# Patient Record
Sex: FEMALE | Race: WHITE | NOT HISPANIC OR LATINO | Employment: OTHER | URBAN - METROPOLITAN AREA
[De-identification: names, ages, dates, MRNs, and addresses within clinical notes are randomized per-mention and may not be internally consistent; named-entity substitution may affect disease eponyms.]

---

## 2017-01-10 ENCOUNTER — GENERIC CONVERSION - ENCOUNTER (OUTPATIENT)
Dept: OTHER | Facility: OTHER | Age: 72
End: 2017-01-10

## 2017-01-17 ENCOUNTER — GENERIC CONVERSION - ENCOUNTER (OUTPATIENT)
Dept: OTHER | Facility: OTHER | Age: 72
End: 2017-01-17

## 2017-02-14 ENCOUNTER — GENERIC CONVERSION - ENCOUNTER (OUTPATIENT)
Dept: OTHER | Facility: OTHER | Age: 72
End: 2017-02-14

## 2017-02-28 ENCOUNTER — GENERIC CONVERSION - ENCOUNTER (OUTPATIENT)
Dept: OTHER | Facility: OTHER | Age: 72
End: 2017-02-28

## 2017-03-02 ENCOUNTER — APPOINTMENT (EMERGENCY)
Dept: RADIOLOGY | Facility: HOSPITAL | Age: 72
End: 2017-03-02
Payer: COMMERCIAL

## 2017-03-02 ENCOUNTER — ALLSCRIPTS OFFICE VISIT (OUTPATIENT)
Dept: OTHER | Facility: OTHER | Age: 72
End: 2017-03-02

## 2017-03-02 ENCOUNTER — HOSPITAL ENCOUNTER (EMERGENCY)
Facility: HOSPITAL | Age: 72
Discharge: HOME | End: 2017-03-02
Attending: EMERGENCY MEDICINE | Admitting: EMERGENCY MEDICINE
Payer: COMMERCIAL

## 2017-03-02 VITALS
HEART RATE: 68 BPM | DIASTOLIC BLOOD PRESSURE: 65 MMHG | OXYGEN SATURATION: 98 % | SYSTOLIC BLOOD PRESSURE: 140 MMHG | WEIGHT: 206.35 LBS | RESPIRATION RATE: 17 BRPM | TEMPERATURE: 97.8 F

## 2017-03-02 DIAGNOSIS — F41.1 ANXIETY STATE: ICD-10-CM

## 2017-03-02 DIAGNOSIS — R07.9 CHEST PAIN: Primary | ICD-10-CM

## 2017-03-02 LAB
ANION GAP SERPL CALCULATED.3IONS-SCNC: 9 MMOL/L (ref 4–13)
APTT PPP: 26 SECONDS (ref 24–36)
ATRIAL RATE: 75 BPM
BASOPHILS # BLD AUTO: 0.01 THOUSANDS/ΜL (ref 0–0.1)
BASOPHILS NFR BLD AUTO: 0 % (ref 0–1)
BUN SERPL-MCNC: 13 MG/DL (ref 5–25)
CALCIUM SERPL-MCNC: 9.8 MG/DL (ref 8.3–10.1)
CHLORIDE SERPL-SCNC: 102 MMOL/L (ref 100–108)
CO2 SERPL-SCNC: 28 MMOL/L (ref 21–32)
CREAT SERPL-MCNC: 1.18 MG/DL (ref 0.6–1.3)
EOSINOPHIL # BLD AUTO: 0.05 THOUSAND/ΜL (ref 0–0.61)
EOSINOPHIL NFR BLD AUTO: 1 % (ref 0–6)
ERYTHROCYTE [DISTWIDTH] IN BLOOD BY AUTOMATED COUNT: 15.5 % (ref 11.6–15.1)
GFR SERPL CREATININE-BSD FRML MDRD: 45.3 ML/MIN/1.73SQ M
GLUCOSE SERPL-MCNC: 126 MG/DL (ref 65–140)
HCT VFR BLD AUTO: 36.8 % (ref 34.8–46.1)
HGB BLD-MCNC: 11.7 G/DL (ref 11.5–15.4)
INR PPP: 1.09 (ref 0.86–1.16)
LYMPHOCYTES # BLD AUTO: 2.78 THOUSANDS/ΜL (ref 0.6–4.47)
LYMPHOCYTES NFR BLD AUTO: 30 % (ref 14–44)
MCH RBC QN AUTO: 26.8 PG (ref 26.8–34.3)
MCHC RBC AUTO-ENTMCNC: 31.8 G/DL (ref 31.4–37.4)
MCV RBC AUTO: 84 FL (ref 82–98)
MONOCYTES # BLD AUTO: 0.65 THOUSAND/ΜL (ref 0.17–1.22)
MONOCYTES NFR BLD AUTO: 7 % (ref 4–12)
NEUTROPHILS # BLD AUTO: 5.87 THOUSANDS/ΜL (ref 1.85–7.62)
NEUTS SEG NFR BLD AUTO: 62 % (ref 43–75)
P AXIS: 73 DEGREES
PLATELET # BLD AUTO: 269 THOUSANDS/UL (ref 149–390)
PMV BLD AUTO: 11 FL (ref 8.9–12.7)
POTASSIUM SERPL-SCNC: 4.5 MMOL/L (ref 3.5–5.3)
PR INTERVAL: 146 MS
PROTHROMBIN TIME: 13.9 SECONDS (ref 12–14.3)
QRS AXIS: -29 DEGREES
QRSD INTERVAL: 86 MS
QT INTERVAL: 378 MS
QTC INTERVAL: 422 MS
RBC # BLD AUTO: 4.36 MILLION/UL (ref 3.81–5.12)
SODIUM SERPL-SCNC: 139 MMOL/L (ref 136–145)
T WAVE AXIS: 3 DEGREES
TROPONIN I SERPL-MCNC: <0.02 NG/ML
VENTRICULAR RATE: 75 BPM
WBC # BLD AUTO: 9.36 THOUSAND/UL (ref 4.31–10.16)

## 2017-03-02 PROCEDURE — 85025 COMPLETE CBC W/AUTO DIFF WBC: CPT | Performed by: EMERGENCY MEDICINE

## 2017-03-02 PROCEDURE — 71020 HB CHEST X-RAY 2VW FRONTAL&LATL: CPT

## 2017-03-02 PROCEDURE — 96374 THER/PROPH/DIAG INJ IV PUSH: CPT

## 2017-03-02 PROCEDURE — 85610 PROTHROMBIN TIME: CPT | Performed by: EMERGENCY MEDICINE

## 2017-03-02 PROCEDURE — 84484 ASSAY OF TROPONIN QUANT: CPT | Performed by: EMERGENCY MEDICINE

## 2017-03-02 PROCEDURE — 93005 ELECTROCARDIOGRAM TRACING: CPT | Performed by: EMERGENCY MEDICINE

## 2017-03-02 PROCEDURE — 99285 EMERGENCY DEPT VISIT HI MDM: CPT

## 2017-03-02 PROCEDURE — 36415 COLL VENOUS BLD VENIPUNCTURE: CPT | Performed by: EMERGENCY MEDICINE

## 2017-03-02 PROCEDURE — 80048 BASIC METABOLIC PNL TOTAL CA: CPT | Performed by: EMERGENCY MEDICINE

## 2017-03-02 PROCEDURE — 85730 THROMBOPLASTIN TIME PARTIAL: CPT | Performed by: EMERGENCY MEDICINE

## 2017-03-02 RX ORDER — INSULIN GLARGINE 100 [IU]/ML
45 INJECTION, SOLUTION SUBCUTANEOUS 2 TIMES DAILY
Status: ON HOLD | COMMUNITY
End: 2017-06-22

## 2017-03-02 RX ORDER — DULOXETIN HYDROCHLORIDE 30 MG/1
30 CAPSULE, DELAYED RELEASE ORAL
Status: ON HOLD | COMMUNITY
End: 2017-04-19 | Stop reason: CLARIF

## 2017-03-02 RX ORDER — LORAZEPAM 2 MG/ML
1 INJECTION INTRAMUSCULAR ONCE
Status: COMPLETED | OUTPATIENT
Start: 2017-03-02 | End: 2017-03-02

## 2017-03-02 RX ORDER — ALPRAZOLAM 0.5 MG/1
0.5 TABLET ORAL 3 TIMES DAILY PRN
COMMUNITY
End: 2018-04-03 | Stop reason: SDUPTHER

## 2017-03-02 RX ORDER — ATENOLOL 25 MG/1
12.5 TABLET ORAL
COMMUNITY
End: 2017-06-07

## 2017-03-02 RX ORDER — ATORVASTATIN CALCIUM 40 MG/1
40 TABLET, FILM COATED ORAL
COMMUNITY
End: 2018-12-13 | Stop reason: ALTCHOICE

## 2017-03-02 RX ORDER — AMLODIPINE BESYLATE 2.5 MG/1
2.5 TABLET ORAL 2 TIMES DAILY
COMMUNITY
End: 2018-09-13 | Stop reason: ALTCHOICE

## 2017-03-02 RX ORDER — ENALAPRIL MALEATE 5 MG/1
5 TABLET ORAL 2 TIMES DAILY
COMMUNITY
End: 2017-06-07

## 2017-03-02 RX ORDER — DULOXETIN HYDROCHLORIDE 60 MG/1
60 CAPSULE, DELAYED RELEASE ORAL EVERY MORNING
COMMUNITY
End: 2019-09-12 | Stop reason: SDUPTHER

## 2017-03-02 RX ORDER — ENALAPRIL MALEATE 10 MG/1
10 TABLET ORAL
Status: ON HOLD | COMMUNITY
End: 2017-04-19 | Stop reason: CLARIF

## 2017-03-02 RX ADMIN — LORAZEPAM 1 MG: 2 INJECTION INTRAMUSCULAR; INTRAVENOUS at 12:44

## 2017-03-03 ENCOUNTER — GENERIC CONVERSION - ENCOUNTER (OUTPATIENT)
Dept: OTHER | Facility: OTHER | Age: 72
End: 2017-03-03

## 2017-03-09 ENCOUNTER — LAB CONVERSION - ENCOUNTER (OUTPATIENT)
Dept: OTHER | Facility: OTHER | Age: 72
End: 2017-03-09

## 2017-03-09 LAB
CREATININE, RANDOM URINE (HISTORICAL): 178 MG/DL (ref 20–320)
MAGNESIUM, UR (HISTORICAL): 5.1 MG/DL
MICROALBUMIN/CREATININE RATIO (HISTORICAL): 29 MCG/MG CREAT

## 2017-03-13 ENCOUNTER — ALLSCRIPTS OFFICE VISIT (OUTPATIENT)
Dept: OTHER | Facility: OTHER | Age: 72
End: 2017-03-13

## 2017-03-15 ENCOUNTER — GENERIC CONVERSION - ENCOUNTER (OUTPATIENT)
Dept: OTHER | Facility: OTHER | Age: 72
End: 2017-03-15

## 2017-04-04 ENCOUNTER — GENERIC CONVERSION - ENCOUNTER (OUTPATIENT)
Dept: OTHER | Facility: OTHER | Age: 72
End: 2017-04-04

## 2017-04-12 ENCOUNTER — GENERIC CONVERSION - ENCOUNTER (OUTPATIENT)
Dept: OTHER | Facility: OTHER | Age: 72
End: 2017-04-12

## 2017-04-18 ENCOUNTER — APPOINTMENT (EMERGENCY)
Dept: CT IMAGING | Facility: HOSPITAL | Age: 72
End: 2017-04-18
Payer: COMMERCIAL

## 2017-04-18 ENCOUNTER — GENERIC CONVERSION - ENCOUNTER (OUTPATIENT)
Dept: OTHER | Facility: OTHER | Age: 72
End: 2017-04-18

## 2017-04-18 ENCOUNTER — HOSPITAL ENCOUNTER (EMERGENCY)
Facility: HOSPITAL | Age: 72
Discharge: HOME | End: 2017-04-18
Attending: EMERGENCY MEDICINE | Admitting: EMERGENCY MEDICINE
Payer: COMMERCIAL

## 2017-04-18 ENCOUNTER — APPOINTMENT (EMERGENCY)
Dept: RADIOLOGY | Facility: HOSPITAL | Age: 72
End: 2017-04-18
Payer: COMMERCIAL

## 2017-04-18 VITALS
WEIGHT: 211.64 LBS | SYSTOLIC BLOOD PRESSURE: 150 MMHG | TEMPERATURE: 99.2 F | RESPIRATION RATE: 18 BRPM | OXYGEN SATURATION: 100 % | DIASTOLIC BLOOD PRESSURE: 67 MMHG | HEART RATE: 70 BPM

## 2017-04-18 DIAGNOSIS — R10.9 ABDOMINAL PAIN: Primary | ICD-10-CM

## 2017-04-18 LAB
ALBUMIN SERPL BCP-MCNC: 3.4 G/DL (ref 3.5–5)
ALP SERPL-CCNC: 119 U/L (ref 46–116)
ALT SERPL W P-5'-P-CCNC: 33 U/L (ref 12–78)
ANION GAP SERPL CALCULATED.3IONS-SCNC: 10 MMOL/L (ref 4–13)
AST SERPL W P-5'-P-CCNC: 26 U/L (ref 5–45)
ATRIAL RATE: 74 BPM
BASOPHILS # BLD AUTO: 0.02 THOUSANDS/ΜL (ref 0–0.1)
BASOPHILS NFR BLD AUTO: 0 % (ref 0–1)
BILIRUB SERPL-MCNC: 0.6 MG/DL (ref 0.2–1)
BUN SERPL-MCNC: 11 MG/DL (ref 5–25)
CALCIUM SERPL-MCNC: 9.3 MG/DL (ref 8.3–10.1)
CHLORIDE SERPL-SCNC: 97 MMOL/L (ref 100–108)
CO2 SERPL-SCNC: 28 MMOL/L (ref 21–32)
CREAT SERPL-MCNC: 1.18 MG/DL (ref 0.6–1.3)
EOSINOPHIL # BLD AUTO: 0.06 THOUSAND/ΜL (ref 0–0.61)
EOSINOPHIL NFR BLD AUTO: 1 % (ref 0–6)
ERYTHROCYTE [DISTWIDTH] IN BLOOD BY AUTOMATED COUNT: 15 % (ref 11.6–15.1)
FLUAV AG SPEC QL IA: NEGATIVE
FLUAV AG SPEC QL: NORMAL
FLUBV AG SPEC QL IA: NEGATIVE
FLUBV AG SPEC QL: NORMAL
GFR SERPL CREATININE-BSD FRML MDRD: 45.3 ML/MIN/1.73SQ M
GLUCOSE SERPL-MCNC: 218 MG/DL (ref 65–140)
HCT VFR BLD AUTO: 34.4 % (ref 34.8–46.1)
HGB BLD-MCNC: 11 G/DL (ref 11.5–15.4)
LACTATE SERPL-SCNC: 1.4 MMOL/L (ref 0.5–2)
LIPASE SERPL-CCNC: 118 U/L (ref 73–393)
LYMPHOCYTES # BLD AUTO: 1.81 THOUSANDS/ΜL (ref 0.6–4.47)
LYMPHOCYTES NFR BLD AUTO: 17 % (ref 14–44)
MAGNESIUM SERPL-MCNC: 1.3 MG/DL (ref 1.6–2.6)
MCH RBC QN AUTO: 27.1 PG (ref 26.8–34.3)
MCHC RBC AUTO-ENTMCNC: 32 G/DL (ref 31.4–37.4)
MCV RBC AUTO: 85 FL (ref 82–98)
MONOCYTES # BLD AUTO: 1.1 THOUSAND/ΜL (ref 0.17–1.22)
MONOCYTES NFR BLD AUTO: 10 % (ref 4–12)
NEUTROPHILS # BLD AUTO: 7.64 THOUSANDS/ΜL (ref 1.85–7.62)
NEUTS SEG NFR BLD AUTO: 72 % (ref 43–75)
NT-PROBNP SERPL-MCNC: 72 PG/ML
P AXIS: 47 DEGREES
PLATELET # BLD AUTO: 249 THOUSANDS/UL (ref 149–390)
PMV BLD AUTO: 11 FL (ref 8.9–12.7)
POTASSIUM SERPL-SCNC: 4.4 MMOL/L (ref 3.5–5.3)
PR INTERVAL: 150 MS
PROT SERPL-MCNC: 8 G/DL (ref 6.4–8.2)
QRS AXIS: -33 DEGREES
QRSD INTERVAL: 88 MS
QT INTERVAL: 364 MS
QTC INTERVAL: 404 MS
RBC # BLD AUTO: 4.06 MILLION/UL (ref 3.81–5.12)
RSV B RNA SPEC QL NAA+PROBE: NORMAL
SODIUM SERPL-SCNC: 135 MMOL/L (ref 136–145)
T WAVE AXIS: 72 DEGREES
TROPONIN I SERPL-MCNC: <0.02 NG/ML
VENTRICULAR RATE: 74 BPM
WBC # BLD AUTO: 10.63 THOUSAND/UL (ref 4.31–10.16)

## 2017-04-18 PROCEDURE — 83605 ASSAY OF LACTIC ACID: CPT | Performed by: EMERGENCY MEDICINE

## 2017-04-18 PROCEDURE — 83690 ASSAY OF LIPASE: CPT | Performed by: EMERGENCY MEDICINE

## 2017-04-18 PROCEDURE — 84484 ASSAY OF TROPONIN QUANT: CPT | Performed by: EMERGENCY MEDICINE

## 2017-04-18 PROCEDURE — 36415 COLL VENOUS BLD VENIPUNCTURE: CPT | Performed by: EMERGENCY MEDICINE

## 2017-04-18 PROCEDURE — 96360 HYDRATION IV INFUSION INIT: CPT

## 2017-04-18 PROCEDURE — 85025 COMPLETE CBC W/AUTO DIFF WBC: CPT | Performed by: EMERGENCY MEDICINE

## 2017-04-18 PROCEDURE — 99285 EMERGENCY DEPT VISIT HI MDM: CPT

## 2017-04-18 PROCEDURE — 93005 ELECTROCARDIOGRAM TRACING: CPT

## 2017-04-18 PROCEDURE — 93005 ELECTROCARDIOGRAM TRACING: CPT | Performed by: EMERGENCY MEDICINE

## 2017-04-18 PROCEDURE — 83735 ASSAY OF MAGNESIUM: CPT | Performed by: EMERGENCY MEDICINE

## 2017-04-18 PROCEDURE — 74177 CT ABD & PELVIS W/CONTRAST: CPT

## 2017-04-18 PROCEDURE — 83880 ASSAY OF NATRIURETIC PEPTIDE: CPT | Performed by: EMERGENCY MEDICINE

## 2017-04-18 PROCEDURE — 87798 DETECT AGENT NOS DNA AMP: CPT | Performed by: EMERGENCY MEDICINE

## 2017-04-18 PROCEDURE — 87400 INFLUENZA A/B EACH AG IA: CPT | Performed by: EMERGENCY MEDICINE

## 2017-04-18 PROCEDURE — 71010 HB CHEST X-RAY 1 VIEW FRONTAL (PORTABLE): CPT

## 2017-04-18 PROCEDURE — 80053 COMPREHEN METABOLIC PANEL: CPT | Performed by: EMERGENCY MEDICINE

## 2017-04-18 RX ORDER — FAMOTIDINE 20 MG/1
20 TABLET, FILM COATED ORAL 2 TIMES DAILY
Qty: 60 TABLET | Refills: 0 | Status: SHIPPED | OUTPATIENT
Start: 2017-04-18 | End: 2018-09-07 | Stop reason: ALTCHOICE

## 2017-04-18 RX ADMIN — SODIUM CHLORIDE 1000 ML: 0.9 INJECTION, SOLUTION INTRAVENOUS at 14:00

## 2017-04-18 RX ADMIN — IOHEXOL 80 ML: 350 INJECTION, SOLUTION INTRAVENOUS at 13:38

## 2017-04-19 ENCOUNTER — HOSPITAL ENCOUNTER (OUTPATIENT)
Facility: HOSPITAL | Age: 72
Setting detail: OBSERVATION
Discharge: HOME | End: 2017-04-20
Attending: EMERGENCY MEDICINE | Admitting: FAMILY MEDICINE
Payer: COMMERCIAL

## 2017-04-19 ENCOUNTER — GENERIC CONVERSION - ENCOUNTER (OUTPATIENT)
Dept: OTHER | Facility: OTHER | Age: 72
End: 2017-04-19

## 2017-04-19 ENCOUNTER — APPOINTMENT (OUTPATIENT)
Dept: ULTRASOUND IMAGING | Facility: HOSPITAL | Age: 72
End: 2017-04-19
Payer: COMMERCIAL

## 2017-04-19 ENCOUNTER — APPOINTMENT (OUTPATIENT)
Dept: NUCLEAR MEDICINE | Facility: HOSPITAL | Age: 72
End: 2017-04-19
Payer: COMMERCIAL

## 2017-04-19 DIAGNOSIS — R09.1 PLEURISY: ICD-10-CM

## 2017-04-19 DIAGNOSIS — R06.00 DOE (DYSPNEA ON EXERTION): Primary | ICD-10-CM

## 2017-04-19 PROBLEM — M79.7 FIBROMYALGIA: Chronic | Status: ACTIVE | Noted: 2017-04-19

## 2017-04-19 PROBLEM — E78.5 HYPERLIPIDEMIA: Chronic | Status: ACTIVE | Noted: 2017-04-19

## 2017-04-19 PROBLEM — E03.9 HYPOTHYROIDISM: Chronic | Status: ACTIVE | Noted: 2017-04-19

## 2017-04-19 PROBLEM — M48.00 SPINAL STENOSIS: Chronic | Status: ACTIVE | Noted: 2017-04-19

## 2017-04-19 PROBLEM — Z86.718 HISTORY OF DVT (DEEP VEIN THROMBOSIS): Chronic | Status: ACTIVE | Noted: 2017-04-19

## 2017-04-19 PROBLEM — E11.9 TYPE 2 DIABETES MELLITUS (HCC): Chronic | Status: ACTIVE | Noted: 2017-04-19

## 2017-04-19 PROBLEM — I49.3 PVC'S (PREMATURE VENTRICULAR CONTRACTIONS): Chronic | Status: ACTIVE | Noted: 2017-04-19

## 2017-04-19 PROBLEM — N18.9 CHRONIC RENAL INSUFFICIENCY: Chronic | Status: ACTIVE | Noted: 2017-04-19

## 2017-04-19 PROBLEM — I25.10 CAD (CORONARY ARTERY DISEASE): Chronic | Status: ACTIVE | Noted: 2017-04-19

## 2017-04-19 PROBLEM — R07.1 CHEST PAIN ON BREATHING: Status: ACTIVE | Noted: 2017-04-19

## 2017-04-19 PROBLEM — I10 ESSENTIAL HYPERTENSION: Chronic | Status: ACTIVE | Noted: 2017-04-19

## 2017-04-19 PROBLEM — F41.9 ANXIETY: Chronic | Status: ACTIVE | Noted: 2017-04-19

## 2017-04-19 PROBLEM — F32.A DEPRESSION: Chronic | Status: ACTIVE | Noted: 2017-04-19

## 2017-04-19 LAB
ALBUMIN SERPL BCP-MCNC: 3.3 G/DL (ref 3.5–5)
ALP SERPL-CCNC: 115 U/L (ref 46–116)
ALT SERPL W P-5'-P-CCNC: 29 U/L (ref 12–78)
ANION GAP SERPL CALCULATED.3IONS-SCNC: 12 MMOL/L (ref 4–13)
APTT PPP: 29 SECONDS (ref 24–36)
AST SERPL W P-5'-P-CCNC: 31 U/L (ref 5–45)
ATRIAL RATE: 78 BPM
BASOPHILS # BLD AUTO: 0.03 THOUSANDS/ΜL (ref 0–0.1)
BASOPHILS NFR BLD AUTO: 0 % (ref 0–1)
BILIRUB DIRECT SERPL-MCNC: 0.16 MG/DL (ref 0–0.2)
BILIRUB SERPL-MCNC: 0.7 MG/DL (ref 0.2–1)
BUN SERPL-MCNC: 12 MG/DL (ref 5–25)
CALCIUM SERPL-MCNC: 9 MG/DL (ref 8.3–10.1)
CHLORIDE SERPL-SCNC: 98 MMOL/L (ref 100–108)
CO2 SERPL-SCNC: 25 MMOL/L (ref 21–32)
CREAT SERPL-MCNC: 1.23 MG/DL (ref 0.6–1.3)
DEPRECATED D DIMER PPP: 2318 NG/ML (FEU) (ref 0–424)
EOSINOPHIL # BLD AUTO: 0.02 THOUSAND/ΜL (ref 0–0.61)
EOSINOPHIL NFR BLD AUTO: 0 % (ref 0–6)
ERYTHROCYTE [DISTWIDTH] IN BLOOD BY AUTOMATED COUNT: 15.2 % (ref 11.6–15.1)
GFR SERPL CREATININE-BSD FRML MDRD: 43.2 ML/MIN/1.73SQ M
GLUCOSE SERPL-MCNC: 170 MG/DL (ref 65–140)
GLUCOSE SERPL-MCNC: 172 MG/DL (ref 65–140)
GLUCOSE SERPL-MCNC: 207 MG/DL (ref 65–140)
GLUCOSE SERPL-MCNC: 234 MG/DL (ref 65–140)
HCT VFR BLD AUTO: 34.8 % (ref 34.8–46.1)
HGB BLD-MCNC: 11.1 G/DL (ref 11.5–15.4)
INR PPP: 1.11 (ref 0.86–1.16)
LYMPHOCYTES # BLD AUTO: 1.4 THOUSANDS/ΜL (ref 0.6–4.47)
LYMPHOCYTES NFR BLD AUTO: 13 % (ref 14–44)
MCH RBC QN AUTO: 27.1 PG (ref 26.8–34.3)
MCHC RBC AUTO-ENTMCNC: 31.9 G/DL (ref 31.4–37.4)
MCV RBC AUTO: 85 FL (ref 82–98)
MONOCYTES # BLD AUTO: 1.01 THOUSAND/ΜL (ref 0.17–1.22)
MONOCYTES NFR BLD AUTO: 9 % (ref 4–12)
NEUTROPHILS # BLD AUTO: 8.38 THOUSANDS/ΜL (ref 1.85–7.62)
NEUTS SEG NFR BLD AUTO: 78 % (ref 43–75)
NT-PROBNP SERPL-MCNC: 124 PG/ML
P AXIS: 44 DEGREES
PLATELET # BLD AUTO: 234 THOUSANDS/UL (ref 149–390)
PLATELET # BLD AUTO: 239 THOUSANDS/UL (ref 149–390)
PMV BLD AUTO: 10.7 FL (ref 8.9–12.7)
PMV BLD AUTO: 10.9 FL (ref 8.9–12.7)
POTASSIUM SERPL-SCNC: 4.7 MMOL/L (ref 3.5–5.3)
PR INTERVAL: 150 MS
PROT SERPL-MCNC: 8.1 G/DL (ref 6.4–8.2)
PROTHROMBIN TIME: 14.1 SECONDS (ref 12–14.3)
QRS AXIS: -22 DEGREES
QRSD INTERVAL: 90 MS
QT INTERVAL: 372 MS
QTC INTERVAL: 424 MS
RBC # BLD AUTO: 4.1 MILLION/UL (ref 3.81–5.12)
SODIUM SERPL-SCNC: 135 MMOL/L (ref 136–145)
T WAVE AXIS: 46 DEGREES
TROPONIN I SERPL-MCNC: <0.02 NG/ML
VENTRICULAR RATE: 78 BPM
WBC # BLD AUTO: 10.84 THOUSAND/UL (ref 4.31–10.16)

## 2017-04-19 PROCEDURE — A9270 NON-COVERED ITEM OR SERVICE: HCPCS | Performed by: INTERNAL MEDICINE

## 2017-04-19 PROCEDURE — 93005 ELECTROCARDIOGRAM TRACING: CPT | Performed by: EMERGENCY MEDICINE

## 2017-04-19 PROCEDURE — 84484 ASSAY OF TROPONIN QUANT: CPT | Performed by: EMERGENCY MEDICINE

## 2017-04-19 PROCEDURE — 96360 HYDRATION IV INFUSION INIT: CPT

## 2017-04-19 PROCEDURE — 85025 COMPLETE CBC W/AUTO DIFF WBC: CPT | Performed by: EMERGENCY MEDICINE

## 2017-04-19 PROCEDURE — 84484 ASSAY OF TROPONIN QUANT: CPT | Performed by: INTERNAL MEDICINE

## 2017-04-19 PROCEDURE — 85730 THROMBOPLASTIN TIME PARTIAL: CPT | Performed by: EMERGENCY MEDICINE

## 2017-04-19 PROCEDURE — 85610 PROTHROMBIN TIME: CPT | Performed by: EMERGENCY MEDICINE

## 2017-04-19 PROCEDURE — 83880 ASSAY OF NATRIURETIC PEPTIDE: CPT | Performed by: EMERGENCY MEDICINE

## 2017-04-19 PROCEDURE — 82948 REAGENT STRIP/BLOOD GLUCOSE: CPT

## 2017-04-19 PROCEDURE — 85379 FIBRIN DEGRADATION QUANT: CPT | Performed by: EMERGENCY MEDICINE

## 2017-04-19 PROCEDURE — 96361 HYDRATE IV INFUSION ADD-ON: CPT

## 2017-04-19 PROCEDURE — 93970 EXTREMITY STUDY: CPT

## 2017-04-19 PROCEDURE — 85049 AUTOMATED PLATELET COUNT: CPT | Performed by: INTERNAL MEDICINE

## 2017-04-19 PROCEDURE — A9540 TC99M MAA: HCPCS

## 2017-04-19 PROCEDURE — 78582 LUNG VENTILAT&PERFUS IMAGING: CPT

## 2017-04-19 PROCEDURE — 99285 EMERGENCY DEPT VISIT HI MDM: CPT

## 2017-04-19 PROCEDURE — 80076 HEPATIC FUNCTION PANEL: CPT | Performed by: EMERGENCY MEDICINE

## 2017-04-19 PROCEDURE — 80048 BASIC METABOLIC PNL TOTAL CA: CPT | Performed by: EMERGENCY MEDICINE

## 2017-04-19 PROCEDURE — A9558 XE133 XENON 10MCI: HCPCS

## 2017-04-19 PROCEDURE — 36415 COLL VENOUS BLD VENIPUNCTURE: CPT | Performed by: EMERGENCY MEDICINE

## 2017-04-19 RX ORDER — ATORVASTATIN CALCIUM 20 MG/1
40 TABLET, FILM COATED ORAL
Status: DISCONTINUED | OUTPATIENT
Start: 2017-04-19 | End: 2017-04-20 | Stop reason: HOSPADM

## 2017-04-19 RX ORDER — FAMOTIDINE 20 MG/1
20 TABLET, FILM COATED ORAL 2 TIMES DAILY
Status: DISCONTINUED | OUTPATIENT
Start: 2017-04-19 | End: 2017-04-20 | Stop reason: HOSPADM

## 2017-04-19 RX ORDER — HEPARIN SODIUM 5000 [USP'U]/ML
5000 INJECTION, SOLUTION INTRAVENOUS; SUBCUTANEOUS EVERY 8 HOURS SCHEDULED
Status: DISCONTINUED | OUTPATIENT
Start: 2017-04-19 | End: 2017-04-20 | Stop reason: HOSPADM

## 2017-04-19 RX ORDER — CHOLECALCIFEROL (VITAMIN D3) 125 MCG
100 CAPSULE ORAL 2 TIMES DAILY
Status: DISCONTINUED | OUTPATIENT
Start: 2017-04-19 | End: 2017-04-20 | Stop reason: HOSPADM

## 2017-04-19 RX ORDER — ONDANSETRON 2 MG/ML
4 INJECTION INTRAMUSCULAR; INTRAVENOUS EVERY 6 HOURS PRN
Status: DISCONTINUED | OUTPATIENT
Start: 2017-04-19 | End: 2017-04-20 | Stop reason: HOSPADM

## 2017-04-19 RX ORDER — ATENOLOL 25 MG/1
12.5 TABLET ORAL
Status: DISCONTINUED | OUTPATIENT
Start: 2017-04-19 | End: 2017-04-20 | Stop reason: HOSPADM

## 2017-04-19 RX ORDER — ACETAMINOPHEN 325 MG/1
650 TABLET ORAL EVERY 6 HOURS PRN
Status: DISCONTINUED | OUTPATIENT
Start: 2017-04-19 | End: 2017-04-20 | Stop reason: HOSPADM

## 2017-04-19 RX ORDER — DULOXETIN HYDROCHLORIDE 60 MG/1
60 CAPSULE, DELAYED RELEASE ORAL EVERY MORNING
Status: DISCONTINUED | OUTPATIENT
Start: 2017-04-20 | End: 2017-04-20 | Stop reason: HOSPADM

## 2017-04-19 RX ORDER — SUCRALFATE ORAL 1 G/10ML
1000 SUSPENSION ORAL EVERY 6 HOURS SCHEDULED
Status: DISCONTINUED | OUTPATIENT
Start: 2017-04-19 | End: 2017-04-20 | Stop reason: HOSPADM

## 2017-04-19 RX ORDER — B-COMPLEX WITH VITAMIN C
2 TABLET ORAL
Status: DISCONTINUED | OUTPATIENT
Start: 2017-04-20 | End: 2017-04-20 | Stop reason: HOSPADM

## 2017-04-19 RX ORDER — ASPIRIN 81 MG/1
81 TABLET, CHEWABLE ORAL DAILY
Status: DISCONTINUED | OUTPATIENT
Start: 2017-04-19 | End: 2017-04-20 | Stop reason: HOSPADM

## 2017-04-19 RX ORDER — LEVOTHYROXINE SODIUM 0.05 MG/1
50 TABLET ORAL DAILY
COMMUNITY

## 2017-04-19 RX ORDER — B-COMPLEX WITH VITAMIN C
2 TABLET ORAL
COMMUNITY
End: 2017-06-07

## 2017-04-19 RX ORDER — ENALAPRIL MALEATE 5 MG/1
5 TABLET ORAL 2 TIMES DAILY
Status: DISCONTINUED | OUTPATIENT
Start: 2017-04-19 | End: 2017-04-20 | Stop reason: HOSPADM

## 2017-04-19 RX ORDER — INSULIN GLARGINE 100 [IU]/ML
45 INJECTION, SOLUTION SUBCUTANEOUS EVERY 12 HOURS SCHEDULED
Status: DISCONTINUED | OUTPATIENT
Start: 2017-04-19 | End: 2017-04-20 | Stop reason: HOSPADM

## 2017-04-19 RX ORDER — AMLODIPINE BESYLATE 2.5 MG/1
2.5 TABLET ORAL 2 TIMES DAILY
Status: DISCONTINUED | OUTPATIENT
Start: 2017-04-19 | End: 2017-04-20 | Stop reason: HOSPADM

## 2017-04-19 RX ORDER — ALPRAZOLAM 0.5 MG/1
0.5 TABLET ORAL 3 TIMES DAILY PRN
Status: DISCONTINUED | OUTPATIENT
Start: 2017-04-19 | End: 2017-04-20 | Stop reason: HOSPADM

## 2017-04-19 RX ORDER — LEVOTHYROXINE SODIUM 0.05 MG/1
50 TABLET ORAL
Status: DISCONTINUED | OUTPATIENT
Start: 2017-04-20 | End: 2017-04-20 | Stop reason: HOSPADM

## 2017-04-19 RX ADMIN — SODIUM CHLORIDE 500 ML: 0.9 INJECTION, SOLUTION INTRAVENOUS at 11:48

## 2017-04-19 RX ADMIN — AMLODIPINE BESYLATE 2.5 MG: 2.5 TABLET ORAL at 17:17

## 2017-04-19 RX ADMIN — HEPARIN SODIUM 5000 UNITS: 5000 INJECTION, SOLUTION INTRAVENOUS; SUBCUTANEOUS at 22:25

## 2017-04-19 RX ADMIN — INSULIN LISPRO 1 UNITS: 100 INJECTION, SOLUTION INTRAVENOUS; SUBCUTANEOUS at 17:18

## 2017-04-19 RX ADMIN — ENALAPRIL MALEATE 5 MG: 5 TABLET ORAL at 17:17

## 2017-04-19 RX ADMIN — SUCRALFATE 1000 MG: 1 SUSPENSION ORAL at 17:17

## 2017-04-19 RX ADMIN — FAMOTIDINE 20 MG: 20 TABLET ORAL at 17:17

## 2017-04-19 RX ADMIN — HEPARIN SODIUM 5000 UNITS: 5000 INJECTION, SOLUTION INTRAVENOUS; SUBCUTANEOUS at 16:22

## 2017-04-19 RX ADMIN — INSULIN GLARGINE 45 UNITS: 100 INJECTION, SOLUTION SUBCUTANEOUS at 22:26

## 2017-04-19 RX ADMIN — Medication 100 MG: at 17:17

## 2017-04-19 RX ADMIN — ASPIRIN 81 MG: 81 TABLET, CHEWABLE ORAL at 16:20

## 2017-04-19 RX ADMIN — ATORVASTATIN CALCIUM 40 MG: 20 TABLET, FILM COATED ORAL at 22:27

## 2017-04-19 RX ADMIN — ATENOLOL 12.5 MG: 25 TABLET ORAL at 22:26

## 2017-04-19 RX ADMIN — INSULIN LISPRO 1 UNITS: 100 INJECTION, SOLUTION INTRAVENOUS; SUBCUTANEOUS at 22:20

## 2017-04-20 VITALS
HEIGHT: 62 IN | TEMPERATURE: 98 F | HEART RATE: 64 BPM | BODY MASS INDEX: 39.03 KG/M2 | DIASTOLIC BLOOD PRESSURE: 63 MMHG | OXYGEN SATURATION: 95 % | SYSTOLIC BLOOD PRESSURE: 145 MMHG | WEIGHT: 212.08 LBS | RESPIRATION RATE: 18 BRPM

## 2017-04-20 LAB
ALBUMIN SERPL BCP-MCNC: 2.7 G/DL (ref 3.5–5)
ALP SERPL-CCNC: 92 U/L (ref 46–116)
ALT SERPL W P-5'-P-CCNC: 25 U/L (ref 12–78)
ANION GAP SERPL CALCULATED.3IONS-SCNC: 10 MMOL/L (ref 4–13)
AST SERPL W P-5'-P-CCNC: 30 U/L (ref 5–45)
ATRIAL RATE: 64 BPM
BILIRUB SERPL-MCNC: 0.6 MG/DL (ref 0.2–1)
BUN SERPL-MCNC: 10 MG/DL (ref 5–25)
CALCIUM SERPL-MCNC: 8.7 MG/DL (ref 8.3–10.1)
CHLORIDE SERPL-SCNC: 102 MMOL/L (ref 100–108)
CO2 SERPL-SCNC: 27 MMOL/L (ref 21–32)
CREAT SERPL-MCNC: 1.17 MG/DL (ref 0.6–1.3)
ERYTHROCYTE [DISTWIDTH] IN BLOOD BY AUTOMATED COUNT: 15.3 % (ref 11.6–15.1)
GFR SERPL CREATININE-BSD FRML MDRD: 45.7 ML/MIN/1.73SQ M
GLUCOSE P FAST SERPL-MCNC: 118 MG/DL (ref 65–99)
GLUCOSE SERPL-MCNC: 118 MG/DL (ref 65–140)
GLUCOSE SERPL-MCNC: 140 MG/DL (ref 65–140)
GLUCOSE SERPL-MCNC: 190 MG/DL (ref 65–140)
HCT VFR BLD AUTO: 31.5 % (ref 34.8–46.1)
HGB BLD-MCNC: 9.8 G/DL (ref 11.5–15.4)
MAGNESIUM SERPL-MCNC: 1.5 MG/DL (ref 1.6–2.6)
MCH RBC QN AUTO: 26.5 PG (ref 26.8–34.3)
MCHC RBC AUTO-ENTMCNC: 31.1 G/DL (ref 31.4–37.4)
MCV RBC AUTO: 85 FL (ref 82–98)
P AXIS: 41 DEGREES
PLATELET # BLD AUTO: 231 THOUSANDS/UL (ref 149–390)
PMV BLD AUTO: 10.8 FL (ref 8.9–12.7)
POTASSIUM SERPL-SCNC: 4 MMOL/L (ref 3.5–5.3)
PR INTERVAL: 168 MS
PROT SERPL-MCNC: 7 G/DL (ref 6.4–8.2)
QRS AXIS: -26 DEGREES
QRSD INTERVAL: 88 MS
QT INTERVAL: 402 MS
QTC INTERVAL: 414 MS
RBC # BLD AUTO: 3.7 MILLION/UL (ref 3.81–5.12)
SODIUM SERPL-SCNC: 139 MMOL/L (ref 136–145)
T WAVE AXIS: 30 DEGREES
VENTRICULAR RATE: 64 BPM
WBC # BLD AUTO: 8.52 THOUSAND/UL (ref 4.31–10.16)

## 2017-04-20 PROCEDURE — A9270 NON-COVERED ITEM OR SERVICE: HCPCS | Performed by: INTERNAL MEDICINE

## 2017-04-20 PROCEDURE — 80053 COMPREHEN METABOLIC PANEL: CPT | Performed by: INTERNAL MEDICINE

## 2017-04-20 PROCEDURE — 85027 COMPLETE CBC AUTOMATED: CPT | Performed by: INTERNAL MEDICINE

## 2017-04-20 PROCEDURE — 82948 REAGENT STRIP/BLOOD GLUCOSE: CPT

## 2017-04-20 PROCEDURE — 83735 ASSAY OF MAGNESIUM: CPT | Performed by: INTERNAL MEDICINE

## 2017-04-20 RX ORDER — TRAMADOL HYDROCHLORIDE 50 MG/1
50 TABLET ORAL EVERY 6 HOURS PRN
Qty: 30 TABLET | Refills: 0 | Status: SHIPPED | OUTPATIENT
Start: 2017-04-20 | End: 2017-04-30

## 2017-04-20 RX ORDER — LIDOCAINE 50 MG/G
1 PATCH TOPICAL DAILY
Status: DISCONTINUED | OUTPATIENT
Start: 2017-04-20 | End: 2017-04-20 | Stop reason: HOSPADM

## 2017-04-20 RX ORDER — MAGNESIUM SULFATE HEPTAHYDRATE 40 MG/ML
2 INJECTION, SOLUTION INTRAVENOUS ONCE
Status: DISCONTINUED | OUTPATIENT
Start: 2017-04-20 | End: 2017-04-20

## 2017-04-20 RX ORDER — SUCRALFATE ORAL 1 G/10ML
1000 SUSPENSION ORAL EVERY 6 HOURS SCHEDULED
Qty: 420 ML | Refills: 0 | Status: SHIPPED | OUTPATIENT
Start: 2017-04-20 | End: 2018-09-14 | Stop reason: ALTCHOICE

## 2017-04-20 RX ADMIN — OYSTER SHELL CALCIUM WITH VITAMIN D 2 TABLET: 500; 200 TABLET, FILM COATED ORAL at 08:44

## 2017-04-20 RX ADMIN — INSULIN GLARGINE 45 UNITS: 100 INJECTION, SOLUTION SUBCUTANEOUS at 08:43

## 2017-04-20 RX ADMIN — DULOXETINE HYDROCHLORIDE 60 MG: 60 CAPSULE, DELAYED RELEASE ORAL at 08:44

## 2017-04-20 RX ADMIN — SUCRALFATE 1000 MG: 1 SUSPENSION ORAL at 00:17

## 2017-04-20 RX ADMIN — LIDOCAINE 1 PATCH: 50 PATCH CUTANEOUS at 11:40

## 2017-04-20 RX ADMIN — SUCRALFATE 1000 MG: 1 SUSPENSION ORAL at 05:20

## 2017-04-20 RX ADMIN — Medication 400 MG: at 11:40

## 2017-04-20 RX ADMIN — Medication 100 MG: at 08:44

## 2017-04-20 RX ADMIN — ASPIRIN 81 MG: 81 TABLET, CHEWABLE ORAL at 08:45

## 2017-04-20 RX ADMIN — HEPARIN SODIUM 5000 UNITS: 5000 INJECTION, SOLUTION INTRAVENOUS; SUBCUTANEOUS at 05:20

## 2017-04-20 RX ADMIN — ENALAPRIL MALEATE 5 MG: 5 TABLET ORAL at 08:44

## 2017-04-20 RX ADMIN — AMLODIPINE BESYLATE 2.5 MG: 2.5 TABLET ORAL at 08:45

## 2017-04-20 RX ADMIN — LEVOTHYROXINE SODIUM 50 MCG: 50 TABLET ORAL at 05:20

## 2017-04-20 RX ADMIN — FAMOTIDINE 20 MG: 20 TABLET ORAL at 08:44

## 2017-04-25 ENCOUNTER — ALLSCRIPTS OFFICE VISIT (OUTPATIENT)
Dept: OTHER | Facility: OTHER | Age: 72
End: 2017-04-25

## 2017-04-25 DIAGNOSIS — Z00.00 ENCOUNTER FOR GENERAL ADULT MEDICAL EXAMINATION WITHOUT ABNORMAL FINDINGS: ICD-10-CM

## 2017-04-25 DIAGNOSIS — D64.9 ANEMIA: ICD-10-CM

## 2017-04-25 DIAGNOSIS — E11.9 TYPE 2 DIABETES MELLITUS WITHOUT COMPLICATIONS (HCC): ICD-10-CM

## 2017-04-25 DIAGNOSIS — R07.82 INTERCOSTAL PAIN: ICD-10-CM

## 2017-04-25 DIAGNOSIS — N85.01 BENIGN ENDOMETRIAL HYPERPLASIA: ICD-10-CM

## 2017-04-25 DIAGNOSIS — R93.89 ABNORMAL FINDINGS ON DIAGNOSTIC IMAGING OF OTHER SPECIFIED BODY STRUCTURES: ICD-10-CM

## 2017-04-25 DIAGNOSIS — I82.409 ACUTE EMBOLISM AND THROMBOSIS OF DEEP VEIN OF LOWER EXTREMITY (HCC): ICD-10-CM

## 2017-05-04 ENCOUNTER — ALLSCRIPTS OFFICE VISIT (OUTPATIENT)
Dept: OTHER | Facility: OTHER | Age: 72
End: 2017-05-04

## 2017-05-04 PROCEDURE — 88305 TISSUE EXAM BY PATHOLOGIST: CPT | Performed by: OBSTETRICS & GYNECOLOGY

## 2017-05-05 ENCOUNTER — LAB REQUISITION (OUTPATIENT)
Dept: LAB | Facility: HOSPITAL | Age: 72
End: 2017-05-05
Payer: COMMERCIAL

## 2017-05-05 DIAGNOSIS — R93.89 ABNORMAL FINDINGS ON DIAGNOSTIC IMAGING OF OTHER SPECIFIED BODY STRUCTURES: ICD-10-CM

## 2017-05-10 ENCOUNTER — HOSPITAL ENCOUNTER (OUTPATIENT)
Dept: MAMMOGRAPHY | Facility: HOSPITAL | Age: 72
Discharge: HOME | End: 2017-05-10
Attending: OBSTETRICS & GYNECOLOGY
Payer: COMMERCIAL

## 2017-05-10 DIAGNOSIS — Z00.00 ENCOUNTER FOR GENERAL ADULT MEDICAL EXAMINATION WITHOUT ABNORMAL FINDINGS: ICD-10-CM

## 2017-05-10 DIAGNOSIS — R93.89 ABNORMAL FINDINGS ON DIAGNOSTIC IMAGING OF OTHER SPECIFIED BODY STRUCTURES: ICD-10-CM

## 2017-05-10 PROCEDURE — G0202 SCR MAMMO BI INCL CAD: HCPCS

## 2017-05-11 ENCOUNTER — GENERIC CONVERSION - ENCOUNTER (OUTPATIENT)
Dept: OTHER | Facility: OTHER | Age: 72
End: 2017-05-11

## 2017-05-18 ENCOUNTER — ALLSCRIPTS OFFICE VISIT (OUTPATIENT)
Dept: OTHER | Facility: OTHER | Age: 72
End: 2017-05-18

## 2017-05-24 ENCOUNTER — TRANSCRIBE ORDERS (OUTPATIENT)
Dept: ADMINISTRATIVE | Facility: HOSPITAL | Age: 72
End: 2017-05-24

## 2017-05-24 ENCOUNTER — ALLSCRIPTS OFFICE VISIT (OUTPATIENT)
Dept: OTHER | Facility: OTHER | Age: 72
End: 2017-05-24

## 2017-05-24 DIAGNOSIS — R91.8 LUNG MASS: Primary | ICD-10-CM

## 2017-05-30 ENCOUNTER — GENERIC CONVERSION - ENCOUNTER (OUTPATIENT)
Dept: OTHER | Facility: OTHER | Age: 72
End: 2017-05-30

## 2017-05-30 PROCEDURE — 88305 TISSUE EXAM BY PATHOLOGIST: CPT | Performed by: COLON & RECTAL SURGERY

## 2017-05-31 ENCOUNTER — LAB REQUISITION (OUTPATIENT)
Dept: LAB | Facility: HOSPITAL | Age: 72
End: 2017-05-31
Payer: COMMERCIAL

## 2017-05-31 DIAGNOSIS — Z86.010 HISTORY OF COLONIC POLYPS: ICD-10-CM

## 2017-05-31 DIAGNOSIS — K57.30 DIVERTICULOSIS OF LARGE INTESTINE WITHOUT PERFORATION OR ABSCESS WITHOUT BLEEDING: ICD-10-CM

## 2017-06-06 ENCOUNTER — TRANSCRIBE ORDERS (OUTPATIENT)
Dept: RADIOLOGY | Facility: HOSPITAL | Age: 72
End: 2017-06-06

## 2017-06-06 ENCOUNTER — HOSPITAL ENCOUNTER (OUTPATIENT)
Dept: RADIOLOGY | Facility: HOSPITAL | Age: 72
Discharge: HOME | End: 2017-06-06
Attending: OBSTETRICS & GYNECOLOGY
Payer: COMMERCIAL

## 2017-06-06 ENCOUNTER — APPOINTMENT (OUTPATIENT)
Dept: LAB | Facility: HOSPITAL | Age: 72
End: 2017-06-06
Attending: OBSTETRICS & GYNECOLOGY
Payer: COMMERCIAL

## 2017-06-06 ENCOUNTER — TRANSCRIBE ORDERS (OUTPATIENT)
Dept: LAB | Facility: HOSPITAL | Age: 72
End: 2017-06-06

## 2017-06-06 DIAGNOSIS — N85.01 BENIGN ENDOMETRIAL HYPERPLASIA: ICD-10-CM

## 2017-06-06 DIAGNOSIS — R93.89 ABNORMAL FINDINGS ON DIAGNOSTIC IMAGING OF OTHER SPECIFIED BODY STRUCTURES: ICD-10-CM

## 2017-06-06 DIAGNOSIS — R93.89 ABNORMAL RADIOLOGICAL FINDINGS IN SKIN AND SUBCUTANEOUS TISSUE: ICD-10-CM

## 2017-06-06 DIAGNOSIS — N85.01 SIMPLE ENDOMETRIAL HYPERPLASIA WITHOUT ATYPIA: ICD-10-CM

## 2017-06-06 DIAGNOSIS — N85.01 SIMPLE ENDOMETRIAL HYPERPLASIA WITHOUT ATYPIA: Primary | ICD-10-CM

## 2017-06-06 LAB
ABO GROUP BLD: NORMAL
ALBUMIN SERPL BCP-MCNC: 3.5 G/DL (ref 3.5–5)
ALP SERPL-CCNC: 115 U/L (ref 46–116)
ALT SERPL W P-5'-P-CCNC: 26 U/L (ref 12–78)
ANION GAP SERPL CALCULATED.3IONS-SCNC: 8 MMOL/L (ref 4–13)
AST SERPL W P-5'-P-CCNC: 20 U/L (ref 5–45)
ATRIAL RATE: 63 BPM
ATRIAL RATE: 66 BPM
BASOPHILS # BLD AUTO: 0.01 THOUSANDS/ΜL (ref 0–0.1)
BASOPHILS NFR BLD AUTO: 0 % (ref 0–1)
BILIRUB SERPL-MCNC: 0.37 MG/DL (ref 0.2–1)
BLD GP AB SCN SERPL QL: NEGATIVE
BUN SERPL-MCNC: 22 MG/DL (ref 5–25)
CALCIUM SERPL-MCNC: 9.5 MG/DL (ref 8.3–10.1)
CHLORIDE SERPL-SCNC: 104 MMOL/L (ref 100–108)
CO2 SERPL-SCNC: 24 MMOL/L (ref 21–32)
CREAT SERPL-MCNC: 1.23 MG/DL (ref 0.6–1.3)
EOSINOPHIL # BLD AUTO: 0.09 THOUSAND/ΜL (ref 0–0.61)
EOSINOPHIL NFR BLD AUTO: 1 % (ref 0–6)
ERYTHROCYTE [DISTWIDTH] IN BLOOD BY AUTOMATED COUNT: 16 % (ref 11.6–15.1)
EST. AVERAGE GLUCOSE BLD GHB EST-MCNC: 203 MG/DL
GFR SERPL CREATININE-BSD FRML MDRD: 43.2 ML/MIN/1.73SQ M
GLUCOSE SERPL-MCNC: 245 MG/DL (ref 65–140)
HBA1C MFR BLD: 8.7 % (ref 4.2–6.3)
HCT VFR BLD AUTO: 33.6 % (ref 34.8–46.1)
HGB BLD-MCNC: 10.8 G/DL (ref 11.5–15.4)
LYMPHOCYTES # BLD AUTO: 2.26 THOUSANDS/ΜL (ref 0.6–4.47)
LYMPHOCYTES NFR BLD AUTO: 28 % (ref 14–44)
MCH RBC QN AUTO: 27.3 PG (ref 26.8–34.3)
MCHC RBC AUTO-ENTMCNC: 32.1 G/DL (ref 31.4–37.4)
MCV RBC AUTO: 85 FL (ref 82–98)
MONOCYTES # BLD AUTO: 0.68 THOUSAND/ΜL (ref 0.17–1.22)
MONOCYTES NFR BLD AUTO: 8 % (ref 4–12)
NEUTROPHILS # BLD AUTO: 5.17 THOUSANDS/ΜL (ref 1.85–7.62)
NEUTS SEG NFR BLD AUTO: 63 % (ref 43–75)
NRBC BLD AUTO-RTO: 0 /100 WBCS
P AXIS: 31 DEGREES
P AXIS: 40 DEGREES
PLATELET # BLD AUTO: 251 THOUSANDS/UL (ref 149–390)
PMV BLD AUTO: 11.6 FL (ref 8.9–12.7)
POTASSIUM SERPL-SCNC: 4.6 MMOL/L (ref 3.5–5.3)
PR INTERVAL: 172 MS
PR INTERVAL: 172 MS
PROT SERPL-MCNC: 7.9 G/DL (ref 6.4–8.2)
QRS AXIS: -15 DEGREES
QRS AXIS: -16 DEGREES
QRSD INTERVAL: 90 MS
QRSD INTERVAL: 92 MS
QT INTERVAL: 406 MS
QT INTERVAL: 414 MS
QTC INTERVAL: 415 MS
QTC INTERVAL: 434 MS
RBC # BLD AUTO: 3.96 MILLION/UL (ref 3.81–5.12)
RH BLD: POSITIVE
SODIUM SERPL-SCNC: 136 MMOL/L (ref 136–145)
SPECIMEN EXPIRATION DATE: NORMAL
T WAVE AXIS: 41 DEGREES
T WAVE AXIS: 52 DEGREES
VENTRICULAR RATE: 63 BPM
VENTRICULAR RATE: 66 BPM
WBC # BLD AUTO: 8.23 THOUSAND/UL (ref 4.31–10.16)

## 2017-06-06 PROCEDURE — 86900 BLOOD TYPING SEROLOGIC ABO: CPT

## 2017-06-06 PROCEDURE — 86901 BLOOD TYPING SEROLOGIC RH(D): CPT

## 2017-06-06 PROCEDURE — 85025 COMPLETE CBC W/AUTO DIFF WBC: CPT

## 2017-06-06 PROCEDURE — 71020 HB CHEST X-RAY 2VW FRONTAL&LATL: CPT

## 2017-06-06 PROCEDURE — 83036 HEMOGLOBIN GLYCOSYLATED A1C: CPT

## 2017-06-06 PROCEDURE — 93005 ELECTROCARDIOGRAM TRACING: CPT

## 2017-06-06 PROCEDURE — 80053 COMPREHEN METABOLIC PANEL: CPT

## 2017-06-06 PROCEDURE — 36415 COLL VENOUS BLD VENIPUNCTURE: CPT

## 2017-06-06 PROCEDURE — 86850 RBC ANTIBODY SCREEN: CPT

## 2017-06-07 RX ORDER — TIZANIDINE 2 MG/1
2 TABLET ORAL
COMMUNITY
End: 2020-06-05 | Stop reason: ALTCHOICE

## 2017-06-07 RX ORDER — ENALAPRIL MALEATE 20 MG/1
20 TABLET ORAL
COMMUNITY

## 2017-06-07 RX ORDER — ACEBUTOLOL HYDROCHLORIDE 200 MG/1
200 CAPSULE ORAL
COMMUNITY
End: 2018-04-24 | Stop reason: ALTCHOICE

## 2017-06-08 ENCOUNTER — HOSPITAL ENCOUNTER (OUTPATIENT)
Dept: CT IMAGING | Facility: HOSPITAL | Age: 72
Discharge: HOME | End: 2017-06-08
Attending: INTERNAL MEDICINE
Payer: COMMERCIAL

## 2017-06-08 DIAGNOSIS — R91.1 SOLITARY PULMONARY NODULE: ICD-10-CM

## 2017-06-08 PROCEDURE — 71250 CT THORAX DX C-: CPT

## 2017-06-09 ENCOUNTER — ANESTHESIA EVENT (OUTPATIENT)
Dept: PERIOP | Facility: HOSPITAL | Age: 72
End: 2017-06-09
Payer: COMMERCIAL

## 2017-06-12 ENCOUNTER — GENERIC CONVERSION - ENCOUNTER (OUTPATIENT)
Dept: OTHER | Facility: OTHER | Age: 72
End: 2017-06-12

## 2017-06-16 ENCOUNTER — GENERIC CONVERSION - ENCOUNTER (OUTPATIENT)
Dept: OTHER | Facility: OTHER | Age: 72
End: 2017-06-16

## 2017-06-20 ENCOUNTER — HOSPITAL ENCOUNTER (OUTPATIENT)
Facility: HOSPITAL | Age: 72
Setting detail: OBSERVATION
Discharge: HOME | End: 2017-06-22
Attending: OBSTETRICS & GYNECOLOGY | Admitting: OBSTETRICS & GYNECOLOGY
Payer: COMMERCIAL

## 2017-06-20 ENCOUNTER — ANESTHESIA (OUTPATIENT)
Dept: PERIOP | Facility: HOSPITAL | Age: 72
End: 2017-06-20
Payer: COMMERCIAL

## 2017-06-20 DIAGNOSIS — Z79.4 TYPE 2 DIABETES MELLITUS WITH COMPLICATION, WITH LONG-TERM CURRENT USE OF INSULIN (HCC): Primary | Chronic | ICD-10-CM

## 2017-06-20 DIAGNOSIS — N85.01 BENIGN ENDOMETRIAL HYPERPLASIA: ICD-10-CM

## 2017-06-20 DIAGNOSIS — E11.8 TYPE 2 DIABETES MELLITUS WITH COMPLICATION, WITH LONG-TERM CURRENT USE OF INSULIN (HCC): Primary | Chronic | ICD-10-CM

## 2017-06-20 DIAGNOSIS — R93.89 THICKENED ENDOMETRIUM: ICD-10-CM

## 2017-06-20 LAB
GLUCOSE SERPL-MCNC: 150 MG/DL (ref 65–140)
GLUCOSE SERPL-MCNC: 234 MG/DL (ref 65–140)
GLUCOSE SERPL-MCNC: 263 MG/DL (ref 65–140)
GLUCOSE SERPL-MCNC: 266 MG/DL (ref 65–140)
GLUCOSE SERPL-MCNC: 298 MG/DL (ref 65–140)

## 2017-06-20 PROCEDURE — 82948 REAGENT STRIP/BLOOD GLUCOSE: CPT

## 2017-06-20 PROCEDURE — 88309 TISSUE EXAM BY PATHOLOGIST: CPT | Performed by: OBSTETRICS & GYNECOLOGY

## 2017-06-20 RX ORDER — MAGNESIUM HYDROXIDE 1200 MG/15ML
LIQUID ORAL AS NEEDED
Status: DISCONTINUED | OUTPATIENT
Start: 2017-06-20 | End: 2017-06-20 | Stop reason: HOSPADM

## 2017-06-20 RX ORDER — OXYCODONE HYDROCHLORIDE AND ACETAMINOPHEN 5; 325 MG/1; MG/1
2 TABLET ORAL EVERY 4 HOURS PRN
Status: DISCONTINUED | OUTPATIENT
Start: 2017-06-20 | End: 2017-06-22 | Stop reason: HOSPADM

## 2017-06-20 RX ORDER — DULOXETIN HYDROCHLORIDE 60 MG/1
60 CAPSULE, DELAYED RELEASE ORAL EVERY MORNING
Status: DISCONTINUED | OUTPATIENT
Start: 2017-06-21 | End: 2017-06-22 | Stop reason: HOSPADM

## 2017-06-20 RX ORDER — ASPIRIN 81 MG/1
81 TABLET, CHEWABLE ORAL DAILY
Status: DISCONTINUED | OUTPATIENT
Start: 2017-06-21 | End: 2017-06-22 | Stop reason: HOSPADM

## 2017-06-20 RX ORDER — GLYCOPYRROLATE 0.2 MG/ML
INJECTION INTRAMUSCULAR; INTRAVENOUS AS NEEDED
Status: DISCONTINUED | OUTPATIENT
Start: 2017-06-20 | End: 2017-06-20 | Stop reason: SURG

## 2017-06-20 RX ORDER — FENTANYL CITRATE 50 UG/ML
INJECTION, SOLUTION INTRAMUSCULAR; INTRAVENOUS AS NEEDED
Status: DISCONTINUED | OUTPATIENT
Start: 2017-06-20 | End: 2017-06-20 | Stop reason: SURG

## 2017-06-20 RX ORDER — ENALAPRIL MALEATE 10 MG/1
20 TABLET ORAL DAILY
Status: DISCONTINUED | OUTPATIENT
Start: 2017-06-21 | End: 2017-06-22 | Stop reason: HOSPADM

## 2017-06-20 RX ORDER — BUPIVACAINE HYDROCHLORIDE 2.5 MG/ML
INJECTION, SOLUTION EPIDURAL; INFILTRATION; INTRACAUDAL AS NEEDED
Status: DISCONTINUED | OUTPATIENT
Start: 2017-06-20 | End: 2017-06-20 | Stop reason: HOSPADM

## 2017-06-20 RX ORDER — ACEBUTOLOL HYDROCHLORIDE 200 MG/1
200 CAPSULE ORAL
Status: DISCONTINUED | OUTPATIENT
Start: 2017-06-21 | End: 2017-06-21 | Stop reason: RX

## 2017-06-20 RX ORDER — FAMOTIDINE 20 MG/1
20 TABLET, FILM COATED ORAL 2 TIMES DAILY
Status: DISCONTINUED | OUTPATIENT
Start: 2017-06-21 | End: 2017-06-21

## 2017-06-20 RX ORDER — FENTANYL CITRATE/PF 50 MCG/ML
25 SYRINGE (ML) INJECTION
Status: COMPLETED | OUTPATIENT
Start: 2017-06-20 | End: 2017-06-20

## 2017-06-20 RX ORDER — AMLODIPINE BESYLATE 2.5 MG/1
2.5 TABLET ORAL 2 TIMES DAILY
Status: DISCONTINUED | OUTPATIENT
Start: 2017-06-20 | End: 2017-06-22 | Stop reason: HOSPADM

## 2017-06-20 RX ORDER — IBUPROFEN 600 MG/1
600 TABLET ORAL EVERY 4 HOURS PRN
Status: DISCONTINUED | OUTPATIENT
Start: 2017-06-20 | End: 2017-06-22 | Stop reason: HOSPADM

## 2017-06-20 RX ORDER — ATORVASTATIN CALCIUM 40 MG/1
40 TABLET, FILM COATED ORAL
Status: DISCONTINUED | OUTPATIENT
Start: 2017-06-20 | End: 2017-06-22 | Stop reason: HOSPADM

## 2017-06-20 RX ORDER — PROPOFOL 10 MG/ML
INJECTION, EMULSION INTRAVENOUS AS NEEDED
Status: DISCONTINUED | OUTPATIENT
Start: 2017-06-20 | End: 2017-06-20 | Stop reason: SURG

## 2017-06-20 RX ORDER — SODIUM CHLORIDE, SODIUM LACTATE, POTASSIUM CHLORIDE, CALCIUM CHLORIDE 600; 310; 30; 20 MG/100ML; MG/100ML; MG/100ML; MG/100ML
INJECTION, SOLUTION INTRAVENOUS CONTINUOUS PRN
Status: DISCONTINUED | OUTPATIENT
Start: 2017-06-20 | End: 2017-06-20 | Stop reason: SURG

## 2017-06-20 RX ORDER — SODIUM CHLORIDE, SODIUM LACTATE, POTASSIUM CHLORIDE, CALCIUM CHLORIDE 600; 310; 30; 20 MG/100ML; MG/100ML; MG/100ML; MG/100ML
100 INJECTION, SOLUTION INTRAVENOUS CONTINUOUS
Status: DISCONTINUED | OUTPATIENT
Start: 2017-06-20 | End: 2017-06-21

## 2017-06-20 RX ORDER — INSULIN GLARGINE 100 [IU]/ML
45 INJECTION, SOLUTION SUBCUTANEOUS
Status: DISCONTINUED | OUTPATIENT
Start: 2017-06-20 | End: 2017-06-21

## 2017-06-20 RX ORDER — MIDAZOLAM HYDROCHLORIDE 1 MG/ML
INJECTION INTRAMUSCULAR; INTRAVENOUS AS NEEDED
Status: DISCONTINUED | OUTPATIENT
Start: 2017-06-20 | End: 2017-06-20 | Stop reason: SURG

## 2017-06-20 RX ORDER — LEVOTHYROXINE SODIUM 0.05 MG/1
50 TABLET ORAL
Status: DISCONTINUED | OUTPATIENT
Start: 2017-06-21 | End: 2017-06-22 | Stop reason: HOSPADM

## 2017-06-20 RX ORDER — OXYCODONE HYDROCHLORIDE AND ACETAMINOPHEN 5; 325 MG/1; MG/1
1 TABLET ORAL EVERY 4 HOURS PRN
Status: DISCONTINUED | OUTPATIENT
Start: 2017-06-20 | End: 2017-06-22 | Stop reason: HOSPADM

## 2017-06-20 RX ORDER — LIDOCAINE HYDROCHLORIDE 10 MG/ML
INJECTION, SOLUTION EPIDURAL; INFILTRATION; INTRACAUDAL; PERINEURAL AS NEEDED
Status: DISCONTINUED | OUTPATIENT
Start: 2017-06-20 | End: 2017-06-20 | Stop reason: SURG

## 2017-06-20 RX ORDER — ONDANSETRON 2 MG/ML
INJECTION INTRAMUSCULAR; INTRAVENOUS AS NEEDED
Status: DISCONTINUED | OUTPATIENT
Start: 2017-06-20 | End: 2017-06-20 | Stop reason: SURG

## 2017-06-20 RX ORDER — ONDANSETRON 2 MG/ML
4 INJECTION INTRAMUSCULAR; INTRAVENOUS ONCE AS NEEDED
Status: DISCONTINUED | OUTPATIENT
Start: 2017-06-20 | End: 2017-06-20 | Stop reason: HOSPADM

## 2017-06-20 RX ORDER — MELATONIN
2000 DAILY
COMMUNITY

## 2017-06-20 RX ORDER — DOCUSATE SODIUM 100 MG/1
100 CAPSULE, LIQUID FILLED ORAL 2 TIMES DAILY
Status: DISCONTINUED | OUTPATIENT
Start: 2017-06-20 | End: 2017-06-22 | Stop reason: HOSPADM

## 2017-06-20 RX ORDER — SODIUM CHLORIDE, SODIUM LACTATE, POTASSIUM CHLORIDE, CALCIUM CHLORIDE 600; 310; 30; 20 MG/100ML; MG/100ML; MG/100ML; MG/100ML
125 INJECTION, SOLUTION INTRAVENOUS CONTINUOUS
Status: DISCONTINUED | OUTPATIENT
Start: 2017-06-20 | End: 2017-06-22

## 2017-06-20 RX ORDER — EPHEDRINE SULFATE 50 MG/ML
INJECTION, SOLUTION INTRAVENOUS AS NEEDED
Status: DISCONTINUED | OUTPATIENT
Start: 2017-06-20 | End: 2017-06-20 | Stop reason: SURG

## 2017-06-20 RX ORDER — ROCURONIUM BROMIDE 10 MG/ML
INJECTION, SOLUTION INTRAVENOUS AS NEEDED
Status: DISCONTINUED | OUTPATIENT
Start: 2017-06-20 | End: 2017-06-20 | Stop reason: SURG

## 2017-06-20 RX ORDER — ALPRAZOLAM 0.5 MG/1
0.5 TABLET ORAL 3 TIMES DAILY PRN
Status: DISCONTINUED | OUTPATIENT
Start: 2017-06-20 | End: 2017-06-22 | Stop reason: HOSPADM

## 2017-06-20 RX ORDER — SODIUM CHLORIDE 9 MG/ML
INJECTION, SOLUTION INTRAVENOUS CONTINUOUS PRN
Status: DISCONTINUED | OUTPATIENT
Start: 2017-06-20 | End: 2017-06-20 | Stop reason: SURG

## 2017-06-20 RX ADMIN — MIDAZOLAM HYDROCHLORIDE 2 MG: 1 INJECTION, SOLUTION INTRAMUSCULAR; INTRAVENOUS at 07:14

## 2017-06-20 RX ADMIN — METRONIDAZOLE 500 MG: 500 SOLUTION INTRAVENOUS at 08:09

## 2017-06-20 RX ADMIN — LIDOCAINE HYDROCHLORIDE 50 MG: 10 INJECTION, SOLUTION EPIDURAL; INFILTRATION; INTRACAUDAL; PERINEURAL at 07:19

## 2017-06-20 RX ADMIN — EPHEDRINE SULFATE 10 MG: 50 INJECTION, SOLUTION INTRAMUSCULAR; INTRAVENOUS; SUBCUTANEOUS at 07:45

## 2017-06-20 RX ADMIN — ROCURONIUM BROMIDE 10 MG: 10 INJECTION, SOLUTION INTRAVENOUS at 08:19

## 2017-06-20 RX ADMIN — SODIUM CHLORIDE, SODIUM LACTATE, POTASSIUM CHLORIDE, AND CALCIUM CHLORIDE 125 ML/HR: .6; .31; .03; .02 INJECTION, SOLUTION INTRAVENOUS at 15:37

## 2017-06-20 RX ADMIN — CEFAZOLIN SODIUM 2000 MG: 2 SOLUTION INTRAVENOUS at 07:30

## 2017-06-20 RX ADMIN — EPHEDRINE SULFATE 10 MG: 50 INJECTION, SOLUTION INTRAMUSCULAR; INTRAVENOUS; SUBCUTANEOUS at 07:49

## 2017-06-20 RX ADMIN — ATORVASTATIN CALCIUM 40 MG: 40 TABLET, FILM COATED ORAL at 21:12

## 2017-06-20 RX ADMIN — EPHEDRINE SULFATE 5 MG: 50 INJECTION, SOLUTION INTRAMUSCULAR; INTRAVENOUS; SUBCUTANEOUS at 07:43

## 2017-06-20 RX ADMIN — HYDROMORPHONE HYDROCHLORIDE 0.2 MG: 1 INJECTION, SOLUTION INTRAMUSCULAR; INTRAVENOUS; SUBCUTANEOUS at 09:10

## 2017-06-20 RX ADMIN — INSULIN GLARGINE 45 UNITS: 100 INJECTION, SOLUTION SUBCUTANEOUS at 21:11

## 2017-06-20 RX ADMIN — ROCURONIUM BROMIDE 10 MG: 10 INJECTION, SOLUTION INTRAVENOUS at 08:47

## 2017-06-20 RX ADMIN — SODIUM CHLORIDE, SODIUM LACTATE, POTASSIUM CHLORIDE, AND CALCIUM CHLORIDE 125 ML/HR: .6; .31; .03; .02 INJECTION, SOLUTION INTRAVENOUS at 21:04

## 2017-06-20 RX ADMIN — INSULIN LISPRO 4 UNITS: 100 INJECTION, SOLUTION INTRAVENOUS; SUBCUTANEOUS at 16:32

## 2017-06-20 RX ADMIN — HYDROMORPHONE HYDROCHLORIDE 0.4 MG: 1 INJECTION, SOLUTION INTRAMUSCULAR; INTRAVENOUS; SUBCUTANEOUS at 08:28

## 2017-06-20 RX ADMIN — AMLODIPINE BESYLATE 2.5 MG: 2.5 TABLET ORAL at 22:43

## 2017-06-20 RX ADMIN — ONDANSETRON 4 MG: 2 INJECTION INTRAMUSCULAR; INTRAVENOUS at 09:09

## 2017-06-20 RX ADMIN — DEXAMETHASONE SODIUM PHOSPHATE 10 MG: 10 INJECTION INTRAMUSCULAR; INTRAVENOUS at 07:49

## 2017-06-20 RX ADMIN — PROPOFOL 200 MG: 10 INJECTION, EMULSION INTRAVENOUS at 07:19

## 2017-06-20 RX ADMIN — ROCURONIUM BROMIDE 10 MG: 10 INJECTION, SOLUTION INTRAVENOUS at 07:57

## 2017-06-20 RX ADMIN — FENTANYL CITRATE 25 MCG: 50 INJECTION INTRAMUSCULAR; INTRAVENOUS at 12:41

## 2017-06-20 RX ADMIN — FENTANYL CITRATE 25 MCG: 50 INJECTION INTRAMUSCULAR; INTRAVENOUS at 13:00

## 2017-06-20 RX ADMIN — HYDROMORPHONE HYDROCHLORIDE 0.5 MG: 1 INJECTION, SOLUTION INTRAMUSCULAR; INTRAVENOUS; SUBCUTANEOUS at 16:04

## 2017-06-20 RX ADMIN — FENTANYL CITRATE 25 MCG: 50 INJECTION INTRAMUSCULAR; INTRAVENOUS at 13:44

## 2017-06-20 RX ADMIN — ALPRAZOLAM 0.5 MG: 0.5 TABLET ORAL at 22:43

## 2017-06-20 RX ADMIN — ROCURONIUM BROMIDE 50 MG: 10 INJECTION, SOLUTION INTRAVENOUS at 07:19

## 2017-06-20 RX ADMIN — PHENYLEPHRINE HYDROCHLORIDE 20 MCG/MIN: 10 INJECTION INTRAVENOUS at 07:52

## 2017-06-20 RX ADMIN — SODIUM CHLORIDE, SODIUM LACTATE, POTASSIUM CHLORIDE, AND CALCIUM CHLORIDE: .6; .31; .03; .02 INJECTION, SOLUTION INTRAVENOUS at 09:30

## 2017-06-20 RX ADMIN — SODIUM CHLORIDE, SODIUM LACTATE, POTASSIUM CHLORIDE, AND CALCIUM CHLORIDE: .6; .31; .03; .02 INJECTION, SOLUTION INTRAVENOUS at 07:00

## 2017-06-20 RX ADMIN — NEOSTIGMINE METHYLSULFATE 4 MG: 1 INJECTION, SOLUTION INTRAMUSCULAR; INTRAVENOUS; SUBCUTANEOUS at 09:28

## 2017-06-20 RX ADMIN — FENTANYL CITRATE 100 MCG: 50 INJECTION, SOLUTION INTRAMUSCULAR; INTRAVENOUS at 07:19

## 2017-06-20 RX ADMIN — FENTANYL CITRATE 25 MCG: 50 INJECTION INTRAMUSCULAR; INTRAVENOUS at 11:01

## 2017-06-20 RX ADMIN — INSULIN HUMAN 6 UNITS: 100 INJECTION, SOLUTION PARENTERAL at 10:05

## 2017-06-20 RX ADMIN — ENOXAPARIN SODIUM 40 MG: 40 INJECTION SUBCUTANEOUS at 06:40

## 2017-06-20 RX ADMIN — SODIUM CHLORIDE: 0.9 INJECTION, SOLUTION INTRAVENOUS at 07:29

## 2017-06-20 RX ADMIN — GLYCOPYRROLATE 0.6 MG: 0.2 INJECTION INTRAMUSCULAR; INTRAVENOUS at 09:28

## 2017-06-21 LAB
ANION GAP SERPL CALCULATED.3IONS-SCNC: 10 MMOL/L (ref 4–13)
ANION GAP SERPL CALCULATED.3IONS-SCNC: 9 MMOL/L (ref 4–13)
BUN SERPL-MCNC: 17 MG/DL (ref 5–25)
BUN SERPL-MCNC: 19 MG/DL (ref 5–25)
CALCIUM SERPL-MCNC: 9 MG/DL (ref 8.3–10.1)
CALCIUM SERPL-MCNC: 9.1 MG/DL (ref 8.3–10.1)
CHLORIDE SERPL-SCNC: 103 MMOL/L (ref 100–108)
CHLORIDE SERPL-SCNC: 104 MMOL/L (ref 100–108)
CO2 SERPL-SCNC: 25 MMOL/L (ref 21–32)
CO2 SERPL-SCNC: 25 MMOL/L (ref 21–32)
CREAT SERPL-MCNC: 1.15 MG/DL (ref 0.6–1.3)
CREAT SERPL-MCNC: 1.23 MG/DL (ref 0.6–1.3)
ERYTHROCYTE [DISTWIDTH] IN BLOOD BY AUTOMATED COUNT: 16.1 % (ref 11.6–15.1)
GFR SERPL CREATININE-BSD FRML MDRD: 43.2 ML/MIN/1.73SQ M
GFR SERPL CREATININE-BSD FRML MDRD: 46.6 ML/MIN/1.73SQ M
GLUCOSE SERPL-MCNC: 155 MG/DL (ref 65–140)
GLUCOSE SERPL-MCNC: 184 MG/DL (ref 65–140)
GLUCOSE SERPL-MCNC: 201 MG/DL (ref 65–140)
GLUCOSE SERPL-MCNC: 222 MG/DL (ref 65–140)
GLUCOSE SERPL-MCNC: 238 MG/DL (ref 65–140)
GLUCOSE SERPL-MCNC: 295 MG/DL (ref 65–140)
HCT VFR BLD AUTO: 34 % (ref 34.8–46.1)
HGB BLD-MCNC: 11.2 G/DL (ref 11.5–15.4)
MAGNESIUM SERPL-MCNC: 1.5 MG/DL (ref 1.6–2.6)
MCH RBC QN AUTO: 27.5 PG (ref 26.8–34.3)
MCHC RBC AUTO-ENTMCNC: 32.9 G/DL (ref 31.4–37.4)
MCV RBC AUTO: 84 FL (ref 82–98)
PLATELET # BLD AUTO: 299 THOUSANDS/UL (ref 149–390)
PMV BLD AUTO: 11.5 FL (ref 8.9–12.7)
POTASSIUM SERPL-SCNC: 4.3 MMOL/L (ref 3.5–5.3)
POTASSIUM SERPL-SCNC: 4.3 MMOL/L (ref 3.5–5.3)
RBC # BLD AUTO: 4.07 MILLION/UL (ref 3.81–5.12)
SODIUM SERPL-SCNC: 138 MMOL/L (ref 136–145)
SODIUM SERPL-SCNC: 138 MMOL/L (ref 136–145)
WBC # BLD AUTO: 14.31 THOUSAND/UL (ref 4.31–10.16)

## 2017-06-21 PROCEDURE — 83735 ASSAY OF MAGNESIUM: CPT | Performed by: OBSTETRICS & GYNECOLOGY

## 2017-06-21 PROCEDURE — 80048 BASIC METABOLIC PNL TOTAL CA: CPT | Performed by: OBSTETRICS & GYNECOLOGY

## 2017-06-21 PROCEDURE — 85027 COMPLETE CBC AUTOMATED: CPT | Performed by: OBSTETRICS & GYNECOLOGY

## 2017-06-21 PROCEDURE — 82948 REAGENT STRIP/BLOOD GLUCOSE: CPT

## 2017-06-21 RX ORDER — INSULIN GLARGINE 100 [IU]/ML
45 INJECTION, SOLUTION SUBCUTANEOUS EVERY 12 HOURS SCHEDULED
Status: DISCONTINUED | OUTPATIENT
Start: 2017-06-21 | End: 2017-06-21

## 2017-06-21 RX ORDER — FAMOTIDINE 20 MG/1
20 TABLET, FILM COATED ORAL DAILY
Status: DISCONTINUED | OUTPATIENT
Start: 2017-06-21 | End: 2017-06-22 | Stop reason: HOSPADM

## 2017-06-21 RX ORDER — INSULIN GLARGINE 100 [IU]/ML
40 INJECTION, SOLUTION SUBCUTANEOUS EVERY 12 HOURS SCHEDULED
Status: DISCONTINUED | OUTPATIENT
Start: 2017-06-21 | End: 2017-06-22 | Stop reason: HOSPADM

## 2017-06-21 RX ORDER — ACEBUTOLOL HYDROCHLORIDE 200 MG/1
200 CAPSULE ORAL
Status: DISCONTINUED | OUTPATIENT
Start: 2017-06-21 | End: 2017-06-21 | Stop reason: RX

## 2017-06-21 RX ORDER — ACEBUTOLOL HYDROCHLORIDE 200 MG/1
200 CAPSULE ORAL
Status: DISCONTINUED | OUTPATIENT
Start: 2017-06-22 | End: 2017-06-22 | Stop reason: HOSPADM

## 2017-06-21 RX ADMIN — ENOXAPARIN SODIUM 40 MG: 40 INJECTION SUBCUTANEOUS at 10:00

## 2017-06-21 RX ADMIN — METOPROLOL TARTRATE 5 MG: 5 INJECTION INTRAVENOUS at 00:12

## 2017-06-21 RX ADMIN — LEVOTHYROXINE SODIUM 50 MCG: 50 TABLET ORAL at 05:05

## 2017-06-21 RX ADMIN — ATORVASTATIN CALCIUM 40 MG: 40 TABLET, FILM COATED ORAL at 21:48

## 2017-06-21 RX ADMIN — OXYCODONE HYDROCHLORIDE AND ACETAMINOPHEN 1 TABLET: 5; 325 TABLET ORAL at 06:42

## 2017-06-21 RX ADMIN — DULOXETINE HYDROCHLORIDE 60 MG: 60 CAPSULE, DELAYED RELEASE ORAL at 10:00

## 2017-06-21 RX ADMIN — ASPIRIN 81 MG 81 MG: 81 TABLET ORAL at 10:00

## 2017-06-21 RX ADMIN — INSULIN LISPRO 2 UNITS: 100 INJECTION, SOLUTION INTRAVENOUS; SUBCUTANEOUS at 09:00

## 2017-06-21 RX ADMIN — AMLODIPINE BESYLATE 2.5 MG: 2.5 TABLET ORAL at 10:00

## 2017-06-21 RX ADMIN — METFORMIN HYDROCHLORIDE 500 MG: 500 TABLET, FILM COATED ORAL at 10:00

## 2017-06-21 RX ADMIN — DOCUSATE SODIUM 100 MG: 100 CAPSULE, LIQUID FILLED ORAL at 10:00

## 2017-06-21 RX ADMIN — FAMOTIDINE 20 MG: 20 TABLET ORAL at 10:00

## 2017-06-21 RX ADMIN — INSULIN LISPRO 2 UNITS: 100 INJECTION, SOLUTION INTRAVENOUS; SUBCUTANEOUS at 17:20

## 2017-06-21 RX ADMIN — SODIUM CHLORIDE, SODIUM LACTATE, POTASSIUM CHLORIDE, AND CALCIUM CHLORIDE 100 ML/HR: .6; .31; .03; .02 INJECTION, SOLUTION INTRAVENOUS at 12:32

## 2017-06-21 RX ADMIN — ALPRAZOLAM 0.5 MG: 0.5 TABLET ORAL at 10:19

## 2017-06-21 RX ADMIN — METFORMIN HYDROCHLORIDE 500 MG: 500 TABLET, FILM COATED ORAL at 17:19

## 2017-06-21 RX ADMIN — INSULIN LISPRO 12 UNITS: 100 INJECTION, SOLUTION INTRAVENOUS; SUBCUTANEOUS at 17:19

## 2017-06-21 RX ADMIN — INSULIN GLARGINE 40 UNITS: 100 INJECTION, SOLUTION SUBCUTANEOUS at 21:48

## 2017-06-21 RX ADMIN — INSULIN GLARGINE 45 UNITS: 100 INJECTION, SOLUTION SUBCUTANEOUS at 09:40

## 2017-06-21 RX ADMIN — SODIUM CHLORIDE, SODIUM LACTATE, POTASSIUM CHLORIDE, AND CALCIUM CHLORIDE 125 ML/HR: .6; .31; .03; .02 INJECTION, SOLUTION INTRAVENOUS at 21:42

## 2017-06-21 RX ADMIN — SODIUM CHLORIDE, SODIUM LACTATE, POTASSIUM CHLORIDE, AND CALCIUM CHLORIDE 125 ML/HR: .6; .31; .03; .02 INJECTION, SOLUTION INTRAVENOUS at 04:57

## 2017-06-21 RX ADMIN — INSULIN LISPRO 4 UNITS: 100 INJECTION, SOLUTION INTRAVENOUS; SUBCUTANEOUS at 12:28

## 2017-06-21 RX ADMIN — AMLODIPINE BESYLATE 2.5 MG: 2.5 TABLET ORAL at 21:48

## 2017-06-21 RX ADMIN — ALPRAZOLAM 0.5 MG: 0.5 TABLET ORAL at 21:53

## 2017-06-21 RX ADMIN — OXYCODONE HYDROCHLORIDE AND ACETAMINOPHEN 1 TABLET: 5; 325 TABLET ORAL at 15:42

## 2017-06-21 RX ADMIN — DOCUSATE SODIUM 100 MG: 100 CAPSULE, LIQUID FILLED ORAL at 17:19

## 2017-06-21 RX ADMIN — ENALAPRIL MALEATE 20 MG: 10 TABLET ORAL at 10:00

## 2017-06-22 VITALS
BODY MASS INDEX: 38.51 KG/M2 | DIASTOLIC BLOOD PRESSURE: 82 MMHG | HEIGHT: 61 IN | OXYGEN SATURATION: 93 % | RESPIRATION RATE: 20 BRPM | WEIGHT: 204 LBS | SYSTOLIC BLOOD PRESSURE: 158 MMHG | HEART RATE: 64 BPM | TEMPERATURE: 99 F

## 2017-06-22 PROBLEM — N85.01 ENDOMETRIAL HYPERPLASIA, COMPLEX: Status: RESOLVED | Noted: 2017-06-20 | Resolved: 2017-06-22

## 2017-06-22 LAB
ERYTHROCYTE [DISTWIDTH] IN BLOOD BY AUTOMATED COUNT: 16.3 % (ref 11.6–15.1)
GLUCOSE SERPL-MCNC: 133 MG/DL (ref 65–140)
GLUCOSE SERPL-MCNC: 183 MG/DL (ref 65–140)
HCT VFR BLD AUTO: 31.9 % (ref 34.8–46.1)
HGB BLD-MCNC: 10.3 G/DL (ref 11.5–15.4)
MAGNESIUM SERPL-MCNC: 1.6 MG/DL (ref 1.6–2.6)
MCH RBC QN AUTO: 27 PG (ref 26.8–34.3)
MCHC RBC AUTO-ENTMCNC: 32.3 G/DL (ref 31.4–37.4)
MCV RBC AUTO: 84 FL (ref 82–98)
PLATELET # BLD AUTO: 253 THOUSANDS/UL (ref 149–390)
PMV BLD AUTO: 10.7 FL (ref 8.9–12.7)
RBC # BLD AUTO: 3.82 MILLION/UL (ref 3.81–5.12)
WBC # BLD AUTO: 10.44 THOUSAND/UL (ref 4.31–10.16)

## 2017-06-22 PROCEDURE — 85027 COMPLETE CBC AUTOMATED: CPT | Performed by: OBSTETRICS & GYNECOLOGY

## 2017-06-22 PROCEDURE — 82948 REAGENT STRIP/BLOOD GLUCOSE: CPT

## 2017-06-22 PROCEDURE — 83735 ASSAY OF MAGNESIUM: CPT | Performed by: OBSTETRICS & GYNECOLOGY

## 2017-06-22 RX ORDER — OXYCODONE HYDROCHLORIDE AND ACETAMINOPHEN 5; 325 MG/1; MG/1
1-2 TABLET ORAL EVERY 4 HOURS PRN
Refills: 0
Start: 2017-06-22 | End: 2017-07-02

## 2017-06-22 RX ORDER — ONDANSETRON 2 MG/ML
4 INJECTION INTRAMUSCULAR; INTRAVENOUS EVERY 6 HOURS PRN
Status: DISCONTINUED | OUTPATIENT
Start: 2017-06-22 | End: 2017-06-22 | Stop reason: HOSPADM

## 2017-06-22 RX ORDER — INSULIN GLARGINE 100 [IU]/ML
40 INJECTION, SOLUTION SUBCUTANEOUS 2 TIMES DAILY
Qty: 10 ML | Refills: 0
Start: 2017-06-22 | End: 2019-06-06

## 2017-06-22 RX ADMIN — FAMOTIDINE 20 MG: 20 TABLET ORAL at 09:09

## 2017-06-22 RX ADMIN — METFORMIN HYDROCHLORIDE 500 MG: 500 TABLET, FILM COATED ORAL at 09:09

## 2017-06-22 RX ADMIN — AMLODIPINE BESYLATE 2.5 MG: 2.5 TABLET ORAL at 09:09

## 2017-06-22 RX ADMIN — ASPIRIN 81 MG 81 MG: 81 TABLET ORAL at 09:10

## 2017-06-22 RX ADMIN — OXYCODONE HYDROCHLORIDE AND ACETAMINOPHEN 1 TABLET: 5; 325 TABLET ORAL at 13:40

## 2017-06-22 RX ADMIN — DOCUSATE SODIUM 100 MG: 100 CAPSULE, LIQUID FILLED ORAL at 09:09

## 2017-06-22 RX ADMIN — ENOXAPARIN SODIUM 40 MG: 40 INJECTION SUBCUTANEOUS at 09:09

## 2017-06-22 RX ADMIN — METOPROLOL TARTRATE 5 MG: 5 INJECTION INTRAVENOUS at 03:13

## 2017-06-22 RX ADMIN — INSULIN LISPRO 12 UNITS: 100 INJECTION, SOLUTION INTRAVENOUS; SUBCUTANEOUS at 09:08

## 2017-06-22 RX ADMIN — ENALAPRIL MALEATE 20 MG: 10 TABLET ORAL at 12:59

## 2017-06-22 RX ADMIN — DULOXETINE HYDROCHLORIDE 60 MG: 60 CAPSULE, DELAYED RELEASE ORAL at 09:09

## 2017-06-22 RX ADMIN — LEVOTHYROXINE SODIUM 50 MCG: 50 TABLET ORAL at 04:55

## 2017-06-22 RX ADMIN — SODIUM CHLORIDE, SODIUM LACTATE, POTASSIUM CHLORIDE, AND CALCIUM CHLORIDE 125 ML/HR: .6; .31; .03; .02 INJECTION, SOLUTION INTRAVENOUS at 05:00

## 2017-06-22 RX ADMIN — OXYCODONE HYDROCHLORIDE AND ACETAMINOPHEN 1 TABLET: 5; 325 TABLET ORAL at 04:58

## 2017-06-22 RX ADMIN — INSULIN LISPRO 12 UNITS: 100 INJECTION, SOLUTION INTRAVENOUS; SUBCUTANEOUS at 12:59

## 2017-06-22 RX ADMIN — INSULIN LISPRO 1 UNITS: 100 INJECTION, SOLUTION INTRAVENOUS; SUBCUTANEOUS at 13:01

## 2017-06-22 RX ADMIN — INSULIN GLARGINE 40 UNITS: 100 INJECTION, SOLUTION SUBCUTANEOUS at 09:10

## 2017-07-03 ENCOUNTER — ALLSCRIPTS OFFICE VISIT (OUTPATIENT)
Dept: OTHER | Facility: OTHER | Age: 72
End: 2017-07-03

## 2017-07-18 ENCOUNTER — ALLSCRIPTS OFFICE VISIT (OUTPATIENT)
Dept: OTHER | Facility: OTHER | Age: 72
End: 2017-07-18

## 2017-07-26 ENCOUNTER — GENERIC CONVERSION - ENCOUNTER (OUTPATIENT)
Dept: OTHER | Facility: OTHER | Age: 72
End: 2017-07-26

## 2017-07-27 ENCOUNTER — ALLSCRIPTS OFFICE VISIT (OUTPATIENT)
Dept: OTHER | Facility: OTHER | Age: 72
End: 2017-07-27

## 2017-08-02 ENCOUNTER — ALLSCRIPTS OFFICE VISIT (OUTPATIENT)
Dept: OTHER | Facility: OTHER | Age: 72
End: 2017-08-02

## 2017-08-08 ENCOUNTER — GENERIC CONVERSION - ENCOUNTER (OUTPATIENT)
Dept: OTHER | Facility: OTHER | Age: 72
End: 2017-08-08

## 2017-08-28 ENCOUNTER — GENERIC CONVERSION - ENCOUNTER (OUTPATIENT)
Dept: OTHER | Facility: OTHER | Age: 72
End: 2017-08-28

## 2017-09-01 ENCOUNTER — GENERIC CONVERSION - ENCOUNTER (OUTPATIENT)
Dept: OTHER | Facility: OTHER | Age: 72
End: 2017-09-01

## 2017-09-18 DIAGNOSIS — D64.9 ANEMIA: ICD-10-CM

## 2017-10-02 DIAGNOSIS — N18.9 CHRONIC KIDNEY DISEASE: ICD-10-CM

## 2017-10-23 ENCOUNTER — APPOINTMENT (OUTPATIENT)
Dept: LAB | Facility: CLINIC | Age: 72
End: 2017-10-23
Payer: COMMERCIAL

## 2017-10-23 ENCOUNTER — TRANSCRIBE ORDERS (OUTPATIENT)
Dept: LAB | Facility: CLINIC | Age: 72
End: 2017-10-23

## 2017-10-23 DIAGNOSIS — N18.9 CHRONIC KIDNEY DISEASE: ICD-10-CM

## 2017-10-23 LAB
ANION GAP SERPL CALCULATED.3IONS-SCNC: 11 MMOL/L (ref 4–13)
BUN SERPL-MCNC: 21 MG/DL (ref 5–25)
CALCIUM SERPL-MCNC: 9.1 MG/DL (ref 8.3–10.1)
CHLORIDE SERPL-SCNC: 103 MMOL/L (ref 100–108)
CO2 SERPL-SCNC: 23 MMOL/L (ref 21–32)
CREAT SERPL-MCNC: 1.35 MG/DL (ref 0.6–1.3)
CREAT UR-MCNC: 134 MG/DL
GFR SERPL CREATININE-BSD FRML MDRD: 40 ML/MIN/1.73SQ M
GLUCOSE SERPL-MCNC: 256 MG/DL (ref 65–140)
MICROALBUMIN UR-MCNC: 58.7 MG/L (ref 0–20)
MICROALBUMIN/CREAT 24H UR: 44 MG/G CREATININE (ref 0–30)
POTASSIUM SERPL-SCNC: 4.6 MMOL/L (ref 3.5–5.3)
SODIUM SERPL-SCNC: 137 MMOL/L (ref 136–145)

## 2017-10-23 PROCEDURE — 36415 COLL VENOUS BLD VENIPUNCTURE: CPT

## 2017-10-23 PROCEDURE — 80048 BASIC METABOLIC PNL TOTAL CA: CPT

## 2017-10-23 PROCEDURE — 82043 UR ALBUMIN QUANTITATIVE: CPT

## 2017-10-23 PROCEDURE — 82570 ASSAY OF URINE CREATININE: CPT

## 2017-10-24 ENCOUNTER — ALLSCRIPTS OFFICE VISIT (OUTPATIENT)
Dept: OTHER | Facility: OTHER | Age: 72
End: 2017-10-24

## 2017-11-10 ENCOUNTER — TRANSCRIBE ORDERS (OUTPATIENT)
Dept: ADMINISTRATIVE | Facility: HOSPITAL | Age: 72
End: 2017-11-10

## 2017-11-10 DIAGNOSIS — R91.1 LUNG NODULE: Primary | ICD-10-CM

## 2017-11-10 DIAGNOSIS — M48.07 LUMBOSACRAL STENOSIS: Primary | ICD-10-CM

## 2017-11-17 ENCOUNTER — LAB CONVERSION - ENCOUNTER (OUTPATIENT)
Dept: OTHER | Facility: OTHER | Age: 72
End: 2017-11-17

## 2017-11-17 LAB
A/G RATIO (HISTORICAL): 1.2 (CALC) (ref 1–2.5)
ALBUMIN SERPL BCP-MCNC: 4.2 G/DL (ref 3.6–5.1)
ALP SERPL-CCNC: 109 U/L (ref 33–130)
ALT SERPL W P-5'-P-CCNC: 16 U/L (ref 6–29)
AST SERPL W P-5'-P-CCNC: 20 U/L (ref 10–35)
BILIRUB SERPL-MCNC: 0.5 MG/DL (ref 0.2–1.2)
BUN SERPL-MCNC: 14 MG/DL (ref 7–25)
BUN/CREA RATIO (HISTORICAL): 12 (CALC) (ref 6–22)
CALCIUM SERPL-MCNC: 9.8 MG/DL (ref 8.6–10.4)
CHLORIDE SERPL-SCNC: 103 MMOL/L (ref 98–110)
CHOLEST SERPL-MCNC: 161 MG/DL
CHOLEST/HDLC SERPL: 3.7 (CALC)
CO2 SERPL-SCNC: 28 MMOL/L (ref 20–31)
CREAT SERPL-MCNC: 1.18 MG/DL (ref 0.6–0.93)
EGFR AFRICAN AMERICAN (HISTORICAL): 54 ML/MIN/1.73M2
EGFR-AMERICAN CALC (HISTORICAL): 47 ML/MIN/1.73M2
GAMMA GLOBULIN (HISTORICAL): 3.4 G/DL (CALC) (ref 1.9–3.7)
GLUCOSE (HISTORICAL): 105 MG/DL (ref 65–99)
HBA1C MFR BLD HPLC: 8.6 % OF TOTAL HGB
HDLC SERPL-MCNC: 44 MG/DL
LDL CHOLESTEROL (HISTORICAL): 93 MG/DL (CALC)
NON-HDL-CHOL (CHOL-HDL) (HISTORICAL): 117 MG/DL (CALC)
POTASSIUM SERPL-SCNC: 4.8 MMOL/L (ref 3.5–5.3)
SODIUM SERPL-SCNC: 138 MMOL/L (ref 135–146)
TOTAL PROTEIN (HISTORICAL): 7.6 G/DL (ref 6.1–8.1)
TRIGL SERPL-MCNC: 147 MG/DL

## 2017-11-18 DIAGNOSIS — E11.9 TYPE 2 DIABETES MELLITUS WITHOUT COMPLICATIONS (HCC): ICD-10-CM

## 2017-11-18 DIAGNOSIS — D64.9 ANEMIA: ICD-10-CM

## 2017-11-18 DIAGNOSIS — M54.9 DORSALGIA: ICD-10-CM

## 2017-11-18 DIAGNOSIS — R91.1 SOLITARY PULMONARY NODULE: ICD-10-CM

## 2017-11-19 ENCOUNTER — GENERIC CONVERSION - ENCOUNTER (OUTPATIENT)
Dept: OTHER | Facility: OTHER | Age: 72
End: 2017-11-19

## 2017-11-20 ENCOUNTER — HOSPITAL ENCOUNTER (OUTPATIENT)
Dept: CT IMAGING | Facility: HOSPITAL | Age: 72
Discharge: HOME | End: 2017-11-20
Attending: INTERNAL MEDICINE
Payer: COMMERCIAL

## 2017-11-20 DIAGNOSIS — R91.1 SOLITARY PULMONARY NODULE: ICD-10-CM

## 2017-11-20 LAB — HBA1C MFR BLD HPLC: 8.6 %

## 2017-11-20 PROCEDURE — 71250 CT THORAX DX C-: CPT

## 2017-11-21 ENCOUNTER — GENERIC CONVERSION - ENCOUNTER (OUTPATIENT)
Dept: OTHER | Facility: OTHER | Age: 72
End: 2017-11-21

## 2017-11-21 ENCOUNTER — ALLSCRIPTS OFFICE VISIT (OUTPATIENT)
Dept: OTHER | Facility: OTHER | Age: 72
End: 2017-11-21

## 2017-11-27 ENCOUNTER — GENERIC CONVERSION - ENCOUNTER (OUTPATIENT)
Dept: OTHER | Facility: OTHER | Age: 72
End: 2017-11-27

## 2017-11-29 ENCOUNTER — ALLSCRIPTS OFFICE VISIT (OUTPATIENT)
Dept: OTHER | Facility: OTHER | Age: 72
End: 2017-11-29

## 2017-11-30 ENCOUNTER — HOSPITAL ENCOUNTER (OUTPATIENT)
Dept: MRI IMAGING | Facility: HOSPITAL | Age: 72
Discharge: HOME | End: 2017-11-30
Payer: COMMERCIAL

## 2017-11-30 DIAGNOSIS — M48.07 LUMBOSACRAL STENOSIS: ICD-10-CM

## 2017-11-30 PROCEDURE — 72148 MRI LUMBAR SPINE W/O DYE: CPT

## 2017-12-11 ENCOUNTER — ALLSCRIPTS OFFICE VISIT (OUTPATIENT)
Dept: OTHER | Facility: OTHER | Age: 72
End: 2017-12-11

## 2017-12-11 ENCOUNTER — APPOINTMENT (OUTPATIENT)
Dept: LAB | Facility: CLINIC | Age: 72
End: 2017-12-11
Payer: COMMERCIAL

## 2017-12-11 ENCOUNTER — GENERIC CONVERSION - ENCOUNTER (OUTPATIENT)
Dept: OTHER | Facility: OTHER | Age: 72
End: 2017-12-11

## 2017-12-11 DIAGNOSIS — D64.9 ANEMIA: ICD-10-CM

## 2017-12-11 LAB
FERRITIN SERPL-MCNC: 19 NG/ML (ref 8–388)
IRON SERPL-MCNC: 51 UG/DL (ref 50–170)
TIBC SERPL-MCNC: 403 UG/DL (ref 250–450)

## 2017-12-11 PROCEDURE — 83550 IRON BINDING TEST: CPT

## 2017-12-11 PROCEDURE — 36415 COLL VENOUS BLD VENIPUNCTURE: CPT

## 2017-12-11 PROCEDURE — 83540 ASSAY OF IRON: CPT

## 2017-12-11 PROCEDURE — 82728 ASSAY OF FERRITIN: CPT

## 2017-12-12 NOTE — CONSULTS
Assessment    1  Vascular claudication (443 9) (I73 9)   2  Lumbar stenosis with neurogenic claudication (724 03) (M48 062)   3  Back pain (724 5) (M54 9)    Plan  Back pain    · Follow-up visit in 3 months Evaluation and Treatment  Follow-up  Status: Hold For -Scheduling  Requested for: 47Ufc7955   Ordered;Back pain; Ordered By: Lilian Gamez Performed:  Due: 48TSG7657   · *1 - [facility]  please assess for lumbar ROM, corestrengthening, hip girdle/LE strengthening  Status: Active  Requested for: 63LMM0769   Ordered;Back pain; Ordered By: Lilian Gamez Performed:  Due: 62YTS5621; Last Updated By: Marlon Goff; 12/11/2017 9:48:13 AM  Care Summary provided  : Yes   · 1 - Virgil Perez MD, Fatoumata PAGE (Pain Management) Co-Management  please assess for L4/5ESI  Status: Active  Requested for: 00SBH7978   Ordered;Back pain; Ordered By: Lilian Gamez Performed:  Due: 27AQA3110; Last Updated By: Marlon Goff; 12/11/2017 9:49:29 AM  Care Summary provided  : Yes  Back pain, Vascular claudication    · *1 - [address]  limited walking distance with suspectedvascular claudication, please assess  Status: Active  Requested for: 94RKJ8644   Ordered; For: Back pain, Vascular claudication; Ordered By: Lilian Gamez Performed:  Due: 38UGZ3965  Care Summary provided  : Yes    Discussion/Summary    Referred by Dr Nadya Rosado  Mrs Kailey Appiah has limited ambulation tolerance in the settling of mild to moderate lumbar spinal stenosis  He history and exam raises concerns regarding vascular claudication as well  I have provided her with a script for PT and referral to Dr Virgil Perez  I have also requested a consultation with the vascular service  I will see her in fu in 3 months  In the meantime, I encouraged her to remain active and to pursue light aerobic activities  The patient has the current Goals: Improve function  The patent has the current Barriers: None  Patient is able to Self-Care       Self Referrals: No Hematologic/Lymphatic: no tendency for easy bleeding-- and-- no tendency for easy bruising  ROS reviewed  Active Problems    1  Abdominal pain (789 00) (R10 9)   2  Abdominal pain, epigastric (789 06) (R10 13)   3  Abnormal blood chemistry (790 6) (R79 9)   4  Abrasion (919 0) (T14 8XXA)   5  Abrasion of arm, left (913 0) (S40 812A)   6  History of Acute embolism and thrombosis of unspecified deep veins of unspecified lower extremity (453 40) (I82 409)   7  Acute upper respiratory infection (465 9) (J06 9)   8  Adenomatous polyp of colon (211 3) (D12 6)   9  Anemia (285 9) (D64 9)   10  Anosmia (781 1) (R43 0)   11  Antral ulcer (531 90) (K25 9)   12  Anxiety (300 00) (F41 9)   13  Asthma (493 90) (J45 909)   14  Atypical chest pain (786 59) (R07 89)   15  Balance problems (781 99) (R26 89)   16  Benign essential hypertension (401 1) (I10)   17  Bezoars (938)   18  Bronchitis, asthmatic (493 90) (J45 909)   19  Cervical disc disease (722 91) (M50 90)   20  Cervical disc herniation (722 0) (M50 20)   21  Cervical radiculopathy (723 4) (M54 12)   22  Cervical radiculopathy (723 4) (M54 12)   23  Cervical spinal stenosis (723 0) (M48 02)   24  Cervical spinal stenosis (723 0) (M48 02)   25  Cervical spinal stenosis (723 0) (M48 02)   26  Cervical strain (847 0) (S16 1XXA)   27  Chronic mastoiditis (383 1) (H70 10)   28  Chronic renal insufficiency (585 9) (N18 9)   29  Colon cancer screening (V76 51) (Z12 11)   30  Colon polyps (211 3) (K63 5)   31  Complex endometrial hyperplasia (621 32) (N85 01)   32  Contusion of skin with intact surface (924 9) (T14 8XXA)   33  Depression (311) (F32 9)   34  Depression with anxiety (300 4) (F41 8)   35  Diabetic polyneuropathy associated with type 2 diabetes mellitus (250 60,357 2)  (E11 42)   36  Diarrhea (787 91) (R19 7)   37  Disc degeneration, lumbar (722 52) (M51 36)   38  Dizziness (780 4) (R42)   39  DM2 (diabetes mellitus, type 2) (250 00) (E11 9)   40   DVT (deep venous thrombosis) (453 40) (I82 409)   41  Dyspepsia (536 8) (K30)   42  Early satiety (780 94) (R68 81)   43  Elevated C-reactive protein (790 95) (R79 82)   44  Encounter for screening for osteoporosis (V82 81) (Z13 820)   45  Encounter for screening mammogram for breast cancer (V76 12) (Z12 31)   46  Falls (E888 9) (W19 XXXA)   47  Fatigue (780 79) (R53 83)   48  Flushing (782 62) (R23 2)   49  Headache (784 0) (R51)   50  Heart palpitations (785 1) (R00 2)   51  History of allergy (V15 09) (Z88 9)   52  Hyperlipidemia (272 4) (E78 5)   53  Hypomagnesemia (275 2) (E83 42)   54  Influenza vaccination administered during current admission (V04 81) (Z23)   55  Initial Medicare annual wellness visit (V70 0) (Z00 00)   56  Intercostal pain (786 59) (R07 82)   57  Lack of physical activity (V69 0) (Z72 3)   58  Low vitamin B12 level (266 2) (E53 8)   59  Lumbar radicular pain (724 4) (M54 16)   60  Lumbar radiculopathy (724 4) (M54 16)   61  Lung nodule (793 11) (R91 1)   62  Macular Retinal Edema Of Both Eyes (362 83)   63  Medicare annual wellness visit, subsequent (V70 0) (Z00 00)   64  Microalbuminuria (791 0) (R80 9)   65  Multiple joint pain (719 49) (M25 50)   66  Myofascial pain syndrome (729 1) (M79 1)   67  Neck pain (723 1) (M54 2)   68  Need for influenza vaccination (V04 81) (Z23)   69  Need for pneumococcal vaccination (V03 82) (Z23)   70  Need for prophylactic vaccination and inoculation against influenza (V04 81) (Z23)   71  History of Need for prophylactic vaccination and inoculation against influenza (V04 81)  (Z23)   72  Onychomycosis (110 1) (B35 1)   73  Pain in wrist, unspecified laterality (719 43) (M25 539)   74  Palpitations (785 1) (R00 2)   75  Panic attack (300 01) (F41 0)   76  Post-nasal drip (784 91) (R09 82)   77  Postoperative visit (V58 49) (Z48 89)   78  Pounding headache (784 0) (R51)   79  Pounding heartbeat (785 1) (R00 2)   80  Preoperative clearance (V72 84) (Z01 818)   81  Primary hypothyroidism (244 9) (E03 9)   82  Psoriatic arthropathy (696 0) (L40 50)   83  Right knee pain (719 46) (M25 561)   84  Screening for genitourinary condition (V81 6) (Z13 89)   85  Screening for neurological condition (V80 09) (Z13 89)   86  Shortness of breath (786 05) (R06 02)   87  Sinusitis (473 9) (J32 9)   88  Spinal stenosis (724 00) (M48 00)   89  Spondylosis of cervical region without myelopathy or radiculopathy (721 0) (M47 812)   90  Spondylosis of lumbar region without myelopathy or radiculopathy (721 3) (M47 816)   91  Thickened endometrium (793 5) (R93 8)   92  Umbilical hernia (941 2) (K42 9)   93  Urinary frequency (788 41) (R35 0)   94  Ventral hernia (553 20) (K43 9)   95  Visit for pre-operative examination (V72 84) (Z01 818)   96  Vitamin B12 deficiency (266 2) (E53 8)   97  Vitamin D deficiency (268 9) (E55 9)   98  Vitreo-retinal adhesions (379 29) (H43 89)    Past Medical History    1  History of Acute embolism and thrombosis of unspecified deep veins of unspecified lower extremity (453 40) (I82 409)   2  Adenomatous polyp of colon (211 3) (D12 6)   3  History of Anosmia (781 1) (R43 0)   4  History of Anxiety (300 00) (F41 9)   5  History of Arm numbness (782 0) (R20 0)   6  History of Atypical chest pain (786 59) (R07 89)   7  History of Bilateral arm weakness (729 89) (R29 898)   8  History of Breast pain (611 71) (N64 4)   9  History of Breast tenderness (611 71) (N64 4)   10  History of Coronary Artery Disease (V12 59)   11  History of H/O colonoscopy (V45 89) (Z98 890)   12  History of angina pectoris (V12 59) (Z86 79)   13  History of arthritis (V13 4) (Z87 39)   14  History of blurred vision (V12 49) (Z86 69)   15  History of bronchitis (V12 69) (Z87 09)   16  History of cataract (V12 49) (Z86 69)   17  History of chest pain (V13 89) (Z87 898)   18  History of depression (V11 8) (Z86 59)   19  History of diabetes mellitus (V12 29) (Z86 39)   20   History of DVT (deep vein thrombosis) (V12 51) (Z86 718)   21  History of fibromyalgia (V13 59) (Z87 39)   22  History of gastric ulcer (V12 79) (Z87 19)   23  History of hypercholesterolemia (V12 29) (Z86 39)   24  History of hypertension (V12 59) (Z86 79)   25  History of mammogram (V15 89) (Z92 89)   26  History of psoriasis (V13 3) (Z87 2)   27  History of seasonal allergies (V15 09) (Z88 9)   28  History of skin disorder (V13 3) (Z87 2)   29  History of sleep disturbance (V13 89) (Z87 898)   30  History of thyroid disease (V12 29) (Z86 39)   31  History of visual disturbance (V12 49) (Z86 69)   32  History of Imbalance (781 2) (R26 89)   33  History of Joint pain (719 40) (M25 50)   34  History of Leg weakness, bilateral (729 89) (R29 898)   35  History of Loss of smell (781 1) (R43 0)   36  History of Lumbar radiculopathy (724 4) (M54 16)   37  History of Nasal drainage (478 19) (J34 89)   38  History of Neck stiffness (723 5) (M43 6)   39  History of Need for prophylactic vaccination and inoculation against influenza (V04 81)  (Z23)   40  History of Palpitations (785 1) (R00 2)   41  History of Sinus pain (478 19) (J34 89)   42  History of Ventral hernia (553 20) (K43 9)   43  History of Wears eyeglasses (V49 89) (Z97 3)    Surgical History    1  History of Back Surgery   2  History of Cataract Surgery   3  History of Cholecystectomy   4  History of Coronary Artery Surgery   5  History of Diagnostic Cystoscopy   6  History of Gallbladder Surgery   7  History of Neuroplasty Median Nerve At Carpal Tunnel   8  History of Tonsillectomy With Adenoidectomy   9  History of Total Abdominal Hysterectomy With Bilateral Salpingo-Oophorectomy   10  History of Umbilical Hernia Repair    Family History  Mother    1  Family history of Anxiety   2  Family history of arthritis (V17 7) (Z82 61)   3  Family history of leukemia (V16 6) (Z80 6)   4  Family history of Major depressive disorder, recurrent episode with atypical features  Father    5   Family history of Coronary Artery Disease (V17 49)   6  Family history of Diabetes Mellitus (V18 0)   7  Family history of infectious disease (V18 8) (Z83 1)   8  Family history of Heart problem  Daughter    5  Family history of Anxiety   10  Family history of Major depressive disorder, recurrent episode with atypical features  Grandparent    11  Family history of alcohol abuse (V61 41) (Z81 1)  Maternal Grandmother    15  Family history of Alzheimer's disease (V17 2) (Z82 0)  Maternal Grandfather    15  Family history of Heart problem  Family History    14  Family history of Cancer   15  Family history of Diabetes Mellitus (V18 0)   16  Family history of Hypertension (V17 49)   17  Family history of Reported Prior Back Trouble   25  Family history of Thyroid Disorder (V18 19)    Social History     · Denied: Alcohol Use (History)   · Denied: History of Currently sexually active   · Denied: History of Drug Use   · Denied: Exercising Regularly   · Former smoker (V15 82) (Q34 381)   · High school or GED   · Lives independently with spouse   ·    · No alcohol use   · No drug use   · No known risk factors   · No known STD risk factors   · Retired   · Two children    Current Meds   1  Acebutolol HCl - 200 MG Oral Capsule; take 1 capsule daily; Therapy: 71MZB5335 to Recorded   2  ALPRAZolam 0 5 MG Oral Tablet; TAKE 1 TABLET 3 times daily PRN; Therapy: 68MKI0447 to (Evaluate:76Kcy5247)  Requested for: 44VBF9130; Last Rx:27Nov2017 Ordered   3  AmLODIPine Besylate 2 5 MG Oral Tablet; take 2 tablet daily; Therapy: (Recorded:29Vyo0648) to Recorded   4  Aspirin 81 MG TABS; Take 1 tablet daily; Therapy: (Recorded:70Aad3840) to Recorded   5  BD Pen Needle [place] U/F 32G X 4 MM Miscellaneous; use as directed; Therapy: 36HPB9523 to (Evaluate:52Voa1303)  Requested for: 20Apr2015 Recorded   6  CoQ-10 100 MG Oral Capsule; TAKE 2 CAPSULE Twice daily; Therapy: ([place]) to Recorded   7   Cymbalta 60 MG Oral Capsule Delayed Release Particles; TAKE 1 CAPSULE DAILY; Therapy: 70KHE9224 to (Evaluate:10Mar2016) Recorded   8  Enalapril Maleate 10 MG Oral Tablet; Take 1 tablet daily; Therapy: 34LSR7911 to  Requested for: 25OAD1896 Recorded   9  Lantus SoloStar 100 UNIT/ML Subcutaneous Solution Pen-injector; INJECT 40 UNIT Every AM and PM; Therapy: 18Bik6136 to (Evaluate:98Ymf7880)  Requested for: 67GQV1637 Recorded   10  Lipitor 40 MG Oral Tablet; TAKE 1 TABLET DAILY; Therapy: 08XUO2083 to (Evaluate:18Oct2015) Recorded   11  MetFORMIN HCl  MG Oral Tablet Extended Release 24 Hour; TAKE 1 TABLET  TWICE DAILY -TAKE WITH A MEAL OR FOOD; Therapy: 71TRT5341 to ([facility])  Requested for: 66RJU8098; Last  Rx:30Kwj2343 Ordered   12  NovoLOG 100 UNIT/ML Subcutaneous Solution; INJECT 4 UNITS TO 14 UNITS  SUBCUTANEOUSLY THREE TIMES DAILY IN THE MORNING, WITH LUNCH, AND WITH  DINNER (DIABETES MELLITUS) (ROTATE SITES); Therapy: 01FVF5396 to (Evaluate:55Yug1275)  Requested for: 21Apr2015 Recorded   13  ProAir  (90 Base) MCG/ACT Inhalation Aerosol Solution; INHALE 2 PUFFS  EVERY 4-6 HOURS AS NEEDED; Therapy: 05SOV9799 to (ZEZSIKXC:31GDX0215)  Requested for: 58Kke4112; Last  Rx:97Bvk0190 Ordered   14  Synthroid 50 MCG Oral Tablet; TAKE 1 TABLET DAILY AS DIRECTED; Therapy: 54FHQ8183 to (Evaluate:59Pwp6641)  Requested for: 03MOV7290 Recorded   15  TiZANidine HCl - 2 MG Oral Capsule; 1 or 2 TABLETS AT BEDTIME; Therapy: 22Fbw4712 to Recorded   16  Vitamin D3 2000 UNIT Oral Capsule; TAKE 1 CAPSULE ONCE DAILY; Therapy: (Recorded:29Dja6488) to Recorded    Allergies  1  No Known Drug Allergies  2   Seasonal    Vitals  Vital Signs    Recorded: 11Dec2017 09:18AM   Temperature 98 7 F   Heart Rate 68   Respiration 16   Systolic 545   Diastolic 56   Height 5 ft 1 in   Weight 205 lb 6 oz   BMI Calculated 38 81   BSA Calculated 1 91   Pain Scale 5       Physical Exam   (full strength bilateral LE, negative SLR, intact sensation and DTR, diminished pedal pulse with dry/pale skin bilateral LE, symmetrically depressed DTR, normal gait with paravertebral spasm)  Constitutional Patient appears healthy and well developed  No signs of acute distress present  Eyes Vision is grossly normal  Discs flat with sharp margins  Respiratory Auscultation of lungs: Clear to auscultation bilaterally  Cardiovascular Auscultation of heart: Rate is regular  Rhythm is regular  No heart murmur appreciated  Carotid pulses: 2+ and equal bilaterally, without bruits  -- Peripheral vascular exam: Pedal Pulses: 2+ and equal bilaterally  Radial pulses: 2+ and equal bilaterally  -- Extremities: No edema of the lower limbs bilaterally  Abdomen The abdomen is flat  No abdominal scars  Abdomen is soft, nontender, and nondistended  -- Anus, perineum, and rectum: Exam deferred  Neurologic - Mental Status: Alert and Oriented x3  -- Mood and affect: Affect is normal  -- Memory is grossly intact  -- Attention is WNL  -- Speech is articulated and fluent  -- Knowledge and vocabulary consistent with education  Cranial Nerve Exam:  2nd cranial nerve: Visual field was full to confrontation  -- 3rd, 4th, and 6th cranial nerves: Normal with no deficit  -- 5th cranial nerve: Sensation was intact in all three divisions to light touch and temperature  Motor function was intact  -- 7th cranial nerve: Face symmetrical at grimace and at rest -- 8th cranial nerve: Grossly intact to finger rub bilaterally  -- 9th and 10th cranial nerves: Uvula is midline  -- 11th cranial nerve: Shoulder shrug equal bilaterally  -- 12th cranial nerve: Tongue mideline, no atrophy present  Motor System General Motor Strength: No pronator drift and no parietal drift  Motor System - Upper Extremities: Normal to inspection and palpation  Strength: Deltoids 5/5 bilaterally  Biceps 5/5 bilaterally  Triceps 5/5 bilaterally  Extensor carpi radials is 5/5 bilaterally  Extensor digitorum 5/5 bilaterally   Intrinsic 5/5 bilaterally   5/5 bilaterally  -- Muscle tone: Normal bilaterally  -- Muscle Bulk: Normal bilaterally  Motor System - Lower Extremities: Normal to inspection and palpation  Strength: Iliopsoas 5/5 bilaterally  Quadriceps 5/5 bilaterally  Hamstrings 5/5 bilaterally  Gastrocnemius 5/5 bilaterally  -- Muscle tone: Normal bilaterally  Reflexes: Biceps reflexes are 2+ bilaterally  Triceps reflexes are 2+ bilaterally  Achilles reflexes are 2+ bilaterally  Babinski's reflex is 2+ down going bilaterally  Ankle clonus is absent bilaterally  Coordination: Finger to nose was normal   Sensory: Sensation grossly intact to light touch  Sensation grossly intact to light touch  Gait and Station: [place] with a normal gait  Results/Data   MRI Review mild to moderate multilevel lumbar disc degeneration with associated spinal stenosis worse at L4/5        Future Appointments    Date/Time Provider Specialty Site   04/03/2018 01:30 PM Ysabel Marks DO Internal [facility]       Signatures   Electronically signed by : ERMA Edgar ; Dec 11 2017  9:54AM EST                       (Author)

## 2017-12-28 ENCOUNTER — GENERIC CONVERSION - ENCOUNTER (OUTPATIENT)
Dept: INTERNAL MEDICINE CLINIC | Facility: CLINIC | Age: 72
End: 2017-12-28

## 2018-01-09 ENCOUNTER — ALLSCRIPTS OFFICE VISIT (OUTPATIENT)
Dept: OTHER | Facility: OTHER | Age: 73
End: 2018-01-09

## 2018-01-09 DIAGNOSIS — E11.42 TYPE 2 DIABETES MELLITUS WITH DIABETIC POLYNEUROPATHY (HCC): ICD-10-CM

## 2018-01-09 NOTE — PSYCH
Progress Note  Psychotherapy Provided [employer]:   Goals addressed in session:   (G# 1 ) King Lizabeth attended the Caregiver Support Group today  With the expectation of providing support via a support group, discussed who she identifies as her support system  She identified her her daughter as a support system: "just to say the words   get it off your chest " "I take any opportunity I can get to get out " She states she resents how her mother treats her to include, for example, Yajaira's contention of her mother wanting to know where she is going, what time she will be back and "what took you so long?" She states feels as though she is the child and that her mother does not treat her with consideration or respect  A: Eye contact was consistent with the participants and with the group facilitator and she verbally responded to participants' input as well as to the facilitator's input without prompting  She offered support to one of the participants during the session  She smiled during the session and presented as comfortable expressing her feelings  P: G#1) will continue with group participation to help her deal with her feelings and her relationship with her mother;   Pain Scale and Suicide Risk [facility]: On a scale of 0 to 10, the patient rates current pain at 0   Current suicide risk is low   Behavioral Health Treatment Plan [employer]: Diagnosis and Treatment Plan explained to patient, patient relates understanding diagnosis and is agreeable to Treatment Plan  Assessment    1  Anxiety (300 00) (F41 9)   2   Depression (311) (F32 9)    Signatures   Electronically signed by : Petrona Roldan, [email or web address],JIGAR; Aug 18 2016  6:15PM EST                       (Author)

## 2018-01-09 NOTE — CONSULTS
Reason For Visit  Your here to follow up in monitor your kidney function        History of Present Illness  Patient is here for follow up in monitor her kidney function  Since she has last been seen she went to the emergency room in the spring for some shortness of breath and when they did a CT scan to look for clots she actually was found to have an enlarged uterus  She underwent hysterectomy which revealed some aggressive benign pathology  She also was found a little nodule in her lung which is being monitored other than that her shortness of breath has resolved and she feels well with no other issues  Review of Systems    Constitutional: no fever, no chills, no anorexia and no fatigue  Gastrointestinal: no abdominal pain, no nausea, no diarrhea and no vomiting  Respiratory: no shortness of breath, no cough and not coughing up sputum  Cardiovascular: no orthopnea, no chest pain, no palpitations and no lower extremity edema  Neurological: No complaints of headache, no lightheadedness or dizziness  Genitourinary: no dysuria, no hematuria and no incomplete emptying of bladder  Eyes: No complaints of eyesight problems or dryness of eyes  Current Meds   1  Acebutolol HCl - 200 MG Oral Capsule; take 1 capsule daily; Therapy: 21OKH6282 to Recorded   2  ALPRAZolam 0 5 MG Oral Tablet; TAKE 1 TABLET 3 times daily PRN; Therapy: 82RUT3480 to (Kasey Rocha)  Requested for: 77AVB0334; Last   Rx:62Jvt3401 Ordered   3  AmLODIPine Besylate 2 5 MG Oral Tablet; take 2 tablet daily; Therapy: (Recorded:43Mka9997) to Recorded   4  Aspirin 81 MG TABS; Take 1 tablet daily; Therapy: (Recorded:21Apr2015) to Recorded   5  BD Pen Needle Clover U/F 32G X 4 MM Miscellaneous; use as directed; Therapy: 62QNI2390 to (Evaluate:06Bbs5122)  Requested for: 35Ctp1660 Recorded   6  CoQ-10 100 MG Oral Capsule; TAKE 2 CAPSULE Twice daily; Therapy: (Recorded:30Nov2015) to Recorded   7   Cymbalta 60 MG Oral Capsule Delayed Release Particles; TAKE 1 CAPSULE DAILY; Therapy: 07LMZ5304 to (Evaluate:10Mar2016) Recorded   8  Enalapril Maleate 10 MG Oral Tablet; Take 1 tablet daily; Therapy: 73JAM1568 to  Requested for: 92EDR1021 Recorded   9  Enoxaparin Sodium 40 MG/0 4ML Subcutaneous Solution; Administer 40mg SQ daily for   28 days after surgery; Therapy: 43BKB9227 to (Evaluate:07Xpv1169); Last Rx:55Jtn6199 Ordered   10  Lantus SoloStar 100 UNIT/ML Subcutaneous Solution Pen-injector; INJECT 40 UNIT    Every AM and PM;    Therapy: 20Mar2012 to (Evaluate:89Xis7735)  Requested for: 94GBH7268 Recorded   11  Lipitor 40 MG Oral Tablet; TAKE 1 TABLET DAILY; Therapy: 84HMT3637 to (Evaluate:18Oct2015) Recorded   12  MetFORMIN HCl  MG Oral Tablet Extended Release 24 Hour; TAKE 1 TABLET    TWICE DAILY -TAKE WITH A MEAL OR FOOD; Therapy: 57MKK2780 to ([facility])  Requested for: 83IFT2650; Last    Rx:08Ucd5692 Ordered   13  NovoLOG 100 UNIT/ML Subcutaneous Solution; INJECT 4 UNITS TO 14 UNITS    SUBCUTANEOUSLY THREE TIMES DAILY IN THE MORNING, WITH LUNCH, AND WITH    DINNER ([facility]ABETES MELLITUS) [facility] Therapy: 36PBV5149 to (Evaluate:42Zzf1605)  Requested for: 65Xpz4561 Recorded   14  ProAir  (90 Base) MCG/ACT Inhalation Aerosol Solution; INHALE 2 PUFFS    EVERY 4-6 HOURS AS NEEDED; Therapy: 83KDG6588 to (SVFAYVSE:45RSH3924)  Requested for: 25Rof9163; Last    Rx:93Pra7736 Ordered   15  Synthroid 50 MCG Oral Tablet; TAKE 1 TABLET DAILY AS DIRECTED; Therapy: 55MQK3680 to (Evaluate:76Tbl8807)  Requested for: 41OTM8303 Recorded   16  TiZANidine HCl - 2 MG Oral Capsule; 1 or 2 TABLETS AT BEDTIME; Therapy: 18Tkx9886 to Recorded   17  Vitamin D3 2000 UNIT Oral Capsule; TAKE 1 CAPSULE ONCE DAILY; Therapy: (Recorded:85Wxb1457) to Recorded    The medication list was reviewed and updated today  Allergies    1  No Known Drug Allergies    2   Seasonal    Vitals   Recorded: 13GUQ0429 10: 44AM Recorded: 09Cmv2074 10:21AM   Heart Rate 70    Respiration 16    Systolic 822    Diastolic 60    Height  5 ft 1 in   Weight  207 lb    BMI Calculated  39 11   BSA Calculated  1 92     Physical Exam    Constitutional: General appearance: No acute distress, well appearing and well nourished  ENT: External ears and nose appear normal      Eyes: Anicteric sclerae  JVD:  No JVD present  Pulmonary: Respiratory effort: No increased work of breathing or signs of respiratory distress  Auscultation of lungs: Clear to auscultation  Cardiovascular: Auscultation of heart: Normal rate and rhythm, normal S1 and S2, without murmurs  Abdomen: Non-tender, no masses  Extremities: No cyanosis, clubbing or edema  Neurologic: Non Focal      Psychiatric: Orientation to person, place, and time: Normal        Results/Data  (1) BASIC METABOLIC PROFILE 90PNF7461 11:36AM José Cohen Order Number: [id number]     Test Name Result Flag Reference   GLUCOSE,RANDM 256 mg/dL H    If the patient is fasting, the ADA then defines impaired fasting glucose as > 100 mg/dL and diabetes as > or equal to 123 mg/dL  Specimen collection should occur prior to Sulfasalazine administration due to the potential for falsely depressed results  Specimen collection should occur prior to Sulfapyridine administration due to the potential for falsely elevated results     SODIUM 137 mmol/L  136-145   POTASSIUM 4 6 mmol/L  3 5-5 3   CHLORIDE 103 mmol/L  100-108   CARBON DIOXIDE 23 mmol/L  21-32   ANION GAP (CALC) 11 mmol/L  4-13   BLOOD UREA NITROGEN 21 mg/dL  5-25   CREATININE 1 35 mg/dL H 0 60-1 30   Standardized to IDMS reference method   CALCIUM 9 1 mg/dL  8 3-10 1   eGFR 40 ml/min/1 73sq [facility] [employer] Disease Education Program recommendations are as follows:  GFR calculation is accurate only with a steady state creatinine  Chronic Kidney disease less than 60 ml/min/1 73 sq  meters  Kidney failure less than 15 ml/min/1 73 sq  meters  (1) MICROALBUMIN CREATININE RATIO, RANDOM URINE 23Oct2017 11:36AM Cesar Senior Order Number: [id number]     Test Name Result Flag Reference   MICROALBUMIN/ CREAT R 44 mg/g creatinine H 0-30   MICROALBUMIN,URINE 58 7 mg/L H 0 0-20 0   CREATININE URINE 134 0 mg/dL         Assessment    1  Chronic renal insufficiency (585 9) (N18 9)   2  Microalbuminuria (791 0) (R80 9)    Plan  Chronic renal insufficiency    · (1) BASIC METABOLIC PROFILE; Status:Active; Requested HHH:17MYE5345; Perform:[facility]; PRW:65MDX4358;XWFXFGS; For:Chronic renal insufficiency; Ordered By:Basilio Arreola;   · (1) MICROALBUMIN CREATININE RATIO, RANDOM URINE; Status:Active; Requested  BZO:46BKU7793; Perform:[facility]; NJK:20LMN4608;BSIKXQR; For:Chronic renal insufficiency; Ordered By:Miranda Arreola;     as we discussed your creatinine which is the but test your kidney function is around 1 2 and that slightly abnormal but I do not think this is diabetic kidney disease because you do not have any significant protein in the urine and as we discussed that usually is in significant quantities before the kidneys begin to decline of function  Try to get your blood sugars under little bit better control but your blood pressure is excellent continue other current medications  Discussion/Summary    The patient is here for follow-up of chronic renal insufficiency and this is likely due to nephrosclerosis and creatinine is relatively stable at baseline without progression  This is not due to diabetic kidney disease as she still has very low levels of protein in the microalbumin stage  You do not see decline in renal function during this period  I stressed the importance of improving her glycemic control to target A1c under 7%  Her blood pressure is under good control and she is on medication for her lipids as well  Will continue to follow  No changes          Health Management   COLONOSCOPY; every 5 years; Last 05SEC3606; Next Due: 84PBA8119; Active   Digital Bilateral Screening Mammogram With CAD; every 1 year; Next Due: 82STC1557; Overdue   Medicare Annual Wellness Visit; every 1 year; Next Due: 25YUC1615; Overdue    Future Appointments    Signatures   Electronically signed by :  Charolett Frankel, M D ; Oct 24 2017 10:46AM EST                       (Author)

## 2018-01-09 NOTE — PSYCH
Message  Message Free Text Note Form: was informed today via SMART that she cancelled her [facility] session for 10/19/16 @ 1 PM;      Active Problems    1  Abdominal pain (789 00) (R10 9)   2  Abdominal pain, epigastric (789 06) (R10 13)   3  Abnormal blood chemistry (790 6) (R79 9)   4  Abrasion (919 0) (T14 8)   5  Acute deep vein thrombosis of lower extremity (453 40) (I82 409)   6  Acute upper respiratory infection (465 9) (J06 9)   7  Adenomatous polyp of colon (211 3) (D12 6)   8  Anemia (285 9) (D64 9)   9  Anosmia (781 1) (R43 0)   10  Antral ulcer (531 90) (K25 9)   11  Anxiety (300 00) (F41 9)   12  Asthma (493 90) (J45 909)   13  Atypical chest pain (786 59) (R07 89)   14  Balance problems (781 99) (R26 89)   15  Benign essential hypertension (401 1) (I10)   16  Bezoars (938)   17  Bronchitis, asthmatic (493 90) (J45 909)   18  Cervical disc disease (722 91) (M50 90)   19  Cervical disc herniation (722 0) (M50 20)   20  Cervical radiculopathy (723 4) (M54 12)   21  Cervical radiculopathy (723 4) (M54 12)   22  Cervical spinal stenosis (723 0) (M48 02)   23  Cervical spinal stenosis (723 0) (M48 02)   24  Cervical spinal stenosis (723 0) (M48 02)   25  Cervical strain (847 0) (S16 1XXA)   26  Chronic mastoiditis (383 1) (H70 10)   27  Chronic renal insufficiency (585 9) (N18 9)   28  Colon cancer screening (V76 51) (Z12 11)   29  Contusion of skin with intact surface (924 9) (T14 8)   30  Depression (311) (F32 9)   31  Diabetic polyneuropathy associated with type 2 diabetes mellitus (250 60,357 2)    (E11 42)   32  Diarrhea (787 91) (R19 7)   33  Disc degeneration, lumbar (722 52) (M51 36)   34  Dizziness (780 4) (R42)   35  DM2 (diabetes mellitus, type 2) (250 00) (E11 9)   36  Dyspepsia (536 8) (K30)   37  Elevated C-reactive protein (790 95) (R79 82)   38  Encounter for screening for osteoporosis (V82 81) (Z13 820)   39  Encounter for screening mammogram for breast cancer (V76 12) (Z12 31)   40  Falls (E888 9) (W19 XXXA)   41  Fatigue (780 79) (R53 83)   42  Flushing (782 62) (R23 2)   43  Headache (784 0) (R51)   44  History of allergy (V15 09) (Z88 9)   45  Hyperlipidemia (272 4) (E78 5)   46  Hypomagnesemia (275 2) (E83 42)   47  Influenza vaccination administered during current admission (V04 81) (Z23)   48  Initial Medicare annual wellness visit (V70 0) (Z00 00)   49  Low vitamin B12 level (266 2) (E53 8)   50  Lumbar radiculopathy (724 4) (M54 16)   51  Macular Retinal Edema Of Both Eyes (362 83)   52  Microalbuminuria (791 0) (R80 9)   53  Multiple joint pain (719 49) (M25 50)   54  Myofascial pain syndrome (729 1) (M79 1)   55  Neck pain (723 1) (M54 2)   56  Need for pneumococcal vaccination (V03 82) (Z23)   57  Need for prophylactic vaccination and inoculation against influenza (V04 81) (Z23)   58  Pain in wrist, unspecified laterality (719 43) (M25 539)   59  Palpitations (785 1) (R00 2)   60  Panic attack (300 01) (F41 0)   61  Post-nasal drip (784 91) (R09 82)   62  Pounding heartbeat (785 1) (R00 2)   63  Preoperative clearance (V72 84) (Z01 818)   64  Primary hypothyroidism (244 9) (E03 9)   65  Psoriatic arthropathy (696 0) (L40 50)   66  Right knee pain (719 46) (M25 561)   67  Screening for genitourinary condition (V81 6) (Z13 89)   68  Screening for neurological condition (V80 09) (Z13 89)   69  Sinusitis (473 9) (J32 9)   70  Spinal stenosis (724 00) (M48 00)   71  Spondylosis of cervical region without myelopathy or radiculopathy (721 0) (M47 812)   72  Spondylosis of lumbar region without myelopathy or radiculopathy (721 3) (M47 816)   73  Umbilical hernia (457 4) (K42 9)   74  Urinary frequency (788 41) (R35 0)   75  Ventral hernia (553 20) (K43 9)   76  Visit for pre-operative examination (V72 84) (Z01 818)   77  Vitamin D deficiency (268 9) (E55 9)   78  Vitreo-retinal adhesions (379 29) (H43 89)    Current Meds   1   ALPRAZolam 0 5 MG Oral Tablet; TAKE 1 TABLET 3 times daily PRN; Therapy: 82HSE9500 to (Evaluate:02Jan2017)  Requested for: 48QDT3066; Last   Rx:04Oct2016 Ordered   2  AmLODIPine Besylate 2 5 MG Oral Tablet; take 2 tablet daily; Therapy: (Recorded:29Mxh5558) to Recorded   3  Aspirin 81 MG TABS; Take 1 tablet daily; Therapy: (Recorded:14Djx1183) to Recorded   4  Atenolol 25 MG Oral Tablet; take 0 5 tablet daily; Therapy: (Recorded:20Apr2015) to Recorded   5  BD Pen Needle Clover U/F 32G X 4 MM Miscellaneous; use as directed; Therapy: 36YKU6554 to (Evaluate:29Ohq6988)  Requested for: 20Apr2015 Recorded   6  CoQ-10 100 MG Oral Capsule; TAKE 2 CAPSULE Twice daily; Therapy: (Recorded:30Nov2015) to Recorded   7  Cymbalta 60 MG Oral Capsule Delayed Release Particles (DULoxetine HCl); TAKE 1   CAPSULE DAILY; Therapy: 56WXM4438 to (Evaluate:10Mar2016) Recorded   8  Enalapril Maleate 10 MG Oral Tablet; TAKE TABLET  TAKE 10 MG IN AM, 10 MG IN PM;   Therapy: 35GGO4981 to  Requested for: 38NGD2037 Recorded   9  Lantus SoloStar 100 UNIT/ML Subcutaneous Solution Pen-injector; INJECT 40 UNIT   Every AM and PM;   Therapy: 20Mar2012 to (Evaluate:41Sjb2986)  Requested for: 88YPP5844 Recorded   10  Lipitor 40 MG Oral Tablet (Atorvastatin Calcium); TAKE 1 TABLET DAILY; Therapy: 98PWS2198 to (Evaluate:18Oct2015) Recorded   11  MetFORMIN HCl  MG Oral Tablet Extended Release 24 Hour; TAKE 1 TABLET    TWICE DAILY -TAKE WITH A MEAL OR FOOD; Therapy: 56BQK9929 to (Evaluate:71Qbs5952)  Requested for: 27CKX7935; Last    Rx:04Oct2016 Ordered   12  NovoLOG 100 UNIT/ML Subcutaneous Solution; INJECT 4 UNITS TO 14 UNITS    SUBCUTANEOUSLY THREE TIMES DAILY IN THE MORNING, WITH LUNCH, AND WITH    DINNER (DIABETES MELLITUS) (ROTATE SITES); Therapy: 50ZQS3029 to (Evaluate:49Efh1747)  Requested for: 21Apr2015 Recorded   13  ProAir  (90 Base) MCG/ACT Inhalation Aerosol Solution; INHALE 2 PUFFS    EVERY 4-6 HOURS AS NEEDED;     Therapy: 87YEP9841 to (MBNQPQXZ:40FZG5559)  Requested for: 26MMY6782; Last    Rx:00Gkl4045 Ordered   14  Synthroid 50 MCG Oral Tablet (Levothyroxine Sodium); TAKE 1 TABLET DAILY AS    DIRECTED; Therapy: 73OXU8360 to (Evaluate:18Nvc4506)  Requested for: 38SJD8417 Recorded   15  TraMADol HCl - 50 MG Oral Tablet; TAKE TABLET  ONE DAILY AS NEEDED FOR PAIN;    Therapy: (Recorded:30Nov2015) to Recorded   16  Vitamin D3 2000 UNIT Oral Capsule; TAKE 1 CAPSULE ONCE DAILY; Therapy: (Recorded:21Apr2015) to Recorded   17  Voltaren 1 % Transdermal Gel (Diclofenac Sodium); APPLY TO LOWER EXTREMITIES, 4    GM OF GEL TO AFFECTED AREA 4 TIMES DAILY  DO NOT APPLY MORE    THAN 16 GM DAILY TO ANY ONE AFFECTED JOINT; Therapy: 92CGY0785 to (Evaluate:30Jan2015)  Requested for: 16Sep2014; Last    Rx:96Few8131 Ordered    Allergies    1  No Known Drug Allergies    2   Seasonal    Signatures   Electronically signed by : Marjan Owens, MSWLCSWMSOFE,JIGAR; Oct 18 2016  6:32PM EST                       (Author)

## 2018-01-10 NOTE — PROGRESS NOTES
Assessment   1  Diabetic polyneuropathy associated with type 2 diabetes mellitus (250 60,357 2)     (E11 42)    Plan   Diabetic polyneuropathy associated with type 2 diabetes mellitus    · VAS DARVIN & WAVEFORM ANALYSIS, MULTIPLE LEVELS; Status:Active; Requested    TCL:17GQM5348; Perform:[facility]; NML:29EPK2600; Last Updated By:Christine Garcia; 1/9/2018 2:44:47 PM;Ordered; For:Diabetic polyneuropathy associated with type 2 diabetes mellitus; Ordered By:Tobias Jacobs;    Discussion/Summary   Discussion Summary:    Bilateral leg painâunclear if this is true atherosclerotic/arterial claudication symptoms started in the lower back and go down into the legs  They do worsen with ambulation and is severely limited in her ambulation from that  However clinically she has 2+ palpable dorsalis pedis pulses bilaterally  I will obtain a baseline ankle-brachial index with waveforms and she has long-standing diabetes and neuropathy as well  However I do not believe that she has any significant arterial occlusive disease based on the physical exam  I believe the symptoms are related to neurogenic claudication  Chief Complaint   Chief Complaint Free Text Note Form: I am here because of the pain in my legs  History of Present Illness   HPI: Patient is new to the practice and referred by Dr Maria Victoria Sanchez  Patient had MRI lumber spine on 11/30  Patient has bilat leg pain that she has had 7+ years  Patient is a diabetic  She has achiness in both thighs and calfs after walking 30 feet  After about 3 minutes of rest the pain goes away and she is able to walk again  She admits to prior tobacco use x 3 years  She denies open areas or sores on her legs  She takes ASA daily  Review of Systems   Complete Female - Vasc:      Constitutional: feeling tired, but-- no fever,-- loss in appetite,-- no recent weight gain,-- no chills-- and-- no recent weight loss        Eyes: eyesight problems,-- dryness of the eyes,-- no sudden vision loss,-- no blurred vision-- and-- no double vision, but-- no eye pain,-- eyes not red,-- no purulent discharge from the eyes,-- no itching of the eyes-- and-- wears glasses  ENT: nasal discharge-- and-- hoarseness, but-- no earache,-- no nosebleeds,-- no sore throat-- and-- no hearing loss  Cardiovascular: no painful veins,-- palpitations,-- no leg pain with walking-- and-- no bleeding veins, but-- regular heart rate,-- no chest pain-- and-- no intermittent leg claudication  Respiratory: no wheezing,-- no cough-- and-- no complaints of PND, but-- shortness of breath,-- shortness of breath during exertion-- and-- orthopnea  Gastrointestinal: No nausea, No vomiting, no diarrhea, no blood in stool  Genitourinary: no dysuria, no Hematuria,no urinary incontinence  Musculoskeletal: myalgias-- and-- joint stiffness, but-- lumbar pain,-- no joint swelling-- and-- no limb swelling  Integumentary: itching,-- dry skin-- and-- no ulcers, but-- no rashes,-- no skin lesions-- and-- no skin wound  Neurological: numbness,-- no dementia,-- limb weakness-- and-- difficulty walking, but-- no headache,-- no confusion,-- no dizziness,-- no convulsions-- and-- no fainting  Psychiatric: depression,-- anxiety-- and-- no mood disorder  Hematologic/Lymphatic: no bleeding disorder, no easy bruising  ROS Reviewed:    ROS reviewed  Active Problems   1  Abdominal pain (789 00) (R10 9)   2  Abdominal pain, epigastric (789 06) (R10 13)   3  Abnormal blood chemistry (790 6) (R79 9)   4  Abrasion (919 0) (T14 8XXA)   5  Abrasion of arm, left (913 0) (S40 812A)   6  History of Acute embolism and thrombosis of unspecified deep veins of unspecified     lower extremity (453 40) (I82 409)   7  Acute upper respiratory infection (465 9) (J06 9)   8  Adenomatous polyp of colon (211 3) (D12 6)   9  Anemia (285 9) (D64 9)   10  Anosmia (781 1) (R43 0)   11   Antral ulcer (531 90) (K25 9)   12  Anxiety (300 00) (F41 9)   13  Asthma (493 90) (J45 909)   14  Atypical chest pain (786 59) (R07 89)   15  Back pain (724 5) (M54 9)   16  Balance problems (781 99) (R26 89)   17  Benign essential hypertension (401 1) (I10)   18  Bezoars (938)   19  Bronchitis, asthmatic (493 90) (J45 909)   20  Cervical disc disease (722 91) (M50 90)   21  Cervical disc herniation (722 0) (M50 20)   22  Cervical radiculopathy (723 4) (M54 12)   23  Cervical radiculopathy (723 4) (M54 12)   24  Cervical spinal stenosis (723 0) (M48 02)   25  Cervical spinal stenosis (723 0) (M48 02)   26  Cervical spinal stenosis (723 0) (M48 02)   27  Cervical strain (847 0) (S16 1XXA)   28  Chronic mastoiditis (383 1) (H70 10)   29  Chronic renal insufficiency (585 9) (N18 9)   30  Colon cancer screening (V76 51) (Z12 11)   31  Colon polyps (211 3) (K63 5)   32  Complex endometrial hyperplasia (621 32) (N85 01)   33  Contusion of skin with intact surface (924 9) (T14 8XXA)   34  Depression (311) (F32 9)   35  Depression with anxiety (300 4) (F41 8)   36  Diabetic polyneuropathy associated with type 2 diabetes mellitus (250 60,357 2)      (E11 42)   37  Diarrhea (787 91) (R19 7)   38  Disc degeneration, lumbar (722 52) (M51 36)   39  Dizziness (780 4) (R42)   40  DM2 (diabetes mellitus, type 2) (250 00) (E11 9)   41  DVT (deep venous thrombosis) (453 40) (I82 409)   42  Dyspepsia (536 8) (K30)   43  Early satiety (780 94) (R68 81)   44  Elevated C-reactive protein (790 95) (R79 82)   45  Encounter for screening for osteoporosis (V82 81) (Z13 820)   46  Encounter for screening mammogram for breast cancer (V76 12) (Z12 31)   47  Falls (E888 9) (W19 XXXA)   48  Fatigue (780 79) (R53 83)   49  Flushing (782 62) (R23 2)   50  Headache (784 0) (R51)   51  Heart palpitations (785 1) (R00 2)   52  History of allergy (V15 09) (Z88 9)   53  Hyperlipidemia (272 4) (E78 5)   54  Hypomagnesemia (275 2) (E83 42)   55   Influenza vaccination administered during current admission (V04 81) (Z23)   56  Initial Medicare annual wellness visit (V70 0) (Z00 00)   57  Intercostal pain (786 59) (R07 82)   58  Lack of physical activity (V69 0) (Z72 3)   59  Low vitamin B12 level (266 2) (E53 8)   60  Lumbar radicular pain (724 4) (M54 16)   61  Lumbar radiculopathy (724 4) (M54 16)   62  Lumbar stenosis with neurogenic claudication (724 03) (M48 062)   63  Lung nodule (793 11) (R91 1)   64  Macular Retinal Edema Of Both Eyes (362 83)   65  Medicare annual wellness visit, subsequent (V70 0) (Z00 00)   66  Microalbuminuria (791 0) (R80 9)   67  Multiple joint pain (719 49) (M25 50)   68  Myofascial pain syndrome (729 1) (M79 1)   69  Neck pain (723 1) (M54 2)   70  Need for influenza vaccination (V04 81) (Z23)   71  Need for pneumococcal vaccination (V03 82) (Z23)   72  Need for prophylactic vaccination and inoculation against influenza (V04 81) (Z23)   73  History of Need for prophylactic vaccination and inoculation against influenza (V04 81)      (Z23)   74  Onychomycosis (110 1) (B35 1)   75  Pain in wrist, unspecified laterality (719 43) (M25 539)   76  Palpitations (785 1) (R00 2)   77  Panic attack (300 01) (F41 0)   78  Post-nasal drip (784 91) (R09 82)   79  Postoperative visit (V58 49) (Z48 89)   80  Pounding headache (784 0) (R51)   81  Pounding heartbeat (785 1) (R00 2)   82  Preoperative clearance (V72 84) (Z01 818)   83  Primary hypothyroidism (244 9) (E03 9)   84  Psoriatic arthropathy (696 0) (L40 50)   85  Right knee pain (719 46) (M25 561)   86  Screening for genitourinary condition (V81 6) (Z13 89)   87  Screening for neurological condition (V80 09) (Z13 89)   88  Shortness of breath (786 05) (R06 02)   89  Sinusitis (473 9) (J32 9)   90  Spinal stenosis (724 00) (M48 00)   91  Spondylosis of cervical region without myelopathy or radiculopathy (721 0) (M47 812)   92   Spondylosis of lumbar region without myelopathy or radiculopathy (721 3) (M47 816)   93  Thickened endometrium (793 5) (R93 8)   94  Umbilical hernia (068 3) (K42 9)   95  Urinary frequency (788 41) (R35 0)   96  Vascular claudication (443 9) (I73 9)   97  Ventral hernia (553 20) (K43 9)   98  Visit for pre-operative examination (V72 84) (Z01 818)   99  Vitamin B12 deficiency (266 2) (E53 8)   100  Vitamin D deficiency (268 9) (E55 9)   101  Vitreo-retinal adhesions (379 29) (H43 89)    Past Medical History   1  History of Acute embolism and thrombosis of unspecified deep veins of unspecified     lower extremity (453 40) (I82 409)   2  Adenomatous polyp of colon (211 3) (D12 6)   3  History of Anosmia (781 1) (R43 0)   4  History of Anxiety (300 00) (F41 9)   5  History of Arm numbness (782 0) (R20 0)   6  History of Atypical chest pain (786 59) (R07 89)   7  History of Bilateral arm weakness (729 89) (R29 898)   8  History of Breast pain (611 71) (N64 4)   9  History of Breast tenderness (611 71) (N64 4)   10  History of Coronary Artery Disease (V12 59)   11  History of H/O colonoscopy (V45 89) (Z98 890)   12  History of angina pectoris (V12 59) (Z86 79)   13  History of arthritis (V13 4) (Z87 39)   14  History of blurred vision (V12 49) (Z86 69)   15  History of bronchitis (V12 69) (Z87 09)   16  History of cataract (V12 49) (Z86 69)   17  History of chest pain (V13 89) (Z87 898)   18  History of depression (V11 8) (Z86 59)   19  History of diabetes mellitus (V12 29) (Z86 39)   20  History of DVT (deep vein thrombosis) (V12 51) (Z86 718)   21  History of fibromyalgia (V13 59) (Z87 39)   22  History of gastric ulcer (V12 79) (Z87 19)   23  History of hypercholesterolemia (V12 29) (Z86 39)   24  History of hypertension (V12 59) (Z86 79)   25  History of mammogram (V15 89) (Z92 89)   26  History of psoriasis (V13 3) (Z87 2)   27  History of seasonal allergies (V15 09) (Z88 9)   28  History of skin disorder (V13 3) (Z87 2)   29  History of sleep disturbance (V13 89) (Z87 898)   30   History of thyroid disease (V12 29) (Z86 39)   31  History of visual disturbance (V12 49) (Z86 69)   32  History of Imbalance (781 2) (R26 89)   33  History of Joint pain (719 40) (M25 50)   34  History of Leg weakness, bilateral (729 89) (R29 898)   35  History of Loss of smell (781 1) (R43 0)   36  History of Lumbar radiculopathy (724 4) (M54 16)   37  History of Nasal drainage (478 19) (J34 89)   38  History of Neck stiffness (723 5) (M43 6)   39  History of Need for prophylactic vaccination and inoculation against influenza (V04 81)      (Z23)   40  History of Palpitations (785 1) (R00 2)   41  History of Sinus pain (478 19) (J34 89)   42  History of Ventral hernia (553 20) (K43 9)   43  History of Wears eyeglasses (V49 89) (Z97 3)  Active Problems And Past Medical History Reviewed: The active problems and past medical history were reviewed and updated today  Surgical History   1  History of Back Surgery   2  History of Cataract Surgery   3  History of Cholecystectomy   4  History of Coronary Artery Surgery   5  History of Diagnostic Cystoscopy   6  History of Gallbladder Surgery   7  History of Neuroplasty Median Nerve At Carpal Tunnel   8  History of Tonsillectomy With Adenoidectomy   9  History of Total Abdominal Hysterectomy With Bilateral Salpingo-Oophorectomy   10  History of Umbilical Hernia Repair  Surgical History Reviewed: The surgical history was reviewed and updated today  Family History   Mother    1  Family history of Anxiety   2  Family history of arthritis (V17 7) (Z82 61)   3  Family history of leukemia (V16 6) (Z80 6)   4  Family history of Major depressive disorder, recurrent episode with atypical features  Father    5  Family history of Coronary Artery Disease (V17 49)   6  Family history of Diabetes Mellitus (V18 0)   7  Family history of infectious disease (V18 8) (Z83 1)   8  Family history of Heart problem  Daughter    5  Family history of Anxiety   10   Family history of Major depressive disorder, recurrent episode with atypical features  Grandparent    11  Family history of alcohol abuse (V61 41) (Z81 1)  Maternal Grandmother    15  Family history of Alzheimer's disease (V17 2) (Z82 0)  Maternal Grandfather    15  Family history of Heart problem  Family History    14  Family history of Cancer   15  Family history of Diabetes Mellitus (V18 0)   16  Family history of Hypertension (V17 49)   17  Family history of Reported Prior Back Trouble   25  Family history of Thyroid Disorder (V18 19)  Family History Reviewed: The family history was reviewed and updated today  Social History    · Denied: Alcohol Use (History)   · Denied: History of Currently sexually active   · Denied: History of Drug Use   · Denied: Exercising Regularly   · Former smoker (V15 82) (I56 058)   · High school or GED   · Lives independently with spouse   ·    · No alcohol use   · No drug use   · No known risk factors   · No known STD risk factors   · Retired   · Two children  Social History Reviewed: The social history was reviewed and updated today  Current Meds    1  Acebutolol HCl - 200 MG Oral Capsule; take 1 capsule daily; Therapy: 46THO5354 to Recorded   2  ALPRAZolam 0 5 MG Oral Tablet; TAKE 1 TABLET 3 times daily PRN; Therapy: 95FVC2676 to (Evaluate:10Jtj7488)  Requested for: 64IRZ5973; Last     Rx:27Nov2017 Ordered   3  AmLODIPine Besylate 2 5 MG Oral Tablet; take 2 tablet daily; Therapy: (Recorded:99Qtm3651) to Recorded   4  Aspirin 81 MG TABS; Take 1 tablet daily; Therapy: (Recorded:20Jrc6634) to Recorded   5  BD Pen Needle Clover U/F 32G X 4 MM Miscellaneous; use as directed; Therapy: 70OTS4690 to (Evaluate:66Wki4017)  Requested for: 05Gks2984 Recorded   6  CoQ-10 100 MG Oral Capsule; TAKE 2 CAPSULE Twice daily; Therapy: (Jackie Osei) to Recorded   7  Cymbalta 60 MG Oral Capsule Delayed Release Particles; TAKE 1 CAPSULE DAILY;      Therapy: 62NKV7131 to (Evaluate:10Mar2016) Recorded   8  Enalapril Maleate 10 MG Oral Tablet; Take 1 tablet daily; Therapy: 67YFO4781 to  Requested for: 42ZEW8367 Recorded   9  Lantus SoloStar 100 UNIT/ML Subcutaneous Solution Pen-injector; INJECT 40 UNIT     Every AM and PM;     Therapy: 20Mar2012 to (Evaluate:74Dmr2981)  Requested for: 01ZDX4182 Recorded   10  Lipitor 40 MG Oral Tablet; TAKE 1 TABLET DAILY; Therapy: 87OZA3480 to (Evaluate:18Oct2015) Recorded   11  MetFORMIN HCl  MG Oral Tablet Extended Release 24 Hour; TAKE 1 TABLET      TWICE DAILY -TAKE WITH A MEAL OR FOOD; Therapy: 78BZU4805 to ([facility])  Requested for: 37AYL1560; Last      Rx:76Lls8094 Ordered   12  NovoLOG 100 UNIT/ML Subcutaneous Solution; INJECT 4 UNITS TO 14 UNITS      SUBCUTANEOUSLY THREE TIMES DAILY IN THE MORNING, WITH LUNCH, AND WITH      DINNER (DIABETES MELLITUS) (ROTATE SITES); Therapy: 79RLB9057 to (Evaluate:01Ulx3191)  Requested for: 11Fbt8736 Recorded   13  ProAir  (90 Base) MCG/ACT Inhalation Aerosol Solution; INHALE 2 PUFFS      EVERY 4-6 HOURS AS NEEDED; Therapy: 32NMG2807 to (GOLVOSMB:55UKB1878)  Requested for: 25Anu3558; Last      Rx:30Kkp5119 Ordered   14  Synthroid 50 MCG Oral Tablet; TAKE 1 TABLET DAILY AS DIRECTED; Therapy: 82PEX1892 to (Evaluate:05Unl5270)  Requested for: 38SSY8269 Recorded   15  TiZANidine HCl - 2 MG Oral Capsule; 1 or 2 TABLETS AT BEDTIME; Therapy: 69Hvg4725 to Recorded   16  Vitamin D3 2000 UNIT Oral Capsule; TAKE 1 CAPSULE ONCE DAILY; Therapy: (Recorded:79Ctu7016) to Recorded  Medication List Reviewed: The medication list was reviewed and updated today  Allergies   1  No Known Drug Allergies  2   Seasonal    Vitals   Vital Signs    Recorded: 09YWV0404 02:07PM   Temperature 98 2 F, Tympanic   Heart Rate 72, L Radial   Pulse Quality Normal, L Radial   Respiration Quality Normal   Respiration 16   Systolic 786, LUE, Sitting   Diastolic 64, LUE, Sitting Height 5 ft 1 5 in   Weight 205 lb 8 oz   BMI Calculated 38 2   BSA Calculated 1 92     Physical Exam        Carotid: no bruit heard on the right-- and-- no bruit on the left  Radial: right 2+-- and-- left 2+  Femoral: right 2+-- and-- left 2+  Dorsalis pedis: right 2+-- and-- left 2+  Distal Pulse Exam: Normal Capillary Refill  Extremities: No upper or lower extremity edema  LE Varicose Veins: right leg reticular veins,-- left leg reticular veins,-- right leg spider veins-- and-- left leg spider veins  The heart rate was normal  The rhythm was regular  Heart sounds: normal S1-- and-- normal S2       Murmurs: No murmurs were heard  Pulmonary      Respiratory effort: No increased work of breathing or signs of respiratory distress  Auscultation of lungs: Clear to auscultation  No wheezing, no rales, no rhonchi  Abdomen      Abdomen: Abdomen soft, non-tender, no masses, non distended, no rebound tenderness  No palpable aneurysm  Psychiatric      Orientation to person, place and time: Normal       Mood and affect: Normal       Eyes      Conjunctiva and lids: No swelling, erythema, or discharge  Pupils and irises: Equal, round and reactive to light  Ears, Nose, Mouth, and Throat      Hearing: Normal       Musculoskeletal      Gait and station: Normal       Results/Data   Diagnostic Studies Reviewed Vasc: I personally reviewed the films/images/results in the office today  My interpretation follows  MRI Review MRI demonstrates Moderate canal stenosis at L4-L5 and mild at L3-L4  * MRI LUMBAR SPINE WO CONTRAST 36TIK7499 09:18AM [facility], Provider   Test ordered by: Fritz Brunson      Test Name Result Flag Reference   MRI LUMBAR SPINE WO CONTRAST (Report)     MRI LUMBAR SPINE WITHOUT CONTRAST           INDICATION: M48 07: Spinal stenosis, lumbosacral region  History taken directly from the electronic ordering system             COMPARISON: MRI performed on 6/9/2015           TECHNIQUE: Sagittal T1, sagittal T2, sagittal inversion recovery, axial T1 and axial T2, coronal T2             IMAGE QUALITY: Diagnostic           FINDINGS:      Counting reference: Lumbosacral Junction For the purposes of this report, L4-5 is considered just above the level of the iliac crest            ALIGNMENT: The overall alignment appears maintained  MARROW SIGNAL: Marrow signal is within normal limits without signs of an infiltrative process  No signs of acute fracture or significant marrow edema pattern  Vertebral body heights are maintained  Question sequela prior intervention at left L4-L5  DISTAL CORD AND CONUS: Normal size and signal within the distal cord and conus  The conus ends at the L1 level  PARASPINAL SOFT TISSUES: Bilateral renal cysts  Atrophy of the gluteal and paraspinal musculature noted  SACRUM: Normal signal within the sacrum  No evidence of insufficiency or stress fracture  LOWER THORACIC DISC SPACES:       T12-L1: Right subarticular to foraminal protrusion  Suggestion of mild spinal canal stenosis but no axial images were obtained through this level  Mild right and no significant left neural foraminal stenosis  LUMBAR DISC SPACES:              L1-L2: No significant spinal canal stenosis  No right and no left neural foraminal stenosis  No significant interval change  L2-L3: No significant spinal canal stenosis  No right and no left neural foraminal stenosis  No significant interval change  L3-L4: Circumferential disc bulge with ligamentum flavum thickening facet arthropathy  Mild spinal canal stenosis  No right and no left neural foraminal stenosis  No significant interval change  L4-L5: Bilateral facet arthropathy  Circumferential disc bulge with ligamentum flavum thickening  Moderate spinal canal stenosis  Mild right and mild left neural foraminal stenosis   No significant interval change  L5-S1: Interspace narrowing with a superimposed left foraminal protrusion  Bilateral facet arthropathy  No significant spinal canal stenosis  No right and mild left neural foraminal stenosis  No significant interval change  IMPRESSION:      Stable MRI of the lumbar spine when compared to 6/9/2015  Moderate spinal canal stenosis at L4-L5 and mild at L3-L4  Mild degrees of foraminal stenosis as detailed level by level  Workstation performed: [id number]           Signed by:      Genevieve Moore MD      11/30/17      Future Appointments      Date/Time Provider Specialty Site   04/03/2018 01:30 PM Mike Gama DO [facility]   02/05/2018 03:00 PM Tho Amato MD Pain Management [facility]   03/12/2018 10:30 AM ERMA Duong  Neurosurgery [address]     Signatures    Electronically signed by :  Heike Pearson MD; Jan 9 2018  2:50PM EST                       (Author)

## 2018-01-10 NOTE — RESULT NOTES
Message   Notify the patient normal vitamin D level follow up as scheduled        Verified Results  (1) VITAMIN B12 73Hsj0725 02:54PM [place] [facility] [place] [facility]"" Order Number: [id number]     Test Name Result Flag Reference   VITAMIN B12 262 pg/mL  100-900     (1) VITAMIN D 25-HYDROXY 86Kqp6946 02:54PM [place] [facility] [place] [facility]\"" Order Number: [id number]     Test Name Result Flag Reference   VIT D 25-HYDROX 34 0 ng/mL  30 0-100 0   This assay is a certified procedure of the CDC Vitamin D Standardization Certification Program (VDSCP)     Deficiency <20ng/ml   Insufficiency 20-30ng/ml   Sufficient  ng/ml     *Patients undergoing fluorescein dye angiography may retain small amounts of fluorescein in the body for 48-72 hours post procedure  Samples containing fluorescein can produce falsely elevated Vitamin D values  If the patient had this procedure, a specimen should be resubmitted post fluorescein clearance         Signatures   Electronically signed by : Marielle Green DO; Sep 30 2016 12:04PM EST                       (Author)

## 2018-01-10 NOTE — PSYCH
History of Present Illness  Psychotherapy Provided [facility]: Individual Psychotherapy 45 minutes minutes provided today  Goals addressed in session:   Erin Coreas continues to experience stress and anxiety secondary to behavioral issues with her elderly mother rand its effect on her  and their relationship  Has limited control with these issues  She is verbal, cooperative and well oriented  HPI - Psych: Erin Coreas continues to deal with stress from aforementioned stressors  Her son and daughter supportive  She has close relationships with her grandchildren which is helpful and offsets some stress  Denies any acute depressive symptoms  No SI   Note   Note:   Reviewed stress mgmt strategies as well as boundaries to maintain with others at home to reduce anxiety and she agrees  Offered additional sessions on a PRN basis  Assessment    1   Anxiety (300 00) (F41 9)    Signatures   Electronically signed by : Candy Diaz LCSW; Nov 29 2017  5:51PM EST                       (Author)

## 2018-01-10 NOTE — RESULT NOTES
Verified Results  * CT CHEST WO CONTRAST [id number] 10:11AM Karlie Villeda Order Number: [id number]    - Patient Instructions: [address]     Test Name Result Flag Reference   CT CHEST WO CONTRAST (Report)     CT CHEST WITHOUT IV CONTRAST     INDICATION: Follow-up pulmonary nodule  COMPARISON: CT abdomen pelvis 4/18/2017  TECHNIQUE: CT examination of the chest was performed without intravenous contrast  Reformatted images were created in axial, sagittal, and coronal planes  Radiation dose length product (DLP) for this visit: 128 mGy-cm   This examination, like all CT scans performed in the [facility], was performed utilizing techniques to minimize radiation dose exposure, including the use of iterative    reconstruction and automated exposure control  FINDINGS:     LUNGS: Stable 6 mm right middle lobe pulmonary nodule is noted series 3 image 31  Minimal linear scarring is seen within the left lower lobe  PLEURA: Unremarkable  HEART/GREAT VESSELS: Patient is status post coronary artery bypass grafting  MEDIASTINUM AND SAMANTHA: Unremarkable  CHEST WALL AND LOWER NECK: Unremarkable  VISUALIZED STRUCTURES IN THE UPPER ABDOMEN: Unremarkable  OSSEOUS STRUCTURES: No acute fracture  No destructive osseous lesion  IMPRESSION:     Stable 6 mm right middle lobe pulmonary nodule  Continued follow-up recommended as described on previous exam        Workstation performed: [id number]     Signed by:    Angelique Car MD   6/12/17

## 2018-01-10 NOTE — PSYCH
Message  Message Free Text Note Form: was informed via SMART that she cancelled her 1 PM Caregiver Support Group session for today with reason reported; Active Problems    1  Abdominal pain (789 00) (R10 9)   2  Abdominal pain, epigastric (789 06) (R10 13)   3  Abnormal blood chemistry (790 6) (R79 9)   4  Abrasion (919 0) (T14 8)   5  Abrasion of arm, left (913 0) (S40 812A)   6  Acute deep vein thrombosis of lower extremity (453 40) (I82 409)   7  Acute upper respiratory infection (465 9) (J06 9)   8  Adenomatous polyp of colon (211 3) (D12 6)   9  Anemia (285 9) (D64 9)   10  Anosmia (781 1) (R43 0)   11  Antral ulcer (531 90) (K25 9)   12  Anxiety (300 00) (F41 9)   13  Asthma (493 90) (J45 909)   14  Atypical chest pain (786 59) (R07 89)   15  Balance problems (781 99) (R26 89)   16  Benign essential hypertension (401 1) (I10)   17  Bezoars (938)   18  Bronchitis, asthmatic (493 90) (J45 909)   19  Cervical disc disease (722 91) (M50 90)   20  Cervical disc herniation (722 0) (M50 20)   21  Cervical radiculopathy (723 4) (M54 12)   22  Cervical radiculopathy (723 4) (M54 12)   23  Cervical spinal stenosis (723 0) (M48 02)   24  Cervical spinal stenosis (723 0) (M48 02)   25  Cervical spinal stenosis (723 0) (M48 02)   26  Cervical strain (847 0) (S16 1XXA)   27  Chronic mastoiditis (383 1) (H70 10)   28  Chronic renal insufficiency (585 9) (N18 9)   29  Colon cancer screening (V76 51) (Z12 11)   30  Contusion of skin with intact surface (924 9) (T14 8)   31  Depression (311) (F32 9)   32  Depression with anxiety (300 4) (F41 8)   33  Diabetic polyneuropathy associated with type 2 diabetes mellitus (250 60,357 2)    (E11 42)   34  Diarrhea (787 91) (R19 7)   35  Disc degeneration, lumbar (722 52) (M51 36)   36  Dizziness (780 4) (R42)   37  DM2 (diabetes mellitus, type 2) (250 00) (E11 9)   38  Dyspepsia (536 8) (K30)   39  Early satiety (780 94) (R68 81)   40   Elevated C-reactive protein (790 95) (R79 82)   41  Encounter for screening for osteoporosis (V82 81) (Z13 820)   42  Encounter for screening mammogram for breast cancer (V76 12) (Z12 31)   43  Falls (E888 9) (W19 XXXA)   44  Fatigue (780 79) (R53 83)   45  Flushing (782 62) (R23 2)   46  Headache (784 0) (R51)   47  History of allergy (V15 09) (Z88 9)   48  Hyperlipidemia (272 4) (E78 5)   49  Hypomagnesemia (275 2) (E83 42)   50  Influenza vaccination administered during current admission (V04 81) (Z23)   51  Initial Medicare annual wellness visit (V70 0) (Z00 00)   52  Low vitamin B12 level (266 2) (E53 8)   53  Lumbar radiculopathy (724 4) (M54 16)   54  Macular Retinal Edema Of Both Eyes (362 83)   55  Microalbuminuria (791 0) (R80 9)   56  Multiple joint pain (719 49) (M25 50)   57  Myofascial pain syndrome (729 1) (M79 1)   58  Neck pain (723 1) (M54 2)   59  Need for pneumococcal vaccination (V03 82) (Z23)   60  Need for prophylactic vaccination and inoculation against influenza (V04 81) (Z23)   61  History of Need for prophylactic vaccination and inoculation against influenza (V04 81)    (Z23)   62  Pain in wrist, unspecified laterality (719 43) (M25 539)   63  Palpitations (785 1) (R00 2)   64  Panic attack (300 01) (F41 0)   65  Post-nasal drip (784 91) (R09 82)   66  Pounding heartbeat (785 1) (R00 2)   67  Preoperative clearance (V72 84) (Z01 818)   68  Primary hypothyroidism (244 9) (E03 9)   69  Psoriatic arthropathy (696 0) (L40 50)   70  Right knee pain (719 46) (M25 561)   71  Screening for genitourinary condition (V81 6) (Z13 89)   72  Screening for neurological condition (V80 09) (Z13 89)   73  Sinusitis (473 9) (J32 9)   74  Spinal stenosis (724 00) (M48 00)   75  Spondylosis of cervical region without myelopathy or radiculopathy (721 0) (M47 812)   76  Spondylosis of lumbar region without myelopathy or radiculopathy (721 3) (M47 816)   77  Umbilical hernia (434 5) (K42 9)   78  Urinary frequency (788 41) (R35 0)   79   Ventral hernia (553 20) (K43 9)   80  Visit for pre-operative examination (V72 84) (Z01 818)   81  Vitamin B12 deficiency (266 2) (E53 8)   82  Vitamin D deficiency (268 9) (E55 9)   83  Vitreo-retinal adhesions (379 29) (H43 89)    Current Meds   1  ALPRAZolam 0 5 MG Oral Tablet; TAKE 1 TABLET 3 times daily PRN; Therapy: 84FWJ7709 to (Evaluate:02Jan2017)  Requested for: 17ZVO1422; Last   Rx:04Oct2016 Ordered   2  AmLODIPine Besylate 2 5 MG Oral Tablet; take 2 tablet daily; Therapy: (Recorded:75Snf9387) to Recorded   3  Aspirin 81 MG TABS; Take 1 tablet daily; Therapy: (Recorded:12Kqn5739) to Recorded   4  Atenolol 25 MG Oral Tablet; take 0 5 tablet daily; Therapy: (Recorded:10Qyl1955) to Recorded   5  BD Pen Needle Clover U/F 32G X 4 MM Miscellaneous; use as directed; Therapy: 12JCR8216 to (Evaluate:10Cxo9730)  Requested for: 20Apr2015 Recorded   6  CoQ-10 100 MG Oral Capsule; TAKE 2 CAPSULE Twice daily; Therapy: (Recorded:04Gwq0667) to Recorded   7  Cymbalta 60 MG Oral Capsule Delayed Release Particles (DULoxetine HCl); TAKE 1   CAPSULE DAILY; Therapy: 37MJM8710 to (Evaluate:10Mar2016) Recorded   8  Enalapril Maleate 10 MG Oral Tablet; TAKE TABLET  TAKE 10 MG IN AM, 10 MG IN PM;   Therapy: 85OGB0917 to  Requested for: 63PDC1684 Recorded   9  Lantus SoloStar 100 UNIT/ML Subcutaneous Solution Pen-injector; INJECT 40 UNIT   Every AM and PM;   Therapy: 20Mar2012 to (Evaluate:64Bkg9154)  Requested for: 10YZD4806 Recorded   10  Lipitor 40 MG Oral Tablet (Atorvastatin Calcium); TAKE 1 TABLET DAILY; Therapy: 59VLB1726 to (Evaluate:18Oct2015) Recorded   11  MetFORMIN HCl  MG Oral Tablet Extended Release 24 Hour; TAKE 1 TABLET    TWICE DAILY -TAKE WITH A MEAL OR FOOD; Therapy: 19MPN8446 to (L[place]to Person)  Requested for: 36XTH3537; Last    Rx:78Woc9650 Ordered   12   NovoLOG 100 UNIT/ML Subcutaneous Solution; INJECT 4 UNITS TO 14 UNITS    SUBCUTANEOUSLY THREE TIMES DAILY IN THE MORNING, WITH LUNCH, AND WITH    DINNER (DIABETES MELLITUS) (ROTATE SITES); Therapy: 31TWF3850 to (Evaluate:72Enw6211)  Requested for: 21Apr2015 Recorded   13  ProAir  (90 Base) MCG/ACT Inhalation Aerosol Solution; INHALE 2 PUFFS    EVERY 4-6 HOURS AS NEEDED; Therapy: 78OQT0692 to (EATRABRY:30THZ7319)  Requested for: 08Jnn5897; Last    Rx:11Wps9187 Ordered   14  Synthroid 50 MCG Oral Tablet (Levothyroxine Sodium); TAKE 1 TABLET DAILY AS    DIRECTED; Therapy: 90ZNW1964 to (Evaluate:54Czl5960)  Requested for: 84ODY7403 Recorded   15  TraMADol HCl - 50 MG Oral Tablet; TAKE TABLET  ONE DAILY AS NEEDED FOR PAIN;    Therapy: (Recorded:30Nov2015) to Recorded   16  Vitamin D3 2000 UNIT Oral Capsule; TAKE 1 CAPSULE ONCE DAILY; Therapy: (Recorded:21Apr2015) to Recorded   17  Voltaren 1 % Transdermal Gel (Diclofenac Sodium); APPLY TO LOWER EXTREMITIES, 4    GM OF GEL TO AFFECTED AREA 4 TIMES DAILY  DO NOT APPLY MORE    THAN 16 GM DAILY TO ANY ONE AFFECTED JOINT; Therapy: 10IKX0404 to (Evaluate:30Jan2015)  Requested for: 34Lmj4671; Last    Rx:66Hod2585 Ordered    Allergies    1  No Known Drug Allergies    2   Seasonal    Signatures   Electronically signed by : HALEIGH Fofana,MARIANGELW; Dec 21 2016 10:10AM EST                       (Author)

## 2018-01-10 NOTE — RESULT NOTES
Message   Notify the patient elevation of the hemoglobin A1c= 9 3 please have the patient follow-up with me to discuss and also follow-up with endocrinology reset the patient reduce carbohydrates and sweets in the diet following a healthy and balanced diet and occurred the blood sugar 2 times per day        Verified Results  (1) VITAMIN B12 29Sep2016 02:54PM [facility] Other Order Number: [id number]     Test Name Result Flag Reference   VITAMIN B12 262 pg/mL  100-900     (1) VITAMIN D 25-HYDROXY 29Sep2016 02:54PM [facility] Other Order Number: [id number]     Test Name Result Flag Reference   VIT D 25-HYDROX 34 0 ng/mL  30 0-100 0   This assay is a certified procedure of the CDC Vitamin D Standardization Certification Program (VDSCP)     Deficiency <20ng/ml   Insufficiency 20-30ng/ml   Sufficient  ng/ml     *Patients undergoing fluorescein dye angiography may retain small amounts of fluorescein in the body for 48-72 hours post procedure  Samples containing fluorescein can produce falsely elevated Vitamin D values  If the patient had this procedure, a specimen should be resubmitted post fluorescein clearance  (1) CBC/ PLT (NO DIFF) 29Sep2016 02:54PM [facility] Other Order Number: [id number]     Test Name Result Flag Reference   HEMATOCRIT 36 6 %  34 8-46 1   HEMOGLOBIN 11 6 g/dL  11 5-15 4   MCHC 31 7 g/dL  31 4-37 4   MCH 27 1 pg  26 8-34 3   MCV 86 fL  82-98   PLATELET COUNT 092 Thousands/uL  149-390   RBC COUNT 4 28 Million/uL  3 81-5 12   RDW 14 9 %  11 6-15 1   WBC COUNT 8 01 Thousand/uL  4 31-10 16   MPV 11 9 fL  8 9-12 7     (1) COMPREHENSIVE METABOLIC PANEL 79KPM9120 25:98BF [place]   TW Order Number: [id number]     Test Name Result Flag Reference   GLUCOSE,RANDM 134 mg/dL     If the patient is fasting, the ADA then defines impaired fasting glucose as > 100 mg/dL and diabetes as > or equal to 123 mg/dL     SODIUM 138 mmol/L  136-145   POTASSIUM 4 4 mmol/L  3 5-5 3   CHLORIDE 101 mmol/L  100-108   CARBON DIOXIDE 28 mmol/L  21-32   ANION GAP (CALC) 9 mmol/L  4-13   BLOOD UREA NITROGEN 12 mg/dL  5-25   CREATININE 1 27 mg/dL  0 60-1 30   Standardized to IDMS reference method   CALCIUM 9 2 mg/dL  8 3-10 1   BILI, TOTAL 0 40 mg/dL  0 20-1 00   ALK PHOSPHATAS 130 U/L H    ALT (SGPT) 37 U/L  12-78   AST(SGOT) 36 U/L  5-45   ALBUMIN 3 5 g/dL  3 5-5 0   TOTAL PROTEIN 8 1 g/dL  6 4-8 2   eGFR Non-African American 41 7 ml/min/1 73sq [facility] Disease Education Program recommendations are as follows:  GFR calculation is accurate only with a steady state creatinine  Chronic Kidney disease less than 60 ml/min/1 73 sq  meters  Kidney failure less than 15 ml/min/1 73 sq  meters  (1) HEMOGLOBIN A1C 45Gzb9769 02:54PM [facility] [employer] Order Number: [id number]     Test Name Result Flag Reference   HEMOGLOBIN A1C 9 3 % H 4 2-6 3   EST  AVG   GLUCOSE 220 mg/dl         Signatures   Electronically signed by : Rosetta Melendez DO; Sep 30 2016 12:06PM EST                       (Author)

## 2018-01-10 NOTE — PSYCH
History of Present Illness  Psychotherapy Provided [place]: Individual Psychotherapy 45 minutes minutes provided today  Goals addressed in session:   Patient under great deal of stress at home care taking her elderly mother and managing mother's personality issues  This is primary stressors  Has been long-standing issues for her  Creating issues with increased muscle tension/pain and fatigue  Patient verbal and cooperative  HPI - Psych: Patient continues to struggle with stress in home environment  Tends to manage other and their issues while making her own secondary  Denies any acute symptoms of depression  Has very little private time/activity and guilt from mother has prevented her from cultivating this activity   Note   Note:   Reviewed stress mgmt strategies to offset issues at home  Needs to establish more rigid boundaries with mother and her activities outside of home may help this  Discussed referral to caregiver stress group at [facility] and will facilitate this referral  Assessment    1   Anxiety (300 00) (F41 9)    Signatures   Electronically signed by : Maribel Quiroz LCSW; May 25 2016 12:04PM EST                       (Author)

## 2018-01-11 NOTE — PSYCH
Message  Message Free Text Note Form: was informed via SMART that she lm to cancel her Caregiver Group session for 2/15/17 @ 1 PM with no reason provided; Active Problems    1  Abdominal pain (789 00) (R10 9)   2  Abdominal pain, epigastric (789 06) (R10 13)   3  Abnormal blood chemistry (790 6) (R79 9)   4  Abrasion (919 0) (T14 8)   5  Abrasion of arm, left (913 0) (S40 812A)   6  Acute deep vein thrombosis of lower extremity (453 40) (I82 409)   7  Acute upper respiratory infection (465 9) (J06 9)   8  Adenomatous polyp of colon (211 3) (D12 6)   9  Anemia (285 9) (D64 9)   10  Anosmia (781 1) (R43 0)   11  Antral ulcer (531 90) (K25 9)   12  Anxiety (300 00) (F41 9)   13  Asthma (493 90) (J45 909)   14  Atypical chest pain (786 59) (R07 89)   15  Balance problems (781 99) (R26 89)   16  Benign essential hypertension (401 1) (I10)   17  Bezoars (938)   18  Bronchitis, asthmatic (493 90) (J45 909)   19  Cervical disc disease (722 91) (M50 90)   20  Cervical disc herniation (722 0) (M50 20)   21  Cervical radiculopathy (723 4) (M54 12)   22  Cervical radiculopathy (723 4) (M54 12)   23  Cervical spinal stenosis (723 0) (M48 02)   24  Cervical spinal stenosis (723 0) (M48 02)   25  Cervical spinal stenosis (723 0) (M48 02)   26  Cervical strain (847 0) (S16 1XXA)   27  Chronic mastoiditis (383 1) (H70 10)   28  Chronic renal insufficiency (585 9) (N18 9)   29  Colon cancer screening (V76 51) (Z12 11)   30  Contusion of skin with intact surface (924 9) (T14 8)   31  Depression (311) (F32 9)   32  Depression with anxiety (300 4) (F41 8)   33  Diabetic polyneuropathy associated with type 2 diabetes mellitus (250 60,357 2)    (E11 42)   34  Diarrhea (787 91) (R19 7)   35  Disc degeneration, lumbar (722 52) (M51 36)   36  Dizziness (780 4) (R42)   37  DM2 (diabetes mellitus, type 2) (250 00) (E11 9)   38  Dyspepsia (536 8) (K30)   39  Early satiety (780 94) (R68 81)   40   Elevated C-reactive protein (790 95) (R79 82)   41  Encounter for screening for osteoporosis (V82 81) (Z13 820)   42  Encounter for screening mammogram for breast cancer (V76 12) (Z12 31)   43  Falls (E888 9) (W19 XXXA)   44  Fatigue (780 79) (R53 83)   45  Flushing (782 62) (R23 2)   46  Headache (784 0) (R51)   47  History of allergy (V15 09) (Z88 9)   48  Hyperlipidemia (272 4) (E78 5)   49  Hypomagnesemia (275 2) (E83 42)   50  Influenza vaccination administered during current admission (V04 81) (Z23)   51  Initial Medicare annual wellness visit (V70 0) (Z00 00)   52  Low vitamin B12 level (266 2) (E53 8)   53  Lumbar radiculopathy (724 4) (M54 16)   54  Macular Retinal Edema Of Both Eyes (362 83)   55  Microalbuminuria (791 0) (R80 9)   56  Multiple joint pain (719 49) (M25 50)   57  Myofascial pain syndrome (729 1) (M79 1)   58  Neck pain (723 1) (M54 2)   59  Need for pneumococcal vaccination (V03 82) (Z23)   60  Need for prophylactic vaccination and inoculation against influenza (V04 81) (Z23)   61  History of Need for prophylactic vaccination and inoculation against influenza (V04 81)    (Z23)   62  Pain in wrist, unspecified laterality (719 43) (M25 539)   63  Palpitations (785 1) (R00 2)   64  Panic attack (300 01) (F41 0)   65  Post-nasal drip (784 91) (R09 82)   66  Pounding heartbeat (785 1) (R00 2)   67  Preoperative clearance (V72 84) (Z01 818)   68  Primary hypothyroidism (244 9) (E03 9)   69  Psoriatic arthropathy (696 0) (L40 50)   70  Right knee pain (719 46) (M25 561)   71  Screening for genitourinary condition (V81 6) (Z13 89)   72  Screening for neurological condition (V80 09) (Z13 89)   73  Sinusitis (473 9) (J32 9)   74  Spinal stenosis (724 00) (M48 00)   75  Spondylosis of cervical region without myelopathy or radiculopathy (721 0) (M47 812)   76  Spondylosis of lumbar region without myelopathy or radiculopathy (721 3) (M47 816)   77  Umbilical hernia (570 2) (K42 9)   78  Urinary frequency (788 41) (R35 0)   79   Ventral hernia (553 20) (K43 9)   80  Visit for pre-operative examination (V72 84) (Z01 818)   81  Vitamin B12 deficiency (266 2) (E53 8)   82  Vitamin D deficiency (268 9) (E55 9)   83  Vitreo-retinal adhesions (379 29) (H43 89)    Current Meds   1  ALPRAZolam 0 5 MG Oral Tablet; TAKE 1 TABLET 3 times daily PRN; Therapy: 71ZWN0813 to (Evaluate:02Jan2017)  Requested for: 75TKF3528; Last   Rx:04Oct2016 Ordered   2  AmLODIPine Besylate 2 5 MG Oral Tablet; take 2 tablet daily; Therapy: (Recorded:63Iak7767) to Recorded   3  Aspirin 81 MG TABS; Take 1 tablet daily; Therapy: (Recorded:03Eki8196) to Recorded   4  Atenolol 25 MG Oral Tablet; take 0 5 tablet daily; Therapy: (Recorded:20Apr2015) to Recorded   5  BD Pen Needle Clover U/F 32G X 4 MM Miscellaneous; use as directed; Therapy: 34UZY4018 to (Evaluate:31Hrv5846)  Requested for: 20Apr2015 Recorded   6  CoQ-10 100 MG Oral Capsule; TAKE 2 CAPSULE Twice daily; Therapy: (Recorded:30Nov2015) to Recorded   7  Cymbalta 60 MG Oral Capsule Delayed Release Particles (DULoxetine HCl); TAKE 1   CAPSULE DAILY; Therapy: 04UVG1480 to (Evaluate:10Mar2016) Recorded   8  Enalapril Maleate 10 MG Oral Tablet; TAKE TABLET  TAKE 10 MG IN AM, 10 MG IN PM;   Therapy: 09FPX5790 to  Requested for: 42JIO9362 Recorded   9  Lantus SoloStar 100 UNIT/ML Subcutaneous Solution Pen-injector; INJECT 40 UNIT   Every AM and PM;   Therapy: 20Mar2012 to (Evaluate:22Whi8026)  Requested for: 41KGS1545 Recorded   10  Lipitor 40 MG Oral Tablet (Atorvastatin Calcium); TAKE 1 TABLET DAILY; Therapy: 21HAL6608 to (Evaluate:18Oct2015) Recorded   11  MetFORMIN HCl  MG Oral Tablet Extended Release 24 Hour; TAKE 1 TABLET    TWICE DAILY -TAKE WITH A MEAL OR FOOD; Therapy: 05FWR0695 to (Dick Sevilla)  Requested for: 04HVU4853; Last    Rx:77Eso6885 Ordered   12   NovoLOG 100 UNIT/ML Subcutaneous Solution; INJECT 4 UNITS TO 14 UNITS    SUBCUTANEOUSLY THREE TIMES DAILY IN THE MORNING, WITH LUNCH, AND WITH    DINNER (DIABETES MELLITUS) (ROTATE SITES); Therapy: 11DPX3743 to (Evaluate:39Gkj0806)  Requested for: 21Apr2015 Recorded   13  ProAir  (90 Base) MCG/ACT Inhalation Aerosol Solution; INHALE 2 PUFFS    EVERY 4-6 HOURS AS NEEDED; Therapy: 11YID2489 to (NRLEYWIR:22AAV8179)  Requested for: 75Sbl5090; Last    Rx:08Zxz3369 Ordered   14  Synthroid 50 MCG Oral Tablet (Levothyroxine Sodium); TAKE 1 TABLET DAILY AS    DIRECTED; Therapy: 02KBW1298 to (Evaluate:08Xtj2750)  Requested for: 62BTI3232 Recorded   15  TraMADol HCl - 50 MG Oral Tablet; TAKE TABLET  ONE DAILY AS NEEDED FOR PAIN;    Therapy: (Recorded:30Nov2015) to Recorded   16  Vitamin D3 2000 UNIT Oral Capsule; TAKE 1 CAPSULE ONCE DAILY; Therapy: (Recorded:21Apr2015) to Recorded   17  Voltaren 1 % Transdermal Gel (Diclofenac Sodium); APPLY TO LOWER EXTREMITIES, 4    GM OF GEL TO AFFECTED AREA 4 TIMES DAILY  DO NOT APPLY MORE    THAN 16 GM DAILY TO ANY ONE AFFECTED JOINT; Therapy: 11VFL9486 to (Evaluate:30Jan2015)  Requested for: 55Gpp7628; Last    Rx:10Dna1444 Ordered    Allergies    1  No Known Drug Allergies    2   Seasonal    Signatures   Electronically signed by : Luan Hartmann, HALEIGH,MARIANGELW; Feb 14 2017  1:56PM EST                       (Author)

## 2018-01-11 NOTE — PROGRESS NOTES
Assessment    1  Medicare annual wellness visit, subsequent (V70 0) (Z00 00)   2  Spinal stenosis (724 00) (M48 00)   3  Depression (311) (F32 9)    Assessment and plan 1  Medicare wellness examination completed for the patient I have counselled the pt to follow a healthy and balanced diet ,and recommend routine exercise  Will be checking a comprehensive metabolic panel, microalbumin patient up-to-date with the colonoscopy number sign 2 spinal stenosis see neurosurgery Dr Carmen Johnson 3  Anxiety and depression no suicidal ideation I will have the patient meet with Counsellor Juan UMANZOR 6 months call if any problems     Plan  Anemia    · (1) CBC/ PLT (NO DIFF); Status:Active; Requested FHN:[id number];    · (1) FERRITIN; Status:Active; Requested for:21Mar2018;    · (1) TIBC; Status:Active; Requested for:21Mar2018;   DM2 (diabetes mellitus, type 2)    · *VB - Foot Exam; Status:Temporary Deferral - Pt refuses;   DM2 (diabetes mellitus, type 2), Medicare annual wellness visit, subsequent    · (1) HEMOGLOBIN A1C; Status:Active; Requested for:21Mar2018;    · (1) LIPID PANEL, FASTING; Status:Active; Requested TWZ:[id number]; Health Maintenance    · Medicare Annual Wellness Visit ; every 1 year; Last [id number]; Next 21Nov2018;  Status:Active  Medicare annual wellness visit, subsequent    · (1) COMPREHENSIVE METABOLIC PANEL; Status:Active; Requested for:21Mar2018;    · (1) MICROALBUMIN CREATININE RATIO, RANDOM URINE; Status:Active; Requested  for:21Mar2018;   Spinal stenosis    · 1 - Robert YANES, [facility] Co-Management  *  Status: Active  Requested for:  [id number]  Care Summary provided  : Yes    Chief Complaint  Patient is here for a Medicare Wellness Exam       History of Present Illness  Welcome to Medicare and Wellness Visits: The patient is being seen for the subsequent annual wellness visit     Medicare Screening and Risk Factors   Hospitalizations: she has been previously hospitalizied and 2   Medicare Screening Tests Risk Questions   Abdominal aortic aneurysm risk assessment: none indicated  Osteoporosis risk assessment: over 48years of age  Drug and Alcohol Use: The patient is a former cigarette smoker and quit smoking 1980's  The patient reports rare alcohol use  She has never used illicit drugs  Diet and Physical Activity: Current diet includes 1 servings of fruit per day, 2 servings of vegetables per day, 1 servings of meat per day, 2 servings of simple carbohydrates per day, 1 servings of dairy products per day and 4 cans of diet soda per day  She exercises infrequently  Mood Disorder and Cognitive Impairment Screening: PHQ-9 Depression Scale   Over the past 2 weeks, how often have you been bothered by the following problems? 1 ) Little interest or pleasure in doing things? Several days  2 ) Feeling down, depressed or hopeless? Several days  3 ) Trouble falling asleep or sleeping too much? Not at all    4 ) Feeling tired or having little energy? Half the days or more  5 ) Poor appetite or overeating? Half the days or more  6 ) Feeling bad about yourself, or that you are a failure, or have let yourself or your family down? Several days  7 ) Trouble concentrating on things, such as reading a newspaper or watching television? Not at all    8 ) Moving or speaking so slowly that other people could have noticed, or the opposite, moving or speaking faster than usual? Not at all  TOTAL SCORE: 7, severity of depression is mild  How difficult have these problems made it for you to do your work, take care of things at home, or get along with people? Very difficult  She reports feeling down, depressed, or hopeless over the past two weeks  She reports feeling little interest or pleasure in doing things over the past two weeks  Further Evaluation: no si     Cognitive impairment screening: denies difficulty learning/retaining new information, denies difficulty handling complex tasks, denies difficulty with reasoning, denies difficulty with spatial ability and orientation, denies difficulty with language and denies difficulty with behavior  Functional Ability/Level of Safety: Hearing is normal bilaterally  She does not use a hearing aid  The patient is currently able to drive without limitations  Activities of daily living details: does not need help using the phone, no transportation help needed, does not need help shopping, no meal preparation help needed, does not need help doing housework, does not need help doing laundry, does not need help managing medications and does not need help managing money  Fall risk factors: The patient fell 0 times in the past 12 months  Home safety risk factors:  no unfamiliar surroundings, no loose rugs, no poor household lighting, no uneven floors, no household clutter, grab bars in the bathroom and handrails on the stairs  Advance Directives: Advance directives: living will, durable power of  for health care directives and advance directives  Co-Managers and Medical Equipment/Suppliers: See Patient Care Team   Preventive Quality Program 65 and Older: Falls Risk: The patient fell 0 times in the past 12 months  The patient currently has no urinary incontinence symptoms  Patient Care Team    Care Team Member Role Specialty Office Number   Pepe Elliott MD  Pain Management [phone]   Stephan Heart MD  Cardiology [phone]   Tanika OREILLY  Gastroenterology Adult [phone]   Gaurang Velazquez MD  Endocrinology [phone]   Rl OREILLY  Specialist Neurosurgery [phone]   Lazaro OREILLY  Specialist Pulmonary Medicine [phone]     Review of Systems    Psychiatric: anxiety and depression, but not suicidal       Active Problems    1  Abdominal pain (789 00) (R10 9)   2  Abdominal pain, epigastric (789 06) (R10 13)   3  Abnormal blood chemistry (790 6) (R79 9)   4  Abrasion (919 0) (T14 8XXA)   5  Abrasion of arm, left (913 0) (S40 812A)   6  History of Acute embolism and thrombosis of unspecified deep veins of unspecified   lower extremity (453 40) (I82 409)   7  Acute upper respiratory infection (465 9) (J06 9)   8  Adenomatous polyp of colon (211 3) (D12 6)   9  Anemia (285 9) (D64 9)   10  Anosmia (781 1) (R43 0)   11  Antral ulcer (531 90) (K25 9)   12  Anxiety (300 00) (F41 9)   13  Asthma (493 90) (J45 909)   14  Atypical chest pain (786 59) (R07 89)   15  Balance problems (781 99) (R26 89)   16  Benign essential hypertension (401 1) (I10)   17  Bezoars (938)   18  Bronchitis, asthmatic (493 90) (J45 909)   19  Cervical disc disease (722 91) (M50 90)   20  Cervical disc herniation (722 0) (M50 20)   21  Cervical radiculopathy (723 4) (M54 12)   22  Cervical radiculopathy (723 4) (M54 12)   23  Cervical spinal stenosis (723 0) (M48 02)   24  Cervical spinal stenosis (723 0) (M48 02)   25  Cervical spinal stenosis (723 0) (M48 02)   26  Cervical strain (847 0) (S16 1XXA)   27  Chronic mastoiditis (383 1) (H70 10)   28  Chronic renal insufficiency (585 9) (N18 9)   29  Colon cancer screening (V76 51) (Z12 11)   30  Colon polyps (211 3) (K63 5)   31  Complex endometrial hyperplasia (621 32) (N85 01)   32  Contusion of skin with intact surface (924 9) (T14 8XXA)   33  Depression (311) (F32 9)   34  Depression with anxiety (300 4) (F41 8)   35  Diabetic polyneuropathy associated with type 2 diabetes mellitus (250 60,357 2)    (E11 42)   36  Diarrhea (787 91) (R19 7)   37  Disc degeneration, lumbar (722 52) (M51 36)   38  Dizziness (780 4) (R42)   39  DM2 (diabetes mellitus, type 2) (250 00) (E11 9)   40  DVT (deep venous thrombosis) (453 40) (I82 409)   41  Dyspepsia (536 8) (K30)   42  Early satiety (780 94) (R68 81)   43  Elevated C-reactive protein (790 95) (R79 82)   44  Encounter for screening for osteoporosis (V82 81) (Z13 820)   45   Encounter for screening mammogram for breast cancer (V76 12) (Z12 31)   46  Falls (E888 9) (W19 XXXA)   47  Fatigue (780 79) (R53 83)   48  Flushing (782 62) (R23 2)   49  Headache (784 0) (R51)   50  Heart palpitations (785 1) (R00 2)   51  History of allergy (V15 09) (Z88 9)   52  Hyperlipidemia (272 4) (E78 5)   53  Hypomagnesemia (275 2) (E83 42)   54  Influenza vaccination administered during current admission (V04 81) (Z23)   55  Initial Medicare annual wellness visit (V70 0) (Z00 00)   56  Intercostal pain (786 59) (R07 82)   57  Lack of physical activity (V69 0) (Z72 3)   58  Low vitamin B12 level (266 2) (E53 8)   59  Lumbar radicular pain (724 4) (M54 16)   60  Lumbar radiculopathy (724 4) (M54 16)   61  Lung nodule (793 11) (R91 1)   62  Macular Retinal Edema Of Both Eyes (362 83)   63  Microalbuminuria (791 0) (R80 9)   64  Multiple joint pain (719 49) (M25 50)   65  Myofascial pain syndrome (729 1) (M79 1)   66  Neck pain (723 1) (M54 2)   67  Need for influenza vaccination (V04 81) (Z23)   68  Need for pneumococcal vaccination (V03 82) (Z23)   69  Need for prophylactic vaccination and inoculation against influenza (V04 81) (Z23)   70  History of Need for prophylactic vaccination and inoculation against influenza (V04 81)    (Z23)   71  Onychomycosis (110 1) (B35 1)   72  Pain in wrist, unspecified laterality (719 43) (M25 539)   73  Palpitations (785 1) (R00 2)   74  Panic attack (300 01) (F41 0)   75  Post-nasal drip (784 91) (R09 82)   76  Postoperative visit (V58 49) (Z48 89)   77  Pounding headache (784 0) (R51)   78  Pounding heartbeat (785 1) (R00 2)   79  Preoperative clearance (V72 84) (Z01 818)   80  Primary hypothyroidism (244 9) (E03 9)   81  Psoriatic arthropathy (696 0) (L40 50)   82  Right knee pain (719 46) (M25 561)   83  Screening for genitourinary condition (V81 6) (Z13 89)   84  Screening for neurological condition (V80 09) (Z13 89)   85  Shortness of breath (786 05) (R06 02)   86  Sinusitis (473 9) (J32 9)   87   Spinal stenosis (724 00) (M48 00)   88  Spondylosis of cervical region without myelopathy or radiculopathy (721 0) (M47 812)   89  Spondylosis of lumbar region without myelopathy or radiculopathy (721 3) (M47 816)   90  Thickened endometrium (793 5) (R93 8)   91  Umbilical hernia (154 1) (K42 9)   92  Urinary frequency (788 41) (R35 0)   93  Ventral hernia (553 20) (K43 9)   94  Visit for pre-operative examination (V72 84) (Z01 818)   95  Vitamin B12 deficiency (266 2) (E53 8)   96  Vitamin D deficiency (268 9) (E55 9)   97   Vitreo-retinal adhesions (379 29) (H43 89)    Past Medical History    · History of Acute embolism and thrombosis of unspecified deep veins of unspecified  lower extremity (453 40) (I82 409)   · Adenomatous polyp of colon (211 3) (D12 6)   · History of Anosmia (781 1) (R43 0)   · History of Anxiety (300 00) (F41 9)   · History of Arm numbness (782 0) (R20 0)   · History of Atypical chest pain (786 59) (R07 89)   · History of Bilateral arm weakness (729 89) (R29 898)   · History of Breast pain (611 71) (N64 4)   · History of Breast tenderness (611 71) (N64 4)   · History of Coronary Artery Disease (V12 59)   · History of H/O colonoscopy (V45 89) (Z01 620)   · History of angina pectoris (V12 59) (Z86 79)   · History of arthritis (V13 4) (Z87 39)   · History of blurred vision (V12 49) (Z86 69)   · History of bronchitis (V12 69) (Z87 09)   · History of cataract (V12 49) (Z86 69)   · History of chest pain (V13 89) (Y62 388)   · History of depression (V11 8) (Z86 59)   · History of diabetes mellitus (V12 29) (Z86 39)   · History of DVT (deep vein thrombosis) (V12 51) (Z86 718)   · History of fibromyalgia (V13 59) (Z87 39)   · History of gastric ulcer (V12 79) (Z87 19)   · History of hypercholesterolemia (V12 29) (Z86 39)   · History of hypertension (V12 59) (Z86 79)   · History of mammogram (V15 89) (Z92 89)   · History of psoriasis (V13 3) (Z87 2)   · History of seasonal allergies (V15 09) (Z88 9)   · History of skin disorder (V13 3) (Z87 2)   · History of sleep disturbance (V13 89) (F03 115)   · History of thyroid disease (V12 29) (Z86 39)   · History of visual disturbance (V12 49) (Z86 69)   · History of Imbalance (781 2) (R26 89)   · History of Joint pain (719 40) (M25 50)   · History of Leg weakness, bilateral (729 89) (R29 898)   · History of Loss of smell (781 1) (R43 0)   · History of Lumbar radiculopathy (724 4) (M54 16)   · History of Nasal drainage (478 19) (J34 89)   · History of Neck stiffness (723 5) (M43 6)   · History of Need for prophylactic vaccination and inoculation against influenza (V04 81)  (Z23)   · History of Palpitations (785 1) (R00 2)   · History of Sinus pain (478 19) (J34 89)   · History of Ventral hernia (553 20) (K43 9)   · History of Wears eyeglasses (V49 89) (Z97 3)    The active problems and past medical history were reviewed and updated today  Surgical History    · History of Back Surgery   · History of Cataract Surgery   · History of Cholecystectomy   · History of Coronary Artery Surgery   · History of Gallbladder Surgery   · History of Neuroplasty Median Nerve At Carpal Tunnel   · History of Tonsillectomy With Adenoidectomy   · History of Total Abdominal Hysterectomy With Bilateral Salpingo-Oophorectomy   · History of Umbilical Hernia Repair    The surgical history was reviewed and updated today         Family History  Mother    · Family history of Anxiety   · Family history of arthritis (V17 7) (Z82 61)   · Family history of leukemia (V16 6) (Z80 6)   · Family history of Major depressive disorder, recurrent episode with atypical features  Father    · Family history of Coronary Artery Disease (V17 49)   · Family history of Diabetes Mellitus (V18 0)   · Family history of infectious disease (V18 8) (Z83 1)   · Family history of Heart problem  Daughter    · Family history of Anxiety   · Family history of Major depressive disorder, recurrent episode with atypical features  Grandparent    · Family history of alcohol abuse (V61 41) (Z81 1)  Maternal Grandmother    · Family history of Alzheimer's disease (V17 2) (Z82 0)  Maternal Grandfather    · Family history of Heart problem  Family History    · Family history of Cancer   · Family history of Diabetes Mellitus (V18 0)   · Family history of Hypertension (V17 49)   · Family history of Reported Prior Back Trouble   · Family history of Thyroid Disorder (V18 19)    The family history was reviewed and updated today  Social History    · Denied: Alcohol Use (History)   · Denied: History of Currently sexually active   · Denied: History of Drug Use   · Denied: Exercising Regularly   · Former smoker (V15 82) (V59 441)   · Smoked about a half a pack for about 4 years  Quit about 50 years ago  · High school or GED   · Lives independently with spouse   ·    · No alcohol use   · No drug use   · No known risk factors   · No known STD risk factors   · Retired   · Two children  The social history was reviewed and updated today  The social history was reviewed and is unchanged  Current Meds   1  Acebutolol HCl - 200 MG Oral Capsule; take 1 capsule daily; Therapy: 60KKQ9012 to Recorded   2  ALPRAZolam 0 5 MG Oral Tablet; TAKE 1 TABLET 3 times daily PRN; Therapy: 33QRG1348 to (96 560514)  Requested for: 58YND2358; Last   Rx:40Sva2116 Ordered   3  AmLODIPine Besylate 2 5 MG Oral Tablet; take 2 tablet daily; Therapy: (Recorded:32Dtz2677) to Recorded   4  Aspirin 81 MG TABS; Take 1 tablet daily; Therapy: (Recorded:62Kbd3356) to Recorded   5  BD Pen Needle Clover U/F 32G X 4 MM Miscellaneous; use as directed; Therapy: 95MMO3334 to (Evaluate:41Huo2683)  Requested for: 24Ykr7616 Recorded   6  CoQ-10 100 MG Oral Capsule; TAKE 2 CAPSULE Twice daily; Therapy: (Gia Virk) to Recorded   7  Cymbalta 60 MG Oral Capsule Delayed Release Particles; TAKE 1 CAPSULE DAILY; Therapy: 69ORA0253 to (Evaluate:10Mar2016) Recorded   8   Enalapril Maleate 10 MG Oral Tablet; Take 1 tablet daily; Therapy: 53IVQ2353 to  Requested for: 75RCE9447 Recorded   9  Enoxaparin Sodium 40 MG/0 4ML Subcutaneous Solution; Administer 40mg SQ daily for   28 days after surgery; Therapy: 79JWM3696 to (Evaluate:06Gjo9443); Last Rx:94Yuz3084 Ordered   10  Lantus SoloStar 100 UNIT/ML Subcutaneous Solution Pen-injector; INJECT 40 UNIT    Every AM and PM;    Therapy: 83Tdx0254 to (Evaluate:94Kcg7296)  Requested for: 12KMB9110 Recorded   11  Lipitor 40 MG Oral Tablet; TAKE 1 TABLET DAILY; Therapy: 78EHG9635 to (Evaluate:24Oov2344) Recorded   12  MetFORMIN HCl  MG Oral Tablet Extended Release 24 Hour; TAKE 1 TABLET    TWICE DAILY -TAKE WITH A MEAL OR FOOD; Therapy: 99COZ9480 to (Lluvia Maciel)  Requested for: 45UHZ7799; Last    Rx:13Gcv4025 Ordered   13  NovoLOG 100 UNIT/ML Subcutaneous Solution; INJECT 4 UNITS TO 14 UNITS    SUBCUTANEOUSLY THREE TIMES DAILY IN THE MORNING, WITH LUNCH, AND    WITH DINNER (DIABETES MELLITUS) (ROTATE SITES); Therapy: 87XHJ2365 to (Evaluate:68Wna1214)  Requested for: 37Yyk7234 Recorded   14  ProAir  (90 Base) MCG/ACT Inhalation Aerosol Solution; INHALE 2 PUFFS    EVERY 4-6 HOURS AS NEEDED; Therapy: 55ZSA1537 to (PZONXNYV:13LDB6317)  Requested for: 56Fgz3609; Last    Rx:47Gtp4359 Ordered   15  Synthroid 50 MCG Oral Tablet; TAKE 1 TABLET DAILY AS DIRECTED; Therapy: 22BPR5119 to (Evaluate:33Dsp4269)  Requested for: 23WGB2781 Recorded   16  TiZANidine HCl - 2 MG Oral Capsule; 1 or 2 TABLETS AT BEDTIME; Therapy: 11Fde1973 to Recorded   17  Vitamin D3 2000 UNIT Oral Capsule; TAKE 1 CAPSULE ONCE DAILY; Therapy: (Recorded:84Tdd3227) to Recorded    The medication list was reviewed and updated today  Allergies    1  No Known Drug Allergies    2  Seasonal    Immunizations   ** Printed in Appendix #1 below       Vitals  Signs    Heart Rate: 63  Respiration: 16  Systolic: 421, RUE, Sitting  Diastolic: 58, RUE, Sitting  Height: 5 ft 1 in  Weight: 207 lb 0 2 oz  BMI Calculated: 39 11  BSA Calculated: 1 92  O2 Saturation: 97    Physical Exam    Constitutional   General appearance: No acute distress, well appearing and well nourished  Head and Face   Head and face: Normal     Eyes   Conjunctiva and lids: No swelling, erythema or discharge  Pupils and irises: Abnormal   cataract  Ears, Nose, Mouth, and Throat   External inspection of ears and nose: Normal     Otoscopic examination: Tympanic membranes translucent with normal light reflex  Canals patent without erythema  Hearing: Normal     Nasal mucosa, septum, and turbinates: Normal without edema or erythema  Lips, teeth, and gums: Normal, good dentition  Oropharynx: Normal with no erythema, edema, exudate or lesions  Neck   Neck: Supple, symmetric, trachea midline, no masses  Pulmonary   Respiratory effort: No increased work of breathing or signs of respiratory distress  Auscultation of lungs: Clear to auscultation  Cardiovascular   Auscultation of heart: Normal rate and rhythm, normal S1 and S2, no murmurs  Pedal pulses: 2+ bilaterally  Examination of extremities for edema and/or varicosities: Normal     Abdomen   Abdomen: Non-tender, no masses  Liver and spleen: No hepatomegaly or splenomegaly  Psychiatric   Mood and affect: Abnormal   Mood and Affect: appropriate mood, anxious and depressed        Results/Data  Falls Risk Assessment (Dx Z13 89 Screen for Neurologic Disorder) 99GLT3869 01:36PM User, Nuria     Test Name Result Flag Reference   Falls Risk      No falls in the past year     (1) HEMOGLOBIN A1C 37OXT6879 10:59AM Christina Ibarra     Test Name Result Flag Reference   HEMOGLOBIN A1C 8 6       Summary / No summary entered :      No summary entered  Documents attached :      Results Note (Results Note) - Christina Alarcon Lucas Baggs: 69NHS8569 - Chart Update - -      Ariel Nice) (Additional Information Document)  (1) HEMOGLOBIN A1C 73VIK8925 10:59AM Bridget Whiting     Test Name Result Flag Reference   HEMOGLOBIN A1C 8 6       Summary / No summary entered :      No summary entered  Documents attached :      Results Note (Results Note) - Bridget Whiting; Enc: 71DYT7185 - Chart Update - -      Ean Bravo) (Additional Information Document)  (1) COMPREHENSIVE METABOLIC PANEL 53OJQ6739 65:62XT Bridget Whiting     Test Name Result Flag Reference   GLUCOSE 105 mg/dL H 65-99   Fasting reference interval     For someone without known diabetes, a glucose value  between 100 and 125 mg/dL is consistent with  prediabetes and should be confirmed with a  follow-up test    UREA NITROGEN (BUN) 14 mg/dL  7-25   CREATININE 1 18 mg/dL H 0 60-0 93   For patients >52years of age, the reference limit  for Creatinine is approximately 13% higher for people  identified as -American  eGFR NON-AFR  AMERICAN 47 mL/min/1 73m2 L > OR = 60   eGFR AFRICAN AMERICAN 54 mL/min/1 73m2 L > OR = 60   BUN/CREATININE RATIO 12 (calc)  6-22   SODIUM 138 mmol/L  135-146   POTASSIUM 4 8 mmol/L  3 5-5 3   CHLORIDE 103 mmol/L     CARBON DIOXIDE 28 mmol/L  20-31   CALCIUM 9 8 mg/dL  8 6-10 4   PROTEIN, TOTAL 7 6 g/dL  6 1-8 1   ALBUMIN 4 2 g/dL  3 6-5 1   GLOBULIN 3 4 g/dL (calc)  1 9-3 7   ALBUMIN/GLOBULIN RATIO 1 2 (calc)  1 0-2 5   BILIRUBIN, TOTAL 0 5 mg/dL  0 2-1 2   ALKALINE PHOSPHATASE 109 U/L     AST 20 U/L  10-35   ALT 16 U/L  6-29     (1) LIPID PANEL, FASTING 29KUR3082 08:45AM Bridget Whiting     Test Name Result Flag Reference   CHOLESTEROL, TOTAL 161 mg/dL  <200   HDL CHOLESTEROL 44 mg/dL L >97   TRIGLICERIDES 935 mg/dL  <150   LDL-CHOLESTEROL 93 mg/dL (calc)     Reference range: <100     Desirable range <100 mg/dL for patients with CHD or  diabetes and <70 mg/dL for diabetic patients with  known heart disease       LDL-C is now calculated using the Roger-Becerra   calculation, which is a validated novel method providing   better accuracy than the [employer] equation in the   estimation of LDL-C  Gabby Schmidt  Dong Frost  4994;914(18): 1692-5929   (http://InGameNow/faq/AAT523)   CHOL/HDLC RATIO 3 7 (calc)  <5 0   NON HDL CHOLESTEROL 117 mg/dL (calc)  <130   For patients with diabetes plus 1 major ASCVD risk   factor, treating to a non-HDL-C goal of <100 mg/dL   (LDL-C of <70 mg/dL) is considered a therapeutic   option  (Q) CBC (H/H, RBC, INDICES, WBC, PLT) 51JGF8005 08:45AM Mariama Morgan     Test Name Result Flag Reference   WHITE BLOOD CELL COUNT 8 7 Thousand/uL  3 8-10 8   RED BLOOD CELL COUNT 3 93 Million/uL  3 80-5 10   HEMOGLOBIN 10 6 g/dL L 11 7-15 5   HEMATOCRIT 33 6 % L 35 0-45 0   MCV 85 5 fL  80 0-100 0   MCH 27 0 pg  27 0-33 0   MCHC 31 5 g/dL L 32 0-36 0   RDW 15 2 % H 11 0-15 0   PLATELET COUNT 341 Thousand/uL  140-400    (Report)     WE RECEIVED YOUR HANDWRITTEN TEST ORDER AND  PERFORMED A HEMOGRAM WITH A PLATELET WITHOUT  A DIFFERENTIAL  IF THIS IS NOT WHAT YOU INTENDED  TO ORDER, PLEASE CONTACT YOUR LOCAL CLIENT SERVICE  REPRESENTATIVE IMMEDIATELY SO THAT WE CAN   ADJUST OUR BILLING APPROPRIATELY  YOU MAY ALSO   INQUIRE ABOUT ALTERNATIVE OR ADDITIONAL TESTING  MPV 11 1 fL  7 5-12 5     (Q) HEMOGLOBIN A1c 27RLY7072 08:45AM Mariama Morgan   REPORT COMMENT:  2 REQS  FASTING:YES PATIENT UNABLE TO VOID; ADVISED TO RETURN FOR CO     Test Name Result Flag Reference   HEMOGLOBIN A1c 8 6 % of total Hgb H <5 7   For someone without known diabetes, a hemoglobin A1c  value of 6 5% or greater indicates that they may have   diabetes and this should be confirmed with a follow-up   test      For someone with known diabetes, a value <7% indicates   that their diabetes is well controlled and a value   greater than or equal to 7% indicates suboptimal   control  A1c targets should be individualized based on   duration of diabetes, age, comorbid conditions, and   other considerations       Currently, no consensus exists regarding use of  hemoglobin A1c for diagnosis of diabetes for children  Health Management  Colon cancer screening   COLONOSCOPY; every 5 years; Last 74WHV7367; Next Due: 39ETB8360; Active  Encounter for screening mammogram for breast cancer   Digital Bilateral Screening Mammogram With CAD; every 1 year; Next Due: 57NGE4586; Overdue  Health Maintenance   Medicare Annual Wellness Visit; every 1 year; Last 49PBR5235; Next Due: 64Vvh6821;   Active    Future Appointments    Date/Time Provider Specialty Site   2017 11:30 AM Renetta Neff LCSW  [facility] [address] [facility]   2018 01:30 PM Cherry Uriostegui DO [facility]     Signatures   Electronically signed by : Spencer Boyd DO; 2017  8:27PM EST                       (Author)    Appendix #1     Patient: Josh Baron; : 1945; MRN: [id number]      1 2 3 4 5 6    Influenza  20-Oct-2012 05-Oct-2013 16-Sep-2014 07-Oct-2015 01-Dec-2016 02-Oct-2017    PCV  17-Dec-2014

## 2018-01-12 VITALS
DIASTOLIC BLOOD PRESSURE: 78 MMHG | SYSTOLIC BLOOD PRESSURE: 162 MMHG | BODY MASS INDEX: 38.71 KG/M2 | OXYGEN SATURATION: 97 % | WEIGHT: 205.04 LBS | HEART RATE: 73 BPM | HEIGHT: 61 IN | RESPIRATION RATE: 20 BRPM

## 2018-01-12 NOTE — RESULT NOTES
Message   Notify the patient the blood sugar is stable 134, there is a mild elevation of the alkaline phosphatase and a reduction of the GFR which I will discuss with you at the next follow-up visit  Verified Results  (1) VITAMIN B12 [id number] 02:54PM [facility] Order Number: [id number]     Test Name Result Flag Reference   VITAMIN B12 262 pg/mL  100-900     (1) VITAMIN D 25-HYDROXY 29Gmz5359 02:54PM [facility] Order Number: [id number]     Test Name Result Flag Reference   VIT D 25-HYDROX 34 0 ng/mL  30 0-100 0   This assay is a certified procedure of the CDC Vitamin D Standardization Certification Program (VDSCP)     Deficiency <20ng/ml   Insufficiency 20-30ng/ml   Sufficient  ng/ml     *Patients undergoing fluorescein dye angiography may retain small amounts of fluorescein in the body for 48-72 hours post procedure  Samples containing fluorescein can produce falsely elevated Vitamin D values  If the patient had this procedure, a specimen should be resubmitted post fluorescein clearance  (1) CBC/ PLT (NO DIFF) 29Sep2016 02:54PM [facility] Order Number: [id number]     Test Name Result Flag Reference   HEMATOCRIT 36 6 %  34 8-46 1   HEMOGLOBIN 11 6 g/dL  11 5-15 4   MCHC 31 7 g/dL  31 4-37 4   MCH 27 1 pg  26 8-34 3   MCV 86 fL  82-98   PLATELET COUNT 807 Thousands/uL  149-390   RBC COUNT 4 28 Million/uL  3 81-5 12   RDW 14 9 %  11 6-15 1   WBC COUNT 8 01 Thousand/uL  4 31-10 16   MPV 11 9 fL  8 9-12 7     (1) COMPREHENSIVE METABOLIC PANEL 00FZW5993 45:22KS Maxim Ozuna    Order Number: [id number]     Test Name Result Flag Reference   GLUCOSE,RANDM 134 mg/dL     If the patient is fasting, the ADA then defines impaired fasting glucose as > 100 mg/dL and diabetes as > or equal to 123 mg/dL     SODIUM 138 mmol/L  136-145   POTASSIUM 4 4 mmol/L  3 5-5 3   CHLORIDE 101 mmol/L  100-108   CARBON DIOXIDE 28 mmol/L  21-32   ANION GAP (CALC) 9 mmol/L  4-13   BLOOD UREA NITROGEN 12 mg/dL  5-25   CREATININE 1 27 mg/dL  0 60-1 30   Standardized to IDMS reference method   CALCIUM 9 2 mg/dL  8 3-10 1   BILI, TOTAL 0 40 mg/dL  0 20-1 00   ALK PHOSPHATAS 130 U/L H    ALT (SGPT) 37 U/L  12-78   AST(SGOT) 36 U/L  5-45   ALBUMIN 3 5 g/dL  3 5-5 0   TOTAL PROTEIN 8 1 g/dL  6 4-8 2   eGFR Non-African American 41 7 ml/min/1 73sq [facility] Disease Education Program recommendations are as follows:  GFR calculation is accurate only with a steady state creatinine  Chronic Kidney disease less than 60 ml/min/1 73 sq  meters  Kidney failure less than 15 ml/min/1 73 sq  meters         Signatures   Electronically signed by : Devan Tapia DO; Sep 30 2016 12:05PM EST                       (Author)

## 2018-01-13 VITALS
HEART RATE: 71 BPM | WEIGHT: 200.03 LBS | SYSTOLIC BLOOD PRESSURE: 140 MMHG | DIASTOLIC BLOOD PRESSURE: 62 MMHG | OXYGEN SATURATION: 98 % | HEIGHT: 61 IN | BODY MASS INDEX: 37.76 KG/M2 | RESPIRATION RATE: 16 BRPM

## 2018-01-13 VITALS
WEIGHT: 209.38 LBS | BODY MASS INDEX: 39.53 KG/M2 | SYSTOLIC BLOOD PRESSURE: 122 MMHG | HEIGHT: 61 IN | HEART RATE: 72 BPM | RESPIRATION RATE: 18 BRPM | DIASTOLIC BLOOD PRESSURE: 60 MMHG

## 2018-01-13 VITALS
DIASTOLIC BLOOD PRESSURE: 76 MMHG | TEMPERATURE: 98.3 F | SYSTOLIC BLOOD PRESSURE: 130 MMHG | HEART RATE: 76 BPM | RESPIRATION RATE: 16 BRPM | WEIGHT: 206 LBS | BODY MASS INDEX: 38.89 KG/M2 | HEIGHT: 61 IN

## 2018-01-13 NOTE — RESULT NOTES
Message   Notify the patient with CBC does show anemia hemoglobin is 10 6 back please have the patient go for iron level, ferritin, TIBC and stool cards x3; please have the pt follow up with me to discuss as scheduled   Notify the patient next elevation of the hemoglobin A1c 8 6 suboptimal control please have the pt follow up with me to discuss as scheduled        Verified Results  (1) COMPREHENSIVE METABOLIC PANEL [id number] 81:84EU Roman Duran     Test Name Result Flag Reference   GLUCOSE 105 mg/dL H 65-99   Fasting reference interval     For someone without known diabetes, a glucose value  between 100 and 125 mg/dL is consistent with  prediabetes and should be confirmed with a  follow-up test    UREA NITROGEN (BUN) 14 mg/dL  7-25   CREATININE 1 18 mg/dL H 0 60-0 93   For patients >52years of age, the reference limit  for Creatinine is approximately 13% higher for people  identified as -American  eGFR NON-AFR  AMERICAN 47 mL/min/1 73m2 L > OR = 60   eGFR AFRICAN AMERICAN 54 mL/min/1 73m2 L > OR = 60   BUN/CREATININE RATIO 12 (calc)  6-22   SODIUM 138 mmol/L  135-146   POTASSIUM 4 8 mmol/L  3 5-5 3   CHLORIDE 103 mmol/L     CARBON DIOXIDE 28 mmol/L  20-31   CALCIUM 9 8 mg/dL  8 6-10 4   PROTEIN, TOTAL 7 6 g/dL  6 1-8 1   ALBUMIN 4 2 g/dL  3 6-5 1   GLOBULIN 3 4 g/dL (calc)  1 9-3 7   ALBUMIN/GLOBULIN RATIO 1 2 (calc)  1 0-2 5   BILIRUBIN, TOTAL 0 5 mg/dL  0 2-1 2   ALKALINE PHOSPHATASE 109 U/L     AST 20 U/L  10-35   ALT 16 U/L  6-29     (1) LIPID PANEL, FASTING [id number] 08:45AM Roman Duran     Test Name Result Flag Reference   CHOLESTEROL, TOTAL 161 mg/dL  <200   HDL CHOLESTEROL 44 mg/dL L >67   TRIGLICERIDES 275 mg/dL  <150   LDL-CHOLESTEROL 93 mg/dL (calc)     Reference range: <100     Desirable range <100 mg/dL for patients with CHD or  diabetes and <70 mg/dL for diabetic patients with  known heart disease       LDL-C is now calculated using the [facility] calculation, which is a validated novel method providing   better accuracy than the Friedewald equation in the   estimation of LDL-C  Reanna Nugent  Yogesh Martinez  [id number](19): 1813-6637   (http://[employer]/faq/NBG395)   CHOL/HDLC RATIO 3 7 (calc)  <5 0   NON HDL CHOLESTEROL 117 mg/dL (calc)  <130   For patients with diabetes plus 1 major ASCVD risk   factor, treating to a non-HDL-C goal of <100 mg/dL   (LDL-C of <70 mg/dL) is considered a therapeutic   option  (Q) CBC (H/H, RBC, INDICES, WBC, PLT) [id number] 08:45AM Joy Spotted     Test Name Result Flag Reference   WHITE BLOOD CELL COUNT 8 7 Thousand/uL  3 8-10 8   RED BLOOD CELL COUNT 3 93 Million/uL  3 80-5 10   HEMOGLOBIN 10 6 g/dL L 11 7-15 5   HEMATOCRIT 33 6 % L 35 0-45 0   MCV 85 5 fL  80 0-100 0   MCH 27 0 pg  27 0-33 0   MCHC 31 5 g/dL L 32 0-36 0   RDW 15 2 % H 11 0-15 0   PLATELET COUNT 281 Thousand/uL  140-400    (Report)     WE RECEIVED YOUR HANDWRITTEN TEST ORDER AND  PERFORMED A HEMOGRAM WITH A PLATELET WITHOUT  A DIFFERENTIAL  IF THIS IS NOT WHAT YOU INTENDED  TO ORDER, PLEASE CONTACT YOUR LOCAL CLIENT SERVICE  REPRESENTATIVE IMMEDIATELY SO THAT WE CAN   ADJUST OUR BILLING APPROPRIATELY  YOU MAY ALSO   INQUIRE ABOUT ALTERNATIVE OR ADDITIONAL TESTING  MPV 11 1 fL  7 5-12 5     (Q) HEMOGLOBIN A1c [id number] 08:45AM Joy Spotted   REPORT COMMENT:  2 REQS  FASTING:YES PATIENT UNABLE TO VOID; ADVISED TO RETURN FOR CO     Test Name Result Flag Reference   HEMOGLOBIN A1c 8 6 % of total Hgb H <5 7   For someone without known diabetes, a hemoglobin A1c  value of 6 5% or greater indicates that they may have   diabetes and this should be confirmed with a follow-up   test      For someone with known diabetes, a value <7% indicates   that their diabetes is well controlled and a value   greater than or equal to 7% indicates suboptimal   control   A1c targets should be individualized based on   duration of diabetes, age, comorbid conditions, and   other considerations  Currently, no consensus exists regarding use of  hemoglobin A1c for diagnosis of diabetes for children

## 2018-01-13 NOTE — RESULT NOTES
Message   Notify the patient normal CBC follow up as scheduled        Verified Results  (1) VITAMIN B12 29Sep2016 02:54PM [facility] Order Number: [id number]     Test Name Result Flag Reference   VITAMIN B12 262 pg/mL  100-900     (1) VITAMIN D 25-HYDROXY 29Sep2016 02:54PM [facility] Order Number: [id number]     Test Name Result Flag Reference   VIT D 25-HYDROX 34 0 ng/mL  30 0-100 0   This assay is a certified procedure of the CDC Vitamin D Standardization Certification Program (VDSCP)     Deficiency <20ng/ml   Insufficiency 20-30ng/ml   Sufficient  ng/ml     *Patients undergoing fluorescein dye angiography may retain small amounts of fluorescein in the body for 48-72 hours post procedure  Samples containing fluorescein can produce falsely elevated Vitamin D values  If the patient had this procedure, a specimen should be resubmitted post fluorescein clearance       (1) CBC/ PLT (NO DIFF) 29Sep2016 02:54PM [facility] Order Number: [id number]     Test Name Result Flag Reference   HEMATOCRIT 36 6 %  34 8-46 1   HEMOGLOBIN 11 6 g/dL  11 5-15 4   MCHC 31 7 g/dL  31 4-37 4   MCH 27 1 pg  26 8-34 3   MCV 86 fL  82-98   PLATELET COUNT 502 Thousands/uL  149-390   RBC COUNT 4 28 Million/uL  3 81-5 12   RDW 14 9 %  11 6-15 1   WBC COUNT 8 01 Thousand/uL  4 31-10 16   MPV 11 9 fL  8 9-12 7       Signatures   Electronically signed by : Jayson Khoury DO; Sep 30 2016 12:04PM EST                       (Author)

## 2018-01-13 NOTE — PSYCH
Progress Note  Psychotherapy Provided [facility]:   Goals addressed in session:   (G#1 ) Deepa Carroll attended the first session of the Caregiver Support group today  An icebreaker exercise was conducted for each participant to be in pairs and to find three interesting facts about their partner  Each participant was asked to identify what each associates with caregiving  She expressed concern about balancing her personal life with her commitment to her mother  A: Eye contact was consistent with the participants and with the group facilitator  She verbally responded to participants' input as well as to the facilitator's input and did not have to be prompted to contribute to the discussion  She identified babysitting (any age) as a factor in caregiving and elaborated on the extensive time she spends not only caring for her 80 yr  old mother ("who cannot accept change") but also providing transportation for her grandchildren  She smiled during the session  She was able to share what her partner had identified as interesting facts about her  P: G#1) will continue with group participation to learn ways to create balance in her life;   Pain Scale and Suicide Risk [facility]: On a scale of 0 to 10, the patient rates current pain at 0   Current suicide risk is low   Behavioral Health Treatment Plan [facility]: Diagnosis and Treatment Plan explained to patient, patient relates understanding diagnosis and is agreeable to Treatment Plan  Assessment    1   Anxiety (300 00) (F41 9)    Signatures   Electronically signed by : RASHARD WilliamsonSWGEORGE,JIGAR; Jul 20 2016  8:17PM EST                       (Author)

## 2018-01-13 NOTE — RESULT NOTES
Message   notify pt the us of the abdomen does show a small bowel containing umbilical hernia - please have the pt see General Surgeon Dr Dahlia Moulton and f/u as scheduled        Verified Results  [address] [place] [address] [id number] 08:48AM [facility] Order Number: [id number]     Test Name Result Flag Reference   US ABDOMEN LIMITED (Report)     ANTERIOR ABDOMINAL WALL ULTRASOUND     INDICATION: Ventral hernia       COMPARISON: None  TECHNIQUE:  Real-time ultrasound of the anterior abdominal wall was performed with a linear transducer with both volumetric sweeps and still imaging techniques  FINDINGS: Umbilical hernia containing peristalsing bowel is detected with approximately 2 cm rent  No focal fluid collection noted in the anterior abdominal wall  IMPRESSION:     Small bowel containing umbilical hernia         Workstation performed: [id number]     Signed by:   Stalin Spencer MD   2/20/16       Signatures   Electronically signed by : Dellia Gosselin, DO; Feb 20 2016  2:51PM EST                       (Author)

## 2018-01-14 VITALS
WEIGHT: 207 LBS | DIASTOLIC BLOOD PRESSURE: 60 MMHG | BODY MASS INDEX: 39.08 KG/M2 | SYSTOLIC BLOOD PRESSURE: 138 MMHG | HEART RATE: 70 BPM | RESPIRATION RATE: 16 BRPM | HEIGHT: 61 IN

## 2018-01-14 VITALS
HEIGHT: 61 IN | WEIGHT: 207.01 LBS | RESPIRATION RATE: 16 BRPM | OXYGEN SATURATION: 97 % | HEART RATE: 63 BPM | DIASTOLIC BLOOD PRESSURE: 58 MMHG | SYSTOLIC BLOOD PRESSURE: 144 MMHG | BODY MASS INDEX: 39.08 KG/M2

## 2018-01-14 VITALS
HEIGHT: 61 IN | DIASTOLIC BLOOD PRESSURE: 70 MMHG | BODY MASS INDEX: 38.51 KG/M2 | WEIGHT: 204 LBS | RESPIRATION RATE: 14 BRPM | HEART RATE: 64 BPM | SYSTOLIC BLOOD PRESSURE: 144 MMHG | TEMPERATURE: 99 F

## 2018-01-14 VITALS
HEIGHT: 61 IN | SYSTOLIC BLOOD PRESSURE: 130 MMHG | WEIGHT: 201.04 LBS | HEART RATE: 77 BPM | TEMPERATURE: 97.7 F | RESPIRATION RATE: 16 BRPM | DIASTOLIC BLOOD PRESSURE: 64 MMHG | BODY MASS INDEX: 37.96 KG/M2

## 2018-01-14 VITALS
BODY MASS INDEX: 38.71 KG/M2 | TEMPERATURE: 98.6 F | SYSTOLIC BLOOD PRESSURE: 158 MMHG | RESPIRATION RATE: 16 BRPM | DIASTOLIC BLOOD PRESSURE: 64 MMHG | HEART RATE: 76 BPM | WEIGHT: 205 LBS | HEIGHT: 61 IN

## 2018-01-14 NOTE — PSYCH
Message  Message Free Text Note Form: She lm via [employer] to cancel her Caregiver Support Group session fro 1/18/17 from 1 PM - 2 PM       Active Problems    1  Abdominal pain (789 00) (R10 9)   2  Abdominal pain, epigastric (789 06) (R10 13)   3  Abnormal blood chemistry (790 6) (R79 9)   4  Abrasion (919 0) (T14 8)   5  Abrasion of arm, left (913 0) (S40 812A)   6  Acute deep vein thrombosis of lower extremity (453 40) (I82 409)   7  Acute upper respiratory infection (465 9) (J06 9)   8  Adenomatous polyp of colon (211 3) (D12 6)   9  Anemia (285 9) (D64 9)   10  Anosmia (781 1) (R43 0)   11  Antral ulcer (531 90) (K25 9)   12  Anxiety (300 00) (F41 9)   13  Asthma (493 90) (J45 909)   14  Atypical chest pain (786 59) (R07 89)   15  Balance problems (781 99) (R26 89)   16  Benign essential hypertension (401 1) (I10)   17  Bezoars (938)   18  Bronchitis, asthmatic (493 90) (J45 909)   19  Cervical disc disease (722 91) (M50 90)   20  Cervical disc herniation (722 0) (M50 20)   21  Cervical radiculopathy (723 4) (M54 12)   22  Cervical radiculopathy (723 4) (M54 12)   23  Cervical spinal stenosis (723 0) (M48 02)   24  Cervical spinal stenosis (723 0) (M48 02)   25  Cervical spinal stenosis (723 0) (M48 02)   26  Cervical strain (847 0) (S16 1XXA)   27  Chronic mastoiditis (383 1) (H70 10)   28  Chronic renal insufficiency (585 9) (N18 9)   29  Colon cancer screening (V76 51) (Z12 11)   30  Contusion of skin with intact surface (924 9) (T14 8)   31  Depression (311) (F32 9)   32  Depression with anxiety (300 4) (F41 8)   33  Diabetic polyneuropathy associated with type 2 diabetes mellitus (250 60,357 2)    (E11 42)   34  Diarrhea (787 91) (R19 7)   35  Disc degeneration, lumbar (722 52) (M51 36)   36  Dizziness (780 4) (R42)   37  DM2 (diabetes mellitus, type 2) (250 00) (E11 9)   38  Dyspepsia (536 8) (K30)   39  Early satiety (780 94) (R68 81)   40  Elevated C-reactive protein (790 95) (R79 82)   41   Encounter for screening for osteoporosis (V82 81) (Z13 820)   42  Encounter for screening mammogram for breast cancer (V76 12) (Z12 31)   43  Falls (E888 9) (W19 XXXA)   44  Fatigue (780 79) (R53 83)   45  Flushing (782 62) (R23 2)   46  Headache (784 0) (R51)   47  History of allergy (V15 09) (Z88 9)   48  Hyperlipidemia (272 4) (E78 5)   49  Hypomagnesemia (275 2) (E83 42)   50  Influenza vaccination administered during current admission (V04 81) (Z23)   51  Initial Medicare annual wellness visit (V70 0) (Z00 00)   52  Low vitamin B12 level (266 2) (E53 8)   53  Lumbar radiculopathy (724 4) (M54 16)   54  Macular Retinal Edema Of Both Eyes (362 83)   55  Microalbuminuria (791 0) (R80 9)   56  Multiple joint pain (719 49) (M25 50)   57  Myofascial pain syndrome (729 1) (M79 1)   58  Neck pain (723 1) (M54 2)   59  Need for pneumococcal vaccination (V03 82) (Z23)   60  Need for prophylactic vaccination and inoculation against influenza (V04 81) (Z23)   61  History of Need for prophylactic vaccination and inoculation against influenza (V04 81)    (Z23)   62  Pain in wrist, unspecified laterality (719 43) (M25 539)   63  Palpitations (785 1) (R00 2)   64  Panic attack (300 01) (F41 0)   65  Post-nasal drip (784 91) (R09 82)   66  Pounding heartbeat (785 1) (R00 2)   67  Preoperative clearance (V72 84) (Z01 818)   68  Primary hypothyroidism (244 9) (E03 9)   69  Psoriatic arthropathy (696 0) (L40 50)   70  Right knee pain (719 46) (M25 561)   71  Screening for genitourinary condition (V81 6) (Z13 89)   72  Screening for neurological condition (V80 09) (Z13 89)   73  Sinusitis (473 9) (J32 9)   74  Spinal stenosis (724 00) (M48 00)   75  Spondylosis of cervical region without myelopathy or radiculopathy (721 0) (M47 812)   76  Spondylosis of lumbar region without myelopathy or radiculopathy (721 3) (M47 816)   77  Umbilical hernia (215 4) (K42 9)   78  Urinary frequency (788 41) (R35 0)   79  Ventral hernia (553 20) (K43 9)   80  Visit for pre-operative examination (V72 84) (Z01 818)   81  Vitamin B12 deficiency (266 2) (E53 8)   82  Vitamin D deficiency (268 9) (E55 9)   83  Vitreo-retinal adhesions (379 29) (H43 89)    Current Meds   1  ALPRAZolam 0 5 MG Oral Tablet; TAKE 1 TABLET 3 times daily PRN; Therapy: 88XNI9827 to (Evaluate:02Jan2017)  Requested for: 22RMO2581; Last   Rx:04Oct2016 Ordered   2  AmLODIPine Besylate 2 5 MG Oral Tablet; take 2 tablet daily; Therapy: (Recorded:10Xrn8912) to Recorded   3  Aspirin 81 MG TABS; Take 1 tablet daily; Therapy: (Recorded:21Apr2015) to Recorded   4  Atenolol 25 MG Oral Tablet; take 0 5 tablet daily; Therapy: (Recorded:20Apr2015) to Recorded   5  BD Pen Needle Clover U/F 32G X 4 MM Miscellaneous; use as directed; Therapy: 37OFQ5696 to (Evaluate:13Mea7047)  Requested for: 20Apr2015 Recorded   6  CoQ-10 100 MG Oral Capsule; TAKE 2 CAPSULE Twice daily; Therapy: (Recorded:30Nov2015) to Recorded   7  Cymbalta 60 MG Oral Capsule Delayed Release Particles (DULoxetine HCl); TAKE 1   CAPSULE DAILY; Therapy: 22DSD4758 to (Evaluate:10Mar2016) Recorded   8  Enalapril Maleate 10 MG Oral Tablet; TAKE TABLET  TAKE 10 MG IN AM, 10 MG IN PM;   Therapy: 57HVZ8876 to  Requested for: 60XDM6487 Recorded   9  Lantus SoloStar 100 UNIT/ML Subcutaneous Solution Pen-injector; INJECT 40 UNIT   Every AM and PM;   Therapy: 20Mar2012 to (Evaluate:74Ybk4488)  Requested for: 67SGM7144 Recorded   10  Lipitor 40 MG Oral Tablet (Atorvastatin Calcium); TAKE 1 TABLET DAILY; Therapy: 22UFT0204 to (Evaluate:18Oct2015) Recorded   11  MetFORMIN HCl  MG Oral Tablet Extended Release 24 Hour; TAKE 1 TABLET    TWICE DAILY -TAKE WITH A MEAL OR FOOD; Therapy: 24IIB9718 to (Cathy Cabrera)  Requested for: 77JFF8327; Last    Rx:81Bit9435 Ordered   12   NovoLOG 100 UNIT/ML Subcutaneous Solution; INJECT 4 UNITS TO 14 UNITS    SUBCUTANEOUSLY THREE TIMES DAILY IN THE MORNING, WITH LUNCH, AND WITH    DINNER (DIABETES MELLITUS) (ROTATE SITES); Therapy: 73JHI0594 to (Evaluate:34Wvm0435)  Requested for: 21Apr2015 Recorded   13  ProAir  (90 Base) MCG/ACT Inhalation Aerosol Solution; INHALE 2 PUFFS    EVERY 4-6 HOURS AS NEEDED; Therapy: 77DIN5402 to (QVKZNMVV:02YEK3774)  Requested for: 80Gmb1494; Last    Rx:65Elj7889 Ordered   14  Synthroid 50 MCG Oral Tablet (Levothyroxine Sodium); TAKE 1 TABLET DAILY AS    DIRECTED; Therapy: 27URU0660 to (Evaluate:14Juk2899)  Requested for: 14RLZ3118 Recorded   15  TraMADol HCl - 50 MG Oral Tablet; TAKE TABLET  ONE DAILY AS NEEDED FOR PAIN;    Therapy: (Recorded:30Nov2015) to Recorded   16  Vitamin D3 2000 UNIT Oral Capsule; TAKE 1 CAPSULE ONCE DAILY; Therapy: (Recorded:21Apr2015) to Recorded   17  Voltaren 1 % Transdermal Gel (Diclofenac Sodium); APPLY TO LOWER EXTREMITIES, 4    GM OF GEL TO AFFECTED AREA 4 TIMES DAILY  DO NOT APPLY MORE    THAN 16 GM DAILY TO ANY ONE AFFECTED JOINT; Therapy: 88HWZ8870 to (Evaluate:30Jan2015)  Requested for: 18Rvi3185; Last    Rx:07Vec7960 Ordered    Allergies    1  No Known Drug Allergies    2   Seasonal    Signatures   Electronically signed by : HALEIGH Schaefer,MARIANGELW; Jan 17 2017  8:53AM EST                       (Author)

## 2018-01-14 NOTE — PSYCH
Progress Note  Psychotherapy Provided [facility]: Group Therapy provided today  Goals addressed in session:   (G#1) Elmo Condon attended the Caregiver Support Group today  She noted her mother has been living with them for 17 yrs  and stated, "She is destroying our marriage " She commented on the difference between choosing and "having to" regarding care of her mother  "I have to  I am the only child " She commented that her cardiologist noted she will need to plan for other living arrangements for her mother as the reported stress level is affecting Yajaira's health  "How much of my life do I owe my mother?" discussed dynamics of individuals ho abilities are diminished and implications for personal empowerment; discussed boundaries regarding differentiating who is responsible for what; discussed self-care as first in importance regarding quality of life; She noted her experiencing panic attacks within the past two years; A: Eye contact was consistent with the participants and with the group facilitator  She verbally responded to participants' input as well as to the facilitator's input without prompting  She smiled as well as laughed during the session  P: G#1) will continue with group participation to help her with coping skills,decision-making and guilt-management;   Pain Scale and Suicide Risk [facility]: On a scale of 0 to 10, the patient rates current pain at 0   Current suicide risk is low   Behavioral Health Treatment Plan [address] [place] [address] [address]: Diagnosis and Treatment Plan explained to patient, patient relates understanding diagnosis and is agreeable to Treatment Plan  Assessment    1   Anxiety (300 00) (F41 9)    Signatures   Electronically signed by : Janette Paz, MSWLCSWMSOFE,JIGAR; Nov 16 2016  3:12PM EST                       (Author)

## 2018-01-15 VITALS
BODY MASS INDEX: 38.51 KG/M2 | OXYGEN SATURATION: 95 % | TEMPERATURE: 98 F | RESPIRATION RATE: 16 BRPM | DIASTOLIC BLOOD PRESSURE: 60 MMHG | SYSTOLIC BLOOD PRESSURE: 130 MMHG | HEIGHT: 61 IN | HEART RATE: 61 BPM | WEIGHT: 204 LBS

## 2018-01-15 NOTE — PSYCH
Message  Message Free Text Note Form: lm today to cancel her Caregiver Group session for today at 1 PM due to inclement weather; Active Problems    1  Abdominal pain (789 00) (R10 9)   2  Abdominal pain, epigastric (789 06) (R10 13)   3  Abnormal blood chemistry (790 6) (R79 9)   4  Abrasion (919 0) (T14 8)   5  Abrasion of arm, left (913 0) (S40 812A)   6  Acute deep vein thrombosis of lower extremity (453 40) (I82 409)   7  Acute upper respiratory infection (465 9) (J06 9)   8  Adenomatous polyp of colon (211 3) (D12 6)   9  Anemia (285 9) (D64 9)   10  Anosmia (781 1) (R43 0)   11  Antral ulcer (531 90) (K25 9)   12  Anxiety (300 00) (F41 9)   13  Asthma (493 90) (J45 909)   14  Atypical chest pain (786 59) (R07 89)   15  Balance problems (781 99) (R26 89)   16  Benign essential hypertension (401 1) (I10)   17  Bezoars (938)   18  Bronchitis, asthmatic (493 90) (J45 909)   19  Cervical disc disease (722 91) (M50 90)   20  Cervical disc herniation (722 0) (M50 20)   21  Cervical radiculopathy (723 4) (M54 12)   22  Cervical radiculopathy (723 4) (M54 12)   23  Cervical spinal stenosis (723 0) (M48 02)   24  Cervical spinal stenosis (723 0) (M48 02)   25  Cervical spinal stenosis (723 0) (M48 02)   26  Cervical strain (847 0) (S16 1XXA)   27  Chronic mastoiditis (383 1) (H70 10)   28  Chronic renal insufficiency (585 9) (N18 9)   29  Colon cancer screening (V76 51) (Z12 11)   30  Contusion of skin with intact surface (924 9) (T14 8)   31  Depression (311) (F32 9)   32  Depression with anxiety (300 4) (F41 8)   33  Diabetic polyneuropathy associated with type 2 diabetes mellitus (250 60,357 2)    (E11 42)   34  Diarrhea (787 91) (R19 7)   35  Disc degeneration, lumbar (722 52) (M51 36)   36  Dizziness (780 4) (R42)   37  DM2 (diabetes mellitus, type 2) (250 00) (E11 9)   38  Dyspepsia (536 8) (K30)   39  Early satiety (780 94) (R68 81)   40  Elevated C-reactive protein (790 95) (R79 82)   41   Encounter for screening for osteoporosis (V82 81) (Z13 820)   42  Encounter for screening mammogram for breast cancer (V76 12) (Z12 31)   43  Falls (E888 9) (W19 XXXA)   44  Fatigue (780 79) (R53 83)   45  Flushing (782 62) (R23 2)   46  Headache (784 0) (R51)   47  Heart palpitations (785 1) (R00 2)   48  History of allergy (V15 09) (Z88 9)   49  Hyperlipidemia (272 4) (E78 5)   50  Hypomagnesemia (275 2) (E83 42)   51  Influenza vaccination administered during current admission (V04 81) (Z23)   52  Initial Medicare annual wellness visit (V70 0) (Z00 00)   53  Low vitamin B12 level (266 2) (E53 8)   54  Lumbar radiculopathy (724 4) (M54 16)   55  Macular Retinal Edema Of Both Eyes (362 83)   56  Microalbuminuria (791 0) (R80 9)   57  Multiple joint pain (719 49) (M25 50)   58  Myofascial pain syndrome (729 1) (M79 1)   59  Neck pain (723 1) (M54 2)   60  Need for pneumococcal vaccination (V03 82) (Z23)   61  Need for prophylactic vaccination and inoculation against influenza (V04 81) (Z23)   62  History of Need for prophylactic vaccination and inoculation against influenza (V04 81)    (Z23)   63  Pain in wrist, unspecified laterality (719 43) (M25 539)   64  Palpitations (785 1) (R00 2)   65  Panic attack (300 01) (F41 0)   66  Post-nasal drip (784 91) (R09 82)   67  Pounding headache (784 0) (R51)   68  Pounding heartbeat (785 1) (R00 2)   69  Preoperative clearance (V72 84) (Z01 818)   70  Primary hypothyroidism (244 9) (E03 9)   71  Psoriatic arthropathy (696 0) (L40 50)   72  Right knee pain (719 46) (M25 561)   73  Screening for genitourinary condition (V81 6) (Z13 89)   74  Screening for neurological condition (V80 09) (Z13 89)   75  Shortness of breath (786 05) (R06 02)   76  Sinusitis (473 9) (J32 9)   77  Spinal stenosis (724 00) (M48 00)   78  Spondylosis of cervical region without myelopathy or radiculopathy (721 0) (M47 812)   79   Spondylosis of lumbar region without myelopathy or radiculopathy (721 3) (M47 816) 80  Umbilical hernia (160 1) (K42 9)   81  Urinary frequency (788 41) (R35 0)   82  Ventral hernia (553 20) (K43 9)   83  Visit for pre-operative examination (V72 84) (Z01 818)   84  Vitamin B12 deficiency (266 2) (E53 8)   85  Vitamin D deficiency (268 9) (E55 9)   86  Vitreo-retinal adhesions (379 29) (H43 89)    Current Meds   1  ALPRAZolam 0 5 MG Oral Tablet; TAKE 1 TABLET 3 times daily PRN; Therapy: 57NEZ8699 to (Evaluate:46Qbm7875)  Requested for: 78CVY7727; Last   Rx:02Mar2017 Ordered   2  AmLODIPine Besylate 2 5 MG Oral Tablet; take 2 tablet daily; Therapy: (Recorded:58Ajg7618) to Recorded   3  Aspirin 81 MG TABS; Take 1 tablet daily; Therapy: (Recorded:48Vii9988) to Recorded   4  Atenolol 25 MG Oral Tablet; take 0 5 tablet daily; Therapy: (Recorded:20Apr2015) to Recorded   5  BD Pen Needle Clover U/F 32G X 4 MM Miscellaneous; use as directed; Therapy: 67RCB9590 to (Evaluate:37Aqp0291)  Requested for: 20Apr2015 Recorded   6  CoQ-10 100 MG Oral Capsule; TAKE 2 CAPSULE Twice daily; Therapy: (Recorded:65Tcf2145) to Recorded   7  Cymbalta 60 MG Oral Capsule Delayed Release Particles (DULoxetine HCl); TAKE 1   CAPSULE DAILY; Therapy: 19LIZ9782 to (Evaluate:10Mar2016) Recorded   8  Enalapril Maleate 10 MG Oral Tablet; TAKE TABLET  TAKE 10 MG IN AM, 10 MG IN PM;   Therapy: 51HKI2811 to  Requested for: 38TMP2134 Recorded   9  Lantus SoloStar 100 UNIT/ML Subcutaneous Solution Pen-injector; INJECT 40 UNIT   Every AM and PM;   Therapy: 20Mar2012 to (Evaluate:72Imi8950)  Requested for: 35PZH5528 Recorded   10  Lipitor 40 MG Oral Tablet (Atorvastatin Calcium); TAKE 1 TABLET DAILY; Therapy: 65KRA2845 to (Evaluate:18Oct2015) Recorded   11  MetFORMIN HCl  MG Oral Tablet Extended Release 24 Hour; TAKE 1 TABLET    TWICE DAILY -TAKE WITH A MEAL OR FOOD; Therapy: 44TJK2169 to (Rozina Azaleal)  Requested for: 51PWV0979; Last    Rx:15Csz6017 Ordered   12   NovoLOG 100 UNIT/ML Subcutaneous Solution; INJECT 4 UNITS TO 14 UNITS    SUBCUTANEOUSLY THREE TIMES DAILY IN THE MORNING, WITH LUNCH, AND WITH    DINNER (DIABETES MELLITUS) (ROTATE SITES); Therapy: 12JWP3722 to (Evaluate:57Mwd3420)  Requested for: 21Apr2015 Recorded   13  ProAir  (90 Base) MCG/ACT Inhalation Aerosol Solution; INHALE 2 PUFFS    EVERY 4-6 HOURS AS NEEDED; [facility]: [facility]AQC6693 [facility]  Requested for: 16Sep2014; Last    Rx:67Rll0196 Ordered   14  Synthroid 50 MCG Oral Tablet (Levothyroxine Sodium); TAKE 1 TABLET DAILY AS    DIRECTED; Therapy: 30GZB5894 to (Evaluate:81Mso3845)  Requested for: 45RBR1290 Recorded   15  TraMADol HCl - 50 MG Oral Tablet; TAKE TABLET  ONE DAILY AS NEEDED FOR PAIN;    Therapy: (Recorded:30Nov2015) to Recorded   16  Vitamin D3 2000 UNIT Oral Capsule; TAKE 1 CAPSULE ONCE DAILY; Therapy: (Recorded:21Apr2015) to Recorded   17  Voltaren 1 % Transdermal Gel (Diclofenac Sodium); APPLY TO LOWER EXTREMITIES, 4    GM OF GEL TO AFFECTED AREA 4 TIMES DAILY  DO NOT APPLY MORE    THAN 16 GM DAILY TO ANY ONE AFFECTED JOINT; Therapy: 01LVY6569 to (Evaluate:30Jan2015)  Requested for: 16Sep2014; Last    Rx:43Rnk6186 Ordered    Allergies    1  No Known Drug Allergies    2   Seasonal    Signatures   Electronically signed by : RASHARD ThayerSWGEORGE,JIGAR; Mar 15 2017  9:53AM EST                       (Author)

## 2018-01-15 NOTE — PSYCH
Treatment Plan Tracking    #1 Treatment Plan not completed within required time limits due to: Other: Kiara Sears cancelled her Caregiver Support Group session for today at 1 PM            Signatures   Electronically signed by : HALEIGH Sands,JIGAR; Dec 21 2016  9:44AM EST                       (Author)

## 2018-01-15 NOTE — RESULT NOTES
Message   Recorded as Task   Date: 01/22/2016 08:15 AM, Created By: Emelia Bautista   Task Name: Follow Up   Assigned To: Havery Gaucher   Regarding Patient: Ridge Mccracken, Status: In Progress   Comment:    Hector De La Rosa - 22 Jan 2016 8:15 AM     TASK CREATED  S/P RIGHT C3-6 MBB-no pain diary in [employer]  No f/u scheduled  Has appt with DR Jacques Mcarthur 2-29-16  Madhuri De La RosaLou - 22 Jan 2016 2:05 PM     TASK REASSIGNED: Previously Assigned To [facility] goran procedure,Team  SIMONA left with female for pt to cb  RjHector - 22 Jan 2016 2:05 PM     TASK IN PROGRESS   Harvey Ruby - 27 Jan 2016 12:41 PM     TASK EDITED  Spoke to pt who reports 55% relief for several days after block and felt it was significant relief  Pt states now it is just discomfort, not pain  Pt states she did mail in pain diary     Edgardo Rios - 27 Jan 2016 1:19 PM     TASK REPLIED TO: Previously Assigned To Edgardo Rios  would recommend proceeding with RFA   Rosie Gann - 27 Jan 2016 2:04 PM     TASK REASSIGNED: Previously Assigned To [facility] Twila Kelly Havery Gaucher - 27 Jan 2016 3:17 PM     TASK REASSIGNED: Previously Assigned To [address] surgery sched,Team   Samira Mckeon - 28 Jan 2016 11:34 AM     TASK EDITED  Left message for patient to call back     ***need to schedule Right C3-C6 RFA***   Samira Mckeon - 28 Jan 2016 11:34 AM     TASK IN PROGRESS   Samira Mckeon - 29 Jan 2016 10:28 AM     TASK EDITED  S/W patient - scheduled patient for Tuesday, Feb 23 at [facility] with Dr Pedro Calzada - patient advised nothing to eat or drink 1 hour prior to procedure but encouraged to have a light breakfast - patient stated on low dose aspirin, no need to hold, patient denies any abx's - patient also advised to arrange for  - patient aware and agreed    Patient was also scheduled for 4 wk f/u post RFA for Monday, March 21        Signatures   Electronically signed by : Patricia Cedillo, ; Jan 29 2016 10:29AM EST (Author)

## 2018-01-15 NOTE — PSYCH
Treatment Plan Tracking    #1 Treatment Plan not completed within required time limits due to: Other: She canceled her Caregiver Group session for today at 1 PM due to inclement weather             Signatures   Electronically signed by : HALEIGH Carrington,JIGAR; Mar 15 2017 10:09AM EST                       (Author)

## 2018-01-15 NOTE — PSYCH
Progress Note  Psychotherapy Provided [facility]: Individual Psychotherapy 30 mins  minutes provided today  Goals addressed in session:   (G# 1 ) attended a 30 mins  "Meet and Greet" for her participation in the [facility]; "My mother (age 80) has changed, and we live in the same house  It is difficult to deal with her on daily basis  This is not the woman I grew up with " She noted she is an only child  "It is just me " She noted her mother "never really had no close friends  " states her mother took care of her  (Yajaira's father) for 14 years; states her mother grew up with an alcoholic father and her mother's mother had to work; states her mother's parents  in 1946; "It is [de-identified] years today that my father passed away " With reported medical problems her mother is not that mobile  states her grandmother and her lived together until Brett Duarte was age 16  "We were very close " She contends her  and her mother "do not get along " She describes her mother as "very needy  I have to take care of everybody " discussed the purpose/meaning of the Caregiver Support Group as well as discussed and had her sign the Group Procedures/Guidelines; A: presents as receptive to participating in the Group; P: (G#1) will begin participating in the [facility];   Pain Scale and Suicide Risk [facility]: On a scale of 0 to 10, the patient rates current pain at 0   Current suicide risk is low   Behavioral Health Treatment Plan [facility]: Diagnosis and Treatment Plan explained to patient, patient relates understanding diagnosis and is agreeable to Treatment Plan  Assessment    1  Depression (311) (F32 9)   2  Anxiety (300 00) (F41 9)   3   Panic attack (300 01) (F41 0)    Signatures   Electronically signed by : Roderick Mcfarland, MSWLCSWMSOFE,JIGAR; May 31 2016  7:17PM EST                       (Author)

## 2018-01-16 NOTE — RESULT NOTES
Verified Results  * CT CHEST WO CONTRAST [id number] 10:24AM Shweta Finn Order Number: [id number]    - Patient Instructions: To schedule this appointment, please contact Central Scheduling at [phone]  Test Name Result Flag Reference   CT CHEST WO CONTRAST (Report)     This is a summary report  The complete report is available in the patient's medical record  If you cannot access the medical record, please contact the sending organization for a detailed fax or copy  CT CHEST WITHOUT IV CONTRAST     INDICATION: Follow-up pulmonary nodule, occasional shortness of breath  COMPARISON: CT chest 6/8/2017     TECHNIQUE: CT examination of the chest was performed without intravenous contrast  Reformatted images were created in axial, sagittal, and coronal planes  Radiation dose length product (DLP) for this visit: 145 mGy-cm   This examination, like all CT scans performed in the [facility], was performed utilizing techniques to minimize radiation dose exposure, including the use of iterative    reconstruction and automated exposure control  FINDINGS:     LUNGS: Stable 6 mm right middle lobe pulmonary nodule (series 3 image 30)  Adjacent 4 mm right middle lobe nodule (series 3 image 27) there is bibasilar dependent atelectasis  There is linear scarring versus atelectasis at the left lung base  In    retrospect is also stable  PLEURA: Unremarkable  HEART/GREAT VESSELS: Unremarkable for patient's age  MEDIASTINUM AND SAMANTHA: Unremarkable  CHEST WALL AND LOWER NECK:    Normal      VISUALIZED STRUCTURES IN THE UPPER ABDOMEN: Unremarkable  OSSEOUS STRUCTURES: No acute fracture  No destructive osseous lesion  Status post median sternotomy  IMPRESSION:   Stable stable right middle lobe pulmonary nodules  Recommend continued 6 month follow-up to ensure stability               Workstation performed: [id number]     Signed by:   Julio Melendez MD   11/24/17 RAD_DOSE   Modality Radiation Exposure Data    Order Radiation    Type Dose Range    Radiation Dose 145 mGy-cm 0 - 6000 mGy-cm

## 2018-01-16 NOTE — MISCELLANEOUS
Message  GI Reminder Recall [address] [place] [address] [address]:   Date: 12/14/2016   Dear Aileen Puckett:     Review of our records shows you are due for the following: Follow Up Visit  Please call the following office to schedule your appointment:   [facility], [facility] [address] [place] [address], [address] [place] [address]  [phone]  We look forward to hearing from you!      Sincerely,     Ellie Perla MA      Signatures   Electronically signed by : Ellie Perla, ; Dec 14 2016  2:00PM EST                       (Author)

## 2018-01-16 NOTE — RESULT NOTES
Message   Notify the patient the vitamin B-12 level is on the low end of normal please check with the patient if she is been receiving her vitamin B12 injections and let me know        Verified Results  (1) VITAMIN B12 97Aem6533 02:54PM Jammie Forrest Order Number: [id number]     Test Name Result Flag Reference   VITAMIN B12 262 pg/mL  100-900       Signatures   Electronically signed by : Colton Chamberlain DO; Sep 30 2016 12:03PM EST                       (Author)

## 2018-01-16 NOTE — RESULT NOTES
Message   PLEASE FAX COPY OF LABS TO DR Pablo Freitas AT [address]        Verified Results  (1) COMPREHENSIVE METABOLIC PANEL [id number] 68:16SH Lucila Adkins     Test Name Result Flag Reference   GLUCOSE 206 mg/dL H 65-99   Fasting reference interval   UREA NITROGEN (BUN) 15 mg/dL  7-25   CREATININE 1 11 mg/dL H 0 50-0 99   For patients >52years of age, the reference limit  for Creatinine is approximately 13% higher for people  identified as -American  eGFR NON-AFR  AMERICAN 51 mL/min/1 73m2 L > OR = 60   eGFR AFRICAN AMERICAN 59 mL/min/1 73m2 L > OR = 60   BUN/CREATININE RATIO 14 (calc)  6-22   SODIUM 137 mmol/L  135-146   POTASSIUM 4 3 mmol/L  3 5-5 3   CHLORIDE 101 mmol/L     CARBON DIOXIDE 26 mmol/L  19-30   CALCIUM 9 2 mg/dL  8 6-10 4   PROTEIN, TOTAL 7 2 g/dL  6 1-8 1   ALBUMIN 4 1 g/dL  3 6-5 1   GLOBULIN 3 1 g/dL (calc)  1 9-3 7   ALBUMIN/GLOBULIN RATIO 1 3 (calc)  1 0-2 5   BILIRUBIN, TOTAL 0 5 mg/dL  0 2-1 2   ALKALINE PHOSPHATASE 103 U/L     AST 19 U/L  10-35   ALT 16 U/L  6-29     (1) MAGNESIUM [id number] 12:23PM Lucila Adkins     Test Name Result Flag Reference   MAGNESIUM 1 5 mg/dL  1 5-2 5     (Q) LIPID PANEL WITH REFLEX TO DIRECT LDL [id number] 12:23PM Lucila [email or web address]     Test Name Result Flag Reference   CHOLESTEROL, TOTAL 172 mg/dL  125-200   HDL CHOLESTEROL 41 mg/dL L > OR = 46   TRIGLICERIDES 762 mg/dL  <150   LDL-CHOLESTEROL 109 mg/dL (calc)  <130   Desirable range <100 mg/dL for patients with CHD or  diabetes and <70 mg/dL for diabetic patients with  known heart disease  CHOL/HDLC RATIO 4 2 (calc)  < OR = 5 0   NON HDL CHOLESTEROL 131 mg/dL (calc)     Target for non-HDL cholesterol is 30 mg/dL higher than   LDL cholesterol target       (Q) TSH, 3RD GENERATION W/REFLEX TO FT4 [id number] 12:23PM Lucila [email or web address]     Test Name Result Flag Reference   TSH W/REFLEX TO FT4 2 85 mIU/L  0 40-4 50     (Q) HEMOGLOBIN A1c [id number] 12:23PM Lucila Adkins   REPORT COMMENT:  FASTING:YES     Test Name Result Flag Reference   HEMOGLOBIN A1c 8 8 % of total Hgb H <5 7   According to ADA guidelines, hemoglobin A1c <7 0%  represents optimal control in non-pregnant diabetic  patients  Different metrics may apply to specific  patient populations  Standards of Medical Care in    Diabetes Care  2013;36:s11-s66     For the purpose of screening for the presence of  diabetes  <5 7%       Consistent with the absence of diabetes  5 7-6 4%    Consistent with increased risk for diabetes              (prediabetes)  >or=6 5%    Consistent with diabetes     This assay result is consistent with diabetes  mellitus  Currently, no consensus exists for use of hemoglobin  A1c for diagnosis of diabetes for children  Discussion/Summary   YOUR DIABETES COULD STILL SEE IMPROVEMENT  DR Heaton Offer WILL ADDRESS  CHOLESTEROL OK  MAGNESIUM IS BORDERLINE LOW  YOU WOULD BENEFIT FROM TAKING AN OVER THE COUNTER SUPPLEMENT IF NOT ALREADY TAKING ONE  THYROID LOOKS OK        Signatures   Electronically signed by : ALAN Kapoor; Feb 21 2016 12:19PM EST                       (Author)

## 2018-01-16 NOTE — PSYCH
Message  Message Free Text Note Form: With reminder call today at 11: 16 AM, she cancelled the Caregiver Support for tomorrow at 1 PM and to place her participation on hold at this time  Active Problems    1  Abdominal pain (789 00) (R10 9)   2  Abdominal pain, epigastric (789 06) (R10 13)   3  Abnormal blood chemistry (790 6) (R79 9)   4  Abrasion (919 0) (T14 8)   5  Abrasion of arm, left (913 0) (S40 812A)   6  Acute deep vein thrombosis of lower extremity (453 40) (I82 409)   7  Acute upper respiratory infection (465 9) (J06 9)   8  Adenomatous polyp of colon (211 3) (D12 6)   9  Anemia (285 9) (D64 9)   10  Anosmia (781 1) (R43 0)   11  Antral ulcer (531 90) (K25 9)   12  Anxiety (300 00) (F41 9)   13  Asthma (493 90) (J45 909)   14  Atypical chest pain (786 59) (R07 89)   15  Balance problems (781 99) (R26 89)   16  Benign essential hypertension (401 1) (I10)   17  Bezoars (938)   18  Bronchitis, asthmatic (493 90) (J45 909)   19  Cervical disc disease (722 91) (M50 90)   20  Cervical disc herniation (722 0) (M50 20)   21  Cervical radiculopathy (723 4) (M54 12)   22  Cervical radiculopathy (723 4) (M54 12)   23  Cervical spinal stenosis (723 0) (M48 02)   24  Cervical spinal stenosis (723 0) (M48 02)   25  Cervical spinal stenosis (723 0) (M48 02)   26  Cervical strain (847 0) (S16 1XXA)   27  Chronic mastoiditis (383 1) (H70 10)   28  Chronic renal insufficiency (585 9) (N18 9)   29  Colon cancer screening (V76 51) (Z12 11)   30  Contusion of skin with intact surface (924 9) (T14 8)   31  Depression (311) (F32 9)   32  Depression with anxiety (300 4) (F41 8)   33  Diabetic polyneuropathy associated with type 2 diabetes mellitus (250 60,357 2)    (E11 42)   34  Diarrhea (787 91) (R19 7)   35  Disc degeneration, lumbar (722 52) (M51 36)   36  Dizziness (780 4) (R42)   37  DM2 (diabetes mellitus, type 2) (250 00) (E11 9)   38  Dyspepsia (536 8) (K30)   39  Early satiety (780 94) (R68 81)   40   Elevated C-reactive protein (790 95) (R79 82)   41  Encounter for screening for osteoporosis (V82 81) (Z13 820)   42  Encounter for screening mammogram for breast cancer (V76 12) (Z12 31)   43  Falls (E888 9) (W19 XXXA)   44  Fatigue (780 79) (R53 83)   45  Flushing (782 62) (R23 2)   46  Headache (784 0) (R51)   47  Heart palpitations (785 1) (R00 2)   48  History of allergy (V15 09) (Z88 9)   49  Hyperlipidemia (272 4) (E78 5)   50  Hypomagnesemia (275 2) (E83 42)   51  Influenza vaccination administered during current admission (V04 81) (Z23)   52  Initial Medicare annual wellness visit (V70 0) (Z00 00)   53  Low vitamin B12 level (266 2) (E53 8)   54  Lumbar radiculopathy (724 4) (M54 16)   55  Macular Retinal Edema Of Both Eyes (362 83)   56  Microalbuminuria (791 0) (R80 9)   57  Multiple joint pain (719 49) (M25 50)   58  Myofascial pain syndrome (729 1) (M79 1)   59  Neck pain (723 1) (M54 2)   60  Need for pneumococcal vaccination (V03 82) (Z23)   61  Need for prophylactic vaccination and inoculation against influenza (V04 81) (Z23)   62  History of Need for prophylactic vaccination and inoculation against influenza (V04 81)    (Z23)   63  Pain in wrist, unspecified laterality (719 43) (M25 539)   64  Palpitations (785 1) (R00 2)   65  Panic attack (300 01) (F41 0)   66  Post-nasal drip (784 91) (R09 82)   67  Pounding headache (784 0) (R51)   68  Pounding heartbeat (785 1) (R00 2)   69  Preoperative clearance (V72 84) (Z01 818)   70  Primary hypothyroidism (244 9) (E03 9)   71  Psoriatic arthropathy (696 0) (L40 50)   72  Right knee pain (719 46) (M25 561)   73  Screening for genitourinary condition (V81 6) (Z13 89)   74  Screening for neurological condition (V80 09) (Z13 89)   75  Shortness of breath (786 05) (R06 02)   76  Sinusitis (473 9) (J32 9)   77  Spinal stenosis (724 00) (M48 00)   78  Spondylosis of cervical region without myelopathy or radiculopathy (721 0) (M47 812)   79   Spondylosis of lumbar region without myelopathy or radiculopathy (721 3) (M47 816)   80  Umbilical hernia (856 0) (K42 9)   81  Urinary frequency (788 41) (R35 0)   82  Ventral hernia (553 20) (K43 9)   83  Visit for pre-operative examination (V72 84) (Z01 818)   84  Vitamin B12 deficiency (266 2) (E53 8)   85  Vitamin D deficiency (268 9) (E55 9)   86  Vitreo-retinal adhesions (379 29) (H43 89)    Current Meds   1  ALPRAZolam 0 5 MG Oral Tablet; TAKE 1 TABLET 3 times daily PRN; Therapy: 85SJZ1483 to (Evaluate:19Rti7056)  Requested for: 81SJJ0606; Last   Rx:02Mar2017 Ordered   2  AmLODIPine Besylate 2 5 MG Oral Tablet; take 2 tablet daily; Therapy: (Recorded:69Lss5521) to Recorded   3  Aspirin 81 MG TABS; Take 1 tablet daily; Therapy: (Recorded:66Xnu7828) to Recorded   4  Atenolol 25 MG Oral Tablet; take 0 5 tablet daily; Therapy: (Recorded:20Apr2015) to Recorded   5  BD Pen Needle Clover U/F 32G X 4 MM Miscellaneous; use as directed; Therapy: 77ZXF5763 to (Evaluate:68Rql4971)  Requested for: 20Apr2015 Recorded   6  CoQ-10 100 MG Oral Capsule; TAKE 2 CAPSULE Twice daily; Therapy: (Recorded:30Nov2015) to Recorded   7  Cymbalta 60 MG Oral Capsule Delayed Release Particles (DULoxetine HCl); TAKE 1   CAPSULE DAILY; Therapy: 87WLG6141 to (Evaluate:10Mar2016) Recorded   8  Enalapril Maleate 10 MG Oral Tablet; TAKE TABLET  TAKE 10 MG IN AM, 10 MG IN PM;   Therapy: 11HXW8509 to  Requested for: 15YRX8450 Recorded   9  Lantus SoloStar 100 UNIT/ML Subcutaneous Solution Pen-injector; INJECT 40 UNIT   Every AM and PM;   Therapy: 20Mar2012 to (Evaluate:46Mbz9018)  Requested for: 77XKW8957 Recorded   10  Lipitor 40 MG Oral Tablet (Atorvastatin Calcium); TAKE 1 TABLET DAILY; Therapy: 78ORR0351 to (Evaluate:18Oct2015) Recorded   11  MetFORMIN HCl  MG Oral Tablet Extended Release 24 Hour; TAKE 1 TABLET    TWICE DAILY -TAKE WITH A MEAL OR FOOD;     Therapy: 58DNR4751 to (Aubrie Love)  Requested for: 29QUF7672; Last    Rx:50Ynj3068 Ordered   12  NovoLOG 100 UNIT/ML Subcutaneous Solution; INJECT 4 UNITS TO 14 UNITS    SUBCUTANEOUSLY THREE TIMES DAILY IN THE MORNING, WITH LUNCH, AND WITH    DINNER (DIABETES MELLITUS) (ROTATE SITES); Therapy: 83IUJ6762 to (Evaluate:17Gmr7212)  Requested for: 21Apr2015 Recorded   13  ProAir  (90 Base) MCG/ACT Inhalation Aerosol Solution; INHALE 2 PUFFS    EVERY 4-6 HOURS AS NEEDED; Therapy: 94VDN0913 to (LUKTQNUM:45NDO6901)  Requested for: 16Sep2014; Last    Rx:30Cfh3422 Ordered   14  Synthroid 50 MCG Oral Tablet (Levothyroxine Sodium); TAKE 1 TABLET DAILY AS    DIRECTED; Therapy: 31FEE1670 to (Evaluate:47Aqr1543)  Requested for: 37RZO3384 Recorded   15  TraMADol HCl - 50 MG Oral Tablet; TAKE TABLET  ONE DAILY AS NEEDED FOR PAIN;    Therapy: (Recorded:30Nov2015) to Recorded   16  Vitamin D3 2000 UNIT Oral Capsule; TAKE 1 CAPSULE ONCE DAILY; Therapy: (Recorded:21Apr2015) to Recorded   17  Voltaren 1 % Transdermal Gel (Diclofenac Sodium); APPLY TO LOWER EXTREMITIES, 4    GM OF GEL TO AFFECTED AREA 4 TIMES DAILY  DO NOT APPLY MORE    THAN 16 GM DAILY TO ANY ONE AFFECTED JOINT; Therapy: 22ZHN8605 to (Evaluate:30Jan2015)  Requested for: 16Sep2014; Last    Rx:72Vrh8544 Ordered    Allergies    1  No Known Drug Allergies    2   Seasonal    Signatures   Electronically signed by : Mee Sanz, GEORGELCSWMSOFE,MARIANGELW; Apr 18 2017 11:17AM EST                       (Author)

## 2018-01-17 ENCOUNTER — HOSPITAL ENCOUNTER (OUTPATIENT)
Dept: NON INVASIVE DIAGNOSTICS | Facility: CLINIC | Age: 73
Discharge: HOME | End: 2018-01-17
Payer: COMMERCIAL

## 2018-01-17 ENCOUNTER — GENERIC CONVERSION - ENCOUNTER (OUTPATIENT)
Dept: OTHER | Facility: OTHER | Age: 73
End: 2018-01-17

## 2018-01-17 DIAGNOSIS — E11.42 TYPE 2 DIABETES MELLITUS WITH DIABETIC POLYNEUROPATHY (HCC): ICD-10-CM

## 2018-01-17 PROCEDURE — 93923 UPR/LXTR ART STDY 3+ LVLS: CPT

## 2018-01-18 NOTE — PSYCH
History of Present Illness  Psychotherapy Provided [facility]: Individual Psychotherapy 45 minutes minutes provided today  Goals addressed in session:   Patient continues to deal with much stress on daily basis dealing with her mother and her behavior/personality issues/aging issues as well as her  and his mood issues  Often feels unappreciated by them  Feels appreciated by children and grandchildren  Unfortunately, mother and her  both reluctant to change to lessen stress  Patient verbal and cooperative  HPI - Psych: Patient has continues to feel anxious and overwhelmed with stressors at home  Gets some relief social outings with neighbors and with her involvement in her grandchildren's activities  Denies any depressive symptoms  Denies any SI   Note   Note:   Reviewed need to maintain and increase social contact/activity separate from mother and away from home to offset stress and she agrees  Unfortunately, any services or supports for mother would need to be agreed upon by mother which would not happen  Reviewed some assertiveness strategies to make patients needs primary  Offered sessions on a PRN basis  Assessment    1   Anxiety (300 00) (F41 9)    Signatures   Electronically signed by : Gerson Rdz LCSW; Dec 21 2016  1:09PM EST                       (Author)

## 2018-01-18 NOTE — PSYCH
Message  Message Free Text Note Form: lm to cancel her Caregiver Support Group session fro today at 1 PM due to inclement weather; Active Problems    1  Abdominal pain (789 00) (R10 9)   2  Abdominal pain, epigastric (789 06) (R10 13)   3  Abnormal blood chemistry (790 6) (R79 9)   4  Abrasion (919 0) (T14 8)   5  Abrasion of arm, left (913 0) (S40 812A)   6  Acute deep vein thrombosis of lower extremity (453 40) (I82 409)   7  Acute upper respiratory infection (465 9) (J06 9)   8  Adenomatous polyp of colon (211 3) (D12 6)   9  Anemia (285 9) (D64 9)   10  Anosmia (781 1) (R43 0)   11  Antral ulcer (531 90) (K25 9)   12  Anxiety (300 00) (F41 9)   13  Asthma (493 90) (J45 909)   14  Atypical chest pain (786 59) (R07 89)   15  Balance problems (781 99) (R26 89)   16  Benign essential hypertension (401 1) (I10)   17  Bezoars (938)   18  Bronchitis, asthmatic (493 90) (J45 909)   19  Cervical disc disease (722 91) (M50 90)   20  Cervical disc herniation (722 0) (M50 20)   21  Cervical radiculopathy (723 4) (M54 12)   22  Cervical radiculopathy (723 4) (M54 12)   23  Cervical spinal stenosis (723 0) (M48 02)   24  Cervical spinal stenosis (723 0) (M48 02)   25  Cervical spinal stenosis (723 0) (M48 02)   26  Cervical strain (847 0) (S16 1XXA)   27  Chronic mastoiditis (383 1) (H70 10)   28  Chronic renal insufficiency (585 9) (N18 9)   29  Colon cancer screening (V76 51) (Z12 11)   30  Contusion of skin with intact surface (924 9) (T14 8)   31  Depression (311) (F32 9)   32  Depression with anxiety (300 4) (F41 8)   33  Diabetic polyneuropathy associated with type 2 diabetes mellitus (250 60,357 2)    (E11 42)   34  Diarrhea (787 91) (R19 7)   35  Disc degeneration, lumbar (722 52) (M51 36)   36  Dizziness (780 4) (R42)   37  DM2 (diabetes mellitus, type 2) (250 00) (E11 9)   38  Dyspepsia (536 8) (K30)   39  Early satiety (780 94) (R68 81)   40  Elevated C-reactive protein (790 95) (R79 82)   41   Encounter for screening for osteoporosis (V82 81) (Z13 820)   42  Encounter for screening mammogram for breast cancer (V76 12) (Z12 31)   43  Falls (E888 9) (W19 XXXA)   44  Fatigue (780 79) (R53 83)   45  Flushing (782 62) (R23 2)   46  Headache (784 0) (R51)   47  Heart palpitations (785 1) (R00 2)   48  History of allergy (V15 09) (Z88 9)   49  Hyperlipidemia (272 4) (E78 5)   50  Hypomagnesemia (275 2) (E83 42)   51  Influenza vaccination administered during current admission (V04 81) (Z23)   52  Initial Medicare annual wellness visit (V70 0) (Z00 00)   53  Low vitamin B12 level (266 2) (E53 8)   54  Lumbar radiculopathy (724 4) (M54 16)   55  Macular Retinal Edema Of Both Eyes (362 83)   56  Microalbuminuria (791 0) (R80 9)   57  Multiple joint pain (719 49) (M25 50)   58  Myofascial pain syndrome (729 1) (M79 1)   59  Neck pain (723 1) (M54 2)   60  Need for pneumococcal vaccination (V03 82) (Z23)   61  Need for prophylactic vaccination and inoculation against influenza (V04 81) (Z23)   62  History of Need for prophylactic vaccination and inoculation against influenza (V04 81)    (Z23)   63  Pain in wrist, unspecified laterality (719 43) (M25 539)   64  Palpitations (785 1) (R00 2)   65  Panic attack (300 01) (F41 0)   66  Post-nasal drip (784 91) (R09 82)   67  Pounding headache (784 0) (R51)   68  Pounding heartbeat (785 1) (R00 2)   69  Preoperative clearance (V72 84) (Z01 818)   70  Primary hypothyroidism (244 9) (E03 9)   71  Psoriatic arthropathy (696 0) (L40 50)   72  Right knee pain (719 46) (M25 561)   73  Screening for genitourinary condition (V81 6) (Z13 89)   74  Screening for neurological condition (V80 09) (Z13 89)   75  Shortness of breath (786 05) (R06 02)   76  Sinusitis (473 9) (J32 9)   77  Spinal stenosis (724 00) (M48 00)   78  Spondylosis of cervical region without myelopathy or radiculopathy (721 0) (M47 812)   79   Spondylosis of lumbar region without myelopathy or radiculopathy (721 3) (M47 816) 80  Umbilical hernia (061 7) (K42 9)   81  Urinary frequency (788 41) (R35 0)   82  Ventral hernia (553 20) (K43 9)   83  Visit for pre-operative examination (V72 84) (Z01 818)   84  Vitamin B12 deficiency (266 2) (E53 8)   85  Vitamin D deficiency (268 9) (E55 9)   86  Vitreo-retinal adhesions (379 29) (H43 89)    Current Meds   1  ALPRAZolam 0 5 MG Oral Tablet; TAKE 1 TABLET 3 times daily PRN; Therapy: 20EUZ9137 to (Evaluate:32Fpm3589)  Requested for: 73QXQ4549; Last   Rx:02Mar2017 Ordered   2  AmLODIPine Besylate 2 5 MG Oral Tablet; take 2 tablet daily; Therapy: (Recorded:61Isc9133) to Recorded   3  Aspirin 81 MG TABS; Take 1 tablet daily; Therapy: (Recorded:59Yiq5063) to Recorded   4  Atenolol 25 MG Oral Tablet; take 0 5 tablet daily; Therapy: (Recorded:20Apr2015) to Recorded   5  BD Pen Needle Clover U/F 32G X 4 MM Miscellaneous; use as directed; Therapy: 22YGC7858 to (Evaluate:63Yfh0794)  Requested for: 20Apr2015 Recorded   6  CoQ-10 100 MG Oral Capsule; TAKE 2 CAPSULE Twice daily; Therapy: (Recorded:30Nov2015) to Recorded   7  Cymbalta 60 MG Oral Capsule Delayed Release Particles (DULoxetine HCl); TAKE 1   CAPSULE DAILY; Therapy: 36MTN3174 to (Evaluate:10Mar2016) Recorded   8  Enalapril Maleate 10 MG Oral Tablet; TAKE TABLET  TAKE 10 MG IN AM, 10 MG IN PM;   Therapy: 69CXS7619 to  Requested for: 23CYD0045 Recorded   9  Lantus SoloStar 100 UNIT/ML Subcutaneous Solution Pen-injector; INJECT 40 UNIT   Every AM and PM;   Therapy: 20Mar2012 to (Evaluate:73Slq0996)  Requested for: 11XUF4490 Recorded   10  Lipitor 40 MG Oral Tablet (Atorvastatin Calcium); TAKE 1 TABLET DAILY; Therapy: 30EKX3384 to (Evaluate:40Kqh1798) Recorded   11  MetFORMIN HCl  MG Oral Tablet Extended Release 24 Hour; TAKE 1 TABLET    TWICE DAILY -TAKE WITH A MEAL OR FOOD; Therapy: 73PRR0929 to (Dwaine Dandy)  Requested for: 26EII7413; Last    Rx:78Chh7263 Ordered   12   NovoLOG 100 UNIT/ML Subcutaneous Solution; INJECT 4 UNITS TO 14 UNITS    SUBCUTANEOUSLY THREE TIMES DAILY IN THE MORNING, WITH LUNCH, AND WITH    DINNER (DIABETES MELLITUS) (ROTATE SITES); Therapy: 25UTM5229 to (Evaluate:58Cxr9440)  Requested for: 21Apr2015 Recorded   13  ProAir  (90 Base) MCG/ACT Inhalation Aerosol Solution; INHALE 2 PUFFS    EVERY 4-6 HOURS AS NEEDED; Therapy: 92FEW3850 to (NQDZQP:31KRJ2840)  Requested for: 16Sep2014; Last    Rx:47Esc6199 Ordered   14  Synthroid 50 MCG Oral Tablet (Levothyroxine Sodium); TAKE 1 TABLET DAILY AS    DIRECTED; Therapy: 39KLJ7809 to (Evaluate:97Dos4045)  Requested for: 57DJT7660 Recorded   15  TraMADol HCl - 50 MG Oral Tablet; TAKE TABLET  ONE DAILY AS NEEDED FOR PAIN;    Therapy: (Recorded:30Nov2015) to Recorded   16  Vitamin D3 2000 UNIT Oral Capsule; TAKE 1 CAPSULE ONCE DAILY; Therapy: (Recorded:21Apr2015) to Recorded   17  Voltaren 1 % Transdermal Gel (Diclofenac Sodium); APPLY TO LOWER EXTREMITIES, 4    GM OF GEL TO AFFECTED AREA 4 TIMES DAILY  DO NOT APPLY MORE    THAN 16 GM DAILY TO ANY ONE AFFECTED JOINT; Therapy: 43SUA3321 to (Evaluate:30Jan2015)  Requested for: 16Sep2014; Last    Rx:86Svv4333 Ordered    Allergies    1  No Known Drug Allergies    2   Seasonal    Signatures   Electronically signed by : Yana Stewart, RASHARDSWGEORGE,JIGAR; Mar 15 2017 10:08AM EST                       (Author)

## 2018-01-18 NOTE — PSYCH
History of Present Illness  Psychotherapy Provided  [facility]: Individual Psychotherapy 45 minutes minutes provided today  Goals addressed in session:   Carol Carter details more issues with lack of energy,level and motivation  Neglecting issues around the house  Only time she ha more energy and interest is when grandchildren around  Dealing with a great deal of stress on daily basis with her elderly mother and her personality issues/behavior  Mother resides with hr and creates stress for her and her   She is verbal and cooperative  HPI - Psych: Carol Carter experiencing some increased symptoms of depression as indicated prior  Denies any SI  Has limited privacy at home and unfortunately has no other family to share burden with  Denies any SI  Has supportive , children and grandchildren  Identifies issues with mother as being primary stressor   Note   Note:   Provided supportive therapy, Reviewed coping strategies to employ  Given her history of depression will consult with PCP on current status and develop plan AFTER  Assessment    1   Depression with anxiety (300 4) (F41 8)    Signatures   Electronically signed by : Fely Aldrich LCSW; Aug  2 2017  1:30PM EST                       (Author)

## 2018-01-23 VITALS
HEART RATE: 72 BPM | BODY MASS INDEX: 37.81 KG/M2 | WEIGHT: 205.5 LBS | RESPIRATION RATE: 16 BRPM | DIASTOLIC BLOOD PRESSURE: 64 MMHG | HEIGHT: 62 IN | TEMPERATURE: 98.2 F | SYSTOLIC BLOOD PRESSURE: 138 MMHG

## 2018-01-23 VITALS
HEIGHT: 61 IN | RESPIRATION RATE: 16 BRPM | WEIGHT: 205.38 LBS | TEMPERATURE: 98.7 F | BODY MASS INDEX: 38.78 KG/M2 | DIASTOLIC BLOOD PRESSURE: 56 MMHG | HEART RATE: 68 BPM | SYSTOLIC BLOOD PRESSURE: 148 MMHG

## 2018-01-23 NOTE — RESULT NOTES
Message   Notify the patient slightly low ferritin level; is the patient currently on iron, also when was the last colonoscopy; please have the pt follow up with me to discuss as scheduled        Verified Results  (1) IRON [id number] 10:16AM Dashawn Barbour    Order Number: [id number]     Test Name Result Flag Reference   IRON 51 ug/dL     Patients treated with metal-binding drugs (ie  Deferoxamine) may have depressed iron values       (1) TIBC 42YJK9691 10:16AM [facility] Order Number: [id number]     Test Name Result Flag Reference   TOTAL IRON BINDING CAPACITY 403 ug/dL  250-450     (1) FERRITIN [id number] 10:16AM [facility] Order Number: [id number]     Test Name Result Flag Reference   FERRITIN 19 ng/mL  8-161

## 2018-02-05 ENCOUNTER — OFFICE VISIT (OUTPATIENT)
Dept: PAIN MEDICINE | Facility: CLINIC | Age: 73
End: 2018-02-05
Payer: COMMERCIAL

## 2018-02-05 VITALS
HEIGHT: 61 IN | WEIGHT: 207 LBS | SYSTOLIC BLOOD PRESSURE: 122 MMHG | RESPIRATION RATE: 16 BRPM | DIASTOLIC BLOOD PRESSURE: 64 MMHG | BODY MASS INDEX: 39.08 KG/M2 | HEART RATE: 68 BPM

## 2018-02-05 DIAGNOSIS — M48.062 LUMBAR STENOSIS WITH NEUROGENIC CLAUDICATION: Primary | ICD-10-CM

## 2018-02-05 DIAGNOSIS — G89.4 CHRONIC PAIN SYNDROME: ICD-10-CM

## 2018-02-05 PROBLEM — B35.1 ONYCHOMYCOSIS: Status: ACTIVE | Noted: 2017-10-27

## 2018-02-05 PROBLEM — I73.9 VASCULAR CLAUDICATION (HCC): Status: ACTIVE | Noted: 2017-12-11

## 2018-02-05 PROBLEM — R93.89 THICKENED ENDOMETRIUM: Status: ACTIVE | Noted: 2017-04-25

## 2018-02-05 PROBLEM — IMO0002 TYPE II OR UNSPECIFIED TYPE DIABETES MELLITUS WITH OTHER SPECIFIED MANIFESTATIONS, UNCONTROLLED: Chronic | Status: ACTIVE | Noted: 2017-04-19

## 2018-02-05 PROCEDURE — 99214 OFFICE O/P EST MOD 30 MIN: CPT | Performed by: ANESTHESIOLOGY

## 2018-02-05 RX ORDER — ALBUTEROL SULFATE 90 UG/1
2 AEROSOL, METERED RESPIRATORY (INHALATION) EVERY 6 HOURS PRN
COMMUNITY
Start: 2014-05-23 | End: 2022-05-06

## 2018-02-05 NOTE — PROGRESS NOTES
Assessment:  1  Lumbar stenosis with neurogenic claudication    2  Chronic pain syndrome        Plan:  Lisbet Milligan is a 71 y o  female with history of cervical radiculopathy,and lumbar spondylosis  Patient was last seen in the office on 03/01/2016 where she underwent a right C3-C6 medial branch block  Patient presents today with ongoing low back pain that is consistent with lumbar spinal stenosis at L4-L5  Therefore, at this time I discussed with the patient about moving forward with a bilateral L4 transforaminal epidural steroid injection to help decrease inflammation within her lumbar spine, to reduce pain  Patient was educated on procedures most common risks, and in a brochure the procedure  Patient verbalized understanding, and would like to proceed with the procedure  Therefore the patient will be scheduled for an upcoming Tuesday or Thursday  The patient will follow up after her bilateral L4 transforaminal epidural steroid injection, or sooner with the worsening of symptoms  Complete risks and benefits including bleeding, infection, tissue reaction, nerve injury and allergic reaction were discussed  The approach was demonstrated using models and literature was provided  Verbal and written consent was obtained  My impressions and treatment recommendations were discussed in detail with the patient who verbalized understanding and had no further questions  Discharge instructions were provided  I personally saw and examined the patient and I agree with the above discussed plan of care  Orders Placed This Encounter   Procedures    FL spine and pain procedure     Order Specific Question:   Reason for Exam:     Answer:   bilateral L4 TFESI     Order Specific Question:   Anticoagulant hold needed?      Answer:   Patient is on aspirin 81 mg daily     New Medications Ordered This Visit   Medications    mupirocin (BACTROBAN) 2 % ointment     Sig: APPLY TWICE A DAY     Refill:  1    albuterol [facility]) 90 mcg/act inhaler     Sig: Inhale 2 puffs       History of Present Illness:    Ching Christianson is a 71 y o  female with history of cervical radiculopathy,and lumbar spondylosis  Patient was last seen in the office on 03/01/2016 where she underwent a right C3-C6 medial branch block  Patient underwent laminectomy L4,L5,S1 in 1985  Patient reports that her neck has since improved  Patient present today with low back pain that has been present for the last 3, and worsened over the last 3 months  Patient contributes her worsening low back pain due to her falling multiple times, with her last fall occurring last week when she fell off on step and landed on her buttocks  Patient reports that she has not followed up with the emergency room or her family doctor after falling  She reports that she fell 2 times in the last 6 months  She reports that she has bilateral low back pain that radiates into the posterior and anterior aspect of her bilateral legs  She reports bilateral leg weakness, with the left worse then the right  She describes her pain as aching, weakness pain that is intermittent in nature  Patient rates her pain 8 out 10 numeric pain scale  Patient reports that she is not taking any medications for pain, due to she does not like to take medications  She has used lidocaine patches in the past  She reports that she just rests when she is experiencing pain  I have personally reviewed and/or updated the patient's past medical history, past surgical history, family history, social history, current medications, allergies, and vital signs today  Review of Systems:    Review of Systems   Constitutional: Negative for fever and unexpected weight change  HENT: Negative for trouble swallowing  Eyes: Negative for visual disturbance  Respiratory: Negative for shortness of breath and wheezing  Cardiovascular: Negative for chest pain and palpitations     Gastrointestinal: Negative for constipation, diarrhea, nausea and vomiting  Endocrine: Negative for cold intolerance, heat intolerance and polydipsia  Genitourinary: Negative for difficulty urinating and frequency  Musculoskeletal: Positive for arthralgias, gait problem and myalgias  Negative for joint swelling  Pain B/L LE   Skin: Negative for rash  Neurological: Negative for dizziness, seizures, syncope, weakness and headaches  Hematological: Does not bruise/bleed easily  Psychiatric/Behavioral: Negative for dysphoric mood          Depression       Patient Active Problem List   Diagnosis    Anxiety    Type II or unspecified type diabetes mellitus with other specified manifestations, uncontrolled    Depression    Chronic renal insufficiency    Chest pain on breathing    History of DVT (deep vein thrombosis)    PVC's (premature ventricular contractions)    CAD (coronary artery disease)    Essential hypertension    Hyperlipidemia    Hypothyroidism    Fibromyalgia    Lumbar stenosis with neurogenic claudication    Adenomatous polyp of colon    Vitreo-retinal adhesions    Vitamin D deficiency    Vitamin B12 deficiency    Ventral hernia    Vascular claudication (HCC)    Thickened endometrium    Spondylosis of lumbar region without myelopathy or radiculopathy    Spondylosis of cervical region without myelopathy or radiculopathy    Palpitations    Onychomycosis    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    Panic attack    Obesity    Myofascial pain syndrome    Retinal edema       Past Medical History:   Diagnosis Date    Anxiety     Back pain     CAD (coronary artery disease)     Complex endometrial hyperplasia     Diabetes mellitus ([address] )     DVT (deep venous thrombosis) (HCC)     Fibromyalgia     Hyperlipidemia     Hypertension     Hypothyroid     Obese     Spinal stenosis     Stomach ulcer        Past Surgical History:   Procedure Laterality Date  BACK SURGERY      CARPAL TUNNEL RELEASE      CHOLECYSTECTOMY      COLONOSCOPY      CORONARY ANGIOPLASTY      CORONARY ARTERY BYPASS GRAFT      CYSTOSCOPY N/A 6/20/2017    Procedure: CYSTOSCOPY;  Surgeon: Roderick López MD;  [facility]: [facility];  Service: Gynecology Oncology    SC LAPAROSCOPY W TOT HYSTERECTUTERUS <=250 GRAM  W TUBE/OVARY N/A 6/20/2017    Procedure: ROBOTIC HYSTERECTOMY; BILATERAL SALPINO-OOPHERECTOMY; umbilical hernia repair ;  Surgeon: Roderick López MD;  Location[facility];  Service: Gynecology Oncology       History reviewed  No pertinent family history  Social History     Occupational History    Not on file       Social History Main Topics    Smoking status: Former Smoker     Years: 10 00    Smokeless tobacco: Former User    Alcohol use No    Drug use: No    Sexual activity: Not on file       Current Outpatient Prescriptions on File Prior to Visit   Medication Sig    acebutolol (SECTRAL) 200 mg capsule Take 200 mg by mouth daily at bedtime    ALPRAZolam (XANAX) 0 5 mg tablet Take 0 5 mg by mouth 3 (three) times a day as needed for anxiety    amLODIPine (NORVASC) 2 5 mg tablet Take 2 5 mg by mouth 2 (two) times a day      aspirin 81 MG tablet Take 81 mg by mouth daily    atorvastatin (LIPITOR) 40 mg tablet Take 40 mg by mouth daily at bedtime      cholecalciferol (VITAMIN D3) 1,000 units tablet Take 2,000 Units by mouth daily    Co-Enzyme Q-10 100 MG CAPS Take 1 capsule by mouth 2 (two) times a day      DULoxetine (CYMBALTA) 60 mg delayed release capsule Take 60 mg by mouth every morning    enalapril (VASOTEC) 20 mg tablet Take 20 mg by mouth daily    insulin aspart (NovoLOG) 100 units/mL injection Inject 12 Units under the skin 3 (three) times a day before meals    insulin glargine (LANTUS) 100 units/mL subcutaneous injection Inject 40 Units under the skin 2 (two) times a day    levothyroxine 50 mcg tablet Take 50 mcg by mouth daily    metFORMIN (GLUCOPHAGE) 500 mg tablet Take 500 mg by mouth 2 (two) times a day with meals    tiZANidine (ZANAFLEX) 2 mg tablet Take 2 mg by mouth daily at bedtime    enoxaparin (LOVENOX) 40 mg/0 4 mL Inject 0 4 mL under the skin daily for 13 days    famotidine (PEPCID) 20 mg tablet Take 1 tablet by mouth 2 (two) times a day for 30 days    magnesium oxide (MAG-OX) 400 mg Take 1 tablet by mouth 2 (two) times a day for 30 days    sucralfate (CARAFATE) 1 g/10 mL suspension Take 10 mL by mouth every 6 (six) hours for 30 days     No current facility-administered medications on file prior to visit  No Known Allergies    Physical Exam:    /64   Pulse 68   Resp 16   Ht 5' 1" (1 549 m)   Wt 93 9 kg (207 lb)   BMI 39 11 kg/m²     Constitutional: normal, well developed, well nourished, alert, in no distress and non-toxic and no overt pain behavior   and overweight  Eyes: anicteric  HEENT: grossly intact  Neck: supple, symmetric, trachea midline and no masses   Pulmonary:even and unlabored  Cardiovascular:No edema or pitting edema present  Skin:Normal without rashes or lesions and well hydrated  Psychiatric:Mood and affect appropriate  Neurologic:Cranial Nerves II-XII grossly intact  Musculoskeletal:normal     Lumbar Spine Exam    Appearance:  Normal lordosis  Palpation/Tenderness:  no tenderness or spasm  Sensory:  no sensory deficits noted  Range of Motion:  Flexion:  No limitation  without pain  Extension:  No limitation  without pain  Lateral Flexion - Left:  No limitation  without pain  Lateral Flexion - Right:  No limitation  without pain  Rotation - Left:  No limitation  without pain  Rotation - Right:  No limitation  without pain  Motor Strength:  Left hip flexion:  5/5  Right hip flexion:  5/5  Left knee extension:  5/5  Right knee extension:  5/5  Left foot dorsiflexion:  5/5  Left foot plantar flexion:  5/5  Right foot dorsiflexion:  5/5  Right foot plantar flexion:  5/5      Imaging  MRI LUMBAR SPINE WITHOUT CONTRAST(11/30/2017)     INDICATION:  M48 07: Spinal stenosis, lumbosacral region  History taken directly from the electronic ordering system      COMPARISON:  MRI performed on 6/9/2015     TECHNIQUE:  Sagittal T1, sagittal T2, sagittal inversion recovery, axial T1 and axial T2, coronal T2        IMAGE QUALITY:  Diagnostic     FINDINGS:  Counting reference:  Lumbosacral Junction  For the purposes of this report, L4-5 is considered just above the level of the iliac crest      ALIGNMENT:  The overall alignment appears maintained      MARROW SIGNAL:  Marrow signal is within normal limits without signs of an infiltrative process  No signs of acute fracture or significant marrow edema pattern  Vertebral body heights are maintained  Question sequela prior intervention at left L4-L5      DISTAL CORD AND CONUS:  Normal size and signal within the distal cord and conus  The conus ends at the L1 level      PARASPINAL SOFT TISSUES:  Bilateral renal cysts  Atrophy of the gluteal and paraspinal musculature noted      SACRUM:  Normal signal within the sacrum  No evidence of insufficiency or stress fracture      LOWER THORACIC DISC SPACES:    T12-L1: Right subarticular to foraminal protrusion  Suggestion of mild spinal canal stenosis but no axial images were obtained through this level  Mild right and no significant left neural foraminal stenosis      LUMBAR DISC SPACES:          L1-L2:  No significant spinal canal stenosis  No right and no left neural foraminal stenosis  No significant interval change       L2-L3:  No significant spinal canal stenosis  No right and no left neural foraminal stenosis  No significant interval change       L3-L4:  Circumferential disc bulge with ligamentum flavum thickening facet arthropathy  Mild spinal canal stenosis  No right and no left neural foraminal stenosis  No significant interval change       L4-L5:  Bilateral facet arthropathy    Circumferential disc bulge with ligamentum flavum thickening  Moderate spinal canal stenosis  Mild right and mild left neural foraminal stenosis  No significant interval change       L5-S1:  Interspace narrowing with a superimposed left foraminal protrusion  Bilateral facet arthropathy  No significant spinal canal stenosis  No right and mild left neural foraminal stenosis   No significant interval change       IMPRESSION:  Stable MRI of the lumbar spine when compared to 6/9/2015      Moderate spinal canal stenosis at L4-L5 and mild at L3-L4      Mild degrees of foraminal stenosis as detailed level by level

## 2018-02-05 NOTE — PATIENT INSTRUCTIONS
What is the epidural space? The covering over the nerve roots in the spine is call the dura  The space surrounding this dura is call the epidural space  This space is commonly used to deliver medications to the spine  Nerves travel through the epidural space before they travel down into your legs  The nerves leave the spine from small nerve holes, foramen  Inflammation of these nerve roots may cause pain in your neck regions including shoulders and arms, and lower back regions including hip, buttocks and legs  These nerve roots may become inflamed due to irritation from a damaged disc or from contact with the bone spur  What is an epidural steroid injection and why that helps? An epidural steroid injection places anti-inflammatory medicine into the epidural space to stop nerve root inflammation, therefore hopefully reducing hip, buttock and leg pain  By stopping or limiting nerve root inflammation we may be able to reduce your pain  The epidural injection may assist the injury to heal by reducing inflammation  Although not always helpful, it usually reduces pain within 3 to 7 days and can take up to 2 weeks for full affect  It may provide permanent relief or provide a period of relief that will allow other treatments like physical therapy to be more effective  What happens during the procedure? While laying face down on an x-ray table your skin will be well cleaned  The physician will numb a small area of skin in your back which may sting for a few seconds  Next, the physician will use x-ray guidance to direct a small needle into the epidural space  He will then inject contrast dye to confirm that the medicine spreads to the affected nerve root in the epidural space  After this, the physician will inject the combination of numbing medicine and anti-inflammatory steroid          What happens after the procedure? A dressing may be applied to the injection site  Your will remain in the procedure suite for about 15 to 20 minutes and the nurse will monitor blood pressure and pulse  The nurse will review your discharge instructions and you will be able to go home  You may experience numbness or weakness to the affected limb for few hours after the procedure  If this happens do not walk without assistance  Your physician may refer you to a physical therapist or chiropractor immediately afterwards while the numbing medicine is effective and over the next two weeks while the cortisone is working  It may be beneficial to repeat the procedure in about 2 to 4 weeks  A series of treatments off improved more helpful than a single injection

## 2018-02-06 NOTE — PROGRESS NOTES
Delicia Morris MD personally performed the services on Ellie Tang described in this documentation as scribed in my presence

## 2018-02-13 DIAGNOSIS — R79.9 ABNORMAL FINDING OF BLOOD CHEMISTRY: ICD-10-CM

## 2018-03-12 ENCOUNTER — OFFICE VISIT (OUTPATIENT)
Dept: NEUROSURGERY | Facility: CLINIC | Age: 73
End: 2018-03-12
Payer: COMMERCIAL

## 2018-03-12 VITALS
HEART RATE: 60 BPM | BODY MASS INDEX: 40.02 KG/M2 | HEIGHT: 61 IN | SYSTOLIC BLOOD PRESSURE: 145 MMHG | TEMPERATURE: 97.7 F | DIASTOLIC BLOOD PRESSURE: 59 MMHG | WEIGHT: 212 LBS | RESPIRATION RATE: 16 BRPM

## 2018-03-12 DIAGNOSIS — G89.4 CHRONIC PAIN SYNDROME: Primary | ICD-10-CM

## 2018-03-12 PROCEDURE — 99213 OFFICE O/P EST LOW 20 MIN: CPT | Performed by: NEUROLOGICAL SURGERY

## 2018-03-12 RX ORDER — RANOLAZINE 500 MG/1
500 TABLET, EXTENDED RELEASE ORAL 2 TIMES DAILY
COMMUNITY
End: 2018-09-14

## 2018-03-12 RX ORDER — NITROGLYCERIN 400 UG/1
SPRAY ORAL
Refills: 1 | COMMUNITY
Start: 2018-02-20

## 2018-03-12 NOTE — PROGRESS NOTES
Assessment/Plan:    No problem-specific Assessment & Plan notes found for this encounter  Problem List Items Addressed This Visit     None      Visit Diagnoses     Chronic pain syndrome    -  Primary            Subjective:      Patient ID: Ellie Tang is a 71 y o  female  Back Pain   This is a chronic problem  The current episode started more than 1 year ago  The problem occurs constantly  The problem is unchanged  The pain is present in the lumbar spine  The quality of the pain is described as aching and cramping  The pain radiates to the left thigh and right thigh  The pain is at a severity of 6/10  The pain is moderate  The pain is worse during the day  The symptoms are aggravated by position and standing  Stiffness is present in the morning  Associated symptoms include leg pain, numbness and weakness  Pertinent negatives include no abdominal pain, bladder incontinence, bowel incontinence, chest pain, fever, headaches or tingling  She has tried analgesics and muscle relaxant for the symptoms  The treatment provided mild relief  Leg Pain    Associated symptoms include numbness  Pertinent negatives include no tingling  The following portions of the patient's history were reviewed and updated as appropriate: allergies, current medications, past family history, past medical history, past social history, past surgical history and problem list     Review of Systems   Constitutional: Negative for chills and fever  Eyes: Negative for pain and visual disturbance  Respiratory: Negative for cough, shortness of breath and wheezing  Cardiovascular: Negative for chest pain and palpitations  Gastrointestinal: Negative for abdominal pain, bowel incontinence and nausea  Genitourinary: Negative for bladder incontinence  Musculoskeletal: Positive for arthralgias, back pain and gait problem  Negative for neck pain and neck stiffness  Neurological: Positive for weakness and numbness   Negative for dizziness, tingling, speech difficulty and headaches  Objective:      /59 (BP Location: Left arm, Patient Position: Sitting, Cuff Size: Standard)   Pulse 60   Temp 97 7 °F (36 5 °C)   Resp 16   Ht 5' 1" (1 549 m)   Wt 96 2 kg (212 lb)   BMI 40 06 kg/m²     HPI    Patient returns for scheduled fu commencing PT shortly following cardiac clearance, wishes to defer SUZANNE  Cleared by vascular  Physical Exam      Stable    Summary and Plan    Mrs Josue Luna is keen to commence PT/Exercise program  I explained to her again that surgery would not offer her guarantee of relief  I asked her to fu with her PCP and PM specialist, and will see her on a PRN basis

## 2018-03-21 DIAGNOSIS — D64.9 ANEMIA: ICD-10-CM

## 2018-03-21 DIAGNOSIS — E11.9 TYPE 2 DIABETES MELLITUS WITHOUT COMPLICATIONS (HCC): ICD-10-CM

## 2018-03-21 DIAGNOSIS — Z00.00 ENCOUNTER FOR GENERAL ADULT MEDICAL EXAMINATION WITHOUT ABNORMAL FINDINGS: ICD-10-CM

## 2018-04-03 ENCOUNTER — OFFICE VISIT (OUTPATIENT)
Dept: INTERNAL MEDICINE CLINIC | Facility: CLINIC | Age: 73
End: 2018-04-03
Payer: COMMERCIAL

## 2018-04-03 VITALS
RESPIRATION RATE: 16 BRPM | OXYGEN SATURATION: 98 % | DIASTOLIC BLOOD PRESSURE: 68 MMHG | WEIGHT: 210.2 LBS | HEIGHT: 62 IN | BODY MASS INDEX: 38.68 KG/M2 | SYSTOLIC BLOOD PRESSURE: 148 MMHG | HEART RATE: 79 BPM

## 2018-04-03 DIAGNOSIS — M47.816 SPONDYLOSIS OF LUMBAR REGION WITHOUT MYELOPATHY OR RADICULOPATHY: ICD-10-CM

## 2018-04-03 DIAGNOSIS — F41.9 ANXIETY: ICD-10-CM

## 2018-04-03 DIAGNOSIS — F41.9 ANXIETY: Primary | ICD-10-CM

## 2018-04-03 DIAGNOSIS — I10 ESSENTIAL HYPERTENSION: Primary | ICD-10-CM

## 2018-04-03 DIAGNOSIS — K43.9 HERNIA OF ABDOMINAL WALL: ICD-10-CM

## 2018-04-03 DIAGNOSIS — R91.1 PULMONARY NODULE: ICD-10-CM

## 2018-04-03 DIAGNOSIS — N18.30 CHRONIC RENAL IMPAIRMENT, STAGE 3 (MODERATE) (HCC): Chronic | ICD-10-CM

## 2018-04-03 DIAGNOSIS — F32.A DEPRESSION, UNSPECIFIED DEPRESSION TYPE: ICD-10-CM

## 2018-04-03 DIAGNOSIS — E03.9 HYPOTHYROIDISM, UNSPECIFIED TYPE: ICD-10-CM

## 2018-04-03 PROCEDURE — 3725F SCREEN DEPRESSION PERFORMED: CPT | Performed by: INTERNAL MEDICINE

## 2018-04-03 PROCEDURE — 99214 OFFICE O/P EST MOD 30 MIN: CPT | Performed by: INTERNAL MEDICINE

## 2018-04-03 PROCEDURE — 3066F NEPHROPATHY DOC TX: CPT | Performed by: INTERNAL MEDICINE

## 2018-04-03 PROCEDURE — 1101F PT FALLS ASSESS-DOCD LE1/YR: CPT | Performed by: INTERNAL MEDICINE

## 2018-04-03 RX ORDER — ISOSORBIDE MONONITRATE 30 MG/1
TABLET, EXTENDED RELEASE ORAL
Refills: 6 | COMMUNITY
Start: 2018-03-29 | End: 2020-06-17

## 2018-04-03 RX ORDER — ALPRAZOLAM 0.5 MG/1
0.5 TABLET ORAL 3 TIMES DAILY PRN
Qty: 90 TABLET | Refills: 0 | Status: SHIPPED | OUTPATIENT
Start: 2018-04-03 | End: 2018-05-18 | Stop reason: SDUPTHER

## 2018-04-03 RX ORDER — METOPROLOL SUCCINATE 25 MG/1
25 TABLET, EXTENDED RELEASE ORAL DAILY
Refills: 3 | COMMUNITY
Start: 2018-03-29 | End: 2018-09-14 | Stop reason: ALTCHOICE

## 2018-04-03 NOTE — PROGRESS NOTES
Assessment/Plan:         Diagnoses and all orders for this visit:    Essential hypertension  Comments:  Controlled continue the Toprol XL 25 mg once daily, vasotec 20 mg once daily will continue monitor  Orders:  -     Lipid Panel with Direct LDL reflex; Future    Hypothyroidism, unspecified type  Comments:  Continue with levothyroxine 50 mcg once daily    Spondylosis of lumbar region without myelopathy or radiculopathy  Comments:  Patient has seen Neurosurgery and will be going for physical therapy she is not a surgical candidate    Anxiety  Comments: And depression no suicidal ideation her symptoms are related to her mother living in her house I will have the patient see Moshe Quesada  Orders:  -     Ambulatory referral to Psychiatry; Future    Depression, unspecified depression type    Chronic renal impairment, stage 3 (moderate)  Comments:  Drink adequate amounts of water avoid anti-inflammatories, control diabetes and hypertension will continue monitor  Orders:  -     Comprehensive metabolic panel; Future  -     CBC (Includes Diff/Plt) (Refl); Future    Pulmonary nodule  Comments: Will check a follow-up CT scan the chest May 2018 monitor the pulmonary nodule  Orders:  -     CT chest wo contrast; Future  -     Spirometry; Future    Hernia of abdominal wall  Comments:  Rule out reducible abdominal wall hernia check ultrasound she has a previous history of hernia  Orders:  -     US abdomen complete; Future        Return to office  3 months  call if any problems  Subjective:      Patient ID: Alfredia Holter is a 71 y o  female  HPI 71 female hypothyroidism, essential hypertension, spondylosis of the lumbar spine, anxiety/depression, chronic renal insufficiency; The patient reports me compliant taking medications without untoward side effects the  The patient is here to review his medical condition, update me on the medical condition and the patient reports me no hospitalizations and no ER visits    The patient reports me increase of anxiety and depression, no suicidal ideation she reports me her symptoms are related to her mother living in her own she reports me that her mother and  do not get along; she reports me that her mother is extra critical and controlling  The pt is planning to go for physical therapy saw Neuro Surg Dr Ehsan Balderas; not going for davon "just masked the sx" pt went to card because having pressure in the chest and the back went for echo ok, sent the pt for a stress test and didn't like the way the heart reacted to the drug  Pt had the b-blocker changed and needed to stop the renexa " rock in the stomache" breathing better with change of the medication  The following portions of the patient's history were reviewed and updated as appropriate: allergies, current medications, past medical history, past social history, past surgical history and problem list     Review of Systems   Constitutional: Negative for activity change, appetite change, chills, fever and unexpected weight change  HENT: Negative for hearing loss and postnasal drip  Eyes: Negative for pain  Respiratory: Negative for cough and shortness of breath  Cardiovascular: Negative for chest pain  Gastrointestinal: Negative for abdominal distention, abdominal pain, diarrhea, nausea and vomiting  Neurological: Negative for dizziness, light-headedness and headaches  Psychiatric/Behavioral: Negative for suicidal ideas  The patient is nervous/anxious ( depression)  Objective:      /68 (BP Location: Left arm, Patient Position: Sitting, Cuff Size: Standard)   Pulse 79   Resp 16   Ht 5' 1 5" (1 562 m)   Wt 95 3 kg (210 lb 3 2 oz)   SpO2 98%   BMI 39 07 kg/m²                     No Follow-up on file        No Known Allergies    Past Medical History:   Diagnosis Date    Acute embolism and thrombosis of unspecified deep veins of unspecified lower extremity ([address] )     Last Assessed:  5/18/17    Anemia     Anosmia  Anxiety     Arthritis     Asthma     Back pain     Bilateral macular retinal edema     CAD (coronary artery disease)     Cataract     Cervical disc herniation     Cervical radiculopathy     Cervical spinal stenosis     Cervical spondylolysis     Chronic mastoiditis     Complex endometrial hyperplasia     Depression     Diabetes mellitus ([address] )     [facility] thrombosis) ([facility]     Fibromyalgia     Hyperlipidemia     Hypertension     Hypothyroid     Lumbar radiculopathy     Obese     Spinal stenosis     Stomach ulcer     Thyroid disease      Past Surgical History:   Procedure Laterality Date    BACK SURGERY      CARPAL TUNNEL RELEASE      CATARACT EXTRACTION      CHOLECYSTECTOMY      COLONOSCOPY      CORONARY ANGIOPLASTY      CORONARY ARTERY BYPASS GRAFT      CYSTOSCOPY N/A 6/20/2017    Procedure: Nathaniel Frances;  Surgeon: Lenin De Los Santos MD;  [facility];  Service: Gynecology Oncology    CA LAPAROSCOPY W TOT HYSTERECTUTERUS <=[address]  W TUBE/OVARY N/A 6/20/2017    Procedure: ROBOTIC HYSTERECTOMY; BILATERAL SALPINO-OOPHERECTOMY; umbilical hernia repair ;  Surgeon: Lenin De Los Santos MD;  Location: [facility];  Service: Gynecology Oncology    TONSILECTOMY AND ADNOIDECTOMY      UMBILICAL HERNIA REPAIR       Current Outpatient Prescriptions on File Prior to Visit   Medication Sig Dispense Refill    albuterol (PROAIR HFA) 90 mcg/act inhaler Inhale 2 puffs      amLODIPine (NORVASC) 2 5 mg tablet Take 2 5 mg by mouth 2 (two) times a day        aspirin 81 MG tablet Take 81 mg by mouth daily      atorvastatin (LIPITOR) 40 mg tablet Take 40 mg by mouth daily at bedtime        cholecalciferol (VITAMIN D3) 1,000 units tablet Take 2,000 Units by mouth daily      Co-Enzyme Q-10 100 MG CAPS Take 1 capsule by mouth 2 (two) times a day        DULoxetine (CYMBALTA) 60 mg delayed release capsule Take 60 mg by mouth every morning      enalapril (VASOTEC) 20 mg tablet Take 20 mg by mouth daily      insulin aspart (NovoLOG) 100 units/mL injection Inject 12 Units under the skin 3 (three) times a day before meals  0    insulin glargine (LANTUS) 100 units/mL subcutaneous injection Inject 40 Units under the skin 2 (two) times a day 10 mL 0    levothyroxine 50 mcg tablet Take 50 mcg by mouth daily      metFORMIN (GLUCOPHAGE) 500 mg tablet Take 500 mg by mouth 2 (two) times a day with meals      mupirocin (BACTROBAN) 2 % ointment APPLY TWICE A DAY  1    nitroglycerin (NITROLINGUAL) 0 4 mg/spray spray USE ONE SPRAY Q 5 MINUTES AS NEEDED FOR CHEST PAIN  IF NO RELIEF, CALL 911   1    tiZANidine (ZANAFLEX) 2 mg tablet Take 2 mg by mouth daily at bedtime      [DISCONTINUED] ALPRAZolam (XANAX) 0 5 mg tablet Take 0 5 mg by mouth 3 (three) times a day as needed for anxiety      acebutolol (SECTRAL) 200 mg capsule Take 200 mg by mouth daily at bedtime      enoxaparin (LOVENOX) 40 mg/0 4 mL Inject 0 4 mL under the skin daily for 13 days 5 2 mL 0    famotidine (PEPCID) 20 mg tablet Take 1 tablet by mouth 2 (two) times a day for 30 days 60 tablet 0    ranolazine (RANEXA) 500 mg 12 hr tablet Take 500 mg by mouth 2 (two) times a day      sucralfate (CARAFATE) 1 g/10 mL suspension Take 10 mL by mouth every 6 (six) hours for 30 days 420 mL 0    [DISCONTINUED] magnesium oxide (MAG-OX) 400 mg Take 1 tablet by mouth 2 (two) times a day for 30 days 60 tablet 0     No current facility-administered medications on file prior to visit        Family History   Problem Relation Age of Onset    Arthritis Mother     Leukemia Mother     Other Mother      Anxiety, major depressive disorder, recurrent episode with atypical features    Coronary artery disease Father      Heart problem    Diabetes Father     Other Father      Infectious disease    Alzheimer's disease Maternal Grandmother     Other Maternal Grandfather      Heart problem    Other Daughter      Anxiety, major depressive disorder, recurrent episode with atypical features    Alcohol abuse Other      Grandparent    Cancer Family     Diabetes Family     Hypertension Family     Other Family      Reported prior back trouble, thyroid disorder     Social History     Social History    Marital status: /Civil Union     Spouse name: N/A    Number of children: 2    Years of education: High school or GED     Occupational History    Retired      Social History Main Topics    Smoking status: Former Smoker     Years: 10 00    Smokeless tobacco: Former User      Comment: Smoked about a half a pack for about 4 yrs  Quite about 50 yrs ago   Alcohol use No    Drug use: No    Sexual activity: No      Comment: No known STD risk factors     Other Topics Concern    Not on file     Social History Narrative    Lives independently with spouse    No known risk factors    Denied:  Exercising regularly     Vitals:    04/03/18 1328   BP: 148/68   BP Location: Left arm   Patient Position: Sitting   Cuff Size: Standard   Pulse: 79   Resp: 16   SpO2: 98%   Weight: 95 3 kg (210 lb 3 2 oz)   Height: 5' 1 5" (1 562 m)     Results for orders placed or performed in visit on 12/11/17   Iron   Result Value Ref Range    Iron 51 50 - 170 ug/dL   TIBC   Result Value Ref Range    TIBC 403 250 - 450 ug/dL   Ferritin   Result Value Ref Range    Ferritin 19 8 - 388 ng/mL     Weight (last 2 days)     Date/Time   Weight    04/03/18 1328  95 3 (210 2)            Body mass index is 39 07 kg/m²  BP      Temp      Pulse     Resp      SpO2        Vitals:    04/03/18 1328   Weight: 95 3 kg (210 lb 3 2 oz)     Vitals:    04/03/18 1328   Weight: 95 3 kg (210 lb 3 2 oz)        Physical Exam   Constitutional: She appears well-developed and well-nourished  HENT:   Head: Normocephalic  Mouth/Throat: Oropharynx is clear and moist    Eyes: Conjunctivae are normal  Pupils are equal, round, and reactive to light  Right eye exhibits no discharge  Left eye exhibits no discharge   No scleral icterus  Neck: Neck supple  Cardiovascular: Normal rate, regular rhythm, normal heart sounds and intact distal pulses  Exam reveals no gallop and no friction rub  No murmur heard  Pulmonary/Chest: Breath sounds normal  No respiratory distress  She has no wheezes  She has no rales  Abdominal: Soft  Bowel sounds are normal  She exhibits no distension and no mass  There is no tenderness  There is no rebound and no guarding  Musculoskeletal: She exhibits no edema or deformity  Lymphadenopathy:     She has no cervical adenopathy  Neurological: She is alert  Coordination normal    Psychiatric: Her mood appears anxious  She exhibits a depressed mood  She expresses no suicidal ideation     Frustrated

## 2018-04-11 ENCOUNTER — OFFICE VISIT (OUTPATIENT)
Dept: BEHAVIORAL/MENTAL HEALTH CLINIC | Facility: CLINIC | Age: 73
End: 2018-04-11
Payer: COMMERCIAL

## 2018-04-11 DIAGNOSIS — F41.9 ANXIETY AND DEPRESSION: Primary | ICD-10-CM

## 2018-04-11 DIAGNOSIS — F32.A ANXIETY AND DEPRESSION: Primary | ICD-10-CM

## 2018-04-11 PROCEDURE — 90834 PSYTX W PT 45 MINUTES: CPT | Performed by: SOCIAL WORKER

## 2018-04-11 NOTE — PSYCH
Assessment/Plan:      There are no diagnoses linked to this encounter  Subjective:     Patient ID: Josefina Multani is a 71 y o  female  Met with Isabella Hawkins from 2:00PM-2:45PM  She has been feeling increasingly overwhelmed and depressed in response to mother's behavior and effect it continues to have on her marriage, with her children, etc  Has been struggling with a decrease in energy and motivation  She is verbal, cooperative and well oriented during session  Review of Systems   Psychiatric/Behavioral: Positive for dysphoric mood  The patient is nervous/anxious  Objective:     Physical Exam   Psychiatric: Her speech is normal and behavior is normal  Judgment and thought content normal  Her mood appears anxious  Cognition and memory are normal  She exhibits a depressed mood  Isabella Hawkins anxious and depressed dealing with stressful situation of having mother live with her for last 21 years and her refusal to participate in any activities or programs separate from Isabella Hawkins has good support system via , children and grandchildren   No SI

## 2018-04-11 NOTE — PATIENT INSTRUCTIONS
Provided supportive therapy  Reviewed assertiveness strategies to utilize with mother although mother will not xchnage  Have encouraged Andrés Alvarez to increase activity/time separate from her  Reviewed stress mgmt strategies to utilize  Will continue to meet on PRN basis

## 2018-04-16 LAB
ALBUMIN SERPL-MCNC: 3.7 G/DL (ref 3.6–5.1)
ALBUMIN/CREAT UR: 49 MCG/MG CREAT
ALBUMIN/GLOB SERPL: 1.1 (CALC) (ref 1–2.5)
ALP SERPL-CCNC: 124 U/L (ref 33–130)
ALT SERPL-CCNC: 15 U/L (ref 6–29)
AST SERPL-CCNC: 16 U/L (ref 10–35)
BILIRUB SERPL-MCNC: 0.5 MG/DL (ref 0.2–1.2)
BUN SERPL-MCNC: 19 MG/DL (ref 7–25)
BUN/CREAT SERPL: 14 (CALC) (ref 6–22)
CALCIUM SERPL-MCNC: 9.2 MG/DL (ref 8.6–10.4)
CHLORIDE SERPL-SCNC: 102 MMOL/L (ref 98–110)
CO2 SERPL-SCNC: 22 MMOL/L (ref 20–31)
CREAT SERPL-MCNC: 1.36 MG/DL (ref 0.6–0.93)
CREAT UR-MCNC: 117 MG/DL (ref 20–320)
ERYTHROCYTE [DISTWIDTH] IN BLOOD BY AUTOMATED COUNT: 15.6 % (ref 11–15)
FERRITIN SERPL-MCNC: 16 NG/ML (ref 20–288)
GLOBULIN SER CALC-MCNC: 3.3 G/DL (CALC) (ref 1.9–3.7)
GLUCOSE SERPL-MCNC: 190 MG/DL (ref 65–99)
HBA1C MFR BLD: 9.7 % OF TOTAL HGB
HCT VFR BLD AUTO: 31.5 % (ref 35–45)
HGB BLD-MCNC: 9.9 G/DL (ref 11.7–15.5)
IRON SATN MFR SERPL: 11 % (CALC) (ref 11–50)
IRON SERPL-MCNC: 42 MCG/DL (ref 45–160)
MCH RBC QN AUTO: 26.9 PG (ref 27–33)
MCHC RBC AUTO-ENTMCNC: 31.4 G/DL (ref 32–36)
MCV RBC AUTO: 85.6 FL (ref 80–100)
MICROALBUMIN UR-MCNC: 5.7 MG/DL
PLATELET # BLD AUTO: 273 THOUSAND/UL (ref 140–400)
PMV BLD REES-ECKER: 11 FL (ref 7.5–12.5)
POTASSIUM SERPL-SCNC: 4.8 MMOL/L (ref 3.5–5.3)
PROT SERPL-MCNC: 7 G/DL (ref 6.1–8.1)
RBC # BLD AUTO: 3.68 MILLION/UL (ref 3.8–5.1)
SL AMB EGFR AFRICAN AMERICAN: 45 ML/MIN/1.73M2
SL AMB EGFR NON AFRICAN AMERICAN: 39 ML/MIN/1.73M2
SODIUM SERPL-SCNC: 135 MMOL/L (ref 135–146)
TIBC SERPL-MCNC: 392 MCG/DL (CALC) (ref 250–450)
WBC # BLD AUTO: 7.8 THOUSAND/UL (ref 3.8–10.8)

## 2018-04-17 ENCOUNTER — OFFICE VISIT (OUTPATIENT)
Dept: INTERNAL MEDICINE CLINIC | Facility: CLINIC | Age: 73
End: 2018-04-17
Payer: COMMERCIAL

## 2018-04-17 VITALS
OXYGEN SATURATION: 97 % | BODY MASS INDEX: 38.59 KG/M2 | SYSTOLIC BLOOD PRESSURE: 144 MMHG | WEIGHT: 207.6 LBS | TEMPERATURE: 98.8 F | DIASTOLIC BLOOD PRESSURE: 58 MMHG | HEART RATE: 86 BPM

## 2018-04-17 DIAGNOSIS — J22 ACUTE RESPIRATORY INFECTION: Primary | ICD-10-CM

## 2018-04-17 DIAGNOSIS — R74.8 ELEVATED CREATINE KINASE: Primary | ICD-10-CM

## 2018-04-17 PROCEDURE — 99213 OFFICE O/P EST LOW 20 MIN: CPT | Performed by: NURSE PRACTITIONER

## 2018-04-17 RX ORDER — INSULIN ASPART 100 [IU]/ML
INJECTION, SOLUTION INTRAVENOUS; SUBCUTANEOUS
COMMUNITY
Start: 2018-04-06 | End: 2018-04-17 | Stop reason: SDUPTHER

## 2018-04-17 RX ORDER — AMOXICILLIN 500 MG/1
500 TABLET, FILM COATED ORAL 3 TIMES DAILY
Qty: 21 TABLET | Refills: 0 | Status: SHIPPED | OUTPATIENT
Start: 2018-04-17 | End: 2018-04-24 | Stop reason: SDUPTHER

## 2018-04-17 NOTE — PROGRESS NOTES
Assessment/Plan:     Diagnoses and all orders for this visit:    Acute respiratory infection  -     amoxicillin (AMOXIL) 500 MG tablet; Take 1 tablet (500 mg total) by mouth 3 (three) times a day for 7 days    Other orders  -     Discontinue: NOVOLOG FLEXPEN 100 UNIT/ML SOPN;           Subjective:      Patient ID: Alfredia Holter is a 71 y o  female  Cough   This is a new problem  The current episode started in the past 7 days  The cough is non-productive  Associated symptoms include nasal congestion and a sore throat  The following portions of the patient's history were reviewed and updated as appropriate: allergies, current medications, past family history, past medical history, past social history, past surgical history and problem list     Review of Systems   Constitutional: Negative  HENT: Positive for sore throat  Eyes: Negative  Respiratory: Positive for cough  Cardiovascular: Negative  Gastrointestinal: Negative  Musculoskeletal: Negative  Neurological: Negative  Objective:      /58   Pulse 86   Temp 98 8 °F (37 1 °C) (Oral)   Wt 94 2 kg (207 lb 9 6 oz)   SpO2 97%   BMI 38 59 kg/m²          Physical Exam   Constitutional: She is oriented to person, place, and time  She appears well-developed and well-nourished  HENT:   Head: Normocephalic and atraumatic  Eyes: Conjunctivae are normal  Pupils are equal, round, and reactive to light  Neck: Normal range of motion  Neck supple  Cardiovascular: Normal rate and regular rhythm  Pulmonary/Chest: Effort normal and breath sounds normal    Abdominal: Soft  Bowel sounds are normal    Musculoskeletal: Normal range of motion  Neurological: She is alert and oriented to person, place, and time  Skin: Skin is warm and dry

## 2018-04-18 DIAGNOSIS — E61.1 IRON DEFICIENCY: Primary | ICD-10-CM

## 2018-04-24 ENCOUNTER — APPOINTMENT (EMERGENCY)
Dept: RADIOLOGY | Facility: HOSPITAL | Age: 73
End: 2018-04-24
Payer: COMMERCIAL

## 2018-04-24 ENCOUNTER — OFFICE VISIT (OUTPATIENT)
Dept: INTERNAL MEDICINE CLINIC | Facility: CLINIC | Age: 73
End: 2018-04-24
Payer: COMMERCIAL

## 2018-04-24 ENCOUNTER — HOSPITAL ENCOUNTER (EMERGENCY)
Facility: HOSPITAL | Age: 73
Discharge: HOME | End: 2018-04-24
Attending: EMERGENCY MEDICINE | Admitting: EMERGENCY MEDICINE
Payer: COMMERCIAL

## 2018-04-24 VITALS
WEIGHT: 204.4 LBS | DIASTOLIC BLOOD PRESSURE: 62 MMHG | SYSTOLIC BLOOD PRESSURE: 182 MMHG | OXYGEN SATURATION: 97 % | RESPIRATION RATE: 20 BRPM | HEIGHT: 61 IN | BODY MASS INDEX: 38.59 KG/M2 | HEART RATE: 95 BPM | TEMPERATURE: 96.7 F

## 2018-04-24 VITALS
DIASTOLIC BLOOD PRESSURE: 70 MMHG | TEMPERATURE: 97.5 F | OXYGEN SATURATION: 95 % | RESPIRATION RATE: 22 BRPM | WEIGHT: 207.23 LBS | SYSTOLIC BLOOD PRESSURE: 167 MMHG | BODY MASS INDEX: 38.9 KG/M2 | HEART RATE: 67 BPM

## 2018-04-24 DIAGNOSIS — J40 BRONCHITIS: Primary | ICD-10-CM

## 2018-04-24 DIAGNOSIS — R09.02 HYPOXIA: ICD-10-CM

## 2018-04-24 DIAGNOSIS — R05.9 COUGH: ICD-10-CM

## 2018-04-24 DIAGNOSIS — R44.3 HALLUCINATION: ICD-10-CM

## 2018-04-24 DIAGNOSIS — R06.2 WHEEZING: Primary | ICD-10-CM

## 2018-04-24 LAB
ALBUMIN SERPL BCP-MCNC: 3.2 G/DL (ref 3.5–5)
ALP SERPL-CCNC: 129 U/L (ref 46–116)
ALT SERPL W P-5'-P-CCNC: 31 U/L (ref 12–78)
ANION GAP SERPL CALCULATED.3IONS-SCNC: 11 MMOL/L (ref 4–13)
APTT PPP: 28 SECONDS (ref 23–35)
AST SERPL W P-5'-P-CCNC: 26 U/L (ref 5–45)
ATRIAL RATE: 78 BPM
BASOPHILS # BLD AUTO: 0.01 THOUSANDS/ΜL (ref 0–0.1)
BASOPHILS NFR BLD AUTO: 0 % (ref 0–1)
BILIRUB SERPL-MCNC: 0.3 MG/DL (ref 0.2–1)
BUN SERPL-MCNC: 23 MG/DL (ref 5–25)
CALCIUM SERPL-MCNC: 9.2 MG/DL (ref 8.3–10.1)
CHLORIDE SERPL-SCNC: 103 MMOL/L (ref 100–108)
CO2 SERPL-SCNC: 22 MMOL/L (ref 21–32)
CREAT SERPL-MCNC: 1.42 MG/DL (ref 0.6–1.3)
EOSINOPHIL # BLD AUTO: 0.08 THOUSAND/ΜL (ref 0–0.61)
EOSINOPHIL NFR BLD AUTO: 1 % (ref 0–6)
ERYTHROCYTE [DISTWIDTH] IN BLOOD BY AUTOMATED COUNT: 15.4 % (ref 11.6–15.1)
GFR SERPL CREATININE-BSD FRML MDRD: 37 ML/MIN/1.73SQ M
GLUCOSE SERPL-MCNC: 177 MG/DL (ref 65–140)
HCT VFR BLD AUTO: 30.6 % (ref 34.8–46.1)
HGB BLD-MCNC: 9.9 G/DL (ref 11.5–15.4)
INR PPP: 0.96 (ref 0.86–1.16)
LYMPHOCYTES # BLD AUTO: 2.12 THOUSANDS/ΜL (ref 0.6–4.47)
LYMPHOCYTES NFR BLD AUTO: 27 % (ref 14–44)
MCH RBC QN AUTO: 26.6 PG (ref 26.8–34.3)
MCHC RBC AUTO-ENTMCNC: 32.4 G/DL (ref 31.4–37.4)
MCV RBC AUTO: 82 FL (ref 82–98)
MONOCYTES # BLD AUTO: 0.51 THOUSAND/ΜL (ref 0.17–1.22)
MONOCYTES NFR BLD AUTO: 7 % (ref 4–12)
NEUTROPHILS # BLD AUTO: 5.13 THOUSANDS/ΜL (ref 1.85–7.62)
NEUTS SEG NFR BLD AUTO: 65 % (ref 43–75)
NT-PROBNP SERPL-MCNC: 46 PG/ML
P AXIS: 73 DEGREES
PLATELET # BLD AUTO: 262 THOUSANDS/UL (ref 149–390)
PMV BLD AUTO: 10.3 FL (ref 8.9–12.7)
POTASSIUM SERPL-SCNC: 5.6 MMOL/L (ref 3.5–5.3)
PR INTERVAL: 166 MS
PROT SERPL-MCNC: 8.1 G/DL (ref 6.4–8.2)
PROTHROMBIN TIME: 13.1 SECONDS (ref 12.1–14.4)
QRS AXIS: -7 DEGREES
QRSD INTERVAL: 92 MS
QT INTERVAL: 390 MS
QTC INTERVAL: 429 MS
RBC # BLD AUTO: 3.72 MILLION/UL (ref 3.81–5.12)
SODIUM SERPL-SCNC: 136 MMOL/L (ref 136–145)
T WAVE AXIS: 119 DEGREES
TROPONIN I SERPL-MCNC: <0.02 NG/ML
VENTRICULAR RATE: 73 BPM
WBC # BLD AUTO: 7.85 THOUSAND/UL (ref 4.31–10.16)

## 2018-04-24 PROCEDURE — 83880 ASSAY OF NATRIURETIC PEPTIDE: CPT | Performed by: PHYSICIAN ASSISTANT

## 2018-04-24 PROCEDURE — 94640 AIRWAY INHALATION TREATMENT: CPT | Performed by: INTERNAL MEDICINE

## 2018-04-24 PROCEDURE — 85610 PROTHROMBIN TIME: CPT | Performed by: PHYSICIAN ASSISTANT

## 2018-04-24 PROCEDURE — 87040 BLOOD CULTURE FOR BACTERIA: CPT | Performed by: PHYSICIAN ASSISTANT

## 2018-04-24 PROCEDURE — 71046 X-RAY EXAM CHEST 2 VIEWS: CPT

## 2018-04-24 PROCEDURE — 84484 ASSAY OF TROPONIN QUANT: CPT | Performed by: PHYSICIAN ASSISTANT

## 2018-04-24 PROCEDURE — 99285 EMERGENCY DEPT VISIT HI MDM: CPT

## 2018-04-24 PROCEDURE — 93010 ELECTROCARDIOGRAM REPORT: CPT | Performed by: INTERNAL MEDICINE

## 2018-04-24 PROCEDURE — 85730 THROMBOPLASTIN TIME PARTIAL: CPT | Performed by: PHYSICIAN ASSISTANT

## 2018-04-24 PROCEDURE — 93005 ELECTROCARDIOGRAM TRACING: CPT

## 2018-04-24 PROCEDURE — 80053 COMPREHEN METABOLIC PANEL: CPT | Performed by: PHYSICIAN ASSISTANT

## 2018-04-24 PROCEDURE — 36415 COLL VENOUS BLD VENIPUNCTURE: CPT | Performed by: PHYSICIAN ASSISTANT

## 2018-04-24 PROCEDURE — 94640 AIRWAY INHALATION TREATMENT: CPT

## 2018-04-24 PROCEDURE — 85025 COMPLETE CBC W/AUTO DIFF WBC: CPT | Performed by: PHYSICIAN ASSISTANT

## 2018-04-24 PROCEDURE — 99214 OFFICE O/P EST MOD 30 MIN: CPT | Performed by: INTERNAL MEDICINE

## 2018-04-24 PROCEDURE — 87147 CULTURE TYPE IMMUNOLOGIC: CPT | Performed by: PHYSICIAN ASSISTANT

## 2018-04-24 RX ORDER — AZITHROMYCIN 250 MG/1
500 TABLET, FILM COATED ORAL ONCE
Status: COMPLETED | OUTPATIENT
Start: 2018-04-24 | End: 2018-04-24

## 2018-04-24 RX ORDER — AZITHROMYCIN 250 MG/1
250 TABLET, FILM COATED ORAL DAILY
Qty: 4 TABLET | Refills: 0 | Status: SHIPPED | OUTPATIENT
Start: 2018-04-24 | End: 2018-04-28

## 2018-04-24 RX ORDER — ALBUTEROL SULFATE 2.5 MG/3ML
2.5 SOLUTION RESPIRATORY (INHALATION) ONCE
Status: COMPLETED | OUTPATIENT
Start: 2018-04-24 | End: 2018-04-24

## 2018-04-24 RX ORDER — AMOXICILLIN 500 MG/1
CAPSULE ORAL
Refills: 0 | COMMUNITY
Start: 2018-04-17 | End: 2018-04-24

## 2018-04-24 RX ADMIN — IPRATROPIUM BROMIDE 0.5 MG: 0.5 SOLUTION RESPIRATORY (INHALATION) at 12:30

## 2018-04-24 RX ADMIN — ALBUTEROL SULFATE 2.5 MG: 2.5 SOLUTION RESPIRATORY (INHALATION) at 12:30

## 2018-04-24 RX ADMIN — AZITHROMYCIN DIHYDRATE 500 MG: 250 TABLET, FILM COATED ORAL at 13:47

## 2018-04-24 NOTE — ED NOTES
Reviewed d/c instructions with pt  Pt states she has no questions at this time       Nehal Sesay RN  04/24/18 3769

## 2018-04-24 NOTE — ED PROVIDER NOTES
History  Chief Complaint   Patient presents with    Shortness of Breath     Pt c/o of increased SOB that started last Tuesday  Saw PCP and was given amoxocillin  Pt states she was not gettting better so stopped taking it  Pt states she saw PCP today and he thinks her URI is something worse  Pt sent here for maryal         61-year-old female presents to the emergency department with complaints of a cough with shortness of breath  States that she has had worsening symptoms over the past week  Initially seen by her primary care provider 1 week ago was started on amoxicillin but states that she was not feeling any better after 3 days and discontinue the medication  She denies fevers at home  States she was seen again by her primary care doctor today and had an albuterol nebulizer  States she had minimal improvement of her symptoms at that time  Denies chest pain  No sputum production  Currently being evaluated for COPD  History provided by:  Patient   used: No        Prior to Admission Medications   Prescriptions Last Dose Informant Patient Reported? Taking?    ALPRAZolam (XANAX) 0 5 mg tablet   No No   Sig: Take 1 tablet (0 5 mg total) by mouth 3 (three) times a day as needed for anxiety   Co-Enzyme Q-10 100 MG CAPS   Yes No   Sig: Take 1 capsule by mouth 2 (two) times a day     DULoxetine (CYMBALTA) 60 mg delayed release capsule   Yes No   Sig: Take 60 mg by mouth every morning   albuterol (PROAIR HFA) 90 mcg/act inhaler   Yes No   Sig: Inhale 2 puffs   amLODIPine (NORVASC) 2 5 mg tablet   Yes No   Sig: Take 2 5 mg by mouth 2 (two) times a day     amoxicillin (AMOXIL) 500 mg capsule   Yes No   Sig: TAKE 1 TABLET (500 MG TOTAL) BY MOUTH 3 (THREE) TIMES A DAY FOR 7 DAYS   aspirin 81 MG tablet   Yes No   Sig: Take 81 mg by mouth daily   atorvastatin (LIPITOR) 40 mg tablet   Yes No   Sig: Take 40 mg by mouth daily at bedtime     cholecalciferol (VITAMIN D3) 1,000 units tablet   Yes No Sig: Take 2,000 Units by mouth daily   enalapril (VASOTEC) 20 mg tablet   Yes No   Sig: Take 20 mg by mouth daily   enoxaparin (LOVENOX) 40 mg/0 4 mL   No No   Sig: Inject 0 4 mL under the skin daily for 13 days   famotidine (PEPCID) 20 mg tablet   No No   Sig: Take 1 tablet by mouth 2 (two) times a day for 30 days   insulin aspart (NovoLOG) 100 units/mL injection   No No   Sig: Inject 12 Units under the skin 3 (three) times a day before meals   insulin glargine (LANTUS) 100 units/mL subcutaneous injection   No No   Sig: Inject 40 Units under the skin 2 (two) times a day   isosorbide mononitrate (IMDUR) 30 mg 24 hr tablet   Yes No   Sig: TAKE 1 TABLET DAILY AT BEDTIME, 30 DAYS, #30, 4 REFILLS, EXTENDED RELEASE    levothyroxine 50 mcg tablet   Yes No   Sig: Take 50 mcg by mouth daily   metFORMIN (GLUCOPHAGE) 500 mg tablet   Yes No   Sig: Take 500 mg by mouth 2 (two) times a day with meals   metoprolol succinate (TOPROL-XL) 25 mg 24 hr tablet   Yes No   Sig: Take 25 mg by mouth daily   mupirocin (BACTROBAN) 2 % ointment   Yes No   Sig: APPLY TWICE A DAY   nitroglycerin (NITROLINGUAL) 0 4 mg/spray spray   Yes No   Sig: USE ONE SPRAY Q 5 MINUTES AS NEEDED FOR CHEST PAIN  IF NO RELIEF, CALL 911     ranolazine (RANEXA) 500 mg 12 hr tablet   Yes No   Sig: Take 500 mg by mouth 2 (two) times a day   sucralfate (CARAFATE) 1 g/10 mL suspension   No No   Sig: Take 10 mL by mouth every 6 (six) hours for 30 days   tiZANidine (ZANAFLEX) 2 mg tablet   Yes No   Sig: Take 2 mg by mouth daily at bedtime      Facility-Administered Medications: None       Past Medical History:   Diagnosis Date    Acute embolism and thrombosis of unspecified deep veins of unspecified lower extremity (HCC)     Last Assessed:  5/18/17    Anemia     Anosmia     Anxiety     Arthritis     Asthma     Back pain     Bilateral macular retinal edema     CAD (coronary artery disease)     Cataract     Cervical disc herniation     Cervical radiculopathy  Cervical spinal stenosis     Cervical spondylolysis     Chronic mastoiditis     Complex endometrial hyperplasia     Depression     Diabetes mellitus (HCC)     DVT (deep venous thrombosis) (HCC)     Fibromyalgia     Hyperlipidemia     Hypertension     Hypothyroid     Lumbar radiculopathy     Obese     Spinal stenosis     Stomach ulcer     Thyroid disease        Past Surgical History:   Procedure Laterality Date    BACK SURGERY      CARPAL TUNNEL RELEASE      CATARACT EXTRACTION      CHOLECYSTECTOMY      COLONOSCOPY      CORONARY ANGIOPLASTY      CORONARY ARTERY BYPASS GRAFT      CYSTOSCOPY N/A 6/20/2017    Procedure: Josue Pennington;  Surgeon: Blanca Forman MD;  [facility]: [facility];  Service: Gynecology Oncology    DC LAPAROSCOPY W TOT HYSTERECTUTERUS <=250 GRAM  W TUBE/OVARY N/A 6/20/2017    Procedure: ROBOTIC HYSTERECTOMY; BILATERAL SALPINO-OOPHERECTOMY; umbilical hernia repair ;  Surgeon: Blanca Forman MD;  Location: [facility];  Service: Gynecology Oncology    TONSILECTOMY AND ADNOIDECTOMY      UMBILICAL HERNIA REPAIR         Family History   Problem Relation Age of Onset    Arthritis Mother     Leukemia Mother     Other Mother      Anxiety, major depressive disorder, recurrent episode with atypical features    Coronary artery disease Father      Heart problem    Diabetes Father     Other Father      Infectious disease    Alzheimer's disease Maternal Grandmother     Other Maternal Grandfather      Heart problem    Other Daughter      Anxiety, major depressive disorder, recurrent episode with atypical features    Alcohol abuse Other      Grandparent    Cancer Family     Diabetes Family     Hypertension Family     Other Family      Reported prior back trouble, thyroid disorder     I have reviewed and agree with the history as documented      Social History   Substance Use Topics    Smoking status: Former Smoker     Years: 10 00    Smokeless tobacco: Former User Comment: Smoked about a half a pack for about 4 yrs  Quite about 50 yrs ago   Alcohol use No        Review of Systems   Constitutional: Negative for activity change, appetite change, chills and fever  HENT: Negative for congestion, dental problem, drooling, ear discharge, ear pain, mouth sores, nosebleeds, rhinorrhea, sore throat and trouble swallowing  Eyes: Negative for pain, discharge and itching  Respiratory: Positive for cough and shortness of breath  Negative for chest tightness and wheezing  Cardiovascular: Negative for chest pain and palpitations  Gastrointestinal: Negative for abdominal pain, blood in stool, constipation, diarrhea, nausea and vomiting  Endocrine: Negative for cold intolerance and heat intolerance  Genitourinary: Negative for difficulty urinating, dysuria, flank pain, frequency and urgency  Skin: Negative for rash and wound  Allergic/Immunologic: Negative for food allergies and immunocompromised state  Neurological: Negative for dizziness, seizures, syncope, weakness, numbness and headaches  Psychiatric/Behavioral: Negative for agitation, behavioral problems and confusion  Physical Exam  ED Triage Vitals   Temperature Pulse Respirations Blood Pressure SpO2   04/24/18 1051 04/24/18 1049 04/24/18 1049 04/24/18 1049 04/24/18 1049   97 5 °F (36 4 °C) 88 22 (!) 175/75 98 %      Temp Source Heart Rate Source Patient Position - Orthostatic VS BP Location FiO2 (%)   04/24/18 1051 04/24/18 1049 04/24/18 1049 04/24/18 1049 --   Oral Monitor Sitting Right arm       Pain Score       04/24/18 1049       6           Orthostatic Vital Signs  Vitals:    04/24/18 1049 04/24/18 1159   BP: (!) 175/75 167/70   Pulse: 88 67   Patient Position - Orthostatic VS: Sitting Sitting       Physical Exam   Constitutional: She is oriented to person, place, and time  She appears well-developed and well-nourished  No distress  HENT:   Head: Normocephalic and atraumatic     Right Ear: External ear normal    Left Ear: External ear normal    Mouth/Throat: Oropharynx is clear and moist  No oropharyngeal exudate  Eyes: Conjunctivae are normal    Neck: No JVD present  No tracheal deviation present  Cardiovascular: Normal rate, regular rhythm and normal heart sounds  Exam reveals no gallop and no friction rub  No murmur heard  Pulmonary/Chest: Effort normal  No respiratory distress  She has wheezes  She has rhonchi  She has no rales  She exhibits no tenderness  Abdominal: Soft  Bowel sounds are normal  She exhibits no distension  There is no tenderness  There is no guarding  Musculoskeletal: Normal range of motion  She exhibits no edema, tenderness or deformity  Lymphadenopathy:     She has no cervical adenopathy  Neurological: She is alert and oriented to person, place, and time  Skin: Skin is warm and dry  No rash noted  She is not diaphoretic  No erythema  Psychiatric: She has a normal mood and affect  Her behavior is normal    Nursing note and vitals reviewed  ED Medications  Medications   azithromycin (ZITHROMAX) tablet 500 mg (not administered)   albuterol inhalation solution 2 5 mg (2 5 mg Nebulization Given 4/24/18 1230)   ipratropium (ATROVENT) 0 02 % inhalation solution 0 5 mg (0 5 mg Nebulization Given 4/24/18 1230)       Diagnostic Studies  Results Reviewed     Procedure Component Value Units Date/Time    B-type natriuretic peptide [[id number]]  (Normal) Collected:  04/24/18 1134    Lab Status:  Final result Specimen:  Blood from Arm, Right Updated:  04/24/18 1211     NT-proBNP 46 pg/mL     Blood culture #1 [[id number]] Collected:  04/24/18 1158    Lab Status:   In process Specimen:  Blood from Arm, Left Updated:  04/24/18 1206    Troponin I [[id number]]  (Normal) Collected:  04/24/18 1134    Lab Status:  Final result Specimen:  Blood from Arm, Right Updated:  04/24/18 1204     Troponin I <0 02 ng/mL     Narrative:         [employer] Chemistry analyzer 99% cutoff is > 0 04 ng/mL in network labs    o cTnI 99% cutoff is useful only when applied to patients in the clinical setting of myocardial ischemia  o cTnI 99% cutoff should be interpreted in the context of clinical history, ECG findings and possibly cardiac imaging to establish correct diagnosis  o cTnI 99% cutoff may be suggestive but clearly not indicative of a coronary event without the clinical setting of myocardial ischemia  Comprehensive metabolic panel [[id number]]  (Abnormal) Collected:  04/24/18 1134    Lab Status:  Final result Specimen:  Blood from Arm, Right Updated:  04/24/18 1203     Sodium 136 mmol/L      Potassium 5 6 (H) mmol/L      Chloride 103 mmol/L      CO2 22 mmol/L      Anion Gap 11 mmol/L      BUN 23 mg/dL      Creatinine 1 42 (H) mg/dL      Glucose 177 (H) mg/dL      Calcium 9 2 mg/dL      AST 26 U/L      ALT 31 U/L      Alkaline Phosphatase 129 (H) U/L      Total Protein 8 1 g/dL      Albumin 3 2 (L) g/dL      Total Bilirubin 0 30 mg/dL      eGFR 37 ml/min/1 73sq m     Narrative:         National Kidney Disease Education Program recommendations are as follows:  GFR calculation is accurate only with a steady state creatinine  Chronic Kidney disease less than 60 ml/min/1 73 sq  meters  Kidney failure less than 15 ml/min/1 73 sq  meters      Josie Kassy [[id number]]  (Normal) Collected:  04/24/18 1134    Lab Status:  Final result Specimen:  Blood from Arm, Right Updated:  04/24/18 1155     Protime 13 1 seconds      INR 0 96    APTT [[id number]]  (Normal) Collected:  04/24/18 1134    Lab Status:  Final result Specimen:  Blood from Arm, Right Updated:  04/24/18 1155     PTT 28 seconds     CBC and differential [[id number]]  (Abnormal) Collected:  04/24/18 1134    Lab Status:  Final result Specimen:  Blood from Arm, Right Updated:  04/24/18 1145     WBC 7 85 Thousand/uL      RBC 3 72 (L) Million/uL      Hemoglobin 9 9 (L) g/dL      Hematocrit 30 6 (L) %      MCV 82 fL      MCH 26 6 (L) pg      MCHC 32 4 g/dL      RDW 15  4 (H) %      MPV 10 3 fL      Platelets 416 Thousands/uL      Neutrophils Relative 65 %      Lymphocytes Relative 27 %      Monocytes Relative 7 %      Eosinophils Relative 1 %      Basophils Relative 0 %      Neutrophils Absolute 5 13 Thousands/µL      Lymphocytes Absolute 2 12 Thousands/µL      Monocytes Absolute 0 51 Thousand/µL      Eosinophils Absolute 0 08 Thousand/µL      Basophils Absolute 0 01 Thousands/µL     Blood culture #2 [[id number]] Collected:  04/24/18 1134    Lab Status: In process Specimen:  Blood from Arm, Right Updated:  04/24/18 1140                 XR chest 2 views   ED Interpretation by Christoph Wood PA-C (04/24 1155)   No focal consolidation      Final Result by Colten Phealn MD (04/24 1159)      No acute cardiopulmonary disease  Workstation performed: [id number]                    Procedures  ECG 12 Lead Documentation  Date/Time: 4/24/2018 11:52 AM  Performed by: Gaviota Baiely by: KIM VARGAS     Indications / Diagnosis:  SOB  ECG reviewed by me, the ED Provider: yes    Patient location:  ED  Previous ECG:     Previous ECG:  Compared to current    Comparison ECG info:  6/6/17    Similarity:  No change  Interpretation:     Interpretation: abnormal    Rate:     ECG rate:  74    ECG rate assessment: normal    Rhythm:     Rhythm: sinus rhythm    Ectopy:     Ectopy: none    QRS:     QRS axis:  Normal    QRS intervals:  Normal  Conduction:     Conduction: abnormal      Abnormal conduction comment:  Sinus arrhythmia  ST segments:     ST segments:  Normal  T waves:     T waves: non-specific             Phone Contacts  ED Phone Contact    ED Course  ED Course as of Apr 24 1323   Tue Apr 24, 2018   1317  Discussed test results with patient  Feeling slightly improved after nebulizer treatment  Will discharge to home on oral antibiotics for bronchitis                                  MDM  Number of Diagnoses or Management Options  Bronchitis:   Diagnosis management comments:   Differential diagnosis includes but not limited to:    Bronchitis, pneumonia, COPD,  CHF  Amount and/or Complexity of Data Reviewed  Clinical lab tests: reviewed and ordered  Tests in the radiology section of CPT®: ordered and reviewed  Independent visualization of images, tracings, or specimens: yes      CritCare Time    Disposition  Final diagnoses:   Bronchitis     Time reflects when diagnosis was documented in both MDM as applicable and the Disposition within this note     Time User Action Codes Description Comment    4/24/2018  1:18 PM Pepe Hall Add [J40] Bronchitis       ED Disposition     ED Disposition Condition Comment    Discharge  Nam Rossit discharge to home/self care  Condition at discharge: Stable        Follow-up Information     Follow up With Specialties Details Why [address]  [facility], DO Internal Medicine Schedule an appointment as soon as possible for a visit  [address]  [phone]          Patient's Medications   Discharge Prescriptions    AZITHROMYCIN (ZITHROMAX) 250 MG TABLET    Take 1 tablet (250 mg total) by mouth daily for 4 days       Start Date: 4/24/2018 End Date: 4/28/2018       Order Dose: 250 mg       Quantity: 4 tablet    Refills: 0     No discharge procedures on file      ED Provider  Electronically Signed by           Karuna Larwence PA-C  04/24/18 1170

## 2018-04-24 NOTE — PROGRESS NOTES
Assessment/Plan:     drive her there  Diagnoses and all orders for this visit:    Wheezing  Comments:  Hypoxia, cough worsening of symptoms he had a received a nebulizer treatment in the office and continue to wheeze, low peak flows in the 200s go to the ER    Hypoxia  Comments:   will drive the patient to the ER for evaluation, ER notified; patient at rest did not have hypoxia high but with ambulation she dropped down to 90-91%    Hallucination  Comments:  ?  Secondary to hypoxia/delirium will have patient seen in the emergency room    Cough  Comments:  Rule out pneumonia will have the patient evaluated in the ER, ER notified    Other orders  -     Discontinue: amoxicillin (AMOXIL) 500 mg capsule; TAKE 1 TABLET (500 MG TOTAL) BY MOUTH 3 (THREE) TIMES A DAY FOR 7 DAYS        The patient call after ER evaluation set up a follow-up visit  Subjective:      Patient ID: María Frias is a 71 y o  female  HPI 71-year-old female coming in with chief complaint of worsening cough, shortness of breath, wheezing and hallucinations auditory; the pt reports worsening cough , wheezing , nasal congestion ,  Talking in sleep, strange dreams , and strange thought laying ,  [place] crackling in the breath and thought she could hear people talking and that got better and went to the nose   Some times sob with talking , talking on the phone,  Not able to get the mucous out  [address] daughter was sick  Tried amoxil not helping , mucinex 600mg bid,  Tried claritin, temp 101 5 wed or thurs then went down to 994  The patient does report me auditory hallucinations at nighttime since the cold has started  The patient reports me that her symptoms initially got better while on amoxicillin but she had stopped the medication in her symptoms had started and increased      The following portions of the patient's history were reviewed and updated as appropriate: allergies, current medications, past family history, past social history, past surgical history and problem list     Review of Systems   Constitutional: Positive for chills, fatigue and fever  Arthralgia , body aches   HENT: Negative for sinus pressure and sore throat  Respiratory: Positive for cough, shortness of breath (mild ) and wheezing (improving)  Gastrointestinal: Positive for diarrhea (initially when it started)  Musculoskeletal: Positive for arthralgias  Neurological: Positive for headaches (for several days initially 1 week ago but now improving)  Negative for light-headedness  Psychiatric/Behavioral: Negative for sleep disturbance and suicidal ideas  Hearing people talking auditory         Objective:      BP (!) 182/62 (BP Location: Left arm, Patient Position: Sitting, Cuff Size: Large)   Pulse 95   Temp (!) 96 7 °F (35 9 °C) (Tympanic)   Resp 20   Ht 5' 1 2" (1 554 m)   Wt 92 7 kg (204 lb 6 4 oz)   SpO2 97%   BMI 38 37 kg/m²                       No Follow-up on file        Allergies   Allergen Reactions    Pollen Extract Other (See Comments)     Cold symptoms         Past Medical History:   Diagnosis Date    Acute embolism and thrombosis of unspecified deep veins of unspecified lower extremity (HCC)     Last Assessed:  5/18/17    Anemia     Anosmia     Anxiety     Arthritis     Asthma     Back pain     Bilateral macular retinal edema     CAD (coronary artery disease)     Cataract     Cervical disc herniation     Cervical radiculopathy     Cervical spinal stenosis     Cervical spondylolysis     Chronic mastoiditis     Complex endometrial hyperplasia     Depression     Diabetes mellitus ([address] )     DVT (deep venous thrombosis) (HCC)     Fibromyalgia     Hyperlipidemia     Hypertension     Hypothyroid     Lumbar radiculopathy     Obese     Spinal stenosis     Stomach ulcer     Thyroid disease      Past Surgical History:   Procedure Laterality Date    BACK SURGERY      CARPAL TUNNEL RELEASE      CATARACT EXTRACTION      CHOLECYSTECTOMY      COLONOSCOPY      CORONARY ANGIOPLASTY      CORONARY ARTERY BYPASS GRAFT      CYSTOSCOPY N/A 6/20/2017    Procedure: CYSTOSCOPY;  Surgeon: Sophia Paul MD;  [facility]: [facility];  Service: Gynecology Oncology    PA LAPAROSCOPY W TOT HYSTERECTUTERUS <=250 GRAM  W TUBE/OVARY N/A 6/20/2017    Procedure: ROBOTIC HYSTERECTOMY; BILATERAL SALPINO-OOPHERECTOMY; umbilical hernia repair ;  Surgeon: Sophia Paul MD;  [facility];  Service: Gynecology Oncology    TONSILECTOMY AND ADNOIDECTOMY      UMBILICAL HERNIA REPAIR       Current Outpatient Prescriptions on File Prior to Visit   Medication Sig Dispense Refill    ALPRAZolam (XANAX) 0 5 mg tablet Take 1 tablet (0 5 mg total) by mouth 3 (three) times a day as needed for anxiety 90 tablet 0    amLODIPine (NORVASC) 2 5 mg tablet Take 2 5 mg by mouth 2 (two) times a day        aspirin 81 MG tablet Take 81 mg by mouth daily      atorvastatin (LIPITOR) 40 mg tablet Take 40 mg by mouth daily at bedtime        cholecalciferol (VITAMIN D3) 1,000 units tablet Take 2,000 Units by mouth daily      Co-Enzyme Q-10 100 MG CAPS Take 1 capsule by mouth 2 (two) times a day        DULoxetine (CYMBALTA) 60 mg delayed release capsule Take 60 mg by mouth every morning      enalapril (VASOTEC) 20 mg tablet Take 20 mg by mouth daily      insulin aspart (NovoLOG) 100 units/mL injection Inject 12 Units under the skin 3 (three) times a day before meals  0    insulin glargine (LANTUS) 100 units/mL subcutaneous injection Inject 40 Units under the skin 2 (two) times a day 10 mL 0    isosorbide mononitrate (IMDUR) 30 mg 24 hr tablet TAKE 1 TABLET DAILY AT BEDTIME, 30 DAYS, #30, 4 REFILLS, EXTENDED RELEASE   6    levothyroxine 50 mcg tablet Take 50 mcg by mouth daily      metFORMIN (GLUCOPHAGE) 500 mg tablet Take 500 mg by mouth 2 (two) times a day with meals      metoprolol succinate (TOPROL-XL) 25 mg 24 hr tablet Take 25 mg by mouth daily  3    mupirocin (BACTROBAN) 2 % ointment APPLY TWICE A DAY  1    nitroglycerin (NITROLINGUAL) 0 4 mg/spray spray USE ONE SPRAY Q 5 MINUTES AS NEEDED FOR CHEST PAIN  IF NO RELIEF, CALL 911   1    tiZANidine (ZANAFLEX) 2 mg tablet Take 2 mg by mouth daily at bedtime      albuterol (PROAIR HFA) 90 mcg/act inhaler Inhale 2 puffs      enoxaparin (LOVENOX) 40 mg/0 4 mL Inject 0 4 mL under the skin daily for 13 days 5 2 mL 0    famotidine (PEPCID) 20 mg tablet Take 1 tablet by mouth 2 (two) times a day for 30 days 60 tablet 0    ranolazine (RANEXA) 500 mg 12 hr tablet Take 500 mg by mouth 2 (two) times a day      sucralfate (CARAFATE) 1 g/10 mL suspension Take 10 mL by mouth every 6 (six) hours for 30 days 420 mL 0    [DISCONTINUED] acebutolol (SECTRAL) 200 mg capsule Take 200 mg by mouth daily at bedtime      [DISCONTINUED] amoxicillin (AMOXIL) 500 MG tablet Take 1 tablet (500 mg total) by mouth 3 (three) times a day for 7 days 21 tablet 0     No current facility-administered medications on file prior to visit        Family History   Problem Relation Age of Onset    Arthritis Mother     Leukemia Mother     Other Mother      Anxiety, major depressive disorder, recurrent episode with atypical features    Coronary artery disease Father      Heart problem    Diabetes Father     Other Father      Infectious disease    Alzheimer's disease Maternal Grandmother     Other Maternal Grandfather      Heart problem    Other Daughter      Anxiety, major depressive disorder, recurrent episode with atypical features    Alcohol abuse Other      Grandparent    Cancer Family     Diabetes Family     Hypertension Family     Other Family      Reported prior back trouble, thyroid disorder     Social History     Social History    Marital status: /Civil Union     Spouse name: N/A    Number of children: 2    Years of education: High school or GED     Occupational History    Retired      Social History Main Topics    Smoking status: Former Smoker     Years: 10 00    Smokeless tobacco: Former User      Comment: Smoked about a half a pack for about 4 yrs  Quite about 50 yrs ago   Alcohol use No    Drug use: No    Sexual activity: No      Comment: No known STD risk factors     Other Topics Concern    Not on file     Social History Narrative    Lives independently with spouse    No known risk factors    Denied:  Exercising regularly     Vitals:    04/24/18 0807   BP: (!) 182/62   BP Location: Left arm   Patient Position: Sitting   Cuff Size: Large   Pulse: 95   Resp: 20   Temp: (!) 96 7 °F (35 9 °C)   TempSrc: Tympanic   SpO2: 97%   Weight: 92 7 kg (204 lb 6 4 oz)   Height: 5' 1 2" (1 554 m)     Results for orders placed or performed in visit on 04/13/18   TIBC   Result Value Ref Range    Iron, Serum 42 (L) 45 - 160 mcg/dL    Total Iron Binding Capacity (TIBC) 392 250 - 450 mcg/dL (calc)    Iron Saturation 11 11 - 50 % (calc)   Comprehensive metabolic panel   Result Value Ref Range    SL AMB GLUCOSE 190 (H) 65 - 99 mg/dL    BUN 19 7 - 25 mg/dL    Creatinine, Serum 1 36 (H) 0 60 - 0 93 mg/dL    eGFR Non  39 (L) > OR = 60 mL/min/1 73m2    SL AMB EGFR  45 (L) > OR = 60 mL/min/1 73m2    SL AMB BUN/CREATININE RATIO 14 6 - 22 (calc)    SL AMB SODIUM 135 135 - 146 mmol/L    SL AMB POTASSIUM 4 8 3 5 - 5 3 mmol/L    SL AMB CHLORIDE 102 98 - 110 mmol/L    SL AMB CARBON DIOXIDE 22 20 - 31 mmol/L    SL AMB CALCIUM 9 2 8 6 - 10 4 mg/dL    SL AMB PROTEIN, TOTAL 7 0 6 1 - 8 1 g/dL    Serum Albumin 3 7 3 6 - 5 1 g/dL    SL AMB GLOBULIN 3 3 1 9 - 3 7 g/dL (calc)    SL AMB ALBUMIN/GLOBULIN RATIO 1 1 1 0 - 2 5 (calc)    SL AMB BILIRUBIN, TOTAL 0 5 0 2 - 1 2 mg/dL    SL AMB ALKALINE PHOSPHATASE 124 33 - 130 U/L    SL AMB AST 16 10 - 35 U/L    SL AMB ALT 15 6 - 29 U/L   Microalbumin, Random Urine (W/Creatinine)   Result Value Ref Range    Creatinine, Urine 117 20 - 320 mg/dL    Microalbum  ,Marilyn Johnson 5 7 See Note: mg/dL    Microalb/Creat Ratio 49 (H) <30 mcg/mg creat   CBC   Result Value Ref Range    SL AMB LAB WHITE BLOOD CELL COUNT 7 8 3 8 - 10 8 Thousand/uL    SL AMB LAB RED BLOOD CELLS 3 68 (L) 3 80 - 5 10 Million/uL    Hemoglobin 9 9 (L) 11 7 - 15 5 g/dL    Hematocrit 31 5 (L) 35 0 - 45 0 %    MCV 85 6 80 0 - 100 0 fL    MCH 26 9 (L) 27 0 - 33 0 pg    MCHC 31 4 (L) 32 0 - 36 0 g/dL    RDW 15 6 (H) 11 0 - 15 0 %    Platelet Count 918 103 - 400 Thousand/uL    SL AMB MPV 11 0 7 5 - 12 5 fL    Always Message     Ferritin   Result Value Ref Range    Ferritin 16 (L) 20 - 288 ng/mL   Hemoglobin A1c   Result Value Ref Range    Hemoglobin A1C 9 7 (H) <5 7 % of total Hgb     Weight (last 2 days)     Date/Time   Weight    04/24/18 0807  92 7 (204 4)            Body mass index is 38 37 kg/m²  BP     Temp     Pulse     Resp      SpO2        Vitals:    04/24/18 0807   Weight: 92 7 kg (204 lb 6 4 oz)     Vitals:    04/24/18 0807   Weight: 92 7 kg (204 lb 6 4 oz)      Physical Exam   Constitutional: She appears well-developed and well-nourished  No distress  HENT:   Head: Normocephalic and atraumatic  Right Ear: External ear normal    Left Ear: External ear normal    Nose: Nose normal    Mouth/Throat: Oropharynx is clear and moist  No oropharyngeal exudate  Eyes: Conjunctivae and EOM are normal  Pupils are equal, round, and reactive to light  Right eye exhibits no discharge  Left eye exhibits no discharge  No scleral icterus  Cardiovascular: Normal heart sounds  No murmur heard  Pulmonary/Chest: No respiratory distress  She has wheezes (Bilateral wheezing)  She has no rales  Patient appears mildly short of breath with talking to me, peak flows low 200s after nebulizer treatment, patient continued to wheeze after nebulizer treatment with albuterol 0 083%    Lymphadenopathy:     She has no cervical adenopathy  Skin: She is not diaphoretic

## 2018-04-24 NOTE — DISCHARGE INSTRUCTIONS
Acute Bronchitis   WHAT YOU NEED TO KNOW:   Acute bronchitis is swelling and irritation in the air passages of your lungs  This irritation may cause you to cough or have other breathing problems  Acute bronchitis often starts because of another illness, such as a cold or the flu  The illness spreads from your nose and throat to your windpipe and airways  Bronchitis is often called a chest cold  Acute bronchitis lasts about 3 to 6 weeks and is usually not a serious illness  Your cough can last for several weeks  DISCHARGE INSTRUCTIONS:   Return to the emergency department if:   · You cough up blood  · Your lips or fingernails turn blue  · You feel like you are not getting enough air when you breathe  Contact your healthcare provider if:   · You have a fever  · Your breathing problems do not go away or get worse  · Your cough does not get better within 4 weeks  · You have questions or concerns about your condition or care  Self-care:   · Get more rest   Rest helps your body to heal  Slowly start to do more each day  Rest when you feel it is needed  · Avoid irritants in the air  Avoid chemicals, fumes, and dust  Wear a face mask if you must work around dust or fumes  Stay inside on days when air pollution levels are high  If you have allergies, stay inside when pollen counts are high  Do not use aerosol products, such as spray-on deodorant, bug spray, and hair spray  · Do not smoke or be around others who smoke  Nicotine and other chemicals in cigarettes and cigars damages the cilia that move mucus out of your lungs  Ask your healthcare provider for information if you currently smoke and need help to quit  E-cigarettes or smokeless tobacco still contain nicotine  Talk to your healthcare provider before you use these products  · Drink liquids as directed  Liquids help keep your air passages moist and help you cough up mucus   You may need to drink more liquids when you have acute bronchitis  Ask how much liquid to drink each day and which liquids are best for you  · Use a humidifier or vaporizer  Use a cool mist humidifier or a vaporizer to increase air moisture in your home  This may make it easier for you to breathe and help decrease your cough  Decrease risk for acute bronchitis:   · Get the vaccinations you need  Ask your healthcare provider if you should get vaccinated against the flu or pneumonia  · Prevent the spread of germs  You can decrease your risk of acute bronchitis and other illnesses by doing the following:     [facility] [place] [facility] your hands often with soap and water  Carry germ-killing hand lotion or gel with you  You can use the lotion or gel to clean your hands when soap and water are not available  ¨ Do not touch your eyes, nose, or mouth unless you have washed your hands first     ¨ Always cover your mouth when you cough to prevent the spread of germs  It is best to cough into a tissue or your shirt sleeve instead of into your hand  Ask those around you cover their mouths when they cough  ¨ Try to avoid people who have a cold or the flu  If you are sick, stay away from others as much as possible  Medicines: Your healthcare provider may  give you any of the following:  · Ibuprofen or acetaminophen  are medicines that help lower your fever  They are available without a doctor's order  Ask your healthcare provider which medicine is right for you  Ask how much to take and how often to take it  Follow directions  These medicines can cause stomach bleeding if not taken correctly  Ibuprofen can cause kidney damage  Do not take ibuprofen if you have kidney disease, an ulcer, or allergies to aspirin  Acetaminophen can cause liver damage  Do not take more than 4,000 milligrams in 24 hours  · Decongestants  help loosen mucus in your lungs and make it easier to cough up  This can help you breathe easier  · Cough suppressants  decrease your urge to cough   If your cough produces mucus, do not take a cough suppressant unless your healthcare provider tells you to  Your healthcare provider may suggest that you take a cough suppressant at night so you can rest     · Inhalers  may be given  Your healthcare provider may give you one or more inhalers to help you breathe easier and cough less  An inhaler gives your medicine to open your airways  Ask your healthcare provider to show you how to use your inhaler correctly  · Take your medicine as directed  Contact your healthcare provider if you think your medicine is not helping or if you have side effects  Tell him of her if you are allergic to any medicine  Keep a list of the medicines, vitamins, and herbs you take  Include the amounts, and when and why you take them  Bring the list or the pill bottles to follow-up visits  Carry your medicine list with you in case of an emergency  Follow up with your healthcare provider as directed:  Write down questions you have so you will remember to ask them during your follow-up visits  © 2017 [employer] Information is for End User's use only and may not be sold, redistributed or otherwise used for commercial purposes  All illustrations and images included in CareNotes® are the copyrighted property of [employer]  or Ahsan Quez[employer]  The above information is an  only  It is not intended as medical advice for individual conditions or treatments  Talk to your doctor, nurse or pharmacist before following any medical regimen to see if it is safe and effective for you

## 2018-04-25 ENCOUNTER — TELEPHONE (OUTPATIENT)
Dept: INTERNAL MEDICINE CLINIC | Facility: CLINIC | Age: 73
End: 2018-04-25

## 2018-04-25 NOTE — PROGRESS NOTES
Patient is aware of the abnormal blood cultures  She will return to have a recheck and a repeat blood culture

## 2018-04-25 NOTE — PROGRESS NOTES
Left message on patient's machine to call for the results  Patient will need to return for repeat blood culture

## 2018-04-27 LAB
BACTERIA BLD CULT: ABNORMAL
GRAM STN SPEC: ABNORMAL

## 2018-04-29 LAB — BACTERIA BLD CULT: NORMAL

## 2018-05-18 DIAGNOSIS — F41.9 ANXIETY: ICD-10-CM

## 2018-05-20 RX ORDER — ALPRAZOLAM 0.5 MG/1
0.5 TABLET ORAL 3 TIMES DAILY PRN
Qty: 90 TABLET | Refills: 1 | Status: SHIPPED | OUTPATIENT
Start: 2018-05-20 | End: 2018-08-27 | Stop reason: SDUPTHER

## 2018-05-21 ENCOUNTER — HOSPITAL ENCOUNTER (OUTPATIENT)
Dept: CT IMAGING | Facility: HOSPITAL | Age: 73
Discharge: HOME | End: 2018-05-21
Payer: COMMERCIAL

## 2018-05-21 DIAGNOSIS — R91.1 PULMONARY NODULE: ICD-10-CM

## 2018-05-21 PROCEDURE — 71250 CT THORAX DX C-: CPT

## 2018-05-22 ENCOUNTER — HOSPITAL ENCOUNTER (OUTPATIENT)
Dept: PULMONOLOGY | Facility: HOSPITAL | Age: 73
Discharge: HOME | End: 2018-05-22
Payer: COMMERCIAL

## 2018-05-22 DIAGNOSIS — R91.1 PULMONARY NODULE: ICD-10-CM

## 2018-05-22 PROCEDURE — 94010 BREATHING CAPACITY TEST: CPT | Performed by: INTERNAL MEDICINE

## 2018-05-22 PROCEDURE — 94010 BREATHING CAPACITY TEST: CPT

## 2018-05-22 PROCEDURE — 94760 N-INVAS EAR/PLS OXIMETRY 1: CPT

## 2018-05-24 ENCOUNTER — OFFICE VISIT (OUTPATIENT)
Dept: PULMONOLOGY | Facility: CLINIC | Age: 73
End: 2018-05-24
Payer: COMMERCIAL

## 2018-05-24 VITALS
BODY MASS INDEX: 38.87 KG/M2 | DIASTOLIC BLOOD PRESSURE: 56 MMHG | WEIGHT: 211.2 LBS | OXYGEN SATURATION: 96 % | TEMPERATURE: 97.1 F | HEIGHT: 62 IN | HEART RATE: 60 BPM | SYSTOLIC BLOOD PRESSURE: 130 MMHG

## 2018-05-24 DIAGNOSIS — Z91.09 ENVIRONMENTAL ALLERGIES: Primary | ICD-10-CM

## 2018-05-24 DIAGNOSIS — J30.9 ALLERGIC RHINITIS, UNSPECIFIED SEASONALITY, UNSPECIFIED TRIGGER: ICD-10-CM

## 2018-05-24 DIAGNOSIS — J45.909 UNCOMPLICATED ASTHMA, UNSPECIFIED ASTHMA SEVERITY, UNSPECIFIED WHETHER PERSISTENT: ICD-10-CM

## 2018-05-24 PROCEDURE — 99215 OFFICE O/P EST HI 40 MIN: CPT | Performed by: INTERNAL MEDICINE

## 2018-05-24 RX ORDER — ALBUTEROL SULFATE 2.5 MG/3ML
2.5 SOLUTION RESPIRATORY (INHALATION) EVERY 6 HOURS PRN
Qty: 75 ML | Refills: 0 | Status: SHIPPED | OUTPATIENT
Start: 2018-05-24 | End: 2022-05-06

## 2018-05-24 NOTE — LETTER
May 25, 2018     Jacob BaiDanny  [address] [facility] [address] [place] [address]    Patient: Dominik Chapa   YOB: 1945   Date of Visit: 5/24/2018       Dear Dr Diann Felder: Thank you for referring Dominik Chapa to me for evaluation  Below are my notes for this consultation  If you have questions, please do not hesitate to call me  I look forward to following your patient along with you  Sincerely,        Sidra Nunez DO        CC: No Recipients  Sidra Nunez DO  5/25/2018 12:19 AM  Sign at close encounter  Progress note - Pulmonary Medicine   Dominik Chapa 71 y o  female MRN: [id number]       Impression & Plan:   71-year-old female with past medical history of DVT, diabetes, CAD, anemia, anxiety, cervical radiculopathy, fibromyalgia, and depression who comes in for follow-up on pulmonary nodules  1   6 mm right middle lobe nodules stable on most recent CT in November  Former smoker  - awaiting official read by radiology    2  Moderate restrictive lung disease secondary to obesity  -I encouraged diet and exercise to help encourage weight loss    3 Allergic rhinitis/seasonal allergies-history of anaphylactic shock with cats in the house      -she was previously doing allergy shots approximately 3 times a week  Due to the inconvenience, she stopped getting them      -check northeast panel, IgE, CBC to assess for atopic asthma and allergic rhinitis     -start trial of Breo 100, 1 puff daily     4  Previously diagnosed SARAY-noncompliant with CPAP, obtain 2nd sleep study which was negative  She has not pursued any additional evaluation or treatment since that time  She is not interested in a sleep study at this point , we will discuss at next visit      _______________________________________________    HPI:    Dominik Chapa presents today for follow-up of pulmonary nodules  She had a repeat CT 3 days ago and results are still pending    She states over the past month she has been feeling worse with symptoms of upper airway shortness, hoarseness, worsening shortness of breath, and chronic cough producing sputum  She describes a rattling in her chest   As far as her dyspnea go she gives an MRC score of 3 and admits to dyspnea climbing stairs, walking on level ground up to 50 feet, and walking up a slight Hill  She admits that she used to be getting allergy shots 3 times a week for several years  She quit approximately 5 years ago  When looking back it seems that she has had a steady decline as a result of the allergy shot this cessation  She also was given inhalers previously but has difficulty being able to coordinate using the button to activate the inhaler and breathing in at the same time  She also notes that she has cats in the house as well  Review of Systems:  Review of Systems   Constitutional: Positive for fatigue and unexpected weight change  HENT: Positive for congestion, postnasal drip, rhinorrhea and sore throat  Negative for ear discharge  Eyes: Negative  Respiratory: Positive for cough, chest tightness, shortness of breath and wheezing  Cardiovascular: Positive for chest pain and palpitations  Gastrointestinal: Negative  Endocrine: Negative  Genitourinary: Negative  Musculoskeletal: Negative for gait problem and myalgias  Joint stiffness   Skin: Negative  Allergic/Immunologic: Negative  Neurological: Positive for weakness, numbness and headaches  Hematological: Negative  Psychiatric/Behavioral: Positive for dysphoric mood  The patient is nervous/anxious  Social history updates:  History   Smoking Status    Former Smoker    Packs/day: 1 00    Years: 6 00    Types: Cigarettes    Quit date: 1984   Smokeless Tobacco    Never Used     Comment: Smoked about a half a pack for about 4 yrs  Quite about 50 yrs ago       Social History     Social History    Marital status: /Civil Union     Spouse name: N/A    Number of children: 2    Years of education: High school or GED     Occupational History    Retired      Social History Main Topics    Smoking status: Former Smoker     Packs/day: 1 00     Years: 6 00     Types: Cigarettes     Quit date: 1984    Smokeless tobacco: Never Used      Comment: Smoked about a half a pack for about 4 yrs  Quite about 50 yrs ago   Alcohol use No    Drug use: No    Sexual activity: No      Comment: No known STD risk factors     Other Topics Concern    Not on file     Social History Narrative    Lives independently with spouse    No known risk factors    Denied:  Exercising regularly       PhysicalExamination:  Vitals:   /56 (BP Location: Right arm, Patient Position: Sitting)   Pulse 60   Temp (!) 97 1 °F (36 2 °C) (Tympanic)   Ht 5' 1 5" (1 562 m)   Wt 95 8 kg (211 lb 3 2 oz)   SpO2 96%   BMI 39 26 kg/m²    Vitals:    05/24/18 1113   BP: 130/56   Pulse: 60   Temp: (!) 97 1 °F (36 2 °C)   SpO2: 96%     General:  Patient is awake, alert, non-toxic and in no acute respiratory distress  Eyes: PERRL, no scleral icterus  Neck: No JVD  CV:  Regular, +S1 and S2, No murmurs, gallops or rubs appreciated  Lungs: Clear to auscultation bilateral without wheeze, rales or rhonci  Abdomen: Soft, +BS, Non-tender, non-distended  Extremities: No clubbing, cyanosis or edema  Neuro: No focal motor/sensory deficits  Skin: Warm, No rashes or ulcerations    Diagnostic Data:  Labs: I personally reviewed the most recent laboratory data pertinent to today's visit  I have personally reviewed pertinent lab results    Lab Results   Component Value Date    WBC 7 85 04/24/2018    HGB 9 9 (L) 04/24/2018    HCT 30 6 (L) 04/24/2018    MCV 82 04/24/2018     04/24/2018     Lab Results   Component Value Date    GLUCOSE 177 (H) 04/24/2018    CALCIUM 9 2 04/24/2018     04/24/2018    K 5 6 (H) 04/24/2018    CO2 22 04/24/2018     04/24/2018    BUN 23 04/24/2018 CREATININE 1 42 (H) 04/24/2018     No results found for: IGE  Lab Results   Component Value Date    ALT 31 04/24/2018    AST 26 04/24/2018    ALKPHOS 129 (H) 04/24/2018    BILITOT 0 30 04/24/2018       PFT results: The most recent pulmonary function tests were reviewed  Ping Rios  5/22/18  Spirometry:  FEV1/FVC Ratio is 78%  FEV1 is 70% predicted  FVC is 67% predicted  Flow volume loop:  Normal  IMPRESSION:  · Spirometry demonstrates mild reduction in vital capacity which may be on the basis of patient effort, body habitus, restriction or air trapping  Lung volumes would be helpful for further evaluation    In office yen  5/24/17 Spirometry: Forced vital capacity: 1 36ZNBd307% Predicted Values  Forced expiratory volume in one second: 1 70HEZr817% Predicted Value  FEV1/FVC ratio is 77%  PFT Interpretation: This study shows a mild reduction in the patient's forced vital capacity  There is no evidence of airflow obstruction    Imaging:  I personally reviewed the images on the [facility] system pertinent to today's visit  CT 05/21/2018-no official results are read but 2 the nodules are still present  Await official read  CT chest November 2017  FINDINGS:  LUNGS:  Stable 6 mm right middle lobe pulmonary nodule (series 3 image 30)  Adjacent 4 mm right middle lobe nodule (series 3 image 27) there is bibasilar dependent atelectasis  There is linear scarring versus atelectasis at the left lung base  In   retrospect is also stable  PLEURA:  Unremarkable  CT chest June 2017   IMPRESSION:  Stable 6 mm right middle lobe pulmonary nodule    Continued follow-up recommended as described on previous exam       Nahed Estrada DO

## 2018-05-25 NOTE — PROGRESS NOTES
Progress note - Pulmonary Medicine   Neyda Mccracken 71 y o  female MRN: [id number]       Impression & Plan:   73-year-old female with past medical history of DVT, diabetes, CAD, anemia, anxiety, cervical radiculopathy, fibromyalgia, and depression who comes in for follow-up on pulmonary nodules  1   6 mm right middle lobe nodules stable on most recent CT in November  Former smoker  - awaiting official read by radiology    2  Moderate restrictive lung disease secondary to obesity  -I encouraged diet and exercise to help encourage weight loss    3 Allergic rhinitis/seasonal allergies-history of anaphylactic shock with cats in the house      -she was previously doing allergy shots approximately 3 times a week  Due to the inconvenience, she stopped getting them      -check [place] panel, IgE, CBC to assess for atopic asthma and allergic rhinitis     -start trial of Breo 100, 1 puff daily     4  Previously diagnosed SARAY-noncompliant with CPAP, obtain 2nd sleep study which was negative  She has not pursued any additional evaluation or treatment since that time  She is not interested in a sleep study at this point , we will discuss at next visit      _______________________________________________    HPI:    Neyda Mccracken presents today for follow-up of pulmonary nodules  She had a repeat CT 3 days ago and results are still pending  She states over the past month she has been feeling worse with symptoms of upper airway shortness, hoarseness, worsening shortness of breath, and chronic cough producing sputum  She describes a rattling in her chest   As far as her dyspnea go she gives an MRC score of 3 and admits to dyspnea climbing stairs, walking on level ground up to 50 feet, and walking up a slight Hill  She admits that she used to be getting allergy shots 3 times a week for several years  She quit approximately 5 years ago    When looking back it seems that she has had a steady decline as a result of the allergy shot this cessation  She also was given inhalers previously but has difficulty being able to coordinate using the button to activate the inhaler and breathing in at the same time  She also notes that she has cats in the house as well  Review of Systems:  Review of Systems   Constitutional: Positive for fatigue and unexpected weight change  HENT: Positive for congestion, postnasal drip, rhinorrhea and sore throat  Negative for ear discharge  Eyes: Negative  Respiratory: Positive for cough, chest tightness, shortness of breath and wheezing  Cardiovascular: Positive for chest pain and palpitations  Gastrointestinal: Negative  Endocrine: Negative  Genitourinary: Negative  Musculoskeletal: Negative for gait problem and myalgias  Joint stiffness   Skin: Negative  Allergic/Immunologic: Negative  Neurological: Positive for weakness, numbness and headaches  Hematological: Negative  Psychiatric/Behavioral: Positive for dysphoric mood  The patient is nervous/anxious  Social history updates:  History   Smoking Status    Former Smoker    Packs/day: 1 00    Years: 6 00    Types: Cigarettes    Quit date: 1984   Smokeless Tobacco    Never Used     Comment: Smoked about a half a pack for about 4 yrs  Quite about 50 yrs ago  Social History     Social History    Marital status: /Civil Union     Spouse name: N/A    Number of children: 2    Years of education: High school or GED     Occupational History    Retired      Social History Main Topics    Smoking status: Former Smoker     Packs/day: 1 00     Years: 6 00     Types: Cigarettes     Quit date: 1984    Smokeless tobacco: Never Used      Comment: Smoked about a half a pack for about 4 yrs  Quite about 50 yrs ago      Alcohol use No    Drug use: No    Sexual activity: No      Comment: No known STD risk factors     Other Topics Concern    Not on file     Social History Narrative Lives independently with spouse    No known risk factors    Denied:  Exercising regularly       PhysicalExamination:  Vitals:   /56 (BP Location: Right arm, Patient Position: Sitting)   Pulse 60   Temp (!) 97 1 °F (36 2 °C) (Tympanic)   Ht 5' 1 5" (1 562 m)   Wt 95 8 kg (211 lb 3 2 oz)   SpO2 96%   BMI 39 26 kg/m²   Vitals:    05/24/18 1113   BP: 130/56   Pulse: 60   Temp: (!) 97 1 °F (36 2 °C)   SpO2: 96%     General:  Patient is awake, alert, non-toxic and in no acute respiratory distress  Eyes: PERRL, no scleral icterus  Neck: No JVD  CV:  Regular, +S1 and S2, No murmurs, gallops or rubs appreciated  Lungs: Clear to auscultation bilateral without wheeze, rales or rhonci  Abdomen: Soft, +BS, Non-tender, non-distended  Extremities: No clubbing, cyanosis or edema  Neuro: No focal motor/sensory deficits  Skin: Warm, No rashes or ulcerations    Diagnostic Data:  Labs: I personally reviewed the most recent laboratory data pertinent to today's visit  I have personally reviewed pertinent lab results  Lab Results   Component Value Date    WBC 7 85 04/24/2018    HGB 9 9 (L) 04/24/2018    HCT 30 6 (L) 04/24/2018    MCV 82 04/24/2018     04/24/2018     Lab Results   Component Value Date    GLUCOSE 177 (H) 04/24/2018    CALCIUM 9 2 04/24/2018     04/24/2018    K 5 6 (H) 04/24/2018    CO2 22 04/24/2018     04/24/2018    BUN 23 04/24/2018    CREATININE 1 42 (H) 04/24/2018     No results found for: IGE  Lab Results   Component Value Date    ALT 31 04/24/2018    AST 26 04/24/2018    ALKPHOS 129 (H) 04/24/2018    BILITOT 0 30 04/24/2018       PFT results: The most recent pulmonary function tests were reviewed  Abdulaziz Jorge  5/22/18  Spirometry:  FEV1/FVC Ratio is 78%  FEV1 is 70% predicted  FVC is 67% predicted  Flow volume loop:  Normal  IMPRESSION:  · Spirometry demonstrates mild reduction in vital capacity which may be on the basis of patient effort, body habitus, restriction or air trapping  Lung volumes would be helpful for further evaluation    In office yen  5/24/17 Spirometry: Forced vital capacity: 1 46ZSZv546% Predicted Values  Forced expiratory volume in one second: 1 56PSLo099% Predicted Value  FEV1/FVC ratio is 77%  PFT Interpretation: This study shows a mild reduction in the patient's forced vital capacity  There is no evidence of airflow obstruction    Imaging:  I personally reviewed the images on the [facility] system pertinent to today's visit  CT 05/21/2018-no official results are read but 2 the nodules are still present  Await official read  CT chest November 2017  FINDINGS:  LUNGS:  Stable 6 mm right middle lobe pulmonary nodule (series 3 image 30)  Adjacent 4 mm right middle lobe nodule (series 3 image 27) there is bibasilar dependent atelectasis  There is linear scarring versus atelectasis at the left lung base  In   retrospect is also stable  PLEURA:  Unremarkable  CT chest June 2017   IMPRESSION:  Stable 6 mm right middle lobe pulmonary nodule    Continued follow-up recommended as described on previous exam       Alphonso Cosby DO

## 2018-05-27 ENCOUNTER — HOSPITAL ENCOUNTER (OUTPATIENT)
Dept: ULTRASOUND IMAGING | Facility: HOSPITAL | Age: 73
Discharge: HOME | End: 2018-05-27
Payer: COMMERCIAL

## 2018-05-27 DIAGNOSIS — K43.9 HERNIA OF ABDOMINAL WALL: ICD-10-CM

## 2018-05-27 PROCEDURE — 76700 US EXAM ABDOM COMPLETE: CPT

## 2018-06-01 DIAGNOSIS — R91.1 PULMONARY NODULE: Primary | ICD-10-CM

## 2018-06-04 ENCOUNTER — DOCUMENTATION (OUTPATIENT)
Dept: PULMONOLOGY | Facility: CLINIC | Age: 73
End: 2018-06-04

## 2018-06-13 ENCOUNTER — DOCUMENTATION (OUTPATIENT)
Dept: PULMONOLOGY | Facility: CLINIC | Age: 73
End: 2018-06-13

## 2018-06-13 ENCOUNTER — TELEPHONE (OUTPATIENT)
Dept: PULMONOLOGY | Facility: CLINIC | Age: 73
End: 2018-06-13

## 2018-06-13 NOTE — TELEPHONE ENCOUNTER
[facility] called stating the rx that was faxe is still missing a signature from the doctor   Please resend once dr Subhash Curtis signs RX

## 2018-06-13 NOTE — TELEPHONE ENCOUNTER
Patient called stating she has received her albuterol but not the Nebulizer from [address] [place] [address]  Patient would like to know if there an insurance problem or other issue?  Please advise

## 2018-06-13 NOTE — PROGRESS NOTES
Orders for neb and supplies was faxed to [address] [place] [address] for the 2nd time on 6/13/18 at 11:50 am

## 2018-08-08 ENCOUNTER — OFFICE VISIT (OUTPATIENT)
Dept: BEHAVIORAL/MENTAL HEALTH CLINIC | Facility: CLINIC | Age: 73
End: 2018-08-08
Payer: COMMERCIAL

## 2018-08-08 DIAGNOSIS — F41.9 ANXIETY AND DEPRESSION: Primary | ICD-10-CM

## 2018-08-08 DIAGNOSIS — F32.A ANXIETY AND DEPRESSION: Primary | ICD-10-CM

## 2018-08-08 PROCEDURE — 90834 PSYTX W PT 45 MINUTES: CPT | Performed by: SOCIAL WORKER

## 2018-08-08 NOTE — PSYCH
Assessment/Plan: Manage anxiety and depression     There are no diagnoses linked to this encounter  Subjective: Struggling with stress and anxiety secondary to multiple stressors  Patient ID: Irene Lorenzo is a 71 y o  female  Experiencing an increase in stress and anxiety secondary tro stressors  Also, reports ongoing issues with poor balance, falling  Review of Systems   Psychiatric/Behavioral: The patient is nervous/anxious  Objective: Presents as anxious and somewhat overwhelmed largely due to mother's continued behavior but also because of her 's pending heart surgery and her own health issues  No acute depressive symptoms  No SI  Very active and engaged with grandchildren  Physical Exam   Psychiatric: Her speech is normal and behavior is normal  Judgment and thought content normal  Her mood appears anxious   Cognition and memory are normal    No SI

## 2018-08-08 NOTE — PATIENT INSTRUCTIONS
Provided supportive therapy  Reviewed coping strategies for stress and anxiety  Encouraged her to maintain activity separate from mother regardless of her mother's input  Will inform PCP of her ongoing balance issues  Offered additional sessions on a PRN basis

## 2018-08-13 LAB — HBA1C MFR BLD HPLC: 8.8 %

## 2018-08-27 DIAGNOSIS — F41.9 ANXIETY: ICD-10-CM

## 2018-08-27 RX ORDER — ALPRAZOLAM 0.5 MG/1
0.5 TABLET ORAL 3 TIMES DAILY PRN
Qty: 90 TABLET | Refills: 0 | Status: SHIPPED | OUTPATIENT
Start: 2018-08-27 | End: 2018-09-07 | Stop reason: SDUPTHER

## 2018-09-03 DIAGNOSIS — N18.9 CHRONIC KIDNEY DISEASE: ICD-10-CM

## 2018-09-06 DIAGNOSIS — N28.9 RENAL INSUFFICIENCY: Primary | ICD-10-CM

## 2018-09-07 ENCOUNTER — OFFICE VISIT (OUTPATIENT)
Dept: INTERNAL MEDICINE CLINIC | Facility: CLINIC | Age: 73
End: 2018-09-07
Payer: COMMERCIAL

## 2018-09-07 VITALS
HEART RATE: 86 BPM | BODY MASS INDEX: 36.91 KG/M2 | OXYGEN SATURATION: 96 % | SYSTOLIC BLOOD PRESSURE: 152 MMHG | WEIGHT: 200.6 LBS | HEIGHT: 62 IN | TEMPERATURE: 98.6 F | DIASTOLIC BLOOD PRESSURE: 76 MMHG

## 2018-09-07 DIAGNOSIS — I10 ESSENTIAL HYPERTENSION: Primary | Chronic | ICD-10-CM

## 2018-09-07 DIAGNOSIS — Z12.31 ENCOUNTER FOR SCREENING MAMMOGRAM FOR BREAST CANCER: ICD-10-CM

## 2018-09-07 DIAGNOSIS — E11.42 DIABETIC POLYNEUROPATHY ASSOCIATED WITH TYPE 2 DIABETES MELLITUS (HCC): ICD-10-CM

## 2018-09-07 DIAGNOSIS — F41.9 ANXIETY: ICD-10-CM

## 2018-09-07 DIAGNOSIS — M48.061 SPINAL STENOSIS OF LUMBAR REGION, UNSPECIFIED WHETHER NEUROGENIC CLAUDICATION PRESENT: ICD-10-CM

## 2018-09-07 DIAGNOSIS — Z23 NEED FOR INFLUENZA VACCINATION: ICD-10-CM

## 2018-09-07 DIAGNOSIS — E03.9 HYPOTHYROIDISM, UNSPECIFIED TYPE: Chronic | ICD-10-CM

## 2018-09-07 PROCEDURE — 90662 IIV NO PRSV INCREASED AG IM: CPT

## 2018-09-07 PROCEDURE — G0008 ADMIN INFLUENZA VIRUS VAC: HCPCS

## 2018-09-07 PROCEDURE — 99214 OFFICE O/P EST MOD 30 MIN: CPT | Performed by: INTERNAL MEDICINE

## 2018-09-07 RX ORDER — ALPRAZOLAM 0.5 MG/1
0.5 TABLET ORAL 3 TIMES DAILY PRN
Qty: 90 TABLET | Refills: 0 | Status: SHIPPED | OUTPATIENT
Start: 2018-09-07 | End: 2018-10-11 | Stop reason: SDUPTHER

## 2018-09-07 NOTE — PROGRESS NOTES
Assessment/Plan:           Problem List Items Addressed This Visit        Endocrine    Hypothyroidism (Chronic)     Continue with current medical regiment         Diabetic polyneuropathy associated with type 2 diabetes mellitus ([address] )     Lab Results   Component Value Date    HGBA1C 9 7 (H) 04/13/2018       No results for input(s): POCGLU in the last 72 hours  Blood Sugar Average: Last 72 hrs:   I have counselled the pt to follow a healthy and balanced diet ,and recommend routine exercise  I will be ordering diabetic laboratories including comprehensive metabolic panel, hemoglobin A1c, urine microalbumin, lipid panel  Relevant Orders    Ambulatory referral to Ophthalmology    Hemoglobin A1C    Lipid Panel with Direct LDL reflex    Microalbumin / creatinine urine ratio       Cardiovascular and Mediastinum    Essential hypertension - Primary (Chronic)     Hypertension - suboptimal control she is very anxious today which likely is increasing her blood pressure  She has multiple problems currently ongoing regarding her mother that is living with her that is very demanding and also  who recently is leaving a nursing home he has recently had a valve replacement and will be returning home  At this point time regarding her blood pressure I would like to continue to monitor  Other    Anxiety (Chronic)     Ongoing symptoms secondary to  recently having a heart valve repair and he is coming home from the rehabilitation center, also mother is very demanding she will not leave the household she puts a lot of pressure on the patient and not reasonable  This leads to symptoms of anxiety the patient does need refill of Xanax which she tolerates and the benefits outweigh the risk  PDMP will be checked           Relevant Medications    ALPRAZolam (XANAX) 0 5 mg tablet    Spinal stenosis of lumbar region (Chronic)     I would like her to follow up with pain management and continue with the treatment modalities         Relevant Orders    Ambulatory referral to Pain Management      Other Visit Diagnoses     Encounter for screening mammogram for breast cancer        Relevant Orders    Mammo screening bilateral w 3d & cad    Need for influenza vaccination        Relevant Orders    influenza vaccine, [id number], high-dose, PF 0 5 mL, for patients 65 yr+ (FLUZONE HIGH-DOSE) (Completed)          RTO 4 months call if any problems  Subjective:      Patient ID: Nam Saavedra is a 71 y o  female  HPI 68-year old female coming in for a follow up office visit regarding essential hypertension, hypothyroidism, diabetic polyneuropathy associated with type 2 diabetes, anxiety, spinal stenosis of lumbar spine; The patient reports me compliant taking medications without untoward side effects the  The patient is here to review his medical condition, update me on the medical condition and the patient reports me no hospitalizations and 1 ER visits  Patient does report me that a recent emergency room visit secondary to asthma since she was secondarily sent to Pulmonary and found to have a moderate restrictive lung disease also allergic rhinitis and also a 6 mm right middle lobe nodule she is a former smoker, oct oct -nov  Next ct , she never got the nebulizer  Not very often wheezing; not following healthy diet,  is in the NH- he  Had mv replaced pt is on the run mother had a fall, caring for the grandchildren , the pt not eating well, what ever she can find she is eating ; no si  Tension filled always watching the clock and walking the dog and the neighbors are helping the pt with the dog  The pt reports si do not last and dosent want davon      The following portions of the patient's history were reviewed and updated as appropriate: allergies, current medications, past family history, past medical history, past social history, past surgical history and problem list     Review of Systems   Constitutional: Negative for activity change, appetite change and unexpected weight change  HENT: Negative for congestion and postnasal drip  Eyes: Negative for visual disturbance  Respiratory: Negative for cough and shortness of breath  Cardiovascular: Negative for chest pain  Gastrointestinal: Negative for abdominal pain, diarrhea, nausea and vomiting  Neurological: Negative for dizziness, light-headedness and headaches  Hematological: Negative for adenopathy  Back pain, anxiety, no suicidal ideation  Objective:                    No Follow-up on file        Allergies   Allergen Reactions    Pollen Extract Other (See Comments)     Cold symptoms         Past Medical History:   Diagnosis Date    Acute embolism and thrombosis of unspecified deep veins of unspecified lower extremity (HCC)     Last Assessed:  5/18/17    Anemia     Anosmia     Anxiety     Arthritis     Asthma     Back pain     Bilateral macular retinal edema     CAD (coronary artery disease)     Cataract     Cervical disc herniation     Cervical radiculopathy     Cervical spinal stenosis     Cervical spondylolysis     Chronic mastoiditis     Complex endometrial hyperplasia     Depression     Diabetes mellitus (HCC)     DVT (deep venous thrombosis) (HCC)     Fibromyalgia     Hyperlipidemia     Hypertension     Hypothyroid     Lumbar radiculopathy     Obese     Spinal stenosis     Stomach ulcer     Thyroid disease      Past Surgical History:   Procedure Laterality Date    BACK SURGERY      CARPAL TUNNEL RELEASE      CATARACT EXTRACTION      CHOLECYSTECTOMY      COLONOSCOPY      CORONARY ANGIOPLASTY      CORONARY ARTERY BYPASS GRAFT      CYSTOSCOPY N/A 6/20/2017    Procedure: Ananth Weaver;  Surgeon: Juan Carlos Chanel MD;  [facility]: [facility];  Service: Gynecology Oncology    [place] LAPAROSCOPY W TOT HYSTERECTUTERUS <=250 GRAM  W TUBE/OVARY N/A 6/20/2017    Procedure: ROBOTIC HYSTERECTOMY; BILATERAL SALPINO-OOPHERECTOMY; umbilical hernia repair ;  Surgeon: Teresa Reddy MD;  Location: [facility];  Service: Gynecology Oncology    TONSILECTOMY AND ADNOIDECTOMY      UMBILICAL HERNIA REPAIR       Current Outpatient Prescriptions on File Prior to Visit   Medication Sig Dispense Refill    albuterol (2 5 mg/3 mL) 0 083 % nebulizer solution Take 3 mL (2 5 mg total) by nebulization every 6 (six) hours as needed for wheezing 75 mL 0    albuterol (PROAIR HFA) 90 mcg/act inhaler Inhale 2 puffs      amLODIPine (NORVASC) 2 5 mg tablet Take 2 5 mg by mouth 2 (two) times a day        aspirin 81 MG tablet Take 81 mg by mouth daily      atorvastatin (LIPITOR) 40 mg tablet Take 40 mg by mouth daily at bedtime        cholecalciferol (VITAMIN D3) 1,000 units tablet Take 2,000 Units by mouth daily      Co-Enzyme Q-10 100 MG CAPS Take 1 capsule by mouth 2 (two) times a day        DULoxetine (CYMBALTA) 60 mg delayed release capsule Take 60 mg by mouth every morning      enalapril (VASOTEC) 20 mg tablet Take 20 mg by mouth daily      insulin aspart (NovoLOG) 100 units/mL injection Inject 12 Units under the skin 3 (three) times a day before meals  0    insulin glargine (LANTUS) 100 units/mL subcutaneous injection Inject 40 Units under the skin 2 (two) times a day 10 mL 0    isosorbide mononitrate (IMDUR) 30 mg 24 hr tablet TAKE 1 TABLET DAILY AT BEDTIME, 30 DAYS, #30, 4 REFILLS, EXTENDED RELEASE   6    levothyroxine 50 mcg tablet Take 50 mcg by mouth daily      metFORMIN (GLUCOPHAGE) 500 mg tablet Take 500 mg by mouth 2 (two) times a day with meals      metoprolol succinate (TOPROL-XL) 25 mg 24 hr tablet Take 25 mg by mouth daily  3    nitroglycerin (NITROLINGUAL) 0 4 mg/spray spray USE ONE SPRAY Q 5 MINUTES AS NEEDED FOR CHEST PAIN   IF NO RELIEF, CALL 911   1    ranolazine (RANEXA) 500 mg 12 hr tablet Take 500 mg by mouth 2 (two) times a day      sucralfate (CARAFATE) 1 g/10 mL suspension Take 10 mL by mouth every 6 (six) hours for 30 days 420 mL 0    tiZANidine (ZANAFLEX) 2 mg tablet Take 2 mg by mouth daily at bedtime       No current facility-administered medications on file prior to visit  Family History   Problem Relation Age of Onset    Arthritis Mother     Leukemia Mother     Other Mother         Anxiety, major depressive disorder, recurrent episode with atypical features    Coronary artery disease Father         Heart problem    Diabetes Father     Other Father         Infectious disease    Alzheimer's disease Maternal Grandmother     Other Maternal Grandfather         Heart problem    Other Daughter         Anxiety, major depressive disorder, recurrent episode with atypical features    Alcohol abuse Other         Grandparent    Cancer Family     Diabetes Family     Hypertension Family     Other Family         Reported prior back trouble, thyroid disorder     Social History     Social History    Marital status: /Civil Union     Spouse name: N/A    Number of children: 2    Years of education: High school or GED     Occupational History    Retired      Social History Main Topics    Smoking status: Former Smoker     Packs/day: 1 00     Years: 6 00     Types: Cigarettes     Quit date: 1984    Smokeless tobacco: Never Used      Comment: Smoked about a half a pack for about 4 yrs  Quite about 50 yrs ago      Alcohol use No    Drug use: No    Sexual activity: No      Comment: No known STD risk factors     Other Topics Concern    Not on file     Social History Narrative    Lives independently with spouse    No known risk factors    Denied:  Exercising regularly     Vitals:    09/07/18 1418   BP: 152/76   Pulse: 86   Temp: 98 6 °F (37 °C)   SpO2: 96%   Weight: 91 kg (200 lb 9 6 oz)   Height: 5' 1 5" (1 562 m)     Results for orders placed or performed during the hospital encounter of 04/24/18   Blood culture #1   Result Value Ref Range    Blood Culture Staphylococcus coagulase negative (A)     Gram Stain Result Gram positive cocci in clusters    Blood culture #2   Result Value Ref Range    Blood Culture No Growth After 5 Days      CBC and differential   Result Value Ref Range    WBC 7 85 4 31 - 10 16 Thousand/uL    RBC 3 72 (L) 3 81 - 5 12 Million/uL    Hemoglobin 9 9 (L) 11 5 - 15 4 g/dL    Hematocrit 30 6 (L) 34 8 - 46 1 %    MCV 82 82 - 98 fL    MCH 26 6 (L) 26 8 - 34 3 pg    MCHC 32 4 31 4 - 37 4 g/dL    RDW 15 4 (H) 11 6 - 15 1 %    MPV 10 3 8 9 - 12 7 fL    Platelets 061 085 - 691 Thousands/uL    Neutrophils Relative 65 43 - 75 %    Lymphocytes Relative 27 14 - 44 %    Monocytes Relative 7 4 - 12 %    Eosinophils Relative 1 0 - 6 %    Basophils Relative 0 0 - 1 %    Neutrophils Absolute 5 13 1 85 - 7 62 Thousands/µL    Lymphocytes Absolute 2 12 0 60 - 4 47 Thousands/µL    Monocytes Absolute 0 51 0 17 - 1 22 Thousand/µL    Eosinophils Absolute 0 08 0 00 - 0 61 Thousand/µL    Basophils Absolute 0 01 0 00 - 0 10 Thousands/µL   Protime-INR   Result Value Ref Range    Protime 13 1 12 1 - 14 4 seconds    INR 0 96 0 86 - 1 16   APTT   Result Value Ref Range    PTT 28 23 - 35 seconds   Comprehensive metabolic panel   Result Value Ref Range    Sodium 136 136 - 145 mmol/L    Potassium 5 6 (H) 3 5 - 5 3 mmol/L    Chloride 103 100 - 108 mmol/L    CO2 22 21 - 32 mmol/L    ANION GAP 11 4 - 13 mmol/L    BUN 23 5 - 25 mg/dL    Creatinine 1 42 (H) 0 60 - 1 30 mg/dL    Glucose 177 (H) 65 - 140 mg/dL    Calcium 9 2 8 3 - 10 1 mg/dL    AST 26 5 - 45 U/L    ALT 31 12 - 78 U/L    Alkaline Phosphatase 129 (H) 46 - 116 U/L    Total Protein 8 1 6 4 - 8 2 g/dL    Albumin 3 2 (L) 3 5 - 5 0 g/dL    Total Bilirubin 0 30 0 20 - 1 00 mg/dL    eGFR 37 ml/min/1 73sq m   Troponin I   Result Value Ref Range    Troponin I <0 02 <=0 04 ng/mL   B-type natriuretic peptide   Result Value Ref Range    NT-proBNP 46 <125 pg/mL   ECG 12 lead   Result Value Ref Range    Ventricular Rate 73 BPM    Atrial Rate 78 BPM    SC Interval 166 ms    QRSD Interval 92 ms    QT Interval 390 ms    QTC Interval 429 ms    P Axis 73 degrees    QRS Axis -7 degrees    T Wave Axis 119 degrees     Weight (last 2 days)     Date/Time   Weight    09/07/18 1418  91 (200 6)            Body mass index is 37 29 kg/m²  BP      Temp      Pulse     Resp      SpO2        Vitals:    09/07/18 1418   Weight: 91 kg (200 lb 9 6 oz)     Vitals:    09/07/18 1418   Weight: 91 kg (200 lb 9 6 oz)       /76   Pulse 86   Temp 98 6 °F (37 °C)   Ht 5' 1 5" (1 562 m)   Wt 91 kg (200 lb 9 6 oz)   SpO2 96%   BMI 37 29 kg/m²          Physical Exam   Constitutional: She appears well-developed and well-nourished  HENT:   Head: Normocephalic  Mouth/Throat: Oropharynx is clear and moist    Eyes: Conjunctivae are normal  Pupils are equal, round, and reactive to light  Right eye exhibits no discharge  Left eye exhibits no discharge  No scleral icterus  Neck: Neck supple  Cardiovascular: Normal rate, regular rhythm, normal heart sounds and intact distal pulses  Exam reveals no gallop and no friction rub  No murmur heard  Pulmonary/Chest: Breath sounds normal  No respiratory distress  She has no wheezes  She has no rales  Abdominal: Soft  Bowel sounds are normal  She exhibits no distension and no mass  There is no tenderness  There is no rebound and no guarding  Musculoskeletal: She exhibits no edema or deformity  Lymphadenopathy:     She has no cervical adenopathy  Neurological: She is alert  Coordination normal    Psychiatric: Her mood appears anxious  She does not exhibit a depressed mood  She expresses no suicidal ideation

## 2018-09-09 NOTE — ASSESSMENT & PLAN NOTE
Lab Results   Component Value Date    HGBA1C 9 7 (H) 04/13/2018       No results for input(s): POCGLU in the last 72 hours  Blood Sugar Average: Last 72 hrs:   I have counselled the pt to follow a healthy and balanced diet ,and recommend routine exercise  I will be ordering diabetic laboratories including comprehensive metabolic panel, hemoglobin A1c, urine microalbumin, lipid panel

## 2018-09-09 NOTE — ASSESSMENT & PLAN NOTE
Ongoing symptoms secondary to  recently having a heart valve repair and he is coming home from the rehabilitation center, also mother is very demanding she will not leave the household she puts a lot of pressure on the patient and not reasonable  This leads to symptoms of anxiety the patient does need refill of Xanax which she tolerates and the benefits outweigh the risk  PDMP will be checked

## 2018-09-09 NOTE — ASSESSMENT & PLAN NOTE
Hypertension - suboptimal control she is very anxious today which likely is increasing her blood pressure  She has multiple problems currently ongoing regarding her mother that is living with her that is very demanding and also  who recently is leaving a nursing home he has recently had a valve replacement and will be returning home  At this point time regarding her blood pressure I would like to continue to monitor

## 2018-09-13 ENCOUNTER — OFFICE VISIT (OUTPATIENT)
Dept: PULMONOLOGY | Facility: CLINIC | Age: 73
End: 2018-09-13
Payer: COMMERCIAL

## 2018-09-13 VITALS
BODY MASS INDEX: 37.36 KG/M2 | TEMPERATURE: 98 F | OXYGEN SATURATION: 98 % | HEIGHT: 62 IN | WEIGHT: 203 LBS | SYSTOLIC BLOOD PRESSURE: 140 MMHG | RESPIRATION RATE: 14 BRPM | DIASTOLIC BLOOD PRESSURE: 60 MMHG | HEART RATE: 79 BPM

## 2018-09-13 DIAGNOSIS — G47.33 OSA (OBSTRUCTIVE SLEEP APNEA): Primary | ICD-10-CM

## 2018-09-13 DIAGNOSIS — R91.1 PULMONARY NODULE: ICD-10-CM

## 2018-09-13 DIAGNOSIS — J30.89 ALLERGIC RHINITIS DUE TO OTHER ALLERGIC TRIGGER, UNSPECIFIED SEASONALITY: ICD-10-CM

## 2018-09-13 DIAGNOSIS — E66.9 CLASS 2 OBESITY WITH BODY MASS INDEX (BMI) OF 39.0 TO 39.9 IN ADULT, UNSPECIFIED OBESITY TYPE, UNSPECIFIED WHETHER SERIOUS COMORBIDITY PRESENT: ICD-10-CM

## 2018-09-13 PROBLEM — R91.8 PULMONARY NODULES: Status: ACTIVE | Noted: 2018-09-13

## 2018-09-13 PROBLEM — J30.9 ALLERGIC RHINITIS: Status: ACTIVE | Noted: 2018-09-13

## 2018-09-13 PROCEDURE — 99214 OFFICE O/P EST MOD 30 MIN: CPT | Performed by: INTERNAL MEDICINE

## 2018-09-13 RX ORDER — DILTIAZEM HYDROCHLORIDE 180 MG/1
300 CAPSULE, EXTENDED RELEASE ORAL
COMMUNITY
Start: 2018-08-29 | End: 2019-01-07 | Stop reason: DRUGHIGH

## 2018-09-13 NOTE — LETTER
September 13, 2018     Marycruz Dukes, Danny  [address] [facility] [id number]    Patient: Greg Hurst   YOB: 1945   Date of Visit: 9/13/2018       Dear Dr Mai End: Thank you for referring Greg Hurst to me for evaluation  Below are my notes for this consultation  If you have questions, please do not hesitate to call me  I look forward to following your patient along with you  Sincerely,        Karl Sorto DO        CC: No Recipients  Karl Sorto DO  9/13/2018  2:01 PM  Sign at close encounter  Progress note - Pulmonary Medicine   Greg Hurst 71 y o  female MRN: [id number]       Impression & Plan:   36-year-old female with past medical history of DVT, diabetes, CAD, anemia, anxiety, cervical radiculopathy, fibromyalgia, and depression who comes in for follow-up on pulmonary nodules  1    Pulmonary nodules in R[facility] - stable on repeat CT chest again and was stable from prior in November  Former smoker        -  Repeat CT in 1 year  Would then stop checking        2  Allergic rhinitis/seasonal allergies-history of anaphylactic shock with cats in the house  Previously doing allergy shots approximately 3 times a week  Due to the inconvenience, she stopped getting them  -  I have rescheduled [place] panel, IgE, CBC to assess for atopic asthma and allergic rhinitis     -  I recommended that she trial Breo 100, 1 puff daily  I explained that it should be unlikely cause cardiac issues and if she has any symptoms to call and we can stop it  3   Moderate restrictive lung disease secondary to obesity      -  I again encouraged her to be more active  She states she will try but is limited due to fatigue and fibromyalgia pain         4  Previously diagnosed SARAY-noncompliant with CPAP  Her 2nd sleep study was negative but she still has symptoms of daytime sleepiness         -  Repeat home sleep study    Would start AutoPAP if positive as she is likely to benefit from that       __________________________________________________________________    HPI:    Merlin Clarke presents today for follow-up of for her pulmonary nodules, allergies and moderate restrictive lung disease  Since our last visit she was unable to perform the blood work to assess for allergies  In addition, she did not trial the breo for concern of heart disease and the fact that it may worsen that  She still notes hoarseness, weakness and shortness of breath when she wakes up in the morning  In fact, today she states that her breathing was worse than what it normally does  She denies significant cough, wheezing or chest tightness  As far as her sleep goes, she still notes daytime sleepiness and snoring  She had a bad experience previously with her CPAP and is hesitant to try it again  However, due to chronic fatigue, she is willing to see if it helps  Review of Systems:  Review of Systems   Constitutional: Positive for fatigue  Negative for chills and unexpected weight change  HENT: Negative  Eyes: Negative  Respiratory: Negative  Cardiovascular: Negative  Gastrointestinal: Negative  Endocrine: Negative  Genitourinary: Negative  Musculoskeletal: Positive for arthralgias, gait problem and myalgias  Neurological: Positive for dizziness  Hematological: Negative  Psychiatric/Behavioral: Negative  Social history updates:  History   Smoking Status    Former Smoker    Packs/day: 1 00    Years: 6 00    Types: Cigarettes    Quit date: 1984   Smokeless Tobacco    Never Used     Comment: Smoked about a half a pack for about 4 yrs  Quite about 50 yrs ago       Social History     Social History    Marital status: /Civil Union     Spouse name: N/A    Number of children: 2    Years of education: High school or GED     Occupational History    Retired      Social History Main Topics    Smoking status: Former Smoker Packs/day: 1 00     Years: 6 00     Types: Cigarettes     Quit date: 46    Smokeless tobacco: Never Used      Comment: Smoked about a half a pack for about 4 yrs  Quite about 50 yrs ago   Alcohol use No    Drug use: No    Sexual activity: No      Comment: No known STD risk factors     Other Topics Concern    Not on file     Social History Narrative    Lives independently with spouse    No known risk factors    Denied:  Exercising regularly       PhysicalExamination:  Vitals:   /60   Pulse 79   Temp 98 °F (36 7 °C)   Resp 14   Ht 5' 1 5" (1 562 m)   Wt 92 1 kg (203 lb)   SpO2 98%   BMI 37 74 kg/m²    General: Pleasant, obese, Awake alert and oriented x 3, conversant without conversational dyspnea, NAD, normal affect  HEENT:  PERRL, Sclera noninjected, nonicteric OU, Nares patent,  no craniofacial abnormalities, Mucous membranes, moist, no oral lesions, normal dentition, Mallampati class 4  NECK: Trachea midline, no accessory muscle use, no stridor, no cervical or supraclavicular adenopathy, JVP not elevated  CARDIAC: Reg, single s1/S2, no m/r/g  PULM: CTA bilaterally no wheezing, rhonchi or rales  ABD: Normoactive bowel sounds, soft nontender, nondistended, no rebound, no rigidity, no guarding  EXT: No cyanosis, no clubbing, no edema, normal capillary refill  NEURO: no focal neurologic deficits, AAOx3, moving all extremities appropriately      Diagnostic Data:  Labs: I personally reviewed the most recent laboratory data pertinent to today's visit  I have personally reviewed pertinent lab results    Lab Results   Component Value Date    WBC 7 85 04/24/2018    HGB 9 9 (L) 04/24/2018    HCT 30 6 (L) 04/24/2018    MCV 82 04/24/2018     04/24/2018     Lab Results   Component Value Date    GLUCOSE 248 (H) 11/09/2015    CALCIUM 9 2 04/24/2018     04/24/2018    K 5 6 (H) 04/24/2018    CO2 22 04/24/2018     04/24/2018    BUN 23 04/24/2018    CREATININE 1 42 (H) 04/24/2018     No results found for: IGE  Lab Results   Component Value Date    ALT 31 04/24/2018    AST 26 04/24/2018    ALKPHOS 129 (H) 04/24/2018    BILITOT 0 5 11/16/2017       PFT results: The most recent pulmonary function tests were reviewed  Elsi Kemp  5/22/18  Spirometry:  FEV1/FVC Ratio is 78%   FEV1 is 70% predicted   FVC is 67% predicted  Flow volume loop:  Normal  IMPRESSION:  · Spirometry demonstrates mild reduction in vital capacity which may be on the basis of patient effort, body habitus, restriction or air trapping   Lung volumes would be helpful for further evaluation     In office yen  5/24/17 Spirometry: Forced vital capacity: 1 [id number]% Predicted Values  Forced expiratory volume in one second: 1 [id number]% Predicted Value  FEV1/FVC ratio is 77%  PFT Interpretation: This study shows a mild reduction in the patient's forced vital capacity  There is no evidence of airflow obstruction     Imaging:  I personally reviewed the images on the [facility] system pertinent to today's visit  CT 05/21/2018-FINDINGS:  LUNGS:  Stable 2 to 3 mm right middle lobe pulmonary nodule is noted series 2 image 28  Stable 6 mm right middle lobe pulmonary nodule is noted series 2 image 30  No new nodules or masses are seen  Minimal linear scarring is again identified within   the left lower lobe      PLEURA:  Unremarkable      HEART/GREAT VESSELS:  Patient is status post coronary artery bypass grafting      MEDIASTINUM AND SAMANTHA:  Unremarkable        CT chest November 2017  FINDINGS:  LUNGS:  Stable 6 mm right middle lobe pulmonary nodule (series 3 image 30)   Adjacent 4 mm right middle lobe nodule (series 3 image 27) there is bibasilar dependent atelectasis   There is linear scarring versus atelectasis at the left lung base   In   retrospect is also stable    PLEURA:  Unremarkable      CT chest June 2017   IMPRESSION:  Stable 6 mm right middle lobe pulmonary nodule   Continued follow-up recommended as described on Verena Jesus, DO

## 2018-09-13 NOTE — PROGRESS NOTES
Progress note - Pulmonary Medicine   Lexy Calvo 71 y o  female MRN: [id number]       Impression & Plan:   60-year-old female with past medical history of DVT, diabetes, CAD, anemia, anxiety, cervical radiculopathy, fibromyalgia, and depression who comes in for follow-up on pulmonary nodules  1   Pulmonary nodules in RML - stable on repeat CT chest again and was stable from prior in November  Former smoker        - Repeat CT in 1 year  Would then stop checking        2  Allergic rhinitis/seasonal allergies-history of anaphylactic shock with cats in the house  Previously doing allergy shots approximately 3 times a week  Due to the inconvenience, she stopped getting them  - I have rescheduled [place] panel, IgE, CBC to assess for atopic asthma and allergic rhinitis     - I recommended that she trial Breo 100, 1 puff daily  I explained that it should be unlikely cause cardiac issues and if she has any symptoms to call and we can stop it  3   Moderate restrictive lung disease secondary to obesity      -  I again encouraged her to be more active  She states she will try but is limited due to fatigue and fibromyalgia pain         4  Previously diagnosed SARAY-noncompliant with CPAP  Her 2nd sleep study was negative but she still has symptoms of daytime sleepiness         -  Repeat home sleep study  Would start AutoPAP if positive as she is likely to benefit from that       __________________________________________________________________    HPI:    Lexy Calvo presents today for follow-up of for her pulmonary nodules, allergies and moderate restrictive lung disease  Since our last visit she was unable to perform the blood work to assess for allergies  In addition, she did not trial the breo for concern of heart disease and the fact that it may worsen that  She still notes hoarseness, weakness and shortness of breath when she wakes up in the morning    In fact, today she states that her breathing was worse than what it normally does  She denies significant cough, wheezing or chest tightness  As far as her sleep goes, she still notes daytime sleepiness and snoring  She had a bad experience previously with her CPAP and is hesitant to try it again  However, due to chronic fatigue, she is willing to see if it helps  Review of Systems:  Review of Systems   Constitutional: Positive for fatigue  Negative for chills and unexpected weight change  HENT: Negative  Eyes: Negative  Respiratory: Negative  Cardiovascular: Negative  Gastrointestinal: Negative  Endocrine: Negative  Genitourinary: Negative  Musculoskeletal: Positive for arthralgias, gait problem and myalgias  Neurological: Positive for dizziness  Hematological: Negative  Psychiatric/Behavioral: Negative  Social history updates:  History   Smoking Status    Former Smoker    Packs/day: 1 00    Years: 6 00    Types: Cigarettes    Quit date: 1984   Smokeless Tobacco    Never Used     Comment: Smoked about a half a pack for about 4 yrs  Quite about 50 yrs ago  Social History     Social History    Marital status: /Civil Union     Spouse name: N/A    Number of children: 2    Years of education: High school or GED     Occupational History    Retired      Social History Main Topics    Smoking status: Former Smoker     Packs/day: 1 00     Years: 6 00     Types: Cigarettes     Quit date: 1984    Smokeless tobacco: Never Used      Comment: Smoked about a half a pack for about 4 yrs  Quite about 50 yrs ago      Alcohol use No    Drug use: No    Sexual activity: No      Comment: No known STD risk factors     Other Topics Concern    Not on file     Social History Narrative    Lives independently with spouse    No known risk factors    Denied:  Exercising regularly       PhysicalExamination:  Vitals:   /60   Pulse 79   Temp 98 °F (36 7 °C)   Resp 14   Ht 5' 1 5" (1 562 m)   Wt 92 1 kg (203 lb)   SpO2 98%   BMI 37 74 kg/m²   General: Pleasant, obese, Awake alert and oriented x 3, conversant without conversational dyspnea, NAD, normal affect  HEENT:  PERRL, Sclera noninjected, nonicteric OU, Nares patent,  no craniofacial abnormalities, Mucous membranes, moist, no oral lesions, normal dentition, Mallampati class 4  NECK: Trachea midline, no accessory muscle use, no stridor, no cervical or supraclavicular adenopathy, JVP not elevated  CARDIAC: Reg, single s1/S2, no m/r/g  PULM: CTA bilaterally no wheezing, rhonchi or rales  ABD: Normoactive bowel sounds, soft nontender, nondistended, no rebound, no rigidity, no guarding  EXT: No cyanosis, no clubbing, no edema, normal capillary refill  NEURO: no focal neurologic deficits, AAOx3, moving all extremities appropriately      Diagnostic Data:  Labs: I personally reviewed the most recent laboratory data pertinent to today's visit  I have personally reviewed pertinent lab results  Lab Results   Component Value Date    WBC 7 85 04/24/2018    HGB 9 9 (L) 04/24/2018    HCT 30 6 (L) 04/24/2018    MCV 82 04/24/2018     04/24/2018     Lab Results   Component Value Date    GLUCOSE 248 (H) 11/09/2015    CALCIUM 9 2 04/24/2018     04/24/2018    K 5 6 (H) 04/24/2018    CO2 22 04/24/2018     04/24/2018    BUN 23 04/24/2018    CREATININE 1 42 (H) 04/24/2018     No results found for: IGE  Lab Results   Component Value Date    ALT 31 04/24/2018    AST 26 04/24/2018    ALKPHOS 129 (H) 04/24/2018    BILITOT 0 5 11/16/2017       PFT results: The most recent pulmonary function tests were reviewed  Heather Taveras  5/22/18  Spirometry:  FEV1/FVC Ratio is 78%   FEV1 is 70% predicted   FVC is 67% predicted     Flow volume loop:  Normal  IMPRESSION:  · Spirometry demonstrates mild reduction in vital capacity which may be on the basis of patient effort, body habitus, restriction or air trapping   Lung volumes would be helpful for further evaluation     In office yen  5/24/17 Spirometry: Forced vital capacity: 1 20ACQn439% Predicted Values  Forced expiratory volume in one second: 1 00WYNm049% Predicted Value  FEV1/FVC ratio is 77%  PFT Interpretation: This study shows a mild reduction in the patient's forced vital capacity  There is no evidence of airflow obstruction     Imaging:  I personally reviewed the images on the [facility] system pertinent to today's visit  CT 05/21/2018-FINDINGS:  LUNGS:  Stable 2 to 3 mm right middle lobe pulmonary nodule is noted series 2 image 28  Stable 6 mm right middle lobe pulmonary nodule is noted series 2 image 30  No new nodules or masses are seen  Minimal linear scarring is again identified within   the left lower lobe      PLEURA:  Unremarkable      HEART/GREAT VESSELS:  Patient is status post coronary artery bypass grafting      MEDIASTINUM AND SAMANTHA:  Unremarkable        CT chest November 2017  FINDINGS:  LUNGS:  Stable 6 mm right middle lobe pulmonary nodule (series 3 image 30)   Adjacent 4 mm right middle lobe nodule (series 3 image 27) there is bibasilar dependent atelectasis   There is linear scarring versus atelectasis at the left lung base   In   retrospect is also stable    PLEURA:  Unremarkable      CT chest June 2017   IMPRESSION:  Stable 6 mm right middle lobe pulmonary nodule   Continued follow-up recommended as described on     Michelle Ling DO

## 2018-09-14 ENCOUNTER — OFFICE VISIT (OUTPATIENT)
Dept: PAIN MEDICINE | Facility: CLINIC | Age: 73
End: 2018-09-14
Payer: COMMERCIAL

## 2018-09-14 VITALS
DIASTOLIC BLOOD PRESSURE: 82 MMHG | BODY MASS INDEX: 38.14 KG/M2 | RESPIRATION RATE: 14 BRPM | WEIGHT: 202 LBS | HEART RATE: 76 BPM | SYSTOLIC BLOOD PRESSURE: 168 MMHG | HEIGHT: 61 IN

## 2018-09-14 DIAGNOSIS — M47.816 LUMBAR SPONDYLOSIS: ICD-10-CM

## 2018-09-14 DIAGNOSIS — M48.061 SPINAL STENOSIS OF LUMBAR REGION, UNSPECIFIED WHETHER NEUROGENIC CLAUDICATION PRESENT: ICD-10-CM

## 2018-09-14 DIAGNOSIS — M51.26 LUMBAR DISC HERNIATION: ICD-10-CM

## 2018-09-14 DIAGNOSIS — M47.816 LUMBAR FACET ARTHROPATHY: ICD-10-CM

## 2018-09-14 DIAGNOSIS — G89.4 CHRONIC PAIN SYNDROME: Primary | ICD-10-CM

## 2018-09-14 PROCEDURE — 99214 OFFICE O/P EST MOD 30 MIN: CPT | Performed by: NURSE PRACTITIONER

## 2018-09-14 NOTE — PROGRESS NOTES
Assessment:  1  Chronic pain syndrome    2  Lumbar spondylosis    3  Lumbar disc herniation    4  Spinal stenosis of lumbar region, unspecified whether neurogenic claudication present    5  Lumbar facet arthropathy (HCC)        Plan:  The patient is a 71 y o  female  with history of cervical radiculopathy,and lumbar spondylosis  She presents with pain that is multifactorial in nature, and most consistent with ongoing lumbar stenosis as well as bilateral facet arthropathy  The patient was noted to have positive facet tenderness, and positive facet loading on examination  The patient reports that she would not like to move forward with any additional epidural steroid injections at this time  However, she reports that she would like to proceed with a bilateral L3-L5 medial branch block to help diagnose her low back pain  I educated the patient that and Medial branch block is primarily a diagnostic procedure, and she may receive only temporarily pain relief for a couple of hours to 1 day after the procedure  The patient was also instructed that if she had positive results after her bilateral L3-L5 medial branch blocks, we would be able to proceed with bilateral L3-L5 radiofrequency ablations  The patient was educated on the procedures most common risks, was given a brochure the procedure  The patient verbalized understanding, would like to proceed with the procedure  Therefore the patient be scheduled for an upcoming Tuesday or Thursday  Complete risks and benefits including bleeding, infection, tissue reaction, nerve injury and allergic reaction were discussed  The approach was demonstrated using models and literature was provided  The patient will follow up after the completion of bilateral L3-L5 medial branch blocks, or sooner with the worsening of symptoms  History of Present Illness: The patient is a 71 y o  female  with history of cervical radiculopathy,and lumbar spondylosis   She was last seen in the office on 02/05/2018, where she was ordered a L4 TFESI  However, the patient reported that she did not go forward with the injection, due to she underwent epidural steroid injections in the past, which only provided short-term pain relief  She presents for a follow up office visit in regards to chronic pain secondary to low back pain  The patient currently reports low back pain she describes her pain as shooting intermittent pain that is localized to her low back  She denies radiation of symptoms into her bilateral legs  She denies  bowel or bladder issues  She does report mild bilateral leg weakness, which is unchanged  She reports that her pain is symptoms are unchanged since last visit  She currently rates her pain as 7/10 numeric pain scale  I have personally reviewed and/or updated the patient's past medical history, past surgical history, family history, social history, current medications, allergies, and vital signs today  Review of Systems:    Review of Systems   Respiratory: Positive for shortness of breath  Cardiovascular: Positive for chest pain  Gastrointestinal: Negative for constipation, diarrhea, nausea and vomiting  Musculoskeletal: Positive for back pain, gait problem, joint swelling and myalgias  Negative for arthralgias  Skin: Negative for rash  Neurological: Positive for weakness  Negative for dizziness and seizures  All other systems reviewed and are negative          Past Medical History:   Diagnosis Date    Acute embolism and thrombosis of unspecified deep veins of unspecified lower extremity (HCC)     Last Assessed:  5/18/17    Anemia     Anosmia     Anxiety     Arthritis     Asthma     Back pain     Bilateral macular retinal edema     CAD (coronary artery disease)     Cataract     Cervical disc herniation     Cervical radiculopathy     Cervical spinal stenosis     Cervical spondylolysis     Chronic mastoiditis     Complex endometrial hyperplasia     Depression     [facility] [address] [facility]     [facility]     Fibromyalgia     Hyperlipidemia     Hypertension     Hypothyroid     Lumbar radiculopathy     Obese     Spinal stenosis     Stomach ulcer     Thyroid disease        Past Surgical History:   Procedure Laterality Date    BACK SURGERY      CARPAL TUNNEL RELEASE      CATARACT EXTRACTION      CHOLECYSTECTOMY      COLONOSCOPY      CORONARY ANGIOPLASTY      CORONARY ARTERY BYPASS GRAFT      CYSTOSCOPY N/A 6/20/2017    Procedure: Thurnell Livers;  Surgeon: Iban Whiting MD;  [facility];  Service: Gynecology Oncology    [place] LAPAROSCOPY W TOT HYSTERECTUTERUS <=250 GRAM  W TUBE/OVARY N/A 6/20/2017    Procedure: ROBOTIC HYSTERECTOMY; BILATERAL SALPINO-OOPHERECTOMY; umbilical hernia repair ;  Surgeon: Iban Whiting MD;  [facility];  Service: Gynecology Oncology    TONSILECTOMY AND ADNOIDECTOMY      UMBILICAL HERNIA REPAIR         Family History   Problem Relation Age of Onset    Arthritis Mother     Leukemia Mother     Other Mother         Anxiety, major depressive disorder, recurrent episode with atypical features    Coronary artery disease Father         Heart problem    Diabetes Father     Other Father         Infectious disease    Alzheimer's disease Maternal Grandmother     Other Maternal Grandfather         Heart problem    Other Daughter         Anxiety, major depressive disorder, recurrent episode with atypical features    Alcohol abuse Other         Grandparent    Cancer Family     Diabetes Family     Hypertension Family     Other Family         Reported prior back trouble, thyroid disorder       Social History     Occupational History    Retired      Social History Main Topics    Smoking status: Former Smoker     Packs/day: 1 00     Years: 6 00     Types: Cigarettes     Quit date: 1984    Smokeless tobacco: Never Used      Comment: Smoked about a half a pack for about 4 yrs  Quite about 50 yrs ago   Alcohol use No    Drug use: No    Sexual activity: No      Comment: No known STD risk factors         Current Outpatient Prescriptions:     albuterol (PROAIR HFA) 90 mcg/act inhaler, Inhale 2 puffs, Disp: , Rfl:     ALPRAZolam (XANAX) 0 5 mg tablet, Take 1 tablet (0 5 mg total) by mouth 3 (three) times a day as needed for anxiety, Disp: 90 tablet, Rfl: 0    aspirin 81 MG tablet, Take 81 mg by mouth daily, Disp: , Rfl:     atorvastatin (LIPITOR) 40 mg tablet, Take 40 mg by mouth daily at bedtime  , Disp: , Rfl:     cholecalciferol (VITAMIN D3) 1,000 units tablet, Take 2,000 Units by mouth daily, Disp: , Rfl:     Co-Enzyme Q-10 100 MG CAPS, Take 1 capsule by mouth 2 (two) times a day  , Disp: , Rfl:     diltiazem (TIAZAC) 180 MG 24 hr capsule, , Disp: , Rfl:     DULoxetine (CYMBALTA) 60 mg delayed release capsule, Take 60 mg by mouth every morning, Disp: , Rfl:     enalapril (VASOTEC) 20 mg tablet, Take 20 mg by mouth daily, Disp: , Rfl:     insulin aspart (NovoLOG) 100 units/mL injection, Inject 12 Units under the skin 3 (three) times a day before meals, Disp: , Rfl: 0    insulin glargine (LANTUS) 100 units/mL subcutaneous injection, Inject 40 Units under the skin 2 (two) times a day, Disp: 10 mL, Rfl: 0    isosorbide mononitrate (IMDUR) 30 mg 24 hr tablet, TAKE 1 TABLET DAILY AT BEDTIME, 30 DAYS, #30, 4 REFILLS, EXTENDED RELEASE , Disp: , Rfl: 6    levothyroxine 50 mcg tablet, Take 50 mcg by mouth daily, Disp: , Rfl:     metFORMIN (GLUCOPHAGE) 500 mg tablet, Take 500 mg by mouth 2 (two) times a day with meals, Disp: , Rfl:     nitroglycerin (NITROLINGUAL) 0 4 mg/spray spray, USE ONE SPRAY Q 5 MINUTES AS NEEDED FOR CHEST PAIN   IF NO RELIEF, CALL 911 , Disp: , Rfl: 1    tiZANidine (ZANAFLEX) 2 mg tablet, Take 2 mg by mouth daily at bedtime, Disp: , Rfl:     albuterol (2 5 mg/3 mL) 0 083 % nebulizer solution, Take 3 mL (2 5 mg total) by nebulization every 6 (six) hours as needed for wheezing (Patient not taking: Reported on 9/14/2018 ), Disp: 75 mL, Rfl: 0    Allergies   Allergen Reactions    Molds & Smuts     Other      RAGWEED, CAT DANDER, DOG DANDER     Pollen Extract Other (See Comments)     Cold symptoms         Physical Exam:    /82   Pulse 76   Resp 14   Ht 5' 1" (1 549 m)   Wt 91 6 kg (202 lb)   BMI 38 17 kg/m²     Constitutional:normal, well developed, well nourished, alert, in no distress and non-toxic and no overt pain behavior  and obese  Eyes:anicteric  HEENT:grossly intact  Neck:supple, symmetric, trachea midline and no masses   Pulmonary:even and unlabored  Cardiovascular:No edema or pitting edema present  Skin:Normal without rashes or lesions and well hydrated  Psychiatric:Mood and affect appropriate  Neurologic:Cranial Nerves II-XII grossly intact  Musculoskeletal:normal     Lumbar Spine Exam    Appearance:  Normal lordosis  Palpation/Tenderness:  left lumbar paraspinal tenderness  right lumbar paraspinal tenderness  Sensory:  no sensory deficits noted  Range of Motion:  Flexion:  No limitation  with pain  Extension:  No limitation  with pain  Lateral Flexion - Left:  No limitation  with pain  Lateral Flexion - Right:  No limitation  with pain  Rotation - Left:  No limitation  with pain  Rotation - Right:  No limitation  with pain  Motor Strength:  Left hip flexion:  5/5  Right hip flexion:  5/5  Left knee extension:  5/5  Right knee extension:  5/5  Left foot dorsiflexion:  5/5  Left foot plantar flexion:  5/5  Right foot dorsiflexion:  5/5  Right foot plantar flexion:  5/5     Imaging  FL spine and pain procedure    (Results Pending)         No orders of the defined types were placed in this encounter

## 2018-09-27 ENCOUNTER — HOSPITAL ENCOUNTER (OUTPATIENT)
Dept: RADIOLOGY | Facility: CLINIC | Age: 73
Discharge: HOME | End: 2018-09-27
Attending: ANESTHESIOLOGY | Admitting: ANESTHESIOLOGY
Payer: COMMERCIAL

## 2018-09-27 VITALS
RESPIRATION RATE: 18 BRPM | TEMPERATURE: 98.3 F | DIASTOLIC BLOOD PRESSURE: 71 MMHG | OXYGEN SATURATION: 98 % | HEART RATE: 74 BPM | SYSTOLIC BLOOD PRESSURE: 163 MMHG

## 2018-09-27 DIAGNOSIS — M47.816 LUMBAR FACET ARTHROPATHY: ICD-10-CM

## 2018-09-27 DIAGNOSIS — M47.816 LUMBAR SPONDYLOSIS: ICD-10-CM

## 2018-09-27 PROCEDURE — 64494 INJ PARAVERT F JNT L/S 2 LEV: CPT | Performed by: ANESTHESIOLOGY

## 2018-09-27 PROCEDURE — 64493 INJ PARAVERT F JNT L/S 1 LEV: CPT | Performed by: ANESTHESIOLOGY

## 2018-09-27 RX ORDER — BUPIVACAINE HCL/PF 2.5 MG/ML
10 VIAL (ML) INJECTION ONCE
Status: COMPLETED | OUTPATIENT
Start: 2018-09-27 | End: 2018-09-27

## 2018-09-27 RX ADMIN — Medication 3 ML: at 08:16

## 2018-09-27 NOTE — DISCHARGE INSTR - LAB

## 2018-09-27 NOTE — H&P
History of Present Illness: The patient is a 71 y o  female who presents with complaints of lower back pain secondary to lumbar spondylosis and is here today for bilateral L3-5 medial branch blocks      Patient Active Problem List   Diagnosis    Anxiety    Type II or unspecified type diabetes mellitus with other specified manifestations, uncontrolled    Depression    Chronic renal insufficiency    Chest pain on breathing    History of DVT (deep vein thrombosis)    PVC's (premature ventricular contractions)    CAD (coronary artery disease)    Essential hypertension    Hyperlipidemia    Hypothyroidism    Fibromyalgia    Spinal stenosis of lumbar region    Adenomatous polyp of colon    Vitreo-retinal adhesions    Vitamin D deficiency    Vitamin B12 deficiency    Ventral hernia    Vascular claudication (HCC)    Thickened endometrium    Spondylosis of lumbar region without myelopathy or radiculopathy    Spondylosis of cervical region without myelopathy or radiculopathy    Palpitations    Onychomycosis    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    Panic attack    Obesity    Myofascial pain syndrome    Retinal edema    Diabetic polyneuropathy associated with type 2 diabetes mellitus (HCC)    SARAY (obstructive sleep apnea)    Allergic rhinitis    Pulmonary nodules       Past Medical History:   Diagnosis Date    Acute embolism and thrombosis of unspecified deep veins of unspecified lower extremity (HCC)     Last Assessed:  5/18/17    Anemia     Anosmia     Anxiety     Arthritis     Asthma     Back pain     Bilateral macular retinal edema     CAD (coronary artery disease)     Cataract     Cervical disc herniation     Cervical radiculopathy     Cervical spinal stenosis     Cervical spondylolysis     Chronic mastoiditis     Complex endometrial hyperplasia     Depression     Diabetes mellitus (HCC)     [facility]     Fibromyalgia     Hyperlipidemia     Hypertension     Hypothyroid     Lumbar radiculopathy     Obese     Spinal stenosis     Stomach ulcer     Thyroid disease        Past Surgical History:   Procedure Laterality Date    BACK SURGERY      CARPAL TUNNEL RELEASE      CATARACT EXTRACTION      CHOLECYSTECTOMY      COLONOSCOPY      CORONARY ANGIOPLASTY      CORONARY ARTERY BYPASS GRAFT      CYSTOSCOPY N/A 6/20/2017    Procedure: Mohan Brilliant;  Surgeon: Anibal Hull MD;  [facility];  Service: Gynecology Oncology    ID LAPAROSCOPY W TOT HYSTERECTUTERUS <=250 [address] [place]  W TUBE/OVARY N/A 6/20/2017    Procedure: ROBOTIC HYSTERECTOMY; BILATERAL SALPINO-OOPHERECTOMY; umbilical hernia repair ;  Surgeon: Anibal Hull MD;  [facility];  Service: Gynecology Oncology    TONSILECTOMY AND ADNOIDECTOMY      UMBILICAL HERNIA REPAIR           Current Outpatient Prescriptions:     albuterol (2 5 mg/3 mL) 0 083 % nebulizer solution, Take 3 mL (2 5 mg total) by nebulization every 6 (six) hours as needed for wheezing (Patient not taking: Reported on 9/14/2018 ), Disp: 75 mL, Rfl: 0    albuterol (PROAIR HFA) 90 mcg/act inhaler, Inhale 2 puffs, Disp: , Rfl:     ALPRAZolam (XANAX) 0 5 mg tablet, Take 1 tablet (0 5 mg total) by mouth 3 (three) times a day as needed for anxiety, Disp: 90 tablet, Rfl: 0    aspirin 81 MG tablet, Take 81 mg by mouth daily, Disp: , Rfl:     atorvastatin (LIPITOR) 40 mg tablet, Take 40 mg by mouth daily at bedtime  , Disp: , Rfl:     cholecalciferol (VITAMIN D3) 1,000 units tablet, Take 2,000 Units by mouth daily, Disp: , Rfl:     Co-Enzyme Q-10 100 MG CAPS, Take 1 capsule by mouth 2 (two) times a day  , Disp: , Rfl:     diltiazem (TIAZAC) 180 MG 24 hr capsule, , Disp: , Rfl:     DULoxetine (CYMBALTA) 60 mg delayed release capsule, Take 60 mg by mouth every morning, Disp: , Rfl:     enalapril (VASOTEC) 20 mg tablet, Take 20 mg by mouth daily, Disp: , Rfl:   insulin aspart (NovoLOG) 100 units/mL injection, Inject 12 Units under the skin 3 (three) times a day before meals, Disp: , Rfl: 0    insulin glargine (LANTUS) 100 units/mL subcutaneous injection, Inject 40 Units under the skin 2 (two) times a day, Disp: 10 mL, Rfl: 0    isosorbide mononitrate (IMDUR) 30 mg 24 hr tablet, TAKE 1 TABLET DAILY AT BEDTIME, 30 DAYS, #30, 4 REFILLS, EXTENDED RELEASE , Disp: , Rfl: 6    levothyroxine 50 mcg tablet, Take 50 mcg by mouth daily, Disp: , Rfl:     metFORMIN (GLUCOPHAGE) 500 mg tablet, Take 500 mg by mouth 2 (two) times a day with meals, Disp: , Rfl:     nitroglycerin (NITROLINGUAL) 0 4 mg/spray spray, USE ONE SPRAY Q 5 MINUTES AS NEEDED FOR CHEST PAIN  IF NO RELIEF, CALL 911 , Disp: , Rfl: 1    tiZANidine (ZANAFLEX) 2 mg tablet, Take 2 mg by mouth daily at bedtime, Disp: , Rfl:     Current Facility-Administered Medications:     bupivacaine (PF) (MARCAINE) 0 25 % injection 10 mL, 10 mL, Perineural, Once, Taiwo Andrews MD    Allergies   Allergen Reactions    Molds & Smuts     Other      RAGWEED, CAT DANDER, DOG DANDER     Pollen Extract Other (See Comments)     Cold symptoms         Physical Exam:   Vitals:    09/27/18 0754   BP: 145/70   Pulse: 76   Resp: 18   Temp: 98 3 °F (36 8 °C)   SpO2: 98%     General: Awake, Alert, Oriented x 3, Mood and affect appropriate  Respiratory: Respirations even and unlabored  Cardiovascular: Peripheral pulses intact; no edema  Musculoskeletal Exam:   Lower back tenderness    ASA Score: 2    Patient/Chart Verification  Patient ID Verified: Verbal  Consents Confirmed: Procedural, To be obtained in the Pre-Procedure area  H&P( within 30 days) Verified: To be obtained in the Pre-Procedure area  Allergies Reviewed: Yes  Anticoag/NSAID held?: NA  Currently on antibiotics?: No    Assessment:   1  Lumbar spondylosis    2   Lumbar facet arthropathy (HCC)        Plan: Bilateral L3-L5 MBB

## 2018-09-27 NOTE — PROGRESS NOTES
Pt said because of her kidneys she should have limited use of contrast  Dr Rosales Hildale informed

## 2018-10-11 DIAGNOSIS — F41.9 ANXIETY: ICD-10-CM

## 2018-10-11 RX ORDER — ALPRAZOLAM 0.5 MG/1
0.5 TABLET ORAL 3 TIMES DAILY PRN
Qty: 90 TABLET | Refills: 0 | Status: SHIPPED | OUTPATIENT
Start: 2018-10-11 | End: 2018-11-21 | Stop reason: SDUPTHER

## 2018-10-11 NOTE — TELEPHONE ENCOUNTER
PDMP checked [place] 9/7/18 08/27/2018  1  08/27/2018  ALPRAZOLAM 0 5 MG TABLET  90 0  30  [id number]  0  [place]ic[place]  [place]    07/08/2018  1  05/22/2018  ALPRAZOLAM 0 5 MG TABLET  90 0  30  [id number]

## 2018-10-26 ENCOUNTER — HOSPITAL ENCOUNTER (OUTPATIENT)
Dept: RADIOLOGY | Facility: CLINIC | Age: 73
Discharge: HOME | End: 2018-10-26
Attending: ANESTHESIOLOGY
Payer: COMMERCIAL

## 2018-10-26 VITALS
HEART RATE: 79 BPM | SYSTOLIC BLOOD PRESSURE: 168 MMHG | DIASTOLIC BLOOD PRESSURE: 75 MMHG | TEMPERATURE: 98.3 F | OXYGEN SATURATION: 98 % | RESPIRATION RATE: 18 BRPM

## 2018-10-26 DIAGNOSIS — M47.816 OSTEOARTHRITIS OF SPINE WITHOUT MYELOPATHY OR RADICULOPATHY, LUMBAR REGION: ICD-10-CM

## 2018-10-26 PROCEDURE — 64494 INJ PARAVERT F JNT L/S 2 LEV: CPT | Performed by: ANESTHESIOLOGY

## 2018-10-26 PROCEDURE — 64493 INJ PARAVERT F JNT L/S 1 LEV: CPT | Performed by: ANESTHESIOLOGY

## 2018-10-26 RX ORDER — BUPIVACAINE HYDROCHLORIDE 7.5 MG/ML
10 INJECTION, SOLUTION EPIDURAL; RETROBULBAR ONCE
Status: COMPLETED | OUTPATIENT
Start: 2018-10-26 | End: 2018-10-26

## 2018-10-26 RX ADMIN — BUPIVACAINE HYDROCHLORIDE 3 ML: 7.5 INJECTION, SOLUTION EPIDURAL; RETROBULBAR at 10:29

## 2018-10-26 NOTE — DISCHARGE INSTR - LAB

## 2018-10-26 NOTE — H&P
History of Present Illness: The patient is a 71 y o  female who presents with complaints of lower back pain secondary lumbar spondylosis and is here today for confirmatory medial branch blocks after undergoing diagnostic medial branch blocks with excellent relief      Patient Active Problem List   Diagnosis    Anxiety    Type II or unspecified type diabetes mellitus with other specified manifestations, uncontrolled    Depression    Chronic renal insufficiency    Chest pain on breathing    History of DVT (deep vein thrombosis)    PVC's (premature ventricular contractions)    CAD (coronary artery disease)    Essential hypertension    Hyperlipidemia    Hypothyroidism    Fibromyalgia    Spinal stenosis of lumbar region    Adenomatous polyp of colon    Vitreo-retinal adhesions    Vitamin D deficiency    Vitamin B12 deficiency    Ventral hernia    Vascular claudication (HCC)    Thickened endometrium    Spondylosis of lumbar region without myelopathy or radiculopathy    Spondylosis of cervical region without myelopathy or radiculopathy    Palpitations    Onychomycosis    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    Panic attack    Obesity    Myofascial pain syndrome    Retinal edema    Diabetic polyneuropathy associated with type 2 diabetes mellitus (HCC)    SARAY (obstructive sleep apnea)    Allergic rhinitis    Pulmonary nodules    Lumbar spondylosis       Past Medical History:   Diagnosis Date    Acute embolism and thrombosis of unspecified deep veins of unspecified lower extremity (HCC)     Last Assessed:  5/18/17    Anemia     Anosmia     Anxiety     Arthritis     Asthma     Back pain     Bilateral macular retinal edema     CAD (coronary artery disease)     Cataract     Cervical disc herniation     Cervical radiculopathy     Cervical spinal stenosis     Cervical spondylolysis     Chronic mastoiditis     Complex endometrial hyperplasia     Depression     [facility] [address] [facility]     DVT [facility]deep venous thrombosis) [facility] [address] [facility]     Fibromyalgia     Hyperlipidemia     Hypertension     Hypothyroid     Lumbar radiculopathy     Obese     Spinal stenosis     Stomach ulcer     Thyroid disease        Past Surgical History:   Procedure Laterality Date    BACK SURGERY      CARPAL TUNNEL RELEASE      CATARACT EXTRACTION      CHOLECYSTECTOMY      COLONOSCOPY      CORONARY ANGIOPLASTY      CORONARY ARTERY BYPASS GRAFT      CYSTOSCOPY N/A 6/20/2017    Procedure: Nomi Diaz;  Surgeon: Jessica Ventura MD;  [facility];  Service: Gynecology Oncology    [place] LAPAROSCOPY W TOT HYSTERECTUTERUS <=[address]  W TUBE/OVARY N/A 6/20/2017    Procedure: ROBOTIC HYSTERECTOMY; BILATERAL SALPINO-OOPHERECTOMY; umbilical hernia repair ;  Surgeon: Jessica Ventura MD;  [facility];  Service: Gynecology Oncology    TONSILECTOMY AND ADNOIDECTOMY      UMBILICAL HERNIA REPAIR           Current Outpatient Prescriptions:     albuterol (2 5 mg/3 mL) 0 083 % nebulizer solution, Take 3 mL (2 5 mg total) by nebulization every 6 (six) hours as needed for wheezing (Patient not taking: Reported on 9/14/2018 ), Disp: 75 mL, Rfl: 0    albuterol (PROAIR HFA) 90 mcg/act inhaler, Inhale 2 puffs, Disp: , Rfl:     ALPRAZolam (XANAX) 0 5 mg tablet, Take 1 tablet (0 5 mg total) by mouth 3 (three) times a day as needed for anxiety, Disp: 90 tablet, Rfl: 0    aspirin 81 MG tablet, Take 81 mg by mouth daily, Disp: , Rfl:     atorvastatin (LIPITOR) 40 mg tablet, Take 40 mg by mouth daily at bedtime  , Disp: , Rfl:     cholecalciferol (VITAMIN D3) 1,000 units tablet, Take 2,000 Units by mouth daily, Disp: , Rfl:     Co-Enzyme Q-10 100 MG CAPS, Take 1 capsule by mouth 2 (two) times a day  , Disp: , Rfl:     diltiazem (TIAZAC) 180 MG 24 hr capsule, , Disp: , Rfl:     DULoxetine (CYMBALTA) 60 mg delayed release capsule, Take 60 mg by mouth every morning, Disp: , Rfl:     enalapril (VASOTEC) 20 mg tablet, Take 20 mg by mouth daily, Disp: , Rfl:     insulin aspart (NovoLOG) 100 units/mL injection, Inject 12 Units under the skin 3 (three) times a day before meals, Disp: , Rfl: 0    insulin glargine (LANTUS) 100 units/mL subcutaneous injection, Inject 40 Units under the skin 2 (two) times a day, Disp: 10 mL, Rfl: 0    isosorbide mononitrate (IMDUR) 30 mg 24 hr tablet, TAKE 1 TABLET DAILY AT BEDTIME, 30 DAYS, #30, 4 REFILLS, EXTENDED RELEASE , Disp: , Rfl: 6    levothyroxine 50 mcg tablet, Take 50 mcg by mouth daily, Disp: , Rfl:     metFORMIN (GLUCOPHAGE) 500 mg tablet, Take 500 mg by mouth 2 (two) times a day with meals, Disp: , Rfl:     nitroglycerin (NITROLINGUAL) 0 4 mg/spray spray, USE ONE SPRAY Q 5 MINUTES AS NEEDED FOR CHEST PAIN  IF NO RELIEF, CALL 911 , Disp: , Rfl: 1    tiZANidine (ZANAFLEX) 2 mg tablet, Take 2 mg by mouth daily at bedtime, Disp: , Rfl:     Allergies   Allergen Reactions    Molds & Smuts     Other      RAGWEED, CAT DANDER, DOG DANDER     Pollen Extract Other (See Comments)     Cold symptoms         Physical Exam:   Vitals:    10/26/18 1006   BP: 148/83   Pulse: 84   Resp: 18   Temp: 98 3 °F (36 8 °C)   SpO2: 97%     General: Awake, Alert, Oriented x 3, Mood and affect appropriate  Respiratory: Respirations even and unlabored  Cardiovascular: Peripheral pulses intact; no edema  Musculoskeletal Exam:   Lower back tenderness over the facet joints    ASA Score: 2    Patient/Chart Verification  Patient ID Verified: Verbal  Consents Confirmed: Procedural, To be obtained in the Pre-Procedure area  H&P( within 30 days) Verified: To be obtained in the Pre-Procedure area  Interval H&P(within 24 hr) Complete (required for Outpatients and Surgery Admit only): To be obtained in the Pre-Procedure area  Allergies Reviewed: Yes  Anticoag/NSAID held?: NA  Currently on antibiotics?: No    Assessment:   1   Osteoarthritis of spine without myelopathy or radiculopathy, lumbar region        Plan: B/L L3-L5 MBB Confirmatory

## 2018-11-06 ENCOUNTER — TELEPHONE (OUTPATIENT)
Dept: PAIN MEDICINE | Facility: CLINIC | Age: 73
End: 2018-11-06

## 2018-11-06 NOTE — TELEPHONE ENCOUNTER
Patient  [phone]  Dr Garcia Presume    Patient would like for someone to call her about her getting her sx reschedule  Please give patient a call back  I tried to put patient through but tie line was not working for me

## 2018-11-21 DIAGNOSIS — F41.9 ANXIETY: ICD-10-CM

## 2018-11-22 RX ORDER — ALPRAZOLAM 0.5 MG/1
0.5 TABLET ORAL 3 TIMES DAILY PRN
Qty: 90 TABLET | Refills: 0 | Status: SHIPPED | OUTPATIENT
Start: 2018-11-22 | End: 2019-01-07 | Stop reason: SDUPTHER

## 2018-11-29 ENCOUNTER — TELEPHONE (OUTPATIENT)
Dept: RADIOLOGY | Facility: CLINIC | Age: 73
End: 2018-11-29

## 2018-11-29 ENCOUNTER — HOSPITAL ENCOUNTER (OUTPATIENT)
Dept: RADIOLOGY | Facility: CLINIC | Age: 73
Discharge: HOME | End: 2018-11-29
Attending: ANESTHESIOLOGY | Admitting: ANESTHESIOLOGY
Payer: COMMERCIAL

## 2018-11-29 VITALS
RESPIRATION RATE: 18 BRPM | DIASTOLIC BLOOD PRESSURE: 84 MMHG | HEART RATE: 78 BPM | TEMPERATURE: 98.5 F | SYSTOLIC BLOOD PRESSURE: 163 MMHG | OXYGEN SATURATION: 97 %

## 2018-11-29 DIAGNOSIS — M47.816 SPONDYLOSIS OF LUMBAR REGION WITHOUT MYELOPATHY OR RADICULOPATHY: ICD-10-CM

## 2018-11-29 PROCEDURE — 64635 DESTROY LUMB/SAC FACET JNT: CPT | Performed by: ANESTHESIOLOGY

## 2018-11-29 PROCEDURE — 64636 DESTROY L/S FACET JNT ADDL: CPT | Performed by: ANESTHESIOLOGY

## 2018-11-29 RX ORDER — 0.9 % SODIUM CHLORIDE 0.9 %
10 VIAL (ML) INJECTION ONCE
Status: COMPLETED | OUTPATIENT
Start: 2018-11-29 | End: 2018-11-29

## 2018-11-29 RX ORDER — BUPIVACAINE HCL/PF 2.5 MG/ML
30 VIAL (ML) INJECTION ONCE
Status: COMPLETED | OUTPATIENT
Start: 2018-11-29 | End: 2018-11-29

## 2018-11-29 RX ORDER — METHYLPREDNISOLONE ACETATE 80 MG/ML
80 INJECTION, SUSPENSION INTRA-ARTICULAR; INTRALESIONAL; INTRAMUSCULAR; PARENTERAL; SOFT TISSUE ONCE
Status: COMPLETED | OUTPATIENT
Start: 2018-11-29 | End: 2018-11-29

## 2018-11-29 RX ADMIN — Medication 3 ML: at 14:59

## 2018-11-29 RX ADMIN — METHYLPREDNISOLONE ACETATE 20 MG: 80 INJECTION, SUSPENSION INTRA-ARTICULAR; INTRALESIONAL; INTRAMUSCULAR; PARENTERAL; SOFT TISSUE at 14:59

## 2018-11-29 RX ADMIN — SODIUM CHLORIDE 9 ML: 9 INJECTION, SOLUTION INTRAMUSCULAR; INTRAVENOUS; SUBCUTANEOUS at 14:50

## 2018-11-29 RX ADMIN — Medication 9 ML: at 14:50

## 2018-11-29 NOTE — H&P
History of Present Illness: The patient is a 71 y o  female who presents with complaints of right lower back pain secondary lumbar spondylosis and is here today for right L3-5 medial branch radiofrequency ablation      Patient Active Problem List   Diagnosis    Anxiety    Type II or unspecified type diabetes mellitus with other specified manifestations, uncontrolled    Depression    Chronic renal insufficiency    Chest pain on breathing    History of DVT (deep vein thrombosis)    PVC's (premature ventricular contractions)    CAD (coronary artery disease)    Essential hypertension    Hyperlipidemia    Hypothyroidism    Fibromyalgia    Spinal stenosis of lumbar region    Adenomatous polyp of colon    Vitreo-retinal adhesions    Vitamin D deficiency    Vitamin B12 deficiency    Ventral hernia    Vascular claudication (HCC)    Thickened endometrium    Spondylosis of lumbar region without myelopathy or radiculopathy    Spondylosis of cervical region without myelopathy or radiculopathy    Palpitations    Onychomycosis    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    Panic attack    Obesity    Myofascial pain syndrome    Retinal edema    Diabetic polyneuropathy associated with type 2 diabetes mellitus (HCC)    SARAY (obstructive sleep apnea)    Allergic rhinitis    Pulmonary nodules    Lumbar spondylosis       Past Medical History:   Diagnosis Date    Acute embolism and thrombosis of unspecified deep veins of unspecified lower extremity (HCC)     Last Assessed:  5/18/17    Anemia     Anosmia     Anxiety     Arthritis     Asthma     Back pain     Bilateral macular retinal edema     CAD (coronary artery disease)     Cataract     Cervical disc herniation     Cervical radiculopathy     Cervical spinal stenosis     Cervical spondylolysis     Chronic mastoiditis     Complex endometrial hyperplasia     Depression     Diabetes mellitus ([facility] [address] [facility]     [facility]     Fibromyalgia     Hyperlipidemia     Hypertension     Hypothyroid     Lumbar radiculopathy     Obese     Spinal stenosis     Stomach ulcer     Thyroid disease        Past Surgical History:   Procedure Laterality Date    BACK SURGERY      CARPAL TUNNEL RELEASE      CATARACT EXTRACTION      CHOLECYSTECTOMY      COLONOSCOPY      CORONARY ANGIOPLASTY      CORONARY ARTERY BYPASS GRAFT      CYSTOSCOPY N/A 6/20/2017    Procedure: Reanna Dela Cruz;  Surgeon: Chelly John MD;  [facility];  Service: Gynecology Oncology    [place] LAPAROSCOPY W TOT HYSTERECTUTERUS <=250 [address]  W TUBE/OVARY N/A 6/20/2017    Procedure: ROBOTIC HYSTERECTOMY; BILATERAL SALPINO-OOPHERECTOMY; umbilical hernia repair ;  Surgeon: Chelly John MD;  [facility];  Service: Gynecology Oncology    TONSILECTOMY AND ADNOIDECTOMY      UMBILICAL HERNIA REPAIR           Current Outpatient Prescriptions:     albuterol (2 5 mg/3 mL) 0 083 % nebulizer solution, Take 3 mL (2 5 mg total) by nebulization every 6 (six) hours as needed for wheezing (Patient not taking: Reported on 9/14/2018 ), Disp: 75 mL, Rfl: 0    albuterol (PROAIR HFA) 90 mcg/act inhaler, Inhale 2 puffs, Disp: , Rfl:     ALPRAZolam (XANAX) 0 5 mg tablet, Take 1 tablet (0 5 mg total) by mouth 3 (three) times a day as needed for anxiety, Disp: 90 tablet, Rfl: 0    aspirin 81 MG tablet, Take 81 mg by mouth daily, Disp: , Rfl:     atorvastatin (LIPITOR) 40 mg tablet, Take 40 mg by mouth daily at bedtime  , Disp: , Rfl:     cholecalciferol (VITAMIN D3) 1,000 units tablet, Take 2,000 Units by mouth daily, Disp: , Rfl:     Co-Enzyme Q-10 100 MG CAPS, Take 1 capsule by mouth 2 (two) times a day  , Disp: , Rfl:     diltiazem (TIAZAC) 180 MG 24 hr capsule, , Disp: , Rfl:     DULoxetine (CYMBALTA) 60 mg delayed release capsule, Take 60 mg by mouth every morning, Disp: , Rfl:     enalapril (VASOTEC) 20 mg tablet, Take 20 mg by mouth daily, Disp: , Rfl:     insulin aspart (NovoLOG) 100 units/mL injection, Inject 12 Units under the skin 3 (three) times a day before meals, Disp: , Rfl: 0    insulin glargine (LANTUS) 100 units/mL subcutaneous injection, Inject 40 Units under the skin 2 (two) times a day, Disp: 10 mL, Rfl: 0    isosorbide mononitrate (IMDUR) 30 mg 24 hr tablet, TAKE 1 TABLET DAILY AT BEDTIME, 30 DAYS, #30, 4 REFILLS, EXTENDED RELEASE , Disp: , Rfl: 6    levothyroxine 50 mcg tablet, Take 50 mcg by mouth daily, Disp: , Rfl:     metFORMIN (GLUCOPHAGE) 500 mg tablet, Take 500 mg by mouth 2 (two) times a day with meals, Disp: , Rfl:     nitroglycerin (NITROLINGUAL) 0 4 mg/spray spray, USE ONE SPRAY Q 5 MINUTES AS NEEDED FOR CHEST PAIN  IF NO RELIEF, CALL 911 , Disp: , Rfl: 1    tiZANidine (ZANAFLEX) 2 mg tablet, Take 2 mg by mouth daily at bedtime, Disp: , Rfl:     Allergies   Allergen Reactions    Molds & Smuts     Other      RAGWEED, CAT DANDER, DOG DANDER     Pollen Extract Other (See Comments)     Cold symptoms         Physical Exam:   Vitals:    11/29/18 1431   BP: 169/76   Pulse: 74   Resp: 18   Temp: 98 5 °F (36 9 °C)   SpO2: 98%     General: Awake, Alert, Oriented x 3, Mood and affect appropriate  Respiratory: Respirations even and unlabored  Cardiovascular: Peripheral pulses intact; no edema  Musculoskeletal Exam:   Right lower back tenderness    ASA Score: 2    Patient/Chart Verification  Patient ID Verified: Verbal  ID Band Applied: No  Consents Confirmed: Procedural, To be obtained in the Pre-Procedure area  H&P( within 30 days) Verified: To be obtained in the Pre-Procedure area  Interval H&P(within 24 hr) Complete (required for Outpatients and Surgery Admit only): To be obtained in the Pre-Procedure area  Allergies Reviewed: Yes  Anticoag/NSAID held?: No  Currently on antibiotics?: No    Assessment:   1   Spondylosis of lumbar region without myelopathy or radiculopathy        Plan: R L3-L5 RFA

## 2018-11-29 NOTE — DISCHARGE INSTR - LAB

## 2018-11-30 ENCOUNTER — HOSPITAL ENCOUNTER (OUTPATIENT)
Dept: SLEEP CENTER | Facility: CLINIC | Age: 73
Discharge: HOME | End: 2018-11-30
Payer: COMMERCIAL

## 2018-11-30 DIAGNOSIS — G47.33 OSA (OBSTRUCTIVE SLEEP APNEA): ICD-10-CM

## 2018-11-30 PROCEDURE — G0399 HOME SLEEP TEST/TYPE 3 PORTA: HCPCS | Performed by: INTERNAL MEDICINE

## 2018-11-30 PROCEDURE — G0399 HOME SLEEP TEST/TYPE 3 PORTA: HCPCS

## 2018-11-30 NOTE — TELEPHONE ENCOUNTER
S/W pt who reports she had some soreness when she first got up, no pain meds taken, she said it got better the more she was up and moving around  Pt said she doesn't like to take pain meds and normally goes without  I advised pt she could try ice or heat as needed for the soreness  Pt denies sunburn like sensation in procedure area  Pt hasn't taken her shower yet, she will remove the band aids then  Pt told to contact office with any s/s of infection of inj sites  Pt informed it takes 4-6 wks to see the full effect from the ablation  I confirmed left sided RFA for 12/13, arriving at 3:05  Pt to call with any questions or concerns

## 2018-12-05 ENCOUNTER — OFFICE VISIT (OUTPATIENT)
Dept: INTERNAL MEDICINE CLINIC | Facility: CLINIC | Age: 73
End: 2018-12-05
Payer: COMMERCIAL

## 2018-12-05 VITALS
BODY MASS INDEX: 37.21 KG/M2 | SYSTOLIC BLOOD PRESSURE: 144 MMHG | HEART RATE: 64 BPM | HEIGHT: 62 IN | WEIGHT: 202.2 LBS | RESPIRATION RATE: 16 BRPM | OXYGEN SATURATION: 97 % | DIASTOLIC BLOOD PRESSURE: 70 MMHG

## 2018-12-05 DIAGNOSIS — E11.42 DIABETIC POLYNEUROPATHY ASSOCIATED WITH TYPE 2 DIABETES MELLITUS (HCC): ICD-10-CM

## 2018-12-05 DIAGNOSIS — E66.9 CLASS 2 OBESITY WITH BODY MASS INDEX (BMI) OF 39.0 TO 39.9 IN ADULT, UNSPECIFIED OBESITY TYPE, UNSPECIFIED WHETHER SERIOUS COMORBIDITY PRESENT: ICD-10-CM

## 2018-12-05 DIAGNOSIS — Z00.00 MEDICARE ANNUAL WELLNESS VISIT, SUBSEQUENT: ICD-10-CM

## 2018-12-05 DIAGNOSIS — E11.8 TYPE 2 DIABETES MELLITUS WITH COMPLICATION, WITH LONG-TERM CURRENT USE OF INSULIN (HCC): ICD-10-CM

## 2018-12-05 DIAGNOSIS — E03.9 HYPOTHYROIDISM, UNSPECIFIED TYPE: Chronic | ICD-10-CM

## 2018-12-05 DIAGNOSIS — Z79.4 TYPE 2 DIABETES MELLITUS WITH COMPLICATION, WITH LONG-TERM CURRENT USE OF INSULIN (HCC): ICD-10-CM

## 2018-12-05 DIAGNOSIS — Z11.59 ENCOUNTER FOR HEPATITIS C SCREENING TEST FOR LOW RISK PATIENT: Primary | ICD-10-CM

## 2018-12-05 PROCEDURE — 1125F AMNT PAIN NOTED PAIN PRSNT: CPT | Performed by: INTERNAL MEDICINE

## 2018-12-05 PROCEDURE — 99214 OFFICE O/P EST MOD 30 MIN: CPT | Performed by: INTERNAL MEDICINE

## 2018-12-05 PROCEDURE — 1170F FXNL STATUS ASSESSED: CPT | Performed by: INTERNAL MEDICINE

## 2018-12-05 PROCEDURE — G0439 PPPS, SUBSEQ VISIT: HCPCS | Performed by: INTERNAL MEDICINE

## 2018-12-05 NOTE — PROGRESS NOTES
Assessment and Plan:    Problem List Items Addressed This Visit        Endocrine    Type 2 diabetes mellitus with complication, with long-term current use of insulin ([facility] ) (Chronic)     Lab Results   Component Value Date    HGBA1C 9 7 (H) 04/13/2018       No results for input(s): POCGLU in the last 72 hours  Blood Sugar Average: Last 72 hrs:   suboptimal control I have counseled patient to follow a healthy/balance diet, reduce carbohydrates and sweets ultimately we would like to obtain a hemoglobin A1c under 7  She continues to work with Endocrinology         Relevant Orders    Ambulatory referral to Ophthalmology    Hypothyroid (Chronic)     Hypothyroidism controlled the patient is currently euthyroid I will be ordering a TSH prior to the next office visit and the patient will continue with current medical regiment; we will continue to monitor the patient's progress  Relevant Orders    TSH, 3rd generation    Diabetic polyneuropathy associated with type 2 diabetes mellitus ([facility] )     Lab Results   Component Value Date    HGBA1C 9 7 (H) 04/13/2018       No results for input(s): POCGLU in the last 72 hours  Blood Sugar Average: Last 72 hrs:   suboptimal control I have counselled the pt to follow a healthy and balanced diet ,and recommend routine exercise  I will be ordering diabetic laboratories including comprehensive metabolic panel, hemoglobin A1c, urine microalbumin, lipid panel  She is working with Endocrinology Dr Nimesh Melissa  She will continue Glucophage 500 mg twice a day, NovoLog, Lantus  Other    Obesity     Obesity -I have counseled patient following healthy and balanced diet, I would like the patient to lose weight, I would like the patient exercise routinely; we will continue monitor the patient's progress           Encounter for hepatitis C screening test for low risk patient - Primary     Counseled, will check a hepatitis C antibody         Relevant Orders    Hepatitis C antibody Medicare annual wellness visit, subsequent     Assessment and plan 1  Subsequent Medicare annual wellness examination overall the patient is clinically stable and doing well, we encouraged the patient to follow a healthy and balanced diet  We recommend that the patient exercise routinely approximately 30 minutes 5 times per week   We have reviewed the patient's vaccines and have made recommendations for updates if necessary recommend the new shingles vaccine       We will be ordering screening laboratories which are age appropriate  Return to the office in  4 months    call if any problems  Health Maintenance Due   Topic Date Due    Hepatitis C Screening  1945    SLP PLAN OF CARE  1945    Pneumococcal PPSV23/PCV13 65+ Years / Low and Medium Risk (2 of 2 - PPSV23) 12/17/2015    MAMMOGRAM  05/10/2018    DM Eye Exam  07/26/2018    HEMOGLOBIN A1C  10/13/2018    Diabetic Foot Exam  11/21/2018         HPI:  Sana Tejada is a 71 y o  female here for her Subsequent Wellness Visit      Patient Active Problem List   Diagnosis    Anxiety    Type 2 diabetes mellitus with complication, with long-term current use of insulin (HCC)    Depression    Chronic renal insufficiency    Chest pain on breathing    History of DVT (deep vein thrombosis)    PVC's (premature ventricular contractions)    CAD (coronary artery disease)    Essential hypertension    Hyperlipidemia    Hypothyroid    Fibromyalgia    Spinal stenosis of lumbar region    Adenomatous polyp of colon    Vitreo-retinal adhesions    Vitamin D deficiency    Vitamin B12 deficiency    Ventral hernia    Vascular claudication (HCC)    Thickened endometrium    Spondylosis of lumbar region without myelopathy or radiculopathy    Spondylosis of cervical region without myelopathy or radiculopathy    Palpitations    Onychomycosis    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  Panic attack    Obesity    Myofascial pain syndrome    Retinal edema    Diabetic polyneuropathy associated with type 2 diabetes mellitus (HCC)    SARAY (obstructive sleep apnea)    Allergic rhinitis    Pulmonary nodules    Lumbar spondylosis    Encounter for hepatitis C screening test for low risk patient    Medicare annual wellness visit, subsequent     Past Medical History:   Diagnosis Date    Acute embolism and thrombosis of unspecified deep veins of unspecified lower extremity (HCC)     Last Assessed:  5/18/17    Anemia     Anosmia     Anxiety     Arthritis     Asthma     Back pain     Bilateral macular retinal edema     CAD (coronary artery disease)     Cataract     Cervical disc herniation     Cervical radiculopathy     Cervical spinal stenosis     Cervical spondylolysis     Chronic mastoiditis     Complex endometrial hyperplasia     Depression     Diabetes mellitus ([facility] )     DVT (deep venous thrombosis) (HCC)     Fibromyalgia     Hyperlipidemia     Hypertension     Hypothyroid     Lumbar radiculopathy     Obese     Spinal stenosis     Stomach ulcer     Thyroid disease      Past Surgical History:   Procedure Laterality Date    BACK SURGERY      CARPAL TUNNEL RELEASE      CATARACT EXTRACTION      CHOLECYSTECTOMY      COLONOSCOPY      CORONARY ANGIOPLASTY      CORONARY ARTERY BYPASS GRAFT      CYSTOSCOPY N/A 6/20/2017    Procedure: Mary Carmen Trevino;  Surgeon: Jo Tamayo MD;  [facility]: [facility];  Service: Gynecology Oncology    PA LAPAROSCOPY W TOT HYSTERECTUTERUS <=250 GRAM  W TUBE/OVARY N/A 6/20/2017    Procedure: ROBOTIC HYSTERECTOMY; BILATERAL SALPINO-OOPHERECTOMY; umbilical hernia repair ;  Surgeon: Jo Tamayo MD;  [facility]: [facility];  Service: Gynecology Oncology    TONSILECTOMY AND ADNOIDECTOMY      UMBILICAL HERNIA REPAIR       Family History   Problem Relation Age of Onset    Arthritis Mother     Leukemia Mother    Jose Eduardo Sarabia Other Mother Anxiety, major depressive disorder, recurrent episode with atypical features    Coronary artery disease Father         Heart problem    Diabetes Father     Other Father         Infectious disease    Alzheimer's disease Maternal Grandmother     Other Maternal Grandfather         Heart problem    Other Daughter         Anxiety, major depressive disorder, recurrent episode with atypical features    Alcohol abuse Other         Grandparent    Cancer Family     Diabetes Family     Hypertension Family     Other Family         Reported prior back trouble, thyroid disorder     History   Smoking Status    Former Smoker    Packs/day: 1 00    Years: 6 00    Types: Cigarettes    Quit date: 1984   Smokeless Tobacco    Never Used     Comment: Smoked about a half a pack for about 4 yrs  Quite about 50 yrs ago       History   Alcohol Use No      History   Drug Use No       Current Outpatient Prescriptions   Medication Sig Dispense Refill    albuterol (2 5 mg/3 mL) 0 083 % nebulizer solution Take 3 mL (2 5 mg total) by nebulization every 6 (six) hours as needed for wheezing 75 mL 0    albuterol (PROAIR HFA) 90 mcg/act inhaler Inhale 2 puffs      ALPRAZolam (XANAX) 0 5 mg tablet Take 1 tablet (0 5 mg total) by mouth 3 (three) times a day as needed for anxiety 90 tablet 0    aspirin 81 MG tablet Take 81 mg by mouth daily      atorvastatin (LIPITOR) 40 mg tablet Take 40 mg by mouth daily at bedtime        cholecalciferol (VITAMIN D3) 1,000 units tablet Take 2,000 Units by mouth daily      Co-Enzyme Q-10 100 MG CAPS Take 1 capsule by mouth 2 (two) times a day        diltiazem (TIAZAC) 180 MG 24 hr capsule       DULoxetine (CYMBALTA) 60 mg delayed release capsule Take 60 mg by mouth every morning      enalapril (VASOTEC) 20 mg tablet Take 20 mg by mouth daily      insulin aspart (NovoLOG) 100 units/mL injection Inject 12 Units under the skin 3 (three) times a day before meals  0    insulin glargine (LANTUS) 100 units/mL subcutaneous injection Inject 40 Units under the skin 2 (two) times a day 10 mL 0    isosorbide mononitrate (IMDUR) 30 mg 24 hr tablet TAKE 1 TABLET DAILY AT BEDTIME, 30 DAYS, #30, 4 REFILLS, EXTENDED RELEASE   6    levothyroxine 50 mcg tablet Take 50 mcg by mouth daily      metFORMIN (GLUCOPHAGE) 500 mg tablet Take 500 mg by mouth 2 (two) times a day with meals      nitroglycerin (NITROLINGUAL) 0 4 mg/spray spray USE ONE SPRAY Q 5 MINUTES AS NEEDED FOR CHEST PAIN  IF NO RELIEF, CALL 911   1    tiZANidine (ZANAFLEX) 2 mg tablet Take 2 mg by mouth daily at bedtime       No current facility-administered medications for this visit  Allergies   Allergen Reactions    Molds & Smuts     Other      RAGWEED, CAT DANDER, DOG DANDER     Pollen Extract Other (See Comments)     Cold symptoms       Immunization History   Administered Date(s) Administered    Influenza 11/04/2008, 11/01/2011    Influenza Split High Dose Preservative Free IM 10/07/2015, 12/01/2016, 10/02/2017    Influenza TIV (IM) 10/20/2012, 10/05/2013, 09/16/2014    Influenza, high dose seasonal 0 5 mL 09/07/2018    Pneumococcal Conjugate 13-Valent 12/17/2014       Patient Care Team:  Stalin Rubalcava DO as PCP - [facility] Aleshia, MD Stalin Rubalcava, MD Reagan Christopher MD Alyn Fergusson, MD Paris Brunette, MD Algernon Paula, MD Harding Ranger, MD Ginette Art, MD    Medicare Screening Tests and Risk Assessments:  Jule Baumgarten is here for her Subsequent Wellness visit  Health Risk Assessment:  Patient rates overall health as good  Patient feels that their physical health rating is Same  Eyesight was rated as Same  Hearing was rated as Same  Patient feels that their emotional and mental health rating is Same  Pain experienced by patient in the last 7 days has been None  Patient states that she has experienced no weight loss or gain in last 6 months       Emotional/Mental Health:  Patient has been feeling nervous/anxious  PHQ-9 Depression Screening:    Frequency of the following problems over the past two weeks:      1  Little interest or pleasure in doing things: 0 - not at all      2  Feeling down, depressed, or hopeless: 0 - not at all  PHQ-2 Score: 0          Broken Bones/Falls: Fall Risk Assessment:    In the past year, patient has experienced: History of falling in past year     Number of falls: 2 or more          Bladder/Bowel:  Patient has not leaked urine accidently in the last six months  Patient reports no loss of bowel control  Immunizations:  Patient has had a flu vaccination within the last year  Patient has received a pneumonia shot  Patient has received a shingles shot  Patient has received tetanus/diphtheria shot  Home Safety:  Patient has trouble with stairs inside or outside of their home  Patient currently reports that there are no safety hazards present in home, working smoke alarms, working carbon monoxide detectors  Preventative Screenings:   Breast cancer screening performed, colon cancer screen completed, no cholesterol screen completed, glaucoma eye exam completed,     Nutrition:  Current diet: Regular with servings of the following:    Medications:  Patient is currently taking over-the-counter supplements  Patient is able to manage medications  Lifestyle Choices:  Patient reports no tobacco use  Patient has not smoked or used tobacco in the past   Patient reports no alcohol use  Patient drives a vehicle  Patient wears seat belt  Activities of Daily Living:  Can get out of bed by his or her self, able to dress self, able to make own meals, able to do own shopping, able to bathe self, can do own laundry/housekeeping, can manage own money, pay bills and track expenses    Previous Hospitalizations:  No hospitalization or ED visit in past 12 months        Advanced Directives:  Patient has decided on a power of     Patient has spoken to designated power of   Patient has completed advanced directive          Preventative Screening/Counseling:      Cardiovascular:      Counseling: Healthy Diet, Healthy Weight and Improve Cholesterol          Diabetes:      Counseling: Healthy Diet, Healthy Weight and Improve Physical Activity          Colorectal Cancer:      General: Screening Current and Risks and Benefits Discussed          Breast Cancer:      General: Risks and Benefits Discussed      Comments: Plans to go in next few months        Cervical Cancer:      General: Risks and Benefits Discussed      Comments: Pt is working on finding a new one         Osteoporosis:      General: Patient Declines          AAA:      General: Screening Not Indicated          Glaucoma:      General: Risks and Benefits Discussed and Screening Current          HIV:      General: Screening Not Indicated          Hepatitis C:      General: Risks and Benefits Discussed      Counseling: has received general HCV counseling        Advanced Directives:   Patient has living will for healthcare,

## 2018-12-05 NOTE — PROGRESS NOTES
Assessment/Plan:           Problem List Items Addressed This Visit        Endocrine    Type 2 diabetes mellitus with complication, with long-term current use of insulin (HCC) (Chronic)     Lab Results   Component Value Date    HGBA1C 9 7 (H) 04/13/2018       No results for input(s): POCGLU in the last 72 hours  Blood Sugar Average: Last 72 hrs:   suboptimal control I have counseled patient to follow a healthy/balance diet, reduce carbohydrates and sweets ultimately we would like to obtain a hemoglobin A1c under 7  She continues to work with Endocrinology         Relevant Orders    Ambulatory referral to Ophthalmology    Hypothyroid (Chronic)     Hypothyroidism controlled the patient is currently euthyroid I will be ordering a TSH prior to the next office visit and the patient will continue with current medical regiment; we will continue to monitor the patient's progress  Relevant Orders    TSH, 3rd generation    Diabetic polyneuropathy associated with type 2 diabetes mellitus ([facility] [address] )     Lab Results   Component Value Date    HGBA1C 9 7 (H) 04/13/2018       No results for input(s): POCGLU in the last 72 hours  Blood Sugar Average: Last 72 hrs:   suboptimal control I have counselled the pt to follow a healthy and balanced diet ,and recommend routine exercise  I will be ordering diabetic laboratories including comprehensive metabolic panel, hemoglobin A1c, urine microalbumin, lipid panel  She is working with Endocrinology Dr Rosea Galeazzi  She will continue Glucophage 500 mg twice a day, NovoLog, Lantus  Other    Obesity     Obesity -I have counseled patient following healthy and balanced diet, I would like the patient to lose weight, I would like the patient exercise routinely; we will continue monitor the patient's progress           Encounter for hepatitis C screening test for low risk patient - Primary     Counseled, will check a hepatitis C antibody         Relevant Orders    Hepatitis C antibody          Return to office 6  months  call if any problems  Subjective:      Patient ID: Lucien Delgado is a 71 y o  female  HPI 68-year old female coming in for a follow up office visit regarding type 2 diabetes, hypothyroidism, diabetic polyneuropathy, screening for hep C, anxiety; The patient reports me compliant taking medications without untoward side effects the  The patient is here to review his medical condition, update me on the medical condition and the patient reports me no hospitalizations and no ER visits  She reports me being under a lot of stress secondary to her mother who lives in her home who is very controlling no suicidal ideation    The following portions of the patient's history were reviewed and updated as appropriate: allergies, current medications, past family history, past medical history, past social history, past surgical history and problem list     Review of Systems   Constitutional: Negative for activity change, appetite change and unexpected weight change  HENT: Negative for congestion and postnasal drip  Eyes: Negative for visual disturbance  Respiratory: Negative for cough and shortness of breath  Cardiovascular: Negative for chest pain  Gastrointestinal: Negative for abdominal pain, diarrhea, nausea and vomiting  Neurological: Negative for dizziness, light-headedness and headaches  Hematological: Negative for adenopathy  Psychiatric/Behavioral: Negative for suicidal ideas  The patient is nervous/anxious  Objective:                    No Follow-up on file        Allergies   Allergen Reactions    Molds & Smuts     Other      RAGWEED, CAT DANDER, DOG DANDER     Pollen Extract Other (See Comments)     Cold symptoms         Past Medical History:   Diagnosis Date    Acute embolism and thrombosis of unspecified deep veins of unspecified lower extremity (HCC)     Last Assessed:  5/18/17    Anemia     Anosmia     Anxiety     Arthritis     Asthma     Back pain     Bilateral macular retinal edema     CAD (coronary artery disease)     Cataract     Cervical disc herniation     Cervical radiculopathy     Cervical spinal stenosis     Cervical spondylolysis     Chronic mastoiditis     Complex endometrial hyperplasia     Depression     Diabetes mellitus ([address] )     [facility] thrombosis) ([facility])     Fibromyalgia     Hyperlipidemia     Hypertension     Hypothyroid     Lumbar radiculopathy     Obese     Spinal stenosis     Stomach ulcer     Thyroid disease      Past Surgical History:   Procedure Laterality Date    BACK SURGERY      CARPAL TUNNEL RELEASE      CATARACT EXTRACTION      CHOLECYSTECTOMY      COLONOSCOPY      CORONARY ANGIOPLASTY      CORONARY ARTERY BYPASS GRAFT      CYSTOSCOPY N/A 6/20/2017    Procedure: Julia Amend;  Surgeon: Hunter Mazariegos MD;  [facility];  Service: Gynecology Oncology    [place] LAPAROSCOPY W TOT HYSTERECTUTERUS <=250 Gloria Drummer  W TUBE/OVARY N/A 6/20/2017    Procedure: ROBOTIC HYSTERECTOMY; BILATERAL SALPINO-OOPHERECTOMY; umbilical hernia repair ;  Surgeon: Hunter Mazariegos MD;  [facility];  Service: Gynecology Oncology    TONSILECTOMY AND ADNOIDECTOMY      UMBILICAL HERNIA REPAIR       Current Outpatient Prescriptions on File Prior to Visit   Medication Sig Dispense Refill    albuterol (2 5 mg/3 mL) 0 083 % nebulizer solution Take 3 mL (2 5 mg total) by nebulization every 6 (six) hours as needed for wheezing 75 mL 0    albuterol (PROAIR HFA) 90 mcg/act inhaler Inhale 2 puffs      ALPRAZolam (XANAX) 0 5 mg tablet Take 1 tablet (0 5 mg total) by mouth 3 (three) times a day as needed for anxiety 90 tablet 0    aspirin 81 MG tablet Take 81 mg by mouth daily      atorvastatin (LIPITOR) 40 mg tablet Take 40 mg by mouth daily at bedtime        cholecalciferol (VITAMIN D3) 1,000 units tablet Take 2,000 Units by mouth daily      Co-Enzyme Q-10 100 MG CAPS Take 1 capsule by mouth 2 (two) times a day        diltiazem (TIAZAC) 180 MG 24 hr capsule       DULoxetine (CYMBALTA) 60 mg delayed release capsule Take 60 mg by mouth every morning      enalapril (VASOTEC) 20 mg tablet Take 20 mg by mouth daily      insulin aspart (NovoLOG) 100 units/mL injection Inject 12 Units under the skin 3 (three) times a day before meals  0    insulin glargine (LANTUS) 100 units/mL subcutaneous injection Inject 40 Units under the skin 2 (two) times a day 10 mL 0    isosorbide mononitrate (IMDUR) 30 mg 24 hr tablet TAKE 1 TABLET DAILY AT BEDTIME, 30 DAYS, #30, 4 REFILLS, EXTENDED RELEASE   6    levothyroxine 50 mcg tablet Take 50 mcg by mouth daily      metFORMIN (GLUCOPHAGE) 500 mg tablet Take 500 mg by mouth 2 (two) times a day with meals      nitroglycerin (NITROLINGUAL) 0 4 mg/spray spray USE ONE SPRAY Q 5 MINUTES AS NEEDED FOR CHEST PAIN  IF NO RELIEF, CALL 911   1    tiZANidine (ZANAFLEX) 2 mg tablet Take 2 mg by mouth daily at bedtime       No current facility-administered medications on file prior to visit        Family History   Problem Relation Age of Onset    Arthritis Mother     Leukemia Mother     Other Mother         Anxiety, major depressive disorder, recurrent episode with atypical features    Coronary artery disease Father         Heart problem    Diabetes Father     Other Father         Infectious disease    Alzheimer's disease Maternal Grandmother     Other Maternal Grandfather         Heart problem    Other Daughter         Anxiety, major depressive disorder, recurrent episode with atypical features    Alcohol abuse Other         Grandparent    Cancer Family     Diabetes Family     Hypertension Family     Other Family         Reported prior back trouble, thyroid disorder     Social History     Social History    Marital status: /Civil Union     Spouse name: N/A    Number of children: 2    Years of education: High school or GED     Occupational History    Retired Social History Main Topics    Smoking status: Former Smoker     Packs/day: 1 00     Years: 6 00     Types: Cigarettes     Quit date: 1984    Smokeless tobacco: Never Used      Comment: Smoked about a half a pack for about 4 yrs  Quite about 50 yrs ago   Alcohol use No    Drug use: No    Sexual activity: No      Comment: No known STD risk factors     Other Topics Concern    Not on file     Social History Narrative    Lives independently with spouse    No known risk factors    Denied:  Exercising regularly     Vitals:    12/05/18 1349   BP: 144/70   BP Location: Left arm   Patient Position: Sitting   Cuff Size: Standard   Pulse: 64   Resp: 16   SpO2: 97%   Weight: 91 7 kg (202 lb 3 2 oz)   Height: 5' 1 5" (1 562 m)     Results for orders placed or performed during the hospital encounter of 04/24/18   Blood culture #1   Result Value Ref Range    Blood Culture Staphylococcus coagulase negative (A)     Gram Stain Result Gram positive cocci in clusters    Blood culture #2   Result Value Ref Range    Blood Culture No Growth After 5 Days      CBC and differential   Result Value Ref Range    WBC 7 85 4 31 - 10 16 Thousand/uL    RBC 3 72 (L) 3 81 - 5 12 Million/uL    Hemoglobin 9 9 (L) 11 5 - 15 4 g/dL    Hematocrit 30 6 (L) 34 8 - 46 1 %    MCV 82 82 - 98 fL    MCH 26 6 (L) 26 8 - 34 3 pg    MCHC 32 4 31 4 - 37 4 g/dL    RDW 15 4 (H) 11 6 - 15 1 %    MPV 10 3 8 9 - 12 7 fL    Platelets 745 385 - 590 Thousands/uL    Neutrophils Relative 65 43 - 75 %    Lymphocytes Relative 27 14 - 44 %    Monocytes Relative 7 4 - 12 %    Eosinophils Relative 1 0 - 6 %    Basophils Relative 0 0 - 1 %    Neutrophils Absolute 5 13 1 85 - 7 62 Thousands/µL    Lymphocytes Absolute 2 12 0 60 - 4 47 Thousands/µL    Monocytes Absolute 0 51 0 17 - 1 22 Thousand/µL    Eosinophils Absolute 0 08 0 00 - 0 61 Thousand/µL    Basophils Absolute 0 01 0 00 - 0 10 Thousands/µL   Protime-INR   Result Value Ref Range    Protime 13 1 12 1 - 14 4 seconds    INR 0 96 0 86 - 1 16   APTT   Result Value Ref Range    PTT 28 23 - 35 seconds   Comprehensive metabolic panel   Result Value Ref Range    Sodium 136 136 - 145 mmol/L    Potassium 5 6 (H) 3 5 - 5 3 mmol/L    Chloride 103 100 - 108 mmol/L    CO2 22 21 - 32 mmol/L    ANION GAP 11 4 - 13 mmol/L    BUN 23 5 - 25 mg/dL    Creatinine 1 42 (H) 0 60 - 1 30 mg/dL    Glucose 177 (H) 65 - 140 mg/dL    Calcium 9 2 8 3 - 10 1 mg/dL    AST 26 5 - 45 U/L    ALT 31 12 - 78 U/L    Alkaline Phosphatase 129 (H) 46 - 116 U/L    Total Protein 8 1 6 4 - 8 2 g/dL    Albumin 3 2 (L) 3 5 - 5 0 g/dL    Total Bilirubin 0 30 0 20 - 1 00 mg/dL    eGFR 37 ml/min/1 73sq m   Troponin I   Result Value Ref Range    Troponin I <0 02 <=0 04 ng/mL   B-type natriuretic peptide   Result Value Ref Range    NT-proBNP 46 <125 pg/mL   ECG 12 lead   Result Value Ref Range    Ventricular Rate 73 BPM    Atrial Rate 78 BPM    AL Interval 166 ms    QRSD Interval 92 ms    QT Interval 390 ms    QTC Interval 429 ms    P Axis 73 degrees    QRS Axis -7 degrees    T Wave Axis 119 degrees     Weight (last 2 days)     None        Body mass index is 37 59 kg/m²  BP      Temp      Pulse     Resp      SpO2        Vitals:    12/05/18 1349   Weight: 91 7 kg (202 lb 3 2 oz)     Vitals:    12/05/18 1349   Weight: 91 7 kg (202 lb 3 2 oz)       /70 (BP Location: Left arm, Patient Position: Sitting, Cuff Size: Standard)   Pulse 64   Resp 16   Ht 5' 1 5" (1 562 m)   Wt 91 7 kg (202 lb 3 2 oz)   SpO2 97%   BMI 37 59 kg/m²          Physical Exam   Constitutional: She appears well-developed and well-nourished  HENT:   Head: Normocephalic  Mouth/Throat: Oropharynx is clear and moist    Eyes: Pupils are equal, round, and reactive to light  Conjunctivae are normal  Right eye exhibits no discharge  Left eye exhibits no discharge  No scleral icterus  Neck: Neck supple  Cardiovascular: Normal rate, regular rhythm, normal heart sounds and intact distal pulses  Exam reveals no gallop and no friction rub  No murmur heard  Pulmonary/Chest: Breath sounds normal  No respiratory distress  She has no wheezes  She has no rales  Abdominal: Soft  Bowel sounds are normal  She exhibits no distension and no mass  There is no tenderness  There is no rebound and no guarding  Musculoskeletal: She exhibits no edema or deformity  Lymphadenopathy:     She has no cervical adenopathy  Neurological: She is alert  Coordination normal    Psychiatric: Her mood appears anxious  She does not exhibit a depressed mood  She expresses no suicidal ideation

## 2018-12-06 NOTE — ASSESSMENT & PLAN NOTE
Lab Results   Component Value Date    HGBA1C 9 7 (H) 04/13/2018       No results for input(s): POCGLU in the last 72 hours  Blood Sugar Average: Last 72 hrs:   suboptimal control I have counselled the pt to follow a healthy and balanced diet ,and recommend routine exercise  I will be ordering diabetic laboratories including comprehensive metabolic panel, hemoglobin A1c, urine microalbumin, lipid panel  She is working with Endocrinology Dr Rosea Galeazzi  She will continue Glucophage 500 mg twice a day, NovoLog, Lantus

## 2018-12-09 PROBLEM — Z00.00 MEDICARE ANNUAL WELLNESS VISIT, SUBSEQUENT: Status: ACTIVE | Noted: 2018-12-09

## 2018-12-09 NOTE — ASSESSMENT & PLAN NOTE
Assessment and plan 1  Subsequent Medicare annual wellness examination overall the patient is clinically stable and doing well, we encouraged the patient to follow a healthy and balanced diet  We recommend that the patient exercise routinely approximately 30 minutes 5 times per week   We have reviewed the patient's vaccines and have made recommendations for updates if necessary recommend the new shingles vaccine       We will be ordering screening laboratories which are age appropriate  Return to the office in  4 months    call if any problems

## 2018-12-11 ENCOUNTER — TELEPHONE (OUTPATIENT)
Dept: SLEEP CENTER | Facility: CLINIC | Age: 73
End: 2018-12-11

## 2018-12-12 RX ORDER — ROSUVASTATIN CALCIUM 20 MG/1
20 TABLET, COATED ORAL EVERY EVENING
Refills: 2 | COMMUNITY
Start: 2018-12-04

## 2018-12-13 ENCOUNTER — OFFICE VISIT (OUTPATIENT)
Dept: PULMONOLOGY | Facility: CLINIC | Age: 73
End: 2018-12-13
Payer: COMMERCIAL

## 2018-12-13 ENCOUNTER — TELEPHONE (OUTPATIENT)
Dept: RADIOLOGY | Facility: CLINIC | Age: 73
End: 2018-12-13

## 2018-12-13 ENCOUNTER — HOSPITAL ENCOUNTER (OUTPATIENT)
Dept: RADIOLOGY | Facility: CLINIC | Age: 73
Discharge: HOME | End: 2018-12-13
Attending: ANESTHESIOLOGY
Payer: COMMERCIAL

## 2018-12-13 VITALS
DIASTOLIC BLOOD PRESSURE: 91 MMHG | TEMPERATURE: 98.5 F | HEART RATE: 99 BPM | SYSTOLIC BLOOD PRESSURE: 134 MMHG | OXYGEN SATURATION: 98 % | RESPIRATION RATE: 20 BRPM

## 2018-12-13 VITALS
HEART RATE: 75 BPM | OXYGEN SATURATION: 98 % | DIASTOLIC BLOOD PRESSURE: 70 MMHG | RESPIRATION RATE: 14 BRPM | SYSTOLIC BLOOD PRESSURE: 150 MMHG | HEIGHT: 62 IN | BODY MASS INDEX: 36.99 KG/M2 | TEMPERATURE: 97.7 F | WEIGHT: 201 LBS

## 2018-12-13 DIAGNOSIS — J45.909 UNCOMPLICATED ASTHMA, UNSPECIFIED ASTHMA SEVERITY, UNSPECIFIED WHETHER PERSISTENT: Primary | ICD-10-CM

## 2018-12-13 DIAGNOSIS — G47.33 OSA (OBSTRUCTIVE SLEEP APNEA): ICD-10-CM

## 2018-12-13 DIAGNOSIS — R91.1 PULMONARY NODULE: ICD-10-CM

## 2018-12-13 DIAGNOSIS — M47.816 SPONDYLOSIS OF LUMBAR REGION WITHOUT MYELOPATHY OR RADICULOPATHY: ICD-10-CM

## 2018-12-13 DIAGNOSIS — J30.89 ALLERGIC RHINITIS DUE TO OTHER ALLERGIC TRIGGER, UNSPECIFIED SEASONALITY: ICD-10-CM

## 2018-12-13 PROCEDURE — 64635 DESTROY LUMB/SAC FACET JNT: CPT | Performed by: ANESTHESIOLOGY

## 2018-12-13 PROCEDURE — 4040F PNEUMOC VAC/ADMIN/RCVD: CPT | Performed by: INTERNAL MEDICINE

## 2018-12-13 PROCEDURE — 99214 OFFICE O/P EST MOD 30 MIN: CPT | Performed by: INTERNAL MEDICINE

## 2018-12-13 PROCEDURE — 64636 DESTROY L/S FACET JNT ADDL: CPT | Performed by: ANESTHESIOLOGY

## 2018-12-13 RX ORDER — METHYLPREDNISOLONE ACETATE 80 MG/ML
80 INJECTION, SUSPENSION INTRA-ARTICULAR; INTRALESIONAL; INTRAMUSCULAR; PARENTERAL; SOFT TISSUE ONCE
Status: COMPLETED | OUTPATIENT
Start: 2018-12-13 | End: 2018-12-13

## 2018-12-13 RX ORDER — 0.9 % SODIUM CHLORIDE 0.9 %
10 VIAL (ML) INJECTION ONCE
Status: COMPLETED | OUTPATIENT
Start: 2018-12-13 | End: 2018-12-13

## 2018-12-13 RX ORDER — BUPIVACAINE HCL/PF 2.5 MG/ML
30 VIAL (ML) INJECTION ONCE
Status: COMPLETED | OUTPATIENT
Start: 2018-12-13 | End: 2018-12-13

## 2018-12-13 RX ADMIN — Medication 9 ML: at 15:34

## 2018-12-13 RX ADMIN — METHYLPREDNISOLONE ACETATE 20 MG: 80 INJECTION, SUSPENSION INTRA-ARTICULAR; INTRALESIONAL; INTRAMUSCULAR; PARENTERAL; SOFT TISSUE at 15:47

## 2018-12-13 RX ADMIN — Medication 3 ML: at 15:47

## 2018-12-13 RX ADMIN — SODIUM CHLORIDE 9 ML: 9 INJECTION, SOLUTION INTRAMUSCULAR; INTRAVENOUS; SUBCUTANEOUS at 15:34

## 2018-12-13 NOTE — H&P
History of Present Illness: The patient is a 71 y o  female who presents with complaints of left lower back pain secondary to lumbar spondylosis and is here today for left L3-5 medial branch radiofrequency ablation      Patient Active Problem List   Diagnosis    Anxiety    Type 2 diabetes mellitus with complication, with long-term current use of insulin (HCC)    Depression    Chronic renal insufficiency    Chest pain on breathing    History of DVT (deep vein thrombosis)    PVC's (premature ventricular contractions)    CAD (coronary artery disease)    Essential hypertension    Hyperlipidemia    Hypothyroid    Fibromyalgia    Spinal stenosis of lumbar region    Adenomatous polyp of colon    Vitreo-retinal adhesions    Vitamin D deficiency    Vitamin B12 deficiency    Ventral hernia    Vascular claudication (HCC)    Thickened endometrium    Spondylosis of lumbar region without myelopathy or radiculopathy    Spondylosis of cervical region without myelopathy or radiculopathy    Palpitations    Onychomycosis    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    Panic attack    Obesity    Myofascial pain syndrome    Retinal edema    Diabetic polyneuropathy associated with type 2 diabetes mellitus (HCC)    SARAY (obstructive sleep apnea)    Allergic rhinitis    Pulmonary nodules    Lumbar spondylosis    Encounter for hepatitis C screening test for low risk patient    Medicare annual wellness visit, subsequent       Past Medical History:   Diagnosis Date    Acute embolism and thrombosis of unspecified deep veins of unspecified lower extremity (HCC)     Last Assessed:  5/18/17    Anemia     Anosmia     Anxiety     Arthritis     Asthma     Back pain     Bilateral macular retinal edema     CAD (coronary artery disease)     Cataract     Cervical disc herniation     Cervical radiculopathy     Cervical spinal stenosis     Cervical spondylolysis  Chronic mastoiditis     Complex endometrial hyperplasia     Depression     Diabetes mellitus (HCC)     DVT (deep venous thrombosis) (HCC)     Fibromyalgia     Hyperlipidemia     Hypertension     Hypothyroid     Lumbar radiculopathy     Obese     Spinal stenosis     Stomach ulcer     Thyroid disease        Past Surgical History:   Procedure Laterality Date    BACK SURGERY      CARPAL TUNNEL RELEASE      CATARACT EXTRACTION      CHOLECYSTECTOMY      COLONOSCOPY      CORONARY ANGIOPLASTY      CORONARY ARTERY BYPASS GRAFT      CYSTOSCOPY N/A 6/20/2017    Procedure: [employer];  Surgeon: Bhaskar Wheeler MD;  [facility];  Service: Gynecology Oncology    [place] LAPAROSCOPY W TOT HYSTERECTUTERUS <=250 Nikki Noun  W TUBE/OVARY N/A 6/20/2017    Procedure: ROBOTIC HYSTERECTOMY; BILATERAL SALPINO-OOPHERECTOMY; umbilical hernia repair ;  Surgeon: Bhaskar Wheeler MD;  [facility];  Service: Gynecology Oncology    TONSILECTOMY AND ADNOIDECTOMY      UMBILICAL HERNIA REPAIR           Current Outpatient Prescriptions:     albuterol (2 5 mg/3 mL) 0 083 % nebulizer solution, Take 3 mL (2 5 mg total) by nebulization every 6 (six) hours as needed for wheezing, Disp: 75 mL, Rfl: 0    albuterol (PROAIR HFA) 90 mcg/act inhaler, Inhale 2 puffs, Disp: , Rfl:     ALPRAZolam (XANAX) 0 5 mg tablet, Take 1 tablet (0 5 mg total) by mouth 3 (three) times a day as needed for anxiety, Disp: 90 tablet, Rfl: 0    aspirin 81 MG tablet, Take 81 mg by mouth daily, Disp: , Rfl:     cholecalciferol (VITAMIN D3) 1,000 units tablet, Take 2,000 Units by mouth daily, Disp: , Rfl:     Co-Enzyme Q-10 100 MG CAPS, Take 1 capsule by mouth 2 (two) times a day  , Disp: , Rfl:     diltiazem (TIAZAC) 180 MG 24 hr capsule, 300 mg  , Disp: , Rfl:     DULoxetine (CYMBALTA) 60 mg delayed release capsule, Take 60 mg by mouth every morning, Disp: , Rfl:     enalapril (VASOTEC) 20 mg tablet, Take 20 mg by mouth daily, Disp: , Rfl:     insulin aspart (NovoLOG) 100 units/mL injection, Inject 12 Units under the skin 3 (three) times a day before meals, Disp: , Rfl: 0    insulin glargine (LANTUS) 100 units/mL subcutaneous injection, Inject 40 Units under the skin 2 (two) times a day, Disp: 10 mL, Rfl: 0    isosorbide mononitrate (IMDUR) 30 mg 24 hr tablet, TAKE 1 TABLET DAILY AT BEDTIME, 30 DAYS, #30, 4 REFILLS, EXTENDED RELEASE , Disp: , Rfl: 6    levothyroxine 50 mcg tablet, Take 50 mcg by mouth daily, Disp: , Rfl:     metFORMIN (GLUCOPHAGE) 500 mg tablet, Take 500 mg by mouth 2 (two) times a day with meals, Disp: , Rfl:     nitroglycerin (NITROLINGUAL) 0 4 mg/spray spray, USE ONE SPRAY Q 5 MINUTES AS NEEDED FOR CHEST PAIN   IF NO RELIEF, CALL 911 , Disp: , Rfl: 1    rosuvastatin (CRESTOR) 20 MG tablet, Take 20 mg by mouth every evening, Disp: , Rfl: 2    tiZANidine (ZANAFLEX) 2 mg tablet, Take 2 mg by mouth daily at bedtime, Disp: , Rfl:     Current Facility-Administered Medications:     bupivacaine (PF) (MARCAINE) 0 25 % injection 30 mL, 30 mL, Perineural, Once, Karla Dotson MD    lidocaine (PF) (XYLOCAINE-MPF) 2 % injection 10 mL, 10 mL, Infiltration, Once, Karla Dotson MD    methylPREDNISolone acetate (DEPO-MEDROL) injection 80 mg, 80 mg, Perineural, Once, Karla Dotson MD    sodium chloride (PF) 0 9 % injection 10 mL, 10 mL, Infiltration, Once, Karla Dotson MD    Allergies   Allergen Reactions    Molds & Smuts     Other      RAGWEED, CAT DANDER, DOG DANDER     Pollen Extract Other (See Comments)     Cold symptoms         Physical Exam:   Vitals:    12/13/18 1521   BP: 146/77   Pulse: 68   Resp: 18   Temp: 98 5 °F (36 9 °C)   SpO2: 98%     General: Awake, Alert, Oriented x 3, Mood and affect appropriate  Respiratory: Respirations even and unlabored  Cardiovascular: Peripheral pulses intact; no edema  Musculoskeletal Exam:   Left lower back tenderness    ASA Score: 2    Patient/Chart Verification  Patient ID Verified: Verbal  Consents Confirmed: Procedural, To be obtained in the Pre-Procedure area  Allergies Reviewed: Yes  Anticoag/NSAID held?: NA  Currently on antibiotics?: No    Assessment:   1   Spondylosis of lumbar region without myelopathy or radiculopathy        Plan: L L3-L5 RFA

## 2018-12-13 NOTE — LETTER
December 13, 2018     Danny Verma  [address]    Patient: Adelita Garcia   YOB: 1945   Date of Visit: 12/13/2018       Dear Dr Altamirano Offer: Thank you for referring Adelita Garcia to me for evaluation  Below are my notes for this consultation  If you have questions, please do not hesitate to call me  I look forward to following your patient along with you  Sincerely,        Nomi Jarvis DO        CC: No Recipients  Nomi Jarvis DO  12/13/2018 10:58 PM  Sign at close encounter  Progress note - Pulmonary Medicine   Adelita Garcia 71 y o  female MRN: [id number]       Impression & Plan:   60-year-old female with past medical history of DVT, diabetes, CAD, anemia, anxiety, cervical radiculopathy, fibromyalgia, and depression who comes in for follow-up of asthma, allergic rhinitis and pulmonary nodules     1   Pulmonary nodules in RML - stable on repeat CT chest for total of 1 year          - Repeat CT for the last time in 6 months which would demonstrate 2 years of stability  No more CT if stable        2   Allergic rhinitis/seasonal allergies-history of anaphylactic shock with cats in the house  Previously doing allergy shots approximately 3 times a week   Due to the inconvenience, she stopped getting them      - She still does not perform the blood work  I am concerned that she will not complete it as she does not want to get rid of her cats        - I asked about breo samples I gave to her and she chose not to use them  She was worried that it would cause more problems so she elected to hold off on it  3   Moderate restrictive lung disease secondary to obesity      -   I explained that she should continue to be active to help her dyspnea         4   Previously diagnosed SARAY-noncompliant with CPAP  Her 2nd sleep study was negative  Currently she denies daytime sleepiness    She is not interested in any additional testing       ______________________________________________________________________    HPI:    Michael Piper presents today for follow-up for CT and asthma  She states that her breathing has been doing well  She does admit to occasional coughing and she does still note some dyspnea on exertion  However, since her last visit, she did not try Breo and did not obtain the blood work to assess for atopic causes of asthma  She did undergo a sleep study which was negative for SARAY  She states that her sleep is fine  Review of Systems:  Review of Systems   Constitutional: Negative for appetite change, fatigue and unexpected weight change  HENT: Negative  Respiratory: Positive for cough  Negative for chest tightness, shortness of breath and wheezing  Cardiovascular: Negative  Gastrointestinal: Negative  Musculoskeletal: Negative  Social history updates:  History   Smoking Status    Former Smoker    Packs/day: 1 00    Years: 6 00    Types: Cigarettes    Quit date: 1984   Smokeless Tobacco    Never Used     Comment: Smoked about a half a pack for about 4 yrs  Quite about 50 yrs ago  Social History     Social History    Marital status: /Civil Union     Spouse name: N/A    Number of children: 2    Years of education: High school or GED     Occupational History    Retired      Social History Main Topics    Smoking status: Former Smoker     Packs/day: 1 00     Years: 6 00     Types: Cigarettes     Quit date: 1984    Smokeless tobacco: Never Used      Comment: Smoked about a half a pack for about 4 yrs  Quite about 50 yrs ago      Alcohol use No    Drug use: No    Sexual activity: No      Comment: No known STD risk factors     Other Topics Concern    Not on file     Social History Narrative    Lives independently with spouse    No known risk factors    Denied:  Exercising regularly       PhysicalExamination:  Vitals:   /70   Pulse 75   Temp 97 7 °F (36 5 °C) Resp 14   Ht 5' 1 5" (1 562 m)   Wt 91 2 kg (201 lb)   SpO2 98%   BMI 37 36 kg/m²    General: pleasant, Awake alert and oriented x 3, conversant without conversational dyspnea, NAD, normal affect  HEENT:  PERRL, Sclera noninjected, nonicteric OU, Nares patent,  no craniofacial abnormalities, Mucous membranes, moist, no oral lesions, normal dentition, Mallampati class 3  NECK: Trachea midline, no accessory muscle use, no stridor, no cervical or supraclavicular adenopathy, JVP not elevated  CARDIAC: Reg, single s1/S2, no m/r/g  PULM: CTA bilaterally no wheezing, rhonchi or rales  ABD: Normoactive bowel sounds, soft nontender, nondistended, no rebound, no rigidity, no guarding  EXT: No cyanosis, no clubbing, no edema, normal capillary refill  NEURO: no focal neurologic deficits, AAOx3, moving all extremities appropriately      Diagnostic Data:  Labs: I personally reviewed the most recent laboratory data pertinent to today's visit  I have personally reviewed pertinent lab results  Lab Results   Component Value Date    WBC 7 85 04/24/2018    HGB 9 9 (L) 04/24/2018    HCT 30 6 (L) 04/24/2018    MCV 82 04/24/2018     04/24/2018     Lab Results   Component Value Date    GLUCOSE 248 (H) 11/09/2015    CALCIUM 9 2 04/24/2018     11/16/2017    K 5 6 (H) 04/24/2018    CO2 22 04/24/2018     04/24/2018    BUN 23 04/24/2018    CREATININE 1 42 (H) 04/24/2018     No results found for: IGE  Lab Results   Component Value Date    ALT 31 04/24/2018    AST 26 04/24/2018    ALKPHOS 129 (H) 04/24/2018    BILITOT 0 5 11/16/2017       PFT results: The most recent pulmonary function tests were reviewed  Sasha Espino  5/22/18  Spirometry:  FEV1/FVC Ratio is 78%   FEV1 is 70% predicted   FVC is 67% predicted     Flow volume loop:  Normal  IMPRESSION:  · Spirometry demonstrates mild reduction in vital capacity which may be on the basis of patient effort, body habitus, restriction or air trapping   Lung volumes would be helpful for further evaluation     In office yen  5/24/17 Spirometry: Forced vital capacity: 1 90GTFn641% Predicted Values  Forced expiratory volume in one second: 1 42AVEz766% Predicted Value  FEV1/FVC ratio is 77%  PFT Interpretation: This study shows a mild reduction in the patient's forced vital capacity  There is no evidence of airflow obstruction     Imaging:  I personally reviewed the images on the [facility] system pertinent to today's visit  CT 05/21/2018-FINDINGS:  LUNGS:  Stable 2 to 3 mm right middle lobe pulmonary nodule is noted series 2 image 28   Stable 6 mm right middle lobe pulmonary nodule is noted series 2 image 30   No new nodules or masses are seen   Minimal linear scarring is again identified within   the left lower lobe      PLEURA:  Unremarkable      HEART/GREAT VESSELS:  Patient is status post coronary artery bypass grafting      MEDIASTINUM AND SAMANTHA:  Unremarkable        CT chest November 2017  FINDINGS:  LUNGS:  Stable 6 mm right middle lobe pulmonary nodule (series 3 image 30)   Adjacent 4 mm right middle lobe nodule (series 3 image 27) there is bibasilar dependent atelectasis   There is linear scarring versus atelectasis at the left lung base   In   retrospect is also stable    PLEURA:  Unremarkable      CT chest June 2017   IMPRESSION:  Stable 6 mm right middle lobe pulmonary nodule   Continued follow-up recommended as described on        Claudeen Berry, DO

## 2018-12-13 NOTE — DISCHARGE INSTR - LAB

## 2018-12-14 NOTE — TELEPHONE ENCOUNTER
I s/w pt who reports a slight bit of soreness but no need to take any pain medication  She denies sunburn sensation to procedure area or fevers  Pt hasn't removed her band aids yet  Pt aware it takes 4-6 wks to see the full effect from the ablation  Pt given 4-6 wk f/u for 1/15/19 at 11:20 w/ HA

## 2018-12-14 NOTE — PROGRESS NOTES
Progress note - Pulmonary Medicine   Brian Vega 71 y o  female MRN: [id number]       Impression & Plan:   80-year-old female with past medical history of DVT, diabetes, CAD, anemia, anxiety, cervical radiculopathy, fibromyalgia, and depression who comes in for follow-up of asthma, allergic rhinitis and pulmonary nodules     1   Pulmonary nodules in RML - stable on repeat CT chest for total of 1 year          - Repeat CT for the last time in 6 months which would demonstrate 2 years of stability  No more CT if stable        2   Allergic rhinitis/seasonal allergies-history of anaphylactic shock with cats in the house  Previously doing allergy shots approximately 3 times a week   Due to the inconvenience, she stopped getting them      - She still does not perform the blood work  I am concerned that she will not complete it as she does not want to get rid of her cats        - I asked about breo samples I gave to her and she chose not to use them  She was worried that it would cause more problems so she elected to hold off on it  3   Moderate restrictive lung disease secondary to obesity      -  I explained that she should continue to be active to help her dyspnea         4   Previously diagnosed SARAY-noncompliant with CPAP  Her 2nd sleep study was negative  Currently she denies daytime sleepiness  She is not interested in any additional testing       ______________________________________________________________________    HPI:    Brian Vega presents today for follow-up for CT and asthma  She states that her breathing has been doing well  She does admit to occasional coughing and she does still note some dyspnea on exertion  However, since her last visit, she did not try Breo and did not obtain the blood work to assess for atopic causes of asthma  She did undergo a sleep study which was negative for SARAY  She states that her sleep is fine        Review of Systems:  Review of Systems Constitutional: Negative for appetite change, fatigue and unexpected weight change  HENT: Negative  Respiratory: Positive for cough  Negative for chest tightness, shortness of breath and wheezing  Cardiovascular: Negative  Gastrointestinal: Negative  Musculoskeletal: Negative  Social history updates:  History   Smoking Status    Former Smoker    Packs/day: 1 00    Years: 6 00    Types: Cigarettes    Quit date: 1984   Smokeless Tobacco    Never Used     Comment: Smoked about a half a pack for about 4 yrs  Quite about 50 yrs ago  Social History     Social History    Marital status: /Civil Union     Spouse name: N/A    Number of children: 2    Years of education: High school or GED     Occupational History    Retired      Social History Main Topics    Smoking status: Former Smoker     Packs/day: 1 00     Years: 6 00     Types: Cigarettes     Quit date: 1984    Smokeless tobacco: Never Used      Comment: Smoked about a half a pack for about 4 yrs  Quite about 50 yrs ago      Alcohol use No    Drug use: No    Sexual activity: No      Comment: No known STD risk factors     Other Topics Concern    Not on file     Social History Narrative    Lives independently with spouse    No known risk factors    Denied:  Exercising regularly       PhysicalExamination:  Vitals:   /70   Pulse 75   Temp 97 7 °F (36 5 °C)   Resp 14   Ht 5' 1 5" (1 562 m)   Wt 91 2 kg (201 lb)   SpO2 98%   BMI 37 36 kg/m²   General: pleasant, Awake alert and oriented x 3, conversant without conversational dyspnea, NAD, normal affect  HEENT:  PERRL, Sclera noninjected, nonicteric OU, Nares patent,  no craniofacial abnormalities, Mucous membranes, moist, no oral lesions, normal dentition, Mallampati class 3  NECK: Trachea midline, no accessory muscle use, no stridor, no cervical or supraclavicular adenopathy, JVP not elevated  CARDIAC: Reg, single s1/S2, no m/r/g  PULM: CTA bilaterally no wheezing, rhonchi or rales  ABD: Normoactive bowel sounds, soft nontender, nondistended, no rebound, no rigidity, no guarding  EXT: No cyanosis, no clubbing, no edema, normal capillary refill  NEURO: no focal neurologic deficits, AAOx3, moving all extremities appropriately      Diagnostic Data:  Labs: I personally reviewed the most recent laboratory data pertinent to today's visit  I have personally reviewed pertinent lab results  Lab Results   Component Value Date    WBC 7 85 04/24/2018    HGB 9 9 (L) 04/24/2018    HCT 30 6 (L) 04/24/2018    MCV 82 04/24/2018     04/24/2018     Lab Results   Component Value Date    GLUCOSE 248 (H) 11/09/2015    CALCIUM 9 2 04/24/2018     11/16/2017    K 5 6 (H) 04/24/2018    CO2 22 04/24/2018     04/24/2018    BUN 23 04/24/2018    CREATININE 1 42 (H) 04/24/2018     No results found for: IGE  Lab Results   Component Value Date    ALT 31 04/24/2018    AST 26 04/24/2018    ALKPHOS 129 (H) 04/24/2018    BILITOT 0 5 11/16/2017       PFT results: The most recent pulmonary function tests were reviewed  Daril Soulier  5/22/18  Spirometry:  FEV1/FVC Ratio is 78%   FEV1 is 70% predicted   FVC is 67% predicted  Flow volume loop:  Normal  IMPRESSION:  · Spirometry demonstrates mild reduction in vital capacity which may be on the basis of patient effort, body habitus, restriction or air trapping   Lung volumes would be helpful for further evaluation     In office yen  5/24/17 Spirometry: Forced vital capacity: 1 89UQNi302% Predicted Values  Forced expiratory volume in one second: 1 47DSZy487% Predicted Value  FEV1/FVC ratio is 77%  PFT Interpretation: This study shows a mild reduction in the patient's forced vital capacity  There is no evidence of airflow obstruction     Imaging:  I personally reviewed the images on the [facility] system pertinent to today's visit  CT 05/21/2018-FINDINGS:  LUNGS:  Stable 2 to 3 mm right middle lobe pulmonary nodule is noted series 2 image 28   Stable 6 mm right middle lobe pulmonary nodule is noted series 2 image 30   No new nodules or masses are seen   Minimal linear scarring is again identified within   the left lower lobe      PLEURA:  Unremarkable      HEART/GREAT VESSELS:  Patient is status post coronary artery bypass grafting      MEDIASTINUM AND SAMANTHA:  Unremarkable        CT chest November 2017  FINDINGS:  LUNGS:  Stable 6 mm right middle lobe pulmonary nodule (series 3 image 30)   Adjacent 4 mm right middle lobe nodule (series 3 image 27) there is bibasilar dependent atelectasis   There is linear scarring versus atelectasis at the left lung base   In   retrospect is also stable    PLEURA:  Unremarkable      CT chest June 2017   IMPRESSION:  Stable 6 mm right middle lobe pulmonary nodule   Continued follow-up recommended as described on        Rodrigue Hackett DO

## 2019-01-07 ENCOUNTER — OFFICE VISIT (OUTPATIENT)
Dept: INTERNAL MEDICINE CLINIC | Facility: CLINIC | Age: 74
End: 2019-01-07
Payer: COMMERCIAL

## 2019-01-07 VITALS
WEIGHT: 197.2 LBS | HEART RATE: 84 BPM | DIASTOLIC BLOOD PRESSURE: 58 MMHG | BODY MASS INDEX: 36.29 KG/M2 | RESPIRATION RATE: 20 BRPM | OXYGEN SATURATION: 96 % | HEIGHT: 62 IN | TEMPERATURE: 97.4 F | SYSTOLIC BLOOD PRESSURE: 136 MMHG

## 2019-01-07 DIAGNOSIS — I10 ESSENTIAL HYPERTENSION: Chronic | ICD-10-CM

## 2019-01-07 DIAGNOSIS — J06.9 ACUTE UPPER RESPIRATORY INFECTION: Primary | ICD-10-CM

## 2019-01-07 DIAGNOSIS — F41.9 ANXIETY: ICD-10-CM

## 2019-01-07 PROCEDURE — 3078F DIAST BP <80 MM HG: CPT | Performed by: NURSE PRACTITIONER

## 2019-01-07 PROCEDURE — 3075F SYST BP GE 130 - 139MM HG: CPT | Performed by: NURSE PRACTITIONER

## 2019-01-07 PROCEDURE — 99214 OFFICE O/P EST MOD 30 MIN: CPT | Performed by: NURSE PRACTITIONER

## 2019-01-07 RX ORDER — AZITHROMYCIN 250 MG/1
TABLET, FILM COATED ORAL
Qty: 6 TABLET | Refills: 0 | Status: SHIPPED | OUTPATIENT
Start: 2019-01-07 | End: 2019-01-12

## 2019-01-07 RX ORDER — DILTIAZEM HYDROCHLORIDE 300 MG/1
CAPSULE, EXTENDED RELEASE ORAL
Refills: 2 | COMMUNITY
Start: 2019-01-05 | End: 2020-06-17

## 2019-01-07 RX ORDER — ALPRAZOLAM 0.5 MG/1
0.5 TABLET ORAL 3 TIMES DAILY PRN
Qty: 90 TABLET | Refills: 0 | Status: SHIPPED | OUTPATIENT
Start: 2019-01-07 | End: 2019-01-31

## 2019-01-07 NOTE — PROGRESS NOTES
Assessment/Plan:    Take antibiotics as prescribed  Coricidin HBP as needed for symptom relief  RTO for worsening symptoms or if not improving in 3-5 days  Diagnoses and all orders for this visit:    Acute upper respiratory infection  -     azithromycin (ZITHROMAX) 250 mg tablet; Take 2 tablets daily on day 1 then 1 tablet daily for the next 4 days    Essential hypertension    Other orders  -     diltiazem (TIAZAC) 300 MG 24 hr capsule; TAKE 1 CAPSULE BY MOUTH EVERY DAY IN THE MORNING          Subjective:      Patient ID: Donald Chávez is a 68 y o  female  Here today for cough and congestion  Started 6 days ago   +sore throat, chest congestion, rhinorrhea, moist cough, low grade fever , poor appetite   Denies any body aches, n/v/d  Taking coricidin HBP         The following portions of the patient's history were reviewed and updated as appropriate: allergies, current medications, past family history, past medical history, past social history, past surgical history and problem list     Review of Systems   Constitutional: Positive for appetite change, fatigue and fever  HENT: Positive for congestion, rhinorrhea and sore throat  Respiratory: Positive for cough  Negative for chest tightness, shortness of breath and wheezing  Cardiovascular: Negative  Gastrointestinal: Negative  Musculoskeletal: Negative  Neurological: Negative  Objective:      /58   Pulse 84   Temp (!) 97 4 °F (36 3 °C) (Tympanic)   Resp 20   Ht 5' 1 5" (1 562 m)   Wt 89 4 kg (197 lb 3 2 oz)   SpO2 96%   BMI 36 66 kg/m²          Physical Exam   Constitutional: She is oriented to person, place, and time  She appears well-developed and well-nourished  HENT:   Right Ear: External ear normal    Left Ear: External ear normal    Mouth/Throat: Oropharynx is clear and moist    Cardiovascular: Normal rate, regular rhythm, normal heart sounds and intact distal pulses      Pulmonary/Chest: Effort normal and breath sounds normal    Lymphadenopathy:     She has no cervical adenopathy  Neurological: She is alert and oriented to person, place, and time  Psychiatric: She has a normal mood and affect  Her behavior is normal    Vitals reviewed

## 2019-01-09 ENCOUNTER — OFFICE VISIT (OUTPATIENT)
Dept: BEHAVIORAL/MENTAL HEALTH CLINIC | Facility: CLINIC | Age: 74
End: 2019-01-09
Payer: COMMERCIAL

## 2019-01-09 DIAGNOSIS — F41.9 ANXIETY: Primary | ICD-10-CM

## 2019-01-09 PROCEDURE — 90834 PSYTX W PT 45 MINUTES: CPT | Performed by: SOCIAL WORKER

## 2019-01-09 NOTE — PSYCH
Assessment/Plan: Manage anxiety     There are no diagnoses linked to this encounter  Subjective: Reports an increase in anxiety with mother's recent health issues and likely placement in long-term care environment  Struggling with guilt about this  Patient ID: William Bhatia is a 68 y o  female  Continues to struggle withy anxiety secondary to multiple family stressors including mother's recent health issues  Review of Systems   Psychiatric/Behavioral: The patient is nervous/anxious  Objective: Romayne Doyne presents as anxious and overwhelmed but is pleasant, verbal, cooperative and well oriented during session  Denies accute depressive symptoms  No SI  Physical Exam   Psychiatric: Her speech is normal and behavior is normal  Judgment and thought content normal  Her mood appears anxious   Cognition and memory are normal    No SI

## 2019-01-09 NOTE — PATIENT INSTRUCTIONS
Richard Rain utilizes session to ventilate regarding stressors- support provided  Reviewed stress mgmt strategies and boundary setting with others to maintain to reduce anxiety  Utilized restructuring to view situation with mother in more accurate terms and being completely outside of her control to lessen feelings of guilt

## 2019-01-15 ENCOUNTER — OFFICE VISIT (OUTPATIENT)
Dept: PAIN MEDICINE | Facility: CLINIC | Age: 74
End: 2019-01-15
Payer: COMMERCIAL

## 2019-01-15 VITALS
HEIGHT: 62 IN | WEIGHT: 200 LBS | RESPIRATION RATE: 20 BRPM | DIASTOLIC BLOOD PRESSURE: 78 MMHG | SYSTOLIC BLOOD PRESSURE: 146 MMHG | TEMPERATURE: 98.4 F | BODY MASS INDEX: 36.8 KG/M2

## 2019-01-15 DIAGNOSIS — M48.061 SPINAL STENOSIS OF LUMBAR REGION, UNSPECIFIED WHETHER NEUROGENIC CLAUDICATION PRESENT: ICD-10-CM

## 2019-01-15 DIAGNOSIS — M47.816 LUMBAR FACET ARTHROPATHY: ICD-10-CM

## 2019-01-15 DIAGNOSIS — M47.816 LUMBAR SPONDYLOSIS: ICD-10-CM

## 2019-01-15 DIAGNOSIS — G89.4 CHRONIC PAIN SYNDROME: Primary | ICD-10-CM

## 2019-01-15 DIAGNOSIS — M79.18 MYOFASCIAL PAIN SYNDROME: ICD-10-CM

## 2019-01-15 PROCEDURE — 99213 OFFICE O/P EST LOW 20 MIN: CPT | Performed by: NURSE PRACTITIONER

## 2019-01-15 NOTE — PROGRESS NOTES
Pt c/o back pain that radiates to the legs    Assessment:  1  Chronic pain syndrome    2  Myofascial pain syndrome    3  Lumbar spondylosis    4  Spinal stenosis of lumbar region, unspecified whether neurogenic claudication present    5  Lumbar facet arthropathy        Plan:  Ellie Tang is a 68 y o  female with a history of chronic pain syndrome secondary to lumbar spondylosis, lumbar disc herniation, lumbar stenosis, lumbar facet arthropathy  The patient continues with ongoing low back pain, which is slightly improved after undergoing bilateral L3-L5  medial branch radiofrequency ablation  She reports that the sharp shooting pain in her back has improved, however she continues with dull aching low back pain  I discussed with the patient that her last left L3-L5 radiofrequency ablation was performed on 12/13/2018, and it can take up to 6-8 weeks to see the full effect of this procedure  I encouraged her give the procedure some more time  She verbalized understanding  I discussed with the patient that her residual low back pain, may be stemming from her ongoing lumbar stenosis  She reports that she underwent multiple steroid injections in the past, without relief of symptoms  She is not interested in proceeding with any additional injections at this time  To help with the inflammatory component of the patient's pain I have prescribed the patient diclofenac cream that she can apply to her back up to 4 times daily  She was instructed to call the office in 1-2 weeks to update office on the effectiveness of this medication, or sooner with adverse medication side effects  The patient will follow up if symptom fail to improve or if symptoms worsen  My impressions and treatment recommendations were discussed in detail with the patient who verbalized understanding and had no further questions  Discharge instructions were provided   I personally saw and examined the patient and I agree with the above discussed plan of care  No orders of the defined types were placed in this encounter  New Medications Ordered This Visit   Medications    diclofenac sodium (VOLTAREN) 1 %     Sig: Apply 2 g topically 4 (four) times a day     Dispense:  1 Tube     Refill:  0       History of Present Illness:  Peterson Morales is a 68 y o  female with a history of chronic pain syndrome secondary to lumbar spondylosis, lumbar disc herniation, lumbar stenosis, lumbar facet arthropathy  The patient was last seen office on 12/13/2018 where she underwent a left L3-L4 5 radiofrequency ablation  She reports that the sharp pain in her back has resolved  She continue with dull aching low back pain that she intermittent in nature  She  presents for a follow up office visit in regards to Back Pain and Leg Pain  The patients current symptoms include low back pain that is localized to her low back  She reports mild bilateral leg weakness, which is unchanged  She denies bowel or bladder issues  She describes her pain as dull aching intermittent pain that occurs mostly during the morning evening hours  The patient reports that her pain is symptoms are unchanged since last office visit  She currently rates her pain as 7/10 numeric pain scale  I have personally reviewed and/or updated the patient's past medical history, past surgical history, family history, social history, current medications, allergies, and vital signs today  Review of Systems   Respiratory: Positive for shortness of breath  Cardiovascular: Positive for chest pain  Gastrointestinal: Negative for constipation, diarrhea, nausea and vomiting  Musculoskeletal: Positive for gait problem  Negative for arthralgias, joint swelling and myalgias  Decreased ROM  Joint stiffness   Skin: Negative for rash  Neurological: Positive for weakness  Negative for dizziness and seizures  All other systems reviewed and are negative        Patient Active Problem List Diagnosis    Anxiety    Type 2 diabetes mellitus with complication, with long-term current use of insulin (HCC)    Depression    Chronic renal insufficiency    Chest pain on breathing    History of DVT (deep vein thrombosis)    PVC's (premature ventricular contractions)    CAD (coronary artery disease)    Essential hypertension    Hyperlipidemia    Hypothyroid    Fibromyalgia    Spinal stenosis of lumbar region    Adenomatous polyp of colon    Vitreo-retinal adhesions    Vitamin D deficiency    Vitamin B12 deficiency    Ventral hernia    Vascular claudication (HCC)    Thickened endometrium    Spondylosis of lumbar region without myelopathy or radiculopathy    Spondylosis of cervical region without myelopathy or radiculopathy    Palpitations    Onychomycosis    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    Panic attack    Obesity    Myofascial pain syndrome    Retinal edema    Diabetic polyneuropathy associated with type 2 diabetes mellitus (HCC)    SARAY (obstructive sleep apnea)    Allergic rhinitis    Pulmonary nodules    Lumbar spondylosis    Encounter for hepatitis C screening test for low risk patient    Medicare annual wellness visit, subsequent    Chronic pain syndrome    Lumbar facet arthropathy       Past Medical History:   Diagnosis Date    Acute embolism and thrombosis of unspecified deep veins of unspecified lower extremity (HCC)     Last Assessed:  5/18/17    Anemia     Anosmia     Anxiety     Arthritis     Asthma     Back pain     Bilateral macular retinal edema     CAD (coronary artery disease)     Cataract     Cervical disc herniation     Cervical radiculopathy     Cervical spinal stenosis     Cervical spondylolysis     Chronic mastoiditis     Complex endometrial hyperplasia     Depression     Diabetes mellitus (HCC)     DVT (deep venous thrombosis) (HCC)     Fibromyalgia     Hyperlipidemia     Hypertension     Hypothyroid     Lumbar radiculopathy     Obese     Spinal stenosis     Stomach ulcer     Thyroid disease        Past Surgical History:   Procedure Laterality Date    BACK SURGERY      CARPAL TUNNEL RELEASE      CATARACT EXTRACTION      CHOLECYSTECTOMY      COLONOSCOPY      CORONARY ANGIOPLASTY      CORONARY ARTERY BYPASS GRAFT      CYSTOSCOPY N/A 6/20/2017    Procedure: Susa Ovens;  Surgeon: Carline Orlando MD;  [facility];  Service: Gynecology Oncology    KS LAPAROSCOPY W TOT HYSTERECTUTERUS <=250 GRAM  W TUBE/OVARY N/A 6/20/2017    Procedure: ROBOTIC HYSTERECTOMY; BILATERAL SALPINO-OOPHERECTOMY; umbilical hernia repair ;  Surgeon: Carline Orlando MD;  Location[facility];  Service: Gynecology Oncology    TONSILECTOMY AND ADNOIDECTOMY      UMBILICAL HERNIA REPAIR         Family History   Problem Relation Age of Onset    Arthritis Mother     Leukemia Mother     Other Mother         Anxiety, major depressive disorder, recurrent episode with atypical features    Coronary artery disease Father         Heart problem    Diabetes Father     Other Father         Infectious disease    Alzheimer's disease Maternal Grandmother     Other Maternal Grandfather         Heart problem    Other Daughter         Anxiety, major depressive disorder, recurrent episode with atypical features    Alcohol abuse Other         Grandparent    Cancer Family     Diabetes Family     Hypertension Family     Other Family         Reported prior back trouble, thyroid disorder       Social History     Occupational History    Retired      Social History Main Topics    Smoking status: Former Smoker     Packs/day: 1 00     Years: 6 00     Types: Cigarettes     Quit date: 1984    Smokeless tobacco: Never Used      Comment: Smoked about a half a pack for about 4 yrs  Quite about 50 yrs ago      Alcohol use No    Drug use: No    Sexual activity: No      Comment: No known STD risk factors Current Outpatient Prescriptions on File Prior to Visit   Medication Sig    albuterol (2 5 mg/3 mL) 0 083 % nebulizer solution Take 3 mL (2 5 mg total) by nebulization every 6 (six) hours as needed for wheezing    albuterol (PROAIR HFA) 90 mcg/act inhaler Inhale 2 puffs    ALPRAZolam (XANAX) 0 5 mg tablet Take 1 tablet (0 5 mg total) by mouth 3 (three) times a day as needed for anxiety    aspirin 81 MG tablet Take 81 mg by mouth daily    cholecalciferol (VITAMIN D3) 1,000 units tablet Take 2,000 Units by mouth daily    Co-Enzyme Q-10 100 MG CAPS Take 1 capsule by mouth 2 (two) times a day      diltiazem (TIAZAC) 300 MG 24 hr capsule TAKE 1 CAPSULE BY MOUTH EVERY DAY IN THE MORNING    DULoxetine (CYMBALTA) 60 mg delayed release capsule Take 60 mg by mouth every morning    enalapril (VASOTEC) 20 mg tablet Take 20 mg by mouth daily    insulin aspart (NovoLOG) 100 units/mL injection Inject 12 Units under the skin 3 (three) times a day before meals    insulin glargine (LANTUS) 100 units/mL subcutaneous injection Inject 40 Units under the skin 2 (two) times a day    isosorbide mononitrate (IMDUR) 30 mg 24 hr tablet TAKE 1 TABLET DAILY AT BEDTIME, 30 DAYS, #30, 4 REFILLS, EXTENDED RELEASE   levothyroxine 50 mcg tablet Take 50 mcg by mouth daily    metFORMIN (GLUCOPHAGE) 500 mg tablet Take 500 mg by mouth 2 (two) times a day with meals    nitroglycerin (NITROLINGUAL) 0 4 mg/spray spray USE ONE SPRAY Q 5 MINUTES AS NEEDED FOR CHEST PAIN  IF NO RELIEF, CALL 911   rosuvastatin (CRESTOR) 20 MG tablet Take 20 mg by mouth every evening    tiZANidine (ZANAFLEX) 2 mg tablet Take 2 mg by mouth daily at bedtime     No current facility-administered medications on file prior to visit          Allergies   Allergen Reactions    Molds & Smuts     Other      RAGWEED, CAT DANDER, DOG DANDER     Pollen Extract Other (See Comments)     Cold symptoms         Physical Exam:    /78   Temp 98 4 °F (36 9 °C) Resp 20   Ht 5' 1 5" (1 562 m)   Wt 90 7 kg (200 lb)   BMI 37 18 kg/m²     Constitutional:normal, well developed, well nourished, alert, in no distress and non-toxic and no overt pain behavior  and obese  Eyes:anicteric  HEENT:grossly intact  Neck:supple, symmetric, trachea midline and no masses   Pulmonary:even and unlabored  Cardiovascular:No edema or pitting edema present  Skin:Normal without rashes or lesions and well hydrated  Psychiatric:Mood and affect appropriate  Neurologic:Cranial Nerves II-XII grossly intact  Musculoskeletal:normal     Lumbar Spine Exam    Appearance:  Normal lordosis  Palpation/Tenderness:  left lumbar paraspinal tenderness  right lumbar paraspinal tenderness  Sensory:  no sensory deficits noted  Range of Motion:  Flexion:  Minimally limited  with pain  Extension:  Minimally limited  with pain  Lateral Flexion - Left:  Minimally limited  with pain  Lateral Flexion - Right:  Minimally limited  with pain  Rotation - Left:  Minimally limited  with pain  Rotation - Right:  Minimally limited  with pain  Motor Strength:  Left hip flexion:  5/5  Right hip flexion:  5/5  Left knee extension:  5/5  Right knee extension:  5/5  Left foot dorsiflexion:  5/5  Left foot plantar flexion:  5/5  Right foot dorsiflexion:  5/5  Right foot plantar flexion:  5/5      Imaging    MRI LUMBAR SPINE WITHOUT CONTRAST     INDICATION:  [id number]: Spinal stenosis, lumbosacral region   History taken directly from the electronic ordering system      COMPARISON:  MRI performed on 6/9/2015     TECHNIQUE:  Sagittal T1, sagittal T2, sagittal inversion recovery, axial T1 and axial T2, coronal T2        IMAGE QUALITY:  Diagnostic     FINDINGS:  Counting reference:  Lumbosacral Junction  For the purposes of this report, L4-5 is considered just above the level of the iliac crest      ALIGNMENT:  The overall alignment appears maintained      MARROW SIGNAL:  Marrow signal is within normal limits without signs of an infiltrative process  No signs of acute fracture or significant marrow edema pattern  Vertebral body heights are maintained  Question sequela prior intervention at left L4-L5      DISTAL CORD AND CONUS:  Normal size and signal within the distal cord and conus  The conus ends at the L1 level      PARASPINAL SOFT TISSUES:  Bilateral renal cysts  Atrophy of the gluteal and paraspinal musculature noted      SACRUM:  Normal signal within the sacrum  No evidence of insufficiency or stress fracture      LOWER THORACIC DISC SPACES:    T12-L1: Right subarticular to foraminal protrusion  Suggestion of mild spinal canal stenosis but no axial images were obtained through this level  Mild right and no significant left neural foraminal stenosis      LUMBAR DISC SPACES:          L1-L2:  No significant spinal canal stenosis  No right and no left neural foraminal stenosis  No significant interval change       L2-L3:  No significant spinal canal stenosis  No right and no left neural foraminal stenosis  No significant interval change       L3-L4:  Circumferential disc bulge with ligamentum flavum thickening facet arthropathy  Mild spinal canal stenosis  No right and no left neural foraminal stenosis  No significant interval change       L4-L5:  Bilateral facet arthropathy  Circumferential disc bulge with ligamentum flavum thickening  Moderate spinal canal stenosis  Mild right and mild left neural foraminal stenosis  No significant interval change       L5-S1:  Interspace narrowing with a superimposed left foraminal protrusion  Bilateral facet arthropathy  No significant spinal canal stenosis  No right and mild left neural foraminal stenosis   No significant interval change       IMPRESSION:  Stable MRI of the lumbar spine when compared to 6/9/2015      Moderate spinal canal stenosis at L4-L5 and mild at L3-L4      Mild degrees of foraminal stenosis as detailed level by level

## 2019-01-16 PROBLEM — M47.816 LUMBAR FACET ARTHROPATHY: Status: ACTIVE | Noted: 2019-01-16

## 2019-01-16 PROBLEM — G89.4 CHRONIC PAIN SYNDROME: Status: ACTIVE | Noted: 2019-01-16

## 2019-01-21 ENCOUNTER — TELEPHONE (OUTPATIENT)
Dept: INTERNAL MEDICINE CLINIC | Facility: CLINIC | Age: 74
End: 2019-01-21

## 2019-01-28 ENCOUNTER — OFFICE VISIT (OUTPATIENT)
Dept: BEHAVIORAL/MENTAL HEALTH CLINIC | Facility: CLINIC | Age: 74
End: 2019-01-28
Payer: COMMERCIAL

## 2019-01-28 DIAGNOSIS — F41.9 ANXIETY: Primary | ICD-10-CM

## 2019-01-28 PROCEDURE — 90834 PSYTX W PT 45 MINUTES: CPT | Performed by: SOCIAL WORKER

## 2019-01-28 PROCEDURE — 3725F SCREEN DEPRESSION PERFORMED: CPT | Performed by: SOCIAL WORKER

## 2019-01-28 NOTE — PSYCH
Assessment/Plan: Manage anxiety     There are no diagnoses linked to this encounter  Subjective: Mayco has lane increasingly anxious and overwhelmed with mother's health issues and subsequent place ment in [facility]  Feels very conflicted on this  Patient ID: Onesimo Russ is a 68 y o  female  Mayco has been dealing with an increase in stress and anxiety that has affected her physcial health issues  Review of Systems   Psychiatric/Behavioral: The patient is nervous/anxious  Objective: Mayco presents as more anxious and overwhelmed  Dealing with her and her 's own health issues in addition to situation with mother  Reinforced need to make her own health needs priority  She presents as verbal, cooperative and well oriented  Physical Exam   Psychiatric: Her speech is normal and behavior is normal  Judgment and thought content normal  Her mood appears anxious   Cognition and memory are normal    No SI

## 2019-01-28 NOTE — PATIENT INSTRUCTIONS
Ellis Camilo utilizes session to ventilate and process stressors and her reaction to same- support provided  Reviewed health coping strategies for stress and anxiety rather than turning to overeating, etc  Given her struggles and complicated medical history, will have consulting psychiatrist review and make any recommendations regarding medications

## 2019-01-31 ENCOUNTER — OFFICE VISIT (OUTPATIENT)
Dept: INTERNAL MEDICINE CLINIC | Facility: CLINIC | Age: 74
End: 2019-01-31
Payer: COMMERCIAL

## 2019-01-31 ENCOUNTER — TELEPHONE (OUTPATIENT)
Dept: INTERNAL MEDICINE CLINIC | Facility: CLINIC | Age: 74
End: 2019-01-31

## 2019-01-31 VITALS
OXYGEN SATURATION: 96 % | RESPIRATION RATE: 16 BRPM | DIASTOLIC BLOOD PRESSURE: 82 MMHG | SYSTOLIC BLOOD PRESSURE: 148 MMHG | BODY MASS INDEX: 36.91 KG/M2 | WEIGHT: 200.6 LBS | HEART RATE: 87 BPM | HEIGHT: 62 IN

## 2019-01-31 DIAGNOSIS — R09.82 POST-NASAL DRIP: ICD-10-CM

## 2019-01-31 DIAGNOSIS — H53.9 VISION CHANGES: ICD-10-CM

## 2019-01-31 DIAGNOSIS — F32.A DEPRESSION, UNSPECIFIED DEPRESSION TYPE: Chronic | ICD-10-CM

## 2019-01-31 DIAGNOSIS — F41.9 ANXIETY: Primary | Chronic | ICD-10-CM

## 2019-01-31 PROCEDURE — 1036F TOBACCO NON-USER: CPT | Performed by: INTERNAL MEDICINE

## 2019-01-31 PROCEDURE — 99214 OFFICE O/P EST MOD 30 MIN: CPT | Performed by: INTERNAL MEDICINE

## 2019-01-31 RX ORDER — BUSPIRONE HYDROCHLORIDE 5 MG/1
5 TABLET ORAL 2 TIMES DAILY PRN
Qty: 60 TABLET | Refills: 1 | Status: SHIPPED | OUTPATIENT
Start: 2019-01-31 | End: 2019-02-28 | Stop reason: SDUPTHER

## 2019-01-31 RX ORDER — FLUTICASONE PROPIONATE 50 MCG
2 SPRAY, SUSPENSION (ML) NASAL DAILY
Qty: 16 G | Refills: 0 | Status: SHIPPED | OUTPATIENT
Start: 2019-01-31 | End: 2020-06-17

## 2019-01-31 NOTE — TELEPHONE ENCOUNTER
----- Message from Michele Lane sent at 1/29/2019  3:05 PM EST -----   Spoke to patient  Appt scheduled for 1/31/19  ----- Message -----  From: Oralia Coates DO  Sent: 1/29/2019   2:37 PM  To: [facility]    The patient is interested in trying this new medication please schedule an office visit with me or the nurse practitioner  To start the new medication   ----- Message -----  From: Fabian Ellis  Sent: 1/29/2019  12:48 PM  To: Oralia Coates DO    Please review- Angela Johnston really struggling with anxiety  [facility]  ----- Message -----  From: Jovon Lambert MD  Sent: 1/29/2019  12:33 PM  To: Fabian Ellis    How about buspirone added to her current Cymbalta? ??  ----- Message -----  From: Fabian Ellis  Sent: 1/28/2019   1:12 PM  To: Jovon Lambert MD    Given her struggles with anxiety and her medical history, would you be able to review for possible medication options/adjustments? In past she was on Zoloft but that was discontinued once she was on Cymbalta  Gabapentin made her sedated   [facility]

## 2019-01-31 NOTE — ASSESSMENT & PLAN NOTE
No suicidal ideation, she reports me the anxiety is secondary to  her mother in  a assisted care facility, no suicidal ideation she has been working with Adrian Jain who is discussed with Psychiatry Dr Rosy Li who is recommending BuSpar 5 mg p o  Bid prn; patient will stop the Xanax, it is not effective    He will continue to  with Counsellor Betty Tejada

## 2019-01-31 NOTE — PROGRESS NOTES
Assessment/Plan:           Problem List Items Addressed This Visit        Other    Anxiety - Primary (Chronic)     No suicidal ideation, she reports me the anxiety is secondary to  her mother in  a assisted care facility, no suicidal ideation she has been working with Britany Carr who is discussed with Psychiatry Dr Sammy Ching who is recommending BuSpar 5 mg p o  Bid prn; patient will stop the Xanax, it is not effective  He will continue to  with Counsellor Iron Filler         Relevant Medications    busPIRone (BUSPAR) 5 mg tablet    Depression (Chronic)     No suicidal ideation she will continue work with [employer]         Relevant Medications    busPIRone (BUSPAR) 5 mg tablet    Post-nasal drip     Following upper respiratory tract infection currently no signs or symptoms of an active infection will treat with Flonase 2 sprays each nostril once a day for next 7-10 days  Relevant Medications    fluticasone (FLONASE) 50 mcg/act nasal spray    Vision changes     I will have the patient see Ophthalmology for eye examination         Relevant Orders    Ambulatory referral to Ophthalmology          Return to office 1  months  call if any problems  Subjective:      Patient ID: Maren Gu is a 68 y o  female  HPI 71-year old female coming in for a follow up office visit regarding anxiety, depression, postnasal drip, vision change; she reports me increasing anxiety no suicidal ideation    Diet is related to her relationship with her mother in the recent placement into the nursing home; mom had fall at home uti was found, [facility] [employer], every time she goes - blames pt, guilt an dmanipulate; still feels guilty- palpitations (she reports me she will follow up with her cardiologist regarding palpitations) , ha anxiety, a1c 9 9 because the stress of all of this   " you put me here"   Indecisiveness,     The following portions of the patient's history were reviewed and updated as appropriate: allergies, current medications, past family history, past medical history, past social history, past surgical history and problem list     Review of Systems   Constitutional: Negative for activity change, appetite change and unexpected weight change  HENT: Positive for congestion and postnasal drip  Eyes: Negative for visual disturbance  Respiratory: Negative for cough and shortness of breath  Cardiovascular: Positive for palpitations  Negative for chest pain  Gastrointestinal: Negative for abdominal pain, diarrhea, nausea and vomiting  Neurological: Negative for dizziness, light-headedness and headaches  Hematological: Negative for adenopathy  Psychiatric/Behavioral: Negative for suicidal ideas  The patient is nervous/anxious  Objective:    No Follow-up on file  No results found        Allergies   Allergen Reactions    Molds & Smuts     Other      RAGWEED, CAT DANDER, DOG DANDER     Pollen Extract Other (See Comments)     Cold symptoms         Past Medical History:   Diagnosis Date    Acute embolism and thrombosis of unspecified deep veins of unspecified lower extremity (HCC)     Last Assessed:  5/18/17    Anemia     Anosmia     Anxiety     Arthritis     Asthma     Back pain     Bilateral macular retinal edema     CAD (coronary artery disease)     Cataract     Cervical disc herniation     Cervical radiculopathy     Cervical spinal stenosis     Cervical spondylolysis     Chronic mastoiditis     Complex endometrial hyperplasia     Depression     Diabetes mellitus ([address] )     DVT (deep venous thrombosis) (HCC)     Fibromyalgia     Hyperlipidemia     Hypertension     Hypothyroid     Lumbar radiculopathy     Obese     Spinal stenosis     Stomach ulcer     Thyroid disease      Past Surgical History:   Procedure Laterality Date    BACK SURGERY      CARPAL TUNNEL RELEASE      CATARACT EXTRACTION  CHOLECYSTECTOMY      COLONOSCOPY      CORONARY ANGIOPLASTY      CORONARY ARTERY BYPASS GRAFT      CYSTOSCOPY N/A 6/20/2017    Procedure: CYSTOSCOPY;  Surgeon: Carline Orlando MD;  [facility];  Service: Gynecology Oncology    [place] LAPAROSCOPY W TOT HYSTERECTUTERUS <=250 GRAM  W TUBE/OVARY N/A 6/20/2017    Procedure: ROBOTIC HYSTERECTOMY; BILATERAL SALPINO-OOPHERECTOMY; umbilical hernia repair ;  Surgeon: Carline Orlando MD;  [facility];  Service: Gynecology Oncology    TONSILECTOMY AND ADNOIDECTOMY      UMBILICAL HERNIA REPAIR       Current Outpatient Prescriptions on File Prior to Visit   Medication Sig Dispense Refill    albuterol (2 5 mg/3 mL) 0 083 % nebulizer solution Take 3 mL (2 5 mg total) by nebulization every 6 (six) hours as needed for wheezing 75 mL 0    albuterol (PROAIR HFA) 90 mcg/act inhaler Inhale 2 puffs      aspirin 81 MG tablet Take 81 mg by mouth daily      cholecalciferol (VITAMIN D3) 1,000 units tablet Take 2,000 Units by mouth daily      Co-Enzyme Q-10 100 MG CAPS Take 1 capsule by mouth 2 (two) times a day        diclofenac sodium (VOLTAREN) 1 % Apply 2 g topically 4 (four) times a day 1 Tube 0    diltiazem (TIAZAC) 300 MG 24 hr capsule TAKE 1 CAPSULE BY MOUTH EVERY DAY IN THE MORNING  2    DULoxetine (CYMBALTA) 60 mg delayed release capsule Take 60 mg by mouth every morning      enalapril (VASOTEC) 20 mg tablet Take 20 mg by mouth daily      insulin aspart (NovoLOG) 100 units/mL injection Inject 12 Units under the skin 3 (three) times a day before meals  0    insulin glargine (LANTUS) 100 units/mL subcutaneous injection Inject 40 Units under the skin 2 (two) times a day 10 mL 0    isosorbide mononitrate (IMDUR) 30 mg 24 hr tablet TAKE 1 TABLET DAILY AT BEDTIME, 30 DAYS, #30, 4 REFILLS, EXTENDED RELEASE   6    levothyroxine 50 mcg tablet Take 50 mcg by mouth daily      metFORMIN (GLUCOPHAGE) 500 mg tablet Take 500 mg by mouth 2 (two) times a day with meals      nitroglycerin (NITROLINGUAL) 0 4 mg/spray spray USE ONE SPRAY Q 5 MINUTES AS NEEDED FOR CHEST PAIN  IF NO RELIEF, CALL 911   1    rosuvastatin (CRESTOR) 20 MG tablet Take 20 mg by mouth every evening  2    tiZANidine (ZANAFLEX) 2 mg tablet Take 2 mg by mouth daily at bedtime      [DISCONTINUED] ALPRAZolam (XANAX) 0 5 mg tablet Take 1 tablet (0 5 mg total) by mouth 3 (three) times a day as needed for anxiety 90 tablet 0     No current facility-administered medications on file prior to visit  Family History   Problem Relation Age of Onset    Arthritis Mother     Leukemia Mother     Other Mother         Anxiety, major depressive disorder, recurrent episode with atypical features    Coronary artery disease Father         Heart problem    Diabetes Father     Other Father         Infectious disease    Alzheimer's disease Maternal Grandmother     Other Maternal Grandfather         Heart problem    Other Daughter         Anxiety, major depressive disorder, recurrent episode with atypical features    Alcohol abuse Other         Grandparent    Cancer Family     Diabetes Family     Hypertension Family     Other Family         Reported prior back trouble, thyroid disorder     Social History     Social History    Marital status: /Civil Union     Spouse name: N/A    Number of children: 2    Years of education: High school or GED     Occupational History    Retired      Social History Main Topics    Smoking status: Former Smoker     Packs/day: 1 00     Years: 6 00     Types: Cigarettes     Quit date: 1984    Smokeless tobacco: Never Used      Comment: Smoked about a half a pack for about 4 yrs  Quite about 50 yrs ago      Alcohol use No    Drug use: No    Sexual activity: No      Comment: No known STD risk factors     Other Topics Concern    Not on file     Social History Narrative    Lives independently with spouse    No known risk factors    Denied:  Exercising regularly     Vitals:    01/31/19 0941   BP: 148/82   BP Location: Left arm   Patient Position: Sitting   Cuff Size: Standard   Pulse: 87   Resp: 16   SpO2: 96%   Weight: 91 kg (200 lb 9 6 oz)   Height: 5' 1 5" (1 562 m)     Results for orders placed or performed in visit on 08/13/18   Hemoglobin A1C   Result Value Ref Range    Hemoglobin A1C 8 8      Weight (last 2 days)     Date/Time   Weight    01/31/19 0941  91 (200 6)            Body mass index is 37 29 kg/m²  BP      Temp      Pulse     Resp      SpO2        Vitals:    01/31/19 0941   Weight: 91 kg (200 lb 9 6 oz)     Vitals:    01/31/19 0941   Weight: 91 kg (200 lb 9 6 oz)       /82 (BP Location: Left arm, Patient Position: Sitting, Cuff Size: Standard)   Pulse 87   Resp 16   Ht 5' 1 5" (1 562 m)   Wt 91 kg (200 lb 9 6 oz)   SpO2 96%   BMI 37 29 kg/m²          Physical Exam   Constitutional: She appears well-developed and well-nourished  HENT:   Head: Normocephalic  Mouth/Throat: Oropharynx is clear and moist    Eyes: Pupils are equal, round, and reactive to light  Conjunctivae are normal  Right eye exhibits no discharge  Left eye exhibits no discharge  No scleral icterus  Neck: Neck supple  Cardiovascular: Normal rate, regular rhythm, normal heart sounds and intact distal pulses  Exam reveals no gallop and no friction rub  No murmur heard  Pulmonary/Chest: Breath sounds normal  No respiratory distress  She has no wheezes  She has no rales  Abdominal: Soft  Bowel sounds are normal  She exhibits no distension and no mass  There is no tenderness  There is no rebound and no guarding  Musculoskeletal: She exhibits no edema or deformity  Lymphadenopathy:     She has no cervical adenopathy  Neurological: She is alert  Coordination normal    Psychiatric: Her mood appears anxious  She exhibits a depressed mood  She expresses no suicidal ideation

## 2019-01-31 NOTE — ASSESSMENT & PLAN NOTE
Following upper respiratory tract infection currently no signs or symptoms of an active infection will treat with Flonase 2 sprays each nostril once a day for next 7-10 days

## 2019-02-11 ENCOUNTER — TELEPHONE (OUTPATIENT)
Dept: INTERNAL MEDICINE CLINIC | Facility: CLINIC | Age: 74
End: 2019-02-11

## 2019-02-11 NOTE — TELEPHONE ENCOUNTER
You had ordered Buspar for the patient  The pharmacy informed the patient that Buspar is no longer being manufactured  The pharmacist told her there is a generic for Buspar  The patient would like to know if you wanted to replace the Buspar with something else  Please advise   Thank you

## 2019-02-12 NOTE — TELEPHONE ENCOUNTER
Spoke to [facility]  The Buspirone is on backorder since June 2018  They do get it in intermittently but they are never sure of the dose or when  They do have Buspirone 7 5 mg in stock but that is the only dose  This is not the case for all pharmacies, they all may have different doses in stock  The patient states she will call around to see where her dose is available and let us know so we can send in for her

## 2019-02-22 RX ORDER — INSULIN ASPART 100 [IU]/ML
INJECTION, SOLUTION INTRAVENOUS; SUBCUTANEOUS
COMMUNITY
Start: 2018-12-29 | End: 2019-06-24

## 2019-02-28 ENCOUNTER — OFFICE VISIT (OUTPATIENT)
Dept: INTERNAL MEDICINE CLINIC | Facility: CLINIC | Age: 74
End: 2019-02-28
Payer: COMMERCIAL

## 2019-02-28 VITALS
RESPIRATION RATE: 16 BRPM | BODY MASS INDEX: 37.65 KG/M2 | OXYGEN SATURATION: 98 % | WEIGHT: 204.6 LBS | DIASTOLIC BLOOD PRESSURE: 64 MMHG | SYSTOLIC BLOOD PRESSURE: 156 MMHG | HEART RATE: 80 BPM | TEMPERATURE: 98.3 F | HEIGHT: 62 IN

## 2019-02-28 DIAGNOSIS — E03.9 HYPOTHYROIDISM, UNSPECIFIED TYPE: Primary | Chronic | ICD-10-CM

## 2019-02-28 DIAGNOSIS — Z79.4 TYPE 2 DIABETES MELLITUS WITH COMPLICATION, WITH LONG-TERM CURRENT USE OF INSULIN (HCC): Chronic | ICD-10-CM

## 2019-02-28 DIAGNOSIS — F41.9 ANXIETY: ICD-10-CM

## 2019-02-28 DIAGNOSIS — E78.5 HYPERLIPIDEMIA, UNSPECIFIED HYPERLIPIDEMIA TYPE: ICD-10-CM

## 2019-02-28 DIAGNOSIS — E11.8 TYPE 2 DIABETES MELLITUS WITH COMPLICATION, WITH LONG-TERM CURRENT USE OF INSULIN (HCC): Chronic | ICD-10-CM

## 2019-02-28 DIAGNOSIS — F32.A DEPRESSION, UNSPECIFIED DEPRESSION TYPE: ICD-10-CM

## 2019-02-28 DIAGNOSIS — F41.9 ANXIETY: Chronic | ICD-10-CM

## 2019-02-28 PROCEDURE — 3008F BODY MASS INDEX DOCD: CPT | Performed by: INTERNAL MEDICINE

## 2019-02-28 PROCEDURE — 1160F RVW MEDS BY RX/DR IN RCRD: CPT | Performed by: INTERNAL MEDICINE

## 2019-02-28 PROCEDURE — 99214 OFFICE O/P EST MOD 30 MIN: CPT | Performed by: INTERNAL MEDICINE

## 2019-02-28 RX ORDER — BUSPIRONE HYDROCHLORIDE 10 MG/1
TABLET ORAL
Qty: 15 TABLET | Refills: 1 | Status: SHIPPED | OUTPATIENT
Start: 2019-02-28 | End: 2019-06-06 | Stop reason: SINTOL

## 2019-02-28 RX ORDER — ALPRAZOLAM 0.5 MG/1
0.5 TABLET ORAL 2 TIMES DAILY PRN
Qty: 60 TABLET | Refills: 0 | Status: SHIPPED | OUTPATIENT
Start: 2019-02-28 | End: 2019-04-01 | Stop reason: SDUPTHER
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
## 2019-03-04 NOTE — ASSESSMENT & PLAN NOTE
Lab Results   Component Value Date    HGBA1C 8 8 08/13/2018       No results for input(s): POCGLU in the last 72 hours  Blood Sugar Average: Last 72 hrs:   suboptimal control I have counselled the pt to follow a healthy and balanced diet ,and recommend routine exercise  I will be ordering diabetic laboratories including comprehensive metabolic panel, hemoglobin A1c, urine microalbumin, lipid panel    Follow up with Endocrinology for adjustment medications

## 2019-03-04 NOTE — ASSESSMENT & PLAN NOTE
Patient reports me secondary to problems nursing and also relationship with her; patient is moving with no suicidal she does request refill of Xanax a use far she is unable to get a previous prescription  She is not to take the Xanax with the BuSpar

## 2019-03-27 ENCOUNTER — OFFICE VISIT (OUTPATIENT)
Dept: BEHAVIORAL/MENTAL HEALTH CLINIC | Facility: CLINIC | Age: 74
End: 2019-03-27
Payer: COMMERCIAL

## 2019-03-27 DIAGNOSIS — F41.9 ANXIETY: Primary | ICD-10-CM

## 2019-03-27 PROCEDURE — 90834 PSYTX W PT 45 MINUTES: CPT | Performed by: SOCIAL WORKER

## 2019-03-27 NOTE — PATIENT INSTRUCTIONS
Reviewed boundaries/limits to maintain with mother as well as with  at home to limit exposure to additional stress  Reviewed stress mgmt strategies to utilize on consistent basis  Have encouraged her to develop her own social supports and activities separate from  now that he has become more active outside of home  Will meet on a PRN basis

## 2019-03-27 NOTE — PSYCH
Assessment/Plan: Manage anxiety     There are no diagnoses linked to this encounter  Subjective: Continued to report anxiety although this has lessened from last week  Buspar had side effects and she also realized she ran out of her Cymbalta  Cymbalta was more helpful managing mood issues than she realized  Continues to deal with consistent stress related to her mother,  and granddaughter but seems to be managing  Patient ID: Onesimo Russ is a 68 y o  female  Met with Mayco for 50 minutes from 9:50AM-10:40AM  Continues to struggle with stress and anxiety from multiple stressors  Has been more assertive with mother and needs to do so with all family members to reduce exposure to additional stress  Denies any acute depressive symptoms  Review of Systems   Psychiatric/Behavioral: The patient is nervous/anxious  Objective: Mayco presents as anxious but not to larger degree than in previous sessions  She is verbal, cooperative and well oriented during session       Physical Exam   Psychiatric: Her behavior is normal  Judgment and thought content normal

## 2019-04-01 DIAGNOSIS — F41.9 ANXIETY: ICD-10-CM

## 2019-04-02 RX ORDER — ALPRAZOLAM 0.5 MG/1
0.5 TABLET ORAL 2 TIMES DAILY PRN
Qty: 60 TABLET | Refills: 1 | Status: SHIPPED | OUTPATIENT
Start: 2019-04-02 | End: 2019-05-28 | Stop reason: SDUPTHER

## 2019-04-05 ENCOUNTER — OFFICE VISIT (OUTPATIENT)
Dept: INTERNAL MEDICINE CLINIC | Facility: CLINIC | Age: 74
End: 2019-04-05
Payer: COMMERCIAL

## 2019-04-05 VITALS
OXYGEN SATURATION: 98 % | RESPIRATION RATE: 16 BRPM | SYSTOLIC BLOOD PRESSURE: 142 MMHG | BODY MASS INDEX: 37.5 KG/M2 | DIASTOLIC BLOOD PRESSURE: 72 MMHG | WEIGHT: 203.8 LBS | HEART RATE: 68 BPM | HEIGHT: 62 IN

## 2019-04-05 DIAGNOSIS — Z79.4 TYPE 2 DIABETES MELLITUS WITH COMPLICATION, WITH LONG-TERM CURRENT USE OF INSULIN (HCC): ICD-10-CM

## 2019-04-05 DIAGNOSIS — F41.9 ANXIETY: ICD-10-CM

## 2019-04-05 DIAGNOSIS — E11.8 TYPE 2 DIABETES MELLITUS WITH COMPLICATION, WITH LONG-TERM CURRENT USE OF INSULIN (HCC): ICD-10-CM

## 2019-04-05 DIAGNOSIS — E78.5 HYPERLIPIDEMIA, UNSPECIFIED HYPERLIPIDEMIA TYPE: ICD-10-CM

## 2019-04-05 DIAGNOSIS — Z11.59 ENCOUNTER FOR HEPATITIS C SCREENING TEST FOR LOW RISK PATIENT: ICD-10-CM

## 2019-04-05 DIAGNOSIS — E66.9 CLASS 2 OBESITY WITH BODY MASS INDEX (BMI) OF 39.0 TO 39.9 IN ADULT, UNSPECIFIED OBESITY TYPE, UNSPECIFIED WHETHER SERIOUS COMORBIDITY PRESENT: Primary | ICD-10-CM

## 2019-04-05 PROCEDURE — 99214 OFFICE O/P EST MOD 30 MIN: CPT | Performed by: INTERNAL MEDICINE

## 2019-04-05 PROCEDURE — 1160F RVW MEDS BY RX/DR IN RCRD: CPT | Performed by: INTERNAL MEDICINE

## 2019-04-05 PROCEDURE — 3008F BODY MASS INDEX DOCD: CPT | Performed by: INTERNAL MEDICINE

## 2019-04-05 PROCEDURE — 1036F TOBACCO NON-USER: CPT | Performed by: INTERNAL MEDICINE

## 2019-04-12 LAB
LEFT EYE DIABETIC RETINOPATHY: NORMAL
RIGHT EYE DIABETIC RETINOPATHY: NORMAL

## 2019-04-12 PROCEDURE — 3072F LOW RISK FOR RETINOPATHY: CPT | Performed by: NURSE PRACTITIONER

## 2019-04-23 LAB
LEFT EYE DIABETIC RETINOPATHY: NORMAL
RIGHT EYE DIABETIC RETINOPATHY: NORMAL

## 2019-04-23 PROCEDURE — 2022F DILAT RTA XM EVC RTNOPTHY: CPT | Performed by: NURSE PRACTITIONER

## 2019-05-13 ENCOUNTER — HOSPITAL ENCOUNTER (OUTPATIENT)
Dept: CT IMAGING | Facility: HOSPITAL | Age: 74
Discharge: HOME | End: 2019-05-13
Attending: INTERNAL MEDICINE
Payer: COMMERCIAL

## 2019-05-13 DIAGNOSIS — J45.909 UNCOMPLICATED ASTHMA, UNSPECIFIED ASTHMA SEVERITY, UNSPECIFIED WHETHER PERSISTENT: ICD-10-CM

## 2019-05-13 DIAGNOSIS — R91.1 PULMONARY NODULE: ICD-10-CM

## 2019-05-13 PROCEDURE — 71250 CT THORAX DX C-: CPT

## 2019-05-15 ENCOUNTER — TELEPHONE (OUTPATIENT)
Dept: PULMONOLOGY | Facility: CLINIC | Age: 74
End: 2019-05-15

## 2019-05-24 ENCOUNTER — OFFICE VISIT (OUTPATIENT)
Dept: PODIATRY | Facility: CLINIC | Age: 74
End: 2019-05-24
Payer: COMMERCIAL

## 2019-05-24 VITALS
WEIGHT: 203 LBS | HEIGHT: 62 IN | DIASTOLIC BLOOD PRESSURE: 72 MMHG | BODY MASS INDEX: 37.36 KG/M2 | HEART RATE: 68 BPM | SYSTOLIC BLOOD PRESSURE: 142 MMHG

## 2019-05-24 DIAGNOSIS — E11.42 DIABETIC POLYNEUROPATHY ASSOCIATED WITH TYPE 2 DIABETES MELLITUS (HCC): Primary | ICD-10-CM

## 2019-05-24 DIAGNOSIS — L85.1 ACQUIRED KERATODERMA: ICD-10-CM

## 2019-05-24 DIAGNOSIS — B35.1 ONYCHOMYCOSIS: ICD-10-CM

## 2019-05-24 PROCEDURE — 11056 PARNG/CUTG B9 HYPRKR LES 2-4: CPT | Performed by: PODIATRIST

## 2019-05-24 PROCEDURE — 11721 DEBRIDE NAIL 6 OR MORE: CPT | Performed by: PODIATRIST

## 2019-05-28 DIAGNOSIS — F41.9 ANXIETY: ICD-10-CM

## 2019-05-30 RX ORDER — ALPRAZOLAM 0.5 MG/1
0.5 TABLET ORAL 2 TIMES DAILY PRN
Qty: 60 TABLET | Refills: 1 | Status: SHIPPED | OUTPATIENT
Start: 2019-05-30 | End: 2019-08-02 | Stop reason: SDUPTHER

## 2019-06-06 ENCOUNTER — OFFICE VISIT (OUTPATIENT)
Dept: INTERNAL MEDICINE CLINIC | Facility: CLINIC | Age: 74
End: 2019-06-06
Payer: COMMERCIAL

## 2019-06-06 VITALS
OXYGEN SATURATION: 97 % | SYSTOLIC BLOOD PRESSURE: 136 MMHG | DIASTOLIC BLOOD PRESSURE: 62 MMHG | HEART RATE: 77 BPM | WEIGHT: 196.8 LBS | BODY MASS INDEX: 36.58 KG/M2 | TEMPERATURE: 98 F | RESPIRATION RATE: 18 BRPM

## 2019-06-06 DIAGNOSIS — Z12.39 ENCOUNTER FOR SCREENING FOR MALIGNANT NEOPLASM OF BREAST: ICD-10-CM

## 2019-06-06 DIAGNOSIS — J06.9 UPPER RESPIRATORY TRACT INFECTION, UNSPECIFIED TYPE: Primary | ICD-10-CM

## 2019-06-06 DIAGNOSIS — Z79.4 TYPE 2 DIABETES MELLITUS WITH COMPLICATION, WITH LONG-TERM CURRENT USE OF INSULIN (HCC): Chronic | ICD-10-CM

## 2019-06-06 DIAGNOSIS — Z79.899 ENCOUNTER FOR LONG-TERM (CURRENT) USE OF MEDICATIONS: ICD-10-CM

## 2019-06-06 DIAGNOSIS — E11.8 TYPE 2 DIABETES MELLITUS WITH COMPLICATION, WITH LONG-TERM CURRENT USE OF INSULIN (HCC): Chronic | ICD-10-CM

## 2019-06-06 PROCEDURE — 99213 OFFICE O/P EST LOW 20 MIN: CPT | Performed by: NURSE PRACTITIONER

## 2019-06-06 PROCEDURE — 1160F RVW MEDS BY RX/DR IN RCRD: CPT | Performed by: NURSE PRACTITIONER

## 2019-06-06 PROCEDURE — 83036 HEMOGLOBIN GLYCOSYLATED A1C: CPT | Performed by: NURSE PRACTITIONER

## 2019-06-06 RX ORDER — AZITHROMYCIN 250 MG/1
TABLET, FILM COATED ORAL
Qty: 6 TABLET | Refills: 0 | Status: SHIPPED | OUTPATIENT
Start: 2019-06-06 | End: 2019-06-10

## 2019-06-09 PROBLEM — J06.9 UPPER RESPIRATORY TRACT INFECTION: Status: ACTIVE | Noted: 2019-06-09

## 2019-06-10 LAB
1OH-MIDAZOLAM UR-MCNC: NEGATIVE NG/ML
6MAM UR QL: NEGATIVE NG/ML
A-OH ALPRAZ UR-MCNC: 409 NG/ML
A-OH-TRIAZOLAM UR-MCNC: NEGATIVE NG/ML
AMPHETAMINES UR QL: NEGATIVE NG/ML
BARBITURATES UR QL: NEGATIVE NG/ML
BENZODIAZ UR QL: POSITIVE NG/ML
BZE UR QL: NEGATIVE NG/ML
CREAT UR-MCNC: 167.5 MG/DL
ETHANOL UR QL: NEGATIVE NG/ML
LORAZEPAM UR-MCNC: NEGATIVE NG/ML
METHADONE UR QL: NEGATIVE NG/ML
NORDIAZEPAM UR-MCNC: NEGATIVE NG/ML
OPIATES UR QL: NEGATIVE NG/ML
OXAZEPAM UR-MCNC: NEGATIVE NG/ML
OXIDANTS UR QL: NEGATIVE MCG/ML
OXYCODONE UR QL: NEGATIVE NG/ML
PCP UR QL: NEGATIVE NG/ML
PH UR: 6.11 [PH] (ref 4.5–9)
SL AMB AMINOCLONAZEPAM: NEGATIVE NG/ML
SL AMB HYDROXYETHYLFLURAZEPAM: NEGATIVE NG/ML
TEMAZEPAM UR-MCNC: NEGATIVE NG/ML
THC UR QL: NEGATIVE NG/ML

## 2019-06-24 ENCOUNTER — APPOINTMENT (EMERGENCY)
Dept: CT IMAGING | Facility: HOSPITAL | Age: 74
End: 2019-06-24
Payer: COMMERCIAL

## 2019-06-24 ENCOUNTER — HOSPITAL ENCOUNTER (EMERGENCY)
Facility: HOSPITAL | Age: 74
Discharge: HOME | End: 2019-06-24
Attending: EMERGENCY MEDICINE | Admitting: EMERGENCY MEDICINE
Payer: COMMERCIAL

## 2019-06-24 VITALS
WEIGHT: 197.31 LBS | TEMPERATURE: 97.6 F | HEIGHT: 62 IN | RESPIRATION RATE: 16 BRPM | SYSTOLIC BLOOD PRESSURE: 165 MMHG | DIASTOLIC BLOOD PRESSURE: 72 MMHG | BODY MASS INDEX: 36.31 KG/M2 | HEART RATE: 66 BPM | OXYGEN SATURATION: 96 %

## 2019-06-24 DIAGNOSIS — S02.30XA ORBITAL FLOOR FRACTURE (HCC): ICD-10-CM

## 2019-06-24 DIAGNOSIS — S00.83XA CONTUSION OF FACE, INITIAL ENCOUNTER: ICD-10-CM

## 2019-06-24 DIAGNOSIS — S02.2XXA CLOSED FRACTURE OF NASAL BONE, INITIAL ENCOUNTER: Primary | ICD-10-CM

## 2019-06-24 PROCEDURE — 99284 EMERGENCY DEPT VISIT MOD MDM: CPT | Performed by: EMERGENCY MEDICINE

## 2019-06-24 PROCEDURE — 70450 CT HEAD/BRAIN W/O DYE: CPT

## 2019-06-24 PROCEDURE — 72125 CT NECK SPINE W/O DYE: CPT

## 2019-06-24 PROCEDURE — 70486 CT MAXILLOFACIAL W/O DYE: CPT

## 2019-06-24 PROCEDURE — 99284 EMERGENCY DEPT VISIT MOD MDM: CPT

## 2019-06-24 RX ORDER — AMOXICILLIN AND CLAVULANATE POTASSIUM 875; 125 MG/1; MG/1
1 TABLET, FILM COATED ORAL EVERY 12 HOURS
Qty: 20 TABLET | Refills: 0 | Status: SHIPPED | OUTPATIENT
Start: 2019-06-24 | End: 2019-07-04

## 2019-06-24 RX ORDER — AMOXICILLIN AND CLAVULANATE POTASSIUM 875; 125 MG/1; MG/1
1 TABLET, FILM COATED ORAL ONCE
Status: COMPLETED | OUTPATIENT
Start: 2019-06-24 | End: 2019-06-24

## 2019-06-24 RX ORDER — INSULIN GLARGINE 100 [IU]/ML
26 INJECTION, SOLUTION SUBCUTANEOUS
COMMUNITY
End: 2019-10-18

## 2019-06-24 RX ADMIN — AMOXICILLIN AND CLAVULANATE POTASSIUM 1 TABLET: 875; 125 TABLET, FILM COATED ORAL at 13:19

## 2019-06-25 ENCOUNTER — VBI (OUTPATIENT)
Dept: ADMINISTRATIVE | Facility: OTHER | Age: 74
End: 2019-06-25

## 2019-06-26 LAB
LEFT EYE DIABETIC RETINOPATHY: NORMAL
RIGHT EYE DIABETIC RETINOPATHY: NORMAL

## 2019-07-11 ENCOUNTER — OFFICE VISIT (OUTPATIENT)
Dept: PULMONOLOGY | Facility: CLINIC | Age: 74
End: 2019-07-11
Payer: COMMERCIAL

## 2019-07-11 VITALS
SYSTOLIC BLOOD PRESSURE: 124 MMHG | HEIGHT: 62 IN | DIASTOLIC BLOOD PRESSURE: 80 MMHG | RESPIRATION RATE: 14 BRPM | WEIGHT: 197 LBS | BODY MASS INDEX: 36.25 KG/M2 | TEMPERATURE: 97.7 F | HEART RATE: 70 BPM | OXYGEN SATURATION: 99 %

## 2019-07-11 DIAGNOSIS — R40.0 UNCONTROLLED DAYTIME SOMNOLENCE: ICD-10-CM

## 2019-07-11 DIAGNOSIS — G47.33 OSA (OBSTRUCTIVE SLEEP APNEA): Primary | ICD-10-CM

## 2019-07-11 DIAGNOSIS — R91.8 PULMONARY NODULES: ICD-10-CM

## 2019-07-11 DIAGNOSIS — Z86.718 HISTORY OF DVT (DEEP VEIN THROMBOSIS): Chronic | ICD-10-CM

## 2019-07-11 PROCEDURE — 99214 OFFICE O/P EST MOD 30 MIN: CPT | Performed by: INTERNAL MEDICINE

## 2019-07-11 NOTE — LETTER
July 13, 2019     Dominik Francoisr  [address] [facility] [address]    Patient: Pia Aguilar   YOB: 1945   Date of Visit: 7/11/2019       Dear Dr Kelly Avila: Thank you for referring Pia Aguilar to me for evaluation  Below are my notes for this consultation  If you have questions, please do not hesitate to call me  I look forward to following your patient along with you  Sincerely,        Maricarmen Gaines DO        CC: MD Maricarmen Chandler DO  7/13/2019  9:12 AM  Sign at close encounter  Progress note - Pulmonary Medicine   Pia Aguilar 68 y o  female MRN: [id number]       Impression & Plan:   70-year-old female with past medical history of DVT, diabetes, CAD, anemia, anxiety, cervical radiculopathy, fibromyalgia, and depression who comes in for follow-up of asthma, allergic rhinitis and pulmonary nodules     1  Somnolence -  I am greatly concerned for her somnolence and frequent falls  She is on Xanax as a medication which I do not think is a good choice for her given her age, co morbidities (including untreated SARAY) and falls of recent  I am also concerned given untreated sleep apnea these medications will further cause respiratory suppression and may even be causing CO2 retention  While I understand she claims severe anxiety and panic, I believe that there are several other choices that may help and I recommend she follow with a psychiatrist for that  -  I recommended an ABG but the patient refuses because it hurts  -  I explained to her in depth my concern for possible respiratory failure that can be impending         -  She is willing to restart therapy for SARAY  I will send for a PSG as well  She has supposed claustrophobia but I explained to her that it can be managed with her SARAY    She would need to either choose anxiety medications that dont effect respiratory drive or NIV treatment with those medications  2    Pulmonary nodules in RML - stable over 2 years  Likely benign  No additional testing        3   Allergic rhinitis/seasonal allergies-history of anaphylactic shock with cats previously in the house   Previously doing allergy shots approximately 3 times a week       - She still has not performed the blood work  I am concerned bout the sedation, CO2 issues causing forgetfulness and poor judgement        - She is not using breo even though I recommended and gave her samples previously        4   Moderate restrictive lung disease secondary to obesity- encouraged weight loss        ______________________________________________________________________    HPI:    Marino Robles presents today for follow-up for her CT of the chest   CT chest was stable and she states that her breathing has been fine  However, she is very somnolent today  She talked about recent falls that she has been having due to what she dubs as "clumsiness"  She continues to use xanax for anxiety  She denies chest tightness, wheezing or cough  Review of Systems:  Review of Systems   Constitutional: Negative  HENT: Negative  Eyes: Negative  Respiratory: Negative  Cardiovascular: Negative  Genitourinary: Negative  Musculoskeletal: Negative  Neurological: Positive for dizziness and syncope  Negative for weakness and numbness  Hematological: Negative  Psychiatric/Behavioral: Positive for dysphoric mood and sleep disturbance  Negative for behavioral problems  Social history updates:  Social History     Tobacco Use   Smoking Status Former Smoker    Packs/day: 1 00    Years: 6 00    Pack years: 6 00    Types: Cigarettes    Last attempt to quit:     Years since quittin 5   Smokeless Tobacco Never Used   Tobacco Comment    Smoked about a half a pack for about 4 yrs  Quite about 50 yrs ago       Social History     Socioeconomic History    Marital status: /Civil Union Spouse name: Not on file    Number of children: 2    Years of education: High school or GED    Highest education level: Not on file   Occupational History    Occupation: Retired   Social Needs    Financial resource strain: Not on file    Food insecurity:     Worry: Not on file     Inability: Not on file   [employer] needs:     Medical: Not on file     Non-medical: Not on file   Tobacco Use    Smoking status: Former Smoker     Packs/day: 1 00     Years: 6 00     Pack years: 6 00     Types: Cigarettes     Last attempt to quit:      Years since quittin 5    Smokeless tobacco: Never Used    Tobacco comment: Smoked about a half a pack for about 4 yrs  Quite about 50 yrs ago     Substance and Sexual Activity    Alcohol use: No    Drug use: No    Sexual activity: Never     Comment: No known STD risk factors   Lifestyle    Physical activity:     Days per week: Not on file     Minutes per session: Not on file    Stress: Not on file   Relationships    Social connections:     Talks on phone: Not on file     Gets together: Not on file     Attends Adventism service: Not on file     Active member of club or organization: Not on file     Attends meetings of clubs or organizations: Not on file     Relationship status: Not on file    Intimate partner violence:     Fear of current or ex partner: Not on file     Emotionally abused: Not on file     Physically abused: Not on file     Forced sexual activity: Not on file   Other Topics Concern    Not on file   Social History Narrative    Lives independently with spouse    No known risk factors    Denied:  Exercising regularly       PhysicalExamination:  Vitals:   /80   Pulse 70   Temp 97 7 °F (36 5 °C)   Resp 14   Ht 5' 1 5" (1 562 m)   Wt 89 4 kg (197 lb)   SpO2 99%   BMI 36 62 kg/m²    Physical Exam  General: Somnolent with conversation, Awake alert and oriented x 3, conversant without conversational dyspnea, NAD, normal affect  HEENT: PERRL, Sclera noninjected, nonicteric OU, Nares patent,  no craniofacial abnormalities, Mucous membranes, moist, no oral lesions, normal dentition  NECK: Trachea midline, no accessory muscle use, no stridor, no cervical or supraclavicular adenopathy, JVP not elevated  CARDIAC: Reg, single s1/S2, no m/r/g  PULM: Decreased breath sounds and wheezing  ABD: Normoactive bowel sounds, soft nontender, nondistended, no rebound, no rigidity, no guarding  EXT: No cyanosis, no clubbing, no edema, normal capillary refill  NEURO: no focal neurologic deficits, AAOx3, moving all extremities appropriately    Diagnostic Data:  Labs: I personally reviewed the most recent laboratory data pertinent to today's visit  I have personally reviewed pertinent lab results  Lab Results   Component Value Date    WBC 7 85 04/24/2018    HGB 9 9 (L) 04/24/2018    HCT 30 6 (L) 04/24/2018    MCV 82 04/24/2018     04/24/2018     Lab Results   Component Value Date    GLUCOSE 248 (H) 11/09/2015    CALCIUM 9 2 04/24/2018     11/16/2017    K 5 6 (H) 04/24/2018    CO2 22 04/24/2018     04/24/2018    BUN 23 04/24/2018    CREATININE 1 42 (H) 04/24/2018     No results found for: IGE  Lab Results   Component Value Date    ALT 31 04/24/2018    AST 26 04/24/2018    ALKPHOS 129 (H) 04/24/2018    BILITOT 0 5 11/16/2017     PFT results: The most recent pulmonary function tests were reviewed  David Pulido  5/22/18  Spirometry:  FEV1/FVC Ratio is 78%   FEV1 is 70% predicted   FVC is 67% predicted  Flow volume loop:  Normal  IMPRESSION:  · Spirometry demonstrates mild reduction in vital capacity which may be on the basis of patient effort, body habitus, restriction or air trapping   Lung volumes would be helpful for further evaluation     In office yen  5/24/17 Spirometry: Forced vital capacity: 1 [id number]% Predicted Values  Forced expiratory volume in one second: 1 40EFSk487% Predicted Value  FEV1/FVC ratio is 77%     PFT Interpretation: This study shows a mild reduction in the patient's forced vital capacity  There is no evidence of airflow obstruction     Imaging:  I personally reviewed the images on the [facility] system pertinent to today's visit  CT 05/21/2018-FINDINGS:  LUNGS:  Stable 2 to 3 mm right middle lobe pulmonary nodule is noted series 2 image 28   Stable 6 mm right middle lobe pulmonary nodule is noted series 2 image 30   No new nodules or masses are seen   Minimal linear scarring is again identified within   the left lower lobe      PLEURA:  Unremarkable      HEART/GREAT VESSELS:  Patient is status post coronary artery bypass grafting      MEDIASTINUM AND SAMANTHA:  Unremarkable        CT chest November 2017  FINDINGS:  LUNGS:  Stable 6 mm right middle lobe pulmonary nodule (series 3 image 30)   Adjacent 4 mm right middle lobe nodule (series 3 image 27) there is bibasilar dependent atelectasis   There is linear scarring versus atelectasis at the left lung base   In   retrospect is also stable    PLEURA:  Unremarkable      CT chest June 2017   IMPRESSION:  Stable 6 mm right middle lobe pulmonary nodule   Continued follow-up recommended as described on        Mary Mai DO

## 2019-07-13 NOTE — PROGRESS NOTES
Progress note - Pulmonary Medicine   Lyndsey Graves 68 y o  female MRN: [id number]       Impression & Plan:   69-year-old female with past medical history of DVT, diabetes, CAD, anemia, anxiety, cervical radiculopathy, fibromyalgia, and depression who comes in for follow-up of asthma, allergic rhinitis and pulmonary nodules     1  Somnolence -  I am greatly concerned for her somnolence and frequent falls  She is on Xanax as a medication which I do not think is a good choice for her given her age, co morbidities (including untreated SARAY) and falls of recent  I am also concerned given untreated sleep apnea these medications will further cause respiratory suppression and may even be causing CO2 retention  While I understand she claims severe anxiety and panic, I believe that there are several other choices that may help and I recommend she follow with a psychiatrist for that  -  I recommended an ABG but the patient refuses because it hurts  -  I explained to her in depth my concern for possible respiratory failure that can be impending         -  She is willing to restart therapy for SARAY  I will send for a PSG as well  She has supposed claustrophobia but I explained to her that it can be managed with her SARAY  She would need to either choose anxiety medications that dont effect respiratory drive or NIV treatment with those medications  2    Pulmonary nodules in RML - stable over 2 years  Likely benign  No additional testing        3   Allergic rhinitis/seasonal allergies-history of anaphylactic shock with cats previously in the house   Previously doing allergy shots approximately 3 times a week       - She still has not performed the blood work    I am concerned bout the sedation, CO2 issues causing forgetfulness and poor judgement        - She is not using breo even though I recommended and gave her samples previously        4   Moderate restrictive lung disease secondary to obesity- encouraged weight loss        ______________________________________________________________________    HPI:    Belinda Pascal presents today for follow-up for her CT of the chest   CT chest was stable and she states that her breathing has been fine  However, she is very somnolent today  She talked about recent falls that she has been having due to what she dubs as "clumsiness"  She continues to use xanax for anxiety  She denies chest tightness, wheezing or cough  Review of Systems:  Review of Systems   Constitutional: Negative  HENT: Negative  Eyes: Negative  Respiratory: Negative  Cardiovascular: Negative  Genitourinary: Negative  Musculoskeletal: Negative  Neurological: Positive for dizziness and syncope  Negative for weakness and numbness  Hematological: Negative  Psychiatric/Behavioral: Positive for dysphoric mood and sleep disturbance  Negative for behavioral problems  Social history updates:  Social History     Tobacco Use   Smoking Status Former Smoker    Packs/day: 1 00    Years: 6 00    Pack years: 6 00    Types: Cigarettes    Last attempt to quit:     Years since quittin 5   Smokeless Tobacco Never Used   Tobacco Comment    Smoked about a half a pack for about 4 yrs  Quite about 50 yrs ago       Social History     Socioeconomic History    Marital status: /Civil Union     Spouse name: Not on file    Number of children: 2    Years of education: High school or GED    Highest education level: Not on file   Occupational History    Occupation: Retired   Social Needs    Financial resource strain: Not on file    Food insecurity:     Worry: Not on file     Inability: Not on file   [employer] needs:     Medical: Not on file     Non-medical: Not on file   Tobacco Use    Smoking status: Former Smoker     Packs/day: 1 00     Years: 6 00     Pack years: 6 00     Types: Cigarettes     Last attempt to quit:      Years since quittin 5    Smokeless tobacco: Never Used    Tobacco comment: Smoked about a half a pack for about 4 yrs  Quite about 50 yrs ago     Substance and Sexual Activity    Alcohol use: No    Drug use: No    Sexual activity: Never     Comment: No known STD risk factors   Lifestyle    Physical activity:     Days per week: Not on file     Minutes per session: Not on file    Stress: Not on file   Relationships    Social connections:     Talks on phone: Not on file     Gets together: Not on file     Attends Muslim service: Not on file     Active member of club or organization: Not on file     Attends meetings of clubs or organizations: Not on file     Relationship status: Not on file    Intimate partner violence:     Fear of current or ex partner: Not on file     Emotionally abused: Not on file     Physically abused: Not on file     Forced sexual activity: Not on file   Other Topics Concern    Not on file   Social History Narrative    Lives independently with spouse    No known risk factors    Denied:  Exercising regularly       PhysicalExamination:  Vitals:   /80   Pulse 70   Temp 97 7 °F (36 5 °C)   Resp 14   Ht 5' 1 5" (1 562 m)   Wt 89 4 kg (197 lb)   SpO2 99%   BMI 36 62 kg/m²   Physical Exam  General: Somnolent with conversation, Awake alert and oriented x 3, conversant without conversational dyspnea, NAD, normal affect  HEENT:  PERRL, Sclera noninjected, nonicteric OU, Nares patent,  no craniofacial abnormalities, Mucous membranes, moist, no oral lesions, normal dentition  NECK: Trachea midline, no accessory muscle use, no stridor, no cervical or supraclavicular adenopathy, JVP not elevated  CARDIAC: Reg, single s1/S2, no m/r/g  PULM: Decreased breath sounds and wheezing  ABD: Normoactive bowel sounds, soft nontender, nondistended, no rebound, no rigidity, no guarding  EXT: No cyanosis, no clubbing, no edema, normal capillary refill  NEURO: no focal neurologic deficits, AAOx3, moving all extremities appropriately    Diagnostic Data:  Labs: I personally reviewed the most recent laboratory data pertinent to today's visit  I have personally reviewed pertinent lab results  Lab Results   Component Value Date    WBC 7 85 04/24/2018    HGB 9 9 (L) 04/24/2018    HCT 30 6 (L) 04/24/2018    MCV 82 04/24/2018     04/24/2018     Lab Results   Component Value Date    GLUCOSE 248 (H) 11/09/2015    CALCIUM 9 2 04/24/2018     11/16/2017    K 5 6 (H) 04/24/2018    CO2 22 04/24/2018     04/24/2018    BUN 23 04/24/2018    CREATININE 1 42 (H) 04/24/2018     No results found for: IGE  Lab Results   Component Value Date    ALT 31 04/24/2018    AST 26 04/24/2018    ALKPHOS 129 (H) 04/24/2018    BILITOT 0 5 11/16/2017     PFT results: The most recent pulmonary function tests were reviewed  Ping Rios  5/22/18  Spirometry:  FEV1/FVC Ratio is 78%   FEV1 is 70% predicted   FVC is 67% predicted  Flow volume loop:  Normal  IMPRESSION:  · Spirometry demonstrates mild reduction in vital capacity which may be on the basis of patient effort, body habitus, restriction or air trapping   Lung volumes would be helpful for further evaluation     In office yen  5/24/17 Spirometry: Forced vital capacity: 1 76MXGy768% Predicted Values  Forced expiratory volume in one second: 1 00NJZu457% Predicted Value  FEV1/FVC ratio is 77%  PFT Interpretation: This study shows a mild reduction in the patient's forced vital capacity   There is no evidence of airflow obstruction     Imaging:  I personally reviewed the images on the [facility] system pertinent to today's visit  CT 05/21/2018-FINDINGS:  LUNGS:  Stable 2 to 3 mm right middle lobe pulmonary nodule is noted series 2 image 28   Stable 6 mm right middle lobe pulmonary nodule is noted series 2 image 30   No new nodules or masses are seen   Minimal linear scarring is again identified within   the left lower lobe      PLEURA:  Unremarkable      HEART/GREAT VESSELS:  Patient is status post coronary artery bypass grafting      MEDIASTINUM AND SAMANTHA:  Unremarkable        CT chest November 2017  FINDINGS:  LUNGS:  Stable 6 mm right middle lobe pulmonary nodule (series 3 image 30)   Adjacent 4 mm right middle lobe nodule (series 3 image 27) there is bibasilar dependent atelectasis   There is linear scarring versus atelectasis at the left lung base   In   retrospect is also stable    PLEURA:  Unremarkable      CT chest June 2017   IMPRESSION:  Stable 6 mm right middle lobe pulmonary nodule   Continued follow-up recommended as described on        Karl Sorto DO

## 2019-07-19 ENCOUNTER — OFFICE VISIT (OUTPATIENT)
Dept: INTERNAL MEDICINE CLINIC | Facility: CLINIC | Age: 74
End: 2019-07-19
Payer: COMMERCIAL

## 2019-07-19 VITALS
SYSTOLIC BLOOD PRESSURE: 120 MMHG | HEIGHT: 62 IN | WEIGHT: 196.6 LBS | RESPIRATION RATE: 16 BRPM | OXYGEN SATURATION: 97 % | BODY MASS INDEX: 36.18 KG/M2 | DIASTOLIC BLOOD PRESSURE: 62 MMHG | HEART RATE: 73 BPM

## 2019-07-19 DIAGNOSIS — E78.5 HYPERLIPIDEMIA, UNSPECIFIED HYPERLIPIDEMIA TYPE: ICD-10-CM

## 2019-07-19 DIAGNOSIS — E11.8 TYPE 2 DIABETES MELLITUS WITH COMPLICATION, WITH LONG-TERM CURRENT USE OF INSULIN (HCC): Primary | ICD-10-CM

## 2019-07-19 DIAGNOSIS — S02.2XXD CLOSED FRACTURE OF NASAL BONE WITH ROUTINE HEALING, SUBSEQUENT ENCOUNTER: ICD-10-CM

## 2019-07-19 DIAGNOSIS — I10 ESSENTIAL HYPERTENSION: Chronic | ICD-10-CM

## 2019-07-19 DIAGNOSIS — F41.9 ANXIETY: ICD-10-CM

## 2019-07-19 DIAGNOSIS — Z79.4 TYPE 2 DIABETES MELLITUS WITH COMPLICATION, WITH LONG-TERM CURRENT USE OF INSULIN (HCC): Primary | ICD-10-CM

## 2019-07-19 DIAGNOSIS — E03.9 HYPOTHYROIDISM, UNSPECIFIED TYPE: ICD-10-CM

## 2019-07-19 PROCEDURE — 3074F SYST BP LT 130 MM HG: CPT | Performed by: INTERNAL MEDICINE

## 2019-07-19 PROCEDURE — 99214 OFFICE O/P EST MOD 30 MIN: CPT | Performed by: INTERNAL MEDICINE

## 2019-07-19 NOTE — PROGRESS NOTES
BMI Counseling: Body mass index is 36 55 kg/m²  Discussed the patient's BMI with her  The BMI is above average  BMI counseling and education was provided to the patient  Nutrition recommendations include reducing portion sizes  Assessment/Plan:    Hypothyroid  Hypothyroidism controlled the patient is currently euthyroid I will be ordering a TSH prior to the next office visit and the patient will continue with current medical regiment; we will continue to monitor the patient's progress  Type 2 diabetes mellitus with complication, with long-term current use of insulin (HCC)  Lab Results   Component Value Date    HGBA1C 8 8 08/13/2018       No results for input(s): POCGLU in the last 72 hours  Blood Sugar Average: Last 72 hrs:   suboptimal control reduce carbohydrates, reduce sweets continue working with Endocrinology routine exercise advised, weight loss advised annual eye examination recommended  I will be ordering diabetic laboratories including comprehensive metabolic panel, hemoglobin A1c, urine microalbumin, lipid panel  Optimal A1c under 7 counseled patient    Essential hypertension  Hypertension - controlled, I have counseled patient following healthy balance diet, I would like the patient reduce sodium, exercise routinely, I would like the patient continued the med current medical regiment and we will continue to monitor      Closed fracture of nasal bones  Review the ER visit will have the patient see ENT for the nasal fracture/hemorrhage into the maxillary sinus    Anxiety  No suicidal ideation she has seen the lung specialist who is concerned about benzodiazepine use because of her lung condition she has been on it many years she reports, at this point time we would like the patient to see Psychiatry Dr Anyi Novoa for consideration of optimal treatment and instructions regarding weaning off the Xanax and alternative therapies currently she is clinically stable         Problem List Items Addressed This Visit        Endocrine    Type 2 diabetes mellitus with complication, with long-term current use of insulin (HCC) - Primary (Chronic)     Lab Results   Component Value Date    HGBA1C 8 8 08/13/2018       No results for input(s): POCGLU in the last 72 hours  Blood Sugar Average: Last 72 hrs:   suboptimal control reduce carbohydrates, reduce sweets continue working with Endocrinology routine exercise advised, weight loss advised annual eye examination recommended  I will be ordering diabetic laboratories including comprehensive metabolic panel, hemoglobin A1c, urine microalbumin, lipid panel  Optimal A1c under 7 counseled patient         Relevant Orders    Comprehensive metabolic panel    Hemoglobin A1C    Microalbumin / creatinine urine ratio    Hypothyroid (Chronic)     Hypothyroidism controlled the patient is currently euthyroid I will be ordering a TSH prior to the next office visit and the patient will continue with current medical regiment; we will continue to monitor the patient's progress  Relevant Orders    TSH, 3rd generation       Cardiovascular and Mediastinum    Essential hypertension (Chronic)     Hypertension - controlled, I have counseled patient following healthy balance diet, I would like the patient reduce sodium, exercise routinely, I would like the patient continued the med current medical regiment and we will continue to monitor              Musculoskeletal and Integument    Closed fracture of nasal bones     Review the ER visit will have the patient see ENT for the nasal fracture/hemorrhage into the maxillary sinus         Relevant Orders    Ambulatory Referral to Otolaryngology       Other    Anxiety (Chronic)     No suicidal ideation she has seen the lung specialist who is concerned about benzodiazepine use because of her lung condition she has been on it many years she reports, at this point time we would like the patient to see Psychiatry Dr Mijares Offer for consideration of optimal treatment and instructions regarding weaning off the Xanax and alternative therapies currently she is clinically stable         Relevant Orders    Ambulatory referral to Psychiatry    Hyperlipidemia (Chronic)    Relevant Orders    Lipid Panel with Direct LDL reflex          Return to office 3  months  call if any problems  Subjective:      Patient ID: Jimy Brewer is a 68 y o  female  HPI  71-year old female coming in for a follow up office visit regarding type 2 diabetes, hyperlipidemia, hypothyroidism, anxiety, close fracture of the nasal bone, hypertension; The patient reports me compliant taking medications without untoward side effects the  The patient is here to review his medical condition, update me on the medical condition and the patient reports me no hospitalizations and 1 ER visits  The pt reports er visit -fall picking up the dog, bleeding   The following portions of the patient's history were reviewed and updated as appropriate: allergies, current medications, past family history, past medical history, past social history, past surgical history and problem list  The pt seeing  Oral Surg Dr Valle People for fx fracture of the zygoma and nasal fracture  She reports me she was able to control bleeding on her own after some time  Up until he she did go to ER the next day CT scans did reveal a small fracture of the zygoma and also a depressed nasal fracture  He also showed hemorrhage into the left maxillary sinus  She reports me at this point time she does not have any further nasal bleeding but she notices pressure along her number sinuses  She has been to the oral surgeon Dr Adelina Cerda the who reports no surgeries necessary for the zygoma fracture no further follow-up is warranted  Patient reports me continued pressure in her sinus since the fall    Also patient reports me anxiety regarding her mother's condition at the nursing home she has been to lung specialist who is recommending consideration of alternative medicine for Xanax that she has been on for many years; in the past she has tried other medications unsuccessfully  Currently she is stable no suicidal ideation she is interested in seeing Psychiatry regarding this  mother is in the assistive care and delusional     Review of Systems   Constitutional: Negative for activity change, appetite change and unexpected weight change  HENT: Negative for congestion and postnasal drip  Eyes: Negative for visual disturbance  Respiratory: Negative for cough and shortness of breath  Cardiovascular: Negative for chest pain  Gastrointestinal: Negative for abdominal pain, diarrhea, nausea and vomiting  Neurological: Negative for dizziness, light-headedness and headaches  Hematological: Negative for adenopathy  Psychiatric/Behavioral: Negative for suicidal ideas  The patient is nervous/anxious  Objective:    Return in about 4 months (around 11/19/2019)  Procedure: Ct Head Without Contrast    Result Date: 6/24/2019  Narrative: CT BRAIN - WITHOUT CONTRAST INDICATION:   Fall  COMPARISON:  None  TECHNIQUE:  CT examination of the brain was performed  In addition to axial images, coronal 2D reformatted images were created and submitted for interpretation  Radiation dose length product (DLP) for this visit:  894 mGy-cm   This examination, like all CT scans performed in the [facility], was performed utilizing techniques to minimize radiation dose exposure, including the use of iterative reconstruction and automated exposure control  IMAGE QUALITY:  Diagnostic  FINDINGS: PARENCHYMA: Decreased attenuation is noted in periventricular and subcortical white matter demonstrating an appearance that is statistically most likely to represent mild microangiopathic change  No CT signs of acute infarction  No intracranial mass, mass effect or midline shift  No acute parenchymal hemorrhage   VENTRICLES AND EXTRA-AXIAL SPACES:  Normal for the patient's age  VISUALIZED ORBITS AND PARANASAL SINUSES:  Unremarkable  CALVARIUM AND EXTRACRANIAL SOFT TISSUES:  There is a hematoma and stranding in the soft tissues overlying the left zygoma  There is a minimally depressed left nasal bone fracture  There is hematoma in the left maxillary sinus with no definite associated fracture  Impression: 1  Minimally depressed left nasal bone fracture  2  Hemorrhage in the left maxillary sinus with no definite associated fracture  3  Small hematoma in the soft tissues overlying the left zygoma  4   No acute intracranial abnormality  The study was marked in [facility] for immediate notification  Workstation performed: [id number]     Procedure: Ct Facial Bones Without Contrast    Result Date: 6/24/2019  Narrative: CT FACIAL BONES WITHOUT INTRAVENOUS CONTRAST INDICATION:   Fall  COMPARISON: None  TECHNIQUE:  Axial CT images were obtained through the facial bones with additional sagittal and coronal reconstructions  Radiation dose length product (DLP) for this visit:  400 mGy-cm   This examination, like all CT scans performed in the [facility], was performed utilizing techniques to minimize radiation dose exposure, including the use of iterative reconstruction and automated exposure control  IMAGE QUALITY:  Diagnostic  FINDINGS: FACIAL BONES:  There is subtle buckling of the left orbital floor  Hemorrhage in the left maxillary sinus  Minimally depressed left nasal bone fracture  No additional fracture  ORBITS:  Orbital globes, optic nerves, and extraocular muscles appear symmetric and normal  There is no evidence of retrobulbar mass, abscess, or hematoma  SINUSES:  Normal  SOFT TISSUES:  Hematoma in the deep subcutaneous tissues overlying the left zygoma  Impression: 1  Subtle buckling of the left orbital floor concerning for nondisplaced fracture with associated hemorrhage in the left maxillary sinus   No herniation of intraorbital contents  2  Minimally depressed left nasal bone fracture  The study was marked in [facility] for immediate notification  Workstation performed: [id number]     Procedure: Ct Cervical Spine Without Contrast    Result Date: 6/24/2019  Narrative: CT CERVICAL SPINE - WITHOUT CONTRAST INDICATION:   Fall  COMPARISON:  None  TECHNIQUE:  CT examination of the cervical spine was performed without intravenous contrast   Contiguous axial images were obtained  Sagittal and coronal reconstructions were performed  Radiation dose length product (DLP) for this visit:  293 mGy-cm   This examination, like all CT scans performed in the [facility], was performed utilizing techniques to minimize radiation dose exposure, including the use of iterative reconstruction and automated exposure control  IMAGE QUALITY:  Diagnostic  FINDINGS: ALIGNMENT:  Normal alignment of the cervical spine  No subluxation  VERTEBRAL BODIES:  No fracture  DEGENERATIVE CHANGES:  Moderate multilevel cervical degenerative changes are noted  No critical central canal stenosis  PREVERTEBRAL AND PARASPINAL SOFT TISSUES:  Unremarkable  THORACIC INLET:  Normal      Impression: No cervical spine fracture or traumatic malalignment    Workstation performed: [id number]         Allergies   Allergen Reactions    Molds & Smuts     Other      RAGWEED, CAT DANDER, DOG DANDER     Pollen Extract Other (See Comments)     Cold symptoms         Past Medical History:   Diagnosis Date    Acute embolism and thrombosis of unspecified deep veins of unspecified lower extremity (HCC)     Last Assessed:  5/18/17    Anemia     Anosmia     Anxiety     Arthritis     Asthma     Back pain     Bilateral macular retinal edema     CAD (coronary artery disease)     Cataract     Cervical disc herniation     Cervical radiculopathy     Cervical spinal stenosis     Cervical spondylolysis     Chronic mastoiditis     Complex endometrial hyperplasia     Depression     [facility] [address] [facility]     DVT [facility]deep venous thrombosis) [facility] [address] [facility]     Fibromyalgia     Hyperlipidemia     Hypertension     Hypothyroid     Lumbar radiculopathy     Obese     Spinal stenosis     Stomach ulcer     Thyroid disease      Past Surgical History:   Procedure Laterality Date    BACK SURGERY      CARPAL TUNNEL RELEASE      CATARACT EXTRACTION      CHOLECYSTECTOMY      COLONOSCOPY      CORONARY ANGIOPLASTY      CORONARY ARTERY BYPASS GRAFT      CYSTOSCOPY N/A 6/20/2017    Procedure: Nuno Rhodes;  Surgeon: Lisbeth Pollock MD;  Location: [facility];  Service: Gynecology Oncology    IN LAPAROSCOPY W TOT HYSTERECTUTERUS <=[address]  W TUBE/OVARY N/A 6/20/2017    Procedure: ROBOTIC HYSTERECTOMY; BILATERAL SALPINO-OOPHERECTOMY; umbilical hernia repair ;  Surgeon: Lisbeth Pollock MD;  Location[facility];  Service: Gynecology Oncology    TONSILECTOMY AND ADNOIDECTOMY      UMBILICAL HERNIA REPAIR       Current Outpatient Medications on File Prior to Visit   Medication Sig Dispense Refill    albuterol (2 5 mg/3 mL) 0 083 % nebulizer solution Take 3 mL (2 5 mg total) by nebulization every 6 (six) hours as needed for wheezing 75 mL 0    albuterol (PROAIR HFA) 90 mcg/act inhaler Inhale 2 puffs every 6 (six) hours as needed       ALPRAZolam (XANAX) 0 5 mg tablet Take 1 tablet (0 5 mg total) by mouth 2 (two) times a day as needed for anxiety 60 tablet 1    aspirin 81 MG tablet Take 81 mg by mouth daily      cholecalciferol (VITAMIN D3) 1,000 units tablet Take 2,000 Units by mouth daily      Co-Enzyme Q-10 100 MG CAPS Take 1 capsule by mouth 2 (two) times a day        diclofenac sodium (VOLTAREN) 1 % Apply 2 g topically 4 (four) times a day (Patient taking differently: Apply 2 g topically as needed ) 1 Tube 0    diltiazem (TIAZAC) 300 MG 24 hr capsule TAKE 1 CAPSULE BY MOUTH EVERY DAY IN THE MORNING  2    DULoxetine (CYMBALTA) 60 mg delayed release capsule Take 60 mg by mouth every morning  enalapril (VASOTEC) 20 mg tablet Take 20 mg by mouth daily      fluticasone (FLONASE) 50 mcg/act nasal spray 2 sprays into each nostril daily 16 g 0    insulin aspart (NovoLOG) 100 units/mL injection Inject 12 Units under the skin 3 (three) times a day before meals (Patient taking differently: Inject under the skin 3 (three) times a day before meals )  0    Insulin Disposable Pump (OMNIPOD DASH 5 PACK) MISC CHANGE PODS EVERY 2 DAYS UTD  3    insulin glargine (LANTUS) 100 units/mL subcutaneous injection Inject 26 Units under the skin daily at bedtime      isosorbide mononitrate (IMDUR) 30 mg 24 hr tablet TAKE 1 TABLET DAILY AT BEDTIME, 30 DAYS, #30, 4 REFILLS, EXTENDED RELEASE   6    levothyroxine 50 mcg tablet Take 50 mcg by mouth daily      metFORMIN (GLUCOPHAGE) 500 mg tablet Take 500 mg by mouth 2 (two) times a day with meals      nitroglycerin (NITROLINGUAL) 0 4 mg/spray spray USE ONE SPRAY Q 5 MINUTES AS NEEDED FOR CHEST PAIN  IF NO RELIEF, CALL 911   1    rosuvastatin (CRESTOR) 20 MG tablet Take 20 mg by mouth every evening  2    tiZANidine (ZANAFLEX) 2 mg tablet Take 2 mg by mouth daily at bedtime       No current facility-administered medications on file prior to visit        Family History   Problem Relation Age of Onset    Arthritis Mother     Leukemia Mother     Other Mother         Anxiety, major depressive disorder, recurrent episode with atypical features    Coronary artery disease Father         Heart problem    Diabetes Father     Other Father         Infectious disease    Alzheimer's disease Maternal Grandmother     Other Maternal Grandfather         Heart problem    Other Daughter         Anxiety, major depressive disorder, recurrent episode with atypical features    Alcohol abuse Other         Grandparent    Cancer Family     Diabetes Family     Hypertension Family     Other Family         Reported prior back trouble, thyroid disorder     Social History     Socioeconomic History    Marital status: /Civil Union     Spouse name: Not on file    Number of children: 2    Years of education: High school or GED    Highest education level: Not on file   Occupational History    Occupation: Retired   Social Needs    Financial resource strain: Not on file    Food insecurity:     Worry: Not on file     Inability: Not on file   Impulsiv needs:     Medical: Not on file     Non-medical: Not on file   Tobacco Use    Smoking status: Former Smoker     Packs/day: 1 00     Years: 6 00     Pack years: 6 00     Types: Cigarettes     Last attempt to quit:      Years since quittin 5    Smokeless tobacco: Never Used    Tobacco comment: Smoked about a half a pack for about 4 yrs  Quite about 50 yrs ago     Substance and Sexual Activity    Alcohol use: No    Drug use: No    Sexual activity: Never     Comment: No known STD risk factors   Lifestyle    Physical activity:     Days per week: Not on file     Minutes per session: Not on file    Stress: Not on file   Relationships    Social connections:     Talks on phone: Not on file     Gets together: Not on file     Attends Hindu service: Not on file     Active member of club or organization: Not on file     Attends meetings of clubs or organizations: Not on file     Relationship status: Not on file    Intimate partner violence:     Fear of current or ex partner: Not on file     Emotionally abused: Not on file     Physically abused: Not on file     Forced sexual activity: Not on file   Other Topics Concern    Not on file   Social History Narrative    Lives independently with spouse    No known risk factors    Denied:  Exercising regularly     Vitals:    19 1004   BP: 120/62   Pulse: 73   Resp: 16   SpO2: 97%   Weight: 89 2 kg (196 lb 9 6 oz)   Height: 5' 1 5" (1 562 m)     Results for orders placed or performed in visit on 19   Drug Monitoring, Panel 6 with Confirmation, Urine   Result Value Ref Range Creatinine, Urine 167 5 > or = 20 0 mg/dL    pH, Urine 6 11 4 5 - 9 0    Oxidant NEGATIVE <200 mcg/mL    Amphetamine, Ur NEGATIVE <500 ng/mL    Barbituate UR NEGATIVE <300 ng/mL    Benzodiazepines POSITIVE (A) <100 ng/mL    Alphahydroxyalprazolam 409 (H) <25 ng/mL    Alphahydroxymidazolam NEGATIVE <50 ng/mL    Alphahydroxytriazolam NEGATIVE <50 ng/mL    Aminoclonazepam NEGATIVE <25 ng/mL    Hydroxyethylflurazepam NEGATIVE <50 ng/mL    Lorazepam NEGATIVE <50 ng/mL    Nordiazepam NEGATIVE <50 ng/mL    Oxazepam NEGATIVE <50 ng/mL    Temazepam NEGATIVE <50 ng/mL    Marijuana Metabolite NEGATIVE <20 ng/mL    Cocaine Metabolite NEGATIVE <150 ng/mL    Methadone Metabolite NEGATIVE <100 ng/mL    Opiates NEGATIVE <100 ng/mL    Oxycodone NEGATIVE <100 ng/mL    Phencyclidine NEGATIVE <25 ng/mL    Comments      Alcohol Metabolites NEGATIVE <500 ng/mL    Comments      6 Acetylmorphine NEGATIVE <10 ng/mL    Comments       Weight (last 2 days)     Date/Time   Weight    07/19/19 1004   89 2 (196 6)            Body mass index is 36 55 kg/m²  BP      Temp      Pulse     Resp      SpO2        Vitals:    07/19/19 1004   Weight: 89 2 kg (196 lb 9 6 oz)     Vitals:    07/19/19 1004   Weight: 89 2 kg (196 lb 9 6 oz)       /62   Pulse 73   Resp 16   Ht 5' 1 5" (1 562 m)   Wt 89 2 kg (196 lb 9 6 oz)   SpO2 97%   BMI 36 55 kg/m²          Physical Exam   Constitutional: She appears well-developed and well-nourished  HENT:   Head: Normocephalic  Mouth/Throat: Oropharynx is clear and moist    Eyes: Pupils are equal, round, and reactive to light  Conjunctivae are normal  Right eye exhibits no discharge  Left eye exhibits no discharge  No scleral icterus  Neck: Neck supple  Cardiovascular: Normal rate, regular rhythm and intact distal pulses  Exam reveals no gallop and no friction rub  Murmur heard  Pulmonary/Chest: Breath sounds normal  No respiratory distress  She has no wheezes  She has no rales  Abdominal: Soft  Bowel sounds are normal  She exhibits no distension and no mass  There is no tenderness  There is no rebound and no guarding  Musculoskeletal: She exhibits no edema or deformity  Lymphadenopathy:     She has no cervical adenopathy  Neurological: She is alert  Coordination normal    Psychiatric: Her mood appears anxious  She does not exhibit a depressed mood  She expresses no suicidal ideation

## 2019-07-21 PROBLEM — S02.2XXA CLOSED FRACTURE OF NASAL BONES: Status: ACTIVE | Noted: 2019-07-21

## 2019-07-21 NOTE — ASSESSMENT & PLAN NOTE
Review the ER visit will have the patient see ENT for the nasal fracture/hemorrhage into the maxillary sinus

## 2019-07-21 NOTE — ASSESSMENT & PLAN NOTE
No suicidal ideation she has seen the lung specialist who is concerned about benzodiazepine use because of her lung condition she has been on it many years she reports, at this point time we would like the patient to see Psychiatry Dr Dayna Olguin for consideration of optimal treatment and instructions regarding weaning off the Xanax and alternative therapies currently she is clinically stable

## 2019-07-21 NOTE — ASSESSMENT & PLAN NOTE
Lab Results   Component Value Date    HGBA1C 8 8 08/13/2018       No results for input(s): POCGLU in the last 72 hours  Blood Sugar Average: Last 72 hrs:   suboptimal control reduce carbohydrates, reduce sweets continue working with Endocrinology routine exercise advised, weight loss advised annual eye examination recommended  I will be ordering diabetic laboratories including comprehensive metabolic panel, hemoglobin A1c, urine microalbumin, lipid panel    Optimal A1c under 7 counseled patient

## 2019-08-02 ENCOUNTER — OFFICE VISIT (OUTPATIENT)
Dept: PODIATRY | Facility: CLINIC | Age: 74
End: 2019-08-02
Payer: COMMERCIAL

## 2019-08-02 VITALS
HEART RATE: 74 BPM | HEIGHT: 61 IN | BODY MASS INDEX: 36.25 KG/M2 | SYSTOLIC BLOOD PRESSURE: 141 MMHG | DIASTOLIC BLOOD PRESSURE: 57 MMHG | WEIGHT: 192 LBS

## 2019-08-02 DIAGNOSIS — L85.1 ACQUIRED KERATODERMA: ICD-10-CM

## 2019-08-02 DIAGNOSIS — F41.9 ANXIETY: ICD-10-CM

## 2019-08-02 DIAGNOSIS — E11.42 DIABETIC POLYNEUROPATHY ASSOCIATED WITH TYPE 2 DIABETES MELLITUS (HCC): ICD-10-CM

## 2019-08-02 DIAGNOSIS — B35.1 ONYCHOMYCOSIS: Primary | ICD-10-CM

## 2019-08-02 PROCEDURE — 11056 PARNG/CUTG B9 HYPRKR LES 2-4: CPT | Performed by: PODIATRIST

## 2019-08-02 PROCEDURE — 11721 DEBRIDE NAIL 6 OR MORE: CPT | Performed by: PODIATRIST

## 2019-08-02 RX ORDER — ALPRAZOLAM 0.5 MG/1
0.5 TABLET ORAL 2 TIMES DAILY PRN
Qty: 60 TABLET | Refills: 1 | Status: SHIPPED | OUTPATIENT
Start: 2019-08-02 | End: 2019-10-04 | Stop reason: SDUPTHER

## 2019-08-02 NOTE — TELEPHONE ENCOUNTER
PDMP CHECKED  LAST FILL: 6/29  QUANT: 60      Prescriptions   Filled  ID  Written  Drug  QTY  Days  Prescriber  Rx #  Pharmacy *  Refills  Daily Dose  Pymt Type      06/29/2019  1  05/30/2019  ALPRAZOLAM 0 5 MG TABLET  60 0  30  [facility]  [id number]  [facility] ([id number])  1   Medicare [place]

## 2019-08-02 NOTE — PROGRESS NOTES
PATIENT:  Cory Jung  1945    ASSESSMENT/PLAN:  1  Onychomycosis     2  Acquired keratoderma     3  Diabetic polyneuropathy associated with type 2 diabetes mellitus (HCC)                Disease prevention and related risk factors of diabetes were identified and discussed  The patient was educated in proper foot wear for diabetics  Also educated in daily foot assessment and routine diabetic foot care  Discussed the importance of controlling BS through diet and exercise  The patient will follow up in 9 weeks for further diabetic foot exam and care     PROCEDURE:  All mycotic toenails were reduced and debrided in length, width, and girth using a nail nipper and dremel  All hyperkeratotic skin lesion(s) were sharply pared with a scalpel / forcep with no bleeding or evidence of ulceration  Patient tolerated procedure(s) well without complications  HPI:  Cory Jung is a 68 y  o year old female seen for diabetic foot exam   The patient has class findings and diabetic neuropathy  BS has been better with insulin pump  The patient complained of thick toenails and calluses causing pain  The patient denied any acute pedal disorder, redness, acute swelling, or recent injury            PAST MEDICAL HISTORY:  Past Medical History:   Diagnosis Date    Acute embolism and thrombosis of unspecified deep veins of unspecified lower extremity (HCC)     Last Assessed:  5/18/17    Anemia     Anosmia     Anxiety     Arthritis     Asthma     Back pain     Bilateral macular retinal edema     CAD (coronary artery disease)     Cataract     Cervical disc herniation     Cervical radiculopathy     Cervical spinal stenosis     Cervical spondylolysis     Chronic mastoiditis     Complex endometrial hyperplasia     Depression     Diabetes mellitus (HCC)     DVT (deep venous thrombosis) (HCC)     Fibromyalgia     Hyperlipidemia     Hypertension     Hypothyroid     Lumbar radiculopathy  Obese     Spinal stenosis     Stomach ulcer     Thyroid disease        PAST SURGICAL HISTORY:  Past Surgical History:   Procedure Laterality Date    BACK SURGERY      CARPAL TUNNEL RELEASE      CATARACT EXTRACTION      CHOLECYSTECTOMY      COLONOSCOPY      CORONARY ANGIOPLASTY      CORONARY ARTERY BYPASS GRAFT      CYSTOSCOPY N/A 6/20/2017    Procedure: Oly Haines;  Surgeon: Rosetta Ball MD;  [facility];  Service: Gynecology Oncology    NM LAPAROSCOPY W TOT HYSTERECTUTERUS <=250 GRAM  W TUBE/OVARY N/A 6/20/2017    Procedure: ROBOTIC HYSTERECTOMY; BILATERAL SALPINO-OOPHERECTOMY; umbilical hernia repair ;  Surgeon: Rosetta Ball MD;  [facility];  Service: Gynecology Oncology    TONSILECTOMY AND ADNOIDECTOMY      UMBILICAL HERNIA REPAIR          ALLERGIES:  Molds & smuts;  Other; and Pollen extract    MEDICATIONS:  Current Outpatient Medications   Medication Sig Dispense Refill    albuterol (2 5 mg/3 mL) 0 083 % nebulizer solution Take 3 mL (2 5 mg total) by nebulization every 6 (six) hours as needed for wheezing 75 mL 0    albuterol (PROAIR HFA) 90 mcg/act inhaler Inhale 2 puffs every 6 (six) hours as needed       aspirin 81 MG tablet Take 81 mg by mouth daily      cholecalciferol (VITAMIN D3) 1,000 units tablet Take 2,000 Units by mouth daily      Co-Enzyme Q-10 100 MG CAPS Take 1 capsule by mouth 2 (two) times a day        diclofenac sodium (VOLTAREN) 1 % Apply 2 g topically 4 (four) times a day (Patient taking differently: Apply 2 g topically as needed ) 1 Tube 0    diltiazem (TIAZAC) 300 MG 24 hr capsule TAKE 1 CAPSULE BY MOUTH EVERY DAY IN THE MORNING  2    DULoxetine (CYMBALTA) 60 mg delayed release capsule Take 60 mg by mouth every morning      enalapril (VASOTEC) 20 mg tablet Take 20 mg by mouth daily      fluticasone (FLONASE) 50 mcg/act nasal spray 2 sprays into each nostril daily 16 g 0    insulin aspart (NovoLOG) 100 units/mL injection Inject 12 Units under the skin 3 (three) times a day before meals (Patient taking differently: Inject under the skin 3 (three) times a day before meals )  0    Insulin Disposable Pump (OMNIPOD DASH 5 PACK) MISC CHANGE PODS EVERY 2 DAYS UTD  3    insulin glargine (LANTUS) 100 units/mL subcutaneous injection Inject 26 Units under the skin daily at bedtime      isosorbide mononitrate (IMDUR) 30 mg 24 hr tablet TAKE 1 TABLET DAILY AT BEDTIME, 30 DAYS, #30, 4 REFILLS, EXTENDED RELEASE   6    levothyroxine 50 mcg tablet Take 50 mcg by mouth daily      metFORMIN (GLUCOPHAGE) 500 mg tablet Take 500 mg by mouth 2 (two) times a day with meals      nitroglycerin (NITROLINGUAL) 0 4 mg/spray spray USE ONE SPRAY Q 5 MINUTES AS NEEDED FOR CHEST PAIN  IF NO RELIEF, CALL 911   1    rosuvastatin (CRESTOR) 20 MG tablet Take 20 mg by mouth every evening  2    tiZANidine (ZANAFLEX) 2 mg tablet Take 2 mg by mouth daily at bedtime      ALPRAZolam (XANAX) 0 5 mg tablet Take 1 tablet (0 5 mg total) by mouth 2 (two) times a day as needed for anxiety 60 tablet 1     No current facility-administered medications for this visit  SOCIAL HISTORY:  Social History     Socioeconomic History    Marital status: /Civil Union     Spouse name: None    Number of children: 2    Years of education: High school or GED    Highest education level: None   Occupational History    Occupation: Retired   Social Needs    Financial resource strain: None    Food insecurity:     Worry: None     Inability: None    Transportation needs:     Medical: None     Non-medical: None   Tobacco Use    Smoking status: Former Smoker     Packs/day: 1 00     Years: 6 00     Pack years: 6 00     Types: Cigarettes     Last attempt to quit:      Years since quittin 6    Smokeless tobacco: Never Used    Tobacco comment: Smoked about a half a pack for about 4 yrs  Quite about 50 yrs ago  Substance and Sexual Activity    Alcohol use: No    Drug use:  No  Sexual activity: Never     Comment: No known STD risk factors   Lifestyle    Physical activity:     Days per week: None     Minutes per session: None    Stress: None   Relationships    Social connections:     Talks on phone: None     Gets together: None     Attends [employer] witness service: None     Active member of club or organization: None     Attends meetings of clubs or organizations: None     Relationship status: None    Intimate partner violence:     Fear of current or ex partner: None     Emotionally abused: None     Physically abused: None     Forced sexual activity: None   Other Topics Concern    None   Social History Narrative    Lives independently with spouse    No known risk factors    Denied:  Exercising regularly        REVIEW OF SYSTEMS:  GENERAL: NAD, afebrile  HEART: No chest pain, or palpitation  RESPIRATORY:  No acute SOB or cough  GI: No Nausea, vomit or diarrhea  NEUROLOGIC: No syncope or acute weakness    PHYSICAL EXAM:  VASCULAR EXAM  Dorsalis pedis  +1, Posterior tibial artery  absent  The patient has class findings with skin atrophy, lack of digital hair, and nail dystrophy  There is +1 lower extremity edema bilaterally  Venous stasis skin changes noted BLE  NEUROLOGIC EXAM  Sensation is intact to light touch  Sensation is intact to 10gm monofilament  No focal neurologic deficit  DERMATOLOGIC EXAM:   No ulcer or cellulitis noted  The patient has hypertrophic toenails with discoloration, onycholysis, and subungal debris  No notable skin lesion  Patient has hyperkeratosis in bilateral submet 5 and right hallux IPJ  Skin is dry  MUSCULOSKELETAL EXAM:   No acute joint pain, edema, or redness  No acute musculoskeletal problem  Patient has deformity including hammertoe

## 2019-09-12 DIAGNOSIS — F41.9 ANXIETY: Primary | ICD-10-CM

## 2019-09-12 RX ORDER — DULOXETIN HYDROCHLORIDE 60 MG/1
60 CAPSULE, DELAYED RELEASE ORAL EVERY MORNING
Qty: 30 CAPSULE | Refills: 0 | Status: SHIPPED | OUTPATIENT
Start: 2019-09-12 | End: 2019-10-07 | Stop reason: SDUPTHER

## 2019-09-12 NOTE — TELEPHONE ENCOUNTER
Patient is asking if you can refill this medication for her for a 30 day supply  She is no longer seeing the doctor that prescribed the medication and she does not want to just stop taking this medication      Please advise

## 2019-09-19 ENCOUNTER — TELEPHONE (OUTPATIENT)
Dept: INTERNAL MEDICINE CLINIC | Facility: CLINIC | Age: 74
End: 2019-09-19

## 2019-10-01 ENCOUNTER — ANTICOAG VISIT (OUTPATIENT)
Dept: INTERNAL MEDICINE CLINIC | Facility: CLINIC | Age: 74
End: 2019-10-01

## 2019-10-01 ENCOUNTER — CLINICAL SUPPORT (OUTPATIENT)
Dept: INTERNAL MEDICINE CLINIC | Facility: CLINIC | Age: 74
End: 2019-10-01
Payer: COMMERCIAL

## 2019-10-01 DIAGNOSIS — Z79.4 TYPE 2 DIABETES MELLITUS WITH COMPLICATION, WITH LONG-TERM CURRENT USE OF INSULIN (HCC): Primary | ICD-10-CM

## 2019-10-01 DIAGNOSIS — E11.8 TYPE 2 DIABETES MELLITUS WITH COMPLICATION, WITH LONG-TERM CURRENT USE OF INSULIN (HCC): Primary | ICD-10-CM

## 2019-10-01 LAB
CREAT UR-MCNC: 111 MG/DL
MICROALBUMIN UR-MCNC: 182 MG/L (ref 0–20)
MICROALBUMIN/CREAT 24H UR: 164 MG/G CREATININE (ref 0–30)

## 2019-10-01 PROCEDURE — 82570 ASSAY OF URINE CREATININE: CPT | Performed by: INTERNAL MEDICINE

## 2019-10-01 PROCEDURE — 99211 OFF/OP EST MAY X REQ PHY/QHP: CPT

## 2019-10-01 PROCEDURE — 82043 UR ALBUMIN QUANTITATIVE: CPT | Performed by: INTERNAL MEDICINE

## 2019-10-04 DIAGNOSIS — F41.9 ANXIETY: ICD-10-CM

## 2019-10-04 RX ORDER — ALPRAZOLAM 0.5 MG/1
0.5 TABLET ORAL 2 TIMES DAILY PRN
Qty: 60 TABLET | Refills: 1 | Status: SHIPPED | OUTPATIENT
Start: 2019-10-04 | End: 2019-11-29 | Stop reason: SDUPTHER

## 2019-10-07 DIAGNOSIS — F41.9 ANXIETY: ICD-10-CM

## 2019-10-07 RX ORDER — DULOXETIN HYDROCHLORIDE 60 MG/1
CAPSULE, DELAYED RELEASE ORAL
Qty: 30 CAPSULE | Refills: 0 | Status: SHIPPED | OUTPATIENT
Start: 2019-10-07 | End: 2019-11-08 | Stop reason: SDUPTHER

## 2019-10-09 ENCOUNTER — SOCIAL WORK (OUTPATIENT)
Dept: BEHAVIORAL/MENTAL HEALTH CLINIC | Facility: CLINIC | Age: 74
End: 2019-10-09
Payer: COMMERCIAL

## 2019-10-09 DIAGNOSIS — F32.A ANXIETY AND DEPRESSION: Primary | ICD-10-CM

## 2019-10-09 DIAGNOSIS — F41.9 ANXIETY AND DEPRESSION: Primary | ICD-10-CM

## 2019-10-09 PROCEDURE — 90834 PSYTX W PT 45 MINUTES: CPT | Performed by: SOCIAL WORKER

## 2019-10-09 NOTE — PSYCH
Assessment/Plan: Manage depression and anxiety     There are no diagnoses linked to this encounter  Subjective: Reports an increase in depressed mood as evidenced by a decrease in energy and motivation  No SI  Struggling with her mother and her behavior, her  and his mood issues and her daughter and constant need for financial and emotional support  Stress clearly affecting her  Has some limited social supports  Patient ID: Ayad Martin is a 68 y o  female  Met with Kat Oneil for 60 minutes from 2:30PM-3:30PM  Struggling with increased stress, anxiety and depression related to stressors  No SI  Invested in her family and penny  Review of Systems   Psychiatric/Behavioral: Positive for dysphoric mood  The patient is nervous/anxious  Objective: Kat Oneil presents as anxious and overwhelmed  Stress creating mood issues for her  Presents as verbal, cooperative and well oriented during session       Physical Exam   Psychiatric: Her behavior is normal  Judgment and thought content normal

## 2019-10-09 NOTE — PATIENT INSTRUCTIONS
Reviewed boundaries and expectations to maintain with mother,  and daughter to limit stress  Reviewed coping strategies for anxiety and depression  Given her limited supports, will investigate womens group options offered by [facility] to rodriguez some social contact and emotional support  Will inform PCP of her struggles with energy and motivation  Offered additional sessions on a PRN basis

## 2019-10-12 ENCOUNTER — IMMUNIZATIONS (OUTPATIENT)
Dept: INTERNAL MEDICINE CLINIC | Facility: CLINIC | Age: 74
End: 2019-10-12
Payer: COMMERCIAL

## 2019-10-12 DIAGNOSIS — Z23 ENCOUNTER FOR IMMUNIZATION: ICD-10-CM

## 2019-10-12 PROCEDURE — G0008 ADMIN INFLUENZA VIRUS VAC: HCPCS

## 2019-10-12 PROCEDURE — 90662 IIV NO PRSV INCREASED AG IM: CPT

## 2019-10-15 ENCOUNTER — OFFICE VISIT (OUTPATIENT)
Dept: PODIATRY | Facility: CLINIC | Age: 74
End: 2019-10-15
Payer: COMMERCIAL

## 2019-10-15 VITALS — WEIGHT: 194 LBS | BODY MASS INDEX: 36.63 KG/M2 | HEIGHT: 61 IN

## 2019-10-15 DIAGNOSIS — E11.42 DIABETIC POLYNEUROPATHY ASSOCIATED WITH TYPE 2 DIABETES MELLITUS (HCC): ICD-10-CM

## 2019-10-15 DIAGNOSIS — L85.1 ACQUIRED KERATODERMA: ICD-10-CM

## 2019-10-15 DIAGNOSIS — B35.1 ONYCHOMYCOSIS: Primary | ICD-10-CM

## 2019-10-15 PROCEDURE — 11721 DEBRIDE NAIL 6 OR MORE: CPT | Performed by: PODIATRIST

## 2019-10-15 PROCEDURE — 11056 PARNG/CUTG B9 HYPRKR LES 2-4: CPT | Performed by: PODIATRIST

## 2019-10-15 RX ORDER — GLUCAGON 3 MG/1
POWDER NASAL
Refills: 0 | COMMUNITY
Start: 2019-10-08

## 2019-10-15 NOTE — PROGRESS NOTES
PATIENT:  Guido Lawson  1945    ASSESSMENT/PLAN:  1  Onychomycosis  Debridement   2  Acquired keratoderma  Lesion Destruction   3  Diabetic polyneuropathy associated with type 2 diabetes mellitus (HCC)                Disease prevention and related risk factors of diabetes were identified and discussed  The patient was educated in proper foot wear for diabetics  Also educated in daily foot assessment and routine diabetic foot care  Discussed the importance of controlling BS through diet and exercise  The patient will follow up in 9 weeks for further diabetic foot exam and care     PROCEDURE:  All mycotic toenails were reduced and debrided in length, width, and girth using a nail nipper and dremel  All hyperkeratotic skin lesion(s) were sharply pared with a scalpel with no bleeding or evidence of ulceration  Patient tolerated procedure(s) well without complications  HPI:  Guido Lawson is a 68 y  o year old female seen for diabetic foot exam   The patient has class findings and diabetic neuropathy  BS is under control  The patient complained of thick toenails and calluses causing pain  She has some numbness and paresthesia in her feet  The patient denied any acute pedal disorder, redness, acute swelling, or recent injury            PAST MEDICAL HISTORY:  Past Medical History:   Diagnosis Date    Acute embolism and thrombosis of unspecified deep veins of unspecified lower extremity (HCC)     Last Assessed:  5/18/17    Anemia     Anosmia     Anxiety     Arthritis     Asthma     Back pain     Bilateral macular retinal edema     CAD (coronary artery disease)     Cataract     Cervical disc herniation     Cervical radiculopathy     Cervical spinal stenosis     Cervical spondylolysis     Chronic mastoiditis     Complex endometrial hyperplasia     Depression     Diabetes mellitus (HCC)     DVT (deep venous thrombosis) (HCC)     Fibromyalgia     Hyperlipidemia     Hypertension     Hypothyroid     Lumbar radiculopathy     Obese     Spinal stenosis     Stomach ulcer     Thyroid disease        PAST SURGICAL HISTORY:  Past Surgical History:   Procedure Laterality Date    BACK SURGERY      CARPAL TUNNEL RELEASE      CATARACT EXTRACTION      CHOLECYSTECTOMY      COLONOSCOPY      CORONARY ANGIOPLASTY      CORONARY ARTERY BYPASS GRAFT      CYSTOSCOPY N/A 6/20/2017    Procedure: Thomas Wilson;  Surgeon: Shawn Jamison MD;  [facility];  Service: Gynecology Oncology    MS LAPAROSCOPY W TOT HYSTERECTUTERUS <=250 GRAM  W TUBE/OVARY N/A 6/20/2017    Procedure: ROBOTIC HYSTERECTOMY; BILATERAL SALPINO-OOPHERECTOMY; umbilical hernia repair ;  Surgeon: Shawn Jamison MD;  [facility];  Service: Gynecology Oncology    TONSILECTOMY AND ADNOIDECTOMY      UMBILICAL HERNIA REPAIR          ALLERGIES:  Molds & smuts;  Other; and Pollen extract    MEDICATIONS:  Current Outpatient Medications   Medication Sig Dispense Refill    albuterol (2 5 mg/3 mL) 0 083 % nebulizer solution Take 3 mL (2 5 mg total) by nebulization every 6 (six) hours as needed for wheezing 75 mL 0    albuterol (PROAIR HFA) 90 mcg/act inhaler Inhale 2 puffs every 6 (six) hours as needed       ALPRAZolam (XANAX) 0 5 mg tablet Take 1 tablet (0 5 mg total) by mouth 2 (two) times a day as needed for anxiety 60 tablet 1    aspirin 81 MG tablet Take 81 mg by mouth daily      BAQSIMI TWO PACK 3 MG/DOSE POWD Use as directed  0    cholecalciferol (VITAMIN D3) 1,000 units tablet Take 2,000 Units by mouth daily      Co-Enzyme Q-10 100 MG CAPS Take 1 capsule by mouth 2 (two) times a day        diclofenac sodium (VOLTAREN) 1 % Apply 2 g topically 4 (four) times a day (Patient taking differently: Apply 2 g topically as needed ) 1 Tube 0    diltiazem (TIAZAC) 300 MG 24 hr capsule TAKE 1 CAPSULE BY MOUTH EVERY DAY IN THE MORNING  2    DULoxetine (CYMBALTA) 60 mg delayed release capsule TAKE 1 CAPSULE BY MOUTH EVERY MORNING 30 capsule 0    enalapril (VASOTEC) 20 mg tablet Take 20 mg by mouth daily      fluticasone (FLONASE) 50 mcg/act nasal spray 2 sprays into each nostril daily 16 g 0    insulin aspart (NovoLOG) 100 units/mL injection Inject 12 Units under the skin 3 (three) times a day before meals (Patient taking differently: Inject under the skin 3 (three) times a day before meals )  0    Insulin Disposable Pump (OMNIPOD DASH 5 PACK) MISC CHANGE PODS EVERY 2 DAYS UTD  3    insulin glargine (LANTUS) 100 units/mL subcutaneous injection Inject 26 Units under the skin daily at bedtime      isosorbide mononitrate (IMDUR) 30 mg 24 hr tablet TAKE 1 TABLET DAILY AT BEDTIME, 30 DAYS, #30, 4 REFILLS, EXTENDED RELEASE   6    levothyroxine 50 mcg tablet Take 50 mcg by mouth daily      metFORMIN (GLUCOPHAGE) 500 mg tablet Take 500 mg by mouth 2 (two) times a day with meals      nitroglycerin (NITROLINGUAL) 0 4 mg/spray spray USE ONE SPRAY Q 5 MINUTES AS NEEDED FOR CHEST PAIN  IF NO RELIEF, CALL 911   1    rosuvastatin (CRESTOR) 20 MG tablet Take 20 mg by mouth every evening  2    tiZANidine (ZANAFLEX) 2 mg tablet Take 2 mg by mouth daily at bedtime       No current facility-administered medications for this visit          SOCIAL HISTORY:  Social History     Socioeconomic History    Marital status: /Civil Union     Spouse name: None    Number of children: 2    Years of education: High school or GED    Highest education level: None   Occupational History    Occupation: Retired   Social Needs    Financial resource strain: None    Food insecurity:     Worry: None     Inability: None    Transportation needs:     Medical: None     Non-medical: None   Tobacco Use    Smoking status: Former Smoker     Packs/day: 1 00     Years: 6 00     Pack years: 6 00     Types: Cigarettes     Last attempt to quit:      Years since quittin 8    Smokeless tobacco: Never Used    Tobacco comment: Smoked about a half a pack for about 4 yrs  Quite about 50 yrs ago  Substance and Sexual Activity    Alcohol use: No    Drug use: No    Sexual activity: Never     Comment: No known STD risk factors   Lifestyle    Physical activity:     Days per week: None     Minutes per session: None    Stress: None   Relationships    Social connections:     Talks on phone: None     Gets together: None     Attends [employer] service: None     Active member of club or organization: None     Attends meetings of clubs or organizations: None     Relationship status: None    Intimate partner violence:     Fear of current or ex partner: None     Emotionally abused: None     Physically abused: None     Forced sexual activity: None   Other Topics Concern    None   Social History Narrative    Lives independently with spouse    No known risk factors    Denied:  Exercising regularly        REVIEW OF SYSTEMS:  GENERAL: NAD, afebrile  HEART: No chest pain, or palpitation  RESPIRATORY:  No acute SOB or cough  GI: No Nausea, vomit or diarrhea  NEUROLOGIC: No syncope or acute weakness    PHYSICAL EXAM:  VASCULAR EXAM  Dorsalis pedis  +1, Posterior tibial artery  absent  The patient has class findings with skin atrophy, lack of digital hair, and nail dystrophy  There is +1 lower extremity edema bilaterally  Venous stasis skin changes noted BLE  NEUROLOGIC EXAM  Sensation is intact to light touch  Sensation is intact to 10gm monofilament  Decreased vibratory sensation in her feet  No focal neurologic deficit  DERMATOLOGIC EXAM:   No ulcer or cellulitis noted  The patient has hypertrophic toenails with discoloration, onycholysis, and subungal debris  No notable skin lesion  Patient has hyperkeratosis in bilateral submet 5  MUSCULOSKELETAL EXAM:   No acute joint pain, edema, or redness  No acute musculoskeletal problem  Patient has deformity including hammertoe

## 2019-10-17 ENCOUNTER — OFFICE VISIT (OUTPATIENT)
Dept: URGENT CARE | Age: 74
End: 2019-10-17
Payer: COMMERCIAL

## 2019-10-17 ENCOUNTER — APPOINTMENT (OUTPATIENT)
Dept: RADIOLOGY | Age: 74
End: 2019-10-17
Payer: COMMERCIAL

## 2019-10-17 VITALS
WEIGHT: 195 LBS | SYSTOLIC BLOOD PRESSURE: 148 MMHG | TEMPERATURE: 97.5 F | HEART RATE: 65 BPM | HEIGHT: 61 IN | RESPIRATION RATE: 18 BRPM | DIASTOLIC BLOOD PRESSURE: 66 MMHG | OXYGEN SATURATION: 97 % | BODY MASS INDEX: 36.82 KG/M2

## 2019-10-17 DIAGNOSIS — T14.90XA INJURY: ICD-10-CM

## 2019-10-17 DIAGNOSIS — M79.641 PAIN OF RIGHT HAND: ICD-10-CM

## 2019-10-17 DIAGNOSIS — S69.91XA INJURY OF RIGHT WRIST, INITIAL ENCOUNTER: Primary | ICD-10-CM

## 2019-10-17 PROCEDURE — 73130 X-RAY EXAM OF HAND: CPT

## 2019-10-17 PROCEDURE — 99213 OFFICE O/P EST LOW 20 MIN: CPT | Performed by: PHYSICIAN ASSISTANT

## 2019-10-17 PROCEDURE — 29125 APPL SHORT ARM SPLINT STATIC: CPT | Performed by: PHYSICIAN ASSISTANT

## 2019-10-17 PROCEDURE — 73110 X-RAY EXAM OF WRIST: CPT

## 2019-10-17 NOTE — PATIENT INSTRUCTIONS
RICE- rest, ice, compression, elevation  Wrist splint while awake for comfort  Call Ortho today and follow-up with them in the next 1-2 days for further evaluation and treatment     Call [facility] Youssef to schedule an appointment: [phone]  Or the direct Ortho number at [phone] to schedule an appointment   Go to the ER immediately if any numbness, tingling, weakness, change in intensity or location of pain, arm pain or swelling, or other new or concerning symptoms

## 2019-10-18 ENCOUNTER — OFFICE VISIT (OUTPATIENT)
Dept: PULMONOLOGY | Facility: CLINIC | Age: 74
End: 2019-10-18
Payer: COMMERCIAL

## 2019-10-18 ENCOUNTER — OFFICE VISIT (OUTPATIENT)
Dept: OBGYN CLINIC | Facility: HOSPITAL | Age: 74
End: 2019-10-18
Payer: COMMERCIAL

## 2019-10-18 VITALS
RESPIRATION RATE: 14 BRPM | TEMPERATURE: 98.3 F | HEART RATE: 66 BPM | DIASTOLIC BLOOD PRESSURE: 70 MMHG | OXYGEN SATURATION: 97 % | HEIGHT: 61 IN | SYSTOLIC BLOOD PRESSURE: 138 MMHG | WEIGHT: 195 LBS | BODY MASS INDEX: 36.82 KG/M2

## 2019-10-18 VITALS
HEART RATE: 80 BPM | DIASTOLIC BLOOD PRESSURE: 69 MMHG | HEIGHT: 62 IN | BODY MASS INDEX: 34.96 KG/M2 | SYSTOLIC BLOOD PRESSURE: 155 MMHG | WEIGHT: 190 LBS

## 2019-10-18 DIAGNOSIS — G47.33 OSA (OBSTRUCTIVE SLEEP APNEA): Primary | ICD-10-CM

## 2019-10-18 DIAGNOSIS — M79.641 PAIN OF RIGHT HAND: ICD-10-CM

## 2019-10-18 DIAGNOSIS — R91.8 PULMONARY NODULES: ICD-10-CM

## 2019-10-18 DIAGNOSIS — S63.501D RIGHT WRIST SPRAIN, SUBSEQUENT ENCOUNTER: Primary | ICD-10-CM

## 2019-10-18 DIAGNOSIS — J30.89 ALLERGIC RHINITIS DUE TO OTHER ALLERGIC TRIGGER, UNSPECIFIED SEASONALITY: ICD-10-CM

## 2019-10-18 DIAGNOSIS — S69.91XA INJURY OF RIGHT WRIST, INITIAL ENCOUNTER: ICD-10-CM

## 2019-10-18 PROCEDURE — 99213 OFFICE O/P EST LOW 20 MIN: CPT | Performed by: INTERNAL MEDICINE

## 2019-10-18 PROCEDURE — 99202 OFFICE O/P NEW SF 15 MIN: CPT | Performed by: SURGERY

## 2019-10-18 NOTE — LETTER
October 21, 2019     [address], [address] [address]    Patient: Makenzie Kilpatrick   YOB: 1945   Date of Visit: 10/18/2019       Dear Dr Nani Cooper: Thank you for referring Makenzie Kilpatrick to me for evaluation  Below are my notes for this consultation  If you have questions, please do not hesitate to call me  I look forward to following your patient along with you  Sincerely,        Isabel Barron DO        CC: No Recipients  Isabel Barron DO  10/21/2019  5:41 PM  Sign at close encounter  Progress note - Pulmonary Medicine   Makenzie Kilpatrick 68 y o  female MRN: [id number]       Impression & Plan:   61-year-old female with past medical history of DVT, diabetes, CAD, anemia, anxiety, cervical radiculopathy, fibromyalgia, and depression who comes in for follow-up of asthma, allergic rhinitis and pulmonary nodules      1  Somnolence -She is still on Xanax though she claims she reduced the frequency of use  I am not sure that I have penny this is completely true  I do not think Xanax is a good medication given age, comorbidites and previous falls  While I understand she claims severe anxiety and panic, I believe that there are several other choices that may help and I recommend she follow with a psychiatrist for that  -  I recommended she move forward with NIV therapy, while she states she is willing she has not done any of the requested work up  I do not have penny she will ever go back to using it  She continues to avoid it despite my explanation that it is contributing to sleepiness, cardiac disease among other complications        2    Pulmonary nodules in RML - stable over 2 years  Likely benign  No additional testing        3   Allergic rhinitis/seasonal allergies-history of anaphylactic shock with cats previously in the house   Previously doing allergy shots approximately 3 times a week       - She states that she feels fine  She has not using breo or her rescue inhaler frequency  She believes she is doing well  She does not seem to comply with many of my treatment suggestions        4   Moderate restrictive lung disease secondary to obesity- I again encouraged weight loss      She states she has lots of stressors in her life now and is unable to undergo the testing I recommended  We will revisit again in 6-12 months to see if she is willing to pursue the recommended treatment     ______________________________________________________________________    HPI:    Sarah Fry presents today for follow-up of her somnolence, allergic rhinitis and restrictive lung disease  She states that she has been feeling a bit better  She does not have significan issues with cough, runny nose or congestion  She also mentioned that she has cut back on some of her medication for anxiety and is more alert  She still does get sleepiness  However, she admits that she is not really interested in pursuit of many of these therapies  Review of Systems:  Review of Systems   HENT: Negative for congestion, postnasal drip and tinnitus  Respiratory: Negative for choking and shortness of breath  Gastrointestinal: Negative  Genitourinary: Negative  Musculoskeletal: Negative  Skin: Negative  Psychiatric/Behavioral: Positive for dysphoric mood and sleep disturbance  Negative for behavioral problems  Social history updates:  Social History     Tobacco Use   Smoking Status Former Smoker    Packs/day: 1 00    Years: 6 00    Pack years: 6 00    Types: Cigarettes    Last attempt to quit:     Years since quittin 8   Smokeless Tobacco Never Used   Tobacco Comment    Smoked about a half a pack for about 4 yrs  Quite about 50 yrs ago       Social History     Socioeconomic History    Marital status: /Civil Union     Spouse name: Not on file    Number of children: 2    Years of education: High school or GED  Highest education level: Not on file   Occupational History    Occupation: Retired   Social Needs    Financial resource strain: Not on file    Food insecurity:     Worry: Not on file     Inability: Not on file   Earnix needs:     Medical: Not on file     Non-medical: Not on file   Tobacco Use    Smoking status: Former Smoker     Packs/day: 1 00     Years: 6 00     Pack years: 6 00     Types: Cigarettes     Last attempt to quit:      Years since quittin 8    Smokeless tobacco: Never Used    Tobacco comment: Smoked about a half a pack for about 4 yrs  Quite about 50 yrs ago     Substance and Sexual Activity    Alcohol use: No    Drug use: No    Sexual activity: Never     Comment: No known STD risk factors   Lifestyle    Physical activity:     Days per week: Not on file     Minutes per session: Not on file    Stress: Not on file   Relationships    Social connections:     Talks on phone: Not on file     Gets together: Not on file     Attends [employer] service: Not on file     Active member of club or organization: Not on file     Attends meetings of clubs or organizations: Not on file     Relationship status: Not on file    Intimate partner violence:     Fear of current or ex partner: Not on file     Emotionally abused: Not on file     Physically abused: Not on file     Forced sexual activity: Not on file   Other Topics Concern    Not on file   Social History Narrative    Lives independently with spouse    No known risk factors    Denied:  Exercising regularly       PhysicalExamination:  Vitals:   /70   Pulse 66   Temp 98 3 °F (36 8 °C)   Resp 14   Ht 5' 1" (1 549 m)   Wt 88 5 kg (195 lb)   SpO2 97%   BMI 36 84 kg/m²    General: Pleasant but somnolent, Awake alert and oriented x 3, conversant without conversational dyspnea, NAD, normal affect  HEENT:  PERRL, Sclera noninjected, nonicteric OU, Nares patent,  no craniofacial abnormalities, Mucous membranes, moist, no oral lesions, normal dentition  NECK: Trachea midline, no accessory muscle use, no stridor, no cervical or supraclavicular adenopathy, JVP not elevated  CARDIAC: Reg, single s1/S2, no m/r/g  PULM: CTA bilaterally no wheezing, rhonchi or rales  ABD: Normoactive bowel sounds, soft nontender, nondistended, no rebound, no rigidity, no guarding  EXT: No cyanosis, no clubbing, no edema, normal capillary refill  NEURO: no focal neurologic deficits, AAOx3, moving all extremities appropriately      Diagnostic Data:  Labs: I personally reviewed the most recent laboratory data pertinent to today's visit  I have personally reviewed pertinent lab results  Lab Results   Component Value Date    WBC 7 85 04/24/2018    HGB 9 9 (L) 04/24/2018    HCT 30 6 (L) 04/24/2018    MCV 82 04/24/2018     04/24/2018     Lab Results   Component Value Date    GLUCOSE 248 (H) 11/09/2015    CALCIUM 9 2 04/24/2018     11/16/2017    K 5 6 (H) 04/24/2018    CO2 22 04/24/2018     04/24/2018    BUN 23 04/24/2018    CREATININE 1 42 (H) 04/24/2018     No results found for: IGE  Lab Results   Component Value Date    ALT 31 04/24/2018    AST 26 04/24/2018    ALKPHOS 129 (H) 04/24/2018    BILITOT 0 5 11/16/2017       PFT results: The most recent pulmonary function tests were reviewed  Simona Olivera  5/22/18  Spirometry:  FEV1/FVC Ratio is 78%   FEV1 is 70% predicted   FVC is 67% predicted  Flow volume loop:  Normal  IMPRESSION:  · Spirometry demonstrates mild reduction in vital capacity which may be on the basis of patient effort, body habitus, restriction or air trapping   Lung volumes would be helpful for further evaluation     In office yen  5/24/17 Spirometry: Forced vital capacity: 1 [id number]% Predicted Values  Forced expiratory volume in one second: 1 05RPTf895% Predicted Value  FEV1/FVC ratio is 77%  PFT Interpretation: This study shows a mild reduction in the patient's forced vital capacity   There is no evidence of airflow obstruction     Imaging:  I personally reviewed the images on the [facility] system pertinent to today's visit  CT 05/21/2018-FINDINGS:  LUNGS:  Stable 2 to 3 mm right middle lobe pulmonary nodule is noted series 2 image 28   Stable 6 mm right middle lobe pulmonary nodule is noted series 2 image 30   No new nodules or masses are seen   Minimal linear scarring is again identified within   the left lower lobe      PLEURA:  Unremarkable      HEART/GREAT VESSELS:  Patient is status post coronary artery bypass grafting      MEDIASTINUM AND SAMANTHA:  Unremarkable        CT chest November 2017  FINDINGS:  LUNGS:  Stable 6 mm right middle lobe pulmonary nodule (series 3 image 30)   Adjacent 4 mm right middle lobe nodule (series 3 image 27) there is bibasilar dependent atelectasis   There is linear scarring versus atelectasis at the left lung base   In   retrospect is also stable    PLEURA:  Unremarkable      CT chest June 2017   IMPRESSION:  Stable 6 mm right middle lobe pulmonary nodule   Continued follow-up recommended as described on      Jhonny Chavarria DO

## 2019-10-18 NOTE — PROGRESS NOTES
Tika OREILLY  Attending, Orthopaedic Surgery  Hand, Wrist, and Elbow Surgery  [facility]      ORTHOPAEDIC HAND, WRIST, AND ELBOW OFFICE  VISIT       ASSESSMENT/PLAN:      67 yo female with right wrist pain, likely sprain  DOI 10/12/19  X-rays taken at Urgent Care show no acute fracture or dislocation  Patient exhibits improving wrist pain but does have some stiffness with wrist range of motion  She should continue wearing her wrist brace more at night than during the day  Encouraged use of Voltaren gel as well for topical pain relief since she has a history of diabetes and renal issues and therefore cannot take steroids or oral anti-inflammatories  She has also shown active range of motion exercises in office today which she should continue at home  Would like to see her back in about 2-3 weeks for re-evaluation  The patient verbalized understanding of exam findings and treatment plan  We engaged in the shared decision-making process and treatment options were discussed at length with the patient  Surgical and conservative management discussed today along with risks and benefits  Diagnoses and all orders for this visit:    Right wrist sprain, subsequent encounter    Injury of right wrist, initial encounter  -     Ambulatory referral to Orthopedic Surgery  -     diclofenac sodium (VOLTAREN) 1 %; Apply 2 g topically 4 (four) times a day    Pain of right hand  -     Ambulatory referral to Orthopedic Surgery      Follow Up:  Return in about 3 weeks (around 11/8/2019) for Recheck      To Do Next Visit:  Re-evaluation of current issue    ____________________________________________________________________________________________________________________________________________      CHIEF COMPLAINT:  Chief Complaint   Patient presents with    Right Hand - Pain       SUBJECTIVE:  Laura Stout is a 68y o  year old RHD female with history of DM and various other medical issues who presents for evaluation of a right wrist injury that occurred on Saturday, about 6 days ago  She states she was trying to step on a lantern fly when she lost her footing and fell between the bushes and pumpkins  Patient states that she was stuck in that position for upwards of 10 minutes before someone helped her to get up  In the meantime she atempted to get up on her own, injuring her right hand  She was seen at the urgent care several days later, x-rays were negative for x-ray but she was placed in a wrist brace  Currently patient notes pain on the dorsal radial side of her right wrist that has been gradually improving since the incident  She does note some stiffness with wrist range of motion  No numbness or tingling        Pain/symptom timing:  Worse during the day when active  Pain/symptom context:  Worse with activites and work  Pain/symptom modifying factors:  Rest makes better, activities make worse  Pain/symptom associated signs/symptoms: none    Prior treatment   · NSAIDsYes   · Injections No   · Bracing/Orthotics Yes    Physical Therapy No     I have personally reviewed all the relevant PMH, PSH, SH, FH, Medications and allergies      PAST MEDICAL HISTORY:  Past Medical History:   Diagnosis Date    Acute embolism and thrombosis of unspecified deep veins of unspecified lower extremity (HCC)     Last Assessed:  5/18/17    Anemia     Anosmia     Anxiety     Arthritis     Asthma     Back pain     Bilateral macular retinal edema     CAD (coronary artery disease)     Cataract     Cervical disc herniation     Cervical radiculopathy     Cervical spinal stenosis     Cervical spondylolysis     Chronic mastoiditis     Complex endometrial hyperplasia     Depression     Diabetes mellitus ([facility] [address] [facility]     DVT (deep venous thrombosis) (HCC)     Fibromyalgia     Hyperlipidemia     Hypertension     Hypothyroid     Lumbar radiculopathy     Obese     Spinal stenosis     Stomach ulcer     Thyroid disease        PAST SURGICAL HISTORY:  Past Surgical History:   Procedure Laterality Date    BACK SURGERY      CARPAL TUNNEL RELEASE      CATARACT EXTRACTION      CHOLECYSTECTOMY      COLONOSCOPY      CORONARY ANGIOPLASTY      CORONARY ARTERY BYPASS GRAFT      CYSTOSCOPY N/A 2017    Procedure: Kelsey Sultana;  Surgeon: Brigette Gillespie MD;  Location: [facility];  Service: Gynecology Oncology    SD LAPAROSCOPY W TOT HYSTERECTUTERUS <=250 GRAM  W TUBE/OVARY N/A 2017    Procedure: ROBOTIC HYSTERECTOMY; BILATERAL SALPINO-OOPHERECTOMY; umbilical hernia repair ;  Surgeon: Brigette Gillespie MD;  Location[facility];  Service: Gynecology Oncology    TONSILECTOMY AND ADNOIDECTOMY      UMBILICAL HERNIA REPAIR         FAMILY HISTORY:  Family History   Problem Relation Age of Onset    Arthritis Mother     Leukemia Mother     Other Mother         Anxiety, major depressive disorder, recurrent episode with atypical features    Coronary artery disease Father         Heart problem    Diabetes Father     Other Father         Infectious disease    Alzheimer's disease Maternal Grandmother     Other Maternal Grandfather         Heart problem    Other Daughter         Anxiety, major depressive disorder, recurrent episode with atypical features    Alcohol abuse Other         Grandparent    Cancer Family     Diabetes Family     Hypertension Family     Other Family         Reported prior back trouble, thyroid disorder       SOCIAL HISTORY:  Social History     Tobacco Use    Smoking status: Former Smoker     Packs/day: 1 00     Years: 6 00     Pack years: 6 00     Types: Cigarettes     Last attempt to quit:      Years since quittin 8    Smokeless tobacco: Never Used    Tobacco comment: Smoked about a half a pack for about 4 yrs  Quite about 50 yrs ago     Substance Use Topics    Alcohol use: No    Drug use: No       MEDICATIONS:    Current Outpatient Medications:     albuterol (2 5 mg/3 mL) 0 083 % nebulizer solution, Take 3 mL (2 5 mg total) by nebulization every 6 (six) hours as needed for wheezing, Disp: 75 mL, Rfl: 0    albuterol (PROAIR HFA) 90 mcg/act inhaler, Inhale 2 puffs every 6 (six) hours as needed , Disp: , Rfl:     ALPRAZolam (XANAX) 0 5 mg tablet, Take 1 tablet (0 5 mg total) by mouth 2 (two) times a day as needed for anxiety, Disp: 60 tablet, Rfl: 1    aspirin 81 MG tablet, Take 81 mg by mouth daily, Disp: , Rfl:     BAQSIMI TWO PACK 3 MG/DOSE POWD, Use as directed, Disp: , Rfl: 0    cholecalciferol (VITAMIN D3) 1,000 units tablet, Take 2,000 Units by mouth daily, Disp: , Rfl:     Co-Enzyme Q-10 100 MG CAPS, Take 1 capsule by mouth 2 (two) times a day  , Disp: , Rfl:     diclofenac sodium (VOLTAREN) 1 %, Apply 2 g topically 4 (four) times a day (Patient taking differently: Apply 2 g topically as needed ), Disp: 1 Tube, Rfl: 0    diltiazem (TIAZAC) 300 MG 24 hr capsule, TAKE 1 CAPSULE BY MOUTH EVERY DAY IN THE MORNING, Disp: , Rfl: 2    DULoxetine (CYMBALTA) 60 mg delayed release capsule, TAKE 1 CAPSULE BY MOUTH EVERY MORNING, Disp: 30 capsule, Rfl: 0    enalapril (VASOTEC) 20 mg tablet, Take 20 mg by mouth daily, Disp: , Rfl:     fluticasone (FLONASE) 50 mcg/act nasal spray, 2 sprays into each nostril daily, Disp: 16 g, Rfl: 0    insulin aspart (NovoLOG) 100 units/mL injection, Inject 12 Units under the skin 3 (three) times a day before meals (Patient taking differently: Inject under the skin 3 (three) times a day before meals ), Disp: , Rfl: 0    Insulin Disposable Pump (OMNIPOD DASH 5 PACK) MISC, CHANGE PODS EVERY 2 DAYS UTD, Disp: , Rfl: 3    isosorbide mononitrate (IMDUR) 30 mg 24 hr tablet, TAKE 1 TABLET DAILY AT BEDTIME, 30 DAYS, #30, 4 REFILLS, EXTENDED RELEASE , Disp: , Rfl: 6    levothyroxine 50 mcg tablet, Take 50 mcg by mouth daily, Disp: , Rfl:     metFORMIN (GLUCOPHAGE) 500 mg tablet, Take 500 mg by mouth 2 (two) times a day with meals, Disp: , Rfl:   nitroglycerin (NITROLINGUAL) 0 4 mg/spray spray, USE ONE SPRAY Q 5 MINUTES AS NEEDED FOR CHEST PAIN  IF NO RELIEF, CALL 911 , Disp: , Rfl: 1    rosuvastatin (CRESTOR) 20 MG tablet, Take 20 mg by mouth every evening, Disp: , Rfl: 2    tiZANidine (ZANAFLEX) 2 mg tablet, Take 2 mg by mouth daily at bedtime, Disp: , Rfl:     diclofenac sodium (VOLTAREN) 1 %, Apply 2 g topically 4 (four) times a day, Disp: 1 Tube, Rfl: 1    ALLERGIES:  Allergies   Allergen Reactions    Molds & Smuts     Other      RAGWEED, CAT DANDER, DOG DANDER     Pollen Extract Other (See Comments)     Cold symptoms             REVIEW OF SYSTEMS:  Review of Systems   Constitutional: Negative for chills, diaphoresis, fatigue and fever  HENT: Negative for congestion, facial swelling, hearing loss, sore throat, trouble swallowing and voice change  Respiratory: Negative for apnea, cough, shortness of breath, wheezing and stridor  Cardiovascular: Negative for chest pain, palpitations and leg swelling  Gastrointestinal: Negative for abdominal pain, constipation, nausea and vomiting  Endocrine: Negative for cold intolerance and heat intolerance  Musculoskeletal: Positive for arthralgias and myalgias  Negative for gait problem and joint swelling  Skin: Negative for color change, pallor, rash and wound  Neurological: Negative for tremors, speech difficulty, weakness and numbness  Psychiatric/Behavioral: Negative for agitation, confusion, decreased concentration and hallucinations  The patient is not nervous/anxious          VITALS:  Vitals:    10/18/19 1325   BP: 155/69   Pulse: 80       LABS:  HgA1c:   Lab Results   Component Value Date    HGBA1C 8 8 08/13/2018     BMP:   Lab Results   Component Value Date    GLUCOSE 248 (H) 11/09/2015    CALCIUM 9 2 04/24/2018     11/16/2017    K 5 6 (H) 04/24/2018    CO2 22 04/24/2018     04/24/2018    BUN 23 04/24/2018    CREATININE 1 42 (H) 04/24/2018 _____________________________________________________  PHYSICAL EXAMINATION:  General: well developed and well nourished, alert, oriented times 3 and appears comfortable  Psychiatric: Normal  HEENT: Normocephalic, Atraumatic Trachea Midline, No torticollis  Pulmonary: No audible wheezing or respiratory distress   Cardiovascular: No pitting edema, 2+ radial pulse   Skin: No masses, erythema, lacerations, fluctation, ulcerations  Neurovascular: Sensation Intact to the Median, Ulnar, Radial Nerve, Motor Intact to the Median, Ulnar, Radial Nerve and Pulses Intact  Musculoskeletal: Normal, except as noted in detailed exam and in HPI  MUSCULOSKELETAL EXAMINATION:      ___________________________________________________  STUDIES REVIEWED:  I have personally reviewed AP lateral and oblique radiographs of the right wrist and right hand   which demonstrate moderate arthritic changes noted throughout the hand and fingers including the CMC joint  No acute fractures or dislocation noted              PROCEDURES PERFORMED:  Procedures  No Procedures performed today    _____________________________________________________    Harry Mclaughlin    I,:    am acting as a scribe while in the presence of the attending physician :        I,:    personally performed the services described in this documentation    as scribed in my presence :

## 2019-10-21 PROBLEM — J98.4 RESTRICTIVE LUNG DISEASE SECONDARY TO OBESITY: Status: ACTIVE | Noted: 2019-10-21

## 2019-10-21 PROBLEM — E66.9 RESTRICTIVE LUNG DISEASE SECONDARY TO OBESITY: Status: ACTIVE | Noted: 2019-10-21

## 2019-10-21 NOTE — PROGRESS NOTES
Progress note - Pulmonary Medicine   Izzy Lucio 68 y o  female MRN: [id number]       Impression & Plan:   81-year-old female with past medical history of DVT, diabetes, CAD, anemia, anxiety, cervical radiculopathy, fibromyalgia, and depression who comes in for follow-up of asthma, allergic rhinitis and pulmonary nodules      1  Somnolence -She is still on Xanax though she claims she reduced the frequency of use  I am not sure that I have penny this is completely true  I do not think Xanax is a good medication given age, comorbidites and previous falls  While I understand she claims severe anxiety and panic, I believe that there are several other choices that may help and I recommend she follow with a psychiatrist for that  -  I recommended she move forward with NIV therapy, while she states she is willing she has not done any of the requested work up  I do not have penny she will ever go back to using it  She continues to avoid it despite my explanation that it is contributing to sleepiness, cardiac disease among other complications        2    Pulmonary nodules in RML - stable over 2 years  Likely benign  No additional testing        3   Allergic rhinitis/seasonal allergies-history of anaphylactic shock with cats previously in the house   Previously doing allergy shots approximately 3 times a week       - She states that she feels fine  She has not using breo or her rescue inhaler frequency  She believes she is doing well  She does not seem to comply with many of my treatment suggestions        4   Moderate restrictive lung disease secondary to obesity- I again encouraged weight loss      She states she has lots of stressors in her life now and is unable to undergo the testing I recommended    We will revisit again in 6-12 months to see if she is willing to pursue the recommended treatment     ______________________________________________________________________    HPI:    Izzy Velazcobritney presents today for follow-up of her somnolence, allergic rhinitis and restrictive lung disease  She states that she has been feeling a bit better  She does not have significan issues with cough, runny nose or congestion  She also mentioned that she has cut back on some of her medication for anxiety and is more alert  She still does get sleepiness  However, she admits that she is not really interested in pursuit of many of these therapies  Review of Systems:  Review of Systems   HENT: Negative for congestion, postnasal drip and tinnitus  Respiratory: Negative for choking and shortness of breath  Gastrointestinal: Negative  Genitourinary: Negative  Musculoskeletal: Negative  Skin: Negative  Psychiatric/Behavioral: Positive for dysphoric mood and sleep disturbance  Negative for behavioral problems  Social history updates:  Social History     Tobacco Use   Smoking Status Former Smoker    Packs/day: 1 00    Years: 6 00    Pack years: 6 00    Types: Cigarettes    Last attempt to quit:     Years since quittin 8   Smokeless Tobacco Never Used   Tobacco Comment    Smoked about a half a pack for about 4 yrs  Quite about 50 yrs ago       Social History     Socioeconomic History    Marital status: /Civil Union     Spouse name: Not on file    Number of children: 2    Years of education: High school or GED    Highest education level: Not on file   Occupational History    Occupation: Retired   Social Needs    Financial resource strain: Not on file    Food insecurity:     Worry: Not on file     Inability: Not on file   Nor1 needs:     Medical: Not on file     Non-medical: Not on file   Tobacco Use    Smoking status: Former Smoker     Packs/day: 1 00     Years: 6 00     Pack years: 6 00     Types: Cigarettes     Last attempt to quit:      Years since quittin 8    Smokeless tobacco: Never Used    Tobacco comment: Smoked about a half a pack for about 4 yrs  Quite about 50 yrs ago  Substance and Sexual Activity    Alcohol use: No    Drug use: No    Sexual activity: Never     Comment: No known STD risk factors   Lifestyle    Physical activity:     Days per week: Not on file     Minutes per session: Not on file    Stress: Not on file   Relationships    Social connections:     Talks on phone: Not on file     Gets together: Not on file     Attends Scientologist service: Not on file     Active member of club or organization: Not on file     Attends meetings of clubs or organizations: Not on file     Relationship status: Not on file    Intimate partner violence:     Fear of current or ex partner: Not on file     Emotionally abused: Not on file     Physically abused: Not on file     Forced sexual activity: Not on file   Other Topics Concern    Not on file   Social History Narrative    Lives independently with spouse    No known risk factors    Denied:  Exercising regularly       PhysicalExamination:  Vitals:   /70   Pulse 66   Temp 98 3 °F (36 8 °C)   Resp 14   Ht 5' 1" (1 549 m)   Wt 88 5 kg (195 lb)   SpO2 97%   BMI 36 84 kg/m²   General: Pleasant but somnolent, Awake alert and oriented x 3, conversant without conversational dyspnea, NAD, normal affect  HEENT:  PERRL, Sclera noninjected, nonicteric OU, Nares patent,  no craniofacial abnormalities, Mucous membranes, moist, no oral lesions, normal dentition  NECK: Trachea midline, no accessory muscle use, no stridor, no cervical or supraclavicular adenopathy, JVP not elevated  CARDIAC: Reg, single s1/S2, no m/r/g  PULM: CTA bilaterally no wheezing, rhonchi or rales  ABD: Normoactive bowel sounds, soft nontender, nondistended, no rebound, no rigidity, no guarding  EXT: No cyanosis, no clubbing, no edema, normal capillary refill  NEURO: no focal neurologic deficits, AAOx3, moving all extremities appropriately      Diagnostic Data:  Labs:   I personally reviewed the most recent laboratory data pertinent to today's visit  I have personally reviewed pertinent lab results  Lab Results   Component Value Date    WBC 7 85 04/24/2018    HGB 9 9 (L) 04/24/2018    HCT 30 6 (L) 04/24/2018    MCV 82 04/24/2018     04/24/2018     Lab Results   Component Value Date    GLUCOSE 248 (H) 11/09/2015    CALCIUM 9 2 04/24/2018     11/16/2017    K 5 6 (H) 04/24/2018    CO2 22 04/24/2018     04/24/2018    BUN 23 04/24/2018    CREATININE 1 42 (H) 04/24/2018     No results found for: IGE  Lab Results   Component Value Date    ALT 31 04/24/2018    AST 26 04/24/2018    ALKPHOS 129 (H) 04/24/2018    BILITOT 0 5 11/16/2017       PFT results: The most recent pulmonary function tests were reviewed  Sruthi Paulino  5/22/18  Spirometry:  FEV1/FVC Ratio is 78%   FEV1 is 70% predicted   FVC is 67% predicted  Flow volume loop:  Normal  IMPRESSION:  · Spirometry demonstrates mild reduction in vital capacity which may be on the basis of patient effort, body habitus, restriction or air trapping   Lung volumes would be helpful for further evaluation     In office yen  5/24/17 Spirometry: Forced vital capacity: 1 [id number]% Predicted Values  Forced expiratory volume in one second: 1 02DLFf580% Predicted Value  FEV1/FVC ratio is 77%  PFT Interpretation: This study shows a mild reduction in the patient's forced vital capacity  There is no evidence of airflow obstruction     Imaging:  I personally reviewed the images on the [facility] system pertinent to today's visit  CT 05/21/2018-FINDINGS:  LUNGS:  Stable 2 to 3 mm right middle lobe pulmonary nodule is noted series 2 image 28   Stable 6 mm right middle lobe pulmonary nodule is noted series 2 image 30   No new nodules or masses are seen   Minimal linear scarring is again identified within   the left lower lobe      PLEURA:  Unremarkable      HEART/GREAT VESSELS:  Patient is status post coronary artery bypass grafting      MEDIASTINUM AND SAMANTHA:  Unremarkable        CT chest November 2017  FINDINGS:  LUNGS:  Stable 6 mm right middle lobe pulmonary nodule (series 3 image 30)   Adjacent 4 mm right middle lobe nodule (series 3 image 27) there is bibasilar dependent atelectasis   There is linear scarring versus atelectasis at the left lung base   In   retrospect is also stable    PLEURA:  Unremarkable      CT chest June 2017   IMPRESSION:  Stable 6 mm right middle lobe pulmonary nodule   Continued follow-up recommended as described on      Marilyn Helton DO

## 2019-11-08 DIAGNOSIS — F41.9 ANXIETY: ICD-10-CM

## 2019-11-08 RX ORDER — DULOXETIN HYDROCHLORIDE 60 MG/1
CAPSULE, DELAYED RELEASE ORAL
Qty: 30 CAPSULE | Refills: 0 | Status: SHIPPED | OUTPATIENT
Start: 2019-11-08 | End: 2019-11-30 | Stop reason: SDUPTHER

## 2019-11-13 ENCOUNTER — OFFICE VISIT (OUTPATIENT)
Dept: INTERNAL MEDICINE CLINIC | Facility: CLINIC | Age: 74
End: 2019-11-13
Payer: COMMERCIAL

## 2019-11-13 ENCOUNTER — SOCIAL WORK (OUTPATIENT)
Dept: BEHAVIORAL/MENTAL HEALTH CLINIC | Facility: CLINIC | Age: 74
End: 2019-11-13
Payer: COMMERCIAL

## 2019-11-13 VITALS
SYSTOLIC BLOOD PRESSURE: 148 MMHG | DIASTOLIC BLOOD PRESSURE: 64 MMHG | BODY MASS INDEX: 35.63 KG/M2 | HEIGHT: 62 IN | OXYGEN SATURATION: 96 % | WEIGHT: 193.6 LBS | HEART RATE: 73 BPM | RESPIRATION RATE: 16 BRPM

## 2019-11-13 DIAGNOSIS — E11.8 TYPE 2 DIABETES MELLITUS WITH COMPLICATION, WITH LONG-TERM CURRENT USE OF INSULIN (HCC): Chronic | ICD-10-CM

## 2019-11-13 DIAGNOSIS — F41.9 ANXIETY: ICD-10-CM

## 2019-11-13 DIAGNOSIS — Z79.4 TYPE 2 DIABETES MELLITUS WITH COMPLICATION, WITH LONG-TERM CURRENT USE OF INSULIN (HCC): Chronic | ICD-10-CM

## 2019-11-13 DIAGNOSIS — F41.9 ANXIETY AND DEPRESSION: Primary | ICD-10-CM

## 2019-11-13 DIAGNOSIS — I10 ESSENTIAL HYPERTENSION: Chronic | ICD-10-CM

## 2019-11-13 DIAGNOSIS — E03.9 HYPOTHYROIDISM, UNSPECIFIED TYPE: Primary | Chronic | ICD-10-CM

## 2019-11-13 DIAGNOSIS — N18.30 CHRONIC RENAL IMPAIRMENT, STAGE 3 (MODERATE) (HCC): Chronic | ICD-10-CM

## 2019-11-13 DIAGNOSIS — F32.A ANXIETY AND DEPRESSION: Primary | ICD-10-CM

## 2019-11-13 PROCEDURE — 90832 PSYTX W PT 30 MINUTES: CPT | Performed by: SOCIAL WORKER

## 2019-11-13 PROCEDURE — 3066F NEPHROPATHY DOC TX: CPT | Performed by: INTERNAL MEDICINE

## 2019-11-13 PROCEDURE — 99214 OFFICE O/P EST MOD 30 MIN: CPT | Performed by: INTERNAL MEDICINE

## 2019-11-13 NOTE — PROGRESS NOTES
Falls Plan of Care: balance, strength, and gait training instructions were provided  She will consider joining the [facility]    Diabetic Foot Exam    Patient's shoes and socks removed  Right Foot/Ankle   Right Foot Inspection  Skin Exam: skin normal and skin intact no dry skin, no warmth, no callus, no erythema, no maceration, no abnormal color, no pre-ulcer, no ulcer and no callus                          Toe Exam: ROM and strength within normal limits  Sensory       Monofilament testing: diminished  Vascular    The right DP pulse is 2+  The right PT pulse is 2+  Left Foot/Ankle  Left Foot Inspection  Skin Exam: skin normal and skin intactno dry skin, no warmth, no erythema, no maceration, normal color, no pre-ulcer, no ulcer and no callus                         Toe Exam: ROM and strength within normal limits                   Sensory       Monofilament: intact  Vascular    The left DP pulse is 2+  The left PT pulse is 2+  Assign Risk Category:  No deformity present; Loss of protective sensation; No weak pulses       Risk: 1    Assessment/Plan:    Hypothyroid  Euthyroid continue with levothyroxine 50 mcg once daily    Type 2 diabetes mellitus with complication, with long-term current use of insulin ([facility])    Lab Results   Component Value Date    HGBA1C 8 8 08/13/2018   Target A1c under 7 I have counselled the pt to follow a healthy and balanced diet ,and recommend routine exercise  Currently on the insulin pump working with Endocrinology annual eye examination recommended foot examination completed I will be ordering diabetic laboratories including comprehensive metabolic panel, hemoglobin A1c, urine microalbumin, lipid panel      Essential hypertension  Increased she is under a lot of anxiety today causing her blood pressure to be elevated for now will continue monitor she will follow up Cardiology she reports me that cardiologist recently decrease her blood pressure pills because concerned about low blood pressure dizziness, she will discuss this further with Cardiology  Chronic renal insufficiency  Drink adequate amount of water, avoid anti-inflammatories, reduce sodium in the diet and will continue to monitor the kidney function    Anxiety  Significant anxiety no SI related to multiple family issues would like her to see Psychiatry and also counselor Linda Hu today  She will continue with current medical regiment         Problem List Items Addressed This Visit        Endocrine    Type 2 diabetes mellitus with complication, with long-term current use of insulin ([address] ) (Chronic)       Lab Results   Component Value Date    HGBA1C 8 8 08/13/2018   Target A1c under 7 I have counselled the pt to follow a healthy and balanced diet ,and recommend routine exercise  Currently on the insulin pump working with Endocrinology annual eye examination recommended foot examination completed I will be ordering diabetic laboratories including comprehensive metabolic panel, hemoglobin A1c, urine microalbumin, lipid panel  Relevant Orders    Diabetic foot exam    Comprehensive metabolic panel    Hemoglobin A1C    Lipid Panel with Direct LDL reflex    Microalbumin / creatinine urine ratio    Hypothyroid - Primary (Chronic)     Euthyroid continue with levothyroxine 50 mcg once daily            Cardiovascular and Mediastinum    Essential hypertension (Chronic)     Increased she is under a lot of anxiety today causing her blood pressure to be elevated for now will continue monitor she will follow up Cardiology she reports me that cardiologist recently decrease her blood pressure pills because concerned about low blood pressure dizziness, she will discuss this further with Cardiology              Genitourinary    Chronic renal insufficiency (Chronic)     Drink adequate amount of water, avoid anti-inflammatories, reduce sodium in the diet and will continue to monitor the kidney function            Other Anxiety (Chronic)     Significant anxiety no SI related to multiple family issues would like her to see Psychiatry and also counselor Renetta Reyes today  She will continue with current medical regiment         Relevant Orders    Ambulatory referral to Psychiatry          Return to office 4  months  call if any problems  Subjective:      Patient ID: Ayad Martin is a 68 y o  female  HPI 71-year old female coming in for a follow up office visit regarding anxiety, hypothyroidism, type 2 diabetes, hypertension, chronic renal insufficiency stage III; The patient reports me compliant taking medications without untoward side effects the  The patient is here to review his medical condition, update me on the medical condition and the patient reports me no hospitalizations and no ER visits  Stress with mother in  [address] no si no insomnia, last Friday cousin of her mother  in the nursing home  mother did recognize her, mother has dementia; all I can think about is loxing mom full of guilt;  with forgetfullness and depressed at night , the pt is trying to hold everything together, daughter filing for bankrupsy, pt needs to lend money to her, she is now on the diabetes pump   Pt wants to see counsellor Epi Rodas  No SI  The following portions of the patient's history were reviewed and updated as appropriate: allergies, current medications, past family history, past medical history, past social history, past surgical history and problem list     Review of Systems   Constitutional: Negative for activity change, appetite change and unexpected weight change  HENT: Negative for congestion and postnasal drip  Eyes: Negative for visual disturbance  Respiratory: Negative for cough and shortness of breath  Cardiovascular: Negative for chest pain  Gastrointestinal: Negative for abdominal pain, diarrhea, nausea and vomiting  Neurological: Negative for dizziness, light-headedness and headaches  Psychiatric/Behavioral: Negative for suicidal ideas  The patient is nervous/anxious  Objective:    No follow-ups on file  Procedure: Xr Wrist 3+ Vw Right    Result Date: 10/17/2019  Narrative: RIGHT WRIST INDICATION:   T14 90XA: Injury, unspecified, initial encounter  COMPARISON:  None VIEWS:  XR WRIST 3+ VW RIGHT FINDINGS: There is no acute fracture or dislocation  Faint sclerotic density in the distal radius appears chronic  Degenerative changes of the 1st and 2nd carpal metacarpal [place]) articulation  No lytic or blastic lesions are seen  Soft tissues are unremarkable  Impression: No acute osseous abnormality  Workstation performed: [id number]     Procedure: Xr Hand 3+ Vw Right    Result Date: 10/17/2019  Narrative: RIGHT HAND INDICATION:   T14 90XA: Injury, unspecified, initial encounter  COMPARISON:  None VIEWS:  XR HAND 3+ VW RIGHT For the purposes of institution wide universal language the following terms will apply: (thumb=1st digit/finger, index finger=2nd digit/finger, long finger=3rd digit/finger, ring=4th digit/finger and small finger=5th digit/finger) FINDINGS: There is no acute fracture or dislocation  Faint sclerotic density in the distal radius appears chronic  Degenerative changes of the 1st and 2nd carpal metacarpal [place]) articulation  No lytic or blastic lesions are seen  Soft tissues are unremarkable  Impression: No acute osseous abnormality   Workstation performed: [id number]         Allergies   Allergen Reactions    Molds & Smuts     Other      RAGWEED, CAT DANDER, DOG DANDER     Pollen Extract Other (See Comments)     Cold symptoms         Past Medical History:   Diagnosis Date    Acute embolism and thrombosis of unspecified deep veins of unspecified lower extremity (HCC)     Last Assessed:  5/18/17    Anemia     Anosmia     Anxiety     Arthritis     Asthma     Back pain     Bilateral macular retinal edema     CAD (coronary artery disease)     Cataract     Cervical disc herniation     Cervical radiculopathy     Cervical spinal stenosis     Cervical spondylolysis     Chronic mastoiditis     Complex endometrial hyperplasia     Depression     Diabetes mellitus ([address] )     DVT (deep venous thrombosis) (HCC)     Fibromyalgia     Hyperlipidemia     Hypertension     Hypothyroid     Lumbar radiculopathy     Obese     Spinal stenosis     Stomach ulcer     Thyroid disease      Past Surgical History:   Procedure Laterality Date    BACK SURGERY      CARPAL TUNNEL RELEASE      CATARACT EXTRACTION      CHOLECYSTECTOMY      COLONOSCOPY      CORONARY ANGIOPLASTY      CORONARY ARTERY BYPASS GRAFT      CYSTOSCOPY N/A 6/20/2017    Procedure: Yvone Collie;  Surgeon: Juan Rdz MD;  [facility]: [facility];  Service: Gynecology Oncology    SD LAPAROSCOPY W TOT HYSTERECTUTERUS <=250 [address]es  W TUBE/OVARY N/A 6/20/2017    Procedure: ROBOTIC HYSTERECTOMY; BILATERAL SALPINO-OOPHERECTOMY; umbilical hernia repair ;  Surgeon: Juan Rdz MD;  [facility];  Service: Gynecology Oncology    TONSILECTOMY AND ADNOIDECTOMY      UMBILICAL HERNIA REPAIR       Current Outpatient Medications on File Prior to Visit   Medication Sig Dispense Refill    albuterol (2 5 mg/3 mL) 0 083 % nebulizer solution Take 3 mL (2 5 mg total) by nebulization every 6 (six) hours as needed for wheezing 75 mL 0    albuterol (PROAIR HFA) 90 mcg/act inhaler Inhale 2 puffs every 6 (six) hours as needed       ALPRAZolam (XANAX) 0 5 mg tablet Take 1 tablet (0 5 mg total) by mouth 2 (two) times a day as needed for anxiety 60 tablet 1    aspirin 81 MG tablet Take 81 mg by mouth daily      BAQSIMI TWO PACK 3 MG/DOSE POWD Use as directed  0    cholecalciferol (VITAMIN D3) 1,000 units tablet Take 2,000 Units by mouth daily      Co-Enzyme Q-10 100 MG CAPS Take 1 capsule by mouth 2 (two) times a day        diclofenac sodium (VOLTAREN) 1 % Apply 2 g topically 4 (four) times a day (Patient taking differently: Apply 2 g topically as needed ) 1 Tube 0    diclofenac sodium (VOLTAREN) 1 % Apply 2 g topically 4 (four) times a day 1 Tube 1    diltiazem (TIAZAC) 300 MG 24 hr capsule TAKE 1 CAPSULE BY MOUTH EVERY DAY IN THE MORNING  2    DULoxetine (CYMBALTA) 60 mg delayed release capsule TAKE 1 CAPSULE BY MOUTH EVERY MORNING 30 capsule 0    enalapril (VASOTEC) 20 mg tablet Take 20 mg by mouth daily      fluticasone (FLONASE) 50 mcg/act nasal spray 2 sprays into each nostril daily 16 g 0    insulin aspart (NovoLOG) 100 units/mL injection Inject 12 Units under the skin 3 (three) times a day before meals (Patient taking differently: Inject under the skin 3 (three) times a day before meals )  0    Insulin Disposable Pump (OMNIPOD DASH 5 PACK) MISC CHANGE PODS EVERY 2 DAYS UTD  3    isosorbide mononitrate (IMDUR) 30 mg 24 hr tablet TAKE 1 TABLET DAILY AT BEDTIME, 30 DAYS, #30, 4 REFILLS, EXTENDED RELEASE   6    levothyroxine 50 mcg tablet Take 50 mcg by mouth daily      metFORMIN (GLUCOPHAGE) 500 mg tablet Take 500 mg by mouth 2 (two) times a day with meals      nitroglycerin (NITROLINGUAL) 0 4 mg/spray spray USE ONE SPRAY Q 5 MINUTES AS NEEDED FOR CHEST PAIN  IF NO RELIEF, CALL 911   1    rosuvastatin (CRESTOR) 20 MG tablet Take 20 mg by mouth every evening  2    tiZANidine (ZANAFLEX) 2 mg tablet Take 2 mg by mouth daily at bedtime       No current facility-administered medications on file prior to visit        Family History   Problem Relation Age of Onset    Arthritis Mother     Leukemia Mother     Other Mother         Anxiety, major depressive disorder, recurrent episode with atypical features    Coronary artery disease Father         Heart problem    Diabetes Father     Other Father         Infectious disease    Alzheimer's disease Maternal Grandmother     Other Maternal Grandfather         Heart problem    Other Daughter         Anxiety, major depressive disorder, recurrent episode with atypical features    Alcohol abuse Other         Grandparent    Cancer Family     Diabetes Family     Hypertension Family     Other Family         Reported prior back trouble, thyroid disorder     Social History     Socioeconomic History    Marital status: /Civil Union     Spouse name: Not on file    Number of children: 2    Years of education: High school or GED    Highest education level: Not on file   Occupational History    Occupation: Retired   Social Needs    Financial resource strain: Not on file    Food insecurity:     Worry: Not on file     Inability: Not on file   [place] needs:     Medical: Not on file     Non-medical: Not on file   Tobacco Use    Smoking status: Former Smoker     Packs/day: 1      Years: 6      Pack years: 6 00     Types: Cigarettes     Last attempt to quit:      Years since quittin 8    Smokeless tobacco: Never Used    Tobacco comment: Smoked about a half a pack for about 4 yrs  Quite about 50 yrs ago     Substance and Sexual Activity    Alcohol use: No    Drug use: No    Sexual activity: Never     Comment: No known STD risk factors   Lifestyle    Physical activity:     Days per week: Not on file     Minutes per session: Not on file    Stress: Not on file   Relationships    Social connections:     Talks on phone: Not on file     Gets together: Not on file     Attends [employer] service: Not on file     Active member of club or organization: Not on file     Attends meetings of clubs or organizations: Not on file     Relationship status: Not on file    Intimate partner violence:     Fear of current or ex partner: Not on file     Emotionally abused: Not on file     Physically abused: Not on file     Forced sexual activity: Not on file   Other Topics Concern    Not on file   Social History Narrative    Lives independently with spouse    No known risk factors    Denied:  Exercising regularly     Vitals:    19 1135   BP: 148/64   Pulse: 73   Resp: 16   SpO2: 96%   Weight: 87 8 kg (193 lb 9 6 oz)   Height: 5' 1 5" (1 562 m)     Results for orders placed or performed in visit on 10/01/19   Microalbumin / creatinine urine ratio   Result Value Ref Range    Creatinine, Ur 111 0 mg/dL    Microalbum  ,U,Random 182 0 (H) 0 0 - 20 0 mg/L    Microalb Creat Ratio 164 (H) 0 - 30 mg/g creatinine     Weight (last 2 days)     Date/Time   Weight    11/13/19 1135   87 8 (193 6)            Body mass index is 35 99 kg/m²  BP      Temp      Pulse     Resp      SpO2        Vitals:    11/13/19 1135   Weight: 87 8 kg (193 lb 9 6 oz)     Vitals:    11/13/19 1135   Weight: 87 8 kg (193 lb 9 6 oz)       /64   Pulse 73   Resp 16   Ht 5' 1 5" (1 562 m)   Wt 87 8 kg (193 lb 9 6 oz)   SpO2 96%   BMI 35 99 kg/m²          Physical Exam   Constitutional: She appears well-developed and well-nourished  HENT:   Head: Normocephalic  Mouth/Throat: Oropharynx is clear and moist    Eyes: Pupils are equal, round, and reactive to light  Conjunctivae are normal  Right eye exhibits no discharge  Left eye exhibits no discharge  No scleral icterus  Neck: Neck supple  Cardiovascular: Normal rate, regular rhythm, normal heart sounds and intact distal pulses  Exam reveals no gallop and no friction rub  Pulses are no weak pulses  No murmur heard  Pulses:       Dorsalis pedis pulses are 2+ on the right side, and 2+ on the left side  Posterior tibial pulses are 2+ on the right side, and 2+ on the left side  Pulmonary/Chest: Breath sounds normal  No respiratory distress  She has no wheezes  She has no rales  Abdominal: Soft  Bowel sounds are normal  She exhibits no distension and no mass  There is no tenderness  There is no rebound and no guarding  Musculoskeletal: She exhibits no edema or deformity  Feet:   Right Foot:   Skin Integrity: Negative for ulcer, skin breakdown, erythema, warmth, callus or dry skin     Left Foot:   Skin Integrity: Negative for ulcer, skin breakdown, erythema, warmth, callus or dry skin  Lymphadenopathy:     She has no cervical adenopathy  Neurological: She is alert  Coordination normal    Psychiatric: Her mood appears anxious  She does not exhibit a depressed mood  She expresses no suicidal ideation

## 2019-11-13 NOTE — PATIENT INSTRUCTIONS
Processed stressors as well as long-standing issues with her mother that will not change  Reviewed coping strategies for hr anxiety and need to maintain appropriate boundaries with mother  Reviewed symptoms of PTSD and provided her handout for her review  Will resend referral for women's group and will continue to meet with her on a PRN basis

## 2019-11-14 NOTE — ASSESSMENT & PLAN NOTE
Lab Results   Component Value Date    HGBA1C 8 8 08/13/2018   Target A1c under 7 I have counselled the pt to follow a healthy and balanced diet ,and recommend routine exercise  Currently on the insulin pump working with Endocrinology annual eye examination recommended foot examination completed I will be ordering diabetic laboratories including comprehensive metabolic panel, hemoglobin A1c, urine microalbumin, lipid panel

## 2019-11-14 NOTE — ASSESSMENT & PLAN NOTE
Increased she is under a lot of anxiety today causing her blood pressure to be elevated for now will continue monitor she will follow up Cardiology she reports me that cardiologist recently decrease her blood pressure pills because concerned about low blood pressure dizziness, she will discuss this further with Cardiology

## 2019-11-14 NOTE — ASSESSMENT & PLAN NOTE
Significant anxiety no SI related to multiple family issues would like her to see Psychiatry and also counselor Corrine Hollis today    She will continue with current medical regiment

## 2019-11-22 ENCOUNTER — OFFICE VISIT (OUTPATIENT)
Dept: OBGYN CLINIC | Facility: HOSPITAL | Age: 74
End: 2019-11-22
Payer: COMMERCIAL

## 2019-11-22 VITALS
HEIGHT: 62 IN | DIASTOLIC BLOOD PRESSURE: 71 MMHG | SYSTOLIC BLOOD PRESSURE: 155 MMHG | BODY MASS INDEX: 34.96 KG/M2 | WEIGHT: 190 LBS | HEART RATE: 73 BPM

## 2019-11-22 DIAGNOSIS — S63.501D SPRAIN OF RIGHT WRIST, SUBSEQUENT ENCOUNTER: Primary | ICD-10-CM

## 2019-11-22 DIAGNOSIS — M19.041 PRIMARY OSTEOARTHRITIS OF RIGHT HAND: ICD-10-CM

## 2019-11-22 PROCEDURE — 99213 OFFICE O/P EST LOW 20 MIN: CPT | Performed by: SURGERY

## 2019-11-22 NOTE — PROGRESS NOTES
ASSESSMENT/PLAN:      68 y o  female with a right wrist sprain, DOI 10/12/19  The patient verbalized understanding of exam findings and treatment plan  We engaged in the shared decision-making process and treatment options were discussed at length with the patient  Surgical and conservative management discussed today along with risks and benefits  There are no diagnoses linked to this encounter  {[facility] Discussions:37417}    Follow Up:  No follow-ups on file  To Do Next Visit:  {To do next visit:70873::"Re-evaluation of current issue"}    ____________________________________________________________________________________________________________________________________________      CHIEF COMPLAINT:  Chief Complaint   Patient presents with    Right Hand - Follow-up       SUBJECTIVE:  Patricia Rios is a 68y o  year old ***HD female who presents ***        I have personally reviewed all the relevant PMH, PSH, SH, FH, Medications and allergies       PAST MEDICAL HISTORY:  Past Medical History:   Diagnosis Date    Acute embolism and thrombosis of unspecified deep veins of unspecified lower extremity (HCC)     Last Assessed:  5/18/17    Anemia     Anosmia     Anxiety     Arthritis     Asthma     Back pain     Bilateral macular retinal edema     CAD (coronary artery disease)     Cataract     Cervical disc herniation     Cervical radiculopathy     Cervical spinal stenosis     Cervical spondylolysis     Chronic mastoiditis     Complex endometrial hyperplasia     Depression     Diabetes mellitus ([address] )     DVT (deep venous thrombosis) (HCC)     Fibromyalgia     Hyperlipidemia     Hypertension     Hypothyroid     Lumbar radiculopathy     Obese     Spinal stenosis     Stomach ulcer     Thyroid disease        PAST SURGICAL HISTORY:  Past Surgical History:   Procedure Laterality Date    BACK SURGERY      CARPAL TUNNEL RELEASE      CATARACT EXTRACTION      CHOLECYSTECTOMY      COLONOSCOPY      CORONARY ANGIOPLASTY      CORONARY ARTERY BYPASS GRAFT      CYSTOSCOPY N/A 2017    Procedure: Leif Ji;  Surgeon: Bita Watts MD;  [facility];  Service: Gynecology Oncology    MO LAPAROSCOPY W TOT HYSTERECTUTERUS <=250 GRAM  W TUBE/OVARY N/A 2017    Procedure: ROBOTIC HYSTERECTOMY; BILATERAL SALPINO-OOPHERECTOMY; umbilical hernia repair ;  Surgeon: Bita Watts MD;  [facility];  Service: Gynecology Oncology    TONSILECTOMY AND ADNOIDECTOMY      UMBILICAL HERNIA REPAIR         FAMILY HISTORY:  Family History   Problem Relation Age of Onset    Arthritis Mother     Leukemia Mother     Other Mother         Anxiety, major depressive disorder, recurrent episode with atypical features    Coronary artery disease Father         Heart problem    Diabetes Father     Other Father         Infectious disease    Alzheimer's disease Maternal Grandmother     Other Maternal Grandfather         Heart problem    Other Daughter         Anxiety, major depressive disorder, recurrent episode with atypical features    Alcohol abuse Other         Grandparent    Cancer Family     Diabetes Family     Hypertension Family     Other Family         Reported prior back trouble, thyroid disorder       SOCIAL HISTORY:  Social History     Tobacco Use    Smoking status: Former Smoker     Packs/day: 1 00     Years: 6 00     Pack years: 6 00     Types: Cigarettes     Last attempt to quit:      Years since quittin 9    Smokeless tobacco: Never Used    Tobacco comment: Smoked about a half a pack for about 4 yrs  Quite about 50 yrs ago     Substance Use Topics    Alcohol use: No    Drug use: No       MEDICATIONS:    Current Outpatient Medications:     albuterol (2 5 mg/3 mL) 0 083 % nebulizer solution, Take 3 mL (2 5 mg total) by nebulization every 6 (six) hours as needed for wheezing, Disp: 75 mL, Rfl: 0    albuterol (PROAIR HFA) 90 mcg/act inhaler, Inhale 2 puffs every 6 (six) hours as needed , Disp: , Rfl:     ALPRAZolam (XANAX) 0 5 mg tablet, Take 1 tablet (0 5 mg total) by mouth 2 (two) times a day as needed for anxiety, Disp: 60 tablet, Rfl: 1    aspirin 81 MG tablet, Take 81 mg by mouth daily, Disp: , Rfl:     BAQSIMI TWO PACK 3 MG/DOSE POWD, Use as directed, Disp: , Rfl: 0    cholecalciferol (VITAMIN D3) 1,000 units tablet, Take 2,000 Units by mouth daily, Disp: , Rfl:     Co-Enzyme Q-10 100 MG CAPS, Take 1 capsule by mouth 2 (two) times a day  , Disp: , Rfl:     diclofenac sodium (VOLTAREN) 1 %, Apply 2 g topically 4 (four) times a day (Patient taking differently: Apply 2 g topically as needed ), Disp: 1 Tube, Rfl: 0    diclofenac sodium (VOLTAREN) 1 %, Apply 2 g topically 4 (four) times a day, Disp: 1 Tube, Rfl: 1    diltiazem (TIAZAC) 300 MG 24 hr capsule, TAKE 1 CAPSULE BY MOUTH EVERY DAY IN THE MORNING, Disp: , Rfl: 2    DULoxetine (CYMBALTA) 60 mg delayed release capsule, TAKE 1 CAPSULE BY MOUTH EVERY MORNING, Disp: 30 capsule, Rfl: 0    enalapril (VASOTEC) 20 mg tablet, Take 20 mg by mouth daily, Disp: , Rfl:     fluticasone (FLONASE) 50 mcg/act nasal spray, 2 sprays into each nostril daily, Disp: 16 g, Rfl: 0    insulin aspart (NovoLOG) 100 units/mL injection, Inject 12 Units under the skin 3 (three) times a day before meals (Patient taking differently: Inject under the skin 3 (three) times a day before meals ), Disp: , Rfl: 0    Insulin Disposable Pump (OMNIPOD DASH 5 PACK) MISC, CHANGE PODS EVERY 2 DAYS UTD, Disp: , Rfl: 3    isosorbide mononitrate (IMDUR) 30 mg 24 hr tablet, TAKE 1 TABLET DAILY AT BEDTIME, 30 DAYS, #30, 4 REFILLS, EXTENDED RELEASE , Disp: , Rfl: 6    levothyroxine 50 mcg tablet, Take 50 mcg by mouth daily, Disp: , Rfl:     metFORMIN (GLUCOPHAGE) 500 mg tablet, Take 500 mg by mouth 2 (two) times a day with meals, Disp: , Rfl:     nitroglycerin (NITROLINGUAL) 0 4 mg/spray spray, USE ONE SPRAY Q 5 MINUTES AS NEEDED FOR CHEST PAIN  IF NO RELIEF, CALL 911 , Disp: , Rfl: 1    rosuvastatin (CRESTOR) 20 MG tablet, Take 20 mg by mouth every evening, Disp: , Rfl: 2    tiZANidine (ZANAFLEX) 2 mg tablet, Take 2 mg by mouth daily at bedtime, Disp: , Rfl:     ALLERGIES:  Allergies   Allergen Reactions    Molds & Smuts     Other      RAGWEED, CAT DANDER, DOG DANDER     Pollen Extract Other (See Comments)     Cold symptoms         REVIEW OF SYSTEMS:  Review of Systems    VITALS:  Vitals:    11/22/19 1335   BP: 155/71   Pulse: 73       LABS:  HgA1c:   Lab Results   Component Value Date    HGBA1C 8 8 08/13/2018     BMP:   Lab Results   Component Value Date    GLUCOSE 248 (H) 11/09/2015    CALCIUM 9 2 04/24/2018     11/16/2017    K 5 6 (H) 04/24/2018    CO2 22 04/24/2018     04/24/2018    BUN 23 04/24/2018    CREATININE 1 42 (H) 04/24/2018       _____________________________________________________  PHYSICAL EXAMINATION:  General: {8601:08694::"NJNE developed and well nourished","alert","oriented times 3","appears comfortable"}  Psychiatric: {Psych:05636::"Normal"}  HEENT: Normocephalic, Atraumatic Trachea Midline, No torticollis  Pulmonary: No audible wheezing or respiratory distress   Cardiovascular: No pitting edema, 2+ radial pulse   Skin: {Skin exam:08277}  Neurovascular: {Nerve Exam:01714::"Sensation Intact to the Median, Ulnar, Radial Nerve","Motor Intact to the Median, Ulnar, Radial Nerve","Pulses Intact"}  Musculoskeletal: Normal, except as noted in detailed exam and in HPI        MUSCULOSKELETAL EXAMINATION:      ___________________________________________________  STUDIES REVIEWED:  I have personally reviewed AP lateral and oblique radiographs of ***   which demonstrate ***           PROCEDURES PERFORMED:  Procedures  {Was Procdoc done:09356::"No Procedures performed today"}    _____________________________________________________      Lety Castaneda    I,:    am acting as a scribe while in the presence of the attending physician :        I,:    personally performed the services described in this documentation    as scribed in my presence :

## 2019-11-22 NOTE — PROGRESS NOTES
ASSESSMENT/PLAN:      69-year-old female with right wrist sprain, date of injury 10/12/2019  Patient is back at baseline regarding her hand  She does have arthritis in multiple joints of the hand which bothers her from time to time, however she reports she is fine with her hand the way it is  Discussed that at some point if she finds 1 or 2 joints are particularly painful we can try steroid injections at that time  Otherwise recommend keeping the hand warm especially during the winter as well as over-the-counter medications such as Tylenol  Patient is a diabetic and takes several cardiac medications so cannot take NSAIDs or oral steroids  Follow-up on an as-needed basis    The patient verbalized understanding of exam findings and treatment plan  We engaged in the shared decision-making process and treatment options were discussed at length with the patient  Surgical and conservative management discussed today along with risks and benefits  Diagnoses and all orders for this visit:    Sprain of right wrist, subsequent encounter    Primary osteoarthritis of right hand      Follow Up:  Return if symptoms worsen or fail to improve  To Do Next Visit:  Re-evaluation of current issue    ____________________________________________________________________________________________________________________________________________      CHIEF COMPLAINT:  Chief Complaint   Patient presents with    Right Hand - Follow-up       SUBJECTIVE:  Constance Laurent is a 68y o  year old RHD female who presents for follow-up evaluation of a right wrist sprain that occurred on 10/12/2019  Patient reports that she still has some discomfort in her hand but this was present prior to her injury, at this time she feels like she is back at baseline  She does have arthritis in her hand but is unable to pinpoint 1 specific location that is painful  No numbness or tingling, no functional limitations    She has discontinue use of her splint and doing well  I have personally reviewed all the relevant PMH, PSH, SH, FH, Medications and allergies       PAST MEDICAL HISTORY:  Past Medical History:   Diagnosis Date    Acute embolism and thrombosis of unspecified deep veins of unspecified lower extremity (HCC)     Last Assessed:  5/18/17    Anemia     Anosmia     Anxiety     Arthritis     Asthma     Back pain     Bilateral macular retinal edema     CAD (coronary artery disease)     Cataract     Cervical disc herniation     Cervical radiculopathy     Cervical spinal stenosis     Cervical spondylolysis     Chronic mastoiditis     Complex endometrial hyperplasia     Depression     Diabetes mellitus (HCC)     DVT (deep venous thrombosis) (HCC)     Fibromyalgia     Hyperlipidemia     Hypertension     Hypothyroid     Lumbar radiculopathy     Obese     Spinal stenosis     Stomach ulcer     Thyroid disease        PAST SURGICAL HISTORY:  Past Surgical History:   Procedure Laterality Date    BACK SURGERY      CARPAL TUNNEL RELEASE      CATARACT EXTRACTION      CHOLECYSTECTOMY      COLONOSCOPY      CORONARY ANGIOPLASTY      CORONARY ARTERY BYPASS GRAFT      CYSTOSCOPY N/A 6/20/2017    Procedure: Ioana Tai;  Surgeon: Jaxson Marshall MD;  [facility];  Service: Gynecology Oncology    MD LAPAROSCOPY W TOT HYSTERECTUTERUS <=250 GRAM  W TUBE/OVARY N/A 6/20/2017    Procedure: ROBOTIC HYSTERECTOMY; BILATERAL SALPINO-OOPHERECTOMY; umbilical hernia repair ;  Surgeon: Jaxson Marshall MD;  [facility];  Service: Gynecology Oncology    TONSILECTOMY AND ADNOIDECTOMY      UMBILICAL HERNIA REPAIR         FAMILY HISTORY:  Family History   Problem Relation Age of Onset    Arthritis Mother     Leukemia Mother     Other Mother         Anxiety, major depressive disorder, recurrent episode with atypical features    Coronary artery disease Father         Heart problem    Diabetes Father     Other Father Infectious disease    Alzheimer's disease Maternal Grandmother     Other Maternal Grandfather         Heart problem    Other Daughter         Anxiety, major depressive disorder, recurrent episode with atypical features    Alcohol abuse Other         Grandparent    Cancer Family     Diabetes Family     Hypertension Family     Other Family         Reported prior back trouble, thyroid disorder       SOCIAL HISTORY:  Social History     Tobacco Use    Smoking status: Former Smoker     Packs/day: 1 00     Years: 6 00     Pack years: 6 00     Types: Cigarettes     Last attempt to quit:      Years since quittin 9    Smokeless tobacco: Never Used    Tobacco comment: Smoked about a half a pack for about 4 yrs  Quite about 50 yrs ago     Substance Use Topics    Alcohol use: No    Drug use: No       MEDICATIONS:    Current Outpatient Medications:     albuterol (2 5 mg/3 mL) 0 083 % nebulizer solution, Take 3 mL (2 5 mg total) by nebulization every 6 (six) hours as needed for wheezing, Disp: 75 mL, Rfl: 0    albuterol (PROAIR HFA) 90 mcg/act inhaler, Inhale 2 puffs every 6 (six) hours as needed , Disp: , Rfl:     ALPRAZolam (XANAX) 0 5 mg tablet, Take 1 tablet (0 5 mg total) by mouth 2 (two) times a day as needed for anxiety, Disp: 60 tablet, Rfl: 1    aspirin 81 MG tablet, Take 81 mg by mouth daily, Disp: , Rfl:     BAQSIMI TWO PACK 3 MG/DOSE POWD, Use as directed, Disp: , Rfl: 0    cholecalciferol (VITAMIN D3) 1,000 units tablet, Take 2,000 Units by mouth daily, Disp: , Rfl:     Co-Enzyme Q-10 100 MG CAPS, Take 1 capsule by mouth 2 (two) times a day  , Disp: , Rfl:     diclofenac sodium (VOLTAREN) 1 %, Apply 2 g topically 4 (four) times a day (Patient taking differently: Apply 2 g topically as needed ), Disp: 1 Tube, Rfl: 0    diclofenac sodium (VOLTAREN) 1 %, Apply 2 g topically 4 (four) times a day, Disp: 1 Tube, Rfl: 1    diltiazem (TIAZAC) 300 MG 24 hr capsule, TAKE 1 CAPSULE BY MOUTH EVERY DAY IN THE MORNING, Disp: , Rfl: 2    DULoxetine (CYMBALTA) 60 mg delayed release capsule, TAKE 1 CAPSULE BY MOUTH EVERY MORNING, Disp: 30 capsule, Rfl: 0    enalapril (VASOTEC) 20 mg tablet, Take 20 mg by mouth daily, Disp: , Rfl:     fluticasone (FLONASE) 50 mcg/act nasal spray, 2 sprays into each nostril daily, Disp: 16 g, Rfl: 0    insulin aspart (NovoLOG) 100 units/mL injection, Inject 12 Units under the skin 3 (three) times a day before meals (Patient taking differently: Inject under the skin 3 (three) times a day before meals ), Disp: , Rfl: 0    Insulin Disposable Pump (OMNIPOD DASH 5 PACK) MISC, CHANGE PODS EVERY 2 DAYS UTD, Disp: , Rfl: 3    isosorbide mononitrate (IMDUR) 30 mg 24 hr tablet, TAKE 1 TABLET DAILY AT BEDTIME, 30 DAYS, #30, 4 REFILLS, EXTENDED RELEASE , Disp: , Rfl: 6    levothyroxine 50 mcg tablet, Take 50 mcg by mouth daily, Disp: , Rfl:     metFORMIN (GLUCOPHAGE) 500 mg tablet, Take 500 mg by mouth 2 (two) times a day with meals, Disp: , Rfl:     nitroglycerin (NITROLINGUAL) 0 4 mg/spray spray, USE ONE SPRAY Q 5 MINUTES AS NEEDED FOR CHEST PAIN  IF NO RELIEF, CALL 911 , Disp: , Rfl: 1    rosuvastatin (CRESTOR) 20 MG tablet, Take 20 mg by mouth every evening, Disp: , Rfl: 2    tiZANidine (ZANAFLEX) 2 mg tablet, Take 2 mg by mouth daily at bedtime, Disp: , Rfl:     ALLERGIES:  Allergies   Allergen Reactions    Molds & Smuts     Other      RAGWEED, CAT DANDER, DOG DANDER     Pollen Extract Other (See Comments)     Cold symptoms         REVIEW OF SYSTEMS:  Review of Systems   Constitutional: Negative for chills, diaphoresis, fatigue and fever  HENT: Negative for congestion, facial swelling, hearing loss, sore throat, trouble swallowing and voice change  Respiratory: Negative for apnea, cough, shortness of breath, wheezing and stridor  Cardiovascular: Negative for chest pain, palpitations and leg swelling     Gastrointestinal: Negative for abdominal pain, constipation, nausea and vomiting  Endocrine: Negative for cold intolerance and heat intolerance  Musculoskeletal: Positive for arthralgias  Negative for gait problem, joint swelling and myalgias  Skin: Negative for color change, pallor, rash and wound  Neurological: Negative for tremors, speech difficulty, weakness and numbness  Psychiatric/Behavioral: Negative for agitation, confusion, decreased concentration and hallucinations  The patient is not nervous/anxious  VITALS:  Vitals:    11/22/19 1335   BP: 155/71   Pulse: 73       LABS:  HgA1c:   Lab Results   Component Value Date    HGBA1C 8 8 08/13/2018     BMP:   Lab Results   Component Value Date    GLUCOSE 248 (H) 11/09/2015    CALCIUM 9 2 04/24/2018     11/16/2017    K 5 6 (H) 04/24/2018    CO2 22 04/24/2018     04/24/2018    BUN 23 04/24/2018    CREATININE 1 42 (H) 04/24/2018       _____________________________________________________  PHYSICAL EXAMINATION:  General: well developed and well nourished, alert, oriented times 3 and appears comfortable  Psychiatric: Normal  HEENT: Normocephalic, Atraumatic Trachea Midline, No torticollis  Pulmonary: No audible wheezing or respiratory distress   Cardiovascular: No pitting edema, 2+ radial pulse   Skin: No masses, erythema, lacerations, fluctation, ulcerations  Neurovascular: Sensation Intact to the Median, Ulnar, Radial Nerve, Motor Intact to the Median, Ulnar, Radial Nerve and Pulses Intact  Musculoskeletal: Normal, except as noted in detailed exam and in HPI  MUSCULOSKELETAL EXAMINATION:  Right wrist  Nontender to palpation  Range of motion of the right wrist  is 70 degrees flexion, 50 degrees extension, 85 degrees pronation,85 degrees supination  There is no swelling present  There is no ecchymosis noted      Pulses are present and capillary fill is normal      Full composite fist  ___________________________________________________  STUDIES REVIEWED:  No new studies to review    PROCEDURES PERFORMED:  Procedures  No Procedures performed today    _____________________________________________________      PA Nicolle: I supervised the physician assistant and discussed the case with them  I personally performed history and physical exam  I agree with the assessment and plan of care as documented by the physician assistant

## 2019-11-29 DIAGNOSIS — F41.9 ANXIETY: ICD-10-CM

## 2019-11-29 RX ORDER — ALPRAZOLAM 0.5 MG/1
0.5 TABLET ORAL 2 TIMES DAILY PRN
Qty: 60 TABLET | Refills: 1 | Status: SHIPPED | OUTPATIENT
Start: 2019-11-29 | End: 2020-02-03 | Stop reason: SDUPTHER

## 2019-11-30 DIAGNOSIS — F41.9 ANXIETY: ICD-10-CM

## 2019-11-30 RX ORDER — DULOXETIN HYDROCHLORIDE 60 MG/1
CAPSULE, DELAYED RELEASE ORAL
Qty: 30 CAPSULE | Refills: 0 | Status: SHIPPED | OUTPATIENT
Start: 2019-11-30 | End: 2020-02-13 | Stop reason: SDUPTHER

## 2019-12-20 ENCOUNTER — OFFICE VISIT (OUTPATIENT)
Dept: PODIATRY | Facility: CLINIC | Age: 74
End: 2019-12-20
Payer: COMMERCIAL

## 2019-12-20 VITALS
SYSTOLIC BLOOD PRESSURE: 162 MMHG | WEIGHT: 194 LBS | DIASTOLIC BLOOD PRESSURE: 97 MMHG | HEIGHT: 61 IN | BODY MASS INDEX: 36.63 KG/M2 | HEART RATE: 81 BPM

## 2019-12-20 DIAGNOSIS — E11.42 DIABETIC POLYNEUROPATHY ASSOCIATED WITH TYPE 2 DIABETES MELLITUS (HCC): Primary | ICD-10-CM

## 2019-12-20 DIAGNOSIS — B35.1 ONYCHOMYCOSIS: ICD-10-CM

## 2019-12-20 DIAGNOSIS — L85.1 ACQUIRED KERATODERMA: ICD-10-CM

## 2019-12-20 PROCEDURE — 11721 DEBRIDE NAIL 6 OR MORE: CPT | Performed by: PODIATRIST

## 2019-12-20 PROCEDURE — 11056 PARNG/CUTG B9 HYPRKR LES 2-4: CPT | Performed by: PODIATRIST

## 2019-12-20 RX ORDER — METFORMIN HYDROCHLORIDE 500 MG/1
TABLET, EXTENDED RELEASE ORAL
COMMUNITY
Start: 2019-12-17 | End: 2019-12-20 | Stop reason: SDUPTHER

## 2019-12-20 NOTE — PROGRESS NOTES
PATIENT:  Flash Tripp  1945    ASSESSMENT/PLAN:  1  Diabetic polyneuropathy associated with type 2 diabetes mellitus ([address] )     2  Onychomycosis  Debridement   3  Acquired keratoderma  Lesion Destruction              Disease prevention and related risk factors of diabetes were identified and discussed  The patient was educated in proper foot wear for diabetics  Also educated in daily foot assessment and routine diabetic foot care  Discussed the importance of controlling BS through diet and exercise  The patient will follow up in 9 weeks for further diabetic foot exam and care     PROCEDURE:  All mycotic toenails were reduced and debrided in length, width, and girth using a nail nipper and dremel  All hyperkeratotic skin lesion(s) were sharply pared with a scalpel with no bleeding or evidence of ulceration  Patient tolerated procedure(s) well without complications  HPI:  Flash Tripp is a 69 y  o year old female seen for diabetic foot exam   The patient has class findings and diabetic neuropathy  BS is under control  The patient complained of thick toenails and calluses causing pain  She has some numbness and paresthesia in her feet  The patient denied any acute pedal disorder, redness, acute swelling, or recent injury            PAST MEDICAL HISTORY:  Past Medical History:   Diagnosis Date    Acute embolism and thrombosis of unspecified deep veins of unspecified lower extremity (HCC)     Last Assessed:  5/18/17    Anemia     Anosmia     Anxiety     Arthritis     Asthma     Back pain     Bilateral macular retinal edema     CAD (coronary artery disease)     Cataract     Cervical disc herniation     Cervical radiculopathy     Cervical spinal stenosis     Cervical spondylolysis     Chronic mastoiditis     Complex endometrial hyperplasia     Depression     Diabetes mellitus (HCC)     DVT (deep venous thrombosis) (HCC)     Fibromyalgia     Hyperlipidemia     Hypertension     Hypothyroid     Lumbar radiculopathy     Obese     Spinal stenosis     Stomach ulcer     Thyroid disease        PAST SURGICAL HISTORY:  Past Surgical History:   Procedure Laterality Date    BACK SURGERY      CARPAL TUNNEL RELEASE      CATARACT EXTRACTION      CHOLECYSTECTOMY      COLONOSCOPY      CORONARY ANGIOPLASTY      CORONARY ARTERY BYPASS GRAFT      CYSTOSCOPY N/A 6/20/2017    Procedure: Thomas Wilson;  Surgeon: Shawn Jamison MD;  [facility];  Service: Gynecology Oncology    CA LAPAROSCOPY W TOT HYSTERECTUTERUS <=250 GRAM  W TUBE/OVARY N/A 6/20/2017    Procedure: ROBOTIC HYSTERECTOMY; BILATERAL SALPINO-OOPHERECTOMY; umbilical hernia repair ;  Surgeon: Shawn Jamison MD;  [facility];  Service: Gynecology Oncology    TONSILECTOMY AND ADNOIDECTOMY      UMBILICAL HERNIA REPAIR          ALLERGIES:  Molds & smuts;  Other; and Pollen extract    MEDICATIONS:  Current Outpatient Medications   Medication Sig Dispense Refill    albuterol (2 5 mg/3 mL) 0 083 % nebulizer solution Take 3 mL (2 5 mg total) by nebulization every 6 (six) hours as needed for wheezing 75 mL 0    albuterol (PROAIR HFA) 90 mcg/act inhaler Inhale 2 puffs every 6 (six) hours as needed       ALPRAZolam (XANAX) 0 5 mg tablet Take 1 tablet (0 5 mg total) by mouth 2 (two) times a day as needed for anxiety 60 tablet 1    aspirin 81 MG tablet Take 81 mg by mouth daily      BAQSIMI TWO PACK 3 MG/DOSE POWD Use as directed  0    cholecalciferol (VITAMIN D3) 1,000 units tablet Take 2,000 Units by mouth daily      Co-Enzyme Q-10 100 MG CAPS Take 1 capsule by mouth 2 (two) times a day        diclofenac sodium (VOLTAREN) 1 % Apply 2 g topically 4 (four) times a day (Patient taking differently: Apply 2 g topically as needed ) 1 Tube 0    diclofenac sodium (VOLTAREN) 1 % Apply 2 g topically 4 (four) times a day 1 Tube 1    diltiazem (TIAZAC) 300 MG 24 hr capsule TAKE 1 CAPSULE BY MOUTH EVERY DAY IN THE MORNING  2    DULoxetine (CYMBALTA) 60 mg delayed release capsule TAKE 1 CAPSULE BY MOUTH EVERY MORNING 30 capsule 0    enalapril (VASOTEC) 20 mg tablet Take 20 mg by mouth daily      fluticasone (FLONASE) 50 mcg/act nasal spray 2 sprays into each nostril daily 16 g 0    insulin aspart (NovoLOG) 100 units/mL injection Inject 12 Units under the skin 3 (three) times a day before meals (Patient taking differently: Inject under the skin 3 (three) times a day before meals )  0    Insulin Disposable Pump (OMNIPOD DASH 5 PACK) MISC CHANGE PODS EVERY 2 DAYS UTD  3    isosorbide mononitrate (IMDUR) 30 mg 24 hr tablet TAKE 1 TABLET DAILY AT BEDTIME, 30 DAYS, #30, 4 REFILLS, EXTENDED RELEASE   6    levothyroxine 50 mcg tablet Take 50 mcg by mouth daily      metFORMIN (GLUCOPHAGE) 500 mg tablet Take 500 mg by mouth 2 (two) times a day with meals      nitroglycerin (NITROLINGUAL) 0 4 mg/spray spray USE ONE SPRAY Q 5 MINUTES AS NEEDED FOR CHEST PAIN  IF NO RELIEF, CALL 911   1    rosuvastatin (CRESTOR) 20 MG tablet Take 20 mg by mouth every evening  2    tiZANidine (ZANAFLEX) 2 mg tablet Take 2 mg by mouth daily at bedtime       No current facility-administered medications for this visit          SOCIAL HISTORY:  Social History     Socioeconomic History    Marital status: /Civil Union     Spouse name: None    Number of children: 2    Years of education: High school or GED    Highest education level: None   Occupational History    Occupation: Retired   Social Needs    Financial resource strain: None    Food insecurity:     Worry: None     Inability: None    Transportation needs:     Medical: None     Non-medical: None   Tobacco Use    Smoking status: Former Smoker     Packs/day: 1 00     Years: 6 00     Pack years: 6 00     Types: Cigarettes     Last attempt to quit:      Years since quittin 9    Smokeless tobacco: Never Used    Tobacco comment: Smoked about a half a pack for about 4 yrs   Quite about 50 yrs ago  Substance and Sexual Activity    Alcohol use: No    Drug use: No    Sexual activity: Never     Comment: No known STD risk factors   Lifestyle    Physical activity:     Days per week: None     Minutes per session: None    Stress: None   Relationships    Social connections:     Talks on phone: None     Gets together: None     Attends Jainism service: None     Active member of club or organization: None     Attends meetings of clubs or organizations: None     Relationship status: None    Intimate partner violence:     Fear of current or ex partner: None     Emotionally abused: None     Physically abused: None     Forced sexual activity: None   Other Topics Concern    None   Social History Narrative    Lives independently with spouse    No known risk factors    Denied:  Exercising regularly        REVIEW OF SYSTEMS:  GENERAL: NAD, afebrile  HEART: No chest pain, or palpitation  RESPIRATORY:  No acute SOB or cough  GI: No Nausea, vomit or diarrhea  NEUROLOGIC: No syncope or acute weakness    PHYSICAL EXAM:  VASCULAR EXAM  Dorsalis pedis  +1, Posterior tibial artery  absent  The patient has class findings with skin atrophy, lack of digital hair, and nail dystrophy  There is +1 lower extremity edema bilaterally  Venous stasis skin changes noted BLE  NEUROLOGIC EXAM  Sensation is intact to light touch  Sensation is intact to 10gm monofilament  Decreased vibratory sensation in her feet  No focal neurologic deficit  DERMATOLOGIC EXAM:   No ulcer or cellulitis noted  The patient has hypertrophic toenails with discoloration, onycholysis, and subungal debris  No notable skin lesion  Patient has multiple punctate keratosis in bilateral submet 5  MUSCULOSKELETAL EXAM:   No acute joint pain, edema, or redness  No acute musculoskeletal problem  Patient has deformity including hammertoe

## 2020-01-29 ENCOUNTER — SOCIAL WORK (OUTPATIENT)
Dept: BEHAVIORAL/MENTAL HEALTH CLINIC | Facility: CLINIC | Age: 75
End: 2020-01-29
Payer: COMMERCIAL

## 2020-01-29 DIAGNOSIS — F32.A ANXIETY AND DEPRESSION: Primary | ICD-10-CM

## 2020-01-29 DIAGNOSIS — F41.9 ANXIETY AND DEPRESSION: Primary | ICD-10-CM

## 2020-01-29 PROCEDURE — 90834 PSYTX W PT 45 MINUTES: CPT | Performed by: SOCIAL WORKER

## 2020-01-29 NOTE — PSYCH
Assessment/Plan: Manage anxiety and depression     There are no diagnoses linked to this encounter  Subjective: Roderick Jones presents as anxious and overwhelmed  Long-standing personality issues with family contribute  Co-dependency a long-standing issue for her  Reports periods of depression and has had vague SI with no plan since last seen  Contracts for safety due to family  Patient ID: Patricia Rios is a 69 y o  female  Met with Roderick Jones for 50 minutes from 3:00PM-3:50PM  Continues to be anxious due to relationships/interactions with mother, spouse and daughter  Great deal of co-dependency contributing  Denies any acute depressive symptoms at present but they were problematic a week ago  No SI at present  Review of Systems   Psychiatric/Behavioral: The patient is nervous/anxious  Objective: Roderick Jones presents as anxious and overwhelmed  She is verbal, cooperative, well oriented and engaged during session       Physical Exam   Psychiatric: Her behavior is normal  Judgment and thought content normal

## 2020-01-29 NOTE — PATIENT INSTRUCTIONS
Reviewed coping strategies for anxiety and depression  Reviewed boundaries to maintain with others to limit exposure to additional stress and anxiety  Reviewed assertiveness strategies to utilize

## 2020-02-03 DIAGNOSIS — F41.9 ANXIETY: ICD-10-CM

## 2020-02-03 NOTE — TELEPHONE ENCOUNTER
PDMP checked-Last fill:1/6/20 (4 weeks 0 days)      Prescriptions    Filled  ID  Written  Drug  QTY  Days  Prescriber  Rx #  Pharmacy *  Refills  Daily Dose  Pymt Type      01/06/2020  1  11/29/2019  ALPRAZOLAM 0 5 MG TABLET  60 0  30  [facility]  [id number]  [facility] ([id number])  1   Private Pay  [place]

## 2020-02-04 ENCOUNTER — TELEPHONE (OUTPATIENT)
Dept: PSYCHIATRY | Facility: CLINIC | Age: 75
End: 2020-02-04

## 2020-02-04 NOTE — TELEPHONE ENCOUNTER
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

## 2020-02-05 RX ORDER — ALPRAZOLAM 0.5 MG/1
0.5 TABLET ORAL 2 TIMES DAILY PRN
Qty: 60 TABLET | Refills: 1 | Status: SHIPPED | OUTPATIENT
Start: 2020-02-05 | End: 2020-04-06 | Stop reason: SDUPTHER

## 2020-02-05 NOTE — TELEPHONE ENCOUNTER
Patient is on the last refill  Medication was filled back in November by Dr Joie Nevarez and he put in for 1 refill  She filled the last one in December so she would be due again

## 2020-02-13 DIAGNOSIS — F41.9 ANXIETY: ICD-10-CM

## 2020-02-13 RX ORDER — DULOXETIN HYDROCHLORIDE 60 MG/1
60 CAPSULE, DELAYED RELEASE ORAL EVERY MORNING
Qty: 30 CAPSULE | Refills: 0 | Status: SHIPPED | OUTPATIENT
Start: 2020-02-13 | End: 2020-03-10

## 2020-02-14 ENCOUNTER — TELEPHONE (OUTPATIENT)
Dept: PSYCHIATRY | Facility: CLINIC | Age: 75
End: 2020-02-14

## 2020-02-14 NOTE — TELEPHONE ENCOUNTER
Behavorial Health Outpatient Intake Questions    Referred by: PCP/Mike Staples    Please advised interviewee that they need to answer all questions truthfully to allow for best care and any misrepresentations of information may affect their ability to be seen at this clinic   => Was this discussed? Yes     Behavorial Health Outpatient Intake History -     Presenting Problem (in patient's words): depression, other medication, pt states she feels she is addicted to Xanax, and pulmonologist is suggesting she change med to do lung issues    Has the patient ever seen or is currently seeing a psychiatrist? Yes   If yes who/when? Long time ago/doesnt remember name  If seen as outpatient, have they been seen here (and by whom)? If not seen here, which provider(s) did the patient see and for how long? Has the patient ever seen or currently see a therapist? Yes If yes who/when? Had to see in order to get medication from psychiatrist/long time ago, doesn't remember name  Has a member of the patient's family been in therapy here? No  If yes, with whom? Has the patient been hospitalized for mental health? No   If yes, how long ago was last hospitalization and where was it? Substance Abuse:No concerns of substance abuse are reported  Does the patient have ICM or CTT? No    Is the patient taking injectable psychiatric medications? No    => If yes, patient cannot be seen here  Communications  Are there any developmental disabilities NO    Does the patient have hearing impairment? No       History-    Has the patient served in the [place] [address]? No    If yes, have you had combat services? No    Was the patient activated into federal active duty as a member of the [employer] or reserve? No    Legal History-     Does the patient have any history of arrests, [employer]/[facility] time, or DUIs? No  If Yes-  1) What types of charges? 2) When were they last incarcerated?   3) Are they currently on parole or probation? Minor Child-    Who has custody of the child? N/A    Is there a custody agreement? N/A    If there is a custody agreement remind parent that they must bring a copy to the first appt or they will not be seen  Intake Team, please check with provider before scheduling if flags come up such as:  - complex case  - legal history (other than DUI)  - communication barrier concerns are present  - if, in your judgment, this needs further review    ACCEPTED as a patient Yes  => Appointment Date: Friday, 4/24/20 @ 10am w/ Dr Mena Carr @ [address] [place] [address]    Referred Elsewhere? No    Name of Insurance Co: [employer]   Insurance ID#: [id number]  Insurance Phone #  If ins is primary or secondary  If patient is a minor, parents information such as Name, D  O B of guarantor

## 2020-02-17 ENCOUNTER — OFFICE VISIT (OUTPATIENT)
Dept: INTERNAL MEDICINE CLINIC | Facility: CLINIC | Age: 75
End: 2020-02-17
Payer: COMMERCIAL

## 2020-02-17 VITALS — DIASTOLIC BLOOD PRESSURE: 68 MMHG | SYSTOLIC BLOOD PRESSURE: 136 MMHG

## 2020-02-17 DIAGNOSIS — M48.061 SPINAL STENOSIS OF LUMBAR REGION, UNSPECIFIED WHETHER NEUROGENIC CLAUDICATION PRESENT: Primary | ICD-10-CM

## 2020-02-17 DIAGNOSIS — E11.8 TYPE 2 DIABETES MELLITUS WITH COMPLICATION, WITH LONG-TERM CURRENT USE OF INSULIN (HCC): ICD-10-CM

## 2020-02-17 DIAGNOSIS — Z79.4 TYPE 2 DIABETES MELLITUS WITH COMPLICATION, WITH LONG-TERM CURRENT USE OF INSULIN (HCC): ICD-10-CM
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
## 2020-02-17 DIAGNOSIS — E11.42 DIABETIC POLYNEUROPATHY ASSOCIATED WITH TYPE 2 DIABETES MELLITUS (HCC): ICD-10-CM

## 2020-02-17 DIAGNOSIS — F41.9 ANXIETY: Chronic | ICD-10-CM

## 2020-02-17 DIAGNOSIS — Z00.00 MEDICARE ANNUAL WELLNESS VISIT, SUBSEQUENT: ICD-10-CM
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
## 2020-02-17 PROCEDURE — 1160F RVW MEDS BY RX/DR IN RCRD: CPT | Performed by: INTERNAL MEDICINE

## 2020-02-17 PROCEDURE — 3075F SYST BP GE 130 - 139MM HG: CPT | Performed by: INTERNAL MEDICINE

## 2020-02-17 PROCEDURE — 1036F TOBACCO NON-USER: CPT | Performed by: INTERNAL MEDICINE

## 2020-02-17 PROCEDURE — 3078F DIAST BP <80 MM HG: CPT | Performed by: INTERNAL MEDICINE

## 2020-02-17 PROCEDURE — G0439 PPPS, SUBSEQ VISIT: HCPCS | Performed by: INTERNAL MEDICINE

## 2020-02-17 PROCEDURE — 3066F NEPHROPATHY DOC TX: CPT | Performed by: INTERNAL MEDICINE

## 2020-02-17 PROCEDURE — 99214 OFFICE O/P EST MOD 30 MIN: CPT | Performed by: INTERNAL MEDICINE

## 2020-02-17 PROCEDURE — 4040F PNEUMOC VAC/ADMIN/RCVD: CPT | Performed by: INTERNAL MEDICINE

## 2020-02-17 PROCEDURE — 1125F AMNT PAIN NOTED PAIN PRSNT: CPT | Performed by: INTERNAL MEDICINE

## 2020-02-17 PROCEDURE — 1170F FXNL STATUS ASSESSED: CPT | Performed by: INTERNAL MEDICINE

## 2020-02-17 NOTE — PATIENT INSTRUCTIONS
Fall Prevention   WHAT YOU NEED TO KNOW:   Fall prevention includes ways to make your home and other areas safer  It also includes ways you can move more carefully to prevent a fall  Health conditions that cause changes in your blood pressure, vision, or muscle strength and coordination may increase your risk for falls  Medicines may also increase your risk for falls if they make you dizzy, weak, or sleepy  DISCHARGE INSTRUCTIONS:   Call 911 or have someone else call if:   · You have fallen and are unconscious  · You have fallen and cannot move part of your body  Contact your healthcare provider if:   · You have fallen and have pain or a headache  · You have questions or concerns about your condition or care  Fall prevention tips:   · Stand or sit up slowly  This may help you keep your balance and prevent falls  · Use assistive devices as directed  Your healthcare provider may suggest that you use a cane or walker to help you keep your balance  You may need to have grab bars put in your bathroom near the toilet or in the shower  · Wear shoes that fit well and have soles that   Wear shoes both inside and outside  Use slippers with good   Do not wear shoes with high heels  · Wear a personal alarm  This is a device that allows you to call 911 if you fall and need help  Ask your healthcare provider for more information  · Stay active  Exercise can help strengthen your muscles and improve your balance  Your healthcare provider may recommend water aerobics or walking  He or she may also recommend physical therapy to improve your coordination  Never start an exercise program without talking to your healthcare provider first      · Manage your medical conditions  Keep all appointments with your healthcare providers  Visit your eye doctor as directed  Home safety tips:   · Add items to prevent falls in the bathroom    Put nonslip strips on your bath or shower floor to prevent you from slipping  Use a bath mat if you do not have carpet in the bathroom  This will prevent you from falling when you step out of the bath or shower  Use a shower seat so you do not need to stand while you shower  Sit on the toilet or a chair in your bathroom to dry yourself and put on clothing  This will prevent you from losing your balance from drying or dressing yourself while you are standing  · Keep paths clear  Remove books, shoes, and other objects from walkways and stairs  Place cords for telephones and lamps out of the way so that you do not need to walk over them  Tape them down if you cannot move them  Remove small rugs  If you cannot remove a rug, secure it with double-sided tape  This will prevent you from tripping  · Install bright lights in your home  Use night lights to help light paths to the bathroom or kitchen  Always turn on the light before you start walking  · Keep items you use often on shelves within reach  Do not use a step stool to help you reach an item  · Paint or place reflective tape on the edges of your stairs  This will help you see the stairs better  Follow up with your healthcare provider as directed:  Write down your questions so you remember to ask them during your visits  © 2017 [address] [employer] Information is for End User's use only and may not be sold, redistributed or otherwise used for commercial purposes  All illustrations and images included in CareNotes® are the copyrighted property of A D [employer] M [employer] Inc  or Ahsan Quezada  The above information is an  only  It is not intended as medical advice for individual conditions or treatments  Talk to your doctor, nurse or pharmacist before following any medical regimen to see if it is safe and effective for you
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
## 2020-02-18 ENCOUNTER — TELEPHONE (OUTPATIENT)
Dept: NEUROSURGERY | Facility: CLINIC | Age: 75
End: 2020-02-18

## 2020-02-18 NOTE — ASSESSMENT & PLAN NOTE
Anxiety/depression no suicidal ideation I have asked the patient to follow-up with counselor Renetta Reyes she is also working with Psychiatry doctor Sharon Duran

## 2020-02-18 NOTE — ASSESSMENT & PLAN NOTE
She reports me that she has failed pain management I will have her see neurosurgery Dr Gabriel Falling consideration of spinal cord stimulator

## 2020-02-18 NOTE — ASSESSMENT & PLAN NOTE
Assessment and plan 1  Medicare subsequent annual wellness examination overall the patient is clinically stable and doing well, we encouraged the patient to follow a healthy and balanced diet  We recommend that the patient exercise routinely approximately 30 minutes 5 times per week   We have reviewed the patient's vaccines and have made recommendations for updates if necessary  annual flu vaccine, consider new shingles vaccine      We will be ordering screening laboratories which are age appropriate  Return to the office in  3 months    call if any problems
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
## 2020-02-18 NOTE — TELEPHONE ENCOUNTER
Spoke with patient and informed her of DR's recommendations below  She will f/u with PCP regarding medical management (ie HA1c) and will reach out to pain mgmt to get the ball rolling on a potential SCS trial   Patient was appreciative for the call back  MD Marek Sadler RN   Cc: Josh Lee DO             Patient would typically 1st be evaluated by Pain Management to determine if she is a candidate for spinal cord stimulator  Tono Duran would consult me if there was any question if decompressive surgery would be helpful or her suitability as a surgical candidate   I did note and hemoglobin A1c of 8 8, though this was collected in 2018   A hemoglobin A1c of greater than 7 5 would preclude any consideration of spinal cord stimulator trial by the pain specialist or neurosurgery   I am not sure who she is following with from pain management   Would recommend she be optimized medically prior to considering any surgical intervention in the form of neuromodulation       Previous Messages      ----- Message -----   From: Marek Wright RN   Sent: 2/18/2020   9:51 AM EST   To: Марина Dove MD   Subject: patient referred to you by PCP ([facility] patient)       Patient of Dr Elena Section being referred to you by her PCP [facility]) because of her chronic pain and consideration of possible SCS   Patient has low back pain radiating into her bilateral thighs   Would you like to see patient with an updated study or should I have patient f/u with pain management first for possible SCS trial?

## 2020-02-18 NOTE — ASSESSMENT & PLAN NOTE
Lab Results   Component Value Date    HGBA1C 8 8 08/13/2018   I have counselled the pt to follow a healthy and balanced diet ,and recommend routine exercise  I will be ordering diabetic laboratories including comprehensive metabolic panel, hemoglobin A1c, urine microalbumin, lipid panel    Annual eye examination recommended

## 2020-02-26 ENCOUNTER — TELEPHONE (OUTPATIENT)
Dept: INTERNAL MEDICINE CLINIC | Facility: CLINIC | Age: 75
End: 2020-02-26

## 2020-02-26 NOTE — TELEPHONE ENCOUNTER
I spoke with the pt there is a referral for Pain Management with Dr Kanu Ely    She will contact him for the referral

## 2020-02-28 ENCOUNTER — OFFICE VISIT (OUTPATIENT)
Dept: PODIATRY | Facility: CLINIC | Age: 75
End: 2020-02-28
Payer: COMMERCIAL

## 2020-02-28 VITALS — HEIGHT: 61 IN | BODY MASS INDEX: 36.67 KG/M2 | WEIGHT: 194.2 LBS

## 2020-02-28 DIAGNOSIS — B35.1 ONYCHOMYCOSIS: ICD-10-CM

## 2020-02-28 DIAGNOSIS — E11.42 DIABETIC POLYNEUROPATHY ASSOCIATED WITH TYPE 2 DIABETES MELLITUS (HCC): Primary | ICD-10-CM

## 2020-02-28 DIAGNOSIS — L85.1 ACQUIRED KERATODERMA: ICD-10-CM

## 2020-02-28 PROCEDURE — 11056 PARNG/CUTG B9 HYPRKR LES 2-4: CPT | Performed by: PODIATRIST

## 2020-02-28 PROCEDURE — 11721 DEBRIDE NAIL 6 OR MORE: CPT | Performed by: PODIATRIST

## 2020-02-28 NOTE — PROGRESS NOTES
PATIENT:  Joo Goldman  1945    ASSESSMENT/PLAN:  1  Diabetic polyneuropathy associated with type 2 diabetes mellitus ([address] )     2  Onychomycosis  Debridement   3  Acquired keratoderma  Lesion Destruction              Disease prevention and related risk factors of diabetes were identified and discussed  The patient was educated in proper foot wear for diabetics  Also educated in daily foot assessment and routine diabetic foot care  Discussed the importance of controlling BS through diet and exercise  The patient will follow up in 9 weeks for further diabetic foot exam and care     PROCEDURE:  All mycotic toenails were reduced and debrided in length, width, and girth using a nail nipper and dremel  All hyperkeratotic skin lesion(s) were sharply pared with a scalpel with no bleeding or evidence of ulceration  Patient tolerated procedure(s) well without complications  HPI:  Joo Goldman is a 69 y  o year old female seen for diabetic foot exam   The patient has class findings and diabetic neuropathy  BS is under control  The patient complained of thick toenails and calluses causing pain  She has some numbness and paresthesia in her feet  The patient denied any acute pedal disorder, redness, acute swelling, or recent injury            PAST MEDICAL HISTORY:  Past Medical History:   Diagnosis Date    Acute embolism and thrombosis of unspecified deep veins of unspecified lower extremity (HCC)     Last Assessed:  5/18/17    Anemia     Anosmia     Anxiety     Arthritis     Asthma     Back pain     Bilateral macular retinal edema     CAD (coronary artery disease)     Cataract     Cervical disc herniation     Cervical radiculopathy     Cervical spinal stenosis     Cervical spondylolysis     Chronic mastoiditis     Complex endometrial hyperplasia     Depression     Diabetes mellitus (HCC)     DVT (deep venous thrombosis) (HCC)     Fibromyalgia     Hyperlipidemia     Hypertension     Hypothyroid     Lumbar radiculopathy     Obese     Spinal stenosis     Stomach ulcer     Thyroid disease        PAST SURGICAL HISTORY:  Past Surgical History:   Procedure Laterality Date    BACK SURGERY      CARPAL TUNNEL RELEASE      CATARACT EXTRACTION      CHOLECYSTECTOMY      COLONOSCOPY      CORONARY ANGIOPLASTY      CORONARY ARTERY BYPASS GRAFT      CYSTOSCOPY N/A 6/20/2017    Procedure: Memory Sports;  Surgeon: Brayan Langley MD;  Location: [facility];  Service: Gynecology Oncology    GA LAPAROSCOPY W TOT HYSTERECTUTERUS <=250 GRAM  W TUBE/OVARY N/A 6/20/2017    Procedure: ROBOTIC HYSTERECTOMY; BILATERAL SALPINO-OOPHERECTOMY; umbilical hernia repair ;  Surgeon: Brayan Langley MD;  Location: [facility];  Service: Gynecology Oncology    TONSILECTOMY AND ADNOIDECTOMY      UMBILICAL HERNIA REPAIR          ALLERGIES:  Molds & smuts;  Other; and Pollen extract    MEDICATIONS:  Current Outpatient Medications   Medication Sig Dispense Refill    albuterol (2 5 mg/3 mL) 0 083 % nebulizer solution Take 3 mL (2 5 mg total) by nebulization every 6 (six) hours as needed for wheezing 75 mL 0    albuterol (PROAIR HFA) 90 mcg/act inhaler Inhale 2 puffs every 6 (six) hours as needed       ALPRAZolam (XANAX) 0 5 mg tablet Take 1 tablet (0 5 mg total) by mouth 2 (two) times a day as needed for anxiety 60 tablet 1    aspirin 81 MG tablet Take 81 mg by mouth daily      BAQSIMI TWO PACK 3 MG/DOSE POWD Use as directed  0    cholecalciferol (VITAMIN D3) 1,000 units tablet Take 2,000 Units by mouth daily      Co-Enzyme Q-10 100 MG CAPS Take 1 capsule by mouth 2 (two) times a day        diclofenac sodium (VOLTAREN) 1 % Apply 2 g topically 4 (four) times a day (Patient taking differently: Apply 2 g topically as needed ) 1 Tube 0    diclofenac sodium (VOLTAREN) 1 % Apply 2 g topically 4 (four) times a day 1 Tube 1    diltiazem (TIAZAC) 300 MG 24 hr capsule TAKE 1 CAPSULE BY MOUTH EVERY DAY IN THE MORNING  2    DULoxetine (CYMBALTA) 60 mg delayed release capsule Take 1 capsule (60 mg total) by mouth every morning 30 capsule 0    enalapril (VASOTEC) 20 mg tablet Take 20 mg by mouth daily      fluticasone (FLONASE) 50 mcg/act nasal spray 2 sprays into each nostril daily 16 g 0    insulin aspart (NovoLOG) 100 units/mL injection Inject 12 Units under the skin 3 (three) times a day before meals (Patient taking differently: Inject under the skin 3 (three) times a day before meals )  0    Insulin Disposable Pump (OMNIPOD DASH 5 PACK) MISC CHANGE PODS EVERY 2 DAYS UTD  3    isosorbide mononitrate (IMDUR) 30 mg 24 hr tablet TAKE 1 TABLET DAILY AT BEDTIME, 30 DAYS, #30, 4 REFILLS, EXTENDED RELEASE   6    levothyroxine 50 mcg tablet Take 50 mcg by mouth daily      metFORMIN (GLUCOPHAGE) 500 mg tablet Take 500 mg by mouth 2 (two) times a day with meals      nitroglycerin (NITROLINGUAL) 0 4 mg/spray spray USE ONE SPRAY Q 5 MINUTES AS NEEDED FOR CHEST PAIN  IF NO RELIEF, CALL 911   1    rosuvastatin (CRESTOR) 20 MG tablet Take 20 mg by mouth every evening  2    tiZANidine (ZANAFLEX) 2 mg tablet Take 2 mg by mouth daily at bedtime       No current facility-administered medications for this visit          SOCIAL HISTORY:  Social History     Socioeconomic History    Marital status: /Civil Union     Spouse name: None    Number of children: 2    Years of education: High school or GED    Highest education level: None   Occupational History    Occupation: Retired   Social Needs    Financial resource strain: None    Food insecurity:     Worry: None     Inability: None    Transportation needs:     Medical: None     Non-medical: None   Tobacco Use    Smoking status: Former Smoker     Packs/day: 1 00     Years: 6 00     Pack years: 6 00     Types: Cigarettes     Last attempt to quit:      Years since quittin 1    Smokeless tobacco: Never Used    Tobacco comment: Smoked about a half a pack for about 4 yrs  Quite about 50 yrs ago  Substance and Sexual Activity    Alcohol use: No    Drug use: No    Sexual activity: Never     Comment: No known STD risk factors   Lifestyle    Physical activity:     Days per week: None     Minutes per session: None    Stress: None   Relationships    Social connections:     Talks on phone: None     Gets together: None     Attends Gnosticist service: None     Active member of club or organization: None     Attends meetings of clubs or organizations: None     Relationship status: None    Intimate partner violence:     Fear of current or ex partner: None     Emotionally abused: None     Physically abused: None     Forced sexual activity: None   Other Topics Concern    None   Social History Narrative    Lives independently with spouse    No known risk factors    Denied:  Exercising regularly        REVIEW OF SYSTEMS:  GENERAL: NAD, afebrile  HEART: No chest pain, or palpitation  RESPIRATORY:  No acute SOB or cough  GI: No Nausea, vomit or diarrhea  NEUROLOGIC: No syncope or acute weakness    PHYSICAL EXAM:  VASCULAR EXAM  Dorsalis pedis  +1, Posterior tibial artery  absent  The patient has class findings with skin atrophy, lack of digital hair, and nail dystrophy  There is +1 lower extremity edema bilaterally  Venous stasis skin changes noted BLE  NEUROLOGIC EXAM  Sensation is intact to light touch  Sensation is intact to 10gm monofilament  Decreased vibratory sensation in her feet  No focal neurologic deficit  DERMATOLOGIC EXAM:   No ulcer or cellulitis noted  The patient has hypertrophic toenails with discoloration, onycholysis, and subungal debris  No notable skin lesion  Patient has multiple punctate keratosis in bilateral submet 5  MUSCULOSKELETAL EXAM:   No acute joint pain, edema, or redness  No acute musculoskeletal problem  Patient has deformity including hammertoe  Diabetic Foot Exam    Patient's shoes and socks removed  Right Foot/Ankle   Right Foot Inspection  Skin Exam: skin normal, skin intact and dry skin no warmth, no callus, no erythema, no maceration, no abnormal color, no pre-ulcer, no ulcer and no callus                          Toe Exam: right toe deformityno swelling, no tenderness and erythema  Sensory   Vibration: diminished  Proprioception: intact   Monofilament testing: intact  Vascular  Capillary refills: < 3 seconds  The right DP pulse is 1+  The right PT pulse is 0  Left Foot/Ankle  Left Foot Inspection  Skin Exam: skin normal, skin intact, dry skin and callusno warmth, no erythema, no maceration, normal color, no pre-ulcer and no ulcer                         Toe Exam: left toe deformityno swelling, no tenderness and no erythema                   Sensory   Vibration: diminished  Proprioception: intact  Monofilament: intact  Vascular  Capillary refills: < 3 seconds  The left DP pulse is 1+  The left PT pulse is 0  Assign Risk Category:  Deformity present;  No loss of protective sensation; Weak pulses       Risk: 1

## 2020-03-06 ENCOUNTER — TELEPHONE (OUTPATIENT)
Dept: PSYCHIATRY | Facility: CLINIC | Age: 75
End: 2020-03-06

## 2020-03-10 ENCOUNTER — OFFICE VISIT (OUTPATIENT)
Dept: NEPHROLOGY | Facility: CLINIC | Age: 75
End: 2020-03-10
Payer: COMMERCIAL

## 2020-03-10 VITALS
HEIGHT: 61 IN | DIASTOLIC BLOOD PRESSURE: 64 MMHG | SYSTOLIC BLOOD PRESSURE: 138 MMHG | WEIGHT: 198.8 LBS | BODY MASS INDEX: 37.53 KG/M2 | HEART RATE: 64 BPM

## 2020-03-10 DIAGNOSIS — Z79.4 TYPE 2 DIABETES MELLITUS WITH COMPLICATION, WITH LONG-TERM CURRENT USE OF INSULIN (HCC): Chronic | ICD-10-CM

## 2020-03-10 DIAGNOSIS — F41.9 ANXIETY: ICD-10-CM

## 2020-03-10 DIAGNOSIS — I10 ESSENTIAL HYPERTENSION: Chronic | ICD-10-CM

## 2020-03-10 DIAGNOSIS — F41.9 ANXIETY: Primary | Chronic | ICD-10-CM

## 2020-03-10 DIAGNOSIS — N18.30 CHRONIC RENAL IMPAIRMENT, STAGE 3 (MODERATE) (HCC): Chronic | ICD-10-CM

## 2020-03-10 DIAGNOSIS — E11.8 TYPE 2 DIABETES MELLITUS WITH COMPLICATION, WITH LONG-TERM CURRENT USE OF INSULIN (HCC): Chronic | ICD-10-CM

## 2020-03-10 PROCEDURE — 3075F SYST BP GE 130 - 139MM HG: CPT | Performed by: NURSE PRACTITIONER

## 2020-03-10 PROCEDURE — 99214 OFFICE O/P EST MOD 30 MIN: CPT | Performed by: NURSE PRACTITIONER

## 2020-03-10 PROCEDURE — 3078F DIAST BP <80 MM HG: CPT | Performed by: NURSE PRACTITIONER

## 2020-03-10 PROCEDURE — 3066F NEPHROPATHY DOC TX: CPT | Performed by: GENERAL ACUTE CARE HOSPITAL

## 2020-03-10 PROCEDURE — 1160F RVW MEDS BY RX/DR IN RCRD: CPT | Performed by: NURSE PRACTITIONER

## 2020-03-10 PROCEDURE — 3066F NEPHROPATHY DOC TX: CPT | Performed by: NURSE PRACTITIONER

## 2020-03-10 PROCEDURE — 3008F BODY MASS INDEX DOCD: CPT | Performed by: NURSE PRACTITIONER

## 2020-03-10 PROCEDURE — 4040F PNEUMOC VAC/ADMIN/RCVD: CPT | Performed by: NURSE PRACTITIONER

## 2020-03-10 PROCEDURE — 1036F TOBACCO NON-USER: CPT | Performed by: NURSE PRACTITIONER

## 2020-03-10 PROCEDURE — 1170F FXNL STATUS ASSESSED: CPT | Performed by: NURSE PRACTITIONER

## 2020-03-10 RX ORDER — DULOXETIN HYDROCHLORIDE 60 MG/1
CAPSULE, DELAYED RELEASE ORAL
Qty: 30 CAPSULE | Refills: 0 | Status: SHIPPED | OUTPATIENT
Start: 2020-03-10 | End: 2020-04-23 | Stop reason: SDUPTHER

## 2020-03-10 NOTE — PROGRESS NOTES
NEPHROLOGY OFFICE VISIT   Neptali Kearney 69 y o  female MRN: [id number]  3/10/2020    Reason for Visit:  Follow-up    INTERVAL HISTORY and SUBJECTIVE:    I had the pleasure of seeing Bee Morales today in the renal clinic for the continued management of CKD and microalbuminuria  She was previously followed by Dr Shola Gan for management  Her last office visit was October 2017  Since our last visit, there has been no ER visits or hospitilizations  Additional life stressors at this time with anxiety  Patient denies any chest pain, shortness of breath and swelling  No urinary symptoms  No use of NSAIDS  Chronic pain issues- diclofenac helps  Balance issues- falls easily  The last blood work was done on 3/6/2020, which we have reviewed together  ASSESSMENT AND PLAN:  1  Chronic kidney disease, likely stage III:  · Follow-up with Dr Arreola-per hospital consult  · Creatinine near 1 2 in 2017  · Creatinine 1 4 in 2018  · No creatinine levels in 2019  · Current creatinine 1 7 mg/dL  · No use of NSAIDS  · TSH WNL  · Follow up blood work in 4 weeks and then in 8 weeks to establish baseline  · Renal US with PVR  · Good glycemic control essential  · Reports once a week use of diclofenac- May need to discontinue use of Diclofenac  2  Proteinuria:  · Microalbumin/creatinine ratio showed an increase in 10/2019 to 164 mg/g; prior levels 49 mg/g in 2018  · Check urine protein creatinine ratio before next appointment  · On enalapril  3  Diabetes mellitus:    · On insulin  With neuropathy  · Last hemoglobin A1c 9 0  · Follows with eye specialist, ?diabetic eye damage  4  Spinal stenosis:    · Failed pain management  · Patient scheduled for follow-up with Neurosurgery, Dr Sami Ruffin  · Use of diclofenac for pain relief  5  Anxiety/depression:   · On cymbalta  · Follow up with psychiatry for medication adjustment  6  Pulmonary nodules: follows with pulmonary      PATIENT INSTRUCTIONS:    Patient Instructions   1   Have kidney ultrasound done  2  Blood work in 4 weeks  3  Blood work and urine studies in May  4  Follow up with Dr Maribel Hernández in May  5  Good blood sugar control essential  6  No NSAIDS: no Motrin, aleve, advil, ibuprophen        Orders Placed This Encounter   Procedures    US kidney and bladder with pvr     Standing Status:   Future     Standing Expiration Date:   3/10/2024     Scheduling Instructions:      13 years and Up - 1)  Full bladder required  2)  Please drink 24-32 oz of water 1 hour prior to appointment time  3)  No voiding 1 hour prior to appointment time  "Prep required if being scheduled in conjunction withother studies, refer to those examination's Preps first before scheduling  Patient must drink 24 oz of water 60 minutes before your scheduled appointment time  This test requires you to have a FULL bladder  Please donot urinate 1 hour before your appointment time  Patient is not to urinate until their Ultrasound is completed  Bladder filling is a key part of this study       and needs to be full for accurate imaging during this exam             Please bring your insurance cards, a form of photo ID and a list of your medications with you  Arrive 15 minutes prior to your appointment time in order to register  If you need to have lab work or aurinalysis, please do this AFTER your ultrasound  "     Order Specific Question:   Is a Renal Artery Doppler also being requested in addition to the Kidney/Renal ultrasound ?      Answer:   No    Renal function panel     Standing Status:   Future     Standing Expiration Date:   3/10/2021    CBC     Standing Status:   Future     Standing Expiration Date:   7/10/2020    Renal function panel     Standing Status:   Future     Standing Expiration Date:   7/10/2020    PTH, intact     Standing Status:   Future     Standing Expiration Date:   7/10/2020    Urinalysis with microscopic     Standing Status:   Future     Standing Expiration Date: 7/10/2020    Protein / creatinine ratio, urine     Standing Status:   Future     Standing Expiration Date:   7/10/2020    Vitamin D 25 hydroxy     Standing Status:   Future     Standing Expiration Date:   7/10/2020    Magnesium     Standing Status:   Future     Standing Expiration Date:   7/10/2020       OBJECTIVE:  Current Weight: Weight - Scale: 90 2 kg (198 lb 12 8 oz)  Vitals:    03/10/20 1339 03/10/20 1355 03/10/20 1356 03/10/20 1423   BP:  134/56 134/64 138/64   BP Location:  Right arm Left arm Right arm   Patient Position:  Sitting Sitting Standing   Cuff Size:  Large Large Large   Pulse:   64    Weight: 90 2 kg (198 lb 12 8 oz)      Height: 5' 1" (1 549 m)       Body mass index is 37 56 kg/m²  REVIEW OF SYSTEMS:    Review of Systems   Constitutional: Negative  HENT: Negative  Respiratory: Negative  Cardiovascular: Negative for chest pain, palpitations and leg swelling  Gastrointestinal: Negative  Genitourinary: Negative  Musculoskeletal: Positive for arthralgias, back pain and myalgias  Skin: Negative  Neurological: Negative  Hematological: Does not bruise/bleed easily  Psychiatric/Behavioral: The patient is nervous/anxious  PHYSICAL EXAM:      Physical Exam   Constitutional: She is oriented to person, place, and time  She appears well-developed  No distress  HENT:   Head: Normocephalic and atraumatic  Mouth/Throat: Oropharynx is clear and moist    Eyes: Conjunctivae and EOM are normal  Right eye exhibits no discharge  Left eye exhibits no discharge  No scleral icterus  Neck: Normal range of motion  Neck supple  No JVD present  Carotid bruit is not present  No tracheal deviation present  Cardiovascular: Normal rate, regular rhythm and intact distal pulses  Exam reveals no gallop and no friction rub  No murmur heard  Pulmonary/Chest: Effort normal and breath sounds normal  No stridor  No respiratory distress  She has no wheezes  She has no rales  Abdominal: Soft  Bowel sounds are normal  She exhibits no distension and no mass  There is no tenderness  There is no rebound and no guarding  Musculoskeletal: Normal range of motion  She exhibits no edema, tenderness or deformity  Neurological: She is alert and oriented to person, place, and time  Skin: Skin is warm and dry  Capillary refill takes less than 2 seconds  No rash noted  She is not diaphoretic  No erythema  No pallor  Psychiatric: She has a normal mood and affect   Her behavior is normal  Judgment and thought content normal        Medications:    Current Outpatient Medications:     ALPRAZolam (XANAX) 0 5 mg tablet, Take 1 tablet (0 5 mg total) by mouth 2 (two) times a day as needed for anxiety, Disp: 60 tablet, Rfl: 1    aspirin 81 MG tablet, Take 81 mg by mouth daily, Disp: , Rfl:     cholecalciferol (VITAMIN D3) 1,000 units tablet, Take 2,000 Units by mouth daily, Disp: , Rfl:     Co-Enzyme Q-10 100 MG CAPS, Take 1 capsule by mouth 2 (two) times a day  , Disp: , Rfl:     diclofenac sodium (VOLTAREN) 1 %, Apply 2 g topically 4 (four) times a day (Patient taking differently: Apply 2 g topically as needed ), Disp: 1 Tube, Rfl: 0    diltiazem (TIAZAC) 300 MG 24 hr capsule, TAKE 1 CAPSULE BY MOUTH EVERY DAY IN THE MORNING, Disp: , Rfl: 2    DULoxetine (CYMBALTA) 60 mg delayed release capsule, Take 1 capsule (60 mg total) by mouth every morning, Disp: 30 capsule, Rfl: 0    enalapril (VASOTEC) 20 mg tablet, Take 20 mg by mouth daily, Disp: , Rfl:     insulin aspart (NovoLOG) 100 units/mL injection, Inject 12 Units under the skin 3 (three) times a day before meals (Patient taking differently: Inject under the skin 3 (three) times a day before meals ), Disp: , Rfl: 0    Insulin Disposable Pump (OMNIPOD DASH 5 PACK) MISC, CHANGE PODS EVERY 2 DAYS UTD, Disp: , Rfl: 3    isosorbide mononitrate (IMDUR) 30 mg 24 hr tablet, TAKE 1 TABLET DAILY AT BEDTIME, 30 DAYS, #30, 4 REFILLS, EXTENDED RELEASE , Disp: , Rfl: 6    levothyroxine 50 mcg tablet, Take 50 mcg by mouth daily, Disp: , Rfl:     nitroglycerin (NITROLINGUAL) 0 4 mg/spray spray, USE ONE SPRAY Q 5 MINUTES AS NEEDED FOR CHEST PAIN  IF NO RELIEF, CALL 911 , Disp: , Rfl: 1    rosuvastatin (CRESTOR) 20 MG tablet, Take 20 mg by mouth every evening, Disp: , Rfl: 2    tiZANidine (ZANAFLEX) 2 mg tablet, Take 2 mg by mouth daily at bedtime, Disp: , Rfl:     albuterol (2 5 mg/3 mL) 0 083 % nebulizer solution, Take 3 mL (2 5 mg total) by nebulization every 6 (six) hours as needed for wheezing (Patient not taking: Reported on 3/10/2020), Disp: 75 mL, Rfl: 0    albuterol (PROAIR HFA) 90 mcg/act inhaler, Inhale 2 puffs every 6 (six) hours as needed , Disp: , Rfl:     BAQSIMI TWO PACK 3 MG/DOSE POWD, Use as directed, Disp: , Rfl: 0    diclofenac sodium (VOLTAREN) 1 %, Apply 2 g topically 4 (four) times a day (Patient not taking: Reported on 3/10/2020), Disp: 1 Tube, Rfl: 1    fluticasone (FLONASE) 50 mcg/act nasal spray, 2 sprays into each nostril daily (Patient not taking: Reported on 3/10/2020), Disp: 16 g, Rfl: 0    metFORMIN (GLUCOPHAGE) 500 mg tablet, Take 500 mg by mouth 2 (two) times a day with meals, Disp: , Rfl:     Laboratory Results:        Invalid input(s): ALBUMIN    Results for orders placed or performed in visit on 10/01/19   Microalbumin / creatinine urine ratio   Result Value Ref Range    Creatinine, Ur 111 0 mg/dL    Microalbum  ,U,Random 182 0 (H) 0 0 - 20 0 mg/L    Microalb Creat Ratio 164 (H) 0 - 30 mg/g creatinine

## 2020-03-10 NOTE — PATIENT INSTRUCTIONS
1  Have kidney ultrasound done  2  Blood work in 4 weeks  3  Blood work and urine studies in May  4  Follow up with Dr Radha Arizmendi in May  5  Good blood sugar control essential  6   No NSAIDS: no Motrin, aleve, advil, ibuprophen

## 2020-03-23 ENCOUNTER — TELEPHONE (OUTPATIENT)
Dept: PULMONOLOGY | Facility: CLINIC | Age: 75
End: 2020-03-23

## 2020-03-23 NOTE — TELEPHONE ENCOUNTER
Due to the current pandemic of COVID-19 Patient was given the option to par take in a video/ telephone call which was accepted by the patient  Patient was informed via telephone the following:    Patients preferred phone number to contact: [phone]    Video option -Patients preferred e-mail:    Patient informed that they will receive an e-mail 15 minutes before their scheduled time as a reminder       Any further questions patients may have they are informed to call [phone]

## 2020-03-24 ENCOUNTER — TELEMEDICINE (OUTPATIENT)
Dept: SLEEP CENTER | Facility: CLINIC | Age: 75
End: 2020-03-24
Payer: COMMERCIAL

## 2020-03-24 DIAGNOSIS — G47.33 OSA (OBSTRUCTIVE SLEEP APNEA): Primary | ICD-10-CM

## 2020-03-24 DIAGNOSIS — J30.89 ALLERGIC RHINITIS DUE TO OTHER ALLERGIC TRIGGER, UNSPECIFIED SEASONALITY: ICD-10-CM

## 2020-03-24 PROCEDURE — 99213 OFFICE O/P EST LOW 20 MIN: CPT | Performed by: INTERNAL MEDICINE

## 2020-03-24 PROCEDURE — 1160F RVW MEDS BY RX/DR IN RCRD: CPT | Performed by: INTERNAL MEDICINE

## 2020-03-24 NOTE — LETTER
March 25, 2020     Carrie Blackwell, [address]  [address] [facility] [address]    Patient: Rhonda Almeida   YOB: 1945   Date of Visit: 3/24/2020       Dear Dr Mina Chopra Recipients: Thank you for referring Rhonda Almeida to me for evaluation  Below are my notes for this consultation  If you have questions, please do not hesitate to call me  I look forward to following your patient along with you  Sincerely,        Christoph Hannah DO        CC: No Recipients  Christoph Hannah DO  3/25/2020  1:02 PM  Sign at close encounter    Virtual Brief Telephone Visit       Reason for visit somnolence , pulmonary nodules  Encounter provider Christoph Hannah DO    Provider located at [address] [place] [id number]      Recent Visits  No visits were found meeting these conditions  Showing recent visits within past 7 days and meeting all other requirements     Future Appointments  No visits were found meeting these conditions  Showing future appointments within next 150 days and meeting all other requirements        After connecting through [employer], the patient was identified by name and date of birth  Rhonda Almeida was informed that this is a telemedicine visit and that the visit is being conducted through telephone which may not be secure and therefore, might not be HIPAA-compliant  My office door was closed  No one else was in the room  She acknowledged consent and understanding of privacy and security of the video platform  The patient has agreed to participate and understands they can discontinue the visit at any time  Subjective  Rhonda Almeida called in for follow up on her somnolence  She states that she has stopped using Xanax and has found the sleepiness to improve  She does still note some anxiety but feels that this is not too problematic for her to deal with    She still has not interest in PAP therapy at this time  Otherwise she is feeling well  She denies dyspnea, wheezing, chest tightness or cough  She does have a bit of a runny nose  She still has her cats but has no desire to get rid of them        Past Medical History:   Diagnosis Date    Acute embolism and thrombosis of unspecified deep veins of unspecified lower extremity (HCC)     Last Assessed:  5/18/17    Anemia     Anosmia     Anxiety     Arthritis     Asthma     Back pain     Bilateral macular retinal edema     CAD (coronary artery disease)     Cataract     Cervical disc herniation     Cervical radiculopathy     Cervical spinal stenosis     Cervical spondylolysis     Chronic mastoiditis     Complex endometrial hyperplasia     Depression     Diabetes mellitus ([address] )     [facility])     Fibromyalgia     Hyperlipidemia     Hypertension     Hypothyroid     Lumbar radiculopathy     Obese     Spinal stenosis     Stomach ulcer     Thyroid disease        Past Surgical History:   Procedure Laterality Date    BACK SURGERY      CARPAL TUNNEL RELEASE      CATARACT EXTRACTION      CHOLECYSTECTOMY      COLONOSCOPY      CORONARY ANGIOPLASTY      CORONARY ARTERY BYPASS GRAFT      CYSTOSCOPY N/A 6/20/2017    Procedure: CYSTOSCOPY;  Surgeon: Kianna Angela MD;  [facility];  Service: Gynecology Oncology    TX LAPAROSCOPY W TOT HYSTERECTUTERUS <=250 GRAM  W TUBE/OVARY N/A 6/20/2017    Procedure: ROBOTIC HYSTERECTOMY; BILATERAL SALPINO-OOPHERECTOMY; umbilical hernia repair ;  Surgeon: Kianna Angela MD;  [facility];  Service: Gynecology Oncology    TONSILECTOMY AND ADNOIDECTOMY      UMBILICAL HERNIA REPAIR         Current Outpatient Medications   Medication Sig Dispense Refill    albuterol (2 5 mg/3 mL) 0 083 % nebulizer solution Take 3 mL (2 5 mg total) by nebulization every 6 (six) hours as needed for wheezing (Patient not taking: Reported on 3/10/2020) 75 mL 0    albuterol (PROAIR HFA) 90 mcg/act inhaler Inhale 2 puffs every 6 (six) hours as needed       ALPRAZolam (XANAX) 0 5 mg tablet Take 1 tablet (0 5 mg total) by mouth 2 (two) times a day as needed for anxiety 60 tablet 1    aspirin 81 MG tablet Take 81 mg by mouth daily      BAQSIMI TWO PACK 3 MG/DOSE POWD Use as directed  0    cholecalciferol (VITAMIN D3) 1,000 units tablet Take 2,000 Units by mouth daily      Co-Enzyme Q-10 100 MG CAPS Take 1 capsule by mouth 2 (two) times a day        diclofenac sodium (VOLTAREN) 1 % Apply 2 g topically 4 (four) times a day (Patient taking differently: Apply 2 g topically as needed ) 1 Tube 0    diclofenac sodium (VOLTAREN) 1 % Apply 2 g topically 4 (four) times a day (Patient not taking: Reported on 3/10/2020) 1 Tube 1    diltiazem (TIAZAC) 300 MG 24 hr capsule TAKE 1 CAPSULE BY MOUTH EVERY DAY IN THE MORNING  2    DULoxetine (CYMBALTA) 60 mg delayed release capsule TAKE 1 CAPSULE BY MOUTH EVERY MORNING 30 capsule 0    enalapril (VASOTEC) 20 mg tablet Take 20 mg by mouth daily      fluticasone (FLONASE) 50 mcg/act nasal spray 2 sprays into each nostril daily (Patient not taking: Reported on 3/10/2020) 16 g 0    insulin aspart (NovoLOG) 100 units/mL injection Inject 12 Units under the skin 3 (three) times a day before meals (Patient taking differently: Inject under the skin 3 (three) times a day before meals )  0    Insulin Disposable Pump (OMNIPOD DASH 5 PACK) MISC CHANGE PODS EVERY 2 DAYS UTD  3    isosorbide mononitrate (IMDUR) 30 mg 24 hr tablet TAKE 1 TABLET DAILY AT BEDTIME, 30 DAYS, #30, 4 REFILLS, EXTENDED RELEASE   6    levothyroxine 50 mcg tablet Take 50 mcg by mouth daily      metFORMIN (GLUCOPHAGE) 500 mg tablet Take 500 mg by mouth 2 (two) times a day with meals      nitroglycerin (NITROLINGUAL) 0 4 mg/spray spray USE ONE SPRAY Q 5 MINUTES AS NEEDED FOR CHEST PAIN   IF NO RELIEF, CALL 911   1    rosuvastatin (CRESTOR) 20 MG tablet Take 20 mg by mouth every evening  2    tiZANidine (ZANAFLEX) 2 mg tablet Take 2 mg by mouth daily at bedtime       No current facility-administered medications for this visit  Allergies   Allergen Reactions    Molds & Smuts     Other      RAGWEED, CAT DANDER, DOG DANDER     Pollen Extract Other (See Comments)     Cold symptoms         Review of Systems   Constitutional: Negative  HENT: Negative  Respiratory: Negative  Cardiovascular: Negative  Gastrointestinal: Negative  Endocrine: Negative  Genitourinary: Negative  Musculoskeletal: Negative  Neurological: Negative  Hematological: Negative  Psychiatric/Behavioral: Negative  Assessment and Plan  41-year-old female with past medical history of DVT, diabetes, CAD, anemia, anxiety, cervical radiculopathy, fibromyalgia, and depression who comes in for follow-up of asthma, allergic rhinitis and pulmonary nodules      1  Somnolence -this has improved a bit with her holding her Xanax  She finds that she is a bit more alert  She also had iron deficiency anemia and is not taking any supplementation for that  This could be the cause for fatigue  Will check iron studies and supplement as needed  -   We also discussed NIV therapy again and she has no interest at all in using it  We will not reorder or schedule ehr sleep study as she has no interest in therapy  She continues to avoid it despite my explanation that it is contributing to sleepiness, cardiac disease among other complications        2    Pulmonary nodules in RML - stable over 2 years  [place] Sham benign   No additional testing        3   Allergic rhinitis/seasonal allergies-history of anaphylactic shock with cats previously in the house   Previously doing allergy shots approximately 3 times a week       - She has a mild runny nose but is otherwise unaffected  She is not interested in taking any inhalers or nasal sprays at this time      4   Moderate restrictive lung disease secondary to obesity- I again encouraged weight loss      Follow in 1 year  I have personally spent 5 minutes reviewing the chart and imaging prior to the visit  I have personally spent 9 minutes on the phone with the patient  I have personally spent 6 minutes reviewing imaging, laboratory results and other testing results with the patient

## 2020-03-25 ENCOUNTER — HOSPITAL ENCOUNTER (OUTPATIENT)
Dept: ULTRASOUND IMAGING | Facility: HOSPITAL | Age: 75
Discharge: HOME | End: 2020-03-25
Payer: COMMERCIAL

## 2020-03-25 ENCOUNTER — TELEPHONE (OUTPATIENT)
Dept: RHEUMATOLOGY | Facility: CLINIC | Age: 75
End: 2020-03-25

## 2020-03-25 DIAGNOSIS — Z79.4 TYPE 2 DIABETES MELLITUS WITH COMPLICATION, WITH LONG-TERM CURRENT USE OF INSULIN (HCC): ICD-10-CM

## 2020-03-25 DIAGNOSIS — I10 ESSENTIAL HYPERTENSION: ICD-10-CM

## 2020-03-25 DIAGNOSIS — F41.9 ANXIETY: ICD-10-CM

## 2020-03-25 DIAGNOSIS — N18.30 CHRONIC RENAL IMPAIRMENT, STAGE 3 (MODERATE) (HCC): ICD-10-CM

## 2020-03-25 DIAGNOSIS — E11.8 TYPE 2 DIABETES MELLITUS WITH COMPLICATION, WITH LONG-TERM CURRENT USE OF INSULIN (HCC): ICD-10-CM

## 2020-03-25 PROCEDURE — 51798 US URINE CAPACITY MEASURE: CPT

## 2020-03-25 NOTE — PROGRESS NOTES
Virtual Brief Telephone Visit       Reason for visit somnolence , pulmonary nodules  Encounter provider Isabel Barron DO    Provider located at [address] [place] [phone]      Recent Visits  No visits were found meeting these conditions  Showing recent visits within past 7 days and meeting all other requirements     Future Appointments  No visits were found meeting these conditions  Showing future appointments within next 150 days and meeting all other requirements        After connecting through [employer], the patient was identified by name and date of birth  Makenzie Kilpatrick was informed that this is a telemedicine visit and that the visit is being conducted through telephone which may not be secure and therefore, might not be HIPAA-compliant  My office door was closed  No one else was in the room  She acknowledged consent and understanding of privacy and security of the video platform  The patient has agreed to participate and understands they can discontinue the visit at any time  Subjective  Makenzie Kilpatrick called in for follow up on her somnolence  She states that she has stopped using Xanax and has found the sleepiness to improve  She does still note some anxiety but feels that this is not too problematic for her to deal with  She still has not interest in PAP therapy at this time  Otherwise she is feeling well  She denies dyspnea, wheezing, chest tightness or cough  She does have a bit of a runny nose  She still has her cats but has no desire to get rid of them        Past Medical History:   Diagnosis Date    Acute embolism and thrombosis of unspecified deep veins of unspecified lower extremity (HCC)     Last Assessed:  5/18/17    Anemia     Anosmia     Anxiety     Arthritis     Asthma     Back pain     Bilateral macular retinal edema     CAD (coronary artery disease)     Cataract     Cervical disc herniation     Cervical radiculopathy     Cervical spinal stenosis     Cervical spondylolysis     Chronic mastoiditis     Complex endometrial hyperplasia     Depression     Diabetes mellitus ([address] )     [facility])     Fibromyalgia     Hyperlipidemia     Hypertension     Hypothyroid     Lumbar radiculopathy     Obese     Spinal stenosis     Stomach ulcer     Thyroid disease        Past Surgical History:   Procedure Laterality Date    BACK SURGERY      CARPAL TUNNEL RELEASE      CATARACT EXTRACTION      CHOLECYSTECTOMY      COLONOSCOPY      CORONARY ANGIOPLASTY      CORONARY ARTERY BYPASS GRAFT      CYSTOSCOPY N/A 6/20/2017    Procedure: Kevin Arriola;  Surgeon: Tiara Li MD;  [facility];  Service: Gynecology Oncology    MT LAPAROSCOPY W TOT HYSTERECTUTERUS <=250 [address]ringer  W TUBE/OVARY N/A 6/20/2017    Procedure: ROBOTIC HYSTERECTOMY; BILATERAL SALPINO-OOPHERECTOMY; umbilical hernia repair ;  Surgeon: Tiara Li MD;  [facility];  Service: Gynecology Oncology    TONSILECTOMY AND ADNOIDECTOMY      UMBILICAL HERNIA REPAIR         Current Outpatient Medications   Medication Sig Dispense Refill    albuterol (2 5 mg/3 mL) 0 083 % nebulizer solution Take 3 mL (2 5 mg total) by nebulization every 6 (six) hours as needed for wheezing (Patient not taking: Reported on 3/10/2020) 75 mL 0    albuterol (PROAIR HFA) 90 mcg/act inhaler Inhale 2 puffs every 6 (six) hours as needed       ALPRAZolam (XANAX) 0 5 mg tablet Take 1 tablet (0 5 mg total) by mouth 2 (two) times a day as needed for anxiety 60 tablet 1    aspirin 81 MG tablet Take 81 mg by mouth daily      BAQSIMI TWO PACK 3 MG/DOSE POWD Use as directed  0    cholecalciferol (VITAMIN D3) 1,000 units tablet Take 2,000 Units by mouth daily      Co-Enzyme Q-10 100 MG CAPS Take 1 capsule by mouth 2 (two) times a day        diclofenac sodium (VOLTAREN) 1 % Apply 2 g topically 4 (four) times a day (Patient taking differently: Apply 2 g topically as needed ) 1 Tube 0    diclofenac sodium (VOLTAREN) 1 % Apply 2 g topically 4 (four) times a day (Patient not taking: Reported on 3/10/2020) 1 Tube 1    diltiazem (TIAZAC) 300 MG 24 hr capsule TAKE 1 CAPSULE BY MOUTH EVERY DAY IN THE MORNING  2    DULoxetine (CYMBALTA) 60 mg delayed release capsule TAKE 1 CAPSULE BY MOUTH EVERY MORNING 30 capsule 0    enalapril (VASOTEC) 20 mg tablet Take 20 mg by mouth daily      fluticasone (FLONASE) 50 mcg/act nasal spray 2 sprays into each nostril daily (Patient not taking: Reported on 3/10/2020) 16 g 0    insulin aspart (NovoLOG) 100 units/mL injection Inject 12 Units under the skin 3 (three) times a day before meals (Patient taking differently: Inject under the skin 3 (three) times a day before meals )  0    Insulin Disposable Pump (OMNIPOD DASH 5 PACK) MISC CHANGE PODS EVERY 2 DAYS UTD  3    isosorbide mononitrate (IMDUR) 30 mg 24 hr tablet TAKE 1 TABLET DAILY AT BEDTIME, 30 DAYS, #30, 4 REFILLS, EXTENDED RELEASE   6    levothyroxine 50 mcg tablet Take 50 mcg by mouth daily      metFORMIN (GLUCOPHAGE) 500 mg tablet Take 500 mg by mouth 2 (two) times a day with meals      nitroglycerin (NITROLINGUAL) 0 4 mg/spray spray USE ONE SPRAY Q 5 MINUTES AS NEEDED FOR CHEST PAIN  IF NO RELIEF, CALL 911   1    rosuvastatin (CRESTOR) 20 MG tablet Take 20 mg by mouth every evening  2    tiZANidine (ZANAFLEX) 2 mg tablet Take 2 mg by mouth daily at bedtime       No current facility-administered medications for this visit  Allergies   Allergen Reactions    Molds & Smuts     Other      RAGWEED, CAT DANDER, DOG DANDER     Pollen Extract Other (See Comments)     Cold symptoms         Review of Systems   Constitutional: Negative  HENT: Negative  Respiratory: Negative  Cardiovascular: Negative  Gastrointestinal: Negative  Endocrine: Negative  Genitourinary: Negative  Musculoskeletal: Negative  Neurological: Negative  Hematological: Negative  Psychiatric/Behavioral: Negative  Assessment and Plan  76-year-old female with past medical history of DVT, diabetes, CAD, anemia, anxiety, cervical radiculopathy, fibromyalgia, and depression who comes in for follow-up of asthma, allergic rhinitis and pulmonary nodules      1  Somnolence -this has improved a bit with her holding her Xanax  She finds that she is a bit more alert  She also had iron deficiency anemia and is not taking any supplementation for that  This could be the cause for fatigue  Will check iron studies and supplement as needed  -   We also discussed NIV therapy again and she has no interest at all in using it  We will not reorder or schedule ehr sleep study as she has no interest in therapy  She continues to avoid it despite my explanation that it is contributing to sleepiness, cardiac disease among other complications        2    Pulmonary nodules in RML - stable over 2 years  [place]an benign   No additional testing        3   Allergic rhinitis/seasonal allergies-history of anaphylactic shock with cats previously in the house   Previously doing allergy shots approximately 3 times a week       - She has a mild runny nose but is otherwise unaffected  She is not interested in taking any inhalers or nasal sprays at this time  4   Moderate restrictive lung disease secondary to obesity- I again encouraged weight loss      Follow in 1 year  I have personally spent 5 minutes reviewing the chart and imaging prior to the visit  I have personally spent 9 minutes on the phone with the patient  I have personally spent 6 minutes reviewing imaging, laboratory results and other testing results with the patient

## 2020-03-27 ENCOUNTER — TELEPHONE (OUTPATIENT)
Dept: NEPHROLOGY | Facility: CLINIC | Age: 75
End: 2020-03-27

## 2020-03-27 NOTE — TELEPHONE ENCOUNTER
----- Message from [address] [place] [address] [place] sent at 3/27/2020  9:09 AM EDT -----  Renal US reviewed  Please let Feliberto Oleary know that the kidney ultrasound did not show any abnormalities  Kidneys are normal sized; no blockages noted  Small cysts noted that are simple and not to be worried about

## 2020-04-06 DIAGNOSIS — F41.9 ANXIETY: ICD-10-CM

## 2020-04-06 RX ORDER — ALPRAZOLAM 0.5 MG/1
0.5 TABLET ORAL 2 TIMES DAILY PRN
Qty: 60 TABLET | Refills: 1 | Status: SHIPPED | OUTPATIENT
Start: 2020-04-06 | End: 2020-06-04 | Stop reason: SDUPTHER

## 2020-04-17 ENCOUNTER — TELEPHONE (OUTPATIENT)
Dept: NEPHROLOGY | Facility: CLINIC | Age: 75
End: 2020-04-17

## 2020-04-17 LAB
ALBUMIN SERPL-MCNC: 4.1 G/DL (ref 3.6–5.1)
BUN SERPL-MCNC: 19 MG/DL (ref 7–25)
BUN/CREAT SERPL: 13 (CALC) (ref 6–22)
CALCIUM SERPL-MCNC: 9.4 MG/DL (ref 8.6–10.4)
CHLORIDE SERPL-SCNC: 104 MMOL/L (ref 98–110)
CO2 SERPL-SCNC: 28 MMOL/L (ref 20–32)
CREAT SERPL-MCNC: 1.41 MG/DL (ref 0.6–0.93)
FERRITIN SERPL-MCNC: 11 NG/ML (ref 16–288)
GLUCOSE SERPL-MCNC: 146 MG/DL (ref 65–99)
IRON SATN MFR SERPL: 13 % (CALC) (ref 16–45)
IRON SERPL-MCNC: 53 MCG/DL (ref 45–160)
MAGNESIUM SERPL-MCNC: 1.6 MG/DL (ref 1.5–2.5)
PHOSPHATE SERPL-MCNC: 3.9 MG/DL (ref 2.1–4.3)
POTASSIUM SERPL-SCNC: 4.5 MMOL/L (ref 3.5–5.3)
SL AMB EGFR AFRICAN AMERICAN: 43 ML/MIN/1.73M2
SL AMB EGFR NON AFRICAN AMERICAN: 37 ML/MIN/1.73M2
SODIUM SERPL-SCNC: 137 MMOL/L (ref 135–146)
TIBC SERPL-MCNC: 422 MCG/DL (CALC) (ref 250–450)

## 2020-04-23 ENCOUNTER — OFFICE VISIT (OUTPATIENT)
Dept: PSYCHIATRY | Facility: CLINIC | Age: 75
End: 2020-04-23
Payer: COMMERCIAL

## 2020-04-23 DIAGNOSIS — F33.2 MDD (MAJOR DEPRESSIVE DISORDER), RECURRENT SEVERE, WITHOUT PSYCHOSIS (HCC): ICD-10-CM

## 2020-04-23 DIAGNOSIS — F41.1 GAD (GENERALIZED ANXIETY DISORDER): Primary | ICD-10-CM

## 2020-04-23 DIAGNOSIS — F41.0 PANIC ATTACKS: ICD-10-CM

## 2020-04-23 DIAGNOSIS — F41.9 ANXIETY: ICD-10-CM

## 2020-04-23 PROCEDURE — 1036F TOBACCO NON-USER: CPT | Performed by: PSYCHIATRY & NEUROLOGY

## 2020-04-23 PROCEDURE — 90792 PSYCH DIAG EVAL W/MED SRVCS: CPT | Performed by: PSYCHIATRY & NEUROLOGY

## 2020-04-23 RX ORDER — DULOXETIN HYDROCHLORIDE 30 MG/1
CAPSULE, DELAYED RELEASE ORAL
Qty: 30 CAPSULE | Refills: 0 | Status: SHIPPED | OUTPATIENT
Start: 2020-04-23 | End: 2020-05-19

## 2020-04-23 RX ORDER — ESCITALOPRAM OXALATE 10 MG/1
TABLET ORAL
Qty: 30 TABLET | Refills: 1 | Status: SHIPPED | OUTPATIENT
Start: 2020-04-23 | End: 2020-05-19

## 2020-05-10 DIAGNOSIS — F41.9 ANXIETY: ICD-10-CM

## 2020-05-11 DIAGNOSIS — F41.9 ANXIETY: ICD-10-CM

## 2020-05-11 RX ORDER — DULOXETIN HYDROCHLORIDE 60 MG/1
CAPSULE, DELAYED RELEASE ORAL
Qty: 90 CAPSULE | Refills: 1 | Status: SHIPPED | OUTPATIENT
Start: 2020-05-11 | End: 2020-05-19

## 2020-05-11 RX ORDER — DULOXETIN HYDROCHLORIDE 60 MG/1
CAPSULE, DELAYED RELEASE ORAL
Qty: 30 CAPSULE | Refills: 0 | Status: SHIPPED | OUTPATIENT
Start: 2020-05-11 | End: 2020-05-11

## 2020-05-18 DIAGNOSIS — F41.9 ANXIETY: ICD-10-CM

## 2020-05-18 DIAGNOSIS — F41.1 GAD (GENERALIZED ANXIETY DISORDER): ICD-10-CM

## 2020-05-18 DIAGNOSIS — F33.2 MDD (MAJOR DEPRESSIVE DISORDER), RECURRENT SEVERE, WITHOUT PSYCHOSIS (HCC): ICD-10-CM

## 2020-05-19 ENCOUNTER — TELEPHONE (OUTPATIENT)
Dept: NEPHROLOGY | Facility: CLINIC | Age: 75
End: 2020-05-19

## 2020-05-19 ENCOUNTER — TELEPHONE (OUTPATIENT)
Dept: PSYCHIATRY | Facility: CLINIC | Age: 75
End: 2020-05-19

## 2020-05-19 DIAGNOSIS — F33.2 MDD (MAJOR DEPRESSIVE DISORDER), RECURRENT SEVERE, WITHOUT PSYCHOSIS (HCC): ICD-10-CM

## 2020-05-19 DIAGNOSIS — F41.1 GAD (GENERALIZED ANXIETY DISORDER): ICD-10-CM

## 2020-05-19 RX ORDER — ESCITALOPRAM OXALATE 10 MG/1
TABLET ORAL
Qty: 90 TABLET | Refills: 1 | Status: SHIPPED | OUTPATIENT
Start: 2020-05-19 | End: 2020-05-19 | Stop reason: SDUPTHER

## 2020-05-19 RX ORDER — ESCITALOPRAM OXALATE 10 MG/1
TABLET ORAL
Qty: 90 TABLET | Refills: 1 | Status: SHIPPED | OUTPATIENT
Start: 2020-05-19 | End: 2020-06-17

## 2020-05-19 RX ORDER — ESCITALOPRAM OXALATE 10 MG/1
TABLET ORAL
Qty: 30 TABLET | Refills: 1 | Status: SHIPPED | OUTPATIENT
Start: 2020-05-19 | End: 2020-05-19

## 2020-05-19 RX ORDER — DULOXETIN HYDROCHLORIDE 30 MG/1
CAPSULE, DELAYED RELEASE ORAL
Qty: 30 CAPSULE | Refills: 0 | OUTPATIENT
Start: 2020-05-19

## 2020-05-28 LAB
25(OH)D3 SERPL-MCNC: 33 NG/ML (ref 30–100)
ALBUMIN SERPL-MCNC: 4.1 G/DL (ref 3.6–5.1)
BASOPHILS # BLD AUTO: 42 CELLS/UL (ref 0–200)
BASOPHILS NFR BLD AUTO: 0.5 %
BUN SERPL-MCNC: 20 MG/DL (ref 7–25)
BUN/CREAT SERPL: 13 (CALC) (ref 6–22)
CALCIUM SERPL-MCNC: 9.5 MG/DL (ref 8.6–10.4)
CALCIUM SERPL-MCNC: 9.5 MG/DL (ref 8.6–10.4)
CHLORIDE SERPL-SCNC: 105 MMOL/L (ref 98–110)
CO2 SERPL-SCNC: 24 MMOL/L (ref 20–32)
CREAT SERPL-MCNC: 1.6 MG/DL (ref 0.6–0.93)
EOSINOPHIL # BLD AUTO: 67 CELLS/UL (ref 15–500)
EOSINOPHIL NFR BLD AUTO: 0.8 %
ERYTHROCYTE [DISTWIDTH] IN BLOOD BY AUTOMATED COUNT: 15.4 % (ref 11–15)
GLUCOSE SERPL-MCNC: 225 MG/DL (ref 65–99)
HCT VFR BLD AUTO: 34.1 % (ref 35–45)
HGB BLD-MCNC: 10.5 G/DL (ref 11.7–15.5)
LYMPHOCYTES # BLD AUTO: 2083 CELLS/UL (ref 850–3900)
LYMPHOCYTES NFR BLD AUTO: 24.8 %
MAGNESIUM SERPL-MCNC: 1.8 MG/DL (ref 1.5–2.5)
MCH RBC QN AUTO: 26.1 PG (ref 27–33)
MCHC RBC AUTO-ENTMCNC: 30.8 G/DL (ref 32–36)
MCV RBC AUTO: 84.8 FL (ref 80–100)
MONOCYTES # BLD AUTO: 630 CELLS/UL (ref 200–950)
MONOCYTES NFR BLD AUTO: 7.5 %
NEUTROPHILS # BLD AUTO: 5578 CELLS/UL (ref 1500–7800)
NEUTROPHILS NFR BLD AUTO: 66.4 %
PHOSPHATE SERPL-MCNC: 3.4 MG/DL (ref 2.1–4.3)
PLATELET # BLD AUTO: 252 THOUSAND/UL (ref 140–400)
PMV BLD REES-ECKER: 11.4 FL (ref 7.5–12.5)
POTASSIUM SERPL-SCNC: 4.3 MMOL/L (ref 3.5–5.3)
PTH-INTACT SERPL-MCNC: 61 PG/ML (ref 14–64)
RBC # BLD AUTO: 4.02 MILLION/UL (ref 3.8–5.1)
SL AMB EGFR AFRICAN AMERICAN: 37 ML/MIN/1.73M2
SL AMB EGFR NON AFRICAN AMERICAN: 32 ML/MIN/1.73M2
SODIUM SERPL-SCNC: 137 MMOL/L (ref 135–146)
WBC # BLD AUTO: 8.4 THOUSAND/UL (ref 3.8–10.8)

## 2020-05-29 ENCOUNTER — HOSPITAL ENCOUNTER (EMERGENCY)
Facility: HOSPITAL | Age: 75
Discharge: HOME | End: 2020-05-29
Attending: EMERGENCY MEDICINE | Admitting: EMERGENCY MEDICINE
Payer: COMMERCIAL

## 2020-05-29 ENCOUNTER — TELEPHONE (OUTPATIENT)
Dept: INTERNAL MEDICINE CLINIC | Facility: CLINIC | Age: 75
End: 2020-05-29

## 2020-05-29 ENCOUNTER — APPOINTMENT (EMERGENCY)
Dept: RADIOLOGY | Facility: HOSPITAL | Age: 75
End: 2020-05-29
Payer: COMMERCIAL

## 2020-05-29 ENCOUNTER — APPOINTMENT (EMERGENCY)
Dept: ULTRASOUND IMAGING | Facility: HOSPITAL | Age: 75
End: 2020-05-29
Payer: COMMERCIAL

## 2020-05-29 VITALS
DIASTOLIC BLOOD PRESSURE: 72 MMHG | SYSTOLIC BLOOD PRESSURE: 170 MMHG | OXYGEN SATURATION: 96 % | TEMPERATURE: 99.4 F | RESPIRATION RATE: 16 BRPM | BODY MASS INDEX: 40.87 KG/M2 | WEIGHT: 216.49 LBS | HEIGHT: 61 IN | HEART RATE: 72 BPM

## 2020-05-29 DIAGNOSIS — M25.561 ACUTE PAIN OF RIGHT KNEE: ICD-10-CM

## 2020-05-29 DIAGNOSIS — M79.605 LEFT LEG PAIN: Primary | ICD-10-CM

## 2020-05-29 DIAGNOSIS — M17.11 PRIMARY LOCALIZED OSTEOARTHRITIS OF RIGHT KNEE: Primary | ICD-10-CM

## 2020-05-29 LAB
APPEARANCE UR: CLEAR
BACTERIA UR QL AUTO: ABNORMAL /HPF
BILIRUB UR QL STRIP: NEGATIVE
COLOR UR: YELLOW
CREAT UR-MCNC: 120 MG/DL (ref 20–275)
GLUCOSE UR QL STRIP: NEGATIVE
GRAN CASTS #/AREA URNS LPF: ABNORMAL /LPF
HGB UR QL STRIP: NEGATIVE
HYALINE CASTS #/AREA URNS LPF: ABNORMAL /LPF
KETONES UR QL STRIP: NEGATIVE
LEUKOCYTE ESTERASE UR QL STRIP: NEGATIVE
NITRITE UR QL STRIP: NEGATIVE
PH UR STRIP: ABNORMAL [PH] (ref 5–8)
PROT UR QL STRIP: ABNORMAL
PROT UR-MCNC: 102 MG/DL (ref 5–24)
PROT/CREAT UR: 0.85 MG/MG CREAT (ref 0.02–0.16)
PROT/CREAT UR: 850 MG/G CREAT (ref 21–161)
RBC #/AREA URNS HPF: ABNORMAL /HPF
SP GR UR STRIP: 1.02 (ref 1–1.03)
SQUAMOUS #/AREA URNS HPF: ABNORMAL /HPF
WBC #/AREA URNS HPF: ABNORMAL /HPF

## 2020-05-29 PROCEDURE — 93971 EXTREMITY STUDY: CPT | Performed by: SURGERY

## 2020-05-29 PROCEDURE — 93971 EXTREMITY STUDY: CPT

## 2020-05-29 PROCEDURE — 3061F NEG MICROALBUMINURIA REV: CPT | Performed by: GENERAL ACUTE CARE HOSPITAL

## 2020-05-29 PROCEDURE — 99284 EMERGENCY DEPT VISIT MOD MDM: CPT | Performed by: EMERGENCY MEDICINE

## 2020-05-29 PROCEDURE — 99284 EMERGENCY DEPT VISIT MOD MDM: CPT

## 2020-05-29 PROCEDURE — 73560 X-RAY EXAM OF KNEE 1 OR 2: CPT

## 2020-05-29 RX ORDER — ACETAMINOPHEN 325 MG/1
650 TABLET ORAL ONCE
Status: COMPLETED | OUTPATIENT
Start: 2020-05-29 | End: 2020-05-29

## 2020-05-29 RX ADMIN — ACETAMINOPHEN 650 MG: 325 TABLET, FILM COATED ORAL at 12:50

## 2020-06-04 ENCOUNTER — TELEPHONE (OUTPATIENT)
Dept: INTERNAL MEDICINE CLINIC | Facility: CLINIC | Age: 75
End: 2020-06-04

## 2020-06-04 DIAGNOSIS — F41.9 ANXIETY: ICD-10-CM

## 2020-06-04 RX ORDER — DULOXETIN HYDROCHLORIDE 30 MG/1
30 CAPSULE, DELAYED RELEASE ORAL DAILY
Qty: 30 CAPSULE | Refills: 1 | Status: SHIPPED | OUTPATIENT
Start: 2020-06-04 | End: 2020-06-22 | Stop reason: SDUPTHER

## 2020-06-04 RX ORDER — DULOXETIN HYDROCHLORIDE 60 MG/1
60 CAPSULE, DELAYED RELEASE ORAL DAILY
Qty: 30 CAPSULE | Refills: 1 | Status: SHIPPED | OUTPATIENT
Start: 2020-06-04 | End: 2020-06-05 | Stop reason: SDUPTHER

## 2020-06-04 RX ORDER — ALPRAZOLAM 0.5 MG/1
0.5 TABLET ORAL 2 TIMES DAILY PRN
Qty: 60 TABLET | Refills: 1 | Status: SHIPPED | OUTPATIENT
Start: 2020-06-04 | End: 2020-09-10 | Stop reason: SDUPTHER

## 2020-06-05 ENCOUNTER — TELEMEDICINE (OUTPATIENT)
Dept: NEPHROLOGY | Facility: CLINIC | Age: 75
End: 2020-06-05
Payer: COMMERCIAL

## 2020-06-05 DIAGNOSIS — E55.9 VITAMIN D DEFICIENCY: ICD-10-CM

## 2020-06-05 DIAGNOSIS — N18.30 CHRONIC RENAL IMPAIRMENT, STAGE 3 (MODERATE) (HCC): Primary | Chronic | ICD-10-CM

## 2020-06-05 DIAGNOSIS — I10 ESSENTIAL HYPERTENSION: Chronic | ICD-10-CM

## 2020-06-05 PROCEDURE — 99443 PR PHYS/QHP TELEPHONE EVALUATION 21-30 MIN: CPT | Performed by: NURSE PRACTITIONER

## 2020-06-11 ENCOUNTER — TELEPHONE (OUTPATIENT)
Dept: INTERNAL MEDICINE CLINIC | Facility: CLINIC | Age: 75
End: 2020-06-11

## 2020-06-12 RX ORDER — INSULIN GLARGINE 100 [IU]/ML
INJECTION, SOLUTION SUBCUTANEOUS
COMMUNITY
Start: 2020-06-05 | End: 2020-06-17

## 2020-06-12 RX ORDER — PEN NEEDLE, DIABETIC 31 GX5/16"
NEEDLE, DISPOSABLE MISCELLANEOUS
COMMUNITY
Start: 2020-06-05

## 2020-06-15 ENCOUNTER — OFFICE VISIT (OUTPATIENT)
Dept: INTERNAL MEDICINE CLINIC | Facility: CLINIC | Age: 75
End: 2020-06-15
Payer: COMMERCIAL

## 2020-06-15 VITALS
OXYGEN SATURATION: 98 % | TEMPERATURE: 98.3 F | HEART RATE: 80 BPM | DIASTOLIC BLOOD PRESSURE: 62 MMHG | HEIGHT: 61 IN | BODY MASS INDEX: 37.8 KG/M2 | SYSTOLIC BLOOD PRESSURE: 138 MMHG | RESPIRATION RATE: 16 BRPM | WEIGHT: 200.2 LBS

## 2020-06-15 DIAGNOSIS — R53.83 OTHER FATIGUE: ICD-10-CM

## 2020-06-15 DIAGNOSIS — F33.2 MDD (MAJOR DEPRESSIVE DISORDER), RECURRENT SEVERE, WITHOUT PSYCHOSIS (HCC): ICD-10-CM

## 2020-06-15 DIAGNOSIS — E66.9 CLASS 2 OBESITY WITH BODY MASS INDEX (BMI) OF 39.0 TO 39.9 IN ADULT, UNSPECIFIED OBESITY TYPE, UNSPECIFIED WHETHER SERIOUS COMORBIDITY PRESENT: ICD-10-CM

## 2020-06-15 DIAGNOSIS — E11.8 TYPE 2 DIABETES MELLITUS WITH COMPLICATION, WITH LONG-TERM CURRENT USE OF INSULIN (HCC): ICD-10-CM

## 2020-06-15 DIAGNOSIS — I10 ESSENTIAL HYPERTENSION: Chronic | ICD-10-CM

## 2020-06-15 DIAGNOSIS — F32.A DEPRESSION, UNSPECIFIED DEPRESSION TYPE: ICD-10-CM

## 2020-06-15 DIAGNOSIS — E03.9 HYPOTHYROIDISM, UNSPECIFIED TYPE: ICD-10-CM

## 2020-06-15 DIAGNOSIS — Z12.31 ENCOUNTER FOR SCREENING MAMMOGRAM FOR BREAST CANCER: ICD-10-CM

## 2020-06-15 DIAGNOSIS — Z11.59 ENCOUNTER FOR HEPATITIS C SCREENING TEST FOR LOW RISK PATIENT: Primary | ICD-10-CM

## 2020-06-15 DIAGNOSIS — Z79.4 TYPE 2 DIABETES MELLITUS WITH COMPLICATION, WITH LONG-TERM CURRENT USE OF INSULIN (HCC): ICD-10-CM

## 2020-06-15 PROCEDURE — 1036F TOBACCO NON-USER: CPT | Performed by: INTERNAL MEDICINE

## 2020-06-15 PROCEDURE — 4040F PNEUMOC VAC/ADMIN/RCVD: CPT | Performed by: INTERNAL MEDICINE

## 2020-06-15 PROCEDURE — 1160F RVW MEDS BY RX/DR IN RCRD: CPT | Performed by: INTERNAL MEDICINE

## 2020-06-15 PROCEDURE — 3066F NEPHROPATHY DOC TX: CPT | Performed by: INTERNAL MEDICINE

## 2020-06-15 PROCEDURE — 99214 OFFICE O/P EST MOD 30 MIN: CPT | Performed by: INTERNAL MEDICINE

## 2020-06-15 PROCEDURE — 3008F BODY MASS INDEX DOCD: CPT | Performed by: INTERNAL MEDICINE

## 2020-06-15 PROCEDURE — 3075F SYST BP GE 130 - 139MM HG: CPT | Performed by: INTERNAL MEDICINE

## 2020-06-15 PROCEDURE — 3078F DIAST BP <80 MM HG: CPT | Performed by: INTERNAL MEDICINE

## 2020-06-17 ENCOUNTER — TELEPHONE (OUTPATIENT)
Dept: PSYCHIATRY | Facility: CLINIC | Age: 75
End: 2020-06-17

## 2020-06-17 ENCOUNTER — CONSULT (OUTPATIENT)
Dept: PAIN MEDICINE | Facility: CLINIC | Age: 75
End: 2020-06-17
Payer: COMMERCIAL

## 2020-06-17 VITALS
HEART RATE: 76 BPM | WEIGHT: 194 LBS | SYSTOLIC BLOOD PRESSURE: 132 MMHG | TEMPERATURE: 98.5 F | BODY MASS INDEX: 36.66 KG/M2 | DIASTOLIC BLOOD PRESSURE: 58 MMHG

## 2020-06-17 DIAGNOSIS — G89.29 CHRONIC PAIN OF RIGHT KNEE: ICD-10-CM

## 2020-06-17 DIAGNOSIS — M51.16 INTERVERTEBRAL DISC DISORDER WITH RADICULOPATHY OF LUMBAR REGION: Primary | ICD-10-CM

## 2020-06-17 DIAGNOSIS — M17.11 PRIMARY OSTEOARTHRITIS OF RIGHT KNEE: ICD-10-CM

## 2020-06-17 DIAGNOSIS — M25.561 CHRONIC PAIN OF RIGHT KNEE: ICD-10-CM

## 2020-06-17 PROCEDURE — 3066F NEPHROPATHY DOC TX: CPT | Performed by: ANESTHESIOLOGY

## 2020-06-17 PROCEDURE — 1160F RVW MEDS BY RX/DR IN RCRD: CPT | Performed by: ANESTHESIOLOGY

## 2020-06-17 PROCEDURE — 4040F PNEUMOC VAC/ADMIN/RCVD: CPT | Performed by: ANESTHESIOLOGY

## 2020-06-17 PROCEDURE — 3075F SYST BP GE 130 - 139MM HG: CPT | Performed by: ANESTHESIOLOGY

## 2020-06-17 PROCEDURE — 99214 OFFICE O/P EST MOD 30 MIN: CPT | Performed by: ANESTHESIOLOGY

## 2020-06-17 PROCEDURE — 1036F TOBACCO NON-USER: CPT | Performed by: ANESTHESIOLOGY

## 2020-06-17 PROCEDURE — 3078F DIAST BP <80 MM HG: CPT | Performed by: ANESTHESIOLOGY

## 2020-06-22 ENCOUNTER — TELEMEDICINE (OUTPATIENT)
Dept: PSYCHIATRY | Facility: CLINIC | Age: 75
End: 2020-06-22
Payer: COMMERCIAL

## 2020-06-22 DIAGNOSIS — F41.9 ANXIETY: ICD-10-CM

## 2020-06-22 DIAGNOSIS — F41.1 GAD (GENERALIZED ANXIETY DISORDER): ICD-10-CM

## 2020-06-22 DIAGNOSIS — F33.2 MDD (MAJOR DEPRESSIVE DISORDER), RECURRENT SEVERE, WITHOUT PSYCHOSIS (HCC): Primary | ICD-10-CM

## 2020-06-22 PROCEDURE — 99442 PR PHYS/QHP TELEPHONE EVALUATION 11-20 MIN: CPT | Performed by: PSYCHIATRY & NEUROLOGY

## 2020-06-22 RX ORDER — DULOXETIN HYDROCHLORIDE 60 MG/1
60 CAPSULE, DELAYED RELEASE ORAL DAILY
Qty: 30 CAPSULE | Refills: 2 | Status: SHIPPED | OUTPATIENT
Start: 2020-06-22 | End: 2020-08-31 | Stop reason: SDUPTHER

## 2020-06-26 ENCOUNTER — APPOINTMENT (OUTPATIENT)
Dept: RADIOLOGY | Facility: AMBULARY SURGERY CENTER | Age: 75
End: 2020-06-26
Attending: ORTHOPAEDIC SURGERY
Payer: COMMERCIAL

## 2020-06-26 ENCOUNTER — OFFICE VISIT (OUTPATIENT)
Dept: OBGYN CLINIC | Facility: CLINIC | Age: 75
End: 2020-06-26
Payer: COMMERCIAL

## 2020-06-26 VITALS
WEIGHT: 194 LBS | SYSTOLIC BLOOD PRESSURE: 134 MMHG | HEART RATE: 72 BPM | DIASTOLIC BLOOD PRESSURE: 71 MMHG | BODY MASS INDEX: 36.63 KG/M2 | HEIGHT: 61 IN

## 2020-06-26 DIAGNOSIS — M25.561 RIGHT KNEE PAIN, UNSPECIFIED CHRONICITY: ICD-10-CM

## 2020-06-26 DIAGNOSIS — M17.11 PRIMARY OSTEOARTHRITIS OF RIGHT KNEE: Primary | ICD-10-CM

## 2020-06-26 DIAGNOSIS — M25.561 CHRONIC PAIN OF RIGHT KNEE: ICD-10-CM

## 2020-06-26 DIAGNOSIS — G89.29 CHRONIC PAIN OF RIGHT KNEE: ICD-10-CM

## 2020-06-26 PROCEDURE — 3066F NEPHROPATHY DOC TX: CPT | Performed by: ORTHOPAEDIC SURGERY

## 2020-06-26 PROCEDURE — 1160F RVW MEDS BY RX/DR IN RCRD: CPT | Performed by: ORTHOPAEDIC SURGERY

## 2020-06-26 PROCEDURE — 3075F SYST BP GE 130 - 139MM HG: CPT | Performed by: ORTHOPAEDIC SURGERY

## 2020-06-26 PROCEDURE — 73562 X-RAY EXAM OF KNEE 3: CPT

## 2020-06-26 PROCEDURE — 99213 OFFICE O/P EST LOW 20 MIN: CPT | Performed by: ORTHOPAEDIC SURGERY

## 2020-06-26 PROCEDURE — 3078F DIAST BP <80 MM HG: CPT | Performed by: ORTHOPAEDIC SURGERY

## 2020-06-26 PROCEDURE — 1036F TOBACCO NON-USER: CPT | Performed by: ORTHOPAEDIC SURGERY

## 2020-06-26 PROCEDURE — 4040F PNEUMOC VAC/ADMIN/RCVD: CPT | Performed by: ORTHOPAEDIC SURGERY

## 2020-06-26 PROCEDURE — 3008F BODY MASS INDEX DOCD: CPT | Performed by: ORTHOPAEDIC SURGERY

## 2020-06-27 ENCOUNTER — HOSPITAL ENCOUNTER (OUTPATIENT)
Dept: RADIOLOGY | Facility: HOSPITAL | Age: 75
Discharge: HOME | End: 2020-06-27
Attending: ANESTHESIOLOGY
Payer: COMMERCIAL

## 2020-06-27 DIAGNOSIS — M51.16 INTERVERTEBRAL DISC DISORDER WITH RADICULOPATHY OF LUMBAR REGION: ICD-10-CM

## 2020-06-27 DIAGNOSIS — F41.9 ANXIETY: ICD-10-CM

## 2020-06-27 PROCEDURE — 72148 MRI LUMBAR SPINE W/O DYE: CPT

## 2020-06-27 RX ORDER — DULOXETIN HYDROCHLORIDE 30 MG/1
30 CAPSULE, DELAYED RELEASE ORAL DAILY
Qty: 30 CAPSULE | Refills: 1 | Status: SHIPPED | OUTPATIENT
Start: 2020-06-27 | End: 2020-07-13

## 2020-06-29 ENCOUNTER — TELEPHONE (OUTPATIENT)
Dept: PAIN MEDICINE | Facility: CLINIC | Age: 75
End: 2020-06-29

## 2020-06-30 ENCOUNTER — TELEPHONE (OUTPATIENT)
Dept: RADIOLOGY | Facility: CLINIC | Age: 75
End: 2020-06-30

## 2020-07-13 ENCOUNTER — OFFICE VISIT (OUTPATIENT)
Dept: PAIN MEDICINE | Facility: CLINIC | Age: 75
End: 2020-07-13
Payer: COMMERCIAL

## 2020-07-13 VITALS
HEART RATE: 75 BPM | TEMPERATURE: 98.9 F | WEIGHT: 198 LBS | SYSTOLIC BLOOD PRESSURE: 133 MMHG | BODY MASS INDEX: 37.41 KG/M2 | DIASTOLIC BLOOD PRESSURE: 61 MMHG

## 2020-07-13 DIAGNOSIS — G89.4 CHRONIC PAIN SYNDROME: Primary | ICD-10-CM

## 2020-07-13 DIAGNOSIS — M47.816 LUMBAR SPONDYLOSIS: ICD-10-CM

## 2020-07-13 DIAGNOSIS — M51.16 INTERVERTEBRAL DISC DISORDER WITH RADICULOPATHY OF LUMBAR REGION: ICD-10-CM

## 2020-07-13 PROCEDURE — 1160F RVW MEDS BY RX/DR IN RCRD: CPT | Performed by: ANESTHESIOLOGY

## 2020-07-13 PROCEDURE — 3078F DIAST BP <80 MM HG: CPT | Performed by: ANESTHESIOLOGY

## 2020-07-13 PROCEDURE — 4040F PNEUMOC VAC/ADMIN/RCVD: CPT | Performed by: ANESTHESIOLOGY

## 2020-07-13 PROCEDURE — 3075F SYST BP GE 130 - 139MM HG: CPT | Performed by: ANESTHESIOLOGY

## 2020-07-13 PROCEDURE — 99214 OFFICE O/P EST MOD 30 MIN: CPT | Performed by: ANESTHESIOLOGY

## 2020-07-13 PROCEDURE — 1036F TOBACCO NON-USER: CPT | Performed by: ANESTHESIOLOGY

## 2020-07-13 PROCEDURE — 3066F NEPHROPATHY DOC TX: CPT | Performed by: ANESTHESIOLOGY

## 2020-07-13 RX ORDER — DILTIAZEM HYDROCHLORIDE 300 MG/1
300 CAPSULE, EXTENDED RELEASE ORAL DAILY
COMMUNITY
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
## 2020-07-17 ENCOUNTER — TELEPHONE (OUTPATIENT)
Dept: RADIOLOGY | Facility: CLINIC | Age: 75
End: 2020-07-17

## 2020-07-17 NOTE — TELEPHONE ENCOUNTER
Pt to be [employer] SCS trial with Dr Radha Asher  Emailed Jenni Cushing to contact pt for education

## 2020-07-29 ENCOUNTER — SOCIAL WORK (OUTPATIENT)
Dept: BEHAVIORAL/MENTAL HEALTH CLINIC | Facility: CLINIC | Age: 75
End: 2020-07-29
Payer: COMMERCIAL

## 2020-07-29 DIAGNOSIS — F41.9 ANXIETY: Primary | ICD-10-CM

## 2020-07-29 PROCEDURE — 98968 PH1 ASSMT&MGMT NQHP 21-30: CPT | Performed by: SOCIAL WORKER
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
## 2020-07-29 NOTE — PATIENT INSTRUCTIONS
Processed stressors with her- supportive therapy provided  Encouraged her to make her needs primary and carve out time during day for herself and her enjoyment  Aware people around her not likely to change  Encouraged continued contact and activity with grandchildren as they are the most positive and supportive people around her

## 2020-08-24 ENCOUNTER — OFFICE VISIT (OUTPATIENT)
Dept: INTERNAL MEDICINE CLINIC | Facility: CLINIC | Age: 75
End: 2020-08-24
Payer: COMMERCIAL

## 2020-08-24 VITALS
WEIGHT: 195.2 LBS | DIASTOLIC BLOOD PRESSURE: 58 MMHG | OXYGEN SATURATION: 94 % | HEART RATE: 81 BPM | SYSTOLIC BLOOD PRESSURE: 146 MMHG | RESPIRATION RATE: 16 BRPM | TEMPERATURE: 97.8 F | BODY MASS INDEX: 36.85 KG/M2 | HEIGHT: 61 IN

## 2020-08-24 DIAGNOSIS — S81.811A LACERATION OF RIGHT LOWER LEG, INITIAL ENCOUNTER: Primary | ICD-10-CM

## 2020-08-24 DIAGNOSIS — G47.00 INSOMNIA, UNSPECIFIED TYPE: ICD-10-CM

## 2020-08-24 PROCEDURE — 90715 TDAP VACCINE 7 YRS/> IM: CPT

## 2020-08-24 PROCEDURE — 1160F RVW MEDS BY RX/DR IN RCRD: CPT | Performed by: GENERAL ACUTE CARE HOSPITAL

## 2020-08-24 PROCEDURE — 3077F SYST BP >= 140 MM HG: CPT | Performed by: GENERAL ACUTE CARE HOSPITAL

## 2020-08-24 PROCEDURE — 3078F DIAST BP <80 MM HG: CPT | Performed by: GENERAL ACUTE CARE HOSPITAL

## 2020-08-24 PROCEDURE — 3008F BODY MASS INDEX DOCD: CPT | Performed by: GENERAL ACUTE CARE HOSPITAL

## 2020-08-24 PROCEDURE — 99213 OFFICE O/P EST LOW 20 MIN: CPT | Performed by: GENERAL ACUTE CARE HOSPITAL

## 2020-08-24 PROCEDURE — 4040F PNEUMOC VAC/ADMIN/RCVD: CPT | Performed by: GENERAL ACUTE CARE HOSPITAL

## 2020-08-24 PROCEDURE — 1036F TOBACCO NON-USER: CPT | Performed by: GENERAL ACUTE CARE HOSPITAL

## 2020-08-24 PROCEDURE — 90471 IMMUNIZATION ADMIN: CPT

## 2020-08-24 PROCEDURE — 3066F NEPHROPATHY DOC TX: CPT | Performed by: GENERAL ACUTE CARE HOSPITAL

## 2020-08-24 RX ORDER — BACITRACIN ZINC AND POLYMYXIN B SULFATE 500; 1000 [USP'U]/G; [USP'U]/G
OINTMENT TOPICAL 2 TIMES DAILY
Qty: 15 G | Refills: 0 | Status: SHIPPED | OUTPATIENT
Start: 2020-08-24 | End: 2021-01-13

## 2020-08-24 RX ORDER — ISOSORBIDE MONONITRATE 60 MG/1
60 TABLET, EXTENDED RELEASE ORAL
COMMUNITY
Start: 2020-08-07 | End: 2021-08-26

## 2020-08-24 NOTE — ASSESSMENT & PLAN NOTE
Could have some component of xanax withdraw  Go back to 0 25 xanax at bedtime, then stop after a week  Could use melatonin  Counseling on sleep hygiene provided

## 2020-08-24 NOTE — ASSESSMENT & PLAN NOTE
No sign of infection  No record of tetanus shot in chart  Since it was cut by metal, will give Tdap today  Neosporin cream to prevent infection

## 2020-08-24 NOTE — PROGRESS NOTES
Assessment/Plan:    Laceration of right lower leg  No sign of infection  No record of tetanus shot in chart  Since it was cut by metal, will give Tdap today  Neosporin cream to prevent infection  Insomnia  Could have some component of xanax withdraw  Go back to 0 25 xanax at bedtime, then stop after a week  Could use melatonin  Counseling on sleep hygiene provided  Diagnoses and all orders for this visit:    Laceration of right lower leg, initial encounter  -     bacitracin-polymyxin b (POLYSPORIN) ointment; Apply topically 2 (two) times a day    Insomnia, unspecified type    Other orders  -     Cancel: POCT hemoglobin A1c  -     Cancel: Ambulatory referral to Gastroenterology; Future  -     Cancel: Ambulatory referral to Ophthalmology; Future  -     isosorbide mononitrate (IMDUR) 60 mg 24 hr tablet; Take 60 mg by mouth daily at bedtime          Subjective:      Patient ID: Yudi Robles is a 69 y o  female  HPI    Insomnia:  Used to take xanax 0,5mg bid  Recently she stopped it because she was told it's not good in old people  Since then she has trouble falling asleep  Leg laceration:  Last Thursday had a cut on metal bed frame  The cut was small  Has some bleeding now it has stopped  Mild pain, has a scab on it  The following portions of the patient's history were reviewed and updated as appropriate: allergies, past family history, past social history and past surgical history  Review of Systems   Constitutional: Negative for chills, fatigue and fever  HENT: Negative for congestion, nosebleeds, postnasal drip, sneezing, sore throat and trouble swallowing  Eyes: Negative for pain  Respiratory: Negative for cough, chest tightness, shortness of breath and wheezing  Cardiovascular: Negative for chest pain, palpitations and leg swelling  Gastrointestinal: Negative for abdominal pain, constipation, diarrhea, nausea and vomiting     Endocrine: Negative for cold intolerance, heat intolerance, polydipsia and polyuria  Genitourinary: Negative for difficulty urinating, dysuria, flank pain and hematuria  Musculoskeletal: Negative for arthralgias and myalgias  Skin: Negative for rash  Skin cut   Neurological: Negative for dizziness, tremors, weakness and headaches  Hematological: Does not bruise/bleed easily  Psychiatric/Behavioral: Negative for confusion and dysphoric mood  The patient is not nervous/anxious  Insomnia         Objective:      /58   Pulse 81   Temp 97 8 °F (36 6 °C) (Tympanic)   Resp 16   Ht 5' 1" (1 549 m)   Wt 88 5 kg (195 lb 3 2 oz)   SpO2 94%   BMI 36 88 kg/m²          Physical Exam  Constitutional:       General: She is not in acute distress  Appearance: She is well-developed  HENT:      Head: Normocephalic and atraumatic  Right Ear: External ear normal       Left Ear: External ear normal    Eyes:      General: No scleral icterus  Conjunctiva/sclera: Conjunctivae normal    Neck:      Musculoskeletal: Normal range of motion and neck supple  Thyroid: No thyromegaly  Trachea: No tracheal deviation  Cardiovascular:      Rate and Rhythm: Normal rate and regular rhythm  Heart sounds: Normal heart sounds  Pulmonary:      Effort: Pulmonary effort is normal  No respiratory distress  Breath sounds: Normal breath sounds  No wheezing or rales  Abdominal:      General: Bowel sounds are normal       Palpations: Abdomen is soft  Tenderness: There is no abdominal tenderness  There is no guarding or rebound  Lymphadenopathy:      Cervical: No cervical adenopathy  Skin:         Neurological:      Mental Status: She is alert and oriented to person, place, and time  Psychiatric:         Behavior: Behavior normal          Thought Content:  Thought content normal          Judgment: Judgment normal

## 2020-08-24 NOTE — PATIENT INSTRUCTIONS
Healthy Sleep Tips  From [facility] [address]  [employer]  org      Healthy sleep habits can make a big difference in your quality of life  Having healthy sleep habits is often referred to as having good sleep hygiene  Try to keep the following sleep practices on a consistent basis:  1  Stick to a sleep schedule of the same bedtime and wake up time, even on the weekends  This helps to regulate your body's clock and could help you fall asleep and stay asleep for the night  2  Practice a relaxing bedtime ritual  A relaxing, routine activity right before bedtime conducted away from bright lights helps separate your sleep time from activities that can cause excitement, stress or anxiety which can make it more difficult to fall asleep, get sound and deep sleep or remain asleep  3  If you have trouble sleeping, avoid naps, especially in the afternoon  Power napping may help you get through the day, but if you find that you can't fall asleep at bedtime, eliminating even short catnaps may help  4  Exercise daily  Vigorous exercise is best, but even light exercise is better than no activity  Exercise at any time of day, but not at the expense of your sleep  5  Evaluate your room  Design your sleep environment to establish the conditions you need for sleep  Your bedroom should be cool - between 60 and 67 degrees  Your bedroom should also be free from any noise that can disturb your sleep  Finally, your bedroom should be free from any light  Check your room for noises or other distractions  This includes a bed partner's sleep disruptions such as snoring  Consider using blackout curtains, eye shades, ear plugs, "white noise" machines, humidifiers, fans and other devices  6  Sleep on a comfortable mattress and pillows  Make sure your mattress is comfortable and supportive  The one you have been using for years may have exceeded its life expectancy - about 9 or 10 years for most good quality mattresses  Have comfortable pillows and make the room attractive and inviting for sleep but also free of  allergens that might affect you and objects that might cause you to slip or fall if you have to get up during the night  7  Use bright light to help manage your circadian rhythms  Avoid bright light in the evening and expose yourself to sunlight in the morning  This will keep your circadian rhythms in check  8  Avoid alcohol, cigarettes, and heavy meals in the evening  Alcohol, cigarettes and caffeine can disrupt sleep  Eating big or spicy meals can cause discomfort from indigestion that can make it hard to sleep  If you can, avoid eating large meals for two to three hours before bedtime  Try a light snack 45 minutes before bed if youre still hungry  9  Wind down  Your body needs time to shift into sleep mode, so spend the last hour before bed doing a calming activity such as reading  For some people, using an electronic device such as a laptop can make it hard to fall asleep, because the particular type of light emanating from the screens of these devices is activating to the brain  If you have trouble sleeping,  avoid electronics before bed or in the middle of the night  10  If you can't sleep, go into another room and do something relaxing until you feel tired  It is best to take work materials, computers and televisions out of the sleeping environment  Use your bed only for sleep and sex to strengthen the association between bed and sleep  If you associate a particular activity or item with anxiety about sleeping, omit it from your bedtime routine  11  If youre still having trouble sleeping, dont hesitate to speak with your doctor or to find a sleep professional  Jacky Liriano may also benefit from recording your sleep in a Sleep Diary to help you better evaluate common patterns or issues you may see with your sleep or sleeping habits  What is sleep hygiene?   Sleep hygiene is a variety of different practices and habits that are necessary to have good nighttime sleep quality and full daytime alertness  Why is it important to practice good sleep hygiene? Obtaining healthy sleep is important for both physical and mental health  It can also improve productivity and overall quality of life  Everyone, from children to older adults, can benefit from practicing good sleep habits  How can I improve my sleep hygiene? One of the most important sleep hygiene practices is to spend an appropriate amount of time asleep in bed, not too little or too excessive  Sleep needs vary across ages and are especially impacted by lifestyle and health  However, there are recommendations that can provide guidance on how much sleep you need generally  Other good sleep hygiene practices include:  · Limiting daytime naps to 30 minutes  Napping does not make up for inadequate nighttime sleep  However, a short nap of 20-30 minutes can help to improve mood, alertness and performance  · Avoiding stimulants such as caffeine and nicotine close to bedtime  And when it comes to alcohol, moderation is key4  While alcohol is well-known to help you fall asleep faster, too much close to bedtime can disrupt sleep in the second half of the night as the body begins to process the alcohol  · Exercising to promote good quality sleep  As little as 10 minutes of aerobic exercise, such as walking or cycling, can drastically improve nighttime sleep quality  For the best nights sleep, most people should avoid strenuous workouts close to bedtime  However, the effect of intense nighttime exercise on sleep differs from person to person, so find out what works best for you  · Steering clear of food that can be disruptive right before sleep  Heavy or rich foods, fatty or fried meals, spicy dishes, citrus fruits, and carbonated drinks can trigger indigestion for some people   When this occurs close to bedtime, it can lead to painful heartburn that disrupts sleep   · Ensuring adequate exposure to natural light  This is particularly important for individuals who may not venture outside frequently  Exposure to sunlight during the day, as well as darkness at night, helps to maintain a healthy sleep-wake cycle  · Establishing a regular relaxing bedtime routine  A regular nightly routine helps the body recognize that it is bedtime  This could include taking warm shower or bath, reading a book, or light stretches  When possible, try to avoid emotionally upsetting conversations and activities before attempting to sleep  · Making sure that the sleep environment is pleasant  Mattress and pillows should be comfortable  The bedroom should be cool - between 60 and 67 degrees - for optimal sleep  Bright light from lamps, cell phone and TV screens can make it difficult to fall asleep4, so turn those light off or adjust them when possible  Consider using blackout curtains, eye shades, ear plugs, "white noise" machines, humidifiers, fans and other devices that can make the bedroom more relaxing  What are signs of poor sleep hygiene? Frequent sleep disturbances and daytime sleepiness are the most telling signs of poor sleep hygiene  In addition, if you're taking too long to fall asleep, you should consider evaluating your sleep routine and revising your bedtime habits  Just a few simple changes can make the difference between a good nights sleep and night spent tossing and turning

## 2020-08-31 ENCOUNTER — OFFICE VISIT (OUTPATIENT)
Dept: PSYCHIATRY | Facility: CLINIC | Age: 75
End: 2020-08-31
Payer: COMMERCIAL

## 2020-08-31 DIAGNOSIS — F41.0 PANIC ATTACKS: ICD-10-CM

## 2020-08-31 DIAGNOSIS — F33.2 MDD (MAJOR DEPRESSIVE DISORDER), RECURRENT SEVERE, WITHOUT PSYCHOSIS (HCC): Primary | ICD-10-CM

## 2020-08-31 DIAGNOSIS — F41.1 GAD (GENERALIZED ANXIETY DISORDER): ICD-10-CM

## 2020-08-31 DIAGNOSIS — F41.9 ANXIETY: ICD-10-CM

## 2020-08-31 PROCEDURE — 99214 OFFICE O/P EST MOD 30 MIN: CPT | Performed by: PSYCHIATRY & NEUROLOGY

## 2020-08-31 RX ORDER — DULOXETIN HYDROCHLORIDE 60 MG/1
60 CAPSULE, DELAYED RELEASE ORAL DAILY
Qty: 30 CAPSULE | Refills: 2 | Status: SHIPPED | OUTPATIENT
Start: 2020-08-31 | End: 2020-11-09

## 2020-08-31 RX ORDER — BUSPIRONE HYDROCHLORIDE 10 MG/1
TABLET ORAL
Qty: 60 TABLET | Refills: 2 | Status: SHIPPED | OUTPATIENT
Start: 2020-08-31 | End: 2020-11-09 | Stop reason: SDUPTHER

## 2020-08-31 NOTE — BH TREATMENT PLAN
TREATMENT PLAN (Medication Management Only)        [facility]    Name/Date of Birth/MRN:  Gunner Rodriguez 69 y o  1945 MRN: [id number]  Date of Treatment Plan: August 31, 2020  Diagnosis/Diagnoses:   1  Anxiety      Strengths/Personal Resources for Self-Care: , determined, caring  Area/Areas of need (in own words): family struggles, finances  1  Long Term Goal: "to not be the person everyone goes to"   Target Date: 180 days from treatment plan  Person/Persons responsible for completion of goal: Dr Jayashree Doyle and Self  2  Short Term Objective (s) - How will we reach this goal?:   A  Provider new recommended medication/dosage changes and/or continue medication(s): as noted in chart  B   cotninue regular therapy  C  Take meds and follow up a as recommended  Target Date: 6 months from treatment plan unless noted otherwise  Person/Persons Responsible for Completion of Goal: Dr Jayashree Doyle and Self   Progress Towards Goals: starting treatment   Treatment Modality: Medication management and therapy PRN  Review due 180 days from date of this plan: Approximately 6 months from today ( 2/28/2021 )    Expected length of service: ongoing treatment unless revised  My Physician/PA/NP and I have developed this plan together and I agree to work on the goals and objectives  I understand the treatment goals that were developed for my treatment    Signature:       Date and time:  Signature of parent/guardian if under age of 15 years: Date and time:  Signature of provider:      Date and time:  Signature of Supervising Physician:    Date and time: 8/31/2020      Dasha Rosenberg III, DO

## 2020-08-31 NOTE — PSYCH
MEDICATION MANAGEMENT NOTE        [facility]      Name and Date of Birth: Yudi Robles 69 y o  1945    Date of Visit: August 31, 2020    SUBJECTIVE:  CC: Jaron Molina presents today for follow up on depression and anxiety , " I am not doingwell, my mother passed"  Jaron Molina notes she was not able to see her mom, COVID and she could not figure out how to put the phone on the cradle, so batter would die often  She tried to reach her  She was upset at pt about being there 9dementia, falls), so strained relationship, and then pased in [place], so hard on patient  Dealing with guilt and loss  "I have beeen saying good bye to my mother for a long im", so this in some ways was a blessing for her mom, "But I keep waiting for her to walk in the room", and is still a missing biece of her life  Things ok at home, but  very depressed with COVID  She too stll fels the effects of it, not connected with grandkids the way she was  Isolation ongoing   is 'a burden' due to his depression of late, and daugher is anxious/obsessed with COVID (she is in medical profession) and gets tested often, always negative  Pain 7-8 without cymbalta, no pain now  Considering face to face PHP, but not a safety/acute issue  Tried xanax not in HS, but found she had bad dreams after a few nights of stopping that  PCP suggested she could try 1/2 pill  Discussed today, and she will try that  She wants to try buspar again (she stopped xanax at same time when trialed in past)    Since our last visit, overall symptoms have been gradually worsening        Med Compliance: yes    HPI ROS:             ('was' notes: recent => remote)  Medication Side Effects: Notes mom always darinel     Depression (10 worst): 6-7 (Was "I have better days than worse days")   Anxiety (10 worst): 8 (Was 8-9)   Safety concerns (SI, HI, etc):  very rare deathwish, better (Was passive deathwish, daughter protective, no plan or intent)   Sleep: (NM = Nightmares) hypersomnia (Was a bit better, less oversleeping)   Energy: low (Was low)   Appetite: Too good (Was ok)   Weight Change: Slight loss      Erin Coreas denies any side effects from medications unless noted above    Review Of Systems as noted above  Otherwise A comprehensive review of systems was negative  History Review:  The following portions of the patient's history were reviewed and documented: allergies, current medications, past family history, past medical history, past social history and problem list      Lab Review: No new labs or no relevant labs needing review with patient today      OBJECTIVE:     MENTAL STATUS EXAM  Appearance:  age appropriate   Behavior:  pleasant, cooperative, with good eye contact   Speech:  Normal volume, regular rate and rhythm   Mood:  depressed and anxious   Affect:  mood congruent   Language: intact and appropriate for age, education, and intellect   Thought Process:  Linear and goal directed   Associations: intact associations   Thought Content:  normal and appropriate, negative thinking and cognitive distortions   Perceptual Disturbances: no auditory or visual hallcunations   Risk Potential / Abnormal Thoughts: Suicidal ideation - Yes, deathwish but would not hasten death or pursue suicidal behavior, Would seek help, identifies reason to live, has means and plan to keep self safe  Homicidal ideation - None  Potential for aggression - No       Consciousness:  Alert & Awake   Sensorium:  Grossly oriented, Fully oriented to person, place, time/date   Attention: attention span and concentration are age appropriate       Fund of Knowledge:  Memory: awareness of current events: yes  recent and remote memory grossly intact   Insight:  good   Judgment: good   Muscle Strength Muscle Tone: normal  normal   Gait/Station: normal gait/station with good balance   Motor Activity: no abnormal movements       Risks, Benefits And Possible Side Effects Of Medications:    AGREE: Risks, benefits, and possible side effects of medications explained to Angela Johnston and she (or legal representative) verbalizes understanding and agreement for treatment  Controlled Medication Discussion:     Patient using medication appropriately  ______________________________________________________________        Recent labs:  No visits with results within 1 Month(s) from this visit  Latest known visit with results is:   Orders Only on 05/27/2020   Component Date Value    Color UA 05/27/2020 YELLOW     Urine Appearance 05/27/2020 CLEAR     Specific Gravity 05/27/2020 1 017     Ph 05/27/2020 < OR = 5 0     Glucose, Urine 05/27/2020 NEGATIVE     Bilirubin, Urine 05/27/2020 NEGATIVE     Ketone, Urine 05/27/2020 NEGATIVE     Blood, Urine 05/27/2020 NEGATIVE     Protein, Urine 05/27/2020 1+*    SL AMB NITRITES URINE, Q* 05/27/2020 NEGATIVE     Leukocyte Esterase 05/27/2020 NEGATIVE     SL AMB WBC, URINE 05/27/2020 0-5     RBC, Urine 05/27/2020 NONE SEEN     Squamous Epithelial Cells 05/27/2020 0-5     Bacteria, UA 05/27/2020 NONE SEEN     Hyaline Casts 05/27/2020 0-1*    Granular Casts UA 05/27/2020 0-1*    Creatinine, Urine 05/27/2020 120     Protein/Creat Ratio 05/27/2020 850*    Protein/Creat Ratio 05/27/2020 0 850*    Total Protein, Urine 05/27/2020 102*         Psychiatric History  Previous diagnoses include depression, anxiety  Prior outpatient psychiatric treatment: no  Prior therapy: yes  Prior inpatient psychiatric treatment: no  Prior suicide attempts: no  Prior self harm: no  Prior violence or aggression: no     Social History:  The patient grew up in [facility]  Childhood was described as "happy"      During childhood, parents were  Together   Only child     Abuse/neglect: none     As far as the patient (or present family member) is aware, overall childhood development: Patient does ascribe to normal developmental milestones such as walking, talking, potty training and making childhood friends      Education level: some college   Current occupation: h/o    Retired     Denominational Affiliatio: Christian   experience: no  Legal history: no  Access to [employer]:  has a 22 rifle       Substance use and treatment:  Tobacco use: former  Caffeine Use: limited  ETOH use: no  Other substance use: no     Endorses previous experimentation with: no     Longest clean time: not applicable  History of Inpatient/Outpatient rehabilitation program: no        Traumatic History:      Abuse: none  Other Traumatic Events: none      Family Psychiatric History:      Psychiatric Illness:      Anxiety & Depression - mother, daughter  Substance Abuse:       no family history of substance abuse  Suicide Attempts:        no family history of suicide attempts     Denies bipolar disorder       Family History   Problem Relation Age of Onset    Arthritis Mother     Leukemia Mother     Other Mother         Anxiety, major depressive disorder, recurrent episode with atypical features    Coronary artery disease Father         Heart problem    Diabetes Father     Other Father         Infectious disease    Alzheimer's disease Maternal Grandmother     Other Maternal Grandfather         Heart problem    Other Daughter         Anxiety, major depressive disorder, recurrent episode with atypical features    Alcohol abuse Other         Grandparent    Cancer Family     Diabetes Family     Hypertension Family     Other Family         Reported prior back trouble, thyroid disorder         Medical / Surgical History:    Past Medical History:   Diagnosis Date    Acute embolism and thrombosis of unspecified deep veins of unspecified lower extremity (HCC)     Last Assessed:  5/18/17    Anemia     Anosmia     Anxiety     Arthritis     Asthma     Back pain     Bilateral macular retinal edema     CAD (coronary artery disease)     Cataract     Cervical disc herniation     Cervical radiculopathy     Cervical spinal stenosis     Cervical spondylolysis     Chronic mastoiditis     Complex endometrial hyperplasia     Depression     Diabetes mellitus (HCC)     DVT (deep venous thrombosis) (HCC)     Fibromyalgia     Hyperlipidemia     Hypertension     Hypothyroid     Lumbar radiculopathy     Obese     Spinal stenosis     Stomach ulcer     Thyroid disease      Past Surgical History:   Procedure Laterality Date    BACK SURGERY      CARPAL TUNNEL RELEASE      CATARACT EXTRACTION      CHOLECYSTECTOMY      COLONOSCOPY      CORONARY ANGIOPLASTY      CORONARY ARTERY BYPASS GRAFT      CYSTOSCOPY N/A 6/20/2017    Procedure: Perri Hush;  Surgeon: Nia Quezada MD;  [facility]: [facility];  Service: Gynecology Oncology    NJ LAPAROSCOPY W TOT HYSTERECTUTERUS <=250 GRAM  W TUBE/OVARY N/A 6/20/2017    Procedure: ROBOTIC HYSTERECTOMY; BILATERAL SALPINO-OOPHERECTOMY; umbilical hernia repair ;  Surgeon: Nia Quezada MD;  [facility];  Service: Gynecology Oncology    TONSILECTOMY AND ADNOIDECTOMY      UMBILICAL HERNIA REPAIR         Assessment/Plan:        Diagnoses and all orders for this visit:    MDD (major depressive disorder), recurrent severe, without psychosis ([facility] )  -     busPIRone (BUSPAR) 10 mg tablet; Take 5mg BID  After 1 week increase to 5mg in AM and 10mg HS  After 1 more week, increase to 10mg BID  Anxiety  -     DULoxetine (CYMBALTA) 60 mg delayed release capsule; Take 1 capsule (60 mg total) by mouth daily    MADAN (generalized anxiety disorder)  -     busPIRone (BUSPAR) 10 mg tablet; Take 5mg BID  After 1 week increase to 5mg in AM and 10mg HS  After 1 more week, increase to 10mg BID  Panic attacks  -     busPIRone (BUSPAR) 10 mg tablet; Take 5mg BID  After 1 week increase to 5mg in AM and 10mg HS   After 1 more week, increase to 10mg BID         ______________________________________________________________________  MDD  MADAN  Panic Attacks (R/O Disorder)     MDD- not at goal  MADAN- not at goal  Panic Attacks - xanax manages adequately, less an issue than other diagnoses    Mario Shannon worse, wants to try buspar  Discussed [facility], she was not interested (revisit PRN)    In future, consider face to face PHP  From a biological perspective, she has SARAY (cannot tolerate CPAP or treatment), low GFR, and other medical issues  She does not drink     Suicide / Homicide / Safety risk assessment: see above  safety risk low overall upon consideration of protective and risk factors  Additional Assessment:    Previous psychotropic medication trials:   cymbalta since ~2018  Tried 90mg but GI upset too much  buspar - 3 days, but stopped xanax at same time  Crying  Likely not from medication  Zoloft- 100mg for a few years, no side effects, not sure if helped  Valium - helped  lexapro (ordered, but did not really try because fibromyalgia worse when stopping cymbalta)     Scales:         Treatment Plan:        Patient has been educated about their diagnosis and treatment modalities  They voiced understanding and agreement with the following plan:    1) MEDS:         Discussed medications and if treatment adjustment was needed/desired  - Xanax 0 5mg BID PRN (Dr Eh Nava prescribed last)   - Cymbalta 60mg daily   - START Buspar 5mg BID  Increase by 5mg total every week until 10mg BID  PARQ completed including serotonin syndrome, movement disorder, dizziness, sedation, GI distress, confusion, possible mood changes, xerostomia and visual disturbances       2) Labs: PRN     3) Therapy:    - Christopher Jurado (she will consider transfer if he cannot see her at frequency or duration she requires)  - Wants to do PHP once face to face, not virtual    4) Medical:    - Pt will f/u with other providers as needed     5) Other: Support as needed   - good relationship with    - mom was living with her, now in facility  challenging   - daughter struggling, has 3 kids total   Marital status:   Children: 3  Current Living Situation: the patient currently lives with   Social support: good    6) Follow up:   - Follow up in 2 months    - Patient will call if issues or concerns      7) Treatment Plan:    - Enacted 4/23/2020, 8/31/2020   Treatment Plan done but not signed at time of office visit due to:  Plan reviewed by phone or in person  and verbal consent given due to [address] distancing      Discussed self monitoring of symptoms, and symptom monitoring tools  Patient has been informed of 24 hours and weekend coverage for urgent situations accessed by calling the main clinic phone number  Psychotherapy in session:  Time spent performing psychotherapy: 17 minutes supportive therapy related to , daughter, [place], death of mother

## 2020-09-02 ENCOUNTER — TELEPHONE (OUTPATIENT)
Dept: NEPHROLOGY | Facility: CLINIC | Age: 75
End: 2020-09-02

## 2020-09-02 NOTE — TELEPHONE ENCOUNTER
A message was left requesting a call back to the office to schedule an October follow up with the provider or an AP 
2Gm/1Gm

## 2020-09-10 DIAGNOSIS — F41.9 ANXIETY: ICD-10-CM

## 2020-09-10 RX ORDER — ALPRAZOLAM 0.5 MG/1
0.5 TABLET ORAL 2 TIMES DAILY PRN
Qty: 60 TABLET | Refills: 1 | Status: SHIPPED | OUTPATIENT
Start: 2020-09-10 | End: 2020-11-05 | Stop reason: SDUPTHER

## 2020-09-17 ENCOUNTER — IMMUNIZATIONS (OUTPATIENT)
Dept: INTERNAL MEDICINE CLINIC | Facility: CLINIC | Age: 75
End: 2020-09-17
Payer: COMMERCIAL

## 2020-09-17 DIAGNOSIS — Z23 ENCOUNTER FOR IMMUNIZATION: ICD-10-CM

## 2020-09-17 PROCEDURE — G0008 ADMIN INFLUENZA VIRUS VAC: HCPCS

## 2020-09-17 PROCEDURE — 90662 IIV NO PRSV INCREASED AG IM: CPT

## 2020-09-23 LAB
LEFT EYE DIABETIC RETINOPATHY: NORMAL
RIGHT EYE DIABETIC RETINOPATHY: NORMAL

## 2020-10-16 ENCOUNTER — TELEPHONE (OUTPATIENT)
Dept: OBGYN CLINIC | Facility: CLINIC | Age: 75
End: 2020-10-16

## 2020-10-30 ENCOUNTER — OFFICE VISIT (OUTPATIENT)
Dept: OBGYN CLINIC | Facility: CLINIC | Age: 75
End: 2020-10-30
Payer: COMMERCIAL

## 2020-10-30 VITALS — HEIGHT: 61 IN | WEIGHT: 195 LBS | BODY MASS INDEX: 36.82 KG/M2

## 2020-10-30 DIAGNOSIS — M17.11 ARTHRITIS OF RIGHT KNEE: Primary | ICD-10-CM

## 2020-10-30 PROCEDURE — 20610 DRAIN/INJ JOINT/BURSA W/O US: CPT | Performed by: ORTHOPAEDIC SURGERY

## 2020-10-30 RX ORDER — HYALURONATE SODIUM 10 MG/ML
20 SYRINGE (ML) INTRAARTICULAR
Status: COMPLETED | OUTPATIENT
Start: 2020-10-30 | End: 2020-10-30

## 2020-10-30 RX ORDER — DICLOFENAC SODIUM 75 MG/1
TABLET, DELAYED RELEASE ORAL
COMMUNITY
End: 2021-01-13

## 2020-10-30 RX ADMIN — Medication 20 MG: at 11:05

## 2020-11-05 DIAGNOSIS — F41.9 ANXIETY: ICD-10-CM

## 2020-11-05 RX ORDER — ALPRAZOLAM 0.5 MG/1
0.5 TABLET ORAL 2 TIMES DAILY PRN
Qty: 60 TABLET | Refills: 1 | Status: SHIPPED | OUTPATIENT
Start: 2020-11-05 | End: 2021-01-11 | Stop reason: SDUPTHER

## 2020-11-06 ENCOUNTER — OFFICE VISIT (OUTPATIENT)
Dept: OBGYN CLINIC | Facility: CLINIC | Age: 75
End: 2020-11-06
Payer: COMMERCIAL

## 2020-11-06 VITALS
SYSTOLIC BLOOD PRESSURE: 136 MMHG | DIASTOLIC BLOOD PRESSURE: 72 MMHG | HEIGHT: 61 IN | BODY MASS INDEX: 36.82 KG/M2 | WEIGHT: 195 LBS | HEART RATE: 80 BPM

## 2020-11-06 DIAGNOSIS — M17.11 ARTHRITIS OF RIGHT KNEE: Primary | ICD-10-CM

## 2020-11-06 PROCEDURE — 20610 DRAIN/INJ JOINT/BURSA W/O US: CPT | Performed by: PHYSICIAN ASSISTANT

## 2020-11-06 RX ORDER — HYALURONATE SODIUM 10 MG/ML
20 SYRINGE (ML) INTRAARTICULAR
Status: COMPLETED | OUTPATIENT
Start: 2020-11-06 | End: 2020-11-06

## 2020-11-06 RX ADMIN — Medication 20 MG: at 10:58

## 2020-11-09 ENCOUNTER — OFFICE VISIT (OUTPATIENT)
Dept: PSYCHIATRY | Facility: CLINIC | Age: 75
End: 2020-11-09
Payer: COMMERCIAL

## 2020-11-09 ENCOUNTER — TELEPHONE (OUTPATIENT)
Dept: PSYCHOLOGY | Facility: CLINIC | Age: 75
End: 2020-11-09

## 2020-11-09 ENCOUNTER — VBI (OUTPATIENT)
Dept: ADMINISTRATIVE | Facility: OTHER | Age: 75
End: 2020-11-09

## 2020-11-09 DIAGNOSIS — F41.1 GAD (GENERALIZED ANXIETY DISORDER): ICD-10-CM

## 2020-11-09 DIAGNOSIS — F41.0 PANIC ATTACKS: ICD-10-CM

## 2020-11-09 DIAGNOSIS — F33.2 MDD (MAJOR DEPRESSIVE DISORDER), RECURRENT SEVERE, WITHOUT PSYCHOSIS (HCC): Primary | ICD-10-CM

## 2020-11-09 PROCEDURE — 90833 PSYTX W PT W E/M 30 MIN: CPT | Performed by: PSYCHIATRY & NEUROLOGY

## 2020-11-09 PROCEDURE — 99214 OFFICE O/P EST MOD 30 MIN: CPT | Performed by: PSYCHIATRY & NEUROLOGY

## 2020-11-09 RX ORDER — ESCITALOPRAM OXALATE 10 MG/1
TABLET ORAL
Qty: 30 TABLET | Refills: 2 | Status: SHIPPED | OUTPATIENT
Start: 2020-11-09 | End: 2020-12-04 | Stop reason: ALTCHOICE

## 2020-11-09 RX ORDER — BUSPIRONE HYDROCHLORIDE 10 MG/1
10 TABLET ORAL 2 TIMES DAILY
Qty: 60 TABLET | Refills: 2 | Status: SHIPPED | OUTPATIENT
Start: 2020-11-09 | End: 2021-01-13

## 2020-11-11 ENCOUNTER — TRANSCRIBE ORDERS (OUTPATIENT)
Dept: LAB | Facility: CLINIC | Age: 75
End: 2020-11-11

## 2020-11-11 ENCOUNTER — LAB (OUTPATIENT)
Dept: LAB | Facility: CLINIC | Age: 75
End: 2020-11-11
Payer: COMMERCIAL

## 2020-11-11 ENCOUNTER — TELEPHONE (OUTPATIENT)
Dept: NEPHROLOGY | Facility: CLINIC | Age: 75
End: 2020-11-11

## 2020-11-11 DIAGNOSIS — I10 ESSENTIAL HYPERTENSION, MALIGNANT: ICD-10-CM

## 2020-11-11 DIAGNOSIS — E55.9 AVITAMINOSIS D: ICD-10-CM

## 2020-11-11 DIAGNOSIS — E55.9 AVITAMINOSIS D: Primary | ICD-10-CM

## 2020-11-11 DIAGNOSIS — N18.30 STAGE 3 CHRONIC KIDNEY DISEASE, UNSPECIFIED WHETHER STAGE 3A OR 3B CKD (HCC): ICD-10-CM

## 2020-11-11 LAB
ALBUMIN SERPL BCP-MCNC: 3.8 G/DL (ref 3.5–5)
ANION GAP SERPL CALCULATED.3IONS-SCNC: 12 MMOL/L (ref 4–13)
BUN SERPL-MCNC: 21 MG/DL (ref 5–25)
CALCIUM SERPL-MCNC: 10.1 MG/DL (ref 8.3–10.1)
CHLORIDE SERPL-SCNC: 105 MMOL/L (ref 100–108)
CO2 SERPL-SCNC: 25 MMOL/L (ref 21–32)
CREAT SERPL-MCNC: 1.64 MG/DL (ref 0.6–1.3)
CREAT UR-MCNC: 131 MG/DL
ERYTHROCYTE [DISTWIDTH] IN BLOOD BY AUTOMATED COUNT: 16.1 % (ref 11.6–15.1)
GFR SERPL CREATININE-BSD FRML MDRD: 31 ML/MIN/1.73SQ M
GLUCOSE SERPL-MCNC: 256 MG/DL (ref 65–140)
HCT VFR BLD AUTO: 39.4 % (ref 34.8–46.1)
HGB BLD-MCNC: 12.1 G/DL (ref 11.5–15.4)
MAGNESIUM SERPL-MCNC: 2 MG/DL (ref 1.6–2.6)
MCH RBC QN AUTO: 27.3 PG (ref 26.8–34.3)
MCHC RBC AUTO-ENTMCNC: 30.7 G/DL (ref 31.4–37.4)
MCV RBC AUTO: 89 FL (ref 82–98)
PHOSPHATE SERPL-MCNC: 3.4 MG/DL (ref 2.3–4.1)
PLATELET # BLD AUTO: 260 THOUSANDS/UL (ref 149–390)
PMV BLD AUTO: 11.1 FL (ref 8.9–12.7)
POTASSIUM SERPL-SCNC: 4.2 MMOL/L (ref 3.5–5.3)
PROT UR-MCNC: 104 MG/DL
PROT/CREAT UR: 0.79 MG/G{CREAT} (ref 0–0.1)
RBC # BLD AUTO: 4.44 MILLION/UL (ref 3.81–5.12)
SODIUM SERPL-SCNC: 142 MMOL/L (ref 136–145)
WBC # BLD AUTO: 9.54 THOUSAND/UL (ref 4.31–10.16)

## 2020-11-11 PROCEDURE — 82570 ASSAY OF URINE CREATININE: CPT | Performed by: NURSE PRACTITIONER

## 2020-11-11 PROCEDURE — 36415 COLL VENOUS BLD VENIPUNCTURE: CPT

## 2020-11-11 PROCEDURE — 85027 COMPLETE CBC AUTOMATED: CPT

## 2020-11-11 PROCEDURE — 80069 RENAL FUNCTION PANEL: CPT

## 2020-11-11 PROCEDURE — 84156 ASSAY OF PROTEIN URINE: CPT | Performed by: NURSE PRACTITIONER

## 2020-11-11 PROCEDURE — 3061F NEG MICROALBUMINURIA REV: CPT | Performed by: NURSE PRACTITIONER

## 2020-11-11 PROCEDURE — 83735 ASSAY OF MAGNESIUM: CPT

## 2020-11-12 ENCOUNTER — VBI (OUTPATIENT)
Dept: ADMINISTRATIVE | Facility: OTHER | Age: 75
End: 2020-11-12

## 2020-11-12 ENCOUNTER — TELEPHONE (OUTPATIENT)
Dept: INTERNAL MEDICINE CLINIC | Facility: CLINIC | Age: 75
End: 2020-11-12

## 2020-11-12 ENCOUNTER — OFFICE VISIT (OUTPATIENT)
Dept: NEPHROLOGY | Facility: CLINIC | Age: 75
End: 2020-11-12
Payer: COMMERCIAL

## 2020-11-12 VITALS
DIASTOLIC BLOOD PRESSURE: 68 MMHG | SYSTOLIC BLOOD PRESSURE: 144 MMHG | HEIGHT: 61 IN | BODY MASS INDEX: 33.76 KG/M2 | HEART RATE: 80 BPM | TEMPERATURE: 96 F | WEIGHT: 178.8 LBS

## 2020-11-12 DIAGNOSIS — R80.8 OTHER PROTEINURIA: ICD-10-CM

## 2020-11-12 DIAGNOSIS — N18.9 CHRONIC KIDNEY DISEASE, UNSPECIFIED CKD STAGE: Primary | ICD-10-CM

## 2020-11-12 DIAGNOSIS — F41.9 ANXIETY: Primary | ICD-10-CM

## 2020-11-12 PROCEDURE — 99214 OFFICE O/P EST MOD 30 MIN: CPT | Performed by: INTERNAL MEDICINE

## 2020-11-12 PROCEDURE — 3066F NEPHROPATHY DOC TX: CPT | Performed by: NURSE PRACTITIONER

## 2020-11-12 RX ORDER — DULOXETIN HYDROCHLORIDE 30 MG/1
30 CAPSULE, DELAYED RELEASE ORAL DAILY
Qty: 30 CAPSULE | Refills: 5 | Status: SHIPPED | OUTPATIENT
Start: 2020-11-12 | End: 2021-01-13 | Stop reason: SDUPTHER

## 2020-11-13 ENCOUNTER — OFFICE VISIT (OUTPATIENT)
Dept: OBGYN CLINIC | Facility: CLINIC | Age: 75
End: 2020-11-13
Payer: COMMERCIAL

## 2020-11-13 VITALS
TEMPERATURE: 97 F | WEIGHT: 178 LBS | SYSTOLIC BLOOD PRESSURE: 170 MMHG | DIASTOLIC BLOOD PRESSURE: 83 MMHG | HEIGHT: 61 IN | HEART RATE: 82 BPM | BODY MASS INDEX: 33.61 KG/M2

## 2020-11-13 DIAGNOSIS — M17.11 PRIMARY OSTEOARTHRITIS OF RIGHT KNEE: Primary | ICD-10-CM

## 2020-11-13 PROCEDURE — 20610 DRAIN/INJ JOINT/BURSA W/O US: CPT | Performed by: ORTHOPAEDIC SURGERY

## 2020-11-13 RX ORDER — HYALURONATE SODIUM 10 MG/ML
20 SYRINGE (ML) INTRAARTICULAR
Status: COMPLETED | OUTPATIENT
Start: 2020-11-13 | End: 2020-11-13

## 2020-11-13 RX ADMIN — Medication 20 MG: at 10:45

## 2020-11-16 ENCOUNTER — OFFICE VISIT (OUTPATIENT)
Dept: PAIN MEDICINE | Facility: CLINIC | Age: 75
End: 2020-11-16
Payer: COMMERCIAL

## 2020-11-16 VITALS
TEMPERATURE: 97.9 F | WEIGHT: 201 LBS | BODY MASS INDEX: 37.95 KG/M2 | HEIGHT: 61 IN | RESPIRATION RATE: 18 BRPM | HEART RATE: 86 BPM | DIASTOLIC BLOOD PRESSURE: 60 MMHG | SYSTOLIC BLOOD PRESSURE: 149 MMHG

## 2020-11-16 DIAGNOSIS — M47.816 LUMBAR SPONDYLOSIS: ICD-10-CM

## 2020-11-16 DIAGNOSIS — G89.4 CHRONIC PAIN SYNDROME: Primary | ICD-10-CM

## 2020-11-16 DIAGNOSIS — M54.50 CHRONIC BILATERAL LOW BACK PAIN, UNSPECIFIED WHETHER SCIATICA PRESENT: ICD-10-CM

## 2020-11-16 DIAGNOSIS — M54.2 NECK PAIN: ICD-10-CM

## 2020-11-16 DIAGNOSIS — M79.18 MYOFASCIAL PAIN SYNDROME: ICD-10-CM

## 2020-11-16 DIAGNOSIS — G89.29 CHRONIC BILATERAL LOW BACK PAIN, UNSPECIFIED WHETHER SCIATICA PRESENT: ICD-10-CM

## 2020-11-16 DIAGNOSIS — M47.812 CERVICAL SPONDYLOSIS: ICD-10-CM

## 2020-11-16 PROCEDURE — 1160F RVW MEDS BY RX/DR IN RCRD: CPT | Performed by: NURSE PRACTITIONER

## 2020-11-16 PROCEDURE — 3008F BODY MASS INDEX DOCD: CPT | Performed by: NURSE PRACTITIONER

## 2020-11-16 PROCEDURE — 1036F TOBACCO NON-USER: CPT | Performed by: NURSE PRACTITIONER

## 2020-11-16 PROCEDURE — 99214 OFFICE O/P EST MOD 30 MIN: CPT | Performed by: NURSE PRACTITIONER

## 2020-11-16 PROCEDURE — 3078F DIAST BP <80 MM HG: CPT | Performed by: NURSE PRACTITIONER

## 2020-11-16 PROCEDURE — 3077F SYST BP >= 140 MM HG: CPT | Performed by: NURSE PRACTITIONER

## 2020-11-16 RX ORDER — METHOCARBAMOL 500 MG/1
TABLET, FILM COATED ORAL
Qty: 30 TABLET | Refills: 0 | Status: SHIPPED | OUTPATIENT
Start: 2020-11-16 | End: 2021-01-13

## 2020-11-20 ENCOUNTER — TRANSCRIBE ORDERS (OUTPATIENT)
Dept: PAIN MEDICINE | Facility: CLINIC | Age: 75
End: 2020-11-20

## 2020-12-02 ENCOUNTER — SOCIAL WORK (OUTPATIENT)
Dept: BEHAVIORAL/MENTAL HEALTH CLINIC | Facility: CLINIC | Age: 75
End: 2020-12-02
Payer: COMMERCIAL

## 2020-12-02 DIAGNOSIS — F33.2 MAJOR DEPRESSIVE DISORDER, RECURRENT SEVERE WITHOUT PSYCHOTIC FEATURES (HCC): Primary | ICD-10-CM

## 2020-12-02 PROCEDURE — 90834 PSYTX W PT 45 MINUTES: CPT | Performed by: SOCIAL WORKER

## 2020-12-04 ENCOUNTER — OFFICE VISIT (OUTPATIENT)
Dept: INTERNAL MEDICINE CLINIC | Facility: CLINIC | Age: 75
End: 2020-12-04
Payer: COMMERCIAL

## 2020-12-04 VITALS
RESPIRATION RATE: 16 BRPM | WEIGHT: 203.8 LBS | BODY MASS INDEX: 38.48 KG/M2 | DIASTOLIC BLOOD PRESSURE: 68 MMHG | OXYGEN SATURATION: 99 % | HEIGHT: 61 IN | HEART RATE: 89 BPM | SYSTOLIC BLOOD PRESSURE: 130 MMHG

## 2020-12-04 DIAGNOSIS — F41.9 ANXIETY: Primary | ICD-10-CM

## 2020-12-04 DIAGNOSIS — F33.2 MDD (MAJOR DEPRESSIVE DISORDER), RECURRENT SEVERE, WITHOUT PSYCHOSIS (HCC): ICD-10-CM

## 2020-12-04 DIAGNOSIS — Z01.419 ENCOUNTER FOR GYNECOLOGICAL EXAMINATION WITHOUT ABNORMAL FINDING: ICD-10-CM

## 2020-12-04 DIAGNOSIS — Z12.11 SCREENING FOR MALIGNANT NEOPLASM OF COLON: ICD-10-CM

## 2020-12-04 DIAGNOSIS — I10 ESSENTIAL HYPERTENSION: ICD-10-CM

## 2020-12-04 DIAGNOSIS — Z79.4 TYPE 2 DIABETES MELLITUS WITH COMPLICATION, WITH LONG-TERM CURRENT USE OF INSULIN (HCC): ICD-10-CM

## 2020-12-04 DIAGNOSIS — F32.A DEPRESSION, UNSPECIFIED DEPRESSION TYPE: ICD-10-CM

## 2020-12-04 DIAGNOSIS — E66.9 CLASS 2 OBESITY WITH BODY MASS INDEX (BMI) OF 38.0 TO 38.9 IN ADULT, UNSPECIFIED OBESITY TYPE, UNSPECIFIED WHETHER SERIOUS COMORBIDITY PRESENT: ICD-10-CM

## 2020-12-04 DIAGNOSIS — E11.8 TYPE 2 DIABETES MELLITUS WITH COMPLICATION, WITH LONG-TERM CURRENT USE OF INSULIN (HCC): ICD-10-CM

## 2020-12-04 DIAGNOSIS — E03.9 HYPOTHYROIDISM, UNSPECIFIED TYPE: ICD-10-CM

## 2020-12-04 DIAGNOSIS — E78.5 HYPERLIPIDEMIA, UNSPECIFIED HYPERLIPIDEMIA TYPE: Chronic | ICD-10-CM

## 2020-12-04 PROCEDURE — 3725F SCREEN DEPRESSION PERFORMED: CPT | Performed by: INTERNAL MEDICINE

## 2020-12-04 PROCEDURE — 3078F DIAST BP <80 MM HG: CPT | Performed by: INTERNAL MEDICINE

## 2020-12-04 PROCEDURE — 1036F TOBACCO NON-USER: CPT | Performed by: INTERNAL MEDICINE

## 2020-12-04 PROCEDURE — 99214 OFFICE O/P EST MOD 30 MIN: CPT | Performed by: INTERNAL MEDICINE

## 2020-12-04 PROCEDURE — 3008F BODY MASS INDEX DOCD: CPT | Performed by: INTERNAL MEDICINE

## 2020-12-04 PROCEDURE — 3075F SYST BP GE 130 - 139MM HG: CPT | Performed by: INTERNAL MEDICINE

## 2020-12-04 PROCEDURE — 1160F RVW MEDS BY RX/DR IN RCRD: CPT | Performed by: INTERNAL MEDICINE

## 2021-01-06 ENCOUNTER — SOCIAL WORK (OUTPATIENT)
Dept: BEHAVIORAL/MENTAL HEALTH CLINIC | Facility: CLINIC | Age: 76
End: 2021-01-06
Payer: COMMERCIAL

## 2021-01-06 DIAGNOSIS — F33.2 MAJOR DEPRESSIVE DISORDER, RECURRENT SEVERE WITHOUT PSYCHOTIC FEATURES (HCC): Primary | ICD-10-CM

## 2021-01-06 PROCEDURE — 90834 PSYTX W PT 45 MINUTES: CPT | Performed by: SOCIAL WORKER

## 2021-01-06 NOTE — PATIENT INSTRUCTIONS
Asmita Meraz utilizes session to ventilate about and process stressors- validation and supportive therapy provided  Reviewed coping strategies and outlets to utilize to better manage stress and mood issues  Reviewed boundaries to maintain with others to prevent exposure to additional stress

## 2021-01-06 NOTE — PSYCH
Assessment/Plan: Manage depression and anxiety     There are no diagnoses linked to this encounter  Subjective: Jenny Choe continues to have periods where she feels increasingly depressed and overwhelmed  Has been dealing with long-standing family and marital issues that have contributes along with ongoing physical health issues  Feels the stress related to the pandemic has made these struggles more difficult  Utilizes contact with grandchildren as well as some activity away from home with  to offset stress  However, due to Covid concerns, she has been more apprehensive about venturing away from house  Isolation this has created has not been helpful  Denies any SI  Patient ID: Pravin Bell is a 77 y o  female  Met with Jenny Choe for 60 minutes from 11:00AM-12:00PM  Continues to struggle with depression and anxiety  Managing best she can  Declines [employer] due to her preference to address issues in an individual setting  Feels daily stress related to pandemic has worsened her struggles  Reports discontinuing Buspar due to crying spells  Review of Systems   Psychiatric/Behavioral: Positive for dysphoric mood  The patient is nervous/anxious  Objective: Jenny Choe presents as depressed and anxious but managing best she can  She presents as verbal, cooperative, well oriented and engaged during session  Physical Exam  Psychiatric:         Attention and Perception: Attention and perception normal          Mood and Affect: Mood is anxious and depressed  Affect is flat  Speech: Speech normal          Behavior: Behavior normal  Behavior is cooperative  Thought Content:  Thought content normal          Cognition and Memory: Cognition and memory normal          Judgment: Judgment normal

## 2021-01-11 DIAGNOSIS — F41.9 ANXIETY: ICD-10-CM

## 2021-01-11 RX ORDER — ALPRAZOLAM 0.5 MG/1
0.5 TABLET ORAL 2 TIMES DAILY PRN
Qty: 60 TABLET | Refills: 1 | Status: SHIPPED | OUTPATIENT
Start: 2021-01-11 | End: 2021-03-12 | Stop reason: SDUPTHER

## 2021-01-12 ENCOUNTER — VBI (OUTPATIENT)
Dept: ADMINISTRATIVE | Facility: OTHER | Age: 76
End: 2021-01-12

## 2021-01-13 ENCOUNTER — OFFICE VISIT (OUTPATIENT)
Dept: PSYCHIATRY | Facility: CLINIC | Age: 76
End: 2021-01-13
Payer: COMMERCIAL

## 2021-01-13 DIAGNOSIS — F41.9 ANXIETY: ICD-10-CM

## 2021-01-13 DIAGNOSIS — F33.2 MDD (MAJOR DEPRESSIVE DISORDER), RECURRENT SEVERE, WITHOUT PSYCHOSIS (HCC): Primary | ICD-10-CM

## 2021-01-13 PROCEDURE — 99214 OFFICE O/P EST MOD 30 MIN: CPT | Performed by: PSYCHIATRY & NEUROLOGY

## 2021-01-13 PROCEDURE — 1160F RVW MEDS BY RX/DR IN RCRD: CPT | Performed by: PSYCHIATRY & NEUROLOGY

## 2021-01-13 RX ORDER — DULOXETIN HYDROCHLORIDE 60 MG/1
60 CAPSULE, DELAYED RELEASE ORAL DAILY
Qty: 30 CAPSULE | Refills: 2 | Status: SHIPPED | OUTPATIENT
Start: 2021-01-13 | End: 2021-03-30 | Stop reason: SDUPTHER

## 2021-01-13 RX ORDER — LAMOTRIGINE 25 MG/1
TABLET ORAL
Qty: 60 TABLET | Refills: 1 | Status: SHIPPED | OUTPATIENT
Start: 2021-01-13 | End: 2021-02-08
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
## 2021-01-13 NOTE — PSYCH
This note was not shared with the patient due to this is a psychotherapy note     Linda Alcantar is very indecisive, and self sabotaging inadvertently        5) Other: Support as needed   - ok relationship with , up and down   - mom's death in facility hard on her    - daughter struggling, has 3 kids total   - Children: 3

## 2021-01-13 NOTE — BH TREATMENT PLAN
TREATMENT PLAN (Medication Management Only)        [facility]    Name/Date of Birth/MRN:  Emmanuelle Lester 77 y o  1945 MRN: [id number]  Date of Treatment Plan: January 13, 2021  Diagnosis/Diagnoses:   1  MDD (major depressive disorder), recurrent severe, without psychosis ([facility] [address] )    2  Anxiety      Strengths/Personal Resources for Self-Care: , determined, caring  Area/Areas of need (in own words): family struggles, finances  1  Long Term Goal: "to not be the person everyone goes to"   Target Date: 180 days from treatment plan  Person/Persons responsible for completion of goal: Dr Saint [place] and Self  2  Short Term Objective (s) - How will we reach this goal?:   A  Provider new recommended medication/dosage changes and/or continue medication(s): as noted in chart  B   Continue to work with Osmin gA I in therapy, consider PHP  C  Take meds and follow up a as recommended  Target Date: 6 months from treatment plan unless noted otherwise  Person/Persons Responsible for Completion of Goal: Dr Saint Cairo and Self   Progress Towards Goals: starting treatment   Treatment Modality: Medication management and therapy PRN  Review due 180 days from date of this plan: Approximately 6 months from today ( 7/13/2021 )    Expected length of service: ongoing treatment unless revised  My Physician/PA/NP and I have developed this plan together and I agree to work on the goals and objectives  I understand the treatment goals that were developed for my treatment    Signature:       Date and time:  Signature of parent/guardian if under age of 15 years: Date and time:  Signature of provider:      Date and time:  Signature of Supervising Physician:    Date and time: 1/13/2021      Eliu Valdez III, DO

## 2021-02-08 DIAGNOSIS — F33.2 MDD (MAJOR DEPRESSIVE DISORDER), RECURRENT SEVERE, WITHOUT PSYCHOSIS (HCC): ICD-10-CM

## 2021-02-08 RX ORDER — LAMOTRIGINE 25 MG/1
TABLET ORAL
Qty: 60 TABLET | Refills: 1 | Status: SHIPPED | OUTPATIENT
Start: 2021-02-08 | End: 2021-02-26

## 2021-02-13 DIAGNOSIS — Z23 ENCOUNTER FOR IMMUNIZATION: ICD-10-CM

## 2021-02-24 ENCOUNTER — OFFICE VISIT (OUTPATIENT)
Dept: INTERNAL MEDICINE CLINIC | Facility: CLINIC | Age: 76
End: 2021-02-24
Payer: COMMERCIAL

## 2021-02-24 ENCOUNTER — SOCIAL WORK (OUTPATIENT)
Dept: BEHAVIORAL/MENTAL HEALTH CLINIC | Facility: CLINIC | Age: 76
End: 2021-02-24
Payer: COMMERCIAL

## 2021-02-24 VITALS
OXYGEN SATURATION: 97 % | SYSTOLIC BLOOD PRESSURE: 124 MMHG | WEIGHT: 204.2 LBS | HEART RATE: 72 BPM | DIASTOLIC BLOOD PRESSURE: 78 MMHG | HEIGHT: 61 IN | BODY MASS INDEX: 38.55 KG/M2 | RESPIRATION RATE: 16 BRPM

## 2021-02-24 DIAGNOSIS — F33.1 MAJOR DEPRESSIVE DISORDER, RECURRENT, MODERATE (HCC): Primary | ICD-10-CM

## 2021-02-24 DIAGNOSIS — F33.1 MODERATE EPISODE OF RECURRENT MAJOR DEPRESSIVE DISORDER (HCC): ICD-10-CM

## 2021-02-24 DIAGNOSIS — R91.8 PULMONARY NODULES: ICD-10-CM

## 2021-02-24 DIAGNOSIS — Z00.00 MEDICARE ANNUAL WELLNESS VISIT, SUBSEQUENT: Primary | ICD-10-CM

## 2021-02-24 DIAGNOSIS — Z11.59 NEED FOR HEPATITIS C SCREENING TEST: ICD-10-CM

## 2021-02-24 DIAGNOSIS — Z79.4 TYPE 2 DIABETES MELLITUS WITH COMPLICATION, WITH LONG-TERM CURRENT USE OF INSULIN (HCC): ICD-10-CM

## 2021-02-24 DIAGNOSIS — E66.9 CLASS 2 OBESITY WITH BODY MASS INDEX (BMI) OF 38.0 TO 38.9 IN ADULT, UNSPECIFIED OBESITY TYPE, UNSPECIFIED WHETHER SERIOUS COMORBIDITY PRESENT: ICD-10-CM

## 2021-02-24 DIAGNOSIS — Z23 ENCOUNTER FOR IMMUNIZATION: ICD-10-CM

## 2021-02-24 DIAGNOSIS — E11.8 TYPE 2 DIABETES MELLITUS WITH COMPLICATION, WITH LONG-TERM CURRENT USE OF INSULIN (HCC): ICD-10-CM

## 2021-02-24 DIAGNOSIS — R91.1 LUNG NODULE: ICD-10-CM

## 2021-02-24 PROBLEM — F33.9 RECURRENT MAJOR DEPRESSIVE DISORDER (HCC): Status: ACTIVE | Noted: 2021-02-24

## 2021-02-24 PROCEDURE — 99214 OFFICE O/P EST MOD 30 MIN: CPT | Performed by: INTERNAL MEDICINE

## 2021-02-24 PROCEDURE — 3725F SCREEN DEPRESSION PERFORMED: CPT | Performed by: INTERNAL MEDICINE

## 2021-02-24 PROCEDURE — G0439 PPPS, SUBSEQ VISIT: HCPCS | Performed by: INTERNAL MEDICINE

## 2021-02-24 PROCEDURE — 3008F BODY MASS INDEX DOCD: CPT | Performed by: PSYCHIATRY & NEUROLOGY

## 2021-02-24 PROCEDURE — 3288F FALL RISK ASSESSMENT DOCD: CPT | Performed by: INTERNAL MEDICINE

## 2021-02-24 PROCEDURE — 3078F DIAST BP <80 MM HG: CPT | Performed by: INTERNAL MEDICINE

## 2021-02-24 PROCEDURE — 1101F PT FALLS ASSESS-DOCD LE1/YR: CPT | Performed by: INTERNAL MEDICINE

## 2021-02-24 PROCEDURE — 90834 PSYTX W PT 45 MINUTES: CPT | Performed by: SOCIAL WORKER

## 2021-02-24 PROCEDURE — 1125F AMNT PAIN NOTED PAIN PRSNT: CPT | Performed by: INTERNAL MEDICINE

## 2021-02-24 PROCEDURE — 1170F FXNL STATUS ASSESSED: CPT | Performed by: INTERNAL MEDICINE
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
## 2021-02-24 NOTE — PATIENT INSTRUCTIONS
Medicare Preventive Visit Patient Instructions  Thank you for completing your Welcome to Medicare Visit or Medicare Annual Wellness Visit today  Your next wellness visit will be due in one year (2/24/2022)  The screening/preventive services that you may require over the next 5-10 years are detailed below  Some tests may not apply to you based off risk factors and/or age  Screening tests ordered at today's visit but not completed yet may show as past due  Also, please note that scanned in results may not display below  Preventive Screenings:  Service Recommendations Previous Testing/Comments   Colorectal Cancer Screening  * Colonoscopy    * Fecal Occult Blood Test (FOBT)/Fecal Immunochemical Test (FIT)  * Fecal DNA/Cologuard Test  * Flexible Sigmoidoscopy Age: 54-65 years old   Colonoscopy: every 10 years (may be performed more frequently if at higher risk)  OR  FOBT/FIT: every 1 year  OR  Cologuard: every 3 years  OR  Sigmoidoscopy: every 5 years  Screening may be recommended earlier than age 48 if at higher risk for colorectal cancer  Also, an individualized decision between you and your healthcare provider will decide whether screening between the ages of 74-80 would be appropriate  Colonoscopy: 05/30/2017  FOBT/FIT: Not on file  Cologuard: Not on file  Sigmoidoscopy: Not on file         Breast Cancer Screening Age: 36 years old  Frequency: every 1-2 years  Not required if history of left and right mastectomy Mammogram: 05/10/2017       Cervical Cancer Screening Between the ages of 21-29, pap smear recommended once every 3 years  Between the ages of 33-67, can perform pap smear with HPV co-testing every 5 years     Recommendations may differ for women with a history of total hysterectomy, cervical cancer, or abnormal pap smears in past  Pap Smear: Not on file       Hepatitis C Screening Once for adults born between [facility]  More frequently in patients at high risk for Hepatitis C Hep C Antibody: Not on file       Diabetes Screening 1-2 times per year if you're at risk for diabetes or have pre-diabetes Fasting glucose: 118 mg/dL   A1C: 8 8       Cholesterol Screening Once every 5 years if you don't have a lipid disorder  May order more often based on risk factors  Lipid panel: 11/16/2017         Other Preventive Screenings Covered by Medicare:  1  Abdominal Aortic Aneurysm (AAA) Screening: covered once if your at risk  You're considered to be at risk if you have a family history of AAA  2  Lung Cancer Screening: covers low dose CT scan once per year if you meet all of the following conditions: (1) Age 50-69; (2) No signs or symptoms of lung cancer; (3) Current smoker or have quit smoking within the last 15 years; (4) You have a tobacco smoking history of at least 30 pack years (packs per day multiplied by number of years you smoked); (5) You get a written order from a healthcare provider  3  Glaucoma Screening: covered annually if you're considered high risk: (1) You have diabetes OR (2) Family history of glaucoma OR (3)  aged 48 and older OR (3)  American aged 72 and older  3  Osteoporosis Screening: covered every 2 years if you meet one of the following conditions: (1) You're estrogen deficient and at risk for osteoporosis based off medical history and other findings; (2) Have a vertebral abnormality; (3) On glucocorticoid therapy for more than 3 months; (4) Have primary hyperparathyroidism; (5) On osteoporosis medications and need to assess response to drug therapy  · Last bone density test (DXA Scan): Not on file  5  HIV Screening: covered annually if you're between the age of 12-76  Also covered annually if you are younger than 13 and older than 72 with risk factors for HIV infection  For pregnant patients, it is covered up to 3 times per pregnancy      Immunizations:  Immunization Recommendations   Influenza Vaccine Annual influenza vaccination during flu season is recommended for all persons aged >= 6 months who do not have contraindications   Pneumococcal Vaccine (Prevnar and Pneumovax)  * Prevnar = PCV13  * Pneumovax = PPSV23   Adults 25-60 years old: 1-3 doses may be recommended based on certain risk factors  Adults 72 years old: Prevnar (PCV13) vaccine recommended followed by Pneumovax (PPSV23) vaccine  If already received PPSV23 since turning 65, then PCV13 recommended at least one year after PPSV23 dose  Hepatitis B Vaccine 3 dose series if at intermediate or high risk (ex: diabetes, end stage renal disease, liver disease)   Tetanus (Td) Vaccine - COST NOT COVERED BY MEDICARE PART B Following completion of primary series, a booster dose should be given every 10 years to maintain immunity against tetanus  Td may also be given as tetanus wound prophylaxis  Tdap Vaccine - COST NOT COVERED BY MEDICARE PART B Recommended at least once for all adults  For pregnant patients, recommended with each pregnancy  Shingles Vaccine (Shingrix) - COST NOT COVERED BY MEDICARE PART B  2 shot series recommended in those aged 48 and above     Health Maintenance Due:      Topic Date Due    Hepatitis C Screening  1945    MAMMOGRAM  05/10/2018    Colonoscopy Surveillance  05/30/2022    Colorectal Cancer Screening  05/30/2027     Immunizations Due:      Topic Date Due    Pneumococcal Vaccine: 65+ Years (2 of 2 - PPSV23) 12/17/2015     Advance Directives   What are advance directives? Advance directives are legal documents that state your wishes and plans for medical care  These plans are made ahead of time in case you lose your ability to make decisions for yourself  Advance directives can apply to any medical decision, such as the treatments you want, and if you want to donate organs  What are the types of advance directives? There are many types of advance directives, and each state has rules about how to use them   You may choose a combination of any of the following:  · Living will: This is a written record of the treatment you want  You can also choose which treatments you do not want, which to limit, and which to stop at a certain time  This includes surgery, medicine, IV fluid, and tube feedings  · Durable power of  for healthcare [facility]): This is a written record that states who you want to make healthcare choices for you when you are unable to make them for yourself  This person, called a proxy, is usually a family member or a friend  You may choose more than 1 proxy  · Do not resuscitate (DNR) order:  A DNR order is used in case your heart stops beating or you stop breathing  It is a request not to have certain forms of treatment, such as CPR  A DNR order may be included in other types of advance directives  · Medical directive: This covers the care that you want if you are in a coma, near death, or unable to make decisions for yourself  You can list the treatments you want for each condition  Treatment may include pain medicine, surgery, blood transfusions, dialysis, IV or tube feedings, and a ventilator (breathing machine)  · Values history: This document has questions about your views, beliefs, and how you feel and think about life  This information can help others choose the care that you would choose  Why are advance directives important? An advance directive helps you control your care  Although spoken wishes may be used, it is better to have your wishes written down  Spoken wishes can be misunderstood, or not followed  Treatments may be given even if you do not want them  An advance directive may make it easier for your family to make difficult choices about your care  Urinary Incontinence   Urinary incontinence (UI)  is when you lose control of your bladder  UI develops because your bladder cannot store or empty urine properly  The 3 most common types of UI are stress incontinence, urge incontinence, or both    Medicines:   · May be given to help strengthen your bladder control  Report any side effects of medication to your healthcare provider  Do pelvic muscle exercises often:  Your pelvic muscles help you stop urinating  Squeeze these muscles tight for 5 seconds, then relax for 5 seconds  Gradually work up to squeezing for 10 seconds  Do 3 sets of 15 repetitions a day, or as directed  This will help strengthen your pelvic muscles and improve bladder control  Train your bladder:  Go to the bathroom at set times, such as every 2 hours, even if you do not feel the urge to go  You can also try to hold your urine when you feel the urge to go  For example, hold your urine for 5 minutes when you feel the urge to go  As that becomes easier, hold your urine for 10 minutes  Self-care:   · Keep a UI record  Write down how often you leak urine and how much you leak  Make a note of what you were doing when you leaked urine  · Drink liquids as directed  You may need to limit the amount of liquid you drink to help control your urine leakage  Do not drink any liquid right before you go to bed  Limit or do not have drinks that contain caffeine or alcohol  · Prevent constipation  Eat a variety of high-fiber foods  Good examples are high-fiber cereals, beans, vegetables, and whole-grain breads  Walking is the best way to trigger your intestines to have a bowel movement  · Exercise regularly and maintain a healthy weight  Weight loss and exercise will decrease pressure on your bladder and help you control your leakage  · Use a catheter as directed  to help empty your bladder  A catheter is a tiny, plastic tube that is put into your bladder to drain your urine  · Go to behavior therapy as directed  Behavior therapy may be used to help you learn to control your urge to urinate      Weight Management   Why it is important to manage your weight:  Being overweight increases your risk of health conditions such as heart disease, high blood pressure, type 2 diabetes, and certain types of cancer  It can also increase your risk for osteoarthritis, sleep apnea, and other respiratory problems  Aim for a slow, steady weight loss  Even a small amount of weight loss can lower your risk of health problems  How to lose weight safely:  A safe and healthy way to lose weight is to eat fewer calories and get regular exercise  You can lose up about 1 pound a week by decreasing the number of calories you eat by 500 calories each day  Healthy meal plan for weight management:  A healthy meal plan includes a variety of foods, contains fewer calories, and helps you stay healthy  A healthy meal plan includes the following:  · Eat whole-grain foods more often  A healthy meal plan should contain fiber  Fiber is the part of grains, fruits, and vegetables that is not broken down by your body  Whole-grain foods are healthy and provide extra fiber in your diet  Some examples of whole-grain foods are whole-wheat breads and pastas, oatmeal, brown rice, and bulgur  · Eat a variety of vegetables every day  Include dark, leafy greens such as spinach, kale, yoel greens, and mustard greens  Eat yellow and orange vegetables such as carrots, sweet potatoes, and winter squash  · Eat a variety of fruits every day  Choose fresh or canned fruit (canned in its own juice or light syrup) instead of juice  Fruit juice has very little or no fiber  · Eat low-fat dairy foods  Drink fat-free (skim) milk or 1% milk  Eat fat-free yogurt and low-fat cottage cheese  Try low-fat cheeses such as mozzarella and other reduced-fat cheeses  · Choose meat and other protein foods that are low in fat  Choose beans or other legumes such as split peas or lentils  Choose fish, skinless poultry (chicken or turkey), or lean cuts of red meat (beef or pork)  Before you cook meat or poultry, cut off any visible fat  · Use less fat and oil  Try baking foods instead of frying them   Add less fat, such as margarine, sour cream, regular salad dressing and mayonnaise to foods  Eat fewer high-fat foods  Some examples of high-fat foods include french fries, doughnuts, ice cream, and cakes  · Eat fewer sweets  Limit foods and drinks that are high in sugar  This includes candy, cookies, regular soda, and sweetened drinks  Exercise:  Exercise at least 30 minutes per day on most days of the week  Some examples of exercise include walking, biking, dancing, and swimming  You can also fit in more physical activity by taking the stairs instead of the elevator or parking farther away from stores  Ask your healthcare provider about the best exercise plan for you  © Copyright [employer] 2018 Information is for End User's use only and may not be sold, redistributed or otherwise used for commercial purposes   All illustrations and images included in CareNotes® are the copyrighted property of [employer]  or [address] [place] [address] [facility]

## 2021-02-25 NOTE — ASSESSMENT & PLAN NOTE
No suicidal ideation working with counselor Rodrigo Staley and Psychiatry doctor Pablo House, patient reports me she is concerned about a recent prescription Lamictal, she has not started the medication, I  Will check with Psychiatry if she is a candidate for Wellbutrin augmentation therapy RTO as scheduled April call if any problems

## 2021-02-25 NOTE — ASSESSMENT & PLAN NOTE
Assessment and plan 1  Medicare subsequent annual wellness examination overall the patient is clinically stable and doing well, we encouraged the patient to follow a healthy and balanced diet  We recommend that the patient exercise routinely approximately 30 minutes 5 times per week   We have reviewed the patient's vaccines and have made recommendations for updates if necessary    COVID vaccine recommended annual flu shot in the fall      We will be ordering screening laboratories which are age appropriate  Return to the office in   April as scheduled    call if any problems

## 2021-02-25 NOTE — PROGRESS NOTES
Assessment/Plan:    Medicare annual wellness visit, subsequent    Assessment and plan 1  Medicare subsequent annual wellness examination overall the patient is clinically stable and doing well, we encouraged the patient to follow a healthy and balanced diet  We recommend that the patient exercise routinely approximately 30 minutes 5 times per week   We have reviewed the patient's vaccines and have made recommendations for updates if necessary    COVID vaccine recommended annual flu shot in the fall      We will be ordering screening laboratories which are age appropriate  Return to the office in   April as scheduled    call if any problems  Type 2 diabetes mellitus with complication, with long-term current use of insulin ([facility] )    Lab Results   Component Value Date    HGBA1C 8 8 08/13/2018    I have counselled the pt to follow a healthy and balanced diet ,and recommend routine exercise  I will be ordering diabetic laboratories including comprehensive metabolic panel, hemoglobin A1c, urine microalbumin, lipid panel  I would like the patient work with pharmacist Evangelina regarding possible Ozempic I would like her to communicate with endocrinology  Obesity    Obesity -I have counseled patient following healthy and balanced diet, I would like the patient to lose weight, I would like the patient exercise routinely; we will continue monitor the patient's progress      Pulmonary nodules    Former smoker will check a follow-up CT scan to monitor the pulmonary nodule patient request    Recurrent major depressive disorder ([facility] )   No suicidal ideation working with counselor Elva Mathews and Psychiatry doctor Dona Borjas, patient reports me she is concerned about a recent prescription Lamictal, she has not started the medication, I  Will check with Psychiatry if she is a candidate for Wellbutrin augmentation therapy RTO as scheduled April call if any problems         Problem List Items Addressed This Visit        Endocrine Type 2 diabetes mellitus with complication, with long-term current use of insulin (HCC) (Chronic)       Lab Results   Component Value Date    HGBA1C 8 8 08/13/2018    I have counselled the pt to follow a healthy and balanced diet ,and recommend routine exercise  I will be ordering diabetic laboratories including comprehensive metabolic panel, hemoglobin A1c, urine microalbumin, lipid panel  I would like the patient work with pharmacist Evangelina regarding possible Ozempic I would like her to communicate with endocrinology  Relevant Orders    Ambulatory referral to clinical pharmacy       Other    Obesity       Obesity -I have counseled patient following healthy and balanced diet, I would like the patient to lose weight, I would like the patient exercise routinely; we will continue monitor the patient's progress  Pulmonary nodules       Former smoker will check a follow-up CT scan to monitor the pulmonary nodule patient request         Medicare annual wellness visit, subsequent - Primary       Assessment and plan 1  Medicare subsequent annual wellness examination overall the patient is clinically stable and doing well, we encouraged the patient to follow a healthy and balanced diet  We recommend that the patient exercise routinely approximately 30 minutes 5 times per week   We have reviewed the patient's vaccines and have made recommendations for updates if necessary    COVID vaccine recommended annual flu shot in the fall      We will be ordering screening laboratories which are age appropriate  Return to the office in   April as scheduled    call if any problems           Need for hepatitis C screening test    Relevant Orders    Hepatitis C Antibody ([facility])    Encounter for immunization    Lung nodule    Relevant Orders    CT chest wo contrast    Recurrent major depressive disorder ([facility] )      No suicidal ideation working with counselor Andrade Miller and Psychiatry doctor Bebe Azar, patient reports me she is concerned about a recent prescription Lamictal, she has not started the medication, I  Will check with Psychiatry if she is a candidate for Wellbutrin augmentation therapy RTO as scheduled April call if any problems                RTO in April call if any problems  Subjective:      Patient ID: Laura Stout is a 77 y o  female  HPI 77-year old female coming in for a follow up office visit regarding type 2 diabetes, lung nodule, obesity, depression/anxiety; The patient reports me compliant taking medications without untoward side effects the  The patient is here to review his medical condition, update me on the medical condition and the patient reports me no hospitalizations and no ER visits  Overall is feeling well she reports me loneliness isolation secondary to COVID crisis increased depression working with Psychiatry doctor Martin Rubinstein recently prescribed Lamictal patient is concerned about potential side effects she does not want to stop Cymbalta because this medication is very helpful for her fibromyalgia and osteoarthritis  She is interested in in a different medication for depression because of the potential side effects of the Lamictal   I have sent a message to Psychiatry regarding her concern  Also she is concerned about a pulmonology nodule she has had in the past she is a former smoker and has not had follow-up in some time  Also she is concerned she is gaining weight increasing abdominal  Obesity likely secondary to the insulin she will be following up with endocrinology  The following portions of the patient's history were reviewed and updated as appropriate: allergies, current medications, past family history, past medical history, past social history, past surgical history and problem list     Review of Systems   Constitutional: Negative for activity change, appetite change and unexpected weight change  HENT: Negative for congestion  Eyes: Negative for visual disturbance  Respiratory: Negative for cough and shortness of breath  Cardiovascular: Negative for chest pain  Gastrointestinal: Negative for abdominal pain, diarrhea, nausea and vomiting  Neurological: Negative for dizziness, light-headedness and headaches  Psychiatric/Behavioral: Negative for suicidal ideas  The patient is nervous/anxious  Depression         Objective:    No follow-ups on file  No results found        Allergies   Allergen Reactions    Molds & Smuts     Other      RAGWEED, CAT DANDER, DOG DANDER     Pollen Extract Other (See Comments)     Cold symptoms         Past Medical History:   Diagnosis Date    Acute embolism and thrombosis of unspecified deep veins of unspecified lower extremity (HCC)     Last Assessed:  5/18/17    Anemia     Anosmia     Anxiety     Arthritis     Asthma     Back pain     Bilateral macular retinal edema     CAD (coronary artery disease)     Cataract     Cervical disc herniation     Cervical radiculopathy     Cervical spinal stenosis     Cervical spondylolysis     Chronic mastoiditis     Complex endometrial hyperplasia     Depression     Diabetes mellitus ([address] )     D[facility]deep venous thrombosis) ([facility]     Fibromyalgia     Hyperlipidemia     Hypertension     Hypothyroid     Lumbar radiculopathy     Neck pain     Obese     Spinal stenosis     Stomach ulcer     Thyroid disease      Past Surgical History:   Procedure Laterality Date    BACK SURGERY      CARPAL TUNNEL RELEASE      CATARACT EXTRACTION      CHOLECYSTECTOMY      COLONOSCOPY      CORONARY ANGIOPLASTY      CORONARY ARTERY BYPASS GRAFT      CYSTOSCOPY N/A 6/20/2017    Procedure: CYSTOSCOPY;  Surgeon: Farzana Ta MD;  [facility];  Service: Gynecology Oncology    ND LAPAROSCOPY W TOT HYSTERECTUTERUS <=250 GRAM  W TUBE/OVARY N/A 6/20/2017    Procedure: ROBOTIC HYSTERECTOMY; BILATERAL SALPINO-OOPHERECTOMY; umbilical hernia repair ;  Surgeon: Farzana aT MD;  [facility];  Service: Gynecology Oncology    TONSILECTOMY AND ADNOIDECTOMY      UMBILICAL HERNIA REPAIR       Current Outpatient Medications on File Prior to Visit   Medication Sig Dispense Refill    albuterol (2 5 mg/3 mL) 0 083 % nebulizer solution Take 3 mL (2 5 mg total) by nebulization every 6 (six) hours as needed for wheezing 75 mL 0    albuterol (PROAIR HFA) 90 mcg/act inhaler Inhale 2 puffs every 6 (six) hours as needed       ALPRAZolam (XANAX) 0 5 mg tablet Take 1 tablet (0 5 mg total) by mouth 2 (two) times a day as needed for anxiety 60 tablet 1    aspirin 81 MG tablet Take 81 mg by mouth daily      B-D UF III MINI PEN NEEDLES 31G X 5 MM MISC       BAQSIMI TWO PACK 3 MG/DOSE POWD Use as directed  0    cholecalciferol (VITAMIN D3) 1,000 units tablet Take 2,000 Units by mouth daily      diclofenac sodium (VOLTAREN) 1 % Apply 2 g topically 4 (four) times a day (Patient taking differently: Apply 2 g topically as needed ) 1 Tube 0    diltiazem (TIAZAC) 300 MG 24 hr capsule Take 300 mg by mouth daily      DULoxetine (CYMBALTA) 60 mg delayed release capsule Take 1 capsule (60 mg total) by mouth daily 30 capsule 2    enalapril (VASOTEC) 20 mg tablet Take 20 mg by mouth daily      insulin aspart (NovoLOG) 100 units/mL injection Inject 12 Units under the skin 3 (three) times a day before meals (Patient taking differently: Inject under the skin 3 (three) times a day before meals )  0    Insulin Disposable Pump (OMNIPOD DASH 5 PACK) MISC CHANGE PODS EVERY 2 DAYS UTD  3    isosorbide mononitrate (IMDUR) 60 mg 24 hr tablet Take 60 mg by mouth daily at bedtime      lamoTRIgine (LaMICtal) 25 mg tablet TAKE 1 TABLET DAILY  AFTER 2 WEEKS INCREASE TO 2 TABLETS DAILY 60 tablet 1    levothyroxine 50 mcg tablet Take 50 mcg by mouth daily      nitroglycerin (NITROLINGUAL) 0 4 mg/spray spray USE ONE SPRAY Q 5 MINUTES AS NEEDED FOR CHEST PAIN   IF NO RELIEF, CALL 911   1    rosuvastatin (CRESTOR) 20 MG tablet Take 20 mg by mouth every evening  2     No current facility-administered medications on file prior to visit  Family History   Problem Relation Age of Onset    Arthritis Mother     Leukemia Mother     Other Mother         Anxiety, major depressive disorder, recurrent episode with atypical features    Coronary artery disease Father         Heart problem    Diabetes Father     Other Father         Infectious disease    Alzheimer's disease Maternal Grandmother     Other Maternal Grandfather         Heart problem    Other Daughter         Anxiety, major depressive disorder, recurrent episode with atypical features    Alcohol abuse Other         Grandparent    Cancer Family     Diabetes Family     Hypertension Family     Other Family         Reported prior back trouble, thyroid disorder     Social History     Socioeconomic History    Marital status: /Civil Union     Spouse name: Not on file    Number of children: 2    Years of education: High school or GED    Highest education level: Not on file   Occupational History    Occupation: Retired   Social Needs    Financial resource strain: Not on file    Food insecurity     Worry: Not on file     Inability: Not on file   [employer] needs     Medical: Not on file     Non-medical: Not on file   Tobacco Use    Smoking status: Former Smoker     Packs/day: 1 00     Years: 6 00     Pack years: 6 00     Types: Cigarettes     Quit date:      Years since quittin 1    Smokeless tobacco: Never Used    Tobacco comment: Smoked about a half a pack for about 4 yrs  Quite about 50 yrs ago     Substance and Sexual Activity    Alcohol use: No    Drug use: No    Sexual activity: Never     Comment: No known STD risk factors   Lifestyle    Physical activity     Days per week: Not on file     Minutes per session: Not on file    Stress: Not on file   Relationships    Social connections     Talks on phone: Not on file Gets together: Not on file     Attends [employer] service: Not on file     Active member of club or organization: Not on file     Attends meetings of clubs or organizations: Not on file     Relationship status: Not on file    Intimate partner violence     Fear of current or ex partner: Not on file     Emotionally abused: Not on file     Physically abused: Not on file     Forced sexual activity: Not on file   Other Topics Concern    Not on file   Social History Narrative    Lives independently with spouse    No known risk factors    Denied:  Exercising regularly     Vitals:    02/24/21 1752   BP: 124/78   Pulse: 72   Resp: 16   SpO2: 97%   Weight: 92 6 kg (204 lb 3 2 oz)   Height: 5' 1" (1 549 m)     Results for orders placed or performed in visit on 11/11/20   CBC and Platelet   Result Value Ref Range    WBC 9 54 4 31 - 10 16 Thousand/uL    RBC 4 44 3 81 - 5 12 Million/uL    Hemoglobin 12 1 11 5 - 15 4 g/dL    Hematocrit 39 4 34 8 - 46 1 %    MCV 89 82 - 98 fL    MCH 27 3 26 8 - 34 3 pg    MCHC 30 7 (L) 31 4 - 37 4 g/dL    RDW 16 1 (H) 11 6 - 15 1 %    Platelets 698 707 - 504 Thousands/uL    MPV 11 1 8 9 - 12 7 fL   Magnesium   Result Value Ref Range    Magnesium 2 0 1 6 - 2 6 mg/dL   Renal function panel   Result Value Ref Range    Albumin 3 8 3 5 - 5 0 g/dL    Calcium 10 1 8 3 - 10 1 mg/dL    Phosphorus 3 4 2 3 - 4 1 mg/dL    Glucose 256 (H) 65 - 140 mg/dL    BUN 21 5 - 25 mg/dL    Creatinine 1 64 (H) 0 60 - 1 30 mg/dL    Sodium 142 136 - 145 mmol/L    Potassium 4 2 3 5 - 5 3 mmol/L    Chloride 105 100 - 108 mmol/L    CO2 25 21 - 32 mmol/L    ANION GAP 12 4 - 13 mmol/L    eGFR 31 ml/min/1 73sq m     Weight (last 2 days)     Date/Time   Weight    02/24/21 1752   92 6 (204 2)            Body mass index is 38 58 kg/m²    BP      Temp      Pulse     Resp      SpO2        Vitals:    02/24/21 1752   Weight: 92 6 kg (204 lb 3 2 oz)     Vitals:    02/24/21 1752   Weight: 92 6 kg (204 lb 3 2 oz)       /78   Pulse 72   Resp 16   Ht 5' 1" (1 549 m)   Wt 92 6 kg (204 lb 3 2 oz)   SpO2 97%   BMI 38 58 kg/m²          Physical Exam  Constitutional:       Appearance: She is well-developed  HENT:      Head: Normocephalic  Eyes:      General: No scleral icterus  Right eye: No discharge  Left eye: No discharge  Conjunctiva/sclera: Conjunctivae normal       Pupils: Pupils are equal, round, and reactive to light  Neck:      Musculoskeletal: Neck supple  Cardiovascular:      Rate and Rhythm: Normal rate and regular rhythm  Heart sounds: Normal heart sounds  No murmur  No friction rub  No gallop  Pulmonary:      Effort: No respiratory distress  Breath sounds: Normal breath sounds  No wheezing or rales  Abdominal:      General: Bowel sounds are normal  There is no distension  Palpations: Abdomen is soft  There is no mass  Tenderness: There is no abdominal tenderness  There is no guarding or rebound  Musculoskeletal:         General: No deformity  Lymphadenopathy:      Cervical: No cervical adenopathy  Neurological:      Mental Status: She is alert  Coordination: Coordination normal    Psychiatric:         Mood and Affect: Mood is anxious and depressed  Thought Content: Thought content does not include suicidal ideation

## 2021-02-25 NOTE — PATIENT INSTRUCTIONS
Processed effect of issues with her mother and iIt's influence on her mood  Reinforced that the relationship was reflection on mother and not on her  Provided positive reinforcement for her efforts and ability to reconcile  Reviewed coping strategies for anxiety and depression to utilize when needed

## 2021-02-25 NOTE — ASSESSMENT & PLAN NOTE
Lab Results   Component Value Date    HGBA1C 8 8 08/13/2018    I have counselled the pt to follow a healthy and balanced diet ,and recommend routine exercise  I will be ordering diabetic laboratories including comprehensive metabolic panel, hemoglobin A1c, urine microalbumin, lipid panel  I would like the patient work with pharmacist Evangelina regarding possible Ozempic I would like her to communicate with endocrinology

## 2021-02-25 NOTE — PSYCH
Assessment/Plan: Manage depression     There are no diagnoses linked to this encounter  Subjective: Fawad Dickey reports she has been feeling less depressed as she has finally reconciled her relationship with her mother and has come to accept that there were parts of her mother she very much disliked  The guilt she has felt over the years has affected her mood  Continues to deal with stress related to  and her daughter but managing best she can  The isolation created by pandemic and worsened by winter weather not helpful  Hopes to be more active and engaged with grandchildren and select friends moving forward  Denies any SI  Very invested in her family  Patient ID: Flash Tripp is a 77 y o  female  Met with Fawad Dickey for 50 minutes from 6:30PM-7:20PM  Feels less depressed since she has been able to reconcile and accept what her relationship with her mother was and not feel guilty or cheated given the nature of the relationship  Review of Systems   Psychiatric/Behavioral: The patient is nervous/anxious  Objective: Fawad Dickey presents as mildly anxious but is verbal, cooperative, well oriented and engaged  Physical Exam  Psychiatric:         Attention and Perception: Attention and perception normal          Mood and Affect: Affect normal  Mood is anxious  Speech: Speech normal          Behavior: Behavior normal  Behavior is cooperative  Thought Content:  Thought content normal          Cognition and Memory: Cognition and memory normal          Judgment: Judgment normal

## 2021-02-26 ENCOUNTER — TELEPHONE (OUTPATIENT)
Dept: PSYCHIATRY | Facility: CLINIC | Age: 76
End: 2021-02-26

## 2021-02-26 NOTE — TELEPHONE ENCOUNTER
Janice Mullins cancelled appt due to snow, and the people did not clean the drives  Janice Mullins has not taken lamictal  Anxious about lamictal, a lot of perseveration  Knows family member on it, and made correlations/brought up fears       Talked about kidney function, but she is adamant about the need for cymbalta    New appt: 3/30 1011g

## 2021-03-12 DIAGNOSIS — F41.9 ANXIETY: ICD-10-CM

## 2021-03-14 RX ORDER — ALPRAZOLAM 0.5 MG/1
0.5 TABLET ORAL 2 TIMES DAILY PRN
Qty: 60 TABLET | Refills: 1 | Status: SHIPPED | OUTPATIENT
Start: 2021-03-14 | End: 2021-05-12 | Stop reason: SDUPTHER

## 2021-03-20 ENCOUNTER — HOSPITAL ENCOUNTER (OUTPATIENT)
Dept: CT IMAGING | Facility: HOSPITAL | Age: 76
Discharge: HOME | End: 2021-03-20
Payer: COMMERCIAL

## 2021-03-20 DIAGNOSIS — R91.1 LUNG NODULE: ICD-10-CM

## 2021-03-20 PROCEDURE — 71250 CT THORAX DX C-: CPT

## 2021-03-20 PROCEDURE — G1004 CDSM NDSC: HCPCS

## 2021-03-30 ENCOUNTER — OFFICE VISIT (OUTPATIENT)
Dept: PSYCHIATRY | Facility: CLINIC | Age: 76
End: 2021-03-30
Payer: COMMERCIAL

## 2021-03-30 DIAGNOSIS — F41.0 PANIC ATTACKS: ICD-10-CM

## 2021-03-30 DIAGNOSIS — F33.2 MDD (MAJOR DEPRESSIVE DISORDER), RECURRENT SEVERE, WITHOUT PSYCHOSIS (HCC): ICD-10-CM

## 2021-03-30 DIAGNOSIS — F41.1 GAD (GENERALIZED ANXIETY DISORDER): Primary | ICD-10-CM

## 2021-03-30 PROBLEM — F33.9 RECURRENT MAJOR DEPRESSIVE DISORDER (HCC): Status: RESOLVED | Noted: 2021-02-24 | Resolved: 2021-03-30

## 2021-03-30 PROCEDURE — 1160F RVW MEDS BY RX/DR IN RCRD: CPT | Performed by: PSYCHIATRY & NEUROLOGY

## 2021-03-30 PROCEDURE — 99213 OFFICE O/P EST LOW 20 MIN: CPT | Performed by: PSYCHIATRY & NEUROLOGY

## 2021-03-30 PROCEDURE — 1036F TOBACCO NON-USER: CPT | Performed by: PSYCHIATRY & NEUROLOGY

## 2021-03-30 PROCEDURE — 90833 PSYTX W PT W E/M 30 MIN: CPT | Performed by: PSYCHIATRY & NEUROLOGY

## 2021-03-30 RX ORDER — DULOXETIN HYDROCHLORIDE 60 MG/1
60 CAPSULE, DELAYED RELEASE ORAL DAILY
Qty: 30 CAPSULE | Refills: 2 | Status: SHIPPED | OUTPATIENT
Start: 2021-03-30 | End: 2021-05-07 | Stop reason: SDUPTHER

## 2021-03-30 NOTE — PSYCH
This note was not shared with the patient due to this is a psychotherapy note   Janice James can be very indecisive, and self sabotaging inadvertently        5) Other: Support as needed   - ok relationship with , up and down   - mom's death in facility hard on her (6/2020)   - daughter struggling, has 3 kids total   - Children: 3
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
## 2021-04-09 ENCOUNTER — RA CDI HCC (OUTPATIENT)
Dept: OTHER | Facility: HOSPITAL | Age: 76
End: 2021-04-09

## 2021-04-09 NOTE — PROGRESS NOTES
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 [employer]  coding oppertunities             Chart reviewed, (number of) suggestions sent to provider: 3                   [facility] [employer]  coding opportunities             Chart reviewed, (number of) suggestions sent to provider: 3           Patients insurance company: [employer] (Medicare Advantage and Commercial)     Visit status: Patient arrived for their scheduled appointment     Provider never responded to [facility] [employer]  coding request     Code(s) not used

## 2021-04-16 ENCOUNTER — OFFICE VISIT (OUTPATIENT)
Dept: INTERNAL MEDICINE CLINIC | Facility: CLINIC | Age: 76
End: 2021-04-16
Payer: COMMERCIAL

## 2021-04-16 VITALS
OXYGEN SATURATION: 98 % | DIASTOLIC BLOOD PRESSURE: 62 MMHG | BODY MASS INDEX: 38.21 KG/M2 | WEIGHT: 202.4 LBS | SYSTOLIC BLOOD PRESSURE: 158 MMHG | HEART RATE: 75 BPM | HEIGHT: 61 IN

## 2021-04-16 DIAGNOSIS — E66.9 CLASS 2 OBESITY WITH BODY MASS INDEX (BMI) OF 39.0 TO 39.9 IN ADULT, UNSPECIFIED OBESITY TYPE, UNSPECIFIED WHETHER SERIOUS COMORBIDITY PRESENT: ICD-10-CM

## 2021-04-16 DIAGNOSIS — Z11.59 NEED FOR HEPATITIS C SCREENING TEST: ICD-10-CM

## 2021-04-16 DIAGNOSIS — E78.5 HYPERLIPIDEMIA, UNSPECIFIED HYPERLIPIDEMIA TYPE: ICD-10-CM

## 2021-04-16 DIAGNOSIS — Z00.00 MEDICARE ANNUAL WELLNESS VISIT, SUBSEQUENT: Primary | ICD-10-CM

## 2021-04-16 DIAGNOSIS — Z23 ENCOUNTER FOR IMMUNIZATION: ICD-10-CM

## 2021-04-16 DIAGNOSIS — R91.8 PULMONARY NODULES: ICD-10-CM

## 2021-04-16 DIAGNOSIS — E11.8 TYPE 2 DIABETES MELLITUS WITH COMPLICATION, WITH LONG-TERM CURRENT USE OF INSULIN (HCC): ICD-10-CM

## 2021-04-16 DIAGNOSIS — F41.9 ANXIETY: ICD-10-CM

## 2021-04-16 DIAGNOSIS — E11.42 DIABETIC POLYNEUROPATHY ASSOCIATED WITH TYPE 2 DIABETES MELLITUS (HCC): ICD-10-CM

## 2021-04-16 DIAGNOSIS — F33.2 MDD (MAJOR DEPRESSIVE DISORDER), RECURRENT SEVERE, WITHOUT PSYCHOSIS (HCC): ICD-10-CM

## 2021-04-16 DIAGNOSIS — Z79.4 TYPE 2 DIABETES MELLITUS WITH COMPLICATION, WITH LONG-TERM CURRENT USE OF INSULIN (HCC): ICD-10-CM

## 2021-04-16 LAB — SL AMB POCT HEMOGLOBIN AIC: 8.4 (ref ?–6.5)

## 2021-04-16 PROCEDURE — 1170F FXNL STATUS ASSESSED: CPT | Performed by: INTERNAL MEDICINE

## 2021-04-16 PROCEDURE — 99214 OFFICE O/P EST MOD 30 MIN: CPT | Performed by: INTERNAL MEDICINE

## 2021-04-16 PROCEDURE — 3288F FALL RISK ASSESSMENT DOCD: CPT | Performed by: INTERNAL MEDICINE

## 2021-04-16 PROCEDURE — 1125F AMNT PAIN NOTED PAIN PRSNT: CPT | Performed by: INTERNAL MEDICINE

## 2021-04-16 PROCEDURE — 83036 HEMOGLOBIN GLYCOSYLATED A1C: CPT | Performed by: INTERNAL MEDICINE

## 2021-04-16 PROCEDURE — G0439 PPPS, SUBSEQ VISIT: HCPCS | Performed by: INTERNAL MEDICINE

## 2021-04-16 PROCEDURE — 3725F SCREEN DEPRESSION PERFORMED: CPT | Performed by: INTERNAL MEDICINE

## 2021-04-16 PROCEDURE — 3052F HG A1C>EQUAL 8.0%<EQUAL 9.0%: CPT | Performed by: NURSE PRACTITIONER

## 2021-04-16 NOTE — PATIENT INSTRUCTIONS
Medicare Preventive Visit Patient Instructions  Thank you for completing your Welcome to Medicare Visit or Medicare Annual Wellness Visit today  Your next wellness visit will be due in one year (4/17/2022)  The screening/preventive services that you may require over the next 5-10 years are detailed below  Some tests may not apply to you based off risk factors and/or age  Screening tests ordered at today's visit but not completed yet may show as past due  Also, please note that scanned in results may not display below  Preventive Screenings:  Service Recommendations Previous Testing/Comments   Colorectal Cancer Screening  * Colonoscopy    * Fecal Occult Blood Test (FOBT)/Fecal Immunochemical Test (FIT)  * Fecal DNA/Cologuard Test  * Flexible Sigmoidoscopy Age: 54-65 years old   Colonoscopy: every 10 years (may be performed more frequently if at higher risk)  OR  FOBT/FIT: every 1 year  OR  Cologuard: every 3 years  OR  Sigmoidoscopy: every 5 years  Screening may be recommended earlier than age 48 if at higher risk for colorectal cancer  Also, an individualized decision between you and your healthcare provider will decide whether screening between the ages of 74-80 would be appropriate  Colonoscopy: 05/30/2017  FOBT/FIT: Not on file  Cologuard: Not on file  Sigmoidoscopy: Not on file          Breast Cancer Screening Age: 36 years old  Frequency: every 1-2 years  Not required if history of left and right mastectomy Mammogram: 05/10/2017        Cervical Cancer Screening Between the ages of 21-29, pap smear recommended once every 3 years  Between the ages of 33-67, can perform pap smear with HPV co-testing every 5 years     Recommendations may differ for women with a history of total hysterectomy, cervical cancer, or abnormal pap smears in past  Pap Smear: Not on file        Hepatitis C Screening Once for adults born between [facility]  More frequently in patients at high risk for Hepatitis C Hep C Antibody: Not on file        Diabetes Screening 1-2 times per year if you're at risk for diabetes or have pre-diabetes Fasting glucose: 118 mg/dL   A1C: 8 8        Cholesterol Screening Once every 5 years if you don't have a lipid disorder  May order more often based on risk factors  Lipid panel: 11/16/2017          Other Preventive Screenings Covered by Medicare:  1  Abdominal Aortic Aneurysm (AAA) Screening: covered once if your at risk  You're considered to be at risk if you have a family history of AAA  2  Lung Cancer Screening: covers low dose CT scan once per year if you meet all of the following conditions: (1) Age 50-69; (2) No signs or symptoms of lung cancer; (3) Current smoker or have quit smoking within the last 15 years; (4) You have a tobacco smoking history of at least 30 pack years (packs per day multiplied by number of years you smoked); (5) You get a written order from a healthcare provider  3  Glaucoma Screening: covered annually if you're considered high risk: (1) You have diabetes OR (2) Family history of glaucoma OR (3)  aged 48 and older OR (3)  American aged 72 and older  3  Osteoporosis Screening: covered every 2 years if you meet one of the following conditions: (1) You're estrogen deficient and at risk for osteoporosis based off medical history and other findings; (2) Have a vertebral abnormality; (3) On glucocorticoid therapy for more than 3 months; (4) Have primary hyperparathyroidism; (5) On osteoporosis medications and need to assess response to drug therapy  · Last bone density test (DXA Scan): Not on file  5  HIV Screening: covered annually if you're between the age of 12-76  Also covered annually if you are younger than 13 and older than 72 with risk factors for HIV infection  For pregnant patients, it is covered up to 3 times per pregnancy      Immunizations:  Immunization Recommendations   Influenza Vaccine Annual influenza vaccination during flu season is recommended for all persons aged >= 6 months who do not have contraindications   Pneumococcal Vaccine (Prevnar and Pneumovax)  * Prevnar = PCV13  * Pneumovax = PPSV23   Adults 25-60 years old: 1-3 doses may be recommended based on certain risk factors  Adults 72 years old: Prevnar (PCV13) vaccine recommended followed by Pneumovax (PPSV23) vaccine  If already received PPSV23 since turning 65, then PCV13 recommended at least one year after PPSV23 dose  Hepatitis B Vaccine 3 dose series if at intermediate or high risk (ex: diabetes, end stage renal disease, liver disease)   Tetanus (Td) Vaccine - COST NOT COVERED BY MEDICARE PART B Following completion of primary series, a booster dose should be given every 10 years to maintain immunity against tetanus  Td may also be given as tetanus wound prophylaxis  Tdap Vaccine - COST NOT COVERED BY MEDICARE PART B Recommended at least once for all adults  For pregnant patients, recommended with each pregnancy  Shingles Vaccine (Shingrix) - COST NOT COVERED BY MEDICARE PART B  2 shot series recommended in those aged 48 and above     Health Maintenance Due:      Topic Date Due    Hepatitis C Screening  Never done    MAMMOGRAM  05/10/2018    Colonoscopy Surveillance  05/30/2022    Colorectal Cancer Screening  05/30/2027     Immunizations Due:      Topic Date Due    Pneumococcal Vaccine: 65+ Years (2 of 2 - PPSV23) 12/17/2015     Advance Directives   What are advance directives? Advance directives are legal documents that state your wishes and plans for medical care  These plans are made ahead of time in case you lose your ability to make decisions for yourself  Advance directives can apply to any medical decision, such as the treatments you want, and if you want to donate organs  What are the types of advance directives? There are many types of advance directives, and each state has rules about how to use them   You may choose a combination of any of the following:  · Living will: This is a written record of the treatment you want  You can also choose which treatments you do not want, which to limit, and which to stop at a certain time  This includes surgery, medicine, IV fluid, and tube feedings  · Durable power of  for healthcare [facility]): This is a written record that states who you want to make healthcare choices for you when you are unable to make them for yourself  This person, called a proxy, is usually a family member or a friend  You may choose more than 1 proxy  · Do not resuscitate (DNR) order:  A DNR order is used in case your heart stops beating or you stop breathing  It is a request not to have certain forms of treatment, such as CPR  A DNR order may be included in other types of advance directives  · Medical directive: This covers the care that you want if you are in a coma, near death, or unable to make decisions for yourself  You can list the treatments you want for each condition  Treatment may include pain medicine, surgery, blood transfusions, dialysis, IV or tube feedings, and a ventilator (breathing machine)  · Values history: This document has questions about your views, beliefs, and how you feel and think about life  This information can help others choose the care that you would choose  Why are advance directives important? An advance directive helps you control your care  Although spoken wishes may be used, it is better to have your wishes written down  Spoken wishes can be misunderstood, or not followed  Treatments may be given even if you do not want them  An advance directive may make it easier for your family to make difficult choices about your care  Urinary Incontinence   Urinary incontinence (UI)  is when you lose control of your bladder  UI develops because your bladder cannot store or empty urine properly  The 3 most common types of UI are stress incontinence, urge incontinence, or both    Medicines:   · May be given to help strengthen your bladder control  Report any side effects of medication to your healthcare provider  Do pelvic muscle exercises often:  Your pelvic muscles help you stop urinating  Squeeze these muscles tight for 5 seconds, then relax for 5 seconds  Gradually work up to squeezing for 10 seconds  Do 3 sets of 15 repetitions a day, or as directed  This will help strengthen your pelvic muscles and improve bladder control  Train your bladder:  Go to the bathroom at set times, such as every 2 hours, even if you do not feel the urge to go  You can also try to hold your urine when you feel the urge to go  For example, hold your urine for 5 minutes when you feel the urge to go  As that becomes easier, hold your urine for 10 minutes  Self-care:   · Keep a UI record  Write down how often you leak urine and how much you leak  Make a note of what you were doing when you leaked urine  · Drink liquids as directed  You may need to limit the amount of liquid you drink to help control your urine leakage  Do not drink any liquid right before you go to bed  Limit or do not have drinks that contain caffeine or alcohol  · Prevent constipation  Eat a variety of high-fiber foods  Good examples are high-fiber cereals, beans, vegetables, and whole-grain breads  Walking is the best way to trigger your intestines to have a bowel movement  · Exercise regularly and maintain a healthy weight  Weight loss and exercise will decrease pressure on your bladder and help you control your leakage  · Use a catheter as directed  to help empty your bladder  A catheter is a tiny, plastic tube that is put into your bladder to drain your urine  · Go to behavior therapy as directed  Behavior therapy may be used to help you learn to control your urge to urinate      Weight Management   Why it is important to manage your weight:  Being overweight increases your risk of health conditions such as heart disease, high blood pressure, type 2 diabetes, and certain types of cancer  It can also increase your risk for osteoarthritis, sleep apnea, and other respiratory problems  Aim for a slow, steady weight loss  Even a small amount of weight loss can lower your risk of health problems  How to lose weight safely:  A safe and healthy way to lose weight is to eat fewer calories and get regular exercise  You can lose up about 1 pound a week by decreasing the number of calories you eat by 500 calories each day  Healthy meal plan for weight management:  A healthy meal plan includes a variety of foods, contains fewer calories, and helps you stay healthy  A healthy meal plan includes the following:  · Eat whole-grain foods more often  A healthy meal plan should contain fiber  Fiber is the part of grains, fruits, and vegetables that is not broken down by your body  Whole-grain foods are healthy and provide extra fiber in your diet  Some examples of whole-grain foods are whole-wheat breads and pastas, oatmeal, brown rice, and bulgur  · Eat a variety of vegetables every day  Include dark, leafy greens such as spinach, kale, yoel greens, and mustard greens  Eat yellow and orange vegetables such as carrots, sweet potatoes, and winter squash  · Eat a variety of fruits every day  Choose fresh or canned fruit (canned in its own juice or light syrup) instead of juice  Fruit juice has very little or no fiber  · Eat low-fat dairy foods  Drink fat-free (skim) milk or 1% milk  Eat fat-free yogurt and low-fat cottage cheese  Try low-fat cheeses such as mozzarella and other reduced-fat cheeses  · Choose meat and other protein foods that are low in fat  Choose beans or other legumes such as split peas or lentils  Choose fish, skinless poultry (chicken or turkey), or lean cuts of red meat (beef or pork)  Before you cook meat or poultry, cut off any visible fat  · Use less fat and oil  Try baking foods instead of frying them   Add less fat, such as margarine, sour cream, regular salad dressing and mayonnaise to foods  Eat fewer high-fat foods  Some examples of high-fat foods include french fries, doughnuts, ice cream, and cakes  · Eat fewer sweets  Limit foods and drinks that are high in sugar  This includes candy, cookies, regular soda, and sweetened drinks  Exercise:  Exercise at least 30 minutes per day on most days of the week  Some examples of exercise include walking, biking, dancing, and swimming  You can also fit in more physical activity by taking the stairs instead of the elevator or parking farther away from stores  Ask your healthcare provider about the best exercise plan for you  © Copyright [employer] 2018 Information is for End User's use only and may not be sold, redistributed or otherwise used for commercial purposes   All illustrations and images included in CareNotes® are the copyrighted property of [employer]  or [address] [place] [address]
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
## 2021-04-18 NOTE — ASSESSMENT & PLAN NOTE
Assessment and plan 1  Medicare subsequent annual wellness examination overall the patient is clinically stable and doing well, we encouraged the patient to follow a healthy and balanced diet  We recommend that the patient exercise routinely approximately 30 minutes 5 times per week   We have reviewed the patient's vaccines and have made recommendations for updates if necessary    She has received the COVID vaccine, annual flu shot recommended, consider Shingrix vaccine     We will be ordering screening laboratories which are age appropriate  Return to the office in    4 months   call if any problems

## 2021-04-18 NOTE — PROGRESS NOTES
Assessment/Plan:    Medicare annual wellness visit, subsequent    Assessment and plan 1  Medicare subsequent annual wellness examination overall the patient is clinically stable and doing well, we encouraged the patient to follow a healthy and balanced diet  We recommend that the patient exercise routinely approximately 30 minutes 5 times per week   We have reviewed the patient's vaccines and have made recommendations for updates if necessary    She has received the COVID vaccine, annual flu shot recommended, consider Shingrix vaccine     We will be ordering screening laboratories which are age appropriate  Return to the office in    4 months   call if any problems  Diabetic polyneuropathy associated with type 2 diabetes mellitus ([place] [address] )    Lab Results   Component Value Date    HGBA1C 8 4 (A) 04/16/2021    I have counselled the pt to follow a healthy and balanced diet ,and recommend routine exercise  I will be ordering diabetic laboratories including comprehensive metabolic panel, hemoglobin A1c, urine microalbumin, lipid panel  Target A1c under 7 foot examination completed    Hyperlipidemia   Hyperlipidemia controlled continue with current medical regiment recommend a low-cholesterol diet and recommend routine exercise we will continue to monitor the progress  Continue Crestor 20 mg once daily    Need for hepatitis C screening test   Counseled, check hepatitis C antibody    Obesity   Obesity -I have counseled patient following healthy and balanced diet, I would like the patient to lose weight, I would like the patient exercise routinely; we will continue monitor the patient's progress      Pulmonary nodules    Review the recent CT scan, stable pulmonary nodule for many years radiologist recommends no further CT scan    MDD (major depressive disorder), recurrent severe, without psychosis ([place] [address] )   No SI on Cymbalta 60 mg once daily working with counselor and Psychiatry symptoms of bereavement, depression Problem List Items Addressed This Visit        Endocrine    Type 2 diabetes mellitus with complication, with long-term current use of insulin (HCC) (Chronic)    Relevant Orders    POCT hemoglobin A1c (Completed)    Diabetic foot exam    Comprehensive metabolic panel    Lipid Panel with Direct LDL reflex    Microalbumin / creatinine urine ratio    Diabetic polyneuropathy associated with type 2 diabetes mellitus (HCC)       Lab Results   Component Value Date    HGBA1C 8 4 (A) 04/16/2021    I have counselled the pt to follow a healthy and balanced diet ,and recommend routine exercise  I will be ordering diabetic laboratories including comprehensive metabolic panel, hemoglobin A1c, urine microalbumin, lipid panel  Target A1c under 7 foot examination completed            Other    Hyperlipidemia (Chronic)      Hyperlipidemia controlled continue with current medical regiment recommend a low-cholesterol diet and recommend routine exercise we will continue to monitor the progress  Continue Crestor 20 mg once daily         MDD (major depressive disorder), recurrent severe, without psychosis ([facility] [address] )      No SI on Cymbalta 60 mg once daily working with counselor and Psychiatry symptoms of bereavement, depression          Obesity      Obesity -I have counseled patient following healthy and balanced diet, I would like the patient to lose weight, I would like the patient exercise routinely; we will continue monitor the patient's progress  Pulmonary nodules       Review the recent CT scan, stable pulmonary nodule for many years radiologist recommends no further CT scan         Medicare annual wellness visit, subsequent - Primary       Assessment and plan 1  Medicare subsequent annual wellness examination overall the patient is clinically stable and doing well, we encouraged the patient to follow a healthy and balanced diet    We recommend that the patient exercise routinely approximately 30 minutes 5 times per week    We have reviewed the patient's vaccines and have made recommendations for updates if necessary    She has received the COVID vaccine, annual flu shot recommended, consider Shingrix vaccine     We will be ordering screening laboratories which are age appropriate  Return to the office in    4 months   call if any problems  Need for hepatitis C screening test      Counseled, check hepatitis C antibody         Relevant Orders    Hepatitis C Antibody ([facility])    Encounter for immunization      Other Visit Diagnoses     Anxiety             return to office 6  months  call if any problems    Subjective:      Patient ID: Sonia Roberson is a 77 y o  female  HPI 77-year old female coming in for a follow up office visit regarding type 2 diabetes, obesity, diabetic polyneuropathy, depression, pulmonary nodules; The patient reports me compliant taking medications without untoward side effects the  The patient is here to review his medical condition, update me on the medical condition and the patient reports me no hospitalizations and no ER visits  She reports me her diet could be better she has received her COVID vaccine  She reports me symptoms of depression/guilt  She is working with a counselor and Psychiatry currently on Cymbalta  She reports me she uses Xanax for anxiety which is very helpful she tolerates well  She would like to review the CT scan in detail today, she is due for her foot examination  No falls/no injuries    The following portions of the patient's history were reviewed and updated as appropriate: allergies, current medications, past family history, past medical history, past social history, past surgical history and problem list     Review of Systems   Constitutional: Negative for activity change, appetite change and unexpected weight change  Eyes: Negative for visual disturbance  Respiratory: Negative for cough and shortness of breath      Cardiovascular: Negative for chest pain  Gastrointestinal: Negative for abdominal pain, diarrhea, nausea and vomiting  Neurological: Negative for dizziness, light-headedness and headaches  Psychiatric/Behavioral: Negative for suicidal ideas  The patient is nervous/anxious  Depression         Objective:    No follow-ups on file  Procedure: Ct Chest Wo Contrast    Result Date: 3/25/2021  Narrative: CT CHEST WITHOUT IV CONTRAST INDICATION:   R91 1: Solitary pulmonary nodule  Former smoker  COMPARISON:  CT scan 6/8/2017 and 5/13/2019 TECHNIQUE: CT examination of the chest was performed without intravenous contrast   Axial, sagittal, and coronal 2D reformatted images were created from the source data and submitted for interpretation  Radiation dose length product (DLP) for this visit:  132 mGy-cm   This examination, like all CT scans performed in the [facility], was performed utilizing techniques to minimize radiation dose exposure, including the use of iterative reconstruction and automated exposure control  FINDINGS: LUNGS:  Stable 6 mm right middle lobe nodule image 3/61 and 3 mm right middle lobe nodule image 3/56  No new nodule  No consolidation  Minimal scarring in the lingula  No endotracheal or endobronchial lesion  PLEURA:  Unremarkable  HEART/GREAT VESSELS:  Heart top normal size status post CABG  No aortic aneurysm  MEDIASTINUM AND SAMANTHA:  Unremarkable  CHEST WALL AND LOWER NECK:   Unremarkable  VISUALIZED STRUCTURES IN THE UPPER ABDOMEN:  Unremarkable  OSSEOUS STRUCTURES:  No acute fracture or destructive osseous lesion  Impression: Stable benign right middle lobe nodules measuring 3 and 6 mm  Nothing new or suspicious  No follow-up is required   Workstation performed: [id number]         Allergies   Allergen Reactions    Molds & Smuts Allergic Rhinitis    Other Allergic Rhinitis     RAGWEED, CAT DANDER, DOG DANDER     Pollen Extract Other (See Comments)     Cold symptoms         Past Medical History:   Diagnosis Date    Acute embolism and thrombosis of unspecified deep veins of unspecified lower extremity (HCC)     Last Assessed:  5/18/17    Anemia     Anosmia     Anxiety     Arthritis     Asthma     Back pain     Bilateral macular retinal edema     CAD (coronary artery disease)     Cataract     Cervical disc herniation     Cervical radiculopathy     Cervical spinal stenosis     Cervical spondylolysis     Chronic mastoiditis     Complex endometrial hyperplasia     Depression     Diabetes mellitus ([address] )     DVT (deep venous thrombosis) (HCC)     Fibromyalgia     Hyperlipidemia     Hypertension     Hypothyroid     Lumbar radiculopathy     Neck pain     Obese     Spinal stenosis     Stomach ulcer     Thyroid disease      Past Surgical History:   Procedure Laterality Date    BACK SURGERY      CARPAL TUNNEL RELEASE      CATARACT EXTRACTION      CHOLECYSTECTOMY      COLONOSCOPY      CORONARY ANGIOPLASTY      CORONARY ARTERY BYPASS GRAFT      CYSTOSCOPY N/A 6/20/2017    Procedure: Prakash Burnett;  Surgeon: Dio Kay MD;  [facility];  Service: Gynecology Oncology    AZ LAPAROSCOPY W TOT HYSTERECTUTERUS <=250 GRAM  W TUBE/OVARY N/A 6/20/2017    Procedure: ROBOTIC HYSTERECTOMY; BILATERAL SALPINO-OOPHERECTOMY; umbilical hernia repair ;  Surgeon: Dio Kay MD;  [facility];  Service: Gynecology Oncology    TONSILECTOMY AND ADNOIDECTOMY      UMBILICAL HERNIA REPAIR       Current Outpatient Medications on File Prior to Visit   Medication Sig Dispense Refill    albuterol (2 5 mg/3 mL) 0 083 % nebulizer solution Take 3 mL (2 5 mg total) by nebulization every 6 (six) hours as needed for wheezing 75 mL 0    albuterol (PROAIR HFA) 90 mcg/act inhaler Inhale 2 puffs every 6 (six) hours as needed       ALPRAZolam (XANAX) 0 5 mg tablet Take 1 tablet (0 5 mg total) by mouth 2 (two) times a day as needed for anxiety 60 tablet 1    aspirin 81 MG tablet Take 81 mg by mouth daily      BAQSIMI TWO PACK 3 MG/DOSE POWD Use as directed  0    cholecalciferol (VITAMIN D3) 1,000 units tablet Take 2,000 Units by mouth daily      diltiazem (TIAZAC) 300 MG 24 hr capsule Take 300 mg by mouth daily      DULoxetine (CYMBALTA) 60 mg delayed release capsule Take 1 capsule (60 mg total) by mouth daily 30 capsule 2    enalapril (VASOTEC) 20 mg tablet Take 20 mg by mouth daily      insulin aspart (NovoLOG) 100 units/mL injection Inject 12 Units under the skin 3 (three) times a day before meals (Patient taking differently: Inject under the skin 3 (three) times a day before meals )  0    Insulin Disposable Pump (OMNIPOD DASH 5 PACK) MISC CHANGE PODS EVERY 2 DAYS UTD  3    isosorbide mononitrate (IMDUR) 60 mg 24 hr tablet Take 60 mg by mouth daily at bedtime      levothyroxine 50 mcg tablet Take 50 mcg by mouth daily      rosuvastatin (CRESTOR) 20 MG tablet Take 20 mg by mouth every evening  2    B-D UF III MINI PEN NEEDLES 31G X 5 MM MISC       diclofenac sodium (VOLTAREN) 1 % Apply 2 g topically 4 (four) times a day (Patient not taking: Reported on 4/16/2021) 1 Tube 0    nitroglycerin (NITROLINGUAL) 0 4 mg/spray spray USE ONE SPRAY Q 5 MINUTES AS NEEDED FOR CHEST PAIN  IF NO RELIEF, CALL 911   1     No current facility-administered medications on file prior to visit        Family History   Problem Relation Age of Onset    Arthritis Mother     Leukemia Mother     Other Mother         Anxiety, major depressive disorder, recurrent episode with atypical features    Coronary artery disease Father         Heart problem    Diabetes Father     Other Father         Infectious disease    Alzheimer's disease Maternal Grandmother     Other Maternal Grandfather         Heart problem    Other Daughter         Anxiety, major depressive disorder, recurrent episode with atypical features    Alcohol abuse Other         Grandparent    Cancer Family     Diabetes Family     Hypertension Family     Other Family         Reported prior back trouble, thyroid disorder     Social History     Socioeconomic History    Marital status: /Civil Union     Spouse name: Not on file    Number of children: 2    Years of education: High school or GED    Highest education level: Not on file   Occupational History    Occupation: Retired   Social Needs    Financial resource strain: Not on file    Food insecurity     Worry: Not on file     Inability: Not on file   [employer] needs     Medical: Not on file     Non-medical: Not on file   Tobacco Use    Smoking status: Former Smoker     Packs/day: 1      Years: 6 00     Pack years: 6 00     Types: Cigarettes     Quit date:      Years since quittin 3    Smokeless tobacco: Never Used    Tobacco comment: Smoked about a half a pack for about 4 yrs  Quite about 50 yrs ago     Substance and Sexual Activity    Alcohol use: No    Drug use: No    Sexual activity: Never     Comment: No known STD risk factors   Lifestyle    Physical activity     Days per week: Not on file     Minutes per session: Not on file    Stress: Not on file   Relationships    Social connections     Talks on phone: Not on file     Gets together: Not on file     Attends Worship service: Not on file     Active member of club or organization: Not on file     Attends meetings of clubs or organizations: Not on file     Relationship status: Not on file    Intimate partner violence     Fear of current or ex partner: Not on file     Emotionally abused: Not on file     Physically abused: Not on file     Forced sexual activity: Not on file   Other Topics Concern    Not on file   Social History Narrative    Lives independently with spouse    No known risk factors    Denied:  Exercising regularly     Vitals:    21 1132   BP: 158/62   Pulse: 75   SpO2: 98%   Weight: 91 8 kg (202 lb 6 4 oz)   Height: 5' 1" (1 549 m)     Results for orders placed or performed in visit on 04/16/21   POCT hemoglobin A1c   Result Value Ref Range    Hemoglobin A1C 8 4 (A) 6 5     Weight (last 2 days)     Date/Time   Weight    04/16/21 1132   91 8 (202 4)            Body mass index is 38 24 kg/m²  BP      Temp      Pulse     Resp      SpO2        Vitals:    04/16/21 1132   Weight: 91 8 kg (202 lb 6 4 oz)     Vitals:    04/16/21 1132   Weight: 91 8 kg (202 lb 6 4 oz)       /62   Pulse 75   Ht 5' 1" (1 549 m)   Wt 91 8 kg (202 lb 6 4 oz)   SpO2 98%   BMI 38 24 kg/m²          Physical Exam  Constitutional:       Appearance: She is well-developed  HENT:      Head: Normocephalic  Eyes:      General: No scleral icterus  Right eye: No discharge  Left eye: No discharge  Conjunctiva/sclera: Conjunctivae normal       Pupils: Pupils are equal, round, and reactive to light  Neck:      Musculoskeletal: Neck supple  Cardiovascular:      Rate and Rhythm: Normal rate and regular rhythm  Pulses: no weak pulses          Dorsalis pedis pulses are 2+ on the right side and 2+ on the left side  Posterior tibial pulses are 2+ on the right side and 2+ on the left side  Heart sounds: Normal heart sounds  No murmur  No friction rub  No gallop  Pulmonary:      Effort: No respiratory distress  Breath sounds: Normal breath sounds  No wheezing or rales  Abdominal:      General: Bowel sounds are normal  There is no distension  Palpations: Abdomen is soft  There is no mass  Tenderness: There is no abdominal tenderness  There is no guarding or rebound  Musculoskeletal:         General: No deformity  Feet:      Right foot:      Skin integrity: No ulcer, skin breakdown, erythema, warmth, callus or dry skin  Left foot:      Skin integrity: No ulcer, skin breakdown, erythema, warmth, callus or dry skin  Lymphadenopathy:      Cervical: No cervical adenopathy  Neurological:      Mental Status: She is alert        Coordination: Coordination normal    Psychiatric:         Mood and Affect: Mood is anxious and depressed  Thought Content: Thought content does not include suicidal ideation  Patient's shoes and socks removed  Right Foot/Ankle   Right Foot Inspection  Skin Exam: skin normal and skin intact no dry skin, no warmth, no callus, no erythema, no maceration, no abnormal color, no pre-ulcer, no ulcer and no callus                          Toe Exam: ROM and strength within normal limits  Sensory       Monofilament testing: diminished  Vascular    The right DP pulse is 2+  The right PT pulse is 2+  Left Foot/Ankle  Left Foot Inspection  Skin Exam: skin normal and skin intactno dry skin, no warmth, no erythema, no maceration, normal color, no pre-ulcer, no ulcer and no callus                         Toe Exam: ROM and strength within normal limits                   Sensory       Monofilament: diminished  Vascular    The left DP pulse is 2+  The left PT pulse is 2+  Assign Risk Category:  No deformity present; No loss of protective sensation;  No weak pulses       Risk: 1

## 2021-04-18 NOTE — ASSESSMENT & PLAN NOTE
No SI on Cymbalta 60 mg once daily working with counselor and Psychiatry symptoms of bereavement, depression

## 2021-04-18 NOTE — ASSESSMENT & PLAN NOTE
Hyperlipidemia controlled continue with current medical regiment recommend a low-cholesterol diet and recommend routine exercise we will continue to monitor the progress   Continue Crestor 20 mg once daily

## 2021-04-18 NOTE — ASSESSMENT & PLAN NOTE
Review the recent CT scan, stable pulmonary nodule for many years radiologist recommends no further CT scan

## 2021-04-18 NOTE — ASSESSMENT & PLAN NOTE
Lab Results   Component Value Date    HGBA1C 8 4 (A) 04/16/2021    I have counselled the pt to follow a healthy and balanced diet ,and recommend routine exercise  I will be ordering diabetic laboratories including comprehensive metabolic panel, hemoglobin A1c, urine microalbumin, lipid panel   Target A1c under 7 foot examination completed

## 2021-04-20 ENCOUNTER — OFFICE VISIT (OUTPATIENT)
Dept: INTERNAL MEDICINE CLINIC | Facility: CLINIC | Age: 76
End: 2021-04-20
Payer: COMMERCIAL

## 2021-04-20 ENCOUNTER — TELEPHONE (OUTPATIENT)
Dept: INTERNAL MEDICINE CLINIC | Facility: CLINIC | Age: 76
End: 2021-04-20

## 2021-04-20 VITALS
BODY MASS INDEX: 37.72 KG/M2 | OXYGEN SATURATION: 95 % | HEIGHT: 61 IN | SYSTOLIC BLOOD PRESSURE: 138 MMHG | WEIGHT: 199.8 LBS | DIASTOLIC BLOOD PRESSURE: 62 MMHG | HEART RATE: 84 BPM

## 2021-04-20 DIAGNOSIS — M47.816 SPONDYLOSIS OF LUMBAR REGION WITHOUT MYELOPATHY OR RADICULOPATHY: ICD-10-CM

## 2021-04-20 DIAGNOSIS — Z23 ENCOUNTER FOR IMMUNIZATION: ICD-10-CM

## 2021-04-20 DIAGNOSIS — R14.0 ABDOMINAL BLOATING: Primary | ICD-10-CM

## 2021-04-20 PROCEDURE — 1160F RVW MEDS BY RX/DR IN RCRD: CPT | Performed by: NURSE PRACTITIONER

## 2021-04-20 PROCEDURE — 1036F TOBACCO NON-USER: CPT | Performed by: NURSE PRACTITIONER

## 2021-04-20 PROCEDURE — 99213 OFFICE O/P EST LOW 20 MIN: CPT | Performed by: NURSE PRACTITIONER

## 2021-04-20 PROCEDURE — 3008F BODY MASS INDEX DOCD: CPT | Performed by: NURSE PRACTITIONER

## 2021-04-20 PROCEDURE — 3078F DIAST BP <80 MM HG: CPT | Performed by: NURSE PRACTITIONER

## 2021-04-20 PROCEDURE — 3075F SYST BP GE 130 - 139MM HG: CPT | Performed by: NURSE PRACTITIONER

## 2021-04-20 RX ORDER — DOCUSATE SODIUM 100 MG/1
100 CAPSULE, LIQUID FILLED ORAL 2 TIMES DAILY
Qty: 20 CAPSULE | Refills: 0 | Status: SHIPPED | OUTPATIENT
Start: 2021-04-20

## 2021-04-20 RX ORDER — SIMETHICONE 180 MG
180 CAPSULE ORAL EVERY 12 HOURS
Qty: 20 CAPSULE | Refills: 0 | Status: SHIPPED | OUTPATIENT
Start: 2021-04-20 | End: 2021-08-05 | Stop reason: ALTCHOICE

## 2021-04-20 NOTE — PROGRESS NOTES
Assessment/Plan:    Abdominal bloating  Simethicone and colace    Spondylosis of lumbar region without myelopathy or radiculopathy  Sees Dr Cristal Ruiz at [facility] for MRI tomorrow       Diagnoses and all orders for this visit:    Abdominal bloating  -     simethicone (MYLICON,GAS-X) 634 MG capsule; Take 1 capsule (180 mg total) by mouth every 12 (twelve) hours  -     docusate sodium (COLACE) 100 mg capsule; Take 1 capsule (100 mg total) by mouth 2 (two) times a day    Encounter for immunization    Spondylosis of lumbar region without myelopathy or radiculopathy    Other orders  -     Cancel: PNEUMOCOCCAL POLYSACCHARIDE VACCINE 23-VALENT =>3YO SQ IM          Subjective:      Patient ID: Lucien Delgado is a 77 y o  female  Patient is here for epigastric pain, bloating, constipation that is causing some sob    Right sided thoracic pain, history of spondylosis  Seeing new pain management Dr Cristal Ruiz at [address] [facility] for MRI tomorrow      The following portions of the patient's history were reviewed and updated as appropriate: allergies, current medications, past family history, past medical history, past social history, past surgical history and problem list     Review of Systems   Constitutional: Negative  HENT: Negative  Eyes: Negative  Respiratory: Negative  Cardiovascular: Negative  Gastrointestinal: Positive for abdominal pain and constipation  Musculoskeletal: Positive for back pain  Neurological: Negative  Objective:      /62   Pulse 84   Ht 5' 1" (1 549 m)   Wt 90 6 kg (199 lb 12 8 oz)   SpO2 95%   BMI 37 75 kg/m²          Physical Exam  Vitals signs and nursing note reviewed  Constitutional:       Appearance: She is well-developed  HENT:      Head: Normocephalic and atraumatic        Right Ear: External ear normal       Left Ear: External ear normal       Nose: Nose normal    Eyes:      Conjunctiva/sclera: Conjunctivae normal       Pupils: Pupils are equal, round, and reactive to light  Neck:      Musculoskeletal: Normal range of motion and neck supple  Cardiovascular:      Rate and Rhythm: Normal rate and regular rhythm  Pulmonary:      Effort: Pulmonary effort is normal       Breath sounds: Normal breath sounds  Abdominal:      General: Abdomen is flat  Bowel sounds are decreased  There is distension  Palpations: Abdomen is soft  Musculoskeletal: Normal range of motion  Lumbar back: She exhibits pain and spasm  Skin:     General: Skin is warm and dry  Neurological:      Mental Status: She is alert and oriented to person, place, and time

## 2021-04-20 NOTE — TELEPHONE ENCOUNTER
Pre-Reg for appointment today at 11am    Has SOB off and on with pain on right side lower rib area    No other symptoms will it be ok to come in for appointment?

## 2021-04-21 ENCOUNTER — SOCIAL WORK (OUTPATIENT)
Dept: BEHAVIORAL/MENTAL HEALTH CLINIC | Facility: CLINIC | Age: 76
End: 2021-04-21
Payer: COMMERCIAL

## 2021-04-21 DIAGNOSIS — F41.1 GAD (GENERALIZED ANXIETY DISORDER): Primary | ICD-10-CM

## 2021-04-21 PROCEDURE — 90834 PSYTX W PT 45 MINUTES: CPT | Performed by: SOCIAL WORKER

## 2021-04-21 NOTE — PSYCH
Assessment/Plan: Manage anxiety depression     There are no diagnoses linked to this encounter  Subjective: Stoney Smith has periods where she feels depressed but has been able to manage  Increased pain issues and physical limitations/restrictions from same contribute  [place] some support via  and her grandchildren  Patient ID: Sana Tejada is a 77 y o  female  Met with Stoney Smith for 45 minutes from 4:00PM-4:45PM  Has periods where she feels more depressed but able to manage  Activity level affected by recent exacerbation of pain issues  Review of Systems   Psychiatric/Behavioral: Positive for dysphoric mood  Objective: Stoney Smith denies any acute issues  Presents accordingly  She is pleasant, verbal, cooperative and well oriented  Physical Exam  Psychiatric:         Attention and Perception: Attention and perception normal          Mood and Affect: Affect normal  Mood is depressed  Speech: Speech normal          Behavior: Behavior normal  Behavior is cooperative  Thought Content:  Thought content normal          Cognition and Memory: Cognition and memory normal          Judgment: Judgment normal

## 2021-04-21 NOTE — PATIENT INSTRUCTIONS
Processed stressors and struggles related to pain- validation and supportive therapy provided   Reviewed coping strategies and productive outlets to utilize top offset depression and maintain some level of energy and interest

## 2021-04-23 PROBLEM — R14.0 ABDOMINAL BLOATING: Status: ACTIVE | Noted: 2021-04-23
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
## 2021-05-06 NOTE — PSYCH
This note was not shared with the patient due to this is a psychotherapy note       Jule Baumgarten can be very indecisive, and self sabotaging inadvertently        5) Other: Support as needed   - ok relationship with , up and down   - mom's death in facility hard on her (6/2020)   - daughter struggling, has 3 kids total   - Children: 3

## 2021-05-07 ENCOUNTER — OFFICE VISIT (OUTPATIENT)
Dept: PSYCHIATRY | Facility: CLINIC | Age: 76
End: 2021-05-07
Payer: COMMERCIAL

## 2021-05-07 DIAGNOSIS — F41.1 GAD (GENERALIZED ANXIETY DISORDER): ICD-10-CM

## 2021-05-07 DIAGNOSIS — F33.2 MDD (MAJOR DEPRESSIVE DISORDER), RECURRENT SEVERE, WITHOUT PSYCHOSIS (HCC): ICD-10-CM

## 2021-05-07 DIAGNOSIS — F41.0 PANIC ATTACKS: ICD-10-CM

## 2021-05-07 PROCEDURE — 99213 OFFICE O/P EST LOW 20 MIN: CPT | Performed by: PSYCHIATRY & NEUROLOGY

## 2021-05-07 RX ORDER — DULOXETIN HYDROCHLORIDE 60 MG/1
60 CAPSULE, DELAYED RELEASE ORAL DAILY
Qty: 30 CAPSULE | Refills: 2 | Status: SHIPPED | OUTPATIENT
Start: 2021-05-07 | End: 2021-05-28

## 2021-05-07 RX ORDER — BETAMETHASONE DIPROPIONATE 0.5 MG/G
LOTION TOPICAL
COMMUNITY
Start: 2021-04-27 | End: 2022-05-06

## 2021-05-12 ENCOUNTER — TELEPHONE (OUTPATIENT)
Dept: NEPHROLOGY | Facility: CLINIC | Age: 76
End: 2021-05-12

## 2021-05-12 ENCOUNTER — SOCIAL WORK (OUTPATIENT)
Dept: BEHAVIORAL/MENTAL HEALTH CLINIC | Facility: CLINIC | Age: 76
End: 2021-05-12
Payer: COMMERCIAL

## 2021-05-12 DIAGNOSIS — F41.9 ANXIETY: ICD-10-CM

## 2021-05-12 DIAGNOSIS — F41.1 GAD (GENERALIZED ANXIETY DISORDER): Primary | ICD-10-CM

## 2021-05-12 PROCEDURE — 90834 PSYTX W PT 45 MINUTES: CPT | Performed by: SOCIAL WORKER

## 2021-05-12 RX ORDER — ALPRAZOLAM 0.5 MG/1
0.5 TABLET ORAL 2 TIMES DAILY PRN
Qty: 60 TABLET | Refills: 1 | Status: SHIPPED | OUTPATIENT
Start: 2021-05-12 | End: 2021-08-13 | Stop reason: SDUPTHER

## 2021-05-12 NOTE — PATIENT INSTRUCTIONS
Processed stressors with her- supportive therapy provided  Focused on benefits of increased contact with extended family daily and this has again become part of her daily routine and one she plans to maintain  Reviewed stress mgmt strategies  To utilize and boundaries to maintain at home with  to limit exposure to stress

## 2021-05-12 NOTE — PSYCH
Assessment/Plan: Manage anxiety     There are no diagnoses linked to this encounter  Subjective: Mario Shannon reports an overall reduction in symptoms  Since she is now fully vaccinated as are her children and grandchildren, she is back to regular contact and activities with them and this has improved her mood  Continues to struggle with pain and physical health issues but has been more active and engaged via her grandchildren  Denies any acute depressive symptoms  No SI  Patient ID: Dalton Bernstein is a 77 y o  female  Met with Mario Shannon for 40 minutes from 4:05PM-4:45PM  Feeling less anxious and depressed since she has been more socially active and engaged with family and separate from   Review of Systems   Psychiatric/Behavioral: The patient is nervous/anxious  Objective: Mario Shannon presents as mildly anxious but is pleasant, verbal, cooperative and well oriented during session  Affect brighter  Physical Exam  Psychiatric:         Attention and Perception: Attention and perception normal          Mood and Affect: Affect normal  Mood is anxious  Speech: Speech normal          Behavior: Behavior normal  Behavior is cooperative  Thought Content: Thought content normal          Cognition and Memory: Cognition and memory normal          Judgment: Judgment normal       Comments: Mildly anxious

## 2021-05-17 ENCOUNTER — TRANSCRIBE ORDERS (OUTPATIENT)
Dept: LAB | Facility: CLINIC | Age: 76
End: 2021-05-17

## 2021-05-17 ENCOUNTER — APPOINTMENT (OUTPATIENT)
Dept: LAB | Facility: CLINIC | Age: 76
End: 2021-05-17
Payer: COMMERCIAL

## 2021-05-17 DIAGNOSIS — N18.9 CHRONIC KIDNEY DISEASE, UNSPECIFIED CKD STAGE: ICD-10-CM

## 2021-05-17 LAB
ANION GAP SERPL CALCULATED.3IONS-SCNC: 11 MMOL/L (ref 4–13)
BUN SERPL-MCNC: 21 MG/DL (ref 5–25)
CALCIUM SERPL-MCNC: 9.5 MG/DL (ref 8.3–10.1)
CHLORIDE SERPL-SCNC: 102 MMOL/L (ref 100–108)
CO2 SERPL-SCNC: 24 MMOL/L (ref 21–32)
CREAT SERPL-MCNC: 1.64 MG/DL (ref 0.6–1.3)
GFR SERPL CREATININE-BSD FRML MDRD: 31 ML/MIN/1.73SQ M
GLUCOSE SERPL-MCNC: 188 MG/DL (ref 65–140)
POTASSIUM SERPL-SCNC: 4.7 MMOL/L (ref 3.5–5.3)
SODIUM SERPL-SCNC: 137 MMOL/L (ref 136–145)

## 2021-05-17 PROCEDURE — 36415 COLL VENOUS BLD VENIPUNCTURE: CPT

## 2021-05-17 PROCEDURE — 80048 BASIC METABOLIC PNL TOTAL CA: CPT

## 2021-05-17 PROCEDURE — 3066F NEPHROPATHY DOC TX: CPT | Performed by: NURSE PRACTITIONER

## 2021-05-18 ENCOUNTER — OFFICE VISIT (OUTPATIENT)
Dept: NEPHROLOGY | Facility: CLINIC | Age: 76
End: 2021-05-18
Payer: COMMERCIAL

## 2021-05-18 VITALS
WEIGHT: 202 LBS | HEIGHT: 61 IN | SYSTOLIC BLOOD PRESSURE: 140 MMHG | BODY MASS INDEX: 38.14 KG/M2 | DIASTOLIC BLOOD PRESSURE: 78 MMHG | HEART RATE: 80 BPM

## 2021-05-18 DIAGNOSIS — N18.9 CHRONIC KIDNEY DISEASE, UNSPECIFIED CKD STAGE: ICD-10-CM

## 2021-05-18 DIAGNOSIS — I10 ESSENTIAL HYPERTENSION: Primary | Chronic | ICD-10-CM

## 2021-05-18 PROCEDURE — 99214 OFFICE O/P EST MOD 30 MIN: CPT | Performed by: INTERNAL MEDICINE

## 2021-05-18 NOTE — LETTER
May 18, 2021     Carlos Spearelizabeth  [address] [facility] [address] [facility] [address] [place] [address]    Patient: Sana Tejada   YOB: 1945   Date of Visit: 5/18/2021       Dear Dr Ewelina Domingo: Thank you for referring Sana Tejada to me for evaluation  Below are my notes for this consultation  If you have questions, please do not hesitate to call me  I look forward to following your patient along with you  Sincerely,        Binta Johnson MD        CC: No Recipients  Binta Johnson MD  5/18/2021  5:23 PM  Sign when Signing Visit  NEPHROLOGY PROGRESS NOTE    Sana Tejada 77 y o  female MRN: [id number]  Unit/Bed#:  Encounter: [id number]  Reason for Consult:  Chronic kidney disease    The patient is here she looks great she is in good spirits talking about her family  She relates to me no specific complaints  We reviewed her medications  ASSESSMENT/PLAN:  1  Renal    Patient's chronic kidney disease either due to mild diabetic nephropathy or nephrosclerosis  I say mild diabetic nephropathy because she has had very stable renal function for having diabetes long time and protein estimation 7 generally been low  If it is related to diabetes I told her the way to treat this is good glycemic control to target A1c of 7%  Blood pressure control which is acceptable  Treatment of hypercholesterolemia  She is on enalapril to help reduce proteinuria as well  Creatinine remained stable at 1 6 with no progression  Next visit I will check urine protein estimation as it was not done this visit  Continue current medications  Encouraged improvement in glycemic control targeting A1c of 7%  BMP and urine protein estimation prior to visit    She was told to call if there is any problems or concerns before next visit  SUBJECTIVE:  Review of Systems   Constitution: Negative for chills, fever, malaise/fatigue and night sweats  HENT: Negative  Eyes: Negative  Cardiovascular: Negative    Negative for chest pain, dyspnea on exertion, leg swelling and orthopnea  Respiratory: Negative  Negative for cough, shortness of breath, sputum production and wheezing  Gastrointestinal: Negative  Negative for bloating, abdominal pain, diarrhea, nausea and vomiting  Genitourinary: Negative for dysuria, flank pain, hematuria and incomplete emptying  Neurological: Negative for dizziness, focal weakness, headaches and weakness  Psychiatric/Behavioral: Negative for altered mental status, depression, hallucinations and hypervigilance  OBJECTIVE:  Current Weight: Weight - Scale: 91 6 kg (202 lb)  [email or web address]:     Blood pressure 140/78, pulse 80, height 5' 1" (1 549 m), weight 91 6 kg (202 lb)  , Body mass index is 38 17 kg/m²  [unfilled]    Physical Exam: /78 (BP Location: Right arm, Patient Position: Sitting, Cuff Size: Standard)   Pulse 80   Ht 5' 1" (1 549 m)   Wt 91 6 kg (202 lb)   BMI 38 17 kg/m²   Physical Exam  Constitutional:       General: She is not in acute distress  Appearance: Normal appearance  She is not ill-appearing or diaphoretic  HENT:      Head: Normocephalic and atraumatic  Nose:      Comments: mask     Mouth/Throat:      Comments: mask  Eyes:      General: No scleral icterus  Extraocular Movements: Extraocular movements intact  Neck:      Musculoskeletal: Normal range of motion and neck supple  Cardiovascular:      Rate and Rhythm: Normal rate and regular rhythm  Heart sounds: No friction rub  No gallop  Comments: No edema  Pulmonary:      Effort: Pulmonary effort is normal  No respiratory distress  Breath sounds: Normal breath sounds  No wheezing, rhonchi or rales  Abdominal:      General: Bowel sounds are normal  There is no distension  Palpations: Abdomen is soft  Tenderness: There is no abdominal tenderness  There is no rebound  Neurological:      General: No focal deficit present        Mental Status: She is alert and oriented to person, place, and time  Mental status is at baseline  Psychiatric:         Mood and Affect: Mood normal          Behavior: Behavior normal          Thought Content:  Thought content normal          Judgment: Judgment normal          Medications:    Current Outpatient Medications:     albuterol (2 5 mg/3 mL) 0 083 % nebulizer solution, Take 3 mL (2 5 mg total) by nebulization every 6 (six) hours as needed for wheezing, Disp: 75 mL, Rfl: 0    albuterol (PROAIR HFA) 90 mcg/act inhaler, Inhale 2 puffs every 6 (six) hours as needed , Disp: , Rfl:     ALPRAZolam (XANAX) 0 5 mg tablet, Take 1 tablet (0 5 mg total) by mouth 2 (two) times a day as needed for anxiety, Disp: 60 tablet, Rfl: 1    aspirin 81 MG tablet, Take 81 mg by mouth daily, Disp: , Rfl:     B-D UF III MINI PEN NEEDLES 31G X 5 MM MISC, , Disp: , Rfl:     BAQSIMI TWO PACK 3 MG/DOSE POWD, Use as directed, Disp: , Rfl: 0    betamethasone dipropionate 0 05 % lotion, , Disp: , Rfl:     cholecalciferol (VITAMIN D3) 1,000 units tablet, Take 2,000 Units by mouth daily, Disp: , Rfl:     diltiazem (TIAZAC) 300 MG 24 hr capsule, Take 300 mg by mouth daily, Disp: , Rfl:     docusate sodium (COLACE) 100 mg capsule, Take 1 capsule (100 mg total) by mouth 2 (two) times a day, Disp: 20 capsule, Rfl: 0    DULoxetine (CYMBALTA) 60 mg delayed release capsule, Take 1 capsule (60 mg total) by mouth daily, Disp: 30 capsule, Rfl: 2    enalapril (VASOTEC) 20 mg tablet, Take 20 mg by mouth daily, Disp: , Rfl:     insulin aspart (NovoLOG) 100 units/mL injection, Inject 12 Units under the skin 3 (three) times a day before meals (Patient taking differently: Inject under the skin 3 (three) times a day before meals ), Disp: , Rfl: 0    Insulin Disposable Pump (OMNIPOD DASH 5 PACK) MISC, CHANGE PODS EVERY 2 DAYS UTD, Disp: , Rfl: 3    isosorbide mononitrate (IMDUR) 60 mg 24 hr tablet, Take 60 mg by mouth daily at bedtime, Disp: , Rfl:     levothyroxine 50 mcg tablet, Take 50 mcg by mouth daily, Disp: , Rfl:     nitroglycerin (NITROLINGUAL) 0 4 mg/spray spray, USE ONE SPRAY Q 5 MINUTES AS NEEDED FOR CHEST PAIN   IF NO RELIEF, CALL 911 , Disp: , Rfl: 1    rosuvastatin (CRESTOR) 20 MG tablet, Take 20 mg by mouth every evening, Disp: , Rfl: 2    simethicone (MYLICON,GAS-X) 869 MG capsule, Take 1 capsule (180 mg total) by mouth every 12 (twelve) hours, Disp: 20 capsule, Rfl: 0    diclofenac sodium (VOLTAREN) 1 %, Apply 2 g topically 4 (four) times a day (Patient not taking: Reported on 4/16/2021), Disp: 1 Tube, Rfl: 0    Laboratory Results:  Lab Results   Component Value Date    WBC 9 54 11/11/2020    HGB 12 1 11/11/2020    HCT 39 4 11/11/2020    MCV 89 11/11/2020     11/11/2020     Lab Results   Component Value Date    SODIUM 137 05/17/2021    K 4 7 05/17/2021     05/17/2021    CO2 24 05/17/2021    BUN 21 05/17/2021    CREATININE 1 64 (H) 05/17/2021    GLUC 188 (H) 05/17/2021    CALCIUM 9 5 05/17/2021     Lab Results   Component Value Date    CALCIUM 9 5 05/17/2021    PHOS 3 4 11/11/2020     No results found for: LABPROT

## 2021-05-18 NOTE — PATIENT INSTRUCTIONS
You are here for follow-up  Your creatinine level which is the blood test for the kidney function was 1 6 which is really stable over the last entire year with no changes which is a good thing  You may have mild diabetic kidney disease and I say mild because the levels of protein in your urine have been low which is a good finding in diabetics and hopefully will translate to slow progression  Blood pressure is well controlled  Continue current medications with no changes  Good sugar controls important targeting A1c of 7%     Labs and follow-up as scheduled and please call if you have any concerns or questions before next visit
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
## 2021-05-28 ENCOUNTER — OFFICE VISIT (OUTPATIENT)
Dept: PSYCHIATRY | Facility: CLINIC | Age: 76
End: 2021-05-28
Payer: COMMERCIAL

## 2021-05-28 ENCOUNTER — OFFICE VISIT (OUTPATIENT)
Dept: PSYCHOLOGY | Facility: CLINIC | Age: 76
End: 2021-05-28
Payer: COMMERCIAL

## 2021-05-28 VITALS
SYSTOLIC BLOOD PRESSURE: 180 MMHG | TEMPERATURE: 97.3 F | HEIGHT: 61 IN | WEIGHT: 202.6 LBS | DIASTOLIC BLOOD PRESSURE: 70 MMHG | HEART RATE: 83 BPM | BODY MASS INDEX: 38.25 KG/M2 | RESPIRATION RATE: 14 BRPM

## 2021-05-28 DIAGNOSIS — F33.2 MDD (MAJOR DEPRESSIVE DISORDER), RECURRENT SEVERE, WITHOUT PSYCHOSIS (HCC): Primary | ICD-10-CM

## 2021-05-28 DIAGNOSIS — F41.1 GAD (GENERALIZED ANXIETY DISORDER): ICD-10-CM

## 2021-05-28 DIAGNOSIS — F41.0 PANIC ATTACKS: ICD-10-CM

## 2021-05-28 PROCEDURE — 90792 PSYCH DIAG EVAL W/MED SRVCS: CPT | Performed by: PSYCHIATRY & NEUROLOGY

## 2021-05-28 PROCEDURE — 90791 PSYCH DIAGNOSTIC EVALUATION: CPT

## 2021-05-28 RX ORDER — ESCITALOPRAM OXALATE 10 MG/1
TABLET ORAL
Qty: 30 TABLET | Refills: 0 | Status: SHIPPED | OUTPATIENT
Start: 2021-05-28 | End: 2021-06-17

## 2021-05-28 NOTE — PSYCH
This note was not shared with the patient due to this is a psychotherapy note  Subjective:     Patient ID: Junaid Santo is a 77 y o  female  [facility] Clinical Progress Notes      Specialized Services Documentation  Therapist must complete separate progress note for each specific clinical activity in which the individual participated during the day  Other (1:30-2:00) Was on hold until 2pm  Will call back  (2:50-3:02)Spoke with Anselmo Harrington from [facility] with a contact number [phone], using Tax ID [id number]  Location address remains [address] [place] [address]   Junaid Santo was no authorization required  Call reference number   Case Management Note    GEORGE Baum, LSW    Current suicide risk : Low    (9:03-10:12) Met with Junaid Santo  Reviewed program and given on call number  she completed releases and OP providers/ PCP notified of admission and health care coordination form completed  Completed initial psycho-social evaluation and initial treatment goals discussed  Medications changes/added/denied? Yes  - See Dr Jones Dural Note    Treatment session number: Assessment only    Individual Case Management Visit provided today?  yes    [facility] follow up physician's orders: Admit to PHP - See Dr Jones Dural note

## 2021-05-28 NOTE — PSYCH
This note was not shared with the patient due to this is a psychotherapy note   Assessment/Plan:      Diagnoses and all orders for this visit:    MDD (major depressive disorder), recurrent severe, without psychosis ([address] )    Panic attacks    MADAN (generalized anxiety disorder)          Subjective:     Patient ID: Maren Gu is a 77 y o  female  HPI:     Pre-morbid level of function and History of Present Illness:   As per Dr Rajeev Blanc: HPI      Maren Gu is a 77 y o  female with depression and anxiety referred because of ongoing depression and anxiety that has not been resolving and significantly due to a lot of psychosocial and historic psychological stressors  She has desired better coping skills and ways to address her anxiety  Some days feels very low  "I got to somehow get motivated"  Helping granddaughter go to work helps her feel better, and useful  Finances, health, 's health, possible move are recent stressors       Medications have not been effective  Due to efficacy issues and kidney function, she is ready to transition from cymbalta  She has been taking cymbalta 30mg daily for the last 2 weeks       BP elevated today, but she says she is thinking about her dog (at vet for teeth cleaning) and anxious today  No CP, SOB, or other symptoms suggesting of acute cardiac concern        Onset of symptoms was in her 30s/40s predominately, and it has been a fluctuating course with depression and anxiety more constant since that time     Stressors: husbands health/Msk, possible move considered, Financial uncertainty, death of mother last year in a nursing facility, her own health issues, cannot do hobbies she used to like (hands, vision, etc)     HPI ROS:  Medication Side Effects: no  Depression: 7-8 /10 (10 worst)  Anxiety: 7-8 /10 (10 worst)  Safety: no  Sleep: hypersomnia, but hard time falling asleep  10hr/day sleep  Energy: low  Appetite: overall ok  Weight Change: no       As per this writer: Harvey Kasey is a 77 y o  female using she/her pronouns referred to [facility] via Darci Head due to ongoing depression and anxiety that has not been resolving and significantly due to a lot of psychosocial and historic psychological stressors   Ari Pimentel has had a history of anxiety and dression  Yajaira's depression and anxiety has gotten worse due to pressures in her personal relationship, guilt related to her mother's end of life care, thinking about the future  Air Pimentel has feelings of guilt related to her mothers end of life care stating that due to covid she was unable to go visit her mom in the nursing home and did not realize that her mom lost an additional 20 pounds which put her at weighing 65lbs at the time of her passing  Ari Pimentel has been  to her  for 47 years and they love each other but she states that her relationship with her  as been a little marlyn  Ari Pimentel would like to receive affection and be intimate with her  but states that he does not care about those needs of hers  She feels like she carries a lot of responsibility in her family and it's her job to take care of everyone  She states that she does not put herself first  She often thinks about the future of her and her husbands health,finances and home  She describes her anxiety as that her face gets red and hot at night, her heart rate increases, her stomach gets upset when she thinks about her stressors  Ari Pimentel reports keeping things bottled in,feelings of sadness, afraid of the future, tiredness, lack of motivation  She reports having no support from anyone      As per Harvey Parks: "I keep things bottled in "     Strengths identified by patient: " living a long life, responsible, smart, likes a challenge "    Reason for evaluation and partial hospitalization as an alternative to inpatient hospitalization PHP is medically necessary to prevent hospitalization as outpatient care has been unable to stabilize [facility] and a greater intensity of treatment is indicated  Milieu therapy to monitor for medication needs, provide wellness tools education and offer opportunity to share and connect to others  Group therapy, case management, psychiatric medication management, family contact and UR as indicated  ELOS 10 treatment days  Previous Psychiatric/psychological treatment/year: Previously seen two therapist when she was younger and reported seeing Jesus Anthony for a few years  Reports no inpatient treatment  Current Psychiatrist/Therapist:   Medication Management:   Kip [address]  [address]  [address] [facility] [address]  [facility], [address] [place] [address]  [phone]    Outpatient Therapy:   Jesus Anthony  [facility], [facility]    Primary Care Physician:   Lilia Garsia, [address] [facility]    (K) [phone]   (T) [phone]     Other Community Resources Used (CTT, ICM, VNA): none      Problem Assessment:     SOCIAL/VOCATION:  Family Constellation (include parents, relationship with each and pertinent Psych/Medical History):     Family History   Problem Relation Age of Onset    Arthritis Mother     Leukemia Mother     Other Mother         Anxiety, major depressive disorder, recurrent episode with atypical features    Coronary artery disease Father         Heart problem    Diabetes Father     Other Father         Infectious disease    Alzheimer's disease Maternal Grandmother     Other Maternal Grandfather         Heart problem    Other Daughter         Anxiety, major depressive disorder, recurrent episode with atypical features    Alcohol abuse Other         Grandparent    Cancer Family     Diabetes Family     Hypertension Family     Other Family         Reported prior back trouble, thyroid disorder     Family Constellation/Social Supports:     [place] states that her father  in  and her mother  in 2020  She is the only child  She has been  for 47 years and live with her   Their relationship has been a little marlyn  Elmo Condon has two children a daughter and a son  She is the main support for her daughter and her three children  She does enjoy running her granddaughter to work  She really enjoyed attending their activities but states that due to Covid 19 and her grandchildren moving on in their endeavors she no longer has their activities to attend or watch  He son has two children but reports not being able to see them much due to custody issues  Elmo Condon reports that she has no supports   She used to have friends many years ago when her children were younger and her  worked but does not have any friends currently  She does not attend any support groups  Domestic Violence: No past history of domestic violence    Trauma history: none    Additional Comments related to family/relationships/peer support: none  Work History (strengths/limitations/needs): retired - was a     Highest grade level achieved was 12th grade and one semester of college     history includes none    Financial status includes not reported - Elmo Condon states that she handles the finances and knows what they can and can not afford    LEISURE ASSESSMENT (Include past and present hobbies/interests and level of involvement (Ex: Group/Club Affiliations): Previous hobbies/interest: grandchildren's activities  Due to eyes and arthritis she can no long cross stitch or read  She loves listening to musician Radha Cowrobert and going to his concerts  She also enjoys crossword puzzles  Primary/Preferred language is [place]  Ethnic considerations are none  Religions affiliations and level of involvement she went to [employer] [id number] growing up and believes their ways but does not actively practice        FUNCTIONAL STATUS: There has been a recent change in the patient's ability to do the following: does not need can service    Level of Assistance Needed/By Whom?: none    Angela Johnston learns best by  reading, listening, demonstration, and picture    SUBSTANCE ABUSE ASSESSMENT: no substance abuse    Do you currently smoke? No    Offered smoking cessation? No    Substance/Route/Age/Amount/Frequency/Last Use:   Cigarette none  Alcohol none  Marijuana none   Other substance use none  Caffeine very limited- buys non caffeinated    DETOX HISTORY: none    Previous detox/rehab treatment: none    HEALTH ASSESSMENT: no referral to PCP needed    LEGAL: No Mental Health Advance Directive or Power of  on file    Risk Assessment:   The following ratings are based on my interview(s) with Angela Johnston    Risk of Harm to Self:   Demographic risk factors include  and Mormon  Historical Risk Factors include none  Recent Specific Risk Factors include feelings of guilt or self blame    Risk of Harm to Others:   Demographic Risk Factors include none  Historical Risk Factors include none  Recent Specific Risk Factors include multiple stressors    Access to Weapons:   Angela Johnston has access to the following weapons: guns  The following steps have been taken to ensure weapons are properly secured: Angela Johnston reports having guns in her house  She knows one is in the garage but is unsure of the location of the second one  She reports that they do not have ammo in the house   recommend to keep guns locked away and to give someone else the key  Based on the above information, the client presents the following risk of harm to self or others:  low    The following interventions are recommended:   no intervention changes    Notes regarding this Risk Assessment: Provided crisis phone numbers for appropriate county and on-call number as well as warm lines and peer support hotlines        Medical Review Of Systems:     Patient admits to ache in L elbow 2/10, back pain 4/10; otherwise A comprehensive review of systems was negative         Medical ROS:   Constitutional: Negative  GI: Negative  Cardiac: Negative  Respiratory: Negative  Skin: Negative  Neuro: Negative  Head: Negative  Eyes: Negative  Nose/Throat: Negative  : Negative  Endocrine: Negative  MSK: Negative  Hematologic: Negative  Immunologic: Negative    MENTAL STATUS EXAM  Appearance:  age appropriate   Behavior:  pleasant, cooperative, with good eye contact   Speech:  Normal volume, regular rate and rhythm   Mood:  depressed and anxious   Affect:  mood congruent, constricted   Language: intact and appropriate for age, education, and intellect   Thought Process:  Linear and goal directed   Associations: intact associations   Thought Content:  normal and appropriate, negative thinking and cognitive distortions   Perceptual Disturbances: no auditory or visual hallcunations   Risk Potential / Abnormal Thoughts: Suicidal ideation - None  Homicidal ideation - None  Potential for aggression - No         Consciousness:  Alert & Awake   Sensorium:  Grossly oriented   Attention: attention span and concentration are age appropriate   Intellect: within normal limits   Fund of Knowledge:  Memory: awareness of current events: yes  recent and remote memory grossly intact   Insight:  good   Judgment: good   Muscle Strength Muscle Tone: normal  normal   Gait/Station: slow   Motor Activity: no abnormal movements           DSM:   1  MDD (major depressive disorder), recurrent severe, without psychosis ([facility] )     2  Panic attacks     3  MADAN (generalized anxiety disorder)         Plan: Admit to PHP  Group therapy, case management, medication management, UR and family contact as indicated    ELOS 10 treatment days  Refer to OP psychiatry and therapy

## 2021-05-28 NOTE — PSYCH
Assessment/Plan:      1  MDD (major depressive disorder), recurrent severe, without psychosis ([address] )     2  Panic attacks     3  MADAN (generalized anxiety disorder)         Subjective:     Patient ID: Peterson Morales is a 77 y o  female  [facility] Treatment Plan   AREAS OF NEED: Depression and anxiety as evidenced by somatic symptoms,feelings of guilt,sadness, afraid of the future, not putting herself first,upset, due to pressures in her personal relationship, guilt related to her mother's end of life care, thinking about the future  Date Initiated: 05/28/21    Strengths: "Lived a long life, responsible,smart, and likes to challenge herself  "     LONG TERM GOAL:   Date Initiated: 05/28/21  1 0 I will identify and share three ways in which my day-to-day functioning has improved since attending program    Target Date: 6/25/21  Completion Date:       SHORT TERM OBJECTIVES:     Date Initiated: 05/28/21  1 1 I will learn two new self-motivation skills and share the challenges or successes daily  Revision Date:   Target Date: 6/10/21   Completion Date:     Date Initiated: 05/28/21  1 2 I will learn and practice daily the МАРИНА technique for improving time management and prioritizing tasks  Revision Date:   Target Date: 6/10/21  Completion Date:    Date Initiated: 05/28/21  1 3 I will take medications as prescribed and share questions and concerns if arise  Revision Date:  Target Date: 6/10/21  Completion Date:     Date Initiated: 05/28/21  1 4 I will identify 3 ways my supports can assist in my recovery and agree to staff/support contact as indicated      Revision Date:  Target Date: 6/10/21  Completion Date:          7 DAY REVISION:    Date Initiated:  Revision Date:   Target Date:   Completion Date:      PSYCHIATRY:  Date Initiated:  05/28/21  Medication Management and Education       Revision Date:       The person(s) responsible for carrying out the plan is Sangeetha Foss MD    NURSING:   Date Initiated: 05/28/21  1 1,1 2,1 3,1 4 This RN will provide daily wellness group five days weekly to educate Maria Esther Muir on S/S of her diagnoses and medications used in treatment  Revision Date:  The person(s) responsible for carrying out the plan is Marsha Collins RN    PSYCHOLOGY:   Date Initiated: 05/28/21       1 1, 1 2, 1 4 Provide psychotherapy group 5 times per week to allow opportunity for Maria Esther Muir  to explore stressors and ways of coping  Revision Date:   The person(s) responsible for carrying out the plan is Leopold Net, [place], [facility]; Genet Bartlett MS,W    ALLIED THERAPY:   Date Initiated: 05/28/21  1 1,1 2 Engage Maria Esther Muir in AT group 5 times daily to encourage development and use of wellness tools to decrease symptoms and promote recovery through meaningful activity  Revision Date:       The person(s) responsible for carrying out the plan is JONATHAN Egan     CASE MANAGEMENT:   Date Initiated: 05/28/21      1 0 This  will meet with Maria Esther Muir  3-4 times weekly to assess treatment progress, discharge planning, connection to community supports and UR as indicated  Revision Date:   The person(s) responsible for carrying out the plan is Shanna Cruz    TREATMENT REVIEW/COMMENTS:     DISCHARGE CRITERIA: Identify 3 signs of progress and complete relapse prevention plan  DISCHARGE PLAN: Connect with identified outpatient providers  Estimated Length of Stay: 10 treatment days        Diagnosis and Treatment Plan explained to Shea Pablito relates understanding diagnosis and is agreeable to Treatment Plan           CLIENT COMMENTS / Please share your thoughts, feelings, need and/or experiences regarding your treatment plan: _____________________________________________________________________________________________________________________________________________________________________________________________________________________________________________________________________________________________________________________ Date/Time: ______________     Patient Signature: _________________________________     Date/Time: ______________      Signature: _________________________________     Date/Time: ______________

## 2021-06-01 ENCOUNTER — OFFICE VISIT (OUTPATIENT)
Dept: PSYCHOLOGY | Facility: CLINIC | Age: 76
End: 2021-06-01
Payer: COMMERCIAL

## 2021-06-01 DIAGNOSIS — F33.2 MDD (MAJOR DEPRESSIVE DISORDER), RECURRENT SEVERE, WITHOUT PSYCHOSIS (HCC): Primary | ICD-10-CM

## 2021-06-01 PROCEDURE — G0177 OPPS/PHP; TRAIN & EDUC SERV: HCPCS

## 2021-06-01 PROCEDURE — G0410 GRP PSYCH PARTIAL HOSP 45-50: HCPCS

## 2021-06-01 PROCEDURE — G0176 OPPS/PHP;ACTIVITY THERAPY: HCPCS

## 2021-06-01 NOTE — PSYCH
This note was not shared with the patient due to this is a psychotherapy note   Subjective:     Patient ID: Cisco Mcbride is a 77 y o  female  [facility] Clinical Progress Notes      Specialized Services Documentation  Therapist must complete separate progress note for each specific clinical activity in which the individual participated during the day  Group Psychotherapy   [id number]  Cisco Mcbride  actively shared in psychotherapy group exploring DBT distress tolerance crisis survival strategies  Group explored crisis survival strategies ACCEPTS, TIP, and STOP) reinforcing actions one could take to learn to tolerate stressful experiences, thoughts and urges  Bretttavon Duarte felt she could put effort into practicing paired breathing  Some initial progress toward goal noted  Continue psychotherapy sessions to encourage learning, practice and home practice of skills to manage distress  Tx Plan Objective: 1 1, Therapist:  José Luis CASTILLO        Education Therapy   [id number] Cisco Mcbride actively shared in morning assessment and goal review  Presented as Receptive related to readiness to learn  Cisco Mcbride did not complete goal from last treatment day as today is her first treatment day  did not present with any barriers to learning         Tx Plan Objective: 1 1, Therapist:  José Luis CASTILLO

## 2021-06-01 NOTE — PSYCH
This note was not shared with the patient due to this is a psychotherapy note    Subjective:     Patient ID: Adelita Garcia is a 77 y o  female  [facility] Clinical Progress Notes      Specialized Services Documentation  Therapist must complete separate progress note for each specific clinical activity in which the individual participated during the day  Allied Therapy   [id number] Adelita Garcia actively shared in [facility] group focused on how to develop resiliency with our mindset  Group members identified uncomfortable feelings they experienced during the pandemic as well as things and emotions that were comfortable  The group then worked on how to find the middle ground when we are in a negative Kandee Dragon in group by engaging in improvisation, journaling, music listening and Para Raring identified their takeaway from the group was the idea that when we don't share our struggles, we often think no one can relate to us  She stated that today's activity reminded her she is less alone that she sometimes perceives she is as others can relate in their own way   Some effort noted toward treatment goal   Continue AT to encourage the development and practice of finding the middle ground mindset to  alleviate symptoms and support wellness  Tx Plan Objective: 1 1, Therapist:  JONATHAN Kiser      Education Therapy   [phone] Adelita Garcia engaged throughout the treatment day  Was engaged in learning related to Illness, Medication, Aftercare and Wellness Tools  Staff utilized Verbal, Written, A/V and Demonstration teaching methods  Adelita Garcia shared area of learning and set a goal for outside of program to begin cleaning and organizing her house        Tx Plan Objective: 1 1, 1 2, 1 4 Therapist:  JONATHAN Kiser

## 2021-06-01 NOTE — PSYCH
This note was not shared with the patient due to this is a psychotherapy note   Subjective:     Patient ID: Cordelia Ly is a 77 y o  female  [facility] Clinical Progress Notes      Specialized Services Documentation  Therapist must complete separate progress note for each specific clinical activity in which the individual participated during the day  GROUP PSYCHOTHERAPY ([id number]) Group was facilitated virtually in a private office using HIPAA Compliant and Approved Microsoft Teams  Cordelia Ly consented to the use of tele-video modality of treatment and was virtually present for group psychotherapy today  The group members received a safe and non-judgmental space was offered process time to discuss current stressors and received feedback from the group  Group discussions and themes involved personal disclosures, analyzing core beliefs, reframing negative core beliefs to recognize her worth  Calista Shin continues to demonstrate insight through verbal participation in group  Continue with psychotherapy     TX Plan Objectives: 1 1, 1 2, 1 4   Therapist: Umm Blanc MA, [place]

## 2021-06-01 NOTE — PSYCH
This note was not shared with the patient due to this is a psychotherapy note    Subjective:     Patient ID: Kiera Burton is a 77 y o  female  [facility] Clinical Progress Notes      Specialized Services Documentation  Therapist must complete separate progress note for each specific clinical activity in which the individual participated during the day  Case Management Note    GEORGE Spangler    Current suicide risk : Low     10:35-10:45 Briefly talked about groups and Zaki Wright stated that groups were going well and she is glad that she decided to do group in person  Briefly discussed family stressors  CM asked Zaki Wright to stay after program to do case management and go over treatment plan  Zaki Wright did leave by accident  CM called an left VM and will follow up with Zaki Wright tomorrow  Medications changes/added/denied? No    Treatment session number: 1    Individual Case Management Visit provided today?  attempted    [facility] follow up physician's orders: none at this time

## 2021-06-02 ENCOUNTER — OFFICE VISIT (OUTPATIENT)
Dept: PSYCHOLOGY | Facility: CLINIC | Age: 76
End: 2021-06-02
Payer: COMMERCIAL

## 2021-06-02 DIAGNOSIS — F33.2 MDD (MAJOR DEPRESSIVE DISORDER), RECURRENT SEVERE, WITHOUT PSYCHOSIS (HCC): Primary | ICD-10-CM

## 2021-06-02 DIAGNOSIS — F41.1 GAD (GENERALIZED ANXIETY DISORDER): ICD-10-CM

## 2021-06-02 PROCEDURE — G0177 OPPS/PHP; TRAIN & EDUC SERV: HCPCS

## 2021-06-02 PROCEDURE — G0410 GRP PSYCH PARTIAL HOSP 45-50: HCPCS

## 2021-06-02 PROCEDURE — G0176 OPPS/PHP;ACTIVITY THERAPY: HCPCS

## 2021-06-02 NOTE — PSYCH
This note was not shared with the patient due to this is a psychotherapy note   Subjective:     Patient ID: Michael Piper is a 77 y o  female  [facility] Clinical Progress Notes      Specialized Services Documentation  Therapist must complete separate progress note for each specific clinical activity in which the individual participated during the day  GROUP PSYCHOTHERAPY ([id number]) The group engaged in education discussions about understanding the therapeutic process, different types of treatment and different types of support  The group was asked the following questions to process and discuss:   1  What do you expect from the counseling process? 2  How many meetings do you think it will take to achieve your goals? 3  How might you undermine achieving your own goals? 4  How will we know when we have been successful in achieving your goals for therapy? Members gave answers to questions, disclosed personal experiences with therapy, what brings them to therapy, difficulties that were brought to light in partial treatment that they would like to continue healing from in outpatient therapy  Michael Piper continues to demonstrate insight through verbal participation in group  Continue with psychotherapy    TX Plan Objectives: 1 1, 1 2, 1 4   Therapist: Dk Ashford MA, [place]

## 2021-06-02 NOTE — PSYCH
This note was not shared with the patient due to this is a psychotherapy note  Subjective:     Patient ID: Manuel Lizama is a 77 y o  female  [facility] Clinical Progress Notes      Specialized Services Documentation  Therapist must complete separate progress note for each specific clinical activity in which the individual participated during the day  Group Psychotherapy [id number] Earl Tariq actively shared in psychotherapy group focused on Cognitive Distortions with also participating in our Progressive Muscle Relaxation exercise to finish the group  Earl Tariq related to (Jumping to Conclusions) being their most difficult cognitive distortion that they can get stuck in  Earl Tariq also engaged with the Kvng [place] that was an aide at looking at how their thoughts, emotions, and behaviors can all be connected   Tx Plan Objective: 1 1,1 2 Therapist:  GEORGE Mendoza

## 2021-06-02 NOTE — PSYCH
This note was not shared with the patient due to this is a psychotherapy note   Subjective:     Patient ID: Yudi Robles is a 77 y o  female  [facility] Clinical Progress Notes      Specialized Services Documentation  Therapist must complete separate progress note for each specific clinical activity in which the individual participated during the day  Education Therapy   [id number] Yudi Robles actively shared in morning assessment and goal review  Presented as Receptive related to readiness to learn  Yudi Robles did complete goal from last treatment day identifying gaining hope  did not present with any barriers to learning  [id number] Yudi Robles engaged throughout the treatment day  Was engaged in learning related to Illness and Wellness Tools  Staff utilized Verbal and A/V teaching methods  Yudi Robles shared area of learning and set a goal for outside of program to work on cleaning up mom's room for 10 minutes        Tx Plan Objective: 1 1,1 2, Therapist:  Clif CASTILLO

## 2021-06-02 NOTE — PSYCH
This note was not shared with the patient due to this is a psychotherapy note    Subjective:     Patient ID: Carole Enciso is a 77 y o  female  [facility] Clinical Progress Notes      Specialized Services Documentation  Therapist must complete separate progress note for each specific clinical activity in which the individual participated during the day  Group Psychotherapy Life Skills Group (12:30-1:30)- Carole Enciso actively engaged in group focused on the stress cycle which was facilitated virtually in a private office using HIPAA Compliant and Approved Microsoft Teams  Carole Enciso consented to the use of tele-video modality of treatment and was virtually present for group psychotherapy today  Clients watched a mary lou talk, Fight, Flight or Freeze: Your Body's defense mechanism by Lindsay Page, and learned about the stress cycle and how the brain works when under stress  During group, each group member talked about something stressful in their life and what their stress symptoms are  Clients identified past negative ways that they coped  Client stated that a positive coping skills they intend to use is petting their pet to calm down  The group discussed the importance of recognizing the warning signs of stress and why it is important to cope positively  Carole Enciso continues to make progress towards goals through verbal participation in group; to accomplish long term goals continue to utilize skills learned in programming  Continue with psychotherapy to educate and encourage use of wellness tools  Tx Plan Objective: 1 1,1 2 Therapist: Carolee Mcardle, MSW      Case Management Note    Carolee Mcardle, MSW    Current suicide risk : Low     1:45- 2:15- Jewell Fair spoke to  about her feelings about the program  Jewell Fair states that she feels like other group members can not relate to what she is going through   She states that she can relate to their feelings/experiences and can contribute that way but does not feel like they would understand what she is going through   explained that even though they may not understand exactly what she is going through that maybe some group members can relate to some of the feelings that she is experiencing  Cele Oren would like to continue the program but would only like to be in the program until June 14th then continue to see her OP therapist and Nikkie Tyson has follow up set up with her psychiatrist on June 18, 2021 at 11am and with her therapist on June 23, 2021 at 3:30  Cele Oren stated that she has not picked up her prescription of Lexapro at the pharmacy but intends to pick it up today  Discussed the loss of her mother and father and the guilt she feels  Reviewed her treatment plan  Medications changes/added/denied? No    Treatment session number: 2    Individual Case Management Visit provided today?  Yes     [facility] follow up physician's orders: none at this time

## 2021-06-03 ENCOUNTER — OFFICE VISIT (OUTPATIENT)
Dept: PSYCHOLOGY | Facility: CLINIC | Age: 76
End: 2021-06-03
Payer: COMMERCIAL

## 2021-06-03 DIAGNOSIS — F33.2 MDD (MAJOR DEPRESSIVE DISORDER), RECURRENT SEVERE, WITHOUT PSYCHOSIS (HCC): ICD-10-CM

## 2021-06-03 DIAGNOSIS — F41.0 PANIC ATTACKS: Primary | ICD-10-CM

## 2021-06-03 PROCEDURE — G0176 OPPS/PHP;ACTIVITY THERAPY: HCPCS

## 2021-06-03 PROCEDURE — G0410 GRP PSYCH PARTIAL HOSP 45-50: HCPCS

## 2021-06-03 PROCEDURE — G0177 OPPS/PHP; TRAIN & EDUC SERV: HCPCS

## 2021-06-03 NOTE — PSYCH
This note was not shared with the patient due to this is a psychotherapy note  Subjective:     Patient ID: Maria Esther Muir is a 77 y o  female  [facility] Clinical Progress Notes      Specialized Services Documentation  Therapist must complete separate progress note for each specific clinical activity in which the individual participated during the day  Group Psychotherapy [id number]  Jule Baumgarten actively shared in psychotherapy group focused on open discussion regarding the values that each member choose, regarding from the selection of different core values laid out in the beginning of group  Group discussion then focused on commonalities between values shared among group members  And then also shared and discussed barriers to achieving or working toward these values in their life currently   Tx Plan Objective: 1 1,1 2 Therapist:  GEORGE Ca

## 2021-06-03 NOTE — PSYCH
This note was not shared with the patient due to this is a psychotherapy note   Subjective:     Patient ID: Shante Walsh is a 77 y o  female  [facility] Clinical Progress Notes      Specialized Services Documentation  Therapist must complete separate progress note for each specific clinical activity in which the individual participated during the day  GROUP PSYCHOTHERAPY ([id number]) The group completed a Character Strengths Inventory and an activity that prompted areas of gratefulness  The group was educated on the value of recognizing a strong foundation on which to base their insights and focus  Members disclosed personal experiences with struggles and challenges that led to understanding strengths and their journey  Members were encouraged to begin to re-write their journey as if it were an episode of a television show  Each member shared what the title of their episode would be based on the strengths and areas of gratefulness identified from the activities  Андрей Westbrook shared that her title would be "Life with Ed"  Андрей Westbrook continues to demonstrate insight through verbal participation in group  Continue with psychotherapy    TX Plan Objectives: 1 1, 1 2, 1 4   Therapist: Paula Valenzuela MA, [place]

## 2021-06-03 NOTE — PSYCH
This note was not shared with the patient due to this is a psychotherapy note    Subjective:     Patient ID: Lisbet Milligan is a 77 y o  female  [facility] Clinical Progress Notes      Specialized Services Documentation  Therapist must complete separate progress note for each specific clinical activity in which the individual participated during the day  Case Management Note    GEORGE Mckeon    Current suicide risk : Low     A case management session is not scheduled today with Lisbet Milligan ; additionally, they did not request a CM meeting  Next scheduled session is 6/4/21  Medications changes/added/denied? No    Treatment session number: 3    Individual Case Management Visit provided today?  No    [facility] follow up physician's orders: None at this time

## 2021-06-03 NOTE — PSYCH
This note was not shared with the patient due to this is a psychotherapy note    Subjective:     Patient ID: William Bhatia is a 77 y o  female  [facility] Clinical Progress Notes      Specialized Services Documentation  Therapist must complete separate progress note for each specific clinical activity in which the individual participated during the day  Allied Therapy  This group was facilitated virtually in a private office using [employer] and Approved Dick or Bro Teams  William Bhatia consented to the use of tele-video modality of treatment  [id number] William Bhatia did not actively shared in [facility] group due to having to miss part of group for case management  Romayne radha sat in on the last half of group and actively wrote down her glimmers (the small daily mundane things we can find joys in)  Group  focused on the use of meditation chant to extend permission to feel happiness, safety, ease of living, and be healthy  Group worked on writing a chant based around those 4 elements using may I as a way to extend these things to themselves and others  Selvin engaged in group by sharing his chant and participating in verbal dialogue  Group explored practice of writing their own chant using the May I and including the 4 principles in their own way, extending their chant on to others, as well as listening to others' chants  At the end of the group, they were identifying their glimmers  She was observed writing down her glimmers    Some effort noted toward treatment goal   Continue AT to encourage the development and practice of meditation chant to  alleviate symptoms and support wellness       Tx Plan Objective: 1 1, Therapist:  JONATHAN Bonilla

## 2021-06-03 NOTE — PSYCH
This note was not shared with the patient due to this is a psychotherapy note    Subjective:     Patient ID: Cisco Mcbride is a 77 y o  female  [facility] Clinical Progress Notes      Specialized Services Documentation  Therapist must complete separate progress note for each specific clinical activity in which the individual participated during the day  Allied Therapy  [phone] Cisco Mcbride actively shared in [facility] group focused on the identification of how to live within our authentic selves and the thought process that goes into thinking about answering the question of: Who Am I? Cisco Mcbride engaged in group by learning the 12 steps used to identify when we are at conflict with our authentic self, writing a verse about "I am" statements that explains who they think they are as a person, and shared with the group their verse and processed it  Group explored practice of songwriting and working within the 12 step model to uncover the answer of "Who AM I?"  Cisco Mcbride  identified while they can find positive statements about their authentic self, they realized there is a lot of conflict with their adaptive self  She discovered today while she feels lonely and friendless a lot of it comes from living in her fears and doubts which prevent her from taking a risk to seek out friendship with others  Bretttavon Duarte also shared the power of writing this on paper and being able to see it firsthand was a very powerful exercise for her   Some effort noted toward treatment goal   Continue AT to encourage the development and practice of using the 12 step identity model to alleviate symptoms and support wellness  Tx Plan Objective: 1 1, 1 2, 1 4 Therapist:  JNOATHAN Morgan       Education Therapy   [id number] Cisco Mcbride actively shared in morning assessment and goal review  Presented as Receptive related to readiness to learn    Cisco Mcbride did not complete goal from last treatment day identifying that she felt disappointment with feeling as if she did not utilize appropriate coping skills as well as struggling to let go of feelings of anger from events that occurred last night  Jade Lopez was hoping to gain a sense of responsibility when accomplishing her goal   did not present with any barriers to learning  [id number]  Nikita Carballo engaged throughout the treatment day  Was engaged in learning related to Illness, Medication, Aftercare and Wellness Tools  Staff utilized Verbal, Written, A/V and Demonstration teaching methods  Nikita Carballo shared area of learning and set a goal for outside of program to cook dinner and clean the house and hopes to gain responsibility        Tx Plan Objective: 1 1, 1 2, 1 4 Therapist:  Aron Aase, MT-BC

## 2021-06-04 ENCOUNTER — OFFICE VISIT (OUTPATIENT)
Dept: PSYCHOLOGY | Facility: CLINIC | Age: 76
End: 2021-06-04
Payer: COMMERCIAL

## 2021-06-04 DIAGNOSIS — F33.2 MDD (MAJOR DEPRESSIVE DISORDER), RECURRENT SEVERE, WITHOUT PSYCHOSIS (HCC): Primary | ICD-10-CM

## 2021-06-04 PROCEDURE — G0410 GRP PSYCH PARTIAL HOSP 45-50: HCPCS

## 2021-06-04 PROCEDURE — G0176 OPPS/PHP;ACTIVITY THERAPY: HCPCS

## 2021-06-04 PROCEDURE — G0177 OPPS/PHP; TRAIN & EDUC SERV: HCPCS

## 2021-06-04 NOTE — PSYCH
Subjective:     Patient ID: Keila Peterson is a 77 y o  female  [facility] Clinical Progress Notes      Specialized Services Documentation  Therapist must complete separate progress note for each specific clinical activity in which the individual participated during the day  Group Psychotherapy  (9:30-10:30) Keila Peterson was present in person for this group on managing disappointment  Participants were engaged in conversation about life's disappointments, the way disappointment feels and common reasons people avoid disappointment  Participants were asked to reflect on ways that disappointment can have benefits in life such as promoting change, and acceptance, planning strategies for the future and using caution in decision making  Participants learned how not accepting and recognizing disappointment can lead to feeling stuck, feelings of anger and a lack of emotional and social growth  Participants were asked to reflect on whether or not they avoid disappointment and why  Participants then learned some ways to manage disappointment by asking What, (Do they believe one thing will make them happy) Who, (Do they believe only one person will make them happy and/or focus one what they believe should be doing for us rather than how we are acting?) When,( Are they setting a specific time frame for how long it will take to get what they want? Are they making social comparisons? How (Do they have fixed ideas about how it is all going to come together?) Participants were asked to take these things out of the equation to reflect on what they really want in life  The group ended with reading the poem, "[employer]," by [facility] and reflecting on the analogy of guest as feelings  Participants were asked how they feel when guest show up unexpected? Feelings? What is their reaction? How might unexpected feelings/ benefit us?  In times when their homes are vacant of feelings, how can they make room for other things? Farnaz Cabrera was actively engaged throughout this group  She shared disappointment surrounding declining health as well as "shutting down" because of she feels she gave all she had to others and feels "hollow " She talked about her relationship with her daughter and how it "sucks her dry," and how she feels her daughter does not recognize her needs  The group discussed boundaries and being clear about what one needs with others as well as realizing one cannot be responsible for any one else  Deepa Carroll was in agreement that this time in her life is a time for her to begin setting those boundaries for herself and for her family  Tx Plan Objective: 1 1,1 2, Therapist:  Darlin Litten, LSW    This note was not shared with the patient due to this is a psychotherapy note

## 2021-06-04 NOTE — PSYCH
This note was not shared with the patient due to this is a psychotherapy note  Subjective:     Patient ID: Taylor Redd is a 77 y o  female  [facility] Clinical Progress Notes      Specialized Services Documentation  Therapist must complete separate progress note for each specific clinical activity in which the individual participated during the day  GROUP PSYCHOTHERAPY ([id number]) Group was facilitated virtually in a private office using HIPAA Compliant and Approved Microsoft Teams  Taylor Redd consented to the use of tele-video modality of treatment and was virtually present for group psychotherapy today  The group engaged in the wellness assessment that evaluates progress on several different areas of wellness: physical, emotional, cognitive, vocational, social and spiritual  Clients rated their progress and discussed areas that need work  [place] continues to make progress towards goals through verbal participation in group  Continue with psychotherapy     TX Plan Objectives: 1 1, 1 2, 1 4  Therapist:  GEORGE Anaya

## 2021-06-04 NOTE — PSYCH
This note was not shared with the patient due to this is a psychotherapy note    Subjective:     Patient ID: Junaid Santo is a 77 y o  female  [facility] Clinical Progress Notes      Specialized Services Documentation  Therapist must complete separate progress note for each specific clinical activity in which the individual participated during the day  Education Therapy   [id number] Junaid Santo actively shared in morning assessment and goal review  Presented as Receptive related to readiness to learn  Junaid Santo did complete goal from last treatment day identifying gaining hope  did not present with any barriers to learning  [phone] Junaid Santo engaged throughout the treatment day  Was engaged in learning related to Illness, Medication, Aftercare and Wellness Tools  Staff utilized Verbal, Written, A/V and Demonstration teaching methods  Junaid Santo shared area of learning and set a goal for outside of program to allow her  to help  Tx Plan Objective: 1 1,1 2 Therapist:  GEORGE Baum    Case Management Note    GEORGE Baum    Current suicide risk : Low     12:33-12:45-  discussed the МАРИНА technique to help with managing tasks with Maria G Almendarez discussed her need to be in control of everything in her house and described herself as a perfectionist  Discussed the feeling of being overwhelmed and how the [place] [employer] technique may help her with deciding what to do with task: Delete, Delay, [address]  Medications changes/added/denied? No    Treatment session number: 4    Individual Case Management Visit provided today?  Yes     [facility] follow up physician's orders: none at this time

## 2021-06-07 ENCOUNTER — OFFICE VISIT (OUTPATIENT)
Dept: PSYCHOLOGY | Facility: CLINIC | Age: 76
End: 2021-06-07
Payer: COMMERCIAL

## 2021-06-07 ENCOUNTER — OFFICE VISIT (OUTPATIENT)
Dept: INTERNAL MEDICINE CLINIC | Facility: CLINIC | Age: 76
End: 2021-06-07
Payer: COMMERCIAL

## 2021-06-07 ENCOUNTER — OFFICE VISIT (OUTPATIENT)
Dept: PSYCHIATRY | Facility: CLINIC | Age: 76
End: 2021-06-07
Payer: COMMERCIAL

## 2021-06-07 VITALS
OXYGEN SATURATION: 98 % | BODY MASS INDEX: 37.42 KG/M2 | RESPIRATION RATE: 16 BRPM | DIASTOLIC BLOOD PRESSURE: 70 MMHG | SYSTOLIC BLOOD PRESSURE: 148 MMHG | WEIGHT: 198.2 LBS | HEART RATE: 81 BPM | HEIGHT: 61 IN

## 2021-06-07 DIAGNOSIS — F41.1 GAD (GENERALIZED ANXIETY DISORDER): ICD-10-CM

## 2021-06-07 DIAGNOSIS — M54.2 NECK PAIN: Primary | ICD-10-CM

## 2021-06-07 DIAGNOSIS — F41.0 PANIC ATTACKS: ICD-10-CM

## 2021-06-07 DIAGNOSIS — M79.7 FIBROMYALGIA: Chronic | ICD-10-CM

## 2021-06-07 DIAGNOSIS — F33.2 MDD (MAJOR DEPRESSIVE DISORDER), RECURRENT SEVERE, WITHOUT PSYCHOSIS (HCC): Primary | ICD-10-CM

## 2021-06-07 DIAGNOSIS — N18.32 STAGE 3B CHRONIC KIDNEY DISEASE (HCC): ICD-10-CM

## 2021-06-07 PROCEDURE — 3008F BODY MASS INDEX DOCD: CPT | Performed by: NURSE PRACTITIONER

## 2021-06-07 PROCEDURE — 3078F DIAST BP <80 MM HG: CPT | Performed by: INTERNAL MEDICINE

## 2021-06-07 PROCEDURE — G0177 OPPS/PHP; TRAIN & EDUC SERV: HCPCS

## 2021-06-07 PROCEDURE — G0410 GRP PSYCH PARTIAL HOSP 45-50: HCPCS

## 2021-06-07 PROCEDURE — 3077F SYST BP >= 140 MM HG: CPT | Performed by: INTERNAL MEDICINE

## 2021-06-07 PROCEDURE — 99213 OFFICE O/P EST LOW 20 MIN: CPT | Performed by: NURSE PRACTITIONER

## 2021-06-07 PROCEDURE — G0176 OPPS/PHP;ACTIVITY THERAPY: HCPCS

## 2021-06-07 PROCEDURE — 99213 OFFICE O/P EST LOW 20 MIN: CPT | Performed by: INTERNAL MEDICINE

## 2021-06-07 RX ORDER — INSULIN PUMP CONTROLLER
EACH MISCELLANEOUS
COMMUNITY
Start: 2021-06-06

## 2021-06-07 RX ORDER — CYCLOBENZAPRINE HCL 5 MG
5 TABLET ORAL
Qty: 14 TABLET | Refills: 0 | Status: SHIPPED | OUTPATIENT
Start: 2021-06-07 | End: 2021-08-05 | Stop reason: ALTCHOICE

## 2021-06-07 NOTE — PSYCH
This note was not shared with the patient due to this is a psychotherapy note  Subjective:     Patient ID: Carole Enciso is a 77 y o  female  [facility] Clinical Progress Notes      Specialized Services Documentation  Therapist must complete separate progress note for each specific clinical activity in which the individual participated during the day  Group Psychotherapy [id number] Erin Strickland was involved in a group that started with a video on a speaker from a mary lou talk presentation geared around how phones can steal our attention and get us pulled in longer than we attended  Transitioning into an open discussion portion where Erin Strickland participated sharing how phones/social media can affect our mood and overall well-being  Boundaries such tracking your time on certain apps was one way to monitor and assess ones own current issues regarding their phone use   Tx Plan Objective: 1 1,1 2 Therapist:  GEORGE Cadet

## 2021-06-07 NOTE — ASSESSMENT & PLAN NOTE
Patient has a hx of fibromyalgia which was well controlled with Cymbalta previously  Patient starting to have recurrence of pain after being switched to lexapro  Plan:    · Discussed with nephrology that Cymbalta is still safe for patient with GFR 31, as long as it was between 30-60 mg

## 2021-06-07 NOTE — PSYCH
This note was not shared with the patient due to this is a psychotherapy note    Subjective:     Patient ID: Adelita Garcia is a 77 y o  female  [facility] Clinical Progress Notes      Specialized Services Documentation  Therapist must complete separate progress note for each specific clinical activity in which the individual participated during the day  Group Psychotherapy Life Skills Group (12:30-1:30) Adelita Garcia actively engaged in group focused on stress management   During group we discussed the use of social support, emotional management skills, maintaining a healthy life balance and attending to basic needs as ways to help manage stress  Group members provided other suggestions on stress management techniques  Adelita Garcia shared about reducing the negative effects of stress by using the strategy of managing responsibilities  The group reviewed stress management tips  Adelita Garcia continues to make progress towards goals through verbal participation in group; to accomplish long term goals continue to utilize skills learned in programming  Continue with psychotherapy to educate and encourage use of wellness tools  TX Plan Objective: 1 1, 1 2 Therapist: GEORGE Blackmon    Case Management Note    GEORGE Blackmon    Current suicide risk : Low     10:35-10:45 -  meet with Mayco Mao discussed her physical ailments and that she has an appointment set up with her PCP today  Mayco met with Sandra Kinney for medication management and stated it went well and there was no changes made  Medications changes/added/denied? No    Treatment session number: 5    Individual Case Management Visit provided today?  Yes     [facility] follow up physician's orders: none at this time

## 2021-06-07 NOTE — PROGRESS NOTES
Assessment/Plan:    Fibromyalgia  Patient has a hx of fibromyalgia which was well controlled with Cymbalta previously  Patient starting to have recurrence of pain after being switched to lexapro  Plan:    · Discussed with nephrology that Cymbalta is still safe for patient with GFR 31, as long as it was between 30-60 mg      Neck pain  Patient has been having neck pain that has started over the past 10 days with decreased ROM and radiation to there head  Patient had similar episodes back in 2017  · Flexeril 5 mg QHS  · EKG in one week   · Will have patient f/u with psych to see if Cymbalta will still be appropriate for patient  Diagnoses and all orders for this visit:    Neck pain  -     cyclobenzaprine (FLEXERIL) 5 mg tablet; Take 1 tablet (5 mg total) by mouth daily at bedtime for 14 days  -     ECG with rhythm strip; Future    Fibromyalgia    Stage 3b chronic kidney disease ([facility] [address] )    Other orders  -     Cancel: Hepatitis C Antibody ([facility]); Future  -     Cancel: PNEUMOCOCCAL POLYSACCHARIDE VACCINE 23-VALENT =>1YO SQ IM  -     Insulin Disposable Pump (OmniPod Dash 5 Pack Pods) MISC; USE 1 POD EVERY 1 5 DAYS          Subjective:      Patient ID: Donald Chávez is a 77 y o  female  Neck Pain   This is a recurrent problem  The current episode started 1 to 4 weeks ago  The problem occurs constantly  The problem has been unchanged  Associated with: Discontinuing Cymbalta  The pain is present in the left side  The quality of the pain is described as cramping and aching  The pain is at a severity of 7/10  The pain is severe  The pain is same all the time  Associated symptoms include headaches  Pertinent negatives include no chest pain, fever, leg pain or numbness  She has tried nothing for the symptoms  The treatment provided no relief         The following portions of the patient's history were reviewed and updated as appropriate: allergies, current medications, past family history, past medical history, past social history, past surgical history and problem list     Review of Systems   Constitutional: Negative for fever  Cardiovascular: Negative for chest pain  Musculoskeletal: Positive for neck pain  Neurological: Positive for headaches  Negative for numbness  All other systems reviewed and are negative  Objective:      /70   Pulse 81   Resp 16   Ht 5' 1" (1 549 m)   Wt 89 9 kg (198 lb 3 2 oz)   SpO2 98%   BMI 37 45 kg/m²          Physical Exam  Vitals signs reviewed  Constitutional:       General: She is not in acute distress  Appearance: Normal appearance  HENT:      Head: Normocephalic and atraumatic  Nose: No congestion or rhinorrhea  Mouth/Throat:      Pharynx: Oropharynx is clear  Eyes:      General: No scleral icterus  Right eye: No discharge  Left eye: No discharge  Extraocular Movements: Extraocular movements intact  Conjunctiva/sclera: Conjunctivae normal       Pupils: Pupils are equal, round, and reactive to light  Neck:      Musculoskeletal: Neck rigidity and muscular tenderness present  Cardiovascular:      Rate and Rhythm: Normal rate and regular rhythm  Pulses: Normal pulses  Heart sounds: Normal heart sounds  No murmur  No gallop  Pulmonary:      Effort: Pulmonary effort is normal  No respiratory distress  Breath sounds: No wheezing or rales  Abdominal:      General: Abdomen is flat  Bowel sounds are normal  There is no distension  Palpations: Abdomen is soft  Tenderness: There is no abdominal tenderness  Musculoskeletal:         General: No swelling, tenderness or deformity  Right lower leg: No edema  Left lower leg: No edema  Lymphadenopathy:      Cervical: No cervical adenopathy  Skin:     General: Skin is warm and dry  Capillary Refill: Capillary refill takes less than 2 seconds  Coloration: Skin is not pale        Findings: No bruising, erythema or rash    Neurological:      General: No focal deficit present  Mental Status: She is alert and oriented to person, place, and time  Cranial Nerves: No cranial nerve deficit  Motor: No weakness     Psychiatric:         Mood and Affect: Mood normal          Behavior: Behavior normal

## 2021-06-07 NOTE — PSYCH
This note was not shared with the patient due to this is a psychotherapy note    Subjective:     Patient ID: Cordelia Ly is a 77 y o  female  [facility] Clinical Progress Notes      Specialized Services Documentation  Therapist must complete separate progress note for each specific clinical activity in which the individual participated during the day  Allied Therapy   [phone] Cordelia Ly  actively shared in [facility] group focused on the identification of an emergency coping skill plan to prevent relapse in mental helath  Cordelia Ly engaged in the group by analyzing lyrics and sharing personal reflections  Group explored practice of creating their emergency coping skill plan, analyzing how they currently react to difficult people and situations, as well as listened to a song and analyzed the song for what behaviors or thoughts appeared to be related to a relapse  Cordelia Ly analyzed how they currently react to difficult people and situations  Calista Shin stated she felt she had difficulty relating to the song and being on a borderline  She identified social distractions, supports, and what could be removed from her environment  Calista Shin noted feelings of loneliness due to experiencing the pandemic and how her social supports and distractions are limited at this time  She also shared about how she really would like her  to be a social distractions but she feels as if it not always the case  Some effort noted toward treatment goal   Continue AT to encourage the development and practice of coping skills to create an emergency coping skill plan to alleviate symptoms and support wellness  Tx Plan Objective: 1 1, Therapist:  JONATHAN Simon      Education Therapy   [id number] Cordelia yL actively shared in morning assessment and goal review  Presented as Receptive related to readiness to learn    Cordelia Ly did not complete goal from last treatment day identified she wishes she would have completed the goal as she did not achieve a sense of support or responsibility this weekend  did not present with any barriers to learning  [id number] Lisbet Milligan engaged throughout the treatment day  Was engaged in learning related to Illness, Medication, Aftercare and Wellness Tools  Staff utilized Verbal, Written, A/V and Demonstration teaching methods  Lisbetzari Milligan shared area of learning and set a goal for outside of program to clean up her mom's room and go to doctor        Tx Plan Objective: 1 1, 1,2 & 1 4 Therapist:  JONATHAN Hickey

## 2021-06-07 NOTE — PSYCH
Virtual Regular Visit    Problem List Items Addressed This Visit        Other    Panic attacks    MDD (major depressive disorder), recurrent severe, without psychosis ([facility] [address] ) - Primary    MADAN (generalized anxiety disorder)             Encounter provider [facility]  [facility]    Provider located at   [address] Debbie Garsia [facility] [address] [phone]    Recent Visits  No visits were found meeting these conditions  Showing recent visits within past 7 days and meeting all other requirements     Future Appointments  No visits were found meeting these conditions  Showing future appointments within next 150 days and meeting all other requirements      The patient was identified by name and date of birth  Karie Pimentel was informed that this is a telemedicine visit and that the visit is being conducted through [employer]  My office door was closed  No one else was in the room  She acknowledged consent and understanding of privacy and security of the video platform  The patient has agreed to participate and understands they can discontinue the visit at any time  Patient is aware this is a billable service       HPI     Current Outpatient Medications   Medication Sig Dispense Refill    albuterol (2 5 mg/3 mL) 0 083 % nebulizer solution Take 3 mL (2 5 mg total) by nebulization every 6 (six) hours as needed for wheezing 75 mL 0    albuterol (PROAIR HFA) 90 mcg/act inhaler Inhale 2 puffs every 6 (six) hours as needed       ALPRAZolam (XANAX) 0 5 mg tablet Take 1 tablet (0 5 mg total) by mouth 2 (two) times a day as needed for anxiety 60 tablet 1    aspirin 81 MG tablet Take 81 mg by mouth daily      B-D UF III MINI PEN NEEDLES 31G X 5 MM MISC       BAQSIMI TWO PACK 3 MG/DOSE POWD Use as directed  0    betamethasone dipropionate 0 05 % lotion       cholecalciferol (VITAMIN D3) 1,000 units tablet Take 2,000 Units by mouth daily  diclofenac sodium (VOLTAREN) 1 % Apply 2 g topically 4 (four) times a day (Patient not taking: Reported on 4/16/2021) 1 Tube 0    diltiazem (TIAZAC) 300 MG 24 hr capsule Take 300 mg by mouth daily      docusate sodium (COLACE) 100 mg capsule Take 1 capsule (100 mg total) by mouth 2 (two) times a day 20 capsule 0    enalapril (VASOTEC) 20 mg tablet Take 20 mg by mouth daily      escitalopram (LEXAPRO) 10 mg tablet Take 5mg (1/2 tab) daily  If well tolerated after 1-2 weeks increase to 10mg  30 tablet 0    insulin aspart (NovoLOG) 100 units/mL injection Inject 12 Units under the skin 3 (three) times a day before meals (Patient taking differently: Inject under the skin 3 (three) times a day before meals )  0    Insulin Disposable Pump (OMNIPOD DASH 5 PACK) MISC CHANGE PODS EVERY 2 DAYS UTD  3    isosorbide mononitrate (IMDUR) 60 mg 24 hr tablet Take 60 mg by mouth daily at bedtime      levothyroxine 50 mcg tablet Take 50 mcg by mouth daily      nitroglycerin (NITROLINGUAL) 0 4 mg/spray spray USE ONE SPRAY Q 5 MINUTES AS NEEDED FOR CHEST PAIN  IF NO RELIEF, CALL 911   1    rosuvastatin (CRESTOR) 20 MG tablet Take 20 mg by mouth every evening  2    simethicone (MYLICON,GAS-X) 426 MG capsule Take 1 capsule (180 mg total) by mouth every 12 (twelve) hours 20 capsule 0     No current facility-administered medications for this visit  Review of Systems  Video Exam    There were no vitals filed for this visit  Physical Exam   As a result of this visit, I have referred the patient for further respiratory evaluation  No    I spent 15 minutes directly with the patient during this visit  VIRTUAL VISIT DISCLAIMER    Valentina Tripp acknowledges that she has consented to an online visit or consultation   She understands that the online visit is based solely on information provided by her, and that, in the absence of a face-to-face physical evaluation by the physician, the diagnosis she receives is both limited and provisional in terms of accuracy and completeness  This is not intended to replace a full medical face-to-face evaluation by the physician  Maria Esther Muir understands and accepts these terms  PHP MEDICATION MANAGEMENT NOTE        ST Greg Valentine    Name and Date of Birth: Maria Esther Muir 77 y o  1945 MRN: [id number]    Date of Visit: June 7, 2021    Allergies   Allergen Reactions    Molds & Smuts Allergic Rhinitis    Other Allergic Rhinitis     RAGWEED, CAT DANDER, DOG DANDER     Pollen Extract Other (See Comments)     Cold symptoms       SUBJECTIVE:    Jule Baumgarten is seen today for a follow up for Major Depressive Disorder and Generalized Anxiety Disorder  She reports that she has experienced ongoing symptoms since beginning PHP  Patient is recently transitioned off of Cymbalta and onto Escitalopram to date age and kidney function concerns  States that she has had recurrence of neck pain and flushing since transition off of Cymbalta  States that these are longstanding no medical issues  Patient encouraged to work with PCP and appropriate specialists such as Cardiology to address these issues    Symptoms may improve with additional time to adjust to medication change to Escitalopram        PLAN:   continue Lexapro 10 mg p o  daily   work with PCP to addressed physical pain and positional flushing while supine  Aware of 24 hour and weekend coverage for urgent situations accessed by calling [facility] main practice number  Continue partial hospitalization program    Diagnoses and all orders for this visit:    MDD (major depressive disorder), recurrent severe, without psychosis ([facility] [address] )    Panic attacks    MADAN (generalized anxiety disorder)        Current Outpatient Medications on File Prior to Visit   Medication Sig Dispense Refill    albuterol (2 5 mg/3 mL) 0 083 % nebulizer solution Take 3 mL (2 5 mg total) by nebulization every 6 (six) hours as needed for wheezing 75 mL 0    albuterol (PROAIR HFA) 90 mcg/act inhaler Inhale 2 puffs every 6 (six) hours as needed       ALPRAZolam (XANAX) 0 5 mg tablet Take 1 tablet (0 5 mg total) by mouth 2 (two) times a day as needed for anxiety 60 tablet 1    aspirin 81 MG tablet Take 81 mg by mouth daily      B-D UF III MINI PEN NEEDLES 31G X 5 MM MISC       BAQSIMI TWO PACK 3 MG/DOSE POWD Use as directed  0    betamethasone dipropionate 0 05 % lotion       cholecalciferol (VITAMIN D3) 1,000 units tablet Take 2,000 Units by mouth daily      diclofenac sodium (VOLTAREN) 1 % Apply 2 g topically 4 (four) times a day (Patient not taking: Reported on 4/16/2021) 1 Tube 0    diltiazem (TIAZAC) 300 MG 24 hr capsule Take 300 mg by mouth daily      docusate sodium (COLACE) 100 mg capsule Take 1 capsule (100 mg total) by mouth 2 (two) times a day 20 capsule 0    enalapril (VASOTEC) 20 mg tablet Take 20 mg by mouth daily      escitalopram (LEXAPRO) 10 mg tablet Take 5mg (1/2 tab) daily  If well tolerated after 1-2 weeks increase to 10mg  30 tablet 0    insulin aspart (NovoLOG) 100 units/mL injection Inject 12 Units under the skin 3 (three) times a day before meals (Patient taking differently: Inject under the skin 3 (three) times a day before meals )  0    Insulin Disposable Pump (OMNIPOD DASH 5 PACK) MISC CHANGE PODS EVERY 2 DAYS UTD  3    isosorbide mononitrate (IMDUR) 60 mg 24 hr tablet Take 60 mg by mouth daily at bedtime      levothyroxine 50 mcg tablet Take 50 mcg by mouth daily      nitroglycerin (NITROLINGUAL) 0 4 mg/spray spray USE ONE SPRAY Q 5 MINUTES AS NEEDED FOR CHEST PAIN  IF NO RELIEF, CALL 911   1    rosuvastatin (CRESTOR) 20 MG tablet Take 20 mg by mouth every evening  2    simethicone (MYLICON,GAS-X) 020 MG capsule Take 1 capsule (180 mg total) by mouth every 12 (twelve) hours 20 capsule 0     No current facility-administered medications on file prior to visit  Psychotherapy Provided:     Individual psychotherapy provided:     HPI ROS Appetite Changes and Sleep:     She reports normal sleep, adequate appetite, adequate energy level   Patient denies suicidal or homicidal ideation    Review Of Systems:      General emotional problems and decreased functioning   Personality no change in personality   Constitutional negative   ENT negative   Cardiovascular negative   Respiratory negative   Gastrointestinal negative   Genitourinary negative   Musculoskeletal negative   Integumentary negative   Neurological negative   Endocrine negative   Other Symptoms none, all other systems are negative     Mental Status Evaluation:    Appearance Adequate hygiene and grooming   Behavior calm and cooperative   Mood anxious and depressed  Depression Scale -  of 10 (0 = No depression)  Anxiety Scale -  of 10 (0 = No anxiety)   Speech Normal rate and volume   Affect appropriate and mood-congruent   Thought Processes Goal directed and coherent   Thought Content Does not verbalize delusional material   Associations Tightly connected   Perceptual Disturbances Denies hallucinations and does not appear to be responding to internal stimuli   Risk Potential Suicidal/Homicidal Ideation - No evidence of suicidal or homicidal ideation and Patient does not verbalize suicidal or homicidal ideation  Risk of Violence - No evidence of risk for violence found on assessment  Risk of Self Mutilation - No evidence of risk for self mutilation found on assessment   Orientation oriented to person, place, time/date and situation   Memory recent and remote memory grossly intact   Consciousness alert and awake   Attention/Concentration attention span and concentration are age appropriate   Insight fair   Judgement fair   Muscle Strength and Gait normal muscle strength and normal muscle tone, normal gait/station and normal balance   Motor Activity no abnormal movements   Language no difficulty naming common objects, no difficulty repeating a phrase, no difficulty writing a sentence   Fund of Knowledge adequate knowledge of current events  adequate fund of knowledge regarding past history  adequate fund of knowledge regarding vocabulary      Past Psychiatric History Update:     Inpatient Psychiatric Admission Since Last Encounter:   no  Suicide Attempt Or Self Mutilation Since Last Encounter:   no  Incidence of Violent Behavior Since Last Encounter:   no    Traumatic History Update:     New Onset of Abuse Since Last Encounter:   no  Traumatic Events Since Last Encounter:   no    Past Medical History:    Past Medical History:   Diagnosis Date    Acute embolism and thrombosis of unspecified deep veins of unspecified lower extremity ([facility] [address] )     Last Assessed:  5/18/17    Anemia     Anosmia     Anxiety     Arthritis     Asthma     Back pain     Bilateral macular retinal edema     CAD (coronary artery disease)     Cataract     Cervical disc herniation     Cervical radiculopathy     Cervical spinal stenosis     Cervical spondylolysis     Chronic mastoiditis     Complex endometrial hyperplasia     Depression     Diabetes mellitus ([address] )     DVT (deep venous thrombosis) ([facility] [address] )     Fibromyalgia     Hyperlipidemia     Hypertension     Hypothyroid     Lumbar radiculopathy     Neck pain     Obese     Spinal stenosis     Stomach ulcer     Thyroid disease      No past medical history pertinent negatives    Past Surgical History:   Procedure Laterality Date    BACK SURGERY      CARPAL TUNNEL RELEASE      CATARACT EXTRACTION      CHOLECYSTECTOMY      COLONOSCOPY      CORONARY ANGIOPLASTY      CORONARY ARTERY BYPASS GRAFT      CYSTOSCOPY N/A 6/20/2017    Procedure: [employer];  Surgeon: Bhaskar Wheeler MD;  Location: [facility] MAIN [facility];  Service: Gynecology Oncology    TN LAPAROSCOPY W TOT HYSTERECTUTERUS <=250 GRAM  W TUBE/OVARY N/A 6/20/2017    Procedure: ROBOTIC HYSTERECTOMY; BILATERAL SALPINO-OOPHERECTOMY; umbilical hernia repair ;  Surgeon: Darin Pagan MD;  Location: [facility];  Service: Gynecology Oncology    TONSILECTOMY AND ADNOIDECTOMY      UMBILICAL HERNIA REPAIR       Allergies   Allergen Reactions    Molds & Smuts Allergic Rhinitis    Other Allergic Rhinitis     RAGWEED, CAT DANDER, DOG DANDER     Pollen Extract Other (See Comments)     Cold symptoms       Substance Abuse History:    Social History     Substance and Sexual Activity   Alcohol Use No     Social History     Substance and Sexual Activity   Drug Use No     Social History:    Social History     Socioeconomic History    Marital status: /Civil Union     Spouse name: Not on file    Number of children: 2    Years of education: High school or GED    Highest education level: Not on file   Occupational History    Occupation: Retired   Social Needs    Financial resource strain: Not on file    Food insecurity     Worry: Not on file     Inability: Not on file   Banno needs     Medical: Not on file     Non-medical: Not on file   Tobacco Use    Smoking status: Former Smoker     Packs/day: 1 00     Years: 6 00     Pack years: 6 00     Types: Cigarettes     Quit date:      Years since quittin 4    Smokeless tobacco: Never Used    Tobacco comment: Smoked about a half a pack for about 4 yrs  Quite about 50 yrs ago     Substance and Sexual Activity    Alcohol use: No    Drug use: No    Sexual activity: Never     Comment: No known STD risk factors   Lifestyle    Physical activity     Days per week: Not on file     Minutes per session: Not on file    Stress: Not on file   Relationships    Social connections     Talks on phone: Not on file     Gets together: Not on file     Attends Hindu service: Not on file     Active member of club or organization: Not on file     Attends meetings of clubs or organizations: Not on file     Relationship status: Not on file    Intimate partner violence     Fear of current or ex partner: Not on file     Emotionally abused: Not on file     Physically abused: Not on file     Forced sexual activity: Not on file   Other Topics Concern    Not on file   Social History Narrative    Lives independently with spouse    No known risk factors    Denied:  Exercising regularly     Family Psychiatric History:     Family History   Problem Relation Age of Onset    Arthritis Mother     Leukemia Mother     Other Mother         Anxiety, major depressive disorder, recurrent episode with atypical features    Coronary artery disease Father         Heart problem    Diabetes Father     Other Father         Infectious disease    Alzheimer's disease Maternal Grandmother     Other Maternal Grandfather         Heart problem    Other Daughter         Anxiety, major depressive disorder, recurrent episode with atypical features    Alcohol abuse Other         Grandparent    Cancer Family     Diabetes Family     Hypertension Family     Other Family         Reported prior back trouble, thyroid disorder     History Review: The following portions of the patient's history were reviewed and updated as appropriate: allergies, current medications, past family history, past medical history, past social history, past surgical history and problem list     OBJECTIVE:     Vital signs in last 24 hours: There were no vitals filed for this visit  Laboratory Results: I have personally reviewed all pertinent laboratory/tests results  Medications Risks/Benefits:      Risks, Benefits And Possible Side Effects Of Medications:    Discussed risks and benefits of treatment with patient including risk of suicidality, serotonin syndrome and SIADH related to treatment with antidepressants; Risk of induction of manic symptoms in certain patient populations     Controlled Medication Discussion:     Not applicable    ALAN Devine 06/07/21    This note was shared with patient

## 2021-06-07 NOTE — ASSESSMENT & PLAN NOTE
Patient has been having neck pain that has started over the past 10 days with decreased ROM and radiation to there head  Patient had similar episodes back in 2017  · Flexeril 5 mg QHS  · EKG in one week   · Will have patient f/u with psych to see if Cymbalta will still be appropriate for patient

## 2021-06-08 ENCOUNTER — OFFICE VISIT (OUTPATIENT)
Dept: PSYCHOLOGY | Facility: CLINIC | Age: 76
End: 2021-06-08
Payer: COMMERCIAL

## 2021-06-08 DIAGNOSIS — F33.2 MDD (MAJOR DEPRESSIVE DISORDER), RECURRENT SEVERE, WITHOUT PSYCHOSIS (HCC): Primary | ICD-10-CM

## 2021-06-08 DIAGNOSIS — F41.0 PANIC ATTACKS: ICD-10-CM

## 2021-06-08 DIAGNOSIS — F41.1 GAD (GENERALIZED ANXIETY DISORDER): ICD-10-CM

## 2021-06-08 PROCEDURE — G0410 GRP PSYCH PARTIAL HOSP 45-50: HCPCS

## 2021-06-08 PROCEDURE — G0176 OPPS/PHP;ACTIVITY THERAPY: HCPCS

## 2021-06-08 PROCEDURE — G0177 OPPS/PHP; TRAIN & EDUC SERV: HCPCS

## 2021-06-08 NOTE — PSYCH
This note was not shared with the patient due to this is a psychotherapy note    Subjective:     Patient ID: Lucien Delgado is a 77 y o  female  [facility] Clinical Progress Notes      Specialized Services Documentation  Therapist must complete separate progress note for each specific clinical activity in which the individual participated during the day  Case Management Note    GEORGE Dotson    Current suicide risk : Low     1:45-2:00- John Harding discussed that there may be a medication adjustment that may be occurring with the Duloxetine for her Fibromyalgia  She stated the her PCP will be coordinating care with Dr Arti Cochran  She will keep  update  Discussed thinking about an updated goal for her treatment plan  Reviewed case management schedule  Medications changes/added/denied? No    Treatment session number: 6    Individual Case Management Visit provided today?  Yes     [facility] follow up physician's orders: none at this time

## 2021-06-08 NOTE — PSYCH
This note was not shared with the patient due to this is a psychotherapy note   Subjective:     Patient ID: Jarred Quick is a 77 y o  female  [facility] Clinical Progress Notes      Specialized Services Documentation  Therapist must complete separate progress note for each specific clinical activity in which the individual participated during the day  GROUP PSYCHOTHERAPY ([id number]) The group was educated on SMART goal criteria and practiced creating goals based on the criteria and their aspirations  The members shared their initial goal and the adjusted goal to reflect SMART goal setting  Jin Zurita continues to make progress towards goals through verbal participation in group  Continue with psychotherapy  [facility] Objectives: 1 1, 1 2, 1 4   Therapist: Olegario Warner MA, [facility]     Education Therapy   [id number] Jarred Quick actively shared in morning assessment and goal review  Presented as Receptive related to readiness to learn  Jarred Quick did complete goal from last treatment day identifying gaining responsibility  did not present with any barriers to learning  [id number] Jarred Quick engaged throughout the treatment day  Was engaged in learning related to Illness, Medication, Aftercare and Wellness Tools  Staff utilized Verbal, Written, A/V and Demonstration teaching methods  Jarred Quick shared area of learning and set a goal for outside of program to phone calls about insulin        Tx Plan Objective: 1 1, 1 2, 1 4 Therapist:  Olegario Warner MA, [facility]

## 2021-06-08 NOTE — PSYCH
This note was not shared with the patient due to this is a psychotherapy note   Subjective:     Patient ID: Brian Vega is a 77 y o  female  [facility] Clinical Progress Notes      Specialized Services Documentation  Therapist must complete separate progress note for each specific clinical activity in which the individual participated during the day  Group Psychotherapy   [id number]  Brian Vega  actively shared in psychotherapy group focused DBT Module Interpersonal Effectiveness and [facility] skill  Engaged in tasks exploring what makes it difficult to share and strategies to improve with supports  She participated in discussion related to communication roadblocks  She was an active participant as group worked together to practice writing out a meaningful interaction using [facility]  Ros Balta was attentive and shared when prompted but no significant self-disclosure  Slow progress toward goal noted  Continue AT to encourage sharing, personal advocacy and practice of wellness tools           Tx Plan Objective: 1 4, Therapist:  Narendra CASTILLO

## 2021-06-08 NOTE — PSYCH
This note was not shared with the patient due to this is a psychotherapy note    Subjective:     Patient ID: William Bhatia is a 77 y o  female  [facility] Clinical Progress Notes      Specialized Services Documentation  Therapist must complete separate progress note for each specific clinical activity in which the individual participated during the day  Allied Therapy  [id number] William Bhatia actively shared in [facility] group focused on using various music strategies to encourage grounding and mindfulness  William Bhatia engaged in the group by participating in each experience presented as well as sharing her personal insight related to each experience   Group explored practice of using different strategies for mindfulness  William Bhatia was unsuccessful in sharing a takeaway or what works for her as she only chose to focus on the negative of things she did not like about group  Romayne Doyne was encouraged that when she feels annoyed by an experience that she can practice her distress tolerance skills   Some effort noted toward treatment goal   Continue AT to encourage the development and practice of mindfulness techniques to  alleviate symptoms and support wellness    Tx Plan Objective: 1 1, 1 2, 1 4, 1 5 Therapist:  JONATHAN Bonilla

## 2021-06-09 ENCOUNTER — OFFICE VISIT (OUTPATIENT)
Dept: PSYCHOLOGY | Facility: CLINIC | Age: 76
End: 2021-06-09
Payer: COMMERCIAL

## 2021-06-09 DIAGNOSIS — F33.2 MDD (MAJOR DEPRESSIVE DISORDER), RECURRENT SEVERE, WITHOUT PSYCHOSIS (HCC): Primary | ICD-10-CM

## 2021-06-09 DIAGNOSIS — F41.0 PANIC ATTACKS: ICD-10-CM

## 2021-06-09 DIAGNOSIS — F41.1 GAD (GENERALIZED ANXIETY DISORDER): ICD-10-CM

## 2021-06-09 PROCEDURE — G0177 OPPS/PHP; TRAIN & EDUC SERV: HCPCS

## 2021-06-09 PROCEDURE — G0410 GRP PSYCH PARTIAL HOSP 45-50: HCPCS

## 2021-06-09 PROCEDURE — G0176 OPPS/PHP;ACTIVITY THERAPY: HCPCS

## 2021-06-09 NOTE — PSYCH
This note was not shared with the patient due to this is a psychotherapy note   Subjective:     Patient ID: Cisco Mcbride is a 77 y o  female  [facility] Clinical Progress Notes      Specialized Services Documentation  Therapist must complete separate progress note for each specific clinical activity in which the individual participated during the day  GROUP PSYCHOTHERAPY ([id number]) The group members received a safe and non-judgmental space was offered process time to discuss current stressors and received feedback from the group  Group discussions and themes involved personal disclosures, how to calm a drifting mind and taking control of thoughts  Each member identified ways that they distract themselves from negative or judgement thoughts  Bretttavon Fuchs continues to demonstrate insight through verbal participation in group  Continue with psychotherapy     TX Plan Objectives: 1 1, 1 2, 1 4   Therapist: Antonio Escoto MA, [place]

## 2021-06-09 NOTE — PSYCH
This note was not shared with the patient due to this is a psychotherapy note    Subjective:     Patient ID: Lisbet Milligan is a 77 y o  female  [facility] Clinical Progress Notes      Specialized Services Documentation  Therapist must complete separate progress note for each specific clinical activity in which the individual participated during the day  Group Psychotherapy Life Skills Group (9:30-10:30) Lisbet Milligan actively engaged in group focused on strengths mapping   Clients were instructed to think of a positive experience that they had because they made it positive  Clients shared their experience and identified the things that they did to make it successful  Lisbet Milligan shared the positive experience of planning her granddaughters graduation and identified the six strengths that they used to make it successful   The group discussed why knowing their strengths was important  Group members discussed how they could apply their strengths to a current stressor  Lisbet Milligan continues to make progress towards goals through verbal participation in group; to accomplish long term goals continue to utilize skills learned in programming  Continue with psychotherapy to educate and encourage use of wellness tools  Tx Plan Objective: 1 1,1 2 Therapist: Lindsay Hamilton    Education Therapy   [id number] Lisbet Milligan actively shared in morning assessment and goal review  Presented as Receptive related to readiness to learn  Lisbet Milligan did complete goal from last treatment day identifying gaining responsibility  did not present with any barriers to learning  [id number] Lisbet Milligan engaged throughout the treatment day  Was engaged in learning related to Illness, Medication, Aftercare and Wellness Tools  Staff utilized Verbal, Written, A/V and Demonstration teaching methods    Lisbet Milligan shared area of learning and set a goal for outside of program to organizing her medication  Tx Plan Objective: 1 1,1 2 Therapist:  GEORGE Scott    Case Management Note    GEOREG Scott    Current suicide risk : Low     A case management session is not scheduled today with Maren Gu ; additionally, they did not request a CM meeting  Next scheduled session is 6/10/21  Medications changes/added/denied? No    Treatment session number: 7    Individual Case Management Visit provided today?  No    [facility] follow up physician's orders: None at this time

## 2021-06-09 NOTE — PSYCH
This note was not shared with the patient due to this is a psychotherapy note    Subjective:     Patient ID: Raman Oneil is a 77 y o  female  [facility] Clinical Progress Notes      Specialized Services Documentation  Therapist must complete separate progress note for each specific clinical activity in which the individual participated during the day  Allied Therapy   [id number] Raman Oneil  actively shared in [facility] group focused on practicing and learning music mindfulness and grounding techniques  Raman Oneil engaged in writing a blackout poetry poem, sharing insight, and completing a drawing activity  Group explored practice of music and art techniques, music listening, and poetry writing to assist in feeling grounded  Raman Oneil identified her takeaway from group was utilizing playlists to match harish Gates's facial affect lit up when talking about her personal selections for the playlists  Some effort noted toward treatment goal   Continue AT to encourage the development and practice of music grounding strategies to alleviate symptoms and support wellness      Tx Plan Objective: 1 1, Therapist:  JONATHAN Morgan

## 2021-06-10 ENCOUNTER — OFFICE VISIT (OUTPATIENT)
Dept: PSYCHOLOGY | Facility: CLINIC | Age: 76
End: 2021-06-10
Payer: COMMERCIAL

## 2021-06-10 DIAGNOSIS — F41.1 GAD (GENERALIZED ANXIETY DISORDER): ICD-10-CM

## 2021-06-10 DIAGNOSIS — F41.0 PANIC ATTACKS: ICD-10-CM

## 2021-06-10 DIAGNOSIS — F33.2 MDD (MAJOR DEPRESSIVE DISORDER), RECURRENT SEVERE, WITHOUT PSYCHOSIS (HCC): Primary | ICD-10-CM

## 2021-06-10 PROCEDURE — G0177 OPPS/PHP; TRAIN & EDUC SERV: HCPCS

## 2021-06-10 PROCEDURE — G0176 OPPS/PHP;ACTIVITY THERAPY: HCPCS

## 2021-06-10 PROCEDURE — G0410 GRP PSYCH PARTIAL HOSP 45-50: HCPCS

## 2021-06-10 NOTE — PSYCH
This note was not shared with the patient due to this is a psychotherapy note    Subjective:     Patient ID: Clayton Lagos is a 77 y o  female  [facility] Clinical Progress Notes      Specialized Services Documentation  Therapist must complete separate progress note for each specific clinical activity in which the individual participated during the day  GROUP PSYCHOTHERAPY ([id number]) The group completed a sleep activity that reviewed sleeping soundly suggestions  They explored positive sleep hygiene tips and addressed what to do if they cant sleep or sleep is interrupted  Members received a sleep log to begin to track their sleep habits  Each member engaged in stretches to incorporate into their sleep routine to release tension and sleep easier  Clayton Lagos continues to make progress towards goals through verbal and physical participation in group  Continue with psychotherapy     TX Plan Objectives: 1 1, 1 2, 1 4   Therapist: Nga Sharp MA, [facility], co-led by CASIMIRO Godwin-student

## 2021-06-10 NOTE — PSYCH
This note was not shared with the patient due to this is a psychotherapy note    Subjective:     Patient ID: Shante Walsh is a 77 y o  female  [facility] Clinical Progress Notes      Specialized Services Documentation  Therapist must complete separate progress note for each specific clinical activity in which the individual participated during the day  Group Psychotherapy [id number] Андрей Westbrook was engaged in a group activity utilizing crayons to draw up a B[place] with a line in the middle  With this line each group member focused on writing or drawing what they felt or see as their outer self and how they want to be perceived by others  Then with the other half of the Barrow drawing or writing how they feel inside or their inner self  This was then opened to group discussion on how these two constricting views can impact our own mental health  This group was then guided through a brief mindfulness exercise working on active listening paying attention to each group member sharing  Noticing and paying attention to times where our mind starts to wander  Tx Plan Objective: 1 1, 1 2 Therapist:  GEORGE Powell     Education Therapy   [id number] Shante Walsh actively shared in morning assessment and goal review  Presented as Receptive related to readiness to learn  Shante Walsh did not complete goal from last treatment day identifying hoping to gain and sense of security  did not present with any barriers to learning  [id number] Shante Walsh engaged throughout the treatment day  Was engaged in learning related to Illness, Medication, Aftercare and Wellness Tools  Staff utilized Verbal, Written, A/V and Demonstration teaching methods  Shante Walsh shared area of learning and set a goal for outside of program to track down her insulin package        Tx Plan Objective: 1 1,1 2 Therapist:  GEORGE Powell

## 2021-06-10 NOTE — PSYCH
This note was not shared with the patient due to this is a psychotherapy note    Subjective:     Patient ID: Keila Peterson is a 77 y o  female  [facility] Clinical Progress Notes      Specialized Services Documentation  Therapist must complete separate progress note for each specific clinical activity in which the individual participated during the day  GROUP PSYCHOTHERAPY  ([id number]) Group was facilitated virtually in a private office using HIPAA Compliant and Approved Microsoft Teams  Keila Peterson consented to the use of tele-video modality of treatment and was virtually present for group psychotherapy today  she attentively listened to [facility] share his life story as he co-led this session  Group encouraged power of learning about self, accepting illness and personal responsibility in recovery  Community resources reviewed in addition to personal resources like the affirmations  Progress toward goals noted  Continue psychotherapy to encourage self -awareness and healthy engagement of supports      TX Plan Objectives: 1 1, 1 2, 1 4   Therapist: JONATHAN Hager

## 2021-06-10 NOTE — PSYCH
This note was not shared with the patient due to this is a psychotherapy note    Subjective:     Patient ID: Lucien Delgado is a 77 y o  female  [facility] Clinical Progress Notes      Specialized Services Documentation  Therapist must complete separate progress note for each specific clinical activity in which the individual participated during the day  Case Management Note    GEORGE Dotson    Current suicide risk : Low     2:00-2:15 -  met with Phuong Alanis and updated her treatment plan  Discussed challenges she is facing at home with accomplishing goals  Phuong Alanis stated that she has learned some of the skills but has not worked on them  She stated that she has read about the [employer] technique and liked the idea and hopes to implement it this weekend but her health concerns have been distracting  Reviewed medication appointment schedule for next week  Medications changes/added/denied? No    Treatment session number: 8    Individual Case Management Visit provided today?  Yes     [facility] follow up physician's orders: none at this time

## 2021-06-10 NOTE — PSYCH
Assessment/Plan:      1  MDD (major depressive disorder), recurrent severe, without psychosis ([facility] [address] )      2  Panic attacks      3  MADAN (generalized anxiety disorder)            Subjective:      Patient ID: Maren Gu is a 77 y o  female      [facility] Treatment Plan   AREAS OF NEED: Depression and anxiety as evidenced by somatic symptoms,feelings of guilt,sadness, afraid of the future, not putting herself first,upset, due to pressures in her personal relationship, guilt related to her mother's end of life care, thinking about the future  Date Initiated: 05/28/21     Strengths: "Lived a long life, responsible,smart, and likes to challenge herself  "        LONG TERM GOAL:   Date Initiated: 05/28/21  1 0 I will identify and share three ways in which my day-to-day functioning has improved since attending program    Target Date: 6/25/21  Completion Date:         SHORT TERM OBJECTIVES:      Date Initiated: 05/28/21  1 1 I will learn two new self-motivation skills and share the challenges or successes daily  Revision Date: 06/10/21  Target Date: 6/10/21   Completion Date:      Date Initiated: 05/28/21  1 2 I will learn and practice daily the МАРИНА technique for improving time management and prioritizing tasks  Revision Date: 6/10/21  Target Date: 6/10/21  Completion Date:     Date Initiated: 05/28/21  1 3 I will take medications as prescribed and share questions and concerns if arise  Revision Date:6/10/21  Target Date: 6/10/21  Completion Date:      Date Initiated: 05/28/21  1 4 I will identify 3 ways my supports can assist in my recovery and agree to staff/support contact as indicated  Revision Date:6/10/21  Target Date: 6/10/21  Completion Date:            7 DAY REVISION:   1 5 I will identify and record three mindfulness and/or self-monitoring strategies that I can utilize on a daily basis to track my moods    Date Initiated:6/10/21  Revision Date:   Target Date: 6/22/21  Completion Date:        PSYCHIATRY:  Date Initiated:  05/28/21  Medication Management and Education       Revision Date: 06/10/21   1 3 Continue medication management          The person(s) responsible for carrying out the plan is Maria Ines Hutchison MD     NURSING:   Date Initiated: 05/28/21  1 1,1 2,1 3,1 4 This RN will provide daily wellness group five days weekly to educate Jarred Quick on S/S of her diagnoses and medications used in treatment  Revision Date: 06/10/21   1 1,1 2,1 3,1 4,1 5 Continue to encourage Jarred Quick to participate in wellness groups daily to learn about symptoms, coping strategies and warning signs to promote relapse prevention  The person(s) responsible for carrying out the plan is Ru Wolf RN     PSYCHOLOGY:   Date Initiated: 05/28/21       1 1, 1 2, 1 4 Provide psychotherapy group 5 times per week to allow opportunity for Jarred Quick  to explore stressors and ways of coping  Revision Date: 06/10/21   1 1,1 2,1 4,1 5  Continue to provide psychotherapy group daily to Jarred Quick and encourage sharing of stressors, skills and positive change  The person(s) responsible for carrying out the plan is Olegario Warner MA, [facility] [place] [place]; Cely Fitzgerald MSW,LSW     ALLIED THERAPY:   Date Initiated: 05/28/21  1 1,1 2 Engage Jarred Quick in AT group 5 times daily to encourage development and use of wellness tools to decrease symptoms and promote recovery through meaningful activity  Revision Date: 06/10/21   1 1,1 2,1 5 Continue to engage Jarred Quick to participate in AT group to practice wellness tools within program and transfer to home sharing successes and barriers through healthy task involvement          The person(s) responsible for carrying out the plan is JONATHAN Marr      CASE MANAGEMENT:   Date Initiated: 05/28/21      1 0 This  will meet with Jarred Quick  3-4 times weekly to assess treatment progress, discharge planning, connection to community supports and UR as indicated  Revision Date: 06/10/21   1 0 Continue to meet with Adelita Garcia 3-4 times weekly to assess growth, work toward goals, continued treatment needs, dc planning and use of supports  The person(s) responsible for carrying out the plan is Zoraida Herrera     TREATMENT REVIEW/COMMENTS:      DISCHARGE CRITERIA: Identify 3 signs of progress and complete relapse prevention plan  DISCHARGE PLAN: Connect with identified outpatient providers     Estimated Length of Stay: 10 treatment days          Diagnosis and Treatment Plan explained to Chelsy Simon relates understanding diagnosis and is agreeable to Treatment Plan             CLIENT COMMENTS / Please share your thoughts, feelings, need and/or experiences regarding your treatment plan: _____________________________________________________________________________________________________________________________________________________________________________________________________________________________________________________________________________________________________________________ Date/Time: ______________      Patient Signature: _________________________________      Date/Time: ______________       Signature: _________________________________      Date/Time: ______________

## 2021-06-11 ENCOUNTER — OFFICE VISIT (OUTPATIENT)
Dept: PSYCHOLOGY | Facility: CLINIC | Age: 76
End: 2021-06-11
Payer: COMMERCIAL

## 2021-06-11 DIAGNOSIS — F33.2 MDD (MAJOR DEPRESSIVE DISORDER), RECURRENT SEVERE, WITHOUT PSYCHOSIS (HCC): Primary | ICD-10-CM

## 2021-06-11 DIAGNOSIS — F41.0 PANIC ATTACKS: ICD-10-CM

## 2021-06-11 DIAGNOSIS — F41.1 GAD (GENERALIZED ANXIETY DISORDER): ICD-10-CM

## 2021-06-11 PROCEDURE — G0410 GRP PSYCH PARTIAL HOSP 45-50: HCPCS

## 2021-06-11 PROCEDURE — G0177 OPPS/PHP; TRAIN & EDUC SERV: HCPCS

## 2021-06-11 PROCEDURE — G0176 OPPS/PHP;ACTIVITY THERAPY: HCPCS

## 2021-06-11 NOTE — PSYCH
This note was not shared with the patient due to this is a psychotherapy note    Subjective:     Patient ID: Sonia Roberson is a 77 y o  female  [facility] Clinical Progress Notes      Specialized Services Documentation  Therapist must complete separate progress note for each specific clinical activity in which the individual participated during the day  Group Psychotherapy Life Skills Group (9:30-10:30)  Group members discussed the importance of mindfulness and talking about a time when they were mindful  Group members played the mindfulness game which consisted of true/false statements, visualization cards, and mindfulness practice cards  Group members engaged in mindfulness activities  Sonia Roberson continues to make progress towards goals through verbal participation in group; to accomplish long term goals continue to utilize skills learned in programming  Continue with psychotherapy to educate and encourage use of wellness tools  Tx Plan Objective: 1 1,1 2 Therapist: Lizzie Haley    Education Therapy   [id number] Sonia Roberson actively shared in morning assessment and goal review  Presented as Receptive related to readiness to learn  Sonia Roberson did complete goal from last treatment day identifying gaining hope  did not present with any barriers to learning  Tx Plan Objective: 1 1,1 2 Therapist:  GEORGE Moore    Case Management Note    GEORGE Moore    Current suicide risk : Low     A case management session is not scheduled today with Sonia Roberson ; additionally, they did not request a CM meeting  Next scheduled session is 6/14/21  Medications changes/added/denied? No    Treatment session number: 9    Individual Case Management Visit provided today?  No    [facility] follow up physician's orders: None at this time

## 2021-06-11 NOTE — PSYCH
This note was not shared with the patient due to this is a psychotherapy note    Subjective:     Patient ID: Adelita Garcia is a 77 y o  female  [facility] Clinical Progress Notes      Specialized Services Documentation  Therapist must complete separate progress note for each specific clinical activity in which the individual participated during the day  Allied Therapy   [id number] Adelita Garcia actively shared in [facility] group focused on how to live out of our balanced mind and identifying signs that our brain is being unbalanced (utilizing more of the reasoning or emotional side)  Adelita Garcia engaged in group by listening to music from an emotional mind perspective vs  Reasoning perspective and learning how to analyze a situation from a balance mind perspective  She also processed how her mind is unbalanced and what steps they can take to become more balanced  Group explored practice of active song listening, group processing and discussion, and journaling  Adelita Garcia  identified a time when she acted impulsively with her overemotional side  She also processed that remaining in her balanced mind means being more aware of the activities she engages in (I e  watching news triggers more anxiety and emotions for her)  Some effort noted toward treatment goal   Continue AT to encourage the development and practice of mindfulness strategies to alleviate symptoms and support wellness    Tx Objectives: 1 1, 1 2 & 1 4 Therapist: JONATHAN Kiser

## 2021-06-11 NOTE — PSYCH
This note was not shared with the patient due to this is a psychotherapy note       Subjective:     Patient ID: Scott Robles is a 77 y o  female  [facility] Clinical Progress Notes      Specialized Services Documentation  Therapist must complete separate progress note for each specific clinical activity in which the individual participated during the day  Group Psychotherapy   [id number]  Scott Robles  actively shared in psychotherapy group focused on WRAP development and use  Group explored the benefits of WRAP development and strategies to using this tool to support ongoing recovery  Angela Johnston engaged when prompted and offered suggestions for her own WRAP  Some progress noted toward goal   Continue psychotherapy to explore recovery strategies and personal application  Tx Plan Objective: 1 1, Therapist:  Melina CASTILLO       Education Therapy     [phone] Scott Robles engaged throughout the treatment day  Was engaged in learning related to Illness, Aftercare and Wellness Tools  Staff utilized Verbal, Written and Demonstration teaching methods  Scott Robles shared area of learning and set a goal for outside of program to work on organizing        Tx Plan Objective: 1 1, Therapist:  Melina CASTILLO

## 2021-06-14 ENCOUNTER — OFFICE VISIT (OUTPATIENT)
Dept: PSYCHOLOGY | Facility: CLINIC | Age: 76
End: 2021-06-14
Payer: COMMERCIAL

## 2021-06-14 ENCOUNTER — DOCUMENTATION (OUTPATIENT)
Dept: PSYCHOLOGY | Facility: CLINIC | Age: 76
End: 2021-06-14

## 2021-06-14 ENCOUNTER — OFFICE VISIT (OUTPATIENT)
Dept: PSYCHIATRY | Facility: CLINIC | Age: 76
End: 2021-06-14
Payer: COMMERCIAL

## 2021-06-14 DIAGNOSIS — F41.0 PANIC ATTACKS: ICD-10-CM

## 2021-06-14 DIAGNOSIS — F41.1 GAD (GENERALIZED ANXIETY DISORDER): ICD-10-CM

## 2021-06-14 DIAGNOSIS — F33.2 MDD (MAJOR DEPRESSIVE DISORDER), RECURRENT SEVERE, WITHOUT PSYCHOSIS (HCC): ICD-10-CM

## 2021-06-14 DIAGNOSIS — F33.2 MDD (MAJOR DEPRESSIVE DISORDER), RECURRENT SEVERE, WITHOUT PSYCHOSIS (HCC): Primary | ICD-10-CM

## 2021-06-14 DIAGNOSIS — F41.1 GAD (GENERALIZED ANXIETY DISORDER): Primary | ICD-10-CM

## 2021-06-14 PROCEDURE — 1036F TOBACCO NON-USER: CPT | Performed by: NURSE PRACTITIONER

## 2021-06-14 PROCEDURE — 99214 OFFICE O/P EST MOD 30 MIN: CPT | Performed by: NURSE PRACTITIONER

## 2021-06-14 PROCEDURE — G0176 OPPS/PHP;ACTIVITY THERAPY: HCPCS | Performed by: NURSE PRACTITIONER

## 2021-06-14 PROCEDURE — 1160F RVW MEDS BY RX/DR IN RCRD: CPT | Performed by: NURSE PRACTITIONER

## 2021-06-14 PROCEDURE — G0410 GRP PSYCH PARTIAL HOSP 45-50: HCPCS | Performed by: NURSE PRACTITIONER

## 2021-06-14 PROCEDURE — G0177 OPPS/PHP; TRAIN & EDUC SERV: HCPCS | Performed by: NURSE PRACTITIONER

## 2021-06-14 NOTE — PSYCH
[facility] Clinical Progress Notes      Specialized Services Documentation  Therapist must complete separate progress note for each specific clinical activity in which the individual participated during the day         [facility] follow up physician's orders:   DATE 6/14/2021  TIME 9:21 AM  DISCHARGE TODAY  Dayana Hair MD

## 2021-06-14 NOTE — PSYCH
PHP MEDICATION MANAGEMENT NOTE        [facility]    Name and Date of Birth: Taylor Redd 77 y o  1945 MRN: [id number]    Date of Visit: June 14, 2021    Allergies   Allergen Reactions    Molds & Smuts Allergic Rhinitis    Other Allergic Rhinitis     RAGWEED, CAT DANDER, DOG DANDER     Pollen Extract Other (See Comments)     Cold symptoms       SUBJECTIVE:    Julianne Bartlett is seen today for a follow up for Major Depressive Disorder and Generalized Anxiety Disorder  She reports that she has decompensated slightly since the last visit  Patient reports she had a difficult weekend  Is experiencing severe neck pain  Spent much of the weekend lying on the couch  States that she has not been taking Lexapro over regularly  States that makes her shaking gives her headache  Patient plans to trial Lexapro at bedtime tonight to see if this alleviates side effects  Patient has planned discharge this week  Plans to follow up with her regular psychiatrist Dr Kaitlynn Schilling Friday of this week        PLAN:    Trial Lexapro at bedtime    Continue Xanax as needed   planned discharge this week and follow up with Dr Kaitlynn Schilling on Friday  Aware of 24 hour and weekend coverage for urgent situations accessed by calling [facility] main practice number  Continue partial hospitalization program    Diagnoses and all orders for this visit:    MADAN (generalized anxiety disorder)    MDD (major depressive disorder), recurrent severe, without psychosis ([facility] [address] )        Current Outpatient Medications on File Prior to Visit   Medication Sig Dispense Refill    albuterol (2 5 mg/3 mL) 0 083 % nebulizer solution Take 3 mL (2 5 mg total) by nebulization every 6 (six) hours as needed for wheezing 75 mL 0    albuterol (PROAIR HFA) 90 mcg/act inhaler Inhale 2 puffs every 6 (six) hours as needed       ALPRAZolam (XANAX) 0 5 mg tablet Take 1 tablet (0 5 mg total) by mouth 2 (two) times a day as needed for anxiety 60 tablet 1    aspirin 81 MG tablet Take 81 mg by mouth daily      B-D UF III MINI PEN NEEDLES 31G X 5 MM MISC       BAQSIMI TWO PACK 3 MG/DOSE POWD Use as directed  0    betamethasone dipropionate 0 05 % lotion       cholecalciferol (VITAMIN D3) 1,000 units tablet Take 2,000 Units by mouth daily      cyclobenzaprine (FLEXERIL) 5 mg tablet Take 1 tablet (5 mg total) by mouth daily at bedtime for 14 days 14 tablet 0    diclofenac sodium (VOLTAREN) 1 % Apply 2 g topically 4 (four) times a day (Patient not taking: Reported on 4/16/2021) 1 Tube 0    diltiazem (TIAZAC) 300 MG 24 hr capsule Take 300 mg by mouth daily      docusate sodium (COLACE) 100 mg capsule Take 1 capsule (100 mg total) by mouth 2 (two) times a day 20 capsule 0    enalapril (VASOTEC) 20 mg tablet Take 20 mg by mouth daily      escitalopram (LEXAPRO) 10 mg tablet Take 5mg (1/2 tab) daily  If well tolerated after 1-2 weeks increase to 10mg  30 tablet 0    insulin aspart (NovoLOG) 100 units/mL injection Inject 12 Units under the skin 3 (three) times a day before meals (Patient taking differently: Inject under the skin 3 (three) times a day before meals )  0    Insulin Disposable Pump (OmniPod Dash 5 Pack Pods) MISC USE 1 POD EVERY 1 5 DAYS      Insulin Disposable Pump (OMNIPOD DASH 5 PACK) MISC CHANGE PODS EVERY 2 DAYS UTD  3    isosorbide mononitrate (IMDUR) 60 mg 24 hr tablet Take 60 mg by mouth daily at bedtime      levothyroxine 50 mcg tablet Take 50 mcg by mouth daily      nitroglycerin (NITROLINGUAL) 0 4 mg/spray spray USE ONE SPRAY Q 5 MINUTES AS NEEDED FOR CHEST PAIN  IF NO RELIEF, CALL 911   1    rosuvastatin (CRESTOR) 20 MG tablet Take 20 mg by mouth every evening  2    simethicone (MYLICON,GAS-X) 015 MG capsule Take 1 capsule (180 mg total) by mouth every 12 (twelve) hours 20 capsule 0     No current facility-administered medications on file prior to visit         Psychotherapy Provided: Individual psychotherapy provided: Yes  Counseling was provided during the session today for 16 minutes  Supportive counseling provided  HPI ROS Appetite Changes and Sleep:     She reports normal sleep, normal appetite, decreased energy   Patient denies suicidal or homicidal ideation    Review Of Systems:      General emotional problems and decreased functioning   Personality no change in personality   Constitutional negative   ENT negative   Cardiovascular negative   Respiratory negative   Gastrointestinal negative   Genitourinary negative   Musculoskeletal negative   Integumentary negative   Neurological negative   Endocrine negative   Other Symptoms none, all other systems are negative     Mental Status Evaluation:    Appearance Adequate hygiene and grooming   Behavior cooperative   Mood anxious  Depression Scale -  of 10 (0 = No depression)  Anxiety Scale -  of 10 (0 = No anxiety)   Speech Normal rate and volume   Affect appropriate and mood-congruent   Thought Processes Goal directed and coherent   Thought Content Does not verbalize delusional material   Associations Tightly connected   Perceptual Disturbances Denies hallucinations and does not appear to be responding to internal stimuli   Risk Potential Suicidal/Homicidal Ideation - No evidence of suicidal or homicidal ideation and Patient does not verbalize suicidal or homicidal ideation  Risk of Violence - No evidence of risk for violence found on assessment  Risk of Self Mutilation - No evidence of risk for self mutilation found on assessment   Orientation oriented to person, place, time/date and situation   Memory recent and remote memory grossly intact   Consciousness alert and awake   Attention/Concentration attention span and concentration are age appropriate   Insight fair   Judgement fair   Muscle Strength and Gait normal muscle strength and normal muscle tone, normal gait/station and normal balance   Motor Activity no abnormal movements Language no difficulty naming common objects, no difficulty repeating a phrase, no difficulty writing a sentence   Fund of Knowledge adequate knowledge of current events  adequate fund of knowledge regarding past history  adequate fund of knowledge regarding vocabulary      Past Psychiatric History Update:     Inpatient Psychiatric Admission Since Last Encounter:   no  Suicide Attempt Or Self Mutilation Since Last Encounter:   no  Incidence of Violent Behavior Since Last Encounter:   no    Traumatic History Update:     New Onset of Abuse Since Last Encounter:   no  Traumatic Events Since Last Encounter:   no    Past Medical History:    Past Medical History:   Diagnosis Date    Acute embolism and thrombosis of unspecified deep veins of unspecified lower extremity ([facility] [address] )     Last Assessed:  5/18/17    Anemia     Anosmia     Anxiety     Arthritis     Asthma     Back pain     Bilateral macular retinal edema     CAD (coronary artery disease)     Cataract     Cervical disc herniation     Cervical radiculopathy     Cervical spinal stenosis     Cervical spondylolysis     Chronic mastoiditis     Complex endometrial hyperplasia     Depression     Diabetes mellitus ([address] )     DVT (deep venous thrombosis) ([facility] [address] )     Fibromyalgia     Hyperlipidemia     Hypertension     Hypothyroid     Lumbar radiculopathy     Neck pain     Obese     Spinal stenosis     Stomach ulcer     Thyroid disease      No past medical history pertinent negatives    Past Surgical History:   Procedure Laterality Date    BACK SURGERY      CARPAL TUNNEL RELEASE      CATARACT EXTRACTION      CHOLECYSTECTOMY      COLONOSCOPY      CORONARY ANGIOPLASTY      CORONARY ARTERY BYPASS GRAFT      CYSTOSCOPY N/A 6/20/2017    Procedure: Evangelistarupinder Wong;  Surgeon: Bhaskar Wheeler MD;  [facility]: [facility];  Service: Gynecology Oncology    KS LAPAROSCOPY W TOT HYSTERECTUTERUS <=250 GRAM  W TUBE/OVARY N/A 6/20/2017    Procedure: ROBOTIC HYSTERECTOMY; BILATERAL SALPINO-OOPHERECTOMY; umbilical hernia repair ;  Surgeon: Austin Randhawa MD;  [facility];  Service: Gynecology Oncology    TONSILECTOMY AND ADNOIDECTOMY      UMBILICAL HERNIA REPAIR       Allergies   Allergen Reactions    Molds & Smuts Allergic Rhinitis    Other Allergic Rhinitis     RAGWEED, CAT DANDER, DOG DANDER     Pollen Extract Other (See Comments)     Cold symptoms       Substance Abuse History:    Social History     Substance and Sexual Activity   Alcohol Use No     Social History     Substance and Sexual Activity   Drug Use No     Social History:    Social History     Socioeconomic History    Marital status: /Civil Union     Spouse name: Not on file    Number of children: 2    Years of education: High school or GED    Highest education level: Not on file   Occupational History    Occupation: Retired   Tobacco Use    Smoking status: Former Smoker     Packs/day: 1 00     Years: 6 00     Pack years: 6 00     Types: Cigarettes     Quit date:      Years since quittin 4    Smokeless tobacco: Never Used    Tobacco comment: Smoked about a half a pack for about 4 yrs  Quite about 50 yrs ago  Vaping Use    Vaping Use: Never used   Substance and Sexual Activity    Alcohol use: No    Drug use: No    Sexual activity: Never     Comment: No known STD risk factors   Other Topics Concern    Not on file   Social History Narrative    Lives independently with spouse    No known risk factors    Denied:  Exercising regularly     Social Determinants of Health     Financial Resource Strain:     Difficulty of Paying Living Expenses:    Food Insecurity:     Worried About Running Out of Food in the Last Year:     [address] in the Last Year:    Transportation Needs:     Lack of Transportation (Medical):      Lack of Transportation (Non-Medical):    Physical Activity:     Days of Exercise per Week:     Minutes of Exercise per Session:    Stress:     Feeling of Stress :    Social Connections:     Frequency of Communication with Friends and Family:     Frequency of Social Gatherings with Friends and Family:     Attends [employer] Services:     Active Member of Clubs or Organizations:     Attends Club or Organization Meetings:     Marital Status:    Intimate Partner Violence:     Fear of Current or Ex-Partner:     Emotionally Abused:     Physically Abused:     Sexually Abused:      Family Psychiatric History:     Family History   Problem Relation Age of Onset    Arthritis Mother     Leukemia Mother     Other Mother         Anxiety, major depressive disorder, recurrent episode with atypical features    Coronary artery disease Father         Heart problem    Diabetes Father     Other Father         Infectious disease    Alzheimer's disease Maternal Grandmother     Other Maternal Grandfather         Heart problem    Other Daughter         Anxiety, major depressive disorder, recurrent episode with atypical features    Alcohol abuse Other         Grandparent    Cancer Family     Diabetes Family     Hypertension Family     Other Family         Reported prior back trouble, thyroid disorder     History Review: The following portions of the patient's history were reviewed and updated as appropriate: allergies, current medications, past family history, past medical history, past social history, past surgical history and problem list     OBJECTIVE:     Vital signs in last 24 hours: There were no vitals filed for this visit  Laboratory Results: I have personally reviewed all pertinent laboratory/tests results  Medications Risks/Benefits:      Risks, Benefits And Possible Side Effects Of Medications:    Discussed risks and benefits of treatment with patient including risk of suicidality, serotonin syndrome and SIADH related to treatment with antidepressants;  Risk of induction of manic symptoms in certain patient populations and risks of misuse, abuse or dependence, sedation and respiratory depression related to treatment with benzodiazepine medications     Controlled Medication Discussion:     Maria G Almendarez has been filling controlled prescriptions on time as prescribed according to Sara 799 06/14/21    This note was shared with patient

## 2021-06-14 NOTE — PSYCH
This note was not shared with the patient due to this is a psychotherapy note  Subjective:     Patient ID: Evelyn Gonzalez is a 77 y o  female  [facility] Clinical Progress Notes      Specialized Services Documentation  Therapist must complete separate progress note for each specific clinical activity in which the individual participated during the day  Group Psychotherapy [id number] Naya Duque was engaged in a psychoeducation group focused on setting appropriate boundaries to improve overall wellness and to achieve more internal happiness  A brief mary lou talk speaker started the group followed by a personal boundary worksheet  Group discussed different types of boundaries as followed: Porous Boundaries, Healthy Boundaries, and Rigid Boundaries  Group then engaged in open discussion on areas were they can improve boundaries and also apply mindfulness while communicating their new set of boundaries to their loved ones or friends  Tx Plan Objective: 1 1,1 2 Therapist:  GEORGE Lane      Education Therapy   [id number] Evelyn Gonzalez actively shared in morning assessment and goal review  Presented as Receptive related to readiness to learn  Evelyn Gonzalez did not complete goal from last treatment day identifying hoping to gaining more awareness around the [facility] skills  did not present with any barriers to learning  [id number] Evelyn Gonzalez engaged throughout the treatment day  Was engaged in learning related to Illness, Medication, Aftercare and Wellness Tools  Staff utilized Verbal, Written, A/V and Demonstration teaching methods  Evelyn Gonzalez shared area of learning and set a goal for outside of program to not wake up her dog as today was her last day of treatment        Tx Plan Objective: 1 1,1 2 Therapist:  GEORGE Lane

## 2021-06-14 NOTE — PROGRESS NOTES
[facility] Discharge Instructions:   Disposition: home  Address: [address] [place] [id number]    Diagnosis:   MDD (major depressive disorder), recurrent severe, without psychosis ([address] )     Panic attacks     MADAN (generalized anxiety disorder)    Allergies (Drug/Food): Allergies   Allergen Reactions    Molds & Smuts Allergic Rhinitis    Other Allergic Rhinitis     RAGWEED, CAT DANDER, DOG DANDER     Pollen Extract Other (See Comments)     Cold symptoms       Activity: none  Diet:no recommendations  Smoking Cessation:not a smoker   Diagnostic/Laboratory Orders: none  Vaccines: If you received a vaccine, please notify your family physician on your next visit  For more information, please call [phone]  Follow-up appointments/Referrals:    Medication Management:   Genaro Washburn  [address]  [address] [facility] [address] [place] [address], [address] [place] [address]  [phone]  Follow up: 21 at 11:00     Outpatient Therapy:   Barbara Davis  [address] [facility], [address] [facility]   Follow up: 21 at 3:30    Primary Care Physician:   Edwin Trujillo, [address]  [address] [facility] [address]   N) [phone] (u) [phone]   ICM/CTT:none    Keep Follow Up Appointments, Take Medication As Prescribed, Utilize WRAP, Consider Support Groups and/or [facility]: [facility] [phone]  Intake/Referral/Evaluation (Non-Emergency) *NON INSURED FOR FUNDIN[address] [place] [address]: [phone], [facility]: [phone], [facility]: [phone] and [place]: [phone]  Crisis Intervention (Emergency) [place] [facility]: [address] [place] [address]: [phone], [place]: [phone], [address]: [phone], [facility]): [phone], Adarsh [place]: [phone] and C/[facility]/P: [phone]  _________________________________  National Crisis Intervention Hotline: 2-808.343.1005    National Suicide Crisis [facility]: [phone]  I, the undersigned, have received and understand the above instructions          Patient/Rep Signature: __________________________________       Date/Time: ______________         Relationship: __________________________________________       Date/Time: ______________         Physician Signature: ____________________________________      Date/Time: ______________               Signature: ________________________________       Date/Time: ______________

## 2021-06-14 NOTE — PSYCH
This note was not shared with the patient due to this is a psychotherapy note   Subjective:     Patient ID: Yudi Robles is a 77 y o  female  [facility] Clinical Progress Notes      Specialized Services Documentation  Therapist must complete separate progress note for each specific clinical activity in which the individual participated during the day  GROUP PSYCHOTHERAPY ([id number]) The group was educated on the grounding technique called Emotional Freedom Tapping (EFT)  Members practiced EFT by following along to a prompt titled "From Anger to Peace"  Members rated the intensity of their thoughts before and after participating in the tapping exercise  Group discussed scenarios where they could use the technique, how to adapt the practice to their needs and understanding insights from past  Jaron Molina continues to make progress towards goals through verbal participation in group  Continue with Psychotherapy     TX Plan Objectives: 1 1, 1 2, 1 4   Therapist: Jammie Urrutia MA, [place]

## 2021-06-14 NOTE — PSYCH
This note was not shared with the patient due to this is a psychotherapy note    Subjective:     Patient ID: Maria Esther Muir is a 77 y o  female  [facility] Clinical Progress Notes      Specialized Services Documentation  Therapist must complete separate progress note for each specific clinical activity in which the individual participated during the day  Allied Therapy   [id number] Maria Esther Muir actively shared in [facility] group focused on using ACT metaphors and visualizations to decrease the intensity of anxiety and negative thoughts  Maria Esther Muir engaged in listening to music, processing, and practicing visualization techniques  Group explored practice of utilizing 3 visualization techniques as well as analyzing what they viewed as effective and how they would implement the practice in the outside world  Maria Esther Muir identified the train visualization was the most effective for her as it allowed her to see her negative thoughts from a different perspective and helped depersonalized them  This process allowed her to notice if the negative thought was helpful or unhealthy  Some effort noted toward treatment goal   Continue AT to encourage the development and practice of mindfulness strategies to alleviate symptoms and support wellness    Tx Plan Objective: 1 1, Therapist:  JONATHAN Morgan

## 2021-06-14 NOTE — PSYCH
This note was not shared with the patient due to this is a psychotherapy note    Subjective:     Patient ID: Nikita Carballo is a 77 y o  female  [facility] Clinical Progress Notes      Specialized Services Documentation  Therapist must complete separate progress note for each specific clinical activity in which the individual participated during the day  Case Management Note    Darin Mata MSW, LSW    Current suicide risk : Low     (12:00-12:30) CM reviewed Relapse Prevention Plan, discharge instructions, medication list and crisis information with Nikita Carballo  See discharge summary for treatment details  Aftercare providers to receive discharge summary  Medications changes/added/denied? No    Treatment session number: 10    Individual Case Management Visit provided today?  Yes     [facility] follow up physician's orders: Discharge - See Dr Josefina Cadena 06/14/21  TIME 10:08 AM  Rina Longoria MD

## 2021-06-14 NOTE — PSYCH
Assessment/Plan:      1  MDD (major depressive disorder), recurrent severe, without psychosis ([address] )      2  Panic attacks      3  MADAN (generalized anxiety disorder)            Subjective:      Patient Cedrick Brought a 77 y  o  female      [facility] Plan   AREAS OF NEED: Depression and anxiety as evidenced by somatic symptoms,feelings of guilt,sadness, afraid of the future, not putting herself first,upset, due to pressures in her personal relationship, guilt related to her mother's end of life care, thinking about the future  Date Initiated: 05/28/21     Strengths: "Lived a long life, responsible,smart, and likes to challenge herself  "        LONG TERM GOAL:   Date Initiated: 05/28/21  1 0 I will identify and share three ways in which my day-to-day functioning has improved since attending program    Target Date: 6/25/21  Completion Date:  06/14/21- discharge        SHORT TERM OBJECTIVES:      Date Initiated: 05/28/21  1 1 I will learn two new self-motivation skills and share the challenges or successes daily     Revision Date: 06/10/21 06/14/21- discharge  Target Date: 6/10/21   Completion Date: 06/14/21- discharge     Date Initiated: 05/28/21  1 2 I will learn and practice daily the МАРИНА technique for improving time management and prioritizing tasks    Revision Date: 6/10/21 06/14/21- discharge  Target Date: 6/10/21  Completion Date: 06/14/21- discharge     Date Initiated: 05/28/21  1 3 I will take medications as prescribed and share questions and concerns if arise     Revision Date:6/10/21 06/14/21- discharge  Target Date: 6/10/21  Completion Date: 06/14/21- discharge     Date Initiated: 05/28/21  1 4 I will identify 3 ways my supports can assist in my recovery and agree to staff/support contact as indicated     Revision Date:6/10/21 06/14/21- discharge  Target Date: 6/10/21  Completion Date: 06/14/21- discharge            7 DAY REVISION:   1 5 I will identify and record three mindfulness and/or self-monitoring strategies that I can utilize on a daily basis to track my moods  Date Initiated:6/10/21  Revision Date: 06/14/21- discharge  Target Date: 6/22/21  Completion Date: 06/14/21- discharge        PSYCHIATRY:  Date Initiated:  05/28/21  Medication Management and Education       Revision Date: 06/10/21 06/14/21- discharge  1 3 Continue medication management            The person(s) responsible for carrying out the plan is Soni Alberto MD     NURSING:   Date Initiated: 05/28/21  1 1,1 2,1 3,1 4 This RN will provide daily wellness group five days weekly to [employer] of her diagnoses and medications used in treatment  Revision Date: 06/10/21 06/14/21- discharge  1 1,1 2,1 3,1 4,1 5 Continue to encourage Valentina Tripp to participate in wellness groups daily to learn about symptoms, coping strategies and warning signs to promote relapse prevention      The person(s) responsible for carrying out the plan is Hugo Aguiar RN     PSYCHOLOGY:   Date Initiated: 05/28/21       1 1, 1 2, 1 4 Provide psychotherapy group 5 times per week to allow opportunity for Yajaira Marsh  to explore stressors and ways of coping  Revision Date: 06/10/21 06/14/21- discharge  1 1,1 2,1 4,1 5  Continue to provide psychotherapy group daily to Valentina Tripp and encourage sharing of stressors, skills and positive change      The person(s) responsible for carrying out the plan is Nick Elliott MA, [facility]; Alayna Finn MSW,LSW     ALLIED THERAPY:   Date Initiated: 05/28/21  1 1,1 2 Connie Kurtz AT group 5 times daily to encourage development and use of wellness tools to decrease symptoms and promote recovery through meaningful activity    Revision Date: 06/10/21 06/14/21- discharge  1 1,1 2,1 5 Continue to engage Valentina Tripp to participate in AT group to practice wellness tools within program and transfer to home sharing successes and barriers through healthy task involvement          The person(s) responsible for carrying out the plan is JONATHAN Powell      CASE MANAGEMENT:   Date Initiated: 05/28/21      1 0 This  will meet with Dinora Reed times weekly to assess treatment progress, discharge planning, connection to community supports and UR as indicated  Revision Date: 06/10/21 06/14/21- discharge  1 0 Continue to meet with Dalton Bernstein 3-4 times weekly to assess growth, work toward goals, continued treatment needs, dc planning and use of supports      The person(s) responsible for carrying out the plan is Marleny Ny     TREATMENT REVIEW/COMMENTS:      DISCHARGE CRITERIA: Identify 3 signs of progress and complete relapse prevention plan     DISCHARGE PLAN: Connect with identified outpatient providers     Estimated Length of Stay: 10 treatment days          Diagnosis and Treatment Plan explained to Yajaira Gates relates understanding diagnosis and is agreeable to Treatment Plan

## 2021-06-14 NOTE — PSYCH
This note was not shared with the patient due to this is a psychotherapy note    Subjective:     Patient ID: Raman Oneil is a 77 y o  female  [facility] Discharge Summary:   Admission Date: 05/28/21  Patient was referred by Dr Luc Duran III  Discharge Date: 06/14/21  Was this a routine discharge? yes   Diagnosis: Axis I:   1  MDD (major depressive disorder), recurrent severe, without psychosis ([address] )     2  Panic attacks     3  MADAN (generalized anxiety disorder)        Treating Physician: Dr Ya Simms MD    Treatment Complications: While in program [place] learned coping skills and different techniques she could implement and apply to her situation  [place] has found it challenging to apply the skills she has learned while in program due her medical condition  Presenting Need:   As per Dr Luc Duran: JONATHAN Ewing a 77 y  o  female with depression and anxiety referred because of ongoing depression and anxiety that has not been resolving and significantly due to a lot of psychosocial and historic psychological stressors  She has desired better coping skills and ways to address her anxiety   Some days feels very low  "I got to somehow get motivated"  Helping granddaughter go to work helps her feel better, and useful  Finances, health, 's health, possible move are recent stressors       Medications have not been effective  Due to efficacy issues and kidney function, she is ready to transition from cymbalta  She has been taking cymbalta 30mg daily for the last 2 weeks       BP elevated today, but she says she is thinking about her dog (at vet for teeth cleaning) and anxious today   No CP, SOB, or other symptoms suggesting of acute cardiac concern        Onset of symptoms was in her 30s/40s predominately, and it has been a fluctuating course with depression and anxiety more constant since that time    Stressors: husbands health/Msk, possible move considered, Financial uncertainty, death of mother last year in a nursing facility, her own health issues, cannot do hobbies she used to like (hands, vision, etc)     HPI ROS:  Medication Side Effects: no  Depression: 7-8 /10 (10 worst)  Anxiety: 7-8 /10 (10 worst)  Safety: no  Sleep: hypersomnia, but hard time falling asleep  10hr/day sleep  Energy: low  Appetite: overall ok  Weight Change: no        As per this writer: Taylor Redd is a 77 y o  female using she/her pronouns referred to [facility] via Curly Sarabia due to ongoing depression and anxiety that has not been resolving and significantly due to a lot of psychosocial and historic psychological stressors  Carlos Manuel Roberson has had a history of anxiety and rayo Barriosara's depression and anxiety has gotten worse due to pressures in her personal relationship, guilt related to her mother's end of life care, thinking about the future  Julianne Bartlett has feelings of guilt related to her mothers end of life care stating that due to covid she was unable to go visit her mom in the nursing home and did not realize that her mom lost an additional 20 pounds which put her at weighing 65lbs at the time of her passing  Julianne Bartlett has been  to her  for 47 years and they love each other but she states that her relationship with her  as been a little marlyn  Julianne Bartlett would like to receive affection and be intimate with her  but states that he does not care about those needs of hers  She feels like she carries a lot of responsibility in her family and it's her job to take care of everyone  She states that she does not put herself first  She often thinks about the future of her and her husbands health,finances and home  She describes her anxiety as that her face gets red and hot at night, her heart rate increases, her stomach gets upset when she thinks about her stressors  Julianne Bartlett reports keeping things bottled in,feelings of sadness, afraid of the future, tiredness, lack of motivation  She reports having no support from anyone      As per [facility]: "I keep things bottled in "      Strengths identified by patient: " living a long life, responsible, smart, likes a challenge "    Course of treatment includes:    group counseling, medication management, individual case management, allied therapy, psychoeducation and psychiatric evaluation     Treatment Progress: [facility] attended 10 PHP in which Андрей Westbrook challenged negative thoughts, engaging in healthy coping strategies and learned ways to manage personal stressors  [facility] was an active participant in program but needs to apply the concepts she learned  Андрей Westbrook was open to learning distress tolerance techniques, mindfulness techniques, meditation, coping strategies, and breathing exercises   Андрей Westbrook was able to identify personal issues and is working on problem solve options  Андрей Westbrook shared improvement through being motivated to come to program, less ruminating, and learned more skills   Андрей Pietro identified an increase in awareness of herself and what she needs  Denied SI, HI, and psychosis  Aftercare providers to receive summary  Aftercare recommendations include:   Medication Management:   Kip Buck [address]  [address]  [address] [facility] [address]  [facility], [address] [place] [address]  [phone]  Follow up: 6/18/21 at 11:00     Outpatient [address] [facility], [address] [facility]   Follow up: 6/23/21 at 3:30     Primary Care Physician:   Sugar Moreno, [address]  [address] [facility] [address] (Z) [phone] (x) [phone]     ICM/CTT:none     Keep Follow Up Appointments, Take Medication As Prescribed, Utilize WRAP, Consider Support Groups and/or Volunteer Opportunities     Discharge Medications include:  Current Outpatient Medications:     albuterol (2 5 mg/3 mL) 0 083 % nebulizer solution, Take 3 mL (2 5 mg total) by nebulization every 6 (six) hours as needed for wheezing, Disp: 75 mL, Rfl: 0    albuterol (PROAIR HFA) 90 mcg/act inhaler, Inhale 2 puffs every 6 (six) hours as needed , Disp: , Rfl:     ALPRAZolam (XANAX) 0 5 mg tablet, Take 1 tablet (0 5 mg total) by mouth 2 (two) times a day as needed for anxiety, Disp: 60 tablet, Rfl: 1    aspirin 81 MG tablet, Take 81 mg by mouth daily, Disp: , Rfl:     B-D UF III MINI PEN NEEDLES 31G X 5 MM MISC, , Disp: , Rfl:     BAQSIMI TWO PACK 3 MG/DOSE POWD, Use as directed, Disp: , Rfl: 0    betamethasone dipropionate 0 05 % lotion, , Disp: , Rfl:     cholecalciferol (VITAMIN D3) 1,000 units tablet, Take 2,000 Units by mouth daily, Disp: , Rfl:     cyclobenzaprine (FLEXERIL) 5 mg tablet, Take 1 tablet (5 mg total) by mouth daily at bedtime for 14 days, Disp: 14 tablet, Rfl: 0    diclofenac sodium (VOLTAREN) 1 %, Apply 2 g topically 4 (four) times a day (Patient not taking: Reported on 4/16/2021), Disp: 1 Tube, Rfl: 0    diltiazem (TIAZAC) 300 MG 24 hr capsule, Take 300 mg by mouth daily, Disp: , Rfl:     docusate sodium (COLACE) 100 mg capsule, Take 1 capsule (100 mg total) by mouth 2 (two) times a day, Disp: 20 capsule, Rfl: 0    enalapril (VASOTEC) 20 mg tablet, Take 20 mg by mouth daily, Disp: , Rfl:     escitalopram (LEXAPRO) 10 mg tablet, Take 5mg (1/2 tab) daily   If well tolerated after 1-2 weeks increase to 10mg , Disp: 30 tablet, Rfl: 0    insulin aspart (NovoLOG) 100 units/mL injection, Inject 12 Units under the skin 3 (three) times a day before meals (Patient taking differently: Inject under the skin 3 (three) times a day before meals ), Disp: , Rfl: 0    Insulin Disposable Pump (OmniPod Dash 5 Pack Pods) MISC, USE 1 POD EVERY 1 5 DAYS, Disp: , Rfl:     Insulin Disposable Pump (OMNIPOD DASH 5 PACK) MISC, CHANGE PODS EVERY 2 DAYS UTD, Disp: , Rfl: 3    isosorbide mononitrate (IMDUR) 60 mg 24 hr tablet, Take 60 mg by mouth daily at bedtime, Disp: , Rfl:     levothyroxine 50 mcg tablet, Take 50 mcg by mouth daily, Disp: , Rfl:     nitroglycerin (NITROLINGUAL) 0 4 mg/spray spray, USE ONE SPRAY Q 5 MINUTES AS NEEDED FOR CHEST PAIN   IF NO RELIEF, CALL 911 , Disp: , Rfl: 1    rosuvastatin (CRESTOR) 20 MG tablet, Take 20 mg by mouth every evening, Disp: , Rfl: 2    simethicone (MYLICON,GAS-X) 606 MG capsule, Take 1 capsule (180 mg total) by mouth every 12 (twelve) hours, Disp: 20 capsule, Rfl: 0

## 2021-06-15 ENCOUNTER — TELEPHONE (OUTPATIENT)
Dept: PSYCHIATRY | Facility: CLINIC | Age: 76
End: 2021-06-15

## 2021-06-15 ENCOUNTER — APPOINTMENT (OUTPATIENT)
Dept: PSYCHOLOGY | Facility: CLINIC | Age: 76
End: 2021-06-15
Payer: COMMERCIAL

## 2021-06-15 NOTE — TELEPHONE ENCOUNTER
Called and left message for Deepa Carroll to call our office to reschedule her apt with Dr Bo Hudson to 6/17

## 2021-06-16 ENCOUNTER — APPOINTMENT (OUTPATIENT)
Dept: PSYCHOLOGY | Facility: CLINIC | Age: 76
End: 2021-06-16
Payer: COMMERCIAL

## 2021-06-17 ENCOUNTER — OFFICE VISIT (OUTPATIENT)
Dept: PSYCHIATRY | Facility: CLINIC | Age: 76
End: 2021-06-17
Payer: COMMERCIAL

## 2021-06-17 ENCOUNTER — APPOINTMENT (OUTPATIENT)
Dept: PSYCHOLOGY | Facility: CLINIC | Age: 76
End: 2021-06-17
Payer: COMMERCIAL

## 2021-06-17 DIAGNOSIS — F41.1 GAD (GENERALIZED ANXIETY DISORDER): ICD-10-CM

## 2021-06-17 DIAGNOSIS — F33.2 MDD (MAJOR DEPRESSIVE DISORDER), RECURRENT SEVERE, WITHOUT PSYCHOSIS (HCC): Primary | ICD-10-CM

## 2021-06-17 DIAGNOSIS — M79.7 FIBROMYALGIA: Chronic | ICD-10-CM

## 2021-06-17 DIAGNOSIS — F41.0 PANIC ATTACKS: ICD-10-CM

## 2021-06-17 PROCEDURE — 90833 PSYTX W PT W E/M 30 MIN: CPT | Performed by: PSYCHIATRY & NEUROLOGY

## 2021-06-17 PROCEDURE — 99214 OFFICE O/P EST MOD 30 MIN: CPT | Performed by: PSYCHIATRY & NEUROLOGY
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
## 2021-06-17 NOTE — BH TREATMENT PLAN
Treatment Plan not completed within required time limits due to: Yajaira's presenting issues and complexity requiring additional clinical intervention  Discussed  developing a treatment plan at their  next scheduled appointment and patient is agreeable

## 2021-06-17 NOTE — PSYCH
This note was not shared with the patient due to this is a psychotherapy note       Hailey Common can be very indecisive, and self sabotaging inadvertently        5) Other: Support as needed   - ok relationship with , up and down   - mom's death in facility hard on her (6/2020)   - daughter struggling, has 3 kids total   - Children: 3

## 2021-06-18 ENCOUNTER — APPOINTMENT (OUTPATIENT)
Dept: PSYCHOLOGY | Facility: CLINIC | Age: 76
End: 2021-06-18
Payer: COMMERCIAL

## 2021-06-23 ENCOUNTER — SOCIAL WORK (OUTPATIENT)
Dept: BEHAVIORAL/MENTAL HEALTH CLINIC | Facility: CLINIC | Age: 76
End: 2021-06-23
Payer: COMMERCIAL

## 2021-06-23 DIAGNOSIS — F33.2 MDD (MAJOR DEPRESSIVE DISORDER), RECURRENT SEVERE, WITHOUT PSYCHOSIS (HCC): Primary | ICD-10-CM

## 2021-06-23 PROCEDURE — 90834 PSYTX W PT 45 MINUTES: CPT | Performed by: SOCIAL WORKER

## 2021-06-23 NOTE — PATIENT INSTRUCTIONS
Focused on progress made since last session via her own report but also as evidenced in her presentation  Reviewed coping strategies for depression and anxiety to continue to utilize when needed  Reviewed boundaries to maintain with daughter to lessen exposure to additional stress

## 2021-06-23 NOTE — PSYCH
Assessment/Plan: Manage depression and anxiety     There are no diagnoses linked to this encounter  Subjective: Ari Pimentel reports feeling less depressed and anxious than in previous sessions  She has been more active and engaged with her grandchildren and she cites this as being the primary reason  Has been more active away from home and this has been beneficial  Better able to manage/counterbalance stress at home  Tomorrow is the one year anniversary of her mother's death and she states she no longer feels or shoulders any guilt for her relationship with her mother  Patient ID: Harvey Parks is a 77 y o  female  Met with Ari Pimentel for 45 minutes from 3:30PM-4:15PM       Review of Systems   Psychiatric/Behavioral: Negative  Objective: Ari Pimentel denies any acute issues and presents accordingly  She is pleasant, verbal, cooperative and well oriented  Physical Exam  Psychiatric:         Attention and Perception: Attention and perception normal          Mood and Affect: Mood and affect normal          Speech: Speech normal          Behavior: Behavior normal  Behavior is cooperative  Thought Content:  Thought content normal          Cognition and Memory: Cognition and memory normal          Judgment: Judgment normal

## 2021-07-01 ENCOUNTER — TELEPHONE (OUTPATIENT)
Dept: OTHER | Facility: OTHER | Age: 76
End: 2021-07-01

## 2021-07-01 ENCOUNTER — HOSPITAL ENCOUNTER (EMERGENCY)
Facility: HOSPITAL | Age: 76
Discharge: HOME | End: 2021-07-01
Attending: EMERGENCY MEDICINE
Payer: COMMERCIAL

## 2021-07-01 ENCOUNTER — OFFICE VISIT (OUTPATIENT)
Dept: INTERNAL MEDICINE CLINIC | Facility: CLINIC | Age: 76
End: 2021-07-01
Payer: COMMERCIAL

## 2021-07-01 VITALS
HEIGHT: 61 IN | OXYGEN SATURATION: 98 % | HEART RATE: 76 BPM | BODY MASS INDEX: 37.38 KG/M2 | SYSTOLIC BLOOD PRESSURE: 154 MMHG | DIASTOLIC BLOOD PRESSURE: 80 MMHG | RESPIRATION RATE: 16 BRPM | WEIGHT: 198 LBS

## 2021-07-01 VITALS
BODY MASS INDEX: 37.41 KG/M2 | OXYGEN SATURATION: 99 % | HEART RATE: 82 BPM | SYSTOLIC BLOOD PRESSURE: 203 MMHG | TEMPERATURE: 97.9 F | RESPIRATION RATE: 18 BRPM | DIASTOLIC BLOOD PRESSURE: 88 MMHG | WEIGHT: 198 LBS

## 2021-07-01 DIAGNOSIS — B02.9 HERPES ZOSTER WITHOUT COMPLICATION: ICD-10-CM

## 2021-07-01 DIAGNOSIS — E66.01 CLASS 3 SEVERE OBESITY DUE TO EXCESS CALORIES WITHOUT SERIOUS COMORBIDITY IN ADULT, UNSPECIFIED BMI (HCC): ICD-10-CM

## 2021-07-01 DIAGNOSIS — E11.42 DIABETIC POLYNEUROPATHY ASSOCIATED WITH TYPE 2 DIABETES MELLITUS (HCC): ICD-10-CM

## 2021-07-01 DIAGNOSIS — M54.2 NECK PAIN: Primary | ICD-10-CM

## 2021-07-01 DIAGNOSIS — B02.22 TRIGEMINAL HERPES ZOSTER: Primary | ICD-10-CM

## 2021-07-01 DIAGNOSIS — Z23 ENCOUNTER FOR IMMUNIZATION: ICD-10-CM

## 2021-07-01 DIAGNOSIS — Z12.39 ENCOUNTER FOR SCREENING FOR MALIGNANT NEOPLASM OF BREAST, UNSPECIFIED SCREENING MODALITY: ICD-10-CM

## 2021-07-01 DIAGNOSIS — Z11.59 NEED FOR HEPATITIS C SCREENING TEST: ICD-10-CM

## 2021-07-01 PROCEDURE — 99284 EMERGENCY DEPT VISIT MOD MDM: CPT | Performed by: EMERGENCY MEDICINE

## 2021-07-01 PROCEDURE — 99283 EMERGENCY DEPT VISIT LOW MDM: CPT

## 2021-07-01 PROCEDURE — 1160F RVW MEDS BY RX/DR IN RCRD: CPT | Performed by: INTERNAL MEDICINE

## 2021-07-01 PROCEDURE — 99213 OFFICE O/P EST LOW 20 MIN: CPT | Performed by: INTERNAL MEDICINE

## 2021-07-01 PROCEDURE — 1036F TOBACCO NON-USER: CPT | Performed by: INTERNAL MEDICINE

## 2021-07-01 RX ORDER — VALACYCLOVIR HYDROCHLORIDE 1 G/1
1000 TABLET, FILM COATED ORAL 3 TIMES DAILY
Qty: 21 TABLET | Refills: 0 | Status: SHIPPED | OUTPATIENT
Start: 2021-07-01 | End: 2021-07-22 | Stop reason: SDUPTHER

## 2021-07-01 RX ORDER — GABAPENTIN 300 MG/1
300 CAPSULE ORAL 3 TIMES DAILY
Qty: 90 CAPSULE | Refills: 0 | OUTPATIENT
Start: 2021-07-01 | End: 2021-07-08

## 2021-07-01 RX ORDER — VALACYCLOVIR HYDROCHLORIDE 500 MG/1
1000 TABLET, FILM COATED ORAL ONCE
Status: COMPLETED | OUTPATIENT
Start: 2021-07-01 | End: 2021-07-01

## 2021-07-01 RX ORDER — NALOXONE HYDROCHLORIDE 4 MG/.1ML
SPRAY NASAL
Qty: 1 EACH | Refills: 1 | Status: SHIPPED | OUTPATIENT
Start: 2021-07-01

## 2021-07-01 RX ADMIN — VALACYCLOVIR HYDROCHLORIDE 1000 MG: 500 TABLET, FILM COATED ORAL at 09:56
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
## 2021-07-01 NOTE — ASSESSMENT & PLAN NOTE
Body mass index is 37 41 kg/m²       Recommend incorporating a more whole foods plant-predominant diet along with decreasing consumption of red meats and processed foods   Per AHA guidelines, recommend moderate-vigorous intensity exercise for 30 minutes a day for 5 days a week or a total of 150 min/week

## 2021-07-01 NOTE — ASSESSMENT & PLAN NOTE
· Patient had MADAN and is on Xanax 0 5 mg q AM     Plan:  · Continue Xanax 0 5 mg qAM  · Narcan 0 4mg/0 1 mL nasal spray

## 2021-07-01 NOTE — PATIENT INSTRUCTIONS
Take Tylenol every morning and at 4 pm  1, 000 mg   Please take the Valtrex 1 g twice a day instead of three times daily  Take Xanax as usual in the morning  Take Gabapentin 300 mg at noon and at bedtime  You can use narcan if you get too drowsy and have breathing problems     NO driving at all while on these medications  Please drink plenty of fluids while on the Valtrex

## 2021-07-02 ENCOUNTER — TELEPHONE (OUTPATIENT)
Dept: INTERNAL MEDICINE CLINIC | Facility: CLINIC | Age: 76
End: 2021-07-02

## 2021-07-02 DIAGNOSIS — B02.9 HERPES ZOSTER WITHOUT COMPLICATION: Primary | ICD-10-CM

## 2021-07-02 RX ORDER — GABAPENTIN 100 MG/1
100 CAPSULE ORAL 3 TIMES DAILY
Qty: 90 CAPSULE | Refills: 0 | Status: SHIPPED | OUTPATIENT
Start: 2021-07-02 | End: 2021-07-15

## 2021-07-02 NOTE — TELEPHONE ENCOUNTER
Done, let pt know    Description sent for 100 mg 3 times a day, and she could start it at just once a day and titrate up

## 2021-07-02 NOTE — TELEPHONE ENCOUNTER
Patient was prescribed gabapentin yesterday  Patient is uncomfortable taking such a high dose  Asking if she can be started at a lower dose          Please advise

## 2021-07-08 ENCOUNTER — HOSPITAL ENCOUNTER (EMERGENCY)
Facility: HOSPITAL | Age: 76
Discharge: HOME | End: 2021-07-08
Attending: EMERGENCY MEDICINE
Payer: COMMERCIAL

## 2021-07-08 VITALS
DIASTOLIC BLOOD PRESSURE: 57 MMHG | OXYGEN SATURATION: 100 % | RESPIRATION RATE: 18 BRPM | HEART RATE: 70 BPM | TEMPERATURE: 98.6 F | SYSTOLIC BLOOD PRESSURE: 165 MMHG

## 2021-07-08 DIAGNOSIS — B02.9 SHINGLES: Primary | ICD-10-CM

## 2021-07-08 PROCEDURE — 99283 EMERGENCY DEPT VISIT LOW MDM: CPT

## 2021-07-08 PROCEDURE — 99282 EMERGENCY DEPT VISIT SF MDM: CPT | Performed by: EMERGENCY MEDICINE

## 2021-07-08 RX ORDER — ACETAMINOPHEN AND CODEINE PHOSPHATE 300; 30 MG/1; MG/1
1-2 TABLET ORAL EVERY 6 HOURS PRN
Qty: 15 TABLET | Refills: 0 | Status: SHIPPED | OUTPATIENT
Start: 2021-07-08 | End: 2021-07-18

## 2021-07-08 NOTE — ED PROVIDER NOTES
mL) 0 083 % nebulizer solution  Self No No   Sig: Take 3 mL (2 5 mg total) by nebulization every 6 (six) hours as needed for wheezing   albuterol (PROAIR HFA) 90 mcg/act inhaler  Self Yes No   Sig: Inhale 2 puffs every 6 (six) hours as needed    aspirin 81 MG tablet  Self Yes No   Sig: Take 81 mg by mouth daily   betamethasone dipropionate 0 05 % lotion  Self Yes No   cholecalciferol (VITAMIN D3) 1,000 units tablet  Self Yes No   Sig: Take 2,000 Units by mouth daily   cyclobenzaprine (FLEXERIL) 5 mg tablet   No No   Sig: Take 1 tablet (5 mg total) by mouth daily at bedtime for 14 days   diclofenac sodium (VOLTAREN) 1 %  Self No No   Sig: Apply 2 g topically 4 (four) times a day   diltiazem (TIAZAC) 300 MG 24 hr capsule  Self Yes No   Sig: Take 300 mg by mouth daily   docusate sodium (COLACE) 100 mg capsule  Self No No   Sig: Take 1 capsule (100 mg total) by mouth 2 (two) times a day   enalapril (VASOTEC) 20 mg tablet  Self Yes No   Sig: Take 20 mg by mouth daily   gabapentin (NEURONTIN) 100 mg capsule   No No   Sig: Take 1 capsule (100 mg total) by mouth 3 (three) times a day   gabapentin (NEURONTIN) 300 mg capsule   No No   Sig: Take 1 capsule (300 mg total) by mouth 3 (three) times a day   insulin aspart (NovoLOG) 100 units/mL injection  Self No No   Sig: Inject 12 Units under the skin 3 (three) times a day before meals   Patient taking differently: Inject under the skin 3 (three) times a day before meals    isosorbide mononitrate (IMDUR) 60 mg 24 hr tablet  Self Yes No   Sig: Take 60 mg by mouth daily at bedtime   levothyroxine 50 mcg tablet  Self Yes No   Sig: Take 50 mcg by mouth daily   naloxone (NARCAN) 4 mg/0 1 mL nasal spray   No No   Sig: Administer 1 spray into a nostril  If no response after 2-3 minutes, give another dose in the other nostril using a new spray  nitroglycerin (NITROLINGUAL) 0 4 mg/spray spray  Self Yes No   Sig: USE ONE SPRAY Q 5 MINUTES AS NEEDED FOR CHEST PAIN  IF NO RELIEF, CALL 911  rosuvastatin (CRESTOR) 20 MG tablet  Self Yes No   Sig: Take 20 mg by mouth every evening   simethicone (MYLICON,GAS-X) 253 MG capsule  Self No No   Sig: Take 1 capsule (180 mg total) by mouth every 12 (twelve) hours   valACYclovir (VALTREX) 1,000 mg tablet   No No   Sig: Take 1 tablet (1,000 mg total) by mouth 3 (three) times a day for 7 days      Facility-Administered Medications: None       Past Medical History:   Diagnosis Date    Acute embolism and thrombosis of unspecified deep veins of unspecified lower extremity (HCC)     Last Assessed:  5/18/17    Anemia     Anosmia     Anxiety     Arthritis     Asthma     Back pain     Bilateral macular retinal edema     CAD (coronary artery disease)     Cataract     Cervical disc herniation     Cervical radiculopathy     Cervical spinal stenosis     Cervical spondylolysis     Chronic mastoiditis     Complex endometrial hyperplasia     Depression     Diabetes mellitus ([address] )     DVT (deep venous thrombosis) (HCC)     Fibromyalgia     Hyperlipidemia     Hypertension     Hypothyroid     Lumbar radiculopathy     Neck pain     Obese     Spinal stenosis     Stomach ulcer     Thyroid disease        Past Surgical History:   Procedure Laterality Date    BACK SURGERY      CARPAL TUNNEL RELEASE      CATARACT EXTRACTION      CHOLECYSTECTOMY      COLONOSCOPY      CORONARY ANGIOPLASTY      CORONARY ARTERY BYPASS GRAFT      CYSTOSCOPY N/A 6/20/2017    Procedure: Tamara Phillips;  Surgeon: Litzy Talbert MD;  [facility]: [facility];  Service: Gynecology Oncology    SC LAPAROSCOPY W TOT HYSTERECTUTERUS <=250 GRAM  W TUBE/OVARY N/A 6/20/2017    Procedure: ROBOTIC HYSTERECTOMY; BILATERAL SALPINO-OOPHERECTOMY; umbilical hernia repair ;  Surgeon: Litzy Talbert MD;  Location: [facility];  Service: Gynecology Oncology    TONSILECTOMY AND ADNOIDECTOMY      UMBILICAL HERNIA REPAIR         Family History   Problem Relation Age of Onset    Arthritis environmental allergies and immunocompromised state  Neurological: Negative for numbness and headaches  Hematological: Negative for adenopathy  Does not bruise/bleed easily  Psychiatric/Behavioral: Negative for agitation and behavioral problems  Physical Exam  Physical Exam  Vitals and nursing note reviewed  Constitutional:       General: She is not in acute distress  Appearance: She is well-developed  HENT:      Head: Normocephalic and atraumatic  Eyes:      Conjunctiva/sclera: Conjunctivae normal    Cardiovascular:      Rate and Rhythm: Normal rate and regular rhythm  Heart sounds: No murmur heard  Pulmonary:      Effort: Pulmonary effort is normal  No respiratory distress  Breath sounds: Normal breath sounds  Abdominal:      Palpations: Abdomen is soft  Tenderness: There is no abdominal tenderness  Musculoskeletal:      Cervical back: Neck supple  Skin:     General: Skin is warm and dry  Findings: Rash present  Rash is vesicular  Neurological:      Mental Status: She is alert           Vital Signs  ED Triage Vitals   Temperature Pulse Respirations Blood Pressure SpO2   07/08/21 1405 07/08/21 1404 07/08/21 1404 07/08/21 1404 07/08/21 1404   98 6 °F (37 °C) 70 18 165/57 100 %      Temp Source Heart Rate Source Patient Position - Orthostatic VS BP Location FiO2 (%)   07/08/21 1405 -- -- -- --   Oral          Pain Score       --                  Vitals:    07/08/21 1404   BP: 165/57   Pulse: 70         Visual Acuity      ED Medications  Medications - No data to display    Diagnostic Studies  Results Reviewed     None                 No orders to display              Procedures  Procedures         ED Course                                           MDM  Number of Diagnoses or Management Options  Shingles: established and improving  Risk of Complications, Morbidity, and/or Mortality  General comments: Discussed with patient and her  that rash is healing appropriately  I also discussed with her gabapentin in the side effects  I told her if it is not helping her pain and giving her side effects that she should not take it anymore  I then offered her some pain medication to take at home I wrote for a few Tylenol 3  Patient Progress  Patient progress: stable      Disposition  Final diagnoses:   Shingles     Time reflects when diagnosis was documented in both MDM as applicable and the Disposition within this note     Time User Action Codes Description Comment    7/8/2021  4:03 PM Dickson Grider Add [B02 9] Shingles       ED Disposition     ED Disposition Condition Date/Time Comment    Discharge Stable u Jul 8, 2021  4:03 PM Violet Garrett discharge to home/self care              Follow-up Information     Follow up With Specialties Details Why [address]  [facility], DO Internal Medicine Schedule an appointment as soon as possible for a visit   [address]  [phone]            Discharge Medication List as of 7/8/2021  4:06 PM      START taking these medications    Details   acetaminophen-codeine (TYLENOL #3) 300-30 mg per tablet Take 1-2 tablets by mouth every 6 (six) hours as needed for moderate pain for up to 10 days, Starting u 7/8/2021, Until Sun 7/18/2021 at 2359, Normal         CONTINUE these medications which have NOT CHANGED    Details   albuterol (2 5 mg/3 mL) 0 083 % nebulizer solution Take 3 mL (2 5 mg total) by nebulization every 6 (six) hours as needed for wheezing, Starting Thu 5/24/2018, Normal      albuterol (PROAIR HFA) 90 mcg/act inhaler Inhale 2 puffs every 6 (six) hours as needed , Starting Fri 5/23/2014, Historical Med      ALPRAZolam (XANAX) 0 5 mg tablet Take 1 tablet (0 5 mg total) by mouth 2 (two) times a day as needed for anxiety, Starting Wed 5/12/2021, Normal      aspirin 81 MG tablet Take 81 mg by mouth daily, Historical Med      B-D UF III MINI PEN NEEDLES 31G X 5 MM MISC Starting Fri 6/5/2020, Historical Med      BAQSIMI TWO PACK 3 MG/DOSE POWD Use as directed, Starting Tue 10/8/2019, Historical Med      betamethasone dipropionate 0 05 % lotion Starting Tue 4/27/2021, Historical Med      cholecalciferol (VITAMIN D3) 1,000 units tablet Take 2,000 Units by mouth daily, Historical Med      cyclobenzaprine (FLEXERIL) 5 mg tablet Take 1 tablet (5 mg total) by mouth daily at bedtime for 14 days, Starting Mon 6/7/2021, Until Thu 7/1/2021, Normal      diclofenac sodium (VOLTAREN) 1 % Apply 2 g topically 4 (four) times a day, Starting Tue 1/15/2019, Normal      diltiazem (TIAZAC) 300 MG 24 hr capsule Take 300 mg by mouth daily, Historical Med      docusate sodium (COLACE) 100 mg capsule Take 1 capsule (100 mg total) by mouth 2 (two) times a day, Starting Tue 4/20/2021, Normal      enalapril (VASOTEC) 20 mg tablet Take 20 mg by mouth daily, Historical Med      gabapentin (NEURONTIN) 100 mg capsule Take 1 capsule (100 mg total) by mouth 3 (three) times a day, Starting Fri 7/2/2021, Normal      insulin aspart (NovoLOG) 100 units/mL injection Inject 12 Units under the skin 3 (three) times a day before meals, Starting Thu 6/22/2017, No Print      !! Insulin Disposable Pump (OmniPod Dash 5 Pack Pods) MISC USE 1 POD EVERY 1 5 DAYS, Historical Med      !! Insulin Disposable Pump (OMNIPOD DASH 5 PACK) MISC CHANGE PODS EVERY 2 DAYS UTD, Historical Med      isosorbide mononitrate (IMDUR) 60 mg 24 hr tablet Take 60 mg by mouth daily at bedtime, Starting Fri 8/7/2020, Historical Med      Levomilnacipran HCl ER (Fetzima) 20 MG extended release capsule Take 1 capsule (20 mg total) by mouth daily, Starting Thu 6/17/2021, Normal      levothyroxine 50 mcg tablet Take 50 mcg by mouth daily, Historical Med      naloxone (NARCAN) 4 mg/0 1 mL nasal spray Administer 1 spray into a nostril   If no response after 2-3 minutes, give another dose in the other nostril using a new spray , Normal      nitroglycerin (NITROLINGUAL) 0 4 mg/spray spray USE ONE SPRAY Q 5 MINUTES AS NEEDED FOR CHEST PAIN  IF NO RELIEF, CALL 911 , Historical Med      rosuvastatin (CRESTOR) 20 MG tablet Take 20 mg by mouth every evening, Starting Tue 12/4/2018, Historical Med      simethicone (MYLICON,GAS-X) 121 MG capsule Take 1 capsule (180 mg total) by mouth every 12 (twelve) hours, Starting Tue 4/20/2021, Normal      valACYclovir (VALTREX) 1,000 mg tablet Take 1 tablet (1,000 mg total) by mouth 3 (three) times a day for 7 days, Starting Thu 7/1/2021, Until Thu 7/8/2021, Normal       !! - Potential duplicate medications found  Please discuss with provider  No discharge procedures on file      PDMP Review     None          ED Provider  Electronically Signed by           Isabel Ding DO  07/08/21 2943

## 2021-07-08 NOTE — ED NOTES
Pt seen, assessed, and discharged by provider  See provider notes for further information        Jim Gibbs RN  07/08/21 1333

## 2021-07-14 ENCOUNTER — TELEPHONE (OUTPATIENT)
Dept: INTERNAL MEDICINE CLINIC | Facility: CLINIC | Age: 76
End: 2021-07-14

## 2021-07-14 NOTE — TELEPHONE ENCOUNTER
Patient is still not having any relief since having the shingles  She is in a lot of pain and nothing is helping  Patient having a hard time sleeping at night  Patient is asking if there is something else that can be recommended to give her relief          Please advise

## 2021-07-15 ENCOUNTER — OFFICE VISIT (OUTPATIENT)
Dept: INTERNAL MEDICINE CLINIC | Facility: CLINIC | Age: 76
End: 2021-07-15
Payer: COMMERCIAL

## 2021-07-15 VITALS
HEART RATE: 72 BPM | HEIGHT: 61 IN | BODY MASS INDEX: 37.38 KG/M2 | TEMPERATURE: 97.5 F | OXYGEN SATURATION: 97 % | DIASTOLIC BLOOD PRESSURE: 66 MMHG | WEIGHT: 198 LBS | SYSTOLIC BLOOD PRESSURE: 152 MMHG

## 2021-07-15 DIAGNOSIS — B02.29 POST HERPETIC NEURALGIA: Primary | ICD-10-CM

## 2021-07-15 DIAGNOSIS — N18.32 STAGE 3B CHRONIC KIDNEY DISEASE (HCC): ICD-10-CM

## 2021-07-15 PROCEDURE — 3008F BODY MASS INDEX DOCD: CPT | Performed by: INTERNAL MEDICINE

## 2021-07-15 PROCEDURE — 99213 OFFICE O/P EST LOW 20 MIN: CPT | Performed by: NURSE PRACTITIONER

## 2021-07-15 RX ORDER — PREGABALIN 50 MG/1
50 CAPSULE ORAL 2 TIMES DAILY
Qty: 30 CAPSULE | Refills: 0 | Status: SHIPPED | OUTPATIENT
Start: 2021-07-15 | End: 2021-08-03 | Stop reason: SDUPTHER

## 2021-07-15 RX ORDER — PREGABALIN 50 MG/1
50 CAPSULE ORAL 2 TIMES DAILY
Qty: 30 CAPSULE | Refills: 0
Start: 2021-07-15 | End: 2021-07-15 | Stop reason: SDUPTHER

## 2021-07-15 NOTE — ASSESSMENT & PLAN NOTE
Lab Results   Component Value Date    EGFR 31 05/17/2021    EGFR 31 11/11/2020    EGFR 37 04/24/2018    CREATININE 1 64 (H) 05/17/2021    CREATININE 1 64 (H) 11/11/2020    CREATININE 1 60 (H) 05/27/2020       Repeat bmp next week

## 2021-07-15 NOTE — PROGRESS NOTES
Assessment/Plan:    Post herpetic neuralgia  Try lyrica for pain    CKD (chronic kidney disease)  Lab Results   Component Value Date    EGFR 31 05/17/2021    EGFR 31 11/11/2020    EGFR 37 04/24/2018    CREATININE 1 64 (H) 05/17/2021    CREATININE 1 64 (H) 11/11/2020    CREATININE 1 60 (H) 05/27/2020       Repeat bmp next week       Diagnoses and all orders for this visit:    Post herpetic neuralgia  -     Discontinue: pregabalin (LYRICA) 50 mg capsule; Take 1 capsule (50 mg total) by mouth 2 (two) times a day  -     Basic metabolic panel; Future  -     pregabalin (LYRICA) 50 mg capsule; Take 1 capsule (50 mg total) by mouth 2 (two) times a day    Stage 3b chronic kidney disease (HCC)          Subjective:      Patient ID: Ari Age is a 77 y o  female  Patient is here for shingles pain  Has a rash on the back of her neck  Went to ER prescribed tylenol with codeine which makes her sleepy and helps for an hour  Gabapentin made her drowsy  Still in a lot of pain      The following portions of the patient's history were reviewed and updated as appropriate: allergies, current medications, past family history, past medical history, past social history, past surgical history and problem list     Review of Systems   Constitutional: Negative  HENT: Negative  Eyes: Negative  Respiratory: Negative  Cardiovascular: Negative  Gastrointestinal: Negative  Musculoskeletal: Negative  Skin: Positive for rash  Neurological: Negative  Objective:      /66   Pulse 72   Temp 97 5 °F (36 4 °C) (Temporal)   Ht 5' 1" (1 549 m)   Wt 89 8 kg (198 lb)   SpO2 97%   BMI 37 41 kg/m²          Physical Exam  Vitals and nursing note reviewed  Constitutional:       Appearance: She is well-developed  HENT:      Head: Normocephalic and atraumatic        Right Ear: External ear normal       Left Ear: External ear normal       Nose: Nose normal    Eyes:      Conjunctiva/sclera: Conjunctivae normal  Pupils: Pupils are equal, round, and reactive to light  Musculoskeletal:         General: Normal range of motion  Cervical back: Normal range of motion and neck supple  Skin:     General: Skin is warm and dry  Neurological:      Mental Status: She is alert and oriented to person, place, and time

## 2021-07-20 ENCOUNTER — OFFICE VISIT (OUTPATIENT)
Dept: INTERNAL MEDICINE CLINIC | Facility: CLINIC | Age: 76
End: 2021-07-20
Payer: COMMERCIAL

## 2021-07-20 VITALS
HEART RATE: 76 BPM | BODY MASS INDEX: 37 KG/M2 | DIASTOLIC BLOOD PRESSURE: 60 MMHG | TEMPERATURE: 97.6 F | SYSTOLIC BLOOD PRESSURE: 150 MMHG | OXYGEN SATURATION: 97 % | HEIGHT: 61 IN | WEIGHT: 196 LBS

## 2021-07-20 DIAGNOSIS — B02.29 POST HERPETIC NEURALGIA: ICD-10-CM

## 2021-07-20 PROCEDURE — 99213 OFFICE O/P EST LOW 20 MIN: CPT | Performed by: GENERAL ACUTE CARE HOSPITAL

## 2021-07-20 RX ORDER — PREGABALIN 50 MG/1
75 CAPSULE ORAL 2 TIMES DAILY
Qty: 30 CAPSULE | Refills: 0 | Status: CANCELLED | OUTPATIENT
Start: 2021-07-20

## 2021-07-20 RX ORDER — PREGABALIN 25 MG/1
25 CAPSULE ORAL
Qty: 30 CAPSULE | Refills: 0 | Status: SHIPPED | OUTPATIENT
Start: 2021-07-20 | End: 2021-08-04

## 2021-07-20 NOTE — ASSESSMENT & PLAN NOTE
Still has itching/pain at the back of her neck/head  Requests topical medication to alleviate it  After review of recommendations for neuralgia, will increase her 1st line treatment Lyrica- 50 mg q a m , 75 mg q h s     At this time capsaicin not an ideal topical agent as patient still has some lesions actively  Will consider this after lesions have improved  It seems Topical lidocaine has not shown to provide much benefit however if patient calls back or continues to have significant itching may introduce this as well  If patient has worsening vision, with worsening eye exam would refer to ophthalmology

## 2021-07-20 NOTE — PROGRESS NOTES
Assessment/Plan:     1  Post herpetic neuralgia  Assessment & Plan:  Still has itching/pain at the back of her neck/head  Requests topical medication to alleviate it  After review of recommendations for neuralgia, will increase her 1st line treatment Lyrica- 50 mg q a m , 75 mg q h s     At this time capsaicin not an ideal topical agent as patient still has some lesions actively  Will consider this after lesions have improved  It seems Topical lidocaine has not shown to provide much benefit however if patient calls back or continues to have significant itching may introduce this as well  If patient has worsening vision, with worsening eye exam would refer to ophthalmology  Orders:  -     pregabalin (LYRICA) 25 mg capsule; Take 1 capsule (25 mg total) by mouth daily at bedtime Take with 50mg at night to make it 75mg qhs, morning dose remains to be 50mg        Subjective:      Patient ID: María Frias is a 77 y o  female  Is here for follow up of her shingles on her neck as the itching/pain from shingles  30-40% of relief from  Improvement of w/ lyrica  Improved sleep, but still has sleep disturbance from itching  Numbness of right facial still present  States that the  has been checking the back of her neck and occipital area for resolution and has noted improvement  Does not report rashes/vesicles anywhere else in body  Denies temperature, hearing loss, new vision loss/disturbance, new respiratory symptoms  Patient has only tried medications approved by provider such as Lyrica/gabapentin however has not tried anything on her own  She requests something to help her with the pain/itching and she is unable to sleep well and continues to bother her  She has asked specifically about topical ointments in addition to the orals she is already receiving    She is unable to decipher if the Lyrica has helped her or if the improvement of the rash of the rash has caused the resolution of symptoms  The following portions of the patient's history were reviewed and updated as appropriate: allergies, current medications, past family history, past medical history, past social history, past surgical history, and problem list     Review of Systems   Constitutional: Negative for chills and fever  HENT: Negative for ear pain and sore throat  Eyes: Positive for visual disturbance (Baseline right-sided,)  Negative for pain  Respiratory: Negative for cough and shortness of breath  Cardiovascular: Negative for chest pain and palpitations  Gastrointestinal: Negative for abdominal pain and vomiting  Genitourinary: Negative for dysuria and frequency  Musculoskeletal: Positive for neck pain  Negative for arthralgias  Skin: Positive for color change and rash  Neurological: Negative for syncope and facial asymmetry  Psychiatric/Behavioral: Negative for confusion  All other systems reviewed and are negative  Objective:      /60   Pulse 76   Temp 97 6 °F (36 4 °C) (Temporal)   Ht 5' 1" (1 549 m)   Wt 88 9 kg (196 lb)   SpO2 97%   BMI 37 03 kg/m²          Physical Exam  Vitals and nursing note reviewed  Constitutional:       Appearance: Normal appearance  HENT:      Head: Normocephalic and atraumatic  Eyes:      Extraocular Movements: Extraocular movements intact  Right eye: Normal extraocular motion  Left eye: Normal extraocular motion  Comments: On confrontation tests, she has limited right peripheral vision loss, however she states that she has had her baseline vision  Cardiovascular:      Rate and Rhythm: Normal rate and regular rhythm  Pulses: Normal pulses  Heart sounds: Normal heart sounds  Pulmonary:      Effort: Pulmonary effort is normal       Breath sounds: Normal breath sounds  Abdominal:      General: Abdomen is flat  Skin:     General: Skin is warm            Neurological:      Mental Status: She is alert and oriented to person, place, and time     Psychiatric:         Mood and Affect: Mood normal          Behavior: Behavior normal

## 2021-07-21 ENCOUNTER — TELEPHONE (OUTPATIENT)
Dept: PSYCHIATRY | Facility: CLINIC | Age: 76
End: 2021-07-21

## 2021-07-21 NOTE — TELEPHONE ENCOUNTER
Follow up call made to Hossein Pitts  She reports never starting Fetzima - this was the new medication she referred to in this message  She feels she needs more information, but didn't say specifically what her concerns were  She has been taking alprazolam 2 times a day  Hossein Pitts developed shingles on 7/1 and has been trying to get the pain under control  Her primary doctor was away on vacation, so she saw other providers (twice to doctors office and once to the ED) to try to manage the pain  She was given Tylenol with codeine, gabapentin and Lyrica, but none has helped  She will be seeing her primary doctor tomorrow and hopes to get relief

## 2021-07-21 NOTE — TELEPHONE ENCOUNTER
Patient called to cancel her appointment for tomorrow with Natali Patel, she has Shingles  She does not want to wait until your next available appointment as she is not taking any medication right now  Patient was put on new medication and wanted to discuss it with you before starting it as she is a little concerned about it

## 2021-07-22 ENCOUNTER — OFFICE VISIT (OUTPATIENT)
Dept: INTERNAL MEDICINE CLINIC | Facility: CLINIC | Age: 76
End: 2021-07-22
Payer: COMMERCIAL

## 2021-07-22 VITALS
SYSTOLIC BLOOD PRESSURE: 138 MMHG | BODY MASS INDEX: 37.42 KG/M2 | HEIGHT: 61 IN | WEIGHT: 198.2 LBS | HEART RATE: 60 BPM | DIASTOLIC BLOOD PRESSURE: 70 MMHG | RESPIRATION RATE: 16 BRPM | OXYGEN SATURATION: 97 %

## 2021-07-22 DIAGNOSIS — B02.9 HERPES ZOSTER WITHOUT COMPLICATION: ICD-10-CM

## 2021-07-22 PROCEDURE — 99213 OFFICE O/P EST LOW 20 MIN: CPT | Performed by: INTERNAL MEDICINE

## 2021-07-22 RX ORDER — VALACYCLOVIR HYDROCHLORIDE 1 G/1
1000 TABLET, FILM COATED ORAL 2 TIMES DAILY
Qty: 14 TABLET | Refills: 0 | Status: SHIPPED | OUTPATIENT
Start: 2021-07-22 | End: 2022-05-06

## 2021-07-22 NOTE — PROGRESS NOTES
Assessment/Plan:    Herpes zoster without complication   Patient does have several new active vesicles on the posterior right neck region also having symptoms of post herpetic neuralgia because of the active vesicles will retreat patient with Valtrex 1 g b i d  x7 days also patient will start Lyrica 75 mg b i d  she has a follow-up in 4 days call if any change, worse         Problem List Items Addressed This Visit        Other    Herpes zoster without complication      Patient does have several new active vesicles on the posterior right neck region also having symptoms of post herpetic neuralgia because of the active vesicles will retreat patient with Valtrex 1 g b i d  x7 days also patient will start Lyrica 75 mg b i d  she has a follow-up in 4 days call if any change, worse         Relevant Medications    valACYclovir (VALTREX) 1,000 mg tablet           patient's dose of Valtrex was dose adjusted based on GFR  Subjective:      Patient ID: Pia Diaz is a 77 y o  female  HPI    78-year-old female chief complaint worsening shingles and post herpetic neuralgia; over last several days the patient has noticed new vesicles on the posterior right neck, scalp and shoulder region that are worsening she has noticed new vesicles with erythematous base symptoms had originally improved with Valtrex but not completely but in the last several days she has noticed these new vesicles along with pain  pt reports still sees a rash ,  Had prickley pain, numbness in hands and right face,  Still with some rash and spreading further , also right posterior scalp; didn't increase the lyrica to 75 yet    The following portions of the patient's history were reviewed and updated as appropriate: allergies, current medications, past family history, past medical history, past social history, past surgical history and problem list     Review of Systems   Constitutional: Positive for fever (  Low-grade 99)  Negative for chills  Respiratory: Negative for shortness of breath  Cardiovascular: Positive for palpitations ( patient does report me palpitations related to low sugar she will be discussing this further with cardiology and endocrinology)  Negative for chest pain  Skin: Positive for rash  Post herpetic neuralgia /skin pain         Objective:    No follow-ups on file  No results found        Allergies   Allergen Reactions    Molds & Smuts Allergic Rhinitis    Other Allergic Rhinitis     RAGWEED, CAT DANDER, DOG DANDER     Pollen Extract Other (See Comments)     Cold symptoms         Past Medical History:   Diagnosis Date    Acute embolism and thrombosis of unspecified deep veins of unspecified lower extremity (HCC)     Last Assessed:  5/18/17    Anemia     Anosmia     Anxiety     Arthritis     Asthma     Back pain     Bilateral macular retinal edema     CAD (coronary artery disease)     Cataract     Cervical disc herniation     Cervical radiculopathy     Cervical spinal stenosis     Cervical spondylolysis     Chronic mastoiditis     Complex endometrial hyperplasia     Depression     Diabetes mellitus ([address] )     DVT (deep venous thrombosis) (HCC)     Fibromyalgia     Hyperlipidemia     Hypertension     Hypothyroid     Lumbar radiculopathy     Neck pain     Obese     Spinal stenosis     Stomach ulcer     Thyroid disease      Past Surgical History:   Procedure Laterality Date    BACK SURGERY      CARPAL TUNNEL RELEASE      CATARACT EXTRACTION      CHOLECYSTECTOMY      COLONOSCOPY      CORONARY ANGIOPLASTY      CORONARY ARTERY BYPASS GRAFT      CYSTOSCOPY N/A 6/20/2017    Procedure: CYSTOSCOPY;  Surgeon: Felipe Callahan MD;  [facility];  Service: Gynecology Oncology    GA LAPAROSCOPY W TOT HYSTERECTUTERUS <=250 GRAM  W TUBE/OVARY N/A 6/20/2017    Procedure: ROBOTIC HYSTERECTOMY; BILATERAL SALPINO-OOPHERECTOMY; umbilical hernia repair ;  Surgeon: Felipe Callahan MD; [facility];  Service: Gynecology Oncology    TONSILECTOMY AND ADNOIDECTOMY      UMBILICAL HERNIA REPAIR       Current Outpatient Medications on File Prior to Visit   Medication Sig Dispense Refill    aspirin 81 MG tablet Take 81 mg by mouth daily      B-D UF III MINI PEN NEEDLES 31G X 5 MM MISC       BAQSIMI TWO PACK 3 MG/DOSE POWD Use as directed  0    cholecalciferol (VITAMIN D3) 1,000 units tablet Take 2,000 Units by mouth daily      diclofenac sodium (VOLTAREN) 1 % Apply 2 g topically 4 (four) times a day 1 Tube 0    diltiazem (TIAZAC) 300 MG 24 hr capsule Take 300 mg by mouth daily      enalapril (VASOTEC) 20 mg tablet Take 20 mg by mouth daily      insulin aspart (NovoLOG) 100 units/mL injection Inject 12 Units under the skin 3 (three) times a day before meals (Patient taking differently: Inject under the skin 3 (three) times a day before meals )  0    Insulin Disposable Pump (OmniPod Dash 5 Pack Pods) MISC USE 1 POD EVERY 1 5 DAYS      Insulin Disposable Pump (OMNIPOD DASH 5 PACK) MISC CHANGE PODS EVERY 2 DAYS UTD  3    isosorbide mononitrate (IMDUR) 60 mg 24 hr tablet Take 60 mg by mouth daily at bedtime      levothyroxine 50 mcg tablet Take 50 mcg by mouth daily      naloxone (NARCAN) 4 mg/0 1 mL nasal spray Administer 1 spray into a nostril  If no response after 2-3 minutes, give another dose in the other nostril using a new spray   1 each 1    pregabalin (LYRICA) 25 mg capsule Take 1 capsule (25 mg total) by mouth daily at bedtime Take with 50mg at night to make it 75mg qhs, morning dose remains to be 50mg 30 capsule 0    pregabalin (LYRICA) 50 mg capsule Take 1 capsule (50 mg total) by mouth 2 (two) times a day 30 capsule 0    rosuvastatin (CRESTOR) 20 MG tablet Take 20 mg by mouth every evening  2    albuterol (2 5 mg/3 mL) 0 083 % nebulizer solution Take 3 mL (2 5 mg total) by nebulization every 6 (six) hours as needed for wheezing (Patient not taking: Reported on 7/15/2021) 75 mL 0    albuterol (PROAIR HFA) 90 mcg/act inhaler Inhale 2 puffs every 6 (six) hours as needed  (Patient not taking: Reported on 7/15/2021)      ALPRAZolam (XANAX) 0 5 mg tablet Take 1 tablet (0 5 mg total) by mouth 2 (two) times a day as needed for anxiety (Patient not taking: Reported on 7/20/2021) 60 tablet 1    betamethasone dipropionate 0 05 % lotion  (Patient not taking: Reported on 7/15/2021)      cyclobenzaprine (FLEXERIL) 5 mg tablet Take 1 tablet (5 mg total) by mouth daily at bedtime for 14 days 14 tablet 0    docusate sodium (COLACE) 100 mg capsule Take 1 capsule (100 mg total) by mouth 2 (two) times a day (Patient not taking: Reported on 7/15/2021) 20 capsule 0    nitroglycerin (NITROLINGUAL) 0 4 mg/spray spray USE ONE SPRAY Q 5 MINUTES AS NEEDED FOR CHEST PAIN  IF NO RELIEF, CALL 911  (Patient not taking: Reported on 7/15/2021)  1    simethicone (MYLICON,GAS-X) 880 MG capsule Take 1 capsule (180 mg total) by mouth every 12 (twelve) hours (Patient not taking: Reported on 7/15/2021) 20 capsule 0    [DISCONTINUED] valACYclovir (VALTREX) 1,000 mg tablet Take 1 tablet (1,000 mg total) by mouth 3 (three) times a day for 7 days 21 tablet 0     No current facility-administered medications on file prior to visit       Family History   Problem Relation Age of Onset    Arthritis Mother     Leukemia Mother     Other Mother         Anxiety, major depressive disorder, recurrent episode with atypical features    Coronary artery disease Father         Heart problem    Diabetes Father     Other Father         Infectious disease    Alzheimer's disease Maternal Grandmother     Other Maternal Grandfather         Heart problem    Other Daughter         Anxiety, major depressive disorder, recurrent episode with atypical features    Alcohol abuse Other         Grandparent    Cancer Family     Diabetes Family     Hypertension Family     Other Family         Reported prior back trouble, thyroid disorder Social History     Socioeconomic History    Marital status: /Civil Union     Spouse name: Not on file    Number of children: 2    Years of education: High school or GED    Highest education level: Not on file   Occupational History    Occupation: Retired   Tobacco Use    Smoking status: Former Smoker     Packs/day: 1 00     Years: 6 00     Pack years: 6 00     Types: Cigarettes     Quit date:      Years since quittin 5    Smokeless tobacco: Never Used    Tobacco comment: Smoked about a half a pack for about 4 yrs  Quite about 50 yrs ago  Vaping Use    Vaping Use: Never used   Substance and Sexual Activity    Alcohol use: No    Drug use: No    Sexual activity: Never     Comment: No known STD risk factors   Other Topics Concern    Not on file   Social History Narrative    Lives independently with spouse    No known risk factors    Denied:  Exercising regularly     Social Determinants of Health     Financial Resource Strain:     Difficulty of Paying Living Expenses:    Food Insecurity:     Worried About Running Out of Food in the Last Year:     [address] in the Last Year:    Transportation Needs:     Lack of Transportation (Medical):      Lack of Transportation (Non-Medical):    Physical Activity:     Days of Exercise per Week:     Minutes of Exercise per Session:    Stress:     Feeling of Stress :    Social Connections:     Frequency of Communication with Friends and Family:     Frequency of Social Gatherings with Friends and Family:     Attends Holiness Services:     Active Member of Clubs or Organizations:     Attends Club or Organization Meetings:     Marital Status:    Intimate Partner Violence:     Fear of Current or Ex-Partner:     Emotionally Abused:     Physically Abused:     Sexually Abused:      Vitals:    21 1428   BP: 138/70   Pulse: 60   Resp: 16   SpO2: 97%   Weight: 89 9 kg (198 lb 3 2 oz)   Height: 5' 1" (1 549 m)     Results for orders placed or performed in visit on 12/53/96   Basic metabolic panel   Result Value Ref Range    Sodium 137 136 - 145 mmol/L    Potassium 4 7 3 5 - 5 3 mmol/L    Chloride 102 100 - 108 mmol/L    CO2 24 21 - 32 mmol/L    ANION GAP 11 4 - 13 mmol/L    BUN 21 5 - 25 mg/dL    Creatinine 1 64 (H) 0 60 - 1 30 mg/dL    Glucose 188 (H) 65 - 140 mg/dL    Calcium 9 5 8 3 - 10 1 mg/dL    eGFR 31 ml/min/1 73sq m     Weight (last 2 days)     Date/Time   Weight    07/22/21 1428   89 9 (198 2)            Body mass index is 37 45 kg/m²  BP      Temp      Pulse     Resp      SpO2        Vitals:    07/22/21 1428   Weight: 89 9 kg (198 lb 3 2 oz)     Vitals:    07/22/21 1428   Weight: 89 9 kg (198 lb 3 2 oz)       /70   Pulse 60   Resp 16   Ht 5' 1" (1 549 m)   Wt 89 9 kg (198 lb 3 2 oz)   SpO2 97%   BMI 37 45 kg/m²          Physical Exam  Constitutional:       Appearance: She is well-developed  HENT:      Head: Normocephalic  Eyes:      General: No scleral icterus  Right eye: No discharge  Left eye: No discharge  Conjunctiva/sclera: Conjunctivae normal       Pupils: Pupils are equal, round, and reactive to light  Cardiovascular:      Rate and Rhythm: Normal rate and regular rhythm  Heart sounds: Normal heart sounds  No murmur heard  No friction rub  No gallop  Pulmonary:      Effort: No respiratory distress  Breath sounds: Normal breath sounds  No wheezing or rales  Musculoskeletal:         General: No deformity  Cervical back: Neck supple  Neurological:      Mental Status: She is alert          Examination of the posterior neck and also right posterior scalp does show several vesicles on erythematous base consistent with shingles

## 2021-07-22 NOTE — ASSESSMENT & PLAN NOTE
Patient does have several new active vesicles on the posterior right neck region also having symptoms of post herpetic neuralgia because of the active vesicles will retreat patient with Valtrex 1 g b i d  x7 days also patient will start Lyrica 75 mg b i d  she has a follow-up in 4 days call if any change, worse

## 2021-07-27 ENCOUNTER — TELEMEDICINE (OUTPATIENT)
Dept: INTERNAL MEDICINE CLINIC | Facility: CLINIC | Age: 76
End: 2021-07-27
Payer: COMMERCIAL

## 2021-07-27 DIAGNOSIS — B02.9 HERPES ZOSTER WITHOUT COMPLICATION: Primary | ICD-10-CM

## 2021-07-27 PROBLEM — B02.8 HERPES ZOSTER WITH COMPLICATION: Status: ACTIVE | Noted: 2021-07-01

## 2021-07-27 PROCEDURE — 1160F RVW MEDS BY RX/DR IN RCRD: CPT | Performed by: GENERAL ACUTE CARE HOSPITAL

## 2021-07-27 PROCEDURE — 1036F TOBACCO NON-USER: CPT | Performed by: GENERAL ACUTE CARE HOSPITAL

## 2021-07-27 PROCEDURE — 99213 OFFICE O/P EST LOW 20 MIN: CPT | Performed by: GENERAL ACUTE CARE HOSPITAL

## 2021-07-27 NOTE — PROGRESS NOTES
Virtual Brief Visit    Verification of patient location:    Patient is located in the following state in which I hold an active license [place]      Assessment/Plan:    Problem List Items Addressed This Visit        Other    Herpes zoster without complication - Primary       Patient did not take of Valtrex yesterday  However she has been asked to complete the 7 day course of Valtrex as per recommendations  As the rash is improving will not get any further PCR testing at site, however will monitor for further workup  Have informed the patient to let us know if she has any new eye, ear issues which were not present  She also states that she has no feeling  In her legs when she lies down however it is present when she stands or sits  Due to sensation being present it is unlikely that her CNS is affected such as myelitis, however as this is abnormal  And could be due to medication side effect, will follow-up with her on Friday after completion of her Valtrex  She states for pain medication she does not wish to increase her Lyrica as she already does not feel like herself  She has been advised that she can cut down on Lyrica from 75 b i d  to 50 b i d  should she require less pain relief  However at this time she has decided to maintain 75 b i d  as she does have significant pain  She does not wish to pursue other pain medications at this time as they can further worsen her sense of self,  She states  1  Herpes zoster without complication  Assessment & Plan:    Patient did not take of Valtrex yesterday  However she has been asked to complete the 7 day course of Valtrex as per recommendations  As the rash is improving will not get any further PCR testing at site, however will monitor for further workup  Have informed the patient to let us know if she has any new eye, ear issues which were not present    She also states that she has no feeling  In her legs when she lies down however it is present when she stands or sits  Due to sensation being present it is unlikely that her CNS is affected such as myelitis, however as this is abnormal  And could be due to medication side effect, will follow-up with her on Friday after completion of her Valtrex  She states for pain medication she does not wish to increase her Lyrica as she already does not feel like herself  She has been advised that she can cut down on Lyrica from 75 b i d  to 50 b i d  should she require less pain relief  However at this time she has decided to maintain 75 b i d  as she does have significant pain  She does not wish to pursue other pain medications at this time as they can further worsen her sense of self,  She states  Reason for visit is   Chief Complaint   Patient presents with    Virtual Brief Visit        Encounter provider Abbey Moran MD    Provider located at [address] [place], [place] [id number]    Recent Visits  Date Type Provider Dept   07/22/21 Office Visit Nicho Price, [facility]   07/20/21 Office Visit Abbey Moran MD [address] recent visits within past 7 days and meeting all other requirements  Today's Visits  Date Type Provider Dept   07/27/21 Herlinda Acevedo MD [facility] today's visits and meeting all other requirements  Future Appointments  No visits were found meeting these conditions  Showing future appointments within next 150 days and meeting all other requirements       After connecting through [employer] and patient was informed that this is a secure, HIPAA-compliant platform  She agrees to proceed  , the patient was identified by name and date of birth   Mónica Winn was informed that this is a telemedicine visit and that the visit is being conducted through [facility] and patient was informed that this is a secure, HIPAA-compliant platform  She agrees to proceed  My office door was closed  No one else was in the room  She acknowledged consent and understanding of privacy and security of the platform  The patient has agreed to participate and understands she can discontinue the visit at any time  Patient is aware this is a billable service  Subjective    Cherry Saunders is a 77 y o  female   So 61-year-old female states that she has improving rash at her back of neck and up and occipital region  However she continues to have numbness in her face  On the right side of her face  Which is slightly improved since her last visit with Dr Sebastian March  When she lays down she feels like her legs  "Are not there"  This has occurred over the last 3 days  Has not noticed any rashes   In these areas  No urinary or bowel incontinence  She has a pricking sensation in her hands/  And had some hands so is unable to write  has increased Lyrica to 75 b i d  not wish to take any more than the 75  She started taking this increased dose on 07/22  Patient did not complete her 7 day course, she missed yesterday  Or today  Continues to have sleep disturbance due to lesions  Did not like lidocaine topically as it was burning  Denies systemic illness  Does not have any particular eye, ear abnormalities such as  New vision loss,  Hearing loss,  Tinnitus  No new rashes  No back pain  No trauma         Past Medical History:   Diagnosis Date    Acute embolism and thrombosis of unspecified deep veins of unspecified lower extremity (HCC)     Last Assessed:  5/18/17    Anemia     Anosmia     Anxiety     Arthritis     Asthma     Back pain     Bilateral macular retinal edema     CAD (coronary artery disease)     Cataract     Cervical disc herniation     Cervical radiculopathy     Cervical spinal stenosis     Cervical spondylolysis     Chronic mastoiditis     Complex endometrial hyperplasia     Depression     Diabetes mellitus ([address] )     DVT (deep venous thrombosis) (HCC)     Fibromyalgia     Hyperlipidemia     Hypertension     Hypothyroid     Lumbar radiculopathy     Neck pain     Obese     Spinal stenosis     Stomach ulcer     Thyroid disease        Past Surgical History:   Procedure Laterality Date    BACK SURGERY      CARPAL TUNNEL RELEASE      CATARACT EXTRACTION      CHOLECYSTECTOMY      COLONOSCOPY      CORONARY ANGIOPLASTY      CORONARY ARTERY BYPASS GRAFT      CYSTOSCOPY N/A 6/20/2017    Procedure: Segundo Khan;  Surgeon: Naseem Galvez MD;  [facility];  Service: Gynecology Oncology    [place] LAPAROSCOPY W TOT HYSTERECTUTERUS <=[address]  W TUBE/OVARY N/A 6/20/2017    Procedure: ROBOTIC HYSTERECTOMY; BILATERAL SALPINO-OOPHERECTOMY; umbilical hernia repair ;  Surgeon: Naseem Galvez MD;  [facility];  Service: Gynecology Oncology    TONSILECTOMY AND ADNOIDECTOMY      UMBILICAL HERNIA REPAIR         Current Outpatient Medications   Medication Sig Dispense Refill    aspirin 81 MG tablet Take 81 mg by mouth daily      B-D UF III MINI PEN NEEDLES 31G X 5 MM MISC       BAQSIMI TWO PACK 3 MG/DOSE POWD Use as directed  0    cholecalciferol (VITAMIN D3) 1,000 units tablet Take 2,000 Units by mouth daily      diclofenac sodium (VOLTAREN) 1 % Apply 2 g topically 4 (four) times a day 1 Tube 0    diltiazem (TIAZAC) 300 MG 24 hr capsule Take 300 mg by mouth daily      enalapril (VASOTEC) 20 mg tablet Take 20 mg by mouth daily      insulin aspart (NovoLOG) 100 units/mL injection Inject 12 Units under the skin 3 (three) times a day before meals (Patient taking differently: Inject under the skin 3 (three) times a day before meals )  0    Insulin Disposable Pump (OmniPod Dash 5 Pack Pods) MISC USE 1 POD EVERY 1 5 DAYS      Insulin Disposable Pump (OMNIPOD DASH 5 PACK) MISC CHANGE PODS EVERY 2 DAYS UTD  3    isosorbide mononitrate (IMDUR) 60 mg 24 hr tablet Take 60 mg by mouth daily at bedtime      levothyroxine 50 mcg tablet Take 50 mcg by mouth daily      naloxone (NARCAN) 4 mg/0 1 mL nasal spray Administer 1 spray into a nostril  If no response after 2-3 minutes, give another dose in the other nostril using a new spray  1 each 1    pregabalin (LYRICA) 25 mg capsule Take 1 capsule (25 mg total) by mouth daily at bedtime Take with 50mg at night to make it 75mg qhs, morning dose remains to be 50mg 30 capsule 0    pregabalin (LYRICA) 50 mg capsule Take 1 capsule (50 mg total) by mouth 2 (two) times a day 30 capsule 0    rosuvastatin (CRESTOR) 20 MG tablet Take 20 mg by mouth every evening  2    valACYclovir (VALTREX) 1,000 mg tablet Take 1 tablet (1,000 mg total) by mouth 2 (two) times a day for 7 days 14 tablet 0    albuterol (2 5 mg/3 mL) 0 083 % nebulizer solution Take 3 mL (2 5 mg total) by nebulization every 6 (six) hours as needed for wheezing (Patient not taking: Reported on 7/15/2021) 75 mL 0    albuterol (PROAIR HFA) 90 mcg/act inhaler Inhale 2 puffs every 6 (six) hours as needed  (Patient not taking: Reported on 7/15/2021)      ALPRAZolam (XANAX) 0 5 mg tablet Take 1 tablet (0 5 mg total) by mouth 2 (two) times a day as needed for anxiety (Patient not taking: Reported on 7/20/2021) 60 tablet 1    betamethasone dipropionate 0 05 % lotion  (Patient not taking: Reported on 7/15/2021)      cyclobenzaprine (FLEXERIL) 5 mg tablet Take 1 tablet (5 mg total) by mouth daily at bedtime for 14 days 14 tablet 0    docusate sodium (COLACE) 100 mg capsule Take 1 capsule (100 mg total) by mouth 2 (two) times a day (Patient not taking: Reported on 7/15/2021) 20 capsule 0    nitroglycerin (NITROLINGUAL) 0 4 mg/spray spray USE ONE SPRAY Q 5 MINUTES AS NEEDED FOR CHEST PAIN  IF NO RELIEF, CALL 911   (Patient not taking: Reported on 7/15/2021)  1    simethicone (MYLICON,GAS-X) 181 MG capsule Take 1 capsule (180 mg total) by mouth every 12 (twelve) hours (Patient not taking: Reported on 7/15/2021) 20 capsule 0     No current facility-administered medications for this visit  Allergies   Allergen Reactions    Molds & Smuts Allergic Rhinitis    Other Allergic Rhinitis     RAGWEED, CAT DANDER, DOG DANDER     Pollen Extract Other (See Comments)     Cold symptoms         Review of Systems    There were no vitals filed for this visit  I spent  15 minutes minutes with patient today in which greater than 50% of the time was spent in counseling/coordination of care regarding Assessment, management,  complications  VIRTUAL VISIT DISCLAIMER      Selvin Preston verbally agrees to participate in [employer]  Pt is aware that [employer] could be limited without vital signs or the ability to perform a full hands-on physical Makinen Block understands she or the provider may request at any time to terminate the video visit and request the patient to seek care or treatment in person

## 2021-07-27 NOTE — ASSESSMENT & PLAN NOTE
Patient did not take of Valtrex yesterday  However she has been asked to complete the 7 day course of Valtrex as per recommendations  As the rash is improving will not get any further PCR testing at site, however will monitor for further workup  Have informed the patient to let us know if she has any new eye, ear issues which were not present  She also states that she has no feeling  In her legs when she lies down however it is present when she stands or sits  Due to sensation being present it is unlikely that her CNS is affected such as myelitis, however as this is abnormal  And could be due to medication side effect, will follow-up with her on Friday after completion of her Valtrex  She states for pain medication she does not wish to increase her Lyrica as she already does not feel like herself  She has been advised that she can cut down on Lyrica from 75 b i d  to 50 b i d  should she require less pain relief  However at this time she has decided to maintain 75 b i d  as she does have significant pain  She does not wish to pursue other pain medications at this time as they can further worsen her sense of self,  She states

## 2021-07-30 ENCOUNTER — TELEPHONE (OUTPATIENT)
Dept: INTERNAL MEDICINE CLINIC | Facility: CLINIC | Age: 76
End: 2021-07-30

## 2021-07-30 NOTE — TELEPHONE ENCOUNTER
Patient is still having pain from the shingles  No new lesions for at least 4 days or longer  Patient is asking if she should be taking the Lyrica  If so patient will need a new script for this medication            Please advise

## 2021-08-02 DIAGNOSIS — B02.29 POST HERPETIC NEURALGIA: ICD-10-CM

## 2021-08-02 NOTE — TELEPHONE ENCOUNTER
Patient still having pain from shingles  Did you want her to continue taking the Lyrica? 75mg or increase dose? If so, she needs refills, she is completely out  Patient still has lesions, still itching and trouble sleeping  Please advise

## 2021-08-03 ENCOUNTER — TELEPHONE (OUTPATIENT)
Dept: INTERNAL MEDICINE CLINIC | Facility: CLINIC | Age: 76
End: 2021-08-03

## 2021-08-03 ENCOUNTER — TELEPHONE (OUTPATIENT)
Dept: PSYCHIATRY | Facility: CLINIC | Age: 76
End: 2021-08-03

## 2021-08-03 DIAGNOSIS — B02.29 POST HERPETIC NEURALGIA: ICD-10-CM

## 2021-08-03 NOTE — TELEPHONE ENCOUNTER
Patient was taking Lyrica 75 mg daily per encounter note/Dr Amy Aparicio on 7/30/21  Please advise  Patient needs both doses

## 2021-08-03 NOTE — TELEPHONE ENCOUNTER
Advised patient  States she would like to continue 75mg on the Lyrica  [facility] sent a request to get refills for patient

## 2021-08-03 NOTE — TELEPHONE ENCOUNTER
Pt called to advise she needs a prescription sent to her pharmacy   She said she cannot take the Alprazolam with the Lyrica because of a drug interaction so she asked for something else that is safe to take w/ the Lyrica

## 2021-08-03 NOTE — TELEPHONE ENCOUNTER
Aron Jorgensen called to cancel for 8/6 apt with Shasta Garcia  She said she has a case of the shingles and does not want to go out in public  She is unable to have a virtual visit

## 2021-08-04 RX ORDER — PREGABALIN 25 MG/1
CAPSULE ORAL
Qty: 30 CAPSULE | Refills: 0 | Status: SHIPPED | OUTPATIENT
Start: 2021-08-04 | End: 2021-08-05

## 2021-08-04 RX ORDER — PREGABALIN 50 MG/1
50 CAPSULE ORAL 2 TIMES DAILY
Qty: 30 CAPSULE | Refills: 0 | Status: SHIPPED | OUTPATIENT
Start: 2021-08-04 | End: 2021-08-05

## 2021-08-04 NOTE — TELEPHONE ENCOUNTER
PDMp has been checked for both the 25 and 75 mg dose  Last fill 50 mg 7/16/21 #30, Last fill 25 mg 7/20/21 #30   Patient is taking 75 mg at bedtime and 50 mg in the morning

## 2021-08-05 ENCOUNTER — DOCUMENTATION (OUTPATIENT)
Dept: FAMILY MEDICINE CLINIC | Facility: CLINIC | Age: 76
End: 2021-08-05

## 2021-08-05 NOTE — PROGRESS NOTES
[address] [facility] Jonathan Caraballo, LisaD    Communication with patient: Yes, Phone call with patient    Reason for documentation: chart review for potential drug interaction; duplicate encounter for tracking purposes    Findings: Patient stopped alprazolam 2 weeks ago due to potential drug interaction with Lyrica  She also stopped Lyrica on Monday due to running out of medication  Since stopping medications patient's anxiety has worsened and she cannot sleep  Recommendations: If PCP is in agreement recommend to:  · Discontinue lyrica since patient stopped medication on Monday and did not find it helpful in relieving shingles pain  · Re-start alprazolam 0 5 mg BID PRN (patient still has 2 week supply remaining at home)  · Pt to reschedule with psychiatry to start [facility] as planned with goal to taper down or off alprazolam completely     Subjective/Objective:   Patient reports she stopped alprazolam completely for ~2 weeks  She stopped it due to potential drug interaction with the lyrica (increased risk for respiratory depression)  At first she did not notice a change in anxiety symptoms however over the last couple days her anxiety has worsened  She has not been able to sleep, feel restless (pacing around home), and feels short of breath  Pt reports she also stopped lyrica completely due to running out of refills on medication  Last dose was 25 mg on Monday  Has not taken any Lyrica since Monday  Before stopping Lyrica on Monday she was taking 75 mg BID  Patient states it is questionable how much it was helping  States she still has the rash and some pain but this has not worsened since stopping the Lyrica  Patient reports she does not want to continue the Lyrica at this time  Patient was supposed to follow up with psychiatry regarding starting fetzima for anxiety however she had to cancel her apt due to shingles   Advised patient to reschedule with psychiatry    Med rec:   Performed med rec with patient  She is not taking flexeril (therapy previously completed) or simethicone (never used and has not needed)   Medications removed from med list      Patient follows with Psych     PCP: Alexandrea Reis DO    Demographics  Interaction Method: Phone  Type of Intervention: New    Topic(s) Addressed  Medication Management    Intervention(s) Made    Pharmacologic:  Medication Initiation    Medication Discontinuation    Drug Interaction    Tool(s) Used  Other    Time Spent:   Time Spent in Direct Patient Care: 20 minutes    Time Spent in Care Coordination: 15 minutes    Recommendation(s) Accepted by the Patient/Caregiver: Not Applicable

## 2021-08-05 NOTE — TELEPHONE ENCOUNTER
Discussed plan with patient  She does plan to reach out to psychiatrist to reschedule  She will restart alprazolam 0 5 mg BID as needed  Lyrica removed from medication list  Advised she reached out to office if shingles pain worsens

## 2021-08-05 NOTE — TELEPHONE ENCOUNTER
[address] [facility]  Piero Damon, PharmD    Communication with patient: Yes, Phone call with patient    Reason for documentation: chart review for potential drug interaction with alprazolam and lyrica    Findings: Patient stopped alprazolam 2 weeks ago due to potential drug interaction with Lyrica  She also stopped Lyrica on Monday 8/2/21 due to running out of medication  Since stopping medications patient's anxiety has worsened and she cannot sleep  Recommendations: If PCP is in agreement recommend to:  · Discontinue lyrica since patient stopped medication on Monday and did not find it helpful in relieving shingles pain  · Re-start alprazolam 0 5 mg BID PRN (patient still has ~ 2 week supply remaining at home)  · Pt to reschedule with psychiatry to start [facility] as planned with goal to taper down or off alprazolam completely     Subjective/Objective:   Patient reports she stopped alprazolam completely for ~2 weeks  She stopped it due to potential drug interaction with the lyrica (increased risk for respiratory depression)  At first she did not notice a change in anxiety symptoms however over the last couple days her anxiety has worsened  She has not been able to sleep, feels restless (pacing around home), and feels short of breath  Pt reports she also stopped lyrica completely due to running out of refills on medication  Last dose was 25 mg on Monday  Has not taken any Lyrica since Monday  Before stopping Lyrica on Monday she was taking 75 mg BID  Patient states it is questionable how much it was helping  States she still has the rash and some pain but this has not worsened since stopping the Lyrica  Patient reports she does not want to continue the Lyrica at this time  Patient was supposed to follow up with psychiatry regarding starting fetzima for anxiety however she had to cancel her apt due to shingles   Advised patient to reschedule with psychiatry    Med rec: Performed med rec with patient  She is not taking flexeril (therapy previously completed) or simethicone (never used and has not needed)   Medications removed from med list      PCP: Devan Tapia, DO

## 2021-08-09 ENCOUNTER — TELEPHONE (OUTPATIENT)
Dept: INTERNAL MEDICINE CLINIC | Facility: CLINIC | Age: 76
End: 2021-08-09

## 2021-08-09 NOTE — TELEPHONE ENCOUNTER
Patient called clinical pharmacist's line stating that her shingles have improved since the last time that PCP saw her however they have not completely gone away and are still bothering her  Pt states that the itching continues to keep her up at night even with the alprazolam which has been helping for sleep  Wakes up every couple of hours due to itching  Patient did not feel lyrica helped her neuropathic pain  Gabapentin made patient feel groggy  She wants to know if there is something else you can do for her  Patient had also tried Tylenol with codeine with little relief  Currently she is using topical lidocaine that does provide some relief  At this point patient has tried and failed several options  Please advise if you have any other recommendations or would like the patient to schedule an in person apt to discuss further

## 2021-08-10 ENCOUNTER — OFFICE VISIT (OUTPATIENT)
Dept: INTERNAL MEDICINE CLINIC | Facility: CLINIC | Age: 76
End: 2021-08-10
Payer: COMMERCIAL

## 2021-08-10 VITALS
OXYGEN SATURATION: 97 % | RESPIRATION RATE: 16 BRPM | HEART RATE: 67 BPM | SYSTOLIC BLOOD PRESSURE: 134 MMHG | HEIGHT: 61 IN | BODY MASS INDEX: 37.23 KG/M2 | DIASTOLIC BLOOD PRESSURE: 74 MMHG | WEIGHT: 197.2 LBS

## 2021-08-10 DIAGNOSIS — B02.29 POST HERPETIC NEURALGIA: Primary | ICD-10-CM

## 2021-08-10 DIAGNOSIS — R06.02 SOB (SHORTNESS OF BREATH): ICD-10-CM

## 2021-08-10 PROCEDURE — 99213 OFFICE O/P EST LOW 20 MIN: CPT | Performed by: INTERNAL MEDICINE

## 2021-08-10 PROCEDURE — 3008F BODY MASS INDEX DOCD: CPT | Performed by: GENERAL ACUTE CARE HOSPITAL

## 2021-08-10 NOTE — ASSESSMENT & PLAN NOTE
She has been treated x2 courses of Valtrex patient does not have any active vesicles at this point time her symptoms are compatible with post herpetic neuralgia she has seen ophthalmology and no eye involvement she reports me she has ongoing symptoms and no improvement with Lyrica therefore will discontinue Lyrica and start gabapentin  100 mg 1 p o  q h s  she will call in 3 days for dose adjustment she understands the risk of Xanax interacting with gabapentin and would like to hold off and hold the  Xanax    The gabapentin may also help with sleep and also her anxiety  Patient to call in 3 days with update on her condition

## 2021-08-10 NOTE — PROGRESS NOTES
Assessment/Plan:    Post herpetic neuralgia   She has been treated x2 courses of Valtrex patient does not have any active vesicles at this point time her symptoms are compatible with post herpetic neuralgia she has seen ophthalmology and no eye involvement she reports me she has ongoing symptoms and no improvement with Lyrica therefore will discontinue Lyrica and start gabapentin  100 mg 1 p o  q h s  she will call in 3 days for dose adjustment she understands the risk of Xanax interacting with gabapentin and would like to hold off and hold the  Xanax  The gabapentin may also help with sleep and also her anxiety  Patient to call in 3 days with update on her condition    SOB (shortness of breath)   Does not appear to have any respiratory distress related to anxiety I did ask her to follow-up with Cardiology         Problem List Items Addressed This Visit        Nervous and Auditory    Post herpetic neuralgia - Primary      She has been treated x2 courses of Valtrex patient does not have any active vesicles at this point time her symptoms are compatible with post herpetic neuralgia she has seen ophthalmology and no eye involvement she reports me she has ongoing symptoms and no improvement with Lyrica therefore will discontinue Lyrica and start gabapentin  100 mg 1 p o  q h s  she will call in 3 days for dose adjustment she understands the risk of Xanax interacting with gabapentin and would like to hold off and hold the  Xanax    The gabapentin may also help with sleep and also her anxiety  Patient to call in 3 days with update on her condition         Relevant Orders    Ambulatory referral to Pain Management       Other    SOB (shortness of breath)      Does not appear to have any respiratory distress related to anxiety I did ask her to follow-up with Cardiology         Relevant Orders    Ambulatory referral to Cardiology           RTO in 4 weeks call if any problems  Subjective:      Patient ID: Neyda Nawaf is a 77 y o  female  HPI  44-year-old female chief complaint post herpetic neuralgia, status post shingles con anxiety/ shortness of breath; patient reports me ongoing symptoms of pain no further vesicles reports me anxiety related to pain can she is anxious she does feel short of breath  No chest painreport me severe pain date the right neck right shoulder and also posterior occipital region of the scalp starting better able reports me a soreness of the right ear also there is no effective her hearing he reports me chills without fever he has had 2 rounds of valacyclovir reports me clear without limited help; using tylenol for right side  Tension type ha tylenol and alprazolam helps with sleeping  Had the eye checked no shingles in the eye     The following portions of the patient's history were reviewed and updated as appropriate: allergies, current medications, past family history, past medical history, past social history, past surgical history and problem list     Review of Systems   Constitutional: Negative for chills and fever  HENT: Negative for congestion  Respiratory: Positive for shortness of breath  Negative for cough and wheezing  Cardiovascular: Negative for chest pain  Musculoskeletal:        Right posterior scalp pain, right neck pain, right trapezius pain repleted to post herpetic neuralgia   Psychiatric/Behavioral: Positive for sleep disturbance  Negative for suicidal ideas  The patient is nervous/anxious  Objective:    No follow-ups on file  No results found        Allergies   Allergen Reactions    Molds & Smuts Allergic Rhinitis    Other Allergic Rhinitis     RAGWEED, CAT DANDER, DOG DANDER     Pollen Extract Other (See Comments)     Cold symptoms         Past Medical History:   Diagnosis Date    Acute embolism and thrombosis of unspecified deep veins of unspecified lower extremity (HCC)     Last Assessed:  5/18/17    Anemia     Anosmia     Anxiety     Arthritis  Asthma     Back pain     Bilateral macular retinal edema     CAD (coronary artery disease)     Cataract     Cervical disc herniation     Cervical radiculopathy     Cervical spinal stenosis     Cervical spondylolysis     Chronic mastoiditis     Complex endometrial hyperplasia     Depression     Diabetes mellitus ([address] )     [facility])     Fibromyalgia     Hyperlipidemia     Hypertension     Hypothyroid     Lumbar radiculopathy     Neck pain     Obese     Spinal stenosis     Stomach ulcer     Thyroid disease      Past Surgical History:   Procedure Laterality Date    BACK SURGERY      CARPAL TUNNEL RELEASE      CATARACT EXTRACTION      CHOLECYSTECTOMY      COLONOSCOPY      CORONARY ANGIOPLASTY      CORONARY ARTERY BYPASS GRAFT      CYSTOSCOPY N/A 6/20/2017    Procedure: [place];  Surgeon: Oly Murphy MD;  [facility];  Service: Gynecology Oncology    NV LAPAROSCOPY W TOT HYSTERECTUTERUS <=[address]  W TUBE/OVARY N/A 6/20/2017    Procedure: ROBOTIC HYSTERECTOMY; BILATERAL SALPINO-OOPHERECTOMY; umbilical hernia repair ;  Surgeon: Oly Murphy MD;  [facility];  Service: Gynecology Oncology    TONSILECTOMY AND ADNOIDECTOMY      UMBILICAL HERNIA REPAIR       Current Outpatient Medications on File Prior to Visit   Medication Sig Dispense Refill    albuterol (2 5 mg/3 mL) 0 083 % nebulizer solution Take 3 mL (2 5 mg total) by nebulization every 6 (six) hours as needed for wheezing (Patient not taking: Reported on 7/15/2021) 75 mL 0    albuterol (PROAIR HFA) 90 mcg/act inhaler Inhale 2 puffs every 6 (six) hours as needed  (Patient not taking: Reported on 7/15/2021)      ALPRAZolam (XANAX) 0 5 mg tablet Take 1 tablet (0 5 mg total) by mouth 2 (two) times a day as needed for anxiety (Patient not taking: Reported on 7/20/2021) 60 tablet 1    aspirin 81 MG tablet Take 81 mg by mouth daily      B-D UF III MINI PEN NEEDLES 31G X 5 MM MISC       BAQSIMI TWO PACK 3 MG/DOSE POWD Use as directed (Patient not taking: Reported on 8/5/2021)  0    betamethasone dipropionate 0 05 % lotion  (Patient not taking: Reported on 7/15/2021)      cholecalciferol (VITAMIN D3) 1,000 units tablet Take 2,000 Units by mouth daily      diclofenac sodium (VOLTAREN) 1 % Apply 2 g topically 4 (four) times a day 1 Tube 0    diltiazem (TIAZAC) 300 MG 24 hr capsule Take 300 mg by mouth daily      docusate sodium (COLACE) 100 mg capsule Take 1 capsule (100 mg total) by mouth 2 (two) times a day (Patient not taking: Reported on 7/15/2021) 20 capsule 0    enalapril (VASOTEC) 20 mg tablet Take 20 mg by mouth daily      insulin aspart (NovoLOG) 100 units/mL injection Inject 12 Units under the skin 3 (three) times a day before meals (Patient taking differently: Inject under the skin 3 (three) times a day before meals )  0    Insulin Disposable Pump (OmniPod Dash 5 Pack Pods) MISC USE 1 POD EVERY 1 5 DAYS      Insulin Disposable Pump (OMNIPOD DASH 5 PACK) MISC CHANGE PODS EVERY 2 DAYS UTD  3    isosorbide mononitrate (IMDUR) 60 mg 24 hr tablet Take 60 mg by mouth daily at bedtime      levothyroxine 50 mcg tablet Take 50 mcg by mouth daily      naloxone (NARCAN) 4 mg/0 1 mL nasal spray Administer 1 spray into a nostril  If no response after 2-3 minutes, give another dose in the other nostril using a new spray  (Patient not taking: Reported on 8/5/2021) 1 each 1    nitroglycerin (NITROLINGUAL) 0 4 mg/spray spray USE ONE SPRAY Q 5 MINUTES AS NEEDED FOR CHEST PAIN  IF NO RELIEF, CALL 911  (Patient not taking: Reported on 7/15/2021)  1    rosuvastatin (CRESTOR) 20 MG tablet Take 20 mg by mouth every evening  2    valACYclovir (VALTREX) 1,000 mg tablet Take 1 tablet (1,000 mg total) by mouth 2 (two) times a day for 7 days 14 tablet 0     No current facility-administered medications on file prior to visit       Family History   Problem Relation Age of Onset    Arthritis Mother     Leukemia Mother     Other Mother         Anxiety, major depressive disorder, recurrent episode with atypical features    Coronary artery disease Father         Heart problem    Diabetes Father     Other Father         Infectious disease    Alzheimer's disease Maternal Grandmother     Other Maternal Grandfather         Heart problem    Other Daughter         Anxiety, major depressive disorder, recurrent episode with atypical features    Alcohol abuse Other         Grandparent    Cancer Family     Diabetes Family     Hypertension Family     Other Family         Reported prior back trouble, thyroid disorder     Social History     Socioeconomic History    Marital status: /Civil Union     Spouse name: Not on file    Number of children: 2    Years of education: High school or GED    Highest education level: Not on file   Occupational History    Occupation: Retired   Tobacco Use    Smoking status: Former Smoker     Packs/day: 1 00     Years: 6 00     Pack years: 6 00     Types: Cigarettes     Quit date:      Years since quittin 6    Smokeless tobacco: Never Used    Tobacco comment: Smoked about a half a pack for about 4 yrs  Quite about 50 yrs ago  Vaping Use    Vaping Use: Never used   Substance and Sexual Activity    Alcohol use: No    Drug use: No    Sexual activity: Never     Comment: No known STD risk factors   Other Topics Concern    Not on file   Social History Narrative    Lives independently with spouse    No known risk factors    Denied:  Exercising regularly     Social Determinants of Health     Financial Resource Strain:     Difficulty of Paying Living Expenses:    Food Insecurity:     Worried About Running Out of Food in the Last Year:     [address] in the Last Year:    Transportation Needs:     Lack of Transportation (Medical):      Lack of Transportation (Non-Medical):    Physical Activity:     Days of Exercise per Week:     Minutes of Exercise per Session:    Stress:     Feeling of Stress :    Social Connections:     Frequency of Communication with Friends and Family:     Frequency of Social Gatherings with Friends and Family:     Attends Sabianist Services:     Active Member of Clubs or Organizations:     Attends Club or Organization Meetings:     Marital Status:    Intimate Partner Violence:     Fear of Current or Ex-Partner:     Emotionally Abused:     Physically Abused:     Sexually Abused:      Vitals:    08/10/21 1140   BP: 134/74   Pulse: 67   Resp: 16   SpO2: 97%   Weight: 89 4 kg (197 lb 3 2 oz)   Height: 5' 1" (1 549 m)     Results for orders placed or performed in visit on 16/10/27   Basic metabolic panel   Result Value Ref Range    Sodium 137 136 - 145 mmol/L    Potassium 4 7 3 5 - 5 3 mmol/L    Chloride 102 100 - 108 mmol/L    CO2 24 21 - 32 mmol/L    ANION GAP 11 4 - 13 mmol/L    BUN 21 5 - 25 mg/dL    Creatinine 1 64 (H) 0 60 - 1 30 mg/dL    Glucose 188 (H) 65 - 140 mg/dL    Calcium 9 5 8 3 - 10 1 mg/dL    eGFR 31 ml/min/1 73sq m     Weight (last 2 days)     Date/Time   Weight    08/10/21 1140   89 4 (197 2)            Body mass index is 37 26 kg/m²  BP      Temp      Pulse     Resp      SpO2        Vitals:    08/10/21 1140   Weight: 89 4 kg (197 lb 3 2 oz)     Vitals:    08/10/21 1140   Weight: 89 4 kg (197 lb 3 2 oz)       /74   Pulse 67   Resp 16   Ht 5' 1" (1 549 m)   Wt 89 4 kg (197 lb 3 2 oz)   SpO2 97%   BMI 37 26 kg/m²          Physical Exam  Constitutional:       Appearance: She is well-developed  HENT:      Head: Normocephalic  Eyes:      General: No scleral icterus  Right eye: No discharge  Left eye: No discharge  Conjunctiva/sclera: Conjunctivae normal       Pupils: Pupils are equal, round, and reactive to light  Cardiovascular:      Rate and Rhythm: Normal rate and regular rhythm  Heart sounds: Normal heart sounds  No murmur heard  No friction rub  No gallop  Pulmonary:      Effort: No respiratory distress  Breath sounds: Normal breath sounds  No wheezing or rales  Musculoskeletal:         General: No deformity  Cervical back: Neck supple  Lymphadenopathy:      Cervical: No cervical adenopathy  Neurological:      Mental Status: She is alert  Coordination: Coordination normal    Psychiatric:         Mood and Affect: Mood is anxious  Mood is not depressed  Thought Content: Thought content does not include suicidal ideation          No vesicles there is some mild scarring from previous shingles right posterior neck

## 2021-08-10 NOTE — ASSESSMENT & PLAN NOTE
Does not appear to have any respiratory distress related to anxiety I did ask her to follow-up with Cardiology

## 2021-08-11 ENCOUNTER — RA CDI HCC (OUTPATIENT)
Dept: OTHER | Facility: HOSPITAL | Age: 76
End: 2021-08-11

## 2021-08-11 NOTE — PROGRESS NOTES
Based on clinical documentation indicated in your record, it appears that the patient may have the following conditions:    I73 9 Claudication     M46 96 Lumbar facet arthropathy        If this is correct, please document and assess at your next visit     [place] [id number]  coding opportunities                             Patients insurance company: [employer] (Medicare Advantage and Commercial)             [place] [id number]  coding opportunities          Number of diagnosis code(s) already on the problem list added to [place] fla                  Number of suggestions NOT actually used: 2     Patients insurance company: [employer] (Medicare Advantage and Commercial)     Visit status: Patient arrived for their scheduled appointment

## 2021-08-12 ENCOUNTER — TELEPHONE (OUTPATIENT)
Dept: PSYCHIATRY | Facility: CLINIC | Age: 76
End: 2021-08-12

## 2021-08-12 ENCOUNTER — TELEPHONE (OUTPATIENT)
Dept: INTERNAL MEDICINE CLINIC | Facility: CLINIC | Age: 76
End: 2021-08-12

## 2021-08-12 NOTE — TELEPHONE ENCOUNTER
Mayco called into the office hermelinda - she has not taken any medications for 6 weeks now, because she was told her meds would be changing  She said she is feeling terrible  She is scheduled to see Dr [place] 8/26 but she said she can't wait that long

## 2021-08-12 NOTE — TELEPHONE ENCOUNTER
Kira Mai in follow up of her phone call  Ghazala Arevalo states she was thinking of going to the ED due to severe anxiety  States she cannot stay still and is pacing  She has not had any of her medication for at least 6 weeks and stopped everything cold turkey  She said she just had a crying bout which lasted for an hour  Discussed different coping techniques and ways to try to calm down  Ghazala Arevalo had some obstacles with some of my suggestions but will wait until tomorrow to see Dr ZHANG [facility][note 2]  She will take an Alprazolam tonight to help her sleep  States she took a Neurontin and did not like the way it made her feel so not taking that again  She will hang in there and discuss with Dr ZHANG [facility][note 2] in the morning  Will refer to Dr ZHANG [facility][note 2] for review

## 2021-08-12 NOTE — TELEPHONE ENCOUNTER
I called and spoke with Isabella Hawkins to offer her an apt with Jose J Mtz tomorrow morning  She stated she can't wait that long, as she has been unable to sleep and cries all the time  She said she is going to the hospital today instead

## 2021-08-12 NOTE — TELEPHONE ENCOUNTER
Pt called to ask if you would send RX to her pharmacy for her anxiety and stress  The shingles are really stressing her out, she can't sleep and is anxious  She spoke to [facility] and [facility] and they suggested [address]  Please advise

## 2021-08-13 ENCOUNTER — OFFICE VISIT (OUTPATIENT)
Dept: PSYCHIATRY | Facility: CLINIC | Age: 76
End: 2021-08-13
Payer: COMMERCIAL

## 2021-08-13 DIAGNOSIS — F33.2 MDD (MAJOR DEPRESSIVE DISORDER), RECURRENT SEVERE, WITHOUT PSYCHOSIS (HCC): ICD-10-CM

## 2021-08-13 DIAGNOSIS — M79.7 FIBROMYALGIA: ICD-10-CM

## 2021-08-13 DIAGNOSIS — F41.0 PANIC ATTACKS: ICD-10-CM

## 2021-08-13 DIAGNOSIS — F41.1 GAD (GENERALIZED ANXIETY DISORDER): Primary | ICD-10-CM

## 2021-08-13 DIAGNOSIS — F41.9 ANXIETY: ICD-10-CM

## 2021-08-13 PROCEDURE — 90833 PSYTX W PT W E/M 30 MIN: CPT | Performed by: PSYCHIATRY & NEUROLOGY

## 2021-08-13 PROCEDURE — 99214 OFFICE O/P EST MOD 30 MIN: CPT | Performed by: PSYCHIATRY & NEUROLOGY

## 2021-08-13 RX ORDER — ALPRAZOLAM 0.5 MG/1
0.5 TABLET ORAL 2 TIMES DAILY PRN
Qty: 60 TABLET | Refills: 1 | Status: SHIPPED | OUTPATIENT
Start: 2021-08-13 | End: 2021-10-13 | Stop reason: SDUPTHER

## 2021-08-13 RX ORDER — TRAZODONE HYDROCHLORIDE 50 MG/1
25-50 TABLET ORAL
Qty: 30 TABLET | Refills: 1 | Status: SHIPPED | OUTPATIENT
Start: 2021-08-13 | End: 2021-08-26 | Stop reason: SDUPTHER

## 2021-08-13 NOTE — PSYCH
MEDICATION MANAGEMENT NOTE        [facility]      Name and Date of Birth: Ari Age 77 y o  1945    Date of Visit: August 13, 2021    SUBJECTIVE:  CC: Cj oCrdero presents today for follow up on depression and anxiety , "it has been horrible"  Cj Cordero has been dealing with shingles for weeks  Low appetite, diarrhea, shingles are on back, very intense 9/10  She is frustrated by her shingles treatment process  Discomfort in her R leg when trying to lie down  Whole leg neuropathy pain she notes  About the same  She wanted to talk to Dr Patricia Camarillo about fetzima, but the conversation went nowhere she feels and then she kept cancelling with me  She now feels it is time to act  She does not want cymbalta, still interested in [address] [place] [address]  She was also interested in trazodone, understands the drug interactions    She will see cardiologist at [facility] on [facility]rd, will get a copy of EKG for me to review  (in case we consider TCAs)      F/U PRN- PHP was ok, but when she left PHP "I got into the same old rut"  Only so much ability and so much to do at home, sot that is depressing for her and her    - [place] concerns from marriage when they were younger  She brought this up to  (he felt bad about, but not a lot of movement to help her 'in the now' related to their relationship and her mental health  Since our last visit, overall symptoms have been gradually worsening        Med Compliance: yes    HPI ROS:             ('was' notes: recent => remote)  Medication Side Effects:  no     Depression (10 worst):  9 (Was 8)   Anxiety (10 worst):  9-10 (Was 6-7)   Safety concerns (SI, HI, etc):  no (Was no)   Sleep: (NM = Nightmares)  poor, ~2hr last night (Was good)   Energy:  low (Was low)   Appetite:  low (Was eating ok)   Weight Change:  no      PHQ-9 Follow-up           PHQ-9 Score (since 7/13/2021)     None          [unfilled]    Cj Cordero denies any side effects from medications unless noted above    Review Of Systems as noted above  Otherwise A comprehensive review of systems was negative  History Review: The following portions of the patient's history were reviewed and documented: allergies, current medications, past family history, past medical history, past social history and problem list      Lab Review: Labs were reviewed and discussed with patient      OBJECTIVE:     MENTAL STATUS EXAM  Appearance:  age appropriate   Behavior:  pleasant, cooperative, with good eye contact   Speech:  Normal volume, regular rate and rhythm   Mood:  depressed and anxious   Affect:  mood congruent   Language: intact and appropriate for age, education, and intellect   Thought Process:  Linear and goal directed   Associations: intact associations   Thought Content:  normal and appropriate   Perceptual Disturbances: no auditory or visual hallcunations   Risk Potential / Abnormal Thoughts: Suicidal ideation - None  Homicidal ideation - None  Potential for aggression - No       Consciousness:  Alert & Awake   Sensorium:  Grossly oriented   Attention: attention span and concentration are age appropriate       Fund of Knowledge:  Memory: awareness of current events: yes  recent and remote memory grossly intact   Insight:  good   Judgment: good   Muscle Strength Muscle Tone: normal  normal   Gait/Station: Slow, limited swing   Motor Activity: no abnormal movements       Risks, Benefits And Possible Side Effects Of Medications:    AGREE: Risks, benefits, and possible side effects of medications explained to Stacy Janett and she (or legal representative) verbalizes understanding and agreement for treatment      Controlled Medication Discussion:     Stacy Rowland has been filling controlled prescriptions on time as prescribed according to [facility]    _____________________________________________________________    Recent labs:  No visits with results within 1 Month(s) from this visit  Latest known visit with results is:   Appointment on 05/17/2021   Component Date Value    Sodium 05/17/2021 137     Potassium 05/17/2021 4 7     Chloride 05/17/2021 102     CO2 05/17/2021 24     ANION GAP 05/17/2021 11     BUN 05/17/2021 21     Creatinine 05/17/2021 1 64*    Glucose 05/17/2021 188*    Calcium 05/17/2021 9 5     eGFR 05/17/2021 31          Psychiatric History  Previous diagnoses include depression, anxiety  Prior outpatient psychiatric treatment: no  Prior therapy: yes  Prior inpatient psychiatric treatment: no  PHP 2021 (some benefit, but waned)  Prior suicide attempts: no  Prior self harm: no  Prior violence or aggression: no     Social History:  The patient grew up in [place]  Childhood was described as "happy"      During childhood, parents were  Together  Only child     Abuse/neglect: none     As far as the patient (or present family member) is aware, overall childhood development: Patient does ascribe to normal developmental milestones such as walking, talking, potty training and making childhood friends      Education level: some college   Current occupation: h/o    Retired     Zoroastrian Affiliatio: Zoroastrianism   experience: no  Legal history: no  Access to Guns:  has a 22 rifle       Substance use and treatment:  Tobacco use: former  Caffeine Use: limited  ETOH use: no  Other substance use: no     Endorses previous experimentation with: no     Longest clean time: not applicable  History of Inpatient/Outpatient rehabilitation program: no        Traumatic History:      Abuse: none  Other Traumatic Events: none      Family Psychiatric History:      Psychiatric Illness:      Anxiety & Depression - mother, daughter  Substance Abuse:       no family history of substance abuse  Suicide Attempts:        no family history of suicide attempts     Denies bipolar disorder       Family History   Problem Relation Age of Onset    Arthritis Mother     Leukemia Mother     Other Mother         Anxiety, major depressive disorder, recurrent episode with atypical features    Coronary artery disease Father         Heart problem    Diabetes Father     Other Father         Infectious disease    Alzheimer's disease Maternal Grandmother     Other Maternal Grandfather         Heart problem    Other Daughter         Anxiety, major depressive disorder, recurrent episode with atypical features    Alcohol abuse Other         Grandparent    Cancer Family     Diabetes Family     Hypertension Family     Other Family         Reported prior back trouble, thyroid disorder         Medical / Surgical History:    Past Medical History:   Diagnosis Date    Acute embolism and thrombosis of unspecified deep veins of unspecified lower extremity (HCC)     Last Assessed:  5/18/17    Anemia     Anosmia     Anxiety     Arthritis     Asthma     Back pain     Bilateral macular retinal edema     CAD (coronary artery disease)     Cataract     Cervical disc herniation     Cervical radiculopathy     Cervical spinal stenosis     Cervical spondylolysis     Chronic mastoiditis     Complex endometrial hyperplasia     Depression     Diabetes mellitus ([address] )     DVT (deep venous thrombosis) ([address] )     Fibromyalgia     Hyperlipidemia     Hypertension     Hypothyroid     Lumbar radiculopathy     Neck pain     Obese     Spinal stenosis     Stomach ulcer     Thyroid disease      Past Surgical History:   Procedure Laterality Date    BACK SURGERY      CARPAL TUNNEL RELEASE      CATARACT EXTRACTION      CHOLECYSTECTOMY      COLONOSCOPY      CORONARY ANGIOPLASTY      CORONARY ARTERY BYPASS GRAFT      CYSTOSCOPY N/A 6/20/2017    Procedure: CYSTOSCOPY;  Surgeon: Octavio Awan MD;  [facility];  Service: Gynecology Oncology    VT LAPAROSCOPY W TOT HYSTERECTUTERUS <=250 GRAM  W TUBE/OVARY N/A 6/20/2017    Procedure: ROBOTIC HYSTERECTOMY; BILATERAL SALPINO-OOPHERECTOMY; umbilical hernia repair ;  Surgeon: Josie Rabago MD;  [facility];  Service: Gynecology Oncology    TONSILECTOMY AND ADNOIDECTOMY      UMBILICAL HERNIA REPAIR         Assessment/Plan:        Diagnoses and all orders for this visit:    MADAN (generalized anxiety disorder)  -     Levomilnacipran HCl ER (Fetzima) 20 MG extended release capsule; Take 1 capsule (20 mg total) by mouth daily  -     traZODone (DESYREL) 50 mg tablet; Take 0 5-1 tablets (25-50 mg total) by mouth daily at bedtime as needed for sleep  -     ALPRAZolam (XANAX) 0 5 mg tablet; Take 1 tablet (0 5 mg total) by mouth 2 (two) times a day as needed for anxiety    Anxiety  -     Levomilnacipran HCl ER (Fetzima) 20 MG extended release capsule; Take 1 capsule (20 mg total) by mouth daily  -     ALPRAZolam (XANAX) 0 5 mg tablet; Take 1 tablet (0 5 mg total) by mouth 2 (two) times a day as needed for anxiety    MDD (major depressive disorder), recurrent severe, without psychosis (HCC)  -     Levomilnacipran HCl ER (Fetzima) 20 MG extended release capsule; Take 1 capsule (20 mg total) by mouth daily  -     traZODone (DESYREL) 50 mg tablet; Take 0 5-1 tablets (25-50 mg total) by mouth daily at bedtime as needed for sleep    Panic attacks  -     Levomilnacipran HCl ER (Fetzima) 20 MG extended release capsule; Take 1 capsule (20 mg total) by mouth daily  -     traZODone (DESYREL) 50 mg tablet; Take 0 5-1 tablets (25-50 mg total) by mouth daily at bedtime as needed for sleep  -     ALPRAZolam (XANAX) 0 5 mg tablet; Take 1 tablet (0 5 mg total) by mouth 2 (two) times a day as needed for anxiety    Fibromyalgia  -     Levomilnacipran HCl ER (Fetzima) 20 MG extended release capsule;  Take 1 capsule (20 mg total) by mouth daily        ______________________________________________________________________  MDD  MADAN  Panic Attacks (R/O Disorder)     MDD- not at goal  MADAN- not at goal  Panic Attacks - xanax manages adequately    Andrés Alvarez did well in [address], but benefits waned  She is ready for fetzima (likely 50% max dosing, considering renal function)  While not for pain per se, is sister to Xi, and other SNRIs have pain benefit  MDD and MADAN focus of my efforts, but hope to manage something that helps her globally  She was scared of lamictal, never took  Could consider SGA or other direction  Gabapentin she is avoidant of due to history     We had discussed [facility], she was not interested (revisit PRN)    CrCl ~31    From a biological perspective, her Kidney function is declining  She has SARAY (cannot tolerate CPAP or treatment), and other medical issues       Suicide / Homicide / Safety risk assessment: see above  safety risk low overall upon consideration of protective and risk factors  Additional Assessment:    Previous psychotropic medication trials:   cymbalta since ~2018  Tried 90mg but GI upset too much  buspar - 3 days, but stopped xanax at same time  Crying  Likely not from medication - 8/31/20 to 12/2020 took 10mg BID, then stopped on own again due to stating it was causing crying spells  Zoloft- 100mg for a few years, no side effects, not sure if helped  Valium - helped  lexapro (ordered, but did not really try because fibromyalgia worse when stopping cymbalta)   Gabapentin years ago, briefly - felt groggy on it, fell asleep behind the wheel; 'feels loopy' in past (in 8/2021 did not have issue, but very low dose, chose to stop)    Scales:         Treatment Plan:        Patient has been educated about their diagnosis and treatment modalities  They voiced understanding and agreement with the following plan:    1) MEDS:         Discussed medications and if treatment adjustment was needed/desired  - Xanax 0 5mg BID PRN (Dr Kelly Avila prescribed last) - I TOOK OVER 8/13/2021   - START fetzima 20mg daily   PARQ completed including serotonin syndrome, SIADH, worsened depression/suicidality, blood pressure changes and GI distress, weight gain or loss of appetite, insomnia, sedation, potential for drug interactions, and others  Also discussed CKD considerations in use and dosing   - START Trazodone 25mg HS, (may increase to 50mg HS)  PARQ completed including dizziness, headache, GI distress, sedation, confusion, suicidal thoughts, serotonin syndrome, drug interactions waqar with fetzima, otherse  2) Labs: PRN     3) Therapy:    - Desi Vasques   - Referred to therapy - she will also look on own (I printed some names off Psychology today)   - [facility] (only wants face to face)    4) Medical:    - Pt will f/u with other providers as needed     5) Follow up:   - Follow up in 2-3wk   - Patient will call if issues or concerns      6) Treatment Plan:    - Enacted 4/23/2020, 8/31/2020, 1/13/2020  Treatment Plan not completed within required time limits due to:  presenting  with emotional/behavorial issues that required clinical intervention  @  was  very anxious and tearful during the appointment  Discussed  developing a treatment plan at their  next scheduled appointment and Nam Saavedra  is agreeable  Discussed self monitoring of symptoms, and symptom monitoring tools  Patient has been informed of 24 hours and weekend coverage for urgent situations accessed by calling the main clinic phone number               Psychotherapy in session:  Time spent performing psychotherapy: 16 minutes supportive therapy related to frustrations, health worries, self care

## 2021-08-13 NOTE — PSYCH
This note was not shared with the patient due to this is a psychotherapy note       Cristina Bai can be very indecisive, and self sabotaging inadvertently        5) Other: Support as needed   - ok relationship with , up and down   - mom's death in facility hard on her (6/2020)   - daughter struggling, has 3 kids total   - Children: 3

## 2021-08-18 ENCOUNTER — OFFICE VISIT (OUTPATIENT)
Dept: INTERNAL MEDICINE CLINIC | Facility: CLINIC | Age: 76
End: 2021-08-18
Payer: COMMERCIAL

## 2021-08-18 VITALS
WEIGHT: 196.2 LBS | HEIGHT: 61 IN | RESPIRATION RATE: 16 BRPM | DIASTOLIC BLOOD PRESSURE: 62 MMHG | HEART RATE: 72 BPM | SYSTOLIC BLOOD PRESSURE: 120 MMHG | BODY MASS INDEX: 37.04 KG/M2 | OXYGEN SATURATION: 98 %

## 2021-08-18 DIAGNOSIS — B02.9 HERPES ZOSTER WITHOUT COMPLICATION: ICD-10-CM

## 2021-08-18 DIAGNOSIS — R48.2 APRAXIA: Primary | ICD-10-CM

## 2021-08-18 PROCEDURE — 99213 OFFICE O/P EST LOW 20 MIN: CPT | Performed by: INTERNAL MEDICINE

## 2021-08-18 NOTE — PROGRESS NOTES
Assessment/Plan:    Apraxia   Headache, left upper extremity apraxia recent shingles check MRI of the brain    Herpes zoster without complication   No active vesicles patient does have significant post herpetic neuralgia she is sleeping better with trazodone and tolerating well she will be starting Fetzima ; if we do not see significant improvement in the next 2 weeks will  Consider starting opiate medicine patient to follow-up with Psychiatry near future         Problem List Items Addressed This Visit        Other    Herpes zoster without complication      No active vesicles patient does have significant post herpetic neuralgia she is sleeping better with trazodone and tolerating well she will be starting Fetzima ; if we do not see significant improvement in the next 2 weeks will  Consider starting opiate medicine patient to follow-up with Psychiatry near future         Apraxia - Primary      Headache, left upper extremity apraxia recent shingles check MRI of the brain         Relevant Orders    MRI brain wo contrast           RTO in 2-4 weeks call if any problems  Subjective:      Patient ID: Roscoe Galvan is a 77 y o  female  HPI  77-year old female coming in for a follow up office visit regarding herpes zoster, post herpetic neuralgia, apraxic and headache; The patient reports me compliant taking medications without untoward side effects the  The patient is here to review his medical condition, update me on the medical condition and the patient reports me no hospitalizations and no ER visits   me the same pain as compared to last visit she only tried the gabapentin for 1 day but because of potential side effects she had previously had at the higher dose and concerns that her daughter voiced to her she had stopped the medication she would subsequently seen her psychiatrist Dr [place] and was started on Fetzima he does report me she has been waiting to get this from the pharmacy they did have to order it she will be starting it and started on trazodone letter nighttime which is helping her sleep less anxiety during the day with improved sleep trazodone she is using lidocaine spray; she sees Derm Dr Daniel Levine, she is using the lidocaine patch 4%,pt will see Dr Fadi Walsh for ekg pain 8-9/10 avg anything that rubs worsens the sx , she can only lay on the left side she reports the finger tips feels numb and the tip of the tongue at times she is dropping    The following portions of the patient's history were reviewed and updated as appropriate: allergies, current medications, past family history, past medical history, past social history, past surgical history and problem list     Review of Systems   Constitutional: Negative for activity change, appetite change and unexpected weight change  Eyes: Negative for visual disturbance  Respiratory: Negative for cough and shortness of breath  Cardiovascular: Negative for chest pain  Gastrointestinal: Negative for abdominal pain, diarrhea, nausea and vomiting  Skin:        Skin and scalp pain   Neurological: Positive for numbness and headaches  Negative for dizziness and light-headedness  Objective:    No follow-ups on file  No results found        Allergies   Allergen Reactions    Molds & Smuts Allergic Rhinitis    Other Allergic Rhinitis     RAGWEED, CAT DANDER, DOG DANDER     Pollen Extract Other (See Comments)     Cold symptoms         Past Medical History:   Diagnosis Date    Acute embolism and thrombosis of unspecified deep veins of unspecified lower extremity (HCC)     Last Assessed:  5/18/17    Anemia     Anosmia     Anxiety     Arthritis     Asthma     Back pain     Bilateral macular retinal edema     CAD (coronary artery disease)     Cataract     Cervical disc herniation     Cervical radiculopathy     Cervical spinal stenosis     Cervical spondylolysis     Chronic mastoiditis     Complex endometrial hyperplasia     Depression     Diabetes mellitus ([facility] [address] )     DVT [facility]deep venous thrombosis) ([facility])     Fibromyalgia     Hyperlipidemia     Hypertension     Hypothyroid     Lumbar radiculopathy     Neck pain     Obese     Spinal stenosis     Stomach ulcer     Thyroid disease      Past Surgical History:   Procedure Laterality Date    BACK SURGERY      CARPAL TUNNEL RELEASE      CATARACT EXTRACTION      CHOLECYSTECTOMY      COLONOSCOPY      CORONARY ANGIOPLASTY      CORONARY ARTERY BYPASS GRAFT      CYSTOSCOPY N/A 6/20/2017    Procedure: Tamara Guise;  Surgeon: Litzy Talbert MD;  [facility];  Service: Gynecology Oncology    MO LAPAROSCOPY W TOT HYSTERECTUTERUS <=250 Laura Sender  W TUBE/OVARY N/A 6/20/2017    Procedure: ROBOTIC HYSTERECTOMY; BILATERAL SALPINO-OOPHERECTOMY; umbilical hernia repair ;  Surgeon: Litzy Talbert MD;  Location[facility];  Service: Gynecology Oncology    TONSILECTOMY AND ADNOIDECTOMY      UMBILICAL HERNIA REPAIR       Current Outpatient Medications on File Prior to Visit   Medication Sig Dispense Refill    ALPRAZolam (XANAX) 0 5 mg tablet Take 1 tablet (0 5 mg total) by mouth 2 (two) times a day as needed for anxiety 60 tablet 1    aspirin 81 MG tablet Take 81 mg by mouth daily      B-D UF III MINI PEN NEEDLES 31G X 5 MM MISC       cholecalciferol (VITAMIN D3) 1,000 units tablet Take 2,000 Units by mouth daily      diclofenac sodium (VOLTAREN) 1 % Apply 2 g topically 4 (four) times a day 1 Tube 0    diltiazem (TIAZAC) 300 MG 24 hr capsule Take 300 mg by mouth daily      enalapril (VASOTEC) 20 mg tablet Take 20 mg by mouth daily      insulin aspart (NovoLOG) 100 units/mL injection Inject 12 Units under the skin 3 (three) times a day before meals (Patient taking differently: Inject under the skin 3 (three) times a day before meals )  0    Insulin Disposable Pump (OmniPod Dash 5 Pack Pods) MISC USE 1 POD EVERY 1 5 DAYS      Insulin Disposable Pump (OMNIPOD DASH 5 PACK) MISC CHANGE PODS EVERY 2 DAYS UTD  3    isosorbide mononitrate (IMDUR) 60 mg 24 hr tablet Take 60 mg by mouth daily at bedtime      Levomilnacipran HCl ER (Fetzima) 20 MG extended release capsule Take 1 capsule (20 mg total) by mouth daily 30 capsule 1    levothyroxine 50 mcg tablet Take 50 mcg by mouth daily      rosuvastatin (CRESTOR) 20 MG tablet Take 20 mg by mouth every evening  2    traZODone (DESYREL) 50 mg tablet Take 0 5-1 tablets (25-50 mg total) by mouth daily at bedtime as needed for sleep 30 tablet 1    albuterol (2 5 mg/3 mL) 0 083 % nebulizer solution Take 3 mL (2 5 mg total) by nebulization every 6 (six) hours as needed for wheezing (Patient not taking: Reported on 7/15/2021) 75 mL 0    albuterol (PROAIR HFA) 90 mcg/act inhaler Inhale 2 puffs every 6 (six) hours as needed  (Patient not taking: Reported on 7/15/2021)      BAQSIMI TWO PACK 3 MG/DOSE POWD Use as directed (Patient not taking: Reported on 8/5/2021)  0    betamethasone dipropionate 0 05 % lotion  (Patient not taking: Reported on 7/15/2021)      docusate sodium (COLACE) 100 mg capsule Take 1 capsule (100 mg total) by mouth 2 (two) times a day (Patient not taking: Reported on 7/15/2021) 20 capsule 0    naloxone (NARCAN) 4 mg/0 1 mL nasal spray Administer 1 spray into a nostril  If no response after 2-3 minutes, give another dose in the other nostril using a new spray  (Patient not taking: Reported on 8/5/2021) 1 each 1    nitroglycerin (NITROLINGUAL) 0 4 mg/spray spray USE ONE SPRAY Q 5 MINUTES AS NEEDED FOR CHEST PAIN  IF NO RELIEF, CALL 911  (Patient not taking: Reported on 7/15/2021)  1    valACYclovir (VALTREX) 1,000 mg tablet Take 1 tablet (1,000 mg total) by mouth 2 (two) times a day for 7 days 14 tablet 0     No current facility-administered medications on file prior to visit       Family History   Problem Relation Age of Onset    Arthritis Mother     Leukemia Mother     Other Mother         Anxiety, major depressive disorder, recurrent episode with atypical features    Coronary artery disease Father         Heart problem    Diabetes Father     Other Father         Infectious disease    Alzheimer's disease Maternal Grandmother     Other Maternal Grandfather         Heart problem    Other Daughter         Anxiety, major depressive disorder, recurrent episode with atypical features    Alcohol abuse Other         Grandparent    Cancer Family     Diabetes Family     Hypertension Family     Other Family         Reported prior back trouble, thyroid disorder     Social History     Socioeconomic History    Marital status: /Civil Union     Spouse name: Not on file    Number of children: 2    Years of education: High school or GED    Highest education level: Not on file   Occupational History    Occupation: Retired   Tobacco Use    Smoking status: Former Smoker     Packs/day: 1 00     Years: 6 00     Pack years: 6      Types: Cigarettes     Quit date:      Years since quittin 6    Smokeless tobacco: Never Used    Tobacco comment: Smoked about a half a pack for about 4 yrs  Quite about 50 yrs ago  Vaping Use    Vaping Use: Never used   Substance and Sexual Activity    Alcohol use: No    Drug use: No    Sexual activity: Never     Comment: No known STD risk factors   Other Topics Concern    Not on file   Social History Narrative    Lives independently with spouse    No known risk factors    Denied:  Exercising regularly     Social Determinants of Health     Financial Resource Strain:     Difficulty of Paying Living Expenses:    Food Insecurity:     Worried About Running Out of Food in the Last Year:     [address] in the Last Year:    Transportation Needs:     Lack of Transportation (Medical):      Lack of Transportation (Non-Medical):    Physical Activity:     Days of Exercise per Week:     Minutes of Exercise per Session:    Stress:     Feeling of Stress :    Social Connections:     Frequency of Communication with Friends and Family:     Frequency of Social Gatherings with Friends and Family:     Attends [employer] Services:     Active Member of Clubs or Organizations:     Attends Club or Organization Meetings:     Marital Status:    Intimate Partner Violence:     Fear of Current or Ex-Partner:     Emotionally Abused:     Physically Abused:     Sexually Abused:      Vitals:    08/18/21 1209   BP: 120/62   Pulse: 72   Resp: 16   SpO2: 98%   Weight: 89 kg (196 lb 3 2 oz)   Height: 5' 1" (1 549 m)     Results for orders placed or performed in visit on 08/19/90   Basic metabolic panel   Result Value Ref Range    Sodium 137 136 - 145 mmol/L    Potassium 4 7 3 5 - 5 3 mmol/L    Chloride 102 100 - 108 mmol/L    CO2 24 21 - 32 mmol/L    ANION GAP 11 4 - 13 mmol/L    BUN 21 5 - 25 mg/dL    Creatinine 1 64 (H) 0 60 - 1 30 mg/dL    Glucose 188 (H) 65 - 140 mg/dL    Calcium 9 5 8 3 - 10 1 mg/dL    eGFR 31 ml/min/1 73sq m     Weight (last 2 days)     Date/Time   Weight    08/18/21 1209   89 (196 2)            Body mass index is 37 07 kg/m²  BP      Temp      Pulse     Resp      SpO2        Vitals:    08/18/21 1209   Weight: 89 kg (196 lb 3 2 oz)     Vitals:    08/18/21 1209   Weight: 89 kg (196 lb 3 2 oz)       /62   Pulse 72   Resp 16   Ht 5' 1" (1 549 m)   Wt 89 kg (196 lb 3 2 oz)   SpO2 98%   BMI 37 07 kg/m²        dried skin lesion posterior scalp   Physical Exam  Constitutional:       Appearance: She is well-developed  HENT:      Head: Normocephalic  Eyes:      General: No scleral icterus  Right eye: No discharge  Left eye: No discharge  Conjunctiva/sclera: Conjunctivae normal       Pupils: Pupils are equal, round, and reactive to light  Cardiovascular:      Rate and Rhythm: Normal rate and regular rhythm  Heart sounds: Normal heart sounds  No murmur heard  No friction rub  No gallop  Pulmonary:      Effort: No respiratory distress        Breath sounds: Normal breath sounds  No wheezing or rales  Abdominal:      General: Bowel sounds are normal  There is no distension  Palpations: Abdomen is soft  There is no mass  Tenderness: There is no abdominal tenderness  There is no guarding or rebound  Musculoskeletal:         General: No deformity  Cervical back: Neck supple  Lymphadenopathy:      Cervical: No cervical adenopathy  Neurological:      Mental Status: She is alert        Coordination: Coordination normal

## 2021-08-19 NOTE — ASSESSMENT & PLAN NOTE
No active vesicles patient does have significant post herpetic neuralgia she is sleeping better with trazodone and tolerating well she will be starting Fetzima ; if we do not see significant improvement in the next 2 weeks will  Consider starting opiate medicine patient to follow-up with Psychiatry near future

## 2021-08-26 ENCOUNTER — OFFICE VISIT (OUTPATIENT)
Dept: PSYCHIATRY | Facility: CLINIC | Age: 76
End: 2021-08-26
Payer: COMMERCIAL

## 2021-08-26 ENCOUNTER — TELEPHONE (OUTPATIENT)
Dept: INTERNAL MEDICINE CLINIC | Facility: CLINIC | Age: 76
End: 2021-08-26

## 2021-08-26 DIAGNOSIS — F41.0 PANIC ATTACKS: ICD-10-CM

## 2021-08-26 DIAGNOSIS — F33.2 MDD (MAJOR DEPRESSIVE DISORDER), RECURRENT SEVERE, WITHOUT PSYCHOSIS (HCC): ICD-10-CM

## 2021-08-26 DIAGNOSIS — M79.7 FIBROMYALGIA: ICD-10-CM

## 2021-08-26 DIAGNOSIS — F41.1 GAD (GENERALIZED ANXIETY DISORDER): ICD-10-CM

## 2021-08-26 DIAGNOSIS — F41.9 ANXIETY: ICD-10-CM

## 2021-08-26 PROCEDURE — 99214 OFFICE O/P EST MOD 30 MIN: CPT | Performed by: PSYCHIATRY & NEUROLOGY

## 2021-08-26 RX ORDER — TRAZODONE HYDROCHLORIDE 50 MG/1
25-50 TABLET ORAL
Qty: 30 TABLET | Refills: 1 | Status: SHIPPED | OUTPATIENT
Start: 2021-08-26 | End: 2021-09-07

## 2021-08-26 RX ORDER — ISOSORBIDE MONONITRATE 60 MG/1
TABLET, EXTENDED RELEASE ORAL
COMMUNITY
Start: 2021-08-23

## 2021-08-26 NOTE — TELEPHONE ENCOUNTER
Patient saw her psychiatrist today and he is recommending patient schedule an appointment with you  I see you have a few openings but not sure the time slots are long enough to see her        Please advise

## 2021-08-26 NOTE — BH TREATMENT PLAN
TREATMENT PLAN (Medication Management Only)        [employer]    Name/Date of Birth/MRN:  Dewey Talbot 77 y o  1945 MRN: [id number]  Date of Treatment Plan: August 26, 2021  Diagnosis/Diagnoses:   1  MDD (major depressive disorder), recurrent severe, without psychosis ([facility] )    2  MADAN (generalized anxiety disorder)    3  Panic attacks    4  Anxiety    5  Fibromyalgia      Strengths/Personal Resources for Self-Care: , determined, caring  Area/Areas of need (in own words): family struggles, finances  1  Long Term Goal: "to not be the person everyone goes to"   Target Date: 180 days from treatment plan  Person/Persons responsible for completion of goal: Dr Madeline Cano and Self  2  Short Term Objective (s) - How will we reach this goal?:   A  Provider new recommended medication/dosage changes and/or continue medication(s): as noted in chart  B   Continue to work with Jilda Nageotte I in therapy  C  Take meds and follow up a as recommended, follow up with other medical providers  Target Date: 6 months from treatment plan unless noted otherwise  Person/Persons Responsible for Completion of Goal: Dr Madeline Cano and Self   Progress Towards Goals: starting treatment   Treatment Modality: Medication management and therapy PRN  Review due 180 days from date of this plan: Approximately 6 months from today ( 2/26/2022 )    Expected length of service: ongoing treatment unless revised  My Physician/PA/NP and I have developed this plan together and I agree to work on the goals and objectives  I understand the treatment goals that were developed for my treatment    Signature:       Date and time:  Signature of parent/guardian if under age of 15 years: Date and time:  Signature of provider:      Date and time:  Signature of Supervising Physician:    Date and time: 8/26/2021      Дмитрий Carpenter III, DO

## 2021-08-26 NOTE — PSYCH
MEDICATION MANAGEMENT NOTE        [facility]      Name and Date of Birth: Sandy Corey 77 y o  1945    Date of Visit: August 26, 2021    SUBJECTIVE:  CC: Nasim Turner presents today for follow up on depression and anxiety , "not good"  Nasim Turner notes pain is 'bad as ever' with shingles 9-10 pain  She has no alternative or updates yet  Frustrated with medical aspects, that she is not getting any help with the 'hard pimples'    Diarrhea occasionally (was present before), is intermittent  Ongoing R leg pain, neuropathy, knee pain and other issues  Saw [facility] cardiologist on 23rd, he would like to do a stress test (but cannot due to current condition)  He increased isosorbide  They did not do an ECG  She will sign MALLORIE    She is taking xanax      F/U PRN- PHP was ok, but when she left PHP "I got into the same old rut"  Only so much ability and so much to do at home, sot that is depressing for her and her    - Corinne concerns from marriage when they were younger  She brought this up to  (he felt bad about, but not a lot of movement to help her 'in the now' related to their relationship and her mental health  Since our last visit, overall symptoms have been unchanged  Med Compliance: yes    HPI ROS:             ('was' notes: recent => remote)  Medication Side Effects:  no, maybe slight grogginess in AM with trazodone, but she notes she wants to sleep anyway due to pain     Depression (10 worst):  9 (Was 9)   Anxiety (10 worst):  9-10 (Was 9-10)   Safety concerns (SI, HI, etc):  no (Was no)   Sleep: (NM = Nightmares)  better with trazodone, about 10hr now awakens 1x/night, then out to sofa due to pain   Sleeping due to medical pain and medical issues (Was poor, ~2h last nigth)   Energy:  low (Was low)   Appetite:  low (Was low)   Weight Change:  slight loss      PHQ-9 Follow-up           PHQ-9 Score (since 7/26/2021)     None [unfilled]    Goran Resendez denies any side effects from medications unless noted above    Review Of Systems as noted above  Otherwise A comprehensive review of systems was negative  History Review: The following portions of the patient's history were reviewed and documented: allergies, current medications, past family history, past medical history, past social history and problem list      Lab Review: No new labs or no relevant labs needing review with patient today      OBJECTIVE:     MENTAL STATUS EXAM  Appearance:  age appropriate   Behavior:  pleasant, cooperative, with good eye contact   Speech:  Normal volume, regular rate and rhythm   Mood:  depressed and anxious   Affect:  mood congruent, constricted, perhaps slightly brighter   Language: intact and appropriate for age, education, and intellect   Thought Process:  Linear and goal directed   Associations: intact associations   Thought Content:  normal and appropriate, negative thinking and cognitive distortions   Perceptual Disturbances: no auditory or visual hallcunations   Risk Potential / Abnormal Thoughts: Suicidal ideation - None  Homicidal ideation - None  Potential for aggression - No       Consciousness:  Alert & Awake   Sensorium:  Grossly oriented   Attention: attention span and concentration are age appropriate       Fund of Knowledge:  Memory: awareness of current events: yes  recent and remote memory grossly intact   Insight:  good   Judgment: good   Muscle Strength Muscle Tone: normal  normal   Gait/Station: slow   Motor Activity: no abnormal movements       Risks, Benefits And Possible Side Effects Of Medications:    AGREE: Risks, benefits, and possible side effects of medications explained to Goran Resendez and she (or legal representative) verbalizes understanding and agreement for treatment      Controlled Medication Discussion:     Patient using medication appropriately  _____________________________________________________________    Recent labs:  No visits with results within 1 Month(s) from this visit  Latest known visit with results is:   Appointment on 05/17/2021   Component Date Value    Sodium 05/17/2021 137     Potassium 05/17/2021 4 7     Chloride 05/17/2021 102     CO2 05/17/2021 24     ANION GAP 05/17/2021 11     BUN 05/17/2021 21     Creatinine 05/17/2021 1 64*    Glucose 05/17/2021 188*    Calcium 05/17/2021 9 5     eGFR 05/17/2021 31          Psychiatric History  Previous diagnoses include depression, anxiety  Prior outpatient psychiatric treatment: no  Prior therapy: yes  Prior inpatient psychiatric treatment: no  PHP 2021 (some benefit, but waned)  Prior suicide attempts: no  Prior self harm: no  Prior violence or aggression: no     Social History:  The patient grew up in [place]  Childhood was described as "happy"      During childhood, parents were  Together  Only child     Abuse/neglect: none     As far as the patient (or present family member) is aware, overall childhood development: Patient does ascribe to normal developmental milestones such as walking, talking, potty training and making childhood friends      Education level: some college   Current occupation: h/o    Retired     [employer]   experience: no  Legal history: no  Access to Guns:  has a 22 rifle       Substance use and treatment:  Tobacco use: former  Caffeine Use: limited  ETOH use: no  Other substance use: no     Endorses previous experimentation with: no     Longest clean time: not applicable  History of Inpatient/Outpatient rehabilitation program: no        Traumatic History:      Abuse: none  Other Traumatic Events: none      Family Psychiatric History:      Psychiatric Illness:      Anxiety & Depression - mother, daughter  Substance Abuse:       no family history of substance abuse  Suicide Attempts:        no family history of suicide attempts     Denies bipolar disorder       Family History   Problem Relation Age of Onset    Arthritis Mother     Leukemia Mother     Other Mother         Anxiety, major depressive disorder, recurrent episode with atypical features    Coronary artery disease Father         Heart problem    Diabetes Father     Other Father         Infectious disease    Alzheimer's disease Maternal Grandmother     Other Maternal Grandfather         Heart problem    Other Daughter         Anxiety, major depressive disorder, recurrent episode with atypical features    Alcohol abuse Other         Grandparent    Cancer Family     Diabetes Family     Hypertension Family     Other Family         Reported prior back trouble, thyroid disorder         Medical / Surgical History:    Past Medical History:   Diagnosis Date    Acute embolism and thrombosis of unspecified deep veins of unspecified lower extremity (HCC)     Last Assessed:  5/18/17    Anemia     Anosmia     Anxiety     Arthritis     Asthma     Back pain     Bilateral macular retinal edema     CAD (coronary artery disease)     Cataract     Cervical disc herniation     Cervical radiculopathy     Cervical spinal stenosis     Cervical spondylolysis     Chronic mastoiditis     Complex endometrial hyperplasia     Depression     Diabetes mellitus ([address] )     DVT (deep venous thrombosis) ([address] )     Fibromyalgia     Hyperlipidemia     Hypertension     Hypothyroid     Lumbar radiculopathy     Neck pain     Obese     Spinal stenosis     Stomach ulcer     Thyroid disease      Past Surgical History:   Procedure Laterality Date    BACK SURGERY      CARPAL TUNNEL RELEASE      CATARACT EXTRACTION      CHOLECYSTECTOMY      COLONOSCOPY      CORONARY ANGIOPLASTY      CORONARY ARTERY BYPASS GRAFT      CYSTOSCOPY N/A 6/20/2017    Procedure: CYSTOSCOPY;  Surgeon: Asa Mendosa MD;  [facility];  Service: Gynecology Oncology  GA LAPAROSCOPY W TOT HYSTERECTUTERUS <=250 GRAM  W TUBE/OVARY N/A 6/20/2017    Procedure: ROBOTIC HYSTERECTOMY; BILATERAL SALPINO-OOPHERECTOMY; umbilical hernia repair ;  Surgeon: Lenin De Los Santos MD;  [facility];  Service: Gynecology Oncology    TONSILECTOMY AND ADNOIDECTOMY      UMBILICAL HERNIA REPAIR         Assessment/Plan:        Diagnoses and all orders for this visit:    MDD (major depressive disorder), recurrent severe, without psychosis ([address] )    MADAN (generalized anxiety disorder)    Panic attacks    Anxiety    Fibromyalgia    Other orders  -     isosorbide mononitrate (IMDUR) 60 mg 24 hr tablet; TAKE 1 5 TABs AT NIGHT        ______________________________________________________________________  MDD  MADAN  Panic Attacks (R/O Disorder)     MDD- not at goal  MADAN- not at goal  Panic Attacks - xanax manages adequately    Jerson Record did well in PHP before but benefits waned  Fetzima for 1wk now, no issues  Not better, but not surprising  Trazodone helping  Will give more time, she will call before refill do to see if increase in order  Has PCP f/u for ongoing shingles pain  Fetzima (likely 50% max dosing, considering renal function)  While not for pain per se, is sister to Xi, and other SNRIs have pain benefit  MDD and MADAN focus of my efforts, but hope to manage something that helps her globally  She was scared of lamictal, never took  Could consider SGA or other direction  Gabapentin she is avoidant of due to history     We had discussed [facility], she was not interested (revisit PRN)    CrCl ~31    From a biological perspective, her Kidney function is declining  She has SARAY (cannot tolerate CPAP or treatment), and other medical issues       Suicide / Homicide / Safety risk assessment: see above  safety risk low overall upon consideration of protective and risk factors  Additional Assessment:    Previous psychotropic medication trials:   cymbalta since ~2018   Tried 90mg but GI upset too much  buspar - 3 days, but stopped xanax at same time  Crying  Likely not from medication - 8/31/20 to 12/2020 took 10mg BID, then stopped on own again due to stating it was causing crying spells  Zoloft- 100mg for a few years, no side effects, not sure if helped  Valium - helped  lexapro (ordered, but did not really try because fibromyalgia worse when stopping cymbalta)   Gabapentin years ago, briefly - felt groggy on it, fell asleep behind the wheel; 'feels loopy' in past (in 8/2021 did not have issue, but very low dose, chose to stop)    Scales:         Treatment Plan:        Patient has been educated about their diagnosis and treatment modalities  They voiced understanding and agreement with the following plan:    1) MEDS:         Discussed medications and if treatment adjustment was needed/desired  - Xanax 0 5mg BID PRN (Dr Carlos Elliott prescribed last) - I TOOK OVER 8/13/2021   - Fetzima 20mg daily (8/19/2021)  - Trazodone 50mg HS      2) Labs: PRN    - ECG 4/6/21: QTcH 441, sinus bradycardia with minor NST (Scanned ~8/26/21)    3) Therapy:    - Luis Eduardo Zuhair   - Referred to therapy - she will also look on own (I printed some names off Psychology today)   - [facility] [place] (only wants face to face)    4) Medical:    - Pt will f/u with other providers as needed     5) Follow up:   - Follow up in 2mo    - Patient will call if issues or concerns      6) Treatment Plan:    - Enacted 4/23/2020, 8/31/2020, 1/13/2020, 8/26/2021  Treatment Plan not completed within required time limits due to:  presenting  with emotional/behavorial issues that required clinical intervention  @  was  very anxious and tearful during the appointment  Discussed  developing a treatment plan at their  next scheduled appointment and Lyndsey Graves  is agreeable  Discussed self monitoring of symptoms, and symptom monitoring tools      Patient has been informed of 24 hours and weekend coverage for urgent situations accessed by calling the main clinic phone number               Psychotherapy in session:  Time spent performing psychotherapy:

## 2021-08-30 ENCOUNTER — RA CDI HCC (OUTPATIENT)
Dept: OTHER | Facility: HOSPITAL | Age: 76
End: 2021-08-30

## 2021-08-30 NOTE — PROGRESS NOTES
Based on clinical documentation indicated in your record, it appears that the patient may have the following conditions:     I73 9 Claudication      M46 96 Lumbar facet arthropathy          If this is correct, please document and assess at your next visit     [facility]  coding opportunities          Number of diagnosis code(s) already on the problem list added to FYI fla                     Patients insurance company: [employer] (Medicare Advantage and Commercial)             [facility]  coding opportunities          Number of diagnosis code(s) already on the problem list added to [employer] fla                  Number of suggestions NOT actually used: 2     Patients insurance company: [employer] (Medicare Advantage and Commercial)     Visit status: Patient arrived for their scheduled appointment

## 2021-09-01 ENCOUNTER — TELEPHONE (OUTPATIENT)
Dept: INTERNAL MEDICINE CLINIC | Facility: CLINIC | Age: 76
End: 2021-09-01

## 2021-09-01 ENCOUNTER — OFFICE VISIT (OUTPATIENT)
Dept: INTERNAL MEDICINE CLINIC | Facility: CLINIC | Age: 76
End: 2021-09-01
Payer: COMMERCIAL

## 2021-09-01 VITALS
HEIGHT: 61 IN | HEART RATE: 97 BPM | BODY MASS INDEX: 37 KG/M2 | TEMPERATURE: 97.5 F | DIASTOLIC BLOOD PRESSURE: 68 MMHG | WEIGHT: 196 LBS | SYSTOLIC BLOOD PRESSURE: 158 MMHG | OXYGEN SATURATION: 100 %

## 2021-09-01 DIAGNOSIS — L98.9 SKIN LESION: Primary | ICD-10-CM

## 2021-09-01 DIAGNOSIS — B02.29 POST HERPETIC NEURALGIA: ICD-10-CM

## 2021-09-01 PROCEDURE — 3008F BODY MASS INDEX DOCD: CPT | Performed by: INTERNAL MEDICINE

## 2021-09-01 PROCEDURE — 99214 OFFICE O/P EST MOD 30 MIN: CPT | Performed by: INTERNAL MEDICINE

## 2021-09-01 PROCEDURE — 1160F RVW MEDS BY RX/DR IN RCRD: CPT | Performed by: INTERNAL MEDICINE

## 2021-09-01 PROCEDURE — 1036F TOBACCO NON-USER: CPT | Performed by: INTERNAL MEDICINE

## 2021-09-01 RX ORDER — SULFAMETHOXAZOLE AND TRIMETHOPRIM 800; 160 MG/1; MG/1
1 TABLET ORAL EVERY 12 HOURS SCHEDULED
Qty: 14 TABLET | Refills: 0 | Status: SHIPPED | OUTPATIENT
Start: 2021-09-01 | End: 2021-09-07

## 2021-09-01 NOTE — PROGRESS NOTES
Assessment/Plan:    Skin lesion  Can try warm compress to this, and follow up with surgery if worsening or turning into a boil  No vesicles in area suggestive of shingles  Will treat with Bactrim  Post herpetic neuralgia  Follow up PCP for this  Patient felt groggy on gabapentin and reports Lyrica did not help, she is using lidocaine spray       Diagnoses and all orders for this visit:    Skin lesion  -     sulfamethoxazole-trimethoprim (BACTRIM DS) 800-160 mg per tablet; Take 1 tablet by mouth every 12 (twelve) hours for 7 days    Post herpetic neuralgia          Subjective:      Patient ID: Dominik Chapa is a 77 y o  female  I have seen this patient in coverage for her PCP  I reviewed her chart which shows an episode of shingles July 1st and I reviewed subsequent visits from July 15th, July 20th, July 22nd, July 27th, August 10th, and August 18th  Some summary, she has had problems with post herpetic neuralgia, was treated with 2 courses of Valtrex, she was tried on gabapentin which made her feel groggy, and also Lyrica and she reports Lyrica did not help her symptoms much  She has also been using lidocaine spray which he reports helps her sleep  Patient coming in today for her concerns about a spot on her upper back/lower neck  She has a little "pimple" there  The following portions of the patient's history were reviewed and updated as appropriate: allergies, current medications, past family history, past medical history, past social history, past surgical history and problem list     Review of Systems   Constitutional: Positive for fatigue  Negative for chills and fever  HENT: Negative for congestion, nosebleeds, postnasal drip, sore throat and trouble swallowing  Eyes: Negative for pain  Respiratory: Negative for cough, chest tightness, shortness of breath and wheezing  Cardiovascular: Negative for chest pain, palpitations and leg swelling     Gastrointestinal: Positive for diarrhea and nausea  Negative for abdominal pain, constipation and vomiting  Endocrine: Negative for polydipsia and polyuria  Genitourinary: Negative for dysuria, flank pain and hematuria  Musculoskeletal: Negative for arthralgias  Skin: Positive for rash  Neurological: Positive for weakness and headaches  Negative for dizziness, tremors and light-headedness  Hematological: Does not bruise/bleed easily  Psychiatric/Behavioral: Negative for confusion and dysphoric mood  The patient is not nervous/anxious  Objective:      /68   Pulse 97   Temp 97 5 °F (36 4 °C) (Temporal)   Ht 5' 1" (1 549 m)   Wt 88 9 kg (196 lb)   SpO2 100%   BMI 37 03 kg/m²          Physical Exam  Constitutional:       Appearance: Normal appearance  Skin:     Comments: Has small papule which feels like possible beginning of sebaceous cyst, no vesicles  Neurological:      Mental Status: She is alert

## 2021-09-01 NOTE — ASSESSMENT & PLAN NOTE
Can try warm compress to this, and follow up with surgery if worsening or turning into a boil  No vesicles in area suggestive of shingles  Will treat with Bactrim

## 2021-09-01 NOTE — PATIENT INSTRUCTIONS
Problem List Items Addressed This Visit        Nervous and Auditory    Post herpetic neuralgia     Follow up PCP for this  Patient felt groggy on gabapentin and reports Lyrica did not help, she is using lidocaine spray            Musculoskeletal and Integument    Skin lesion - Primary     Can try warm compress to this, and follow up with surgery if worsening or turning into a boil  No vesicles in area suggestive of shingles  Will treat with Bactrim           Relevant Medications    sulfamethoxazole-trimethoprim (BACTRIM DS) 800-160 mg per tablet

## 2021-09-01 NOTE — ASSESSMENT & PLAN NOTE
Follow up PCP for this     Patient felt groggy on gabapentin and reports Lyrica did not help, she is using lidocaine spray

## 2021-09-03 ENCOUNTER — TELEPHONE (OUTPATIENT)
Dept: INTERNAL MEDICINE CLINIC | Facility: CLINIC | Age: 76
End: 2021-09-03

## 2021-09-03 NOTE — TELEPHONE ENCOUNTER
Pt saw Dr Leslie Colbert 9/1 and was prescribed sulfamethoxazole-trimethoprim (BACTRIM DS) 800-160 mg per tablet; Take 1 tablet by mouth every 12 (twelve) hours for 7 days  She is unfamiliar with this medication and is not sure if it is safe to take  Would like to know if she should take this now or wait until she sees you on 9/7? Please advise

## 2021-09-04 DIAGNOSIS — F41.1 GAD (GENERALIZED ANXIETY DISORDER): ICD-10-CM

## 2021-09-04 DIAGNOSIS — F41.0 PANIC ATTACKS: ICD-10-CM

## 2021-09-04 DIAGNOSIS — F33.2 MDD (MAJOR DEPRESSIVE DISORDER), RECURRENT SEVERE, WITHOUT PSYCHOSIS (HCC): ICD-10-CM

## 2021-09-07 ENCOUNTER — OFFICE VISIT (OUTPATIENT)
Dept: INTERNAL MEDICINE CLINIC | Facility: CLINIC | Age: 76
End: 2021-09-07
Payer: COMMERCIAL

## 2021-09-07 VITALS
HEART RATE: 84 BPM | RESPIRATION RATE: 16 BRPM | SYSTOLIC BLOOD PRESSURE: 142 MMHG | OXYGEN SATURATION: 98 % | DIASTOLIC BLOOD PRESSURE: 70 MMHG | BODY MASS INDEX: 36.74 KG/M2 | HEIGHT: 61 IN | WEIGHT: 194.6 LBS

## 2021-09-07 DIAGNOSIS — M79.7 FIBROMYALGIA: ICD-10-CM

## 2021-09-07 DIAGNOSIS — Z23 ENCOUNTER FOR IMMUNIZATION: ICD-10-CM

## 2021-09-07 DIAGNOSIS — F33.2 MDD (MAJOR DEPRESSIVE DISORDER), RECURRENT SEVERE, WITHOUT PSYCHOSIS (HCC): ICD-10-CM

## 2021-09-07 DIAGNOSIS — Z11.59 NEED FOR HEPATITIS C SCREENING TEST: ICD-10-CM

## 2021-09-07 DIAGNOSIS — F41.9 ANXIETY: ICD-10-CM

## 2021-09-07 DIAGNOSIS — F41.0 PANIC ATTACKS: ICD-10-CM

## 2021-09-07 DIAGNOSIS — F41.1 GAD (GENERALIZED ANXIETY DISORDER): ICD-10-CM

## 2021-09-07 DIAGNOSIS — B02.9 HERPES ZOSTER WITHOUT COMPLICATION: Primary | ICD-10-CM

## 2021-09-07 PROCEDURE — 99213 OFFICE O/P EST LOW 20 MIN: CPT | Performed by: INTERNAL MEDICINE

## 2021-09-07 RX ORDER — TRAZODONE HYDROCHLORIDE 50 MG/1
25-50 TABLET ORAL
Qty: 30 TABLET | Refills: 1 | Status: SHIPPED | OUTPATIENT
Start: 2021-09-07 | End: 2021-10-05

## 2021-09-07 NOTE — PROGRESS NOTES
Assessment/Plan:    Herpes zoster without complication   Post herpetic neuralgia no active vesicles pain reduced to 8 over depression is worse I have contacted Psychiatry to see if we can increase the Fetzima     no SI she reports me she is now sleeping     no active vessicles skin is healing mild scarring    MDD (major depressive disorder), recurrent severe, without psychosis ([facility] [address] )   Ongoing symptoms worsening depression no suicidal ideation I will send a message to her psychiatrist to  See if we can increase the Fetzima  Pt to f/u with psy  Problem List Items Addressed This Visit        Other    Need for hepatitis C screening test    Encounter for immunization    Herpes zoster without complication - Primary      Post herpetic neuralgia no active vesicles pain reduced to 8 over depression is worse I have contacted Psychiatry to see if we can increase the Fetzima     no SI she reports me she is now sleeping     no active vessicles skin is healing mild scarring                RTO 2 weeks call if any  Subjective:      Patient ID: Roscoe Galvan is a 77 y o  female  HPI  77year old female for follow-up examination regarding post herpetic neuralgia, anx/dep; The patient reports me compliant taking medications without untoward side effects the  The patient is here to review his medical condition, update me on the medical condition and the patient reports me no hospitalizations and no ER visits  pain is slightly better , as long as she dosent lay on it doing ok , she has psoriasis  In ears and hair (she is using psoriasis shampoo which aggrevated the [facility] and then went back to usual shampoo; she is on fetzima  Cries at drop of hat depression bad because of the news on the tv about covid no si she cries very easily, missed grand daughter trazadone not working; pain 8/10 (prior 10) depression worse  Pt wants to hold off on mri of brain only a little unsteady on the feel , possible related to the back working with Dr Tello Falling  But misssed roseanne and rescheduled the appt   Numbness , right nape of the neck   The following portions of the patient's history were reviewed and updated as appropriate: allergies, current medications, past family history, past medical history, past social history, past surgical history and problem list     Review of Systems   Constitutional: Negative for activity change, appetite change and unexpected weight change  Eyes: Negative for visual disturbance  Respiratory: Negative for cough and shortness of breath  Cardiovascular: Negative for chest pain  Gastrointestinal: Negative for abdominal pain, diarrhea, nausea and vomiting  Musculoskeletal:        Facial pain, shoulder blade secondary to zoster no vesicles mild scarring   Neurological: Negative for dizziness, light-headedness and headaches  Hematological: Negative for adenopathy  Objective:    No follow-ups on file  No results found        Allergies   Allergen Reactions    Molds & Smuts Allergic Rhinitis    Other Allergic Rhinitis     RAGWEED, CAT DANDER, DOG DANDER     Pollen Extract Other (See Comments)     Cold symptoms         Past Medical History:   Diagnosis Date    Acute embolism and thrombosis of unspecified deep veins of unspecified lower extremity (HCC)     Last Assessed:  5/18/17    Anemia     Anosmia     Anxiety     Arthritis     Asthma     Back pain     Bilateral macular retinal edema     CAD (coronary artery disease)     Cataract     Cervical disc herniation     Cervical radiculopathy     Cervical spinal stenosis     Cervical spondylolysis     Chronic mastoiditis     Complex endometrial hyperplasia     Depression     Diabetes mellitus ([address] )     DVT (deep venous thrombosis) (HCC)     Fibromyalgia     Hyperlipidemia     Hypertension     Hypothyroid     Lumbar radiculopathy     Neck pain     Obese     Spinal stenosis     Stomach ulcer     Thyroid disease      Past Surgical History:   Procedure Laterality Date    BACK SURGERY      CARPAL TUNNEL RELEASE      CATARACT EXTRACTION      CHOLECYSTECTOMY      COLONOSCOPY      CORONARY ANGIOPLASTY      CORONARY ARTERY BYPASS GRAFT      CYSTOSCOPY N/A 6/20/2017    Procedure: [place];  Surgeon: Yvan Franco MD;  [facility];  Service: Gynecology Oncology    SD LAPAROSCOPY W TOT HYSTERECTUTERUS <=[address]  W TUBE/OVARY N/A 6/20/2017    Procedure: ROBOTIC HYSTERECTOMY; BILATERAL SALPINO-OOPHERECTOMY; umbilical hernia repair ;  Surgeon: Yvan Franco MD;  [facility];  Service: Gynecology Oncology    TONSILECTOMY AND ADNOIDECTOMY      UMBILICAL HERNIA REPAIR       Current Outpatient Medications on File Prior to Visit   Medication Sig Dispense Refill    ALPRAZolam (XANAX) 0 5 mg tablet Take 1 tablet (0 5 mg total) by mouth 2 (two) times a day as needed for anxiety 60 tablet 1    aspirin 81 MG tablet Take 81 mg by mouth daily      B-D UF III MINI PEN NEEDLES 31G X 5 MM MISC       cholecalciferol (VITAMIN D3) 1,000 units tablet Take 2,000 Units by mouth daily      diclofenac sodium (VOLTAREN) 1 % Apply 2 g topically 4 (four) times a day 1 Tube 0    diltiazem (TIAZAC) 300 MG 24 hr capsule Take 300 mg by mouth daily      enalapril (VASOTEC) 20 mg tablet Take 20 mg by mouth daily      insulin aspart (NovoLOG) 100 units/mL injection Inject 12 Units under the skin 3 (three) times a day before meals (Patient taking differently: Inject under the skin 3 (three) times a day before meals )  0    Insulin Disposable Pump (OmniPod Dash 5 Pack Pods) MISC USE 1 POD EVERY 1 5 DAYS      Insulin Disposable Pump (OMNIPOD DASH 5 PACK) MISC CHANGE PODS EVERY 2 DAYS UTD  3    isosorbide mononitrate (IMDUR) 60 mg 24 hr tablet TAKE 1 5 TABs AT NIGHT      levothyroxine 50 mcg tablet Take 50 mcg by mouth daily      rosuvastatin (CRESTOR) 20 MG tablet Take 20 mg by mouth every evening  2    [DISCONTINUED] Levomilnacipran HCl ER (Fetzima) 20 MG extended release capsule Take 1 capsule (20 mg total) by mouth daily 30 capsule 1    [DISCONTINUED] sulfamethoxazole-trimethoprim (BACTRIM DS) 800-160 mg per tablet Take 1 tablet by mouth every 12 (twelve) hours for 7 days 14 tablet 0    [DISCONTINUED] traZODone (DESYREL) 50 mg tablet Take 0 5-1 tablets (25-50 mg total) by mouth daily at bedtime as needed for sleep 30 tablet 1    albuterol (2 5 mg/3 mL) 0 083 % nebulizer solution Take 3 mL (2 5 mg total) by nebulization every 6 (six) hours as needed for wheezing (Patient not taking: Reported on 7/15/2021) 75 mL 0    albuterol (PROAIR HFA) 90 mcg/act inhaler Inhale 2 puffs every 6 (six) hours as needed  (Patient not taking: Reported on 7/15/2021)      BAQSIMI TWO PACK 3 MG/DOSE POWD Use as directed (Patient not taking: Reported on 8/5/2021)  0    betamethasone dipropionate 0 05 % lotion  (Patient not taking: Reported on 7/15/2021)      docusate sodium (COLACE) 100 mg capsule Take 1 capsule (100 mg total) by mouth 2 (two) times a day (Patient not taking: Reported on 7/15/2021) 20 capsule 0    naloxone (NARCAN) 4 mg/0 1 mL nasal spray Administer 1 spray into a nostril  If no response after 2-3 minutes, give another dose in the other nostril using a new spray  (Patient not taking: Reported on 8/5/2021) 1 each 1    nitroglycerin (NITROLINGUAL) 0 4 mg/spray spray USE ONE SPRAY Q 5 MINUTES AS NEEDED FOR CHEST PAIN  IF NO RELIEF, CALL 911  (Patient not taking: Reported on 7/15/2021)  1    traZODone (DESYREL) 50 mg tablet TAKE 0 5-1 TABLETS (25-50 MG TOTAL) BY MOUTH DAILY AT BEDTIME AS NEEDED FOR SLEEP 30 tablet 1    valACYclovir (VALTREX) 1,000 mg tablet Take 1 tablet (1,000 mg total) by mouth 2 (two) times a day for 7 days 14 tablet 0     No current facility-administered medications on file prior to visit       Family History   Problem Relation Age of Onset    Arthritis Mother     Leukemia Mother     Other Mother         Anxiety, major depressive disorder, recurrent episode with atypical features    Coronary artery disease Father         Heart problem    Diabetes Father     Other Father         Infectious disease    Alzheimer's disease Maternal Grandmother     Other Maternal Grandfather         Heart problem    Other Daughter         Anxiety, major depressive disorder, recurrent episode with atypical features    Alcohol abuse Other         Grandparent    Cancer Family     Diabetes Family     Hypertension Family     Other Family         Reported prior back trouble, thyroid disorder     Social History     Socioeconomic History    Marital status: /Civil Union     Spouse name: Not on file    Number of children: 2    Years of education: High school or GED    Highest education level: Not on file   Occupational History    Occupation: Retired   Tobacco Use    Smoking status: Former Smoker     Packs/day: 1 00     Years: 6      Pack years: 6      Types: Cigarettes     Quit date:      Years since quittin 7    Smokeless tobacco: Never Used    Tobacco comment: Smoked about a half a pack for about 4 yrs  Quite about 50 yrs ago  Vaping Use    Vaping Use: Never used   Substance and Sexual Activity    Alcohol use: No    Drug use: No    Sexual activity: Never     Comment: No known STD risk factors   Other Topics Concern    Not on file   Social History Narrative    Lives independently with spouse    No known risk factors    Denied:  Exercising regularly     Social Determinants of Health     Financial Resource Strain:     Difficulty of Paying Living Expenses:    Food Insecurity:     Worried About Running Out of Food in the Last Year:     [address] in the Last Year:    Transportation Needs:     Lack of Transportation (Medical):      Lack of Transportation (Non-Medical):    Physical Activity:     Days of Exercise per Week:     Minutes of Exercise per Session:    Stress:     Feeling of Stress :    Social Connections:     Frequency of Communication with Friends and Family:     Frequency of Social Gatherings with Friends and Family:     Attends Gnosticism Services:     Active Member of Clubs or Organizations:     Attends Club or Organization Meetings:     Marital Status:    Intimate Partner Violence:     Fear of Current or Ex-Partner:     Emotionally Abused:     Physically Abused:     Sexually Abused:      Vitals:    09/07/21 1056   BP: 142/70   Pulse: 84   Resp: 16   SpO2: 98%   Weight: 88 3 kg (194 lb 9 6 oz)   Height: 5' 1" (1 549 m)     Results for orders placed or performed in visit on 51/70/15   Basic metabolic panel   Result Value Ref Range    Sodium 137 136 - 145 mmol/L    Potassium 4 7 3 5 - 5 3 mmol/L    Chloride 102 100 - 108 mmol/L    CO2 24 21 - 32 mmol/L    ANION GAP 11 4 - 13 mmol/L    BUN 21 5 - 25 mg/dL    Creatinine 1 64 (H) 0 60 - 1 30 mg/dL    Glucose 188 (H) 65 - 140 mg/dL    Calcium 9 5 8 3 - 10 1 mg/dL    eGFR 31 ml/min/1 73sq m     Weight (last 2 days)     Date/Time   Weight    09/07/21 1056   88 3 (194 6)            Body mass index is 36 77 kg/m²  BP      Temp      Pulse     Resp      SpO2        Vitals:    09/07/21 1056   Weight: 88 3 kg (194 lb 9 6 oz)     Vitals:    09/07/21 1056   Weight: 88 3 kg (194 lb 9 6 oz)       /70   Pulse 84   Resp 16   Ht 5' 1" (1 549 m)   Wt 88 3 kg (194 lb 9 6 oz)   SpO2 98%   BMI 36 77 kg/m²          Physical Exam  Constitutional:       Appearance: She is well-developed  HENT:      Head: Normocephalic  Eyes:      General: No scleral icterus  Right eye: No discharge  Left eye: No discharge  Conjunctiva/sclera: Conjunctivae normal       Pupils: Pupils are equal, round, and reactive to light  Cardiovascular:      Rate and Rhythm: Normal rate and regular rhythm  Heart sounds: Normal heart sounds  No murmur heard  No friction rub  No gallop  Pulmonary:      Effort: No respiratory distress        Breath sounds: Normal breath sounds  No wheezing or rales  Abdominal:      General: Bowel sounds are normal  There is no distension  Palpations: Abdomen is soft  There is no mass  Tenderness: There is no abdominal tenderness  There is no guarding or rebound  Musculoskeletal:         General: No deformity  Cervical back: Neck supple  Lymphadenopathy:      Cervical: No cervical adenopathy  Neurological:      Mental Status: She is alert  Coordination: Coordination normal    Psychiatric:         Mood and Affect: Mood is anxious and depressed  Thought Content: Thought content does not include suicidal ideation

## 2021-09-08 NOTE — ASSESSMENT & PLAN NOTE
Post herpetic neuralgia no active vesicles pain reduced to 8 over depression is worse I have contacted Psychiatry to see if we can increase the Fetzima     no SI she reports me she is now sleeping     no active vessicles skin is healing mild scarring

## 2021-09-08 NOTE — ASSESSMENT & PLAN NOTE
Ongoing symptoms worsening depression no suicidal ideation I will send a message to her psychiatrist to  See if we can increase the Fetzima  Pt to f/u with psy

## 2021-09-17 ENCOUNTER — APPOINTMENT (OUTPATIENT)
Dept: LAB | Facility: CLINIC | Age: 76
End: 2021-09-17
Payer: COMMERCIAL

## 2021-09-17 DIAGNOSIS — N18.9 CHRONIC KIDNEY DISEASE, UNSPECIFIED CKD STAGE: ICD-10-CM

## 2021-09-17 LAB
ANION GAP SERPL CALCULATED.3IONS-SCNC: 12 MMOL/L (ref 4–13)
BUN SERPL-MCNC: 14 MG/DL (ref 5–25)
CALCIUM SERPL-MCNC: 9.3 MG/DL (ref 8.3–10.1)
CHLORIDE SERPL-SCNC: 102 MMOL/L (ref 100–108)
CO2 SERPL-SCNC: 26 MMOL/L (ref 21–32)
CREAT SERPL-MCNC: 1.4 MG/DL (ref 0.6–1.3)
GFR SERPL CREATININE-BSD FRML MDRD: 37 ML/MIN/1.73SQ M
GLUCOSE SERPL-MCNC: 102 MG/DL (ref 65–140)
POTASSIUM SERPL-SCNC: 3.9 MMOL/L (ref 3.5–5.3)
SODIUM SERPL-SCNC: 140 MMOL/L (ref 136–145)

## 2021-09-17 PROCEDURE — 80048 BASIC METABOLIC PNL TOTAL CA: CPT

## 2021-09-17 PROCEDURE — 3066F NEPHROPATHY DOC TX: CPT | Performed by: INTERNAL MEDICINE

## 2021-09-17 PROCEDURE — 36415 COLL VENOUS BLD VENIPUNCTURE: CPT

## 2021-09-18 ENCOUNTER — APPOINTMENT (OUTPATIENT)
Dept: LAB | Facility: CLINIC | Age: 76
End: 2021-09-18
Payer: COMMERCIAL

## 2021-09-18 DIAGNOSIS — R80.9 PROTEINURIA: ICD-10-CM

## 2021-09-18 LAB
CREAT UR-MCNC: 136 MG/DL
CREAT UR-MCNC: 137 MG/DL
MICROALBUMIN UR-MCNC: 107 MG/L (ref 0–20)
MICROALBUMIN/CREAT 24H UR: 78 MG/G CREATININE (ref 0–30)
PROT UR-MCNC: 36 MG/DL
PROT/CREAT UR: 0.26 MG/G{CREAT} (ref 0–0.1)

## 2021-09-18 PROCEDURE — 82570 ASSAY OF URINE CREATININE: CPT | Performed by: INTERNAL MEDICINE

## 2021-09-18 PROCEDURE — 84156 ASSAY OF PROTEIN URINE: CPT

## 2021-09-18 PROCEDURE — 82043 UR ALBUMIN QUANTITATIVE: CPT | Performed by: INTERNAL MEDICINE

## 2021-09-18 PROCEDURE — 82570 ASSAY OF URINE CREATININE: CPT

## 2021-09-18 PROCEDURE — 3060F POS MICROALBUMINURIA REV: CPT | Performed by: INTERNAL MEDICINE

## 2021-09-20 ENCOUNTER — OFFICE VISIT (OUTPATIENT)
Dept: NEPHROLOGY | Facility: CLINIC | Age: 76
End: 2021-09-20
Payer: COMMERCIAL

## 2021-09-20 VITALS
BODY MASS INDEX: 36.63 KG/M2 | HEART RATE: 80 BPM | WEIGHT: 194 LBS | SYSTOLIC BLOOD PRESSURE: 140 MMHG | HEIGHT: 61 IN | DIASTOLIC BLOOD PRESSURE: 72 MMHG

## 2021-09-20 DIAGNOSIS — R80.9 MICROALBUMINURIA: ICD-10-CM

## 2021-09-20 DIAGNOSIS — N18.9 CHRONIC KIDNEY DISEASE, UNSPECIFIED CKD STAGE: Primary | ICD-10-CM

## 2021-09-20 PROCEDURE — 99214 OFFICE O/P EST MOD 30 MIN: CPT | Performed by: INTERNAL MEDICINE

## 2021-09-20 NOTE — PATIENT INSTRUCTIONS
You are here for follow-up I am sorry to hear that you are suffering from the shingles for the last few months but hopefully that will resolve itself in the near future  Your creatinine which is the blood test for the kidney is actually better than it was last visit at 1 4 and looking back a couple years it is stable at that level so there has been no significant worsening of your kidney function  The urine protein screen was low so that tells me you probably do not have diabetic kidney disease because those patients tend to have a lot of protein in the urine  The way were treating it presently sugar control with A1c of 7% blood pressure control enalapril and lipid treatment  I will contact her doctor regarding the use of Kriste Kussmaul for you as you do anticipate your sugars may be a little more difficult to control when you start eating better  Labs and follow-up as scheduled please call if you have any questions or concerns before next visit

## 2021-09-20 NOTE — PROGRESS NOTES
NEPHROLOGY PROGRESS NOTE    Lexy Calvo 77 y o  female MRN: [id number]  Unit/Bed#:  Encounter: [id number]  Reason for Consult:  Chronic renal insufficiency and micro albuminuria    The patient is here for follow-up  Since I have seen her she did have a bout of shingles that affected her neck and states that it was very painful to the point where she did have much of an appetite and was very difficult for her to sleep  At this point she still has some post herpetic neuralgia and the lesions are starting to fade  ASSESSMENT/PLAN:  1  Renal    The patient is a longstanding diabetic and I monitor for chronic renal insufficiency  Her latest creatinine is 1 4 looking back 3 years ago it is stable without any progression so she is doing extremely well  The patient still has urine albumin estimation in the micro albuminuria range so at this point I would say that she does not have diabetic nephropathy and likely just has nephrosclerosis  Her latest A1c was around 7% her blood pressure is excellent she is on medications for cholesterol as well  She is also on enalapril  At this point things are stable I explained this to her in detail will continue with current medical management  She did tell me that her endocrinologist began to talk about using [place] and I told her that I would send correspondence to them  With respect to using this medication at this point I would only initiated if you feel it is going to help her glycemic control  There may be an added benefit to delaying progression of chronic kidney disease but again she is doing very well with that  Her latest A1c was around 7%  She states she really had been eating great so when she feels better her glycemic control may become more difficult  The patient is okay to initiate this medication if endocrinology feels it would be useful  SUBJECTIVE:  Review of Systems   Constitutional: Positive for decreased appetite   Negative for chills, diaphoresis and fever  HENT: Negative  Eyes: Negative  Cardiovascular: Negative  Negative for chest pain, dyspnea on exertion, leg swelling and orthopnea  Respiratory: Negative  Negative for cough, shortness of breath, sputum production and wheezing  Skin: Positive for rash  Recently had shingles on her neck and this is clearing but still has some pain there  Gastrointestinal: Negative  Negative for bloating, abdominal pain, diarrhea, nausea and vomiting  Genitourinary: Negative for dysuria, flank pain, hematuria and incomplete emptying  Neurological: Negative for dizziness, focal weakness, headaches and weakness  Psychiatric/Behavioral: Negative for altered mental status, depression, hallucinations and hypervigilance  OBJECTIVE:  Current Weight: Weight - Scale: 88 kg (194 lb)  [email or web address] com:     Blood pressure 140/72, pulse 80, height 5' 1" (1 549 m), weight 88 kg (194 lb)  , Body mass index is 36 66 kg/m²  [unfilled]    Physical Exam: /72 (BP Location: Right arm, Patient Position: Sitting, Cuff Size: Standard)   Pulse 80   Ht 5' 1" (1 549 m)   Wt 88 kg (194 lb)   BMI 36 66 kg/m²   Physical Exam  Constitutional:       General: She is not in acute distress  Appearance: She is not ill-appearing or diaphoretic  HENT:      Head: Normocephalic and atraumatic  Nose:      Comments: Wearing mask     Mouth/Throat:      Comments: Wearing mask  Eyes:      General: No scleral icterus  Extraocular Movements: Extraocular movements intact  Cardiovascular:      Rate and Rhythm: Normal rate and regular rhythm  Heart sounds: No friction rub  No gallop  Comments: No significant edema  Pulmonary:      Effort: Pulmonary effort is normal  No respiratory distress  Breath sounds: Normal breath sounds  No wheezing, rhonchi or rales  Abdominal:      General: Bowel sounds are normal  There is no distension  Palpations: Abdomen is soft  Tenderness: There is no abdominal tenderness  There is no rebound  Musculoskeletal:      Cervical back: Normal range of motion and neck supple  Neurological:      General: No focal deficit present  Mental Status: She is alert and oriented to person, place, and time  Mental status is at baseline  Psychiatric:         Mood and Affect: Mood normal          Behavior: Behavior normal          Thought Content:  Thought content normal          Judgment: Judgment normal          Medications:    Current Outpatient Medications:     ALPRAZolam (XANAX) 0 5 mg tablet, Take 1 tablet (0 5 mg total) by mouth 2 (two) times a day as needed for anxiety, Disp: 60 tablet, Rfl: 1    aspirin 81 MG tablet, Take 81 mg by mouth daily, Disp: , Rfl:     B-D UF III MINI PEN NEEDLES 31G X 5 MM MISC, , Disp: , Rfl:     cholecalciferol (VITAMIN D3) 1,000 units tablet, Take 2,000 Units by mouth daily, Disp: , Rfl:     diclofenac sodium (VOLTAREN) 1 %, Apply 2 g topically 4 (four) times a day, Disp: 1 Tube, Rfl: 0    diltiazem (TIAZAC) 300 MG 24 hr capsule, Take 300 mg by mouth daily, Disp: , Rfl:     enalapril (VASOTEC) 20 mg tablet, Take 20 mg by mouth daily, Disp: , Rfl:     insulin aspart (NovoLOG) 100 units/mL injection, Inject 12 Units under the skin 3 (three) times a day before meals (Patient taking differently: Inject under the skin 3 (three) times a day before meals ), Disp: , Rfl: 0    Insulin Disposable Pump (OmniPod Dash 5 Pack Pods) MISC, USE 1 POD EVERY 1 5 DAYS, Disp: , Rfl:     Insulin Disposable Pump (OMNIPOD DASH 5 PACK) MISC, CHANGE PODS EVERY 2 DAYS UTD, Disp: , Rfl: 3    isosorbide mononitrate (IMDUR) 60 mg 24 hr tablet, TAKE 1 5 TABs AT NIGHT, Disp: , Rfl:     Levomilnacipran HCl ER (Fetzima) 40 MG extended release capsule, Take 1 capsule (40 mg total) by mouth daily, Disp: 30 capsule, Rfl: 1    levothyroxine 50 mcg tablet, Take 50 mcg by mouth daily, Disp: , Rfl:     rosuvastatin (CRESTOR) 20 MG tablet, Take 20 mg by mouth every evening, Disp: , Rfl: 2    traZODone (DESYREL) 50 mg tablet, TAKE 0 5-1 TABLETS (25-50 MG TOTAL) BY MOUTH DAILY AT BEDTIME AS NEEDED FOR SLEEP, Disp: 30 tablet, Rfl: 1    albuterol (2 5 mg/3 mL) 0 083 % nebulizer solution, Take 3 mL (2 5 mg total) by nebulization every 6 (six) hours as needed for wheezing (Patient not taking: Reported on 7/15/2021), Disp: 75 mL, Rfl: 0    albuterol (PROAIR HFA) 90 mcg/act inhaler, Inhale 2 puffs every 6 (six) hours as needed  (Patient not taking: Reported on 7/15/2021), Disp: , Rfl:     BAQSIMI TWO PACK 3 MG/DOSE POWD, Use as directed (Patient not taking: Reported on 8/5/2021), Disp: , Rfl: 0    betamethasone dipropionate 0 05 % lotion, , Disp: , Rfl:     docusate sodium (COLACE) 100 mg capsule, Take 1 capsule (100 mg total) by mouth 2 (two) times a day (Patient not taking: Reported on 7/15/2021), Disp: 20 capsule, Rfl: 0    naloxone (NARCAN) 4 mg/0 1 mL nasal spray, Administer 1 spray into a nostril  If no response after 2-3 minutes, give another dose in the other nostril using a new spray  (Patient not taking: Reported on 8/5/2021), Disp: 1 each, Rfl: 1    nitroglycerin (NITROLINGUAL) 0 4 mg/spray spray, USE ONE SPRAY Q 5 MINUTES AS NEEDED FOR CHEST PAIN  IF NO RELIEF, CALL 911   (Patient not taking: Reported on 7/15/2021), Disp: , Rfl: 1    valACYclovir (VALTREX) 1,000 mg tablet, Take 1 tablet (1,000 mg total) by mouth 2 (two) times a day for 7 days, Disp: 14 tablet, Rfl: 0    Laboratory Results:  Lab Results   Component Value Date    WBC 9 54 11/11/2020    HGB 12 1 11/11/2020    HCT 39 4 11/11/2020    MCV 89 11/11/2020     11/11/2020     Lab Results   Component Value Date    SODIUM 140 09/17/2021    K 3 9 09/17/2021     09/17/2021    CO2 26 09/17/2021    BUN 14 09/17/2021    CREATININE 1 40 (H) 09/17/2021    GLUC 102 09/17/2021    CALCIUM 9 3 09/17/2021     Lab Results   Component Value Date    CALCIUM 9 3 09/17/2021    PHOS 3 4 11/11/2020     No results found for: LABPROT

## 2021-09-20 NOTE — LETTER
September 20, 2021     Danny Charles  [address] [facility] [address] [place] [address]    Patient: Dewey Talbot   YOB: 1945   Date of Visit: 9/20/2021       Dear Dr Juan Miguel Mirza: Thank you for referring Dewey Talbot to me for evaluation  Below are my notes for this consultation  If you have questions, please do not hesitate to call me  I look forward to following your patient along with you  Sincerely,        Harjinder Madison MD        CC: MD Harjinder Raza MD  9/20/2021 12:35 PM  Sign when Signing Visit  NEPHROLOGY PROGRESS NOTE    Dewey Talbot 77 y o  female MRN: [id number]  Unit/Bed#:  Encounter: [id number]  Reason for Consult:  Chronic renal insufficiency and micro albuminuria    The patient is here for follow-up  Since I have seen her she did have a bout of shingles that affected her neck and states that it was very painful to the point where she did have much of an appetite and was very difficult for her to sleep  At this point she still has some post herpetic neuralgia and the lesions are starting to fade  ASSESSMENT/PLAN:  1  Renal    The patient is a longstanding diabetic and I monitor for chronic renal insufficiency  Her latest creatinine is 1 4 looking back 3 years ago it is stable without any progression so she is doing extremely well  The patient still has urine albumin estimation in the micro albuminuria range so at this point I would say that she does not have diabetic nephropathy and likely just has nephrosclerosis  Her latest A1c was around 7% her blood pressure is excellent she is on medications for cholesterol as well  She is also on enalapril  At this point things are stable I explained this to her in detail will continue with current medical management  She did tell me that her endocrinologist began to talk about using Thomasene Nesquehoning and I told her that I would send correspondence to them    With respect to using this medication at this point I would only initiated if you feel it is going to help her glycemic control  There may be an added benefit to delaying progression of chronic kidney disease but again she is doing very well with that  Her latest A1c was around 7%  She states she really had been eating great so when she feels better her glycemic control may become more difficult  The patient is okay to initiate this medication if endocrinology feels it would be useful  SUBJECTIVE:  Review of Systems   Constitutional: Positive for decreased appetite  Negative for chills, diaphoresis and fever  HENT: Negative  Eyes: Negative  Cardiovascular: Negative  Negative for chest pain, dyspnea on exertion, leg swelling and orthopnea  Respiratory: Negative  Negative for cough, shortness of breath, sputum production and wheezing  Skin: Positive for rash  Recently had shingles on her neck and this is clearing but still has some pain there  Gastrointestinal: Negative  Negative for bloating, abdominal pain, diarrhea, nausea and vomiting  Genitourinary: Negative for dysuria, flank pain, hematuria and incomplete emptying  Neurological: Negative for dizziness, focal weakness, headaches and weakness  Psychiatric/Behavioral: Negative for altered mental status, depression, hallucinations and hypervigilance  OBJECTIVE:  Current Weight: Weight - Scale: 88 kg (194 lb)  [email or web address]:     Blood pressure 140/72, pulse 80, height 5' 1" (1 549 m), weight 88 kg (194 lb)  , Body mass index is 36 66 kg/m²  @[facility]@    Physical Exam: /72 (BP Location: Right arm, Patient Position: Sitting, Cuff Size: Standard)   Pulse 80   Ht 5' 1" (1 549 m)   Wt 88 kg (194 lb)   BMI 36 66 kg/m²   Physical Exam  Constitutional:       General: She is not in acute distress  Appearance: She is not ill-appearing or diaphoretic  HENT:      Head: Normocephalic and atraumatic        Nose:      Comments: Wearing mask Mouth/Throat:      Comments: Wearing mask  Eyes:      General: No scleral icterus  Extraocular Movements: Extraocular movements intact  Cardiovascular:      Rate and Rhythm: Normal rate and regular rhythm  Heart sounds: No friction rub  No gallop  Comments: No significant edema  Pulmonary:      Effort: Pulmonary effort is normal  No respiratory distress  Breath sounds: Normal breath sounds  No wheezing, rhonchi or rales  Abdominal:      General: Bowel sounds are normal  There is no distension  Palpations: Abdomen is soft  Tenderness: There is no abdominal tenderness  There is no rebound  Musculoskeletal:      Cervical back: Normal range of motion and neck supple  Neurological:      General: No focal deficit present  Mental Status: She is alert and oriented to person, place, and time  Mental status is at baseline  Psychiatric:         Mood and Affect: Mood normal          Behavior: Behavior normal          Thought Content:  Thought content normal          Judgment: Judgment normal          Medications:    Current Outpatient Medications:     ALPRAZolam (XANAX) 0 5 mg tablet, Take 1 tablet (0 5 mg total) by mouth 2 (two) times a day as needed for anxiety, Disp: 60 tablet, Rfl: 1    aspirin 81 MG tablet, Take 81 mg by mouth daily, Disp: , Rfl:     B-D UF III MINI PEN NEEDLES 31G X 5 MM MISC, , Disp: , Rfl:     cholecalciferol (VITAMIN D3) 1,000 units tablet, Take 2,000 Units by mouth daily, Disp: , Rfl:     diclofenac sodium (VOLTAREN) 1 %, Apply 2 g topically 4 (four) times a day, Disp: 1 Tube, Rfl: 0    diltiazem (TIAZAC) 300 MG 24 hr capsule, Take 300 mg by mouth daily, Disp: , Rfl:     enalapril (VASOTEC) 20 mg tablet, Take 20 mg by mouth daily, Disp: , Rfl:     insulin aspart (NovoLOG) 100 units/mL injection, Inject 12 Units under the skin 3 (three) times a day before meals (Patient taking differently: Inject under the skin 3 (three) times a day before meals ), Disp: , Rfl: 0    Insulin Disposable Pump (OmniPod Dash 5 Pack Pods) MISC, USE 1 POD EVERY 1 5 DAYS, Disp: , Rfl:     Insulin Disposable Pump (OMNIPOD DASH 5 PACK) MISC, CHANGE PODS EVERY 2 DAYS UTD, Disp: , Rfl: 3    isosorbide mononitrate (IMDUR) 60 mg 24 hr tablet, TAKE 1 5 TABs AT NIGHT, Disp: , Rfl:     Levomilnacipran HCl ER (Fetzima) 40 MG extended release capsule, Take 1 capsule (40 mg total) by mouth daily, Disp: 30 capsule, Rfl: 1    levothyroxine 50 mcg tablet, Take 50 mcg by mouth daily, Disp: , Rfl:     rosuvastatin (CRESTOR) 20 MG tablet, Take 20 mg by mouth every evening, Disp: , Rfl: 2    traZODone (DESYREL) 50 mg tablet, TAKE 0 5-1 TABLETS (25-50 MG TOTAL) BY MOUTH DAILY AT BEDTIME AS NEEDED FOR SLEEP, Disp: 30 tablet, Rfl: 1    albuterol (2 5 mg/3 mL) 0 083 % nebulizer solution, Take 3 mL (2 5 mg total) by nebulization every 6 (six) hours as needed for wheezing (Patient not taking: Reported on 7/15/2021), Disp: 75 mL, Rfl: 0    albuterol (PROAIR HFA) 90 mcg/act inhaler, Inhale 2 puffs every 6 (six) hours as needed  (Patient not taking: Reported on 7/15/2021), Disp: , Rfl:     BAQSIMI TWO PACK 3 MG/DOSE POWD, Use as directed (Patient not taking: Reported on 8/5/2021), Disp: , Rfl: 0    betamethasone dipropionate 0 05 % lotion, , Disp: , Rfl:     docusate sodium (COLACE) 100 mg capsule, Take 1 capsule (100 mg total) by mouth 2 (two) times a day (Patient not taking: Reported on 7/15/2021), Disp: 20 capsule, Rfl: 0    naloxone (NARCAN) 4 mg/0 1 mL nasal spray, Administer 1 spray into a nostril  If no response after 2-3 minutes, give another dose in the other nostril using a new spray  (Patient not taking: Reported on 8/5/2021), Disp: 1 each, Rfl: 1    nitroglycerin (NITROLINGUAL) 0 4 mg/spray spray, USE ONE SPRAY Q 5 MINUTES AS NEEDED FOR CHEST PAIN  IF NO RELIEF, CALL 911   (Patient not taking: Reported on 7/15/2021), Disp: , Rfl: 1    valACYclovir (VALTREX) 1,000 mg tablet, Take 1 tablet (1,000 mg total) by mouth 2 (two) times a day for 7 days, Disp: 14 tablet, Rfl: 0    Laboratory Results:  Lab Results   Component Value Date    WBC 9 54 11/11/2020    HGB 12 1 11/11/2020    HCT 39 4 11/11/2020    MCV 89 11/11/2020     11/11/2020     Lab Results   Component Value Date    SODIUM 140 09/17/2021    K 3 9 09/17/2021     09/17/2021    CO2 26 09/17/2021    BUN 14 09/17/2021    CREATININE 1 40 (H) 09/17/2021    GLUC 102 09/17/2021    CALCIUM 9 3 09/17/2021     Lab Results   Component Value Date    CALCIUM 9 3 09/17/2021    PHOS 3 4 11/11/2020     No results found for: LABPROT

## 2021-09-21 ENCOUNTER — RA CDI HCC (OUTPATIENT)
Dept: OTHER | Facility: HOSPITAL | Age: 76
End: 2021-09-21

## 2021-09-21 ENCOUNTER — SOCIAL WORK (OUTPATIENT)
Dept: BEHAVIORAL/MENTAL HEALTH CLINIC | Facility: CLINIC | Age: 76
End: 2021-09-21
Payer: COMMERCIAL

## 2021-09-21 DIAGNOSIS — F33.2 MDD (MAJOR DEPRESSIVE DISORDER), RECURRENT SEVERE, WITHOUT PSYCHOSIS (HCC): Primary | ICD-10-CM

## 2021-09-21 PROCEDURE — 90834 PSYTX W PT 45 MINUTES: CPT | Performed by: SOCIAL WORKER

## 2021-09-21 NOTE — PSYCH
Assessment/Plan: Manage depression     There are no diagnoses linked to this encounter  Subjective: Cristina Bai has felt increasingly depressed and overwhelmed as she has struggled with shingles over last 13 weeks  Medicine has been helpful to manage itching in morning but this effectiveness has faded as day progresses  She has felt more depressed and "crying at drop of a hat"  Overwhelmed with her physical health issues as well as stress related to her daughter and ongoing stress related to her  as well  Unfortunately, she has been remanded to home since onset of shingles and this has not been helpful for her emotional health  Denies any SI  Patient ID: Lexy Calvo is a 77 y o  female  Met with Cristina Bai for 45 minutes from 10:30AM-11:15AM       Review of Systems   Psychiatric/Behavioral: Positive for dysphoric mood  The patient is nervous/anxious  Objective: Cristina Bai presents as depressed and is tearful at numerous points during session  She is verbal, cooperative, well oriented and engaged during session  Physical Exam  Psychiatric:         Attention and Perception: Attention and perception normal          Mood and Affect: Mood is anxious and depressed  Affect is tearful  Speech: Speech normal          Behavior: Behavior normal  Behavior is cooperative           Cognition and Memory: Cognition and memory normal          Judgment: Judgment normal

## 2021-09-21 NOTE — PATIENT INSTRUCTIONS
Processed stressors and their influence on her mood issues- validation and supportive therapy provided  Reviewed coping strategies and productive outlets to utilize on an increasing basis to help better manage mood issues  Itching and pain related to shingles a primary stressor

## 2021-09-21 NOTE — PROGRESS NOTES
Based on clinical documentation indicated in your record, it appears that the patient may have the following conditions:     I73 9 Claudication      M46 96 Lumbar facet arthropathy          If this is correct, please document and assess at your next visit     [place] [id number]  coding opportunities          Number of diagnosis code(s) already on the problem list added to  fla                     Patients insurance company: [employer] (Medicare Advantage and Commercial)             [place] [address]  coding opportunities          Number of diagnosis code(s) already on the problem list [facility]                  Number of suggestions NOT actually used: 2     Patients insurance company: [employer] (Medicare Advantage and Commercial)     Visit status: Patient arrived for their scheduled appointment

## 2021-09-22 ENCOUNTER — OFFICE VISIT (OUTPATIENT)
Dept: PAIN MEDICINE | Facility: CLINIC | Age: 76
End: 2021-09-22
Payer: COMMERCIAL

## 2021-09-22 VITALS
DIASTOLIC BLOOD PRESSURE: 65 MMHG | HEIGHT: 61 IN | RESPIRATION RATE: 16 BRPM | WEIGHT: 195 LBS | HEART RATE: 95 BPM | SYSTOLIC BLOOD PRESSURE: 143 MMHG | BODY MASS INDEX: 36.82 KG/M2

## 2021-09-22 DIAGNOSIS — G89.4 CHRONIC PAIN SYNDROME: Primary | ICD-10-CM

## 2021-09-22 DIAGNOSIS — M79.7 FIBROMYALGIA: Chronic | ICD-10-CM

## 2021-09-22 DIAGNOSIS — M47.816 LUMBAR SPONDYLOSIS: ICD-10-CM

## 2021-09-22 DIAGNOSIS — M47.812 SPONDYLOSIS OF CERVICAL REGION WITHOUT MYELOPATHY OR RADICULOPATHY: ICD-10-CM

## 2021-09-22 DIAGNOSIS — M48.062 SPINAL STENOSIS OF LUMBAR REGION WITH NEUROGENIC CLAUDICATION: Chronic | ICD-10-CM

## 2021-09-22 DIAGNOSIS — M79.18 MYOFASCIAL PAIN SYNDROME: ICD-10-CM

## 2021-09-22 PROCEDURE — 99214 OFFICE O/P EST MOD 30 MIN: CPT | Performed by: NURSE PRACTITIONER
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
## 2021-09-22 NOTE — PATIENT INSTRUCTIONS
Neck Pain   AMBULATORY CARE:   Neck pain  may be sudden and increase quickly  You may instead feel pain build slowly over time  Neck pain may go away in a few days or weeks, or it may continue for months  The pain may come and go, or be worse with certain movements  The pain may be only in your neck, or it may move to your arms, back, or shoulders  You may also have pain that starts in another body area and moves to your neck  Some types of neck pain are permanent  Seek care immediately if:   · You have an injury that causes neck pain and shooting pain down your arms or legs  · Your neck pain suddenly becomes severe  · You have neck pain along with numbness, tingling, or weakness in your arms or legs  · You have a stiff neck, a headache, and a fever  Contact your healthcare provider if:   · You have new or worsening symptoms  · Your symptoms continue even after treatment  · You have questions or concerns about your condition or care  Treatment  may include any of the following, depending on what is causing your pain:  · Medicines  may be prescribed or recommended by your healthcare provider for pain  You may need medicine to treat nerve pain or to stop muscle spasms  Medicines may also be given to reduce inflammation  Your healthcare provider may inject medicine into a nerve to block pain  Over-the-counter NSAID medicine or acetaminophen may be recommended to help treat minor pain or inflammation  · Traction  is used to relieve pressure from nerves  Your head is gently pulled up and away from your neck  This stretches muscles and ligaments and makes more room for the spine  Your healthcare provider will tell you the kind of traction that will help your neck pain  Do not use traction devices at home unless directed by your healthcare provider  · Surgery  may be needed if the pain is severe or other treatments do not work  Surgery will not help every kind of neck pain   You may need surgery to stabilize a fractured bone or to remove a tumor  Surgery may also be used to widen a narrowed spinal column or to remove a disc from between neck bones  Manage or prevent neck pain:   · Rest your neck as directed  Do not make sudden movements, such as turning your head quickly  Your healthcare provider may recommend you wear a cervical collar for a short time  The collar will prevent you from moving your head  This will give your neck time to heal if an injury is causing your neck pain  Ask your healthcare provider when you can return to sports or other normal daily activities  · Apply heat as directed  Heat helps relieve pain and swelling  Use a heat wrap, or soak a small towel in warm water  Wring out the extra water  Apply the heat wrap or towel for 20 minutes every hour, or as directed  · Apply ice as directed  Ice helps relieve pain and swelling, and can help prevent tissue damage  Use an ice pack, or put ice in a bag  Cover the ice pack or back with a towel before you apply it to your neck  Apply the ice pack or ice for 15 minutes every hour, or as directed  Your healthcare provider can tell you how often to apply ice  · Do neck exercises as directed  Neck exercises help strengthen the muscles and increase range of motion  Your healthcare provider will tell you which exercises are right for you  He may give you instructions, or he may recommend that you work with a physical therapist  Your healthcare provider or therapist can make sure you are doing the exercises correctly  · Maintain good posture  Try to keep your head and shoulders lifted when you sit  If you work in front of a computer, make sure the monitor is at the right level  You should not need to look up down to see the screen  You should also not have to lean forward to be able to read what is on the screen   Make sure your keyboard, mouse, and other computer items are placed where you do not have to extend your shoulder to reach them  Get up often if you work in front of a computer or sit for long periods of time  Stretch or walk around to keep your neck muscles loose  Follow up with your healthcare provider as directed: Your healthcare provider may refer you to a specialist if your pain does not get better with treatment  Write down your questions so you remember to ask them during your visits  © Copyright [employer] 2021 Information is for End User's use only and may not be sold, redistributed or otherwise used for commercial purposes  All illustrations and images included in CareNotes® are the copyrighted property of [employer]  or [facility] Ziggy Latham   The above information is an  only  It is not intended as medical advice for individual conditions or treatments  Talk to your doctor, nurse or pharmacist before following any medical regimen to see if it is safe and effective for you

## 2021-09-28 ENCOUNTER — OFFICE VISIT (OUTPATIENT)
Dept: INTERNAL MEDICINE CLINIC | Facility: CLINIC | Age: 76
End: 2021-09-28
Payer: COMMERCIAL

## 2021-09-28 VITALS
DIASTOLIC BLOOD PRESSURE: 60 MMHG | BODY MASS INDEX: 36.4 KG/M2 | TEMPERATURE: 98 F | RESPIRATION RATE: 16 BRPM | WEIGHT: 192.8 LBS | HEART RATE: 86 BPM | SYSTOLIC BLOOD PRESSURE: 148 MMHG | HEIGHT: 61 IN | OXYGEN SATURATION: 98 %

## 2021-09-28 DIAGNOSIS — M25.562 CHRONIC PAIN OF BOTH KNEES: ICD-10-CM

## 2021-09-28 DIAGNOSIS — M25.561 CHRONIC PAIN OF BOTH KNEES: ICD-10-CM

## 2021-09-28 DIAGNOSIS — G89.29 CHRONIC PAIN OF BOTH KNEES: ICD-10-CM

## 2021-09-28 DIAGNOSIS — B02.9 HERPES ZOSTER WITHOUT COMPLICATION: ICD-10-CM

## 2021-09-28 DIAGNOSIS — B02.29 POST HERPETIC NEURALGIA: Primary | ICD-10-CM

## 2021-09-28 PROBLEM — E03.9 HYPOTHYROIDISM: Status: ACTIVE | Noted: 2021-09-28

## 2021-09-28 PROBLEM — N18.32 STAGE 3B CHRONIC KIDNEY DISEASE (HCC): Status: ACTIVE | Noted: 2021-09-28

## 2021-09-28 PROCEDURE — 1160F RVW MEDS BY RX/DR IN RCRD: CPT | Performed by: INTERNAL MEDICINE

## 2021-09-28 PROCEDURE — 1036F TOBACCO NON-USER: CPT | Performed by: INTERNAL MEDICINE

## 2021-09-28 PROCEDURE — 99213 OFFICE O/P EST LOW 20 MIN: CPT | Performed by: INTERNAL MEDICINE

## 2021-09-28 PROCEDURE — 3008F BODY MASS INDEX DOCD: CPT | Performed by: INTERNAL MEDICINE

## 2021-09-28 NOTE — ASSESSMENT & PLAN NOTE
Patient is concerned she has ongoing herpes zoster she has been treated twice with valacyclovir she continues have ongoing post herpetic neuralgia will order a viral culture and ID consult at this point time  She will continue Fetzima, trazodone and follow-up with Psychiatry RTO in 4 weeks call if any problems

## 2021-09-28 NOTE — PROGRESS NOTES
Assessment/Plan:    Post herpetic neuralgia   Patient is concerned she has ongoing herpes zoster she has been treated twice with valacyclovir she continues have ongoing post herpetic neuralgia will order a viral culture and ID consult at this point time  She will continue Fetzima, trazodone and follow-up with Psychiatry RTO in 4 weeks call if any problems         Problem List Items Addressed This Visit        Nervous and Auditory    Post herpetic neuralgia - Primary      Patient is concerned she has ongoing herpes zoster she has been treated twice with valacyclovir she continues have ongoing post herpetic neuralgia will order a viral culture and ID consult at this point time  She will continue Fetzima, trazodone and follow-up with Psychiatry RTO in 4 weeks call if any problems            Other    Herpes zoster without complication    Relevant Orders    Ambulatory referral to Infectious Disease      Other Visit Diagnoses     Chronic pain of both knees        Relevant Orders    Ambulatory referral to Orthopedic Surgery    XR knee 3 vw right non injury    XR knee 3 vw left non injury           RTO in 4 weeks call if any problems  Subjective:      Patient ID: Dominik Chapa is a 77 y o  female  HPI  76-year-old female coming in for follow-up examination regarding post herpetic neuralgia, herpes zoster, chronic knee pain bilateral; she had been to pain management since last visit but did not find this to be helpful reports me only mild improvement with the team a; she is concerned she has ongoing vesicle an active zoster she has had 2 courses of valacyclovir  She feels small bumps on the nape of the neck and concerned these are vesicles at this point time she would like to get consult of Infectious Disease and have cultures completed  pain at 8 sane as past met with Dr Ton Luis "waste of time" sees Dr Tiffany Hemphill oct 27,     She also reports me ongoing bilateral knee pain would like treatment at this point time has a history of arthritis had shots in the past  The following portions of the patient's history were reviewed and updated as appropriate: allergies, current medications, past family history, past medical history, past social history, past surgical history and problem list     Review of Systems   Constitutional: Negative for activity change, appetite change and unexpected weight change  Eyes: Negative for visual disturbance  Respiratory: Negative for cough and shortness of breath  Cardiovascular: Negative for chest pain  Gastrointestinal: Negative for abdominal pain, diarrhea, nausea and vomiting  Musculoskeletal: Positive for back pain  Neurological: Negative for dizziness, light-headedness and headaches  Bilateral knee pain    Objective:    No follow-ups on file  No results found        Allergies   Allergen Reactions    Molds & Smuts Allergic Rhinitis    Other Allergic Rhinitis     RAGWEED, CAT DANDER, DOG DANDER     Pollen Extract Other (See Comments)     Cold symptoms         Past Medical History:   Diagnosis Date    Acute embolism and thrombosis of unspecified deep veins of unspecified lower extremity (HCC)     Last Assessed:  5/18/17    Anemia     Anosmia     Anxiety     Arthritis     Asthma     Back pain     Bilateral macular retinal edema     CAD (coronary artery disease)     Cataract     Cervical disc herniation     Cervical radiculopathy     Cervical spinal stenosis     Cervical spondylolysis     Chronic mastoiditis     Complex endometrial hyperplasia     Depression     Diabetes mellitus ([address] )     [facility]     Fibromyalgia     Hyperlipidemia     Hypertension     Hypothyroid     Lumbar radiculopathy     Neck pain     Obese     Shingles 07/01/2021    Spinal stenosis     Stomach ulcer     Thyroid disease      Past Surgical History:   Procedure Laterality Date    BACK SURGERY      CARPAL TUNNEL RELEASE      CATARACT EXTRACTION      CHOLECYSTECTOMY      COLONOSCOPY      CORONARY ANGIOPLASTY      CORONARY ARTERY BYPASS GRAFT      CYSTOSCOPY N/A 6/20/2017    Procedure: CYSTOSCOPY;  Surgeon: Teri Solis MD;  [facility];  Service: Gynecology Oncology    WI LAPAROSCOPY W TOT HYSTERECTUTERUS <=250 GRAM  W TUBE/OVARY N/A 6/20/2017    Procedure: ROBOTIC HYSTERECTOMY; BILATERAL SALPINO-OOPHERECTOMY; umbilical hernia repair ;  Surgeon: Teri Solis MD;  [facility];  Service: Gynecology Oncology    TONSILECTOMY AND ADNOIDECTOMY      UMBILICAL HERNIA REPAIR       Current Outpatient Medications on File Prior to Visit   Medication Sig Dispense Refill    ALPRAZolam (XANAX) 0 5 mg tablet Take 1 tablet (0 5 mg total) by mouth 2 (two) times a day as needed for anxiety 60 tablet 1    aspirin 81 MG tablet Take 81 mg by mouth daily      B-D UF III MINI PEN NEEDLES 31G X 5 MM MISC       cholecalciferol (VITAMIN D3) 1,000 units tablet Take 2,000 Units by mouth daily      diclofenac sodium (VOLTAREN) 1 % Apply 2 g topically 4 (four) times a day 1 Tube 0    diltiazem (TIAZAC) 300 MG 24 hr capsule Take 300 mg by mouth daily      enalapril (VASOTEC) 20 mg tablet Take 20 mg by mouth daily      insulin aspart (NovoLOG) 100 units/mL injection Inject 12 Units under the skin 3 (three) times a day before meals (Patient taking differently: Inject under the skin 3 (three) times a day before meals )  0    Insulin Disposable Pump (OmniPod Dash 5 Pack Pods) MISC USE 1 POD EVERY 1 5 DAYS      Insulin Disposable Pump (OMNIPOD DASH 5 PACK) MISC CHANGE PODS EVERY 2 DAYS UTD  3    isosorbide mononitrate (IMDUR) 60 mg 24 hr tablet TAKE 1 5 TABs AT NIGHT      Levomilnacipran HCl ER (Fetzima) 40 MG extended release capsule Take 1 capsule (40 mg total) by mouth daily 30 capsule 1    levothyroxine 50 mcg tablet Take 50 mcg by mouth daily      rosuvastatin (CRESTOR) 20 MG tablet Take 20 mg by mouth every evening  2    traZODone (DESYREL) 50 mg tablet TAKE 0 5-1 TABLETS (25-50 MG TOTAL) BY MOUTH DAILY AT BEDTIME AS NEEDED FOR SLEEP 30 tablet 1    albuterol (2 5 mg/3 mL) 0 083 % nebulizer solution Take 3 mL (2 5 mg total) by nebulization every 6 (six) hours as needed for wheezing (Patient not taking: Reported on 7/15/2021) 75 mL 0    albuterol (PROAIR HFA) 90 mcg/act inhaler Inhale 2 puffs every 6 (six) hours as needed  (Patient not taking: Reported on 7/15/2021)      BAQSIMI TWO PACK 3 MG/DOSE POWD Use as directed (Patient not taking: Reported on 8/5/2021)  0    betamethasone dipropionate 0 05 % lotion  (Patient not taking: Reported on 7/15/2021)      docusate sodium (COLACE) 100 mg capsule Take 1 capsule (100 mg total) by mouth 2 (two) times a day (Patient not taking: Reported on 7/15/2021) 20 capsule 0    naloxone (NARCAN) 4 mg/0 1 mL nasal spray Administer 1 spray into a nostril  If no response after 2-3 minutes, give another dose in the other nostril using a new spray  (Patient not taking: Reported on 8/5/2021) 1 each 1    nitroglycerin (NITROLINGUAL) 0 4 mg/spray spray USE ONE SPRAY Q 5 MINUTES AS NEEDED FOR CHEST PAIN  IF NO RELIEF, CALL 911  (Patient not taking: Reported on 7/15/2021)  1    valACYclovir (VALTREX) 1,000 mg tablet Take 1 tablet (1,000 mg total) by mouth 2 (two) times a day for 7 days 14 tablet 0     No current facility-administered medications on file prior to visit       Family History   Problem Relation Age of Onset    Arthritis Mother     Leukemia Mother     Other Mother         Anxiety, major depressive disorder, recurrent episode with atypical features    Coronary artery disease Father         Heart problem    Diabetes Father     Other Father         Infectious disease    Alzheimer's disease Maternal Grandmother     Other Maternal Grandfather         Heart problem    Other Daughter         Anxiety, major depressive disorder, recurrent episode with atypical features    Alcohol abuse Other         Grandparent  Cancer Family     Diabetes Family     Hypertension Family     Other Family         Reported prior back trouble, thyroid disorder     Social History     Socioeconomic History    Marital status: /Civil Union     Spouse name: Not on file    Number of children: 2    Years of education: High school or GED    Highest education level: Not on file   Occupational History    Occupation: Retired   Tobacco Use    Smoking status: Former Smoker     Packs/day: 1 00     Years: 6 00     Pack years: 6 00     Types: Cigarettes     Quit date:      Years since quittin 7    Smokeless tobacco: Never Used    Tobacco comment: Smoked about a half a pack for about 4 yrs  Quite about 50 yrs ago  Vaping Use    Vaping Use: Never used   Substance and Sexual Activity    Alcohol use: No    Drug use: No    Sexual activity: Never     Comment: No known STD risk factors   Other Topics Concern    Not on file   Social History Narrative    Lives independently with spouse    No known risk factors    Denied:  Exercising regularly     Social Determinants of Health     Financial Resource Strain:     Difficulty of Paying Living Expenses:    Food Insecurity:     Worried About Running Out of Food in the Last Year:     [address] in the Last Year:    Transportation Needs:     Lack of Transportation (Medical):      Lack of Transportation (Non-Medical):    Physical Activity:     Days of Exercise per Week:     Minutes of Exercise per Session:    Stress:     Feeling of Stress :    Social Connections:     Frequency of Communication with Friends and Family:     Frequency of Social Gatherings with Friends and Family:     Attends Yazidism Services:     Active Member of Clubs or Organizations:     Attends Club or Organization Meetings:     Marital Status:    Intimate Partner Violence:     Fear of Current or Ex-Partner:     Emotionally Abused:     Physically Abused:     Sexually Abused:      Vitals:    21 1409   BP: 148/60   BP Location: Left arm   Pulse: 86   Resp: 16   Temp: 98 °F (36 7 °C)   SpO2: 98%   Weight: 87 5 kg (192 lb 12 8 oz)   Height: 5' 1" (1 549 m)     Results for orders placed or performed in visit on 81/15/81   Basic metabolic panel   Result Value Ref Range    Sodium 140 136 - 145 mmol/L    Potassium 3 9 3 5 - 5 3 mmol/L    Chloride 102 100 - 108 mmol/L    CO2 26 21 - 32 mmol/L    ANION GAP 12 4 - 13 mmol/L    BUN 14 5 - 25 mg/dL    Creatinine 1 40 (H) 0 60 - 1 30 mg/dL    Glucose 102 65 - 140 mg/dL    Calcium 9 3 8 3 - 10 1 mg/dL    eGFR 37 ml/min/1 73sq m   Protein / creatinine ratio, urine   Result Value Ref Range    Creatinine, Ur 136 0 mg/dL    Protein Urine Random 36 mg/dL    Prot/Creat Ratio, Ur 0 26 (H) 0 00 - 0 10     Weight (last 2 days)     Date/Time   Weight    09/28/21 1409   87 5 (192 8)            Body mass index is 36 43 kg/m²  BP      Temp      Pulse     Resp      SpO2        Vitals:    09/28/21 1409   Weight: 87 5 kg (192 lb 12 8 oz)     Vitals:    09/28/21 1409   Weight: 87 5 kg (192 lb 12 8 oz)       /60 (BP Location: Left arm)   Pulse 86   Temp 98 °F (36 7 °C)   Resp 16   Ht 5' 1" (1 549 m)   Wt 87 5 kg (192 lb 12 8 oz)   SpO2 98%   BMI 36 43 kg/m²          Physical Exam  Constitutional:       Appearance: She is well-developed  HENT:      Head: Normocephalic  Eyes:      General: No scleral icterus  Right eye: No discharge  Left eye: No discharge  Conjunctiva/sclera: Conjunctivae normal       Pupils: Pupils are equal, round, and reactive to light  Cardiovascular:      Rate and Rhythm: Normal rate and regular rhythm  Heart sounds: Normal heart sounds  No murmur heard  No friction rub  No gallop  Pulmonary:      Effort: No respiratory distress  Breath sounds: Normal breath sounds  No wheezing or rales  Abdominal:      Palpations: Abdomen is soft  Musculoskeletal:         General: No deformity        Cervical back: Neck supple  Lymphadenopathy:      Cervical: No cervical adenopathy  Neurological:      Mental Status: She is alert          Tiny  Papules no vesicles noted no erythema noted neck and shoulder

## 2021-10-01 ENCOUNTER — CLINICAL SUPPORT (OUTPATIENT)
Dept: INTERNAL MEDICINE CLINIC | Facility: CLINIC | Age: 76
End: 2021-10-01
Payer: COMMERCIAL

## 2021-10-01 DIAGNOSIS — L98.9 SKIN LESION: Primary | ICD-10-CM

## 2021-10-01 PROCEDURE — 99211 OFF/OP EST MAY X REQ PHY/QHP: CPT

## 2021-10-01 PROCEDURE — 87252 VIRUS INOCULATION TISSUE: CPT | Performed by: INTERNAL MEDICINE

## 2021-10-05 ENCOUNTER — SOCIAL WORK (OUTPATIENT)
Dept: BEHAVIORAL/MENTAL HEALTH CLINIC | Facility: CLINIC | Age: 76
End: 2021-10-05
Payer: COMMERCIAL

## 2021-10-05 DIAGNOSIS — F33.2 MDD (MAJOR DEPRESSIVE DISORDER), RECURRENT SEVERE, WITHOUT PSYCHOSIS (HCC): ICD-10-CM

## 2021-10-05 DIAGNOSIS — F41.1 GAD (GENERALIZED ANXIETY DISORDER): ICD-10-CM

## 2021-10-05 DIAGNOSIS — F33.2 MDD (MAJOR DEPRESSIVE DISORDER), RECURRENT SEVERE, WITHOUT PSYCHOSIS (HCC): Primary | ICD-10-CM

## 2021-10-05 DIAGNOSIS — F41.0 PANIC ATTACKS: ICD-10-CM

## 2021-10-05 PROCEDURE — 90834 PSYTX W PT 45 MINUTES: CPT | Performed by: SOCIAL WORKER

## 2021-10-05 RX ORDER — TRAZODONE HYDROCHLORIDE 50 MG/1
25-50 TABLET ORAL
Qty: 30 TABLET | Refills: 1 | Status: SHIPPED | OUTPATIENT
Start: 2021-10-05 | End: 2021-10-21 | Stop reason: SDUPTHER

## 2021-10-13 DIAGNOSIS — F41.0 PANIC ATTACKS: ICD-10-CM

## 2021-10-13 DIAGNOSIS — F41.1 GAD (GENERALIZED ANXIETY DISORDER): ICD-10-CM

## 2021-10-13 DIAGNOSIS — F41.9 ANXIETY: ICD-10-CM

## 2021-10-14 RX ORDER — ALPRAZOLAM 0.5 MG/1
0.5 TABLET ORAL 2 TIMES DAILY PRN
Qty: 60 TABLET | Refills: 1 | Status: SHIPPED | OUTPATIENT
Start: 2021-10-14 | End: 2021-12-14 | Stop reason: SDUPTHER

## 2021-10-21 ENCOUNTER — OFFICE VISIT (OUTPATIENT)
Dept: PSYCHIATRY | Facility: CLINIC | Age: 76
End: 2021-10-21
Payer: COMMERCIAL

## 2021-10-21 DIAGNOSIS — F33.2 MDD (MAJOR DEPRESSIVE DISORDER), RECURRENT SEVERE, WITHOUT PSYCHOSIS (HCC): ICD-10-CM

## 2021-10-21 DIAGNOSIS — F41.9 ANXIETY: ICD-10-CM

## 2021-10-21 DIAGNOSIS — F41.0 PANIC ATTACKS: ICD-10-CM

## 2021-10-21 DIAGNOSIS — F41.1 GAD (GENERALIZED ANXIETY DISORDER): ICD-10-CM

## 2021-10-21 DIAGNOSIS — M79.7 FIBROMYALGIA: ICD-10-CM

## 2021-10-21 PROCEDURE — 1160F RVW MEDS BY RX/DR IN RCRD: CPT | Performed by: PSYCHIATRY & NEUROLOGY

## 2021-10-21 PROCEDURE — 99214 OFFICE O/P EST MOD 30 MIN: CPT | Performed by: PSYCHIATRY & NEUROLOGY

## 2021-10-21 RX ORDER — TRAZODONE HYDROCHLORIDE 50 MG/1
25-50 TABLET ORAL
Qty: 30 TABLET | Refills: 1 | Status: SHIPPED | OUTPATIENT
Start: 2021-10-21 | End: 2021-11-05

## 2021-10-26 ENCOUNTER — SOCIAL WORK (OUTPATIENT)
Dept: BEHAVIORAL/MENTAL HEALTH CLINIC | Facility: CLINIC | Age: 76
End: 2021-10-26
Payer: COMMERCIAL

## 2021-10-26 DIAGNOSIS — F41.1 GAD (GENERALIZED ANXIETY DISORDER): Primary | ICD-10-CM

## 2021-10-26 PROCEDURE — 90834 PSYTX W PT 45 MINUTES: CPT | Performed by: SOCIAL WORKER

## 2021-11-05 DIAGNOSIS — F33.2 MDD (MAJOR DEPRESSIVE DISORDER), RECURRENT SEVERE, WITHOUT PSYCHOSIS (HCC): ICD-10-CM

## 2021-11-05 DIAGNOSIS — F41.1 GAD (GENERALIZED ANXIETY DISORDER): ICD-10-CM

## 2021-11-05 DIAGNOSIS — F41.0 PANIC ATTACKS: ICD-10-CM

## 2021-11-05 RX ORDER — TRAZODONE HYDROCHLORIDE 50 MG/1
25-50 TABLET ORAL
Qty: 30 TABLET | Refills: 1 | Status: SHIPPED | OUTPATIENT
Start: 2021-11-05 | End: 2021-12-04

## 2021-11-08 RX ORDER — FLUOCINONIDE TOPICAL SOLUTION USP, 0.05% 0.5 MG/ML
SOLUTION TOPICAL
COMMUNITY
Start: 2021-11-03 | End: 2022-05-06

## 2021-11-09 ENCOUNTER — HOSPITAL ENCOUNTER (OUTPATIENT)
Dept: RADIOLOGY | Facility: HOSPITAL | Age: 76
Discharge: HOME | End: 2021-11-09
Attending: ORTHOPAEDIC SURGERY
Payer: COMMERCIAL

## 2021-11-09 ENCOUNTER — OFFICE VISIT (OUTPATIENT)
Dept: OBGYN CLINIC | Facility: HOSPITAL | Age: 76
End: 2021-11-09
Payer: COMMERCIAL

## 2021-11-09 VITALS
WEIGHT: 194 LBS | SYSTOLIC BLOOD PRESSURE: 128 MMHG | DIASTOLIC BLOOD PRESSURE: 77 MMHG | HEIGHT: 61 IN | HEART RATE: 75 BPM | BODY MASS INDEX: 36.63 KG/M2

## 2021-11-09 DIAGNOSIS — M17.0 BILATERAL PRIMARY OSTEOARTHRITIS OF KNEE: Primary | ICD-10-CM

## 2021-11-09 DIAGNOSIS — M25.561 CHRONIC PAIN OF BOTH KNEES: ICD-10-CM

## 2021-11-09 DIAGNOSIS — G89.29 CHRONIC PAIN OF BOTH KNEES: ICD-10-CM

## 2021-11-09 DIAGNOSIS — M25.562 PAIN IN BOTH KNEES, UNSPECIFIED CHRONICITY: ICD-10-CM

## 2021-11-09 DIAGNOSIS — M25.562 CHRONIC PAIN OF BOTH KNEES: ICD-10-CM

## 2021-11-09 DIAGNOSIS — M25.561 PAIN IN BOTH KNEES, UNSPECIFIED CHRONICITY: ICD-10-CM

## 2021-11-09 PROCEDURE — 1036F TOBACCO NON-USER: CPT | Performed by: ORTHOPAEDIC SURGERY

## 2021-11-09 PROCEDURE — 3008F BODY MASS INDEX DOCD: CPT | Performed by: ORTHOPAEDIC SURGERY

## 2021-11-09 PROCEDURE — 99203 OFFICE O/P NEW LOW 30 MIN: CPT | Performed by: ORTHOPAEDIC SURGERY

## 2021-11-09 PROCEDURE — 20610 DRAIN/INJ JOINT/BURSA W/O US: CPT | Performed by: ORTHOPAEDIC SURGERY

## 2021-11-09 PROCEDURE — 73562 X-RAY EXAM OF KNEE 3: CPT

## 2021-11-09 PROCEDURE — 1160F RVW MEDS BY RX/DR IN RCRD: CPT | Performed by: ORTHOPAEDIC SURGERY

## 2021-11-09 RX ORDER — LIDOCAINE HYDROCHLORIDE 10 MG/ML
2 INJECTION, SOLUTION INFILTRATION; PERINEURAL
Status: COMPLETED | OUTPATIENT
Start: 2021-11-09 | End: 2021-11-09

## 2021-11-09 RX ORDER — BUPIVACAINE HYDROCHLORIDE 2.5 MG/ML
2 INJECTION, SOLUTION INFILTRATION; PERINEURAL
Status: COMPLETED | OUTPATIENT
Start: 2021-11-09 | End: 2021-11-09

## 2021-11-09 RX ORDER — KETOROLAC TROMETHAMINE 30 MG/ML
60 INJECTION, SOLUTION INTRAMUSCULAR; INTRAVENOUS
Status: COMPLETED | OUTPATIENT
Start: 2021-11-09 | End: 2021-11-09

## 2021-11-09 RX ADMIN — KETOROLAC TROMETHAMINE 60 MG: 30 INJECTION, SOLUTION INTRAMUSCULAR; INTRAVENOUS at 15:27

## 2021-11-09 RX ADMIN — LIDOCAINE HYDROCHLORIDE 2 ML: 10 INJECTION, SOLUTION INFILTRATION; PERINEURAL at 15:27

## 2021-11-09 RX ADMIN — BUPIVACAINE HYDROCHLORIDE 2 ML: 2.5 INJECTION, SOLUTION INFILTRATION; PERINEURAL at 15:27

## 2021-11-15 ENCOUNTER — TELEPHONE (OUTPATIENT)
Dept: NEPHROLOGY | Facility: CLINIC | Age: 76
End: 2021-11-15

## 2021-11-16 ENCOUNTER — TELEPHONE (OUTPATIENT)
Dept: INTERNAL MEDICINE CLINIC | Facility: CLINIC | Age: 76
End: 2021-11-16

## 2021-11-16 ENCOUNTER — SOCIAL WORK (OUTPATIENT)
Dept: BEHAVIORAL/MENTAL HEALTH CLINIC | Facility: CLINIC | Age: 76
End: 2021-11-16
Payer: COMMERCIAL

## 2021-11-16 ENCOUNTER — TELEPHONE (OUTPATIENT)
Dept: PSYCHIATRY | Facility: CLINIC | Age: 76
End: 2021-11-16

## 2021-11-16 DIAGNOSIS — F41.1 GAD (GENERALIZED ANXIETY DISORDER): Primary | ICD-10-CM

## 2021-11-16 PROCEDURE — 90834 PSYTX W PT 45 MINUTES: CPT | Performed by: SOCIAL WORKER

## 2021-11-17 ENCOUNTER — TELEPHONE (OUTPATIENT)
Dept: INTERNAL MEDICINE CLINIC | Facility: CLINIC | Age: 76
End: 2021-11-17

## 2021-11-17 DIAGNOSIS — M79.7 FIBROMYALGIA: ICD-10-CM

## 2021-11-17 DIAGNOSIS — F41.1 GAD (GENERALIZED ANXIETY DISORDER): ICD-10-CM

## 2021-11-17 DIAGNOSIS — F33.2 MDD (MAJOR DEPRESSIVE DISORDER), RECURRENT SEVERE, WITHOUT PSYCHOSIS (HCC): Primary | ICD-10-CM

## 2021-11-17 RX ORDER — DULOXETIN HYDROCHLORIDE 30 MG/1
CAPSULE, DELAYED RELEASE ORAL
Qty: 60 CAPSULE | Refills: 1 | Status: SHIPPED | OUTPATIENT
Start: 2021-11-17 | End: 2021-12-09

## 2021-11-18 ENCOUNTER — CLINICAL SUPPORT (OUTPATIENT)
Dept: INTERNAL MEDICINE CLINIC | Facility: CLINIC | Age: 76
End: 2021-11-18
Payer: COMMERCIAL

## 2021-11-18 DIAGNOSIS — Z23 NEED FOR VACCINATION: Primary | ICD-10-CM

## 2021-11-18 PROCEDURE — G0008 ADMIN INFLUENZA VIRUS VAC: HCPCS

## 2021-11-18 PROCEDURE — 90662 IIV NO PRSV INCREASED AG IM: CPT

## 2021-11-30 ENCOUNTER — RA CDI HCC (OUTPATIENT)
Dept: OTHER | Facility: HOSPITAL | Age: 76
End: 2021-11-30

## 2021-12-03 DIAGNOSIS — F33.2 MDD (MAJOR DEPRESSIVE DISORDER), RECURRENT SEVERE, WITHOUT PSYCHOSIS (HCC): ICD-10-CM

## 2021-12-03 DIAGNOSIS — F41.1 GAD (GENERALIZED ANXIETY DISORDER): ICD-10-CM

## 2021-12-03 DIAGNOSIS — F41.0 PANIC ATTACKS: ICD-10-CM

## 2021-12-04 RX ORDER — TRAZODONE HYDROCHLORIDE 50 MG/1
25-50 TABLET ORAL
Qty: 30 TABLET | Refills: 1 | Status: SHIPPED | OUTPATIENT
Start: 2021-12-04 | End: 2021-12-18

## 2021-12-07 ENCOUNTER — SOCIAL WORK (OUTPATIENT)
Dept: BEHAVIORAL/MENTAL HEALTH CLINIC | Facility: CLINIC | Age: 76
End: 2021-12-07
Payer: COMMERCIAL

## 2021-12-07 ENCOUNTER — OFFICE VISIT (OUTPATIENT)
Dept: INTERNAL MEDICINE CLINIC | Facility: CLINIC | Age: 76
End: 2021-12-07
Payer: COMMERCIAL

## 2021-12-07 VITALS
DIASTOLIC BLOOD PRESSURE: 60 MMHG | RESPIRATION RATE: 20 BRPM | BODY MASS INDEX: 36.59 KG/M2 | OXYGEN SATURATION: 98 % | WEIGHT: 193.8 LBS | SYSTOLIC BLOOD PRESSURE: 132 MMHG | HEIGHT: 61 IN | HEART RATE: 71 BPM

## 2021-12-07 DIAGNOSIS — K63.5 POLYP OF COLON, UNSPECIFIED PART OF COLON, UNSPECIFIED TYPE: Primary | ICD-10-CM

## 2021-12-07 DIAGNOSIS — F41.1 GAD (GENERALIZED ANXIETY DISORDER): Primary | ICD-10-CM

## 2021-12-07 DIAGNOSIS — N18.4 CHRONIC RENAL IMPAIRMENT, STAGE 4 (SEVERE) (HCC): ICD-10-CM

## 2021-12-07 DIAGNOSIS — Z23 ENCOUNTER FOR IMMUNIZATION: ICD-10-CM

## 2021-12-07 DIAGNOSIS — Z11.59 NEED FOR HEPATITIS C SCREENING TEST: ICD-10-CM

## 2021-12-07 DIAGNOSIS — B02.9 HERPES ZOSTER WITHOUT COMPLICATION: ICD-10-CM

## 2021-12-07 DIAGNOSIS — H53.2 DIPLOPIA: ICD-10-CM

## 2021-12-07 DIAGNOSIS — Z23 NEED FOR SHINGLES VACCINE: ICD-10-CM

## 2021-12-07 DIAGNOSIS — I10 ESSENTIAL HYPERTENSION: Chronic | ICD-10-CM

## 2021-12-07 DIAGNOSIS — F33.2 MDD (MAJOR DEPRESSIVE DISORDER), RECURRENT SEVERE, WITHOUT PSYCHOSIS (HCC): ICD-10-CM

## 2021-12-07 DIAGNOSIS — N18.32 STAGE 3B CHRONIC KIDNEY DISEASE (HCC): ICD-10-CM

## 2021-12-07 DIAGNOSIS — E11.42 DIABETIC POLYNEUROPATHY ASSOCIATED WITH TYPE 2 DIABETES MELLITUS (HCC): ICD-10-CM

## 2021-12-07 DIAGNOSIS — Z12.31 ENCOUNTER FOR SCREENING MAMMOGRAM FOR BREAST CANCER: ICD-10-CM

## 2021-12-07 PROCEDURE — 3066F NEPHROPATHY DOC TX: CPT | Performed by: NURSE PRACTITIONER

## 2021-12-07 PROCEDURE — 99214 OFFICE O/P EST MOD 30 MIN: CPT | Performed by: INTERNAL MEDICINE

## 2021-12-07 PROCEDURE — 90834 PSYTX W PT 45 MINUTES: CPT | Performed by: SOCIAL WORKER

## 2021-12-07 RX ORDER — ZOSTER VACCINE RECOMBINANT, ADJUVANTED 50 MCG/0.5
0.5 KIT INTRAMUSCULAR ONCE
Qty: 1 EACH | Refills: 1 | Status: SHIPPED | OUTPATIENT
Start: 2021-12-07 | End: 2021-12-07

## 2021-12-09 DIAGNOSIS — F33.2 MDD (MAJOR DEPRESSIVE DISORDER), RECURRENT SEVERE, WITHOUT PSYCHOSIS (HCC): ICD-10-CM

## 2021-12-09 DIAGNOSIS — M79.7 FIBROMYALGIA: ICD-10-CM

## 2021-12-09 DIAGNOSIS — F41.1 GAD (GENERALIZED ANXIETY DISORDER): ICD-10-CM

## 2021-12-09 RX ORDER — DULOXETIN HYDROCHLORIDE 30 MG/1
CAPSULE, DELAYED RELEASE ORAL
Qty: 60 CAPSULE | Refills: 1 | Status: SHIPPED | OUTPATIENT
Start: 2021-12-09 | End: 2022-01-12 | Stop reason: DRUGHIGH

## 2021-12-14 DIAGNOSIS — F41.9 ANXIETY: ICD-10-CM

## 2021-12-14 DIAGNOSIS — F41.0 PANIC ATTACKS: ICD-10-CM

## 2021-12-14 DIAGNOSIS — F41.1 GAD (GENERALIZED ANXIETY DISORDER): ICD-10-CM

## 2021-12-15 LAB
ALBUMIN SERPL-MCNC: 4 G/DL (ref 3.6–5.1)
ALBUMIN/GLOB SERPL: 1.2 (CALC) (ref 1–2.5)
ALP SERPL-CCNC: 105 U/L (ref 37–153)
ALT SERPL-CCNC: 20 U/L (ref 6–29)
AST SERPL-CCNC: 18 U/L (ref 10–35)
BILIRUB SERPL-MCNC: 0.4 MG/DL (ref 0.2–1.2)
BUN SERPL-MCNC: 25 MG/DL (ref 7–25)
BUN/CREAT SERPL: 18 (CALC) (ref 6–22)
CALCIUM SERPL-MCNC: 9.6 MG/DL (ref 8.6–10.4)
CHLORIDE SERPL-SCNC: 103 MMOL/L (ref 98–110)
CHOLEST SERPL-MCNC: 135 MG/DL
CHOLEST/HDLC SERPL: 2.6 (CALC)
CO2 SERPL-SCNC: 26 MMOL/L (ref 20–32)
CREAT SERPL-MCNC: 1.4 MG/DL (ref 0.6–0.93)
GLOBULIN SER CALC-MCNC: 3.3 G/DL (CALC) (ref 1.9–3.7)
GLUCOSE SERPL-MCNC: 126 MG/DL (ref 65–99)
HBA1C MFR BLD: 7.2 % OF TOTAL HGB
HCV AB S/CO SERPL IA: 0.03
HCV AB SERPL QL IA: NORMAL
HDLC SERPL-MCNC: 52 MG/DL
LDLC SERPL CALC-MCNC: 64 MG/DL (CALC)
METHYLMALONATE SERPL-SCNC: 332 NMOL/L (ref 87–318)
NONHDLC SERPL-MCNC: 83 MG/DL (CALC)
POTASSIUM SERPL-SCNC: 4.7 MMOL/L (ref 3.5–5.3)
PROT SERPL-MCNC: 7.3 G/DL (ref 6.1–8.1)
SL AMB EGFR AFRICAN AMERICAN: 43 ML/MIN/1.73M2
SL AMB EGFR NON AFRICAN AMERICAN: 37 ML/MIN/1.73M2
SODIUM SERPL-SCNC: 138 MMOL/L (ref 135–146)
TRIGL SERPL-MCNC: 111 MG/DL

## 2021-12-15 PROCEDURE — 3051F HG A1C>EQUAL 7.0%<8.0%: CPT | Performed by: NURSE PRACTITIONER

## 2021-12-15 RX ORDER — ALPRAZOLAM 0.5 MG/1
0.5 TABLET ORAL 2 TIMES DAILY PRN
Qty: 60 TABLET | Refills: 1 | Status: SHIPPED | OUTPATIENT
Start: 2021-12-15 | End: 2022-02-14 | Stop reason: SDUPTHER

## 2021-12-17 DIAGNOSIS — F33.2 MDD (MAJOR DEPRESSIVE DISORDER), RECURRENT SEVERE, WITHOUT PSYCHOSIS (HCC): ICD-10-CM

## 2021-12-17 DIAGNOSIS — F41.0 PANIC ATTACKS: ICD-10-CM

## 2021-12-17 DIAGNOSIS — F41.1 GAD (GENERALIZED ANXIETY DISORDER): ICD-10-CM

## 2021-12-18 RX ORDER — TRAZODONE HYDROCHLORIDE 50 MG/1
25-50 TABLET ORAL
Qty: 90 TABLET | Refills: 1 | Status: SHIPPED | OUTPATIENT
Start: 2021-12-18

## 2021-12-22 ENCOUNTER — OFFICE VISIT (OUTPATIENT)
Dept: INTERNAL MEDICINE CLINIC | Facility: CLINIC | Age: 76
End: 2021-12-22
Payer: COMMERCIAL

## 2021-12-22 VITALS
DIASTOLIC BLOOD PRESSURE: 74 MMHG | SYSTOLIC BLOOD PRESSURE: 152 MMHG | BODY MASS INDEX: 36.44 KG/M2 | HEIGHT: 62 IN | OXYGEN SATURATION: 98 % | HEART RATE: 104 BPM | RESPIRATION RATE: 16 BRPM | WEIGHT: 198 LBS

## 2021-12-22 DIAGNOSIS — N30.00 ACUTE CYSTITIS WITHOUT HEMATURIA: Primary | ICD-10-CM

## 2021-12-22 LAB
SL AMB  POCT GLUCOSE, UA: ABNORMAL
SL AMB LEUKOCYTE ESTERASE,UA: ABNORMAL
SL AMB POCT BILIRUBIN,UA: ABNORMAL
SL AMB POCT BLOOD,UA: ABNORMAL
SL AMB POCT CLARITY,UA: CLEAR
SL AMB POCT COLOR,UA: YELLOW
SL AMB POCT KETONES,UA: ABNORMAL
SL AMB POCT NITRITE,UA: ABNORMAL
SL AMB POCT PH,UA: 6
SL AMB POCT SPECIFIC GRAVITY,UA: 1.02
SL AMB POCT URINE PROTEIN: ABNORMAL
SL AMB POCT UROBILINOGEN: 3.5

## 2021-12-22 PROCEDURE — 1160F RVW MEDS BY RX/DR IN RCRD: CPT | Performed by: NURSE PRACTITIONER

## 2021-12-22 PROCEDURE — 3008F BODY MASS INDEX DOCD: CPT | Performed by: NURSE PRACTITIONER

## 2021-12-22 PROCEDURE — 3061F NEG MICROALBUMINURIA REV: CPT | Performed by: NURSE PRACTITIONER

## 2021-12-22 PROCEDURE — 99213 OFFICE O/P EST LOW 20 MIN: CPT | Performed by: NURSE PRACTITIONER

## 2021-12-22 PROCEDURE — 1036F TOBACCO NON-USER: CPT | Performed by: NURSE PRACTITIONER

## 2021-12-22 PROCEDURE — 81002 URINALYSIS NONAUTO W/O SCOPE: CPT | Performed by: NURSE PRACTITIONER

## 2021-12-22 RX ORDER — CIPROFLOXACIN 500 MG/1
500 TABLET, FILM COATED ORAL EVERY 12 HOURS SCHEDULED
Qty: 14 TABLET | Refills: 0 | Status: SHIPPED | OUTPATIENT
Start: 2021-12-22 | End: 2021-12-23

## 2022-01-04 ENCOUNTER — SOCIAL WORK (OUTPATIENT)
Dept: BEHAVIORAL/MENTAL HEALTH CLINIC | Facility: CLINIC | Age: 77
End: 2022-01-04
Payer: COMMERCIAL

## 2022-01-04 DIAGNOSIS — F41.1 GAD (GENERALIZED ANXIETY DISORDER): Primary | ICD-10-CM

## 2022-01-04 PROCEDURE — 90834 PSYTX W PT 45 MINUTES: CPT | Performed by: SOCIAL WORKER

## 2022-01-04 NOTE — PSYCH
Problem List Items Addressed This Visit        Other    MADAN (generalized anxiety disorder) - Primary          D- Brett Duarte continues to deal with consistent set of stressors including ongoing issues and stress related to her daughter  Marital stress and financial stress  Managing these the best she can  Has been very active around home and has been more assertive with others regarding her own needs and struggles  Better able to do this as her mood has remained stable on Duloxetine  Denies any acute anxiety issues  Able to enjoy parts of the holidays with her family  Mel Gavin denies any acute issues and presents accordingly  She is pleasant, verbal, cooperative, well oriented and engaged during session  Affect remains brighter  No tearfulness at any point during sessions  Affable and able to joke at times during session  Thoughts logical and goal directed  Appropriately dressed and groomed  P- processed stressors during session- validation and supportive therapy provided  Reviewed stress mgmt strategies and productive outlets to utilize to manage stress and strictly for her own benefit  Reviewed appropriate boundaries and expectations to maintain with others around her moving forward  Psychotherapy Provided: Individual Psychotherapy 55 minutes from 3:45PM-4:40PM    Length of time in session: 55 minutes, follow up in 4 week    Encounter Diagnosis     ICD-10-CM    1  MADAN (generalized anxiety disorder)  F41 1        Goals addressed in session: Goal 1     Pain:      mild-moderate    4    Current suicide risk : Low       Behavioral Health Treatment Plan [facility]: Diagnosis and Treatment Plan explained to Indra Hess relates understanding diagnosis and is agreeable to Treatment Plan   Yes

## 2022-01-06 ENCOUNTER — VBI (OUTPATIENT)
Dept: ADMINISTRATIVE | Facility: OTHER | Age: 77
End: 2022-01-06

## 2022-01-12 DIAGNOSIS — M79.7 FIBROMYALGIA: ICD-10-CM

## 2022-01-12 DIAGNOSIS — F33.2 MDD (MAJOR DEPRESSIVE DISORDER), RECURRENT SEVERE, WITHOUT PSYCHOSIS (HCC): ICD-10-CM

## 2022-01-12 DIAGNOSIS — F41.1 GAD (GENERALIZED ANXIETY DISORDER): ICD-10-CM

## 2022-01-12 RX ORDER — DULOXETIN HYDROCHLORIDE 60 MG/1
60 CAPSULE, DELAYED RELEASE ORAL DAILY
Qty: 30 CAPSULE | Refills: 1 | Status: SHIPPED | OUTPATIENT
Start: 2022-01-12 | End: 2022-03-07 | Stop reason: SDUPTHER

## 2022-01-12 RX ORDER — DULOXETIN HYDROCHLORIDE 30 MG/1
CAPSULE, DELAYED RELEASE ORAL
Qty: 60 CAPSULE | Refills: 1 | Status: CANCELLED | OUTPATIENT
Start: 2022-01-12

## 2022-01-12 NOTE — TELEPHONE ENCOUNTER
King Lizabeth needs a new script of her Cymbalta sent over to the pharmacy  She is requesting 60mg instead of taking 2 30mg       Patient sees Eleni Scheuermann on 3/7

## 2022-01-17 RX ORDER — INSULIN PUMP CONTROLLER
EACH MISCELLANEOUS
COMMUNITY

## 2022-01-17 RX ORDER — NITROFURANTOIN 25; 75 MG/1; MG/1
100 CAPSULE ORAL
COMMUNITY
End: 2022-05-06

## 2022-01-18 ENCOUNTER — OFFICE VISIT (OUTPATIENT)
Dept: INTERNAL MEDICINE CLINIC | Facility: CLINIC | Age: 77
End: 2022-01-18
Payer: COMMERCIAL

## 2022-01-18 VITALS
SYSTOLIC BLOOD PRESSURE: 132 MMHG | WEIGHT: 195 LBS | DIASTOLIC BLOOD PRESSURE: 72 MMHG | RESPIRATION RATE: 16 BRPM | HEIGHT: 62 IN | HEART RATE: 86 BPM | BODY MASS INDEX: 35.88 KG/M2 | OXYGEN SATURATION: 98 %

## 2022-01-18 DIAGNOSIS — Z13.820 SCREENING FOR OSTEOPOROSIS: ICD-10-CM

## 2022-01-18 DIAGNOSIS — E78.5 HYPERLIPIDEMIA, UNSPECIFIED HYPERLIPIDEMIA TYPE: Chronic | ICD-10-CM

## 2022-01-18 DIAGNOSIS — M79.7 FIBROMYALGIA: Chronic | ICD-10-CM

## 2022-01-18 DIAGNOSIS — B02.29 POST HERPETIC NEURALGIA: ICD-10-CM

## 2022-01-18 DIAGNOSIS — M24.549 CONTRACTURE OF HAND: ICD-10-CM

## 2022-01-18 DIAGNOSIS — Z01.419 ENCOUNTER FOR GYNECOLOGICAL EXAMINATION WITHOUT ABNORMAL FINDING: ICD-10-CM

## 2022-01-18 DIAGNOSIS — M54.16 LUMBAR RADICULOPATHY: ICD-10-CM

## 2022-01-18 DIAGNOSIS — E11.42 DIABETIC POLYNEUROPATHY ASSOCIATED WITH TYPE 2 DIABETES MELLITUS (HCC): Primary | ICD-10-CM

## 2022-01-18 DIAGNOSIS — Z23 ENCOUNTER FOR IMMUNIZATION: ICD-10-CM

## 2022-01-18 DIAGNOSIS — Z12.31 ENCOUNTER FOR SCREENING MAMMOGRAM FOR BREAST CANCER: ICD-10-CM

## 2022-01-18 DIAGNOSIS — E66.01 CLASS 3 SEVERE OBESITY DUE TO EXCESS CALORIES WITHOUT SERIOUS COMORBIDITY IN ADULT, UNSPECIFIED BMI (HCC): ICD-10-CM

## 2022-01-18 DIAGNOSIS — I10 ESSENTIAL HYPERTENSION: Chronic | ICD-10-CM

## 2022-01-18 DIAGNOSIS — F33.2 MDD (MAJOR DEPRESSIVE DISORDER), RECURRENT SEVERE, WITHOUT PSYCHOSIS (HCC): ICD-10-CM

## 2022-01-18 PROCEDURE — 3075F SYST BP GE 130 - 139MM HG: CPT | Performed by: INTERNAL MEDICINE

## 2022-01-18 PROCEDURE — 1160F RVW MEDS BY RX/DR IN RCRD: CPT | Performed by: INTERNAL MEDICINE

## 2022-01-18 PROCEDURE — 3078F DIAST BP <80 MM HG: CPT | Performed by: INTERNAL MEDICINE

## 2022-01-18 PROCEDURE — 1036F TOBACCO NON-USER: CPT | Performed by: INTERNAL MEDICINE

## 2022-01-18 PROCEDURE — 4040F PNEUMOC VAC/ADMIN/RCVD: CPT | Performed by: INTERNAL MEDICINE

## 2022-01-18 PROCEDURE — G0009 ADMIN PNEUMOCOCCAL VACCINE: HCPCS

## 2022-01-18 PROCEDURE — 99214 OFFICE O/P EST MOD 30 MIN: CPT | Performed by: INTERNAL MEDICINE

## 2022-01-18 PROCEDURE — 90732 PPSV23 VACC 2 YRS+ SUBQ/IM: CPT

## 2022-01-18 RX ORDER — METHYLPREDNISOLONE 4 MG/1
TABLET ORAL
Qty: 21 EACH | Refills: 0 | Status: SHIPPED | OUTPATIENT
Start: 2022-01-18 | End: 2022-05-06

## 2022-01-18 NOTE — ASSESSMENT & PLAN NOTE
[facility] for Medrol Dosepak 1 as directed reviewed risks benefits and side effects must monitor blood sugar closely if blood sugar starts to over 200 please notify she is also mention this to her endocrinologist   Therapy recommend x-ray lumbar spine    Her symptoms not improve next 4-6 weeks will consider MRI of the lumbar spine

## 2022-01-18 NOTE — ASSESSMENT & PLAN NOTE
Lab Results   Component Value Date    HGBA1C 7 2 (H) 12/13/2021   I have counselled the pt to follow a healthy and balanced diet ,and recommend routine exercise  Target A1c under 7 I will be ordering diabetic laboratories including comprehensive metabolic panel, hemoglobin A1c, urine microalbumin, lipid panel    Continues to work with endocrinology

## 2022-01-18 NOTE — PROGRESS NOTES
Assessment/Plan:    Diabetic polyneuropathy associated with type 2 diabetes mellitus ([facility] )    Lab Results   Component Value Date    HGBA1C 7 2 (H) 12/13/2021   I have counselled the pt to follow a healthy and balanced diet ,and recommend routine exercise  Target A1c under 7 I will be ordering diabetic laboratories including comprehensive metabolic panel, hemoglobin A1c, urine microalbumin, lipid panel  Continues to work with endocrinology    Essential hypertension  Hypertension - controlled, I have counseled patient following healthy balance diet, I would like the patient reduce sodium, exercise routinely, I would like the patient continued the med current medical regiment and we will continue to monitor  Post herpetic neuralgia  Improving continue current medical regiment    Lumbar radiculopathy  Sciatica Rx for Medrol Dosepak 1 as directed reviewed risks benefits and side effects must monitor blood sugar closely if blood sugar starts to over 200 please notify she is also mention this to her endocrinologist   Therapy recommend x-ray lumbar spine  Her symptoms not improve next 4-6 weeks will consider MRI of the lumbar spine    Contracture of hand  See Orthopedics Dr Dr Thedora Spatz    Obesity  Obesity -I have counseled patient following healthy and balanced diet, I would like the patient to lose weight, I would like the patient exercise routinely; we will continue monitor the patient's progress  MDD (major depressive disorder), recurrent severe, without psychosis ([facility] )  Currently stable continue working with Psychiatry    Hyperlipidemia  Hyperlipidemia controlled continue with current medical regiment recommend a low-cholesterol diet and recommend routine exercise we will continue to monitor the progress           Problem List Items Addressed This Visit        Endocrine    Diabetic polyneuropathy associated with type 2 diabetes mellitus ([facility] ) - Primary       Lab Results   Component Value Date    HGBA1C 7 2 (H) 12/13/2021   I have counselled the pt to follow a healthy and balanced diet ,and recommend routine exercise  Target A1c under 7 I will be ordering diabetic laboratories including comprehensive metabolic panel, hemoglobin A1c, urine microalbumin, lipid panel  Continues to work with endocrinology         Relevant Orders    Comprehensive metabolic panel    Hemoglobin A1C    Lipid Panel with Direct LDL reflex       Cardiovascular and Mediastinum    Essential hypertension (Chronic)     Hypertension - controlled, I have counseled patient following healthy balance diet, I would like the patient reduce sodium, exercise routinely, I would like the patient continued the med current medical regiment and we will continue to monitor  Nervous and Auditory    Post herpetic neuralgia     Improving continue current medical regiment         Lumbar radiculopathy     Sciatica Rx for Medrol Dosepak 1 as directed reviewed risks benefits and side effects must monitor blood sugar closely if blood sugar starts to over 200 please notify she is also mention this to her endocrinologist   Therapy recommend x-ray lumbar spine  Her symptoms not improve next 4-6 weeks will consider MRI of the lumbar spine         Relevant Medications    methylPREDNISolone 4 MG tablet therapy pack    Other Relevant Orders    XR spine lumbar minimum 4 views non injury    Ambulatory Referral to Physical Therapy       Other    Hyperlipidemia (Chronic)     Hyperlipidemia controlled continue with current medical regiment recommend a low-cholesterol diet and recommend routine exercise we will continue to monitor the progress           Fibromyalgia (Chronic)    MDD (major depressive disorder), recurrent severe, without psychosis ([facility] )     Currently stable continue working with Psychiatry         Obesity     Obesity -I have counseled patient following healthy and balanced diet, I would like the patient to lose weight, I would like the patient exercise routinely; we will continue monitor the patient's progress  Encounter for immunization    Relevant Orders    PNEUMOCOCCAL POLYSACCHARIDE VACCINE 23-VALENT =>1YO SQ IM (Completed)    Contracture of hand     See Orthopedics Dr Dr Cady Tolbert Referral to Orthopedic Surgery      Other Visit Diagnoses     Encounter for screening mammogram for breast cancer        Relevant Orders    Mammo screening bilateral w 3d & cad    Encounter for gynecological examination without abnormal finding        Relevant Orders    Ambulatory Referral to Obstetrics / Gynecology    Screening for osteoporosis        Relevant Orders    DXA bone density spine hip and pelvis          RTO in 3-4 months call if any problems  Subjective:      Patient ID: Kiera Burton is a 77 y o  female  HPI 74-year old female coming in for a follow up office visit regarding post herpetic neuralgia, hypertension, type 2 diabetes, depression, sciatica and obesity; The patient reports me compliant taking medications without untoward side effects the  The patient is here to review his medical condition, update me on the medical condition and the patient reports me no hospitalizations and no ER visits  Patient does report me lower back pain with radiation down the left lower extremity no incontinence the pt reprots post herpetic neuralgia, the pt reports pain behing the left , hip no pain in the back pain worsened when sitting on a hard chair pain worsened with sitting to standing position reports and weakness left lower extremity difficulty getting the the couch week ago no for x2 she eventually gets up   She developed the sx for 6 weeks, "achey in nature" when she sits the sx get better  No incontinance  She has a history of known sciatica for may years ago had the 1st MRI in 5 reports me working with Dermatology having the skin tags removed when the scab they are painful because of post herpetic neuralgia also psoriasis of the scalp  She reports she cant lay right hand flat ont he table because    The following portions of the patient's history were reviewed and updated as appropriate: allergies, current medications, past family history, past medical history, past social history, past surgical history and problem list     Review of Systems   Constitutional: Negative for activity change, appetite change and unexpected weight change  Eyes: Negative for visual disturbance  Respiratory: Negative for cough and shortness of breath  Cardiovascular: Negative for chest pain  Gastrointestinal: Negative for abdominal pain, diarrhea, nausea and vomiting  Genitourinary: Negative for difficulty urinating  Musculoskeletal: Positive for back pain  Neurological: Negative for dizziness, light-headedness and headaches  Objective:    No follow-ups on file  No results found        Allergies   Allergen Reactions    Molds & Smuts Allergic Rhinitis    Other Allergic Rhinitis     RAGWEED, CAT DANDER, DOG DANDER     Pollen Extract Other (See Comments)     Cold symptoms         Past Medical History:   Diagnosis Date    Acute embolism and thrombosis of unspecified deep veins of unspecified lower extremity (HCC)     Last Assessed:  5/18/17    Anemia     Anosmia     Anxiety     Arthritis     Asthma     Back pain     Bilateral macular retinal edema     CAD (coronary artery disease)     Cataract     Cervical disc herniation     Cervical radiculopathy     Cervical spinal stenosis     Cervical spondylolysis     Chronic mastoiditis     Complex endometrial hyperplasia     Depression     Diabetes mellitus ([address] )     DVT (deep venous thrombosis) (HCC)     Fibromyalgia     Hyperlipidemia     Hypertension     Hypothyroid     Lumbar radiculopathy     Neck pain     Obese     Shingles 07/01/2021    Spinal stenosis     Stomach ulcer     Thyroid disease      Past Surgical History:   Procedure Laterality Date  BACK SURGERY      CARPAL TUNNEL RELEASE      CATARACT EXTRACTION      CHOLECYSTECTOMY      COLONOSCOPY      CORONARY ANGIOPLASTY      CORONARY ARTERY BYPASS GRAFT      CYSTOSCOPY N/A 6/20/2017    Procedure: Marcey Prader;  Surgeon: Didi Goff MD;  Location: [facility];  Service: Gynecology Oncology    SD LAPAROSCOPY W TOT HYSTERECTUTERUS <=250 [address]  W TUBE/OVARY N/A 6/20/2017    Procedure: ROBOTIC HYSTERECTOMY; BILATERAL SALPINO-OOPHERECTOMY; umbilical hernia repair ;  Surgeon: Didi Goff MD;  Location: [facility];  Service: Gynecology Oncology    TONSILECTOMY AND ADNOIDECTOMY      UMBILICAL HERNIA REPAIR       Current Outpatient Medications on File Prior to Visit   Medication Sig Dispense Refill    ALPRAZolam (XANAX) 0 5 mg tablet Take 1 tablet (0 5 mg total) by mouth 2 (two) times a day as needed for anxiety 60 tablet 1    aspirin 81 MG tablet Take 81 mg by mouth daily      B-D UF III MINI PEN NEEDLES 31G X 5 MM MISC       BAQSIMI TWO PACK 3 MG/DOSE POWD Use as directed   0    cholecalciferol (VITAMIN D3) 1,000 units tablet Take 2,000 Units by mouth daily      diclofenac sodium (VOLTAREN) 1 % Apply 2 g topically 4 (four) times a day 1 Tube 0    diltiazem (TIAZAC) 300 MG 24 hr capsule Take 300 mg by mouth daily      docusate sodium (COLACE) 100 mg capsule Take 1 capsule (100 mg total) by mouth 2 (two) times a day 20 capsule 0    DULoxetine (CYMBALTA) 60 mg delayed release capsule Take 1 capsule (60 mg total) by mouth daily 30 capsule 1    enalapril (VASOTEC) 20 mg tablet Take 20 mg by mouth daily      insulin aspart (NovoLOG) 100 units/mL injection Inject 12 Units under the skin 3 (three) times a day before meals (Patient taking differently: Inject under the skin 3 (three) times a day before meals )  0    Insulin Disposable Pump (OmniPod Dash 5 Pack Pods) MISC USE 1 POD EVERY 1 5 DAYS      Insulin Disposable Pump (OmniPod Dash 5 Pack Pods) MISC Omnipod Dash Insulin Pod subcutaneous cartridge   USE 1 POD EVERY 1 5 DAYS      Insulin Disposable Pump (OMNIPOD DASH 5 PACK) MISC CHANGE PODS EVERY 2 DAYS UTD  3    isosorbide mononitrate (IMDUR) 60 mg 24 hr tablet TAKE 1 5 TABs AT NIGHT      levothyroxine 50 mcg tablet Take 50 mcg by mouth daily      naloxone (NARCAN) 4 mg/0 1 mL nasal spray Administer 1 spray into a nostril  If no response after 2-3 minutes, give another dose in the other nostril using a new spray  1 each 1    nitroglycerin (NITROLINGUAL) 0 4 mg/spray spray USE ONE SPRAY Q 5 MINUTES AS NEEDED FOR CHEST PAIN  IF NO RELIEF, CALL 911   1    rosuvastatin (CRESTOR) 20 MG tablet Take 20 mg by mouth every evening    2    albuterol (2 5 mg/3 mL) 0 083 % nebulizer solution Take 3 mL (2 5 mg total) by nebulization every 6 (six) hours as needed for wheezing (Patient not taking: Reported on 7/15/2021) 75 mL 0    albuterol (PROAIR HFA) 90 mcg/act inhaler Inhale 2 puffs every 6 (six) hours as needed  (Patient not taking: Reported on 7/15/2021)      betamethasone dipropionate 0 05 % lotion  (Patient not taking: Reported on 7/15/2021)      fluocinonide (LIDEX) 0 05 % external solution  (Patient not taking: Reported on 12/22/2021 )      nitrofurantoin (MACROBID) 100 mg capsule 100 mg (Patient not taking: Reported on 1/18/2022 )      traZODone (DESYREL) 50 mg tablet TAKE 0 5-1 TABLETS (25-50 MG TOTAL) BY MOUTH DAILY AT BEDTIME AS NEEDED FOR SLEEP (Patient not taking: Reported on 1/18/2022 ) 90 tablet 1    valACYclovir (VALTREX) 1,000 mg tablet Take 1 tablet (1,000 mg total) by mouth 2 (two) times a day for 7 days 14 tablet 0     No current facility-administered medications on file prior to visit       Family History   Problem Relation Age of Onset    Arthritis Mother     Leukemia Mother     Other Mother         Anxiety, major depressive disorder, recurrent episode with atypical features    Coronary artery disease Father         Heart problem    Diabetes Father     Other Father         Infectious disease    Alzheimer's disease Maternal Grandmother     Other Maternal Grandfather         Heart problem    Other Daughter         Anxiety, major depressive disorder, recurrent episode with atypical features    Alcohol abuse Other         Grandparent    Cancer Family     Diabetes Family     Hypertension Family     Other Family         Reported prior back trouble, thyroid disorder     Social History     Socioeconomic History    Marital status: /Civil Union     Spouse name: Not on file    Number of children: 2    Years of education: High school or GED    Highest education level: Not on file   Occupational History    Occupation: Retired   Tobacco Use    Smoking status: Former Smoker     Packs/day: 1 00     Years: 6 00     Pack years: 6 00     Types: Cigarettes     Quit date:      Years since quittin 0    Smokeless tobacco: Never Used    Tobacco comment: Smoked about a half a pack for about 4 yrs  Quite about 50 yrs ago     Vaping Use    Vaping Use: Never used   Substance and Sexual Activity    Alcohol use: No    Drug use: No    Sexual activity: Never     Comment: No known STD risk factors   Other Topics Concern    Not on file   Social History Narrative    Lives independently with spouse    No known risk factors    Denied:  Exercising regularly     Social Determinants of Health     Financial Resource Strain: Not on file   Food Insecurity: Not on file   Transportation Needs: Not on file   Physical Activity: Not on file   Stress: Not on file   Social Connections: Not on file   Intimate Partner Violence: Not on file   Housing Stability: Not on file     Vitals:    22 0837   BP: 132/72   Pulse: 86   Resp: 16   SpO2: 98%   Weight: 88 5 kg (195 lb)   Height: 5' 1 5" (1 562 m)     Results for orders placed or performed in visit on 21   POCT urine dip   Result Value Ref Range    LEUKOCYTE ESTERASE,UA 2+     NITRITE,UA neg     SL AMB POCT UROBILINOGEN 3 5 POCT URINE PROTEIN pos      PH,UA 6 0     BLOOD,UA 1+     SPECIFIC GRAVITY,UA 1 020     KETONES,UA neg     BILIRUBIN,UA neg     GLUCOSE, UA neg      COLOR,UA yellow     CLARITY,UA clear      Weight (last 2 days)     Date/Time Weight    01/18/22 0837 88 5 (195)        Body mass index is 36 25 kg/m²  BP      Temp      Pulse     Resp      SpO2        Vitals:    01/18/22 0837   Weight: 88 5 kg (195 lb)     Vitals:    01/18/22 0837   Weight: 88 5 kg (195 lb)       /72   Pulse 86   Resp 16   Ht 5' 1 5" (1 562 m)   Wt 88 5 kg (195 lb)   SpO2 98%   BMI 36 25 kg/m²          Physical Exam  Constitutional:       Appearance: She is well-developed  HENT:      Head: Normocephalic  Eyes:      General: No scleral icterus  Right eye: No discharge  Left eye: No discharge  Conjunctiva/sclera: Conjunctivae normal       Pupils: Pupils are equal, round, and reactive to light  Cardiovascular:      Rate and Rhythm: Normal rate and regular rhythm  Heart sounds: Normal heart sounds  No murmur heard  No friction rub  No gallop  Pulmonary:      Effort: No respiratory distress  Breath sounds: Normal breath sounds  No wheezing or rales  Abdominal:      General: Bowel sounds are normal  There is no distension  Palpations: Abdomen is soft  There is no mass  Tenderness: There is no abdominal tenderness  There is no guarding or rebound  Musculoskeletal:         General: No deformity  Cervical back: Neck supple  Lymphadenopathy:      Cervical: No cervical adenopathy  Neurological:      Mental Status: She is alert        Coordination: Coordination normal        straight leg raising 60° left leg right leg 70° motor strength 5/5 DTRs 2/4 bilateral sensation intact

## 2022-01-20 ENCOUNTER — TELEPHONE (OUTPATIENT)
Dept: OBGYN CLINIC | Facility: HOSPITAL | Age: 77
End: 2022-01-20

## 2022-01-20 NOTE — TELEPHONE ENCOUNTER
Email also sent to [facility]:  Hello,  Please advise if the following patient can be forced onto the schedule:  Patient:  Junaid Santo  :  [birth year]  MRN:  [id number]  Call back #  Reason for appointment: I called patient from referral & she would like an appt for her rt hand 3rd 4th & 5th fingers trigger  Requested doctor/location:  [facility] or [place]    Thank you in advance!

## 2022-02-01 ENCOUNTER — OFFICE VISIT (OUTPATIENT)
Dept: OBGYN CLINIC | Facility: CLINIC | Age: 77
End: 2022-02-01
Payer: COMMERCIAL

## 2022-02-01 VITALS
BODY MASS INDEX: 35.88 KG/M2 | HEART RATE: 80 BPM | WEIGHT: 195 LBS | DIASTOLIC BLOOD PRESSURE: 73 MMHG | HEIGHT: 62 IN | SYSTOLIC BLOOD PRESSURE: 128 MMHG

## 2022-02-01 DIAGNOSIS — M65.351 TRIGGER LITTLE FINGER OF RIGHT HAND: Primary | ICD-10-CM

## 2022-02-01 DIAGNOSIS — M65.341 TRIGGER RING FINGER OF RIGHT HAND: ICD-10-CM

## 2022-02-01 DIAGNOSIS — M24.549 CONTRACTURE OF HAND: ICD-10-CM

## 2022-02-01 PROCEDURE — 3074F SYST BP LT 130 MM HG: CPT | Performed by: PHYSICIAN ASSISTANT

## 2022-02-01 PROCEDURE — 3078F DIAST BP <80 MM HG: CPT | Performed by: PHYSICIAN ASSISTANT

## 2022-02-01 PROCEDURE — 20550 NJX 1 TENDON SHEATH/LIGAMENT: CPT | Performed by: PHYSICIAN ASSISTANT

## 2022-02-01 PROCEDURE — 1036F TOBACCO NON-USER: CPT | Performed by: PHYSICIAN ASSISTANT

## 2022-02-01 PROCEDURE — 1160F RVW MEDS BY RX/DR IN RCRD: CPT | Performed by: PHYSICIAN ASSISTANT

## 2022-02-01 PROCEDURE — 99214 OFFICE O/P EST MOD 30 MIN: CPT | Performed by: PHYSICIAN ASSISTANT

## 2022-02-01 RX ORDER — DEXAMETHASONE SODIUM PHOSPHATE 100 MG/10ML
40 INJECTION INTRAMUSCULAR; INTRAVENOUS
Status: COMPLETED | OUTPATIENT
Start: 2022-02-01 | End: 2022-02-01

## 2022-02-01 RX ORDER — LIDOCAINE HYDROCHLORIDE 10 MG/ML
1 INJECTION, SOLUTION INFILTRATION; PERINEURAL
Status: COMPLETED | OUTPATIENT
Start: 2022-02-01 | End: 2022-02-01

## 2022-02-01 RX ADMIN — LIDOCAINE HYDROCHLORIDE 1 ML: 10 INJECTION, SOLUTION INFILTRATION; PERINEURAL at 11:32

## 2022-02-01 RX ADMIN — DEXAMETHASONE SODIUM PHOSPHATE 40 MG: 100 INJECTION INTRAMUSCULAR; INTRAVENOUS at 11:32

## 2022-02-01 NOTE — PROGRESS NOTES
CHIEF COMPLAINT:  Chief Complaint   Patient presents with    Right Hand - Pain, Locking       SUBJECTIVE:  Peterson Morales is a 77y o  year old RHD female who presents right ring and small trigger finger  Patient has been noting locking symptoms for since July 2021  They have not tried any treatments in the past   They deny any numbness or tingling  They deny any constitutional signs or symptoms  They have not tried any medications for this issue         PAST MEDICAL HISTORY:  Past Medical History:   Diagnosis Date    Acute embolism and thrombosis of unspecified deep veins of unspecified lower extremity (HCC)     Last Assessed:  5/18/17    Anemia     Anosmia     Anxiety     Arthritis     Asthma     Back pain     Bilateral macular retinal edema     CAD (coronary artery disease)     Cataract     Cervical disc herniation     Cervical radiculopathy     Cervical spinal stenosis     Cervical spondylolysis     Chronic mastoiditis     Complex endometrial hyperplasia     Depression     Diabetes mellitus (HCC)     DVT (deep venous thrombosis) (HCC)     Fibromyalgia     Hyperlipidemia     Hypertension     Hypothyroid     Lumbar radiculopathy     Neck pain     Obese     Shingles 07/01/2021    Spinal stenosis     Stomach ulcer     Thyroid disease        PAST SURGICAL HISTORY:  Past Surgical History:   Procedure Laterality Date    BACK SURGERY      CARPAL TUNNEL RELEASE      CATARACT EXTRACTION      CHOLECYSTECTOMY      COLONOSCOPY      CORONARY ANGIOPLASTY      CORONARY ARTERY BYPASS GRAFT      CYSTOSCOPY N/A 6/20/2017    Procedure: CYSTOSCOPY;  Surgeon: Med Goldman MD;  [facility];  Service: Gynecology Oncology    CT LAPAROSCOPY W TOT HYSTERECTUTERUS <=250 GRAM  W TUBE/OVARY N/A 6/20/2017    Procedure: ROBOTIC HYSTERECTOMY; BILATERAL SALPINO-OOPHERECTOMY; umbilical hernia repair ;  Surgeon: Med Goldman MD;  [facility];  Service: Gynecology Oncology  TONSILECTOMY AND ADNOIDECTOMY      UMBILICAL HERNIA REPAIR         FAMILY HISTORY:  Family History   Problem Relation Age of Onset    Arthritis Mother     Leukemia Mother     Other Mother         Anxiety, major depressive disorder, recurrent episode with atypical features    Coronary artery disease Father         Heart problem    Diabetes Father     Other Father         Infectious disease    Alzheimer's disease Maternal Grandmother     Other Maternal Grandfather         Heart problem    Other Daughter         Anxiety, major depressive disorder, recurrent episode with atypical features    Alcohol abuse Other         Grandparent    Cancer Family     Diabetes Family     Hypertension Family     Other Family         Reported prior back trouble, thyroid disorder       SOCIAL HISTORY:  Social History     Tobacco Use    Smoking status: Former Smoker     Packs/day: 1 00     Years: 6      Pack years: 6 00     Types: Cigarettes     Quit date:      Years since quittin 1    Smokeless tobacco: Never Used    Tobacco comment: Smoked about a half a pack for about 4 yrs  Quite about 50 yrs ago     Vaping Use    Vaping Use: Never used   Substance Use Topics    Alcohol use: No    Drug use: No       MEDICATIONS:    Current Outpatient Medications:     albuterol (2 5 mg/3 mL) 0 083 % nebulizer solution, Take 3 mL (2 5 mg total) by nebulization every 6 (six) hours as needed for wheezing (Patient not taking: Reported on 7/15/2021), Disp: 75 mL, Rfl: 0    albuterol (PROAIR HFA) 90 mcg/act inhaler, Inhale 2 puffs every 6 (six) hours as needed  (Patient not taking: Reported on 7/15/2021), Disp: , Rfl:     ALPRAZolam (XANAX) 0 5 mg tablet, Take 1 tablet (0 5 mg total) by mouth 2 (two) times a day as needed for anxiety, Disp: 60 tablet, Rfl: 1    aspirin 81 MG tablet, Take 81 mg by mouth daily, Disp: , Rfl:     B-D UF III MINI PEN NEEDLES 31G X 5 MM MISC, , Disp: , Rfl:     BAQSIMI TWO PACK 3 MG/DOSE POWD, Use as directed , Disp: , Rfl: 0    betamethasone dipropionate 0 05 % lotion, , Disp: , Rfl:     cholecalciferol (VITAMIN D3) 1,000 units tablet, Take 2,000 Units by mouth daily, Disp: , Rfl:     diclofenac sodium (VOLTAREN) 1 %, Apply 2 g topically 4 (four) times a day, Disp: 1 Tube, Rfl: 0    diltiazem (TIAZAC) 300 MG 24 hr capsule, Take 300 mg by mouth daily, Disp: , Rfl:     docusate sodium (COLACE) 100 mg capsule, Take 1 capsule (100 mg total) by mouth 2 (two) times a day, Disp: 20 capsule, Rfl: 0    DULoxetine (CYMBALTA) 60 mg delayed release capsule, Take 1 capsule (60 mg total) by mouth daily, Disp: 30 capsule, Rfl: 1    enalapril (VASOTEC) 20 mg tablet, Take 20 mg by mouth daily, Disp: , Rfl:     fluocinonide (LIDEX) 0 05 % external solution, , Disp: , Rfl:     insulin aspart (NovoLOG) 100 units/mL injection, Inject 12 Units under the skin 3 (three) times a day before meals (Patient taking differently: Inject under the skin 3 (three) times a day before meals ), Disp: , Rfl: 0    Insulin Disposable Pump (OmniPod Dash 5 Pack Pods) MISC, USE 1 POD EVERY 1 5 DAYS, Disp: , Rfl:     Insulin Disposable Pump (OmniPod Dash 5 Pack Pods) MISC, Omnipod Dash Insulin Pod subcutaneous cartridge  USE 1 POD EVERY 1 5 DAYS, Disp: , Rfl:     Insulin Disposable Pump (OMNIPOD DASH 5 PACK) MISC, CHANGE PODS EVERY 2 DAYS UTD, Disp: , Rfl: 3    isosorbide mononitrate (IMDUR) 60 mg 24 hr tablet, TAKE 1 5 TABs AT NIGHT, Disp: , Rfl:     levothyroxine 50 mcg tablet, Take 50 mcg by mouth daily, Disp: , Rfl:     methylPREDNISolone 4 MG tablet therapy pack, Use as directed on package, Disp: 21 each, Rfl: 0    naloxone (NARCAN) 4 mg/0 1 mL nasal spray, Administer 1 spray into a nostril   If no response after 2-3 minutes, give another dose in the other nostril using a new spray , Disp: 1 each, Rfl: 1    nitrofurantoin (MACROBID) 100 mg capsule, 100 mg (Patient not taking: Reported on 1/18/2022 ), Disp: , Rfl:    nitroglycerin (NITROLINGUAL) 0 4 mg/spray spray, USE ONE SPRAY Q 5 MINUTES AS NEEDED FOR CHEST PAIN   IF NO RELIEF, CALL 911 , Disp: , Rfl: 1    rosuvastatin (CRESTOR) 20 MG tablet, Take 20 mg by mouth every evening  , Disp: , Rfl: 2    traZODone (DESYREL) 50 mg tablet, TAKE 0 5-1 TABLETS (25-50 MG TOTAL) BY MOUTH DAILY AT BEDTIME AS NEEDED FOR SLEEP (Patient not taking: Reported on 1/18/2022 ), Disp: 90 tablet, Rfl: 1    valACYclovir (VALTREX) 1,000 mg tablet, Take 1 tablet (1,000 mg total) by mouth 2 (two) times a day for 7 days, Disp: 14 tablet, Rfl: 0    ALLERGIES:  Allergies   Allergen Reactions    Molds & Smuts Allergic Rhinitis    Other Allergic Rhinitis     RAGWEED, CAT DANDER, DOG DANDER     Pollen Extract Other (See Comments)     Cold symptoms         REVIEW OF SYSTEMS:  Review of Systems  ROS:   General: no fever, no chills  HEENT:  No loss of hearing or eyesight problems  Eyes:  No red eyes  Respiratory:  No coughing, shortness of breath or wheezing  Cardiovascular:  No chest pain, no palpitations  GI:  Abdomen soft nontender, denies nausea  Endocrine:  No muscle weakness, no frequent urination, no excessive thirst  Urinary:  No dysuria, no incontinence  Musculoskeletal: see HPI and PE  SKIN:  No skin rash, no dry skin  Neurological:  No headaches, no confusion  Psychiatric:  No suicide thoughts, no anxiety, no depression  Review of all other systems is negative    VITALS:  Vitals:    02/01/22 1107   BP: 128/73   Pulse: 80       LABS:  HgA1c:   Lab Results   Component Value Date    HGBA1C 7 2 (H) 12/13/2021     BMP:   Lab Results   Component Value Date    GLUCOSE 248 (H) 11/09/2015    CALCIUM 9 6 12/13/2021     11/16/2017    K 4 7 12/13/2021    CO2 26 12/13/2021     12/13/2021    BUN 25 12/13/2021    CREATININE 1 40 (H) 12/13/2021       _____________________________________________________  PHYSICAL EXAMINATION:  General: well developed and well nourished, alert, oriented times 3 and appears comfortable  Psychiatric: Normal  HEENT: Trachea Midline, No torticollis  Pulmonary: No audible wheezing or respiratory distress   Skin: No masses, erythema, lacerations, fluctation, ulcerations  Neurovascular: Sensation Intact to the Median, Ulnar, Radial Nerve, Motor Intact to the Median, Ulnar, Radial Nerve and Pulses Intact    MUSCULOSKELETAL EXAMINATION:  right ring and small finger:  Positive palpable nodule over the A1 pulley  Positive tenderness to palpation over A1 pulley  Positive clicking  Negative catching      ___________________________________________________  STUDIES REVIEWED:  No studies reviewed  PROCEDURES PERFORMED:  Hand/upper extremity injection: R ring A1  Universal Protocol:  Consent: Verbal consent obtained  Risks and benefits: risks, benefits and alternatives were discussed  Consent given by: patient  Time out: Immediately prior to procedure a "time out" was called to verify the correct patient, procedure, equipment, support staff and site/side marked as required  Patient understanding: patient states understanding of the procedure being performed  Patient consent: the patient's understanding of the procedure matches consent given  Site marked: the operative site was marked  Patient identity confirmed: verbally with patient    Supporting Documentation  Indications: pain   Procedure Details  Condition:trigger finger Location: ring finger - R ring A1   Preparation: Patient was prepped and draped in the usual sterile fashion  Needle size: 25 G  Approach: volar  Medications administered: 1 mL lidocaine 1 %; 40 mg dexamethasone 100 mg/10 mL    Patient tolerance: patient tolerated the procedure well with no immediate complications  Dressing:  Sterile dressing applied     Hand/upper extremity injection: R small A1  Universal Protocol:  Consent: Verbal consent obtained    Risks and benefits: risks, benefits and alternatives were discussed  Consent given by: patient  Time out: Immediately prior to procedure a "time out" was called to verify the correct patient, procedure, equipment, support staff and site/side marked as required  Patient understanding: patient states understanding of the procedure being performed  Patient consent: the patient's understanding of the procedure matches consent given  Site marked: the operative site was marked  Patient identity confirmed: verbally with patient    Supporting Documentation  Indications: pain   Procedure Details  Condition:trigger finger Location: small finger - R small A1   Preparation: Patient was prepped and draped in the usual sterile fashion  Needle size: 25 G  Approach: volar  Medications administered: 1 mL lidocaine 1 %; 40 mg dexamethasone 100 mg/10 mL    Patient tolerance: patient tolerated the procedure well with no immediate complications  Dressing:  Sterile dressing applied              _____________________________________________________  ASSESSMENT/PLAN:      Right ring and small Trigger finger  *Pt was offered and accepted CSI   *CSI was administered into the right ring and small finger A1 pully without complications  *Pt was advised to apply heat for any residual discomfort or clicking and locking  *Pt was advised that only 2 CSI could be administered for this condition, and after we would have to consider trigger release if they are still having pain, clicking, locking  *Pt will follow up in the office in 6 weeks    * Patient has an elevated A1c  Patient was counseled on the effects of cortisone injections and how they can elevate blood sugars  It was recommended that they watch their blood sugars over the next 48-72 hours to ensure they are not significantly elevated  Patient was given signs and symptoms of hyperglycemia    Patient was advised when to report to the emergency department if symptoms arise and when taking the blood sugar they are significantly elevated compared to normal   Patient understands this counseling  Follow Up:  Return in about 6 weeks (around 3/15/2022) for Dr Madeline Mathew  To Do Next Visit:  Re-evaluation of current issue    General Discussions:  Trigger Finger: The anatomy and physiology of trigger finger was discussed with the patient today in the office  Edema and increased contact pressure within the flexor tendons at the A1 pulley can cause pain, crepitation, and triggering or locking of the digit resulting in limitation of function  Symptoms can occur at anytime but are typically worse in the morning or after a brief rest from repetitive activity  Treatment options include resting/nighttime MP blocking splints to decrease edema, oral anti-inflammatory medications, home or formal therapy exercises, up to 2 steroid injections within the tendon sheath, or surgical release  While majority of patients do respond to conservative treatment, up to 20% may require surgical release  Operative Discussions:  Trigger Finger Release: The anatomy and physiology of trigger finger was discussed with the patient today in the office  Edema and increased contact pressure within the flexor tendons at the A1 pulley can cause pain, crepitation, and limitation of function  Treatment options include resting MP blocking splints to decrease edema, oral anti-inflammatory medications, home or formal therapy exercises, up to 2 steroid injections or surgical release  While majority of patients do respond to conservative treatment, up to 20% may require surgical release  The patient has elected release of the trigger finger  The patient has elected to undergo a release of the A1 pulley (trigger finger)  A small incision will be made over the palmar aspect of the hand, the tendon sheath holding the flexor tendons will be released  In the postoperative period, light activities are allowed immediately, driving is allowed when narcotic medication has stopped, and the incision may get wet after 2 days  Heavy activities (lifting more than approximately 10 pounds) will be allowed after the follow up appointment in 1-2 weeks  While the pain and discomfort within the wrist typically improves rapidly, some residual discomfort may be present for up to 6 weeks  The nodule that is typically palpable in the palmar aspect of the hand will not be removed, as this would necessitate removal of a portion of the flexor tendon, however the catching, clicking, and locking should resolve  Approximate success rate is 98%  The risks and benefits of the procedure were explained to the patient, which include, but are not limited to: Bleeding, infection, recurrence, pain, scar, damage to tendons, damage to nerves, and damage to blood vessels, need for future surgery and complications related to anesthesia  If bony work is done, risks also include malunion and nonunion  These risks, along with alternative conservative treatment options, and postoperative protocols were voiced back and understood by the patient  All questions were answered to the patient's satisfaction  The patient agrees to comply with a standard postoperative protocol, and is willing to proceed  Education was provided via written and auditory forms  There were no barriers to learning  Written handouts regarding wound care, incision and scar care, and general preoperative information, as well as risks and benefits were provided to the patient  Portions of the record may have been created with voice recognition software  Occasional wrong word or "sound a like" substitutions may have occurred due to the inherent limitations of voice recognition software  Read the chart carefully and recognize, using context, where substitutions have occurred

## 2022-02-14 DIAGNOSIS — F41.1 GAD (GENERALIZED ANXIETY DISORDER): ICD-10-CM

## 2022-02-14 DIAGNOSIS — F41.0 PANIC ATTACKS: ICD-10-CM

## 2022-02-14 DIAGNOSIS — F41.9 ANXIETY: ICD-10-CM

## 2022-02-14 RX ORDER — ALPRAZOLAM 0.5 MG/1
0.5 TABLET ORAL 2 TIMES DAILY PRN
Qty: 60 TABLET | Refills: 1 | Status: SHIPPED | OUTPATIENT
Start: 2022-02-14 | End: 2022-03-07 | Stop reason: SDUPTHER

## 2022-02-14 NOTE — TELEPHONE ENCOUNTER
William Bhatia is requesting a refill on their Xanax   Patient's next appointment with Ilya Ibrahim is on 3/7

## 2022-02-25 ENCOUNTER — TELEPHONE (OUTPATIENT)
Dept: NEPHROLOGY | Facility: CLINIC | Age: 77
End: 2022-02-25

## 2022-03-01 ENCOUNTER — SOCIAL WORK (OUTPATIENT)
Dept: BEHAVIORAL/MENTAL HEALTH CLINIC | Facility: CLINIC | Age: 77
End: 2022-03-01
Payer: COMMERCIAL

## 2022-03-01 DIAGNOSIS — F41.1 GAD (GENERALIZED ANXIETY DISORDER): Primary | ICD-10-CM

## 2022-03-01 PROCEDURE — 90834 PSYTX W PT 45 MINUTES: CPT | Performed by: SOCIAL WORKER

## 2022-03-01 NOTE — PSYCH
Psychotherapy Provided: Individual Psychotherapy 55 minutes from 4:05-5:00    Length of time in session: 55 minutes, follow up in 5 week    Encounter Diagnosis     ICD-10-CM    1  MADAN (generalized anxiety disorder)  F41 1        Goals addressed in session: Goal 1     Pain:      moderate to severe    6    Current suicide risk : Low - having much pain with her knees and her hand issue  Contracts for safety due to family    Macey Caballero dealing with an increase in her stress related to her pain issues but also due to ongoing issues with her daughter  She has been managing the best she can  Looking forward to attending some concerts in the future and continues to cultivate time and interactions with grandchildren which is always beneficial  Frustrated with her health issues, financial issues and lack of independence achieved by her daughter  Despite this, her mood has been largely stable  Lori Mcneal presents as mildly anxious  She is pleasant, verbal, cooperative, well oriented and engaged during session  Thoughts are logical and goal directed  She is appropaitely dressed and groomed  P- processed stress ors with Jade Lopez and their influence on her mood- validation and supportive therapy provided  Reviewed appropaite coping strategies and productive outlets to utilize to offset stress  Behavioral Health Treatment Plan [facility]: Diagnosis and Treatment Plan explained to Maricela Mcardle relates understanding diagnosis and is agreeable to Treatment Plan   Yes

## 2022-03-02 ENCOUNTER — PROCEDURE VISIT (OUTPATIENT)
Dept: OBGYN CLINIC | Facility: HOSPITAL | Age: 77
End: 2022-03-02
Payer: COMMERCIAL

## 2022-03-02 VITALS
HEIGHT: 62 IN | HEART RATE: 65 BPM | BODY MASS INDEX: 37.36 KG/M2 | DIASTOLIC BLOOD PRESSURE: 75 MMHG | WEIGHT: 203 LBS | SYSTOLIC BLOOD PRESSURE: 132 MMHG

## 2022-03-02 DIAGNOSIS — M25.561 CHRONIC PAIN OF BOTH KNEES: ICD-10-CM

## 2022-03-02 DIAGNOSIS — M17.0 BILATERAL PRIMARY OSTEOARTHRITIS OF KNEE: Primary | ICD-10-CM

## 2022-03-02 DIAGNOSIS — G89.29 CHRONIC PAIN OF BOTH KNEES: ICD-10-CM

## 2022-03-02 DIAGNOSIS — M25.562 CHRONIC PAIN OF BOTH KNEES: ICD-10-CM

## 2022-03-02 PROCEDURE — 20610 DRAIN/INJ JOINT/BURSA W/O US: CPT | Performed by: ORTHOPAEDIC SURGERY

## 2022-03-02 RX ORDER — HYALURONATE SODIUM 10 MG/ML
20 SYRINGE (ML) INTRAARTICULAR
Status: COMPLETED | OUTPATIENT
Start: 2022-03-02 | End: 2022-03-02

## 2022-03-02 RX ADMIN — Medication 20 MG: at 16:13

## 2022-03-02 NOTE — PROGRESS NOTES
Subjective;    80-year-old female patient approved for Euflexxa brand product of [employer] supplementation to both knees  Patient presents today to receive injection 1     During the history patient forms me that she sees pain management  She is struggling with weakness of her legs that predictably occurs when she is standing forward flexed and impacts her ability to stand and distance  She has a past history of left lower extremity sciatica or radiculopathy  He has a past history of lumbar spine surgery  He is known to have lumbar spinal stenosis and neurogenic claudication  Her spine surgery occurred in 1985 and she has no current spinal surgeon following her      Past Medical History:   Diagnosis Date    Acute embolism and thrombosis of unspecified deep veins of unspecified lower extremity (HCC)     Last Assessed:  5/18/17    Anemia     Anosmia     Anxiety     Arthritis     Asthma     Back pain     Bilateral macular retinal edema     CAD (coronary artery disease)     Cataract     Cervical disc herniation     Cervical radiculopathy     Cervical spinal stenosis     Cervical spondylolysis     Chronic mastoiditis     Complex endometrial hyperplasia     Depression     Diabetes mellitus (HCC)     DVT (deep venous thrombosis) (HCC)     Fibromyalgia     Hyperlipidemia     Hypertension     Hypothyroid     Lumbar radiculopathy     Neck pain     Obese     Shingles 07/01/2021    Spinal stenosis     Stomach ulcer     Thyroid disease        Past Surgical History:   Procedure Laterality Date    BACK SURGERY      CARPAL TUNNEL RELEASE      CATARACT EXTRACTION      CHOLECYSTECTOMY      COLONOSCOPY      CORONARY ANGIOPLASTY      CORONARY ARTERY BYPASS GRAFT      CYSTOSCOPY N/A 6/20/2017    Procedure: CYSTOSCOPY;  Surgeon: Nia Quezada MD;  [facility];  Service: Gynecology Oncology    SC LAPAROSCOPY W TOT HYSTERECTUTERUS <=250 GRAM  W TUBE/OVARY N/A 6/20/2017    Procedure: ROBOTIC HYSTERECTOMY; BILATERAL SALPINO-OOPHERECTOMY; umbilical hernia repair ;  Surgeon: Didi Goff MD;  [facility];  Service: Gynecology Oncology    TONSILECTOMY AND ADNOIDECTOMY      UMBILICAL HERNIA REPAIR         Family History   Problem Relation Age of Onset    Arthritis Mother     Leukemia Mother     Other Mother         Anxiety, major depressive disorder, recurrent episode with atypical features    Coronary artery disease Father         Heart problem    Diabetes Father     Other Father         Infectious disease    Alzheimer's disease Maternal Grandmother     Other Maternal Grandfather         Heart problem    Other Daughter         Anxiety, major depressive disorder, recurrent episode with atypical features    Alcohol abuse Other         Grandparent    Cancer Family     Diabetes Family     Hypertension Family     Other Family         Reported prior back trouble, thyroid disorder       Social History     Tobacco Use    Smoking status: Former Smoker     Packs/day:      Years:      Pack years:      Types: Cigarettes     Quit date:      Years since quittin 1    Smokeless tobacco: Never Used    Tobacco comment: Smoked about a half a pack for about 4 yrs  Quite about 50 yrs ago  Vaping Use    Vaping Use: Never used   Substance Use Topics    Alcohol use: No    Drug use: No     Exam;    Adult female in no acute distress  She has intact motor exam to hip flexors knee extensors TA EHL and peroneals bilateral  Has diminished DTRs at the knee and at the ankle hyper reflexive right side compared to normal reflexive left leg  Sensation is maintained a soft sharp touch through the L2-L3 L4 and L5 dermatomes  Her knees allow for safe administration of medication today  There is no bruising of the knees no redness no palpable warmth      Large joint arthrocentesis  Procedure Details  Location: knee -   Needle size: 22 G (RP 27 G)  Medications administered: 20 mg Sodium Hyaluronate 20 MG/2ML  Specialty Pharmacy Supplied: received medications from pharmacy  Patient tolerance: patient tolerated the procedure well with no immediate complications  Dressing:  Sterile dressing applied    Large joint arthrocentesis: L knee  Procedure Details  Location: knee - L knee  Needle size: 22 G (RP 27 G)  Ultrasound guidance: no  Medications administered: 20 mg Sodium Hyaluronate 20 MG/2ML  Specialty Pharmacy Supplied: received medications from pharmacy  Patient tolerance: patient tolerated the procedure well with no immediate complications  Dressing:  Sterile dressing applied      Impression;    Bilateral knee osteoarthritis  Bilateral knee pain  History of lumbar spine surgery  Leg weakness predictably with standing and time  Plan;    Euflexxa injection 1  Was administered from a lateral parapatellar tendon approach to both knees    Her most recent lumbar MRI is almost 3years old ,  And considered obsolete  He may review her concern and her weakness with pain management and Dr Rubi Novoa  He may wish to pursue a surgical consult  She will most likely need diagnostic imaging which can be directed by them  Of all, she is to avoid being a fall risk, and this was discussed  Her experience was supervised by and plan formulated by the attending surgeon it was my privilege to assist him in the delivery of her care

## 2022-03-04 ENCOUNTER — OFFICE VISIT (OUTPATIENT)
Dept: OBGYN CLINIC | Facility: CLINIC | Age: 77
End: 2022-03-04
Payer: COMMERCIAL

## 2022-03-04 VITALS
WEIGHT: 202.2 LBS | SYSTOLIC BLOOD PRESSURE: 140 MMHG | BODY MASS INDEX: 39.7 KG/M2 | HEIGHT: 60 IN | DIASTOLIC BLOOD PRESSURE: 64 MMHG

## 2022-03-04 DIAGNOSIS — Z01.419 ENCNTR FOR GYN EXAM (GENERAL) (ROUTINE) W/O ABN FINDINGS: Primary | ICD-10-CM

## 2022-03-04 DIAGNOSIS — Z12.11 COLON CANCER SCREENING: ICD-10-CM

## 2022-03-04 DIAGNOSIS — Z12.31 ENCOUNTER FOR SCREENING MAMMOGRAM FOR MALIGNANT NEOPLASM OF BREAST: ICD-10-CM

## 2022-03-04 DIAGNOSIS — Z01.419 ENCOUNTER FOR GYNECOLOGICAL EXAMINATION WITHOUT ABNORMAL FINDING: ICD-10-CM

## 2022-03-04 DIAGNOSIS — L40.50 PSORIASIS WITH ARTHROPATHY (HCC): ICD-10-CM

## 2022-03-04 PROBLEM — K63.5 COLON POLYPS: Status: ACTIVE | Noted: 2017-05-04

## 2022-03-04 PROBLEM — S81.811A LACERATION OF RIGHT LOWER LEG: Status: RESOLVED | Noted: 2020-08-24 | Resolved: 2022-03-04

## 2022-03-04 PROBLEM — Z11.59 NEED FOR HEPATITIS C SCREENING TEST: Status: RESOLVED | Noted: 2021-02-24 | Resolved: 2022-03-04

## 2022-03-04 PROBLEM — R91.1 LUNG NODULE: Status: ACTIVE | Noted: 2017-04-25
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
## 2022-03-04 PROBLEM — N30.00 ACUTE CYSTITIS WITHOUT HEMATURIA: Status: RESOLVED | Noted: 2021-12-22 | Resolved: 2022-03-04

## 2022-03-04 PROBLEM — Z95.1 S/P CABG (CORONARY ARTERY BYPASS GRAFT): Status: ACTIVE | Noted: 2021-08-23

## 2022-03-04 PROBLEM — Z11.59 ENCOUNTER FOR HEPATITIS C SCREENING TEST FOR LOW RISK PATIENT: Status: RESOLVED | Noted: 2018-12-05 | Resolved: 2022-03-04

## 2022-03-04 PROBLEM — Z95.5 PRESENCE OF STENT IN CORONARY ARTERY: Status: ACTIVE | Noted: 2021-08-23

## 2022-03-04 PROBLEM — N85.01 COMPLEX ENDOMETRIAL HYPERPLASIA: Status: ACTIVE | Noted: 2017-05-18

## 2022-03-04 PROBLEM — Z00.00 MEDICARE ANNUAL WELLNESS VISIT, SUBSEQUENT: Status: RESOLVED | Noted: 2018-12-09 | Resolved: 2022-03-04

## 2022-03-04 PROBLEM — I82.409 DVT (DEEP VENOUS THROMBOSIS) (HCC): Status: ACTIVE | Noted: 2017-06-16

## 2022-03-04 PROBLEM — Z23 ENCOUNTER FOR IMMUNIZATION: Status: RESOLVED | Noted: 2021-02-24 | Resolved: 2022-03-04

## 2022-03-04 PROBLEM — J06.9 UPPER RESPIRATORY TRACT INFECTION: Status: RESOLVED | Noted: 2019-06-09 | Resolved: 2022-03-04

## 2022-03-04 PROBLEM — E78.2 MIXED HYPERLIPIDEMIA: Status: ACTIVE | Noted: 2021-04-06

## 2022-03-04 PROBLEM — E03.9 HYPOTHYROID: Chronic | Status: RESOLVED | Noted: 2017-04-19 | Resolved: 2022-03-04

## 2022-03-04 PROCEDURE — S0610 ANNUAL GYNECOLOGICAL EXAMINA: HCPCS | Performed by: PHYSICIAN ASSISTANT

## 2022-03-04 NOTE — PROGRESS NOTES
Alicja Echeverria   1945    CC:  Yearly exam    S:  77 y o  female here for yearly exam  She is s/p robotic hysterectomy and BSO in 2017 by Dr Mitzi Quispe for complex endometrial hyperplasia without atypia; pathology revealed the same  She denies vaginal bleeding  She denies vaginal discharge, itching, odor or dryness  She is not sexually active with her  due to his ED  She is struggling with spinal stenosis, leg issues, and post-herpetic neuralgia from zoster back in July  She just had both knees injected with a gel yesterday       Last Mammo 2017 (ordered by PCP)   Last Colonoscopy 2017 - polyps - recommended 3 year recall (did not go)   Last DEXA never (ordered by PCP)    Family hx of breast cancer: no  Family hx of ovarian cancer: no  Family hx of colon cancer: no      Current Outpatient Medications:     albuterol (2 5 mg/3 mL) 0 083 % nebulizer solution, Take 3 mL (2 5 mg total) by nebulization every 6 (six) hours as needed for wheezing, Disp: 75 mL, Rfl: 0    albuterol (PROAIR HFA) 90 mcg/act inhaler, Inhale 2 puffs every 6 (six) hours as needed  , Disp: , Rfl:     ALPRAZolam (XANAX) 0 5 mg tablet, Take 1 tablet (0 5 mg total) by mouth 2 (two) times a day as needed for anxiety, Disp: 60 tablet, Rfl: 1    aspirin 81 MG tablet, Take 81 mg by mouth daily, Disp: , Rfl:     B-D UF III MINI PEN NEEDLES 31G X 5 MM MISC, , Disp: , Rfl:     BAQSIMI TWO PACK 3 MG/DOSE POWD, Use as directed , Disp: , Rfl: 0    betamethasone dipropionate 0 05 % lotion,  , Disp: , Rfl:     cholecalciferol (VITAMIN D3) 1,000 units tablet, Take 2,000 Units by mouth daily, Disp: , Rfl:     diclofenac sodium (VOLTAREN) 1 %, Apply 2 g topically 4 (four) times a day, Disp: 1 Tube, Rfl: 0    diltiazem (TIAZAC) 300 MG 24 hr capsule, Take 300 mg by mouth daily, Disp: , Rfl:     docusate sodium (COLACE) 100 mg capsule, Take 1 capsule (100 mg total) by mouth 2 (two) times a day, Disp: 20 capsule, Rfl: 0   enalapril (VASOTEC) 20 mg tablet, Take 20 mg by mouth daily, Disp: , Rfl:     fluocinonide (LIDEX) 0 05 % external solution,  , Disp: , Rfl:     insulin aspart (NovoLOG) 100 units/mL injection, Inject 12 Units under the skin 3 (three) times a day before meals (Patient taking differently: Inject under the skin 3 (three) times a day before meals ), Disp: , Rfl: 0    Insulin Disposable Pump (OmniPod Dash 5 Pack Pods) MISC, USE 1 POD EVERY 1 5 DAYS, Disp: , Rfl:     Insulin Disposable Pump (OmniPod Dash 5 Pack Pods) MISC, Omnipod Dash Insulin Pod subcutaneous cartridge  USE 1 POD EVERY 1 5 DAYS, Disp: , Rfl:     Insulin Disposable Pump (OMNIPOD DASH 5 PACK) MISC, CHANGE PODS EVERY 2 DAYS UTD, Disp: , Rfl: 3    isosorbide mononitrate (IMDUR) 60 mg 24 hr tablet, TAKE 1 5 TABs AT NIGHT, Disp: , Rfl:     levothyroxine 50 mcg tablet, Take 50 mcg by mouth daily, Disp: , Rfl:     methylPREDNISolone 4 MG tablet therapy pack, Use as directed on package, Disp: 21 each, Rfl: 0    naloxone (NARCAN) 4 mg/0 1 mL nasal spray, Administer 1 spray into a nostril  If no response after 2-3 minutes, give another dose in the other nostril using a new spray , Disp: 1 each, Rfl: 1    nitrofurantoin (MACROBID) 100 mg capsule, 100 mg  , Disp: , Rfl:     nitroglycerin (NITROLINGUAL) 0 4 mg/spray spray, USE ONE SPRAY Q 5 MINUTES AS NEEDED FOR CHEST PAIN   IF NO RELIEF, CALL 911 , Disp: , Rfl: 1    rosuvastatin (CRESTOR) 20 MG tablet, Take 20 mg by mouth every evening  , Disp: , Rfl: 2    traZODone (DESYREL) 50 mg tablet, TAKE 0 5-1 TABLETS (25-50 MG TOTAL) BY MOUTH DAILY AT BEDTIME AS NEEDED FOR SLEEP, Disp: 90 tablet, Rfl: 1    DULoxetine (CYMBALTA) 60 mg delayed release capsule, Take 1 capsule (60 mg total) by mouth daily, Disp: 30 capsule, Rfl: 1    valACYclovir (VALTREX) 1,000 mg tablet, Take 1 tablet (1,000 mg total) by mouth 2 (two) times a day for 7 days, Disp: 14 tablet, Rfl: 0  Social History     Socioeconomic History    Marital status: /Civil Union     Spouse name: Not on file    Number of children: 2    Years of education: High school or GED    Highest education level: Not on file   Occupational History    Occupation: Retired   Tobacco Use    Smoking status: Former Smoker     Packs/day: 1 00     Years: 6 00     Pack years: 6 00     Types: Cigarettes     Quit date:      Years since quittin 1    Smokeless tobacco: Never Used    Tobacco comment: Smoked about a half a pack for about 4 yrs  Quite about 50 yrs ago     Vaping Use    Vaping Use: Never used   Substance and Sexual Activity    Alcohol use: No    Drug use: No    Sexual activity: Not Currently     Comment: No known STD risk factors   Other Topics Concern    Not on file   Social History Narrative    Lives independently with spouse    No known risk factors    Denied:  Exercising regularly     Social Determinants of Health     Financial Resource Strain: Not on file   Food Insecurity: Not on file   Transportation Needs: Not on file   Physical Activity: Not on file   Stress: Not on file   Social Connections: Not on file   Intimate Partner Violence: Not on file   Housing Stability: Not on file     Family History   Problem Relation Age of Onset    Arthritis Mother     Leukemia Mother     Other Mother         Anxiety, major depressive disorder, recurrent episode with atypical features    Coronary artery disease Father         Heart problem    Diabetes Father     Other Father         Infectious disease    Alzheimer's disease Maternal Grandmother     Other Maternal Grandfather         Heart problem    Other Daughter         Anxiety, major depressive disorder, recurrent episode with atypical features    Alcohol abuse Other         Grandparent    Cancer Family     Diabetes Family     Hypertension Family     Other Family         Reported prior back trouble, thyroid disorder     Past Medical History:   Diagnosis Date    Acute embolism and thrombosis of unspecified deep veins of unspecified lower extremity (HCC)     Last Assessed:  5/18/17    Anemia     Anosmia     Anxiety     Arthritis     Asthma     Back pain     Bilateral macular retinal edema     CAD (coronary artery disease)     Cataract     Cervical disc herniation     Cervical radiculopathy     Cervical spinal stenosis     Cervical spondylolysis     Chronic mastoiditis     Complex endometrial hyperplasia     Depression     Diabetes mellitus ([facility] [address] )     DVT (deep venous thrombosis) (HCC)     Fibromyalgia     Hyperlipidemia     Hypertension     Hypothyroid     Lumbar radiculopathy     Neck pain     Obese     Shingles 07/01/2021    Spinal stenosis     Stomach ulcer     Thyroid disease       Allergies   Allergen Reactions    Molds & Smuts Allergic Rhinitis    Other Allergic Rhinitis     RAGWEED, CAT DANDER, DOG DANDER     Pollen Extract Other (See Comments)     Cold symptoms         Review of Systems   Respiratory: Negative  Cardiovascular: Negative  Gastrointestinal: Negative for constipation and diarrhea  Genitourinary: Negative for difficulty urinating, pelvic pain, vaginal bleeding, vaginal discharge, itching, or odor  O:  Blood pressure 140/64, height 5' 0 24" (1 53 m), weight 91 7 kg (202 lb 3 2 oz)  Patient appears well and is not in distress  Neck is supple without masses  Breasts are symmetrical without mass, tenderness, nipple discharge, skin changes or adenopathy  Abdomen is soft and nontender without masses  External genitals are normal without lesions or rashes  Urethral meatus and urethra are normal  Vagina is normal without discharge or bleeding  Cervix and uterus are surgically absent  Adnexa have no masses, nontender     A:   Yearly exam      P:   Mammo and DEXA slips reprinted for pt   Colonoscopy due, referral to Dr Renae  entered     RTO one year for yearly exam or sooner as needed

## 2022-03-05 NOTE — PSYCH
MEDICATION MANAGEMENT NOTE        [facility]      Name and Date of Birth: Maren Gu 77 y o  1945    Date of Visit: March 7, 2022    SUBJECTIVE:  CC: Joseph Mckinney presents today for follow up on depression and anxiety , "fine as far as that goes (mental health)"  Joseph Mckinney has sciatic pain, and knee arthritis pain worse  R hand pain as well  Having injections  Worse pain today is 7/10  "I having a rather good day today"  Likes cymbalta it is helping  Continues to see nephrologist      Lingering sensitivity/pain on back from shingles    Taking xanax 0 5mg BID    Daughter struggles, 5 jobs in last several months, struggles after her divorce, she has kids but they are 'too involved in their own lives'  "I am her savior'  Does not have insurance for therapy  She has 2 jobs right now, she is overweight, so limited options  Daughter is almost 48  F/U PRN- PHP was ok, but when she left PHP "I got into the same old rut"  [place] concerns from marriage when they were younger  She brought this up to  (he felt bad about, but not a lot of movement to help her 'in the now' related to their relationship and her mental health  Since our last visit, overall symptoms have been unchanged        Med Compliance: yes    HPI ROS:               ('was' notes: prior visit)  Medication Side Effects:  no     Depression (10 worst):  low, 'somewhere inside I suffer from it but I don't pay attention to it' (Was 9 because of the situation, pain, and low energy, worry about finances and costs of all her recent care)   Anxiety (10 worst):  8 ("I really think it is the pain") (Was 8)   Hallucinations or Psychosis  no (Was no)    Safety concerns (SI, HI, etc):  no (Was no)   Sleep: (NM = Nightmares)  7hr, not needing trazodone (only PRN), but does take naps occasionally (Was good with trazodone, 7hr)   Energy:  low end, but she feels a lot due to mobility/pain (Was low, maybe slightly better)   Appetite:  'too good' (Was slightly improved)   Weight Change:  some gain      PHQ-2/9 Depression Screening               Stoney Smith denies any side effects from medications unless noted above    Review Of Systems as noted above  Otherwise A comprehensive review of systems was negative  History Review: The following portions of the patient's history were reviewed and documented: allergies, current medications, past family history, past medical history, past social history and problem list      Lab Review: Labs were reviewed and discussed with patient      OBJECTIVE:     MENTAL STATUS EXAM  Appearance:  age appropriate   Behavior:  pleasant, cooperative, with good eye contact   Speech:  Normal volume, regular rate and rhythm   Mood:  dysphoric, anxious   Affect:  mood congruent, constricted   Language: intact and appropriate for age, education, and intellect   Thought Process:  Linear and goal directed   Associations: intact associations   Thought Content:  normal and appropriate   Perceptual Disturbances: no auditory or visual hallcunations   Risk Potential / Abnormal Thoughts: Suicidal ideation - None  Homicidal ideation - None  Potential for aggression - No       Consciousness:  Alert & Awake   Sensorium:  Grossly oriented   Attention: attention span and concentration are age appropriate       Fund of Knowledge:  Memory: awareness of current events: yes  recent and remote memory grossly intact   Insight:  good   Judgment: good   Muscle Strength Muscle Tone: normal  normal   Gait/Station: uses cane   Motor Activity: no abnormal movements       Risks, Benefits And Possible Side Effects Of Medications:    AGREE: Risks, benefits, and possible side effects of medications explained to Stoney Smith and she (or legal representative) verbalizes understanding and agreement for treatment      Controlled Medication Discussion:     Biancaar Smith has been filling controlled prescriptions on time as prescribed according to [facility] program    _____________________________________________________________      Recent labs:  No visits with results within 1 Month(s) from this visit  Latest known visit with results is:   Office Visit on 12/22/2021   Component Date Value    LEUKOCYTE ESTERASE,UA 12/22/2021 2+     NITRITE,UA 12/22/2021 neg     SL AMB POCT UROBILINOGEN 12/22/2021 3 5     POCT URINE PROTEIN 12/22/2021 pos      PH,UA 12/22/2021 6 0     BLOOD,UA 12/22/2021 1+     SPECIFIC GRAVITY,UA 12/22/2021 1 020     KETONES,UA 12/22/2021 neg     BILIRUBIN,UA 12/22/2021 neg     GLUCOSE, UA 12/22/2021 neg      COLOR,UA 12/22/2021 yellow     CLARITY,UA 12/22/2021 clear          Psychiatric History  Previous diagnoses include depression, anxiety  Prior outpatient psychiatric treatment: no  Prior therapy: yes  Prior inpatient psychiatric treatment: no  PHP 2021 (some benefit, but waned)  Prior suicide attempts: no  Prior self harm: no  Prior violence or aggression: no     Social History:  The patient grew up in [facility] [place]  Childhood was described as "happy"      During childhood, parents were  Together  Only child     Abuse/neglect: none     As far as the patient (or present family member) is aware, overall childhood development: Patient does ascribe to normal developmental milestones such as walking, talking, potty training and making childhood friends      Education level: some college   Current occupation: h/o    Retired     Catholic Affiliatio: Anabaptism   experience: no  Legal history: no  Access to Guns:  has a 22 rifle       Substance use and treatment:  Tobacco use: former  Caffeine Use: limited  ETOH use: no  Other substance use: no     Endorses previous experimentation with: no     Longest clean time: not applicable  History of Inpatient/Outpatient rehabilitation program: no        Traumatic History:      Abuse: none  Other Traumatic Events: none    Family Psychiatric History:      Psychiatric Illness:      Anxiety & Depression - mother, daughter  Substance Abuse:       no family history of substance abuse  Suicide Attempts:        no family history of suicide attempts     Denies bipolar disorder       Family History   Problem Relation Age of Onset    Arthritis Mother     Leukemia Mother     Other Mother         Anxiety, major depressive disorder, recurrent episode with atypical features    Coronary artery disease Father         Heart problem    Diabetes Father     Other Father         Infectious disease    Alzheimer's disease Maternal Grandmother     Other Maternal Grandfather         Heart problem    Other Daughter         Anxiety, major depressive disorder, recurrent episode with atypical features    Alcohol abuse Other         Grandparent    Cancer Family     Diabetes Family     Hypertension Family     Other Family         Reported prior back trouble, thyroid disorder         Medical / Surgical History:    Past Medical History:   Diagnosis Date    Acute embolism and thrombosis of unspecified deep veins of unspecified lower extremity (HCC)     Last Assessed:  5/18/17    Anemia     Anosmia     Anxiety     Arthritis     Asthma     Back pain     Bilateral macular retinal edema     CAD (coronary artery disease)     Cataract     Cervical disc herniation     Cervical radiculopathy     Cervical spinal stenosis     Cervical spondylolysis     Chronic mastoiditis     Complex endometrial hyperplasia     Depression     Diabetes mellitus ([address] )     DVT (deep venous thrombosis) ([address] )     Fibromyalgia     Hyperlipidemia     Hypertension     Hypothyroid     Lumbar radiculopathy     Neck pain     Obese     Shingles 07/01/2021    Spinal stenosis     Stomach ulcer     Thyroid disease      Past Surgical History:   Procedure Laterality Date    BACK SURGERY      CARPAL TUNNEL RELEASE      CATARACT EXTRACTION      CHOLECYSTECTOMY  COLONOSCOPY      CORONARY ANGIOPLASTY      CORONARY ARTERY BYPASS GRAFT      CYSTOSCOPY N/A 6/20/2017    [facility];  Surgeon: Roderick López MD;  Location: [facility];  Service: Gynecology Oncology    IL LAPAROSCOPY W TOT HYSTERECTUTERUS <=250 GRAM  W TUBE/OVARY N/A 6/20/2017    Procedure: ROBOTIC HYSTERECTOMY; BILATERAL SALPINO-OOPHERECTOMY; umbilical hernia repair ;  Surgeon: Roderick López MD;  Location: [facility];  Service: Gynecology Oncology    TONSILECTOMY AND ADNOIDECTOMY      UMBILICAL HERNIA REPAIR         Assessment/Plan:        Diagnoses and all orders for this visit:    MADAN (generalized anxiety disorder)    MDD (major depressive disorder), recurrent severe, without psychosis ([facility] [address] )        ______________________________________________________________________  MDD  MADAN  Panic Attacks (R/O Disorder)     MDD- improving  MADAN- not at goal  Panic Attacks - xanax manages adequately    Soham Hoover did well in [address] before but benefits waned  Likes where medications are presently    Fetzima did not help, she felt cymbalta better, "The first day I took it I felt like myself"  She has strong conviction for and belief in cymbalta  Trazodone helping  Discussed SGA  Xanax helps some, 'not a great difference' but prefers it  Gabapentin she is avoidant of due to history     We had discussed [facility], she was not interested (revisit PRN)    CrCl ~30s     From a biological perspective, her Kidney function is declining  She has SARAY (cannot tolerate CPAP or treatment), and other medical issues       Suicide / Homicide / Safety risk assessment: see above  safety risk low overall upon consideration of protective and risk factors  Additional Assessment:    Previous psychotropic medication trials:   cymbalta since ~2018  Tried 90mg but GI upset too much  buspar - 3 days, but stopped xanax at same time  Crying   Likely not from medication - 8/31/20 to 12/2020 took 10mg BID, then stopped on own again due to stating it was causing crying spells  Zoloft- 100mg for a few years, no side effects, not sure if helped  Valium - helped  lexapro (ordered, but did not really try because fibromyalgia worse when stopping cymbalta)   Gabapentin years ago, briefly - felt groggy on it, fell asleep behind the wheel; 'feels loopy' in past (in 8/2021 did not have issue, but very low dose, chose to stop)    Scales:         Treatment Plan:        Patient has been educated about their diagnosis and treatment modalities  They voiced understanding and agreement with the following plan:    1) MEDS:         Discussed medications and if treatment adjustment was needed/desired  - Xanax 0 5mg BID PRN (Dr Heydi Cornejo prescribed last) - I TOOK OVER 8/13/2021   - Cymbalta 60mg daily (11/2021)   - Trazodone 50mg HS PRN insomnia (infrequent)      2) Labs: PRN    - ECG 4/6/21: QTcH 441, sinus bradycardia with minor NST (Scanned ~8/26/21)    3) Therapy:    - Shaq Arnold   - Referred to therapy - she will also look on own (I printed some names off Psychology today)   - Prior Visit referred to [facility] (only wants face to face)    4) Medical:    - Pt will f/u with other providers as needed     5) Follow up:   - Follow up in 2mo    - Patient will call if issues or concerns      6) Treatment Plan:    - Enacted 4/23/2020, 8/31/2020, 1/13/2020, 8/26/2021, 3/7/2022      Discussed self monitoring of symptoms, and symptom monitoring tools  Patient has been informed of 24 hours and weekend coverage for urgent situations accessed by calling the main clinic phone number               Psychotherapy in session:  Time spent performing psychotherapy: 16 minutes supportive therapy related to daughter, relationship with , physical health frustrations and life limitations, healthy boundaries

## 2022-03-05 NOTE — PSYCH
This note was not shared with the patient due to this is a psychotherapy note       Shonna Linn can be very indecisive, and self sabotaging inadvertently        5) Other: Support as needed   - ok relationship with , up and down   - mom's death in facility hard on her (6/2020)   - daughter struggling, has 3 kids total   - Children: 3

## 2022-03-07 ENCOUNTER — OFFICE VISIT (OUTPATIENT)
Dept: PSYCHIATRY | Facility: CLINIC | Age: 77
End: 2022-03-07
Payer: COMMERCIAL

## 2022-03-07 DIAGNOSIS — F41.1 GAD (GENERALIZED ANXIETY DISORDER): ICD-10-CM

## 2022-03-07 DIAGNOSIS — F33.2 MDD (MAJOR DEPRESSIVE DISORDER), RECURRENT SEVERE, WITHOUT PSYCHOSIS (HCC): ICD-10-CM

## 2022-03-07 DIAGNOSIS — F41.9 ANXIETY: ICD-10-CM

## 2022-03-07 DIAGNOSIS — F41.0 PANIC ATTACKS: ICD-10-CM

## 2022-03-07 PROCEDURE — 99213 OFFICE O/P EST LOW 20 MIN: CPT | Performed by: PSYCHIATRY & NEUROLOGY

## 2022-03-07 RX ORDER — DULOXETIN HYDROCHLORIDE 60 MG/1
60 CAPSULE, DELAYED RELEASE ORAL DAILY
Qty: 30 CAPSULE | Refills: 1 | Status: SHIPPED | OUTPATIENT
Start: 2022-03-07 | End: 2022-03-07

## 2022-03-07 RX ORDER — ALPRAZOLAM 0.5 MG/1
0.5 TABLET ORAL 2 TIMES DAILY PRN
Qty: 60 TABLET | Refills: 1 | Status: SHIPPED | OUTPATIENT
Start: 2022-03-07 | End: 2022-05-26 | Stop reason: SDUPTHER

## 2022-03-07 RX ORDER — DULOXETIN HYDROCHLORIDE 60 MG/1
CAPSULE, DELAYED RELEASE ORAL
Qty: 90 CAPSULE | Refills: 1 | Status: SHIPPED | OUTPATIENT
Start: 2022-03-07

## 2022-03-07 NOTE — BH TREATMENT PLAN
TREATMENT PLAN (Medication Management Only)        [facility]    Name/Date of Birth/MRN:  Maria Esther Muir 77 y o  1945 MRN: [id number]  Date of Treatment Plan: March 7, 2022  Diagnosis/Diagnoses:   1  MADAN (generalized anxiety disorder)    2  MDD (major depressive disorder), recurrent severe, without psychosis ([facility] [address] )      Strengths/Personal Resources for Self-Care: , determined, caring  Area/Areas of need (in own words): family struggles, finances  1  Long Term Goal: "to not be the person everyone goes to"   Target Date: 180 days from treatment plan  Person/Persons responsible for completion of goal: Dr Fernando Tabares and Self  2  Short Term Objective (s) - How will we reach this goal?:   A  Provider new recommended medication/dosage changes and/or continue medication(s): as noted in chart  B   Continue to work with Kenny Mclaughlin I in therapy  C  Maintain healthy boundaries with daughter, and support her all the same  Target Date: 6 months from treatment plan unless noted otherwise  Person/Persons Responsible for Completion of Goal: Dr Fernando Tabares and Self   Progress Towards Goals: starting treatment   Treatment Modality: Medication management and therapy PRN  Review due 180 days from date of this plan: Approximately 6 months from today ( 9/7/2022 )    Expected length of service: ongoing treatment unless revised  My Physician/PA/NP and I have developed this plan together and I agree to work on the goals and objectives  I understand the treatment goals that were developed for my treatment    Signature:       Date and time:  Signature of parent/guardian if under age of 15 years: Date and time:  Signature of provider:      Date and time:  Signature of Supervising Physician:    Date and time: 3/7/2022      Antoinette Phoenix III, DO

## 2022-03-09 ENCOUNTER — PROCEDURE VISIT (OUTPATIENT)
Dept: OBGYN CLINIC | Facility: HOSPITAL | Age: 77
End: 2022-03-09
Payer: COMMERCIAL

## 2022-03-09 VITALS
BODY MASS INDEX: 39.18 KG/M2 | HEIGHT: 60 IN | HEART RATE: 71 BPM | DIASTOLIC BLOOD PRESSURE: 73 MMHG | SYSTOLIC BLOOD PRESSURE: 134 MMHG

## 2022-03-09 DIAGNOSIS — M25.561 CHRONIC PAIN OF BOTH KNEES: ICD-10-CM

## 2022-03-09 DIAGNOSIS — M25.562 CHRONIC PAIN OF BOTH KNEES: ICD-10-CM

## 2022-03-09 DIAGNOSIS — G89.29 CHRONIC PAIN OF BOTH KNEES: ICD-10-CM

## 2022-03-09 DIAGNOSIS — M17.0 BILATERAL PRIMARY OSTEOARTHRITIS OF KNEE: Primary | ICD-10-CM

## 2022-03-09 PROCEDURE — 20610 DRAIN/INJ JOINT/BURSA W/O US: CPT | Performed by: ORTHOPAEDIC SURGERY

## 2022-03-09 RX ORDER — LIDOCAINE HYDROCHLORIDE 10 MG/ML
4 INJECTION, SOLUTION INFILTRATION; PERINEURAL
Status: COMPLETED | OUTPATIENT
Start: 2022-03-09 | End: 2022-03-09

## 2022-03-09 RX ORDER — HYALURONATE SODIUM 10 MG/ML
20 SYRINGE (ML) INTRAARTICULAR
Status: COMPLETED | OUTPATIENT
Start: 2022-03-09 | End: 2022-03-09

## 2022-03-09 RX ADMIN — Medication 20 MG: at 15:57

## 2022-03-09 RX ADMIN — LIDOCAINE HYDROCHLORIDE 4 ML: 10 INJECTION, SOLUTION INFILTRATION; PERINEURAL at 15:57

## 2022-03-09 NOTE — PROGRESS NOTES
Assessment:   Diagnosis ICD-10-CM Associated Orders   1  Bilateral primary osteoarthritis of knee  M17 0 Large joint arthrocentesis: bilateral knee   2  Chronic pain of both knees  M25 561 Large joint arthrocentesis: bilateral knee    M25 562     G89 29        Plan:  Bilateral knees known osteoarthritis  Yajaira's knees were injected with the 2nd of 3 Euflexxa injections today  She tolerated both injections well (RP)  Ice and post injection protocol advised  Weightbearing activities as tolerated  She will follow up next week for the conclusion of the 3 shot series  To do next visit:  Return in about 1 week (around 3/16/2022) for re-check and Euflexxa #3 B/L knees  The above stated was discussed in layman's terms and the patient expressed understanding  All questions were answered to the patient's satisfaction  Scribe Attestation    I,:  Gagandeep Mi am acting as a scribe while in the presence of the attending physician :       I,:  Frances Ray MD personally performed the services described in this documentation    as scribed in my presence :             Subjective: Dalton Bernstein is a 77 y o  female who presents today for repeat evaluation of her bilateral knees, known osteoarthritis  She presents today for the 2nd of 3 Euflexxa injections  She tolerated last week's initial injections rather well  She uses single-point cane for ambulatory assistance        Review of systems negative unless otherwise specified in HPI  Review of Systems    Past Medical History:   Diagnosis Date    Acute embolism and thrombosis of unspecified deep veins of unspecified lower extremity (HCC)     Last Assessed:  5/18/17    Anemia     Anosmia     Anxiety     Arthritis     Asthma     Back pain     Bilateral macular retinal edema     CAD (coronary artery disease)     Cataract     Cervical disc herniation     Cervical radiculopathy     Cervical spinal stenosis     Cervical spondylolysis     Chronic mastoiditis     Complex endometrial hyperplasia     Depression     Diabetes mellitus (HCC)     DVT (deep venous thrombosis) (HCC)     Fibromyalgia     Hyperlipidemia     Hypertension     Hypothyroid     Lumbar radiculopathy     Neck pain     Obese     Shingles 07/01/2021    Spinal stenosis     Stomach ulcer     Thyroid disease        Past Surgical History:   Procedure Laterality Date    BACK SURGERY      CARPAL TUNNEL RELEASE      CATARACT EXTRACTION      CHOLECYSTECTOMY      COLONOSCOPY      CORONARY ANGIOPLASTY      CORONARY ARTERY BYPASS GRAFT      CYSTOSCOPY N/A 6/20/2017    Procedure: Lu Lime;  Surgeon: Harvey Willis MD;  [facility];  Service: Gynecology Oncology    MD LAPAROSCOPY W TOT HYSTERECTUTERUS <=250 GRAM  W TUBE/OVARY N/A 6/20/2017    Procedure: ROBOTIC HYSTERECTOMY; BILATERAL SALPINO-OOPHERECTOMY; umbilical hernia repair ;  Surgeon: Harvey Willis MD;  [facility];  Service: Gynecology Oncology    TONSILECTOMY AND ADNOIDECTOMY      UMBILICAL HERNIA REPAIR         Family History   Problem Relation Age of Onset    Arthritis Mother     Leukemia Mother     Other Mother         Anxiety, major depressive disorder, recurrent episode with atypical features    Coronary artery disease Father         Heart problem    Diabetes Father     Other Father         Infectious disease    Alzheimer's disease Maternal Grandmother     Other Maternal Grandfather         Heart problem    Other Daughter         Anxiety, major depressive disorder, recurrent episode with atypical features    Alcohol abuse Other         Grandparent    Cancer Family     Diabetes Family     Hypertension Family     Other Family         Reported prior back trouble, thyroid disorder       Social History     Occupational History    Occupation: Retired   Tobacco Use    Smoking status: Former Smoker     Packs/day: 1 00     Years: 6 00     Pack years: 6 00     Types: Cigarettes     Quit date: 47     Years since quittin 2    Smokeless tobacco: Never Used    Tobacco comment: Smoked about a half a pack for about 4 yrs  Quite about 50 yrs ago     Vaping Use    Vaping Use: Never used   Substance and Sexual Activity    Alcohol use: No    Drug use: No    Sexual activity: Not Currently     Comment: No known STD risk factors         Current Outpatient Medications:     albuterol (2 5 mg/3 mL) 0 083 % nebulizer solution, Take 3 mL (2 5 mg total) by nebulization every 6 (six) hours as needed for wheezing, Disp: 75 mL, Rfl: 0    albuterol (PROAIR HFA) 90 mcg/act inhaler, Inhale 2 puffs every 6 (six) hours as needed  , Disp: , Rfl:     ALPRAZolam (XANAX) 0 5 mg tablet, Take 1 tablet (0 5 mg total) by mouth 2 (two) times a day as needed for anxiety, Disp: 60 tablet, Rfl: 1    aspirin 81 MG tablet, Take 81 mg by mouth daily, Disp: , Rfl:     B-D UF III MINI PEN NEEDLES 31G X 5 MM MISC, , Disp: , Rfl:     BAQSIMI TWO PACK 3 MG/DOSE POWD, Use as directed , Disp: , Rfl: 0    betamethasone dipropionate 0 05 % lotion,  , Disp: , Rfl:     cholecalciferol (VITAMIN D3) 1,000 units tablet, Take 2,000 Units by mouth daily, Disp: , Rfl:     diclofenac sodium (VOLTAREN) 1 %, Apply 2 g topically 4 (four) times a day, Disp: 1 Tube, Rfl: 0    diltiazem (TIAZAC) 300 MG 24 hr capsule, Take 300 mg by mouth daily, Disp: , Rfl:     docusate sodium (COLACE) 100 mg capsule, Take 1 capsule (100 mg total) by mouth 2 (two) times a day, Disp: 20 capsule, Rfl: 0    DULoxetine (CYMBALTA) 60 mg delayed release capsule, TAKE 1 CAPSULE BY MOUTH EVERY DAY, Disp: 90 capsule, Rfl: 1    enalapril (VASOTEC) 20 mg tablet, Take 20 mg by mouth daily, Disp: , Rfl:     fluocinonide (LIDEX) 0 05 % external solution,  , Disp: , Rfl:     insulin aspart (NovoLOG) 100 units/mL injection, Inject 12 Units under the skin 3 (three) times a day before meals (Patient taking differently: Inject under the skin 3 (three) times a day before meals ), Disp: , Rfl: 0    Insulin Disposable Pump (OmniPod Dash 5 Pack Pods) MISC, USE 1 POD EVERY 1 5 DAYS, Disp: , Rfl:     Insulin Disposable Pump (OmniPod Dash 5 Pack Pods) MISC, Omnipod Dash Insulin Pod subcutaneous cartridge  USE 1 POD EVERY 1 5 DAYS, Disp: , Rfl:     Insulin Disposable Pump (OMNIPOD DASH 5 PACK) MISC, CHANGE PODS EVERY 2 DAYS UTD, Disp: , Rfl: 3    isosorbide mononitrate (IMDUR) 60 mg 24 hr tablet, TAKE 1 5 TABs AT NIGHT, Disp: , Rfl:     levothyroxine 50 mcg tablet, Take 50 mcg by mouth daily, Disp: , Rfl:     methylPREDNISolone 4 MG tablet therapy pack, Use as directed on package, Disp: 21 each, Rfl: 0    naloxone (NARCAN) 4 mg/0 1 mL nasal spray, Administer 1 spray into a nostril  If no response after 2-3 minutes, give another dose in the other nostril using a new spray , Disp: 1 each, Rfl: 1    nitrofurantoin (MACROBID) 100 mg capsule, 100 mg  , Disp: , Rfl:     nitroglycerin (NITROLINGUAL) 0 4 mg/spray spray, USE ONE SPRAY Q 5 MINUTES AS NEEDED FOR CHEST PAIN  IF NO RELIEF, CALL 911 , Disp: , Rfl: 1    rosuvastatin (CRESTOR) 20 MG tablet, Take 20 mg by mouth every evening  , Disp: , Rfl: 2    traZODone (DESYREL) 50 mg tablet, TAKE 0 5-1 TABLETS (25-50 MG TOTAL) BY MOUTH DAILY AT BEDTIME AS NEEDED FOR SLEEP, Disp: 90 tablet, Rfl: 1    valACYclovir (VALTREX) 1,000 mg tablet, Take 1 tablet (1,000 mg total) by mouth 2 (two) times a day for 7 days, Disp: 14 tablet, Rfl: 0    Allergies   Allergen Reactions    Molds & Smuts Allergic Rhinitis    Other Allergic Rhinitis     RAGWEED, CAT DANDER, DOG DANDER     Pollen Extract Other (See Comments)     Cold symptoms              Vitals:    03/09/22 1528   BP: 134/73   Pulse: 71       Objective:                    Right Knee Exam     Tenderness   The patient is experiencing tenderness in the medial joint line and lateral joint line      Range of Motion   Extension: 0   Flexion: 100 (With crepitation and stiffness) Other   Erythema: absent  Sensation: normal  Swelling: mild  Effusion: no effusion present      Left Knee Exam     Tenderness   The patient is experiencing tenderness in the lateral joint line and medial joint line  Range of Motion   Extension: 0   Flexion: 100 (With crepitation and stiffness)     Other   Erythema: absent  Sensation: normal  Swelling: mild  Effusion: no effusion present            Diagnostics, reviewed and taken today if performed as documented:    None performed          Procedures, if performed today:    Large joint arthrocentesis: bilateral knee  Universal Protocol:  Consent: Verbal consent obtained  Risks and benefits: risks, benefits and alternatives were discussed  Consent given by: patient  Time out: Immediately prior to procedure a "time out" was called to verify the correct patient, procedure, equipment, support staff and site/side marked as required  Timeout called at: 3/9/2022 3:44 PM   Patient understanding: patient states understanding of the procedure being performed  Site marked: the operative site was marked  Patient identity confirmed: verbally with patient    Supporting Documentation  Indications: pain and diagnostic evaluation   Procedure Details  Location: knee - bilateral knee  Preparation: Patient was prepped and draped in the usual sterile fashion  Needle size: 22 G  Ultrasound guidance: no  Approach: anteromedial    Medications (Right): 4 mL lidocaine 1 %; 20 mg Sodium Hyaluronate 20 MG/2MLSpecialty Pharmacy Supplied (Right): for right side  Medications (Left): 4 mL lidocaine 1 %; 20 mg Sodium Hyaluronate 20 MG/2ML   Specialty Pharmacy Supplied (Left): for left side  Patient tolerance: patient tolerated the procedure well with no immediate complications  Dressing:  Sterile dressing applied              Portions of the record may have been created with voice recognition software    Occasional wrong word or "sound a like" substitutions may have occurred due to the inherent limitations of voice recognition software  Read the chart carefully and recognize, using context, where substitutions have occurred

## 2022-03-16 ENCOUNTER — OFFICE VISIT (OUTPATIENT)
Dept: OBGYN CLINIC | Facility: CLINIC | Age: 77
End: 2022-03-16
Payer: COMMERCIAL

## 2022-03-16 ENCOUNTER — PROCEDURE VISIT (OUTPATIENT)
Dept: OBGYN CLINIC | Facility: HOSPITAL | Age: 77
End: 2022-03-16
Payer: COMMERCIAL

## 2022-03-16 VITALS
BODY MASS INDEX: 39.27 KG/M2 | SYSTOLIC BLOOD PRESSURE: 144 MMHG | DIASTOLIC BLOOD PRESSURE: 74 MMHG | WEIGHT: 200 LBS | HEART RATE: 71 BPM | HEIGHT: 60 IN

## 2022-03-16 VITALS
BODY MASS INDEX: 38.75 KG/M2 | DIASTOLIC BLOOD PRESSURE: 71 MMHG | HEIGHT: 60 IN | HEART RATE: 70 BPM | SYSTOLIC BLOOD PRESSURE: 138 MMHG

## 2022-03-16 DIAGNOSIS — G89.29 CHRONIC PAIN OF BOTH KNEES: ICD-10-CM

## 2022-03-16 DIAGNOSIS — M65.341 TRIGGER RING FINGER OF RIGHT HAND: ICD-10-CM

## 2022-03-16 DIAGNOSIS — M65.351 TRIGGER LITTLE FINGER OF RIGHT HAND: Primary | ICD-10-CM

## 2022-03-16 DIAGNOSIS — M17.0 BILATERAL PRIMARY OSTEOARTHRITIS OF KNEE: Primary | ICD-10-CM

## 2022-03-16 DIAGNOSIS — Z01.812 ENCOUNTER FOR PRE-OPERATIVE LABORATORY TESTING: ICD-10-CM

## 2022-03-16 DIAGNOSIS — M25.562 CHRONIC PAIN OF BOTH KNEES: ICD-10-CM

## 2022-03-16 DIAGNOSIS — M65.331 TRIGGER FINGER, RIGHT MIDDLE FINGER: ICD-10-CM

## 2022-03-16 DIAGNOSIS — M25.561 CHRONIC PAIN OF BOTH KNEES: ICD-10-CM

## 2022-03-16 PROCEDURE — 99213 OFFICE O/P EST LOW 20 MIN: CPT | Performed by: ORTHOPAEDIC SURGERY

## 2022-03-16 PROCEDURE — 20610 DRAIN/INJ JOINT/BURSA W/O US: CPT | Performed by: ORTHOPAEDIC SURGERY

## 2022-03-16 PROCEDURE — 1036F TOBACCO NON-USER: CPT | Performed by: SURGERY

## 2022-03-16 PROCEDURE — 99214 OFFICE O/P EST MOD 30 MIN: CPT | Performed by: SURGERY

## 2022-03-16 PROCEDURE — 1160F RVW MEDS BY RX/DR IN RCRD: CPT | Performed by: SURGERY

## 2022-03-16 RX ORDER — CHLORHEXIDINE GLUCONATE 0.12 MG/ML
15 RINSE ORAL ONCE
Status: CANCELLED | OUTPATIENT
Start: 2022-03-16 | End: 2022-03-16

## 2022-03-16 RX ORDER — HYALURONATE SODIUM 10 MG/ML
20 SYRINGE (ML) INTRAARTICULAR
Status: COMPLETED | OUTPATIENT
Start: 2022-03-16 | End: 2022-03-16

## 2022-03-16 RX ADMIN — Medication 20 MG: at 16:30

## 2022-03-16 NOTE — PROGRESS NOTES
Assessment:   Diagnosis ICD-10-CM Associated Orders   1  Bilateral primary osteoarthritis of knee  M17 0    2  Chronic pain of both knees  M25 561     M25 562     G89 29        Plan:  Bilateral knees known osteoarthritis  Yajaira's knees were injected with the 3rd of 3 Euflexxa injections today  She tolerated both injections well  Ice and post-injection protocol advised  Weightbearing activities as tolerated  She can follow-up in 3 months  To do next visit:  Return in about 3 months (around 6/16/2022) for Recheck  The above stated was discussed in layman's terms and the patient expressed understanding  All questions were answered to the patient's satisfaction  Scribe Attestation    I,:  Laura Anthony am acting as a scribe while in the presence of the attending physician :       I,:  Ja Tripp MD personally performed the services described in this documentation    as scribed in my presence :             Subjective: Brian Vega is a 77 y o  female who presents today for repeat evaluation of her bilateral knees, known osteoarthritis  She presents today for her 3rd of 3 Euflexxa injections  States her knees are feeling better  Overall happy with injections        Review of systems negative unless otherwise specified in HPI  Review of Systems    Past Medical History:   Diagnosis Date    Acute embolism and thrombosis of unspecified deep veins of unspecified lower extremity (HCC)     Last Assessed:  5/18/17    Anemia     Anosmia     Anxiety     Arthritis     Asthma     Back pain     Bilateral macular retinal edema     CAD (coronary artery disease)     Cataract     Cervical disc herniation     Cervical radiculopathy     Cervical spinal stenosis     Cervical spondylolysis     Chronic mastoiditis     Complex endometrial hyperplasia     Depression     Diabetes mellitus ([address] )     DVT (deep venous thrombosis) (HCC)     Fibromyalgia     Hyperlipidemia     Hypertension     Hypothyroid     Lumbar radiculopathy     Neck pain     Obese     Shingles 2021    Spinal stenosis     Stomach ulcer     Thyroid disease        Past Surgical History:   Procedure Laterality Date    BACK SURGERY      CARPAL TUNNEL RELEASE      CATARACT EXTRACTION      CHOLECYSTECTOMY      COLONOSCOPY      CORONARY ANGIOPLASTY      CORONARY ARTERY BYPASS GRAFT      CYSTOSCOPY N/A 2017    Procedure: Mohan Brilliant;  Surgeon: Anibal Hull MD;  [facility];  Service: Gynecology Oncology    ME LAPAROSCOPY W TOT HYSTERECTUTERUS <=250 GRAM  W TUBE/OVARY N/A 2017    Procedure: ROBOTIC HYSTERECTOMY; BILATERAL SALPINO-OOPHERECTOMY; umbilical hernia repair ;  Surgeon: Anibal Hull MD;  [facility];  Service: Gynecology Oncology    TONSILECTOMY AND ADNOIDECTOMY      UMBILICAL HERNIA REPAIR         Family History   Problem Relation Age of Onset    Arthritis Mother     Leukemia Mother     Other Mother         Anxiety, major depressive disorder, recurrent episode with atypical features    Coronary artery disease Father         Heart problem    Diabetes Father     Other Father         Infectious disease    Alzheimer's disease Maternal Grandmother     Other Maternal Grandfather         Heart problem    Other Daughter         Anxiety, major depressive disorder, recurrent episode with atypical features    Alcohol abuse Other         Grandparent    Cancer Family     Diabetes Family     Hypertension Family     Other Family         Reported prior back trouble, thyroid disorder       Social History     Occupational History    Occupation: Retired   Tobacco Use    Smoking status: Former Smoker     Packs/day: 1 00     Years: 6 00     Pack years: 6 00     Types: Cigarettes     Quit date:      Years since quittin 2    Smokeless tobacco: Never Used    Tobacco comment: Smoked about a half a pack for about 4 yrs  Quite about 50 yrs ago  Vaping Use    Vaping Use: Never used   Substance and Sexual Activity    Alcohol use: No    Drug use: No    Sexual activity: Not Currently     Comment: No known STD risk factors         Current Outpatient Medications:     albuterol (2 5 mg/3 mL) 0 083 % nebulizer solution, Take 3 mL (2 5 mg total) by nebulization every 6 (six) hours as needed for wheezing, Disp: 75 mL, Rfl: 0    albuterol (PROAIR HFA) 90 mcg/act inhaler, Inhale 2 puffs every 6 (six) hours as needed  , Disp: , Rfl:     ALPRAZolam (XANAX) 0 5 mg tablet, Take 1 tablet (0 5 mg total) by mouth 2 (two) times a day as needed for anxiety, Disp: 60 tablet, Rfl: 1    aspirin 81 MG tablet, Take 81 mg by mouth daily, Disp: , Rfl:     B-D UF III MINI PEN NEEDLES 31G X 5 MM MISC, , Disp: , Rfl:     BAQSIMI TWO PACK 3 MG/DOSE POWD, Use as directed , Disp: , Rfl: 0    betamethasone dipropionate 0 05 % lotion,  , Disp: , Rfl:     cholecalciferol (VITAMIN D3) 1,000 units tablet, Take 2,000 Units by mouth daily, Disp: , Rfl:     diclofenac sodium (VOLTAREN) 1 %, Apply 2 g topically 4 (four) times a day, Disp: 1 Tube, Rfl: 0    diltiazem (TIAZAC) 300 MG 24 hr capsule, Take 300 mg by mouth daily, Disp: , Rfl:     docusate sodium (COLACE) 100 mg capsule, Take 1 capsule (100 mg total) by mouth 2 (two) times a day, Disp: 20 capsule, Rfl: 0    DULoxetine (CYMBALTA) 60 mg delayed release capsule, TAKE 1 CAPSULE BY MOUTH EVERY DAY, Disp: 90 capsule, Rfl: 1    enalapril (VASOTEC) 20 mg tablet, Take 20 mg by mouth daily, Disp: , Rfl:     fluocinonide (LIDEX) 0 05 % external solution,  , Disp: , Rfl:     insulin aspart (NovoLOG) 100 units/mL injection, Inject 12 Units under the skin 3 (three) times a day before meals (Patient taking differently: Inject under the skin 3 (three) times a day before meals ), Disp: , Rfl: 0    Insulin Disposable Pump (OmniPod Dash 5 Pack Pods) MISC, USE 1 POD EVERY 1 5 DAYS, Disp: , Rfl:     Insulin Disposable Pump (OmniPod [employer] 5 Pack Pods) MISC, Omnipod Dash Insulin Pod subcutaneous cartridge  USE 1 POD EVERY 1 5 DAYS, Disp: , Rfl:     Insulin Disposable Pump (OMNIPOD DASH 5 PACK) MISC, CHANGE PODS EVERY 2 DAYS UTD, Disp: , Rfl: 3    isosorbide mononitrate (IMDUR) 60 mg 24 hr tablet, TAKE 1 5 TABs AT NIGHT, Disp: , Rfl:     levothyroxine 50 mcg tablet, Take 50 mcg by mouth daily, Disp: , Rfl:     methylPREDNISolone 4 MG tablet therapy pack, Use as directed on package, Disp: 21 each, Rfl: 0    naloxone (NARCAN) 4 mg/0 1 mL nasal spray, Administer 1 spray into a nostril  If no response after 2-3 minutes, give another dose in the other nostril using a new spray , Disp: 1 each, Rfl: 1    nitrofurantoin (MACROBID) 100 mg capsule, 100 mg  , Disp: , Rfl:     nitroglycerin (NITROLINGUAL) 0 4 mg/spray spray, USE ONE SPRAY Q 5 MINUTES AS NEEDED FOR CHEST PAIN   IF NO RELIEF, CALL 911 , Disp: , Rfl: 1    rosuvastatin (CRESTOR) 20 MG tablet, Take 20 mg by mouth every evening  , Disp: , Rfl: 2    traZODone (DESYREL) 50 mg tablet, TAKE 0 5-1 TABLETS (25-50 MG TOTAL) BY MOUTH DAILY AT BEDTIME AS NEEDED FOR SLEEP, Disp: 90 tablet, Rfl: 1    valACYclovir (VALTREX) 1,000 mg tablet, Take 1 tablet (1,000 mg total) by mouth 2 (two) times a day for 7 days, Disp: 14 tablet, Rfl: 0    Allergies   Allergen Reactions    Molds & Smuts Allergic Rhinitis    Other Allergic Rhinitis     RAGWEED, CAT DANDER, DOG DANDER     Pollen Extract Other (See Comments)     Cold symptoms              Vitals:    03/16/22 1603   BP: 138/71   Pulse: 70       Objective:                    Right Knee Exam     Other   Erythema: absent  Sensation: normal  Swelling: none      Left Knee Exam     Other   Erythema: absent  Sensation: normal  Swelling: none            Diagnostics, reviewed and taken today if performed as documented:    None performed      The attending physician has personally reviewed the pertinent films in PACS and interpretation is as follows:      Procedures, if performed today:    Large joint arthrocentesis: bilateral knee  Universal Protocol:  Consent: Verbal consent obtained  Risks and benefits: risks, benefits and alternatives were discussed  Consent given by: patient  Timeout called at: 3/16/2022 4:27 PM   Patient understanding: patient states understanding of the procedure being performed  Patient identity confirmed: verbally with patient    Supporting Documentation  Indications: pain and diagnostic evaluation   Procedure Details  Location: knee - bilateral knee  Preparation: Patient was prepped and draped in the usual sterile fashion  Needle size: 25 G  Ultrasound guidance: no    Medications (Right): 20 mg Sodium Hyaluronate 20 MG/2MLSpecialty Pharmacy Supplied (Right): for right side  Medications (Left): 20 mg Sodium Hyaluronate 20 MG/2ML   Specialty Pharmacy Supplied (Left): for left side          None performed      Portions of the record may have been created with voice recognition software  Occasional wrong word or "sound a like" substitutions may have occurred due to the inherent limitations of voice recognition software  Read the chart carefully and recognize, using context, where substitutions have occurred

## 2022-03-16 NOTE — H&P
Tereso OREILLY  Attending, Orthopaedic Surgery  Hand, Wrist, and Elbow Surgery  [facility]      ORTHOPAEDIC HAND, WRIST, AND ELBOW OFFICE  VISIT       ASSESSMENT/PLAN:      42-year-old female with trigger fingers of the right long, ring, and small fingers  Patient has tried and failed conservative measures including injections and would like to proceed eventually with trigger finger releases of the above 3 fingers  Risks benefits alternatives were discussed and informed consent was signed today  I do anticipate need for occupational therapy following surgery due to baseline stiffness  Patient is diabetic and will require some preoperative testing prior to surgery  Postoperative protocol and expectations were discussed and all questions answered  Will see her in 10-14 days following surgery for suture removal and postop evaluation    The patient verbalized understanding of exam findings and treatment plan  We engaged in the shared decision-making process and treatment options were discussed at length with the patient  Surgical and conservative management discussed today along with risks and benefits  Diagnoses and all orders for this visit:    Trigger little finger of right hand  -     Case request operating room: RELEASE TRIGGER FINGER RIGHT MIDDLE, RING, AND SMALL; Standing  -     Basic metabolic panel; Future  -     EKG 12 lead; Future  -     Case request operating room: RELEASE TRIGGER FINGER RIGHT MIDDLE, RING, AND SMALL    Trigger ring finger of right hand  -     Case request operating room: RELEASE TRIGGER FINGER RIGHT MIDDLE, RING, AND SMALL; Standing  -     Basic metabolic panel; Future  -     EKG 12 lead;  Future  -     Case request operating room: RELEASE TRIGGER FINGER RIGHT MIDDLE, RING, AND SMALL    Trigger finger, right middle finger  -     Case request operating room: RELEASE TRIGGER FINGER RIGHT MIDDLE, RING, AND SMALL; Standing  -     Basic metabolic panel; Future  -     EKG 12 lead; Future  -     Case request operating room: RELEASE TRIGGER FINGER RIGHT MIDDLE, RING, AND SMALL    Encounter for pre-operative laboratory testing  -     Basic metabolic panel; Future  -     EKG 12 lead; Future        Follow Up:  Return for After Surgery  To Do Next Visit:  Re-evaluation of current issue    Operative Discussions:  Trigger Finger Release: The anatomy and physiology of trigger finger was discussed with the patient today in the office  Edema and increased contact pressure within the flexor tendons at the A1 pulley can cause pain, crepitation, and limitation of function  Treatment options include resting MP blocking splints to decrease edema, oral anti-inflammatory medications, home or formal therapy exercises, up to 2 steroid injections or surgical release  While majority of patients do respond to conservative treatment, up to 20% may require surgical release  The patient has elected release of the trigger finger  The patient has elected to undergo a release of the A1 pulley (trigger finger)  A small incision will be made over the palmar aspect of the hand, the tendon sheath holding the flexor tendons will be released  In the postoperative period, light activities are allowed immediately, driving is allowed when narcotic medication has stopped, and the incision may get wet after 2 days  Heavy activities (lifting more than approximately 10 pounds) will be allowed after the follow up appointment in 1-2 weeks  While the pain and discomfort within the wrist typically improves rapidly, some residual discomfort may be present for up to 6 weeks  The nodule that is typically palpable in the palmar aspect of the hand will not be removed, as this would necessitate removal of a portion of the flexor tendon, however the catching, clicking, and locking should resolve  Approximate success rate is 98%  The risks and benefits of the procedure were explained to the patient, which include, but are not limited to: Bleeding, infection, recurrence, pain, scar, damage to tendons, damage to nerves, and damage to blood vessels, need for future surgery and complications related to anesthesia  If bony work is done, risks also include malunion and nonunion  These risks, along with alternative conservative treatment options, and postoperative protocols were voiced back and understood by the patient  All questions were answered to the patient's satisfaction  The patient agrees to comply with a standard postoperative protocol, and is willing to proceed  Education was provided via written and auditory forms  There were no barriers to learning  Written handouts regarding wound care, incision and scar care, and general preoperative information, as well as risks and benefits were provided to the patient       ____________________________________________________________________________________________________________________________________________      CHIEF COMPLAINT:  Chief Complaint   Patient presents with    Right Hand - Locking       SUBJECTIVE:  Cisco Mcbride is a 77y o  year old RHD female who presents for evaluation of right hand trigger fingers in the right long, ring, and small fingers  She did undergo injections in the ring and small which did not help  Long finger began about a week ago  patient does have hx of diabetes which is relatively controlled         Pain/symptom timing:  Worse during the day when active  Pain/symptom context:  Worse with activites and work  Pain/symptom modifying factors:  Rest makes better, activities make worse  Pain/symptom associated signs/symptoms: none    Prior treatment   · NSAIDsYes   · Injections Yes   · Bracing/Orthotics No    Physical Therapy No     I have personally reviewed all the relevant PMH, PSH, SH, FH, Medications and allergies      PAST MEDICAL HISTORY:  Past Medical History:   Diagnosis Date    Acute embolism and thrombosis of unspecified deep veins of unspecified lower extremity (HCC)     Last Assessed:  5/18/17    Anemia     Anosmia     Anxiety     Arthritis     Asthma     Back pain     Bilateral macular retinal edema     CAD (coronary artery disease)     Cataract     Cervical disc herniation     Cervical radiculopathy     Cervical spinal stenosis     Cervical spondylolysis     Chronic mastoiditis     Complex endometrial hyperplasia     Depression     Diabetes mellitus ([address] )     DVT (deep venous thrombosis) (HCC)     Fibromyalgia     Hyperlipidemia     Hypertension     Hypothyroid     Lumbar radiculopathy     Neck pain     Obese     Shingles 07/01/2021    Spinal stenosis     Stomach ulcer     Thyroid disease        PAST SURGICAL HISTORY:  Past Surgical History:   Procedure Laterality Date    BACK SURGERY      CARPAL TUNNEL RELEASE      CATARACT EXTRACTION      CHOLECYSTECTOMY      COLONOSCOPY      CORONARY ANGIOPLASTY      CORONARY ARTERY BYPASS GRAFT      CYSTOSCOPY N/A 6/20/2017    Procedure: CYSTOSCOPY;  Surgeon: Natalie Chavira MD;  Location: [facility];  Service: Gynecology Oncology    AR LAPAROSCOPY W TOT HYSTERECTUTERUS <=250 GRAM  W TUBE/OVARY N/A 6/20/2017    Procedure: ROBOTIC HYSTERECTOMY; BILATERAL SALPINO-OOPHERECTOMY; umbilical hernia repair ;  Surgeon: Natalie Chavira MD;  Location: [facility];  Service: Gynecology Oncology    TONSILECTOMY AND ADNOIDECTOMY      UMBILICAL HERNIA REPAIR         FAMILY HISTORY:  Family History   Problem Relation Age of Onset    Arthritis Mother     Leukemia Mother     Other Mother         Anxiety, major depressive disorder, recurrent episode with atypical features    Coronary artery disease Father         Heart problem    Diabetes Father     Other Father         Infectious disease    Alzheimer's disease Maternal Grandmother     Other Maternal Grandfather         Heart problem    Other Daughter         Anxiety, major depressive disorder, recurrent episode with atypical features    Alcohol abuse Other         Grandparent    Cancer Family     Diabetes Family     Hypertension Family     Other Family         Reported prior back trouble, thyroid disorder       SOCIAL HISTORY:  Social History     Tobacco Use    Smoking status: Former Smoker     Packs/day: 1 00     Years: 6 00     Pack years: 6 00     Types: Cigarettes     Quit date:      Years since quittin 2    Smokeless tobacco: Never Used    Tobacco comment: Smoked about a half a pack for about 4 yrs  Quite about 50 yrs ago     Vaping Use    Vaping Use: Never used   Substance Use Topics    Alcohol use: No    Drug use: No       MEDICATIONS:    Current Outpatient Medications:     albuterol (2 5 mg/3 mL) 0 083 % nebulizer solution, Take 3 mL (2 5 mg total) by nebulization every 6 (six) hours as needed for wheezing, Disp: 75 mL, Rfl: 0    albuterol (PROAIR HFA) 90 mcg/act inhaler, Inhale 2 puffs every 6 (six) hours as needed  , Disp: , Rfl:     ALPRAZolam (XANAX) 0 5 mg tablet, Take 1 tablet (0 5 mg total) by mouth 2 (two) times a day as needed for anxiety, Disp: 60 tablet, Rfl: 1    aspirin 81 MG tablet, Take 81 mg by mouth daily, Disp: , Rfl:     B-D UF III MINI PEN NEEDLES 31G X 5 MM MISC, , Disp: , Rfl:     BAQSIMI TWO PACK 3 MG/DOSE POWD, Use as directed , Disp: , Rfl: 0    betamethasone dipropionate 0 05 % lotion,  , Disp: , Rfl:     cholecalciferol (VITAMIN D3) 1,000 units tablet, Take 2,000 Units by mouth daily, Disp: , Rfl:     diclofenac sodium (VOLTAREN) 1 %, Apply 2 g topically 4 (four) times a day, Disp: 1 Tube, Rfl: 0    diltiazem (TIAZAC) 300 MG 24 hr capsule, Take 300 mg by mouth daily, Disp: , Rfl:     docusate sodium (COLACE) 100 mg capsule, Take 1 capsule (100 mg total) by mouth 2 (two) times a day, Disp: 20 capsule, Rfl: 0    DULoxetine (CYMBALTA) 60 mg delayed release capsule, TAKE 1 CAPSULE BY MOUTH EVERY DAY, Disp: 90 capsule, Rfl: 1    enalapril (VASOTEC) 20 mg tablet, Take 20 mg by mouth daily, Disp: , Rfl:     fluocinonide (LIDEX) 0 05 % external solution,  , Disp: , Rfl:     insulin aspart (NovoLOG) 100 units/mL injection, Inject 12 Units under the skin 3 (three) times a day before meals (Patient taking differently: Inject under the skin 3 (three) times a day before meals ), Disp: , Rfl: 0    Insulin Disposable Pump (OmniPod Dash 5 Pack Pods) MISC, USE 1 POD EVERY 1 5 DAYS, Disp: , Rfl:     Insulin Disposable Pump (OmniPod Dash 5 Pack Pods) MISC, Omnipod Dash Insulin Pod subcutaneous cartridge  USE 1 POD EVERY 1 5 DAYS, Disp: , Rfl:     Insulin Disposable Pump (OMNIPOD DASH 5 PACK) MISC, CHANGE PODS EVERY 2 DAYS UTD, Disp: , Rfl: 3    isosorbide mononitrate (IMDUR) 60 mg 24 hr tablet, TAKE 1 5 TABs AT NIGHT, Disp: , Rfl:     levothyroxine 50 mcg tablet, Take 50 mcg by mouth daily, Disp: , Rfl:     methylPREDNISolone 4 MG tablet therapy pack, Use as directed on package, Disp: 21 each, Rfl: 0    naloxone (NARCAN) 4 mg/0 1 mL nasal spray, Administer 1 spray into a nostril  If no response after 2-3 minutes, give another dose in the other nostril using a new spray , Disp: 1 each, Rfl: 1    nitrofurantoin (MACROBID) 100 mg capsule, 100 mg  , Disp: , Rfl:     nitroglycerin (NITROLINGUAL) 0 4 mg/spray spray, USE ONE SPRAY Q 5 MINUTES AS NEEDED FOR CHEST PAIN   IF NO RELIEF, CALL 911 , Disp: , Rfl: 1    rosuvastatin (CRESTOR) 20 MG tablet, Take 20 mg by mouth every evening  , Disp: , Rfl: 2    traZODone (DESYREL) 50 mg tablet, TAKE 0 5-1 TABLETS (25-50 MG TOTAL) BY MOUTH DAILY AT BEDTIME AS NEEDED FOR SLEEP, Disp: 90 tablet, Rfl: 1    valACYclovir (VALTREX) 1,000 mg tablet, Take 1 tablet (1,000 mg total) by mouth 2 (two) times a day for 7 days, Disp: 14 tablet, Rfl: 0    ALLERGIES:  Allergies   Allergen Reactions    Molds & Smuts Allergic Rhinitis    Other Allergic Rhinitis     RAGWEED, CAT DANDER, DOG DANDER     Pollen Extract Other (See Comments)     Cold symptoms             REVIEW OF SYSTEMS:  Review of Systems   Constitutional: Negative for chills, fatigue and fever  HENT: Negative for hearing loss, nosebleeds and sore throat  Eyes: Negative for redness and visual disturbance  Respiratory: Negative for cough, shortness of breath and wheezing  Cardiovascular: Negative for chest pain, palpitations and leg swelling  Gastrointestinal: Negative for abdominal pain, nausea and vomiting  Endocrine: Negative for polydipsia and polyuria  Genitourinary: Negative for difficulty urinating, dysuria and hematuria  Musculoskeletal: Positive for arthralgias  Negative for joint swelling and myalgias  Skin: Negative for rash and wound  Neurological: Negative for speech difficulty, weakness, numbness and headaches  Psychiatric/Behavioral: Negative for decreased concentration and suicidal ideas  The patient is not nervous/anxious          VITALS:  Vitals:    03/16/22 1217   BP: 144/74   Pulse: 71       LABS:  HgA1c:   Lab Results   Component Value Date    HGBA1C 7 2 (H) 12/13/2021     BMP:   Lab Results   Component Value Date    GLUCOSE 248 (H) 11/09/2015    CALCIUM 9 6 12/13/2021     11/16/2017    K 4 7 12/13/2021    CO2 26 12/13/2021     12/13/2021    BUN 25 12/13/2021    CREATININE 1 40 (H) 12/13/2021       _____________________________________________________  PHYSICAL EXAMINATION:  General: well developed and well nourished, alert, oriented times 3 and appears comfortable  Psychiatric: Normal  HEENT: Normocephalic, Atraumatic Trachea Midline, No torticollis  Pulmonary: No audible wheezing or respiratory distress   Abdomen/GI: Non tender, non distended   Cardiovascular: No pitting edema, 2+ radial pulse   Skin: No masses, erythema, lacerations, fluctation, ulcerations  Neurovascular: Sensation Intact to the Median, Ulnar, Radial Nerve, Motor Intact to the Median, Ulnar, Radial Nerve and Pulses Intact  Musculoskeletal: Normal, except as noted in detailed exam and in HPI  MUSCULOSKELETAL EXAMINATION:  right small and ring finger:  Negative palpable nodule over the A1 pulley  Positive tenderness to palpation over A1 pulley  Negative catching  Positive clicking  right middle finger:  Negative palpable nodule over the A1 pulley  Negative tenderness to palpation over A1 pulley  Negative catching   Negative clicking       ___________________________________________________  STUDIES REVIEWED:  No new imaging      PROCEDURES PERFORMED:  Procedures  No Procedures performed today    _____________________________________________________      Yaneth Hussein    I,:  Bess Goff PA-C am acting as a scribe while in the presence of the attending physician :       I,:  Leah Salas MD personally performed the services described in this documentation    as scribed in my presence :

## 2022-03-21 ENCOUNTER — TELEPHONE (OUTPATIENT)
Dept: NEPHROLOGY | Facility: CLINIC | Age: 77
End: 2022-03-21

## 2022-03-21 NOTE — TELEPHONE ENCOUNTER
RICKIE for patient reminding them to go for lab work before her appt on 3/25  Asked patient to call back if she has any questions

## 2022-03-24 ENCOUNTER — TELEPHONE (OUTPATIENT)
Dept: NEPHROLOGY | Facility: CLINIC | Age: 77
End: 2022-03-24

## 2022-03-24 NOTE — TELEPHONE ENCOUNTER
Appointment Confirmation   Person confirmed appointment with  If not patient, name of the person Patient    Date and time of appointment 3/25  1:30    Patient acknowledged and will be at appointment? yes    Did you advise the patient that they will need a urine sample if they are a new patient?  N/A    Did you advise the patient to bring their current medications for verification? (including any OTC) Yes    Additional Information

## 2022-03-25 ENCOUNTER — TELEPHONE (OUTPATIENT)
Dept: NEPHROLOGY | Facility: CLINIC | Age: 77
End: 2022-03-25

## 2022-03-25 ENCOUNTER — HOSPITAL ENCOUNTER (EMERGENCY)
Facility: HOSPITAL | Age: 77
Discharge: HOME | End: 2022-03-25
Attending: EMERGENCY MEDICINE | Admitting: EMERGENCY MEDICINE
Payer: COMMERCIAL

## 2022-03-25 VITALS
TEMPERATURE: 97.6 F | RESPIRATION RATE: 18 BRPM | OXYGEN SATURATION: 98 % | HEART RATE: 77 BPM | SYSTOLIC BLOOD PRESSURE: 149 MMHG | DIASTOLIC BLOOD PRESSURE: 63 MMHG

## 2022-03-25 DIAGNOSIS — K59.00 CONSTIPATION: ICD-10-CM

## 2022-03-25 DIAGNOSIS — K43.9 VENTRAL HERNIA: Primary | ICD-10-CM

## 2022-03-25 PROCEDURE — 99283 EMERGENCY DEPT VISIT LOW MDM: CPT

## 2022-03-25 PROCEDURE — 99284 EMERGENCY DEPT VISIT MOD MDM: CPT | Performed by: EMERGENCY MEDICINE

## 2022-03-25 RX ORDER — POLYETHYLENE GLYCOL 3350 17 G/17G
17 POWDER, FOR SOLUTION ORAL DAILY PRN
Qty: 14 EACH | Refills: 0 | Status: SHIPPED | OUTPATIENT
Start: 2022-03-25 | End: 2022-03-25 | Stop reason: CLARIF

## 2022-03-25 RX ORDER — POLYETHYLENE GLYCOL 3350 17 G/17G
17 POWDER, FOR SOLUTION ORAL DAILY PRN
Qty: 14 EACH | Refills: 0 | Status: SHIPPED | OUTPATIENT
Start: 2022-03-25 | End: 2022-05-06

## 2022-03-25 RX ORDER — DOCUSATE SODIUM 100 MG/1
100 CAPSULE, LIQUID FILLED ORAL 2 TIMES DAILY
Qty: 20 CAPSULE | Refills: 0 | Status: SHIPPED | OUTPATIENT
Start: 2022-03-25 | End: 2022-03-25 | Stop reason: CLARIF

## 2022-03-25 RX ORDER — DOCUSATE SODIUM 100 MG/1
100 CAPSULE, LIQUID FILLED ORAL EVERY 12 HOURS
Qty: 60 CAPSULE | Refills: 0 | Status: SHIPPED | OUTPATIENT
Start: 2022-03-25 | End: 2022-04-22 | Stop reason: SDUPTHER

## 2022-03-25 NOTE — TELEPHONE ENCOUNTER
Cancelled her appointment, going to emergency room as she thinks she has a hernia, she will call back to reschedule her appointment

## 2022-03-25 NOTE — ED ATTENDING ATTESTATION
3/25/2022  IZaki DO, saw and evaluated the patient  I have discussed the patient with the resident/non-physician practitioner and agree with the resident's/non-physician practitioner's findings, Plan of Care, and MDM as documented in the resident's/non-physician practitioner's note, except where noted  All available labs and Radiology studies were reviewed  I was present for key portions of any procedure(s) performed by the resident/non-physician practitioner and I was immediately available to provide assistance  At this point I agree with the current assessment done in the Emergency Department  I have conducted an independent evaluation of this patient a history and physical is as follows:    69-year-old female coming into the ED for evaluation umbilical hernia  She states it has been bulging more so when she stands  She states she has been constipated lately and has been having hard stools  She denies any pain to the area however  She tried again her primary doctor who could see her until Thursday of next week so she decided come into the ER for evaluation  No vomiting or diarrhea, she is moving her bowels  On physical exam: pt is well appearing, in NAD  mucous membranes moist   CTA b/l , heart RRR  Abdomen NT, ND, no R/R/G  There is a soft, reducible umbilical hernia present  When she sits up, she has evidence of diastasis recti, umbilical hernia prolapses, but is again easily reducible  Neuro intact, gcs 15  Cap refill < 2 sec, skin warm and dry  ED Course     Reducible umbilical hernia, give abdominal binder, discharged home on bowel regimen, follow-up with general surgery      Critical Care Time  Procedures

## 2022-03-25 NOTE — ED PROVIDER NOTES
History  Chief Complaint   Patient presents with    Abdominal Problem     pt reprots her hernia has gotten bigger in the last few days  no pain  couldnt get in to pcp today so came to [facility] is a 55-year-old female presenting today for evaluation of an enlarging ventral hernia  The patient says that her hernia has been present for some time now, she has been following with a general surgeon, however over the last several days she has noticed that the hernia has increased in size, she describes it is being his biggest a softball  She denies any abdominal pain  She is having bowel movements daily, she describes her bowel movements lately though is being hard and she often has to strain to have a bowel movement  She currently does not have a bowel regimen to help with any constipation she may have  History provided by:  Patient   used: No    Abdominal Problem  Associated symptoms: constipation    Associated symptoms: no chest pain, no chills, no cough, no dysuria, no fever, no hematuria, no shortness of breath, no sore throat and no vomiting        Prior to Admission Medications   Prescriptions Last Dose Informant Patient Reported? Taking?    ALPRAZolam (XANAX) 0 5 mg tablet  Self No No   Sig: Take 1 tablet (0 5 mg total) by mouth 2 (two) times a day as needed for anxiety   B-D UF III MINI PEN NEEDLES 31G X 5 MM MISC  Self Yes No   BAQSIMI TWO PACK 3 MG/DOSE POWD  Self Yes No   Sig: Use as directed    DULoxetine (CYMBALTA) 60 mg delayed release capsule  Self No No   Sig: TAKE 1 CAPSULE BY MOUTH EVERY DAY   Insulin Disposable Pump (OMNIPOD DASH 5 PACK) MISC  Self Yes No   Sig: CHANGE PODS EVERY 2 DAYS UTD   Insulin Disposable Pump (OmniPod Dash 5 Pack Pods) MISC  Self Yes No   Sig: USE 1 POD EVERY 1 5 DAYS   Insulin Disposable Pump (OmniPod Dash 5 Pack Pods) MISC  Self Yes No   Sig: Omnipod Dash Insulin Pod subcutaneous cartridge   USE 1 POD EVERY 1 5 DAYS   albuterol (2 5 mg/3 mL) 0  083 % nebulizer solution  Self No No   Sig: Take 3 mL (2 5 mg total) by nebulization every 6 (six) hours as needed for wheezing   albuterol (PROAIR HFA) 90 mcg/act inhaler  Self Yes No   Sig: Inhale 2 puffs every 6 (six) hours as needed     aspirin 81 MG tablet  Self Yes No   Sig: Take 81 mg by mouth daily   betamethasone dipropionate 0 05 % lotion  Self Yes No   Sig:     cholecalciferol (VITAMIN D3) 1,000 units tablet  Self Yes No   Sig: Take 2,000 Units by mouth daily   diclofenac sodium (VOLTAREN) 1 %  Self No No   Sig: Apply 2 g topically 4 (four) times a day   diltiazem (TIAZAC) 300 MG 24 hr capsule  Self Yes No   Sig: Take 300 mg by mouth daily   docusate sodium (COLACE) 100 mg capsule  Self No No   Sig: Take 1 capsule (100 mg total) by mouth 2 (two) times a day   enalapril (VASOTEC) 20 mg tablet  Self Yes No   Sig: Take 20 mg by mouth daily   fluocinonide (LIDEX) 0 05 % external solution  Self Yes No   Sig:     insulin aspart (NovoLOG) 100 units/mL injection  Self No No   Sig: Inject 12 Units under the skin 3 (three) times a day before meals   Patient taking differently: Inject under the skin 3 (three) times a day before meals    isosorbide mononitrate (IMDUR) 60 mg 24 hr tablet  Self Yes No   Sig: TAKE 1 5 TABs AT NIGHT   levothyroxine 50 mcg tablet  Self Yes No   Sig: Take 50 mcg by mouth daily   methylPREDNISolone 4 MG tablet therapy pack  Self No No   Sig: Use as directed on package   naloxone (NARCAN) 4 mg/0 1 mL nasal spray  Self No No   Sig: Administer 1 spray into a nostril  If no response after 2-3 minutes, give another dose in the other nostril using a new spray  nitrofurantoin (MACROBID) 100 mg capsule  Self Yes No   Si mg     nitroglycerin (NITROLINGUAL) 0 4 mg/spray spray  Self Yes No   Sig: USE ONE SPRAY Q 5 MINUTES AS NEEDED FOR CHEST PAIN  IF NO RELIEF, CALL 911     rosuvastatin (CRESTOR) 20 MG tablet  Self Yes No   Sig: Take 20 mg by mouth every evening     traZODone (DESYREL) 50 mg tablet  Self No No   Sig: TAKE 0 5-1 TABLETS (25-50 MG TOTAL) BY MOUTH DAILY AT BEDTIME AS NEEDED FOR SLEEP   valACYclovir (VALTREX) 1,000 mg tablet   No No   Sig: Take 1 tablet (1,000 mg total) by mouth 2 (two) times a day for 7 days      Facility-Administered Medications: None       Past Medical History:   Diagnosis Date    Acute embolism and thrombosis of unspecified deep veins of unspecified lower extremity (HCC)     Last Assessed:  5/18/17    Anemia     Anosmia     Anxiety     Arthritis     Asthma     Back pain     Bilateral macular retinal edema     CAD (coronary artery disease)     Cataract     Cervical disc herniation     Cervical radiculopathy     Cervical spinal stenosis     Cervical spondylolysis     Chronic mastoiditis     Complex endometrial hyperplasia     Depression     Diabetes mellitus ([address] )     [facility]     Fibromyalgia     Hyperlipidemia     Hypertension     Hypothyroid     Lumbar radiculopathy     Neck pain     Obese     Shingles 07/01/2021    Spinal stenosis     Stomach ulcer     Thyroid disease        Past Surgical History:   Procedure Laterality Date    BACK SURGERY      CARPAL TUNNEL RELEASE      CATARACT EXTRACTION      CHOLECYSTECTOMY      COLONOSCOPY      CORONARY ANGIOPLASTY      CORONARY ARTERY BYPASS GRAFT      CYSTOSCOPY N/A 6/20/2017    Procedure: CYSTOSCOPY;  Surgeon: Chelly John MD;  [facility];  Service: Gynecology Oncology    AK LAPAROSCOPY W TOT HYSTERECTUTERUS <=250 GRAM  W TUBE/OVARY N/A 6/20/2017    Procedure: ROBOTIC HYSTERECTOMY; BILATERAL SALPINO-OOPHERECTOMY; umbilical hernia repair ;  Surgeon: Chelly John MD;  [facility];  Service: Gynecology Oncology    TONSILECTOMY AND ADNOIDECTOMY      UMBILICAL HERNIA REPAIR         Family History   Problem Relation Age of Onset    Arthritis Mother     Leukemia Mother     Other Mother         Anxiety, major depressive disorder, recurrent episode with atypical features    Coronary artery disease Father         Heart problem    Diabetes Father     Other Father         Infectious disease    Alzheimer's disease Maternal Grandmother     Other Maternal Grandfather         Heart problem    Other Daughter         Anxiety, major depressive disorder, recurrent episode with atypical features    Alcohol abuse Other         Grandparent    Cancer Family     Diabetes Family     Hypertension Family     Other Family         Reported prior back trouble, thyroid disorder     I have reviewed and agree with the history as documented  E-Cigarette/Vaping    E-Cigarette Use Never User      E-Cigarette/Vaping Substances    Nicotine No     THC No     CBD No     Flavoring No     Other No     Unknown No      Social History     Tobacco Use    Smoking status: Former Smoker     Packs/day:      Years:      Pack years:      Types: Cigarettes     Quit date:      Years since quittin 2    Smokeless tobacco: Never Used    Tobacco comment: Smoked about a half a pack for about 4 yrs  Quite about 50 yrs ago  Vaping Use    Vaping Use: Never used   Substance Use Topics    Alcohol use: No    Drug use: No        Review of Systems   Constitutional: Negative for chills and fever  HENT: Negative for ear pain and sore throat  Eyes: Negative for pain and visual disturbance  Respiratory: Negative for cough and shortness of breath  Cardiovascular: Negative for chest pain and palpitations  Gastrointestinal: Positive for constipation  Negative for abdominal pain and vomiting  Ventral hernia   Genitourinary: Negative for dysuria and hematuria  Musculoskeletal: Negative for arthralgias and back pain  Skin: Negative for color change and rash  Neurological: Negative for seizures and syncope  All other systems reviewed and are negative        Physical Exam  ED Triage Vitals   Temperature Pulse Respirations Blood Pressure SpO2   03/25/22 1242 03/25/22 1244 03/25/22 1244 03/25/22 1244 03/25/22 1244   97 6 °F (36 4 °C) 77 18 149/63 98 %      Temp src Heart Rate Source Patient Position - Orthostatic VS BP Location FiO2 (%)   -- 03/25/22 1244 03/25/22 1244 03/25/22 1244 --    Monitor Sitting Left arm       Pain Score       03/25/22 1244       No Pain             Orthostatic Vital Signs  Vitals:    03/25/22 1244   BP: 149/63   Pulse: 77   Patient Position - Orthostatic VS: Sitting       Physical Exam  Vitals and nursing note reviewed  Constitutional:       Appearance: Normal appearance  HENT:      Head: Normocephalic and atraumatic  Mouth/Throat:      Mouth: Mucous membranes are moist       Pharynx: Oropharynx is clear  Eyes:      General: No scleral icterus  Conjunctiva/sclera: Conjunctivae normal    Cardiovascular:      Rate and Rhythm: Normal rate and regular rhythm  Heart sounds: Normal heart sounds  Pulmonary:      Effort: Pulmonary effort is normal  No respiratory distress  Breath sounds: Normal breath sounds  Abdominal:      General: Abdomen is flat  There is no distension  Tenderness: There is no abdominal tenderness  Hernia: A hernia is present  Hernia is present in the ventral area  Comments: Patient a large ventral hernia, it is easily reducible, there is no overlying erythema, the skin is not dusky   Musculoskeletal:         General: No tenderness or signs of injury  Cervical back: Neck supple  No rigidity  Skin:     General: Skin is warm  Coloration: Skin is not jaundiced  Findings: No erythema or rash  Neurological:      General: No focal deficit present  Mental Status: She is alert  Mental status is at baseline     Psychiatric:         Mood and Affect: Mood normal          Behavior: Behavior normal          ED Medications  Medications - No data to display    Diagnostic Studies  Results Reviewed     None                 No orders to display Procedures  Procedures      ED Course                                       MDM  Number of Diagnoses or Management Options  Constipation: new and requires workup  Ventral hernia: established and worsening     Amount and/or Complexity of Data Reviewed  Review and summarize past medical records: yes  Independent visualization of images, tracings, or specimens: yes    Risk of Complications, Morbidity, and/or Mortality  General comments: Ellis Camilo is a 25-year-old female with known ventral hernia presenting today for evaluation of her hernia that she believes is enlarging in size  Over the last several days she has noticed that her hernia has grown larger, she denies any abdominal pain, she is still making stools daily, though she notes she often has to strain when she is having a bowel movement  On exam, patient does have a large ventral hernia  It is soft, easily reducible, there is no overlying dusky or erythematous skin  Patient did not have any tenderness in this area  Patient stable for discharge  Patient advised to not do any heavy lifting, I am also starting the patient on a bowel regimen with Colace and MiraLax to avoid straining she is having bowel movements  I provided a phone number for a general surgeon so that she can make an appointment and eventually have surgery to fix her hernia  I gave her very strict return precautions, she was agreeable to the plan  Disposition  Final diagnoses:   Ventral hernia   Constipation     Time reflects when diagnosis was documented in both MDM as applicable and the Disposition within this note     Time User Action Codes Description Comment    3/25/2022  2:06 PM Dirk Carmona Add [K43 9] Ventral hernia     3/25/2022  2:07 PM Dirk Carmona Add [K59 00] Constipation       ED Disposition     ED Disposition Condition Date/Time Comment    Discharge Stable Fri Mar 25, 2022  2:06 PM Shahram June discharge to home/self care              Follow-up Information     Follow up With Specialties Details Why Contact Deepti Alanis DO General Surgery Schedule an appointment as soon as possible for a visit   [address]  [address] [facility]  [phone]            Patient's Medications   Discharge Prescriptions    DOCUSATE SODIUM (COLACE) 100 MG CAPSULE    Take 1 capsule (100 mg total) by mouth every 12 (twelve) hours       Start Date: 3/25/2022 End Date: --       Order Dose: 100 mg       Quantity: 60 capsule    Refills: 0    POLYETHYLENE GLYCOL (MIRALAX) 17 G PACKET    Take 17 g by mouth daily as needed (Constipation) for up to 14 days       Start Date: 3/25/2022 End Date: 4/8/2022       Order Dose: 17 g       Quantity: 14 each    Refills: 0     No discharge procedures on file  PDMP Review       Value Time User    PDMP Reviewed  Yes 12/15/2021 11:50 AM Pankaj Albright DO           ED Provider  Attending physically available and evaluated Maria Esther Muir  I managed the patient along with the ED Attending      Electronically Signed by         Yana Ricks DO  03/25/22 1771

## 2022-03-25 NOTE — DISCHARGE INSTRUCTIONS
Call and schedule an appointment with the surgeon, I have provided a phone number for you  Take the medications as prescribed for your constipation, take Colace daily and add MiraLax as needed  Return to the emergency department should he be unable to pass gas, unable to have bowel movements, severe abdominal pain, color changes overlying the skin where your hernia is, or any other concerning symptoms

## 2022-03-29 ENCOUNTER — TELEPHONE (OUTPATIENT)
Dept: OTHER | Facility: OTHER | Age: 77
End: 2022-03-29

## 2022-03-30 ENCOUNTER — RA CDI HCC (OUTPATIENT)
Dept: OTHER | Facility: HOSPITAL | Age: 77
End: 2022-03-30

## 2022-03-30 NOTE — PROGRESS NOTES
E11 22, E11 51  [facility] 75  coding opportunities          Chart Reviewed number of suggestions sent to Provider: 2     Patients Insurance     Medicare Insurance: [employer]

## 2022-03-31 ENCOUNTER — TELEPHONE (OUTPATIENT)
Dept: OTHER | Facility: OTHER | Age: 77
End: 2022-03-31

## 2022-03-31 ENCOUNTER — OFFICE VISIT (OUTPATIENT)
Dept: INTERNAL MEDICINE CLINIC | Facility: CLINIC | Age: 77
End: 2022-03-31
Payer: COMMERCIAL

## 2022-03-31 VITALS
DIASTOLIC BLOOD PRESSURE: 60 MMHG | OXYGEN SATURATION: 98 % | HEIGHT: 60 IN | WEIGHT: 198.8 LBS | BODY MASS INDEX: 39.03 KG/M2 | SYSTOLIC BLOOD PRESSURE: 130 MMHG | HEART RATE: 71 BPM

## 2022-03-31 DIAGNOSIS — K43.9 VENTRAL HERNIA WITHOUT OBSTRUCTION OR GANGRENE: ICD-10-CM

## 2022-03-31 DIAGNOSIS — K59.09 OTHER CONSTIPATION: ICD-10-CM

## 2022-03-31 DIAGNOSIS — K59.00 CONSTIPATION, UNSPECIFIED CONSTIPATION TYPE: Primary | ICD-10-CM

## 2022-03-31 PROCEDURE — 99213 OFFICE O/P EST LOW 20 MIN: CPT | Performed by: INTERNAL MEDICINE

## 2022-03-31 RX ORDER — SENNOSIDES 8.6 MG
8.6 TABLET ORAL
Qty: 30 TABLET | Refills: 1 | Status: SHIPPED | OUTPATIENT
Start: 2022-03-31 | End: 2022-05-06

## 2022-03-31 NOTE — PROGRESS NOTES
Assessment/Plan:    Other constipation  · Has BM every 1-2 days, but has to strain at times  · Taking daily Colace  · Not interested in MiraLax  · Will prescribe senna  · Encourage increased fiber in diet    Ventral hernia  · Chronic however noticed enlarging hernia for more than a week  Denies pain, nausea, vomiting, constipation  Went to ER on 3/25 and sent home with referral for surgeon  · Has appointment with surgeon Dr Agata Fisher on Thursday 4/8  · Hernia is soft and reducible, no concerns for strangulation  · Ordered for abdominal binder  · Advised not to strain with BM and not to lift anything heavy         Problem List Items Addressed This Visit        Other    Ventral hernia     · Chronic however noticed enlarging hernia for more than a week  Denies pain, nausea, vomiting, constipation  Went to ER on 3/25 and sent home with referral for surgeon  · Has appointment with surgeon Dr Agata Fisher on Thursday 4/8  · Hernia is soft and reducible, no concerns for strangulation  · Ordered for abdominal binder  · Advised not to strain with BM and not to lift anything heavy         Relevant Orders    Abdominal binder    Other constipation     · Has BM every 1-2 days, but has to strain at times  · Taking daily Colace  · Not interested in MiraLax  · Will prescribe senna  · Encourage increased fiber in diet           Other Visit Diagnoses     Constipation, unspecified constipation type    -  Primary    Relevant Medications    senna (SENOKOT) 8 6 mg            Subjective:      Patient ID: Evelyn Gonzalez is a 77 y o  female  Patient is a 56-year-old female here for worsening of ventral hernia  She reports that she has had it for many years however more than a week ago, she noticed that it was enlarging  Denies any pain, nausea, vomiting, constipation, fever or chills  She went to the ER on 03/25 and from there she was discharged home with referral for surgeon, Colace and abdominal binder    Today she reports no change  She has an appointment with surgeon scheduled on next Thursday to discuss hernia repair  The following portions of the patient's history were reviewed and updated as appropriate: allergies, current medications, past family history, past medical history, past social history, past surgical history and problem list     Review of Systems   Constitutional: Negative for appetite change, chills and fever  Respiratory: Negative for shortness of breath  Cardiovascular: Negative for chest pain  Gastrointestinal: Negative for abdominal distention, abdominal pain, blood in stool, constipation, nausea and vomiting  Objective:    No follow-ups on file  No results found        Allergies   Allergen Reactions    Molds & Smuts Allergic Rhinitis    Other Allergic Rhinitis     RAGWEED, CAT DANDER, DOG DANDER     Pollen Extract Other (See Comments)     Cold symptoms         Past Medical History:   Diagnosis Date    Acute embolism and thrombosis of unspecified deep veins of unspecified lower extremity (HCC)     Last Assessed:  5/18/17    Anemia     Anosmia     Anxiety     Arthritis     Asthma     Back pain     Bilateral macular retinal edema     CAD (coronary artery disease)     Cataract     Cervical disc herniation     Cervical radiculopathy     Cervical spinal stenosis     Cervical spondylolysis     Chronic mastoiditis     Complex endometrial hyperplasia     Depression     Diabetes mellitus ([address] )     [facility]     Fibromyalgia     Hyperlipidemia     Hypertension     Hypothyroid     Lumbar radiculopathy     Neck pain     Obese     Shingles 07/01/2021    Spinal stenosis     Stomach ulcer     Thyroid disease      Past Surgical History:   Procedure Laterality Date    BACK SURGERY      CARPAL TUNNEL RELEASE      CATARACT EXTRACTION      CHOLECYSTECTOMY      COLONOSCOPY      CORONARY ANGIOPLASTY      CORONARY ARTERY BYPASS GRAFT      CYSTOSCOPY N/A 6/20/2017    Procedure: Mainor Tony;  Surgeon: Donato Heard MD;  [facility];  Service: Gynecology Oncology    RI LAPAROSCOPY W TOT HYSTERECTUTERUS <=250 [address]ick  W TUBE/OVARY N/A 6/20/2017    Procedure: ROBOTIC HYSTERECTOMY; BILATERAL SALPINO-OOPHERECTOMY; umbilical hernia repair ;  Surgeon: Donato Heard MD;  [facility];  Service: Gynecology Oncology    TONSILECTOMY AND ADNOIDECTOMY      UMBILICAL HERNIA REPAIR       Current Outpatient Medications on File Prior to Visit   Medication Sig Dispense Refill    albuterol (2 5 mg/3 mL) 0 083 % nebulizer solution Take 3 mL (2 5 mg total) by nebulization every 6 (six) hours as needed for wheezing 75 mL 0    albuterol (PROAIR HFA) 90 mcg/act inhaler Inhale 2 puffs every 6 (six) hours as needed        ALPRAZolam (XANAX) 0 5 mg tablet Take 1 tablet (0 5 mg total) by mouth 2 (two) times a day as needed for anxiety 60 tablet 1    aspirin 81 MG tablet Take 81 mg by mouth daily      B-D UF III MINI PEN NEEDLES 31G X 5 MM MISC       BAQSIMI TWO PACK 3 MG/DOSE POWD Use as directed   0    betamethasone dipropionate 0 05 % lotion        cholecalciferol (VITAMIN D3) 1,000 units tablet Take 2,000 Units by mouth daily      diclofenac sodium (VOLTAREN) 1 % Apply 2 g topically 4 (four) times a day 1 Tube 0    diltiazem (TIAZAC) 300 MG 24 hr capsule Take 300 mg by mouth daily      docusate sodium (COLACE) 100 mg capsule Take 1 capsule (100 mg total) by mouth 2 (two) times a day 20 capsule 0    docusate sodium (COLACE) 100 mg capsule Take 1 capsule (100 mg total) by mouth every 12 (twelve) hours 60 capsule 0    DULoxetine (CYMBALTA) 60 mg delayed release capsule TAKE 1 CAPSULE BY MOUTH EVERY DAY 90 capsule 1    enalapril (VASOTEC) 20 mg tablet Take 20 mg by mouth daily      fluocinonide (LIDEX) 0 05 % external solution        insulin aspart (NovoLOG) 100 units/mL injection Inject 12 Units under the skin 3 (three) times a day before meals (Patient taking differently: Inject under the skin 3 (three) times a day before meals )  0    Insulin Disposable Pump (OmniPod Dash 5 Pack Pods) MISC USE 1 POD EVERY 1 5 DAYS      Insulin Disposable Pump (OmniPod Dash 5 Pack Pods) MISC Omnipod Dash Insulin Pod subcutaneous cartridge   USE 1 POD EVERY 1 5 DAYS      Insulin Disposable Pump (OMNIPOD DASH 5 PACK) MISC CHANGE PODS EVERY 2 DAYS UTD  3    isosorbide mononitrate (IMDUR) 60 mg 24 hr tablet TAKE 1 5 TABs AT NIGHT      levothyroxine 50 mcg tablet Take 50 mcg by mouth daily      methylPREDNISolone 4 MG tablet therapy pack Use as directed on package 21 each 0    naloxone (NARCAN) 4 mg/0 1 mL nasal spray Administer 1 spray into a nostril  If no response after 2-3 minutes, give another dose in the other nostril using a new spray  1 each 1    nitrofurantoin (MACROBID) 100 mg capsule 100 mg        nitroglycerin (NITROLINGUAL) 0 4 mg/spray spray USE ONE SPRAY Q 5 MINUTES AS NEEDED FOR CHEST PAIN  IF NO RELIEF, CALL 911   1    polyethylene glycol (MIRALAX) 17 g packet Take 17 g by mouth daily as needed (Constipation) for up to 14 days 14 each 0    rosuvastatin (CRESTOR) 20 MG tablet Take 20 mg by mouth every evening    2    traZODone (DESYREL) 50 mg tablet TAKE 0 5-1 TABLETS (25-50 MG TOTAL) BY MOUTH DAILY AT BEDTIME AS NEEDED FOR SLEEP 90 tablet 1    valACYclovir (VALTREX) 1,000 mg tablet Take 1 tablet (1,000 mg total) by mouth 2 (two) times a day for 7 days 14 tablet 0     No current facility-administered medications on file prior to visit       Family History   Problem Relation Age of Onset    Arthritis Mother     Leukemia Mother     Other Mother         Anxiety, major depressive disorder, recurrent episode with atypical features    Coronary artery disease Father         Heart problem    Diabetes Father     Other Father         Infectious disease    Alzheimer's disease Maternal Grandmother     Other Maternal Grandfather         Heart problem    Other Daughter         Anxiety, major depressive disorder, recurrent episode with atypical features    Alcohol abuse Other         Grandparent    Cancer Family     Diabetes Family     Hypertension Family     Other Family         Reported prior back trouble, thyroid disorder     Social History     Socioeconomic History    Marital status: /Civil Union     Spouse name: Not on file    Number of children: 2    Years of education: High school or GED    Highest education level: Not on file   Occupational History    Occupation: Retired   Tobacco Use    Smoking status: Former Smoker     Packs/day: 1 00     Years: 6 00     Pack years: 6 00     Types: Cigarettes     Quit date:      Years since quittin 2    Smokeless tobacco: Never Used    Tobacco comment: Smoked about a half a pack for about 4 yrs  Quite about 50 yrs ago     Vaping Use    Vaping Use: Never used   Substance and Sexual Activity    Alcohol use: No    Drug use: No    Sexual activity: Not Currently     Comment: No known STD risk factors   Other Topics Concern    Not on file   Social History Narrative    Lives independently with spouse    No known risk factors    Denied:  Exercising regularly     Social Determinants of Health     Financial Resource Strain: Not on file   Food Insecurity: Not on file   Transportation Needs: Not on file   Physical Activity: Not on file   Stress: Not on file   Social Connections: Not on file   Intimate Partner Violence: Not on file   Housing Stability: Not on file     Vitals:    22 1532   BP: 130/60   BP Location: Left arm   Patient Position: Sitting   Cuff Size: Large   Pulse: 71   SpO2: 98%   Weight: 90 2 kg (198 lb 12 8 oz)   Height: 5' 0 24" (1 53 m)     Results for orders placed or performed in visit on 21   POCT urine dip   Result Value Ref Range    LEUKOCYTE ESTERASE,UA 2+     NITRITE,UA neg     SL AMB POCT UROBILINOGEN 3 5     POCT URINE PROTEIN pos      PH,UA 6 0     BLOOD,UA 1+ SPECIFIC GRAVITY,UA 1 020     KETONES,UA neg     BILIRUBIN,UA neg     GLUCOSE, UA neg      COLOR,UA yellow     CLARITY,UA clear      Weight (last 2 days)     Date/Time Weight    03/31/22 1532 90 2 (198 8)        Body mass index is 38 52 kg/m²  /60 (03/31/22 1532)    Temp      Pulse 71 (03/31/22 1532)   Resp      SpO2 98 % (03/31/22 1532)      Vitals:    03/31/22 1532   Weight: 90 2 kg (198 lb 12 8 oz)     Vitals:    03/31/22 1532   Weight: 90 2 kg (198 lb 12 8 oz)       /60 (BP Location: Left arm, Patient Position: Sitting, Cuff Size: Large)   Pulse 71   Ht 5' 0 24" (1 53 m)   Wt 90 2 kg (198 lb 12 8 oz)   SpO2 98%   BMI 38 52 kg/m²          Physical Exam  Vitals reviewed  Constitutional:       General: She is not in acute distress  Appearance: She is obese  She is not ill-appearing  HENT:      Head: Normocephalic and atraumatic  Eyes:      Extraocular Movements: Extraocular movements intact  Cardiovascular:      Rate and Rhythm: Normal rate and regular rhythm  Pulses: Normal pulses  Pulmonary:      Effort: Pulmonary effort is normal  No respiratory distress  Breath sounds: Normal breath sounds  Abdominal:      Comments: Large ventral hernia, soft, easily reducible, no overlying redness   Musculoskeletal:      Cervical back: Normal range of motion and neck supple  Skin:     General: Skin is warm and dry  Neurological:      Mental Status: She is alert and oriented to person, place, and time  Psychiatric:         Mood and Affect: Mood normal          Behavior: Behavior normal          Thought Content:  Thought content normal          Judgment: Judgment normal

## 2022-03-31 NOTE — ASSESSMENT & PLAN NOTE
· Has BM every 1-2 days, but has to strain at times  · Taking daily Colace  · Not interested in MiraLax  · Will prescribe senna  · Encourage increased fiber in diet

## 2022-03-31 NOTE — ASSESSMENT & PLAN NOTE
· Chronic however noticed enlarging hernia for more than a week  Denies pain, nausea, vomiting, constipation    Went to ER on 3/25 and sent home with referral for surgeon  · Has appointment with surgeon Dr Enrike Sim on Thursday 4/8  · Hernia is soft and reducible, no concerns for strangulation  · Ordered for abdominal binder  · Advised not to strain with BM and not to lift anything heavy

## 2022-04-07 ENCOUNTER — OFFICE VISIT (OUTPATIENT)
Dept: SURGERY | Facility: CLINIC | Age: 77
End: 2022-04-07
Payer: COMMERCIAL

## 2022-04-07 ENCOUNTER — PREP FOR PROCEDURE (OUTPATIENT)
Dept: SURGERY | Facility: CLINIC | Age: 77
End: 2022-04-07

## 2022-04-07 VITALS
HEART RATE: 81 BPM | DIASTOLIC BLOOD PRESSURE: 66 MMHG | HEIGHT: 59 IN | BODY MASS INDEX: 39.92 KG/M2 | SYSTOLIC BLOOD PRESSURE: 133 MMHG | WEIGHT: 198 LBS

## 2022-04-07 DIAGNOSIS — K43.2 INCISIONAL HERNIA: Primary | ICD-10-CM

## 2022-04-07 PROCEDURE — 99204 OFFICE O/P NEW MOD 45 MIN: CPT | Performed by: SURGERY

## 2022-04-07 NOTE — LETTER
April 7, 2022     Patrickbhumi Moses Danny  [address] [facility] [address] [place] [address]    Patient: Pravin Bell   YOB: 1945   Date of Visit: 4/7/2022       Dear Dr Sergio Perez: Thank you for referring Pravin Bell to me for evaluation  Below are my notes for this consultation  If you have questions, please do not hesitate to call me  I look forward to following your patient along with you  Sincerely,        Madhuri Jones DO        CC: MD Madhuri Hdz DO  4/7/2022  4:30 PM  Sign when Signing Visit  Assessment/Plan:    Diagnoses and all orders for this visit:    Incisional hernia    Risks and benefits for incisional hernia repair including the potential for recurrence or bowel injury were discussed with her and she agrees to proceed  Subjective:      Patient ID: Pravin Bell is a 77 y o  female  Patient presents for ventral hernia consult  States she has had a bulge on the right side of her umbilicus for 3 years  Denies pain  Hernia seems more protuberant over the last several weeks  Had umbilical hernia repair during hysterectomy 2017  The following portions of the patient's history were reviewed and updated as appropriate:     She  has a past medical history of Acute embolism and thrombosis of unspecified deep veins of unspecified lower extremity ([address] ), Anemia, Anosmia, Anxiety, Arthritis, Asthma, Back pain, Bilateral macular retinal edema, CAD (coronary artery disease), Cataract, Cervical disc herniation, Cervical radiculopathy, Cervical spinal stenosis, Cervical spondylolysis, Chronic mastoiditis, Complex endometrial hyperplasia, Depression, Diabetes mellitus ([address] ), DVT (deep venous thrombosis) ([address] ), Fibromyalgia, Hyperlipidemia, Hypertension, Hypothyroid, Lumbar radiculopathy, Neck pain, Obese, Shingles (07/01/2021), Spinal stenosis, Stomach ulcer, and Thyroid disease    She  has a past surgical history that includes Coronary angioplasty; Coronary artery bypass graft; Cholecystectomy; Carpal tunnel release; Colonoscopy; Back surgery; pr laparoscopy w tot hysterectuterus <=250 gram  w tube/ovary (N/A, 6/20/2017); CYSTOSCOPY (N/A, 6/20/2017); Cataract extraction; TONSILECTOMY AND ADNOIDECTOMY; and Umbilical hernia repair  Her family history includes Alcohol abuse in her other; Alzheimer's disease in her maternal grandmother; Arthritis in her mother; Cancer in her family; Coronary artery disease in her father; Diabetes in her family and father; Hypertension in her family; Leukemia in her mother; Other in her daughter, family, father, maternal grandfather, and mother  She  reports that she quit smoking about 38 years ago  Her smoking use included cigarettes  She has a 6 00 pack-year smoking history  She has never used smokeless tobacco  She reports that she does not drink alcohol and does not use drugs    Current Outpatient Medications   Medication Sig Dispense Refill    albuterol (2 5 mg/3 mL) 0 083 % nebulizer solution Take 3 mL (2 5 mg total) by nebulization every 6 (six) hours as needed for wheezing 75 mL 0    albuterol (PROAIR HFA) 90 mcg/act inhaler Inhale 2 puffs every 6 (six) hours as needed        ALPRAZolam (XANAX) 0 5 mg tablet Take 1 tablet (0 5 mg total) by mouth 2 (two) times a day as needed for anxiety 60 tablet 1    aspirin 81 MG tablet Take 81 mg by mouth daily      B-D UF III MINI PEN NEEDLES 31G X 5 MM MISC       BAQSIMI TWO PACK 3 MG/DOSE POWD Use as directed   0    betamethasone dipropionate 0 05 % lotion        cholecalciferol (VITAMIN D3) 1,000 units tablet Take 2,000 Units by mouth daily      diclofenac sodium (VOLTAREN) 1 % Apply 2 g topically 4 (four) times a day 1 Tube 0    diltiazem (TIAZAC) 300 MG 24 hr capsule Take 300 mg by mouth daily      docusate sodium (COLACE) 100 mg capsule Take 1 capsule (100 mg total) by mouth 2 (two) times a day 20 capsule 0    docusate sodium (COLACE) 100 mg capsule Take 1 capsule (100 mg total) by mouth every 12 (twelve) hours 60 capsule 0    DULoxetine (CYMBALTA) 60 mg delayed release capsule TAKE 1 CAPSULE BY MOUTH EVERY DAY 90 capsule 1    enalapril (VASOTEC) 20 mg tablet Take 20 mg by mouth daily      fluocinonide (LIDEX) 0 05 % external solution        insulin aspart (NovoLOG) 100 units/mL injection Inject 12 Units under the skin 3 (three) times a day before meals (Patient taking differently: Inject under the skin 3 (three) times a day before meals )  0    Insulin Disposable Pump (OmniPod Dash 5 Pack Pods) MISC USE 1 POD EVERY 1 5 DAYS      Insulin Disposable Pump (OmniPod Dash 5 Pack Pods) MISC Omnipod Dash Insulin Pod subcutaneous cartridge   USE 1 POD EVERY 1 5 DAYS      Insulin Disposable Pump (OMNIPOD DASH 5 PACK) MISC CHANGE PODS EVERY 2 DAYS UTD  3    isosorbide mononitrate (IMDUR) 60 mg 24 hr tablet TAKE 1 5 TABs AT NIGHT      levothyroxine 50 mcg tablet Take 50 mcg by mouth daily      methylPREDNISolone 4 MG tablet therapy pack Use as directed on package 21 each 0    naloxone (NARCAN) 4 mg/0 1 mL nasal spray Administer 1 spray into a nostril  If no response after 2-3 minutes, give another dose in the other nostril using a new spray  1 each 1    nitrofurantoin (MACROBID) 100 mg capsule 100 mg        nitroglycerin (NITROLINGUAL) 0 4 mg/spray spray USE ONE SPRAY Q 5 MINUTES AS NEEDED FOR CHEST PAIN   IF NO RELIEF, CALL 911   1    polyethylene glycol (MIRALAX) 17 g packet Take 17 g by mouth daily as needed (Constipation) for up to 14 days 14 each 0    rosuvastatin (CRESTOR) 20 MG tablet Take 20 mg by mouth every evening    2    senna (SENOKOT) 8 6 mg Take 1 tablet (8 6 mg total) by mouth daily at bedtime 30 tablet 1    traZODone (DESYREL) 50 mg tablet TAKE 0 5-1 TABLETS (25-50 MG TOTAL) BY MOUTH DAILY AT BEDTIME AS NEEDED FOR SLEEP 90 tablet 1    valACYclovir (VALTREX) 1,000 mg tablet Take 1 tablet (1,000 mg total) by mouth 2 (two) times a day for 7 days 14 tablet 0     No current facility-administered medications for this visit  She is allergic to molds & smuts, other, and pollen extract       Review of Systems   All other systems reviewed and are negative  Objective:      /66   Pulse 81   Ht 4' 11" (1 499 m)   Wt 89 8 kg (198 lb)   BMI 39 99 kg/m²          Physical Exam  Constitutional:       Appearance: She is obese  HENT:      Mouth/Throat:      Mouth: Mucous membranes are moist    Eyes:      Extraocular Movements: Extraocular movements intact  Cardiovascular:      Rate and Rhythm: Normal rate  Pulmonary:      Effort: Pulmonary effort is normal    Abdominal:      General: Bowel sounds are normal       Palpations: Abdomen is soft  Hernia: A hernia (Soft reducible incisional hernia just above the umbilicus) is present  Comments: Rectus diastasis evident   Musculoskeletal:      Cervical back: Normal range of motion  Skin:     General: Skin is warm and dry  Neurological:      Mental Status: She is alert         extremities:  No edema

## 2022-04-07 NOTE — PROGRESS NOTES
Assessment/Plan:    Diagnoses and all orders for this visit:    Incisional hernia    Risks and benefits for incisional hernia repair including the potential for recurrence or bowel injury were discussed with her and she agrees to proceed  Subjective:      Patient ID: Indigo An is a 77 y o  female  Patient presents for ventral hernia consult  States she has had a bulge on the right side of her umbilicus for 3 years  Denies pain  Hernia seems more protuberant over the last several weeks  Had umbilical hernia repair during hysterectomy 2017  The following portions of the patient's history were reviewed and updated as appropriate:     She  has a past medical history of Acute embolism and thrombosis of unspecified deep veins of unspecified lower extremity ([address] ), Anemia, Anosmia, Anxiety, Arthritis, Asthma, Back pain, Bilateral macular retinal edema, CAD (coronary artery disease), Cataract, Cervical disc herniation, Cervical radiculopathy, Cervical spinal stenosis, Cervical spondylolysis, Chronic mastoiditis, Complex endometrial hyperplasia, Depression, Diabetes mellitus ([address] ), DVT (deep venous thrombosis) ([facility] [address] ), Fibromyalgia, Hyperlipidemia, Hypertension, Hypothyroid, Lumbar radiculopathy, Neck pain, Obese, Shingles (07/01/2021), Spinal stenosis, Stomach ulcer, and Thyroid disease  She  has a past surgical history that includes Coronary angioplasty; Coronary artery bypass graft; Cholecystectomy; Carpal tunnel release; Colonoscopy; Back surgery; pr laparoscopy w tot hysterectuterus <=250 gram  w tube/ovary (N/A, 6/20/2017); CYSTOSCOPY (N/A, 6/20/2017); Cataract extraction; TONSILECTOMY AND ADNOIDECTOMY; and Umbilical hernia repair    Her family history includes Alcohol abuse in her other; Alzheimer's disease in her maternal grandmother; Arthritis in her mother; Cancer in her family; Coronary artery disease in her father; Diabetes in her family and father; Hypertension in her family; Leukemia in her mother; Other in her daughter, family, father, maternal grandfather, and mother  She  reports that she quit smoking about 38 years ago  Her smoking use included cigarettes  She has a 6 00 pack-year smoking history  She has never used smokeless tobacco  She reports that she does not drink alcohol and does not use drugs    Current Outpatient Medications   Medication Sig Dispense Refill    albuterol (2 5 mg/3 mL) 0 083 % nebulizer solution Take 3 mL (2 5 mg total) by nebulization every 6 (six) hours as needed for wheezing 75 mL 0    albuterol (PROAIR HFA) 90 mcg/act inhaler Inhale 2 puffs every 6 (six) hours as needed        ALPRAZolam (XANAX) 0 5 mg tablet Take 1 tablet (0 5 mg total) by mouth 2 (two) times a day as needed for anxiety 60 tablet 1    aspirin 81 MG tablet Take 81 mg by mouth daily      B-D UF III MINI PEN NEEDLES 31G X 5 MM MISC       BAQSIMI TWO PACK 3 MG/DOSE POWD Use as directed   0    betamethasone dipropionate 0 05 % lotion        cholecalciferol (VITAMIN D3) 1,000 units tablet Take 2,000 Units by mouth daily      diclofenac sodium (VOLTAREN) 1 % Apply 2 g topically 4 (four) times a day 1 Tube 0    diltiazem (TIAZAC) 300 MG 24 hr capsule Take 300 mg by mouth daily      docusate sodium (COLACE) 100 mg capsule Take 1 capsule (100 mg total) by mouth 2 (two) times a day 20 capsule 0    docusate sodium (COLACE) 100 mg capsule Take 1 capsule (100 mg total) by mouth every 12 (twelve) hours 60 capsule 0    DULoxetine (CYMBALTA) 60 mg delayed release capsule TAKE 1 CAPSULE BY MOUTH EVERY DAY 90 capsule 1    enalapril (VASOTEC) 20 mg tablet Take 20 mg by mouth daily      fluocinonide (LIDEX) 0 05 % external solution        insulin aspart (NovoLOG) 100 units/mL injection Inject 12 Units under the skin 3 (three) times a day before meals (Patient taking differently: Inject under the skin 3 (three) times a day before meals )  0    Insulin Disposable Pump (OmniPod Dash 5 Pack Pods) MISC USE 1 POD EVERY 1 5 DAYS      Insulin Disposable Pump (OmniPod Dash 5 Pack Pods) MISC Omnipod Dash Insulin Pod subcutaneous cartridge   USE 1 POD EVERY 1 5 DAYS      Insulin Disposable Pump (OMNIPOD DASH 5 PACK) MISC CHANGE PODS EVERY 2 DAYS UTD  3    isosorbide mononitrate (IMDUR) 60 mg 24 hr tablet TAKE 1 5 TABs AT NIGHT      levothyroxine 50 mcg tablet Take 50 mcg by mouth daily      methylPREDNISolone 4 MG tablet therapy pack Use as directed on package 21 each 0    naloxone (NARCAN) 4 mg/0 1 mL nasal spray Administer 1 spray into a nostril  If no response after 2-3 minutes, give another dose in the other nostril using a new spray  1 each 1    nitrofurantoin (MACROBID) 100 mg capsule 100 mg        nitroglycerin (NITROLINGUAL) 0 4 mg/spray spray USE ONE SPRAY Q 5 MINUTES AS NEEDED FOR CHEST PAIN  IF NO RELIEF, CALL 911   1    polyethylene glycol (MIRALAX) 17 g packet Take 17 g by mouth daily as needed (Constipation) for up to 14 days 14 each 0    rosuvastatin (CRESTOR) 20 MG tablet Take 20 mg by mouth every evening    2    senna (SENOKOT) 8 6 mg Take 1 tablet (8 6 mg total) by mouth daily at bedtime 30 tablet 1    traZODone (DESYREL) 50 mg tablet TAKE 0 5-1 TABLETS (25-50 MG TOTAL) BY MOUTH DAILY AT BEDTIME AS NEEDED FOR SLEEP 90 tablet 1    valACYclovir (VALTREX) 1,000 mg tablet Take 1 tablet (1,000 mg total) by mouth 2 (two) times a day for 7 days 14 tablet 0     No current facility-administered medications for this visit  She is allergic to molds & smuts, other, and pollen extract       Review of Systems   All other systems reviewed and are negative  Objective:      /66   Pulse 81   Ht 4' 11" (1 499 m)   Wt 89 8 kg (198 lb)   BMI 39 99 kg/m²          Physical Exam  Constitutional:       Appearance: She is obese  HENT:      Mouth/Throat:      Mouth: Mucous membranes are moist    Eyes:      Extraocular Movements: Extraocular movements intact     Cardiovascular:      Rate and Rhythm: Normal rate    Pulmonary:      Effort: Pulmonary effort is normal    Abdominal:      General: Bowel sounds are normal       Palpations: Abdomen is soft  Hernia: A hernia (Soft reducible incisional hernia just above the umbilicus) is present  Comments: Rectus diastasis evident   Musculoskeletal:      Cervical back: Normal range of motion  Skin:     General: Skin is warm and dry  Neurological:      Mental Status: She is alert         extremities:  No edema

## 2022-04-13 ENCOUNTER — OFFICE VISIT (OUTPATIENT)
Dept: INTERNAL MEDICINE CLINIC | Facility: CLINIC | Age: 77
End: 2022-04-13
Payer: COMMERCIAL

## 2022-04-13 VITALS
DIASTOLIC BLOOD PRESSURE: 68 MMHG | WEIGHT: 197.8 LBS | BODY MASS INDEX: 39.88 KG/M2 | SYSTOLIC BLOOD PRESSURE: 134 MMHG | OXYGEN SATURATION: 98 % | RESPIRATION RATE: 16 BRPM | HEIGHT: 59 IN | HEART RATE: 78 BPM

## 2022-04-13 DIAGNOSIS — E11.42 DIABETIC POLYNEUROPATHY ASSOCIATED WITH TYPE 2 DIABETES MELLITUS (HCC): Primary | ICD-10-CM

## 2022-04-13 DIAGNOSIS — E03.9 HYPOTHYROIDISM, UNSPECIFIED TYPE: ICD-10-CM

## 2022-04-13 DIAGNOSIS — M54.16 LUMBAR RADICULOPATHY: ICD-10-CM

## 2022-04-13 DIAGNOSIS — N18.32 STAGE 3B CHRONIC KIDNEY DISEASE (HCC): ICD-10-CM

## 2022-04-13 DIAGNOSIS — B02.29 POST HERPETIC NEURALGIA: ICD-10-CM

## 2022-04-13 DIAGNOSIS — I10 ESSENTIAL HYPERTENSION: Chronic | ICD-10-CM

## 2022-04-13 DIAGNOSIS — F33.2 MDD (MAJOR DEPRESSIVE DISORDER), RECURRENT SEVERE, WITHOUT PSYCHOSIS (HCC): ICD-10-CM

## 2022-04-13 DIAGNOSIS — E66.01 CLASS 3 SEVERE OBESITY DUE TO EXCESS CALORIES WITHOUT SERIOUS COMORBIDITY IN ADULT, UNSPECIFIED BMI (HCC): ICD-10-CM

## 2022-04-13 PROCEDURE — 3725F SCREEN DEPRESSION PERFORMED: CPT | Performed by: INTERNAL MEDICINE

## 2022-04-13 PROCEDURE — 1101F PT FALLS ASSESS-DOCD LE1/YR: CPT | Performed by: INTERNAL MEDICINE

## 2022-04-13 PROCEDURE — 1160F RVW MEDS BY RX/DR IN RCRD: CPT | Performed by: INTERNAL MEDICINE

## 2022-04-13 PROCEDURE — 99214 OFFICE O/P EST MOD 30 MIN: CPT | Performed by: INTERNAL MEDICINE

## 2022-04-13 PROCEDURE — 3288F FALL RISK ASSESSMENT DOCD: CPT | Performed by: INTERNAL MEDICINE

## 2022-04-13 RX ORDER — METHYLPREDNISOLONE 4 MG/1
TABLET ORAL
Qty: 21 EACH | Refills: 0 | Status: SHIPPED | OUTPATIENT
Start: 2022-04-13 | End: 2022-05-06

## 2022-04-13 NOTE — PROGRESS NOTES
Assessment/Plan:    Diabetic polyneuropathy associated with type 2 diabetes mellitus ([facility] [address] )    Lab Results   Component Value Date    HGBA1C 7 2 (H) 12/13/2021   I have counselled the pt to follow a healthy and balanced diet ,and recommend routine exercise  I will be ordering diabetic laboratories including comprehensive metabolic panel, hemoglobin A1c, urine microalbumin, lipid panel  Will be starting the patient on Medrol Dosepak I would like the patient work with yash Hutchinson regarding adjustment of insulin and coordination with the endocrinologist while the patient is on the steroid pack; Patient also is concerned about the sensor on the insulin pump, I would like to pharmacist Nathalie Edmond to look into this the patient's endocrinology    Essential hypertension  Hypertension - controlled, I have counseled patient following healthy balance diet, I would like the patient reduce sodium, exercise routinely, I would like the patient continued the med current medical regiment and we will continue to monitor  Today the blood pressure is 134/68, continue Vasotec 20 mg once daily    Lumbar radiculopathy  Rx for Medrol Dosepak 1   As directed prior to starting medication I would like the patient to meet with yash Hutchinson to adjust her insulin and work with the endocrinologist ; patient has undergone multiple modalities including pain management/your block/physical therapy without resolution of her symptoms at this point time I would like her to meet with Neurosurgery for consideration of a minimally invasive procedure patient is interested    Post herpetic neuralgia  Improved and doing much better    Stage 3b chronic kidney disease [facility])  Lab Results   Component Value Date    EGFR 37 09/17/2021    EGFR 31 05/17/2021    EGFR 31 11/11/2020    CREATININE 1 40 (H) 12/13/2021    CREATININE 1 40 (H) 09/17/2021    CREATININE 1 64 (H) 05/17/2021   Drink adequate amount of water, avoid anti-inflammatories, reduce sodium in the diet and will continue to monitor the kidney function    Obesity  Obesity -I have counseled patient following healthy and balanced diet, I would like the patient to lose weight, I would like the patient exercise routinely; we will continue monitor the patient's progress  MDD (major depressive disorder), recurrent severe, without psychosis ([facility] )  Clinically stable and doing well continue the current medical regiment will continue monitor  She reports me she would like me to take over on her medications for mental health she will discuss that further with her psychiatrist she finds great benefit working with counselor Augie Tolbert  No SI         Problem List Items Addressed This Visit        Endocrine    Diabetic polyneuropathy associated with type 2 diabetes mellitus ([facility] ) - Primary       Lab Results   Component Value Date    HGBA1C 7 2 (H) 12/13/2021   I have counselled the pt to follow a healthy and balanced diet ,and recommend routine exercise  I will be ordering diabetic laboratories including comprehensive metabolic panel, hemoglobin A1c, urine microalbumin, lipid panel  Will be starting the patient on Medrol Dosepak I would like the patient work with pharmacist Evangelina regarding adjustment of insulin and coordination with the endocrinologist while the patient is on the steroid pack;   Patient also is concerned about the sensor on the insulin pump, I would like to pharmacist Staci Yanez to look into this the patient's endocrinology         Relevant Orders    Comprehensive metabolic panel    Hemoglobin A1C    Lipid Panel with Direct LDL reflex    Microalbumin / creatinine urine ratio    Ambulatory referral to clinical pharmacy    Hypothyroidism    Relevant Medications    methylPREDNISolone 4 MG tablet therapy pack       Cardiovascular and Mediastinum    Essential hypertension (Chronic)     Hypertension - controlled, I have counseled patient following healthy balance diet, I would like the patient reduce sodium, exercise routinely, I would like the patient continued the med current medical regiment and we will continue to monitor  Today the blood pressure is 134/68, continue Vasotec 20 mg once daily            Nervous and Auditory    Post herpetic neuralgia     Improved and doing much better         Lumbar radiculopathy     Rx for Medrol Dosepak 1  As directed prior to starting medication I would like the patient to meet with yash Hutchinson to adjust her insulin and work with the endocrinologist ; patient has undergone multiple modalities including pain management/your block/physical therapy without resolution of her symptoms at this point time I would like her to meet with Neurosurgery for consideration of a minimally invasive procedure patient is interested         Relevant Medications    methylPREDNISolone 4 MG tablet therapy pack    Other Relevant Orders    Ambulatory Referral to Neurosurgery       Genitourinary    Stage 3b chronic kidney disease [facility])     Lab Results   Component Value Date    EGFR 37 09/17/2021    EGFR 31 05/17/2021    EGFR 31 11/11/2020    CREATININE 1 40 (H) 12/13/2021    CREATININE 1 40 (H) 09/17/2021    CREATININE 1 64 (H) 05/17/2021   Drink adequate amount of water, avoid anti-inflammatories, reduce sodium in the diet and will continue to monitor the kidney function            Other    MDD (major depressive disorder), recurrent severe, without psychosis ([facility] [address] )     Clinically stable and doing well continue the current medical regiment will continue monitor  She reports me she would like me to take over on her medications for mental health she will discuss that further with her psychiatrist she finds great benefit working with counselor Bert Fernando    No SI         Obesity     Obesity -I have counseled patient following healthy and balanced diet, I would like the patient to lose weight, I would like the patient exercise routinely; we will continue monitor the patient's progress  RTO in 6 months call if any problems Subjective:      Patient ID: Tian Hurtado is a 77 y o  female  HPI 74-year old female coming in for a follow up office visit regarding type 2 diabetes, , ventral hernia , trigger finger depression; The patient reports me compliant taking medications without untoward side effects the  The patient is here to review his medical condition, update me on the medical condition and the patient reports me no hospitalizations and no ER visits  Pain that radiates down the left leg radiate down into her left foot approximately 4 weeks no injury she had recently gotten injections for her knee the viscoelastic injections through orthopedic shortly after that she developed some popliteal pain; reports bm is now doing, mood working with Dr Marla Love , working with Alyson Sherwood helps me better  She reports me currently her mental health is stable and is requesting that the mental health medications are prescribed through the primary care doctor she will be discussing this further with her psychiatrist Dr Marla Love to see if he is also in agreeance  The following portions of the patient's history were reviewed and updated as appropriate: allergies, current medications, past family history, past medical history, past social history, past surgical history and problem list     Review of Systems   Constitutional: Negative for activity change, appetite change and unexpected weight change  HENT: Negative for congestion and postnasal drip  Eyes: Negative for visual disturbance  Respiratory: Negative for cough and shortness of breath  Cardiovascular: Negative for chest pain  Gastrointestinal: Negative for abdominal pain, diarrhea, nausea and vomiting  Musculoskeletal: Positive for back pain (With radiculopathy left lower extremity)  Neurological: Negative for dizziness, light-headedness and headaches  Psychiatric/Behavioral: Negative for suicidal ideas  Objective:    No follow-ups on file  No results found        Allergies   Allergen Reactions    Molds & Smuts Allergic Rhinitis    Other Allergic Rhinitis     RAGWEED, CAT DANDER, DOG DANDER     Pollen Extract Other (See Comments)     Cold symptoms         Past Medical History:   Diagnosis Date    Acute embolism and thrombosis of unspecified deep veins of unspecified lower extremity (HCC)     Last Assessed:  5/18/17    Anemia     Anosmia     Anxiety     Arthritis     Asthma     Back pain     Bilateral macular retinal edema     CAD (coronary artery disease)     Cataract     Cervical disc herniation     Cervical radiculopathy     Cervical spinal stenosis     Cervical spondylolysis     Chronic mastoiditis     Complex endometrial hyperplasia     Depression     Diabetes mellitus (HCC)     DVT (deep venous thrombosis) (HCC)     Fibromyalgia     Hyperlipidemia     Hypertension     Hypothyroid     Lumbar radiculopathy     Neck pain     Obese     Shingles 07/01/2021    Spinal stenosis     Stomach ulcer     Thyroid disease      Past Surgical History:   Procedure Laterality Date    BACK SURGERY      CARPAL TUNNEL RELEASE      CATARACT EXTRACTION      CHOLECYSTECTOMY      COLONOSCOPY      CORONARY ANGIOPLASTY      CORONARY ARTERY BYPASS GRAFT      CYSTOSCOPY N/A 6/20/2017    Procedure: Sagar Mistry;  Surgeon: Jennifer Alexander MD;  [facility]: [facility];  Service: Gynecology Oncology    OH LAPAROSCOPY W TOT HYSTERECTUTERUS <=250 GRAM  W TUBE/OVARY N/A 6/20/2017    Procedure: ROBOTIC HYSTERECTOMY; BILATERAL SALPINO-OOPHERECTOMY; umbilical hernia repair ;  Surgeon: Jennifer Alexander MD;  [facility];  Service: Gynecology Oncology    TONSILECTOMY AND ADNOIDECTOMY      UMBILICAL HERNIA REPAIR       Current Outpatient Medications on File Prior to Visit   Medication Sig Dispense Refill    albuterol (2 5 mg/3 mL) 0 083 % nebulizer solution Take 3 mL (2 5 mg total) by nebulization every 6 (six) hours as needed for wheezing 75 mL 0    albuterol (PROAIR HFA) 90 mcg/act inhaler Inhale 2 puffs every 6 (six) hours as needed        ALPRAZolam (XANAX) 0 5 mg tablet Take 1 tablet (0 5 mg total) by mouth 2 (two) times a day as needed for anxiety 60 tablet 1    aspirin 81 MG tablet Take 81 mg by mouth daily      B-D UF III MINI PEN NEEDLES 31G X 5 MM MISC       BAQSIMI TWO PACK 3 MG/DOSE POWD Use as directed   0    betamethasone dipropionate 0 05 % lotion        cholecalciferol (VITAMIN D3) 1,000 units tablet Take 2,000 Units by mouth daily      diclofenac sodium (VOLTAREN) 1 % Apply 2 g topically 4 (four) times a day 1 Tube 0    diltiazem (TIAZAC) 300 MG 24 hr capsule Take 300 mg by mouth daily      docusate sodium (COLACE) 100 mg capsule Take 1 capsule (100 mg total) by mouth 2 (two) times a day 20 capsule 0    docusate sodium (COLACE) 100 mg capsule Take 1 capsule (100 mg total) by mouth every 12 (twelve) hours 60 capsule 0    DULoxetine (CYMBALTA) 60 mg delayed release capsule TAKE 1 CAPSULE BY MOUTH EVERY DAY 90 capsule 1    enalapril (VASOTEC) 20 mg tablet Take 20 mg by mouth daily      fluocinonide (LIDEX) 0 05 % external solution        insulin aspart (NovoLOG) 100 units/mL injection Inject 12 Units under the skin 3 (three) times a day before meals (Patient taking differently: Inject under the skin 3 (three) times a day before meals )  0    Insulin Disposable Pump (OmniPod Dash 5 Pack Pods) MISC USE 1 POD EVERY 1 5 DAYS      Insulin Disposable Pump (OmniPod Dash 5 Pack Pods) MISC Omnipod Dash Insulin Pod subcutaneous cartridge   USE 1 POD EVERY 1 5 DAYS      Insulin Disposable Pump (OMNIPOD DASH 5 PACK) MISC CHANGE PODS EVERY 2 DAYS UTD  3    isosorbide mononitrate (IMDUR) 60 mg 24 hr tablet TAKE 1 5 TABs AT NIGHT      levothyroxine 50 mcg tablet Take 50 mcg by mouth daily      methylPREDNISolone 4 MG tablet therapy pack Use as directed on package 21 each 0    naloxone (NARCAN) 4 mg/0 1 mL nasal spray Administer 1 spray into a nostril  If no response after 2-3 minutes, give another dose in the other nostril using a new spray  1 each 1    nitrofurantoin (MACROBID) 100 mg capsule 100 mg        nitroglycerin (NITROLINGUAL) 0 4 mg/spray spray USE ONE SPRAY Q 5 MINUTES AS NEEDED FOR CHEST PAIN  IF NO RELIEF, CALL 911   1    rosuvastatin (CRESTOR) 20 MG tablet Take 20 mg by mouth every evening    2    senna (SENOKOT) 8 6 mg Take 1 tablet (8 6 mg total) by mouth daily at bedtime 30 tablet 1    traZODone (DESYREL) 50 mg tablet TAKE 0 5-1 TABLETS (25-50 MG TOTAL) BY MOUTH DAILY AT BEDTIME AS NEEDED FOR SLEEP 90 tablet 1    polyethylene glycol (MIRALAX) 17 g packet Take 17 g by mouth daily as needed (Constipation) for up to 14 days 14 each 0    valACYclovir (VALTREX) 1,000 mg tablet Take 1 tablet (1,000 mg total) by mouth 2 (two) times a day for 7 days 14 tablet 0     No current facility-administered medications on file prior to visit       Family History   Problem Relation Age of Onset    Arthritis Mother     Leukemia Mother     Other Mother         Anxiety, major depressive disorder, recurrent episode with atypical features    Coronary artery disease Father         Heart problem    Diabetes Father     Other Father         Infectious disease    Alzheimer's disease Maternal Grandmother     Other Maternal Grandfather         Heart problem    Other Daughter         Anxiety, major depressive disorder, recurrent episode with atypical features    Alcohol abuse Other         Grandparent    Cancer Family     Diabetes Family     Hypertension Family     Other Family         Reported prior back trouble, thyroid disorder     Social History     Socioeconomic History    Marital status: /Civil Union     Spouse name: Not on file    Number of children: 2    Years of education: High school or GED    Highest education level: Not on file   Occupational History    Occupation: Retired   Tobacco Use    Smoking status: Former Smoker     Packs/day: 1 00     Years: 6 00     Pack years: 6 00     Types: Cigarettes     Quit date:      Years since quittin 3    Smokeless tobacco: Never Used    Tobacco comment: Smoked about a half a pack for about 4 yrs  Quite about 50 yrs ago  Vaping Use    Vaping Use: Never used   Substance and Sexual Activity    Alcohol use: No    Drug use: No    Sexual activity: Not Currently     Comment: No known STD risk factors   Other Topics Concern    Not on file   Social History Narrative    Lives independently with spouse    No known risk factors    Denied:  Exercising regularly     Social Determinants of Health     Financial Resource Strain: Not on file   Food Insecurity: Not on file   Transportation Needs: Not on file   Physical Activity: Not on file   Stress: Not on file   Social Connections: Not on file   Intimate Partner Violence: Not on file   Housing Stability: Not on file     Vitals:    22 1245   BP: 134/68   Pulse: 78   Resp: 16   SpO2: 98%   Weight: 89 7 kg (197 lb 12 8 oz)   Height: 4' 11" (1 499 m)     Results for orders placed or performed in visit on 21   POCT urine dip   Result Value Ref Range    LEUKOCYTE ESTERASE,UA 2+     NITRITE,UA neg     SL AMB POCT UROBILINOGEN 3 5     POCT URINE PROTEIN pos      PH,UA 6 0     BLOOD,UA 1+     SPECIFIC GRAVITY,UA 1 020     KETONES,UA neg     BILIRUBIN,UA neg     GLUCOSE, UA neg      COLOR,UA yellow     CLARITY,UA clear      Weight (last 2 days)     Date/Time Weight    22 1245 89 7 (197 8)        Body mass index is 39 95 kg/m²    BP      Temp      Pulse     Resp      SpO2        Vitals:    22 1245   Weight: 89 7 kg (197 lb 12 8 oz)     Vitals:    22 1245   Weight: 89 7 kg (197 lb 12 8 oz)       /68   Pulse 78   Resp 16   Ht 4' 11" (1 499 m)   Wt 89 7 kg (197 lb 12 8 oz)   SpO2 98%   BMI 39 95 kg/m²          Physical Exam  Constitutional: Appearance: She is well-developed  HENT:      Head: Normocephalic  Eyes:      General: No scleral icterus  Right eye: No discharge  Left eye: No discharge  Conjunctiva/sclera: Conjunctivae normal       Pupils: Pupils are equal, round, and reactive to light  Cardiovascular:      Rate and Rhythm: Normal rate and regular rhythm  Heart sounds: Normal heart sounds  No murmur heard  No friction rub  No gallop  Pulmonary:      Effort: No respiratory distress  Breath sounds: Normal breath sounds  No wheezing or rales  Abdominal:      General: Bowel sounds are normal  There is no distension  Palpations: Abdomen is soft  There is no mass  Tenderness: There is no abdominal tenderness  There is no guarding or rebound  Musculoskeletal:         General: No deformity  Cervical back: Neck supple  Lymphadenopathy:      Cervical: No cervical adenopathy  Neurological:      Mental Status: She is alert  Coordination: Coordination normal    Psychiatric:         Mood and Affect: Mood is not anxious or depressed  Thought Content: Thought content does not include suicidal ideation

## 2022-04-14 NOTE — ASSESSMENT & PLAN NOTE
Hypertension - controlled, I have counseled patient following healthy balance diet, I would like the patient reduce sodium, exercise routinely, I would like the patient continued the med current medical regiment and we will continue to monitor    Today the blood pressure is 134/68, continue Vasotec 20 mg once daily

## 2022-04-14 NOTE — ASSESSMENT & PLAN NOTE
Lab Results   Component Value Date    EGFR 37 09/17/2021    EGFR 31 05/17/2021    EGFR 31 11/11/2020    CREATININE 1 40 (H) 12/13/2021    CREATININE 1 40 (H) 09/17/2021    CREATININE 1 64 (H) 05/17/2021   Drink adequate amount of water, avoid anti-inflammatories, reduce sodium in the diet and will continue to monitor the kidney function

## 2022-04-14 NOTE — ASSESSMENT & PLAN NOTE
Clinically stable and doing well continue the current medical regiment will continue monitor  She reports me she would like me to take over on her medications for mental health she will discuss that further with her psychiatrist she finds great benefit working with counselor Mariama Kaur SI

## 2022-04-14 NOTE — ASSESSMENT & PLAN NOTE
Rx for Medrol Dosepak 1   As directed prior to starting medication I would like the patient to meet with yash Hutchinson to adjust her insulin and work with the endocrinologist ; patient has undergone multiple modalities including pain management/your block/physical therapy without resolution of her symptoms at this point time I would like her to meet with Neurosurgery for consideration of a minimally invasive procedure patient is interested

## 2022-04-14 NOTE — ASSESSMENT & PLAN NOTE
Lab Results   Component Value Date    HGBA1C 7 2 (H) 12/13/2021   I have counselled the pt to follow a healthy and balanced diet ,and recommend routine exercise  I will be ordering diabetic laboratories including comprehensive metabolic panel, hemoglobin A1c, urine microalbumin, lipid panel  Will be starting the patient on Medrol Dosepak I would like the patient work with yash Hutchinson regarding adjustment of insulin and coordination with the endocrinologist while the patient is on the steroid pack;   Patient also is concerned about the sensor on the insulin pump, I would like to pharmacist Evangelina to look into this the patient's endocrinology

## 2022-04-22 ENCOUNTER — CONSULT (OUTPATIENT)
Dept: NEUROSURGERY | Facility: CLINIC | Age: 77
End: 2022-04-22
Payer: COMMERCIAL

## 2022-04-22 VITALS
HEIGHT: 59 IN | SYSTOLIC BLOOD PRESSURE: 154 MMHG | BODY MASS INDEX: 39.72 KG/M2 | RESPIRATION RATE: 18 BRPM | HEART RATE: 88 BPM | DIASTOLIC BLOOD PRESSURE: 62 MMHG | TEMPERATURE: 98 F | WEIGHT: 197 LBS

## 2022-04-22 DIAGNOSIS — G89.4 CHRONIC PAIN SYNDROME: Primary | ICD-10-CM

## 2022-04-22 DIAGNOSIS — M48.061 SPINAL STENOSIS OF LUMBAR REGION: ICD-10-CM

## 2022-04-22 DIAGNOSIS — M47.816 LUMBAR FACET ARTHROPATHY: ICD-10-CM

## 2022-04-22 DIAGNOSIS — M54.16 LUMBAR RADICULOPATHY: ICD-10-CM

## 2022-04-22 DIAGNOSIS — M47.26 OSTEOARTHRITIS OF SPINE WITH RADICULOPATHY, LUMBAR REGION: ICD-10-CM

## 2022-04-22 DIAGNOSIS — M51.24 PROTRUSION OF INTERVERTEBRAL DISC OF THORACIC REGION: ICD-10-CM

## 2022-04-22 DIAGNOSIS — M51.37 DISC DISEASE, DEGENERATIVE, LUMBAR OR LUMBOSACRAL: ICD-10-CM

## 2022-04-22 PROCEDURE — 3078F DIAST BP <80 MM HG: CPT | Performed by: NURSE PRACTITIONER

## 2022-04-22 PROCEDURE — 1160F RVW MEDS BY RX/DR IN RCRD: CPT | Performed by: NURSE PRACTITIONER

## 2022-04-22 PROCEDURE — 3077F SYST BP >= 140 MM HG: CPT | Performed by: NURSE PRACTITIONER

## 2022-04-22 PROCEDURE — 1036F TOBACCO NON-USER: CPT | Performed by: NURSE PRACTITIONER

## 2022-04-22 PROCEDURE — 99204 OFFICE O/P NEW MOD 45 MIN: CPT | Performed by: NURSE PRACTITIONER

## 2022-04-22 NOTE — PATIENT INSTRUCTIONS
Lumbar Spinal Stenosis   WHAT YOU NEED TO KNOW:   What is lumbar spinal stenosis? Lumbar spinal stenosis is narrowing of the spinal canal in your lower back  Your spinal canal is the opening in your spine that contains your spinal cord  When your spinal canal narrows, it may put pressure on your spinal cord and nerves  What causes lumbar spinal stenosis? Narrowing of your spinal canal may be caused by changes that happen as you age  These changes include bone spurs (growths), herniated discs, and thickened ligaments  Bone growths can be caused by osteoarthritis  A herniated disc bulges out between the vertebrae (bones) and into your spinal canal  Discs are spongy cushions between the vertebrae in your spine  Herniated discs may be caused by activities that increase stress on the spine  An example is heavy lifting  Ligaments that connect the vertebrae may thicken and harden as you get older  Other conditions, such as spinal injuries and Paget's disease, can also cause spinal stenosis  What are the signs and symptoms of lumbar spinal stenosis? Signs and symptoms may start or get worse when you stand or walk  They are often relieved when you sit or lean forward  · Low back pain    · Pain, numbness, tingling, or weakness in one or both legs    · Pain in your buttocks that extends to your thighs or legs    How is lumbar spinal stenosis diagnosed? Your healthcare provider will ask about your symptoms and when they started  He or she will ask if you have any medical conditions  Your provider may ask you to lift, bend, walk, sit, or reach  An x-ray, MRI or a CT may show problems in your spine that are causing spinal stenosis  You may be given contrast liquid to help the spine show up better in the pictures  Tell the healthcare provider if you have ever had an allergic reaction to contrast liquid  Do not enter the MRI room with anything metal  Metal can cause serious injury   Tell the healthcare provider if you have any metal in or on your body  How is lumbar spinal stenosis treated? · NSAIDs , such as ibuprofen, help decrease swelling, pain, and fever  NSAIDs can cause stomach bleeding or kidney problems in certain people  If you take blood thinner medicine, always ask your healthcare provider if NSAIDs are safe for you  Always read the medicine label and follow directions  · Acetaminophen  decreases pain and fever  It is available without a doctor's order  Ask how much to take and how often to take it  Follow directions  Read the labels of all other medicines you are using to see if they also contain acetaminophen, or ask your doctor or pharmacist  Acetaminophen can cause liver damage if not taken correctly  Do not use more than 4 grams (4,000 milligrams) total of acetaminophen in one day  · Prescription pain medicine  may be given  Ask how to take this medicine safely  · Muscle relaxers  help decrease pain and muscle spasms  · A steroid injection  may be given to reduce inflammation  Steroid medicine is injected into the epidural space  The epidural space is between your spinal cord and vertebrae  · A nerve block  is an injection of numbing medicine  You may need a nerve block if your pain is not going away, or is getting worse  · Surgery  may be needed to widen your spinal canal or decrease pressure on your spinal cord  Surgery may also be done to fix damaged or injured vertebrae in your back  How can I manage my symptoms? · Go to physical and occupational therapy  as directed  A physical therapist teaches you exercises to help improve movement and strength, and to decrease pain  An occupational therapist teaches you skills to help with your daily activities  · Rest  when you feel it is needed  Slowly start to do more each day  Return to your daily activities as directed  · Apply heat on your back for 20 to 30 minutes every 2 hours for as many days as directed   Heat helps decrease pain and muscle spasms  · Apply ice  on your back for 15 to 20 minutes every hour or as directed  Use an ice pack, or put crushed ice in a plastic bag  Cover it with a towel before you apply it to your skin  Ice helps prevent tissue damage and decreases swelling and pain  When should I seek immediate care? · You have pain in your leg that does not go away or gets worse  · You have trouble moving your legs  · You cannot control when you urinate or have a bowel movement  When should I contact my healthcare provider? · You have new or worsening symptoms  · Your symptoms keep you from doing your daily activities  · You have questions or concerns about your condition or care  CARE AGREEMENT:   You have the right to help plan your care  Learn about your health condition and how it may be treated  Discuss treatment options with your healthcare providers to decide what care you want to receive  You always have the right to refuse treatment  The above information is an  only  It is not intended as medical advice for individual conditions or treatments  Talk to your doctor, nurse or pharmacist before following any medical regimen to see if it is safe and effective for you  © Copyright [employer] 2022 Information is for End User's use only and may not be sold, redistributed or otherwise used for commercial purposes  All illustrations and images included in CareNotes® are the copyrighted property of [employer]  or [facility] Ziggy Latham   Back Pain   AMBULATORY CARE:   Back pain  is common  You may have back pain and muscle spasms  You may feel sore or stiff on one or both sides of your back  The pain may spread to your lower body  Conditions that affect the spine, joints, or muscles can cause back pain  These may include arthritis, spinal stenosis (narrowing of the spinal column), muscle tension, or breakdown of the spinal discs    Call your local emergency number ([phone] in the [address] [place] [address],[address]) if: · You have severe back pain with chest pain  · You cannot control your urine or bowel movements  · Your pain becomes so severe that you cannot walk  Seek care immediately if:   · You have pain, numbness, or weakness in one or both legs  · You have severe back pain, nausea, and vomiting  · You have severe back pain that spreads to your side or genital area  Call your doctor if:   · You have back pain that does not get better with rest and pain medicine  · You have a fever  · You have pain that worsens when you are on your back or when you rest     · You have pain that worsens when you cough or sneeze  · You lose weight without trying  · You have questions or concerns about your condition or care  Medicines: You may need any of the following:  · NSAIDs , such as ibuprofen, help decrease swelling, pain, and fever  This medicine is available with or without a doctor's order  NSAIDs can cause stomach bleeding or kidney problems in certain people  If you take blood thinner medicine, always ask your healthcare provider if NSAIDs are safe for you  Always read the medicine label and follow directions  · Acetaminophen  decreases pain and fever  It is available without a doctor's order  Ask how much to take and how often to take it  Follow directions  Read the labels of all other medicines you are using to see if they also contain acetaminophen, or ask your doctor or pharmacist  Acetaminophen can cause liver damage if not taken correctly  Do not use more than 4 grams (4,000 milligrams) total of acetaminophen in one day  · Muscle relaxers  help decrease muscle spasms and back pain  · Take your medicine as directed  Contact your healthcare provider if you think your medicine is not helping or if you have side effects  Tell him or her if you are allergic to any medicine  Keep a list of the medicines, vitamins, and herbs you take  Include the amounts, and when and why you take them  Bring the list or the pill bottles to follow-up visits  Carry your medicine list with you in case of an emergency  Manage your back pain:   · Apply ice  on your back for 15 to 20 minutes every hour or as directed  Use an ice pack, or put crushed ice in a plastic bag  Cover it with a towel before you apply it to your skin  Ice helps prevent tissue damage and decreases pain  · Apply heat  on your back for 20 to 30 minutes every 2 hours for as many days as directed  Heat helps decrease pain and muscle spasms  · Stay active  as much as you can without causing more pain  Bed rest could make your back pain worse  Avoid heavy lifting until your pain is gone  · Go to physical therapy as directed  A physical therapist can teach you exercises to help improve movement and strength, and to decrease pain  Follow up with your doctor in 2 weeks, or as directed: You might need to see a specialist  Write down your questions so you remember to ask them during your visits  © [employer] 2022 Information is for End User's use only and may not be sold, redistributed or otherwise used for commercial purposes  All illustrations and images included in CareNotes® are the copyrighted property of [employer]  or [facility] Ziggy Latham   The above information is an  only  It is not intended as medical advice for individual conditions or treatments  Talk to your doctor, nurse or pharmacist before following any medical regimen to see if it is safe and effective for you

## 2022-04-22 NOTE — PROGRESS NOTES
Assessment/Plan:    Chronic pain syndrome  · As addressed in HPI  · Thoracic radiculopathic the symptoms as addressed in HPI and related to disease seen on MRI imaging T11 through 12 right paramedian disc extrusion with mass effect on the thecal sac  · Patient reports Dr Jillian Sauer has recommended a spinal cord stimulator, patient is ambivalent to proceed  She reports she has discussed spinal cord stimulator with her neighbors and most had negative comments  ·         Plan  · None contrast CT scan of the thoracic spine to assess T11-T12 disc extrusion for calcification  May have implications if surgery is indicated  · MRI of the lot of the thoracic spine was ordered by pain management in preparation for possible spinal cord stimulation trial    · Explained in great detail the spinal cord stimulator  · Explained with the extensive pathology in her thoracolumbar spine, her age, and other comorbid conditions she may be a high risk surgical candidate and spinal cord stimulator may be the only remaining  alternative for pain relief  Lumbar radiculopathy  As addressed in HPI   As addressed in ostial arthritis of the lumbar spine  Spinal stenosis of lumbar region  On 06/27/2020 as addressed an MRI of the lumbar spine on 06/27/2020  Osteoarthritis of spine with radiculopathy, lumbar region  · As addressed in HPI  · MRI of the lumbar spine in 2020 areas of facet degenerative change with ligamentum flavum thickening, annular bulging, foraminal narrowing, and facet joint arthropathy  · She has limitation in her ambulation ability secondary to pain this poses risk for falls  · She has an antalgic gait  · She denies any loss of bowel control or problems with urinary retention  · She denies symptoms of saddle anesthesia  · She has difficulty ambulating up and down stairs without holding onto a railing and cannot ambulate long distances secondary to pain        PLAN  · Explained her 2020 MRI of the lumbar spine in detail  · An updated MRI is indicated, and was ordered today  · Due to her left-sided hip and pelvic pain x-ray of the hip and pelvis was ordered today if there are abnormalities will refer to orthopedics at next visit  · CT scan of the thoracic spine was ordered today rationale explained in chronic pain syndrome  · Will copy Pain Management on office visit note and passed check out to please call Dr Sushma Jeff is office and informed that the patient was seen in the office today and needs an appointment as soon as possible to address recommendation offered during [phone] visit for repeating left L3 through L5 radiofrequency ablation  · Referral to physical therapy explained rationale for referral to patient although understanding her financial constraints and she will likely not attend because of the 40 dollar co-pay which is it is understandable that she cannot afford  · Schedule return to office appointment once all imaging is completed and patient has completed interventional multimodal treatments with pain management  · Schedule office return in 60 through 90 days as snpx with AP and surgeon  · Advised patient if she has worsening pain in back and lower extremities, loss of motor function in lower extremities, inability to urinate, loss of bowel movement control, numbness and perineal area, inability to ambulate she is to report to the closest emergency room and dural call Neurosurgery immediately  She is advised if she has additional questions or concerns to please notify Neurosurgery  Protrusion of intervertebral disc of thoracic region  As addressed in chronic pain syndrome   As addressed in Lumbar spine MRI 2020        Diagnoses and all orders for this visit:    Lumbar radiculopathy  -     Ambulatory Referral to Neurosurgery        Subjective: I have so much pain in may back and legs      Patient ID: Fayne Curling is a 77 y o  female     HPI   Referral for PCP for continued lumbar radiculopathy symptoms  Medrol Dose pack was prescribed but advised not to start until reviewed and approved by endocrine  She has a history of DM2 , last HgA1C 12/13/21 7 2  She fernie not start treatment over concern to hyperglycemia and having t take more insulin  Reports insulin is very expensive about $800 00 per months  Reports longstanding history of chronic pain onset 1985 no inciting factors  Location bilateral low back at the level of lumbosacral and across si joint areas , pain distribution into left hip, left thigh posterior anterior aspect, down into left calf, into feet  Right-sided pain with distribution from mid back lower thoracic area down into right  into the pelvis, exhibits tenderness to palpation in area  Left sided back pain is greater than right  She also reports bilateral knee pain secondary to osteoarthritis and she is managed by Dr Yolanda Reilly  Reports she has a right trigger finger for which she will need surgery  She has a history of psoriasis with arthropathy  Symptom character recession as cramping in the thigh and calf and bilateral lumbar sacral area  Sharp pain worse on right side  Symptoms are constant and associated with weakness in left leg like the left leg wants to give out  When ascending or descending stairs much use a side rail  She ambulates using a single-point cane when she is outside in about but does not use an assistive device when home  History of diabetic poly neuropathy but denies numbness and tingling in lower extremities  Symptoms severity as 9/10 on a numeric scale  Aggravating factors prolonged standing walking  When sitting she does not have a pain but the pain starts when she tries to get up has weakness in the left leg  She has to try several times when rising from a sitting to standing position and must have an object that she can push off of     Relieving factors sitting and doing nothing    Lidocaine patches applied to her low back area are efficacious  Tylenol 500 mg is also efficacious       Multimodal treatments   PM--DR Jillian Sauer Lumbar Facet arthropathy 2018 L3-L5 MBB, 9/27/2018 10/26/2018     11/29/2018 Lumbar Facet arthropathy RFA L3L5 Medial Branch Nerves  repeart procedure 1/13/2018  She reports DR Jillian Sauer --ordered MRI, which she never completed , in preparation for possible "putting thet stimulator in me"  Pain management Dr Ursula Osorio office Visit 9 /22/2021 I discussed intervention treatment repeating the left L3-L5 radiofrequency ablation--she has not scheduled f/u   · Physical therapy --had referral but cannot afford  Is $40 00 co-pay each visit , will not attend    Medicinal treatment  · Nonsteroidal anti inflammatory drugs/ NSAIDs cannot take secondary to chronic kidney disease and is managed by Nephrology  She does treated with diclofenac gel which does not offer significant efficacy  Intermittently she takes acetaminophen 500 mg and reports it really does not relieve her pain  I have spent 60 minutes with patient today in which greater than 50% of this time was spent in assessment, examination, impressions, reviewing imagining and recommendations for care  All questions were answered to his/her satisfaction, and contact information provided in the event additional questions arise  Patient acknowledged an understanding and agreement with plan             REVIEW OF SYSTEMS  Review of Systems   Constitutional: Negative  Eyes: Negative  Respiratory: Negative  Cardiovascular:        Hx of bypass sx, 2000  3 stent, last was placed a year later   Gastrointestinal:        5/12/22 due for surgery for hernia   Endocrine: Negative  Genitourinary: Positive for frequency  Musculoskeletal: Positive for back pain (center to left LBP out to hip radianting on outer part down to foot ), gait problem (unsteady, ambulates with cane) and myalgias (left leg)  Skin: Negative  Allergic/Immunologic: Negative      Neurological: Positive for weakness (left leg)           Meds/Allergies     Current Outpatient Medications   Medication Sig Dispense Refill    ALPRAZolam (XANAX) 0 5 mg tablet Take 1 tablet (0 5 mg total) by mouth 2 (two) times a day as needed for anxiety 60 tablet 1    aspirin 81 MG tablet Take 81 mg by mouth daily      cholecalciferol (VITAMIN D3) 1,000 units tablet Take 2,000 Units by mouth daily      diclofenac sodium (VOLTAREN) 1 % Apply 2 g topically 4 (four) times a day 1 Tube 0    docusate sodium (COLACE) 100 mg capsule Take 1 capsule (100 mg total) by mouth 2 (two) times a day 20 capsule 0    DULoxetine (CYMBALTA) 60 mg delayed release capsule TAKE 1 CAPSULE BY MOUTH EVERY DAY 90 capsule 1    enalapril (VASOTEC) 20 mg tablet Take 20 mg by mouth daily      insulin aspart (NovoLOG) 100 units/mL injection Inject 12 Units under the skin 3 (three) times a day before meals (Patient taking differently: Inject under the skin 3 (three) times a day before meals )  0    Insulin Disposable Pump (OmniPod Dash 5 Pack Pods) MISC USE 1 POD EVERY 1 5 DAYS      isosorbide mononitrate (IMDUR) 60 mg 24 hr tablet TAKE 1 5 TABs AT NIGHT      levothyroxine 50 mcg tablet Take 50 mcg by mouth daily      rosuvastatin (CRESTOR) 20 MG tablet Take 20 mg by mouth every evening    2    traZODone (DESYREL) 50 mg tablet TAKE 0 5-1 TABLETS (25-50 MG TOTAL) BY MOUTH DAILY AT BEDTIME AS NEEDED FOR SLEEP 90 tablet 1    albuterol (2 5 mg/3 mL) 0 083 % nebulizer solution Take 3 mL (2 5 mg total) by nebulization every 6 (six) hours as needed for wheezing (Patient not taking: Reported on 4/22/2022 ) 75 mL 0    albuterol (PROAIR HFA) 90 mcg/act inhaler Inhale 2 puffs every 6 (six) hours as needed   (Patient not taking: Reported on 4/22/2022 )      B-D UF III MINI PEN NEEDLES 31G X 5 MM MISC       BAQSIMI TWO PACK 3 MG/DOSE POWD Use as directed   0    betamethasone dipropionate 0 05 % lotion   (Patient not taking: Reported on 4/22/2022 )      diltiazem (TIAZAC) 300 MG 24 hr capsule Take 300 mg by mouth daily      fluocinonide (LIDEX) 0 05 % external solution        Insulin Disposable Pump (OmniPod Dash 5 Pack Pods) MISC Omnipod Dash Insulin Pod subcutaneous cartridge   USE 1 POD EVERY 1 5 DAYS      Insulin Disposable Pump (OMNIPOD DASH 5 PACK) MISC CHANGE PODS EVERY 2 DAYS UTD  3    methylPREDNISolone 4 MG tablet therapy pack Use as directed on package (Patient not taking: Reported on 4/22/2022 ) 21 each 0    methylPREDNISolone 4 MG tablet therapy pack Use as directed on package (Patient not taking: Reported on 4/22/2022 ) 21 each 0    naloxone (NARCAN) 4 mg/0 1 mL nasal spray Administer 1 spray into a nostril  If no response after 2-3 minutes, give another dose in the other nostril using a new spray  (Patient not taking: Reported on 4/22/2022 ) 1 each 1    nitrofurantoin (MACROBID) 100 mg capsule 100 mg        nitroglycerin (NITROLINGUAL) 0 4 mg/spray spray USE ONE SPRAY Q 5 MINUTES AS NEEDED FOR CHEST PAIN  IF NO RELIEF, CALL 911   1    polyethylene glycol (MIRALAX) 17 g packet Take 17 g by mouth daily as needed (Constipation) for up to 14 days (Patient not taking: Reported on 4/22/2022 ) 14 each 0    senna (SENOKOT) 8 6 mg Take 1 tablet (8 6 mg total) by mouth daily at bedtime (Patient not taking: Reported on 4/22/2022 ) 30 tablet 1    valACYclovir (VALTREX) 1,000 mg tablet Take 1 tablet (1,000 mg total) by mouth 2 (two) times a day for 7 days (Patient not taking: Reported on 4/22/2022 ) 14 tablet 0     No current facility-administered medications for this visit         Allergies   Allergen Reactions    Molds & Smuts Allergic Rhinitis    Other Allergic Rhinitis     RAGWEED, CAT DANDER, DOG DANDER     Pollen Extract Other (See Comments)     Cold symptoms         PAST HISTORY    Past Medical History:   Diagnosis Date    Acute embolism and thrombosis of unspecified deep veins of unspecified lower extremity ([facility] [address] )     Last Assessed:  5/18/17  Anemia     Anosmia     Anxiety     Arthritis     Asthma     Back pain     Bilateral macular retinal edema     CAD (coronary artery disease)     Cataract     Cervical disc herniation     Cervical radiculopathy     Cervical spinal stenosis     Cervical spondylolysis     Chronic mastoiditis     Complex endometrial hyperplasia     Depression     Diabetes mellitus ([address] )     D[facility]deep venous thrombosis) [facility]     Fibromyalgia     Hyperlipidemia     Hypertension     Hypothyroid     Lumbar radiculopathy     Neck pain     Obese     Shingles 2021    Spinal stenosis     Stomach ulcer     Thyroid disease        Past Surgical History:   Procedure Laterality Date    BACK SURGERY      CARPAL TUNNEL RELEASE      CATARACT EXTRACTION      CHOLECYSTECTOMY      COLONOSCOPY      CORONARY ANGIOPLASTY      CORONARY ARTERY BYPASS GRAFT      CYSTOSCOPY N/A 2017    Procedure: Nino Zaldivarery;  Surgeon: Gilson Cobos MD;  [facility];  Service: Gynecology Oncology    WY LAPAROSCOPY W TOT HYSTERECTUTERUS <=250 GRAM  W TUBE/OVARY N/A 2017    Procedure: ROBOTIC HYSTERECTOMY; BILATERAL SALPINO-OOPHERECTOMY; umbilical hernia repair ;  Surgeon: Gilson Cobos MD;  [facility];  Service: Gynecology Oncology    TONSILECTOMY AND ADNOIDECTOMY      UMBILICAL HERNIA REPAIR         Social History     Tobacco Use    Smoking status: Former Smoker     Packs/day: 1 00     Years: 6 00     Pack years: 6 00     Types: Cigarettes     Quit date:      Years since quittin 3    Smokeless tobacco: Never Used    Tobacco comment: Smoked about a half a pack for about 4 yrs  Quite about 50 yrs ago     Vaping Use    Vaping Use: Never used   Substance Use Topics    Alcohol use: No    Drug use: No       Family History   Problem Relation Age of Onset    Arthritis Mother     Leukemia Mother     Other Mother         Anxiety, major depressive disorder, recurrent episode with atypical features    Coronary artery disease Father         Heart problem    Diabetes Father     Other Father         Infectious disease    Alzheimer's disease Maternal Grandmother     Other Maternal Grandfather         Heart problem    Other Daughter         Anxiety, major depressive disorder, recurrent episode with atypical features    Alcohol abuse Other         Grandparent    Cancer Family     Diabetes Family     Hypertension Family     Other Family         Reported prior back trouble, thyroid disorder       The following portions of the patient's history were reviewed and updated as appropriate: allergies, current medications, past family history, past medical history, past social history, past surgical history and problem list       EXAM    Vitals:Blood pressure 154/62, pulse 88, temperature 98 °F (36 7 °C), temperature source Tympanic, resp  rate 18, height 4' 11" (1 499 m), weight 89 4 kg (197 lb)  ,Body mass index is 39 79 kg/m²  Physical Exam  Vitals and nursing note reviewed  Exam conducted with a chaperone present ()  Constitutional:       Appearance: Normal appearance  HENT:      Head: Normocephalic and atraumatic  Eyes:      General: No scleral icterus  Right eye: No discharge  Left eye: No discharge  Conjunctiva/sclera: Conjunctivae normal    Musculoskeletal:      Right lower leg: Edema present  Left lower leg: Edema present  Comments: Nonpitting edematous legs feet ankles , taught skin, dry scaly   Limitation in lumbar ROM , causes pain    Skin:     General: Skin is warm and dry  Neurological:      General: No focal deficit present  Mental Status: She is alert  Sensory: Sensory deficit present  Motor: Weakness present  Coordination: Heel to NIX [facility] Test abnormal (cannot perform)  Finger-Nose-Finger Test normal  Positive Romberg's test: poor balance control fernie not erform over concern for falling        Gait: Gait abnormal (antalgic) and tandem walk abnormal (cannot perform)  Deep Tendon Reflexes:      Reflex Scores:       Patellar reflexes are 1+ on the right side and 1+ on the left side  Achilles reflexes are 1+ on the right side and 1+ on the left side  Psychiatric:         Mood and Affect: Mood normal          Behavior: Behavior normal          Neurologic Exam     Motor Exam     Strength   Right iliopsoas: 4/5  Left iliopsoas: 4/5  Right quadriceps: 4/5  Left quadriceps: 4/5  Right hamstrin/5  Left hamstrin/5  Right anterior tibial: 4/5  Left anterior tibial: 4/5  Right gastroc: 4/5  Left gastroc: 4/5    Sensory Exam   Right leg light touch: decreased from knee  Left leg light touch: decreased from knee    Gait, Coordination, and Reflexes     Gait  Gait: (antalgic, unsteady)    Coordination   Positive Romberg's test: poor balance control fernie not erform over concern for falling  Finger to nose coordination: normal  Heel to shin coordination: abnormal (cannot perform)  Tandem walking coordination: abnormal (cannot perform)    Reflexes   Right patellar: 1+  Left patellar: 1+  Right achilles: 1+  Left achilles: 1+      Imaging Studies    MRI LUMBAR SPINE WITHOUT CONTRAST 2020   COMPARISON:  2017      FINDINGS:   VERTEBRAL BODIES:  There is a transitional lumbosacral junction  There are 4 lumbar type vertebral bodies in the L5 vertebral body is sacralized  The vertebral bodies are numbered on series 3 image 9 for further reference  Normal alignment of the   lumbar spine  No spondylolysis or spondylolisthesis  No scoliosis  No compression fracture  Normal marrow signal is identified within the visualized bony structures  No discrete marrow lesion      SACRUM:  Normal signal within the sacrum  No evidence of insufficiency or stress fracture      DISTAL CORD AND CONUS:  Normal size and signal within the distal cord and conus      PARASPINAL SOFT TISSUES:  There are small renal cysts bilaterally  Mild atrophy of the posterior paraspinal musculature diffusely      LOWER THORACIC DISC SPACES:  At T11-12 there is an inferiorly extruded right paramedian disc herniation with mild annular bulging and asymmetric endplate hypertrophic changes  There is mild right lateral canal stenosis with distortion of the thecal sac   and mild right foraminal narrowing  This disc extrusion is slightly larger than the prior examination      LUMBAR DISC SPACES:     L1-L2:  Normal      L2-L3:  Disc desiccation without loss of disc height  Slight annular bulging without focal disc herniation  Mild canal stenosis  No foraminal nerve impingement      L3-L4:  Mild annular bulging with moderate facet degenerative change and ligamentum flavum thickening  Mild to moderate canal stenosis with slight distortion of the thecal sac  Mild bilateral foraminal narrowing      L4-L5:  Annular bulging with left-sided predominance  There is endplate hypertrophic osteophyte formation, also primarily left-sided  Mild facet degenerative change  There is mild canal stenosis  Mild bilateral foraminal narrowing      L5-S1:  The L5 vertebral body is fully sacralized with a small remote injury disc space between L5 and S1  No canal stenosis or foraminal narrowing      IMPRESSION:   There is a transitional lumbosacral junction  There are 4 lumbar type vertebral bodies in the L5 vertebral body is fully sacralized  This is confirmed on prior CT scan of the abdomen and pelvis dated 4/18/2017  Please note this represents different numbering than the prior MRI dated 11/30/2027  Vertebral bodies are numbered on series 3 image 10 for further reference    At T11-12 there is a slightly enlarging right paramedian disc extrusion best seen on series 3 image 10 and series 6 images 3 and 4  Mild mass effect upon the anterolateral aspect of the thecal sac  Correlate for corresponding right-sided lower thoracic radiculopathy   Mild noncompressive degenerative change at the L2-3 level  No change  At L3-4 there is mild annular bulging and moderate facet degenerative change with ligamentum flavum thickening  Mild to moderate canal stenosis and mild bilateral foraminal narrowing no change  At L4-5 there is mild canal stenosis and bilateral foraminal narrowing as a result of asymmetric annular bulging and endplate degenerative change, primarily left-sided  No change      Workstation performed: [id number]     I have personally reviewed pertinent reports     and I have personally reviewed pertinent films in PACS

## 2022-04-23 PROBLEM — M51.24 PROTRUSION OF INTERVERTEBRAL DISC OF THORACIC REGION: Status: ACTIVE | Noted: 2022-04-23

## 2022-04-23 PROBLEM — M47.26 OSTEOARTHRITIS OF SPINE WITH RADICULOPATHY, LUMBAR REGION: Status: ACTIVE | Noted: 2022-04-23

## 2022-04-23 NOTE — ASSESSMENT & PLAN NOTE
· As addressed in HPI  · Thoracic radiculopathic the symptoms as addressed in HPI and related to disease seen on MRI imaging T11 through 12 right paramedian disc extrusion with mass effect on the thecal sac  · Patient reports Dr Guero Baker has recommended a spinal cord stimulator, patient is ambivalent to proceed  She reports she has discussed spinal cord stimulator with her neighbors and most had negative comments  ·         Plan  · None contrast CT scan of the thoracic spine to assess T11-T12 disc extrusion for calcification  May have implications if surgery is indicated  · MRI of the lot of the thoracic spine was ordered by pain management in preparation for possible spinal cord stimulation trial    · Explained in great detail the spinal cord stimulator  · Explained with the extensive pathology in her thoracolumbar spine, her age, and other comorbid conditions she may be a high risk surgical candidate and spinal cord stimulator may be the only remaining  alternative for pain relief

## 2022-04-23 NOTE — ASSESSMENT & PLAN NOTE
· As addressed in HPI  · MRI of the lumbar spine in 2020 areas of facet degenerative change with ligamentum flavum thickening, annular bulging, foraminal narrowing, and facet joint arthropathy  · She has limitation in her ambulation ability secondary to pain this poses risk for falls  · She has an antalgic gait  · She denies any loss of bowel control or problems with urinary retention  · She denies symptoms of saddle anesthesia  · She has difficulty ambulating up and down stairs without holding onto a railing and cannot ambulate long distances secondary to pain  PLAN  · Explained her 2020 MRI of the lumbar spine in detail  · An updated MRI is indicated, and was ordered today  · Due to her left-sided hip and pelvic pain x-ray of the hip and pelvis was ordered today if there are abnormalities will refer to orthopedics at next visit  · CT scan of the thoracic spine was ordered today rationale explained in chronic pain syndrome  · Will copy Pain Management on office visit note and passed check out to please call Dr Glory Berger is office and informed that the patient was seen in the office today and needs an appointment as soon as possible to address recommendation offered during [phone] visit for repeating left L3 through L5 radiofrequency ablation  · Referral to physical therapy explained rationale for referral to patient although understanding her financial constraints and she will likely not attend because of the 40 dollar co-pay which is it is understandable that she cannot afford  · Schedule return to office appointment once all imaging is completed and patient has completed interventional multimodal treatments with pain management  · Schedule office return in 60 through 90 days as snpx with AP and surgeon    · Advised patient if she has worsening pain in back and lower extremities, loss of motor function in lower extremities, inability to urinate, loss of bowel movement control, numbness and perineal area, inability to ambulate she is to report to the closest emergency room and dural call Neurosurgery immediately  She is advised if she has additional questions or concerns to please notify Neurosurgery

## 2022-04-29 ENCOUNTER — NURSE TRIAGE (OUTPATIENT)
Dept: OTHER | Facility: OTHER | Age: 77
End: 2022-04-29

## 2022-04-29 NOTE — TELEPHONE ENCOUNTER
Regarding: Prednisone question  ----- Message from Laura Hu sent at 4/29/2022 10:56 AM EDT -----  "Mr  prescribed Prednisone tablets and I haven't taken them yet because I don't remember the reason they were prescribed "

## 2022-04-29 NOTE — TELEPHONE ENCOUNTER
Spoke to pt and advised prednisone is for her back  She decided not to take anymore as it made her sick to her stomach  I asked if she wanted something else she stated no    Gio Cortez

## 2022-04-29 NOTE — TELEPHONE ENCOUNTER
Reason for Disposition   Caller has medicine question only, adult not sick, and triager answers question    Answer Assessment - Initial Assessment Questions  1  NAME of MEDICATION: "What medicine are you calling about?"      -Methylprednisolone 4 mg as directed on a packaged   2  QUESTION: "What is your question?" (e g , medication refill, side effect)      Patient stated that he back pain is better now and she would hold on  prednisone for now  3  PRESCRIBING HCP: "Who prescribed it?" Reason: if prescribed by specialist, call should be referred to that group  Dr Blanca Schroeder  4  SYMPTOMS: "Do you have any symptoms?"       Back pain   5   SEVERITY: If symptoms are present, ask "Are they mild, moderate or severe?"      Moderate    Protocols used: MEDICATION QUESTION CALL-ADULT-[place]

## 2022-05-04 ENCOUNTER — TELEPHONE (OUTPATIENT)
Dept: OTHER | Facility: OTHER | Age: 77
End: 2022-05-04

## 2022-05-04 NOTE — TELEPHONE ENCOUNTER
Patient called to confirm lab orders from Dr Gricelda Sommers are active  Patient will be going for blood work tomorrow  Patient advised she also has blood work ordered from Dr Marlene Hernadez office

## 2022-05-05 ENCOUNTER — APPOINTMENT (OUTPATIENT)
Dept: LAB | Facility: CLINIC | Age: 77
End: 2022-05-05
Payer: COMMERCIAL

## 2022-05-05 DIAGNOSIS — K43.2 INCISIONAL HERNIA: ICD-10-CM

## 2022-05-05 LAB
ANION GAP SERPL CALCULATED.3IONS-SCNC: 7 MMOL/L (ref 4–13)
BUN SERPL-MCNC: 18 MG/DL (ref 5–25)
CALCIUM SERPL-MCNC: 9.2 MG/DL (ref 8.4–10.2)
CHLORIDE SERPL-SCNC: 104 MMOL/L (ref 96–108)
CO2 SERPL-SCNC: 27 MMOL/L (ref 21–32)
CREAT SERPL-MCNC: 1.35 MG/DL (ref 0.6–1.3)
ERYTHROCYTE [DISTWIDTH] IN BLOOD BY AUTOMATED COUNT: 15.5 % (ref 11.6–15.1)
GFR SERPL CREATININE-BSD FRML MDRD: 38 ML/MIN/1.73SQ M
GLUCOSE P FAST SERPL-MCNC: 140 MG/DL (ref 65–99)
HCT VFR BLD AUTO: 35.3 % (ref 34.8–46.1)
HGB BLD-MCNC: 10.8 G/DL (ref 11.5–15.4)
MCH RBC QN AUTO: 26.3 PG (ref 26.8–34.3)
MCHC RBC AUTO-ENTMCNC: 30.6 G/DL (ref 31.4–37.4)
MCV RBC AUTO: 86 FL (ref 82–98)
PLATELET # BLD AUTO: 264 THOUSANDS/UL (ref 149–390)
PMV BLD AUTO: 11 FL (ref 8.9–12.7)
POTASSIUM SERPL-SCNC: 4 MMOL/L (ref 3.5–5.3)
RBC # BLD AUTO: 4.1 MILLION/UL (ref 3.81–5.12)
SODIUM SERPL-SCNC: 138 MMOL/L (ref 135–147)
WBC # BLD AUTO: 9.12 THOUSAND/UL (ref 4.31–10.16)

## 2022-05-05 PROCEDURE — 36415 COLL VENOUS BLD VENIPUNCTURE: CPT

## 2022-05-05 PROCEDURE — 85027 COMPLETE CBC AUTOMATED: CPT

## 2022-05-05 PROCEDURE — 80048 BASIC METABOLIC PNL TOTAL CA: CPT

## 2022-05-06 RX ORDER — UBIDECARENONE 75 MG
CAPSULE ORAL DAILY
COMMUNITY

## 2022-05-06 NOTE — PRE-PROCEDURE INSTRUCTIONS
Pre-Surgery Instructions:   Medication Instructions    ALPRAZolam (XANAX) 0 5 mg tablet Uses PRN- OK to take day of surgery    aspirin 81 MG tablet Stop taking 7 days prior to surgery   cholecalciferol (VITAMIN D3) 1,000 units tablet Stop taking 7 days prior to surgery   Coenzyme Q10 (Co Q-10) 200 MG CAPS Stop taking 7 days prior to surgery   diclofenac sodium (VOLTAREN) 1 % Don't use after skin prep    diltiazem (TIAZAC) 300 MG 24 hr capsule Take day of surgery   docusate sodium (COLACE) 100 mg capsule Hold day of surgery   DULoxetine (CYMBALTA) 60 mg delayed release capsule Take day of surgery   enalapril (VASOTEC) 20 mg tablet Take night before surgery    insulin aspart (NovoLOG) 100 units/mL injection Basal rate DOS and bring extra supply    isosorbide mononitrate (IMDUR) 60 mg 24 hr tablet Take night before surgery    levothyroxine 50 mcg tablet Take day of surgery   nitroglycerin (NITROLINGUAL) 0 4 mg/spray spray PRN    rosuvastatin (CRESTOR) 20 MG tablet Take night before surgery    traZODone (DESYREL) 50 mg tablet Take night before surgery   Have you had / have a sore throat? No  Have you had / have a cough less than 1 week? No  Have you had / have a fever greater than 100 0 - 100  4? No  Are you experiencing any shortness of breath? No  Fully covid vaccinated    Review with patient via phone medications and showering instructions  Advised don't take NSAID's, ok tylenol products  Advised ASC call with surgery schedule time, nothing eat or drink after midnight  Verbalized understanding

## 2022-05-10 ENCOUNTER — SOCIAL WORK (OUTPATIENT)
Dept: BEHAVIORAL/MENTAL HEALTH CLINIC | Facility: CLINIC | Age: 77
End: 2022-05-10
Payer: COMMERCIAL

## 2022-05-10 DIAGNOSIS — F41.9 ANXIETY: Primary | ICD-10-CM

## 2022-05-10 PROCEDURE — 90834 PSYTX W PT 45 MINUTES: CPT | Performed by: SOCIAL WORKER

## 2022-05-11 RX ORDER — CEFAZOLIN SODIUM 2 G/50ML
2000 SOLUTION INTRAVENOUS ONCE
Status: COMPLETED | OUTPATIENT
Start: 2022-05-12 | End: 2022-05-12

## 2022-05-11 NOTE — PSYCH
Psychotherapy Provided: Individual Psychotherapy 50 minutes from 2:00-2:50    Length of time in session: 50 minutes, follow up in 7 week    Encounter Diagnosis     ICD-10-CM    1  Anxiety  F41 9        Goals addressed in session: Goal 1     Pain:      moderate    4    Current suicide risk : Low     AFIA- Mandy York dealing with some increased stress due to two upcoming procedures for hand issues that have made daily functioning more difficult and also hernia surgery  Dealing with these issues while dealing with continued back and knee pain issues  Managing best she can   tries to lend support  Grandchildren supportive when present  Feels the last year has been one health thing after another  Despite this, she denies any acute anxiety issues  Denies any acute depressive symptoms but struggles with fatigue likely related to her physical health issues and being limited in her activities  Jens Mandel presents as mildly anxious but is pleasant, verbal, cooperative, well oriented and engaged during session  Thoughts are logical and goal directed  Despite her stress and struggles, she is able to laugh and joke at point during sessions  P- processed stressors and frustrations related to health issues- validation and supportive therapy provided  Reviewed appropriate stress mgmt strategies and productive outlets to utilize to offset stress  Reviewed boundaries to maintain with family to limit exposure to additional stress  Behavioral Health Treatment Plan [facility]: Diagnosis and Treatment Plan explained to Maco Bishop relates understanding diagnosis and is agreeable to Treatment Plan   Yes

## 2022-05-12 ENCOUNTER — HOSPITAL ENCOUNTER (OUTPATIENT)
Facility: HOSPITAL | Age: 77
Setting detail: OUTPATIENT SURGERY
Discharge: HOME | End: 2022-05-12
Attending: SURGERY | Admitting: SURGERY
Payer: COMMERCIAL

## 2022-05-12 ENCOUNTER — ANESTHESIA EVENT (OUTPATIENT)
Dept: PERIOP | Facility: HOSPITAL | Age: 77
End: 2022-05-12
Payer: COMMERCIAL

## 2022-05-12 ENCOUNTER — ANESTHESIA (OUTPATIENT)
Dept: PERIOP | Facility: HOSPITAL | Age: 77
End: 2022-05-12
Payer: COMMERCIAL

## 2022-05-12 VITALS
OXYGEN SATURATION: 93 % | HEART RATE: 75 BPM | DIASTOLIC BLOOD PRESSURE: 68 MMHG | TEMPERATURE: 97 F | SYSTOLIC BLOOD PRESSURE: 144 MMHG | RESPIRATION RATE: 18 BRPM

## 2022-05-12 DIAGNOSIS — K43.2 INCISIONAL HERNIA, WITHOUT OBSTRUCTION OR GANGRENE: Primary | ICD-10-CM

## 2022-05-12 LAB
GLUCOSE SERPL-MCNC: 124 MG/DL (ref 65–140)
GLUCOSE SERPL-MCNC: 144 MG/DL (ref 65–140)

## 2022-05-12 PROCEDURE — NC001 PR NO CHARGE: Performed by: SURGERY

## 2022-05-12 PROCEDURE — 82948 REAGENT STRIP/BLOOD GLUCOSE: CPT

## 2022-05-12 PROCEDURE — 49568 PR IMPLANT MESH HERNIA REPAIR/DEBRIDEMENT CLOSURE: CPT | Performed by: SURGERY

## 2022-05-12 PROCEDURE — C1781 MESH (IMPLANTABLE): HCPCS | Performed by: SURGERY

## 2022-05-12 PROCEDURE — 49560 PR REPAIR INCISIONAL HERNIA,REDUCIBLE: CPT | Performed by: SURGERY

## 2022-05-12 DEVICE — FIXATION SECURESTRAP OPEN  W/ CVD CANNULA: Type: IMPLANTABLE DEVICE | Site: ABDOMEN | Status: FUNCTIONAL

## 2022-05-12 DEVICE — VENTRIO ST HERNIA PATCH
Type: IMPLANTABLE DEVICE | Site: ABDOMEN | Status: FUNCTIONAL
Brand: VENTRIO ST HERNIA PATCH

## 2022-05-12 RX ORDER — NEOSTIGMINE METHYLSULFATE 1 MG/ML
INJECTION INTRAVENOUS AS NEEDED
Status: DISCONTINUED | OUTPATIENT
Start: 2022-05-12 | End: 2022-05-12

## 2022-05-12 RX ORDER — SODIUM CHLORIDE, SODIUM LACTATE, POTASSIUM CHLORIDE, CALCIUM CHLORIDE 600; 310; 30; 20 MG/100ML; MG/100ML; MG/100ML; MG/100ML
125 INJECTION, SOLUTION INTRAVENOUS CONTINUOUS
Status: DISCONTINUED | OUTPATIENT
Start: 2022-05-12 | End: 2022-05-12 | Stop reason: HOSPADM

## 2022-05-12 RX ORDER — OXYCODONE HYDROCHLORIDE 5 MG/1
5 TABLET ORAL EVERY 4 HOURS PRN
Qty: 10 TABLET | Refills: 0 | Status: SHIPPED | OUTPATIENT
Start: 2022-05-12 | End: 2022-05-22

## 2022-05-12 RX ORDER — MAGNESIUM HYDROXIDE 1200 MG/15ML
LIQUID ORAL AS NEEDED
Status: DISCONTINUED | OUTPATIENT
Start: 2022-05-12 | End: 2022-05-12 | Stop reason: HOSPADM

## 2022-05-12 RX ORDER — BUPIVACAINE HYDROCHLORIDE AND EPINEPHRINE 2.5; 5 MG/ML; UG/ML
INJECTION, SOLUTION EPIDURAL; INFILTRATION; INTRACAUDAL; PERINEURAL AS NEEDED
Status: DISCONTINUED | OUTPATIENT
Start: 2022-05-12 | End: 2022-05-12 | Stop reason: HOSPADM

## 2022-05-12 RX ORDER — NALOXONE HYDROCHLORIDE 0.4 MG/ML
INJECTION, SOLUTION INTRAMUSCULAR; INTRAVENOUS; SUBCUTANEOUS AS NEEDED
Status: DISCONTINUED | OUTPATIENT
Start: 2022-05-12 | End: 2022-05-12

## 2022-05-12 RX ORDER — HYDROMORPHONE HCL/PF 1 MG/ML
0.5 SYRINGE (ML) INJECTION
Status: DISCONTINUED | OUTPATIENT
Start: 2022-05-12 | End: 2022-05-12 | Stop reason: HOSPADM

## 2022-05-12 RX ORDER — ROCURONIUM BROMIDE 10 MG/ML
INJECTION, SOLUTION INTRAVENOUS AS NEEDED
Status: DISCONTINUED | OUTPATIENT
Start: 2022-05-12 | End: 2022-05-12

## 2022-05-12 RX ORDER — OXYCODONE HYDROCHLORIDE 5 MG/1
5 TABLET ORAL EVERY 4 HOURS PRN
Status: DISCONTINUED | OUTPATIENT
Start: 2022-05-12 | End: 2022-05-12 | Stop reason: HOSPADM

## 2022-05-12 RX ORDER — ONDANSETRON 2 MG/ML
4 INJECTION INTRAMUSCULAR; INTRAVENOUS ONCE AS NEEDED
Status: DISCONTINUED | OUTPATIENT
Start: 2022-05-12 | End: 2022-05-12 | Stop reason: HOSPADM

## 2022-05-12 RX ORDER — PROPOFOL 10 MG/ML
INJECTION, EMULSION INTRAVENOUS AS NEEDED
Status: DISCONTINUED | OUTPATIENT
Start: 2022-05-12 | End: 2022-05-12

## 2022-05-12 RX ORDER — FENTANYL CITRATE 50 UG/ML
INJECTION, SOLUTION INTRAMUSCULAR; INTRAVENOUS AS NEEDED
Status: DISCONTINUED | OUTPATIENT
Start: 2022-05-12 | End: 2022-05-12

## 2022-05-12 RX ORDER — ONDANSETRON 2 MG/ML
4 INJECTION INTRAMUSCULAR; INTRAVENOUS EVERY 4 HOURS PRN
Status: DISCONTINUED | OUTPATIENT
Start: 2022-05-12 | End: 2022-05-12 | Stop reason: HOSPADM

## 2022-05-12 RX ORDER — PROMETHAZINE HYDROCHLORIDE 25 MG/ML
12.5 INJECTION, SOLUTION INTRAMUSCULAR; INTRAVENOUS ONCE AS NEEDED
Status: DISCONTINUED | OUTPATIENT
Start: 2022-05-12 | End: 2022-05-12 | Stop reason: HOSPADM

## 2022-05-12 RX ORDER — SUCCINYLCHOLINE/SOD CL,ISO/PF 100 MG/5ML
SYRINGE (ML) INTRAVENOUS AS NEEDED
Status: DISCONTINUED | OUTPATIENT
Start: 2022-05-12 | End: 2022-05-12

## 2022-05-12 RX ORDER — MORPHINE SULFATE 10 MG/ML
4 INJECTION, SOLUTION INTRAMUSCULAR; INTRAVENOUS
Status: DISCONTINUED | OUTPATIENT
Start: 2022-05-12 | End: 2022-05-12 | Stop reason: HOSPADM

## 2022-05-12 RX ORDER — ALBUTEROL SULFATE 2.5 MG/3ML
2.5 SOLUTION RESPIRATORY (INHALATION) ONCE AS NEEDED
Status: DISCONTINUED | OUTPATIENT
Start: 2022-05-12 | End: 2022-05-12 | Stop reason: HOSPADM

## 2022-05-12 RX ORDER — LIDOCAINE HYDROCHLORIDE 10 MG/ML
INJECTION, SOLUTION EPIDURAL; INFILTRATION; INTRACAUDAL; PERINEURAL AS NEEDED
Status: DISCONTINUED | OUTPATIENT
Start: 2022-05-12 | End: 2022-05-12

## 2022-05-12 RX ORDER — GLYCOPYRROLATE 0.2 MG/ML
INJECTION INTRAMUSCULAR; INTRAVENOUS AS NEEDED
Status: DISCONTINUED | OUTPATIENT
Start: 2022-05-12 | End: 2022-05-12

## 2022-05-12 RX ORDER — ONDANSETRON 2 MG/ML
INJECTION INTRAMUSCULAR; INTRAVENOUS AS NEEDED
Status: DISCONTINUED | OUTPATIENT
Start: 2022-05-12 | End: 2022-05-12

## 2022-05-12 RX ORDER — SODIUM CHLORIDE, SODIUM LACTATE, POTASSIUM CHLORIDE, CALCIUM CHLORIDE 600; 310; 30; 20 MG/100ML; MG/100ML; MG/100ML; MG/100ML
INJECTION, SOLUTION INTRAVENOUS CONTINUOUS PRN
Status: DISCONTINUED | OUTPATIENT
Start: 2022-05-12 | End: 2022-05-12

## 2022-05-12 RX ORDER — FENTANYL CITRATE/PF 50 MCG/ML
25 SYRINGE (ML) INJECTION
Status: DISCONTINUED | OUTPATIENT
Start: 2022-05-12 | End: 2022-05-12 | Stop reason: HOSPADM

## 2022-05-12 RX ADMIN — ONDANSETRON 4 MG: 2 INJECTION INTRAMUSCULAR; INTRAVENOUS at 15:22

## 2022-05-12 RX ADMIN — PROPOFOL 30 MG: 10 INJECTION, EMULSION INTRAVENOUS at 15:17

## 2022-05-12 RX ADMIN — NEOSTIGMINE METHYLSULFATE 4 MG: 1 INJECTION INTRAVENOUS at 15:43

## 2022-05-12 RX ADMIN — NALXONE HYDROCHLORIDE 0.04 MG: 0.4 INJECTION INTRAMUSCULAR; INTRAVENOUS; SUBCUTANEOUS at 15:58

## 2022-05-12 RX ADMIN — NALXONE HYDROCHLORIDE 0.04 MG: 0.4 INJECTION INTRAMUSCULAR; INTRAVENOUS; SUBCUTANEOUS at 15:54

## 2022-05-12 RX ADMIN — ROCURONIUM BROMIDE 15 MG: 50 INJECTION, SOLUTION INTRAVENOUS at 15:17

## 2022-05-12 RX ADMIN — PROPOFOL 150 MG: 10 INJECTION, EMULSION INTRAVENOUS at 14:50

## 2022-05-12 RX ADMIN — GLYCOPYRROLATE 0.8 MG: 0.2 INJECTION, SOLUTION INTRAMUSCULAR; INTRAVENOUS at 15:43

## 2022-05-12 RX ADMIN — SODIUM CHLORIDE, SODIUM LACTATE, POTASSIUM CHLORIDE, AND CALCIUM CHLORIDE: .6; .31; .03; .02 INJECTION, SOLUTION INTRAVENOUS at 13:54

## 2022-05-12 RX ADMIN — ONDANSETRON 4 MG: 2 INJECTION INTRAMUSCULAR; INTRAVENOUS at 17:11

## 2022-05-12 RX ADMIN — CEFAZOLIN SODIUM 2000 MG: 2 SOLUTION INTRAVENOUS at 14:45

## 2022-05-12 RX ADMIN — FENTANYL CITRATE 100 MCG: 50 INJECTION INTRAMUSCULAR; INTRAVENOUS at 14:50

## 2022-05-12 RX ADMIN — LIDOCAINE HYDROCHLORIDE 50 MG: 10 INJECTION, SOLUTION EPIDURAL; INFILTRATION; INTRACAUDAL; PERINEURAL at 14:50

## 2022-05-12 RX ADMIN — Medication 100 MG: at 14:50

## 2022-05-12 RX ADMIN — OXYCODONE HYDROCHLORIDE 5 MG: 5 TABLET ORAL at 18:23

## 2022-05-12 NOTE — ANESTHESIA PREPROCEDURE EVALUATION
Procedure:  REPAIR HERNIA INCISIONAL (N/A Abdomen)    Relevant Problems   CARDIO   (+) CAD (coronary artery disease)   (+) Chest pain on breathing   (+) DVT (deep venous thrombosis) (HCC)   (+) Essential hypertension   (+) Hyperlipidemia   (+) Mixed hyperlipidemia   (+) PVC's (premature ventricular contractions)   (+) S/P CABG (coronary artery bypass graft)      ENDO   (+) Hypothyroidism   (+) Type 2 diabetes mellitus with complication, with long-term current use of insulin ([facility])      /RENAL   (+) CKD (chronic kidney disease)   (+) Stage 3b chronic kidney disease (HCC)      MUSCULOSKELETAL   (+) Fibromyalgia   (+) Lumbar spondylosis   (+) Myofascial pain syndrome   (+) Osteoarthritis of spine with radiculopathy, lumbar region   (+) Psoriasis with arthropathy ([facility])   (+) Spondylosis of cervical region without myelopathy or radiculopathy   (+) Spondylosis of lumbar region without myelopathy or radiculopathy      NEURO/PSYCH   (+) Chronic pain syndrome   (+) Diabetic polyneuropathy associated with type 2 diabetes mellitus ([facility])   (+) Diplopia   (+) Fibromyalgia   (+) MADAN (generalized anxiety disorder)   (+) History of DVT (deep vein thrombosis)   (+) MDD (major depressive disorder), recurrent severe, without psychosis ([facility])   (+) Myofascial pain syndrome   (+) Panic attacks   (+) Vascular claudication ([facility])      PULMONARY   (+) SARAY (obstructive sleep apnea)   (+) SOB (shortness of breath)        Physical Exam    Airway    Mallampati score: II  TM Distance: >3 FB  Neck ROM: full     Dental       Cardiovascular  Cardiovascular exam normal    Pulmonary  Pulmonary exam normal     Other Findings        Anesthesia Plan  ASA Score- 4     Anesthesia Type- general with ASA Monitors  Additional Monitors:   Airway Plan: LMA  Plan Factors-Exercise tolerance (METS): >4 METS  Chart reviewed  EKG reviewed  Imaging results reviewed  Existing labs reviewed  Patient summary reviewed      Patient is not a current smoker  Patient did not smoke on day of surgery  Obstructive sleep apnea risk education given perioperatively  Induction- intravenous  Postoperative Plan- Plan for postoperative opioid use  Planned trial extubation    Informed Consent- Anesthetic plan and risks discussed with patient  I personally reviewed this patient with the CRNA  Discussed and agreed on the Anesthesia Plan with the CRNA  Loi Carpio

## 2022-05-12 NOTE — ANESTHESIA POSTPROCEDURE EVALUATION
Post-Op Assessment Note    CV Status:  Stable  Pain Score: 0    Pain management: adequate     Mental Status:  Alert and awake   Hydration Status:  Euvolemic   PONV Controlled:  Controlled   Airway Patency:  Patent      Post Op Vitals Reviewed: Yes      Staff: CRNA         No complications documented      BP  161/69   Temp   97 4   Pulse  85   Resp   15   SpO2   100

## 2022-05-12 NOTE — H&P
History and Physical -[facility] 77 y o  female MRN: [id number]  Unit/Bed#: OR [place] Encounter: [id number]       Principal Problem:  Incisional hernia    HPI: Rossy Fields is a 77y o  year old female who presents with incisional hernia    Please see office note from 04/06/2022      Review of Systems    Historical Information   Past Medical History:   Diagnosis Date    Acute embolism and thrombosis of unspecified deep veins of unspecified lower extremity (HCC)     Last Assessed:  5/18/17    Anemia     Anosmia     Anxiety     Arthritis     Asthma     Back pain     Bilateral macular retinal edema     CAD (coronary artery disease)     Cataract     Cervical disc herniation     Cervical radiculopathy     Cervical spinal stenosis     Cervical spondylolysis     Chronic kidney disease     Chronic mastoiditis     Colon polyp     Complex endometrial hyperplasia     Depression     Diabetes mellitus (HCC)     Disease of thyroid gland     DVT (deep venous thrombosis) (HCC)     Fibromyalgia     Fibromyalgia, primary     Hyperlipidemia     Hypertension     Hypothyroid     Kidney stone     Lumbar radiculopathy     Neck pain     Obese     PONV (postoperative nausea and vomiting)     Shingles 07/01/2021    Spinal stenosis     Stomach ulcer     Thyroid disease      Past Surgical History:   Procedure Laterality Date    BACK SURGERY      CARPAL TUNNEL RELEASE      CATARACT EXTRACTION      CHOLECYSTECTOMY      COLONOSCOPY      CORONARY ANGIOPLASTY      CORONARY ARTERY BYPASS GRAFT      CYSTOSCOPY N/A 6/20/2017    Procedure: CYSTOSCOPY;  Surgeon: Marie Valle MD;  [facility]: [facility];  Service: Gynecology Oncology    CYSTOSCOPY      MO LAPAROSCOPY W TOT HYSTERECTUTERUS <=250 GRAM  W TUBE/OVARY N/A 6/20/2017    Procedure: ROBOTIC HYSTERECTOMY; BILATERAL SALPINO-OOPHERECTOMY; umbilical hernia repair ;  Surgeon: Marie Valle MD;  Location: [facility];  Service: Gynecology Oncology    TONSILECTOMY AND ADNOIDECTOMY      TONSILLECTOMY      UMBILICAL HERNIA REPAIR       Social History   Social History     Substance and Sexual Activity   Alcohol Use No     Social History     Substance and Sexual Activity   Drug Use No     Social History     Tobacco Use   Smoking Status Former Smoker    Packs/day: 1 00    Years: 6 00    Pack years: 6 00    Types: Cigarettes    Quit date: 47    Years since quittin 3   Smokeless Tobacco Never Used   Tobacco Comment    Smoked about a half a pack for about 4 yrs  Quite about 50 yrs ago       Family History   Problem Relation Age of Onset    Arthritis Mother     Leukemia Mother     Other Mother         Anxiety, major depressive disorder, recurrent episode with atypical features    Coronary artery disease Father         Heart problem    Diabetes Father     Other Father         Infectious disease    Alzheimer's disease Maternal Grandmother     Other Maternal Grandfather         Heart problem    Other Daughter         Anxiety, major depressive disorder, recurrent episode with atypical features    Alcohol abuse Other         Grandparent    Cancer Family     Diabetes Family     Hypertension Family     Other Family         Reported prior back trouble, thyroid disorder       Meds/Allergies     Medications Prior to Admission   Medication    ALPRAZolam (XANAX) 0 5 mg tablet    aspirin 81 MG tablet    cholecalciferol (VITAMIN D3) 1,000 units tablet    Coenzyme Q10 (Co Q-10) 200 MG CAPS    diclofenac sodium (VOLTAREN) 1 %    diltiazem (TIAZAC) 300 MG 24 hr capsule    docusate sodium (COLACE) 100 mg capsule    DULoxetine (CYMBALTA) 60 mg delayed release capsule    enalapril (VASOTEC) 20 mg tablet    insulin aspart (NovoLOG) 100 units/mL injection    isosorbide mononitrate (IMDUR) 60 mg 24 hr tablet    levothyroxine 50 mcg tablet    nitroglycerin (NITROLINGUAL) 0 4 mg/spray spray    rosuvastatin (CRESTOR) 20 MG tablet    traZODone (DESYREL) 50 mg tablet    B-D UF III MINI PEN NEEDLES 31G X 5 MM MISC    BAQSIMI TWO PACK 3 MG/DOSE POWD    Insulin Disposable Pump (OmniPod Dash 5 Pack Pods) MISC    Insulin Disposable Pump (OmniPod Dash 5 Pack Pods) MISC    naloxone (NARCAN) 4 mg/0 1 mL nasal spray     Current Facility-Administered Medications   Medication Dose Route Frequency    ceFAZolin (ANCEF) IVPB (premix in dextrose) 2,000 mg 50 mL  2,000 mg Intravenous Once       Allergies   Allergen Reactions    Molds & Smuts Allergic Rhinitis    Other Allergic Rhinitis     RAGWEED, CAT DANDER, DOG DANDER     Pollen Extract Other (See Comments)     Cold symptoms             There were no vitals taken for this visit  No intake or output data in the 24 hours ending 05/12/22 1317    PHYSICAL EXAM  General appearance: alert and oriented, in no acute distress  Lungs: clear to auscultation bilaterally  Heart: regular rate and rhythm, S1, S2 normal, no murmur, click, rub or gallop  Abdomen: soft, non-tender; bowel sounds normal; no masses,  no organomegaly  Reducible incisional hernia just above the umbilicus  Rectal: deferred  Skin: Skin color, texture, turgor normal  No rashes or lesions    Lab Results:   No visits with results within 1 Day(s) from this visit     Latest known visit with results is:   Appointment on 05/05/2022   Component Date Value    WBC 05/05/2022 9 12     RBC 05/05/2022 4 10     Hemoglobin 05/05/2022 10 8 (A)    Hematocrit 05/05/2022 35 3     MCV 05/05/2022 86     MCH 05/05/2022 26 3 (A)    MCHC 05/05/2022 30 6 (A)    RDW 05/05/2022 15 5 (A)    Platelets 56/41/7328 264     MPV 05/05/2022 11 0     Sodium 05/05/2022 138     Potassium 05/05/2022 4 0     Chloride 05/05/2022 104     CO2 05/05/2022 27     ANION GAP 05/05/2022 7     BUN 05/05/2022 18     Creatinine 05/05/2022 1 35 (A)    Glucose, Fasting 05/05/2022 140 (A)    Calcium 05/05/2022 9 2     eGFR 05/05/2022 38      Imaging Studies:     ASSESSMENT:  Incisional hernia    PLAN:  Risks and benefits of an incisional hernia repair discussed with her including potential for recurrence or bowel injury and she agrees to proceed    Counseling / Coordination of Care  Total time spent today  20 minutes  Greater than 50% of total time was spent with the patient and / or family counseling and / or coordination of care

## 2022-05-12 NOTE — OP NOTE
OPERATIVE REPORT  PATIENT NAME: Fayne Curling    :  1945  MRN: [id number]  Pt Location: AN OR ROOM 03    SURGERY DATE: 2022    Surgeon(s) and Role:     * Carl Dance, DO - Primary     * Kia Youngblood MD - Assisting    Preop Diagnosis:  Incisional hernia [K43 2]    Post-Op Diagnosis Codes:     * Incisional hernia [K43 2] x2    Procedure(s) (LRB):  REPAIR HERNIA INCISIONAL (N/A) in 8 x 12 cm Ventrio ST mesh    Specimen(s):  * No specimens in log *    Estimated Blood Loss:   5 mL    Drains:  * No LDAs found *    Anesthesia Type:   General/local    Operative Indications:  Incisional hernia [K43 2]      Operative Findings:  Incisional hernia x2  ASA 4  Wound class 2  Height 59 in weight 89 kg/197 lb  BMI 40    Complications:   None    Procedure and Technique:  Patient was brought the operative suite and identified ablation, conversation, by armband  Compression pumps were placed about the umbilicus  Periumbilical midline incision was made  Underlying subcutaneous tissue was divided hot cautery  We did immediately encounter the hernia sac  This was carefully opened up with Metzenbaum scissors revealing some bowel inside  Careful dissection was carried out to trim away some bowel was adherent to the undersurface of the hernia sac  Once free this was placed back into the abdominal cavity  Some omentum was also freed out  Hernia sac was trimmed away  Secondary hernia was noted just to the left side of the initial hernia  We had an around the entire defect  An 8 x 12 cm Ventrio ST mesh was chosen  This was placed in a horizontal fashion  This was anchored at 3 and 9:00 a m  Positions with 1  PDS  Omentum had been placed bowel did from the mesh  This was then used to anchor the circumference of the mesh to the undersurface of the fascia  Copious irrigation was carried out  1  PDS was used in a serial figure-of-eight fashion to close the fascia on top of the mesh    Dural skin glue was applied  She was awakened in the operating returned to the recovery area in stable condition having tolerated the procedure well  Abdominal binder was applied     I was present for the entire procedure    Patient Disposition:  PACU       SIGNATURE: Juvencio Quijano, DO  DATE: May 12, 2022  TIME: 3:34 PM

## 2022-05-24 ENCOUNTER — TELEPHONE (OUTPATIENT)
Dept: OTHER | Facility: OTHER | Age: 77
End: 2022-05-24

## 2022-05-24 NOTE — TELEPHONE ENCOUNTER
Patient was returning your call  She got a call today from your office but she doesn't know what it was about  Patient is requesting a call back

## 2022-05-26 ENCOUNTER — OFFICE VISIT (OUTPATIENT)
Dept: SURGERY | Facility: CLINIC | Age: 77
End: 2022-05-26

## 2022-05-26 DIAGNOSIS — F41.0 PANIC ATTACKS: ICD-10-CM

## 2022-05-26 DIAGNOSIS — F41.9 ANXIETY: ICD-10-CM

## 2022-05-26 DIAGNOSIS — K43.2 INCISIONAL HERNIA: Primary | ICD-10-CM

## 2022-05-26 DIAGNOSIS — F41.1 GAD (GENERALIZED ANXIETY DISORDER): ICD-10-CM

## 2022-05-26 PROCEDURE — 99024 POSTOP FOLLOW-UP VISIT: CPT | Performed by: SURGERY

## 2022-05-26 NOTE — PROGRESS NOTES
Assessment/Plan:    Diagnoses and all orders for this visit:    Incisional hernia    Status post incisional hernia repair  Wound healing quite well  Asked to refrain from heavy lifting for another 2 weeks  At that point she may resume unrestricted activity      Postoperative restrictions reviewed  All questions answered  ______________________________________________________  HPI: Patient presents post operatively  Incisional hernia repair 5/12/2022  ROS:  General ROS: negative for - chills, fatigue, fever or night sweats, weight loss  Respiratory ROS: no cough, shortness of breath, or wheezing  Cardiovascular ROS: no chest pain or dyspnea on exertion  Genito-Urinary ROS: no dysuria, trouble voiding, or hematuria  Musculoskeletal ROS: negative for - gait disturbance, joint pain or muscle pain  Neurological ROS: no TIA or stroke symptoms  GI ROS: see HPI  Skin ROS: no new rashes or lesions   Lymphatic ROS: no new adenopathy noted by pt     GYN ROS: see HPI, no new GYN history or bleeding noted  Psy ROS: no new mental or behavioral disturbances         Patient Active Problem List   Diagnosis    Panic attacks    Type 2 diabetes mellitus with complication, with long-term current use of insulin ([address] )    MDD (major depressive disorder), recurrent severe, without psychosis ([address] )    Chest pain on breathing    History of DVT (deep vein thrombosis)    PVC's (premature ventricular contractions)    CAD (coronary artery disease)    Essential hypertension    Hyperlipidemia    Fibromyalgia    Spinal stenosis of lumbar region    Adenomatous polyp of colon    Vitreo-retinal adhesions    Vitamin D deficiency    Vitamin B12 deficiency    Ventral hernia    Vascular claudication (HCC)    Thickened endometrium    Spondylosis of lumbar region without myelopathy or radiculopathy    Spondylosis of cervical region without myelopathy or radiculopathy    Palpitations    Onychomycosis    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    Obesity    Myofascial pain syndrome    Retinal edema    Diabetic polyneuropathy associated with type 2 diabetes mellitus (HCC)    SARAY (obstructive sleep apnea)    Allergic rhinitis    Pulmonary nodules    Lumbar spondylosis    Chronic pain syndrome    Lumbar facet arthropathy    Post-nasal drip    Vision changes    Closed fracture of nasal bones    Restrictive lung disease secondary to obesity    MADAN (generalized anxiety disorder)    Insomnia    CKD (chronic kidney disease)    Other proteinuria    Lung nodule    Abdominal bloating    Neck pain    Herpes zoster without complication    Post herpetic neuralgia    SOB (shortness of breath)    Apraxia    Skin lesion    Microalbuminuria    Stage 3b chronic kidney disease (HCC)    Hypothyroidism    Diplopia    Lumbar radiculopathy    Contracture of hand    Colon polyps    [facility] thrombosis) [facility])    Presence of stent in coronary artery    Psoriasis with arthropathy ([facility])    S/P CABG (coronary artery bypass graft)    Complex endometrial hyperplasia    Mixed hyperlipidemia    Lung nodule    Other constipation    Osteoarthritis of spine with radiculopathy, lumbar region    Protrusion of intervertebral disc of thoracic region       Allergies:  Molds & smuts, Other, and Pollen extract      Current Outpatient Medications:     ALPRAZolam (XANAX) 0 5 mg tablet, Take 1 tablet (0 5 mg total) by mouth 2 (two) times a day as needed for anxiety, Disp: 60 tablet, Rfl: 1    aspirin 81 MG tablet, Take 81 mg by mouth daily, Disp: , Rfl:     B-D UF III MINI PEN NEEDLES 31G X 5 MM MISC, , Disp: , Rfl:     BAQSIMI TWO PACK 3 MG/DOSE POWD, Use as directed , Disp: , Rfl: 0    cholecalciferol (VITAMIN D3) 1,000 units tablet, Take 2,000 Units by mouth daily, Disp: , Rfl:     Coenzyme Q10 (Co Q-10) 200 MG CAPS, Take by mouth in the morning, Disp: , Rfl:    diclofenac sodium (VOLTAREN) 1 %, Apply 2 g topically 4 (four) times a day (Patient taking differently: Apply 2 g topically as needed in the morning ), Disp: 1 Tube, Rfl: 0    diltiazem (TIAZAC) 300 MG 24 hr capsule, Take 300 mg by mouth daily, Disp: , Rfl:     docusate sodium (COLACE) 100 mg capsule, Take 1 capsule (100 mg total) by mouth 2 (two) times a day (Patient taking differently: Take 100 mg by mouth as needed in the morning and 100 mg as needed in the evening ), Disp: 20 capsule, Rfl: 0    DULoxetine (CYMBALTA) 60 mg delayed release capsule, TAKE 1 CAPSULE BY MOUTH EVERY DAY, Disp: 90 capsule, Rfl: 1    enalapril (VASOTEC) 20 mg tablet, Take 20 mg by mouth daily at bedtime  , Disp: , Rfl:     insulin aspart (NovoLOG) 100 units/mL injection, Inject 12 Units under the skin 3 (three) times a day before meals (Patient taking differently: Inject under the skin INSULIN PUMP), Disp: , Rfl: 0    Insulin Disposable Pump (OmniPod Dash 5 Pack Pods) MISC, USE 1 POD EVERY 1 5 DAYS, Disp: , Rfl:     Insulin Disposable Pump (OmniPod Dash 5 Pack Pods) MISC, Omnipod Dash Insulin Pod subcutaneous cartridge  USE 1 POD EVERY 1 5 DAYS, Disp: , Rfl:     isosorbide mononitrate (IMDUR) 60 mg 24 hr tablet, TAKE 1 5 TABs AT NIGHT, Disp: , Rfl:     levothyroxine 50 mcg tablet, Take 50 mcg by mouth daily, Disp: , Rfl:     naloxone (NARCAN) 4 mg/0 1 mL nasal spray, Administer 1 spray into a nostril  If no response after 2-3 minutes, give another dose in the other nostril using a new spray  (Patient not taking: No sig reported), Disp: 1 each, Rfl: 1    nitroglycerin (NITROLINGUAL) 0 4 mg/spray spray, USE ONE SPRAY Q 5 MINUTES AS NEEDED FOR CHEST PAIN   IF NO RELIEF, CALL 911 , Disp: , Rfl: 1    rosuvastatin (CRESTOR) 20 MG tablet, Take 20 mg by mouth every evening  , Disp: , Rfl: 2    traZODone (DESYREL) 50 mg tablet, TAKE 0 5-1 TABLETS (25-50 MG TOTAL) BY MOUTH DAILY AT BEDTIME AS NEEDED FOR SLEEP, Disp: 90 tablet, Rfl: 1    Past Medical History:   Diagnosis Date    Acute embolism and thrombosis of unspecified deep veins of unspecified lower extremity (HCC)     Last Assessed:  5/18/17    Anemia     Anosmia     Anxiety     Arthritis     Asthma     Back pain     Bilateral macular retinal edema     CAD (coronary artery disease)     Cataract     Cervical disc herniation     Cervical radiculopathy     Cervical spinal stenosis     Cervical spondylolysis     Chronic kidney disease     Chronic mastoiditis     Colon polyp     Complex endometrial hyperplasia     Depression     Diabetes mellitus ([address] )     Disease of thyroid gland     DVT (deep venous thrombosis) (HCC)     Fibromyalgia     Fibromyalgia, primary     Hyperlipidemia     Hypertension     Hypothyroid     Kidney stone     Lumbar radiculopathy     Neck pain     Obese     PONV (postoperative nausea and vomiting)     Shingles 07/01/2021    Spinal stenosis     Stomach ulcer     Thyroid disease        Past Surgical History:   Procedure Laterality Date    BACK SURGERY      CARPAL TUNNEL RELEASE      CATARACT EXTRACTION      CHOLECYSTECTOMY      COLONOSCOPY      CORONARY ANGIOPLASTY      CORONARY ARTERY BYPASS GRAFT      CYSTOSCOPY N/A 6/20/2017    Procedure: Sagar Mistry;  Surgeon: Jennifer Alexander MD;  [facility];  Service: Gynecology Oncology    CYSTOSCOPY      [place] LAPAROSCOPY W TOT HYSTERECTUTERUS <=250 GRAM  W TUBE/OVARY N/A 6/20/2017    Procedure: ROBOTIC HYSTERECTOMY; BILATERAL SALPINO-OOPHERECTOMY; umbilical hernia repair ;  Surgeon: Jennifer Alexander MD;  [facility];  Service: Gynecology Oncology    [place] REPAIR INCISIONAL HERNIA,REDUCIBLE N/A 5/12/2022    Procedure: REPAIR HERNIA INCISIONAL;  Surgeon: Viral Win DO;  [facility];  Service: General    TONSILECTOMY AND ADNOIDECTOMY      TONSILLECTOMY      UMBILICAL HERNIA REPAIR         Family History   Problem Relation Age of Onset    Arthritis Mother     Leukemia Mother     Other Mother         Anxiety, major depressive disorder, recurrent episode with atypical features    Coronary artery disease Father         Heart problem    Diabetes Father     Other Father         Infectious disease    Alzheimer's disease Maternal Grandmother     Other Maternal Grandfather         Heart problem    Other Daughter         Anxiety, major depressive disorder, recurrent episode with atypical features    Alcohol abuse Other         Grandparent    Cancer Family     Diabetes Family     Hypertension Family     Other Family         Reported prior back trouble, thyroid disorder        reports that she quit smoking about 38 years ago  Her smoking use included cigarettes  She has a 6 00 pack-year smoking history  She has never used smokeless tobacco  She reports that she does not drink alcohol and does not use drugs  PHYSICAL EXAM    There were no vitals taken for this visit      General: normal, cooperative, no distress  Abdominal: soft, nondistended or nontender  Incision: clean, dry, and intact and healing well      Juvencio Quijano, DO    Date: 5/26/2022 Time: 2:29 PM

## 2022-05-31 RX ORDER — ALPRAZOLAM 0.5 MG/1
0.5 TABLET ORAL 2 TIMES DAILY PRN
Qty: 60 TABLET | Refills: 0 | Status: SHIPPED | OUTPATIENT
Start: 2022-05-31 | End: 2022-06-27 | Stop reason: SDUPTHER

## 2022-05-31 NOTE — TELEPHONE ENCOUNTER
Reyes-covering for Dr Rios Comes  Placed call to patient who states that she does not see Dr Dion Ahn anymore  Patient states that she spoke with Dr Rios Comes about this at last office visit, and he said he would take back over prescribing the xanax  Patient states she is out since Friday and needs this asap

## 2022-06-15 ENCOUNTER — TELEPHONE (OUTPATIENT)
Dept: NEPHROLOGY | Facility: CLINIC | Age: 77
End: 2022-06-15

## 2022-06-15 NOTE — TELEPHONE ENCOUNTER
Multiple attempts have been documented to schedule/reschedule patient for a follow up  Patient cancelled her last appt stating she was going to call us to r/s  Sent msg via [employer] for patient to call and schedule

## 2022-06-27 DIAGNOSIS — F41.0 PANIC ATTACKS: ICD-10-CM

## 2022-06-27 DIAGNOSIS — F41.9 ANXIETY: ICD-10-CM

## 2022-06-27 DIAGNOSIS — F41.1 GAD (GENERALIZED ANXIETY DISORDER): ICD-10-CM

## 2022-06-27 NOTE — TELEPHONE ENCOUNTER
Pt is no longer seeing Dr Gurvinder Porter and would like Dr Charla Machuca to continue filling her Rx  Please contact pt with any questions

## 2022-06-28 ENCOUNTER — SOCIAL WORK (OUTPATIENT)
Dept: BEHAVIORAL/MENTAL HEALTH CLINIC | Facility: CLINIC | Age: 77
End: 2022-06-28
Payer: COMMERCIAL

## 2022-06-28 ENCOUNTER — OFFICE VISIT (OUTPATIENT)
Dept: OBGYN CLINIC | Facility: HOSPITAL | Age: 77
End: 2022-06-28
Payer: COMMERCIAL

## 2022-06-28 VITALS
BODY MASS INDEX: 40.4 KG/M2 | HEART RATE: 76 BPM | DIASTOLIC BLOOD PRESSURE: 75 MMHG | WEIGHT: 200.4 LBS | HEIGHT: 59 IN | SYSTOLIC BLOOD PRESSURE: 151 MMHG

## 2022-06-28 DIAGNOSIS — M25.562 CHRONIC PAIN OF BOTH KNEES: ICD-10-CM

## 2022-06-28 DIAGNOSIS — M17.0 BILATERAL PRIMARY OSTEOARTHRITIS OF KNEE: Primary | ICD-10-CM

## 2022-06-28 DIAGNOSIS — R73.9 HYPERGLYCEMIA: ICD-10-CM

## 2022-06-28 DIAGNOSIS — M25.561 CHRONIC PAIN OF BOTH KNEES: ICD-10-CM

## 2022-06-28 DIAGNOSIS — M79.7 FIBROMYALGIA: Chronic | ICD-10-CM

## 2022-06-28 DIAGNOSIS — F41.9 ANXIETY: Primary | ICD-10-CM

## 2022-06-28 DIAGNOSIS — G89.29 CHRONIC PAIN OF BOTH KNEES: ICD-10-CM

## 2022-06-28 DIAGNOSIS — M48.062 SPINAL STENOSIS OF LUMBAR REGION WITH NEUROGENIC CLAUDICATION: Chronic | ICD-10-CM

## 2022-06-28 PROCEDURE — 1036F TOBACCO NON-USER: CPT | Performed by: ORTHOPAEDIC SURGERY

## 2022-06-28 PROCEDURE — 1160F RVW MEDS BY RX/DR IN RCRD: CPT | Performed by: ORTHOPAEDIC SURGERY

## 2022-06-28 PROCEDURE — 3077F SYST BP >= 140 MM HG: CPT | Performed by: ORTHOPAEDIC SURGERY

## 2022-06-28 PROCEDURE — 99213 OFFICE O/P EST LOW 20 MIN: CPT | Performed by: ORTHOPAEDIC SURGERY

## 2022-06-28 PROCEDURE — 90837 PSYTX W PT 60 MINUTES: CPT | Performed by: SOCIAL WORKER

## 2022-06-28 PROCEDURE — 3078F DIAST BP <80 MM HG: CPT | Performed by: ORTHOPAEDIC SURGERY

## 2022-06-28 RX ORDER — INSULIN ASPART 100 [IU]/ML
INJECTION, SOLUTION INTRAVENOUS; SUBCUTANEOUS
COMMUNITY
Start: 2022-04-21

## 2022-06-28 NOTE — PROGRESS NOTES
Subjective;    78-year-old adult female who is 3 months removed from Visco supplement injections to both knees  Additionally, she has a history that is positive for lumbar spinal stenosis, for neurogenic claudication, and fibromyalgia  Additionally, she has triggering of fingers of her right hand,   She canceled the surgical procedure secondary to selling her home and moving  She believes that most of the symptoms in her lower extremities are from her lumbar spinal stenosis  Comes the office today in follow-up to provide clarity      Past Medical History:   Diagnosis Date    Acute embolism and thrombosis of unspecified deep veins of unspecified lower extremity (HCC)     Last Assessed:  5/18/17    Anemia     Anosmia     Anxiety     Arthritis     Asthma     Back pain     Bilateral macular retinal edema     CAD (coronary artery disease)     Cataract     Cervical disc herniation     Cervical radiculopathy     Cervical spinal stenosis     Cervical spondylolysis     Chronic kidney disease     Chronic mastoiditis     Colon polyp     Complex endometrial hyperplasia     Depression     Diabetes mellitus (HCC)     Disease of thyroid gland     DVT (deep venous thrombosis) (HCC)     Fibromyalgia     Fibromyalgia, primary     Hyperlipidemia     Hypertension     Hypothyroid     Kidney stone     Lumbar radiculopathy     Neck pain     Obese     PONV (postoperative nausea and vomiting)     Shingles 07/01/2021    Spinal stenosis     Stomach ulcer     Thyroid disease        Past Surgical History:   Procedure Laterality Date    BACK SURGERY      CARPAL TUNNEL RELEASE      CATARACT EXTRACTION      CHOLECYSTECTOMY      COLONOSCOPY      CORONARY ANGIOPLASTY      CORONARY ARTERY BYPASS GRAFT      CYSTOSCOPY N/A 6/20/2017    Procedure: Jean Carlos Bhatia;  Surgeon: Roberto Zuniga MD;  [facility];  Service: Gynecology Oncology    CYSTOSCOPY      [place] LAPAROSCOPY Sanya Cerrato <=250 GRAM  W TUBE/OVARY N/A 2017    Procedure: ROBOTIC HYSTERECTOMY; BILATERAL SALPINO-OOPHERECTOMY; umbilical hernia repair ;  Surgeon: Brittney Mckeon MD;  [facility];  Service: Gynecology Oncology    [place] REPAIR INCISIONAL HERNIA,REDUCIBLE N/A 2022    Procedure: REPAIR HERNIA INCISIONAL;  Surgeon: Darrel Koyanagi, DO;  [facility];  Service: General    TONSILECTOMY AND ADNOIDECTOMY      TONSILLECTOMY      UMBILICAL HERNIA REPAIR         Family History   Problem Relation Age of Onset    Arthritis Mother     Leukemia Mother     Other Mother         Anxiety, major depressive disorder, recurrent episode with atypical features    Coronary artery disease Father         Heart problem    Diabetes Father     Other Father         Infectious disease    Alzheimer's disease Maternal Grandmother     Other Maternal Grandfather         Heart problem    Other Daughter         Anxiety, major depressive disorder, recurrent episode with atypical features    Alcohol abuse Other         Grandparent    Cancer Family     Diabetes Family     Hypertension Family     Other Family         Reported prior back trouble, thyroid disorder       Social History     Tobacco Use    Smoking status: Former Smoker     Packs/day: 1 00     Years: 6 00     Pack years: 6 00     Types: Cigarettes     Quit date:      Years since quittin 5    Smokeless tobacco: Never Used    Tobacco comment: Smoked about a half a pack for about 4 yrs  Quite about 50 yrs ago  Vaping Use    Vaping Use: Never used   Substance Use Topics    Alcohol use: No    Drug use: No     Exam;    Patient has the ability to extend her knee as well as flexion without significant discomfort  Patient does have hyperesthesia of her calves anteromedial aspect of her calves bilaterally  She has very little discomfort to palpation of either the medial or lateral joint space of her knees at rest     Impression;     History of arthritic knees with successful Visco supplementation injections 3 months ago  Lower extremity tiredness, hyperesthesia, secondary to lumbar spinal stenosis and fibromyalgia  Plan;    She may have Visco supplement injections Q 6 months  She openly wishes to avoid steroid injections which cause a transient change in her blood sugar, which is not under control  She is pending a visit with her spine professional     We will see her on a p r n  basis,    Her experience was supervised by and plan formulated by the attending surgeon it was my privilege to assist him in the delivery of her care

## 2022-06-29 RX ORDER — ALPRAZOLAM 0.5 MG/1
0.5 TABLET ORAL 2 TIMES DAILY PRN
Qty: 60 TABLET | Refills: 0 | Status: SHIPPED | OUTPATIENT
Start: 2022-06-29 | End: 2022-08-01 | Stop reason: SDUPTHER

## 2022-06-29 NOTE — PSYCH
Psychotherapy Provided: Individual Psychotherapy 60 minutes from 2:00-3:00    Length of time in session: 60 minutes, follow up in 6 week    Encounter Diagnosis     ICD-10-CM    1  Anxiety  F41 9        Goals addressed in session: Goal 1     Pain:      none    0    Current suicide risk : Dieter Claros has been feeling more anxious and overwhelmed at times as she hand her  have decided to sell home and move into smaller residence  Move should be completed by end of next month  Son has been very helpful but she has been largely responsible to get things started on her own  Daughter not supportive of move but this is related to her own agenda and not what is in best interest of her parents  Managing stress and anxiety best she can  Denies any acute depressive symptoms  Move more challenging due to her physical health issues  Zhanna Aiken presents as mildly anxious but is verbal, cooperative, well oriented and engaged during session  Thoughts logical and goal directed  Able to make light of stress related to move  P- processed stress related to move- validation and supportive therapy provided  Reviewed stress mgmt strategies and productive outlets to utilize to manage  Reviewed communication strategies and boundaries to use with family to illicit more assistance and limit exposure to additional stress  Behavioral Health Treatment Plan [facility]: Diagnosis and Treatment Plan explained to Citlaly Noguera relates understanding diagnosis and is agreeable to Treatment Plan   Yes

## 2022-06-29 NOTE — TELEPHONE ENCOUNTER
Patient advised and will sign contract this week  You already have the request, it just needs to be approved  Please advise

## 2022-07-07 ENCOUNTER — RA CDI HCC (OUTPATIENT)
Dept: OTHER | Facility: HOSPITAL | Age: 77
End: 2022-07-07

## 2022-07-07 NOTE — PROGRESS NOTES
[facility]  coding opportunities       Chart reviewed, no opportunity found:   [address]        Patients Insurance     Medicare Insurance: [employer]

## 2022-07-22 ENCOUNTER — DOCUMENTATION (OUTPATIENT)
Dept: PSYCHIATRY | Facility: CLINIC | Age: 77
End: 2022-07-22

## 2022-07-22 NOTE — PSYCH
Psychiatric Discharge Summary   Discharge Summary: 5/18/22 D/C   Discharge Diagnosis: MDD, MADAN  Treating Physician: Dr Valentine Reddy, Treatment Complications: no  Prognosis at time of discharge: Fair  Presenting Problems/Pertinent Findings: related to diagnoses   Therapist: Gerson Rdz  Course of Treatment: Individual Couseling, Psychiatric Evaluation and Medication Management  Summary of Treatment Progress: fair    To what extent did the consumer achieve their goals? Some  Criteria for Discharge: patient requested discharge  Aftercare Recommendations:  Follow up with therapist and Follow up with pcp  Discharge Medications:   Current Outpatient Medications:     ALPRAZolam (XANAX) 0 5 mg tablet, Take 1 tablet (0 5 mg total) by mouth 2 (two) times a day as needed for anxiety, Disp: 60 tablet, Rfl: 0    aspirin 81 MG tablet, Take 81 mg by mouth daily, Disp: , Rfl:     B-D UF III MINI PEN NEEDLES 31G X 5 MM MISC, , Disp: , Rfl:     BAQSIMI TWO PACK 3 MG/DOSE POWD, Use as directed , Disp: , Rfl: 0    cholecalciferol (VITAMIN D3) 1,000 units tablet, Take 2,000 Units by mouth daily, Disp: , Rfl:     Coenzyme Q10 (Co Q-10) 200 MG CAPS, Take by mouth in the morning, Disp: , Rfl:     diclofenac sodium (VOLTAREN) 1 %, Apply 2 g topically 4 (four) times a day (Patient taking differently: Apply 2 g topically as needed in the morning ), Disp: 1 Tube, Rfl: 0    diltiazem (TIAZAC) 300 MG 24 hr capsule, Take 300 mg by mouth daily, Disp: , Rfl:     docusate sodium (COLACE) 100 mg capsule, Take 1 capsule (100 mg total) by mouth 2 (two) times a day (Patient taking differently: Take 100 mg by mouth as needed in the morning and 100 mg as needed in the evening ), Disp: 20 capsule, Rfl: 0    DULoxetine (CYMBALTA) 60 mg delayed release capsule, TAKE 1 CAPSULE BY MOUTH EVERY DAY, Disp: 90 capsule, Rfl: 1    enalapril (VASOTEC) 20 mg tablet, Take 20 mg by mouth daily at bedtime  , Disp: , Rfl:     insulin aspart (NovoLOG) 100 units/mL injection, Inject 12 Units under the skin 3 (three) times a day before meals (Patient taking differently: Inject under the skin INSULIN PUMP), Disp: , Rfl: 0    Insulin Disposable Pump (OmniPod Dash 5 Pack Pods) MISC, USE 1 POD EVERY 1 5 DAYS, Disp: , Rfl:     Insulin Disposable Pump (OmniPod Dash 5 Pack Pods) MISC, Omnipod Dash Insulin Pod subcutaneous cartridge  USE 1 POD EVERY 1 5 DAYS, Disp: , Rfl:     isosorbide mononitrate (IMDUR) 60 mg 24 hr tablet, TAKE 1 5 TABs AT NIGHT, Disp: , Rfl:     levothyroxine 50 mcg tablet, Take 50 mcg by mouth daily, Disp: , Rfl:     naloxone (NARCAN) 4 mg/0 1 mL nasal spray, Administer 1 spray into a nostril  If no response after 2-3 minutes, give another dose in the other nostril using a new spray  (Patient not taking: No sig reported), Disp: 1 each, Rfl: 1    nitroglycerin (NITROLINGUAL) 0 4 mg/spray spray, USE ONE SPRAY Q 5 MINUTES AS NEEDED FOR CHEST PAIN  IF NO RELIEF, CALL 911 , Disp: , Rfl: 1    NovoLOG 100 UNIT/ML SOLN, INFUSE UNDER THE SKIN VIA INSULIN PUMP AS DIRECTED  TOTAL DAILY DOSE  UNITS, Disp: , Rfl:     rosuvastatin (CRESTOR) 20 MG tablet, Take 20 mg by mouth every evening  , Disp: , Rfl: 2    traZODone (DESYREL) 50 mg tablet, TAKE 0 5-1 TABLETS (25-50 MG TOTAL) BY MOUTH DAILY AT BEDTIME AS NEEDED FOR SLEEP, Disp: 90 tablet, Rfl: 1     Describe consumers ability and willingness to work and solve her mental problem       Substance Abuse History:  Social History     Substance and Sexual Activity   Drug Use No       Family Psychiatric History:   Family History   Problem Relation Age of Onset    Arthritis Mother     Leukemia Mother     Other Mother         Anxiety, major depressive disorder, recurrent episode with atypical features    Coronary artery disease Father         Heart problem    Diabetes Father     Other Father         Infectious disease    Alzheimer's disease Maternal Grandmother     Other Maternal Grandfather         Heart problem    Other Daughter         Anxiety, major depressive disorder, recurrent episode with atypical features    Alcohol abuse Other         Grandparent    Cancer Family     Diabetes Family     Hypertension Family     Other Family         Reported prior back trouble, thyroid disorder       The following portions of the patient's history were reviewed and updated as appropriate: allergies, current medications, past family history, past medical history, past social history, past surgical history and problem list     Social History     Socioeconomic History    Marital status: /Civil Union     Spouse name: Not on file    Number of children: 2    Years of education: High school or GED    Highest education level: Not on file   Occupational History    Occupation: Retired   Tobacco Use    Smoking status: Former Smoker     Packs/day: 1 00     Years: 6 00     Pack years: 6 00     Types: Cigarettes     Quit date:      Years since quittin 5    Smokeless tobacco: Never Used    Tobacco comment: Smoked about a half a pack for about 4 yrs  Quite about 50 yrs ago     Vaping Use    Vaping Use: Never used   Substance and Sexual Activity    Alcohol use: No    Drug use: No    Sexual activity: Not Currently     Comment: No known STD risk factors   Other Topics Concern    Not on file   Social History Narrative    Lives independently with spouse    No known risk factors    Denied:  Exercising regularly     Social Determinants of Health     Financial Resource Strain: Not on file   Food Insecurity: Not on file   Transportation Needs: Not on file   Physical Activity: Not on file   Stress: Not on file   Social Connections: Not on file   Intimate Partner Violence: Not on file   Housing Stability: Not on file     Social History     Social History Narrative    Lives independently with spouse    No known risk factors    Denied:  Exercising regularly       Mental Status at Time of most recent visit on 3/7/2022:    MENTAL STATUS EXAM  Appearance:  age appropriate   Behavior:  pleasant, cooperative, with good eye contact   Speech:  Normal volume, regular rate and rhythm   Mood:  dysphoric, anxious   Affect:  mood congruent, constricted   Language: intact and appropriate for age, education, and intellect   Thought Process:  Linear and goal directed   Associations: intact associations   Thought Content:  normal and appropriate   Perceptual Disturbances: no auditory or visual hallcunations   Risk Potential / Abnormal Thoughts: Suicidal ideation - None  Homicidal ideation - None  Potential for aggression - No         Consciousness:  Alert & Awake   Sensorium:  Grossly oriented   Attention: attention span and concentration are age appropriate         Fund of Knowledge:  Memory: awareness of current events: yes  recent and remote memory grossly intact   Insight:  good   Judgment: good   Muscle Strength Muscle Tone: normal  normal   Gait/Station: uses cane   Motor Activity: no abnormal movements

## 2022-08-01 DIAGNOSIS — F41.9 ANXIETY: ICD-10-CM

## 2022-08-01 DIAGNOSIS — F41.1 GAD (GENERALIZED ANXIETY DISORDER): ICD-10-CM

## 2022-08-01 DIAGNOSIS — F33.2 MDD (MAJOR DEPRESSIVE DISORDER), RECURRENT SEVERE, WITHOUT PSYCHOSIS (HCC): ICD-10-CM

## 2022-08-01 DIAGNOSIS — F41.0 PANIC ATTACKS: ICD-10-CM

## 2022-08-02 ENCOUNTER — SOCIAL WORK (OUTPATIENT)
Dept: BEHAVIORAL/MENTAL HEALTH CLINIC | Facility: CLINIC | Age: 77
End: 2022-08-02
Payer: COMMERCIAL

## 2022-08-02 DIAGNOSIS — F41.9 ANXIETY: Primary | ICD-10-CM

## 2022-08-02 PROCEDURE — 90834 PSYTX W PT 45 MINUTES: CPT | Performed by: SOCIAL WORKER

## 2022-08-02 RX ORDER — ALPRAZOLAM 0.5 MG/1
0.5 TABLET ORAL 2 TIMES DAILY PRN
Qty: 60 TABLET | Refills: 0 | Status: SHIPPED | OUTPATIENT
Start: 2022-08-02 | End: 2022-09-08 | Stop reason: SDUPTHER

## 2022-08-03 NOTE — PSYCH
Psychotherapy Provided: Individual Psychotherapy 50 minutes from 2:10-3:00    Length of time in session: 50minutes, follow up in 4 week    Encounter Diagnosis     ICD-10-CM    1  Anxiety  F41 9        Goals addressed in session: Goal 1     Pain:      none    0    Current suicide risk : Low     D- Rondal Meaghan has been feeling more anxious and overwhelmed since moving into senior living environment  Feeling as if she does not need level of assistance the other residents need and feels out of place  Part of this is likely the change of leaving their home and neighbors as well  In addition,  continues to be largely non-supportive emotionally or assisting with move  Thankfully, her children have been very helpful, her son in particular  Denies any acute depressive symptoms  Move has been challenging due to her physical health issues but she continues to work though them   Otoniel presents as more anxious than previous sessions  She is verbal, cooperative, well oriented and engaged during session  Thoughts are logical and goal directed  P- processed her struggles with move and acclimating to this change which has increased her anxiety- validation and supportive therapy provided  Reinforced need to get move completed and settled in for a couple months before trying to determine if this was right move for them  Reviewed stress mgmt strategies and productive outlets to continue to utilize  Provided possible counseling resources for her   Behavioral Health Treatment Plan [facility]: Diagnosis and Treatment Plan explained to Izzy Stack relates understanding diagnosis and is agreeable to Treatment Plan   Yes

## 2022-08-09 ENCOUNTER — OFFICE VISIT (OUTPATIENT)
Dept: INTERNAL MEDICINE CLINIC | Facility: CLINIC | Age: 77
End: 2022-08-09
Payer: COMMERCIAL

## 2022-08-09 VITALS
RESPIRATION RATE: 16 BRPM | BODY MASS INDEX: 40.08 KG/M2 | OXYGEN SATURATION: 97 % | SYSTOLIC BLOOD PRESSURE: 138 MMHG | DIASTOLIC BLOOD PRESSURE: 82 MMHG | HEART RATE: 73 BPM | HEIGHT: 59 IN | WEIGHT: 198.8 LBS

## 2022-08-09 DIAGNOSIS — M54.16 LUMBAR RADICULOPATHY: ICD-10-CM

## 2022-08-09 DIAGNOSIS — Z79.4 TYPE 2 DIABETES MELLITUS WITH COMPLICATION, WITH LONG-TERM CURRENT USE OF INSULIN (HCC): Primary | Chronic | ICD-10-CM

## 2022-08-09 DIAGNOSIS — E11.42 DIABETIC POLYNEUROPATHY ASSOCIATED WITH TYPE 2 DIABETES MELLITUS (HCC): ICD-10-CM

## 2022-08-09 DIAGNOSIS — N18.32 STAGE 3B CHRONIC KIDNEY DISEASE (HCC): ICD-10-CM

## 2022-08-09 DIAGNOSIS — Z13.820 SCREENING FOR OSTEOPOROSIS: ICD-10-CM

## 2022-08-09 DIAGNOSIS — Z12.31 ENCOUNTER FOR SCREENING MAMMOGRAM FOR BREAST CANCER: ICD-10-CM

## 2022-08-09 DIAGNOSIS — E66.01 CLASS 3 SEVERE OBESITY DUE TO EXCESS CALORIES WITHOUT SERIOUS COMORBIDITY IN ADULT, UNSPECIFIED BMI (HCC): ICD-10-CM

## 2022-08-09 DIAGNOSIS — E78.5 HYPERLIPIDEMIA, UNSPECIFIED HYPERLIPIDEMIA TYPE: Chronic | ICD-10-CM

## 2022-08-09 DIAGNOSIS — F41.1 GAD (GENERALIZED ANXIETY DISORDER): ICD-10-CM

## 2022-08-09 DIAGNOSIS — E11.8 TYPE 2 DIABETES MELLITUS WITH COMPLICATION, WITH LONG-TERM CURRENT USE OF INSULIN (HCC): Primary | Chronic | ICD-10-CM

## 2022-08-09 PROCEDURE — 3079F DIAST BP 80-89 MM HG: CPT | Performed by: INTERNAL MEDICINE

## 2022-08-09 PROCEDURE — 3075F SYST BP GE 130 - 139MM HG: CPT | Performed by: INTERNAL MEDICINE

## 2022-08-09 PROCEDURE — 99214 OFFICE O/P EST MOD 30 MIN: CPT | Performed by: INTERNAL MEDICINE

## 2022-08-09 PROCEDURE — 1160F RVW MEDS BY RX/DR IN RCRD: CPT | Performed by: INTERNAL MEDICINE

## 2022-08-09 NOTE — PROGRESS NOTES
Assessment/Plan:    Type 2 diabetes mellitus with complication, with long-term current use of insulin (HCC)    Lab Results   Component Value Date    HGBA1C 8 3 (H) 05/04/2022   I have counselled the pt to follow a healthy and balanced diet ,and recommend routine exercise  I will be ordering diabetic laboratories including comprehensive metabolic panel, hemoglobin A1c, urine microalbumin, lipid panel  Diabetic polyneuropathy associated with type 2 diabetes mellitus ([facility] [address] )    Lab Results   Component Value Date    HGBA1C 8 3 (H) 05/04/2022   Foot examination completed Counseling was provided regarding the screening test patient is agreeable    Lumbar radiculopathy  Will have patient start physical therapy    Hyperlipidemia  Hyperlipidemia controlled continue with current medical regiment recommend a low-cholesterol diet and recommend routine exercise we will continue to monitor the progress  Obesity  Obesity -I have counseled patient following healthy and balanced diet, I would like the patient to lose weight, I would like the patient exercise routinely; we will continue monitor the patient's progress  MADAN (generalized anxiety disorder)  Ongoing symptoms anxiety which are new more noticeable with the adjustment of her new environment she is now living in an independent facility but not happy with the facility no SI she will continue with Xanax 0 5 mg b i d  p r n , Cymbalta 60 mg once daily         Problem List Items Addressed This Visit        Endocrine    Type 2 diabetes mellitus with complication, with long-term current use of insulin (HCC) - Primary (Chronic)       Lab Results   Component Value Date    HGBA1C 8 3 (H) 05/04/2022   I have counselled the pt to follow a healthy and balanced diet ,and recommend routine exercise  I will be ordering diabetic laboratories including comprehensive metabolic panel, hemoglobin A1c, urine microalbumin, lipid panel           Relevant Orders    Comprehensive metabolic panel    Lipid Panel with Direct LDL reflex    Comprehensive metabolic panel    Hemoglobin A1C    Microalbumin / creatinine urine ratio    Diabetic polyneuropathy associated with type 2 diabetes mellitus (HCC)       Lab Results   Component Value Date    HGBA1C 8 3 (H) 05/04/2022   Foot examination completed Counseling was provided regarding the screening test patient is agreeable            Nervous and Auditory    Lumbar radiculopathy     Will have patient start physical therapy         Relevant Orders    Ambulatory Referral to Physical Therapy       Genitourinary    Stage 3b chronic kidney disease ([facility] [address] )       Other    Hyperlipidemia (Chronic)     Hyperlipidemia controlled continue with current medical regiment recommend a low-cholesterol diet and recommend routine exercise we will continue to monitor the progress  Obesity     Obesity -I have counseled patient following healthy and balanced diet, I would like the patient to lose weight, I would like the patient exercise routinely; we will continue monitor the patient's progress  MADAN (generalized anxiety disorder)     Ongoing symptoms anxiety which are new more noticeable with the adjustment of her new environment she is now living in an independent facility but not happy with the facility no SI she will continue with Xanax 0 5 mg b i d  p r n , Cymbalta 60 mg once daily         Encounter for screening mammogram for breast cancer    Relevant Orders    Mammo screening bilateral w 3d & cad      Other Visit Diagnoses     Screening for osteoporosis        Relevant Orders    DXA bone density spine hip and pelvis        RTO in 4 months call if any problems  Patient's shoes and socks removed  Right Foot/Ankle   Right Foot Inspection  Skin Exam: skin normal and skin intact  No dry skin, no warmth, no callus, no erythema, no maceration, no abnormal color, no pre-ulcer, no ulcer and no callus  Toe Exam: ROM and strength within normal limits       Sensory Monofilament testing: intact    Vascular  Capillary refills: < 3 seconds  The right DP pulse is 2+  The right PT pulse is 2+  Left Foot/Ankle  Left Foot Inspection  Skin Exam: skin normal and skin intact  No dry skin, no warmth, no erythema, no maceration, normal color, no pre-ulcer, no ulcer and no callus  Toe Exam: ROM and strength within normal limits  Sensory   Monofilament testing: intact    Vascular  Capillary refills: < 3 seconds  The left PT pulse is 2+  Assign Risk Category  No deformity present  Loss of protective sensation  No weak pulses  Risk: 1    Subjective:      Patient ID: Kiera Burton is a 77 y o  female  HPI 74-year old female coming in for a follow up office visit regarding diabetes type 2, diabetic polyneuropathy, stage IIIB chronic kidney disease, anxiety/depression lumbar radiculopathy, hyperlipidemia and obesity; The patient reports me compliant taking medications without untoward side effects the  The patient is here to review his medical condition, update me on the medical condition and the patient reports me no hospitalizations and no ER visits  very unhappy with where I live , no SI patient recently moved into independent living facility she feels as if everyone in the facility as much older than her and this is very depressing to her her  is older than her and he is comfortable with the environment but patient is not  She is considering possibly moving out but will give it a little bit time to see if she is just       The following portions of the patient's history were reviewed and updated as appropriate: allergies, current medications, past family history, past medical history, past social history, past surgical history and problem list     Review of Systems   Constitutional: Negative for activity change, appetite change and unexpected weight change  HENT: Negative for congestion and postnasal drip  Eyes: Negative for visual disturbance  Respiratory: Negative for cough and shortness of breath  Cardiovascular: Negative for chest pain  Gastrointestinal: Negative for abdominal pain, diarrhea, nausea and vomiting  Neurological: Negative for dizziness, light-headedness and headaches  Psychiatric/Behavioral: Negative for suicidal ideas  The patient is nervous/anxious  BMI Counseling: Body mass index is 40 15 kg/m²  The BMI is above normal  Nutrition recommendations include decreasing portion sizes and moderation in carbohydrate intake  Exercise recommendations include exercising 3-5 times per week  Rationale for BMI follow-up plan is due to patient being overweight or obese  Objective:      /82   Pulse 73   Resp 16   Ht 4' 11" (1 499 m)   Wt 90 2 kg (198 lb 12 8 oz)   SpO2 97%   BMI 40 15 kg/m²          Physical Exam  Constitutional:       Appearance: She is well-developed  HENT:      Head: Normocephalic  Eyes:      General: No scleral icterus  Right eye: No discharge  Left eye: No discharge  Conjunctiva/sclera: Conjunctivae normal       Pupils: Pupils are equal, round, and reactive to light  Cardiovascular:      Rate and Rhythm: Normal rate and regular rhythm  Pulses: no weak pulses          Dorsalis pedis pulses are 2+ on the right side  Posterior tibial pulses are 2+ on the right side and 2+ on the left side  Heart sounds: Normal heart sounds  No murmur heard  No friction rub  No gallop  Pulmonary:      Effort: No respiratory distress  Breath sounds: Normal breath sounds  No wheezing or rales  Abdominal:      General: Bowel sounds are normal  There is no distension  Palpations: Abdomen is soft  There is no mass  Tenderness: There is no abdominal tenderness  There is no guarding or rebound  Musculoskeletal:         General: No deformity  Cervical back: Neck supple     Feet:      Right foot:      Skin integrity: No ulcer, skin breakdown, erythema, warmth, callus or dry skin  Left foot:      Skin integrity: No ulcer, skin breakdown, erythema, warmth, callus or dry skin  Lymphadenopathy:      Cervical: No cervical adenopathy  Neurological:      Mental Status: She is alert  Coordination: Coordination normal    Psychiatric:         Mood and Affect: Mood is anxious  Mood is not depressed  Thought Content: Thought content does not include suicidal ideation

## 2022-08-14 NOTE — ASSESSMENT & PLAN NOTE
Ongoing symptoms anxiety which are new more noticeable with the adjustment of her new environment she is now living in an independent facility but not happy with the facility no SI she will continue with Xanax 0 5 mg b i d  p r n , Cymbalta 60 mg once daily

## 2022-08-14 NOTE — ASSESSMENT & PLAN NOTE
Lab Results   Component Value Date    HGBA1C 8 3 (H) 05/04/2022   Foot examination completed Counseling was provided regarding the screening test patient is agreeable

## 2022-08-14 NOTE — ASSESSMENT & PLAN NOTE
Lab Results   Component Value Date    HGBA1C 8 3 (H) 05/04/2022   I have counselled the pt to follow a healthy and balanced diet ,and recommend routine exercise  I will be ordering diabetic laboratories including comprehensive metabolic panel, hemoglobin A1c, urine microalbumin, lipid panel

## 2022-08-14 NOTE — PATIENT INSTRUCTIONS

## 2022-08-16 ENCOUNTER — TELEPHONE (OUTPATIENT)
Dept: INTERNAL MEDICINE CLINIC | Facility: CLINIC | Age: 77
End: 2022-08-16

## 2022-08-16 NOTE — TELEPHONE ENCOUNTER
Elie Argueta ([phone]) from [facility] received order for PT/OT referral but patients insurance is requesting a letter of medical necessity?      Fax [phone]

## 2022-08-30 ENCOUNTER — SOCIAL WORK (OUTPATIENT)
Dept: BEHAVIORAL/MENTAL HEALTH CLINIC | Facility: CLINIC | Age: 77
End: 2022-08-30
Payer: COMMERCIAL

## 2022-08-30 DIAGNOSIS — F41.9 ANXIETY: Primary | ICD-10-CM

## 2022-08-30 PROCEDURE — 90834 PSYTX W PT 45 MINUTES: CPT | Performed by: SOCIAL WORKER

## 2022-08-31 NOTE — PSYCH
Psychotherapy Provided: Individual Psychotherapy 60 minutes from 2:45-3:45  Length of time in session: 60 minutes, follow up in 4 weeks    Encounter Diagnosis     ICD-10-CM    1  Anxiety  F41 9        Goals addressed in session: Goal 1     Pain:      none    0    Current suicide risk : Low     D- rEin Coreas has struggled to manage stress and anxiety since moving into supported living facility, which in her mind  Resembles nursing home placement  Since last seen, she and her  put bid in on a row home in [facility] and will have settlement next month  This alleviated a great deal of stress and anxiety  Children and grandchildren have had carrie contact with her since being in current living arrangement due to their own lack of comfort with the situation  This will change upon their move  Denies any acute issues with anxiety or depression  Senecajessica Estrada presents as anxious but is pleasant, verbal, cooperative, well oriented and engaged during session  Making positive statements about future  Very much looking forward to move  P- processed her struggles with stress and anxiety stemming from current living environment- validation and supportive therapy provided  Reviewed stress mgmt strategies and productive outlets to utilize in response  Behavioral Health Treatment Plan [facility]: Diagnosis and Treatment Plan explained to Jen Metzger relates understanding diagnosis and is agreeable to Treatment Plan   Yes

## 2022-09-08 DIAGNOSIS — F41.1 GAD (GENERALIZED ANXIETY DISORDER): ICD-10-CM

## 2022-09-08 DIAGNOSIS — F41.9 ANXIETY: ICD-10-CM

## 2022-09-08 DIAGNOSIS — F41.0 PANIC ATTACKS: ICD-10-CM

## 2022-09-08 NOTE — TELEPHONE ENCOUNTER
Medication Refill Request     Name ALPRAZolam Abel Carton) 0 5 mg tablet  Dose/Frequency 2 x daily  Quantity 60 tablets  Verified pharmacy   [x]  Verified ordering Provider   [x]  Does patient have enough for the next 3 days?  Yes [] No [x]

## 2022-09-12 RX ORDER — ALPRAZOLAM 0.5 MG/1
0.5 TABLET ORAL 2 TIMES DAILY PRN
Qty: 60 TABLET | Refills: 0 | Status: SHIPPED | OUTPATIENT
Start: 2022-09-12 | End: 2022-10-14 | Stop reason: SDUPTHER

## 2022-10-01 ENCOUNTER — IMMUNIZATIONS (OUTPATIENT)
Dept: INTERNAL MEDICINE CLINIC | Facility: CLINIC | Age: 77
End: 2022-10-01
Payer: COMMERCIAL

## 2022-10-01 DIAGNOSIS — Z23 ENCOUNTER FOR IMMUNIZATION: Primary | ICD-10-CM

## 2022-10-01 PROCEDURE — 90662 IIV NO PRSV INCREASED AG IM: CPT

## 2022-10-01 PROCEDURE — G0008 ADMIN INFLUENZA VIRUS VAC: HCPCS

## 2022-10-14 DIAGNOSIS — F41.9 ANXIETY: ICD-10-CM

## 2022-10-14 DIAGNOSIS — F33.2 MDD (MAJOR DEPRESSIVE DISORDER), RECURRENT SEVERE, WITHOUT PSYCHOSIS (HCC): ICD-10-CM

## 2022-10-14 DIAGNOSIS — F41.1 GAD (GENERALIZED ANXIETY DISORDER): ICD-10-CM

## 2022-10-14 DIAGNOSIS — F41.0 PANIC ATTACKS: ICD-10-CM

## 2022-10-14 RX ORDER — ALPRAZOLAM 0.5 MG/1
0.5 TABLET ORAL 2 TIMES DAILY PRN
Qty: 60 TABLET | Refills: 0 | Status: SHIPPED | OUTPATIENT
Start: 2022-10-14

## 2022-10-14 RX ORDER — DULOXETIN HYDROCHLORIDE 60 MG/1
60 CAPSULE, DELAYED RELEASE ORAL DAILY
Qty: 90 CAPSULE | Refills: 3 | Status: SHIPPED | OUTPATIENT
Start: 2022-10-14

## 2022-10-27 ENCOUNTER — HOSPITAL ENCOUNTER (EMERGENCY)
Facility: HOSPITAL | Age: 77
Discharge: HOME | End: 2022-10-27
Attending: EMERGENCY MEDICINE
Payer: COMMERCIAL

## 2022-10-27 ENCOUNTER — APPOINTMENT (EMERGENCY)
Dept: RADIOLOGY | Facility: HOSPITAL | Age: 77
End: 2022-10-27
Payer: COMMERCIAL

## 2022-10-27 ENCOUNTER — APPOINTMENT (EMERGENCY)
Dept: CT IMAGING | Facility: HOSPITAL | Age: 77
End: 2022-10-27
Payer: COMMERCIAL

## 2022-10-27 VITALS
RESPIRATION RATE: 18 BRPM | TEMPERATURE: 98.2 F | OXYGEN SATURATION: 94 % | DIASTOLIC BLOOD PRESSURE: 74 MMHG | SYSTOLIC BLOOD PRESSURE: 162 MMHG | HEART RATE: 82 BPM

## 2022-10-27 DIAGNOSIS — R29.6 MULTIPLE FALLS: Primary | ICD-10-CM

## 2022-10-27 LAB
ANION GAP SERPL CALCULATED.3IONS-SCNC: 11 MMOL/L (ref 4–13)
BASOPHILS # BLD AUTO: 0.04 THOUSANDS/ÂΜL (ref 0–0.1)
BASOPHILS NFR BLD AUTO: 0 % (ref 0–1)
BUN SERPL-MCNC: 18 MG/DL (ref 5–25)
CALCIUM SERPL-MCNC: 9.8 MG/DL (ref 8.4–10.2)
CHLORIDE SERPL-SCNC: 100 MMOL/L (ref 96–108)
CO2 SERPL-SCNC: 24 MMOL/L (ref 21–32)
CREAT SERPL-MCNC: 1.92 MG/DL (ref 0.6–1.3)
EOSINOPHIL # BLD AUTO: 0.01 THOUSAND/ÂΜL (ref 0–0.61)
EOSINOPHIL NFR BLD AUTO: 0 % (ref 0–6)
ERYTHROCYTE [DISTWIDTH] IN BLOOD BY AUTOMATED COUNT: 15.2 % (ref 11.6–15.1)
GFR SERPL CREATININE-BSD FRML MDRD: 25 ML/MIN/1.73SQ M
GLUCOSE SERPL-MCNC: 231 MG/DL (ref 65–140)
GLUCOSE SERPL-MCNC: 266 MG/DL (ref 65–140)
HCT VFR BLD AUTO: 40.9 % (ref 34.8–46.1)
HGB BLD-MCNC: 13.1 G/DL (ref 11.5–15.4)
IMM GRANULOCYTES # BLD AUTO: 0.09 THOUSAND/UL (ref 0–0.2)
IMM GRANULOCYTES NFR BLD AUTO: 1 % (ref 0–2)
LYMPHOCYTES # BLD AUTO: 2.6 THOUSANDS/ÂΜL (ref 0.6–4.47)
LYMPHOCYTES NFR BLD AUTO: 22 % (ref 14–44)
MCH RBC QN AUTO: 26.2 PG (ref 26.8–34.3)
MCHC RBC AUTO-ENTMCNC: 32 G/DL (ref 31.4–37.4)
MCV RBC AUTO: 82 FL (ref 82–98)
MONOCYTES # BLD AUTO: 0.96 THOUSAND/ÂΜL (ref 0.17–1.22)
MONOCYTES NFR BLD AUTO: 8 % (ref 4–12)
NEUTROPHILS # BLD AUTO: 8.01 THOUSANDS/ÂΜL (ref 1.85–7.62)
NEUTS SEG NFR BLD AUTO: 69 % (ref 43–75)
NRBC BLD AUTO-RTO: 0 /100 WBCS
PLATELET # BLD AUTO: 260 THOUSANDS/UL (ref 149–390)
PMV BLD AUTO: 11.1 FL (ref 8.9–12.7)
POTASSIUM SERPL-SCNC: 3.6 MMOL/L (ref 3.5–5.3)
RBC # BLD AUTO: 5 MILLION/UL (ref 3.81–5.12)
SODIUM SERPL-SCNC: 135 MMOL/L (ref 135–147)
WBC # BLD AUTO: 11.71 THOUSAND/UL (ref 4.31–10.16)

## 2022-10-27 PROCEDURE — 36415 COLL VENOUS BLD VENIPUNCTURE: CPT | Performed by: EMERGENCY MEDICINE

## 2022-10-27 PROCEDURE — 80048 BASIC METABOLIC PNL TOTAL CA: CPT | Performed by: EMERGENCY MEDICINE

## 2022-10-27 PROCEDURE — 71045 X-RAY EXAM CHEST 1 VIEW: CPT

## 2022-10-27 PROCEDURE — 82948 REAGENT STRIP/BLOOD GLUCOSE: CPT

## 2022-10-27 PROCEDURE — 70450 CT HEAD/BRAIN W/O DYE: CPT

## 2022-10-27 PROCEDURE — G1004 CDSM NDSC: HCPCS

## 2022-10-27 PROCEDURE — 85025 COMPLETE CBC W/AUTO DIFF WBC: CPT | Performed by: EMERGENCY MEDICINE

## 2022-10-27 RX ORDER — ACETAMINOPHEN 325 MG/1
975 TABLET ORAL ONCE
Status: COMPLETED | OUTPATIENT
Start: 2022-10-27 | End: 2022-10-27

## 2022-10-27 RX ADMIN — ACETAMINOPHEN 975 MG: 325 TABLET ORAL at 15:16
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
## 2022-10-28 ENCOUNTER — VBI (OUTPATIENT)
Dept: INTERNAL MEDICINE CLINIC | Facility: CLINIC | Age: 77
End: 2022-10-28

## 2022-10-28 NOTE — TELEPHONE ENCOUNTER
Hanna Martinez    ED Visit Information     Ed visit date: 10-   Diagnosis Description: Multiple falls      In Network? Yes [address] [facility]  Discharge status: Home  Discharged with meds ? No  Number of ED visits to date: 2  ED Severity:3     Outreach Information    Outreach successful: No 1  Date letter mailed: Patient called PCP 10- 3:02pm scheduled PCP FU 11-1-2022  Date Finalized: 10-    Care Coordination    Follow up appointment with pcp: yes Patient called PCP 10- 3:02pm scheduled PCP FU 11-1-2022  Transportation issues ?  NA    Value Base Outreach    Outreach type: 3 Day Outreach  Emergent necessity warranted by diagnosis: Yes  [facility] PCP: Yes  Communication consent can only leave VM at home phone not cell phone          10/28/2022 09:51 AM Phone (VBI) Barb Cook (Self) [phone] (H) Remove  Left Message - LM asking for call back for ED FU    By Rosita Cheadle, MA    Patient called PCP 10- 3:02pm scheduled PCP FU 11-1-2022

## 2022-10-31 RX ORDER — INSULIN GLARGINE 100 [IU]/ML
INJECTION, SOLUTION SUBCUTANEOUS
COMMUNITY
Start: 2022-10-05

## 2022-10-31 RX ORDER — IRBESARTAN 300 MG/1
300 TABLET ORAL
COMMUNITY
Start: 2022-10-10 | End: 2023-10-10

## 2022-10-31 RX ORDER — PEN NEEDLE, DIABETIC 29 G X1/2"
NEEDLE, DISPOSABLE MISCELLANEOUS 4 TIMES DAILY
COMMUNITY
Start: 2022-08-22

## 2022-11-01 ENCOUNTER — OFFICE VISIT (OUTPATIENT)
Dept: INTERNAL MEDICINE CLINIC | Facility: CLINIC | Age: 77
End: 2022-11-01

## 2022-11-01 ENCOUNTER — SOCIAL WORK (OUTPATIENT)
Dept: BEHAVIORAL/MENTAL HEALTH CLINIC | Facility: CLINIC | Age: 77
End: 2022-11-01

## 2022-11-01 VITALS
HEART RATE: 94 BPM | OXYGEN SATURATION: 96 % | HEIGHT: 59 IN | BODY MASS INDEX: 38.87 KG/M2 | WEIGHT: 192.8 LBS | RESPIRATION RATE: 16 BRPM | DIASTOLIC BLOOD PRESSURE: 70 MMHG | SYSTOLIC BLOOD PRESSURE: 130 MMHG

## 2022-11-01 DIAGNOSIS — E66.01 CLASS 3 SEVERE OBESITY DUE TO EXCESS CALORIES WITHOUT SERIOUS COMORBIDITY IN ADULT, UNSPECIFIED BMI (HCC): ICD-10-CM

## 2022-11-01 DIAGNOSIS — Z79.4 TYPE 2 DIABETES MELLITUS WITH COMPLICATION, WITH LONG-TERM CURRENT USE OF INSULIN (HCC): Primary | Chronic | ICD-10-CM

## 2022-11-01 DIAGNOSIS — E78.2 MIXED HYPERLIPIDEMIA: ICD-10-CM

## 2022-11-01 DIAGNOSIS — F41.9 ANXIETY: Primary | ICD-10-CM

## 2022-11-01 DIAGNOSIS — F41.9 ANXIETY: ICD-10-CM

## 2022-11-01 DIAGNOSIS — N18.4 CHRONIC RENAL IMPAIRMENT, STAGE 4 (SEVERE) (HCC): ICD-10-CM

## 2022-11-01 DIAGNOSIS — E11.8 TYPE 2 DIABETES MELLITUS WITH COMPLICATION, WITH LONG-TERM CURRENT USE OF INSULIN (HCC): Primary | Chronic | ICD-10-CM

## 2022-11-01 DIAGNOSIS — Z12.11 SCREENING FOR COLON CANCER: ICD-10-CM

## 2022-11-01 DIAGNOSIS — E03.9 HYPOTHYROIDISM, UNSPECIFIED TYPE: ICD-10-CM

## 2022-11-01 DIAGNOSIS — E78.5 HYPERLIPIDEMIA, UNSPECIFIED HYPERLIPIDEMIA TYPE: Chronic | ICD-10-CM

## 2022-11-01 DIAGNOSIS — I10 ESSENTIAL HYPERTENSION: Chronic | ICD-10-CM

## 2022-11-01 DIAGNOSIS — R79.89 ELEVATED SERUM CREATININE: ICD-10-CM

## 2022-11-01 DIAGNOSIS — N18.32 STAGE 3B CHRONIC KIDNEY DISEASE (HCC): ICD-10-CM

## 2022-11-01 NOTE — ED ATTENDING ATTESTATION
10/27/2022  IVijay MD, saw and evaluated the patient  I have discussed the patient with the resident/non-physician practitioner and agree with the resident's/non-physician practitioner's findings, Plan of Care, and MDM as documented in the resident's/non-physician practitioner's note, except where noted  All available labs and Radiology studies were reviewed  I was present for key portions of any procedure(s) performed by the resident/non-physician practitioner and I was immediately available to provide assistance  At this point I agree with the current assessment done in the Emergency Department    I have conducted an independent evaluation of this patient a history and physical is as follows:see h amd p above- agree with er resident kenny Retana Flies / [address] [facility]    ED Course  ED Course as of 11/01/22 1817   Thu Oct 27, 2022   1715 Er md not-e cxr reviewed by me-- nad          Critical Care Time  Procedures

## 2022-11-01 NOTE — PROGRESS NOTES
Assessment/Plan:    Type 2 diabetes mellitus with complication, with long-term current use of insulin ([facility] [address] )    Lab Results   Component Value Date    HGBA1C 8 6 (H) 09/02/2022   I have counselled the pt to follow a healthy and balanced diet ,and recommend routine exercise  Target A1c under 7 working with endocrinology    Hypothyroidism  Hypothyroidism controlled the patient is currently euthyroid I will be ordering a TSH prior to the next office visit and the patient will continue with current medical regiment; we will continue to monitor the patient's progress  Essential hypertension  Hypertension - controlled, I have counseled patient following healthy balance diet, I would like the patient reduce sodium, exercise routinely, I would like the patient continued the med current medical regiment and we will continue to monitor  Chronic renal impairment, stage 4 (severe) (HCC)  Lab Results   Component Value Date    EGFR 25 10/27/2022    EGFR 38 05/05/2022    EGFR 37 09/17/2021    CREATININE 1 92 (H) 10/27/2022    CREATININE 1 35 (H) 05/05/2022    CREATININE 1 40 (H) 12/13/2021   Drink adequate amount of water, avoid anti-inflammatories, reduce sodium in the diet and will continue to monitor the kidney function    Hyperlipidemia  I have counselled the pt to follow a healthy and balanced diet ,and recommend routine exercise  Low-cholesterol diet    Obesity  Obesity -I have counseled patient following healthy and balanced diet, I would like the patient to lose weight, I would like the patient exercise routinely; we will continue monitor the patient's progress  Anxiety  With a component of adjustment disorder/generalized anxiety disorder no SI currently in the process of moving into her new home Clinically stable and doing well continue the current medical regiment will continue monitor    No SI provided Counseling    Elevated serum creatinine  Recent increase of the creatinine I would like the patient drink 4-6 glasses of water daily reduce sodium in avoid anti-inflammatories will check a BMP in 1 week         Problem List Items Addressed This Visit        Endocrine    Type 2 diabetes mellitus with complication, with long-term current use of insulin (HCC) - Primary (Chronic)       Lab Results   Component Value Date    HGBA1C 8 6 (H) 09/02/2022   I have counselled the pt to follow a healthy and balanced diet ,and recommend routine exercise  Target A1c under 7 working with endocrinology         Relevant Medications    Lantus 100 UNIT/ML subcutaneous injection    Other Relevant Orders    Ambulatory referral to Diabetic Education    Basic metabolic panel    Hypothyroidism     Hypothyroidism controlled the patient is currently euthyroid I will be ordering a TSH prior to the next office visit and the patient will continue with current medical regiment; we will continue to monitor the patient's progress  Relevant Orders    TSH, 3rd generation       Cardiovascular and Mediastinum    Essential hypertension (Chronic)     Hypertension - controlled, I have counseled patient following healthy balance diet, I would like the patient reduce sodium, exercise routinely, I would like the patient continued the med current medical regiment and we will continue to monitor  Relevant Medications    irbesartan (AVAPRO) 300 mg tablet       Genitourinary    Chronic renal impairment, stage 4 (severe) ([facility])     Lab Results   Component Value Date    EGFR 25 10/27/2022    EGFR 38 05/05/2022    EGFR 37 09/17/2021    CREATININE 1 92 (H) 10/27/2022    CREATININE 1 35 (H) 05/05/2022    CREATININE 1 40 (H) 12/13/2021   Drink adequate amount of water, avoid anti-inflammatories, reduce sodium in the diet and will continue to monitor the kidney function         Stage 3b chronic kidney disease ([address] )       Other    Hyperlipidemia (Chronic)     I have counselled the pt to follow a healthy and balanced diet ,and recommend routine exercise  Low-cholesterol diet         Obesity     Obesity -I have counseled patient following healthy and balanced diet, I would like the patient to lose weight, I would like the patient exercise routinely; we will continue monitor the patient's progress  Mixed hyperlipidemia    Anxiety     With a component of adjustment disorder/generalized anxiety disorder no SI currently in the process of moving into her new home Clinically stable and doing well continue the current medical regiment will continue monitor  No SI provided Counseling         Elevated serum creatinine     Recent increase of the creatinine I would like the patient drink 4-6 glasses of water daily reduce sodium in avoid anti-inflammatories will check a BMP in 1 week         Relevant Orders    Basic metabolic panel      Other Visit Diagnoses     Screening for colon cancer        Relevant Orders    Ambulatory Referral to Colorectal Surgery          Return to office 3  months  call if any problems  Subjective:      Patient ID: Criss Kurtz is a 77 y o  female  HPI 74-year old female coming in for a follow up office visit regarding lumbar radiculopathy, hyperlipidemia, obesity, generalized anxiety disorder; The patient reports me compliant taking medications without untoward side effects the  The patient is here to review his medical condition, update me on the medical condition and the patient reports me no hospitalizations and no ER visits  Will see endo Dr Leo Oliveira tomorrow reports me would like to hold off on shingles vaccine because of ongoing symptoms of post herpetic    The following portions of the patient's history were reviewed and updated as appropriate: allergies, current medications, past family history, past medical history, past social history, past surgical history and problem list     Review of Systems   Constitutional: Negative for activity change, appetite change and unexpected weight change     HENT: Negative for congestion and postnasal drip  Eyes: Negative for visual disturbance  Respiratory: Negative for cough and shortness of breath  Cardiovascular: Negative for chest pain  Gastrointestinal: Negative for abdominal pain, diarrhea, nausea and vomiting  Neurological: Negative for dizziness, light-headedness and headaches  Hematological: Negative for adenopathy  Objective:    Return in about 6 months (around 5/1/2023)  Procedure: XR chest 1 view portable    Result Date: 10/27/2022  Narrative: CHEST INDICATION:   Right rib pain after fall  COMPARISON:  Chest radiograph the April 24, 2018 EXAM PERFORMED/VIEWS:  XR CHEST PORTABLE FINDINGS:  There are median sternotomy wires indicating prior cardiac surgery  Cardiomediastinal silhouette appears unremarkable  The lungs are clear  No pneumothorax or pleural effusion  No acute osseous abnormality identified within limitations of portable radiography,     Impression: No radiographic evidence of acute thoracic injury  If there is persistent concern for a rib fracture, dedicated rib series radiographs can be considered  Workstation performed: [id number]     Procedure: CT head without contrast    Result Date: 10/27/2022  Narrative: CT BRAIN - WITHOUT CONTRAST INDICATION:   Head trauma, moderate-severe Fall with head strike  COMPARISON:  6/24/2019 TECHNIQUE:  CT examination of the brain was performed  In addition to axial images, sagittal and coronal 2D reformatted images were created and submitted for interpretation  Radiation dose length product (DLP) for this visit:  927 mGy-cm   This examination, like all CT scans performed in the [facility], was performed utilizing techniques to minimize radiation dose exposure, including the use of iterative reconstruction and automated exposure control  IMAGE QUALITY:  Diagnostic   FINDINGS: PARENCHYMA:  Small densely calcified dural based extra-axial mass lateral to the right temporal lobe on series 302, image 11 is stable measuring 4 mm possibly a small focus of dural ossification or a tiny meningioma  No significant mass effect or associated edema  Mild periventricular hypodensities elsewhere are stable  Atherosclerotic calcifications of the cavernous segment of the internal carotid artery are mild  VENTRICLES AND EXTRA-AXIAL SPACES:  Normal for the patient's age  VISUALIZED ORBITS AND PARANASAL SINUSES:  Unremarkable  CALVARIUM AND EXTRACRANIAL SOFT TISSUES:  Hyperostosis frontalis interna noted  Impression: No intracranial hemorrhage or calvarial fracture   Workstation performed: [id number]          Allergies   Allergen Reactions   • Molds & Smuts Allergic Rhinitis   • Other Allergic Rhinitis     RAGWEED, CAT DANDER, DOG DANDER    • Pollen Extract Other (See Comments)     Cold symptoms         Past Medical History:   Diagnosis Date   • Acute embolism and thrombosis of unspecified deep veins of unspecified lower extremity (HCC)     Last Assessed:  5/18/17   • Anemia    • Anosmia    • Anxiety    • Arthritis    • Asthma    • Back pain    • Bilateral macular retinal edema    • CAD (coronary artery disease)    • Cataract    • Cervical disc herniation    • Cervical radiculopathy    • Cervical spinal stenosis    • Cervical spondylolysis    • Chronic kidney disease    • Chronic mastoiditis    • Colon polyp    • Complex endometrial hyperplasia    • Depression    • Diabetes mellitus (HCC)    • Disease of thyroid gland    • DVT (deep venous thrombosis) ([facility])    • Fibromyalgia    • Fibromyalgia, primary    • Hyperlipidemia    • Hypertension    • Hypothyroid    • Kidney stone    • Lumbar radiculopathy    • Neck pain    • Obese    • PONV (postoperative nausea and vomiting)    • Shingles 07/01/2021   • Spinal stenosis    • Stomach ulcer    • Thyroid disease      Past Surgical History:   Procedure Laterality Date   • BACK SURGERY     • CARPAL TUNNEL RELEASE     • CATARACT EXTRACTION     • CHOLECYSTECTOMY     • COLONOSCOPY     • CORONARY ANGIOPLASTY     • CORONARY ARTERY BYPASS GRAFT     • CYSTOSCOPY N/A 6/20/2017    Procedure: CYSTOSCOPY;  Surgeon: Hayder Veronica MD;  Location: [facility];  Service: Gynecology Oncology   • CYSTOSCOPY     • CT LAPAROSCOPY W TOT HYSTERECTUTERUS <=250 GRAM  W TUBE/OVARY N/A 6/20/2017    Procedure: ROBOTIC HYSTERECTOMY; BILATERAL SALPINO-OOPHERECTOMY; umbilical hernia repair ;  Surgeon: Hayder Veronica MD;  Location: [facility];  Service: Gynecology Oncology   • [address] N/A 5/12/2022    Procedure: REPAIR HERNIA INCISIONAL;  Surgeon: Harry Wilkerson DO;  Location: [facility];  Service: General   • TONSILECTOMY AND ADNOIDECTOMY     • TONSILLECTOMY     • UMBILICAL HERNIA REPAIR       Current Outpatient Medications on File Prior to Visit   Medication Sig Dispense Refill   • ALPRAZolam (XANAX) 0 5 mg tablet Take 1 tablet (0 5 mg total) by mouth 2 (two) times a day as needed for anxiety 60 tablet 0   • aspirin 81 MG tablet Take 81 mg by mouth daily     • B-D UF III MINI PEN NEEDLES 31G X 5 MM MISC      • BAQSIMI TWO PACK 3 MG/DOSE POWD Use as directed   0   • BD Insulin Syringe U/F 31G X 5/16" 0 5 ML MISC 4 (four) times a day As directed     • cholecalciferol (VITAMIN D3) 1,000 units tablet Take 2,000 Units by mouth daily     • Coenzyme Q10 (Co Q-10) 200 MG CAPS Take by mouth in the morning     • diclofenac sodium (VOLTAREN) 1 % Apply 2 g topically 4 (four) times a day (Patient taking differently: Apply 2 g topically as needed) 1 Tube 0   • diltiazem (TIAZAC) 300 MG 24 hr capsule Take 300 mg by mouth daily     • docusate sodium (COLACE) 100 mg capsule Take 1 capsule (100 mg total) by mouth 2 (two) times a day (Patient taking differently: Take 100 mg by mouth 2 (two) times a day as needed) 20 capsule 0   • DULoxetine (CYMBALTA) 60 mg delayed release capsule Take 1 capsule (60 mg total) by mouth daily 90 capsule 3   • enalapril (VASOTEC) 20 mg tablet Take 20 mg by mouth daily at bedtime       • insulin aspart (NovoLOG) 100 units/mL injection Inject 12 Units under the skin 3 (three) times a day before meals (Patient taking differently: Inject under the skin INSULIN PUMP)  0   • Insulin Disposable Pump (OmniPod Dash 5 Pack Pods) MISC USE 1 POD EVERY 1 5 DAYS     • Insulin Disposable Pump (OmniPod Dash 5 Pack Pods) MISC Omnipod Dash Insulin Pod subcutaneous cartridge   USE 1 POD EVERY 1 5 DAYS     • irbesartan (AVAPRO) 300 mg tablet Take 300 mg by mouth     • isosorbide mononitrate (IMDUR) 60 mg 24 hr tablet TAKE 1 5 TABs AT NIGHT     • Lantus 100 UNIT/ML subcutaneous injection      • levothyroxine 50 mcg tablet Take 50 mcg by mouth daily     • naloxone (NARCAN) 4 mg/0 1 mL nasal spray Administer 1 spray into a nostril  If no response after 2-3 minutes, give another dose in the other nostril using a new spray  1 each 1   • nitroglycerin (NITROLINGUAL) 0 4 mg/spray spray USE ONE SPRAY Q 5 MINUTES AS NEEDED FOR CHEST PAIN  IF NO RELIEF, CALL 911   1   • NovoLOG 100 UNIT/ML SOLN INFUSE UNDER THE SKIN VIA INSULIN PUMP AS DIRECTED  TOTAL DAILY DOSE  UNITS     • rosuvastatin (CRESTOR) 20 MG tablet Take 20 mg by mouth every evening    2   • traZODone (DESYREL) 50 mg tablet TAKE 0 5-1 TABLETS (25-50 MG TOTAL) BY MOUTH DAILY AT BEDTIME AS NEEDED FOR SLEEP 90 tablet 1     No current facility-administered medications on file prior to visit       Family History   Problem Relation Age of Onset   • Arthritis Mother    • Leukemia Mother    • Other Mother         Anxiety, major depressive disorder, recurrent episode with atypical features   • Coronary artery disease Father         Heart problem   • Diabetes Father    • Other Father         Infectious disease   • Alzheimer's disease Maternal Grandmother    • Other Maternal Grandfather         Heart problem   • Other Daughter         Anxiety, major depressive disorder, recurrent episode with atypical features   • Alcohol abuse Other         Grandparent   • Cancer Family    • Diabetes Family    • Hypertension Family    • Other Family         Reported prior back trouble, thyroid disorder     Social History     Socioeconomic History   • Marital status: /Civil Union     Spouse name: Not on file   • Number of children: 2   • Years of education: High school or GED   • Highest education level: Not on file   Occupational History   • Occupation: Retired   Tobacco Use   • Smoking status: Former Smoker     Packs/day: 1 00     Years: 6 00     Pack years: 6 00     Types: Cigarettes     Quit date:      Years since quittin 8   • Smokeless tobacco: Never Used   • Tobacco comment: Smoked about a half a pack for about 4 yrs  Quite about 50 yrs ago     Vaping Use   • Vaping Use: Never used   Substance and Sexual Activity   • Alcohol use: No   • Drug use: No   • Sexual activity: Not Currently     Comment: No known STD risk factors   Other Topics Concern   • Not on file   Social History Narrative    Lives independently with spouse    No known risk factors    Denied:  Exercising regularly     Social Determinants of Health     Financial Resource Strain: Not on file   Food Insecurity: Not on file   Transportation Needs: Not on file   Physical Activity: Not on file   Stress: Not on file   Social Connections: Not on file   Intimate Partner Violence: Not on file   Housing Stability: Not on file     Vitals:    22 1401   BP: 130/70   Pulse: 94   Resp: 16   SpO2: 96%   Weight: 87 5 kg (192 lb 12 8 oz)   Height: 4' 11" (1 499 m)     Results for orders placed or performed during the hospital encounter of 10/27/22   CBC and differential   Result Value Ref Range    WBC 11 71 (H) 4 31 - 10 16 Thousand/uL    RBC 5 00 3 81 - 5 12 Million/uL    Hemoglobin 13 1 11 5 - 15 4 g/dL    Hematocrit 40 9 34 8 - 46 1 %    MCV 82 82 - 98 fL    MCH 26 2 (L) 26 8 - 34 3 pg    MCHC 32 0 31 4 - 37 4 g/dL    RDW 15 2 (H) 11 6 - 15 1 %    MPV 11 1 8 9 - 12 7 fL    Platelets 347 547 - 130 Thousands/uL nRBC 0 /100 WBCs    Neutrophils Relative 69 43 - 75 %    Immat GRANS % 1 0 - 2 %    Lymphocytes Relative 22 14 - 44 %    Monocytes Relative 8 4 - 12 %    Eosinophils Relative 0 0 - 6 %    Basophils Relative 0 0 - 1 %    Neutrophils Absolute 8 01 (H) 1 85 - 7 62 Thousands/µL    Immature Grans Absolute 0 09 0 00 - 0 20 Thousand/uL    Lymphocytes Absolute 2 60 0 60 - 4 47 Thousands/µL    Monocytes Absolute 0 96 0 17 - 1 22 Thousand/µL    Eosinophils Absolute 0 01 0 00 - 0 61 Thousand/µL    Basophils Absolute 0 04 0 00 - 0 10 Thousands/µL   Basic metabolic panel   Result Value Ref Range    Sodium 135 135 - 147 mmol/L    Potassium 3 6 3 5 - 5 3 mmol/L    Chloride 100 96 - 108 mmol/L    CO2 24 21 - 32 mmol/L    ANION GAP 11 4 - 13 mmol/L    BUN 18 5 - 25 mg/dL    Creatinine 1 92 (H) 0 60 - 1 30 mg/dL    Glucose 231 (H) 65 - 140 mg/dL    Calcium 9 8 8 4 - 10 2 mg/dL    eGFR 25 ml/min/1 73sq m   Fingerstick Glucose (POCT)   Result Value Ref Range    POC Glucose 266 (H) 65 - 140 mg/dl     Weight (last 2 days)     Date/Time Weight    11/01/22 1401 87 5 (192 8)        Body mass index is 38 94 kg/m²  BP      Temp      Pulse     Resp      SpO2        Vitals:    11/01/22 1401   Weight: 87 5 kg (192 lb 12 8 oz)     Vitals:    11/01/22 1401   Weight: 87 5 kg (192 lb 12 8 oz)       /70   Pulse 94   Resp 16   Ht 4' 11" (1 499 m)   Wt 87 5 kg (192 lb 12 8 oz)   SpO2 96%   BMI 38 94 kg/m²          Physical Exam  Vitals and nursing note reviewed  Constitutional:       General: She is not in acute distress  Appearance: She is well-developed  She is obese  She is not ill-appearing, toxic-appearing or diaphoretic  HENT:      Head: Normocephalic  Eyes:      General: No scleral icterus  Right eye: No discharge  Left eye: No discharge  Conjunctiva/sclera: Conjunctivae normal       Pupils: Pupils are equal, round, and reactive to light     Cardiovascular:      Rate and Rhythm: Normal rate and regular rhythm  Heart sounds: Normal heart sounds  No murmur heard  No friction rub  No gallop  Pulmonary:      Effort: No respiratory distress  Breath sounds: Normal breath sounds  No wheezing or rales  Abdominal:      General: Bowel sounds are normal  There is no distension  Palpations: Abdomen is soft  There is no mass  Tenderness: There is no abdominal tenderness  There is no guarding or rebound  Musculoskeletal:         General: No deformity  Cervical back: Neck supple  Lymphadenopathy:      Cervical: No cervical adenopathy  Neurological:      Mental Status: She is alert        Coordination: Coordination normal

## 2022-11-01 NOTE — PSYCH
Psychotherapy Provided: Individual Psychotherapy 40 minutes     Length of time in session: 40 minutes, follow up in two weeks    Encounter Diagnosis     ICD-10-CM    1  Anxiety  F41 9        Goals addressed in session: Goal 1     Pain:      none    0    Current suicide risk : Low     D- Onesimo Pilar has been struggling to manage stress and anxiety related to recent fall/physcial health issues  Has just moved for third time in the last several months and has had little assistance in this process from   Feels he has not been supportive emotionally and continues to expect her to manage all affairs despite her health struggles  Has felt anxious and overwhelmed  Sleep has also been affected  Sees PCP this afternoon to address  Balta Mcmanus presents as anxious and overwhelmed  She is verbal, cooperative, well oriented and engaged during session  Thoughts are logical and goal directed  P- processed recent stressors and their influence upon her mood- validation and supportive therapy provided  Reviewed coping strategies and productive outlets  for her to utilize to manage her anxiety issues  Reviewed boundaries and expectations to maintain within her marital relationship  Behavioral Health Treatment Plan [facility] beds at the [facility]: Diagnosis and Treatment Plan explained to Elliott Rios relates understanding diagnosis and is agreeable to Treatment Plan   Yes     Visit start and stop times:    11/01/22

## 2022-11-02 NOTE — ASSESSMENT & PLAN NOTE
I have counselled the pt to follow a healthy and balanced diet ,and recommend routine exercise    Low-cholesterol diet

## 2022-11-02 NOTE — ASSESSMENT & PLAN NOTE
Recent increase of the creatinine I would like the patient drink 4-6 glasses of water daily reduce sodium in avoid anti-inflammatories will check a BMP in 1 week

## 2022-11-02 NOTE — ASSESSMENT & PLAN NOTE
Lab Results   Component Value Date    EGFR 25 10/27/2022    EGFR 38 05/05/2022    EGFR 37 09/17/2021    CREATININE 1 92 (H) 10/27/2022    CREATININE 1 35 (H) 05/05/2022    CREATININE 1 40 (H) 12/13/2021   Drink adequate amount of water, avoid anti-inflammatories, reduce sodium in the diet and will continue to monitor the kidney function

## 2022-11-02 NOTE — ASSESSMENT & PLAN NOTE
With a component of adjustment disorder/generalized anxiety disorder no SI currently in the process of moving into her new home Clinically stable and doing well continue the current medical regiment will continue monitor    No SI provided Counseling

## 2022-11-15 DIAGNOSIS — F41.0 PANIC ATTACKS: ICD-10-CM

## 2022-11-15 DIAGNOSIS — F41.1 GAD (GENERALIZED ANXIETY DISORDER): ICD-10-CM

## 2022-11-15 DIAGNOSIS — F41.9 ANXIETY: ICD-10-CM

## 2022-11-15 NOTE — TELEPHONE ENCOUNTER
Medication Refill Request     Name Xanax  Dose/Frequency 0 5mg BID  Quantity 60  Verified pharmacy   [x]  Verified ordering Provider   [x]  Does patient have enough for the next 3 days?  Yes [] No [x]

## 2022-11-16 RX ORDER — ALPRAZOLAM 0.5 MG/1
0.5 TABLET ORAL 2 TIMES DAILY PRN
Qty: 60 TABLET | Refills: 0 | Status: SHIPPED | OUTPATIENT
Start: 2022-11-16

## 2022-12-09 ENCOUNTER — OFFICE VISIT (OUTPATIENT)
Dept: INTERNAL MEDICINE CLINIC | Facility: CLINIC | Age: 77
End: 2022-12-09

## 2022-12-09 VITALS
SYSTOLIC BLOOD PRESSURE: 128 MMHG | OXYGEN SATURATION: 96 % | HEART RATE: 97 BPM | WEIGHT: 195.8 LBS | RESPIRATION RATE: 16 BRPM | HEIGHT: 59 IN | BODY MASS INDEX: 39.47 KG/M2 | DIASTOLIC BLOOD PRESSURE: 72 MMHG

## 2022-12-09 DIAGNOSIS — Z79.4 TYPE 2 DIABETES MELLITUS WITH COMPLICATION, WITH LONG-TERM CURRENT USE OF INSULIN (HCC): ICD-10-CM

## 2022-12-09 DIAGNOSIS — Z12.31 ENCOUNTER FOR SCREENING MAMMOGRAM FOR BREAST CANCER: ICD-10-CM

## 2022-12-09 DIAGNOSIS — I82.4Y3 DEEP VEIN THROMBOSIS (DVT) OF PROXIMAL VEIN OF BOTH LOWER EXTREMITIES, UNSPECIFIED CHRONICITY (HCC): ICD-10-CM

## 2022-12-09 DIAGNOSIS — Z00.00 MEDICARE ANNUAL WELLNESS VISIT, SUBSEQUENT: Primary | ICD-10-CM

## 2022-12-09 DIAGNOSIS — Z13.820 SCREENING FOR OSTEOPOROSIS: ICD-10-CM

## 2022-12-09 DIAGNOSIS — L40.50 PSORIASIS WITH ARTHROPATHY (HCC): ICD-10-CM

## 2022-12-09 DIAGNOSIS — I10 ESSENTIAL HYPERTENSION: Chronic | ICD-10-CM

## 2022-12-09 DIAGNOSIS — R53.83 OTHER FATIGUE: ICD-10-CM

## 2022-12-09 DIAGNOSIS — N18.32 STAGE 3B CHRONIC KIDNEY DISEASE (HCC): ICD-10-CM

## 2022-12-09 DIAGNOSIS — E11.8 TYPE 2 DIABETES MELLITUS WITH COMPLICATION, WITH LONG-TERM CURRENT USE OF INSULIN (HCC): ICD-10-CM

## 2022-12-09 NOTE — PATIENT INSTRUCTIONS
Medicare Preventive Visit Patient Instructions  Thank you for completing your Welcome to Medicare Visit or Medicare Annual Wellness Visit today  Your next wellness visit will be due in one year (12/10/2023)  The screening/preventive services that you may require over the next 5-10 years are detailed below  Some tests may not apply to you based off risk factors and/or age  Screening tests ordered at today's visit but not completed yet may show as past due  Also, please note that scanned in results may not display below  Preventive Screenings:  Service Recommendations Previous Testing/Comments   Colorectal Cancer Screening  * Colonoscopy    * Fecal Occult Blood Test (FOBT)/Fecal Immunochemical Test (FIT)  * Fecal DNA/Cologuard Test  * Flexible Sigmoidoscopy Age: 39-70 years old   Colonoscopy: every 10 years (may be performed more frequently if at higher risk)  OR  FOBT/FIT: every 1 year  OR  Cologuard: every 3 years  OR  Sigmoidoscopy: every 5 years  Screening may be recommended earlier than age 39 if at higher risk for colorectal cancer  Also, an individualized decision between you and your healthcare provider will decide whether screening between the ages of 74-80 would be appropriate  Colonoscopy: 05/30/2017  FOBT/FIT: Not on file  Cologuard: Not on file  Sigmoidoscopy: Not on file          Breast Cancer Screening Age: 36 years old  Frequency: every 1-2 years  Not required if history of left and right mastectomy Mammogram: 05/10/2017        Cervical Cancer Screening Between the ages of 21-29, pap smear recommended once every 3 years  Between the ages of 33-67, can perform pap smear with HPV co-testing every 5 years     Recommendations may differ for women with a history of total hysterectomy, cervical cancer, or abnormal pap smears in past  Pap Smear: Not on file        Hepatitis C Screening Once for adults born between [facility]  More frequently in patients at high risk for Hepatitis C Hep C Antibody: 12/13/2021        Diabetes Screening 1-2 times per year if you're at risk for diabetes or have pre-diabetes Fasting glucose: 140 mg/dL (5/5/2022)  A1C: 8 6 % of total Hgb (9/2/2022)      Cholesterol Screening Once every 5 years if you don't have a lipid disorder  May order more often based on risk factors  Lipid panel: 12/13/2021          Other Preventive Screenings Covered by Medicare:  1  Abdominal Aortic Aneurysm (AAA) Screening: covered once if your at risk  You're considered to be at risk if you have a family history of AAA  2  Lung Cancer Screening: covers low dose CT scan once per year if you meet all of the following conditions: (1) Age 50-69; (2) No signs or symptoms of lung cancer; (3) Current smoker or have quit smoking within the last 15 years; (4) You have a tobacco smoking history of at least 20 pack years (packs per day multiplied by number of years you smoked); (5) You get a written order from a healthcare provider  3  Glaucoma Screening: covered annually if you're considered high risk: (1) You have diabetes OR (2) Family history of glaucoma OR (3)  aged 48 and older OR (3)  American aged 72 and older  3  Osteoporosis Screening: covered every 2 years if you meet one of the following conditions: (1) You're estrogen deficient and at risk for osteoporosis based off medical history and other findings; (2) Have a vertebral abnormality; (3) On glucocorticoid therapy for more than 3 months; (4) Have primary hyperparathyroidism; (5) On osteoporosis medications and need to assess response to drug therapy  · Last bone density test (DXA Scan): Not on file  5  HIV Screening: covered annually if you're between the age of 12-76  Also covered annually if you are younger than 13 and older than 72 with risk factors for HIV infection  For pregnant patients, it is covered up to 3 times per pregnancy      Immunizations:  Immunization Recommendations   Influenza Vaccine Annual influenza vaccination during flu season is recommended for all persons aged >= 6 months who do not have contraindications   Pneumococcal Vaccine   * Pneumococcal conjugate vaccine = PCV13 (Prevnar 13), PCV15 (Vaxneuvance), PCV20 (Prevnar 20)  * Pneumococcal polysaccharide vaccine = PPSV23 (Pneumovax) Adults 25-60 years old: 1-3 doses may be recommended based on certain risk factors  Adults 72 years old: 1-2 doses may be recommended based off what pneumonia vaccine you previously received   Hepatitis B Vaccine 3 dose series if at intermediate or high risk (ex: diabetes, end stage renal disease, liver disease)   Tetanus (Td) Vaccine - COST NOT COVERED BY MEDICARE PART B Following completion of primary series, a booster dose should be given every 10 years to maintain immunity against tetanus  Td may also be given as tetanus wound prophylaxis  Tdap Vaccine - COST NOT COVERED BY MEDICARE PART B Recommended at least once for all adults  For pregnant patients, recommended with each pregnancy  Shingles Vaccine (Shingrix) - COST NOT COVERED BY MEDICARE PART B  2 shot series recommended in those aged 48 and above     Health Maintenance Due:      Topic Date Due   • Breast Cancer Screening: Mammogram  05/10/2018   • Colorectal Cancer Screening  05/30/2022   • Hepatitis C Screening  Completed     Immunizations Due:      Topic Date Due   • Hepatitis B Vaccine (1 of 3 - 3-dose series) Never done   • COVID-19 Vaccine (4 - Booster for Moderna series) 04/21/2022     Advance Directives   What are advance directives? Advance directives are legal documents that state your wishes and plans for medical care  These plans are made ahead of time in case you lose your ability to make decisions for yourself  Advance directives can apply to any medical decision, such as the treatments you want, and if you want to donate organs  What are the types of advance directives?   There are many types of advance directives, and each state has rules about how to use them  You may choose a combination of any of the following:  · Living will: This is a written record of the treatment you want  You can also choose which treatments you do not want, which to limit, and which to stop at a certain time  This includes surgery, medicine, IV fluid, and tube feedings  · Durable power of  for healthcare [facility]): This is a written record that states who you want to make healthcare choices for you when you are unable to make them for yourself  This person, called a proxy, is usually a family member or a friend  You may choose more than 1 proxy  · Do not resuscitate (DNR) order:  A DNR order is used in case your heart stops beating or you stop breathing  It is a request not to have certain forms of treatment, such as CPR  A DNR order may be included in other types of advance directives  · Medical directive: This covers the care that you want if you are in a coma, near death, or unable to make decisions for yourself  You can list the treatments you want for each condition  Treatment may include pain medicine, surgery, blood transfusions, dialysis, IV or tube feedings, and a ventilator (breathing machine)  · Values history: This document has questions about your views, beliefs, and how you feel and think about life  This information can help others choose the care that you would choose  Why are advance directives important? An advance directive helps you control your care  Although spoken wishes may be used, it is better to have your wishes written down  Spoken wishes can be misunderstood, or not followed  Treatments may be given even if you do not want them  An advance directive may make it easier for your family to make difficult choices about your care     Weight Management   Why it is important to manage your weight:  Being overweight increases your risk of health conditions such as heart disease, high blood pressure, type 2 diabetes, and certain types of cancer  It can also increase your risk for osteoarthritis, sleep apnea, and other respiratory problems  Aim for a slow, steady weight loss  Even a small amount of weight loss can lower your risk of health problems  How to lose weight safely:  A safe and healthy way to lose weight is to eat fewer calories and get regular exercise  You can lose up about 1 pound a week by decreasing the number of calories you eat by 500 calories each day  Healthy meal plan for weight management:  A healthy meal plan includes a variety of foods, contains fewer calories, and helps you stay healthy  A healthy meal plan includes the following:  · Eat whole-grain foods more often  A healthy meal plan should contain fiber  Fiber is the part of grains, fruits, and vegetables that is not broken down by your body  Whole-grain foods are healthy and provide extra fiber in your diet  Some examples of whole-grain foods are whole-wheat breads and pastas, oatmeal, brown rice, and bulgur  · Eat a variety of vegetables every day  Include dark, leafy greens such as spinach, kale, yoel greens, and mustard greens  Eat yellow and orange vegetables such as carrots, sweet potatoes, and winter squash  · Eat a variety of fruits every day  Choose fresh or canned fruit (canned in its own juice or light syrup) instead of juice  Fruit juice has very little or no fiber  · Eat low-fat dairy foods  Drink fat-free (skim) milk or 1% milk  Eat fat-free yogurt and low-fat cottage cheese  Try low-fat cheeses such as mozzarella and other reduced-fat cheeses  · Choose meat and other protein foods that are low in fat  Choose beans or other legumes such as split peas or lentils  Choose fish, skinless poultry (chicken or turkey), or lean cuts of red meat (beef or pork)  Before you cook meat or poultry, cut off any visible fat  · Use less fat and oil  Try baking foods instead of frying them   Add less fat, such as margarine, sour cream, regular salad dressing and mayonnaise to foods  Eat fewer high-fat foods  Some examples of high-fat foods include french fries, doughnuts, ice cream, and cakes  · Eat fewer sweets  Limit foods and drinks that are high in sugar  This includes candy, cookies, regular soda, and sweetened drinks  Exercise:  Exercise at least 30 minutes per day on most days of the week  Some examples of exercise include walking, biking, dancing, and swimming  You can also fit in more physical activity by taking the stairs instead of the elevator or parking farther away from stores  Ask your healthcare provider about the best exercise plan for you  © Copyright [employer] 2018 Information is for End User's use only and may not be sold, redistributed or otherwise used for commercial purposes  All illustrations and images included in CareNotes® are the copyrighted property of A D A M , Inc  or [facility] [place]     Type 2 Diabetes Management for Adults   AMBULATORY CARE:   Type 2 diabetes  is a disease that affects how your body uses glucose (sugar)  Either your body cannot make enough insulin, or it cannot use the insulin correctly  It is important to keep diabetes controlled to prevent damage to your heart, blood vessels, and other organs  Have someone call your local emergency number (911 in the [address] [facility]) if:   · You cannot be woken  · You have signs of diabetic ketoacidosis:     ? confusion, fatigue    ? vomiting    ? rapid heartbeat    ? fruity smelling breath    ? extreme thirst    ? dry mouth and skin    · You have any of the following signs of a heart attack:      ? Squeezing, pressure, or pain in your chest    ? You may  also have any of the following:     § Discomfort or pain in your back, neck, jaw, stomach, or arm    § Shortness of breath    § Nausea or vomiting    § Lightheadedness or a sudden cold sweat    · You have any of the following signs of a stroke:      ? Numbness or drooping on one side of your face     ?  Weakness in an arm or leg    ? Confusion or difficulty speaking    ? Dizziness, a severe headache, or vision loss    Call your doctor or diabetes care team if:   · You have a sore or wound that will not heal     · You have a change in the amount you urinate  · Your blood sugar levels are higher than your target goals  · You often have lower blood sugar levels than your target goals  · Your skin is red, dry, warm, or swollen  · You have trouble coping with diabetes, or you feel anxious or depressed  · You have questions or concerns about your condition or care  What you need to know about high blood sugar levels:  High blood sugar levels may not cause any symptoms  You may feel more thirsty or urinate more often than usual  Over time, high blood sugar levels can damage your nerves, blood vessels, tissues, and organs  The following can increase your blood sugar levels:  · Large meals or large amounts of carbohydrates at one time    · Less physical activity    · Stress    · Illness    · A lower dose of medicine or insulin, or a late dose    What you need to know about low blood sugar levels: You can prevent symptoms such as shakiness, dizziness, irritability, or confusion by preventing your blood sugar levels from going too low  · Treat a low blood sugar level right away  ? Drink 4 ounces of juice or have 1 tube of glucose gel  ? Check your blood sugar level again 10 to 15 minutes later  ? When the level goes back to normal, eat a meal or snack to prevent another decrease  · Keep glucose gel, raisins, or hard candy with you at all times to treat a low blood sugar level  · Your blood sugar level can get too low if you take diabetes medicine or insulin and do not eat enough food  · If you use insulin, check your blood sugar level before you exercise  ? If your blood sugar level is below 100 mg/dL, eat 4 crackers or 2 ounces of raisins, or drink 4 ounces of juice  ?  Check your level every 30 minutes if you exercise more than 1 hour  ? You may need a snack during or after exercise  What you can do to manage your blood sugar levels:   · Check your blood sugar levels as directed and as needed  Several items are available to use to check your levels  You may need to check by testing a drop of blood in a glucose monitor  You may instead be given a continuous glucose monitoring (CGM) device  The device is worn at all times  The CGM checks your blood sugar level every 5 minutes  It sends results to an electronic device such as a smart phone  A CGM can be used with or without an insulin pump  Talk with your provider to find out which method is best for you  The goal for blood sugar levels before meals  is between 80 and 130 mg/dL and 2 hours after eating  is lower than 180 mg/dL  · Make healthy food choices  Work with a dietitian to develop a meal plan that works for you and your schedule  A dietitian can help you learn how to eat the right amount of carbohydrates during your meals and snacks  Carbohydrates can raise your blood sugar level if you eat too many at one time  Examples of foods that contain carbohydrates are breads, cereals, rice, pasta, and sweets  · Get regular physical activity  Physical activity can help you get to your target blood sugar level goal and manage your weight  Get at least 150 minutes of moderate to vigorous aerobic physical activity each week  Do not miss more than 2 days in a row  Do not sit longer than 30 minutes at a time  Your healthcare provider can help you create an activity plan  The plan can include the best activities for you and can help you build your strength and endurance  · Maintain a healthy weight  Ask your healthcare provider what a healthy weight is for you  Ask him or her to help you create a safe weight loss plan if you are overweight  · Take your diabetes medicine or insulin as directed    You may need diabetes medicine, insulin, or both to help control your blood sugar levels  Your healthcare provider will teach you how and when to take your diabetes medicine or insulin  You will also be taught about side effects oral diabetes medicine can cause  Insulin may be injected, or given through a pump or pen  You and your care team will discuss which method is best for you  ? An insulin pump  is an implanted device that gives your insulin 24 hours a day  An insulin pump prevents the need for multiple insulin injections in a day  ? An insulin pen  is a device prefilled with the right amount of insulin  ? You and your family members will be taught how to draw up and give insulin  if this is the best method for you  Your education team will also teach you how to dispose of needles and syringes  ? You will learn how much insulin you need  and when to give it  You will be taught when not to give insulin  You will also be taught what to do if your blood sugar level drops too low  This may happen if you take insulin and do not eat the right amount of carbohydrates  Other things you can do to manage type 2 diabetes:   · Wear medical alert identification  Wear medical alert jewelry or carry a card that says you have diabetes  Ask your provider where to get these items  · Do not smoke  Nicotine and other chemicals in cigarettes and cigars can cause lung and blood vessel damage  It also makes it more difficult to manage your diabetes  Ask your provider for information if you currently smoke and need help to quit  Do not use e-cigarettes or smokeless tobacco in place of cigarettes or to help you quit  They still contain nicotine  · Check your feet each day for cuts, scratches, calluses, or other wounds  Look for redness and swelling, and feel for warmth  Wear shoes that fit well  Check your shoes for rocks or other objects that can hurt your feet  Do not walk barefoot or wear shoes without socks   Wear cotton socks to help keep your feet dry  · Ask about vaccines you may need  You have a higher risk for serious illness if you get the flu, pneumonia, COVID-19, or hepatitis  Ask your provider if you should get vaccines to prevent these or other diseases, and when to get the vaccines  · Talk to your care team if you become stressed about diabetes care  Sometimes being able to fit diabetes care into your life can cause increased stress  The stress can cause you not to take care of yourself properly  Your care team can help by offering tips about self-care  Your care team may suggest you talk to a mental health provider  The provider can listen and offer help with self-care issues  Follow up with your doctor or diabetes care team as directed: You may need to have blood tests done before your follow-up visit  The test results will show if changes need to be made in your treatment or self-care  Write down your questions so you remember to ask them during your visits  Talk to your provider if you cannot afford your medicine  © Copyright [employer] 2022 Information is for End User's use only and may not be sold, redistributed or otherwise used for commercial purposes  All illustrations and images included in CareNotes® are the copyrighted property of [employer] M [employer]  or Savanna Latham   The above information is an  only  It is not intended as medical advice for individual conditions or treatments  Talk to your doctor, nurse or pharmacist before following any medical regimen to see if it is safe and effective for you

## 2022-12-09 NOTE — PROGRESS NOTES
Assessment and Plan:     Problem List Items Addressed This Visit        Endocrine    Type 2 diabetes mellitus with complication, with long-term current use of insulin ([facility] ) (Chronic)       Lab Results   Component Value Date    HGBA1C 8 6 (H) 09/02/2022   I have counselled the pt to follow a healthy and balanced diet ,and recommend routine exercise  I will be ordering diabetic laboratories including comprehensive metabolic panel, hemoglobin A1c, urine microalbumin, lipid panel  Treated A1c under 7 patient reports me that she is currently dissatisfied with her endocrinologist and would like to be reassigned to a different endocrinologist at [facility], I will have her see Dr Lindo         Relevant Orders    Hemoglobin A1C    Comprehensive metabolic panel    Microalbumin / creatinine urine ratio    Lipid Panel with Direct LDL reflex    Ambulatory Referral to Endocrinology       Cardiovascular and Mediastinum    Essential hypertension (Chronic)     Hypertension - controlled, I have counseled patient following healthy balance diet, I would like the patient reduce sodium, exercise routinely, I would like the patient continued the med current medical regiment and we will continue to monitor  DVT (deep venous thrombosis) (HCC)     Resolved currently on prophylactic aspirin 81 mg once daily            Musculoskeletal and Integument    Psoriasis with arthropathy ([facility] )     Clinically stable and doing well continue the current medical regiment will continue monitor  Genitourinary    Stage 3b chronic kidney disease ([facility] )     Drink adequate amount of water, avoid anti-inflammatories, reduce sodium in the diet and will continue to monitor the kidney function            Other    Encounter for screening mammogram for breast cancer    Relevant Orders    Mammo screening bilateral w 3d & cad    Medicare annual wellness visit, subsequent - Primary     Assessment and plan 1    Medicare subsequent annual wellness examination overall the patient is clinically stable and doing well, we encouraged the patient to follow a healthy and balanced diet  We recommend that the patient exercise routinely approximately 30 minutes 5 times per week   We have reviewed the patient's vaccines and have made recommendations for updates if necessary        We will be ordering screening laboratories which are age appropriate  Return to the office in 4 months    call if any problems  Screening for osteoporosis    Relevant Orders    DXA bone density spine hip and pelvis   Other Visit Diagnoses     Other fatigue        Relevant Orders    Home Study           Preventive health issues were discussed with patient, and age appropriate screening tests were ordered as noted in patient's After Visit Summary  Personalized health advice and appropriate referrals for health education or preventive services given if needed, as noted in patient's After Visit Summary  History of Present Illness:     Patient presents for a Medicare Wellness Visit    HPI 74-year old female coming in for a follow up office visit regarding type 2 diabetes, psoriasis, DVT, fatigue, hypertension, stage IIIb chronic kidney disease; the patient reports me compliant taking medications without untoward side effects the  The patient is here to review his medical condition, update me on the medical condition and the patient reports me no hospitalizations and no ER visits    No injuries no illnesses she would like a new endocrinologist   She reports may sleeping a lot more 20 hours , physical fatigue, she reports me this is not depression this is not mental fatigue  dosent do and thing and then complains that she wants a new sensor, eventually found a new sensor , wants new endo  Patient Care Team:  Lashonda Grimm, DO as PCP - General  Dory Leventhal, MD Caldwell Files, [address] , MD Gloria Car MD Lyndol Lion, MD Doneen Georges MD Julio Alex MD Edwena Pleasant, MD     Review of Systems:     Review of Systems   Constitutional: Negative for activity change, appetite change and unexpected weight change  HENT: Negative for congestion and postnasal drip  Eyes: Negative for visual disturbance  Respiratory: Negative for cough and shortness of breath  Cardiovascular: Negative for chest pain  Gastrointestinal: Negative for abdominal pain, diarrhea, nausea and vomiting  Neurological: Negative for dizziness, light-headedness and headaches  Hematological: Negative for adenopathy          Problem List:     Patient Active Problem List   Diagnosis   • Panic attacks   • Type 2 diabetes mellitus with complication, with long-term current use of insulin ([facility])   • MDD (major depressive disorder), recurrent severe, without psychosis ([facility] )   • Chest pain on breathing   • History of DVT (deep vein thrombosis)   • PVC's (premature ventricular contractions)   • CAD (coronary artery disease)   • Essential hypertension   • Hyperlipidemia   • Fibromyalgia   • Spinal stenosis of lumbar region   • Adenomatous polyp of colon   • Vitreo-retinal adhesions   • Vitamin D deficiency   • Vitamin B12 deficiency   • Ventral hernia   • Vascular claudication (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)   • SARAY (obstructive sleep apnea)   • Allergic rhinitis   • Pulmonary nodules   • Lumbar spondylosis   • Chronic pain syndrome   • Lumbar facet arthropathy   • Post-nasal drip   • Vision changes   • Closed fracture of nasal bones   • Restrictive lung disease secondary to obesity   • MADAN (generalized anxiety disorder)   • Insomnia   • Chronic renal impairment, stage 4 (severe) ([facility])   • Other proteinuria   • Lung nodule   • Abdominal bloating   • Neck pain   • Herpes zoster without complication   • Post herpetic neuralgia   • SOB (shortness of breath)   • Apraxia   • Skin lesion   • Microalbuminuria   • Stage 3b chronic kidney disease ([facility])   • Hypothyroidism   • Diplopia   • Lumbar radiculopathy   • Contracture of hand   • Colon polyps   • DVT (deep venous thrombosis) ([facility])   • Presence of stent in coronary artery   • Psoriasis with arthropathy ([facility])   • S/P CABG (coronary artery bypass graft)   • Complex endometrial hyperplasia   • Mixed hyperlipidemia   • Lung nodule   • Other constipation   • Osteoarthritis of spine with radiculopathy, lumbar region   • Protrusion of intervertebral disc of thoracic region   • Encounter for screening mammogram for breast cancer   • Anxiety   • Elevated serum creatinine   • Medicare annual wellness visit, subsequent   • Screening for osteoporosis      Past Medical and Surgical History:     Past Medical History:   Diagnosis Date   • Acute embolism and thrombosis of unspecified deep veins of unspecified lower extremity ([facility] )     Last Assessed:  5/18/17   • Anemia    • Anosmia    • Anxiety    • Arthritis    • Asthma    • Back pain    • Bilateral macular retinal edema    • CAD (coronary artery disease)    • Cataract    • Cervical disc herniation    • Cervical radiculopathy    • Cervical spinal stenosis    • Cervical spondylolysis    • Chronic kidney disease    • Chronic mastoiditis    • Colon polyp    • Complex endometrial hyperplasia    • Depression    • Diabetes mellitus ([facility] )    • Disease of thyroid gland    • DVT (deep venous thrombosis) ([facility])    • Fibromyalgia    • Fibromyalgia, primary    • Hyperlipidemia    • Hypertension    • Hypothyroid    • Kidney stone    • Lumbar radiculopathy    • Neck pain    • Obese    • PONV (postoperative nausea and vomiting)    • Shingles 07/01/2021   • Spinal stenosis    • Stomach ulcer    • Thyroid disease      Past Surgical History:   Procedure Laterality Date   • BACK SURGERY     • CARPAL TUNNEL RELEASE     • CATARACT EXTRACTION     • CHOLECYSTECTOMY     • COLONOSCOPY     • CORONARY ANGIOPLASTY     • CORONARY ARTERY BYPASS GRAFT     • CYSTOSCOPY N/A 6/20/2017    Procedure: CYSTOSCOPY;  Surgeon: Mary Ann Alejo MD;  Location: [facility];  Service: Gynecology Oncology   • CYSTOSCOPY     • ME LAPS TOTAL HYSTERECT 250 GM/< W/RMVL TUBE/OVARY N/A 6/20/2017    Procedure: ROBOTIC HYSTERECTOMY; BILATERAL SALPINO-OOPHERECTOMY; umbilical hernia repair ;  Surgeon: Mary Ann Alejo MD;  Location[facility];  Service: Gynecology Oncology   • ME REPAIR FIRST ABDOMINAL WALL HERNIA N/A 5/12/2022    Procedure: REPAIR HERNIA INCISIONAL;  Surgeon: Clovis Herrera DO;  Location: [facility];  Service: General   • TONSILECTOMY AND ADNOIDECTOMY     • TONSILLECTOMY     • UMBILICAL HERNIA REPAIR        Family History:     Family History   Problem Relation Age of Onset   • Arthritis Mother    • Leukemia Mother    • Other Mother         Anxiety, major depressive disorder, recurrent episode with atypical features   • Coronary artery disease Father         Heart problem   • Diabetes Father    • Other Father         Infectious disease   • Alzheimer's disease Maternal Grandmother    • Other Maternal Grandfather         Heart problem   • Other Daughter         Anxiety, major depressive disorder, recurrent episode with atypical features   • Alcohol abuse Other         Grandparent   • Cancer Family    • Diabetes Family    • Hypertension Family    • Other Family         Reported prior back trouble, thyroid disorder      Social History:     Social History     Socioeconomic History   • Marital status: /Civil Union     Spouse name: None   • Number of children: 2   • Years of education: High school or GED   • Highest education level: None Occupational History   • Occupation: Retired   Tobacco Use   • Smoking status: Former     Packs/day: 1 00     Years: 6 00     Pack years: 6 00     Types: Cigarettes     Quit date:      Years since quittin 9   • Smokeless tobacco: Never   • Tobacco comments:     Smoked about a half a pack for about 4 yrs  Quite about 50 yrs ago  Vaping Use   • Vaping Use: Never used   Substance and Sexual Activity   • Alcohol use: No   • Drug use: No   • Sexual activity: Not Currently     Comment: No known STD risk factors   Other Topics Concern   • None   Social History Narrative    Lives independently with spouse    No known risk factors    Denied:  Exercising regularly     Social Determinants of Health     Financial Resource Strain: Low Risk    • Difficulty of Paying Living Expenses: Not hard at all   Food Insecurity: Not on file   Transportation Needs: No Transportation Needs   • Lack of Transportation (Medical): No   • Lack of Transportation (Non-Medical):  No   Physical Activity: Not on file   Stress: Not on file   Social Connections: Not on file   Intimate Partner Violence: Not on file   Housing Stability: Not on file      Medications and Allergies:     Current Outpatient Medications   Medication Sig Dispense Refill   • ALPRAZolam (XANAX) 0 5 mg tablet Take 1 tablet (0 5 mg total) by mouth 2 (two) times a day as needed for anxiety 60 tablet 0   • aspirin 81 MG tablet Take 81 mg by mouth daily     • B-D UF III MINI PEN NEEDLES 31G X 5 MM MISC      • BAQSIMI TWO PACK 3 MG/DOSE POWD Use as directed   0   • BD Insulin Syringe U/F 31G X 5/16" 0 5 ML MISC 4 (four) times a day As directed     • cholecalciferol (VITAMIN D3) 1,000 units tablet Take 2,000 Units by mouth daily     • Coenzyme Q10 (Co Q-10) 200 MG CAPS Take by mouth in the morning     • diclofenac sodium (VOLTAREN) 1 % Apply 2 g topically 4 (four) times a day (Patient taking differently: Apply 2 g topically as needed) 1 Tube 0   • diltiazem (TIAZAC) 300 MG 24 hr capsule Take 300 mg by mouth daily     • docusate sodium (COLACE) 100 mg capsule Take 1 capsule (100 mg total) by mouth 2 (two) times a day (Patient taking differently: Take 100 mg by mouth 2 (two) times a day as needed) 20 capsule 0   • DULoxetine (CYMBALTA) 60 mg delayed release capsule Take 1 capsule (60 mg total) by mouth daily 90 capsule 3   • insulin aspart (NovoLOG) 100 units/mL injection Inject 12 Units under the skin 3 (three) times a day before meals (Patient taking differently: Inject under the skin INSULIN PUMP)  0   • Insulin Disposable Pump (OmniPod Dash 5 Pack Pods) MISC USE 1 POD EVERY 1 5 DAYS     • Insulin Disposable Pump (OmniPod Dash 5 Pack Pods) MISC Omnipod Dash Insulin Pod subcutaneous cartridge   USE 1 POD EVERY 1 5 DAYS     • irbesartan (AVAPRO) 300 mg tablet Take 300 mg by mouth     • isosorbide mononitrate (IMDUR) 60 mg 24 hr tablet TAKE 1 5 TABs AT NIGHT     • Lantus 100 UNIT/ML subcutaneous injection      • levothyroxine 50 mcg tablet Take 50 mcg by mouth daily     • naloxone (NARCAN) 4 mg/0 1 mL nasal spray Administer 1 spray into a nostril  If no response after 2-3 minutes, give another dose in the other nostril using a new spray  1 each 1   • nitroglycerin (NITROLINGUAL) 0 4 mg/spray spray USE ONE SPRAY Q 5 MINUTES AS NEEDED FOR CHEST PAIN  IF NO RELIEF, CALL 911   1   • NovoLOG 100 UNIT/ML SOLN INFUSE UNDER THE SKIN VIA INSULIN PUMP AS DIRECTED  TOTAL DAILY DOSE  UNITS     • rosuvastatin (CRESTOR) 20 MG tablet Take 20 mg by mouth every evening    2   • traZODone (DESYREL) 50 mg tablet TAKE 0 5-1 TABLETS (25-50 MG TOTAL) BY MOUTH DAILY AT BEDTIME AS NEEDED FOR SLEEP 90 tablet 1   • enalapril (VASOTEC) 20 mg tablet Take 20 mg by mouth daily at bedtime   (Patient not taking: Reported on 12/9/2022)       No current facility-administered medications for this visit       Allergies   Allergen Reactions   • Molds & Smuts Allergic Rhinitis   • Other Allergic Rhinitis     RAGWEED, CAT DANDER, DOG DANDER    • Pollen Extract Other (See Comments)     Cold symptoms        Immunizations:     Immunization History   Administered Date(s) Administered   • COVID-19 MODERNA VACC 0 5 ML IM 02/03/2021, 03/03/2021, 12/21/2021   • INFLUENZA 11/04/2008, 11/01/2011   • Influenza Split High Dose Preservative Free IM 10/07/2015, 12/01/2016, 10/02/2017   • Influenza, high dose seasonal 0 7 mL 09/07/2018, 10/12/2019, 09/17/2020, 11/18/2021, 10/01/2022   • Influenza, seasonal, injectable 10/20/2012, 10/05/2013, 09/16/2014   • Pneumococcal Conjugate 13-Valent 12/17/2014   • Pneumococcal Polysaccharide PPV23 01/18/2022   • Tdap 08/24/2020      Health Maintenance:         Topic Date Due   • Breast Cancer Screening: Mammogram  05/10/2018   • Colorectal Cancer Screening  05/30/2022   • Hepatitis C Screening  Completed         Topic Date Due   • Hepatitis B Vaccine (1 of 3 - 3-dose series) Never done   • COVID-19 Vaccine (4 - Booster for Mendez Cano series) 04/21/2022      Medicare Screening Tests and Risk Assessments:     Cele Lott is here for her Subsequent Wellness visit  Health Risk Assessment:   Patient rates overall health as good  Patient feels that their physical health rating is same  Patient is satisfied with their life  Eyesight was rated as same  Hearing was rated as same  Patient feels that their emotional and mental health rating is same  Patients states they are never, rarely angry  Patient states they are never, rarely unusually tired/fatigued  Pain experienced in the last 7 days has been none  Patient states that she has experienced no weight loss or gain in last 6 months  Depression Screening:   PHQ-9 Score: 0      Fall Risk Screening: In the past year, patient has experienced: no history of falling in past year      Urinary Incontinence Screening:   Patient has not leaked urine accidently in the last six months       Home Safety:  Patient does not have trouble with stairs inside or outside of their home  Patient has working smoke alarms and has working carbon monoxide detector  Home safety hazards include: household clutter  Nutrition:   Current diet is Regular  Medications:   Patient is not currently taking any over-the-counter supplements  Patient is able to manage medications  Activities of Daily Living (ADLs)/Instrumental Activities of Daily Living (IADLs):   Walk and transfer into and out of bed and chair?: Yes  Dress and groom yourself?: Yes    Bathe or shower yourself?: Yes    Feed yourself? Yes  Do your laundry/housekeeping?: Yes  Manage your money, pay your bills and track your expenses?: Yes  Make your own meals?: Yes    Do your own shopping?: Yes    Previous Hospitalizations:   Any hospitalizations or ED visits within the last 12 months?: No      Advance Care Planning:   Living will: Yes    Durable POA for healthcare: Yes    Advanced directive: Yes      Cognitive Screening:   Provider or family/friend/caregiver concerned regarding cognition?: No    PREVENTIVE SCREENINGS      Cardiovascular Screening:    General: Screening Not Indicated and History Lipid Disorder      Diabetes Screening:     General: Screening Not Indicated and History Diabetes      Cervical Cancer Screening:    General: Screening Not Indicated      Lung Cancer Screening:     General: Screening Not Indicated      Hepatitis C Screening:    General: Screening Current    Screening, Brief Intervention, and Referral to Treatment (SBIRT)    Screening  Typical number of drinks in a day: 0  Typical number of drinks in a week: 0  Interpretation: Low risk drinking behavior  Single Item Drug Screening:  How often have you used an illegal drug (including marijuana) or a prescription medication for non-medical reasons in the past year? never    Single Item Drug Screen Score: 0  Interpretation: Negative screen for possible drug use disorder    No results found       Physical Exam:     /72   Pulse 97   Resp 16   Ht 4' 11" (1 499 m)   Wt 88 8 kg (195 lb 12 8 oz)   SpO2 96%   BMI 39 55 kg/m²     Physical Exam  Vitals and nursing note reviewed  Constitutional:       General: She is not in acute distress  Appearance: She is well-developed  She is obese  She is not ill-appearing, toxic-appearing or diaphoretic  HENT:      Head: Normocephalic and atraumatic  Right Ear: External ear normal       Left Ear: External ear normal       Nose: Nose normal    Eyes:      Pupils: Pupils are equal, round, and reactive to light  Cardiovascular:      Rate and Rhythm: Normal rate and regular rhythm  Heart sounds: Normal heart sounds  No murmur heard  Pulmonary:      Effort: Pulmonary effort is normal       Breath sounds: Normal breath sounds  Abdominal:      General: There is no distension  Palpations: Abdomen is soft  Tenderness: There is no abdominal tenderness  There is no guarding  Psychiatric:         Mood and Affect: Mood is not anxious or depressed  Thought Content: Thought content does not include suicidal ideation            Mae Simmons, DO

## 2022-12-11 PROBLEM — Z13.820 SCREENING FOR OSTEOPOROSIS: Status: ACTIVE | Noted: 2022-12-11

## 2022-12-11 PROBLEM — Z00.00 MEDICARE ANNUAL WELLNESS VISIT, SUBSEQUENT: Status: ACTIVE | Noted: 2022-12-11

## 2022-12-11 NOTE — ASSESSMENT & PLAN NOTE
Lab Results   Component Value Date    HGBA1C 8 6 (H) 09/02/2022   I have counselled the pt to follow a healthy and balanced diet ,and recommend routine exercise  I will be ordering diabetic laboratories including comprehensive metabolic panel, hemoglobin A1c, urine microalbumin, lipid panel    Treated A1c under 7 patient reports me that she is currently dissatisfied with her endocrinologist and would like to be reassigned to a different endocrinologist at [facility], I will have her see Dr Lindo

## 2022-12-11 NOTE — ASSESSMENT & PLAN NOTE
Assessment and plan 1  Medicare subsequent annual wellness examination overall the patient is clinically stable and doing well, we encouraged the patient to follow a healthy and balanced diet  We recommend that the patient exercise routinely approximately 30 minutes 5 times per week   We have reviewed the patient's vaccines and have made recommendations for updates if necessary        We will be ordering screening laboratories which are age appropriate  Return to the office in 4 months    call if any problems

## 2022-12-30 ENCOUNTER — TELEPHONE (OUTPATIENT)
Dept: OTHER | Facility: OTHER | Age: 77
End: 2022-12-30

## 2022-12-30 ENCOUNTER — NURSE TRIAGE (OUTPATIENT)
Dept: OTHER | Facility: OTHER | Age: 77
End: 2022-12-30

## 2022-12-30 DIAGNOSIS — F41.1 GAD (GENERALIZED ANXIETY DISORDER): ICD-10-CM

## 2022-12-30 DIAGNOSIS — F41.9 ANXIETY: ICD-10-CM

## 2022-12-30 DIAGNOSIS — F41.0 PANIC ATTACKS: ICD-10-CM

## 2022-12-30 RX ORDER — ALPRAZOLAM 0.5 MG/1
0.5 TABLET ORAL 2 TIMES DAILY PRN
Qty: 60 TABLET | Refills: 0 | Status: SHIPPED | OUTPATIENT
Start: 2022-12-30

## 2022-12-30 NOTE — TELEPHONE ENCOUNTER
Reason for Disposition  • [1] Follow-up call to recent contact AND [2] information only call, no triage required    Answer Assessment - Initial Assessment Questions  1   REASON FOR CALL or QUESTION: "What is your reason for calling today?" or "How can I best help you?" or "What question do you have that I can help answer?"      Medication request already sent to PCP    Protocols used: INFORMATION ONLY CALL - NO TRIAGE-ADULT-

## 2022-12-30 NOTE — TELEPHONE ENCOUNTER
Medication Refill Request     Name ALPRAZolam [place] Frank) 0 5 mg tablet  Dose/Frequency Take 1 tablet (0 5 mg total) by mouth 2 (two) times a day as needed for anxiety  Quantity 60 tablet  Verified pharmacy   [x]  Verified ordering Provider   [x]  Does patient have enough for the next 3 days?  Yes [] No [x]

## 2022-12-30 NOTE — TELEPHONE ENCOUNTER
Regarding: medication issue  ----- Message from Renetta Morelos sent at 12/30/2022  4:06 PM EST -----  "I am not feeling well due to me not having my pills  I haven't taken since 12/24/2022  I did call the office and I need this medication I am calling about my  (XANAX) 0 5 mg tablet

## 2023-01-01 NOTE — ASSESSMENT & PLAN NOTE
Obesity -I have counseled patient following healthy and balanced diet, I would like the patient to lose weight, I would like the patient exercise routinely; we will continue monitor the patient's progress  [place] infant of 39 completed weeks of gestation

## 2023-01-16 ENCOUNTER — NURSE TRIAGE (OUTPATIENT)
Dept: OTHER | Facility: OTHER | Age: 78
End: 2023-01-16

## 2023-01-16 NOTE — TELEPHONE ENCOUNTER
Patient fell on Friday was treated and ruled out any serious injury however she did sustain a 2 1/2 inch skin tear on left forearm that a dry dressing was applied to  Patient has a history of diabetes and states that she did not know what to do so she has left the same bandage in place and does not know what the would looks like at this time  However she did state that on Friday that when the dressing was being placed that the edges of her skin tear were unable to be approximated and she would like someone to look at her injury  Unsure if Dr Jose Zamudio wants to see her for this  Can someone call patient back in regards to this

## 2023-01-16 NOTE — TELEPHONE ENCOUNTER
Regarding: Skin tear   ----- Message from Rhoda Hernandez RN sent at 1/16/2023  1:00 PM EST -----  " I fell last week and landed on my wash basket  I went to [facility] and they put a bandage on my arm where the skin tore  No one told me what to do    What does Dr Doni Vargas want me to do"

## 2023-01-16 NOTE — TELEPHONE ENCOUNTER
Reason for Disposition  • High-risk adult (e g , age > 61 years, osteoporosis, chronic steroid use)    Additional Information  • Patient has a wound (abrasion, cut, puncture, other skin injury or tear)    Answer Assessment - Initial Assessment Questions  1  MECHANISM: "How did the fall happen?"      Tripped on handle of purse   2  DOMESTIC VIOLENCE AND ELDER ABUSE SCREENING: "Did you fall because someone pushed you or tried to hurt you?" If Yes, ask: "Are you safe now?"      No   3  ONSET: "When did the fall happen?" (e g , minutes, hours, or days ago)      "Last week"  4  LOCATION: "What part of the body hit the ground?" (e g , back, buttocks, head, hips, knees, hands, head, stomach)      Arm   5  INJURY: "Did you hurt (injure) yourself when you fell?" If Yes, ask: "What did you injure? Tell me more about this?" (e g , body area; type of injury; pain severity)"      Yes skin tear on left forearm  6  PAIN: "Is there any pain?" If Yes, ask: "How bad is the pain?" (e g , Scale 1-10; or mild,   moderate, severe)    - NONE (0): no pain    - MILD (1-3): doesn't interfere with normal activities     - MODERATE (4-7): interferes with normal activities or awakens from sleep     - SEVERE (8-10): excruciating pain, unable to do any normal activities       No   7  SIZE: For cuts, bruises, or swelling, ask: "How large is it?" (e g , inches or centimeters)       "it's about 2 1/2 inches"  8  PREGNANCY: "Is there any chance you are pregnant?" "When was your last menstrual period?"      no  9  OTHER SYMPTOMS: "Do you have any other symptoms?" (e g , dizziness, fever, weakness; new onset or worsening)  no  10   CAUSE: "What do you think caused the fall (or falling)?" (e g , tripped, dizzy spell)        Tripped on handle of purse    Protocols used: ARM INJURY-ADULT-OH, FALLS AND FALLING-ADULT-OH

## 2023-01-17 ENCOUNTER — OFFICE VISIT (OUTPATIENT)
Dept: INTERNAL MEDICINE CLINIC | Facility: CLINIC | Age: 78
End: 2023-01-17

## 2023-01-17 VITALS
DIASTOLIC BLOOD PRESSURE: 74 MMHG | OXYGEN SATURATION: 98 % | HEIGHT: 59 IN | RESPIRATION RATE: 16 BRPM | BODY MASS INDEX: 40.04 KG/M2 | HEART RATE: 71 BPM | SYSTOLIC BLOOD PRESSURE: 119 MMHG | WEIGHT: 198.6 LBS

## 2023-01-17 DIAGNOSIS — E11.8 TYPE 2 DIABETES MELLITUS WITH COMPLICATION, WITH LONG-TERM CURRENT USE OF INSULIN (HCC): ICD-10-CM

## 2023-01-17 DIAGNOSIS — F33.2 MDD (MAJOR DEPRESSIVE DISORDER), RECURRENT SEVERE, WITHOUT PSYCHOSIS (HCC): ICD-10-CM

## 2023-01-17 DIAGNOSIS — Z79.4 TYPE 2 DIABETES MELLITUS WITH COMPLICATION, WITH LONG-TERM CURRENT USE OF INSULIN (HCC): ICD-10-CM
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
## 2023-01-17 DIAGNOSIS — N18.4 CHRONIC KIDNEY DISEASE, STAGE 4 (SEVERE) (HCC): ICD-10-CM

## 2023-01-17 DIAGNOSIS — N28.89 KIDNEY MASS: Primary | ICD-10-CM

## 2023-01-17 DIAGNOSIS — E66.01 CLASS 3 SEVERE OBESITY DUE TO EXCESS CALORIES WITHOUT SERIOUS COMORBIDITY IN ADULT, UNSPECIFIED BMI (HCC): ICD-10-CM

## 2023-01-17 DIAGNOSIS — L40.50 PSORIASIS WITH ARTHROPATHY (HCC): ICD-10-CM

## 2023-01-17 NOTE — PROGRESS NOTES
Assessment/Plan:    Type 2 diabetes mellitus with complication, with long-term current use of insulin (HCC)    Lab Results   Component Value Date    HGBA1C 8 6 (H) 09/02/2022   Target A1c under 7 I have counselled the pt to follow a healthy and balanced diet ,and recommend routine exercise  I will be ordering diabetic laboratories including comprehensive metabolic panel, hemoglobin A1c, urine microalbumin, lipid panel  Working with endocrinology Dr Hiram Harris with arthropathy [facility])  Clinically stable and doing well continue the current medical regiment will continue monitor  Chronic kidney disease, stage 4 (severe) (HCC)  Drink adequate amount of water, avoid anti-inflammatories, reduce sodium in the diet and will continue to monitor the kidney function    Class 3 severe obesity due to excess calories without serious comorbidity in adult, unspecified BMI ([facility] [address] )  Obesity -I have counseled patient following healthy and balanced diet, I would like the patient to lose weight, I would like the patient exercise routinely; we will continue monitor the patient's progress  Fall  Went to ER no fracture identified start physical therapy    Kidney mass  We will have patient see urology for further evaluation noticed on CAT scan during ER evaluation    MDD (major depressive disorder), recurrent severe, without psychosis ([address] )   clinically stable and doing well continue the current medical regiment will continue monitor  No SI continue with current dose of Cymbalta she had worked with psychiatry in the past and prefers not to follow-up with psychiatry          Problem List Items Addressed This Visit        Endocrine    Type 2 diabetes mellitus with complication, with long-term current use of insulin ([facility] [address] ) (Chronic)       Lab Results   Component Value Date    HGBA1C 8 6 (H) 09/02/2022   Target A1c under 7 I have counselled the pt to follow a healthy and balanced diet ,and recommend routine exercise    I will be ordering diabetic laboratories including comprehensive metabolic panel, hemoglobin A1c, urine microalbumin, lipid panel  Working with endocrinology Dr Annalee Cushing metabolic panel    Microalbumin / creatinine urine ratio    Lipid Panel with Direct LDL reflex    Hemoglobin A1c (w/out EAG) ([facility] ONLY)    Microalbumin, Random Urine (W/Creatinine) ([facility] ONLY)       Musculoskeletal and Integument    Psoriasis with arthropathy (HCC)     Clinically stable and doing well continue the current medical regiment will continue monitor  Genitourinary    Chronic kidney disease, stage 4 (severe) (HCC)     Drink adequate amount of water, avoid anti-inflammatories, reduce sodium in the diet and will continue to monitor the kidney function            Other    MDD (major depressive disorder), recurrent severe, without psychosis ([address] )      clinically stable and doing well continue the current medical regiment will continue monitor  No SI continue with current dose of Cymbalta she had worked with psychiatry in the past and prefers not to follow-up with psychiatry          Class 3 severe obesity due to excess calories without serious comorbidity in adult, unspecified BMI ([facility] [address] )     Obesity -I have counseled patient following healthy and balanced diet, I would like the patient to lose weight, I would like the patient exercise routinely; we will continue monitor the patient's progress  Kidney mass - Primary     We will have patient see urology for further evaluation noticed on CAT scan during ER evaluation         Relevant Orders    Ambulatory Referral to Urology    Harvey Barriga to ER no fracture identified start physical therapy         Relevant Orders    Ambulatory Referral to Physical Therapy         Subjective:      Patient ID: Jayde Baxter is a 69 y o  female      HPI 74-year old female coming in for a follow up office visit regarding depression, type 2 diabetes, chronic kidney disease stage IV, obesity, kidney mass, status post fall he has psoriasis; the patient reports me compliant taking medications without untoward side effects the  The patient is here to review his medical condition, update me on the medical condition and the patient reports me no hospitalizations and 1 ER visits for fall  Pain and a fall last Thursday she had fall over her purse and landed on her hands primarily lateral hands there was a laceration of the left forearm therefore she went [facility] to [facility] emergency room  No head injury mood is relatively stable found to have a kidney mass on imaging  Mood is stable relatively ongoing anxiety still working on unpacking  is not helping the patient     No intracranial hemorrhage or calvarial fracture  The following portions of the patient's history were reviewed and updated as appropriate: allergies, current medications, past family history, past medical history, past social history, past surgical history and problem list     Review of Systems   Constitutional: Negative for activity change, appetite change and unexpected weight change  HENT: Negative for congestion and postnasal drip  Eyes: Negative for visual disturbance  Respiratory: Negative for cough and shortness of breath  Cardiovascular: Negative for chest pain  Gastrointestinal: Negative for abdominal pain, diarrhea, nausea and vomiting  Neurological: Negative for dizziness, light-headedness and headaches  Hematological: Negative for adenopathy  Psychiatric/Behavioral: Negative for suicidal ideas  The patient is nervous/anxious  Objective:    Return in 4 months (on 5/17/2023) for Diabetes follow-up  No results found        Allergies   Allergen Reactions   • Molds & Smuts Allergic Rhinitis   • Other Allergic Rhinitis     RAGWEED, CAT DANDER, DOG DANDER    • Pollen Extract Other (See Comments)     Cold symptoms         Past Medical History:   Diagnosis Date   • Acute embolism and thrombosis of unspecified deep veins of unspecified lower extremity (HCC)     Last Assessed:  5/18/17   • Anemia    • Anosmia    • Anxiety    • Arthritis    • Asthma    • Back pain    • Bilateral macular retinal edema    • CAD (coronary artery disease)    • Cataract    • Cervical disc herniation    • Cervical radiculopathy    • Cervical spinal stenosis    • Cervical spondylolysis    • Chronic kidney disease    • Chronic mastoiditis    • Colon polyp    • Complex endometrial hyperplasia    • Depression    • Diabetes mellitus (HCC)    • Disease of thyroid gland    • DVT (deep venous thrombosis) (HCC)    • Fibromyalgia    • Fibromyalgia, primary    • Hyperlipidemia    • Hypertension    • Hypothyroid    • Kidney stone    • Lumbar radiculopathy    • Neck pain    • Obese    • PONV (postoperative nausea and vomiting)    • Shingles 07/01/2021   • Spinal stenosis    • Stomach ulcer    • Thyroid disease      Past Surgical History:   Procedure Laterality Date   • BACK SURGERY     • CARPAL TUNNEL RELEASE     • CATARACT EXTRACTION     • CHOLECYSTECTOMY     • COLONOSCOPY     • CORONARY ANGIOPLASTY     • CORONARY ARTERY BYPASS GRAFT     • CYSTOSCOPY N/A 6/20/2017    Procedure: CYSTOSCOPY;  Surgeon: Doron Pineda MD;  [facility]: [facility];  Service: Gynecology Oncology   • CYSTOSCOPY     • DE LAPS TOTAL HYSTERECT 250 GM/< W/RMVL TUBE/OVARY N/A 6/20/2017    Procedure: ROBOTIC HYSTERECTOMY; BILATERAL SALPINO-OOPHERECTOMY; umbilical hernia repair ;  Surgeon: Doron Pineda MD;  Location: [facility];  Service: Gynecology Oncology   • DE REPAIR FIRST ABDOMINAL WALL HERNIA N/A 5/12/2022    Procedure: REPAIR HERNIA INCISIONAL;  Surgeon: Ibis Preciado DO;  Location: [facility];  Service: General   • TONSILECTOMY AND ADNOIDECTOMY     • TONSILLECTOMY     • UMBILICAL HERNIA REPAIR       Current Outpatient Medications on File Prior to Visit   Medication Sig Dispense Refill   • ALPRAZolam (XANAX) 0 5 mg tablet Take 1 tablet (0 5 mg total) by mouth 2 (two) times a day as needed for anxiety 60 tablet 0   • aspirin 81 MG tablet Take 81 mg by mouth daily     • B-D UF III MINI PEN NEEDLES 31G X 5 MM MISC      • BAQSIMI TWO PACK 3 MG/DOSE POWD Use as directed   0   • BD Insulin Syringe U/F 31G X 5/16" 0 5 ML MISC 4 (four) times a day As directed     • cholecalciferol (VITAMIN D3) 1,000 units tablet Take 2,000 Units by mouth daily     • Coenzyme Q10 (Co Q-10) 200 MG CAPS Take by mouth in the morning     • diclofenac sodium (VOLTAREN) 1 % Apply 2 g topically 4 (four) times a day (Patient taking differently: Apply 2 g topically as needed) 1 Tube 0   • diltiazem (TIAZAC) 300 MG 24 hr capsule Take 300 mg by mouth daily     • docusate sodium (COLACE) 100 mg capsule Take 1 capsule (100 mg total) by mouth 2 (two) times a day (Patient taking differently: Take 100 mg by mouth 2 (two) times a day as needed) 20 capsule 0   • DULoxetine (CYMBALTA) 60 mg delayed release capsule Take 1 capsule (60 mg total) by mouth daily 90 capsule 3   • insulin aspart (NovoLOG) 100 units/mL injection Inject 12 Units under the skin 3 (three) times a day before meals (Patient taking differently: Inject under the skin INSULIN PUMP)  0   • Insulin Disposable Pump (OmniPod Dash 5 Pack Pods) MISC USE 1 POD EVERY 1 5 DAYS     • Insulin Disposable Pump (OmniPod Dash 5 Pack Pods) MISC Omnipod Dash Insulin Pod subcutaneous cartridge   USE 1 POD EVERY 1 5 DAYS     • irbesartan (AVAPRO) 300 mg tablet Take 300 mg by mouth     • isosorbide mononitrate (IMDUR) 60 mg 24 hr tablet TAKE 1 5 TABs AT NIGHT     • Lantus 100 UNIT/ML subcutaneous injection      • levothyroxine 50 mcg tablet Take 50 mcg by mouth daily     • naloxone (NARCAN) 4 mg/0 1 mL nasal spray Administer 1 spray into a nostril  If no response after 2-3 minutes, give another dose in the other nostril using a new spray   1 each 1   • nitroglycerin (NITROLINGUAL) 0 4 mg/spray spray USE ONE SPRAY Q 5 MINUTES AS NEEDED FOR CHEST PAIN  IF NO RELIEF, CALL 911   1   • NovoLOG 100 UNIT/ML SOLN INFUSE UNDER THE SKIN VIA INSULIN PUMP AS DIRECTED  TOTAL DAILY DOSE  UNITS     • rosuvastatin (CRESTOR) 20 MG tablet Take 20 mg by mouth every evening    2   • traZODone (DESYREL) 50 mg tablet TAKE 0 5-1 TABLETS (25-50 MG TOTAL) BY MOUTH DAILY AT BEDTIME AS NEEDED FOR SLEEP 90 tablet 1   • enalapril (VASOTEC) 20 mg tablet Take 20 mg by mouth daily at bedtime   (Patient not taking: Reported on 2022)       No current facility-administered medications on file prior to visit  Family History   Problem Relation Age of Onset   • Arthritis Mother    • Leukemia Mother    • Other Mother         Anxiety, major depressive disorder, recurrent episode with atypical features   • Coronary artery disease Father         Heart problem   • Diabetes Father    • Other Father         Infectious disease   • Alzheimer's disease Maternal Grandmother    • Other Maternal Grandfather         Heart problem   • Other Daughter         Anxiety, major depressive disorder, recurrent episode with atypical features   • Alcohol abuse Other         Grandparent   • Cancer Family    • Diabetes Family    • Hypertension Family    • Other Family         Reported prior back trouble, thyroid disorder     Social History     Socioeconomic History   • Marital status: /Civil Union     Spouse name: Not on file   • Number of children: 2   • Years of education: High school or GED   • Highest education level: Not on file   Occupational History   • Occupation: Retired   Tobacco Use   • Smoking status: Former     Packs/day: 1 00     Years: 6 00     Pack years: 6 00     Types: Cigarettes     Quit date:      Years since quittin 0   • Smokeless tobacco: Never   • Tobacco comments:     Smoked about a half a pack for about 4 yrs  Quite about 50 yrs ago     Vaping Use   • Vaping Use: Never used   Substance and Sexual Activity   • Alcohol use: No   • Drug use: No   • Sexual activity: Not Currently     Comment: No known STD risk factors   Other Topics Concern   • Not on file   Social History Narrative    Lives independently with spouse    No known risk factors    Denied:  Exercising regularly     Social Determinants of Health     Financial Resource Strain: Low Risk    • Difficulty of Paying Living Expenses: Not hard at all   Food Insecurity: Not on file   Transportation Needs: No Transportation Needs   • Lack of Transportation (Medical): No   • Lack of Transportation (Non-Medical):  No   Physical Activity: Not on file   Stress: Not on file   Social Connections: Not on file   Intimate Partner Violence: Not on file   Housing Stability: Not on file     Vitals:    01/17/23 1608   BP: 119/74   Pulse: 71   Resp: 16   SpO2: 98%   Weight: 90 1 kg (198 lb 9 6 oz)   Height: 4' 11" (1 499 m)     Results for orders placed or performed during the hospital encounter of 10/27/22   CBC and differential   Result Value Ref Range    WBC 11 71 (H) 4 31 - 10 16 Thousand/uL    RBC 5 00 3 81 - 5 12 Million/uL    Hemoglobin 13 1 11 5 - 15 4 g/dL    Hematocrit 40 9 34 8 - 46 1 %    MCV 82 82 - 98 fL    MCH 26 2 (L) 26 8 - 34 3 pg    MCHC 32 0 31 4 - 37 4 g/dL    RDW 15 2 (H) 11 6 - 15 1 %    MPV 11 1 8 9 - 12 7 fL    Platelets 465 829 - 215 Thousands/uL    nRBC 0 /100 WBCs    Neutrophils Relative 69 43 - 75 %    Immat GRANS % 1 0 - 2 %    Lymphocytes Relative 22 14 - 44 %    Monocytes Relative 8 4 - 12 %    Eosinophils Relative 0 0 - 6 %    Basophils Relative 0 0 - 1 %    Neutrophils Absolute 8 01 (H) 1 85 - 7 62 Thousands/µL    Immature Grans Absolute 0 09 0 00 - 0 20 Thousand/uL    Lymphocytes Absolute 2 60 0 60 - 4 47 Thousands/µL    Monocytes Absolute 0 96 0 17 - 1 22 Thousand/µL    Eosinophils Absolute 0 01 0 00 - 0 61 Thousand/µL    Basophils Absolute 0 04 0 00 - 0 10 Thousands/µL   Basic metabolic panel   Result Value Ref Range    Sodium 135 135 - 147 mmol/L    Potassium 3 6 3 5 - 5 3 mmol/L    Chloride 100 96 - 108 mmol/L    CO2 24 21 - 32 mmol/L    ANION GAP 11 4 - 13 mmol/L    BUN 18 5 - 25 mg/dL    Creatinine 1 92 (H) 0 60 - 1 30 mg/dL    Glucose 231 (H) 65 - 140 mg/dL    Calcium 9 8 8 4 - 10 2 mg/dL    eGFR 25 ml/min/1 73sq m   Fingerstick Glucose (POCT)   Result Value Ref Range    POC Glucose 266 (H) 65 - 140 mg/dl     Weight (last 2 days)     None        Body mass index is 40 11 kg/m²  BP      Temp      Pulse     Resp      SpO2        Vitals:    01/17/23 1608   Weight: 90 1 kg (198 lb 9 6 oz)     Vitals:    01/17/23 1608   Weight: 90 1 kg (198 lb 9 6 oz)       /74   Pulse 71   Resp 16   Ht 4' 11" (1 499 m)   Wt 90 1 kg (198 lb 9 6 oz)   SpO2 98%   BMI 40 11 kg/m²          Physical Exam  Vitals and nursing note reviewed  Constitutional:       General: She is not in acute distress  Appearance: She is well-developed  She is not ill-appearing, toxic-appearing or diaphoretic  HENT:      Head: Normocephalic  Eyes:      General: No scleral icterus  Right eye: No discharge  Left eye: No discharge  Conjunctiva/sclera: Conjunctivae normal       Pupils: Pupils are equal, round, and reactive to light  Cardiovascular:      Rate and Rhythm: Normal rate and regular rhythm  Heart sounds: Normal heart sounds  No murmur heard  No friction rub  No gallop  Pulmonary:      Effort: No respiratory distress  Breath sounds: Normal breath sounds  No wheezing or rales  Abdominal:      General: Bowel sounds are normal  There is no distension  Palpations: Abdomen is soft  There is no mass  Tenderness: There is no abdominal tenderness  There is no guarding or rebound  Musculoskeletal:         General: No deformity  Cervical back: Neck supple  Lymphadenopathy:      Cervical: No cervical adenopathy  Neurological:      Mental Status: She is alert  Coordination: Coordination normal    Psychiatric:         Mood and Affect: Mood is anxious   Mood is not depressed  Thought Content: Thought content does not include suicidal ideation

## 2023-01-17 NOTE — PATIENT INSTRUCTIONS

## 2023-01-18 ENCOUNTER — TELEPHONE (OUTPATIENT)
Dept: UROLOGY | Facility: AMBULATORY SURGERY CENTER | Age: 78
End: 2023-01-18

## 2023-01-18 NOTE — TELEPHONE ENCOUNTER
Willie Molina, [facility] [place] [facility] 28 minutes ago (8:57 AM)     Can wait - will need further imaging   Reasonable to scheduled consult with AP to coordinate imaging      Please call and make appt with AP in the next few weeks

## 2023-01-18 NOTE — TELEPHONE ENCOUNTER
Complaint/Diagnosis:Kidney Mass    Insurance:[employer]    History of cancer:no    Previous urologist:no    Outside Testing/where:epic/care everywhere    If yes,what kind:CT Scan    Records requested/where:epic/care everywhere    Preferred location:[place]

## 2023-01-18 NOTE — TELEPHONE ENCOUNTER
Patient seen at [facility] after a fall  Trauma imaging obtained including CT abdomen and pelvis  Report available in care everywhere-  "Impression:   1  CT examination of the chest, abdomen and pelvis demonstrates no acute   disease  2  Multiple renal cortical lesions  There is a 1 cm cortical lesion lower outer   margin of the right kidney which does not represent a simple cyst  A small   enhancing neoplasm not excluded  A follow-up study performed prior to and after   intravenous contrast material is recommended  3  Stable small pulmonary nodule right middle lobe  4  Arteriosclerotic disease with narrowing at the origin of the patent superior   mesenteric artery  Kidneys/Ureters: Renal functions symmetrical  No hydronephrosis  2 mm calculus   mid to upper right kidney  There are multiple hypodense renal cortical lesions  Cortical lesion lower outer margin of the right kidney measuring 1 cm within   internal attenuation of 66 HU, an enhancing lesion is a consideration  1 8 cm   hypodense cortical lesion lower medial pole of the right kidney with an internal   attenuation of 11, likely a benign cyst  1 4 cm hypodense cortical lesion   posterior mid left kidney which may be mildly complex

## 2023-01-19 ENCOUNTER — NURSE TRIAGE (OUTPATIENT)
Dept: OTHER | Facility: OTHER | Age: 78
End: 2023-01-19

## 2023-01-19 NOTE — TELEPHONE ENCOUNTER
Regarding: bandage question  ----- Message from Monster Agee sent at 1/19/2023  1:35 PM EST -----  " The bandage that Dr Hugo Post put on has fallen off and I wanted to know if I should put on another one or let dry out "

## 2023-01-19 NOTE — TELEPHONE ENCOUNTER
General wound care advice given to patient  She states that the are is still open  I advised her to be seen but she declined to be seen today or tomorrow  We discuss doing a layer of triple antibiotic oint and a non adherent pad to allow for that area to heal without the dressing sticking to it  She will call back with any worsening symptoms  Reason for Disposition  • Minor cut or scratch    Answer Assessment - Initial Assessment Questions  1  APPEARANCE of INJURY: "What does the injury look like?"         I was in on Friday 1/13/23 after I fell  He redressed it then     The bandage is no longer attached but it' still oozing"    Protocols used: CUTS AND LACERATIONS-ADULT-OH

## 2023-01-20 NOTE — ED PROVIDER NOTES
History  Chief Complaint   Patient presents with    Neck Pain     PT c/o "right gland and ear pain since Tuesday" (PT has doctor appt for this afternoon)     76 y/o female presents today for evaluation of right sided neck pain  States the pain started 2 days ago and she had an appointment to see her PCP today but she couldn't wait because the pain was so bad last night  She denies fever  States her  noticed a rash that developed this morning  History provided by:  Patient  Neck Pain  Pain location:  R side  Quality:  Burning  Pain radiates to:  Does not radiate  Pain severity:  Moderate  Onset quality:  Gradual  Duration:  2 days  Timing:  Constant  Progression:  Worsening  Chronicity:  New  Relieved by:  None tried  Worsened by:  Nothing  Ineffective treatments:  None tried  Associated symptoms: no bladder incontinence, no bowel incontinence, no fever, no headaches, no leg pain, no numbness, no paresis, no tingling, no visual change and no weakness    Risk factors: no hx of head and neck radiation, no hx of spinal trauma, no recent epidural, no recent head injury and no recurrent falls        Prior to Admission Medications   Prescriptions Last Dose Informant Patient Reported? Taking?    ALPRAZolam (XANAX) 0 5 mg tablet  Self No No   Sig: Take 1 tablet (0 5 mg total) by mouth 2 (two) times a day as needed for anxiety   B-D UF III MINI PEN NEEDLES 31G X 5 MM MISC  Self Yes No   BAQSIMI TWO PACK 3 MG/DOSE POWD  Self Yes No   Sig: Use as directed   Insulin Disposable Pump (OMNIPOD DASH 5 PACK) MISC  Self Yes No   Sig: CHANGE PODS EVERY 2 DAYS UTD   Insulin Disposable Pump (OmniPod Dash 5 Pack Pods) MISC   Yes No   Sig: USE 1 POD EVERY 1 5 DAYS   Levomilnacipran HCl ER (Fetzima) 20 MG extended release capsule   No No   Sig: Take 1 capsule (20 mg total) by mouth daily   albuterol (2 5 mg/3 mL) 0 083 % nebulizer solution  Self No No   Sig: Take 3 mL (2 5 mg total) by nebulization every 6 (six) hours as January 20, 2023     Patient: Rylei Powell   YOB: 2005   Date of Visit: 1/20/2023       To Whom it May Concern:    Rylei Powell was seen in my clinic on 1/20/2023. She may return to school on 1/23/2023.    If you have any questions or concerns, please don't hesitate to call.         Sincerely,          Will Sawant LCSW        CC:   No Recipients   needed for wheezing   albuterol (PROAIR HFA) 90 mcg/act inhaler  Self Yes No   Sig: Inhale 2 puffs every 6 (six) hours as needed    aspirin 81 MG tablet  Self Yes No   Sig: Take 81 mg by mouth daily   betamethasone dipropionate 0 05 % lotion  Self Yes No   cholecalciferol (VITAMIN D3) 1,000 units tablet  Self Yes No   Sig: Take 2,000 Units by mouth daily   cyclobenzaprine (FLEXERIL) 5 mg tablet   No No   Sig: Take 1 tablet (5 mg total) by mouth daily at bedtime for 14 days   diclofenac sodium (VOLTAREN) 1 %  Self No No   Sig: Apply 2 g topically 4 (four) times a day   Patient not taking: Reported on 4/16/2021   diltiazem (TIAZAC) 300 MG 24 hr capsule  Self Yes No   Sig: Take 300 mg by mouth daily   docusate sodium (COLACE) 100 mg capsule  Self No No   Sig: Take 1 capsule (100 mg total) by mouth 2 (two) times a day   enalapril (VASOTEC) 20 mg tablet  Self Yes No   Sig: Take 20 mg by mouth daily   insulin aspart (NovoLOG) 100 units/mL injection  Self No No   Sig: Inject 12 Units under the skin 3 (three) times a day before meals   Patient taking differently: Inject under the skin 3 (three) times a day before meals    isosorbide mononitrate (IMDUR) 60 mg 24 hr tablet  Self Yes No   Sig: Take 60 mg by mouth daily at bedtime   levothyroxine 50 mcg tablet  Self Yes No   Sig: Take 50 mcg by mouth daily   nitroglycerin (NITROLINGUAL) 0 4 mg/spray spray  Self Yes No   Sig: USE ONE SPRAY Q 5 MINUTES AS NEEDED FOR CHEST PAIN  IF NO RELIEF, CALL 911     rosuvastatin (CRESTOR) 20 MG tablet  Self Yes No   Sig: Take 20 mg by mouth every evening   simethicone (MYLICON,GAS-X) 529 MG capsule  Self No No   Sig: Take 1 capsule (180 mg total) by mouth every 12 (twelve) hours      Facility-Administered Medications: None       Past Medical History:   Diagnosis Date    Acute embolism and thrombosis of unspecified deep veins of unspecified lower extremity ([address] )     Last Assessed:  5/18/17    Anemia     Anosmia     Anxiety     Arthritis  Asthma     Back pain     Bilateral macular retinal edema     CAD (coronary artery disease)     Cataract     Cervical disc herniation     Cervical radiculopathy     Cervical spinal stenosis     Cervical spondylolysis     Chronic mastoiditis     Complex endometrial hyperplasia     Depression     Diabetes mellitus (HCC)     DVT (deep venous thrombosis) (HCC)     Fibromyalgia     Hyperlipidemia     Hypertension     Hypothyroid     Lumbar radiculopathy     Neck pain     Obese     Spinal stenosis     Stomach ulcer     Thyroid disease        Past Surgical History:   Procedure Laterality Date    BACK SURGERY      CARPAL TUNNEL RELEASE      CATARACT EXTRACTION      CHOLECYSTECTOMY      COLONOSCOPY      CORONARY ANGIOPLASTY      CORONARY ARTERY BYPASS GRAFT      CYSTOSCOPY N/A 6/20/2017    Procedure: Yun Limber;  Surgeon: Abhay Maldonado MD;  [facility];  Service: Gynecology Oncology    [place] LAPAROSCOPY W TOT HYSTERECTUTERUS <=250 GRAM  W TUBE/OVARY N/A 6/20/2017    Procedure: ROBOTIC HYSTERECTOMY; BILATERAL SALPINO-OOPHERECTOMY; umbilical hernia repair ;  Surgeon: Abhay Maldonado MD;  [facility];  Service: Gynecology Oncology    TONSILECTOMY AND ADNOIDECTOMY      UMBILICAL HERNIA REPAIR         Family History   Problem Relation Age of Onset    Arthritis Mother     Leukemia Mother     Other Mother         Anxiety, major depressive disorder, recurrent episode with atypical features    Coronary artery disease Father         Heart problem    Diabetes Father     Other Father         Infectious disease    Alzheimer's disease Maternal Grandmother     Other Maternal Grandfather         Heart problem    Other Daughter         Anxiety, major depressive disorder, recurrent episode with atypical features    Alcohol abuse Other         Grandparent    Cancer Family     Diabetes Family     Hypertension Family     Other Family         Reported prior back trouble, thyroid disorder     I have reviewed and agree with the history as documented  E-Cigarette/Vaping    E-Cigarette Use Never User      E-Cigarette/Vaping Substances    Nicotine No     THC No     CBD No     Flavoring No     Other No     Unknown No      Social History     Tobacco Use    Smoking status: Former Smoker     Packs/day: 1 00     Years: 6 00     Pack years: 6 00     Types: Cigarettes     Quit date:      Years since quittin 5    Smokeless tobacco: Never Used    Tobacco comment: Smoked about a half a pack for about 4 yrs  Quite about 50 yrs ago  Vaping Use    Vaping Use: Never used   Substance Use Topics    Alcohol use: No    Drug use: No       Review of Systems   Constitutional: Negative for chills, fatigue and fever  HENT: Negative for congestion, postnasal drip, sore throat and trouble swallowing  Eyes: Negative for visual disturbance  Respiratory: Negative for chest tightness and shortness of breath  Cardiovascular: Negative for leg swelling  Gastrointestinal: Negative for abdominal pain and bowel incontinence  Genitourinary: Negative for bladder incontinence and dysuria  Musculoskeletal: Positive for neck pain  Skin: Positive for rash (right neck)  Allergic/Immunologic: Negative for immunocompromised state  Neurological: Negative for dizziness, tingling, weakness, light-headedness, numbness and headaches  Psychiatric/Behavioral: Negative for confusion  Physical Exam  Physical Exam  Vitals and nursing note reviewed  Constitutional:       Appearance: She is well-developed  HENT:      Head: Normocephalic and atraumatic  Mouth/Throat:      Mouth: Mucous membranes are moist       Pharynx: Uvula midline  Tonsils: No tonsillar exudate  Eyes:      Pupils: Pupils are equal, round, and reactive to light  Neck:      Comments: Vesicular rash present on the right side of the neck  Does not cross the midline  Is not tender to touch    Mild right sided paracervical muscle spasm  No nuchal rigidity  Cardiovascular:      Rate and Rhythm: Normal rate and regular rhythm  Pulmonary:      Effort: Pulmonary effort is normal       Breath sounds: Normal breath sounds  Abdominal:      General: Bowel sounds are normal       Palpations: Abdomen is soft  Tenderness: There is no abdominal tenderness  There is no guarding or rebound  Musculoskeletal:         General: No tenderness or deformity  Cervical back: Normal range of motion and neck supple  Skin:     General: Skin is warm and dry  Capillary Refill: Capillary refill takes less than 2 seconds  Findings: Rash present  Neurological:      General: No focal deficit present  Mental Status: She is alert and oriented to person, place, and time  Comments: Patient moving all extremities equally, no focal neuro deficits noted  Psychiatric:         Mood and Affect: Mood normal          Behavior: Behavior normal          Vital Signs  ED Triage Vitals [07/01/21 0856]   Temperature Pulse Respirations Blood Pressure SpO2   97 9 °F (36 6 °C) 89 20 (!) 233/93 99 %      Temp Source Heart Rate Source Patient Position - Orthostatic VS BP Location FiO2 (%)   Oral Monitor Lying Right arm --      Pain Score       9           Vitals:    07/01/21 0856   BP: (!) 233/93   Pulse: 89   Patient Position - Orthostatic VS: Lying         Visual Acuity      ED Medications  Medications   valACYclovir (VALTREX) tablet 1,000 mg (has no administration in time range)       Diagnostic Studies  Results Reviewed     None                 No orders to display              Procedures  Procedures         ED Course                                           MDM  Number of Diagnoses or Management Options  Herpes zoster without complication: new and requires workup  Neck pain: new and requires workup  Diagnosis management comments: History and physical exam are consistent with Acute zoster infection    Patient is non toxic appearing  No signs of systemic illness  Will start Valacyclovir here in the ED and send Rx to pharmacy  Stable for discharge  RTED instructions reviewed  F/u with PCP as outpatient  Risk of Complications, Morbidity, and/or Mortality  Presenting problems: low  Diagnostic procedures: low  Management options: low    Patient Progress  Patient progress: stable      Disposition  Final diagnoses:   Neck pain   Herpes zoster without complication     Time reflects when diagnosis was documented in both MDM as applicable and the Disposition within this note     Time User Action Codes Description Comment    7/1/2021  9:24 AM Manuel Bhatti Add [M54 2] Neck pain     7/1/2021  9:24 AM Manuel Bhatti Add [B02 9] Herpes zoster without complication       ED Disposition     ED Disposition Condition Date/Time Comment    Discharge Stable Thu Jul 1, 2021  9:24 AM Mónica Winn discharge to home/self care  Follow-up Information     Follow up With Specialties Details Why [address]  [facility] Internal Medicine Schedule an appointment as soon as possible for a visit   [address]  [phone]            Patient's Medications   Discharge Prescriptions    VALACYCLOVIR (VALTREX) 1,000 MG TABLET    Take 1 tablet (1,000 mg total) by mouth 3 (three) times a day for 7 days       Start Date: 7/1/2021  End Date: 7/8/2021       Order Dose: 1,000 mg       Quantity: 21 tablet    Refills: 0     No discharge procedures on file      PDMP Review     None          ED Provider  Electronically Signed by           Bryan Monsalve DO  07/01/21 2089

## 2023-01-22 PROBLEM — N18.4 CHRONIC KIDNEY DISEASE, STAGE 4 (SEVERE) (HCC): Status: ACTIVE | Noted: 2023-01-22
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
## 2023-01-22 PROBLEM — N28.89 KIDNEY MASS: Status: ACTIVE | Noted: 2023-01-22

## 2023-01-22 NOTE — ASSESSMENT & PLAN NOTE
Lab Results   Component Value Date    HGBA1C 8 6 (H) 09/02/2022   Target A1c under 7 I have counselled the pt to follow a healthy and balanced diet ,and recommend routine exercise  I will be ordering diabetic laboratories including comprehensive metabolic panel, hemoglobin A1c, urine microalbumin, lipid panel    Working with endocrinology Dr Pandey Gist

## 2023-01-26 ENCOUNTER — TELEPHONE (OUTPATIENT)
Dept: NEUROSURGERY | Facility: CLINIC | Age: 78
End: 2023-01-26

## 2023-01-26 DIAGNOSIS — F41.1 GAD (GENERALIZED ANXIETY DISORDER): ICD-10-CM

## 2023-01-26 DIAGNOSIS — F41.0 PANIC ATTACKS: ICD-10-CM

## 2023-01-26 DIAGNOSIS — F41.9 ANXIETY: ICD-10-CM

## 2023-01-26 NOTE — TELEPHONE ENCOUNTER
1/26/2023 [3:05 PM] Saverio Krabbe can someone call Maria Esther Dotson [birth year] she called about authorizations about her imaging   I called pt back per michelle request -LM  on voice mail to call our office back looks like last note datted from may in her mri lumbar order states mri lumbar denied no conservative treatmment _BA     Spoke with pt she advised she will be starting pt again she did have a few sessions but stopped - advised they need to see documentation for pt at least 6 weeks of conservative treatment she will call us back to start new auth after she completes she has moved 3 times in 3 months _BA

## 2023-01-30 RX ORDER — ALPRAZOLAM 0.5 MG/1
0.5 TABLET ORAL 2 TIMES DAILY PRN
Qty: 60 TABLET | Refills: 0 | Status: SHIPPED | OUTPATIENT
Start: 2023-01-30

## 2023-02-09 PROBLEM — Z00.00 MEDICARE ANNUAL WELLNESS VISIT, SUBSEQUENT: Status: RESOLVED | Noted: 2022-12-11 | Resolved: 2023-02-09

## 2023-02-09 PROBLEM — Z13.820 SCREENING FOR OSTEOPOROSIS: Status: RESOLVED | Noted: 2022-12-11 | Resolved: 2023-02-09

## 2023-02-16 ENCOUNTER — EVALUATION (OUTPATIENT)
Dept: PHYSICAL THERAPY | Facility: CLINIC | Age: 78
End: 2023-02-16

## 2023-02-16 DIAGNOSIS — G89.29 CHRONIC BILATERAL LOW BACK PAIN WITHOUT SCIATICA: ICD-10-CM

## 2023-02-16 DIAGNOSIS — M25.561 CHRONIC PAIN OF BOTH KNEES: ICD-10-CM

## 2023-02-16 DIAGNOSIS — G89.29 CHRONIC PAIN OF BOTH KNEES: ICD-10-CM

## 2023-02-16 DIAGNOSIS — R26.89 ABNORMALITY OF GAIT DUE TO IMPAIRMENT OF BALANCE: ICD-10-CM

## 2023-02-16 DIAGNOSIS — M54.50 CHRONIC BILATERAL LOW BACK PAIN WITHOUT SCIATICA: ICD-10-CM
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
## 2023-02-16 DIAGNOSIS — M25.562 CHRONIC PAIN OF BOTH KNEES: ICD-10-CM

## 2023-02-16 NOTE — PROGRESS NOTES
PT Evaluation     Today's date: 2023  Patient name: Vanessa Weaver  :   MRN: [id number]  Referring provider: Nino Cook DO  Dx:   Encounter Diagnosis     ICD-10-CM    1  Fall, initial encounter  [address] [facility] [address]  XXXA Ambulatory Referral to Physical Therapy     PT plan of care cert/re-cert      2  Abnormality of gait due to impairment of balance  R26 89 PT plan of care cert/re-cert      3  Chronic bilateral low back pain without sciatica  M54 50     G89 29       4  Chronic pain of both knees  M25 561     M25 562     G89 29           Start Time: 1000  Stop Time: 1045  Total time in clinic (min): 45 minutes    Assessment  Assessment details: Vanessa Weaver is a pleasant 69 y o  female who presents with chronic back and bilateral knee pain and balance impairments  She walks with [facility] and is at high risk for falls based on TUG  The patient's greatest concerns are concern at no signs of improvement and fear of not being able to keep active  No further referral appears necessary at this time based upon examination results  Primary movement impairment diagnosis of lumbar and knee ROM resulting in pathoanatomical symptoms of Fall, initial encounter  (primary encounter diagnosis)  Abnormality of gait due to impairment of balance and limiting her ability to carry, drive, exercise or recreation, get off the toilet, get out of a chair, lift, perform household chores, sit, sleep, stand and walk  Impairments include:  1) lumbar and knee ROM  2) core, hip, knee strength  3) balance    Discussed risks, benefits, and alternatives to treatment, and answered all patient questions to patient satisfaction    Impairments: abnormal muscle firing, abnormal muscle tone, abnormal or restricted ROM, abnormal movement, impaired physical strength, lacks appropriate home exercise program, pain with function and poor posture   Barriers to therapy: Financial barriers  Understanding of Dx/Px/POC: good   Prognosis: good    Goals  Impairment Goals 4-6 weeks  - Decrease pain to <4/10  - Improve 4 step balance test to 40/40  - Improve TUG to within age related norms  - Increase knee strength to 4/5 throughout  - Increase hip strength to 4/5 throughout    Functional Goals 6-8 weeks  - Return to Prior Level of Function  - Patient will be independent with HEP  - Patient will be able to walk safely without increased pain/compensation/difficulty  - Patient will be able to perform sit to stand without increased pain/compensation/difficulty   - Patient will be able to ascend and descend stairs without increased pain/compensation/difficulty       Plan  Plan details: Prognosis above is given PT services 2x/week tapering to 1x/week over the next 2 months and home program adherence  Patient would benefit from: skilled physical therapy  Planned modality interventions: cryotherapy, TENS, thermotherapy: hydrocollator packs and unattended electrical stimulation  Planned therapy interventions: abdominal trunk stabilization, behavior modification, body mechanics training, breathing training, flexibility, functional ROM exercises, home exercise program, joint mobilization, manual therapy, massage, Barboza taping, muscle pump exercises, neuromuscular re-education, patient education, postural training, strengthening, stretching, therapeutic activities, therapeutic exercise, therapeutic training, balance, gait training and transfer training  Frequency: 2x week  Duration in weeks: 8  Treatment plan discussed with: patient        Subjective Evaluation    History of Present Illness  Mechanism of injury: WORK/SCHOOL: not working  HOME LIFE: ranch home, has moved a lot recently, lives with   HOBBIES/EXERCISE: not exercising  PLOF:  Laminectomy 1985  HISTORY OF CURRENT INJURY:  Patient was having trouble with her knees and her PCP sent her to ortho who took x-rays which showed advanced OA both sides   They then referred her to a surgeon who suggested the pain may be coming from her back  She tried to get an MRI of her thoracic spine but the insurance company denied it because she has not tried PT  She was living at [facility] home and was getting PT through [facility] for 5 visits  She left that place in 2022, and now lives in a regular home, ranch, cement floor  She tripped over her purse and fell forward and scraped up her arm  This caused a trip to the ER  She knows that she needs to get more PT and she has chronic issues with her lower back  She has already tried everything for her back including pain management without relief  She has been in PT for her back several times and did not find any relief  She uses [facility] all the time due to knee pain  She knows she has bad balance and an issue walking  She knows she has stenosis  She also has fibromyalgia which impacts her daily  She is very tired and sleeps late all the time which is new for her  PAIN LOCATION/DESCRIPTORS: Pain in nat[place] knees, also above and below her knees  Pain in back is in the lower back on both sides, but mainly on the L side  AGGRAVATING FACTORS:  Walking, getting out of a chair, bending forward, standing to do dishes, heavy lifting, weather  EASES: sitting down, resting  DAY PATTERN: none  IMAGING:  Severe OA knees  SPECIAL QUESTIONS:    Isabel Lay denies a new onset of Bladder incontinence, Bowel dysfunction, History of cancer, Tingling and Numbness  PATIENT GOALS: Patient reports that her main goal is to be able to be more active    Pain  Current pain ratin  At best pain ratin  At worst pain ratin  Location: low back  Quality: dull ache, discomfort, tight and sharp  Progression: no change    Patient Goals  Patient goals for therapy: decreased pain, improved balance, increased motion, increased strength, independence with ADLs/IADLs and return to sport/leisure activities          Objective     Active Range of Motion     Lumbar   Flexion: Restriction level: moderate  Extension:  Restriction level: maximal  Left lateral flexion:  with pain Restriction level: maximal  Right lateral flexion:  with pain Restriction level: maximal  Left rotation:  Restriction level: moderate  Right rotation:  Restriction level: maximal  Left Knee   Flexion: 100 degrees with pain  Extension: -10 degrees     Right Knee   Flexion: 100 degrees   Extension: -5 degrees     Strength/Myotome Testing     Left Hip   Planes of Motion   Flexion: 4-  External rotation: 4-  Internal rotation: 4    Right Hip   Planes of Motion   Flexion: 4-  External rotation: 4-  Internal rotation: 4    Left Knee   Flexion: 4-  Extension: 4-    Right Knee   Flexion: 4-  Extension: 4-    Left Ankle/Foot   Dorsiflexion: 4-    Right Ankle/Foot   Dorsiflexion: 4-    General Comments:      Lumbar Comments  Sit to stand: difficulty getting out of the chair  Standing posture: diego knees bend in standing, varus bilaterally with hips externally rotated  TUG: SPC required, 24 seconds with narrow PROSPER, plop into chair  30 sec STS: 6 reps using hands at first then not needed  4 step balance test: 22/40 with difficulty in tandem and SLS             Diagnosis: Diego knee pain, LBP, balance   Precautions: complex PMH   Primary Goals: 1) lumbar and knee ROM  2) core, hip, knee strength  3) balance   *asterisks by exercise = given for HEP   Manuals 2/16       PROM knees        Tibfem distraction                                There Ex        Bike?         FF in chair        3 way ball rolls        LTR        Seated HS S        Heel slides        Standing calf S                        Neuro Re-Ed        TA        Kyle        Clam        Standing 3 way SLR        Biodex        Tandem stance        SLS        Gait training with obstacles        Standing on foam                        Pain neuroscience education         Re-evaluation              Ther Act                                         Modalities             Heat

## 2023-02-19 ENCOUNTER — TELEPHONE (OUTPATIENT)
Dept: OTHER | Facility: OTHER | Age: 78
End: 2023-02-19

## 2023-02-19 NOTE — TELEPHONE ENCOUNTER
Patient called in with some concerns that she may contract covid since her  has been diagnosed with covid and hospitalized  I instructed the patient to monitor any symptoms and to call the office if she feels unwell

## 2023-02-20 ENCOUNTER — TELEMEDICINE (OUTPATIENT)
Dept: INTERNAL MEDICINE CLINIC | Facility: CLINIC | Age: 78
End: 2023-02-20

## 2023-02-20 ENCOUNTER — HOSPITAL ENCOUNTER (EMERGENCY)
Facility: HOSPITAL | Age: 78
Discharge: HOME | End: 2023-02-20
Attending: EMERGENCY MEDICINE

## 2023-02-20 VITALS
TEMPERATURE: 98.7 F | HEART RATE: 81 BPM | RESPIRATION RATE: 17 BRPM | OXYGEN SATURATION: 98 % | DIASTOLIC BLOOD PRESSURE: 72 MMHG | SYSTOLIC BLOOD PRESSURE: 166 MMHG

## 2023-02-20 DIAGNOSIS — U07.1 COVID-19: Primary | ICD-10-CM

## 2023-02-20 LAB
BACTERIA UR QL AUTO: ABNORMAL /HPF
BILIRUB UR QL STRIP: NEGATIVE
CLARITY UR: CLEAR
COLOR UR: ABNORMAL
FLUAV RNA RESP QL NAA+PROBE: NEGATIVE
FLUBV RNA RESP QL NAA+PROBE: NEGATIVE
GLUCOSE SERPL-MCNC: 244 MG/DL (ref 65–140)
GLUCOSE UR STRIP-MCNC: ABNORMAL MG/DL
HGB UR QL STRIP.AUTO: NEGATIVE
KETONES UR STRIP-MCNC: NEGATIVE MG/DL
LEUKOCYTE ESTERASE UR QL STRIP: NEGATIVE
NITRITE UR QL STRIP: NEGATIVE
NON-SQ EPI CELLS URNS QL MICRO: ABNORMAL /HPF
PH UR STRIP.AUTO: 5 [PH]
PROT UR STRIP-MCNC: ABNORMAL MG/DL
RBC #/AREA URNS AUTO: ABNORMAL /HPF
RSV RNA RESP QL NAA+PROBE: NEGATIVE
SARS-COV-2 RNA RESP QL NAA+PROBE: POSITIVE
SP GR UR STRIP.AUTO: 1.02 (ref 1–1.03)
UROBILINOGEN UR STRIP-ACNC: <2 MG/DL
WBC #/AREA URNS AUTO: ABNORMAL /HPF

## 2023-02-20 RX ORDER — MOLNUPIRAVIR 200 MG/1
800 CAPSULE ORAL EVERY 12 HOURS
Qty: 40 CAPSULE | Refills: 0 | Status: SHIPPED | OUTPATIENT
Start: 2023-02-20 | End: 2023-02-25

## 2023-02-20 NOTE — PROGRESS NOTES
COVID-19 Outpatient Progress Note    Assessment/Plan:    Problem List Items Addressed This Visit        Other    COVID-19 - Primary     Suspected [id number] the patient does report to me increase of shortness of breath I did have her checked her pulse ox that was 90%, I she reports to me feeling very weak I did request the patient to be evaluated in the [address] [place] [address] ER ADT 21 completed patient will get a ride from a family member to the ER if she is unable to find someone to drive her she will call 911 to be transported to the ER   RTO after evaluation call if any change, or if her symptoms not juan         Relevant Orders    Transfer to other facility (Completed)      Disposition:     Because the patient's shortness of breath and low pulse ox 90% I did advise her to go to the ER for further evaluation    I have spent 20 minutes directly with the patient  Greater than 50% of this time was spent in counseling/coordination of care regarding: risks and benefits of treatment options, instructions for management and impressions  Encounter provider: Spencer Boyd DO     Provider located at: [id number] [address] [address]     Recent Visits  No visits were found meeting these conditions  Showing recent visits within past 7 days and meeting all other requirements  Today's Visits  Date Type Provider Dept   02/20/23 Telemedicine [address] today's visits and meeting all other requirements  Future Appointments  No visits were found meeting these conditions  Showing future appointments within next 150 days and meeting all other requirements     This virtual check-in was done via telephone and she agrees to proceed  Patient agrees to participate in a virtual check in via telephone or video visit instead of presenting to the office to address urgent/immediate medical needs   Patient is aware this is a billable service  She acknowledged consent and understanding of privacy and security of the video platform  The patient has agreed to participate and understands they can discontinue the visit at any time  After connecting through Telephone, the patient was identified by name and date of birth  Donald Chávez was informed that this was a telemedicine visit and that the exam was being conducted confidentially over secure lines  My office door was closed  No one else was in the room  Donald Chávez acknowledged consent and understanding of privacy and security of the telemedicine visit  I informed the patient that I have reviewed her record in Epic and presented the opportunity for her to ask any questions regarding the visit today  The patient agreed to participate  It was my intent to perform this visit via video technology but the patient was not able to do a video connection so the visit was completed via audio telephone only  Verification of patient location:  Patient is located in the following state in which I hold an active license: [place]    Subjective: Donald Chávez is a 69 y o  female who is concerned about COVID-19  Patient's symptoms include fever, fatigue, malaise, sore throat, loss of taste (chronic), cough and shortness of breath (chronic mild has seen pulm)  Patient denies chills, abdominal pain, nausea, vomiting, diarrhea, myalgias and headaches       - Date of symptom onset: 2/18/2023  - Date of exposure: 2/16/2023      COVID-19 vaccination status: Fully vaccinated with booster    Exposure:   Contact with a person who is under investigation (PUI) for or who is positive for COVID-19 within the last 14 days?: Yes    Hospitalized recently for fever and/or lower respiratory symptoms?: No      Currently a healthcare worker that is involved in direct patient care?: No      Works in a special setting where the risk of COVID-19 transmission may be high? (this may include long-term care, correctional and prison facilities; homeless shelters; assisted-living facilities and group homes ): No      Resident in a special setting where the risk of COVID-19 transmission may be high? (this may include long-term care, correctional and prison facilities; homeless shelters; assisted-living facilities and group homes ): No      Gas sl wheezing; dry throat    No results found for: [place] Duglas, SARSCORONAVI, CORONAVIRUSR, [facility] [place], [facility]    Review of Systems   Constitutional: Positive for fatigue and fever  Negative for chills  HENT: Positive for sore throat  Respiratory: Positive for cough and shortness of breath (chronic mild has seen pulm)  Gastrointestinal: Negative for abdominal pain, diarrhea, nausea and vomiting  Musculoskeletal: Negative for myalgias  Neurological: Negative for headaches       Current Outpatient Medications on File Prior to Visit   Medication Sig   • ALPRAZolam (XANAX) 0 5 mg tablet Take 1 tablet (0 5 mg total) by mouth 2 (two) times a day as needed for anxiety   • aspirin 81 MG tablet Take 81 mg by mouth daily   • B-D UF III MINI PEN NEEDLES 31G X 5 MM MISC    • BAQSIMI TWO PACK 3 MG/DOSE POWD Use as directed    • BD Insulin Syringe U/F 31G X 5/16" 0 5 ML MISC 4 (four) times a day As directed   • cholecalciferol (VITAMIN D3) 1,000 units tablet Take 2,000 Units by mouth daily   • Coenzyme Q10 (Co Q-10) 200 MG CAPS Take by mouth in the morning   • diclofenac sodium (VOLTAREN) 1 % Apply 2 g topically 4 (four) times a day (Patient taking differently: Apply 2 g topically as needed)   • diltiazem (TIAZAC) 300 MG 24 hr capsule Take 300 mg by mouth daily   • docusate sodium (COLACE) 100 mg capsule Take 1 capsule (100 mg total) by mouth 2 (two) times a day (Patient taking differently: Take 100 mg by mouth 2 (two) times a day as needed)   • DULoxetine (CYMBALTA) 60 mg delayed release capsule Take 1 capsule (60 mg total) by mouth daily   • insulin aspart (NovoLOG) 100 units/mL injection Inject 12 Units under the skin 3 (three) times a day before meals (Patient taking differently: Inject under the skin INSULIN PUMP)   • Insulin Disposable Pump (OmniPod Dash 5 Pack Pods) MISC USE 1 POD EVERY 1 5 DAYS   • Insulin Disposable Pump (OmniPod Dash 5 Pack Pods) MISC Omnipod Dash Insulin Pod subcutaneous cartridge   USE 1 POD EVERY 1 5 DAYS   • irbesartan (AVAPRO) 300 mg tablet Take 300 mg by mouth   • isosorbide mononitrate (IMDUR) 60 mg 24 hr tablet TAKE 1 5 TABs AT NIGHT   • Lantus 100 UNIT/ML subcutaneous injection    • levothyroxine 50 mcg tablet Take 50 mcg by mouth daily   • naloxone (NARCAN) 4 mg/0 1 mL nasal spray Administer 1 spray into a nostril  If no response after 2-3 minutes, give another dose in the other nostril using a new spray  • nitroglycerin (NITROLINGUAL) 0 4 mg/spray spray USE ONE SPRAY Q 5 MINUTES AS NEEDED FOR CHEST PAIN  IF NO RELIEF, CALL 911  • NovoLOG 100 UNIT/ML SOLN INFUSE UNDER THE SKIN VIA INSULIN PUMP AS DIRECTED  TOTAL DAILY DOSE  UNITS   • rosuvastatin (CRESTOR) 20 MG tablet Take 20 mg by mouth every evening     • traZODone (DESYREL) 50 mg tablet TAKE 0 5-1 TABLETS (25-50 MG TOTAL) BY MOUTH DAILY AT BEDTIME AS NEEDED FOR SLEEP   • enalapril (VASOTEC) 20 mg tablet Take 20 mg by mouth daily at bedtime   (Patient not taking: Reported on 12/9/2022)       Objective: There were no vitals taken for this visit       Physical Exam  Lashonda Grimm, DO

## 2023-02-20 NOTE — ASSESSMENT & PLAN NOTE
Suspected COVID-19 the patient does report to me increase of shortness of breath I did have her checked her pulse ox that was 90%, I she reports to me feeling very weak I did request the patient to be evaluated in the [address] [place] [address] ER ADT 21 completed patient will get a ride from a family member to the ER if she is unable to find someone to drive her she will call 911 to be transported to the ER   RTO after evaluation call if any change, or if her symptoms not juan

## 2023-02-20 NOTE — ED PROVIDER NOTES
History  Chief Complaint   Patient presents with   • COVID-19 Exposure      positive for covid 4 days ago  Patient reports being SOB and having a scratchy throat, dry cough     78-year-old female coming to the ED for evaluation of cough, sore throat, weakness for the past 3 days  Her  is sick with COVID-19 at this time, was just admitted in the hospital 2 days ago for weakness and dizziness  She states she has a very slight cough, denies shortness of breath, no chest pain  She has been urinating frequently and thirsty  She does have a history of type 2 diabetes, taking medications  She is currently living alone given that her  is in the hospital but she does have some support from her son  History provided by:  Patient   used: No        Prior to Admission Medications   Prescriptions Last Dose Informant Patient Reported? Taking? ALPRAZolam (XANAX) 0 5 mg tablet   No No   Sig: Take 1 tablet (0 5 mg total) by mouth 2 (two) times a day as needed for anxiety   B-D UF III MINI PEN NEEDLES 31G X 5 MM MISC   Yes No   BAQSIMI TWO PACK 3 MG/DOSE POWD   Yes No   Sig: Use as directed    BD Insulin Syringe U/F 31G X 5/16" 0 5 ML MISC   Yes No   Si (four) times a day As directed   Coenzyme Q10 (Co Q-10) 200 MG CAPS   Yes No   Sig: Take by mouth in the morning   DULoxetine (CYMBALTA) 60 mg delayed release capsule   No No   Sig: Take 1 capsule (60 mg total) by mouth daily   Insulin Disposable Pump (OmniPod Dash 5 Pack Pods) MISC   Yes No   Sig: USE 1 POD EVERY 1 5 DAYS   Insulin Disposable Pump (OmniPod Dash 5 Pack Pods) MISC   Yes No   Sig: Omnipod Dash Insulin Pod subcutaneous cartridge   USE 1 POD EVERY 1 5 DAYS   Lantus 100 UNIT/ML subcutaneous injection   Yes No   NovoLOG 100 UNIT/ML SOLN   Yes No   Sig: INFUSE UNDER THE SKIN VIA INSULIN PUMP AS DIRECTED   TOTAL DAILY DOSE  UNITS   aspirin 81 MG tablet   Yes No   Sig: Take 81 mg by mouth daily   cholecalciferol (VITAMIN D3) 1,000 units tablet   Yes No   Sig: Take 2,000 Units by mouth daily   diclofenac sodium (VOLTAREN) 1 %   No No   Sig: Apply 2 g topically 4 (four) times a day   Patient taking differently: Apply 2 g topically as needed   diltiazem (TIAZAC) 300 MG 24 hr capsule   Yes No   Sig: Take 300 mg by mouth daily   docusate sodium (COLACE) 100 mg capsule   No No   Sig: Take 1 capsule (100 mg total) by mouth 2 (two) times a day   Patient taking differently: Take 100 mg by mouth 2 (two) times a day as needed   enalapril (VASOTEC) 20 mg tablet   Yes No   Sig: Take 20 mg by mouth daily at bedtime     Patient not taking: Reported on 12/9/2022   insulin aspart (NovoLOG) 100 units/mL injection   No No   Sig: Inject 12 Units under the skin 3 (three) times a day before meals   Patient taking differently: Inject under the skin INSULIN PUMP   irbesartan (AVAPRO) 300 mg tablet   Yes No   Sig: Take 300 mg by mouth   isosorbide mononitrate (IMDUR) 60 mg 24 hr tablet   Yes No   Sig: TAKE 1 5 TABs AT NIGHT   levothyroxine 50 mcg tablet   Yes No   Sig: Take 50 mcg by mouth daily   naloxone (NARCAN) 4 mg/0 1 mL nasal spray   No No   Sig: Administer 1 spray into a nostril  If no response after 2-3 minutes, give another dose in the other nostril using a new spray  nitroglycerin (NITROLINGUAL) 0 4 mg/spray spray   Yes No   Sig: USE ONE SPRAY Q 5 MINUTES AS NEEDED FOR CHEST PAIN  IF NO RELIEF, CALL 911     rosuvastatin (CRESTOR) 20 MG tablet   Yes No   Sig: Take 20 mg by mouth every evening     traZODone (DESYREL) 50 mg tablet   No No   Sig: TAKE 0 5-1 TABLETS (25-50 MG TOTAL) BY MOUTH DAILY AT BEDTIME AS NEEDED FOR SLEEP      Facility-Administered Medications: None       Past Medical History:   Diagnosis Date   • Acute embolism and thrombosis of unspecified deep veins of unspecified lower extremity ([address] )     Last Assessed:  5/18/17   • Anemia    • Anosmia    • Anxiety    • Arthritis    • Asthma    • Back pain    • Bilateral macular retinal edema    • CAD (coronary artery disease)    • Cataract    • Cervical disc herniation    • Cervical radiculopathy    • Cervical spinal stenosis    • Cervical spondylolysis    • Chronic kidney disease    • Chronic mastoiditis    • Colon polyp    • Complex endometrial hyperplasia    • Depression    • Diabetes mellitus (HCC)    • Disease of thyroid gland    • DVT (deep venous thrombosis) (HCC)    • Fibromyalgia    • Fibromyalgia, primary    • Hyperlipidemia    • Hypertension    • Hypothyroid    • Kidney stone    • Lumbar radiculopathy    • Neck pain    • Obese    • PONV (postoperative nausea and vomiting)    • Shingles 07/01/2021   • Spinal stenosis    • Stomach ulcer    • Thyroid disease        Past Surgical History:   Procedure Laterality Date   • BACK SURGERY     • CARPAL TUNNEL RELEASE     • CATARACT EXTRACTION     • CHOLECYSTECTOMY     • COLONOSCOPY     • CORONARY ANGIOPLASTY     • CORONARY ARTERY BYPASS GRAFT     • CYSTOSCOPY N/A 6/20/2017    Procedure: CYSTOSCOPY;  Surgeon: Merced Reed MD;  Location: [facility];  Service: Gynecology Oncology   • CYSTOSCOPY     • AZ LAPS TOTAL HYSTERECT 250 GM/< W/RMVL TUBE/OVARY N/A 6/20/2017    Procedure: ROBOTIC HYSTERECTOMY; BILATERAL SALPINO-OOPHERECTOMY; umbilical hernia repair ;  Surgeon: Merced Reed MD;  Location: [facility];  Service: Gynecology Oncology   • AZ REPAIR FIRST ABDOMINAL WALL HERNIA N/A 5/12/2022    Procedure: REPAIR HERNIA INCISIONAL;  Surgeon: Rahat Charles DO;  Location: [facility];  Service: General   • TONSILECTOMY AND ADNOIDECTOMY     • TONSILLECTOMY     • UMBILICAL HERNIA REPAIR         Family History   Problem Relation Age of Onset   • Arthritis Mother    • Leukemia Mother    • Other Mother         Anxiety, major depressive disorder, recurrent episode with atypical features   • Coronary artery disease Father         Heart problem   • Diabetes Father    • Other Father         Infectious disease   • Alzheimer's disease Maternal Grandmother    • Other Maternal Grandfather         Heart problem   • Other Daughter         Anxiety, major depressive disorder, recurrent episode with atypical features   • Alcohol abuse Other         Grandparent   • Cancer Family    • Diabetes Family    • Hypertension Family    • Other Family         Reported prior back trouble, thyroid disorder     I have reviewed and agree with the history as documented  E-Cigarette/Vaping   • E-Cigarette Use Never User      E-Cigarette/Vaping Substances   • Nicotine No    • THC No    • CBD No    • Flavoring No    • Other No    • Unknown No      Social History     Tobacco Use   • Smoking status: Former     Packs/day: 1      Years: 6      Pack years: 6      Types: Cigarettes     Quit date:      Years since quittin 1   • Smokeless tobacco: Never   • Tobacco comments:     Smoked about a half a pack for about 4 yrs  Quite about 50 yrs ago  Vaping Use   • Vaping Use: Never used   Substance Use Topics   • Alcohol use: No   • Drug use: No       Review of Systems   Constitutional: Positive for fatigue  Respiratory: Positive for cough  Genitourinary: Positive for frequency  Neurological: Positive for weakness  All other systems reviewed and are negative  Physical Exam  Physical Exam  Vitals and nursing note reviewed  Constitutional:       General: She is not in acute distress  Appearance: Normal appearance  She is well-developed and normal weight  She is not ill-appearing, toxic-appearing or diaphoretic  HENT:      Head: Normocephalic and atraumatic  Right Ear: External ear normal       Left Ear: External ear normal       Nose: Nose normal       Mouth/Throat:      Mouth: Mucous membranes are moist       Pharynx: Oropharynx is clear  Eyes:      Conjunctiva/sclera: Conjunctivae normal    Cardiovascular:      Rate and Rhythm: Normal rate and regular rhythm  Pulses: Normal pulses  Heart sounds: Normal heart sounds     Pulmonary: Effort: Pulmonary effort is normal       Breath sounds: Normal breath sounds  Abdominal:      General: Abdomen is flat  Bowel sounds are normal  There is no distension or abdominal bruit  There are no signs of injury  Palpations: Abdomen is soft  There is no shifting dullness  Tenderness: There is no abdominal tenderness  Genitourinary:     Adnexa: Right adnexa normal and left adnexa normal    Musculoskeletal:         General: Normal range of motion  Cervical back: Normal range of motion and neck supple  Skin:     General: Skin is warm and dry  Capillary Refill: Capillary refill takes less than 2 seconds  Neurological:      General: No focal deficit present  Mental Status: She is alert and oriented to person, place, and time  Mental status is at baseline     Psychiatric:         Mood and Affect: Mood normal          Behavior: Behavior normal          Vital Signs  ED Triage Vitals [02/20/23 1039]   Temperature Pulse Respirations Blood Pressure SpO2   98 7 °F (37 1 °C) 102 17 166/72 98 %      Temp Source Heart Rate Source Patient Position - Orthostatic VS BP Location FiO2 (%)   Oral Monitor Lying Right arm --      Pain Score       4           Vitals:    02/20/23 1039 02/20/23 1145   BP: 166/72    Pulse: 102 81   Patient Position - Orthostatic VS: Lying          Visual Acuity      ED Medications  Medications - No data to display    Diagnostic Studies  Results Reviewed     Procedure Component Value Units Date/Time    Urine Microscopic [[id number]]  (Abnormal) Collected: 02/20/23 1234    Lab Status: Final result Specimen: Urine, Clean Catch Updated: 02/20/23 1255     RBC, UA 1-2 /hpf      WBC, UA 2-4 /hpf      Epithelial Cells Occasional /hpf      Bacteria, UA Occasional /hpf     UA w Reflex to Microscopic w Reflex to Culture [[id number]]  (Abnormal) Collected: 02/20/23 1234    Lab Status: Final result Specimen: Urine, Clean Catch Updated: 02/20/23 1243     Color, UA Light Yellow Clarity, UA Clear     Specific Gravity, UA 1 016     pH, UA 5 0     Leukocytes, UA Negative     Nitrite, UA Negative     Protein, UA 30 (1+) mg/dl      Glucose, UA Trace mg/dl      Ketones, UA Negative mg/dl      Urobilinogen, UA <2 0 mg/dl      Bilirubin, UA Negative     Occult Blood, UA Negative    FLU/RSV/COVID - if FLU/RSV clinically relevant [[id number]]  (Abnormal) Collected: 02/20/23 1125    Lab Status: Final result Specimen: Nares from Nose Updated: 02/20/23 1225     SARS-CoV-2 Positive     INFLUENZA A PCR Negative     INFLUENZA B PCR Negative     RSV PCR Negative    Narrative:      FOR PEDIATRIC PATIENTS - copy/paste COVID Guidelines URL to browser: [email or web address]  [employer]    SARS-CoV-2 assay is a Nucleic Acid Amplification assay intended for the  qualitative detection of nucleic acid from SARS-CoV-2 in nasopharyngeal  swabs  Results are for the presumptive identification of SARS-CoV-2 RNA  Positive results are indicative of infection with SARS-CoV-2, the virus  causing COVID-19, but do not rule out bacterial infection or co-infection  with other viruses  Laboratories within the [place] and its  territories are required to report all positive results to the appropriate  public health authorities  Negative results do not preclude SARS-CoV-2  infection and should not be used as the sole basis for treatment or other  patient management decisions  Negative results must be combined with  clinical observations, patient history, and epidemiological information  This test has not been FDA cleared or approved  This test has been authorized by FDA under an Emergency Use Authorization  (EUA)   This test is only authorized for the duration of time the  declaration that circumstances exist justifying the authorization of the  emergency use of an in vitro diagnostic tests for detection of SARS-CoV-2  virus and/or diagnosis of COVID-19 infection under section 564(b)(1) of  the Act, 21 U  S C  203NST-8(Y)(5), unless the authorization is terminated  or revoked sooner  The test has been validated but independent review by FDA  and CLIA is pending  Test performed using [employer] GeneXpert: This RT-PCR assay targets N2,  a region unique to SARS-CoV-2  A conserved region in the E-gene was chosen  for pan-Sarbecovirus detection which includes SARS-CoV-2  According to CMS-2020-01-R, this platform meets the definition of high-throughput technology  Fingerstick Glucose (POCT) [[id number]]  (Abnormal) Collected: 02/20/23 1153    Lab Status: Final result Updated: 02/20/23 1155     POC Glucose 244 mg/dl                  No orders to display              Procedures  Procedures         ED Course                               SBIRT 20yo+    Flowsheet Row Most Recent Value   SBIRT (23 yo +)    In order to provide better care to our patients, we are screening all of our patients for alcohol and drug use  Would it be okay to ask you these screening questions? Yes Filed at: 02/20/2023 1127   Initial Alcohol Screen: US AUDIT-C     1  How often do you have a drink containing alcohol? 0 Filed at: 02/20/2023 1127   2  How many drinks containing alcohol do you have on a typical day you are drinking? 0 Filed at: 02/20/2023 1127   3b  FEMALE Any Age, or MALE 65+: How often do you have 4 or more drinks on one occassion? 0 Filed at: 02/20/2023 1127   Audit-C Score 0 Filed at: 02/20/2023 1127   [place]: How many times in the past year have you    Used an illegal drug or used a prescription medication for non-medical reasons? Never Filed at: 02/20/2023 1127                    Medical Decision Making  61-year-old female with type 2 diabetes, CKD, coming into the ED for evaluation of slight cough, fatigue, in setting of her  recently being admitted to the hospital with COVID-19  She has not been tested at this point, has had symptoms for 3 days    She does complain of thirst and urinary frequency  Point-of-care blood glucose in the 200s, urinalysis does not show significant ketonuria, or glucosuria, 2-4 white blood cells only  She is positive for COVID-19  Physical exam is unremarkable, she is well-appearing, nontoxic  Given her advanced age and history of type 2 diabetes, she is high risk for progression of disease to pneumonia and to prevent severe disease, discussed with the patient using an antiviral   He is agreeable to this, prescribed Molprunavir, instead of Pavloxid due to CKD, contraindicated  RTER precautions  COVID-19: self-limited or minor problem  Amount and/or Complexity of Data Reviewed  Labs: ordered  Risk  Prescription drug management  Disposition  Final diagnoses:   [id number]     Time reflects when diagnosis was documented in both MDM as applicable and the Disposition within this note     Time User Action Codes Description Comment    2/20/2023  2:02 PM Charles Dumont Add [U07 1] COVID-19       ED Disposition     ED Disposition   Discharge    Condition   Stable    Date/Time   Mon Feb 20, 2023  2:02 PM    Lashanda Bravo discharge to home/self care                 Follow-up Information     Follow up With Specialties Details Why [address]  [facility], DO Internal Medicine   [address]  [phone]            Discharge Medication List as of 2/20/2023  2:05 PM      START taking these medications    Details   molnupiravir (Lagevrio) 200 mg capsule Take 4 capsules (800 mg total) by mouth every 12 (twelve) hours for 5 days, Starting Mon 2/20/2023, Until Sat 2/25/2023, Normal         CONTINUE these medications which have NOT CHANGED    Details   ALPRAZolam (XANAX) 0 5 mg tablet Take 1 tablet (0 5 mg total) by mouth 2 (two) times a day as needed for anxiety, Starting Mon 1/30/2023, Normal      aspirin 81 MG tablet Take 81 mg by mouth daily, Historical Med      B-D UF III MINI PEN NEEDLES 31G X 5 MM MISC Starting Fri 6/5/2020, Historical Med      BAQSIMI TWO PACK 3 MG/DOSE POWD Use as directed , Starting Tue 10/8/2019, Historical Med      BD Insulin Syringe U/F 31G X 5/16" 0 5 ML MISC 4 (four) times a day As directed, Starting Mon 8/22/2022, Historical Med      cholecalciferol (VITAMIN D3) 1,000 units tablet Take 2,000 Units by mouth daily, Historical Med      Coenzyme Q10 (Co Q-10) 200 MG CAPS Take by mouth in the morning, Historical Med      diclofenac sodium (VOLTAREN) 1 % Apply 2 g topically 4 (four) times a day, Starting Tue 1/15/2019, Normal      diltiazem (TIAZAC) 300 MG 24 hr capsule Take 300 mg by mouth daily, Historical Med      docusate sodium (COLACE) 100 mg capsule Take 1 capsule (100 mg total) by mouth 2 (two) times a day, Starting Tue 4/20/2021, Normal      DULoxetine (CYMBALTA) 60 mg delayed release capsule Take 1 capsule (60 mg total) by mouth daily, Starting Fri 10/14/2022, Normal      enalapril (VASOTEC) 20 mg tablet Take 20 mg by mouth daily at bedtime  , Historical Med      insulin aspart (NovoLOG) 100 units/mL injection Inject 12 Units under the skin 3 (three) times a day before meals, Starting u 6/22/2017, No Print      !! Insulin Disposable Pump (OmniPod Dash 5 Pack Pods) MISC USE 1 POD EVERY 1 5 DAYS, Historical Med      !! Insulin Disposable Pump (OmniPod Dash 5 Pack Pods) MISC Omnipod Dash Insulin Pod subcutaneous cartridge   USE 1 POD EVERY 1 5 DAYS, Historical Med      irbesartan (AVAPRO) 300 mg tablet Take 300 mg by mouth, Starting Mon 10/10/2022, Until Tue 10/10/2023 at 2359, Historical Med      isosorbide mononitrate (IMDUR) 60 mg 24 hr tablet TAKE 1 5 TABs AT NIGHT, Historical Med      Lantus 100 UNIT/ML subcutaneous injection Starting Wed 10/5/2022, Historical Med      levothyroxine 50 mcg tablet Take 50 mcg by mouth daily, Historical Med      naloxone (NARCAN) 4 mg/0 1 mL nasal spray Administer 1 spray into a nostril   If no response after 2-3 minutes, give another dose in the other nostril using a new spray , Normal      nitroglycerin (NITROLINGUAL) 0 4 mg/spray spray USE ONE SPRAY Q 5 MINUTES AS NEEDED FOR CHEST PAIN  IF NO RELIEF, CALL 911 , Historical Med      NovoLOG 100 UNIT/ML SOLN INFUSE UNDER THE SKIN VIA INSULIN PUMP AS DIRECTED  TOTAL DAILY DOSE  UNITS, Historical Med      rosuvastatin (CRESTOR) 20 MG tablet Take 20 mg by mouth every evening  , Starting Tue 12/4/2018, Historical Med      traZODone (DESYREL) 50 mg tablet TAKE 0 5-1 TABLETS (25-50 MG TOTAL) BY MOUTH DAILY AT BEDTIME AS NEEDED FOR SLEEP, Starting Sat 12/18/2021, Normal       !! - Potential duplicate medications found  Please discuss with provider  No discharge procedures on file      PDMP Review       Value Time User    PDMP Reviewed  Yes 1/30/2023  1:56 PM Monae Medina DO          ED Provider  Electronically Signed by           Letitia Jasso DO  02/20/23 3289

## 2023-02-20 NOTE — ED NOTES
Per provider son is aware of discharge and "Will be right over "      Kwasi Velazquez, GAMA  02/20/23 9587

## 2023-02-21 ENCOUNTER — TELEPHONE (OUTPATIENT)
Dept: OTHER | Facility: OTHER | Age: 78
End: 2023-02-21

## 2023-02-21 NOTE — TELEPHONE ENCOUNTER
Dr Ashley Boyd advised patient to go to the ER where they gave her molnupiravir  But today she is feeling worse  Please call back to advise

## 2023-02-22 ENCOUNTER — APPOINTMENT (OUTPATIENT)
Dept: PHYSICAL THERAPY | Facility: CLINIC | Age: 78
End: 2023-02-22

## 2023-02-23 ENCOUNTER — TELEMEDICINE (OUTPATIENT)
Dept: INTERNAL MEDICINE CLINIC | Facility: CLINIC | Age: 78
End: 2023-02-23

## 2023-02-23 DIAGNOSIS — U07.1 COVID-19: Primary | ICD-10-CM

## 2023-02-23 RX ORDER — GUAIFENESIN 600 MG/1
1200 TABLET, EXTENDED RELEASE ORAL EVERY 12 HOURS SCHEDULED
Qty: 30 TABLET | Refills: 0
Start: 2023-02-23

## 2023-02-23 RX ORDER — FLUTICASONE PROPIONATE 50 MCG
1 SPRAY, SUSPENSION (ML) NASAL DAILY
Qty: 9.9 ML | Refills: 0
Start: 2023-02-23

## 2023-02-23 NOTE — TELEPHONE ENCOUNTER
Virtual appointment today with Dr Sajan Dueñas at 130 pm     LM on AM for patient and advised she Cb if she is unable to do that

## 2023-02-23 NOTE — PATIENT INSTRUCTIONS
Please take Vitamin C 1000 mg twice a day, Vitamin  D 2000 U daily and zinc 200 mg once a day  Please take Mucinex twice a day  for cough  Please use flonase daily , 1 spray in each nostril for nasal congestion

## 2023-02-23 NOTE — PROGRESS NOTES
COVID-19 Outpatient Progress Note    Assessment/Plan:    Problem List Items Addressed This Visit        Other    COVID-19 - Primary    Relevant Medications    guaiFENesin (MUCINEX) 600 mg 12 hr tablet    fluticasone (FLONASE) 50 mcg/act nasal spray      Disposition:     Risks and benefits of COVID-19 vaccination was discussed with patient  Discussed symptom directed medication options with patient  Discussed vitamin D, vitamin C, and/or zinc supplementation with patient  Patient meets criteria for Molnupiravir and they have been counseled according to EUA documentation provided by the FDA  After discussion, patient agrees to treatment  Alvina Radon is an investigational medicine used to treat mild-to-moderate COVID-19 in adults with positive results of direct SARS-CoV-2 viral testing, and who are at high risk for progressing to severe COVID-19 including hospitalization or death, and for whom other COVID-19 treatment options authorized by the FDA are not accessible or clinically appropriate  The FDA has authorized the emergency use of molnupiravir for the treatment of mild-tomoderate COVID-19 in adults under an EUA  Alvina Radon is not authorized:  - for use in people less than 25years of age   - for prevention of COVID-19   - for people needing hospitalization for COVID-19   - for use for longer than 5 consecutive days    What is the most important information I should know about molnupiravir? Alvina Radon may cause serious side effects, including:    Alvina Radon may cause harm to your unborn baby  It is not known if molnupiravir will harm your baby if you take molnupiravir during pregnancy  - Alvina Radon is not recommended for use in pregnancy  - Alvina Radon has not been studied in pregnancy  Alvina Radon was studied in pregnant animals only   When molnupiravir was given to pregnant animals, molnupiravir caused harm to their unborn babies   - You and your healthcare provider may decide that you should take molnupiravir duringpregnancy if there are no other COVID-19 treatment options authorized by the FDA that are accessible or clinically appropriate for you  - If you and your healthcare provider decide that you should take molnupiravir during pregnancy, you and your healthcare provider should discuss the known and potential benefits and the potential risks of taking molnupiravir during pregnancy  For individuals who are able to become pregnant:  - You should use a reliable method of birth control (contraception) consistently and correctly during treatment with molnupiravir and for 4 days after the last dose of molnupiravir  Talk to your healthcare provider about reliable birth control methods  - Before starting treatment with molnupiravir your healthcare provider may do a pregnancy test to see if you are pregnant before starting treatment with molnupiravir  - Tell your healthcare provider right away if you become pregnant or think you may be pregnant during treatment with molnupiravir  Pregnancy Surveillance Program:  - There is a pregnancy surveillance program for individuals who take molnupiravir during pregnancy  The purpose of this program is to collect information about the health of you and your baby  Talk to your healthcare provider about how to take part in this program   - If you take molnupiravir during pregnancy and you agree to participate in the pregnancy surveillance program and allow your healthcare provider to share your information with Leandra Hylton, then your healthcare provider will report your use of molnupiravir during pregnancy to [address] [place] [address],[address]  by calling [phone] or pregnancyreporting msd com  For individuals who are sexually active with partners who are able to become pregnant:  - It is not known if molnupiravir can affect sperm   While the risk is regarded as low, animal studies to fully assess the potential for molnupiravir to affect the babies of males treated with molnupiravir have not been completed  A reliable method of birth control (contraception) should be used consistently and correctly during treatment with molnupiravir and for at least 3 months after the last dose  The risk to sperm beyond 3 months is not known  Studies to understand the risk to sperm beyond 3 months are ongoing  Talk to your healthcare provider about reliable birth control methods  Talk to your healthcare provider if you have questions or concerns about how molnupiravir may affect sperm  How do I take molnupiravir? - Take molnupiravir exactly as your healthcare provider tells you to take it  - Take 4 capsules of molnupiravir every 12 hours (for example, at 8 am and at 8 pm)  - Take molnupiravir for 5 days  It is important that you complete the full 5 days of treatment with molnupiravir  Do not stop taking molnupiravir before you complete the full 5 days of treatment, even if you feel better  - Take molnupiravir with or without food  - You should stay in isolation for as long as your healthcare provider tells you to  Talk to your healthcare provider if you are not sure about how to properly isolate while you have COVID-19   - Swallow molnupiravir capsules whole  Do not open, break, or crush the capsules  If you cannot swallow capsules whole, tell your healthcare provider  What to do if you miss a dose:  - If it has been less than 10 hours since the missed dose, take it as soon as you remember  - If it has been more than 10 hours since the missed dose, skip the missed dose and take your dose at the next scheduled time  - Do not double the dose of molnupiravir to make up for a missed dose  What are the important possible side effects of molnupiravir?     Possible side effects of molnupiravir are:  - See, Bahman Jennings is the most important information I should know about molnupiravir?”  - Diarrhea  - Nausea  - Dizziness    These are not all the possible side effects of molnupiravir  Not many people have taken molnupiravir  Serious and unexpected side effects may happen  This medicine is still being studied, so it is possible that all of the risks are not known at this time  What other treatment choices are there? Like Pravin Ocampo may allow for the emergency use of other medicines to treat people with COVID-19  Go to https://[employer]/ for more information  It is your choice to be treated or not to be treated with molnupiravir  Should you decide not to take it, it will not change your standard medical care  What if I am breastfeeding? Breastfeeding is not recommended during treatment with molnupiravir and for 4 days after the last dose of molnupiravir  If you are breastfeeding or plan to breastfeed, talk to your healthcare provider about your options and specific situation before taking molnupiravir  How do I report side effects with molnupiravir? Contact your healthcare provider if you have any side effects that bother you or do not go away  Report side effects to FDA MedWatch at www [employer] gov/med[employer]idyard or call 9-629-QRX-6940 (1-437.236.1442)  How should I store Kim Lard? - Store molnupiravir capsules at room temperature between 68°F to 77°F (20°C to 25°C)  - Keep molnupiravir and all medicines out of the reach of children and pets  Full fact sheet for patients, parents, and caregivers can be found at: [employer] au    I have spent 30 minutes directly with the patient  Greater than 50% of this time was spent in counseling/coordination of care regarding: diagnostic results and prognosis         Encounter provider: Marni Pascual MD     Provider located at: [address] [address] [address]     Recent Visits  Date Type Provider Dept   [facility] [address] recent visits within past 7 days and meeting all other requirements  Today's Visits  Date Type Provider Dept   02/23/23 Telemedicine MD [address] today's visits and meeting all other requirements  Future Appointments  No visits were found meeting these conditions  Showing future appointments within next 150 days and meeting all other requirements     This virtual check-in was done via telephone and she agrees to proceed  Patient agrees to participate in a virtual check in via telephone or video visit instead of presenting to the office to address urgent/immediate medical needs  Patient is aware this is a billable service  She acknowledged consent and understanding of privacy and security of the video platform  The patient has agreed to participate and understands they can discontinue the visit at any time  After connecting through Telephone, the patient was identified by name and date of birth  Junaid Santo was informed that this was a telemedicine visit and that the exam was being conducted confidentially over secure lines  My office door was closed  No one else was in the room  Junaid Santo acknowledged consent and understanding of privacy and security of the telemedicine visit  I informed the patient that I have reviewed her record in Epic and presented the opportunity for her to ask any questions regarding the visit today  The patient agreed to participate  It was my intent to perform this visit via video technology but the patient was not able to do a video connection so the visit was completed via audio telephone only  Verification of patient location:  Patient is located in the following state in which I hold an active license: [place]    Subjective: Junaid Santo is a 69 y o  female who is concerned about COVID-19   Patient's symptoms include nasal congestion, sore throat, anosmia, loss of taste, cough, diarrhea and headache (intermittent)  Patient denies fever, chills, fatigue, malaise, rhinorrhea, shortness of breath, chest tightness, abdominal pain, nausea, vomiting and myalgias  COVID-19 vaccination status: Fully vaccinated with booster    Exposure:   Contact with a person who is under investigation (PUI) for or who is positive for COVID-19 within the last 14 days?: Yes    Hospitalized recently for fever and/or lower respiratory symptoms?: No      Currently a healthcare worker that is involved in direct patient care?: No      Works in a special setting where the risk of COVID-19 transmission may be high? (this may include long-term care, correctional and custodial facilities; homeless shelters; assisted-living facilities and group homes ): No      Resident in a special setting where the risk of COVID-19 transmission may be high? (this may include long-term care, correctional and custodial facilities; homeless shelters; assisted-living facilities and group homes ): No      Patient was at [facility] on 2/20/23 and was tested positive for Covid 19 infection  She was started on Molnupiravir 800 mg po q12h x 5 days  She states that she is improving now on the medication  She takes vitamin D daily  Lab Results   Component Value Date    SARSCOV2 Positive (A) 02/20/2023       Review of Systems   Constitutional: Negative for activity change, appetite change, chills, fatigue, fever and unexpected weight change  HENT: Positive for congestion and sore throat  Negative for rhinorrhea  Eyes: Negative for visual disturbance  Respiratory: Positive for cough  Negative for chest tightness and shortness of breath  Cardiovascular: Negative for chest pain and palpitations  Gastrointestinal: Positive for diarrhea  Negative for abdominal pain, constipation, nausea and vomiting  Genitourinary: Negative for difficulty urinating     Musculoskeletal: Negative for arthralgias, joint swelling, myalgias and neck pain  Neurological: Positive for headaches (intermittent)  Negative for dizziness, weakness and numbness  Hematological: Does not bruise/bleed easily  Psychiatric/Behavioral: Negative for agitation, behavioral problems and confusion       Current Outpatient Medications on File Prior to Visit   Medication Sig   • ALPRAZolam (XANAX) 0 5 mg tablet Take 1 tablet (0 5 mg total) by mouth 2 (two) times a day as needed for anxiety   • aspirin 81 MG tablet Take 81 mg by mouth daily   • B-D UF III MINI PEN NEEDLES 31G X 5 MM MISC    • BAQSIMI TWO PACK 3 MG/DOSE POWD Use as directed    • BD Insulin Syringe U/F 31G X 5/16" 0 5 ML MISC 4 (four) times a day As directed   • cholecalciferol (VITAMIN D3) 1,000 units tablet Take 2,000 Units by mouth daily   • Coenzyme Q10 (Co Q-10) 200 MG CAPS Take by mouth in the morning   • diclofenac sodium (VOLTAREN) 1 % Apply 2 g topically 4 (four) times a day (Patient taking differently: Apply 2 g topically as needed)   • diltiazem (TIAZAC) 300 MG 24 hr capsule Take 300 mg by mouth daily   • docusate sodium (COLACE) 100 mg capsule Take 1 capsule (100 mg total) by mouth 2 (two) times a day (Patient taking differently: Take 100 mg by mouth 2 (two) times a day as needed)   • DULoxetine (CYMBALTA) 60 mg delayed release capsule Take 1 capsule (60 mg total) by mouth daily   • insulin aspart (NovoLOG) 100 units/mL injection Inject 12 Units under the skin 3 (three) times a day before meals (Patient taking differently: Inject under the skin INSULIN PUMP)   • Insulin Disposable Pump (OmniPod Dash 5 Pack Pods) MISC USE 1 POD EVERY 1 5 DAYS   • Insulin Disposable Pump (OmniPod Dash 5 Pack Pods) MISC Omnipod Dash Insulin Pod subcutaneous cartridge   USE 1 POD EVERY 1 5 DAYS   • irbesartan (AVAPRO) 300 mg tablet Take 300 mg by mouth   • isosorbide mononitrate (IMDUR) 60 mg 24 hr tablet TAKE 1 5 TABs AT NIGHT   • Lantus 100 UNIT/ML subcutaneous injection    • levothyroxine 50 mcg tablet Take 50 mcg by mouth daily   • molnupiravir (Lagevrio) 200 mg capsule Take 4 capsules (800 mg total) by mouth every 12 (twelve) hours for 5 days   • naloxone (NARCAN) 4 mg/0 1 mL nasal spray Administer 1 spray into a nostril  If no response after 2-3 minutes, give another dose in the other nostril using a new spray  • nitroglycerin (NITROLINGUAL) 0 4 mg/spray spray USE ONE SPRAY Q 5 MINUTES AS NEEDED FOR CHEST PAIN  IF NO RELIEF, CALL 911  • NovoLOG 100 UNIT/ML SOLN INFUSE UNDER THE SKIN VIA INSULIN PUMP AS DIRECTED  TOTAL DAILY DOSE  UNITS   • rosuvastatin (CRESTOR) 20 MG tablet Take 20 mg by mouth every evening     • traZODone (DESYREL) 50 mg tablet TAKE 0 5-1 TABLETS (25-50 MG TOTAL) BY MOUTH DAILY AT BEDTIME AS NEEDED FOR SLEEP   • enalapril (VASOTEC) 20 mg tablet Take 20 mg by mouth daily at bedtime   (Patient not taking: Reported on 12/9/2022)       Objective: There were no vitals taken for this visit  Physical Exam  Constitutional:       Appearance: She is obese  She is not ill-appearing  Pulmonary:      Effort: Pulmonary effort is normal       Comments: No exertional dyspnea  Neurological:      Mental Status: She is alert and oriented to person, place, and time  Psychiatric:         Mood and Affect: Mood normal          Behavior: Behavior normal          Thought Content:  Thought content normal          Judgment: Judgment normal        Nutrition Assessment and Intervention:     Reviewed food recall journal      Physical Activity Assessment and Intervention:    Activity journal reviewed    Activity journal completion recommended      Emotional and Mental Well-being, Sleep, Connectedness Assessment and Intervention:    Sleep/stress assessment performed    Depression and anxiety screening performed and reviewed      Tobacco and Toxic Substance Assessment and Intervention:     Tobacco use screening performed    Alcohol and drug use screening performed Naida Salmeron MD

## 2023-02-24 DIAGNOSIS — F41.1 GAD (GENERALIZED ANXIETY DISORDER): ICD-10-CM

## 2023-02-24 DIAGNOSIS — F41.9 ANXIETY: ICD-10-CM

## 2023-02-24 DIAGNOSIS — F41.0 PANIC ATTACKS: ICD-10-CM

## 2023-02-24 NOTE — TELEPHONE ENCOUNTER
Medication Refill Request     Name Alprazolam    Dose/Frequency 0 5mg BID PRN  Quantity 30  Verified pharmacy   [x]  Verified ordering Provider   [x]  Does patient have enough for the next 3 days?  Yes [x] No []

## 2023-02-27 ENCOUNTER — APPOINTMENT (OUTPATIENT)
Dept: PHYSICAL THERAPY | Facility: CLINIC | Age: 78
End: 2023-02-27

## 2023-02-27 RX ORDER — ALPRAZOLAM 0.5 MG/1
0.5 TABLET ORAL 2 TIMES DAILY PRN
Qty: 60 TABLET | Refills: 0 | Status: SHIPPED | OUTPATIENT
Start: 2023-02-27

## 2023-03-07 ENCOUNTER — SOCIAL WORK (OUTPATIENT)
Dept: BEHAVIORAL/MENTAL HEALTH CLINIC | Facility: CLINIC | Age: 78
End: 2023-03-07

## 2023-03-07 DIAGNOSIS — F41.9 ANXIETY: Primary | ICD-10-CM

## 2023-03-07 NOTE — PSYCH
Behavioral Health Psychotherapy Progress Note    Psychotherapy Provided: Individual Psychotherapy     1  Anxiety            Goals addressed in session: Goal 1     DATA: Pablo De Anda has been dealing with a consistent level of higher stress and anxiety daily  Continues to have issues with her home and this has created more financial stressors  She and her  recovering from Covid  Pablo De Anda continues to struggle to ambulate and maintain any activity due to pain and back issues  Her primary stressors remains her  and his "miserable" attitude  Not supportive or helpful at home despite her struggles  Have been long-standing issues  Not seeing her grandchildren as much as she used to contributes  Combination of stressors affects her mood and has felt depressed at times No SI  During this session, this clinician used the following therapeutic modalities: Solution-Focused Therapy and Supportive Psychotherapy    Substance Abuse was not addressed during this session  If the client is diagnosed with a co-occurring substance use disorder, please indicate any changes in the frequency or amount of use: none  Stage of change for addressing substance use diagnoses: No substance use/Not applicable    ASSESSMENT:  Mely Shah presents with a Anxious and Depressed mood  her affect is flat, which is congruent, with her mood and the content of the session  The client has not made progress on their goals  Mely Shah presents with a minimal risk of suicide, minimal risk of self-harm, and minimal risk of harm to others  For any risk assessment that surpasses a "low" rating, a safety plan must be developed  A safety plan was indicated: no  If yes, describe in detail N/A    PLAN: Between sessions, Mely Shah will increase utilization of social and recreational activities to offset her stress at home  Reviewed boundaries and expectations to maintain with  to limit stress  At the next session, the therapist will use Solution-Focused Therapy and Supportive Psychotherapy to address anxiety and depression  Behavioral Health Treatment Plan and Discharge Planning: Sangeetha Gill is aware of and agrees to continue to work on their treatment plan  They have identified and are working toward their discharge goals   yes    Visit start and stop times: 1:30-2:30    03/07/23

## 2023-03-08 ENCOUNTER — OFFICE VISIT (OUTPATIENT)
Dept: PHYSICAL THERAPY | Facility: CLINIC | Age: 78
End: 2023-03-08

## 2023-03-08 DIAGNOSIS — G89.29 CHRONIC BILATERAL LOW BACK PAIN WITHOUT SCIATICA: ICD-10-CM

## 2023-03-08 DIAGNOSIS — M54.50 CHRONIC BILATERAL LOW BACK PAIN WITHOUT SCIATICA: ICD-10-CM

## 2023-03-08 DIAGNOSIS — M25.561 CHRONIC PAIN OF BOTH KNEES: ICD-10-CM

## 2023-03-08 DIAGNOSIS — R26.89 ABNORMALITY OF GAIT DUE TO IMPAIRMENT OF BALANCE: ICD-10-CM

## 2023-03-08 DIAGNOSIS — M25.562 CHRONIC PAIN OF BOTH KNEES: ICD-10-CM
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
## 2023-03-08 DIAGNOSIS — G89.29 CHRONIC PAIN OF BOTH KNEES: ICD-10-CM

## 2023-03-08 NOTE — PROGRESS NOTES
Daily Note     Today's date: 3/8/2023  Patient name: Xochitl Wylie  :   MRN: [id number]  Referring provider: Elva Mann DO  Dx:   Encounter Diagnosis     ICD-10-CM    1  Fall, initial encounter  [facility] [address]  XXXA       2  Abnormality of gait due to impairment of balance  R26 89       3  Chronic bilateral low back pain without sciatica  M54 50     G89 29       4  Chronic pain of both knees  M25 561     M25 562     G89 29           Start Time: 1445  Stop Time: 1525  Total time in clinic (min): 40 minutes    Subjective: Patient states her back is feeling good today  She states she has more pain in the right knee today  Objective: See treatment diary below      Assessment: Initiated outlined program  Patient was able to tolerate gentle ROM exercises performed this visit  She does require increase time for positional changes  Patient cued for TA activation, but notes muscle cramping in her HS  She will continue to attempt this while at home  Patient has no complaints of pain throughout exercises  Will progress as she is able to tolerate  Plan: Progress treatment as tolerated  Diagnosis: Diego knee pain, LBP, balance   Precautions: complex PMH   Primary Goals: 1) lumbar and knee ROM  2) core, hip, knee strength  3) balance   *asterisks by exercise = given for HEP   Manuals  3      PROM knees        Tibfem distraction                                There Ex        Bike?         FF in chair  10x       3 way ball rolls  10x ea way       LTR  5 sec x10 ea       Seated HS S  10 x10 sec       Heel slides  5 sec x10 diego       Standing calf S                        Neuro Re-Ed        TA  10x       Kegel  10x       Clam        Standing 3 way SLR        Biodex        Tandem stance        SLS        Gait training with obstacles        Standing on foam                        Pain neuroscience education         Re-evaluation              Ther Act             Pt education    KLS, PTA Modalities             Heat

## 2023-03-16 ENCOUNTER — OFFICE VISIT (OUTPATIENT)
Dept: PHYSICAL THERAPY | Facility: CLINIC | Age: 78
End: 2023-03-16

## 2023-03-16 DIAGNOSIS — M25.562 CHRONIC PAIN OF BOTH KNEES: ICD-10-CM
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
## 2023-03-16 DIAGNOSIS — G89.29 CHRONIC BILATERAL LOW BACK PAIN WITHOUT SCIATICA: ICD-10-CM

## 2023-03-16 DIAGNOSIS — R26.89 ABNORMALITY OF GAIT DUE TO IMPAIRMENT OF BALANCE: ICD-10-CM

## 2023-03-16 DIAGNOSIS — M54.50 CHRONIC BILATERAL LOW BACK PAIN WITHOUT SCIATICA: ICD-10-CM

## 2023-03-16 DIAGNOSIS — G89.29 CHRONIC PAIN OF BOTH KNEES: ICD-10-CM

## 2023-03-16 DIAGNOSIS — M25.561 CHRONIC PAIN OF BOTH KNEES: ICD-10-CM

## 2023-03-16 NOTE — PROGRESS NOTES
Daily Note     Today's date: 3/16/2023  Patient name: Jessica Balbuena  :   MRN: [id number]  Referring provider: Mya Abernathy DO  Dx:   Encounter Diagnosis     ICD-10-CM    1  Fall, initial encounter  [address] [facility] [address]  XXXA       2  Abnormality of gait due to impairment of balance  R26 89       3  Chronic bilateral low back pain without sciatica  M54 50     G89 29       4  Chronic pain of both knees  M25 561     M25 562     G89 29           Start Time: 1400  Stop Time: 1445  Total time in clinic (min): 45 minutes    Subjective: Patient reports her knees are the problem today, and she is having a hard time getting around  Objective: See treatment diary below    Assessment: Patient tolerated gentle stretching for back and knee well  She continues to be limited with ROM and strength in both regions but is making slow progress at this time  Patient requires cuing for counting reps and may benefit from switching to timing exercises instead  Plan to progress to balance NV  Plan: Continue per plan of care  Diagnosis: Diego knee pain, LBP, balance   Precautions: complex PMH   Primary Goals: 1) lumbar and knee ROM  2) core, hip, knee strength  3) balance   *asterisks by exercise = given for HEP   Manuals 2/16 3/8 3/16     PROM knees        Tibfem distraction                                There Ex        Bike?         FF in chair*  10x  HEP     3 way ball rolls  10x ea way  10x ea way     LTR  5 sec x10 ea  5 sec x 10 ea     Seated HS S*  10 x10 sec  10x10 sec diego     Heel slides*  5 sec x10 diego  5 sec x 20 diego     Standing calf S   10x10 sec diego standing                     Neuro Re-Ed        TA*  10x  hold     Kegel*  10x  hold     SLR   2x10 diego     Clam   Supine YTB around knees alt 2x10     Standing 3 way SLR        Biodex        Tandem stance        SLS        Gait training with obstacles        Standing on foam                        Pain neuroscience education         Re-evaluation              Ther Act             Pt education    KLS, PTA                        Modalities             Heat

## 2023-03-23 ENCOUNTER — OFFICE VISIT (OUTPATIENT)
Dept: PHYSICAL THERAPY | Facility: CLINIC | Age: 78
End: 2023-03-23
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
## 2023-03-23 DIAGNOSIS — M25.561 CHRONIC PAIN OF BOTH KNEES: ICD-10-CM

## 2023-03-23 DIAGNOSIS — G89.29 CHRONIC BILATERAL LOW BACK PAIN WITHOUT SCIATICA: ICD-10-CM

## 2023-03-23 DIAGNOSIS — R26.89 ABNORMALITY OF GAIT DUE TO IMPAIRMENT OF BALANCE: ICD-10-CM

## 2023-03-23 DIAGNOSIS — M25.562 CHRONIC PAIN OF BOTH KNEES: ICD-10-CM

## 2023-03-23 DIAGNOSIS — M54.50 CHRONIC BILATERAL LOW BACK PAIN WITHOUT SCIATICA: ICD-10-CM

## 2023-03-23 DIAGNOSIS — G89.29 CHRONIC PAIN OF BOTH KNEES: ICD-10-CM

## 2023-03-23 NOTE — PROGRESS NOTES
Daily Note     Today's date: 3/23/2023  Patient name: Kiera Burton  :   MRN: [id number]  Referring provider: Modesta Lin DO  Dx:   Encounter Diagnosis     ICD-10-CM    1  Fall, initial encounter  [address] [facility] [address]  XXXA       2  Abnormality of gait due to impairment of balance  R26 89       3  Chronic bilateral low back pain without sciatica  M54 50     G89 29       4  Chronic pain of both knees  M25 561     M25 562     G89 29           Start Time: 1406  Stop Time: 1450  Total time in clinic (min): 44 minutes    Subjective: Patient states she is stiff and achy today  Objective: See treatment diary below      Assessment: Continued with outlined program  Patient tolerated rocking on RBC to begin session for blood flow prior to exercises  She was able to perform gentle stretching without complaints of increase pain  She did require several attempts to stand from standard chair and would benefit from sit to stands possibly with foam next visit to improve functional transfers  She was able to perform table exercises with increase time  She has no exacerbating pain symptoms throughout session  **Patient arrived late, still accommodated  Plan: Progress treatment as tolerated         Diagnosis: Diego knee pain, LBP, balance   Precautions: complex PMH   Primary Goals: 1) lumbar and knee ROM  2) core, hip, knee strength  3) balance   *asterisks by exercise = given for HEP   Manuals 2/16 3/8 3/16 3/23    PROM knees        Tibfem distraction                                There Ex        Bike?    5 min     FF in chair*  10x  HEP     3 way ball rolls  10x ea way  10x ea way 10x ea way    LTR  5 sec x10 ea  5 sec x 10 ea 5 sec x 10    Seated HS S*  10 x10 sec  10x10 sec diego 10x10 sec    Heel slides*  5 sec x10 diego  5 sec x 20 diego 5 sec x 20    Standing calf S   10x10 sec diego standing                     Neuro Re-Ed        TA*  10x  hold     Kegel*  10x  hold     SLR   2x10 diego     Clam   Supine YTB around knees alt 2x10 Supine YTB around knees alt 2x10    Standing 3 way SLR        Biodex        Tandem stance        SLS        Gait training with obstacles        Standing on foam                        Pain neuroscience education         Re-evaluation              Ther Act             Pt education    KLS, PTA           sit to stands with blue foam        NV     Modalities             Heat                                         This patient was treated by CAMDEN Lucas under the direct supervision of Lalito Lee PTA

## 2023-03-23 NOTE — PROGRESS NOTES
Daily Note     Today's date: 3/23/2023  Patient name: Maren Gu  :   MRN: [id number]  Referring provider: Georgie Lamar DO  Dx:   Encounter Diagnosis     ICD-10-CM    1  Fall, initial encounter  [address] [facility] [address]  XXXA       2  Abnormality of gait due to impairment of balance  R26 89       3  Chronic bilateral low back pain without sciatica  M54 50     G89 29       4  Chronic pain of both knees  M25 561     M25 562     G89 29           Start Time: 1406  Stop Time: 1450  Total time in clinic (min): 44 minutes    Subjective: Patient stated that she feels stiff and achy  Objective: See treatment diary below      Assessment: Continued with outlined program  Patient has difficulty getting up from chairs  The patient required 4 attempts to fully stand up  The patient still requires cues to count reps however she is improving  The patient is slowly progressing and will continue to benefit from LE strengthening to help mobility and ADLs  Consider adding in sit to stands next visit with blue foam to focus on strengthening quads, core and weight bearing due to shown difficulty this visit  ***Patient arrived late still accommodated  Plan: Progress treatment as tolerated         Diagnosis: Diego knee pain, LBP, balance   Precautions: complex PMH   Primary Goals: 1) lumbar and knee ROM  2) core, hip, knee strength  3) balance   *asterisks by exercise = given for HEP   Manuals 2/16 3/8 3/16 3/23    PROM knees        Tibfem distraction                                There Ex        Bike?    5 mins    FF in chair*  10x  HEP     3 way ball rolls  10x ea way  10x ea way 10 x each way     LTR  5 sec x10 ea  5 sec x 10 ea 5 sec x 10 ea    Seated HS S*  10 x10 sec  10x10 sec diego 10x10 sec diego    Heel slides*  5 sec x10 diego  5 sec x 20 diego 5 sec x 20 diego     Standing calf S   10x10 sec diego standing                     Neuro Re-Ed        TA*  10x  hold     Kegel*  10x  hold     SLR   2x10 diego     Clam   Supine YTB around knees alt 2x10 Supine YTB around knees alt 2x10    Standing 3 way SLR        Biodex        Tandem stance        SLS        Gait training with obstacles        Standing on foam                        Pain neuroscience education         Re-evaluation              Ther Act             Pt education    ALINE PTA           sit to stands with blue foam         NV     Modalities             Heat                                           This patient was treated by CAMDEN Estrada under the direct supervision of Deshawn Andersen PTA

## 2023-03-29 ENCOUNTER — OFFICE VISIT (OUTPATIENT)
Dept: PHYSICAL THERAPY | Facility: CLINIC | Age: 78
End: 2023-03-29

## 2023-03-29 DIAGNOSIS — G89.29 CHRONIC BILATERAL LOW BACK PAIN WITHOUT SCIATICA: ICD-10-CM

## 2023-03-29 DIAGNOSIS — M25.562 CHRONIC PAIN OF BOTH KNEES: ICD-10-CM
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
## 2023-03-29 DIAGNOSIS — R26.89 ABNORMALITY OF GAIT DUE TO IMPAIRMENT OF BALANCE: ICD-10-CM

## 2023-03-29 DIAGNOSIS — M25.561 CHRONIC PAIN OF BOTH KNEES: ICD-10-CM

## 2023-03-29 DIAGNOSIS — G89.29 CHRONIC PAIN OF BOTH KNEES: ICD-10-CM

## 2023-03-29 DIAGNOSIS — M54.50 CHRONIC BILATERAL LOW BACK PAIN WITHOUT SCIATICA: ICD-10-CM

## 2023-03-29 NOTE — PROGRESS NOTES
Daily Note     Today's date: 3/29/2023  Patient name: Khurram Marie  :   MRN: [id number]  Referring provider: Nani Thurman DO  Dx:   Encounter Diagnosis     ICD-10-CM    1  Fall, initial encounter  [address]  XXXA       2  Abnormality of gait due to impairment of balance  R26 89       3  Chronic bilateral low back pain without sciatica  M54 50     G89 29       4  Chronic pain of both knees  M25 561     M25 562     G89 29           Start Time:   Stop Time: 153  Total time in clinic (min): 50 minutes    Subjective: Patient reports having a painful day today for several reasons  Objective: See treatment diary below      Assessment: Continued with outlined program  Patient was able to progress with addition of sit to stands with foam pain and supervision  She has no exacerbating pain symptoms throughout session  ** Patient's last scheduled appointment was today; she will call to further schedule physical therapy follow ups after her kidney appointment  Patient denied incontinence issues on initial evaluation however today she voiced complications; will follow up with concerns next visit as appropriate  Plan: Progress treatment as tolerated         Diagnosis: Natasha knee pain, LBP, balance   Precautions: complex PMH   Primary Goals: 1) lumbar and knee ROM  2) core, hip, knee strength  3) balance   *asterisks by exercise = given for HEP   Manuals 2/16 3/8 3/16 3/23 3/29   PROM knees        Tibfem distraction                                There Ex        Bike?    5 min  5 min   FF in chair*  10x  HEP     3 way ball rolls  10x ea way  10x ea way 10x ea way 10x ea way   LTR  5 sec x10 ea  5 sec x 10 ea 5 sec x 10 5 sec x 10   Seated HS S*  10 x10 sec  10x10 sec natasha 10x10 sec 10x10 sec   Heel slides*  5 sec x10 natasha  5 sec x 20 natasha 5 sec x 20 5 sec x 20   Standing calf S   10x10 sec natasha standing  10x10 sec natasha                   Neuro Re-Ed        TA*  10x  hold     Kegel*  10x  hold     SLR 2x10 natasha  2x10 natasha   Clam   Supine YTB around knees alt 2x10 Supine YTB around knees alt 2x10 Supine YTB around knees alt 2x10   Standing 3 way SLR        Biodex        Tandem stance        SLS        Gait training with obstacles        Standing on foam                        Pain neuroscience education         Re-evaluation              Ther Act             Pt education    ALINE, PTA           sit to stands with blue foam        NV  2x5 foam w/supervision   Modalities             Heat                                         This patient was treated by CAMDEN Borjas under the direct supervision of Victoria Sanders PTA

## 2023-04-03 ENCOUNTER — OFFICE VISIT (OUTPATIENT)
Dept: INTERNAL MEDICINE CLINIC | Facility: CLINIC | Age: 78
End: 2023-04-03

## 2023-04-03 VITALS
OXYGEN SATURATION: 97 % | HEART RATE: 81 BPM | DIASTOLIC BLOOD PRESSURE: 82 MMHG | RESPIRATION RATE: 16 BRPM | WEIGHT: 202.8 LBS | BODY MASS INDEX: 40.88 KG/M2 | SYSTOLIC BLOOD PRESSURE: 134 MMHG | HEIGHT: 59 IN

## 2023-04-03 DIAGNOSIS — R60.9 ASYMMETRIC EDEMA OF BOTH LOWER EXTREMITIES: Primary | ICD-10-CM

## 2023-04-03 DIAGNOSIS — R60.0 EDEMA OF BOTH LOWER EXTREMITIES: ICD-10-CM

## 2023-04-03 RX ORDER — FUROSEMIDE 20 MG/1
20 TABLET ORAL DAILY
Qty: 10 TABLET | Refills: 0 | Status: SHIPPED | OUTPATIENT
Start: 2023-04-03 | End: 2023-04-13

## 2023-04-03 NOTE — ASSESSMENT & PLAN NOTE
· New onset of Bilateral Non pitting lower extremities edema   · (-) HF sx , most recent Echo (5/2022) per [facility] records EF 65-70%  · (+) dietary indiscretions  · Edema noted to be responsive to LE elevation  · Patient on CCB however this is not the asha    Plan  · Patient diuretic naive , will initiate lasix 20 mg QD for 3 days then prn for 10 days, monitor bmp and mg ( labs ordered)  · Compression stockings ordered , patient instructed to go to [facility] for personalized measurements/fitting    · Will check tsh levels ( background of hypothyroidism)  · F/u roseanne in 1 week

## 2023-04-03 NOTE — PATIENT INSTRUCTIONS
- lasix 20 mg tablet has been prescribed please take 1 tablet daily for 3 days and then as needed for leg swelling  - compression stockings have been prescribed, please go to [facility] in [address] for personalized measurements  - please maintain a diet low in sodium  - please complete your blood work a few days before your next appointment so results can be discussed with you during the visit   DASH Eating Plan   WHAT YOU NEED TO KNOW:   The DASH (Dietary Approaches to Stop Hypertension) Eating Plan is designed to help prevent or lower high blood pressure  It can also help to lower LDL (bad) cholesterol and decrease your risk for heart disease  The plan is low in sodium, sugar, unhealthy fats, and total fat  It is high in potassium, calcium, magnesium, and fiber  These nutrients are added when you eat more fruits, vegetables, and whole grains  With the DASH eating plan, you need to eat a certain number of servings from each food group  This will help you get enough of certain nutrients and limit others  The amount of servings you should eat depends on how many calories you need  Your dietitian can help you create meal plans with the right number of servings for each food group  DISCHARGE INSTRUCTIONS:   What you need to know about sodium:  Your dietitian will tell you how much sodium is safe for you to have each day  People with high blood pressure should have no more than 1,500 to 2,300 mg of sodium in a day  A teaspoon (tsp) of salt has 2,300 mg of sodium  This may seem like a difficult goal, but small changes to the foods you eat can make a big difference  Your healthcare provider or dietitian can help you create a meal plan that follows your sodium limit  Read food labels  Food labels can help you choose foods that are low in sodium  The amount of sodium is listed in milligrams (mg)   The % Daily Value (DV) column tells you how much of your daily needs are met by 1 serving of the food for each nutrient listed  Choose foods that have less than 5% of the DV of sodium  These foods are considered low in sodium  Foods that have 20% or more of the DV of sodium are considered high in sodium  Avoid foods that have more than 300 mg of sodium in each serving  Choose foods that say low-sodium, reduced-sodium, or no salt added on the food label  Limit added salt  Do not salt food at the table if you add salt when you cook  Use herbs and spices, such as onions, garlic, and salt-free seasonings to add flavor  Try lemon or lime juice or vinegar to add a tart flavor  Use hot peppers or a small amount of hot pepper sauce to add a spicy flavor  Limit foods high in added salt, such as the following:    Seasonings made with salt, such as garlic salt, celery salt, onion salt, seasoned salt, meat tenderizers, and monosodium glutamate (MSG)    Miso soup and canned or dried soup mixes    Regular soy sauce, barbecue sauce, teriyaki sauce, steak sauce, Worcestershire sauce, and most flavored vinegars    Snack foods, such as salted chips, popcorn, pretzels, pork rinds, salted crackers, and salted nuts    Frozen foods, such as dinners, entrees, vegetables with sauces, and breaded meats    Ask about salt substitutes  Ask your healthcare provider if you may use salt substitutes  Some salt substitutes have ingredients that can be harmful if you have certain health conditions  Choose foods carefully at restaurants  Meals from restaurants, especially fast food restaurants, are often high in sodium  Some restaurants have nutrition information that tells you the amount of sodium in their foods  Ask to have your food prepared with less, or no salt  What you need to know about fats:  Healthy fats include unsaturated fats and omega-3 fatty acids  Unhealthy fats include saturated fats and trans fats    Include healthy fats, such as the following:      Cooking oils, such as soybean, canola, olive, or sunflower    Fatty fish, such as salmon, tuna, mackerel, or sardines    Flaxseed oil or ground flaxseed    ½ cup of cooked beans, such as black beans, kidney beans, or zelaya beans    1½ ounces of low-sodium nuts, such as almonds or walnuts    Low-sugar, low-sodium peanut butter    Seeds such as lamine seeds or sunflower seeds       Limit or do not have unhealthy fats, such as the following:      Foods that contain fat from animals, such as fatty meats, whole milk, butter, and cream    Shortening, stick margarine, palm oil, and coconut oil    Full-fat or creamy salad dressing    Creamy soup    Crackers, chips, and baked goods made with margarine or shortening    Foods that are fried in unhealthy fats    Gravy and sauces, such as Altaf or cheese sauces    What you need to know about carbohydrates (carbs): All carbs break down into sugar  Complex carbs contain more fiber than simple carbs  This means complex carbs go into the bloodstream more slowly and cause less of a blood sugar spike  Try to include more complex carbs and fewer simple carbs  Include complex carbs, such as the followin slice of whole-grain bread    1 ounce of dry cereal that does not contain added sugar    ½ cup of cooked oatmeal    2 ounces of cooked whole-grain pasta    ½ cup of cooked brown rice    Limit or do not have simple carbs, such as the following:      [employer], such as doughnuts, pastries, and cookies    Mixes for cornbread and biscuits    White rice and pasta mixes, such as boxed macaroni and cheese    Instant and cold cereals that contain sugar    Jelly, jam, and ice cream that contain sugar    Condiments such as ketchup    Drinks high in sugar, such as soft drinks, lemonade, and fruit juice    What you need to know about vegetables and fruits:  Vegetables and fruits can be fresh, frozen, or canned  If possible, try to choose low-sodium canned options    Include a variety of vegetables and fruits, such as the followin medium apple, pear, or peach (about ½ cup chopped)    ½ small banana    ½ cup berries, such as blueberries, strawberries, or blackberries    1 cup of raw leafy greens, such as lettuce, spinach, kale, or yoel greens    ½ cup of frozen or canned (no added salt) vegetables, such as green beans    ½ cup of fresh, frozen, or canned fruit (canned in light syrup or fruit juice)    ½ cup of vegetable or fruit juice    Limit or do not have vegetables and fruits made in the following ways:      Frozen fruit such as cherries that have added sugar    Fruit in cream or butter sauce    Canned vegetables that are high in sodium    Sauerkraut, pickled vegetables, and other foods prepared in brine    Fried vegetables or vegetables in butter or high-fat sauces    What you need to know about protein foods: Include lean or low-fat protein foods, such as the following:      Poultry (chicken, turkey) with no skin    Fish (especially fatty fish, such as salmon, fresh tuna, or mackerel)    Lean beef and pork (loin, round, extra lean hamburger)    Egg whites and egg substitutes    1 cup of nonfat (skim) or 1% milk    1½ ounces of fat-free or low-fat cheese    6 ounces of nonfat or low-fat yogurt    Limit or do not have high-fat protein foods, such as the following:      Smoked or cured meat, such as corned beef, stout, ham, hot dogs, and sausage    Canned beans and canned meats or spreads, such as potted meats, sardines, anchovies, and imitation seafood    Deli or lunch meats, such as bologna, ham, turkey, and roast beef    High-fat meat (T-bone steak, regular hamburger, and ribs)    Whole eggs and egg yolks    Whole milk, 2% milk, and cream    Regular cheese and processed cheese    Other guidelines to follow:   Maintain a healthy weight  Your risk for heart disease is higher if you are overweight  Your healthcare provider may suggest that you lose weight if you are overweight  You can lose weight by eating fewer calories and foods that have added sugars and fat  The DASH meal plan can help you do this  Decrease calories by eating smaller portions at each meal and fewer snacks  Ask your healthcare provider for more information about how to lose weight  Exercise regularly  Regular exercise can help you reach or maintain a healthy weight  Regular exercise can also help decrease your blood pressure and improve your cholesterol levels  Get 30 minutes or more of moderate exercise each day of the week  To lose weight, get at least 60 minutes of exercise  Talk to your healthcare provider about the best exercise program for you  Limit alcohol  Women should limit alcohol to 1 drink a day  Men should limit alcohol to 2 drinks a day  A drink of alcohol is 12 ounces of beer, 5 ounces of wine, or 1½ ounces of liquor  For more information:   [employer] [facility] and [address]  [address]  [place] [address]  Phone: [phone]  Web Address: [facility] no    © Copyright [employer] 2022 Information is for End User's use only and may not be sold, redistributed or otherwise used for commercial purposes  The above information is an  only  It is not intended as medical advice for individual conditions or treatments  Talk to your doctor, nurse or pharmacist before following any medical regimen to see if it is safe and effective for you

## 2023-04-03 NOTE — PROGRESS NOTES
"Assessment/Plan:    Edema of both lower extremities  · New onset of Bilateral Non pitting lower extremities edema   · (-) HF sx , most recent Echo (5/2022) per [facility] records EF 65-70%  · (+) dietary indiscretions  · Edema noted to be responsive to LE elevation  · Patient on CCB however this is not the asha    Plan  · Patient diuretic naive , will initiate lasix 20 mg QD for 3 days then prn for 10 days, monitor bmp and mg ( labs ordered)  · Compression stockings ordered , patient instructed to go to [facility] for personalized measurements/fitting  · Will check tsh levels ( background of hypothyroidism)  · F/u roseanne in 1 week      Diagnoses and all orders for this visit:    Asymmetric edema of both lower extremities    Edema of both lower extremities  -     Compression Stocking  -     furosemide (LASIX) 20 mg tablet; Take 1 tablet (20 mg total) by mouth daily for 10 days  -     Basic metabolic panel; Future  -     Magnesium; Future  -     TSH + Free T4; Future        Subjective:      Patient ID: Ashok Schaefer is a 69 y o  female  Mrs Shantelle Cornell here today for evaluation of new onset MICHELLE      The following portions of the patient's history were reviewed and updated as appropriate: allergies, current medications, past family history, past medical history, past social history, past surgical history and problem list     Review of Systems   Cardiovascular: Positive for leg swelling  All other systems reviewed and are negative  Objective:      /82   Pulse 81   Resp 16   Ht 4' 11\" (1 499 m)   Wt 92 kg (202 lb 12 8 oz)   SpO2 97%   BMI 40 96 kg/m²        Physical Exam  Vitals and nursing note reviewed  Constitutional:       General: She is not in acute distress  Appearance: She is obese  She is not toxic-appearing  HENT:      Head: Normocephalic and atraumatic        Right Ear: Tympanic membrane normal       Left Ear: Tympanic membrane normal       Nose: Nose normal       Mouth/Throat:      Mouth: " Mucous membranes are moist    Eyes:      Extraocular Movements: Extraocular movements intact  Conjunctiva/sclera: Conjunctivae normal       Pupils: Pupils are equal, round, and reactive to light  Cardiovascular:      Rate and Rhythm: Normal rate  Pulses: Normal pulses  Heart sounds: No murmur heard  No gallop  Pulmonary:      Effort: Pulmonary effort is normal  No respiratory distress  Breath sounds: Normal breath sounds  No wheezing or rales  Chest:      Chest wall: No tenderness  Abdominal:      General: Bowel sounds are normal  There is no distension  Palpations: Abdomen is soft  Tenderness: There is no abdominal tenderness  Musculoskeletal:         General: Normal range of motion  Cervical back: Normal range of motion  Right lower leg: No edema  Left lower leg: No edema  Skin:     General: Skin is warm  Capillary Refill: Capillary refill takes 2 to 3 seconds  Neurological:      Mental Status: She is alert and oriented to person, place, and time  Psychiatric:         Mood and Affect: Mood normal          Behavior: Behavior normal          Thought Content:  Thought content normal          Judgment: Judgment normal            Therapeutic Lifestyle Change Visit:     One-on-one comprehensive counseling, coaching, and health behavior change visit completed

## 2023-04-04 ENCOUNTER — SOCIAL WORK (OUTPATIENT)
Dept: BEHAVIORAL/MENTAL HEALTH CLINIC | Facility: CLINIC | Age: 78
End: 2023-04-04

## 2023-04-04 DIAGNOSIS — F41.9 ANXIETY: Primary | ICD-10-CM

## 2023-04-04 NOTE — PSYCH
"Behavioral Health Psychotherapy Progress Note    Psychotherapy Provided: Individual Psychotherapy     1  Anxiety            Goals addressed in session: Goal 1     DATA: Barrett Kearney continues to struggle with her own physicall health and ambulation issues  Makes it difficult to engage in or sustain activity around house  Despite these struggles, feels her family remains large;y non-supportive as they continue to struggle with their own stressors  Long-standing marital issues persist  Continues to be ongoing issues with her adult daughter and conflict between her two adult granddaughters  Creates stress and worry  Gets some relieve and enjoyment through her dog and getting outside and interacting with neighbors  Denies any acute depressive symptoms  Denies acute anxiety issues  No SI  During this session, this clinician used the following therapeutic modalities: Solution-Focused Therapy and Supportive Psychotherapy    Substance Abuse was addressed during this session  If the client is diagnosed with a co-occurring substance use disorder, please indicate any changes in the frequency or amount of use: none  Stage of change for addressing substance use diagnoses: No substance use/Not applicable    ASSESSMENT:  Lewis Melara presents with a Dysthymic mood  her affect is Normal range and intensity, which is congruent, with her mood and the content of the session  The client has not made progress on their goals  Lewis Melara presents with a minimal risk of suicide, minimal risk of self-harm, and minimal risk of harm to others  For any risk assessment that surpasses a \"low\" rating, a safety plan must be developed  A safety plan was indicated: no  If yes, describe in detail n/a    PLAN: Between sessions, Lewis Melara will utilize stress mgmt strategies as well as productive outlets and social interactions outside of her home to manage mood issues    At the next session, the therapist will use " Solution-Focused Therapy and Supportive Psychotherapy to address mood issues  Behavioral Health Treatment Plan and Discharge Planning: Angelo Rater is aware of and agrees to continue to work on their treatment plan  They have identified and are working toward their discharge goals   no    Visit start and stop times: 2:00-3:00    04/04/23

## 2023-04-05 ENCOUNTER — NURSE TRIAGE (OUTPATIENT)
Dept: OTHER | Facility: OTHER | Age: 78
End: 2023-04-05

## 2023-04-05 NOTE — TELEPHONE ENCOUNTER
"Regarding: mediction questions  ----- Message from [facility] sent at 4/5/2023 11:31 AM EDT -----  Stephanie Flaherty am concerned with taking furosemide (LASIX) 20 mg tablet due to having kidney and heart problems along with diabetes  \"    "

## 2023-04-05 NOTE — TELEPHONE ENCOUNTER
Reason for Disposition  • Non-urgent call redirected to specialist's office because it is open     RN unable to answer pts question regarding lasix      Protocols used: NO CONTACT OR DUPLICATE CONTACT CALL-ADULT-[place]

## 2023-04-11 PROBLEM — M54.31 SCIATICA OF RIGHT SIDE: Status: ACTIVE | Noted: 2023-04-11

## 2023-04-26 ENCOUNTER — EVALUATION (OUTPATIENT)
Dept: PHYSICAL THERAPY | Facility: CLINIC | Age: 78
End: 2023-04-26

## 2023-04-26 DIAGNOSIS — G89.29 CHRONIC BILATERAL LOW BACK PAIN WITHOUT SCIATICA: ICD-10-CM

## 2023-04-26 DIAGNOSIS — M25.561 CHRONIC PAIN OF BOTH KNEES: ICD-10-CM

## 2023-04-26 DIAGNOSIS — M25.562 CHRONIC PAIN OF BOTH KNEES: ICD-10-CM

## 2023-04-26 DIAGNOSIS — G89.29 CHRONIC PAIN OF BOTH KNEES: ICD-10-CM

## 2023-04-26 DIAGNOSIS — M54.50 CHRONIC BILATERAL LOW BACK PAIN WITHOUT SCIATICA: ICD-10-CM
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
## 2023-04-26 DIAGNOSIS — R26.89 ABNORMALITY OF GAIT DUE TO IMPAIRMENT OF BALANCE: ICD-10-CM

## 2023-04-26 NOTE — PROGRESS NOTES
PT Discharge    Today's date: 2023  Patient name: Kathe Lloyd  :   MRN: [id number]  Referring provider: Carmen Clemens DO  Dx:   Encounter Diagnosis     ICD-10-CM    1  Fall, initial encounter  [address]  XXXA       2  Abnormality of gait due to impairment of balance  R26 89       3  Chronic bilateral low back pain without sciatica  M54 50     G89 29       4  Chronic pain of both knees  M25 561     M25 562     G89 29           Start Time: 1445  Stop Time: 1529  Total time in clinic (min): 44 minutes    Assessment  Assessment details: Kathe Lloyd is a pleasant 69 y o  female who presents to RE with chronic back and bilateral knee pain and balance impairments  Patient has been very inconsistent with PT due to other medical concerns, and has only been to 4 PT sessions since February  She walks with [facility] and is at high risk for falls based on TUG  She continues to have LBP, weakness, decreased endurance, and balance and gait deficits  Patient does not wish to continue with skilled PT due to financial barriers and concern that it will not work  She will return to PCP to discuss next steps    Impairments: abnormal muscle firing, abnormal muscle tone, abnormal or restricted ROM, abnormal movement, impaired physical strength, lacks appropriate home exercise program, pain with function and poor posture   Barriers to therapy: Financial barriers  Understanding of Dx/Px/POC: good   Prognosis: good    Goals  Impairment Goals 4-6 weeks  - Decrease pain to <4/10 - partially met  - Improve 4 step balance test to 40/40 - not met  - Improve TUG to within age related norms - partially met  - Increase knee strength to 4/5 throughout - partially met  - Increase hip strength to 4/5 throughout - partially met    Functional Goals 6-8 weeks  - Return to Prior Level of Function - partially met  - Patient will be independent with HEP - not met  - Patient will be able to walk safely without increased pain/compensation/difficulty - not met  - Patient will be able to perform sit to stand without increased pain/compensation/difficulty - partially met   - Patient will be able to ascend and descend stairs without increased pain/compensation/difficulty  - partially met        Plan  Planned therapy interventions: home exercise program  Treatment plan discussed with: patient        Subjective Evaluation    History of Present Illness  Mechanism of injury: WORK/SCHOOL: not working  HOME LIFE: ranch home, has moved a lot recently, lives with   HOBBIES/EXERCISE: not exercising  PLOF:  Laminectomy 1985  HISTORY OF CURRENT INJURY:  Patient was having trouble with her knees and her PCP sent her to ortho who took x-rays which showed advanced OA both sides  They then referred her to a surgeon who suggested the pain may be coming from her back  She tried to get an MRI of her thoracic spine but the insurance company denied it because she has not tried PT  She was living at [facility] and was getting PT through [facility] for 5 visits  She left that place in September 2022, and now lives in a regular home, ranch, cement floor  She tripped over her purse and fell forward and scraped up her arm  This caused a trip to the ER  She knows that she needs to get more PT and she has chronic issues with her lower back  She has already tried everything for her back including pain management without relief  She has been in PT for her back several times and did not find any relief  She uses [facility]PC all the time due to knee pain  She knows she has bad balance and an issue walking  She knows she has stenosis  She also has fibromyalgia which impacts her daily  She is very tired and sleeps late all the time which is new for her  Update: Patient has had a complicated few months medically, with inconsistent compliance with PT and HEP due to other medical concerns  She is following up in May with urology and pain management  She is stressed about this because she reports they found some sort of mass in her during a hospital visit in February where she had COVID  She then tried some PT but due to her kidney issues and discomfort she chose to wait to come back to PT until she has been checked out  She has had less issues overall in the last month so she decided to come back for her back pain  She has had problems with her back since   She knows she needs to try PT in order to get a thoracic MRI approved  She admits to having no pain in her thoracic spine  She tried the shots in her back years ago and they didn't work  She had sciatic pain in her R leg 2 weeks ago and it has stopped this past few days  PAIN LOCATION/DESCRIPTORS: No pain today  Pain in back is in the lower back on both sides  AGGRAVATING FACTORS:  Walking, getting out of a chair, bending forward, heavy lifting, weather, getting off of the sofa  Improved:  nothing  EASES: sitting down, resting  DAY PATTERN: none  IMAGING:  Severe OA knees  SPECIAL QUESTIONS:    Tj Salinas denies a new onset of Bladder incontinence, Bowel dysfunction, History of cancer, Tingling and Numbness  PATIENT GOALS: Patient reports that her main goal is to be able to be more active   - 0% improved  Pain  Current pain ratin  At best pain ratin  At worst pain ratin  Location: low back  Quality: dull ache, discomfort, tight and sharp  Progression: no change    Patient Goals  Patient goals for therapy: decreased pain, improved balance, increased motion, increased strength, independence with ADLs/IADLs and return to sport/leisure activities          Objective     Active Range of Motion     Lumbar   Flexion:  Restriction level: moderate  Extension:  Restriction level: maximal  Left lateral flexion:  with pain Restriction level: maximal  Right lateral flexion:  with pain Restriction level: maximal  Left rotation:  Restriction level: moderate  Right rotation:  Restriction level: maximal  Left Knee   Flexion: 100 degrees with pain  Extension: -10 degrees     Right Knee   Flexion: 100 degrees   Extension: -5 degrees     Strength/Myotome Testing     Left Hip   Planes of Motion   Flexion: 4-  External rotation: 4-  Internal rotation: 4    Right Hip   Planes of Motion   Flexion: 4-  External rotation: 4-  Internal rotation: 4    Left Knee   Flexion: 4-  Extension: 4-    Right Knee   Flexion: 4-  Extension: 4-    Left Ankle/Foot   Dorsiflexion: 4-    Right Ankle/Foot   Dorsiflexion: 4-    General Comments:      Lumbar Comments  Sit to stand: difficulty getting out of the chair  Ambulation: SPC utilized for balance  Standing posture: diego knees bend in standing, varus bilaterally with hips externally rotated  TUG: No AD   20 seconds with narrow PROSPER, plop into chair  30 sec STS: 3 reps using hands, felt weak  4 step balance test: 22/40 with difficulty in tandem and SLS              Diagnosis: Diego knee pain, LBP, balance   Precautions: complex PMH   Primary Goals: 1) lumbar and knee ROM  2) core, hip, knee strength  3) balance   *asterisks by exercise = given for HEP   Manuals 4/26 3/8 3/16 3/23 3/29   PROM knees        Tibfem distraction                                There Ex        Bike?    5 min  5 min   FF in chair*  10x  HEP     3 way ball rolls  10x ea way  10x ea way 10x ea way 10x ea way   LTR  5 sec x10 ea  5 sec x 10 ea 5 sec x 10 5 sec x 10   Seated HS S*  10 x10 sec  10x10 sec idego 10x10 sec 10x10 sec   Heel slides*  5 sec x10 diego  5 sec x 20 diego 5 sec x 20 5 sec x 20   Standing calf S   10x10 sec diego standing  10x10 sec diego                   Neuro Re-Ed        TA*  10x  hold     Kegel*  10x  hold     SLR   2x10 diego  2x10 diego   Clam   Supine YTB around knees alt 2x10 Supine YTB around knees alt 2x10 Supine YTB around knees alt 2x10   Standing 3 way SLR        Biodex        Tandem stance        SLS        Gait training with obstacles        Standing on foam                        Pain neuroscience education         Re-evaluation  IM, PT            Ther Act             Pt education    KLS, PTA           sit to stands with blue foam        NV  2x5 foam w/supervision   Modalities             Heat

## 2023-04-28 ENCOUNTER — CONSULT (OUTPATIENT)
Dept: ENDOCRINOLOGY | Facility: CLINIC | Age: 78
End: 2023-04-28

## 2023-04-28 VITALS
BODY MASS INDEX: 39.99 KG/M2 | DIASTOLIC BLOOD PRESSURE: 62 MMHG | HEART RATE: 97 BPM | SYSTOLIC BLOOD PRESSURE: 124 MMHG | WEIGHT: 198 LBS

## 2023-04-28 DIAGNOSIS — I10 PRIMARY HYPERTENSION: ICD-10-CM

## 2023-04-28 DIAGNOSIS — E11.8 TYPE 2 DIABETES MELLITUS WITH COMPLICATION, WITH LONG-TERM CURRENT USE OF INSULIN (HCC): Primary | ICD-10-CM

## 2023-04-28 DIAGNOSIS — E78.2 MIXED HYPERLIPIDEMIA: ICD-10-CM

## 2023-04-28 DIAGNOSIS — Z79.4 TYPE 2 DIABETES MELLITUS WITH COMPLICATION, WITH LONG-TERM CURRENT USE OF INSULIN (HCC): Primary | ICD-10-CM

## 2023-04-28 NOTE — PROGRESS NOTES
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, macular edema, CAD s/p PCI, stage 3b CKD, hypothyroidism, sHTN, HLD, fibromyalgia, arthritis who presents to endocrine clinic to establish care for type 2 diabetes mellitus    Plan:  Type 2 diabetes mellitus with hyperglycemia on Omnipod DASH  HBA1c 8 6 Sept 2022  Current regimen:  Basal rate   12 PM-12 AM 1 8 U/hr   12AM-3AM 1 8 U/hr   3AM- 12 PM 2 3U/hr     ICR   12 PM-6AM: 1: 5  6AM-4PM: 1:2  4PM-8PM: 1:2  8PM to 12 AM: 1:4    ISF:  MN-6AM: 30  6AM-11 AM: 24    11AM-4PM: 35   4PM- MN 24    AIT: 4hrs  Target is 100 milligrams per deciliter    Recommendations:  After reviewing the Dexcom G6 data as well as OmniPod DASH-data, we recommend Changing ISF 15 at all times  We will also recommend increasing basal rates  as described below:  12 PM-12 AM 2 U/hr from 1 8 units/h  12AM-3AM 2 U/hr from 1 8 units/h  3AM- 12 PM 2 5U/hr from 2 3 units/h  All the changes for insulin pump were made at bedside during the visit  We will cont with the current carb ratios  She has a max bolus of 30U set on omnipod dash pump     Discussed with the patient to input all carbs that she typically eats as she typically enters the carbs once a day per insulin pump download  We will also refer the patient to diabetes educator for carb counting, insulin pump education  She will need extensive education on basal bolus regimen, carb counting  Recommended the patient to rotate insulin pump site as she is not doing it currently  We cannot use metformin due to GFR of 37, could not use GLP-1 agonist due to prior side effects of bloating with Victoza  Follow-up CBC, CMP, lipid panel, hemoglobin A1c, microalbumin to creatinine ratio at next visit    Hypothyroidism  TSH 2 90 5 February 2023  Continue with levothyroxine 50 mcg daily  Follow-up TSH, free T4 at next visit    CC: Diabetes    History of Present Illness     HPI:  This is a 69y o -year-old female with PMH of 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, macular edema, CAD s/p PCI, stage 3b CKD, hypothyroidism, sHTN, HLD, fibromyalgia, arthritis who presents to endocrine clinic to establish care for type 2 diabetes mellitus    She was diagnosed with type 2 diabetes mellitus about 28 years ago, has been on insulin since last 5 years    She has complications of neuropathy, macular edema, CAD s/p PCI, stage 3b CKD  Denies of any DKA    Current regimen: uses omnipod Dash for about 5 years now, was on metformin prior to that  Victoza caused GI related side effects    Denies any polyuria, polydipsia, polyphagia, blurry vision  She has numbness/tingling in b/l LE  She does not rotate her insulin pump site    BG monitoring:   Utilizies dexcom G6    [employer]   Device used Dexcom  Home use     Indication   Type 2 Diabetes    More than 72 hours of data was reviewed  Report to be scanned to chart  Date Range: 4/14/23- 4/27/23    Analysis of data:   % time CGM used: 86%  Average Glucose: 305  Coefficient of Variation: 26 5%   SD : 81   Time in Target Range: 8%    Time Above Range: 91%   Time Below Range: <1%     Interpretation of data:   BG are above target range majority of the time and one day had hypoglycemic episode  Her average blood glucose was 304 mg/dL    Diet:   Breakfast: muffin  skips lunch  Dinner: Typically eats out, incorporates carbs and reports that she usually orders what she is not supposed to have  Seen by diabetes educator earlier this year     SHTN: irbesartan 300mg  HLD: crestor 20mg daily    Optho: macular edema March 2023  Foot exam: due    Thyroid disorder: on levothyroxine 50 mcg daily, takes it appropriately  Pancreatitis: none    Review of Systems   Constitutional: Negative for activity change, appetite change and fatigue     HENT: Negative for congestion and sore throat  Eyes: Negative for redness and visual disturbance  Respiratory: Negative for cough and shortness of breath  Cardiovascular: Negative for palpitations and leg swelling  Gastrointestinal: Negative for abdominal pain, constipation, diarrhea, nausea and vomiting  Endocrine: Negative for cold intolerance, heat intolerance, polydipsia, polyphagia and polyuria  Genitourinary: Negative for difficulty urinating and dysuria  Musculoskeletal: Negative for gait problem and neck pain  Skin: Negative for color change  Neurological: Positive for numbness  Negative for dizziness and syncope  Psychiatric/Behavioral: Negative for agitation and behavioral problems  All other systems reviewed and are negative        Historical Information   Past Medical History:   Diagnosis Date   • Acute embolism and thrombosis of unspecified deep veins of unspecified lower extremity (HCC)     Last Assessed:  5/18/17   • Anemia    • Anosmia    • Anxiety    • Arthritis    • Asthma    • Back pain    • Bilateral macular retinal edema    • CAD (coronary artery disease)    • Cataract    • Cervical disc herniation    • Cervical radiculopathy    • Cervical spinal stenosis    • Cervical spondylolysis    • Chronic kidney disease    • Chronic mastoiditis    • Colon polyp    • Complex endometrial hyperplasia    • Depression    • Diabetes mellitus (HCC)    • Disease of thyroid gland    • DVT (deep venous thrombosis) ([facility])    • Fibromyalgia    • Fibromyalgia, primary    • Hyperlipidemia    • Hypertension    • Hypothyroid    • Kidney stone    • Lumbar radiculopathy    • Neck pain    • Obese    • PONV (postoperative nausea and vomiting)    • Shingles 07/01/2021   • Spinal stenosis    • Stomach ulcer    • Thyroid disease      Past Surgical History:   Procedure Laterality Date   • BACK SURGERY     • CARPAL TUNNEL RELEASE     • CATARACT EXTRACTION     • CHOLECYSTECTOMY     • COLONOSCOPY     • CORONARY ANGIOPLASTY     • CORONARY ARTERY BYPASS GRAFT     • CYSTOSCOPY N/A 2017    Procedure: CYSTOSCOPY;  Surgeon: Raymon Downey MD;  Location: [facility];  Service: Gynecology Oncology   • CYSTOSCOPY     • NV LAPS TOTAL HYSTERECT 250 GM/< W/RMVL TUBE/OVARY N/A 2017    Procedure: ROBOTIC HYSTERECTOMY; BILATERAL SALPINO-OOPHERECTOMY; umbilical hernia repair ;  Surgeon: Raymon Downey MD;  Location: [facility];  Service: Gynecology Oncology   • NV REPAIR FIRST ABDOMINAL WALL HERNIA N/A 2022    Procedure: REPAIR HERNIA INCISIONAL;  Surgeon: Liz Desai DO;  Location: [facility];  Service: General   • TONSILECTOMY AND ADNOIDECTOMY     • TONSILLECTOMY     • UMBILICAL HERNIA REPAIR       Social History   Social History     Substance and Sexual Activity   Alcohol Use No     Social History     Substance and Sexual Activity   Drug Use No     Social History     Tobacco Use   Smoking Status Former   • Packs/day: 1 00   • Years: 6 00   • Pack years: 6 00   • Types: Cigarettes   • Quit date:    • Years since quittin 3   Smokeless Tobacco Never   Tobacco Comments    Smoked about a half a pack for about 4 yrs  Quite about 50 yrs ago       Family History:   Family History   Problem Relation Age of Onset   • Arthritis Mother    • Leukemia Mother    • Other Mother         Anxiety, major depressive disorder, recurrent episode with atypical features   • Coronary artery disease Father         Heart problem   • Diabetes Father    • Other Father         Infectious disease   • Alzheimer's disease Maternal Grandmother    • Other Maternal Grandfather         Heart problem   • Other Daughter         Anxiety, major depressive disorder, recurrent episode with atypical features   • Alcohol abuse Other         Grandparent   • Cancer Family    • Diabetes Family    • Hypertension Family    • Other Family         Reported prior back trouble, thyroid disorder       Meds/Allergies   Current Outpatient Medications "  Medication Sig Dispense Refill   • ALPRAZolam (XANAX) 0 5 mg tablet Take 1 tablet (0 5 mg total) by mouth 2 (two) times a day as needed for anxiety 60 tablet 0   • aspirin 81 MG tablet Take 81 mg by mouth daily     • B-D UF III MINI PEN NEEDLES 31G X 5 MM MISC      • BAQSIMI TWO PACK 3 MG/DOSE POWD Use as directed   0   • BD Insulin Syringe U/F 31G X 5/16\" 0 5 ML MISC 4 (four) times a day As directed     • cholecalciferol (VITAMIN D3) 1,000 units tablet Take 2,000 Units by mouth daily     • Coenzyme Q10 (Co Q-10) 200 MG CAPS Take by mouth in the morning     • diltiazem (TIAZAC) 300 MG 24 hr capsule Take 300 mg by mouth daily     • DULoxetine (CYMBALTA) 60 mg delayed release capsule Take 1 capsule (60 mg total) by mouth daily 90 capsule 3   • fluticasone (FLONASE) 50 mcg/act nasal spray 1 spray into each nostril daily 9 9 mL 0   • guaiFENesin (MUCINEX) 600 mg 12 hr tablet Take 2 tablets (1,200 mg total) by mouth every 12 (twelve) hours 30 tablet 0   • Insulin Disposable Pump (OmniPod Dash 5 Pack Pods) MISC USE 1 POD EVERY 1 5 DAYS     • irbesartan (AVAPRO) 300 mg tablet Take 300 mg by mouth     • isosorbide mononitrate (IMDUR) 60 mg 24 hr tablet TAKE 1 5 TABs AT NIGHT     • levothyroxine 50 mcg tablet Take 50 mcg by mouth daily     • naloxone (NARCAN) 4 mg/0 1 mL nasal spray Administer 1 spray into a nostril  If no response after 2-3 minutes, give another dose in the other nostril using a new spray  1 each 1   • nitroglycerin (NITROLINGUAL) 0 4 mg/spray spray USE ONE SPRAY Q 5 MINUTES AS NEEDED FOR CHEST PAIN  IF NO RELIEF, CALL 911   1   • NovoLOG 100 UNIT/ML SOLN INFUSE UNDER THE SKIN VIA INSULIN PUMP AS DIRECTED   TOTAL DAILY DOSE  UNITS     • rosuvastatin (CRESTOR) 20 MG tablet Take 20 mg by mouth every evening    2   • diclofenac sodium (VOLTAREN) 1 % Apply 2 g topically 4 (four) times a day (Patient not taking: Reported on 4/10/2023) 1 Tube 0   • docusate sodium (COLACE) 100 mg capsule Take 1 capsule " (100 mg total) by mouth 2 (two) times a day (Patient not taking: Reported on 4/10/2023) 20 capsule 0   • enalapril (VASOTEC) 20 mg tablet Take 20 mg by mouth daily at bedtime   (Patient not taking: Reported on 12/9/2022)     • furosemide (LASIX) 20 mg tablet Take 1 tablet (20 mg total) by mouth daily for 10 days (Patient not taking: Reported on 4/28/2023) 10 tablet 0   • insulin aspart (NovoLOG) 100 units/mL injection Inject 12 Units under the skin 3 (three) times a day before meals (Patient not taking: Reported on 4/28/2023)  0   • Insulin Disposable Pump (OmniPod Dash 5 Pack Pods) MISC Omnipod Dash Insulin Pod subcutaneous cartridge   USE 1 POD EVERY 1 5 DAYS (Patient not taking: Reported on 4/28/2023)     • Lantus 100 UNIT/ML subcutaneous injection  (Patient not taking: Reported on 4/28/2023)     • molnupiravir (Lagevrio) 200 mg capsule Lagevrio 200 mg capsule (EUA)   TAKE 4 CAPSULES (800 MG TOTAL) BY MOUTH EVERY 12 HOURS FOR 5 DAYS (Patient not taking: Reported on 4/10/2023)     • traZODone (DESYREL) 50 mg tablet TAKE 0 5-1 TABLETS (25-50 MG TOTAL) BY MOUTH DAILY AT BEDTIME AS NEEDED FOR SLEEP (Patient not taking: Reported on 4/10/2023) 90 tablet 1     No current facility-administered medications for this visit  Allergies   Allergen Reactions   • Molds & Smuts Allergic Rhinitis   • Other Allergic Rhinitis     RAGWEED, CAT DANDER, DOG DANDER    • Pollen Extract Other (See Comments)     Cold symptoms         Objective   Vitals: Blood pressure 124/62, pulse 97, weight 89 8 kg (198 lb)  Physical Exam  Constitutional:       Appearance: Normal appearance  HENT:      Head: Normocephalic and atraumatic  Cardiovascular:      Rate and Rhythm: Normal rate and regular rhythm  Pulses: Normal pulses  Heart sounds: Normal heart sounds  No murmur heard  No gallop  Pulmonary:      Effort: Pulmonary effort is normal       Breath sounds: Normal breath sounds  No wheezing or rales     Abdominal: "General: Bowel sounds are normal       Palpations: Abdomen is soft  Tenderness: There is no abdominal tenderness  Musculoskeletal:         General: No swelling  Cervical back: Normal range of motion and neck supple  Right lower leg: No edema  Left lower leg: No edema  Skin:     General: Skin is warm  Neurological:      Mental Status: She is alert and oriented to person, place, and time  Mental status is at baseline  Psychiatric:         Mood and Affect: Mood normal          Behavior: Behavior normal          The history was obtained from the review of the chart, patient  Lab Results:   Lab Results   Component Value Date/Time    Hgb A1c 8 6 (H) 09/02/2022 11:28 AM    Hgb A1c 8 3 (H) 05/04/2022 11:10 AM    WBC 11 71 (H) 10/27/2022 05:49 PM    WBC 9 12 05/05/2022 08:27 AM    Hemoglobin 13 1 10/27/2022 05:49 PM    Hemoglobin 10 8 (L) 05/05/2022 08:27 AM    Hematocrit 40 9 10/27/2022 05:49 PM    Hematocrit 35 3 05/05/2022 08:27 AM    MCV 82 10/27/2022 05:49 PM    MCV 86 05/05/2022 08:27 AM    Platelets 866 89/47/3774 05:49 PM    Platelets 299 21/57/0128 08:27 AM    BUN 18 10/27/2022 05:49 PM    BUN 18 05/05/2022 08:27 AM    Potassium 3 6 10/27/2022 05:49 PM    Potassium 4 0 05/05/2022 08:27 AM    Chloride 100 10/27/2022 05:49 PM    Chloride 104 05/05/2022 08:27 AM    CO2 24 10/27/2022 05:49 PM    CO2 27 05/05/2022 08:27 AM    Creatinine 1 92 (H) 10/27/2022 05:49 PM    Creatinine 1 35 (H) 05/05/2022 08:27 AM           Imaging Studies: I have personally reviewed pertinent reports  Portions of the record may have been created with voice recognition software  Occasional wrong word or \"sound a like\" substitutions may have occurred due to the inherent limitations of voice recognition software  Read the chart carefully and recognize, using context, where substitutions have occurred    "

## 2023-05-02 ENCOUNTER — SOCIAL WORK (OUTPATIENT)
Dept: BEHAVIORAL/MENTAL HEALTH CLINIC | Facility: CLINIC | Age: 78
End: 2023-05-02

## 2023-05-02 DIAGNOSIS — F41.9 ANXIETY: Primary | ICD-10-CM

## 2023-05-02 NOTE — PSYCH
"Behavioral Health Psychotherapy Progress Note    Psychotherapy Provided: Individual Psychotherapy     1  Anxiety            Goals addressed in session: Goal 1     DATA: [facility] continues to struggle to manage stress, anxiety, frustration regarding multiple stressors including her own physical health issues, marital issues related to  and ongoing stressors related to her adult children and grandchildren  Family tends to funnel all stressors to her in an effort for her to assist them or resolve but this is no longer feasible due to her financial and health issues  States the stress has affected her sleep  Mind tends to race at bed regarding stressors  Pain issues also affect sleep  Continues to enjoy the contact and company of her dog and grandchildren  Struggles with periods of decreased energy, motivation or interest  Denies any SI  Processed stressors during session- validation and supportive therapy provided  During this session, this clinician used the following therapeutic modalities: Solution-Focused Therapy and Supportive Psychotherapy    Substance Abuse was addressed during this session  If the client is diagnosed with a co-occurring substance use disorder, please indicate any changes in the frequency or amount of use: none  Stage of change for addressing substance use diagnoses: No substance use/Not applicable    ASSESSMENT:  Sanjuanita Garcia presents with a Dysthymic mood  her affect is Normal range and intensity, which is congruent, with her mood and the content of the session  The client has not made progress on their goals  Sanjuanita Gracia presents with a minimal risk of suicide, minimal risk of self-harm, and minimal risk of harm to others  For any risk assessment that surpasses a \"low\" rating, a safety plan must be developed      A safety plan was indicated: no  If yes, describe in detail n/a    PLAN: Between sessions, Sanjuanita Garcia will utilize appropriate stress mgmt " strategies and productive outlets to manage any stress/mood issues  Reviewed boundaries to maintain within her family relationships to limit stress    At the next session, the therapist will use Solution-Focused Therapy and Supportive Psychotherapy to address mood issues  Behavioral Health Treatment Plan and Discharge Planning: Rajeev Johnny is aware of and agrees to continue to work on their treatment plan  They have identified and are working toward their discharge goals   yes    Visit start and stop times: 3:00-4:10    05/02/23

## 2023-05-05 DIAGNOSIS — F41.1 GAD (GENERALIZED ANXIETY DISORDER): ICD-10-CM

## 2023-05-05 DIAGNOSIS — F41.0 PANIC ATTACKS: ICD-10-CM

## 2023-05-05 DIAGNOSIS — F41.9 ANXIETY: ICD-10-CM

## 2023-05-05 NOTE — TELEPHONE ENCOUNTER
Medication Refill Request     Name ALPRAZolam Anabel Mc) 0 5 mg tablet  Dose/Frequency Take 1 tablet (0 5 mg total) by mouth 2 (two) times a day as needed for anxiety  Quantity 60 tablet  Verified pharmacy   [x]  Verified ordering Provider   [x]  Does patient have enough for the next 3 days?  Yes [x] No []

## 2023-05-07 RX ORDER — ALPRAZOLAM 0.5 MG/1
0.5 TABLET ORAL 2 TIMES DAILY PRN
Qty: 60 TABLET | Refills: 0 | Status: SHIPPED | OUTPATIENT
Start: 2023-05-07

## 2023-05-10 LAB
CREAT ?TM UR-SCNC: 60.1 UMOL/L
MICROALBUMIN/CREAT UR: 117.5 MG/G{CREAT}

## 2023-05-15 NOTE — PROGRESS NOTES
Referring Physician: Ho Rdz DO  A copy of this note was sent to the referring physician  Diagnoses and all orders for this visit:    Kidney mass  -     Ambulatory Referral to Urology  -     US kidney and bladder; Future            Assessment and plan:       1   1 cm right renal mass  -No clear enhancement is documented on outside imaging  -Multiple bilateral renal cysts are also noted  -Current GFR: 37    2  Medical comorbidities: Chronic kidney disease, type 2 diabetes, hypothyroidism, obstructive sleep apnea, obesity BMI 39    We discussed the nature of renal cysts  The lesion as described I think is very low risk especially given its small size  As such Given the equivocal nature of this lesion and the patient's significant competing risk factors and most pressingly her CKD I believe that she is excellent candidate for active surveillance  Patient will follow-up in January (1 year after her initial CT) with repeat imaging  We will schedule a renal ultrasound given her underlying CKD in order to obviate the need for any iodinated contrast     She will follow-up with our advanced practitioner team after imaging  Assuming there are no lesions of concern we will likely plan for 1 additional annual ultrasound and no further follow-up will be required after 2-year period's of surveillance  Rosemarie Eubanks MD      Chief Complaint     Renal mass consultation      History of Present Illness     Francesca Greenberg is a 69 y o  male referred in consultation by Dr Izabela Mcarthur for renal mass    Detailed Urologic History     - please refer to HPI    Review of Systems     Review of Systems   Constitutional: Negative for activity change and fatigue  HENT: Negative for congestion  Eyes: Negative for visual disturbance  Respiratory: Negative for shortness of breath and wheezing  Cardiovascular: Negative for chest pain and leg swelling  Gastrointestinal: Negative for abdominal pain  "  Endocrine: Negative for polyuria  Genitourinary: Negative for dysuria, flank pain, hematuria and urgency  Musculoskeletal: Negative for back pain  Allergic/Immunologic: Negative for immunocompromised state  Neurological: Negative for dizziness and numbness  Psychiatric/Behavioral: Negative for dysphoric mood  All other systems reviewed and are negative  Allergies     Allergies   Allergen Reactions   • Molds & Smuts Allergic Rhinitis   • Other Allergic Rhinitis     RAGWEED, CAT DANDER, DOG DANDER    • Pollen Extract Other (See Comments)     Cold symptoms         Physical Exam       Physical Exam  Constitutional:       General: He is not in acute distress  Appearance: He is well-developed  HENT:      Head: Normocephalic and atraumatic  Cardiovascular:      Comments: Negative lower extremity edema  Pulmonary:      Effort: Pulmonary effort is normal       Breath sounds: Normal breath sounds  Abdominal:      Palpations: Abdomen is soft  Musculoskeletal:         General: Normal range of motion  Cervical back: Normal range of motion  Skin:     General: Skin is warm  Neurological:      Mental Status: He is alert and oriented to person, place, and time  Psychiatric:         Behavior: Behavior normal            Vital Signs  There were no vitals filed for this visit        Current Medications       Current Outpatient Medications:   •  ALPRAZolam (XANAX) 0 5 mg tablet, Take 1 tablet (0 5 mg total) by mouth 2 (two) times a day as needed for anxiety, Disp: 60 tablet, Rfl: 0  •  aspirin 81 MG tablet, Take 81 mg by mouth daily, Disp: , Rfl:   •  B-D UF III MINI PEN NEEDLES 31G X 5 MM MISC, , Disp: , Rfl:   •  BAQSIMI TWO PACK 3 MG/DOSE POWD, Use as directed , Disp: , Rfl: 0  •  BD Insulin Syringe U/F 31G X 5/16\" 0 5 ML MISC, 4 (four) times a day As directed, Disp: , Rfl:   •  cholecalciferol (VITAMIN D3) 1,000 units tablet, Take 2,000 Units by mouth daily, Disp: , Rfl:   •  Coenzyme Q10 " (Co Q-10) 200 MG CAPS, Take by mouth in the morning, Disp: , Rfl:   •  diclofenac sodium (VOLTAREN) 1 %, Apply 2 g topically 4 (four) times a day (Patient not taking: Reported on 4/10/2023), Disp: 1 Tube, Rfl: 0  •  diltiazem (TIAZAC) 300 MG 24 hr capsule, Take 300 mg by mouth daily, Disp: , Rfl:   •  docusate sodium (COLACE) 100 mg capsule, Take 1 capsule (100 mg total) by mouth 2 (two) times a day (Patient not taking: Reported on 4/10/2023), Disp: 20 capsule, Rfl: 0  •  DULoxetine (CYMBALTA) 60 mg delayed release capsule, Take 1 capsule (60 mg total) by mouth daily, Disp: 90 capsule, Rfl: 3  •  enalapril (VASOTEC) 20 mg tablet, Take 20 mg by mouth daily at bedtime   (Patient not taking: Reported on 12/9/2022), Disp: , Rfl:   •  fluticasone (FLONASE) 50 mcg/act nasal spray, 1 spray into each nostril daily, Disp: 9 9 mL, Rfl: 0  •  furosemide (LASIX) 20 mg tablet, Take 1 tablet (20 mg total) by mouth daily for 10 days (Patient not taking: Reported on 4/28/2023), Disp: 10 tablet, Rfl: 0  •  guaiFENesin (MUCINEX) 600 mg 12 hr tablet, Take 2 tablets (1,200 mg total) by mouth every 12 (twelve) hours, Disp: 30 tablet, Rfl: 0  •  insulin aspart (NovoLOG) 100 units/mL injection, Inject 12 Units under the skin 3 (three) times a day before meals (Patient not taking: Reported on 4/28/2023), Disp: , Rfl: 0  •  Insulin Disposable Pump (OmniPod Dash 5 Pack Pods) MISC, USE 1 POD EVERY 1 5 DAYS, Disp: , Rfl:   •  Insulin Disposable Pump (OmniPod Dash 5 Pack Pods) MISC, Omnipod Dash Insulin Pod subcutaneous cartridge  USE 1 POD EVERY 1 5 DAYS (Patient not taking: Reported on 4/28/2023), Disp: , Rfl:   •  irbesartan (AVAPRO) 300 mg tablet, Take 300 mg by mouth, Disp: , Rfl:   •  isosorbide mononitrate (IMDUR) 60 mg 24 hr tablet, TAKE 1 5 TABs AT NIGHT, Disp: , Rfl:   •  Lantus 100 UNIT/ML subcutaneous injection, , Disp: , Rfl:   •  levothyroxine 50 mcg tablet, Take 50 mcg by mouth daily, Disp: , Rfl:   •  molnupiravir (Lagevrio) 200 mg capsule, Lagevrio 200 mg capsule (EUA)  TAKE 4 CAPSULES (800 MG TOTAL) BY MOUTH EVERY 12 HOURS FOR 5 DAYS (Patient not taking: Reported on 4/10/2023), Disp: , Rfl:   •  naloxone (NARCAN) 4 mg/0 1 mL nasal spray, Administer 1 spray into a nostril  If no response after 2-3 minutes, give another dose in the other nostril using a new spray , Disp: 1 each, Rfl: 1  •  nitroglycerin (NITROLINGUAL) 0 4 mg/spray spray, USE ONE SPRAY Q 5 MINUTES AS NEEDED FOR CHEST PAIN  IF NO RELIEF, CALL 911 , Disp: , Rfl: 1  •  NovoLOG 100 UNIT/ML SOLN, INFUSE UNDER THE SKIN VIA INSULIN PUMP AS DIRECTED   TOTAL DAILY DOSE  UNITS, Disp: , Rfl:   •  rosuvastatin (CRESTOR) 20 MG tablet, Take 20 mg by mouth every evening  , Disp: , Rfl: 2  •  traZODone (DESYREL) 50 mg tablet, TAKE 0 5-1 TABLETS (25-50 MG TOTAL) BY MOUTH DAILY AT BEDTIME AS NEEDED FOR SLEEP (Patient not taking: Reported on 4/10/2023), Disp: 90 tablet, Rfl: 1      Active Problems     Patient Active Problem List   Diagnosis   • Panic attacks   • Type 2 diabetes mellitus with complication, with long-term current use of insulin ([facility])   • MDD (major depressive disorder), recurrent severe, without psychosis ([facility] )   • Chest pain on breathing   • History of DVT (deep vein thrombosis)   • PVC's (premature ventricular contractions)   • CAD (coronary artery disease)   • Essential hypertension   • Hyperlipidemia   • Fibromyalgia   • Spinal stenosis of lumbar region   • Adenomatous polyp of colon   • Vitreo-retinal adhesions   • Vitamin D deficiency   • Vitamin B12 deficiency   • Ventral hernia   • Vascular claudication ([facility])   • Thickened endometrium   • Spondylosis of lumbar region without myelopathy or radiculopathy   • Spondylosis of cervical region without myelopathy or radiculopathy   • Palpitations   • Onychomycosis   • 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   • Class 3 severe obesity due to excess calories without serious comorbidity in adult, unspecified BMI ([facility])   • Myofascial pain syndrome   • Retinal edema   • Diabetic polyneuropathy associated with type 2 diabetes mellitus ([facility])   • SARAY (obstructive sleep apnea)   • Allergic rhinitis   • Pulmonary nodules   • Lumbar spondylosis   • Chronic pain syndrome   • Lumbar facet arthropathy   • Post-nasal drip   • Vision changes   • Closed fracture of nasal bones   • Restrictive lung disease secondary to obesity   • MADAN (generalized anxiety disorder)   • Insomnia   • Chronic renal impairment, stage 4 (severe) ([facility])   • Other proteinuria   • Lung nodule   • Abdominal bloating   • Neck pain   • Herpes zoster without complication   • Post herpetic neuralgia   • SOB (shortness of breath)   • Apraxia   • Skin lesion   • Microalbuminuria   • Stage 3b chronic kidney disease ([facility])   • Hypothyroidism   • Diplopia   • Lumbar radiculopathy   • Contracture of hand   • Colon polyps   • DVT (deep venous thrombosis) ([facility])   • Presence of stent in coronary artery   • Psoriasis with arthropathy ([facility])   • S/P CABG (coronary artery bypass graft)   • Complex endometrial hyperplasia   • Mixed hyperlipidemia   • Lung nodule   • Other constipation   • Osteoarthritis of spine with radiculopathy, lumbar region   • Protrusion of intervertebral disc of thoracic region   • Encounter for screening mammogram for breast cancer   • Anxiety   • Elevated serum creatinine   • Chronic kidney disease, stage 4 (severe) ([facility])   • Kidney mass   • Fall   • COVID-19   • Edema of both lower extremities   • Sciatica of right side         Past Medical History     Past Medical History:   Diagnosis Date   • Acute embolism and thrombosis of unspecified deep veins of unspecified lower extremity ([facility])     Last Assessed:  5/18/17   • Anemia    • Anosmia    • Anxiety    • Arthritis    • Asthma    • Back pain    • Bilateral macular retinal edema    • CAD (coronary artery disease)    • Cataract    • Cervical disc herniation    • Cervical radiculopathy    • Cervical spinal stenosis    • Cervical spondylolysis    • Chronic kidney disease    • Chronic mastoiditis    • Colon polyp    • Complex endometrial hyperplasia    • Depression    • Diabetes mellitus ([facility])    • Disease of thyroid gland    • DVT (deep venous thrombosis) ([facility])    • Fibromyalgia    • Fibromyalgia, primary    • Hyperlipidemia    • Hypertension    • Hypothyroid    • Kidney stone    • Lumbar radiculopathy    • Neck pain    • Obese    • PONV (postoperative nausea and vomiting)    • Shingles 07/01/2021   • Spinal stenosis    • Stomach ulcer    • Thyroid disease          Surgical History     Past Surgical History:   Procedure Laterality Date   • BACK SURGERY     • CARPAL TUNNEL RELEASE     • CATARACT EXTRACTION     • CHOLECYSTECTOMY     • COLONOSCOPY     • CORONARY ANGIOPLASTY     • CORONARY ARTERY BYPASS GRAFT     • CYSTOSCOPY N/A 6/20/2017    Procedure: CYSTOSCOPY;  Surgeon: Henrry Resendez MD;  [facility];  Service: Gynecology Oncology   • CYSTOSCOPY     • MT LAPS TOTAL HYSTERECT 250 GM/< W/RMVL TUBE/OVARY N/A 6/20/2017    Procedure: ROBOTIC HYSTERECTOMY; BILATERAL SALPINO-OOPHERECTOMY; umbilical hernia repair ;  Surgeon: Henrry Resendez MD;  Location[facility];  Service: Gynecology Oncology   • MT REPAIR FIRST ABDOMINAL WALL HERNIA N/A 5/12/2022    Procedure: REPAIR HERNIA INCISIONAL;  Surgeon: Madison Miller DO;  Location: [facility];  Service: General   • TONSILECTOMY AND ADNOIDECTOMY     • TONSILLECTOMY     • UMBILICAL HERNIA REPAIR           Family History     Family History   Problem Relation Age of Onset   • Arthritis Mother    • Leukemia Mother    • Other Mother         Anxiety, major depressive disorder, recurrent episode with atypical features   • Coronary artery disease Father         Heart problem   • Diabetes Father    • Other Father         Infectious disease   • Alzheimer's disease Maternal Grandmother    • Other Maternal Grandfather         Heart problem   • Other Daughter         Anxiety, major depressive disorder, recurrent episode with atypical features   • Alcohol abuse Other         Grandparent   • Cancer Family    • Diabetes Family    • Hypertension Family    • Other Family         Reported prior back trouble, thyroid disorder         Social History     Social History     Social History     Tobacco Use   Smoking Status Former   • Packs/day: 1 00   • Years: 6    • Pack years: 6    • Types: Cigarettes   • Quit date:    • Years since quittin 3   Smokeless Tobacco Never   Tobacco Comments    Smoked about a half a pack for about 4 yrs  Quite about 50 yrs ago  Pertinent Lab Values     Lab Results   Component Value Date    CREATININE 1 92 (H) 10/27/2022       No results found for: PSA    @RESULTRCNT(1H])@      Pertinent Imaging         Exam: CT of the chest, abdomen, and pelvis with IV contrast        Technique: Using helical technique, axial images were obtained through the   chest, abdomen, and pelvis following administration of 85 cc of Omnipaque   350 intravenous contrast  Coronal and sagittal reformations were performed        Comparison: CT abdomen and pelvis 2010       Chest CT  Lungs/Pleura: No pneumothorax  Noncalcified pulmonary nodule in the right middle   lobe measuring 5 7 mm similar to the prior CT study of 2010  Stable minor   sites of scarring base the lingula segment and left lower lobe  Sites of   atelectasis and/or scarring anteriorly in the mid left upper lobe  Mediastinum/Lymph nodes: No mediastinal hemorrhage  No adenopathy  Heart/Vessels: Status post CABG  Heart not overtly enlarged  No pericardial   effusion  Chest Wall: No chest wall hematoma        Abdomen CT  Liver: No focal liver lesion  Hepatic veins and portal veins are patent  Gallbladder/Bile ducts: Status post cholecystectomy  No biliary ductal   dilatation       Spleen: Normal      Pancreas: Normal      Adrenal glands: "Normal      Kidneys/Ureters: Renal functions symmetrical  No hydronephrosis  2 mm calculus   mid to upper right kidney  There are multiple hypodense renal cortical lesions  Cortical lesion lower outer margin of the right kidney measuring 1 cm within   internal attenuation of 66 HU, an enhancing lesion is a consideration  1 8 cm   hypodense cortical lesion lower medial pole of the right kidney with an internal   attenuation of 11, likely a benign cyst  1 4 cm hypodense cortical lesion   posterior mid left kidney which may be mildly complex  Bowel/Mesentery: No free intraperitoneal gas  No mesenteric hematoma  No   hemoperitoneum  There is no bowel obstruction  No appendicitis  Lymph nodes: There are no pathologically enlarged abdominal lymph nodes  Vessels: Arteriosclerotic disease  Celiac patent  Arteriosclerotic disease with   narrowing noted at the origin of the SMA which remains patent  Renal arteries   patent  TOMMIE patent  Abdominal wall: Normal      Pelvis CT  Pelvis: An no abnormal pelvic fluid collections  Status post hysterectomy  Urinary Bladder:  Normal      Lymph nodes:  Normal        Bones: No acute disease  Age-related degenerative changes  Multiple sternal   sutures        IMPRESSION:   Impression:   1  CT examination of the chest, abdomen and pelvis demonstrates no acute   disease  2  Multiple renal cortical lesions  There is a 1 cm cortical lesion lower outer   margin of the right kidney which does not represent a simple cyst  A small   enhancing neoplasm not excluded  A follow-up study performed prior to and after   intravenous contrast material is recommended  3  Stable small pulmonary nodule right middle lobe  4  Arteriosclerotic disease with narrowing at the origin of the patent superior   mesenteric artery  Portions of the record may have been created with voice recognition software    Occasional wrong word or \"sound a like\" substitutions may have occurred " due to the inherent limitations of voice recognition software  In addition some of the content generated from this outpatient encounter includes information designed for patient education and/or communication back to the referring provider  Read the chart carefully and recognize, using context, where substitutions have occurred

## 2023-05-16 ENCOUNTER — OFFICE VISIT (OUTPATIENT)
Dept: UROLOGY | Facility: CLINIC | Age: 78
End: 2023-05-16

## 2023-05-16 VITALS
WEIGHT: 199 LBS | DIASTOLIC BLOOD PRESSURE: 70 MMHG | RESPIRATION RATE: 20 BRPM | HEIGHT: 59 IN | BODY MASS INDEX: 40.12 KG/M2 | OXYGEN SATURATION: 93 % | SYSTOLIC BLOOD PRESSURE: 162 MMHG | HEART RATE: 112 BPM

## 2023-05-16 DIAGNOSIS — N28.89 KIDNEY MASS: Primary | ICD-10-CM

## 2023-05-30 ENCOUNTER — SOCIAL WORK (OUTPATIENT)
Dept: BEHAVIORAL/MENTAL HEALTH CLINIC | Facility: CLINIC | Age: 78
End: 2023-05-30

## 2023-05-30 DIAGNOSIS — F41.9 ANXIETY: Primary | ICD-10-CM

## 2023-05-30 NOTE — PSYCH
"Behavioral Health Psychotherapy Progress Note    Psychotherapy Provided: Individual Psychotherapy     1  Anxiety            Goals addressed in session: Goal 1     DATA: Dionna Mak continues to struggle with stress and anxiety related to her multiple physical health issues and the chronic pain she experiences daily  In addition, continues to deal with these issues despite having consistent emotional support form her spouse and family  Doing the best she can to manage but this is difficult at times  Denies any acute issues with depression or anxiety at present  No SI  Utilizes session to process stressors- validation and supportive therapy provided  During this session, this clinician used the following therapeutic modalities: Solution-Focused Therapy and Supportive Psychotherapy    Substance Abuse was addressed during this session  If the client is diagnosed with a co-occurring substance use disorder, please indicate any changes in the frequency or amount of use: none  Stage of change for addressing substance use diagnoses: No substance use/Not applicable    ASSESSMENT:  Lilli Moreno presents with a Dysthymic mood  her affect is Flat, which is congruent, with her mood and the content of the session  The client has not made progress on their goals  Lilli Moreno presents with a minimal risk of suicide, minimal risk of self-harm, and minimal risk of harm to others  For any risk assessment that surpasses a \"low\" rating, a safety plan must be developed  A safety plan was indicated: no  If yes, describe in detail n/a    PLAN: Between sessions, Lilli Moreno will utilize appropriate coping strategies and productive outlets identified during session  on an increasing basis to manage anxiety and depression    At the next session, the therapist will use Solution-Focused Therapy to address anxiety and depression      Behavioral Health Treatment Plan and Discharge Planning: Lilli Moreno is aware of " and agrees to continue to work on their treatment plan  They have identified and are working toward their discharge goals   no    Visit start and stop times: 3:00-4:00    05/30/23

## 2023-05-31 ENCOUNTER — OFFICE VISIT (OUTPATIENT)
Dept: PAIN MEDICINE | Facility: CLINIC | Age: 78
End: 2023-05-31

## 2023-05-31 VITALS
SYSTOLIC BLOOD PRESSURE: 134 MMHG | HEART RATE: 73 BPM | DIASTOLIC BLOOD PRESSURE: 62 MMHG | WEIGHT: 199 LBS | BODY MASS INDEX: 40.19 KG/M2

## 2023-05-31 DIAGNOSIS — M51.14 INTERVERTEBRAL DISC DISORDERS WITH RADICULOPATHY, THORACIC REGION: ICD-10-CM

## 2023-05-31 DIAGNOSIS — M51.16 INTERVERTEBRAL DISC DISORDER WITH RADICULOPATHY OF LUMBAR REGION: Primary | ICD-10-CM

## 2023-05-31 NOTE — PROGRESS NOTES
Assessment:  1  Intervertebral disc disorder with radiculopathy of lumbar region    2  Intervertebral disc disorders with radiculopathy, thoracic region        Plan:  The patient is experiencing worsening back and leg symptoms with gait instability despite 6 weeks of physical therapy  At this time, I will order updated MRI of the thoracic spine to assess T11-12 disc herniation and stenosis as well as MRI lumbar spine to assess for worsening stenosis which is causing the gait instability  My impressions and treatment recommendations were discussed in detail with the patient who verbalized understanding and had no further questions  Discharge instructions were provided  I personally saw and examined the patient and I agree with the above discussed plan of care  Orders Placed This Encounter   Procedures   • MRI thoracic spine wo contrast     Reassess T11-12 HNP, stenosis; gait instability     Standing Status:   Future     Standing Expiration Date:   5/31/2027     Scheduling Instructions: There is no preparation for this test  Please leave your jewelry and valuables at home, wedding rings are the exception  Please bring your physician order, insurance cards, a form of photo ID and a list of your medications with you  Arrive 15 minutes prior to your appointment time in order to       register  Please bring any prior CT or MRI studies of this area that were not performed at a [facility]  To schedule this appointment, please contact Central Scheduling at [phone]  Order Specific Question:   What is the patient's sedation requirement? Answer:   No Sedation   • MRI lumbar spine without contrast     Standing Status:   Future     Standing Expiration Date:   5/31/2027     Scheduling Instructions: There is no preparation for this test  Please leave your jewelry and valuables at home, wedding rings are the exception   All patients will be required to change into a hospital gown and pants  Street clothes are not permitted in the MRI  Magnetic nail polish must be removed prior to arrival for your test  Please bring your insurance cards, a form of photo ID and a list of your medications with you  Arrive 15 minutes prior to your appointment time in order to register  Please bring any prior CT or MRI studies of this area that were not performed at a [facility]  To schedule this appointment, please contact Central Scheduling at [phone]  Prior to your appointment, please make sure you complete the MRI Screening Form when you e-Check in for your appointment  This will be available starting 7 days before your appointment in [address]  You may receive an e-mail with an activation code if you do not have a [employer] account  If you do not have access to a device, we will complete your screening at your appointment  Order Specific Question:   What is the patient's sedation requirement? Answer:   No Sedation     Order Specific Question:   Does this procedure require the 3T MRI at [facility] or [place]?     Answer:   No     Order Specific Question:   Release to patient through [employer]     Answer:   Immediate     Order Specific Question:   Is order priority selected as STAT? Answer:   No     Order Specific Question:   Reason for Exam (FREE TEXT)     Answer:   lbp into legs with weakness     No orders of the defined types were placed in this encounter  History of Present Illness:  Alessandro Monteiro is a 69 y o  female who presents for a follow up office visit in regards to Knee Pain (Bilateral/) and Back Pain  The patient was last seen in 20 2021 and returns for follow-up  She reports that overall her symptoms are worsening  She is having trouble walking with her balance and is also experiencing more pain in both legs as well as the back    She states that she did see the neurosurgery PA who had ordered MRIs of the thoracic and lumbar spine which were denied for lack of physical therapy  She has done 6 weeks of physical therapy earlier this year and reports no improvement  She continues to experience moderate to severe pain rated 8/10 on numeric rating scale and felt nearly constant but worse when she is active  She states that she is having trouble walking and loses her balance  I have personally reviewed and/or updated the patient's past medical history, past surgical history, family history, social history, current medications, allergies, and vital signs today  Review of Systems   Respiratory: Negative for shortness of breath  Cardiovascular: Negative for chest pain  Gastrointestinal: Negative for constipation, diarrhea, nausea and vomiting  Musculoskeletal: Positive for back pain, gait problem and joint swelling  Negative for arthralgias and myalgias  Skin: Negative for rash  Neurological: Positive for weakness  Negative for dizziness and seizures  All other systems reviewed and are negative        Patient Active Problem List   Diagnosis   • Panic attacks   • Type 2 diabetes mellitus with complication, with long-term current use of insulin ([facility])   • MDD (major depressive disorder), recurrent severe, without psychosis ([facility] [address] )   • Chest pain on breathing   • History of DVT (deep vein thrombosis)   • PVC's (premature ventricular contractions)   • CAD (coronary artery disease)   • Essential hypertension   • Hyperlipidemia   • Fibromyalgia   • Spinal stenosis of lumbar region   • Adenomatous polyp of colon   • Vitreo-retinal adhesions   • Vitamin D deficiency   • Vitamin B12 deficiency   • Ventral hernia   • Vascular claudication ([facility])   • Thickened endometrium   • Spondylosis of lumbar region without myelopathy or radiculopathy   • Spondylosis of cervical region without myelopathy or radiculopathy   • Palpitations   • Onychomycosis   • 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   • Class 3 severe obesity due to excess calories without serious comorbidity in adult, unspecified BMI ([facility])   • Myofascial pain syndrome   • Retinal edema   • Diabetic polyneuropathy associated with type 2 diabetes mellitus ([facility])   • SARAY (obstructive sleep apnea)   • Allergic rhinitis   • Pulmonary nodules   • Lumbar spondylosis   • Chronic pain syndrome   • Lumbar facet arthropathy   • Post-nasal drip   • Vision changes   • Closed fracture of nasal bones   • Restrictive lung disease secondary to obesity   • MADAN (generalized anxiety disorder)   • Insomnia   • Chronic renal impairment, stage 4 (severe) ([facility])   • Other proteinuria   • Lung nodule   • Abdominal bloating   • Neck pain   • Herpes zoster without complication   • Post herpetic neuralgia   • SOB (shortness of breath)   • Apraxia   • Skin lesion   • Microalbuminuria   • Stage 3b chronic kidney disease ([facility])   • Hypothyroidism   • Diplopia   • Lumbar radiculopathy   • Contracture of hand   • Colon polyps   • DVT (deep venous thrombosis) ([facility])   • Presence of stent in coronary artery   • Psoriasis with arthropathy ([facility])   • S/P CABG (coronary artery bypass graft)   • Complex endometrial hyperplasia   • Mixed hyperlipidemia   • Lung nodule   • Other constipation   • Osteoarthritis of spine with radiculopathy, lumbar region   • Protrusion of intervertebral disc of thoracic region   • Encounter for screening mammogram for breast cancer   • Anxiety   • Elevated serum creatinine   • Chronic kidney disease, stage 4 (severe) ([facility])   • Kidney mass   • Fall   • COVID-19   • Edema of both lower extremities   • Sciatica of right side       Past Medical History:   Diagnosis Date   • Acute embolism and thrombosis of unspecified deep veins of unspecified lower extremity ([facility])     Last Assessed:  5/18/17   • Anemia    • Anosmia    • Anxiety    • Arthritis    • Asthma    • Back pain    • Bilateral macular retinal edema    • CAD (coronary artery disease)    • Cataract    • Cervical disc herniation    • Cervical radiculopathy    • Cervical spinal stenosis    • Cervical spondylolysis    • Chronic kidney disease    • Chronic mastoiditis    • Colon polyp    • Complex endometrial hyperplasia    • Depression    • Diabetes mellitus (H[facility])    • Disease of thyroid gland    • DVT (deep venous thrombosis) (HCC)    • Fibromyalgia    • Fibromyalgia, primary    • Hyperlipidemia    • Hypertension    • Hypothyroid    • Kidney stone    • Lumbar radiculopathy    • Neck pain    • Obese    • PONV (postoperative nausea and vomiting)    • Shingles 07/01/2021   • Spinal stenosis    • Stomach ulcer    • Thyroid disease        Past Surgical History:   Procedure Laterality Date   • BACK SURGERY     • CARPAL TUNNEL RELEASE     • CATARACT EXTRACTION     • CHOLECYSTECTOMY     • COLONOSCOPY     • CORONARY ANGIOPLASTY     • CORONARY ARTERY BYPASS GRAFT     • CYSTOSCOPY N/A 6/20/2017    Procedure: CYSTOSCOPY;  Surgeon: Hannah Rodarte MD;  [facility]: [facility];  Service: Gynecology Oncology   • CYSTOSCOPY     • UT LAPS TOTAL HYSTERECT 250 GM/< W/RMVL TUBE/OVARY N/A 6/20/2017    Procedure: ROBOTIC HYSTERECTOMY; BILATERAL SALPINO-OOPHERECTOMY; umbilical hernia repair ;  Surgeon: Hannah Rodarte MD;  Location[facility];  Service: Gynecology Oncology   • UT REPAIR FIRST ABDOMINAL WALL HERNIA N/A 5/12/2022    Procedure: REPAIR HERNIA INCISIONAL;  Surgeon: Katrina Mims DO;  Location[facility];  Service: General   • TONSILECTOMY AND ADNOIDECTOMY     • TONSILLECTOMY     • UMBILICAL HERNIA REPAIR         Family History   Problem Relation Age of Onset   • Arthritis Mother    • Leukemia Mother    • Other Mother         Anxiety, major depressive disorder, recurrent episode with atypical features   • Coronary artery disease Father         Heart problem   • Diabetes Father    • Other Father         Infectious disease   • Alzheimer's disease Maternal Grandmother    • Other Maternal Grandfather         Heart problem   • Other Daughter "Anxiety, major depressive disorder, recurrent episode with atypical features   • Alcohol abuse Other         Grandparent   • Cancer Family    • Diabetes Family    • Hypertension Family    • Other Family         Reported prior back trouble, thyroid disorder       Social History     Occupational History   • Occupation: Retired   Tobacco Use   • Smoking status: Former     Packs/day: 1 00     Years: 6 00     Total pack years: 6 00     Types: Cigarettes     Quit date:      Years since quittin 4   • Smokeless tobacco: Never   • Tobacco comments:     Smoked about a half a pack for about 4 yrs  Quite about 50 yrs ago     Vaping Use   • Vaping Use: Never used   Substance and Sexual Activity   • Alcohol use: No   • Drug use: No   • Sexual activity: Not Currently     Comment: No known STD risk factors       Current Outpatient Medications on File Prior to Visit   Medication Sig   • ALPRAZolam (XANAX) 0 5 mg tablet Take 1 tablet (0 5 mg total) by mouth 2 (two) times a day as needed for anxiety   • aspirin 81 MG tablet Take 81 mg by mouth daily   • B-D UF III MINI PEN NEEDLES 31G X 5 MM MISC    • BAQSIMI TWO PACK 3 MG/DOSE POWD Use as directed    • BD Insulin Syringe U/F 31G X 5/16\" 0 5 ML MISC 4 (four) times a day As directed   • cholecalciferol (VITAMIN D3) 1,000 units tablet Take 2,000 Units by mouth daily   • Coenzyme Q10 (Co Q-10) 200 MG CAPS Take by mouth in the morning   • diclofenac sodium (VOLTAREN) 1 % Apply 2 g topically 4 (four) times a day (Patient not taking: Reported on 4/10/2023)   • diltiazem (TIAZAC) 300 MG 24 hr capsule Take 300 mg by mouth daily   • DULoxetine (CYMBALTA) 60 mg delayed release capsule Take 1 capsule (60 mg total) by mouth daily   • fluticasone (FLONASE) 50 mcg/act nasal spray 1 spray into each nostril daily   • guaiFENesin (MUCINEX) 600 mg 12 hr tablet Take 2 tablets (1,200 mg total) by mouth every 12 (twelve) hours   • insulin aspart (NovoLOG) 100 units/mL injection Inject 12 Units " under the skin 3 (three) times a day before meals (Patient not taking: Reported on 4/28/2023)   • Insulin Disposable Pump (OmniPod Dash 5 Pack Pods) MISC USE 1 POD EVERY 1 5 DAYS   • irbesartan (AVAPRO) 300 mg tablet Take 300 mg by mouth   • isosorbide mononitrate (IMDUR) 60 mg 24 hr tablet TAKE 1 5 TABs AT NIGHT   • levothyroxine 50 mcg tablet Take 50 mcg by mouth daily   • naloxone (NARCAN) 4 mg/0 1 mL nasal spray Administer 1 spray into a nostril  If no response after 2-3 minutes, give another dose in the other nostril using a new spray  • nitroglycerin (NITROLINGUAL) 0 4 mg/spray spray USE ONE SPRAY Q 5 MINUTES AS NEEDED FOR CHEST PAIN  IF NO RELIEF, CALL 911  • NovoLOG 100 UNIT/ML SOLN INFUSE UNDER THE SKIN VIA INSULIN PUMP AS DIRECTED   TOTAL DAILY DOSE  UNITS   • rosuvastatin (CRESTOR) 20 MG tablet Take 20 mg by mouth every evening     • [DISCONTINUED] docusate sodium (COLACE) 100 mg capsule Take 1 capsule (100 mg total) by mouth 2 (two) times a day (Patient not taking: Reported on 4/10/2023)   • [DISCONTINUED] enalapril (VASOTEC) 20 mg tablet Take 20 mg by mouth daily at bedtime   (Patient not taking: Reported on 12/9/2022)   • [DISCONTINUED] furosemide (LASIX) 20 mg tablet Take 1 tablet (20 mg total) by mouth daily for 10 days (Patient not taking: Reported on 4/28/2023)   • [DISCONTINUED] Insulin Disposable Pump (OmniPod Dash 5 Pack Pods) MISC Omnipod Dash Insulin Pod subcutaneous cartridge   USE 1 POD EVERY 1 5 DAYS (Patient not taking: Reported on 4/28/2023)   • [DISCONTINUED] Lantus 100 UNIT/ML subcutaneous injection  (Patient not taking: Reported on 4/28/2023)   • [DISCONTINUED] molnupiravir (Lagevrio) 200 mg capsule Lagevrio 200 mg capsule (EUA)   TAKE 4 CAPSULES (800 MG TOTAL) BY MOUTH EVERY 12 HOURS FOR 5 DAYS (Patient not taking: Reported on 4/10/2023)   • [DISCONTINUED] traZODone (DESYREL) 50 mg tablet TAKE 0 5-1 TABLETS (25-50 MG TOTAL) BY MOUTH DAILY AT BEDTIME AS NEEDED FOR SLEEP (Patient not taking: Reported on 4/10/2023)     No current facility-administered medications on file prior to visit  Allergies   Allergen Reactions   • Molds & Smuts Allergic Rhinitis   • Other Allergic Rhinitis     RAGWEED, CAT DANDER, DOG DANDER    • Pollen Extract Other (See Comments)     Cold symptoms         Physical Exam:    /62   Pulse 73   Wt 90 3 kg (199 lb)   BMI 40 19 kg/m²     Constitutional:normal, well developed, well nourished, alert, in no distress and non-toxic and no overt pain behavior  and Morbidly obese  Eyes:anicteric  HEENT:grossly intact  Neck:supple, symmetric, trachea midline and no masses   Pulmonary:even and unlabored  Cardiovascular:No edema or pitting edema present  Skin:Normal without rashes or lesions and well hydrated  Psychiatric:Mood and affect appropriate  Neurologic:Cranial Nerves II-XII grossly intact  Musculoskeletal:antalgic and ambulates with cane     Lumbar Spine Exam  Appearance:  Normal lordosis  Palpation/Tenderness:  left lumbar paraspinal tenderness  right lumbar paraspinal tenderness  bilateral thoracic paraspinal spasm  Range of Motion:  Flexion: Moderately limited  with pain  Extension:  Moderately limited  with pain  Motor Strength:  Left hip flexion:  3/5  Left hip extension:  5/5  Right hip flexion:  4/5  Right hip extension:  5/5  Left knee flexion:  5/5  Left knee extension:  5/5  Right knee flexion:  5/5  Right knee extension:  5/5  Left foot dorsiflexion:  5/5  Left foot plantar flexion:  5/5  Right foot dorsiflexion:  5/5  Right foot plantar flexion:  5/5  Special Tests:  Left Straight Leg Test:  positive  Right Straight Leg Test:  positive      Imaging    MRI LUMBAR SPINE WITHOUT CONTRAST (6/27/2020)     INDICATION: M51 16: Intervertebral disc disorders with radiculopathy, lumbar region     Chronic low back pain with radiculopathy      COMPARISON:  11/30/2017      TECHNIQUE:  Sagittal T1, sagittal T2, sagittal inversion recovery, axial T1 and axial T2, coronal T2     IMAGE QUALITY:  Diagnostic     FINDINGS:     VERTEBRAL BODIES:  There is a transitional lumbosacral junction  There are 4 lumbar type vertebral bodies in the L5 vertebral body is sacralized  The vertebral bodies are numbered on series 3 image 9 for further reference  Normal alignment of the   lumbar spine  No spondylolysis or spondylolisthesis  No scoliosis  No compression fracture  Normal marrow signal is identified within the visualized bony structures  No discrete marrow lesion      SACRUM:  Normal signal within the sacrum  No evidence of insufficiency or stress fracture      DISTAL CORD AND CONUS:  Normal size and signal within the distal cord and conus      PARASPINAL SOFT TISSUES:  There are small renal cysts bilaterally  Mild atrophy of the posterior paraspinal musculature diffusely      LOWER THORACIC DISC SPACES:  At T11-12 there is an inferiorly extruded right paramedian disc herniation with mild annular bulging and asymmetric endplate hypertrophic changes  There is mild right lateral canal stenosis with distortion of the thecal sac   and mild right foraminal narrowing  This disc extrusion is slightly larger than the prior examination      LUMBAR DISC SPACES:     L1-L2:  Normal      L2-L3:  Disc desiccation without loss of disc height  Slight annular bulging without focal disc herniation  Mild canal stenosis  No foraminal nerve impingement      L3-L4:  Mild annular bulging with moderate facet degenerative change and ligamentum flavum thickening  Mild to moderate canal stenosis with slight distortion of the thecal sac  Mild bilateral foraminal narrowing      L4-L5:  Annular bulging with left-sided predominance  There is endplate hypertrophic osteophyte formation, also primarily left-sided  Mild facet degenerative change  There is mild canal stenosis    Mild bilateral foraminal narrowing      L5-S1:  The L5 vertebral body is fully sacralized with a small remote injury disc space between L5 and S1    No canal stenosis or foraminal narrowing

## 2023-06-12 ENCOUNTER — TELEPHONE (OUTPATIENT)
Dept: RADIOLOGY | Facility: CLINIC | Age: 78
End: 2023-06-12

## 2023-06-12 NOTE — TELEPHONE ENCOUNTER
Please review message below & advise about P2P:     Received: Today  Carloscassie Payne Edy  [facility]  For both CPT 09220 and 71559   The prior authorization request for this study has not been approved  You can schedule a peer to peer by calling Katia Stanley [phone]  Case# [id number] with the deadline date of 6/15/23  The insurance determined the following:     [x ] Does not meet Medical Necessity   [ ] No prior imaging   [ ] PT or conservative treatment incomplete   [ ] Missing Labs   [ ] Frequency     41398 :   It is requested for one of the following unsupported reasons    -Routine follow up    -To monitor your healing or recovery  One of the following must be met    -You have signs or symptoms in the region of your back or spine that your doctor   is requesting to image    -Results of the exam done by your doctor point to a problem in the requested   region of your spine  03696: It is requested for one of the following unsupported reasons    -Routine follow up    -To monitor your healing or recovery  Your doctor must have noted that you had muscle weakness graded three out of   five or less on an exam done by your doctor  This scale goes from grade five   being no weakness, to grade zero being severe weakness  If you choose not to complete a peer to peer then please reply to this message to let us know  Please notify your patient and contact central scheduling by calling [phone] to cancel the appointment and cancel the order in Epic

## 2023-06-12 NOTE — TELEPHONE ENCOUNTER
Weakness was noted at 3/5 as per the note in the physical exam section    Please resubmit the prior auth

## 2023-06-14 NOTE — TELEPHONE ENCOUNTER
I called and did the peer 2 peer yesterday which they said I couldn't do because it was past the deadline  After going through supervisors and finally speaking to a physician Dr Chaitanya Rendon, she said she would submit it for approval but it had to go through one more level with insurance    She did advise me that my note was not uploaded to their portal which is why it wasn't approved in the first place, which seemed odd to me because I ordered the MRI's

## 2023-06-14 NOTE — TELEPHONE ENCOUNTER
Received: Today  Corwin Snow  [facility]  I spoke to Geovani Alexis at [facility] physician support rep, she told me the p2p was consultive only and the denied decision can't be changed  She did show that an approval was recommended but again, since it was consult only, the two MRI are still denied  The next step would be a written appeal    How should we proceed with this patient since she is scheduled for tomorrow       Mekhi Pedraza

## 2023-06-14 NOTE — TELEPHONE ENCOUNTER
Still do not understand why it is being denied  She meets the criteria that the insurance required which was 6 weeks of physical therapy which she completed and weakness 3/5 on physical exam which she did have and is documented

## 2023-06-14 NOTE — TELEPHONE ENCOUNTER
Received: Today  Mehul Snow  [facility]  Hello, I'm following up on this patient's p2p request  Dr asked for her MRIs to be resubmitted but per Albertina, I cannot resubmit  for 60 days and there is no option left for reconsideration  She is scheduled for tomorrow   This is for both MRI cpt 93019 and [address]  Elijah Nava

## 2023-06-14 NOTE — TELEPHONE ENCOUNTER
Written appeal rec'd by NEVILLE  RN faxed to [employer] [facility] @ [phone]  Pt schd for MRI 6/15/23 @ 0246  Pls look for auth

## 2023-06-15 NOTE — TELEPHONE ENCOUNTER
S/w NICHOLE Snow, [employer] [facility] [place] appeal dept has not responded re: [facility]'s appeal letter  States she will keep clinical team updated with further information, pt will need to cancel MRI for 6/15 at 9:30 pm      RN s/w pt, verbalized understanding and confirmed she will cancel MRI for the time being  RN advised pt clinical team will update her if denial overturned with appeal letter so that she can r/s

## 2023-06-15 NOTE — TELEPHONE ENCOUNTER
F/u with NICHOLE Snow, states two attempts made on 6/15/23 to reach appeals dept, awaiting response

## 2023-06-19 DIAGNOSIS — F41.9 ANXIETY: ICD-10-CM

## 2023-06-19 DIAGNOSIS — F41.0 PANIC ATTACKS: ICD-10-CM

## 2023-06-19 DIAGNOSIS — F41.1 GAD (GENERALIZED ANXIETY DISORDER): ICD-10-CM

## 2023-06-20 RX ORDER — ALPRAZOLAM 0.5 MG/1
0.5 TABLET ORAL 2 TIMES DAILY PRN
Qty: 60 TABLET | Refills: 0 | Status: SHIPPED | OUTPATIENT
Start: 2023-06-20

## 2023-06-21 ENCOUNTER — TELEPHONE (OUTPATIENT)
Dept: NEPHROLOGY | Facility: CLINIC | Age: 78
End: 2023-06-21

## 2023-06-21 DIAGNOSIS — N18.4 CHRONIC RENAL IMPAIRMENT, STAGE 4 (SEVERE) (HCC): ICD-10-CM

## 2023-06-21 DIAGNOSIS — I10 ESSENTIAL HYPERTENSION: Chronic | ICD-10-CM

## 2023-06-21 DIAGNOSIS — N18.32 STAGE 3B CHRONIC KIDNEY DISEASE (HCC): Primary | ICD-10-CM

## 2023-06-21 DIAGNOSIS — E55.9 VITAMIN D DEFICIENCY: ICD-10-CM

## 2023-06-21 DIAGNOSIS — N18.4 CHRONIC KIDNEY DISEASE, STAGE 4 (SEVERE) (HCC): ICD-10-CM

## 2023-06-21 DIAGNOSIS — N28.89 KIDNEY MASS: ICD-10-CM

## 2023-06-21 NOTE — TELEPHONE ENCOUNTER
Called patient and asked they have the blood work drawn before the follow up apointment  Labs have been added to the patients chart

## 2023-06-21 NOTE — TELEPHONE ENCOUNTER
----- Message from Jimbo Ortega MD sent at 6/21/2023  3:44 PM EDT -----  BM P  ----- Message -----  From: Elby Canavan  Sent: 6/21/2023  12:28 PM EDT  To: Jimbo Ortega MD    What labs would you like drawn before the follow up?

## 2023-06-22 NOTE — TELEPHONE ENCOUNTER
RN sent referral message to NICHOLE Snow (MRI auth team) asking for update regarding appeal letter done by [facility] and if Ins has made a decision    Await response from MRI auth team

## 2023-06-27 ENCOUNTER — SOCIAL WORK (OUTPATIENT)
Dept: BEHAVIORAL/MENTAL HEALTH CLINIC | Facility: CLINIC | Age: 78
End: 2023-06-27
Payer: COMMERCIAL

## 2023-06-27 DIAGNOSIS — F41.9 ANXIETY: Primary | ICD-10-CM

## 2023-06-27 PROCEDURE — 90837 PSYTX W PT 60 MINUTES: CPT | Performed by: SOCIAL WORKER

## 2023-06-27 NOTE — PSYCH
"Behavioral Health Psychotherapy Progress Note    Psychotherapy Provided: Individual Psychotherapy     1  Anxiety            Goals addressed in session: Goal 1 Manage stress and anxiety    DATA: Isaura Sanches continues to deal with multiple physical health issues, issues related to her 's emotional and physical health, stressors related to her daughter and grandchildren and ongoing issues related to finances and her current home environment  Doing best she can to manage the stress but has been difficult  Mood has \"not been great\"  Limited energy, interest and motivation  Does not see her grandchildren as often as she would like  Family also very accustomed to bringing all of their struggles to her as has been the pattern for decades  Needs to limit this to a degree and continue to alter response to this stress  Denies any SI  Has multiple medical appts coming up and hopes to begin making progress in managing these issues  During this session, this clinician used the following therapeutic modalities: Solution-Focused Therapy and Supportive Psychotherapy    Substance Abuse was addressed during this session  If the client is diagnosed with a co-occurring substance use disorder, please indicate any changes in the frequency or amount of use: none  Stage of change for addressing substance use diagnoses: No substance use/Not applicable    ASSESSMENT:  Pati Enriquez presents with a Anxious and Dysthymic mood  her affect is Normal range and intensity and Flat, which is congruent, with her mood and the content of the session  The client has not made progress on their goals  Pati Enriquez presents with a minimal risk of suicide, minimal risk of self-harm, and minimal risk of harm to others  For any risk assessment that surpasses a \"low\" rating, a safety plan must be developed      A safety plan was indicated: no  If yes, describe in detail n/a    PLAN: Between sessions, Pati Enriquez will maintain some " more consistent and rigid boundaries with family as reviewed during session  Reviewed stress mgmt and relaxation strategies to utilize in response  At the next session, the therapist will use Solution-Focused Therapy and Supportive Psychotherapy to address anxiety/depression  Behavioral Health Treatment Plan and Discharge Planning: Ivon Payan is aware of and agrees to continue to work on their treatment plan  They have identified and are working toward their discharge goals   no    Visit start and stop times: 4:05-5:15    06/27/23

## 2023-06-28 ENCOUNTER — APPOINTMENT (OUTPATIENT)
Dept: LAB | Facility: CLINIC | Age: 78
End: 2023-06-28
Payer: COMMERCIAL

## 2023-06-28 DIAGNOSIS — N28.89 KIDNEY MASS: ICD-10-CM

## 2023-06-28 DIAGNOSIS — I10 ESSENTIAL HYPERTENSION: Chronic | ICD-10-CM

## 2023-06-28 DIAGNOSIS — E55.9 VITAMIN D DEFICIENCY: ICD-10-CM

## 2023-06-28 DIAGNOSIS — N18.4 CHRONIC RENAL IMPAIRMENT, STAGE 4 (SEVERE) (HCC): ICD-10-CM

## 2023-06-28 DIAGNOSIS — N18.4 CHRONIC KIDNEY DISEASE, STAGE 4 (SEVERE) (HCC): ICD-10-CM

## 2023-06-28 DIAGNOSIS — N18.32 STAGE 3B CHRONIC KIDNEY DISEASE (HCC): ICD-10-CM

## 2023-06-28 LAB
ANION GAP SERPL CALCULATED.3IONS-SCNC: 8 MMOL/L
BUN SERPL-MCNC: 20 MG/DL (ref 5–25)
CALCIUM SERPL-MCNC: 9.4 MG/DL (ref 8.4–10.2)
CHLORIDE SERPL-SCNC: 107 MMOL/L (ref 96–108)
CO2 SERPL-SCNC: 22 MMOL/L (ref 21–32)
CREAT SERPL-MCNC: 1.58 MG/DL (ref 0.6–1.3)
GFR SERPL CREATININE-BSD FRML MDRD: 31 ML/MIN/1.73SQ M
GLUCOSE SERPL-MCNC: 215 MG/DL (ref 65–140)
POTASSIUM SERPL-SCNC: 4.1 MMOL/L (ref 3.5–5.3)
SODIUM SERPL-SCNC: 137 MMOL/L (ref 135–147)

## 2023-06-28 PROCEDURE — 80048 BASIC METABOLIC PNL TOTAL CA: CPT

## 2023-06-28 PROCEDURE — 36415 COLL VENOUS BLD VENIPUNCTURE: CPT

## 2023-06-29 ENCOUNTER — OFFICE VISIT (OUTPATIENT)
Dept: NEPHROLOGY | Facility: CLINIC | Age: 78
End: 2023-06-29
Payer: COMMERCIAL

## 2023-06-29 VITALS
SYSTOLIC BLOOD PRESSURE: 140 MMHG | HEART RATE: 80 BPM | BODY MASS INDEX: 39.72 KG/M2 | WEIGHT: 197 LBS | DIASTOLIC BLOOD PRESSURE: 70 MMHG | HEIGHT: 59 IN

## 2023-06-29 DIAGNOSIS — N18.9 CHRONIC RENAL IMPAIRMENT, UNSPECIFIED CKD STAGE: ICD-10-CM

## 2023-06-29 DIAGNOSIS — R80.9 MICROALBUMINURIA: ICD-10-CM

## 2023-06-29 DIAGNOSIS — N18.32 STAGE 3B CHRONIC KIDNEY DISEASE (HCC): ICD-10-CM

## 2023-06-29 DIAGNOSIS — R80.8 OTHER PROTEINURIA: Primary | ICD-10-CM

## 2023-06-29 PROBLEM — R79.89 ELEVATED SERUM CREATININE: Status: RESOLVED | Noted: 2022-11-01 | Resolved: 2023-06-29

## 2023-06-29 PROBLEM — N18.4 CHRONIC KIDNEY DISEASE, STAGE 4 (SEVERE) (HCC): Status: RESOLVED | Noted: 2023-01-22 | Resolved: 2023-06-29

## 2023-06-29 NOTE — PROGRESS NOTES
NEPHROLOGY PROGRESS NOTE    Deland Schwab 69 y o  female MRN: [id number]  Unit/Bed#:  Encounter: [id number]  Reason for Consult: Renal insufficiency and microalbuminuria    The patient is here for follow-up I have not seen her in a couple years she states that she made the call herself just because she wanted to check on her kidney function and make sure everything has been stable  Her biggest complaints are arthralgias and back pain that she has been dealing with  ASSESSMENT/PLAN:  1  Renal    The patient has chronic renal disease likely due to nephrosclerosis and not diabetic nephropathy  Her creatinine is stable at 1 5 with no significant progression over the last couple years and urine microalbumin screen showed 117 mg estimation  This tells me she does not have overt diabetic nephropathy because she still has very low levels of albumin excretion and also that tells me diabetic nephropathy is not responsible for her elevated creatinine as you generally do not see declining kidney function until there is heavy proteinuria  I explained this in detail the patient she has been very stable clinically  Her latest hemoglobin A1c had significantly increased and she states she is going to be starting Ozempic in the near future  For now I told her to work on glycemic control to help avoid diabetic complications in the future  Her blood pressure is excellent  Continue her current medications  2  Renal neoplasm  A CAT scan showed a 1 cm right kidney cortical lesion that did not represent a simple cyst   She was seen by urology and it appears there is an ultrasound ordered  I forwarded a message to her urologist as the patient states she really did not know the plan and she was concerned about this    I told her there was an ultrasound ordered she did not know when that was to be done but I "messaged her urologist to reach out to her  Urine protein estimation and renal function if there is ever a problem in the future please feel free to give me a call or have her come back Given that things have remained stable after my discussion the patient felt that she would just return to follow-up with her family physician who is doing an excellent job monitoringand she was in agreement with this and it was her wish  Review of Systems   Constitutional: Negative for chills, decreased appetite, diaphoresis and fever  HENT: Negative  Eyes: Negative  Cardiovascular: Negative for chest pain, dyspnea on exertion, orthopnea and palpitations  Respiratory: Negative  Negative for cough, shortness of breath, sputum production and wheezing  Musculoskeletal: Positive for arthritis and back pain  Gastrointestinal: Negative for abdominal pain, diarrhea, nausea and vomiting  Genitourinary: Negative  Neurological: Negative for dizziness, focal weakness, headaches and weakness  Psychiatric/Behavioral: Negative for altered mental status, hallucinations and hypervigilance  The patient is not nervous/anxious  OBJECTIVE:  Current Weight: Weight - Scale: 89 4 kg (197 lb)  [email or web address]     Height 4' 11\" (1 499 m), weight 89 4 kg (197 lb)  , Body mass index is 39 79 kg/m²  [unfilled]    Physical Exam: Ht 4' 11\" (1 499 m)   Wt 89 4 kg (197 lb)   BMI 39 79 kg/m²   Physical Exam  Constitutional:       General: She is not in acute distress  Appearance: She is not ill-appearing or toxic-appearing  HENT:      Head: Normocephalic and atraumatic  Nose: Nose normal       Mouth/Throat:      Mouth: Mucous membranes are moist    Eyes:      General: No scleral icterus  Extraocular Movements: Extraocular movements intact  Cardiovascular:      Rate and Rhythm: Normal rate and regular rhythm  Heart sounds: No friction rub  No gallop     Pulmonary:      Effort: Pulmonary " "effort is normal  No respiratory distress  Breath sounds: No wheezing, rhonchi or rales  Abdominal:      General: Bowel sounds are normal  There is no distension  Palpations: Abdomen is soft  Tenderness: There is no abdominal tenderness  There is no rebound  Musculoskeletal:      Cervical back: Normal range of motion and neck supple  Neurological:      General: No focal deficit present  Mental Status: She is alert and oriented to person, place, and time  Mental status is at baseline  Psychiatric:         Mood and Affect: Mood normal          Behavior: Behavior normal          Thought Content:  Thought content normal          Judgment: Judgment normal          Medications:    Current Outpatient Medications:   •  ALPRAZolam (XANAX) 0 5 mg tablet, Take 1 tablet (0 5 mg total) by mouth 2 (two) times a day as needed for anxiety, Disp: 60 tablet, Rfl: 0  •  aspirin 81 MG tablet, Take 81 mg by mouth daily, Disp: , Rfl:   •  B-D UF III MINI PEN NEEDLES 31G X 5 MM MISC, , Disp: , Rfl:   •  BAQSIMI TWO PACK 3 MG/DOSE POWD, Use as directed , Disp: , Rfl: 0  •  BD Insulin Syringe U/F 31G X 5/16\" 0 5 ML MISC, 4 (four) times a day As directed, Disp: , Rfl:   •  cholecalciferol (VITAMIN D3) 1,000 units tablet, Take 2,000 Units by mouth daily, Disp: , Rfl:   •  Coenzyme Q10 (Co Q-10) 200 MG CAPS, Take by mouth in the morning, Disp: , Rfl:   •  diclofenac sodium (VOLTAREN) 1 %, Apply 2 g topically 4 (four) times a day (Patient not taking: Reported on 4/10/2023), Disp: 1 Tube, Rfl: 0  •  diltiazem (TIAZAC) 300 MG 24 hr capsule, Take 300 mg by mouth daily, Disp: , Rfl:   •  DULoxetine (CYMBALTA) 60 mg delayed release capsule, Take 1 capsule (60 mg total) by mouth daily, Disp: 90 capsule, Rfl: 3  •  fluticasone (FLONASE) 50 mcg/act nasal spray, 1 spray into each nostril daily, Disp: 9 9 mL, Rfl: 0  •  guaiFENesin (MUCINEX) 600 mg 12 hr tablet, Take 2 tablets (1,200 mg total) by mouth every 12 (twelve) hours, " "Disp: 30 tablet, Rfl: 0  •  insulin aspart (NovoLOG) 100 units/mL injection, Inject 12 Units under the skin 3 (three) times a day before meals (Patient not taking: Reported on 4/28/2023), Disp: , Rfl: 0  •  Insulin Disposable Pump (OmniPod Dash 5 Pack Pods) MISC, USE 1 POD EVERY 1 5 DAYS, Disp: , Rfl:   •  irbesartan (AVAPRO) 300 mg tablet, Take 300 mg by mouth, Disp: , Rfl:   •  isosorbide mononitrate (IMDUR) 60 mg 24 hr tablet, TAKE 1 5 TABs AT NIGHT, Disp: , Rfl:   •  levothyroxine 50 mcg tablet, Take 50 mcg by mouth daily, Disp: , Rfl:   •  naloxone (NARCAN) 4 mg/0 1 mL nasal spray, Administer 1 spray into a nostril  If no response after 2-3 minutes, give another dose in the other nostril using a new spray , Disp: 1 each, Rfl: 1  •  nitroglycerin (NITROLINGUAL) 0 4 mg/spray spray, USE ONE SPRAY Q 5 MINUTES AS NEEDED FOR CHEST PAIN  IF NO RELIEF, CALL 911 , Disp: , Rfl: 1  •  NovoLOG 100 UNIT/ML SOLN, INFUSE UNDER THE SKIN VIA INSULIN PUMP AS DIRECTED   TOTAL DAILY DOSE  UNITS, Disp: , Rfl:   •  rosuvastatin (CRESTOR) 20 MG tablet, Take 20 mg by mouth every evening  , Disp: , Rfl: 2    Laboratory Results:  Lab Results   Component Value Date    WBC 11 71 (H) 10/27/2022    HGB 13 1 10/27/2022    HCT 40 9 10/27/2022    MCV 82 10/27/2022     10/27/2022     Lab Results   Component Value Date    SODIUM 137 06/28/2023    K 4 1 06/28/2023     06/28/2023    CO2 22 06/28/2023    BUN 20 06/28/2023    CREATININE 1 58 (H) 06/28/2023    GLUC 215 (H) 06/28/2023    CALCIUM 9 4 06/28/2023     Lab Results   Component Value Date    CALCIUM 9 4 06/28/2023    PHOS 3 4 11/11/2020     No results found for: \"LABPROT\"    "

## 2023-06-29 NOTE — LETTER
2023     Danny Kelley  [address] [facility] [address]    Patient: Carlos Eduardo Monique   YOB: 1945   Date of Visit: 2023       Dear Dr Benz Hum: Thank you for referring Carlos Eduardo Monique to me for evaluation  Below are my notes for this consultation  If you have questions, please do not hesitate to call me  I look forward to following your patient along with you  Sincerely,        Tammy Null MD        CC: MD Tammy Razo MD  2023  4:25 PM  Sign when Signing Visit  NEPHROLOGY PROGRESS NOTE    Carlos Eduardo Monique 69 y o  female MRN: [id number]  Unit/Bed#:  Encounter: [id number]  Reason for Consult: Renal insufficiency and microalbuminuria    The patient is here for follow-up I have not seen her in a couple years she states that she made the call herself just because she wanted to check on her kidney function and make sure everything has been stable  Her biggest complaints are arthralgias and back pain that she has been dealing with  ASSESSMENT/PLAN:  1  Renal    The patient has chronic renal disease likely due to nephrosclerosis and not diabetic nephropathy  Her creatinine is stable at 1 5 with no significant progression over the last couple years and urine microalbumin screen showed 117 mg estimation  This tells me she does not have overt diabetic nephropathy because she still has very low levels of albumin excretion and also that tells me diabetic nephropathy is not responsible for her elevated creatinine as you generally do not see declining kidney function until there is heavy proteinuria  I explained this in detail the patient she has been very stable clinically  Her latest hemoglobin A1c had significantly increased and she states she is going to be starting Ozempic in the near future  For now I told her to work on glycemic control to help avoid diabetic complications in the future      Her blood pressure is "excellent  Continue her current medications  2  Renal neoplasm  A CAT scan showed a 1 cm right kidney cortical lesion that did not represent a simple cyst   She was seen by urology and it appears there is an ultrasound ordered  I forwarded a message to her urologist as the patient states she really did not know the plan and she was concerned about this  I told her there was an ultrasound ordered she did not know when that was to be done but I messaged her urologist to reach out to her  Urine protein estimation and renal function if there is ever a problem in the future please feel free to give me a call or have her come back Given that things have remained stable after my discussion the patient felt that she would just return to follow-up with her family physician who is doing an excellent job monitoringand she was in agreement with this and it was her wish  Review of Systems   Constitutional: Negative for chills, decreased appetite, diaphoresis and fever  HENT: Negative  Eyes: Negative  Cardiovascular: Negative for chest pain, dyspnea on exertion, orthopnea and palpitations  Respiratory: Negative  Negative for cough, shortness of breath, sputum production and wheezing  Musculoskeletal: Positive for arthritis and back pain  Gastrointestinal: Negative for abdominal pain, diarrhea, nausea and vomiting  Genitourinary: Negative  Neurological: Negative for dizziness, focal weakness, headaches and weakness  Psychiatric/Behavioral: Negative for altered mental status, hallucinations and hypervigilance  The patient is not nervous/anxious  OBJECTIVE:  Current Weight: Weight - Scale: 89 4 kg (197 lb)  [email or web address]:     Height 4' 11\" (1 499 m), weight 89 4 kg (197 lb)  , Body mass index is 39 79 kg/m²      [unfilled]    Physical Exam: Ht 4' 11\" (1 499 m)   Wt 89 4 " "kg (197 lb)   BMI 39 79 kg/m²   Physical Exam  Constitutional:       General: She is not in acute distress  Appearance: She is not ill-appearing or toxic-appearing  HENT:      Head: Normocephalic and atraumatic  Nose: Nose normal       Mouth/Throat:      Mouth: Mucous membranes are moist    Eyes:      General: No scleral icterus  Extraocular Movements: Extraocular movements intact  Cardiovascular:      Rate and Rhythm: Normal rate and regular rhythm  Heart sounds: No friction rub  No gallop  Pulmonary:      Effort: Pulmonary effort is normal  No respiratory distress  Breath sounds: No wheezing, rhonchi or rales  Abdominal:      General: Bowel sounds are normal  There is no distension  Palpations: Abdomen is soft  Tenderness: There is no abdominal tenderness  There is no rebound  Musculoskeletal:      Cervical back: Normal range of motion and neck supple  Neurological:      General: No focal deficit present  Mental Status: She is alert and oriented to person, place, and time  Mental status is at baseline  Psychiatric:         Mood and Affect: Mood normal          Behavior: Behavior normal          Thought Content:  Thought content normal          Judgment: Judgment normal          Medications:    Current Outpatient Medications:   •  ALPRAZolam (XANAX) 0 5 mg tablet, Take 1 tablet (0 5 mg total) by mouth 2 (two) times a day as needed for anxiety, Disp: 60 tablet, Rfl: 0  •  aspirin 81 MG tablet, Take 81 mg by mouth daily, Disp: , Rfl:   •  B-D UF III MINI PEN NEEDLES 31G X 5 MM MISC, , Disp: , Rfl:   •  BAQSIMI TWO PACK 3 MG/DOSE POWD, Use as directed , Disp: , Rfl: 0  •  BD Insulin Syringe U/F 31G X 5/16\" 0 5 ML MISC, 4 (four) times a day As directed, Disp: , Rfl:   •  cholecalciferol (VITAMIN D3) 1,000 units tablet, Take 2,000 Units by mouth daily, Disp: , Rfl:   •  Coenzyme Q10 (Co Q-10) 200 MG CAPS, Take by mouth in the morning, Disp: , Rfl:   •  diclofenac " sodium (VOLTAREN) 1 %, Apply 2 g topically 4 (four) times a day (Patient not taking: Reported on 4/10/2023), Disp: 1 Tube, Rfl: 0  •  diltiazem (TIAZAC) 300 MG 24 hr capsule, Take 300 mg by mouth daily, Disp: , Rfl:   •  DULoxetine (CYMBALTA) 60 mg delayed release capsule, Take 1 capsule (60 mg total) by mouth daily, Disp: 90 capsule, Rfl: 3  •  fluticasone (FLONASE) 50 mcg/act nasal spray, 1 spray into each nostril daily, Disp: 9 9 mL, Rfl: 0  •  guaiFENesin (MUCINEX) 600 mg 12 hr tablet, Take 2 tablets (1,200 mg total) by mouth every 12 (twelve) hours, Disp: 30 tablet, Rfl: 0  •  insulin aspart (NovoLOG) 100 units/mL injection, Inject 12 Units under the skin 3 (three) times a day before meals (Patient not taking: Reported on 4/28/2023), Disp: , Rfl: 0  •  Insulin Disposable Pump (OmniPod Dash 5 Pack Pods) MISC, USE 1 POD EVERY 1 5 DAYS, Disp: , Rfl:   •  irbesartan (AVAPRO) 300 mg tablet, Take 300 mg by mouth, Disp: , Rfl:   •  isosorbide mononitrate (IMDUR) 60 mg 24 hr tablet, TAKE 1 5 TABs AT NIGHT, Disp: , Rfl:   •  levothyroxine 50 mcg tablet, Take 50 mcg by mouth daily, Disp: , Rfl:   •  naloxone (NARCAN) 4 mg/0 1 mL nasal spray, Administer 1 spray into a nostril  If no response after 2-3 minutes, give another dose in the other nostril using a new spray , Disp: 1 each, Rfl: 1  •  nitroglycerin (NITROLINGUAL) 0 4 mg/spray spray, USE ONE SPRAY Q 5 MINUTES AS NEEDED FOR CHEST PAIN  IF NO RELIEF, CALL 911 , Disp: , Rfl: 1  •  NovoLOG 100 UNIT/ML SOLN, INFUSE UNDER THE SKIN VIA INSULIN PUMP AS DIRECTED   TOTAL DAILY DOSE  UNITS, Disp: , Rfl:   •  rosuvastatin (CRESTOR) 20 MG tablet, Take 20 mg by mouth every evening  , Disp: , Rfl: 2    Laboratory Results:  Lab Results   Component Value Date    WBC 11 71 (H) 10/27/2022    HGB 13 1 10/27/2022    HCT 40 9 10/27/2022    MCV 82 10/27/2022     10/27/2022     Lab Results   Component Value Date    SODIUM 137 06/28/2023    K 4 1 06/28/2023     06/28/2023 "   CO2 22 06/28/2023    BUN 20 06/28/2023    CREATININE 1 58 (H) 06/28/2023    GLUC 215 (H) 06/28/2023    CALCIUM 9 4 06/28/2023     Lab Results   Component Value Date    CALCIUM 9 4 06/28/2023    PHOS 3 4 11/11/2020     No results found for: \"LABPROT\"    "

## 2023-06-29 NOTE — PATIENT INSTRUCTIONS
You are here for follow-up I have not seen you in a couple years it was nice to see you again and review everything that is going on I am sorry that your back is painful but hopefully now he can get the testing done they can do something to help you  With respect your kidney function the creatinine level is 1 5 and I showed you going back a few years it stable with no worsening which is good  Also the amount of protein in your urine is very low so your renal dysfunction is not from diabetic kidney disease it is practically from aging or nephrosclerosis because you do not have a lot of protein in the urine which is a good thing  You are working on trying to improve your sugar control with your endocrinologist to help reduce the risk of diabetic complications  As we discussed they discovered a small growth on your kidney and I am just going to touch base with urology so they know that you are concerned and want to know when follow-up is and they should reach out to you  Please call me if there is any problems or concerns and I will let your primary doctor know to continue to watch and if there is ever a problem in the future I will be here for you

## 2023-07-17 ENCOUNTER — HOSPITAL ENCOUNTER (OUTPATIENT)
Dept: MRI IMAGING | Facility: HOSPITAL | Age: 78
Discharge: HOME | End: 2023-07-17
Attending: ANESTHESIOLOGY
Payer: COMMERCIAL

## 2023-07-17 DIAGNOSIS — M51.16 INTERVERTEBRAL DISC DISORDER WITH RADICULOPATHY OF LUMBAR REGION: ICD-10-CM

## 2023-07-17 DIAGNOSIS — M51.14 INTERVERTEBRAL DISC DISORDERS WITH RADICULOPATHY, THORACIC REGION: ICD-10-CM

## 2023-07-17 PROCEDURE — 72148 MRI LUMBAR SPINE W/O DYE: CPT

## 2023-07-17 PROCEDURE — G1004 CDSM NDSC: HCPCS

## 2023-07-17 PROCEDURE — 72146 MRI CHEST SPINE W/O DYE: CPT

## 2023-07-19 DIAGNOSIS — F41.9 ANXIETY: ICD-10-CM

## 2023-07-19 DIAGNOSIS — F41.1 GAD (GENERALIZED ANXIETY DISORDER): ICD-10-CM

## 2023-07-19 DIAGNOSIS — F41.0 PANIC ATTACKS: ICD-10-CM

## 2023-07-20 ENCOUNTER — TELEPHONE (OUTPATIENT)
Dept: RADIOLOGY | Facility: CLINIC | Age: 78
End: 2023-07-20

## 2023-07-20 RX ORDER — ALPRAZOLAM 0.5 MG/1
0.5 TABLET ORAL 2 TIMES DAILY PRN
Qty: 60 TABLET | Refills: 0 | Status: SHIPPED | OUTPATIENT
Start: 2023-07-20

## 2023-07-20 NOTE — TELEPHONE ENCOUNTER
Pt informed of results of lumbar and thoracic spine as per task. Pt told to call the neurosurgery office and set up an appt per rec from .

## 2023-07-20 NOTE — TELEPHONE ENCOUNTER
Please let patient MRI lumbar spine was performed and it shows stable disc herniations and facet arthrosis which is leading to multilevel moderate stenosis at L2-3 L3-4 and L4-5. MRI thoracic spine redemonstrates the disc herniation T11-12. She should make a follow-up appointment with neurosurgery now that her updated MRIs are completed.

## 2023-07-25 RX ORDER — PSEUDOEPHEDRINE HCL 30 MG
1 TABLET ORAL EVERY 12 HOURS
COMMUNITY

## 2023-07-25 RX ORDER — SEMAGLUTIDE 0.68 MG/ML
INJECTION, SOLUTION SUBCUTANEOUS
COMMUNITY
Start: 2023-06-15

## 2023-07-25 RX ORDER — FUROSEMIDE 20 MG/1
TABLET ORAL
COMMUNITY

## 2023-07-25 RX ORDER — FLUOCINONIDE TOPICAL SOLUTION USP, 0.05% 0.5 MG/ML
SOLUTION TOPICAL
COMMUNITY

## 2023-07-28 ENCOUNTER — OFFICE VISIT (OUTPATIENT)
Dept: INTERNAL MEDICINE CLINIC | Facility: CLINIC | Age: 78
End: 2023-07-28
Payer: COMMERCIAL

## 2023-07-28 ENCOUNTER — TELEPHONE (OUTPATIENT)
Dept: INTERNAL MEDICINE CLINIC | Facility: CLINIC | Age: 78
End: 2023-07-28

## 2023-07-28 VITALS
BODY MASS INDEX: 40.72 KG/M2 | WEIGHT: 202 LBS | HEART RATE: 62 BPM | HEIGHT: 59 IN | OXYGEN SATURATION: 96 % | DIASTOLIC BLOOD PRESSURE: 72 MMHG | SYSTOLIC BLOOD PRESSURE: 130 MMHG

## 2023-07-28 DIAGNOSIS — Z12.31 ENCOUNTER FOR SCREENING MAMMOGRAM FOR BREAST CANCER: ICD-10-CM

## 2023-07-28 DIAGNOSIS — F41.9 ANXIETY: ICD-10-CM

## 2023-07-28 DIAGNOSIS — E78.2 MIXED HYPERLIPIDEMIA: Chronic | ICD-10-CM

## 2023-07-28 DIAGNOSIS — N28.1 KIDNEY CYSTS: ICD-10-CM

## 2023-07-28 DIAGNOSIS — R16.0 ENLARGED LIVER: Primary | ICD-10-CM

## 2023-07-28 DIAGNOSIS — I10 ESSENTIAL HYPERTENSION: Chronic | ICD-10-CM

## 2023-07-28 DIAGNOSIS — E11.8 TYPE 2 DIABETES MELLITUS WITH COMPLICATION, WITH LONG-TERM CURRENT USE OF INSULIN (HCC): ICD-10-CM

## 2023-07-28 DIAGNOSIS — I82.4Y3 DEEP VEIN THROMBOSIS (DVT) OF PROXIMAL VEIN OF BOTH LOWER EXTREMITIES, UNSPECIFIED CHRONICITY (HCC): ICD-10-CM

## 2023-07-28 DIAGNOSIS — Z79.4 TYPE 2 DIABETES MELLITUS WITH COMPLICATION, WITH LONG-TERM CURRENT USE OF INSULIN (HCC): ICD-10-CM

## 2023-07-28 DIAGNOSIS — Z13.820 SCREENING FOR OSTEOPOROSIS: ICD-10-CM

## 2023-07-28 PROCEDURE — 99214 OFFICE O/P EST MOD 30 MIN: CPT | Performed by: INTERNAL MEDICINE

## 2023-07-28 NOTE — TELEPHONE ENCOUNTER
This patient is to see you in 3 months. She will only see you and your schedule is full. I offered an appointment with you and a resident but she refused. She said she had a terrible experience with one of our prior residents. Is there any where you can fit her in? Please advise.  Thank you Repair Type: Intermediate Layered Repair

## 2023-07-28 NOTE — PROGRESS NOTES
Assessment/Plan:    Enlarged liver  Suspect fatty liver we will check ultrasound elastography recommend weight loss recommend improving hemoglobin A1c avoidance of alcohol    Type 2 diabetes mellitus with complication, with long-term current use of insulin (HCC)    Lab Results   Component Value Date    HGBA1C 10.4 (H) 06/13/2023   I have counselled the pt to follow a healthy and balanced diet ,and recommend routine exercise. I will be ordering diabetic laboratories including comprehensive metabolic panel, hemoglobin A1c, urine microalbumin, lipid panel. Target A1c under 7 recommend to start the semaglutide    Essential hypertension  Hypertension - controlled, I have counseled patient following healthy balance diet, I would like the patient reduce sodium, exercise routinely, I would like the patient continued the med current medical regiment and we will continue to monitor. Blood pressure today 130/72 stable and doing well    Screening for osteoporosis  We will check DEXA scan    Hyperlipidemia  Hyperlipidemia controlled continue with current medical regiment recommend a low-cholesterol diet and recommend routine exercise we will continue to monitor the progress.   Continue Crestor 20 mg once daily    Anxiety  Stable but ongoing symptoms no SI continue Xanax 0.5 mg twice daily as needed, Cymbalta 60 mg once daily    Kidney cysts  Patient has ultrasound ordered [facility]rology patient to follow-up with urology         Problem List Items Addressed This Visit        Digestive    Enlarged liver - Primary     Suspect fatty liver we will check ultrasound elastography recommend weight loss recommend improving hemoglobin A1c avoidance of alcohol         Relevant Orders    US elastography       Endocrine    Type 2 diabetes mellitus with complication, with long-term current use of insulin (HCC) (Chronic)       Lab Results   Component Value Date    HGBA1C 10.4 (H) 06/13/2023   I have counselled the pt to follow a healthy and balanced diet ,and recommend routine exercise. I will be ordering diabetic laboratories including comprehensive metabolic panel, hemoglobin A1c, urine microalbumin, lipid panel. Target A1c under 7 recommend to start the semaglutide         Relevant Medications    Ozempic, 0.25 or 0.5 MG/DOSE, 2 MG/3ML injection pen    Other Relevant Orders    Hemoglobin A1C    Comprehensive metabolic panel    Albumin / creatinine urine ratio    Hemoglobin A1c (w/out EAG) ([facility] ONLY)    Comprehensive metabolic panel       Cardiovascular and Mediastinum    Essential hypertension (Chronic)     Hypertension - controlled, I have counseled patient following healthy balance diet, I would like the patient reduce sodium, exercise routinely, I would like the patient continued the med current medical regiment and we will continue to monitor. Blood pressure today 130/72 stable and doing well         Relevant Medications    furosemide (LASIX) 20 mg tablet    RESOLVED: DVT (deep venous thrombosis) (HCC)       Genitourinary    Kidney cysts     Patient has ultrasound ordered [place] [facility] patient to follow-up with urology         Relevant Medications    furosemide (LASIX) 20 mg tablet       Other    Hyperlipidemia (Chronic)     Hyperlipidemia controlled continue with current medical regiment recommend a low-cholesterol diet and recommend routine exercise we will continue to monitor the progress. Continue Crestor 20 mg once daily         Encounter for screening mammogram for breast cancer    Relevant Orders    Mammo screening bilateral w 3d & cad    Anxiety     Stable but ongoing symptoms no SI continue Xanax 0.5 mg twice daily as needed, Cymbalta 60 mg once daily         Screening for osteoporosis     We will check DEXA scan         Relevant Orders    DXA bone density spine hip and pelvis       Return to office 4  months  call if any problems  Subjective:      Patient ID: Alexandria Leyva is a 69 y.o. female.     HPI 74-year old female coming in for a follow up office visit regarding type 2 diabetes, enlarged liver, hypertension and hyperlipidemia; the patient reports me compliant taking medications without untoward side effects the. The patient is here to review his medical condition, update me on the medical condition and the patient reports me no hospitalizations and no ER visits. Patient recently has been to endocrinologist Dr. Jacquie Canales he had started her on Ozempic but patient has not started at this point time they did have a conversation with her on the importance and how this could be very helpful to help lower her hemoglobin A1c. Patient also reports me she has been working with pain management regarding lower back pain a recent MRI was obtained patient interested for me to review with her in detail I did review this showing lumbar stenosis and she will follow-up with pain management for additional treatment modalities but it also did show hepatomegaly and small bilateral renal cortical cysts. The patient will be working with urology regarding the renal cyst.  She is unaware of having enlarged liver in the past she does not drink alcohol. The following portions of the patient's history were reviewed and updated as appropriate: allergies, current medications, past family history, past medical history, past social history, past surgical history and problem list.    Review of Systems   Constitutional: Negative for activity change, appetite change and unexpected weight change. HENT: Negative for congestion and postnasal drip. Eyes: Negative for visual disturbance. Respiratory: Negative for cough and shortness of breath. Cardiovascular: Negative for chest pain. Gastrointestinal: Negative for abdominal pain, diarrhea, nausea and vomiting. Neurological: Negative for dizziness, light-headedness and headaches. Hematological: Negative for adenopathy. Psychiatric/Behavioral: Negative for suicidal ideas. The patient is nervous/anxious. Lower back pain chronic    Objective:    Return in about 4 months (around 11/28/2023). Procedure: CARD VASCULAR CAROTID DUPLEX BILATERAL    Result Date: 7/23/2023  Narrative: This result has an attachment that is not available. Right: Mild to moderate calcific plaque throughout the RT CCA, bulb and ICA Less than 50% stenosis in the RT ICA RT ICA/CCA ratio 1.0, no significant change since prev-1.1 RT vertebral is antegrade Mild heterogeneous plaque in the RT subclavian without significant stenosis Left: Mild calcific plaque in the LT bulb and ICA Less than 50% stenosis in the LT ICA LT ICA/CCA ratio 1.1, prev- 1.3 LT vertebral is antegrade Mild heterogeneous plaque in the LT subclavian without significant stenosis Technical Quality Overall the study quality was good. Right Carotid The right brachial BP is 140/62. The right proximal common carotid artery has mild calcific plaque. The right middle common carotid artery has moderate calcific plaque. The right distal common carotid artery has moderate calcific plaque. The right proximal internal carotid artery demonstrates <50% stenosis. The right proximal internal carotid artery has mild calcific plaque. The right middle internal carotid artery has no significant plaque. The right distal internal carotid artery has no significant plaque. The right external carotid artery demonstrates no significant stenosis. The right external carotid artery has no significant plaque. The flow of the right vertebral artery is antegrade. The flow of the right subclavian artery is triphasic. The right subclavian artery has mild heterogeneous plaque. The right bulb of internal carotid artery has moderate calcific plaque. Left Carotid The left brachial BP is 140/62. The left proximal common carotid artery has no significant plaque. The left middle common carotid artery has no significant plaque. The left distal common carotid artery has no significant plaque.  The left proximal internal carotid artery demonstrates <50% stenosis. The left proximal internal carotid artery has mild heterogeneous and mild calcific plaque. The left middle internal carotid artery has no significant plaque. The left distal internal carotid artery has no significant plaque. The left external carotid artery demonstrates no significant stenosis. The left external carotid artery has no significant plaque. The flow of the left vertebral artery is antegrade. The flow of the left subclavian artery is triphasic. The left subclavian artery has mild heterogeneous plaque. The left bulb of internal carotid artery has moderate calcific plaque. Procedure: MRI thoracic spine wo contrast    Result Date: 7/20/2023  Narrative: MRI THORACIC SPINE WITHOUT CONTRAST INDICATION: M51.14: Intervertebral disc disorders with radiculopathy, thoracic region. COMPARISON: MRI thoracic spine 6/24/2004. MRI lumbar spine 6/27/2020 TECHNIQUE:  Multiplanar, multisequence imaging of the thoracic spine was performed. . IMAGE QUALITY: Diagnostic. FINDINGS: ALIGNMENT: Normal alignment of the thoracic spine. No compression fracture. No subluxation. No scoliosis. MARROW SIGNAL:  Normal marrow signal is identified within the visualized bony structures. No discrete marrow lesion. THORACIC CORD: Normal signal within the thoracic cord. PARAVERTEBRAL SOFT TISSUES:  Normal. THORACIC DEGENERATIVE CHANGE: There is again prominence of the dorsal epidural fat with narrowing of the thecal sac. Stable left T4-5 disc protrusion without central canal narrowing. Stable T5-6 disc bulge and left paracentral disc protrusion abutting the ventral aspect of the cord with mild central canal narrowing. Stable left paracentral disc protrusion at T6-7 abutting the ventral aspect of the cord with mild central canal narrowing. Stable disc bulge at the T7-8 level with mild central canal narrowing. Stable disc bulge at the T8-9 level with mild central canal narrowing. . Disc bulge at T10-11 with mild central canal narrowing. . Right paracentral disc protrusion at the T11-12 with subligamentous extension inferiorly causing mild central canal narrowing, new since 2004 and similar to the prior 2020 examination. OTHER FINDINGS: Bilateral renal cortical cysts. Impression: 1. The thoracic vertebral body heights are maintained demonstrating no acute fracture or subluxation. 2. There is again prominence of the dorsal epidural fat at the mid thoracic region narrowing the thecal sac. 3. Stable mild degenerative disc disease at the T4-5 through the T10-11 levels with mild central canal narrowing. Right paracentral disc protrusion at T11-12 with mild central canal narrowing, new since 2004 and similar to the prior 2020 examination. 4. The thoracic cord appears normal in caliber and signal. Workstation performed: [id number]     Procedure: MRI lumbar spine without contrast    Result Date: 7/20/2023  Narrative: MRI LUMBAR SPINE WITHOUT CONTRAST INDICATION: M51.16: Intervertebral disc disorders with radiculopathy, lumbar region. COMPARISON: MRI lumbar spine 6/27/2020 TECHNIQUE:  Multiplanar, multisequence imaging of the lumbar spine was performed. . IMAGE QUALITY:  Diagnostic FINDINGS: VERTEBRAL BODIES: Transitional lumbosacral anatomy is again noted with a sacralized L5 vertebral body. Numbering is based on counting down from C2 on the corresponding thoracic spine MRI. The numbering scheme is consistent with the prior MRI of 6/27/2020. Normal alignment of the lumbar spine. No spondylolysis or spondylolisthesis. No scoliosis. No compression fracture. Normal marrow signal is identified within the visualized bony structures. No discrete marrow lesion. SACRUM:  Normal signal within the sacrum. No evidence of insufficiency or stress fracture. DISTAL CORD AND CONUS:  Normal size and signal within the distal cord and conus. The conus terminates at the L1 level.   The cauda equina nerve roots appear normal in morphology with crowding at the L3-4 and L4-5 levels secondary to thecal sac narrowing. PARASPINAL SOFT TISSUES: Mild dependent subcutaneous edema within the lower back region. LOWER THORACIC DISC SPACES: Stable right paracentral disc protrusion at the T11-12 level with subligamentous extension inferiorly causing mild central canal narrowing. LUMBAR DISC SPACES: L1-2: No focal disc herniation, central canal stenosis, or neural foraminal narrowing. L2-3: Stable mild to moderate diffuse disc bulge with bilateral ligamentum flavum and facet hypertrophy. Mild to moderate central canal and bilateral subarticular/lateral recess narrowing, unchanged. Mild bilateral neural foraminal stenosis is again noted. L3-4:  Stable mild to moderate diffuse disc bulge extending into the foraminal regions. Bilateral ligamentum flavum and facet hypertrophy. Mild to moderate central canal and bilateral subarticular/lateral recess narrowing, unchanged. Mild bilateral neural foraminal stenosis. L4-5: Stable diffuse disc bulge and bilateral facet hypertrophy. Mild central canal and bilateral subarticular/lateral recess narrowing. Mild bilateral neural foraminal stenosis similar to prior study. L5-S1: Rudimentary disc space secondary to transitional anatomy. OTHER FINDINGS: Small bilateral renal cortical cysts are again noted. Evidence of hepatomegaly. Impression: 1. Transitional lumbosacral anatomy is again noted with a sacralized L5 vertebral body. Numbering is based on counting down from C2 on the corresponding thoracic spine MRI. The numbering scheme is consistent with the prior MRI of 6/27/2020. 2. Multilevel lumbar degenerative disc disease similar to 6/27/2020 with no new disc herniation. Mild to moderate central canal stenosis is again noted at the L2-3 and L3-4 levels. 3. Small bilateral renal cortical cysts are again noted. Hepatomegaly-correlate with LFTs.  Workstation performed: [id number]          Allergies   Allergen Reactions   • Molds & Smuts Allergic Rhinitis   • Other Allergic Rhinitis     RAGWEED, CAT DANDER, DOG DANDER    • Pollen Extract Other (See Comments)     Cold symptoms         Past Medical History:   Diagnosis Date   • Acute embolism and thrombosis of unspecified deep veins of unspecified lower extremity (HCC)     Last Assessed:  5/18/17   • Anemia    • Anosmia    • Anxiety    • Arthritis    • Asthma    • Back pain    • Bilateral macular retinal edema    • CAD (coronary artery disease)    • Cataract    • Cervical disc herniation    • Cervical radiculopathy    • Cervical spinal stenosis    • Cervical spondylolysis    • Chronic kidney disease    • Chronic mastoiditis    • Colon polyp    • Complex endometrial hyperplasia    • Depression    • Diabetes mellitus (HCC)    • Disease of thyroid gland    • DVT (deep venous thrombosis) (HCC)    • DVT (deep venous thrombosis) ([address]) 6/16/2017   • Fibromyalgia    • Fibromyalgia, primary    • Hyperlipidemia    • Hypertension    • Hypothyroid    • Kidney stone    • Lumbar radiculopathy    • Neck pain    • Obese    • PONV (postoperative nausea and vomiting)    • Shingles 07/01/2021   • Spinal stenosis    • Stomach ulcer    • Thyroid disease      Past Surgical History:   Procedure Laterality Date   • BACK SURGERY     • CARPAL TUNNEL RELEASE     • CATARACT EXTRACTION     • CHOLECYSTECTOMY     • COLONOSCOPY     • CORONARY ANGIOPLASTY     • CORONARY ARTERY BYPASS GRAFT     • CYSTOSCOPY N/A 6/20/2017    Procedure: CYSTOSCOPY;  Surgeon: Lazarus Hind, MD;  Location: [facility];  Service: Gynecology Oncology   • CYSTOSCOPY     • GA LAPS TOTAL HYSTERECT 250 GM/< W/RMVL TUBE/OVARY N/A 6/20/2017    Procedure: ROBOTIC HYSTERECTOMY; BILATERAL SALPINO-OOPHERECTOMY; umbilical hernia repair.;  Surgeon: Lazarus Hind, MD;  Location: [facility];  Service: Gynecology Oncology   • GA REPAIR FIRST ABDOMINAL WALL HERNIA N/A 5/12/2022    Procedure: REPAIR HERNIA INCISIONAL;  Surgeon: Beatris Espinal DO [place];  Location: [facility];  Service: General   • TONSILECTOMY AND ADNOIDECTOMY     • TONSILLECTOMY     • UMBILICAL HERNIA REPAIR       Current Outpatient Medications on File Prior to Visit   Medication Sig Dispense Refill   • ALPRAZolam (XANAX) 0.5 mg tablet Take 1 tablet (0.5 mg total) by mouth 2 (two) times a day as needed for anxiety 60 tablet 0   • aspirin 81 MG tablet Take 81 mg by mouth daily     • B-D UF III MINI PEN NEEDLES 31G X 5 MM MISC      • BAQSIMI TWO PACK 3 MG/DOSE POWD Use as directed   0   • BD Insulin Syringe U/F 31G X 5/16" 0.5 ML MISC 4 (four) times a day As directed     • cholecalciferol (VITAMIN D3) 1,000 units tablet Take 2,000 Units by mouth daily     • Coenzyme Q10 (Co Q-10) 200 MG CAPS Take by mouth in the morning     • diclofenac sodium (VOLTAREN) 1 % Apply 2 g topically 4 (four) times a day 1 Tube 0   • diltiazem (TIAZAC) 300 MG 24 hr capsule Take 300 mg by mouth daily     • Docusate Sodium (DSS) 100 MG CAPS Take 1 capsule by mouth every 12 (twelve) hours     • DULoxetine (CYMBALTA) 60 mg delayed release capsule Take 1 capsule (60 mg total) by mouth daily 90 capsule 3   • fluocinonide (LIDEX) 0.05 % external solution      • fluticasone (FLONASE) 50 mcg/act nasal spray 1 spray into each nostril daily 9.9 mL 0   • furosemide (LASIX) 20 mg tablet      • insulin aspart (NovoLOG) 100 units/mL injection Inject 12 Units under the skin 3 (three) times a day before meals  0   • Insulin Disposable Pump (OmniPod Dash 5 Pack Pods) MISC USE 1 POD EVERY 1.5 DAYS     • irbesartan (AVAPRO) 300 mg tablet Take 300 mg by mouth     • isosorbide mononitrate (IMDUR) 60 mg 24 hr tablet TAKE 1.5 TABs AT NIGHT     • levothyroxine 50 mcg tablet Take 50 mcg by mouth daily     • naloxone (NARCAN) 4 mg/0.1 mL nasal spray Administer 1 spray into a nostril. If no response after 2-3 minutes, give another dose in the other nostril using a new spray.  1 each 1   • nitroglycerin (NITROLINGUAL) 0.4 mg/spray spray USE ONE SPRAY Q 5 MINUTES AS NEEDED FOR CHEST PAIN. IF NO RELIEF, CALL 911.  1   • NovoLOG 100 UNIT/ML SOLN INFUSE UNDER THE SKIN VIA INSULIN PUMP AS DIRECTED. TOTAL DAILY DOSE  UNITS     • Ozempic, 0.25 or 0.5 MG/DOSE, 2 MG/3ML injection pen INJECT 0.25 MG UNDER THE SKIN ONCE A WEEK. • rosuvastatin (CRESTOR) 20 MG tablet Take 20 mg by mouth every evening    2   • guaiFENesin (MUCINEX) 600 mg 12 hr tablet Take 2 tablets (1,200 mg total) by mouth every 12 (twelve) hours (Patient not taking: Reported on 2023) 30 tablet 0     No current facility-administered medications on file prior to visit. Family History   Problem Relation Age of Onset   • Arthritis Mother    • Leukemia Mother    • Other Mother         Anxiety, major depressive disorder, recurrent episode with atypical features   • Coronary artery disease Father         Heart problem   • Diabetes Father    • Other Father         Infectious disease   • Alzheimer's disease Maternal Grandmother    • Other Maternal Grandfather         Heart problem   • Other Daughter         Anxiety, major depressive disorder, recurrent episode with atypical features   • Alcohol abuse Other         Grandparent   • Cancer Family    • Diabetes Family    • Hypertension Family    • Other Family         Reported prior back trouble, thyroid disorder     Social History     Socioeconomic History   • Marital status: /Civil Union     Spouse name: Not on file   • Number of children: 2   • Years of education: High school or GED   • Highest education level: Not on file   Occupational History   • Occupation: Retired   Tobacco Use   • Smoking status: Former     Packs/day: 1.00     Years: 6.00     Total pack years: 6.00     Types: Cigarettes     Quit date:      Years since quittin.6   • Smokeless tobacco: Never   • Tobacco comments:     Smoked about a half a pack for about 4 yrs. Quite about 50 yrs ago.    Vaping Use   • Vaping Use: Never used   Substance and Sexual Activity   • Alcohol use: No   • Drug use: No   • Sexual activity: Not Currently     Comment: No known STD risk factors   Other Topics Concern   • Not on file   Social History Narrative    Lives independently with spouse    No known risk factors    Denied:  Exercising regularly     Social Determinants of Health     Financial Resource Strain: Low Risk  (12/9/2022)    Overall Financial Resource Strain (CARDIA)    • Difficulty of Paying Living Expenses: Not hard at all   Food Insecurity: Not on file   Transportation Needs: No Transportation Needs (12/9/2022)    PRAPARE - Transportation    • Lack of Transportation (Medical): No    • Lack of Transportation (Non-Medical): No   Physical Activity: Not on file   Stress: Not on file   Social Connections: Not on file   Intimate Partner Violence: Not on file   Housing Stability: Not on file     Vitals:    07/28/23 1110   BP: 130/72   Pulse: 62   SpO2: 96%   Weight: 91.6 kg (202 lb)   Height: 4' 11" (1.499 m)     Results for orders placed or performed in visit on 77/92/19   Basic metabolic panel   Result Value Ref Range    Sodium 137 135 - 147 mmol/L    Potassium 4.1 3.5 - 5.3 mmol/L    Chloride 107 96 - 108 mmol/L    CO2 22 21 - 32 mmol/L    ANION GAP 8 mmol/L    BUN 20 5 - 25 mg/dL    Creatinine 1.58 (H) 0.60 - 1.30 mg/dL    Glucose 215 (H) 65 - 140 mg/dL    Calcium 9.4 8.4 - 10.2 mg/dL    eGFR 31 ml/min/1.73sq m     Weight (last 2 days)     Date/Time Weight    07/28/23 1110 91.6 (202)        Body mass index is 40.8 kg/m². BP      Temp      Pulse     Resp      SpO2        Vitals:    07/28/23 1110   Weight: 91.6 kg (202 lb)     Vitals:    07/28/23 1110   Weight: 91.6 kg (202 lb)       /72   Pulse 62   Ht 4' 11" (1.499 m)   Wt 91.6 kg (202 lb)   SpO2 96%   BMI 40.80 kg/m²          Physical Exam  Vitals and nursing note reviewed. Constitutional:       General: She is not in acute distress. Appearance: Normal appearance. She is well-developed. She is obese.  She is not ill-appearing, toxic-appearing or diaphoretic. HENT:      Head: Normocephalic. Eyes:      General: No scleral icterus. Right eye: No discharge. Left eye: No discharge. Conjunctiva/sclera: Conjunctivae normal.      Pupils: Pupils are equal, round, and reactive to light. Cardiovascular:      Rate and Rhythm: Normal rate and regular rhythm. Heart sounds: Normal heart sounds. No murmur heard. No friction rub. No gallop. Pulmonary:      Effort: No respiratory distress. Breath sounds: Normal breath sounds. No wheezing or rales. Abdominal:      General: Bowel sounds are normal. There is no distension. Palpations: Abdomen is soft. There is no mass. Tenderness: There is no abdominal tenderness. There is no guarding or rebound. Musculoskeletal:         General: No deformity. Cervical back: Neck supple. Lymphadenopathy:      Cervical: No cervical adenopathy. Neurological:      Mental Status: She is alert. Coordination: Coordination normal.   Psychiatric:         Mood and Affect: Mood is anxious. Thought Content: Thought content does not include suicidal ideation.

## 2023-07-30 PROBLEM — I82.409 DVT (DEEP VENOUS THROMBOSIS) (HCC): Status: RESOLVED | Noted: 2017-06-16 | Resolved: 2023-07-30

## 2023-07-30 PROBLEM — R16.0 ENLARGED LIVER: Status: ACTIVE | Noted: 2023-07-30

## 2023-07-30 PROBLEM — Z13.820 SCREENING FOR OSTEOPOROSIS: Status: ACTIVE | Noted: 2023-07-30

## 2023-07-30 PROBLEM — N28.1 KIDNEY CYSTS: Status: ACTIVE | Noted: 2023-07-30

## 2023-07-30 NOTE — ASSESSMENT & PLAN NOTE
Lab Results   Component Value Date    HGBA1C 10.4 (H) 06/13/2023   I have counselled the pt to follow a healthy and balanced diet ,and recommend routine exercise. I will be ordering diabetic laboratories including comprehensive metabolic panel, hemoglobin A1c, urine microalbumin, lipid panel.   Target A1c under 7 recommend to start the semaglutide

## 2023-07-30 NOTE — ASSESSMENT & PLAN NOTE
Hypertension - controlled, I have counseled patient following healthy balance diet, I would like the patient reduce sodium, exercise routinely, I would like the patient continued the med current medical regiment and we will continue to monitor.   Blood pressure today 130/72 stable and doing well

## 2023-07-30 NOTE — ASSESSMENT & PLAN NOTE
Suspect fatty liver we will check ultrasound elastography recommend weight loss recommend improving hemoglobin A1c avoidance of alcohol

## 2023-07-30 NOTE — ASSESSMENT & PLAN NOTE
Hyperlipidemia controlled continue with current medical regiment recommend a low-cholesterol diet and recommend routine exercise we will continue to monitor the progress.   Continue Crestor 20 mg once daily

## 2023-07-30 NOTE — ASSESSMENT & PLAN NOTE
Stable but ongoing symptoms no SI continue Xanax 0.5 mg twice daily as needed, Cymbalta 60 mg once daily

## 2023-07-31 NOTE — TELEPHONE ENCOUNTER
With new template Dr. Laura Wagner does not have a schedule on the days he has the residents. I told Leeanna Herron and she is going to talk to the person who is handling our templates.

## 2023-08-01 ENCOUNTER — SOCIAL WORK (OUTPATIENT)
Dept: BEHAVIORAL/MENTAL HEALTH CLINIC | Facility: CLINIC | Age: 78
End: 2023-08-01
Payer: COMMERCIAL

## 2023-08-01 DIAGNOSIS — F41.9 ANXIETY: Primary | ICD-10-CM

## 2023-08-01 PROCEDURE — 90834 PSYTX W PT 45 MINUTES: CPT | Performed by: SOCIAL WORKER

## 2023-08-01 NOTE — PSYCH
Behavioral Health Psychotherapy Progress Note    Psychotherapy Provided: Individual Psychotherapy     1. Anxiety            Goals addressed in session: Goal 1     DATA: Trinh Jules has been feeling more anxious and overwhelmed with multiple stressors including her worsening physical health issues that now include issues with her liver. Has become more preoccupied about her mortality and now has not been able to fall asleep at night. Has felt depressed at times. Denies any SI and feels that if sleep improved so too would her mood. Processed stressors and their influence on her mood during session- validation and supportive therapy provided. During this session, this clinician used the following therapeutic modalities: Solution-Focused Therapy and Supportive Psychotherapy    Substance Abuse was addressed during this session. If the client is diagnosed with a co-occurring substance use disorder, please indicate any changes in the frequency or amount of use: none. Stage of change for addressing substance use diagnoses: No substance use/Not applicable    ASSESSMENT:  Palak Hill presents with a Anxious mood. her affect is Normal range and intensity, which is congruent, with her mood and the content of the session. The client has not made progress on their goals. Palak Hill presents with a minimal risk of suicide, minimal risk of self-harm, and minimal risk of harm to others. For any risk assessment that surpasses a "low" rating, a safety plan must be developed. A safety plan was indicated: no  If yes, describe in detail n/a    PLAN: Between sessions, Palak Hill will utilize stress mgmt strategies reviewed to manage her anxiety. Reviewed boundaries to maintain with family to lessen stress. Will consult with PCP regarding her sleep issues. . At the next session, the therapist will use Solution-Focused Therapy and Supportive Psychotherapy to address anxiety.     Behavioral Health Treatment Plan and Discharge Planning: Governor Racheal is aware of and agrees to continue to work on their treatment plan. They have identified and are working toward their discharge goals.  no    Visit start and stop times: 12:10-1:00    08/01/23

## 2023-08-14 NOTE — ASSESSMENT & PLAN NOTE
Returns for follow up of low back pain  · Ongoing, nonradiating low back pain. No BBI, ambulates with cane  · Has done MBB and RFA with Dr. Dudley Soulier, PT was not helping, not taking anything for pain  · Seen in the past by Dr. Micheal Borjas for neck and back pain. Hx of surgery in 1985 for right sided sciatica. · Exam: Midline low back TTP, BLE-HF/KF/KE/5, DF/PF 5/5, LT intact, 2+ patellar and Achilles reflexes, no clonus    Imaging:  · MRI thoracic 7/17/23: 1. The thoracic vertebral body heights are maintained demonstrating no acute fracture or subluxation. 2. There is again prominence of the dorsal epidural fat at the mid thoracic region narrowing the thecal sac. 3. Stable mild degenerative disc disease at the T4-5 through the T10-11 levels with mild central canal, narrowing. Right paracentral disc protrusion at T11-12 with mild central canal narrowing, new since 2004 and similar to the prior 2020 examination. 4. The thoracic cord appears normal in caliber and signal  · MRI lumbar 7/17/23: 1. Transitional lumbosacral anatomy is again noted with a sacralized L5 vertebral body. Numbering is based on counting down from C2 on the corresponding thoracic spine MRI. The numbering scheme is consistent with the prior MRI of 6/27/2020. 2. Multilevel lumbar degenerative disc disease similar to 6/27/2020 with no new disc herniation. Mild to moderate central canal stenosis is again noted at the L2-3 and L3-4 levels. 3. Small bilateral renal cortical cysts are again noted. Hepatomegaly-correlate with LFTs    Plan:  · Reviewed imaging with patient, , and Dr. Julius Tolbert. Patient has degenerative changes in her thoracolumbar spine, but no significant canal or foraminal stenosis. · Discussed weight loss with patient as this will help her overall in terms of pain and health. With a BMI of 41, this is at the cutoff point for elective spine surgery should she need it in the future.   Also would not likely be a candidate for SCS given BMI.  · Discussed that back pain itself is very difficult to treat surgically. With poorly controlled diabetes and morbid obesity, she is at increased risk of infection, hardware complication, wound healing issues etc.  · Recommend retrialing PT for core and back strengthening. Can try additional injections, but would also have her try oral medications to see if that is enough to give her some relief. · Her diabetes is also not controlled. She is establishing with a new endocrinologist with the hopes of better control soon. Would not be a candidate for elective back surgery unless A1c was <7.5. · Continue conservative measures for now.

## 2023-08-15 ENCOUNTER — OFFICE VISIT (OUTPATIENT)
Dept: NEUROSURGERY | Facility: CLINIC | Age: 78
End: 2023-08-15
Payer: COMMERCIAL

## 2023-08-15 VITALS
DIASTOLIC BLOOD PRESSURE: 58 MMHG | SYSTOLIC BLOOD PRESSURE: 126 MMHG | WEIGHT: 202.6 LBS | HEIGHT: 59 IN | OXYGEN SATURATION: 96 % | BODY MASS INDEX: 40.84 KG/M2 | TEMPERATURE: 98.2 F | HEART RATE: 68 BPM

## 2023-08-15 DIAGNOSIS — M48.02 STENOSIS OF CERVICAL SPINE WITH MYELOPATHY (HCC): Primary | ICD-10-CM

## 2023-08-15 DIAGNOSIS — G99.2 STENOSIS OF CERVICAL SPINE WITH MYELOPATHY (HCC): Primary | ICD-10-CM

## 2023-08-15 PROCEDURE — 99215 OFFICE O/P EST HI 40 MIN: CPT | Performed by: PHYSICIAN ASSISTANT

## 2023-08-15 RX ORDER — PROCHLORPERAZINE 25 MG/1
1 SUPPOSITORY RECTAL
COMMUNITY
Start: 2023-07-28

## 2023-08-15 NOTE — ASSESSMENT & PLAN NOTE
Noting neck pain into shoulders and weakness in her arms and hands, R>L  · Also has aching in her arms in no particular distribution. · Notes difficulty picking things up, dropping things, change in handwriting, trouble with zippers, and at times feels off balance with recent falls. Ambulating with cane. · Exam: midline neck TTP, RUE - deltoid 5/5, biceps 4+/5, triceps 4/5, WF 4/5, WE 5/5, IO 5/5,  4/5. LUE - deltoid 5/5, biceps 4+/5, triceps 4/5, WF 5/5, WE 5/5, IO 4/5,  4/5. LT intact, 3+ biceps/brachioradialis reflexes, no Mi's, slow rapid finger movements, no drift with normal finger-to-nose    Plan:  · Reviewed current symptoms with patient and . Concern for possible cervical spinal stenosis with myelopathy. · Will order MRI cervical spine for further evaluation. · Reviewed red flag s/s. · Follow up as joint appointment with Dr. Luis A Alexander once imaging completed. · Call sooner with any questions or concerns.

## 2023-08-15 NOTE — PROGRESS NOTES
Neurosurgery Office Note  Anne Watt 69 y.o. female MRN: [id number]      Assessment/Plan     Lumbar spondylosis  Returns for follow up of low back pain  · Ongoing, nonradiating low back pain. No BBI, ambulates with cane  · Has done MBB and RFA with Dr. Hamzah Snell, PT was not helping, not taking anything for pain  · Seen in the past by Dr. Sydnie Arias for neck and back pain. Hx of surgery in 1985 for right sided sciatica. · Exam: Midline low back TTP, BLE-HF/KF/KE/5, DF/PF 5/5, LT intact, 2+ patellar and Achilles reflexes, no clonus    Imaging:  · MRI thoracic 7/17/23: 1. The thoracic vertebral body heights are maintained demonstrating no acute fracture or subluxation. 2. There is again prominence of the dorsal epidural fat at the mid thoracic region narrowing the thecal sac. 3. Stable mild degenerative disc disease at the T4-5 through the T10-11 levels with mild central canal, narrowing. Right paracentral disc protrusion at T11-12 with mild central canal narrowing, new since 2004 and similar to the prior 2020 examination. 4. The thoracic cord appears normal in caliber and signal  · MRI lumbar 7/17/23: 1. Transitional lumbosacral anatomy is again noted with a sacralized L5 vertebral body. Numbering is based on counting down from C2 on the corresponding thoracic spine MRI. The numbering scheme is consistent with the prior MRI of 6/27/2020. 2. Multilevel lumbar degenerative disc disease similar to 6/27/2020 with no new disc herniation. Mild to moderate central canal stenosis is again noted at the L2-3 and L3-4 levels. 3. Small bilateral renal cortical cysts are again noted. Hepatomegaly-correlate with LFTs    Plan:  · Reviewed imaging with patient, , and Dr. Hamlet Martinez. Patient has degenerative changes in her thoracolumbar spine, but no significant canal or foraminal stenosis. · Discussed weight loss with patient as this will help her overall in terms of pain and health.   With a BMI of 41, this is at the cutoff point for elective spine surgery should she need it in the future. Also would not likely be a candidate for SCS given BMI. · Discussed that back pain itself is very difficult to treat surgically. With poorly controlled diabetes and morbid obesity, she is at increased risk of infection, hardware complication, wound healing issues etc.  · Recommend retrialing PT for core and back strengthening. Can try additional injections, but would also have her try oral medications to see if that is enough to give her some relief. · Her diabetes is also not controlled. She is establishing with a new endocrinologist with the hopes of better control soon. Would not be a candidate for elective back surgery unless A1c was <7.5. · Continue conservative measures for now. Spondylosis of cervical region without myelopathy or radiculopathy  Noting neck pain into shoulders and weakness in her arms and hands, R>L  · Also has aching in her arms in no particular distribution. · Notes difficulty picking things up, dropping things, change in handwriting, trouble with zippers, and at times feels off balance with recent falls. Ambulating with cane. · Exam: midline neck TTP, RUE - deltoid 5/5, biceps 4+/5, triceps 4/5, WF 4/5, WE 5/5, IO 5/5,  4/5. LUE - deltoid 5/5, biceps 4+/5, triceps 4/5, WF 5/5, WE 5/5, IO 4/5,  4/5. LT intact, 3+ biceps/brachioradialis reflexes, no Mi's, slow rapid finger movements, no drift with normal finger-to-nose    Plan:  · Reviewed current symptoms with patient and . Concern for possible cervical spinal stenosis with myelopathy. · Will order MRI cervical spine for further evaluation. · Reviewed red flag s/s. · Follow up as joint appointment with Dr. Bailey Hansen once imaging completed. · Call sooner with any questions or concerns.         Diagnoses and all orders for this visit:    Stenosis of cervical spine with myelopathy ([address])  -     MRI cervical spine wo contrast; Future    Other orders  -     Continuous Blood Gluc Sensor (Dexcom G6 Sensor) MISC; Use 1 each          I have spent a total time of 60 minutes on 08/15/23 in caring for this patient including Diagnostic results, Instructions for management, Patient and family education, Risk factor reductions, Impressions, Counseling / Coordination of care, Documenting in the medical record, Reviewing / ordering tests, medicine, procedures  , Obtaining or reviewing history   and Communicating with other healthcare professionals . CHIEF COMPLAINT    Chief Complaint   Patient presents with   • New Patient Visit     MRI 7/17/23         HISTORY    History of Present Illness     69y.o. year old female     61-year-old female returns for follow-up of low back pain. States it is across her low back, it does not radiate down her legs or into the groin. Rates her pain 8-9/10. Ambulates with a cane especially in the humid weather for steadiness. Denies BBI, but does have urgency. States that she is unable to stand for too long due to weakness in her legs. She does not like taking pills, and is not taking anything for her pain. In the past has tried PT, but it did not help and she was not progressing. Also underwent injections in the past which did not help. States that in [address] [place] [address], she underwent surgery for right-sided sciatica. Patient mentions that she is noting some neck pain into the shoulders and weakness in her arms and hands, more right-sided than left with some aching in her arms in no particular distribution. Also states that she is having difficulty picking things up, dropping things and at times feels off balance with recent falls. Right-hand-dominant, and has noted a change in handwriting. Notes that since at least last winter, she has been having issues with fine motor skills and her  was up in her winter coat for her.   History of morbid obesity with BMI 41, DM2 that is poorly controlled with A1c 10.4, CKD Stage III, hypertension, CAD, s/p CABG on baby aspirin. See Discussion    REVIEW OF SYSTEMS    Review of Systems   Constitutional: Negative. HENT: Negative. Eyes: Negative. Respiratory: Negative. Cardiovascular: Negative. Gastrointestinal: Positive for diarrhea (occasionally). Endocrine: Negative. Genitourinary: Positive for urgency. Musculoskeletal: Positive for back pain (lumbar and shoulder area) and gait problem (unsteady and limited due to pain- 1 recent fall). Skin: Negative. Allergic/Immunologic: Negative. Neurological: Positive for dizziness (due to fall last week), weakness (generalized) and light-headedness (due to fall last week). Negative for tremors, seizures, numbness and headaches. Hematological: Negative. Psychiatric/Behavioral: Positive for sleep disturbance. ROS obtained by MA. Reviewed. See HPI.      Meds/Allergies     Current Outpatient Medications   Medication Sig Dispense Refill   • ALPRAZolam (XANAX) 0.5 mg tablet Take 1 tablet (0.5 mg total) by mouth 2 (two) times a day as needed for anxiety 60 tablet 0   • aspirin 81 MG tablet Take 81 mg by mouth daily     • B-D UF III MINI PEN NEEDLES 31G X 5 MM MISC      • BAQSIMI TWO PACK 3 MG/DOSE POWD Use as directed   0   • BD Insulin Syringe U/F 31G X 5/16" 0.5 ML MISC 4 (four) times a day As directed     • cholecalciferol (VITAMIN D3) 1,000 units tablet Take 2,000 Units by mouth daily     • Coenzyme Q10 (Co Q-10) 200 MG CAPS Take by mouth in the morning     • Continuous Blood Gluc Sensor (Dexcom G6 Sensor) MISC Use 1 each     • diclofenac sodium (VOLTAREN) 1 % Apply 2 g topically 4 (four) times a day 1 Tube 0   • diltiazem (TIAZAC) 300 MG 24 hr capsule Take 300 mg by mouth daily     • Docusate Sodium (DSS) 100 MG CAPS Take 1 capsule by mouth every 12 (twelve) hours     • DULoxetine (CYMBALTA) 60 mg delayed release capsule Take 1 capsule (60 mg total) by mouth daily 90 capsule 3   • fluocinonide (LIDEX) 0.05 % external solution      • fluticasone (FLONASE) 50 mcg/act nasal spray 1 spray into each nostril daily 9.9 mL 0   • furosemide (LASIX) 20 mg tablet      • insulin aspart (NovoLOG) 100 units/mL injection Inject 12 Units under the skin 3 (three) times a day before meals  0   • Insulin Disposable Pump (OmniPod Dash 5 Pack Pods) MISC USE 1 POD EVERY 1.5 DAYS     • irbesartan (AVAPRO) 300 mg tablet Take 300 mg by mouth     • isosorbide mononitrate (IMDUR) 60 mg 24 hr tablet TAKE 1.5 TABs AT NIGHT     • levothyroxine 50 mcg tablet Take 50 mcg by mouth daily     • naloxone (NARCAN) 4 mg/0.1 mL nasal spray Administer 1 spray into a nostril. If no response after 2-3 minutes, give another dose in the other nostril using a new spray. 1 each 1   • nitroglycerin (NITROLINGUAL) 0.4 mg/spray spray USE ONE SPRAY Q 5 MINUTES AS NEEDED FOR CHEST PAIN. IF NO RELIEF, CALL 911.  1   • NovoLOG 100 UNIT/ML SOLN INFUSE UNDER THE SKIN VIA INSULIN PUMP AS DIRECTED. TOTAL DAILY DOSE  UNITS     • Ozempic, 0.25 or 0.5 MG/DOSE, 2 MG/3ML injection pen INJECT 0.25 MG UNDER THE SKIN ONCE A WEEK. • rosuvastatin (CRESTOR) 20 MG tablet Take 20 mg by mouth every evening    2   • guaiFENesin (MUCINEX) 600 mg 12 hr tablet Take 2 tablets (1,200 mg total) by mouth every 12 (twelve) hours (Patient not taking: Reported on 6/29/2023) 30 tablet 0     No current facility-administered medications for this visit.        Allergies   Allergen Reactions   • Molds & Smuts Allergic Rhinitis   • Other Allergic Rhinitis     RAGWEED, CAT DANDER, DOG DANDER    • Pollen Extract Other (See Comments)     Cold symptoms         PAST HISTORY    Past Medical History:   Diagnosis Date   • Acute embolism and thrombosis of unspecified deep veins of unspecified lower extremity (HCC)     Last Assessed:  5/18/17   • Anemia    • Anosmia    • Anxiety    • Arthritis    • Asthma    • Back pain    • Bilateral macular retinal edema    • CAD (coronary artery disease)    • Cataract    • Cervical disc herniation    • Cervical radiculopathy    • Cervical spinal stenosis    • Cervical spondylolysis    • Chronic kidney disease    • Chronic mastoiditis    • Colon polyp    • Complex endometrial hyperplasia    • Depression    • Diabetes mellitus (HCC)    • Disease of thyroid gland    • DVT (deep venous thrombosis) (HCC)    • DVT (deep venous thrombosis) ([address]) 2017   • Fibromyalgia    • Fibromyalgia, primary    • Hyperlipidemia    • Hypertension    • Hypothyroid    • Kidney stone    • Lumbar radiculopathy    • Neck pain    • Obese    • PONV (postoperative nausea and vomiting)    • Shingles 2021   • Spinal stenosis    • Stomach ulcer    • Thyroid disease        Past Surgical History:   Procedure Laterality Date   • BACK SURGERY     • CARPAL TUNNEL RELEASE     • CATARACT EXTRACTION     • CHOLECYSTECTOMY     • COLONOSCOPY     • CORONARY ANGIOPLASTY     • CORONARY ARTERY BYPASS GRAFT     • CYSTOSCOPY N/A 2017    Procedure: CYSTOSCOPY;  Surgeon: Mariluz Dill MD;  Location: [facility];  Service: Gynecology Oncology   • CYSTOSCOPY     • NY LAPS TOTAL HYSTERECT 250 GM/< W/RMVL TUBE/OVARY N/A 2017    Procedure: ROBOTIC HYSTERECTOMY; BILATERAL SALPINO-OOPHERECTOMY; umbilical hernia repair.;  Surgeon: Mariluz Dill MD;  Location: [facility];  Service: Gynecology Oncology   • [place] REPAIR FIRST ABDOMINAL WALL HERNIA N/A 2022    Procedure: REPAIR HERNIA INCISIONAL;  Surgeon: Paco Downs DO;  Location: [facility];  Service: General   • TONSILECTOMY AND ADNOIDECTOMY     • TONSILLECTOMY     • UMBILICAL HERNIA REPAIR         Social History     Tobacco Use   • Smoking status: Former     Packs/day: 1.00     Years: 6.00     Total pack years: 6.00     Types: Cigarettes     Quit date: 47     Years since quittin.6   • Smokeless tobacco: Never   • Tobacco comments:     Smoked about a half a pack for about 4 yrs. Quite about 50 yrs ago.    Vaping Use   • Vaping Use: Never used   Substance Use Topics   • Alcohol use: No   • Drug use: No       Family History   Problem Relation Age of Onset   • Arthritis Mother    • Leukemia Mother    • Other Mother         Anxiety, major depressive disorder, recurrent episode with atypical features   • Coronary artery disease Father         Heart problem   • Diabetes Father    • Other Father         Infectious disease   • Alzheimer's disease Maternal Grandmother    • Other Maternal Grandfather         Heart problem   • Other Daughter         Anxiety, major depressive disorder, recurrent episode with atypical features   • Alcohol abuse Other         Grandparent   • Cancer Family    • Diabetes Family    • Hypertension Family    • Other Family         Reported prior back trouble, thyroid disorder         Above history personally reviewed. EXAM    Vitals:Blood pressure 126/58, pulse 68, temperature 98.2 °F (36.8 °C), temperature source Temporal, height 4' 11" (1.499 m), weight 91.9 kg (202 lb 9.6 oz), SpO2 96 %. ,Body mass index is 40.92 kg/m². Physical Exam  Vitals reviewed. Constitutional:       General: She is awake. Appearance: Normal appearance. HENT:      Head: Normocephalic and atraumatic. Eyes:      Conjunctiva/sclera: Conjunctivae normal.   Cardiovascular:      Rate and Rhythm: Normal rate. Pulmonary:      Effort: Pulmonary effort is normal.   Musculoskeletal:         General: Tenderness present. Cervical back: Tenderness present. Skin:     General: Skin is warm and dry. Neurological:      Mental Status: She is alert and oriented to person, place, and time. Coordination: Finger-Nose-Finger Test normal.      Deep Tendon Reflexes:      Reflex Scores:       Bicep reflexes are 3+ on the right side and 3+ on the left side. Brachioradialis reflexes are 3+ on the right side and 3+ on the left side. Patellar reflexes are 2+ on the right side and 2+ on the left side.        Achilles reflexes are 2+ on the right side and 2+ on the left side. Psychiatric:         Attention and Perception: Attention and perception normal.         Mood and Affect: Mood and affect normal.         Speech: Speech normal.         Behavior: Behavior normal. Behavior is cooperative. Thought Content: Thought content normal.         Cognition and Memory: Cognition and memory normal.         Judgment: Judgment normal.         Neurologic Exam     Mental Status   Oriented to person, place, and time. Follows 2 step commands. Attention: normal. Concentration: normal.   Speech: speech is normal   Level of consciousness: alert  Knowledge: good. Normal comprehension. Cranial Nerves     CN XI   Right trapezius strength: normal  Left trapezius strength: normal    Motor Exam   Muscle bulk: normal  Overall muscle tone: normal  Right arm pronator drift: absent  Left arm pronator drift: absent  RUE - deltoid 5/5, biceps 4+/5, triceps 4/5, WF 4/5, WE 5/5, IO 5/5,  4/5  LUE - deltoid 5/5, biceps 4+/5, triceps 4/5, WF 5/5, WE 5/5, IO 4/5,  4/5  BLE - HF/KF/KE 4/5, DF/PF 5/5     Sensory Exam   Right arm light touch: normal  Left arm light touch: normal  Right leg light touch: normal  Left leg light touch: normal    Gait, Coordination, and Reflexes     Coordination   Finger to nose coordination: normal    Tremor   Resting tremor: absent  Intention tremor: absent  Action tremor: absent    Reflexes   Right brachioradialis: 3+  Left brachioradialis: 3+  Right biceps: 3+  Left biceps: 3+  Right patellar: 2+  Left patellar: 2+  Right achilles: 2+  Left achilles: 2+  Right Mi: absent  Left Mi: absent  Right ankle clonus: absent  Left ankle clonus: absent  Antalgic gait with cane  Slow rapid finger movements         MEDICAL DECISION MAKING    Imaging Studies:     CARD VASCULAR CAROTID DUPLEX BILATERAL    Result Date: 7/23/2023  Narrative: This result has an attachment that is not available.  Right: Mild to moderate calcific plaque throughout the RT CCA, bulb and ICA Less than 50% stenosis in the RT ICA RT ICA/CCA ratio 1.0, no significant change since prev-1.1 RT vertebral is antegrade Mild heterogeneous plaque in the RT subclavian without significant stenosis Left: Mild calcific plaque in the LT bulb and ICA Less than 50% stenosis in the LT ICA LT ICA/CCA ratio 1.1, prev- 1.3 LT vertebral is antegrade Mild heterogeneous plaque in the LT subclavian without significant stenosis Technical Quality Overall the study quality was good. Right Carotid The right brachial BP is 140/62. The right proximal common carotid artery has mild calcific plaque. The right middle common carotid artery has moderate calcific plaque. The right distal common carotid artery has moderate calcific plaque. The right proximal internal carotid artery demonstrates <50% stenosis. The right proximal internal carotid artery has mild calcific plaque. The right middle internal carotid artery has no significant plaque. The right distal internal carotid artery has no significant plaque. The right external carotid artery demonstrates no significant stenosis. The right external carotid artery has no significant plaque. The flow of the right vertebral artery is antegrade. The flow of the right subclavian artery is triphasic. The right subclavian artery has mild heterogeneous plaque. The right bulb of internal carotid artery has moderate calcific plaque. Left Carotid The left brachial BP is 140/62. The left proximal common carotid artery has no significant plaque. The left middle common carotid artery has no significant plaque. The left distal common carotid artery has no significant plaque. The left proximal internal carotid artery demonstrates <50% stenosis. The left proximal internal carotid artery has mild heterogeneous and mild calcific plaque. The left middle internal carotid artery has no significant plaque. The left distal internal carotid artery has no significant plaque.  The left external carotid artery demonstrates no significant stenosis. The left external carotid artery has no significant plaque. The flow of the left vertebral artery is antegrade. The flow of the left subclavian artery is triphasic. The left subclavian artery has mild heterogeneous plaque. The left bulb of internal carotid artery has moderate calcific plaque. MRI thoracic spine wo contrast    Result Date: 7/20/2023  Narrative: MRI THORACIC SPINE WITHOUT CONTRAST INDICATION: M51.14: Intervertebral disc disorders with radiculopathy, thoracic region. COMPARISON: MRI thoracic spine 6/24/2004. MRI lumbar spine 6/27/2020 TECHNIQUE:  Multiplanar, multisequence imaging of the thoracic spine was performed. . IMAGE QUALITY: Diagnostic. FINDINGS: ALIGNMENT: Normal alignment of the thoracic spine. No compression fracture. No subluxation. No scoliosis. MARROW SIGNAL:  Normal marrow signal is identified within the visualized bony structures. No discrete marrow lesion. THORACIC CORD: Normal signal within the thoracic cord. PARAVERTEBRAL SOFT TISSUES:  Normal. THORACIC DEGENERATIVE CHANGE: There is again prominence of the dorsal epidural fat with narrowing of the thecal sac. Stable left T4-5 disc protrusion without central canal narrowing. Stable T5-6 disc bulge and left paracentral disc protrusion abutting the ventral aspect of the cord with mild central canal narrowing. Stable left paracentral disc protrusion at T6-7 abutting the ventral aspect of the cord with mild central canal narrowing. Stable disc bulge at the T7-8 level with mild central canal narrowing. Stable disc bulge at the T8-9 level with mild central canal narrowing. . Disc bulge at T10-11 with mild central canal narrowing. . Right paracentral disc protrusion at the T11-12 with subligamentous extension inferiorly causing mild central canal narrowing, new since 2004 and similar to the prior 2020 examination. OTHER FINDINGS: Bilateral renal cortical cysts. Impression: 1. The thoracic vertebral body heights are maintained demonstrating no acute fracture or subluxation. 2. There is again prominence of the dorsal epidural fat at the mid thoracic region narrowing the thecal sac. 3. Stable mild degenerative disc disease at the T4-5 through the T10-11 levels with mild central canal narrowing. Right paracentral disc protrusion at T11-12 with mild central canal narrowing, new since 2004 and similar to the prior 2020 examination. 4. The thoracic cord appears normal in caliber and signal. Workstation performed: [id number]     MRI lumbar spine without contrast    Result Date: 7/20/2023  Narrative: MRI LUMBAR SPINE WITHOUT CONTRAST INDICATION: M51.16: Intervertebral disc disorders with radiculopathy, lumbar region. COMPARISON: MRI lumbar spine 6/27/2020 TECHNIQUE:  Multiplanar, multisequence imaging of the lumbar spine was performed. . IMAGE QUALITY:  Diagnostic FINDINGS: VERTEBRAL BODIES: Transitional lumbosacral anatomy is again noted with a sacralized L5 vertebral body. Numbering is based on counting down from C2 on the corresponding thoracic spine MRI. The numbering scheme is consistent with the prior MRI of 6/27/2020. Normal alignment of the lumbar spine. No spondylolysis or spondylolisthesis. No scoliosis. No compression fracture. Normal marrow signal is identified within the visualized bony structures. No discrete marrow lesion. SACRUM:  Normal signal within the sacrum. No evidence of insufficiency or stress fracture. DISTAL CORD AND CONUS:  Normal size and signal within the distal cord and conus. The conus terminates at the L1 level. The cauda equina nerve roots appear normal in morphology with crowding at the L3-4 and L4-5 levels secondary to thecal sac narrowing. PARASPINAL SOFT TISSUES: Mild dependent subcutaneous edema within the lower back region.  LOWER THORACIC DISC SPACES: Stable right paracentral disc protrusion at the T11-12 level with subligamentous extension inferiorly causing mild central canal narrowing. LUMBAR DISC SPACES: L1-2: No focal disc herniation, central canal stenosis, or neural foraminal narrowing. L2-3: Stable mild to moderate diffuse disc bulge with bilateral ligamentum flavum and facet hypertrophy. Mild to moderate central canal and bilateral subarticular/lateral recess narrowing, unchanged. Mild bilateral neural foraminal stenosis is again noted. L3-4:  Stable mild to moderate diffuse disc bulge extending into the foraminal regions. Bilateral ligamentum flavum and facet hypertrophy. Mild to moderate central canal and bilateral subarticular/lateral recess narrowing, unchanged. Mild bilateral neural foraminal stenosis. L4-5: Stable diffuse disc bulge and bilateral facet hypertrophy. Mild central canal and bilateral subarticular/lateral recess narrowing. Mild bilateral neural foraminal stenosis similar to prior study. L5-S1: Rudimentary disc space secondary to transitional anatomy. OTHER FINDINGS: Small bilateral renal cortical cysts are again noted. Evidence of hepatomegaly. Impression: 1. Transitional lumbosacral anatomy is again noted with a sacralized L5 vertebral body. Numbering is based on counting down from C2 on the corresponding thoracic spine MRI. The numbering scheme is consistent with the prior MRI of 6/27/2020. 2. Multilevel lumbar degenerative disc disease similar to 6/27/2020 with no new disc herniation. Mild to moderate central canal stenosis is again noted at the L2-3 and L3-4 levels. 3. Small bilateral renal cortical cysts are again noted. Hepatomegaly-correlate with LFTs. Workstation performed: [id number]       I have personally reviewed pertinent reports.    and I have personally reviewed pertinent films in PACS

## 2023-08-21 DIAGNOSIS — F41.0 PANIC ATTACKS: ICD-10-CM

## 2023-08-21 DIAGNOSIS — F41.9 ANXIETY: ICD-10-CM

## 2023-08-21 DIAGNOSIS — F41.1 GAD (GENERALIZED ANXIETY DISORDER): ICD-10-CM

## 2023-08-21 DIAGNOSIS — F33.2 MDD (MAJOR DEPRESSIVE DISORDER), RECURRENT SEVERE, WITHOUT PSYCHOSIS (HCC): ICD-10-CM

## 2023-08-21 RX ORDER — DULOXETIN HYDROCHLORIDE 60 MG/1
60 CAPSULE, DELAYED RELEASE ORAL DAILY
Qty: 90 CAPSULE | Refills: 3 | Status: SHIPPED | OUTPATIENT
Start: 2023-08-21

## 2023-08-21 RX ORDER — ALPRAZOLAM 0.5 MG/1
0.5 TABLET ORAL 2 TIMES DAILY PRN
Qty: 60 TABLET | Refills: 0 | Status: SHIPPED | OUTPATIENT
Start: 2023-08-21

## 2023-08-22 ENCOUNTER — SOCIAL WORK (OUTPATIENT)
Dept: BEHAVIORAL/MENTAL HEALTH CLINIC | Facility: CLINIC | Age: 78
End: 2023-08-22
Payer: COMMERCIAL

## 2023-08-22 DIAGNOSIS — F41.9 ANXIETY: Primary | ICD-10-CM

## 2023-08-22 PROCEDURE — 90837 PSYTX W PT 60 MINUTES: CPT | Performed by: SOCIAL WORKER

## 2023-08-22 NOTE — PSYCH
Behavioral Health Psychotherapy Progress Note    Psychotherapy Provided: Individual Psychotherapy     1. Anxiety            Goals addressed in session: Goal 1     DATA: Jazmine Ziegler dealing with more stress as her daughter is temporarily living with her until her apartment is repaired from mold issues. The infringement on her personal space has been difficult. Has created more tension at home. In addition, continues to deal with multiple physical health issues. Dealing with multiple stressors related to her daughter and her grandchildren. Doing the best she can to manage this stress.  continues tpo struggle with anger/mood issues at times and this creates additional stress. Denies any acute anxiety issues. Denies depressive symptoms. No SI. Processed stressors and their influence on her mood- validation and supportive therapy provided. During this session, this clinician used the following therapeutic modalities: Solution-Focused Therapy and Supportive Psychotherapy    Substance Abuse was addressed during this session. If the client is diagnosed with a co-occurring substance use disorder, please indicate any changes in the frequency or amount of use: none. Stage of change for addressing substance use diagnoses: No substance use/Not applicable    ASSESSMENT:  Elisha Smith presents with a Anxious mood. her affect is Normal range and intensity, which is congruent, with her mood and the content of the session. The client has not made progress on their goals. Elisha Smith presents with a minimal risk of suicide, minimal risk of self-harm, and minimal risk of harm to others. For any risk assessment that surpasses a "low" rating, a safety plan must be developed. A safety plan was indicated: no  If yes, describe in detail n/a    PLAN: Between sessions, Elisha Smith will utilize stress mgmt and relaxation strategies reviewed to manage stress.  Reviewed boundaries to maintain with family to lessen stress and to not be expected to solve every issue for them. . At the next session, the therapist will use Solution-Focused Therapy and Supportive Psychotherapy to address none. Behavioral Health Treatment Plan and Discharge Planning: Seeneftali Toribio is aware of and agrees to continue to work on their treatment plan. They have identified and are working toward their discharge goals.  no    Visit start and stop times: 1:00-2:00    08/22/23

## 2023-08-22 NOTE — BH TREATMENT PLAN
[address] [facility]  1945     Date of Initial Psychotherapy Assessment: 08/22/23  Date of Current Treatment Plan: 08/22/23  Treatment Plan Target Date: 02/22/24  Treatment Plan Expiration Date: N/A    Diagnosis:   1. Anxiety            Area(s) of Need: independence, resiliency    Long Term Goal 1 (in the client's own words): Manage my stress and anxiety    Stage of Change: Action    Target Date for completion: N/A     Anticipated therapeutic modalities: CBT, solution focused therapy, supportive psychotherapy     People identified to complete this goal: Dee Panchal and Rafael Paz LCSW      Objective 1: (identify the means of measuring success in meeting the objective): Dee Panchal will utilize social and creative outlets when able to offset stress and anxiety      Objective 2: (identify the means of measuring success in meeting the objective): Dee Panchal will set and maintain rigid boundaries with family in an effort to reduce exposure to these stressors and to lessen anxiety            I am currently under the care of a [facility] psychiatric provider: no    My [facility] psychiatric provider is: PCP    I am currently taking psychiatric medications: Yes, as prescribed    I feel that I will be ready for discharge from mental health care when I reach the following (measurable goal/objective): 90 consecutive days with no anxiety    For children and adults who have a legal guardian:   Has there been any change to custody orders and/or guardianship status? NA. If yes, attach updated documentation. I have created my Crisis Plan and have been offered a copy of this plan    [address]: Diagnosis and Treatment Plan explained to [facility] acknowledges an understanding of their diagnosis. Jennifer Bermeo agrees to this treatment plan.     I have been offered a copy of this Treatment Plan. yes

## 2023-08-26 ENCOUNTER — APPOINTMENT (EMERGENCY)
Dept: CT IMAGING | Facility: HOSPITAL | Age: 78
End: 2023-08-26
Payer: COMMERCIAL

## 2023-08-26 ENCOUNTER — HOSPITAL ENCOUNTER (EMERGENCY)
Facility: HOSPITAL | Age: 78
Discharge: HOME | End: 2023-08-26
Attending: EMERGENCY MEDICINE
Payer: COMMERCIAL

## 2023-08-26 VITALS
RESPIRATION RATE: 18 BRPM | DIASTOLIC BLOOD PRESSURE: 81 MMHG | HEART RATE: 80 BPM | HEIGHT: 59 IN | TEMPERATURE: 98.6 F | BODY MASS INDEX: 40.72 KG/M2 | SYSTOLIC BLOOD PRESSURE: 189 MMHG | WEIGHT: 202 LBS | OXYGEN SATURATION: 99 %

## 2023-08-26 DIAGNOSIS — N28.9 KIDNEY LESION: ICD-10-CM

## 2023-08-26 DIAGNOSIS — Q62.11 HYDRONEPHROSIS WITH URETEROPELVIC JUNCTION (UPJ) OBSTRUCTION: Primary | ICD-10-CM

## 2023-08-26 DIAGNOSIS — N20.1 URETEROLITHIASIS: ICD-10-CM

## 2023-08-26 LAB
ALBUMIN SERPL BCP-MCNC: 4.5 G/DL (ref 3.5–5)
ALP SERPL-CCNC: 123 U/L (ref 34–104)
ALT SERPL W P-5'-P-CCNC: 15 U/L (ref 7–52)
ANION GAP SERPL CALCULATED.3IONS-SCNC: 10 MMOL/L
AST SERPL W P-5'-P-CCNC: 13 U/L (ref 13–39)
BACTERIA UR QL AUTO: ABNORMAL /HPF
BASOPHILS # BLD AUTO: 0.04 THOUSANDS/ÂΜL (ref 0–0.1)
BASOPHILS NFR BLD AUTO: 0 % (ref 0–1)
BILIRUB SERPL-MCNC: 0.47 MG/DL (ref 0.2–1)
BILIRUB UR QL STRIP: NEGATIVE
BUN SERPL-MCNC: 25 MG/DL (ref 5–25)
CALCIUM SERPL-MCNC: 10 MG/DL (ref 8.4–10.2)
CHLORIDE SERPL-SCNC: 102 MMOL/L (ref 96–108)
CLARITY UR: CLEAR
CO2 SERPL-SCNC: 22 MMOL/L (ref 21–32)
COLOR UR: COLORLESS
CREAT SERPL-MCNC: 1.73 MG/DL (ref 0.6–1.3)
EOSINOPHIL # BLD AUTO: 0.07 THOUSAND/ÂΜL (ref 0–0.61)
EOSINOPHIL NFR BLD AUTO: 1 % (ref 0–6)
ERYTHROCYTE [DISTWIDTH] IN BLOOD BY AUTOMATED COUNT: 15.9 % (ref 11.6–15.1)
GFR SERPL CREATININE-BSD FRML MDRD: 28 ML/MIN/1.73SQ M
GLUCOSE SERPL-MCNC: 325 MG/DL (ref 65–140)
GLUCOSE UR STRIP-MCNC: ABNORMAL MG/DL
HCT VFR BLD AUTO: 38.7 % (ref 34.8–46.1)
HGB BLD-MCNC: 12.3 G/DL (ref 11.5–15.4)
HGB UR QL STRIP.AUTO: ABNORMAL
IMM GRANULOCYTES # BLD AUTO: 0.05 THOUSAND/UL (ref 0–0.2)
IMM GRANULOCYTES NFR BLD AUTO: 1 % (ref 0–2)
KETONES UR STRIP-MCNC: NEGATIVE MG/DL
LEUKOCYTE ESTERASE UR QL STRIP: NEGATIVE
LIPASE SERPL-CCNC: 31 U/L (ref 11–82)
LYMPHOCYTES # BLD AUTO: 2.05 THOUSANDS/ÂΜL (ref 0.6–4.47)
LYMPHOCYTES NFR BLD AUTO: 21 % (ref 14–44)
MCH RBC QN AUTO: 27.4 PG (ref 26.8–34.3)
MCHC RBC AUTO-ENTMCNC: 31.8 G/DL (ref 31.4–37.4)
MCV RBC AUTO: 86 FL (ref 82–98)
MONOCYTES # BLD AUTO: 0.66 THOUSAND/ÂΜL (ref 0.17–1.22)
MONOCYTES NFR BLD AUTO: 7 % (ref 4–12)
NEUTROPHILS # BLD AUTO: 6.87 THOUSANDS/ÂΜL (ref 1.85–7.62)
NEUTS SEG NFR BLD AUTO: 70 % (ref 43–75)
NITRITE UR QL STRIP: NEGATIVE
NON-SQ EPI CELLS URNS QL MICRO: ABNORMAL /HPF
NRBC BLD AUTO-RTO: 0 /100 WBCS
PH UR STRIP.AUTO: 5.5 [PH]
PLATELET # BLD AUTO: 260 THOUSANDS/UL (ref 149–390)
PMV BLD AUTO: 11 FL (ref 8.9–12.7)
POTASSIUM SERPL-SCNC: 4.7 MMOL/L (ref 3.5–5.3)
PROT SERPL-MCNC: 8.6 G/DL (ref 6.4–8.4)
PROT UR STRIP-MCNC: ABNORMAL MG/DL
RBC # BLD AUTO: 4.49 MILLION/UL (ref 3.81–5.12)
RBC #/AREA URNS AUTO: ABNORMAL /HPF
SODIUM SERPL-SCNC: 134 MMOL/L (ref 135–147)
SP GR UR STRIP.AUTO: 1.01 (ref 1–1.03)
UROBILINOGEN UR STRIP-ACNC: <2 MG/DL
WBC # BLD AUTO: 9.74 THOUSAND/UL (ref 4.31–10.16)
WBC #/AREA URNS AUTO: ABNORMAL /HPF

## 2023-08-26 PROCEDURE — 80053 COMPREHEN METABOLIC PANEL: CPT | Performed by: EMERGENCY MEDICINE

## 2023-08-26 PROCEDURE — 36415 COLL VENOUS BLD VENIPUNCTURE: CPT

## 2023-08-26 PROCEDURE — 74176 CT ABD & PELVIS W/O CONTRAST: CPT

## 2023-08-26 PROCEDURE — 83690 ASSAY OF LIPASE: CPT | Performed by: EMERGENCY MEDICINE

## 2023-08-26 PROCEDURE — G1004 CDSM NDSC: HCPCS

## 2023-08-26 PROCEDURE — 99284 EMERGENCY DEPT VISIT MOD MDM: CPT

## 2023-08-26 PROCEDURE — 99284 EMERGENCY DEPT VISIT MOD MDM: CPT | Performed by: PHYSICIAN ASSISTANT

## 2023-08-26 PROCEDURE — 81001 URINALYSIS AUTO W/SCOPE: CPT | Performed by: PHYSICIAN ASSISTANT

## 2023-08-26 PROCEDURE — 85025 COMPLETE CBC W/AUTO DIFF WBC: CPT | Performed by: EMERGENCY MEDICINE

## 2023-08-26 RX ORDER — TAMSULOSIN HYDROCHLORIDE 0.4 MG/1
0.4 CAPSULE ORAL
Qty: 9 CAPSULE | Refills: 0 | Status: SHIPPED | OUTPATIENT
Start: 2023-08-27

## 2023-08-26 RX ORDER — TAMSULOSIN HYDROCHLORIDE 0.4 MG/1
0.4 CAPSULE ORAL ONCE
Status: COMPLETED | OUTPATIENT
Start: 2023-08-26 | End: 2023-08-26

## 2023-08-26 RX ADMIN — TAMSULOSIN HYDROCHLORIDE 0.4 MG: 0.4 CAPSULE ORAL at 22:50

## 2023-08-27 ENCOUNTER — HOSPITAL ENCOUNTER (OUTPATIENT)
Dept: MRI IMAGING | Facility: HOSPITAL | Age: 78
Discharge: HOME | End: 2023-08-27
Payer: COMMERCIAL

## 2023-08-27 DIAGNOSIS — G99.2 STENOSIS OF CERVICAL SPINE WITH MYELOPATHY (HCC): ICD-10-CM

## 2023-08-27 DIAGNOSIS — M48.02 STENOSIS OF CERVICAL SPINE WITH MYELOPATHY (HCC): ICD-10-CM

## 2023-08-27 PROCEDURE — G1004 CDSM NDSC: HCPCS

## 2023-08-27 PROCEDURE — 72141 MRI NECK SPINE W/O DYE: CPT

## 2023-08-27 NOTE — DISCHARGE INSTRUCTIONS
Please return to the emergency department for worsening symptoms including chest pain, shortness of breath, dizziness, lightheadedness, fever greater than 103, severe pain, inability to walk, fainting episodes, etc.. Please follow-up with your family practice provider as soon as possible. I have sent medications over to the pharmacy for your symptoms. Please take as directed. Please continue drinking lots of fluids at home. This will help you pass your kidney stone. I have given you a referral to a urologist.  Please call and make an appointment. You have a lesion on your kidney. Please follow-up with a urologist for categorization. You may need an ultrasound.

## 2023-08-27 NOTE — ED PROVIDER NOTES
History  Chief Complaint   Patient presents with   • Abdominal Pain     Pt presenting with generalized abdominal pain that radiates to her left flank area. Pt was told she has a mass on her kidneys at [facility] a few months ago. Pt c/o urinary frequency. Denies any blood in her urine, N/V. This is a 71-year-old female with past medical history significant for CKD presenting to the emergency department today for generalized abdominal pain that radiates to the left flank. Patient notes this was ongoing for approximately 45 minutes ago but stopped after she gas while at home. He also had a formed bowel movement which her pain. She denies any nausea or vomiting. She has no chest pain or shortness of breath. She has no fevers or chills. She denies any dysuria, hematuria, or foul-smelling urine. She has no urinary frequency. The patient does note that she has a "mass" on one of her kidneys that was seen on one of her imaging studies at [facility] but is uncertain what this "mass" is. The patient denies other complaints at this time. History provided by:  Patient   used: No    Abdominal Pain  Pain location:  Generalized  Pain quality: cramping    Pain radiates to:  L flank  Pain severity:  Moderate  Onset quality:  Sudden  Duration: 45 minutes. Progression:  Resolved  Chronicity:  New  Relieved by:  Nothing  Worsened by:  Nothing  Ineffective treatments:  None tried  Associated symptoms: no anorexia, no belching, no chest pain, no chills, no constipation, no cough, no diarrhea, no dysuria, no fatigue, no fever, no flatus, no hematuria, no melena, no nausea, no shortness of breath, no sore throat and no vomiting        Prior to Admission Medications   Prescriptions Last Dose Informant Patient Reported? Taking? ALPRAZolam (XANAX) 0.5 mg tablet   No No   Sig: TAKE 1 TABLET (0.5 MG TOTAL) BY MOUTH 2 (TWO) TIMES A DAY AS NEEDED FOR ANXIETY.    B-D UF III MINI PEN NEEDLES 31G X 5 MM MISC  Self Yes No   BAQSIMI TWO PACK 3 MG/DOSE POWD  Self Yes No   Sig: Use as directed    BD Insulin Syringe U/F 31G X 5/16" 0.5 ML MISC  Self Yes No   Si (four) times a day As directed   Coenzyme Q10 (Co Q-10) 200 MG CAPS  Self Yes No   Sig: Take by mouth in the morning   Continuous Blood Gluc Sensor (Dexcom G6 Sensor) MISC   Yes No   Sig: Use 1 each   DULoxetine (CYMBALTA) 60 mg delayed release capsule   No No   Sig: TAKE 1 CAPSULE BY MOUTH EVERY DAY   Docusate Sodium (DSS) 100 MG CAPS   Yes No   Sig: Take 1 capsule by mouth every 12 (twelve) hours   Insulin Disposable Pump (OmniPod Dash 5 Pack Pods) MISC  Self Yes No   Sig: USE 1 POD EVERY 1.5 DAYS   NovoLOG 100 UNIT/ML SOLN  Self Yes No   Sig: INFUSE UNDER THE SKIN VIA INSULIN PUMP AS DIRECTED. TOTAL DAILY DOSE  UNITS   Ozempic, 0.25 or 0.5 MG/DOSE, 2 MG/3ML injection pen   Yes No   Sig: INJECT 0.25 MG UNDER THE SKIN ONCE A WEEK.    aspirin 81 MG tablet  Self Yes No   Sig: Take 81 mg by mouth daily   cholecalciferol (VITAMIN D3) 1,000 units tablet  Self Yes No   Sig: Take 2,000 Units by mouth daily   diclofenac sodium (VOLTAREN) 1 %  Self No No   Sig: Apply 2 g topically 4 (four) times a day   diltiazem (TIAZAC) 300 MG 24 hr capsule  Self Yes No   Sig: Take 300 mg by mouth daily   fluocinonide (LIDEX) 0.05 % external solution   Yes No   fluticasone (FLONASE) 50 mcg/act nasal spray  Self No No   Si spray into each nostril daily   furosemide (LASIX) 20 mg tablet Not Taking  Yes No   Patient not taking: Reported on 2023   guaiFENesin (MUCINEX) 600 mg 12 hr tablet  Self No No   Sig: Take 2 tablets (1,200 mg total) by mouth every 12 (twelve) hours   Patient not taking: Reported on 2023   insulin aspart (NovoLOG) 100 units/mL injection  Self No No   Sig: Inject 12 Units under the skin 3 (three) times a day before meals   irbesartan (AVAPRO) 300 mg tablet  Self Yes No   Sig: Take 300 mg by mouth   isosorbide mononitrate (IMDUR) 60 mg 24 hr tablet  Self Yes No   Sig: TAKE 1.5 TABs AT NIGHT   levothyroxine 50 mcg tablet  Self Yes No   Sig: Take 50 mcg by mouth daily   naloxone (NARCAN) 4 mg/0.1 mL nasal spray  Self No No   Sig: Administer 1 spray into a nostril. If no response after 2-3 minutes, give another dose in the other nostril using a new spray. nitroglycerin (NITROLINGUAL) 0.4 mg/spray spray  Self Yes No   Sig: USE ONE SPRAY Q 5 MINUTES AS NEEDED FOR CHEST PAIN. IF NO RELIEF, CALL 911.    rosuvastatin (CRESTOR) 20 MG tablet  Self Yes No   Sig: Take 20 mg by mouth every evening        Facility-Administered Medications: None       Past Medical History:   Diagnosis Date   • Acute embolism and thrombosis of unspecified deep veins of unspecified lower extremity (HCC)     Last Assessed:  5/18/17   • Anemia    • Anosmia    • Anxiety    • Arthritis    • Asthma    • Back pain    • Bilateral macular retinal edema    • CAD (coronary artery disease)    • Cataract    • Cervical disc herniation    • Cervical radiculopathy    • Cervical spinal stenosis    • Cervical spondylolysis    • Chronic kidney disease    • Chronic mastoiditis    • Colon polyp    • Complex endometrial hyperplasia    • Depression    • Diabetes mellitus ([address])    • Disease of thyroid gland    • DVT (deep venous thrombosis) ([address])    • DVT (deep venous thrombosis) ([address]) 6/16/2017   • Fibromyalgia    • Fibromyalgia, primary    • Hyperlipidemia    • Hypertension    • Hypothyroid    • Kidney stone    • Lumbar radiculopathy    • Neck pain    • Obese    • PONV (postoperative nausea and vomiting)    • Shingles 07/01/2021   • Spinal stenosis    • Stomach ulcer    • Thyroid disease        Past Surgical History:   Procedure Laterality Date   • BACK SURGERY     • CARPAL TUNNEL RELEASE     • CATARACT EXTRACTION     • CHOLECYSTECTOMY     • COLONOSCOPY     • CORONARY ANGIOPLASTY     • CORONARY ARTERY BYPASS GRAFT     • CYSTOSCOPY N/A 6/20/2017    Procedure: CYSTOSCOPY;  Surgeon: Jordan MD Clayton;  Location: [facility];  Service: Gynecology Oncology   • CYSTOSCOPY     • UT LAPS TOTAL HYSTERECT 250 GM/< W/RMVL TUBE/OVARY N/A 2017    Procedure: ROBOTIC HYSTERECTOMY; BILATERAL SALPINO-OOPHERECTOMY; umbilical hernia repair.;  Surgeon: Bibi Ybarra MD;  Location: [facility];  Service: Gynecology Oncology   • UT REPAIR FIRST ABDOMINAL WALL HERNIA N/A 2022    Procedure: REPAIR HERNIA INCISIONAL;  Surgeon: Jo Zuniga DO;  Location: [facility];  Service: General   • TONSILECTOMY AND ADNOIDECTOMY     • TONSILLECTOMY     • UMBILICAL HERNIA REPAIR         Family History   Problem Relation Age of Onset   • Arthritis Mother    • Leukemia Mother    • Other Mother         Anxiety, major depressive disorder, recurrent episode with atypical features   • Coronary artery disease Father         Heart problem   • Diabetes Father    • Other Father         Infectious disease   • Alzheimer's disease Maternal Grandmother    • Other Maternal Grandfather         Heart problem   • Other Daughter         Anxiety, major depressive disorder, recurrent episode with atypical features   • Alcohol abuse Other         Grandparent   • Cancer Family    • Diabetes Family    • Hypertension Family    • Other Family         Reported prior back trouble, thyroid disorder     I have reviewed and agree with the history as documented. E-Cigarette/Vaping   • E-Cigarette Use Never User      E-Cigarette/Vaping Substances   • Nicotine No    • THC No    • CBD No    • Flavoring No      Social History     Tobacco Use   • Smoking status: Former     Packs/day: 1.00     Years: 6.00     Total pack years: 6.00     Types: Cigarettes     Quit date: 47     Years since quittin.6   • Smokeless tobacco: Never   • Tobacco comments:     Smoked about a half a pack for about 4 yrs. Quite about 50 yrs ago.    Vaping Use   • Vaping Use: Never used   Substance Use Topics   • Alcohol use: No   • Drug use: No       Review of Systems Constitutional: Negative for appetite change, chills, diaphoresis, fatigue and fever. HENT: Negative for sore throat. Eyes: Negative for visual disturbance. Respiratory: Negative for cough, chest tightness, shortness of breath and wheezing. Cardiovascular: Negative for chest pain, palpitations and leg swelling. Gastrointestinal: Positive for abdominal pain. Negative for anorexia, constipation, diarrhea, flatus, melena, nausea and vomiting. Genitourinary: Negative for dysuria and hematuria. Musculoskeletal: Negative for neck pain and neck stiffness. Skin: Negative for rash and wound. Neurological: Negative for dizziness, seizures, syncope, weakness, light-headedness, numbness and headaches. Psychiatric/Behavioral: Negative for confusion. All other systems reviewed and are negative. Physical Exam  Physical Exam  Vitals and nursing note reviewed. Constitutional:       General: She is not in acute distress. Appearance: Normal appearance. She is normal weight. She is not ill-appearing, toxic-appearing or diaphoretic. HENT:      Head: Normocephalic and atraumatic. Nose: Nose normal. No congestion or rhinorrhea. Mouth/Throat:      Mouth: Mucous membranes are moist.      Pharynx: No oropharyngeal exudate or posterior oropharyngeal erythema. Eyes:      General: No scleral icterus. Right eye: No discharge. Left eye: No discharge. Extraocular Movements: Extraocular movements intact. Pupils: Pupils are equal, round, and reactive to light. Cardiovascular:      Rate and Rhythm: Normal rate and regular rhythm. Pulses: Normal pulses. Heart sounds: Normal heart sounds. No murmur heard. No friction rub. No gallop. Pulmonary:      Effort: Pulmonary effort is normal. No respiratory distress. Breath sounds: Normal breath sounds. No stridor. No wheezing, rhonchi or rales. Chest:      Chest wall: No tenderness.    Abdominal:      General: Abdomen is flat. There is no distension. Palpations: Abdomen is soft. Tenderness: There is no abdominal tenderness. There is no right CVA tenderness, left CVA tenderness, guarding or rebound. Comments: The patient's abdomen is soft, nontender, nondistended, and without organomegaly; no rebound, Rovsing, or McBurney's point tenderness; the patient is nonperitoneal   Musculoskeletal:         General: Normal range of motion. Cervical back: Normal range of motion. No tenderness. Right lower leg: No edema. Left lower leg: No edema. Skin:     General: Skin is warm and dry. Capillary Refill: Capillary refill takes less than 2 seconds. Coloration: Skin is not jaundiced or pale. Neurological:      General: No focal deficit present. Mental Status: She is alert and oriented to person, place, and time. Mental status is at baseline.    Psychiatric:         Mood and Affect: Mood normal.         Behavior: Behavior normal.         Vital Signs  ED Triage Vitals   Temperature Pulse Respirations Blood Pressure SpO2   08/26/23 2022 08/26/23 2017 08/26/23 2017 08/26/23 2017 08/26/23 2017   98.6 °F (37 °C) 80 18 (!) 189/81 99 %      Temp Source Heart Rate Source Patient Position - Orthostatic VS BP Location FiO2 (%)   08/26/23 2022 -- 08/26/23 2017 08/26/23 2017 --   Oral  Sitting Right arm       Pain Score       --                  Vitals:    08/26/23 2017   BP: (!) 189/81   Pulse: 80   Patient Position - Orthostatic VS: Sitting         Visual Acuity      ED Medications  Medications   tamsulosin (FLOMAX) capsule 0.4 mg (0.4 mg Oral Given 8/26/23 2250)       Diagnostic Studies  Results Reviewed     Procedure Component Value Units Date/Time    Comprehensive metabolic panel [[id number]]  (Abnormal) Collected: 08/26/23 2210    Lab Status: Final result Specimen: Blood from Arm, Right Updated: 08/26/23 2309     Sodium 134 mmol/L      Potassium 4.7 mmol/L      Chloride 102 mmol/L      CO2 22 mmol/L      ANION GAP 10 mmol/L      BUN 25 mg/dL      Creatinine 1.73 mg/dL      Glucose 325 mg/dL      Calcium 10.0 mg/dL      AST 13 U/L      ALT 15 U/L      Alkaline Phosphatase 123 U/L      Total Protein 8.6 g/dL      Albumin 4.5 g/dL      Total Bilirubin 0.47 mg/dL      eGFR 28 ml/min/1.73sq m     Narrative:      National Kidney Disease Foundation guidelines for Chronic Kidney Disease (CKD):   •  Stage 1 with normal or high GFR (GFR > 90 mL/min/1.73 square meters)  •  Stage 2 Mild CKD (GFR = 60-89 mL/min/1.73 square meters)  •  Stage 3A Moderate CKD (GFR = 45-59 mL/min/1.73 square meters)  •  Stage 3B Moderate CKD (GFR = 30-44 mL/min/1.73 square meters)  •  Stage 4 Severe CKD (GFR = 15-29 mL/min/1.73 square meters)  •  Stage 5 End Stage CKD (GFR <15 mL/min/1.73 square meters)  Note: GFR calculation is accurate only with a steady state creatinine    Lipase [[id number]]  (Normal) Collected: 08/26/23 2210    Lab Status: Final result Specimen: Blood from Arm, Right Updated: 08/26/23 2309     Lipase 31 u/L     Urine Microscopic [[id number]]  (Abnormal) Collected: 08/26/23 2210    Lab Status: Final result Specimen: Urine, Clean Catch Updated: 08/26/23 2250     RBC, UA 10-20 /hpf      WBC, UA 1-2 /hpf      Epithelial Cells None Seen /hpf      Bacteria, UA None Seen /hpf     UA w Reflex to Microscopic w Reflex to Culture [[id number]]  (Abnormal) Collected: 08/26/23 2210    Lab Status: Final result Specimen: Urine, Clean Catch Updated: 08/26/23 2230     Color, UA Colorless     Clarity, UA Clear     Specific Gravity, UA 1.008     pH, UA 5.5     Leukocytes, UA Negative     Nitrite, UA Negative     Protein, UA 50 (1+) mg/dl      Glucose, UA 1000 (1%) mg/dl      Ketones, UA Negative mg/dl      Urobilinogen, UA <2.0 mg/dl      Bilirubin, UA Negative     Occult Blood, UA Small    CBC and differential [[id number]]  (Abnormal) Collected: 08/26/23 2032    Lab Status: Final result Specimen: Blood from Arm, Right Updated: 08/26/23 2040     WBC 9.74 Thousand/uL      RBC 4.49 Million/uL      Hemoglobin 12.3 g/dL      Hematocrit 38.7 %      MCV 86 fL      MCH 27.4 pg      MCHC 31.8 g/dL      RDW 15.9 %      MPV 11.0 fL      Platelets 674 Thousands/uL      nRBC 0 /100 WBCs      Neutrophils Relative 70 %      Immat GRANS % 1 %      Lymphocytes Relative 21 %      Monocytes Relative 7 %      Eosinophils Relative 1 %      Basophils Relative 0 %      Neutrophils Absolute 6.87 Thousands/µL      Immature Grans Absolute 0.05 Thousand/uL      Lymphocytes Absolute 2.05 Thousands/µL      Monocytes Absolute 0.66 Thousand/µL      Eosinophils Absolute 0.07 Thousand/µL      Basophils Absolute 0.04 Thousands/µL                  CT abdomen pelvis wo contrast   Final Result by Miller Pierre MD (08/26 2236)      There is mild right hydronephrosis and right hydroureter related to a 3 mm calculus in the right ureterovesical junction. There is a 1.1 cm hyperdense lesion arising from the lateral aspect lower pole right kidney. This was not clearly demonstrable on the April 18, 2017 examination, however, was described on the January 12, 2023 report from [facility]. Nonemergent outpatient renal ultrasound is recommended for further evaluation. There is infiltration of the superficial subcutaneous fat of the lower left anterior abdominal wall. This may possibly be related to recent injection procedure. Clinical correlation recommended. Atherosclerosis. Colonic diverticulosis without evidence of acute diverticulitis. No bowel obstruction. Other nonemergent findings as above. This examination demonstrates findings for which clinical and imaging follow-up is recommended and was logged as such in EPIC. The study was marked in [facility] for immediate notification.             Workstation performed: [id number]                    Procedures  Procedures         ED Course  ED Course as of 08/27/23 0105   Sat Aug 26, 2023 2310 Creatinine(!): 1.73  Baseline appears to be between 1.3 and 1.9. SBIRT 22yo+    Flowsheet Row Most Recent Value   Initial Alcohol Screen: US AUDIT-C     1. How often do you have a drink containing alcohol? 0 Filed at: 08/26/2023 2202   2. How many drinks containing alcohol do you have on a typical day you are drinking? 0 Filed at: 08/26/2023 2202   3b. FEMALE Any Age, or MALE 65+: How often do you have 4 or more drinks on one occassion? 0 Filed at: 08/26/2023 2202   Audit-C Score 0 Filed at: 08/26/2023 2202   GERMAN: How many times in the past year have you. .. Used an illegal drug or used a prescription medication for non-medical reasons? Never Filed at: 08/26/2023 2202                    Medical Decision Making  This is a 69-year-old female presenting to the emergency department today for generalized abdominal pain. It was ongoing for 45 minutes but has since resolved with a bowel movement. The patient has no urinary symptoms. Vital signs show hypertension. On physical examination, the patient's abdomen is soft, nontender, nondistended, and without organomegaly. The patient has hematuria on urinalysis. No evidence of urinary tract infection. Mild elevation to creatinine at 1.73 but this is within the patient's baseline creatinine. CT abdomen and pelvis shows mild right hydronephrosis and right hydroureter related to a 3 mm calculus. The patient does have a hyperdense lesion on the kidney for which a nonpatient outpatient renal ultrasound is recommended for further evaluation. The patient was dosed with Flomax while here in the emergency department. The patient is stable for discharge at this time. Follow-up with urology. Patient made aware of incidental findings. Flomax sent to the patient's pharmacy. Strict return precautions were given. Recommend PCP follow-up as soon as possible.  The patient and/or patient's proxy verify their understanding and agree to the plan at this time. All questions answered to the patient and/or their proxy's satisfaction. All labs reviewed and utilized in the medical decision making process (if labs were ordered). Portions of the record may have been created with voice recognition software.  Occasional wrong word or "sound a like" substitutions may have occurred due to the inherent limitations of voice recognition software.  Read the chart carefully and recognize, using context, where substitutions have occurred. Hydronephrosis with ureteropelvic junction (UPJ) obstruction: complicated acute illness or injury  Kidney lesion: chronic illness or injury  Ureterolithiasis: complicated acute illness or injury  Amount and/or Complexity of Data Reviewed  Labs: ordered. Decision-making details documented in ED Course. Radiology: ordered. Decision-making details documented in ED Course. Risk  Prescription drug management. Disposition  Final diagnoses:   Ureterolithiasis   Hydronephrosis with ureteropelvic junction (UPJ) obstruction   Kidney lesion     Time reflects when diagnosis was documented in both MDM as applicable and the Disposition within this note     Time User Action Codes Description Comment    8/26/2023 11:11 PM Scot Cushing Add [N20.1] Ureterolithiasis     8/26/2023 11:12 PM Scot Cushing Add [Q62.11] Hydronephrosis with ureteropelvic junction (UPJ) obstruction     8/26/2023 11:12 PM Floyd Bullocks [N20.1] Ureterolithiasis     8/26/2023 11:12 PM Floyd Bullocks [Q62.11] Hydronephrosis with ureteropelvic junction (UPJ) obstruction     8/26/2023 11:12 PM Shailesh Munoz1 [address] [N28.9] Kidney lesion       ED Disposition     ED Disposition   Discharge    Condition   Stable    Date/Time   Sat Aug 26, 2023 11:11 PM    [facility] discharge to home/self care.                Follow-up Information     Follow up With Specialties Details Why Contact Info Additional [address] [facility], DO Internal Medicine Schedule an appointment as soon as possible for a visit   [address] [facility]        [address] [facility] Emergency Department Emergency Medicine Go to  If symptoms worsen [address] Emergency Department, [facility] ([place]), [facility], [address] [facility] [place] ([place]) Urology Schedule an appointment as soon as possible for a visit   [address] Minda Castillo [address] [place] [phone] 110[address] [facility] [place] ([place]), [address] [facility] ([place]), [facility], [address] [place]          Discharge Medication List as of 8/26/2023 11:14 PM      START taking these medications    Details   tamsulosin (FLOMAX) 0.4 mg Take 1 capsule (0.4 mg total) by mouth daily with dinner Do not start before August 27, 2023., Starting Sun 8/27/2023, Normal         CONTINUE these medications which have NOT CHANGED    Details   ALPRAZolam (XANAX) 0.5 mg tablet TAKE 1 TABLET (0.5 MG TOTAL) BY MOUTH 2 (TWO) TIMES A DAY AS NEEDED FOR ANXIETY., Starting Mon 8/21/2023, Normal      aspirin 81 MG tablet Take 81 mg by mouth daily, Historical Med      B-D UF III MINI PEN NEEDLES 31G X 5 MM MISC Starting Fri 6/5/2020, Historical Med      BAQSIMI TWO PACK 3 MG/DOSE POWD Use as directed , Starting Tue 10/8/2019, Historical Med      BD Insulin Syringe U/F 31G X 5/16" 0.5 ML MISC 4 (four) times a day As directed, Starting Mon 8/22/2022, Historical Med      cholecalciferol (VITAMIN D3) 1,000 units tablet Take 2,000 Units by mouth daily, Historical Med      Coenzyme Q10 (Co Q-10) 200 MG CAPS Take by mouth in the morning, Historical Med      Continuous Blood Gluc Sensor (Dexcom G6 Sensor) MISC Use 1 each, Starting Fri 7/28/2023, Historical Med      diclofenac sodium (VOLTAREN) 1 % Apply 2 g topically 4 (four) times a day, Starting Tue 1/15/2019, Normal      diltiazem (TIAZAC) 300 MG 24 hr capsule Take 300 mg by mouth daily, Historical Med      Docusate Sodium (DSS) 100 MG CAPS Take 1 capsule by mouth every 12 (twelve) hours, Historical Med      DULoxetine (CYMBALTA) 60 mg delayed release capsule TAKE 1 CAPSULE BY MOUTH EVERY DAY, Starting Mon 8/21/2023, Normal      fluocinonide (LIDEX) 0.05 % external solution Historical Med      fluticasone (FLONASE) 50 mcg/act nasal spray 1 spray into each nostril daily, Starting Thu 2/23/2023, No Print      furosemide (LASIX) 20 mg tablet Historical Med      guaiFENesin (MUCINEX) 600 mg 12 hr tablet Take 2 tablets (1,200 mg total) by mouth every 12 (twelve) hours, Starting Thu 2/23/2023, No Print      insulin aspart (NovoLOG) 100 units/mL injection Inject 12 Units under the skin 3 (three) times a day before meals, Starting Thu 6/22/2017, No Print      Insulin Disposable Pump (OmniPod Dash 5 Pack Pods) MISC USE 1 POD EVERY 1.5 DAYS, Historical Med      irbesartan (AVAPRO) 300 mg tablet Take 300 mg by mouth, Starting Mon 10/10/2022, Until Tue 10/10/2023 at 2359, Historical Med      isosorbide mononitrate (IMDUR) 60 mg 24 hr tablet TAKE 1.5 TABs AT NIGHT, Historical Med      levothyroxine 50 mcg tablet Take 50 mcg by mouth daily, Historical Med      naloxone (NARCAN) 4 mg/0.1 mL nasal spray Administer 1 spray into a nostril. If no response after 2-3 minutes, give another dose in the other nostril using a new spray., Normal      nitroglycerin (NITROLINGUAL) 0.4 mg/spray spray USE ONE SPRAY Q 5 MINUTES AS NEEDED FOR CHEST PAIN. IF NO RELIEF, CALL 911., Historical Med      NovoLOG 100 UNIT/ML SOLN INFUSE UNDER THE SKIN VIA INSULIN PUMP AS DIRECTED.  TOTAL DAILY DOSE  UNITS, Historical Med      Ozempic, 0.25 or 0.5 MG/DOSE, 2 MG/3ML injection pen INJECT 0.25 MG UNDER THE SKIN ONCE A WEEK., Historical Med      rosuvastatin (CRESTOR) 20 MG tablet Take 20 mg by mouth every evening , Starting Tue 12/4/2018, Historical Med                 PDMP Review       Value Time User    PDMP Reviewed  Yes 8/21/2023  2:15 PM Yves Kirkland DO          ED Provider  Electronically Signed by           Te Ignacio PA-C  08/27/23 0105

## 2023-08-28 ENCOUNTER — VBI (OUTPATIENT)
Dept: INTERNAL MEDICINE CLINIC | Facility: CLINIC | Age: 78
End: 2023-08-28

## 2023-08-28 NOTE — TELEPHONE ENCOUNTER
08/28/23 10:48 AM    Patient contacted post ED visit, [facility] department spoke with patient/caregiver and outreach was successful. Thank you.   Eligio Zafar [facility]

## 2023-08-28 NOTE — TELEPHONE ENCOUNTER
08/28/23 9:19 AM    Patient contacted post ED visit, first outreach attempt made. Message was left for patient to return a call to the [facility] Department at [facility]: Phone [phone]. Thank you.   Jayla Lawton  [facility]

## 2023-08-31 ENCOUNTER — HOSPITAL ENCOUNTER (INPATIENT)
Facility: HOSPITAL | Age: 78
LOS: 1 days | Discharge: HOME | End: 2023-09-02
Attending: EMERGENCY MEDICINE | Admitting: INTERNAL MEDICINE
Payer: COMMERCIAL

## 2023-08-31 ENCOUNTER — APPOINTMENT (OUTPATIENT)
Dept: ULTRASOUND IMAGING | Facility: HOSPITAL | Age: 78
End: 2023-08-31
Payer: COMMERCIAL

## 2023-08-31 ENCOUNTER — APPOINTMENT (EMERGENCY)
Dept: RADIOLOGY | Facility: HOSPITAL | Age: 78
End: 2023-08-31
Payer: COMMERCIAL

## 2023-08-31 ENCOUNTER — APPOINTMENT (OUTPATIENT)
Dept: VASCULAR ULTRASOUND | Facility: HOSPITAL | Age: 78
End: 2023-08-31
Payer: COMMERCIAL

## 2023-08-31 DIAGNOSIS — N20.1 CALCULUS OF URETEROVESICAL JUNCTION (UVJ): ICD-10-CM

## 2023-08-31 DIAGNOSIS — N13.30 HYDRONEPHROSIS: ICD-10-CM

## 2023-08-31 DIAGNOSIS — R10.9 ABDOMINAL PAIN: Primary | ICD-10-CM

## 2023-08-31 DIAGNOSIS — D72.829 LEUKOCYTOSIS: ICD-10-CM

## 2023-08-31 DIAGNOSIS — K59.00 CONSTIPATION, UNSPECIFIED CONSTIPATION TYPE: ICD-10-CM

## 2023-08-31 PROBLEM — R79.89 ELEVATED D-DIMER: Status: ACTIVE | Noted: 2023-08-31

## 2023-08-31 PROBLEM — E83.52 HYPERCALCEMIA: Status: ACTIVE | Noted: 2023-08-31

## 2023-08-31 PROBLEM — R10.84 GENERALIZED ABDOMINAL PAIN: Status: ACTIVE | Noted: 2023-08-31

## 2023-08-31 LAB
2HR DELTA HS TROPONIN: -2 NG/L
ALBUMIN SERPL BCP-MCNC: 4.5 G/DL (ref 3.5–5)
ALP SERPL-CCNC: 115 U/L (ref 34–104)
ALT SERPL W P-5'-P-CCNC: 16 U/L (ref 7–52)
ANION GAP SERPL CALCULATED.3IONS-SCNC: 14 MMOL/L
AST SERPL W P-5'-P-CCNC: 27 U/L (ref 13–39)
ATRIAL RATE: 96 BPM
BACTERIA UR QL AUTO: ABNORMAL /HPF
BASE EX.OXY STD BLDV CALC-SCNC: 86 % (ref 60–80)
BASE EXCESS BLDV CALC-SCNC: -3.3 MMOL/L
BASOPHILS # BLD AUTO: 0.06 THOUSANDS/ÂΜL (ref 0–0.1)
BASOPHILS NFR BLD AUTO: 1 % (ref 0–1)
BILIRUB SERPL-MCNC: 0.62 MG/DL (ref 0.2–1)
BILIRUB UR QL STRIP: NEGATIVE
BUN SERPL-MCNC: 24 MG/DL (ref 5–25)
CALCIUM SERPL-MCNC: 10.4 MG/DL (ref 8.4–10.2)
CARDIAC TROPONIN I PNL SERPL HS: 10 NG/L
CARDIAC TROPONIN I PNL SERPL HS: 8 NG/L
CHLORIDE SERPL-SCNC: 103 MMOL/L (ref 96–108)
CLARITY UR: CLEAR
CO2 SERPL-SCNC: 19 MMOL/L (ref 21–32)
COLOR UR: ABNORMAL
CREAT SERPL-MCNC: 1.86 MG/DL (ref 0.6–1.3)
D DIMER PPP FEU-MCNC: 1.1 UG/ML FEU
EOSINOPHIL # BLD AUTO: 0.01 THOUSAND/ÂΜL (ref 0–0.61)
EOSINOPHIL NFR BLD AUTO: 0 % (ref 0–6)
ERYTHROCYTE [DISTWIDTH] IN BLOOD BY AUTOMATED COUNT: 15.8 % (ref 11.6–15.1)
GFR SERPL CREATININE-BSD FRML MDRD: 25 ML/MIN/1.73SQ M
GLUCOSE SERPL-MCNC: 147 MG/DL (ref 65–140)
GLUCOSE SERPL-MCNC: 172 MG/DL (ref 65–140)
GLUCOSE SERPL-MCNC: 281 MG/DL (ref 65–140)
GLUCOSE SERPL-MCNC: 359 MG/DL (ref 65–140)
GLUCOSE SERPL-MCNC: 96 MG/DL (ref 65–140)
GLUCOSE UR STRIP-MCNC: ABNORMAL MG/DL
HCO3 BLDV-SCNC: 17.9 MMOL/L (ref 24–30)
HCT VFR BLD AUTO: 41.3 % (ref 34.8–46.1)
HGB BLD-MCNC: 13.3 G/DL (ref 11.5–15.4)
HGB UR QL STRIP.AUTO: ABNORMAL
HYALINE CASTS #/AREA URNS LPF: ABNORMAL /LPF
IMM GRANULOCYTES # BLD AUTO: 0.05 THOUSAND/UL (ref 0–0.2)
IMM GRANULOCYTES NFR BLD AUTO: 0 % (ref 0–2)
KETONES UR STRIP-MCNC: ABNORMAL MG/DL
LEUKOCYTE ESTERASE UR QL STRIP: NEGATIVE
LIPASE SERPL-CCNC: 15 U/L (ref 11–82)
LYMPHOCYTES # BLD AUTO: 1.84 THOUSANDS/ÂΜL (ref 0.6–4.47)
LYMPHOCYTES NFR BLD AUTO: 14 % (ref 14–44)
MCH RBC QN AUTO: 26.8 PG (ref 26.8–34.3)
MCHC RBC AUTO-ENTMCNC: 32.2 G/DL (ref 31.4–37.4)
MCV RBC AUTO: 83 FL (ref 82–98)
MONOCYTES # BLD AUTO: 0.65 THOUSAND/ÂΜL (ref 0.17–1.22)
MONOCYTES NFR BLD AUTO: 5 % (ref 4–12)
NEUTROPHILS # BLD AUTO: 10.13 THOUSANDS/ÂΜL (ref 1.85–7.62)
NEUTS SEG NFR BLD AUTO: 80 % (ref 43–75)
NITRITE UR QL STRIP: NEGATIVE
NON-SQ EPI CELLS URNS QL MICRO: ABNORMAL /HPF
NRBC BLD AUTO-RTO: 0 /100 WBCS
O2 CT BLDV-SCNC: 17.4 ML/DL
P AXIS: 71 DEGREES
PCO2 BLDV: 23.6 MM HG (ref 42–50)
PH BLDV: 7.5 [PH] (ref 7.3–7.4)
PH UR STRIP.AUTO: 5.5 [PH]
PLATELET # BLD AUTO: 302 THOUSANDS/UL (ref 149–390)
PMV BLD AUTO: 10.4 FL (ref 8.9–12.7)
PO2 BLDV: 50.3 MM HG (ref 35–45)
POTASSIUM SERPL-SCNC: 4.5 MMOL/L (ref 3.5–5.3)
PR INTERVAL: 162 MS
PROT SERPL-MCNC: 8.9 G/DL (ref 6.4–8.4)
PROT UR STRIP-MCNC: ABNORMAL MG/DL
QRS AXIS: -42 DEGREES
QRSD INTERVAL: 90 MS
QT INTERVAL: 360 MS
QTC INTERVAL: 454 MS
RBC # BLD AUTO: 4.97 MILLION/UL (ref 3.81–5.12)
RBC #/AREA URNS AUTO: ABNORMAL /HPF
SODIUM SERPL-SCNC: 136 MMOL/L (ref 135–147)
SP GR UR STRIP.AUTO: 1.02 (ref 1–1.03)
T WAVE AXIS: 33 DEGREES
UROBILINOGEN UR STRIP-ACNC: <2 MG/DL
VENTRICULAR RATE: 96 BPM
WBC # BLD AUTO: 12.74 THOUSAND/UL (ref 4.31–10.16)
WBC #/AREA URNS AUTO: ABNORMAL /HPF

## 2023-08-31 PROCEDURE — 96365 THER/PROPH/DIAG IV INF INIT: CPT

## 2023-08-31 PROCEDURE — 99284 EMERGENCY DEPT VISIT MOD MDM: CPT

## 2023-08-31 PROCEDURE — 76775 US EXAM ABDO BACK WALL LIM: CPT

## 2023-08-31 PROCEDURE — 99285 EMERGENCY DEPT VISIT HI MDM: CPT | Performed by: EMERGENCY MEDICINE

## 2023-08-31 PROCEDURE — 93010 ELECTROCARDIOGRAM REPORT: CPT | Performed by: INTERNAL MEDICINE

## 2023-08-31 PROCEDURE — 85025 COMPLETE CBC W/AUTO DIFF WBC: CPT

## 2023-08-31 PROCEDURE — 99223 1ST HOSP IP/OBS HIGH 75: CPT | Performed by: INTERNAL MEDICINE

## 2023-08-31 PROCEDURE — 93005 ELECTROCARDIOGRAM TRACING: CPT

## 2023-08-31 PROCEDURE — 81001 URINALYSIS AUTO W/SCOPE: CPT

## 2023-08-31 PROCEDURE — 82948 REAGENT STRIP/BLOOD GLUCOSE: CPT

## 2023-08-31 PROCEDURE — 83690 ASSAY OF LIPASE: CPT

## 2023-08-31 PROCEDURE — 82805 BLOOD GASES W/O2 SATURATION: CPT

## 2023-08-31 PROCEDURE — 80053 COMPREHEN METABOLIC PANEL: CPT

## 2023-08-31 PROCEDURE — 93970 EXTREMITY STUDY: CPT | Performed by: INTERNAL MEDICINE

## 2023-08-31 PROCEDURE — 93970 EXTREMITY STUDY: CPT

## 2023-08-31 PROCEDURE — 36415 COLL VENOUS BLD VENIPUNCTURE: CPT

## 2023-08-31 PROCEDURE — 96375 TX/PRO/DX INJ NEW DRUG ADDON: CPT

## 2023-08-31 PROCEDURE — 85379 FIBRIN DEGRADATION QUANT: CPT

## 2023-08-31 PROCEDURE — 71045 X-RAY EXAM CHEST 1 VIEW: CPT

## 2023-08-31 PROCEDURE — 84484 ASSAY OF TROPONIN QUANT: CPT

## 2023-08-31 RX ORDER — INSULIN GLARGINE 100 [IU]/ML
20 INJECTION, SOLUTION SUBCUTANEOUS
Status: DISCONTINUED | OUTPATIENT
Start: 2023-08-31 | End: 2023-09-02 | Stop reason: HOSPADM

## 2023-08-31 RX ORDER — OXYCODONE HYDROCHLORIDE 5 MG/1
5 TABLET ORAL EVERY 6 HOURS PRN
Status: DISCONTINUED | OUTPATIENT
Start: 2023-08-31 | End: 2023-09-02 | Stop reason: HOSPADM

## 2023-08-31 RX ORDER — INSULIN LISPRO 100 [IU]/ML
1-6 INJECTION, SOLUTION INTRAVENOUS; SUBCUTANEOUS
Status: DISCONTINUED | OUTPATIENT
Start: 2023-08-31 | End: 2023-09-02 | Stop reason: HOSPADM

## 2023-08-31 RX ORDER — HYDROMORPHONE HCL/PF 1 MG/ML
0.5 SYRINGE (ML) INJECTION ONCE
Status: COMPLETED | OUTPATIENT
Start: 2023-08-31 | End: 2023-08-31

## 2023-08-31 RX ORDER — INSULIN LISPRO 100 [IU]/ML
1-5 INJECTION, SOLUTION INTRAVENOUS; SUBCUTANEOUS
Status: DISCONTINUED | OUTPATIENT
Start: 2023-08-31 | End: 2023-09-02 | Stop reason: HOSPADM

## 2023-08-31 RX ORDER — SODIUM CHLORIDE, SODIUM GLUCONATE, SODIUM ACETATE, POTASSIUM CHLORIDE, MAGNESIUM CHLORIDE, SODIUM PHOSPHATE, DIBASIC, AND POTASSIUM PHOSPHATE .53; .5; .37; .037; .03; .012; .00082 G/100ML; G/100ML; G/100ML; G/100ML; G/100ML; G/100ML; G/100ML
100 INJECTION, SOLUTION INTRAVENOUS CONTINUOUS
Status: DISCONTINUED | OUTPATIENT
Start: 2023-08-31 | End: 2023-09-02 | Stop reason: HOSPADM

## 2023-08-31 RX ORDER — ATORVASTATIN CALCIUM 40 MG/1
40 TABLET, FILM COATED ORAL
Status: DISCONTINUED | OUTPATIENT
Start: 2023-08-31 | End: 2023-09-02 | Stop reason: HOSPADM

## 2023-08-31 RX ORDER — DULOXETIN HYDROCHLORIDE 60 MG/1
60 CAPSULE, DELAYED RELEASE ORAL DAILY
Status: DISCONTINUED | OUTPATIENT
Start: 2023-08-31 | End: 2023-09-02 | Stop reason: HOSPADM

## 2023-08-31 RX ORDER — LABETALOL HYDROCHLORIDE 5 MG/ML
10 INJECTION, SOLUTION INTRAVENOUS ONCE
Status: COMPLETED | OUTPATIENT
Start: 2023-08-31 | End: 2023-08-31

## 2023-08-31 RX ORDER — INSULIN LISPRO 100 [IU]/ML
8 INJECTION, SOLUTION INTRAVENOUS; SUBCUTANEOUS
Status: DISCONTINUED | OUTPATIENT
Start: 2023-08-31 | End: 2023-09-02 | Stop reason: HOSPADM

## 2023-08-31 RX ORDER — ISOSORBIDE MONONITRATE 60 MG/1
60 TABLET, EXTENDED RELEASE ORAL DAILY
Status: DISCONTINUED | OUTPATIENT
Start: 2023-08-31 | End: 2023-09-02 | Stop reason: HOSPADM

## 2023-08-31 RX ORDER — SODIUM CHLORIDE, SODIUM GLUCONATE, SODIUM ACETATE, POTASSIUM CHLORIDE, MAGNESIUM CHLORIDE, SODIUM PHOSPHATE, DIBASIC, AND POTASSIUM PHOSPHATE .53; .5; .37; .037; .03; .012; .00082 G/100ML; G/100ML; G/100ML; G/100ML; G/100ML; G/100ML; G/100ML
500 INJECTION, SOLUTION INTRAVENOUS ONCE
Status: DISCONTINUED | OUTPATIENT
Start: 2023-08-31 | End: 2023-09-02 | Stop reason: HOSPADM

## 2023-08-31 RX ORDER — LABETALOL HYDROCHLORIDE 5 MG/ML
10 INJECTION, SOLUTION INTRAVENOUS EVERY 6 HOURS PRN
Status: DISCONTINUED | OUTPATIENT
Start: 2023-08-31 | End: 2023-09-02 | Stop reason: HOSPADM

## 2023-08-31 RX ORDER — ENOXAPARIN SODIUM 100 MG/ML
30 INJECTION SUBCUTANEOUS DAILY
Status: DISCONTINUED | OUTPATIENT
Start: 2023-08-31 | End: 2023-09-02 | Stop reason: HOSPADM

## 2023-08-31 RX ORDER — ACETAMINOPHEN 325 MG/1
650 TABLET ORAL EVERY 6 HOURS PRN
Status: DISCONTINUED | OUTPATIENT
Start: 2023-08-31 | End: 2023-09-02 | Stop reason: HOSPADM

## 2023-08-31 RX ORDER — ENOXAPARIN SODIUM 100 MG/ML
40 INJECTION SUBCUTANEOUS DAILY
Status: DISCONTINUED | OUTPATIENT
Start: 2023-08-31 | End: 2023-08-31 | Stop reason: DRUGHIGH

## 2023-08-31 RX ORDER — LOSARTAN POTASSIUM 50 MG/1
100 TABLET ORAL DAILY
Status: DISCONTINUED | OUTPATIENT
Start: 2023-08-31 | End: 2023-08-31

## 2023-08-31 RX ORDER — LEVOTHYROXINE SODIUM 0.05 MG/1
50 TABLET ORAL
Status: DISCONTINUED | OUTPATIENT
Start: 2023-08-31 | End: 2023-09-02 | Stop reason: HOSPADM

## 2023-08-31 RX ORDER — ONDANSETRON 2 MG/ML
4 INJECTION INTRAMUSCULAR; INTRAVENOUS EVERY 6 HOURS PRN
Status: DISCONTINUED | OUTPATIENT
Start: 2023-08-31 | End: 2023-09-02 | Stop reason: HOSPADM

## 2023-08-31 RX ORDER — HYDROMORPHONE HCL/PF 1 MG/ML
0.5 SYRINGE (ML) INJECTION EVERY 4 HOURS PRN
Status: DISCONTINUED | OUTPATIENT
Start: 2023-08-31 | End: 2023-09-02 | Stop reason: HOSPADM

## 2023-08-31 RX ORDER — ALPRAZOLAM 0.5 MG/1
0.5 TABLET ORAL 2 TIMES DAILY PRN
Status: DISCONTINUED | OUTPATIENT
Start: 2023-08-31 | End: 2023-09-02 | Stop reason: HOSPADM

## 2023-08-31 RX ORDER — TAMSULOSIN HYDROCHLORIDE 0.4 MG/1
0.4 CAPSULE ORAL
Status: DISCONTINUED | OUTPATIENT
Start: 2023-08-31 | End: 2023-09-02 | Stop reason: HOSPADM

## 2023-08-31 RX ADMIN — LEVOTHYROXINE SODIUM 50 MCG: 50 TABLET ORAL at 06:39

## 2023-08-31 RX ADMIN — ISOSORBIDE MONONITRATE 60 MG: 60 TABLET, EXTENDED RELEASE ORAL at 08:07

## 2023-08-31 RX ADMIN — INSULIN LISPRO 1 UNITS: 100 INJECTION, SOLUTION INTRAVENOUS; SUBCUTANEOUS at 17:41

## 2023-08-31 RX ADMIN — INSULIN GLARGINE 20 UNITS: 100 INJECTION, SOLUTION SUBCUTANEOUS at 22:29

## 2023-08-31 RX ADMIN — SODIUM CHLORIDE, SODIUM GLUCONATE, SODIUM ACETATE, POTASSIUM CHLORIDE, MAGNESIUM CHLORIDE, SODIUM PHOSPHATE, DIBASIC, AND POTASSIUM PHOSPHATE 100 ML/HR: .53; .5; .37; .037; .03; .012; .00082 INJECTION, SOLUTION INTRAVENOUS at 08:12

## 2023-08-31 RX ADMIN — INSULIN LISPRO 4 UNITS: 100 INJECTION, SOLUTION INTRAVENOUS; SUBCUTANEOUS at 10:21

## 2023-08-31 RX ADMIN — INSULIN LISPRO 8 UNITS: 100 INJECTION, SOLUTION INTRAVENOUS; SUBCUTANEOUS at 17:41

## 2023-08-31 RX ADMIN — DULOXETINE HYDROCHLORIDE 60 MG: 60 CAPSULE, DELAYED RELEASE ORAL at 08:07

## 2023-08-31 RX ADMIN — ATORVASTATIN CALCIUM 40 MG: 40 TABLET, FILM COATED ORAL at 17:39

## 2023-08-31 RX ADMIN — LABETALOL HYDROCHLORIDE 10 MG: 5 INJECTION, SOLUTION INTRAVENOUS at 09:56

## 2023-08-31 RX ADMIN — HYDROMORPHONE HYDROCHLORIDE 0.5 MG: 1 INJECTION, SOLUTION INTRAMUSCULAR; INTRAVENOUS; SUBCUTANEOUS at 01:47

## 2023-08-31 RX ADMIN — INSULIN LISPRO 8 UNITS: 100 INJECTION, SOLUTION INTRAVENOUS; SUBCUTANEOUS at 10:22

## 2023-08-31 RX ADMIN — TAMSULOSIN HYDROCHLORIDE 0.4 MG: 0.4 CAPSULE ORAL at 06:39

## 2023-08-31 RX ADMIN — LOSARTAN POTASSIUM 100 MG: 50 TABLET, FILM COATED ORAL at 08:07

## 2023-08-31 RX ADMIN — SODIUM CHLORIDE, SODIUM LACTATE, POTASSIUM CHLORIDE, AND CALCIUM CHLORIDE 500 ML: .6; .31; .03; .02 INJECTION, SOLUTION INTRAVENOUS at 01:47

## 2023-08-31 RX ADMIN — ASPIRIN 81 MG: 81 TABLET, COATED ORAL at 08:07

## 2023-08-31 RX ADMIN — Medication 10 MG: at 04:25

## 2023-08-31 RX ADMIN — INSULIN LISPRO 6 UNITS: 100 INJECTION, SOLUTION INTRAVENOUS; SUBCUTANEOUS at 12:22

## 2023-08-31 RX ADMIN — ENOXAPARIN SODIUM 30 MG: 30 INJECTION SUBCUTANEOUS at 08:07

## 2023-08-31 RX ADMIN — Medication 2.5 MG: at 06:39

## 2023-08-31 RX ADMIN — DILTIAZEM HYDROCHLORIDE 300 MG: 180 CAPSULE, COATED, EXTENDED RELEASE ORAL at 08:07

## 2023-08-31 RX ADMIN — INSULIN LISPRO 8 UNITS: 100 INJECTION, SOLUTION INTRAVENOUS; SUBCUTANEOUS at 12:23

## 2023-08-31 RX ADMIN — SODIUM CHLORIDE, SODIUM GLUCONATE, SODIUM ACETATE, POTASSIUM CHLORIDE, MAGNESIUM CHLORIDE, SODIUM PHOSPHATE, DIBASIC, AND POTASSIUM PHOSPHATE 100 ML/HR: .53; .5; .37; .037; .03; .012; .00082 INJECTION, SOLUTION INTRAVENOUS at 19:55

## 2023-08-31 NOTE — PLAN OF CARE
Problem: MOBILITY - ADULT  Goal: Maintain or return to baseline ADL function  Description: INTERVENTIONS:  -  Assess patient's ability to carry out ADLs; assess patient's baseline for ADL function and identify physical deficits which impact ability to perform ADLs (bathing, care of mouth/teeth, toileting, grooming, dressing, etc.)  - Assess/evaluate cause of self-care deficits   - Assess range of motion  - Assess patient's mobility; develop plan if impaired  - Assess patient's need for assistive devices and provide as appropriate  - Encourage maximum independence but intervene and supervise when necessary  - Involve family in performance of ADLs  - Assess for home care needs following discharge   - Consider OT consult to assist with ADL evaluation and planning for discharge  - Provide patient education as appropriate  Outcome: Progressing  Goal: Maintains/Returns to pre admission functional level  Description: INTERVENTIONS:  - Perform BMAT or MOVE assessment daily.   - Set and communicate daily mobility goal to care team and patient/family/caregiver. - Collaborate with rehabilitation services on mobility goals if consulted  - Perform Range of Motion  times a day. - Reposition patient every  hours.   - Dangle patient times a day  - Stand patient  times a day  - Ambulate patient  times a day  - Out of bed to chair  times a day   - Out of bed for meal times a day  - Out of bed for toileting  - Record patient progress and toleration of activity level   Outcome: Progressing

## 2023-08-31 NOTE — ASSESSMENT & PLAN NOTE
· POA BP noted 180-207/80s however no associated symptoms such as headache, chest pain  · Has not been taking antihypertensive medications over the past 2 to 3 days  · At the ED received 1 dose of labetalol 10 mg.  · Per chart review patient most recently seen by PCP on July 28, 2023 with acceptable blood pressures in the 130s  · Blood Pressure: (!) 172/101       · Plan  · Continue home Imdur 60 mg once a day  · Continue Cardizem  mg once a day as per pharmacy formulary for Tiazac 300 mg  · Continue home losartan 100 mg per pharmacy formulary for irbesartan.

## 2023-08-31 NOTE — ASSESSMENT & PLAN NOTE
· POA 5 to 6 days history of sharp generalized abdominal pain that waxes and wanes worsening last night 7/10 without associated nausea or vomiting however poor p.o. intake. Patient also endorsed suprapubic abdominal pain with associated urgency however no dysuria or frequency. Patient was just seen at the ED on 08/26/2023 for the same was diagnosed with right hydroureter related to 3 mm calculus for which was subsequently discharged on Flomax however patient never started medications. · At the ED on 08/26/2023 CT abdominal pelvis did showed "mild right hydronephrosis and right hydroureter related to a 3 mm calculus in the right ureterovesical junction" and subsequently discharged from the ED with Flomax however patient never started. · Generalized abdominal pain likely due to right UPJ obstruction versus anterior abdominal hernia given tenderness upon palpation around the site however this is not incarcerated at this time. · Plan  · Start Flomax 0.4 mg once a day   · Check UA  · Start IV Isolyte 75 cc/h  · I's and O's  · If no improvement consider repeat renal ultrasound rule out worsening hydronephrosis as well as a urology consult given history of CKD stage IV and mildly elevated creatinine.

## 2023-08-31 NOTE — H&P
[facility]  H&P  Name: Min Nobles 69 y.o. female I MRN: [id number]  Unit/Bed#: W -01 I Date of Admission: 8/31/2023   Date of Service: 8/31/2023 I Hospital Day: 0      Assessment/Plan   * Generalized abdominal pain  Assessment & Plan  · POA 5 to 6 days history of sharp generalized abdominal pain that waxes and wanes worsening last night 7/10 without associated nausea or vomiting however poor p.o. intake. Patient also endorsed suprapubic abdominal pain with associated urgency however no dysuria or frequency. Patient was just seen at the ED on 08/26/2023 for the same was diagnosed with right hydroureter related to 3 mm calculus for which was subsequently discharged on Flomax however patient never started medications. · At the ED on 08/26/2023 CT abdominal pelvis did showed "mild right hydronephrosis and right hydroureter related to a 3 mm calculus in the right ureterovesical junction" and subsequently discharged from the ED with Flomax however patient never started. · Generalized abdominal pain likely due to right UPJ obstruction versus anterior abdominal hernia given tenderness upon palpation around the site however this is not incarcerated at this time. · Plan  · Start Flomax 0.4 mg once a day   · Check UA  · Start IV Isolyte 75 cc/h  · I's and O's  · If no improvement consider repeat renal ultrasound rule out worsening hydronephrosis as well as a urology consult given history of CKD stage IV and mildly elevated creatinine.     Hypertensive Urgency  Assessment & Plan  · POA BP noted 180-207/80s however no associated symptoms such as headache, chest pain  · Has not been taking antihypertensive medications over the past 2 to 3 days  · At the ED received 1 dose of labetalol 10 mg.  · Per chart review patient most recently seen by PCP on July 28, 2023 with acceptable blood pressures in the 130s  · Blood Pressure: (!) 172/101       · Plan  · Continue home Imdur 60 mg once a day  · Continue Cardizem  mg once a day as per pharmacy formulary for Tiazac 300 mg  · Continue home losartan 100 mg per pharmacy formulary for irbesartan. Hypercalcemia  Assessment & Plan  · POA calcium mildly elevated 10.6 with associated urolithiasis  · My exam patient seems hypovolemic at this time. · Plan  · Isolated bolus of 500 cc  · Start IV Isolyte 75 cc/h  · Encourage p.o. intake  · If no improvement consider further work-up for hypercalcemia    Stage 4 chronic kidney disease [facility])  Assessment & Plan  Lab Results   Component Value Date    EGFR 25 08/31/2023    EGFR 28 08/26/2023    EGFR 31 06/28/2023    CREATININE 1.86 (H) 08/31/2023    CREATININE 1.73 (H) 08/26/2023    CREATININE 1.58 (H) 06/28/2023   · Pressure review patient's baseline seems to be 1.4-1.6  · Likely multifactorial prerenal and postrenal in the setting of poor p.o. 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  · Urinary retention protocol  · I's and O's      Type 2 diabetes mellitus with complication, with long-term current use of insulin ([facility])  Assessment & Plan  Lab Results   Component Value Date    HGBA1C 10.4 (H) 06/13/2023       No results for input(s): "POCGLU" in the last 72 hours. Blood Sugar Average: Last 72 hrs:  · History of type 2 diabetes uncontrolled on insulin pump  · Has been recently prescribed Ozempic however has not started it just yet    · Plan  · Patient does not remember insulin carbohydrate ratio at this time and does not have insulin pump currently placed  · Continue carbohydrate controlled diet level 2  · We will start Lantus 20 units at bedtime as patient has had poor p.o. intake over the past 3 days  · Continue with Humalog 8 units 3 times daily  · Consider endocrinology consult upon discharge for insulin pump settings assistance.     Anxiety  Assessment & Plan  · Continue home Cymbalta 60 mg once a day  · Continue home Xanax 0.5 mg twice daily as needed    Elevated d-dimer  Assessment & Plan  · Remote history of provoked DVT/PE   · POA noted with mildly elevated D-dimer 1.10  · Upon my exam somewhat lower extremity swelling L>R although there is significant lipedema bilaterally however no tenderness to palpation or discomfort. · VAS duplex US lower extremity    Leucocytosis  Assessment & Plan  · POA WBC mildly elevated 12.74 however patient has remained afebrile, no fevers no chills  · Likely in the setting of urinary calculus however no pyelonephritis  · Plan  · Check UA has not been collected just yet  · Monitor for fever curve will monitor off Abx however pending UA consider starting CTX  · CBC a.m. VTE Pharmacologic Prophylaxis: VTE Score: 7 High Risk (Score >/= 5) - Pharmacological DVT Prophylaxis Ordered: enoxaparin (Lovenox). Sequential Compression Devices Ordered. Code Status: Level 1 - Full Code patient  Discussion with family: Patient declined call to . Anticipated Length of Stay: Patient will be admitted on an observation basis with an anticipated length of stay of less than 2 midnights secondary to Generalized abdominal pain. Chief Complaint: Generalized abdominal pain    History of Present Illness:  Brit Holcomb is a 69 y.o. female with a PMH of CKD stage IV, hypertension, uncontrolled type 2 diabetes, fibromyalgia, chronic pain syndrome, anxiety on insulin who presents with 5 to 6-day history of generalized abdominal pain that started approximately on Saturday for which she originally presented to the emergency department with subsequent work-up revealing UPJ obstruction, mild right sided hydronephrosis secondary to 3 mm calculi for which subsequently was discharged from the ED with Flomax.   She endorsed that since then her abdominal pain has been present described as sharp intermittent that waxes and wanes at worst 8/10 with somewhat associated dry heaves, poor oral intake however no vomiting. Patient also endorsed somewhat looser stools however does not remember if has been taking her home bowel regimen medications. Patient also endorsed urinary urgency and frequency however no dysuria. Patient endorsed that due to her abdominal pain and intermittent nausea has had poor p.o. intake, fatigue and generalized weakness. Patient denies any fevers, chills, chest pain, palpitations,      Review of Systems:  Review of Systems   Constitutional: Negative for chills and fever. HENT: Negative for ear pain and sore throat. Eyes: Negative for pain and visual disturbance. Respiratory: Negative for cough and shortness of breath. Cardiovascular: Negative for chest pain and palpitations. Gastrointestinal: Positive for abdominal pain and nausea. Negative for abdominal distention, blood in stool, constipation, diarrhea and vomiting. Genitourinary: Positive for frequency and urgency. Negative for difficulty urinating, dysuria, flank pain and hematuria. Musculoskeletal: Negative for arthralgias and back pain. Skin: Negative for color change and rash. Neurological: Negative for seizures and syncope. All other systems reviewed and are negative.       Past Medical and Surgical History:   Past Medical History:   Diagnosis Date   • Acute embolism and thrombosis of unspecified deep veins of unspecified lower extremity ([address])     Last Assessed:  5/18/17   • Anemia    • Anosmia    • Anxiety    • Arthritis    • Asthma    • Back pain    • Bilateral macular retinal edema    • CAD (coronary artery disease)    • Cataract    • Cervical disc herniation    • Cervical radiculopathy    • Cervical spinal stenosis    • Cervical spondylolysis    • Chronic kidney disease    • Chronic mastoiditis    • Colon polyp    • Complex endometrial hyperplasia    • Depression    • Diabetes mellitus ([address])    • Disease of thyroid gland    • DVT (deep venous thrombosis) ([address])    • DVT (deep venous thrombosis) ([address]) 6/16/2017 • Fibromyalgia    • Fibromyalgia, primary    • Hyperlipidemia    • Hypertension    • Hypothyroid    • Kidney stone    • Lumbar radiculopathy    • Neck pain    • Obese    • PONV (postoperative nausea and vomiting)    • Shingles 07/01/2021   • Spinal stenosis    • Stomach ulcer    • Thyroid disease        Past Surgical History:   Procedure Laterality Date   • BACK SURGERY     • CARPAL TUNNEL RELEASE     • CATARACT EXTRACTION     • CHOLECYSTECTOMY     • COLONOSCOPY     • CORONARY ANGIOPLASTY     • CORONARY ARTERY BYPASS GRAFT     • CYSTOSCOPY N/A 6/20/2017    Procedure: CYSTOSCOPY;  Surgeon: Oscar Acevedo MD;  Location: [facility];  Service: Gynecology Oncology   • CYSTOSCOPY     • OH LAPS TOTAL HYSTERECT 250 GM/< W/RMVL TUBE/OVARY N/A 6/20/2017    Procedure: ROBOTIC HYSTERECTOMY; BILATERAL SALPINO-OOPHERECTOMY; umbilical hernia repair.;  Surgeon: Oscar Acevedo MD;  Location: [facility];  Service: Gynecology Oncology   • OH REPAIR FIRST ABDOMINAL WALL HERNIA N/A 5/12/2022    Procedure: REPAIR HERNIA INCISIONAL;  Surgeon: Austin Plasencia DO;  Location[facility];  Service: General   • TONSILECTOMY AND ADNOIDECTOMY     • TONSILLECTOMY     • UMBILICAL HERNIA REPAIR         Meds/Allergies:  Prior to Admission medications    Medication Sig Start Date End Date Taking?  Authorizing Provider   ALPRAZolam (XANAX) 0.5 mg tablet TAKE 1 TABLET (0.5 MG TOTAL) BY MOUTH 2 (TWO) TIMES A DAY AS NEEDED FOR ANXIETY. 8/21/23  Yes Lissette Hough DO   aspirin 81 MG tablet Take 81 mg by mouth daily   Yes Historical Provider, MD   cholecalciferol (VITAMIN D3) 1,000 units tablet Take 2,000 Units by mouth daily   Yes Historical Provider, MD   Coenzyme Q10 (Co Q-10) 200 MG CAPS Take by mouth in the morning   Yes Historical Provider, MD   diltiazem (TIAZAC) 300 MG 24 hr capsule Take 300 mg by mouth daily   Yes Historical Provider, MD   Docusate Sodium (DSS) 100 MG CAPS Take 1 capsule by mouth every 12 (twelve) hours   Yes Historical Provider, MD   DULoxetine (CYMBALTA) 60 mg delayed release capsule TAKE 1 CAPSULE BY MOUTH EVERY DAY 8/21/23  Yes Tomas Walker,    insulin aspart (NovoLOG) 100 units/mL injection Inject 12 Units under the skin 3 (three) times a day before meals 6/22/17  Yes Maia Osler, MD   irbesartan (AVAPRO) 300 mg tablet Take 300 mg by mouth 10/10/22 10/10/23 Yes Historical Provider, MD   levothyroxine 50 mcg tablet Take 50 mcg by mouth daily   Yes Historical Provider, MD   NovoLOG 100 UNIT/ML SOLN INFUSE UNDER THE SKIN VIA INSULIN PUMP AS DIRECTED.  TOTAL DAILY DOSE  UNITS 4/21/22  Yes Historical Provider, MD   rosuvastatin (CRESTOR) 20 MG tablet Take 20 mg by mouth every evening   12/4/18  Yes Historical Provider, MD BOLTON UF III MINI PEN NEEDLES 31G X 5 MM MISC  6/5/20   Historical Provider, MD   BAQSIMI TWO PACK 3 MG/DOSE POWD Use as directed  10/8/19   Historical Provider, MD   BD Insulin Syringe U/F 31G X 5/16" 0.5 ML MISC 4 (four) times a day As directed 8/22/22   Historical Provider, MD   Continuous Blood Gluc Sensor (Dexcom G6 Sensor) MISC Use 1 each 7/28/23   Historical Provider, MD   diclofenac sodium (VOLTAREN) 1 % Apply 2 g topically 4 (four) times a day 1/15/19   ALAN Rasmussen   fluocinonide (LIDEX) 0.05 % external solution     Historical Provider, MD   fluticasone (FLONASE) 50 mcg/act nasal spray 1 spray into each nostril daily  Patient not taking: Reported on 8/31/2023 2/23/23   Moriah Matias MD   furosemide (LASIX) 20 mg tablet     Historical Provider, MD   guaiFENesin (MUCINEX) 600 mg 12 hr tablet Take 2 tablets (1,200 mg total) by mouth every 12 (twelve) hours  Patient not taking: Reported on 6/29/2023 2/23/23   Moriah Matias MD   Insulin Disposable Pump (OmniPod Dash 5 Pack Pods) MISC USE 1 POD EVERY 1.5 DAYS 6/6/21   Historical Provider, MD   isosorbide mononitrate (IMDUR) 60 mg 24 hr tablet TAKE 1.5 TABs AT NIGHT 8/23/21   Historical Provider, MD   naloxone (NARCAN) 4 mg/0.1 mL nasal spray Administer 1 spray into a nostril. If no response after 2-3 minutes, give another dose in the other nostril using a new spray. 21   Dana Valerio MD   nitroglycerin (NITROLINGUAL) 0.4 mg/spray spray USE ONE SPRAY Q 5 MINUTES AS NEEDED FOR CHEST PAIN. IF NO RELIEF, CALL 911. Patient not taking: Reported on 2023   Historical Provider, MD   Ozempic, 0.25 or 0.5 MG/DOSE, 2 MG/3ML injection pen INJECT 0.25 MG UNDER THE SKIN ONCE A WEEK. Patient not taking: Reported on 2023 6/15/23   Historical Provider, MD   tamsulosin (FLOMAX) 0.4 mg Take 1 capsule (0.4 mg total) by mouth daily with dinner Do not start before 2023. Patient not taking: Reported on 2023   Marilyn Amato PA-C     I have reviewed home medications with patient personally. Allergies: Allergies   Allergen Reactions   • Molds & Smuts Allergic Rhinitis   • Other Allergic Rhinitis     RAGWEED, CAT DANDER, DOG DANDER    • Pollen Extract Other (See Comments)     Cold symptoms         Social History:  Marital Status: /Civil Union   Occupation:   Patient Pre-hospital Living Situation: With spouse, With other family member: Daughter  Patient Pre-hospital Level of Mobility: walks with cane  Patient Pre-hospital Diet Restrictions: None  Substance Use History:   Social History     Substance and Sexual Activity   Alcohol Use No     Social History     Tobacco Use   Smoking Status Former   • Packs/day: 1.00   • Years: 6.00   • Total pack years: 6.00   • Types: Cigarettes   • Quit date:    • Years since quittin.6   Smokeless Tobacco Never   Tobacco Comments    Smoked about a half a pack for about 4 yrs. Quite about 50 yrs ago.      Social History     Substance and Sexual Activity   Drug Use No       Family History:  Family History   Problem Relation Age of Onset   • Arthritis Mother    • Leukemia Mother    • Other Mother         Anxiety, major depressive disorder, recurrent episode with atypical features   • Coronary artery disease Father         Heart problem   • Diabetes Father    • Other Father         Infectious disease   • Alzheimer's disease Maternal Grandmother    • Other Maternal Grandfather         Heart problem   • Other Daughter         Anxiety, major depressive disorder, recurrent episode with atypical features   • Alcohol abuse Other         Grandparent   • Cancer Family    • Diabetes Family    • Hypertension Family    • Other Family         Reported prior back trouble, thyroid disorder       Physical Exam:     Vitals:   Blood Pressure: (!) 172/101 (08/31/23 0526)  Pulse: 85 (08/31/23 0526)  Temperature: 98.3 °F (36.8 °C) (08/31/23 0526)  Temp Source: Oral (08/31/23 0034)  Respirations: 18 (08/31/23 0400)  Weight - Scale: 89 kg (196 lb 3.4 oz) (08/31/23 0600)  SpO2: 94 % (08/31/23 0526)    Physical Exam  Vitals and nursing note reviewed. Constitutional:       General: She is not in acute distress. Appearance: She is well-developed. She is not ill-appearing. Comments: Alert although somewhat confused and anxious. Not ill-appearing   HENT:      Head: Normocephalic and atraumatic. Right Ear: External ear normal.      Left Ear: External ear normal.      Nose: Nose normal.      Mouth/Throat:      Mouth: Mucous membranes are dry. Eyes:      General: No scleral icterus. Extraocular Movements: Extraocular movements intact. Conjunctiva/sclera: Conjunctivae normal.      Pupils: Pupils are equal, round, and reactive to light. Cardiovascular:      Rate and Rhythm: Normal rate and regular rhythm. Pulses: Normal pulses. Heart sounds: Normal heart sounds. No murmur heard. Pulmonary:      Effort: Pulmonary effort is normal. No respiratory distress. Breath sounds: Normal breath sounds. Abdominal:      General: Bowel sounds are normal. There is distension. Palpations: Abdomen is soft. Tenderness:  There is abdominal tenderness. There is no right CVA tenderness or left CVA tenderness. Hernia: A hernia is present. Comments: Obese abdomen, no significant distended however anterior abdominal hernia present depressible. Bowel sounds present. Tender to palpation epigastric area as well as right upper quadrant and around hernia site. Tenderness to palpation suprapubic area no tender to palpation CVA bilaterally   Musculoskeletal:         General: No swelling. Cervical back: Normal range of motion and neck supple. Comments: Bilateral lower extremity lipedema somewhat L>R. Skin:     General: Skin is warm and dry. Capillary Refill: Capillary refill takes less than 2 seconds. Neurological:      General: No focal deficit present. Mental Status: She is alert. Psychiatric:         Attention and Perception: Attention normal.         Mood and Affect: Mood is anxious.          Speech: Speech normal.          Additional Data:     Lab Results:  Results from last 7 days   Lab Units 08/31/23  0106   WBC Thousand/uL 12.74*   HEMOGLOBIN g/dL 13.3   HEMATOCRIT % 41.3   PLATELETS Thousands/uL 302   NEUTROS PCT % 80*   LYMPHS PCT % 14   MONOS PCT % 5   EOS PCT % 0     Results from last 7 days   Lab Units 08/31/23  0106   SODIUM mmol/L 136   POTASSIUM mmol/L 4.5   CHLORIDE mmol/L 103   CO2 mmol/L 19*   BUN mg/dL 24   CREATININE mg/dL 1.86*   ANION GAP mmol/L 14   CALCIUM mg/dL 10.4*   ALBUMIN g/dL 4.5   TOTAL BILIRUBIN mg/dL 0.62   ALK PHOS U/L 115*   ALT U/L 16   AST U/L 27   GLUCOSE RANDOM mg/dL 96                       Lines/Drains:  Invasive Devices     Peripheral Intravenous Line  Duration           Peripheral IV 08/31/23 Right;Ventral (anterior) Forearm <1 day                    Imaging: Personally reviewed the following imaging: abdominal/pelvic CT and xray(s)  XR chest 1 view portable   ED Interpretation by Omkar Farrar MD (08/31 0320)   No acute abnormalities      VAS lower limb venous duplex study, complete bilateral    (Results Pending)       EKG and Other Studies Reviewed on Admission:   · EKG: No EKG obtained. ** Please Note: This note has been constructed using a voice recognition system.  **

## 2023-08-31 NOTE — ASSESSMENT & PLAN NOTE
Lab Results   Component Value Date    HGBA1C 10.4 (H) 06/13/2023       No results for input(s): "POCGLU" in the last 72 hours. Blood Sugar Average: Last 72 hrs:  · History of type 2 diabetes uncontrolled on insulin pump  · Has been recently prescribed Ozempic however has not started it just yet    · Plan  · Patient does not remember insulin carbohydrate ratio at this time and does not have insulin pump currently placed  · Continue carbohydrate controlled diet level 2  · We will start Lantus 20 units at bedtime as patient has had poor p.o. intake over the past 3 days  · Continue with Humalog 8 units 3 times daily  · Consider endocrinology consult upon discharge for insulin pump settings assistance.

## 2023-08-31 NOTE — Clinical Note
Case was discussed with ANNIE and the patient's admission status was agreed to be Admission Status: observation status to the service of Dr. Eloina Scott .

## 2023-08-31 NOTE — ASSESSMENT & PLAN NOTE
· POA WBC mildly elevated 12.74 however patient has remained afebrile, no fevers no chills  · Likely in the setting of urinary calculus however no pyelonephritis  · Plan  · Check UA has not been collected just yet  · Monitor for fever curve  · CBC a.m.

## 2023-08-31 NOTE — ED ATTENDING ATTESTATION
8/31/2023  I, Kaylee Arboleda MD, saw and evaluated the patient. I have discussed the patient with the resident/non-physician practitioner and agree with the resident's/non-physician practitioner's findings, Plan of Care, and MDM as documented in the resident's/non-physician practitioner's note, except where noted. All available labs and Radiology studies were reviewed. I was present for key portions of any procedure(s) performed by the resident/non-physician practitioner and I was immediately available to provide assistance. At this point I agree with the current assessment done in the Emergency Department.   I have conducted an independent evaluation of this patient a history and physical is as follows:SEE H AND P -AGREE WITH ER RESIDENT TX PLAN / DISPO    ED Course  ED Course as of 08/31/23 0536   Thu Aug 31, 2023   0142 Er md note- 8/26/23 er chart /labs/ urine/ imaging report all reviewed by er md   0142 Er md note- 12 lead ecg reviewed --  12 lead ecg reviewed by er md -- nsr rate of 96- no signs of ischemia/ injury / r heart strain / anuja/pericarditis compared to previous 12 lead ecg- 4/24/18  no sign changes   0252 Cxr portable- compared to previous 10/27/22- no sign changes- sternotomy- no change in mediastinum/cardiac silhouette- no free/sq air - no infiltrate- ptx- pulm edema- pleural effusions   0256 Er md note- during exam - pt had  right leg spasm - which is not new and began to hyperventilate -- which is what it sounds like pt did during initial eval with er resident -- pt currently states denies any cp/sob-- -- will not workup fro pe given elevated d dimer          Critical Care Time  Procedures

## 2023-08-31 NOTE — ASSESSMENT & PLAN NOTE
· POA calcium mildly elevated 10.6 with associated urolithiasis  · My exam patient seems hypovolemic at this time.     · Plan  · Isolated bolus of 500 cc  · Start IV Isolyte 75 cc/h  · Encourage p.o. intake  · If no improvement consider further work-up for hypercalcemia

## 2023-08-31 NOTE — ASSESSMENT & PLAN NOTE
· Remote history of provoked DVT/PE   · POA noted with mildly elevated D-dimer 1.10  · Upon my exam somewhat lower extremity swelling L>R although there is significant lipedema bilaterally however no tenderness to palpation or discomfort.   · VAS duplex US lower extremity

## 2023-08-31 NOTE — PROGRESS NOTES
[facility]  Progress Note  Name: Dominga Tsang  MRN: [id number]  Unit/Bed#: W -01 I Date of Admission: 8/31/2023   Date of Service: 8/31/2023 I Hospital Day: 0    Assessment/Plan   Leucocytosis  Assessment & Plan  · POA WBC mildly elevated 12.74 however patient has remained afebrile, no fevers no chills  · Likely in the setting of urinary calculus however no pyelonephritis  · Plan  · Monitor CBCs    Elevated d-dimer  Assessment & Plan  · Remote history of provoked DVT/PE   · POA noted with mildly elevated D-dimer 1.10  · Exam on admission somewhat lower extremity swelling L>R although there is significant lipedema bilaterally however no tenderness to palpation or discomfort. · VAS duplex US lower extremity: No acute DVT. 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 Hypercalcemia  Assessment & Plan  · POA calcium mildly elevated 10.6 with associated urolithiasis  · My exam patient seems hypovolemic at this time.     · Plan  · Isolated bolus of 500 cc  · Start IV Isolyte 75 cc/h  · Encourage p.o. intake  · If no improvement consider further work-up for hypercalcemia    Stage 4 chronic kidney disease [facility])  Assessment & Plan  Lab Results   Component Value Date    EGFR 25 08/31/2023    EGFR 28 08/26/2023    EGFR 31 06/28/2023    CREATININE 1.86 (H) 08/31/2023    CREATININE 1.73 (H) 08/26/2023    CREATININE 1.58 (H) 06/28/2023   · Pressure review patient's baseline seems to be 1.4-1.6  · Likely multifactorial prerenal and postrenal in the setting of poor p.o. 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  · Urinary retention protocol  · I's and O's      Anxiety  Assessment & Plan  · Continue home Cymbalta 60 mg once a day  · Continue home Xanax 0.5 mg twice daily as needed    Hypertensive Urgency  Assessment & Plan  · POA BP noted 180-207/80s however no associated symptoms such as headache, chest pain  · Has not been taking antihypertensive medications over the past 2 to 3 days  · At the ED received 1 dose of labetalol 10 mg.  · Per chart review patient most recently seen by PCP on July 28, 2023 with acceptable blood pressures in the 130s  · Blood Pressure: 136/80         · Plan:  · Continue home Imdur 60 mg once a day  · Continue Cardizem  mg once a day as per pharmacy formulary for Tiazac 300 mg  · Hold home losartan 100 mg per pharmacy formulary for irbesartan in setting of Cr> baseline. Type 2 diabetes mellitus with complication, with long-term current use of insulin [facility])  Assessment & Plan  Lab Results   Component Value Date    HGBA1C 10.4 (H) 06/13/2023       Recent Labs     08/31/23  0733 08/31/23  1044   POCGLU 281* 359*       Blood Sugar Average: Last 72 hrs:  · History of type 2 diabetes uncontrolled on insulin pump  · Has been recently prescribed Ozempic however has not started it just yet    · Plan  · Patient does not remember insulin carbohydrate ratio at this time and does not have insulin pump currently placed  · Continue carbohydrate controlled diet level 2  · We will start Lantus 20 units at bedtime as patient has had poor p.o. intake over the past 3 days  · Continue with Humalog 8 units 3 times daily  · Consider endocrinology consult upon discharge for insulin pump settings assistance. * Generalized abdominal pain  Assessment & Plan  · POA 5 to 6 days history of sharp generalized abdominal pain that waxes and wanes worsening last night 7/10 without associated nausea or vomiting however poor p.o. intake. Patient also endorsed suprapubic abdominal pain with associated urgency however no dysuria or frequency. Patient was just seen at the ED on 08/26/2023 for the same was diagnosed with right hydroureter related to 3 mm calculus for which was subsequently discharged on Flomax however patient never started medications.    · At the ED on 08/26/2023 CT abdominal pelvis did showed "mild right hydronephrosis and right hydroureter related to a 3 mm calculus in the right ureterovesical junction" and subsequently discharged from the ED with Flomax however patient never started. · Generalized abdominal pain likely due to right UPJ obstruction versus anterior abdominal hernia given tenderness upon palpation around the site however this is not incarcerated at this time. · Renal ultrasound 8/31 showed no hydronephrosis   · UA positive for glucose, protein, blood    · Plan:  · Start Flomax 0.4 mg once a day   · Start IV Isolyte 75 cc/h  · I's and O's             VTE Pharmacologic Prophylaxis: VTE Score: 7 High Risk (Score >/= 5) - Pharmacological DVT Prophylaxis Ordered: enoxaparin (Lovenox). Sequential Compression Devices Ordered. Education and Discussions with Family / Patient: Updated  () at bedside. Current Length of Stay: 0 day(s)  Current Patient Status: Observation   Discharge Plan: Anticipate discharge in 24-48 hrs to home. Code Status: Level 1 - Full Code    Subjective:   Patient seeing sitting up in bed this morning, and appeared comfortable. The patient says that she has been having right-sided abdominal pain which has worsened over the past week, and has been associated with urinary urgency and reduced appetite. She denies hematuria or dysuria. Her stools have been softer than usual in the past week. She has not been eating or drinking much at home. She says she has a remote history of kidney stones many years ago, which passed on their own each time. She was diagnosed with a 3mm kidney stone and prescribed Flowmax on 8/26, but the patient says that she was unable to pick it up from the pharmacy so she never began taking the Flowmax. She denies fever, chills, headaches, vision changes, CP, palpitations, SOB, cough, N/V, back pain, or leg swelling. She has neck pain, which is chronic. All questions answered.      Objective:     Vitals: Temp (24hrs), Av °F (36.7 °C), Min:97.5 °F (36.4 °C), Max:98.5 °F (36.9 °C)    Temp:  [97.5 °F (36.4 °C)-98.5 °F (36.9 °C)] 98.5 °F (36.9 °C)  HR:  [] 72  Resp:  [15-20] 15  BP: (134-207)/() 138/71  SpO2:  [92 %-98 %] 96 %  Body mass index is 39.63 kg/m². Input and Output Summary (last 24 hours): Intake/Output Summary (Last 24 hours) at 2023 1635  Last data filed at 2023 1242  Gross per 24 hour   Intake 320 ml   Output --   Net 320 ml       Physical Exam:   Physical Exam  Vitals and nursing note reviewed. Constitutional:       General: She is not in acute distress. HENT:      Head: Normocephalic and atraumatic. Nose: Nose normal.      Mouth/Throat:      Mouth: Mucous membranes are moist.      Pharynx: Oropharynx is clear. Cardiovascular:      Rate and Rhythm: Normal rate and regular rhythm. Pulses: Normal pulses. Heart sounds: Normal heart sounds. Pulmonary:      Effort: Pulmonary effort is normal.      Breath sounds: Normal breath sounds. Abdominal:      General: Bowel sounds are normal.      Palpations: Abdomen is soft. Tenderness: There is abdominal tenderness in the right lower quadrant. There is no right CVA tenderness, left CVA tenderness, guarding or rebound. Musculoskeletal:      Cervical back: Neck supple. Right lower leg: No edema. Left lower leg: No edema. Skin:     General: Skin is warm and dry. Neurological:      General: No focal deficit present. Mental Status: She is alert and oriented to person, place, and time.          Additional Data:     Labs:  Results from last 7 days   Lab Units 23  0106   WBC Thousand/uL 12.74*   HEMOGLOBIN g/dL 13.3   HEMATOCRIT % 41.3   PLATELETS Thousands/uL 302   NEUTROS PCT % 80*   LYMPHS PCT % 14   MONOS PCT % 5   EOS PCT % 0     Results from last 7 days   Lab Units 23  0106   SODIUM mmol/L 136   POTASSIUM mmol/L 4.5   CHLORIDE mmol/L 103   CO2 mmol/L 19*   BUN mg/dL 24 CREATININE mg/dL 1.86*   ANION GAP mmol/L 14   CALCIUM mg/dL 10.4*   ALBUMIN g/dL 4.5   TOTAL BILIRUBIN mg/dL 0.62   ALK PHOS U/L 115*   ALT U/L 16   AST U/L 27   GLUCOSE RANDOM mg/dL 96         Results from last 7 days   Lab Units 08/31/23  1613 08/31/23  1044 08/31/23  0733   POC GLUCOSE mg/dl 172* 359* 281*               Lines/Drains:  Invasive Devices     Peripheral Intravenous Line  Duration           Peripheral IV 08/31/23 Right;Ventral (anterior) Forearm <1 day                  Imaging: Reviewed radiology reports from this admission including: chest xray and ultrasound(s)  US kidney and bladder  Result Date: 8/31/2023  Impression: Small echogenic focus with twinkle artifact at the right UVJ, likely corresponding to the stone seen on prior CT. Bilateral ureteral jets detected. No hydronephrosis. XR chest 1 view portable  Result Date: 8/31/2023  Impression: No acute cardiopulmonary disease.        Recent Cultures (last 7 days):         Last 24 Hours Medication List:   Current Facility-Administered Medications   Medication Dose Route Frequency Provider Last Rate   • acetaminophen  650 mg Oral Q6H PRN Kaity Llamas MD     • ALPRAZolam  0.5 mg Oral BID PRN Kaity Llamas MD     • aspirin  81 mg Oral Daily Kaity Llamas MD     • atorvastatin  40 mg Oral Daily With [address] MD CAIN     • diltiazem  300 mg Oral Daily Kaity Llamas MD     • DULoxetine  60 mg Oral Daily Kaity Llamas MD     • enoxaparin  30 mg Subcutaneous Daily Kaity Llamas MD     • HYDROmorphone  0.5 mg Intravenous Q4H PRN Kaity Llamas MD     • insulin glargine  20 Units Subcutaneous HS Kaity Llamas MD     • insulin lispro  1-5 Units Subcutaneous HS Genevieve Simmonds, MD     • insulin lispro  1-6 Units Subcutaneous TID [address] Iniguez MD     • insulin lispro  8 Units Subcutaneous TID With [address] [facility] Odette Roper MD     • isosorbide mononitrate  60 mg Oral Daily Oswaldo Mills MD     • labetalol  10 mg Intravenous Q6H PRN Oswaldo Mlils MD     • levothyroxine  50 mcg Oral Early Morning Oswaldo Mills MD     • multi-electrolyte  500 mL Intravenous Once Oswaldo Mills MD     • multi-electrolyte  100 mL/hr Intravenous Continuous Oswaldo Mills  mL/hr (08/31/23 3627)   • ondansetron  4 mg Intravenous Q6H PRN Oswaldo Mills MD     • oxyCODONE  5 mg Oral Q6H PRN Oswaldo Mills MD     • oxyCODONE  2.5 mg Oral Q6H PRN Oswaldo Mills MD     • tamsulosin  0.4 mg Oral Daily With Willis Reddy MD          Today, Patient Was Seen By: Snehal Bell MD    **Please Note: This note may have been constructed using a voice recognition system. **

## 2023-08-31 NOTE — ASSESSMENT & PLAN NOTE
· POA BP noted 180-207/80s however no associated symptoms such as headache, chest pain  · Has not been taking antihypertensive medications over the past 2 to 3 days  · At the ED received 1 dose of labetalol 10 mg.  · Per chart review patient most recently seen by PCP on July 28, 2023 with acceptable blood pressures in the 130s  · Blood Pressure: 136/80         · Plan:  · Continue home Imdur 60 mg once a day  · Continue Cardizem  mg once a day as per pharmacy formulary for Tiazac 300 mg  · Hold home losartan 100 mg per pharmacy formulary for irbesartan in setting of Cr> baseline.

## 2023-08-31 NOTE — ASSESSMENT & PLAN NOTE
Lab Results   Component Value Date    EGFR 25 08/31/2023    EGFR 28 08/26/2023    EGFR 31 06/28/2023    CREATININE 1.86 (H) 08/31/2023    CREATININE 1.73 (H) 08/26/2023    CREATININE 1.58 (H) 06/28/2023   · Pressure review patient's baseline seems to be 1.4-1.6  · Likely multifactorial prerenal and postrenal in the setting of poor p.o. 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  · Urinary retention protocol  · I's and O's

## 2023-08-31 NOTE — ASSESSMENT & PLAN NOTE
Lab Results   Component Value Date    HGBA1C 10.4 (H) 06/13/2023       Recent Labs     08/31/23  0733 08/31/23  1044   POCGLU 281* 359*       Blood Sugar Average: Last 72 hrs:  · History of type 2 diabetes uncontrolled on insulin pump  · Has been recently prescribed Ozempic however has not started it just yet    · Plan  · Patient does not remember insulin carbohydrate ratio at this time and does not have insulin pump currently placed  · Continue carbohydrate controlled diet level 2  · We will start Lantus 20 units at bedtime as patient has had poor p.o. intake over the past 3 days  · Continue with Humalog 8 units 3 times daily  · Consider endocrinology consult upon discharge for insulin pump settings assistance.

## 2023-08-31 NOTE — ED PROVIDER NOTES
History  Chief Complaint   Patient presents with   • Abdominal Pain     Pt states she has constant, sharp abdominal pain in  bilateral upper quadrant. -N/V/D. Pt was recently discharged from the ER with a kidney stone prescribed Flomax but pt has not been taking it      44-year-old female with past medical history of hypertension, hyperlipidemia, diabetes, coronary artery disease, CKD, history of prior DVT, presents today for evaluation of abdominal pain and shortness of breath. Patient was seen in our ED 5 days ago for abdominal pain and was found to have a 3 mm calculus in the right ureterovesical junction with mild right hydronephrosis and right hydroureter. Patient unable to describe if this abdominal pain feels similar to what she was evaluated for several days ago. States this pain started worsening about 3 days ago. Describes sharp upper abdominal pain and separate lower abdominal pain that feels more crampy. Reports a few episodes of loose nonbloody stools that started today. Also reports associated nausea. Denies any fevers, chills, vomiting, urinary symptoms. Patient also reports epigastric pain that she believes may have radiated up into her chest earlier today with some associated shortness of breath. Denies back pain. Denies other concerns at this time. Abdominal Pain  Associated symptoms: chest pain, nausea and shortness of breath    Associated symptoms: no chills, no cough, no diarrhea, no dysuria, no fever, no hematuria, no sore throat and no vomiting        Prior to Admission Medications   Prescriptions Last Dose Informant Patient Reported? Taking? ALPRAZolam (XANAX) 0.5 mg tablet   No No   Sig: TAKE 1 TABLET (0.5 MG TOTAL) BY MOUTH 2 (TWO) TIMES A DAY AS NEEDED FOR ANXIETY.    B-D UF III MINI PEN NEEDLES 31G X 5 MM MISC  Self Yes No   BAQSIMI TWO PACK 3 MG/DOSE POWD  Self Yes No   Sig: Use as directed    BD Insulin Syringe U/F 31G X 5/16" 0.5 ML MISC  Self Yes No   Si (four) times a day As directed   Coenzyme Q10 (Co Q-10) 200 MG CAPS  Self Yes No   Sig: Take by mouth in the morning   Continuous Blood Gluc Sensor (Dexcom G6 Sensor) MISC   Yes No   Sig: Use 1 each   DULoxetine (CYMBALTA) 60 mg delayed release capsule   No No   Sig: TAKE 1 CAPSULE BY MOUTH EVERY DAY   Docusate Sodium (DSS) 100 MG CAPS   Yes No   Sig: Take 1 capsule by mouth every 12 (twelve) hours   Insulin Disposable Pump (OmniPod Dash 5 Pack Pods) MISC  Self Yes No   Sig: USE 1 POD EVERY 1.5 DAYS   NovoLOG 100 UNIT/ML SOLN  Self Yes No   Sig: INFUSE UNDER THE SKIN VIA INSULIN PUMP AS DIRECTED. TOTAL DAILY DOSE  UNITS   Ozempic, 0.25 or 0.5 MG/DOSE, 2 MG/3ML injection pen   Yes No   Sig: INJECT 0.25 MG UNDER THE SKIN ONCE A WEEK.    aspirin 81 MG tablet  Self Yes No   Sig: Take 81 mg by mouth daily   cholecalciferol (VITAMIN D3) 1,000 units tablet  Self Yes No   Sig: Take 2,000 Units by mouth daily   diclofenac sodium (VOLTAREN) 1 %  Self No No   Sig: Apply 2 g topically 4 (four) times a day   diltiazem (TIAZAC) 300 MG 24 hr capsule  Self Yes No   Sig: Take 300 mg by mouth daily   fluocinonide (LIDEX) 0.05 % external solution   Yes No   fluticasone (FLONASE) 50 mcg/act nasal spray  Self No No   Si spray into each nostril daily   furosemide (LASIX) 20 mg tablet   Yes No   Patient not taking: Reported on 2023   guaiFENesin (MUCINEX) 600 mg 12 hr tablet  Self No No   Sig: Take 2 tablets (1,200 mg total) by mouth every 12 (twelve) hours   Patient not taking: Reported on 2023   insulin aspart (NovoLOG) 100 units/mL injection  Self No No   Sig: Inject 12 Units under the skin 3 (three) times a day before meals   irbesartan (AVAPRO) 300 mg tablet  Self Yes No   Sig: Take 300 mg by mouth   isosorbide mononitrate (IMDUR) 60 mg 24 hr tablet  Self Yes No   Sig: TAKE 1.5 TABs AT NIGHT   levothyroxine 50 mcg tablet  Self Yes No   Sig: Take 50 mcg by mouth daily   naloxone (NARCAN) 4 mg/0.1 mL nasal spray  Self No No Sig: Administer 1 spray into a nostril. If no response after 2-3 minutes, give another dose in the other nostril using a new spray. nitroglycerin (NITROLINGUAL) 0.4 mg/spray spray  Self Yes No   Sig: USE ONE SPRAY Q 5 MINUTES AS NEEDED FOR CHEST PAIN. IF NO RELIEF, CALL 911. rosuvastatin (CRESTOR) 20 MG tablet  Self Yes No   Sig: Take 20 mg by mouth every evening     tamsulosin (FLOMAX) 0.4 mg   No No   Sig: Take 1 capsule (0.4 mg total) by mouth daily with dinner Do not start before August 27, 2023.       Facility-Administered Medications: None       Past Medical History:   Diagnosis Date   • Acute embolism and thrombosis of unspecified deep veins of unspecified lower extremity (HCC)     Last Assessed:  5/18/17   • Anemia    • Anosmia    • Anxiety    • Arthritis    • Asthma    • Back pain    • Bilateral macular retinal edema    • CAD (coronary artery disease)    • Cataract    • Cervical disc herniation    • Cervical radiculopathy    • Cervical spinal stenosis    • Cervical spondylolysis    • Chronic kidney disease    • Chronic mastoiditis    • Colon polyp    • Complex endometrial hyperplasia    • Depression    • Diabetes mellitus (HCC)    • Disease of thyroid gland    • DVT (deep venous thrombosis) ([facility])    • DVT (deep venous thrombosis) ([address]) 6/16/2017   • Fibromyalgia    • Fibromyalgia, primary    • Hyperlipidemia    • Hypertension    • Hypothyroid    • Kidney stone    • Lumbar radiculopathy    • Neck pain    • Obese    • PONV (postoperative nausea and vomiting)    • Shingles 07/01/2021   • Spinal stenosis    • Stomach ulcer    • Thyroid disease        Past Surgical History:   Procedure Laterality Date   • BACK SURGERY     • CARPAL TUNNEL RELEASE     • CATARACT EXTRACTION     • CHOLECYSTECTOMY     • COLONOSCOPY     • CORONARY ANGIOPLASTY     • CORONARY ARTERY BYPASS GRAFT     • CYSTOSCOPY N/A 6/20/2017    Procedure: CYSTOSCOPY;  Surgeon: Kirti Villa MD;  [facility];  Service: Gynecology Oncology   • CYSTOSCOPY     • SD LAPS TOTAL HYSTERECT 250 GM/< W/RMVL TUBE/OVARY N/A 2017    Procedure: ROBOTIC HYSTERECTOMY; BILATERAL SALPINO-OOPHERECTOMY; umbilical hernia repair.;  Surgeon: Roman Mccartney MD;  Location[facility];  Service: Gynecology Oncology   • SD REPAIR FIRST ABDOMINAL WALL HERNIA N/A 2022    Procedure: REPAIR HERNIA INCISIONAL;  Surgeon: Yeison Rouse DO;  Location[facility];  Service: General   • TONSILECTOMY AND ADNOIDECTOMY     • TONSILLECTOMY     • UMBILICAL HERNIA REPAIR         Family History   Problem Relation Age of Onset   • Arthritis Mother    • Leukemia Mother    • Other Mother         Anxiety, major depressive disorder, recurrent episode with atypical features   • Coronary artery disease Father         Heart problem   • Diabetes Father    • Other Father         Infectious disease   • Alzheimer's disease Maternal Grandmother    • Other Maternal Grandfather         Heart problem   • Other Daughter         Anxiety, major depressive disorder, recurrent episode with atypical features   • Alcohol abuse Other         Grandparent   • Cancer Family    • Diabetes Family    • Hypertension Family    • Other Family         Reported prior back trouble, thyroid disorder     I have reviewed and agree with the history as documented. E-Cigarette/Vaping   • E-Cigarette Use Never User      E-Cigarette/Vaping Substances   • Nicotine No    • THC No    • CBD No    • Flavoring No      Social History     Tobacco Use   • Smoking status: Former     Packs/day: 1.00     Years: 6.00     Total pack years: 6.00     Types: Cigarettes     Quit date: 47     Years since quittin.6   • Smokeless tobacco: Never   • Tobacco comments:     Smoked about a half a pack for about 4 yrs. Quite about 50 yrs ago. Vaping Use   • Vaping Use: Never used   Substance Use Topics   • Alcohol use: No   • Drug use: No        Review of Systems   Constitutional: Negative for chills and fever.    HENT: Negative for ear pain and sore throat. Eyes: Negative for pain and visual disturbance. Respiratory: Positive for shortness of breath. Negative for cough. Cardiovascular: Positive for chest pain. Negative for palpitations. Gastrointestinal: Positive for abdominal pain and nausea. Negative for diarrhea and vomiting. Genitourinary: Negative for dysuria and hematuria. Musculoskeletal: Negative for arthralgias and back pain. Skin: Negative for color change and rash. Neurological: Negative for seizures and syncope. All other systems reviewed and are negative. Physical Exam  ED Triage Vitals   Temperature Pulse Respirations Blood Pressure SpO2   08/31/23 0034 08/31/23 0031 08/31/23 0031 08/31/23 0031 08/31/23 0031   97.5 °F (36.4 °C) 79 20 (!) 184/87 98 %      Temp Source Heart Rate Source Patient Position - Orthostatic VS BP Location FiO2 (%)   08/31/23 0034 08/31/23 0031 08/31/23 0031 08/31/23 0031 --   Oral Monitor Sitting Left arm       Pain Score       08/31/23 0147       7             Orthostatic Vital Signs  Vitals:    08/31/23 0031 08/31/23 0200 08/31/23 0300   BP: (!) 184/87 (!) 181/93 (!) 207/94   Pulse: 79 93 96   Patient Position - Orthostatic VS: Sitting         Physical Exam  Vitals and nursing note reviewed. Constitutional:       General: She is not in acute distress. Appearance: She is well-developed. She is obese. She is not ill-appearing, toxic-appearing or diaphoretic. Comments: BMI 40   HENT:      Head: Normocephalic and atraumatic. Eyes:      Conjunctiva/sclera: Conjunctivae normal.   Cardiovascular:      Rate and Rhythm: Normal rate and regular rhythm. Heart sounds: No murmur heard. Pulmonary:      Effort: Tachypnea present. No respiratory distress. Breath sounds: Normal breath sounds. Abdominal:      General: There is distension. Palpations: Abdomen is soft. Tenderness: There is no abdominal tenderness. There is no guarding or rebound. Musculoskeletal:         General: No swelling. Cervical back: Neck supple. Skin:     General: Skin is warm and dry. Capillary Refill: Capillary refill takes less than 2 seconds. Neurological:      Mental Status: She is alert.    Psychiatric:         Mood and Affect: Mood normal.         ED Medications  Medications   lactated ringers bolus 500 mL (0 mL Intravenous Stopped 8/31/23 0300)   HYDROmorphone (DILAUDID) injection 0.5 mg (0.5 mg Intravenous Given 8/31/23 0147)   labetalol (NORMODYNE) injection 10 mg (10 mg Intravenous Given 8/31/23 0425)       Diagnostic Studies  Results Reviewed     Procedure Component Value Units Date/Time    HS Troponin I 2hr [[id number]]  (Normal) Collected: 08/31/23 0324    Lab Status: Final result Specimen: Blood from Arm, Left Updated: 08/31/23 0403     hs TnI 2hr 8 ng/L      Delta 2hr hsTnI -2 ng/L     HS Troponin I 4hr [[id number]]     Lab Status: No result Specimen: Blood     HS Troponin 0hr (reflex protocol) [[id number]]  (Normal) Collected: 08/31/23 0106    Lab Status: Final result Specimen: Blood from Arm, Right Updated: 08/31/23 0153     hs TnI 0hr 10 ng/L     Comprehensive metabolic panel [[id number]]  (Abnormal) Collected: 08/31/23 0106    Lab Status: Final result Specimen: Blood from Arm, Right Updated: 08/31/23 0151     Sodium 136 mmol/L      Potassium 4.5 mmol/L      Chloride 103 mmol/L      CO2 19 mmol/L      ANION GAP 14 mmol/L      BUN 24 mg/dL      Creatinine 1.86 mg/dL      Glucose 96 mg/dL      Calcium 10.4 mg/dL      AST 27 U/L      ALT 16 U/L      Alkaline Phosphatase 115 U/L      Total Protein 8.9 g/dL      Albumin 4.5 g/dL      Total Bilirubin 0.62 mg/dL      eGFR 25 ml/min/1.73sq m     Narrative:      [place] guidelines for Chronic Kidney Disease (CKD):   •  Stage 1 with normal or high GFR (GFR > 90 mL/min/1.73 square meters)  •  Stage 2 Mild CKD (GFR = 60-89 mL/min/1.73 square meters)  •  Stage 3A Moderate CKD (GFR = 45-59 mL/min/1.73 square meters)  •  Stage 3B Moderate CKD (GFR = 30-44 mL/min/1.73 square meters)  •  Stage 4 Severe CKD (GFR = 15-29 mL/min/1.73 square meters)  •  Stage 5 End Stage CKD (GFR <15 mL/min/1.73 square meters)  Note: GFR calculation is accurate only with a steady state creatinine    Lipase [[id number]]  (Normal) Collected: 08/31/23 0106    Lab Status: Final result Specimen: Blood from Arm, Right Updated: 08/31/23 0151     Lipase 15 u/L     D-Dimer [[id number]]  (Abnormal) Collected: 08/31/23 0106    Lab Status: Final result Specimen: Blood from Arm, Right Updated: 08/31/23 0146     D-Dimer, Quant 1.10 ug/ml FEU     Narrative: In the evaluation for possible pulmonary embolism, in the appropriate (Well's Score of 4 or less) patient, the age adjusted d-dimer cutoff for this patient can be calculated as:    Age x 0.01 (in ug/mL) for Age-adjusted D-dimer exclusion threshold for a patient over 50 years.     UA (URINE) with reflex to Scope [[id number]]     Lab Status: No result Specimen: Urine     CBC and differential [[id number]]  (Abnormal) Collected: 08/31/23 0106    Lab Status: Final result Specimen: Blood from Arm, Right Updated: 08/31/23 0128     WBC 12.74 Thousand/uL      RBC 4.97 Million/uL      Hemoglobin 13.3 g/dL      Hematocrit 41.3 %      MCV 83 fL      MCH 26.8 pg      MCHC 32.2 g/dL      RDW 15.8 %      MPV 10.4 fL      Platelets 908 Thousands/uL      nRBC 0 /100 WBCs      Neutrophils Relative 80 %      Immat GRANS % 0 %      Lymphocytes Relative 14 %      Monocytes Relative 5 %      Eosinophils Relative 0 %      Basophils Relative 1 %      Neutrophils Absolute 10.13 Thousands/µL      Immature Grans Absolute 0.05 Thousand/uL      Lymphocytes Absolute 1.84 Thousands/µL      Monocytes Absolute 0.65 Thousand/µL      Eosinophils Absolute 0.01 Thousand/µL      Basophils Absolute 0.06 Thousands/µL     Blood gas, venous [[id number]]  (Abnormal) Collected: 08/31/23 0106    Lab Status: Final result Specimen: Blood from Arm, Right Updated: 08/31/23 0127     pH, Dae 7.497     pCO2, Dae 23.6 mm Hg      pO2, Dae 50.3 mm Hg      HCO3, Dae 17.9 mmol/L      Base Excess, Dae -3.3 mmol/L      O2 Content, Dae 17.4 ml/dL      O2 HGB, VENOUS 86.0 %     UA w Reflex to Microscopic w Reflex to Culture [[id number]]     Lab Status: No result Specimen: Urine                  XR chest 1 view portable   ED Interpretation by Kodak Crain MD (08/31 0320)   No acute abnormalities            Procedures  ECG 12 Lead Documentation Only    Date/Time: 8/31/2023 12:35 AM    Performed by: Kodak Crain MD  Authorized by: Kodak Crain MD    Indications / Diagnosis:  SOB  ECG reviewed by me, the ED Provider: yes    Patient location:  ED  Interpretation:     Interpretation: normal    Rate:     ECG rate:  96    ECG rate assessment: normal    Rhythm:     Rhythm: sinus rhythm    Ectopy:     Ectopy: none    QRS:     QRS axis:  Left    QRS intervals:  Normal  Conduction:     Conduction: normal    ST segments:     ST segments:  Normal  T waves:     T waves: non-specific    Comments:      Sinus rhythm, left axis, normal intervals, nonspecific T waves in inferior leads          ED Course                                       Medical Decision Making  64-year-old female with past medical history of hypertension, hyperlipidemia, diabetes, coronary artery disease, CKD, history of prior DVT, presents today for evaluation of abdominal pain and shortness of breath. Patient was seen in our ED a few days ago and found to have 3 mm calculus in the right ureterovesical junction with mild right hydronephrosis and right hydroureter. Patient is a poor historian but is describing right-sided abdominal pain. Believe this to be secondary to her kidney stone. Patient states she has not passed the kidney stone at home yet and she has not picked up her Flomax as she did not realize what it was for.   On initial assessment, patient was tachypneic but had difficulty elaborating on this further. On later assessment, tachypnea completely resolved and patient had no evidence of increased work of breathing. At this time, patient denied any chest pain or shortness of breath. Patient did have an episode of abdominal pain and muscle spasm which we are attributing as the most likely cause to her increased work of breathing. Very low suspicion for PE at this time. We will hold off on further testing. Patient requiring narcotics and requesting observation for pain control given her return to the emergency department for pain secondary to obstructing kidney stone. Awaiting results of urine. Patient is afebrile, nonseptic, doubt infected kidney stone at this time. Amount and/or Complexity of Data Reviewed  Labs: ordered. Radiology: ordered and independent interpretation performed. Risk  Prescription drug management. Decision regarding hospitalization. Disposition  Final diagnoses:   Abdominal pain   Calculus of ureterovesical junction (UVJ)   Hydronephrosis   Leukocytosis     Time reflects when diagnosis was documented in both MDM as applicable and the Disposition within this note     Time User Action Codes Description Comment    8/31/2023  4:09 AM Brendolyn Pat Add [R10.9] Abdominal pain     8/31/2023  4:09 AM Brendolyn Pat Add [N20.1] Calculus of ureterovesical junction (UVJ)     8/31/2023  4:09 AM Brendolyn Pat Add [N13.30] Hydronephrosis     8/31/2023  4:10 AM Brendolyn Pat Add [J27.041] Leukocytosis       ED Disposition     ED Disposition   Admit    Condition   Stable    Date/Time   Thu Aug 31, 2023  4:09 AM    Comment   Case was discussed with ANNIE and the patient's admission status was agreed to be Admission Status: observation status to the service of Dr. Dariana Dsouza . Follow-up Information    None         Patient's Medications   Discharge Prescriptions    No medications on file     No discharge procedures on file.     PDMP Review Value Time User    PDMP Reviewed  Yes 8/21/2023  2:15 PM Bart Ely DO           ED Provider  Attending physically available and evaluated Crystal Siddiqi. I managed the patient along with the ED Attending.     Electronically Signed by         Ken Ace MD  08/31/23 0813

## 2023-08-31 NOTE — ASSESSMENT & PLAN NOTE
· POA 5 to 6 days history of sharp generalized abdominal pain that waxes and wanes worsening last night 7/10 without associated nausea or vomiting however poor p.o. intake. Patient also endorsed suprapubic abdominal pain with associated urgency however no dysuria or frequency. Patient was just seen at the ED on 08/26/2023 for the same was diagnosed with right hydroureter related to 3 mm calculus for which was subsequently discharged on Flomax however patient never started medications. · At the ED on 08/26/2023 CT abdominal pelvis did showed "mild right hydronephrosis and right hydroureter related to a 3 mm calculus in the right ureterovesical junction" and subsequently discharged from the ED with Flomax however patient never started. · Generalized abdominal pain likely due to right UPJ obstruction versus anterior abdominal hernia given tenderness upon palpation around the site however this is not incarcerated at this time.   · Renal ultrasound 8/31 showed no hydronephrosis   · UA positive for glucose, protein, blood    · Plan:  · Start Flomax 0.4 mg once a day   · Start IV Isolyte 75 cc/h  · I's and O's

## 2023-08-31 NOTE — ASSESSMENT & PLAN NOTE
· Continue home Cymbalta 60 mg once a day  · Continue home Xanax 0.5 mg twice daily as needed Elidel Counseling: Patient may experience a mild burning sensation during topical application. Elidel is not approved in children less than 2 years of age. There have been case reports of hematologic and skin malignancies in patients using topical calcineurin inhibitors although causality is questionable.

## 2023-08-31 NOTE — ASSESSMENT & PLAN NOTE
· Remote history of provoked DVT/PE   · POA noted with mildly elevated D-dimer 1.10  · Exam on admission somewhat lower extremity swelling L>R although there is significant lipedema bilaterally however no tenderness to palpation or discomfort. · VAS duplex US lower extremity: No acute DVT. 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

## 2023-09-01 PROBLEM — R10.9 RIGHT SIDED ABDOMINAL PAIN: Status: ACTIVE | Noted: 2023-08-31

## 2023-09-01 PROBLEM — K59.00 CONSTIPATION: Status: ACTIVE | Noted: 2023-09-01

## 2023-09-01 LAB
ALBUMIN SERPL BCP-MCNC: 3.8 G/DL (ref 3.5–5)
ALP SERPL-CCNC: 90 U/L (ref 34–104)
ALT SERPL W P-5'-P-CCNC: 13 U/L (ref 7–52)
ANION GAP SERPL CALCULATED.3IONS-SCNC: 10 MMOL/L
AST SERPL W P-5'-P-CCNC: 20 U/L (ref 13–39)
BASOPHILS # BLD AUTO: 0.04 THOUSANDS/ÂΜL (ref 0–0.1)
BASOPHILS NFR BLD AUTO: 0 % (ref 0–1)
BILIRUB SERPL-MCNC: 0.72 MG/DL (ref 0.2–1)
BUN SERPL-MCNC: 28 MG/DL (ref 5–25)
CALCIUM SERPL-MCNC: 8.9 MG/DL (ref 8.4–10.2)
CHLORIDE SERPL-SCNC: 97 MMOL/L (ref 96–108)
CO2 SERPL-SCNC: 24 MMOL/L (ref 21–32)
CREAT SERPL-MCNC: 1.85 MG/DL (ref 0.6–1.3)
EOSINOPHIL # BLD AUTO: 0.02 THOUSAND/ÂΜL (ref 0–0.61)
EOSINOPHIL NFR BLD AUTO: 0 % (ref 0–6)
ERYTHROCYTE [DISTWIDTH] IN BLOOD BY AUTOMATED COUNT: 15.8 % (ref 11.6–15.1)
GFR SERPL CREATININE-BSD FRML MDRD: 26 ML/MIN/1.73SQ M
GLUCOSE SERPL-MCNC: 111 MG/DL (ref 65–140)
GLUCOSE SERPL-MCNC: 168 MG/DL (ref 65–140)
GLUCOSE SERPL-MCNC: 194 MG/DL (ref 65–140)
GLUCOSE SERPL-MCNC: 234 MG/DL (ref 65–140)
GLUCOSE SERPL-MCNC: 247 MG/DL (ref 65–140)
HCT VFR BLD AUTO: 34.6 % (ref 34.8–46.1)
HGB BLD-MCNC: 11 G/DL (ref 11.5–15.4)
IMM GRANULOCYTES # BLD AUTO: 0.05 THOUSAND/UL (ref 0–0.2)
IMM GRANULOCYTES NFR BLD AUTO: 1 % (ref 0–2)
LYMPHOCYTES # BLD AUTO: 2.03 THOUSANDS/ÂΜL (ref 0.6–4.47)
LYMPHOCYTES NFR BLD AUTO: 22 % (ref 14–44)
MCH RBC QN AUTO: 27.6 PG (ref 26.8–34.3)
MCHC RBC AUTO-ENTMCNC: 31.8 G/DL (ref 31.4–37.4)
MCV RBC AUTO: 87 FL (ref 82–98)
MONOCYTES # BLD AUTO: 0.65 THOUSAND/ÂΜL (ref 0.17–1.22)
MONOCYTES NFR BLD AUTO: 7 % (ref 4–12)
NEUTROPHILS # BLD AUTO: 6.67 THOUSANDS/ÂΜL (ref 1.85–7.62)
NEUTS SEG NFR BLD AUTO: 70 % (ref 43–75)
NRBC BLD AUTO-RTO: 0 /100 WBCS
PLATELET # BLD AUTO: 251 THOUSANDS/UL (ref 149–390)
PMV BLD AUTO: 10.3 FL (ref 8.9–12.7)
POTASSIUM SERPL-SCNC: 4.2 MMOL/L (ref 3.5–5.3)
PROT SERPL-MCNC: 7.3 G/DL (ref 6.4–8.4)
RBC # BLD AUTO: 3.99 MILLION/UL (ref 3.81–5.12)
SODIUM SERPL-SCNC: 131 MMOL/L (ref 135–147)
WBC # BLD AUTO: 9.46 THOUSAND/UL (ref 4.31–10.16)

## 2023-09-01 PROCEDURE — 80053 COMPREHEN METABOLIC PANEL: CPT

## 2023-09-01 PROCEDURE — 85025 COMPLETE CBC W/AUTO DIFF WBC: CPT

## 2023-09-01 PROCEDURE — 99232 SBSQ HOSP IP/OBS MODERATE 35: CPT | Performed by: INTERNAL MEDICINE

## 2023-09-01 PROCEDURE — 82948 REAGENT STRIP/BLOOD GLUCOSE: CPT

## 2023-09-01 RX ORDER — AMOXICILLIN 250 MG
2 CAPSULE ORAL 2 TIMES DAILY
Status: DISCONTINUED | OUTPATIENT
Start: 2023-09-01 | End: 2023-09-02 | Stop reason: HOSPADM

## 2023-09-01 RX ORDER — POLYETHYLENE GLYCOL 3350 17 G/17G
17 POWDER, FOR SOLUTION ORAL DAILY
Status: DISCONTINUED | OUTPATIENT
Start: 2023-09-01 | End: 2023-09-02 | Stop reason: HOSPADM

## 2023-09-01 RX ADMIN — ALPRAZOLAM 0.5 MG: 0.5 TABLET ORAL at 01:54

## 2023-09-01 RX ADMIN — INSULIN LISPRO 8 UNITS: 100 INJECTION, SOLUTION INTRAVENOUS; SUBCUTANEOUS at 17:34

## 2023-09-01 RX ADMIN — INSULIN LISPRO 8 UNITS: 100 INJECTION, SOLUTION INTRAVENOUS; SUBCUTANEOUS at 11:57

## 2023-09-01 RX ADMIN — SODIUM CHLORIDE, SODIUM GLUCONATE, SODIUM ACETATE, POTASSIUM CHLORIDE, MAGNESIUM CHLORIDE, SODIUM PHOSPHATE, DIBASIC, AND POTASSIUM PHOSPHATE 100 ML/HR: .53; .5; .37; .037; .03; .012; .00082 INJECTION, SOLUTION INTRAVENOUS at 22:17

## 2023-09-01 RX ADMIN — DULOXETINE HYDROCHLORIDE 60 MG: 60 CAPSULE, DELAYED RELEASE ORAL at 08:19

## 2023-09-01 RX ADMIN — SODIUM CHLORIDE, SODIUM GLUCONATE, SODIUM ACETATE, POTASSIUM CHLORIDE, MAGNESIUM CHLORIDE, SODIUM PHOSPHATE, DIBASIC, AND POTASSIUM PHOSPHATE 100 ML/HR: .53; .5; .37; .037; .03; .012; .00082 INJECTION, SOLUTION INTRAVENOUS at 07:01

## 2023-09-01 RX ADMIN — OXYCODONE HYDROCHLORIDE 5 MG: 5 TABLET ORAL at 01:54

## 2023-09-01 RX ADMIN — DILTIAZEM HYDROCHLORIDE 300 MG: 180 CAPSULE, COATED, EXTENDED RELEASE ORAL at 08:19

## 2023-09-01 RX ADMIN — ENOXAPARIN SODIUM 30 MG: 30 INJECTION SUBCUTANEOUS at 08:19

## 2023-09-01 RX ADMIN — ALPRAZOLAM 0.5 MG: 0.5 TABLET ORAL at 22:47

## 2023-09-01 RX ADMIN — TAMSULOSIN HYDROCHLORIDE 0.4 MG: 0.4 CAPSULE ORAL at 17:32

## 2023-09-01 RX ADMIN — LEVOTHYROXINE SODIUM 50 MCG: 50 TABLET ORAL at 05:38

## 2023-09-01 RX ADMIN — INSULIN LISPRO 2 UNITS: 100 INJECTION, SOLUTION INTRAVENOUS; SUBCUTANEOUS at 11:57

## 2023-09-01 RX ADMIN — ASPIRIN 81 MG: 81 TABLET, COATED ORAL at 08:19

## 2023-09-01 RX ADMIN — ATORVASTATIN CALCIUM 40 MG: 40 TABLET, FILM COATED ORAL at 17:32

## 2023-09-01 RX ADMIN — INSULIN GLARGINE 20 UNITS: 100 INJECTION, SOLUTION SUBCUTANEOUS at 22:13

## 2023-09-01 RX ADMIN — SENNOSIDES AND DOCUSATE SODIUM 2 TABLET: 50; 8.6 TABLET ORAL at 17:32

## 2023-09-01 RX ADMIN — INSULIN LISPRO 3 UNITS: 100 INJECTION, SOLUTION INTRAVENOUS; SUBCUTANEOUS at 08:24

## 2023-09-01 RX ADMIN — ISOSORBIDE MONONITRATE 60 MG: 60 TABLET, EXTENDED RELEASE ORAL at 08:19

## 2023-09-01 RX ADMIN — ONDANSETRON 4 MG: 2 INJECTION INTRAMUSCULAR; INTRAVENOUS at 01:54

## 2023-09-01 RX ADMIN — INSULIN LISPRO 8 UNITS: 100 INJECTION, SOLUTION INTRAVENOUS; SUBCUTANEOUS at 08:22

## 2023-09-01 RX ADMIN — INSULIN LISPRO 1 UNITS: 100 INJECTION, SOLUTION INTRAVENOUS; SUBCUTANEOUS at 22:14

## 2023-09-01 RX ADMIN — SODIUM CHLORIDE, SODIUM GLUCONATE, SODIUM ACETATE, POTASSIUM CHLORIDE, MAGNESIUM CHLORIDE, SODIUM PHOSPHATE, DIBASIC, AND POTASSIUM PHOSPHATE 100 ML/HR: .53; .5; .37; .037; .03; .012; .00082 INJECTION, SOLUTION INTRAVENOUS at 13:19

## 2023-09-01 NOTE — ASSESSMENT & PLAN NOTE
Patients abdomen distended on examination  Reported they have been eating out regularly recently  Plan  Miralax daily  Sennakot two tablets twice daily

## 2023-09-01 NOTE — DISCHARGE INSTR - AVS FIRST PAGE
Dear Jarod Delgado,     It was our pleasure to care for you here at [facility]. It is our hope that we were always able to exceed the expected standards for your care during your stay. You were hospitalized due to a kidney stone associated with abdominal pain. You were cared for on the [facility] 4th floor by Zach Thibodeaux MD under the service of Rita Deleon DO with the [facility] who covers for your primary care physician (PCP), Sathya De Luna DO, while you were hospitalized. If you have any questions or concerns related to this hospitalization, you may contact us at [phone]. For follow up as well as any medication refills, we recommend that you follow up with your primary care physician. A registered nurse will reach out to you by phone within a few days after your discharge to answer any additional questions that you may have after going home. However, at this time we provide for you here, the most important instructions / recommendations at discharge:     Notable Medication Adjustments -   Please start taking tamsulosin (Flomax) 1 capsule (0.4 mg) by mouth daily with dinner. Please start taking polyethylene glycol (MiraLAX) 1 packet (17 g) by mouth daily until having regular bowel movements, then stop. Please start taking senna docusate (Senokot) 2 tablets by mouth 2 times a day until having regular bowel movements, then stop. Testing Required after Discharge -   Please check a basic metabolic panel to monitor your kidney function 1 week from discharge from the hospital.  Important follow up information -   Please follow-up with your primary care physician within 1 week of discharge from the hospital.  Please follow-up with your psychiatrist as soon as possible to address anxiety and depression.   Other Instructions -   Once regular bowel movements have resumed, recommend discontinuing MiraLAX and Senokot and starting Metamucil fiber supplement 1 pack/day. Please review this entire after visit summary as additional general instructions including medication list, appointments, activity, diet, any pertinent wound care, and other additional recommendations from your care team that may be provided for you.       Sincerely,     Jeoffrey Gitelman, MD

## 2023-09-01 NOTE — ASSESSMENT & PLAN NOTE
Lab Results   Component Value Date    HGBA1C 10.4 (H) 06/13/2023       Recent Labs     08/31/23  0733 08/31/23  1044 08/31/23  1613 08/31/23 2122   POCGLU 281* 359* 172* 147*       Blood Sugar Average: Last 72 hrs:  · History of type 2 diabetes uncontrolled on insulin pump  · Has been recently prescribed Ozempic however has not started it just yet    · Plan  · Patient does not remember insulin carbohydrate ratio at this time and does not have insulin pump currently placed  · Continue carbohydrate controlled diet level 2  · We started her on Lantus 20 units at bedtime as patient has had poor p.o. intake over the past 3 days  · Continue with Humalog 8 units 3 times daily  · Consider endocrinology consult upon discharge for insulin pump settings assistance.

## 2023-09-01 NOTE — ASSESSMENT & PLAN NOTE
· POA 5 to 6 days history of sharp generalized abdominal pain that waxes and wanes worsening last night 7/10 without associated nausea or vomiting however poor p.o. intake. Patient also endorsed suprapubic abdominal pain with associated urgency however no dysuria or frequency. Patient was just seen at the ED on 08/26/2023 for the same was diagnosed with right hydroureter related to 3 mm calculus for which was subsequently discharged on Flomax however patient never started medications. · At the ED on 08/26/2023 CT abdominal pelvis did showed "mild right hydronephrosis and right hydroureter related to a 3 mm calculus in the right ureterovesical junction" and subsequently discharged from the ED with Flomax however patient never started. · Generalized abdominal pain likely due to right UPJ obstruction versus anterior abdominal hernia given tenderness upon palpation around the site however this is not incarcerated at this time.   · Renal ultrasound 8/31 showed no hydronephrosis   · UA positive for glucose, protein, blood    · Plan:  · Continue Flomax 0.4 mg once a day   · Strain all urine  · Pain medications as needed  · Continue IV Isolyte 100cc/h  · I's and O's

## 2023-09-01 NOTE — PROGRESS NOTES
[facility]  Progress Note  Name: Dominga Tsang  MRN: [id number]  Unit/Bed#: W -01 I Date of Admission: 8/31/2023   Date of Service: 9/1/2023 I Hospital Day: 0    Assessment/Plan   * Right sided abdominal pain, secondary to ureterolithiasis  Assessment & Plan  · POA 5 to 6 days history of sharp generalized abdominal pain that waxes and wanes worsening last night 7/10 without associated nausea or vomiting however poor p.o. intake. Patient also endorsed suprapubic abdominal pain with associated urgency however no dysuria or frequency. Patient was just seen at the ED on 08/26/2023 for the same was diagnosed with right hydroureter related to 3 mm calculus for which was subsequently discharged on Flomax however patient never started medications. · At the ED on 08/26/2023 CT abdominal pelvis did showed "mild right hydronephrosis and right hydroureter related to a 3 mm calculus in the right ureterovesical junction" and subsequently discharged from the ED with Flomax however patient never started. · Generalized abdominal pain likely due to right UPJ obstruction versus anterior abdominal hernia given tenderness upon palpation around the site however this is not incarcerated at this time.   · Renal ultrasound 8/31 showed no hydronephrosis   · UA positive for glucose, protein, blood    · Plan:  · Continue Flomax 0.4 mg once a day   · Strain all urine  · Pain medications as needed  · Continue IV Isolyte 100cc/h  · I's and O's      Constipation  Assessment & Plan  Patients abdomen distended on examination  Reported they have been eating out regularly recently  Plan  Miralax daily  Sennakot two tablets twice daily    Leucocytosis  Assessment & Plan  · POA WBC mildly elevated 12.74 however patient has remained afebrile, no fevers no chills  · Likely in the setting of urinary calculus however no pyelonephritis  · Plan  · Monitor CBCs    Elevated d-dimer  Assessment & Plan  · Remote history of provoked DVT/PE   · POA noted with mildly elevated D-dimer 1.10  · Exam on admission somewhat lower extremity swelling L>R although there is significant lipedema bilaterally however no tenderness to palpation or discomfort. · VAS duplex US lower extremity: No acute DVT. 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     Hypercalcemia, resolved  Assessment & Plan  · POA calcium mildly elevated 10.6 with associated urolithiasis  · Today calcium levels- normal 8.9  · Was initially treated with IV fluid bolus    · Plan    · Continue IV fluids  · Encourage p.o. intake  · If no improvement consider further work-up for hypercalcemia    Stage 4 chronic kidney disease [facility])  Assessment & Plan  Lab Results   Component Value Date    EGFR 25 08/31/2023    EGFR 28 08/26/2023    EGFR 31 06/28/2023    CREATININE 1.86 (H) 08/31/2023    CREATININE 1.73 (H) 08/26/2023    CREATININE 1.58 (H) 06/28/2023   · Per review patient's baseline seems to be 1.4-1.6  · Likely multifactorial prerenal and postrenal in the setting of poor p.o. 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  · Urinary retention protocol  · I's and O's      Anxiety  Assessment & Plan  · Continue home Cymbalta 60 mg once a day  · Continue home Xanax 0.5 mg twice daily as needed    Hypertensive Urgency  Assessment & Plan  · POA BP noted 180-207/80s however no associated symptoms such as headache, chest pain  · Has not been taking antihypertensive medications over the past 2 to 3 days  · At the ED received 1 dose of labetalol 10 mg.  · Per chart review patient most recently seen by PCP on July 28, 2023 with acceptable blood pressures in the 130s  · Blood Pressure: 136/67         · Plan:  · Continue home Imdur 60 mg once a day  · Continue Cardizem  mg once a day as per pharmacy formulary for Tiazac 300 mg  · Hold home losartan 100 mg per pharmacy formulary for irbesartan in setting of Cr> baseline. Type 2 diabetes mellitus with complication, with long-term current use of insulin [facility])  Assessment & Plan  Lab Results   Component Value Date    HGBA1C 10.4 (H) 2023       Recent Labs     23  0733 23  1044 23  1613 23  2122   POCGLU 281* 359* 172* 147*       Blood Sugar Average: Last 72 hrs:  · History of type 2 diabetes uncontrolled on insulin pump  · Has been recently prescribed Ozempic however has not started it just yet    · Plan  · Patient does not remember insulin carbohydrate ratio at this time and does not have insulin pump currently placed  · Continue carbohydrate controlled diet level 2  · We started her on Lantus 20 units at bedtime as patient has had poor p.o. intake over the past 3 days  · Continue with Humalog 8 units 3 times daily  · Consider endocrinology consult upon discharge for insulin pump settings assistance. VTE Pharmacologic Prophylaxis: VTE Score: 7 Moderate Risk (Score 3-4) - Pharmacological DVT Prophylaxis Ordered: enoxaparin (Lovenox). Patient Centered Rounds: I performed bedside rounds with nursing staff today. Discussions with Specialists or Other Care Team Provider: none    Education and Discussions with Family / Patient: Updated  () via phone. Current Length of Stay: 0 day(s)  Current Patient Status: Observation   Discharge Plan: Anticipate discharge in 24-48 hrs to home. Code Status: Level 1 - Full Code    Subjective: This morning, Ms. Marsh was seen and examined at bedside. Patient was lying on the bed. She reported that she still has 6 out of 10 waxing and waning pain, especially when she is trying to move around in the bed or trying to walk. She also has associated nausea along with the pain. She also endorsed increased urine frequency but no burning or hematuria. She is constipated  .     Objective:     Vitals:   Temp (24hrs), Av.9 °F (36.6 °C), Min:97.7 °F (36.5 °C), Max:98.5 °F (36.9 °C)    Temp:  [97.7 °F (36.5 °C)-98.5 °F (36.9 °C)] 97.7 °F (36.5 °C)  HR:  [70-83] 70  Resp:  [15-17] 17  BP: (128-138)/(67-71) 136/67  SpO2:  [91 %-96 %] 91 %  Body mass index is 39.63 kg/m². Input and Output Summary (last 24 hours): Intake/Output Summary (Last 24 hours) at 9/1/2023 1519  Last data filed at 9/1/2023 0901  Gross per 24 hour   Intake 2281.67 ml   Output 1050 ml   Net 1231.67 ml       Physical Exam:   Physical Exam  Vitals and nursing note reviewed. Constitutional:       General: She is not in acute distress. HENT:      Head: Normocephalic and atraumatic. Nose: Nose normal.      Mouth/Throat:      Mouth: Mucous membranes are moist.      Pharynx: Oropharynx is clear. Cardiovascular:      Rate and Rhythm: Normal rate and regular rhythm. Pulses: Normal pulses. Heart sounds: Normal heart sounds. Pulmonary:      Effort: Pulmonary effort is normal.      Breath sounds: Normal breath sounds. Abdominal:      General: Bowel sounds are normal.      Palpations: Abdomen is soft. Tenderness: There is abdominal tenderness in the right lower quadrant. There is no right CVA tenderness, left CVA tenderness, guarding or rebound. Musculoskeletal:      Cervical back: Neck supple. Right lower leg: No edema. Left lower leg: No edema. Skin:     General: Skin is warm and dry. Neurological:      General: No focal deficit present. Mental Status: She is alert and oriented to person, place, and time.           Additional Data:     Labs:  Results from last 7 days   Lab Units 09/01/23  0921   WBC Thousand/uL 9.46   HEMOGLOBIN g/dL 11.0*   HEMATOCRIT % 34.6*   PLATELETS Thousands/uL 251   NEUTROS PCT % 70   LYMPHS PCT % 22   MONOS PCT % 7   EOS PCT % 0     Results from last 7 days   Lab Units 09/01/23  0921   SODIUM mmol/L 131*   POTASSIUM mmol/L 4.2   CHLORIDE mmol/L 97   CO2 mmol/L 24   BUN mg/dL 28*   CREATININE mg/dL 1.85*   ANION GAP mmol/L 10 CALCIUM mg/dL 8.9   ALBUMIN g/dL 3.8   TOTAL BILIRUBIN mg/dL 0.72   ALK PHOS U/L 90   ALT U/L 13   AST U/L 20   GLUCOSE RANDOM mg/dL 247*         Results from last 7 days   Lab Units 09/01/23  1131 09/01/23  0723 08/31/23  2122 08/31/23  1613 08/31/23  1044 08/31/23  0733   POC GLUCOSE mg/dl 194* 234* 147* 172* 359* 281*               Lines/Drains:  Invasive Devices     Peripheral Intravenous Line  Duration           Peripheral IV 08/31/23 Right;Ventral (anterior) Forearm 1 day                      Imaging: Reviewed radiology reports from this admission including: ultrasound(s)    Recent Cultures (last 7 days):         Last 24 Hours Medication List:   Current Facility-Administered Medications   Medication Dose Route Frequency Provider Last Rate   • acetaminophen  650 mg Oral Q6H PRN Osmin Michel MD     • ALPRAZolam  0.5 mg Oral BID PRN Osmin Michel MD     • aspirin  81 mg Oral Daily Osmin Michel MD     • atorvastatin  40 mg Oral Daily With [address], MD     • diltiazem  300 mg Oral Daily Osmin Michel MD     • DULoxetine  60 mg Oral Daily Osmin Michel MD     • enoxaparin  30 mg Subcutaneous Daily Osmin Michel MD     • HYDROmorphone  0.5 mg Intravenous Q4H PRN Osmin Michel MD     • insulin glargine  20 Units Subcutaneous HS Osmin Michel MD     • insulin lispro  1-5 Units Subcutaneous HS Denny Palafox MD     • insulin lispro  1-6 Units Subcutaneous TID [address] MD     • insulin lispro  8 Units Subcutaneous TID With Meals Osmin Michel MD     • isosorbide mononitrate  60 mg Oral Daily Osmin Michel MD     • labetalol  10 mg Intravenous Q6H PRN Osmin Michel MD     • levothyroxine  50 mcg Oral Early Morning Osmin Michel MD     • multi-electrolyte  500 mL Intravenous Once Osmin Michel MD     • multi-electrolyte  100 mL/hr Intravenous Continuous Wayne Aly  mL/hr (09/01/23 1319)   • ondansetron  4 mg Intravenous Q6H PRN Wayne Aly MD     • oxyCODONE  5 mg Oral Q6H PRN Wayne Aly MD     • oxyCODONE  2.5 mg Oral Q6H PRN Wayne Aly MD     • polyethylene glycol  17 g Oral Daily Cristina Womack MD     • senna-docusate sodium  2 tablet Oral BID Cristina Womack MD     • tamsulosin  0.4 mg Oral Daily With Dave Novak MD          Today, Patient Was Seen By: Cristina Womack MD    **Please Note: This note may have been constructed using a voice recognition system. **

## 2023-09-01 NOTE — UTILIZATION REVIEW
Initial Clinical Review    Admission: Date/Time/Statement:   Admission Orders (From admission, onward)     Ordered        08/31/23 0410  Place in Observation  Once                      Orders Placed This Encounter   Procedures   • Place in Observation     Standing Status:   Standing     Number of Occurrences:   1     Order Specific Question:   Level of Care     Answer:   Med Surg [16]     ED Arrival Information     Expected   -    Arrival   8/31/2023 00:25    Acuity   Urgent            Means of arrival   Ambulance    Escorted by   [facility]    Service   Hospitalist    Admission type   Emergency            Arrival complaint   EMS RUQ pain           Chief Complaint   Patient presents with   • Abdominal Pain     Pt states she has constant, sharp abdominal pain in  bilateral upper quadrant. -N/V/D. Pt was recently discharged from the ER with a kidney stone prescribed Flomax but pt has not been taking it        Initial Presentation: 69 y.o. female  PMH of CKD stage IV, HTN,  uncontrolled DM2 on insulin(recently prescribed Ozempic but hasn't started ), fibromyalgia, chronic pain syndrome, anxiety who presents to ED from home via EMS with 5 to 6-day history of generalized abdominal pain that started approximately on Saturday. Pt originally presented to ED 8/26/23 with  work-up at that time revealing UPJ obstruction, mild right sided hydronephrosis secondary to 3 mm calculi -pt was discharged from the ED with Flomax but did not start it . Cecelia Meyers Pt currently reports abdominal pain  as sharp, intermittent that waxes and wanes at worst 8/10 with somewhat associated dry heaves, poor oral intake, generalized fatigue and weakness. . Pt aslo reports somewhat looser stools and urinary urgency and frequency however no dysuria. On exam, dry mucous membranes , abdminal tenderness, has reducible ventral hernia,tender to palpation epigastric area as well as right upper quadrant and around hernia site. Tenderness to palpation suprapubic . BLE lymphedema L >R. Pt anxious . BP elevated . Labs - WBC 12.74, creat 1.86 from baseline 1.4-1.6, calcium elevated 10.6 , D Dimer 1.10. Pt given IVFbolus, IV analgesic,IV labetalol in ED. Pt admitted as OBS with generalized abdominal pain,R sided nephroureterolithiasis. Hypertensive urgency . Plan - Start Flomax, Check UA. IVF. If no improvement ,obtain renal US. Consider urology consult. Hold home losartan . Strain urine . Monitor creat with BMP in am .. CBC in am .  Continue home Imdur, Cardizem for home Tiazac. Monitor off abx . Start Lantus 20 units at bedtime. Humalog 8 units TID. SSI . Obtain venous duplex BLE . Date:9/1  VAS duplex US lower extremity 8/31 shows no acute DVT. 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 Renal ultrasound 8/31 showed no hydronephrosis  . UA positive for glucose, protein, blood. Pt still has 6 out of 10 waxing and waning pain, especially when she is trying to move around in the bed or trying to walk. Associated nausea along with the pain. She also endorsed increased urine frequency but no burning or hematuria. She is constipated  . On exam, abdomen distended . Miralax and sennkot ordered . Pt continues on IVF, strain urine, continue Flomax . Creat 1.85 today . Calcium level normal today.  BMP in am .     ED Triage Vitals   Temperature Pulse Respirations Blood Pressure SpO2   08/31/23 0034 08/31/23 0031 08/31/23 0031 08/31/23 0031 08/31/23 0031   97.5 °F (36.4 °C) 79 20 (!) 184/87 98 %      Temp Source Heart Rate Source Patient Position - Orthostatic VS BP Location FiO2 (%)   08/31/23 0034 08/31/23 0031 08/31/23 0031 08/31/23 0031 --   Oral Monitor Sitting Left arm       Pain Score       08/31/23 0147       7          Wt Readings from Last 1 Encounters:   08/31/23 89 kg (196 lb 3.4 oz)     Additional Vital Signs:   Date/Time Temp Pulse Resp BP MAP (mmHg) SpO2 O2 Device Patient Position - Orthostatic VS   09/01/23 07:21:45 97.7 °F (36.5 °C) 70 17 136/67 90 91 % -- --   08/31/23 21:24:20 97.8 °F (36.6 °C) 80 17 128/69 89 94 % None (Room air) Lying   08/31/23 19:45:48 97.7 °F (36.5 °C) 83 -- -- -- 94 % -- --   08/31/23 15:57:05 98.5 °F (36.9 °C) 72 15 138/71 93 96 % -- --   08/31/23 1115 -- 95 -- -- 103 -- -- Standing for 3 minutes - Orthostatic VS   08/31/23 1112 -- 89 -- 136/80 99 -- -- Sitting - Orthostatic VS   08/31/23 11:03:19 -- 83 -- 134/77 96 94 % -- Lying - Orthostatic VS   08/31/23 1044 -- -- -- -- -- -- -- Lying - Orthostatic VS   08/31/23 09:56:23 -- 89 -- 180/101 Abnormal   127 94 % -- --   BP: PRN BP med given at 08/31/23 0956   08/31/23 08:44:56 -- 111 Abnormal  -- 187/102 Abnormal  130 96 % -- --   08/31/23 08:41:30 97.9 °F (36.6 °C) 91 -- 187/102 Abnormal  130 96 % -- --   08/31/23 0741 -- -- -- 180/92 Abnormal   -- -- -- Lying   BP: Morning BP meds given. Will recheck in an hour. at 08/31/23 0741   08/31/23 07:38:14 97.9 °F (36.6 °C) 89 17 183/102 Abnormal  129 92 % -- --   08/31/23 05:26:17 98.3 °F (36.8 °C) 85 -- 172/101 Abnormal  125 94 % -- --   08/31/23 0430 -- -- -- 162/77 110 -- -- --   08/31/23 0400 -- 91 18 201/91 Abnormal  131 94 % -- --   08/31/23 0300 -- 96 18 207/94 Abnormal  135 95 % -- --   08/31/23 0200 -- 93 -- 181/93 Abnormal  130 96 % None (Room air)      Pertinent Labs/Diagnostic Test Results:    8/31 ECG- Normal sinus rhythm  Left axis deviation  Anterior infarct , age undetermined  VAS lower limb venous duplex study, complete bilateral   Final Result by Jacqui Castro MD (08/31 1233)   RIGHT LOWER LIMB:  No evidence of acute deep vein thrombosis. Evidence of focal, chronic non-occlusive thrombus noted in the common femoral  vein at the level of the saphenofemoral junction. No evidence of superficial thrombophlebitis noted. Doppler evaluation shows a normal response to augmentation maneuvers. .  Popliteal, posterior tibial and anterior tibial arterial Doppler waveform's are  triphasic.      LEFT LOWER LIMB:  No evidence of acute or chronic deep vein thrombosis. No evidence of superficial thrombophlebitis noted. Doppler evaluation shows a normal response to augmentation maneuvers. Popliteal, posterior tibial and anterior tibial arterial Doppler waveform's are  triphasic.   US kidney and bladder   Final Result by Joellen Crane MD (08/31 1058)        Small echogenic focus with twinkle artifact at the right UVJ, likely corresponding to the stone seen on prior CT. Bilateral ureteral jets detected. No hydronephrosis. Workstation performed: [id number]         XR chest 1 view portable   ED Interpretation by Apple Abarca MD (08/31 0320)   No acute abnormalities      Final Result by Prakash Powell MD (08/31 0830)      No acute cardiopulmonary disease.                   Workstation performed: [id number]               Results from last 7 days   Lab Units 08/31/23  0106 08/26/23  2032   WBC Thousand/uL 12.74* 9.74   HEMOGLOBIN g/dL 13.3 12.3   HEMATOCRIT % 41.3 38.7   PLATELETS Thousands/uL 302 260   NEUTROS ABS Thousands/µL 10.13* 6.87         Results from last 7 days   Lab Units 08/31/23  0106 08/26/23  2210   SODIUM mmol/L 136 134*   POTASSIUM mmol/L 4.5 4.7   CHLORIDE mmol/L 103 102   CO2 mmol/L 19* 22   ANION GAP mmol/L 14 10   BUN mg/dL 24 25   CREATININE mg/dL 1.86* 1.73*   EGFR ml/min/1.73sq m 25 28   CALCIUM mg/dL 10.4* 10.0     Results from last 7 days   Lab Units 08/31/23  0106 08/26/23  2210   AST U/L 27 13   ALT U/L 16 15   ALK PHOS U/L 115* 123*   TOTAL PROTEIN g/dL 8.9* 8.6*   ALBUMIN g/dL 4.5 4.5   TOTAL BILIRUBIN mg/dL 0.62 0.47     Results from last 7 days   Lab Units 09/01/23  0723 08/31/23  2122 08/31/23  1613 08/31/23  1044 08/31/23  0733   POC GLUCOSE mg/dl 234* 147* 172* 359* 281*     Results from last 7 days   Lab Units 08/31/23  0106 08/26/23  2210   GLUCOSE RANDOM mg/dL 96 325*             No results found for: "BETA-HYDROXYBUTYRATE"       Results from last 7 days   Lab Units 08/31/23  0106   PH BERNADETTE  7.497*   PCO2 BERNADETTE mm Hg 23.6*   PO2 BERNADETTE mm Hg 50.3*   HCO3 BERNADETTE mmol/L 17.9*   BASE EXC BERNADETTE mmol/L -3.3   O2 CONTENT BERNADETTE ml/dL 17.4   O2 HGB, VENOUS % 86.0*             Results from last 7 days   Lab Units 08/31/23  0324 08/31/23  0106   HS TNI 0HR ng/L  --  10   HS TNI 2HR ng/L 8  --    HSTNI D2 ng/L -2  --      Results from last 7 days   Lab Units 08/31/23  0106   D-DIMER QUANTITATIVE ug/ml FEU 1.10*           Results from last 7 days   Lab Units 08/31/23  0106 08/26/23  2210   LIPASE u/L 15 31                 Results from last 7 days   Lab Units 08/31/23  1207 08/26/23  2210   CLARITY UA  Clear Clear   COLOR UA  Light Yellow Colorless   SPEC GRAV UA  1.019 1.008   PH UA  5.5 5.5   GLUCOSE UA mg/dl 500 (1/2%)* 1000 (1%)*   KETONES UA mg/dl Trace* Negative   BLOOD UA  Moderate* Small*   PROTEIN UA mg/dl 100 (2+)* 50 (1+)*   NITRITE UA  Negative Negative   BILIRUBIN UA  Negative Negative   UROBILINOGEN UA (BE) mg/dl <2.0 <2.0   LEUKOCYTES UA  Negative Negative   WBC UA /hpf 4-10* 1-2   RBC UA /hpf 1-2 10-20*   BACTERIA UA /hpf None Seen None Seen   EPITHELIAL CELLS WET PREP /hpf Occasional None Seen      Latest Reference Range & Units 09/01/23 09:21   Sodium 135 - 147 mmol/L 131 (L)   Potassium 3.5 - 5.3 mmol/L 4.2   Chloride 96 - 108 mmol/L 97   CO2 21 - 32 mmol/L 24   Anion Gap mmol/L 10   BUN 5 - 25 mg/dL 28 (H)   Creatinine 0.60 - 1.30 mg/dL 1.85 (H)   Glucose, Random 65 - 140 mg/dL 247 (H)   Calcium 8.4 - 10.2 mg/dL 8.9   AST 13 - 39 U/L 20   ALT 7 - 52 U/L 13   Alkaline Phosphatase 34 - 104 U/L 90   Total Protein 6.4 - 8.4 g/dL 7.3   Albumin 3.5 - 5.0 g/dL 3.8   TOTAL BILIRUBIN 0.20 - 1.00 mg/dL 0.72   eGFR ml/min/1.73sq m 26   WBC 4.31 - 10.16 Thousand/uL 9.46   Red Blood Cell Count 3.81 - 5.12 Million/uL 3.99   Hemoglobin 11.5 - 15.4 g/dL 11.0 (L)   HCT 34.8 - 46.1 % 34.6 (L)   MCV 82 - 98 fL 87   MCH 26.8 - 34.3 pg 27.6   MCHC 31.4 - 37.4 g/dL 31.8   RDW 11.6 - 15.1 % 15.8 (H) Platelet Count 820 - 390 Thousands/uL 251   MPV 8.9 - 12.7 fL 10.3   nRBC /100 WBCs 0   Neutrophils % 43 - 75 % 70   Immat GRANS % 0 - 2 % 1   Lymphocytes Relative 14 - 44 % 22   Monocytes Relative 4 - 12 % 7   Eosinophils 0 - 6 % 0   Basophils Relative 0 - 1 % 0   Immature Grans Absolute 0.00 - 0.20 Thousand/uL 0.05   Absolute Neutrophils 1.85 - 7.62 Thousands/µL 6.67   Lymphocytes Absolute 0.60 - 4.47 Thousands/µL 2.03   Absolute Monocytes 0.17 - 1.22 Thousand/µL 0.65   Absolute Eosinophils 0.00 - 0.61 Thousand/µL 0.02   Basophils Absolute 0.00 - 0.10 Thousands/µL 0.04   (L): Data is abnormally low  (H): Data is abnormally high          ED Treatment:   Medication Administration from 08/31/2023 0025 to 08/31/2023 0515       Date/Time Order Dose Route Action     08/31/2023 0300 EDT lactated ringers bolus 500 mL 0 mL Intravenous Stopped     08/31/2023 0147 EDT lactated ringers bolus 500 mL 500 mL Intravenous New Bag     08/31/2023 0147 EDT HYDROmorphone (DILAUDID) injection 0.5 mg 0.5 mg Intravenous Given     08/31/2023 0425 EDT labetalol (NORMODYNE) injection 10 mg 10 mg Intravenous Given        Past Medical History:   Diagnosis Date   • Acute embolism and thrombosis of unspecified deep veins of unspecified lower extremity (HCC)     Last Assessed:  5/18/17   • Anemia    • Anosmia    • Anxiety    • Arthritis    • Asthma    • Back pain    • Bilateral macular retinal edema    • CAD (coronary artery disease)    • Cataract    • Cervical disc herniation    • Cervical radiculopathy    • Cervical spinal stenosis    • Cervical spondylolysis    • Chronic kidney disease    • Chronic mastoiditis    • Colon polyp    • Complex endometrial hyperplasia    • Depression    • Diabetes mellitus (HCC)    • Disease of thyroid gland    • DVT (deep venous thrombosis) (HCC)    • DVT (deep venous thrombosis) ([address]) 6/16/2017   • Fibromyalgia    • Fibromyalgia, primary    • Hyperlipidemia    • Hypertension    • Hypothyroid    • Kidney stone • Lumbar radiculopathy    • Neck pain    • Obese    • PONV (postoperative nausea and vomiting)    • Shingles 07/01/2021   • Spinal stenosis    • Stomach ulcer    • Thyroid disease      Present on Admission:  • Hypertensive Urgency  • Anxiety  • Hypercalcemia      Admitting Diagnosis: Hydronephrosis [N13.30]  Leukocytosis [D72.829]  Abdominal pain [R10.9]  Calculus of ureterovesical junction (UVJ) [N20.1]  Age/Sex: 69 y.o. female  Admission Orders:  Scheduled Medications:  aspirin, 81 mg, Oral, Daily  atorvastatin, 40 mg, Oral, Daily With Dinner  diltiazem, 300 mg, Oral, Daily  DULoxetine, 60 mg, Oral, Daily  enoxaparin, 30 mg, Subcutaneous, Daily  insulin glargine, 20 Units, Subcutaneous, HS  insulin lispro, 1-5 Units, Subcutaneous, HS  insulin lispro, 1-6 Units, Subcutaneous, TID AC  insulin lispro, 8 Units, Subcutaneous, TID With Meals  isosorbide mononitrate, 60 mg, Oral, Daily  levothyroxine, 50 mcg, Oral, Early Morning  multi-electrolyte, 500 mL, Intravenous, Once  tamsulosin, 0.4 mg, Oral, Daily With Dinner      Continuous IV Infusions:  multi-electrolyte, 100 mL/hr, Intravenous, Continuous      PRN Meds:  acetaminophen, 650 mg, Oral, Q6H PRN  ALPRAZolam, 0.5 mg, Oral, BID PRN x1 9/1   HYDROmorphone, 0.5 mg, Intravenous, Q4H PRN  labetalol, 10 mg, Intravenous, Q6H PRN  SBP>180 DBP>110 x1 8/31  ondansetron, 4 mg, Intravenous, Q6H PRN x1 9/1  oxyCODONE, 5 mg, Oral, Q6H PRN x1 9/1  oxyCODONE, 2.5 mg, Oral, Q6H PRN x1 8/31      Level 2 [facility] diet    Network Utilization Review Department  ATTENTION: Please call with any questions or concerns to [phone] and carefully listen to the prompts so that you are directed to the right person. All voicemails are confidential.  [facility] all requests for admission clinical reviews, approved or denied determinations and any other requests to dedicated fax number below belonging to the campus where the patient is receiving treatment.  List of dedicated fax numbers for the Facilities:  FACILITY NAME UR FAX NUMBER   ADMISSION DENIALS (Administrative/Medical Necessity) [phone] [address] (Maternity/NICU/Pediatrics) [phone]   [address] 1000 [facility] [phone] [address] [place] [address] [address] [address] [facility] [address] [phone]

## 2023-09-01 NOTE — ASSESSMENT & PLAN NOTE
· POA calcium mildly elevated 10.6 with associated urolithiasis  · Today calcium levels- normal 8.9  · Was initially treated with IV fluid bolus    · Plan    · Continue IV fluids  · Encourage p.o. intake  · If no improvement consider further work-up for hypercalcemia

## 2023-09-01 NOTE — ASSESSMENT & PLAN NOTE
· POA BP noted 180-207/80s however no associated symptoms such as headache, chest pain  · Has not been taking antihypertensive medications over the past 2 to 3 days  · At the ED received 1 dose of labetalol 10 mg.  · Per chart review patient most recently seen by PCP on July 28, 2023 with acceptable blood pressures in the 130s  · Blood Pressure: 136/67         · Plan:  · Continue home Imdur 60 mg once a day  · Continue Cardizem  mg once a day as per pharmacy formulary for Tiazac 300 mg  · Hold home losartan 100 mg per pharmacy formulary for irbesartan in setting of Cr> baseline.

## 2023-09-01 NOTE — DISCHARGE SUMMARY
[facility]  Discharge- Shante Person 1945, 69 y.o. female MRN: [id number]  Unit/Bed#: W -01 Encounter: [id number]  Primary Care Provider: Maurisio Barrera DO   Date and time admitted to hospital: 8/31/2023 12:26 AM    * Right sided abdominal pain, secondary to ureterolithiasis  Assessment & Plan  · POA 5 to 6 days history of sharp generalized abdominal pain that waxes and wanes worsening 7/10 without associated nausea or vomiting however poor p.o. intake. Patient also endorsed suprapubic abdominal pain with associated urgency however no dysuria or frequency. Patient was just seen at the ED on 08/26/2023 for the same was diagnosed with right hydroureter related to 3 mm calculus for which was subsequently discharged on Flomax however patient never started medications. · At the ED on 08/26/2023 CT abdominal pelvis did showed "mild right hydronephrosis and right hydroureter related to a 3 mm calculus in the right ureterovesical junction" and subsequently discharged from the ED with Flomax however patient never started. · Generalized abdominal pain likely due to right UPJ obstruction versus anterior abdominal hernia given tenderness upon palpation around the site however this is not incarcerated at this time. · Renal ultrasound 8/31 showed no hydronephrosis   · UA positive for glucose, protein, blood  · 9/2/23: patient reports resolution of abdominal pain. No stone yet appreciated in strained urine. Plan:  · Continue Flomax 0.4 mg once a day         Constipation  Assessment & Plan  9/2/2023: patient reports bowel movement yesterday morning.   Reports abdominal pain is resolved, 0 out of 10 this morning  Would consider possible IBS contributing to presentation for abdominal pain, in addition to nephrolith  IBS likely exacerbated by patient's ongoing anxiety and depression    Plan  Continue Miralax daily until having regular bowel movements  Continue Sennakot two tablets twice daily till having regular bowel movements  Recommend starting Metamucil once regular bowel movements have resumed      Anxiety  Assessment & Plan  · Continue home Cymbalta 60 mg once a day  · Continue home Xanax 0.5 mg twice daily as needed  · Recommend follow-up with primary care physician and psychiatrist, as patient is experiencing increased anxiety and depression, which is likely exacerbating her symptoms, including of irritable bowel syndrome  · Offered referrals to psychotherapy, but patient declined    Type 2 diabetes mellitus with complication, with long-term current use of insulin [facility])  Assessment & Plan  Lab Results   Component Value Date    HGBA1C 10.4 (H) 06/13/2023       Recent Labs     09/01/23  2059 09/02/23  0720 09/02/23  1054 09/02/23  1621   POCGLU 168* 143* 214* 131       Blood Sugar Average: Last 72 hrs:  · History of type 2 diabetes uncontrolled on insulin pump  · Has been recently prescribed Ozempic however has not started it just yet    Plan  · Resume outpatient diabetic regimen at discharge  · Recommend follow-up with outpatient endocrinologist    Stage 4 chronic kidney disease [facility])  Assessment & Plan  Lab Results   Component Value Date    EGFR 30 09/02/2023    EGFR 26 09/01/2023    EGFR 25 08/31/2023    CREATININE 1.64 (H) 09/02/2023    CREATININE 1.85 (H) 09/01/2023    CREATININE 1.86 (H) 08/31/2023   · Per review patient's baseline seems to be 1.4-1.6  · Likely multifactorial prerenal and postrenal in the setting of poor p.o. intake with associated post renal due to UPJ obstruction  · Improved with gentle IV fluids  · Encouraged p.o. fluid intake at home      Medical Problems     Resolved Problems  Date Reviewed: 8/31/2023          Resolved    Hypertensive Urgency 9/2/2023     Resolved by  Kristan Sue MD    Hypercalcemia, resolved 9/2/2023     Resolved by  Kristan Sue MD    Elevated d-dimer 9/2/2023     Resolved by  Kristan Sue MD    Leucocytosis 9/2/2023 Resolved by  Spencer Cox MD        Discharging Resident: Spencer Cox MD  Discharging Attending: No att. providers found  PCP: Isamar Panchal DO  Admission Date:   Admission Orders (From admission, onward)     Ordered        09/02/23 0810  Inpatient Admission  Once            08/31/23 0410  Place in Observation  Once                      Discharge Date: 09/02/23    Consultations During Hospital Stay:  · none    Procedures Performed:   · none    Significant Findings / Test Results:   Renal ultrasound 8/31-   IMPRESSION:     · Small echogenic focus with twinkle artifact at the right UVJ, likely corresponding to the stone seen on prior CT. Bilateral ureteral jets detected. Lower extremity venous doppler 8/31  Impression:     RIGHT LOWER LIMB:  No evidence of acute deep vein thrombosis. Evidence of focal, chronic non-occlusive thrombus noted in the common femoral  vein at the level of the saphenofemoral junction. No evidence of superficial thrombophlebitis noted. Doppler evaluation shows a normal response to augmentation maneuvers. .  Popliteal, posterior tibial and anterior tibial arterial Doppler waveform's are  triphasic. LEFT LOWER LIMB:  No evidence of acute or chronic deep vein thrombosis. No evidence of superficial thrombophlebitis noted. Doppler evaluation shows a normal response to augmentation maneuvers. Popliteal, posterior tibial and anterior tibial arterial Doppler waveform's are  triphasic. Incidental Findings:   · None    Test Results Pending at Discharge (will require follow up): · None     Outpatient Tests Requested:  · None    Complications: None    Reason for Admission: Nephrolith associated with abdominal pain    Hospital Course: Donavan Singh is a 69 y.o. female patient who originally presented to the hospital on 8/31/2023 due to 5 to 6 days of right sided abdominal pain.   Initially presented to the hospital for the same last week, and was found to have 3 mm calculi causing ureteropelvic junction obstruction with mild right-sided hydronephrosis. Patient was subsequently discharged from the ED with Flomax and recommendation of oral hydration. However the patient did not  Buy Flomax from the pharmacy as she thought it was a medication for benign prostatic hypertrophy. Patient continued to have  Waxing and wanning right sided abdominal pain, after which she decided to present to the hospital again. In the ED patient was noted to have a high systolic blood pressure in the range of 1 80-200s. She was given a dose of IV labetalol after which her blood pressure returned to normal levels. She was also noted to have leukocytosis of 12.74, hypercalcemia 10.6 and increased creatinine levels of 1.86 from her baseline of 1.4-1.6. Iline Jessica Ultrasound of the abdomen and abdomen was done that showed findings consistent with the right sided ureterolithiasis. Patient was started on Flomax and IV hydration and urine was  strained . Her creatinine and calcium levels levels returned back to baseline after IV hydration. Patient reported resolution of abdominal pain on 9/2/2023 and is eager for discharge to home. At the time of discharge patient is stable, and is being discharged on tamsulosin, MiraLAX, Senokot. Recommend outpatient follow-up with patient's PCP, endocrinologist, and psychiatrist.    Please see above list of diagnoses and related plan for additional information. Condition at Discharge: stable    Discharge Day Visit / Exam:   Subjective: This morning, Ms. Marsh is seen lying up in bed, awake, alert, engaged with exam, in no acute distress. She reports poor sleep overnight due to bad dreams otherwise has no complaints at this time. Reports resolution of abdominal pain, with 0 out of 10 in severity. Reports bowel movement yesterday morning. Denies new or worsening symptoms and remainder of brief review of systems is negative.     Vitals: Blood Pressure: (!) 132/47 (09/02/23 1532)  Pulse: 64 (09/02/23 1532)  Temperature: 97.9 °F (36.6 °C) (09/02/23 1532)  Temp Source: Oral (09/01/23 1613)  Respirations: 18 (09/02/23 1532)  Weight - Scale: 92.2 kg (203 lb 3.2 oz) (09/02/23 0600)  SpO2: 96 % (09/02/23 1532)  Exam:   Physical Exam  Vitals and nursing note reviewed. Constitutional:       General: She is not in acute distress. Appearance: Normal appearance. She is obese. She is not ill-appearing, toxic-appearing or diaphoretic. HENT:      Head: Normocephalic and atraumatic. Cardiovascular:      Rate and Rhythm: Normal rate and regular rhythm. Pulses: Normal pulses. Heart sounds: Normal heart sounds. No murmur heard. No friction rub. No gallop. Pulmonary:      Effort: Pulmonary effort is normal. No respiratory distress. Breath sounds: Normal breath sounds. No stridor. No wheezing, rhonchi or rales. Chest:      Chest wall: No tenderness. Abdominal:      General: Bowel sounds are normal. There is no distension. Palpations: Abdomen is soft. Tenderness: There is no abdominal tenderness. There is no guarding or rebound. Musculoskeletal:      Right lower leg: No edema. Left lower leg: No edema. Skin:     General: Skin is warm and dry. Coloration: Skin is not jaundiced or pale. Neurological:      Mental Status: She is alert. Discussion with Family: Updated  () at bedside. Discharge instructions/Information to patient and family:   See after visit summary for information provided to patient and family. Provisions for Follow-Up Care:  See after visit summary for information related to follow-up care and any pertinent home health orders. Disposition:   Home    Planned Readmission: None    Discharge Medications:  See after visit summary for reconciled discharge medications provided to patient and/or family.       **Please Note: This note may have been constructed using a voice recognition system**

## 2023-09-01 NOTE — ASSESSMENT & PLAN NOTE
Lab Results   Component Value Date    EGFR 25 08/31/2023    EGFR 28 08/26/2023    EGFR 31 06/28/2023    CREATININE 1.86 (H) 08/31/2023    CREATININE 1.73 (H) 08/26/2023    CREATININE 1.58 (H) 06/28/2023   · Per review patient's baseline seems to be 1.4-1.6  · Likely multifactorial prerenal and postrenal in the setting of poor p.o. 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  · Urinary retention protocol  · I's and O's

## 2023-09-01 NOTE — ASSESSMENT & PLAN NOTE
· POA WBC mildly elevated 12.74 however patient has remained afebrile, no fevers no chills  · Likely in the setting of urinary calculus however no pyelonephritis  · Plan  · Monitor CBCs

## 2023-09-02 VITALS
BODY MASS INDEX: 41.04 KG/M2 | WEIGHT: 203.2 LBS | TEMPERATURE: 97.9 F | OXYGEN SATURATION: 96 % | RESPIRATION RATE: 18 BRPM | DIASTOLIC BLOOD PRESSURE: 47 MMHG | SYSTOLIC BLOOD PRESSURE: 132 MMHG | HEART RATE: 64 BPM

## 2023-09-02 PROBLEM — I10 ESSENTIAL HYPERTENSION: Chronic | Status: RESOLVED | Noted: 2017-04-19 | Resolved: 2023-09-02

## 2023-09-02 PROBLEM — E83.52 HYPERCALCEMIA: Status: RESOLVED | Noted: 2023-08-31 | Resolved: 2023-09-02

## 2023-09-02 PROBLEM — D72.829 LEUCOCYTOSIS: Status: RESOLVED | Noted: 2023-08-31 | Resolved: 2023-09-02

## 2023-09-02 PROBLEM — R79.89 ELEVATED D-DIMER: Status: RESOLVED | Noted: 2023-08-31 | Resolved: 2023-09-02

## 2023-09-02 LAB
ANION GAP SERPL CALCULATED.3IONS-SCNC: 11 MMOL/L
BUN SERPL-MCNC: 26 MG/DL (ref 5–25)
CALCIUM SERPL-MCNC: 9.2 MG/DL (ref 8.4–10.2)
CHLORIDE SERPL-SCNC: 101 MMOL/L (ref 96–108)
CO2 SERPL-SCNC: 27 MMOL/L (ref 21–32)
CREAT SERPL-MCNC: 1.64 MG/DL (ref 0.6–1.3)
GFR SERPL CREATININE-BSD FRML MDRD: 30 ML/MIN/1.73SQ M
GLUCOSE P FAST SERPL-MCNC: 154 MG/DL (ref 65–99)
GLUCOSE SERPL-MCNC: 131 MG/DL (ref 65–140)
GLUCOSE SERPL-MCNC: 143 MG/DL (ref 65–140)
GLUCOSE SERPL-MCNC: 154 MG/DL (ref 65–140)
GLUCOSE SERPL-MCNC: 214 MG/DL (ref 65–140)
POTASSIUM SERPL-SCNC: 4.2 MMOL/L (ref 3.5–5.3)
SODIUM SERPL-SCNC: 139 MMOL/L (ref 135–147)

## 2023-09-02 PROCEDURE — 80048 BASIC METABOLIC PNL TOTAL CA: CPT

## 2023-09-02 PROCEDURE — 99239 HOSP IP/OBS DSCHRG MGMT >30: CPT | Performed by: INTERNAL MEDICINE

## 2023-09-02 PROCEDURE — 93005 ELECTROCARDIOGRAM TRACING: CPT

## 2023-09-02 PROCEDURE — 82948 REAGENT STRIP/BLOOD GLUCOSE: CPT

## 2023-09-02 RX ORDER — POLYETHYLENE GLYCOL 3350 17 G/17G
17 POWDER, FOR SOLUTION ORAL DAILY
Qty: 238 G | Refills: 0 | Status: SHIPPED | OUTPATIENT
Start: 2023-09-03 | End: 2023-09-25

## 2023-09-02 RX ORDER — AMOXICILLIN 250 MG
2 CAPSULE ORAL 2 TIMES DAILY
Qty: 120 TABLET | Refills: 0 | Status: SHIPPED | OUTPATIENT
Start: 2023-09-02 | End: 2023-09-25

## 2023-09-02 RX ADMIN — DILTIAZEM HYDROCHLORIDE 300 MG: 180 CAPSULE, COATED, EXTENDED RELEASE ORAL at 09:11

## 2023-09-02 RX ADMIN — DULOXETINE HYDROCHLORIDE 60 MG: 60 CAPSULE, DELAYED RELEASE ORAL at 09:11

## 2023-09-02 RX ADMIN — INSULIN LISPRO 8 UNITS: 100 INJECTION, SOLUTION INTRAVENOUS; SUBCUTANEOUS at 11:49

## 2023-09-02 RX ADMIN — ASPIRIN 81 MG: 81 TABLET, COATED ORAL at 09:11

## 2023-09-02 RX ADMIN — ATORVASTATIN CALCIUM 40 MG: 40 TABLET, FILM COATED ORAL at 17:05

## 2023-09-02 RX ADMIN — SENNOSIDES AND DOCUSATE SODIUM 2 TABLET: 50; 8.6 TABLET ORAL at 17:05

## 2023-09-02 RX ADMIN — SENNOSIDES AND DOCUSATE SODIUM 2 TABLET: 50; 8.6 TABLET ORAL at 09:10

## 2023-09-02 RX ADMIN — ENOXAPARIN SODIUM 30 MG: 30 INJECTION SUBCUTANEOUS at 09:10

## 2023-09-02 RX ADMIN — ALPRAZOLAM 0.5 MG: 0.5 TABLET ORAL at 14:40

## 2023-09-02 RX ADMIN — INSULIN LISPRO 8 UNITS: 100 INJECTION, SOLUTION INTRAVENOUS; SUBCUTANEOUS at 17:05

## 2023-09-02 RX ADMIN — INSULIN LISPRO 2 UNITS: 100 INJECTION, SOLUTION INTRAVENOUS; SUBCUTANEOUS at 11:49

## 2023-09-02 RX ADMIN — ISOSORBIDE MONONITRATE 60 MG: 60 TABLET, EXTENDED RELEASE ORAL at 09:11

## 2023-09-02 RX ADMIN — TAMSULOSIN HYDROCHLORIDE 0.4 MG: 0.4 CAPSULE ORAL at 17:05

## 2023-09-02 RX ADMIN — LEVOTHYROXINE SODIUM 50 MCG: 50 TABLET ORAL at 05:16

## 2023-09-02 NOTE — ASSESSMENT & PLAN NOTE
Lab Results   Component Value Date    HGBA1C 10.4 (H) 06/13/2023       Recent Labs     09/01/23  2059 09/02/23  0720 09/02/23  1054 09/02/23  1621   POCGLU 168* 143* 214* 131       Blood Sugar Average: Last 72 hrs:  · History of type 2 diabetes uncontrolled on insulin pump  · Has been recently prescribed Ozempic however has not started it just yet    Plan  · Resume outpatient diabetic regimen at discharge  · Recommend follow-up with outpatient endocrinologist

## 2023-09-02 NOTE — ASSESSMENT & PLAN NOTE
· Continue home Cymbalta 60 mg once a day  · Continue home Xanax 0.5 mg twice daily as needed  · Recommend follow-up with primary care physician and psychiatrist, as patient is experiencing increased anxiety and depression, which is likely exacerbating her symptoms, including of irritable bowel syndrome  · Offered referrals to psychotherapy, but patient declined

## 2023-09-02 NOTE — ASSESSMENT & PLAN NOTE
9/2/2023: patient reports bowel movement yesterday morning.   Reports abdominal pain is resolved, 0 out of 10 this morning  Would consider possible IBS contributing to presentation for abdominal pain, in addition to nephrolith  IBS likely exacerbated by patient's ongoing anxiety and depression    Plan  Continue Miralax daily until having regular bowel movements  Continue Sennakot two tablets twice daily till having regular bowel movements  Recommend starting Metamucil once regular bowel movements have resumed

## 2023-09-02 NOTE — UTILIZATION REVIEW
Date: 9/2/23     Day 3: Has surpassed a 2nd midnight with active treatments and services, which include labs (daily BMP), accuchecks with SSI, IVF, pain control . See initial review completed by  Lucinda Sawyer  on 9/1/23 . Initial inpatient  Review  Admission: Date/Time/Statement:  8/31/23 0410 Observation AND CHANGED 9/2/23 0810 TO INPATIENT AS PATIENT NEEDS > 2 MIDNIGHT STAY WITH ONGOING IVF, PAIN CONTROL FOR ABDOMINAL PAIN. Orders Placed This Encounter   Procedures   • Inpatient Admission     Standing Status:   Standing     Number of Occurrences:   1     Order Specific Question:   Level of Care     Answer:   Med Surg [16]     Order Specific Question:   Estimated length of stay     Answer:   More than 2 Midnights     Order Specific Question:   Certification     Answer:   I certify that inpatient services are medically necessary for this patient for a duration of greater than two midnights. See H&P and MD Progress Notes for additional information about the patient's course of treatment. Admission Orders (From admission, onward)     Ordered        09/02/23 0810  Inpatient Admission  Once                      Date:  9/2/23                         Current Patient Class:  INPATIENT   Current Level of Care: med surg     HPI:77 y.o. female initially admitted on 8/31/23 to [address] 9/2/23 due to abdominal pain and suspect due to known nephro ureterolithiasis/Right sided nephroureterolithiasis/CKD with acute renal insuffiencey/Hypertension with hypertensive urgency/DM2. Presented due to abdominal pain starting 5 to 6 days prior to arrival with poor intake. At the ED on 08/26/2023 CT abdominal pelvis did showed "mild right hydronephrosis and right hydroureter related to a 3 mm calculus in the right ureterovesical junction. Initial -375 systolic. Creatinine 1.86 with baseline of 1.4 - 1.6. In June hgb a1c 10.4 on insulin pump but has not yet started Ozempic.    Plan of starting Flomax, IVF, pain control  Home Imdur, losartan and Cardizem Cd continued. Insulin pump off and started Lantus and Humalog. Accuchecks with SSI.     9/1/23 Observation:   Still with abdominal pain and suspect due to nephro ureterolithiasis, rule out constipation vs IBS. Concerned about abdominal distention. Patient feels bloated. Admits to constipation and diarrhea at times in past.  anxious. On exam: hyperresonance on percussion over the ventral hernia but abdomen is otherwise normal in terms of percussion elsewhere. Continue pain control, flomax and IVF . Bowel regimen with senna/Docusate and Miralax. Continue IVF, BMP in am.    Hold home Losartan. Continue other antihypertensive. Continue Lantus, Humalog and SSI. Assessment/Plan:   9/2/23 CHANGED TO INPATIENT:   Still no BM.  + flatus. On exam:  " abdomen is soft and nondistended but obese. She does have a ventral hernia with has some hyperresonance on percussion. The hernia is reducible. Even with deeper palpation the patient has no tenderness on exam.  No guarding or rebound. The heart is with a regular rate and rhythm with normal S1 and S2 heart sounds. The lungs are slightly decreased but otherwise clear without any wheezing, rhonchi, or rales. Extremities are larger in size but there is no obvious edema. No calf tenderness or varicosities. Patient is anxious at times and near tearful at times during our conversation especially when talking about social situation and her struggles with depression. No focal neurological deficits are noted though." encourage po intake. IVF in progress. Xanax as needed.      Vital Signs:   09/02/23 07:23:16 97.8 °F (36.6 °C) 61 17 133/46 Abnormal  75 95 % -- --   09/01/23 21:00:54 98.1 °F (36.7 °C) 67 -- 144/60 88 97 % -- Lying   09/01/23 16:13:29 97.8 °F (36.6 °C) 63 -- 137/58 84 97 % None (Room air) Sitting   09/01/23 07:21:45 97.7 °F (36.5 °C) 70 17 136/67 90 91 % -- --   08/31/23 21:24:20 97.8 °F (36.6 °C) 80 17 128/69 89 94 % None (Room air) Lying   08/31/23 19:45:48 97.7 °F (36.5 °C) 83 -- -- -- 94 % -- --   08/31/23 15:57:05 98.5 °F (36.9 °C) 72 15 138/71 93 96 % -- --   08/31/23 1115 -- 95 -- -- 103 -- -- Standing for 3 minutes - Orthostatic VS   08/31/23 1112 -- 89 -- 136/80 99 -- -- Sitting - Orthostatic VS   08/31/23 11:03:19 -- 83 -- 134/77 96 94 % -- Lying - Orthostatic VS   08/31/23 1044 -- -- -- -- -- -- -- Lying - Orthostatic VS   08/31/23 09:56:23 -- 89 -- 180/101 Abnormal   127 94 % -- --   BP: PRN BP med given at 08/31/23 3272     Pertinent Labs/Diagnostic Results:   VAS lower limb venous duplex study, complete bilateral   Final Result by Jose Genao MD (08/31 1233)      US kidney and bladder   Final Result by Anup Mcnulty MD (08/31 1058)      Small echogenic focus with twinkle artifact at the right UVJ, likely corresponding to the stone seen on prior CT. Bilateral ureteral jets detected. No hydronephrosis. Workstation performed: [id number]         XR chest 1 view portable   ED Interpretation by Delmi Duque MD (08/31 0320)   No acute abnormalities      Final Result by Hi Carter MD (08/31 0830)      No acute cardiopulmonary disease.                   Workstation performed: [id number]           Results from last 7 days   Lab Units 09/01/23  0921 08/31/23  0106 08/26/23  2032   WBC Thousand/uL 9.46 12.74* 9.74   HEMOGLOBIN g/dL 11.0* 13.3 12.3   HEMATOCRIT % 34.6* 41.3 38.7   PLATELETS Thousands/uL 251 302 260   NEUTROS ABS Thousands/µL 6.67 10.13* 6.87     Results from last 7 days   Lab Units 09/02/23  0547 09/01/23  0921 08/31/23  0106 08/26/23  2210   SODIUM mmol/L 139 131* 136 134*   POTASSIUM mmol/L 4.2 4.2 4.5 4.7   CHLORIDE mmol/L 101 97 103 102   CO2 mmol/L 27 24 19* 22   ANION GAP mmol/L 11 10 14 10   BUN mg/dL 26* 28* 24 25   CREATININE mg/dL 1.64* 1.85* 1.86* 1.73*   EGFR ml/min/1.73sq m 30 26 25 28   CALCIUM mg/dL 9.2 8.9 10.4* 10.0 Results from last 7 days   Lab Units 09/01/23  0921 08/31/23  0106 08/26/23  2210   AST U/L 20 27 13   ALT U/L 13 16 15   ALK PHOS U/L 90 115* 123*   TOTAL PROTEIN g/dL 7.3 8.9* 8.6*   ALBUMIN g/dL 3.8 4.5 4.5   TOTAL BILIRUBIN mg/dL 0.72 0.62 0.47     Results from last 7 days   Lab Units 09/02/23  0720 09/01/23  2059 09/01/23  1611 09/01/23  1131 09/01/23  0723 08/31/23  2122 08/31/23  1613 08/31/23  1044 08/31/23  0733   POC GLUCOSE mg/dl 143* 168* 111 194* 234* 147* 172* 359* 281*     Results from last 7 days   Lab Units 09/02/23  0547 09/01/23  0921 08/31/23  0106 08/26/23  2210   GLUCOSE RANDOM mg/dL 154* 247* 96 325*     Results from last 7 days   Lab Units 08/31/23  0106   PH BERNADETTE  7.497*   PCO2 BERNADETTE mm Hg 23.6*   PO2 BERNADETTE mm Hg 50.3*   HCO3 BERNADETTE mmol/L 17.9*   BASE EXC BERNADETTE mmol/L -3.3   O2 CONTENT BERNADETTE ml/dL 17.4   O2 HGB, VENOUS % 86.0*     Results from last 7 days   Lab Units 08/31/23  0324 08/31/23  0106   HS TNI 0HR ng/L  --  10   HS TNI 2HR ng/L 8  --    HSTNI D2 ng/L -2  --      Results from last 7 days   Lab Units 08/31/23  0106   D-DIMER QUANTITATIVE ug/ml FEU 1.10*     Results from last 7 days   Lab Units 08/31/23  0106 08/26/23  2210   LIPASE u/L 15 31     Results from last 7 days   Lab Units 08/31/23  1207 08/26/23  2210   CLARITY UA  Clear Clear   COLOR UA  Light Yellow Colorless   SPEC GRAV UA  1.019 1.008   PH UA  5.5 5.5   GLUCOSE UA mg/dl 500 (1/2%)* 1000 (1%)*   KETONES UA mg/dl Trace* Negative   BLOOD UA  Moderate* Small*   PROTEIN UA mg/dl 100 (2+)* 50 (1+)*   NITRITE UA  Negative Negative   BILIRUBIN UA  Negative Negative   UROBILINOGEN UA (BE) mg/dl <2.0 <2.0   LEUKOCYTES UA  Negative Negative   WBC UA /hpf 4-10* 1-2   RBC UA /hpf 1-2 10-20*   BACTERIA UA /hpf None Seen None Seen   EPITHELIAL CELLS WET PREP /hpf Occasional None Seen     Medications:   Scheduled Medications:  aspirin, 81 mg, Oral, Daily  atorvastatin, 40 mg, Oral, Daily With Dinner  diltiazem, 300 mg, Oral, Daily  DULoxetine, 60 mg, Oral, Daily  enoxaparin, 30 mg, Subcutaneous, Daily  insulin glargine, 20 Units, Subcutaneous, HS  insulin lispro, 1-5 Units, Subcutaneous, HS  insulin lispro, 1-6 Units, Subcutaneous, TID AC  insulin lispro, 8 Units, Subcutaneous, TID With Meals  isosorbide mononitrate, 60 mg, Oral, Daily  levothyroxine, 50 mcg, Oral, Early Morning  multi-electrolyte, 500 mL, Intravenous, Once  polyethylene glycol, 17 g, Oral, Daily  senna-docusate sodium, 2 tablet, Oral, BID  tamsulosin, 0.4 mg, Oral, Daily With Dinner      Continuous IV Infusions:  multi-electrolyte, 100 mL/hr, Intravenous, Continuous      PRN Meds:  acetaminophen, 650 mg, Oral, Q6H PRN  ALPRAZolam, 0.5 mg, Oral, BID PRN X 2 9/1  HYDROmorphone, 0.5 mg, Intravenous, Q4H PRN  labetalol, 10 mg, Intravenous, Q6H PRN X 1 8/31  ondansetron, 4 mg, Intravenous, Q6H PRN  oxyCODONE, 5 mg, Oral, Q6H PRN X 1 9/1  oxyCODONE, 2.5 mg, Oral, Q6H PRN X 1 8/31    Strain all urine   Fingerstick glucose 4 times daily before meals and at bedtime       Discharge Plan: To be determined. Network Utilization Review Department  ATTENTION: Please call with any questions or concerns to [phone] and carefully listen to the prompts so that you are directed to the right person. All voicemails are confidential.  Josefina Debbie all requests for admission clinical reviews, approved or denied determinations and any other requests to dedicated fax number below belonging to the campus where the patient is receiving treatment.  List of dedicated fax numbers for the Facilities:  [place] DENIALS (Administrative/Medical Necessity) [phone] [address] [facility] (Maternity/NICU/Pediatrics) [phone]   [address] [phone]   [address] [phone]   [address] [place] [address] [address] [address] [facility] [address] [phone]

## 2023-09-02 NOTE — ASSESSMENT & PLAN NOTE
· POA 5 to 6 days history of sharp generalized abdominal pain that waxes and wanes worsening 7/10 without associated nausea or vomiting however poor p.o. intake. Patient also endorsed suprapubic abdominal pain with associated urgency however no dysuria or frequency. Patient was just seen at the ED on 08/26/2023 for the same was diagnosed with right hydroureter related to 3 mm calculus for which was subsequently discharged on Flomax however patient never started medications. · At the ED on 08/26/2023 CT abdominal pelvis did showed "mild right hydronephrosis and right hydroureter related to a 3 mm calculus in the right ureterovesical junction" and subsequently discharged from the ED with Flomax however patient never started. · Generalized abdominal pain likely due to right UPJ obstruction versus anterior abdominal hernia given tenderness upon palpation around the site however this is not incarcerated at this time. · Renal ultrasound 8/31 showed no hydronephrosis   · UA positive for glucose, protein, blood  · 9/2/23: patient reports resolution of abdominal pain. No stone yet appreciated in strained urine.     Plan:  · Continue Flomax 0.4 mg once a day

## 2023-09-02 NOTE — ASSESSMENT & PLAN NOTE
Lab Results   Component Value Date    EGFR 30 09/02/2023    EGFR 26 09/01/2023    EGFR 25 08/31/2023    CREATININE 1.64 (H) 09/02/2023    CREATININE 1.85 (H) 09/01/2023    CREATININE 1.86 (H) 08/31/2023   · Per review patient's baseline seems to be 1.4-1.6  · Likely multifactorial prerenal and postrenal in the setting of poor p.o. intake with associated post renal due to UPJ obstruction  · Improved with gentle IV fluids  · Encouraged p.o. fluid intake at home

## 2023-09-05 ENCOUNTER — TRANSITIONAL CARE MANAGEMENT (OUTPATIENT)
Dept: INTERNAL MEDICINE CLINIC | Facility: CLINIC | Age: 78
End: 2023-09-05

## 2023-09-05 ENCOUNTER — OFFICE VISIT (OUTPATIENT)
Dept: INTERNAL MEDICINE CLINIC | Facility: CLINIC | Age: 78
End: 2023-09-05
Payer: COMMERCIAL

## 2023-09-05 VITALS
WEIGHT: 193 LBS | TEMPERATURE: 98.5 F | OXYGEN SATURATION: 99 % | SYSTOLIC BLOOD PRESSURE: 198 MMHG | HEART RATE: 101 BPM | BODY MASS INDEX: 38.98 KG/M2 | DIASTOLIC BLOOD PRESSURE: 102 MMHG

## 2023-09-05 DIAGNOSIS — I10 PRIMARY HYPERTENSION: Primary | ICD-10-CM

## 2023-09-05 LAB
ATRIAL RATE: 74 BPM
P AXIS: 87 DEGREES
PR INTERVAL: 180 MS
QRS AXIS: -23 DEGREES
QRSD INTERVAL: 86 MS
QT INTERVAL: 412 MS
QTC INTERVAL: 457 MS
T WAVE AXIS: 23 DEGREES
VENTRICULAR RATE: 74 BPM

## 2023-09-05 PROCEDURE — 99495 TRANSJ CARE MGMT MOD F2F 14D: CPT | Performed by: GENERAL ACUTE CARE HOSPITAL

## 2023-09-05 PROCEDURE — 93010 ELECTROCARDIOGRAM REPORT: CPT | Performed by: INTERNAL MEDICINE

## 2023-09-05 RX ORDER — LABETALOL 100 MG/1
100 TABLET, FILM COATED ORAL 2 TIMES DAILY
Qty: 4 TABLET | Refills: 0 | Status: SHIPPED | OUTPATIENT
Start: 2023-09-05 | End: 2023-09-07

## 2023-09-05 NOTE — ASSESSMENT & PLAN NOTE
-known case of primary htn  -bp readings of 198/100 three times in the clinic in both  arms    PLAN  -Pt advised to continue her current regimen  -Labetalol 100 mg twice added to the regimen due to high bp readings  -pt advised to go to emergency room if the readings are still high after medication and experiences any symptoms such as headache or chest pain

## 2023-09-05 NOTE — PROGRESS NOTES
Name: Lakisha Gamez      :       MRN: [id number]  Encounter Provider: Dianna Álvarez MD  Encounter Date: 2023   Encounter department: [facility] [address]. Primary hypertension  Assessment & Plan:  -known case of primary htn  -bp readings of 198/100 three times in the clinic in both  arms    PLAN  -Pt advised to continue her current regimen  -Labetalol 100 mg twice added to the regimen due to high bp readings  -pt advised to go to emergency room if the readings are still high after medication and experiences any symptoms such as headache or chest pain     Orders:  -     labetalol (NORMODYNE) 100 mg tablet; Take 1 tablet (100 mg total) by mouth 2 (two) times a day for 2 days Only take the medication when is over 180/90. Subjective      HPI 69 yr old female presents to clinic for a follow up following hospitalization. Pt was hospitalized due to abdominal pain and kidney stone diagnosis was confirmed. Pt was discharged on Tamsulosin for the management. Pt's bp readings have been high post hospital visit. Pt is compliant with her treatment, denies any headache, chest pain or blurring of vision. Pt also complains of feeling more anxious than usual post hospital visit. Review of Systems   Constitutional: Negative. HENT: Negative. Eyes: Negative. Respiratory: Negative. Cardiovascular: Positive for leg swelling. Gastrointestinal: Negative. Endocrine: Negative. Genitourinary: Negative. Musculoskeletal: Negative. Skin: Negative. Neurological: Negative. Hematological: Negative. Psychiatric/Behavioral: Negative. Current Outpatient Medications on File Prior to Visit   Medication Sig   • ALPRAZolam (XANAX) 0.5 mg tablet TAKE 1 TABLET (0.5 MG TOTAL) BY MOUTH 2 (TWO) TIMES A DAY AS NEEDED FOR ANXIETY.    • aspirin 81 MG tablet Take 81 mg by mouth daily   • B-D UF III MINI PEN NEEDLES 31G X 5 MM MISC    • BAQSIMI TWO PACK 3 MG/DOSE POWD Use as directed    • BD Insulin Syringe U/F 31G X 5/16" 0.5 ML MISC 4 (four) times a day As directed   • cholecalciferol (VITAMIN D3) 1,000 units tablet Take 2,000 Units by mouth daily   • Coenzyme Q10 (Co Q-10) 200 MG CAPS Take by mouth in the morning   • Continuous Blood Gluc Sensor (Dexcom G6 Sensor) MISC Use 1 each   • diltiazem (TIAZAC) 300 MG 24 hr capsule Take 300 mg by mouth daily   • Docusate Sodium (DSS) 100 MG CAPS Take 1 capsule by mouth every 12 (twelve) hours   • DULoxetine (CYMBALTA) 60 mg delayed release capsule TAKE 1 CAPSULE BY MOUTH EVERY DAY   • fluocinonide (LIDEX) 0.05 % external solution    • Insulin Disposable Pump (OmniPod Dash 5 Pack Pods) MISC USE 1 POD EVERY 1.5 DAYS   • irbesartan (AVAPRO) 300 mg tablet Take 300 mg by mouth   • isosorbide mononitrate (IMDUR) 60 mg 24 hr tablet TAKE 1.5 TABs AT NIGHT   • levothyroxine 50 mcg tablet Take 50 mcg by mouth daily   • NovoLOG 100 UNIT/ML SOLN INFUSE UNDER THE SKIN VIA INSULIN PUMP AS DIRECTED. TOTAL DAILY DOSE  UNITS   • rosuvastatin (CRESTOR) 20 MG tablet Take 20 mg by mouth every evening     • senna-docusate sodium (SENOKOT S) 8.6-50 mg per tablet Take 2 tablets by mouth 2 (two) times a day until having regular bowel movements, then stop. • tamsulosin (FLOMAX) 0.4 mg Take 1 capsule (0.4 mg total) by mouth daily with dinner Do not start before August 27, 2023. • diclofenac sodium (VOLTAREN) 1 % Apply 2 g topically 4 (four) times a day (Patient not taking: Reported on 9/5/2023)   • naloxone (NARCAN) 4 mg/0.1 mL nasal spray Administer 1 spray into a nostril. If no response after 2-3 minutes, give another dose in the other nostril using a new spray. (Patient not taking: Reported on 9/5/2023)   • nitroglycerin (NITROLINGUAL) 0.4 mg/spray spray USE ONE SPRAY Q 5 MINUTES AS NEEDED FOR CHEST PAIN. IF NO RELIEF, CALL 911.  (Patient not taking: Reported on 8/31/2023)   • polyethylene glycol (MIRALAX) 17 g packet Take 17 g by mouth daily for 14 days Continue taking until having regular bowel movements, then stop. Do not start before September 3, 2023. (Patient not taking: Reported on 9/5/2023)       Objective     BP (!) 198/102   Pulse 101   Temp 98.5 °F (36.9 °C) (Tympanic)   Wt 87.5 kg (193 lb)   SpO2 99%   BMI 38.98 kg/m²      Physical Exam  Constitutional:       General: She is in acute distress. HENT:      Head: Normocephalic and atraumatic. Right Ear: External ear normal.      Left Ear: External ear normal.      Nose: Nose normal.      Mouth/Throat:      Mouth: Mucous membranes are moist.      Pharynx: Oropharynx is clear. Eyes:      Conjunctiva/sclera: Conjunctivae normal.      Pupils: Pupils are equal, round, and reactive to light. Cardiovascular:      Rate and Rhythm: Normal rate and regular rhythm. Pulses: Normal pulses. no weak pulses          Dorsalis pedis pulses are 2+ on the right side and 2+ on the left side. Posterior tibial pulses are 2+ on the right side and 2+ on the left side. Heart sounds: Normal heart sounds. Pulmonary:      Effort: Pulmonary effort is normal.      Breath sounds: Normal breath sounds. Abdominal:      General: Bowel sounds are normal.      Palpations: Abdomen is soft. Musculoskeletal:         General: Normal range of motion. Cervical back: Normal range of motion. Feet:      Right foot:      Skin integrity: Dry skin present. No ulcer, skin breakdown, erythema, warmth or callus. Left foot:      Skin integrity: Dry skin present. No ulcer, skin breakdown, erythema, warmth or callus. Skin:     General: Skin is warm. Capillary Refill: Capillary refill takes less than 2 seconds. Neurological:      General: No focal deficit present. Mental Status: She is alert and oriented to person, place, and time.        TCM Call     Date and time call was made  9/5/2023 10:58 AM    Hospital care reviewed  Records not available    Patient was hospitialized at  [address] [place] [address]    Date of Admission  08/31/23    Date of discharge  09/02/23    Diagnosis  Right sided abdominal pain, secondary to ureterolithiasis    Disposition  Home    Were the patients medications reviewed and updated  No      TCM Call     Post hospital issues  None    Should patient be enrolled in anticoag monitoring? No    Scheduled for follow up? Yes    Did you obtain your prescribed medications  Yes    Do you need help managing your prescriptions or medications  No    Is transportation to your appointment needed  No    I have advised the patient to call PCP with any new or worsening symptoms  Tony Hussein MR/MA        Mary Carmen Tsang, FANTASMAiabetic Foot Exam    Patient's shoes and socks removed. Right Foot/Ankle   Right Foot Inspection  Skin Exam: skin normal, skin intact and dry skin. No warmth, no callus, no erythema, no maceration, no abnormal color, no pre-ulcer, no ulcer and no callus. Toe Exam: ROM and strength within normal limits and swelling. Sensory   Vibration: intact  Proprioception: intact  Monofilament testing: intact    Vascular  Capillary refills: < 3 seconds  The right DP pulse is 2+. The right PT pulse is 2+. Left Foot/Ankle  Left Foot Inspection  Skin Exam: skin normal, skin intact and dry skin. No warmth, no erythema, no maceration, normal color, no pre-ulcer, no ulcer and no callus. Toe Exam: ROM and strength within normal limits and swelling. Sensory   Vibration: intact  Proprioception: intact  Monofilament testing: intact    Vascular  Capillary refills: < 3 seconds  The left DP pulse is 2+. The left PT pulse is 2+.      Assign Risk Category  No deformity present  No loss of protective sensation  No weak pulses  Risk: 0

## 2023-09-05 NOTE — ASSESSMENT & PLAN NOTE
-known case of anxiety for the past few years   -Pt was recently hospitalised where her medication was stopped  -Pt currently feeling more anxious than usual    PLAN  -continue her previous plan for anxiety  -pt advised to take a extra dose of xanax if her symptoms persists  -Pt has a follow up appointment in 2 days with  where further management will be discussed

## 2023-09-05 NOTE — PATIENT INSTRUCTIONS
Take a extra dose xanax for anxiety  Measure blood pressure at home and keep a record of it   Take Labetalol if the blood pressure is above 180/90   If the blood pressure is still elevated after taking the medication (labetalol), and experience symptoms such as headache, chest pain, please go to the emergency room

## 2023-09-07 ENCOUNTER — TELEPHONE (OUTPATIENT)
Dept: INTERNAL MEDICINE CLINIC | Facility: CLINIC | Age: 78
End: 2023-09-07

## 2023-09-12 ENCOUNTER — SOCIAL WORK (OUTPATIENT)
Dept: BEHAVIORAL/MENTAL HEALTH CLINIC | Facility: CLINIC | Age: 78
End: 2023-09-12
Payer: COMMERCIAL

## 2023-09-12 DIAGNOSIS — F41.9 ANXIETY: Primary | ICD-10-CM

## 2023-09-12 PROCEDURE — 90834 PSYTX W PT 45 MINUTES: CPT | Performed by: SOCIAL WORKER

## 2023-09-12 NOTE — PSYCH
Behavioral Health Psychotherapy Progress Note    Psychotherapy Provided: Individual Psychotherapy     1. Anxiety            Goals addressed in session: Goal 1     DATA: María Elena Gresham has been experiencing an increase in stress and anxiety secondary to recent BP and blood sugar issues as well as multiple family stressors. Recent hospitalization did nit illicit much support from spouse and family. Managing the stress of all of this the best she can. It has been affecting her sleep and she has had consistent issues falling asleep. Once she falls asleep she can stay asleep. Not happy with current endocrinologist- will forward recommendations from my PCP's to explore. Will also make PCP aware of these issues. Denies any acte depressive symptoms. Concerned as she does not want blood sugar issues to return. Aware of my departure next month. During this session, this clinician used the following therapeutic modalities: Solution-Focused Therapy and Supportive Psychotherapy    Substance Abuse was addressed during this session. If the client is diagnosed with a co-occurring substance use disorder, please indicate any changes in the frequency or amount of use: none. Stage of change for addressing substance use diagnoses: No substance use/Not applicable    ASSESSMENT:  Lakisha Gamez presents with a Anxious mood. her affect is Normal range and intensity and Constricted, which is congruent, with her mood and the content of the session. The client has not made progress on their goals. Lakisha Gamez presents with a minimal risk of suicide, minimal risk of self-harm, and minimal risk of harm to others. For any risk assessment that surpasses a "low" rating, a safety plan must be developed. A safety plan was indicated: no  If yes, describe in detail n/a    PLAN: Between sessions, Lakisha Gamez will continue to utilize stress mgmt/reduction strategies reviewed during previous sessions.  Will forward endocrinologist recommendations to her. Will discuss sleep issues with PCP. Any Mares At the next session, the therapist will use Solution-Focused Therapy and Supportive Psychotherapy to address stress/anxiety. Behavioral Health Treatment Plan and Discharge Planning: Racquel De Los Santos is aware of and agrees to continue to work on their treatment plan. They have identified and are working toward their discharge goals.  no    Visit start and stop times: 2:00-2:40    09/12/23

## 2023-09-14 ENCOUNTER — OFFICE VISIT (OUTPATIENT)
Dept: NEUROSURGERY | Facility: CLINIC | Age: 78
End: 2023-09-14
Payer: COMMERCIAL

## 2023-09-14 VITALS
OXYGEN SATURATION: 98 % | HEIGHT: 59 IN | BODY MASS INDEX: 38.91 KG/M2 | RESPIRATION RATE: 16 BRPM | TEMPERATURE: 97.5 F | HEART RATE: 60 BPM | WEIGHT: 193 LBS | SYSTOLIC BLOOD PRESSURE: 138 MMHG | DIASTOLIC BLOOD PRESSURE: 62 MMHG

## 2023-09-14 DIAGNOSIS — M54.50 LOWER BACK PAIN: ICD-10-CM

## 2023-09-14 DIAGNOSIS — M54.2 NECK PAIN: Primary | ICD-10-CM

## 2023-09-14 PROCEDURE — 99213 OFFICE O/P EST LOW 20 MIN: CPT | Performed by: PHYSICIAN ASSISTANT

## 2023-09-14 RX ORDER — INSULIN GLARGINE 300 U/ML
30 INJECTION, SOLUTION SUBCUTANEOUS DAILY
COMMUNITY
Start: 2023-09-12

## 2023-09-14 NOTE — PROGRESS NOTES
Office Note - Neurosurgery   Karla Anderson 69 y.o. female MRN: [id number]      Assessment:    Patient is a 69years old pleasant woman with past medical history of diabetes mellitus with A1c 10.3%, hypothyroidism, SARAY, DMT2 polyneuropathy, restrictive lung disease, coronary artery disease, PVCs, osteoarthritis, hypertension, status post CABG here today for follow-up of lower back pain patient in the setting of degenerative disease. Patient reports history of neck pain and also right upper remedy numbness and occasional weakness including  and opening a jar. Reports history of low back pain is localized without radiculopathy to the lower extremities, numbness, weakness or paresthesia. No bowel/bladder dysfunction or saddle anesthesia. Denies any gait issues. Patient tried physical therapy in the past without much improvement, she  also saw a pain management in the past but no recent SUZANNE. MRI of cervical spine done on 8/21/2023 demonstrates multilevel cervical spondylosis with some degenerative changes more prominent at C4-5 and right C5-6 neural foraminal narrowing finding is stable compared to CT done 6/20/ 2019. Exam-patient is alert and oriented x3. Moves all extremities. Strength is 5/5 bilaterally. Sensation to light intact throughout. DTR 2+ without clonus bilaterally. No sign of myelopathy. Slight  tenderness on posterior lower lumbar spine palpation. Hx, PEx, and images reviewed with the patient. Management plan discussed. Dr Neal Downey reviewed the images and patient's history and exam findings and recommended no surgery at this time. Recommend conservative management is pain management follow-up and also physical therapy. We will also referral to pain management order placed. Patient to call office if her symptoms get worse especially with radicular symptoms or myelopathy. Advised fall precaution, avoid lifting heavy objects, excessive bending or twisting.   All questions and concerns were answered to patient satisfaction. Patient expresses understanding's and agreed with the plan        Plan:  1. Ambulatory referral to pain management  2. Offered referral to physical therapy, but patient declined  3. Continue walking exercise, swimming, avoid lifting heavy objects, excessive bending or twisting  4. Follow-up with neurosurgery on apparent basis  5. Call with question or concern      Subjective/Objective     Chief Complaint    "Here to go over the results of her cervical spine images"    HPI  All patient's medical histories were reviewed and updated as appropriate: Allergies, current medication list, past medical history, past surgical history, family history, social history, and current medical lists    Patient is here today to go over the results of her cervical spine MRI. Image findings as described in the assessment section above. Patient reports chronic neck pain, and sometimes right for numbness and weakness including  and opening jar. No gait issues or bowel/bladder dysfunction. Patient has also localized lower back pain without radiculopathy. No weakness in the extremities, numbness or paresthesia. Denies any bowel/bladder issues or saddle anesthesia. She tried injections in the past and also physical therapy without much long-term effect. No fever, chills, rigors, cough or chest pain. ROS  Review of systems are reviewed and updated as follows  Review of Systems   Constitutional: Negative. HENT: Negative. Eyes: Negative. Respiratory: Negative. Cardiovascular: Negative. Gastrointestinal: Positive for diarrhea (occasionally). Endocrine: Negative. Genitourinary: Positive for urgency. Musculoskeletal: Positive for back pain (lumbar and shoulder area), gait problem (unsteady and limited due to pain- 1 recent fall) and neck pain. rates pian today 7/10 LBP some right hand pain w weakness and numbness    Skin: Negative. Allergic/Immunologic: Negative. Neurological: Positive for dizziness (due to fall last week), weakness (generalized) and light-headedness (due to fall last week). Negative for tremors, seizures, numbness and headaches. Hematological: Negative. Psychiatric/Behavioral: Positive for sleep disturbance. Family History    Family History   Problem Relation Age of Onset   • Arthritis Mother    • Leukemia Mother    • Other Mother         Anxiety, major depressive disorder, recurrent episode with atypical features   • Coronary artery disease Father         Heart problem   • Diabetes Father    • Other Father         Infectious disease   • Alzheimer's disease Maternal Grandmother    • Other Maternal Grandfather         Heart problem   • Other Daughter         Anxiety, major depressive disorder, recurrent episode with atypical features   • Alcohol abuse Other         Grandparent   • Cancer Family    • Diabetes Family    • Hypertension Family    • Other Family         Reported prior back trouble, thyroid disorder       Social History    Social History     Socioeconomic History   • Marital status: /Civil Union     Spouse name: Not on file   • Number of children: 2   • Years of education: High school or GED   • Highest education level: Not on file   Occupational History   • Occupation: Retired   Tobacco Use   • Smoking status: Former     Packs/day: 1.00     Years: 6.00     Total pack years: 6.00     Types: Cigarettes     Quit date:      Years since quittin.7   • Smokeless tobacco: Never   • Tobacco comments:     Smoked about a half a pack for about 4 yrs. Quite about 50 yrs ago.    Vaping Use   • Vaping Use: Never used   Substance and Sexual Activity   • Alcohol use: No   • Drug use: No   • Sexual activity: Not Currently     Comment: No known STD risk factors   Other Topics Concern   • Not on file   Social History Narrative    Lives independently with spouse    No known risk factors    Denied: Exercising regularly     Social Determinants of Health     Financial Resource Strain: Low Risk  (12/9/2022)    Overall Financial Resource Strain (CARDIA)    • Difficulty of Paying Living Expenses: Not hard at all   Food Insecurity: Not on file   Transportation Needs: No Transportation Needs (12/9/2022)    PRAPARE - Transportation    • Lack of Transportation (Medical): No    • Lack of Transportation (Non-Medical):  No   Physical Activity: Not on file   Stress: Not on file   Social Connections: Not on file   Intimate Partner Violence: Not on file   Housing Stability: Not on file       Past Medical History    Past Medical History:   Diagnosis Date   • Acute embolism and thrombosis of unspecified deep veins of unspecified lower extremity ([address])     Last Assessed:  5/18/17   • Anemia    • Anosmia    • Anxiety    • Arthritis    • Asthma    • Back pain    • Bilateral macular retinal edema    • CAD (coronary artery disease)    • Cataract    • Cervical disc herniation    • Cervical radiculopathy    • Cervical spinal stenosis    • Cervical spondylolysis    • Chronic kidney disease    • Chronic mastoiditis    • Colon polyp    • Complex endometrial hyperplasia    • Depression    • Diabetes mellitus (HCC)    • Disease of thyroid gland    • DVT (deep venous thrombosis) ([facility])    • DVT (deep venous thrombosis) ([address]) 6/16/2017   • Fibromyalgia    • Fibromyalgia, primary    • Hyperlipidemia    • Hypertension    • Hypothyroid    • Kidney stone    • Lumbar radiculopathy    • Neck pain    • Obese    • PONV (postoperative nausea and vomiting)    • Shingles 07/01/2021   • Spinal stenosis    • Stomach ulcer    • Thyroid disease        Surgical History    Past Surgical History:   Procedure Laterality Date   • BACK SURGERY     • CARPAL TUNNEL RELEASE     • CATARACT EXTRACTION     • CHOLECYSTECTOMY     • COLONOSCOPY     • CORONARY ANGIOPLASTY     • CORONARY ARTERY BYPASS GRAFT     • CYSTOSCOPY N/A 6/20/2017    Procedure: CYSTOSCOPY;  Surgeon: Ghualm Khoury MD;  Location: [facility];  Service: Gynecology Oncology   • CYSTOSCOPY     • CT LAPS TOTAL HYSTERECT 250 GM/< W/RMVL TUBE/OVARY N/A 6/20/2017    Procedure: ROBOTIC HYSTERECTOMY; BILATERAL SALPINO-OOPHERECTOMY; umbilical hernia repair.;  Surgeon: Ghulam Khoury MD;  [facility];  Service: Gynecology Oncology   • CT REPAIR FIRST ABDOMINAL WALL HERNIA N/A 5/12/2022    Procedure: REPAIR HERNIA INCISIONAL;  Surgeon: Roberto Caicedo DO;  Location[facility];  Service: General   • TONSILECTOMY AND ADNOIDECTOMY     • TONSILLECTOMY     • UMBILICAL HERNIA REPAIR         Medications      Current Outpatient Medications:   •  ALPRAZolam (XANAX) 0.5 mg tablet, TAKE 1 TABLET (0.5 MG TOTAL) BY MOUTH 2 (TWO) TIMES A DAY AS NEEDED FOR ANXIETY., Disp: 60 tablet, Rfl: 0  •  aspirin 81 MG tablet, Take 81 mg by mouth daily, Disp: , Rfl:   •  B-D UF III MINI PEN NEEDLES 31G X 5 MM MISC, , Disp: , Rfl:   •  BAQSIMI TWO PACK 3 MG/DOSE POWD, Use as directed , Disp: , Rfl: 0  •  BD Insulin Syringe U/F 31G X 5/16" 0.5 ML MISC, 4 (four) times a day As directed, Disp: , Rfl:   •  cholecalciferol (VITAMIN D3) 1,000 units tablet, Take 2,000 Units by mouth daily, Disp: , Rfl:   •  Coenzyme Q10 (Co Q-10) 200 MG CAPS, Take by mouth in the morning, Disp: , Rfl:   •  Continuous Blood Gluc Sensor (Dexcom G6 Sensor) MISC, Use 1 each, Disp: , Rfl:   •  diclofenac sodium (VOLTAREN) 1 %, Apply 2 g topically 4 (four) times a day (Patient not taking: Reported on 9/5/2023), Disp: 1 Tube, Rfl: 0  •  diltiazem (TIAZAC) 300 MG 24 hr capsule, Take 300 mg by mouth daily, Disp: , Rfl:   •  Docusate Sodium (DSS) 100 MG CAPS, Take 1 capsule by mouth every 12 (twelve) hours, Disp: , Rfl:   •  DULoxetine (CYMBALTA) 60 mg delayed release capsule, TAKE 1 CAPSULE BY MOUTH EVERY DAY, Disp: 90 capsule, Rfl: 3  •  fluocinonide (LIDEX) 0.05 % external solution, , Disp: , Rfl:   •  Insulin Disposable Pump (OmniPod Dash 5 Pack Pods) MISC, USE 1 POD EVERY 1.5 DAYS, Disp: , Rfl:   •  irbesartan (AVAPRO) 300 mg tablet, Take 300 mg by mouth, Disp: , Rfl:   •  isosorbide mononitrate (IMDUR) 60 mg 24 hr tablet, TAKE 1.5 TABs AT NIGHT, Disp: , Rfl:   •  labetalol (NORMODYNE) 100 mg tablet, Take 1 tablet (100 mg total) by mouth 2 (two) times a day for 2 days Only take the medication when is over 180/90., Disp: 4 tablet, Rfl: 0  •  levothyroxine 50 mcg tablet, Take 50 mcg by mouth daily, Disp: , Rfl:   •  naloxone (NARCAN) 4 mg/0.1 mL nasal spray, Administer 1 spray into a nostril. If no response after 2-3 minutes, give another dose in the other nostril using a new spray. (Patient not taking: Reported on 9/5/2023), Disp: 1 each, Rfl: 1  •  nitroglycerin (NITROLINGUAL) 0.4 mg/spray spray, USE ONE SPRAY Q 5 MINUTES AS NEEDED FOR CHEST PAIN. IF NO RELIEF, CALL 911. (Patient not taking: Reported on 8/31/2023), Disp: , Rfl: 1  •  NovoLOG 100 UNIT/ML SOLN, INFUSE UNDER THE SKIN VIA INSULIN PUMP AS DIRECTED. TOTAL DAILY DOSE  UNITS, Disp: , Rfl:   •  polyethylene glycol (MIRALAX) 17 g packet, Take 17 g by mouth daily for 14 days Continue taking until having regular bowel movements, then stop. Do not start before September 3, 2023.  (Patient not taking: Reported on 9/5/2023), Disp: 238 g, Rfl: 0  •  rosuvastatin (CRESTOR) 20 MG tablet, Take 20 mg by mouth every evening  , Disp: , Rfl: 2  •  senna-docusate sodium (SENOKOT S) 8.6-50 mg per tablet, Take 2 tablets by mouth 2 (two) times a day until having regular bowel movements, then stop., Disp: 120 tablet, Rfl: 0  •  tamsulosin (FLOMAX) 0.4 mg, Take 1 capsule (0.4 mg total) by mouth daily with dinner Do not start before August 27, 2023., Disp: 9 capsule, Rfl: 0    Allergies    Allergies   Allergen Reactions   • Molds & Smuts Allergic Rhinitis   • Other Allergic Rhinitis     RAGWEED, CAT DANDER, DOG DANDER    • Pollen Extract Other (See Comments)     Cold symptoms         The following portions of the patient's history were reviewed and updated as appropriate: allergies, current medications, past family history, past medical history, past social history, past surgical history and problem list.    Investigations    I personally reviewed the MRI results with the patient:        Physical Exam    There were no vitals filed for this visit. Physical Exam  Constitutional:       Appearance: She is obese. HENT:      Head: Normocephalic and atraumatic. Eyes:      Extraocular Movements: Extraocular movements intact. Cardiovascular:      Rate and Rhythm: Normal rate. Pulses: Normal pulses. Pulmonary:      Effort: Pulmonary effort is normal.   Musculoskeletal:         General: Tenderness present. Cervical back: Rigidity present. Neurological:      General: No focal deficit present. GCS: GCS eye subscore is 4. GCS verbal subscore is 5. GCS motor subscore is 6. Cranial Nerves: Cranial nerves 2-12 are intact. Sensory: Sensation is intact. Motor: Motor function is intact. Deep Tendon Reflexes: Reflexes are normal and symmetric. Reflex Scores:       Tricep reflexes are 2+ on the right side and 2+ on the left side. Bicep reflexes are 2+ on the right side and 2+ on the left side. Brachioradialis reflexes are 2+ on the right side and 2+ on the left side. Patellar reflexes are 2+ on the right side and 2+ on the left side. Achilles reflexes are 2+ on the right side and 2+ on the left side. Psychiatric:         Speech: Speech normal.       Neurologic Exam     Mental Status   Speech: speech is normal   Level of consciousness: alert    Cranial Nerves   Cranial nerves II through XII intact.      CN III, IV, VI   Nystagmus: none     CN XI   CN XI normal.     Motor Exam   Muscle bulk: normal  Overall muscle tone: normal  Right arm tone: normal  Left arm tone: normal  Right arm pronator drift: absent  Left arm pronator drift: absent  Right leg tone: normal  Left leg tone: normal    Sensory Exam   Light touch normal.     Gait, Coordination, and Reflexes     Reflexes   Right brachioradialis: 2+  Left brachioradialis: 2+  Right biceps: 2+  Left biceps: 2+  Right triceps: 2+  Left triceps: 2+  Right patellar: 2+  Left patellar: 2+  Right achilles: 2+  Left achilles: 2+  Right : 2+  Left : 2+  Right Mi: absent  Left Mi: absent  Right ankle clonus: absent  Left pendular knee jerk: absent

## 2023-09-25 ENCOUNTER — OFFICE VISIT (OUTPATIENT)
Dept: INTERNAL MEDICINE CLINIC | Facility: CLINIC | Age: 78
End: 2023-09-25
Payer: COMMERCIAL

## 2023-09-25 ENCOUNTER — CLINICAL SUPPORT (OUTPATIENT)
Dept: INTERNAL MEDICINE CLINIC | Facility: CLINIC | Age: 78
End: 2023-09-25
Payer: COMMERCIAL

## 2023-09-25 VITALS
OXYGEN SATURATION: 98 % | DIASTOLIC BLOOD PRESSURE: 74 MMHG | WEIGHT: 193.4 LBS | SYSTOLIC BLOOD PRESSURE: 134 MMHG | HEART RATE: 72 BPM | BODY MASS INDEX: 38.99 KG/M2 | RESPIRATION RATE: 16 BRPM | HEIGHT: 59 IN

## 2023-09-25 DIAGNOSIS — E66.01 CLASS 3 SEVERE OBESITY DUE TO EXCESS CALORIES WITHOUT SERIOUS COMORBIDITY IN ADULT, UNSPECIFIED BMI (HCC): ICD-10-CM

## 2023-09-25 DIAGNOSIS — N20.0 KIDNEY STONE: ICD-10-CM

## 2023-09-25 DIAGNOSIS — E11.8 TYPE 2 DIABETES MELLITUS WITH COMPLICATION, WITH LONG-TERM CURRENT USE OF INSULIN (HCC): Primary | Chronic | ICD-10-CM

## 2023-09-25 DIAGNOSIS — Z79.4 TYPE 2 DIABETES MELLITUS WITH COMPLICATION, WITH LONG-TERM CURRENT USE OF INSULIN (HCC): Primary | Chronic | ICD-10-CM

## 2023-09-25 DIAGNOSIS — Z79.4 TYPE 2 DIABETES MELLITUS WITH COMPLICATION, WITH LONG-TERM CURRENT USE OF INSULIN (HCC): Primary | ICD-10-CM

## 2023-09-25 DIAGNOSIS — N18.4 STAGE 4 CHRONIC KIDNEY DISEASE (HCC): ICD-10-CM

## 2023-09-25 DIAGNOSIS — E11.8 TYPE 2 DIABETES MELLITUS WITH COMPLICATION, WITH LONG-TERM CURRENT USE OF INSULIN (HCC): Primary | ICD-10-CM

## 2023-09-25 DIAGNOSIS — F41.9 ANXIETY: ICD-10-CM

## 2023-09-25 LAB — SL AMB POCT HEMOGLOBIN AIC: 9.2 (ref ?–6.5)

## 2023-09-25 PROCEDURE — 83036 HEMOGLOBIN GLYCOSYLATED A1C: CPT | Performed by: PHARMACIST

## 2023-09-25 PROCEDURE — 99214 OFFICE O/P EST MOD 30 MIN: CPT | Performed by: INTERNAL MEDICINE

## 2023-09-25 RX ORDER — VENLAFAXINE HYDROCHLORIDE 37.5 MG/1
37.5 CAPSULE, EXTENDED RELEASE ORAL
Qty: 30 CAPSULE | Refills: 5 | Status: SHIPPED | OUTPATIENT
Start: 2023-09-25

## 2023-09-25 RX ORDER — DULOXETIN HYDROCHLORIDE 30 MG/1
30 CAPSULE, DELAYED RELEASE ORAL DAILY
Qty: 30 CAPSULE | Refills: 1 | Status: SHIPPED | OUTPATIENT
Start: 2023-09-25

## 2023-09-25 NOTE — PROGRESS NOTES
[facility], Pharmacist       ASSESSMENT/PLAN                                                                                     1. Type 2 diabetes mellitus with complication, with long-term current use of insulin [facility])  Assessment & Plan:    Lab Results   Component Value Date    HGBA1C 9.2 (A) 09/25/2023   Most recent A1c above goal but improving. Reported fasting blood sugar elevated, no other readings to review. Would benefit from restarting cgm. Medications: continue as prescribed by endo. Home Monitoring: dexcom g6 placed during appointment; informed patient that if she continues to have issues with cgm, patient should call Dex[employer] and obtain replacement transmitter and sensor as it could be sensor issue. If patient will not be continuing with OmniPod, reviewed converting to Dexcom G7. Will await endo appointment  Patient aware to contact endo appointment to schedule follow-up     Orders:  -     POCT hemoglobin A1c      Care gap message sent for eye exam    Follow-Up: 10/12, will combine with medical resident      SUBJECTIVE                                                                                                                Medication Adherence: Medication list reviewed with patient, reports the following discrepancies/problems:  Dexcom G6: brought in for placement. Tried to place in 8/2023 but had issues and hasn't used since. Received replacement sensor from Dexcom but did not get replacement transmitter. NovoLOG: 10 units with food - usually only eats 2 meals/day. [place] Sopn: has increased to 34 units per endo    Endocrinologist will be [facility]OO for next 2 weeks, uncertain next appointment as she does not have anything scheduled. Did have OOP cost for OmniPod, uncertain if she will be conitnuing. Had diabetes eye exam completed within the past 12 months, Dr. Meri Mason in [facility]; has appointment scheduled next week.     2. Medication Efficacy:    Home Monitoring:  CGM: Yes, Brand: Dexcom G6, brought in for placement    Hypoglycemia history: denies signs/symptoms today, usually feels signs/symptoms when hypo episode starts. 3. Lifestyle: usually only eats 2 meals per day. Today had cheerios/milk, half banana for breakfast (blood sugar was 173 prior to eating). Had some peach cup (sugar free) and applesauce (regular); today had 2 large levon cracker pieces and 1 applesauce cup (regular with added sugar)      OBJECTIVE                                                                                                      Eye Exam:    Lab Results   Component Value Date    LEFTDIABRET None 09/23/2020    RIGHTDIABRET None 09/23/2020       Lab Results   Component Value Date    SODIUM 139 09/02/2023    K 4.2 09/02/2023    EGFR 30 09/02/2023    CREATININE 1.64 (H) 09/02/2023    GLUF 154 (H) 09/02/2023    KIPWXMSR23 262 09/29/2016    MICROALBCRE 117.5 05/10/2023     Lab Results   Component Value Date    HGBA1C 9.2 (A) 09/25/2023    HGBA1C 10.4 (H) 06/13/2023    HGBA1C 8.6 (H) 09/02/2022       Pharmacist Tracking Tool  Reason For Outreach: Embedded Pharmacist  Demographics:  Intervention Method:  In Person  Type of Intervention: New  Topics Addressed: Diabetes  Pharmacologic Interventions: N/A  Non-Pharmacologic Interventions: Care coordination, Labs, Care Gap and Personal CGM  Time:  Direct Patient Care: 35 mins   Care Coordination: 15 mins  Recommendation Recipient: Patient/Caregiver  Outcome: Accepted

## 2023-09-25 NOTE — ASSESSMENT & PLAN NOTE
Lab Results   Component Value Date    HGBA1C 9.2 (A) 09/25/2023   Most recent A1c above goal but improving. Reported fasting blood sugar elevated, no other readings to review. Would benefit from restarting cgm. • Medications: continue as prescribed by endo. • Home Monitoring: dexcom g6 placed during appointment; informed patient that if she continues to have issues with cgm, patient should call Dexcom and obtain replacement transmitter and sensor as it could be sensor issue.   o If patient will not be continuing with OmniPod, reviewed converting to [address].  Will await endo appointment  • Patient aware to contact endo appointment to schedule follow-up No care due was identified.  [facility] Embedded Care Due Messages. Reference number: [id number].   9/21/2023 7:45:47 PM CDT

## 2023-09-25 NOTE — ASSESSMENT & PLAN NOTE
Lab Results   Component Value Date    HGBA1C 9.2 (A) 09/25/2023   Recent hospitalization for hypoglycemia severe likely secondary to p.o. intake and the sensor not working on her monitor currently she is now on basal bolus treatment with long-acting along with short acting insulin under the care of her endocrinologist.  She does not report any further hypoglycemia.   She is increased her caloric intake I will have her meet with clinical pharmacist [facility] along with follow-up with her endocrinologist she will need help with the sensor

## 2023-09-25 NOTE — PATIENT INSTRUCTIONS
Type 2 Diabetes Management for Adults   AMBULATORY CARE:   Type 2 diabetes  is a disease that affects how your body uses glucose (sugar). Either your body cannot make enough insulin, or it cannot use the insulin correctly. It is important to keep diabetes controlled to prevent damage to your heart, blood vessels, and other organs. Management will help you feel well and enjoy your daily activities. Your diabetes care team providers can help you make a plan to fit diabetes care into your schedule. Your plan can change over time to fit your needs and your family's needs. Have someone call your local emergency number (911 in the [address]) if:   • You cannot be woken. • You have signs of diabetic ketoacidosis:     ? confusion, fatigue    ? vomiting    ? rapid heartbeat    ? fruity smelling breath    ? extreme thirst    ? dry mouth and skin    • You have any of the following signs of a heart attack:      ? Squeezing, pressure, or pain in your chest    ? You may  also have any of the following:     - Discomfort or pain in your back, neck, jaw, stomach, or arm    - Shortness of breath    - Nausea or vomiting    - Lightheadedness or a sudden cold sweat    • You have any of the following signs of a stroke:      ? Numbness or drooping on one side of your face     ? Weakness in an arm or leg    ? Confusion or difficulty speaking    ? Dizziness, a severe headache, or vision loss    Call your doctor or diabetes care team provider if:   • You have a sore or wound that will not heal.    • You have a change in the amount you urinate. • Your blood sugar levels are higher than your target goals. • You often have lower blood sugar levels than your target goals. • Your skin is red, dry, warm, or swollen. • You have trouble coping with diabetes, or you feel anxious or depressed. • You have trouble following any part of your care plan, such as your meal plan.     • You have questions or concerns about your condition or care.    What you need to know about high blood sugar levels:  High blood sugar levels may not cause any symptoms. You may feel more thirsty or urinate more often than usual. Over time, high blood sugar levels can damage your nerves, blood vessels, tissues, and organs. The following can increase your blood sugar levels:  • Large meals or large amounts of carbohydrates at one time    • Less physical activity    • Stress    • Illness    • A lower dose of diabetes medicine or insulin, or a late dose    What you need to know about low blood sugar levels:  Symptoms include feeling shaky, dizzy, irritable, or confused. You can prevent symptoms by keeping your blood sugar levels from going too low. • Treat a low blood sugar level right away:      ? Drink 4 ounces of juice or have 1 tube of glucose gel. ? Check your blood sugar level again 10 to 15 minutes later. ? When the level goes back to normal, eat a meal or snack to prevent another decrease. • Keep glucose gel, raisins, or hard candy with you at all times to treat a low blood sugar level. • Your blood sugar level can get too low if you take diabetes medicine or insulin and do not eat enough food. • If you use insulin, check your blood sugar level before you exercise. ? If your blood sugar level is below 100 mg/dL, eat 4 crackers or 2 ounces of raisins, or drink 4 ounces of juice. ? Check your level every 30 minutes if you exercise longer than 1 hour. ? You may need a snack during or after exercise. What you can do to manage your blood sugar levels:   • Check your blood sugar levels as directed and as needed. Several items are available to use to check your levels. You may need to check by testing a drop of blood in a glucose monitor. You may instead be given a continuous glucose monitoring (CGM) device. The device is worn at all times. The CGM checks your blood sugar level every 5 minutes.  It sends results to an electronic device such as a smart phone. A CGM can be used with or without an insulin pump. You and your diabetes care team providers will decide on the best method for you. The goal for blood sugar levels before meals  is between 80 and 130 mg/dL and 2 hours after eating  is lower than 180 mg/dL. • Make healthy food choices. Work with a dietitian to create a meal plan that works for you and your schedule. A dietitian can help you learn how to eat the right amount of carbohydrates (sugar and starchy foods) during your meals and snacks. Examples of carbohydrates are breads, cereals, rice, pasta, fruit, low-fat dairy, and sweets. Carbohydrates can raise your blood sugar level if you eat too many at one time. • Eat high-fiber foods as directed. Fiber helps improve blood sugar levels. Fiber also lowers your risk for heart disease and other problems diabetes can cause. Examples of high-fiber foods include vegetables, whole-grain bread, and beans such as zelaya beans. Your dietitian can tell you how much fiber to have each day. • Get regular physical activity. Physical activity can help you get to your target blood sugar level goal and manage your weight. Get at least 150 minutes of moderate to vigorous aerobic physical activity each week. Resistance training, such as lifting weights, should be done 3 times each week. Do not miss more than 2 days of physical activity in a row. Do not sit longer than 30 minutes at a time. Your healthcare provider can help you create an activity plan. The plan can include the best activities for you and can help you build your strength and endurance. • Maintain a healthy weight. Ask your team what a healthy weight is for you. A healthy weight can help you control diabetes and prevent heart disease. Ask your team to help you create a weight-loss plan, if needed. Even a loss of 3% to 7% of your excess body weight can help make a difference in managing diabetes.  Your team will help you set a weight-loss goal, such as 10 to 15 pounds, or 5% of your extra weight. Together you and your team can set manageable weight-loss goals. • Take your diabetes medicine or insulin as directed. You may need diabetes medicine, insulin, or both to help control your blood sugar levels. Your healthcare provider will teach you how and when to take your diabetes medicine or insulin. You will also be taught about side effects oral diabetes medicine can cause. Insulin may be injected or given through a pump or pen. You and your providers will decide on the best method for you:    ? An insulin pump  is an implanted device that gives your insulin 24 hours a day. An insulin pump prevents the need for multiple insulin injections in a day. ? An insulin pen  is a device prefilled with the right amount of insulin. ? You and your family members will be taught how to draw up and give insulin  if this is the best method for you. Your providers will also teach you how to dispose of needles and syringes. ? You will learn how much insulin you need  and when to give it. You will be taught when not to give insulin. You will also be taught what to do if your blood sugar level drops too low. This may happen if you take insulin and do not eat the right amount of carbohydrates. More ways to manage type 2 diabetes:   • Wear medical alert identification. Wear medical alert jewelry or carry a card that says you have diabetes. Ask your provider where to get these items. • Do not smoke. Nicotine and other chemicals in cigarettes and cigars can cause lung and blood vessel damage. It also makes it more difficult to manage your diabetes. Ask your provider for information if you currently smoke and need help to quit. Do not use e-cigarettes or smokeless tobacco in place of cigarettes or to help you quit. They still contain nicotine.     • Check your feet each day for cuts, scratches, calluses, or other wounds. Look for redness and swelling, and feel for warmth. Wear shoes that fit well. Check your shoes for rocks or other objects that can hurt your feet. Do not walk barefoot or wear shoes without socks. Wear cotton socks to help keep your feet dry. • Ask about vaccines you may need. You have a higher risk for serious illness if you get the flu, pneumonia, COVID-19, or hepatitis. Ask your provider if you should get vaccines to prevent these or other diseases, and when to get the vaccines. • Talk to your provider if you become stressed about diabetes care. Sometimes being able to fit diabetes care into your life can cause increased stress. The stress can cause you not to take care of yourself properly. Your provider can help by offering tips about self-care. A mental health provider can listen and offer help with self-care issues. Other types of counseling can help you make nutrition or physical activity changes. • Have your A1c checked as directed. Your provider may check your A1c every 3 months, or 2 times each year if your diabetes is controlled. An A1c test shows the average amount of sugar in your blood over the past 2 to 3 months. Your provider will tell you what your A1c level should be. • Have screening tests as directed. Your provider may recommend screening for complications of diabetes and other conditions that may develop. Some screenings may begin right away and some may happen within the first 5 years of diagnosis:    ? Examples of diabetes complications  include kidney problems, high cholesterol, high blood pressure, blood vessel problems, eye problems, and sleep apnea. ? You may be screened for a low vitamin B level  if you take oral diabetes medicine for a long time. ? You may be screened for polycystic ovarian syndrome (PCOS)  if you are of childbearing age. Follow up with your doctor or diabetes care team providers as directed:   You may need to have blood tests done before your follow-up visit. The test results will show if changes need to be made in your treatment or self-care. Talk to your provider if you cannot afford your medicine. Write down your questions so you remember to ask them during your visits. © Copyright Julienne Armendariz 2023 Information is for End User's use only and may not be sold, redistributed or otherwise used for commercial purposes. The above information is an  only. It is not intended as medical advice for individual conditions or treatments. Talk to your doctor, nurse or pharmacist before following any medical regimen to see if it is safe and effective for you. What to Do if Your Blood Sugar is Low   AMBULATORY CARE:   Low blood sugar levels  (hypoglycemia) can happen with Type 1 and Type 2 diabetes. Low levels are more likely to happen if you use insulin. Hypoglycemia can cause you to have falls, accidents, and injuries. A blood sugar level that gets too low can lead to seizures, coma, and death. Learn to recognize the symptoms early so you can get treatment quickly. When your blood sugar is low you may feel:  • Sweaty    • Nervous or shaky    • Anxious or irritable    • Confused    • A fast, pounding heartbeat    • Extremely hungry    Have someone call your local emergency number (911 in the [address]) if:   • You cannot be woken. • You have a seizure. Call your doctor if:   • You have symptoms of a low blood sugar level, such as trouble thinking, sweating, or a pounding heartbeat. • Your blood sugar level is lower than normal and it does not improve with treatment. • You often have lower blood sugar levels than your target goals. • You have trouble coping with your illness, or you feel anxious or depressed. • You have questions or concerns about your condition or care. What to do if you have symptoms of low blood sugar:   • Check your blood sugar level, if possible.   Your blood sugar level is too low if it is at or below 70 mg/dL. • Eat or drink 15 grams of fast-acting carbohydrate. Fast-acting carbohydrates will raise your blood sugar level quickly. Examples of 15 grams of fast-acting carbohydrates:     ? 4 ounces (½ cup) of fruit juice     ? 4 ounces of regular soda    ? 2 tablespoons of raisins     ? 1 tube of glucose gel or 3 to 4 glucose tablets       • Check your blood sugar level 15 minutes later. If the level is still low (less than 100 mg/dL), eat another 15 grams of carbohydrate. When the level returns to 100 mg/dL, eat a snack or meal that contains carbohydrates. This will help prevent another drop in blood sugar. • Teach people close to you how to use your glucagon kit. Your blood sugar may be too low for you to be awake. People need to know when and how to use your kit. Prevent low blood sugar levels:  Prevent low blood sugar by knowing what increases your risk. Ask your healthcare provider for ways to prevent low blood sugar levels. Any of the following can increase your risk of low blood sugar:  • Fasting for tests or procedures    • During or after intense exercise    • Late or postponed meals    • Sleeping (you may need a bedtime snack)     • Drinking alcohol if you use insulin or insulin releasing pills    Follow up with your doctor as directed:  Write down your questions so you remember to ask them during your visits. © Copyright Tyrell Sánchez 2023 Information is for End User's use only and may not be sold, redistributed or otherwise used for commercial purposes. The above information is an  only. It is not intended as medical advice for individual conditions or treatments. Talk to your doctor, nurse or pharmacist before following any medical regimen to see if it is safe and effective for you. Type 2 Diabetes Management for Adults   AMBULATORY CARE:   Type 2 diabetes  is a disease that affects how your body uses glucose (sugar).  Either your body cannot make enough insulin, or it cannot use the insulin correctly. It is important to keep diabetes controlled to prevent damage to your heart, blood vessels, and other organs. Management will help you feel well and enjoy your daily activities. Your diabetes care team providers can help you make a plan to fit diabetes care into your schedule. Your plan can change over time to fit your needs and your family's needs. Have someone call your local emergency number (911 in the [address]) if:   • You cannot be woken. • You have signs of diabetic ketoacidosis:     ? confusion, fatigue    ? vomiting    ? rapid heartbeat    ? fruity smelling breath    ? extreme thirst    ? dry mouth and skin    • You have any of the following signs of a heart attack:      ? Squeezing, pressure, or pain in your chest    ? You may  also have any of the following:     - Discomfort or pain in your back, neck, jaw, stomach, or arm    - Shortness of breath    - Nausea or vomiting    - Lightheadedness or a sudden cold sweat    • You have any of the following signs of a stroke:      ? Numbness or drooping on one side of your face     ? Weakness in an arm or leg    ? Confusion or difficulty speaking    ? Dizziness, a severe headache, or vision loss    Call your doctor or diabetes care team provider if:   • You have a sore or wound that will not heal.    • You have a change in the amount you urinate. • Your blood sugar levels are higher than your target goals. • You often have lower blood sugar levels than your target goals. • Your skin is red, dry, warm, or swollen. • You have trouble coping with diabetes, or you feel anxious or depressed. • You have trouble following any part of your care plan, such as your meal plan. • You have questions or concerns about your condition or care. What you need to know about high blood sugar levels:  High blood sugar levels may not cause any symptoms.  You may feel more thirsty or urinate more often than usual. Over time, high blood sugar levels can damage your nerves, blood vessels, tissues, and organs. The following can increase your blood sugar levels:  • Large meals or large amounts of carbohydrates at one time    • Less physical activity    • Stress    • Illness    • A lower dose of diabetes medicine or insulin, or a late dose    What you need to know about low blood sugar levels:  Symptoms include feeling shaky, dizzy, irritable, or confused. You can prevent symptoms by keeping your blood sugar levels from going too low. • Treat a low blood sugar level right away:      ? Drink 4 ounces of juice or have 1 tube of glucose gel. ? Check your blood sugar level again 10 to 15 minutes later. ? When the level goes back to normal, eat a meal or snack to prevent another decrease. • Keep glucose gel, raisins, or hard candy with you at all times to treat a low blood sugar level. • Your blood sugar level can get too low if you take diabetes medicine or insulin and do not eat enough food. • If you use insulin, check your blood sugar level before you exercise. ? If your blood sugar level is below 100 mg/dL, eat 4 crackers or 2 ounces of raisins, or drink 4 ounces of juice. ? Check your level every 30 minutes if you exercise longer than 1 hour. ? You may need a snack during or after exercise. What you can do to manage your blood sugar levels:   • Check your blood sugar levels as directed and as needed. Several items are available to use to check your levels. You may need to check by testing a drop of blood in a glucose monitor. You may instead be given a continuous glucose monitoring (CGM) device. The device is worn at all times. The CGM checks your blood sugar level every 5 minutes. It sends results to an electronic device such as a smart phone. A CGM can be used with or without an insulin pump. You and your diabetes care team providers will decide on the best method for you.  The goal for blood sugar levels before meals  is between 80 and 130 mg/dL and 2 hours after eating  is lower than 180 mg/dL. • Make healthy food choices. Work with a dietitian to create a meal plan that works for you and your schedule. A dietitian can help you learn how to eat the right amount of carbohydrates (sugar and starchy foods) during your meals and snacks. Examples of carbohydrates are breads, cereals, rice, pasta, fruit, low-fat dairy, and sweets. Carbohydrates can raise your blood sugar level if you eat too many at one time. • Eat high-fiber foods as directed. Fiber helps improve blood sugar levels. Fiber also lowers your risk for heart disease and other problems diabetes can cause. Examples of high-fiber foods include vegetables, whole-grain bread, and beans such as zelaya beans. Your dietitian can tell you how much fiber to have each day. • Get regular physical activity. Physical activity can help you get to your target blood sugar level goal and manage your weight. Get at least 150 minutes of moderate to vigorous aerobic physical activity each week. Resistance training, such as lifting weights, should be done 3 times each week. Do not miss more than 2 days of physical activity in a row. Do not sit longer than 30 minutes at a time. Your healthcare provider can help you create an activity plan. The plan can include the best activities for you and can help you build your strength and endurance. • Maintain a healthy weight. Ask your team what a healthy weight is for you. A healthy weight can help you control diabetes and prevent heart disease. Ask your team to help you create a weight-loss plan, if needed. Even a loss of 3% to 7% of your excess body weight can help make a difference in managing diabetes. Your team will help you set a weight-loss goal, such as 10 to 15 pounds, or 5% of your extra weight. Together you and your team can set manageable weight-loss goals.     • Take your diabetes medicine or insulin as directed. You may need diabetes medicine, insulin, or both to help control your blood sugar levels. Your healthcare provider will teach you how and when to take your diabetes medicine or insulin. You will also be taught about side effects oral diabetes medicine can cause. Insulin may be injected or given through a pump or pen. You and your providers will decide on the best method for you:    ? An insulin pump  is an implanted device that gives your insulin 24 hours a day. An insulin pump prevents the need for multiple insulin injections in a day. ? An insulin pen  is a device prefilled with the right amount of insulin. ? You and your family members will be taught how to draw up and give insulin  if this is the best method for you. Your providers will also teach you how to dispose of needles and syringes. ? You will learn how much insulin you need  and when to give it. You will be taught when not to give insulin. You will also be taught what to do if your blood sugar level drops too low. This may happen if you take insulin and do not eat the right amount of carbohydrates. More ways to manage type 2 diabetes:   • Wear medical alert identification. Wear medical alert jewelry or carry a card that says you have diabetes. Ask your provider where to get these items. • Do not smoke. Nicotine and other chemicals in cigarettes and cigars can cause lung and blood vessel damage. It also makes it more difficult to manage your diabetes. Ask your provider for information if you currently smoke and need help to quit. Do not use e-cigarettes or smokeless tobacco in place of cigarettes or to help you quit. They still contain nicotine. • Check your feet each day for cuts, scratches, calluses, or other wounds. Look for redness and swelling, and feel for warmth. Wear shoes that fit well. Check your shoes for rocks or other objects that can hurt your feet.  Do not walk barefoot or wear shoes without socks. Wear cotton socks to help keep your feet dry. • Ask about vaccines you may need. You have a higher risk for serious illness if you get the flu, pneumonia, COVID-19, or hepatitis. Ask your provider if you should get vaccines to prevent these or other diseases, and when to get the vaccines. • Talk to your provider if you become stressed about diabetes care. Sometimes being able to fit diabetes care into your life can cause increased stress. The stress can cause you not to take care of yourself properly. Your provider can help by offering tips about self-care. A mental health provider can listen and offer help with self-care issues. Other types of counseling can help you make nutrition or physical activity changes. • Have your A1c checked as directed. Your provider may check your A1c every 3 months, or 2 times each year if your diabetes is controlled. An A1c test shows the average amount of sugar in your blood over the past 2 to 3 months. Your provider will tell you what your A1c level should be. • Have screening tests as directed. Your provider may recommend screening for complications of diabetes and other conditions that may develop. Some screenings may begin right away and some may happen within the first 5 years of diagnosis:    ? Examples of diabetes complications  include kidney problems, high cholesterol, high blood pressure, blood vessel problems, eye problems, and sleep apnea. ? You may be screened for a low vitamin B level  if you take oral diabetes medicine for a long time. ? You may be screened for polycystic ovarian syndrome (PCOS)  if you are of childbearing age. Follow up with your doctor or diabetes care team providers as directed: You may need to have blood tests done before your follow-up visit. The test results will show if changes need to be made in your treatment or self-care. Talk to your provider if you cannot afford your medicine. Write down your questions so you remember to ask them during your visits. © Copyright Tyrell Sánchez 2023 Information is for End User's use only and may not be sold, redistributed or otherwise used for commercial purposes. The above information is an  only. It is not intended as medical advice for individual conditions or treatments. Talk to your doctor, nurse or pharmacist before following any medical regimen to see if it is safe and effective for you.

## 2023-09-25 NOTE — ASSESSMENT & PLAN NOTE
Patient does report to me ongoing symptoms of anxiety and depression she reports his Cymbalta is not effective we will discontinue the Cymbalta she will need to wean off the Cymbalta I like her to take Cymbalta 30 mg once a day x2 weeks and then discontinue she can  then start Effexor XR 37.5 mg 1 tablet daily we did review the risk benefits and side effects of the medication.   Today I did  the patient no SI

## 2023-09-25 NOTE — PROGRESS NOTES
Assessment/Plan:    Type 2 diabetes mellitus with complication, with long-term current use of insulin (HCC)    Lab Results   Component Value Date    HGBA1C 9.2 (A) 09/25/2023   Recent hospitalization for hypoglycemia severe likely secondary to p.o. intake and the sensor not working on her monitor currently she is now on basal bolus treatment with long-acting along with short acting insulin under the care of her endocrinologist.  She does not report any further hypoglycemia. She is increased her caloric intake I will have her meet with clinical pharmacist Farhad Landis along with follow-up with her endocrinologist she will need help with the sensor    Stage 4 chronic kidney disease ([address])  Drink adequate amount of water, avoid anti-inflammatories, reduce sodium in the diet and will continue to monitor the kidney function    Class 3 severe obesity due to excess calories without serious comorbidity in adult, unspecified BMI ([address])  Obesity -I have counseled patient following healthy and balanced diet, I would like the patient to lose weight, I would like the patient exercise routinely; we will continue monitor the patient's progress. Anxiety  Patient does report to me ongoing symptoms of anxiety and depression she reports his Cymbalta is not effective we will discontinue the Cymbalta she will need to wean off the Cymbalta I like her to take Cymbalta 30 mg once a day x2 weeks and then discontinue she can  then start Effexor XR 37.5 mg 1 tablet daily we did review the risk benefits and side effects of the medication.   Today I did  the patient no SI         Problem List Items Addressed This Visit        Endocrine    Type 2 diabetes mellitus with complication, with long-term current use of insulin ([address]) - Primary (Chronic)       Lab Results   Component Value Date    HGBA1C 9.2 (A) 09/25/2023   Recent hospitalization for hypoglycemia severe likely secondary to p.o. intake and the sensor not working on her monitor currently she is now on basal bolus treatment with long-acting along with short acting insulin under the care of her endocrinologist.  She does not report any further hypoglycemia. She is increased her caloric intake I will have her meet with clinical pharmacist Nuha Reyes along with follow-up with her endocrinologist she will need help with the sensor         Relevant Orders    Ambulatory referral to clinical pharmacy       Genitourinary    Stage 4 chronic kidney disease ([address])     Drink adequate amount of water, avoid anti-inflammatories, reduce sodium in the diet and will continue to monitor the kidney function         Kidney stone    Relevant Orders    US kidney and bladder       Other    Class 3 severe obesity due to excess calories without serious comorbidity in adult, unspecified BMI ([address])     Obesity -I have counseled patient following healthy and balanced diet, I would like the patient to lose weight, I would like the patient exercise routinely; we will continue monitor the patient's progress. Anxiety     Patient does report to me ongoing symptoms of anxiety and depression she reports his Cymbalta is not effective we will discontinue the Cymbalta she will need to wean off the Cymbalta I like her to take Cymbalta 30 mg once a day x2 weeks and then discontinue she can  then start Effexor XR 37.5 mg 1 tablet daily we did review the risk benefits and side effects of the medication. Today I did  the patient no SI         Relevant Medications    DULoxetine (Cymbalta) 30 mg delayed release capsule    venlafaxine (EFFEXOR-XR) 37.5 mg 24 hr capsule       RTO in 4 to 6 weeks call if any problems  Subjective:      Patient ID: Racquel De Los Santos is a 69 y.o. female. Pt. Was at a DR. Appt. At [facility] and was altered. Pt.'s BG was 26. Pt. Was given 1 g glucagon and orange juice. Pt's BG upon arrival was 110.  GCS 13       HPI 74-year old female coming in for a follow up office visit regarding hypoglycemia, type 2 diabetes, anxiety, depression, chronic kidney disease, obesity; the patient reports me compliant taking medications without untoward side effects the. The patient is here to review his medical condition, update me on the medical condition and the patient reports me 1 hospitalizations and 1 ER visits. Recent hospitalization for severe hypoglycemia. Wasn't wearing the sensor at the time , pt has the sensor,  ? If doing it right; will see Dr Efrain Ji at night difficulty with sleep since the shingles last yr and couldn't sleep at night ,  has said some nasty things , dosent watch work, no physical abuse , working with [employer],  No si  At times get pimples and some itching from the shingles  Family problems , grandchildren going to college , daughter staying at my house, grand daughter got new appartment, daughter in college wants to come home  She has not had any further hypoglycemia since the hospitalization she is now on long-acting insulin along with short acting insulin she is off the pump she has gotten a new sensor but will need help putting the new sensor on. Patient does report to me significant anxiety depression related to relationship with her family members she reports me that when they have problems they are on the patient which leads to the increase in the patient's anxiety level. No SI she reports me that the Cymbalta does not appear to be working at this point  The following portions of the patient's history were reviewed and updated as appropriate: allergies, current medications, past family history, past medical history, past social history, past surgical history and problem list.  MADAN-7 Flowsheet Screening    Flowsheet Row Most Recent Value   Over the last 2 weeks, how often have you been bothered by any of the following problems?     Feeling nervous, anxious, or on edge 3   Not being able to stop or control worrying 3   Worrying too much about different things 3 Trouble relaxing 3   Being so restless that it is hard to sit still 0   Becoming easily annoyed or irritable 1   Feeling afraid as if something awful might happen 2   MADAN-7 Total Score 15        PHQ-2/9 Depression Screening    Little interest or pleasure in doing things: 0 - not at all  Feeling down, depressed, or hopeless: 0 - not at all  Trouble falling or staying asleep, or sleeping too much: 0 - not at all  Feeling tired or having little energy: 0 - not at all  Poor appetite or overeatin - not at all  Feeling bad about yourself - or that you are a failure or have let yourself or your family down: 0 - not at all  Trouble concentrating on things, such as reading the newspaper or watching television: 0 - not at all  Moving or speaking so slowly that other people could have noticed. Or the opposite - being so fidgety or restless that you have been moving around a lot more than usual: 0 - not at all  Thoughts that you would be better off dead, or of hurting yourself in some way: 0 - not at all  PHQ-9 Score: 0   PHQ-9 Interpretation: No or Minimal depression        Review of Systems   Constitutional: Negative for activity change, appetite change and unexpected weight change. HENT: Negative for congestion and postnasal drip. Eyes: Negative for visual disturbance. Respiratory: Negative for cough and shortness of breath. Cardiovascular: Negative for chest pain. Gastrointestinal: Negative for abdominal pain, diarrhea, nausea and vomiting. Neurological: Negative for dizziness, light-headedness and headaches. Hematological: Negative for adenopathy. Psychiatric/Behavioral: Negative for suicidal ideas. The patient is nervous/anxious. Objective:    Return in about 6 months (around 3/25/2024).     Procedure: VAS lower limb venous duplex study, complete bilateral    Result Date: 2023  Narrative:  [facility] REPORT CLINICAL: Indications: Physician wants to determine patency of the venous system secondary to slightly elevated D-dimer. Patient has history of right lower extremity DVT over 10 years ago. Operative History: 1985-01-01 laminectomy CABG Cholecystectomy Lt GSV harvest Risk Factors The patient has history of HTN, Diabetes (IDDM adult), Hyperlipidemia, CAD and DVT. FINDINGS:  Right    Impression                           CFV      E1. Non Occlusive Thrombus (Chronic)     CONCLUSION:  Impression:  RIGHT LOWER LIMB: No evidence of acute deep vein thrombosis. Evidence of focal, chronic non-occlusive thrombus noted in the common femoral vein at the level of the saphenofemoral junction. No evidence of superficial thrombophlebitis noted. Doppler evaluation shows a normal response to augmentation maneuvers. . Popliteal, posterior tibial and anterior tibial arterial Doppler waveform's are triphasic. LEFT LOWER LIMB: No evidence of acute or chronic deep vein thrombosis. No evidence of superficial thrombophlebitis noted. Doppler evaluation shows a normal response to augmentation maneuvers. Popliteal, posterior tibial and anterior tibial arterial Doppler waveform's are triphasic. Technical findings were faxed to chart. SIGNATURE: Electronically Signed by: Jerardo Dowd MD [facility] Suzan Barcenas on [id number] 12:33:27 PM    Procedure: US kidney and bladder    Result Date: 8/31/2023  Narrative: RENAL ULTRASOUND INDICATION:   r/o worsening hydronephrosis; nephrolith and mild hydro of R kidney demonstrated on CT 8/26. COMPARISON: CT the abdomen pelvis dated August 26, 2023 TECHNIQUE:   Ultrasound of the retroperitoneum was performed with a curvilinear transducer utilizing volumetric sweeps and still imaging techniques. FINDINGS: KIDNEYS: Symmetric and normal size. Right kidney:  10.6 x 4.0 x 3.5 cm. Volume 76.6 mL Left kidney:  9.3 x 4.6 x 3.6 cm. Volume 80.5 mL Right kidney Normal echogenicity and contour. No mass is identified. 1.9 cm simple cyst in the lower pole. No hydronephrosis.  No shadowing calculi. No perinephric fluid collections. Left kidney Normal echogenicity and contour. No mass is identified. Small cysts measuring 1.9 cm and 1.6 cm in the mid kidney. No hydronephrosis. No shadowing calculi. Mild contour deformity with a 3 mm echogenic focus in the lower pole,, likely related to cortical scarring and/or calcification. No perinephric fluid collections. URETERS: Nonvisualized. BLADDER: Normally distended. No focal thickening or mass lesions. Small echogenic focus with twinkle artifact at the right UVJ, likely corresponding to the stone seen on prior CT. Bilateral ureteral jets detected. Impression: Small echogenic focus with twinkle artifact at the right UVJ, likely corresponding to the stone seen on prior CT. Bilateral ureteral jets detected. No hydronephrosis. Workstation performed: [id number]     Procedure: XR chest 1 view portable    Result Date: 8/31/2023  Narrative: CHEST INDICATION:   sob. COMPARISON: Chest radiograph 10/27/2022 EXAM PERFORMED/VIEWS:  XR CHEST PORTABLE FINDINGS:  Monitoring leads and clips project over the chest. Sternotomy wires are present. Cardiomediastinal silhouette appears unremarkable. The lungs are clear. No pneumothorax or pleural effusion. Osseous structures appear within normal limits for patient age. Impression: No acute cardiopulmonary disease. Workstation performed: [id number]     Procedure: MRI cervical spine wo contrast    Result Date: 8/30/2023  Narrative: MRI CERVICAL SPINE WITHOUT CONTRAST INDICATION: M48.02: Spinal stenosis, cervical region G99.2: Myelopathy in diseases classified elsewhere. COMPARISON: CT from 6/20/2019 and MRI from 10/2/2015. TECHNIQUE:  Multiplanar, multisequence imaging of the cervical spine was performed. . IMAGE QUALITY:  Diagnostic FINDINGS: ALIGNMENT:  Normal alignment of the cervical spine. No compression fracture. No subluxation. No scoliosis. MARROW SIGNAL: Small hemangioma within the C2.  No suspicious marrow signal abnormality identified. CERVICAL AND VISUALIZED THORACIC CORD:  Normal signal within the visualized cord. PREVERTEBRAL AND PARASPINAL SOFT TISSUES:  Normal. VISUALIZED POSTERIOR FOSSA:  The visualized posterior fossa demonstrates no abnormal signal. CERVICAL DISC SPACES: C2-C3: There is facet arthrosis. There is no significant canal stenosis or foraminal narrowing. C3-C4: There is facet arthrosis. There is no significant canal stenosis or foraminal narrowing. C4-C5: There is a disc osteophyte complex with uncovertebral spurring. There is facet arthrosis. There is mild canal stenosis. There is severe left foraminal narrowing. There is mild right foraminal narrowing. C5-C6: There is a disc osteophyte complex with uncovertebral spurring. There is facet arthrosis. There is mild canal stenosis. There is severe right foraminal narrowing. There is no significant left foraminal narrowing. C6-C7: No significant canal stenosis or foraminal narrowing. C7-T1: No significant canal stenosis or foraminal narrowing. UPPER THORACIC DISC SPACES:  Normal. OTHER FINDINGS:  None. Impression: Multilevel cervical spondylosis, as described above Multifactorial disease results in severe foraminal narrowing on the left at C4-C5 and on the right at C5-C6. Findings are overall similar to CT from 6/20/2019. Workstation performed: [id number]     Procedure: CT abdomen pelvis wo contrast    Result Date: 8/26/2023  Narrative: CT ABDOMEN AND PELVIS WITHOUT IV CONTRAST INDICATION:   Abdominal pain, acute, nonlocalized Generalized Abdominal Pain. Abdominal pain, urinary frequency. COMPARISON: April 18, 2017 and report from [facility] dated January 12, 2023. TECHNIQUE:  CT examination of the abdomen and pelvis was performed without intravenous contrast. Multiplanar 2D reformatted images were created from the source data.  This examination, like all CT scans performed in the [facility], was performed utilizing techniques to minimize radiation dose exposure, including the use of iterative reconstruction and automated exposure control. Radiation dose length product (DLP) for this visit:  1181 mGy-cm Enteric contrast was not administered. FINDINGS: ABDOMEN LOWER CHEST: There is a 6 mm pulmonary nodule within the right middle lobe of the lung (series 301 image 10). This is unchanged compared with April 18, 2017, supporting a benign etiology. Minimal linear scarring left lung base unchanged. Mitral annular calcifications. LIVER/BILIARY TREE:  Unremarkable. GALLBLADDER: Gallbladder is surgically absent. SPLEEN:  Unremarkable. PANCREAS:  Unremarkable. ADRENAL GLANDS:  Unremarkable. KIDNEYS/URETERS: Small renal cysts. Additionally, there is a 1.1 cm hyperdense lesion arising from the lateral aspect lower pole right kidney, measuring 57 Hounsfield units (series 301 image 76). Follow-up recommended. There is a 3 mm calculus in the right ureterovesical junction. There is mild right hydronephrosis and right hydroureter to the level of the calculus. No left hydronephrosis. STOMACH AND BOWEL: No bowel obstruction. There is colonic diverticulosis without evidence of acute diverticulitis. APPENDIX:  No findings to suggest appendicitis. ABDOMINOPELVIC CAVITY:  No ascites. No pneumoperitoneum. No lymphadenopathy. VESSELS: Atherosclerosis. No abdominal aortic aneurysm. PELVIS REPRODUCTIVE ORGANS: Prior hysterectomy. URINARY BLADDER:  Unremarkable. ABDOMINAL WALL/INGUINAL REGIONS: There is infiltration of the superficial subcutaneous fat of the lower left anterior abdominal wall (series 301 image 106-115). OSSEOUS STRUCTURES:  No acute fracture or destructive osseous lesion. Spinal degenerative changes. Impression: There is mild right hydronephrosis and right hydroureter related to a 3 mm calculus in the right ureterovesical junction. There is a 1.1 cm hyperdense lesion arising from the lateral aspect lower pole right kidney.  This was not clearly demonstrable on the April 18, 2017 examination, however, was described on the January 12, 2023 report from [facility]. Nonemergent outpatient renal ultrasound is recommended for further evaluation. There is infiltration of the superficial subcutaneous fat of the lower left anterior abdominal wall. This may possibly be related to recent injection procedure. Clinical correlation recommended. Atherosclerosis. Colonic diverticulosis without evidence of acute diverticulitis. No bowel obstruction. Other nonemergent findings as above. This examination demonstrates findings for which clinical and imaging follow-up is recommended and was logged as such in EPIC. The study was marked in [facility] for immediate notification.  Workstation performed: [id number]          Allergies   Allergen Reactions   • Molds & Smuts Allergic Rhinitis   • Other Allergic Rhinitis     RAGWEED, CAT DANDER, DOG DANDER    • Pollen Extract Other (See Comments)     Cold symptoms         Past Medical History:   Diagnosis Date   • Acute embolism and thrombosis of unspecified deep veins of unspecified lower extremity (HCC)     Last Assessed:  5/18/17   • Anemia    • Anosmia    • Anxiety    • Arthritis    • Asthma    • Back pain    • Bilateral macular retinal edema    • CAD (coronary artery disease)    • Cataract    • Cervical disc herniation    • Cervical radiculopathy    • Cervical spinal stenosis    • Cervical spondylolysis    • Chronic kidney disease    • Chronic mastoiditis    • Colon polyp    • Complex endometrial hyperplasia    • Depression    • Diabetes mellitus (HCC)    • Disease of thyroid gland    • DVT (deep venous thrombosis) ([address])    • DVT (deep venous thrombosis) ([address]) 6/16/2017   • Fibromyalgia    • Fibromyalgia, primary    • Hyperlipidemia    • Hypertension    • Hypothyroid    • Kidney stone    • Lumbar radiculopathy    • Neck pain    • Obese    • PONV (postoperative nausea and vomiting)    • Shingles 07/01/2021   • Spinal stenosis    • Stomach ulcer    • Thyroid disease      Past Surgical History:   Procedure Laterality Date   • BACK SURGERY     • CARPAL TUNNEL RELEASE     • CATARACT EXTRACTION     • CHOLECYSTECTOMY     • COLONOSCOPY     • CORONARY ANGIOPLASTY     • CORONARY ARTERY BYPASS GRAFT     • CYSTOSCOPY N/A 6/20/2017    Procedure: CYSTOSCOPY;  Surgeon: Ghulam Khoury MD;  [facility]: [facility];  Service: Gynecology Oncology   • CYSTOSCOPY     • ND LAPS TOTAL HYSTERECT 250 GM/< W/RMVL TUBE/OVARY N/A 6/20/2017    Procedure: ROBOTIC HYSTERECTOMY; BILATERAL SALPINO-OOPHERECTOMY; umbilical hernia repair.;  Surgeon: Ghulam Khoury MD;  [facility];  Service: Gynecology Oncology   • ND REPAIR FIRST ABDOMINAL WALL HERNIA N/A 5/12/2022    Procedure: REPAIR HERNIA INCISIONAL;  Surgeon: Roberto Caicedo DO;  Location[facility];  Service: General   • TONSILECTOMY AND ADNOIDECTOMY     • TONSILLECTOMY     • UMBILICAL HERNIA REPAIR       Current Outpatient Medications on File Prior to Visit   Medication Sig Dispense Refill   • ALPRAZolam (XANAX) 0.5 mg tablet TAKE 1 TABLET (0.5 MG TOTAL) BY MOUTH 2 (TWO) TIMES A DAY AS NEEDED FOR ANXIETY.  60 tablet 0   • aspirin 81 MG tablet Take 81 mg by mouth daily     • B-D UF III MINI PEN NEEDLES 31G X 5 MM MISC      • BAQSIMI TWO PACK 3 MG/DOSE POWD Use as directed   0   • BD Insulin Syringe U/F 31G X 5/16" 0.5 ML MISC 4 (four) times a day As directed     • cholecalciferol (VITAMIN D3) 1,000 units tablet Take 2,000 Units by mouth daily     • Coenzyme Q10 (Co Q-10) 200 MG CAPS Take by mouth in the morning     • Continuous Blood Gluc Sensor (Dexcom G6 Sensor) MISC Use 1 each     • diclofenac sodium (VOLTAREN) 1 % Apply 2 g topically 4 (four) times a day (Patient taking differently: Apply 2 g topically if needed) 1 Tube 0   • diltiazem (TIAZAC) 300 MG 24 hr capsule Take 300 mg by mouth daily     • Docusate Sodium (DSS) 100 MG CAPS Take 1 capsule by mouth every 12 (twelve) hours • DULoxetine (CYMBALTA) 60 mg delayed release capsule TAKE 1 CAPSULE BY MOUTH EVERY DAY 90 capsule 3   • irbesartan (AVAPRO) 300 mg tablet Take 300 mg by mouth     • isosorbide mononitrate (IMDUR) 60 mg 24 hr tablet TAKE 1.5 TABs AT NIGHT     • levothyroxine 50 mcg tablet Take 50 mcg by mouth daily     • nitroglycerin (NITROLINGUAL) 0.4 mg/spray spray   1   • NovoLOG 100 UNIT/ML SOLN 10 units with meals     • rosuvastatin (CRESTOR) 20 MG tablet Take 20 mg by mouth every evening    2   • Toujeo SoloStar 300 units/mL CONCENTRATED U-300 injection pen (1-unit dial) 30 Units in the morning     • [DISCONTINUED] fluocinonide (LIDEX) 0.05 % external solution  (Patient not taking: Reported on 9/14/2023)     • [DISCONTINUED] Insulin Disposable Pump (OmniPod Dash 5 Pack Pods) MISC USE 1 POD EVERY 1.5 DAYS (Patient not taking: Reported on 9/14/2023)     • [DISCONTINUED] labetalol (NORMODYNE) 100 mg tablet Take 1 tablet (100 mg total) by mouth 2 (two) times a day for 2 days Only take the medication when is over 180/90. (Patient not taking: Reported on 9/14/2023) 4 tablet 0   • [DISCONTINUED] naloxone (NARCAN) 4 mg/0.1 mL nasal spray Administer 1 spray into a nostril. If no response after 2-3 minutes, give another dose in the other nostril using a new spray. (Patient not taking: Reported on 9/5/2023) 1 each 1   • [DISCONTINUED] polyethylene glycol (MIRALAX) 17 g packet Take 17 g by mouth daily for 14 days Continue taking until having regular bowel movements, then stop. Do not start before September 3, 2023. (Patient not taking: Reported on 9/5/2023) 238 g 0   • [DISCONTINUED] senna-docusate sodium (SENOKOT S) 8.6-50 mg per tablet Take 2 tablets by mouth 2 (two) times a day until having regular bowel movements, then stop. (Patient not taking: Reported on 9/14/2023) 120 tablet 0   • [DISCONTINUED] tamsulosin (FLOMAX) 0.4 mg Take 1 capsule (0.4 mg total) by mouth daily with dinner Do not start before August 27, 2023.  (Patient not taking: Reported on 2023) 9 capsule 0     No current facility-administered medications on file prior to visit. Family History   Problem Relation Age of Onset   • Arthritis Mother    • Leukemia Mother    • Other Mother         Anxiety, major depressive disorder, recurrent episode with atypical features   • Coronary artery disease Father         Heart problem   • Diabetes Father    • Other Father         Infectious disease   • Alzheimer's disease Maternal Grandmother    • Other Maternal Grandfather         Heart problem   • Other Daughter         Anxiety, major depressive disorder, recurrent episode with atypical features   • Alcohol abuse Other         Grandparent   • Cancer Family    • Diabetes Family    • Hypertension Family    • Other Family         Reported prior back trouble, thyroid disorder     Social History     Socioeconomic History   • Marital status: /Civil Union     Spouse name: Not on file   • Number of children: 2   • Years of education: High school or GED   • Highest education level: Not on file   Occupational History   • Occupation: Retired   Tobacco Use   • Smoking status: Former     Packs/day: 1.00     Years: 6.00     Total pack years: 6.00     Types: Cigarettes     Quit date:      Years since quittin.7   • Smokeless tobacco: Never   • Tobacco comments:     Smoked about a half a pack for about 4 yrs. Quite about 50 yrs ago.    Vaping Use   • Vaping Use: Never used   Substance and Sexual Activity   • Alcohol use: No   • Drug use: No   • Sexual activity: Not Currently     Comment: No known STD risk factors   Other Topics Concern   • Not on file   Social History Narrative    Lives independently with spouse    No known risk factors    Denied:  Exercising regularly     Social Determinants of Health     Financial Resource Strain: Low Risk  (2022)    Overall Financial Resource Strain (CARDIA)    • Difficulty of Paying Living Expenses: Not hard at all   Food Insecurity: Not on file   Transportation Needs: No Transportation Needs (12/9/2022)    PRAPARE - Transportation    • Lack of Transportation (Medical): No    • Lack of Transportation (Non-Medical):  No   Physical Activity: Not on file   Stress: Not on file   Social Connections: Not on file   Intimate Partner Violence: Not on file   Housing Stability: Not on file     Vitals:    09/25/23 0924   BP: 134/74   Pulse: 72   Resp: 16   SpO2: 98%   Weight: 87.7 kg (193 lb 6.4 oz)   Height: 4' 11" (1.499 m)     Results for orders placed or performed during the hospital encounter of 08/31/23   CBC and differential   Result Value Ref Range    WBC 12.74 (H) 4.31 - 10.16 Thousand/uL    RBC 4.97 3.81 - 5.12 Million/uL    Hemoglobin 13.3 11.5 - 15.4 g/dL    Hematocrit 41.3 34.8 - 46.1 %    MCV 83 82 - 98 fL    MCH 26.8 26.8 - 34.3 pg    MCHC 32.2 31.4 - 37.4 g/dL    RDW 15.8 (H) 11.6 - 15.1 %    MPV 10.4 8.9 - 12.7 fL    Platelets 812 835 - 624 Thousands/uL    nRBC 0 /100 WBCs    Neutrophils Relative 80 (H) 43 - 75 %    Immat GRANS % 0 0 - 2 %    Lymphocytes Relative 14 14 - 44 %    Monocytes Relative 5 4 - 12 %    Eosinophils Relative 0 0 - 6 %    Basophils Relative 1 0 - 1 %    Neutrophils Absolute 10.13 (H) 1.85 - 7.62 Thousands/µL    Immature Grans Absolute 0.05 0.00 - 0.20 Thousand/uL    Lymphocytes Absolute 1.84 0.60 - 4.47 Thousands/µL    Monocytes Absolute 0.65 0.17 - 1.22 Thousand/µL    Eosinophils Absolute 0.01 0.00 - 0.61 Thousand/µL    Basophils Absolute 0.06 0.00 - 0.10 Thousands/µL   Comprehensive metabolic panel   Result Value Ref Range    Sodium 136 135 - 147 mmol/L    Potassium 4.5 3.5 - 5.3 mmol/L    Chloride 103 96 - 108 mmol/L    CO2 19 (L) 21 - 32 mmol/L    ANION GAP 14 mmol/L    BUN 24 5 - 25 mg/dL    Creatinine 1.86 (H) 0.60 - 1.30 mg/dL    Glucose 96 65 - 140 mg/dL    Calcium 10.4 (H) 8.4 - 10.2 mg/dL    AST 27 13 - 39 U/L    ALT 16 7 - 52 U/L    Alkaline Phosphatase 115 (H) 34 - 104 U/L    Total Protein 8.9 (H) 6.4 - 8.4 g/dL Albumin 4.5 3.5 - 5.0 g/dL    Total Bilirubin 0.62 0.20 - 1.00 mg/dL    eGFR 25 ml/min/1.73sq m   HS Troponin 0hr (reflex protocol)   Result Value Ref Range    hs TnI 0hr 10 "Refer to ACS Flowchart"- see link ng/L   Lipase   Result Value Ref Range    Lipase 15 11 - 82 u/L   D-Dimer   Result Value Ref Range    D-Dimer, Quant 1.10 (H) <0.50 ug/ml FEU   Blood gas, venous   Result Value Ref Range    pH, Dae 7.497 (H) 7.300 - 7.400    pCO2, Dae 23.6 (L) 42.0 - 50.0 mm Hg    pO2, Dae 50.3 (H) 35.0 - 45.0 mm Hg    HCO3, Dae 17.9 (L) 24 - 30 mmol/L    Base Excess, Dae -3.3 mmol/L    O2 Content, Dae 17.4 ml/dL    O2 HGB, VENOUS 86.0 (H) 60.0 - 80.0 %   HS Troponin I 2hr   Result Value Ref Range    hs TnI 2hr 8 "Refer to ACS Flowchart"- see link ng/L    Delta 2hr hsTnI -2 <20 ng/L   UA w Reflex to Microscopic w Reflex to Culture    Specimen: Urine, Clean Catch   Result Value Ref Range    Color, UA Light Yellow     Clarity, UA Clear     Specific Gravity, UA 1.019 1.003 - 1.030    pH, UA 5.5 4.5, 5.0, 5.5, 6.0, 6.5, 7.0, 7.5, 8.0    Leukocytes, UA Negative Negative    Nitrite, UA Negative Negative    Protein,  (2+) (A) Negative mg/dl    Glucose,  (1/2%) (A) Negative mg/dl    Ketones, UA Trace (A) Negative mg/dl    Urobilinogen, UA <2.0 <2.0 mg/dl mg/dl    Bilirubin, UA Negative Negative    Occult Blood, UA Moderate (A) Negative   Urine Microscopic   Result Value Ref Range    RBC, UA 1-2 None Seen, 1-2 /hpf    WBC, UA 4-10 (A) None Seen, 1-2 /hpf    Epithelial Cells Occasional None Seen, Occasional /hpf    Bacteria, UA None Seen None Seen, Occasional /hpf    Hyaline Casts, UA 5-10 (A) None Seen /lpf   AM CMP   Result Value Ref Range    Sodium 131 (L) 135 - 147 mmol/L    Potassium 4.2 3.5 - 5.3 mmol/L    Chloride 97 96 - 108 mmol/L    CO2 24 21 - 32 mmol/L    ANION GAP 10 mmol/L    BUN 28 (H) 5 - 25 mg/dL    Creatinine 1.85 (H) 0.60 - 1.30 mg/dL    Glucose 247 (H) 65 - 140 mg/dL    Calcium 8.9 8.4 - 10.2 mg/dL AST 20 13 - 39 U/L    ALT 13 7 - 52 U/L    Alkaline Phosphatase 90 34 - 104 U/L    Total Protein 7.3 6.4 - 8.4 g/dL    Albumin 3.8 3.5 - 5.0 g/dL    Total Bilirubin 0.72 0.20 - 1.00 mg/dL    eGFR 26 ml/min/1.73sq m   AM CBC and Diff   Result Value Ref Range    WBC 9.46 4.31 - 10.16 Thousand/uL    RBC 3.99 3.81 - 5.12 Million/uL    Hemoglobin 11.0 (L) 11.5 - 15.4 g/dL    Hematocrit 34.6 (L) 34.8 - 46.1 %    MCV 87 82 - 98 fL    MCH 27.6 26.8 - 34.3 pg    MCHC 31.8 31.4 - 37.4 g/dL    RDW 15.8 (H) 11.6 - 15.1 %    MPV 10.3 8.9 - 12.7 fL    Platelets 676 768 - 119 Thousands/uL    nRBC 0 /100 WBCs    Neutrophils Relative 70 43 - 75 %    Immat GRANS % 1 0 - 2 %    Lymphocytes Relative 22 14 - 44 %    Monocytes Relative 7 4 - 12 %    Eosinophils Relative 0 0 - 6 %    Basophils Relative 0 0 - 1 %    Neutrophils Absolute 6.67 1.85 - 7.62 Thousands/µL    Immature Grans Absolute 0.05 0.00 - 0.20 Thousand/uL    Lymphocytes Absolute 2.03 0.60 - 4.47 Thousands/µL    Monocytes Absolute 0.65 0.17 - 1.22 Thousand/µL    Eosinophils Absolute 0.02 0.00 - 0.61 Thousand/µL    Basophils Absolute 0.04 0.00 - 0.10 Thousands/µL   BMP AM Draw X 3 days starting day 2   Result Value Ref Range    Sodium 139 135 - 147 mmol/L    Potassium 4.2 3.5 - 5.3 mmol/L    Chloride 101 96 - 108 mmol/L    CO2 27 21 - 32 mmol/L    ANION GAP 11 mmol/L    BUN 26 (H) 5 - 25 mg/dL    Creatinine 1.64 (H) 0.60 - 1.30 mg/dL    Glucose 154 (H) 65 - 140 mg/dL    Glucose, Fasting 154 (H) 65 - 99 mg/dL    Calcium 9.2 8.4 - 10.2 mg/dL    eGFR 30 ml/min/1.73sq m   ECG 12 lead   Result Value Ref Range    Ventricular Rate 96 BPM    Atrial Rate 96 BPM    CA Interval 162 ms    QRSD Interval 90 ms    QT Interval 360 ms    QTC Interval 454 ms    P Axis 71 degrees    QRS Axis -42 degrees    T Wave Axis 33 degrees   ECG 12 lead   Result Value Ref Range    Ventricular Rate 74 BPM    Atrial Rate 74 BPM    CA Interval 180 ms    QRSD Interval 86 ms    QT Interval 412 ms    QTC Interval 457 ms    P Axis 87 degrees    QRS Axis -23 degrees    T Wave Axis 23 degrees   Fingerstick Glucose (POCT)   Result Value Ref Range    POC Glucose 281 (H) 65 - 140 mg/dl   Fingerstick Glucose (POCT)   Result Value Ref Range    POC Glucose 359 (H) 65 - 140 mg/dl   Fingerstick Glucose (POCT)   Result Value Ref Range    POC Glucose 172 (H) 65 - 140 mg/dl   Fingerstick Glucose (POCT)   Result Value Ref Range    POC Glucose 147 (H) 65 - 140 mg/dl   Fingerstick Glucose (POCT)   Result Value Ref Range    POC Glucose 234 (H) 65 - 140 mg/dl   Fingerstick Glucose (POCT)   Result Value Ref Range    POC Glucose 194 (H) 65 - 140 mg/dl   Fingerstick Glucose (POCT)   Result Value Ref Range    POC Glucose 111 65 - 140 mg/dl   Fingerstick Glucose (POCT)   Result Value Ref Range    POC Glucose 168 (H) 65 - 140 mg/dl   Fingerstick Glucose (POCT)   Result Value Ref Range    POC Glucose 143 (H) 65 - 140 mg/dl   Fingerstick Glucose (POCT)   Result Value Ref Range    POC Glucose 214 (H) 65 - 140 mg/dl   Fingerstick Glucose (POCT)   Result Value Ref Range    POC Glucose 131 65 - 140 mg/dl     Weight (last 2 days)     Date/Time Weight    09/25/23 0924 87.7 (193.4)        Body mass index is 39.06 kg/m². BP      Temp      Pulse     Resp      SpO2        Vitals:    09/25/23 0924   Weight: 87.7 kg (193 lb 6.4 oz)     Vitals:    09/25/23 0924   Weight: 87.7 kg (193 lb 6.4 oz)       /74   Pulse 72   Resp 16   Ht 4' 11" (1.499 m)   Wt 87.7 kg (193 lb 6.4 oz)   SpO2 98%   BMI 39.06 kg/m²          Physical Exam  Vitals and nursing note reviewed. Constitutional:       Appearance: She is well-developed. HENT:      Head: Normocephalic. Eyes:      General: No scleral icterus. Right eye: No discharge. Left eye: No discharge. Conjunctiva/sclera: Conjunctivae normal.      Pupils: Pupils are equal, round, and reactive to light. Cardiovascular:      Rate and Rhythm: Normal rate and regular rhythm.       Heart sounds: Normal heart sounds. No murmur heard. No friction rub. No gallop. Pulmonary:      Effort: No respiratory distress. Breath sounds: Normal breath sounds. No wheezing or rales. Abdominal:      General: Bowel sounds are normal. There is no distension. Palpations: Abdomen is soft. There is no mass. Tenderness: There is no abdominal tenderness. There is no guarding or rebound. Musculoskeletal:         General: No deformity. Cervical back: Neck supple. Lymphadenopathy:      Cervical: No cervical adenopathy. Neurological:      Mental Status: She is alert. Coordination: Coordination normal.   Psychiatric:         Mood and Affect: Mood is anxious. Mood is not depressed. Thought Content: Thought content does not include suicidal ideation.

## 2023-09-26 ENCOUNTER — TELEPHONE (OUTPATIENT)
Dept: ADMINISTRATIVE | Facility: OTHER | Age: 78
End: 2023-09-26

## 2023-09-26 NOTE — LETTER
Diabetic Eye Exam Form    Date Requested: 23  Patient: Dionna Lopez  Patient : 1945   Referring Provider: Aung Lyn DO      DIABETIC Eye Exam Date _______________________________      Type of Exam MUST be documented for Diabetic Eye Exams. Please CHECK ONE. Retinal Exam       Dilated Retinal Exam       OCT       Optomap-Iris Exam      Fundus Photography       Left Eye - Please check Retinopathy or No Retinopathy        Exam did show retinopathy    Exam did not show retinopathy       Right Eye - Please check Retinopathy or No Retinopathy       Exam did show retinopathy    Exam did not show retinopathy       Comments __________________________________________________________    Practice Providing Exam ______________________________________________    Exam Performed By (print name) _______________________________________      Provider Signature ___________________________________________________      These reports are needed for  compliance. Please fax this completed form and a copy of the Diabetic Eye Exam report to our office located at [address] [place] [address] as soon as possible via Fax [phone] ethan Cohen: Phone [phone]  We thank you for your assistance in treating our mutual patient.

## 2023-09-26 NOTE — TELEPHONE ENCOUNTER
Upon review of the In Basket request and the patient's chart, initial outreach has been made via fax to facility. Please see Contacts section for details.      Thank you  Shweta Screws

## 2023-09-26 NOTE — TELEPHONE ENCOUNTER
----- Message from Lennox Scale, Pharmacist sent at 9/25/2023  2:01 PM EDT -----  09/25/23 2:01 PM    Hello, our patient Kylie  has had Diabetic Eye Exam completed/performed. Please assist in updating the patient chart by making an External outreach to Dr. Carissa Helton office located in [place], [place]. The date of service is 2022 - also has an appointment next week in case you would like to hold off on out reach.     Thank you,  Lennox Scale, Pharmacist

## 2023-09-27 LAB
LEFT EYE DIABETIC RETINOPATHY: NORMAL
RIGHT EYE DIABETIC RETINOPATHY: NORMAL

## 2023-09-28 PROBLEM — Z13.820 SCREENING FOR OSTEOPOROSIS: Status: RESOLVED | Noted: 2023-07-30 | Resolved: 2023-09-28

## 2023-09-29 DIAGNOSIS — F41.1 GAD (GENERALIZED ANXIETY DISORDER): ICD-10-CM

## 2023-09-29 DIAGNOSIS — F41.9 ANXIETY: ICD-10-CM

## 2023-09-29 DIAGNOSIS — F41.0 PANIC ATTACKS: ICD-10-CM

## 2023-09-29 RX ORDER — ALPRAZOLAM 0.5 MG/1
0.5 TABLET ORAL 2 TIMES DAILY PRN
Qty: 60 TABLET | Refills: 0 | Status: SHIPPED | OUTPATIENT
Start: 2023-09-29

## 2023-09-29 NOTE — TELEPHONE ENCOUNTER
Reason for call:   [x] Refill   [] Prior Auth  [] Other:     Office:   [x] PCP/Provider - Dr Danyelle Medina  [] Speciality/Provider -     Medication:   Alprazolam .5 mg 1 bid prn, 30        Pharmacy: [address] [place] [address]    Does the patient have enough for 3 days?    [] Yes   [x] No - Send as HP to POD

## 2023-10-03 ENCOUNTER — SOCIAL WORK (OUTPATIENT)
Dept: BEHAVIORAL/MENTAL HEALTH CLINIC | Facility: CLINIC | Age: 78
End: 2023-10-03
Payer: COMMERCIAL

## 2023-10-03 DIAGNOSIS — F41.9 ANXIETY: Primary | ICD-10-CM

## 2023-10-03 PROCEDURE — 90837 PSYTX W PT 60 MINUTES: CPT | Performed by: SOCIAL WORKER

## 2023-10-03 NOTE — PSYCH
Behavioral Health Psychotherapy Progress Note    Psychotherapy Provided: Individual Psychotherapy     Anxiety    Goals addressed in session: Goal 1     DATA: Марина Carrillo continues to deal with significant stress related to her own health issues, family issues, some marital stress, and issues with his living current living arrangement. Utilizes session to process/ventilate regarding stressors- validation and supportive therapy provided. Primary stressors related to current living arrangement and hope she and  can leave to more suitable environment. Denies any acute issues with anxiety or depression. No SI. During this session, this clinician used the following therapeutic modalities: Solution-Focused Therapy and Supportive Psychotherapy    Substance Abuse was addressed during this session. If the client is diagnosed with a co-occurring substance use disorder, please indicate any changes in the frequency or amount of use: none. Stage of change for addressing substance use diagnoses: Action    ASSESSMENT:  Donavan Singh presents with a Euthymic/ normal mood. her affect is Normal range and intensity, which is congruent, with her mood and the content of the session. The client has made progress on their goals. Donavan Singh presents with a minimal risk of suicide, minimal risk of self-harm, and minimal risk of harm to others. For any risk assessment that surpasses a "low" rating, a safety plan must be developed. A safety plan was indicated: no  If yes, describe in detail n/a    PLAN: Between sessions, Donavan Singh will utilize stress mgmt strategies and productive outlets to manage stress and anxiety. Марина Carrillo somewhat ambivalent about seeing another therapist with my pending departure. She would be interested in meeting with my replacement at her PCP site to see if it would be good fit.  Will add her to my referral list.    [facility] and Discharge Planning: Марина Gerardo Nehemiah Appiah is aware of and agrees to continue to work on their treatment plan. They have identified and are working toward their discharge goals.  no    Visit start and stop times: 2:05-3:00    10/03/23

## 2023-10-05 NOTE — TELEPHONE ENCOUNTER
As a follow-up, a second attempt has been made for outreach via fax to facility. Please see Contacts section for details.     Thank you  Frankie Miranda

## 2023-10-07 ENCOUNTER — IMMUNIZATIONS (OUTPATIENT)
Dept: INTERNAL MEDICINE CLINIC | Facility: CLINIC | Age: 78
End: 2023-10-07
Payer: COMMERCIAL

## 2023-10-07 DIAGNOSIS — Z23 ENCOUNTER FOR IMMUNIZATION: Primary | ICD-10-CM

## 2023-10-07 PROCEDURE — 90662 IIV NO PRSV INCREASED AG IM: CPT

## 2023-10-07 PROCEDURE — G0008 ADMIN INFLUENZA VIRUS VAC: HCPCS

## 2023-10-09 RX ORDER — LABETALOL 100 MG/1
TABLET, FILM COATED ORAL
COMMUNITY
Start: 2023-10-02

## 2023-10-11 ENCOUNTER — HOSPITAL ENCOUNTER (OUTPATIENT)
Dept: ULTRASOUND IMAGING | Facility: HOSPITAL | Age: 78
Discharge: HOME | End: 2023-10-11
Payer: COMMERCIAL

## 2023-10-11 DIAGNOSIS — N20.0 KIDNEY STONE: ICD-10-CM

## 2023-10-11 PROCEDURE — 76775 US EXAM ABDO BACK WALL LIM: CPT

## 2023-10-12 ENCOUNTER — OFFICE VISIT (OUTPATIENT)
Dept: INTERNAL MEDICINE CLINIC | Facility: CLINIC | Age: 78
End: 2023-10-12
Payer: COMMERCIAL

## 2023-10-12 ENCOUNTER — CLINICAL SUPPORT (OUTPATIENT)
Dept: INTERNAL MEDICINE CLINIC | Facility: CLINIC | Age: 78
End: 2023-10-12
Payer: COMMERCIAL

## 2023-10-12 VITALS
RESPIRATION RATE: 16 BRPM | SYSTOLIC BLOOD PRESSURE: 128 MMHG | HEART RATE: 76 BPM | HEIGHT: 59 IN | DIASTOLIC BLOOD PRESSURE: 72 MMHG | OXYGEN SATURATION: 98 % | WEIGHT: 193 LBS | BODY MASS INDEX: 38.91 KG/M2

## 2023-10-12 DIAGNOSIS — Z79.4 TYPE 2 DIABETES MELLITUS WITH COMPLICATION, WITH LONG-TERM CURRENT USE OF INSULIN (HCC): Primary | Chronic | ICD-10-CM

## 2023-10-12 DIAGNOSIS — E11.8 TYPE 2 DIABETES MELLITUS WITH COMPLICATION, WITH LONG-TERM CURRENT USE OF INSULIN (HCC): Primary | Chronic | ICD-10-CM

## 2023-10-12 DIAGNOSIS — F41.9 ANXIETY: ICD-10-CM

## 2023-10-12 DIAGNOSIS — E66.9 OBESITY (BMI 30-39.9): ICD-10-CM

## 2023-10-12 PROCEDURE — 99213 OFFICE O/P EST LOW 20 MIN: CPT

## 2023-10-12 NOTE — PROGRESS NOTES
Name: Shante Person      :       MRN: [id number]  Encounter Provider: Rodrigue Pascual MD  Encounter Date: 10/12/2023   Encounter department: [facility]     1. Type 2 diabetes mellitus with complication, with long-term current use of insulin [facility])  Assessment & Plan:    Lab Results   Component Value Date    HGBA1C 9.2 (A) 2023   Patient was admitted recently for hypoglycemic episode  No hypoglycemic episode after that  he uses 34u Lantus in the afternoon and 10 u Novolog with meals. Dexcom reading today shows an average of 282. Plan  New dexcom monitor today  Increase Lantus dose to 38 u and continue 10 u novolog        2. Obesity (BMI 30-39. 9)  Assessment & Plan:  Patient is obese  Patient reports that she is hungry all the time mostly because of anxiety and depression  She does not exercise  Eats an unhealthy diet  Plan  Counseled regarding importance of weight loss,exercise and following a healthy diet      3. Anxiety  Assessment & Plan:  Patient has h/o anxiety disorder , takes Cimbalta once a day and Xanax twice a day  Have difficulty sleeping, multiple stressors at home  Weaned off [address] [place] [address] and will start Effexor 37.5 mg once daily  Plan    Start taking effexor once daily  Continue taking Xanax twice  a day                 Subjective      HPI  74-year old female coming in for a follow up office visit regarding type 2 diabetes, anxiety, depression,, obesity; the patient reports being compliant to medications. She did not have any further hypoglycemic episodes. Has been compliant with insulin at home . She uses 34u Lantus in the afternoon and 10 u Novolog with meals. Dexcom reading today shows an average of 282. She reports that she still has anxiety issues. Anxiety is more at at night ,has difficulty sleeping    Reports reason for anxiety as multiple stressors at [facility] eli moved 3 times since for past  4 months, but home situation has improved since her last visit. Currently weaning off [address] [facility] . Plan to start Effexor. Review of Systems   Constitutional:  Positive for fatigue. HENT: Negative. Eyes: Negative. Respiratory: Negative. Cardiovascular: Negative. Gastrointestinal: Negative. Endocrine: Negative. Genitourinary: Negative. Musculoskeletal:  Positive for arthralgias. Skin: Negative. Allergic/Immunologic: Negative. Neurological: Negative. Hematological: Negative. Psychiatric/Behavioral:  Positive for decreased concentration and sleep disturbance. The patient is nervous/anxious.         Current Outpatient Medications on File Prior to Visit   Medication Sig    ALPRAZolam (XANAX) 0.5 mg tablet Take 1 tablet (0.5 mg total) by mouth 2 (two) times a day as needed for anxiety    aspirin 81 MG tablet Take 81 mg by mouth daily    B-D UF III MINI PEN NEEDLES 31G X 5 MM MISC     BAQSIMI TWO PACK 3 MG/DOSE POWD Use as directed     BD Insulin Syringe U/F 31G X 5/16" 0.5 ML MISC 4 (four) times a day As directed    cholecalciferol (VITAMIN D3) 1,000 units tablet Take 2,000 Units by mouth daily    Coenzyme Q10 (Co Q-10) 200 MG CAPS Take by mouth in the morning    Continuous Blood Gluc Sensor (Dexcom G6 Sensor) MISC Use 1 each    diclofenac sodium (VOLTAREN) 1 % Apply 2 g topically 4 (four) times a day (Patient taking differently: Apply 2 g topically if needed)    diltiazem (TIAZAC) 300 MG 24 hr capsule Take 300 mg by mouth daily    Docusate Sodium (DSS) 100 MG CAPS Take 1 capsule by mouth every 12 (twelve) hours    DULoxetine (Cymbalta) 30 mg delayed release capsule Take 1 capsule (30 mg total) by mouth daily    DULoxetine (CYMBALTA) 60 mg delayed release capsule TAKE 1 CAPSULE BY MOUTH EVERY DAY    isosorbide mononitrate (IMDUR) 60 mg 24 hr tablet TAKE 1.5 TABs AT NIGHT    labetalol (NORMODYNE) 100 mg tablet TAKE 1 TABLET BY MOUTH DAILY AFTER LUNCH    levothyroxine 50 mcg tablet Take 50 mcg by mouth daily    nitroglycerin (NITROLINGUAL) 0.4 mg/spray spray     NovoLOG 100 UNIT/ML SOLN 10 units with meals    rosuvastatin (CRESTOR) 20 MG tablet Take 20 mg by mouth every evening      Toujeo SoloStar 300 units/mL CONCENTRATED U-300 injection pen (1-unit dial) 30 Units in the morning    venlafaxine (EFFEXOR-XR) 37.5 mg 24 hr capsule Take 1 capsule (37.5 mg total) by mouth daily with breakfast    irbesartan (AVAPRO) 300 mg tablet Take 300 mg by mouth       Objective     /72   Pulse 76   Resp 16   Ht 4' 11" (1.499 m)   Wt 87.5 kg (193 lb)   SpO2 98%   BMI 38.98 kg/m²     Physical Exam  Constitutional:       Appearance: She is obese. HENT:      Head: Normocephalic. Right Ear: Tympanic membrane, ear canal and external ear normal.      Left Ear: Tympanic membrane, ear canal and external ear normal.      Mouth/Throat:      Mouth: Mucous membranes are moist.      Pharynx: Oropharynx is clear. Eyes:      Extraocular Movements: Extraocular movements intact. Pupils: Pupils are equal, round, and reactive to light. Cardiovascular:      Rate and Rhythm: Normal rate and regular rhythm. Pulmonary:      Effort: Pulmonary effort is normal.      Breath sounds: Normal breath sounds. Abdominal:      General: Abdomen is flat. Musculoskeletal:         General: Swelling present. Normal range of motion. Cervical back: Normal range of motion. Skin:     General: Skin is warm. Capillary Refill: Capillary refill takes less than 2 seconds. Neurological:      General: No focal deficit present. Mental Status: She is alert and oriented to person, place, and time.        Nichelle Isaac MD

## 2023-10-13 PROBLEM — E66.9 OBESITY (BMI 30-39.9): Status: ACTIVE | Noted: 2023-10-13

## 2023-10-13 NOTE — PROGRESS NOTES
[address] [facility], Pharmacist       ASSESSMENT/PLAN                                                                                     Following PCP appointment but before PharmD came in for follow-up, patient applied Dexcom G6 sensor but removed from body as she was concerned she put sensor on backwards. As patient did not have another Dexcom sensor, encouraged patient to contact Dexcom go get replacement sensor. Patient will increase insulin doses per PCP and will follow-up with endo next week as scheduled    Follow-Up: consider PharmD follow-up in between endo appointments; patient would like to schedule PharmD follow-up for dexcom placement      SUBJECTIVE                                                                                                            Medication Adherence: Medication list reviewed with patient, reports the following discrepancies/problems:  ALPRAZolam  aspirin  B-D UF III MINI PEN NEEDLES Misc  Baqsimi Two Pack Powd  BD Insulin Syringe U/F Misc  cholecalciferol  Co Q-10 Caps  Dexcom G6 Sensor Misc  diclofenac sodium  diltiazem  DSS Caps  DULoxetine  isosorbide mononitrate  labetalol  levothyroxine  nitroglycerin  NovoLOG Soln  rosuvastatin  Toujeo SoloStar Sopn  venlafaxine    Misses doses:  no    Brought in Dexcom G6 sensor for placement, has not had sensor on for a couple of days as she had an issue placing sensor at home and only had one sensor left.      2. Medication Efficacy:    Hypoglycemia history: 0 readings < 70mg/dL since last appointment       OBJECTIVE                                                                                                      CGM scanned into chart      Eye Exam:    Lab Results   Component Value Date    LEFTDIABRET None 09/23/2020    RIGHTDIABRET None 09/23/2020       Lab Results   Component Value Date    SODIUM 139 09/02/2023    K 4.2 09/02/2023    EGFR 30 09/02/2023    CREATININE 1.64 (H) 09/02/2023 GLUF 154 (H) 09/02/2023    ESWSIYDI04 262 09/29/2016    MICROALBCRE 117.5 05/10/2023       Lab Results   Component Value Date    HGBA1C 9.2 (A) 09/25/2023    HGBA1C 10.4 (H) 06/13/2023    HGBA1C 8.6 (H) 09/02/2022       Pharmacist Tracking Tool  Reason For Outreach: Embedded Pharmacist  Demographics:  Intervention Method:  In Person  Type of Intervention: Follow-Up  Topics Addressed: Diabetes  Pharmacologic Interventions: Dose or Frequency Adjusted  Non-Pharmacologic Interventions: Personal CGM  Time:  Direct Patient Care:  15  mins   Care Coordination:  15  mins  Recommendation Recipient: Patient/Caregiver  Outcome: Accepted

## 2023-10-13 NOTE — ASSESSMENT & PLAN NOTE
Lab Results   Component Value Date    HGBA1C 9.2 (A) 09/25/2023   Patient was admitted recently for hypoglycemic episode  No hypoglycemic episode after that  he uses 34u Lantus in the afternoon and 10 u Novolog with meals. Dexcom reading today shows an average of 282.   Plan  New dexcom monitor today  Increase Lantus dose to 38 u and continue 10 u novolog

## 2023-10-13 NOTE — ASSESSMENT & PLAN NOTE
Patient is obese  Patient reports that she is hungry all the time mostly because of anxiety and depression  She does not exercise  Eats an unhealthy diet  Plan  Counseled regarding importance of weight loss,exercise and following a healthy diet

## 2023-10-19 DIAGNOSIS — F41.9 ANXIETY: ICD-10-CM

## 2023-10-19 RX ORDER — VENLAFAXINE HYDROCHLORIDE 37.5 MG/1
CAPSULE, EXTENDED RELEASE ORAL
Qty: 90 CAPSULE | Refills: 1 | Status: SHIPPED | OUTPATIENT
Start: 2023-10-19

## 2023-10-19 RX ORDER — DULOXETIN HYDROCHLORIDE 30 MG/1
30 CAPSULE, DELAYED RELEASE ORAL DAILY
Qty: 90 CAPSULE | Refills: 1 | Status: SHIPPED | OUTPATIENT
Start: 2023-10-19

## 2023-10-31 ENCOUNTER — TELEPHONE (OUTPATIENT)
Age: 78
End: 2023-10-31

## 2023-10-31 NOTE — TELEPHONE ENCOUNTER
Pt called in with concerns with her appt on 11/7 stated that she has another appt on Monday but they mentioned that they wanted to see her the following day on Tuesday 11/07 at 3:15 which is crashing with her appt with Dr. Ori Payan at 3:20 wanted to see if he can try to squeeze her in later that day for a visit. Please advise thank you.

## 2023-11-01 ENCOUNTER — DOCUMENTATION (OUTPATIENT)
Dept: PSYCHIATRY | Facility: CLINIC | Age: 78
End: 2023-11-01

## 2023-11-01 NOTE — TELEPHONE ENCOUNTER
Upon review of the In Basket request we were able to locate, review, and update the patient chart as requested for Diabetic Eye Exam.    Any additional questions or concerns should be emailed to the Practice Liaisons via the appropriate education email address, please do not reply via In Basket.     Thank you  Natanael Garcia

## 2023-11-01 NOTE — PROGRESS NOTES
Psychotherapy Discharge Summary    Preferred Name: Luciana Lima  YOB: 1945    Admission date to psychotherapy: 12/21/16    Referred by: PCP    Presenting Problem: Mood issues    Course of treatment included : individual therapy     Progress/Outcome of Treatment Goals (brief summary of course of treatment) Seen for 39 sessions the last being on 10/3/23. Dawit Dolan continues to deal with long-standing marital and family issues that impact her mood. Physical health struggles contribute as well. When last seen, she was stable. Given my departure, she has been added to the referral list for my replacement at her PCP office when hired. During our sessions, I referred her to psychiatry and for more intensive therapy services but eventually she did not follow through. Treatment Complications (if any): none    Treatment Progress: fair    Current [facility] Psychiatric Provider: none    Discharge Medications include: see PCP notes    Discharge Date: 11/1/23    Discharge Diagnosis:  Anxiety    Criteria for Discharge:  referred to my replacement at her PCP office when hired    Aftercare recommendations include (include specific referral names and phone numbers, if appropriate): none    Prognosis: fair

## 2023-11-01 NOTE — TELEPHONE ENCOUNTER
12-May-2022 15:24 As a final attempt, a third outreach has been made via telephone call to facility. Please see Contacts section for details. This encounter will be closed and completed by end of day. Should we receive the requested information because of previous outreach attempts, the requested patient's chart will be updated appropriately.      Thank you  Joanne Moscoso 12-May-2022 05:05 12-May-2022 12:53

## 2023-11-07 ENCOUNTER — OFFICE VISIT (OUTPATIENT)
Dept: INTERNAL MEDICINE CLINIC | Facility: CLINIC | Age: 78
End: 2023-11-07
Payer: COMMERCIAL

## 2023-11-07 VITALS
SYSTOLIC BLOOD PRESSURE: 118 MMHG | RESPIRATION RATE: 16 BRPM | BODY MASS INDEX: 38.63 KG/M2 | DIASTOLIC BLOOD PRESSURE: 76 MMHG | OXYGEN SATURATION: 98 % | HEART RATE: 75 BPM | HEIGHT: 59 IN | WEIGHT: 191.6 LBS

## 2023-11-07 DIAGNOSIS — E11.8 TYPE 2 DIABETES MELLITUS WITH COMPLICATION, WITH LONG-TERM CURRENT USE OF INSULIN (HCC): Chronic | ICD-10-CM

## 2023-11-07 DIAGNOSIS — E66.01 CLASS 3 SEVERE OBESITY DUE TO EXCESS CALORIES WITHOUT SERIOUS COMORBIDITY IN ADULT, UNSPECIFIED BMI (HCC): ICD-10-CM

## 2023-11-07 DIAGNOSIS — F41.9 ANXIETY: Primary | ICD-10-CM

## 2023-11-07 DIAGNOSIS — F33.2 MDD (MAJOR DEPRESSIVE DISORDER), RECURRENT SEVERE, WITHOUT PSYCHOSIS (HCC): ICD-10-CM

## 2023-11-07 DIAGNOSIS — N18.4 STAGE 4 CHRONIC KIDNEY DISEASE (HCC): ICD-10-CM

## 2023-11-07 DIAGNOSIS — Z79.4 TYPE 2 DIABETES MELLITUS WITH COMPLICATION, WITH LONG-TERM CURRENT USE OF INSULIN (HCC): Chronic | ICD-10-CM

## 2023-11-07 DIAGNOSIS — E78.2 MIXED HYPERLIPIDEMIA: Chronic | ICD-10-CM

## 2023-11-07 PROCEDURE — 99214 OFFICE O/P EST MOD 30 MIN: CPT | Performed by: INTERNAL MEDICINE

## 2023-11-07 RX ORDER — VENLAFAXINE HYDROCHLORIDE 75 MG/1
75 CAPSULE, EXTENDED RELEASE ORAL
Qty: 30 CAPSULE | Refills: 5 | Status: SHIPPED | OUTPATIENT
Start: 2023-11-07

## 2023-11-07 RX ORDER — TAMSULOSIN HYDROCHLORIDE 0.4 MG/1
CAPSULE ORAL
COMMUNITY
End: 2023-11-07

## 2023-11-07 NOTE — PROGRESS NOTES
Assessment/Plan:    Type 2 diabetes mellitus with complication, with long-term current use of insulin (HCC)    Lab Results   Component Value Date    HGBA1C 11.8 (H) 11/06/2023   I have counselled the pt to follow a healthy and balanced diet ,and recommend routine exercise. Target A1c under 8 working with endocrinologist Dr. Sarah Yu will be getting the Dexcom 7 no further hypoglycemia at this point recommend a healthy and balanced diet reducing carbohydrates and sweets    Stage 4 chronic kidney disease ([address])  Lab Results   Component Value Date    EGFR 30 09/02/2023    EGFR 26 09/01/2023    EGFR 25 08/31/2023    CREATININE 1.64 (H) 09/02/2023    CREATININE 1.85 (H) 09/01/2023    CREATININE 1.86 (H) 08/31/2023   Drink adequate amount of water, avoid anti-inflammatories, reduce sodium in the diet and will continue to monitor the kidney function    MDD (major depressive disorder), recurrent severe, without psychosis ([address])  Anxiety/depression no suicidal ideation recommend the patient go for counseling we will increase Effexor to 75 mg once daily    Hyperlipidemia  Hyperlipidemia controlled continue with current medical regiment recommend a low-cholesterol diet and recommend routine exercise we will continue to monitor the progress. Continue Crestor 20 mg once daily    Class 3 severe obesity due to excess calories without serious comorbidity in adult, unspecified BMI ([address])  Obesity -I have counseled patient following healthy and balanced diet, I would like the patient to lose weight, I would like the patient exercise routinely; we will continue monitor the patient's progress. Problem List Items Addressed This Visit        Endocrine    Type 2 diabetes mellitus with complication, with long-term current use of insulin (HCC) (Chronic)       Lab Results   Component Value Date    HGBA1C 11.8 (H) 11/06/2023   I have counselled the pt to follow a healthy and balanced diet ,and recommend routine exercise.   Target A1c under 8 working with endocrinologist Dr. Violette Al will be getting the Dexcom 7 no further hypoglycemia at this point recommend a healthy and balanced diet reducing carbohydrates and sweets            Genitourinary    Stage 4 chronic kidney disease ([address])     Lab Results   Component Value Date    EGFR 30 09/02/2023    EGFR 26 09/01/2023    EGFR 25 08/31/2023    CREATININE 1.64 (H) 09/02/2023    CREATININE 1.85 (H) 09/01/2023    CREATININE 1.86 (H) 08/31/2023   Drink adequate amount of water, avoid anti-inflammatories, reduce sodium in the diet and will continue to monitor the kidney function            Other    Hyperlipidemia (Chronic)     Hyperlipidemia controlled continue with current medical regiment recommend a low-cholesterol diet and recommend routine exercise we will continue to monitor the progress. Continue Crestor 20 mg once daily         MDD (major depressive disorder), recurrent severe, without psychosis ([address])     Anxiety/depression no suicidal ideation recommend the patient go for counseling we will increase Effexor to 75 mg once daily         Relevant Medications    venlafaxine (EFFEXOR-XR) 75 mg 24 hr capsule    Class 3 severe obesity due to excess calories without serious comorbidity in adult, unspecified BMI ([address])     Obesity -I have counseled patient following healthy and balanced diet, I would like the patient to lose weight, I would like the patient exercise routinely; we will continue monitor the patient's progress. Anxiety - Primary    Relevant Medications    venlafaxine (EFFEXOR-XR) 75 mg 24 hr capsule      Return to office  3 months  call if any problems    Subjective:      Patient ID: Shorty Rodriguez is a 69 y.o. female.     HPI 74-year old female coming in for a follow up office visit regarding anxiety, stage IV chronic kidney disease, type 2 diabetes, major depressive disorder, hyperlipidemia and obesity; the patient reports me compliant taking medications without untoward side effects the. The patient is here to review his medical condition, update me on the medical condition and the patient reports me no hospitalizations and no ER visits. No further low sugars working with endocrinologist will be getting the Dexcom 7; tolerating the Effexor she is now on effexor xr ,  blood sugar 300-400; anxiety no si. Patient reports me ongoing anxiety regarding multiple problems including daughter who is coming to house to live with her also granddaughter who is suicidal working with counselor,  is very irritable, does not like where she is living. Patient reports me she is aware    The following portions of the patient's history were reviewed and updated as appropriate: allergies, current medications, past family history, past medical history, past social history, past surgical history and problem list.    Review of Systems   Constitutional:  Negative for activity change, appetite change and unexpected weight change. HENT:  Negative for congestion and postnasal drip. Eyes:  Negative for visual disturbance. Respiratory:  Negative for cough and shortness of breath. Cardiovascular:  Negative for chest pain. Gastrointestinal:  Negative for abdominal pain, diarrhea, nausea and vomiting. Neurological:  Negative for dizziness, light-headedness and headaches. Hematological:  Negative for adenopathy. Psychiatric/Behavioral:  Negative for suicidal ideas. The patient is nervous/anxious. Objective:    Return in about 3 months (around 2/7/2024). Procedure: US kidney and bladder    Result Date: 10/20/2023  Narrative: RENAL ULTRASOUND INDICATION:   N20.0: Calculus of kidney. COMPARISON: 8/31/2023 TECHNIQUE:   Ultrasound of the retroperitoneum was performed with a curvilinear transducer utilizing volumetric sweeps and still imaging techniques. FINDINGS: KIDNEYS: Symmetric and normal size. Right kidney:  10.8 x 4.9 x 4.1 cm. Volume 112.9 mL Left kidney:  11.1 x 6.1 x 4.9 cm.   Volume 173.7 mL Right kidney Normal echogenicity and contour. Mild cortical thinning. No mass is identified. 1.4 x 1.4 x 1.7 cm lower pole simple cyst is again seen without significant interval change. Additional 9 x 8 x 11 mm lateral midpole simple cyst is also seen, not definitively visualized on the prior study. No hydronephrosis. No shadowing calculi. No perinephric fluid collections. Left kidney Normal echogenicity and contour. Mild cortical thinning. No mass is identified. 1.6 x 1.2 x 1.5 cm lower pole and 1.3 x 1.5 x 1.8 cm midpole simple cysts are again seen, not significantly changed compared to the prior study. 8 x 7 x 8 mm lower pole simple cyst also noted, not definitively visualized on the prior study. No hydronephrosis. No shadowing calculi. No perinephric fluid collections. URETERS: Nonvisualized. BLADDER: Normally distended. No focal thickening or mass lesions. Bilateral ureteral jets detected. Impression: Multiple small bilateral renal cysts are again seen. No suspicious solid renal mass, nephrolithiasis, perinephric fluid collection, or hydronephrosis. Mild cortical thinning again noted bilaterally.  Workstation performed: [id number]        Allergies   Allergen Reactions   • Molds & Smuts Allergic Rhinitis   • Other Allergic Rhinitis     RAGWEED, CAT DANDER, DOG DANDER    • Pollen Extract Other (See Comments)     Cold symptoms         Past Medical History:   Diagnosis Date   • Acute embolism and thrombosis of unspecified deep veins of unspecified lower extremity (HCC)     Last Assessed:  5/18/17   • Anemia    • Anosmia    • Anxiety    • Arthritis    • Asthma    • Back pain    • Bilateral macular retinal edema    • CAD (coronary artery disease)    • Cataract    • Cervical disc herniation    • Cervical radiculopathy    • Cervical spinal stenosis    • Cervical spondylolysis    • Chronic kidney disease    • Chronic mastoiditis    • Colon polyp    • Complex endometrial hyperplasia    • Depression    • Diabetes mellitus ([address])    • Disease of thyroid gland    • DVT (deep venous thrombosis) ([facility])    • DVT (deep venous thrombosis) ([address]) 6/16/2017   • Fibromyalgia    • Fibromyalgia, primary    • Hyperlipidemia    • Hypertension    • Hypothyroid    • Kidney stone    • Lumbar radiculopathy    • Neck pain    • Obese    • PONV (postoperative nausea and vomiting)    • Shingles 07/01/2021   • Spinal stenosis    • Stomach ulcer    • Thyroid disease      Past Surgical History:   Procedure Laterality Date   • BACK SURGERY     • CARPAL TUNNEL RELEASE     • CATARACT EXTRACTION     • CHOLECYSTECTOMY     • COLONOSCOPY     • CORONARY ANGIOPLASTY     • CORONARY ARTERY BYPASS GRAFT     • CYSTOSCOPY N/A 6/20/2017    Procedure: CYSTOSCOPY;  Surgeon: Ann-Marie Luna MD;  Location: [facility];  Service: Gynecology Oncology   • CYSTOSCOPY     • SD LAPS TOTAL HYSTERECT 250 GM/< W/RMVL TUBE/OVARY N/A 6/20/2017    Procedure: ROBOTIC HYSTERECTOMY; BILATERAL SALPINO-OOPHERECTOMY; umbilical hernia repair.;  Surgeon: Ann-Marie Luna MD;  Location: [facility];  Service: Gynecology Oncology   • SD REPAIR FIRST ABDOMINAL WALL HERNIA N/A 5/12/2022    Procedure: REPAIR HERNIA INCISIONAL;  Surgeon: Barbi Victoria DO;  Location: [facility];  Service: General   • TONSILECTOMY AND ADNOIDECTOMY     • TONSILLECTOMY     • UMBILICAL HERNIA REPAIR       Current Outpatient Medications on File Prior to Visit   Medication Sig Dispense Refill   • ALPRAZolam (XANAX) 0.5 mg tablet Take 1 tablet (0.5 mg total) by mouth 2 (two) times a day as needed for anxiety 60 tablet 0   • aspirin 81 MG tablet Take 81 mg by mouth daily     • B-D UF III MINI PEN NEEDLES 31G X 5 MM MISC      • BAQSIMI TWO PACK 3 MG/DOSE POWD Use as directed   0   • BD Insulin Syringe U/F 31G X 5/16" 0.5 ML MISC 4 (four) times a day As directed     • cholecalciferol (VITAMIN D3) 1,000 units tablet Take 2,000 Units by mouth daily     • Coenzyme Q10 (Co Q-10) 200 MG CAPS Take by mouth in the morning • Continuous Blood Gluc Sensor (Dexcom G6 Sensor) MISC Use 1 each     • diclofenac sodium (VOLTAREN) 1 % Apply 2 g topically 4 (four) times a day (Patient taking differently: Apply 2 g topically if needed) 1 Tube 0   • diltiazem (TIAZAC) 300 MG 24 hr capsule Take 300 mg by mouth daily     • Docusate Sodium (DSS) 100 MG CAPS Take 1 capsule by mouth every 12 (twelve) hours     • DULoxetine (CYMBALTA) 30 mg delayed release capsule TAKE 1 CAPSULE BY MOUTH EVERY DAY 90 capsule 1   • isosorbide mononitrate (IMDUR) 60 mg 24 hr tablet TAKE 1.5 TABs AT NIGHT     • labetalol (NORMODYNE) 100 mg tablet TAKE 1 TABLET BY MOUTH DAILY AFTER LUNCH     • levothyroxine 50 mcg tablet Take 50 mcg by mouth daily     • nitroglycerin (NITROLINGUAL) 0.4 mg/spray spray   1   • NovoLOG 100 UNIT/ML SOLN 10 units with meals     • rosuvastatin (CRESTOR) 20 MG tablet Take 20 mg by mouth every evening    2   • Toujeo SoloStar 300 units/mL CONCENTRATED U-300 injection pen (1-unit dial) 30 Units in the morning     • venlafaxine (EFFEXOR-XR) 37.5 mg 24 hr capsule TAKE 1 CAPSULE BY MOUTH DAILY WITH BREAKFAST. 90 capsule 1   • [DISCONTINUED] DULoxetine (CYMBALTA) 60 mg delayed release capsule TAKE 1 CAPSULE BY MOUTH EVERY DAY 90 capsule 3   • irbesartan (AVAPRO) 300 mg tablet Take 300 mg by mouth     • [DISCONTINUED] tamsulosin (FLOMAX) 0.4 mg TAKE 1 CAPSULE (0.4 MG TOTAL) BY MOUTH DAILY WITH DINNER DO NOT START BEFORE AUGUST 27, 2023. (Patient not taking: Reported on 11/7/2023)       No current facility-administered medications on file prior to visit.      Family History   Problem Relation Age of Onset   • Arthritis Mother    • Leukemia Mother    • Other Mother         Anxiety, major depressive disorder, recurrent episode with atypical features   • Coronary artery disease Father         Heart problem   • Diabetes Father    • Other Father         Infectious disease   • Alzheimer's disease Maternal Grandmother    • Other Maternal Grandfather Heart problem   • Other Daughter         Anxiety, major depressive disorder, recurrent episode with atypical features   • Alcohol abuse Other         Grandparent   • Cancer Family    • Diabetes Family    • Hypertension Family    • Other Family         Reported prior back trouble, thyroid disorder     Social History     Socioeconomic History   • Marital status: /Civil Union     Spouse name: Not on file   • Number of children: 2   • Years of education: High school or GED   • Highest education level: Not on file   Occupational History   • Occupation: Retired   Tobacco Use   • Smoking status: Former     Packs/day: 1.00     Years: 6.00     Total pack years: 6.00     Types: Cigarettes     Quit date:      Years since quittin.8   • Smokeless tobacco: Never   • Tobacco comments:     Smoked about a half a pack for about 4 yrs. Quite about 50 yrs ago. Vaping Use   • Vaping Use: Never used   Substance and Sexual Activity   • Alcohol use: No   • Drug use: No   • Sexual activity: Not Currently     Comment: No known STD risk factors   Other Topics Concern   • Not on file   Social History Narrative    Lives independently with spouse    No known risk factors    Denied:  Exercising regularly     Social Determinants of Health     Financial Resource Strain: Low Risk  (2022)    Overall Financial Resource Strain (CARDIA)    • Difficulty of Paying Living Expenses: Not hard at all   Food Insecurity: Not on file   Transportation Needs: No Transportation Needs (2022)    PRAPARE - Transportation    • Lack of Transportation (Medical): No    • Lack of Transportation (Non-Medical):  No   Physical Activity: Not on file   Stress: Not on file   Social Connections: Not on file   Intimate Partner Violence: Not on file   Housing Stability: Not on file     Vitals:    23 1121   BP: 118/76   Pulse: 75   Resp: 16   SpO2: 98%   Weight: 86.9 kg (191 lb 9.6 oz)   Height: 4' 11" (1.499 m)     Results for orders placed or performed in visit on 09/26/23    Diabetes Eye Exam   Result Value Ref Range    Right Eye Diabetic Retinopathy None     Left Eye Diabetic Retinopathy None      Weight (last 2 days)     Date/Time Weight    11/07/23 1121 86.9 (191.6)        Body mass index is 38.7 kg/m². BP      Temp      Pulse     Resp      SpO2        Vitals:    11/07/23 1121   Weight: 86.9 kg (191 lb 9.6 oz)     Vitals:    11/07/23 1121   Weight: 86.9 kg (191 lb 9.6 oz)       /76   Pulse 75   Resp 16   Ht 4' 11" (1.499 m)   Wt 86.9 kg (191 lb 9.6 oz)   SpO2 98%   BMI 38.70 kg/m²          Physical Exam  Vitals and nursing note reviewed. Constitutional:       General: She is not in acute distress. Appearance: Normal appearance. She is well-developed. She is obese. She is not ill-appearing, toxic-appearing or diaphoretic. HENT:      Head: Normocephalic. Eyes:      General: No scleral icterus. Right eye: No discharge. Left eye: No discharge. Conjunctiva/sclera: Conjunctivae normal.      Pupils: Pupils are equal, round, and reactive to light. Cardiovascular:      Rate and Rhythm: Normal rate and regular rhythm. Heart sounds: Normal heart sounds. No murmur heard. No friction rub. No gallop. Pulmonary:      Effort: No respiratory distress. Breath sounds: Normal breath sounds. No wheezing or rales. Abdominal:      General: Bowel sounds are normal. There is no distension. Palpations: Abdomen is soft. There is no mass. Tenderness: There is no abdominal tenderness. There is no guarding or rebound. Musculoskeletal:         General: No deformity. Cervical back: Neck supple. Lymphadenopathy:      Cervical: No cervical adenopathy. Neurological:      Mental Status: She is alert. Coordination: Coordination normal.   Psychiatric:         Mood and Affect: Mood is anxious and depressed. Thought Content: Thought content does not include suicidal ideation.

## 2023-11-08 NOTE — ASSESSMENT & PLAN NOTE
Lab Results   Component Value Date    HGBA1C 11.8 (H) 11/06/2023   I have counselled the pt to follow a healthy and balanced diet ,and recommend routine exercise.   Target A1c under 8 working with endocrinologist Dr. Army Long will be getting the Dexcom 7 no further hypoglycemia at this point recommend a healthy and balanced diet reducing carbohydrates and sweets

## 2023-11-08 NOTE — ASSESSMENT & PLAN NOTE
Lab Results   Component Value Date    EGFR 30 09/02/2023    EGFR 26 09/01/2023    EGFR 25 08/31/2023    CREATININE 1.64 (H) 09/02/2023    CREATININE 1.85 (H) 09/01/2023    CREATININE 1.86 (H) 08/31/2023   Drink adequate amount of water, avoid anti-inflammatories, reduce sodium in the diet and will continue to monitor the kidney function

## 2023-11-08 NOTE — ASSESSMENT & PLAN NOTE
Anxiety/depression no suicidal ideation recommend the patient go for counseling we will increase Effexor to 75 mg once daily

## 2023-11-13 DIAGNOSIS — F41.0 PANIC ATTACKS: ICD-10-CM

## 2023-11-13 DIAGNOSIS — F41.9 ANXIETY: ICD-10-CM

## 2023-11-13 DIAGNOSIS — F41.1 GAD (GENERALIZED ANXIETY DISORDER): ICD-10-CM

## 2023-11-13 RX ORDER — ALPRAZOLAM 0.5 MG/1
0.5 TABLET ORAL 2 TIMES DAILY PRN
Qty: 60 TABLET | Refills: 0 | Status: SHIPPED | OUTPATIENT
Start: 2023-11-13

## 2023-11-13 NOTE — TELEPHONE ENCOUNTER
Medication Refill Request     Name Alprazolam   Dose/Frequency 0.5mg 2xday PRN  Quantity 60  Verified pharmacy   [x]  Verified ordering Provider   [x]  Does patient have enough for the next 3 days?  Yes [] No [x]

## 2023-11-14 ENCOUNTER — HOSPITAL ENCOUNTER (EMERGENCY)
Facility: HOSPITAL | Age: 78
Discharge: HOME | End: 2023-11-14
Attending: EMERGENCY MEDICINE
Payer: COMMERCIAL

## 2023-11-14 VITALS
DIASTOLIC BLOOD PRESSURE: 81 MMHG | SYSTOLIC BLOOD PRESSURE: 186 MMHG | TEMPERATURE: 98.4 F | WEIGHT: 190.26 LBS | HEART RATE: 77 BPM | BODY MASS INDEX: 38.43 KG/M2 | OXYGEN SATURATION: 97 % | RESPIRATION RATE: 18 BRPM

## 2023-11-14 DIAGNOSIS — Z79.899 MEDICATION COURSE CHANGED: ICD-10-CM

## 2023-11-14 DIAGNOSIS — F41.9 ANXIETY: Primary | ICD-10-CM

## 2023-11-14 LAB
ALBUMIN SERPL BCP-MCNC: 4.3 G/DL (ref 3.5–5)
ALP SERPL-CCNC: 119 U/L (ref 34–104)
ALT SERPL W P-5'-P-CCNC: 30 U/L (ref 7–52)
ANION GAP SERPL CALCULATED.3IONS-SCNC: 12 MMOL/L
AST SERPL W P-5'-P-CCNC: 41 U/L (ref 13–39)
BASOPHILS # BLD AUTO: 0.05 THOUSANDS/ÂΜL (ref 0–0.1)
BASOPHILS NFR BLD AUTO: 1 % (ref 0–1)
BILIRUB SERPL-MCNC: 0.73 MG/DL (ref 0.2–1)
BUN SERPL-MCNC: 20 MG/DL (ref 5–25)
CALCIUM SERPL-MCNC: 10 MG/DL (ref 8.4–10.2)
CHLORIDE SERPL-SCNC: 102 MMOL/L (ref 96–108)
CO2 SERPL-SCNC: 22 MMOL/L (ref 21–32)
CREAT SERPL-MCNC: 1.54 MG/DL (ref 0.6–1.3)
EOSINOPHIL # BLD AUTO: 0.02 THOUSAND/ÂΜL (ref 0–0.61)
EOSINOPHIL NFR BLD AUTO: 0 % (ref 0–6)
ERYTHROCYTE [DISTWIDTH] IN BLOOD BY AUTOMATED COUNT: 14.5 % (ref 11.6–15.1)
GFR SERPL CREATININE-BSD FRML MDRD: 32 ML/MIN/1.73SQ M
GLUCOSE SERPL-MCNC: 191 MG/DL (ref 65–140)
GLUCOSE SERPL-MCNC: 219 MG/DL (ref 65–140)
HCT VFR BLD AUTO: 39.5 % (ref 34.8–46.1)
HGB BLD-MCNC: 12.9 G/DL (ref 11.5–15.4)
IMM GRANULOCYTES # BLD AUTO: 0.05 THOUSAND/UL (ref 0–0.2)
IMM GRANULOCYTES NFR BLD AUTO: 1 % (ref 0–2)
LYMPHOCYTES # BLD AUTO: 2.81 THOUSANDS/ÂΜL (ref 0.6–4.47)
LYMPHOCYTES NFR BLD AUTO: 28 % (ref 14–44)
MCH RBC QN AUTO: 28.1 PG (ref 26.8–34.3)
MCHC RBC AUTO-ENTMCNC: 32.7 G/DL (ref 31.4–37.4)
MCV RBC AUTO: 86 FL (ref 82–98)
MONOCYTES # BLD AUTO: 0.71 THOUSAND/ÂΜL (ref 0.17–1.22)
MONOCYTES NFR BLD AUTO: 7 % (ref 4–12)
NEUTROPHILS # BLD AUTO: 6.58 THOUSANDS/ÂΜL (ref 1.85–7.62)
NEUTS SEG NFR BLD AUTO: 63 % (ref 43–75)
NRBC BLD AUTO-RTO: 0 /100 WBCS
PLATELET # BLD AUTO: 255 THOUSANDS/UL (ref 149–390)
PMV BLD AUTO: 11.7 FL (ref 8.9–12.7)
POTASSIUM SERPL-SCNC: 4.5 MMOL/L (ref 3.5–5.3)
PROT SERPL-MCNC: 8.3 G/DL (ref 6.4–8.4)
RBC # BLD AUTO: 4.59 MILLION/UL (ref 3.81–5.12)
SODIUM SERPL-SCNC: 136 MMOL/L (ref 135–147)
TSH SERPL DL<=0.05 MIU/L-ACNC: 3 UIU/ML (ref 0.45–4.5)
WBC # BLD AUTO: 10.22 THOUSAND/UL (ref 4.31–10.16)

## 2023-11-14 PROCEDURE — 80053 COMPREHEN METABOLIC PANEL: CPT

## 2023-11-14 PROCEDURE — 82948 REAGENT STRIP/BLOOD GLUCOSE: CPT

## 2023-11-14 PROCEDURE — 36415 COLL VENOUS BLD VENIPUNCTURE: CPT

## 2023-11-14 PROCEDURE — 84443 ASSAY THYROID STIM HORMONE: CPT

## 2023-11-14 PROCEDURE — 85025 COMPLETE CBC W/AUTO DIFF WBC: CPT

## 2023-11-14 PROCEDURE — 99284 EMERGENCY DEPT VISIT MOD MDM: CPT

## 2023-11-14 PROCEDURE — 99284 EMERGENCY DEPT VISIT MOD MDM: CPT | Performed by: EMERGENCY MEDICINE

## 2023-11-14 RX ORDER — ALPRAZOLAM 0.25 MG/1
0.5 TABLET ORAL ONCE
Status: COMPLETED | OUTPATIENT
Start: 2023-11-14 | End: 2023-11-14

## 2023-11-14 RX ADMIN — ALPRAZOLAM 0.5 MG: 0.25 TABLET ORAL at 14:22

## 2023-11-14 NOTE — ED PROVIDER NOTES
History  Chief Complaint   Patient presents with    Altered Mental Status     Pt reports starting a higher dose of Effexor yesterday and with that has had confusion. Pt states she was taking 5 mg and was switched to 75 mg.  with pt and confirms confusion started with Effexor dose change. 69year old Female with extensive PMH presents to the ED for confusion and anxiety following a medication adjustment. She states that her PCP recently adjusted her medications and increased both her Synthroid and Effexor. She states that she has not had any of her usual Xanax for about a week and is feeling anxious. She and her  also mention several new stressors in her life recently such as moving, some renovations at home, and her psychiatrist being promoted which means she needs a new one and has yet to see another psychiatrist. Denies chest pain, SOB, cough, abdominal pain, n/v/d, fever, chills, dizziness, lightheadedness, HA, dysuria, hematuria, hematochezia, or melena. Prior to Admission Medications   Prescriptions Last Dose Informant Patient Reported? Taking?    ALPRAZolam (XANAX) 0.5 mg tablet   No No   Sig: Take 1 tablet (0.5 mg total) by mouth 2 (two) times a day as needed for anxiety   B-D UF III MINI PEN NEEDLES 31G X 5 MM MISC  Self Yes No   BAQSIMI TWO PACK 3 MG/DOSE POWD  Self Yes No   Sig: Use as directed    BD Insulin Syringe U/F 31G X 5/16" 0.5 ML MISC  Self Yes No   Si (four) times a day As directed   Coenzyme Q10 (Co Q-10) 200 MG CAPS  Self Yes No   Sig: Take by mouth in the morning   Continuous Blood Gluc Sensor (Dexcom G6 Sensor) MISC   Yes No   Sig: Use 1 each   DULoxetine (CYMBALTA) 30 mg delayed release capsule   No No   Sig: TAKE 1 CAPSULE BY MOUTH EVERY DAY   Docusate Sodium (DSS) 100 MG CAPS   Yes No   Sig: Take 1 capsule by mouth every 12 (twelve) hours   NovoLOG 100 UNIT/ML SOLN  Self Yes No   Sig: 10 units with meals   Toujeo SoloStar 300 units/mL CONCENTRATED U-300 injection pen (1-unit dial)   Yes No   Si Units in the morning   aspirin 81 MG tablet  Self Yes No   Sig: Take 81 mg by mouth daily   cholecalciferol (VITAMIN D3) 1,000 units tablet  Self Yes No   Sig: Take 2,000 Units by mouth daily   diclofenac sodium (VOLTAREN) 1 %  Self No No   Sig: Apply 2 g topically 4 (four) times a day   Patient taking differently: Apply 2 g topically if needed   diltiazem (TIAZAC) 300 MG 24 hr capsule  Self Yes No   Sig: Take 300 mg by mouth daily   irbesartan (AVAPRO) 300 mg tablet  Self Yes No   Sig: Take 300 mg by mouth   isosorbide mononitrate (IMDUR) 60 mg 24 hr tablet  Self Yes No   Sig: TAKE 1.5 TABs AT NIGHT   labetalol (NORMODYNE) 100 mg tablet   Yes No   Sig: TAKE 1 TABLET BY MOUTH DAILY AFTER LUNCH   levothyroxine 50 mcg tablet  Self Yes No   Sig: Take 50 mcg by mouth daily   nitroglycerin (NITROLINGUAL) 0.4 mg/spray spray  Self Yes No   rosuvastatin (CRESTOR) 20 MG tablet  Self Yes No   Sig: Take 20 mg by mouth every evening     venlafaxine (EFFEXOR-XR) 37.5 mg 24 hr capsule   No No   Sig: TAKE 1 CAPSULE BY MOUTH DAILY WITH BREAKFAST.    venlafaxine (EFFEXOR-XR) 75 mg 24 hr capsule   No No   Sig: Take 1 capsule (75 mg total) by mouth daily with breakfast      Facility-Administered Medications: None       Past Medical History:   Diagnosis Date    Acute embolism and thrombosis of unspecified deep veins of unspecified lower extremity (HCC)     Last Assessed:  17    Anemia     Anosmia     Anxiety     Arthritis     Asthma     Back pain     Bilateral macular retinal edema     CAD (coronary artery disease)     Cataract     Cervical disc herniation     Cervical radiculopathy     Cervical spinal stenosis     Cervical spondylolysis     Chronic kidney disease     Chronic mastoiditis     Colon polyp     Complex endometrial hyperplasia     Depression     Diabetes mellitus ([address])     Disease of thyroid gland     DVT (deep venous thrombosis) ([facility])     DVT (deep venous thrombosis) ([address]) 6/16/2017    Fibromyalgia     Fibromyalgia, primary     Hyperlipidemia     Hypertension     Hypothyroid     Kidney stone     Lumbar radiculopathy     Neck pain     Obese     PONV (postoperative nausea and vomiting)     Shingles 07/01/2021    Spinal stenosis     Stomach ulcer     Thyroid disease        Past Surgical History:   Procedure Laterality Date    BACK SURGERY      CARPAL TUNNEL RELEASE      CATARACT EXTRACTION      CHOLECYSTECTOMY      COLONOSCOPY      CORONARY ANGIOPLASTY      CORONARY ARTERY BYPASS GRAFT      CYSTOSCOPY N/A 6/20/2017    Procedure: CYSTOSCOPY;  Surgeon: Puma Stauffer MD;  Location: [facility];  Service: Gynecology Oncology    CYSTOSCOPY      UT LAPS TOTAL HYSTERECT 250 GM/< W/RMVL TUBE/OVARY N/A 6/20/2017    Procedure: ROBOTIC HYSTERECTOMY; BILATERAL SALPINO-OOPHERECTOMY; umbilical hernia repair.;  Surgeon: Puma Stauffer MD;  Location: [facility];  Service: Gynecology Oncology    UT REPAIR FIRST ABDOMINAL WALL HERNIA N/A 5/12/2022    Procedure: REPAIR HERNIA INCISIONAL;  Surgeon: Min Carter DO;  Location: [facility];  Service: General    TONSILECTOMY AND ADNOIDECTOMY      TONSILLECTOMY      UMBILICAL HERNIA REPAIR         Family History   Problem Relation Age of Onset    Arthritis Mother     Leukemia Mother     Other Mother         Anxiety, major depressive disorder, recurrent episode with atypical features    Coronary artery disease Father         Heart problem    Diabetes Father     Other Father         Infectious disease    Alzheimer's disease Maternal Grandmother     Other Maternal Grandfather         Heart problem    Other Daughter         Anxiety, major depressive disorder, recurrent episode with atypical features    Alcohol abuse Other         Grandparent    Cancer Family     Diabetes Family     Hypertension Family     Other Family         Reported prior back trouble, thyroid disorder     I have reviewed and agree with the history as documented.     E-Cigarette/Vaping E-Cigarette Use Never User      E-Cigarette/Vaping Substances    Nicotine No     THC No     CBD No     Flavoring No     Other No     Unknown No      Social History     Tobacco Use    Smoking status: Former     Packs/day: 1.00     Years: 6.00     Total pack years: 6.00     Types: Cigarettes     Quit date:      Years since quittin.9    Smokeless tobacco: Never    Tobacco comments:     Smoked about a half a pack for about 4 yrs. Quite about 50 yrs ago. Vaping Use    Vaping Use: Never used   Substance Use Topics    Alcohol use: No    Drug use: No        Review of Systems   Constitutional:  Negative for appetite change, chills, diaphoresis, fatigue and fever. HENT:  Negative for congestion, ear pain, postnasal drip, rhinorrhea, sore throat and trouble swallowing. Eyes:  Negative for pain and visual disturbance. Respiratory:  Negative for cough and shortness of breath. Cardiovascular:  Negative for chest pain and palpitations. Gastrointestinal:  Negative for abdominal pain, constipation, diarrhea, nausea and vomiting. Genitourinary:  Negative for decreased urine volume, dysuria and hematuria. Musculoskeletal:  Negative for arthralgias and back pain. Skin:  Negative for color change and rash. Neurological:  Negative for dizziness, seizures, syncope, weakness, light-headedness and headaches. Psychiatric/Behavioral:  The patient is nervous/anxious. All other systems reviewed and are negative.       Physical Exam  ED Triage Vitals   Temperature Pulse Respirations Blood Pressure SpO2   23 1230 23 1230 23 1230 23 1230 23 1230   98.4 °F (36.9 °C) 95 18 141/91 99 %      Temp Source Heart Rate Source Patient Position - Orthostatic VS BP Location FiO2 (%)   23 1230 23 1230 23 1426 23 1230 --   Oral Monitor Lying Right arm       Pain Score       --                    Orthostatic Vital Signs  Vitals:    23 1230 23 1426 23 1522   BP: 141/91  (!) 186/81   Pulse: 95 83 77   Patient Position - Orthostatic VS:  Lying        Physical Exam  Vitals and nursing note reviewed. Constitutional:       Appearance: Normal appearance. She is normal weight. HENT:      Head: Normocephalic and atraumatic. Right Ear: External ear normal.      Left Ear: External ear normal.      Nose: Nose normal.      Mouth/Throat:      Pharynx: Oropharynx is clear. Eyes:      Extraocular Movements: Extraocular movements intact. Conjunctiva/sclera: Conjunctivae normal.      Pupils: Pupils are equal, round, and reactive to light. Cardiovascular:      Rate and Rhythm: Normal rate and regular rhythm. Pulses: Normal pulses. Heart sounds: Normal heart sounds. Comments: RRR with +S1 and S2, no murmurs appreciated on exam. Peripheral pulses intact. Pulmonary:      Effort: Pulmonary effort is normal.      Breath sounds: Normal breath sounds. Comments: CTABL with no abnormal lung sounds such as wheezes or rales appreciated on exam.     Abdominal:      General: Abdomen is flat. Bowel sounds are normal. There is no distension. Palpations: Abdomen is soft. Tenderness: There is no abdominal tenderness. Comments: Soft, non tender, normo-active bowel sounds. Without rigidity, guarding, or distension. Musculoskeletal:         General: Normal range of motion. Cervical back: Normal range of motion. Skin:     General: Skin is warm and dry. Neurological:      General: No focal deficit present. Mental Status: She is alert and oriented to person, place, and time. Mental status is at baseline. Comments: CN II-XII intact. No focal neurologic deficits noted on exam.  5/5 strength in all extremities. Neurovascularly intact with normal sensation and motor function.            ED Medications  Medications   ALPRAZolam (XANAX) tablet 0.5 mg (0.5 mg Oral Given 11/14/23 1422)       Diagnostic Studies  Results Reviewed Procedure Component Value Units Date/Time    TSH, 3rd generation with Free T4 reflex [[id number]]  (Normal) Collected: 11/14/23 1452    Lab Status: Final result Specimen: Blood from Arm, Right Updated: 11/14/23 1532     TSH 3RD GENERATON 3.003 uIU/mL     Comprehensive metabolic panel [[id number]]  (Abnormal) Collected: 11/14/23 1452    Lab Status: Final result Specimen: Blood from Arm, Right Updated: 11/14/23 1531     Sodium 136 mmol/L      Potassium 4.5 mmol/L      Chloride 102 mmol/L      CO2 22 mmol/L      ANION GAP 12 mmol/L      BUN 20 mg/dL      Creatinine 1.54 mg/dL      Glucose 191 mg/dL      Calcium 10.0 mg/dL      AST 41 U/L      ALT 30 U/L      Alkaline Phosphatase 119 U/L      Total Protein 8.3 g/dL      Albumin 4.3 g/dL      Total Bilirubin 0.73 mg/dL      eGFR 32 ml/min/1.73sq m     Narrative:      National Kidney Disease Foundation guidelines for Chronic Kidney Disease (CKD):     Stage 1 with normal or high GFR (GFR > 90 mL/min/1.73 square meters)    Stage 2 Mild CKD (GFR = 60-89 mL/min/1.73 square meters)    Stage 3A Moderate CKD (GFR = 45-59 mL/min/1.73 square meters)    Stage 3B Moderate CKD (GFR = 30-44 mL/min/1.73 square meters)    Stage 4 Severe CKD (GFR = 15-29 mL/min/1.73 square meters)    Stage 5 End Stage CKD (GFR <15 mL/min/1.73 square meters)  Note: GFR calculation is accurate only with a steady state creatinine    CBC and differential [[id number]]  (Abnormal) Collected: 11/14/23 1452    Lab Status: Final result Specimen: Blood from Arm, Right Updated: 11/14/23 1500     WBC 10.22 Thousand/uL      RBC 4.59 Million/uL      Hemoglobin 12.9 g/dL      Hematocrit 39.5 %      MCV 86 fL      MCH 28.1 pg      MCHC 32.7 g/dL      RDW 14.5 %      MPV 11.7 fL      Platelets 905 Thousands/uL      nRBC 0 /100 WBCs      Neutrophils Relative 63 %      Immat GRANS % 1 %      Lymphocytes Relative 28 %      Monocytes Relative 7 %      Eosinophils Relative 0 %      Basophils Relative 1 %      Neutrophils Absolute 6.58 Thousands/µL      Immature Grans Absolute 0.05 Thousand/uL      Lymphocytes Absolute 2.81 Thousands/µL      Monocytes Absolute 0.71 Thousand/µL      Eosinophils Absolute 0.02 Thousand/µL      Basophils Absolute 0.05 Thousands/µL     Fingerstick Glucose (POCT) [[id number]]  (Abnormal) Collected: 11/14/23 1242    Lab Status: Final result Updated: 11/14/23 1243     POC Glucose 219 mg/dl                    No orders to display         Procedures  Procedures      ED Course  ED Course as of 11/16/23 1512   Tue Nov 14, 2023   1535 TSH 3RD GENERATON: 3.003                                       Medical Decision Making  67-year-old female with extensive PMH presented to the ED for confusion and anxiety following a medication adjustment. She stated that her PCP recently adjusted her medications including her Synthroid and Effexor. Patient was examined at bedside and was noted to be anxious with rapid speech and confusion about her medications. Patient was also noted to have recent life stressors such as moving and changes in psychiatrists. Patient's labs were obtained and reviewed by the ED provider. Patient's labs showed no acute concerns at this time. Patient was given 0.5 mg of Xanax to help with her anxiety. Upon reevaluation at bedside patient was noted to have improvement in her symptoms and was feeling better. Through shared decision making between the patient and the provider, the patient was planned for discharge. Patient was advised to follow-up outpatient with her PCP along with a psychiatrist.  Patient was also instructed to return to the ED if her symptoms worsen including but not limited to increasing anxiety, fever, nausea or vomiting, increasing chest pain, shortness of breath, or changes in her behavior. Amount and/or Complexity of Data Reviewed  Labs: ordered. Decision-making details documented in ED Course. Risk  Prescription drug management.           Disposition  Final diagnoses: Anxiety   Medication course changed     Time reflects when diagnosis was documented in both MDM as applicable and the Disposition within this note       Time User Action Codes Description Comment    11/14/2023  3:34 PM Heather Jesus Alberto [F41.9] Anxiety     11/14/2023  3:34 PM Freida Montemayor [J17.229] Medication course changed           ED Disposition       ED Disposition   Discharge    Condition   Stable    Date/Time   Tue Nov 14, 2023  3:33 PM    Comment   Shante Person discharge to home/self care.                    Follow-up Information       Follow up With Specialties Details Why Contact Info Additional [address], DO Internal Medicine Call in 3 days  [address]        [address] of [facility] Emergency Medicine Go to  As needed [phone] [facility], [place], [place], [address]            Discharge Medication List as of 11/14/2023  3:34 PM        CONTINUE these medications which have NOT CHANGED    Details   ALPRAZolam (XANAX) 0.5 mg tablet Take 1 tablet (0.5 mg total) by mouth 2 (two) times a day as needed for anxiety, Starting Mon 11/13/2023, Normal      aspirin 81 MG tablet Take 81 mg by mouth daily, Historical Med      B-D UF III MINI PEN NEEDLES 31G X 5 MM MISC Starting Fri 6/5/2020, Historical Med      BAQSIMI TWO PACK 3 MG/DOSE POWD Use as directed , Starting Tue 10/8/2019, Historical Med      BD Insulin Syringe U/F 31G X 5/16" 0.5 ML MISC 4 (four) times a day As directed, Starting Mon 8/22/2022, Historical Med      cholecalciferol (VITAMIN D3) 1,000 units tablet Take 2,000 Units by mouth daily, Historical Med      Coenzyme Q10 (Co Q-10) 200 MG CAPS Take by mouth in the morning, Historical Med      Continuous Blood Gluc Sensor (Dexcom G6 Sensor) MISC Use 1 each, Starting Fri 7/28/2023, Historical Med      diclofenac sodium (VOLTAREN) 1 % Apply 2 g topically 4 (four) times a day, Starting Tue 1/15/2019, Normal      diltiazem (TIAZAC) 300 MG 24 hr capsule Take 300 mg by mouth daily, Historical Med      Docusate Sodium (DSS) 100 MG CAPS Take 1 capsule by mouth every 12 (twelve) hours, Historical Med      DULoxetine (CYMBALTA) 30 mg delayed release capsule TAKE 1 CAPSULE BY MOUTH EVERY DAY, Starting Thu 10/19/2023, Normal      irbesartan (AVAPRO) 300 mg tablet Take 300 mg by mouth, Starting Mon 10/10/2022, Until Tue 10/10/2023 at 2359, Historical Med      isosorbide mononitrate (IMDUR) 60 mg 24 hr tablet TAKE 1.5 TABs AT NIGHT, Historical Med      labetalol (NORMODYNE) 100 mg tablet TAKE 1 TABLET BY MOUTH DAILY AFTER LUNCH, Historical Med      levothyroxine 50 mcg tablet Take 50 mcg by mouth daily, Historical Med      nitroglycerin (NITROLINGUAL) 0.4 mg/spray spray Historical Med      NovoLOG 100 UNIT/ML SOLN 10 units with meals, Historical Med      rosuvastatin (CRESTOR) 20 MG tablet Take 20 mg by mouth every evening  , Starting Tue 12/4/2018, Historical Med      Toujeo SoloStar 300 units/mL CONCENTRATED U-300 injection pen (1-unit dial) 30 Units in the morning, Starting Tue 9/12/2023, Historical Med      !! venlafaxine (EFFEXOR-XR) 37.5 mg 24 hr capsule TAKE 1 CAPSULE BY MOUTH DAILY WITH BREAKFAST., Normal      !! venlafaxine (EFFEXOR-XR) 75 mg 24 hr capsule Take 1 capsule (75 mg total) by mouth daily with breakfast, Starting Tue 11/7/2023, Normal       !! - Potential duplicate medications found. Please discuss with provider. No discharge procedures on file. PDMP Review         Value Time User    PDMP Reviewed  Yes 11/13/2023  6:57 PM Terri Heard,              ED Provider  Attending physically available and evaluated Luciana Lima. I managed the patient along with the ED Attending.     Electronically Signed by           Sami Holley MD  11/16/23 3932

## 2023-11-14 NOTE — ED ATTENDING ATTESTATION
11/14/2023  I, Fela Perez MD, saw and evaluated the patient. I have discussed the patient with the resident/non-physician practitioner and agree with the resident's/non-physician practitioner's findings, Plan of Care, and MDM as documented in the resident's/non-physician practitioner's note, except where noted. All available labs and Radiology studies were reviewed. I was present for key portions of any procedure(s) performed by the resident/non-physician practitioner and I was immediately available to provide assistance. At this point I agree with the current assessment done in the Emergency Department. I have conducted an independent evaluation of this patient a history and physical is as follows:    S:  Chief Complaint   Patient presents with    Altered Mental Status     Pt reports starting a higher dose of Effexor yesterday and with that has had confusion. Pt states she was taking 5 mg and was switched to 75 mg.  with pt and confirms confusion started with Effexor dose change. Kristine Stevenson is a 69 y.o. female who presents with the chief complaint of an episode earlier this morning of confusion. Her  reports she was belligerent, refusing to take her meds or to get dressed. This lasted for about an hour but has since resolved. She has a history of IDDM, MDD, HTN, Anxiety and arthritis. She saw her endocrinologist recently and is in the process of getting another glucose monitor (constant) because she has had frequent elevated blood sugars. Additionally she was seen by her pcp and had her effexor doubled to 75 mg. She also recently had her levothyroxine increased to 75 mcg from 50 due to an elevated TSH at her recent endocrine visit.       O:  ED Triage Vitals   Temperature Pulse Respirations Blood Pressure SpO2   11/14/23 1230 11/14/23 1230 11/14/23 1230 11/14/23 1230 11/14/23 1230   98.4 °F (36.9 °C) 95 18 141/91 99 %      Temp Source Heart Rate Source Patient Position - Orthostatic VS BP Location FiO2 (%)   11/14/23 1230 11/14/23 1230 11/14/23 1426 11/14/23 1230 --   Oral Monitor Lying Right arm       Pain Score       --                Physical Exam  Vitals and nursing note reviewed. Constitutional:       General: She is not in acute distress. Appearance: She is well-developed. HENT:      Head: Normocephalic and atraumatic. Eyes:      Pupils: Pupils are equal, round, and reactive to light. Neck:      Vascular: No JVD. Cardiovascular:      Rate and Rhythm: Normal rate and regular rhythm. Heart sounds: Normal heart sounds. No murmur heard. No friction rub. No gallop. Pulmonary:      Effort: Pulmonary effort is normal. No respiratory distress. Breath sounds: Normal breath sounds. No wheezing or rales. Chest:      Chest wall: No tenderness. Musculoskeletal:         General: No tenderness. Normal range of motion. Cervical back: Normal range of motion and neck supple. Skin:     General: Skin is warm and dry. Neurological:      General: No focal deficit present. Mental Status: She is alert and oriented to person, place, and time. Psychiatric:         Mood and Affect: Mood is anxious (mild). Behavior: Behavior normal.         Thought Content: Thought content normal.         Judgment: Judgment normal.       A/P:  Background: 69 y.o. female presents with confusion, resolved    Differential DX includes but is not limited to: anxiety attack, thryoid related disease, hypoglycemia, hyperglycemia, side effect of increased effexor or synthroid.      Plan: cbc, cmp, tsh      ED Course     Labs Reviewed   CBC AND DIFFERENTIAL - Abnormal       Result Value Ref Range Status    WBC 10.22 (*) 4.31 - 10.16 Thousand/uL Final    RBC 4.59  3.81 - 5.12 Million/uL Final    Hemoglobin 12.9  11.5 - 15.4 g/dL Final    Hematocrit 39.5  34.8 - 46.1 % Final    MCV 86  82 - 98 fL Final    MCH 28.1  26.8 - 34.3 pg Final    MCHC 32.7  31.4 - 37.4 g/dL Final RDW 14.5  11.6 - 15.1 % Final    MPV 11.7  8.9 - 12.7 fL Final    Platelets 873  867 - 390 Thousands/uL Final    nRBC 0  /100 WBCs Final    Neutrophils Relative 63  43 - 75 % Final    Immat GRANS % 1  0 - 2 % Final    Lymphocytes Relative 28  14 - 44 % Final    Monocytes Relative 7  4 - 12 % Final    Eosinophils Relative 0  0 - 6 % Final    Basophils Relative 1  0 - 1 % Final    Neutrophils Absolute 6.58  1.85 - 7.62 Thousands/µL Final    Immature Grans Absolute 0.05  0.00 - 0.20 Thousand/uL Final    Lymphocytes Absolute 2.81  0.60 - 4.47 Thousands/µL Final    Monocytes Absolute 0.71  0.17 - 1.22 Thousand/µL Final    Eosinophils Absolute 0.02  0.00 - 0.61 Thousand/µL Final    Basophils Absolute 0.05  0.00 - 0.10 Thousands/µL Final   COMPREHENSIVE METABOLIC PANEL - Abnormal    Sodium 136  135 - 147 mmol/L Final    Potassium 4.5  3.5 - 5.3 mmol/L Final    Comment: Slightly Hemolyzed:Results may be affected. Chloride 102  96 - 108 mmol/L Final    CO2 22  21 - 32 mmol/L Final    ANION GAP 12  mmol/L Final    BUN 20  5 - 25 mg/dL Final    Creatinine 1.54 (*) 0.60 - 1.30 mg/dL Final    Comment: Standardized to IDMS reference method    Glucose 191 (*) 65 - 140 mg/dL Final    Comment: If the patient is fasting, the ADA then defines impaired fasting glucose as > 100 mg/dL and diabetes as > or equal to 123 mg/dL. Calcium 10.0  8.4 - 10.2 mg/dL Final    AST 41 (*) 13 - 39 U/L Final    Comment: Slightly Hemolyzed:Results may be affected. ALT 30  7 - 52 U/L Final    Comment: Specimen collection should occur prior to Sulfasalazine administration due to the potential for falsely depressed results.      Alkaline Phosphatase 119 (*) 34 - 104 U/L Final    Total Protein 8.3  6.4 - 8.4 g/dL Final    Albumin 4.3  3.5 - 5.0 g/dL Final    Total Bilirubin 0.73  0.20 - 1.00 mg/dL Final    Comment: Use of this assay is not recommended for patients undergoing treatment with eltrombopag due to the potential for falsely elevated results. N-acetyl-p-benzoquinone imine (metabolite of Acetaminophen) will generate erroneously low results in samples for patients that have taken an overdose of Acetaminophen. eGFR 32  ml/min/1.73sq m Final    Narrative:     National Kidney Disease Foundation guidelines for Chronic Kidney Disease (CKD):     Stage 1 with normal or high GFR (GFR > 90 mL/min/1.73 square meters)    Stage 2 Mild CKD (GFR = 60-89 mL/min/1.73 square meters)    Stage 3A Moderate CKD (GFR = 45-59 mL/min/1.73 square meters)    Stage 3B Moderate CKD (GFR = 30-44 mL/min/1.73 square meters)    Stage 4 Severe CKD (GFR = 15-29 mL/min/1.73 square meters)    Stage 5 End Stage CKD (GFR <15 mL/min/1.73 square meters)  Note: GFR calculation is accurate only with a steady state creatinine   POCT GLUCOSE - Abnormal    POC Glucose 219 (*) 65 - 140 mg/dl Final   TSH, 3RD GENERATION WITH FREE T4 REFLEX - Normal    TSH 3RD GENERATON 3.003  0.450 - 4.500 uIU/mL Final    Comment: The recommended reference ranges for TSH during pregnancy are as follows:   First trimester 0.100 to 2.500 uIU/mL   Second trimester  0.200 to 3.000 uIU/mL   Third trimester 0.300 to 3.000 uIU/m    Note: Normal ranges may not apply to patients who are transgender, non-binary, or whose legal sex, sex at birth, and gender identity differ. Adult TSH (3rd generation) reference range follows the recommended guidelines of the American Thyroid Association, January, 2020. Critical Care Time  Procedures  Time reflects when diagnosis was documented in both MDM as applicable and the Disposition within this note       Time User Action Codes Description Comment    11/14/2023  3:34 PM Artis Hawkins Add [F41.9] Anxiety     11/14/2023  3:34 PM Artis Hawkins Add [Z79.899] Medication course changed           ED Disposition       ED Disposition   Discharge    Condition   Stable    Date/Time   Tue Nov 14, 2023  3:33 PM    Comment   Emy Barnes discharge to home/self care. Follow-up Information       Follow up With Specialties Details Why Contact Info Additional [address], DO Internal Medicine Call in 3 days  [address] [address] [phone]       [address] [facility] Emergency Department Emergency Medicine Go to  As needed [address] [facility], [place], [place], [address]

## 2023-12-11 DIAGNOSIS — F41.0 PANIC ATTACKS: ICD-10-CM

## 2023-12-11 DIAGNOSIS — F41.1 GAD (GENERALIZED ANXIETY DISORDER): ICD-10-CM

## 2023-12-11 DIAGNOSIS — F41.9 ANXIETY: ICD-10-CM

## 2023-12-11 RX ORDER — ALPRAZOLAM 0.5 MG/1
0.5 TABLET ORAL 2 TIMES DAILY PRN
Qty: 60 TABLET | Refills: 0 | Status: SHIPPED | OUTPATIENT
Start: 2023-12-11

## 2023-12-11 NOTE — TELEPHONE ENCOUNTER
Reason for call:   [x] Refill   [] Prior Auth  [] Other:     Office:   [x] PCP/Provider - [facility] / Oscar Louis  [] Specialty/Provider -     Medication: alprazolam    Dose/Frequency: 0.5mg bid prn    Quantity: 60    Pharmacy: [facility]/pharmacy #[id number]- [place] - [address] [facility]     Does the patient have enough for 3 days?    [] Yes   [x] No - Send as HP to POD

## 2024-01-08 DIAGNOSIS — F41.9 ANXIETY: ICD-10-CM

## 2024-01-08 DIAGNOSIS — F41.0 PANIC ATTACKS: ICD-10-CM

## 2024-01-08 DIAGNOSIS — F41.1 GAD (GENERALIZED ANXIETY DISORDER): ICD-10-CM

## 2024-01-08 RX ORDER — ALPRAZOLAM 0.5 MG/1
0.5 TABLET ORAL 2 TIMES DAILY PRN
Qty: 60 TABLET | Refills: 0 | Status: SHIPPED | OUTPATIENT
Start: 2024-01-08

## 2024-01-24 ENCOUNTER — OFFICE VISIT (OUTPATIENT)
Dept: INTERNAL MEDICINE CLINIC | Facility: CLINIC | Age: 79
End: 2024-01-24
Payer: COMMERCIAL

## 2024-01-24 VITALS
SYSTOLIC BLOOD PRESSURE: 128 MMHG | RESPIRATION RATE: 16 BRPM | HEIGHT: 59 IN | WEIGHT: 189 LBS | BODY MASS INDEX: 38.1 KG/M2 | HEART RATE: 63 BPM | DIASTOLIC BLOOD PRESSURE: 78 MMHG | OXYGEN SATURATION: 98 %

## 2024-01-24 DIAGNOSIS — E11.42 DIABETIC POLYNEUROPATHY ASSOCIATED WITH TYPE 2 DIABETES MELLITUS (HCC): ICD-10-CM

## 2024-01-24 DIAGNOSIS — E66.9 OBESITY (BMI 30-39.9): ICD-10-CM

## 2024-01-24 DIAGNOSIS — E78.2 MIXED HYPERLIPIDEMIA: ICD-10-CM

## 2024-01-24 DIAGNOSIS — Z00.00 MEDICARE ANNUAL WELLNESS VISIT, SUBSEQUENT: Primary | ICD-10-CM

## 2024-01-24 DIAGNOSIS — E66.01 CLASS 3 SEVERE OBESITY DUE TO EXCESS CALORIES WITHOUT SERIOUS COMORBIDITY IN ADULT, UNSPECIFIED BMI (HCC): ICD-10-CM

## 2024-01-24 DIAGNOSIS — F33.2 MDD (MAJOR DEPRESSIVE DISORDER), RECURRENT SEVERE, WITHOUT PSYCHOSIS (HCC): ICD-10-CM

## 2024-01-24 DIAGNOSIS — I15.9 SECONDARY HYPERTENSION: ICD-10-CM

## 2024-01-24 DIAGNOSIS — N18.4 STAGE 4 CHRONIC KIDNEY DISEASE (HCC): ICD-10-CM

## 2024-01-24 DIAGNOSIS — M17.0 PRIMARY OSTEOARTHRITIS OF BOTH KNEES: ICD-10-CM

## 2024-01-24 DIAGNOSIS — R14.0 BLOATING: ICD-10-CM

## 2024-01-24 DIAGNOSIS — Z23 NEED FOR SHINGLES VACCINE: ICD-10-CM

## 2024-01-24 DIAGNOSIS — R21 RASH: ICD-10-CM

## 2024-01-24 DIAGNOSIS — R14.0 BLOATING SYMPTOM: ICD-10-CM

## 2024-01-24 PROCEDURE — 99214 OFFICE O/P EST MOD 30 MIN: CPT | Performed by: INTERNAL MEDICINE

## 2024-01-24 PROCEDURE — G0439 PPPS, SUBSEQ VISIT: HCPCS | Performed by: INTERNAL MEDICINE

## 2024-01-24 RX ORDER — ZOSTER VACCINE RECOMBINANT, ADJUVANTED 50 MCG/0.5
0.5 KIT INTRAMUSCULAR ONCE
Qty: 1 EACH | Refills: 1 | Status: SHIPPED | OUTPATIENT
Start: 2024-01-24 | End: 2024-01-24

## 2024-01-24 NOTE — PATIENT INSTRUCTIONS
Medicare Preventive Visit Patient Instructions  Thank you for completing your Welcome to Medicare Visit or Medicare Annual Wellness Visit today. Your next wellness visit will be due in one year (1/24/2025).  The screening/preventive services that you may require over the next 5-10 years are detailed below. Some tests may not apply to you based off risk factors and/or age. Screening tests ordered at today's visit but not completed yet may show as past due. Also, please note that scanned in results may not display below.  Preventive Screenings:  Service Recommendations Previous Testing/Comments   Colorectal Cancer Screening  * Colonoscopy    * Fecal Occult Blood Test (FOBT)/Fecal Immunochemical Test (FIT)  * Fecal DNA/Cologuard Test  * Flexible Sigmoidoscopy Age: 45-75 years old   Colonoscopy: every 10 years (may be performed more frequently if at higher risk)  OR  FOBT/FIT: every 1 year  OR  Cologuard: every 3 years  OR  Sigmoidoscopy: every 5 years  Screening may be recommended earlier than age 45 if at higher risk for colorectal cancer. Also, an individualized decision between you and your healthcare provider will decide whether screening between the ages of 76-85 would be appropriate. Colonoscopy: 05/30/2017  FOBT/FIT: Not on file  Cologuard: Not on file  Sigmoidoscopy: Not on file          Breast Cancer Screening Age: 40+ years old  Frequency: every 1-2 years  Not required if history of left and right mastectomy Mammogram: 05/10/2017        Cervical Cancer Screening Between the ages of 21-29, pap smear recommended once every 3 years.   Between the ages of 30-65, can perform pap smear with HPV co-testing every 5 years.   Recommendations may differ for women with a history of total hysterectomy, cervical cancer, or abnormal pap smears in past. Pap Smear: Not on file        Hepatitis C Screening Once for adults born between 1945 and 1965  More frequently in patients at high risk for Hepatitis C Hep C Antibody:  12/13/2021        Diabetes Screening 1-2 times per year if you're at risk for diabetes or have pre-diabetes Fasting glucose: 154 mg/dL (9/2/2023)  A1C: 11.8 % of total Hgb (11/6/2023)      Cholesterol Screening Once every 5 years if you don't have a lipid disorder. May order more often based on risk factors. Lipid panel: 12/13/2021          Other Preventive Screenings Covered by Medicare:  Abdominal Aortic Aneurysm (AAA) Screening: covered once if your at risk. You're considered to be at risk if you have a family history of AAA.  Lung Cancer Screening: covers low dose CT scan once per year if you meet all of the following conditions: (1) Age 55-77; (2) No signs or symptoms of lung cancer; (3) Current smoker or have quit smoking within the last 15 years; (4) You have a tobacco smoking history of at least 20 pack years (packs per day multiplied by number of years you smoked); (5) You get a written order from a healthcare provider.  Glaucoma Screening: covered annually if you're considered high risk: (1) You have diabetes OR (2) Family history of glaucoma OR (3)  aged 50 and older OR (4)  American aged 65 and older  Osteoporosis Screening: covered every 2 years if you meet one of the following conditions: (1) You're estrogen deficient and at risk for osteoporosis based off medical history and other findings; (2) Have a vertebral abnormality; (3) On glucocorticoid therapy for more than 3 months; (4) Have primary hyperparathyroidism; (5) On osteoporosis medications and need to assess response to drug therapy.   Last bone density test (DXA Scan): Not on file.  HIV Screening: covered annually if you're between the age of 15-65. Also covered annually if you are younger than 15 and older than 65 with risk factors for HIV infection. For pregnant patients, it is covered up to 3 times per pregnancy.    Immunizations:  Immunization Recommendations   Influenza Vaccine Annual influenza vaccination during  flu season is recommended for all persons aged >= 6 months who do not have contraindications   Pneumococcal Vaccine   * Pneumococcal conjugate vaccine = PCV13 (Prevnar 13), PCV15 (Vaxneuvance), PCV20 (Prevnar 20)  * Pneumococcal polysaccharide vaccine = PPSV23 (Pneumovax) Adults 19-63 yo with certain risk factors or if 65+ yo  If never received any pneumonia vaccine: recommend Prevnar 20 (PCV20)  Give PCV20 if previously received 1 dose of PCV13 or PPSV23   Hepatitis B Vaccine 3 dose series if at intermediate or high risk (ex: diabetes, end stage renal disease, liver disease)   Respiratory syncytial virus (RSV) Vaccine - COVERED BY MEDICARE PART D  * RSVPreF3 (Arexvy) CDC recommends that adults 60 years of age and older may receive a single dose of RSV vaccine using shared clinical decision-making (SCDM)   Tetanus (Td) Vaccine - COST NOT COVERED BY MEDICARE PART B Following completion of primary series, a booster dose should be given every 10 years to maintain immunity against tetanus. Td may also be given as tetanus wound prophylaxis.   Tdap Vaccine - COST NOT COVERED BY MEDICARE PART B Recommended at least once for all adults. For pregnant patients, recommended with each pregnancy.   Shingles Vaccine (Shingrix) - COST NOT COVERED BY MEDICARE PART B  2 shot series recommended in those 19 years and older who have or will have weakened immune systems or those 50 years and older     Health Maintenance Due:      Topic Date Due   • Breast Cancer Screening: Mammogram  05/10/2018   • Colorectal Cancer Screening  05/30/2022   • Hepatitis C Screening  Completed     Immunizations Due:  There are no preventive care reminders to display for this patient.  Advance Directives   What are advance directives?  Advance directives are legal documents that state your wishes and plans for medical care. These plans are made ahead of time in case you lose your ability to make decisions for yourself. Advance directives can apply to any  medical decision, such as the treatments you want, and if you want to donate organs.   What are the types of advance directives?  There are many types of advance directives, and each state has rules about how to use them. You may choose a combination of any of the following:  Living will:  This is a written record of the treatment you want. You can also choose which treatments you do not want, which to limit, and which to stop at a certain time. This includes surgery, medicine, IV fluid, and tube feedings.   Durable power of  for healthcare (DPAHC):  This is a written record that states who you want to make healthcare choices for you when you are unable to make them for yourself. This person, called a proxy, is usually a family member or a friend. You may choose more than 1 proxy.  Do not resuscitate (DNR) order:  A DNR order is used in case your heart stops beating or you stop breathing. It is a request not to have certain forms of treatment, such as CPR. A DNR order may be included in other types of advance directives.  Medical directive:  This covers the care that you want if you are in a coma, near death, or unable to make decisions for yourself. You can list the treatments you want for each condition. Treatment may include pain medicine, surgery, blood transfusions, dialysis, IV or tube feedings, and a ventilator (breathing machine).  Values history:  This document has questions about your views, beliefs, and how you feel and think about life. This information can help others choose the care that you would choose.  Why are advance directives important?  An advance directive helps you control your care. Although spoken wishes may be used, it is better to have your wishes written down. Spoken wishes can be misunderstood, or not followed. Treatments may be given even if you do not want them. An advance directive may make it easier for your family to make difficult choices about your care.   Weight  Management   Why it is important to manage your weight:  Being overweight increases your risk of health conditions such as heart disease, high blood pressure, type 2 diabetes, and certain types of cancer. It can also increase your risk for osteoarthritis, sleep apnea, and other respiratory problems. Aim for a slow, steady weight loss. Even a small amount of weight loss can lower your risk of health problems.  How to lose weight safely:  A safe and healthy way to lose weight is to eat fewer calories and get regular exercise. You can lose up about 1 pound a week by decreasing the number of calories you eat by 500 calories each day.   Healthy meal plan for weight management:  A healthy meal plan includes a variety of foods, contains fewer calories, and helps you stay healthy. A healthy meal plan includes the following:  Eat whole-grain foods more often.  A healthy meal plan should contain fiber. Fiber is the part of grains, fruits, and vegetables that is not broken down by your body. Whole-grain foods are healthy and provide extra fiber in your diet. Some examples of whole-grain foods are whole-wheat breads and pastas, oatmeal, brown rice, and bulgur.  Eat a variety of vegetables every day.  Include dark, leafy greens such as spinach, kale, yoel greens, and mustard greens. Eat yellow and orange vegetables such as carrots, sweet potatoes, and winter squash.   Eat a variety of fruits every day.  Choose fresh or canned fruit (canned in its own juice or light syrup) instead of juice. Fruit juice has very little or no fiber.  Eat low-fat dairy foods.  Drink fat-free (skim) milk or 1% milk. Eat fat-free yogurt and low-fat cottage cheese. Try low-fat cheeses such as mozzarella and other reduced-fat cheeses.  Choose meat and other protein foods that are low in fat.  Choose beans or other legumes such as split peas or lentils. Choose fish, skinless poultry (chicken or turkey), or lean cuts of red meat (beef or pork). Before  you cook meat or poultry, cut off any visible fat.   Use less fat and oil.  Try baking foods instead of frying them. Add less fat, such as margarine, sour cream, regular salad dressing and mayonnaise to foods. Eat fewer high-fat foods. Some examples of high-fat foods include french fries, doughnuts, ice cream, and cakes.  Eat fewer sweets.  Limit foods and drinks that are high in sugar. This includes candy, cookies, regular soda, and sweetened drinks.  Exercise:  Exercise at least 30 minutes per day on most days of the week. Some examples of exercise include walking, biking, dancing, and swimming. You can also fit in more physical activity by taking the stairs instead of the elevator or parking farther away from stores. Ask your healthcare provider about the best exercise plan for you.      © Copyright [employer] 2018 Information is for End User's use only and may not be sold, redistributed or otherwise used for commercial purposes. All illustrations and images included in CareNotes® are the copyrighted property of [employer] or [employer]

## 2024-01-24 NOTE — PROGRESS NOTES
Assessment and Plan:     Problem List Items Addressed This Visit        Endocrine    Diabetic polyneuropathy associated with type 2 diabetes mellitus (HCC)       Lab Results   Component Value Date    HGBA1C 11.8 (H) 11/06/2023   I have counselled the pt to follow a healthy and balanced diet ,and recommend routine exercise.  I will be ordering diabetic laboratories including comprehensive metabolic panel, hemoglobin A1c, urine microalbumin, lipid panel.  Patient is working with endocrinology target A1c under 8            Cardiovascular and Mediastinum    Hypertension     Hypertension - controlled, I have counseled patient following healthy balance diet, I would like the patient reduce sodium, exercise routinely, I would like the patient continued the med current medical regiment and we will continue to monitor.  Blood pressure 128/78            Musculoskeletal and Integument    Rash     Psoriasis rash around the ear will have patient see dermatology         Relevant Orders    Ambulatory Referral to Dermatology       Genitourinary    Stage 4 chronic kidney disease (HCC)     Lab Results   Component Value Date    EGFR 32 11/14/2023    EGFR 30 09/02/2023    EGFR 26 09/01/2023    CREATININE 1.54 (H) 11/14/2023    CREATININE 1.64 (H) 09/02/2023    CREATININE 1.85 (H) 09/01/2023   Drink adequate amount of water, avoid anti-inflammatories, reduce sodium in the diet and will continue to monitor the kidney function            Other    MDD (major depressive disorder), recurrent severe, without psychosis (HCC)     Clinically stable and doing well continue the current medical regiment will continue monitor.  Patient did not tolerate the higher dose of Effexor she will continue with the low-dose Effexor XR 37.5 once daily         Class 3 severe obesity due to excess calories without serious comorbidity in adult, unspecified BMI (HCC)     Obesity -I have counseled patient following healthy and balanced diet, I would like the  patient to lose weight, I would like the patient exercise routinely; we will continue monitor the patient's progress.         Bloating symptom     Diet worksheet provided will order ultrasound abdomen and refer to GI for further evaluation         Mixed hyperlipidemia      hyperlipidemia controlled continue with current medical regiment recommend a low-cholesterol diet and recommend routine exercise we will continue to monitor the progress.  Continue Crestor 20 mg once daily         Obesity (BMI 30-39.9)     Obesity -I have counseled patient following healthy and balanced diet, I would like the patient to lose weight, I would like the patient exercise routinely; we will continue monitor the patient's progress.         Medicare annual wellness visit, subsequent - Primary     Assessment and plan 1.  Medicare subsequent annual wellness examination overall the patient is clinically stable and doing well, we encouraged the patient to follow a healthy and balanced diet.  We recommend that the patient exercise routinely approximately 30 minutes 5 times per week .  We have reviewed the patient's vaccines and have made recommendations for updates if necessary annual flu shot    .  We will be ordering screening laboratories which are age appropriate.  Return to the office in   4 months   call if any problems.        Other Visit Diagnoses     Primary osteoarthritis of both knees        Relevant Orders    Ambulatory Referral to Orthopedic Surgery    Bloating        Relevant Orders    Ambulatory Referral to Gastroenterology    US abdomen complete    Need for shingles vaccine                Falls Plan of Care: balance, strength, and gait training instructions were provided.       Preventive health issues were discussed with patient, and age appropriate screening tests were ordered as noted in patient's After Visit Summary.  Personalized health advice and appropriate referrals for health education or preventive services given if  needed, as noted in patient's After Visit Summary.     History of Present Illness:     PatienType 2 diabetes mellitus with complication, with long-term current use of insulin (HCC)           Lab Results   Component Value Date     HGBA1C 11.8 (H) 11/06/2023   I have counselled the pt to follow a healthy and balanced diet ,and recommend routine exercise.  Target A1c under 8 working with endocrinologist  We will be getting the Dexcom 7 no further hypoglycemia at this point recommend a healthy and balanced diet reducing carbohydrates and sweets     Stage 4 chronic kidney disease (HCC)        Lab Results   Component Value Date     EGFR 30 09/02/2023     EGFR 26 09/01/2023     EGFR 25 08/31/2023     CREATININE 1.64 (H) 09/02/2023     CREATININE 1.85 (H) 09/01/2023     CREATININE 1.86 (H) 08/31/2023   Drink adequate amount of water, avoid anti-inflammatories, reduce sodium in the diet and will continue to monitor the kidney function     MDD (major depressive disorder), recurrent severe, without psychosis ([facility])  Anxiety/depression no suicidal ideation recommend the patient go for counseling we will increase Effexor to 75 mg once daily     Hyperlipidemia  Hyperlipidemia controlled continue with current medical regiment recommend a low-cholesterol diet and recommend routine exercise we will continue to monitor the progress.  Continue Crestor 20 mg once daily     Class 3 severe obesity due to excess calories without serious comorbidity in adult, unspecified BMI ([facility])  Obesity -I have counseled patient following healthy and balanced diet, I would like the patient to lose weight, I would like the patient exercise routinely; we will continue monitor the patient's progress.            Problem List Items Addressed This Visit               Endocrine     Type 2 diabetes mellitus with complication, with long-term current use of insulin (HCC) (Chronic)                Lab Results   Component Value Date     HGBA1C 11.8 (H) 11/06/2023    I have counselled the pt to follow a healthy and balanced diet ,and recommend routine exercise.  Target A1c under 8 working with endocrinologist  We will be getting the Dexcom 7 no further hypoglycemia at this point recommend a healthy and balanced diet reducing carbohydrates and sweets                Genitourinary     Stage 4 chronic kidney disease (HCC)             Lab Results   Component Value Date     EGFR 30 09/02/2023     EGFR 26 09/01/2023     EGFR 25 08/31/2023     CREATININE 1.64 (H) 09/02/2023     CREATININE 1.85 (H) 09/01/2023     CREATININE 1.86 (H) 08/31/2023   Drink adequate amount of water, avoid anti-inflammatories, reduce sodium in the diet and will continue to monitor the kidney function                Other     Hyperlipidemia (Chronic)       Hyperlipidemia controlled continue with current medical regiment recommend a low-cholesterol diet and recommend routine exercise we will continue to monitor the progress.  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           Anxiety - Primary     Rel   RTO in 4 months call if any problems    HPI 78-year old female coming in for a follow up office visit regarding chronic kidney disease, bloating, depression, obesity, type 2 diabetes, hyperlipidemia, hypertension; the patient reports me compliant taking medications without untoward side effects the.  The  patient is here to review his medical condition, update me on the medical condition and the patient reports me no hospitalizations and 1 ER visits.  Patient reports me she had been in the emergency room altered mental status after increasing the dose of Effexor; she reported not feeling her usual self she had felt confused after the Effexor was increased to 75 mg since reducing the dose to 37.5 she feels her usual self.  Her anxiety levels are stable she is working with her endocrinologist regarding anxiety and may be starting Ozempic in the near future.  She has not been able to start the Ozempic because of the symptom of bloating.  Her bloating has been ongoing for the last month no nausea no vomiting no diarrhea no blood in the stool no weight loss.  Reports lost weight went to dr conn , the weight she lost couldn't tell endo Dr Bernadette dwyer ozempamandeep , she reports bloating  more burping and beltching ,  soda; pt dosent like where she lives , patient does have ongoing knee pain and stiffness she has advanced arthritis I would like her to see orthopedics to see if she is a suitable candidate for knee replacement or other treatments.  Patient Care Team:  Sukumar Clemens DO as PCP - General  MD Sukumar Sher DO Minh Q Nguyen, MD Adam Dratch, MD Benjamin Joseph Quintana, MD Chatargy Kaza, MD John Kintzer, MD Farooq Qureshi, MD Sharvil Umesh Sheth, MD     Review of Systems:     Review of Systems   Constitutional:  Negative for activity change, appetite change and unexpected weight change.   HENT:  Negative for congestion and postnasal drip.    Eyes:  Negative for visual disturbance.   Respiratory:  Negative for cough and shortness of breath.    Cardiovascular:  Negative for chest pain.   Gastrointestinal:  Negative for abdominal pain, diarrhea, nausea and vomiting.   Neurological:  Negative for dizziness, light-headedness and headaches.   Hematological:  Negative for adenopathy.    Bloating     Problem List:     Patient Active Problem List   Diagnosis   • Panic attacks   • Type 2 diabetes mellitus with complication, with long-term current use of insulin ([facility])   • MDD (major depressive disorder), recurrent severe, without psychosis ([facility])   • Chest pain on breathing   • History of DVT (deep vein thrombosis)   • PVC's (premature ventricular contractions)   • CAD (coronary artery disease)   • Hypertension   • Hyperlipidemia   • Fibromyalgia   • Spinal stenosis of lumbar region   • Adenomatous polyp of colon   • Vitreo-retinal adhesions   • Vitamin D deficiency   • Vitamin B12 deficiency   • Ventral hernia   • Vascular claudication ([facility])   • Thickened endometrium   • Spondylosis of lumbar region without myelopathy or radiculopathy   • Spondylosis of cervical region without myelopathy or radiculopathy   • Palpitations   • Onychomycosis   • 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   • Class 3 severe obesity due to excess calories without serious comorbidity in adult, unspecified BMI ([facility])   • Myofascial pain syndrome   • Retinal edema   • Diabetic polyneuropathy associated with type 2 diabetes mellitus ([facility])   • SARAY (obstructive sleep apnea)   • Allergic rhinitis   • Pulmonary nodules   • Lumbar spondylosis   • Chronic pain syndrome   • Lumbar facet arthropathy   • Post-nasal drip   • Vision changes   • Closed fracture of nasal bones   • Restrictive lung disease secondary to obesity   • MADAN (generalized anxiety disorder)   • Insomnia   • Lung nodule   • Bloating symptom   • Neck pain   • Herpes zoster without complication   • Post herpetic neuralgia   • SOB (shortness of breath)   • Apraxia   • Rash   • Microalbuminuria   • Hypothyroidism   • Diplopia   • Lumbar radiculopathy   • Contracture of hand   • Colon polyps   • Presence of stent in coronary artery   • Psoriasis with arthropathy ([facility])   • S/P CABG (coronary artery bypass graft)   • Complex  endometrial hyperplasia   • Mixed hyperlipidemia   • Lung nodule   • Other constipation   • Osteoarthritis of spine with radiculopathy, lumbar region   • Protrusion of intervertebral disc of thoracic region   • Encounter for screening mammogram for breast cancer   • Anxiety   • Stage 4 chronic kidney disease (HCC)   • Kidney mass   • Fall   • COVID-19   • Edema of both lower extremities   • Sciatica of right side   • CRI (chronic renal insufficiency)   • Enlarged liver   • Kidney cysts   • Right sided abdominal pain, secondary to ureterolithiasis   • Constipation   • Kidney stone   • Obesity (BMI 30-39.9)   • Medicare annual wellness visit, subsequent      Past Medical and Surgical History:     Past Medical History:   Diagnosis Date   • Acute embolism and thrombosis of unspecified deep veins of unspecified lower extremity (HCC)     Last Assessed:  5/18/17   • Anemia    • Anosmia    • Anxiety    • Arthritis    • Asthma    • Back pain    • Bilateral macular retinal edema    • CAD (coronary artery disease)    • Cataract    • Cervical disc herniation    • Cervical radiculopathy    • Cervical spinal stenosis    • Cervical spondylolysis    • Chronic kidney disease    • Chronic mastoiditis    • Colon polyp    • Complex endometrial hyperplasia    • Depression    • Diabetes mellitus (HCC)    • Disease of thyroid gland    • DVT (deep venous thrombosis) (HCC)    • DVT (deep venous thrombosis) (HCC) 6/16/2017   • Fibromyalgia    • Fibromyalgia, primary    • Hyperlipidemia    • Hypertension    • Hypothyroid    • Kidney stone    • Lumbar radiculopathy    • Neck pain    • Obese    • PONV (postoperative nausea and vomiting)    • Shingles 07/01/2021   • Spinal stenosis    • Stomach ulcer    • Thyroid disease      Past Surgical History:   Procedure Laterality Date   • BACK SURGERY     • CARPAL TUNNEL RELEASE     • CATARACT EXTRACTION     • CHOLECYSTECTOMY     • COLONOSCOPY     • CORONARY ANGIOPLASTY     • CORONARY ARTERY BYPASS GRAFT      • CYSTOSCOPY N/A 2017    Procedure: CYSTOSCOPY;  Surgeon: Tacho Arnold MD;  Location: [facility];  Service: Gynecology Oncology   • CYSTOSCOPY     • TN LAPS TOTAL HYSTERECT 250 GM/< W/RMVL TUBE/OVARY N/A 2017    Procedure: ROBOTIC HYSTERECTOMY; BILATERAL SALPINO-OOPHERECTOMY; umbilical hernia repair.;  Surgeon: Tacho Arnold MD;  Location: [facility];  Service: Gynecology Oncology   • TN REPAIR FIRST ABDOMINAL WALL HERNIA N/A 2022    Procedure: REPAIR HERNIA INCISIONAL;  Surgeon: Horacio Scherer DO;  Location: [facility];  Service: General   • TONSILECTOMY AND ADNOIDECTOMY     • TONSILLECTOMY     • UMBILICAL HERNIA REPAIR        Family History:     Family History   Problem Relation Age of Onset   • Arthritis Mother    • Leukemia Mother    • Other Mother         Anxiety, major depressive disorder, recurrent episode with atypical features   • Coronary artery disease Father         Heart problem   • Diabetes Father    • Other Father         Infectious disease   • Alzheimer's disease Maternal Grandmother    • Other Maternal Grandfather         Heart problem   • Other Daughter         Anxiety, major depressive disorder, recurrent episode with atypical features   • Alcohol abuse Other         Grandparent   • Cancer Family    • Diabetes Family    • Hypertension Family    • Other Family         Reported prior back trouble, thyroid disorder      Social History:     Social History     Socioeconomic History   • Marital status: /Civil Union     Spouse name: None   • Number of children: 2   • Years of education: High school or GED   • Highest education level: None   Occupational History   • Occupation: Retired   Tobacco Use   • Smoking status: Former     Current packs/day: 0.00     Average packs/day: 1 pack/day for 6.0 years (6.0 ttl pk-yrs)     Types: Cigarettes     Start date:      Quit date:      Years since quittin.0   • Smokeless tobacco: Never   • Tobacco comments:      Smoked about a half a pack for about 4 yrs.  Quite about 50 yrs ago.   Vaping Use   • Vaping status: Never Used   Substance and Sexual Activity   • Alcohol use: No   • Drug use: No   • Sexual activity: Not Currently     Comment: No known STD risk factors   Other Topics Concern   • None   Social History Narrative    Lives independently with spouse    No known risk factors    Denied:  Exercising regularly     Social Determinants of Health     Financial Resource Strain: Low Risk  (1/24/2024)    Overall Financial Resource Strain (CARDIA)    • Difficulty of Paying Living Expenses: Not hard at all   Food Insecurity: No Food Insecurity (9/7/2023)    Received from [facility]    Hunger Vital Sign    • Worried About Running Out of Food in the Last Year: Never true    • Ran Out of Food in the Last Year: Never true   Transportation Needs: No Transportation Needs (1/24/2024)    PRAPARE - Transportation    • Lack of Transportation (Medical): No    • Lack of Transportation (Non-Medical): No   Physical Activity: Not on file   Stress: Not on file   Social Connections: Not on file   Intimate Partner Violence: Not At Risk (9/7/2023)    Received from [facility]    Humiliation, Afraid, Rape, and Kick questionnaire    • Fear of Current or Ex-Partner: No    • Emotionally Abused: No    • Physically Abused: No    • Sexually Abused: No   Housing Stability: Low Risk  (9/7/2023)    Received from [facility]    Housing Stability Vital Sign    • Unable to Pay for Housing in the Last Year: No    • Number of Places Lived in the Last Year: 1    • Unstable Housing in the Last Year: No      Medications and Allergies:     Current Outpatient Medications   Medication Sig Dispense Refill   • ALPRAZolam (XANAX) 0.5 mg tablet TAKE 1 TABLET (0.5 MG TOTAL) BY MOUTH 2 (TWO) TIMES A DAY AS NEEDED FOR ANXIETY. 60 tablet 0   • aspirin 81 MG tablet Take 81 mg by mouth daily     • B-D UF III MINI PEN NEEDLES 31G  "X 5 MM MISC      • BAQSIMI TWO PACK 3 MG/DOSE POWD Use as directed   0   • BD Insulin Syringe U/F 31G X 5/16\" 0.5 ML MISC 4 (four) times a day As directed     • cholecalciferol (VITAMIN D3) 1,000 units tablet Take 2,000 Units by mouth daily     • Coenzyme Q10 (Co Q-10) 200 MG CAPS Take by mouth in the morning     • Continuous Blood Gluc Sensor (Dexcom G6 Sensor) MISC Use 1 each     • Continuous Blood Gluc Sensor (Dexcom G7 Sensor) Use 1 Device     • diclofenac sodium (VOLTAREN) 1 % Apply 2 g topically 4 (four) times a day (Patient taking differently: Apply 2 g topically if needed) 1 Tube 0   • diltiazem (TIAZAC) 300 MG 24 hr capsule Take 300 mg by mouth daily     • Docusate Sodium (DSS) 100 MG CAPS Take 1 capsule by mouth every 12 (twelve) hours     • DULoxetine (CYMBALTA) 60 mg delayed release capsule Take 1 capsule by mouth daily     • insulin aspart (NovoLOG FlexPen) 100 UNIT/ML injection pen PLEASE SEE ATTACHED FOR DETAILED DIRECTIONS     • insulin aspart (NovoLOG FlexPen) 100 UNIT/ML injection pen PLEASE SEE ATTACHED FOR DETAILED DIRECTIONS     • isosorbide mononitrate (IMDUR) 60 mg 24 hr tablet TAKE 1.5 TABs AT NIGHT     • labetalol (NORMODYNE) 100 mg tablet TAKE 1 TABLET BY MOUTH DAILY AFTER LUNCH     • levothyroxine 75 mcg tablet Take 75 mcg by mouth daily     • nitroglycerin (NITROLINGUAL) 0.4 mg/spray spray   1   • rosuvastatin (CRESTOR) 20 MG tablet Take 20 mg by mouth every evening    2   • Toujeo SoloStar 300 units/mL CONCENTRATED U-300 injection pen (1-unit dial) 30 Units in the morning     • venlafaxine (EFFEXOR-XR) 37.5 mg 24 hr capsule TAKE 1 CAPSULE BY MOUTH DAILY WITH BREAKFAST. 90 capsule 1   • irbesartan (AVAPRO) 300 mg tablet Take 300 mg by mouth       No current facility-administered medications for this visit.     Allergies   Allergen Reactions   • Molds & Smuts Allergic Rhinitis   • Other Allergic Rhinitis     RAGWEED, CAT DANDER, DOG DANDER    • Pollen Extract Other (See Comments)     Cold " symptoms        Immunizations:     Immunization History   Administered Date(s) Administered   • COVID-19 MODERNA VACC 0.5 ML IM 02/03/2021, 03/03/2021, 12/21/2021   • COVID-19 Moderna mRNA Vaccine 12 Yr+ 50 mcg/0.5 mL (Spikevax) 12/13/2023   • INFLUENZA 11/04/2008, 11/01/2011   • Influenza Split High Dose Preservative Free IM 10/07/2015, 12/01/2016, 10/02/2017   • Influenza, high dose seasonal 0.7 mL 09/07/2018, 10/12/2019, 09/17/2020, 11/18/2021, 10/01/2022, 10/07/2023   • Influenza, seasonal, injectable 10/20/2012, 10/05/2013, 09/16/2014   • Pneumococcal Conjugate 13-Valent 12/17/2014   • Pneumococcal Polysaccharide PPV23 01/18/2022   • Tdap 08/24/2020      Health Maintenance:         Topic Date Due   • Breast Cancer Screening: Mammogram  05/10/2018   • Colorectal Cancer Screening  05/30/2022   • Hepatitis C Screening  Completed     There are no preventive care reminders to display for this patient.   Medicare Screening Tests and Risk Assessments:     Yajaira is here for her Subsequent Wellness visit.     Health Risk Assessment:   Patient rates overall health as good. Patient feels that their physical health rating is same. Patient is satisfied with their life. Eyesight was rated as same. Hearing was rated as same. Patient feels that their emotional and mental health rating is same. Patients states they are never, rarely angry. Patient states they are never, rarely unusually tired/fatigued. Pain experienced in the last 7 days has been none. Patient states that she has experienced no weight loss or gain in last 6 months.     Fall Risk Screening:   In the past year, patient has experienced: no history of falling in past year      Urinary Incontinence Screening:   Patient has not leaked urine accidently in the last six months.     Home Safety:  Patient does not have trouble with stairs inside or outside of their home. Patient has working smoke alarms and has working carbon monoxide detector. Home safety hazards  "include: none.     Nutrition:   Current diet is Regular and Diabetic.     Medications:   Patient is currently taking over-the-counter supplements. OTC medications include: see medication list. Patient is able to manage medications.     Activities of Daily Living (ADLs)/Instrumental Activities of Daily Living (IADLs):   Walk and transfer into and out of bed and chair?: Yes  Dress and groom yourself?: Yes    Bathe or shower yourself?: Yes    Feed yourself? Yes  Do your laundry/housekeeping?: Yes  Manage your money, pay your bills and track your expenses?: Yes  Make your own meals?: Yes    Do your own shopping?: Yes    Previous Hospitalizations:   Any hospitalizations or ED visits within the last 12 months?: Yes    How many hospitalizations have you had in the last year?: 1-2    Advance Care Planning:   Living will: Yes    Durable POA for healthcare: Yes    Advanced directive: Yes      Cognitive Screening:   Provider or family/friend/caregiver concerned regarding cognition?: No    PREVENTIVE SCREENINGS      Cardiovascular Screening:    General: Screening Not Indicated and History Lipid Disorder      Diabetes Screening:     General: Screening Not Indicated and History Diabetes      Cervical Cancer Screening:    General: Screening Not Indicated      Lung Cancer Screening:     General: Screening Not Indicated      Hepatitis C Screening:    General: Screening Current    Screening, Brief Intervention, and Referral to Treatment (SBIRT)    Screening  Typical number of drinks in a day: 0  Typical number of drinks in a week: 0  Interpretation: Low risk drinking behavior.    Single Item Drug Screening:  How often have you used an illegal drug (including marijuana) or a prescription medication for non-medical reasons in the past year? never    Single Item Drug Screen Score: 0  Interpretation: Negative screen for possible drug use disorder    No results found.     Physical Exam:     /78   Pulse 63   Resp 16   Ht 4' 11\" " (1.499 m)   Wt 85.7 kg (189 lb)   SpO2 98%   BMI 38.17 kg/m²     Physical Exam  Vitals and nursing note reviewed.   Constitutional:       General: She is not in acute distress.     Appearance: Normal appearance. She is well-developed. She is obese. She is not ill-appearing, toxic-appearing or diaphoretic.   HENT:      Head: Normocephalic and atraumatic.      Right Ear: External ear normal.      Left Ear: External ear normal.      Nose: Nose normal.   Eyes:      Pupils: Pupils are equal, round, and reactive to light.   Cardiovascular:      Rate and Rhythm: Normal rate and regular rhythm.      Heart sounds: Normal heart sounds. No murmur heard.  Pulmonary:      Effort: Pulmonary effort is normal.      Breath sounds: Normal breath sounds.   Abdominal:      General: There is no distension.      Palpations: Abdomen is soft.      Tenderness: There is no abdominal tenderness. There is no guarding.   Neurological:      Mental Status: She is alert.   Psychiatric:         Mood and Affect: Mood is not anxious or depressed.         Thought Content: Thought content does not include suicidal ideation.     Psoriatic rash lidia Clemens,

## 2024-01-26 PROBLEM — R21 RASH: Status: ACTIVE | Noted: 2021-09-01

## 2024-01-26 PROBLEM — Z00.00 MEDICARE ANNUAL WELLNESS VISIT, SUBSEQUENT: Status: ACTIVE | Noted: 2024-01-26

## 2024-01-26 NOTE — ASSESSMENT & PLAN NOTE
Lab Results   Component Value Date    EGFR 32 11/14/2023    EGFR 30 09/02/2023    EGFR 26 09/01/2023    CREATININE 1.54 (H) 11/14/2023    CREATININE 1.64 (H) 09/02/2023    CREATININE 1.85 (H) 09/01/2023   Drink adequate amount of water, avoid anti-inflammatories, reduce sodium in the diet and will continue to monitor the kidney function

## 2024-01-26 NOTE — ASSESSMENT & PLAN NOTE
Clinically stable and doing well continue the current medical regiment will continue monitor.  Patient did not tolerate the higher dose of Effexor she will continue with the low-dose Effexor XR 37.5 once daily

## 2024-01-26 NOTE — ASSESSMENT & PLAN NOTE
Hypertension - controlled, I have counseled patient following healthy balance diet, I would like the patient reduce sodium, exercise routinely, I would like the patient continued the med current medical regiment and we will continue to monitor.  Blood pressure 128/78

## 2024-01-26 NOTE — ASSESSMENT & PLAN NOTE
Assessment and plan 1.  Medicare subsequent annual wellness examination overall the patient is clinically stable and doing well, we encouraged the patient to follow a healthy and balanced diet.  We recommend that the patient exercise routinely approximately 30 minutes 5 times per week .  We have reviewed the patient's vaccines and have made recommendations for updates if necessary annual flu shot    .  We will be ordering screening laboratories which are age appropriate.  Return to the office in   4 months   call if any problems.

## 2024-01-26 NOTE — ASSESSMENT & PLAN NOTE
Lab Results   Component Value Date    HGBA1C 11.8 (H) 11/06/2023   I have counselled the pt to follow a healthy and balanced diet ,and recommend routine exercise.  I will be ordering diabetic laboratories including comprehensive metabolic panel, hemoglobin A1c, urine microalbumin, lipid panel.  Patient is working with endocrinology target A1c under 8

## 2024-02-12 DIAGNOSIS — F41.0 PANIC ATTACKS: ICD-10-CM

## 2024-02-12 DIAGNOSIS — F41.1 GAD (GENERALIZED ANXIETY DISORDER): ICD-10-CM

## 2024-02-12 DIAGNOSIS — F41.9 ANXIETY: ICD-10-CM

## 2024-02-12 RX ORDER — ALPRAZOLAM 0.5 MG/1
0.5 TABLET ORAL 2 TIMES DAILY PRN
Qty: 60 TABLET | Refills: 0 | Status: SHIPPED | OUTPATIENT
Start: 2024-02-12

## 2024-02-12 NOTE — TELEPHONE ENCOUNTER
Reason for call:   [x] Refill   [] Prior Auth  [] Other:     Office:   [x] PCP/Provider -   [] Specialty/Provider -     Medication:   Alprazolam 0.5mg- take 1 tablet by mouth 2 times a day as needed for anxiety       Pharmacy: [facility]    Does the patient have enough for 3 days?   [] Yes   [x] No - Send as HP to POD

## 2024-02-21 PROBLEM — Z00.00 MEDICARE ANNUAL WELLNESS VISIT, SUBSEQUENT: Status: RESOLVED | Noted: 2024-01-26 | Resolved: 2024-02-21

## 2024-03-13 DIAGNOSIS — F41.0 PANIC ATTACKS: ICD-10-CM

## 2024-03-13 DIAGNOSIS — F41.9 ANXIETY: ICD-10-CM

## 2024-03-13 DIAGNOSIS — F41.1 GAD (GENERALIZED ANXIETY DISORDER): ICD-10-CM

## 2024-03-13 RX ORDER — ALPRAZOLAM 0.5 MG/1
0.5 TABLET ORAL 2 TIMES DAILY PRN
Qty: 60 TABLET | Refills: 0 | Status: SHIPPED | OUTPATIENT
Start: 2024-03-13

## 2024-03-13 NOTE — TELEPHONE ENCOUNTER
Reason for call:   [x] Refill   [] Prior Auth  [] Other:     Office:   [x] PCP/Provider - Sarath   [] Specialty/Provider -     Medication: Alprazolam     Dose/Frequency: 0.25 mg - two times daily     Quantity: 60    Pharmacy: [id number]     Does the patient have enough for 3 days?   [] Yes   [x] No - Send as HP to POD

## 2024-04-16 DIAGNOSIS — F41.0 PANIC ATTACKS: ICD-10-CM

## 2024-04-16 DIAGNOSIS — F41.1 GAD (GENERALIZED ANXIETY DISORDER): ICD-10-CM

## 2024-04-16 DIAGNOSIS — F41.9 ANXIETY: ICD-10-CM

## 2024-04-16 RX ORDER — ALPRAZOLAM 0.5 MG/1
0.5 TABLET ORAL 2 TIMES DAILY PRN
Qty: 60 TABLET | Refills: 0 | Status: SHIPPED | OUTPATIENT
Start: 2024-04-16

## 2024-04-16 NOTE — TELEPHONE ENCOUNTER
Refill must be reviewed and completed by the office or provider. The refill is unable to be approved or denied by the medication management team.    Cannot be delegated

## 2024-04-17 ENCOUNTER — RA CDI HCC (OUTPATIENT)
Dept: OTHER | Facility: HOSPITAL | Age: 79
End: 2024-04-17

## 2024-04-17 PROBLEM — I73.9 VASCULAR CLAUDICATION (HCC): Status: RESOLVED | Noted: 2017-12-11 | Resolved: 2024-04-17

## 2024-04-17 NOTE — PROGRESS NOTES
HCC coding opportunities          Chart Reviewed number of suggestions sent to Provider: 1   E11.22    Patients Insurance     Medicare Insurance: [employer]

## 2024-04-23 ENCOUNTER — OFFICE VISIT (OUTPATIENT)
Dept: INTERNAL MEDICINE CLINIC | Facility: CLINIC | Age: 79
End: 2024-04-23
Payer: COMMERCIAL

## 2024-04-23 VITALS
DIASTOLIC BLOOD PRESSURE: 60 MMHG | BODY MASS INDEX: 39.51 KG/M2 | WEIGHT: 196 LBS | SYSTOLIC BLOOD PRESSURE: 146 MMHG | HEIGHT: 59 IN

## 2024-04-23 DIAGNOSIS — E03.9 HYPOTHYROIDISM, UNSPECIFIED TYPE: Primary | ICD-10-CM

## 2024-04-23 DIAGNOSIS — L40.50 PSORIASIS WITH ARTHROPATHY (HCC): ICD-10-CM
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
## 2024-04-23 DIAGNOSIS — Z79.4 TYPE 2 DIABETES MELLITUS WITH COMPLICATION, WITH LONG-TERM CURRENT USE OF INSULIN (HCC): ICD-10-CM

## 2024-04-23 DIAGNOSIS — M54.2 NECK PAIN: ICD-10-CM

## 2024-04-23 DIAGNOSIS — F41.9 ANXIETY: ICD-10-CM

## 2024-04-23 DIAGNOSIS — E78.2 MIXED HYPERLIPIDEMIA: Chronic | ICD-10-CM

## 2024-04-23 DIAGNOSIS — F33.2 MDD (MAJOR DEPRESSIVE DISORDER), RECURRENT SEVERE, WITHOUT PSYCHOSIS (HCC): ICD-10-CM

## 2024-04-23 DIAGNOSIS — E11.8 TYPE 2 DIABETES MELLITUS WITH COMPLICATION, WITH LONG-TERM CURRENT USE OF INSULIN (HCC): ICD-10-CM

## 2024-04-23 DIAGNOSIS — L21.9 SEBORRHEIC DERMATITIS: ICD-10-CM

## 2024-04-23 PROCEDURE — 99214 OFFICE O/P EST MOD 30 MIN: CPT | Performed by: INTERNAL MEDICINE

## 2024-04-23 PROCEDURE — G2211 COMPLEX E/M VISIT ADD ON: HCPCS | Performed by: INTERNAL MEDICINE

## 2024-04-23 RX ORDER — KETOCONAZOLE 20 MG/ML
1 SHAMPOO TOPICAL ONCE
Qty: 100 ML | Refills: 0 | Status: SHIPPED | OUTPATIENT
Start: 2024-04-23 | End: 2024-05-03

## 2024-04-23 RX ORDER — PROCHLORPERAZINE 25 MG/1
1 SUPPOSITORY RECTAL
Qty: 1 EACH | Refills: 0 | Status: SHIPPED | OUTPATIENT
Start: 2024-04-23

## 2024-04-23 RX ORDER — INSULIN GLARGINE 300 U/ML
62 INJECTION, SOLUTION SUBCUTANEOUS DAILY
Start: 2024-04-23

## 2024-04-23 RX ORDER — VENLAFAXINE HYDROCHLORIDE 75 MG/1
75 CAPSULE, EXTENDED RELEASE ORAL DAILY
Qty: 90 CAPSULE | Refills: 1 | Status: SHIPPED | OUTPATIENT
Start: 2024-04-23

## 2024-04-23 RX ORDER — VENLAFAXINE HYDROCHLORIDE 37.5 MG/1
75 CAPSULE, EXTENDED RELEASE ORAL DAILY
Qty: 90 CAPSULE | Refills: 1 | Status: SHIPPED | OUTPATIENT
Start: 2024-04-23 | End: 2024-04-23

## 2024-04-23 NOTE — PROGRESS NOTES
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 Miller    Hyperlipidemia  Hyperlipidemia controlled continue with current medical regiment recommend a low-cholesterol diet and recommend routine exercise we will continue to monitor the progress.    Neck pain  Patient reports to me for recent falls she did not present to ER she reports me hitting her head on the cement and also neck pain currently no headache no concussive symptoms the last fall was several weeks ago I explained to the patient she will need to go for a CAT scan of her head and neck, I explained to the patient if this happens again to go immediately to ER and that she should have went to the ER initially.  I will have the patient's start physical therapy for balance, strengthening use a cane she does have a walker at home    Hypothyroidism  Hypothyroidism controlled the patient is currently euthyroid I will be ordering a TSH prior to the next office visit and the patient will continue with current medical regiment; we will continue to monitor the patient's progress.    Psoriasis with arthropathy (HCC)  Clinically stable and doing well continue the current medical regiment will continue monitor.    Anxiety  Clinically stable working psychiatry    Fall  I have counseled patient about fall prevention I would like the patient to start physical therapy for balance and strengthening using a cane or walker    Type 2 diabetes mellitus with complication, with long-term current use of insulin (HCC)  I have counselled the pt to follow a healthy and balanced diet ,and recommend routine exercise.  Lab Results   Component Value Date    HGBA1C 11.9 (H) 04/22/2024   I will be ordering diabetic laboratories including comprehensive metabolic panel, hemoglobin A1c, urine microalbumin, lipid panel.  Examination recommended    Seborrheic dermatitis  Rx for Nizoral AD shampoo to  continue once a day as needed likely the flaking is leading to itching at the back         Problem List Items Addressed This Visit        Endocrine    Hypothyroidism - Primary     Hypothyroidism controlled the patient is currently euthyroid I will be ordering a TSH prior to the next office visit and the patient will continue with current medical regiment; we will continue to monitor the patient's progress.         Relevant Medications    Toujeo SoloStar 300 units/mL CONCENTRATED U-300 injection pen (1-unit dial)    Type 2 diabetes mellitus with complication, with long-term current use of insulin (HCC)     I have counselled the pt to follow a healthy and balanced diet ,and recommend routine exercise.  Lab Results   Component Value Date    HGBA1C 11.9 (H) 04/22/2024   I will be ordering diabetic laboratories including comprehensive metabolic panel, hemoglobin A1c, urine microalbumin, lipid panel.  Examination recommended         Relevant Medications    Toujeo SoloStar 300 units/mL CONCENTRATED U-300 injection pen (1-unit dial)       Musculoskeletal and Integument    Psoriasis with arthropathy (HCC)     Clinically stable and doing well continue the current medical regiment will continue monitor.         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 Miller         Relevant Medications    venlafaxine (EFFEXOR-XR) 75 mg 24 hr capsule    Anxiety     Clinically stable working psychiatry         Relevant Medications    venlafaxine (EFFEXOR-XR) 75 mg 24 hr capsule    Other Relevant Orders    Ambulatory referral to Psych Services       Surgery/Wound/Pain    Neck pain     Patient reports to me for recent falls she did not  present to ER she reports me hitting her head on the cement and also neck pain currently no headache no concussive symptoms the last fall was several weeks ago I explained to the patient she will need to go for a CAT scan of her head and neck, I explained to the patient if this happens again to go immediately to ER and that she should have went to the ER initially.  I will have the patient's start physical therapy for balance, strengthening use a cane she does have a walker at home         Relevant Orders    CT head wo contrast    XR spine cervical complete 4 or 5 vw non injury    Ambulatory Referral to Physical Therapy       Other    Hyperlipidemia (Chronic)     Hyperlipidemia controlled continue with current medical regiment recommend a low-cholesterol diet and recommend routine exercise we will continue to monitor the progress.         Fall     I have counseled patient about fall prevention I would like the patient to start physical therapy for balance and strengthening using a cane or walker         Relevant Orders    CT head wo contrast    XR spine cervical complete 4 or 5 vw non injury    Ambulatory Referral to Physical Therapy           Subjective:      Patient ID: Yajaira Marsh is a 78 y.o. female.    HPI 78-year old female coming in for a follow up office visit regarding hypothyroidism, type 2 diabetes, multiple falls, neck pain from fall, anxiety/depression, hyperlipidemia, seborrheic dermatitis scalp and itching of the back; the patient reports me compliant taking medications without untoward side effects the.  The patient is here to review his medical condition, update me on the medical condition and the patient reports me no hospitalizations and no ER visits.  Patient does report to me multiple falls she did hit her head and does have mild neck pain last fall several weeks ago she did not present to the ER she is concerned about going to a nursing home and not being released from the hospital.   Patient denies any difficulty with her memory no problem with vision no headache no difficulty with concussive type symptoms she does report mild neck pain she has not had any imaging.  She reports that in the past she did not complete balance therapy secondary to co-pay physical therapy.  She reports me a itchy rash of the scalp flaking the pt reports glucose high since change of the cgm and hospitalization, now getting insulin per pen saw wilda Oliva earlier today ,  4 falls , fell backwards refuses er hit the back of the head no ha, can got stuck on grass at     Flaking of scalp white , irritating, itching back    The following portions of the patient's history were reviewed and updated as appropriate: allergies, current medications, past family history, past medical history, past social history, past surgical history and problem list.    Review of Systems   Constitutional:  Negative for activity change, appetite change and unexpected weight change.   HENT:  Negative for congestion and postnasal drip.    Eyes:  Negative for visual disturbance.   Respiratory:  Negative for cough and shortness of breath.    Cardiovascular:  Negative for chest pain.   Gastrointestinal:  Negative for abdominal pain, diarrhea, nausea and vomiting.   Musculoskeletal:  Positive for neck pain.   Neurological:  Negative for dizziness, light-headedness and headaches.   Hematological:  Negative for adenopathy.   Psychiatric/Behavioral:  Negative for suicidal ideas. The patient is nervous/anxious.     Scalp flaking, back itching    Objective:    No follow-ups on file.    No results found.      Allergies   Allergen Reactions   • Molds & Smuts Allergic Rhinitis   • Other Allergic Rhinitis     RAGWEED, CAT DANDER, DOG DANDER    • Pollen Extract Other (See Comments)     Cold symptoms         Past Medical History:   Diagnosis Date   • Acute embolism and thrombosis of unspecified deep veins of unspecified lower extremity (HCC)     Last  Assessed:  5/18/17   • Anemia    • Anosmia    • Anxiety    • Arthritis    • Asthma    • Back pain    • Bilateral macular retinal edema    • CAD (coronary artery disease)    • Cataract    • Cervical disc herniation    • Cervical radiculopathy    • Cervical spinal stenosis    • Cervical spondylolysis    • Chronic kidney disease    • Chronic mastoiditis    • Colon polyp    • Complex endometrial hyperplasia    • Depression    • Diabetes mellitus (HCC)    • Disease of thyroid gland    • DVT (deep venous thrombosis) ([facility])    • DVT (deep venous thrombosis) ([facility]) 06/16/2017   • Fibromyalgia    • Fibromyalgia, primary    • Hyperlipidemia    • Hypertension    • Hypothyroid    • Kidney stone    • Lumbar radiculopathy    • Neck pain    • Obese    • PONV (postoperative nausea and vomiting)    • Shingles 07/01/2021   • Spinal stenosis    • Stomach ulcer    • Thyroid disease    • Vascular claudication ([facility]) 12/11/2017     Past Surgical History:   Procedure Laterality Date   • BACK SURGERY     • CARPAL TUNNEL RELEASE     • CATARACT EXTRACTION     • CHOLECYSTECTOMY     • COLONOSCOPY     • CORONARY ANGIOPLASTY     • CORONARY ARTERY BYPASS GRAFT     • CYSTOSCOPY N/A 6/20/2017    Procedure: CYSTOSCOPY;  Surgeon: Tacho Arnold MD;  Location: [facility];  Service: Gynecology Oncology   • CYSTOSCOPY     • WY LAPS TOTAL HYSTERECT 250 GM/< W/RMVL TUBE/OVARY N/A 6/20/2017    Procedure: ROBOTIC HYSTERECTOMY; BILATERAL SALPINO-OOPHERECTOMY; umbilical hernia repair.;  Surgeon: Tacho Arnold MD;  Location: [facility];  Service: Gynecology Oncology   • WY REPAIR FIRST ABDOMINAL WALL HERNIA N/A 5/12/2022    Procedure: REPAIR HERNIA INCISIONAL;  Surgeon: Horacio Scherer DO;  Location: [facility];  Service: General   • TONSILECTOMY AND ADNOIDECTOMY     • TONSILLECTOMY     • UMBILICAL HERNIA REPAIR       Current Outpatient Medications on File Prior to Visit   Medication Sig Dispense Refill   • ALPRAZolam (XANAX) 0.5 mg tablet Take 1 tablet  "(0.5 mg total) by mouth 2 (two) times a day as needed for anxiety 60 tablet 0   • aspirin 81 MG tablet Take 81 mg by mouth daily     • B-D UF III MINI PEN NEEDLES 31G X 5 MM MISC      • BAQSIMI TWO PACK 3 MG/DOSE POWD Use as directed   0   • BD Insulin Syringe U/F 31G X 5/16\" 0.5 ML MISC 4 (four) times a day As directed     • cholecalciferol (VITAMIN D3) 1,000 units tablet Take 2,000 Units by mouth daily     • Coenzyme Q10 (Co Q-10) 200 MG CAPS Take by mouth in the morning     • Continuous Blood Gluc Sensor (Dexcom G7 Sensor) Use 1 Device     • diclofenac sodium (VOLTAREN) 1 % Apply 2 g topically 4 (four) times a day (Patient taking differently: Apply 2 g topically if needed) 1 Tube 0   • diltiazem (TIAZAC) 300 MG 24 hr capsule Take 300 mg by mouth daily     • Docusate Sodium (DSS) 100 MG CAPS Take 1 capsule by mouth every 12 (twelve) hours     • insulin aspart (NovoLOG FlexPen) 100 UNIT/ML injection pen PLEASE SEE ATTACHED FOR DETAILED DIRECTIONS     • insulin aspart (NovoLOG FlexPen) 100 UNIT/ML injection pen Inject 18 Units under the skin 2 (two) times a day with meals     • isosorbide mononitrate (IMDUR) 60 mg 24 hr tablet TAKE 1.5 TABs AT NIGHT     • levothyroxine 75 mcg tablet Take 75 mcg by mouth daily     • nitroglycerin (NITROLINGUAL) 0.4 mg/spray spray   1   • rosuvastatin (CRESTOR) 20 MG tablet Take 20 mg by mouth every evening    2   • [DISCONTINUED] Continuous Blood Gluc Sensor (Dexcom G6 Sensor) MISC Use 1 each     • [DISCONTINUED] DULoxetine (CYMBALTA) 60 mg delayed release capsule Take 1 capsule by mouth daily     • [DISCONTINUED] Toujeo SoloStar 300 units/mL CONCENTRATED U-300 injection pen (1-unit dial) 30 Units in the morning     • [DISCONTINUED] venlafaxine (EFFEXOR-XR) 37.5 mg 24 hr capsule TAKE 1 CAPSULE BY MOUTH DAILY WITH BREAKFAST. 90 capsule 1   • irbesartan (AVAPRO) 300 mg tablet Take 300 mg by mouth     • labetalol (NORMODYNE) 100 mg tablet TAKE 1 TABLET BY MOUTH DAILY AFTER LUNCH " (Patient not taking: Reported on 2024)       No current facility-administered medications on file prior to visit.     Family History   Problem Relation Age of Onset   • Arthritis Mother    • Leukemia Mother    • Other Mother         Anxiety, major depressive disorder, recurrent episode with atypical features   • Coronary artery disease Father         Heart problem   • Diabetes Father    • Other Father         Infectious disease   • Alzheimer's disease Maternal Grandmother    • Other Maternal Grandfather         Heart problem   • Other Daughter         Anxiety, major depressive disorder, recurrent episode with atypical features   • Alcohol abuse Other         Grandparent   • Cancer Family    • Diabetes Family    • Hypertension Family    • Other Family         Reported prior back trouble, thyroid disorder     Social History     Socioeconomic History   • Marital status: /Civil Union     Spouse name: Not on file   • Number of children: 2   • Years of education: High school or GED   • Highest education level: Not on file   Occupational History   • Occupation: Retired   Tobacco Use   • Smoking status: Former     Current packs/day: 0.00     Average packs/day: 1 pack/day for 6.0 years (6.0 ttl pk-yrs)     Types: Cigarettes     Start date:      Quit date:      Years since quittin.3   • Smokeless tobacco: Never   • Tobacco comments:     Smoked about a half a pack for about 4 yrs.  Quite about 50 yrs ago.   Vaping Use   • Vaping status: Never Used   Substance and Sexual Activity   • Alcohol use: No   • Drug use: No   • Sexual activity: Not Currently     Comment: No known STD risk factors   Other Topics Concern   • Not on file   Social History Narrative    Lives independently with spouse    No known risk factors    Denied:  Exercising regularly     Social Determinants of Health     Financial Resource Strain: Low Risk  (2024)    Overall Financial Resource Strain (CARDIA)    • Difficulty of Paying  "Living Expenses: Not hard at all   Food Insecurity: No Food Insecurity (9/7/2023)    Received from [facility]    Hunger Vital Sign    • Worried About Running Out of Food in the Last Year: Never true    • Ran Out of Food in the Last Year: Never true   Transportation Needs: No Transportation Needs (1/24/2024)    PRAPARE - Transportation    • Lack of Transportation (Medical): No    • Lack of Transportation (Non-Medical): No   Physical Activity: Not on file   Stress: Not on file   Social Connections: Not on file   Intimate Partner Violence: Not At Risk (9/7/2023)    Received from [facility]    Humiliation, Afraid, Rape, and Kick questionnaire    • Fear of Current or Ex-Partner: No    • Emotionally Abused: No    • Physically Abused: No    • Sexually Abused: No   Housing Stability: Low Risk  (9/7/2023)    Received from [facility]    Housing Stability Vital Sign    • Unable to Pay for Housing in the Last Year: No    • Number of Places Lived in the Last Year: 1    • Unstable Housing in the Last Year: No     Vitals:    04/23/24 1644   BP: 146/60   BP Location: Left arm   Patient Position: Sitting   Cuff Size: Large   Weight: 88.9 kg (196 lb)   Height: 4' 11\" (1.499 m)     Results for orders placed or performed during the hospital encounter of 11/14/23   CBC and differential   Result Value Ref Range    WBC 10.22 (H) 4.31 - 10.16 Thousand/uL    RBC 4.59 3.81 - 5.12 Million/uL    Hemoglobin 12.9 11.5 - 15.4 g/dL    Hematocrit 39.5 34.8 - 46.1 %    MCV 86 82 - 98 fL    MCH 28.1 26.8 - 34.3 pg    MCHC 32.7 31.4 - 37.4 g/dL    RDW 14.5 11.6 - 15.1 %    MPV 11.7 8.9 - 12.7 fL    Platelets 255 149 - 390 Thousands/uL    nRBC 0 /100 WBCs    Segmented % 63 43 - 75 %    Immature Grans % 1 0 - 2 %    Lymphocytes % 28 14 - 44 %    Monocytes % 7 4 - 12 %    Eosinophils Relative 0 0 - 6 %    Basophils Relative 1 0 - 1 %    Absolute Neutrophils 6.58 1.85 - 7.62 Thousands/µL    Absolute " "Immature Grans 0.05 0.00 - 0.20 Thousand/uL    Absolute Lymphocytes 2.81 0.60 - 4.47 Thousands/µL    Absolute Monocytes 0.71 0.17 - 1.22 Thousand/µL    Eosinophils Absolute 0.02 0.00 - 0.61 Thousand/µL    Basophils Absolute 0.05 0.00 - 0.10 Thousands/µL   TSH, 3rd generation with Free T4 reflex   Result Value Ref Range    TSH 3RD GENERATON 3.003 0.450 - 4.500 uIU/mL   Comprehensive metabolic panel   Result Value Ref Range    Sodium 136 135 - 147 mmol/L    Potassium 4.5 3.5 - 5.3 mmol/L    Chloride 102 96 - 108 mmol/L    CO2 22 21 - 32 mmol/L    ANION GAP 12 mmol/L    BUN 20 5 - 25 mg/dL    Creatinine 1.54 (H) 0.60 - 1.30 mg/dL    Glucose 191 (H) 65 - 140 mg/dL    Calcium 10.0 8.4 - 10.2 mg/dL    AST 41 (H) 13 - 39 U/L    ALT 30 7 - 52 U/L    Alkaline Phosphatase 119 (H) 34 - 104 U/L    Total Protein 8.3 6.4 - 8.4 g/dL    Albumin 4.3 3.5 - 5.0 g/dL    Total Bilirubin 0.73 0.20 - 1.00 mg/dL    eGFR 32 ml/min/1.73sq m   Fingerstick Glucose (POCT)   Result Value Ref Range    POC Glucose 219 (H) 65 - 140 mg/dl     *Note: Due to a large number of results and/or encounters for the requested time period, some results have not been displayed. A complete set of results can be found in Results Review.     Weight (last 2 days)     Date/Time Weight    04/23/24 1644 88.9 (196)        Body mass index is 39.59 kg/m².  BP      Temp      Pulse     Resp      SpO2        Vitals:    04/23/24 1644   Weight: 88.9 kg (196 lb)     Vitals:    04/23/24 1644   Weight: 88.9 kg (196 lb)       /60 (BP Location: Left arm, Patient Position: Sitting, Cuff Size: Large)   Ht 4' 11\" (1.499 m)   Wt 88.9 kg (196 lb)   BMI 39.59 kg/m²          Physical Exam  Vitals and nursing note reviewed.   Constitutional:       General: She is not in acute distress.     Appearance: Normal appearance. She is well-developed. She is obese. She is not ill-appearing, toxic-appearing or diaphoretic.   HENT:      Head: Normocephalic.   Eyes:      General: No scleral " icterus.        Right eye: No discharge.         Left eye: No discharge.      Conjunctiva/sclera: Conjunctivae normal.      Pupils: Pupils are equal, round, and reactive to light.   Cardiovascular:      Rate and Rhythm: Normal rate and regular rhythm.      Heart sounds: Normal heart sounds. No murmur heard.     No friction rub. No gallop.   Pulmonary:      Effort: No respiratory distress.      Breath sounds: Normal breath sounds. No wheezing or rales.   Abdominal:      General: Bowel sounds are normal. There is no distension.      Palpations: Abdomen is soft. There is no mass.      Tenderness: There is no abdominal tenderness. There is no guarding or rebound.   Musculoskeletal:         General: No deformity.      Cervical back: Neck supple.   Lymphadenopathy:      Cervical: No cervical adenopathy.   Neurological:      Mental Status: She is alert.      Coordination: Coordination normal.   Psychiatric:         Mood and Affect: Mood is anxious. Mood is not depressed.         Thought Content: Thought content does not include suicidal ideation.      Seborrheic dermatitis scalp flaking moderate

## 2024-04-24 NOTE — ASSESSMENT & PLAN NOTE
I have counseled patient about fall prevention I would like the patient to start physical therapy for balance and strengthening using a cane or walker

## 2024-04-24 NOTE — ASSESSMENT & PLAN NOTE
Patient reports to me for recent falls she did not present to ER she reports me hitting her head on the cement and also neck pain currently no headache no concussive symptoms the last fall was several weeks ago I explained to the patient she will need to go for a CAT scan of her head and neck, I explained to the patient if this happens again to go immediately to ER and that she should have went to the ER initially.  I will have the patient's start physical therapy for balance, strengthening use a cane she does have a walker at home

## 2024-04-24 NOTE — ASSESSMENT & PLAN NOTE
I have counselled the pt to follow a healthy and balanced diet ,and recommend routine exercise.  Lab Results   Component Value Date    HGBA1C 11.9 (H) 04/22/2024   I will be ordering diabetic laboratories including comprehensive metabolic panel, hemoglobin A1c, urine microalbumin, lipid panel.  Examination recommended

## 2024-04-24 NOTE — ASSESSMENT & PLAN NOTE
Rx for Nizoral AD shampoo to continue once a day as needed likely the flaking is leading to itching at the back

## 2024-04-29 ENCOUNTER — TELEPHONE (OUTPATIENT)
Dept: NEPHROLOGY | Facility: CLINIC | Age: 79
End: 2024-04-29

## 2024-04-30 NOTE — PROGRESS NOTES
NEPHROLOGY OFFICE VISIT   Yajaira Marsh 78 y.o. female MRN: [id number]  5/3/2024    Reason for Visit: Follow up    INTERVAL HISTORY and SUBJECTIVE:    I had the pleasure of seeing Yajaira today in the renal clinic. She is a 78-year-old female who follows with Dr. Arreola for management of CKD.  She was last seen in the nephrology clinic June 2023.    Since last visit: Problems with blood sugars. Problems with the dexcon monitor    Last labs obtained on 4/22/2024 reviewed with Yajaira    ASSESSMENT AND PLAN:    Chronic kidney disease, stage IIIb:  It appears that baseline creatinine is generally been between 1.5-1.6 but occasionally up to 1.9 milligram per deciliter  Current creatinine 1.77  Persistent albuminuria which has been relatively stable since 2015.  Currently 79.  No significant change in renal function over the last 4 to 5 years  Starting low dose torsemide as needed  Check BMP in 2 weeks  Follow up in 4 months    Hypertension/volume:  BP acceptable: on irbesartan, diltiazem, isosorbide 60 mg daily  BP acceptable  LE edema- mild  Start low dose torsemide as needed, 10 mg every other day- hold if edema resolves    Diabetes mellitus type 2:  Uncontrolled.  A1c 11.9% , poorly controlled    Electrolytes/acid-base: Acceptable    Problems sleeping: Taking Effexor     Hyperlipidemia: On rosuvastatin    Other CKD monitoring: Hemoglobin normal 12.9 as of November 2023    PATIENT INSTRUCTIONS:    Patient Instructions   You are seen today for continued monitoring of kidney function:  At this time kidney function is stable, at baseline    Recommendations:  For leg and foot swelling try taking a half a tablet of Demadex/torsemide as needed.  Only take the medication as needed.  Do not take more often than 1 or 2 times a week  Follow-up blood work in approximately 2 weeks to check kidney function and electrolytes  Avoid NSAIDs such as Motrin Aleve ibuprofen and Advil.  You can take Tylenol for control of  "pain  Follow-up appointment in approximately 4 months  Blood work and urine studies prior to the appointment  Call with any questions or concerns  Call if blood pressure remains consistently greater than 140/80      Orders Placed This Encounter   Procedures    Basic metabolic panel     Standing Status:   Future     Standing Expiration Date:   5/3/2025    Comprehensive metabolic panel     This is a patient instruction: Patient fasting for 8 hours or longer recommended.     Standing Status:   Future     Standing Expiration Date:   6/1/2025    CBC     Standing Status:   Future     Standing Expiration Date:   6/1/2025    PTH, intact     Standing Status:   Future     Standing Expiration Date:   6/1/2025       OBJECTIVE:  Current Weight: Weight - Scale: 88.3 kg (194 lb 9.6 oz)  Vitals:    05/03/24 1400 05/03/24 1435   BP: 132/66 130/64   BP Location: Left arm Left arm   Patient Position: Sitting Sitting   Cuff Size: Standard    Pulse: 76    Weight: 88.3 kg (194 lb 9.6 oz)    Height: 4' 11\" (1.499 m)     Body mass index is 39.3 kg/m².      REVIEW OF SYSTEMS:    Review of Systems   Constitutional:  Negative for chills and fever.   HENT:  Negative for ear pain and sore throat.    Eyes:  Negative for pain and visual disturbance.   Respiratory:  Negative for cough and shortness of breath.    Cardiovascular:  Negative for chest pain and palpitations.   Gastrointestinal:  Negative for abdominal pain and vomiting.   Genitourinary:  Negative for dysuria and hematuria.   Musculoskeletal:  Positive for arthralgias, back pain and gait problem.   Skin:  Negative for color change and rash.   Neurological:  Negative for seizures and syncope.   All other systems reviewed and are negative.      PHYSICAL EXAM:      Physical Exam  Constitutional:       General: She is not in acute distress.     Appearance: She is well-developed. She is not ill-appearing, toxic-appearing or diaphoretic.   HENT:      Head: Normocephalic and atraumatic.      " "Nose: Nose normal. No congestion.      Mouth/Throat:      Mouth: Mucous membranes are moist.      Pharynx: No oropharyngeal exudate.   Eyes:      Extraocular Movements: Extraocular movements intact.      Conjunctiva/sclera: Conjunctivae normal.   Neck:      Thyroid: No thyromegaly.      Vascular: No carotid bruit or JVD.      Trachea: No tracheal deviation.   Cardiovascular:      Rate and Rhythm: Normal rate and regular rhythm.      Heart sounds: No murmur heard.     No friction rub. No gallop.   Pulmonary:      Effort: Pulmonary effort is normal.      Breath sounds: Normal breath sounds.   Abdominal:      General: Bowel sounds are normal. There is no distension.      Palpations: Abdomen is soft. There is no mass.      Tenderness: There is no abdominal tenderness. There is no guarding or rebound.   Musculoskeletal:         General: No swelling, tenderness or signs of injury. Normal range of motion.      Cervical back: Normal range of motion and neck supple. No rigidity or tenderness.      Right lower leg: Edema present.      Left lower leg: Edema present.      Comments: Mild ankle edema   Skin:     General: Skin is warm and dry.      Coloration: Skin is not jaundiced or pale.      Findings: No erythema or rash.   Neurological:      Mental Status: She is alert and oriented to person, place, and time.   Psychiatric:         Mood and Affect: Mood normal.         Behavior: Behavior normal.         Thought Content: Thought content normal.         Judgment: Judgment normal.         Medications:    Current Outpatient Medications:     ALPRAZolam (XANAX) 0.5 mg tablet, Take 1 tablet (0.5 mg total) by mouth 2 (two) times a day as needed for anxiety, Disp: 60 tablet, Rfl: 0    aspirin 81 MG tablet, Take 81 mg by mouth daily, Disp: , Rfl:     B-D UF III MINI PEN NEEDLES 31G X 5 MM MISC, , Disp: , Rfl:     BAQSIMI TWO PACK 3 MG/DOSE POWD, Use as directed , Disp: , Rfl: 0    BD Insulin Syringe U/F 31G X 5/16\" 0.5 ML MISC, 4 " (four) times a day As directed, Disp: , Rfl:     cholecalciferol (VITAMIN D3) 1,000 units tablet, Take 2,000 Units by mouth daily, Disp: , Rfl:     Coenzyme Q10 (Co Q-10) 200 MG CAPS, Take by mouth in the morning, Disp: , Rfl:     Continuous Blood Gluc Sensor (Dexcom G6 Sensor) MISC, Use 1 each daily at bedtime as needed (scalp itching), Disp: 1 each, Rfl: 0    Continuous Blood Gluc Sensor (Dexcom G7 Sensor), Use 1 Device, Disp: , Rfl:     diclofenac sodium (VOLTAREN) 1 %, Apply 2 g topically 4 (four) times a day (Patient taking differently: Apply 2 g topically if needed), Disp: 1 Tube, Rfl: 0    diltiazem (TIAZAC) 300 MG 24 hr capsule, Take 300 mg by mouth daily Tiddylt, Disp: , Rfl:     insulin aspart (NovoLOG FlexPen) 100 UNIT/ML injection pen, PLEASE SEE ATTACHED FOR DETAILED DIRECTIONS, Disp: , Rfl:     insulin aspart (NovoLOG FlexPen) 100 UNIT/ML injection pen, Inject 18 Units under the skin 2 (two) times a day with meals, Disp: , Rfl:     irbesartan (AVAPRO) 300 mg tablet, Take 300 mg by mouth, Disp: , Rfl:     isosorbide mononitrate (IMDUR) 60 mg 24 hr tablet, TAKE 1.5 TABs AT NIGHT, Disp: , Rfl:     ketoconazole (NIZORAL) 2 % shampoo, Apply 1 Application topically once for 1 dose, Disp: 100 mL, Rfl: 0    levothyroxine 75 mcg tablet, Take 75 mcg by mouth daily, Disp: , Rfl:     nitroglycerin (NITROLINGUAL) 0.4 mg/spray spray, , Disp: , Rfl: 1    rosuvastatin (CRESTOR) 20 MG tablet, Take 20 mg by mouth every evening  , Disp: , Rfl: 2    torsemide (DEMADEX) 20 mg tablet, Take 0.5 tablets (10 mg total) by mouth as needed (Take for leg swelling as needed), Disp: 15 tablet, Rfl: 1    Toujeo SoloStar 300 units/mL CONCENTRATED U-300 injection pen (1-unit dial), Inject 62 Units under the skin in the morning, Disp: , Rfl:     venlafaxine (EFFEXOR-XR) 75 mg 24 hr capsule, Take 1 capsule (75 mg total) by mouth daily (Patient taking differently: Take 37.5 mg by mouth daily), Disp: 90 capsule, Rfl: 1    Docusate Sodium  "(DSS) 100 MG CAPS, Take 1 capsule by mouth every 12 (twelve) hours (Patient not taking: Reported on 5/3/2024), Disp: , Rfl:     Laboratory Results:        Invalid input(s): \"ALBUMIN\"    Results for orders placed or performed during the hospital encounter of 11/14/23   CBC and differential   Result Value Ref Range    WBC 10.22 (H) 4.31 - 10.16 Thousand/uL    RBC 4.59 3.81 - 5.12 Million/uL    Hemoglobin 12.9 11.5 - 15.4 g/dL    Hematocrit 39.5 34.8 - 46.1 %    MCV 86 82 - 98 fL    MCH 28.1 26.8 - 34.3 pg    MCHC 32.7 31.4 - 37.4 g/dL    RDW 14.5 11.6 - 15.1 %    MPV 11.7 8.9 - 12.7 fL    Platelets 255 149 - 390 Thousands/uL    nRBC 0 /100 WBCs    Segmented % 63 43 - 75 %    Immature Grans % 1 0 - 2 %    Lymphocytes % 28 14 - 44 %    Monocytes % 7 4 - 12 %    Eosinophils Relative 0 0 - 6 %    Basophils Relative 1 0 - 1 %    Absolute Neutrophils 6.58 1.85 - 7.62 Thousands/µL    Absolute Immature Grans 0.05 0.00 - 0.20 Thousand/uL    Absolute Lymphocytes 2.81 0.60 - 4.47 Thousands/µL    Absolute Monocytes 0.71 0.17 - 1.22 Thousand/µL    Eosinophils Absolute 0.02 0.00 - 0.61 Thousand/µL    Basophils Absolute 0.05 0.00 - 0.10 Thousands/µL   TSH, 3rd generation with Free T4 reflex   Result Value Ref Range    TSH 3RD GENERATON 3.003 0.450 - 4.500 uIU/mL   Comprehensive metabolic panel   Result Value Ref Range    Sodium 136 135 - 147 mmol/L    Potassium 4.5 3.5 - 5.3 mmol/L    Chloride 102 96 - 108 mmol/L    CO2 22 21 - 32 mmol/L    ANION GAP 12 mmol/L    BUN 20 5 - 25 mg/dL    Creatinine 1.54 (H) 0.60 - 1.30 mg/dL    Glucose 191 (H) 65 - 140 mg/dL    Calcium 10.0 8.4 - 10.2 mg/dL    AST 41 (H) 13 - 39 U/L    ALT 30 7 - 52 U/L    Alkaline Phosphatase 119 (H) 34 - 104 U/L    Total Protein 8.3 6.4 - 8.4 g/dL    Albumin 4.3 3.5 - 5.0 g/dL    Total Bilirubin 0.73 0.20 - 1.00 mg/dL    eGFR 32 ml/min/1.73sq m   Fingerstick Glucose (POCT)   Result Value Ref Range    POC Glucose 219 (H) 65 - 140 mg/dl        "

## 2024-05-03 ENCOUNTER — OFFICE VISIT (OUTPATIENT)
Dept: NEPHROLOGY | Facility: CLINIC | Age: 79
End: 2024-05-03
Payer: COMMERCIAL

## 2024-05-03 VITALS
WEIGHT: 194.6 LBS | HEIGHT: 59 IN | SYSTOLIC BLOOD PRESSURE: 130 MMHG | HEART RATE: 76 BPM | DIASTOLIC BLOOD PRESSURE: 64 MMHG | BODY MASS INDEX: 39.23 KG/M2

## 2024-05-03 DIAGNOSIS — I10 PRIMARY HYPERTENSION: ICD-10-CM

## 2024-05-03 DIAGNOSIS — R80.9 MICROALBUMINURIA: ICD-10-CM

## 2024-05-03 DIAGNOSIS — R60.0 LOCALIZED EDEMA: ICD-10-CM

## 2024-05-03 DIAGNOSIS — Z79.4 TYPE 2 DIABETES MELLITUS WITH COMPLICATION, WITH LONG-TERM CURRENT USE OF INSULIN (HCC): ICD-10-CM

## 2024-05-03 DIAGNOSIS — I15.9 SECONDARY HYPERTENSION: ICD-10-CM

## 2024-05-03 DIAGNOSIS — E78.2 MIXED HYPERLIPIDEMIA: Primary | Chronic | ICD-10-CM

## 2024-05-03 DIAGNOSIS — Z95.1 S/P CABG (CORONARY ARTERY BYPASS GRAFT): ICD-10-CM

## 2024-05-03 DIAGNOSIS — E11.8 TYPE 2 DIABETES MELLITUS WITH COMPLICATION, WITH LONG-TERM CURRENT USE OF INSULIN (HCC): ICD-10-CM

## 2024-05-03 DIAGNOSIS — N18.32 STAGE 3B CHRONIC KIDNEY DISEASE (HCC): ICD-10-CM

## 2024-05-03 PROCEDURE — 99214 OFFICE O/P EST MOD 30 MIN: CPT | Performed by: NURSE PRACTITIONER

## 2024-05-03 RX ORDER — TORSEMIDE 20 MG/1
10 TABLET ORAL AS NEEDED
Qty: 15 TABLET | Refills: 1 | Status: SHIPPED | OUTPATIENT
Start: 2024-05-03 | End: 2024-05-03

## 2024-05-03 RX ORDER — TORSEMIDE 20 MG/1
10 TABLET ORAL AS NEEDED
Qty: 15 TABLET | Refills: 1 | Status: SHIPPED | OUTPATIENT
Start: 2024-05-03

## 2024-05-03 NOTE — PATIENT INSTRUCTIONS
You are seen today for continued monitoring of kidney function:  At this time kidney function is stable, at baseline    Recommendations:  For leg and foot swelling try taking a half a tablet of Demadex/torsemide as needed.  Only take the medication as needed.  Do not take more often than 1 or 2 times a week  Follow-up blood work in approximately 2 weeks to check kidney function and electrolytes  Avoid NSAIDs such as Motrin Aleve ibuprofen and Advil.  You can take Tylenol for control of pain  Follow-up appointment in approximately 4 months  Blood work and urine studies prior to the appointment  Call with any questions or concerns  Call if blood pressure remains consistently greater than 140/80

## 2024-05-06 ENCOUNTER — TELEPHONE (OUTPATIENT)
Dept: BEHAVIORAL/MENTAL HEALTH CLINIC | Facility: CLINIC | Age: 79
End: 2024-05-06

## 2024-05-06 NOTE — TELEPHONE ENCOUNTER
Writer RACHELLE for call back to schedule with [facility]

## 2024-05-17 DIAGNOSIS — F41.9 ANXIETY: ICD-10-CM

## 2024-05-17 DIAGNOSIS — F41.0 PANIC ATTACKS: ICD-10-CM

## 2024-05-17 DIAGNOSIS — F41.1 GAD (GENERALIZED ANXIETY DISORDER): ICD-10-CM

## 2024-05-17 NOTE — TELEPHONE ENCOUNTER
Reason for call:   [x] Refill   [] Prior Auth  [] Other:     Office:   [x] PCP/Provider - [facility]   [] Specialty/Provider -     Medication: Xanax     Dose/Frequency: 0.5mg twice daily PRN     Quantity: 60    Pharmacy: [id number]     Does the patient have enough for 3 days?   [] Yes   [x] No - Send as HP to POD

## 2024-05-18 RX ORDER — ALPRAZOLAM 0.5 MG/1
0.5 TABLET ORAL 2 TIMES DAILY PRN
Qty: 60 TABLET | Refills: 0 | Status: SHIPPED | OUTPATIENT
Start: 2024-05-18

## 2024-05-30 DIAGNOSIS — R60.0 LOCALIZED EDEMA: ICD-10-CM

## 2024-05-30 DIAGNOSIS — I10 PRIMARY HYPERTENSION: ICD-10-CM

## 2024-05-31 RX ORDER — TORSEMIDE 20 MG/1
10 TABLET ORAL AS NEEDED
Qty: 45 TABLET | Refills: 1 | Status: SHIPPED | OUTPATIENT
Start: 2024-05-31

## 2024-06-17 DIAGNOSIS — F41.9 ANXIETY: ICD-10-CM

## 2024-06-17 DIAGNOSIS — F41.1 GAD (GENERALIZED ANXIETY DISORDER): ICD-10-CM

## 2024-06-17 DIAGNOSIS — F41.0 PANIC ATTACKS: ICD-10-CM

## 2024-06-17 RX ORDER — ALPRAZOLAM 0.5 MG/1
0.5 TABLET ORAL 2 TIMES DAILY PRN
Qty: 60 TABLET | Refills: 0 | Status: SHIPPED | OUTPATIENT
Start: 2024-06-17

## 2024-06-17 NOTE — TELEPHONE ENCOUNTER
Reason for call:   [x] Refill   [] Prior Auth  [] Other:     Office:   [x] PCP/Provider - [facility]  [] Specialty/Provider -     Medication: ALPRAZolam (XANAX) 0.5 mg tablet      Dose/Frequency:  Take 1 tablet (0.5 mg total) by mouth 2 (two) times a day as needed for anxiety     Quantity: 60    Pharmacy: [facility]/pharmacy #[id number]  [place]ER[place] - [address] [phone]     Does the patient have enough for 3 days?   [] Yes   [x] No - Send as HP to POD

## 2024-07-23 DIAGNOSIS — F41.9 ANXIETY: ICD-10-CM

## 2024-07-23 DIAGNOSIS — F41.1 GAD (GENERALIZED ANXIETY DISORDER): ICD-10-CM

## 2024-07-23 DIAGNOSIS — F41.0 PANIC ATTACKS: ICD-10-CM

## 2024-07-23 RX ORDER — ALPRAZOLAM 0.5 MG/1
0.5 TABLET ORAL 2 TIMES DAILY PRN
Qty: 60 TABLET | Refills: 0 | Status: SHIPPED | OUTPATIENT
Start: 2024-07-23

## 2024-07-23 NOTE — TELEPHONE ENCOUNTER
Patient is completely out of this medication. She ran out yesterday.    Reason for call:   [x] Refill   [] Prior Auth  [] Other:     Office:   [x] PCP/Provider - [facility] / Sukumar Clemens, DO   [] Specialty/Provider -     Medication:       Does the patient have enough for 3 days?   [] Yes   [x] No - Send as HP to POD

## 2024-08-19 DIAGNOSIS — F41.1 GAD (GENERALIZED ANXIETY DISORDER): ICD-10-CM

## 2024-08-19 DIAGNOSIS — F41.9 ANXIETY: ICD-10-CM

## 2024-08-19 DIAGNOSIS — E78.2 MIXED HYPERLIPIDEMIA: Primary | Chronic | ICD-10-CM

## 2024-08-19 DIAGNOSIS — F41.0 PANIC ATTACKS: ICD-10-CM

## 2024-08-19 DIAGNOSIS — I15.9 SECONDARY HYPERTENSION: ICD-10-CM

## 2024-08-19 RX ORDER — ROSUVASTATIN CALCIUM 20 MG/1
20 TABLET, COATED ORAL EVERY EVENING
Qty: 90 TABLET | Refills: 1 | Status: SHIPPED | OUTPATIENT
Start: 2024-08-19

## 2024-08-19 RX ORDER — DILTIAZEM HYDROCHLORIDE 300 MG/1
300 CAPSULE, EXTENDED RELEASE ORAL DAILY
Qty: 90 CAPSULE | Refills: 0 | Status: SHIPPED | OUTPATIENT
Start: 2024-08-19

## 2024-08-19 RX ORDER — ALPRAZOLAM 0.5 MG
0.5 TABLET ORAL 2 TIMES DAILY PRN
Qty: 60 TABLET | Refills: 0 | Status: SHIPPED | OUTPATIENT
Start: 2024-08-19

## 2024-08-19 NOTE — TELEPHONE ENCOUNTER
Reason for call:   [x] Refill   [] Prior Auth  [] Other:     Office:   [x] PCP/Provider - Sukumar Clemens DO   [] Specialty/Provider -     Medication: ALPRAZolam (XANAX) 0.5 mg tablet / Take 1 tablet (0.5 mg total) by mouth 2 (two) times a day as needed for anxiety    Does the patient have enough for 3 days?   [x] Yes   [] No - Send as HP to POD

## 2024-08-19 NOTE — TELEPHONE ENCOUNTER
Patient called Rx Refill Line wondering if PCP can take over Cardiology medication refills until she is able to establish care with a new provider. She mentioned having a recent encounter with cardiologist prior to his [facility], as he was through [facility]. She expressed calling the number she was given to proceed with a cardiologist through [facility].     The medications needed are: diltiazem (TIAZAC) 300 MG 24 hr capsule, Sig:  Take 300 mg by mouth daily Tiddylt and rosuvastatin (CRESTOR) 20 MG tablet, Sig: Take 20 mg by mouth every evening.   If appropriate please send to [facility]/pharmacy #[id number] - [place], [place] - [address].

## 2024-08-22 DIAGNOSIS — L21.9 SEBORRHEIC DERMATITIS: ICD-10-CM

## 2024-08-22 RX ORDER — KETOCONAZOLE 20 MG/ML
SHAMPOO TOPICAL
Qty: 120 ML | Refills: 0 | Status: SHIPPED | OUTPATIENT
Start: 2024-08-22

## 2024-08-30 ENCOUNTER — TELEPHONE (OUTPATIENT)
Dept: NEPHROLOGY | Facility: CLINIC | Age: 79
End: 2024-08-30

## 2024-09-05 ENCOUNTER — OFFICE VISIT (OUTPATIENT)
Dept: NEPHROLOGY | Facility: CLINIC | Age: 79
End: 2024-09-05
Payer: COMMERCIAL

## 2024-09-05 VITALS
HEART RATE: 68 BPM | WEIGHT: 198 LBS | HEIGHT: 59 IN | BODY MASS INDEX: 39.92 KG/M2 | DIASTOLIC BLOOD PRESSURE: 72 MMHG | SYSTOLIC BLOOD PRESSURE: 120 MMHG

## 2024-09-05 DIAGNOSIS — R80.9 MICROALBUMINURIA: Primary | ICD-10-CM

## 2024-09-05 DIAGNOSIS — N18.9 CHRONIC RENAL IMPAIRMENT, UNSPECIFIED CKD STAGE: ICD-10-CM

## 2024-09-05 PROBLEM — N18.32 STAGE 3B CHRONIC KIDNEY DISEASE (HCC): Status: RESOLVED | Noted: 2023-01-22 | Resolved: 2024-09-05

## 2024-09-05 PROCEDURE — 99214 OFFICE O/P EST MOD 30 MIN: CPT | Performed by: INTERNAL MEDICINE

## 2024-09-05 NOTE — PATIENT INSTRUCTIONS
You are here for follow-up you explained to me that you are unable to get your labs prior to the visit because you are very busy with issues on the outside but you have the lab slips and you will get them done when it is convenient for you.  The creatinines the blood test for the kidney function the last time you had it checked it was stable at around 1.7 and that tends to be a range.  The last protein evaluation in the urine showed a very low level of protein which is a good thing and it was so low it tells me that you do not have diabetic kidney disease at this point.  You are aware your blood sugars run high and you are trying to work on that but that would be helpful to help reduce the risk of getting diabetes in the kidney in the future.    Otherwise your blood pressure is good continue current medications you likely have something called nephrosclerosis or aging of the kidney and this is a good prognosis but we will continue to monitor with the above measures and hopefully things will not change and if they did it would be manifested by heavier protein in the urine but so far so good.

## 2024-09-05 NOTE — PROGRESS NOTES
NEPHROLOGY PROGRESS NOTE    Yajaira Marsh 78 y.o. female MRN: [id number]  Unit/Bed#:  Encounter: [id number]  Reason for Consult: Chronic renal insufficiency    The patient is here for follow-up she was last seen by an TRACE in the office.  Since then she has been doing well she has not had any intercurrent illnesses or hospitalizations that she can recall.  She has had no changes in her medications.    ASSESSMENT/PLAN:  1.  Renal    The patient is chronic renal insufficiency likely due to nephrosclerosis.  Her father actually a kidney failure was on dialysis but it does not appear that she has polycystic kidney disease.  She is a longstanding diabetic and has poor glycemic control which she is aware of but at this point diabetes is not affecting her kidneys as she has very low levels of microalbumin.  So she does not have overt diabetic nephropathy so it is not the cause of her declining kidney function.  Her creatinines been ranging 1.5-1.7 is stable in that range with no progression.  Electrolytes are normal.    Occasionally she takes a diuretic so that can cause fluctuations in the creatinine due to changes in volume status but overall again things are stable without progression.    I told her to work on her glycemic control which she is well aware of trying to target A1c of 7% to reduce risk of diabetic complications.    Blood pressure is under good control.    Labs and follow-up as scheduled and she was told to call if there is any problems or concerns before next visit.    I have spent a total time of 30 minutes in caring for this patient on the day of the visit/encounter including Diagnostic results, Prognosis, Impressions, Reviewing / ordering tests, medicine, procedures  , and Obtaining or reviewing history  .         SUBJECTIVE:  Review of Systems   Constitutional: Negative for chills, diaphoresis and fever.   HENT: Negative.     Eyes: Negative.    Cardiovascular:  Negative for chest pain, dyspnea on  "exertion, leg swelling and orthopnea.   Respiratory: Negative.  Negative for cough, shortness of breath and sputum production.    Gastrointestinal:  Negative for abdominal pain, diarrhea, nausea and vomiting.   Genitourinary: Negative.    Neurological:  Negative for dizziness and headaches.   Psychiatric/Behavioral:  Negative for altered mental status.        OBJECTIVE:  Current Weight: Weight - Scale: 89.8 kg (198 lb)  Vitals:@TMAX(24)@Current:     Blood pressure 120/72, pulse 68, height 4' 11\" (1.499 m), weight 89.8 kg (198 lb). , Body mass index is 39.99 kg/m².    [unfilled]    Physical Exam: /72 (BP Location: Left arm, Patient Position: Sitting, Cuff Size: Standard)   Pulse 68   Ht 4' 11\" (1.499 m)   Wt 89.8 kg (198 lb)   BMI 39.99 kg/m²   Physical Exam  Constitutional:       General: She is not in acute distress.     Appearance: She is not toxic-appearing.   HENT:      Head: Normocephalic and atraumatic.      Nose: Nose normal.      Mouth/Throat:      Mouth: Mucous membranes are moist.   Eyes:      General: No scleral icterus.     Extraocular Movements: Extraocular movements intact.   Cardiovascular:      Rate and Rhythm: Normal rate and regular rhythm.      Heart sounds:      No gallop.   Pulmonary:      Effort: Pulmonary effort is normal. No respiratory distress.      Breath sounds: No wheezing or rales.   Abdominal:      General: Bowel sounds are normal. There is no distension.      Palpations: Abdomen is soft.      Tenderness: There is no abdominal tenderness.   Musculoskeletal:      Cervical back: Normal range of motion and neck supple.   Neurological:      Mental Status: She is alert and oriented to person, place, and time. Mental status is at baseline.   Psychiatric:         Mood and Affect: Mood normal.         Behavior: Behavior normal.         Medications:    Current Outpatient Medications:     ALPRAZolam (XANAX) 0.5 mg tablet, Take 1 tablet (0.5 mg total) by mouth 2 (two) times a day as " "needed for anxiety, Disp: 60 tablet, Rfl: 0    B-D UF III MINI PEN NEEDLES 31G X 5 MM MISC, , Disp: , Rfl:     BAQSIMI TWO PACK 3 MG/DOSE POWD, Use as directed , Disp: , Rfl: 0    BD Insulin Syringe U/F 31G X 5/16\" 0.5 ML MISC, 4 (four) times a day As directed, Disp: , Rfl:     cholecalciferol (VITAMIN D3) 1,000 units tablet, Take 2,000 Units by mouth daily, Disp: , Rfl:     Coenzyme Q10 (Co Q-10) 200 MG CAPS, Take by mouth in the morning, Disp: , Rfl:     Continuous Blood Gluc Sensor (Dexcom G6 Sensor) MISC, Use 1 each daily at bedtime as needed (scalp itching), Disp: 1 each, Rfl: 0    Continuous Blood Gluc Sensor (Dexcom G7 Sensor), Use 1 Device, Disp: , Rfl:     diclofenac sodium (VOLTAREN) 1 %, Apply 2 g topically 4 (four) times a day (Patient taking differently: Apply 2 g topically if needed), Disp: 1 Tube, Rfl: 0    diltiazem (TIAZAC) 300 MG 24 hr capsule, Take 1 capsule (300 mg total) by mouth daily Tiddylt, Disp: 90 capsule, Rfl: 0    Docusate Sodium (DSS) 100 MG CAPS, Take 1 capsule by mouth every 12 (twelve) hours, Disp: , Rfl:     insulin aspart (NovoLOG FlexPen) 100 UNIT/ML injection pen, PLEASE SEE ATTACHED FOR DETAILED DIRECTIONS, Disp: , Rfl:     insulin aspart (NovoLOG FlexPen) 100 UNIT/ML injection pen, Inject 18 Units under the skin 2 (two) times a day with meals, Disp: , Rfl:     irbesartan (AVAPRO) 300 mg tablet, Take 300 mg by mouth, Disp: , Rfl:     ketoconazole (NIZORAL) 2 % shampoo, APPLY TOPICALLY 1 APPLICATION ONCE FOR 1 DOSE, Disp: 120 mL, Rfl: 0    levothyroxine 75 mcg tablet, Take 75 mcg by mouth daily, Disp: , Rfl:     nitroglycerin (NITROLINGUAL) 0.4 mg/spray spray, , Disp: , Rfl: 1    rosuvastatin (CRESTOR) 20 MG tablet, Take 1 tablet (20 mg total) by mouth every evening, Disp: 90 tablet, Rfl: 1    torsemide (DEMADEX) 20 mg tablet, TAKE 0.5 TABLETS (10 MG TOTAL) BY MOUTH AS NEEDED (TAKE UP TO 2 TIMES PER WEEK FOR LEG SWELLING AS NEEDED), Disp: 45 tablet, Rfl: 1    Toujeo SoloStar 300 " "units/mL CONCENTRATED U-300 injection pen (1-unit dial), Inject 62 Units under the skin in the morning, Disp: , Rfl:     venlafaxine (EFFEXOR-XR) 75 mg 24 hr capsule, Take 1 capsule (75 mg total) by mouth daily (Patient taking differently: Take 37.5 mg by mouth daily), Disp: 90 capsule, Rfl: 1    aspirin 81 MG tablet, Take 81 mg by mouth daily (Patient not taking: Reported on 9/5/2024), Disp: , Rfl:     isosorbide mononitrate (IMDUR) 60 mg 24 hr tablet, TAKE 1.5 TABs AT NIGHT (Patient not taking: Reported on 9/5/2024), Disp: , Rfl:     Laboratory Results:  Lab Results   Component Value Date    WBC 10.22 (H) 11/14/2023    HGB 12.9 11/14/2023    HCT 39.5 11/14/2023    MCV 86 11/14/2023     11/14/2023     Lab Results   Component Value Date    SODIUM 135 04/22/2024    K 5.0 04/22/2024     04/22/2024    CO2 25 04/22/2024    BUN 33 (H) 04/22/2024    CREATININE 1.77 (H) 04/22/2024    GLUC 229 (H) 04/22/2024    CALCIUM 9.7 04/22/2024     Lab Results   Component Value Date    CALCIUM 9.7 04/22/2024    PHOS 3.4 11/11/2020     No results found for: \"LABPROT\"      "

## 2024-09-05 NOTE — LETTER
September 5, 2024     Sukumar Clemens DO  [address] [place] [id number]    Patient: Yajaira Marsh   YOB: 1945   Date of Visit: 9/5/2024       Dear Dr. Clemens:    Thank you for referring Yajaira Marsh to me for evaluation. Below are my notes for this consultation.    If you have questions, please do not hesitate to call me. I look forward to following your patient along with you.         Sincerely,        Basilio Arreola MD        CC: No Recipients    Basilio Arreola MD  9/5/2024  3:11 PM  Sign when Signing Visit  NEPHROLOGY PROGRESS NOTE    Yajaira Marsh 78 y.o. female MRN: [id number]  Unit/Bed#:  Encounter: [id number]  Reason for Consult: Chronic renal insufficiency    The patient is here for follow-up she was last seen by an TRACE in the office.  Since then she has been doing well she has not had any intercurrent illnesses or hospitalizations that she can recall.  She has had no changes in her medications.    ASSESSMENT/PLAN:  1.  Renal    The patient is chronic renal insufficiency likely due to nephrosclerosis.  Her father actually a kidney failure was on dialysis but it does not appear that she has polycystic kidney disease.  She is a longstanding diabetic and has poor glycemic control which she is aware of but at this point diabetes is not affecting her kidneys as she has very low levels of microalbumin.  So she does not have overt diabetic nephropathy so it is not the cause of her declining kidney function.  Her creatinines been ranging 1.5-1.7 is stable in that range with no progression.  Electrolytes are normal.    Occasionally she takes a diuretic so that can cause fluctuations in the creatinine due to changes in volume status but overall again things are stable without progression.    I told her to work on her glycemic control which she is well aware of trying to target A1c of 7% to reduce risk of diabetic complications.    Blood pressure is under good control.    Labs and  "follow-up as scheduled and she was told to call if there is any problems or concerns before next visit.    I have spent a total time of 30 minutes in caring for this patient on the day of the visit/encounter including Diagnostic results, Prognosis, Impressions, Reviewing / ordering tests, medicine, procedures  , and Obtaining or reviewing history  .         SUBJECTIVE:  Review of Systems   Constitutional: Negative for chills, diaphoresis and fever.   HENT: Negative.     Eyes: Negative.    Cardiovascular:  Negative for chest pain, dyspnea on exertion, leg swelling and orthopnea.   Respiratory: Negative.  Negative for cough, shortness of breath and sputum production.    Gastrointestinal:  Negative for abdominal pain, diarrhea, nausea and vomiting.   Genitourinary: Negative.    Neurological:  Negative for dizziness and headaches.   Psychiatric/Behavioral:  Negative for altered mental status.        OBJECTIVE:  Current Weight: Weight - Scale: 89.8 kg (198 lb)  Vitals:@TMAX(24)@Current:     Blood pressure 120/72, pulse 68, height 4' 11\" (1.499 m), weight 89.8 kg (198 lb). , Body mass index is 39.99 kg/m².    [unfilled]    Physical Exam: /72 (BP Location: Left arm, Patient Position: Sitting, Cuff Size: Standard)   Pulse 68   Ht 4' 11\" (1.499 m)   Wt 89.8 kg (198 lb)   BMI 39.99 kg/m²   Physical Exam  Constitutional:       General: She is not in acute distress.     Appearance: She is not toxic-appearing.   HENT:      Head: Normocephalic and atraumatic.      Nose: Nose normal.      Mouth/Throat:      Mouth: Mucous membranes are moist.   Eyes:      General: No scleral icterus.     Extraocular Movements: Extraocular movements intact.   Cardiovascular:      Rate and Rhythm: Normal rate and regular rhythm.      Heart sounds:      No gallop.   Pulmonary:      Effort: Pulmonary effort is normal. No respiratory distress.      Breath sounds: No wheezing or rales.   Abdominal:      General: Bowel sounds are normal. There is " "no distension.      Palpations: Abdomen is soft.      Tenderness: There is no abdominal tenderness.   Musculoskeletal:      Cervical back: Normal range of motion and neck supple.   Neurological:      Mental Status: She is alert and oriented to person, place, and time. Mental status is at baseline.   Psychiatric:         Mood and Affect: Mood normal.         Behavior: Behavior normal.         Medications:    Current Outpatient Medications:   •  ALPRAZolam (XANAX) 0.5 mg tablet, Take 1 tablet (0.5 mg total) by mouth 2 (two) times a day as needed for anxiety, Disp: 60 tablet, Rfl: 0  •  B-D UF III MINI PEN NEEDLES 31G X 5 MM MISC, , Disp: , Rfl:   •  BAQSIMI TWO PACK 3 MG/DOSE POWD, Use as directed , Disp: , Rfl: 0  •  BD Insulin Syringe U/F 31G X 5/16\" 0.5 ML MISC, 4 (four) times a day As directed, Disp: , Rfl:   •  cholecalciferol (VITAMIN D3) 1,000 units tablet, Take 2,000 Units by mouth daily, Disp: , Rfl:   •  Coenzyme Q10 (Co Q-10) 200 MG CAPS, Take by mouth in the morning, Disp: , Rfl:   •  Continuous Blood Gluc Sensor (Dexcom G6 Sensor) MISC, Use 1 each daily at bedtime as needed (scalp itching), Disp: 1 each, Rfl: 0  •  Continuous Blood Gluc Sensor (Dexcom G7 Sensor), Use 1 Device, Disp: , Rfl:   •  diclofenac sodium (VOLTAREN) 1 %, Apply 2 g topically 4 (four) times a day (Patient taking differently: Apply 2 g topically if needed), Disp: 1 Tube, Rfl: 0  •  diltiazem (TIAZAC) 300 MG 24 hr capsule, Take 1 capsule (300 mg total) by mouth daily Tiddylt, Disp: 90 capsule, Rfl: 0  •  Docusate Sodium (DSS) 100 MG CAPS, Take 1 capsule by mouth every 12 (twelve) hours, Disp: , Rfl:   •  insulin aspart (NovoLOG FlexPen) 100 UNIT/ML injection pen, PLEASE SEE ATTACHED FOR DETAILED DIRECTIONS, Disp: , Rfl:   •  insulin aspart (NovoLOG FlexPen) 100 UNIT/ML injection pen, Inject 18 Units under the skin 2 (two) times a day with meals, Disp: , Rfl:   •  irbesartan (AVAPRO) 300 mg tablet, Take 300 mg by mouth, Disp: , Rfl:   • " " ketoconazole (NIZORAL) 2 % shampoo, APPLY TOPICALLY 1 APPLICATION ONCE FOR 1 DOSE, Disp: 120 mL, Rfl: 0  •  levothyroxine 75 mcg tablet, Take 75 mcg by mouth daily, Disp: , Rfl:   •  nitroglycerin (NITROLINGUAL) 0.4 mg/spray spray, , Disp: , Rfl: 1  •  rosuvastatin (CRESTOR) 20 MG tablet, Take 1 tablet (20 mg total) by mouth every evening, Disp: 90 tablet, Rfl: 1  •  torsemide (DEMADEX) 20 mg tablet, TAKE 0.5 TABLETS (10 MG TOTAL) BY MOUTH AS NEEDED (TAKE UP TO 2 TIMES PER WEEK FOR LEG SWELLING AS NEEDED), Disp: 45 tablet, Rfl: 1  •  Toujeo SoloStar 300 units/mL CONCENTRATED U-300 injection pen (1-unit dial), Inject 62 Units under the skin in the morning, Disp: , Rfl:   •  venlafaxine (EFFEXOR-XR) 75 mg 24 hr capsule, Take 1 capsule (75 mg total) by mouth daily (Patient taking differently: Take 37.5 mg by mouth daily), Disp: 90 capsule, Rfl: 1  •  aspirin 81 MG tablet, Take 81 mg by mouth daily (Patient not taking: Reported on 9/5/2024), Disp: , Rfl:   •  isosorbide mononitrate (IMDUR) 60 mg 24 hr tablet, TAKE 1.5 TABs AT NIGHT (Patient not taking: Reported on 9/5/2024), Disp: , Rfl:     Laboratory Results:  Lab Results   Component Value Date    WBC 10.22 (H) 11/14/2023    HGB 12.9 11/14/2023    HCT 39.5 11/14/2023    MCV 86 11/14/2023     11/14/2023     Lab Results   Component Value Date    SODIUM 135 04/22/2024    K 5.0 04/22/2024     04/22/2024    CO2 25 04/22/2024    BUN 33 (H) 04/22/2024    CREATININE 1.77 (H) 04/22/2024    GLUC 229 (H) 04/22/2024    CALCIUM 9.7 04/22/2024     Lab Results   Component Value Date    CALCIUM 9.7 04/22/2024    PHOS 3.4 11/11/2020     No results found for: \"LABPROT\"      "

## 2024-09-30 ENCOUNTER — OFFICE VISIT (OUTPATIENT)
Dept: INTERNAL MEDICINE CLINIC | Facility: CLINIC | Age: 79
End: 2024-09-30
Payer: COMMERCIAL

## 2024-09-30 VITALS
OXYGEN SATURATION: 97 % | WEIGHT: 195 LBS | DIASTOLIC BLOOD PRESSURE: 80 MMHG | SYSTOLIC BLOOD PRESSURE: 138 MMHG | RESPIRATION RATE: 20 BRPM | HEART RATE: 65 BPM | BODY MASS INDEX: 39.31 KG/M2 | HEIGHT: 59 IN

## 2024-09-30 DIAGNOSIS — F41.0 PANIC ATTACKS: ICD-10-CM

## 2024-09-30 DIAGNOSIS — F41.1 GAD (GENERALIZED ANXIETY DISORDER): ICD-10-CM

## 2024-09-30 DIAGNOSIS — F41.9 ANXIETY: ICD-10-CM

## 2024-09-30 DIAGNOSIS — H10.9 CONJUNCTIVITIS: Primary | ICD-10-CM

## 2024-09-30 PROCEDURE — G2211 COMPLEX E/M VISIT ADD ON: HCPCS

## 2024-09-30 PROCEDURE — 99213 OFFICE O/P EST LOW 20 MIN: CPT

## 2024-09-30 RX ORDER — ERYTHROMYCIN 5 MG/G
0.5 OINTMENT OPHTHALMIC
Qty: 3.5 G | Refills: 0 | Status: SHIPPED | OUTPATIENT
Start: 2024-09-30

## 2024-09-30 RX ORDER — ALPRAZOLAM 0.5 MG
0.5 TABLET ORAL 2 TIMES DAILY PRN
Qty: 60 TABLET | Refills: 0 | Status: SHIPPED | OUTPATIENT
Start: 2024-09-30

## 2024-09-30 NOTE — PROGRESS NOTES
Ambulatory Visit  Name: Yajaira Marsh      : 1945      MRN: [id number]  Encounter Provider: Essie Woo MD  Encounter Date: 2024   Encounter department: [facility]    Assessment & Plan  Conjunctivitis  Patient presented to the clinic with complaints of burning sensation in the eye for the past 3 days  It is presented bilaterally however more on the right side than left  Patient also complains of diarrheal episodes for the past 3 days, secondary to her anxiety  No signs of pus or discharge on examination  Conjunctiva is red  Conjunctivitis most likely secondary to viral    PLAN:  Erythromycin eyedrops    Orders:    erythromycin (ILOTYCIN) ophthalmic ointment; Administer 0.5 inches to both eyes daily at bedtime       History of Present Illness     HPI 78-year-old female comes to the clinic with complaints of burning sensation in the eye for the past 3 days.  It is present bilaterally however more on the right side than the left.  Patient also complains of diarrheal episodes for the past 3 days, most likely secondary to her anxiety.  No signs of fevers, pus or purulent discharge on examination.  Patient denies any other symptoms.    History obtained from : patient  Review of Systems   Eyes:  Positive for photophobia, discharge and redness.   Respiratory: Negative.     Cardiovascular: Negative.    Gastrointestinal:  Positive for diarrhea.   Endocrine: Negative.    Genitourinary: Negative.    Musculoskeletal: Negative.    Neurological: Negative.    Hematological: Negative.    Psychiatric/Behavioral: Negative.       Pertinent Medical History       Medical History Reviewed by provider this encounter:       Past Medical History   Past Medical History:   Diagnosis Date    Acute embolism and thrombosis of unspecified deep veins of unspecified lower extremity (HCC)     Last Assessed:  17    Anemia     Anosmia     Anxiety     Arthritis     Asthma     Back pain     Bilateral  macular retinal edema     CAD (coronary artery disease)     Cataract     Cervical disc herniation     Cervical radiculopathy     Cervical spinal stenosis     Cervical spondylolysis     Chronic kidney disease     Chronic mastoiditis     Colon polyp     Complex endometrial hyperplasia     Depression     Diabetes mellitus (HCC)     Disease of thyroid gland     DVT (deep venous thrombosis) (HCC)     DVT (deep venous thrombosis) ([facility]) 06/16/2017    Fibromyalgia     Fibromyalgia, primary     Hyperlipidemia     Hypertension     Hypothyroid     Kidney stone     Lumbar radiculopathy     Neck pain     Obese     PONV (postoperative nausea and vomiting)     Shingles 07/01/2021    Spinal stenosis     Stomach ulcer     Thyroid disease     Vascular claudication ([facility]) 12/11/2017     Past Surgical History:   Procedure Laterality Date    BACK SURGERY      CARPAL TUNNEL RELEASE      CATARACT EXTRACTION      CHOLECYSTECTOMY      COLONOSCOPY      CORONARY ANGIOPLASTY      CORONARY ARTERY BYPASS GRAFT      CYSTOSCOPY N/A 6/20/2017    Procedure: CYSTOSCOPY;  Surgeon: Tacho Arnold MD;  Location: [facility];  Service: Gynecology Oncology    CYSTOSCOPY      WY LAPS TOTAL HYSTERECT 250 GM/< W/RMVL TUBE/OVARY N/A 6/20/2017    Procedure: ROBOTIC HYSTERECTOMY; BILATERAL SALPINO-OOPHERECTOMY; umbilical hernia repair.;  Surgeon: Tacho Arnold MD;  Location: [facility];  Service: Gynecology Oncology    WY REPAIR FIRST ABDOMINAL WALL HERNIA N/A 5/12/2022    Procedure: REPAIR HERNIA INCISIONAL;  Surgeon: Horacio Scherer DO;  Location: [facility];  Service: General    TONSILECTOMY AND ADNOIDECTOMY      TONSILLECTOMY      UMBILICAL HERNIA REPAIR       Family History   Problem Relation Age of Onset    Arthritis Mother     Leukemia Mother     Other Mother         Anxiety, major depressive disorder, recurrent episode with atypical features    Coronary artery disease Father         Heart problem    Diabetes Father     Other Father          "Infectious disease    Alzheimer's disease Maternal Grandmother     Other Maternal Grandfather         Heart problem    Other Daughter         Anxiety, major depressive disorder, recurrent episode with atypical features    Alcohol abuse Other         Grandparent    Cancer Family     Diabetes Family     Hypertension Family     Other Family         Reported prior back trouble, thyroid disorder     Current Outpatient Medications on File Prior to Visit   Medication Sig Dispense Refill    ALPRAZolam (XANAX) 0.5 mg tablet Take 1 tablet (0.5 mg total) by mouth 2 (two) times a day as needed for anxiety 60 tablet 0    aspirin 81 MG tablet Take 81 mg by mouth daily      B-D UF III MINI PEN NEEDLES 31G X 5 MM MISC       BAQSIMI TWO PACK 3 MG/DOSE POWD Use as directed   0    BD Insulin Syringe U/F 31G X 5/16\" 0.5 ML MISC 4 (four) times a day As directed      cholecalciferol (VITAMIN D3) 1,000 units tablet Take 2,000 Units by mouth daily      Coenzyme Q10 (Co Q-10) 200 MG CAPS Take by mouth in the morning      Continuous Blood Gluc Sensor (Dexcom G6 Sensor) MISC Use 1 each daily at bedtime as needed (scalp itching) 1 each 0    Continuous Blood Gluc Sensor (Dexcom G7 Sensor) Use 1 Device      diclofenac sodium (VOLTAREN) 1 % Apply 2 g topically 4 (four) times a day (Patient taking differently: Apply 2 g topically if needed) 1 Tube 0    diltiazem (TIAZAC) 300 MG 24 hr capsule Take 1 capsule (300 mg total) by mouth daily Tiddylt 90 capsule 0    Docusate Sodium (DSS) 100 MG CAPS Take 1 capsule by mouth every 12 (twelve) hours      insulin aspart (NovoLOG FlexPen) 100 UNIT/ML injection pen PLEASE SEE ATTACHED FOR DETAILED DIRECTIONS      insulin aspart (NovoLOG FlexPen) 100 UNIT/ML injection pen Inject 18 Units under the skin 2 (two) times a day with meals      ketoconazole (NIZORAL) 2 % shampoo APPLY TOPICALLY 1 APPLICATION ONCE FOR 1 DOSE 120 mL 0    levothyroxine 75 mcg tablet Take 75 mcg by mouth daily      nitroglycerin " "(NITROLINGUAL) 0.4 mg/spray spray   1    rosuvastatin (CRESTOR) 20 MG tablet Take 1 tablet (20 mg total) by mouth every evening 90 tablet 1    torsemide (DEMADEX) 20 mg tablet TAKE 0.5 TABLETS (10 MG TOTAL) BY MOUTH AS NEEDED (TAKE UP TO 2 TIMES PER WEEK FOR LEG SWELLING AS NEEDED) 45 tablet 1    Toujeo SoloStar 300 units/mL CONCENTRATED U-300 injection pen (1-unit dial) Inject 62 Units under the skin in the morning      venlafaxine (EFFEXOR-XR) 75 mg 24 hr capsule Take 1 capsule (75 mg total) by mouth daily (Patient taking differently: Take 37.5 mg by mouth daily) 90 capsule 1    irbesartan (AVAPRO) 300 mg tablet Take 300 mg by mouth      isosorbide mononitrate (IMDUR) 60 mg 24 hr tablet TAKE 1.5 TABs AT NIGHT (Patient not taking: Reported on 9/5/2024)       No current facility-administered medications on file prior to visit.     Allergies   Allergen Reactions    Molds & Smuts Allergic Rhinitis    Other Allergic Rhinitis     RAGWEED, CAT DANDER, DOG DANDER     Pollen Extract Other (See Comments)     Cold symptoms        Current Outpatient Medications on File Prior to Visit   Medication Sig Dispense Refill    ALPRAZolam (XANAX) 0.5 mg tablet Take 1 tablet (0.5 mg total) by mouth 2 (two) times a day as needed for anxiety 60 tablet 0    aspirin 81 MG tablet Take 81 mg by mouth daily      B-D UF III MINI PEN NEEDLES 31G X 5 MM MISC       BAQSIMI TWO PACK 3 MG/DOSE POWD Use as directed   0    BD Insulin Syringe U/F 31G X 5/16\" 0.5 ML MISC 4 (four) times a day As directed      cholecalciferol (VITAMIN D3) 1,000 units tablet Take 2,000 Units by mouth daily      Coenzyme Q10 (Co Q-10) 200 MG CAPS Take by mouth in the morning      Continuous Blood Gluc Sensor (Dexcom G6 Sensor) MISC Use 1 each daily at bedtime as needed (scalp itching) 1 each 0    Continuous Blood Gluc Sensor (Dexcom G7 Sensor) Use 1 Device      diclofenac sodium (VOLTAREN) 1 % Apply 2 g topically 4 (four) times a day (Patient taking differently: Apply 2 g " topically if needed) 1 Tube 0    diltiazem (TIAZAC) 300 MG 24 hr capsule Take 1 capsule (300 mg total) by mouth daily Tiddylt 90 capsule 0    Docusate Sodium (DSS) 100 MG CAPS Take 1 capsule by mouth every 12 (twelve) hours      insulin aspart (NovoLOG FlexPen) 100 UNIT/ML injection pen PLEASE SEE ATTACHED FOR DETAILED DIRECTIONS      insulin aspart (NovoLOG FlexPen) 100 UNIT/ML injection pen Inject 18 Units under the skin 2 (two) times a day with meals      ketoconazole (NIZORAL) 2 % shampoo APPLY TOPICALLY 1 APPLICATION ONCE FOR 1 DOSE 120 mL 0    levothyroxine 75 mcg tablet Take 75 mcg by mouth daily      nitroglycerin (NITROLINGUAL) 0.4 mg/spray spray   1    rosuvastatin (CRESTOR) 20 MG tablet Take 1 tablet (20 mg total) by mouth every evening 90 tablet 1    torsemide (DEMADEX) 20 mg tablet TAKE 0.5 TABLETS (10 MG TOTAL) BY MOUTH AS NEEDED (TAKE UP TO 2 TIMES PER WEEK FOR LEG SWELLING AS NEEDED) 45 tablet 1    Toujeo SoloStar 300 units/mL CONCENTRATED U-300 injection pen (1-unit dial) Inject 62 Units under the skin in the morning      venlafaxine (EFFEXOR-XR) 75 mg 24 hr capsule Take 1 capsule (75 mg total) by mouth daily (Patient taking differently: Take 37.5 mg by mouth daily) 90 capsule 1    irbesartan (AVAPRO) 300 mg tablet Take 300 mg by mouth      isosorbide mononitrate (IMDUR) 60 mg 24 hr tablet TAKE 1.5 TABs AT NIGHT (Patient not taking: Reported on 2024)       No current facility-administered medications on file prior to visit.      Social History     Tobacco Use    Smoking status: Former     Current packs/day: 0.00     Average packs/day: 1 pack/day for 6.0 years (6.0 ttl pk-yrs)     Types: Cigarettes     Start date:      Quit date:      Years since quittin.7    Smokeless tobacco: Never    Tobacco comments:     Smoked about a half a pack for about 4 yrs.  Quite about 50 yrs ago.   Vaping Use    Vaping status: Never Used   Substance and Sexual Activity    Alcohol use: No    Drug use: No  "   Sexual activity: Not Currently     Comment: No known STD risk factors         Objective     /80 (BP Location: Left arm, Patient Position: Sitting, Cuff Size: Standard)   Pulse 65   Resp 20   Ht 4' 11\" (1.499 m)   Wt 88.5 kg (195 lb)   SpO2 97%   BMI 39.39 kg/m²     Physical Exam  Constitutional:       Appearance: Normal appearance.   HENT:      Head: Normocephalic and atraumatic.      Right Ear: External ear normal.   Eyes:      General:         Right eye: Discharge present.         Left eye: Discharge present.     Comments: Redness of the conjunctiva    Cardiovascular:      Rate and Rhythm: Normal rate and regular rhythm.      Pulses: Normal pulses.      Heart sounds: Normal heart sounds.   Pulmonary:      Effort: Pulmonary effort is normal.      Breath sounds: Normal breath sounds.   Abdominal:      General: Bowel sounds are normal.      Palpations: Abdomen is soft.   Musculoskeletal:         General: Normal range of motion.   Skin:     General: Skin is warm.      Capillary Refill: Capillary refill takes less than 2 seconds.   Neurological:      General: No focal deficit present.      Mental Status: She is alert and oriented to person, place, and time.       Administrative Statements   I have spent a total time of 30 minutes in caring for this patient on the day of the visit/encounter including Obtaining or reviewing history  .  "

## 2024-09-30 NOTE — ASSESSMENT & PLAN NOTE
Patient presented to the clinic with complaints of burning sensation in the eye for the past 3 days  It is presented bilaterally however more on the right side than left  Patient also complains of diarrheal episodes for the past 3 days, secondary to her anxiety  No signs of pus or discharge on examination  Conjunctiva is red  Conjunctivitis most likely secondary to viral    PLAN:  Erythromycin eyedrops    Orders:    erythromycin (ILOTYCIN) ophthalmic ointment; Administer 0.5 inches to both eyes daily at bedtime

## 2024-10-01 ENCOUNTER — OFFICE VISIT (OUTPATIENT)
Dept: INTERNAL MEDICINE CLINIC | Facility: CLINIC | Age: 79
End: 2024-10-01
Payer: COMMERCIAL

## 2024-10-01 ENCOUNTER — NURSE TRIAGE (OUTPATIENT)
Age: 79
End: 2024-10-01

## 2024-10-01 VITALS
SYSTOLIC BLOOD PRESSURE: 142 MMHG | BODY MASS INDEX: 39.39 KG/M2 | WEIGHT: 195 LBS | HEART RATE: 99 BPM | OXYGEN SATURATION: 96 % | DIASTOLIC BLOOD PRESSURE: 62 MMHG

## 2024-10-01 DIAGNOSIS — H10.9 CONJUNCTIVITIS, UNSPECIFIED CONJUNCTIVITIS TYPE, UNSPECIFIED LATERALITY: Primary | ICD-10-CM

## 2024-10-01 PROCEDURE — G2211 COMPLEX E/M VISIT ADD ON: HCPCS | Performed by: INTERNAL MEDICINE

## 2024-10-01 PROCEDURE — 99213 OFFICE O/P EST LOW 20 MIN: CPT | Performed by: INTERNAL MEDICINE

## 2024-10-01 RX ORDER — TOBRAMYCIN AND DEXAMETHASONE 3; 1 MG/ML; MG/ML
1 SUSPENSION/ DROPS OPHTHALMIC
Qty: 5 ML | Refills: 0 | Status: SHIPPED | OUTPATIENT
Start: 2024-10-01

## 2024-10-01 NOTE — PROGRESS NOTES
Ambulatory Visit  Name: Yajaira Marsh      : 1945      MRN: [id number]  Encounter Provider: Jamin Worrell MD  Encounter Date: 10/1/2024   Encounter department: [facility]    Assessment & Plan  Conjunctivitis, unspecified conjunctivitis type, unspecified laterality  Will try alternative treatment with TobraDex, if patient having reaction to this eyedrop, then just try warm washcloth 3 times a day.  Patient can also do warm washcloth 3 times a day even with using the TobraDex, follow-up eye doctor if not improving.  No pain with eye movements    Orders:    tobramycin-dexamethasone (TOBRADEX) ophthalmic suspension; Administer 1 drop to the right eye every 4 (four) hours while awake         History of Present Illness     I am seeing patient in coverage today for an acute issue, patient reports having eye burning after using gel for conjunctivitis.  No painful eye movements, P has had a little crusting of right eye and a little irritation.       Review of Systems   Constitutional:  Negative for chills and fever.   HENT:  Positive for rhinorrhea. Negative for sore throat.    Eyes:  Positive for discharge and itching. Negative for redness and visual disturbance.     Past Medical History:   Diagnosis Date    Acute embolism and thrombosis of unspecified deep veins of unspecified lower extremity (HCC)     Last Assessed:  17    Anemia     Anosmia     Anxiety     Arthritis     Asthma     Back pain     Bilateral macular retinal edema     CAD (coronary artery disease)     Cataract     Cervical disc herniation     Cervical radiculopathy     Cervical spinal stenosis     Cervical spondylolysis     Chronic kidney disease     Chronic mastoiditis     Colon polyp     Complex endometrial hyperplasia     Depression     Diabetes mellitus (HCC)     Disease of thyroid gland     DVT (deep venous thrombosis) (HCC)     DVT (deep venous thrombosis) ([facility]) 2017    Fibromyalgia     Fibromyalgia,  primary     Hyperlipidemia     Hypertension     Hypothyroid     Kidney stone     Lumbar radiculopathy     Neck pain     Obese     PONV (postoperative nausea and vomiting)     Shingles 07/01/2021    Spinal stenosis     Stomach ulcer     Thyroid disease     Vascular claudication (HCC) 12/11/2017     Past Surgical History:   Procedure Laterality Date    BACK SURGERY      CARPAL TUNNEL RELEASE      CATARACT EXTRACTION      CHOLECYSTECTOMY      COLONOSCOPY      CORONARY ANGIOPLASTY      CORONARY ARTERY BYPASS GRAFT      CYSTOSCOPY N/A 6/20/2017    Procedure: CYSTOSCOPY;  Surgeon: Tacho Arnold MD;  Location: [facility];  Service: Gynecology Oncology    CYSTOSCOPY      AZ LAPS TOTAL HYSTERECT 250 GM/< W/RMVL TUBE/OVARY N/A 6/20/2017    Procedure: ROBOTIC HYSTERECTOMY; BILATERAL SALPINO-OOPHERECTOMY; umbilical hernia repair.;  Surgeon: Tacho Arnold MD;  Location: [facility];  Service: Gynecology Oncology    AZ REPAIR FIRST ABDOMINAL WALL HERNIA N/A 5/12/2022    Procedure: REPAIR HERNIA INCISIONAL;  Surgeon: Horacio Scherer DO;  Location: [facility];  Service: General    TONSILECTOMY AND ADNOIDECTOMY      TONSILLECTOMY      UMBILICAL HERNIA REPAIR       Family History   Problem Relation Age of Onset    Arthritis Mother     Leukemia Mother     Other Mother         Anxiety, major depressive disorder, recurrent episode with atypical features    Coronary artery disease Father         Heart problem    Diabetes Father     Other Father         Infectious disease    Alzheimer's disease Maternal Grandmother     Other Maternal Grandfather         Heart problem    Other Daughter         Anxiety, major depressive disorder, recurrent episode with atypical features    Alcohol abuse Other         Grandparent    Cancer Family     Diabetes Family     Hypertension Family     Other Family         Reported prior back trouble, thyroid disorder     Social History     Tobacco Use    Smoking status: Former     Current packs/day: 0.00     " Average packs/day: 1 pack/day for 6.0 years (6.0 ttl pk-yrs)     Types: Cigarettes     Start date:      Quit date:      Years since quittin.7    Smokeless tobacco: Never    Tobacco comments:     Smoked about a half a pack for about 4 yrs.  Quite about 50 yrs ago.   Vaping Use    Vaping status: Never Used   Substance and Sexual Activity    Alcohol use: No    Drug use: No    Sexual activity: Not Currently     Comment: No known STD risk factors     Current Outpatient Medications on File Prior to Visit   Medication Sig    ALPRAZolam (XANAX) 0.5 mg tablet Take 1 tablet (0.5 mg total) by mouth 2 (two) times a day as needed for anxiety    aspirin 81 MG tablet Take 81 mg by mouth daily    B-D UF III MINI PEN NEEDLES 31G X 5 MM MISC     BAQSIMI TWO PACK 3 MG/DOSE POWD Use as directed     BD Insulin Syringe U/F 31G X \" 0.5 ML MISC 4 (four) times a day As directed    cholecalciferol (VITAMIN D3) 1,000 units tablet Take 2,000 Units by mouth daily    Coenzyme Q10 (Co Q-10) 200 MG CAPS Take by mouth in the morning    Continuous Blood Gluc Sensor (Dexcom G6 Sensor) MISC Use 1 each daily at bedtime as needed (scalp itching)    Continuous Blood Gluc Sensor (Dexcom G7 Sensor) Use 1 Device    diclofenac sodium (VOLTAREN) 1 % Apply 2 g topically 4 (four) times a day (Patient taking differently: Apply 2 g topically if needed)    diltiazem (TIAZAC) 300 MG 24 hr capsule Take 1 capsule (300 mg total) by mouth daily Tiddylt    Docusate Sodium (DSS) 100 MG CAPS Take 1 capsule by mouth every 12 (twelve) hours    erythromycin (ILOTYCIN) ophthalmic ointment Administer 0.5 inches to both eyes daily at bedtime    insulin aspart (NovoLOG FlexPen) 100 UNIT/ML injection pen PLEASE SEE ATTACHED FOR DETAILED DIRECTIONS    insulin aspart (NovoLOG FlexPen) 100 UNIT/ML injection pen Inject 18 Units under the skin 2 (two) times a day with meals    ketoconazole (NIZORAL) 2 % shampoo APPLY TOPICALLY 1 APPLICATION ONCE FOR 1 DOSE    " levothyroxine 75 mcg tablet Take 75 mcg by mouth daily    nitroglycerin (NITROLINGUAL) 0.4 mg/spray spray     rosuvastatin (CRESTOR) 20 MG tablet Take 1 tablet (20 mg total) by mouth every evening    torsemide (DEMADEX) 20 mg tablet TAKE 0.5 TABLETS (10 MG TOTAL) BY MOUTH AS NEEDED (TAKE UP TO 2 TIMES PER WEEK FOR LEG SWELLING AS NEEDED)    Toujeo SoloStar 300 units/mL CONCENTRATED U-300 injection pen (1-unit dial) Inject 62 Units under the skin in the morning    venlafaxine (EFFEXOR-XR) 75 mg 24 hr capsule Take 1 capsule (75 mg total) by mouth daily (Patient taking differently: Take 37.5 mg by mouth daily)    irbesartan (AVAPRO) 300 mg tablet Take 300 mg by mouth    isosorbide mononitrate (IMDUR) 60 mg 24 hr tablet TAKE 1.5 TABs AT NIGHT (Patient not taking: Reported on 9/5/2024)     Allergies   Allergen Reactions    Molds & Smuts Allergic Rhinitis    Other Allergic Rhinitis     RAGWEED, CAT DANDER, DOG DANDER     Pollen Extract Other (See Comments)     Cold symptoms       Immunization History   Administered Date(s) Administered    COVID-19 MODERNA VACC 0.5 ML IM 02/03/2021, 03/03/2021, 12/21/2021    COVID-19 Moderna mRNA Vaccine 12 Yr+ 50 mcg/0.5 mL (Spikevax) 12/13/2023    INFLUENZA 11/04/2008, 11/01/2011    Influenza Split High Dose Preservative Free IM 10/07/2015, 12/01/2016, 10/02/2017    Influenza, high dose seasonal 0.7 mL 09/07/2018, 10/12/2019, 09/17/2020, 11/18/2021, 10/01/2022, 10/07/2023    Influenza, seasonal, injectable 10/20/2012, 10/05/2013, 09/16/2014    Pneumococcal Conjugate 13-Valent 12/17/2014    Pneumococcal Polysaccharide PPV23 01/18/2022    Tdap 08/24/2020     Objective     /62 (BP Location: Left arm, Patient Position: Sitting, Cuff Size: Large)   Pulse 99   Wt 88.5 kg (195 lb)   SpO2 96%   BMI 39.39 kg/m²     Physical Exam  Constitutional:       Appearance: Normal appearance.   Eyes:      General: No scleral icterus.        Right eye: No discharge.         Left eye: No discharge.       Extraocular Movements: Extraocular movements intact.      Conjunctiva/sclera: Conjunctivae normal.   Neurological:      Mental Status: She is alert.

## 2024-10-01 NOTE — ASSESSMENT & PLAN NOTE
Will try alternative treatment with TobraDex, if patient having reaction to this eyedrop, then just try warm washcloth 3 times a day.  Patient can also do warm washcloth 3 times a day even with using the TobraDex, follow-up eye doctor if not improving.  No pain with eye movements    Orders:    tobramycin-dexamethasone (TOBRADEX) ophthalmic suspension; Administer 1 drop to the right eye every 4 (four) hours while awake

## 2024-10-01 NOTE — PATIENT INSTRUCTIONS
Problem List Items Addressed This Visit          Eye    Conjunctivitis - Primary     Will try alternative treatment with TobraDex, if patient having reaction to this eyedrop, then just try warm washcloth 3 times a day.  Patient can also do warm washcloth 3 times a day even with using the TobraDex, follow-up eye doctor if not improving.  No pain with eye movements         Relevant Medications    tobramycin-dexamethasone (TOBRADEX) ophthalmic suspension

## 2024-10-01 NOTE — TELEPHONE ENCOUNTER
"Patient was seen in the office yesterday for left eye conjunctivitis.  Given erythromycin ointment.  Last night, the patient applied the first dose.  Immediately had burning.  Today, she calls in with continued burning, eye redness and swelling.  At one point she could not open her eye.  She states now she can open it, but it remains very painful.  Had patient irrigate eye while on the phone.  States some relief, but is still burning.  Vision is a little blurry.  Encouraged patient to keep up with eye irrigation and cool compresses.  Attempted to call the office, was hung up on while on hold.  Appointment scheduled for this afternoon.  Advised patient of UC/ED protocol if symptoms change or worsen.     Reason for Disposition   Patient wants to be seen    Answer Assessment - Initial Assessment Questions  1. ONSET: \"When did the pain start?\" (e.g., minutes, hours, days)      Started ointment last night erythromycin  2. TIMING: \"Does the pain come and go, or has it been constant since it started?\" (e.g., constant, intermittent, fleeting)      After new medication   3. SEVERITY: \"How bad is the pain?\"   (Scale 1-10; mild, moderate or severe)    - MILD (1-3): doesn't interfere with normal activities     - MODERATE (4-7): interferes with normal activities or awakens from sleep     - SEVERE (8-10): excruciating pain and patient unable to do normal activities      Severe   4. LOCATION: \"Where does it hurt?\"  (e.g., eyelid, eye, cheekbone)      Right eye  5. CAUSE: \"What do you think is causing the pain?\"      Conjunctivitis   6. VISION: \"Do you have blurred vision or changes in your vision?\"       Blurry vision  7. EYE DISCHARGE: \"Is there any discharge (pus) from the eye(s)?\"  If yes, ask: \"What color is it?\"       White   8. FEVER: \"Do you have a fever?\" If Yes, ask: \"What is it, how was it measured, and when did it start?\"       Denies  9. OTHER SYMPTOMS: \"Do you have any other symptoms?\" (e.g., headache, nasal " "discharge, facial rash)      Denies  10. PREGNANCY: \"Is there any chance you are pregnant?\" \"When was your last menstrual period?\"        N/A    Protocols used: Eye Pain-ADULT-[place]    "

## 2024-10-05 ENCOUNTER — IMMUNIZATIONS (OUTPATIENT)
Dept: INTERNAL MEDICINE CLINIC | Facility: CLINIC | Age: 79
End: 2024-10-05
Payer: COMMERCIAL

## 2024-10-05 DIAGNOSIS — Z23 ENCOUNTER FOR IMMUNIZATION: Primary | ICD-10-CM

## 2024-10-05 PROCEDURE — 90662 IIV NO PRSV INCREASED AG IM: CPT

## 2024-10-05 PROCEDURE — G0008 ADMIN INFLUENZA VIRUS VAC: HCPCS

## 2024-10-15 ENCOUNTER — NURSE TRIAGE (OUTPATIENT)
Age: 79
End: 2024-10-15

## 2024-10-15 NOTE — TELEPHONE ENCOUNTER
Regarding: diarrhea 4 weeks  ----- Message from Dana QUINTERO sent at 10/15/2024  1:12 PM EDT -----  Diarrhea for 4 weeks.  She stated she is eating but not as much.  She is diabetic.  Please triage   She stated her cardiologist retired and her heart doesn't feel right.

## 2024-10-15 NOTE — TELEPHONE ENCOUNTER
"Please further advise. Patient will follow home advice until hearing back form office regarding any further recommendations or an appt . c/o episodes of loose stool that is abrupt and sometimes she is incontinent  of stool for 2/3 weeks. Has not tried anything OTC, Also admits to drinking diet soda only , no water. Advise she cut the diet soda out and drink water instead . Will try imodium OTC and bland diet. Will call if symptoms persist or worsen. Also mentioned she has sensation of heart beating faster which may be due to dehydration. She will call her cardiology office now to discuss her symptoms . I provided her the number for  [facility] .    Reason for Disposition   MILD diarrhea (e.g., 1-3 or more stools than normal in past 24 hours) diarrhea without known cause and present > 7 days    Answer Assessment - Initial Assessment Questions  1. DIARRHEA SEVERITY: \"How bad is the diarrhea?\" \"How many extra stools have you had in the past 24 hours than normal?\"     - NO DIARRHEA (SCALE 0)    - MILD (SCALE 1-3): Few loose or mushy BMs; increase of 1-3 stools over normal daily number of stools; mild increase in ostomy output.    -  MODERATE (SCALE 4-7): Increase of 4-6 stools daily over normal; moderate increase in ostomy output.  * SEVERE (SCALE 8-10; OR 'WORST POSSIBLE'): Increase of 7 or more stools daily over normal; moderate increase in ostomy output; incontinence.      Mild to moderate  2. ONSET: \"When did the diarrhea begin?\"       2 weeks   3. BM CONSISTENCY: \"How loose or watery is the diarrhea?\"       Loose stool  4. VOMITING: \"Are you also vomiting?\" If Yes, ask: \"How many times in the past 24 hours?\"       no  5. ABDOMINAL PAIN: \"Are you having any abdominal pain?\" If Yes, ask: \"What does it feel like?\" (e.g., crampy, dull, intermittent, constant)       Episodal pain  6. ABDOMINAL PAIN SEVERITY: If present, ask: \"How bad is the pain?\"  (e.g., Scale 1-10; mild, moderate, or severe)    - MILD (1-3): " "doesn't interfere with normal activities, abdomen soft and not tender to touch     - MODERATE (4-7): interferes with normal activities or awakens from sleep, tender to touch     - SEVERE (8-10): excruciating pain, doubled over, unable to do any normal activities        mild  7. ORAL INTAKE: If vomiting, \"Have you been able to drink liquids?\" \"How much fluids have you had in the past 24 hours?\"      Drinking diet coke   8. HYDRATION: \"Any signs of dehydration?\" (e.g., dry mouth [not just dry lips], too weak to stand, dizziness, new weight loss) \"When did you last urinate?\"      Denies   9. EXPOSURE: \"Have you traveled to a foreign country recently?\" \"Have you been exposed to anyone with diarrhea?\" \"Could you have eaten any food that was spoiled?\"      denies  10. ANTIBIOTIC USE: \"Are you taking antibiotics now or have you taken antibiotics in the past 2 months?\"        Was taking eye drops but stopped them   11. OTHER SYMPTOMS: \"Do you have any other symptoms?\" (e.g., fever, blood in stool)        Denies but says her stool is reddish brown  12. PREGNANCY: \"Is there any chance you are pregnant?\" \"When was your last menstrual period?\"        N/a    Protocols used: Diarrhea-ADULT-[place]    "

## 2024-10-16 ENCOUNTER — OFFICE VISIT (OUTPATIENT)
Dept: INTERNAL MEDICINE CLINIC | Facility: CLINIC | Age: 79
End: 2024-10-16
Payer: COMMERCIAL

## 2024-10-16 ENCOUNTER — TELEPHONE (OUTPATIENT)
Dept: INTERNAL MEDICINE CLINIC | Facility: CLINIC | Age: 79
End: 2024-10-16

## 2024-10-16 ENCOUNTER — TELEPHONE (OUTPATIENT)
Age: 79
End: 2024-10-16

## 2024-10-16 VITALS
RESPIRATION RATE: 20 BRPM | BODY MASS INDEX: 39.92 KG/M2 | HEIGHT: 59 IN | OXYGEN SATURATION: 99 % | WEIGHT: 198 LBS | DIASTOLIC BLOOD PRESSURE: 80 MMHG | SYSTOLIC BLOOD PRESSURE: 130 MMHG

## 2024-10-16 DIAGNOSIS — R07.9 CHEST PAIN, UNSPECIFIED TYPE: Primary | ICD-10-CM

## 2024-10-16 DIAGNOSIS — I15.9 SECONDARY HYPERTENSION: ICD-10-CM

## 2024-10-16 DIAGNOSIS — F41.9 ANXIETY: ICD-10-CM

## 2024-10-16 DIAGNOSIS — R19.7 DIARRHEA, UNSPECIFIED TYPE: Primary | ICD-10-CM

## 2024-10-16 DIAGNOSIS — E66.01 CLASS 3 SEVERE OBESITY DUE TO EXCESS CALORIES WITHOUT SERIOUS COMORBIDITY IN ADULT, UNSPECIFIED BMI (HCC): ICD-10-CM

## 2024-10-16 DIAGNOSIS — E66.813 CLASS 3 SEVERE OBESITY DUE TO EXCESS CALORIES WITHOUT SERIOUS COMORBIDITY IN ADULT, UNSPECIFIED BMI (HCC): ICD-10-CM

## 2024-10-16 DIAGNOSIS — F33.2 MDD (MAJOR DEPRESSIVE DISORDER), RECURRENT SEVERE, WITHOUT PSYCHOSIS (HCC): ICD-10-CM

## 2024-10-16 PROCEDURE — 99214 OFFICE O/P EST MOD 30 MIN: CPT | Performed by: INTERNAL MEDICINE

## 2024-10-16 PROCEDURE — G2211 COMPLEX E/M VISIT ADD ON: HCPCS | Performed by: INTERNAL MEDICINE

## 2024-10-16 RX ORDER — VENLAFAXINE HYDROCHLORIDE 150 MG/1
150 CAPSULE, EXTENDED RELEASE ORAL DAILY
Qty: 30 CAPSULE | Refills: 1 | Status: SHIPPED | OUTPATIENT
Start: 2024-10-16

## 2024-10-16 NOTE — TELEPHONE ENCOUNTER
LM to CB, needs to be evaluate in office with Resident + Sarath for diarrhea without known cause and present >7 days. Transfer call into office for scheduling.

## 2024-10-16 NOTE — TELEPHONE ENCOUNTER
At checkout Pt wanted me to ask you to be referred to  to a cardiologist and someone you suggest personally, because she was having some chest weakness yesterday but forgot to tell you

## 2024-10-16 NOTE — PROGRESS NOTES
Assessment/Plan:    No problem-specific Assessment & Plan notes found for this encounter.         Problem List Items Addressed This Visit    None        Subjective:      Patient ID: Yajaira Marsh is a 78 y.o. female.    HPI 78-year-old female who is coming in with a chief complaint of 6 weeks of diarrhea; she reports me the stool is mushy in nature reports a 4-5 episodes per day no blood in the stool no melena reports me fatigue difficulty with sleep reports me notes no bowel movement in the last 2 days she has now been using Imodium. Unhappy with where she lives I hate the place , bed room fell apart, worked up about the situation in debt,  can't move  can't fall back to sleep  no si ,     The following portions of the patient's history were reviewed and updated as appropriate: allergies, current medications, past family history, past medical history, past social history, past surgical history and problem list.    Review of Systems      Objective:    No follow-ups on file.    No results found.      Allergies   Allergen Reactions   • Molds & Smuts Allergic Rhinitis   • Other Allergic Rhinitis     RAGWEED, CAT DANDER, DOG DANDER    • Pollen Extract Other (See Comments)     Cold symptoms         Past Medical History:   Diagnosis Date   • Acute embolism and thrombosis of unspecified deep veins of unspecified lower extremity (HCC)     Last Assessed:  5/18/17   • Anemia    • Anosmia    • Anxiety    • Arthritis    • Asthma    • Back pain    • Bilateral macular retinal edema    • CAD (coronary artery disease)    • Cataract    • Cervical disc herniation    • Cervical radiculopathy    • Cervical spinal stenosis    • Cervical spondylolysis    • Chronic kidney disease    • Chronic mastoiditis    • Colon polyp    • Complex endometrial hyperplasia    • Depression    • Diabetes mellitus (HCC)    • Disease of thyroid gland    • DVT (deep venous thrombosis) ([facility])    • DVT (deep venous thrombosis) ([facility]) 06/16/2017   •  "Fibromyalgia    • Fibromyalgia, primary    • Hyperlipidemia    • Hypertension    • Hypothyroid    • Kidney stone    • Lumbar radiculopathy    • Neck pain    • Obese    • PONV (postoperative nausea and vomiting)    • Shingles 07/01/2021   • Spinal stenosis    • Stomach ulcer    • Thyroid disease    • Vascular claudication (HCC) 12/11/2017     Past Surgical History:   Procedure Laterality Date   • BACK SURGERY     • CARPAL TUNNEL RELEASE     • CATARACT EXTRACTION     • CHOLECYSTECTOMY     • COLONOSCOPY     • CORONARY ANGIOPLASTY     • CORONARY ARTERY BYPASS GRAFT     • CYSTOSCOPY N/A 6/20/2017    Procedure: CYSTOSCOPY;  Surgeon: Tacho Arnold MD;  Location: [facility];  Service: Gynecology Oncology   • CYSTOSCOPY     • PA LAPS TOTAL HYSTERECT 250 GM/< W/RMVL TUBE/OVARY N/A 6/20/2017    Procedure: ROBOTIC HYSTERECTOMY; BILATERAL SALPINO-OOPHERECTOMY; umbilical hernia repair.;  Surgeon: Tacho Arnold MD;  Location: [facility];  Service: Gynecology Oncology   • PA REPAIR FIRST ABDOMINAL WALL HERNIA N/A 5/12/2022    Procedure: REPAIR HERNIA INCISIONAL;  Surgeon: Horacio Scherer DO;  Location: [facility];  Service: General   • TONSILECTOMY AND ADNOIDECTOMY     • TONSILLECTOMY     • UMBILICAL HERNIA REPAIR       Current Outpatient Medications on File Prior to Visit   Medication Sig Dispense Refill   • ALPRAZolam (XANAX) 0.5 mg tablet Take 1 tablet (0.5 mg total) by mouth 2 (two) times a day as needed for anxiety 60 tablet 0   • aspirin 81 MG tablet Take 81 mg by mouth daily     • B-D UF III MINI PEN NEEDLES 31G X 5 MM MISC      • BAQSIMI TWO PACK 3 MG/DOSE POWD Use as directed   0   • BD Insulin Syringe U/F 31G X 5/16\" 0.5 ML MISC 4 (four) times a day As directed     • cholecalciferol (VITAMIN D3) 1,000 units tablet Take 2,000 Units by mouth daily     • Coenzyme Q10 (Co Q-10) 200 MG CAPS Take by mouth in the morning     • Continuous Blood Gluc Sensor (Dexcom G6 Sensor) MISC Use 1 each daily at bedtime as needed " (scalp itching) 1 each 0   • Continuous Blood Gluc Sensor (Dexcom G7 Sensor) Use 1 Device     • diltiazem (TIAZAC) 300 MG 24 hr capsule Take 1 capsule (300 mg total) by mouth daily Tiddylt 90 capsule 0   • Docusate Sodium (DSS) 100 MG CAPS Take 1 capsule by mouth every 12 (twelve) hours     • insulin aspart (NovoLOG FlexPen) 100 UNIT/ML injection pen PLEASE SEE ATTACHED FOR DETAILED DIRECTIONS     • insulin aspart (NovoLOG FlexPen) 100 UNIT/ML injection pen Inject 18 Units under the skin 2 (two) times a day with meals     • isosorbide mononitrate (IMDUR) 60 mg 24 hr tablet      • ketoconazole (NIZORAL) 2 % shampoo APPLY TOPICALLY 1 APPLICATION ONCE FOR 1 DOSE 120 mL 0   • levothyroxine 75 mcg tablet Take 75 mcg by mouth daily     • nitroglycerin (NITROLINGUAL) 0.4 mg/spray spray   1   • rosuvastatin (CRESTOR) 20 MG tablet Take 1 tablet (20 mg total) by mouth every evening 90 tablet 1   • torsemide (DEMADEX) 20 mg tablet TAKE 0.5 TABLETS (10 MG TOTAL) BY MOUTH AS NEEDED (TAKE UP TO 2 TIMES PER WEEK FOR LEG SWELLING AS NEEDED) 45 tablet 1   • Toujeo SoloStar 300 units/mL CONCENTRATED U-300 injection pen (1-unit dial) Inject 62 Units under the skin in the morning     • diclofenac sodium (VOLTAREN) 1 % Apply 2 g topically 4 (four) times a day (Patient not taking: Reported on 10/16/2024) 1 Tube 0   • erythromycin (ILOTYCIN) ophthalmic ointment Administer 0.5 inches to both eyes daily at bedtime (Patient not taking: Reported on 10/16/2024) 3.5 g 0   • irbesartan (AVAPRO) 300 mg tablet Take 300 mg by mouth     • tobramycin-dexamethasone (TOBRADEX) ophthalmic suspension Administer 1 drop to the right eye every 4 (four) hours while awake (Patient not taking: Reported on 10/16/2024) 5 mL 0   • venlafaxine (EFFEXOR-XR) 75 mg 24 hr capsule Take 1 capsule (75 mg total) by mouth daily (Patient not taking: Reported on 10/16/2024) 90 capsule 1     No current facility-administered medications on file prior to visit.     Family  History   Problem Relation Age of Onset   • Arthritis Mother    • Leukemia Mother    • Other Mother         Anxiety, major depressive disorder, recurrent episode with atypical features   • Coronary artery disease Father         Heart problem   • Diabetes Father    • Other Father         Infectious disease   • Alzheimer's disease Maternal Grandmother    • Other Maternal Grandfather         Heart problem   • Other Daughter         Anxiety, major depressive disorder, recurrent episode with atypical features   • Alcohol abuse Other         Grandparent   • Cancer Family    • Diabetes Family    • Hypertension Family    • Other Family         Reported prior back trouble, thyroid disorder     Social History     Socioeconomic History   • Marital status: /Civil Union     Spouse name: Not on file   • Number of children: 2   • Years of education: High school or GED   • Highest education level: Not on file   Occupational History   • Occupation: Retired   Tobacco Use   • Smoking status: Former     Current packs/day: 0.00     Average packs/day: 1 pack/day for 6.0 years (6.0 ttl pk-yrs)     Types: Cigarettes     Start date:      Quit date:      Years since quittin.8   • Smokeless tobacco: Never   • Tobacco comments:     Smoked about a half a pack for about 4 yrs.  Quite about 50 yrs ago.   Vaping Use   • Vaping status: Never Used   Substance and Sexual Activity   • Alcohol use: No   • Drug use: No   • Sexual activity: Not Currently     Comment: No known STD risk factors   Other Topics Concern   • Not on file   Social History Narrative    Lives independently with spouse    No known risk factors    Denied:  Exercising regularly     Social Determinants of Health     Financial Resource Strain: Low Risk  (2024)    Overall Financial Resource Strain (CARDIA)    • Difficulty of Paying Living Expenses: Not hard at all   Food Insecurity: No Food Insecurity (2023)    Received from [facility],  [facility]    Hunger Vital Sign    • Worried About Running Out of Food in the Last Year: Never true    • Ran Out of Food in the Last Year: Never true   Transportation Needs: No Transportation Needs (1/24/2024)    PRAPARE - Transportation    • Lack of Transportation (Medical): No    • Lack of Transportation (Non-Medical): No   Physical Activity: Not on file   Stress: Not on file   Social Connections: Not on file   Intimate Partner Violence: Not At Risk (9/7/2023)    [facility]    Humiliation, Afraid, Rape, and Kick questionnaire    • Fear of Current or Ex-Partner: No    • Emotionally Abused: No    • Physically Abused: No    • Sexually Abused: No   Housing Stability: Low Risk  (9/7/2023)    [facility], [facility]    Housing Stability Vital Sign    • Unable to Pay for Housing in the Last Year: No    • Number of Places Lived in the Last Year: 1    • Unstable Housing in the Last Year: No     Vitals:    10/16/24 1321   BP: 130/80   BP Location: Left arm   Patient Position: Sitting   Cuff Size: Standard   Resp: 20   SpO2: 99%   Weight: 89.8 kg (198 lb)   Height: 4' 11\" (1.499 m)     Results for orders placed or performed during the hospital encounter of 11/14/23   Fingerstick Glucose (POCT)    Collection Time: 11/14/23 12:42 PM   Result Value Ref Range    POC Glucose 219 (H) 65 - 140 mg/dl   CBC and differential    Collection Time: 11/14/23  2:52 PM   Result Value Ref Range    WBC 10.22 (H) 4.31 - 10.16 Thousand/uL    RBC 4.59 3.81 - 5.12 Million/uL    Hemoglobin 12.9 11.5 - 15.4 g/dL    Hematocrit 39.5 34.8 - 46.1 %    MCV 86 82 - 98 fL    MCH 28.1 26.8 - 34.3 pg    MCHC 32.7 31.4 - 37.4 g/dL    RDW 14.5 11.6 - 15.1 %    MPV 11.7 8.9 - 12.7 fL    Platelets 255 149 - 390 Thousands/uL    nRBC 0 /100 WBCs    Segmented % 63 43 - 75 %    Immature Grans % 1 0 - 2 %    Lymphocytes % 28 14 - 44 %    " "Monocytes % 7 4 - 12 %    Eosinophils Relative 0 0 - 6 %    Basophils Relative 1 0 - 1 %    Absolute Neutrophils 6.58 1.85 - 7.62 Thousands/µL    Absolute Immature Grans 0.05 0.00 - 0.20 Thousand/uL    Absolute Lymphocytes 2.81 0.60 - 4.47 Thousands/µL    Absolute Monocytes 0.71 0.17 - 1.22 Thousand/µL    Eosinophils Absolute 0.02 0.00 - 0.61 Thousand/µL    Basophils Absolute 0.05 0.00 - 0.10 Thousands/µL   TSH, 3rd generation with Free T4 reflex    Collection Time: 11/14/23  2:52 PM   Result Value Ref Range    TSH 3RD GENERATON 3.003 0.450 - 4.500 uIU/mL   Comprehensive metabolic panel    Collection Time: 11/14/23  2:52 PM   Result Value Ref Range    Sodium 136 135 - 147 mmol/L    Potassium 4.5 3.5 - 5.3 mmol/L    Chloride 102 96 - 108 mmol/L    CO2 22 21 - 32 mmol/L    ANION GAP 12 mmol/L    BUN 20 5 - 25 mg/dL    Creatinine 1.54 (H) 0.60 - 1.30 mg/dL    Glucose 191 (H) 65 - 140 mg/dL    Calcium 10.0 8.4 - 10.2 mg/dL    AST 41 (H) 13 - 39 U/L    ALT 30 7 - 52 U/L    Alkaline Phosphatase 119 (H) 34 - 104 U/L    Total Protein 8.3 6.4 - 8.4 g/dL    Albumin 4.3 3.5 - 5.0 g/dL    Total Bilirubin 0.73 0.20 - 1.00 mg/dL    eGFR 32 ml/min/1.73sq m     *Note: Due to a large number of results and/or encounters for the requested time period, some results have not been displayed. A complete set of results can be found in Results Review.     Weight (last 2 days)       Date/Time Weight    10/16/24 1321 89.8 (198)          Body mass index is 39.99 kg/m².  /80 (10/16/24 1321)    Temp      Pulse     Resp 20 (10/16/24 1321)    SpO2 99 % (10/16/24 1321)      Vitals:    10/16/24 1321   Weight: 89.8 kg (198 lb)     Vitals:    10/16/24 1321   Weight: 89.8 kg (198 lb)       /80 (BP Location: Left arm, Patient Position: Sitting, Cuff Size: Standard)   Resp 20   Ht 4' 11\" (1.499 m)   Wt 89.8 kg (198 lb)   SpO2 99%   BMI 39.99 kg/m²          Physical Exam    "

## 2024-10-16 NOTE — TELEPHONE ENCOUNTER
Writer attempted to contact pt regarding referral for [facility] to verify services needed and place pt on proper wait list. Lvm to call writer back.

## 2024-10-17 PROBLEM — R19.7 DIARRHEA: Status: ACTIVE | Noted: 2024-10-17

## 2024-10-17 NOTE — ASSESSMENT & PLAN NOTE
Patient reports me ongoing anxiety and depression reports me frustration with regards to her living environment no suicidal ideation patient would like to start counseling I have put an order in for the patient to see counselor Taylor Meza also will increase the dose of Effexor XR to 150 mg once daily today I did provide supportive listening and counseling to patient  Orders:    Ambulatory referral to Psych Services; Future    venlafaxine (EFFEXOR-XR) 150 mg 24 hr capsule; Take 1 capsule (150 mg total) by mouth daily

## 2024-10-17 NOTE — ASSESSMENT & PLAN NOTE
Depression no suicidal ideation related to living environment will increase dose of Effexor XR to 150 mg daily will have patient work with counselor Taylor UMANZOR in 3 months call if any problems

## 2024-10-17 NOTE — ASSESSMENT & PLAN NOTE
"Patient does report to me a \"mushy\"type of diarrhea for the last 6 weeks she does report to me the symptoms started after visiting the vet no fevers no chills she does not report to me eating undercooked or spoiled foods or raw food; she has not had a recent antibiotic exposure or travel out of the country suspect most likely related to her increased anxiety but will rule out infectious nature she has not had a recent colonoscopy we will also after completion of the cultures check a colonoscopy  Orders:    Stool Enteric Bacterial Panel by PCR; Future    Giardia antigen; Future    Clostridium difficile toxin by PCR with EIA; Future    Ambulatory Referral to Gastroenterology; Future    "

## 2024-10-17 NOTE — PROGRESS NOTES
"Ambulatory Visit  Name: Yajaira Marsh      : 1945      MRN: [id number]  Encounter Provider: Sukumar Clemens DO  Encounter Date: 10/16/2024   Encounter [facility]: [facility]    Assessment & Plan  Diarrhea, unspecified type  Patient does report to me a \"mushy\"type of diarrhea for the last 6 weeks she does report to me the symptoms started after visiting the vet no fevers no chills she does not report to me eating undercooked or spoiled foods or raw food; she has not had a recent antibiotic exposure or travel out of the country suspect most likely related to her increased anxiety but will rule out infectious nature she has not had a recent colonoscopy we will also after completion of the cultures check a colonoscopy  Orders:    Stool Enteric Bacterial Panel by PCR; Future    Giardia antigen; Future    Clostridium difficile toxin by PCR with EIA; Future    Ambulatory Referral to Gastroenterology; Future    Anxiety  Patient reports me ongoing anxiety and depression reports me frustration with regards to her living environment no suicidal ideation patient would like to start counseling I have put an order in for the patient to see counselor Taylor Meza also will increase the dose of Effexor XR to 150 mg once daily today I did provide supportive listening and counseling to patient  Orders:    Ambulatory referral to Psych Services; Future    venlafaxine (EFFEXOR-XR) 150 mg 24 hr capsule; Take 1 capsule (150 mg total) by mouth daily    Class 3 severe obesity due to excess calories without serious comorbidity in adult, unspecified BMI (HCC)  Obesity -I have counseled patient following healthy and balanced diet, I would like the patient to lose weight, I would like the patient exercise routinely; we will continue monitor the patient's progress.         Secondary hypertension  Hypertension - controlled, I have counseled patient following healthy balance diet, I would like the " patient reduce sodium, exercise routinely, I would like the patient continued the med current medical regiment and we will continue to monitor.  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  The patient is here to review his medical condition, update me on the medical condition and the patient reports me no hospitalizations and no ER visits.  No injuries the patient does report to me diarrhea for the last 6 weeks no recent travel out of the country she does not report uncooked, raw, spoiled food who is coming in with a chief complaint of 6 weeks of diarrhea; she reports me the stool is mushy in nature reports a 4-5 episodes per day no blood in the stool no melena reports me fatigue difficulty with sleep reports me notes no bowel movement in the last 2 days she has now been using Imodium. Unhappy with where she lives I hate the place , bed room fell apart, worked up about the situation in debt,  can't move  can't fall back to sleep  no si , would like to work with a counselor she tolerates the current dose of Effexor no fever no chills no blood in the stool no melena   History obtained from : patient  Review of Systems   Constitutional:  Negative for activity change, appetite change and unexpected weight change.   HENT:  Negative for congestion and postnasal drip.    Eyes:  Negative for visual disturbance.   Respiratory:  Negative for cough and shortness of breath.   "  Cardiovascular:  Negative for chest pain.   Gastrointestinal:  Positive for diarrhea. Negative for abdominal pain, nausea and vomiting.   Neurological:  Negative for dizziness, light-headedness and headaches.   Hematological:  Negative for adenopathy.   Psychiatric/Behavioral:  Negative for suicidal ideas. The patient is nervous/anxious.         Depression     Social History     Tobacco Use    Smoking status: Former     Current packs/day: 0.00     Average packs/day: 1 pack/day for 6.0 years (6.0 ttl pk-yrs)     Types: Cigarettes     Start date:      Quit date:      Years since quittin.8    Smokeless tobacco: Never    Tobacco comments:     Smoked about a half a pack for about 4 yrs.  Quite about 50 yrs ago.   Vaping Use    Vaping status: Never Used   Substance and Sexual Activity    Alcohol use: No    Drug use: No    Sexual activity: Not Currently     Comment: No known STD risk factors         Objective     /80 (BP Location: Left arm, Patient Position: Sitting, Cuff Size: Standard)   Resp 20   Ht 4' 11\" (1.499 m)   Wt 89.8 kg (198 lb)   SpO2 99%   BMI 39.99 kg/m²     Physical Exam  Vitals and nursing note reviewed.   Constitutional:       General: She is not in acute distress.     Appearance: Normal appearance. She is well-developed. She is obese. She is not ill-appearing, toxic-appearing or diaphoretic.   HENT:      Head: Normocephalic.      Right Ear: Tympanic membrane normal.      Left Ear: Tympanic membrane normal.   Eyes:      General: No scleral icterus.        Right eye: No discharge.         Left eye: No discharge.      Conjunctiva/sclera: Conjunctivae normal.      Pupils: Pupils are equal, round, and reactive to light.   Cardiovascular:      Rate and Rhythm: Normal rate and regular rhythm.      Heart sounds: Normal heart sounds. No murmur heard.     No friction rub. No gallop.   Pulmonary:      Effort: No respiratory distress.      Breath sounds: Normal breath sounds. No wheezing or " rales.   Abdominal:      General: Bowel sounds are normal. There is no distension.      Palpations: Abdomen is soft. There is no mass.      Tenderness: There is no abdominal tenderness. There is no guarding or rebound.   Musculoskeletal:         General: No deformity.      Cervical back: Neck supple.   Lymphadenopathy:      Cervical: No cervical adenopathy.   Neurological:      Mental Status: She is alert.      Coordination: Coordination normal.   Psychiatric:         Mood and Affect: Mood is anxious and depressed.         Thought Content: Thought content does not include suicidal ideation.

## 2024-10-17 NOTE — ASSESSMENT & PLAN NOTE
Hypertension - controlled, I have counseled patient following healthy balance diet, I would like the patient reduce sodium, exercise routinely, I would like the patient continued the med current medical regiment and we will continue to monitor.  Continue Avapro 300 mg once daily, diltiazem 300 mg once daily

## 2024-10-22 NOTE — TELEPHONE ENCOUNTER
Writer contacted pt to verify services needed and explained to her that there is no opening available at this time. Pt agreed to be place on [facility] wait list. Referral closed.

## 2024-10-30 PROBLEM — H10.9 CONJUNCTIVITIS: Status: RESOLVED | Noted: 2024-09-30 | Resolved: 2024-10-30

## 2024-11-01 ENCOUNTER — HOSPITAL ENCOUNTER (INPATIENT)
Facility: HOSPITAL | Age: 79
LOS: 2 days | Discharge: HOME | DRG: 638 | End: 2024-11-04
Attending: EMERGENCY MEDICINE | Admitting: STUDENT IN AN ORGANIZED HEALTH CARE EDUCATION/TRAINING PROGRAM
Payer: COMMERCIAL

## 2024-11-01 DIAGNOSIS — F33.2 MDD (MAJOR DEPRESSIVE DISORDER), RECURRENT SEVERE, WITHOUT PSYCHOSIS (HCC): ICD-10-CM

## 2024-11-01 DIAGNOSIS — N17.9 AKI (ACUTE KIDNEY INJURY) (HCC): Primary | ICD-10-CM

## 2024-11-01 DIAGNOSIS — R73.9 HYPERGLYCEMIA: ICD-10-CM

## 2024-11-01 PROBLEM — N18.9 ACUTE KIDNEY INJURY SUPERIMPOSED ON CHRONIC KIDNEY DISEASE  (HCC): Status: ACTIVE | Noted: 2024-11-01

## 2024-11-01 LAB
ALBUMIN SERPL BCG-MCNC: 4.1 G/DL (ref 3.5–5)
ALP SERPL-CCNC: 116 U/L (ref 34–104)
ALT SERPL W P-5'-P-CCNC: 20 U/L (ref 7–52)
ANION GAP SERPL CALCULATED.3IONS-SCNC: 9 MMOL/L (ref 4–13)
AST SERPL W P-5'-P-CCNC: 21 U/L (ref 13–39)
BACTERIA UR QL AUTO: ABNORMAL /HPF
BASE EX.OXY STD BLDV CALC-SCNC: 79.7 % (ref 60–80)
BASE EXCESS BLDV CALC-SCNC: -1.5 MMOL/L
BASOPHILS # BLD AUTO: 0.04 THOUSANDS/ΜL (ref 0–0.1)
BASOPHILS NFR BLD AUTO: 0 % (ref 0–1)
BILIRUB SERPL-MCNC: 0.47 MG/DL (ref 0.2–1)
BILIRUB UR QL STRIP: NEGATIVE
BUN SERPL-MCNC: 38 MG/DL (ref 5–25)
CALCIUM SERPL-MCNC: 9.8 MG/DL (ref 8.4–10.2)
CHLORIDE SERPL-SCNC: 102 MMOL/L (ref 96–108)
CLARITY UR: CLEAR
CO2 SERPL-SCNC: 25 MMOL/L (ref 21–32)
COLOR UR: ABNORMAL
CREAT SERPL-MCNC: 2.68 MG/DL (ref 0.6–1.3)
EOSINOPHIL # BLD AUTO: 0.09 THOUSAND/ΜL (ref 0–0.61)
EOSINOPHIL NFR BLD AUTO: 1 % (ref 0–6)
ERYTHROCYTE [DISTWIDTH] IN BLOOD BY AUTOMATED COUNT: 13.2 % (ref 11.6–15.1)
GFR SERPL CREATININE-BSD FRML MDRD: 16 ML/MIN/1.73SQ M
GLUCOSE SERPL-MCNC: 246 MG/DL (ref 65–140)
GLUCOSE SERPL-MCNC: 288 MG/DL (ref 65–140)
GLUCOSE SERPL-MCNC: 369 MG/DL (ref 65–140)
GLUCOSE UR STRIP-MCNC: ABNORMAL MG/DL
HCO3 BLDV-SCNC: 22.8 MMOL/L (ref 24–30)
HCT VFR BLD AUTO: 35.3 % (ref 34.8–46.1)
HGB BLD-MCNC: 11.5 G/DL (ref 11.5–15.4)
HGB UR QL STRIP.AUTO: ABNORMAL
IMM GRANULOCYTES # BLD AUTO: 0.04 THOUSAND/UL (ref 0–0.2)
IMM GRANULOCYTES NFR BLD AUTO: 0 % (ref 0–2)
KETONES UR STRIP-MCNC: NEGATIVE MG/DL
LEUKOCYTE ESTERASE UR QL STRIP: ABNORMAL
LYMPHOCYTES # BLD AUTO: 2.7 THOUSANDS/ΜL (ref 0.6–4.47)
LYMPHOCYTES NFR BLD AUTO: 27 % (ref 14–44)
MCH RBC QN AUTO: 29 PG (ref 26.8–34.3)
MCHC RBC AUTO-ENTMCNC: 32.6 G/DL (ref 31.4–37.4)
MCV RBC AUTO: 89 FL (ref 82–98)
MONOCYTES # BLD AUTO: 0.82 THOUSAND/ΜL (ref 0.17–1.22)
MONOCYTES NFR BLD AUTO: 8 % (ref 4–12)
NEUTROPHILS # BLD AUTO: 6.34 THOUSANDS/ΜL (ref 1.85–7.62)
NEUTS SEG NFR BLD AUTO: 64 % (ref 43–75)
NITRITE UR QL STRIP: NEGATIVE
NON-SQ EPI CELLS URNS QL MICRO: ABNORMAL /HPF
NRBC BLD AUTO-RTO: 0 /100 WBCS
O2 CT BLDV-SCNC: 14 ML/DL
PCO2 BLDV: 37 MM HG (ref 42–50)
PH BLDV: 7.41 [PH] (ref 7.3–7.4)
PH UR STRIP.AUTO: 5 [PH]
PLATELET # BLD AUTO: 230 THOUSANDS/UL (ref 149–390)
PMV BLD AUTO: 11.3 FL (ref 8.9–12.7)
PO2 BLDV: 44.2 MM HG (ref 35–45)
POTASSIUM SERPL-SCNC: 4.3 MMOL/L (ref 3.5–5.3)
PROT SERPL-MCNC: 7.9 G/DL (ref 6.4–8.4)
PROT UR STRIP-MCNC: ABNORMAL MG/DL
RBC # BLD AUTO: 3.97 MILLION/UL (ref 3.81–5.12)
RBC #/AREA URNS AUTO: ABNORMAL /HPF
SODIUM SERPL-SCNC: 136 MMOL/L (ref 135–147)
SP GR UR STRIP.AUTO: 1.02 (ref 1–1.03)
UROBILINOGEN UR STRIP-ACNC: <2 MG/DL
WBC # BLD AUTO: 10.03 THOUSAND/UL (ref 4.31–10.16)
WBC #/AREA URNS AUTO: ABNORMAL /HPF

## 2024-11-01 PROCEDURE — 82805 BLOOD GASES W/O2 SATURATION: CPT

## 2024-11-01 PROCEDURE — 81001 URINALYSIS AUTO W/SCOPE: CPT

## 2024-11-01 PROCEDURE — 80053 COMPREHEN METABOLIC PANEL: CPT

## 2024-11-01 PROCEDURE — 99222 1ST HOSP IP/OBS MODERATE 55: CPT | Performed by: PHYSICIAN ASSISTANT

## 2024-11-01 PROCEDURE — 82948 REAGENT STRIP/BLOOD GLUCOSE: CPT

## 2024-11-01 PROCEDURE — 96360 HYDRATION IV INFUSION INIT: CPT

## 2024-11-01 PROCEDURE — 99285 EMERGENCY DEPT VISIT HI MDM: CPT | Performed by: EMERGENCY MEDICINE

## 2024-11-01 PROCEDURE — 36415 COLL VENOUS BLD VENIPUNCTURE: CPT

## 2024-11-01 PROCEDURE — 99285 EMERGENCY DEPT VISIT HI MDM: CPT

## 2024-11-01 PROCEDURE — 85025 COMPLETE CBC W/AUTO DIFF WBC: CPT

## 2024-11-01 RX ORDER — ACETAMINOPHEN 325 MG/1
650 TABLET ORAL EVERY 6 HOURS PRN
Status: DISCONTINUED | OUTPATIENT
Start: 2024-11-01 | End: 2024-11-04 | Stop reason: HOSPADM

## 2024-11-01 RX ORDER — INSULIN LISPRO 100 [IU]/ML
16 INJECTION, SOLUTION INTRAVENOUS; SUBCUTANEOUS
Status: DISCONTINUED | OUTPATIENT
Start: 2024-11-02 | End: 2024-11-04 | Stop reason: HOSPADM

## 2024-11-01 RX ORDER — SODIUM CHLORIDE 9 MG/ML
75 INJECTION, SOLUTION INTRAVENOUS CONTINUOUS
Status: DISCONTINUED | OUTPATIENT
Start: 2024-11-01 | End: 2024-11-04 | Stop reason: HOSPADM

## 2024-11-01 RX ORDER — INSULIN LISPRO 100 [IU]/ML
22 INJECTION, SOLUTION INTRAVENOUS; SUBCUTANEOUS
Status: DISCONTINUED | OUTPATIENT
Start: 2024-11-02 | End: 2024-11-04 | Stop reason: HOSPADM

## 2024-11-01 RX ORDER — INSULIN LISPRO 100 [IU]/ML
1-5 INJECTION, SOLUTION INTRAVENOUS; SUBCUTANEOUS
Status: DISCONTINUED | OUTPATIENT
Start: 2024-11-01 | End: 2024-11-04 | Stop reason: HOSPADM

## 2024-11-01 RX ORDER — ALPRAZOLAM 0.5 MG
0.5 TABLET ORAL 2 TIMES DAILY PRN
Status: DISCONTINUED | OUTPATIENT
Start: 2024-11-01 | End: 2024-11-04 | Stop reason: HOSPADM

## 2024-11-01 RX ORDER — INSULIN GLARGINE 100 [IU]/ML
72 INJECTION, SOLUTION SUBCUTANEOUS EVERY MORNING
Status: DISCONTINUED | OUTPATIENT
Start: 2024-11-02 | End: 2024-11-02

## 2024-11-01 RX ORDER — INSULIN LISPRO 100 [IU]/ML
1-6 INJECTION, SOLUTION INTRAVENOUS; SUBCUTANEOUS
Status: DISCONTINUED | OUTPATIENT
Start: 2024-11-02 | End: 2024-11-04 | Stop reason: HOSPADM

## 2024-11-01 RX ORDER — ISOSORBIDE MONONITRATE 60 MG/1
60 TABLET, EXTENDED RELEASE ORAL DAILY
Status: DISCONTINUED | OUTPATIENT
Start: 2024-11-02 | End: 2024-11-04 | Stop reason: HOSPADM

## 2024-11-01 RX ORDER — LEVOTHYROXINE SODIUM 75 UG/1
75 TABLET ORAL
Status: DISCONTINUED | OUTPATIENT
Start: 2024-11-02 | End: 2024-11-04 | Stop reason: HOSPADM

## 2024-11-01 RX ORDER — ATORVASTATIN CALCIUM 40 MG/1
40 TABLET, FILM COATED ORAL
Status: DISCONTINUED | OUTPATIENT
Start: 2024-11-01 | End: 2024-11-04 | Stop reason: HOSPADM

## 2024-11-01 RX ORDER — HEPARIN SODIUM 5000 [USP'U]/ML
5000 INJECTION, SOLUTION INTRAVENOUS; SUBCUTANEOUS EVERY 8 HOURS SCHEDULED
Status: DISCONTINUED | OUTPATIENT
Start: 2024-11-01 | End: 2024-11-04 | Stop reason: HOSPADM

## 2024-11-01 RX ADMIN — SODIUM CHLORIDE 1000 ML: 0.9 INJECTION, SOLUTION INTRAVENOUS at 19:11

## 2024-11-01 NOTE — ED PROVIDER NOTES
Time reflects when diagnosis was documented in both MDM as applicable and the Disposition within this note       Time User Action Codes Description Comment    11/1/2024  7:44 PM Roshan Harrington [N17.9] KATIE (acute kidney injury) (HCC)     11/1/2024  7:44 PM Roshan Harrington [R73.9] Hyperglycemia           ED Disposition       ED Disposition   Admit    Condition   Stable    Date/Time   Fri Nov 1, 2024  7:43 PM    Comment   Case was discussed with Dr. Cox and the patient's admission status was agreed to be Admission Status: observation status to the service of Dr. Cox.               Assessment & Plan       Medical Decision Making  See ed course    Amount and/or Complexity of Data Reviewed  Labs: ordered.        ED Course as of 11/01/24 1945 Fri Nov 01, 2024   1831 78F hx diabetes, ckd presenting for high blood sugar.  Pt got a new glucometer yesterday and states her glucose measures have been consistently high. Denies acute symptoms. States over the past several weeks she has been more thirsty and noticed some loose stools 1-2 times per day with no increase in stool frequency. Takes insulin daily. Otherwise denies fever, cp, sob, abd pain, n/v, change in urination.    1835 Echo 11/29/2023:  This result has an attachment that is not available.   Mild left ventricular hypertrophy with normal systolic function, EF ~65%,   and mild diastolic dysfunction   Mildly dilated right ventricle with mildly reduced systolic function   Atherosclerotic aortic root   Mild aortic sclerosis without stenosis   Mild mitral sclerosis with mild annular calcification and trivial to mild   mitral regurgitation   Compared to prior study of 05/05/2022, there have been no major changes.    1904 Differential includes hyperglycemia, dka, dehydration, electrolyte abnormalities.    1942 UA w Reflex to Microscopic w Reflex to Culture(!)  Low suspicion for uti given absence of symptoms.    1943 CBC and differential  wnl   1943 Creatinine(!):  2.68  damaris   1943 GLUCOSE(!): 288   1943 pH, Dae(!): 7.407  wnl   1944 Received 1L NS in ED. Discussed with internal medicine who will admit for observation.       Medications   sodium chloride 0.9 % bolus 1,000 mL (1,000 mL Intravenous New Bag 11/1/24 1911)       ED Risk Strat Scores                           SBIRT 22yo+      Flowsheet Row Most Recent Value   Initial Alcohol Screen: US AUDIT-C     1. How often do you have a drink containing alcohol? 0 Filed at: 11/01/2024 1802   2. How many drinks containing alcohol do you have on a typical day you are drinking?  0 Filed at: 11/01/2024 1802   3a. Male UNDER 65: How often do you have five or more drinks on one occasion? 0 Filed at: 11/01/2024 1802   3b. FEMALE Any Age, or MALE 65+: How often do you have 4 or more drinks on one occassion? 0 Filed at: 11/01/2024 1802   Audit-C Score 0 Filed at: 11/01/2024 1802   GERMAN: How many times in the past year have you...    Used an illegal drug or used a prescription medication for non-medical reasons? Never Filed at: 11/01/2024 1802                            History of Present Illness       Chief Complaint   Patient presents with    Medical Problem     Pt reports she changed her blood sugar monitor yesterday morning and it only says high since changing it. Pt denies symptoms, taking insulin. BG in triage 369.        Past Medical History:   Diagnosis Date    Acute embolism and thrombosis of unspecified deep veins of unspecified lower extremity (HCC)     Last Assessed:  5/18/17    Anemia     Anosmia     Anxiety     Arthritis     Asthma     Back pain     Bilateral macular retinal edema     CAD (coronary artery disease)     Cataract     Cervical disc herniation     Cervical radiculopathy     Cervical spinal stenosis     Cervical spondylolysis     Chronic kidney disease     Chronic mastoiditis     Colon polyp     Complex endometrial hyperplasia     Depression     Diabetes mellitus (HCC)     Disease of thyroid gland     DVT (deep  venous thrombosis) ([facility])     DVT (deep venous thrombosis) ([facility]) 06/16/2017    Fibromyalgia     Fibromyalgia, primary     Hyperlipidemia     Hypertension     Hypothyroid     Kidney stone     Lumbar radiculopathy     Neck pain     Obese     PONV (postoperative nausea and vomiting)     Shingles 07/01/2021    Spinal stenosis     Stomach ulcer     Thyroid disease     Vascular claudication ([facility]) 12/11/2017      Past Surgical History:   Procedure Laterality Date    BACK SURGERY      CARPAL TUNNEL RELEASE      CATARACT EXTRACTION      CHOLECYSTECTOMY      COLONOSCOPY      CORONARY ANGIOPLASTY      CORONARY ARTERY BYPASS GRAFT      CYSTOSCOPY N/A 6/20/2017    Procedure: CYSTOSCOPY;  Surgeon: Tacho Arnold MD;  Location: [facility];  Service: Gynecology Oncology    CYSTOSCOPY      WY LAPS TOTAL HYSTERECT 250 GM/< W/RMVL TUBE/OVARY N/A 6/20/2017    Procedure: ROBOTIC HYSTERECTOMY; BILATERAL SALPINO-OOPHERECTOMY; umbilical hernia repair.;  Surgeon: Tacho Arnold MD;  Location: [facility];  Service: Gynecology Oncology    WY REPAIR FIRST ABDOMINAL WALL HERNIA N/A 5/12/2022    Procedure: REPAIR HERNIA INCISIONAL;  Surgeon: Horacio Scherer DO;  Location: [facility];  Service: General    TONSILECTOMY AND ADNOIDECTOMY      TONSILLECTOMY      UMBILICAL HERNIA REPAIR        Family History   Problem Relation Age of Onset    Arthritis Mother     Leukemia Mother     Other Mother         Anxiety, major depressive disorder, recurrent episode with atypical features    Coronary artery disease Father         Heart problem    Diabetes Father     Other Father         Infectious disease    Alzheimer's disease Maternal Grandmother     Other Maternal Grandfather         Heart problem    Other Daughter         Anxiety, major depressive disorder, recurrent episode with atypical features    Alcohol abuse Other         Grandparent    Cancer Family     Diabetes Family     Hypertension Family     Other Family         Reported prior back  trouble, thyroid disorder      Social History     Tobacco Use    Smoking status: Former     Current packs/day: 0.00     Average packs/day: 1 pack/day for 6.0 years (6.0 ttl pk-yrs)     Types: Cigarettes     Start date:      Quit date:      Years since quittin.8    Smokeless tobacco: Never    Tobacco comments:     Smoked about a half a pack for about 4 yrs.  Quite about 50 yrs ago.   Vaping Use    Vaping status: Never Used   Substance Use Topics    Alcohol use: No    Drug use: No      E-Cigarette/Vaping    E-Cigarette Use Never User       E-Cigarette/Vaping Substances    Nicotine No     THC No     CBD No     Flavoring No     Other No     Unknown No       I have reviewed and agree with the history as documented.     78F hx diabetes, ckd presenting for high blood sugar.  Pt got a new glucometer yesterday and states her glucose measures have been consistently high. Denies acute symptoms. States over the past several weeks she has been more thirsty and noticed some loose stools 1-2 times per day with no increase in stool frequency. Takes insulin daily. Otherwise denies fever, cp, sob, abd pain, n/v, change in urination.       Medical Problem  Associated symptoms: no abdominal pain, no chest pain, no congestion, no cough, no ear pain, no fever, no rash, no shortness of breath, no sore throat and no vomiting        Review of Systems   Constitutional:  Negative for chills and fever.   HENT:  Negative for congestion, ear pain and sore throat.    Eyes:  Negative for visual disturbance.   Respiratory:  Negative for cough and shortness of breath.    Cardiovascular:  Negative for chest pain and palpitations.   Gastrointestinal:  Negative for abdominal pain and vomiting.   Genitourinary:  Negative for difficulty urinating, dysuria and hematuria.   Musculoskeletal:  Negative for arthralgias and back pain.   Skin:  Negative for color change and rash.   Neurological:  Negative for syncope.   All other systems reviewed  and are negative.          Objective       ED Triage Vitals [11/01/24 1756]   Temperature Pulse Blood Pressure Respirations SpO2 Patient Position - Orthostatic VS   98.5 °F (36.9 °C) 70 (!) 178/75 18 99 % Sitting      Temp Source Heart Rate Source BP Location FiO2 (%) Pain Score    Oral Monitor Right arm -- --      Vitals      Date and Time Temp Pulse SpO2 Resp BP Pain Score FACES Pain Rating User   11/01/24 1756 98.5 °F (36.9 °C) 70 99 % 18 178/75 -- -- C(S            Physical Exam  Vitals and nursing note reviewed.   Constitutional:       General: She is not in acute distress.     Appearance: She is well-developed.   HENT:      Head: Normocephalic and atraumatic.   Eyes:      Conjunctiva/sclera: Conjunctivae normal.   Cardiovascular:      Rate and Rhythm: Normal rate and regular rhythm.      Heart sounds: No murmur heard.  Pulmonary:      Effort: Pulmonary effort is normal. No respiratory distress.      Breath sounds: No stridor. No wheezing or rhonchi.   Abdominal:      General: There is distension.      Palpations: Abdomen is soft. There is no mass.      Tenderness: There is no abdominal tenderness. There is no guarding or rebound.      Hernia: No hernia is present.   Musculoskeletal:         General: No swelling.      Cervical back: Neck supple.   Skin:     General: Skin is warm and dry.      Capillary Refill: Capillary refill takes less than 2 seconds.   Neurological:      Mental Status: She is alert.   Psychiatric:         Mood and Affect: Mood normal.         Results Reviewed       Procedure Component Value Units Date/Time    Comprehensive metabolic panel [[id number]]  (Abnormal) Collected: 11/01/24 1911    Lab Status: Final result Specimen: Blood from Arm, Right Updated: 11/01/24 1935     Sodium 136 mmol/L      Potassium 4.3 mmol/L      Chloride 102 mmol/L      CO2 25 mmol/L      ANION GAP 9 mmol/L      BUN 38 mg/dL      Creatinine 2.68 mg/dL      Glucose 288 mg/dL      Calcium 9.8 mg/dL      AST 21 U/L       ALT 20 U/L      Alkaline Phosphatase 116 U/L      Total Protein 7.9 g/dL      Albumin 4.1 g/dL      Total Bilirubin 0.47 mg/dL      eGFR 16 ml/min/1.73sq m     Narrative:      National Kid[employer] guidelines for Chronic Kidney Disease (CKD):     Stage 1 with normal or high GFR (GFR > 90 mL/min/1.73 square meters)    Stage 2 Mild CKD (GFR = 60-89 mL/min/1.73 square meters)    Stage 3A Moderate CKD (GFR = 45-59 mL/min/1.73 square meters)    Stage 3B Moderate CKD (GFR = 30-44 mL/min/1.73 square meters)    Stage 4 Severe CKD (GFR = 15-29 mL/min/1.73 square meters)    Stage 5 End Stage CKD (GFR <15 mL/min/1.73 square meters)  Note: GFR calculation is accurate only with a steady state creatinine    Urine Microscopic [[id number]]  (Abnormal) Collected: 11/01/24 1905    Lab Status: Final result Specimen: Urine Updated: 11/01/24 1931     RBC, UA 1-2 /hpf      WBC, UA 4-10 /hpf      Epithelial Cells Occasional /hpf      Bacteria, UA Occasional /hpf     Blood gas, venous [[id number]]  (Abnormal) Collected: 11/01/24 1911    Lab Status: Final result Specimen: Blood from Arm, Right Updated: 11/01/24 1929     pH, Dae 7.407     pCO2, Dae 37.0 mm Hg      pO2, Dae 44.2 mm Hg      HCO3, Dae 22.8 mmol/L      Base Excess, Dae -1.5 mmol/L      O2 Content, Dae 14.0 ml/dL      O2 HGB, VENOUS 79.7 %     UA w Reflex to Microscopic w Reflex to Culture [[id number]]  (Abnormal) Collected: 11/01/24 1905    Lab Status: Final result Specimen: Urine Updated: 11/01/24 1926     Color, UA Light Yellow     Clarity, UA Clear     Specific Gravity, UA 1.016     pH, UA 5.0     Leukocytes, UA Small     Nitrite, UA Negative     Protein, UA Trace mg/dl      Glucose, UA >=1000 (1%) mg/dl      Ketones, UA Negative mg/dl      Urobilinogen, UA <2.0 mg/dl      Bilirubin, UA Negative     Occult Blood, UA Trace    CBC and differential [[id number]] Collected: 11/01/24 1911    Lab Status: Final result Specimen: Blood from Arm, Right Updated: 11/01/24 1925     " WBC 10.03 Thousand/uL      RBC 3.97 Million/uL      Hemoglobin 11.5 g/dL      Hematocrit 35.3 %      MCV 89 fL      MCH 29.0 pg      MCHC 32.6 g/dL      RDW 13.2 %      MPV 11.3 fL      Platelets 230 Thousands/uL      nRBC 0 /100 WBCs      Segmented % 64 %      Immature Grans % 0 %      Lymphocytes % 27 %      Monocytes % 8 %      Eosinophils Relative 1 %      Basophils Relative 0 %      Absolute Neutrophils 6.34 Thousands/µL      Absolute Immature Grans 0.04 Thousand/uL      Absolute Lymphocytes 2.70 Thousands/µL      Absolute Monocytes 0.82 Thousand/µL      Eosinophils Absolute 0.09 Thousand/µL      Basophils Absolute 0.04 Thousands/µL     Fingerstick Glucose (POCT) [[id number]]  (Abnormal) Collected: 24    Lab Status: Final result Specimen: Blood Updated: 24     POC Glucose 369 mg/dl             No orders to display       Procedures    ED Medication and Procedure Management   Prior to Admission Medications   Prescriptions Last Dose Informant Patient Reported? Taking?   ALPRAZolam (XANAX) 0.5 mg tablet   No No   Sig: Take 1 tablet (0.5 mg total) by mouth 2 (two) times a day as needed for anxiety   B-D UF III MINI PEN NEEDLES 31G X 5 MM MISC  Self Yes No   BAQSIMI TWO PACK 3 MG/DOSE POWD  Self Yes No   Sig: Use as directed    BD Insulin Syringe U/F 31G X 5/16\" 0.5 ML MISC  Self Yes No   Si (four) times a day As directed   Coenzyme Q10 (Co Q-10) 200 MG CAPS  Self Yes No   Sig: Take by mouth in the morning   Continuous Blood Gluc Sensor (Dexcom G6 Sensor) MISC  Self No No   Sig: Use 1 each daily at bedtime as needed (scalp itching)   Continuous Blood Gluc Sensor (Dexcom G7 Sensor)  Self Yes No   Sig: Use 1 Device   Docusate Sodium (DSS) 100 MG CAPS  Self Yes No   Sig: Take 1 capsule by mouth every 12 (twelve) hours   Toujeo SoloStar 300 units/mL CONCENTRATED U-300 injection pen (1-unit dial)  Self No No   Sig: Inject 62 Units under the skin in the morning   aspirin 81 MG tablet  Self Yes No "   Sig: Take 81 mg by mouth daily   cholecalciferol (VITAMIN D3) 1,000 units tablet  Self Yes No   Sig: Take 2,000 Units by mouth daily   diclofenac sodium (VOLTAREN) 1 %  Self No No   Sig: Apply 2 g topically 4 (four) times a day   Patient not taking: Reported on 10/16/2024   diltiazem (TIAZAC) 300 MG 24 hr capsule  Self No No   Sig: Take 1 capsule (300 mg total) by mouth daily Tiddylt   erythromycin (ILOTYCIN) ophthalmic ointment   No No   Sig: Administer 0.5 inches to both eyes daily at bedtime   Patient not taking: Reported on 10/16/2024   insulin aspart (NovoLOG FlexPen) 100 UNIT/ML injection pen  Self Yes No   Sig: PLEASE SEE ATTACHED FOR DETAILED DIRECTIONS   insulin aspart (NovoLOG FlexPen) 100 UNIT/ML injection pen  Self Yes No   Sig: Inject 18 Units under the skin 2 (two) times a day with meals   irbesartan (AVAPRO) 300 mg tablet  Self Yes No   Sig: Take 300 mg by mouth   isosorbide mononitrate (IMDUR) 60 mg 24 hr tablet  Self Yes No   ketoconazole (NIZORAL) 2 % shampoo  Self No No   Sig: APPLY TOPICALLY 1 APPLICATION ONCE FOR 1 DOSE   levothyroxine 75 mcg tablet  Self Yes No   Sig: Take 75 mcg by mouth daily   nitroglycerin (NITROLINGUAL) 0.4 mg/spray spray  Self Yes No   rosuvastatin (CRESTOR) 20 MG tablet  Self No No   Sig: Take 1 tablet (20 mg total) by mouth every evening   tobramycin-dexamethasone (TOBRADEX) ophthalmic suspension   No No   Sig: Administer 1 drop to the right eye every 4 (four) hours while awake   Patient not taking: Reported on 10/16/2024   torsemide (DEMADEX) 20 mg tablet  Self No No   Sig: TAKE 0.5 TABLETS (10 MG TOTAL) BY MOUTH AS NEEDED (TAKE UP TO 2 TIMES PER WEEK FOR LEG SWELLING AS NEEDED)   venlafaxine (EFFEXOR-XR) 150 mg 24 hr capsule   No No   Sig: Take 1 capsule (150 mg total) by mouth daily      Facility-Administered Medications: None     Patient's Medications   Discharge Prescriptions    No medications on file     No discharge procedures on file.  ED SEPSIS DOCUMENTATION    Time reflects when diagnosis was documented in both MDM as applicable and the Disposition within this note       Time User Action Codes Description Comment    11/1/2024  7:44 PM Roshan Harrington [N17.9] KATIE (acute kidney injury) (HCC)     11/1/2024  7:44 PM Roshan Harrington [R73.9] Hyperglycemia                  Roshan Harrington MD  11/01/24 1945

## 2024-11-02 LAB
ANION GAP SERPL CALCULATED.3IONS-SCNC: 7 MMOL/L (ref 4–13)
BUN SERPL-MCNC: 32 MG/DL (ref 5–25)
CALCIUM SERPL-MCNC: 8.8 MG/DL (ref 8.4–10.2)
CHLORIDE SERPL-SCNC: 108 MMOL/L (ref 96–108)
CO2 SERPL-SCNC: 21 MMOL/L (ref 21–32)
CREAT SERPL-MCNC: 2.07 MG/DL (ref 0.6–1.3)
GFR SERPL CREATININE-BSD FRML MDRD: 22 ML/MIN/1.73SQ M
GLUCOSE SERPL-MCNC: 116 MG/DL (ref 65–140)
GLUCOSE SERPL-MCNC: 195 MG/DL (ref 65–140)
GLUCOSE SERPL-MCNC: 223 MG/DL (ref 65–140)
GLUCOSE SERPL-MCNC: 298 MG/DL (ref 65–140)
GLUCOSE SERPL-MCNC: 348 MG/DL (ref 65–140)
GLUCOSE SERPL-MCNC: 352 MG/DL (ref 65–140)
PLATELET # BLD AUTO: 183 THOUSANDS/UL (ref 149–390)
PMV BLD AUTO: 11.5 FL (ref 8.9–12.7)
POTASSIUM SERPL-SCNC: 4.9 MMOL/L (ref 3.5–5.3)
SODIUM SERPL-SCNC: 136 MMOL/L (ref 135–147)

## 2024-11-02 PROCEDURE — 85049 AUTOMATED PLATELET COUNT: CPT | Performed by: PHYSICIAN ASSISTANT

## 2024-11-02 PROCEDURE — 87505 NFCT AGENT DETECTION GI: CPT | Performed by: PHYSICIAN ASSISTANT

## 2024-11-02 PROCEDURE — 80048 BASIC METABOLIC PNL TOTAL CA: CPT | Performed by: PHYSICIAN ASSISTANT

## 2024-11-02 PROCEDURE — 82948 REAGENT STRIP/BLOOD GLUCOSE: CPT

## 2024-11-02 PROCEDURE — 99232 SBSQ HOSP IP/OBS MODERATE 35: CPT | Performed by: STUDENT IN AN ORGANIZED HEALTH CARE EDUCATION/TRAINING PROGRAM

## 2024-11-02 RX ORDER — INSULIN GLARGINE 100 [IU]/ML
50 INJECTION, SOLUTION SUBCUTANEOUS EVERY MORNING
Status: DISCONTINUED | OUTPATIENT
Start: 2024-11-02 | End: 2024-11-04 | Stop reason: HOSPADM

## 2024-11-02 RX ADMIN — ALPRAZOLAM 0.5 MG: 0.5 TABLET ORAL at 21:29

## 2024-11-02 RX ADMIN — INSULIN LISPRO 4 UNITS: 100 INJECTION, SOLUTION INTRAVENOUS; SUBCUTANEOUS at 08:33

## 2024-11-02 RX ADMIN — INSULIN LISPRO 16 UNITS: 100 INJECTION, SOLUTION INTRAVENOUS; SUBCUTANEOUS at 11:49

## 2024-11-02 RX ADMIN — Medication 2000 UNITS: at 08:31

## 2024-11-02 RX ADMIN — LEVOTHYROXINE SODIUM 75 MCG: 75 TABLET ORAL at 05:41

## 2024-11-02 RX ADMIN — ATORVASTATIN CALCIUM 40 MG: 40 TABLET, FILM COATED ORAL at 16:55

## 2024-11-02 RX ADMIN — ACETAMINOPHEN 650 MG: 325 TABLET ORAL at 02:52

## 2024-11-02 RX ADMIN — INSULIN LISPRO 6 UNITS: 100 INJECTION, SOLUTION INTRAVENOUS; SUBCUTANEOUS at 11:49

## 2024-11-02 RX ADMIN — SODIUM CHLORIDE 75 ML/HR: 0.9 INJECTION, SOLUTION INTRAVENOUS at 21:29

## 2024-11-02 RX ADMIN — INSULIN GLARGINE 50 UNITS: 100 INJECTION, SOLUTION SUBCUTANEOUS at 08:31

## 2024-11-02 RX ADMIN — ISOSORBIDE MONONITRATE 60 MG: 60 TABLET, EXTENDED RELEASE ORAL at 08:31

## 2024-11-02 RX ADMIN — INSULIN LISPRO 2 UNITS: 100 INJECTION, SOLUTION INTRAVENOUS; SUBCUTANEOUS at 01:18

## 2024-11-02 RX ADMIN — HEPARIN SODIUM 5000 UNITS: 5000 INJECTION INTRAVENOUS; SUBCUTANEOUS at 21:25

## 2024-11-02 RX ADMIN — HEPARIN SODIUM 5000 UNITS: 5000 INJECTION INTRAVENOUS; SUBCUTANEOUS at 13:15

## 2024-11-02 RX ADMIN — INSULIN LISPRO 2 UNITS: 100 INJECTION, SOLUTION INTRAVENOUS; SUBCUTANEOUS at 16:55

## 2024-11-02 RX ADMIN — HEPARIN SODIUM 5000 UNITS: 5000 INJECTION INTRAVENOUS; SUBCUTANEOUS at 05:41

## 2024-11-02 RX ADMIN — ALPRAZOLAM 0.5 MG: 0.5 TABLET ORAL at 00:42

## 2024-11-02 RX ADMIN — SODIUM CHLORIDE 75 ML/HR: 0.9 INJECTION, SOLUTION INTRAVENOUS at 01:19

## 2024-11-02 RX ADMIN — HEPARIN SODIUM 5000 UNITS: 5000 INJECTION INTRAVENOUS; SUBCUTANEOUS at 00:42

## 2024-11-02 RX ADMIN — ATORVASTATIN CALCIUM 40 MG: 40 TABLET, FILM COATED ORAL at 00:42

## 2024-11-02 RX ADMIN — INSULIN LISPRO 22 UNITS: 100 INJECTION, SOLUTION INTRAVENOUS; SUBCUTANEOUS at 16:56

## 2024-11-02 RX ADMIN — INSULIN LISPRO 22 UNITS: 100 INJECTION, SOLUTION INTRAVENOUS; SUBCUTANEOUS at 08:33

## 2024-11-02 RX ADMIN — DILTIAZEM HYDROCHLORIDE 300 MG: 180 CAPSULE, COATED, EXTENDED RELEASE ORAL at 08:31

## 2024-11-02 NOTE — H&P
"H&P - Hospitalist   Name: Yajaira Marsh 78 y.o. female I MRN: [id number]  Unit/Bed#: W -01 I Date of Admission: 11/1/2024   Date of Service: 11/1/2024 I Hospital Day: 0     Assessment & Plan  Acute kidney injury superimposed on chronic kidney disease  (HCC)  Lab Results   Component Value Date    EGFR 16 11/01/2024    EGFR 29 (L) 04/22/2024    EGFR 32 11/14/2023    CREATININE 2.68 (H) 11/01/2024    CREATININE 1.77 (H) 04/22/2024    CREATININE 1.54 (H) 11/14/2023   POA with Cr 2.68.  Baseline Cr 1.5-1.7.   In the setting of reported diarrhea the past several weeks.   Received 1 L bolus of NS in the ED.   Continue IV fluids.   Avoid nephrotoxins and hypotension.   Hold ARB.  Monitor intake/ouptut.   Consider nephrology eval if not improving.   Repeat BMP in AM.   Diarrhea  Ongoing for the past several weeks, reports 1-2 episodes per day.   Obtain stool studies.   Diabetic polyneuropathy associated with type 2 diabetes mellitus (HCC)  Lab Results   Component Value Date    HGBA1C 10.8 (H) 08/21/2024       Recent Labs     11/01/24  1803 11/01/24  2130   POCGLU 369* 246*       Blood Sugar Average: Last 72 hrs:  (P) 307.5  Hyperglycemia on arrival with glucose 369; most recently improved to 246 after IV fluids.   Reports that her glucose was reading as \"high\" all day today on her new meter.   Outpatient insulin regimen is Novolog 22 units TID with meals which she reports compliance with and Toujeo 72 units daily but patient admits that she has not been taking this the past couple weeks.   Monitor accu-checks AC and HS.   SSI coverage.   Hypoglycemia protocol.  Hypertension  BP elevated on presentation; continue to monitor and avoid hypotension in the setting of KATIE.   Hold ARB.   Continue Imdur.   CAD (coronary artery disease)  S/p CABG in 2000.   Continue Imdur, statin.  MADAN (generalized anxiety disorder)  Prescribed alprazolam 0.5 mg BID prn (PDMP verified).  Continue Effexor as well.         VTE Pharmacologic " "Prophylaxis: VTE Score: 7 High Risk (Score >/= 5) - Pharmacological DVT Prophylaxis Ordered: heparin. Sequential Compression Devices Ordered.  Code Status: Level 1 - Full Code   Discussion with family: Patient declined call to .     Anticipated Length of Stay: Patient will be admitted on an observation basis with an anticipated length of stay of less than 2 midnights secondary to KATIE.    History of Present Illness   Chief Complaint: hyperglycemia    Yajaira Marsh is a 78 y.o. female with a PMH of type 2 DM, CAD s/p CABG, CKD, HTN, HLD, chronic pain, anxiety and depression who presents with hyperglycemia. Patient reports that she got a new glucose monitoring and all day today it was reading as \"high\" so she contacted her endocrinologist's office, who recommended she come to the hospital for evaluation. She reports compliance with her mealtime insulin but notes that she has not been taking her Toujeo 72 units daily as prescribed for the past couple weeks. She notes increased thirst lately but states that she has otherwise been \"ok.\" She also reports that for the past several weeks she has been having diarrhea which she states has improved overall but notes that she still has episodes of urgent diarrhea, 1-2 per day. She denies any recent fevers/ chills, chest pain, SOB, back pain, abdominal pain, n/v. She admits to some occasional dizziness which she reports is an ongoing issue for her.     Review of Systems   Constitutional:  Negative for appetite change, chills and fever.   Respiratory:  Negative for cough and shortness of breath.    Cardiovascular:  Negative for chest pain and leg swelling.   Gastrointestinal:  Positive for diarrhea. Negative for abdominal pain, blood in stool, nausea and vomiting.   Endocrine: Positive for polydipsia.   Genitourinary:  Negative for difficulty urinating and dysuria.   Neurological:  Positive for dizziness (occasional).   All other systems reviewed and are " negative.      Historical Information   Past Medical History:   Diagnosis Date    Acute embolism and thrombosis of unspecified deep veins of unspecified lower extremity (HCC)     Last Assessed:  5/18/17    Anemia     Anosmia     Anxiety     Arthritis     Asthma     Back pain     Bilateral macular retinal edema     CAD (coronary artery disease)     Cataract     Cervical disc herniation     Cervical radiculopathy     Cervical spinal stenosis     Cervical spondylolysis     Chronic kidney disease     Chronic mastoiditis     Colon polyp     Complex endometrial hyperplasia     Depression     Diabetes mellitus (HCC)     Disease of thyroid gland     DVT (deep venous thrombosis) (HCC)     DVT (deep venous thrombosis) (HCC) 06/16/2017    Fibromyalgia     Fibromyalgia, primary     Hyperlipidemia     Hypertension     Hypothyroid     Kidney stone     Lumbar radiculopathy     Neck pain     Obese     PONV (postoperative nausea and vomiting)     Shingles 07/01/2021    Spinal stenosis     Stomach ulcer     Thyroid disease     Vascular claudication ([facility]) 12/11/2017     Past Surgical History:   Procedure Laterality Date    BACK SURGERY      CARPAL TUNNEL RELEASE      CATARACT EXTRACTION      CHOLECYSTECTOMY      COLONOSCOPY      CORONARY ANGIOPLASTY      CORONARY ARTERY BYPASS GRAFT      CYSTOSCOPY N/A 6/20/2017    Procedure: CYSTOSCOPY;  Surgeon: Tacho Arnold MD;  Location: [facility];  Service: Gynecology Oncology    CYSTOSCOPY      WI LAPS TOTAL HYSTERECT 250 GM/< W/RMVL TUBE/OVARY N/A 6/20/2017    Procedure: ROBOTIC HYSTERECTOMY; BILATERAL SALPINO-OOPHERECTOMY; umbilical hernia repair.;  Surgeon: Tacho Arnold MD;  Location: [facility];  Service: Gynecology Oncology    WI REPAIR FIRST ABDOMINAL WALL HERNIA N/A 5/12/2022    Procedure: REPAIR HERNIA INCISIONAL;  Surgeon: Horacio Scherer DO;  Location: [facility];  Service: General    TONSILECTOMY AND ADNOIDECTOMY      TONSILLECTOMY      UMBILICAL HERNIA REPAIR    "    Social History     Tobacco Use    Smoking status: Former     Current packs/day: 0.00     Average packs/day: 1 pack/day for 6.0 years (6.0 ttl pk-yrs)     Types: Cigarettes     Start date:      Quit date:      Years since quittin.8     Passive exposure: Never    Smokeless tobacco: Never    Tobacco comments:     Smoked about a half a pack for about 4 yrs.  Quite about 50 yrs ago.   Vaping Use    Vaping status: Never Used   Substance and Sexual Activity    Alcohol use: Never    Drug use: No    Sexual activity: Not Currently     Comment: No known STD risk factors     E-Cigarette/Vaping    E-Cigarette Use Never User      E-Cigarette/Vaping Substances    Nicotine No     THC No     CBD No     Flavoring No     Other No     Unknown No        Social History:  Marital Status: /Civil Union   Occupation:   Patient Pre-hospital Living Situation: Home, With spouse  Patient Pre-hospital Level of Mobility: walks  Patient Pre-hospital Diet Restrictions:     Meds/Allergies   I have reviewed home medications with a medical source (PCP, Pharmacy, other).  Prior to Admission medications    Medication Sig Start Date End Date Taking? Authorizing Provider   ALPRAZolam (XANAX) 0.5 mg tablet Take 1 tablet (0.5 mg total) by mouth 2 (two) times a day as needed for anxiety 24   Sukumar Clemens,    aspirin 81 MG tablet Take 81 mg by mouth daily    Historical Provider, MD BOLTON UF III MINI PEN NEEDLES 31G X 5 MM MISC  20   Historical Provider, MD   BAQSIMI TWO PACK 3 MG/DOSE POWD Use as directed  10/8/19   Historical Provider, MD   BD Insulin Syringe U/F 31G X \" 0.5 ML MISC 4 (four) times a day As directed 22   Historical Provider, MD   cholecalciferol (VITAMIN D3) 1,000 units tablet Take 2,000 Units by mouth daily    Historical Provider, MD   Coenzyme Q10 (Co Q-10) 200 MG CAPS Take by mouth in the morning    Historical Provider, MD   Continuous Blood Gluc Sensor (Dexcom G6 Sensor) MISC Use 1 each " daily at bedtime as needed (scalp itching) 4/23/24   Sukumar Clemens DO   Continuous Blood Gluc Sensor (Dexcom G7 Sensor) Use 1 Device 12/5/23   Historical Provider, MD   diclofenac sodium (VOLTAREN) 1 % Apply 2 g topically 4 (four) times a day  Patient not taking: Reported on 10/16/2024 1/15/19   ALAN Rasmussen   diltiazem (TIAZAC) 300 MG 24 hr capsule Take 1 capsule (300 mg total) by mouth daily Tiddylt 8/19/24   Sukumar Clemens DO   Docusate Sodium (DSS) 100 MG CAPS Take 1 capsule by mouth every 12 (twelve) hours    Historical Provider, MD   erythromycin (ILOTYCIN) ophthalmic ointment Administer 0.5 inches to both eyes daily at bedtime  Patient not taking: Reported on 10/16/2024 9/30/24   Essie Woo MD   insulin aspart (NovoLOG FlexPen) 100 UNIT/ML injection pen PLEASE SEE ATTACHED FOR DETAILED DIRECTIONS 12/4/23   Historical Provider, MD   insulin aspart (NovoLOG FlexPen) 100 UNIT/ML injection pen Inject 18 Units under the skin 2 (two) times a day with meals    Historical Provider, MD   irbesartan (AVAPRO) 300 mg tablet Take 300 mg by mouth 10/10/22 9/5/24  Historical Provider, MD   isosorbide mononitrate (IMDUR) 60 mg 24 hr tablet  8/23/21   Historical Provider, MD   ketoconazole (NIZORAL) 2 % shampoo APPLY TOPICALLY 1 APPLICATION ONCE FOR 1 DOSE 8/22/24   Sukumar Clemens DO   levothyroxine 75 mcg tablet Take 75 mcg by mouth daily 11/7/23   Historical Provider, MD   nitroglycerin (NITROLINGUAL) 0.4 mg/spray spray  2/20/18   Historical Provider, MD   rosuvastatin (CRESTOR) 20 MG tablet Take 1 tablet (20 mg total) by mouth every evening 8/19/24   Sukumar Clemens DO   tobramycin-dexamethasone (TOBRADEX) ophthalmic suspension Administer 1 drop to the right eye every 4 (four) hours while awake  Patient not taking: Reported on 10/16/2024 10/1/24   Jamin Worrell MD   torsemide (DEMADEX) 20 mg tablet TAKE 0.5 TABLETS (10 MG TOTAL) BY MOUTH AS NEEDED (TAKE UP TO 2 TIMES PER WEEK FOR LEG  SWELLING AS NEEDED) 5/31/24   ALAN SerranooStar 300 units/mL CONCENTRATED U-300 injection pen (1-unit dial) Inject 62 Units under the skin in the morning 4/23/24   Sukumar Clemens DO   venlafaxine (EFFEXOR-XR) 150 mg 24 hr capsule Take 1 capsule (150 mg total) by mouth daily 10/16/24   Sukumar Clemens DO     Allergies   Allergen Reactions    Molds & Smuts Allergic Rhinitis    Other Allergic Rhinitis     RAGWEED, CAT DANDER, DOG DANDER     Pollen Extract Other (See Comments)     Cold symptoms         Objective :  Temp:  [97.9 °F (36.6 °C)-98.5 °F (36.9 °C)] 97.9 °F (36.6 °C)  HR:  [64-70] 64  BP: (162-178)/(71-75) 162/71  Resp:  [16-18] 16  SpO2:  [97 %-99 %] 97 %  O2 Device: None (Room air)    Physical Exam  Vitals and nursing note reviewed.   Constitutional:       General: She is not in acute distress.     Appearance: She is obese. She is not diaphoretic.   HENT:      Head: Normocephalic and atraumatic.   Eyes:      Conjunctiva/sclera: Conjunctivae normal.   Cardiovascular:      Rate and Rhythm: Normal rate and regular rhythm.   Pulmonary:      Effort: Pulmonary effort is normal. No respiratory distress.      Breath sounds: Normal breath sounds.   Abdominal:      General: Bowel sounds are normal.      Palpations: Abdomen is soft.      Tenderness: There is no abdominal tenderness.   Musculoskeletal:      Right lower leg: No edema.      Left lower leg: No edema.   Skin:     General: Skin is warm and dry.      Coloration: Skin is not pale.   Neurological:      Mental Status: She is alert and oriented to person, place, and time.   Psychiatric:         Mood and Affect: Mood normal.          Lines/Drains:            Lab Results: I have reviewed the following results:  Results from last 7 days   Lab Units 11/01/24  1911   WBC Thousand/uL 10.03   HEMOGLOBIN g/dL 11.5   HEMATOCRIT % 35.3   PLATELETS Thousands/uL 230   SEGS PCT % 64   LYMPHO PCT % 27   MONO PCT % 8   EOS PCT % 1     Results from  last 7 days   Lab Units 11/01/24  1911   SODIUM mmol/L 136   POTASSIUM mmol/L 4.3   CHLORIDE mmol/L 102   CO2 mmol/L 25   BUN mg/dL 38*   CREATININE mg/dL 2.68*   ANION GAP mmol/L 9   CALCIUM mg/dL 9.8   ALBUMIN g/dL 4.1   TOTAL BILIRUBIN mg/dL 0.47   ALK PHOS U/L 116*   ALT U/L 20   AST U/L 21   GLUCOSE RANDOM mg/dL 288*         Results from last 7 days   Lab Units 11/01/24  2130 11/01/24  1803   POC GLUCOSE mg/dl 246* 369*     Lab Results   Component Value Date    HGBA1C 10.8 (H) 08/21/2024    HGBA1C 11.9 (H) 04/22/2024    HGBA1C 11.8 (H) 11/06/2023           Imaging Results Review: No pertinent imaging studies reviewed.  Other Study Results Review: No additional pertinent studies reviewed.      ** Please Note: This note has been constructed using a voice recognition system. **

## 2024-11-02 NOTE — ASSESSMENT & PLAN NOTE
BP elevated on presentation; continue to monitor and avoid hypotension in the setting of KATIE.   Hold ARB.   Continue Imdur.

## 2024-11-02 NOTE — ASSESSMENT & PLAN NOTE
"Lab Results   Component Value Date    HGBA1C 10.8 (H) 08/21/2024       Recent Labs     11/01/24  1803 11/01/24  2130   POCGLU 369* 246*       Blood Sugar Average: Last 72 hrs:  (P) 307.5  Hyperglycemia on arrival with glucose 369; most recently improved to 246 after IV fluids.   Reports that her glucose was reading as \"high\" all day today on her new meter.   Outpatient insulin regimen is Novolog 22 units TID with meals which she reports compliance with and Toujeo 72 units daily but patient admits that she has not been taking this the past couple weeks.   Monitor accu-checks AC and HS.   SSI coverage.   Hypoglycemia protocol.  "

## 2024-11-02 NOTE — ASSESSMENT & PLAN NOTE
Lab Results   Component Value Date    EGFR 22 11/02/2024    EGFR 16 11/01/2024    EGFR 29 (L) 04/22/2024    CREATININE 2.07 (H) 11/02/2024    CREATININE 2.68 (H) 11/01/2024    CREATININE 1.77 (H) 04/22/2024   POA with Cr 2.68.  Baseline Cr 1.5-1.7.   In the setting of reported diarrhea the past several weeks.   Received 1 L bolus of NS in the ED.   Continue IV fluids.   Avoid nephrotoxins and hypotension.   Hold ARB.  Monitor intake/ouptut.   Kidney function improvement today, continue IV fluids for another 24 hours, repeat morning

## 2024-11-02 NOTE — UTILIZATION REVIEW
"Initial Clinical Review  OBSERVATION ADMISSION 11/1/2024 1944  CONVERTED TO  INPATIENT ADMISSION 11/2/2024 1015  DUE REQ > 2 MN FOR MANAGEMENT OF  KATIE ON CKD, DIARRHEA, DM2    Admission: Date/Time/Statement:   Admission Orders (From admission, onward)       Ordered        11/02/24 1015  INPATIENT ADMISSION  Once            11/01/24 1944  Place in Observation  Once                          Orders Placed This Encounter   Procedures    INPATIENT ADMISSION     Standing Status:   Standing     Number of Occurrences:   1     Order Specific Question:   Level of Care     Answer:   Med Surg [16]     Order Specific Question:   Estimated length of stay     Answer:   More than 2 Midnights     Order Specific Question:   Certification     Answer:   I certify that inpatient services are medically necessary for this patient for a duration of greater than two midnights. See H&P and MD Progress Notes for additional information about the patient's course of treatment.     ED Arrival Information       Expected   -    Arrival   11/1/2024 17:37    Acuity   Urgent              Means of arrival   Walk-In    Escorted by   Spouse    Service   Hospitalist    Admission type   Emergency              Arrival complaint   Medical Problem / Diarrhea / Headache             Chief Complaint   Patient presents with    Medical Problem     Pt reports she changed her blood sugar monitor yesterday morning and it only says high since changing it. Pt denies symptoms, taking insulin. BG in triage 369.        Initial Presentation: 78 y.o. female pmh type 2 DM, CAD s/p CABG, CKD, HTN, HLD, chronic pain, anxiety and depression  presents with hyperglycemia. Reports that she got a new glucose monitoring and all day today it was reading as \"high\" so she contacted her endocrinologist's office,  recommended she come to the hospital for evaluation. Reports compliance with mealtime insulin but notes that she has not been taking her Toujeo 72 units daily as prescribed " "for the past couple weeks. Endorses increased thirst lately but states that she has otherwise been \"ok.\" States that for the past several weeks she has been having diarrhea which she states has improved overall but notes that she still has episodes of urgent diarrhea, 1-2 per day. Denies any recent fevers/ chills, chest pain, SOB, back pain, abdominal pain, n/v.   Reports  occasional dizziness an ongoing issue for her.     EXAM  BP elevation, ABD distention;   Labs  KATIE (CR1.5-1.7.), glucose elevation & given   IV NSS 1 L .     Observation admission due to KATIE, diarrhea, hyperglycemia in setting DM2, hypertension  Cont IVF, repeat AM BMP, stool studies; SSI, hold ARB cont Imdur follow BPs  Anticipated Length of Stay/Certification Statement: Patient will be admitted on an observation basis with an anticipated length of stay of less than 2 midnights secondary to KATIE.  Date: 11/2/2024   Day 2: changed to Inpatient  kidney function improvement with appropriate IV fluids.   KATIE on CKD  Continue IV fluids. Obtain stool studies  Avoid nephrotoxins and hypotension.   Hold ARB. Monitor intake/ouptut.   continue IV fluids for another 24 hours, repeat morning labs  Date: 11/3/2024  Day 3: Has surpassed a 2nd midnight with active treatments and services.  Presents with  hyperglycemia, rec evaL by OP ENDO, diarrhea, KATIE  On exam ABD distention, trace LE edema  Diagnosis/Plan     KATIE on CKD  Cont IVF, hold ARB, follow BP, I/O, CR improving today cont IVF another 24 HR & repeat CR in AM; PRN Labetalol for SBP > 160  Diarrhea  Neg stool panel,  consider stool lytes to determine stool anion gap if persistent/acute hospital issue   DM2  Poor baseline diet & given Diabetic education/ classes in past but hesitant to make dietary changes; drinks diet soda  Continue 50 units glargine every morning  Currently on 16/22/22 units lispro 3 times daily AC scheduled  Due to acute decrease in blood sugars on 11/3, will continue regimen as is " without any major changes.  Suspect that this is largely in part due to carb controlled diet while hospitalized  Anticipate that patient will need up titration of her insulin regimen after discharge with endocrine with close follow-up  Monitor accu-checks AC and HS.   SSI coverage. Algo 3  Hypoglycemia protocol.  ED Treatment-Medication Administration from 11/01/2024 1737 to 11/01/2024 2052         Date/Time Order Dose Route Action     11/01/2024 1911 sodium chloride 0.9 % bolus 1,000 mL 1,000 mL Intravenous New Bag            Scheduled Medications:  atorvastatin, 40 mg, Oral, Daily With Dinner  cholecalciferol, 2,000 Units, Oral, Daily  diltiazem, 300 mg, Oral, Daily  heparin (porcine), 5,000 Units, Subcutaneous, Q8H [place]  insulin glargine, 50 Units, Subcutaneous, QAM  insulin lispro, 1-5 Units, Subcutaneous, HS  insulin lispro, 1-6 Units, Subcutaneous, TID AC  insulin lispro, 16 Units, Subcutaneous, Daily With Lunch  insulin lispro, 22 Units, Subcutaneous, Daily With Breakfast  insulin lispro, 22 Units, Subcutaneous, Daily With Dinner  isosorbide mononitrate, 60 mg, Oral, Daily  levothyroxine, 75 mcg, Oral, Early Morning      Continuous IV Infusions:  sodium chloride, 75 mL/hr, Intravenous, Continuous      PRN Meds:  acetaminophen, 650 mg, Oral, Q6H PRN  ALPRAZolam, 0.5 mg, Oral, BID PRN      ED Triage Vitals   Temperature Pulse Respirations Blood Pressure SpO2 Pain Score   11/01/24 1756 11/01/24 1756 11/01/24 1756 11/01/24 1756 11/01/24 1756 11/01/24 2100   98.5 °F (36.9 °C) 70 18 (!) 178/75 99 % No Pain     Weight (last 2 days)       Date/Time Weight    11/03/24 0600 86.6 (191)    11/02/24 0600 90.3 (199.08)    11/01/24 2306 90.3 (199.08)    11/01/24 2104 90.3 (199.08)            Vital Signs (last 3 days)       Date/Time Temp Pulse Resp BP MAP (mmHg) SpO2 O2 Device Patient Position - Orthostatic VS Pain    11/03/24 07:25:49 97.7 °F (36.5 °C) 78 -- 174/76 109 99 % -- -- --    11/03/24 0724 -- -- -- -- -- -- None  (Room air) -- No Pain    11/03/24 0144 -- -- -- -- -- -- -- -- No Pain    11/03/24 0100 -- -- -- -- -- -- -- -- No Pain    11/02/24 2248 -- -- -- -- -- -- -- Lying --    11/02/24 22:41:53 98.8 °F (37.1 °C) 75 12 179/73 108 98 % -- -- --    11/02/24 15:21:17 98.1 °F (36.7 °C) 60 16 153/60 91 99 % -- -- --    11/02/24 0836 -- -- -- -- -- -- None (Room air) -- No Pain    11/02/24 07:37:39 98 °F (36.7 °C) 72 18 144/57 86 96 % -- -- --    11/02/24 0252 -- -- -- -- -- -- -- -- 3    11/01/24 23:50:28 99 °F (37.2 °C) 74 18 154/60 91 98 % -- -- --    11/01/24 2104 -- -- -- -- -- -- -- -- No Pain    11/01/24 2100 -- -- -- -- -- -- -- -- No Pain    11/01/24 20:59:17 97.9 °F (36.6 °C) 64 16 162/71 101 97 % -- -- --    11/01/24 1756 98.5 °F (36.9 °C) 70 18 178/75 108 99 % None (Room air) Sitting --              Pertinent Labs/Diagnostic Test Results:   Radiology:  No orders to display     Cardiology:  No orders to display     GI:  No orders to display           Results from last 7 days   Lab Units 11/02/24 0448 11/01/24 1911   WBC Thousand/uL  --  10.03   HEMOGLOBIN g/dL  --  11.5   HEMATOCRIT %  --  35.3   PLATELETS Thousands/uL 183 230   TOTAL NEUT ABS Thousands/µL  --  6.34         Results from last 7 days   Lab Units 11/03/24  0544 11/02/24  0448 11/01/24  1911   SODIUM mmol/L 140 136 136   POTASSIUM mmol/L 4.7 4.9 4.3   CHLORIDE mmol/L 111* 108 102   CO2 mmol/L 20* 21 25   ANION GAP mmol/L 9 7 9   BUN mg/dL 21 32* 38*   CREATININE mg/dL 1.60* 2.07* 2.68*   EGFR ml/min/1.73sq m 30 22 16   CALCIUM mg/dL 8.9 8.8 9.8     Results from last 7 days   Lab Units 11/03/24  0544 11/01/24  1911   AST U/L 21 21   ALT U/L 15 20   ALK PHOS U/L 92 116*   TOTAL PROTEIN g/dL 6.5 7.9   ALBUMIN g/dL 3.4* 4.1   TOTAL BILIRUBIN mg/dL 0.46 0.47     Results from last 7 days   Lab Units 11/03/24  1129 11/03/24  0724 11/02/24  2108 11/02/24  1735 11/02/24  1623 11/02/24  1110 11/02/24  0737 11/01/24  2130 11/01/24  1803   POC GLUCOSE mg/dl 226*  "214* 116 195* 223* 352* 298* 246* 369*     Results from last 7 days   Lab Units 11/03/24  0544 11/02/24  0448 11/01/24  1911   GLUCOSE RANDOM mg/dL 179* 348* 288*             No results found for: \"BETA-HYDROXYBUTYRATE\"       Results from last 7 days   Lab Units 11/01/24  1911   PH BERNADETTE  7.407*   PCO2 BERNADETTE mm Hg 37.0*   PO2 BERNADETTE mm Hg 44.2   HCO3 BERNADETTE mmol/L 22.8*   BASE EXC BERNADETTE mmol/L -1.5   O2 CONTENT BERNADETTE ml/dL 14.0   O2 HGB, VENOUS % 79.7                                                                                 Results from last 7 days   Lab Units 11/01/24  1905   CLARITY UA  Clear   COLOR UA  Light Yellow   SPEC GRAV UA  1.016   PH UA  5.0   GLUCOSE UA mg/dl >=1000 (1%)*   KETONES UA mg/dl Negative   BLOOD UA  Trace*   PROTEIN UA mg/dl Trace*   NITRITE UA  Negative   BILIRUBIN UA  Negative   UROBILINOGEN UA (BE) mg/dl <2.0   LEUKOCYTES UA  Small*   WBC UA /hpf 4-10*   RBC UA /hpf 1-2   BACTERIA UA /hpf Occasional   EPITHELIAL CELLS WET PREP /hpf Occasional                         Results from last 7 days   Lab Units 11/02/24  1412   SALMONELLA SP PCR  Negative   SHIGELLA SP/ENTEROINVASIVE E. COLI (EIEC)  Negative   CAMPYLOBACTER SP (JEJUNI AND COLI)  Negative   SHIGA TOXIN 1/SHIGA TOXIN 2  Negative                           Past Medical History:   Diagnosis Date    Acute embolism and thrombosis of unspecified deep veins of unspecified lower extremity (HCC)     Last Assessed:  5/18/17    Anemia     Anosmia     Anxiety     Arthritis     Asthma     Back pain     Bilateral macular retinal edema     CAD (coronary artery disease)     Cataract     Cervical disc herniation     Cervical radiculopathy     Cervical spinal stenosis     Cervical spondylolysis     Chronic kidney disease     Chronic mastoiditis     Colon polyp     Complex endometrial hyperplasia     Depression     Diabetes mellitus (HCC)     Disease of thyroid gland     DVT (deep venous thrombosis) ([facility])     DVT (deep venous thrombosis) ([facility]) 06/16/2017    " Fibromyalgia     Fibromyalgia, primary     Hyperlipidemia     Hypertension     Hypothyroid     Kidney stone     Lumbar radiculopathy     Neck pain     Obese     PONV (postoperative nausea and vomiting)     Shingles 07/01/2021    Spinal stenosis     Stomach ulcer     Thyroid disease     Vascular claudication (HCC) 12/11/2017     Present on Admission:   Hypertension   CAD (coronary artery disease)   Diabetic polyneuropathy associated with type 2 diabetes mellitus (HCC)   Diarrhea   MADAN (generalized anxiety disorder)      Admitting Diagnosis: Hyperglycemia [R73.9]  KATIE (acute kidney injury) (HCC) [N17.9]  Evaluation by medical service required [Z00.00]  Age/Sex: 78 y.o. female    Network Utilization Review Department  ATTENTION: Please call with any questions or concerns to [phone] and carefully listen to the prompts so that you are directed to the right person. All voicemails are confidential.   For Discharge needs, contact Care Management DC Support Team at [phone] opt. 2  Send all requests for admission clinical reviews, approved or denied determinations and any other requests to dedicated fax number below belonging to the [place] where the patient is receiving treatment. List of dedicated fax numbers for the Facilities:  FACILITY NAME UR FAX NUMBER   ADMISSION DENIALS (Administrative/Medical Necessity) [phone]   DISCHARGE SUPPORT TEAM (NETWORK) [phone]   PARENT CHILD HEALTH (Maternity/NICU/Pediatrics) [phone]   [facility] [phone]   [facility] [phone]   [facility] [phone]   [facility] [phone]   [facility] [phone]   [facility] [phone]   [facility] [phone]   [facility] [phone]   [facility] [phone]    [facility] [phone]   [facility] [phone]   [facility] [phone]

## 2024-11-02 NOTE — ED ATTENDING ATTESTATION
11/1/2024  IShanna MD, saw and evaluated the patient. I have discussed the patient with the resident/non-physician practitioner and agree with the resident's/non-physician practitioner's findings, Plan of Care, and MDM as documented in the resident's/non-physician practitioner's note, except where noted. All available labs and Radiology studies were reviewed.  I was present for key portions of any procedure(s) performed by the resident/non-physician practitioner and I was immediately available to provide assistance.       At this point I agree with the current assessment done in the Emergency Department.  I have conducted an independent evaluation of this patient a history and physical is as follows:    78-year-old female presenting to the ER due to elevated blood sugars at home, polyuria and polydipsia.  No chest pain or trouble breathing.  No lightheadedness or dizziness.  No fevers or chills.    Patient with hyperglycemia, will evaluate for signs of DKA, signs of KATIE.    Patient with KATIE.  Will need admission to hospital.        ED Course         Critical Care Time  Procedures

## 2024-11-02 NOTE — ASSESSMENT & PLAN NOTE
Lab Results   Component Value Date    EGFR 16 11/01/2024    EGFR 29 (L) 04/22/2024    EGFR 32 11/14/2023    CREATININE 2.68 (H) 11/01/2024    CREATININE 1.77 (H) 04/22/2024    CREATININE 1.54 (H) 11/14/2023   POA with Cr 2.68.  Baseline Cr 1.5-1.7.   In the setting of reported diarrhea the past several weeks.   Received 1 L bolus of NS in the ED.   Continue IV fluids.   Avoid nephrotoxins and hypotension.   Hold ARB.  Monitor intake/ouptut.   Consider nephrology eval if not improving.   Repeat BMP in AM.
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
## 2024-11-02 NOTE — ASSESSMENT & PLAN NOTE
"Lab Results   Component Value Date    HGBA1C 10.8 (H) 08/21/2024       Recent Labs     11/01/24  1803 11/01/24  2130 11/02/24  0737   POCGLU 369* 246* 298*       Blood Sugar Average: Last 72 hrs:  (P) 304.4784721183663378  Hyperglycemia on arrival with glucose 369; most recently improved to 246 after IV fluids.   Reports that her glucose was reading as \"high\" all day today on her new meter.   Outpatient insulin regimen is Novolog 22 units TID with meals which she reports compliance with and Toujeo 72 units daily but patient admits that she has not been taking this the past couple weeks.   Monitor accu-checks AC and HS.   SSI coverage.   Hypoglycemia protocol.  "

## 2024-11-02 NOTE — PROGRESS NOTES
"Progress Note - Hospitalist   Name: Yajaira Marsh 78 y.o. female I MRN: [id number]  Unit/Bed#: W -01 I Date of Admission: 11/1/2024   Date of Service: 11/2/2024 I Hospital Day: 0     Assessment & Plan  Acute kidney injury superimposed on chronic kidney disease  (HCC)  Lab Results   Component Value Date    EGFR 22 11/02/2024    EGFR 16 11/01/2024    EGFR 29 (L) 04/22/2024    CREATININE 2.07 (H) 11/02/2024    CREATININE 2.68 (H) 11/01/2024    CREATININE 1.77 (H) 04/22/2024   POA with Cr 2.68.  Baseline Cr 1.5-1.7.   In the setting of reported diarrhea the past several weeks.   Received 1 L bolus of NS in the ED.   Continue IV fluids.   Avoid nephrotoxins and hypotension.   Hold ARB.  Monitor intake/ouptut.   Kidney function improvement today, continue IV fluids for another 24 hours, repeat morning  Diarrhea  Ongoing for the past several weeks, reports 1-2 episodes per day.   Obtain stool studies.   Diabetic polyneuropathy associated with type 2 diabetes mellitus (HCC)  Lab Results   Component Value Date    HGBA1C 10.8 (H) 08/21/2024       Recent Labs     11/01/24  1803 11/01/24  2130 11/02/24  0737   POCGLU 369* 246* 298*       Blood Sugar Average: Last 72 hrs:  (P) 304.5414415866081650  Hyperglycemia on arrival with glucose 369; most recently improved to 246 after IV fluids.   Reports that her glucose was reading as \"high\" all day today on her new meter.   Outpatient insulin regimen is Novolog 22 units TID with meals which she reports compliance with and Toujeo 72 units daily but patient admits that she has not been taking this the past couple weeks.   Monitor accu-checks AC and HS.   SSI coverage.   Hypoglycemia protocol.  Hypertension  BP elevated on presentation; continue to monitor and avoid hypotension in the setting of KATIE.   Hold ARB.   Continue Imdur.   CAD (coronary artery disease)  S/p CABG in 2000.   Continue Imdur, Cardizem, Statin.  MADAN (generalized anxiety disorder)  Prescribed alprazolam 0.5 " mg BID prn (PDMP verified).  Continue Effexor as well.       VTE Pharmacologic Prophylaxis: VTE Score: 7 High Risk (Score >/= 5) - Pharmacological DVT Prophylaxis Ordered: heparin. Sequential Compression Devices Ordered.    Mobility:   Basic Mobility Inpatient Raw Score: 15  JH-HLM Goal: 4: Move to chair/commode  JH-HLM Achieved: 4: Move to chair/commode  JH-HLM Goal achieved. Continue to encourage appropriate mobility.    Patient Centered Rounds: I performed bedside rounds with nursing staff today.   Discussions with Specialists or Other Care Team Provider: N/A    Education and Discussions with Family / Patient: Discussed with patient at bedside    Current Length of Stay: 0 day(s)  Current Patient Status: Inpatient   Certification Statement: The patient will continue to require additional inpatient hospital stay due to KATIE on CKD  Discharge Plan: Anticipate discharge in 24-48 hrs to home.    Code Status: Level 1 - Full Code    Subjective   Patient seen and examined at bedside this morning, overall feeling well without acute concerns, continue medications as prescribed at this time, kidney function improvement with appropriate IV fluids.    Objective :  Temp:  [97.9 °F (36.6 °C)-99 °F (37.2 °C)] 98 °F (36.7 °C)  HR:  [64-74] 72  BP: (144-178)/(57-75) 144/57  Resp:  [16-18] 18  SpO2:  [96 %-99 %] 96 %  O2 Device: None (Room air)    Body mass index is 37.62 kg/m².     Input and Output Summary (last 24 hours):     Intake/Output Summary (Last 24 hours) at 11/2/2024 1017  Last data filed at 11/1/2024 2245  Gross per 24 hour   Intake 480 ml   Output --   Net 480 ml       Physical Exam  Vitals and nursing note reviewed.   Constitutional:       General: She is not in acute distress.     Appearance: She is well-developed.   HENT:      Head: Normocephalic and atraumatic.   Eyes:      General: No scleral icterus.     Conjunctiva/sclera: Conjunctivae normal.   Cardiovascular:      Rate and Rhythm: Normal rate and regular  rhythm.      Pulses: Normal pulses.      Heart sounds: No murmur heard.  Pulmonary:      Effort: Pulmonary effort is normal. No respiratory distress.      Breath sounds: Normal breath sounds.   Abdominal:      General: Bowel sounds are normal. There is no distension.      Palpations: Abdomen is soft.      Tenderness: There is no abdominal tenderness.   Musculoskeletal:         General: No swelling. Normal range of motion.      Cervical back: Neck supple.   Skin:     General: Skin is warm and dry.      Capillary Refill: Capillary refill takes less than 2 seconds.   Neurological:      General: No focal deficit present.      Mental Status: She is alert and oriented to person, place, and time. Mental status is at baseline.   Psychiatric:         Mood and Affect: Mood normal.         Behavior: Behavior normal.           Lines/Drains:              Lab Results: I have reviewed the following results:   Results from last 7 days   Lab Units 11/02/24  0448 11/01/24  1911   WBC Thousand/uL  --  10.03   HEMOGLOBIN g/dL  --  11.5   HEMATOCRIT %  --  35.3   PLATELETS Thousands/uL 183 230   SEGS PCT %  --  64   LYMPHO PCT %  --  27   MONO PCT %  --  8   EOS PCT %  --  1     Results from last 7 days   Lab Units 11/02/24  0448 11/01/24  1911   SODIUM mmol/L 136 136   POTASSIUM mmol/L 4.9 4.3   CHLORIDE mmol/L 108 102   CO2 mmol/L 21 25   BUN mg/dL 32* 38*   CREATININE mg/dL 2.07* 2.68*   ANION GAP mmol/L 7 9   CALCIUM mg/dL 8.8 9.8   ALBUMIN g/dL  --  4.1   TOTAL BILIRUBIN mg/dL  --  0.47   ALK PHOS U/L  --  116*   ALT U/L  --  20   AST U/L  --  21   GLUCOSE RANDOM mg/dL 348* 288*         Results from last 7 days   Lab Units 11/02/24  0737 11/01/24  2130 11/01/24  1803   POC GLUCOSE mg/dl 298* 246* 369*               Recent Cultures (last 7 days):           Last 24 Hours Medication List:     Current Facility-Administered Medications:     acetaminophen (TYLENOL) tablet 650 mg, Q6H PRN    ALPRAZolam (XANAX) tablet 0.5 mg, BID PRN     atorvastatin (LIPITOR) tablet 40 mg, Daily With Dinner    Cholecalciferol (VITAMIN D3) tablet 2,000 Units, Daily    diltiazem (CARDIZEM CD) 24 hr capsule 300 mg, Daily    heparin (porcine) subcutaneous injection 5,000 Units, Q8H [place] **AND** [COMPLETED] Platelet count, Once    insulin glargine (LANTUS) subcutaneous injection 50 Units 0.5 mL, QAM    insulin lispro (HumALOG/ADMELOG) 100 units/mL subcutaneous injection 1-5 Units, HS    insulin lispro (HumALOG/ADMELOG) 100 units/mL subcutaneous injection 1-6 Units, TID AC **AND** Fingerstick Glucose (POCT), TID AC    insulin lispro (HumALOG/ADMELOG) 100 units/mL subcutaneous injection 16 Units, Daily With Lunch    insulin lispro (HumALOG/ADMELOG) 100 units/mL subcutaneous injection 22 Units, Daily With Breakfast    insulin lispro (HumALOG/ADMELOG) 100 units/mL subcutaneous injection 22 Units, Daily With Dinner    isosorbide mononitrate (IMDUR) 24 hr tablet 60 mg, Daily    levothyroxine tablet 75 mcg, Early Morning    sodium chloride 0.9 % infusion, Continuous, Last Rate: 75 mL/hr (11/02/24 0119)    Administrative Statements   Today, Patient Was Seen By: Saad Leal DO      **Please Note: This note may have been constructed using a voice recognition system.**

## 2024-11-03 LAB
ALBUMIN SERPL BCG-MCNC: 3.4 G/DL (ref 3.5–5)
ALP SERPL-CCNC: 92 U/L (ref 34–104)
ALT SERPL W P-5'-P-CCNC: 15 U/L (ref 7–52)
ANION GAP SERPL CALCULATED.3IONS-SCNC: 9 MMOL/L (ref 4–13)
AST SERPL W P-5'-P-CCNC: 21 U/L (ref 13–39)
BILIRUB SERPL-MCNC: 0.46 MG/DL (ref 0.2–1)
BUN SERPL-MCNC: 21 MG/DL (ref 5–25)
C COLI+JEJUNI TUF STL QL NAA+PROBE: NEGATIVE
CALCIUM ALBUM COR SERPL-MCNC: 9.4 MG/DL (ref 8.3–10.1)
CALCIUM SERPL-MCNC: 8.9 MG/DL (ref 8.4–10.2)
CHLORIDE SERPL-SCNC: 111 MMOL/L (ref 96–108)
CO2 SERPL-SCNC: 20 MMOL/L (ref 21–32)
CREAT SERPL-MCNC: 1.6 MG/DL (ref 0.6–1.3)
EC STX1+STX2 GENES STL QL NAA+PROBE: NEGATIVE
GFR SERPL CREATININE-BSD FRML MDRD: 30 ML/MIN/1.73SQ M
GLUCOSE SERPL-MCNC: 109 MG/DL (ref 65–140)
GLUCOSE SERPL-MCNC: 111 MG/DL (ref 65–140)
GLUCOSE SERPL-MCNC: 179 MG/DL (ref 65–140)
GLUCOSE SERPL-MCNC: 214 MG/DL (ref 65–140)
GLUCOSE SERPL-MCNC: 226 MG/DL (ref 65–140)
POTASSIUM SERPL-SCNC: 4.7 MMOL/L (ref 3.5–5.3)
PROT SERPL-MCNC: 6.5 G/DL (ref 6.4–8.4)
SALMONELLA SP SPAO STL QL NAA+PROBE: NEGATIVE
SHIGELLA SP+EIEC IPAH STL QL NAA+PROBE: NEGATIVE
SODIUM SERPL-SCNC: 140 MMOL/L (ref 135–147)

## 2024-11-03 PROCEDURE — 80053 COMPREHEN METABOLIC PANEL: CPT | Performed by: STUDENT IN AN ORGANIZED HEALTH CARE EDUCATION/TRAINING PROGRAM

## 2024-11-03 PROCEDURE — 82948 REAGENT STRIP/BLOOD GLUCOSE: CPT

## 2024-11-03 PROCEDURE — 99233 SBSQ HOSP IP/OBS HIGH 50: CPT

## 2024-11-03 RX ORDER — SIMETHICONE 80 MG
80 TABLET,CHEWABLE ORAL EVERY 6 HOURS PRN
Status: DISCONTINUED | OUTPATIENT
Start: 2024-11-03 | End: 2024-11-04 | Stop reason: HOSPADM

## 2024-11-03 RX ORDER — VENLAFAXINE 37.5 MG/1
75 TABLET ORAL DAILY
Status: DISCONTINUED | OUTPATIENT
Start: 2024-11-03 | End: 2024-11-04 | Stop reason: HOSPADM

## 2024-11-03 RX ORDER — LABETALOL HYDROCHLORIDE 5 MG/ML
10 INJECTION, SOLUTION INTRAVENOUS EVERY 4 HOURS PRN
Status: DISCONTINUED | OUTPATIENT
Start: 2024-11-03 | End: 2024-11-04 | Stop reason: HOSPADM

## 2024-11-03 RX ORDER — LORAZEPAM 2 MG/ML
0.5 INJECTION INTRAMUSCULAR ONCE
Status: COMPLETED | OUTPATIENT
Start: 2024-11-03 | End: 2024-11-03

## 2024-11-03 RX ORDER — LANOLIN ALCOHOL/MO/W.PET/CERES
3 CREAM (GRAM) TOPICAL
Status: DISCONTINUED | OUTPATIENT
Start: 2024-11-03 | End: 2024-11-04 | Stop reason: HOSPADM

## 2024-11-03 RX ADMIN — SODIUM CHLORIDE 75 ML/HR: 0.9 INJECTION, SOLUTION INTRAVENOUS at 21:30

## 2024-11-03 RX ADMIN — ALPRAZOLAM 0.5 MG: 0.5 TABLET ORAL at 01:44

## 2024-11-03 RX ADMIN — HEPARIN SODIUM 5000 UNITS: 5000 INJECTION INTRAVENOUS; SUBCUTANEOUS at 21:29

## 2024-11-03 RX ADMIN — DILTIAZEM HYDROCHLORIDE 300 MG: 180 CAPSULE, COATED, EXTENDED RELEASE ORAL at 08:00

## 2024-11-03 RX ADMIN — INSULIN LISPRO 2 UNITS: 100 INJECTION, SOLUTION INTRAVENOUS; SUBCUTANEOUS at 07:31

## 2024-11-03 RX ADMIN — HEPARIN SODIUM 5000 UNITS: 5000 INJECTION INTRAVENOUS; SUBCUTANEOUS at 16:10

## 2024-11-03 RX ADMIN — Medication 2000 UNITS: at 08:00

## 2024-11-03 RX ADMIN — Medication 3 MG: at 21:29

## 2024-11-03 RX ADMIN — ACETAMINOPHEN 650 MG: 325 TABLET ORAL at 01:44

## 2024-11-03 RX ADMIN — ISOSORBIDE MONONITRATE 60 MG: 60 TABLET, EXTENDED RELEASE ORAL at 08:00

## 2024-11-03 RX ADMIN — INSULIN LISPRO 22 UNITS: 100 INJECTION, SOLUTION INTRAVENOUS; SUBCUTANEOUS at 07:31

## 2024-11-03 RX ADMIN — INSULIN LISPRO 16 UNITS: 100 INJECTION, SOLUTION INTRAVENOUS; SUBCUTANEOUS at 12:25

## 2024-11-03 RX ADMIN — INSULIN LISPRO 22 UNITS: 100 INJECTION, SOLUTION INTRAVENOUS; SUBCUTANEOUS at 16:22

## 2024-11-03 RX ADMIN — VENLAFAXINE 75 MG: 37.5 TABLET ORAL at 16:58

## 2024-11-03 RX ADMIN — LABETALOL HYDROCHLORIDE 10 MG: 5 INJECTION, SOLUTION INTRAVENOUS at 16:23

## 2024-11-03 RX ADMIN — LEVOTHYROXINE SODIUM 75 MCG: 75 TABLET ORAL at 05:43

## 2024-11-03 RX ADMIN — HEPARIN SODIUM 5000 UNITS: 5000 INJECTION INTRAVENOUS; SUBCUTANEOUS at 05:43

## 2024-11-03 RX ADMIN — ACETAMINOPHEN 650 MG: 325 TABLET ORAL at 01:19

## 2024-11-03 RX ADMIN — LORAZEPAM 0.5 MG: 2 INJECTION INTRAMUSCULAR; INTRAVENOUS at 01:19

## 2024-11-03 RX ADMIN — INSULIN LISPRO 2 UNITS: 100 INJECTION, SOLUTION INTRAVENOUS; SUBCUTANEOUS at 12:24

## 2024-11-03 RX ADMIN — ATORVASTATIN CALCIUM 40 MG: 40 TABLET, FILM COATED ORAL at 16:10

## 2024-11-03 RX ADMIN — INSULIN GLARGINE 50 UNITS: 100 INJECTION, SOLUTION SUBCUTANEOUS at 08:00

## 2024-11-03 NOTE — PLAN OF CARE
Problem: PAIN - ADULT  Goal: Verbalizes/displays adequate comfort level or baseline comfort level  Description: Interventions:  - Encourage patient to monitor pain and request assistance  - Assess pain using appropriate pain scale  - Administer analgesics based on type and severity of pain and evaluate response  - Implement non-pharmacological measures as appropriate and evaluate response  - Consider cultural and social influences on pain and pain management  - Notify physician/advanced practitioner if interventions unsuccessful or patient reports new pain  Outcome: Progressing     Problem: INFECTION - ADULT  Goal: Absence or prevention of progression during hospitalization  Description: INTERVENTIONS:  - Assess and monitor for signs and symptoms of infection  - Monitor lab/diagnostic results  - Monitor all insertion sites, i.e. indwelling lines, tubes, and drains  - Monitor endotracheal if appropriate and nasal secretions for changes in amount and color  - Gary appropriate cooling/warming therapies per order  - Administer medications as ordered  - Instruct and encourage patient and family to use good hand hygiene technique  - Identify and instruct in appropriate isolation precautions for identified infection/condition  Outcome: Progressing  Goal: Absence of fever/infection during neutropenic period  Description: INTERVENTIONS:  - Monitor WBC    Outcome: Progressing     Problem: SAFETY ADULT  Goal: Patient will remain free of falls  Description: INTERVENTIONS:  - Educate patient/family on patient safety including physical limitations  - Instruct patient to call for assistance with activity   - Consult OT/PT to assist with strengthening/mobility   - Keep Call bell within reach  - Keep bed low and locked with side rails adjusted as appropriate  - Keep care items and personal belongings within reach  - Initiate and maintain comfort rounds  - Make Fall Risk Sign visible to staff  - Offer Toileting every  Hours,  in advance of need  - Initiate/Maintain alarm  - Obtain necessary fall risk management equipment:   - Apply yellow socks and bracelet for high fall risk patients  - Consider moving patient to room near nurses station  Outcome: Progressing  Goal: Maintain or return to baseline ADL function  Description: INTERVENTIONS:  -  Assess patient's ability to carry out ADLs; assess patient's baseline for ADL function and identify physical deficits which impact ability to perform ADLs (bathing, care of mouth/teeth, toileting, grooming, dressing, etc.)  - Assess/evaluate cause of self-care deficits   - Assess range of motion  - Assess patient's mobility; develop plan if impaired  - Assess patient's need for assistive devices and provide as appropriate  - Encourage maximum independence but intervene and supervise when necessary  - Involve family in performance of ADLs  - Assess for home care needs following discharge   - Consider OT consult to assist with ADL evaluation and planning for discharge  - Provide patient education as appropriate  Outcome: Progressing  Goal: Maintains/Returns to pre admission functional level  Description: INTERVENTIONS:  - Perform AM-PAC 6 Click Basic Mobility/ Daily Activity assessment daily.  - Set and communicate daily mobility goal to care team and patient/family/caregiver.   - Collaborate with rehabilitation services on mobility goals if consulted  - Perform Range of Motion  times a day.  - Reposition patient every  hours.  - Dangle patient  times a day  - Stand patient  times a day  - Ambulate patient  times a day  - Out of bed to chair  times a day   - Out of bed for meals  times a day  - Out of bed for toileting  - Record patient progress and toleration of activity level   Outcome: Progressing     Problem: DISCHARGE PLANNING  Goal: Discharge to home or other facility with appropriate resources  Description: INTERVENTIONS:  - Identify barriers to discharge w/patient and caregiver  - Arrange for  needed discharge resources and transportation as appropriate  - Identify discharge learning needs (meds, wound care, etc.)  - Arrange for interpretive services to assist at discharge as needed  - Refer to Case Management Department for coordinating discharge planning if the patient needs post-hospital services based on physician/advanced practitioner order or complex needs related to functional status, cognitive ability, or social support system  Outcome: Progressing

## 2024-11-03 NOTE — ASSESSMENT & PLAN NOTE
Ongoing for the past several weeks, reports 1-2 episodes per day.   Stool panel negative  Denies any floating lipids in stool or foul smell to suggest pancreatic insufficiency  Can consider stool lytes to determine stool anion gap if persistent/acute hospital issue

## 2024-11-03 NOTE — ASSESSMENT & PLAN NOTE
Lab Results   Component Value Date    EGFR 30 11/03/2024    EGFR 22 11/02/2024    EGFR 16 11/01/2024    CREATININE 1.60 (H) 11/03/2024    CREATININE 2.07 (H) 11/02/2024    CREATININE 2.68 (H) 11/01/2024   POA with Cr 2.68.  Baseline Cr 1.5-1.7.   In the setting of reported diarrhea the past several weeks.   Received 1 L bolus of NS in the ED.   Continue IV fluids.   Avoid nephrotoxins and hypotension.   Hold ARB.  Monitor intake/ouptut.   Kidney function improvement today, continue IV fluids for another 24 hours, repeat morning  Added as needed labetalol for SBP greater than 160 given the patient is on 2 high dose orals and unable to resume ACE due to KATIE

## 2024-11-03 NOTE — ASSESSMENT & PLAN NOTE
BP elevated on presentation; continue to monitor and avoid hypotension in the setting of KATIE.   Hold ARB.   Added as needed labetalol for blood pressure control  Continue Imdur.

## 2024-11-03 NOTE — NURSING NOTE
"0100 Patient yelling in room for nurse. Nurse entered room, Patient stated \"I want to go home! I need to leave! I have a headache\" Patient A+O x4 no changes in Mental status at this time. Patient refused PRN anxiety medication, Patient refused pain medication,  patient continued to want to leave AMA.     Provider notified Ativan IV ordered.  Nurse talked to patient about home life using diversion and distraction to help relax patient. After 45 minutes patient was visually more relaxed and agreed to take Ativan IV. Ativan and tylenol given by RN. Patient was consoled, taken to bathroom. No further concerns at this time  "

## 2024-11-03 NOTE — ASSESSMENT & PLAN NOTE
"Lab Results   Component Value Date    HGBA1C 10.8 (H) 08/21/2024       Recent Labs     11/02/24  2108 11/03/24  0724 11/03/24  1129 11/03/24  1606   POCGLU 116 214* 226* 109   Patient states that she has been poorly controlled outpatient, averaging above 300 on any given day.  She follows with Dr. Oliva at [facility], but is seeking to establish care within [facility].    At home, she has admitted to being noncompliant with her Toujeo because she was not explicitly told what time to take it.    Her typical diet includes Cheerios with added sugar for breakfast, fast food/[employer] cheeseburger for lunch, and sometimes a home-cooked meal for dinner, otherwise eats out at restaurants with her .    Patient has received diabetic education/classes in the past, but is hesitant to make dietary changes.  She drinks diet soda.    Blood Sugar Average: Last 72 hrs:  (P) 234.8  Hyperglycemia on arrival with glucose 369; most recently improved to 246 after IV fluids.   Reports that her glucose was reading as \"high\" all day today on her new meter.   Currently wearing Dexcom G6  Outpatient insulin regimen is Novolog 22 units TID with meals which she reports compliance with and Toujeo 72 units daily but patient admits that she has not been taking this the past couple weeks.   Plan:  Continue 50 units glargine every morning  Currently on 16/22/22 units lispro 3 times daily AC scheduled  Due to acute decrease in blood sugars on 11/3, will continue regimen as is without any major changes.  Suspect that this is largely in part due to carb controlled diet while hospitalized  Anticipate that patient will need up titration of her insulin regimen after discharge with endocrine with close follow-up  Monitor accu-checks AC and HS.   SSI coverage. Algo 3  Hypoglycemia protocol.  Recommended follow-up within 2 weeks of discharge at [facility] endocrine/diabetes clinic in [place].  Provided address to clinic.   "

## 2024-11-03 NOTE — ASSESSMENT & PLAN NOTE
Prescribed alprazolam 0.5 mg BID prn (PDMP verified).  Reports taking 75 mg Effexor p.o. daily outpatient, self discontinued as dose increase did not prove helpful with patient's sleep within several days after dose change.  However, patient is agreeable to continue following patient, explained that it will take approximately 6 weeks to take full effect  Recommend outpatient taper

## 2024-11-03 NOTE — PROGRESS NOTES
Progress Note - Hospitalist   Name: Yajaira Marsh 78 y.o. female I MRN: [id number]  Unit/Bed#: W -01 I Date of Admission: 11/1/2024   Date of Service: 11/3/2024 I Hospital Day: 1    Assessment & Plan  Acute kidney injury superimposed on chronic kidney disease  (HCC)  Lab Results   Component Value Date    EGFR 30 11/03/2024    EGFR 22 11/02/2024    EGFR 16 11/01/2024    CREATININE 1.60 (H) 11/03/2024    CREATININE 2.07 (H) 11/02/2024    CREATININE 2.68 (H) 11/01/2024   POA with Cr 2.68.  Baseline Cr 1.5-1.7.   In the setting of reported diarrhea the past several weeks.   Received 1 L bolus of NS in the ED.   Continue IV fluids.   Avoid nephrotoxins and hypotension.   Hold ARB.  Monitor intake/ouptut.   Kidney function improvement today, continue IV fluids for another 24 hours, repeat morning  Added as needed labetalol for SBP greater than 160 given the patient is on 2 high dose orals and unable to resume ACE due to KATIE  Diarrhea  Ongoing for the past several weeks, reports 1-2 episodes per day.   Stool panel negative  Denies any floating lipids in stool or foul smell to suggest pancreatic insufficiency  Can consider stool lytes to determine stool anion gap if persistent/acute hospital issue  Diabetic polyneuropathy associated with type 2 diabetes mellitus (HCC)  Lab Results   Component Value Date    HGBA1C 10.8 (H) 08/21/2024       Recent Labs     11/02/24  2108 11/03/24  0724 11/03/24  1129 11/03/24  1606   POCGLU 116 214* 226* 109   Patient states that she has been poorly controlled outpatient, averaging above 300 on any given day.  She follows with Dr. Oliva at [facility], but is seeking to establish care within [facility].    At home, she has admitted to being noncompliant with her Toujeo because she was not explicitly told what time to take it.    Her typical diet includes Cheerios with added sugar for breakfast, fast food/[employer] cheeseburger for lunch, and sometimes a home-cooked meal for  "dinner, otherwise eats out at restaurants with her .    Patient has received diabetic education/classes in the past, but is hesitant to make dietary changes.  She drinks diet soda.    Blood Sugar Average: Last 72 hrs:  (P) 234.8  Hyperglycemia on arrival with glucose 369; most recently improved to 246 after IV fluids.   Reports that her glucose was reading as \"high\" all day today on her new meter.   Currently wearing Dexcom G6  Outpatient insulin regimen is Novolog 22 units TID with meals which she reports compliance with and Toujeo 72 units daily but patient admits that she has not been taking this the past couple weeks.   Plan:  Continue 50 units glargine every morning  Currently on 16/22/22 units lispro 3 times daily AC scheduled  Due to acute decrease in blood sugars on 11/3, will continue regimen as is without any major changes.  Suspect that this is largely in part due to carb controlled diet while hospitalized  Anticipate that patient will need up titration of her insulin regimen after discharge with endocrine with close follow-up  Monitor accu-checks AC and HS.   SSI coverage. Algo 3  Hypoglycemia protocol.  Recommended follow-up within 2 weeks of discharge at [facility] endocrine/diabetes clinic in [place].  Provided address to clinic.   Hypertension  BP elevated on presentation; continue to monitor and avoid hypotension in the setting of KATIE.   Hold ARB.   Added as needed labetalol for blood pressure control  Continue Imdur.   CAD (coronary artery disease)  S/p CABG in 2000.   Continue Imdur, Cardizem, Statin.  MADAN (generalized anxiety disorder)  Prescribed alprazolam 0.5 mg BID prn (PDMP verified).  Reports taking 75 mg Effexor p.o. daily outpatient, self discontinued as dose increase did not prove helpful with patient's sleep within several days after dose change.  However, patient is agreeable to continue following patient, explained that it will take approximately 6 weeks to take full " effect  Recommend outpatient taper      VTE Pharmacologic Prophylaxis: VTE Score: 7 High Risk (Score >/= 5) - Pharmacological DVT Prophylaxis Ordered: heparin. Sequential Compression Devices Ordered.    Mobility:   Basic Mobility Inpatient Raw Score: 14  JH-HLM Goal: 4: Move to chair/commode  JH-HLM Achieved: 4: Move to chair/commode  JH-HLM Goal achieved. Continue to encourage appropriate mobility.    Patient Centered Rounds: I performed bedside rounds with nursing staff today.   Discussions with Specialists or Other Care Team Provider: none    Education and Discussions with Family / Patient: Updated  ( and daughter) at bedside.    Current Length of Stay: 1 day(s)  Current Patient Status: Inpatient   Certification Statement: The patient will continue to require additional inpatient hospital stay due to titration of insulin regimen, trending creatinine for resolution of KATIE, and blood pressure control  Discharge Plan: Anticipate discharge in 48 hrs to home.    Code Status: Level 1 - Full Code    Subjective   Patient reports sleeping poorly while in the hospital.  She denies fevers, chills, nausea, vomiting, abdominal pain, excessive thirst, chest pain, shortness of breath.    Objective :  Temp:  [97.7 °F (36.5 °C)-98.8 °F (37.1 °C)] 98.3 °F (36.8 °C)  HR:  [72-78] 72  BP: (174-179)/(73-78) 179/78  Resp:  [12-16] 16  SpO2:  [98 %-99 %] 98 %  O2 Device: None (Room air)    Body mass index is 36.09 kg/m².     Input and Output Summary (last 24 hours):     Intake/Output Summary (Last 24 hours) at 11/3/2024 1613  Last data filed at 11/3/2024 1542  Gross per 24 hour   Intake 480 ml   Output 1 ml   Net 479 ml       Physical Exam  Vitals reviewed.   Constitutional:       General: She is not in acute distress.     Appearance: She is obese. She is not toxic-appearing or diaphoretic.   HENT:      Head: Normocephalic and atraumatic.      Mouth/Throat:      Mouth: Mucous membranes are moist.      Pharynx:  Oropharynx is clear.   Eyes:      General: No scleral icterus.  Cardiovascular:      Rate and Rhythm: Normal rate and regular rhythm.      Heart sounds: No murmur heard.     No friction rub. No gallop.   Pulmonary:      Effort: Pulmonary effort is normal. No respiratory distress.      Breath sounds: No stridor. No wheezing or rales.   Abdominal:      General: There is distension (obese).      Palpations: There is no mass.      Tenderness: There is no abdominal tenderness. There is no guarding.   Musculoskeletal:         General: Normal range of motion.      Right lower leg: Edema (trace) present.      Left lower leg: Edema (trace) present.   Skin:     General: Skin is warm and dry.      Findings: No rash.   Neurological:      Mental Status: She is alert and oriented to person, place, and time.      Sensory: No sensory deficit.   Psychiatric:         Mood and Affect: Mood normal.           Lines/Drains: None          Lab Results: I have reviewed the following results:   Results from last 7 days   Lab Units 11/02/24  0448 11/01/24  1911   WBC Thousand/uL  --  10.03   HEMOGLOBIN g/dL  --  11.5   HEMATOCRIT %  --  35.3   PLATELETS Thousands/uL 183 230   SEGS PCT %  --  64   LYMPHO PCT %  --  27   MONO PCT %  --  8   EOS PCT %  --  1     Results from last 7 days   Lab Units 11/03/24  0544   SODIUM mmol/L 140   POTASSIUM mmol/L 4.7   CHLORIDE mmol/L 111*   CO2 mmol/L 20*   BUN mg/dL 21   CREATININE mg/dL 1.60*   ANION GAP mmol/L 9   CALCIUM mg/dL 8.9   ALBUMIN g/dL 3.4*   TOTAL BILIRUBIN mg/dL 0.46   ALK PHOS U/L 92   ALT U/L 15   AST U/L 21   GLUCOSE RANDOM mg/dL 179*         Results from last 7 days   Lab Units 11/03/24  1606 11/03/24  1129 11/03/24  0724 11/02/24  2108 11/02/24  1735 11/02/24  1623 11/02/24  1110 11/02/24  0737 11/01/24  2130 11/01/24  1803   POC GLUCOSE mg/dl 109 226* 214* 116 195* 223* 352* 298* 246* 369*               Recent Cultures (last 7 days):         Imaging Results Review: No pertinent  imaging studies reviewed.  Other Study Results Review: No additional pertinent studies reviewed.    Last 24 Hours Medication List:     Current Facility-Administered Medications:     acetaminophen (TYLENOL) tablet 650 mg, Q6H PRN    ALPRAZolam (XANAX) tablet 0.5 mg, BID PRN    atorvastatin (LIPITOR) tablet 40 mg, Daily With Dinner    Cholecalciferol (VITAMIN D3) tablet 2,000 Units, Daily    diltiazem (CARDIZEM CD) 24 hr capsule 300 mg, Daily    heparin (porcine) subcutaneous injection 5,000 Units, Q8H [place] **AND** [COMPLETED] Platelet count, Once    insulin glargine (LANTUS) subcutaneous injection 50 Units 0.5 mL, QAM    insulin lispro (HumALOG/ADMELOG) 100 units/mL subcutaneous injection 1-5 Units, HS    insulin lispro (HumALOG/ADMELOG) 100 units/mL subcutaneous injection 1-6 Units, TID AC **AND** Fingerstick Glucose (POCT), TID AC    insulin lispro (HumALOG/ADMELOG) 100 units/mL subcutaneous injection 16 Units, Daily With Lunch    insulin lispro (HumALOG/ADMELOG) 100 units/mL subcutaneous injection 22 Units, Daily With Breakfast    insulin lispro (HumALOG/ADMELOG) 100 units/mL subcutaneous injection 22 Units, Daily With Dinner    isosorbide mononitrate (IMDUR) 24 hr tablet 60 mg, Daily    labetalol (NORMODYNE) injection 10 mg, Q4H PRN    levothyroxine tablet 75 mcg, Early Morning    sodium chloride 0.9 % infusion, Continuous, Last Rate: 75 mL/hr (11/02/24 2129)    Administrative Statements   Today, Patient Was Seen By: Kassidy Machado  I have spent a total time of 60 minutes in caring for this patient on the day of the visit/encounter including Diagnostic results, Risks and benefits of tx options, Instructions for management, Patient and family education, Importance of tx compliance, Risk factor reductions, Impressions, Documenting in the medical record, Reviewing / ordering tests, medicine, procedures  , and Obtaining or reviewing history  .    **Please Note: This note may have been constructed using a voice  recognition system.**

## 2024-11-04 VITALS
OXYGEN SATURATION: 95 % | WEIGHT: 203.71 LBS | RESPIRATION RATE: 16 BRPM | TEMPERATURE: 98.1 F | HEART RATE: 62 BPM | DIASTOLIC BLOOD PRESSURE: 61 MMHG | BODY MASS INDEX: 38.46 KG/M2 | SYSTOLIC BLOOD PRESSURE: 136 MMHG | HEIGHT: 61 IN

## 2024-11-04 LAB
ANION GAP SERPL CALCULATED.3IONS-SCNC: 5 MMOL/L (ref 4–13)
BASOPHILS # BLD AUTO: 0.03 THOUSANDS/ΜL (ref 0–0.1)
BASOPHILS NFR BLD AUTO: 0 % (ref 0–1)
BUN SERPL-MCNC: 18 MG/DL (ref 5–25)
CALCIUM SERPL-MCNC: 8.7 MG/DL (ref 8.4–10.2)
CHLORIDE SERPL-SCNC: 110 MMOL/L (ref 96–108)
CO2 SERPL-SCNC: 24 MMOL/L (ref 21–32)
CREAT SERPL-MCNC: 1.5 MG/DL (ref 0.6–1.3)
EOSINOPHIL # BLD AUTO: 0.09 THOUSAND/ΜL (ref 0–0.61)
EOSINOPHIL NFR BLD AUTO: 1 % (ref 0–6)
ERYTHROCYTE [DISTWIDTH] IN BLOOD BY AUTOMATED COUNT: 13.5 % (ref 11.6–15.1)
GFR SERPL CREATININE-BSD FRML MDRD: 33 ML/MIN/1.73SQ M
GLUCOSE SERPL-MCNC: 111 MG/DL (ref 65–140)
GLUCOSE SERPL-MCNC: 121 MG/DL (ref 65–140)
GLUCOSE SERPL-MCNC: 135 MG/DL (ref 65–140)
HCT VFR BLD AUTO: 31.7 % (ref 34.8–46.1)
HGB BLD-MCNC: 10.1 G/DL (ref 11.5–15.4)
IMM GRANULOCYTES # BLD AUTO: 0.03 THOUSAND/UL (ref 0–0.2)
IMM GRANULOCYTES NFR BLD AUTO: 0 % (ref 0–2)
LYMPHOCYTES # BLD AUTO: 2.96 THOUSANDS/ΜL (ref 0.6–4.47)
LYMPHOCYTES NFR BLD AUTO: 37 % (ref 14–44)
MCH RBC QN AUTO: 28.5 PG (ref 26.8–34.3)
MCHC RBC AUTO-ENTMCNC: 31.9 G/DL (ref 31.4–37.4)
MCV RBC AUTO: 90 FL (ref 82–98)
MONOCYTES # BLD AUTO: 0.55 THOUSAND/ΜL (ref 0.17–1.22)
MONOCYTES NFR BLD AUTO: 7 % (ref 4–12)
NEUTROPHILS # BLD AUTO: 4.26 THOUSANDS/ΜL (ref 1.85–7.62)
NEUTS SEG NFR BLD AUTO: 55 % (ref 43–75)
NRBC BLD AUTO-RTO: 0 /100 WBCS
PLATELET # BLD AUTO: 191 THOUSANDS/UL (ref 149–390)
PMV BLD AUTO: 10.5 FL (ref 8.9–12.7)
POTASSIUM SERPL-SCNC: 4.3 MMOL/L (ref 3.5–5.3)
RBC # BLD AUTO: 3.54 MILLION/UL (ref 3.81–5.12)
SODIUM SERPL-SCNC: 139 MMOL/L (ref 135–147)
WBC # BLD AUTO: 7.92 THOUSAND/UL (ref 4.31–10.16)

## 2024-11-04 PROCEDURE — 85025 COMPLETE CBC W/AUTO DIFF WBC: CPT

## 2024-11-04 PROCEDURE — 80048 BASIC METABOLIC PNL TOTAL CA: CPT

## 2024-11-04 PROCEDURE — 82948 REAGENT STRIP/BLOOD GLUCOSE: CPT

## 2024-11-04 PROCEDURE — 99239 HOSP IP/OBS DSCHRG MGMT >30: CPT | Performed by: FAMILY MEDICINE

## 2024-11-04 RX ORDER — VENLAFAXINE 75 MG/1
75 TABLET ORAL DAILY
Qty: 30 TABLET | Refills: 0 | Status: SHIPPED | OUTPATIENT
Start: 2024-11-05 | End: 2024-12-05

## 2024-11-04 RX ADMIN — DILTIAZEM HYDROCHLORIDE 300 MG: 180 CAPSULE, COATED, EXTENDED RELEASE ORAL at 08:22

## 2024-11-04 RX ADMIN — HEPARIN SODIUM 5000 UNITS: 5000 INJECTION INTRAVENOUS; SUBCUTANEOUS at 06:29

## 2024-11-04 RX ADMIN — INSULIN LISPRO 22 UNITS: 100 INJECTION, SOLUTION INTRAVENOUS; SUBCUTANEOUS at 08:22

## 2024-11-04 RX ADMIN — LEVOTHYROXINE SODIUM 75 MCG: 75 TABLET ORAL at 06:29

## 2024-11-04 RX ADMIN — ISOSORBIDE MONONITRATE 60 MG: 60 TABLET, EXTENDED RELEASE ORAL at 08:22

## 2024-11-04 RX ADMIN — VENLAFAXINE 75 MG: 37.5 TABLET ORAL at 08:22

## 2024-11-04 RX ADMIN — INSULIN LISPRO 16 UNITS: 100 INJECTION, SOLUTION INTRAVENOUS; SUBCUTANEOUS at 11:55

## 2024-11-04 RX ADMIN — INSULIN GLARGINE 50 UNITS: 100 INJECTION, SOLUTION SUBCUTANEOUS at 08:22

## 2024-11-04 RX ADMIN — Medication 2000 UNITS: at 08:22

## 2024-11-04 NOTE — ASSESSMENT & PLAN NOTE
Lab Results   Component Value Date    HGBA1C 10.8 (H) 08/21/2024       Recent Labs     11/03/24  1129 11/03/24  1606 11/03/24  2103 11/04/24  0722   POCGLU 226* 109 111 121   Patient states that she has been poorly controlled outpatient, averaging above 300 on any given day.  She follows with Dr. Oliva at [facility], but is seeking to establish care within [facility].    At home, she has admitted to being noncompliant with her Toujeo because she was not explicitly told what time to take it.    Her typical diet includes Cheerios with added sugar for breakfast, fast food/[employer] cheeseburger for lunch, and sometimes a home-cooked meal for dinner, otherwise eats out at restaurants with her .    Patient has received diabetic education/classes in the past, but is hesitant to make dietary changes.  She drinks diet soda.    Blood Sugar Average: Last 72 hrs:  (P) 215  Outpatient insulin regimen is Novolog 22 units TID with meals which she reports compliance with and Toujeo 72 units daily but patient admits that she has not been taking this the past couple weeks.  Resume home medication, and advised the patient to follow-up with her own endocrinologist for further adjustment of the treatment.

## 2024-11-04 NOTE — PLAN OF CARE
Problem: PAIN - ADULT  Goal: Verbalizes/displays adequate comfort level or baseline comfort level  Description: Interventions:  - Encourage patient to monitor pain and request assistance  - Assess pain using appropriate pain scale  - Administer analgesics based on type and severity of pain and evaluate response  - Implement non-pharmacological measures as appropriate and evaluate response  - Consider cultural and social influences on pain and pain management  - Notify physician/advanced practitioner if interventions unsuccessful or patient reports new pain  Outcome: Progressing     Problem: INFECTION - ADULT  Goal: Absence or prevention of progression during hospitalization  Description: INTERVENTIONS:  - Assess and monitor for signs and symptoms of infection  - Monitor lab/diagnostic results  - Monitor all insertion sites, i.e. indwelling lines, tubes, and drains  - Monitor endotracheal if appropriate and nasal secretions for changes in amount and color  - Martelle appropriate cooling/warming therapies per order  - Administer medications as ordered  - Instruct and encourage patient and family to use good hand hygiene technique  - Identify and instruct in appropriate isolation precautions for identified infection/condition  Outcome: Progressing  Goal: Absence of fever/infection during neutropenic period  Description: INTERVENTIONS:  - Monitor WBC    Outcome: Progressing     Problem: SAFETY ADULT  Goal: Patient will remain free of falls  Description: INTERVENTIONS:  - Educate patient/family on patient safety including physical limitations  - Instruct patient to call for assistance with activity   - Consult OT/PT to assist with strengthening/mobility   - Keep Call bell within reach  - Keep bed low and locked with side rails adjusted as appropriate  - Keep care items and personal belongings within reach  - Initiate and maintain comfort rounds  - Make Fall Risk Sign visible to staff  - Offer Toileting every  Hours,  in advance of need  - Initiate/Maintain alarm  - Obtain necessary fall risk management equipment:   - Apply yellow socks and bracelet for high fall risk patients  - Consider moving patient to room near nurses station  Outcome: Progressing  Goal: Maintain or return to baseline ADL function  Description: INTERVENTIONS:  -  Assess patient's ability to carry out ADLs; assess patient's baseline for ADL function and identify physical deficits which impact ability to perform ADLs (bathing, care of mouth/teeth, toileting, grooming, dressing, etc.)  - Assess/evaluate cause of self-care deficits   - Assess range of motion  - Assess patient's mobility; develop plan if impaired  - Assess patient's need for assistive devices and provide as appropriate  - Encourage maximum independence but intervene and supervise when necessary  - Involve family in performance of ADLs  - Assess for home care needs following discharge   - Consider OT consult to assist with ADL evaluation and planning for discharge  - Provide patient education as appropriate  Outcome: Progressing  Goal: Maintains/Returns to pre admission functional level  Description: INTERVENTIONS:  - Perform AM-PAC 6 Click Basic Mobility/ Daily Activity assessment daily.  - Set and communicate daily mobility goal to care team and patient/family/caregiver.   - Collaborate with rehabilitation services on mobility goals if consulted  - Perform Range of Motion  times a day.  - Reposition patient every hours.  - Dangle patient  times a day  - Stand patient  times a day  - Ambulate patient  times a day  - Out of bed to chair  times a day   - Out of bed for meals times a day  - Out of bed for toileting  - Record patient progress and toleration of activity level   Outcome: Progressing     Problem: DISCHARGE PLANNING  Goal: Discharge to home or other facility with appropriate resources  Description: INTERVENTIONS:  - Identify barriers to discharge w/patient and caregiver  - Arrange for  needed discharge resources and transportation as appropriate  - Identify discharge learning needs (meds, wound care, etc.)  - Arrange for interpretive services to assist at discharge as needed  - Refer to Case Management Department for coordinating discharge planning if the patient needs post-hospital services based on physician/advanced practitioner order or complex needs related to functional status, cognitive ability, or social support system  Outcome: Progressing     Problem: Knowledge Deficit  Goal: Patient/family/caregiver demonstrates understanding of disease process, treatment plan, medications, and discharge instructions  Description: Complete learning assessment and assess knowledge base.  Interventions:  - Provide teaching at level of understanding  - Provide teaching via preferred learning methods  Outcome: Progressing     Problem: Prexisting or High Potential for Compromised Skin Integrity  Goal: Skin integrity is maintained or improved  Description: INTERVENTIONS:  - Identify patients at risk for skin breakdown  - Assess and monitor skin integrity  - Assess and monitor nutrition and hydration status  - Monitor labs   - Assess for incontinence   - Turn and reposition patient  - Assist with mobility/ambulation  - Relieve pressure over bony prominences  - Avoid friction and shearing  - Provide appropriate hygiene as needed including keeping skin clean and dry  - Evaluate need for skin moisturizer/barrier cream  - Collaborate with interdisciplinary team   - Patient/family teaching  - Consider wound care consult   Outcome: Progressing

## 2024-11-04 NOTE — ASSESSMENT & PLAN NOTE
Lab Results   Component Value Date    EGFR 33 11/04/2024    EGFR 30 11/03/2024    EGFR 22 11/02/2024    CREATININE 1.50 (H) 11/04/2024    CREATININE 1.60 (H) 11/03/2024    CREATININE 2.07 (H) 11/02/2024   POA with Cr 2.68.  Baseline Cr 1.5-1.7.    condition resolved

## 2024-11-04 NOTE — DISCHARGE SUMMARY
Discharge Summary - Hospitalist   Name: Yajaira Marsh 78 y.o. female I MRN: [id number]  Unit/Bed#: W -01 I Date of Admission: 11/1/2024   Date of Service: 11/4/2024 I Hospital Day: 2     Assessment & Plan  Acute kidney injury superimposed on chronic kidney disease  (HCC)  Lab Results   Component Value Date    EGFR 33 11/04/2024    EGFR 30 11/03/2024    EGFR 22 11/02/2024    CREATININE 1.50 (H) 11/04/2024    CREATININE 1.60 (H) 11/03/2024    CREATININE 2.07 (H) 11/02/2024   POA with Cr 2.68.  Baseline Cr 1.5-1.7.    condition resolved  Diarrhea  Ongoing for the past several weeks, reports 1-2 episodes per day.   Stool panel negative  Denies any floating lipids in stool or foul smell to suggest pancreatic insufficiency    Diabetic polyneuropathy associated with type 2 diabetes mellitus (HCC)  Lab Results   Component Value Date    HGBA1C 10.8 (H) 08/21/2024       Recent Labs     11/03/24  1129 11/03/24  1606 11/03/24  2103 11/04/24  0722   POCGLU 226* 109 111 121   Patient states that she has been poorly controlled outpatient, averaging above 300 on any given day.  She follows with Dr. Oliva at [facility], but is seeking to establish care within [facility].    At home, she has admitted to being noncompliant with her Toujeo because she was not explicitly told what time to take it.    Her typical diet includes Cheerios with added sugar for breakfast, fast food/[employer] cheeseburger for lunch, and sometimes a home-cooked meal for dinner, otherwise eats out at restaurants with her .    Patient has received diabetic education/classes in the past, but is hesitant to make dietary changes.  She drinks diet soda.    Blood Sugar Average: Last 72 hrs:  (P) 215  Outpatient insulin regimen is Novolog 22 units TID with meals which she reports compliance with and Toujeo 72 units daily but patient admits that she has not been taking this the past couple weeks.  Resume home medication, and advised the patient to  follow-up with her own endocrinologist for further adjustment of the treatment.    Hypertension  BP elevated on presentation; continue to monitor and avoid hypotension in the setting of KATIE.   Hold ARB.   Added as needed labetalol for blood pressure control  Continue Imdur.   CAD (coronary artery disease)  S/p CABG in 2000.   Continue Imdur, Cardizem, Statin.  MADAN (generalized anxiety disorder)  Prescribed alprazolam 0.5 mg BID prn (PDMP verified).  Reports taking 75 mg Effexor p.o. daily outpatient, self discontinued as dose increase did not prove helpful with patient's sleep within several days after dose change.  However, patient is agreeable to continue following patient, explained that it will take approximately 6 weeks to take full effect  Recommend outpatient taper     Discharging Physician / Practitioner: Joshua Romo MD  PCP: Sukumar Clemens DO  Admission Date:   Admission Orders (From admission, onward)       Ordered        11/02/24 1015  INPATIENT ADMISSION  Once            11/01/24 1944  Place in Observation  Once                          Discharge Date: 11/04/24    Medical Problems       Resolved Problems  Date Reviewed: 10/17/2024   None         Consultations During Hospital Stay:  None    Procedures Performed:   No orders to display         Significant Findings / Test Results:   Lab Results   Component Value Date    WBC 7.92 11/04/2024    HGB 10.1 (L) 11/04/2024    HCT 31.7 (L) 11/04/2024    MCV 90 11/04/2024     11/04/2024     Lab Results   Component Value Date     11/16/2017    SODIUM 139 11/04/2024    K 4.3 11/04/2024     (H) 11/04/2024    CO2 24 11/04/2024    ANIONGAP 10 11/09/2015    AGAP 5 11/04/2024    BUN 18 11/04/2024    CREATININE 1.50 (H) 11/04/2024    GLUC 111 11/04/2024    GLUF 154 (H) 09/02/2023    CALCIUM 8.7 11/04/2024    AST 21 11/03/2024    ALT 15 11/03/2024    ALKPHOS 92 11/03/2024    PROT 7.6 11/16/2017    TP 6.5 11/03/2024    BILITOT 0.5 11/16/2017     TBILI 0.46 11/03/2024    EGFR 33 11/04/2024     Collected Updated Procedure Result Status Patient Facility Result Comment    11/02/2024 1412 11/03/2024 1154 Stool Enteric Bacterial Panel by PCR [[id number]]    Stool from Rectum    Final result [facility] This test has been performed using the FDA-approved BD MAX system for the qualitative detection of Salmonella spp., Campylobacter spp. (jejuni and coli), Shigella spp./Enteroinvasive E. coli (EIEC), and Shiga toxin 1 (stx1)/Shiga toxin 2 (stx2) from unpreserved liquid or soft stool or Araceli-Quentin preserved stool specimens from patients with suspected acute gastroenteritis, enteritis, or colitis using polymerase chain reaction (PCR) methodology.   Negative PCR results do not preclude infection and should not be used as the sole basis for treatment or other patient management decisions.   Positive PCR results are indicative of infection, but do not necessarily indicate the presence of viable organisms and do not rule out co-infection with other bacteria or viruses.   As with all polymerase-chain reaction methodology, extremely low levels of target below the limit of detection may be detected, but results may not be reproducible.   All positive results are reflexed to culture for confirmation and/or for susceptibility testing.   All test results should be used as an adjunct to clinical observations and other information available to the provider.    Component Value   Salmonella sp PCR Negative    Shigella sp/Enteroinvasive E. coli (EIEC) PCR Negative    Campylobacter sp (jejuni and coli) PCR Negative    Shiga toxin 1/Shiga toxin 2 genes PCR Negative           Incidental Findings:   As mentioned above    Test Results Pending at Discharge (will require follow up):   none     Outpatient Tests Requested:  none    Complications:  none    Reason for Admission: Hyperglycemia    Hospital Course:     Yajaira Marsh is a 78 y.o. female patient who  "originally presented to the hospital on 11/1/2024 due to hyperglycemia, most likely noncompliant.  Patient does follows up with endocrinologist outpatient basis.  Received IV hydration with significant improvement, patient also received adjust the dose of insulin.  During the hospitalization, patient found KATIE which resolved with above treatment.  Patient remained medically stable to be discharged back home with follow-up with PCP and her own endocrinologist outpatient basis.    All lab results, imaging findings, treatment plan and option discussed in details with patient and her  over the phone.  Verbalized understanding agrees.    Please see above list of diagnoses and related plan for additional information.     Condition at Discharge: stable     Discharge Day Visit / Exam:     Subjective: Seen and evaluated during the rounds.  Resting comfortably.  Denies any significant complaint.  Vitals: Blood Pressure: 136/61 (11/04/24 0723)  Pulse: 62 (11/04/24 0723)  Temperature: 98.1 °F (36.7 °C) (11/04/24 0723)  Temp Source: Oral (11/01/24 1756)  Respirations: 16 (11/03/24 2234)  Height: 5' 1\" (154.9 cm) (11/01/24 2104)  Weight - Scale: 92.4 kg (203 lb 11.3 oz) (11/04/24 0600)  SpO2: 95 % (11/04/24 0723)  Exam:   Physical Exam  Vitals and nursing note reviewed.   Constitutional:       Appearance: Normal appearance. She is not ill-appearing or diaphoretic.   HENT:      Mouth/Throat:      Mouth: Mucous membranes are moist.      Pharynx: No oropharyngeal exudate or posterior oropharyngeal erythema.   Eyes:      General: No scleral icterus.        Left eye: No discharge.      Extraocular Movements: Extraocular movements intact.      Conjunctiva/sclera: Conjunctivae normal.      Pupils: Pupils are equal, round, and reactive to light.   Cardiovascular:      Rate and Rhythm: Normal rate.      Heart sounds: No murmur heard.     No friction rub. No gallop.   Pulmonary:      Effort: Pulmonary effort is normal. No " respiratory distress.      Breath sounds: No stridor. No wheezing or rhonchi.   Abdominal:      General: There is no distension.      Palpations: There is no mass.      Tenderness: There is no abdominal tenderness.      Hernia: No hernia is present.   Musculoskeletal:      Right lower leg: No edema.      Left lower leg: No edema.   Neurological:      General: No focal deficit present.      Mental Status: She is oriented to person, place, and time.      Cranial Nerves: No cranial nerve deficit.      Sensory: No sensory deficit.      Motor: No weakness.      Coordination: Coordination normal.       Discussion with Family: The  over the phone-left voice message for     Discharge instructions/Information to patient and family:   See after visit summary for information provided to patient and family.      Provisions for Follow-Up Care:  See after visit summary for information related to follow-up care and any pertinent home health orders.      Disposition:     Home    For Discharges to [facility] SNF:   Not Applicable to this Patient - Not Applicable to this Patient    Planned Readmission: If condition get worse     Discharge Statement:  Greater than 50% of the total time was spent examining patient, answering all patient questions, arranging and discussing plan of care with patient as well as directly providing post-discharge instructions.  Additional time then spent on discharge activities.    Discharge Medications:  See after visit summary for reconciled discharge medications provided to patient and family.      ** Please Note: This note has been constructed using a voice recognition system **

## 2024-11-04 NOTE — ASSESSMENT & PLAN NOTE
Ongoing for the past several weeks, reports 1-2 episodes per day.   Stool panel negative  Denies any floating lipids in stool or foul smell to suggest pancreatic insufficiency

## 2024-11-05 ENCOUNTER — TRANSITIONAL CARE MANAGEMENT (OUTPATIENT)
Dept: INTERNAL MEDICINE CLINIC | Facility: CLINIC | Age: 79
End: 2024-11-05

## 2024-11-05 DIAGNOSIS — F41.0 PANIC ATTACKS: ICD-10-CM

## 2024-11-05 DIAGNOSIS — F41.1 GAD (GENERALIZED ANXIETY DISORDER): ICD-10-CM

## 2024-11-05 DIAGNOSIS — F41.9 ANXIETY: ICD-10-CM

## 2024-11-05 RX ORDER — ALPRAZOLAM 0.5 MG
0.5 TABLET ORAL 2 TIMES DAILY PRN
Qty: 60 TABLET | Refills: 0 | Status: SHIPPED | OUTPATIENT
Start: 2024-11-05

## 2024-11-05 NOTE — TELEPHONE ENCOUNTER
Reason for call:   [x] Refill   [] Prior Auth  [] Other:     Office:   [x] PCP/Provider - [facility]   [] Specialty/Provider -     Medication:   ~ ALPRAZolam (XANAX) 0.5 mg tablet - Take 1 tablet (0.5 mg total) by mouth 2 (two) times a day as needed for anxiety    Pharmacy:   [facility]/pharmacy #[id number] [facility] [id number]     Does the patient have enough for 3 days?   [] Yes   [x] No - Send as HP to POD

## 2024-11-11 ENCOUNTER — TELEPHONE (OUTPATIENT)
Age: 79
End: 2024-11-11

## 2024-11-12 ENCOUNTER — TELEPHONE (OUTPATIENT)
Age: 79
End: 2024-11-12

## 2024-11-12 NOTE — TELEPHONE ENCOUNTER
Called pt about canceling her apt today she stated she was fighting with her S/O. PT seemed kind of all over the place She sated her psych apt was on a wait list for 6 months and that just was not going to work for her. She is having trouble sleeping and her mood is all over the place.  I tried to reschedule her but she stated to me she would just find someone else to follow up with. Maybe you can reach out to patient regarding what is going on. Please advise

## 2024-11-12 NOTE — TELEPHONE ENCOUNTER
Patient called in today to cancel her TCM for 11/12/24 and states it is because she was not given a sooner appointment. I tried to explain to patient that she was scheduled in the time frame for a TCM but she was upset about it and wanted to cancel appointment

## 2024-11-13 NOTE — TELEPHONE ENCOUNTER
Pt called in to reschedule her hospital follow up, got pt scheduled for 11/18 at 10 am. But pt is having some issues, she's very weak and her sugars high at 300 with being shaky. And having trouble sleeping please advise ASAP

## 2024-11-18 ENCOUNTER — TELEPHONE (OUTPATIENT)
Age: 79
End: 2024-11-18

## 2024-11-18 ENCOUNTER — OFFICE VISIT (OUTPATIENT)
Dept: INTERNAL MEDICINE CLINIC | Facility: CLINIC | Age: 79
End: 2024-11-18
Payer: COMMERCIAL

## 2024-11-18 VITALS
WEIGHT: 193 LBS | SYSTOLIC BLOOD PRESSURE: 130 MMHG | OXYGEN SATURATION: 99 % | DIASTOLIC BLOOD PRESSURE: 60 MMHG | HEART RATE: 62 BPM | BODY MASS INDEX: 36.47 KG/M2

## 2024-11-18 DIAGNOSIS — E66.9 OBESITY (BMI 30-39.9): ICD-10-CM

## 2024-11-18 DIAGNOSIS — M25.562 CHRONIC PAIN OF BOTH KNEES: ICD-10-CM

## 2024-11-18 DIAGNOSIS — E11.8 TYPE 2 DIABETES MELLITUS WITH COMPLICATION, WITH LONG-TERM CURRENT USE OF INSULIN (HCC): Primary | ICD-10-CM

## 2024-11-18 DIAGNOSIS — N18.32 STAGE 3B CHRONIC KIDNEY DISEASE (HCC): ICD-10-CM

## 2024-11-18 DIAGNOSIS — G89.29 CHRONIC PAIN OF BOTH KNEES: ICD-10-CM

## 2024-11-18 DIAGNOSIS — F32.0 CURRENT MILD EPISODE OF MAJOR DEPRESSIVE DISORDER WITHOUT PRIOR EPISODE (HCC): ICD-10-CM

## 2024-11-18 DIAGNOSIS — K59.00 CONSTIPATION, UNSPECIFIED CONSTIPATION TYPE: ICD-10-CM

## 2024-11-18 DIAGNOSIS — E03.9 HYPOTHYROIDISM, UNSPECIFIED TYPE: ICD-10-CM

## 2024-11-18 DIAGNOSIS — Z79.4 TYPE 2 DIABETES MELLITUS WITH COMPLICATION, WITH LONG-TERM CURRENT USE OF INSULIN (HCC): Primary | ICD-10-CM

## 2024-11-18 DIAGNOSIS — M25.561 CHRONIC PAIN OF BOTH KNEES: ICD-10-CM

## 2024-11-18 PROCEDURE — G2211 COMPLEX E/M VISIT ADD ON: HCPCS | Performed by: INTERNAL MEDICINE

## 2024-11-18 PROCEDURE — 99214 OFFICE O/P EST MOD 30 MIN: CPT | Performed by: INTERNAL MEDICINE

## 2024-11-18 NOTE — ASSESSMENT & PLAN NOTE
Lab Results   Component Value Date    EGFR 33 11/04/2024    EGFR 30 11/03/2024    EGFR 22 11/02/2024    CREATININE 1.50 (H) 11/04/2024    CREATININE 1.60 (H) 11/03/2024    CREATININE 2.07 (H) 11/02/2024   Drink adequate amount of water, avoid anti-inflammatories, reduce sodium in the diet and will continue to monitor the kidney function drink 4 to 6 glasses of water daily avoid anti-inflammatories reduce sodium in the diet    Orders:    Comprehensive metabolic panel; Future    CBC and Platelet; Future    Ambulatory referral to Psych Services; Future    Comprehensive metabolic panel; Future    CBC (Includes Diff/Plt) (Refl); Future    Iron Panel (Includes Ferritin, Iron Sat%, Iron, and TIBC); Future    TSH, 3rd generation; Future    Glucometer    Glucometer test strips    Lancets

## 2024-11-18 NOTE — PROGRESS NOTES
Name: Yajaira Marhs      : 1945      MRN: [id number]  Encounter Provider: Sukumar Clemens DO  Encounter Date: 2024   Encounter department: [facility]  :  Assessment & Plan  Type 2 diabetes mellitus with complication, with long-term current use of insulin (HCC)  I have counselled the pt to follow a healthy and balanced diet ,and recommend routine exercise.  I will be ordering diabetic laboratories including comprehensive metabolic panel, hemoglobin A1c, urine microalbumin, lipid panel.  Patient is working with endocrinology suboptimal hemoglobin A1c  Lab Results   Component Value Date    HGBA1C 10.8 (H) 2024       Orders:    Comprehensive metabolic panel; Future    CBC and Platelet; Future    Ambulatory referral to Psych Services; Future    Comprehensive metabolic panel; Future    CBC (Includes Diff/Plt) (Refl); Future    Iron Panel (Includes Ferritin, Iron Sat%, Iron, and TIBC); Future    TSH, 3rd generation; Future    Glucometer    Glucometer test strips    Lancets    Stage 3b chronic kidney disease (HCC)  Lab Results   Component Value Date    EGFR 33 2024    EGFR 30 2024    EGFR 22 2024    CREATININE 1.50 (H) 2024    CREATININE 1.60 (H) 2024    CREATININE 2.07 (H) 2024   Drink adequate amount of water, avoid anti-inflammatories, reduce sodium in the diet and will continue to monitor the kidney function drink 4 to 6 glasses of water daily avoid anti-inflammatories reduce sodium in the diet    Orders:    Comprehensive metabolic panel; Future    CBC and Platelet; Future    Ambulatory referral to Psych Services; Future    Comprehensive metabolic panel; Future    CBC (Includes Diff/Plt) (Refl); Future    Iron Panel (Includes Ferritin, Iron Sat%, Iron, and TIBC); Future    TSH, 3rd generation; Future    Glucometer    Glucometer test strips    Lancets    Hypothyroidism, unspecified type  Hypothyroidism controlled the patient is  currently euthyroid I will be ordering a TSH prior to the next office visit and the patient will continue with current medical regiment; we will continue to monitor the patient's progress.  Orders:    TSH, 3rd generation; Future    Obesity (BMI 30-39.9)  Obesity -I have counseled patient following healthy and balanced diet, I would like the patient to lose weight, I would like the patient exercise routinely; we will continue monitor the patient's progress.         Constipation, unspecified constipation type  Intermittent constipation with diarrhea will have patient see colorectal will likely need colonoscopy  Orders:    Ambulatory Referral to Colorectal Surgery; Future    Chronic pain of both knees  Will have patient see orthopedics she has had steroid injection in the past without improvements in symptoms  Orders:    Ambulatory Referral to Orthopedic Surgery; Future    Current mild episode of major depressive disorder without prior episode (HCC)  Patient does report to me symptoms of depression no SI she does not want to increase the dose of Effexor 75 mg once daily she reports that she feels she is doing little bit better with that and she is sleeping better with the Effexor.  She did not want to go back to psychiatry she is willing to see a counselor                History of Present Illness   Acute kidney injury superimposed on chronic kidney disease  (HCC)        Lab Results   Component Value Date     EGFR 33 11/04/2024     EGFR 30 11/03/2024     EGFR 22 11/02/2024     CREATININE 1.50 (H) 11/04/2024     CREATININE 1.60 (H) 11/03/2024     CREATININE 2.07 (H) 11/02/2024   POA with Cr 2.68.  Baseline Cr 1.5-1.7.    condition resolved drink adequate amount of water, avoid anti-inflammatories, reduce sodium in the diet and will continue to monitor the kidney function    Diarrhea  Ongoing for the past several weeks, reports 1-2 episodes per day.   Stool panel negative  Denies any floating lipids in stool or foul  smell to suggest pancreatic insufficiency  RTO in 4 months call with any problems  Diabetic polyneuropathy associated with type 2 diabetes mellitus (HCC)        Lab Results   Component Value Date     HGBA1C 10.8 (H) 08/21/2024                Recent Labs     11/03/24  1129 11/03/24  1606 11/03/24  2103 11/04/24  0722   POCGLU 226* 109 111 121   Patient states that she has been poorly controlled outpatient, averaging above 300 on any given day.  She follows with Dr. Oliva at [facility], but is seeking to establish care within [facility].     At home, she has admitted to being noncompliant with her Toujeo because she was not explicitly told what time to take it.     Her typical diet includes Cheerios with added sugar for breakfast, fast food/[employer] cheeseburger for lunch, and sometimes a home-cooked meal for dinner, otherwise eats out at restaurants with her .     Patient has received diabetic education/classes in the past, but is hesitant to make dietary changes.  She drinks diet soda.     Blood Sugar Average: Last 72 hrs:  (P) 215  Outpatient insulin regimen is Novolog 22 units TID with meals which she reports compliance with and Toujeo 72 units daily but patient admits that she has not been taking this the past couple weeks.  Resume home medication, and advised the patient to follow-up with her own endocrinologist for further adjustment of the treatment.     Hypertension  BP elevated on presentation; continue to monitor and avoid hypotension in the setting of KATIE.   Hold ARB.   Added as needed labetalol for blood pressure control  Continue Imdur.   CAD (coronary artery disease)  S/p CABG in 2000.   Continue Imdur, Cardizem, Statin.  MADAN (generalized anxiety disorder)  Prescribed alprazolam 0.5 mg BID prn (PDMP verified).  Reports taking 75 mg Effexor p.o. daily outpatient, self discontinued as dose increase did not prove helpful with patient's sleep within several days after dose change.  However,  patient is agreeable to continue following patient, explained that it will take approximately 6 weeks to take full effect  Recommend outpatient taper  HPI 78-year old female coming in for a follow up office visit regarding type 2 diabetes, chronic kidney disease stage IIIb, hypothyroidism, obesity, constipation, chronic pain of bilateral knees and depression; the patient reports me compliant taking medications without untoward side effects the.  The patient is here to review his medical condition, update me on the medical condition and the patient reports me 1 hospitalizations and no ER visits.  Patient does report to me 1 admission for diarrhea and KATIE I did review the discharge summary laboratories and test completed in the hospitalization.  Since discharge the patient reports me symptoms of crying a lot since coming home from the hospital , getting a long a little better I hate where I live, can't afford to sell ,  I dont get out , no si sleep improving she reports me last week she was not getting along with her  while they were arguing reports me things are going better with her  they are now communicating she reports me and her primary reason for depression is she does not like where she is living and she is unable to move because of financial reasons no SI she would like to work with a counselor she reports me she is now on Effexor 75 mg once daily reports me it is somewhat helpful in helping her sleep she does not want to change the dose of the Effexor reports me she would like to start counseling at this point.  Review of Systems   Constitutional:  Negative for activity change, appetite change and unexpected weight change.   HENT:  Negative for congestion and postnasal drip.    Eyes:  Negative for visual disturbance.   Respiratory:  Negative for cough and shortness of breath.    Cardiovascular:  Negative for chest pain.   Gastrointestinal:  Negative for abdominal pain, diarrhea, nausea and  vomiting.   Neurological:  Negative for dizziness, light-headedness and headaches.   Hematological:  Negative for adenopathy.   Depressed mood no SI constipation  Social History     Tobacco Use    Smoking status: Former     Current packs/day: 0.00     Average packs/day: 1 pack/day for 6.0 years (6.0 ttl pk-yrs)     Types: Cigarettes     Start date:      Quit date:      Years since quittin.9     Passive exposure: Never    Smokeless tobacco: Never    Tobacco comments:     Smoked about a half a pack for about 4 yrs.  Quite about 50 yrs ago.   Vaping Use    Vaping status: Never Used   Substance and Sexual Activity    Alcohol use: Never    Drug use: No    Sexual activity: Not Currently     Comment: No known STD risk factors        Objective   /60 (Patient Position: Sitting, Cuff Size: Standard)   Pulse 62   Wt 87.5 kg (193 lb)   SpO2 99%   BMI 36.47 kg/m²      Physical Exam  Vitals and nursing note reviewed.   Constitutional:       General: She is not in acute distress.     Appearance: Normal appearance. She is well-developed. She is not ill-appearing, toxic-appearing or diaphoretic.   HENT:      Head: Normocephalic and atraumatic.      Right Ear: External ear normal.      Left Ear: External ear normal.      Nose: Nose normal.   Eyes:      Pupils: Pupils are equal, round, and reactive to light.   Cardiovascular:      Rate and Rhythm: Normal rate and regular rhythm.      Heart sounds: Normal heart sounds. No murmur heard.  Pulmonary:      Effort: Pulmonary effort is normal.      Breath sounds: Normal breath sounds.   Abdominal:      General: There is no distension.      Palpations: Abdomen is soft.      Tenderness: There is no abdominal tenderness. There is no guarding.   Psychiatric:         Mood and Affect: Mood is depressed. Mood is not anxious.         Thought Content: Thought content does not include suicidal ideation.

## 2024-11-18 NOTE — ASSESSMENT & PLAN NOTE
Patient does report to me symptoms of depression no SI she does not want to increase the dose of Effexor 75 mg once daily she reports that she feels she is doing little bit better with that and she is sleeping better with the Effexor.  She did not want to go back to psychiatry she is willing to see a counselor

## 2024-11-18 NOTE — ASSESSMENT & PLAN NOTE
I have counselled the pt to follow a healthy and balanced diet ,and recommend routine exercise.  I will be ordering diabetic laboratories including comprehensive metabolic panel, hemoglobin A1c, urine microalbumin, lipid panel.  Patient is working with endocrinology suboptimal hemoglobin A1c  Lab Results   Component Value Date    HGBA1C 10.8 (H) 08/21/2024       Orders:    Comprehensive metabolic panel; Future    CBC and Platelet; Future    Ambulatory referral to Psych Services; Future    Comprehensive metabolic panel; Future    CBC (Includes Diff/Plt) (Refl); Future    Iron Panel (Includes Ferritin, Iron Sat%, Iron, and TIBC); Future    TSH, 3rd generation; Future    Glucometer    Glucometer test strips    Lancets

## 2024-11-18 NOTE — ASSESSMENT & PLAN NOTE
Intermittent constipation with diarrhea will have patient see colorectal will likely need colonoscopy  Orders:    Ambulatory Referral to Colorectal Surgery; Future

## 2024-11-18 NOTE — PATIENT INSTRUCTIONS
"Patient Education     Type 2 diabetes   The Basics   Written by the doctors and editors at [facility]   What is type 2 diabetes? -- This is a disorder that disrupts the way the body uses sugar. It is sometimes called type 2 diabetes mellitus.  All of the cells in the body need sugar to work normally. Sugar gets into the cells with the help of a hormone called insulin. Insulin is made by the pancreas, an organ in the belly. If there is not enough insulin, or if cells in the body don't respond normally to insulin, sugar builds up in the blood. That is what happens to people with diabetes.  There are 2 different types of diabetes:   In type 1 diabetes, the pancreas makes little or no insulin.   In type 2 diabetes, the pancreas still makes some insulin, but the cells in the body stop responding normally. Eventually, the pancreas cannot make enough insulin to keep up.  Having excess body weight or obesity increases a person's risk of developing type 2 diabetes. But people without excess body weight can get diabetes, too.  What are the symptoms of type 2 diabetes? -- Type 2 diabetes usually causes no symptoms. When symptoms do happen, they include:   Needing to urinate often   Intense thirst   Blurry vision  Can diabetes lead to other health problems? -- Yes. Type 2 diabetes might not make you feel sick. But if it is not managed, it can lead to serious problems over time, such as:   Heart attacks   Strokes   Kidney disease   Vision problems (or even blindness)   Pain or loss of feeling in the hands and feet   Needing to have fingers, toes, or other body parts removed (amputated)  How do I know if I have type 2 diabetes? -- Your doctor or nurse can do a blood test. There are 2 tests that can be used for this. Both involve measuring the amount of sugar in your blood, called your \"blood sugar\" or \"blood glucose\":   One of the tests measures your blood sugar at the time the blood sample is taken. This test is done in the " "morning. You can't eat or drink anything except water for at least 8 hours before the test.   The other test shows what your average blood sugar has been for the past 2 to 3 months. This blood test is called \"hemoglobin A1C\" or just \"A1C.\" It can be checked at any time of the day, even if you have recently eaten.  How is type 2 diabetes treated? -- The goals of treatment are to manage your blood sugar and lower the risk of future problems that can happen in people with diabetes.  Treatment might include:   Lifestyle changes - This is an important part of managing diabetes. It includes eating healthy foods and getting plenty of physical activity.   Medicines - There are a few medicines that help lower blood sugar. Some people need to take pills that help the body make more insulin or that help insulin do its job. Others need insulin shots.  Depending on what medicines you take, you might need to check your blood sugar regularly at home. But not everyone with type 2 diabetes needs to do this. Your doctor or nurse will tell you if you should be checking your blood sugar, and when and how to do this.  Sometimes, people with type 2 diabetes also need medicines to help prevent problems caused by the disease. For instance, medicines used to lower blood pressure can reduce the chances of a heart attack or stroke.   General medical care - It's also important to take care of other areas of your health. This includes watching your blood pressure and cholesterol levels. You should also get certain vaccines, such as vaccines to protect against the flu and coronavirus disease 2019 (\"COVID-19\"). Some people also need a vaccine to prevent pneumonia.  Can type 2 diabetes be prevented? -- Yes. To lower your chances of getting type 2 diabetes, the most important thing you can do is eat a healthy diet and get plenty of physical activity. This can help you lose weight if you are overweight. But eating well and being active are also good " for your overall health. Even gentle activity, like walking, has benefits.  If you smoke, quitting can also lower your risk of type 2 diabetes. Quitting smoking can be difficult, but your doctor or nurse can help.  All topics are updated as new evidence becomes available and our peer review process is complete.  This topic retrieved from [employer] on: Apr 24, 2024.  Topic 48879 Version 23.0  Release: 32.3.2 - C32.113  © 2024 UpToDate, Inc. and/or its affiliates. All rights reserved.  Consumer Information Use and Disclaimer   Disclaimer: This generalized information is a limited summary of diagnosis, treatment, and/or medication information. It is not meant to be comprehensive and should be used as a tool to help the user understand and/or assess potential diagnostic and treatment options. It does NOT include all information about conditions, treatments, medications, side effects, or risks that may apply to a specific patient. It is not intended to be medical advice or a substitute for the medical advice, diagnosis, or treatment of a health care provider based on the health care provider's examination and assessment of a patient's specific and unique circumstances. Patients must speak with a health care provider for complete information about their health, medical questions, and treatment options, including any risks or benefits regarding use of medications. This information does not endorse any treatments or medications as safe, effective, or approved for treating a specific patient. UpToDate, Inc. and its affiliates disclaim any warranty or liability relating to this information or the use thereof.The use of this information is governed by the Terms of Use, available at https://www.wolterskluwer.com/en/know/clinical-effectiveness-terms. 2024© UpToDate, Inc. and its affiliates and/or licensors. All rights reserved.  Copyright   © 2024 UpToDate, Inc. and/or its affiliates. All rights reserved.

## 2024-12-06 ENCOUNTER — OFFICE VISIT (OUTPATIENT)
Dept: INTERNAL MEDICINE CLINIC | Facility: CLINIC | Age: 79
End: 2024-12-06
Payer: COMMERCIAL

## 2024-12-06 VITALS
DIASTOLIC BLOOD PRESSURE: 60 MMHG | TEMPERATURE: 97.1 F | SYSTOLIC BLOOD PRESSURE: 136 MMHG | OXYGEN SATURATION: 98 % | WEIGHT: 193.2 LBS | HEART RATE: 70 BPM | BODY MASS INDEX: 36.5 KG/M2

## 2024-12-06 DIAGNOSIS — J02.9 SORE THROAT: Primary | ICD-10-CM

## 2024-12-06 LAB
SARS-COV-2 AG UPPER RESP QL IA: NEGATIVE
SL AMB POCT RAPID FLU A: NORMAL
SL AMB POCT RAPID FLU B: NORMAL
VALID CONTROL: NORMAL

## 2024-12-06 PROCEDURE — 87804 INFLUENZA ASSAY W/OPTIC: CPT | Performed by: INTERNAL MEDICINE

## 2024-12-06 PROCEDURE — 87811 SARS-COV-2 COVID19 W/OPTIC: CPT | Performed by: INTERNAL MEDICINE

## 2024-12-06 PROCEDURE — 99213 OFFICE O/P EST LOW 20 MIN: CPT | Performed by: INTERNAL MEDICINE

## 2024-12-06 PROCEDURE — G2211 COMPLEX E/M VISIT ADD ON: HCPCS | Performed by: INTERNAL MEDICINE

## 2024-12-06 RX ORDER — AMOXICILLIN 500 MG/1
500 CAPSULE ORAL EVERY 8 HOURS SCHEDULED
Qty: 21 CAPSULE | Refills: 0 | Status: SHIPPED | OUTPATIENT
Start: 2024-12-06 | End: 2024-12-13

## 2024-12-06 NOTE — PATIENT INSTRUCTIONS
Problem List Items Addressed This Visit          Surgery/Wound/Pain    Sore throat - Primary    Will check COVID test and flu test, if COVID and flu test negative, will treat with amoxicillin.  Patient can take symptomatic cold medications over-the-counter, follow-up if not improving    Orders:    Poct Covid 19 Rapid Antigen Test           Relevant Orders    Poct Covid 19 Rapid Antigen Test (Completed)    POCT rapid flu A and B (Completed)

## 2024-12-06 NOTE — ASSESSMENT & PLAN NOTE
Will check COVID test and flu test, if COVID and flu test negative, will treat with amoxicillin.  Patient can take symptomatic cold medications over-the-counter, follow-up if not improving.  COVID and flu negative, oxacillin sent    Orders:    Poct Covid 19 Rapid Antigen Test    POCT rapid flu A and B

## 2024-12-06 NOTE — PROGRESS NOTES
Name: Yajaira Marsh      : 1945      MRN: [id number]  Encounter Provider: Jamin Worrell MD  Encounter Date: 2024   Encounter department: [facility]    Assessment & Plan  Sore throat  Will check COVID test and flu test, if COVID and flu test negative, will treat with amoxicillin.  Patient can take symptomatic cold medications over-the-counter, follow-up if not improving.  COVID and flu negative, oxacillin sent    Orders:    Poct Covid 19 Rapid Antigen Test    POCT rapid flu A and B         History of Present Illness     Onset of feeling sick last night.        Review of Systems   Constitutional:  Positive for fatigue (mild). Negative for chills and fever.   HENT:  Positive for sore throat. Negative for congestion.    Respiratory:  Positive for cough. Negative for shortness of breath.    Musculoskeletal:  Negative for arthralgias and myalgias.   Neurological:  Positive for headaches (mild).     Past Medical History:   Diagnosis Date    Acute embolism and thrombosis of unspecified deep veins of unspecified lower extremity (HCC)     Last Assessed:  17    Anemia     Anosmia     Anxiety     Arthritis     Asthma     Back pain     Bilateral macular retinal edema     CAD (coronary artery disease)     Cataract     Cervical disc herniation     Cervical radiculopathy     Cervical spinal stenosis     Cervical spondylolysis     Chronic kidney disease     Chronic mastoiditis     Colon polyp     Complex endometrial hyperplasia     Depression     Diabetes mellitus (HCC)     Disease of thyroid gland     DVT (deep venous thrombosis) (HCC)     DVT (deep venous thrombosis) (HCC) 2017    Fibromyalgia     Fibromyalgia, primary     Hyperlipidemia     Hypertension     Hypothyroid     Kidney stone     Lumbar radiculopathy     Neck pain     Obese     PONV (postoperative nausea and vomiting)     Shingles 2021    Spinal stenosis     Stomach ulcer     Thyroid disease     Vascular  claudication (HCC) 2017     Past Surgical History:   Procedure Laterality Date    BACK SURGERY      CARPAL TUNNEL RELEASE      CATARACT EXTRACTION      CHOLECYSTECTOMY      COLONOSCOPY      CORONARY ANGIOPLASTY      CORONARY ARTERY BYPASS GRAFT      CYSTOSCOPY N/A 2017    Procedure: CYSTOSCOPY;  Surgeon: Tacho Arnold MD;  [facility];  Service: Gynecology Oncology    CYSTOSCOPY      PA LAPS TOTAL HYSTERECT 250 GM/< W/RMVL TUBE/OVARY N/A 2017    Procedure: ROBOTIC HYSTERECTOMY; BILATERAL SALPINO-OOPHERECTOMY; umbilical hernia repair.;  Surgeon: Tacho Arnold MD;  [facility];  Service: Gynecology Oncology    PA REPAIR FIRST ABDOMINAL WALL HERNIA N/A 2022    Procedure: REPAIR HERNIA INCISIONAL;  Surgeon: Horacio Scherer DO;  [facility];  Service: General    TONSILECTOMY AND ADNOIDECTOMY      TONSILLECTOMY      UMBILICAL HERNIA REPAIR       Family History   Problem Relation Age of Onset    Arthritis Mother     Leukemia Mother     Other Mother         Anxiety, major depressive disorder, recurrent episode with atypical features    Coronary artery disease Father         Heart problem    Diabetes Father     Other Father         Infectious disease    Alzheimer's disease Maternal Grandmother     Other Maternal Grandfather         Heart problem    Other Daughter         Anxiety, major depressive disorder, recurrent episode with atypical features    Alcohol abuse Other         Grandparent    Cancer Family     Diabetes Family     Hypertension Family     Other Family         Reported prior back trouble, thyroid disorder     Social History     Tobacco Use    Smoking status: Former     Current packs/day: 0.00     Average packs/day: 1 pack/day for 6.0 years (6.0 ttl pk-yrs)     Types: Cigarettes     Start date:      Quit date:      Years since quittin.9     Passive exposure: Never    Smokeless tobacco: Never    Tobacco comments:     Smoked about a half a pack  "for about 4 yrs.  Quite about 50 yrs ago.   Vaping Use    Vaping status: Never Used   Substance and Sexual Activity    Alcohol use: Never    Drug use: No    Sexual activity: Not Currently     Comment: No known STD risk factors     Current Outpatient Medications on File Prior to Visit   Medication Sig    ALPRAZolam (XANAX) 0.5 mg tablet Take 1 tablet (0.5 mg total) by mouth 2 (two) times a day as needed for anxiety    aspirin 81 MG tablet Take 81 mg by mouth daily    B-D UF III MINI PEN NEEDLES 31G X 5 MM MISC     BD Insulin Syringe U/F 31G X 5/16\" 0.5 ML MISC 4 (four) times a day As directed    cholecalciferol (VITAMIN D3) 1,000 units tablet Take 2,000 Units by mouth daily    Coenzyme Q10 (Co Q-10) 200 MG CAPS Take by mouth in the morning    Continuous Blood Gluc Sensor (Dexcom G6 Sensor) MISC Use 1 each daily at bedtime as needed (scalp itching)    Continuous Blood Gluc Sensor (Dexcom G7 Sensor) Use 1 Device    diltiazem (TIAZAC) 300 MG 24 hr capsule Take 1 capsule (300 mg total) by mouth daily Tiddylt    insulin aspart (NovoLOG FlexPen) 100 UNIT/ML injection pen Inject 18 Units under the skin 2 (two) times a day with meals    isosorbide mononitrate (IMDUR) 60 mg 24 hr tablet     levothyroxine 75 mcg tablet Take 75 mcg by mouth daily    rosuvastatin (CRESTOR) 20 MG tablet Take 1 tablet (20 mg total) by mouth every evening    torsemide (DEMADEX) 20 mg tablet TAKE 0.5 TABLETS (10 MG TOTAL) BY MOUTH AS NEEDED (TAKE UP TO 2 TIMES PER WEEK FOR LEG SWELLING AS NEEDED)    Toujeo SoloStar 300 units/mL CONCENTRATED U-300 injection pen (1-unit dial) Inject 62 Units under the skin in the morning    venlafaxine (EFFEXOR) 75 mg tablet Take 1 tablet (75 mg total) by mouth in the morning     Allergies   Allergen Reactions    Molds & Smuts Allergic Rhinitis    Other Allergic Rhinitis     RAGWEED, CAT DANDER, DOG DANDER     Pollen Extract Other (See Comments)     Cold symptoms       Immunization History   Administered Date(s) " Administered    COVID-19 MODERNA VACC 0.5 ML IM 02/03/2021, 03/03/2021, 12/21/2021    COVID-19 Moderna mRNA Vaccine 12 Yr+ 50 mcg/0.5 mL (Spikevax) 12/13/2023    INFLUENZA 11/04/2008, 11/01/2011    Influenza Split High Dose Preservative Free IM 10/07/2015, 12/01/2016, 10/02/2017, 10/05/2024    Influenza, high dose seasonal 0.7 mL 09/07/2018, 10/12/2019, 09/17/2020, 11/18/2021, 10/01/2022, 10/07/2023    Influenza, seasonal, injectable 10/20/2012, 10/05/2013, 09/16/2014    Pneumococcal Conjugate 13-Valent 12/17/2014    Pneumococcal Polysaccharide PPV23 01/18/2022    Tdap 08/24/2020     Objective   /60   Pulse 70   Temp (!) 97.1 °F (36.2 °C)   Wt 87.6 kg (193 lb 3.2 oz)   SpO2 98%   BMI 36.50 kg/m²     Physical Exam  Constitutional:       General: She is not in acute distress.  HENT:      Mouth/Throat:      Mouth: Mucous membranes are moist.      Pharynx: No oropharyngeal exudate.   Pulmonary:      Effort: Pulmonary effort is normal. No respiratory distress.      Breath sounds: Normal breath sounds. No stridor. No wheezing, rhonchi or rales.

## 2024-12-09 ENCOUNTER — TELEPHONE (OUTPATIENT)
Age: 79
End: 2024-12-09

## 2024-12-09 NOTE — TELEPHONE ENCOUNTER
Patient has been added to the Medication Management wait list with a referral.    Insurance: [employer]  Insurance Type:    Commercial [x]   Medicaid []   [facility] (if applicable)   Medicare []  Location Preference:   Provider Preference:   Virtual: Yes [] No [x]  Were outside resources sent: Yes [] No [x]

## 2024-12-10 ENCOUNTER — HOSPITAL ENCOUNTER (OUTPATIENT)
Dept: RADIOLOGY | Facility: HOSPITAL | Age: 79
Discharge: HOME | End: 2024-12-10
Attending: ORTHOPAEDIC SURGERY
Payer: COMMERCIAL

## 2024-12-10 ENCOUNTER — OFFICE VISIT (OUTPATIENT)
Dept: OBGYN CLINIC | Facility: HOSPITAL | Age: 79
End: 2024-12-10
Payer: COMMERCIAL

## 2024-12-10 VITALS — BODY MASS INDEX: 36.25 KG/M2 | HEIGHT: 61 IN | WEIGHT: 192 LBS

## 2024-12-10 DIAGNOSIS — G89.29 CHRONIC PAIN OF BOTH KNEES: Primary | ICD-10-CM

## 2024-12-10 DIAGNOSIS — G89.29 CHRONIC PAIN OF BOTH KNEES: ICD-10-CM

## 2024-12-10 DIAGNOSIS — M17.12 PRIMARY OSTEOARTHRITIS OF LEFT KNEE: ICD-10-CM

## 2024-12-10 DIAGNOSIS — M25.562 CHRONIC PAIN OF BOTH KNEES: Primary | ICD-10-CM

## 2024-12-10 DIAGNOSIS — M25.561 CHRONIC PAIN OF BOTH KNEES: ICD-10-CM

## 2024-12-10 DIAGNOSIS — M17.11 PRIMARY OSTEOARTHRITIS OF RIGHT KNEE: ICD-10-CM

## 2024-12-10 DIAGNOSIS — M25.562 CHRONIC PAIN OF BOTH KNEES: ICD-10-CM

## 2024-12-10 DIAGNOSIS — M25.561 CHRONIC PAIN OF BOTH KNEES: Primary | ICD-10-CM

## 2024-12-10 PROCEDURE — 20610 DRAIN/INJ JOINT/BURSA W/O US: CPT

## 2024-12-10 PROCEDURE — 73562 X-RAY EXAM OF KNEE 3: CPT

## 2024-12-10 PROCEDURE — 99214 OFFICE O/P EST MOD 30 MIN: CPT | Performed by: ORTHOPAEDIC SURGERY

## 2024-12-10 RX ORDER — LIDOCAINE HYDROCHLORIDE 10 MG/ML
4 INJECTION, SOLUTION INFILTRATION; PERINEURAL
Status: COMPLETED | OUTPATIENT
Start: 2024-12-10 | End: 2024-12-10

## 2024-12-10 RX ORDER — KETOROLAC TROMETHAMINE 30 MG/ML
60 INJECTION, SOLUTION INTRAMUSCULAR; INTRAVENOUS
Status: COMPLETED | OUTPATIENT
Start: 2024-12-10 | End: 2024-12-10

## 2024-12-10 RX ORDER — IRBESARTAN 300 MG/1
TABLET ORAL
COMMUNITY
Start: 2024-11-22

## 2024-12-10 RX ORDER — ERYTHROMYCIN 5 MG/G
OINTMENT OPHTHALMIC
COMMUNITY

## 2024-12-10 RX ORDER — TOBRAMYCIN AND DEXAMETHASONE 3; 1 MG/ML; MG/ML
SUSPENSION/ DROPS OPHTHALMIC
COMMUNITY

## 2024-12-10 RX ADMIN — KETOROLAC TROMETHAMINE 60 MG: 30 INJECTION, SOLUTION INTRAMUSCULAR; INTRAVENOUS at 14:30

## 2024-12-10 RX ADMIN — LIDOCAINE HYDROCHLORIDE 4 ML: 10 INJECTION, SOLUTION INFILTRATION; PERINEURAL at 14:30

## 2024-12-10 NOTE — PROGRESS NOTES
Assessment:   Diagnosis ICD-10-CM Associated Orders   1. Chronic pain of both knees  M25.561 XR knee 3 vw left non injury    M25.562 XR knee 3 vw right non injury    G89.29 Large joint arthrocentesis: R knee     Large joint arthrocentesis: L knee     lidocaine (XYLOCAINE) 1 % injection 4 mL     lidocaine (XYLOCAINE) 1 % injection 4 mL     ketorolac (TORADOL) 60 mg/2 mL IM injection 60 mg     ketorolac (TORADOL) 60 mg/2 mL IM injection 60 mg      2. Primary osteoarthritis of right knee  M17.11 Large joint arthrocentesis: R knee     lidocaine (XYLOCAINE) 1 % injection 4 mL     ketorolac (TORADOL) 60 mg/2 mL IM injection 60 mg      3. Primary osteoarthritis of left knee  M17.12 Large joint arthrocentesis: L knee     lidocaine (XYLOCAINE) 1 % injection 4 mL     ketorolac (TORADOL) 60 mg/2 mL IM injection 60 mg          Plan:  78 y.o. female with known osteoarthritis of bilateral knees  X-rays taken and examined today indicate significant osteoarthritis of bilateral knees with varus alignment  Treated in the past with injections of viscosupplementation, which patient admits provided little to no pain relief  Patient is candidate for elective total knee arthroplasty however due to current A1c of 10.8 she is not eligible at this time  Offered, accepted, provided with injections of Toradol to bilateral knees today  Patient tolerated procedures well  Continue weightbearing activities as tolerated  Patient should follow-up as needed    The above stated was discussed in layman's terms and the patient expressed understanding.  All questions were answered to the patient's satisfaction.     To do next visit:  The patient can follow up as needed and is welcome to return at any point with any new or old issue.       Subjective:   Yajaira Marsh is a 78 y.o. female who presents for reevaluation of bilateral knees.  Patient has known osteoarthritis of bilateral knees which has been treated in the past with intermittent injections  of viscosupplementation.  Patient was last seen over 2 years ago, and was provided with Euflexxa to bilateral knees which she admits provided minimal symptomatic relief.  Patient admits to using a cane to assist in ambulation in the past however she unfortunately lost her cane.  X-rays taken and examined today indicating significant osteoarthritis of bilateral knees with varus alignment.  Patient is a candidate for total knee arthroplasty however current A1c sits above 10.  Patient was offered, accepted, provided with injections of Toradol to bilateral knees today which she tolerated well.  Patient was advised to follow-up as needed.  Pain score: 7/10    Review of systems negative unless otherwise specified in HPI    Past Medical History:   Diagnosis Date   • Acute embolism and thrombosis of unspecified deep veins of unspecified lower extremity (HCC)     Last Assessed:  5/18/17   • Anemia    • Anosmia    • Anxiety    • Arthritis    • Asthma    • Back pain    • Bilateral macular retinal edema    • CAD (coronary artery disease)    • Cataract    • Cervical disc herniation    • Cervical radiculopathy    • Cervical spinal stenosis    • Cervical spondylolysis    • Chronic kidney disease    • Chronic mastoiditis    • Colon polyp    • Complex endometrial hyperplasia    • Depression    • Diabetes mellitus (HCC)    • Disease of thyroid gland    • DVT (deep venous thrombosis) ([facility])    • DVT (deep venous thrombosis) ([facility]) 06/16/2017   • Fibromyalgia    • Fibromyalgia, primary    • Hyperlipidemia    • Hypertension    • Hypothyroid    • Kidney stone    • Lumbar radiculopathy    • Neck pain    • Obese    • PONV (postoperative nausea and vomiting)    • Shingles 07/01/2021   • Spinal stenosis    • Stomach ulcer    • Thyroid disease    • Vascular claudication ([facility]) 12/11/2017       Past Surgical History:   Procedure Laterality Date   • BACK SURGERY     • CARPAL TUNNEL RELEASE     • CATARACT EXTRACTION     • CHOLECYSTECTOMY     •  COLONOSCOPY     • CORONARY ANGIOPLASTY     • CORONARY ARTERY BYPASS GRAFT     • CYSTOSCOPY N/A 2017    Procedure: CYSTOSCOPY;  Surgeon: Tacho Arnold MD;  [facility];  Service: Gynecology Oncology   • CYSTOSCOPY     • AR LAPS TOTAL HYSTERECT 250 GM/< W/RMVL TUBE/OVARY N/A 2017    Procedure: ROBOTIC HYSTERECTOMY; BILATERAL SALPINO-OOPHERECTOMY; umbilical hernia repair.;  Surgeon: Tacho Arnold MD;  [facility];  Service: Gynecology Oncology   • AR REPAIR FIRST ABDOMINAL WALL HERNIA N/A 2022    Procedure: REPAIR HERNIA INCISIONAL;  Surgeon: Horacio Scherer DO;  [facility];  Service: General   • TONSILECTOMY AND ADNOIDECTOMY     • TONSILLECTOMY     • UMBILICAL HERNIA REPAIR         Family History   Problem Relation Age of Onset   • Arthritis Mother    • Leukemia Mother    • Other Mother         Anxiety, major depressive disorder, recurrent episode with atypical features   • Coronary artery disease Father         Heart problem   • Diabetes Father    • Other Father         Infectious disease   • Alzheimer's disease Maternal Grandmother    • Other Maternal Grandfather         Heart problem   • Other Daughter         Anxiety, major depressive disorder, recurrent episode with atypical features   • Alcohol abuse Other         Grandparent   • Cancer Family    • Diabetes Family    • Hypertension Family    • Other Family         Reported prior back trouble, thyroid disorder       Social History     Occupational History   • Occupation: Retired   Tobacco Use   • Smoking status: Former     Current packs/day: 0.00     Average packs/day: 1 pack/day for 6.0 years (6.0 ttl pk-yrs)     Types: Cigarettes     Start date:      Quit date:      Years since quittin.9     Passive exposure: Never   • Smokeless tobacco: Never   • Tobacco comments:     Smoked about a half a pack for about 4 yrs.  Quite about 50 yrs ago.   Vaping Use   • Vaping status: Never Used   Substance and  "Sexual Activity   • Alcohol use: Never   • Drug use: No   • Sexual activity: Not Currently     Comment: No known STD risk factors         Current Outpatient Medications:   •  irbesartan (AVAPRO) 300 mg tablet, take 1 tablet by mouth every day at night, Disp: , Rfl:   •  ALPRAZolam (XANAX) 0.5 mg tablet, Take 1 tablet (0.5 mg total) by mouth 2 (two) times a day as needed for anxiety, Disp: 60 tablet, Rfl: 0  •  amoxicillin (AMOXIL) 500 mg capsule, Take 1 capsule (500 mg total) by mouth every 8 (eight) hours for 7 days, Disp: 21 capsule, Rfl: 0  •  aspirin 81 MG tablet, Take 81 mg by mouth daily, Disp: , Rfl:   •  B-D UF III MINI PEN NEEDLES 31G X 5 MM MISC, , Disp: , Rfl:   •  BD Insulin Syringe U/F 31G X 5/16\" 0.5 ML MISC, 4 (four) times a day As directed, Disp: , Rfl:   •  cholecalciferol (VITAMIN D3) 1,000 units tablet, Take 2,000 Units by mouth daily, Disp: , Rfl:   •  Coenzyme Q10 (Co Q-10) 200 MG CAPS, Take by mouth in the morning, Disp: , Rfl:   •  Continuous Blood Gluc Sensor (Dexcom G6 Sensor) MISC, Use 1 each daily at bedtime as needed (scalp itching), Disp: 1 each, Rfl: 0  •  Continuous Blood Gluc Sensor (Dexcom G7 Sensor), Use 1 Device, Disp: , Rfl:   •  diltiazem (TIAZAC) 300 MG 24 hr capsule, Take 1 capsule (300 mg total) by mouth daily Tiddylt, Disp: 90 capsule, Rfl: 0  •  erythromycin (ILOTYCIN) ophthalmic ointment, ADMINISTER 1/2 INCH TO BOTH EYES DAILY AT BEDTIME, Disp: , Rfl:   •  insulin aspart (NovoLOG FlexPen) 100 UNIT/ML injection pen, Inject 18 Units under the skin 2 (two) times a day with meals, Disp: , Rfl:   •  isosorbide mononitrate (IMDUR) 60 mg 24 hr tablet, , Disp: , Rfl:   •  levothyroxine 75 mcg tablet, Take 75 mcg by mouth daily, Disp: , Rfl:   •  rosuvastatin (CRESTOR) 20 MG tablet, Take 1 tablet (20 mg total) by mouth every evening, Disp: 90 tablet, Rfl: 1  •  tobramycin-dexamethasone (TOBRADEX) ophthalmic suspension, ADMINISTER 1 DROP TO THE RIGHT EYE EVERY 4 (FOUR) HOURS WHILE " AWAKE, Disp: , Rfl:   •  torsemide (DEMADEX) 20 mg tablet, TAKE 0.5 TABLETS (10 MG TOTAL) BY MOUTH AS NEEDED (TAKE UP TO 2 TIMES PER WEEK FOR LEG SWELLING AS NEEDED), Disp: 45 tablet, Rfl: 1  •  Toujeo SoloStar 300 units/mL CONCENTRATED U-300 injection pen (1-unit dial), Inject 62 Units under the skin in the morning, Disp: , Rfl:   •  venlafaxine (EFFEXOR) 75 mg tablet, Take 1 tablet (75 mg total) by mouth in the morning, Disp: 30 tablet, Rfl: 0  No current facility-administered medications for this visit.    Allergies   Allergen Reactions   • Molds & Smuts Allergic Rhinitis   • Other Allergic Rhinitis     RAGWEED, CAT DANDER, DOG DANDER    • Pollen Extract Other (See Comments)     Cold symptoms            There were no vitals filed for this visit.    Objective:  Physical exam  General: Awake, Alert, Oriented  Eyes: Pupils equal, round and reactive to light  Heart: regular rate and rhythm  Lungs: No audible wheezing  Abdomen: soft                    Right Knee Exam     Tenderness   The patient is experiencing tenderness in the lateral joint line and medial joint line.    Range of Motion   Extension:  5   Flexion:  100 (With crepitation and stiffness)     Other   Erythema: absent  Scars: absent  Swelling: mild  Effusion: no effusion present    Comments:  Varus alignment      Left Knee Exam     Tenderness   The patient is experiencing tenderness in the lateral joint line and medial joint line.    Range of Motion   Extension:  5   Flexion:  100 (With crepitation and stiffness)     Other   Erythema: absent  Scars: absent  Swelling: mild  Effusion: no effusion present    Comments:  Varus alignment            Diagnostics, reviewed and taken today if performed as documented:    Right knee x-ray:  - Significant tricompartmental osteoarthritis with degenerative changes   - Varus alignment with bone-on-bone articulation medially  - Subchondral sclerosis and multiple osteophytes noted      Left knee x-ray:  - Significant  "tricompartmental osteoarthritis with degenerative changes   - Varus alignment with bone-on-bone articulation medially  - Subchondral sclerosis and multiple osteophytes noted        Procedures, if performed today:    Large joint arthrocentesis: R knee  Universal Protocol:  Consent: Verbal consent obtained.  Risks and benefits: risks, benefits and alternatives were discussed  Consent given by: patient  Site marked: the operative site was marked  Supporting Documentation  Indications: pain   Procedure Details  Location: knee - R knee  Needle size: 22 G  Approach: anterolateral  Medications administered: 4 mL lidocaine 1 %; 60 mg ketorolac 60 mg/2 mL    Patient tolerance: patient tolerated the procedure well with no immediate complications  Dressing:  Sterile dressing applied      Large joint arthrocentesis: L knee  Universal Protocol:  Consent: Verbal consent obtained.  Risks and benefits: risks, benefits and alternatives were discussed  Consent given by: patient  Site marked: the operative site was marked  Supporting Documentation  Indications: pain   Procedure Details  Location: knee - L knee  Needle size: 22 G  Approach: anterolateral  Medications administered: 4 mL lidocaine 1 %; 60 mg ketorolac 60 mg/2 mL    Patient tolerance: patient tolerated the procedure well with no immediate complications  Dressing:  Sterile dressing applied            Portions of the record may have been created with voice recognition software.  Occasional wrong word or \"sound a like\" substitutions may have occurred due to the inherent limitations of voice recognition software.  Read the chart carefully and recognize, using context, where substitutions have occurred.      "

## 2024-12-11 ENCOUNTER — OFFICE VISIT (OUTPATIENT)
Dept: GASTROENTEROLOGY | Facility: AMBULARY SURGERY CENTER | Age: 79
End: 2024-12-11
Payer: COMMERCIAL

## 2024-12-11 ENCOUNTER — PREP FOR PROCEDURE (OUTPATIENT)
Dept: GASTROENTEROLOGY | Facility: AMBULARY SURGERY CENTER | Age: 79
End: 2024-12-11

## 2024-12-11 VITALS
OXYGEN SATURATION: 97 % | BODY MASS INDEX: 36.85 KG/M2 | HEART RATE: 68 BPM | HEIGHT: 61 IN | SYSTOLIC BLOOD PRESSURE: 148 MMHG | WEIGHT: 195.2 LBS | DIASTOLIC BLOOD PRESSURE: 70 MMHG

## 2024-12-11 DIAGNOSIS — Z86.0100 HISTORY OF COLON POLYPS: ICD-10-CM

## 2024-12-11 DIAGNOSIS — R19.7 DIARRHEA, UNSPECIFIED TYPE: Primary | ICD-10-CM

## 2024-12-11 DIAGNOSIS — R14.0 BLOATING: ICD-10-CM

## 2024-12-11 PROCEDURE — 99204 OFFICE O/P NEW MOD 45 MIN: CPT | Performed by: INTERNAL MEDICINE

## 2024-12-11 RX ORDER — SODIUM CHLORIDE, SODIUM LACTATE, POTASSIUM CHLORIDE, CALCIUM CHLORIDE 600; 310; 30; 20 MG/100ML; MG/100ML; MG/100ML; MG/100ML
125 INJECTION, SOLUTION INTRAVENOUS CONTINUOUS
OUTPATIENT
Start: 2024-12-11

## 2024-12-11 RX ORDER — BISACODYL 5 MG/1
10 TABLET, DELAYED RELEASE ORAL ONCE
Qty: 2 TABLET | Refills: 0 | Status: SHIPPED | OUTPATIENT
Start: 2024-12-11 | End: 2024-12-11

## 2024-12-11 NOTE — ASSESSMENT & PLAN NOTE
Patient reports having episodes of fecal urgency with loose to mushy bowel movements which appear to be most likely diet related.  Rule out pancreatic insufficiency.    -Check stool for pancreatic elastase and fecal fat    -Advised to continue to avoid honey barbecue sauce which appears to be causing some of the symptoms.    -May try to avoid milk and dairy products    -Schedule for colonoscopy.    -High-fiber diet.    -Patient was given instructions about the colonoscopy prep.    -Patient was explained about the risks and benefits of the procedure. Risks including but not limited to bleeding, infection, perforation were explained in detail. Also explained about less than 100% sensitivity with the exam and other alternatives.

## 2024-12-11 NOTE — PROGRESS NOTES
Consultation -  [facility]  Yajaira Marsh 1945 female         ASSESSMENT & PLAN:    Diarrhea  Patient reports having episodes of fecal urgency with loose to mushy bowel movements which appear to be most likely diet related.  Rule out pancreatic insufficiency.    -Check stool for pancreatic elastase and fecal fat    -Advised to continue to avoid honey barbecue sauce which appears to be causing some of the symptoms.    -May try to avoid milk and dairy products    -Schedule for colonoscopy.    -High-fiber diet.    -Patient was given instructions about the colonoscopy prep.    -Patient was explained about the risks and benefits of the procedure. Risks including but not limited to bleeding, infection, perforation were explained in detail. Also explained about less than 100% sensitivity with the exam and other alternatives.    History of colon polyps  Personal history of colon polyps- patient is at increased risk for colon cancer screening.  Rule out colorectal lesions including polyps or malignancy.    -Colonoscopy      Chief Complaint: Diarrhea, fecal urgency    HPI: 78-year-old female with history of diabetes mellitus, hypothyroidism, hyperlipidemia, anxiety, hypertension reports having occasional episodes of fecal urgency with mushy stool.  She tried to stop eating chicken with honey barbecue sauce with some improvement of symptoms.  Good appetite, no recent weight loss.  She has history of colon polyps and had last colonoscopy in 2017.  She was advised to come back in 3 years at that time.  No abdominal pain, nausea or vomiting.  Denies any heartburn or acid reflux.  Denies any difficulty swallowing.    Chaperon: Ms. Rip    REVIEW OF SYSTEMS: Review of Systems   Constitutional:  Negative for activity change, appetite change, chills, diaphoresis, fatigue, fever and unexpected weight change.   HENT:  Negative for ear discharge, ear pain, facial swelling, hearing loss, nosebleeds, sore  throat, tinnitus and voice change.    Eyes:  Negative for pain, discharge, redness, itching and visual disturbance.   Respiratory:  Negative for apnea, cough, chest tightness, shortness of breath and wheezing.    Cardiovascular:  Negative for chest pain and palpitations.   Gastrointestinal:         As noted in HPI   Endocrine: Negative for cold intolerance, heat intolerance and polyuria.   Genitourinary:  Negative for difficulty urinating, dysuria, flank pain, hematuria and urgency.   Musculoskeletal:  Positive for arthralgias. Negative for back pain, gait problem, joint swelling and myalgias.   Skin:  Negative for rash and wound.   Neurological:  Negative for dizziness, tremors, seizures, speech difficulty, light-headedness, numbness and headaches.   Hematological:  Negative for adenopathy. Does not bruise/bleed easily.   Psychiatric/Behavioral:  Negative for agitation, behavioral problems and confusion. The patient is not nervous/anxious.         Past Medical History:   Diagnosis Date    Acute embolism and thrombosis of unspecified deep veins of unspecified lower extremity (HCC)     Last Assessed:  5/18/17    Anemia     Anosmia     Anxiety     Arthritis     Asthma     Back pain     Bilateral macular retinal edema     CAD (coronary artery disease)     Cataract     Cervical disc herniation     Cervical radiculopathy     Cervical spinal stenosis     Cervical spondylolysis     Chronic kidney disease     Chronic mastoiditis     Colon polyp     Complex endometrial hyperplasia     Depression     Diabetes mellitus (HCC)     Disease of thyroid gland     DVT (deep venous thrombosis) ([facility])     DVT (deep venous thrombosis) ([facility]) 06/16/2017    Fibromyalgia     Fibromyalgia, primary     Hyperlipidemia     Hypertension     Hypothyroid     Kidney stone     Lumbar radiculopathy     Neck pain     Obese     PONV (postoperative nausea and vomiting)     Shingles 07/01/2021    Spinal stenosis     Stomach ulcer     Thyroid disease      Vascular claudication (HCC) 2017      Past Surgical History:   Procedure Laterality Date    BACK SURGERY      CARPAL TUNNEL RELEASE      CATARACT EXTRACTION      CHOLECYSTECTOMY      COLONOSCOPY      CORONARY ANGIOPLASTY      CORONARY ARTERY BYPASS GRAFT      CYSTOSCOPY N/A 2017    Procedure: CYSTOSCOPY;  Surgeon: Tacho Arnold MD;  Location: [facility];  Service: Gynecology Oncology    CYSTOSCOPY      RI LAPS TOTAL HYSTERECT 250 GM/< W/RMVL TUBE/OVARY N/A 2017    Procedure: ROBOTIC HYSTERECTOMY; BILATERAL SALPINO-OOPHERECTOMY; umbilical hernia repair.;  Surgeon: Tacho Arnold MD;  Location: [facility];  Service: Gynecology Oncology    RI REPAIR FIRST ABDOMINAL WALL HERNIA N/A 2022    Procedure: REPAIR HERNIA INCISIONAL;  Surgeon: Horacio Scherer DO;  Location: [facility];  Service: General    TONSILECTOMY AND ADNOIDECTOMY      TONSILLECTOMY      UMBILICAL HERNIA REPAIR       Social History     Socioeconomic History    Marital status: /Civil Union     Spouse name: Not on file    Number of children: 2    Years of education: High school or GED    Highest education level: Not on file   Occupational History    Occupation: Retired   Tobacco Use    Smoking status: Former     Current packs/day: 0.00     Average packs/day: 1 pack/day for 6.0 years (6.0 ttl pk-yrs)     Types: Cigarettes     Start date:      Quit date:      Years since quittin.9     Passive exposure: Never    Smokeless tobacco: Never    Tobacco comments:     Smoked about a half a pack for about 4 yrs.  Quite about 50 yrs ago.   Vaping Use    Vaping status: Never Used   Substance and Sexual Activity    Alcohol use: Never    Drug use: No    Sexual activity: Not Currently     Comment: No known STD risk factors   Other Topics Concern    Not on file   Social History Narrative    Lives independently with spouse    No known risk factors    Denied:  Exercising regularly     Social Drivers of Health     Financial  Resource Strain: Low Risk  (2024)    Overall Financial Resource Strain (CARDIA)     Difficulty of Paying Living Expenses: Not hard at all   Food Insecurity: No Food Insecurity (2024)    Nursing - Inadequate Food Risk Classification     Worried About Running Out of Food in the Last Year: Not on file     Ran Out of Food in the Last Year: Not on file     Ran Out of Food in the Last Year: 1   Transportation Needs: No Transportation Needs (2024)    Nursing - Transportation Risk Classification     Lack of Transportation: Not on file     Lack of Transportation: 2   Physical Activity: Not on file   Stress: Not on file   Social Connections: Not on file   Intimate Partner Violence: Unknown (2024)    Nursing IPS     Feels Physically and Emotionally Safe: Not on file     Physically Hurt by Someone: Not on file     Humiliated or Emotionally Abused by Someone: Not on file     Physically Hurt by Someone: 2     Hurt or Threatened by Someone: 2   Housing Stability: Unknown (2024)    Nursing: Inadequate Housing Risk Classification     Has Housing: Not on file     Worried About Losing Housing: Not on file     Unable to Get Utilities: Not on file     Unable to Pay for Housing in the Last Year: 2     Has Housin     Family History   Problem Relation Age of Onset    Arthritis Mother     Leukemia Mother     Other Mother         Anxiety, major depressive disorder, recurrent episode with atypical features    Coronary artery disease Father         Heart problem    Diabetes Father     Other Father         Infectious disease    Alzheimer's disease Maternal Grandmother     Other Maternal Grandfather         Heart problem    Other Daughter         Anxiety, major depressive disorder, recurrent episode with atypical features    Alcohol abuse Other         Grandparent    Cancer Family     Diabetes Family     Hypertension Family     Other Family         Reported prior back trouble, thyroid disorder     Molds & smuts,  "Other, and Pollen extract  Current Outpatient Medications   Medication Sig Dispense Refill    ALPRAZolam (XANAX) 0.5 mg tablet Take 1 tablet (0.5 mg total) by mouth 2 (two) times a day as needed for anxiety 60 tablet 0    amoxicillin (AMOXIL) 500 mg capsule Take 1 capsule (500 mg total) by mouth every 8 (eight) hours for 7 days 21 capsule 0    aspirin 81 MG tablet Take 81 mg by mouth daily      B-D UF III MINI PEN NEEDLES 31G X 5 MM MISC       BD Insulin Syringe U/F 31G X 5/16\" 0.5 ML MISC 4 (four) times a day As directed      bisacodyl (DULCOLAX) 5 mg EC tablet Take 2 tablets (10 mg total) by mouth once for 1 dose 2 tablet 0    cholecalciferol (VITAMIN D3) 1,000 units tablet Take 2,000 Units by mouth daily      Coenzyme Q10 (Co Q-10) 200 MG CAPS Take by mouth in the morning      Continuous Blood Gluc Sensor (Dexcom G6 Sensor) MISC Use 1 each daily at bedtime as needed (scalp itching) 1 each 0    Continuous Blood Gluc Sensor (Dexcom G7 Sensor) Use 1 Device      diltiazem (TIAZAC) 300 MG 24 hr capsule Take 1 capsule (300 mg total) by mouth daily Tiddylt 90 capsule 0    insulin aspart (NovoLOG FlexPen) 100 UNIT/ML injection pen Inject 18 Units under the skin 2 (two) times a day with meals      irbesartan (AVAPRO) 300 mg tablet take 1 tablet by mouth every day at night      isosorbide mononitrate (IMDUR) 60 mg 24 hr tablet       levothyroxine 75 mcg tablet Take 75 mcg by mouth daily      polyethylene glycol (GOLYTELY) 4000 mL solution Take 4,000 mL by mouth once for 1 dose 4000 mL 0    rosuvastatin (CRESTOR) 20 MG tablet Take 1 tablet (20 mg total) by mouth every evening 90 tablet 1    torsemide (DEMADEX) 20 mg tablet TAKE 0.5 TABLETS (10 MG TOTAL) BY MOUTH AS NEEDED (TAKE UP TO 2 TIMES PER WEEK FOR LEG SWELLING AS NEEDED) 45 tablet 1    Toujeo SoloStar 300 units/mL CONCENTRATED U-300 injection pen (1-unit dial) Inject 62 Units under the skin in the morning      venlafaxine (EFFEXOR) 75 mg tablet Take 1 tablet (75 mg " "total) by mouth in the morning 30 tablet 0    erythromycin (ILOTYCIN) ophthalmic ointment ADMINISTER 1/2 INCH TO BOTH EYES DAILY AT BEDTIME (Patient not taking: Reported on 12/11/2024)      tobramycin-dexamethasone (TOBRADEX) ophthalmic suspension ADMINISTER 1 DROP TO THE RIGHT EYE EVERY 4 (FOUR) HOURS WHILE AWAKE (Patient not taking: Reported on 12/11/2024)       No current facility-administered medications for this visit.       Blood pressure 148/70, pulse 68, height 5' 1\" (1.549 m), weight 88.5 kg (195 lb 3.2 oz), SpO2 97%.    PHYSICAL EXAM: Physical Exam  Constitutional:       Appearance: Normal appearance. She is well-developed.   HENT:      Head: Normocephalic and atraumatic.      Nose: Nose normal.   Eyes:      Conjunctiva/sclera: Conjunctivae normal.   Neck:      Thyroid: No thyromegaly.      Vascular: No JVD.      Trachea: No tracheal deviation.   Cardiovascular:      Rate and Rhythm: Normal rate and regular rhythm.      Heart sounds: Normal heart sounds. No murmur heard.     No friction rub. No gallop.   Pulmonary:      Effort: Pulmonary effort is normal. No respiratory distress.      Breath sounds: Normal breath sounds. No wheezing or rales.   Abdominal:      General: Bowel sounds are normal. There is no distension.      Palpations: Abdomen is soft. There is no mass.      Tenderness: There is no abdominal tenderness. There is no guarding.      Hernia: No hernia is present.   Musculoskeletal:         General: No tenderness or deformity.      Cervical back: Neck supple.      Right lower leg: No edema.      Left lower leg: No edema.   Lymphadenopathy:      Cervical: No cervical adenopathy.   Skin:     General: Skin is warm and dry.      Findings: No erythema or rash.   Neurological:      Mental Status: She is alert and oriented to person, place, and time.   Psychiatric:         Mood and Affect: Mood normal.         Behavior: Behavior normal.         Thought Content: Thought content normal.          Lab " Results   Component Value Date    WBC 7.92 11/04/2024    HGB 10.1 (L) 11/04/2024    HCT 31.7 (L) 11/04/2024    MCV 90 11/04/2024     11/04/2024     Lab Results   Component Value Date    GLUCOSE 248 (H) 11/09/2015    CALCIUM 8.7 11/04/2024     11/16/2017    K 4.3 11/04/2024    CO2 24 11/04/2024     (H) 11/04/2024    BUN 18 11/04/2024    CREATININE 1.50 (H) 11/04/2024     Lab Results   Component Value Date    ALT 15 11/03/2024    AST 21 11/03/2024    ALKPHOS 92 11/03/2024    BILITOT 0.5 11/16/2017     Lab Results   Component Value Date    INR 0.96 04/24/2018    INR 1.11 04/19/2017    INR 1.09 03/02/2017    PROTIME 13.1 04/24/2018    PROTIME 14.1 04/19/2017    PROTIME 13.9 03/02/2017       No results found.

## 2024-12-12 DIAGNOSIS — F41.9 ANXIETY: ICD-10-CM

## 2024-12-12 DIAGNOSIS — F41.0 PANIC ATTACKS: ICD-10-CM

## 2024-12-12 DIAGNOSIS — F41.1 GAD (GENERALIZED ANXIETY DISORDER): ICD-10-CM

## 2024-12-12 RX ORDER — ALPRAZOLAM 0.5 MG
0.5 TABLET ORAL 2 TIMES DAILY PRN
Qty: 60 TABLET | Refills: 0 | Status: SHIPPED | OUTPATIENT
Start: 2024-12-12

## 2024-12-12 NOTE — TELEPHONE ENCOUNTER
Reason for call:   [x] Refill   [] Prior Auth  [] Other:     Office:   [x] PCP/Provider -   [] Specialty/Provider -     Medication: ALPRAZolam (XANAX) 0.5 mg tablet     Dose/Frequency: Take 1 tablet (0.5 mg total) by mouth 2 (two) times a day as needed for anxiety,     Quantity: 60 tab    Pharmacy: [facility]/pharmacy #[id number] [facility] [place] - [address]     Does the patient have enough for 3 days?   [] Yes   [x] No - Send as HP to POD

## 2025-01-04 ENCOUNTER — HOSPITAL ENCOUNTER (EMERGENCY)
Facility: HOSPITAL | Age: 80
Discharge: HOME | End: 2025-01-04
Attending: EMERGENCY MEDICINE
Payer: COMMERCIAL

## 2025-01-04 ENCOUNTER — APPOINTMENT (OUTPATIENT)
Dept: RADIOLOGY | Facility: HOSPITAL | Age: 80
End: 2025-01-04
Payer: COMMERCIAL

## 2025-01-04 VITALS
TEMPERATURE: 98 F | DIASTOLIC BLOOD PRESSURE: 82 MMHG | OXYGEN SATURATION: 98 % | SYSTOLIC BLOOD PRESSURE: 192 MMHG | RESPIRATION RATE: 18 BRPM | HEART RATE: 76 BPM

## 2025-01-04 DIAGNOSIS — S46.001A INJURY OF RIGHT ROTATOR CUFF, INITIAL ENCOUNTER: Primary | ICD-10-CM

## 2025-01-04 PROCEDURE — 99283 EMERGENCY DEPT VISIT LOW MDM: CPT

## 2025-01-04 PROCEDURE — 99284 EMERGENCY DEPT VISIT MOD MDM: CPT | Performed by: EMERGENCY MEDICINE

## 2025-01-04 PROCEDURE — 73030 X-RAY EXAM OF SHOULDER: CPT

## 2025-01-04 NOTE — ED PROVIDER NOTES
Time reflects when diagnosis was documented in both MDM as applicable and the Disposition within this note       Time User Action Codes Description Comment    1/4/2025  3:38 PM Bereket Odonnellgunnerrobert Add [S46.001A] Injury of right rotator cuff, initial encounter           ED Disposition       ED Disposition   Discharge    Condition   Stable    Date/Time   Sat Jan 4, 2025  3:38 PM    Comment   Yajaira Charbelalfonzokatrina discharge to home/self care.                   Assessment & Plan       Medical Decision Making  79-year-old female presents with right shoulder pain after mechanical fall yesterday, did not hit her head, she will be evaluated for possible right shoulder fracture or dislocation, if her imaging is unremarkable she will be discharged home with a right rotator cuff injury and a sling.    The patient's imaging was unremarkable aside from some possible evidence of a rotator cuff injury, she will be placed in a sling, and will be instructed to follow-up with orthopedic surgery as an outpatient.  Her chart will also be sent to the ambulatory referral center for orthopedic surgery scheduling.  Return indications and follow-up instructions given.  She has been encouraged to use over-the-counter analgesia.    Disposition: Patient stable for outpatient management. Discussed need for follow up with their primary doctor or specialist to review all results, including incidental findings as below. Patient discharged with explanation of ED workup and diagnosis, instructions on how to obtain outpatient follow up, care instructions at home, and strict return precautions if patient develops new or worsening symptoms. Patients questions answered and agreeable with discharge plan.        PLEASE NOTE:  This encounter was completed utilizing the [employer] Direct Speech Voice Recognition Software. Grammatical errors, random word insertions, pronoun errors and incomplete sentences are occasional inherent consequences of the system  due to software limitations, ambient noise and hardware issues.These may be missed by proof reading prior to affixing electronic signature. Any questions or concerns about the content, text or information contained within the body of this dictation should be directly addressed to the physician for clarification. Please do not hesitate to call me directly if you have any questions or concerns.      Amount and/or Complexity of Data Reviewed  Radiology: ordered.        ED Course as of 01/04/25 1549   Sat Jan 04, 2025   1537 My wet read of the patient's x-ray of the right shoulder shows laxity of the humerus in the glenohumeral joint, consistent with a possible rotator cuff injury, but no dislocation or fracture or other pathology.       Medications - No data to display    ED Risk Strat Scores                          SBIRT 22yo+      Flowsheet Row Most Recent Value   Initial Alcohol Screen: US AUDIT-C     1. How often do you have a drink containing alcohol? 0 Filed at: 01/04/2025 1155   2. How many drinks containing alcohol do you have on a typical day you are drinking?  0 Filed at: 01/04/2025 1154   3a. Male UNDER 65: How often do you have five or more drinks on one occasion? 0 Filed at: 01/04/2025 1155   3b. FEMALE Any Age, or MALE 65+: How often do you have 4 or more drinks on one occassion? 0 Filed at: 01/04/2025 1159   Audit-C Score 0 Filed at: 01/04/2025 1154   GERMAN: How many times in the past year have you...    Used an illegal drug or used a prescription medication for non-medical reasons? Never Filed at: 01/04/2025 1150                            History of Present Illness       Chief Complaint   Patient presents with    Fall     Pt reports falling last night around 2000, c/o 9/10 R shoulder pain. (-)HS/ASA/thinners.       Past Medical History:   Diagnosis Date    Acute embolism and thrombosis of unspecified deep veins of unspecified lower extremity (HCC)     Last Assessed:  5/18/17    Anemia     Anosmia      Anxiety     Arthritis     Asthma     Back pain     Bilateral macular retinal edema     CAD (coronary artery disease)     Cataract     Cervical disc herniation     Cervical radiculopathy     Cervical spinal stenosis     Cervical spondylolysis     Chronic kidney disease     Chronic mastoiditis     Colon polyp     Complex endometrial hyperplasia     Depression     Diabetes mellitus (HCC)     Disease of thyroid gland     DVT (deep venous thrombosis) ([facility])     DVT (deep venous thrombosis) ([facility]) 06/16/2017    Fibromyalgia     Fibromyalgia, primary     Hyperlipidemia     Hypertension     Hypothyroid     Kidney stone     Lumbar radiculopathy     Neck pain     Obese     PONV (postoperative nausea and vomiting)     Shingles 07/01/2021    Spinal stenosis     Stomach ulcer     Thyroid disease     Vascular claudication ([facility]) 12/11/2017      Past Surgical History:   Procedure Laterality Date    BACK SURGERY      CARPAL TUNNEL RELEASE      CATARACT EXTRACTION      CHOLECYSTECTOMY      COLONOSCOPY      CORONARY ANGIOPLASTY      CORONARY ARTERY BYPASS GRAFT      CYSTOSCOPY N/A 6/20/2017    Procedure: CYSTOSCOPY;  Surgeon: Tacho Arnold MD;  Location: [facility];  Service: Gynecology Oncology    CYSTOSCOPY      ND LAPS TOTAL HYSTERECT 250 GM/< W/RMVL TUBE/OVARY N/A 6/20/2017    Procedure: ROBOTIC HYSTERECTOMY; BILATERAL SALPINO-OOPHERECTOMY; umbilical hernia repair.;  Surgeon: Tacho Arnold MD;  Location: [facility];  Service: Gynecology Oncology    ND REPAIR FIRST ABDOMINAL WALL HERNIA N/A 5/12/2022    Procedure: REPAIR HERNIA INCISIONAL;  Surgeon: Horacio Scherer DO;  Location: [facility];  Service: General    TONSILECTOMY AND ADNOIDECTOMY      TONSILLECTOMY      UMBILICAL HERNIA REPAIR        Family History   Problem Relation Age of Onset    Arthritis Mother     Leukemia Mother     Other Mother         Anxiety, major depressive disorder, recurrent episode with atypical features    Coronary artery disease Father          Heart problem    Diabetes Father     Other Father         Infectious disease    Alzheimer's disease Maternal Grandmother     Other Maternal Grandfather         Heart problem    Other Daughter         Anxiety, major depressive disorder, recurrent episode with atypical features    Alcohol abuse Other         Grandparent    Cancer Family     Diabetes Family     Hypertension Family     Other Family         Reported prior back trouble, thyroid disorder      Social History     Tobacco Use    Smoking status: Former     Current packs/day: 0.00     Average packs/day: 1 pack/day for 6.0 years (6.0 ttl pk-yrs)     Types: Cigarettes     Start date:      Quit date:      Years since quittin.0     Passive exposure: Never    Smokeless tobacco: Never    Tobacco comments:     Smoked about a half a pack for about 4 yrs.  Quite about 50 yrs ago.   Vaping Use    Vaping status: Never Used   Substance Use Topics    Alcohol use: Never    Drug use: No      E-Cigarette/Vaping    E-Cigarette Use Never User       E-Cigarette/Vaping Substances    Nicotine No     THC No     CBD No     Flavoring No     Other No     Unknown No       I have reviewed and agree with the history as documented.     79-year-old female presents after she fell last night, and landed on her left shoulder, now reports that she has pain in her right shoulder.  She reports she did not hit her head, did not lose consciousness, has had no headache, dizziness, nausea, vomiting, she reports she has some chronic neck pain and chronic pain over her sternotomy scar, but nothing that is worse or new, and she has had no other chest pain, cough, shortness of breath, abdominal pain, or other complaints.        Review of Systems   Constitutional:  Negative for fever.   Respiratory:  Negative for cough and shortness of breath.    Cardiovascular:  Negative for chest pain.   Gastrointestinal:  Negative for abdominal pain, constipation, diarrhea, nausea and vomiting.    Genitourinary:  Negative for dysuria and hematuria.   Musculoskeletal:  Positive for arthralgias.   Neurological:  Negative for light-headedness and headaches.           Objective       ED Triage Vitals [01/04/25 1159]   Temperature Pulse Blood Pressure Respirations SpO2 Patient Position - Orthostatic VS   98 °F (36.7 °C) 76 (!) 192/82 18 98 % Sitting      Temp Source Heart Rate Source BP Location FiO2 (%) Pain Score    Oral Monitor Left arm -- 9      Vitals      Date and Time Temp Pulse SpO2 Resp BP Pain Score FACES Pain Rating User   01/04/25 1159 98 °F (36.7 °C) 76 98 % 18 192/82 9 -- DO            Physical Exam  Vitals and nursing note reviewed.   Constitutional:       General: She is not in acute distress.     Appearance: Normal appearance. She is well-developed.   HENT:      Head: Normocephalic and atraumatic.   Eyes:      Extraocular Movements: Extraocular movements intact.      Conjunctiva/sclera: Conjunctivae normal.   Neck:      Comments: Cervical spine cleared clinically using Nexus criteria  Cardiovascular:      Rate and Rhythm: Normal rate and regular rhythm.   Pulmonary:      Effort: Pulmonary effort is normal. No respiratory distress.      Breath sounds: Normal breath sounds.   Abdominal:      General: There is no distension.      Palpations: Abdomen is soft.      Tenderness: There is no abdominal tenderness.   Musculoskeletal:         General: No swelling.      Cervical back: Normal range of motion.      Comments: Tenderness to palpation of the right glenohumeral joint, the left arm and remainder of the musculoskeletal exam was unremarkable, the patient has sensation, circulation, motor function intact distal to the affected area, and pain is elicited with passive and active movement of the right shoulder.   Skin:     General: Skin is warm and dry.      Capillary Refill: Capillary refill takes less than 2 seconds.   Neurological:      General: No focal deficit present.      Mental Status: She is  "alert and oriented to person, place, and time.   Psychiatric:         Mood and Affect: Mood normal.         Results Reviewed       None            XR shoulder 2+ views RIGHT   Final Interpretation by Sukumar Messina MD ([id number])      No acute osseous abnormality.         Computerized Assisted Algorithm (CAA) may have been used to analyze all applicable images.         Workstation performed: [id number]             Procedures    ED Medication and Procedure Management   Prior to Admission Medications   Prescriptions Last Dose Informant Patient Reported? Taking?   ALPRAZolam (XANAX) 0.5 mg tablet   No No   Sig: Take 1 tablet (0.5 mg total) by mouth 2 (two) times a day as needed for anxiety   B-D UF III MINI PEN NEEDLES 31G X 5 MM MISC  Self Yes No   BD Insulin Syringe U/F 31G X 5/16\" 0.5 ML MISC  Self Yes No   Si (four) times a day As directed   Coenzyme Q10 (Co Q-10) 200 MG CAPS  Self Yes No   Sig: Take by mouth in the morning   Continuous Blood Gluc Sensor (Dexcom G6 Sensor) MISC  Self No No   Sig: Use 1 each daily at bedtime as needed (scalp itching)   Continuous Blood Gluc Sensor (Dexcom G7 Sensor)  Self Yes No   Sig: Use 1 Device   Toujeo SoloStar 300 units/mL CONCENTRATED U-300 injection pen (1-unit dial)  Self No No   Sig: Inject 62 Units under the skin in the morning   aspirin 81 MG tablet  Self Yes No   Sig: Take 81 mg by mouth daily   bisacodyl (DULCOLAX) 5 mg EC tablet   No No   Sig: Take 2 tablets (10 mg total) by mouth once for 1 dose   cholecalciferol (VITAMIN D3) 1,000 units tablet  Self Yes No   Sig: Take 2,000 Units by mouth daily   diltiazem (TIAZAC) 300 MG 24 hr capsule  Self No No   Sig: Take 1 capsule (300 mg total) by mouth daily Tiddylt   erythromycin (ILOTYCIN) ophthalmic ointment   Yes No   Sig: ADMINISTER 1/2 INCH TO BOTH EYES DAILY AT BEDTIME   Patient not taking: Reported on 2024   insulin aspart (NovoLOG FlexPen) 100 UNIT/ML injection pen  Self Yes No   Sig: Inject 18 " "Units under the skin 2 (two) times a day with meals   irbesartan (AVAPRO) 300 mg tablet   Yes No   Sig: take 1 tablet by mouth every day at night   isosorbide mononitrate (IMDUR) 60 mg 24 hr tablet  Self Yes No   levothyroxine 75 mcg tablet  Self Yes No   Sig: Take 75 mcg by mouth daily   polyethylene glycol (GOLYTELY) 4000 mL solution   No No   Sig: Take 4,000 mL by mouth once for 1 dose   rosuvastatin (CRESTOR) 20 MG tablet  Self No No   Sig: Take 1 tablet (20 mg total) by mouth every evening   tobramycin-dexamethasone (TOBRADEX) ophthalmic suspension   Yes No   Sig: ADMINISTER 1 DROP TO THE RIGHT EYE EVERY 4 (FOUR) HOURS WHILE AWAKE   Patient not taking: Reported on 12/11/2024   torsemide (DEMADEX) 20 mg tablet  Self No No   Sig: TAKE 0.5 TABLETS (10 MG TOTAL) BY MOUTH AS NEEDED (TAKE UP TO 2 TIMES PER WEEK FOR LEG SWELLING AS NEEDED)   venlafaxine (EFFEXOR) 75 mg tablet   No No   Sig: Take 1 tablet (75 mg total) by mouth in the morning      Facility-Administered Medications: None     Discharge Medication List as of 1/4/2025  3:39 PM        CONTINUE these medications which have NOT CHANGED    Details   ALPRAZolam (XANAX) 0.5 mg tablet Take 1 tablet (0.5 mg total) by mouth 2 (two) times a day as needed for anxiety, Starting Thu 12/12/2024, Normal      aspirin 81 MG tablet Take 81 mg by mouth daily, Historical Med      B-D UF III MINI PEN NEEDLES 31G X 5 MM MISC Starting Fri 6/5/2020, Historical Med      BD Insulin Syringe U/F 31G X 5/16\" 0.5 ML MISC 4 (four) times a day As directed, Starting Mon 8/22/2022, Historical Med      bisacodyl (DULCOLAX) 5 mg EC tablet Take 2 tablets (10 mg total) by mouth once for 1 dose, Starting Wed 12/11/2024, Normal      cholecalciferol (VITAMIN D3) 1,000 units tablet Take 2,000 Units by mouth daily, Historical Med      Coenzyme Q10 (Co Q-10) 200 MG CAPS Take by mouth in the morning, Historical Med      !! Continuous Blood Gluc Sensor (Dexcom G6 Sensor) MISC Use 1 each daily at " bedtime as needed (scalp itching), Starting Tue 4/23/2024, Fill Later      !! Continuous Blood Gluc Sensor (Dexcom G7 Sensor) Use 1 Device, Starting Tue 12/5/2023, Historical Med      diltiazem (TIAZAC) 300 MG 24 hr capsule Take 1 capsule (300 mg total) by mouth daily Tiddylt, Starting Mon 8/19/2024, Normal      erythromycin (ILOTYCIN) ophthalmic ointment ADMINISTER 1/2 INCH TO BOTH EYES DAILY AT BEDTIME, Historical Med      insulin aspart (NovoLOG FlexPen) 100 UNIT/ML injection pen Inject 18 Units under the skin 2 (two) times a day with meals, Historical Med      irbesartan (AVAPRO) 300 mg tablet take 1 tablet by mouth every day at night, Historical Med      isosorbide mononitrate (IMDUR) 60 mg 24 hr tablet Historical Med      levothyroxine 75 mcg tablet Take 75 mcg by mouth daily, Starting Tue 11/7/2023, Historical Med      polyethylene glycol (GOLYTELY) 4000 mL solution Take 4,000 mL by mouth once for 1 dose, Starting Wed 12/11/2024, Normal      rosuvastatin (CRESTOR) 20 MG tablet Take 1 tablet (20 mg total) by mouth every evening, Starting Mon 8/19/2024, Normal      tobramycin-dexamethasone (TOBRADEX) ophthalmic suspension ADMINISTER 1 DROP TO THE RIGHT EYE EVERY 4 (FOUR) HOURS WHILE AWAKE, Historical Med      torsemide (DEMADEX) 20 mg tablet TAKE 0.5 TABLETS (10 MG TOTAL) BY MOUTH AS NEEDED (TAKE UP TO 2 TIMES PER WEEK FOR LEG SWELLING AS NEEDED), Starting Fri 5/31/2024, Normal      Toujeo SoloStar 300 units/mL CONCENTRATED U-300 injection pen (1-unit dial) Inject 62 Units under the skin in the morning, Starting Tue 4/23/2024, No Print      venlafaxine (EFFEXOR) 75 mg tablet Take 1 tablet (75 mg total) by mouth in the morning, Starting Tue 11/5/2024, Until Wed 12/11/2024, Normal       !! - Potential duplicate medications found. Please discuss with provider.          ED SEPSIS DOCUMENTATION   Time reflects when diagnosis was documented in both MDM as applicable and the Disposition within this note       Time  User Action Codes Description Comment    1/4/2025  3:38 PM Sukumar Odonnell Add [S46.001A] Injury of right rotator cuff, initial encounter                  Sukumar Odonnell MD  01/04/25 7455

## 2025-01-15 DIAGNOSIS — F41.1 GAD (GENERALIZED ANXIETY DISORDER): ICD-10-CM

## 2025-01-15 DIAGNOSIS — F41.0 PANIC ATTACKS: ICD-10-CM

## 2025-01-15 DIAGNOSIS — F41.9 ANXIETY: ICD-10-CM

## 2025-01-15 RX ORDER — ALPRAZOLAM 0.5 MG
0.5 TABLET ORAL 2 TIMES DAILY PRN
Qty: 60 TABLET | Refills: 0 | Status: SHIPPED | OUTPATIENT
Start: 2025-01-15

## 2025-01-15 NOTE — TELEPHONE ENCOUNTER
Reason for call:   [x] Refill   [] Prior Auth  [] Other:     Office:   [x] PCP/Provider -   [] Specialty/Provider -     Medication: ALPRAZolam (XANAX) 0.5 mg       Dose/Frequency: Take 1 tablet (0.5 mg total) by mouth 2 (two) times a day as needed for anxiety     Quantity: 60    Pharmacy: [facility]/pharmacy #[id number] [facility], [place] - [address]        Does the patient have enough for 3 days?   [x] Yes   [] No - Send as HP to POD

## 2025-01-31 DIAGNOSIS — Z79.4 TYPE 2 DIABETES MELLITUS WITH COMPLICATION, WITH LONG-TERM CURRENT USE OF INSULIN (HCC): Primary | ICD-10-CM

## 2025-01-31 DIAGNOSIS — E11.8 TYPE 2 DIABETES MELLITUS WITH COMPLICATION, WITH LONG-TERM CURRENT USE OF INSULIN (HCC): Primary | ICD-10-CM

## 2025-01-31 RX ORDER — ACYCLOVIR 400 MG/1
1 TABLET ORAL
Qty: 10 EACH | Refills: 0 | Status: SHIPPED | OUTPATIENT
Start: 2025-01-31

## 2025-01-31 NOTE — TELEPHONE ENCOUNTER
Reason for call:   [x] Refill   [] Prior Auth  [] Other:     Office:   [x] [facility]   [] Specialty/Provider -     Medication: Continuous Blood Gluc Sensor (Dexcom G7 Sensor     Dose/Frequency: 1 Device     Quantity: 10    Pharmacy: [id number]    Does the patient have enough for 3 days?   [] Yes   [x] No - Send as HP to POD

## 2025-02-03 NOTE — TELEPHONE ENCOUNTER
Patient called the RX Refill Line. Message is being forwarded to the office.     Patient called, she was given a dexcom G6 sensor instead of the G7.I told her that the script was sent for the G7 so someone at the pharmacy made a mistake and they should be able to give her the correct one but told her to call back if she runs into any issues     Please contact patient at

## 2025-02-05 ENCOUNTER — TELEPHONE (OUTPATIENT)
Age: 80
End: 2025-02-05

## 2025-02-05 NOTE — TELEPHONE ENCOUNTER
PA Dexcom G7 Sensor SUBMITTED     to [employer]     via    []CMM-KEY:    [x]Surescripts   []Availity-Auth ID #  NDC #    []Faxed to plan   []Other website    []Phone call Case ID #      []PA sent as URGENT    All office notes, labs and other pertaining documents and studies sent. Clinical questions answered. Awaiting determination from insurance company.     Turnaround time for your insurance to make a decision on your Prior Authorization can take 7-21 business days.

## 2025-02-07 ENCOUNTER — RA CDI HCC (OUTPATIENT)
Dept: OTHER | Facility: HOSPITAL | Age: 80
End: 2025-02-07

## 2025-02-10 ENCOUNTER — OFFICE VISIT (OUTPATIENT)
Dept: INTERNAL MEDICINE CLINIC | Facility: CLINIC | Age: 80
End: 2025-02-10
Payer: COMMERCIAL

## 2025-02-10 VITALS
OXYGEN SATURATION: 97 % | DIASTOLIC BLOOD PRESSURE: 62 MMHG | HEIGHT: 61 IN | RESPIRATION RATE: 20 BRPM | TEMPERATURE: 97.2 F | BODY MASS INDEX: 35.45 KG/M2 | WEIGHT: 187.8 LBS | HEART RATE: 66 BPM | SYSTOLIC BLOOD PRESSURE: 130 MMHG

## 2025-02-10 DIAGNOSIS — I10 PRIMARY HYPERTENSION: ICD-10-CM

## 2025-02-10 DIAGNOSIS — L40.50 PSORIASIS WITH ARTHROPATHY (HCC): ICD-10-CM

## 2025-02-10 DIAGNOSIS — E66.9 OBESITY (BMI 30-39.9): ICD-10-CM

## 2025-02-10 DIAGNOSIS — R29.898 MUSCULAR DECONDITIONING: ICD-10-CM

## 2025-02-10 DIAGNOSIS — R09.82 POST-NASAL DRIP: ICD-10-CM

## 2025-02-10 DIAGNOSIS — R93.0 ABNORMAL CT OF THE HEAD: ICD-10-CM

## 2025-02-10 DIAGNOSIS — E78.2 MIXED HYPERLIPIDEMIA: ICD-10-CM

## 2025-02-10 DIAGNOSIS — M75.01 ADHESIVE CAPSULITIS OF RIGHT SHOULDER: ICD-10-CM

## 2025-02-10 DIAGNOSIS — Z79.4 TYPE 2 DIABETES MELLITUS WITH COMPLICATION, WITH LONG-TERM CURRENT USE OF INSULIN (HCC): Primary | ICD-10-CM

## 2025-02-10 DIAGNOSIS — E66.01 CLASS 3 SEVERE OBESITY DUE TO EXCESS CALORIES WITHOUT SERIOUS COMORBIDITY IN ADULT, UNSPECIFIED BMI (HCC): ICD-10-CM

## 2025-02-10 DIAGNOSIS — E11.42 TYPE 2 DIABETES MELLITUS WITH DIABETIC POLYNEUROPATHY, UNSPECIFIED WHETHER LONG TERM INSULIN USE (HCC): ICD-10-CM

## 2025-02-10 DIAGNOSIS — N18.32 STAGE 3B CHRONIC KIDNEY DISEASE (HCC): ICD-10-CM

## 2025-02-10 DIAGNOSIS — F33.2 MDD (MAJOR DEPRESSIVE DISORDER), RECURRENT SEVERE, WITHOUT PSYCHOSIS (HCC): ICD-10-CM

## 2025-02-10 DIAGNOSIS — K11.8 PAROTID NODULE: ICD-10-CM

## 2025-02-10 DIAGNOSIS — E66.813 CLASS 3 SEVERE OBESITY DUE TO EXCESS CALORIES WITHOUT SERIOUS COMORBIDITY IN ADULT, UNSPECIFIED BMI (HCC): ICD-10-CM

## 2025-02-10 DIAGNOSIS — E11.8 TYPE 2 DIABETES MELLITUS WITH COMPLICATION, WITH LONG-TERM CURRENT USE OF INSULIN (HCC): Primary | ICD-10-CM

## 2025-02-10 LAB — SL AMB POCT HEMOGLOBIN AIC: 12.4 (ref ?–6.5)

## 2025-02-10 PROCEDURE — G2211 COMPLEX E/M VISIT ADD ON: HCPCS | Performed by: INTERNAL MEDICINE

## 2025-02-10 PROCEDURE — 99214 OFFICE O/P EST MOD 30 MIN: CPT | Performed by: INTERNAL MEDICINE

## 2025-02-10 PROCEDURE — 83036 HEMOGLOBIN GLYCOSYLATED A1C: CPT | Performed by: INTERNAL MEDICINE

## 2025-02-10 RX ORDER — FLUTICASONE PROPIONATE 50 MCG
2 SPRAY, SUSPENSION (ML) NASAL DAILY
Qty: 1 G | Refills: 0 | Status: SHIPPED | OUTPATIENT
Start: 2025-02-10

## 2025-02-10 NOTE — ASSESSMENT & PLAN NOTE
Hypertension - controlled, I have counseled patient following healthy balance diet, I would like the patient reduce sodium, exercise routinely, I would like the patient continued the med current medical regiment and we will continue to monitor.     Moderate to severe chronic microvascular disease.     1 cm right parotid lesion, nonspecific possibly reflecting an enlarged   intraparotid node. Nonemergent parotid ultrasound recommended to further   evaluate this finding.

## 2025-02-10 NOTE — ASSESSMENT & PLAN NOTE
Depression Screening Follow-up Plan: Patient's depression screening was positive with a PHQ-9 score of 8.      Clinically stable and doing well continue the current medical regiment will continue monitor.

## 2025-02-10 NOTE — ASSESSMENT & PLAN NOTE
I have counselled the pt to follow a healthy and balanced diet ,and recommend routine exercise.  Patient reports me she is in transition to see a new endocrinologist she reports me she has not been able to get her sensor for monitoring of her blood sugar she has tried to use her glucometer I will have her meet with pharmacist Johana I did explain to the patient her diabetes is not well-controlled I would like her to follow a healthy and balanced diet  Lab Results   Component Value Date    HGBA1C 12.4 (A) 02/10/2025     Orders:  •  POCT hemoglobin A1c  •  Comprehensive metabolic panel; Future  •  Albumin / creatinine urine ratio; Future  •  Lipid Panel with Direct LDL reflex; Future  •  TSH, 3rd generation with Free T4 reflex; Future  •  Hemoglobin A1C; Future  •  Ambulatory referral to clinical pharmacy; Future

## 2025-02-10 NOTE — PROGRESS NOTES
Name: Yajaira Marsh      : 1945      MRN: [id number]  Encounter Provider: Sukumar Clemens DO  Encounter Date: 2/10/2025   Encounter department: [facility]  :  Assessment & Plan  Type 2 diabetes mellitus with complication, with long-term current use of insulin (HCC)  I have counselled the pt to follow a healthy and balanced diet ,and recommend routine exercise.  Patient reports me she is in transition to see a new endocrinologist she reports me she has not been able to get her sensor for monitoring of her blood sugar she has tried to use her glucometer I will have her meet with pharmacist Johana I did explain to the patient her diabetes is not well-controlled I would like her to follow a healthy and balanced diet  Lab Results   Component Value Date    HGBA1C 12.4 (A) 02/10/2025     Orders:  •  POCT hemoglobin A1c  •  Comprehensive metabolic panel; Future  •  Albumin / creatinine urine ratio; Future  •  Lipid Panel with Direct LDL reflex; Future  •  TSH, 3rd generation with Free T4 reflex; Future  •  Hemoglobin A1C; Future  •  Ambulatory referral to clinical pharmacy; Future    Stage 3b chronic kidney disease (HCC)  Lab Results   Component Value Date    EGFR 33 (L) 2025    EGFR 33 2024    EGFR 30 2024    CREATININE 1.6 (H) 2025    CREATININE 1.50 (H) 2024    CREATININE 1.60 (H) 2024   Drink adequate amount of water, avoid anti-inflammatories, reduce sodium in the diet and will continue to monitor the kidney function       Obesity (BMI 30-39.9)  Obesity -I have counseled patient following healthy and balanced diet, I would like the patient to lose weight, I would like the patient exercise routinely; we will continue monitor the patient's progress.       Mixed hyperlipidemia  Hyperlipidemia controlled continue with current medical regiment recommend a low-cholesterol diet and recommend routine exercise we will continue to monitor the progress.   Continue Crestor 20 mg once daily       MDD (major depressive disorder), recurrent severe, without psychosis ([facility])  Depression Screening Follow-up Plan: Patient's depression screening was positive with a PHQ-9 score of 8.      Clinically stable and doing well continue the current medical regiment will continue monitor.    Primary hypertension  Hypertension - controlled, I have counseled patient following healthy balance diet, I would like the patient reduce sodium, exercise routinely, I would like the patient continued the med current medical regiment and we will continue to monitor.     Moderate to severe chronic microvascular disease.     1 cm right parotid lesion, nonspecific possibly reflecting an enlarged   intraparotid node. Nonemergent parotid ultrasound recommended to further   evaluate this finding.     Abnormal CT of the head  Reviewed recent hospitalization ER reports regarding confusion abnormal CAT scan rule out early dementia will have patient see neurology  Orders:  •  Ambulatory Referral to Neurology; Future    Parotid nodule  CAT scan did show a parotid nodule requiring further evaluation check ultrasound parotid  Orders:  •  US head neck soft tissue; Future    Psoriasis with arthropathy (HCC)         Class 3 severe obesity due to excess calories without serious comorbidity in adult, unspecified BMI ([facility])  Obesity -I have counseled patient following healthy and balanced diet, I would like the patient to lose weight, I would like the patient exercise routinely; we will continue monitor the patient's progress.       Type 2 diabetes mellitus with diabetic polyneuropathy, unspecified whether long term insulin use (HCC)  I have counselled the pt to follow a healthy and balanced diet ,and recommend routine exercise.  See pharmacist  Lab Results   Component Value Date    HGBA1C 12.4 (A) 02/10/2025            Adhesive capsulitis of right shoulder  Will have patient see orthopedics Dr. Ceja  Orders:  •   Ambulatory Referral to Orthopedic Surgery; Future    Post-nasal drip  Recommend Flonase 2 sprays each nostril once daily for 10 days and then as needed  Orders:  •  fluticasone (FLONASE) 50 mcg/act nasal spray; 2 sprays into each nostril daily    Muscular deconditioning  Multifactorial she does have difficulty getting off the chair positive get up and go test she has had falls in the past recommend using a walker at all times I did recommend physical therapy but she reports me she cannot do this because of the cost of the co-pay I have recommended to patient to do her own home exercises and use a walker at all times to help protect and prevent a fall     RTO 3 months call if any problems        Depression Screening and Follow-up Plan: Patient's depression screening was positive with a PHQ-9 score of 8.   Patient assessed for underlying major depression. Brief counseling provided and recommend additional follow-up/re-evaluation next office visit.   Falls Plan of Care: balance, strength, and gait training instructions were provided. Recommended assistive device to help with gait and balance.     Urinary Incontinence Plan of Care: counseling topics discussed: practice Kegel (pelvic floor strengthening) exercises.       History of Present Illness   HPI 79-year old female coming in for a follow up office visit regarding type 2 diabetes, chronic kidney disease, hyperlipidemia, obesity, hypertension, parotid nodule, psoriasis, adhesive capsulitis right shoulder postnasal drip and muscular deconditioning; patient has had 2 recent ER visits for a fall and confusion; patient does report to me falling on a table at home on the left side of her body interestingly her primary pain site is the right shoulder she presented to the emergency room at [facility] on 1 4 initially found to have a rotator cuff tendinitis and subsequently on 1/5/2025 to [facility] after developing a acute mental status change; pt is in  "process of seeing a new endocrinologist Dr. Pope appointment date 3/18/2025  Cough  worse with laying in throat  3 weeks dry , no f/c   has moms walker and will us , losing   Patient also reports to me unsteady with cane becoming more deconditioned.  She is transitioning to a different endocrinologist.  She has not had her sensor for glucose monitor she would like to meet with the pharmacist she is not able to adjust her insulin dose because she is not able to see her blood sugar  Review of Systems   Constitutional:  Negative for activity change, appetite change and unexpected weight change.   HENT:  Negative for congestion and postnasal drip.    Eyes:  Negative for visual disturbance.   Respiratory:  Positive for cough. Negative for shortness of breath.    Cardiovascular:  Negative for chest pain.   Gastrointestinal:  Negative for abdominal pain, diarrhea, nausea and vomiting.   Neurological:  Negative for dizziness, light-headedness and headaches.   Hematological:  Negative for adenopathy.       Objective   /62 (BP Location: Left arm, Patient Position: Sitting, Cuff Size: Standard)   Pulse 66   Temp (!) 97.2 °F (36.2 °C) (Temporal)   Resp 20   Ht 5' 1\" (1.549 m)   Wt 85.2 kg (187 lb 12.8 oz)   SpO2 97%   BMI 35.48 kg/m²      Physical Exam  Vitals and nursing note reviewed.   Constitutional:       General: She is not in acute distress.     Appearance: Normal appearance. She is well-developed. She is obese. She is not ill-appearing, toxic-appearing or diaphoretic.   HENT:      Head: Normocephalic and atraumatic.      Right Ear: External ear normal.      Left Ear: External ear normal.      Nose: Nose normal.   Eyes:      Pupils: Pupils are equal, round, and reactive to light.   Cardiovascular:      Rate and Rhythm: Normal rate and regular rhythm.      Heart sounds: Normal heart sounds. No murmur heard.  Pulmonary:      Effort: Pulmonary effort is normal.      Breath sounds: Normal breath sounds. "   Abdominal:      General: There is no distension.      Palpations: Abdomen is soft.      Tenderness: There is no abdominal tenderness. There is no guarding.   Psychiatric:         Mood and Affect: Mood is not anxious or depressed.         Thought Content: Thought content does not include suicidal ideation.     Frozen shoulder right shoulder chronic 1+ edema bilaterally  Administrative Statements   I have spent a total time of 30 minutes in caring for this patient on the day of the visit/encounter including Diagnostic results, Risks and benefits of tx options, Instructions for management, Impressions, Counseling / Coordination of care, Documenting in the medical record, Reviewing / ordering tests, medicine, procedures  , and Obtaining or reviewing history  .

## 2025-02-10 NOTE — PROGRESS NOTES
Diabetic Foot Exam    Patient's shoes and socks removed.    Right Foot/Ankle   Right Foot Inspection  Skin Exam: skin normal. Skin not intact, no warmth, no callus, no erythema, no maceration, no abnormal color, no pre-ulcer, no ulcer and no callus.     Toe Exam: ROM and strength within normal limits and swelling.     Sensory   Monofilament testing: intact    Vascular  Capillary refills: < 3 seconds  The right DP pulse is 1+. The right PT pulse is 1+.     Left Foot/Ankle  Left Foot Inspection  Skin Exam: skin normal. Skin not intact, no warmth, no erythema, no maceration, normal color, no pre-ulcer, no ulcer and no callus.     Toe Exam: ROM and strength within normal limits and swelling.     Sensory   Monofilament testing: intact    Vascular  Capillary refills: < 3 seconds  The left DP pulse is 1+. The left PT pulse is 1+.     Assign Risk Category  No deformity present  No loss of protective sensation  No weak pulses  Risk: 0

## 2025-02-10 NOTE — ASSESSMENT & PLAN NOTE
Lab Results   Component Value Date    EGFR 33 (L) 01/05/2025    EGFR 33 11/04/2024    EGFR 30 11/03/2024    CREATININE 1.6 (H) 01/05/2025    CREATININE 1.50 (H) 11/04/2024    CREATININE 1.60 (H) 11/03/2024

## 2025-02-10 NOTE — ASSESSMENT & PLAN NOTE
Multifactorial she does have difficulty getting off the chair positive get up and go test she has had falls in the past recommend using a walker at all times I did recommend physical therapy but she reports me she cannot do this because of the cost of the co-pay I have recommended to patient to do her own home exercises and use a walker at all times to help protect and prevent a fall     RTO 3 months call if any problems

## 2025-02-10 NOTE — ASSESSMENT & PLAN NOTE
Lab Results   Component Value Date    EGFR 33 (L) 01/05/2025    EGFR 33 11/04/2024    EGFR 30 11/03/2024    CREATININE 1.6 (H) 01/05/2025    CREATININE 1.50 (H) 11/04/2024    CREATININE 1.60 (H) 11/03/2024   Drink adequate amount of water, avoid anti-inflammatories, reduce sodium in the diet and will continue to monitor the kidney function

## 2025-02-10 NOTE — ASSESSMENT & PLAN NOTE
Recommend Flonase 2 sprays each nostril once daily for 10 days and then as needed  Orders:  •  fluticasone (FLONASE) 50 mcg/act nasal spray; 2 sprays into each nostril daily

## 2025-02-11 ENCOUNTER — HOSPITAL ENCOUNTER (INPATIENT)
Facility: HOSPITAL | Age: 80
LOS: 4 days | Discharge: NURSING, REHAB OR LONG-TERM CARE | DRG: 871 | End: 2025-02-15
Attending: EMERGENCY MEDICINE | Admitting: INTERNAL MEDICINE
Payer: COMMERCIAL

## 2025-02-11 ENCOUNTER — APPOINTMENT (EMERGENCY)
Dept: CT IMAGING | Facility: HOSPITAL | Age: 80
DRG: 871 | End: 2025-02-11
Payer: COMMERCIAL

## 2025-02-11 ENCOUNTER — APPOINTMENT (EMERGENCY)
Dept: RADIOLOGY | Facility: HOSPITAL | Age: 80
DRG: 871 | End: 2025-02-11
Payer: COMMERCIAL

## 2025-02-11 DIAGNOSIS — J18.9 PNEUMONIA: Primary | ICD-10-CM

## 2025-02-11 DIAGNOSIS — R41.82 ALTERED MENTAL STATUS: ICD-10-CM

## 2025-02-11 DIAGNOSIS — R53.1 WEAKNESS: ICD-10-CM

## 2025-02-11 DIAGNOSIS — R73.9 HYPERGLYCEMIA: ICD-10-CM

## 2025-02-11 DIAGNOSIS — A41.9 SEPSIS (HCC): ICD-10-CM

## 2025-02-11 DIAGNOSIS — E83.42 HYPOMAGNESEMIA: ICD-10-CM

## 2025-02-11 DIAGNOSIS — E87.5 HYPERKALEMIA: ICD-10-CM
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
## 2025-02-11 PROBLEM — G92.8 TOXIC METABOLIC ENCEPHALOPATHY: Status: ACTIVE | Noted: 2025-02-11

## 2025-02-11 LAB
2HR DELTA HS TROPONIN: 1 NG/L
ALBUMIN SERPL BCG-MCNC: 3.8 G/DL (ref 3.5–5)
ALP SERPL-CCNC: 102 U/L (ref 34–104)
ALT SERPL W P-5'-P-CCNC: 18 U/L (ref 7–52)
ANION GAP SERPL CALCULATED.3IONS-SCNC: 10 MMOL/L (ref 4–13)
ANION GAP SERPL CALCULATED.3IONS-SCNC: 7 MMOL/L (ref 4–13)
APTT PPP: 27 SECONDS (ref 23–34)
AST SERPL W P-5'-P-CCNC: 18 U/L (ref 13–39)
ATRIAL RATE: 97 BPM
B-OH-BUTYR SERPL-MCNC: 0.19 MMOL/L (ref 0.02–0.27)
BACTERIA UR QL AUTO: NORMAL /HPF
BASE EX.OXY STD BLDV CALC-SCNC: 88.8 % (ref 60–80)
BASE EXCESS BLDV CALC-SCNC: -6 MMOL/L
BASOPHILS # BLD MANUAL: 0 THOUSAND/UL (ref 0–0.1)
BASOPHILS NFR MAR MANUAL: 0 % (ref 0–1)
BILIRUB SERPL-MCNC: 0.63 MG/DL (ref 0.2–1)
BILIRUB UR QL STRIP: NEGATIVE
BUN SERPL-MCNC: 28 MG/DL (ref 5–25)
BUN SERPL-MCNC: 31 MG/DL (ref 5–25)
CALCIUM SERPL-MCNC: 8.8 MG/DL (ref 8.4–10.2)
CALCIUM SERPL-MCNC: 9.2 MG/DL (ref 8.4–10.2)
CARDIAC TROPONIN I PNL SERPL HS: 8 NG/L (ref ?–50)
CARDIAC TROPONIN I PNL SERPL HS: 9 NG/L (ref ?–50)
CHLORIDE SERPL-SCNC: 103 MMOL/L (ref 96–108)
CHLORIDE SERPL-SCNC: 107 MMOL/L (ref 96–108)
CK SERPL-CCNC: 145 U/L (ref 26–192)
CLARITY UR: CLEAR
CO2 SERPL-SCNC: 21 MMOL/L (ref 21–32)
CO2 SERPL-SCNC: 22 MMOL/L (ref 21–32)
COLOR UR: ABNORMAL
CREAT SERPL-MCNC: 1.58 MG/DL (ref 0.6–1.3)
CREAT SERPL-MCNC: 1.65 MG/DL (ref 0.6–1.3)
CRP SERPL QL: 112.6 MG/L
EOSINOPHIL # BLD MANUAL: 0 THOUSAND/UL (ref 0–0.4)
EOSINOPHIL NFR BLD MANUAL: 0 % (ref 0–6)
ERYTHROCYTE [DISTWIDTH] IN BLOOD BY AUTOMATED COUNT: 14.4 % (ref 11.6–15.1)
FLUAV AG UPPER RESP QL IA.RAPID: NEGATIVE
FLUBV AG UPPER RESP QL IA.RAPID: NEGATIVE
GFR SERPL CREATININE-BSD FRML MDRD: 29 ML/MIN/1.73SQ M
GFR SERPL CREATININE-BSD FRML MDRD: 31 ML/MIN/1.73SQ M
GLUCOSE SERPL-MCNC: 157 MG/DL (ref 65–140)
GLUCOSE SERPL-MCNC: 246 MG/DL (ref 65–140)
GLUCOSE SERPL-MCNC: 277 MG/DL (ref 65–140)
GLUCOSE SERPL-MCNC: 327 MG/DL (ref 65–140)
GLUCOSE SERPL-MCNC: 371 MG/DL (ref 65–140)
GLUCOSE SERPL-MCNC: 406 MG/DL (ref 65–140)
GLUCOSE SERPL-MCNC: 429 MG/DL (ref 65–140)
GLUCOSE SERPL-MCNC: 469 MG/DL (ref 65–140)
GLUCOSE UR STRIP-MCNC: ABNORMAL MG/DL
HCO3 BLDV-SCNC: 18.8 MMOL/L (ref 24–30)
HCT VFR BLD AUTO: 32.2 % (ref 34.8–46.1)
HGB BLD-MCNC: 10.5 G/DL (ref 11.5–15.4)
HGB UR QL STRIP.AUTO: NEGATIVE
HYPERCHROMIA BLD QL SMEAR: PRESENT
INR PPP: 1.12 (ref 0.85–1.19)
KETONES UR STRIP-MCNC: NEGATIVE MG/DL
LACTATE SERPL-SCNC: 2 MMOL/L (ref 0.5–2)
LEUKOCYTE ESTERASE UR QL STRIP: ABNORMAL
LG PLATELETS BLD QL SMEAR: PRESENT
LYMPHOCYTES # BLD AUTO: 1.6 THOUSAND/UL (ref 0.6–4.47)
LYMPHOCYTES # BLD AUTO: 9 % (ref 14–44)
MAGNESIUM SERPL-MCNC: 1.3 MG/DL (ref 1.9–2.7)
MCH RBC QN AUTO: 28 PG (ref 26.8–34.3)
MCHC RBC AUTO-ENTMCNC: 32.6 G/DL (ref 31.4–37.4)
MCV RBC AUTO: 86 FL (ref 82–98)
MICROCYTES BLD QL AUTO: PRESENT
MONOCYTES # BLD AUTO: 0.36 THOUSAND/UL (ref 0–1.22)
MONOCYTES NFR BLD: 2 % (ref 4–12)
NEUTROPHILS # BLD MANUAL: 15.86 THOUSAND/UL (ref 1.85–7.62)
NEUTS BAND NFR BLD MANUAL: 7 % (ref 0–8)
NEUTS SEG NFR BLD AUTO: 82 % (ref 43–75)
NITRITE UR QL STRIP: NEGATIVE
NON-SQ EPI CELLS URNS QL MICRO: NORMAL /HPF
O2 CT BLDV-SCNC: 14.4 ML/DL
P AXIS: 61 DEGREES
PCO2 BLDV: 35 MM HG (ref 42–50)
PH BLDV: 7.35 [PH] (ref 7.3–7.4)
PH UR STRIP.AUTO: 5 [PH]
PLATELET # BLD AUTO: 197 THOUSANDS/UL (ref 149–390)
PLATELET BLD QL SMEAR: ADEQUATE
PMV BLD AUTO: 12.1 FL (ref 8.9–12.7)
PO2 BLDV: 64.8 MM HG (ref 35–45)
POIKILOCYTOSIS BLD QL SMEAR: PRESENT
POTASSIUM SERPL-SCNC: 4.7 MMOL/L (ref 3.5–5.3)
POTASSIUM SERPL-SCNC: 5.5 MMOL/L (ref 3.5–5.3)
PR INTERVAL: 184 MS
PROCALCITONIN SERPL-MCNC: 2.56 NG/ML
PROT SERPL-MCNC: 7.5 G/DL (ref 6.4–8.4)
PROT UR STRIP-MCNC: ABNORMAL MG/DL
PROTHROMBIN TIME: 15.2 SECONDS (ref 12.3–15)
QRS AXIS: -22 DEGREES
QRSD INTERVAL: 86 MS
QT INTERVAL: 358 MS
QTC INTERVAL: 454 MS
RBC # BLD AUTO: 3.75 MILLION/UL (ref 3.81–5.12)
RBC #/AREA URNS AUTO: NORMAL /HPF
RBC MORPH BLD: PRESENT
S PNEUM AG UR QL: NEGATIVE
SARS-COV+SARS-COV-2 AG RESP QL IA.RAPID: NEGATIVE
SODIUM SERPL-SCNC: 134 MMOL/L (ref 135–147)
SODIUM SERPL-SCNC: 136 MMOL/L (ref 135–147)
SP GR UR STRIP.AUTO: 1.02 (ref 1–1.03)
T WAVE AXIS: 1 DEGREES
T4 FREE SERPL-MCNC: 0.88 NG/DL (ref 0.61–1.12)
TSH SERPL DL<=0.05 MIU/L-ACNC: 2.03 UIU/ML (ref 0.45–4.5)
UROBILINOGEN UR STRIP-ACNC: <2 MG/DL
VENTRICULAR RATE: 97 BPM
WBC # BLD AUTO: 17.82 THOUSAND/UL (ref 4.31–10.16)
WBC #/AREA URNS AUTO: NORMAL /HPF

## 2025-02-11 PROCEDURE — 82948 REAGENT STRIP/BLOOD GLUCOSE: CPT

## 2025-02-11 PROCEDURE — 74176 CT ABD & PELVIS W/O CONTRAST: CPT

## 2025-02-11 PROCEDURE — 83735 ASSAY OF MAGNESIUM: CPT

## 2025-02-11 PROCEDURE — 86140 C-REACTIVE PROTEIN: CPT

## 2025-02-11 PROCEDURE — 87040 BLOOD CULTURE FOR BACTERIA: CPT

## 2025-02-11 PROCEDURE — 96375 TX/PRO/DX INJ NEW DRUG ADDON: CPT

## 2025-02-11 PROCEDURE — 81001 URINALYSIS AUTO W/SCOPE: CPT

## 2025-02-11 PROCEDURE — 85007 BL SMEAR W/DIFF WBC COUNT: CPT

## 2025-02-11 PROCEDURE — 87449 NOS EACH ORGANISM AG IA: CPT

## 2025-02-11 PROCEDURE — 84443 ASSAY THYROID STIM HORMONE: CPT

## 2025-02-11 PROCEDURE — 70450 CT HEAD/BRAIN W/O DYE: CPT

## 2025-02-11 PROCEDURE — 82010 KETONE BODYS QUAN: CPT

## 2025-02-11 PROCEDURE — 99223 1ST HOSP IP/OBS HIGH 75: CPT | Performed by: INTERNAL MEDICINE

## 2025-02-11 PROCEDURE — 85730 THROMBOPLASTIN TIME PARTIAL: CPT

## 2025-02-11 PROCEDURE — 85610 PROTHROMBIN TIME: CPT

## 2025-02-11 PROCEDURE — 80053 COMPREHEN METABOLIC PANEL: CPT

## 2025-02-11 PROCEDURE — 99285 EMERGENCY DEPT VISIT HI MDM: CPT

## 2025-02-11 PROCEDURE — 87811 SARS-COV-2 COVID19 W/OPTIC: CPT

## 2025-02-11 PROCEDURE — 87804 INFLUENZA ASSAY W/OPTIC: CPT

## 2025-02-11 PROCEDURE — 71045 X-RAY EXAM CHEST 1 VIEW: CPT

## 2025-02-11 PROCEDURE — 85027 COMPLETE CBC AUTOMATED: CPT

## 2025-02-11 PROCEDURE — 93005 ELECTROCARDIOGRAM TRACING: CPT

## 2025-02-11 PROCEDURE — 36415 COLL VENOUS BLD VENIPUNCTURE: CPT

## 2025-02-11 PROCEDURE — 83605 ASSAY OF LACTIC ACID: CPT

## 2025-02-11 PROCEDURE — 96372 THER/PROPH/DIAG INJ SC/IM: CPT

## 2025-02-11 PROCEDURE — 71250 CT THORAX DX C-: CPT

## 2025-02-11 PROCEDURE — 72125 CT NECK SPINE W/O DYE: CPT

## 2025-02-11 PROCEDURE — 94760 N-INVAS EAR/PLS OXIMETRY 1: CPT

## 2025-02-11 PROCEDURE — 82805 BLOOD GASES W/O2 SATURATION: CPT

## 2025-02-11 PROCEDURE — 82550 ASSAY OF CK (CPK): CPT

## 2025-02-11 PROCEDURE — 93010 ELECTROCARDIOGRAM REPORT: CPT | Performed by: INTERNAL MEDICINE

## 2025-02-11 PROCEDURE — 84484 ASSAY OF TROPONIN QUANT: CPT

## 2025-02-11 PROCEDURE — 94664 DEMO&/EVAL PT USE INHALER: CPT

## 2025-02-11 PROCEDURE — 84145 PROCALCITONIN (PCT): CPT

## 2025-02-11 PROCEDURE — 84439 ASSAY OF FREE THYROXINE: CPT

## 2025-02-11 PROCEDURE — 80048 BASIC METABOLIC PNL TOTAL CA: CPT

## 2025-02-11 PROCEDURE — 96365 THER/PROPH/DIAG IV INF INIT: CPT

## 2025-02-11 RX ORDER — ALPRAZOLAM 0.5 MG
0.5 TABLET ORAL 2 TIMES DAILY PRN
Status: DISCONTINUED | OUTPATIENT
Start: 2025-02-11 | End: 2025-02-15 | Stop reason: HOSPADM

## 2025-02-11 RX ORDER — BENZONATATE 100 MG/1
100 CAPSULE ORAL 3 TIMES DAILY PRN
Status: DISCONTINUED | OUTPATIENT
Start: 2025-02-11 | End: 2025-02-15 | Stop reason: HOSPADM

## 2025-02-11 RX ORDER — INSULIN LISPRO 100 [IU]/ML
1-6 INJECTION, SOLUTION INTRAVENOUS; SUBCUTANEOUS
Status: DISCONTINUED | OUTPATIENT
Start: 2025-02-11 | End: 2025-02-15 | Stop reason: HOSPADM

## 2025-02-11 RX ORDER — LOSARTAN POTASSIUM 50 MG/1
100 TABLET ORAL DAILY
Status: DISCONTINUED | OUTPATIENT
Start: 2025-02-11 | End: 2025-02-15 | Stop reason: HOSPADM

## 2025-02-11 RX ORDER — ISOSORBIDE MONONITRATE 60 MG/1
60 TABLET, EXTENDED RELEASE ORAL DAILY
Status: DISCONTINUED | OUTPATIENT
Start: 2025-02-11 | End: 2025-02-15 | Stop reason: HOSPADM

## 2025-02-11 RX ORDER — ONDANSETRON 2 MG/ML
4 INJECTION INTRAMUSCULAR; INTRAVENOUS EVERY 6 HOURS PRN
Status: DISCONTINUED | OUTPATIENT
Start: 2025-02-11 | End: 2025-02-15 | Stop reason: HOSPADM

## 2025-02-11 RX ORDER — LEVOTHYROXINE SODIUM 75 UG/1
75 TABLET ORAL
Status: DISCONTINUED | OUTPATIENT
Start: 2025-02-11 | End: 2025-02-15 | Stop reason: HOSPADM

## 2025-02-11 RX ORDER — MAGNESIUM SULFATE HEPTAHYDRATE 40 MG/ML
2 INJECTION, SOLUTION INTRAVENOUS ONCE
Status: COMPLETED | OUTPATIENT
Start: 2025-02-11 | End: 2025-02-11

## 2025-02-11 RX ORDER — VENLAFAXINE 37.5 MG/1
75 TABLET ORAL DAILY
Status: DISCONTINUED | OUTPATIENT
Start: 2025-02-11 | End: 2025-02-15 | Stop reason: HOSPADM

## 2025-02-11 RX ORDER — FLUTICASONE PROPIONATE 50 MCG
2 SPRAY, SUSPENSION (ML) NASAL DAILY
Status: DISCONTINUED | OUTPATIENT
Start: 2025-02-11 | End: 2025-02-15 | Stop reason: HOSPADM

## 2025-02-11 RX ORDER — INSULIN LISPRO 100 [IU]/ML
7 INJECTION, SOLUTION INTRAVENOUS; SUBCUTANEOUS
Status: DISCONTINUED | OUTPATIENT
Start: 2025-02-11 | End: 2025-02-14

## 2025-02-11 RX ORDER — SENNOSIDES 8.6 MG
1 TABLET ORAL DAILY
Status: DISCONTINUED | OUTPATIENT
Start: 2025-02-11 | End: 2025-02-15 | Stop reason: HOSPADM

## 2025-02-11 RX ORDER — ACETAMINOPHEN 325 MG/1
650 TABLET ORAL EVERY 6 HOURS PRN
Status: DISCONTINUED | OUTPATIENT
Start: 2025-02-11 | End: 2025-02-15 | Stop reason: HOSPADM

## 2025-02-11 RX ORDER — SODIUM CHLORIDE, SODIUM LACTATE, POTASSIUM CHLORIDE, CALCIUM CHLORIDE 600; 310; 30; 20 MG/100ML; MG/100ML; MG/100ML; MG/100ML
125 INJECTION, SOLUTION INTRAVENOUS CONTINUOUS
Status: DISPENSED | OUTPATIENT
Start: 2025-02-11 | End: 2025-02-11

## 2025-02-11 RX ORDER — HEPARIN SODIUM 5000 [USP'U]/ML
5000 INJECTION, SOLUTION INTRAVENOUS; SUBCUTANEOUS EVERY 8 HOURS SCHEDULED
Status: DISCONTINUED | OUTPATIENT
Start: 2025-02-11 | End: 2025-02-14

## 2025-02-11 RX ORDER — PRAVASTATIN SODIUM 40 MG
40 TABLET ORAL
Status: DISCONTINUED | OUTPATIENT
Start: 2025-02-11 | End: 2025-02-15 | Stop reason: HOSPADM

## 2025-02-11 RX ORDER — INSULIN LISPRO 100 [IU]/ML
1-5 INJECTION, SOLUTION INTRAVENOUS; SUBCUTANEOUS
Status: DISCONTINUED | OUTPATIENT
Start: 2025-02-11 | End: 2025-02-15 | Stop reason: HOSPADM

## 2025-02-11 RX ORDER — GUAIFENESIN 600 MG/1
1200 TABLET, EXTENDED RELEASE ORAL 2 TIMES DAILY
Status: DISCONTINUED | OUTPATIENT
Start: 2025-02-11 | End: 2025-02-15 | Stop reason: HOSPADM

## 2025-02-11 RX ORDER — INSULIN GLARGINE 100 [IU]/ML
20 INJECTION, SOLUTION SUBCUTANEOUS
Status: DISCONTINUED | OUTPATIENT
Start: 2025-02-11 | End: 2025-02-13

## 2025-02-11 RX ADMIN — SODIUM CHLORIDE, SODIUM LACTATE, POTASSIUM CHLORIDE, AND CALCIUM CHLORIDE 125 ML/HR: .6; .31; .03; .02 INJECTION, SOLUTION INTRAVENOUS at 14:34

## 2025-02-11 RX ADMIN — INSULIN LISPRO 3 UNITS: 100 INJECTION, SOLUTION INTRAVENOUS; SUBCUTANEOUS at 18:38

## 2025-02-11 RX ADMIN — MAGNESIUM SULFATE HEPTAHYDRATE 2 G: 40 INJECTION, SOLUTION INTRAVENOUS at 14:28

## 2025-02-11 RX ADMIN — ISOSORBIDE MONONITRATE 60 MG: 60 TABLET, EXTENDED RELEASE ORAL at 14:39

## 2025-02-11 RX ADMIN — LOSARTAN POTASSIUM 100 MG: 50 TABLET, FILM COATED ORAL at 14:41

## 2025-02-11 RX ADMIN — SODIUM CHLORIDE, SODIUM LACTATE, POTASSIUM CHLORIDE, AND CALCIUM CHLORIDE 1000 ML: .6; .31; .03; .02 INJECTION, SOLUTION INTRAVENOUS at 14:27

## 2025-02-11 RX ADMIN — HEPARIN SODIUM 5000 UNITS: 5000 INJECTION INTRAVENOUS; SUBCUTANEOUS at 14:38

## 2025-02-11 RX ADMIN — INSULIN GLARGINE 20 UNITS: 100 INJECTION, SOLUTION SUBCUTANEOUS at 21:16

## 2025-02-11 RX ADMIN — SODIUM CHLORIDE 250 ML: 0.9 INJECTION, SOLUTION INTRAVENOUS at 08:01

## 2025-02-11 RX ADMIN — INSULIN HUMAN 4 UNITS: 100 INJECTION, SOLUTION PARENTERAL at 08:30

## 2025-02-11 RX ADMIN — PRAVASTATIN SODIUM 40 MG: 40 TABLET ORAL at 18:37

## 2025-02-11 RX ADMIN — GUAIFENESIN 1200 MG: 600 TABLET, EXTENDED RELEASE ORAL at 18:37

## 2025-02-11 RX ADMIN — HEPARIN SODIUM 5000 UNITS: 5000 INJECTION INTRAVENOUS; SUBCUTANEOUS at 21:16

## 2025-02-11 RX ADMIN — GUAIFENESIN 600 MG: 600 TABLET, EXTENDED RELEASE ORAL at 14:37

## 2025-02-11 RX ADMIN — INSULIN LISPRO 4 UNITS: 100 INJECTION, SOLUTION INTRAVENOUS; SUBCUTANEOUS at 14:32

## 2025-02-11 RX ADMIN — CEFTRIAXONE 1000 MG: 10 INJECTION, POWDER, FOR SOLUTION INTRAVENOUS at 08:01

## 2025-02-11 RX ADMIN — VENLAFAXINE 75 MG: 37.5 TABLET ORAL at 14:39

## 2025-02-11 RX ADMIN — MAGNESIUM SULFATE HEPTAHYDRATE 2 G: 40 INJECTION, SOLUTION INTRAVENOUS at 08:04

## 2025-02-11 RX ADMIN — INSULIN LISPRO 7 UNITS: 100 INJECTION, SOLUTION INTRAVENOUS; SUBCUTANEOUS at 18:38

## 2025-02-11 RX ADMIN — LEVOTHYROXINE SODIUM 75 MCG: 75 TABLET ORAL at 14:40

## 2025-02-11 RX ADMIN — AZITHROMYCIN MONOHYDRATE 500 MG: 500 INJECTION, POWDER, LYOPHILIZED, FOR SOLUTION INTRAVENOUS at 08:32

## 2025-02-11 RX ADMIN — INSULIN LISPRO 1 UNITS: 100 INJECTION, SOLUTION INTRAVENOUS; SUBCUTANEOUS at 22:13

## 2025-02-11 NOTE — LETTER
February 15, 2025     Sukumar Clemens DO  [address] [place] [id number]    Patient: Yajaira Marsh   YOB: 1945   Date of Visit: 2/11/2025       Dear Dr. Clemens:    Thank you for referring Yajaira Marsh to me for evaluation. Below are my notes for this consultation.    If you have questions, please do not hesitate to call me. I look forward to following your patient along with you.         Sincerely,        No name on file        CC: No Recipients    Viry Carlos MD  2/14/2025 11:53 AM  Attested  Progress Note - Hospitalist   Name: Yajaira Marsh 79 y.o. female I MRN: [id number]  Unit/Bed#: W -01 I Date of Admission: 2/11/2025   Date of Service: 2/14/2025 I Hospital Day: 3    Assessment & Plan  Community acquired pneumonia  Acute cough starting approximately 1 week prior to presentation, nonproductive  Tachypneic on exam, satting well on room air  Patient meeting sepsis criteria for transient tachycardia (101), tachypnea to 28, and leukocytosis (17.82), though tachycardia rapidly resolved.   Labs notable for leukocytosis and elevated Procal of 2.56, CRP elevated at 201.1  Repeat Pro-Tee at 1.59 from 2.56  Lactic acid within normal limits  CT chest notable for scattered groundglass opacities and tree-in-bud opacities within both lower lobes and most significant in the right middle lobe, suspicious for atypical infection.  Meeting criteria for severe community-acquired pneumonia on the basis of 3 minor criteria: Multilobar opacities, confusion/disorientation, BUN greater than 20  Drip score 0  Blood culture no growth at 48 hours   Per speech and language pathologist recommends to consider VBS to assess for aspiration risk giving patient has mild oropharyngeal dysphagia.  At this time recommend patient's diet-regular diet with thin liquid  VBS -unremarkable.  Continue regular diet with thin liquid.  No straws  Received ceftriaxone and azithromycin in the ED      Plan:  Continue ceftriaxone 1 g every 24 hours (4/5 days)  Continue azithromycin 500 mg every 24 hours (4/5 days)  Patient is not requiring oxygen but 94% on room  Isolyte 75 cc/h for 12 hours for orthostatic blood pressure  Toxic metabolic encephalopathy  Patient acutely altered starting night prior to presentation  Suspect acute toxic metabolic encephalopathy secondary to acute infectious process  Treat community-acquired pneumonia as above  Avoid sedating medications as able  Monitor mentation  Type 2 diabetes mellitus with complication, with long-term current use of insulin ([facility])  Lab Results   Component Value Date    HGBA1C 12.4 (A) 02/10/2025     Recent Labs     02/13/25  1104 02/13/25  1542 02/13/25  2109 02/14/25  0708   POCGLU 206* 164* 107 156*   Blood Sugar Average: Last 72 hrs:  (P) 226.8    Recent A1c of 12.4 indicates poorly controlled type 2 diabetes  Patient's  reports sporadic compliance with home insulin regimen  Home regimen insulin aspart 18 units twice daily with meals and Toujeo 62 units QAM  Due to noncompliance, unclear what patient's true insulin requirements are, as home regimen is significantly more than would typically be required for somebody of patient's weight  Suspect hyperglycemia at presentation is multifactorial due to poorly controlled type 2 diabetes mellitus and acute infection     Plan:  Continue Lantus at 24 units nightly   If blood glucose continues to be elevated, will consider starting Lantus 30 unit nightly.  Increase lispro to 9 units from 7 units 3 times daily with meals  Start sliding scale insulin  Avoid hypoglycemia per protocol  Monitor blood glucose and insulin requirements and adjust regimen as indicated  Fall  Unclear mechanism, but occurred overnight getting up from bed in setting of acute infectious process  Patient unsure whether she has had any recent medication changes, longstanding prescription for Xanax as needed  Per patient's spouse, patient  walks without assistive device at baseline  Orthostatic blood pressure positive, repeat orthostatic blood pressure today is positive    Plan:  Isolyte 75 cc/h for 12 hours for orthostatic blood pressure  Thigh-high compression stocking  PT/OT eval and treat  Restart as-needed Xanax as needed given patient is A&Ox3 this morning  Can discuss trial weaning of benzodiazepines outpatient prior discharge    Stage 3b chronic kidney disease (HCC)  Lab Results   Component Value Date    EGFR 39 02/14/2025    EGFR 45 02/13/2025    EGFR 39 02/12/2025    CREATININE 1.30 02/14/2025    CREATININE 1.15 02/13/2025    CREATININE 1.29 02/12/2025   Baseline creatinine appears to be 1.5-1.7  Avoid nephrotoxins  Monitor renal indices on BMP    VTE Pharmacologic Prophylaxis: VTE Score: 8 High Risk (Score >/= 5) - Pharmacological DVT Prophylaxis Ordered: enoxaparin (Lovenox). Sequential Compression Devices Ordered.    Mobility:   Basic Mobility Inpatient Raw Score: 18  JH-HLM Goal: 6: Walk 10 steps or more  JH-HLM Achieved: 4: Move to chair/commode  JH-HLM Goal NOT achieved. Continue with multidisciplinary rounding and encourage appropriate mobility to improve upon JH-HLM goals.    Patient Centered Rounds: I performed bedside rounds with nursing staff today.   Discussions with Specialists or Other Care Team Provider: Speech and language pathologist    Education and Discussions with Family / Patient: Updated  (daughter) via phone.    Current Length of Stay: 3 day(s)  Current Patient Status: Inpatient   Certification Statement: The patient will continue to require additional inpatient hospital stay due to community-acquired pneumonia  Discharge Plan: Anticipate discharge in 24-48 hrs to discharge location to be determined pending rehab evaluations.    Code Status: Level 1 - Full Code    Subjective  Patient is seen and examined by me at bedside.  Patient is ANO x 2 to self and time. Patient is very calm. Patient reports that she  has hard time falling asleep at new place..  Other than the patient has no issues or significant event overnight.  Patient is breathing okay, denies shortness of breath and on room air satting 95%.  Patient is tolerating oral diet.  Patient is denying chest pain, shortness of breath, abdominal pain, fever, chill, changes with urination or bowel movement.    Objective:  Temp:  [98.1 °F (36.7 °C)-98.9 °F (37.2 °C)] 98.9 °F (37.2 °C)  HR:  [75-91] 91  BP: ()/(56-60) 102/60  Resp:  [16-18] 16  SpO2:  [94 %-96 %] 94 %  O2 Device: None (Room air)    Body mass index is 35.04 kg/m².     Input and Output Summary (last 24 hours):     Intake/Output Summary (Last 24 hours) at 2/14/2025 0827  Last data filed at 2/13/2025 1700  Gross per 24 hour   Intake 360 ml   Output --   Net 360 ml       Physical Exam  Vitals reviewed.   Constitutional:       Appearance: Normal appearance.   Eyes:      General: No scleral icterus.        Right eye: No discharge.         Left eye: No discharge.   Cardiovascular:      Rate and Rhythm: Normal rate and regular rhythm.      Pulses: Normal pulses.      Heart sounds: Normal heart sounds. No murmur heard.     No friction rub.   Pulmonary:      Effort: Pulmonary effort is normal. No respiratory distress.      Breath sounds: Normal breath sounds. No stridor. No wheezing or rales.   Chest:      Chest wall: No tenderness.   Abdominal:      General: Bowel sounds are normal. There is no distension.      Palpations: Abdomen is soft.      Tenderness: There is no abdominal tenderness. There is no guarding.   Musculoskeletal:         General: Normal range of motion.      Cervical back: Normal range of motion.      Right lower leg: No edema.      Left lower leg: No edema.   Skin:     General: Skin is warm.      Capillary Refill: Capillary refill takes less than 2 seconds.      Coloration: Skin is not jaundiced or pale.   Neurological:      General: No focal deficit present.      Mental Status: She is  alert and oriented to person, place, and time. Mental status is at baseline.   Psychiatric:         Mood and Affect: Mood normal.         Behavior: Behavior normal.         Thought Content: Thought content normal.         Judgment: Judgment normal.         Lines/Drains:              Lab Results: I have reviewed the following results:   Results from last 7 days   Lab Units 02/14/25  0630 02/12/25  0550 02/11/25  0531   WBC Thousand/uL 10.96*   < > 17.82*   HEMOGLOBIN g/dL 9.8*   < > 10.5*   HEMATOCRIT % 30.0*   < > 32.2*   PLATELETS Thousands/uL 217   < > 197   BANDS PCT %  --   --  7   SEGS PCT % 76*   < >  --    LYMPHO PCT % 14   < > 9*   MONO PCT % 8   < > 2*   EOS PCT % 1   < > 0    < > = values in this interval not displayed.     Results from last 7 days   Lab Units 02/14/25  0630 02/11/25  1212 02/11/25  0531   SODIUM mmol/L 134*   < > 134*   POTASSIUM mmol/L 4.1   < > 5.5*   CHLORIDE mmol/L 104   < > 103   CO2 mmol/L 23   < > 21   BUN mg/dL 14   < > 31*   CREATININE mg/dL 1.30   < > 1.65*   ANION GAP mmol/L 7   < > 10   CALCIUM mg/dL 8.8   < > 9.2   ALBUMIN g/dL  --   --  3.8   TOTAL BILIRUBIN mg/dL  --   --  0.63   ALK PHOS U/L  --   --  102   ALT U/L  --   --  18   AST U/L  --   --  18   GLUCOSE RANDOM mg/dL 149*   < > 469*    < > = values in this interval not displayed.     Results from last 7 days   Lab Units 02/11/25  0656   INR  1.12     Results from last 7 days   Lab Units 02/14/25  0708 02/13/25  2109 02/13/25  1542 02/13/25  1104 02/13/25  0709 02/12/25  2107 02/12/25  1616 02/12/25  1118 02/12/25  0905 02/11/25  2152 02/11/25  1729 02/11/25  1257   POC GLUCOSE mg/dl 156* 107 164* 206* 167* 99 220* 229* 168* 157* 246* 277*     Results from last 7 days   Lab Units 02/10/25  0932   HEMOGLOBIN A1C  12.4*     Results from last 7 days   Lab Units 02/13/25  0000 02/12/25  0550 02/11/25  0656 02/11/25  0531   LACTIC ACID mmol/L  --   --  2.0  --    PROCALCITONIN ng/ml 1.59* 2.92*  --  2.56*       Recent  Cultures (last 7 days):   Results from last 7 days   Lab Units 02/11/25  0656 02/11/25  0548   BLOOD CULTURE  No Growth at 48 hrs. No Growth at 48 hrs.       Imaging Results Review: I reviewed radiology reports from this admission including: CT chest.  Other Study Results Review: No additional pertinent studies reviewed.    Last 24 Hours Medication List:     Current Facility-Administered Medications:   •  acetaminophen (TYLENOL) tablet 650 mg, Q6H PRN  •  ALPRAZolam (XANAX) tablet 0.5 mg, BID PRN  •  azithromycin (ZITHROMAX) tablet 500 mg, Q24H  •  benzonatate (TESSALON PERLES) capsule 100 mg, TID PRN  •  ceftriaxone (ROCEPHIN) 1 g/50 mL in dextrose IVPB, Q24H, Last Rate: 1,000 mg (02/13/25 0833) **AND** [DISCONTINUED] azithromycin (ZITHROMAX) 500 mg in sodium chloride 0.9% 250mL IVPB 500 mg, Q24H, Last Rate: Stopped (02/13/25 1132)  •  diltiazem (CARDIZEM CD) 24 hr capsule 300 mg, Daily  •  enoxaparin (LOVENOX) subcutaneous injection 40 mg, Q24H [place]  •  fluticasone (FLONASE) 50 mcg/act nasal spray 2 spray, Daily  •  guaiFENesin (MUCINEX) 12 hr tablet 1,200 mg, BID  •  insulin glargine (LANTUS) subcutaneous injection 24 Units 0.24 mL, HS  •  insulin lispro (HumALOG/ADMELOG) 100 units/mL subcutaneous injection 1-5 Units, HS  •  insulin lispro (HumALOG/ADMELOG) 100 units/mL subcutaneous injection 1-6 Units, TID AC **AND** Fingerstick Glucose (POCT), TID AC  •  insulin lispro (HumALOG/ADMELOG) 100 units/mL subcutaneous injection 7 Units, TID With Meals  •  isosorbide mononitrate (IMDUR) 24 hr tablet 60 mg, Daily  •  labetalol (NORMODYNE) injection 10 mg, Q6H PRN  •  levothyroxine tablet 75 mcg, Early Morning  •  losartan (COZAAR) tablet 100 mg, Daily  •  magnesium sulfate 2 g/50 mL IVPB (premix) 2 g, Once  •  melatonin tablet 3 mg, HS PRN  •  nystatin (MYCOSTATIN) cream, BID  •  ondansetron (ZOFRAN) injection 4 mg, Q6H PRN  •  pravastatin (PRAVACHOL) tablet 40 mg, Daily With Dinner  •  senna (SENOKOT) tablet 8.6 mg,  Daily  •  venlafaxine (EFFEXOR) tablet 75 mg, Daily    Administrative Statements  Today, Patient Was Seen By: Viry Carlos MD      **Please Note: This note may have been constructed using a voice recognition system.**  Attestation signed by Indiana Sanderson MD at 2/15/2025  8:48 AM:  [facility] Hospitalist Service Attending Physician Attestation Note - Progress Note    I have seen and examined Yajaira Marsh personally and have reviewed the medical record independently.  I have reviewed the case with the resident physician including all assessments and the plan of care for each.  I agree with the resident physician and offer the following addendum to the below statements by the resident physician:     Date Evaluated: 2/14/25    79 year old female with history of type 2 DM, hypertension who is being treated for communicate acquired pneumonia. Reports dry cough. On exam, breath sounds bilaterally equal. Soft non tender abdomen. Appears dry on exam    Leukocytosis and procalcitonin trending down.  Orthostatic vitals positive for hypotension.     Continue with iv ceftriaxone and azithromycin for pneumonia for 5 day course. IVF given orthostatic hypotension along with compression stocking. Obtain sputum culture if able. Symptomatic management for cough. . Diet per swallow team. Titrate insulin dosing according to blood glucose, on basal bolus regimen. Incentive spirometry. PT OT evaluation pending. Likely discharge tomorrow pending PT evaluation and IVF today given hypotension.    For detailed history, assessment, and plan of care, please review the statements below by the resident .    MD Viry Ayoub MD  2/13/2025  3:50 PM  Attested  Progress Note - Hospitalist   Name: Yajaira Marsh 79 y.o. female I MRN: [id number]  Unit/Bed#: W -01 I Date of Admission: 2/11/2025   Date of Service: 2/13/2025 I Hospital Day: 2    Assessment & Plan  Community acquired pneumonia  Acute cough  starting approximately 1 week prior to presentation, nonproductive  Tachypneic on exam, satting well on room air  Patient meeting sepsis criteria for transient tachycardia (101), tachypnea to 28, and leukocytosis (17.82), though tachycardia rapidly resolved.   Labs notable for leukocytosis and elevated Procal of 2.56, CRP elevated at 201.1  Lactic acid within normal limits  CT chest notable for scattered groundglass opacities and tree-in-bud opacities within both lower lobes and most significant in the right middle lobe, suspicious for atypical infection.  Meeting criteria for severe community-acquired pneumonia on the basis of 3 minor criteria: Multilobar opacities, confusion/disorientation, BUN greater than 20  Drip score 0  Repeat procal trending down  Per speech and language pathologist recommends to consider VBS to assess for aspiration risk giving patient has mild oropharyngeal dysphagia.  At this time recommend patient's diet-regular diet with thin liquid  VBS -unremarkable.  Continue regular diet with thin liquid.  No straws  Received ceftriaxone and azithromycin in the ED     Plan:  Continue ceftriaxone 1 g every 24 hours  Continue azithromycin 500 mg every 24 hours  Follow up AM procal tomorrow morning  and CBC with differential   Toxic metabolic encephalopathy  Patient acutely altered starting night prior to presentation  Suspect acute toxic metabolic encephalopathy secondary to acute infectious process  Treat community-acquired pneumonia as above  Avoid sedating medications as able  Monitor mentation  Type 2 diabetes mellitus with complication, with long-term current use of insulin ([facility])  Lab Results   Component Value Date    HGBA1C 12.4 (A) 02/10/2025     Recent Labs     02/12/25  1616 02/12/25  2107 02/13/25  0709 02/13/25  1104   POCGLU 220* 99 167* 206*   Blood Sugar Average: Last 72 hrs:  (P) [id number]    Recent A1c of 12.4 indicates poorly controlled type 2 diabetes  Patient's   reports sporadic compliance with home insulin regimen  Home regimen insulin aspart 18 units twice daily with meals and Toujeo 62 units QAM  Due to noncompliance, unclear what patient's true insulin requirements are, as home regimen is significantly more than would typically be required for somebody of patient's weight  Suspect hyperglycemia at presentation is multifactorial due to poorly controlled type 2 diabetes mellitus and acute infection     Plan:  Increase Lantus from 20 units to 24 units nightly   If blood glucose continues to be elevated, will consider starting Lantus 30 unit nightly.  Start lispro 7 units 3 times daily with meals  Start sliding scale insulin  Avoid hypoglycemia per protocol  Monitor blood glucose and insulin requirements and adjust regimen as indicated  Fall  Unclear mechanism, but occurred overnight getting up from bed in setting of acute infectious process  Patient unsure whether she has had any recent medication changes, longstanding prescription for Xanax as needed  Per patient's spouse, patient walks without assistive device at baseline    Plan:  Check orthostatic vital signs  PT/OT eval and treat  Restart as-needed Xanax given patient is A&Ox3 this morning  Can discuss trial weaning of benzodiazepines outpatient prior discharge    Hypertension  Continue home regimen diltiazem 300 mg daily  Continue  and losartan 100 mg daily   Hypothyroidism  TSH 2.490 wnl,   Continue home levothyroxine 75 mcg daily in early morning  Stage 3b chronic kidney disease (HCC)  Lab Results   Component Value Date    EGFR 45 02/13/2025    EGFR 39 02/12/2025    EGFR 31 02/11/2025    CREATININE 1.15 02/13/2025    CREATININE 1.29 02/12/2025    CREATININE 1.58 (H) 02/11/2025   Baseline creatinine appears to be 1.5-1.7  Avoid nephrotoxins  Monitor renal indices on BMP    VTE Pharmacologic Prophylaxis: VTE Score: 8 High Risk (Score >/= 5) - Pharmacological DVT Prophylaxis Ordered: heparin. Sequential Compression  Devices Ordered.    Mobility:   Basic Mobility Inpatient Raw Score: 18  JH-HLM Goal: 6: Walk 10 steps or more  JH-HLM Achieved: 6: Walk 10 steps or more  JH-HLM Goal NOT achieved. Continue with multidisciplinary rounding and encourage appropriate mobility to improve upon JH-HLM goals.    Patient Centered Rounds: I performed bedside rounds with nursing staff today.   Discussions with Specialists or Other Care Team Provider: Speech and language pathologist    Education and Discussions with Family / Patient: Updated  (daughter) via phone.    Current Length of Stay: 2 day(s)  Current Patient Status: Inpatient   Certification Statement: The patient will continue to require additional inpatient hospital stay due to community-acquired pneumonia  Discharge Plan: Anticipate discharge in 24-48 hrs to discharge location to be determined pending rehab evaluations.    Code Status: Level 1 - Full Code    Subjective  Patient is seen and examined by me at bedside.  Patient is ANO x 3.  Patient has no acute complaint no significant event overnight.  Patient is breathing okay, denies shortness of breath and on room air satting 95%. Patient is denying chest pain, shortness of breath, abdominal pain, fever, chill, changes with urination or bowel movement.    Objective:  Temp:  [98.1 °F (36.7 °C)-100.6 °F (38.1 °C)] 98.1 °F (36.7 °C)  HR:  [75-97] 75  BP: ()/(56-74) 147/56  Resp:  [18-20] 18  SpO2:  [94 %-100 %] 95 %  O2 Device: None (Room air)    Body mass index is 34.03 kg/m².     Input and Output Summary (last 24 hours):     Intake/Output Summary (Last 24 hours) at 2/13/2025 1540  Last data filed at 2/13/2025 1318  Gross per 24 hour   Intake 850 ml   Output --   Net 850 ml       Physical Exam  Vitals reviewed.   Constitutional:       Appearance: Normal appearance.   Eyes:      General: No scleral icterus.        Right eye: No discharge.         Left eye: No discharge.   Cardiovascular:      Rate and Rhythm: Normal  rate and regular rhythm.      Pulses: Normal pulses.      Heart sounds: Normal heart sounds. No murmur heard.     No friction rub.   Pulmonary:      Effort: Pulmonary effort is normal. No respiratory distress.      Breath sounds: Normal breath sounds. No stridor. No wheezing or rales.   Chest:      Chest wall: No tenderness.   Abdominal:      General: Bowel sounds are normal. There is no distension.      Palpations: Abdomen is soft.      Tenderness: There is no abdominal tenderness. There is no guarding.   Musculoskeletal:         General: Normal range of motion.      Cervical back: Normal range of motion.      Right lower leg: No edema.      Left lower leg: No edema.   Skin:     General: Skin is warm.      Capillary Refill: Capillary refill takes less than 2 seconds.      Coloration: Skin is not jaundiced or pale.   Neurological:      General: No focal deficit present.      Mental Status: She is alert and oriented to person, place, and time. Mental status is at baseline.   Psychiatric:         Mood and Affect: Mood normal.         Behavior: Behavior normal.         Thought Content: Thought content normal.         Judgment: Judgment normal.         Lines/Drains:              Lab Results: I have reviewed the following results:   Results from last 7 days   Lab Units 02/13/25  0524 02/12/25  0550 02/11/25  0531   WBC Thousand/uL 15.40*   < > 17.82*   HEMOGLOBIN g/dL 9.9*   < > 10.5*   HEMATOCRIT % 29.9*   < > 32.2*   PLATELETS Thousands/uL 191   < > 197   BANDS PCT %  --   --  7   SEGS PCT % 82*  --   --    LYMPHO PCT % 9*  --  9*   MONO PCT % 7  --  2*   EOS PCT % 1  --  0    < > = values in this interval not displayed.     Results from last 7 days   Lab Units 02/13/25  0000 02/11/25  1212 02/11/25  0531   SODIUM mmol/L 134*   < > 134*   POTASSIUM mmol/L 3.8   < > 5.5*   CHLORIDE mmol/L 103   < > 103   CO2 mmol/L 23   < > 21   BUN mg/dL 12   < > 31*   CREATININE mg/dL 1.15   < > 1.65*   ANION GAP mmol/L 8   < > 10    CALCIUM mg/dL 9.1   < > 9.2   ALBUMIN g/dL  --   --  3.8   TOTAL BILIRUBIN mg/dL  --   --  0.63   ALK PHOS U/L  --   --  102   ALT U/L  --   --  18   AST U/L  --   --  18   GLUCOSE RANDOM mg/dL 178*   < > 469*    < > = values in this interval not displayed.     Results from last 7 days   Lab Units 02/11/25  0656   INR  1.12     Results from last 7 days   Lab Units 02/13/25  1104 02/13/25  0709 02/12/25  2107 02/12/25  1616 02/12/25  1118 02/12/25  0905 02/11/25  2152 02/11/25  1729 02/11/25  1257 02/11/25  0942 02/11/25  0819 02/11/25  0513   POC GLUCOSE mg/dl 206* 167* 99 220* 229* 168* 157* 246* 277* 371* 406* 429*     Results from last 7 days   Lab Units 02/10/25  0932   HEMOGLOBIN A1C  12.4*     Results from last 7 days   Lab Units 02/13/25  0000 02/12/25  0550 02/11/25  0656 02/11/25  0531   LACTIC ACID mmol/L  --   --  2.0  --    PROCALCITONIN ng/ml 1.59* 2.92*  --  2.56*       Recent Cultures (last 7 days):   Results from last 7 days   Lab Units 02/11/25  0656 02/11/25  0548   BLOOD CULTURE  No Growth at 24 hrs. No Growth at 48 hrs.       Imaging Results Review: I reviewed radiology reports from this admission including: CT chest.  Other Study Results Review: No additional pertinent studies reviewed.    Last 24 Hours Medication List:     Current Facility-Administered Medications:   •  acetaminophen (TYLENOL) tablet 650 mg, Q6H PRN  •  ALPRAZolam (XANAX) tablet 0.5 mg, BID PRN  •  azithromycin (ZITHROMAX) tablet 500 mg, Q24H  •  benzonatate (TESSALON PERLES) capsule 100 mg, TID PRN  •  ceftriaxone (ROCEPHIN) 1 g/50 mL in dextrose IVPB, Q24H, Last Rate: 1,000 mg (02/13/25 0833) **AND** [DISCONTINUED] azithromycin (ZITHROMAX) 500 mg in sodium chloride 0.9% 250mL IVPB 500 mg, Q24H, Last Rate: Stopped (02/13/25 1132)  •  diltiazem (CARDIZEM CD) 24 hr capsule 300 mg, Daily  •  fluticasone (FLONASE) 50 mcg/act nasal spray 2 spray, Daily  •  guaiFENesin (MUCINEX) 12 hr tablet 1,200 mg, BID  •  heparin (porcine)  subcutaneous injection 5,000 Units, Q8H [place]  •  insulin glargine (LANTUS) subcutaneous injection 24 Units 0.24 mL, HS  •  insulin lispro (HumALOG/ADMELOG) 100 units/mL subcutaneous injection 1-5 Units, HS  •  insulin lispro (HumALOG/ADMELOG) 100 units/mL subcutaneous injection 1-6 Units, TID AC **AND** Fingerstick Glucose (POCT), TID AC  •  insulin lispro (HumALOG/ADMELOG) 100 units/mL subcutaneous injection 7 Units, TID With Meals  •  isosorbide mononitrate (IMDUR) 24 hr tablet 60 mg, Daily  •  labetalol (NORMODYNE) injection 10 mg, Q6H PRN  •  levothyroxine tablet 75 mcg, Early Morning  •  losartan (COZAAR) tablet 100 mg, Daily  •  melatonin tablet 3 mg, HS PRN  •  nystatin (MYCOSTATIN) cream, BID  •  ondansetron (ZOFRAN) injection 4 mg, Q6H PRN  •  pravastatin (PRAVACHOL) tablet 40 mg, Daily With Dinner  •  senna (SENOKOT) tablet 8.6 mg, Daily  •  venlafaxine (EFFEXOR) tablet 75 mg, Daily    Administrative Statements  Today, Patient Was Seen By: Viry Carlos MD      **Please Note: This note may have been constructed using a voice recognition system.**  Attestation signed by Indiana Sanderson MD at 2/15/2025  8:47 AM:  [facility] Service Attending Physician Attestation Note - Progress Note    I have seen and examined Yajaira Marsh personally and have reviewed the medical record independently.  I have reviewed the case with the resident physician including all assessments and the plan of care for each.  I agree with the resident physician and offer the following addendum to the below statements by the resident physician:     Date Evaluated: 2/13/25    79 year old female with history of type 2 DM, hypertension who is being treated for communicate acquired pneumonia. Reports dry cough. On exam, breath sounds bilaterally equal. Soft non tender abdomen.   Leukocytosis and procalcitonin trending down.    Continue with iv ceftriaxone and azithromycin for pneumonia for 5 day course. Obtain sputum culture  if able. Symptomatic management for cough. Check orthostatic vitals. Diet per swallow team l. Titrate insulin dosing according to blood glucose, on basal bolus regimen. Incentive spirometry. PT OT evaluation pending.     For detailed history, assessment, and plan of care, please review the statements below by the resident .    Indiana Sanderson MD     Background, Aidin Cm  2/12/2025 12:35 PM  Signed  Patient:    MRN:  [id number]    Aidin Request ID:  [id number]    Level of care reserved:  Assisted Living/Congregate    Partner Reserved:  [facility] [place] [address] [phone]    Clinical needs requested:    Geography searched:  10 miles around [id number]    Start of Service:    Request sent:  11:38am EST on 2/12/2025 by Perla Duncan    Partner reserved:  12:35pm EST on 2/12/2025 by Perla Duncan    Choice list shared:  12:34pm EST on 2/12/2025 by Perla Carlos MD  2/12/2025  3:43 PM  Attested  Progress Note - Hospitalist   Name: Yajaira Marsh 79 y.o. female I MRN: [id number]  Unit/Bed#: -01 I Date of Admission: 2/11/2025   Date of Service: 2/12/2025 I Hospital Day: 1    Assessment & Plan  Community acquired pneumonia  Acute cough starting approximately 1 week prior to presentation, nonproductive  Tachypneic on exam, satting well on room air  Patient meeting sepsis criteria for transient tachycardia (101), tachypnea to 28, and leukocytosis (17.82), though tachycardia rapidly resolved.   Labs notable for leukocytosis and elevated Procal of 2.56, CRP elevated at 201.1  Lactic acid within normal limits  CT chest notable for scattered groundglass opacities and tree-in-bud opacities within both lower lobes and most significant in the right middle lobe, suspicious for atypical infection.  Meeting criteria for severe community-acquired pneumonia on the basis of 3 minor criteria: Multilobar opacities, confusion/disorientation, BUN greater than 20  Drip  score 0  Per speech and language pathologist recommends to consider VBS to assess for aspiration risk giving patient has mild oropharyngeal dysphagia.  At this time recommend patient's diet-regular diet with thin liquid  Received ceftriaxone and azithromycin in the ED     Plan:  Continue ceftriaxone 1 g every 24 hours  Continue azithromycin 500 mg every 24 hours  Follow up AM procal tomorrow morning  and CBC with differential   Toxic metabolic encephalopathy  Patient acutely altered starting night prior to presentation  Suspect acute toxic metabolic encephalopathy secondary to acute infectious process  Treat community-acquired pneumonia as above  Avoid sedating medications as able  Monitor mentation  Type 2 diabetes mellitus with complication, with long-term current use of insulin ([facility])  Lab Results   Component Value Date    HGBA1C 12.4 (A) 02/10/2025     Recent Labs     02/11/25  1729 02/11/25  2152 02/12/25  0905 02/12/25  1118   POCGLU 246* 157* 168* 229*   Blood Sugar Average: Last 72 hrs:  (P) 285.375    Recent A1c of 12.4 indicates poorly controlled type 2 diabetes  Patient's  reports sporadic compliance with home insulin regimen  Home regimen insulin aspart 18 units twice daily with meals and Toujeo 62 units QAM  Due to noncompliance, unclear what patient's true insulin requirements are, as home regimen is significantly more than would typically be required for somebody of patient's weight  Suspect hyperglycemia at presentation is multifactorial due to poorly controlled type 2 diabetes mellitus and acute infection     Plan:  Start Lantus 20 units nightly   If blood glucose continues to be elevated, will consider starting Lantus 30 unit nightly.  Start lispro 7 units 3 times daily with meals  Start sliding scale insulin  Avoid hypoglycemia per protocol  Monitor blood glucose and insulin requirements and adjust regimen as indicated  Fall  Unclear mechanism, but occurred overnight getting up from bed in  setting of acute infectious process  Patient unsure whether she has had any recent medication changes, longstanding prescription for Xanax as needed  Per patient's spouse, patient walks without assistive device at baseline    Plan:  Check orthostatic vital signs  PT/OT eval and treat  Restart as-needed Xanax given patient is A&Ox3 this morning  Can discuss trial weaning of benzodiazepines outpatient prior discharge    Hypertension  Continue home regimen diltiazem 300 mg daily  Continue  and losartan 100 mg daily   Hypothyroidism  TSH 2.490 wnl,   Continue home levothyroxine 75 mcg daily in early morning  Hypomagnesemia  Mag 1.3 POA  Received magnesium 2 g IV in the ED  Give additional magnesium 2 g IV this afternoon  Repeat magnesium on a.m. labs is 2.0 wnl  Resolved     Plan:  Continue monitoring in AM labs     VTE Pharmacologic Prophylaxis: VTE Score: 8 High Risk (Score >/= 5) - Pharmacological DVT Prophylaxis Ordered: heparin. Sequential Compression Devices Ordered.    Mobility:   Basic Mobility Inpatient Raw Score: 18  JH-HLM Goal: 6: Walk 10 steps or more  JH-HLM Achieved: 4: Move to chair/commode  JH-HLM Goal NOT achieved. Continue with multidisciplinary rounding and encourage appropriate mobility to improve upon JH-HLM goals.    Patient Centered Rounds: I performed bedside rounds with nursing staff today.   Discussions with Specialists or Other Care Team Provider: Speech and language pathologist    Education and Discussions with Family / Patient: Updated  (daughter) via phone.    Current Length of Stay: 1 day(s)  Current Patient Status: Inpatient   Certification Statement: The patient will continue to require additional inpatient hospital stay due to community-acquired pneumonia  Discharge Plan: Anticipate discharge in 24-48 hrs to discharge location to be determined pending rehab evaluations.    Code Status: Level 1 - Full Code    Subjective  Patient is seen and examined by me at bedside.   Patient is ANO x 3.  Patient reports that she can recall the mechanical trip from yesterday.  Patient is denying head strike or loss of consciousness.  Patient is denying any pain at this point.  Patient has no acute complaint or significant event overnight.  Patient is denying chest pain, shortness of breath, abdominal pain, fever, chill, changes with urination or bowel movement.    Objective:  Temp:  [98.2 °F (36.8 °C)-98.4 °F (36.9 °C)] 98.4 °F (36.9 °C)  HR:  [] 94  BP: (137-193)/(65-79) 188/78  Resp:  [17-18] 18  SpO2:  [94 %-96 %] 94 %  O2 Device: None (Room air)    Body mass index is 34.19 kg/m².     Input and Output Summary (last 24 hours):     Intake/Output Summary (Last 24 hours) at 2/12/2025 1528  Last data filed at 2/12/2025 1400  Gross per 24 hour   Intake 1547 ml   Output 1183 ml   Net 364 ml       Physical Exam  Vitals reviewed.   Constitutional:       Appearance: Normal appearance.   Eyes:      General: No scleral icterus.        Right eye: No discharge.         Left eye: No discharge.   Cardiovascular:      Rate and Rhythm: Normal rate and regular rhythm.      Pulses: Normal pulses.      Heart sounds: Normal heart sounds. No murmur heard.     No friction rub.   Pulmonary:      Effort: Pulmonary effort is normal. No respiratory distress.      Breath sounds: Normal breath sounds. No stridor. No wheezing or rales.   Chest:      Chest wall: No tenderness.   Abdominal:      General: Bowel sounds are normal. There is no distension.      Palpations: Abdomen is soft.      Tenderness: There is no abdominal tenderness. There is no guarding.   Musculoskeletal:         General: Normal range of motion.      Cervical back: Normal range of motion.      Right lower leg: No edema.      Left lower leg: No edema.   Skin:     General: Skin is warm.      Capillary Refill: Capillary refill takes less than 2 seconds.      Coloration: Skin is not jaundiced or pale.   Neurological:      General: No focal deficit  present.      Mental Status: She is alert and oriented to person, place, and time. Mental status is at baseline.   Psychiatric:         Mood and Affect: Mood normal.         Behavior: Behavior normal.         Thought Content: Thought content normal.         Judgment: Judgment normal.         Lines/Drains:              Lab Results: I have reviewed the following results:   Results from last 7 days   Lab Units 02/12/25  0550 02/11/25  0531   WBC Thousand/uL 15.43* 17.82*   HEMOGLOBIN g/dL 11.0* 10.5*   HEMATOCRIT % 33.9* 32.2*   PLATELETS Thousands/uL 179 197   BANDS PCT %  --  7   LYMPHO PCT %  --  9*   MONO PCT %  --  2*   EOS PCT %  --  0     Results from last 7 days   Lab Units 02/12/25  0550 02/11/25  1212 02/11/25  0531   SODIUM mmol/L 135   < > 134*   POTASSIUM mmol/L 4.0   < > 5.5*   CHLORIDE mmol/L 105   < > 103   CO2 mmol/L 23   < > 21   BUN mg/dL 19   < > 31*   CREATININE mg/dL 1.29   < > 1.65*   ANION GAP mmol/L 7   < > 10   CALCIUM mg/dL 9.1   < > 9.2   ALBUMIN g/dL  --   --  3.8   TOTAL BILIRUBIN mg/dL  --   --  0.63   ALK PHOS U/L  --   --  102   ALT U/L  --   --  18   AST U/L  --   --  18   GLUCOSE RANDOM mg/dL 161*   < > 469*    < > = values in this interval not displayed.     Results from last 7 days   Lab Units 02/11/25  0656   INR  1.12     Results from last 7 days   Lab Units 02/12/25  1118 02/12/25  0905 02/11/25  2152 02/11/25  1729 02/11/25  1257 02/11/25  0942 02/11/25  0819 02/11/25  0513   POC GLUCOSE mg/dl 229* 168* 157* 246* 277* 371* 406* 429*     Results from last 7 days   Lab Units 02/10/25  0932   HEMOGLOBIN A1C  12.4*     Results from last 7 days   Lab Units 02/12/25  0550 02/11/25  0656 02/11/25  0531   LACTIC ACID mmol/L  --  2.0  --    PROCALCITONIN ng/ml 2.92*  --  2.56*       Recent Cultures (last 7 days):   Results from last 7 days   Lab Units 02/11/25  0656 02/11/25  0548   BLOOD CULTURE  Received in Microbiology Lab. Culture in Progress. No Growth at 24 hrs.       Imaging  Results Review: I reviewed radiology reports from this admission including: CT chest.  Other Study Results Review: No additional pertinent studies reviewed.    Last 24 Hours Medication List:     Current Facility-Administered Medications:   •  acetaminophen (TYLENOL) tablet 650 mg, Q6H PRN  •  ALPRAZolam (XANAX) tablet 0.5 mg, BID PRN  •  ceftriaxone (ROCEPHIN) 1 g/50 mL in dextrose IVPB, Q24H, Last Rate: 1,000 mg (02/12/25 0815) **AND** azithromycin (ZITHROMAX) 500 mg in sodium chloride 0.9% 250mL IVPB 500 mg, Q24H  •  benzonatate (TESSALON PERLES) capsule 100 mg, TID PRN  •  diltiazem (CARDIZEM CD) 24 hr capsule 300 mg, Daily  •  fluticasone (FLONASE) 50 mcg/act nasal spray 2 spray, Daily  •  guaiFENesin (MUCINEX) 12 hr tablet 1,200 mg, BID  •  heparin (porcine) subcutaneous injection 5,000 Units, Q8H [place]  •  insulin glargine (LANTUS) subcutaneous injection 20 Units 0.2 mL, HS  •  insulin lispro (HumALOG/ADMELOG) 100 units/mL subcutaneous injection 1-5 Units, HS  •  insulin lispro (HumALOG/ADMELOG) 100 units/mL subcutaneous injection 1-6 Units, TID AC **AND** Fingerstick Glucose (POCT), TID AC  •  insulin lispro (HumALOG/ADMELOG) 100 units/mL subcutaneous injection 7 Units, TID With Meals  •  isosorbide mononitrate (IMDUR) 24 hr tablet 60 mg, Daily  •  levothyroxine tablet 75 mcg, Early Morning  •  losartan (COZAAR) tablet 100 mg, Daily  •  nystatin (MYCOSTATIN) cream, BID  •  ondansetron (ZOFRAN) injection 4 mg, Q6H PRN  •  pravastatin (PRAVACHOL) tablet 40 mg, Daily With Dinner  •  senna (SENOKOT) tablet 8.6 mg, Daily  •  venlafaxine (EFFEXOR) tablet 75 mg, Daily    Administrative Statements  Today, Patient Was Seen By: Viry Carlos MD      **Please Note: This note may have been constructed using a voice recognition system.**  Attestation signed by Indiana Sanderson MD at 2/15/2025  8:46 AM:  [facility] Service Attending Physician Attestation Note - Progress Note    I have seen and examined Yajaira  Salma personally and have reviewed the medical record independently.  I have reviewed the case with the resident physician including all assessments and the plan of care for each.  I agree with the resident physician and offer the following addendum to the below statements by the resident physician:     Date Evaluated: 2/12/25    79 year old female with history of type 2 DM, hypertension who is being treated for communicate acquired pneumonia. Reports dry cough. No fever or chills. Reported some dizziness while changing position yesterday. No focal weakness of numbness. On exam, breath sounds bilaterally decreased. Soft non tender abdomen.   Continue with iv ceftriaxone and azithromycin for pneumonia. Trend procalcitonin and cbc with diff. Obtain sputum culture if able. Symptomatic management for cough. Check orthostatic vitals. PT OT evaluation. Swallow team recommends VBS. Titrate insulin dosing according to blood glucose, on basal bolus regimen. Incentive spirometry.     For detailed history, assessment, and plan of care, please review the statements below by the resident .    Indiana Sanderson MD

## 2025-02-11 NOTE — ASSESSMENT & PLAN NOTE
Unclear mechanism, but occurred overnight getting up from bed in setting of acute infectious process  Patient unsure whether she has had any recent medication changes, longstanding prescription for Xanax as needed  Per patient's spouse, patient walks without assistive device at baseline    Plan:  Check orthostatic vital signs  PT/OT eval and treat  Hold home as-needed Xanax while acutely altered  Can discuss trial weaning of benzodiazepines outpatient when patient mentating appropriately

## 2025-02-11 NOTE — ASSESSMENT & PLAN NOTE
5.5 POA, in the setting of hyperglycemia  Resolved on repeat labs following regular insulin 4 units and normal saline 250 mL

## 2025-02-11 NOTE — ASSESSMENT & PLAN NOTE
Lab Results   Component Value Date    HGBA1C 12.4 (A) 02/10/2025     Recent Labs     02/11/25  0513 02/11/25  0819 02/11/25  0942 02/11/25  1257   POCGLU 429* 406* 371* 277*   Blood Sugar Average: Last 72 hrs:  (P) 370.75    -Recent A1c of 12.4 indicates poorly controlled type 2 diabetes  -Patient's  reports sporadic compliance with home insulin regimen  -Home regimen insulin aspart 18 units twice daily with meals and Toujeo 62 units QAM  -Due to noncompliance, unclear what patient's true insulin requirements are, as home regimen is significantly more than would typically be required for somebody of patient's weight  -Suspect hyperglycemia at presentation is multifactorial due to poorly controlled type 2 diabetes mellitus and acute infection    Plan:  Start Lantus 20 units nightly  Start lispro 7 units 3 times daily with meals  Start sliding scale insulin  Avoid hypoglycemia per protocol  Monitor blood glucose and insulin requirements and adjust regimen as indicated

## 2025-02-11 NOTE — ASSESSMENT & PLAN NOTE
Acute cough starting approximately 1 week prior to presentation, nonproductive  Tachypneic on exam, satting well on room air  Patient meeting sepsis criteria for transient tachycardia (101), tachypnea to 28, and leukocytosis (17.82), though tachycardia rapidly resolved.   Labs notable for leukocytosis and elevated Procal of 2.56  Lactic acid within normal limits  CT chest notable for scattered groundglass opacities and tree-in-bud opacities within both lower lobes and most significant in the right middle lobe, suspicious for atypical infection.  Meeting criteria for severe community-acquired pneumonia on the basis of 3 minor criteria: Multilobar opacities, confusion/disorientation, BUN greater than 20  Drip score 0  Received ceftriaxone and azithromycin in the ED    Plan:  Continue ceftriaxone 1 g every 24 hours  Continue azithromycin 500 mg every 24 hours  Check add-on CRP and trend to monitor response to antibiotics

## 2025-02-11 NOTE — PLAN OF CARE
Problem: PAIN - ADULT  Goal: Verbalizes/displays adequate comfort level or baseline comfort level  Description: Interventions:  - Encourage patient to monitor pain and request assistance  - Assess pain using appropriate pain scale  - Administer analgesics based on type and severity of pain and evaluate response  - Implement non-pharmacological measures as appropriate and evaluate response  - Consider cultural and social influences on pain and pain management  - Notify physician/advanced practitioner if interventions unsuccessful or patient reports new pain  Outcome: Progressing     Problem: INFECTION - ADULT  Goal: Absence or prevention of progression during hospitalization  Description: INTERVENTIONS:  - Assess and monitor for signs and symptoms of infection  - Monitor lab/diagnostic results  - Monitor all insertion sites, i.e. indwelling lines, tubes, and drains  - Monitor endotracheal if appropriate and nasal secretions for changes in amount and color  - [place] appropriate cooling/warming therapies per order  - Administer medications as ordered  - Instruct and encourage patient and family to use good hand hygiene technique  - Identify and instruct in appropriate isolation precautions for identified infection/condition  Outcome: Progressing  Goal: Absence of fever/infection during neutropenic period  Description: INTERVENTIONS:  - Monitor WBC    Outcome: Progressing     Problem: SAFETY ADULT  Goal: Patient will remain free of falls  Description: INTERVENTIONS:  - Educate patient/family on patient safety including physical limitations  - Instruct patient to call for assistance with activity   - Consult OT/PT to assist with strengthening/mobility   - Keep Call bell within reach  - Keep bed low and locked with side rails adjusted as appropriate  - Keep care items and personal belongings within reach  - Initiate and maintain comfort rounds  - Make Fall Risk Sign visible to staff  - Offer Toileting every  Hours,  in advance of need  - Initiate/Maintain alarm  - Obtain necessary fall risk management equipment:   - Apply yellow socks and bracelet for high fall risk patients  - Consider moving patient to room near nurses station  Outcome: Progressing  Goal: Maintain or return to baseline ADL function  Description: INTERVENTIONS:  -  Assess patient's ability to carry out ADLs; assess patient's baseline for ADL function and identify physical deficits which impact ability to perform ADLs (bathing, care of mouth/teeth, toileting, grooming, dressing, etc.)  - Assess/evaluate cause of self-care deficits   - Assess range of motion  - Assess patient's mobility; develop plan if impaired  - Assess patient's need for assistive devices and provide as appropriate  - Encourage maximum independence but intervene and supervise when necessary  - Involve family in performance of ADLs  - Assess for home care needs following discharge   - Consider OT consult to assist with ADL evaluation and planning for discharge  - Provide patient education as appropriate  Outcome: Progressing  Goal: Maintains/Returns to pre admission functional level  Description: INTERVENTIONS:  - Perform AM-PAC 6 Click Basic Mobility/ Daily Activity assessment daily.  - Set and communicate daily mobility goal to care team and patient/family/caregiver.   - Collaborate with rehabilitation services on mobility goals if consulted  - Perform Range of Motion  times a day.  - Reposition patient every  hours.  - Dangle patient  times a day  - Stand patient  times a day  - Ambulate patient  times a day  - Out of bed to chair  times a day   - Out of bed for meals times a day  - Out of bed for toileting  - Record patient progress and toleration of activity level   Outcome: Progressing     Problem: DISCHARGE PLANNING  Goal: Discharge to home or other facility with appropriate resources  Description: INTERVENTIONS:  - Identify barriers to discharge w/patient and caregiver  - Arrange for  needed discharge resources and transportation as appropriate  - Identify discharge learning needs (meds, wound care, etc.)  - Arrange for interpretive services to assist at discharge as needed  - Refer to Case Management Department for coordinating discharge planning if the patient needs post-hospital services based on physician/advanced practitioner order or complex needs related to functional status, cognitive ability, or social support system  Outcome: Progressing     Problem: Knowledge Deficit  Goal: Patient/family/caregiver demonstrates understanding of disease process, treatment plan, medications, and discharge instructions  Description: Complete learning assessment and assess knowledge base.  Interventions:  - Provide teaching at level of understanding  - Provide teaching via preferred learning methods  Outcome: Progressing

## 2025-02-11 NOTE — ASSESSMENT & PLAN NOTE
Mag 1.3 POA  Received magnesium 2 g IV in the ED  Give additional magnesium 2 g IV this afternoon  Repeat magnesium on a.m. labs

## 2025-02-11 NOTE — ASSESSMENT & PLAN NOTE
Meeting criteria for tachycardia (transient, resolved rapidly), tachypnea (now resolved), leukocytosis  Hemodynamically stable  Plan as above under community-acquired pneumonia

## 2025-02-11 NOTE — SEPSIS NOTE
Sepsis Note   Yajaira Marsh 79 y.o. female MRN: [id number]  Unit/Bed#: ED-16 Encounter: [id number]       Initial Sepsis Screening       Row Name 02/11/25 0535 02/11/25 0531             Is the patient's history suggestive of a new or worsening infection? Yes (Proceed)  - Yes (Proceed)  -       Suspected source of infection suspect infection, source unknown  - suspect infection, source unknown  -       Indicate SIRS criteria Leukocytosis (WBC > 17599 IJL) OR Leukopenia (WBC <4000 IJL) OR Bandemia (WBC >10% bands);Tachycardia > 90 bpm  - Tachycardia > 90 bpm;Leukocytosis (WBC > 86721 IJL) OR Leukopenia (WBC <4000 IJL) OR Bandemia (WBC >10% bands)  -       Are two or more of the above signs & symptoms of infection both present and new to the patient? Yes (Proceed)  - Yes (Proceed)  -       Assess for evidence of organ dysfunction: Are any of the below criteria present within 6 hours of suspected infection and SIRS criteria that are NOT considered to be chronic conditions? -- --                 User Key  (r) = Recorded By, (t) = Taken By, (c) = Cosigned By      Initials Name Provider Type     Taylor Lopez PA-C Physician Assistant                        There is no height or weight on file to calculate BMI.  Wt Readings from Last 1 Encounters:   02/10/25 85.2 kg (187 lb 12.8 oz)     IBW (Ideal Body Weight): 47.8 kg    Ideal body weight: 47.8 kg (105 lb 6.1 oz)  Adjusted ideal body weight: 62.8 kg (138 lb 5.6 oz)

## 2025-02-11 NOTE — Clinical Note
Case was discussed with  and the patient's admission status was agreed to be Admission Status: inpatient status to the service of Dr. aBrnett

## 2025-02-11 NOTE — ASSESSMENT & PLAN NOTE
Patient acutely altered starting night prior to presentation  Suspect acute toxic metabolic encephalopathy secondary to acute infectious process  Treat community-acquired pneumonia as above  Avoid sedating medications as able  Monitor mentation

## 2025-02-11 NOTE — ASSESSMENT & PLAN NOTE
Lab Results   Component Value Date    EGFR 31 02/11/2025    EGFR 29 02/11/2025    EGFR 33 (L) 01/05/2025    CREATININE 1.58 (H) 02/11/2025    CREATININE 1.65 (H) 02/11/2025    CREATININE 1.6 (H) 01/05/2025   Baseline creatinine appears to be 1.5-1.7  Avoid nephrotoxins  Monitor renal indices on BMP

## 2025-02-11 NOTE — ED PROVIDER NOTES
Time reflects when diagnosis was documented in both MDM as applicable and the Disposition within this note       Time User Action Codes Description Comment    2/11/2025  8:04 AM Taylor Lopez Add [J18.9] Pneumonia     2/11/2025  8:04 AM [place] Add [A41.9] Sepsis (HCC)     2/11/2025  8:05 AM [place], Taylor Add [R41.82] Altered mental status     2/11/2025  8:05 AM John, Taylor Add [R53.1] Weakness     2/11/2025  8:05 AM John, Taylor Add [R73.9] Hyperglycemia     2/11/2025  8:05 AM [place], Taylor Add [E87.5] Hyperkalemia     2/11/2025  8:05 AM [place], Taylor Add [E83.42] Hypomagnesemia           ED Disposition       ED Disposition   Admit    Condition   Stable    Date/Time   Tue Feb 11, 2025  8:33 AM    Comment   Case was discussed with Dr. Mayfield and the patient's admission status was agreed to be Admission Status: inpatient status to the service of Dr. Mayfield.               Assessment & Plan       Medical Decision Making  Patient seen, examined and noted to have pneumonia, sepsis, altered mental status, weakness, hyperglycemia, hyperkalemia, hypomagnesemia.  Patient presenting to the emergency department via EMS after fall.  Had no injuries of fall but could not stand up on her own.  EMS found blood sugar to be over 400.  Patient is very confused and difficult to obtain any history from when she arrived to the emergency department several head CT was ordered.  CT head and neck were negative for any acute abnormalities.  Patient was then found to have a white blood cell count of 17.82 so sepsis workup was ordered.  After chest x-ray did not reveal any focal pneumonias per my read and procalcitonin was elevated CT scan was ordered and revealed scattered groundglass and tree-in-bud opacities in both lower lobes and the right middle lobe suspicious for atypical infection.  Ordered ceftriaxone and azithromycin for this.  Reached out to the service of internal medicine for further  management and care.    Differential diagnosis includes but is not limited to metabolic abnormality, intracranial etiology, cardiac etiology, hypercarbia, hypoxia, infectious etiology including UTI    Patient's labs notable for: mentioned above    Imaging revealed:   Mentioned above    EKG: was interpreted by myself as above.    Discussed patient's case with Dr. Mayfield ([facility]) regarding admission who accepted the patient for further evaluation and management.    All labs reviewed and utilized in the medical decision making process.    Amount and/or Complexity of Data Reviewed  Labs: ordered. Decision-making details documented in ED Course.  Radiology: ordered and independent interpretation performed. Decision-making details documented in ED Course.    Risk  OTC drugs.  Prescription drug management.  Decision regarding hospitalization.        ED Course as of 02/11/25 0835   Tue Feb 11, 2025   0519 POC Glucose(!!): 429   0530 Patient had a fall today.  Denies head strike however difficult to obtain history from her so we will scan head and neck.  Also unsure how long she was on the floor for so will obtain CK.  Blood sugar of 429 so will rule out DKA as well.   0548 WBC(!): 17.82   0550 Blood gas, venous(!)   0554 MAGNESIUM(!): 1.3   0614 Potassium(!): 5.5   0624 Beta- Hydroxybutyrate: 0.19   0627 Will give 4 units of insulin - pt has never had insulin before so will order q 1 hour fingersticks   0628 Pt got approximatly 200mL of normal saline en route, will hang additional fluids   0649 Procalcitonin(!): 2.56   0711 No focal pneumonias on chest x-ray, will proceed to CT scan of chest and pelvis to further evaluate for cause of patient's elevated white blood cell count   0730 LACTIC ACID: 2.0   0756 CT chest abdomen pelvis wo contrast  Scattered groundglass and tree-in-bud opacities in both lower lobes and right middle lobe, suspicious for an atypical infection.       Medications   magnesium sulfate 2 g/50 mL IVPB  (premix) 2 g (2 g Intravenous New Bag 2/11/25 0804)   azithromycin (ZITHROMAX) 500 mg in sodium chloride 0.9% 250mL IVPB 500 mg (has no administration in time range)   ceftriaxone (ROCEPHIN) 1 g/50 mL in dextrose IVPB (0 mg Intravenous Stopped 2/11/25 0832)   sodium chloride 0.9 % bolus 250 mL (0 mL Intravenous Stopped 2/11/25 0832)   insulin regular (HumuLIN R,NovoLIN R) injection 4 Units (4 Units Subcutaneous Given 2/11/25 0830)       ED Risk Strat Scores   HEART Risk Score      Flowsheet Row Most Recent Value   Heart Score Risk Calculator    History 1 Filed at: 02/11/2025 0835   ECG 0 Filed at: 02/11/2025 0835   Age 2 Filed at: 02/11/2025 0835   Risk Factors 2 Filed at: 02/11/2025 0835   Troponin 0 Filed at: 02/11/2025 0835   HEART Score 5 Filed at: 02/11/2025 0835          HEART Risk Score      Flowsheet Row Most Recent Value   Heart Score Risk Calculator    History 1 Filed at: 02/11/2025 0835   ECG 0 Filed at: 02/11/2025 0835   Age 2 Filed at: 02/11/2025 0835   Risk Factors 2 Filed at: 02/11/2025 0835   Troponin 0 Filed at: 02/11/2025 0835   HEART Score 5 Filed at: 02/11/2025 0835                                                History of Present Illness       Chief Complaint   Patient presents with    Fall     Pt had a fall earlier today, she states she had a hard time getting up at home and her  would not help her get up. Pt states she needs a lot more help around the house and taking care of herself and going to doctor visits and  does not help. She states that wants her daughter to live with her but can't because she lives in a 55 and older community.        Past Medical History:   Diagnosis Date    Acute embolism and thrombosis of unspecified deep veins of unspecified lower extremity (HCC)     Last Assessed:  5/18/17    Anemia     Anosmia     Anxiety     Arthritis     Asthma     Back pain     Bilateral macular retinal edema     CAD (coronary artery disease)     Cataract     Cervical disc  herniation     Cervical radiculopathy     Cervical spinal stenosis     Cervical spondylolysis     Chronic kidney disease     Chronic mastoiditis     Colon polyp     Complex endometrial hyperplasia     Depression     Diabetes mellitus (HCC)     Disease of thyroid gland     DVT (deep venous thrombosis) ([facility])     DVT (deep venous thrombosis) ([facility]) 06/16/2017    Fibromyalgia     Fibromyalgia, primary     Hyperlipidemia     Hypertension     Hypothyroid     Kidney stone     Lumbar radiculopathy     Neck pain     Obese     PONV (postoperative nausea and vomiting)     Shingles 07/01/2021    Spinal stenosis     Stomach ulcer     Thyroid disease     Vascular claudication ([facility]) 12/11/2017      Past Surgical History:   Procedure Laterality Date    BACK SURGERY      CARPAL TUNNEL RELEASE      CATARACT EXTRACTION      CHOLECYSTECTOMY      COLONOSCOPY      CORONARY ANGIOPLASTY      CORONARY ARTERY BYPASS GRAFT      CYSTOSCOPY N/A 6/20/2017    Procedure: CYSTOSCOPY;  Surgeon: Tacho Arnold MD;  Location: [facility];  Service: Gynecology Oncology    CYSTOSCOPY      MO LAPS TOTAL HYSTERECT 250 GM/< W/RMVL TUBE/OVARY N/A 6/20/2017    Procedure: ROBOTIC HYSTERECTOMY; BILATERAL SALPINO-OOPHERECTOMY; umbilical hernia repair.;  Surgeon: Tacho Arnold MD;  Location: [facility];  Service: Gynecology Oncology    MO REPAIR FIRST ABDOMINAL WALL HERNIA N/A 5/12/2022    Procedure: REPAIR HERNIA INCISIONAL;  Surgeon: Horacio Scherer DO;  Location: [facility];  Service: General    TONSILECTOMY AND ADNOIDECTOMY      TONSILLECTOMY      UMBILICAL HERNIA REPAIR        Family History   Problem Relation Age of Onset    Arthritis Mother     Leukemia Mother     Other Mother         Anxiety, major depressive disorder, recurrent episode with atypical features    Coronary artery disease Father         Heart problem    Diabetes Father     Other Father         Infectious disease    Alzheimer's disease Maternal Grandmother     Other Maternal  Grandfather         Heart problem    Other Daughter         Anxiety, major depressive disorder, recurrent episode with atypical features    Alcohol abuse Other         Grandparent    Cancer Family     Diabetes Family     Hypertension Family     Other Family         Reported prior back trouble, thyroid disorder      Social History     Tobacco Use    Smoking status: Former     Current packs/day: 0.00     Average packs/day: 1 pack/day for 6.0 years (6.0 ttl pk-yrs)     Types: Cigarettes     Start date:      Quit date:      Years since quittin.1     Passive exposure: Never    Smokeless tobacco: Never    Tobacco comments:     Smoked about a half a pack for about 4 yrs.  Quite about 50 yrs ago.   Vaping Use    Vaping status: Never Used   Substance Use Topics    Alcohol use: Never    Drug use: No      E-Cigarette/Vaping    E-Cigarette Use Never User       E-Cigarette/Vaping Substances    Nicotine No     THC No     CBD No     Flavoring No     Other No     Unknown No       I have reviewed and agree with the history as documented.     Yajaira Marsh is a 79 y.o. female with a PMH of Type 2 diabetes, CKD presenting to the ED on 2025 with fall. Patient endorses that she fell yesterday.  She is unsure how long she was on the floor for however she notes that her  would not help her stand up.  When she eventually could not get up off of the floor  called EMS who came to evaluate patient.  Patient did bite her tongue and had some bleeding from this from her fall.  She denies any head strike or any other injuries or complaints however EMS checked her blood sugar and found it to be over 400 so brought her in for evaluation.  Patient does note that she was throwing up yesterday.   who eventually joins patient at bedside states that she was very altered and weak yesterday.  Patient denies chest pain, shortness of breath, abdominal pain, dysuria, fevers, chills, cough, congestion or any  other complaints at this time.             Review of Systems   Constitutional:  Negative for chills and fever.   HENT:  Negative for congestion.    Respiratory:  Negative for cough and shortness of breath.    Cardiovascular:  Negative for chest pain and palpitations.   Gastrointestinal:  Positive for vomiting. Negative for abdominal pain.   Genitourinary:  Negative for dysuria.   Musculoskeletal:  Negative for arthralgias.   Skin:  Positive for wound.        On tongue   Neurological:  Negative for dizziness, syncope, weakness and headaches.           Objective       ED Triage Vitals   Temperature Pulse Blood Pressure Respirations SpO2 Patient Position - Orthostatic VS   02/11/25 0519 02/11/25 0506 02/11/25 0506 02/11/25 0506 02/11/25 0506 02/11/25 0506   98.1 °F (36.7 °C) 101 126/56 20 94 % Lying      Temp Source Heart Rate Source BP Location FiO2 (%) Pain Score    02/11/25 0519 02/11/25 0506 02/11/25 0506 -- --    Oral Monitor Right arm        Vitals      Date and Time Temp Pulse SpO2 Resp BP Pain Score FACES Pain Rating User   02/11/25 0519 98.1 °F (36.7 °C) -- -- -- -- -- -- JH   02/11/25 0506 -- 101 94 % 20 126/56 -- -- JR            Physical Exam  Constitutional:       General: She is not in acute distress.     Appearance: She is not ill-appearing or toxic-appearing.   HENT:      Head: Normocephalic. No raccoon eyes or Maciel's sign.      Comments: Small lac on tongue     Right Ear: Tympanic membrane, ear canal and external ear normal. There is no impacted cerumen. No hemotympanum.      Left Ear: Tympanic membrane, ear canal and external ear normal. There is no impacted cerumen. No hemotympanum.      Nose: Nose normal.      Mouth/Throat:      Pharynx: No oropharyngeal exudate or posterior oropharyngeal erythema.   Eyes:      Extraocular Movements: Extraocular movements intact.      Pupils: Pupils are equal, round, and reactive to light.   Cardiovascular:      Rate and Rhythm: Regular rhythm. Tachycardia  present.      Pulses: Normal pulses.      Heart sounds: Normal heart sounds. No murmur heard.     No friction rub. No gallop.   Pulmonary:      Effort: Pulmonary effort is normal. No respiratory distress.      Breath sounds: Normal breath sounds. No stridor. No wheezing, rhonchi or rales.   Chest:      Chest wall: No tenderness.   Abdominal:      General: Abdomen is flat. Bowel sounds are normal. There is no distension.      Palpations: Abdomen is soft.      Tenderness: There is no abdominal tenderness. There is no right CVA tenderness, left CVA tenderness, guarding or rebound.   Musculoskeletal:         General: Normal range of motion.      Cervical back: Normal range of motion. No rigidity or tenderness.   Lymphadenopathy:      Cervical: No cervical adenopathy.   Skin:     General: Skin is warm and dry.      Capillary Refill: Capillary refill takes less than 2 seconds.   Neurological:      General: No focal deficit present.      Mental Status: She is alert and oriented to person, place, and time.      GCS: GCS eye subscore is 4. GCS verbal subscore is 5. GCS motor subscore is 6.      Cranial Nerves: Cranial nerves 2-12 are intact. No cranial nerve deficit, dysarthria or facial asymmetry.      Sensory: Sensation is intact. No sensory deficit.      Motor: Motor function is intact.         Results Reviewed       Procedure Component Value Units Date/Time    Fingerstick Glucose (POCT) [[id number]]  (Abnormal) Collected: 02/11/25 0819    Lab Status: Final result Specimen: Blood Updated: 02/11/25 0820     POC Glucose 406 mg/dl     Urine Microscopic [[id number]]  (Normal) Collected: 02/11/25 0800    Lab Status: Final result Specimen: Urine, Straight Cath Updated: 02/11/25 0818     RBC, UA None Seen /hpf      WBC, UA None Seen /hpf      Epithelial Cells Occasional /hpf      Bacteria, UA None Seen /hpf     UA w Reflex to Microscopic w Reflex to Culture [[id number]]  (Abnormal) Collected: 02/11/25 0800    Lab Status: Final  result Specimen: Urine, Straight Cath Updated: 02/11/25 0817     Color, UA Light Yellow     Clarity, UA Clear     Specific Gravity, UA 1.023     pH, UA 5.0     Leukocytes, UA Elevated glucose may cause decreased leukocyte values. See urine microscopic [place] [facility]     Nitrite, UA Negative     Protein, UA Trace mg/dl      Glucose, UA >=1000 (1%) mg/dl      Ketones, UA Negative mg/dl      Urobilinogen, UA <2.0 mg/dl      Bilirubin, UA Negative     Occult Blood, UA Negative    Protime-INR [[id number]]  (Abnormal) Collected: 02/11/25 0656    Lab Status: Final result Specimen: Blood from Arm, Left Updated: 02/11/25 0815     Protime 15.2 seconds      INR 1.12    Narrative:      INR Therapeutic Range    Indication                                             INR Range      Atrial Fibrillation                                               2.0-3.0  Hypercoagulable State                                    2.0.2.3  Left Ventricular Asist Device                            2.0-3.0  Mechanical Heart Valve                                  -    Aortic(with afib, MI, embolism, HF, LA enlargement,    and/or coagulopathy)                                     2.0-3.0 (2.5-3.5)     Mitral                                                             2.5-3.5  Prosthetic/Bioprosthetic Heart Valve               2.0-3.0  Venous thromboembolism (VTE: VT, PE        2.0-3.0    APTT [[id number]]  (Normal) Collected: 02/11/25 0656    Lab Status: Final result Specimen: Blood from Arm, Left Updated: 02/11/25 0815     PTT 27 seconds     FLU/COVID Rapid Antigen (30 min. TAT) - Preferred screening test in ED [[id number]]     Lab Status: No result Specimen: Nares from Nose     RBC Morphology Reflex Test [[id number]] Collected: 02/11/25 0531    Lab Status: Final result Specimen: Blood from Arm, Right Updated: 02/11/25 0801    HS Troponin I 2hr [[id number]]  (Normal) Collected: 02/11/25 0721    Lab Status: Final result Specimen: Blood from Arm, Right Updated:  02/11/25 0759     hs TnI 2hr 9 ng/L      Delta 2hr hsTnI 1 ng/L     CBC and differential [[id number]]  (Abnormal) Collected: 02/11/25 0531    Lab Status: Final result Specimen: Blood from Arm, Right Updated: 02/11/25 0731     WBC 17.82 Thousand/uL      RBC 3.75 Million/uL      Hemoglobin 10.5 g/dL      Hematocrit 32.2 %      MCV 86 fL      MCH 28.0 pg      MCHC 32.6 g/dL      RDW 14.4 %      MPV 12.1 fL      Platelets 197 Thousands/uL     Narrative:      This is an appended report.  These results have been appended to a previously verified report.    Manual Differential(PHLEBS Do Not Order) [[id number]]  (Abnormal) Collected: 02/11/25 0531    Lab Status: Final result Specimen: Blood from Arm, Right Updated: 02/11/25 0731     Segmented % 82 %      Bands % 7 %      Lymphocytes % 9 %      Monocytes % 2 %      Eosinophils % 0 %      Basophils % 0 %      Absolute Neutrophils 15.86 Thousand/uL      Absolute Lymphocytes 1.60 Thousand/uL      Absolute Monocytes 0.36 Thousand/uL      Absolute Eosinophils 0.00 Thousand/uL      Absolute Basophils 0.00 Thousand/uL      Total Counted --     RBC Morphology Present     Platelet Estimate Adequate     Large Platelet Present     Hypochromia Present     Microcytes Present     Poikilocytes Present    Lactic acid [[id number]]  (Normal) Collected: 02/11/25 0656    Lab Status: Final result Specimen: Blood from Arm, Left Updated: 02/11/25 0727     LACTIC ACID 2.0 mmol/L     Narrative:      Result may be elevated if tourniquet was used during collection.    Blood culture #1 [[id number]] Collected: 02/11/25 0656    Lab Status: In process Specimen: Blood from Arm, Left Updated: 02/11/25 0723    Blood culture #2 [[id number]] Updated: 02/11/25 0706    Lab Status: In process Specimen: Blood     Procalcitonin [[id number]]  (Abnormal) Collected: 02/11/25 0531    Lab Status: Final result Specimen: Blood from Arm, Right Updated: 02/11/25 0638     Procalcitonin 2.56 ng/ml     HS Troponin 0hr (reflex  protocol) [[id number]]  (Normal) Collected: 02/11/25 0531    Lab Status: Final result Specimen: Blood from Arm, Right Updated: 02/11/25 0600     hs TnI 0hr 8 ng/L     Comprehensive metabolic panel [[id number]]  (Abnormal) Collected: 02/11/25 0531    Lab Status: Final result Specimen: Blood from Arm, Right Updated: 02/11/25 0557     Sodium 134 mmol/L      Potassium 5.5 mmol/L      Chloride 103 mmol/L      CO2 21 mmol/L      ANION GAP 10 mmol/L      BUN 31 mg/dL      Creatinine 1.65 mg/dL      Glucose 469 mg/dL      Calcium 9.2 mg/dL      AST 18 U/L      ALT 18 U/L      Alkaline Phosphatase 102 U/L      Total Protein 7.5 g/dL      Albumin 3.8 g/dL      Total Bilirubin 0.63 mg/dL      eGFR 29 ml/min/1.73sq m     Narrative:      National Kid[employer] Foundation guidelines for Chronic Kidney Disease (CKD):     Stage 1 with normal or high GFR (GFR > 90 mL/min/1.73 square meters)    Stage 2 Mild CKD (GFR = 60-89 mL/min/1.73 square meters)    Stage 3A Moderate CKD (GFR = 45-59 mL/min/1.73 square meters)    Stage 3B Moderate CKD (GFR = 30-44 mL/min/1.73 square meters)    Stage 4 Severe CKD (GFR = 15-29 mL/min/1.73 square meters)    Stage 5 End Stage CKD (GFR <15 mL/min/1.73 square meters)  Note: GFR calculation is accurate only with a steady state creatinine    Beta Hydroxybutyrate [[id number]]  (Normal) Collected: 02/11/25 0531    Lab Status: Final result Specimen: Blood from Arm, Right Updated: 02/11/25 0554     Beta- Hydroxybutyrate 0.19 mmol/L     CK [[id number]]  (Normal) Collected: 02/11/25 0531    Lab Status: Final result Specimen: Blood from Arm, Right Updated: 02/11/25 0554     Total  U/L     Magnesium [[id number]]  (Abnormal) Collected: 02/11/25 0531    Lab Status: Final result Specimen: Blood from Arm, Right Updated: 02/11/25 0554     Magnesium 1.3 mg/dL     Blood gas, venous [[id number]]  (Abnormal) Collected: 02/11/25 0531    Lab Status: Final result Specimen: Blood from Arm, Right Updated: 02/11/25 0563      pH, Dae 7.349     pCO2, Dae 35.0 mm Hg      pO2, Dae 64.8 mm Hg      HCO3, Dae 18.8 mmol/L      Base Excess, Dae -6.0 mmol/L      O2 Content, Dae 14.4 ml/dL      O2 HGB, VENOUS 88.8 %     Fingerstick Glucose (POCT) [[id number]]  (Abnormal) Collected: 02/11/25 0513    Lab Status: Final result Specimen: Blood Updated: 02/11/25 0514     POC Glucose 429 mg/dl             CT chest abdomen pelvis wo contrast   Final Interpretation by Supa Mercado MD (02/11 0755)      1.  Scattered groundglass and tree-in-bud opacities in both lower lobes and right middle lobe, suspicious for an atypical infection.   2.  No acute findings in the abdomen or pelvis.      The study was marked in EPIC for immediate notification.         Workstation performed: [id number]         XR chest 1 view portable   ED Interpretation by Taylor Lopez PA-C (02/11 0707)   No acute cardiopulmonary abnormalities per my read      CT head wo contrast   Final Interpretation by Jabari Hernandez MD (02/11 0624)      No acute intracranial process.      No skull fracture.      Chronic microangiopathy.                  Workstation performed: [id number]         CT spine cervical without contrast   Final Interpretation by Jabari Hernandez MD (02/11 0630)      No cervical spine fracture or traumatic malalignment.                  Workstation performed: [id number]             ECG 12 Lead Documentation Only    Date/Time: 2/11/2025 5:25 AM    Performed by: Taylor Lopez PA-C  Authorized by: Taylor Lopez PA-C    Indications / Diagnosis:  Tachycardia  ECG reviewed by me, the ED Provider: yes    Patient location:  ED  Previous ECG:     Previous ECG:  Compared to current    Comparison ECG info:  9/2/23    Similarity:  No change    Comparison to cardiac monitor: Yes    Interpretation:     Interpretation: normal    Rate:     ECG rate:  97    ECG rate assessment: normal    Rhythm:     Rhythm: sinus rhythm    Ectopy:     Ectopy: none    QRS:     " QRS axis:  Normal    QRS intervals:  Normal  Conduction:     Conduction: normal    ST segments:     ST segments:  Normal  T waves:     T waves: normal        ED Medication and Procedure Management   Prior to Admission Medications   Prescriptions Last Dose Informant Patient Reported? Taking?   ALPRAZolam (XANAX) 0.5 mg tablet   No No   Sig: Take 1 tablet (0.5 mg total) by mouth 2 (two) times a day as needed for anxiety   B-D UF III MINI PEN NEEDLES 31G X 5 MM MISC  Self Yes No   BD Insulin Syringe U/F 31G X 5/16\" 0.5 ML MISC  Self Yes No   Si (four) times a day As directed   Coenzyme Q10 (Co Q-10) 200 MG CAPS  Self Yes No   Sig: Take by mouth in the morning   Continuous Blood Gluc Sensor (Dexcom G6 Sensor) MISC  Self No No   Sig: Use 1 each daily at bedtime as needed (scalp itching)   Patient not taking: Reported on 2/10/2025   Continuous Glucose Sensor (Dexcom G7 Sensor)   No No   Sig: Use 1 Device every 10 days   Patient not taking: Reported on 2/10/2025   Toujeo SoloStar 300 units/mL CONCENTRATED U-300 injection pen (1-unit dial)  Self No No   Sig: Inject 62 Units under the skin in the morning   bisacodyl (DULCOLAX) 5 mg EC tablet   No No   Sig: Take 2 tablets (10 mg total) by mouth once for 1 dose   cholecalciferol (VITAMIN D3) 1,000 units tablet  Self Yes No   Sig: Take 2,000 Units by mouth daily   diltiazem (TIAZAC) 300 MG 24 hr capsule  Self No No   Sig: Take 1 capsule (300 mg total) by mouth daily Tiddylt   fluticasone (FLONASE) 50 mcg/act nasal spray   No No   Si sprays into each nostril daily   insulin aspart (NovoLOG FlexPen) 100 UNIT/ML injection pen  Self Yes No   Sig: Inject 18 Units under the skin 2 (two) times a day with meals   irbesartan (AVAPRO) 300 mg tablet   Yes No   Sig: take 1 tablet by mouth every day at night   isosorbide mononitrate (IMDUR) 60 mg 24 hr tablet  Self Yes No   levothyroxine 75 mcg tablet  Self Yes No   Sig: Take 75 mcg by mouth daily   polyethylene glycol (GOLYTELY) " 4000 mL solution   No No   Sig: Take 4,000 mL by mouth once for 1 dose   rosuvastatin (CRESTOR) 20 MG tablet  Self No No   Sig: Take 1 tablet (20 mg total) by mouth every evening   torsemide (DEMADEX) 20 mg tablet  Self No No   Sig: TAKE 0.5 TABLETS (10 MG TOTAL) BY MOUTH AS NEEDED (TAKE UP TO 2 TIMES PER WEEK FOR LEG SWELLING AS NEEDED)   venlafaxine (EFFEXOR) 75 mg tablet   No No   Sig: Take 1 tablet (75 mg total) by mouth in the morning      Facility-Administered Medications: None     Patient's Medications   Discharge Prescriptions    No medications on file     No discharge procedures on file.  ED SEPSIS DOCUMENTATION   Time reflects when diagnosis was documented in both MDM as applicable and the Disposition within this note       Time User Action Codes Description Comment    2/11/2025  8:04 AM Katya Lopezissa Add [J18.9] Pneumonia     2/11/2025  8:04 AM Katya Lopezissa Add [A41.9] Sepsis (HCC)     2/11/2025  8:05 AM Katya Lopezissa Add [R41.82] Altered mental status     2/11/2025  8:05 AM JohnKatya borgesissa Add [R53.1] Weakness     2/11/2025  8:05 AM [place]ya borges[place] Add [R73.9] Hyperglycemia     2/11/2025  8:05 AM Katya Lopezissa Add [E87.5] Hyperkalemia     2/11/2025  8:05 AM Katya Lopezissa Add [E83.42] Hypomagnesemia            Initial Sepsis Screening       Row Name 02/11/25 0535 02/11/25 0531             Is the patient's history suggestive of a new or worsening infection? Yes (Proceed)  - Yes (Proceed)  -       Suspected source of infection suspect infection, source unknown  - suspect infection, source unknown  -       Indicate SIRS criteria Leukocytosis (WBC > 92346 IJL) OR Leukopenia (WBC <4000 IJL) OR Bandemia (WBC >10% bands);Tachycardia > 90 bpm  - Tachycardia > 90 bpm;Leukocytosis (WBC > 39883 IJL) OR Leukopenia (WBC <4000 IJL) OR Bandemia (WBC >10% bands)  -       Are two or more of the above signs & symptoms of infection both present and new to the patient? Yes (Proceed)  - Yes  (Proceed)  -       Assess for evidence of organ dysfunction: Are any of the below criteria present within 6 hours of suspected infection and SIRS criteria that are NOT considered to be chronic conditions? -- --                 User Key  (r) = Recorded By, (t) = Taken By, (c) = Cosigned By      Initials Name Provider Type     Taylor Lopez PA-C Physician Assistant                       Taylor Lopez PA-C  02/11/25 0885

## 2025-02-11 NOTE — RESPIRATORY THERAPY NOTE
RT Protocol Note  Yajaira Marsh 79 y.o. female MRN: [id number]  Unit/Bed#: ED-16 Encounter: [id number]    Assessment    Active Problems:  There are no active Hospital Problems.      Home Pulmonary Medications:  None        Past Medical History:   Diagnosis Date    Acute embolism and thrombosis of unspecified deep veins of unspecified lower extremity (HCC)     Last Assessed:  17    Anemia     Anosmia     Anxiety     Arthritis     Asthma     Back pain     Bilateral macular retinal edema     CAD (coronary artery disease)     Cataract     Cervical disc herniation     Cervical radiculopathy     Cervical spinal stenosis     Cervical spondylolysis     Chronic kidney disease     Chronic mastoiditis     Colon polyp     Complex endometrial hyperplasia     Depression     Diabetes mellitus (HCC)     Disease of thyroid gland     DVT (deep venous thrombosis) ([facility])     DVT (deep venous thrombosis) ([facility]) 2017    Fibromyalgia     Fibromyalgia, primary     Hyperlipidemia     Hypertension     Hypothyroid     Kidney stone     Lumbar radiculopathy     Neck pain     Obese     PONV (postoperative nausea and vomiting)     Shingles 2021    Spinal stenosis     Stomach ulcer     Thyroid disease     Vascular claudication ([facility]) 2017     Social History     Socioeconomic History    Marital status: /Civil Union     Spouse name: None    Number of children: 2    Years of education: High school or GED    Highest education level: None   Occupational History    Occupation: Retired   Tobacco Use    Smoking status: Former     Current packs/day: 0.00     Average packs/day: 1 pack/day for 6.0 years (6.0 ttl pk-yrs)     Types: Cigarettes     Start date:      Quit date:      Years since quittin.1     Passive exposure: Never    Smokeless tobacco: Never    Tobacco comments:     Smoked about a half a pack for about 4 yrs.  Quite about 50 yrs ago.   Vaping Use    Vaping status: Never Used   Substance and Sexual  Activity    Alcohol use: Never    Drug use: No    Sexual activity: Not Currently     Comment: No known STD risk factors   Other Topics Concern    None   Social History Narrative    Lives independently with spouse    No known risk factors    Denied:  Exercising regularly     Social Drivers of Health     Financial Resource Strain: Low Risk  (2024)    Overall Financial Resource Strain (CARDIA)     Difficulty of Paying Living Expenses: Not hard at all   Food Insecurity: No Food Insecurity (2024)    Nursing - Inadequate Food Risk Classification     Worried About Running Out of Food in the Last Year: Not on file     Ran Out of Food in the Last Year: Not on file     Ran Out of Food in the Last Year: Never true   Transportation Needs: No Transportation Needs (2024)    Nursing - Transportation Risk Classification     Lack of Transportation: Not on file     Lack of Transportation: No   Physical Activity: Not on file   Stress: Not on file   Social Connections: Not on file   Intimate Partner Violence: Unknown (2024)    Nursing IPS     Feels Physically and Emotionally Safe: Not on file     Physically Hurt by Someone: Not on file     Humiliated or Emotionally Abused by Someone: Not on file     Physically Hurt by Someone: No     Hurt or Threatened by Someone: No   Housing Stability: Unknown (2024)    Nursing: Inadequate Housing Risk Classification     Has Housing: Not on file     Worried About Losing Housing: Not on file     Unable to Get Utilities: Not on file     Unable to Pay for Housing in the Last Year: No     Has Housin       Subjective         Objective    Physical Exam:   Assessment Type: (P) Assess only  General Appearance: (P) Awake, Alert  Chest Assessment: (P) Chest expansion symmetrical  Bilateral Breath Sounds: (P) Diminished, Clear    Vitals:  Blood pressure 152/66, pulse 85, temperature 98.1 °F (36.7 °C), temperature source Oral, resp. rate 20, SpO2 94%.          Imaging and other  studies: Results Review Statement: No pertinent imaging studies reviewed.      Plan    Respiratory Plan: (P) No distress/Pulmonary history        Resp Comments: (P) pt evaluated per protocol. pt has hx of asthma pt stated that she does not take meds at home. no tx indicated at this time. protocol d/c'd.

## 2025-02-11 NOTE — H&P
H&P - Hospitalist   Name: Yajaira Marsh 79 y.o. female I MRN: [id number]  Unit/Bed#: -01 I Date of Admission: 2/11/2025   Date of Service: 2/11/2025 I Hospital Day: 0     Assessment & Plan  Community acquired pneumonia  Acute cough starting approximately 1 week prior to presentation, nonproductive  Tachypneic on exam, satting well on room air  Patient meeting sepsis criteria for transient tachycardia (101), tachypnea to 28, and leukocytosis (17.82), though tachycardia rapidly resolved.   Labs notable for leukocytosis and elevated Procal of 2.56  Lactic acid within normal limits  CT chest notable for scattered groundglass opacities and tree-in-bud opacities within both lower lobes and most significant in the right middle lobe, suspicious for atypical infection.  Meeting criteria for severe community-acquired pneumonia on the basis of 3 minor criteria: Multilobar opacities, confusion/disorientation, BUN greater than 20  Drip score 0  Received ceftriaxone and azithromycin in the ED    Plan:  Continue ceftriaxone 1 g every 24 hours  Continue azithromycin 500 mg every 24 hours  Check add-on CRP and trend to monitor response to antibiotics  Toxic metabolic encephalopathy  Patient acutely altered starting night prior to presentation  Suspect acute toxic metabolic encephalopathy secondary to acute infectious process  Treat community-acquired pneumonia as above  Avoid sedating medications as able  Monitor mentation  Type 2 diabetes mellitus with complication, with long-term current use of insulin ([facility])  Lab Results   Component Value Date    HGBA1C 12.4 (A) 02/10/2025     Recent Labs     02/11/25  0513 02/11/25  0819 02/11/25  0942 02/11/25  1257   POCGLU 429* 406* 371* 277*   Blood Sugar Average: Last 72 hrs:  (P) 370.75    -Recent A1c of 12.4 indicates poorly controlled type 2 diabetes  -Patient's  reports sporadic compliance with home insulin regimen  -Home regimen insulin aspart 18 units twice daily with  meals and Toujeo 62 units QAM  -Due to noncompliance, unclear what patient's true insulin requirements are, as home regimen is significantly more than would typically be required for somebody of patient's weight  -Suspect hyperglycemia at presentation is multifactorial due to poorly controlled type 2 diabetes mellitus and acute infection    Plan:  Start Lantus 20 units nightly  Start lispro 7 units 3 times daily with meals  Start sliding scale insulin  Avoid hypoglycemia per protocol  Monitor blood glucose and insulin requirements and adjust regimen as indicated  Fall  Unclear mechanism, but occurred overnight getting up from bed in setting of acute infectious process  Patient unsure whether she has had any recent medication changes, longstanding prescription for Xanax as needed  Per patient's spouse, patient walks without assistive device at baseline    Plan:  Check orthostatic vital signs  PT/OT eval and treat  Hold home as-needed Xanax while acutely altered  Can discuss trial weaning of benzodiazepines outpatient when patient mentating appropriately    Hypertension  Continue home regimen diltiazem and irbesartan with hold parameters  Hypothyroidism  Continue home levothyroxine  Check TSH with T4 reflex, given altered mental status  Stage 3b chronic kidney disease (HCC)  Lab Results   Component Value Date    EGFR 31 02/11/2025    EGFR 29 02/11/2025    EGFR 33 (L) 01/05/2025    CREATININE 1.58 (H) 02/11/2025    CREATININE 1.65 (H) 02/11/2025    CREATININE 1.6 (H) 01/05/2025   Baseline creatinine appears to be 1.5-1.7  Avoid nephrotoxins  Monitor renal indices on BMP  Hypomagnesemia  Mag 1.3 POA  Received magnesium 2 g IV in the ED  Give additional magnesium 2 g IV this afternoon  Repeat magnesium on a.m. labs  Sepsis (HCC) (Resolved: 2/11/2025)  Meeting criteria for tachycardia (transient, resolved rapidly), tachypnea (now resolved), leukocytosis  Hemodynamically stable  Plan as above under community-acquired  "pneumonia  Hyperkalemia (Resolved: 2/11/2025)  5.5 POA, in the setting of hyperglycemia  Resolved on repeat labs following regular insulin 4 units and normal saline 250 mL    VTE Pharmacologic Prophylaxis: VTE Score: 8 High Risk (Score >/= 5) - Pharmacological DVT Prophylaxis Ordered: heparin. Sequential Compression Devices Ordered.  Code Status: Level 1 - Full Code   Discussion with family: Updated  () via phone.    Anticipated Length of Stay: Patient will be admitted on an observation basis with an anticipated length of stay of less than 2 midnights secondary to community acquired pneumonia.    History of Present Illness   Chief Complaint: Fall    Yajaira Marsh is a 79 y.o. female with a PMH of CAD status post CABG and PCI, type 2 diabetes complicated by polyneuropathy on insulin, PAD, DVT, asthma, hypertension, hyperlipidemia, hypothyroidism, obesity, fibromyalgia, who presents from home via EMS for fall overnight.   History is provided by patient and her  due to acute confusion limiting patient is a reliable historian.  Patient recalls going to bed last night and falling when ambulating from bed overnight, but cannot recall any other specifics around the event, including mechanism of fall. At this time, she reports a cough starting \"a couple days ago,\" which she reports is non-productive.  Denies dizziness, lightheadedness, headaches, subjective fevers, chills, sweats, shortness of breath, chest pain, diarrhea, arthralgias, myalgias.  Does endorse nasal congestion, rhinorrhea, abdominal pain, nausea, vomiting.   Patient reports managing her medications independently at home, but is currently having trouble remembering what medications she takes or if there have been any recent medication changes.  She does report that she occasionally misses doses of her home medications, but is not sure which medications or how often do at this time.   corroborates that patient's baseline " is functional and independent at baseline and that she independently manages her medications at home, and so he is not familiar with her current medication regimen.  He does report that she is sporadically compliant with her home insulin regimen but otherwise compliant with home meds to the best of his knowledge.   Per patient's , he has been concerned for patient's acute cough, which she reports she developed approximately 1 week ago.  He corroborates that the cough has been nonproductive that she has not complained of any shortness of breath or difficulty breathing nor been in apparent respiratory distress. He reports that she has otherwise been in her usual state of health until after dinner last night, when she had an episode of emesis and became confused/disoriented.  Patient's  reports that he put her to bed and that she fell asleep without issue while he went to sleep on the sofa in the other room.  Reports that he was then awakened overnight by patient calling for him, and that he found her on the ground adjacent to the bed.  Mechanism of fall was not evident.  Patient's spouse then called EMS, as patient was too weak to recover from fall and patient's spouse is too weak to assist her.    In the ED, patient noted to be hyperglycemic to 400s, for which she received 4 units of regular insulin, hypomagnesemic, for which she received 2 g of magnesium IV, and mildly hyperkalemic, for which she received the forementioned insulin and a 250 cc normal saline bolus. CT of the chest demonstrated multilobar GGO's and tree-in-bud opacities, and so patient was started on ceftriaxone and azithromycin.    Review of Systems   Constitutional:  Negative for activity change, appetite change, chills, diaphoresis and fever.   HENT:  Positive for congestion and rhinorrhea. Negative for sore throat.    Respiratory:  Positive for cough (for approximately 1 week, non-productive). Negative for chest tightness, shortness  of breath and wheezing.    Cardiovascular:  Negative for chest pain.   Gastrointestinal:  Positive for abdominal pain, nausea (yesterday, none at present) and vomiting (yesterday, none at present). Negative for constipation and diarrhea.   Genitourinary:  Negative for difficulty urinating and dysuria.   Musculoskeletal:  Negative for arthralgias and myalgias.   Neurological:  Negative for dizziness, weakness and light-headedness.   Psychiatric/Behavioral:  Positive for confusion (acute, endorsed by both pt and her spouse).        Historical Information   Past Medical History:   Diagnosis Date    Acute embolism and thrombosis of unspecified deep veins of unspecified lower extremity (HCC)     Last Assessed:  5/18/17    Anemia     Anosmia     Anxiety     Arthritis     Asthma     Back pain     Bilateral macular retinal edema     CAD (coronary artery disease)     Cataract     Cervical disc herniation     Cervical radiculopathy     Cervical spinal stenosis     Cervical spondylolysis     Chronic kidney disease     Chronic mastoiditis     Colon polyp     Complex endometrial hyperplasia     Depression     Diabetes mellitus (HCC)     Disease of thyroid gland     DVT (deep venous thrombosis) ([facility])     DVT (deep venous thrombosis) ([facility]) 06/16/2017    Fibromyalgia     Fibromyalgia, primary     Hyperlipidemia     Hypertension     Hypothyroid     Kidney stone     Lumbar radiculopathy     Neck pain     Obese     PONV (postoperative nausea and vomiting)     Shingles 07/01/2021    Spinal stenosis     Stomach ulcer     Thyroid disease     Vascular claudication ([facility]) 12/11/2017     Past Surgical History:   Procedure Laterality Date    BACK SURGERY      CARPAL TUNNEL RELEASE      CATARACT EXTRACTION      CHOLECYSTECTOMY      COLONOSCOPY      CORONARY ANGIOPLASTY      CORONARY ARTERY BYPASS GRAFT      CYSTOSCOPY N/A 6/20/2017    Procedure: CYSTOSCOPY;  Surgeon: Tacho Arnold MD;  Location: [facility];  Service: Gynecology Oncology     CYSTOSCOPY      NY LAPS TOTAL HYSTERECT 250 GM/< W/RMVL TUBE/OVARY N/A 2017    Procedure: ROBOTIC HYSTERECTOMY; BILATERAL SALPINO-OOPHERECTOMY; umbilical hernia repair.;  Surgeon: Tacho Arnold MD;  Location: [facility];  Service: Gynecology Oncology    NY REPAIR FIRST ABDOMINAL WALL HERNIA N/A 2022    Procedure: REPAIR HERNIA INCISIONAL;  Surgeon: Horacio Scherer DO;  Location: [facility];  Service: General    TONSILECTOMY AND ADNOIDECTOMY      TONSILLECTOMY      UMBILICAL HERNIA REPAIR       Social History     Tobacco Use    Smoking status: Former     Current packs/day: 0.00     Average packs/day: 1 pack/day for 6.0 years (6.0 ttl pk-yrs)     Types: Cigarettes     Start date:      Quit date:      Years since quittin.1     Passive exposure: Never    Smokeless tobacco: Never    Tobacco comments:     Smoked about a half a pack for about 4 yrs.  Quite about 50 yrs ago.   Vaping Use    Vaping status: Never Used   Substance and Sexual Activity    Alcohol use: Never    Drug use: No    Sexual activity: Not Currently     Comment: No known STD risk factors     E-Cigarette/Vaping    E-Cigarette Use Never User      E-Cigarette/Vaping Substances    Nicotine No     THC No     CBD No     Flavoring No     Other No     Unknown No      Family History   Problem Relation Age of Onset    Arthritis Mother     Leukemia Mother     Other Mother         Anxiety, major depressive disorder, recurrent episode with atypical features    Coronary artery disease Father         Heart problem    Diabetes Father     Other Father         Infectious disease    Alzheimer's disease Maternal Grandmother     Other Maternal Grandfather         Heart problem    Other Daughter         Anxiety, major depressive disorder, recurrent episode with atypical features    Alcohol abuse Other         Grandparent    Cancer Family     Diabetes Family     Hypertension Family     Other Family         Reported prior back trouble, thyroid  "disorder     Social History:  Marital Status: /Civil Union   Occupation: Not assessed  Patient Pre-hospital Living Situation: Home, With spouse  Patient Pre-hospital Level of Mobility: walks  Patient Pre-hospital Diet Restrictions: Diabetic    Meds/Allergies   I have reviewed home medications using recent Epic encounter.  Prior to Admission medications    Medication Sig Start Date End Date Taking? Authorizing Provider   ALPRAZolam (XANAX) 0.5 mg tablet Take 1 tablet (0.5 mg total) by mouth 2 (two) times a day as needed for anxiety 1/15/25   Sukumar Clemens DO   B-D UF III MINI PEN NEEDLES 31G X 5 MM MISC  6/5/20   Historical Provider, MD   BD Insulin Syringe U/F 31G X 5/16\" 0.5 ML MISC 4 (four) times a day As directed 8/22/22   Historical Provider, MD   bisacodyl (DULCOLAX) 5 mg EC tablet Take 2 tablets (10 mg total) by mouth once for 1 dose 12/11/24 12/11/24  Michelle Carrasco MD   cholecalciferol (VITAMIN D3) 1,000 units tablet Take 2,000 Units by mouth daily    Historical Provider, MD   Coenzyme Q10 (Co Q-10) 200 MG CAPS Take by mouth in the morning    Historical Provider, MD   Continuous Blood Gluc Sensor (Dexcom G6 Sensor) MISC Use 1 each daily at bedtime as needed (scalp itching)  Patient not taking: Reported on 2/10/2025 4/23/24   Sukumar Clemens DO   Continuous Glucose Sensor (Dexcom G7 Sensor) Use 1 Device every 10 days  Patient not taking: Reported on 2/10/2025 1/31/25   Sukumar Clemens DO   diltiazem (TIAZAC) 300 MG 24 hr capsule Take 1 capsule (300 mg total) by mouth daily Tiddylt 8/19/24   Sukumar Clemens DO   fluticasone (FLONASE) 50 mcg/act nasal spray 2 sprays into each nostril daily 2/10/25   Sukumar Clemens DO   insulin aspart (NovoLOG FlexPen) 100 UNIT/ML injection pen Inject 18 Units under the skin 2 (two) times a day with meals    Historical Provider, MD   irbesartan (AVAPRO) 300 mg tablet take 1 tablet by mouth every day at night 11/22/24   Historical Provider, MD "   isosorbide mononitrate (IMDUR) 60 mg 24 hr tablet  8/23/21   Historical Provider, MD   levothyroxine 75 mcg tablet Take 75 mcg by mouth daily 11/7/23   Historical Provider, MD   polyethylene glycol (GOLYTELY) 4000 mL solution Take 4,000 mL by mouth once for 1 dose 12/11/24 12/11/24  Michelle Carrasco MD   rosuvastatin (CRESTOR) 20 MG tablet Take 1 tablet (20 mg total) by mouth every evening 8/19/24   Sukumar Clemens DO   torsemide (DEMADEX) 20 mg tablet TAKE 0.5 TABLETS (10 MG TOTAL) BY MOUTH AS NEEDED (TAKE UP TO 2 TIMES PER WEEK FOR LEG SWELLING AS NEEDED) 5/31/24   ALAN Serrano SoloStar 300 units/mL CONCENTRATED U-300 injection pen (1-unit dial) Inject 62 Units under the skin in the morning 4/23/24   Sukumar Clemens DO   venlafaxine (EFFEXOR) 75 mg tablet Take 1 tablet (75 mg total) by mouth in the morning 11/5/24 12/11/24  Joshua Romo MD     Allergies   Allergen Reactions    Molds & Smuts Allergic Rhinitis    Other Allergic Rhinitis     RAGWEED, CAT DANDER, DOG DANDER     Pollen Extract Other (See Comments)     Cold symptoms         Objective :  Temp:  [98.1 °F (36.7 °C)-98.2 °F (36.8 °C)] 98.2 °F (36.8 °C)  HR:  [] 86  BP: (103-152)/(51-66) 137/65  Resp:  [18-20] 18  SpO2:  [91 %-96 %] 96 %  O2 Device: None (Room air)    Physical Exam  Vitals reviewed.   Constitutional:       General: She is not in acute distress.     Appearance: Normal appearance. She is obese. She is not ill-appearing, toxic-appearing or diaphoretic.   HENT:      Head: Normocephalic and atraumatic.      Mouth/Throat:      Mouth: Mucous membranes are dry.      Pharynx: Oropharynx is clear. No oropharyngeal exudate or posterior oropharyngeal erythema.      Comments: Markedly dry mucous membranes  Eyes:      General: No scleral icterus.        Right eye: No discharge.         Left eye: No discharge.      Conjunctiva/sclera: Conjunctivae normal.   Cardiovascular:      Rate and Rhythm: Normal rate and regular  "rhythm.      Pulses: Normal pulses.      Heart sounds: Normal heart sounds. No murmur heard.     No friction rub. No gallop.   Pulmonary:      Effort: No respiratory distress.      Breath sounds: No stridor. No wheezing, rhonchi or rales.      Comments: RR 28 bpm, satting 96% on RA, no accessory muscle use  Chest:      Chest wall: No tenderness.   Abdominal:      General: Bowel sounds are normal. There is no distension.      Palpations: Abdomen is soft.      Tenderness: There is abdominal tenderness (Minimal epigastric and RUQ tenderness to deep palpation). There is no guarding or rebound.   Musculoskeletal:         General: No tenderness or signs of injury.      Right lower leg: Edema (2+ pitting pretibial edema from ankle to knee) present.      Left lower leg: Edema (2+ pitting pretibial edema from ankle to knee) present.   Skin:     General: Skin is warm and dry.      Coloration: Skin is not jaundiced or pale.   Neurological:      Mental Status: She is alert. She is disoriented.      Comments: Oriented to self and to place, but not to time (\"December 15th,\" unsure what year). Requires repeated prompting and limited in her history. Not at baseline per patient and her spouse.   Psychiatric:         Mood and Affect: Mood normal.         Behavior: Behavior normal.        Lines/Drains:            Lab Results: I have reviewed the following results:  Results from last 7 days   Lab Units 02/11/25  0531   WBC Thousand/uL 17.82*   HEMOGLOBIN g/dL 10.5*   HEMATOCRIT % 32.2*   PLATELETS Thousands/uL 197   BANDS PCT % 7   LYMPHO PCT % 9*   MONO PCT % 2*   EOS PCT % 0     Results from last 7 days   Lab Units 02/11/25  1212 02/11/25  0531   SODIUM mmol/L 136 134*   POTASSIUM mmol/L 4.7 5.5*   CHLORIDE mmol/L 107 103   CO2 mmol/L 22 21   BUN mg/dL 28* 31*   CREATININE mg/dL 1.58* 1.65*   ANION GAP mmol/L 7 10   CALCIUM mg/dL 8.8 9.2   ALBUMIN g/dL  --  3.8   TOTAL BILIRUBIN mg/dL  --  0.63   ALK PHOS U/L  --  102   ALT U/L  --  " 18   AST U/L  --  18   GLUCOSE RANDOM mg/dL 327* 469*     Results from last 7 days   Lab Units 02/11/25  0656   INR  1.12     Results from last 7 days   Lab Units 02/11/25  1257 02/11/25  0942 02/11/25  0819 02/11/25  0513   POC GLUCOSE mg/dl 277* 371* 406* 429*     Lab Results   Component Value Date    HGBA1C 12.4 (A) 02/10/2025    HGBA1C 10.8 (H) 08/21/2024    HGBA1C 11.9 (H) 04/22/2024     Results from last 7 days   Lab Units 02/11/25  0656 02/11/25  0531   LACTIC ACID mmol/L 2.0  --    PROCALCITONIN ng/ml  --  2.56*       Imaging Results Review: I reviewed radiology reports from this admission including: chest xray, CT chest, CT head, and CT C-spine.  Other Study Results Review: EKG was reviewed.     Administrative Statements       ** Please Note: This note has been constructed using a voice recognition system. **

## 2025-02-12 LAB
ANION GAP SERPL CALCULATED.3IONS-SCNC: 7 MMOL/L (ref 4–13)
BUN SERPL-MCNC: 19 MG/DL (ref 5–25)
CALCIUM SERPL-MCNC: 9.1 MG/DL (ref 8.4–10.2)
CHLORIDE SERPL-SCNC: 105 MMOL/L (ref 96–108)
CO2 SERPL-SCNC: 23 MMOL/L (ref 21–32)
CREAT SERPL-MCNC: 1.29 MG/DL (ref 0.6–1.3)
CRP SERPL QL: 201.1 MG/L
ERYTHROCYTE [DISTWIDTH] IN BLOOD BY AUTOMATED COUNT: 14.6 % (ref 11.6–15.1)
GFR SERPL CREATININE-BSD FRML MDRD: 39 ML/MIN/1.73SQ M
GLUCOSE SERPL-MCNC: 161 MG/DL (ref 65–140)
GLUCOSE SERPL-MCNC: 168 MG/DL (ref 65–140)
GLUCOSE SERPL-MCNC: 220 MG/DL (ref 65–140)
GLUCOSE SERPL-MCNC: 229 MG/DL (ref 65–140)
GLUCOSE SERPL-MCNC: 99 MG/DL (ref 65–140)
HCT VFR BLD AUTO: 33.9 % (ref 34.8–46.1)
HGB BLD-MCNC: 11 G/DL (ref 11.5–15.4)
MAGNESIUM SERPL-MCNC: 2 MG/DL (ref 1.9–2.7)
MCH RBC QN AUTO: 28.5 PG (ref 26.8–34.3)
MCHC RBC AUTO-ENTMCNC: 32.4 G/DL (ref 31.4–37.4)
MCV RBC AUTO: 88 FL (ref 82–98)
PLATELET # BLD AUTO: 179 THOUSANDS/UL (ref 149–390)
PMV BLD AUTO: 11.4 FL (ref 8.9–12.7)
POTASSIUM SERPL-SCNC: 4 MMOL/L (ref 3.5–5.3)
PROCALCITONIN SERPL-MCNC: 2.92 NG/ML
RBC # BLD AUTO: 3.86 MILLION/UL (ref 3.81–5.12)
SODIUM SERPL-SCNC: 135 MMOL/L (ref 135–147)
TSH SERPL DL<=0.05 MIU/L-ACNC: 2.49 UIU/ML (ref 0.45–4.5)
WBC # BLD AUTO: 15.43 THOUSAND/UL (ref 4.31–10.16)

## 2025-02-12 PROCEDURE — 85027 COMPLETE CBC AUTOMATED: CPT

## 2025-02-12 PROCEDURE — 92610 EVALUATE SWALLOWING FUNCTION: CPT

## 2025-02-12 PROCEDURE — 84443 ASSAY THYROID STIM HORMONE: CPT | Performed by: INTERNAL MEDICINE

## 2025-02-12 PROCEDURE — 99232 SBSQ HOSP IP/OBS MODERATE 35: CPT | Performed by: INTERNAL MEDICINE

## 2025-02-12 PROCEDURE — 82948 REAGENT STRIP/BLOOD GLUCOSE: CPT

## 2025-02-12 PROCEDURE — 83735 ASSAY OF MAGNESIUM: CPT

## 2025-02-12 PROCEDURE — 86140 C-REACTIVE PROTEIN: CPT

## 2025-02-12 PROCEDURE — 80048 BASIC METABOLIC PNL TOTAL CA: CPT

## 2025-02-12 PROCEDURE — 84145 PROCALCITONIN (PCT): CPT

## 2025-02-12 RX ORDER — LABETALOL HYDROCHLORIDE 5 MG/ML
10 INJECTION, SOLUTION INTRAVENOUS EVERY 6 HOURS PRN
Status: DISCONTINUED | OUTPATIENT
Start: 2025-02-12 | End: 2025-02-15 | Stop reason: HOSPADM

## 2025-02-12 RX ORDER — OLANZAPINE 10 MG/2ML
2.5 INJECTION, POWDER, FOR SOLUTION INTRAMUSCULAR ONCE
Status: DISCONTINUED | OUTPATIENT
Start: 2025-02-12 | End: 2025-02-12

## 2025-02-12 RX ORDER — ACETAMINOPHEN 325 MG/1
650 TABLET ORAL ONCE
Status: COMPLETED | OUTPATIENT
Start: 2025-02-12 | End: 2025-02-12

## 2025-02-12 RX ORDER — NYSTATIN 100000 U/G
CREAM TOPICAL 2 TIMES DAILY
Status: DISCONTINUED | OUTPATIENT
Start: 2025-02-12 | End: 2025-02-15 | Stop reason: HOSPADM

## 2025-02-12 RX ADMIN — AZITHROMYCIN MONOHYDRATE 500 MG: 500 INJECTION, POWDER, LYOPHILIZED, FOR SOLUTION INTRAVENOUS at 08:30

## 2025-02-12 RX ADMIN — INSULIN LISPRO 1 UNITS: 100 INJECTION, SOLUTION INTRAVENOUS; SUBCUTANEOUS at 09:10

## 2025-02-12 RX ADMIN — HEPARIN SODIUM 5000 UNITS: 5000 INJECTION INTRAVENOUS; SUBCUTANEOUS at 05:30

## 2025-02-12 RX ADMIN — INSULIN LISPRO 7 UNITS: 100 INJECTION, SOLUTION INTRAVENOUS; SUBCUTANEOUS at 12:00

## 2025-02-12 RX ADMIN — GUAIFENESIN 1200 MG: 600 TABLET, EXTENDED RELEASE ORAL at 09:30

## 2025-02-12 RX ADMIN — VENLAFAXINE 75 MG: 37.5 TABLET ORAL at 09:30

## 2025-02-12 RX ADMIN — HEPARIN SODIUM 5000 UNITS: 5000 INJECTION INTRAVENOUS; SUBCUTANEOUS at 23:09

## 2025-02-12 RX ADMIN — CEFTRIAXONE SODIUM 1000 MG: 10 INJECTION, POWDER, FOR SOLUTION INTRAVENOUS at 08:15

## 2025-02-12 RX ADMIN — INSULIN LISPRO 7 UNITS: 100 INJECTION, SOLUTION INTRAVENOUS; SUBCUTANEOUS at 17:21

## 2025-02-12 RX ADMIN — DILTIAZEM HYDROCHLORIDE 300 MG: 180 CAPSULE, COATED, EXTENDED RELEASE ORAL at 09:30

## 2025-02-12 RX ADMIN — INSULIN LISPRO 7 UNITS: 100 INJECTION, SOLUTION INTRAVENOUS; SUBCUTANEOUS at 09:10

## 2025-02-12 RX ADMIN — ISOSORBIDE MONONITRATE 60 MG: 60 TABLET, EXTENDED RELEASE ORAL at 09:30

## 2025-02-12 RX ADMIN — GUAIFENESIN 1200 MG: 600 TABLET, EXTENDED RELEASE ORAL at 17:21

## 2025-02-12 RX ADMIN — HEPARIN SODIUM 5000 UNITS: 5000 INJECTION INTRAVENOUS; SUBCUTANEOUS at 14:20

## 2025-02-12 RX ADMIN — LEVOTHYROXINE SODIUM 75 MCG: 75 TABLET ORAL at 05:30

## 2025-02-12 RX ADMIN — MELATONIN 3 MG: 3 TAB ORAL at 01:06

## 2025-02-12 RX ADMIN — INSULIN GLARGINE 20 UNITS: 100 INJECTION, SOLUTION SUBCUTANEOUS at 23:09

## 2025-02-12 RX ADMIN — ACETAMINOPHEN 650 MG: 325 TABLET, FILM COATED ORAL at 01:33

## 2025-02-12 RX ADMIN — LOSARTAN POTASSIUM 100 MG: 50 TABLET, FILM COATED ORAL at 09:30

## 2025-02-12 RX ADMIN — SENNOSIDES 8.6 MG: 8.6 TABLET ORAL at 09:30

## 2025-02-12 RX ADMIN — INSULIN LISPRO 2 UNITS: 100 INJECTION, SOLUTION INTRAVENOUS; SUBCUTANEOUS at 12:00

## 2025-02-12 RX ADMIN — PRAVASTATIN SODIUM 40 MG: 40 TABLET ORAL at 17:21

## 2025-02-12 RX ADMIN — INSULIN LISPRO 2 UNITS: 100 INJECTION, SOLUTION INTRAVENOUS; SUBCUTANEOUS at 17:21

## 2025-02-12 NOTE — PLAN OF CARE
Consider VBS to assess for aspiration risk  Cont regular diet   Meds whole in puree   Aspiration precautions

## 2025-02-12 NOTE — ASSESSMENT & PLAN NOTE
Mag 1.3 POA  Received magnesium 2 g IV in the ED  Give additional magnesium 2 g IV this afternoon  Repeat magnesium on a.m. labs is 2.0 wnl  Resolved     Plan:  Continue monitoring in AM labs

## 2025-02-12 NOTE — CASE MANAGEMENT
Case Management Assessment & Discharge Planning Note    Patient name Yajaira Marsh  Location /-01 MRN [id number]  : 1945 Date 2025       Current Admission Date: 2025  Current Admission Diagnosis:Community acquired pneumonia   Patient Active Problem List    Diagnosis Date Noted Date Diagnosed    Toxic metabolic encephalopathy 2025     Community acquired pneumonia 2025     Hypomagnesemia 2025     History of colon polyps 2024     Sore throat 2024     Stage 3b chronic kidney disease (HCC) 2024     Current mild episode of major depressive disorder without prior episode (HCC) 2024     Acute kidney injury superimposed on chronic kidney disease  (HCC) 2024     Diarrhea 10/17/2024     Type 2 diabetes mellitus with complication, with long-term current use of insulin ([facility]) 2024     Seborrheic dermatitis 2024     Obesity (BMI 30-39.9) 10/13/2023     Kidney stone 2023     Constipation 2023     Right sided abdominal pain, secondary to ureterolithiasis 2023     Enlarged liver 2023     Kidney cysts 2023     CRI (chronic renal insufficiency) 2023     Sciatica of right side 2023     Edema of both lower extremities 2023     COVID-19 2023     Kidney mass 2023     Fall 2023     Anxiety 2022     Encounter for screening mammogram for breast cancer 2022     Osteoarthritis of spine with radiculopathy, lumbar region 2022     Protrusion of intervertebral disc of thoracic region 2022     Other constipation 2022     Lumbar radiculopathy 2022     Contracture of hand 2022     Diplopia 2021     Hypothyroidism 2021     Microalbuminuria 2021     Rash 2021     Presence of stent in coronary artery 2021     S/P CABG (coronary artery bypass graft) 2021     Apraxia 2021     SOB (shortness of breath)  08/10/2021     Post herpetic neuralgia 07/15/2021     Herpes zoster without complication 07/01/2021     Neck pain 06/07/2021     Bloating symptom 04/23/2021     Mixed hyperlipidemia 04/06/2021     Lung nodule 02/24/2021     Stage 4 chronic kidney disease (HCC)      Insomnia 08/24/2020     MADAN (generalized anxiety disorder) 04/23/2020     Restrictive lung disease secondary to obesity 10/21/2019     Closed fracture of nasal bones 07/21/2019     Post-nasal drip 01/31/2019     Vision changes 01/31/2019     Chronic pain syndrome 01/16/2019     Lumbar facet arthropathy 01/16/2019     Lumbar spondylosis      SARAY (obstructive sleep apnea) 09/13/2018     Allergic rhinitis 09/13/2018     Pulmonary nodules 09/13/2018     Class 3 severe obesity due to excess calories without serious comorbidity in adult, unspecified BMI ([facility])      Onychomycosis 10/27/2017     Complex endometrial hyperplasia 05/18/2017     Colon polyps 05/04/2017     Thickened endometrium 04/25/2017     Lung nodule 04/25/2017     Panic attacks 04/19/2017     MDD (major depressive disorder), recurrent severe, without psychosis ([facility]) 04/19/2017     Chest pain on breathing 04/19/2017     History of DVT (deep vein thrombosis) 04/19/2017     PVC's (premature ventricular contractions) 04/19/2017     CAD (coronary artery disease) 04/19/2017     Hypertension 04/19/2017     Hyperlipidemia 04/19/2017     Fibromyalgia 04/19/2017     Spinal stenosis of lumbar region 04/19/2017     Vitamin B12 deficiency 12/01/2016     Ventral hernia 08/30/2016     Spondylosis of cervical region without myelopathy or radiculopathy 11/30/2015     Diabetic polyneuropathy associated with type 2 diabetes mellitus (HCC) 10/07/2015     Vitreo-retinal adhesions 04/21/2015     Palpitations 04/20/2015     Adenomatous polyp of colon 11/30/2013     Psoriasis with arthropathy ([facility]) 11/05/2013     Spondylosis of lumbar region without myelopathy or radiculopathy 05/31/2013     Muscular deconditioning  05/31/2013     Vitamin D deficiency 08/31/2012     Retinal edema 08/31/2012       LOS (days): 1  Geometric Mean LOS (GMLOS) (days): 3.5  Days to GMLOS:2.4     OBJECTIVE:    Risk of Unplanned Readmission Score: 20.81         Current admission status: Inpatient       Preferred Pharmacy:   [id number] - [place], [place] - [address]  [facility] [address]  Phone: [phone] Fax: [phone]    Primary Care Provider: Sukumar Clemens DO    Primary Insurance: [employer]  Secondary Insurance:     ASSESSMENT:  Active Health Care Proxies       jocelyne murphy Alternate Health Care Agent - Daughter   Primary Phone: [phone] (Mobile)                           Readmission Root Cause  30 Day Readmission: No    Patient Information  Admitted from:: Home  Mental Status: Confused  During Assessment patient was accompanied by: Not accompanied during assessment  Assessment information provided by:: Daughter  Primary Caregiver: Self  Support Systems: Spouse/significant other, Children  County of Residence: [place]  What city do you live in?: [place]  Home entry access options. Select all that apply.: No steps to enter home  Type of Current Residence: [facility] ([facility] and older community)  Living Arrangements: Lives w/ Spouse/significant other  Is patient a ?: No    Activities of Daily Living Prior to Admission  Functional Status: Independent  Completes ADLs independently?: Yes  Ambulates independently?: Yes  Does patient use assisted devices?: Yes  Assisted Devices (DME) used: Walker  Does patient currently own DME?: Yes  What DME does the patient currently own?: Walker  Does patient have a history of Outpatient Therapy (PT/OT)?: No  Does the patient have a history of Short-Term Rehab?: No  Does patient have a history of HHC?: No  Does patient currently have HHC?: No         Patient Information Continued  Income Source: SSI/SSD  Does patient have prescription coverage?: Yes  Does patient  receive dialysis treatments?: No  Does patient have a history of substance abuse?: No  Does patient have a history of Mental Health Diagnosis?: No         Means of Transportation  Means of Transport to Appts:: Family transport          DISCHARGE DETAILS:    Discharge planning discussed with:: Kathryn-daughter  Freedom of Choice: Yes  Comments - Freedom of Choice: CM was in contact with Kathryn to assist with the completion of this assessment. Kathryn reported she would be agreeable to SNF if recommended. CM made Kathryn aware CM will follow up with her after the Pt's therapy eval.  CM contacted family/caregiver?: Yes             Contacts  Patient Contacts: Kathryn  Relationship to Patient:: Family  Contact Method: Phone  Phone Number: [phone]  Reason/Outcome: Continuity of Care, Discharge Planning    Requested Home Health Care         Is the patient interested in HHC at discharge?:  (TBD)         Other Referral/Resources/Interventions Provided:  Referral Comments: CM made a referral to [facility] per discussion with Kathryn regarding her concerns regarding the Pt and spouse growning need for assistance.         Treatment Team Recommendation:  (TBD)  Discharge Destination Plan::  (TBD)

## 2025-02-12 NOTE — ASSESSMENT & PLAN NOTE
Lab Results   Component Value Date    HGBA1C 12.4 (A) 02/10/2025     Recent Labs     02/11/25  1729 02/11/25  2152 02/12/25  0905 02/12/25  1118   POCGLU 246* 157* 168* 229*   Blood Sugar Average: Last 72 hrs:  (P) 285.375    Recent A1c of 12.4 indicates poorly controlled type 2 diabetes  Patient's  reports sporadic compliance with home insulin regimen  Home regimen insulin aspart 18 units twice daily with meals and Toujeo 62 units QAM  Due to noncompliance, unclear what patient's true insulin requirements are, as home regimen is significantly more than would typically be required for somebody of patient's weight  Suspect hyperglycemia at presentation is multifactorial due to poorly controlled type 2 diabetes mellitus and acute infection     Plan:  Start Lantus 20 units nightly   If blood glucose continues to be elevated, will consider starting Lantus 30 unit nightly.  Start lispro 7 units 3 times daily with meals  Start sliding scale insulin  Avoid hypoglycemia per protocol  Monitor blood glucose and insulin requirements and adjust regimen as indicated

## 2025-02-12 NOTE — TELEPHONE ENCOUNTER
[place] Dexcom G7 Sensor NOT REQUIRED       Pharmacy advised by    [x]Fax  []Phone call    Approval letter scanned into Media Yes    Dispensed on 1/31/25

## 2025-02-12 NOTE — PLAN OF CARE
Problem: PAIN - ADULT  Goal: Verbalizes/displays adequate comfort level or baseline comfort level  Description: Interventions:  - Encourage patient to monitor pain and request assistance  - Assess pain using appropriate pain scale  - Administer analgesics based on type and severity of pain and evaluate response  - Implement non-pharmacological measures as appropriate and evaluate response  - Consider cultural and social influences on pain and pain management  - Notify physician/advanced practitioner if interventions unsuccessful or patient reports new pain  2/12/2025 1456 by Jacqueline Jorgensen RN  Outcome: Progressing  2/12/2025 1456 by Jacqueline Jorgensen RN  Outcome: Progressing     Problem: INFECTION - ADULT  Goal: Absence or prevention of progression during hospitalization  Description: INTERVENTIONS:  - Assess and monitor for signs and symptoms of infection  - Monitor lab/diagnostic results  - Monitor all insertion sites, i.e. indwelling lines, tubes, and drains  - Monitor endotracheal if appropriate and nasal secretions for changes in amount and color  - [place] appropriate cooling/warming therapies per order  - Administer medications as ordered  - Instruct and encourage patient and family to use good hand hygiene technique  - Identify and instruct in appropriate isolation precautions for identified infection/condition  2/12/2025 1456 by Jacqueline Jorgensen RN  Outcome: Progressing  2/12/2025 1456 by Jacqueline Jorgensen RN  Outcome: Progressing  Goal: Absence of fever/infection during neutropenic period  Description: INTERVENTIONS:  - Monitor WBC    2/12/2025 1456 by Jacqueline Jorgensen RN  Outcome: Progressing  2/12/2025 1456 by Jacqueline Jorgensen RN  Outcome: Progressing     Problem: SAFETY ADULT  Goal: Patient will remain free of falls  Description: INTERVENTIONS:  - Educate patient/family on patient safety including physical limitations  - Instruct patient to call for  assistance with activity   - Consult OT/PT to assist with strengthening/mobility   - Keep Call bell within reach  - Keep bed low and locked with side rails adjusted as appropriate  - Keep care items and personal belongings within reach  - Initiate and maintain comfort rounds  - Make Fall Risk Sign visible to staff  - Offer Toileting every  Hours, in advance of need  - Initiate/Maintain alarm  - Obtain necessary fall risk management equipment:   - Apply yellow socks and bracelet for high fall risk patients  - Consider moving patient to room near nurses station  2/12/2025 1456 by Jacqueline Jorgensen RN  Outcome: Progressing  2/12/2025 1456 by Jacqueline Jorgensen RN  Outcome: Progressing  Goal: Maintain or return to baseline ADL function  Description: INTERVENTIONS:  -  Assess patient's ability to carry out ADLs; assess patient's baseline for ADL function and identify physical deficits which impact ability to perform ADLs (bathing, care of mouth/teeth, toileting, grooming, dressing, etc.)  - Assess/evaluate cause of self-care deficits   - Assess range of motion  - Assess patient's mobility; develop plan if impaired  - Assess patient's need for assistive devices and provide as appropriate  - Encourage maximum independence but intervene and supervise when necessary  - Involve family in performance of ADLs  - Assess for home care needs following discharge   - Consider OT consult to assist with ADL evaluation and planning for discharge  - Provide patient education as appropriate  2/12/2025 1456 by Jacqueline Jorgensen RN  Outcome: Progressing  2/12/2025 1456 by Jacqueline Jorgensen RN  Outcome: Progressing  Goal: Maintains/Returns to pre admission functional level  Description: INTERVENTIONS:  - Perform AM-PAC 6 Click Basic Mobility/ Daily Activity assessment daily.  - Set and communicate daily mobility goal to care team and patient/family/caregiver.   - Collaborate with rehabilitation services on mobility  goals if consulted  - Perform Range of Motion  times a day.  - Reposition patient every  hours.  - Dangle patient times a day  - Stand patient  times a day  - Ambulate patient  times a day  - Out of bed to chair  times a day   - Out of bed for meals  times a day  - Out of bed for toileting  - Record patient progress and toleration of activity level   2/12/2025 1456 by Jacqueline Jorgensen RN  Outcome: Progressing  2/12/2025 1456 by Jacqueline Jorgensen RN  Outcome: Progressing     Problem: DISCHARGE PLANNING  Goal: Discharge to home or other facility with appropriate resources  Description: INTERVENTIONS:  - Identify barriers to discharge w/patient and caregiver  - Arrange for needed discharge resources and transportation as appropriate  - Identify discharge learning needs (meds, wound care, etc.)  - Arrange for interpretive services to assist at discharge as needed  - Refer to Case Management Department for coordinating discharge planning if the patient needs post-hospital services based on physician/advanced practitioner order or complex needs related to functional status, cognitive ability, or social support system  2/12/2025 1456 by Jacqueline Jorgensen RN  Outcome: Progressing  2/12/2025 1456 by Jacqueline Jorgensen RN  Outcome: Progressing

## 2025-02-12 NOTE — PROGRESS NOTES
Patient:    MRN:  [id number]    Aidin Request ID:  [id number]    Level of care reserved:  Assisted Living/Congregate    Partner Reserved:  [place] [address] [phone]    Clinical needs requested:    Geography searched:  10 miles around [address]    Start of Service:    Request sent:  11:38am EST on 2/12/2025 by Perla Duncan    Partner reserved:  12:35pm EST on 2/12/2025 by Perla Duncan    Choice list shared:  12:34pm EST on 2/12/2025 by Perla Duncan

## 2025-02-12 NOTE — ASSESSMENT & PLAN NOTE
Unclear mechanism, but occurred overnight getting up from bed in setting of acute infectious process  Patient unsure whether she has had any recent medication changes, longstanding prescription for Xanax as needed  Per patient's spouse, patient walks without assistive device at baseline    Plan:  Check orthostatic vital signs  PT/OT eval and treat  Restart as-needed Xanax given patient is A&Ox3 this morning  Can discuss trial weaning of benzodiazepines outpatient prior discharge

## 2025-02-12 NOTE — UTILIZATION REVIEW
Initial Clinical Review    Admission: Date/Time/Statement:   Admission Orders (From admission, onward)       Ordered        02/11/25 0833  INPATIENT ADMISSION  Once                          Orders Placed This Encounter   Procedures    INPATIENT ADMISSION     Standing Status:   Standing     Number of Occurrences:   1     Level of Care:   Med Surg [16]     Estimated length of stay:   More than 2 Midnights     Certification:   I certify that inpatient services are medically necessary for this patient for a duration of greater than two midnights. See H&P and MD Progress Notes for additional information about the patient's course of treatment.     ED Arrival Information       Expected   -    Arrival   2/11/2025 04:57    Acuity   Urgent              Means of arrival   Ambulance    Escorted by   [facility]    Service   Hospitalist    Admission type   Emergency              Arrival complaint   EMS - Fall             Chief Complaint   Patient presents with    Fall     Pt had a fall earlier today, she states she had a hard time getting up at home and her  would not help her get up. Pt states she needs a lot more help around the house and taking care of herself and going to doctor visits and  does not help. She states that wants her daughter to live with her but can't because she lives in a 55 and older community.        Initial Presentation: 79 y.o. female with hx CAD s/p  CABG and PCI, DM2 complicated by polyneuropathy on insulin, PAD, DVT, asthma, HTN, HLD , hypothyroidism, obesity, fibromyalgia, who presents  to ED from home via EMS for fall overnight. Pt unsure of mechanism of fall. Reports non productive cough x approx 1 week  and endorses nasal congestion, rhinorrhea,. abdominal pain, nausea, vomiting x 1 day . . Pt vomited after dinner last night and became confused/ disoriented  . Per , pt has sporadic compliance with home insulin regimen  .  On exam, markedly dry mucous membranes . Pt  tachypneic, RA sat 99 % .Normal breath sounds .  Minimal epigastric and RUQ tenderness to deep palpation . 2+ pitting pretibial edema from ankle to knee bilat  Oriented to self and to place, but not to time  . Labs :Glucose in 400's , Mag 1.3 , K 5.5 in setting hyperglycemia . WBC 17.82, procal  2.56 CT chest shows multilobar GGO's and tree-in-bud opacities .CT of head negative for acute intracranial etiology.  Pt given 4 units of regular insulin,IV Mag and a 250 cc normal saline bolus, IV abx in ED.Admitted as Inpatient with sepsis 2/2 community acquired PNA, toxic metabolic encephalopathy . Fall . Plan- IV Ceftriaxone , IV  Azithromycin . F/U cvx . Check CRP, monitor labs, vitals . Avoid sedating meds . Hold home as-needed Xanax while acutely altered Monitor mentation . Check orthostatic vitals . PT/OT . Give additional magnesium 2 g IV this afternoon . AM labs . Anticipated Length of Stay/Certification Statement:  Patient will be admitted on an observation basis with an anticipated length of stay of less than 2 midnights secondary to community acquired pneumonia.     Date: 2/12    Day 2:     Pt AAO x 3 , pt repots she can recall fall from yesterday, mechanical. Denies head strike or LOC . Denies any pain . WBC down to 15.43, procal up to 2.92. F/U cx . Continue IV ceftriaxone, IV Azithromycin .On exam, pt on RA, normal breath sounds .  Per speech and language pathologist recommends to consider VBS to assess for aspiration risk giving patient has mild oropharyngeal dysphagia. At this time recommend patient's diet-regular diet with thin liquid  . If blood glucose continues to be elevated, will consider increasing Lantus to 30 unit nightly from 20 U .  BP elevated ,  added prn IV labetalol this afternoon for SBP > / = 180 or DBP >/= 110    .  CBC, BMP, procal in am .     Date: 2/13   Day 3: Has surpassed a 2nd midnight with active treatments and services. Community acquired PNA    Temp 100.6 F early am today . IV  Azithromycin changed to po  today . Continues on IV ceftriaxone . WBC 15.40 ,procal down to 1.59  . Blood cx no growth x 48hrs .  A1c on 12/10 12.4  .Lantus increased to 24 U fro 20 U Q hs today . /63 this am , has nor required any prn IV antihypertensives .  . Patient has no acute complaint no significant event overnight.  Patient is breathing okay, denies shortness of breath and on room air satting 95%. Pt has video barium swallow today Limited assessment due to pt not having her dentures with her for swallow study, therefore solids not assessed, otherwise pt presented with functional-mild oral and pharyngeal swallowing skills; trace amts of thin liquid penetration to VC noted w/ immed, strong cough response. No aspiration observed. Continue reg  diet w/ thin liquids, no straws               ED Treatment-Medication Administration from 02/11/2025 0457 to 02/11/2025 1701         Date/Time Order Dose Route Action     02/11/2025 0804 magnesium sulfate 2 g/50 mL IVPB (premix) 2 g 2 g Intravenous New Bag     02/11/2025 0801 ceftriaxone (ROCEPHIN) 1 g/50 mL in dextrose IVPB 1,000 mg Intravenous New Bag     02/11/2025 0801 sodium chloride 0.9 % bolus 250 mL 250 mL Intravenous New Bag     02/11/2025 0830 insulin regular (HumuLIN R,NovoLIN R) injection 4 Units 4 Units Subcutaneous Given     02/11/2025 0832 azithromycin (ZITHROMAX) 500 mg in sodium chloride 0.9% 250mL IVPB 500 mg 500 mg Intravenous New Bag     02/11/2025 1441 losartan (COZAAR) tablet 100 mg 100 mg Oral Given     02/11/2025 1439 isosorbide mononitrate (IMDUR) 24 hr tablet 60 mg 60 mg Oral Given     02/11/2025 1440 levothyroxine tablet 75 mcg 75 mcg Oral Given     02/11/2025 1439 venlafaxine (EFFEXOR) tablet 75 mg 75 mg Oral Given     02/11/2025 1427 lactated ringers bolus 1,000 mL 1,000 mL Intravenous New Bag     02/11/2025 1434 lactated ringers infusion 125 mL/hr Intravenous New Bag     02/11/2025 1437 guaiFENesin (MUCINEX) 12 hr tablet 1,200 mg 600 mg  Oral Given     02/11/2025 1438 heparin (porcine) subcutaneous injection 5,000 Units 5,000 Units Subcutaneous Given     02/11/2025 1428 magnesium sulfate 2 g/50 mL IVPB (premix) 2 g 2 g Intravenous New Bag     02/11/2025 1432 insulin lispro (HumALOG/ADMELOG) 100 units/mL subcutaneous injection 1-6 Units 4 Units Subcutaneous Given            Scheduled Medications:  azithromycin, 500 mg, Oral, Q24H  cefTRIAXone, 1,000 mg, Intravenous, Q24H  diltiazem, 300 mg, Oral, Daily  fluticasone, 2 spray, Nasal, Daily  guaiFENesin, 1,200 mg, Oral, BID  heparin (porcine), 5,000 Units, Subcutaneous, Q8H [place]  insulin glargine, 24 Units, Subcutaneous, HS  insulin lispro, 1-5 Units, Subcutaneous, HS  insulin lispro, 1-6 Units, Subcutaneous, TID AC  insulin lispro, 7 Units, Subcutaneous, TID With Meals  isosorbide mononitrate, 60 mg, Oral, Daily  levothyroxine, 75 mcg, Oral, Early Morning  losartan, 100 mg, Oral, Daily  nystatin, , Topical, BID  pravastatin, 40 mg, Oral, Daily With Dinner  senna, 1 tablet, Oral, Daily  venlafaxine, 75 mg, Oral, Daily      potassium chloride (Klor-Con M20) CR tablet 20 mEq  Dose: 20 mEq  Freq: Once Route: PO  Start: 02/13/25 0745 End: 02/13/25 0820  insulin glargine (LANTUS) subcutaneous injection 20 Units 0.2 mL  Dose: 20 Units  Freq: Daily at bedtime Route: SC  Start: 02/11/25 2200 End: 02/13/25 1151  azithromycin (ZITHROMAX) 500 mg in sodium chloride 0.9% 250mL IVPB 500 mg  Dose: 500 mg  Freq: Every 24 hours Route: IV  Last Dose: Stopped (02/13/25 1132)  Start: 02/12/25 0800 End: 02/13/25 0849      Continuous IV Infusions:   lactated ringers infusion  Rate: 125 mL/hr Dose: 125 mL/hr  Freq: Continuous Route: IV  Indications of Use: IV Hydration  Last Dose: Stopped (02/11/25 2205)  Start: 02/11/25 1400 End: 02/11/25 2233    PRN Meds:  acetaminophen, 650 mg, Oral, Q6H PRN  ALPRAZolam, 0.5 mg, Oral, BID PRN  benzonatate, 100 mg, Oral, TID PRN  labetalol, 10 mg, Intravenous, Q6H PRN  melatonin, 3 mg, Oral,  HS PRN  ondansetron, 4 mg, Intravenous, Q6H PRN    acetaminophen (TYLENOL) tablet 650 mg  Dose: 650 mg  Freq: Once Route: PO  Start: 02/12/25 0100 End: 02/12/25 0133        ED Triage Vitals   Temperature Pulse Respirations Blood Pressure SpO2 Pain Score   02/11/25 0519 02/11/25 0506 02/11/25 0506 02/11/25 0506 02/11/25 0506 02/11/25 1705   98.1 °F (36.7 °C) 101 20 126/56 94 % No Pain     Weight (last 2 days)       Date/Time Weight    02/13/25 0726 84.4 (186.07)    02/12/25 0600 84.8 (186.95)    02/11/25 1703 85.4 (188.27)            Vital Signs (last 3 days)       Date/Time Temp Pulse Resp BP MAP (mmHg) SpO2 O2 Device Patient Position - Orthostatic VS Pain    02/13/25 15:01:49 -- 75 -- 147/56 86 95 % -- -- --    02/13/25 14:45:43 98.1 °F (36.7 °C) 75 18 98/58 71 96 % -- -- --    02/13/25 07:20:47 98.8 °F (37.1 °C) 82 19 166/63 97 100 % -- -- --    02/13/25 03:13:27 100.6 °F (38.1 °C) 97 -- 166/70 102 94 % -- -- --    02/12/25 2030 -- -- -- -- -- -- None (Room air) -- No Pain    02/12/25 16:16:40 100.4 °F (38 °C) 88 20 168/74 105 97 % None (Room air) Lying --    02/12/25 1455 98.4 °F (36.9 °C) 94 18 188/78 112 94 % None (Room air) Lying --    02/12/25 1125 98.4 °F (36.9 °C) 104 18 193/79 113 -- -- Standing - Orthostatic VS --    02/12/25 1123 98.4 °F (36.9 °C) 102 18 181/74 107 95 % None (Room air) Sitting - Orthostatic VS --    02/12/25 1120 98.4 °F (36.9 °C) 102 17 172/74 106 95 % None (Room air) Lying - Orthostatic VS --    02/12/25 0930 -- -- -- 146/70 -- -- -- -- --    02/12/25 0800 -- -- -- -- -- 96 % None (Room air) -- 6    02/12/25 0133 -- -- -- -- -- -- -- -- 9    02/11/25 2214 -- -- -- -- -- -- -- -- No Pain    02/11/25 2153 98.3 °F (36.8 °C) 92 18 150/66 95 94 % None (Room air) Lying --    02/11/25 1705 -- -- -- -- -- -- -- -- No Pain    02/11/25 1703 98.2 °F (36.8 °C) 86 18 137/65 93 96 % -- -- --    02/11/25 1450 -- 85 -- -- -- 94 % -- -- --    02/11/25 1430 -- 89 20 152/66 95 92 % None (Room air)  Sitting --    02/11/25 1415 -- 87 20 139/60 87 91 % -- -- --    02/11/25 1300 -- 86 20 125/58 84 92 % None (Room air) Lying --    02/11/25 1145 -- 96 20 117/56 80 93 % -- -- --    02/11/25 1130 -- 97 20 117/56 80 96 % None (Room air) Lying --    02/11/25 1115 -- 86 20 115/55 79 94 % -- -- --    02/11/25 1100 -- 84 20 108/53 77 94 % -- -- --    02/11/25 1045 -- 83 20 109/51 74 94 % -- -- --    02/11/25 1030 -- 85 20 103/51 74 94 % None (Room air) Lying --    02/11/25 0930 -- 86 20 109/51 73 92 % None (Room air) Sitting --    02/11/25 0915 -- 85 20 114/51 73 92 % -- -- --    02/11/25 0900 -- 87 20 113/53 77 92 % -- -- --    02/11/25 0845 -- 87 20 112/53 76 91 % -- -- --    02/11/25 0519 98.1 °F (36.7 °C) -- -- -- -- -- -- -- --    02/11/25 0506 -- 101 20 126/56 -- 94 % None (Room air) Lying --              Pertinent Labs/Diagnostic Test Results:   Radiology:  FL barium swallow video w speech   Final Interpretation by TARA VERDUZCO (02/13 1056)      FL barium swallow video w speech   Final Interpretation by  (02/13 1440)      CT chest abdomen pelvis wo contrast   Final Interpretation by Supa Mercado MD (02/11 5105)      1.  Scattered groundglass and tree-in-bud opacities in both lower lobes and right middle lobe, suspicious for an atypical infection.   2.  No acute findings in the abdomen or pelvis.      The study was marked in EPIC for immediate notification.         Workstation performed: [id number]         XR chest 1 view portable   ED Interpretation by Taylor Lopez PA-C (02/11 0707)   No acute cardiopulmonary abnormalities per my read      Final Interpretation by Jamin Kaiser MD (02/11 1117)      No acute cardiopulmonary disease.            Resident: Paul Albarran I, the attending radiologist, have reviewed the images and agree with the final report above.      Workstation performed: [id number]         CT head wo contrast   Final Interpretation by Jabari Hernandez,  MD (02/11 0624)      No acute intracranial process.      No skull fracture.      Chronic microangiopathy.                  Workstation performed: [id number]         CT spine cervical without contrast   Final Interpretation by Jabari Hernandez MD (02/11 0630)      No cervical spine fracture or traumatic malalignment.                  Workstation performed: [id number]           Cardiology:  ECG 12 lead   Final Result by Case Harris MD (02/11 1452)   Normal sinus rhythm   Low voltage QRS   Cannot rule out Anterior infarct , age undetermined   Abnormal ECG   When compared with ECG of 02-Sep-2023 13:09,   Premature ventricular complexes are no longer Present   Confirmed by Case Harris ([id number]) on 2/11/2025 2:52:19 PM        GI:  No orders to display           Results from last 7 days   Lab Units 02/13/25  0524 02/12/25  0550 02/11/25  0531   WBC Thousand/uL 15.40* 15.43* 17.82*   HEMOGLOBIN g/dL 9.9* 11.0* 10.5*   HEMATOCRIT % 29.9* 33.9* 32.2*   PLATELETS Thousands/uL 191 179 197   TOTAL NEUT ABS Thousands/µL 12.69*  --   --    BANDS PCT %  --   --  7         Results from last 7 days   Lab Units 02/13/25  0000 02/12/25  0550 02/11/25  1212 02/11/25  0531   SODIUM mmol/L 134* 135 136 134*   POTASSIUM mmol/L 3.8 4.0 4.7 5.5*   CHLORIDE mmol/L 103 105 107 103   CO2 mmol/L 23 23 22 21   ANION GAP mmol/L 8 7 7 10   BUN mg/dL 12 19 28* 31*   CREATININE mg/dL 1.15 1.29 1.58* 1.65*   EGFR ml/min/1.73sq m 45 39 31 29   CALCIUM mg/dL 9.1 9.1 8.8 9.2   MAGNESIUM mg/dL  --  2.0  --  1.3*     Results from last 7 days   Lab Units 02/11/25  0531   AST U/L 18   ALT U/L 18   ALK PHOS U/L 102   TOTAL PROTEIN g/dL 7.5   ALBUMIN g/dL 3.8   TOTAL BILIRUBIN mg/dL 0.63     Results from last 7 days   Lab Units 02/13/25  1104 02/13/25  0709 02/12/25  2107 02/12/25  1616 02/12/25  1118 02/12/25  0905 02/11/25  2152 02/11/25  1729 02/11/25  1257 02/11/25  0942 02/11/25  0819 02/11/25  0513   POC GLUCOSE mg/dl 206* 167* 99 220*  229* 168* 157* 246* 277* 371* 406* 429*     Results from last 7 days   Lab Units 02/13/25  0000 02/12/25  0550 02/11/25  1212 02/11/25  0531   GLUCOSE RANDOM mg/dL 178* 161* 327* 469*         Results from last 7 days   Lab Units 02/10/25  0932   HEMOGLOBIN A1C  12.4*     Beta- Hydroxybutyrate   Date Value Ref Range Status   02/11/2025 0.19 0.02 - 0.27 mmol/L Final          Results from last 7 days   Lab Units 02/11/25  0531   PH BERNADETTE  7.349   PCO2 BERNADETTE mm Hg 35.0*   PO2 BERNADETTE mm Hg 64.8*   HCO3 BERNADETTE mmol/L 18.8*   BASE EXC BERNADETTE mmol/L -6.0   O2 CONTENT BERNADETTE ml/dL 14.4   O2 HGB, VENOUS % 88.8*         Results from last 7 days   Lab Units 02/11/25  0531   CK TOTAL U/L 145     Results from last 7 days   Lab Units 02/11/25  0721 02/11/25  0531   HS TNI 0HR ng/L  --  8   HS TNI 2HR ng/L 9  --    HSTNI D2 ng/L 1  --          Results from last 7 days   Lab Units 02/11/25  0656   PROTIME seconds 15.2*   INR  1.12   PTT seconds 27     Results from last 7 days   Lab Units 02/12/25  0550 02/11/25  1212   TSH 3RD GENERATON uIU/mL 2.490 2.032     Results from last 7 days   Lab Units 02/13/25  0000 02/12/25  0550 02/11/25  0531   PROCALCITONIN ng/ml 1.59* 2.92* 2.56*     Results from last 7 days   Lab Units 02/11/25  0656   LACTIC ACID mmol/L 2.0                   Results from last 7 days   Lab Units 02/12/25  0550 02/11/25  1212   CRP mg/L 201.1* 112.6*             Results from last 7 days   Lab Units 02/11/25  0800   CLARITY UA  Clear   COLOR UA  Light Yellow   SPEC GRAV UA  1.023   PH UA  5.0   GLUCOSE UA mg/dl >=1000 (1%)*   KETONES UA mg/dl Negative   BLOOD UA  Negative   PROTEIN UA mg/dl Trace*   NITRITE UA  Negative   BILIRUBIN UA  Negative   UROBILINOGEN UA (BE) mg/dl <2.0   LEUKOCYTES UA  Elevated glucose may cause decreased leukocyte values. See urine microscopic for UWBC result*   WBC UA /hpf None Seen   RBC UA /hpf None Seen   BACTERIA UA /hpf None Seen   EPITHELIAL CELLS WET PREP /hpf Occasional     Results from last 7 days    Lab Units 02/11/25  1247   STREP PNEUMONIAE ANTIGEN, URINE  Negative               Results from last 7 days   Lab Units 02/11/25  0656 02/11/25  0548   BLOOD CULTURE  No Growth at 24 hrs. No Growth at 48 hrs.                   Past Medical History:   Diagnosis Date    Acute embolism and thrombosis of unspecified deep veins of unspecified lower extremity ([facility])     Last Assessed:  5/18/17    Anemia     Anosmia     Anxiety     Arthritis     Asthma     Back pain     Bilateral macular retinal edema     CAD (coronary artery disease)     Cataract     Cervical disc herniation     Cervical radiculopathy     Cervical spinal stenosis     Cervical spondylolysis     Chronic kidney disease     Chronic mastoiditis     Colon polyp     Complex endometrial hyperplasia     Depression     Diabetes mellitus (HCC)     Disease of thyroid gland     DVT (deep venous thrombosis) ([facility])     DVT (deep venous thrombosis) ([facility]) 06/16/2017    Fibromyalgia     Fibromyalgia, primary     Hyperlipidemia     Hypertension     Hypothyroid     Kidney stone     Lumbar radiculopathy     Neck pain     Obese     PONV (postoperative nausea and vomiting)     Shingles 07/01/2021    Spinal stenosis     Stomach ulcer     Thyroid disease     Vascular claudication ([facility]) 12/11/2017     Present on Admission:   Fall   Hypertension   Hypothyroidism   Stage 3b chronic kidney disease ([facility])      Admitting Diagnosis: Hyperkalemia [E87.5]  Hypomagnesemia [E83.42]  Altered mental status [R41.82]  Pneumonia [J18.9]  Weakness [R53.1]  Hyperglycemia [R73.9]  Encounter for post fall examination [Z04.3]  Sepsis (HCC) [A41.9]  Age/Sex: 79 y.o. female    Network Utilization Review Department  ATTENTION: Please call with any questions or concerns to [phone] and carefully listen to the prompts so that you are directed to the right person. All voicemails are confidential.   For Discharge needs, contact Care Management DC Support Team at [phone] opt. [phone]  Send all requests for  admission clinical reviews, approved or denied determinations and any other requests to dedicated fax number below belonging to the campus where the patient is receiving treatment. List of dedicated fax numbers for the Facilities:  FACILITY NAME UR FAX NUMBER   ADMISSION DENIALS (Administrative/Medical Necessity) [phone]   DISCHARGE SUPPORT TEAM (NETWORK) [phone]   PARENT CHILD HEALTH (Maternity/NICU/Pediatrics) [phone]   [facility] [phone]   [facility] [phone]   [facility] [phone]   [facility] [phone]   [facility] [phone]   [facility] [phone]   [facility] [phone]   [facility] [phone]   [facility] [phone]   [facility] [phone]   [facility] [phone]   [facility] [phone]

## 2025-02-12 NOTE — ASSESSMENT & PLAN NOTE
Acute cough starting approximately 1 week prior to presentation, nonproductive  Tachypneic on exam, satting well on room air  Patient meeting sepsis criteria for transient tachycardia (101), tachypnea to 28, and leukocytosis (17.82), though tachycardia rapidly resolved.   Labs notable for leukocytosis and elevated Procal of 2.56, CRP elevated at 201.1  Lactic acid within normal limits  CT chest notable for scattered groundglass opacities and tree-in-bud opacities within both lower lobes and most significant in the right middle lobe, suspicious for atypical infection.  Meeting criteria for severe community-acquired pneumonia on the basis of 3 minor criteria: Multilobar opacities, confusion/disorientation, BUN greater than 20  Drip score 0  Per speech and language pathologist recommends to consider VBS to assess for aspiration risk giving patient has mild oropharyngeal dysphagia.  At this time recommend patient's diet-regular diet with thin liquid  Received ceftriaxone and azithromycin in the ED     Plan:  Continue ceftriaxone 1 g every 24 hours  Continue azithromycin 500 mg every 24 hours  Follow up AM procal tomorrow morning  and CBC with differential

## 2025-02-12 NOTE — SPEECH THERAPY NOTE
Speech-Language Pathology Bedside Swallow Evaluation        Patient Name: Yajaira Marsh    Today's Date: 2/12/2025     Problem List  Principal Problem:    Community acquired pneumonia  Active Problems:    Hypertension    Hypothyroidism    Fall    Type 2 diabetes mellitus with complication, with long-term current use of insulin (HCC)    Stage 3b chronic kidney disease (HCC)    Toxic metabolic encephalopathy    Hypomagnesemia         Summary    Pt presents with mild oropharyngeal dysphagia  w/ possible aspiration risk  w/ thin liquids as characterized by suspected decreased oral control with thin liquids, audible swallows and occas cough or throat clearing noted.      Recommendations:   Diet: regular diet and thin liquids   Meds: whole with puree   Frequent Oral care: 2x/day  Aspiration precautions  Other Recommendations/ considerations: consider VBS to asses for aspiration risk.       Current Medical Status  Pt is a 79 y.o. female who presented to [facility]  with community acquired pneumonia.  Per patient's , he has been concerned for patient's acute cough, which she reports she developed approximately 1 week ago.  He corroborates that the cough has been nonproductive that she has not complained of any shortness of breath or difficulty breathing nor been in apparent respiratory distress. He reports that she has otherwise been in her usual state of health until after dinner last night, when she had an episode of emesis and became confused/disoriented.  Patient's  reports that he put her to bed and that she fell asleep without issue while he went to sleep on the sofa in the other room.  Reports that he was then awakened overnight by patient calling for him, and that he found her on the ground adjacent to the bed.      Past medical history:   Please see H&P for details    Special Studies:  CT-chest/abd/pelvis w/o contrast: 2/11/25 LUNGS: Accounting for the motion artifact there are scattered  groundglass and tree-in-bud opacities within both lower lobes and particularly in the right middle lobe, suspicious for a atypical infection. Approximately 6 mm right middle lobe pulmonary   nodule is unchanged (301:62)    Social/Education/Vocational Hx:  Pt lives with family    Swallow Information   Prior speech/swallowing tx: none   Current Risks for Dysphagia & Aspiration: c/o emesis after eating dinner- ? Cough induced.  Current Symptoms/Concerns:  + cough, current pneumonia , c/o difficulty swallowing pills, but denied that they get stuck.   Current Diet: regular diet and thin liquids   Baseline Diet: regular diet and thin liquids  Takes pills- whole w/ water     Baseline Assessment   Behavior/Cognition: alert  Speech/Language Status: able to participate in conversation and able to follow commands  Patient Positioning: upright in bed     Swallow Mechanism Exam   Facial: symmetrical  Labial: WFL  Lingual: WFL  Velum: unable to visualize  Mandible: adequate ROM  Dentition: full dentures  Vocal quality:clear/adequate   Volitional Cough: strong/productive   Respiratory: RA    Consistencies Assessed and Performance   Consistencies Administered: thin liquids, nectar thick, puree, and soft solids    Oral Stage: pt able to bite soft/solids and drink liquids from cup and straw. Mastication/manipulation appeared WNL. Decreased oral control suspected w/ thin liquids by cup> nectar thick liquids.     Pharyngeal Stage: swallow initiation appeared timely. Throat clearing noted x1 w/ nectar thick by straw and cough noted x1 w/ thin liquids by straw.       Esophageal Concerns: none reported      Results Reviewed with: patient, RN, and family   Dysphagia Goals: pt will participate in VBS      Daysi Anderson MA CCC-SLP  Speech Pathologist  [place] license # [id number]  [place] license # [id number]  Available via Secure Chat

## 2025-02-12 NOTE — ASSESSMENT & PLAN NOTE
Lab Results   Component Value Date    HGBA1C 12.4 (A) 02/10/2025     Recent Labs     02/11/25  1729 02/11/25  2152 02/12/25  0905 02/12/25  1118   POCGLU 246* 157* 168* 229*   Blood Sugar Average: Last 72 hrs:  (P) 285.375    Recent A1c of 12.4 indicates poorly controlled type 2 diabetes  Patient's  reports sporadic compliance with home insulin regimen  Home regimen insulin aspart 18 units twice daily with meals and Toujeo 62 units QAM  Due to noncompliance, unclear what patient's true insulin requirements are, as home regimen is significantly more than would typically be required for somebody of patient's weight  Suspect hyperglycemia at presentation is multifactorial due to poorly controlled type 2 diabetes mellitus and acute infection    Plan:  Start Lantus 20 units nightly   If blood glucose is continue to be elevated, will consider starting Lantus 30 unit nightly.  Start lispro 7 units 3 times daily with meals  Start sliding scale insulin  Avoid hypoglycemia per protocol  Monitor blood glucose and insulin requirements and adjust regimen as indicated

## 2025-02-12 NOTE — PROGRESS NOTES
Progress Note - Hospitalist   Name: Yajaira Marsh 79 y.o. female I MRN: [id number]  Unit/Bed#: -01 I Date of Admission: 2/11/2025   Date of Service: 2/12/2025 I Hospital Day: 1    Assessment & Plan  Community acquired pneumonia  Acute cough starting approximately 1 week prior to presentation, nonproductive  Tachypneic on exam, satting well on room air  Patient meeting sepsis criteria for transient tachycardia (101), tachypnea to 28, and leukocytosis (17.82), though tachycardia rapidly resolved.   Labs notable for leukocytosis and elevated Procal of 2.56, CRP elevated at 201.1  Lactic acid within normal limits  CT chest notable for scattered groundglass opacities and tree-in-bud opacities within both lower lobes and most significant in the right middle lobe, suspicious for atypical infection.  Meeting criteria for severe community-acquired pneumonia on the basis of 3 minor criteria: Multilobar opacities, confusion/disorientation, BUN greater than 20  Drip score 0  Per speech and language pathologist recommends to consider VBS to assess for aspiration risk giving patient has mild oropharyngeal dysphagia.  At this time recommend patient's diet-regular diet with thin liquid  Received ceftriaxone and azithromycin in the ED     Plan:  Continue ceftriaxone 1 g every 24 hours  Continue azithromycin 500 mg every 24 hours  Follow up AM procal tomorrow morning  and CBC with differential   Toxic metabolic encephalopathy  Patient acutely altered starting night prior to presentation  Suspect acute toxic metabolic encephalopathy secondary to acute infectious process  Treat community-acquired pneumonia as above  Avoid sedating medications as able  Monitor mentation  Type 2 diabetes mellitus with complication, with long-term current use of insulin ([facility])  Lab Results   Component Value Date    HGBA1C 12.4 (A) 02/10/2025     Recent Labs     02/11/25  1729 02/11/25  2152 02/12/25  0905 02/12/25  1118   POCGLU 246* 157* 168*  229*   Blood Sugar Average: Last 72 hrs:  (P) 285.375    Recent A1c of 12.4 indicates poorly controlled type 2 diabetes  Patient's  reports sporadic compliance with home insulin regimen  Home regimen insulin aspart 18 units twice daily with meals and Toujeo 62 units QAM  Due to noncompliance, unclear what patient's true insulin requirements are, as home regimen is significantly more than would typically be required for somebody of patient's weight  Suspect hyperglycemia at presentation is multifactorial due to poorly controlled type 2 diabetes mellitus and acute infection     Plan:  Start Lantus 20 units nightly   If blood glucose continues to be elevated, will consider starting Lantus 30 unit nightly.  Start lispro 7 units 3 times daily with meals  Start sliding scale insulin  Avoid hypoglycemia per protocol  Monitor blood glucose and insulin requirements and adjust regimen as indicated  Fall  Unclear mechanism, but occurred overnight getting up from bed in setting of acute infectious process  Patient unsure whether she has had any recent medication changes, longstanding prescription for Xanax as needed  Per patient's spouse, patient walks without assistive device at baseline    Plan:  Check orthostatic vital signs  PT/OT eval and treat  Restart as-needed Xanax given patient is A&Ox3 this morning  Can discuss trial weaning of benzodiazepines outpatient prior discharge    Hypertension  Continue home regimen diltiazem 300 mg daily  Continue  and losartan 100 mg daily   Hypothyroidism  TSH 2.490 wnl,   Continue home levothyroxine 75 mcg daily in early morning  Hypomagnesemia  Mag 1.3 POA  Received magnesium 2 g IV in the ED  Give additional magnesium 2 g IV this afternoon  Repeat magnesium on a.m. labs is 2.0 wnl  Resolved     Plan:  Continue monitoring in AM labs     VTE Pharmacologic Prophylaxis: VTE Score: 8 High Risk (Score >/= 5) - Pharmacological DVT Prophylaxis Ordered: heparin. Sequential Compression  Devices Ordered.    Mobility:   Basic Mobility Inpatient Raw Score: 18  JH-HLM Goal: 6: Walk 10 steps or more  JH-HLM Achieved: 4: Move to chair/commode  JH-HLM Goal NOT achieved. Continue with multidisciplinary rounding and encourage appropriate mobility to improve upon JH-HLM goals.    Patient Centered Rounds: I performed bedside rounds with nursing staff today.   Discussions with Specialists or Other Care Team Provider: Speech and language pathologist    Education and Discussions with Family / Patient: Updated  (daughter) via phone.    Current Length of Stay: 1 day(s)  Current Patient Status: Inpatient   Certification Statement: The patient will continue to require additional inpatient hospital stay due to community-acquired pneumonia  Discharge Plan: Anticipate discharge in 24-48 hrs to discharge location to be determined pending rehab evaluations.    Code Status: Level 1 - Full Code    Subjective   Patient is seen and examined by me at bedside.  Patient is ANO x 3.  Patient reports that she can recall the mechanical trip from yesterday.  Patient is denying head strike or loss of consciousness.  Patient is denying any pain at this point.  Patient has no acute complaint or significant event overnight.  Patient is denying chest pain, shortness of breath, abdominal pain, fever, chill, changes with urination or bowel movement.    Objective :  Temp:  [98.2 °F (36.8 °C)-98.4 °F (36.9 °C)] 98.4 °F (36.9 °C)  HR:  [] 94  BP: (137-193)/(65-79) 188/78  Resp:  [17-18] 18  SpO2:  [94 %-96 %] 94 %  O2 Device: None (Room air)    Body mass index is 34.19 kg/m².     Input and Output Summary (last 24 hours):     Intake/Output Summary (Last 24 hours) at 2/12/2025 1528  Last data filed at 2/12/2025 1400  Gross per 24 hour   Intake 1547 ml   Output 1183 ml   Net 364 ml       Physical Exam  Vitals reviewed.   Constitutional:       Appearance: Normal appearance.   Eyes:      General: No scleral icterus.         Right eye: No discharge.         Left eye: No discharge.   Cardiovascular:      Rate and Rhythm: Normal rate and regular rhythm.      Pulses: Normal pulses.      Heart sounds: Normal heart sounds. No murmur heard.     No friction rub.   Pulmonary:      Effort: Pulmonary effort is normal. No respiratory distress.      Breath sounds: Normal breath sounds. No stridor. No wheezing or rales.   Chest:      Chest wall: No tenderness.   Abdominal:      General: Bowel sounds are normal. There is no distension.      Palpations: Abdomen is soft.      Tenderness: There is no abdominal tenderness. There is no guarding.   Musculoskeletal:         General: Normal range of motion.      Cervical back: Normal range of motion.      Right lower leg: No edema.      Left lower leg: No edema.   Skin:     General: Skin is warm.      Capillary Refill: Capillary refill takes less than 2 seconds.      Coloration: Skin is not jaundiced or pale.   Neurological:      General: No focal deficit present.      Mental Status: She is alert and oriented to person, place, and time. Mental status is at baseline.   Psychiatric:         Mood and Affect: Mood normal.         Behavior: Behavior normal.         Thought Content: Thought content normal.         Judgment: Judgment normal.         Lines/Drains:              Lab Results: I have reviewed the following results:   Results from last 7 days   Lab Units 02/12/25  0550 02/11/25  0531   WBC Thousand/uL 15.43* 17.82*   HEMOGLOBIN g/dL 11.0* 10.5*   HEMATOCRIT % 33.9* 32.2*   PLATELETS Thousands/uL 179 197   BANDS PCT %  --  7   LYMPHO PCT %  --  9*   MONO PCT %  --  2*   EOS PCT %  --  0     Results from last 7 days   Lab Units 02/12/25  0550 02/11/25  1212 02/11/25  0531   SODIUM mmol/L 135   < > 134*   POTASSIUM mmol/L 4.0   < > 5.5*   CHLORIDE mmol/L 105   < > 103   CO2 mmol/L 23   < > 21   BUN mg/dL 19   < > 31*   CREATININE mg/dL 1.29   < > 1.65*   ANION GAP mmol/L 7   < > 10   CALCIUM mg/dL 9.1   < >  9.2   ALBUMIN g/dL  --   --  3.8   TOTAL BILIRUBIN mg/dL  --   --  0.63   ALK PHOS U/L  --   --  102   ALT U/L  --   --  18   AST U/L  --   --  18   GLUCOSE RANDOM mg/dL 161*   < > 469*    < > = values in this interval not displayed.     Results from last 7 days   Lab Units 02/11/25  0656   INR  1.12     Results from last 7 days   Lab Units 02/12/25  1118 02/12/25  0905 02/11/25  2152 02/11/25  1729 02/11/25  1257 02/11/25  0942 02/11/25  0819 02/11/25  0513   POC GLUCOSE mg/dl 229* 168* 157* 246* 277* 371* 406* 429*     Results from last 7 days   Lab Units 02/10/25  0932   HEMOGLOBIN A1C  12.4*     Results from last 7 days   Lab Units 02/12/25  0550 02/11/25  0656 02/11/25  0531   LACTIC ACID mmol/L  --  2.0  --    PROCALCITONIN ng/ml 2.92*  --  2.56*       Recent Cultures (last 7 days):   Results from last 7 days   Lab Units 02/11/25  0656 02/11/25  0548   BLOOD CULTURE  Received in Microbiology Lab. Culture in Progress. No Growth at 24 hrs.       Imaging Results Review: I reviewed radiology reports from this admission including: CT chest.  Other Study Results Review: No additional pertinent studies reviewed.    Last 24 Hours Medication List:     Current Facility-Administered Medications:     acetaminophen (TYLENOL) tablet 650 mg, Q6H PRN    ALPRAZolam (XANAX) tablet 0.5 mg, BID PRN    ceftriaxone (ROCEPHIN) 1 g/50 mL in dextrose IVPB, Q24H, Last Rate: 1,000 mg (02/12/25 0815) **AND** azithromycin (ZITHROMAX) 500 mg in sodium chloride 0.9% 250mL IVPB 500 mg, Q24H    benzonatate (TESSALON PERLES) capsule 100 mg, TID PRN    diltiazem (CARDIZEM CD) 24 hr capsule 300 mg, Daily    fluticasone (FLONASE) 50 mcg/act nasal spray 2 spray, Daily    guaiFENesin (MUCINEX) 12 hr tablet 1,200 mg, BID    heparin (porcine) subcutaneous injection 5,000 Units, Q8H [place]    insulin glargine (LANTUS) subcutaneous injection 20 Units 0.2 mL, HS    insulin lispro (HumALOG/ADMELOG) 100 units/mL subcutaneous injection 1-5 Units, HS     insulin lispro (HumALOG/ADMELOG) 100 units/mL subcutaneous injection 1-6 Units, TID AC **AND** Fingerstick Glucose (POCT), TID AC    insulin lispro (HumALOG/ADMELOG) 100 units/mL subcutaneous injection 7 Units, TID With Meals    isosorbide mononitrate (IMDUR) 24 hr tablet 60 mg, Daily    levothyroxine tablet 75 mcg, Early Morning    losartan (COZAAR) tablet 100 mg, Daily    nystatin (MYCOSTATIN) cream, BID    ondansetron (ZOFRAN) injection 4 mg, Q6H PRN    pravastatin (PRAVACHOL) tablet 40 mg, Daily With Dinner    senna (SENOKOT) tablet 8.6 mg, Daily    venlafaxine (EFFEXOR) tablet 75 mg, Daily    Administrative Statements   Today, Patient Was Seen By: Viry Carlos MD      **Please Note: This note may have been constructed using a voice recognition system.**

## 2025-02-12 NOTE — PLAN OF CARE
Problem: PAIN - ADULT  Goal: Verbalizes/displays adequate comfort level or baseline comfort level  Description: Interventions:  - Encourage patient to monitor pain and request assistance  - Assess pain using appropriate pain scale  - Administer analgesics based on type and severity of pain and evaluate response  - Implement non-pharmacological measures as appropriate and evaluate response  - Consider cultural and social influences on pain and pain management  - Notify physician/advanced practitioner if interventions unsuccessful or patient reports new pain  Outcome: Progressing     Problem: INFECTION - ADULT  Goal: Absence or prevention of progression during hospitalization  Description: INTERVENTIONS:  - Assess and monitor for signs and symptoms of infection  - Monitor lab/diagnostic results  - Monitor all insertion sites, i.e. indwelling lines, tubes, and drains  - Monitor endotracheal if appropriate and nasal secretions for changes in amount and color  - [place] appropriate cooling/warming therapies per order  - Administer medications as ordered  - Instruct and encourage patient and family to use good hand hygiene technique  - Identify and instruct in appropriate isolation precautions for identified infection/condition  Outcome: Progressing  Goal: Absence of fever/infection during neutropenic period  Description: INTERVENTIONS:  - Monitor WBC    Outcome: Progressing     Problem: SAFETY ADULT  Goal: Patient will remain free of falls  Description: INTERVENTIONS:  - Educate patient/family on patient safety including physical limitations  - Instruct patient to call for assistance with activity   - Consult OT/PT to assist with strengthening/mobility   - Keep Call bell within reach  - Keep bed low and locked with side rails adjusted as appropriate  - Keep care items and personal belongings within reach  - Initiate and maintain comfort rounds  - Make Fall Risk Sign visible to staff  - Offer Toileting every  Hours,  in advance of need  - Initiate/Maintain alarm  - Obtain necessary fall risk management equipment:   - Apply yellow socks and bracelet for high fall risk patients  - Consider moving patient to room near nurses station  Outcome: Progressing  Goal: Maintain or return to baseline ADL function  Description: INTERVENTIONS:  -  Assess patient's ability to carry out ADLs; assess patient's baseline for ADL function and identify physical deficits which impact ability to perform ADLs (bathing, care of mouth/teeth, toileting, grooming, dressing, etc.)  - Assess/evaluate cause of self-care deficits   - Assess range of motion  - Assess patient's mobility; develop plan if impaired  - Assess patient's need for assistive devices and provide as appropriate  - Encourage maximum independence but intervene and supervise when necessary  - Involve family in performance of ADLs  - Assess for home care needs following discharge   - Consider OT consult to assist with ADL evaluation and planning for discharge  - Provide patient education as appropriate  Outcome: Progressing  Goal: Maintains/Returns to pre admission functional level  Description: INTERVENTIONS:  - Perform AM-PAC 6 Click Basic Mobility/ Daily Activity assessment daily.  - Set and communicate daily mobility goal to care team and patient/family/caregiver.   - Collaborate with rehabilitation services on mobility goals if consulted  - Perform Range of Motion  times a day.  - Reposition patient every  hours.  - Dangle patient  times a day  - Stand patient  times a day  - Ambulate patient  times a day  - Out of bed to chair  times a day   - Out of bed for meals  times a day  - Out of bed for toileting  - Record patient progress and toleration of activity level   Outcome: Progressing     Problem: DISCHARGE PLANNING  Goal: Discharge to home or other facility with appropriate resources  Description: INTERVENTIONS:  - Identify barriers to discharge w/patient and caregiver  - Arrange for  needed discharge resources and transportation as appropriate  - Identify discharge learning needs (meds, wound care, etc.)  - Arrange for interpretive services to assist at discharge as needed  - Refer to Case Management Department for coordinating discharge planning if the patient needs post-hospital services based on physician/advanced practitioner order or complex needs related to functional status, cognitive ability, or social support system  Outcome: Progressing     Problem: Knowledge Deficit  Goal: Patient/family/caregiver demonstrates understanding of disease process, treatment plan, medications, and discharge instructions  Description: Complete learning assessment and assess knowledge base.  Interventions:  - Provide teaching at level of understanding  - Provide teaching via preferred learning methods  Outcome: Progressing

## 2025-02-12 NOTE — ASSESSMENT & PLAN NOTE
Acute cough starting approximately 1 week prior to presentation, nonproductive  Tachypneic on exam, satting well on room air  Patient meeting sepsis criteria for transient tachycardia (101), tachypnea to 28, and leukocytosis (17.82), though tachycardia rapidly resolved.   Labs notable for leukocytosis and elevated Procal of 2.56, CRP elevated at 201.1  Lactic acid within normal limits  CT chest notable for scattered groundglass opacities and tree-in-bud opacities within both lower lobes and most significant in the right middle lobe, suspicious for atypical infection.  Meeting criteria for severe community-acquired pneumonia on the basis of 3 minor criteria: Multilobar opacities, confusion/disorientation, BUN greater than 20  Drip score 0  Received ceftriaxone and azithromycin in the ED    Plan:  Continue ceftriaxone 1 g every 24 hours  Continue azithromycin 500 mg every 24 hours  Follow up AM procal tomorrow morning  and CBC with differential

## 2025-02-13 ENCOUNTER — APPOINTMENT (INPATIENT)
Dept: RADIOLOGY | Facility: HOSPITAL | Age: 80
DRG: 871 | End: 2025-02-13
Payer: COMMERCIAL

## 2025-02-13 LAB
ANION GAP SERPL CALCULATED.3IONS-SCNC: 8 MMOL/L (ref 4–13)
BASOPHILS # BLD AUTO: 0.05 THOUSANDS/ΜL (ref 0–0.1)
BASOPHILS NFR BLD AUTO: 0 % (ref 0–1)
BUN SERPL-MCNC: 12 MG/DL (ref 5–25)
CALCIUM SERPL-MCNC: 9.1 MG/DL (ref 8.4–10.2)
CHLORIDE SERPL-SCNC: 103 MMOL/L (ref 96–108)
CO2 SERPL-SCNC: 23 MMOL/L (ref 21–32)
CREAT SERPL-MCNC: 1.15 MG/DL (ref 0.6–1.3)
EOSINOPHIL # BLD AUTO: 0.09 THOUSAND/ΜL (ref 0–0.61)
EOSINOPHIL NFR BLD AUTO: 1 % (ref 0–6)
ERYTHROCYTE [DISTWIDTH] IN BLOOD BY AUTOMATED COUNT: 14.5 % (ref 11.6–15.1)
GFR SERPL CREATININE-BSD FRML MDRD: 45 ML/MIN/1.73SQ M
GLUCOSE SERPL-MCNC: 107 MG/DL (ref 65–140)
GLUCOSE SERPL-MCNC: 164 MG/DL (ref 65–140)
GLUCOSE SERPL-MCNC: 167 MG/DL (ref 65–140)
GLUCOSE SERPL-MCNC: 178 MG/DL (ref 65–140)
GLUCOSE SERPL-MCNC: 206 MG/DL (ref 65–140)
HCT VFR BLD AUTO: 29.9 % (ref 34.8–46.1)
HGB BLD-MCNC: 9.9 G/DL (ref 11.5–15.4)
IMM GRANULOCYTES # BLD AUTO: 0.13 THOUSAND/UL (ref 0–0.2)
IMM GRANULOCYTES NFR BLD AUTO: 1 % (ref 0–2)
LYMPHOCYTES # BLD AUTO: 1.41 THOUSANDS/ΜL (ref 0.6–4.47)
LYMPHOCYTES NFR BLD AUTO: 9 % (ref 14–44)
MCH RBC QN AUTO: 28.1 PG (ref 26.8–34.3)
MCHC RBC AUTO-ENTMCNC: 33.1 G/DL (ref 31.4–37.4)
MCV RBC AUTO: 85 FL (ref 82–98)
MONOCYTES # BLD AUTO: 1.03 THOUSAND/ΜL (ref 0.17–1.22)
MONOCYTES NFR BLD AUTO: 7 % (ref 4–12)
NEUTROPHILS # BLD AUTO: 12.69 THOUSANDS/ΜL (ref 1.85–7.62)
NEUTS SEG NFR BLD AUTO: 82 % (ref 43–75)
NRBC BLD AUTO-RTO: 0 /100 WBCS
PLATELET # BLD AUTO: 191 THOUSANDS/UL (ref 149–390)
PMV BLD AUTO: 12.3 FL (ref 8.9–12.7)
POTASSIUM SERPL-SCNC: 3.8 MMOL/L (ref 3.5–5.3)
PROCALCITONIN SERPL-MCNC: 1.59 NG/ML
RBC # BLD AUTO: 3.52 MILLION/UL (ref 3.81–5.12)
SODIUM SERPL-SCNC: 134 MMOL/L (ref 135–147)
WBC # BLD AUTO: 15.4 THOUSAND/UL (ref 4.31–10.16)

## 2025-02-13 PROCEDURE — 82948 REAGENT STRIP/BLOOD GLUCOSE: CPT

## 2025-02-13 PROCEDURE — 85025 COMPLETE CBC W/AUTO DIFF WBC: CPT

## 2025-02-13 PROCEDURE — 74230 X-RAY XM SWLNG FUNCJ C+: CPT

## 2025-02-13 PROCEDURE — 84145 PROCALCITONIN (PCT): CPT

## 2025-02-13 PROCEDURE — 99232 SBSQ HOSP IP/OBS MODERATE 35: CPT | Performed by: INTERNAL MEDICINE

## 2025-02-13 PROCEDURE — 92611 MOTION FLUOROSCOPY/SWALLOW: CPT

## 2025-02-13 PROCEDURE — 80048 BASIC METABOLIC PNL TOTAL CA: CPT

## 2025-02-13 PROCEDURE — 87081 CULTURE SCREEN ONLY: CPT

## 2025-02-13 RX ORDER — INSULIN GLARGINE 100 [IU]/ML
24 INJECTION, SOLUTION SUBCUTANEOUS
Status: DISCONTINUED | OUTPATIENT
Start: 2025-02-13 | End: 2025-02-15 | Stop reason: HOSPADM

## 2025-02-13 RX ORDER — AZITHROMYCIN 250 MG/1
500 TABLET, FILM COATED ORAL EVERY 24 HOURS
Status: DISCONTINUED | OUTPATIENT
Start: 2025-02-13 | End: 2025-02-15

## 2025-02-13 RX ORDER — POTASSIUM CHLORIDE 1500 MG/1
20 TABLET, EXTENDED RELEASE ORAL ONCE
Status: COMPLETED | OUTPATIENT
Start: 2025-02-13 | End: 2025-02-13

## 2025-02-13 RX ADMIN — ISOSORBIDE MONONITRATE 60 MG: 60 TABLET, EXTENDED RELEASE ORAL at 08:19

## 2025-02-13 RX ADMIN — INSULIN LISPRO 7 UNITS: 100 INJECTION, SOLUTION INTRAVENOUS; SUBCUTANEOUS at 08:18

## 2025-02-13 RX ADMIN — PRAVASTATIN SODIUM 40 MG: 40 TABLET ORAL at 17:48

## 2025-02-13 RX ADMIN — MELATONIN 3 MG: 3 TAB ORAL at 02:06

## 2025-02-13 RX ADMIN — INSULIN LISPRO 2 UNITS: 100 INJECTION, SOLUTION INTRAVENOUS; SUBCUTANEOUS at 12:34

## 2025-02-13 RX ADMIN — AZITHROMYCIN DIHYDRATE 500 MG: 250 TABLET, FILM COATED ORAL at 10:50

## 2025-02-13 RX ADMIN — BENZONATATE 100 MG: 100 CAPSULE ORAL at 02:06

## 2025-02-13 RX ADMIN — SENNOSIDES 8.6 MG: 8.6 TABLET ORAL at 08:19

## 2025-02-13 RX ADMIN — DILTIAZEM HYDROCHLORIDE 300 MG: 180 CAPSULE, COATED, EXTENDED RELEASE ORAL at 08:19

## 2025-02-13 RX ADMIN — GUAIFENESIN 1200 MG: 600 TABLET, EXTENDED RELEASE ORAL at 08:19

## 2025-02-13 RX ADMIN — ALPRAZOLAM 0.5 MG: 0.5 TABLET ORAL at 23:02

## 2025-02-13 RX ADMIN — NYSTATIN CREAM: 100000 CREAM TOPICAL at 10:52

## 2025-02-13 RX ADMIN — HEPARIN SODIUM 5000 UNITS: 5000 INJECTION INTRAVENOUS; SUBCUTANEOUS at 06:36

## 2025-02-13 RX ADMIN — INSULIN GLARGINE 24 UNITS: 100 INJECTION, SOLUTION SUBCUTANEOUS at 23:02

## 2025-02-13 RX ADMIN — LEVOTHYROXINE SODIUM 75 MCG: 75 TABLET ORAL at 06:36

## 2025-02-13 RX ADMIN — HEPARIN SODIUM 5000 UNITS: 5000 INJECTION INTRAVENOUS; SUBCUTANEOUS at 23:02

## 2025-02-13 RX ADMIN — LOSARTAN POTASSIUM 100 MG: 50 TABLET, FILM COATED ORAL at 08:19

## 2025-02-13 RX ADMIN — POTASSIUM CHLORIDE 20 MEQ: 1500 TABLET, EXTENDED RELEASE ORAL at 08:20

## 2025-02-13 RX ADMIN — GUAIFENESIN 1200 MG: 600 TABLET, EXTENDED RELEASE ORAL at 17:48

## 2025-02-13 RX ADMIN — INSULIN LISPRO 1 UNITS: 100 INJECTION, SOLUTION INTRAVENOUS; SUBCUTANEOUS at 08:18

## 2025-02-13 RX ADMIN — INSULIN LISPRO 7 UNITS: 100 INJECTION, SOLUTION INTRAVENOUS; SUBCUTANEOUS at 17:49

## 2025-02-13 RX ADMIN — INSULIN LISPRO 7 UNITS: 100 INJECTION, SOLUTION INTRAVENOUS; SUBCUTANEOUS at 12:34

## 2025-02-13 RX ADMIN — NYSTATIN CREAM: 100000 CREAM TOPICAL at 17:49

## 2025-02-13 RX ADMIN — VENLAFAXINE 75 MG: 37.5 TABLET ORAL at 08:19

## 2025-02-13 RX ADMIN — INSULIN LISPRO 1 UNITS: 100 INJECTION, SOLUTION INTRAVENOUS; SUBCUTANEOUS at 17:49

## 2025-02-13 RX ADMIN — HEPARIN SODIUM 5000 UNITS: 5000 INJECTION INTRAVENOUS; SUBCUTANEOUS at 15:17

## 2025-02-13 RX ADMIN — CEFTRIAXONE SODIUM 1000 MG: 10 INJECTION, POWDER, FOR SOLUTION INTRAVENOUS at 08:33

## 2025-02-13 NOTE — PLAN OF CARE
Problem: PAIN - ADULT  Goal: Verbalizes/displays adequate comfort level or baseline comfort level  Description: Interventions:  - Encourage patient to monitor pain and request assistance  - Assess pain using appropriate pain scale  - Administer analgesics based on type and severity of pain and evaluate response  - Implement non-pharmacological measures as appropriate and evaluate response  - Consider cultural and social influences on pain and pain management  - Notify physician/advanced practitioner if interventions unsuccessful or patient reports new pain  Outcome: Progressing     Problem: INFECTION - ADULT  Goal: Absence or prevention of progression during hospitalization  Description: INTERVENTIONS:  - Assess and monitor for signs and symptoms of infection  - Monitor lab/diagnostic results  - Monitor all insertion sites, i.e. indwelling lines, tubes, and drains  - Monitor endotracheal if appropriate and nasal secretions for changes in amount and color  - [place] appropriate cooling/warming therapies per order  - Administer medications as ordered  - Instruct and encourage patient and family to use good hand hygiene technique  - Identify and instruct in appropriate isolation precautions for identified infection/condition  Outcome: Progressing  Goal: Absence of fever/infection during neutropenic period  Description: INTERVENTIONS:  - Monitor WBC    Outcome: Progressing     Problem: SAFETY ADULT  Goal: Patient will remain free of falls  Description: INTERVENTIONS:  - Educate patient/family on patient safety including physical limitations  - Instruct patient to call for assistance with activity   - Consult OT/PT to assist with strengthening/mobility   - Keep Call bell within reach  - Keep bed low and locked with side rails adjusted as appropriate  - Keep care items and personal belongings within reach  - Initiate and maintain comfort rounds  - Make Fall Risk Sign visible to staff  - Offer Toileting every 2 Hours,  in advance of need  - Initiate/Maintain bed/chair alarm  - Obtain necessary fall risk management equipment:   - Apply yellow socks and bracelet for high fall risk patients  - Consider moving patient to room near nurses station  Outcome: Progressing  Goal: Maintain or return to baseline ADL function  Description: INTERVENTIONS:  -  Assess patient's ability to carry out ADLs; assess patient's baseline for ADL function and identify physical deficits which impact ability to perform ADLs (bathing, care of mouth/teeth, toileting, grooming, dressing, etc.)  - Assess/evaluate cause of self-care deficits   - Assess range of motion  - Assess patient's mobility; develop plan if impaired  - Assess patient's need for assistive devices and provide as appropriate  - Encourage maximum independence but intervene and supervise when necessary  - Involve family in performance of ADLs  - Assess for home care needs following discharge   - Consider OT consult to assist with ADL evaluation and planning for discharge  - Provide patient education as appropriate  Outcome: Progressing  Goal: Maintains/Returns to pre admission functional level  Description: INTERVENTIONS:  - Perform AM-PAC 6 Click Basic Mobility/ Daily Activity assessment daily.  - Set and communicate daily mobility goal to care team and patient/family/caregiver.   - Collaborate with rehabilitation services on mobility goals if consulted  - Perform Range of Motion 3 times a day.  - Reposition patient every 2 hours.  - Dangle patient 3 times a day  - Stand patient 3 times a day  - Ambulate patient 3 times a day  - Out of bed to chair 3 times a day   - Out of bed for meals 3 times a day  - Out of bed for toileting  - Record patient progress and toleration of activity level   Outcome: Progressing     Problem: DISCHARGE PLANNING  Goal: Discharge to home or other facility with appropriate resources  Description: INTERVENTIONS:  - Identify barriers to discharge w/patient and  caregiver  - Arrange for needed discharge resources and transportation as appropriate  - Identify discharge learning needs (meds, wound care, etc.)  - Arrange for interpretive services to assist at discharge as needed  - Refer to Case Management Department for coordinating discharge planning if the patient needs post-hospital services based on physician/advanced practitioner order or complex needs related to functional status, cognitive ability, or social support system  Outcome: Progressing     Problem: Knowledge Deficit  Goal: Patient/family/caregiver demonstrates understanding of disease process, treatment plan, medications, and discharge instructions  Description: Complete learning assessment and assess knowledge base.  Interventions:  - Provide teaching at level of understanding  - Provide teaching via preferred learning methods  Outcome: Progressing     Problem: Nutrition/Hydration-ADULT  Goal: Nutrient/Hydration intake appropriate for improving, restoring or maintaining nutritional needs  Description: Monitor and assess patient's nutrition/hydration status for malnutrition. Collaborate with interdisciplinary team and initiate plan and interventions as ordered.  Monitor patient's weight and dietary intake as ordered or per policy. Utilize nutrition screening tool and intervene as necessary. Determine patient's food preferences and provide high-protein, high-caloric foods as appropriate.     INTERVENTIONS:  - Monitor oral intake, urinary output, labs, and treatment plans  - Assess nutrition and hydration status and recommend course of action  - Evaluate amount of meals eaten  - Assist patient with eating if necessary   - Allow adequate time for meals  - Recommend/ encourage appropriate diets, oral nutritional supplements, and vitamin/mineral supplements  - Order, calculate, and assess calorie counts as needed  - Recommend, monitor, and adjust tube feedings and TPN/PPN based on assessed needs  - Assess need for  intravenous fluids  - Provide specific nutrition/hydration education as appropriate  - Include patient/family/caregiver in decisions related to nutrition  Outcome: Progressing

## 2025-02-13 NOTE — PROGRESS NOTES
Progress Note - Hospitalist   Name: Yajaira Marsh 79 y.o. female I MRN: [id number]  Unit/Bed#: W -01 I Date of Admission: 2/11/2025   Date of Service: 2/13/2025 I Hospital Day: 2    Assessment & Plan  Community acquired pneumonia  Acute cough starting approximately 1 week prior to presentation, nonproductive  Tachypneic on exam, satting well on room air  Patient meeting sepsis criteria for transient tachycardia (101), tachypnea to 28, and leukocytosis (17.82), though tachycardia rapidly resolved.   Labs notable for leukocytosis and elevated Procal of 2.56, CRP elevated at 201.1  Lactic acid within normal limits  CT chest notable for scattered groundglass opacities and tree-in-bud opacities within both lower lobes and most significant in the right middle lobe, suspicious for atypical infection.  Meeting criteria for severe community-acquired pneumonia on the basis of 3 minor criteria: Multilobar opacities, confusion/disorientation, BUN greater than 20  Drip score 0  Repeat procal trending down  Per speech and language pathologist recommends to consider VBS to assess for aspiration risk giving patient has mild oropharyngeal dysphagia.  At this time recommend patient's diet-regular diet with thin liquid  VBS -unremarkable.  Continue regular diet with thin liquid.  No straws  Received ceftriaxone and azithromycin in the ED     Plan:  Continue ceftriaxone 1 g every 24 hours  Continue azithromycin 500 mg every 24 hours  Follow up AM procal tomorrow morning  and CBC with differential   Toxic metabolic encephalopathy  Patient acutely altered starting night prior to presentation  Suspect acute toxic metabolic encephalopathy secondary to acute infectious process  Treat community-acquired pneumonia as above  Avoid sedating medications as able  Monitor mentation  Type 2 diabetes mellitus with complication, with long-term current use of insulin (formerly [facility])  Lab Results   Component Value Date    HGBA1C 12.4 (A) 02/10/2025      Recent Labs     02/12/25  1616 02/12/25  2107 02/13/25  0709 02/13/25  1104   POCGLU 220* 99 167* 206*   Blood Sugar Average: Last 72 hrs:  (P) [id number]    Recent A1c of 12.4 indicates poorly controlled type 2 diabetes  Patient's  reports sporadic compliance with home insulin regimen  Home regimen insulin aspart 18 units twice daily with meals and Toujeo 62 units QAM  Due to noncompliance, unclear what patient's true insulin requirements are, as home regimen is significantly more than would typically be required for somebody of patient's weight  Suspect hyperglycemia at presentation is multifactorial due to poorly controlled type 2 diabetes mellitus and acute infection     Plan:  Increase Lantus from 20 units to 24 units nightly   If blood glucose continues to be elevated, will consider starting Lantus 30 unit nightly.  Start lispro 7 units 3 times daily with meals  Start sliding scale insulin  Avoid hypoglycemia per protocol  Monitor blood glucose and insulin requirements and adjust regimen as indicated  Fall  Unclear mechanism, but occurred overnight getting up from bed in setting of acute infectious process  Patient unsure whether she has had any recent medication changes, longstanding prescription for Xanax as needed  Per patient's spouse, patient walks without assistive device at baseline    Plan:  Check orthostatic vital signs  PT/OT eval and treat  Restart as-needed Xanax given patient is A&Ox3 this morning  Can discuss trial weaning of benzodiazepines outpatient prior discharge    Hypertension  Continue home regimen diltiazem 300 mg daily  Continue  and losartan 100 mg daily   Hypothyroidism  TSH 2.490 wnl,   Continue home levothyroxine 75 mcg daily in early morning  Stage 3b chronic kidney disease (HCC)  Lab Results   Component Value Date    EGFR 45 02/13/2025    EGFR 39 02/12/2025    EGFR 31 02/11/2025    CREATININE 1.15 02/13/2025    CREATININE 1.29 02/12/2025    CREATININE 1.58 (H)  02/11/2025   Baseline creatinine appears to be 1.5-1.7  Avoid nephrotoxins  Monitor renal indices on BMP    VTE Pharmacologic Prophylaxis: VTE Score: 8 High Risk (Score >/= 5) - Pharmacological DVT Prophylaxis Ordered: heparin. Sequential Compression Devices Ordered.    Mobility:   Basic Mobility Inpatient Raw Score: 18  JH-HLM Goal: 6: Walk 10 steps or more  JH-HLM Achieved: 6: Walk 10 steps or more  JH-HLM Goal NOT achieved. Continue with multidisciplinary rounding and encourage appropriate mobility to improve upon JH-HLM goals.    Patient Centered Rounds: I performed bedside rounds with nursing staff today.   Discussions with Specialists or Other Care Team Provider: Speech and language pathologist    Education and Discussions with Family / Patient: Updated  (daughter) via phone.    Current Length of Stay: 2 day(s)  Current Patient Status: Inpatient   Certification Statement: The patient will continue to require additional inpatient hospital stay due to community-acquired pneumonia  Discharge Plan: Anticipate discharge in 24-48 hrs to discharge location to be determined pending rehab evaluations.    Code Status: Level 1 - Full Code    Subjective   Patient is seen and examined by me at bedside.  Patient is ANO x 3.  Patient has no acute complaint no significant event overnight.  Patient is breathing okay, denies shortness of breath and on room air satting 95%. Patient is denying chest pain, shortness of breath, abdominal pain, fever, chill, changes with urination or bowel movement.    Objective :  Temp:  [98.1 °F (36.7 °C)-100.6 °F (38.1 °C)] 98.1 °F (36.7 °C)  HR:  [75-97] 75  BP: ()/(56-74) 147/56  Resp:  [18-20] 18  SpO2:  [94 %-100 %] 95 %  O2 Device: None (Room air)    Body mass index is 34.03 kg/m².     Input and Output Summary (last 24 hours):     Intake/Output Summary (Last 24 hours) at 2/13/2025 1540  Last data filed at 2/13/2025 1318  Gross per 24 hour   Intake 850 ml   Output --   Net  850 ml       Physical Exam  Vitals reviewed.   Constitutional:       Appearance: Normal appearance.   Eyes:      General: No scleral icterus.        Right eye: No discharge.         Left eye: No discharge.   Cardiovascular:      Rate and Rhythm: Normal rate and regular rhythm.      Pulses: Normal pulses.      Heart sounds: Normal heart sounds. No murmur heard.     No friction rub.   Pulmonary:      Effort: Pulmonary effort is normal. No respiratory distress.      Breath sounds: Normal breath sounds. No stridor. No wheezing or rales.   Chest:      Chest wall: No tenderness.   Abdominal:      General: Bowel sounds are normal. There is no distension.      Palpations: Abdomen is soft.      Tenderness: There is no abdominal tenderness. There is no guarding.   Musculoskeletal:         General: Normal range of motion.      Cervical back: Normal range of motion.      Right lower leg: No edema.      Left lower leg: No edema.   Skin:     General: Skin is warm.      Capillary Refill: Capillary refill takes less than 2 seconds.      Coloration: Skin is not jaundiced or pale.   Neurological:      General: No focal deficit present.      Mental Status: She is alert and oriented to person, place, and time. Mental status is at baseline.   Psychiatric:         Mood and Affect: Mood normal.         Behavior: Behavior normal.         Thought Content: Thought content normal.         Judgment: Judgment normal.         Lines/Drains:              Lab Results: I have reviewed the following results:   Results from last 7 days   Lab Units 02/13/25  0524 02/12/25  0550 02/11/25  0531   WBC Thousand/uL 15.40*   < > 17.82*   HEMOGLOBIN g/dL 9.9*   < > 10.5*   HEMATOCRIT % 29.9*   < > 32.2*   PLATELETS Thousands/uL 191   < > 197   BANDS PCT %  --   --  7   SEGS PCT % 82*  --   --    LYMPHO PCT % 9*  --  9*   MONO PCT % 7  --  2*   EOS PCT % 1  --  0    < > = values in this interval not displayed.     Results from last 7 days   Lab Units  02/13/25  0000 02/11/25  1212 02/11/25  0531   SODIUM mmol/L 134*   < > 134*   POTASSIUM mmol/L 3.8   < > 5.5*   CHLORIDE mmol/L 103   < > 103   CO2 mmol/L 23   < > 21   BUN mg/dL 12   < > 31*   CREATININE mg/dL 1.15   < > 1.65*   ANION GAP mmol/L 8   < > 10   CALCIUM mg/dL 9.1   < > 9.2   ALBUMIN g/dL  --   --  3.8   TOTAL BILIRUBIN mg/dL  --   --  0.63   ALK PHOS U/L  --   --  102   ALT U/L  --   --  18   AST U/L  --   --  18   GLUCOSE RANDOM mg/dL 178*   < > 469*    < > = values in this interval not displayed.     Results from last 7 days   Lab Units 02/11/25  0656   INR  1.12     Results from last 7 days   Lab Units 02/13/25  1104 02/13/25  0709 02/12/25  2107 02/12/25  1616 02/12/25  1118 02/12/25  0905 02/11/25  2152 02/11/25  1729 02/11/25  1257 02/11/25  0942 02/11/25  0819 02/11/25  0513   POC GLUCOSE mg/dl 206* 167* 99 220* 229* 168* 157* 246* 277* 371* 406* 429*     Results from last 7 days   Lab Units 02/10/25  0932   HEMOGLOBIN A1C  12.4*     Results from last 7 days   Lab Units 02/13/25  0000 02/12/25  0550 02/11/25  0656 02/11/25  0531   LACTIC ACID mmol/L  --   --  2.0  --    PROCALCITONIN ng/ml 1.59* 2.92*  --  2.56*       Recent Cultures (last 7 days):   Results from last 7 days   Lab Units 02/11/25  0656 02/11/25  0548   BLOOD CULTURE  No Growth at 24 hrs. No Growth at 48 hrs.       Imaging Results Review: I reviewed radiology reports from this admission including: CT chest.  Other Study Results Review: No additional pertinent studies reviewed.    Last 24 Hours Medication List:     Current Facility-Administered Medications:     acetaminophen (TYLENOL) tablet 650 mg, Q6H PRN    ALPRAZolam (XANAX) tablet 0.5 mg, BID PRN    azithromycin (ZITHROMAX) tablet 500 mg, Q24H    benzonatate (TESSALON PERLES) capsule 100 mg, TID PRN    ceftriaxone (ROCEPHIN) 1 g/50 mL in dextrose IVPB, Q24H, Last Rate: 1,000 mg (02/13/25 0891) **AND** [DISCONTINUED] azithromycin (ZITHROMAX) 500 mg in sodium chloride 0.9%  250mL IVPB 500 mg, Q24H, Last Rate: Stopped (02/13/25 1132)    diltiazem (CARDIZEM CD) 24 hr capsule 300 mg, Daily    fluticasone (FLONASE) 50 mcg/act nasal spray 2 spray, Daily    guaiFENesin (MUCINEX) 12 hr tablet 1,200 mg, BID    heparin (porcine) subcutaneous injection 5,000 Units, Q8H [place]    insulin glargine (LANTUS) subcutaneous injection 24 Units 0.24 mL, HS    insulin lispro (HumALOG/ADMELOG) 100 units/mL subcutaneous injection 1-5 Units, HS    insulin lispro (HumALOG/ADMELOG) 100 units/mL subcutaneous injection 1-6 Units, TID AC **AND** Fingerstick Glucose (POCT), TID AC    insulin lispro (HumALOG/ADMELOG) 100 units/mL subcutaneous injection 7 Units, TID With Meals    isosorbide mononitrate (IMDUR) 24 hr tablet 60 mg, Daily    labetalol (NORMODYNE) injection 10 mg, Q6H PRN    levothyroxine tablet 75 mcg, Early Morning    losartan (COZAAR) tablet 100 mg, Daily    melatonin tablet 3 mg, HS PRN    nystatin (MYCOSTATIN) cream, BID    ondansetron (ZOFRAN) injection 4 mg, Q6H PRN    pravastatin (PRAVACHOL) tablet 40 mg, Daily With Dinner    senna (SENOKOT) tablet 8.6 mg, Daily    venlafaxine (EFFEXOR) tablet 75 mg, Daily    Administrative Statements   Today, Patient Was Seen By: Viry Carlos MD      **Please Note: This note may have been constructed using a voice recognition system.**

## 2025-02-13 NOTE — ASSESSMENT & PLAN NOTE
Acute cough starting approximately 1 week prior to presentation, nonproductive  Tachypneic on exam, satting well on room air  Patient meeting sepsis criteria for transient tachycardia (101), tachypnea to 28, and leukocytosis (17.82), though tachycardia rapidly resolved.   Labs notable for leukocytosis and elevated Procal of 2.56, CRP elevated at 201.1  Lactic acid within normal limits  CT chest notable for scattered groundglass opacities and tree-in-bud opacities within both lower lobes and most significant in the right middle lobe, suspicious for atypical infection.  Meeting criteria for severe community-acquired pneumonia on the basis of 3 minor criteria: Multilobar opacities, confusion/disorientation, BUN greater than 20  Drip score 0  Repeat procal trending down  Per speech and language pathologist recommends to consider VBS to assess for aspiration risk giving patient has mild oropharyngeal dysphagia.  At this time recommend patient's diet-regular diet with thin liquid  VBS -unremarkable.  Continue regular diet with thin liquid.  No straws  Received ceftriaxone and azithromycin in the ED     Plan:  Continue ceftriaxone 1 g every 24 hours  Continue azithromycin 500 mg every 24 hours  Follow up AM procal tomorrow morning  and CBC with differential

## 2025-02-13 NOTE — ASSESSMENT & PLAN NOTE
Lab Results   Component Value Date    HGBA1C 12.4 (A) 02/10/2025     Recent Labs     02/12/25  1616 02/12/25  2107 02/13/25  0709 02/13/25  1104   POCGLU 220* 99 167* 206*   Blood Sugar Average: Last 72 hrs:  (P) [id number]    Recent A1c of 12.4 indicates poorly controlled type 2 diabetes  Patient's  reports sporadic compliance with home insulin regimen  Home regimen insulin aspart 18 units twice daily with meals and Toujeo 62 units QAM  Due to noncompliance, unclear what patient's true insulin requirements are, as home regimen is significantly more than would typically be required for somebody of patient's weight  Suspect hyperglycemia at presentation is multifactorial due to poorly controlled type 2 diabetes mellitus and acute infection     Plan:  Increase Lantus from 20 units to 24 units nightly   If blood glucose continues to be elevated, will consider starting Lantus 30 unit nightly.  Start lispro 7 units 3 times daily with meals  Start sliding scale insulin  Avoid hypoglycemia per protocol  Monitor blood glucose and insulin requirements and adjust regimen as indicated

## 2025-02-13 NOTE — PROCEDURES
Video Swallow Study      Patient Name: Yajaira Marsh  Today's Date: 2/13/2025        Assessment Summary: Limited assessment due to pt not having her dentures with her for swallow study, therefore solids not assessed, otherwise pt presented with functional-mild oral and pharyngeal swallowing skills; trace amts of thin liquid penetration to VC noted w/ immed, strong cough response. No aspiration observed.          Recommendations: cont Regular diet w/ thin liquids, no straws  Meds as tolerated   Aspiration precautions            General Information:   78 yo female referred  for a VBS by Dr. Carlos for dysphagia, assess for possible aspiration due to chronic cough and occas throat clearing noted.     Cognition:  WFL    Speech/Swallow Mech:   Oral motor movements appeared  WFL;   Dentition was  edentulous- left dentures in room ;   Cough was strong .   Respiratory Status: RA;     Current diet: regular w/ thin liquids.    Prior VBS none     Pt was seen in radiology for a Video Barium Swallow Study, seated in the upright position and viewed laterally with the following consistencies: puree, solids, nectar thick liquids, thin liquids, attempted barium pill w/ water and in applesauce, pt not able to transfer and swallow, eventually spit out.       Results are as follows:     **Images are available for review on PACS        Oral Stage: Oral processing appeared WFL for limited consistencies w/o dentures in place. Mild decrease in bolus hold noted. Pt also not able to transfer barium pill w/ water by cup, straw or in applesauce.          Lip Closure: complete   Tongue Control During Bolus Hold: decreased  Bolus Preparation/Mastication: NA, pt refused solids due to not having  dentures   Bolus Transport/Lingual Motion: WFL  Oral Residue: no residue   Velopharyngeal Closure:  complete       Pharyngeal Stage:  pt noted to have decreased tongue base retraction w/ mild vallecular retention.  Consecutive drinking of liquids showed complete airway closure and epiglottic inversion. Trace amt of penetration to VC noted with liquids intermittently when straw drinking w/ strong cough response. No aspiration observed.             Initiation of the Pharyngeal Swallow:  mildly delayed  Laryngeal Elevation: complete   Anterior Hyoid Excursion: complete anterior mvt  Epiglottic Movement/Inversion: complete inversion   Laryngeal Vestibular Closure: complete  Pharyngeal Stripping Wave: complete  Pharyngeal Contraction (from AP view):  NA  Pharyngoesophageal segment Opening: complete distension and duration  Base of Tongue Retraction: decreased   Pharyngeal residue: mild        Penetration/Aspiration:  Thin: 4 (trace amt w/ straw)  Nectar: 1  Honey: NA  Puree: 1  Solid: 1  Response to Aspiration: no aspiration   Strategies/Efficacy: cup drinking    8-Point Penetration-Aspiration Scale   1 Material does not enter the airway   2 Material enters the airway, remains above the vocal folds, and is ejected  from the  airway    3 Material enters the airway, remains above the vocal folds, and is not ejected from the airway   4 Material enters the airway, contacts the vocal folds, and is ejected from the airway   5 Material enters the airway, contacts the vocal folds, and is not ejected from the airway    6 Material enters the airway, passes below the vocal folds and is ejected into the larynx or out of the airway    7 Material enters the airway, passes below the vocal folds, and is not ejected from the trachea despite effort    8 Material enters the airway, passes below the vocal folds, and no effort is made to eject          Esophageal Stage: briefly assessed. Questionable esophageal ring noted in the cervical esophagus which did not impede bolus passage. Otherwise no overt abnormality noted.       Daysi Anderson MA [facility]  Speech Pathologist  [place] license # [id number]  [place] license # [id number]  Available via Secure Chat

## 2025-02-13 NOTE — PLAN OF CARE
Problem: PAIN - ADULT  Goal: Verbalizes/displays adequate comfort level or baseline comfort level  Description: Interventions:  - Encourage patient to monitor pain and request assistance  - Assess pain using appropriate pain scale  - Administer analgesics based on type and severity of pain and evaluate response  - Implement non-pharmacological measures as appropriate and evaluate response  - Consider cultural and social influences on pain and pain management  - Notify physician/advanced practitioner if interventions unsuccessful or patient reports new pain  Outcome: Progressing     Problem: INFECTION - ADULT  Goal: Absence or prevention of progression during hospitalization  Description: INTERVENTIONS:  - Assess and monitor for signs and symptoms of infection  - Monitor lab/diagnostic results  - Monitor all insertion sites, i.e. indwelling lines, tubes, and drains  - Monitor endotracheal if appropriate and nasal secretions for changes in amount and color  - [place] appropriate cooling/warming therapies per order  - Administer medications as ordered  - Instruct and encourage patient and family to use good hand hygiene technique  - Identify and instruct in appropriate isolation precautions for identified infection/condition  Outcome: Progressing  Goal: Absence of fever/infection during neutropenic period  Description: INTERVENTIONS:  - Monitor WBC    Outcome: Progressing     Problem: SAFETY ADULT  Goal: Patient will remain free of falls  Description: INTERVENTIONS:  - Educate patient/family on patient safety including physical limitations  - Instruct patient to call for assistance with activity   - Consult OT/PT to assist with strengthening/mobility   - Keep Call bell within reach  - Keep bed low and locked with side rails adjusted as appropriate  - Keep care items and personal belongings within reach  - Initiate and maintain comfort rounds  - Make Fall Risk Sign visible to staff  - Offer Toileting every  Hours,  in advance of need  - Initiate/Maintain alarm  - Obtain necessary fall risk management equipment:   - Apply yellow socks and bracelet for high fall risk patients  - Consider moving patient to room near nurses station  Outcome: Progressing  Goal: Maintain or return to baseline ADL function  Description: INTERVENTIONS:  -  Assess patient's ability to carry out ADLs; assess patient's baseline for ADL function and identify physical deficits which impact ability to perform ADLs (bathing, care of mouth/teeth, toileting, grooming, dressing, etc.)  - Assess/evaluate cause of self-care deficits   - Assess range of motion  - Assess patient's mobility; develop plan if impaired  - Assess patient's need for assistive devices and provide as appropriate  - Encourage maximum independence but intervene and supervise when necessary  - Involve family in performance of ADLs  - Assess for home care needs following discharge   - Consider OT consult to assist with ADL evaluation and planning for discharge  - Provide patient education as appropriate  Outcome: Progressing  Goal: Maintains/Returns to pre admission functional level  Description: INTERVENTIONS:  - Perform AM-PAC 6 Click Basic Mobility/ Daily Activity assessment daily.  - Set and communicate daily mobility goal to care team and patient/family/caregiver.   - Collaborate with rehabilitation services on mobility goals if consulted  - Perform Range of Motion  times a day.  - Reposition patient every  hours.  - Dangle patient  times a day  - Stand patient  times a day  - Ambulate patient  times a day  - Out of bed to chair  times a day   - Out of bed for meals times a day  - Out of bed for toileting  - Record patient progress and toleration of activity level   Outcome: Progressing     Problem: Knowledge Deficit  Goal: Patient/family/caregiver demonstrates understanding of disease process, treatment plan, medications, and discharge instructions  Description: Complete learning  assessment and assess knowledge base.  Interventions:  - Provide teaching at level of understanding  - Provide teaching via preferred learning methods  Outcome: Progressing     Problem: Nutrition/Hydration-ADULT  Goal: Nutrient/Hydration intake appropriate for improving, restoring or maintaining nutritional needs  Description: Monitor and assess patient's nutrition/hydration status for malnutrition. Collaborate with interdisciplinary team and initiate plan and interventions as ordered.  Monitor patient's weight and dietary intake as ordered or per policy. Utilize nutrition screening tool and intervene as necessary. Determine patient's food preferences and provide high-protein, high-caloric foods as appropriate.     INTERVENTIONS:  - Monitor oral intake, urinary output, labs, and treatment plans  - Assess nutrition and hydration status and recommend course of action  - Evaluate amount of meals eaten  - Assist patient with eating if necessary   - Allow adequate time for meals  - Recommend/ encourage appropriate diets, oral nutritional supplements, and vitamin/mineral supplements  - Order, calculate, and assess calorie counts as needed  - Recommend, monitor, and adjust tube feedings and TPN/PPN based on assessed needs  - Assess need for intravenous fluids  - Provide specific nutrition/hydration education as appropriate  - Include patient/family/caregiver in decisions related to nutrition  Outcome: Progressing

## 2025-02-13 NOTE — PROGRESS NOTES
The azithromycin has / have been converted to Oral per [facility] IV-to-PO Auto-Conversion Protocol for Adults as approved by the Pharmacy and Therapeutics Committee. The patient met all eligible criteria:  1) Age = 18 years old   2) Received at least one dose of the IV form   3) Receiving at least one other scheduled oral/enteral medication   4) Tolerating an oral/enteral diet   and did not have any exclusions:   1) Critical care patient   2) Active GI bleed (IF assessing H2RAs or PPIs)   3) Continuous tube feeding (IF assessing cipro, doxycycline, levofloxacin, minocycline, rifampin, or voriconazole)   4) Receiving PO vancomycin (IF assessing metronidazole)   5) Persistent nausea and/or vomiting   6) Ileus or gastrointestinal obstruction   7) Azalea/nasogastric tube set for continuous suction   8) Specific order not to automatically convert to PO (in the order's comments or if discussed in the most recent Infectious Disease or primary team's progress notes).

## 2025-02-13 NOTE — ASSESSMENT & PLAN NOTE
Lab Results   Component Value Date    EGFR 45 02/13/2025    EGFR 39 02/12/2025    EGFR 31 02/11/2025    CREATININE 1.15 02/13/2025    CREATININE 1.29 02/12/2025    CREATININE 1.58 (H) 02/11/2025   Baseline creatinine appears to be 1.5-1.7  Avoid nephrotoxins  Monitor renal indices on BMP

## 2025-02-14 LAB
ANION GAP SERPL CALCULATED.3IONS-SCNC: 7 MMOL/L (ref 4–13)
BASOPHILS # BLD AUTO: 0.03 THOUSANDS/ΜL (ref 0–0.1)
BASOPHILS NFR BLD AUTO: 0 % (ref 0–1)
BUN SERPL-MCNC: 14 MG/DL (ref 5–25)
CALCIUM SERPL-MCNC: 8.8 MG/DL (ref 8.4–10.2)
CHLORIDE SERPL-SCNC: 104 MMOL/L (ref 96–108)
CO2 SERPL-SCNC: 23 MMOL/L (ref 21–32)
CREAT SERPL-MCNC: 1.3 MG/DL (ref 0.6–1.3)
EOSINOPHIL # BLD AUTO: 0.07 THOUSAND/ΜL (ref 0–0.61)
EOSINOPHIL NFR BLD AUTO: 1 % (ref 0–6)
ERYTHROCYTE [DISTWIDTH] IN BLOOD BY AUTOMATED COUNT: 14.6 % (ref 11.6–15.1)
GFR SERPL CREATININE-BSD FRML MDRD: 39 ML/MIN/1.73SQ M
GLUCOSE SERPL-MCNC: 149 MG/DL (ref 65–140)
GLUCOSE SERPL-MCNC: 151 MG/DL (ref 65–140)
GLUCOSE SERPL-MCNC: 156 MG/DL (ref 65–140)
GLUCOSE SERPL-MCNC: 227 MG/DL (ref 65–140)
GLUCOSE SERPL-MCNC: 94 MG/DL (ref 65–140)
HCT VFR BLD AUTO: 30 % (ref 34.8–46.1)
HGB BLD-MCNC: 9.8 G/DL (ref 11.5–15.4)
IMM GRANULOCYTES # BLD AUTO: 0.15 THOUSAND/UL (ref 0–0.2)
IMM GRANULOCYTES NFR BLD AUTO: 1 % (ref 0–2)
LYMPHOCYTES # BLD AUTO: 1.49 THOUSANDS/ΜL (ref 0.6–4.47)
LYMPHOCYTES NFR BLD AUTO: 14 % (ref 14–44)
MAGNESIUM SERPL-MCNC: 1.6 MG/DL (ref 1.9–2.7)
MCH RBC QN AUTO: 27.9 PG (ref 26.8–34.3)
MCHC RBC AUTO-ENTMCNC: 32.7 G/DL (ref 31.4–37.4)
MCV RBC AUTO: 86 FL (ref 82–98)
MONOCYTES # BLD AUTO: 0.82 THOUSAND/ΜL (ref 0.17–1.22)
MONOCYTES NFR BLD AUTO: 8 % (ref 4–12)
MRSA NOSE QL CULT: NORMAL
NEUTROPHILS # BLD AUTO: 8.4 THOUSANDS/ΜL (ref 1.85–7.62)
NEUTS SEG NFR BLD AUTO: 76 % (ref 43–75)
NRBC BLD AUTO-RTO: 0 /100 WBCS
PLATELET # BLD AUTO: 217 THOUSANDS/UL (ref 149–390)
PMV BLD AUTO: 11.1 FL (ref 8.9–12.7)
POTASSIUM SERPL-SCNC: 4.1 MMOL/L (ref 3.5–5.3)
RBC # BLD AUTO: 3.51 MILLION/UL (ref 3.81–5.12)
SODIUM SERPL-SCNC: 134 MMOL/L (ref 135–147)
WBC # BLD AUTO: 10.96 THOUSAND/UL (ref 4.31–10.16)

## 2025-02-14 PROCEDURE — 83735 ASSAY OF MAGNESIUM: CPT

## 2025-02-14 PROCEDURE — 97163 PT EVAL HIGH COMPLEX 45 MIN: CPT

## 2025-02-14 PROCEDURE — 85025 COMPLETE CBC W/AUTO DIFF WBC: CPT

## 2025-02-14 PROCEDURE — 97167 OT EVAL HIGH COMPLEX 60 MIN: CPT

## 2025-02-14 PROCEDURE — 82948 REAGENT STRIP/BLOOD GLUCOSE: CPT

## 2025-02-14 PROCEDURE — 99232 SBSQ HOSP IP/OBS MODERATE 35: CPT | Performed by: INTERNAL MEDICINE

## 2025-02-14 PROCEDURE — 80048 BASIC METABOLIC PNL TOTAL CA: CPT

## 2025-02-14 RX ORDER — SODIUM CHLORIDE, SODIUM GLUCONATE, SODIUM ACETATE, POTASSIUM CHLORIDE, MAGNESIUM CHLORIDE, SODIUM PHOSPHATE, DIBASIC, AND POTASSIUM PHOSPHATE .53; .5; .37; .037; .03; .012; .00082 G/100ML; G/100ML; G/100ML; G/100ML; G/100ML; G/100ML; G/100ML
75 INJECTION, SOLUTION INTRAVENOUS CONTINUOUS
Status: DISPENSED | OUTPATIENT
Start: 2025-02-14 | End: 2025-02-14

## 2025-02-14 RX ORDER — ENOXAPARIN SODIUM 100 MG/ML
40 INJECTION SUBCUTANEOUS
Status: DISCONTINUED | OUTPATIENT
Start: 2025-02-14 | End: 2025-02-15 | Stop reason: HOSPADM

## 2025-02-14 RX ORDER — MAGNESIUM SULFATE HEPTAHYDRATE 40 MG/ML
2 INJECTION, SOLUTION INTRAVENOUS ONCE
Status: COMPLETED | OUTPATIENT
Start: 2025-02-14 | End: 2025-02-14

## 2025-02-14 RX ORDER — INSULIN LISPRO 100 [IU]/ML
9 INJECTION, SOLUTION INTRAVENOUS; SUBCUTANEOUS
Status: DISCONTINUED | OUTPATIENT
Start: 2025-02-14 | End: 2025-02-15 | Stop reason: HOSPADM

## 2025-02-14 RX ADMIN — NYSTATIN CREAM: 100000 CREAM TOPICAL at 10:02

## 2025-02-14 RX ADMIN — PRAVASTATIN SODIUM 40 MG: 40 TABLET ORAL at 16:47

## 2025-02-14 RX ADMIN — SENNOSIDES 8.6 MG: 8.6 TABLET ORAL at 09:09

## 2025-02-14 RX ADMIN — INSULIN LISPRO 1 UNITS: 100 INJECTION, SOLUTION INTRAVENOUS; SUBCUTANEOUS at 23:30

## 2025-02-14 RX ADMIN — INSULIN LISPRO 7 UNITS: 100 INJECTION, SOLUTION INTRAVENOUS; SUBCUTANEOUS at 08:56

## 2025-02-14 RX ADMIN — SODIUM CHLORIDE, SODIUM GLUCONATE, SODIUM ACETATE, POTASSIUM CHLORIDE, MAGNESIUM CHLORIDE, SODIUM PHOSPHATE, DIBASIC, AND POTASSIUM PHOSPHATE 75 ML/HR: .53; .5; .37; .037; .03; .012; .00082 INJECTION, SOLUTION INTRAVENOUS at 11:51

## 2025-02-14 RX ADMIN — NYSTATIN CREAM: 100000 CREAM TOPICAL at 18:06

## 2025-02-14 RX ADMIN — ENOXAPARIN SODIUM 40 MG: 40 INJECTION SUBCUTANEOUS at 09:08

## 2025-02-14 RX ADMIN — INSULIN GLARGINE 24 UNITS: 100 INJECTION, SOLUTION SUBCUTANEOUS at 23:29

## 2025-02-14 RX ADMIN — FLUTICASONE PROPIONATE 2 SPRAY: 50 SPRAY, METERED NASAL at 09:56

## 2025-02-14 RX ADMIN — INSULIN LISPRO 1 UNITS: 100 INJECTION, SOLUTION INTRAVENOUS; SUBCUTANEOUS at 08:56

## 2025-02-14 RX ADMIN — CEFTRIAXONE SODIUM 1000 MG: 10 INJECTION, POWDER, FOR SOLUTION INTRAVENOUS at 08:57

## 2025-02-14 RX ADMIN — LEVOTHYROXINE SODIUM 75 MCG: 75 TABLET ORAL at 06:40

## 2025-02-14 RX ADMIN — VENLAFAXINE 75 MG: 37.5 TABLET ORAL at 09:08

## 2025-02-14 RX ADMIN — GUAIFENESIN 1200 MG: 600 TABLET, EXTENDED RELEASE ORAL at 09:08

## 2025-02-14 RX ADMIN — AZITHROMYCIN DIHYDRATE 500 MG: 250 TABLET, FILM COATED ORAL at 09:09

## 2025-02-14 RX ADMIN — INSULIN LISPRO 2 UNITS: 100 INJECTION, SOLUTION INTRAVENOUS; SUBCUTANEOUS at 11:55

## 2025-02-14 RX ADMIN — MAGNESIUM SULFATE HEPTAHYDRATE 2 G: 40 INJECTION, SOLUTION INTRAVENOUS at 09:57

## 2025-02-14 RX ADMIN — INSULIN LISPRO 9 UNITS: 100 INJECTION, SOLUTION INTRAVENOUS; SUBCUTANEOUS at 11:56

## 2025-02-14 NOTE — ASSESSMENT & PLAN NOTE
Lab Results   Component Value Date    HGBA1C 12.4 (A) 02/10/2025     Recent Labs     02/13/25  1104 02/13/25  1542 02/13/25  2109 02/14/25  0708   POCGLU 206* 164* 107 156*   Blood Sugar Average: Last 72 hrs:  (P) 226.8    Recent A1c of 12.4 indicates poorly controlled type 2 diabetes  Patient's  reports sporadic compliance with home insulin regimen  Home regimen insulin aspart 18 units twice daily with meals and Toujeo 62 units QAM  Due to noncompliance, unclear what patient's true insulin requirements are, as home regimen is significantly more than would typically be required for somebody of patient's weight  Suspect hyperglycemia at presentation is multifactorial due to poorly controlled type 2 diabetes mellitus and acute infection     Plan:  Continue Lantus at 24 units nightly   If blood glucose continues to be elevated, will consider starting Lantus 30 unit nightly.  Increase lispro to 9 units from 7 units 3 times daily with meals  Start sliding scale insulin  Avoid hypoglycemia per protocol  Monitor blood glucose and insulin requirements and adjust regimen as indicated

## 2025-02-14 NOTE — PHYSICAL THERAPY NOTE
PHYSICAL THERAPY EVALUATION NOTE          Patient Name: Yajaira Marsh  Today's Date: 2/14/2025          AGE:   79 y.o.  Mrn:   [id number]  ADMIT DX:  Hyperkalemia [E87.5]  Hypomagnesemia [E83.42]  Altered mental status [R41.82]  Pneumonia [J18.9]  Weakness [R53.1]  Hyperglycemia [R73.9]  Encounter for post fall examination [Z04.3]  Sepsis (HCC) [A41.9]    Past Medical History:  Past Medical History:   Diagnosis Date    Acute embolism and thrombosis of unspecified deep veins of unspecified lower extremity (HCC)     Last Assessed:  5/18/17    Anemia     Anosmia     Anxiety     Arthritis     Asthma     Back pain     Bilateral macular retinal edema     CAD (coronary artery disease)     Cataract     Cervical disc herniation     Cervical radiculopathy     Cervical spinal stenosis     Cervical spondylolysis     Chronic kidney disease     Chronic mastoiditis     Colon polyp     Complex endometrial hyperplasia     Depression     Diabetes mellitus (HCC)     Disease of thyroid gland     DVT (deep venous thrombosis) (HCC)     DVT (deep venous thrombosis) (HCC) 06/16/2017    Fibromyalgia     Fibromyalgia, primary     Hyperlipidemia     Hypertension     Hypothyroid     Kidney stone     Lumbar radiculopathy     Neck pain     Obese     PONV (postoperative nausea and vomiting)     Shingles 07/01/2021    Spinal stenosis     Stomach ulcer     Thyroid disease     Vascular claudication (HCC) 12/11/2017       Past Surgical History:  Past Surgical History:   Procedure Laterality Date    BACK SURGERY      CARPAL TUNNEL RELEASE      CATARACT EXTRACTION      CHOLECYSTECTOMY      COLONOSCOPY      CORONARY ANGIOPLASTY      CORONARY ARTERY BYPASS GRAFT      CYSTOSCOPY N/A 6/20/2017    Procedure: CYSTOSCOPY;  Surgeon: Tacho Arnold MD;  [facility];  Service: Gynecology Oncology    CYSTOSCOPY      MO LAPS TOTAL HYSTERECT 250 GM/< W/RMVL TUBE/OVARY N/A 6/20/2017     "Procedure: ROBOTIC HYSTERECTOMY; BILATERAL SALPINO-OOPHERECTOMY; umbilical hernia repair.;  Surgeon: Tacho Arnold MD;  [facility];  Service: Gynecology Oncology    KS REPAIR FIRST ABDOMINAL WALL HERNIA N/A 2022    Procedure: REPAIR HERNIA INCISIONAL;  Surgeon: Horacio Scherer DO;  Location[facility];  Service: General    TONSILECTOMY AND ADNOIDECTOMY      TONSILLECTOMY      UMBILICAL HERNIA REPAIR       Length Of Stay: 3        PHYSICAL THERAPY EVALUATION:    Patient's identity confirmed via 2 patient identifiers (full name and ) at start of session       25 1527   PT Last Visit   PT Visit Date 25   Note Type   Note type Evaluation   Pain Assessment   Pain Assessment Tool 0-10   Pain Score No Pain   Restrictions/Precautions   Weight Bearing Precautions Per Order No   Other Precautions Cognitive;Chair Alarm;Bed Alarm;Multiple lines;Fall Risk;1:1  (virtual 1:1)   Home Living   Type of Home House  (55+ community)   Home Layout One level;Performs ADLs on one level;Able to live on main level with bedroom/bathroom  (0 RAFA)   Bathroom Shower/Tub Walk-in shower   Bathroom Toilet Raised   Bathroom Equipment Grab bars in shower;Built-in shower seat   Home Equipment Walker  (RW)   Additional Comments Pt reports she sleeps in a regular bed vs the couch, is often up through the night due to being \"restless\"   Prior Function   Level of [place] Independent with functional mobility;Independent with ADLs;Needs assistance with IADLS   Lives With Spouse   Receives Help From Family  (daughter)   IADLs Independent with medication management;Family/Friend/Other provides transportation   Falls in the last 6 months 1 to 4   Comments PTA pt reports she was walking independently w/ a walker \"most of the time\", admits to leaving multiple canes at the [employer] store   General   Additional Pertinent History per RN pt on virtual 1:1 due to pt impulsively getting out of bed and walking multiple times " "  Family/Caregiver Present No   Cognition   Overall Cognitive Status Impaired   Arousal/Participation Cooperative   Orientation Level Oriented X4   Memory Decreased short term memory;Decreased recall of recent events;Decreased recall of precautions   Following Commands Follows one step commands without difficulty   Comments Pt ID via name and ; pt agreeable to PT eval and mobility, very pleasant throughout   Subjective   Subjective \"I'm a klutz!!\", \"I live dangerously\"   Strength RLE   RLE Overall Strength   (grossly assessed w/ functional mobility, at least 3+/5)   Strength LLE   LLE Overall Strength   (grossly assessed w/ functional mobility, at least 3+/5)   Bed Mobility   Supine to Sit 4  Minimal assistance   Additional items Assist x 1;HOB elevated;Bedrails;Increased time required;Verbal cues   Additional Comments pt declined lightheadedness and/or dizziness w/ changes in position   Transfers   Sit to Stand 5  Supervision   Additional items Assist x 1;Increased time required;Verbal cues   Stand to Sit 5  Supervision   Additional items Assist x 1;Armrests;Increased time required;Verbal cues   Additional Comments 2 sit<>stand transfers   Ambulation/Elevation   Gait pattern Decreased foot clearance;Short stride;Decreased hip extension   Gait Assistance 5  Supervision   Additional items Assist x 1;Verbal cues   Assistive Device Rolling walker   Distance 110'   Ambulation/Elevation Additional Comments pt demonstrated ability to ambulate into the hallway w/ the RW, negotiate multiple turns, and obstacles w/o evidence of LOB   Balance   Static Sitting Good   Dynamic Sitting Fair +   Static Standing Fair  (w/ RW)   Ambulatory Fair  (w/ RW)   Activity Tolerance   Activity Tolerance Patient tolerated treatment well   Medical Staff Made Aware Pt benefited from PT/OT care coordination w/ OT Taylor due to allow for challenge of pt's activity tolerance, PT and OT goals were addressed individually during session   Nurse " "Made Aware RN [place]   Assessment   Prognosis Good   Problem List Decreased strength;Impaired balance;Decreased mobility;Decreased cognition;Impaired judgement;Decreased safety awareness   Assessment Yajaira Marsh is a 79 y.o. Female who presents to [facility] on 2/11/25 due to fall and diagnosis of community acquired pneumonia. Orders for PT eval and treat received. Comorbidities affecting pt's functional mobility at time of evaluation include: DM, CKD, fibromyalgia, toxic metabolic encephalopathy, chronic pain syndrome, anxiety. Personal factors affecting DC include: ambulating w/ assistive device, decreased cognition, positive fall history, and inability to perform IADLs. At baseline, pt mobilizes ind w/ RW \"most of the time\", and w/ 1-4 fall(s) in the previous 6 months. Upon evaluation, pt presents w/ the following deficits: impaired strength, impaired balance, impaired cognition, decreased safety awareness, and gait deviations. Pt currently requires  min Ax1 for bed mobility, supervision for transfers, supervision w/ RW for ambulation. Pt's clinical presentation is unstable/unpredictable due to poor blood pressure control, abnormal lab values, need for increased assistance w/ functional mobility compared to baseline, need for input for mobility technique, need to input for safety awareness, ongoing medical management. From a PT/mobility standpoint given the above findings, DC recommendation is level: III (Minimum Rehab Resource Intensity). During current admission, pt will benefit from continued skilled inpatient PT in the acute care setting in order to address the above deficits and to maximize function and mobility prior to DC from acute care.   Goals   [facility] Expiration Date 02/24/25   Short Term Goal #1 Pt will: perform bed mobility w/ mod I to decrease pt's burden of care and increase pt's independence w/ repositioning in bed; perform transfers w/ mod I to promote OOB mobility; ambulate 200' w/ RW and mod I to " increase pt's ambulatory endurance/tolerance; increase all balance ratings by at least 1 grade to decrease pt's risk of falls   PT Treatment Day 0   Plan   Treatment/Interventions Functional transfer training;LE strengthening/ROM;Therapeutic exercise;Endurance training;Cognitive reorientation;Patient/family training;Equipment eval/education;Gait training;Bed mobility;Compensatory technique education   PT Frequency 2-3x/wk   Discharge Recommendation   Rehab Resource Intensity Level, PT III (Minimum Resource Intensity)   Equipment Recommended Walker   Walker Package Recommended Wheeled walker   Change/add to Walker Package? No   Additional Comments Pt educated on and encouraged to continue to use the RW while ambulating   AM-PAC Basic Mobility Inpatient   Turning in Flat Bed Without Bedrails 3   Lying on Back to Sitting on Edge of Flat Bed Without Bedrails 3   Moving Bed to Chair 3   Standing Up From Chair Using Arms 3   Walk in Room 3   Climb 3-5 Stairs With Railing 2   Basic Mobility Inpatient Raw Score 17   Basic Mobility Standardized Score 39.67   [facility] Highest Level Of Mobility   -[facility] Goal 5: Stand one or more mins   -HLM Achieved 7: Walk 25 feet or more   End of Consult   Patient Position at End of Consult Bedside chair;Bed/Chair alarm activated;All needs within reach  (virtual 1:1 confirmed pt w/in sight)       The patient's AM-PAC Basic Mobility Inpatient Short Form Raw Score is 17. A Raw score of greater than 16 suggests the patient may benefit from discharge to home. Please also refer to the recommendation of the Physical Therapist for safe discharge planning.    Pt will benefit from skilled inpatient PT during this admission in order to facilitate progress towards goals and to maximize functional independence prior to DC      DC rec: level III (Minimum Rehab Resource Intensity)        Neisha Ernandez, PT, DPT  02/14/25

## 2025-02-14 NOTE — ASSESSMENT & PLAN NOTE
Unclear mechanism, but occurred overnight getting up from bed in setting of acute infectious process  Patient unsure whether she has had any recent medication changes, longstanding prescription for Xanax as needed  Per patient's spouse, patient walks without assistive device at baseline  Orthostatic blood pressure positive, repeat orthostatic blood pressure today is positive    Plan:  Isolyte 75 cc/h for 12 hours for orthostatic blood pressure  Thigh-high compression stocking  PT/OT eval and treat  Restart as-needed Xanax as needed given patient is A&Ox3 this morning  Can discuss trial weaning of benzodiazepines outpatient prior discharge

## 2025-02-14 NOTE — ASSESSMENT & PLAN NOTE
Lab Results   Component Value Date    EGFR 45 02/13/2025    EGFR 39 02/12/2025    EGFR 31 02/11/2025    CREATININE 1.15 02/13/2025    CREATININE 1.29 02/12/2025    CREATININE 1.58 (H) 02/11/2025

## 2025-02-14 NOTE — PLAN OF CARE
Problem: OCCUPATIONAL THERAPY ADULT  Goal: Performs self-care activities at highest level of function for planned discharge setting.  See evaluation for individualized goals.  Description: Treatment Interventions: ADL retraining, Functional transfer training, Endurance training, Patient/family training, Equipment evaluation/education, Compensatory technique education, Continued evaluation, Activityengagement (ACLS)          See flowsheet documentation for full assessment, interventions and recommendations.   Note: Limitation: Decreased ADL status, Decreased Safe judgement during ADL, Decreased UE strength, Decreased cognition, Decreased high-level ADLs, Decreased self-care trans  Prognosis: Good  Assessment: Patient is a 79 y.o. female seen for OT evaluation at [facility] following admission on 2/11/2025  s/p Community acquired pneumonia. Please see above for comprehensive list of comorbidities and significant PMHx impacting functional performance.  Upon initial evaluation, pt appears to be performing below baseline functional status.   Occupational performance is affected by the following deficits:  decreased muscular strength , decreased balance , decreased standing tolerance for self care tasks , impaired judgement and problem solving , and impaired safety awareness . Personal/Environmental factors impacting D/C include: (+) Hx of falls  and baseline cognitive deficits. Supporting factors include: support system available Patient would benefit from OT services within the acute care setting to maximize level of functional independence in the following areas self-care transfers, functional mobility, and ADLs.  From OT standpoint, recommendation at time of D/C would be Level 3: minimum resource intensity .  Recommendation: Geriatric Consult (ambulatory referral if not already established)  Rehab Resource Intensity Level, OT: (S) III (Minimum Resource Intensity) (if patient to be left alone at home,  recommend ACLS for formal safety/supervision recommendations)        Taylor Whatley, OT

## 2025-02-14 NOTE — OCCUPATIONAL THERAPY NOTE
Occupational Therapy Evaluation     Patient Name: Yajaira Marsh  Today's Date: 2/14/2025  Problem List  Principal Problem:    Community acquired pneumonia  Active Problems:    Fall    Type 2 diabetes mellitus with complication, with long-term current use of insulin (HCC)    Stage 3b chronic kidney disease (HCC)    Toxic metabolic encephalopathy    Past Medical History  Past Medical History:   Diagnosis Date    Acute embolism and thrombosis of unspecified deep veins of unspecified lower extremity ([facility])     Last Assessed:  5/18/17    Anemia     Anosmia     Anxiety     Arthritis     Asthma     Back pain     Bilateral macular retinal edema     CAD (coronary artery disease)     Cataract     Cervical disc herniation     Cervical radiculopathy     Cervical spinal stenosis     Cervical spondylolysis     Chronic kidney disease     Chronic mastoiditis     Colon polyp     Complex endometrial hyperplasia     Depression     Diabetes mellitus (HCC)     Disease of thyroid gland     DVT (deep venous thrombosis) ([facility])     DVT (deep venous thrombosis) ([facility]) 06/16/2017    Fibromyalgia     Fibromyalgia, primary     Hyperlipidemia     Hypertension     Hypothyroid     Kidney stone     Lumbar radiculopathy     Neck pain     Obese     PONV (postoperative nausea and vomiting)     Shingles 07/01/2021    Spinal stenosis     Stomach ulcer     Thyroid disease     Vascular claudication ([facility]) 12/11/2017     Past Surgical History  Past Surgical History:   Procedure Laterality Date    BACK SURGERY      CARPAL TUNNEL RELEASE      CATARACT EXTRACTION      CHOLECYSTECTOMY      COLONOSCOPY      CORONARY ANGIOPLASTY      CORONARY ARTERY BYPASS GRAFT      CYSTOSCOPY N/A 6/20/2017    Procedure: CYSTOSCOPY;  Surgeon: Tacho Arnold MD;  [facility]: [facility];  Service: Gynecology Oncology    CYSTOSCOPY      OH LAPS TOTAL HYSTERECT 250 GM/< W/RMVL TUBE/OVARY N/A 6/20/2017    Procedure: ROBOTIC HYSTERECTOMY; BILATERAL SALPINO-OOPHERECTOMY;  "umbilical hernia repair.;  Surgeon: Tacho Arnold MD;  [facility];  Service: Gynecology Oncology    HI REPAIR FIRST ABDOMINAL WALL HERNIA N/A 5/12/2022    Procedure: REPAIR HERNIA INCISIONAL;  Surgeon: Horacio Scherer DO;  Location: [facility];  Service: General    TONSILECTOMY AND ADNOIDECTOMY      TONSILLECTOMY      UMBILICAL HERNIA REPAIR           02/14/25 1530   OT Last Visit   OT Visit Date 02/14/25   Note Type   Note type Evaluation   Pain Assessment   Pain Assessment Tool 0-10   Pain Score No Pain   Restrictions/Precautions   Weight Bearing Precautions Per Order No   Other Precautions Chair Alarm;Bed Alarm;1:1;Multiple lines;Fall Risk  (virtual 1:1 ,)   Home Living   Type of Home House  (55+ community)   Home Layout One level;Performs ADLs on one level;Able to live on main level with bedroom/bathroom;Access  (0 RAFA)   Bathroom Shower/Tub Walk-in shower   Bathroom Toilet Raised   Bathroom Equipment Grab bars in shower;Built-in shower seat   Bathroom Accessibility Accessible   Home Equipment Walker  (Rw , reports lost her canes after leaving them [place] [employer] multiple times)   Additional Comments Pt sleeps in flat bed at home. Reports often coming out into living room to sleep on the couch d/t feeling \"restless\"   Prior Function   Level of [place] Independent with ADLs;Independent with functional mobility;Needs assistance with IADLS   Lives With Spouse   Receives Help From Family  (daughter)   IADLs Independent with meal prep;Independent with medication management;Family/Friend/Other provides transportation  (reports spouse and her sturggle with household tasks d/t feeling \"overwhelmed\".)   Falls in the last 6 months 1 to 4  (1 leading to admission. Pt denies others but questionable accuracy)   Vocational Retired   Comments Pt reports using walker \"most \" of the time but then later reports not using consistently.   Lifestyle   Autonomy PTA, pt is (I) c ADLs, A with IADLs. no AD  vs RW " use. Lives c spouse.   Reciprocal Relationships Daughter, spouse.   Service to Others retired   General   Additional Pertinent History Pt admitted d/t fall complicated by Community axquired PNA and toxic metabolic encephalopathy, CKD.   Family/Caregiver Present No   Subjective   Subjective Pt agreeable to OT session. enjoys telling jokes throughout session.   ADL   Eating Assistance 5  Supervision/Setup   Grooming Assistance 5  Supervision/Setup   UB Bathing Assistance 5  Supervision/Setup   LB Bathing Assistance 4  Minimal Assistance   UB Dressing Assistance 5  Supervision/Setup   LB Dressing Assistance 5  Supervision/Setup   LB Dressing Deficit   (donned underwear through LEs and pulled over hips with supervision. increased effort)   Toileting Assistance  5  Supervision/Setup   Functional Assistance 5  Supervision/Setup   Additional Comments cues for safe body mechanics, increased time required   Bed Mobility   Supine to Sit 4  Minimal assistance   Additional items Assist x 1;Increased time required;Verbal cues   Additional Comments declined lightheaded/dizziness c positional changes   Transfers   Sit to Stand 5  Supervision   Additional items Assist x 1;Increased time required;Verbal cues   Stand to Sit 5  Supervision   Additional items Assist x 1;Increased time required;Verbal cues   Additional Comments sit<>stand x 2 completed from EOB vs recliner. safe body mechanics demonstrated   Functional Mobility   Functional Mobility 5  Supervision   Additional Comments short community distances into hallway. no LOB.   Additional items Rolling walker   Balance   Static Sitting Good   Dynamic Sitting Fair +   Static Standing Fair   Dynamic Standing Fair   Activity Tolerance   Activity Tolerance Patient tolerated treatment well   Medical Staff Made Aware Care coordination c PT Neisha. JUVENAL.   Nurse Made Aware GAMA Anaya pre/post   RUE Assessment   RUE Assessment WFL  (MMT grossly 4/5 based on functional assessment)   ARTI  Assessment   LUE Assessment WFL  (MMT grossly 4/5 based on functional assessment)   Hand Function   Gross Motor Coordination Functional   Fine Motor Coordination Functional   Sensation   Light Touch No apparent deficits   Vision-Basic Assessment   Current Vision Wears glasses all the time   Cognition   Overall Cognitive Status Impaired   Arousal/Participation Alert;Cooperative   Attention Within functional limits   Orientation Level Oriented X4   Memory Decreased recall of precautions;Decreased recall of recent events;Decreased short term memory   Following Commands Follows one step commands without difficulty   Comments (S)  Pt with poor insight, safety awareness, problem solving. Highly recommend ACLS for formal supervision / safety recommendations d/t concern for safe med management and safety at home if patient is to be home alone. Recommend assistance c medication management.   Assessment   Limitation Decreased ADL status;Decreased Safe judgement during ADL;Decreased UE strength;Decreased cognition;Decreased high-level ADLs;Decreased self-care trans   Prognosis Good   Assessment Patient is a 79 y.o. female seen for OT evaluation at [facility] following admission on 2/11/2025  s/p Community acquired pneumonia. Please see above for comprehensive list of comorbidities and significant PMHx impacting functional performance.  Upon initial evaluation, pt appears to be performing below baseline functional status.   Occupational performance is affected by the following deficits:  decreased muscular strength , decreased balance , decreased standing tolerance for self care tasks , impaired judgement and problem solving , and impaired safety awareness . Personal/Environmental factors impacting D/C include: (+) Hx of falls  and baseline cognitive deficits. Supporting factors include: support system available Patient would benefit from OT services within the acute care setting to maximize level of functional  independence in the following areas self-care transfers, functional mobility, and ADLs.  From OT standpoint, recommendation at time of D/C would be Level 3: minimum resource intensity .   Goals   Patient Goals to return home   Plan   Treatment Interventions ADL retraining;Functional transfer training;Endurance training;Patient/family training;Equipment evaluation/education;Compensatory technique education;Continued evaluation;Activityengagement  (ACLS)   Goal Expiration Date 02/24/25   OT Treatment Day 0   OT Frequency 2-3x/wk   Discharge Recommendation   Recommendation Geriatric Consult  (ambulatory referral if not already established)   Rehab Resource Intensity Level, OT (S)  III (Minimum Resource Intensity)  (if patient to be left alone at home, recommend ACLS for formal safety/supervision recommendations)   Additional Comments  The patient's raw score on the -PAC Daily Activity Inpatient Short Form is 19. A raw score of greater than or equal to 19 suggests the patient may benefit from discharge to home. Please refer to the recommendation of the Occupational Therapist for safe discharge planning.   AM-PAC Daily Activity Inpatient   Lower Body Dressing 3   Bathing 3   Toileting 3   Upper Body Dressing 3   Grooming 3   Eating 4   Daily Activity Raw Score 19   Daily Activity Standardized Score (Calc for Raw Score >=11) 40.22   AM-PAC Applied Cognition Inpatient   Following a Speech/Presentation 2   Understanding Ordinary Conversation 3   Taking Medications 2   Remembering Where Things Are Placed or Put Away 3   Remembering List of 4-5 Errands 3   Taking Care of Complicated Tasks 2   Applied Cognition Raw Score 15   Applied Cognition Standardized Score 33.54   End of Consult   Education Provided Yes   Patient Position at End of Consult Bed/Chair alarm activated;All needs within reach;Bedside chair   Nurse Communication Nurse aware of consult   Goals established on initial evaluation in order to achieve pt's goal of  returning home      Pt will complete UB ADLs Mod independent   for increased ADL independence within 10 days.     Pt will complete LB ADLs Mod independent   for increased ADL independence within 10 days.     Pt will complete toileting Mod independent   with use of DME for increased ADL independence within 10 days.     Pt will demonstrate proper body mechanics to complete self-care transfers and functional mobility with Mod independent  and use of LRAD for increased safety and functional independence within 10 days.     Pt will demonstrate standing tolerance of 6 min for increased activity tolerance during ADL/IADL tasks within 10 days.     Pt will demonstrate proper body mechanics and fall prevention strategies during 100% of tx sessions for increased safety awareness during ADL/IADLs    Pt will demonstrate activity tolerance of 30 min in therapeutic tasks for increased participation in meaningful activities upon D/C.    Pt will participate in ongoing cognitive assessments to assist with safe D/C planning and supervision/assistance recommendations. (ACLS)    Pt will demonstrate OOB sitting tolerance of 2-4 hr/day for increased activity tolerance and engagement in leisure activities within 10 days.     Pt will dispense simulated medications into weekly pill box with 100% dosing accuracy and 0 VC's for improved safety and independence during medication management upon D/C       Pt benefited from co-session of skilled OT and PT therapists in order to most appropriately address functional deficits d/t decreased activity tolerance.  OT/PT objectives were addressed separately; please see PT note for specific goal areas targeted.    Taylor Whatley, OT

## 2025-02-14 NOTE — PLAN OF CARE
Problem: PAIN - ADULT  Goal: Verbalizes/displays adequate comfort level or baseline comfort level  Description: Interventions:  - Encourage patient to monitor pain and request assistance  - Assess pain using appropriate pain scale  - Administer analgesics based on type and severity of pain and evaluate response  - Implement non-pharmacological measures as appropriate and evaluate response  - Consider cultural and social influences on pain and pain management  - Notify physician/advanced practitioner if interventions unsuccessful or patient reports new pain  Outcome: Progressing     Problem: INFECTION - ADULT  Goal: Absence or prevention of progression during hospitalization  Description: INTERVENTIONS:  - Assess and monitor for signs and symptoms of infection  - Monitor lab/diagnostic results  - Monitor all insertion sites, i.e. indwelling lines, tubes, and drains  - Monitor endotracheal if appropriate and nasal secretions for changes in amount and color  - [place] appropriate cooling/warming therapies per order  - Administer medications as ordered  - Instruct and encourage patient and family to use good hand hygiene technique  - Identify and instruct in appropriate isolation precautions for identified infection/condition  Outcome: Progressing     Problem: INFECTION - ADULT  Goal: Absence of fever/infection during neutropenic period  Description: INTERVENTIONS:  - Monitor WBC    Outcome: Progressing     Problem: SAFETY ADULT  Goal: Patient will remain free of falls  Description: INTERVENTIONS:  - Educate patient/family on patient safety including physical limitations  - Instruct patient to call for assistance with activity   - Consult OT/PT to assist with strengthening/mobility   - Keep Call bell within reach  - Keep bed low and locked with side rails adjusted as appropriate  - Keep care items and personal belongings within reach  - Initiate and maintain comfort rounds  - Make Fall Risk Sign visible to staff  -  Offer Toileting every  Hours, in advance of need  - Initiate/Maintain alarm  - Obtain necessary fall risk management equipment:   - Apply yellow socks and bracelet for high fall risk patients  - Consider moving patient to room near nurses station  Outcome: Progressing

## 2025-02-14 NOTE — PROGRESS NOTES
Progress Note - Hospitalist   Name: Yajaira Marsh 79 y.o. female I MRN: [id number]  Unit/Bed#: W -01 I Date of Admission: 2/11/2025   Date of Service: 2/14/2025 I Hospital Day: 3    Assessment & Plan  Community acquired pneumonia  Acute cough starting approximately 1 week prior to presentation, nonproductive  Tachypneic on exam, satting well on room air  Patient meeting sepsis criteria for transient tachycardia (101), tachypnea to 28, and leukocytosis (17.82), though tachycardia rapidly resolved.   Labs notable for leukocytosis and elevated Procal of 2.56, CRP elevated at 201.1  Repeat Pro-Tee at 1.59 from 2.56  Lactic acid within normal limits  CT chest notable for scattered groundglass opacities and tree-in-bud opacities within both lower lobes and most significant in the right middle lobe, suspicious for atypical infection.  Meeting criteria for severe community-acquired pneumonia on the basis of 3 minor criteria: Multilobar opacities, confusion/disorientation, BUN greater than 20  Drip score 0  Blood culture no growth at 48 hours   Per speech and language pathologist recommends to consider VBS to assess for aspiration risk giving patient has mild oropharyngeal dysphagia.  At this time recommend patient's diet-regular diet with thin liquid  VBS -unremarkable.  Continue regular diet with thin liquid.  No straws  Received ceftriaxone and azithromycin in the ED     Plan:  Continue ceftriaxone 1 g every 24 hours (4/5 days)  Continue azithromycin 500 mg every 24 hours (4/5 days)  Patient is not requiring oxygen but 94% on room  Isolyte 75 cc/h for 12 hours for orthostatic blood pressure  Toxic metabolic encephalopathy  Patient acutely altered starting night prior to presentation  Suspect acute toxic metabolic encephalopathy secondary to acute infectious process  Treat community-acquired pneumonia as above  Avoid sedating medications as able  Monitor mentation  Type 2 diabetes mellitus with complication, with  long-term current use of insulin ([facility])  Lab Results   Component Value Date    HGBA1C 12.4 (A) 02/10/2025     Recent Labs     02/13/25  1104 02/13/25  1542 02/13/25  2109 02/14/25  0708   POCGLU 206* 164* 107 156*   Blood Sugar Average: Last 72 hrs:  (P) 226.8    Recent A1c of 12.4 indicates poorly controlled type 2 diabetes  Patient's  reports sporadic compliance with home insulin regimen  Home regimen insulin aspart 18 units twice daily with meals and Toujeo 62 units QAM  Due to noncompliance, unclear what patient's true insulin requirements are, as home regimen is significantly more than would typically be required for somebody of patient's weight  Suspect hyperglycemia at presentation is multifactorial due to poorly controlled type 2 diabetes mellitus and acute infection     Plan:  Continue Lantus at 24 units nightly   If blood glucose continues to be elevated, will consider starting Lantus 30 unit nightly.  Increase lispro to 9 units from 7 units 3 times daily with meals  Start sliding scale insulin  Avoid hypoglycemia per protocol  Monitor blood glucose and insulin requirements and adjust regimen as indicated  Fall  Unclear mechanism, but occurred overnight getting up from bed in setting of acute infectious process  Patient unsure whether she has had any recent medication changes, longstanding prescription for Xanax as needed  Per patient's spouse, patient walks without assistive device at baseline  Orthostatic blood pressure positive, repeat orthostatic blood pressure today is positive    Plan:  Isolyte 75 cc/h for 12 hours for orthostatic blood pressure  Thigh-high compression stocking  PT/OT eval and treat  Restart as-needed Xanax as needed given patient is A&Ox3 this morning  Can discuss trial weaning of benzodiazepines outpatient prior discharge    Stage 3b chronic kidney disease ([facility])  Lab Results   Component Value Date    EGFR 39 02/14/2025    EGFR 45 02/13/2025    EGFR 39 02/12/2025    CREATININE  1.30 02/14/2025    CREATININE 1.15 02/13/2025    CREATININE 1.29 02/12/2025   Baseline creatinine appears to be 1.5-1.7  Avoid nephrotoxins  Monitor renal indices on BMP    VTE Pharmacologic Prophylaxis: VTE Score: 8 High Risk (Score >/= 5) - Pharmacological DVT Prophylaxis Ordered: enoxaparin (Lovenox). Sequential Compression Devices Ordered.    Mobility:   Basic Mobility Inpatient Raw Score: 18  JH-HLM Goal: 6: Walk 10 steps or more  JH-HLM Achieved: 4: Move to chair/commode  JH-HLM Goal NOT achieved. Continue with multidisciplinary rounding and encourage appropriate mobility to improve upon JH-HLM goals.    Patient Centered Rounds: I performed bedside rounds with nursing staff today.   Discussions with Specialists or Other Care Team Provider: Speech and language pathologist    Education and Discussions with Family / Patient: Updated  (daughter) via phone.    Current Length of Stay: 3 day(s)  Current Patient Status: Inpatient   Certification Statement: The patient will continue to require additional inpatient hospital stay due to community-acquired pneumonia  Discharge Plan: Anticipate discharge in 24-48 hrs to discharge location to be determined pending rehab evaluations.    Code Status: Level 1 - Full Code    Subjective   Patient is seen and examined by me at bedside.  Patient is ANO x 2 to self and time. Patient is very calm. Patient reports that she has hard time falling asleep at new place..  Other than the patient has no issues or significant event overnight.  Patient is breathing okay, denies shortness of breath and on room air satting 95%.  Patient is tolerating oral diet.  Patient is denying chest pain, shortness of breath, abdominal pain, fever, chill, changes with urination or bowel movement.    Objective :  Temp:  [98.1 °F (36.7 °C)-98.9 °F (37.2 °C)] 98.9 °F (37.2 °C)  HR:  [75-91] 91  BP: ()/(56-60) 102/60  Resp:  [16-18] 16  SpO2:  [94 %-96 %] 94 %  O2 Device: None (Room  air)    Body mass index is 35.04 kg/m².     Input and Output Summary (last 24 hours):     Intake/Output Summary (Last 24 hours) at 2/14/2025 0827  Last data filed at 2/13/2025 1700  Gross per 24 hour   Intake 360 ml   Output --   Net 360 ml       Physical Exam  Vitals reviewed.   Constitutional:       Appearance: Normal appearance.   Eyes:      General: No scleral icterus.        Right eye: No discharge.         Left eye: No discharge.   Cardiovascular:      Rate and Rhythm: Normal rate and regular rhythm.      Pulses: Normal pulses.      Heart sounds: Normal heart sounds. No murmur heard.     No friction rub.   Pulmonary:      Effort: Pulmonary effort is normal. No respiratory distress.      Breath sounds: Normal breath sounds. No stridor. No wheezing or rales.   Chest:      Chest wall: No tenderness.   Abdominal:      General: Bowel sounds are normal. There is no distension.      Palpations: Abdomen is soft.      Tenderness: There is no abdominal tenderness. There is no guarding.   Musculoskeletal:         General: Normal range of motion.      Cervical back: Normal range of motion.      Right lower leg: No edema.      Left lower leg: No edema.   Skin:     General: Skin is warm.      Capillary Refill: Capillary refill takes less than 2 seconds.      Coloration: Skin is not jaundiced or pale.   Neurological:      General: No focal deficit present.      Mental Status: She is alert and oriented to person, place, and time. Mental status is at baseline.   Psychiatric:         Mood and Affect: Mood normal.         Behavior: Behavior normal.         Thought Content: Thought content normal.         Judgment: Judgment normal.         Lines/Drains:              Lab Results: I have reviewed the following results:   Results from last 7 days   Lab Units 02/14/25  0630 02/12/25  0550 02/11/25  0531   WBC Thousand/uL 10.96*   < > 17.82*   HEMOGLOBIN g/dL 9.8*   < > 10.5*   HEMATOCRIT % 30.0*   < > 32.2*   PLATELETS Thousands/uL  217   < > 197   BANDS PCT %  --   --  7   SEGS PCT % 76*   < >  --    LYMPHO PCT % 14   < > 9*   MONO PCT % 8   < > 2*   EOS PCT % 1   < > 0    < > = values in this interval not displayed.     Results from last 7 days   Lab Units 02/14/25  0630 02/11/25  1212 02/11/25  0531   SODIUM mmol/L 134*   < > 134*   POTASSIUM mmol/L 4.1   < > 5.5*   CHLORIDE mmol/L 104   < > 103   CO2 mmol/L 23   < > 21   BUN mg/dL 14   < > 31*   CREATININE mg/dL 1.30   < > 1.65*   ANION GAP mmol/L 7   < > 10   CALCIUM mg/dL 8.8   < > 9.2   ALBUMIN g/dL  --   --  3.8   TOTAL BILIRUBIN mg/dL  --   --  0.63   ALK PHOS U/L  --   --  102   ALT U/L  --   --  18   AST U/L  --   --  18   GLUCOSE RANDOM mg/dL 149*   < > 469*    < > = values in this interval not displayed.     Results from last 7 days   Lab Units 02/11/25  0656   INR  1.12     Results from last 7 days   Lab Units 02/14/25  0708 02/13/25  2109 02/13/25  1542 02/13/25  1104 02/13/25  0709 02/12/25  2107 02/12/25  1616 02/12/25  1118 02/12/25  0905 02/11/25  2152 02/11/25  1729 02/11/25  1257   POC GLUCOSE mg/dl 156* 107 164* 206* 167* 99 220* 229* 168* 157* 246* 277*     Results from last 7 days   Lab Units 02/10/25  0932   HEMOGLOBIN A1C  12.4*     Results from last 7 days   Lab Units 02/13/25  0000 02/12/25  0550 02/11/25  0656 02/11/25  0531   LACTIC ACID mmol/L  --   --  2.0  --    PROCALCITONIN ng/ml 1.59* 2.92*  --  2.56*       Recent Cultures (last 7 days):   Results from last 7 days   Lab Units 02/11/25  0656 02/11/25  0548   BLOOD CULTURE  No Growth at 48 hrs. No Growth at 48 hrs.       Imaging Results Review: I reviewed radiology reports from this admission including: CT chest.  Other Study Results Review: No additional pertinent studies reviewed.    Last 24 Hours Medication List:     Current Facility-Administered Medications:     acetaminophen (TYLENOL) tablet 650 mg, Q6H PRN    ALPRAZolam (XANAX) tablet 0.5 mg, BID PRN    azithromycin (ZITHROMAX) tablet 500 mg, Q24H     benzonatate (TESSALON PERLES) capsule 100 mg, TID PRN    ceftriaxone (ROCEPHIN) 1 g/50 mL in dextrose IVPB, Q24H, Last Rate: 1,000 mg (02/13/25 0833) **AND** [DISCONTINUED] azithromycin (ZITHROMAX) 500 mg in sodium chloride 0.9% 250mL IVPB 500 mg, Q24H, Last Rate: Stopped (02/13/25 1132)    diltiazem (CARDIZEM CD) 24 hr capsule 300 mg, Daily    enoxaparin (LOVENOX) subcutaneous injection 40 mg, Q24H [place]    fluticasone (FLONASE) 50 mcg/act nasal spray 2 spray, Daily    guaiFENesin (MUCINEX) 12 hr tablet 1,200 mg, BID    insulin glargine (LANTUS) subcutaneous injection 24 Units 0.24 mL, HS    insulin lispro (HumALOG/ADMELOG) 100 units/mL subcutaneous injection 1-5 Units, HS    insulin lispro (HumALOG/ADMELOG) 100 units/mL subcutaneous injection 1-6 Units, TID AC **AND** Fingerstick Glucose (POCT), TID AC    insulin lispro (HumALOG/ADMELOG) 100 units/mL subcutaneous injection 7 Units, TID With Meals    isosorbide mononitrate (IMDUR) 24 hr tablet 60 mg, Daily    labetalol (NORMODYNE) injection 10 mg, Q6H PRN    levothyroxine tablet 75 mcg, Early Morning    losartan (COZAAR) tablet 100 mg, Daily    magnesium sulfate 2 g/50 mL IVPB (premix) 2 g, Once    melatonin tablet 3 mg, HS PRN    nystatin (MYCOSTATIN) cream, BID    ondansetron (ZOFRAN) injection 4 mg, Q6H PRN    pravastatin (PRAVACHOL) tablet 40 mg, Daily With Dinner    senna (SENOKOT) tablet 8.6 mg, Daily    venlafaxine (EFFEXOR) tablet 75 mg, Daily    Administrative Statements   Today, Patient Was Seen By: Viry Carlos MD      **Please Note: This note may have been constructed using a voice recognition system.**

## 2025-02-14 NOTE — ASSESSMENT & PLAN NOTE
Acute cough starting approximately 1 week prior to presentation, nonproductive  Tachypneic on exam, satting well on room air  Patient meeting sepsis criteria for transient tachycardia (101), tachypnea to 28, and leukocytosis (17.82), though tachycardia rapidly resolved.   Labs notable for leukocytosis and elevated Procal of 2.56, CRP elevated at 201.1  Repeat Pro-Tee at 1.59 from 2.56  Lactic acid within normal limits  CT chest notable for scattered groundglass opacities and tree-in-bud opacities within both lower lobes and most significant in the right middle lobe, suspicious for atypical infection.  Meeting criteria for severe community-acquired pneumonia on the basis of 3 minor criteria: Multilobar opacities, confusion/disorientation, BUN greater than 20  Drip score 0  Blood culture no growth at 48 hours   Per speech and language pathologist recommends to consider VBS to assess for aspiration risk giving patient has mild oropharyngeal dysphagia.  At this time recommend patient's diet-regular diet with thin liquid  VBS -unremarkable.  Continue regular diet with thin liquid.  No straws  Received ceftriaxone and azithromycin in the ED     Plan:  Continue ceftriaxone 1 g every 24 hours (4/5 days)  Continue azithromycin 500 mg every 24 hours (4/5 days)  Patient is not requiring oxygen but 94% on room  Isolyte 75 cc/h for 12 hours for orthostatic blood pressure

## 2025-02-14 NOTE — PLAN OF CARE
Problem: PAIN - ADULT  Goal: Verbalizes/displays adequate comfort level or baseline comfort level  Description: Interventions:  - Encourage patient to monitor pain and request assistance  - Assess pain using appropriate pain scale  - Administer analgesics based on type and severity of pain and evaluate response  - Implement non-pharmacological measures as appropriate and evaluate response  - Consider cultural and social influences on pain and pain management  - Notify physician/advanced practitioner if interventions unsuccessful or patient reports new pain  Outcome: Progressing     Problem: INFECTION - ADULT  Goal: Absence or prevention of progression during hospitalization  Description: INTERVENTIONS:  - Assess and monitor for signs and symptoms of infection  - Monitor lab/diagnostic results  - Monitor all insertion sites, i.e. indwelling lines, tubes, and drains  - Monitor endotracheal if appropriate and nasal secretions for changes in amount and color  - [employer] appropriate cooling/warming therapies per order  - Administer medications as ordered  - Instruct and encourage patient and family to use good hand hygiene technique  - Identify and instruct in appropriate isolation precautions for identified infection/condition  Outcome: Progressing     Problem: SAFETY ADULT  Goal: Patient will remain free of falls  Description: INTERVENTIONS:  - Educate patient/family on patient safety including physical limitations  - Instruct patient to call for assistance with activity   - Consult OT/PT to assist with strengthening/mobility   - Keep Call bell within reach  - Keep bed low and locked with side rails adjusted as appropriate  - Keep care items and personal belongings within reach  - Initiate and maintain comfort rounds  - Make Fall Risk Sign visible to staff  - Offer Toileting every 2 Hours, in advance of need  - Initiate/Maintain bed/chair alarm    Problem: DISCHARGE PLANNING  Goal: Discharge to home or other  facility with appropriate resources  Description: INTERVENTIONS:  - Identify barriers to discharge w/patient and caregiver  - Arrange for needed discharge resources and transportation as appropriate  - Identify discharge learning needs (meds, wound care, etc.)  - Arrange for interpretive services to assist at discharge as needed  - Refer to Case Management Department for coordinating discharge planning if the patient needs post-hospital services based on physician/advanced practitioner order or complex needs related to functional status, cognitive ability, or social support system  Outcome: Progressing   - Apply yellow socks and bracelet for high fall risk patients  - Consider moving patient to room near nurses station  Outcome: Progressing

## 2025-02-14 NOTE — ASSESSMENT & PLAN NOTE
Lab Results   Component Value Date    EGFR 39 02/14/2025    EGFR 45 02/13/2025    EGFR 39 02/12/2025    CREATININE 1.30 02/14/2025    CREATININE 1.15 02/13/2025    CREATININE 1.29 02/12/2025   Baseline creatinine appears to be 1.5-1.7  Avoid nephrotoxins  Monitor renal indices on BMP

## 2025-02-15 VITALS
WEIGHT: 190.26 LBS | RESPIRATION RATE: 15 BRPM | BODY MASS INDEX: 35.01 KG/M2 | HEIGHT: 62 IN | SYSTOLIC BLOOD PRESSURE: 140 MMHG | TEMPERATURE: 98.7 F | HEART RATE: 86 BPM | OXYGEN SATURATION: 93 % | DIASTOLIC BLOOD PRESSURE: 69 MMHG

## 2025-02-15 PROBLEM — G92.8 TOXIC METABOLIC ENCEPHALOPATHY: Status: RESOLVED | Noted: 2025-02-11 | Resolved: 2025-02-15

## 2025-02-15 LAB
ANION GAP SERPL CALCULATED.3IONS-SCNC: 9 MMOL/L (ref 4–13)
BASOPHILS # BLD AUTO: 0.03 THOUSANDS/ΜL (ref 0–0.1)
BASOPHILS NFR BLD AUTO: 0 % (ref 0–1)
BUN SERPL-MCNC: 13 MG/DL (ref 5–25)
CALCIUM SERPL-MCNC: 8.7 MG/DL (ref 8.4–10.2)
CHLORIDE SERPL-SCNC: 104 MMOL/L (ref 96–108)
CO2 SERPL-SCNC: 22 MMOL/L (ref 21–32)
CREAT SERPL-MCNC: 1.23 MG/DL (ref 0.6–1.3)
EOSINOPHIL # BLD AUTO: 0.1 THOUSAND/ΜL (ref 0–0.61)
EOSINOPHIL NFR BLD AUTO: 1 % (ref 0–6)
ERYTHROCYTE [DISTWIDTH] IN BLOOD BY AUTOMATED COUNT: 14.6 % (ref 11.6–15.1)
GFR SERPL CREATININE-BSD FRML MDRD: 42 ML/MIN/1.73SQ M
GLUCOSE SERPL-MCNC: 107 MG/DL (ref 65–140)
GLUCOSE SERPL-MCNC: 126 MG/DL (ref 65–140)
GLUCOSE SERPL-MCNC: 198 MG/DL (ref 65–140)
HCT VFR BLD AUTO: 28.8 % (ref 34.8–46.1)
HGB BLD-MCNC: 9.4 G/DL (ref 11.5–15.4)
IMM GRANULOCYTES # BLD AUTO: 0.16 THOUSAND/UL (ref 0–0.2)
IMM GRANULOCYTES NFR BLD AUTO: 2 % (ref 0–2)
LYMPHOCYTES # BLD AUTO: 1.5 THOUSANDS/ΜL (ref 0.6–4.47)
LYMPHOCYTES NFR BLD AUTO: 17 % (ref 14–44)
MAGNESIUM SERPL-MCNC: 2 MG/DL (ref 1.9–2.7)
MCH RBC QN AUTO: 27.8 PG (ref 26.8–34.3)
MCHC RBC AUTO-ENTMCNC: 32.6 G/DL (ref 31.4–37.4)
MCV RBC AUTO: 85 FL (ref 82–98)
MONOCYTES # BLD AUTO: 0.94 THOUSAND/ΜL (ref 0.17–1.22)
MONOCYTES NFR BLD AUTO: 11 % (ref 4–12)
NEUTROPHILS # BLD AUTO: 6.2 THOUSANDS/ΜL (ref 1.85–7.62)
NEUTS SEG NFR BLD AUTO: 69 % (ref 43–75)
NRBC BLD AUTO-RTO: 0 /100 WBCS
PLATELET # BLD AUTO: 234 THOUSANDS/UL (ref 149–390)
PMV BLD AUTO: 10.7 FL (ref 8.9–12.7)
POTASSIUM SERPL-SCNC: 4 MMOL/L (ref 3.5–5.3)
RBC # BLD AUTO: 3.38 MILLION/UL (ref 3.81–5.12)
SODIUM SERPL-SCNC: 135 MMOL/L (ref 135–147)
WBC # BLD AUTO: 8.93 THOUSAND/UL (ref 4.31–10.16)

## 2025-02-15 PROCEDURE — 80048 BASIC METABOLIC PNL TOTAL CA: CPT

## 2025-02-15 PROCEDURE — 85025 COMPLETE CBC W/AUTO DIFF WBC: CPT

## 2025-02-15 PROCEDURE — 83735 ASSAY OF MAGNESIUM: CPT

## 2025-02-15 PROCEDURE — 82948 REAGENT STRIP/BLOOD GLUCOSE: CPT

## 2025-02-15 PROCEDURE — 99239 HOSP IP/OBS DSCHRG MGMT >30: CPT | Performed by: INTERNAL MEDICINE

## 2025-02-15 RX ORDER — SODIUM CHLORIDE, SODIUM GLUCONATE, SODIUM ACETATE, POTASSIUM CHLORIDE, MAGNESIUM CHLORIDE, SODIUM PHOSPHATE, DIBASIC, AND POTASSIUM PHOSPHATE .53; .5; .37; .037; .03; .012; .00082 G/100ML; G/100ML; G/100ML; G/100ML; G/100ML; G/100ML; G/100ML
75 INJECTION, SOLUTION INTRAVENOUS CONTINUOUS
Status: DISCONTINUED | OUTPATIENT
Start: 2025-02-15 | End: 2025-02-15

## 2025-02-15 RX ADMIN — ENOXAPARIN SODIUM 40 MG: 40 INJECTION SUBCUTANEOUS at 08:53

## 2025-02-15 RX ADMIN — INSULIN LISPRO 9 UNITS: 100 INJECTION, SOLUTION INTRAVENOUS; SUBCUTANEOUS at 08:29

## 2025-02-15 RX ADMIN — INSULIN LISPRO 2 UNITS: 100 INJECTION, SOLUTION INTRAVENOUS; SUBCUTANEOUS at 11:54

## 2025-02-15 RX ADMIN — ISOSORBIDE MONONITRATE 60 MG: 60 TABLET, EXTENDED RELEASE ORAL at 08:22

## 2025-02-15 RX ADMIN — DILTIAZEM HYDROCHLORIDE 300 MG: 180 CAPSULE, COATED, EXTENDED RELEASE ORAL at 08:22

## 2025-02-15 RX ADMIN — SENNOSIDES 8.6 MG: 8.6 TABLET ORAL at 08:22

## 2025-02-15 RX ADMIN — VENLAFAXINE 75 MG: 37.5 TABLET ORAL at 08:22

## 2025-02-15 RX ADMIN — NYSTATIN CREAM: 100000 CREAM TOPICAL at 08:32

## 2025-02-15 RX ADMIN — CEFTRIAXONE SODIUM 1000 MG: 10 INJECTION, POWDER, FOR SOLUTION INTRAVENOUS at 08:54

## 2025-02-15 RX ADMIN — LOSARTAN POTASSIUM 100 MG: 50 TABLET, FILM COATED ORAL at 08:22

## 2025-02-15 RX ADMIN — INSULIN LISPRO 9 UNITS: 100 INJECTION, SOLUTION INTRAVENOUS; SUBCUTANEOUS at 11:55

## 2025-02-15 RX ADMIN — FLUTICASONE PROPIONATE 2 SPRAY: 50 SPRAY, METERED NASAL at 08:25

## 2025-02-15 RX ADMIN — GUAIFENESIN 1200 MG: 600 TABLET, EXTENDED RELEASE ORAL at 08:22

## 2025-02-15 RX ADMIN — AZITHROMYCIN DIHYDRATE 500 MG: 250 TABLET, FILM COATED ORAL at 08:22

## 2025-02-15 RX ADMIN — LEVOTHYROXINE SODIUM 75 MCG: 75 TABLET ORAL at 05:53

## 2025-02-15 NOTE — ASSESSMENT & PLAN NOTE
Lab Results   Component Value Date    HGBA1C 12.4 (A) 02/10/2025     Recent Labs     02/14/25  0708 02/14/25  1101 02/14/25  1626 02/14/25 2059   POCGLU 156* 227* 94 151*   Blood Sugar Average: Last 72 hrs:  (P) 165.9131429125287283    Recent A1c of 12.4 indicates poorly controlled type 2 diabetes  Patient's  reports sporadic compliance with home insulin regimen  Home regimen insulin aspart 18 units twice daily with meals and Toujeo 62 units QAM  Due to noncompliance, unclear what patient's true insulin requirements are, as home regimen is significantly more than would typically be required for somebody of patient's weight  Suspect hyperglycemia at presentation is multifactorial due to poorly controlled type 2 diabetes mellitus and acute infection     Plan:  Continue Lantus at 24 units nightly   If blood glucose continues to be elevated, will consider starting Lantus 30 unit nightly.  Increase lispro to 9 units from 7 units 3 times daily with meals  Start sliding scale insulin

## 2025-02-15 NOTE — PLAN OF CARE
Problem: PAIN - ADULT  Goal: Verbalizes/displays adequate comfort level or baseline comfort level  Description: Interventions:  - Encourage patient to monitor pain and request assistance  - Assess pain using appropriate pain scale  - Administer analgesics based on type and severity of pain and evaluate response  - Implement non-pharmacological measures as appropriate and evaluate response  - Consider cultural and social influences on pain and pain management  - Notify physician/advanced practitioner if interventions unsuccessful or patient reports new pain  Outcome: Progressing     Problem: INFECTION - ADULT  Goal: Absence or prevention of progression during hospitalization  Description: INTERVENTIONS:  - Assess and monitor for signs and symptoms of infection  - Monitor lab/diagnostic results  - Monitor all insertion sites, i.e. indwelling lines, tubes, and drains  - Monitor endotracheal if appropriate and nasal secretions for changes in amount and color  - [place] appropriate cooling/warming therapies per order  - Administer medications as ordered  - Instruct and encourage patient and family to use good hand hygiene technique  - Identify and instruct in appropriate isolation precautions for identified infection/condition  Outcome: Progressing  Goal: Absence of fever/infection during neutropenic period  Description: INTERVENTIONS:  - Monitor WBC    Outcome: Progressing     Problem: SAFETY ADULT  Goal: Patient will remain free of falls  Description: INTERVENTIONS:  - Educate patient/family on patient safety including physical limitations  - Instruct patient to call for assistance with activity   - Consult OT/PT to assist with strengthening/mobility   - Keep Call bell within reach  - Keep bed low and locked with side rails adjusted as appropriate  - Keep care items and personal belongings within reach  - Initiate and maintain comfort rounds  - Make Fall Risk Sign visible to staff  - Offer Toileting every  Hours,  in advance of need  - Initiate/Maintain alarm  - Obtain necessary fall risk management equipment:   - Apply yellow socks and bracelet for high fall risk patients  - Consider moving patient to room near nurses station  Outcome: Progressing  Goal: Maintain or return to baseline ADL function  Description: INTERVENTIONS:  -  Assess patient's ability to carry out ADLs; assess patient's baseline for ADL function and identify physical deficits which impact ability to perform ADLs (bathing, care of mouth/teeth, toileting, grooming, dressing, etc.)  - Assess/evaluate cause of self-care deficits   - Assess range of motion  - Assess patient's mobility; develop plan if impaired  - Assess patient's need for assistive devices and provide as appropriate  - Encourage maximum independence but intervene and supervise when necessary  - Involve family in performance of ADLs  - Assess for home care needs following discharge   - Consider OT consult to assist with ADL evaluation and planning for discharge  - Provide patient education as appropriate  Outcome: Progressing  Goal: Maintains/Returns to pre admission functional level  Description: INTERVENTIONS:  - Perform AM-PAC 6 Click Basic Mobility/ Daily Activity assessment daily.  - Set and communicate daily mobility goal to care team and patient/family/caregiver.   - Collaborate with rehabilitation services on mobility goals if consulted  - Perform Range of Motion  times a day.  - Reposition patient every  hours.  - Dangle patient  times a day  - Stand patient  times a day  - Ambulate patient  times a day  - Out of bed to chair times a day   - Out of bed for meals  times a day  - Out of bed for toileting  - Record patient progress and toleration of activity level   Outcome: Progressing     Problem: DISCHARGE PLANNING  Goal: Discharge to home or other facility with appropriate resources  Description: INTERVENTIONS:  - Identify barriers to discharge w/patient and caregiver  - Arrange for  needed discharge resources and transportation as appropriate  - Identify discharge learning needs (meds, wound care, etc.)  - Arrange for interpretive services to assist at discharge as needed  - Refer to Case Management Department for coordinating discharge planning if the patient needs post-hospital services based on physician/advanced practitioner order or complex needs related to functional status, cognitive ability, or social support system  Outcome: Progressing     Problem: Knowledge Deficit  Goal: Patient/family/caregiver demonstrates understanding of disease process, treatment plan, medications, and discharge instructions  Description: Complete learning assessment and assess knowledge base.  Interventions:  - Provide teaching at level of understanding  - Provide teaching via preferred learning methods  Outcome: Progressing     Problem: Nutrition/Hydration-ADULT  Goal: Nutrient/Hydration intake appropriate for improving, restoring or maintaining nutritional needs  Description: Monitor and assess patient's nutrition/hydration status for malnutrition. Collaborate with interdisciplinary team and initiate plan and interventions as ordered.  Monitor patient's weight and dietary intake as ordered or per policy. Utilize nutrition screening tool and intervene as necessary. Determine patient's food preferences and provide high-protein, high-caloric foods as appropriate.     INTERVENTIONS:  - Monitor oral intake, urinary output, labs, and treatment plans  - Assess nutrition and hydration status and recommend course of action  - Evaluate amount of meals eaten  - Assist patient with eating if necessary   - Allow adequate time for meals  - Recommend/ encourage appropriate diets, oral nutritional supplements, and vitamin/mineral supplements  - Order, calculate, and assess calorie counts as needed  - Recommend, monitor, and adjust tube feedings and TPN/PPN based on assessed needs  - Assess need for intravenous fluids  -  Provide specific nutrition/hydration education as appropriate  - Include patient/family/caregiver in decisions related to nutrition  Outcome: Progressing

## 2025-02-15 NOTE — DISCHARGE INSTR - AVS FIRST PAGE
Dear Yajaira Marsh,     It was our pleasure to care for you here at [facility].  It is our hope that we were always able to exceed the expected standards for your care during your stay.  You were hospitalized due to pneumonia and fall.  You were cared for on the [address] floor by Tatianna Lopez MD under the service of Indiana Sanderson, * with the [facility] who covers for your primary care physician (PCP), Sukumar Clemens DO, while you were hospitalized.  If you have any questions or concerns related to this hospitalization, you may contact us at .  For follow up as well as any medication refills, we recommend that you follow up with your primary care physician.  A registered nurse will reach out to you by phone within a few days after your discharge to answer any additional questions that you may have after going home.  However, at this time we provide for you here, the most important instructions / recommendations at discharge:     Notable Medication Adjustments -   None  Testing Required after Discharge -   None from hospital medicine standpoint  Important follow up information -   Please follow-up with your primary care provider within 1 week of discharge.  Other Instructions -   Please be compliant with your medications.  If new symptoms happen or current symptoms worsen, call 911 or come to ED.  Please review this entire after visit summary as additional general instructions including medication list, appointments, activity, diet, any pertinent wound care, and other additional recommendations from your care team that may be provided for you.      Sincerely,     Tatianna Lopez MD

## 2025-02-15 NOTE — DISCHARGE SUMMARY
Discharge Summary - Hospitalist   Name: Yajaira Marsh 79 y.o. female I MRN: [id number]  Unit/Bed#: W -01 I Date of Admission: 2/11/2025   Date of Service: 2/15/2025 I Hospital Day: 4     Assessment & Plan  Community acquired pneumonia  Acute cough starting approximately 1 week prior to presentation, nonproductive  Tachypneic on exam, satting well on room air  Patient meeting sepsis criteria for transient tachycardia (101), tachypnea to 28, and leukocytosis (17.82), though tachycardia rapidly resolved.   Labs notable for leukocytosis and elevated Procal of 2.56, CRP elevated at 201.1  Repeat Pro-Tee at 1.59 from 2.56  Lactic acid within normal limits  CT chest notable for scattered groundglass opacities and tree-in-bud opacities within both lower lobes and most significant in the right middle lobe, suspicious for atypical infection.  Meeting criteria for severe community-acquired pneumonia on the basis of 3 minor criteria: Multilobar opacities, confusion/disorientation, BUN greater than 20  Drip score 0  Blood culture no growth at 48 hours   Per speech and language pathologist recommends to consider VBS to assess for aspiration risk giving patient has mild oropharyngeal dysphagia.  At this time recommend patient's diet-regular diet with thin liquid  VBS -unremarkable.  Continue regular diet with thin liquid.  No straws  Received ceftriaxone and azithromycin in the ED     Plan:  Continue ceftriaxone 1 g every 24 hours (4/5 days)  Continue azithromycin 500 mg every 24 hours (4/5 days)  Patient is not requiring oxygen but 94% on room  Isolyte 75 cc/h for 12 hours for orthostatic blood pressure  Toxic metabolic encephalopathy (Resolved: 2/15/2025)  Patient acutely altered starting night prior to presentation  Suspect acute toxic metabolic encephalopathy secondary to acute infectious process  Treat community-acquired pneumonia as above  Avoid sedating medications as able  Monitor mentation  Type 2 diabetes  mellitus with complication, with long-term current use of insulin ([facility])  Lab Results   Component Value Date    HGBA1C 12.4 (A) 02/10/2025     Recent Labs     02/14/25  0708 02/14/25  1101 02/14/25  1626 02/14/25 2059   POCGLU 156* 227* 94 151*   Blood Sugar Average: Last 72 hrs:  (P) [id number]    Recent A1c of 12.4 indicates poorly controlled type 2 diabetes  Patient's  reports sporadic compliance with home insulin regimen  Home regimen insulin aspart 18 units twice daily with meals and Toujeo 62 units QAM  Due to noncompliance, unclear what patient's true insulin requirements are, as home regimen is significantly more than would typically be required for somebody of patient's weight  Suspect hyperglycemia at presentation is multifactorial due to poorly controlled type 2 diabetes mellitus and acute infection     Plan:  Continue Lantus at 24 units nightly   If blood glucose continues to be elevated, will consider starting Lantus 30 unit nightly.  Increase lispro to 9 units from 7 units 3 times daily with meals  Start sliding scale insulin  Fall  Unclear mechanism, but occurred overnight getting up from bed in setting of acute infectious process  Patient unsure whether she has had any recent medication changes, longstanding prescription for Xanax as needed  Per patient's spouse, patient walks without assistive device at baseline  Orthostatic blood pressure negative    Plan:  Isolyte 75 cc/h for 12 hours for orthostatic blood pressure  Thigh-high compression stocking  PT/OT eval and treat  Restart as-needed Xanax as needed given patient is A&Ox3 this morning  Can discuss trial weaning of benzodiazepines outpatient prior discharge    Stage 3b chronic kidney disease ([facility])  Lab Results   Component Value Date    EGFR 42 02/15/2025    EGFR 39 02/14/2025    EGFR 45 02/13/2025    CREATININE 1.23 02/15/2025    CREATININE 1.30 02/14/2025    CREATININE 1.15 02/13/2025   Baseline creatinine appears to be  1.5-1.7     Medical Problems       Resolved Problems  Date Reviewed: 2/11/2025          Resolved    Toxic metabolic encephalopathy 2/15/2025     Resolved by  Tatianna Lopez MD    Sepsis (HCC) 2/11/2025     Resolved by  David Carter MD    Hyperkalemia 2/11/2025     Resolved by  David Carter MD        Discharging Physician / Practitioner: Tatianna Lopez MD  PCP: Sukumar Clemens DO  Admission Date:   Admission Orders (From admission, onward)       Ordered        02/11/25 0833  INPATIENT ADMISSION  Once                          Discharge Date: 02/15/25    Consultations During Hospital Stay:  None    Procedures Performed:   None    Significant Findings / Test Results:    bilateral groundglass opacities (multifocal pneumonia) and lungs    Incidental Findings:   no   I reviewed the above mentioned incidental findings with the patient and/or family and they expressed understanding.    Test Results Pending at Discharge (will require follow up):   None     Outpatient Tests Requested:  None    Complications: None    Reason for Admission: Fall, bilateral groundglass appearance (multifocal pneumonia).    Hospital Course:   Yajaira Marsh is a 79 y.o. female patient with past medical history of CAD s/p CABG, PCI, type 2 diabetes mellitus, PAD, asthma, hypertension, hyperlipidemia, hypothyroidism, obesity who originally presented to the hospital on 2/11/2025 due to fall.  Trauma workup unremarkable.  Patient had 1 episode of emesis and confusion/disorientation at home before she came here.  Patient is independent in ADL.    In ED, patient met severe sepsis criteria in the background of tachycardia, tachypnea, leukocytosis.  The possible source of infection was multifocal pneumonia in the background of CT imaging shows multilobar groundglass opacity and tree-in-bud opacities so patient was started on ceftriaxone and azithromycin.    PT/OT did recommend level 3 disposition.  Patient's VBG, beta-hydroxybutyrate,  "CK, lactic acid were unremarkable in Pro-Tee was slightly elevated.    The possible reason for confusion/disorientation was multifocal pneumonia and it got resolved during this admission with antibiotics.  On 2/15, patient was stable to be discharged to home    On 2/15, patient was stable to be discharged to home and patient completed 5 days course of antibiotics.    Please see above list of diagnoses and related plan for additional information.     Condition at Discharge: good    Discharge Day Visit / Exam:   Subjective: Patient is stable and well.  As per the patient, there were no significant overnight events.  Patient was dry show 75 cc/h fluid was initiated.  Vitals: Blood Pressure: 149/67 (02/15/25 0545)  Pulse: 96 (02/15/25 0545)  Temperature: 98.8 °F (37.1 °C) (02/14/25 2234)  Temp Source: Oral (02/14/25 0700)  Respirations: 20 (02/15/25 0545)  Height: 5' 2\" (157.5 cm) (02/11/25 1703)  Weight - Scale: 86.3 kg (190 lb 4.1 oz) (02/15/25 0600)  SpO2: 97 % (02/14/25 2234)  Physical Exam  Vitals and nursing note reviewed.   Constitutional:       Appearance: Normal appearance. She is obese.      Comments: Dry mucous membrane   Eyes:      General: No scleral icterus.        Right eye: No discharge.         Left eye: No discharge.   Cardiovascular:      Rate and Rhythm: Regular rhythm. Tachycardia present.      Pulses: Normal pulses.      Heart sounds: Normal heart sounds. No murmur heard.     No friction rub. No gallop.   Pulmonary:      Effort: Pulmonary effort is normal. No respiratory distress.      Breath sounds: Normal breath sounds. No stridor. No wheezing or rales.   Chest:      Chest wall: No tenderness.   Abdominal:      General: Bowel sounds are normal. There is no distension.      Palpations: Abdomen is soft.      Tenderness: There is no abdominal tenderness. There is no guarding.   Musculoskeletal:         General: Normal range of motion.      Cervical back: Normal range of motion.      Right lower leg: " No edema.      Left lower leg: No edema.   Skin:     General: Skin is warm.      Capillary Refill: Capillary refill takes less than 2 seconds.      Coloration: Skin is not jaundiced or pale.   Neurological:      General: No focal deficit present.      Mental Status: She is alert and oriented to person, place, and time. Mental status is at baseline.   Psychiatric:         Mood and Affect: Mood normal.         Behavior: Behavior normal.         Thought Content: Thought content normal.         Judgment: Judgment normal.          Discussion with Family: Patient declined call to .     Discharge instructions/Information to patient and family:   See after visit summary for information provided to patient and family.      Provisions for Follow-Up Care:  See after visit summary for information related to follow-up care and any pertinent home health orders.      Mobility at time of Discharge:   Basic Mobility Inpatient Raw Score: 17  JH-HLM Goal: 5: Stand one or more mins  JH-HLM Achieved: 4: Move to chair/commode  HLM Goal achieved. Continue to encourage appropriate mobility.     Disposition:   Home    Planned Readmission: No    Discharge Medications:  See after visit summary for reconciled discharge medications provided to patient and/or family.      Administrative Statements   Discharge Statement:  I have spent a total time of 30 minutes in caring for this patient on the day of the visit/encounter. .    **Please Note: This note may have been constructed using a voice recognition system**

## 2025-02-15 NOTE — ASSESSMENT & PLAN NOTE
Lab Results   Component Value Date    EGFR 42 02/15/2025    EGFR 39 02/14/2025    EGFR 45 02/13/2025    CREATININE 1.23 02/15/2025    CREATININE 1.30 02/14/2025    CREATININE 1.15 02/13/2025   Baseline creatinine appears to be 1.5-1.7

## 2025-02-15 NOTE — ASSESSMENT & PLAN NOTE
Unclear mechanism, but occurred overnight getting up from bed in setting of acute infectious process  Patient unsure whether she has had any recent medication changes, longstanding prescription for Xanax as needed  Per patient's spouse, patient walks without assistive device at baseline  Orthostatic blood pressure negative    Plan:  Isolyte 75 cc/h for 12 hours for orthostatic blood pressure  Thigh-high compression stocking  PT/OT eval and treat  Restart as-needed Xanax as needed given patient is A&Ox3 this morning  Can discuss trial weaning of benzodiazepines outpatient prior discharge

## 2025-02-16 LAB
BACTERIA BLD CULT: NORMAL
BACTERIA BLD CULT: NORMAL

## 2025-02-17 ENCOUNTER — TRANSITIONAL CARE MANAGEMENT (OUTPATIENT)
Dept: INTERNAL MEDICINE CLINIC | Facility: CLINIC | Age: 80
End: 2025-02-17

## 2025-02-17 ENCOUNTER — OFFICE VISIT (OUTPATIENT)
Dept: INTERNAL MEDICINE CLINIC | Facility: CLINIC | Age: 80
End: 2025-02-17

## 2025-02-17 DIAGNOSIS — Z79.4 TYPE 2 DIABETES MELLITUS WITH COMPLICATION, WITH LONG-TERM CURRENT USE OF INSULIN (HCC): ICD-10-CM

## 2025-02-17 DIAGNOSIS — E11.8 TYPE 2 DIABETES MELLITUS WITH COMPLICATION, WITH LONG-TERM CURRENT USE OF INSULIN (HCC): ICD-10-CM

## 2025-02-17 LAB
DME PARACHUTE DELIVERY DATE REQUESTED: NORMAL
DME PARACHUTE ITEM DESCRIPTION: NORMAL
DME PARACHUTE ITEM DESCRIPTION: NORMAL
DME PARACHUTE ORDER STATUS: NORMAL
DME PARACHUTE SUPPLIER NAME: NORMAL
DME PARACHUTE SUPPLIER PHONE: NORMAL

## 2025-02-17 PROCEDURE — PBNCHG PB NO CHARGE PLACEHOLDER: Performed by: PHARMACIST

## 2025-02-17 NOTE — PROGRESS NOTES
[facility] Clinical Pharmacy Services  Johana Duran, Pharmacist        Assessment/ Plan     Assessment & Plan  Type 2 diabetes mellitus with complication, with long-term current use of insulin ([facility])    Lab Results   Component Value Date    HGBA1C 12.4 (A) 02/10/2025   Patient not currently wearing CGM, no SMBG brought today  Unable to adjust insulin doses   reports sporadic compliance  Provided Dexcom G7 sensor samples, patient has reader  Sent Dex om G7 order to [facility]   Discussed SGLT2 and GLP1RA medications  Cost is barrier - will apply for PAP  Provided Jardiance samples today to get started    Orders:    Ambulatory referral to clinical pharmacy      Follow-up: 2 weeks    Subjective     Medication Adherence/ Tolerability/ Cost:  Patient denies side effects, no issues with cost, some missed doses in last two week  ALPRAZolam  B-D UF III MINI PEN NEEDLES Misc  BD Insulin Syringe U/F Misc  cholecalciferol  Co Q-10 Caps  Dexcom G6 Sensor Misc  Dexcom G7 Sensor  diltiazem  fluticasone  insulin aspart  irbesartan  isosorbide mononitrate  levothyroxine  rosuvastatin  torsemide  Toujeo SoloStar Sopn      2. Lifestyle:   Last visit with dietician: no  Previously attended Living Well with Diabetes Class: no  Diet Recall:   Breakfast:   Lunch:   Dinner:   Snacks:   Beverages:   Physical Activity: none    3. Home monitoring devices  Glucometer: Yes, Brand: unknown  Continuous Glucose Monitor: Yes, Brand: dexccom g7  Blood Pressure monitor: No, Brand: n/a    Hypoglycemia: The patient had 0 episodes/symptoms of hypoglycemia.   Hyperglycemia: The patient had some symptoms of hyperglycemia.     Objective       Blood Sugar Readings  CGM Report scanned into [employer] - uses reader, will need to bring in physically to download  The patient is currently checking blood glucose 1 times per day. Patient does not report with SMBG logs.    Date AM Post-Breakfast Lunch Post-Lunch Dinner Post-Dinner Comments                                                                                                                            Avg                ASCVD Risk:  The ASCVD Risk score (Vinayak GUARDADO, et al., 2019) failed to calculate for the following reasons:    Cannot find a previous HDL lab    Cannot find a previous total cholesterol lab     Vitals:  There were no vitals filed for this visit.    Eye Exam:    Lab Results   Component Value Date    LEFTDIABRET None 09/27/2023    RIGHTDIABRET None 09/27/2023       Labs:  Lab Results   Component Value Date    SODIUM 135 02/15/2025    K 4.0 02/15/2025    EGFR 42 02/15/2025    CREATININE 1.23 02/15/2025    GLUF 154 (H) 09/02/2023    OGEUTWHN32 262 09/29/2016    MICROALBCRE 79 (H) 04/22/2024       Lab Results   Component Value Date    HGBA1C 12.4 (A) 02/10/2025    HGBA1C 10.8 (H) 08/21/2024    HGBA1C 11.9 (H) 04/22/2024         Pharmacist Tracking Tool     Pharmacist Tracking Tool  Reason For Outreach: Embedded Pharmacist  Demographics:  Intervention Method: In Person  Type of Intervention: New  Topics Addressed: Diabetes  Pharmacologic Interventions: Medication Initiation, Prevent or Manage ADALID, and Med Rec  Non-Pharmacologic Interventions: Adherence addressed, Care coordination, Chart update, Cost, Disease state education, Home Monitoring, Labs, Medication Monitoring, Medication/Device education, and Personal CGM  Time:  Direct Patient Care:  45  mins  Care Coordination:  25  mins  Recommendation Recipient: Patient/Caregiver  Outcome: Accepted

## 2025-02-17 NOTE — UTILIZATION REVIEW
NOTIFICATION OF ADMISSION DISCHARGE   This is a Notification of Discharge from [facility]. Please be advised that this patient has been discharge from our facility. Below you will find the admission and discharge date and time including the patient’s disposition.   UTILIZATION REVIEW CONTACT:  Soni Hill  Utilization   Network Utilization Review Department  Phone: [phone] x carefully listen to the prompts. All voicemails are confidential.  Email: [email or web address]     ADMISSION INFORMATION  PRESENTATION DATE: 2/11/2025  4:57 AM  OBERVATION ADMISSION DATE: N/A  INPATIENT ADMISSION DATE: 2/11/25  8:34 AM   DISCHARGE DATE: 2/15/2025  1:05 PM   DISPOSITION:Home/Self Care    Network Utilization Review Department  ATTENTION: Please call with any questions or concerns to [phone] and carefully listen to the prompts so that you are directed to the right person. All voicemails are confidential.   For Discharge needs, contact Care Management DC Support Team at [phone] opt. 2  Send all requests for admission clinical reviews, approved or denied determinations and any other requests to dedicated fax number below belonging to the campus where the patient is receiving treatment. List of dedicated fax numbers for the Facilities:  FACILITY NAME UR FAX NUMBER   ADMISSION DENIALS (Administrative/Medical Necessity) [phone]   DISCHARGE SUPPORT TEAM ([facility]) [phone]   PARENT CHILD HEALTH (Maternity/NICU/Pediatrics) [phone]   [facility] [phone]   [facility] [phone]   [facility] [phone]   [facility] [phone]   [facility] [phone]   [facility] [phone]   [facility] [phone]   [facility] [phone]    [facility] [phone]   [facility] [phone]   [facility] [phone]   [facility] [phone]

## 2025-02-17 NOTE — ASSESSMENT & PLAN NOTE
Lab Results   Component Value Date    HGBA1C 12.4 (A) 02/10/2025   Patient not currently wearing CGM, no SMBG brought today  Unable to adjust insulin doses   reports sporadic compliance  Provided Dexcom G7 sensor samples, patient has reader  Sent Dex om G7 order to [facility] medicatl   Discussed SGLT2 and GLP1RA medications  Cost is barrier - will apply for PAP  Provided Jardiance samples today to get started    Orders:    Ambulatory referral to clinical pharmacy

## 2025-02-19 ENCOUNTER — OFFICE VISIT (OUTPATIENT)
Dept: INTERNAL MEDICINE CLINIC | Facility: CLINIC | Age: 80
End: 2025-02-19
Payer: COMMERCIAL

## 2025-02-19 VITALS
DIASTOLIC BLOOD PRESSURE: 64 MMHG | OXYGEN SATURATION: 95 % | HEART RATE: 62 BPM | SYSTOLIC BLOOD PRESSURE: 142 MMHG | BODY MASS INDEX: 34.02 KG/M2 | WEIGHT: 186 LBS

## 2025-02-19 DIAGNOSIS — Z23 NEED FOR COVID-19 VACCINE: Primary | ICD-10-CM

## 2025-02-19 DIAGNOSIS — N18.4 STAGE 4 CHRONIC KIDNEY DISEASE (HCC): ICD-10-CM

## 2025-02-19 DIAGNOSIS — F41.9 ANXIETY: ICD-10-CM

## 2025-02-19 DIAGNOSIS — F41.1 GAD (GENERALIZED ANXIETY DISORDER): ICD-10-CM

## 2025-02-19 DIAGNOSIS — F41.0 PANIC ATTACKS: ICD-10-CM

## 2025-02-19 DIAGNOSIS — R29.898 MUSCULAR DECONDITIONING: ICD-10-CM

## 2025-02-19 DIAGNOSIS — J18.9 COMMUNITY ACQUIRED PNEUMONIA, UNSPECIFIED LATERALITY: ICD-10-CM

## 2025-02-19 DIAGNOSIS — M25.511 ACUTE PAIN OF RIGHT SHOULDER: ICD-10-CM

## 2025-02-19 PROCEDURE — 99496 TRANSJ CARE MGMT HIGH F2F 7D: CPT | Performed by: INTERNAL MEDICINE

## 2025-02-19 RX ORDER — ALPRAZOLAM 0.5 MG
0.5 TABLET ORAL DAILY PRN
Start: 2025-02-19

## 2025-02-19 NOTE — PROGRESS NOTES
Transition of Care Visit  Name: Yajaira Marsh      : 1945      MRN: [id number]  Encounter Provider: Sukumar Clemens DO  Encounter Date: 2025   Encounter department: [facility]    Assessment & Plan  Need for COVID-19 vaccine  Patient will consider this vaccine in the next several weeks after fully recovered from her pneumonia       Community acquired pneumonia, unspecified laterality  Transition care management visit regarding recent hospitalization with regards to community-acquired pneumonia the patient did develop a cough starting 1 week prior to the presentation to the hospital she did meet criteria for sepsis with a tachycardia of 101 tachypnea of 28 leukocytosis of 17.82.  Her Pro-Tee level was elevated at 2.56 her CAT scan of the chest was notable for groundglass opacities and tree-in-bud opacities within both lower lobes most significantly in the right middle lobe suspicious for atypical pneumonia.  The patient was subsequently started on azithromycin and ceftriaxone IV she completed her IV antibiotic dose in the hospital with subsequent improvement in the symptoms.  Patient does report to me deconditioning since hospitalization fatigue improving cough low appetite which is slowly improving.  Today I did review the discharge summary laboratories and test completed during the hospitalization patient will be starting physical therapy I did ask her to use her walker at all times to prevent falls I will complete a CMP CBC and chest x-ray in 4 weeks RTO as scheduled call if any problems  Orders:  •  Comprehensive metabolic panel; Future  •  CBC (Includes Diff/Plt) (Refl); Future  •  XR chest pa and lateral; Future  On examination today her lung examination is clear no wheezing no crackles no rhonchi pulse ox is normal on room air 95% deconditioning  Acute pain of right shoulder  Would like the patient to see orthopedic shoulder expert Dr. Ceja her symptoms are  consistent with a rotator cuff tendinitis she does have limited range of motion and pain with internal/external rotation and extension of the shoulder  Orders:  •  Ambulatory Referral to Orthopedic Surgery; Future    Stage 4 chronic kidney disease (HCC)  Lab Results   Component Value Date    EGFR 42 02/15/2025    EGFR 39 02/14/2025    EGFR 45 02/13/2025    CREATININE 1.23 02/15/2025    CREATININE 1.30 02/14/2025    CREATININE 1.15 02/13/2025        Drink adequate amount of water, avoid anti-inflammatories, reduce sodium in the diet and will continue to monitor the kidney function  Anxiety  Today we did have a long discussion about slowly weaning off of the Xanax because of the fall risk associated with the medicines she has been on this medicine for many years and has been helpful for her anxiety but she is in agreements to slowly wean down the medicine very slowly for step will reduce the dose of Xanax to 0.5 mg 1 tablet daily for the next month when we see her again we will reassess and try to taper her down to half of a tablet for a month, if there is increased levels of anxiety she will notify us for consideration of increase of the Effexor.  Currently she is emotionally stable  Orders:  •  ALPRAZolam (XANAX) 0.5 mg tablet; Take 1 tablet (0.5 mg total) by mouth daily as needed for anxiety    Panic attacks  Clinically stable and doing well continue the current medical regiment will continue monitor.  As above  Orders:  •  ALPRAZolam (XANAX) 0.5 mg tablet; Take 1 tablet (0.5 mg total) by mouth daily as needed for anxiety    MADAN (generalized anxiety disorder)    Orders:  •  ALPRAZolam (XANAX) 0.5 mg tablet; Take 1 tablet (0.5 mg total) by mouth daily as needed for anxiety    Muscular deconditioning  Start physical therapy use walker at all times fall precautions we also talked about safe home environment they do have a shower seat, safety bars in the bathroom making a clear path so patient can negotiate with the  walker removing the coffee table          RTO in 2 months call if any problems  History of Present Illness     Transitional Care Management Review:   Yajaira Marsh is a 79 y.o. female here for TCM follow up.  Transition care management visit regarding recent hospitalization for atypical pneumonia today we did review the transition care management discharge summary laboratories and test during the hospitalization the CAT scan with the patient and her  in detail.  Patient did complete her antibiotic course while in the hospital.  She has had improvements in the overall symptoms although low appetite which is slowly improving fatigue and cough which has improved significantly.  No fevers no chills.  She is deconditioned prior to hospitalization she was having difficulty with deconditioning she has had a fall prior to hospitalization she has not started physical therapy but will be starting therapy in the near future.  Ongoing right shoulder pain since this fall she landed on her left shoulder she does have pain with internal and external rotation of that shoulder.  She has not been using her walker at home her  brings this to our attention she does appear to be steady at home and he reports me he is not able to get her off the floor if she falls again and will need to call 911.  Patient has agreed to start using a walker at home routinely and attending physical therapy.  Patient reports that her anxiety levels are stable we did have a conversation about slowly weaning off the Xanax which she has been on for many years for anxiety and panic attack she is in agreement I did explain to her if there is a risk for falling down with this medication and it would be a good idea to slowly wean off the medication she will be seeing a new endocrinologist at [facility] regarding her diabetes reports me having difficulty with her sensor obtaining a new sensor currently working with the company regards to  this    During the TCM phone call patient stated:  TCM Call     Date and time call was made  2/17/2025 11:04 AM    Hospital care reviewed  Records not available    Patient was hospitialized at  [facility]    Date of Admission  02/11/25    Date of discharge  02/15/25    Diagnosis  Community acquired pneumonia    Disposition  Home    Were the patients medications reviewed and updated  No    Current Symptoms  None      TCM Call     Post hospital issues  None    Should patient be enrolled in anticoag monitoring?  No    Scheduled for follow up?  Yes    Did you obtain your prescribed medications  No    Do you need help managing your prescriptions or medications  No    Is transportation to your appointment needed  No    I have advised the patient to call PCP with any new or worsening symptoms  Carole Love  MR/MA    Are you recieving any outpatient services  No    Are you recieving home care services  No    Have you fallen in the last 12 months  Yes    How many times  1-2    Interperter language line needed  No        HPI  Review of Systems   Constitutional:  Positive for activity change, appetite change and fatigue. Negative for diaphoresis, fever and unexpected weight change.   HENT:  Negative for congestion and postnasal drip.    Eyes:  Negative for visual disturbance.   Respiratory:  Negative for cough and shortness of breath.    Cardiovascular:  Negative for chest pain.   Gastrointestinal:  Negative for abdominal pain, diarrhea, nausea and vomiting.   Musculoskeletal:         Right shoulder pain   Neurological:  Negative for dizziness, light-headedness and headaches.   Hematological:  Negative for adenopathy.     Objective   /64 (BP Location: Left arm, Patient Position: Sitting, Cuff Size: Standard)   Pulse 62   Wt 84.4 kg (186 lb)   SpO2 95%   BMI 34.02 kg/m²     Physical Exam  Vitals and nursing note reviewed.   Constitutional:       General: She is not in acute distress.     Appearance: Normal  appearance. She is well-developed. She is obese. She is not ill-appearing, toxic-appearing or diaphoretic.   HENT:      Head: Normocephalic and atraumatic.      Right Ear: External ear normal.      Left Ear: External ear normal.      Nose: Nose normal.   Eyes:      Pupils: Pupils are equal, round, and reactive to light.   Cardiovascular:      Rate and Rhythm: Normal rate and regular rhythm.      Heart sounds: Normal heart sounds. No murmur heard.  Pulmonary:      Effort: Pulmonary effort is normal.      Breath sounds: Normal breath sounds.   Abdominal:      General: There is no distension.      Palpations: Abdomen is soft.      Tenderness: There is no abdominal tenderness. There is no guarding.   Neurological:      Mental Status: She is alert.

## 2025-02-19 NOTE — ASSESSMENT & PLAN NOTE
Lab Results   Component Value Date    EGFR 42 02/15/2025    EGFR 39 02/14/2025    EGFR 45 02/13/2025    CREATININE 1.23 02/15/2025    CREATININE 1.30 02/14/2025    CREATININE 1.15 02/13/2025        Drink adequate amount of water, avoid anti-inflammatories, reduce sodium in the diet and will continue to monitor the kidney function

## 2025-02-20 ENCOUNTER — TELEPHONE (OUTPATIENT)
Age: 80
End: 2025-02-20

## 2025-02-20 NOTE — ASSESSMENT & PLAN NOTE
Today we did have a long discussion about slowly weaning off of the Xanax because of the fall risk associated with the medicines she has been on this medicine for many years and has been helpful for her anxiety but she is in agreements to slowly wean down the medicine very slowly for step will reduce the dose of Xanax to 0.5 mg 1 tablet daily for the next month when we see her again we will reassess and try to taper her down to half of a tablet for a month, if there is increased levels of anxiety she will notify us for consideration of increase of the Effexor.  Currently she is emotionally stable  Orders:  •  ALPRAZolam (XANAX) 0.5 mg tablet; Take 1 tablet (0.5 mg total) by mouth daily as needed for anxiety

## 2025-02-20 NOTE — ASSESSMENT & PLAN NOTE
Orders:  •  ALPRAZolam (XANAX) 0.5 mg tablet; Take 1 tablet (0.5 mg total) by mouth daily as needed for anxiety

## 2025-02-20 NOTE — ASSESSMENT & PLAN NOTE
Start physical therapy use walker at all times fall precautions we also talked about safe home environment they do have a shower seat, safety bars in the bathroom making a clear path so patient can negotiate with the walker removing the coffee table

## 2025-02-20 NOTE — ASSESSMENT & PLAN NOTE
Clinically stable and doing well continue the current medical regiment will continue monitor.  As above  Orders:  •  ALPRAZolam (XANAX) 0.5 mg tablet; Take 1 tablet (0.5 mg total) by mouth daily as needed for anxiety

## 2025-02-20 NOTE — TELEPHONE ENCOUNTER
Received call from Ale, Nurse care  initiation of enrolling patient into transitional  care program; make weekly calls to patient. Ale is concerned post medication review with patient regarding her insulins and wanted to confirm ordered dosing and patient reports.   Hospital Discharge summary kathya CORDOVA post 2/19 OV both have:   Toujeo insulin 62 units in AM; patient reports taking 52 units in evening also  Novolg 18 units BID with meals   Patient reports taking Lantus 24 units at HS; which was discontinued.  Ale would appreciate a return call to confirm patient is only prescribed Toujeo and Novolog insulins; there is NO PM dose for Toujeo, and no Lantus insulin ordered. Provider also needs to follow up with patient for clarification.

## 2025-02-21 ENCOUNTER — TELEPHONE (OUTPATIENT)
Age: 80
End: 2025-02-21

## 2025-02-21 ENCOUNTER — TELEMEDICINE (OUTPATIENT)
Dept: INTERNAL MEDICINE CLINIC | Facility: CLINIC | Age: 80
End: 2025-02-21

## 2025-02-21 DIAGNOSIS — E11.8 TYPE 2 DIABETES MELLITUS WITH COMPLICATION, WITH LONG-TERM CURRENT USE OF INSULIN (HCC): Primary | ICD-10-CM

## 2025-02-21 DIAGNOSIS — Z79.4 TYPE 2 DIABETES MELLITUS WITH COMPLICATION, WITH LONG-TERM CURRENT USE OF INSULIN (HCC): Primary | ICD-10-CM

## 2025-02-21 PROCEDURE — PBNCHG PB NO CHARGE PLACEHOLDER: Performed by: PHARMACIST

## 2025-02-21 NOTE — PROGRESS NOTES
[facility] Clinical Pharmacy Services  Johana Duran, Pharmacist        Assessment/ Plan     Assessment & Plan  Type 2 diabetes mellitus with complication, with long-term current use of insulin ([facility])    Lab Results   Component Value Date    HGBA1C 12.4 (A) 02/10/2025   Used to see endo Dr. Oliva  She had Lantus for a time then he switched her to Toujeo  She currently has Toujeo and (generic) Novolog  She is correctly taking 52 units ONCE daily of Toujeo  She is correctly taking 18 units generic Novolog BID with meals  She has not yet applied the Dexcom CGM I provided  I will send a new order via [place] as [facility] is not contracted with her plan           Follow-up: 2 weeks    Subjective     Medication Adherence/ Tolerability/ Cost:  Patient denies side effects, no issues with cost, some missed doses in last two week  ALPRAZolam  B-D UF III MINI PEN NEEDLES Misc  BD Insulin Syringe U/F Misc  cholecalciferol  Co Q-10 Caps  Dexcom G6 Sensor Misc  Dexcom G7 Sensor  diltiazem  fluticasone  insulin aspart  irbesartan  isosorbide mononitrate  levothyroxine  rosuvastatin  torsemide  Toujeo SoloStar Sopn      2. Lifestyle:   Last visit with dietician: no  Previously attended Living Well with Diabetes Class: no  Diet Recall:   Breakfast:   Lunch:   Dinner:   Snacks:   Beverages:   Physical Activity: none    3. Home monitoring devices  Glucometer: Yes, Brand: unknown  Continuous Glucose Monitor: Yes, Brand: dexccom g7  Blood Pressure monitor: No, Brand: n/a    Hypoglycemia: The patient had 0 episodes/symptoms of hypoglycemia.   Hyperglycemia: The patient had some symptoms of hyperglycemia.     Objective       Blood Sugar Readings  CGM Report scanned into [employer], will need to bring in physically to download  The patient is currently checking blood glucose 1 times per day. Patient does not report with SMBG logs.    Date AM Post-Breakfast Lunch Post-Lunch Dinner Post-Dinner Comments                                                                                                                            Avg                ASCVD Risk:  The ASCVD Risk score (Vinayak GUARDADO, et al., 2019) failed to calculate for the following reasons:    Cannot find a previous HDL lab    Cannot find a previous total cholesterol lab     Vitals:  There were no vitals filed for this visit.    Eye Exam:    Lab Results   Component Value Date    LEFTDIABRET None 09/27/2023    RIGHTDIABRET None 09/27/2023       Labs:  Lab Results   Component Value Date    SODIUM 135 02/15/2025    K 4.0 02/15/2025    EGFR 42 02/15/2025    CREATININE 1.23 02/15/2025    GLUF 154 (H) 09/02/2023    KRVDASWD32 262 09/29/2016    MICROALBCRE 79 (H) 04/22/2024       Lab Results   Component Value Date    HGBA1C 12.4 (A) 02/10/2025    HGBA1C 10.8 (H) 08/21/2024    HGBA1C 11.9 (H) 04/22/2024         Pharmacist Tracking Tool     Pharmacist Tracking Tool  Reason For Outreach: Embedded Pharmacist  Demographics:  Intervention Method: Phone  Type of Intervention: Follow-Up  Topics Addressed: Diabetes  Pharmacologic Interventions: Prevent or Manage ADALID and Med Rec  Non-Pharmacologic Interventions: Adherence addressed, Care coordination, Chart update, Cost, Disease state education, Home Monitoring, Labs, Medication Monitoring, Medication/Device education, and Personal CGM  Time:  Direct Patient Care:  30  mins  Care Coordination:  25  mins  Recommendation Recipient: Patient/Caregiver  Outcome: Accepted

## 2025-02-21 NOTE — ASSESSMENT & PLAN NOTE
Lab Results   Component Value Date    HGBA1C 12.4 (A) 02/10/2025   Used to see wilda Oliva  She had Lantus for a time then he switched her to Toujeo  She currently has Toujeo and (generic) Novolog  She is correctly taking 52 units ONCE daily of Toujeo  She is correctly taking 18 units generic Novolog BID with meals  She has not yet applied the Dexcom CGM I provided  I will send a new order via [employer] as [facility] is not contracted with her plan

## 2025-02-21 NOTE — TELEPHONE ENCOUNTER
Ale from [facility] called to verify patients diabetes regimen. Ale received a call from Johana the pharmacist from [facility] but was unable to hear all of the message.       Ale states she just talked to the patient a few minutes ago and the patient states she takes the Toujeo 52 units in the morning.  Patient seems unclear about what she is supposed to be doing.   I read the pharmacist note to Ale.      Ale is requesting to speak with Johana directly.  Please have Johana call Ale from [facility] at  [phone]  ext [phone] asap.

## 2025-02-21 NOTE — TELEPHONE ENCOUNTER
Done. Patient is clear now on what she is doing. Toujeo 52 units once daily, novolog 18 units BID.

## 2025-02-24 DIAGNOSIS — R09.82 POST-NASAL DRIP: ICD-10-CM

## 2025-02-25 RX ORDER — FLUTICASONE PROPIONATE 50 MCG
SPRAY, SUSPENSION (ML) NASAL
Qty: 48 ML | Refills: 1 | Status: SHIPPED | OUTPATIENT
Start: 2025-02-25

## 2025-03-04 ENCOUNTER — APPOINTMENT (EMERGENCY)
Dept: CT IMAGING | Facility: HOSPITAL | Age: 80
End: 2025-03-04
Payer: COMMERCIAL

## 2025-03-04 ENCOUNTER — HOSPITAL ENCOUNTER (EMERGENCY)
Facility: HOSPITAL | Age: 80
Discharge: HOME | End: 2025-03-04
Attending: STUDENT IN AN ORGANIZED HEALTH CARE EDUCATION/TRAINING PROGRAM
Payer: COMMERCIAL

## 2025-03-04 ENCOUNTER — NURSE TRIAGE (OUTPATIENT)
Age: 80
End: 2025-03-04

## 2025-03-04 VITALS
BODY MASS INDEX: 33.06 KG/M2 | RESPIRATION RATE: 20 BRPM | DIASTOLIC BLOOD PRESSURE: 76 MMHG | SYSTOLIC BLOOD PRESSURE: 183 MMHG | HEART RATE: 93 BPM | WEIGHT: 180.78 LBS | OXYGEN SATURATION: 96 % | TEMPERATURE: 98.2 F

## 2025-03-04 DIAGNOSIS — R19.7 DIARRHEA: ICD-10-CM

## 2025-03-04 DIAGNOSIS — R45.89 ANXIETY ABOUT HEALTH: ICD-10-CM

## 2025-03-04 DIAGNOSIS — E86.0 DEHYDRATION: Primary | ICD-10-CM

## 2025-03-04 LAB
ALBUMIN SERPL BCG-MCNC: 3.8 G/DL (ref 3.5–5)
ALP SERPL-CCNC: 106 U/L (ref 34–104)
ALT SERPL W P-5'-P-CCNC: 15 U/L (ref 7–52)
ANION GAP SERPL CALCULATED.3IONS-SCNC: 11 MMOL/L (ref 4–13)
AST SERPL W P-5'-P-CCNC: 20 U/L (ref 13–39)
B-OH-BUTYR SERPL-MCNC: 0.06 MMOL/L (ref 0.02–0.27)
BASE EX.OXY STD BLDV CALC-SCNC: 85.5 % (ref 60–80)
BASE EXCESS BLDV CALC-SCNC: 1.3 MMOL/L
BASOPHILS # BLD MANUAL: 0.2 THOUSAND/UL (ref 0–0.1)
BASOPHILS NFR MAR MANUAL: 2 % (ref 0–1)
BILIRUB SERPL-MCNC: 0.61 MG/DL (ref 0.2–1)
BUN SERPL-MCNC: 17 MG/DL (ref 5–25)
CALCIUM SERPL-MCNC: 9.7 MG/DL (ref 8.4–10.2)
CARDIAC TROPONIN I PNL SERPL HS: 5 NG/L (ref ?–50)
CHLORIDE SERPL-SCNC: 104 MMOL/L (ref 96–108)
CO2 SERPL-SCNC: 23 MMOL/L (ref 21–32)
CREAT SERPL-MCNC: 1.36 MG/DL (ref 0.6–1.3)
EOSINOPHIL # BLD MANUAL: 0 THOUSAND/UL (ref 0–0.4)
EOSINOPHIL NFR BLD MANUAL: 0 % (ref 0–6)
ERYTHROCYTE [DISTWIDTH] IN BLOOD BY AUTOMATED COUNT: 14.7 % (ref 11.6–15.1)
GFR SERPL CREATININE-BSD FRML MDRD: 37 ML/MIN/1.73SQ M
GLUCOSE SERPL-MCNC: 141 MG/DL (ref 65–140)
GLUCOSE SERPL-MCNC: 181 MG/DL (ref 65–140)
GLUCOSE SERPL-MCNC: 193 MG/DL (ref 65–140)
GLUCOSE SERPL-MCNC: 202 MG/DL (ref 65–140)
HCO3 BLDV-SCNC: 21.7 MMOL/L (ref 24–30)
HCT VFR BLD AUTO: 37.6 % (ref 34.8–46.1)
HGB BLD-MCNC: 11.9 G/DL (ref 11.5–15.4)
LACTATE SERPL-SCNC: 1.7 MMOL/L (ref 0.5–2)
LIPASE SERPL-CCNC: 19 U/L (ref 11–82)
LYMPHOCYTES # BLD AUTO: 3.23 THOUSAND/UL (ref 0.6–4.47)
LYMPHOCYTES # BLD AUTO: 30 % (ref 14–44)
MAGNESIUM SERPL-MCNC: 1.7 MG/DL (ref 1.9–2.7)
MCH RBC QN AUTO: 27.5 PG (ref 26.8–34.3)
MCHC RBC AUTO-ENTMCNC: 31.6 G/DL (ref 31.4–37.4)
MCV RBC AUTO: 87 FL (ref 82–98)
MONOCYTES # BLD AUTO: 1.11 THOUSAND/UL (ref 0–1.22)
MONOCYTES NFR BLD: 11 % (ref 4–12)
NEUTROPHILS # BLD MANUAL: 5.56 THOUSAND/UL (ref 1.85–7.62)
NEUTS SEG NFR BLD AUTO: 55 % (ref 43–75)
O2 CT BLDV-SCNC: 16.1 ML/DL
PCO2 BLDV: 24.1 MM HG (ref 42–50)
PH BLDV: 7.57 [PH] (ref 7.3–7.4)
PHOSPHATE SERPL-MCNC: 2.1 MG/DL (ref 2.3–4.1)
PLATELET # BLD AUTO: 323 THOUSANDS/UL (ref 149–390)
PLATELET BLD QL SMEAR: ADEQUATE
PMV BLD AUTO: 10.7 FL (ref 8.9–12.7)
PO2 BLDV: 46.2 MM HG (ref 35–45)
POTASSIUM SERPL-SCNC: 4.5 MMOL/L (ref 3.5–5.3)
PROT SERPL-MCNC: 8 G/DL (ref 6.4–8.4)
RBC # BLD AUTO: 4.33 MILLION/UL (ref 3.81–5.12)
RBC MORPH BLD: NORMAL
SODIUM SERPL-SCNC: 138 MMOL/L (ref 135–147)
VARIANT LYMPHS # BLD AUTO: 2 %
WBC # BLD AUTO: 10.1 THOUSAND/UL (ref 4.31–10.16)

## 2025-03-04 PROCEDURE — 99285 EMERGENCY DEPT VISIT HI MDM: CPT | Performed by: STUDENT IN AN ORGANIZED HEALTH CARE EDUCATION/TRAINING PROGRAM

## 2025-03-04 PROCEDURE — 83735 ASSAY OF MAGNESIUM: CPT | Performed by: STUDENT IN AN ORGANIZED HEALTH CARE EDUCATION/TRAINING PROGRAM

## 2025-03-04 PROCEDURE — 85027 COMPLETE CBC AUTOMATED: CPT | Performed by: STUDENT IN AN ORGANIZED HEALTH CARE EDUCATION/TRAINING PROGRAM

## 2025-03-04 PROCEDURE — 80053 COMPREHEN METABOLIC PANEL: CPT | Performed by: STUDENT IN AN ORGANIZED HEALTH CARE EDUCATION/TRAINING PROGRAM

## 2025-03-04 PROCEDURE — 83690 ASSAY OF LIPASE: CPT | Performed by: STUDENT IN AN ORGANIZED HEALTH CARE EDUCATION/TRAINING PROGRAM

## 2025-03-04 PROCEDURE — 82805 BLOOD GASES W/O2 SATURATION: CPT | Performed by: STUDENT IN AN ORGANIZED HEALTH CARE EDUCATION/TRAINING PROGRAM

## 2025-03-04 PROCEDURE — 82010 KETONE BODYS QUAN: CPT | Performed by: STUDENT IN AN ORGANIZED HEALTH CARE EDUCATION/TRAINING PROGRAM

## 2025-03-04 PROCEDURE — 74177 CT ABD & PELVIS W/CONTRAST: CPT

## 2025-03-04 PROCEDURE — 82948 REAGENT STRIP/BLOOD GLUCOSE: CPT

## 2025-03-04 PROCEDURE — 96366 THER/PROPH/DIAG IV INF ADDON: CPT

## 2025-03-04 PROCEDURE — 84484 ASSAY OF TROPONIN QUANT: CPT | Performed by: STUDENT IN AN ORGANIZED HEALTH CARE EDUCATION/TRAINING PROGRAM

## 2025-03-04 PROCEDURE — 85007 BL SMEAR W/DIFF WBC COUNT: CPT | Performed by: STUDENT IN AN ORGANIZED HEALTH CARE EDUCATION/TRAINING PROGRAM

## 2025-03-04 PROCEDURE — 96365 THER/PROPH/DIAG IV INF INIT: CPT

## 2025-03-04 PROCEDURE — 83605 ASSAY OF LACTIC ACID: CPT | Performed by: STUDENT IN AN ORGANIZED HEALTH CARE EDUCATION/TRAINING PROGRAM

## 2025-03-04 PROCEDURE — 96375 TX/PRO/DX INJ NEW DRUG ADDON: CPT

## 2025-03-04 PROCEDURE — 99284 EMERGENCY DEPT VISIT MOD MDM: CPT

## 2025-03-04 PROCEDURE — 36415 COLL VENOUS BLD VENIPUNCTURE: CPT | Performed by: STUDENT IN AN ORGANIZED HEALTH CARE EDUCATION/TRAINING PROGRAM

## 2025-03-04 PROCEDURE — 84100 ASSAY OF PHOSPHORUS: CPT | Performed by: STUDENT IN AN ORGANIZED HEALTH CARE EDUCATION/TRAINING PROGRAM

## 2025-03-04 RX ORDER — SODIUM CHLORIDE, SODIUM GLUCONATE, SODIUM ACETATE, POTASSIUM CHLORIDE, MAGNESIUM CHLORIDE, SODIUM PHOSPHATE, DIBASIC, AND POTASSIUM PHOSPHATE .53; .5; .37; .037; .03; .012; .00082 G/100ML; G/100ML; G/100ML; G/100ML; G/100ML; G/100ML; G/100ML
2000 INJECTION, SOLUTION INTRAVENOUS ONCE
Status: COMPLETED | OUTPATIENT
Start: 2025-03-04 | End: 2025-03-04

## 2025-03-04 RX ORDER — LORAZEPAM 2 MG/ML
0.5 INJECTION INTRAMUSCULAR ONCE
Status: COMPLETED | OUTPATIENT
Start: 2025-03-04 | End: 2025-03-04

## 2025-03-04 RX ADMIN — LORAZEPAM 0.5 MG: 2 INJECTION INTRAMUSCULAR; INTRAVENOUS at 14:21

## 2025-03-04 RX ADMIN — IOHEXOL 70 ML: 350 INJECTION, SOLUTION INTRAVENOUS at 15:46

## 2025-03-04 RX ADMIN — SODIUM CHLORIDE, SODIUM GLUCONATE, SODIUM ACETATE, POTASSIUM CHLORIDE, MAGNESIUM CHLORIDE, SODIUM PHOSPHATE, DIBASIC, AND POTASSIUM PHOSPHATE 1000 ML: .53; .5; .37; .037; .03; .012; .00082 INJECTION, SOLUTION INTRAVENOUS at 14:01

## 2025-03-04 NOTE — TELEPHONE ENCOUNTER
"  FOLLOW UP: Patient calling post hospitalization with DC 2/11/25 for pneumonia and TCM OV 2/17/2. Patient keeps stating she doesn't feel well. C/o swollen tongue that is raw and making it hard to eat, moderate abdominal pain with diarrhea, cough and SOB heard during triage. Patient has hx anxiety and RN unsure if this is exacerbating symptoms. Patient needing to be redirected several times. Keeps stating I just feel lousy and worse than when I was in the hospital. RN tried to schedule OV but patient states she is too weak to make it in.     REASON FOR CONVERSATION: Tongue Swelling    SYMPTOMS: Patient reports tongue is raw, unable to eat, fatigue, abd pain, diarrhea. States she feels worse than when she was hospitalized    OTHER: Patient feels that she is too weak and unable to walk into office/UC. RN encouraged patient to call for transport and to go to the hospital for evaluation. Patient in agreement and will call.    DISPOSITION: Go to ED/[facility] Now (Or to Office with PCP Approval)  Reason for Disposition   Patient sounds very sick or weak to the triager    Answer Assessment - Initial Assessment Questions  1. SYMPTOM: \"What's the main symptom you're concerned about?\" (e.g., chapped lips, dry mouth, lump, sores)      Tongue is raw, making it difficult to eat, patient states feels lousy   2. ONSET: \"When did the  symptoms  start?\"      Going on since hospitalized-DC 2/11/25 for PNA  3. PAIN: \"Is there any pain?\" If Yes, ask: \"How bad is it?\" (Scale: 1-10; mild, moderate, severe)      Abdomen and tongue   4. CAUSE: \"What do you think is causing the symptoms?\"      Was diagnosed with PNA and hospitalized 2/11/25  5. OTHER SYMPTOMS: \"Do you have any other symptoms?\" (e.g., fever, sore throat, toothache, swelling)      Diarrhea, abdominal pain, coughing, SOB  6. PREGNANCY: \"Is there any chance you are pregnant?\" \"When was your last menstrual period?\"      NA    Protocols used: Mouth Symptoms-Adult-[place]    "

## 2025-03-04 NOTE — ED PROVIDER NOTES
Time reflects when diagnosis was documented in both MDM as applicable and the Disposition within this note       Time User Action Codes Description Comment    3/4/2025  4:26 PM Yolis Carrillo Add [E86.0] Dehydration     3/4/2025  4:26 PM Yolis Carrillo Add [R19.7] Diarrhea     3/4/2025  4:26 PM Yolis Carrillo Add [R45.89] Anxiety about health           ED Disposition       ED Disposition   Discharge    Condition   Stable    Date/Time   Tue Mar 4, 2025  4:26 PM    Comment   Yajaira Marsh discharge to home/self care.                   Assessment & Plan       Medical Decision Making  79-year-old female with history of insulin-dependent type 2 diabetes, CAD status post CABG, hyperlipidemia, ventral hernia, status post appendectomy and hysterectomy who presents with tongue dryness/irritation, abdominal pain, and diarrhea.  She reports 4-5 loose stools today without melena/hematochezia and generalized abdominal pain without nausea/vomiting, fever/chills, chest pain, shortness of breath, flank pain, dysuria/hematuria/frequency, vaginal bleeding/pain/itching/change in discharge, or any other concerns.    On my evaluation patient is tachypneic to 30s but satting well on room air with otherwise stable vital signs.  Mucous membranes are dry and she a appears profoundly hypovolemic on my examination.  She has a large, reducible ventral hernia with generalized abdominal tenderness to palpation without rebound/guarding or CVA tenderness.      Differential diagnosis includes but is not limited to: DKA, partial bowel obstruction, colitis, functional diarrhea, less likely C. difficile, anxiety    Workup and treatment as below:    Imaging: See ED course  EKG: See ED Course  Labs: See ED course  Meds: IV fluids, anxiolysis  Consults: N/A  Reassessment: See ED course    Dispo: I signed out this patient to Dr. Yolis Carrillo pending reassessment and CT results.    Amount and/or Complexity of Data Reviewed  Labs: ordered. Decision-making details  "documented in ED Course.  Radiology: ordered.    Risk  Prescription drug management.        ED Course as of 03/06/25 0752   Tue Mar 04, 2025   1338 POC Glucose(!): 202   1400 On reassessment, patient expressing severe \"anxiety\" and stating that she was worried that she could die.  She reports that she takes benzodiazepines at home for her anxiety and has not had any of these today.  Lorazepam ordered.   1408 ECG rate 77, normal sinus rhythm, left axis deviated with prolonged QTc and incomplete right bundle branch block present but no acute change when compared to prior ECG dated February 11, 2025.   1409 CBC and differential  Within normal limits   1447 LACTIC ACID: 1.7   1447 POC Glucose(!): 181   1447 pH, Dae(!): 7.573   1511 Phosphorus(!): 2.1   1511 MAGNESIUM(!): 1.7   1512 Creatinine(!): 1.36  Slight increase from baseline that does not meet rifle criteria   1512 Beta- Hydroxybutyrate: 0.06   1607 hs TnI 0hr: 5       Medications   multi-electrolyte (ISOLYTE-S PH 7.4) bolus 2,000 mL (0 mL Intravenous Stopped 3/4/25 1640)   LORazepam (ATIVAN) injection 0.5 mg (0.5 mg Intravenous Given 3/4/25 1421)   iohexol (OMNIPAQUE) 350 MG/ML injection (MULTI-DOSE) 70 mL (70 mL Intravenous Given 3/4/25 1546)       ED Risk Strat Scores                            SBIRT 22yo+      Flowsheet Row Most Recent Value   Initial Alcohol Screen: US AUDIT-C     1. How often do you have a drink containing alcohol? 0 Filed at: 03/04/2025 1508   2. How many drinks containing alcohol do you have on a typical day you are drinking?  0 Filed at: 03/04/2025 1508   3a. Male UNDER 65: How often do you have five or more drinks on one occasion? 0 Filed at: 03/04/2025 1508   3b. FEMALE Any Age, or MALE 65+: How often do you have 4 or more drinks on one occassion? 0 Filed at: 03/04/2025 1508   Audit-C Score 0 Filed at: 03/04/2025 1508   GERMAN: How many times in the past year have you...    Used an illegal drug or used a prescription medication for " non-medical reasons? Never Filed at: 03/04/2025 0307                            History of Present Illness       Chief Complaint   Patient presents with    Diarrhea     Pt with 4-5 episodes of diarrhea today. Pt has had sx for two days. Pt c/o lower abdominal pain. Pt also complains of tongue swelling, states she has had it ongoing since last stay in hospital. Pt later states her main concern is her tongue. States it is painful to eat.       Past Medical History:   Diagnosis Date    Acute embolism and thrombosis of unspecified deep veins of unspecified lower extremity (HCC)     Last Assessed:  5/18/17    Anemia     Anosmia     Anxiety     Arthritis     Asthma     Back pain     Bilateral macular retinal edema     CAD (coronary artery disease)     Cataract     Cervical disc herniation     Cervical radiculopathy     Cervical spinal stenosis     Cervical spondylolysis     Chronic kidney disease     Chronic mastoiditis     Colon polyp     Complex endometrial hyperplasia     Depression     Diabetes mellitus (HCC)     Disease of thyroid gland     DVT (deep venous thrombosis) (HCC)     DVT (deep venous thrombosis) (HCC) 06/16/2017    Fibromyalgia     Fibromyalgia, primary     Hyperlipidemia     Hypertension     Hypothyroid     Kidney stone     Lumbar radiculopathy     Neck pain     Obese     PONV (postoperative nausea and vomiting)     Shingles 07/01/2021    Spinal stenosis     Stomach ulcer     Thyroid disease     Toxic metabolic encephalopathy 02/11/2025    Vascular claudication (HCC) 12/11/2017      Past Surgical History:   Procedure Laterality Date    BACK SURGERY      CARPAL TUNNEL RELEASE      CATARACT EXTRACTION      CHOLECYSTECTOMY      COLONOSCOPY      CORONARY ANGIOPLASTY      CORONARY ARTERY BYPASS GRAFT      CYSTOSCOPY N/A 6/20/2017    Procedure: CYSTOSCOPY;  Surgeon: aTcho Arnold MD;  Location: [facility];  Service: Gynecology Oncology    CYSTOSCOPY      ME LAPS TOTAL HYSTERECT 250 GM/< W/RMVL TUBE/OVARY  N/A 2017    Procedure: ROBOTIC HYSTERECTOMY; BILATERAL SALPINO-OOPHERECTOMY; umbilical hernia repair.;  Surgeon: Tacho Arnold MD;  Location: [facility];  Service: Gynecology Oncology    VT REPAIR FIRST ABDOMINAL WALL HERNIA N/A 2022    Procedure: REPAIR HERNIA INCISIONAL;  Surgeon: Horacio Scherer DO;  Location: [facility];  Service: General    TONSILECTOMY AND ADNOIDECTOMY      TONSILLECTOMY      UMBILICAL HERNIA REPAIR        Family History   Problem Relation Age of Onset    Arthritis Mother     Leukemia Mother     Other Mother         Anxiety, major depressive disorder, recurrent episode with atypical features    Coronary artery disease Father         Heart problem    Diabetes Father     Other Father         Infectious disease    Alzheimer's disease Maternal Grandmother     Other Maternal Grandfather         Heart problem    Other Daughter         Anxiety, major depressive disorder, recurrent episode with atypical features    Alcohol abuse Other         Grandparent    Cancer Family     Diabetes Family     Hypertension Family     Other Family         Reported prior back trouble, thyroid disorder      Social History     Tobacco Use    Smoking status: Former     Current packs/day: 0.00     Average packs/day: 1 pack/day for 6.0 years (6.0 ttl pk-yrs)     Types: Cigarettes     Start date:      Quit date:      Years since quittin.2     Passive exposure: Never    Smokeless tobacco: Never    Tobacco comments:     Smoked about a half a pack for about 4 yrs.  Quite about 50 yrs ago.   Vaping Use    Vaping status: Never Used   Substance Use Topics    Alcohol use: Never    Drug use: No      E-Cigarette/Vaping    E-Cigarette Use Never User       E-Cigarette/Vaping Substances    Nicotine No     THC No     CBD No     Flavoring No     Other No     Unknown No       I have reviewed and agree with the history as documented.     79-year-old female with history of insulin-dependent type 2 diabetes, CAD  status post CABG, hyperlipidemia, ventral hernia, status post appendectomy and hysterectomy who presents with tongue dryness/irritation, abdominal pain, and diarrhea.  She reports 4-5 loose stools today without melena/hematochezia and generalized abdominal pain without nausea/vomiting, fever/chills, chest pain, shortness of breath, flank pain, dysuria/hematuria/frequency, vaginal bleeding/pain/itching/change in discharge, or any other concerns.      Diarrhea  Associated symptoms: abdominal pain    Associated symptoms: no arthralgias, no chills, no fever, no headaches and no vomiting        Review of Systems   Constitutional:  Negative for chills and fever.   HENT:  Negative for ear pain, mouth sores, sore throat and trouble swallowing.    Eyes:  Negative for pain and visual disturbance.   Respiratory:  Negative for cough and shortness of breath.    Cardiovascular:  Negative for chest pain and palpitations.   Gastrointestinal:  Positive for abdominal pain and diarrhea. Negative for blood in stool, constipation, nausea and vomiting.   Genitourinary:  Negative for dysuria and hematuria.   Musculoskeletal:  Negative for arthralgias and back pain.   Skin:  Negative for color change and rash.   Neurological:  Negative for dizziness, seizures, syncope, facial asymmetry, speech difficulty, weakness, light-headedness, numbness and headaches.   All other systems reviewed and are negative.          Objective       ED Triage Vitals [03/04/25 1304]   Temperature Pulse Blood Pressure Respirations SpO2 Patient Position - Orthostatic VS   98.2 °F (36.8 °C) 78 160/70 16 99 % Lying      Temp Source Heart Rate Source BP Location FiO2 (%) Pain Score    Oral Monitor Right arm -- --      Vitals      Date and Time Temp Pulse SpO2 Resp BP Pain Score FACES Pain Rating User   03/04/25 1539 -- -- -- -- 183/76 -- -- HR   03/04/25 1530 -- 93 96 % 20 188/126 -- -- CH   03/04/25 1500 -- 89 96 % -- 189/81 -- -- HR   03/04/25 1430 -- 77 100 % 20  174/74 -- -- CH   03/04/25 1330 -- 95 99 % 20 167/79 -- -- CS   03/04/25 1304 98.2 °F (36.8 °C) 78 99 % 16 160/70 -- -- CS            Physical Exam  Vitals and nursing note reviewed.   Constitutional:       General: She is not in acute distress.     Appearance: Normal appearance. She is normal weight. She is ill-appearing. She is not toxic-appearing.   HENT:      Head: Normocephalic.      Nose: Nose normal.      Mouth/Throat:      Mouth: Mucous membranes are dry.      Pharynx: Oropharynx is clear. No oropharyngeal exudate or posterior oropharyngeal erythema.      Comments: No oropharyngeal lesions  Eyes:      Conjunctiva/sclera: Conjunctivae normal.   Cardiovascular:      Rate and Rhythm: Normal rate and regular rhythm.      Heart sounds: Normal heart sounds.   Pulmonary:      Breath sounds: Normal breath sounds. No wheezing, rhonchi or rales.      Comments: Tachypnea with mild increased work of breathing on room air  Abdominal:      General: Abdomen is flat. There is no distension.      Palpations: Abdomen is soft.      Tenderness: There is abdominal tenderness. There is no right CVA tenderness, left CVA tenderness, guarding or rebound.      Hernia: A hernia is present.      Comments: Large ventral hernia that is reducible.  Generalized tenderness to palpation without rebound or guarding.   Skin:     General: Skin is warm and dry.      Capillary Refill: Capillary refill takes more than 3 seconds.   Neurological:      Mental Status: She is alert and oriented to person, place, and time.   Psychiatric:         Mood and Affect: Mood normal.         Results Reviewed       Procedure Component Value Units Date/Time    HS Troponin 0hr (reflex protocol) [[id number]]  (Normal) Collected: 03/04/25 1437    Lab Status: Final result Specimen: Blood from Arm, Right Updated: 03/04/25 1607     hs TnI 0hr 5 ng/L     RBC Morphology Reflex Test [[id number]] Collected: 03/04/25 1357    Lab Status: Final result Specimen: Blood from Arm,  Right Updated: 03/04/25 1602    Fingerstick Glucose (POCT) [[id number]]  (Abnormal) Collected: 03/04/25 1534    Lab Status: Final result Specimen: Blood Updated: 03/04/25 1535     POC Glucose 141 mg/dl     CBC and differential [[id number]]  (Normal) Collected: 03/04/25 1357    Lab Status: Final result Specimen: Blood from Arm, Right Updated: 03/04/25 1510     WBC 10.10 Thousand/uL      RBC 4.33 Million/uL      Hemoglobin 11.9 g/dL      Hematocrit 37.6 %      MCV 87 fL      MCH 27.5 pg      MCHC 31.6 g/dL      RDW 14.7 %      MPV 10.7 fL      Platelets 323 Thousands/uL     Narrative:      This is an appended report.  These results have been appended to a previously verified report.    Manual Differential(PHLEBS Do Not Order) [[id number]]  (Abnormal) Collected: 03/04/25 1357    Lab Status: Final result Specimen: Blood from Arm, Right Updated: 03/04/25 1510     Segmented % 55 %      Lymphocytes % 30 %      Monocytes % 11 %      Eosinophils % 0 %      Basophils % 2 %      Atypical Lymphocytes % 2 %      Absolute Neutrophils 5.56 Thousand/uL      Absolute Lymphocytes 3.23 Thousand/uL      Absolute Monocytes 1.11 Thousand/uL      Absolute Eosinophils 0.00 Thousand/uL      Absolute Basophils 0.20 Thousand/uL      Total Counted --     RBC Morphology Normal     Platelet Estimate Adequate    Magnesium [[id number]]  (Abnormal) Collected: 03/04/25 1437    Lab Status: Final result Specimen: Blood from Arm, Right Updated: 03/04/25 1505     Magnesium 1.7 mg/dL     Phosphorus [[id number]]  (Abnormal) Collected: 03/04/25 1437    Lab Status: Final result Specimen: Blood from Arm, Right Updated: 03/04/25 1505     Phosphorus 2.1 mg/dL     Comprehensive metabolic panel [[id number]]  (Abnormal) Collected: 03/04/25 1437    Lab Status: Final result Specimen: Blood from Arm, Right Updated: 03/04/25 1505     Sodium 138 mmol/L      Potassium 4.5 mmol/L      Chloride 104 mmol/L      CO2 23 mmol/L      ANION GAP 11 mmol/L      BUN 17 mg/dL       Creatinine 1.36 mg/dL      Glucose 193 mg/dL      Calcium 9.7 mg/dL      AST 20 U/L      ALT 15 U/L      Alkaline Phosphatase 106 U/L      Total Protein 8.0 g/dL      Albumin 3.8 g/dL      Total Bilirubin 0.61 mg/dL      eGFR 37 ml/min/1.73sq m     Narrative:      National [employer] guidelines for Chronic Kidney Disease (CKD):     Stage 1 with normal or high GFR (GFR > 90 mL/min/1.73 square meters)    Stage 2 Mild CKD (GFR = 60-89 mL/min/1.73 square meters)    Stage 3A Moderate CKD (GFR = 45-59 mL/min/1.73 square meters)    Stage 3B Moderate CKD (GFR = 30-44 mL/min/1.73 square meters)    Stage 4 Severe CKD (GFR = 15-29 mL/min/1.73 square meters)    Stage 5 End Stage CKD (GFR <15 mL/min/1.73 square meters)  Note: GFR calculation is accurate only with a steady state creatinine    Lipase [[id number]]  (Normal) Collected: 03/04/25 1437    Lab Status: Final result Specimen: Blood from Arm, Right Updated: 03/04/25 1505     Lipase 19 u/L     Beta Hydroxybutyrate [[id number]]  (Normal) Collected: 03/04/25 1437    Lab Status: Final result Specimen: Blood from Arm, Right Updated: 03/04/25 1505     Beta- Hydroxybutyrate 0.06 mmol/L     Lactic acid, plasma (w/reflex if result > 2.0) [[id number]]  (Normal) Collected: 03/04/25 1357    Lab Status: Final result Specimen: Blood from Arm, Right Updated: 03/04/25 1428     LACTIC ACID 1.7 mmol/L     Narrative:      Result may be elevated if tourniquet was used during collection.    Fingerstick Glucose (POCT) [[id number]]  (Abnormal) Collected: 03/04/25 1415    Lab Status: Final result Specimen: Blood Updated: 03/04/25 1416     POC Glucose 181 mg/dl     Blood gas, venous [[id number]]  (Abnormal) Collected: 03/04/25 1357    Lab Status: Final result Specimen: Blood from Arm, Right Updated: 03/04/25 1413     pH, Dae 7.573     pCO2, Dae 24.1 mm Hg      pO2, Dae 46.2 mm Hg      HCO3, Dae 21.7 mmol/L      Base Excess, Dae 1.3 mmol/L      O2 Content, Dea 16.1 ml/dL      O2 HGB,  "VENOUS 85.5 %     Fingerstick Glucose (POCT) [[id number]]  (Abnormal) Collected: 25 1332    Lab Status: Final result Specimen: Blood Updated: 25 1333     POC Glucose 202 mg/dl             CT abdomen pelvis with contrast   Final Interpretation by Abhijit Mondragon MD ( 1610)      1.  No identifiable acute abnormality to account for the patient's clinical presentation.      2.  Please refer to the report body for description of other incidental, chronic and/or benign findings.         Workstation performed: [id number]             Procedures    ED Medication and Procedure Management   Prior to Admission Medications   Prescriptions Last Dose Informant Patient Reported? Taking?   ALPRAZolam (XANAX) 0.5 mg tablet   No No   Sig: Take 1 tablet (0.5 mg total) by mouth daily as needed for anxiety   B-D UF III MINI PEN NEEDLES 31G X 5 MM MISC  Self Yes No   BD Insulin Syringe U/F 31G X 5/16\" 0.5 ML MISC  Self Yes No   Si (four) times a day As directed   Coenzyme Q10 (Co Q-10) 200 MG CAPS  Self Yes No   Sig: Take by mouth in the morning   Continuous Blood Gluc Sensor (Dexcom G6 Sensor) MISC  Self No No   Sig: Use 1 each daily at bedtime as needed (scalp itching)   Continuous Glucose Sensor (Dexcom G7 Sensor)   No No   Sig: Use 1 Device every 10 days   Patient not taking: Reported on 2025   Toujeo SoloStar 300 units/mL CONCENTRATED U-300 injection pen (1-unit dial)  Self No No   Sig: Inject 62 Units under the skin in the morning   bisacodyl (DULCOLAX) 5 mg EC tablet   No No   Sig: Take 2 tablets (10 mg total) by mouth once for 1 dose   cholecalciferol (VITAMIN D3) 1,000 units tablet  Self Yes No   Sig: Take 2,000 Units by mouth daily   diltiazem (TIAZAC) 300 MG 24 hr capsule  Self No No   Sig: Take 1 capsule (300 mg total) by mouth daily Tiddylt   fluticasone (FLONASE) 50 mcg/act nasal spray   No No   Sig: SPRAY 2 SPRAYS INTO EACH NOSTRIL EVERY DAY   insulin aspart (NovoLOG FlexPen) 100 UNIT/ML " "injection pen  Self Yes No   Sig: Inject 18 Units under the skin 2 (two) times a day with meals   irbesartan (AVAPRO) 300 mg tablet   Yes No   Sig: take 1 tablet by mouth every day at night   isosorbide mononitrate (IMDUR) 60 mg 24 hr tablet  Self Yes No   levothyroxine 75 mcg tablet  Self Yes No   Sig: Take 75 mcg by mouth daily   polyethylene glycol (GOLYTELY) 4000 mL solution   No No   Sig: Take 4,000 mL by mouth once for 1 dose   rosuvastatin (CRESTOR) 20 MG tablet  Self No No   Sig: Take 1 tablet (20 mg total) by mouth every evening   torsemide (DEMADEX) 20 mg tablet  Self No No   Sig: TAKE 0.5 TABLETS (10 MG TOTAL) BY MOUTH AS NEEDED (TAKE UP TO 2 TIMES PER WEEK FOR LEG SWELLING AS NEEDED)   venlafaxine (EFFEXOR) 75 mg tablet   No No   Sig: Take 1 tablet (75 mg total) by mouth in the morning      Facility-Administered Medications: None     Discharge Medication List as of 3/4/2025  4:29 PM        CONTINUE these medications which have NOT CHANGED    Details   ALPRAZolam (XANAX) 0.5 mg tablet Take 1 tablet (0.5 mg total) by mouth daily as needed for anxiety, Starting Wed 2/19/2025, No Print      B-D UF III MINI PEN NEEDLES 31G X 5 MM MISC Starting Fri 6/5/2020, Historical Med      BD Insulin Syringe U/F 31G X 5/16\" 0.5 ML MISC 4 (four) times a day As directed, Starting Mon 8/22/2022, Historical Med      bisacodyl (DULCOLAX) 5 mg EC tablet Take 2 tablets (10 mg total) by mouth once for 1 dose, Starting Wed 12/11/2024, Normal      cholecalciferol (VITAMIN D3) 1,000 units tablet Take 2,000 Units by mouth daily, Historical Med      Coenzyme Q10 (Co Q-10) 200 MG CAPS Take by mouth in the morning, Historical Med      !! Continuous Blood Gluc Sensor (Dexcom G6 Sensor) MISC Use 1 each daily at bedtime as needed (scalp itching), Starting Tue 4/23/2024, Fill Later      !! Continuous Glucose Sensor (Dexcom G7 Sensor) Use 1 Device every 10 days, Starting Fri 1/31/2025, Normal      diltiazem (TIAZAC) 300 MG 24 hr capsule Take " 1 capsule (300 mg total) by mouth daily Tiddylt, Starting Mon 8/19/2024, Normal      fluticasone (FLONASE) 50 mcg/act nasal spray SPRAY 2 SPRAYS INTO EACH NOSTRIL EVERY DAY, Normal      insulin aspart (NovoLOG FlexPen) 100 UNIT/ML injection pen Inject 18 Units under the skin 2 (two) times a day with meals, Historical Med      irbesartan (AVAPRO) 300 mg tablet take 1 tablet by mouth every day at night, Historical Med      isosorbide mononitrate (IMDUR) 60 mg 24 hr tablet Historical Med      levothyroxine 75 mcg tablet Take 75 mcg by mouth daily, Starting Tue 11/7/2023, Historical Med      polyethylene glycol (GOLYTELY) 4000 mL solution Take 4,000 mL by mouth once for 1 dose, Starting Wed 12/11/2024, Normal      rosuvastatin (CRESTOR) 20 MG tablet Take 1 tablet (20 mg total) by mouth every evening, Starting Mon 8/19/2024, Normal      torsemide (DEMADEX) 20 mg tablet TAKE 0.5 TABLETS (10 MG TOTAL) BY MOUTH AS NEEDED (TAKE UP TO 2 TIMES PER WEEK FOR LEG SWELLING AS NEEDED), Starting Fri 5/31/2024, Normal      Toujeo SoloStar 300 units/mL CONCENTRATED U-300 injection pen (1-unit dial) Inject 62 Units under the skin in the morning, Starting Tue 4/23/2024, No Print      venlafaxine (EFFEXOR) 75 mg tablet Take 1 tablet (75 mg total) by mouth in the morning, Starting Tue 11/5/2024, Until Wed 2/19/2025, Normal       !! - Potential duplicate medications found. Please discuss with provider.        No discharge procedures on file.  ED SEPSIS DOCUMENTATION   Time reflects when diagnosis was documented in both MDM as applicable and the Disposition within this note       Time User Action Codes Description Comment    3/4/2025  4:26 PM Yolis Carrillo [E86.0] Dehydration     3/4/2025  4:26 PM Yolis Carrillo [R19.7] Diarrhea     3/4/2025  4:26 PM Yolis Carrillo [R45.89] Anxiety about health                  Emily Ag MD  03/06/25 7789

## 2025-03-04 NOTE — ED CARE HANDOFF
Emergency Department Sign Out Note        Sign out and transfer of care from Dr. Ag. See Separate Emergency Department note.     The patient, Yajaira Marsh, was evaluated by the previous provider for anxiety, dry tongue, diarrhea.    Workup Completed:  Labs and IVF complete    ED Course / Workup Pending (followup):  Ct scan pending - if negative for acute pathology will d/c home with supportive care and PCP follow up                Update: Patient CT scan negative for acute findings.  I reviewed the results with the patient.  She feels comfortable with being discharged home.  She does have an appointment scheduled with her PCP for recheck next week.                     Procedures  Medical Decision Making  Amount and/or Complexity of Data Reviewed  Labs: ordered.  Radiology: ordered.    Risk  Prescription drug management.            Disposition  Final diagnoses:   Dehydration   Diarrhea   Anxiety about health     Time reflects when diagnosis was documented in both MDM as applicable and the Disposition within this note       Time User Action Codes Description Comment    3/4/2025  4:26 PM Yolis Carrillo Add [E86.0] Dehydration     3/4/2025  4:26 PM Yolis Carrillo Add [R19.7] Diarrhea     3/4/2025  4:26 PM Yolis Carrillo Add [R45.89] Anxiety about health           ED Disposition       ED Disposition   Discharge    Condition   Stable    Date/Time   Tue Mar 4, 2025  4:26 PM    Comment   Yajaira Marsh discharge to home/self care.                   Follow-up Information       Follow up With Specialties Details Why Contact Info    Sukumar Clemens DO Internal Medicine Schedule an appointment as soon as possible for a visit in 2 days For recheck of current symptoms [address] [place] [address]  [phone]            Patient's Medications   Discharge Prescriptions    No medications on file     No discharge procedures on file.       ED Provider  Electronically Signed by     Yolis Carrillo DO  03/04/25 7201

## 2025-03-05 ENCOUNTER — NURSE TRIAGE (OUTPATIENT)
Age: 80
End: 2025-03-05

## 2025-03-05 ENCOUNTER — NURSE TRIAGE (OUTPATIENT)
Dept: OTHER | Facility: OTHER | Age: 80
End: 2025-03-05

## 2025-03-05 ENCOUNTER — HOSPITAL ENCOUNTER (EMERGENCY)
Facility: HOSPITAL | Age: 80
Discharge: HOME | End: 2025-03-06
Attending: EMERGENCY MEDICINE
Payer: COMMERCIAL

## 2025-03-05 DIAGNOSIS — Z13.9 ENCOUNTER FOR MEDICAL SCREENING EXAMINATION: ICD-10-CM

## 2025-03-05 DIAGNOSIS — Z79.4 INSULIN LONG-TERM USE (HCC): ICD-10-CM

## 2025-03-05 DIAGNOSIS — F41.9 ANXIETY: ICD-10-CM

## 2025-03-05 LAB
GLUCOSE SERPL-MCNC: 103 MG/DL (ref 65–140)
GLUCOSE SERPL-MCNC: 148 MG/DL (ref 65–140)
GLUCOSE SERPL-MCNC: 80 MG/DL (ref 65–140)

## 2025-03-05 PROCEDURE — 82948 REAGENT STRIP/BLOOD GLUCOSE: CPT

## 2025-03-05 PROCEDURE — 99284 EMERGENCY DEPT VISIT MOD MDM: CPT | Performed by: EMERGENCY MEDICINE

## 2025-03-05 PROCEDURE — 96372 THER/PROPH/DIAG INJ SC/IM: CPT

## 2025-03-05 PROCEDURE — 99283 EMERGENCY DEPT VISIT LOW MDM: CPT

## 2025-03-05 RX ORDER — LORAZEPAM 0.5 MG/1
0.5 TABLET ORAL ONCE
Status: DISCONTINUED | OUTPATIENT
Start: 2025-03-05 | End: 2025-03-05

## 2025-03-05 RX ORDER — LORAZEPAM 2 MG/ML
0.5 INJECTION INTRAMUSCULAR ONCE
Status: COMPLETED | OUTPATIENT
Start: 2025-03-05 | End: 2025-03-05

## 2025-03-05 RX ADMIN — LORAZEPAM 0.5 MG: 2 INJECTION INTRAMUSCULAR; INTRAVENOUS at 19:49

## 2025-03-05 NOTE — TELEPHONE ENCOUNTER
FOLLOW UP: Pt has called [facility]  and they are at her home to take her to the ER to further evaluate her potential lowering of her  BS after taking Toujeo and Novolog within 20 minutes of each other this evening. Current BS     REASON FOR CONVERSATION: Hypoglycemia (Hypoglycemia 91 concern because she took her Toujeo and Novolog. )    SYMPTOMS: Shaky, nervous    OTHER: none    DISPOSITION: No disposition on file. Pt has already called [facility] and they are there with her for transport to [facility].

## 2025-03-05 NOTE — TELEPHONE ENCOUNTER
"Answer Assessment - Initial Assessment Questions  1. SYMPTOMS: \"What symptoms are you concerned about?\"      Concern with blood sugar drop. She gave herself Toujeo and Novolog within 20 minutes of each other.   2. ONSET:  \"When did the symptoms start?\"      Within the last 30 minutes  3. BLOOD GLUCOSE: \"What is your blood glucose level?\"         4. USUAL RANGE: \"What is your blood glucose level usually?\" (e.g., usual fasting morning value, usual evening value)        5. TYPE 1 or 2:  \"Do you know what type of diabetes you have?\"  (e.g., Type 1, Type 2, Gestational; doesn't know)         6. INSULIN: \"Do you take insulin?\" \"What type of insulin(s) do you use? What is the mode of delivery? (syringe, pen; injection or pump) \"When did you last give yourself an insulin dose?\" (i.e., time or hours/minutes ago) \"How much did you give?\" (i.e., how many units)      Novolog and Toujeo  7. DIABETES PILLS: \"Do you take any pills for your diabetes?\" If Yes, ask: \"What is the name of the medicine(s) that you take for high blood sugar?\"        8. OTHER SYMPTOMS: \"Do you have any symptoms?\" (e.g., fever, frequent urination, difficulty breathing, vomiting)      Upset nervous and shaky  9. LOW BLOOD GLUCOSE TREATMENT: \"What have you done so far to treat the low blood glucose level?\"        10. FOOD: \"When did you last eat or drink?\"          11. ALONE: \"Are you alone right now or is someone with you?\"         No pt has EMS services at her home for transport.  12. PREGNANCY: \"Is there any chance you are pregnant?\" \"When was your last menstrual period?\"        no    Protocols used: Diabetes - Low Blood Sugar-Adult-AH    "

## 2025-03-05 NOTE — TELEPHONE ENCOUNTER
"Patient calls stating she is not feeling well.  She repeat this multiple times . Unable to get patient to verbalize her symptoms. She calls sating she does not feel well. She sounds SOB via phone.     Patient  present.  I spoke with the  who said the patient woke up this morning and has been very confused and the confusion is worsening.  He states this is a sudden change and  this is definite not her usual .        Patient is diabetic and sensor for dexcom not working so her BS is unknown right now.  Patient continue to say she does not feel good.  Did admit to dizziness.  Unsure if pt is feeling weak , numbness or tingling .  She states she feels \"loopy\".      Due to sudden onset of confusion and worsening I recommend ER evaluation now and call squad if necessary to get pt to hosp [facility].    agreed .  states he will get the patient to the ER now.        Reason for Disposition   Patient sounds very sick or weak to the triager    Answer Assessment - Initial Assessment Questions  1. LEVEL OF CONSCIOUSNESS: \"How are they (the patient) acting right now?\" (e.g., alert-oriented, confused, lethargic, stuporous, comatose)            Awake and alert but confused and frustrated       See my note      2. ONSET: \"When did the confusion start?\"  (e.g., minutes, hours, days)          Today         3. PATTERN: \"Does this come and go, or has it been constant since it started?\"  \"Is it present now?\"          Constant since she woke up      4. ALCOHOL or DRUGS: \"Have they been drinking alcohol or taking any drugs?\"          NO      5. NARCOTIC MEDICINES: \"Have they been receiving any narcotic medications?\" (e.g., morphine, Vicodin)       Unsure      6. CAUSE: \"What do you think is causing the confusion?\"           Unsure      7. OTHER SYMPTOMS: \"Are there any other symptoms?\" (e.g., difficulty breathing, fever, headache, weakness)          Sounds SOB via phone    Protocols used: Confusion - " Delirium-Adult-[place]

## 2025-03-05 NOTE — TELEPHONE ENCOUNTER
"Regarding: blood sugar 91  ----- Message from Dona ARMIJO sent at 3/5/2025  6:28 PM EST -----  \"Im very concerned my blood sugar is 91 and I called the ambulance before calling you guys\"    "

## 2025-03-05 NOTE — TELEPHONE ENCOUNTER
"FOLLOW UP:     REASON FOR CONVERSATION: Dizziness, Altered Mental Status, and Confusion    SYMPTOMS: Patient calls having difficulty verbalizing her symptoms. Repeats multiple times \"I just do not feel well\" .  Loopy dizzy.  Spoke with  who states patient has sudden onset of confusion and worsening.  Not her baseline.  Unable to check BS . Dexcom sensor needs replacement.     OTHER: In ER yesterday and recently admitted for pneumonia.    DISPOSITION: Go to ED Now (overriding Go to ED/UCC Now (Or to Office with PCP Approval))  Patient refused disposition.    "

## 2025-03-06 VITALS
OXYGEN SATURATION: 96 % | DIASTOLIC BLOOD PRESSURE: 78 MMHG | SYSTOLIC BLOOD PRESSURE: 186 MMHG | TEMPERATURE: 98.3 F | HEART RATE: 99 BPM | RESPIRATION RATE: 18 BRPM

## 2025-03-06 LAB
ATRIAL RATE: 77 BPM
GLUCOSE SERPL-MCNC: 102 MG/DL (ref 65–140)
P AXIS: 63 DEGREES
PR INTERVAL: 158 MS
QRS AXIS: -20 DEGREES
QRSD INTERVAL: 82 MS
QT INTERVAL: 402 MS
QTC INTERVAL: 454 MS
T WAVE AXIS: -13 DEGREES
VENTRICULAR RATE: 77 BPM

## 2025-03-06 PROCEDURE — 82948 REAGENT STRIP/BLOOD GLUCOSE: CPT

## 2025-03-06 PROCEDURE — 96372 THER/PROPH/DIAG INJ SC/IM: CPT

## 2025-03-06 RX ORDER — LIDOCAINE 50 MG/G
1 PATCH TOPICAL ONCE
Status: DISCONTINUED | OUTPATIENT
Start: 2025-03-06 | End: 2025-03-06 | Stop reason: HOSPADM

## 2025-03-06 RX ORDER — LORAZEPAM 2 MG/ML
0.5 INJECTION INTRAMUSCULAR ONCE
Status: COMPLETED | OUTPATIENT
Start: 2025-03-06 | End: 2025-03-06

## 2025-03-06 RX ORDER — ACETAMINOPHEN 325 MG/1
650 TABLET ORAL ONCE
Status: DISCONTINUED | OUTPATIENT
Start: 2025-03-06 | End: 2025-03-06 | Stop reason: HOSPADM

## 2025-03-06 RX ADMIN — LIDOCAINE 1 PATCH: 700 PATCH TOPICAL at 04:45

## 2025-03-06 RX ADMIN — LORAZEPAM 0.5 MG: 2 INJECTION INTRAMUSCULAR; INTRAVENOUS at 05:32

## 2025-03-06 NOTE — ED NOTES
Patient requested we call her  to pick her up once d/c, Nurse attempt to contact  with no success. Patient does not have house key and unsure if house in unlocked, Pt's  will try to be contacted later.     Galina Leal RN  03/06/25 9962

## 2025-03-06 NOTE — ED PROVIDER NOTES
Time reflects when diagnosis was documented in both MDM as applicable and the Disposition within this note       Time User Action Codes Description Comment    3/5/2025  7:22 PM Deshawn Kearns Add [Z13.9] Encounter for medical screening examination     3/5/2025  7:22 PM Deshawn Kearns Add [E11.9] Type 2 diabetes mellitus without complication, with no history of insulin use (HCC)     3/5/2025  7:22 PM Deshawn Kearns Remove [E11.9] Type 2 diabetes mellitus without complication, with no history of insulin use (HCC)     3/5/2025  7:23 PM Som Deshawn Add [Z79.4] Insulin long-term use (HCC)     3/5/2025  7:23 PM Calvinanita Deshawn Modify [Z13.9] Encounter for medical screening examination     3/5/2025  8:04 PM Deshawn Kearns Add [F41.9] Anxiety     3/5/2025  8:04 PM Deshawn Kearns [F41.9] Anxiety           ED Disposition       ED Disposition   Discharge    Condition   Stable    Date/Time   Wed Mar 5, 2025 11:58 PM    Comment   Yajaira Marsh discharge to home/self care.                   Assessment & Plan       Medical Decision Making  Patient is a 79-year-old female seen in the emergency department with concern for possible low glucose in the setting of taking her home insulin this evening before eating dinner.  Fingerstick blood glucose was 80, within normal limits.  Patient tolerated food in the emergency department.  Patient was treated with medication for anxiety control.  Patient was monitored in the emergency department, without episode of hypoglycemia noted.  Plan to have patient follow up with PCP/outpatient providers.  Patient stable for discharge home.  Discharge instructions were reviewed with patient.    Problems Addressed:  Anxiety: acute illness or injury  Encounter for medical screening examination: acute illness or injury    Amount and/or Complexity of Data Reviewed  Labs: ordered. Decision-making details documented in ED Course.  ECG/medicine tests: ordered. Decision-making details documented in  ED Course.    Risk  OTC drugs.  Prescription drug management.             Medications   acetaminophen (TYLENOL) tablet 650 mg (has no administration in time range)   lidocaine (LIDODERM) 5 % patch 1 patch (1 patch Topical Medication Applied 3/6/25 0445)   LORazepam (ATIVAN) injection 0.5 mg (has no administration in time range)   LORazepam (ATIVAN) injection 0.5 mg (0.5 mg Intramuscular Given 3/5/25 1949)       ED Risk Strat Scores                            SBIRT 22yo+      Flowsheet Row Most Recent Value   Initial Alcohol Screen: US AUDIT-C     1. How often do you have a drink containing alcohol? 0 Filed at: 03/05/2025 1908   2. How many drinks containing alcohol do you have on a typical day you are drinking?  0 Filed at: 03/05/2025 1908   3b. FEMALE Any Age, or MALE 65+: How often do you have 4 or more drinks on one occassion? 0 Filed at: 03/05/2025 1908   Audit-C Score 0 Filed at: 03/05/2025 1908   GERMAN: How many times in the past year have you...    Used an illegal drug or used a prescription medication for non-medical reasons? Never Filed at: 03/05/2025 1908                            History of Present Illness       Chief Complaint   Patient presents with    Medical Problem     EMS reports patient taking insulin due to being anxious. BS= 80. Patient unsure of what time insulin was taken. (-) N/V/DIZZINESS/CP/SOB. Patient reports that her tongue feel numb and has a headache.        Past Medical History:   Diagnosis Date    Acute embolism and thrombosis of unspecified deep veins of unspecified lower extremity (HCC)     Last Assessed:  5/18/17    Anemia     Anosmia     Anxiety     Arthritis     Asthma     Back pain     Bilateral macular retinal edema     CAD (coronary artery disease)     Cataract     Cervical disc herniation     Cervical radiculopathy     Cervical spinal stenosis     Cervical spondylolysis     Chronic kidney disease     Chronic mastoiditis     Colon polyp     Complex endometrial hyperplasia      Depression     Diabetes mellitus (HCC)     Disease of thyroid gland     DVT (deep venous thrombosis) (HCC)     DVT (deep venous thrombosis) ([facility]) 06/16/2017    Fibromyalgia     Fibromyalgia, primary     Hyperlipidemia     Hypertension     Hypothyroid     Kidney stone     Lumbar radiculopathy     Neck pain     Obese     PONV (postoperative nausea and vomiting)     Shingles 07/01/2021    Spinal stenosis     Stomach ulcer     Thyroid disease     Toxic metabolic encephalopathy 02/11/2025    Vascular claudication ([facility]) 12/11/2017      Past Surgical History:   Procedure Laterality Date    BACK SURGERY      CARPAL TUNNEL RELEASE      CATARACT EXTRACTION      CHOLECYSTECTOMY      COLONOSCOPY      CORONARY ANGIOPLASTY      CORONARY ARTERY BYPASS GRAFT      CYSTOSCOPY N/A 6/20/2017    Procedure: CYSTOSCOPY;  Surgeon: Tacho Arnold MD;  Location: [facility];  Service: Gynecology Oncology    CYSTOSCOPY      MO LAPS TOTAL HYSTERECT 250 GM/< W/RMVL TUBE/OVARY N/A 6/20/2017    Procedure: ROBOTIC HYSTERECTOMY; BILATERAL SALPINO-OOPHERECTOMY; umbilical hernia repair.;  Surgeon: Tacho Arnold MD;  Location: [facility];  Service: Gynecology Oncology    MO REPAIR FIRST ABDOMINAL WALL HERNIA N/A 5/12/2022    Procedure: REPAIR HERNIA INCISIONAL;  Surgeon: Horacio Scherer DO;  Location: [facility];  Service: General    TONSILECTOMY AND ADNOIDECTOMY      TONSILLECTOMY      UMBILICAL HERNIA REPAIR        Family History   Problem Relation Age of Onset    Arthritis Mother     Leukemia Mother     Other Mother         Anxiety, major depressive disorder, recurrent episode with atypical features    Coronary artery disease Father         Heart problem    Diabetes Father     Other Father         Infectious disease    Alzheimer's disease Maternal Grandmother     Other Maternal Grandfather         Heart problem    Other Daughter         Anxiety, major depressive disorder, recurrent episode with atypical features    Alcohol abuse Other          Grandparent    Cancer Family     Diabetes Family     Hypertension Family     Other Family         Reported prior back trouble, thyroid disorder      Social History     Tobacco Use    Smoking status: Former     Current packs/day: 0.00     Average packs/day: 1 pack/day for 6.0 years (6.0 ttl pk-yrs)     Types: Cigarettes     Start date:      Quit date:      Years since quittin.2     Passive exposure: Never    Smokeless tobacco: Never    Tobacco comments:     Smoked about a half a pack for about 4 yrs.  Quite about 50 yrs ago.   Vaping Use    Vaping status: Never Used   Substance Use Topics    Alcohol use: Never    Drug use: No      E-Cigarette/Vaping    E-Cigarette Use Never User       E-Cigarette/Vaping Substances    Nicotine No     THC No     CBD No     Flavoring No     Other No     Unknown No       I have reviewed and agree with the history as documented.     Patient is a 79-year-old female seen in the emergency department with concern for possible low glucose in the setting of taking her home Toujeo and NovoLog this evening.  Patient states that she took these medications approximately 2 hours ago with a plan to eat dinner with her .  Patient stated that she was not hungry, so declined to eat after taking the medication.  Patient denies suicidal ideation or any attempt at self-harm.  Patient notes mild headache.  Patient refuses any laboratory evaluation, but states that she will eat a meal in the emergency department.  Review of record shows that the patient was seen in [place] emergency department 1 day ago with multiple complaints.        Review of Systems   Constitutional:  Negative for chills and fever.   HENT:  Negative for ear pain and sore throat.    Eyes:  Negative for pain and visual disturbance.   Respiratory:  Negative for cough and shortness of breath.    Cardiovascular:  Negative for chest pain and palpitations.   Gastrointestinal:  Negative for abdominal pain and vomiting.    Genitourinary:  Negative for dysuria and hematuria.   Musculoskeletal:  Negative for arthralgias and back pain.   Skin:  Negative for color change and rash.   Neurological:  Positive for headaches. Negative for seizures and syncope.   Psychiatric/Behavioral:  Negative for agitation. The patient is nervous/anxious.    All other systems reviewed and are negative.          Objective       ED Triage Vitals   Temperature Pulse Blood Pressure Respirations SpO2 Patient Position - Orthostatic VS   03/05/25 1906 03/05/25 1906 03/05/25 1906 03/05/25 1906 03/05/25 1906 03/05/25 1906   98.3 °F (36.8 °C) 93 (!) 188/77 18 93 % Lying      Temp Source Heart Rate Source BP Location FiO2 (%) Pain Score    03/05/25 1906 03/05/25 1906 03/05/25 1906 -- 03/06/25 0113    Oral Monitor Right arm  No Pain      Vitals      Date and Time Temp Pulse SpO2 Resp BP Pain Score FACES Pain Rating User   03/06/25 0305 -- -- -- -- -- No Pain -- [facility]   03/06/25 0300 -- 99 96 % 18 186/78 -- -- [facility]   03/06/25 0113 -- -- -- -- -- No Pain -- [facility]   03/05/25 2215 -- 99 100 % 18 189/84 -- -- [facility]   03/05/25 1906 98.3 °F (36.8 °C) 93 93 % 18 188/77 -- -- [facility]            Physical Exam  Vitals and nursing note reviewed.   Constitutional:       Appearance: She is well-developed. She is not diaphoretic.   HENT:      Head: Normocephalic and atraumatic.      Right Ear: External ear normal.      Left Ear: External ear normal.      Nose: Nose normal.      Mouth/Throat:      Pharynx: Oropharynx is clear.   Eyes:      General: No scleral icterus.     Conjunctiva/sclera: Conjunctivae normal.   Cardiovascular:      Rate and Rhythm: Normal rate and regular rhythm.      Heart sounds: No murmur heard.  Pulmonary:      Effort: Pulmonary effort is normal. No respiratory distress.      Breath sounds: Normal breath sounds.   Abdominal:      General: There is no distension.      Palpations: Abdomen is soft.      Tenderness: There is no abdominal tenderness.   Musculoskeletal:          "General: No deformity or signs of injury.      Cervical back: Normal range of motion and neck supple.   Skin:     General: Skin is warm and dry.   Neurological:      General: No focal deficit present.      Mental Status: She is alert.      Cranial Nerves: No cranial nerve deficit.      Sensory: No sensory deficit.      Motor: No weakness.   Psychiatric:         Thought Content: Thought content normal.      Comments: Appears anxious, no homicidal or suicidal ideation         Results Reviewed       Procedure Component Value Units Date/Time    Fingerstick Glucose (POCT) [[id number]]  (Normal) Collected: 25    Lab Status: Final result Specimen: Blood Updated: 25     POC Glucose 102 mg/dl     Fingerstick Glucose (POCT) [[id number]]  (Abnormal) Collected: 25    Lab Status: Final result Specimen: Blood Updated: 25     POC Glucose 148 mg/dl     Fingerstick Glucose (POCT) [[id number]]  (Normal) Collected: 25    Lab Status: Final result Specimen: Blood Updated: 25     POC Glucose 103 mg/dl     Fingerstick Glucose (POCT) [[id number]]  (Normal) Collected: 25    Lab Status: Final result Specimen: Blood Updated: 25     POC Glucose 80 mg/dl             No orders to display       Procedures    ED Medication and Procedure Management   Prior to Admission Medications   Prescriptions Last Dose Informant Patient Reported? Taking?   ALPRAZolam (XANAX) 0.5 mg tablet   No No   Sig: Take 1 tablet (0.5 mg total) by mouth daily as needed for anxiety   B-D UF III MINI PEN NEEDLES 31G X 5 MM MISC  Self Yes No   BD Insulin Syringe U/F 31G X 5/16\" 0.5 ML MISC  Self Yes No   Si (four) times a day As directed   Coenzyme Q10 (Co Q-10) 200 MG CAPS  Self Yes No   Sig: Take by mouth in the morning   Continuous Blood Gluc Sensor (Dexcom G6 Sensor) MISC  Self No No   Sig: Use 1 each daily at bedtime as needed (scalp itching)   Continuous Glucose Sensor (Dexcom G7 " Sensor)   No No   Sig: Use 1 Device every 10 days   Patient not taking: Reported on 2/19/2025   Toujeo SoloStar 300 units/mL CONCENTRATED U-300 injection pen (1-unit dial)  Self No No   Sig: Inject 62 Units under the skin in the morning   bisacodyl (DULCOLAX) 5 mg EC tablet   No No   Sig: Take 2 tablets (10 mg total) by mouth once for 1 dose   cholecalciferol (VITAMIN D3) 1,000 units tablet  Self Yes No   Sig: Take 2,000 Units by mouth daily   diltiazem (TIAZAC) 300 MG 24 hr capsule  Self No No   Sig: Take 1 capsule (300 mg total) by mouth daily Tiddylt   fluticasone (FLONASE) 50 mcg/act nasal spray   No No   Sig: SPRAY 2 SPRAYS INTO EACH NOSTRIL EVERY DAY   insulin aspart (NovoLOG FlexPen) 100 UNIT/ML injection pen  Self Yes No   Sig: Inject 18 Units under the skin 2 (two) times a day with meals   irbesartan (AVAPRO) 300 mg tablet   Yes No   Sig: take 1 tablet by mouth every day at night   isosorbide mononitrate (IMDUR) 60 mg 24 hr tablet  Self Yes No   levothyroxine 75 mcg tablet  Self Yes No   Sig: Take 75 mcg by mouth daily   polyethylene glycol (GOLYTELY) 4000 mL solution   No No   Sig: Take 4,000 mL by mouth once for 1 dose   rosuvastatin (CRESTOR) 20 MG tablet  Self No No   Sig: Take 1 tablet (20 mg total) by mouth every evening   torsemide (DEMADEX) 20 mg tablet  Self No No   Sig: TAKE 0.5 TABLETS (10 MG TOTAL) BY MOUTH AS NEEDED (TAKE UP TO 2 TIMES PER WEEK FOR LEG SWELLING AS NEEDED)   venlafaxine (EFFEXOR) 75 mg tablet   No No   Sig: Take 1 tablet (75 mg total) by mouth in the morning      Facility-Administered Medications: None     Patient's Medications   Discharge Prescriptions    No medications on file     No discharge procedures on file.  ED SEPSIS DOCUMENTATION   Time reflects when diagnosis was documented in both MDM as applicable and the Disposition within this note       Time User Action Codes Description Comment    3/5/2025  7:22 PM Deshawn Kearns Add [Z13.9] Encounter for medical screening  examination     3/5/2025  7:22 PM Deshawn Kearns [E11.9] Type 2 diabetes mellitus without complication, with no history of insulin use (HCC)     3/5/2025  7:22 PM Deshawn Kearns Remove [E11.9] Type 2 diabetes mellitus without complication, with no history of insulin use (HCC)     3/5/2025  7:23 PM Deshawn Kearns [Z79.4] Insulin long-term use (HCC)     3/5/2025  7:23 PM Deshawn Kearns [Z13.9] Encounter for medical screening examination     3/5/2025  8:04 PM Deshawn Kearns [F41.9] Anxiety     3/5/2025  8:04 PM Deshawn Kearns [F41.9] Anxiety                  Deshawn Kearns MD  03/06/25 0007       Deshawn Kearns MD  03/06/25 0441       Deshawn Kearns MD  03/06/25 0524

## 2025-03-06 NOTE — ED NOTES
Pt complaining of chronic neck pain, pt also states she's uncomfortable sitting in bed, pt was offered and moved to recliner for patient comfort .     Galina Leal RN  03/06/25 0686

## 2025-03-06 NOTE — ED NOTES
Patient uncomfortable patient moved from recliner to regular chair.      Nani Mccarty  03/06/25 0701

## 2025-03-06 NOTE — ED NOTES
Patient  called multiple times per patient request. Patient  answered phone.  unable to  patient. Patient set up with wheelchair van for  to go home.      Nani Mccarty  03/06/25 0704

## 2025-03-08 ENCOUNTER — DOCUMENTATION (OUTPATIENT)
Dept: GERIATRICS | Facility: OTHER | Age: 80
End: 2025-03-08

## 2025-03-08 ENCOUNTER — TELEPHONE (OUTPATIENT)
Dept: OTHER | Facility: OTHER | Age: 80
End: 2025-03-08

## 2025-03-08 NOTE — TELEPHONE ENCOUNTER
Dipti from [facility] called reporting new admission and needing to verify orders    On call notified via epic chat

## 2025-03-10 ENCOUNTER — NURSING HOME VISIT (OUTPATIENT)
Dept: GERIATRICS | Facility: OTHER | Age: 80
End: 2025-03-10
Payer: COMMERCIAL

## 2025-03-10 ENCOUNTER — TELEPHONE (OUTPATIENT)
Age: 80
End: 2025-03-10

## 2025-03-10 DIAGNOSIS — I10 ESSENTIAL HYPERTENSION: ICD-10-CM

## 2025-03-10 DIAGNOSIS — F33.2 MDD (MAJOR DEPRESSIVE DISORDER), RECURRENT SEVERE, WITHOUT PSYCHOSIS (HCC): ICD-10-CM

## 2025-03-10 DIAGNOSIS — F41.9 ANXIETY: ICD-10-CM

## 2025-03-10 DIAGNOSIS — Z79.4 TYPE 2 DIABETES MELLITUS WITH COMPLICATION, WITH LONG-TERM CURRENT USE OF INSULIN (HCC): Primary | ICD-10-CM

## 2025-03-10 DIAGNOSIS — E11.8 TYPE 2 DIABETES MELLITUS WITH COMPLICATION, WITH LONG-TERM CURRENT USE OF INSULIN (HCC): Primary | ICD-10-CM

## 2025-03-10 DIAGNOSIS — R53.1 GENERALIZED WEAKNESS: ICD-10-CM

## 2025-03-10 DIAGNOSIS — E03.9 HYPOTHYROIDISM, UNSPECIFIED TYPE: Chronic | ICD-10-CM

## 2025-03-10 PROCEDURE — 99306 1ST NF CARE HIGH MDM 50: CPT | Performed by: FAMILY MEDICINE

## 2025-03-10 NOTE — PROGRESS NOTES
[facility]  [address]  [place] [address]   [facility]  History and Physical    NAME: Yajaira Marsh  AGE: 79 y.o. SEX: female [id number]    DATE OF ENCOUNTER: 3/11/2025    Code status:  CPR    Assessment and Plan     1. Type 2 diabetes mellitus with complication, with long-term current use of insulin (HCC) (Primary)  - with hypoglycemia  - she is not eating well and taking insulin  - diabetic education  - cont insulin aspart 2 u sc qac  - cont Glargine 5 u sc qhs  - cont accuchecks as ordered                                   3/10/2025 21:21 140.0 mg/dL       3/10/2025 17:16 211.0 mg/dL       3/10/2025 12:35 291.0 mg/dL       3/10/2025 07:31 188.0 mg/dL       3/9/2025 21:52 212.0 mg/dL       3/9/2025 17:38 202.0 mg/dL       3/9/2025 14:25 318.0 mg/dL       3/9/2025 08:05 169.0 mg/dL      - HbA1c: 11.1 (3/7/25)    2. Anxiety  - stable  - cont Alprazolam 0.50 mg po bid prn    3. Hypothyroidism, unspecified type  - stable  - cont levothyroxine 75 mcg po qam    4. MDD (major depressive disorder), recurrent severe, without psychosis (HCC)  - stable  - cont Effexor XR 37.5 mg po qd    5. Generalized weakness  - PT/OT ordered  - Fall precautions in place    6. Primary Hypertension:   - controlled  - cont Diltiazem 300 mg po qd  - cont Losartan 100 mg po qd  - cont Isosorbide mononitrate 90 mg po qd  - cont Atorvastatin 40 mg po qhs  - cont ASA 81 mg po qd    All medications and routine orders were reviewed and updated as needed.    Plan discussed with: Patient    Chief Complaint     Seen for admission at Nursing Facility    History of Present Illness     Yajaira Marsh, a 80 y/o female with PMH of DM2, HTN, depression, Anxiety, hypothyroidism, CAD got admitted to [facility] with hypoglycemia. The day before she went to [facility] with the same complaint.  In ED her blood sugar level was 54, she admits taking short acting insulin even she doesn't eat. She treated with  dextrose.Her recent HbA1c was 11.1%. In the hospital, she didn't need much insulin. Her overall condition is improving, got discharged to [facility] for subacute rehab.  She was seen and examined, stable. She found sitting next to her bed. She is verbal, able to give good history. She lives at home with . She uses walker at home. She is independent of ADLs and IADLs.   She wants to go home, her  is sick, she is walking fine here.   Staff says she asked multiple times to go to home.     HISTORY:  Past Medical History:   Diagnosis Date   • Acute embolism and thrombosis of unspecified deep veins of unspecified lower extremity (HCC)     Last Assessed:  5/18/17   • Anemia    • Anosmia    • Anxiety    • Arthritis    • Asthma    • Back pain    • Bilateral macular retinal edema    • CAD (coronary artery disease)    • Cataract    • Cervical disc herniation    • Cervical radiculopathy    • Cervical spinal stenosis    • Cervical spondylolysis    • Chronic kidney disease    • Chronic mastoiditis    • Colon polyp    • Complex endometrial hyperplasia    • Depression    • Diabetes mellitus ([facility])    • Disease of thyroid gland    • DVT (deep venous thrombosis) ([facility])    • DVT (deep venous thrombosis) ([facility]) 06/16/2017   • Fibromyalgia    • Fibromyalgia, primary    • Hyperlipidemia    • Hypertension    • Hypothyroid    • Kidney stone    • Lumbar radiculopathy    • Neck pain    • Obese    • PONV (postoperative nausea and vomiting)    • Shingles 07/01/2021   • Spinal stenosis    • Stomach ulcer    • Thyroid disease    • Toxic metabolic encephalopathy 02/11/2025   • Vascular claudication ([facility]) 12/11/2017     Family History   Problem Relation Age of Onset   • Arthritis Mother    • Leukemia Mother    • Other Mother         Anxiety, major depressive disorder, recurrent episode with atypical features   • Coronary artery disease Father         Heart problem   • Diabetes Father    • Other Father         Infectious disease   • Alzheimer's  disease Maternal Grandmother    • Other Maternal Grandfather         Heart problem   • Other Daughter         Anxiety, major depressive disorder, recurrent episode with atypical features   • Alcohol abuse Other         Grandparent   • Cancer Family    • Diabetes Family    • Hypertension Family    • Other Family         Reported prior back trouble, thyroid disorder     Social History     Socioeconomic History   • Marital status: /Civil Union     Spouse name: Not on file   • Number of children: 2   • Years of education: High school or GED   • Highest education level: Not on file   Occupational History   • Occupation: Retired   Tobacco Use   • Smoking status: Former     Current packs/day: 0.00     Average packs/day: 1 pack/day for 6.0 years (6.0 ttl pk-yrs)     Types: Cigarettes     Start date:      Quit date:      Years since quittin.2     Passive exposure: Never   • Smokeless tobacco: Never   • Tobacco comments:     Smoked about a half a pack for about 4 yrs.  Quite about 50 yrs ago.   Vaping Use   • Vaping status: Never Used   Substance and Sexual Activity   • Alcohol use: Never   • Drug use: No   • Sexual activity: Not Currently     Comment: No known STD risk factors   Other Topics Concern   • Not on file   Social History Narrative    Lives independently with spouse    No known risk factors    Denied:  Exercising regularly     Social Drivers of Health     Financial Resource Strain: Not At Risk (3/6/2025)    Received from [facility]    Financial Insecurity    • In the last 12 months did you skip medications to save money?: No    • In the last 12 months was there a time when you needed to see a doctor but could not because of cost?: No   Food Insecurity: No Food Insecurity (3/6/2025)    Received from [facility]    Food Insecurity    • In the last 12 months did you ever eat less than you felt you should because there wasn't enough money for food?: No    Transportation Needs: No Transportation Needs (3/6/2025)    Received from [facility]    Transportation Needs    • In the last 12 months have you ever had to go without healthcare because you didn't have a way to get there?: No   Physical Activity: Not on file   Stress: Not on file   Social Connections: Socially Integrated (3/6/2025)    Received from [facility]    Social Connection    • Do you often feel lonely?: No   Intimate Partner Violence: Unknown (2/11/2025)    Nursing IPS    • Feels Physically and Emotionally Safe: Not on file    • Physically Hurt by Someone: Not on file    • Humiliated or Emotionally Abused by Someone: Not on file    • Physically Hurt by Someone: No    • Hurt or Threatened by Someone: No   Housing Stability: Not At Risk (3/6/2025)    Received from [facility]    Housing Stability    • Are you worried that in the next 2 months you may not have stable housing?: No       Allergies:  Allergies   Allergen Reactions   • Molds & Smuts Allergic Rhinitis   • Other Allergic Rhinitis     RAGWEED, CAT DANDER, DOG DANDER    • Pollen Extract Other (See Comments)     Cold symptoms         Review of Systems     Review of Systems   Constitutional:  Negative for activity change, fatigue and fever.   HENT:  Positive for hearing loss. Negative for dental problem and trouble swallowing.    Eyes:  Negative for photophobia and visual disturbance.   Respiratory:  Negative for cough and shortness of breath.    Cardiovascular:  Negative for chest pain, palpitations and leg swelling.   Gastrointestinal:  Negative for abdominal pain, constipation, diarrhea, nausea and vomiting.   Genitourinary:  Negative for difficulty urinating and dysuria.   Musculoskeletal:  Positive for gait problem. Negative for arthralgias.   Neurological:  Positive for weakness. Negative for dizziness and headaches.   As in HPI.    Medications and orders     All medications reviewed and  updated in Nursing Home EMR.      Objective     Vitals: T: 97.8, P: 70, R: 16, BP: 116/80, 94% on RA, Wt: 169.8 lbs    Physical Exam  Vitals and nursing note reviewed.   Constitutional:       General: She is not in acute distress.     Appearance: Normal appearance. She is well-developed. She is not diaphoretic.   HENT:      Head: Normocephalic and atraumatic.      Nose: Nose normal.      Mouth/Throat:      Mouth: Mucous membranes are moist.      Pharynx: Oropharynx is clear. No oropharyngeal exudate.   Eyes:      General: No scleral icterus.        Right eye: No discharge.         Left eye: No discharge.      Extraocular Movements: Extraocular movements intact.      Conjunctiva/sclera: Conjunctivae normal.   Cardiovascular:      Rate and Rhythm: Normal rate and regular rhythm.      Heart sounds: Normal heart sounds. No murmur heard.  Pulmonary:      Effort: Pulmonary effort is normal. No respiratory distress.      Breath sounds: Normal breath sounds. No wheezing.   Chest:      Chest wall: No tenderness.   Abdominal:      General: Bowel sounds are normal.      Palpations: Abdomen is soft.      Tenderness: There is no abdominal tenderness. There is no guarding or rebound.   Musculoskeletal:         General: No tenderness or deformity. Normal range of motion.      Cervical back: Normal range of motion and neck supple.      Right lower leg: No edema.      Left lower leg: No edema.   Skin:     General: Skin is warm and dry.   Neurological:      Mental Status: She is alert and oriented to person, place, and time.      Cranial Nerves: No cranial nerve deficit.   Psychiatric:         Mood and Affect: Mood normal.         Behavior: Behavior normal.         Pertinent Laboratory/Diagnostic Studies:   The following labs/studies were reviewed please see chart or hospital paperwork for details.  Ref Range & Units 3/7/25 0557   Hemoglobin  11.5 - 14.5 g/dL 11 Low    Hematocrit  35.0 - 43.0 % 33.3 Low    WBC  4.0 - 10.0 thou/cmm  8.2   RBC  3.70 - 4.70 mill/cmm 3.96   Platelet  140 - 350 thou/cmm 245   MPV  7.5 - 11.3 fL 8.4   MCV  80 - 100 fL 84   MCH  26.0 - 34.0 pg 27.8   MCHC  32.0 - 37.0 g/dL 33.1   RDW  12.0 - 16.0 % 16   Differential Type AUTO   Absolute Neutrophils  1.8 - 7.8 thou/cmm 4.4   Absolute Lymphocytes  1.0 - 3.0 thou/cmm 2.9   Absolute Monocytes  0.3 - 1.0 thou/cmm 0.7   Absolute Eosinophils  0.0 - 0.5 thou/cmm 0.1   Absolute Basophils  0.0 - 0.1 thou/cmm 0.1   Neutrophils  % 54   Lymphocytes Manual  % 35   Monocytes  % 9   Eosinophils  % 1     Ref Range & Units (hover) 3/7/25 0557 3/6/25 2034 3/4/25 1437 2/15/25 0512 2/14/25 0630 2/13/25 0000 2/12/25 0550   Glucose 108 High  54 Low  193 High  R,  R,  High  R,  High  R,  High  R, CM   BUN 15 17 17 R 13 R 14 R 12 R 19 R   Creatinine 1.46 High  1.55 High  1.36 High  R, CM 1.23 R, CM 1.30 R, CM 1.15 R, CM 1.29 R, CM   Sodium 141 140 138 R 135 R 134 Low  R 134 Low  R 135 R   Potassium 3.8 4.7 CM 4.5 R 4.0 R 4.1 R 3.8 R 4.0 R   Chloride 109 105 104 R 104 R 104 R 103 R 105 R   Carbon Dioxide 22 24 23 R 22 R 23 R 23 R 23 R   Calcium 9.1 10.1 9.7 R 8.7 R 8.8 R 9.1 R 9.1 R   Alkaline Phosphatase 89 105 106 High  R       ALBUMIN 3.5 4.1 3.8 R       Total Bilirubin 0.5 0.6 CM 0.61 R, CM       Comment: Eltrombopag and its metabolites may interfere with this assay causing erroneously high patient results.   Protein, Total 7.1 8.6 High  8.0 R       AST 19 30 CM 20 R       ALT 12 15 15 R, CM       ANION GAP 10 11 11 R 9 R 7 R 8 R 7 R   eGFRcr 37 Low  34 Low  37 R 42 R 39 R 45 R 39 R       - Counseling Documentation: patient was counseled regarding: risk factor reductions and prognosis

## 2025-03-10 NOTE — TELEPHONE ENCOUNTER
Patient called in to cancel pharmacy appointment today with Johana. Patient stated she is currently at a rehab facility and cannot make it today as she is unsure what time she has therapy. Patient stated she wants to keep appointment with Dr. Clemens for tomorrow 3/10.    Please advise, thank you

## 2025-03-11 PROBLEM — I10 ESSENTIAL HYPERTENSION: Status: ACTIVE | Noted: 2025-03-11

## 2025-03-11 PROBLEM — R53.1 GENERALIZED WEAKNESS: Status: ACTIVE | Noted: 2025-03-11

## 2025-03-12 ENCOUNTER — NURSING HOME VISIT (OUTPATIENT)
Dept: GERIATRICS | Facility: OTHER | Age: 80
End: 2025-03-12
Payer: COMMERCIAL

## 2025-03-12 VITALS
BODY MASS INDEX: 31.06 KG/M2 | HEART RATE: 76 BPM | OXYGEN SATURATION: 96 % | TEMPERATURE: 97.6 F | RESPIRATION RATE: 16 BRPM | WEIGHT: 169.8 LBS | SYSTOLIC BLOOD PRESSURE: 132 MMHG | DIASTOLIC BLOOD PRESSURE: 78 MMHG

## 2025-03-12 DIAGNOSIS — F41.0 PANIC ATTACKS: ICD-10-CM

## 2025-03-12 DIAGNOSIS — E03.9 HYPOTHYROIDISM, UNSPECIFIED TYPE: ICD-10-CM

## 2025-03-12 DIAGNOSIS — F41.9 ANXIETY: ICD-10-CM

## 2025-03-12 DIAGNOSIS — Z79.4 TYPE 2 DIABETES MELLITUS WITH COMPLICATION, WITH LONG-TERM CURRENT USE OF INSULIN (HCC): Primary | ICD-10-CM

## 2025-03-12 DIAGNOSIS — F41.1 GAD (GENERALIZED ANXIETY DISORDER): ICD-10-CM

## 2025-03-12 DIAGNOSIS — E11.8 TYPE 2 DIABETES MELLITUS WITH COMPLICATION, WITH LONG-TERM CURRENT USE OF INSULIN (HCC): Primary | ICD-10-CM

## 2025-03-12 DIAGNOSIS — I10 ESSENTIAL HYPERTENSION: ICD-10-CM

## 2025-03-12 DIAGNOSIS — N18.4 STAGE 4 CHRONIC KIDNEY DISEASE (HCC): ICD-10-CM

## 2025-03-12 DIAGNOSIS — I25.10 CORONARY ARTERY DISEASE INVOLVING NATIVE CORONARY ARTERY OF NATIVE HEART WITHOUT ANGINA PECTORIS: Chronic | ICD-10-CM

## 2025-03-12 DIAGNOSIS — R26.2 AMBULATORY DYSFUNCTION: ICD-10-CM

## 2025-03-12 PROCEDURE — 99316 NF DSCHRG MGMT 30 MIN+: CPT

## 2025-03-12 NOTE — ASSESSMENT & PLAN NOTE
Lab Results   Component Value Date    EGFR 37 (L) 03/07/2025    EGFR 34 (L) 03/06/2025    EGFR 37 03/04/2025    CREATININE 1.46 (H) 03/07/2025    CREATININE 1.55 (H) 03/06/2025    CREATININE 1.36 (H) 03/04/2025   Baseline Cr 1.5-1.7  Avoid nephrotoxins  Encourage adequate PO hydration  Monitor kidney functions

## 2025-03-12 NOTE — LETTER
March 13, 2025     Sukumar Clemens DO  [address] [place] [id number]    Patient: Yajaira Marsh   YOB: 1945   Date of Visit: 3/12/2025       Dear Dr. Clemens:    Yajaira Marsh is discharging from [facility] 3/13/25 at her request. Please arrange for follow up visit.  Sincerely,        ALAN Finch        CC: No Recipients    ALAN Finch  3/13/2025  9:38 AM  Addendum  [facility]  [address] [address]  [phone]  [facility]  [facility] (STR)  DISCHARGE SUMMARY        NAME: Yajaira Marsh  AGE: 79 y.o. SEX: female CODE STATUS: CPR    DATE OF ENCOUNTER: 3/13/2025  DATE OF DISCHARGE 3/13/2025  PATIENT is stable for discharge to home with family support and [facility]    Assessment and Plan     1. Type 2 diabetes mellitus with complication, with long-term current use of insulin (HCC)  Assessment & Plan:  Per geriatric guidelines, goal HgA1c is > 7.5 to prevent hypoglycemic event in the older adult with cognitive decline   Lab Results   Component Value Date    HGBA1C 11.1 (H) 03/07/2025   Morning   Continue accu-cheks  Continue Aspart 2 units TID with meals  Continue Toujeo 5 units daily  Encourage diabetic diet  Avoid hypoglycemia  2. Essential hypertension  Assessment & Plan:  /79  Continue to monitor BP  Continue Diltiazem 300 mg daily  Continue Losartan 100 mg daily  Continue Isosorbide 90 mg daily  3. Coronary artery disease involving native coronary artery of native heart without angina pectoris  Assessment & Plan:  Continue ASA 81 mg  Continue Atorvastatin 40 mg daily  4. Stage 4 chronic kidney disease (HCC)  Assessment & Plan:  Lab Results   Component Value Date    EGFR 37 (L) 03/07/2025    EGFR 34 (L) 03/06/2025    EGFR 37 03/04/2025    CREATININE 1.46 (H) 03/07/2025    CREATININE 1.55 (H) 03/06/2025    CREATININE 1.36 (H) 03/04/2025   Baseline Cr 1.5-1.7  Avoid  nephrotoxins  Encourage adequate PO hydration  Monitor kidney functions  5. Ambulatory dysfunction  Assessment & Plan:  Multifactorial in setting of chronic medical conditions  Continue PT/OT  Fall Precautions  Ensure adequate nutrition/hydration   Monitor CBC/BMP    following for d/c planning    6. Anxiety  7. Panic attacks  8. MADAN (generalized anxiety disorder)  9. Hypothyroidism, unspecified type       All medications and routine orders were reviewed and updated as needed.    Chief Complaint     STR follow up visit  Patient's care was coordinated with nursing facility staff. Recent vitals, labs, and updated medications were review on [facility] system in facility.  Past Medical and Surgical History      Past Medical History:   Diagnosis Date   • Acute embolism and thrombosis of unspecified deep veins of unspecified lower extremity (HCC)     Last Assessed:  5/18/17   • Anemia    • Anosmia    • Anxiety    • Arthritis    • Asthma    • Back pain    • Bilateral macular retinal edema    • CAD (coronary artery disease)    • Cataract    • Cervical disc herniation    • Cervical radiculopathy    • Cervical spinal stenosis    • Cervical spondylolysis    • Chronic kidney disease    • Chronic mastoiditis    • Colon polyp    • Complex endometrial hyperplasia    • Depression    • Diabetes mellitus (HCC)    • Disease of thyroid gland    • DVT (deep venous thrombosis) ([facility])    • DVT (deep venous thrombosis) ([facility]) 06/16/2017   • Fibromyalgia    • Fibromyalgia, primary    • Hyperlipidemia    • Hypertension    • Hypothyroid    • Kidney stone    • Lumbar radiculopathy    • Neck pain    • Obese    • PONV (postoperative nausea and vomiting)    • Shingles 07/01/2021   • Spinal stenosis    • Stomach ulcer    • Thyroid disease    • Toxic metabolic encephalopathy 02/11/2025   • Vascular claudication ([facility]) 12/11/2017     Past Surgical History:   Procedure Laterality Date   • BACK SURGERY     • CARPAL TUNNEL RELEASE      • CATARACT EXTRACTION     • CHOLECYSTECTOMY     • COLONOSCOPY     • CORONARY ANGIOPLASTY     • CORONARY ARTERY BYPASS GRAFT     • CYSTOSCOPY N/A 6/20/2017    Procedure: CYSTOSCOPY;  Surgeon: Tacho Arnold MD;  Location: [facility];  Service: Gynecology Oncology   • CYSTOSCOPY     • LA LAPS TOTAL HYSTERECT 250 GM/< W/RMVL TUBE/OVARY N/A 6/20/2017    Procedure: ROBOTIC HYSTERECTOMY; BILATERAL SALPINO-OOPHERECTOMY; umbilical hernia repair.;  Surgeon: Tacho Arnold MD;  Location: [facility];  Service: Gynecology Oncology   • LA REPAIR FIRST ABDOMINAL WALL HERNIA N/A 5/12/2022    Procedure: REPAIR HERNIA INCISIONAL;  Surgeon: Horacio Scherer DO;  Location: [facility];  Service: General   • TONSILECTOMY AND ADNOIDECTOMY     • TONSILLECTOMY     • UMBILICAL HERNIA REPAIR       Allergies   Allergen Reactions   • Molds & Smuts Allergic Rhinitis   • Other Allergic Rhinitis     RAGWEED, CAT DANDER, DOG DANDER    • Pollen Extract Other (See Comments)     Cold symptoms            History of Present Illness     HPI  Yajaira Marsh is a 79 year old female, she is a STR patient of [facility] since 3/8/2025. Past Medical Hx including but not limited to CAD, SARAY, DM, neuropathy, CKD, MDD, Kidney mass, MADAN, fibromyalgia seen in collaboration with nursing for medical mgmt and STR follow up.     Hospital Course  Patient was admitted to [facility] on 3/6/25 for hypoglycemia.  Patient presented to [facility] ER 3/5 for hypoglycemia when BS dropped to 80 after taking her insulin and she developed a HA and numbness of her tongue. She had difficulty ambulating d/t chronic pain. Her BS stabilized and she was discharged home with PT/OT referral. At home she says she did not eat much and took her Toujeo, her dexcom alerted her that her BS was dropping and she got nervous and called an ambulance. In the ER her BS was 76, she received 2 doses of dextrose, BS had been labile.  Following admission her BS was 161. PRN Zyprexa was  started for agitation/confusion. Patient was recommended for rehab and was discharged to [facility].   Rehab Course  Yajaira was seen and examined at bedside today. She is AAO x 3. On exam she is sitting  in her chair, in no distress. Her  is visiting.  She says she does not know why she is here, she is at her baseline with regards to mobility.  She denies pain, difficulty sleeping and has a good appetite.  She c/o insomnia which has been ongoing for several years and is managed by her PCP. She say she has an appointment with a new endocrinologist next week. She is asking to discharge home.  SS following.    Denies CP/SOB/N/V/D. Denies lightheadedness, dizziness, headaches, vision changes. Patient states they are eating well and staying hydrated. Denies any bowel or bladder issues. Per review of SNF records, Patient is eating 3 meals per day, consuming  %. Last documented BM 3/11/25. No concerns from nursing at this time.    The patient's allergies, past medical, surgical, social and family history were reviewed and unchanged.    Review of Systems     Review of Systems   Constitutional:  Negative for activity change, appetite change and fever.   HENT:  Negative for congestion.    Respiratory:  Negative for cough, shortness of breath and wheezing.    Cardiovascular:  Negative for chest pain, palpitations and leg swelling.   Gastrointestinal:  Negative for constipation, diarrhea, nausea and vomiting.   Genitourinary:  Negative for difficulty urinating and dysuria.   Musculoskeletal:  Positive for arthralgias and gait problem.   Skin: Negative.    Neurological:  Negative for dizziness, weakness and headaches.   Hematological: Negative.    Psychiatric/Behavioral:  Positive for sleep disturbance. Negative for confusion.          Objective     Vitals:   Vitals:    03/12/25 1129   BP: 132/78   Pulse: 76   Resp: 16   Temp: 97.6 °F (36.4 °C)   SpO2: 96%         Physical Exam  Vitals and nursing note  reviewed.   Constitutional:       General: She is not in acute distress.     Appearance: Normal appearance. She is not ill-appearing.   HENT:      Head: Normocephalic and atraumatic.   Eyes:      Conjunctiva/sclera: Conjunctivae normal.   Cardiovascular:      Rate and Rhythm: Normal rate and regular rhythm.      Heart sounds: Normal heart sounds.   Pulmonary:      Effort: Pulmonary effort is normal. No respiratory distress.      Breath sounds: Normal breath sounds. No wheezing.   Abdominal:      General: Bowel sounds are normal. There is no distension.      Palpations: Abdomen is soft.      Tenderness: There is no abdominal tenderness.   Musculoskeletal:      Right lower leg: No edema.      Left lower leg: No edema.   Skin:     General: Skin is warm.   Neurological:      Mental Status: She is alert and oriented to person, place, and time.      Motor: No weakness.      Gait: Gait normal.   Psychiatric:         Mood and Affect: Mood normal.         Behavior: Behavior normal.         Pertinent Laboratory/Diagnostic Studies:   Reviewed in facility chart-stable      Current Medications   Medications reviewed and updated see facility MAR for details.      Current Outpatient Medications:   •  aspirin 81 mg chewable tablet, Chew 81 mg daily, Disp: , Rfl:   •  cholecalciferol (VITAMIN D3) 1,000 units tablet, Take 2,000 Units by mouth daily, Disp: , Rfl:   •  Coenzyme Q10 (Co Q-10) 200 MG CAPS, Take by mouth in the morning, Disp: , Rfl:   •  diltiazem (TIAZAC) 300 MG 24 hr capsule, Take 1 capsule (300 mg total) by mouth daily Tiddylt, Disp: 90 capsule, Rfl: 0  •  insulin aspart (NovoLOG FlexPen) 100 UNIT/ML injection pen, Inject 2 Units under the skin 3 (three) times a day with meals, Disp: , Rfl:   •  isosorbide mononitrate (IMDUR) 30 mg 24 hr tablet, Take 90 mg by mouth daily, Disp: , Rfl:   •  levothyroxine 75 mcg tablet, Take 75 mcg by mouth daily, Disp: , Rfl:   •  losartan (COZAAR) 100 MG tablet, Take 25 mg by mouth  "daily, Disp: , Rfl:   •  rosuvastatin (CRESTOR) 20 MG tablet, Take 1 tablet (20 mg total) by mouth every evening, Disp: 90 tablet, Rfl: 1  •  venlafaxine (EFFEXOR-XR) 37.5 mg 24 hr capsule, Take 37.5 mg by mouth daily, Disp: , Rfl:   •  ALPRAZolam (XANAX) 0.5 mg tablet, Take 1 tablet (0.5 mg total) by mouth daily as needed for anxiety, Disp: , Rfl:   •  torsemide (DEMADEX) 20 mg tablet, TAKE 0.5 TABLETS (10 MG TOTAL) BY MOUTH AS NEEDED (TAKE UP TO 2 TIMES PER WEEK FOR LEG SWELLING AS NEEDED), Disp: 45 tablet, Rfl: 1  •  Toujeo SoloStar 300 units/mL CONCENTRATED U-300 injection pen (1-unit dial), Inject 62 Units under the skin in the morning, Disp: , Rfl:      Please note:  Voice-recognition software may have been used in the preparation of this document.  Occasional wrong word or \"sound-alike\" substitutions may have occurred due to the inherent limitations of voice recognition software.  Interpretation should be guided by context.         ALAN Finch  3/13/2025  9:31 AM    "

## 2025-03-12 NOTE — ASSESSMENT & PLAN NOTE
Per geriatric guidelines, goal HgA1c is > 7.5 to prevent hypoglycemic event in the older adult with cognitive decline   Lab Results   Component Value Date    HGBA1C 11.1 (H) 03/07/2025   Morning   Continue accu-cheks  Continue Aspart 2 units TID with meals  Continue Toujeo 5 units daily  Encourage diabetic diet  Avoid hypoglycemia

## 2025-03-12 NOTE — PROGRESS NOTES
[facility]  [address] [address] [phone]  [facility]  Code 31 (STR)  DISCHARGE SUMMARY        NAME: Yajaira Marsh  AGE: 79 y.o. SEX: female CODE STATUS: CPR    DATE OF ENCOUNTER: 3/13/2025  DATE OF DISCHARGE 3/13/2025  PATIENT is stable for discharge to home with family support and [facility]    Assessment and Plan     1. Type 2 diabetes mellitus with complication, with long-term current use of insulin (HCC)  Assessment & Plan:  Per geriatric guidelines, goal HgA1c is > 7.5 to prevent hypoglycemic event in the older adult with cognitive decline   Lab Results   Component Value Date    HGBA1C 11.1 (H) 03/07/2025   Morning   Continue accu-cheks  Continue Aspart 2 units TID with meals  Continue Toujeo 5 units daily  Encourage diabetic diet  Avoid hypoglycemia  2. Essential hypertension  Assessment & Plan:  /78  Continue to monitor BP  Continue Diltiazem 300 mg daily  Continue Losartan 100 mg daily  Continue Isosorbide 90 mg daily  3. Coronary artery disease involving native coronary artery of native heart without angina pectoris  Assessment & Plan:  Continue ASA 81 mg  Continue Atorvastatin 40 mg daily  4. Stage 4 chronic kidney disease (HCC)  Assessment & Plan:  Lab Results   Component Value Date    EGFR 37 (L) 03/07/2025    EGFR 34 (L) 03/06/2025    EGFR 37 03/04/2025    CREATININE 1.46 (H) 03/07/2025    CREATININE 1.55 (H) 03/06/2025    CREATININE 1.36 (H) 03/04/2025   Baseline Cr 1.5-1.7  Avoid nephrotoxins  Encourage adequate PO hydration  Monitor kidney functions  5. Ambulatory dysfunction  Assessment & Plan:  Multifactorial in setting of chronic medical conditions  Continue PT/OT  Fall Precautions  Ensure adequate nutrition/hydration   Monitor CBC/BMP    following for d/c planning    6. Anxiety  7. Panic attacks  8. MADAN (generalized anxiety disorder)  9. Hypothyroidism, unspecified type       All medications and routine orders were reviewed  and updated as needed.    Chief Complaint     STR follow up visit  Patient's care was coordinated with nursing facility staff. Recent vitals, labs, and updated medications were review on [facility] system in facility.  Past Medical and Surgical History      Past Medical History:   Diagnosis Date    Acute embolism and thrombosis of unspecified deep veins of unspecified lower extremity (HCC)     Last Assessed:  5/18/17    Anemia     Anosmia     Anxiety     Arthritis     Asthma     Back pain     Bilateral macular retinal edema     CAD (coronary artery disease)     Cataract     Cervical disc herniation     Cervical radiculopathy     Cervical spinal stenosis     Cervical spondylolysis     Chronic kidney disease     Chronic mastoiditis     Colon polyp     Complex endometrial hyperplasia     Depression     Diabetes mellitus (HCC)     Disease of thyroid gland     DVT (deep venous thrombosis) ([facility])     DVT (deep venous thrombosis) ([facility]) 06/16/2017    Fibromyalgia     Fibromyalgia, primary     Hyperlipidemia     Hypertension     Hypothyroid     Kidney stone     Lumbar radiculopathy     Neck pain     Obese     PONV (postoperative nausea and vomiting)     Shingles 07/01/2021    Spinal stenosis     Stomach ulcer     Thyroid disease     Toxic metabolic encephalopathy 02/11/2025    Vascular claudication (Formerly [facility]) 12/11/2017     Past Surgical History:   Procedure Laterality Date    BACK SURGERY      CARPAL TUNNEL RELEASE      CATARACT EXTRACTION      CHOLECYSTECTOMY      COLONOSCOPY      CORONARY ANGIOPLASTY      CORONARY ARTERY BYPASS GRAFT      CYSTOSCOPY N/A 6/20/2017    Procedure: CYSTOSCOPY;  Surgeon: Tacho Arnold MD;  Location: [facility];  Service: Gynecology Oncology    CYSTOSCOPY      NH LAPS TOTAL HYSTERECT 250 GM/< W/RMVL TUBE/OVARY N/A 6/20/2017    Procedure: ROBOTIC HYSTERECTOMY; BILATERAL SALPINO-OOPHERECTOMY; umbilical hernia repair.;  Surgeon: Tacho Arnold MD;  Location: [facility];  Service:  Gynecology Oncology    RI REPAIR FIRST ABDOMINAL WALL HERNIA N/A 5/12/2022    Procedure: REPAIR HERNIA INCISIONAL;  Surgeon: Horacio Scherer DO;  Location: [facility];  Service: General    TONSILECTOMY AND ADNOIDECTOMY      TONSILLECTOMY      UMBILICAL HERNIA REPAIR       Allergies   Allergen Reactions    Molds & Smuts Allergic Rhinitis    Other Allergic Rhinitis     RAGWEED, CAT DANDER, DOG DANDER     Pollen Extract Other (See Comments)     Cold symptoms            History of Present Illness     HPI  Yajaira Marsh is a 79 year old female, she is a STR patient of [facility] since 3/8/2025. Past Medical Hx including but not limited to CAD, SARAY, DM, neuropathy, CKD, MDD, Kidney mass, MADAN, fibromyalgia seen in collaboration with nursing for medical mgmt and STR follow up.     Hospital Course  Patient was admitted to [facility] on 3/6/25 for hypoglycemia.  Patient presented to [facility] ER 3/5 for hypoglycemia when BS dropped to 80 after taking her insulin and she developed a HA and numbness of her tongue. She had difficulty ambulating d/t chronic pain. Her BS stabilized and she was discharged home with PT/OT referral. At home she says she did not eat much and took her Toujeo, her dexcom alerted her that her BS was dropping and she got nervous and called an ambulance. In the ER her BS was 76, she received 2 doses of dextrose, BS had been labile.  Following admission her BS was 161. PRN Zyprexa was started for agitation/confusion. Patient was recommended for rehab and was discharged to [facility].   Rehab Course  Yajaira was seen and examined at bedside today. She is AAO x 3. On exam she is sitting  in her chair, in no distress. Her  is visiting.  She says she does not know why she is here, she is at her baseline with regards to mobility.  She denies pain, difficulty sleeping and has a good appetite.  She c/o insomnia which has been ongoing for several years and is managed by her PCP. She say  she has an appointment with a new endocrinologist next week. She is asking to discharge home.  SS following.    Denies CP/SOB/N/V/D. Denies lightheadedness, dizziness, headaches, vision changes. Patient states they are eating well and staying hydrated. Denies any bowel or bladder issues. Per review of SNF records, Patient is eating 3 meals per day, consuming  %. Last documented BM 3/11/25. No concerns from nursing at this time.    The patient's allergies, past medical, surgical, social and family history were reviewed and unchanged.    Review of Systems     Review of Systems   Constitutional:  Negative for activity change, appetite change and fever.   HENT:  Negative for congestion.    Respiratory:  Negative for cough, shortness of breath and wheezing.    Cardiovascular:  Negative for chest pain, palpitations and leg swelling.   Gastrointestinal:  Negative for constipation, diarrhea, nausea and vomiting.   Genitourinary:  Negative for difficulty urinating and dysuria.   Musculoskeletal:  Positive for arthralgias and gait problem.   Skin: Negative.    Neurological:  Negative for dizziness, weakness and headaches.   Hematological: Negative.    Psychiatric/Behavioral:  Positive for sleep disturbance. Negative for confusion.          Objective     Vitals:   Vitals:    03/12/25 1129   BP: 132/78   Pulse: 76   Resp: 16   Temp: 97.6 °F (36.4 °C)   SpO2: 96%         Physical Exam  Vitals and nursing note reviewed.   Constitutional:       General: She is not in acute distress.     Appearance: Normal appearance. She is not ill-appearing.   HENT:      Head: Normocephalic and atraumatic.   Eyes:      Conjunctiva/sclera: Conjunctivae normal.   Cardiovascular:      Rate and Rhythm: Normal rate and regular rhythm.      Heart sounds: Normal heart sounds.   Pulmonary:      Effort: Pulmonary effort is normal. No respiratory distress.      Breath sounds: Normal breath sounds. No wheezing.   Abdominal:      General: Bowel sounds  are normal. There is no distension.      Palpations: Abdomen is soft.      Tenderness: There is no abdominal tenderness.   Musculoskeletal:      Right lower leg: No edema.      Left lower leg: No edema.   Skin:     General: Skin is warm.   Neurological:      Mental Status: She is alert and oriented to person, place, and time.      Motor: No weakness.      Gait: Gait normal.   Psychiatric:         Mood and Affect: Mood normal.         Behavior: Behavior normal.         Pertinent Laboratory/Diagnostic Studies:   Reviewed in facility chart-stable      Current Medications   Medications reviewed and updated see facility MAR for details.      Current Outpatient Medications:     aspirin 81 mg chewable tablet, Chew 81 mg daily, Disp: , Rfl:     cholecalciferol (VITAMIN D3) 1,000 units tablet, Take 2,000 Units by mouth daily, Disp: , Rfl:     Coenzyme Q10 (Co Q-10) 200 MG CAPS, Take by mouth in the morning, Disp: , Rfl:     diltiazem (TIAZAC) 300 MG 24 hr capsule, Take 1 capsule (300 mg total) by mouth daily Tiddylt, Disp: 90 capsule, Rfl: 0    insulin aspart (NovoLOG FlexPen) 100 UNIT/ML injection pen, Inject 2 Units under the skin 3 (three) times a day with meals, Disp: , Rfl:     isosorbide mononitrate (IMDUR) 30 mg 24 hr tablet, Take 90 mg by mouth daily, Disp: , Rfl:     levothyroxine 75 mcg tablet, Take 75 mcg by mouth daily, Disp: , Rfl:     losartan (COZAAR) 100 MG tablet, Take 25 mg by mouth daily, Disp: , Rfl:     rosuvastatin (CRESTOR) 20 MG tablet, Take 1 tablet (20 mg total) by mouth every evening, Disp: 90 tablet, Rfl: 1    venlafaxine (EFFEXOR-XR) 37.5 mg 24 hr capsule, Take 37.5 mg by mouth daily, Disp: , Rfl:     ALPRAZolam (XANAX) 0.5 mg tablet, Take 1 tablet (0.5 mg total) by mouth daily as needed for anxiety, Disp: , Rfl:     torsemide (DEMADEX) 20 mg tablet, TAKE 0.5 TABLETS (10 MG TOTAL) BY MOUTH AS NEEDED (TAKE UP TO 2 TIMES PER WEEK FOR LEG SWELLING AS NEEDED), Disp: 45 tablet, Rfl: 1    Toujeo  "SoloStar 300 units/mL CONCENTRATED U-300 injection pen (1-unit dial), Inject 62 Units under the skin in the morning, Disp: , Rfl:      Please note:  Voice-recognition software may have been used in the preparation of this document.  Occasional wrong word or \"sound-alike\" substitutions may have occurred due to the inherent limitations of voice recognition software.  Interpretation should be guided by context.         ALAN Finch  3/13/2025  9:40 AM    "

## 2025-03-12 NOTE — ASSESSMENT & PLAN NOTE
/78  Continue to monitor BP  Continue Diltiazem 300 mg daily  Continue Losartan 100 mg daily  Continue Isosorbide 90 mg daily

## 2025-03-12 NOTE — ASSESSMENT & PLAN NOTE
Multifactorial in setting of chronic medical conditions  Continue PT/OT  Fall Precautions  Ensure adequate nutrition/hydration   Monitor CBC/BMP    following for d/c planning

## 2025-03-13 ENCOUNTER — TRANSITIONAL CARE MANAGEMENT (OUTPATIENT)
Dept: INTERNAL MEDICINE CLINIC | Facility: CLINIC | Age: 80
End: 2025-03-13

## 2025-03-13 RX ORDER — ALPRAZOLAM 0.5 MG
0.5 TABLET ORAL EVERY 12 HOURS PRN
Start: 2025-03-13 | End: 2025-03-20

## 2025-03-13 RX ORDER — INSULIN GLARGINE 300 U/ML
5 INJECTION, SOLUTION SUBCUTANEOUS DAILY
Start: 2025-03-13 | End: 2025-03-18 | Stop reason: SDUPTHER

## 2025-03-13 RX ORDER — VENLAFAXINE HYDROCHLORIDE 37.5 MG/1
37.5 CAPSULE, EXTENDED RELEASE ORAL DAILY
Status: ON HOLD | COMMUNITY

## 2025-03-13 RX ORDER — ASPIRIN 81 MG/1
81 TABLET, CHEWABLE ORAL DAILY
Status: ON HOLD | COMMUNITY

## 2025-03-13 RX ORDER — LOSARTAN POTASSIUM 100 MG/1
25 TABLET ORAL DAILY
Status: ON HOLD | COMMUNITY

## 2025-03-13 RX ORDER — ISOSORBIDE MONONITRATE 30 MG/1
90 TABLET, EXTENDED RELEASE ORAL DAILY
Status: ON HOLD | COMMUNITY

## 2025-03-13 NOTE — ADDENDUM NOTE
Addended by: LISSA MCLAUGHLIN on: 3/13/2025 09:41 AM     Modules accepted: Orders, Level of Service

## 2025-03-15 ENCOUNTER — TELEPHONE (OUTPATIENT)
Dept: OTHER | Facility: OTHER | Age: 80
End: 2025-03-15

## 2025-03-15 ENCOUNTER — NURSE TRIAGE (OUTPATIENT)
Dept: OTHER | Facility: OTHER | Age: 80
End: 2025-03-15

## 2025-03-15 NOTE — TELEPHONE ENCOUNTER
Possible order for Dexcom G7. Currently following insulin discharge orders but will like to switch. Denies distress. Callback requested

## 2025-03-15 NOTE — TELEPHONE ENCOUNTER
"Regarding: Diarrhea/ Medication question  ----- Message from Kirsten RITCHIE sent at 3/15/2025  7:45 PM EDT -----  Pt stated, \" I am diabetic and I could not eat today, I have diarrhea and all I had was 2 cookies. I took 2 units of my medication but I was directed to take 5 units.\"       [place] 300 units/mL CONCENTRATED U-300 injection pen (1-unit dial)    "

## 2025-03-16 DIAGNOSIS — F41.9 ANXIETY: ICD-10-CM

## 2025-03-16 DIAGNOSIS — F41.0 PANIC ATTACKS: ICD-10-CM

## 2025-03-16 DIAGNOSIS — F41.1 GAD (GENERALIZED ANXIETY DISORDER): ICD-10-CM

## 2025-03-16 NOTE — TELEPHONE ENCOUNTER
Esc response from Dr Worrell : In  addition to going to the  ED referral for eval. She can drink some Gatorade or similar electrolyte drink, and try to eat some toast or crackers, peanut butter and honey would also be helpful since she has  already took her insulin.

## 2025-03-16 NOTE — TELEPHONE ENCOUNTER
"      FOLLOW UP: Please follow up with pt regarding Dex Com BS monitor.     REASON FOR CONVERSATION: Medication Management and Diarrhea    SYMPTOMS: chronic diarrhea ,not eating or drinking. She took her aspart insulin tonight but dose not have a working BS monitor and does not feel well.   OTHER: Referred to Ed for evaluation  but has refused    DISPOSITION: No disposition on file.  Pt verbalized understanding of Ed advise and on call Dr Worrell care advise but still declines recommendations at this time.       Reason for Disposition  • [1] Drinking very little AND [2] dehydration suspected (e.g., no urine > 12 hours, very dry mouth, very lightheaded)    Answer Assessment - Initial Assessment Questions  1. DIARRHEA SEVERITY: \"How bad is the diarrhea?\" \"How many more stools have you had in the past 24 hours than normal?\"       5 loose stools  2. ONSET: \"When did the diarrhea begin?\"       Months ago   3. STOOL DESCRIPTION:  \"How loose or watery is the diarrhea?\" \"What is the stool color?\" \"Is there any blood or mucous in the stool?\"      Loose watery stool  4. VOMITING: \"Are you also vomiting?\" If Yes, ask: \"How many times in the past 24 hours?\"       Vomited yesterday once  5. ABDOMEN PAIN: \"Are you having any abdomen pain?\" If Yes, ask: \"What does it feel like?\" (e.g., crampy, dull, intermittent, constant)       none  6. ABDOMEN PAIN SEVERITY: If present, ask: \"How bad is the pain?\"  (e.g., Scale 1-10; mild, moderate, or severe)      none  7. ORAL INTAKE: If vomiting, \"Have you been able to drink liquids?\" \"How much liquids have you had in the past 24 hours?\"      She drank only half a can of diet pepsi and 2 cookies.   8. HYDRATION: \"Any signs of dehydration?\" (e.g., dry mouth [not just dry lips], too weak to stand, dizziness, new weight loss) \"When did you last urinate?\"      Last time she voided was 2 hours ago.   9. EXPOSURE: \"Have you traveled to a foreign country recently?\" \"Have you been exposed to anyone " "with diarrhea?\" \"Could you have eaten any food that was spoiled?\"      no  10. ANTIBIOTIC USE: \"Are you taking antibiotics now or have you taken antibiotics in the past 2 months?\"        none  11. OTHER SYMPTOMS: \"Do you have any other symptoms?\" (e.g., fever, blood in stool)        She is a diabetic not eating , has diarrhea and took 2 units of her insulin Apart . She does not have a blood sugar monitor, Her BS monitor broke and it was between 200-300. But now she has no way to check her BS    Protocols used: Diarrhea-Adult-AH    "

## 2025-03-16 NOTE — TELEPHONE ENCOUNTER
Esc to on call Dr Egan: Pt is a diabetic having chronic diarrhea and now she is not eating and barely drinking. She took her aspart insulin tonight but dose not have a working BS monitor and does not feel well. I have referred her to the Ed but she is refusing.

## 2025-03-17 ENCOUNTER — TELEPHONE (OUTPATIENT)
Age: 80
End: 2025-03-17

## 2025-03-17 RX ORDER — ALPRAZOLAM 0.5 MG
TABLET ORAL
Qty: 60 TABLET | Refills: 0 | OUTPATIENT
Start: 2025-03-17

## 2025-03-17 NOTE — TELEPHONE ENCOUNTER
Patient called states she is having diarrhea and would like to know what she can do for it. Also would like to know is there is any samples of Decom G7 sensors. Would like a call back. Has appointment 3- for [facility]

## 2025-03-18 ENCOUNTER — OFFICE VISIT (OUTPATIENT)
Dept: INTERNAL MEDICINE CLINIC | Facility: CLINIC | Age: 80
End: 2025-03-18
Payer: COMMERCIAL

## 2025-03-18 ENCOUNTER — OFFICE VISIT (OUTPATIENT)
Dept: ENDOCRINOLOGY | Facility: CLINIC | Age: 80
End: 2025-03-18
Payer: COMMERCIAL

## 2025-03-18 VITALS
SYSTOLIC BLOOD PRESSURE: 116 MMHG | HEART RATE: 105 BPM | BODY MASS INDEX: 31.47 KG/M2 | DIASTOLIC BLOOD PRESSURE: 68 MMHG | HEIGHT: 62 IN | OXYGEN SATURATION: 98 % | WEIGHT: 171 LBS | RESPIRATION RATE: 12 BRPM | TEMPERATURE: 98.3 F

## 2025-03-18 VITALS
HEART RATE: 93 BPM | SYSTOLIC BLOOD PRESSURE: 110 MMHG | DIASTOLIC BLOOD PRESSURE: 80 MMHG | OXYGEN SATURATION: 99 % | BODY MASS INDEX: 31.28 KG/M2 | WEIGHT: 171 LBS

## 2025-03-18 DIAGNOSIS — Z78.0 ASYMPTOMATIC MENOPAUSAL STATE: ICD-10-CM

## 2025-03-18 DIAGNOSIS — I10 ESSENTIAL HYPERTENSION: ICD-10-CM

## 2025-03-18 DIAGNOSIS — E03.9 HYPOTHYROIDISM, UNSPECIFIED TYPE: ICD-10-CM

## 2025-03-18 DIAGNOSIS — E66.9 OBESITY (BMI 30-39.9): ICD-10-CM

## 2025-03-18 DIAGNOSIS — E11.65 TYPE 2 DIABETES MELLITUS WITH HYPERGLYCEMIA, WITH LONG-TERM CURRENT USE OF INSULIN (HCC): Primary | ICD-10-CM

## 2025-03-18 DIAGNOSIS — R19.7 DIARRHEA, UNSPECIFIED TYPE: ICD-10-CM

## 2025-03-18 DIAGNOSIS — E55.9 VITAMIN D DEFICIENCY: ICD-10-CM

## 2025-03-18 DIAGNOSIS — E11.42 DIABETIC POLYNEUROPATHY ASSOCIATED WITH TYPE 2 DIABETES MELLITUS (HCC): Chronic | ICD-10-CM

## 2025-03-18 DIAGNOSIS — Z13.820 SCREENING FOR OSTEOPOROSIS: Primary | ICD-10-CM

## 2025-03-18 DIAGNOSIS — R26.2 AMBULATORY DYSFUNCTION: ICD-10-CM

## 2025-03-18 DIAGNOSIS — E16.2 HYPOGLYCEMIA: ICD-10-CM

## 2025-03-18 DIAGNOSIS — Z79.4 TYPE 2 DIABETES MELLITUS WITH HYPERGLYCEMIA, WITH LONG-TERM CURRENT USE OF INSULIN (HCC): Primary | ICD-10-CM

## 2025-03-18 DIAGNOSIS — E78.2 MIXED HYPERLIPIDEMIA: Chronic | ICD-10-CM

## 2025-03-18 PROBLEM — R21 RASH: Status: RESOLVED | Noted: 2021-09-01 | Resolved: 2025-03-18

## 2025-03-18 PROBLEM — K63.5 COLON POLYPS: Status: RESOLVED | Noted: 2017-05-04 | Resolved: 2025-03-18

## 2025-03-18 PROBLEM — J18.9 PNEUMONIA OF BOTH LUNGS DUE TO INFECTIOUS ORGANISM: Status: RESOLVED | Noted: 2025-02-11 | Resolved: 2025-03-18

## 2025-03-18 PROBLEM — K59.09 OTHER CONSTIPATION: Status: RESOLVED | Noted: 2022-03-31 | Resolved: 2025-03-18

## 2025-03-18 PROBLEM — F41.1 GAD (GENERALIZED ANXIETY DISORDER): Status: RESOLVED | Noted: 2020-04-23 | Resolved: 2025-03-18

## 2025-03-18 PROBLEM — U07.1 COVID-19: Status: RESOLVED | Noted: 2023-02-20 | Resolved: 2025-03-18

## 2025-03-18 PROBLEM — S02.2XXA CLOSED FRACTURE OF NASAL BONES: Status: RESOLVED | Noted: 2019-07-21 | Resolved: 2025-03-18

## 2025-03-18 PROBLEM — I49.3 PVC'S (PREMATURE VENTRICULAR CONTRACTIONS): Chronic | Status: RESOLVED | Noted: 2017-04-19 | Resolved: 2025-03-18

## 2025-03-18 PROBLEM — F33.2 MDD (MAJOR DEPRESSIVE DISORDER), RECURRENT SEVERE, WITHOUT PSYCHOSIS (HCC): Status: RESOLVED | Noted: 2017-04-19 | Resolved: 2025-03-18

## 2025-03-18 PROBLEM — M24.549 CONTRACTURE OF HAND: Status: RESOLVED | Noted: 2022-01-18 | Resolved: 2025-03-18

## 2025-03-18 PROBLEM — B02.29 POST HERPETIC NEURALGIA: Status: RESOLVED | Noted: 2021-07-15 | Resolved: 2025-03-18

## 2025-03-18 PROBLEM — N17.9 ACUTE KIDNEY INJURY SUPERIMPOSED ON CHRONIC KIDNEY DISEASE  (HCC): Status: RESOLVED | Noted: 2024-11-01 | Resolved: 2025-03-18

## 2025-03-18 PROBLEM — R16.0 ENLARGED LIVER: Status: RESOLVED | Noted: 2023-07-30 | Resolved: 2025-03-18

## 2025-03-18 PROBLEM — R60.0 EDEMA OF BOTH LOWER EXTREMITIES: Status: RESOLVED | Noted: 2023-04-03 | Resolved: 2025-03-18

## 2025-03-18 PROBLEM — Z12.31 ENCOUNTER FOR SCREENING MAMMOGRAM FOR BREAST CANCER: Status: RESOLVED | Noted: 2022-08-09 | Resolved: 2025-03-18

## 2025-03-18 PROBLEM — M54.2 NECK PAIN: Status: RESOLVED | Noted: 2021-06-07 | Resolved: 2025-03-18

## 2025-03-18 PROBLEM — N85.01 COMPLEX ENDOMETRIAL HYPERPLASIA: Status: RESOLVED | Noted: 2017-05-18 | Resolved: 2025-03-18

## 2025-03-18 PROBLEM — R14.0 BLOATING SYMPTOM: Status: RESOLVED | Noted: 2021-04-23 | Resolved: 2025-03-18

## 2025-03-18 PROBLEM — F41.0 PANIC ATTACKS: Status: RESOLVED | Noted: 2017-04-19 | Resolved: 2025-03-18

## 2025-03-18 PROBLEM — L21.9 SEBORRHEIC DERMATITIS: Status: RESOLVED | Noted: 2024-04-23 | Resolved: 2025-03-18

## 2025-03-18 PROBLEM — R91.1 LUNG NODULE: Status: RESOLVED | Noted: 2021-02-24 | Resolved: 2025-03-18

## 2025-03-18 PROBLEM — J98.4 RESTRICTIVE LUNG DISEASE SECONDARY TO OBESITY: Status: RESOLVED | Noted: 2019-10-21 | Resolved: 2025-03-18

## 2025-03-18 PROBLEM — R53.1 GENERALIZED WEAKNESS: Status: RESOLVED | Noted: 2025-03-11 | Resolved: 2025-03-18

## 2025-03-18 PROBLEM — R10.9 RIGHT SIDED ABDOMINAL PAIN: Status: RESOLVED | Noted: 2023-08-31 | Resolved: 2025-03-18

## 2025-03-18 PROBLEM — G47.00 INSOMNIA: Status: RESOLVED | Noted: 2020-08-24 | Resolved: 2025-03-18

## 2025-03-18 PROBLEM — R91.8 PULMONARY NODULES: Status: RESOLVED | Noted: 2018-09-13 | Resolved: 2025-03-18

## 2025-03-18 PROBLEM — G89.4 CHRONIC PAIN SYNDROME: Status: RESOLVED | Noted: 2019-01-16 | Resolved: 2025-03-18

## 2025-03-18 PROBLEM — N28.1 KIDNEY CYSTS: Status: RESOLVED | Noted: 2023-07-30 | Resolved: 2025-03-18

## 2025-03-18 PROBLEM — H53.2 DIPLOPIA: Status: RESOLVED | Noted: 2021-12-07 | Resolved: 2025-03-18

## 2025-03-18 PROBLEM — N18.9 CRI (CHRONIC RENAL INSUFFICIENCY): Status: RESOLVED | Noted: 2023-06-29 | Resolved: 2025-03-18

## 2025-03-18 PROBLEM — Z86.0100 HISTORY OF COLON POLYPS: Status: RESOLVED | Noted: 2024-12-11 | Resolved: 2025-03-18

## 2025-03-18 PROBLEM — R93.89 THICKENED ENDOMETRIUM: Status: RESOLVED | Noted: 2017-04-25 | Resolved: 2025-03-18

## 2025-03-18 PROBLEM — M47.816 LUMBAR FACET ARTHROPATHY: Status: RESOLVED | Noted: 2019-01-16 | Resolved: 2025-03-18

## 2025-03-18 PROBLEM — R09.82 POST-NASAL DRIP: Status: RESOLVED | Noted: 2019-01-31 | Resolved: 2025-03-18

## 2025-03-18 PROBLEM — N18.9 ACUTE KIDNEY INJURY SUPERIMPOSED ON CHRONIC KIDNEY DISEASE  (HCC): Status: RESOLVED | Noted: 2024-11-01 | Resolved: 2025-03-18

## 2025-03-18 PROBLEM — H53.9 VISION CHANGES: Status: RESOLVED | Noted: 2019-01-31 | Resolved: 2025-03-18

## 2025-03-18 PROBLEM — K59.00 CONSTIPATION: Status: RESOLVED | Noted: 2023-09-01 | Resolved: 2025-03-18

## 2025-03-18 PROBLEM — R07.1 CHEST PAIN ON BREATHING: Status: RESOLVED | Noted: 2017-04-19 | Resolved: 2025-03-18

## 2025-03-18 PROBLEM — J02.9 SORE THROAT: Status: RESOLVED | Noted: 2024-12-06 | Resolved: 2025-03-18

## 2025-03-18 PROBLEM — R06.02 SOB (SHORTNESS OF BREATH): Status: RESOLVED | Noted: 2021-08-10 | Resolved: 2025-03-18

## 2025-03-18 PROBLEM — Z95.5 PRESENCE OF STENT IN CORONARY ARTERY: Status: RESOLVED | Noted: 2021-08-23 | Resolved: 2025-03-18

## 2025-03-18 PROBLEM — M51.24 PROTRUSION OF INTERVERTEBRAL DISC OF THORACIC REGION: Status: RESOLVED | Noted: 2022-04-23 | Resolved: 2025-03-18

## 2025-03-18 PROBLEM — M54.31 SCIATICA OF RIGHT SIDE: Status: RESOLVED | Noted: 2023-04-11 | Resolved: 2025-03-18

## 2025-03-18 PROBLEM — M79.7 FIBROMYALGIA: Chronic | Status: RESOLVED | Noted: 2017-04-19 | Resolved: 2025-03-18

## 2025-03-18 PROCEDURE — G2211 COMPLEX E/M VISIT ADD ON: HCPCS | Performed by: INTERNAL MEDICINE

## 2025-03-18 PROCEDURE — 95251 CONT GLUC MNTR ANALYSIS I&R: CPT | Performed by: INTERNAL MEDICINE

## 2025-03-18 PROCEDURE — 99204 OFFICE O/P NEW MOD 45 MIN: CPT | Performed by: INTERNAL MEDICINE

## 2025-03-18 PROCEDURE — 99214 OFFICE O/P EST MOD 30 MIN: CPT | Performed by: INTERNAL MEDICINE

## 2025-03-18 RX ORDER — INSULIN ASPART 100 [IU]/ML
INJECTION, SOLUTION INTRAVENOUS; SUBCUTANEOUS
Qty: 60 ML | Refills: 1 | Status: SHIPPED | OUTPATIENT
Start: 2025-03-18 | End: 2025-03-28

## 2025-03-18 RX ORDER — INSULIN GLARGINE 300 U/ML
50 INJECTION, SOLUTION SUBCUTANEOUS DAILY
Qty: 15 ML | Refills: 1 | Status: SHIPPED | OUTPATIENT
Start: 2025-03-18 | End: 2025-03-28

## 2025-03-18 RX ORDER — LEVOTHYROXINE SODIUM 75 UG/1
75 TABLET ORAL DAILY
Qty: 90 TABLET | Refills: 1 | Status: ON HOLD | OUTPATIENT
Start: 2025-03-18 | End: 2025-03-27

## 2025-03-18 RX ORDER — ACYCLOVIR 400 MG/1
1 TABLET ORAL
Qty: 3 EACH | Refills: 5 | Status: SHIPPED | OUTPATIENT
Start: 2025-03-18

## 2025-03-18 NOTE — ASSESSMENT & PLAN NOTE
Transition care management visit regarding recent hospitalization at [facility] admission date 3/6/2025 the patient arrived via EMS with a chief complaint of hypoglycemia she states she took 18 units of insulin at 1900 but did not really eat on that day.  She developed vomiting along with diarrhea.  Patient was found to have a blood glucose at 54 during the hospitalization.  Her symptoms did improve with dextrose.  A1c 11.1 during hospitalization PT OT recommended short-term nursing home for physical therapy she refused psychiatry was called in and she was not deemed to have capacity for discharge planning therefore patient was sent to nursing home she met all of her goals in the nursing home and discharged in 4 days.  She was felt to be competent by geriatrics.  Since coming home she has developed diarrhea along with her  watery initially now mushy no fevers or chills.  Also she has been to endocrinology earlier today he is working with the patient she is now on a lower dose of insulin she has not had any further hypoglycemia

## 2025-03-18 NOTE — ASSESSMENT & PLAN NOTE
She reports loose stools for about 5 days with both patient and .  Given recent hospitalization and Abx use, should rule out cdiff colitis.  She is scheduled to see PCP later today. Will defer care to PCP.

## 2025-03-18 NOTE — ASSESSMENT & PLAN NOTE
Ongoing diarrhea started after being in the nursing home her  had similar symptoms at the same time but he has improved patient reports me mushy stool no fever no chills will check stool enteric bacterial panel, norovirus RNA and C. difficile if these tests are negative see GI for colonoscopy recommend brat diet, as needed Imodium lactose-free.  Call if any change, worse or symptoms do not juan  Orders:  •  Stool Enteric Bacterial Panel by PCR; Future  •  Norovirus RNA, Qualitative Real-Time PCR; Future  •  Clostridioides difficile toxin by PCR with EIA; Future   no edema

## 2025-03-18 NOTE — ASSESSMENT & PLAN NOTE
She seems clinically stable.  We agreed to continue levothyroxine 75mcg qdaily and repeat labs prior to next visit.

## 2025-03-18 NOTE — ASSESSMENT & PLAN NOTE
She seems uncontrolled with reported hyperglycemia and hypoglycemia. She was recently hospitalized. Diabetes is complicated with CKD3 eGFR 37.    Today we discussed all aspects of diabetes including pathophysiology, risk factors, complications, SAGM, CGM, diet, lifestyle modifications, medical fitness training, diabetes education, goals of therapy, follow up needs and medications including insulin, metformin, Jardiance and GLP1 agonists.  Advised to maintain goal blood sugars 70-180mg/dL.      Novolog Insulin   15u   15u   15u    Regular, Apidra, Humalog orNovolog Sliding Scale:   <80              151-200 + 2 +2 +2    201-250 +4 +4 +4 +   251-300 +6 +6 +6 +   301-350 +8 +8 +8 +   >350 +10 +10 +10 +     Lantus/Toujeo Insulin 50u        GMI was 8.8%. Goal A1c is 7%.  Advised education for carb counting.    Follow up in 2-3 months.      Lab Results   Component Value Date    HGBA1C 11.1 (H) 03/07/2025

## 2025-03-18 NOTE — PROGRESS NOTES
"    New Consult Note      CC: Type 2 diabetes, weight loss    History of Present Illness:   79yr female with Type 2 Diabetes since 1994, HTN, HLD, CKD3 eGFR 37, Hypothyroidism, CAD, DJD and vit D deficiency.    Lost 20 lbs since last visit with Dr. Oliva 8/30/24.      CGM data review::  Device: Dexcom Dates: 3/6/25-3/18/25  Usage: 93 % Av glu: 228 mg/dL  SD: 66 mg/dL CV: 29.1 % GMI: 8.8 %  TIR: 19 % TAR: 45+35 %  TBR: 1  %    Glycemic patters:  some fasting and significant pp hyperglycemia.  Hypoglycemia: No    Current meds:  Toujeo 50u  Novolog 20u TIDAC    Opthamology: yes  Podiatry:   vaccination:   Dental:  Pancreatitis:     Ace/ARB: losartan  Statin:rosuvastatin  Thyroid issues:    FHx: father had diabetes and CKD requiring PD.    Physical Exam:  Body mass index is 31.28 kg/m².  /68 (BP Location: Left arm, Patient Position: Sitting)   Pulse 105   Temp 98.3 °F (36.8 °C) (Tympanic)   Resp 12   Ht 5' 2\" (1.575 m)   Wt 77.6 kg (171 lb)   SpO2 98%   BMI 31.28 kg/m²    Vitals:    03/18/25 1021   Weight: 77.6 kg (171 lb)        Physical Exam  Constitutional:       General: She is not in acute distress.     Appearance: She is well-developed.   HENT:      Head: Normocephalic and atraumatic.      Nose: Nose normal.   Eyes:      Conjunctiva/sclera: Conjunctivae normal.   Pulmonary:      Effort: Pulmonary effort is normal.   Abdominal:      General: There is no distension.   Musculoskeletal:      Cervical back: Normal range of motion and neck supple.   Skin:     Findings: No rash.      Comments: No icterus   Neurological:      Mental Status: She is alert and oriented to person, place, and time.       Labs:   Lab Results   Component Value Date    HGBA1C 11.1 (H) 03/07/2025       Lab Results   Component Value Date    BAK8MIMNFAKX 2.490 02/12/2025    TSH 2.95 02/03/2023       Lab Results   Component Value Date    CREATININE 1.46 (H) 03/07/2025    CREATININE 1.55 (H) 03/06/2025    CREATININE 1.36 (H) 03/04/2025 "    BUN 15 03/07/2025     11/16/2017    K 3.8 03/07/2025     03/07/2025    CO2 22 03/07/2025     eGFRcr   Date Value Ref Range Status   03/07/2025 37 (L) >59 Final       Lab Results   Component Value Date    ALT 12 03/07/2025    AST 19 03/07/2025    ALKPHOS 89 03/07/2025    BILITOT 0.5 11/16/2017       Lab Results   Component Value Date    CHOLESTEROL 135 12/13/2021     Lab Results   Component Value Date    HDL 52 12/13/2021    HDL 44 (L) 11/16/2017    HDL 41 (L) 02/19/2016     Lab Results   Component Value Date    TRIG 111 12/13/2021    TRIG 147 11/16/2017    TRIG 109 02/19/2016     Lab Results   Component Value Date    NONHDLC 117 11/16/2017    NONHDLC 131 02/19/2016         Assessment/Plan:    1. Type 2 diabetes mellitus with hyperglycemia, with long-term current use of insulin ([facility])  Assessment & Plan:  She seems uncontrolled with reported hyperglycemia and hypoglycemia. She was recently hospitalized. Diabetes is complicated with CKD3 eGFR 37.    Today we discussed all aspects of diabetes including pathophysiology, risk factors, complications, SAGM, CGM, diet, lifestyle modifications, medical fitness training, diabetes education, goals of therapy, follow up needs and medications including insulin, metformin, Jardiance and GLP1 agonists.  Advised to maintain goal blood sugars 70-180mg/dL.      Novolog Insulin   15u   15u   15u    Regular, Apidra, Humalog orNovolog Sliding Scale:   <80              151-200 + 2 +2 +2    201-250 +4 +4 +4 +   251-300 +6 +6 +6 +   301-350 +8 +8 +8 +   >350 +10 +10 +10 +     Lantus/Toujeo Insulin 50u        GMI was 8.8%. Goal A1c is 7%.  Advised education for carb counting.    Follow up in 2-3 months.      Lab Results   Component Value Date    HGBA1C 11.1 (H) 03/07/2025     Orders:  -     insulin aspart (NovoLOG FlexPen) 100 UNIT/ML injection pen; Use 15u tidac plus 2u/50 above 150mg/dL; max dose 70u/day  -     Toujeo SoloStar 300 units/mL CONCENTRATED U-300 injection  pen (1-unit dial); Inject 50 Units under the skin in the morning  -     Continuous Glucose Sensor (Dexcom G7 Sensor); Use 1 Device every 10 days  -     Ambulatory referral to Diabetic Education; Future  -     Hemoglobin A1C; Future  -     Albumin / creatinine urine ratio; Future  2. Diabetic polyneuropathy associated with type 2 diabetes mellitus (HCC)  3. Obesity (BMI 30-39.9)  4. Vitamin D deficiency  5. Mixed hyperlipidemia  6. Hypothyroidism, unspecified type  Assessment & Plan:  She seems clinically stable.  We agreed to continue levothyroxine 75mcg qdaily and repeat labs prior to next visit.  Orders:  -     levothyroxine 75 mcg tablet; Take 1 tablet (75 mcg total) by mouth daily  7. Ambulatory dysfunction  Assessment & Plan:  Suggested physical therapy as outpt.    8. Diarrhea, unspecified type  Assessment & Plan:  She reports loose stools for about 5 days with both patient and .  Given recent hospitalization and Abx use, should rule out cdiff colitis.  She is scheduled to see PCP later today. Will defer care to PCP.        I have spent a total time of  minutes on 03/18/25 in caring for this patient including greater than 50% of this time was spent in counseling/coordination of care as listed above.       Discussed with the patient and all questioned fully answered. She will contact me with concerns.    Ranjith Pope

## 2025-03-18 NOTE — ASSESSMENT & PLAN NOTE
Hypertension - controlled, I have counseled patient following healthy balance/, I would like the patient reduce sodium, exercise routinely, I would like the patient continued the med current medical regiment and we will continue to monitor.  Orders:  •  Comprehensive metabolic panel; Future

## 2025-03-18 NOTE — PROGRESS NOTES
Transition of Care Visit  Name: Yajaira Marsh      : 1945      MRN: [id number]  Encounter Provider: Sukumar Clemens DO  Encounter Date: 3/18/2025   Enc[facility]: [facility]    Assessment & Plan  Screening for osteoporosis    Orders:  •  DXA bone density spine hip and pelvis; Future    Essential hypertension  Hypertension - controlled, I have counseled patient following healthy balance/, I would like the patient reduce sodium, exercise routinely, I would like the patient continued the med current medical regiment and we will continue to monitor.  Orders:  •  Comprehensive metabolic panel; Future    Asymptomatic menopausal state    Orders:  •  DXA bone density spine hip and pelvis; Future    Diarrhea, unspecified type  Ongoing diarrhea started after being in the nursing home her  had similar symptoms at the same time but he has improved patient reports me mushy stool no fever no chills will check stool enteric bacterial panel, norovirus RNA and C. difficile if these tests are negative see GI for colonoscopy recommend brat diet, as needed Imodium lactose-free.  Call if any change, worse or symptoms do not juan  Orders:  •  Stool Enteric Bacterial Panel by PCR; Future  •  Norovirus RNA, Qualitative Real-Time PCR; Future  •  Clostridioides difficile toxin by PCR with EIA; Future    Hypoglycemia  Transition care management visit regarding recent hospitalization at [facility] admission date 3/6/2025 the patient arrived via EMS with a chief complaint of hypoglycemia she states she took 18 units of insulin at 1900 but did not really eat on that day.  She developed vomiting along with diarrhea.  Patient was found to have a blood glucose at 54 during the hospitalization.  Her symptoms did improve with dextrose.  A1c 11.1 during hospitalization PT OT recommended short-term nursing home for physical therapy she refused psychiatry was called in and she was  not deemed to have capacity for discharge planning therefore patient was sent to nursing home she met all of her goals in the nursing home and discharged in 4 days.  She was felt to be competent by geriatrics.  Since coming home she has developed diarrhea along with her  watery initially now mushy no fevers or chills.  Also she has been to endocrinology earlier today he is working with the patient she is now on a lower dose of insulin she has not had any further hypoglycemia        RTO in 3 months call if any problems    History of Present Illness     Transitional Care Management Review:   Yajaira Marsh is a 79 y.o. female here for TCM follow up.  Hospitalized for hypoglycemia , no mushy  no f/c low sugar  nausea rapid onset reports me she did go into a short-term rehab 4 days but was able to do everything and able to be d/c please see discharge summary    During the TCM phone call patient stated:  TCM Call (since 3/4/2025)     Date and time call was made  3/13/2025 10:01 AM    Hospital care reviewed  Records not available    Patient was hospitialized at  Other (comment)    Comment  [facility]    Date of Admission  03/08/25    Date of discharge  03/13/25    Diagnosis  Hypoglycemia    Disposition  Home      TCM Call (since 3/4/2025)     Scheduled for follow up?  Yes    I have advised the patient to call PCP with any new or worsening symptoms  Carole Love - /MA        HPI  Review of Systems   Constitutional:  Negative for activity change, appetite change and unexpected weight change.   HENT:  Negative for congestion and postnasal drip.    Eyes:  Negative for visual disturbance.   Respiratory:  Negative for cough and shortness of breath.    Cardiovascular:  Negative for chest pain.   Gastrointestinal:  Negative for abdominal pain, diarrhea, nausea and vomiting.   Neurological:  Negative for dizziness, light-headedness and headaches.   Hematological:  Negative for adenopathy.     Objective    /80 (BP Location: Left arm, Patient Position: Sitting, Cuff Size: Standard)   Pulse 93   Wt 77.6 kg (171 lb)   SpO2 99%   BMI 31.28 kg/m²     Physical Exam  Constitutional:       Appearance: She is well-developed.   HENT:      Head: Normocephalic and atraumatic.      Right Ear: External ear normal.      Left Ear: External ear normal.      Nose: Nose normal.   Eyes:      Pupils: Pupils are equal, round, and reactive to light.   Cardiovascular:      Rate and Rhythm: Normal rate and regular rhythm.      Heart sounds: Normal heart sounds. No murmur heard.  Pulmonary:      Effort: Pulmonary effort is normal.      Breath sounds: Normal breath sounds.   Abdominal:      General: There is no distension.      Palpations: Abdomen is soft.      Tenderness: There is no abdominal tenderness. There is no guarding.

## 2025-03-18 NOTE — PATIENT INSTRUCTIONS
INSULIN DOSAGE INSTRUCTIONS    Name: Yajaira Marsh                        : 1945  MRN #: [id number]    Your Current Insulin  and dose is: Before Breakfast Before Lunch Before Evening Meal Bedtime     Novolog Insulin   15u   15u   15u    Regular, Apidra, Humalog orNovolog Sliding Scale:   <80              151-200 + 2 +2 +2    201-250 +4 +4 +4 +   251-300 +6 +6 +6 +   301-350 +8 +8 +8 +   >350 +10 +10 +10 +     Lantus/Toujeo Insulin 50u        Additional Instructions:   Please test your blood sugar: 4 times daily- before meals and bedtime OR use a glucose sensor.  Target Blood sugar range _70_to _180__.  Call if your Sukumar Clemens, DO  blood sugar is less than _60_ or greater than _400__.    Today's Date: 3/18/2025

## 2025-03-19 DIAGNOSIS — F41.9 ANXIETY: ICD-10-CM

## 2025-03-19 DIAGNOSIS — F41.0 PANIC ATTACKS: ICD-10-CM

## 2025-03-19 DIAGNOSIS — F41.1 GAD (GENERALIZED ANXIETY DISORDER): ICD-10-CM

## 2025-03-20 ENCOUNTER — TELEPHONE (OUTPATIENT)
Age: 80
End: 2025-03-20

## 2025-03-20 RX ORDER — ALPRAZOLAM 0.5 MG
TABLET ORAL
Qty: 60 TABLET | Refills: 0 | Status: SHIPPED | OUTPATIENT
Start: 2025-03-20 | End: 2025-03-28

## 2025-03-20 NOTE — TELEPHONE ENCOUNTER
Call from Ale, a nurse with [employer] requesting to leave her number, [phone] x [phone], as she is patients . No call back needed unless you have questions. Thank you.

## 2025-03-20 NOTE — TELEPHONE ENCOUNTER
Patient called to request a refill for their alprazolam advised a refill was requested on 03/19/2025 and is pending approval. Patient verbalized understanding and is in agreement.     Does the patient have enough for 3 days?   [] Yes   [x] No - Send as HP to POD

## 2025-03-20 NOTE — TELEPHONE ENCOUNTER
Pt called back in stating that she is still having a hard time tolerating food. Pt was seen in office on 3/18. States that she was able to eat a hamburger yesterday but that is the only thing she has had. She is anxious about not eating much. She has tried food she likes and also a bland diet. Offered appt but pt decline, would like to hear from PCP.

## 2025-03-22 ENCOUNTER — HOSPITAL ENCOUNTER (EMERGENCY)
Facility: HOSPITAL | Age: 80
Discharge: HOME | End: 2025-03-22
Attending: EMERGENCY MEDICINE
Payer: COMMERCIAL

## 2025-03-22 ENCOUNTER — APPOINTMENT (EMERGENCY)
Dept: RADIOLOGY | Facility: HOSPITAL | Age: 80
End: 2025-03-22
Payer: COMMERCIAL

## 2025-03-22 VITALS
TEMPERATURE: 97.8 F | SYSTOLIC BLOOD PRESSURE: 166 MMHG | RESPIRATION RATE: 18 BRPM | DIASTOLIC BLOOD PRESSURE: 64 MMHG | HEART RATE: 59 BPM | OXYGEN SATURATION: 100 %

## 2025-03-22 DIAGNOSIS — R73.9 HYPERGLYCEMIA: ICD-10-CM

## 2025-03-22 DIAGNOSIS — R63.0 DECREASED APPETITE: ICD-10-CM

## 2025-03-22 DIAGNOSIS — E86.0 DEHYDRATION: Primary | ICD-10-CM

## 2025-03-22 DIAGNOSIS — F41.9 ANXIETY: ICD-10-CM

## 2025-03-22 LAB
ALBUMIN SERPL BCG-MCNC: 3.9 G/DL (ref 3.5–5)
ALP SERPL-CCNC: 95 U/L (ref 34–104)
ALT SERPL W P-5'-P-CCNC: 22 U/L (ref 7–52)
ANION GAP SERPL CALCULATED.3IONS-SCNC: 10 MMOL/L (ref 4–13)
AST SERPL W P-5'-P-CCNC: 24 U/L (ref 13–39)
BACTERIA UR QL AUTO: ABNORMAL /HPF
BASOPHILS # BLD AUTO: 0.04 THOUSANDS/ÂΜL (ref 0–0.1)
BASOPHILS NFR BLD AUTO: 0 % (ref 0–1)
BILIRUB SERPL-MCNC: 0.69 MG/DL (ref 0.2–1)
BILIRUB UR QL STRIP: NEGATIVE
BUN SERPL-MCNC: 26 MG/DL (ref 5–25)
CALCIUM SERPL-MCNC: 9.7 MG/DL (ref 8.4–10.2)
CARDIAC TROPONIN I PNL SERPL HS: 6 NG/L (ref ?–50)
CHLORIDE SERPL-SCNC: 104 MMOL/L (ref 96–108)
CLARITY UR: CLEAR
CO2 SERPL-SCNC: 19 MMOL/L (ref 21–32)
COLOR UR: ABNORMAL
CREAT SERPL-MCNC: 1.57 MG/DL (ref 0.6–1.3)
EOSINOPHIL # BLD AUTO: 0.19 THOUSAND/ÂΜL (ref 0–0.61)
EOSINOPHIL NFR BLD AUTO: 2 % (ref 0–6)
ERYTHROCYTE [DISTWIDTH] IN BLOOD BY AUTOMATED COUNT: 14.4 % (ref 11.6–15.1)
GFR SERPL CREATININE-BSD FRML MDRD: 31 ML/MIN/1.73SQ M
GLUCOSE SERPL-MCNC: 263 MG/DL (ref 65–140)
GLUCOSE SERPL-MCNC: 289 MG/DL (ref 65–140)
GLUCOSE SERPL-MCNC: 329 MG/DL (ref 65–140)
GLUCOSE SERPL-MCNC: 333 MG/DL (ref 65–140)
GLUCOSE UR STRIP-MCNC: ABNORMAL MG/DL
HCT VFR BLD AUTO: 37.1 % (ref 34.8–46.1)
HGB BLD-MCNC: 12.1 G/DL (ref 11.5–15.4)
HGB UR QL STRIP.AUTO: NEGATIVE
HYALINE CASTS #/AREA URNS LPF: ABNORMAL /LPF
IMM GRANULOCYTES # BLD AUTO: 0.04 THOUSAND/UL (ref 0–0.2)
IMM GRANULOCYTES NFR BLD AUTO: 0 % (ref 0–2)
KETONES UR STRIP-MCNC: NEGATIVE MG/DL
LACTATE SERPL-SCNC: 1.6 MMOL/L (ref 0.5–2)
LEUKOCYTE ESTERASE UR QL STRIP: ABNORMAL
LIPASE SERPL-CCNC: 51 U/L (ref 11–82)
LYMPHOCYTES # BLD AUTO: 1.9 THOUSANDS/ÂΜL (ref 0.6–4.47)
LYMPHOCYTES NFR BLD AUTO: 21 % (ref 14–44)
MCH RBC QN AUTO: 27.6 PG (ref 26.8–34.3)
MCHC RBC AUTO-ENTMCNC: 32.6 G/DL (ref 31.4–37.4)
MCV RBC AUTO: 85 FL (ref 82–98)
MONOCYTES # BLD AUTO: 0.54 THOUSAND/ÂΜL (ref 0.17–1.22)
MONOCYTES NFR BLD AUTO: 6 % (ref 4–12)
NEUTROPHILS # BLD AUTO: 6.37 THOUSANDS/ÂΜL (ref 1.85–7.62)
NEUTS SEG NFR BLD AUTO: 71 % (ref 43–75)
NITRITE UR QL STRIP: NEGATIVE
NON-SQ EPI CELLS URNS QL MICRO: ABNORMAL /HPF
NRBC BLD AUTO-RTO: 0 /100 WBCS
PH UR STRIP.AUTO: 5.5 [PH]
PLATELET # BLD AUTO: 238 THOUSANDS/UL (ref 149–390)
PMV BLD AUTO: 11.7 FL (ref 8.9–12.7)
POTASSIUM SERPL-SCNC: 3.7 MMOL/L (ref 3.5–5.3)
PROT SERPL-MCNC: 7.7 G/DL (ref 6.4–8.4)
PROT UR STRIP-MCNC: NEGATIVE MG/DL
RBC # BLD AUTO: 4.39 MILLION/UL (ref 3.81–5.12)
RBC #/AREA URNS AUTO: ABNORMAL /HPF
SODIUM SERPL-SCNC: 133 MMOL/L (ref 135–147)
SP GR UR STRIP.AUTO: 1.01 (ref 1–1.03)
UROBILINOGEN UR STRIP-ACNC: <2 MG/DL
WBC # BLD AUTO: 9.08 THOUSAND/UL (ref 4.31–10.16)
WBC #/AREA URNS AUTO: ABNORMAL /HPF

## 2025-03-22 PROCEDURE — 36415 COLL VENOUS BLD VENIPUNCTURE: CPT | Performed by: EMERGENCY MEDICINE

## 2025-03-22 PROCEDURE — 99285 EMERGENCY DEPT VISIT HI MDM: CPT | Performed by: EMERGENCY MEDICINE

## 2025-03-22 PROCEDURE — 93005 ELECTROCARDIOGRAM TRACING: CPT

## 2025-03-22 PROCEDURE — 99284 EMERGENCY DEPT VISIT MOD MDM: CPT

## 2025-03-22 PROCEDURE — 87147 CULTURE TYPE IMMUNOLOGIC: CPT | Performed by: EMERGENCY MEDICINE

## 2025-03-22 PROCEDURE — 87186 SC STD MICRODIL/AGAR DIL: CPT | Performed by: EMERGENCY MEDICINE

## 2025-03-22 PROCEDURE — 81001 URINALYSIS AUTO W/SCOPE: CPT | Performed by: EMERGENCY MEDICINE

## 2025-03-22 PROCEDURE — 83690 ASSAY OF LIPASE: CPT | Performed by: EMERGENCY MEDICINE

## 2025-03-22 PROCEDURE — 87086 URINE CULTURE/COLONY COUNT: CPT | Performed by: EMERGENCY MEDICINE

## 2025-03-22 PROCEDURE — 96372 THER/PROPH/DIAG INJ SC/IM: CPT

## 2025-03-22 PROCEDURE — 83605 ASSAY OF LACTIC ACID: CPT | Performed by: EMERGENCY MEDICINE

## 2025-03-22 PROCEDURE — 80053 COMPREHEN METABOLIC PANEL: CPT | Performed by: EMERGENCY MEDICINE

## 2025-03-22 PROCEDURE — 71045 X-RAY EXAM CHEST 1 VIEW: CPT

## 2025-03-22 PROCEDURE — 84484 ASSAY OF TROPONIN QUANT: CPT | Performed by: EMERGENCY MEDICINE

## 2025-03-22 PROCEDURE — 87077 CULTURE AEROBIC IDENTIFY: CPT | Performed by: EMERGENCY MEDICINE

## 2025-03-22 PROCEDURE — 85025 COMPLETE CBC W/AUTO DIFF WBC: CPT | Performed by: EMERGENCY MEDICINE

## 2025-03-22 PROCEDURE — 96361 HYDRATE IV INFUSION ADD-ON: CPT

## 2025-03-22 PROCEDURE — 96374 THER/PROPH/DIAG INJ IV PUSH: CPT

## 2025-03-22 PROCEDURE — 82948 REAGENT STRIP/BLOOD GLUCOSE: CPT

## 2025-03-22 RX ORDER — INSULIN LISPRO 100 [IU]/ML
4 INJECTION, SOLUTION INTRAVENOUS; SUBCUTANEOUS ONCE
Status: COMPLETED | OUTPATIENT
Start: 2025-03-22 | End: 2025-03-22

## 2025-03-22 RX ORDER — PANTOPRAZOLE SODIUM 40 MG/10ML
40 INJECTION, POWDER, LYOPHILIZED, FOR SOLUTION INTRAVENOUS ONCE
Status: COMPLETED | OUTPATIENT
Start: 2025-03-22 | End: 2025-03-22

## 2025-03-22 RX ORDER — PANTOPRAZOLE SODIUM 40 MG/1
40 TABLET, DELAYED RELEASE ORAL DAILY
Qty: 30 TABLET | Refills: 0 | Status: ON HOLD | OUTPATIENT
Start: 2025-03-22 | End: 2025-03-27

## 2025-03-22 RX ADMIN — SODIUM CHLORIDE 1000 ML: 0.9 INJECTION, SOLUTION INTRAVENOUS at 14:18

## 2025-03-22 RX ADMIN — PANTOPRAZOLE SODIUM 40 MG: 40 INJECTION, POWDER, LYOPHILIZED, FOR SOLUTION INTRAVENOUS at 15:28

## 2025-03-22 RX ADMIN — SODIUM CHLORIDE 1000 ML: 0.9 INJECTION, SOLUTION INTRAVENOUS at 15:25

## 2025-03-22 RX ADMIN — INSULIN LISPRO 4 UNITS: 100 INJECTION, SOLUTION INTRAVENOUS; SUBCUTANEOUS at 15:51

## 2025-03-22 NOTE — ED PROVIDER NOTES
Time reflects when diagnosis was documented in both MDM as applicable and the Disposition within this note       Time User Action Codes Description Comment    3/22/2025  5:24 PM Zarefes-Toby, Diane A Add [R73.9] Hyperglycemia     3/22/2025  5:24 PM Zarefes-Toby, Diane A Add [E86.0] Dehydration     3/22/2025  5:24 PM Zarefes-Callahan, Diane A Modify [R73.9] Hyperglycemia     3/22/2025  5:24 PM Zarefes-Callahan, Diane A Modify [E86.0] Dehydration     3/22/2025  5:24 PM Zarefes-Toby, Diane A Add [F41.9] Anxiety     3/22/2025  5:25 PM Zarefes-Callahan, Diane A Add [R63.0] Decreased appetite           ED Disposition       ED Disposition   Discharge    Condition   Stable    Date/Time   Sat Mar 22, 2025  5:12 PM    Comment   Yajaira Marsh discharge to home/self care.                   Assessment & Plan       Medical Decision Making  Amount and/or Complexity of Data Reviewed  Labs: ordered.  Radiology: ordered and independent interpretation performed.    Risk  Prescription drug management.        ED Course as of 03/23/25 1459   Sat Mar 22, 2025   1449 Patient with exceptionally dry mucosa in the setting of decreased appetite and elevated glucoses.  She recently completed diarrheal illness.    POCT 329.  IV fluids being given for hydration.    Chemistry has returned with very mild hyponatremia.  Creatinine just above baseline GFR very slightly reduced from her usual.  Anion gap is normal.  Not in DKA.    CBC unremarkable.  No leukocytosis or left shift suggestive of bacterial infection.   1538 Spoke with patient,  and daughter at length.  Labs reveal very mild dehydration.    Patient appreciates dryness of her mouth improving.  She has received almost a full liter of saline.  Will recheck glucose.  She was surprised to learn that this was greater than 300 today despite having consumed only a small piece of toast with a smear of jelly.  Clarified her medication use with her.  She has not  been taking the long-acting insulin concerned for possible hypoglycemia in setting of decreased appetite.  Daughter notes that decreased appetite as well as diarrhea well preceded recent norovirus infection.  These go back as far as approximately August.  Patient notes that stool would vary in consistency between water, mushy and formed.  She suggests that stress may have been contributing to this.  She is concerned regarding cost of her home and does not care for her current residence.    We discussed that stress could be additionally impacting her appetite/intake.  I also raised the possibility of stomach irritation-gastritis or ulcer.  She is not on anything for her stomach.  Suggested trial of Maalox.  She was quite opposed to this and notes that normally she is unable to get anything past her lips unless she is specifically interested in it.  She is willing to take PPI.  She was evaluated by GI and scheduled for a colonoscopy with Dr. Carrasco though ended up not being able to obtain this at the time planned.  She suspects it was related to her having felt ill and/or being near one of her hospitalizations.    Daughter notes that she has been losing weight since last summer.  CT scan of the abdomen and pelvis was done earlier this month-unremarkable.  Thyroid studies were performed last month-also unremarkable.  Daughter notes that she is considering moving in with patient and her  as they both require assistance with medication use and other self-care.  Idea of going to a facility has been entertained though patient very much prefers her privacy.       1542 Emphasized need to take her long-term insulin as prescribed.  Considered giving it here although it is not on our formulary.    At this time patient is p.o. challenging with small beverage and snack.  Glucose has been rechecked and has lowered to 289.  Will give her her usual 2 units of short acting insulin with this afternoon along with an additional 2  units to help lower her glucose to more normal range.  Will encourage use of long-term insulin tonight.   1616 Urinalysis has been completed.  Specific gravity is good.  No ketones are present.  Trace leukocyte esterase notable though only occasional bacteria and very slight white blood cell elevation.  She denies any urinary symptoms.  He is urine results are equivocal for UTI.  Urine culture is in process.   1637 After 4 units of insulin and 1.5 L of saline glucose has decreased to 263.  This is additionally with her having consumed small snack.   1718 Spoke with patient and daughter at length following glucose of 263.  Patient expresses comfort with restarting long-acting insulin and taking this daily identifying it is important.    We discussed difficulties with regular intake.  She does have some preferred foods although these are not readily available to her.  She is not interested in a service such as Meals on Wheels and notes that her kitchen is very small and not conducive to cooking.  Daughter is trying to figure out some ways that would help with this.  Neither feel that a dietitian would be helpful.    Both patient and her daughter do feel that her anxiety and depression are likely factoring into decreased appetite.  She has been fairly isolated since moving to her [address] and older community residence.  Daughter notes that she is going to work to figure out difficulty with Internet preventing patient's  from getting emails regarding community events.  Patient identifies that she did have a therapist through her PCPs office and found regular visits very helpful however the therapist is no longer working at that office and she has not found a new one.  She will be meeting with Dr. Fernandez on Wednesday.  I encouraged her to talk about how her anxiety is worsened.  Both of them especially noticed that this worsens in the afternoon/evening.  Both are receptive to crisis consult here for resources.    I  spoke with  Ching Guillory who will meet with Yajaira and her daughter and provide them with resources.   0209 Ching is at bedside.       Medications   sodium chloride 0.9 % bolus 1,000 mL (0 mL Intravenous Stopped 3/22/25 1520)   sodium chloride 0.9 % bolus 1,000 mL (0 mL Intravenous Stopped 3/22/25 1625)   pantoprazole (PROTONIX) injection 40 mg (40 mg Intravenous Given 3/22/25 1528)   insulin lispro (HumALOG/ADMELOG) 100 units/mL subcutaneous injection 4 Units (4 Units Subcutaneous Given 3/22/25 1551)       ED Risk Strat Scores                            SBIRT 20yo+      Flowsheet Row Most Recent Value   Initial Alcohol Screen: US AUDIT-C     1. How often do you have a drink containing alcohol? 0 Filed at: 03/22/2025 1544   2. How many drinks containing alcohol do you have on a typical day you are drinking?  0 Filed at: 03/22/2025 1544   3a. Male UNDER 65: How often do you have five or more drinks on one occasion? 0 Filed at: 03/22/2025 1544   3b. FEMALE Any Age, or MALE 65+: How often do you have 4 or more drinks on one occassion? 0 Filed at: 03/22/2025 1544   Audit-C Score 0 Filed at: 03/22/2025 1544   GERMAN: How many times in the past year have you...    Used an illegal drug or used a prescription medication for non-medical reasons? Never Filed at: 03/22/2025 1544                            History of Present Illness       Chief Complaint   Patient presents with    Dehydration     Pt has been sick with diarrhea but that has  improved. Pt is talking about multiple recent er visits for multiple concerns. Today's concern is dry mouth       Past Medical History:   Diagnosis Date    Acute embolism and thrombosis of unspecified deep veins of unspecified lower extremity (HCC)     Last Assessed:  5/18/17    Anemia     Anosmia     Anxiety     Arthritis     Asthma     Back pain     Bilateral macular retinal edema     CAD (coronary artery disease)     Cataract     Cervical disc herniation     Cervical radiculopathy      Cervical spinal stenosis     Cervical spondylolysis     Chronic kidney disease     Chronic mastoiditis     Colon polyp     Complex endometrial hyperplasia     Depression     Diabetes mellitus (HCC)     Disease of thyroid gland     DVT (deep venous thrombosis) ([facility])     DVT (deep venous thrombosis) ([facility]) 06/16/2017    Fibromyalgia     Fibromyalgia, primary     Hyperlipidemia     Hypertension     Hypothyroid     Kidney stone     Lumbar radiculopathy     Neck pain     Obese     PONV (postoperative nausea and vomiting)     Shingles 07/01/2021    Spinal stenosis     Stomach ulcer     Thyroid disease     Toxic metabolic encephalopathy 02/11/2025    Vascular claudication ([facility]) 12/11/2017      Past Surgical History:   Procedure Laterality Date    BACK SURGERY      CARPAL TUNNEL RELEASE      CATARACT EXTRACTION      CHOLECYSTECTOMY      COLONOSCOPY      CORONARY ANGIOPLASTY      CORONARY ARTERY BYPASS GRAFT      CYSTOSCOPY N/A 6/20/2017    Procedure: CYSTOSCOPY;  Surgeon: Tacho Arnold MD;  [facility]: [facility];  Service: Gynecology Oncology    CYSTOSCOPY      RI LAPS TOTAL HYSTERECT 250 GM/< W/RMVL TUBE/OVARY N/A 6/20/2017    Procedure: ROBOTIC HYSTERECTOMY; BILATERAL SALPINO-OOPHERECTOMY; umbilical hernia repair.;  Surgeon: Tacho Arnold MD;  Location: [facility];  Service: Gynecology Oncology    RI REPAIR FIRST ABDOMINAL WALL HERNIA N/A 5/12/2022    Procedure: REPAIR HERNIA INCISIONAL;  Surgeon: Horacio Scherer DO;  Location: [facility];  Service: General    TONSILECTOMY AND ADNOIDECTOMY      TONSILLECTOMY      UMBILICAL HERNIA REPAIR        Family History   Problem Relation Age of Onset    Arthritis Mother     Leukemia Mother     Other Mother         Anxiety, major depressive disorder, recurrent episode with atypical features    Coronary artery disease Father         Heart problem    Diabetes Father     Other Father         Infectious disease    Alzheimer's disease Maternal Grandmother     Other Maternal  Grandfather         Heart problem    Other Daughter         Anxiety, major depressive disorder, recurrent episode with atypical features    Alcohol abuse Other         Grandparent    Cancer Family     Diabetes Family     Hypertension Family     Other Family         Reported prior back trouble, thyroid disorder      Social History     Tobacco Use    Smoking status: Former     Current packs/day: 0.00     Average packs/day: 1 pack/day for 6.0 years (6.0 ttl pk-yrs)     Types: Cigarettes     Start date:      Quit date:      Years since quittin.2     Passive exposure: Never    Smokeless tobacco: Never    Tobacco comments:     Smoked about a half a pack for about 4 yrs.  Quite about 50 yrs ago.   Vaping Use    Vaping status: Never Used   Substance Use Topics    Alcohol use: Never    Drug use: No      E-Cigarette/Vaping    E-Cigarette Use Never User       E-Cigarette/Vaping Substances    Nicotine No     THC No     CBD No     Flavoring No     Other No     Unknown No       I have reviewed and agree with the history as documented.     Patient is a 79-year-old female who presents to the emergency department from home expressing concern that her mouth is so dry.  She is fearful that she is dehydrated again as she has been recently.  She relates that her mouth is felt dry since approximately Monday (6 days total).  She notes that she is typically a finicky eater and that recently most things have not appealed to her.  She continuously feels hungry although occasionally also has nausea and vomited once this past week upon just looking at food.  No fevers.  She is just getting over a diarrheal illness.  She suspects that this was norovirus as her  had the same.  She relates that it has been 2 days since she had diarrhea.  Since then she reports passing much flatus and last night having had incontinence of just a very tiny amount of stool.  No blood in this.  She denies noticing any significant abdominal pain.   She is unable to quench her thirst and notes that she has been drinking a good amount of sugar-free soda.  She identifies that this is not the best thing for her notes that much of the soda has also not been caffeine free.  She remains urinating and denies having dysuria though notes that she is passing smaller quantities of urine than typical.  She relays concern that some of the difficulties may be related to insulin which she was started on a couple of months ago.  She notes that an endocrinologist to seeing at the time had put her on short acting insulin 18 units at each mealtime and 52 units of Toujeo in the evening.  9 days ago she ended a 4-day stay at rehab and relays that she was discharged with instructions to use 2 units of fast acting insulin at each meal and 5 units at nighttime.  She notes that glucoses have been ranging between the 100s and 200s.  She denies having had any values above 300.  She notes that her glucose this morning was 297.  No chest pain.  She does have mild nonproductive cough.  She does intermittently appreciate very mild shortness of breath and breathes through her mouth and appreciate that that exacerbates the sensation of dryness in her mouth.    Past medical history significant for hypertension, diabetes, CKD, DVT, CAD.  She is status post CABG.  Notes that she did recently have some cardiac medications initiated.  She also notes that phlebotomy is typically challenging for her and that her veins roll.    Chart briefly reviewed.  She did see her PCP on 3/18.  At that time she reported ongoing diarrheal illness and stool studies were ordered.  She was encouraged to follow a brat diet, lactose-free diet and if diarrhea persisted with normal stool studies follow-up with GI.  She has had some ED visits including for dehydration.  She notes that she had 2 visits this week alone-Monday at which point she received many IV fluids and then the next day at [facility] as  she was concerned she might develop hypoglycemia in the setting of having taken her insulin and then eating minimally.  She reports consistently taking the short acting insulin although has at times omitted the longer acting insulin.    PCP noted that while at [facility] after consuming lower blood glucose she was evaluated by psychiatry and not felt confident for discharge planning to home.  She subsequently went to rehab afterwards.  Geriatrics did deem her competent to make decisions at that time.        Review of Systems   Constitutional:  Positive for unexpected weight change (Greater than 20 pound weight loss over the last several months).   Neurological:  Negative for dizziness, light-headedness and headaches.   Psychiatric/Behavioral:  Positive for dysphoric mood. The patient is nervous/anxious.         No SI or HI   All other systems reviewed and are negative.          Objective       ED Triage Vitals   Temperature Pulse Blood Pressure Respirations SpO2 Patient Position - Orthostatic VS   03/22/25 1317 03/22/25 1317 03/22/25 1317 03/22/25 1317 03/22/25 1317 03/22/25 1317   97.8 °F (36.6 °C) 77 170/72 18 99 % Sitting      Temp Source Heart Rate Source BP Location FiO2 (%) Pain Score    03/22/25 1317 03/22/25 1630 03/22/25 1317 -- --    Oral Monitor Right arm        Vitals      Date and Time Temp Pulse SpO2 Resp BP Pain Score FACES Pain Rating User   03/22/25 1630 -- 59 100 % 18 166/64 -- -- DP   03/22/25 1317 97.8 °F (36.6 °C) 77 99 % 18 170/72 -- -- RLN            Physical Exam  Vitals and nursing note reviewed.   Constitutional:       Appearance: Normal appearance.   HENT:      Head: Normocephalic.      Mouth/Throat:      Mouth: Mucous membranes are dry.      Comments: Tongue is extremely dry  Eyes:      Extraocular Movements: Extraocular movements intact.      Conjunctiva/sclera: Conjunctivae normal.   Cardiovascular:      Rate and Rhythm: Normal rate and regular rhythm.      Heart sounds: Normal heart  sounds.   Pulmonary:      Effort: Pulmonary effort is normal.      Breath sounds: Normal breath sounds.   Abdominal:      General: Bowel sounds are normal.      Palpations: Abdomen is soft.      Tenderness: There is abdominal tenderness (Mild, epigastric). There is no right CVA tenderness or guarding.   Musculoskeletal:         General: Normal range of motion.      Cervical back: Neck supple.      Comments: Trace bilateral ankle edema.  +1 PT pulses.  No calf tenderness.   Skin:     General: Skin is warm and dry.   Neurological:      Mental Status: She is alert and oriented to person, place, and time.   Psychiatric:         Mood and Affect: Mood is depressed.         Behavior: Behavior normal.         Results Reviewed       Procedure Component Value Units Date/Time    Fingerstick Glucose (POCT) [[id number]]  (Abnormal) Collected: 03/22/25 1633    Lab Status: Final result Specimen: Blood Updated: 03/22/25 1634     POC Glucose 263 mg/dl     Urine Microscopic [[id number]]  (Abnormal) Collected: 03/22/25 1550    Lab Status: Final result Specimen: Urine, Clean Catch Updated: 03/22/25 1613     RBC, UA 1-2 /hpf      WBC, UA 10-20 /hpf      Epithelial Cells Occasional /hpf      Bacteria, UA Occasional /hpf      Hyaline Casts, UA 0-3 /lpf     Urine culture [[id number]] Collected: 03/22/25 1550    Lab Status: In process Specimen: Urine, Clean Catch Updated: 03/22/25 1613    UA w Reflex to Microscopic w Reflex to Culture [[id number]]  (Abnormal) Collected: 03/22/25 1550    Lab Status: Final result Specimen: Urine, Clean Catch Updated: 03/22/25 1606     Color, UA Light Yellow     Clarity, UA Clear     Specific Gravity, UA 1.007     pH, UA 5.5     Leukocytes, UA Trace     Nitrite, UA Negative     Protein, UA Negative mg/dl      Glucose,  (1/5%) mg/dl      Ketones, UA Negative mg/dl      Urobilinogen, UA <2.0 mg/dl      Bilirubin, UA Negative     Occult Blood, UA Negative    Fingerstick Glucose (POCT) [[id number]]  (Abnormal)  Collected: 03/22/25 1527    Lab Status: Final result Specimen: Blood Updated: 03/22/25 1528     POC Glucose 289 mg/dl     HS Troponin 0hr (reflex protocol) [[id number]]  (Normal) Collected: 03/22/25 1404    Lab Status: Final result Specimen: Blood from Arm, Right Updated: 03/22/25 1435     hs TnI 0hr 6 ng/L     Comprehensive metabolic panel [[id number]]  (Abnormal) Collected: 03/22/25 1404    Lab Status: Final result Specimen: Blood from Arm, Right Updated: 03/22/25 1434     Sodium 133 mmol/L      Potassium 3.7 mmol/L      Chloride 104 mmol/L      CO2 19 mmol/L      ANION GAP 10 mmol/L      BUN 26 mg/dL      Creatinine 1.57 mg/dL      Glucose 333 mg/dL      Calcium 9.7 mg/dL      AST 24 U/L      ALT 22 U/L      Alkaline Phosphatase 95 U/L      Total Protein 7.7 g/dL      Albumin 3.9 g/dL      Total Bilirubin 0.69 mg/dL      eGFR 31 ml/min/1.73sq m     Narrative:      National Kidney Disease Foundation guidelines for Chronic Kidney Disease (CKD):     Stage 1 with normal or high GFR (GFR > 90 mL/min/1.73 square meters)    Stage 2 Mild CKD (GFR = 60-89 mL/min/1.73 square meters)    Stage 3A Moderate CKD (GFR = 45-59 mL/min/1.73 square meters)    Stage 3B Moderate CKD (GFR = 30-44 mL/min/1.73 square meters)    Stage 4 Severe CKD (GFR = 15-29 mL/min/1.73 square meters)    Stage 5 End Stage CKD (GFR <15 mL/min/1.73 square meters)  Note: GFR calculation is accurate only with a steady state creatinine    Lipase [[id number]]  (Normal) Collected: 03/22/25 1404    Lab Status: Final result Specimen: Blood from Arm, Right Updated: 03/22/25 1434     Lipase 51 u/L     Fingerstick Glucose (POCT) [[id number]]  (Abnormal) Collected: 03/22/25 1431    Lab Status: Final result Specimen: Blood Updated: 03/22/25 1433     POC Glucose 329 mg/dl     Lactic acid, plasma (w/reflex if result > 2.0) [[id number]]  (Normal) Collected: 03/22/25 1404    Lab Status: Final result Specimen: Blood from Arm, Right Updated: 03/22/25 1431     LACTIC ACID 1.6  mmol/L     Narrative:      Result may be elevated if tourniquet was used during collection.    CBC and differential [[id number]] Collected: 03/22/25 1404    Lab Status: Final result Specimen: Blood from Arm, Right Updated: 03/22/25 1413     WBC 9.08 Thousand/uL      RBC 4.39 Million/uL      Hemoglobin 12.1 g/dL      Hematocrit 37.1 %      MCV 85 fL      MCH 27.6 pg      MCHC 32.6 g/dL      RDW 14.4 %      MPV 11.7 fL      Platelets 238 Thousands/uL      nRBC 0 /100 WBCs      Segmented % 71 %      Immature Grans % 0 %      Lymphocytes % 21 %      Monocytes % 6 %      Eosinophils Relative 2 %      Basophils Relative 0 %      Absolute Neutrophils 6.37 Thousands/µL      Absolute Immature Grans 0.04 Thousand/uL      Absolute Lymphocytes 1.90 Thousands/µL      Absolute Monocytes 0.54 Thousand/µL      Eosinophils Absolute 0.19 Thousand/µL      Basophils Absolute 0.04 Thousands/µL             XR chest 1 view portable   ED Interpretation by Diane Zelaya MD (03/22 3217)   Stable cardiac silhouette.  Clear lungs.      Final Interpretation by Vidya Hawthorne MD (03/22 2235)      No acute cardiopulmonary disease.            Workstation performed: [id number]             ECG 12 Lead Documentation Only    Date/Time: 3/22/2025 2:41 PM    Performed by: Diane Zelaya MD  Authorized by: Diane Zelaya MD    ECG reviewed by me, the ED Provider: yes    Patient location:  ED  Previous ECG:     Previous ECG:  Compared to current    Comparison ECG info:  March 4, 2025  Rate:     ECG rate:  77    ECG rate assessment: normal    Rhythm:     Rhythm: sinus rhythm    QRS:     QRS axis:  Left    QRS intervals:  Normal  Conduction:     Conduction: normal    ST segments:     ST segments:  Normal  T waves:     T waves: inverted      Inverted:  III and aVF      ED Medication and Procedure Management   Prior to Admission Medications   Prescriptions Last Dose Informant Patient Reported? Taking?    ALPRAZolam (XANAX) 0.5 mg tablet   No No   Sig: TAKE 1 TABLET (0.5 MG TOTAL) BY MOUTH 2 (TWO) TIMES A DAY AS NEEDED FOR ANXIETY.   Coenzyme Q10 (Co Q-10) 200 MG CAPS  Self Yes No   Sig: Take by mouth in the morning   Continuous Glucose Sensor (Dexcom G7 Sensor)   No No   Sig: Use 1 Device every 10 days   Toujeo SoloStar 300 units/mL CONCENTRATED U-300 injection pen (1-unit dial)   No No   Sig: Inject 50 Units under the skin in the morning   aspirin 81 mg chewable tablet   Yes No   Sig: Chew 81 mg daily   cholecalciferol (VITAMIN D3) 1,000 units tablet  Self Yes No   Sig: Take 2,000 Units by mouth daily   diltiazem (TIAZAC) 300 MG 24 hr capsule  Self No No   Sig: Take 1 capsule (300 mg total) by mouth daily Tiddylt   insulin aspart (NovoLOG FlexPen) 100 UNIT/ML injection pen   No No   Sig: Use 15u tidac plus 2u/50 above 150mg/dL; max dose 70u/day   isosorbide mononitrate (IMDUR) 30 mg 24 hr tablet   Yes No   Sig: Take 90 mg by mouth daily   levothyroxine 75 mcg tablet   No No   Sig: Take 1 tablet (75 mcg total) by mouth daily   losartan (COZAAR) 100 MG tablet   Yes No   Sig: Take 25 mg by mouth daily   rosuvastatin (CRESTOR) 20 MG tablet  Self No No   Sig: Take 1 tablet (20 mg total) by mouth every evening   torsemide (DEMADEX) 20 mg tablet  Self No No   Sig: TAKE 0.5 TABLETS (10 MG TOTAL) BY MOUTH AS NEEDED (TAKE UP TO 2 TIMES PER WEEK FOR LEG SWELLING AS NEEDED)   venlafaxine (EFFEXOR-XR) 37.5 mg 24 hr capsule   Yes No   Sig: Take 37.5 mg by mouth daily      Facility-Administered Medications: None     Discharge Medication List as of 3/22/2025  6:59 PM        START taking these medications    Details   pantoprazole (PROTONIX) 40 mg tablet Take 1 tablet (40 mg total) by mouth daily, Starting Sat 3/22/2025, Until Mon 4/21/2025, Normal           CONTINUE these medications which have NOT CHANGED    Details   ALPRAZolam (XANAX) 0.5 mg tablet TAKE 1 TABLET (0.5 MG TOTAL) BY MOUTH 2 (TWO) TIMES A DAY AS NEEDED FOR  ANXIETY., Normal      aspirin 81 mg chewable tablet Chew 81 mg daily, Historical Med      cholecalciferol (VITAMIN D3) 1,000 units tablet Take 2,000 Units by mouth daily, Historical Med      Coenzyme Q10 (Co Q-10) 200 MG CAPS Take by mouth in the morning, Historical Med      Continuous Glucose Sensor (Dexcom G7 Sensor) Use 1 Device every 10 days, Starting Tue 3/18/2025, Normal      diltiazem (TIAZAC) 300 MG 24 hr capsule Take 1 capsule (300 mg total) by mouth daily Tiddylt, Starting Mon 8/19/2024, Normal      insulin aspart (NovoLOG FlexPen) 100 UNIT/ML injection pen Use 15u tidac plus 2u/50 above 150mg/dL; max dose 70u/day, Normal      isosorbide mononitrate (IMDUR) 30 mg 24 hr tablet Take 90 mg by mouth daily, Historical Med      levothyroxine 75 mcg tablet Take 1 tablet (75 mcg total) by mouth daily, Starting Tue 3/18/2025, Normal      losartan (COZAAR) 100 MG tablet Take 25 mg by mouth daily, Historical Med      rosuvastatin (CRESTOR) 20 MG tablet Take 1 tablet (20 mg total) by mouth every evening, Starting Mon 8/19/2024, Normal      torsemide (DEMADEX) 20 mg tablet TAKE 0.5 TABLETS (10 MG TOTAL) BY MOUTH AS NEEDED (TAKE UP TO 2 TIMES PER WEEK FOR LEG SWELLING AS NEEDED), Starting Fri 5/31/2024, Normal      Toujeo SoloStar 300 units/mL CONCENTRATED U-300 injection pen (1-unit dial) Inject 50 Units under the skin in the morning, Starting Tue 3/18/2025, Normal      venlafaxine (EFFEXOR-XR) 37.5 mg 24 hr capsule Take 37.5 mg by mouth daily, Historical Med           No discharge procedures on file.  ED SEPSIS DOCUMENTATION   Time reflects when diagnosis was documented in both MDM as applicable and the Disposition within this note       Time User Action Codes Description Comment    3/22/2025  5:24 PM Diane Zelaya Add [R73.9] Hyperglycemia     3/22/2025  5:24 PM Diane Zelaya Add [E86.0] Dehydration     3/22/2025  5:24 PM Diane Zelaya Modify [R73.9] Hyperglycemia      3/22/2025  5:24 PM Diane Zelaya Modify [E86.0] Dehydration     3/22/2025  5:24 PM Diane Zelaya Add [F41.9] Anxiety     3/22/2025  5:25 PM Diane Zelaya Add [R63.0] Decreased appetite                  Diane Zelaya MD  03/22/25 1729       Diane Zelaya MD  03/23/25 0198

## 2025-03-22 NOTE — DISCHARGE INSTRUCTIONS
Restart/take insulin as previously prescribed especially long-acting Tuojeo starting tonight.    Do your best to increase food and take to a more regular and consistent basis.  Decrease intake of caffeinated beverages/soda which may be dehydrating and worsening anxiety.    Take pantoprazole orally daily to help with any stomach irritation which may be contributing to poor appetite.    Be sure to talk with Dr. Fernandez regarding recent worsening of anxiety.  You indicated that meeting with a therapist previously was helpful.  Resources have been given to you.  Please use these for additional help.    Reach back out to Dr. Carrasco's office about rescheduling previously planned colonoscopy.        Resources and Activities for older adults near [place] and [place], along with information about the Agency on Aging for [facility] [place]:       Activities for Older Adults  - [facility]: Offers adult day care and activities for seniors in [place].  - [facility]: Provides opportunities for seniors to engage in meaningful volunteer work in [place].  - [facility]: A senior living community in [place] that offers recreational activities and social events.    Therapists and Counselors  - [facility]: Provides mental health support and counseling services in [place].  - [facility]: Offers in-home care services and companionship for seniors in [place].    Case Management Services  - [facility]: Provides comprehensive case management services for older adults in [place].  - [facility] Agency on Aging**: Offers care management, adult day services, and in-home meals.    Ag[facility] for [facility]  - **[facility] Agency on Aging: Located at [address], [place]. They provide services such as care management, caregiver support, protective services, and the SHARE Program for  affordable housing.      Safe Discharge Plan   This writer discussed the patients current presentation and recommended discharge plan with Dr. Diane Zelaya MD. They agree with the patient being discharged at this time with referrals and/or information about: hotline / warm line numbers; walk-in clinic information; local outpatient resource list for therapists / counselors, psychologists, psychiatrists, and partial programs; and, online / internet resources and support groups.      Advised to utilize natural and existing supports. Advised for safety planning and effective coping skills. Advised to return to ED if necessary.       National Suicide Hotline: 1-187.635.8549  Crisis Text Line: Text Connect to 689444      The patient was Instructed to follow up with their PCP and/or established providers. (Place dates for appt.)     Continue medications as prescribed.      This writer and the patient completed a safety plan.  The patient was provided with a copy of their safety plan with encouragement to utilize the plan following discharge.      In addition, the patient was instructed to call [facility] crisis, other crisis services, 911 or to go to the nearest ER immediately if their situation changes at any time.      This writer discussed discharge plans with the patient, who agrees with and understands the discharge plans.         SAFETY PLAN    Warning Signs (thoughts, images, mood, behavior, situations) of a potential crisis:      Thoughts  Excessive worrying or rumination  Suicidal ideation or thoughts of self-harm  Delusions or hallucinations  Inability to concentrate or make decisions  Obsessive or intrusive thoughts  Catastrophic thinking or negative self-talk  Feeling hopeless or helpless  Feeling disconnected from reality  Feeling like a burden to others  Feeling like life is meaningless    Images  Visualizations of self-harm or violence  Flashbacks of traumatic events  Seeing things that  aren't there  Distorted perceptions of reality  Recurrent nightmares or night terrors  Seeing oneself in a negative or distorted light    Mood  Extreme sadness or depression  Intense anger or irritability  Excessive anxiety or fear  Numbness or detachment  Euphoria or alba  Rapid mood swings  Feeling overwhelmed or hopeless  Feeling empty or void  Increased sensitivity to rejection or criticism  Loss of interest in activities once enjoyed    Behavior  Withdrawal from social activities  Changes in sleep patterns  Changes in appetite or eating habits  Neglect of personal hygiene  Increase in substance use  Reckless or impulsive behavior  Agitation or restlessness  Isolation or avoidance of others  Difficulty controlling emotions  Engaging in self-harm behaviors    Situations  Significant life stressors (e.g., job loss, divorce, death of a loved one)  History of trauma or abuse  Chronic illness or pain  Financial difficulties  Relationship problems  Legal troubles  Discrimination or prejudice  Natural disasters or other crises  Feeling overwhelmed or unable to cope  Experiencing a major life transition       Coping Skills (what can I do to take my mind off the problem, or to keep myself safe):     Coping Skills with Apps and Websites    Grounding Techniques  Deep breathing: Many meditation apps like Calm, Headspace, and Insight Timer offer guided deep breathing exercises.  Mindfulness: Apps like Calm, Headspace, and Smiling Mind provide mindfulness meditations and exercises.  Sensory focus: No specific apps, but encourage engaging the senses directly, like noticing textures, sounds, or smells in the environment.    Physical Activity  Exercise: Fitness apps like [employer], TargetingMantra, and [employer] provide workout routines and tracking.  Yoga: Apps like Yoga Studio, Down Dog, and Anthony Moves offer yoga classes for different levels.  Sports: Consider joining local sports teams or clubs.    Social Connection  Spending  time with loved ones: No specific roseanne, but encourage face-to-face interaction.  Joining support groups: Apps like Change Lane can help find local support groups. Online support groups are available on platforms like ponUp and Playful Data.    Healthy Lifestyle  Balanced diet: Nutrition apps like MyFitnessPal, Cronometer, and Lose It! can help track food intake.  Sufficient sleep: Sleep tracking apps like Sleep Cycle, Sleep Score, and AutoSleep can monitor sleep patterns.  Limiting caffeine and alcohol: No specific apps, but encourage awareness of consumption and moderation.    Relaxation Techniques  Progressive muscle relaxation: Many meditation and relaxation apps offer guided progressive muscle relaxation exercises.  Meditation: Apps like Calm, Headspace, and Insight Timer provide meditation practices.    Time Management  Prioritizing tasks: Productivity apps like Todoist, Trello, and Asana can help organize and prioritize tasks.  Setting realistic goals: Goal-setting apps like Strides and Habitica can assist in creating and tracking goals.    Problem-Solving  Breaking down problems: Mind mapping apps like MindMeister and Coggle can help visualize problems and solutions.  Seeking support: Therapy apps like [employer] and Data Marketplace offer professional guidance. Support groups can be found through [place] or online platforms.    Distraction Techniques  Hobbies: Explore various hobby-related apps or websites based on individual interests (e.g., art, music, hari, cooking).  Creative outlets: Apps like [employer] (for drawing), Pyth[employer] (for music), and AtriCure (for design) can stimulate creativity.    Seeking Professional Help  Therapy: Apps like [employer], Data Marketplace, and Data Marketplace offer online therapy.  Medication: Consult with a psychiatrist or primary care physician.    Self-Care  Spending time in nature: No specific roseanne, but encourage outdoor activities.  Taking breaks: Time management apps can help schedule  breaks.  Practicing self-compassion: Mindfulness and meditation apps can support self-compassion.    Remember, these apps and websites are tools to assist in coping. Professional help is essential for addressing underlying issues.       Outside Support (who can I reach out to for support and help): Daughter, Son and       National Suicide Prevention Hotline:  988      [facility] [phone] - Crisis   [facility] [phone] - LVF Crisis/Mobile Crisis   [phone] - [facility] Crisis   [facility]: [phone]  [facility]: [phone]   [facility] [phone] - Crisis   [facility] [phone] - Crisis     [facility] [phone] - Crisis   [facility] [phone] - Crisis   [phone] - Peer Support Talk Line (1-9pm daily)  [phone] - Teen Support Talk Line (1-9pm daily)  [phone] - [facility] [phone]- Crisis    [facility] [phone] - Crisis   [facility] [phone] - Crisis    [facility]) [phone] - [facility] Crisis

## 2025-03-22 NOTE — ED NOTES
Multiple attempts for BAT, patient unable to complete. Provider made aware.      Salvatore Hunt RN  03/22/25 1288

## 2025-03-22 NOTE — CASE MANAGEMENT
A 79-year-old female with a past medical history of depression and anxiety presented to the emergency department reporting exacerbated anxiety, persistent loneliness, and anhedonia following a recent relocation to a 55+ community. Her daughter participated in the assessment at the patient's request. The patient was cooperative, demonstrated good insight into her current emotional state, and described significant social withdrawal, remaining in her nightgown and avoiding community activities. She expressed concern for her , who she reported was also experiencing depressive symptoms due to his perceived inability to alleviate her distress. The daughter corroborated the patient's report, noting multiple recent medication changes with limited efficacy and highlighting increased evening anxiety, raising concerns for the patient's well-being. The patient expressed a profound sense of hopelessness, though she denied suicidal ideation or intent. While initially hesitant, she acknowledged the need for increased support and was receptive to discussing inpatient psychiatric hospitalization with her family. A previous therapist senior living had left her without ongoing mental health care. The patient elected to return home to discuss inpatient treatment with her  and daughter, with the understanding that they would return the following day if she agreed to admission. Community resources, including the Agency on Aging and case management services, were discussed to facilitate ongoing support. A safety plan was established, and the patient and her daughter agreed to the proposed course of action.      Pt is a 79 y.o. female who was brought to the ED with   Chief Complaint   Patient presents with    Dehydration     Pt has been sick with diarrhea but that has  improved. Pt is talking about multiple recent er visits for multiple concerns. Today's concern is dry mouth     Intake Assessment completed, Safety risk  Assessment completed      Ching Guillory

## 2025-03-23 ENCOUNTER — HOSPITAL ENCOUNTER (EMERGENCY)
Facility: HOSPITAL | Age: 80
End: 2025-03-24
Attending: EMERGENCY MEDICINE
Payer: COMMERCIAL

## 2025-03-23 DIAGNOSIS — F41.9 ANXIETY: Primary | ICD-10-CM

## 2025-03-23 DIAGNOSIS — Z00.8 MEDICAL CLEARANCE FOR PSYCHIATRIC ADMISSION: ICD-10-CM

## 2025-03-23 DIAGNOSIS — F32.A DEPRESSION: ICD-10-CM

## 2025-03-23 LAB
ALBUMIN SERPL BCG-MCNC: 4 G/DL (ref 3.5–5)
ALP SERPL-CCNC: 98 U/L (ref 34–104)
ALT SERPL W P-5'-P-CCNC: 18 U/L (ref 7–52)
ANION GAP SERPL CALCULATED.3IONS-SCNC: 9 MMOL/L (ref 4–13)
APAP SERPL-MCNC: <2 UG/ML (ref 10–20)
AST SERPL W P-5'-P-CCNC: 18 U/L (ref 13–39)
BASOPHILS # BLD AUTO: 0.03 THOUSANDS/ÂΜL (ref 0–0.1)
BASOPHILS NFR BLD AUTO: 0 % (ref 0–1)
BILIRUB SERPL-MCNC: 0.74 MG/DL (ref 0.2–1)
BILIRUB UR QL STRIP: NEGATIVE
BUN SERPL-MCNC: 16 MG/DL (ref 5–25)
CALCIUM SERPL-MCNC: 9.3 MG/DL (ref 8.4–10.2)
CHLORIDE SERPL-SCNC: 111 MMOL/L (ref 96–108)
CLARITY UR: CLEAR
CO2 SERPL-SCNC: 18 MMOL/L (ref 21–32)
COLOR UR: COLORLESS
CREAT SERPL-MCNC: 1.31 MG/DL (ref 0.6–1.3)
EOSINOPHIL # BLD AUTO: 0.1 THOUSAND/ÂΜL (ref 0–0.61)
EOSINOPHIL NFR BLD AUTO: 1 % (ref 0–6)
ERYTHROCYTE [DISTWIDTH] IN BLOOD BY AUTOMATED COUNT: 14.6 % (ref 11.6–15.1)
ETHANOL SERPL-MCNC: 19 MG/DL
GFR SERPL CREATININE-BSD FRML MDRD: 39 ML/MIN/1.73SQ M
GLUCOSE SERPL-MCNC: 178 MG/DL (ref 65–140)
GLUCOSE SERPL-MCNC: 189 MG/DL (ref 65–140)
GLUCOSE UR STRIP-MCNC: ABNORMAL MG/DL
HCT VFR BLD AUTO: 36.2 % (ref 34.8–46.1)
HGB BLD-MCNC: 12 G/DL (ref 11.5–15.4)
HGB UR QL STRIP.AUTO: NEGATIVE
IMM GRANULOCYTES # BLD AUTO: 0.04 THOUSAND/UL (ref 0–0.2)
IMM GRANULOCYTES NFR BLD AUTO: 0 % (ref 0–2)
KETONES UR STRIP-MCNC: NEGATIVE MG/DL
LEUKOCYTE ESTERASE UR QL STRIP: NEGATIVE
LYMPHOCYTES # BLD AUTO: 2.26 THOUSANDS/ÂΜL (ref 0.6–4.47)
LYMPHOCYTES NFR BLD AUTO: 25 % (ref 14–44)
MCH RBC QN AUTO: 27.3 PG (ref 26.8–34.3)
MCHC RBC AUTO-ENTMCNC: 33.1 G/DL (ref 31.4–37.4)
MCV RBC AUTO: 83 FL (ref 82–98)
MONOCYTES # BLD AUTO: 0.63 THOUSAND/ÂΜL (ref 0.17–1.22)
MONOCYTES NFR BLD AUTO: 7 % (ref 4–12)
NEUTROPHILS # BLD AUTO: 6.11 THOUSANDS/ÂΜL (ref 1.85–7.62)
NEUTS SEG NFR BLD AUTO: 67 % (ref 43–75)
NITRITE UR QL STRIP: NEGATIVE
NRBC BLD AUTO-RTO: 0 /100 WBCS
PH UR STRIP.AUTO: 5.5 [PH]
PLATELET # BLD AUTO: 245 THOUSANDS/UL (ref 149–390)
PMV BLD AUTO: 11.4 FL (ref 8.9–12.7)
POTASSIUM SERPL-SCNC: 3.9 MMOL/L (ref 3.5–5.3)
PROT SERPL-MCNC: 7.3 G/DL (ref 6.4–8.4)
PROT UR STRIP-MCNC: NEGATIVE MG/DL
RBC # BLD AUTO: 4.39 MILLION/UL (ref 3.81–5.12)
SALICYLATES SERPL-MCNC: <5 MG/DL (ref 3–20)
SODIUM SERPL-SCNC: 138 MMOL/L (ref 135–147)
SP GR UR STRIP.AUTO: 1 (ref 1–1.03)
T4 FREE SERPL-MCNC: 0.8 NG/DL (ref 0.61–1.12)
TSH SERPL DL<=0.05 MIU/L-ACNC: 5.08 UIU/ML (ref 0.45–4.5)
UROBILINOGEN UR STRIP-ACNC: <2 MG/DL
WBC # BLD AUTO: 9.17 THOUSAND/UL (ref 4.31–10.16)

## 2025-03-23 PROCEDURE — 99284 EMERGENCY DEPT VISIT MOD MDM: CPT

## 2025-03-23 PROCEDURE — 96372 THER/PROPH/DIAG INJ SC/IM: CPT

## 2025-03-23 PROCEDURE — 99285 EMERGENCY DEPT VISIT HI MDM: CPT | Performed by: EMERGENCY MEDICINE

## 2025-03-23 PROCEDURE — 84443 ASSAY THYROID STIM HORMONE: CPT | Performed by: EMERGENCY MEDICINE

## 2025-03-23 PROCEDURE — 82948 REAGENT STRIP/BLOOD GLUCOSE: CPT

## 2025-03-23 PROCEDURE — 93005 ELECTROCARDIOGRAM TRACING: CPT

## 2025-03-23 PROCEDURE — 80143 DRUG ASSAY ACETAMINOPHEN: CPT | Performed by: EMERGENCY MEDICINE

## 2025-03-23 PROCEDURE — 84439 ASSAY OF FREE THYROXINE: CPT | Performed by: EMERGENCY MEDICINE

## 2025-03-23 PROCEDURE — 80179 DRUG ASSAY SALICYLATE: CPT | Performed by: EMERGENCY MEDICINE

## 2025-03-23 PROCEDURE — 36415 COLL VENOUS BLD VENIPUNCTURE: CPT | Performed by: EMERGENCY MEDICINE

## 2025-03-23 PROCEDURE — 82077 ASSAY SPEC XCP UR&BREATH IA: CPT | Performed by: EMERGENCY MEDICINE

## 2025-03-23 PROCEDURE — 85025 COMPLETE CBC W/AUTO DIFF WBC: CPT | Performed by: EMERGENCY MEDICINE

## 2025-03-23 PROCEDURE — 80053 COMPREHEN METABOLIC PANEL: CPT | Performed by: EMERGENCY MEDICINE

## 2025-03-23 RX ORDER — LOSARTAN POTASSIUM 25 MG/1
25 TABLET ORAL DAILY
Status: DISCONTINUED | OUTPATIENT
Start: 2025-03-24 | End: 2025-03-24 | Stop reason: HOSPADM

## 2025-03-23 RX ORDER — PANTOPRAZOLE SODIUM 40 MG/1
40 TABLET, DELAYED RELEASE ORAL DAILY
Status: DISCONTINUED | OUTPATIENT
Start: 2025-03-24 | End: 2025-03-24 | Stop reason: HOSPADM

## 2025-03-23 RX ORDER — UBIDECARENONE 30 MG
200 CAPSULE ORAL DAILY
Status: DISCONTINUED | OUTPATIENT
Start: 2025-03-23 | End: 2025-03-24 | Stop reason: HOSPADM

## 2025-03-23 RX ORDER — TORSEMIDE 10 MG/1
10 TABLET ORAL DAILY
Status: DISCONTINUED | OUTPATIENT
Start: 2025-03-24 | End: 2025-03-24 | Stop reason: HOSPADM

## 2025-03-23 RX ORDER — INSULIN LISPRO 100 [IU]/ML
8 INJECTION, SOLUTION INTRAVENOUS; SUBCUTANEOUS
Status: DISCONTINUED | OUTPATIENT
Start: 2025-03-23 | End: 2025-03-24 | Stop reason: HOSPADM

## 2025-03-23 RX ORDER — INSULIN LISPRO 100 [IU]/ML
8 INJECTION, SOLUTION INTRAVENOUS; SUBCUTANEOUS
Status: DISCONTINUED | OUTPATIENT
Start: 2025-03-24 | End: 2025-03-24 | Stop reason: HOSPADM

## 2025-03-23 RX ORDER — INSULIN GLARGINE 100 [IU]/ML
24 INJECTION, SOLUTION SUBCUTANEOUS
Status: DISCONTINUED | OUTPATIENT
Start: 2025-03-24 | End: 2025-03-24 | Stop reason: HOSPADM

## 2025-03-23 RX ORDER — INSULIN LISPRO 100 [IU]/ML
1-6 INJECTION, SOLUTION INTRAVENOUS; SUBCUTANEOUS
Status: DISCONTINUED | OUTPATIENT
Start: 2025-03-23 | End: 2025-03-24 | Stop reason: HOSPADM

## 2025-03-23 RX ORDER — VENLAFAXINE HYDROCHLORIDE 37.5 MG/1
37.5 CAPSULE, EXTENDED RELEASE ORAL DAILY
Status: DISCONTINUED | OUTPATIENT
Start: 2025-03-24 | End: 2025-03-24 | Stop reason: HOSPADM

## 2025-03-23 RX ORDER — PRAVASTATIN SODIUM 40 MG
40 TABLET ORAL
Status: DISCONTINUED | OUTPATIENT
Start: 2025-03-23 | End: 2025-03-24 | Stop reason: HOSPADM

## 2025-03-23 RX ORDER — ASPIRIN 81 MG/1
81 TABLET, CHEWABLE ORAL DAILY
Status: DISCONTINUED | OUTPATIENT
Start: 2025-03-24 | End: 2025-03-24 | Stop reason: HOSPADM

## 2025-03-23 RX ORDER — OLANZAPINE 5 MG/1
10 TABLET, ORALLY DISINTEGRATING ORAL ONCE
Status: COMPLETED | OUTPATIENT
Start: 2025-03-23 | End: 2025-03-23

## 2025-03-23 RX ORDER — LEVOTHYROXINE SODIUM 75 UG/1
75 TABLET ORAL DAILY
Status: DISCONTINUED | OUTPATIENT
Start: 2025-03-24 | End: 2025-03-24 | Stop reason: HOSPADM

## 2025-03-23 RX ORDER — ALPRAZOLAM 0.25 MG
0.5 TABLET ORAL 2 TIMES DAILY PRN
Status: DISCONTINUED | OUTPATIENT
Start: 2025-03-23 | End: 2025-03-24 | Stop reason: HOSPADM

## 2025-03-23 RX ORDER — TRAZODONE HYDROCHLORIDE 50 MG/1
50 TABLET ORAL ONCE
Status: COMPLETED | OUTPATIENT
Start: 2025-03-23 | End: 2025-03-23

## 2025-03-23 RX ORDER — DILTIAZEM HYDROCHLORIDE 300 MG/1
300 CAPSULE, COATED, EXTENDED RELEASE ORAL DAILY
Status: DISCONTINUED | OUTPATIENT
Start: 2025-03-24 | End: 2025-03-24 | Stop reason: HOSPADM

## 2025-03-23 RX ADMIN — Medication 200 MG: at 19:48

## 2025-03-23 RX ADMIN — Medication 6 MG: at 23:39

## 2025-03-23 RX ADMIN — PRAVASTATIN SODIUM 40 MG: 40 TABLET ORAL at 18:37

## 2025-03-23 RX ADMIN — ALPRAZOLAM 0.5 MG: 0.25 TABLET ORAL at 21:23

## 2025-03-23 RX ADMIN — TRAZODONE HYDROCHLORIDE 50 MG: 50 TABLET ORAL at 23:39

## 2025-03-23 RX ADMIN — OLANZAPINE 10 MG: 5 TABLET, ORALLY DISINTEGRATING ORAL at 18:38

## 2025-03-23 RX ADMIN — INSULIN LISPRO 1 UNITS: 100 INJECTION, SOLUTION INTRAVENOUS; SUBCUTANEOUS at 18:42

## 2025-03-23 NOTE — ED ATTENDING ATTESTATION
"3/23/2025  I, Pollo Franz, , saw and evaluated the patient. I have discussed the patient with the resident/non-physician practitioner and agree with the resident's/non-physician practitioner's findings, Plan of Care, and MDM as documented in the resident's/non-physician practitioner's note, except where noted. All available labs and Radiology studies were reviewed.  I was present for key portions of any procedure(s) performed by the resident/non-physician practitioner and I was immediately available to provide assistance.       At this point I agree with the current assessment done in the Emergency Department.  I have conducted an independent evaluation of this patient a history and physical is as follows:    Patient is a 79-year-old female with a history of anxiety and depression who presents with worsening anxiety.  Patient states there are multiple stressors.  She states that she has in a over 55 community and feels as though the other residents are more sick and older than she is.  She also states that she cannot sleep at night because her  snores.  Her medications were changed to Effexor and Xanax a couple of months ago but she has been having worsening anxiety and depression.  She was evaluated yesterday by crisis and patient decided to go home.  She presents once again today with her daughter and would like to talk to crisis once again.    On exam, patient is in no acute distress.  Heart is regular rate and rhythm.  Breath sounds normal.  No hallucinations or delusions.  She denies SI or HI.    Awaiting labs, crisis evaluation. Likely 201. No criteria for 302. Patient signed out to Dr Hicks.     Portions of the above record have been created with voice recognition software.  Occasional wrong word or \"sound alike\" substitutions may have occurred due to the inherent limitations of voice recognition software.  Read the chart carefully and recognize, using context, where substitutions may have " occurred.      ED Course         Critical Care Time  Procedures

## 2025-03-23 NOTE — ED PROVIDER NOTES
Time reflects when diagnosis was documented in both MDM as applicable and the Disposition within this note       Time User Action Codes Description Comment    3/23/2025 10:57 PM Saad Babin Add [F41.9] Anxiety     3/23/2025 10:57 PM Saad Babin Add [F32.A] Depression           ED Disposition       ED Disposition   Transfer to Behavioral Health Condition   --    Date/Time   Sun Mar 23, 2025 10:57 PM    Comment   Yajaira Marsh should be transferred out to [facility] and has been medically cleared.               Assessment & Plan       Medical Decision Making  79-year-old female presents emergency department with chief complaint of anxiety and depression.  Patient is requesting psychiatric evaluation for her symptoms.  No complaints of suicidal/homicidal ideation or visual/auditory hallucinations.  Obtained screening labs prior to evaluation for behavioral health evaluation.  Obtain laboratory analysis which showed elevation of ethanol, not clinically intoxicated upon my evaluation.  Improving creatinine from laboratory analysis yesterday.  Patient provided with home medication as well as Zyprexa and trazodone due to complaints of insomnia.  Patient medically cleared at this time for evaluation by behavioral health.  Patient is a to Dr. Arellano, please see their note for completion of patient's care.    Amount and/or Complexity of Data Reviewed  Labs: ordered. Decision-making details documented in ED Course.    Risk  OTC drugs.  Prescription drug management.  Decision regarding hospitalization.        ED Course as of 03/24/25 0203   Sun Mar 23, 2025   1830 CBC and differential  Reassuring, within normal limits     1837 Creatinine(!): 1.31  Improved from yesterday   2230 Patient is medically cleared at this time for evaluation by behavioral health.       Medications   insulin glargine (LANTUS) subcutaneous injection 24 Units 0.24 mL (has no administration in time range)   insulin lispro (HumALOG/ADMELOG)  100 units/mL subcutaneous injection 8 Units (has no administration in time range)   insulin lispro (HumALOG/ADMELOG) 100 units/mL subcutaneous injection 8 Units (has no administration in time range)   insulin lispro (HumALOG/ADMELOG) 100 units/mL subcutaneous injection 8 Units (0 Units Subcutaneous Hold 3/23/25 1837)   insulin lispro (HumALOG/ADMELOG) 100 units/mL subcutaneous injection 1-6 Units (1 Units Subcutaneous Given 3/23/25 1842)   ALPRAZolam (XANAX) tablet 0.5 mg (0.5 mg Oral Given 3/23/25 2123)   aspirin chewable tablet 81 mg (has no administration in time range)   Cholecalciferol (VITAMIN D3) tablet 2,000 Units (has no administration in time range)   co-enzyme Q-10 capsule 200 mg (200 mg Oral Given 3/23/25 1948)   diltiazem (CARDIZEM CD) 24 hr capsule 300 mg (has no administration in time range)   isosorbide mononitrate (IMDUR) 24 hr tablet 90 mg (has no administration in time range)   levothyroxine tablet 75 mcg (has no administration in time range)   losartan (COZAAR) tablet 25 mg (has no administration in time range)   pantoprazole (PROTONIX) EC tablet 40 mg (has no administration in time range)   pravastatin (PRAVACHOL) tablet 40 mg (40 mg Oral Given 3/23/25 1837)   torsemide (DEMADEX) tablet 10 mg (has no administration in time range)   venlafaxine (EFFEXOR-XR) 24 hr capsule 37.5 mg (has no administration in time range)   melatonin tablet 6 mg (6 mg Oral Given 3/23/25 2339)   OLANZapine (ZyPREXA ZYDIS) dispersible tablet 10 mg (10 mg Oral Given 3/23/25 1838)   traZODone (DESYREL) tablet 50 mg (50 mg Oral Given 3/23/25 2339)       ED Risk Strat Scores                            SBIRT 22yo+      Flowsheet Row Most Recent Value   Initial Alcohol Screen: US AUDIT-C     1. How often do you have a drink containing alcohol? 0 Filed at: 03/23/2025 1700   2. How many drinks containing alcohol do you have on a typical day you are drinking?  0 Filed at: 03/23/2025 1700   3b. FEMALE Any Age, or MALE 65+: How  often do you have 4 or more drinks on one occassion? 0 Filed at: 03/23/2025 1700   Audit-C Score 0 Filed at: 03/23/2025 1700   GERMAN: How many times in the past year have you...    Used an illegal drug or used a prescription medication for non-medical reasons? Never Filed at: 03/23/2025 1700                            History of Present Illness       Chief Complaint   Patient presents with    Psychiatric Evaluation     Was seen yesterday and presented options on care. Went home is thinking she wants placement but wanted to talk to crisis again before making a decision.        Past Medical History:   Diagnosis Date    Acute embolism and thrombosis of unspecified deep veins of unspecified lower extremity (HCC)     Last Assessed:  5/18/17    Anemia     Anosmia     Anxiety     Arthritis     Asthma     Back pain     Bilateral macular retinal edema     CAD (coronary artery disease)     Cataract     Cervical disc herniation     Cervical radiculopathy     Cervical spinal stenosis     Cervical spondylolysis     Chronic kidney disease     Chronic mastoiditis     Colon polyp     Complex endometrial hyperplasia     Depression     Diabetes mellitus (HCC)     Disease of thyroid gland     DVT (deep venous thrombosis) (HCC)     DVT (deep venous thrombosis) (HCC) 06/16/2017    Fibromyalgia     Fibromyalgia, primary     Hyperlipidemia     Hypertension     Hypothyroid     Kidney stone     Lumbar radiculopathy     Neck pain     Obese     PONV (postoperative nausea and vomiting)     Shingles 07/01/2021    Spinal stenosis     Stomach ulcer     Thyroid disease     Toxic metabolic encephalopathy 02/11/2025    Vascular claudication (HCC) 12/11/2017      Past Surgical History:   Procedure Laterality Date    BACK SURGERY      CARPAL TUNNEL RELEASE      CATARACT EXTRACTION      CHOLECYSTECTOMY      COLONOSCOPY      CORONARY ANGIOPLASTY      CORONARY ARTERY BYPASS GRAFT      CYSTOSCOPY N/A 6/20/2017    Procedure: CYSTOSCOPY;  Surgeon: Tacho  MD Clayton;  Location: [facility];  Service: Gynecology Oncology    CYSTOSCOPY      WA LAPS TOTAL HYSTERECT 250 GM/< W/RMVL TUBE/OVARY N/A 2017    Procedure: ROBOTIC HYSTERECTOMY; BILATERAL SALPINO-OOPHERECTOMY; umbilical hernia repair.;  Surgeon: Tacho Arnold MD;  Location: [facility];  Service: Gynecology Oncology    WA REPAIR FIRST ABDOMINAL WALL HERNIA N/A 2022    Procedure: REPAIR HERNIA INCISIONAL;  Surgeon: Horacio Scherer DO;  Location: [facility];  Service: General    TONSILECTOMY AND ADNOIDECTOMY      TONSILLECTOMY      UMBILICAL HERNIA REPAIR        Family History   Problem Relation Age of Onset    Arthritis Mother     Leukemia Mother     Other Mother         Anxiety, major depressive disorder, recurrent episode with atypical features    Coronary artery disease Father         Heart problem    Diabetes Father     Other Father         Infectious disease    Alzheimer's disease Maternal Grandmother     Other Maternal Grandfather         Heart problem    Other Daughter         Anxiety, major depressive disorder, recurrent episode with atypical features    Alcohol abuse Other         Grandparent    Cancer Family     Diabetes Family     Hypertension Family     Other Family         Reported prior back trouble, thyroid disorder      Social History     Tobacco Use    Smoking status: Former     Current packs/day: 0.00     Average packs/day: 1 pack/day for 6.0 years (6.0 ttl pk-yrs)     Types: Cigarettes     Start date:      Quit date:      Years since quittin.2     Passive exposure: Never    Smokeless tobacco: Never    Tobacco comments:     Smoked about a half a pack for about 4 yrs.  Quite about 50 yrs ago.   Vaping Use    Vaping status: Never Used   Substance Use Topics    Alcohol use: Never    Drug use: No      E-Cigarette/Vaping    E-Cigarette Use Never User       E-Cigarette/Vaping Substances    Nicotine No     THC No     CBD No     Flavoring No     Other No     Unknown No        I have reviewed and agree with the history as documented.     (Yajaira Marsh) Yajaira Marsh is a 79 y.o. female     They presented to the emergency department on March 23, 2025. Patient presents with:  Psychiatric Evaluation: Was seen yesterday and presented options on care. Went home is thinking she wants placement but wanted to talk to crisis again before making a decision.       The patient states that she has been having extreme issues with anxiety and depression.  Patient states that she was recently here and evaluated in the emergency department, however went home to discuss with her family and made the decision to come back in order to be treated.  Patient denies any suicidal or homicidal ideation, visual or auditory hallucinations.  Patient notes that she has been having extreme difficulty with sleep noting that her  snores frequently which causes her distress . Patient denies any other complaint at this time.              Review of Systems   Constitutional:  Negative for chills and fever.   HENT:  Negative for ear pain and sore throat.    Eyes:  Negative for pain and visual disturbance.   Respiratory:  Negative for cough and shortness of breath.    Cardiovascular:  Negative for chest pain and palpitations.   Gastrointestinal:  Negative for abdominal pain and vomiting.   Genitourinary:  Negative for dysuria and hematuria.   Musculoskeletal:  Negative for arthralgias and back pain.   Skin:  Negative for color change and rash.   Neurological:  Negative for seizures and syncope.   Psychiatric/Behavioral:  Positive for sleep disturbance. Negative for suicidal ideas. The patient is nervous/anxious.    All other systems reviewed and are negative.          Objective       ED Triage Vitals   Temperature Pulse Blood Pressure Respirations SpO2 Patient Position - Orthostatic VS   03/23/25 1631 03/23/25 1631 03/23/25 1631 03/23/25 1631 03/23/25 1631 03/23/25 1631   98.3 °F (36.8 °C) 80 (!) 198/83 20 97  % Sitting      Temp Source Heart Rate Source BP Location FiO2 (%) Pain Score    03/23/25 1631 03/23/25 1631 03/23/25 1631 -- 03/23/25 2345    Oral Monitor Right arm  No Pain      Vitals      Date and Time Temp Pulse SpO2 Resp BP Pain Score FACES Pain Rating User   03/23/25 2345 -- 85 99 % 20 183/74 No Pain -- JA   03/23/25 1631 98.3 °F (36.8 °C) 80 97 % 20 198/83 -- -- AW            Physical Exam  Vitals and nursing note reviewed.   Constitutional:       General: She is in acute distress (mild).      Appearance: Normal appearance.   HENT:      Head: Normocephalic and atraumatic.      Right Ear: External ear normal.      Left Ear: External ear normal.      Nose: Nose normal.      Mouth/Throat:      Mouth: Mucous membranes are moist.   Eyes:      Conjunctiva/sclera: Conjunctivae normal.   Cardiovascular:      Rate and Rhythm: Normal rate and regular rhythm.   Pulmonary:      Effort: Pulmonary effort is normal. No respiratory distress.      Breath sounds: Normal breath sounds.   Abdominal:      General: Abdomen is flat. Bowel sounds are normal.      Tenderness: There is no abdominal tenderness. There is no guarding or rebound.   Musculoskeletal:         General: Normal range of motion.      Cervical back: Normal range of motion.   Skin:     General: Skin is warm and dry.      Capillary Refill: Capillary refill takes less than 2 seconds.   Neurological:      Mental Status: She is alert. Mental status is at baseline.   Psychiatric:         Attention and Perception: Attention normal.         Mood and Affect: Mood is anxious.         Speech: Speech normal.         Behavior: Behavior is cooperative.         Thought Content: Thought content normal. Thought content does not include homicidal or suicidal plan.         Results Reviewed       Procedure Component Value Units Date/Time    T4, free [[id number]]  (Normal) Collected: 03/23/25 1815    Lab Status: Final result Specimen: Blood from Arm, Right Updated: 03/23/25 1122      "Free T4 0.80 ng/dL     Narrative:        \"Therapeutic range for patients medicated with thyroid disorders: 0.61-1.24 ng/dL.\"    UA w Reflex to Microscopic w Reflex to Culture [[id number]]  (Abnormal) Collected: 03/23/25 2157    Lab Status: Final result Specimen: Urine, Other Updated: 03/23/25 2227     Color, UA Colorless     Clarity, UA Clear     Specific Gravity, UA 1.004     pH, UA 5.5     Leukocytes, UA Negative     Nitrite, UA Negative     Protein, UA Negative mg/dl      Glucose, UA Trace mg/dl      Ketones, UA Negative mg/dl      Urobilinogen, UA <2.0 mg/dl      Bilirubin, UA Negative     Occult Blood, UA Negative    TSH, 3rd generation with Free T4 reflex [[id number]]  (Abnormal) Collected: 03/23/25 1815    Lab Status: Final result Specimen: Blood from Arm, Right Updated: 03/23/25 1852     TSH 3RD GENERATON 5.080 uIU/mL     Comprehensive metabolic panel [[id number]]  (Abnormal) Collected: 03/23/25 1815    Lab Status: Final result Specimen: Blood from Arm, Right Updated: 03/23/25 1836     Sodium 138 mmol/L      Potassium 3.9 mmol/L      Chloride 111 mmol/L      CO2 18 mmol/L      ANION GAP 9 mmol/L      BUN 16 mg/dL      Creatinine 1.31 mg/dL      Glucose 189 mg/dL      Calcium 9.3 mg/dL      AST 18 U/L      ALT 18 U/L      Alkaline Phosphatase 98 U/L      Total Protein 7.3 g/dL      Albumin 4.0 g/dL      Total Bilirubin 0.74 mg/dL      eGFR 39 ml/min/1.73sq m     Narrative:      National Kid[employer] Disease Foundation guidelines for Chronic Kidney Disease (CKD):     Stage 1 with normal or high GFR (GFR > 90 mL/min/1.73 square meters)    Stage 2 Mild CKD (GFR = 60-89 mL/min/1.73 square meters)    Stage 3A Moderate CKD (GFR = 45-59 mL/min/1.73 square meters)    Stage 3B Moderate CKD (GFR = 30-44 mL/min/1.73 square meters)    Stage 4 Severe CKD (GFR = 15-29 mL/min/1.73 square meters)    Stage 5 End Stage CKD (GFR <15 mL/min/1.73 square meters)  Note: GFR calculation is accurate only with a steady state creatinine    " Salicylate level [[id number]]  (Normal) Collected: 03/23/25 1815    Lab Status: Final result Specimen: Blood from Arm, Right Updated: 03/23/25 1836     Salicylate Lvl <5 mg/dL     Acetaminophen level-If concentration is detectable, please discuss with medical  on call. [[id number]]  (Abnormal) Collected: 03/23/25 1815    Lab Status: Final result Specimen: Blood from Arm, Right Updated: 03/23/25 1836     Acetaminophen Level <2 ug/mL     Ethanol [[id number]]  (Abnormal) Collected: 03/23/25 1815    Lab Status: Final result Specimen: Blood from Arm, Right Updated: 03/23/25 1835     Ethanol Lvl 19 mg/dL     Fingerstick Glucose (POCT) [[id number]]  (Abnormal) Collected: 03/23/25 1832    Lab Status: Final result Specimen: Blood Updated: 03/23/25 1833     POC Glucose 178 mg/dl     CBC and differential [[id number]] Collected: 03/23/25 1815    Lab Status: Final result Specimen: Blood from Arm, Right Updated: 03/23/25 1825     WBC 9.17 Thousand/uL      RBC 4.39 Million/uL      Hemoglobin 12.0 g/dL      Hematocrit 36.2 %      MCV 83 fL      MCH 27.3 pg      MCHC 33.1 g/dL      RDW 14.6 %      MPV 11.4 fL      Platelets 245 Thousands/uL      nRBC 0 /100 WBCs      Segmented % 67 %      Immature Grans % 0 %      Lymphocytes % 25 %      Monocytes % 7 %      Eosinophils Relative 1 %      Basophils Relative 0 %      Absolute Neutrophils 6.11 Thousands/µL      Absolute Immature Grans 0.04 Thousand/uL      Absolute Lymphocytes 2.26 Thousands/µL      Absolute Monocytes 0.63 Thousand/µL      Eosinophils Absolute 0.10 Thousand/µL      Basophils Absolute 0.03 Thousands/µL             No orders to display       Procedures    ED Medication and Procedure Management   Prior to Admission Medications   Prescriptions Last Dose Informant Patient Reported? Taking?   ALPRAZolam (XANAX) 0.5 mg tablet   No No   Sig: TAKE 1 TABLET (0.5 MG TOTAL) BY MOUTH 2 (TWO) TIMES A DAY AS NEEDED FOR ANXIETY.   Coenzyme Q10 (Co Q-10) 200 MG CAPS  Self Yes No    Sig: Take by mouth in the morning   Continuous Glucose Sensor (Dexcom G7 Sensor)   No No   Sig: Use 1 Device every 10 days   Toujeo SoloStar 300 units/mL CONCENTRATED U-300 injection pen (1-unit dial)   No No   Sig: Inject 50 Units under the skin in the morning   aspirin 81 mg chewable tablet   Yes No   Sig: Chew 81 mg daily   cholecalciferol (VITAMIN D3) 1,000 units tablet  Self Yes No   Sig: Take 2,000 Units by mouth daily   diltiazem (TIAZAC) 300 MG 24 hr capsule  Self No No   Sig: Take 1 capsule (300 mg total) by mouth daily Tiddylt   insulin aspart (NovoLOG FlexPen) 100 UNIT/ML injection pen   No No   Sig: Use 15u tidac plus 2u/50 above 150mg/dL; max dose 70u/day   isosorbide mononitrate (IMDUR) 30 mg 24 hr tablet   Yes No   Sig: Take 90 mg by mouth daily   levothyroxine 75 mcg tablet   No No   Sig: Take 1 tablet (75 mcg total) by mouth daily   losartan (COZAAR) 100 MG tablet   Yes No   Sig: Take 25 mg by mouth daily   pantoprazole (PROTONIX) 40 mg tablet   No No   Sig: Take 1 tablet (40 mg total) by mouth daily   rosuvastatin (CRESTOR) 20 MG tablet  Self No No   Sig: Take 1 tablet (20 mg total) by mouth every evening   torsemide (DEMADEX) 20 mg tablet  Self No No   Sig: TAKE 0.5 TABLETS (10 MG TOTAL) BY MOUTH AS NEEDED (TAKE UP TO 2 TIMES PER WEEK FOR LEG SWELLING AS NEEDED)   venlafaxine (EFFEXOR-XR) 37.5 mg 24 hr capsule   Yes No   Sig: Take 37.5 mg by mouth daily      Facility-Administered Medications: None     Patient's Medications   Discharge Prescriptions    No medications on file     No discharge procedures on file.  ED SEPSIS DOCUMENTATION   Time reflects when diagnosis was documented in both MDM as applicable and the Disposition within this note       Time User Action Codes Description Comment    3/23/2025 10:57 PM Saad Babin [F41.9] Anxiety     3/23/2025 10:57 PM Saad Babin [F32.A] Depression                  Saad Babin MD  03/24/25 0204

## 2025-03-23 NOTE — LETTER
[facility]   [facility]  [place] [address]  Dept: [phone]      EMTALA TRANSFER CONSENT    NAME Yajaira Marsh                          1945                              MRN [id number]    I have been informed of my rights regarding examination, treatment, and transfer   by Dr. Jace Olivares DO    Benefits: Specialized equipment and/or services available at the receiving facility (Include comment)________________________    Risks: Potential for delay in receiving treatment      Consent for Transfer:  I acknowledge that my medical condition has been evaluated and explained to me by the emergency department physician or other qualified medical person and/or my attending physician, who has recommended that I be transferred to the service of  Accepting Physician: Dr Werner attending. Matheus Wilson to place orders at Accepting Facility Name, City & State : [address], [place]. The above potential benefits of such transfer, the potential risks associated with such transfer, and the probable risks of not being transferred have been explained to me, and I fully understand them.  The doctor has explained that, in my case, the benefits of transfer outweigh the risks.  I agree to be transferred.    I authorize the performance of emergency medical procedures and treatments upon me in both transit and upon arrival at the receiving facility.  Additionally, I authorize the release of any and all medical records to the receiving facility and request they be transported with me, if possible.  I understand that the safest mode of transportation during a medical emergency is an ambulance and that the Hospital advocates the use of this mode of transport. Risks of traveling to the receiving facility by car, including absence of medical control, life sustaining equipment, such as oxygen, and medical personnel has been explained to me and I fully  understand them.    (HEIDY CORRECT BOX BELOW)  [  ]  I consent to the stated transfer and to be transported by ambulance/helicopter.  [  ]  I consent to the stated transfer, but refuse transportation by ambulance and accept full responsibility for my transportation by car.  I understand the risks of non-ambulance transfers and I exonerate the Hospital and its staff from any deterioration in my condition that results from this refusal.    X___________________________________________    DATE  25  TIME________  Signature of patient or legally responsible individual signing on patient behalf           RELATIONSHIP TO PATIENT_________________________                  Provider Certification    NAME Yajaira Marsh                       [facility] 1945                              MRN [id number]    A medical screening exam was performed on the above named patient.  Based on the examination:    Condition Necessitating Transfer The primary encounter diagnosis was Anxiety. Diagnoses of Depression and Medical clearance for psychiatric admission were also pertinent to this visit.    Patient Condition: The patient has been stabilized such that within reasonable medical probability, no material deterioration of the patient condition or the condition of the unborn child(salvatore) is likely to result from the transfer    Reason for Transfer: Level of Care needed not available at this facility    Transfer Requirements: Facility [address]   Space available and qualified personnel available for treatment as acknowledged by Romelia Bartlett  Agreed to accept transfer and to provide appropriate medical treatment as acknowledged by       Dr Werner attending. Matheus Wilson to place orders  Appropriate medical records of the examination and treatment of the patient are provided at the time of transfer   STAFF INITIAL WHEN COMPLETED _______  Transfer will be performed by qualified personnel from ________________   and appropriate transfer equipment as required, including the use of necessary and appropriate life support measures.    Provider Certification: I have examined the patient and explained the following risks and benefits of being transferred/refusing transfer to the patient/family:  The patient is stable for psychiatric transfer because they are medically stable, and is protected from harming him/herself or others during transport      Based on these reasonable risks and benefits to the patient and/or the unborn child(salvatore), and based upon the information available at the time of the patient’s examination, I certify that the medical benefits reasonably to be expected from the provision of appropriate medical treatments at another medical facility outweigh the increasing risks, if any, to the individual’s medical condition, and in the case of labor to the unborn child, from effecting the transfer.    X____________________________________________ DATE 03/24/25        TIME_______      ORIGINAL - SEND TO MEDICAL RECORDS   COPY - SEND WITH PATIENT DURING TRANSFER

## 2025-03-24 ENCOUNTER — HOSPITAL ENCOUNTER (INPATIENT)
Facility: HOSPITAL | Age: 80
LOS: 4 days | Discharge: HOME | End: 2025-03-28
Attending: STUDENT IN AN ORGANIZED HEALTH CARE EDUCATION/TRAINING PROGRAM | Admitting: PSYCHIATRY & NEUROLOGY
Payer: COMMERCIAL

## 2025-03-24 VITALS
RESPIRATION RATE: 18 BRPM | SYSTOLIC BLOOD PRESSURE: 134 MMHG | OXYGEN SATURATION: 97 % | TEMPERATURE: 98.3 F | DIASTOLIC BLOOD PRESSURE: 79 MMHG | HEART RATE: 80 BPM

## 2025-03-24 DIAGNOSIS — R63.0 DECREASED APPETITE: ICD-10-CM

## 2025-03-24 DIAGNOSIS — Z00.8 MEDICAL CLEARANCE FOR PSYCHIATRIC ADMISSION: ICD-10-CM

## 2025-03-24 DIAGNOSIS — F32.0 CURRENT MILD EPISODE OF MAJOR DEPRESSIVE DISORDER WITHOUT PRIOR EPISODE (HCC): ICD-10-CM

## 2025-03-24 DIAGNOSIS — F32.A DEPRESSIVE DISORDER: Primary | ICD-10-CM

## 2025-03-24 DIAGNOSIS — E78.2 MIXED HYPERLIPIDEMIA: Chronic | ICD-10-CM

## 2025-03-24 DIAGNOSIS — Z79.4 TYPE 2 DIABETES MELLITUS, WITH LONG-TERM CURRENT USE OF INSULIN (HCC): Chronic | ICD-10-CM

## 2025-03-24 DIAGNOSIS — I10 ESSENTIAL HYPERTENSION: ICD-10-CM

## 2025-03-24 DIAGNOSIS — G31.84 MCI (MILD COGNITIVE IMPAIRMENT): ICD-10-CM

## 2025-03-24 DIAGNOSIS — E03.9 HYPOTHYROIDISM, UNSPECIFIED TYPE: ICD-10-CM

## 2025-03-24 DIAGNOSIS — E11.9 TYPE 2 DIABETES MELLITUS, WITH LONG-TERM CURRENT USE OF INSULIN (HCC): Chronic | ICD-10-CM

## 2025-03-24 DIAGNOSIS — M47.26 OSTEOARTHRITIS OF SPINE WITH RADICULOPATHY, LUMBAR REGION: ICD-10-CM

## 2025-03-24 PROBLEM — I65.23 ATHEROSCLEROSIS OF BOTH CAROTID ARTERIES: Status: ACTIVE | Noted: 2023-07-10

## 2025-03-24 PROBLEM — E87.6 HYPOKALEMIA: Status: ACTIVE | Noted: 2025-03-24

## 2025-03-24 PROBLEM — N28.9 RENAL INSUFFICIENCY: Status: ACTIVE | Noted: 2023-07-10

## 2025-03-24 LAB
ANION GAP SERPL CALCULATED.3IONS-SCNC: 8 MMOL/L (ref 4–13)
ATRIAL RATE: 62 BPM
ATRIAL RATE: 66 BPM
BUN SERPL-MCNC: 14 MG/DL (ref 5–25)
CALCIUM SERPL-MCNC: 9.4 MG/DL (ref 8.4–10.2)
CHLORIDE SERPL-SCNC: 114 MMOL/L (ref 96–108)
CO2 SERPL-SCNC: 18 MMOL/L (ref 21–32)
CREAT SERPL-MCNC: 1.38 MG/DL (ref 0.6–1.3)
GFR SERPL CREATININE-BSD FRML MDRD: 36 ML/MIN/1.73SQ M
GLUCOSE SERPL-MCNC: 182 MG/DL (ref 65–140)
GLUCOSE SERPL-MCNC: 189 MG/DL (ref 65–140)
GLUCOSE SERPL-MCNC: 205 MG/DL (ref 65–140)
GLUCOSE SERPL-MCNC: 216 MG/DL (ref 65–140)
GLUCOSE SERPL-MCNC: 217 MG/DL (ref 65–140)
GLUCOSE SERPL-MCNC: 230 MG/DL (ref 65–140)
P AXIS: 65 DEGREES
P AXIS: 67 DEGREES
POTASSIUM SERPL-SCNC: 3.3 MMOL/L (ref 3.5–5.3)
PR INTERVAL: 182 MS
PR INTERVAL: 188 MS
QRS AXIS: -29 DEGREES
QRS AXIS: -36 DEGREES
QRSD INTERVAL: 88 MS
QRSD INTERVAL: 92 MS
QT INTERVAL: 408 MS
QT INTERVAL: 434 MS
QTC INTERVAL: 427 MS
QTC INTERVAL: 440 MS
SODIUM SERPL-SCNC: 140 MMOL/L (ref 135–147)
T WAVE AXIS: -11 DEGREES
T WAVE AXIS: -4 DEGREES
VENTRICULAR RATE: 62 BPM
VENTRICULAR RATE: 66 BPM

## 2025-03-24 PROCEDURE — 82948 REAGENT STRIP/BLOOD GLUCOSE: CPT

## 2025-03-24 PROCEDURE — 96372 THER/PROPH/DIAG INJ SC/IM: CPT

## 2025-03-24 PROCEDURE — 93010 ELECTROCARDIOGRAM REPORT: CPT | Performed by: INTERNAL MEDICINE

## 2025-03-24 PROCEDURE — 36415 COLL VENOUS BLD VENIPUNCTURE: CPT | Performed by: STUDENT IN AN ORGANIZED HEALTH CARE EDUCATION/TRAINING PROGRAM

## 2025-03-24 PROCEDURE — 80048 BASIC METABOLIC PNL TOTAL CA: CPT | Performed by: STUDENT IN AN ORGANIZED HEALTH CARE EDUCATION/TRAINING PROGRAM

## 2025-03-24 RX ORDER — BENZTROPINE MESYLATE 1 MG/ML
1 INJECTION, SOLUTION INTRAMUSCULAR; INTRAVENOUS
Status: DISCONTINUED | OUTPATIENT
Start: 2025-03-24 | End: 2025-03-28 | Stop reason: HOSPADM

## 2025-03-24 RX ORDER — DILTIAZEM HYDROCHLORIDE 180 MG/1
180 CAPSULE, COATED, EXTENDED RELEASE ORAL DAILY
Status: DISCONTINUED | OUTPATIENT
Start: 2025-03-25 | End: 2025-03-25

## 2025-03-24 RX ORDER — OLANZAPINE 5 MG/1
5 TABLET ORAL
Status: CANCELLED | OUTPATIENT
Start: 2025-03-24

## 2025-03-24 RX ORDER — ASPIRIN 81 MG/1
81 TABLET, CHEWABLE ORAL DAILY
Status: DISCONTINUED | OUTPATIENT
Start: 2025-03-25 | End: 2025-03-28 | Stop reason: HOSPADM

## 2025-03-24 RX ORDER — HYDROXYZINE HYDROCHLORIDE 25 MG/1
25 TABLET, FILM COATED ORAL
Status: CANCELLED | OUTPATIENT
Start: 2025-03-24

## 2025-03-24 RX ORDER — POLYETHYLENE GLYCOL 3350 17 G/17G
17 POWDER, FOR SOLUTION ORAL DAILY PRN
Status: CANCELLED | OUTPATIENT
Start: 2025-03-24

## 2025-03-24 RX ORDER — ISOSORBIDE MONONITRATE 30 MG/1
90 TABLET, EXTENDED RELEASE ORAL DAILY
Status: DISCONTINUED | OUTPATIENT
Start: 2025-03-25 | End: 2025-03-25

## 2025-03-24 RX ORDER — INSULIN GLARGINE 100 [IU]/ML
24 INJECTION, SOLUTION SUBCUTANEOUS
Status: DISCONTINUED | OUTPATIENT
Start: 2025-03-25 | End: 2025-03-24

## 2025-03-24 RX ORDER — INSULIN GLARGINE 100 [IU]/ML
5 INJECTION, SOLUTION SUBCUTANEOUS
Status: DISCONTINUED | OUTPATIENT
Start: 2025-03-24 | End: 2025-03-25

## 2025-03-24 RX ORDER — INSULIN LISPRO 100 [IU]/ML
8 INJECTION, SOLUTION INTRAVENOUS; SUBCUTANEOUS
Status: CANCELLED | OUTPATIENT
Start: 2025-03-24

## 2025-03-24 RX ORDER — ASPIRIN 81 MG/1
81 TABLET, CHEWABLE ORAL DAILY
Status: CANCELLED | OUTPATIENT
Start: 2025-03-25

## 2025-03-24 RX ORDER — PROPRANOLOL HYDROCHLORIDE 10 MG/1
5 TABLET ORAL EVERY 8 HOURS PRN
Status: DISCONTINUED | OUTPATIENT
Start: 2025-03-24 | End: 2025-03-28 | Stop reason: HOSPADM

## 2025-03-24 RX ORDER — DILTIAZEM HYDROCHLORIDE 300 MG/1
300 CAPSULE, COATED, EXTENDED RELEASE ORAL DAILY
Status: DISCONTINUED | OUTPATIENT
Start: 2025-03-25 | End: 2025-03-24 | Stop reason: SDUPTHER

## 2025-03-24 RX ORDER — INSULIN LISPRO 100 [IU]/ML
8 INJECTION, SOLUTION INTRAVENOUS; SUBCUTANEOUS
Status: DISCONTINUED | OUTPATIENT
Start: 2025-03-25 | End: 2025-03-24

## 2025-03-24 RX ORDER — PROPRANOLOL HYDROCHLORIDE 10 MG/1
5 TABLET ORAL EVERY 8 HOURS PRN
Status: CANCELLED | OUTPATIENT
Start: 2025-03-24

## 2025-03-24 RX ORDER — ACETAMINOPHEN 325 MG/1
975 TABLET ORAL EVERY 6 HOURS PRN
Status: DISCONTINUED | OUTPATIENT
Start: 2025-03-24 | End: 2025-03-28 | Stop reason: HOSPADM

## 2025-03-24 RX ORDER — PANTOPRAZOLE SODIUM 40 MG/1
40 TABLET, DELAYED RELEASE ORAL DAILY
Status: CANCELLED | OUTPATIENT
Start: 2025-03-25

## 2025-03-24 RX ORDER — ACETAMINOPHEN 325 MG/1
650 TABLET ORAL EVERY 4 HOURS PRN
Status: CANCELLED | OUTPATIENT
Start: 2025-03-24

## 2025-03-24 RX ORDER — INSULIN LISPRO 100 [IU]/ML
8 INJECTION, SOLUTION INTRAVENOUS; SUBCUTANEOUS
Status: DISCONTINUED | OUTPATIENT
Start: 2025-03-24 | End: 2025-03-24

## 2025-03-24 RX ORDER — POLYETHYLENE GLYCOL 3350 17 G/17G
17 POWDER, FOR SOLUTION ORAL DAILY PRN
Status: DISCONTINUED | OUTPATIENT
Start: 2025-03-24 | End: 2025-03-28 | Stop reason: HOSPADM

## 2025-03-24 RX ORDER — HYDROXYZINE HYDROCHLORIDE 25 MG/1
50 TABLET, FILM COATED ORAL
Status: CANCELLED | OUTPATIENT
Start: 2025-03-24

## 2025-03-24 RX ORDER — LORAZEPAM 1 MG/1
1 TABLET ORAL
Status: CANCELLED | OUTPATIENT
Start: 2025-03-24

## 2025-03-24 RX ORDER — TORSEMIDE 10 MG/1
10 TABLET ORAL DAILY
Status: CANCELLED | OUTPATIENT
Start: 2025-03-25

## 2025-03-24 RX ORDER — INSULIN GLARGINE 100 [IU]/ML
24 INJECTION, SOLUTION SUBCUTANEOUS
Status: CANCELLED | OUTPATIENT
Start: 2025-03-25

## 2025-03-24 RX ORDER — INSULIN LISPRO 100 [IU]/ML
1-6 INJECTION, SOLUTION INTRAVENOUS; SUBCUTANEOUS
Status: CANCELLED | OUTPATIENT
Start: 2025-03-24

## 2025-03-24 RX ORDER — BENZTROPINE MESYLATE 0.5 MG/1
0.5 TABLET ORAL
Status: CANCELLED | OUTPATIENT
Start: 2025-03-24

## 2025-03-24 RX ORDER — TORSEMIDE 5 MG/1
10 TABLET ORAL DAILY
Status: DISCONTINUED | OUTPATIENT
Start: 2025-03-25 | End: 2025-03-25

## 2025-03-24 RX ORDER — BENZTROPINE MESYLATE 1 MG/ML
1 INJECTION, SOLUTION INTRAMUSCULAR; INTRAVENOUS
Status: CANCELLED | OUTPATIENT
Start: 2025-03-24

## 2025-03-24 RX ORDER — LOSARTAN POTASSIUM 25 MG/1
25 TABLET ORAL DAILY
Status: DISCONTINUED | OUTPATIENT
Start: 2025-03-25 | End: 2025-03-25

## 2025-03-24 RX ORDER — TRAZODONE HYDROCHLORIDE 50 MG/1
50 TABLET ORAL
Status: DISCONTINUED | OUTPATIENT
Start: 2025-03-24 | End: 2025-03-25

## 2025-03-24 RX ORDER — VENLAFAXINE HYDROCHLORIDE 37.5 MG/1
37.5 CAPSULE, EXTENDED RELEASE ORAL DAILY
Status: CANCELLED | OUTPATIENT
Start: 2025-03-25

## 2025-03-24 RX ORDER — ATORVASTATIN CALCIUM 40 MG/1
TABLET, FILM COATED ORAL
COMMUNITY
Start: 2025-03-13 | End: 2025-03-28

## 2025-03-24 RX ORDER — ACETAMINOPHEN 325 MG/1
975 TABLET ORAL EVERY 6 HOURS PRN
Status: CANCELLED | OUTPATIENT
Start: 2025-03-24

## 2025-03-24 RX ORDER — OLANZAPINE 10 MG/2ML
5 INJECTION, POWDER, FOR SOLUTION INTRAMUSCULAR
Status: DISCONTINUED | OUTPATIENT
Start: 2025-03-24 | End: 2025-03-28 | Stop reason: HOSPADM

## 2025-03-24 RX ORDER — INSULIN LISPRO 100 [IU]/ML
1-6 INJECTION, SOLUTION INTRAVENOUS; SUBCUTANEOUS
Status: DISCONTINUED | OUTPATIENT
Start: 2025-03-24 | End: 2025-03-28 | Stop reason: HOSPADM

## 2025-03-24 RX ORDER — INSULIN LISPRO 100 [IU]/ML
8 INJECTION, SOLUTION INTRAVENOUS; SUBCUTANEOUS
Status: CANCELLED | OUTPATIENT
Start: 2025-03-25

## 2025-03-24 RX ORDER — OLANZAPINE 2.5 MG/1
2.5 TABLET, FILM COATED ORAL
Status: DISCONTINUED | OUTPATIENT
Start: 2025-03-24 | End: 2025-03-28 | Stop reason: HOSPADM

## 2025-03-24 RX ORDER — PANTOPRAZOLE SODIUM 40 MG/1
40 TABLET, DELAYED RELEASE ORAL DAILY
Status: DISCONTINUED | OUTPATIENT
Start: 2025-03-25 | End: 2025-03-28 | Stop reason: HOSPADM

## 2025-03-24 RX ORDER — MAGNESIUM HYDROXIDE/ALUMINUM HYDROXICE/SIMETHICONE 120; 1200; 1200 MG/30ML; MG/30ML; MG/30ML
30 SUSPENSION ORAL EVERY 4 HOURS PRN
Status: DISCONTINUED | OUTPATIENT
Start: 2025-03-24 | End: 2025-03-28 | Stop reason: HOSPADM

## 2025-03-24 RX ORDER — OLANZAPINE 5 MG/1
5 TABLET ORAL
Status: DISCONTINUED | OUTPATIENT
Start: 2025-03-24 | End: 2025-03-28 | Stop reason: HOSPADM

## 2025-03-24 RX ORDER — DIMENHYDRINATE 50 MG
200 TABLET ORAL DAILY
Status: DISCONTINUED | OUTPATIENT
Start: 2025-03-25 | End: 2025-03-24

## 2025-03-24 RX ORDER — VENLAFAXINE HYDROCHLORIDE 37.5 MG/1
37.5 CAPSULE, EXTENDED RELEASE ORAL DAILY
Status: DISCONTINUED | OUTPATIENT
Start: 2025-03-25 | End: 2025-03-25

## 2025-03-24 RX ORDER — LOSARTAN POTASSIUM 25 MG/1
25 TABLET ORAL DAILY
Status: DISCONTINUED | OUTPATIENT
Start: 2025-03-25 | End: 2025-03-24

## 2025-03-24 RX ORDER — ACETAMINOPHEN 325 MG/1
650 TABLET ORAL EVERY 4 HOURS PRN
Status: DISCONTINUED | OUTPATIENT
Start: 2025-03-24 | End: 2025-03-28 | Stop reason: HOSPADM

## 2025-03-24 RX ORDER — OLANZAPINE 2.5 MG/1
2.5 TABLET, FILM COATED ORAL
Status: CANCELLED | OUTPATIENT
Start: 2025-03-24

## 2025-03-24 RX ORDER — LORAZEPAM 1 MG/1
1 TABLET ORAL
Status: DISCONTINUED | OUTPATIENT
Start: 2025-03-24 | End: 2025-03-28 | Stop reason: HOSPADM

## 2025-03-24 RX ORDER — TRAZODONE HYDROCHLORIDE 50 MG/1
50 TABLET ORAL
Status: CANCELLED | OUTPATIENT
Start: 2025-03-24

## 2025-03-24 RX ORDER — DILTIAZEM HYDROCHLORIDE 120 MG/1
120 CAPSULE, COATED, EXTENDED RELEASE ORAL DAILY
Status: DISCONTINUED | OUTPATIENT
Start: 2025-03-25 | End: 2025-03-25

## 2025-03-24 RX ORDER — DILTIAZEM HYDROCHLORIDE 300 MG/1
300 CAPSULE, COATED, EXTENDED RELEASE ORAL DAILY
Status: CANCELLED | OUTPATIENT
Start: 2025-03-25

## 2025-03-24 RX ORDER — VITAMIN B COMPLEX
200 TABLET ORAL DAILY
Status: CANCELLED | OUTPATIENT
Start: 2025-03-25

## 2025-03-24 RX ORDER — HYDROXYZINE HYDROCHLORIDE 25 MG/1
25 TABLET, FILM COATED ORAL
Status: DISCONTINUED | OUTPATIENT
Start: 2025-03-24 | End: 2025-03-28 | Stop reason: HOSPADM

## 2025-03-24 RX ORDER — BENZTROPINE MESYLATE 0.5 MG/1
0.5 TABLET ORAL
Status: DISCONTINUED | OUTPATIENT
Start: 2025-03-24 | End: 2025-03-28 | Stop reason: HOSPADM

## 2025-03-24 RX ORDER — LEVOTHYROXINE SODIUM 75 UG/1
75 TABLET ORAL DAILY
Status: CANCELLED | OUTPATIENT
Start: 2025-03-25

## 2025-03-24 RX ORDER — MAGNESIUM HYDROXIDE/ALUMINUM HYDROXICE/SIMETHICONE 120; 1200; 1200 MG/30ML; MG/30ML; MG/30ML
30 SUSPENSION ORAL EVERY 4 HOURS PRN
Status: CANCELLED | OUTPATIENT
Start: 2025-03-24

## 2025-03-24 RX ORDER — LEVOTHYROXINE SODIUM 75 UG/1
75 TABLET ORAL DAILY
Status: DISCONTINUED | OUTPATIENT
Start: 2025-03-25 | End: 2025-03-28 | Stop reason: HOSPADM

## 2025-03-24 RX ORDER — POTASSIUM CHLORIDE 1500 MG/1
40 TABLET, EXTENDED RELEASE ORAL ONCE
Status: COMPLETED | OUTPATIENT
Start: 2025-03-24 | End: 2025-03-24

## 2025-03-24 RX ORDER — HYDROXYZINE HYDROCHLORIDE 50 MG/1
50 TABLET, FILM COATED ORAL
Status: DISCONTINUED | OUTPATIENT
Start: 2025-03-24 | End: 2025-03-28 | Stop reason: HOSPADM

## 2025-03-24 RX ORDER — OLANZAPINE 10 MG/2ML
5 INJECTION, POWDER, FOR SOLUTION INTRAMUSCULAR
Status: CANCELLED | OUTPATIENT
Start: 2025-03-24

## 2025-03-24 RX ORDER — PRAVASTATIN SODIUM 40 MG
40 TABLET ORAL
Status: CANCELLED | OUTPATIENT
Start: 2025-03-24

## 2025-03-24 RX ORDER — PRAVASTATIN SODIUM 40 MG
40 TABLET ORAL
Status: DISCONTINUED | OUTPATIENT
Start: 2025-03-24 | End: 2025-03-28 | Stop reason: HOSPADM

## 2025-03-24 RX ORDER — LORAZEPAM 2 MG/ML
1 INJECTION INTRAMUSCULAR
Status: DISCONTINUED | OUTPATIENT
Start: 2025-03-24 | End: 2025-03-28 | Stop reason: HOSPADM

## 2025-03-24 RX ORDER — LOSARTAN POTASSIUM 25 MG/1
25 TABLET ORAL DAILY
Status: CANCELLED | OUTPATIENT
Start: 2025-03-25

## 2025-03-24 RX ORDER — LORAZEPAM 2 MG/ML
1 INJECTION INTRAMUSCULAR
Status: CANCELLED | OUTPATIENT
Start: 2025-03-24

## 2025-03-24 RX ADMIN — LEVOTHYROXINE SODIUM 75 MCG: 0.07 TABLET ORAL at 08:34

## 2025-03-24 RX ADMIN — ISOSORBIDE MONONITRATE 90 MG: 60 TABLET, EXTENDED RELEASE ORAL at 08:33

## 2025-03-24 RX ADMIN — LOSARTAN POTASSIUM 25 MG: 25 TABLET, FILM COATED ORAL at 08:34

## 2025-03-24 RX ADMIN — Medication 3 MG: at 22:12

## 2025-03-24 RX ADMIN — INSULIN LISPRO 1 UNITS: 100 INJECTION, SOLUTION INTRAVENOUS; SUBCUTANEOUS at 09:20

## 2025-03-24 RX ADMIN — Medication 200 MG: at 08:33

## 2025-03-24 RX ADMIN — Medication 2000 UNITS: at 08:34

## 2025-03-24 RX ADMIN — INSULIN GLARGINE 5 UNITS: 100 INJECTION, SOLUTION SUBCUTANEOUS at 22:26

## 2025-03-24 RX ADMIN — PANTOPRAZOLE SODIUM 40 MG: 40 TABLET, DELAYED RELEASE ORAL at 08:34

## 2025-03-24 RX ADMIN — INSULIN LISPRO 8 UNITS: 100 INJECTION, SOLUTION INTRAVENOUS; SUBCUTANEOUS at 09:21

## 2025-03-24 RX ADMIN — INSULIN GLARGINE 24 UNITS: 100 INJECTION, SOLUTION SUBCUTANEOUS at 06:41

## 2025-03-24 RX ADMIN — DILTIAZEM HYDROCHLORIDE 300 MG: 300 CAPSULE, EXTENDED RELEASE ORAL at 08:33

## 2025-03-24 RX ADMIN — INSULIN LISPRO 1 UNITS: 100 INJECTION, SOLUTION INTRAVENOUS; SUBCUTANEOUS at 22:28

## 2025-03-24 RX ADMIN — TORSEMIDE 10 MG: 10 TABLET ORAL at 08:33

## 2025-03-24 RX ADMIN — PRAVASTATIN SODIUM 40 MG: 40 TABLET ORAL at 17:21

## 2025-03-24 RX ADMIN — INSULIN LISPRO 3 UNITS: 100 INJECTION, SOLUTION INTRAVENOUS; SUBCUTANEOUS at 17:47

## 2025-03-24 RX ADMIN — INSULIN LISPRO 8 UNITS: 100 INJECTION, SOLUTION INTRAVENOUS; SUBCUTANEOUS at 13:01

## 2025-03-24 RX ADMIN — ASPIRIN 81 MG CHEWABLE TABLET 81 MG: 81 TABLET CHEWABLE at 08:34

## 2025-03-24 RX ADMIN — INSULIN LISPRO 2 UNITS: 100 INJECTION, SOLUTION INTRAVENOUS; SUBCUTANEOUS at 13:01

## 2025-03-24 RX ADMIN — POTASSIUM CHLORIDE 40 MEQ: 1500 TABLET, EXTENDED RELEASE ORAL at 14:37

## 2025-03-24 RX ADMIN — VENLAFAXINE HYDROCHLORIDE 37.5 MG: 37.5 CAPSULE, EXTENDED RELEASE ORAL at 08:33

## 2025-03-24 NOTE — ASSESSMENT & PLAN NOTE
Lab Results   Component Value Date    EGFR 36 03/24/2025    EGFR 39 03/23/2025    EGFR 31 03/22/2025    CREATININE 1.38 (H) 03/24/2025    CREATININE 1.31 (H) 03/23/2025    CREATININE 1.57 (H) 03/22/2025   Baseline creatinine 1.5-1.7  Limit nephrotoxic agents and hypotension

## 2025-03-24 NOTE — ED CARE HANDOFF
Emergency Department Sign Out Note        Sign out and transfer of care from Dr. Arellano. See Separate Emergency Department note.     The patient, Yajaira Marsh, was evaluated by the previous provider for anxiety/depression, no SI/HI.    Workup Completed:  Medically cleared for placement     ED Course / Workup Pending (followup):  Needs to sign 201           Procedures  Medical Decision Making  Patient seen and evaluated by jeri, patient signed 201, PHYLICIA paperwork completed.     Patient transferred to [facility] behavioral [facility] unit.    Amount and/or Complexity of Data Reviewed  Labs: ordered.    Risk  OTC drugs.  Prescription drug management.  Decision regarding hospitalization.            Disposition  Final diagnoses:   Anxiety   Depression     Time reflects when diagnosis was documented in both MDM as applicable and the Disposition within this note       Time User Action Codes Description Comment    3/23/2025 10:57 PM Saad Babin [F41.9] Anxiety     3/23/2025 10:57 PM Saad Babin [F32.A] Depression     3/24/2025 11:07 AM Matheus Wilson Add [Z00.8] Medical clearance for psychiatric admission           ED Disposition       ED Disposition   Transfer to Behavioral Health Condition   --    Date/Time   Sun Mar 23, 2025 10:57 PM    Comment   Yajaira Marsh should be transferred out to [facility] and has been medically cleared.               MD Documentation      Flowsheet Row Most Recent Value   Patient Condition The patient has been stabilized such that within reasonable medical probability, no material deterioration of the patient condition or the condition of the unborn child(salvatore) is likely to result from the transfer   Reason for Transfer Level of Care needed not available at this facility   Benefits of Transfer Specialized equipment and/or services available at the receiving facility (Include comment)________________________   Risks of Transfer Potential for delay in receiving  treatment   Accepting Physician Dr Werner attending. Matheus Wilson to place orders   Accepting Facility Name, [address], [facility]    (Name & Tel number) Romelia Bartlett   Transported by (Company and Unit #) ________________   Sending MD Dr. Olivares   Provider Certification The patient is stable for psychiatric transfer because they are medically stable, and is protected from harming him/herself or others during transport          RN Documentation      Flowsheet Row Most Recent Value   Accepting Facility Name, [address], [facility]    (Name & Tel number) Romelia Bartlett   Medications Reviewed with Next Provider of Service --  [other]   Transport Mode Other (Comment)   Transported by (Company and Unit #) ________________   Level of Care Other (Comment)   Copies of Medical Records Sent Other (comment)   Patient Belongings Disposition Other (Comment)   Transfer Date 03/24/25   Transfer Time 1124          Follow-up Information    None       Discharge Medication List as of 3/24/2025  1:26 PM        CONTINUE these medications which have NOT CHANGED    Details   pantoprazole (PROTONIX) 40 mg tablet Take 1 tablet (40 mg total) by mouth daily, Starting Sat 3/22/2025, Until Mon 4/21/2025, Normal      ALPRAZolam (XANAX) 0.5 mg tablet TAKE 1 TABLET (0.5 MG TOTAL) BY MOUTH 2 (TWO) TIMES A DAY AS NEEDED FOR ANXIETY., Normal      aspirin 81 mg chewable tablet Chew 81 mg daily, Historical Med      cholecalciferol (VITAMIN D3) 1,000 units tablet Take 2,000 Units by mouth daily, Historical Med      Coenzyme Q10 (Co Q-10) 200 MG CAPS Take by mouth in the morning, Historical Med      Continuous Glucose Sensor (Dexcom G7 Sensor) Use 1 Device every 10 days, Starting Tue 3/18/2025, Normal      diltiazem (TIAZAC) 300 MG 24 hr capsule Take 1 capsule (300 mg total) by mouth daily Tiddylt, Starting Mon 8/19/2024, Normal      insulin aspart  (NovoLOG FlexPen) 100 UNIT/ML injection pen Use 15u tidac plus 2u/50 above 150mg/dL; max dose 70u/day, Normal      isosorbide mononitrate (IMDUR) 30 mg 24 hr tablet Take 90 mg by mouth daily, Historical Med      levothyroxine 75 mcg tablet Take 1 tablet (75 mcg total) by mouth daily, Starting Tue 3/18/2025, Normal      losartan (COZAAR) 100 MG tablet Take 25 mg by mouth daily, Historical Med      rosuvastatin (CRESTOR) 20 MG tablet Take 1 tablet (20 mg total) by mouth every evening, Starting Mon 8/19/2024, Normal      torsemide (DEMADEX) 20 mg tablet TAKE 0.5 TABLETS (10 MG TOTAL) BY MOUTH AS NEEDED (TAKE UP TO 2 TIMES PER WEEK FOR LEG SWELLING AS NEEDED), Starting Fri 5/31/2024, Normal      Toujeo SoloStar 300 units/mL CONCENTRATED U-300 injection pen (1-unit dial) Inject 50 Units under the skin in the morning, Starting Tue 3/18/2025, Normal      venlafaxine (EFFEXOR-XR) 37.5 mg 24 hr capsule Take 37.5 mg by mouth daily, Historical Med           No discharge procedures on file.       ED Provider  Electronically Signed by     Lou Ruiz MD  03/24/25 [id number]       Lou Ruiz MD  03/24/25 [id number]

## 2025-03-24 NOTE — CASE MANAGEMENT
A 79-year-old female presented to the emergency department with exacerbated anxiety, insomnia, and depressive symptoms. This presentation followed an ED/ DC visit the previous day, during which she, accompanied by her daughter, had expressed intent to pursue inpatient psychiatric treatment. Upon arrival today, the patient exhibited significant apprehension regarding hospitalization, verbalizing intense fear of confinement and separation from her  and pet. She displayed perseverative thought patterns centered on these anxieties. The patient's concerns were communicated to the attending physician and nursing staff, who addressed her apprehensions regarding invasive procedures, clarifying that only simple blood work was necessary, not an intravenous line. She expressed a preference for wearing her nightgown and duster rather than scrubs, reflecting a desire for familiar comfort. The patient agreed to proceed with the blood work and receive an anxiety medication, with the understanding that further therapeutic conversation would follow the completion of the labs.       Ching Guillory  Crisis Worker  Behavioral Health     Case Management      Signed     Date of Service: 3/22/2025  6:47 PM     Signed         A 79-year-old female with a past medical history of depression and anxiety presented to the emergency department reporting exacerbated anxiety, persistent loneliness, and anhedonia following a recent relocation to a [place] community. Her daughter participated in the assessment at the patient's request. The patient was cooperative, demonstrated good insight into her current emotional state, and described significant social withdrawal, remaining in her nightgown and avoiding community activities. She expressed concern for her , who she reported was also experiencing depressive symptoms due to his perceived inability to alleviate her distress. The daughter corroborated the patient's report, noting multiple recent  medication changes with limited efficacy and highlighting increased evening anxiety, raising concerns for the patient's well-being. The patient expressed a profound sense of hopelessness, though she denied suicidal ideation or intent. While initially hesitant, she acknowledged the need for increased support and was receptive to discussing inpatient psychiatric hospitalization with her family. A previous therapist longterm had left her without ongoing mental health care. The patient elected to return home to discuss inpatient treatment with her  and daughter, with the understanding that they would return the following day if she agreed to admission. Community resources, including the [facility] and case management services, were discussed to facilitate ongoing support. A safety plan was established, and the patient and her daughter agreed to the proposed course of action.        Pt is a 79 y.o. female who was brought to the ED with        Chief Complaint   Patient presents with    Dehydration       Pt has been sick with diarrhea but that has  improved. Pt is talking about multiple recent er visits for multiple concerns. Today's concern is dry mouth      Intake Assessment completed, Safety risk Assessment completed        Ching Guillory

## 2025-03-24 NOTE — ASSESSMENT & PLAN NOTE
Admission labs: CBC, CMP, lipid panel, TSH reviewed   Folate, B12, Vitamin D 25 hydroxy labs pending  Vitals stable   UA unremarkable   UDS not collected   EKG reveals NSR, 62 bpm   Patient is medically cleared for admission to [facility] and treatment of underlying psychiatric illness based on available results  Please contact [facility] with any questions or concerns

## 2025-03-24 NOTE — ED CARE HANDOFF
Emergency Department Sign Out Note        Sign out and transfer of care from Dr. Catsro. See Separate Emergency Department note.     The patient, Yajaira Marsh, was evaluated by the previous provider for anxiety and depression.    Workup Completed:  Temp:  [98.3 °F (36.8 °C)] 98.3 °F (36.8 °C)  HR:  [76-85] 76  BP: (165-198)/(65-83) 165/65  Resp:  [18-20] 18  SpO2:  [97 %-99 %] 99 %  O2 Device: None (Room air)  Recent Results (from the past 12 hours)   UA w Reflex to Microscopic w Reflex to Culture    Collection Time: 03/23/25  9:57 PM    Specimen: Urine, Other   Result Value Ref Range    Color, UA Colorless     Clarity, UA Clear     Specific Gravity, UA 1.004 1.003 - 1.030    pH, UA 5.5 4.5, 5.0, 5.5, 6.0, 6.5, 7.0, 7.5, 8.0    Leukocytes, UA Negative Negative    Nitrite, UA Negative Negative    Protein, UA Negative Negative mg/dl    Glucose, UA Trace (A) Negative mg/dl    Ketones, UA Negative Negative mg/dl    Urobilinogen, UA <2.0 <2.0 mg/dl mg/dl    Bilirubin, UA Negative Negative    Occult Blood, UA Negative Negative   Fingerstick Glucose (POCT)    Collection Time: 03/24/25  5:37 AM   Result Value Ref Range    POC Glucose 205 (H) 65 - 140 mg/dl   Basic metabolic panel    Collection Time: 03/24/25  5:54 AM   Result Value Ref Range    Sodium 140 135 - 147 mmol/L    Potassium 3.3 (L) 3.5 - 5.3 mmol/L    Chloride 114 (H) 96 - 108 mmol/L    CO2 18 (L) 21 - 32 mmol/L    ANION GAP 8 4 - 13 mmol/L    BUN 14 5 - 25 mg/dL    Creatinine 1.38 (H) 0.60 - 1.30 mg/dL    Glucose 217 (H) 65 - 140 mg/dL    Calcium 9.4 8.4 - 10.2 mg/dL    eGFR 36 ml/min/1.73sq m     No orders to display     XR chest 1 view portable  Result Date: 3/22/2025  Narrative: XR CHEST PORTABLE INDICATION: Dyspnea. COMPARISON: CXR and chest CT 2/11/2025. FINDINGS: Clear lungs. No pneumothorax or pleural effusion. Normal cardiomediastinal silhouette. CABG. Bones are unremarkable for age. Normal upper abdomen.     Impression: No acute cardiopulmonary  disease. Workstation performed: [id number]     CT head wo contrast  Result Date: 3/6/2025  Narrative: Clinical information: Altered mental status. Technique: Unenhanced CT scan of the brain was performed by department technique. Images were reviewed in soft tissue and bone algorithms with 2.5 mm axial sections, sagittal and coronal reformations. Comparison: CT dated 1/5/2025. Findings Brain Parenchyma: No large acute territorial infarction. Cerebral white matter hypoattenuation is nonspecific and likely related to microangiopathy. Ventricular system, basal cisterns and extra-axial spaces: Prominent, compatible with cerebral volume loss. Intracranial hemorrhage: None. Midline shift: None. Skull base & Calvarium: No depressed calvarial fracture. Atherosclerotic vascular calcifications are noted at the skull base. Paranasal sinuses and mastoid air cells: Opacification of the left frontal sinus and left anterior ethmoid air cells. Intrinsic hyperdensity may be secondary to fungal colonization or inspissated secretions. Visualized orbits: Bilateral lens replacement. Sella: Partially empty sella.    Impression: Impression: 1.  No acute intracranial hemorrhage or large acute territorial infarction. 2.  Cerebral volume loss and sequela of chronic microvascular ischemia. Workstation:[id number]    CT renal stone study abdomen pelvis wo contrast  Result Date: 3/6/2025  Narrative: History: Vomiting abdominal pain Exam: Nonenhanced CT of the abdomen and pelvis.   Technique: Using helical technique, axial images were obtained through the abdomen and pelvis. Coronal and sagittal reformations were performed.   Comparison:   Abdomen: Lung Bases: Normal. Liver: Normal Gallbladder/Bile ducts: Cholecystectomy. Spleen: Spleen not enlarged. Pancreas: No pancreatitis. Adrenal glands: Normal. Kidneys/Ureters: Multiple bilateral renal cyst. No renal stone or hydronephrosis. Bowel/Mesentery: Appendix not visualized. No bowel wall thickening or  bowel obstruction. Scattered diverticuli Lymph nodes: Normal. Vessels: Normal. Abdominal Wall: Normal. Pelvis: Hysterectomy Urinary Bladder: Normal. Lymph nodes:  Normal.   Bones: Degenerative change of the thoracolumbar spine.      Impression: Impression: No acute intra-abdominal pathology. No bowel obstruction. No renal stone or hydronephrosis. Cholecystectomy. Workstation:[id number]    XR chest portable  Result Date: 3/6/2025  Narrative: History: Cough, difficulty swallowing Technique: Single semierect portable AP frontal view of the chest Comparison: Chest CT January 12, 2023 Findings: Median sternotomy wires are unchanged. Cardiac silhouette and pulmonary vasculature are are upper normal, stable in size and contour. Mild right lung base interstitial coarsening, cicatrization is unchanged. Lungs and pleural spaces are otherwise clear. There is no pneumothorax. Bony thorax is intact.    Impression: IMPRESSION: Stable findings, including stable right infrahilar interstitial coarsening/cicatrization. No further acute radiographic cardiopulmonary process. Workstation:[id number]    CT abdomen pelvis with contrast  Result Date: 3/4/2025  Narrative: CT ABDOMEN AND PELVIS WITH IV CONTRAST INDICATION: Diarrhea, abdominal pain. . COMPARISON: CT 2/11/2025 and CT 8/26/2023 TECHNIQUE: CT examination of the abdomen and pelvis was performed. Multiplanar 2D reformatted images were created from the source data. This examination, like all CT scans performed in the [facility], was performed utilizing techniques to minimize radiation dose exposure, including the use of iterative reconstruction and automated exposure control. Radiation dose length product (DLP) for this visit: 829 mGy-cm IV Contrast: 70 mL of iohexol Enteric Contrast: Not administered. FINDINGS: ABDOMEN LOWER CHEST: Unchanged 6 mm right middle lobe nodule. The stability since at least 2023 suggest benign etiologies. Mild bibasilar atelectasis.  LIVER/BILIARY TREE: Unremarkable. GALLBLADDER: Post cholecystectomy. SPLEEN: Unremarkable. PANCREAS: Unremarkable. ADRENAL GLANDS: Unremarkable. KIDNEYS/URETERS: Simple appearing bilateral cysts. There is also a 1.4 cm proteinaceous lateral exophytic cyst arising from the interpolar right kidney. This measures 47 Hounsfield units postcontrast and 45 Hounsfield units on the prior noncontrast exam.  There are additional subcentimeter hypoattenuating renal lesion(s), too small to characterize but statistically likely benign, which do not warrant follow-up (Radiology June 2019). No hydronephrosis or urinary tract calculi. STOMACH AND BOWEL: The stomach and small bowel are unremarkable. There are scattered colonic diverticula, without evidence of acute inflammation. APPENDIX: No findings to suggest appendicitis. ABDOMINOPELVIC CAVITY: No ascites. No pneumoperitoneum. No lymphadenopathy. VESSELS: Unremarkable for patient's age. PELVIS REPRODUCTIVE ORGANS: Post hysterectomy. URINARY BLADDER: Unremarkable. ABDOMINAL WALL/INGUINAL REGIONS: Postsurgical changes of the anterior abdominal wall. Moderate rectus diastases. BONES: No acute fracture or suspicious osseous lesion.     Impression: 1.  No identifiable acute abnormality to account for the patient's clinical presentation. 2.  Please refer to the report body for description of other incidental, chronic and/or benign findings. Workstation performed: [id number]         Given:    Medication Administration - last 24 hours from 03/23/2025 0725 to 03/24/2025 0725         Date/Time Order Dose Route Action Action by     03/23/2025 1838 EDT OLANZapine (ZyPREXA ZYDIS) dispersible tablet 10 mg 10 mg Oral Given Nadine Cooper RN     03/24/2025 0641 EDT insulin glargine (LANTUS) subcutaneous injection 24 Units 0.24 mL 24 Units Subcutaneous Given Jonathon Fairchild RN     03/24/2025 0630 EDT insulin lispro (HumALOG/ADMELOG) 100 units/mL subcutaneous injection 8 Units 0 Units  Subcutaneous Hold Jonathon Fairchild RN     03/23/2025 1837 EDT insulin lispro (HumALOG/ADMELOG) 100 units/mL subcutaneous injection 8 Units 0 Units Subcutaneous Hold Nadine Cooper, RN     03/24/2025 0602 EDT insulin lispro (HumALOG/ADMELOG) 100 units/mL subcutaneous injection 1-6 Units 0 Units Subcutaneous Hold Jonathon Fairchild RN     03/23/2025 1842 EDT insulin lispro (HumALOG/ADMELOG) 100 units/mL subcutaneous injection 1-6 Units 1 Units Subcutaneous Given Nadine Cooper, GAMA     03/23/2025 2123 EDT ALPRAZolam (XANAX) tablet 0.5 mg 0.5 mg Oral Given Salvatore Hunt, GAMA     03/23/2025 1948 EDT co-enzyme Q-10 capsule 200 mg 200 mg Oral Given Heather Villar, GAMA     03/23/2025 1837 EDT pravastatin (PRAVACHOL) tablet 40 mg 40 mg Oral Given Nadine Cooper, GAMA     03/23/2025 2339 EDT melatonin tablet 6 mg 6 mg Oral Given Sharifa Delgado RN     03/23/2025 2339 EDT traZODone (DESYREL) tablet 50 mg 50 mg Oral Given Sharifa Delgado RN              ED Course / Workup Pending (followup):  Pending 201 by crisis.                                     Procedures  Medical Decision Making  Patient's case was signed out to me by Dr. Babin while pending signing a 201 for psychiatric evaluation.  Patient had no acute complaints while under my care.  At the end of my shift, patient's case was signed out to Dr. Ruiz while still pending signing a 201.    Amount and/or Complexity of Data Reviewed  Labs: ordered.    Risk  OTC drugs.  Prescription drug management.  Decision regarding hospitalization.            Disposition  Final diagnoses:   Anxiety   Depression     Time reflects when diagnosis was documented in both MDM as applicable and the Disposition within this note       Time User Action Codes Description Comment    3/23/2025 10:57 PM Saad Babin [F41.9] Anxiety     3/23/2025 10:57 PM Saad Babin [F32.A] Depression           ED Disposition       ED Disposition    Transfer to Behavioral Health    [facility]   --    Date/Time   Sun Mar 23, 2025 10:57 PM    Comment   Yajaira Marsh should be transferred out to [facility] and has been medically cleared.               Follow-up Information    None       Patient's Medications   Discharge Prescriptions    No medications on file     No discharge procedures on file.       ED Provider  Electronically Signed by     Jamey Arellano MD  03/24/25 [id number]

## 2025-03-24 NOTE — ED NOTES
Insurance Authorization for admission:   Phone call placed to [employer]  Phone number:      Spoke to Farzana     ** days approved.  Level of care: 201  Review on **.   Authorization # [id number]  Fax clinicals to         Eligibility Verification System checked - ([phone]).  Online system / automated system indicates: **    Insurance Authorization for Transportation:    Phone call placed to **.   Phone number **.   Spoke to **.   Authorization #: **

## 2025-03-24 NOTE — PLAN OF CARE
Problem: PAIN - ADULT  Goal: Verbalizes/displays adequate comfort level or baseline comfort level  Description: Interventions:- Encourage patient to monitor pain and request assistance- Assess pain using appropriate pain scale- Administer analgesics based on type and severity of pain and evaluate response- Implement non-pharmacological measures as appropriate and evaluate response- Consider cultural and social influences on pain and pain management- Notify physician/advanced practitioner if interventions unsuccessful or patient reports new pain  Outcome: Progressing     Problem: INFECTION - ADULT  Goal: Absence or prevention of progression during hospitalization  Description: INTERVENTIONS:- Assess and monitor for signs and symptoms of infection- Monitor lab/diagnostic results- Monitor all insertion sites, i.e. indwelling lines, tubes, and drains- Monitor endotracheal if appropriate and nasal secretions for changes in amount and color- [place] appropriate cooling/warming therapies per order- Administer medications as ordered- Instruct and encourage patient and family to use good hand hygiene technique- Identify and instruct in appropriate isolation precautions for identified infection/condition  Outcome: Progressing  Goal: Absence of fever/infection during neutropenic period  Description: INTERVENTIONS:- Monitor WBC  Outcome: Progressing     Problem: SAFETY ADULT  Goal: Patient will remain free of falls  Description: INTERVENTIONS:- Educate patient/family on patient safety including physical limitations- Instruct patient to call for assistance with activity - Consult OT/PT to assist with strengthening/mobility - Keep Call bell within reach- Keep bed low and locked with side rails adjusted as appropriate- Keep care items and personal belongings within reach- Initiate and maintain comfort rounds- Make Fall Risk Sign visible to staff- Offer Toileting every 2 Hours, in advance of need- Initiate/Maintain bedalarm-  Obtain necessary fall risk management equipment: Nonskid socks- Apply yellow socks and bracelet for high fall risk patients- Consider moving patient to room near nurses station  Outcome: Progressing  Goal: Maintain or return to baseline ADL function  Description: INTERVENTIONS:-  Assess patient's ability to carry out ADLs; assess patient's baseline for ADL function and identify physical deficits which impact ability to perform ADLs (bathing, care of mouth/teeth, toileting, grooming, dressing, etc.)- Assess/evaluate cause of self-care deficits - Assess range of motion- Assess patient's mobility; develop plan if impaired- Assess patient's need for assistive devices and provide as appropriate- Encourage maximum independence but intervene and supervise when necessary- Involve family in performance of ADLs- Assess for home care needs following discharge - Consider OT consult to assist with ADL evaluation and planning for discharge- Provide patient education as appropriate  Outcome: Progressing  Goal: Maintains/Returns to pre admission functional level  Description: INTERVENTIONS:- Perform AM-PAC 6 Click Basic Mobility/ Daily Activity assessment daily.- Set and communicate daily mobility goal to care team and patient/family/caregiver. - Collaborate with rehabilitation services on mobility goals if consulted- Perform Range of Motion  times a day.- Reposition patient every  hours.- Dangle patient  times a day- Stand patient  times a day- Ambulate patient  times a day- Out of bed to chair  times a day - Out of bed for meals  times a day- Out of bed for toileting- Record patient progress and toleration of activity level   Outcome: Progressing     Problem: DISCHARGE PLANNING  Goal: Discharge to home or other facility with appropriate resources  Description: INTERVENTIONS:- Identify barriers to discharge w/patient and caregiver- Arrange for needed discharge resources and transportation as appropriate- Identify discharge  learning needs (meds, wound care, etc.)- Arrange for interpretive services to assist at discharge as needed- Refer to Case Management Department for coordinating discharge planning if the patient needs post-hospital services based on physician/advanced practitioner order or complex needs related to functional status, cognitive ability, or social support system  Outcome: Progressing     Problem: Alteration in Thoughts and Perception  Goal: Treatment Goal: Gain control of psychotic behaviors/thinking, reduce/eliminate presenting symptoms and demonstrate improved reality functioning upon discharge  Outcome: Progressing  Goal: Verbalize thoughts and feelings  Description: Interventions:- Promote a nonjudgmental and trusting relationship with the patient through active listening and therapeutic communication- Assess patient's level of functioning, behavior and potential for risk- Engage patient in 1 on 1 interactions- Encourage patient to express fears, feelings, frustrations, and discuss symptoms  - [place] patient to reality, help patient recognize reality-based thinking - Administer medications as ordered and assess for potential side effects- Provide the patient education related to the signs and symptoms of the illness and desired effects of prescribed medications  Outcome: Progressing  Goal: Refrain from acting on delusional thinking/internal stimuli  Description: Interventions:- Monitor patient closely, per order - Utilize least restrictive measures - Set reasonable limits, give positive feedback for acceptable - Administer medications as ordered and monitor of potential side effects  Outcome: Progressing  Goal: Agree to be compliant with medication regime, as prescribed and report medication side effects  Description: Interventions:- Offer appropriate PRN medication and supervise ingestion; conduct AIMS, as needed   Outcome: Progressing  Goal: Attend and participate in unit activities, including therapeutic,  recreational, and educational groups  Description: Interventions:-Encourage Visitation and family involvement in care  Outcome: Progressing  Goal: Recognize dysfunctional thoughts, communicate reality-based thoughts at the time of discharge  Description: Interventions:- Provide medication and psycho-education to assist patient in compliance and developing insight into his/her illness   Outcome: Progressing  Goal: Complete daily ADLs, including personal hygiene independently, as able  Description: Interventions:- Observe, teach, and assist patient with ADLS- Monitor and promote a balance of rest/activity, with adequate nutrition and elimination   Outcome: Progressing     Problem: Ineffective Coping  Goal: Cooperates with admission process  Description: Interventions: - Complete admission process  Outcome: Progressing  Goal: Identifies ineffective coping skills  Outcome: Progressing  Goal: Identifies healthy coping skills  Outcome: Progressing  Goal: Demonstrates healthy coping skills  Outcome: Progressing  Goal: Participates in unit activities  Description: Interventions:- Provide therapeutic environment - Provide required programming - Redirect inappropriate behaviors   Outcome: Progressing  Goal: Patient/Family participate in treatment and DC plans  Description: Interventions:- Provide therapeutic environment  Outcome: Progressing  Goal: Patient/Family verbalizes awareness of resources  Outcome: Progressing  Goal: Understands least restrictive measures  Description: Interventions:- Utilize least restrictive behavior  Outcome: Progressing  Goal: Free from restraint events  Description: - Utilize least restrictive measures - Provide behavioral interventions - Redirect inappropriate behaviors   Outcome: Progressing     Problem: Risk for Self Injury/Neglect  Goal: Treatment Goal: Remain safe during length of stay, learn and adopt new coping skills, and be free of self-injurious ideation, impulses and acts at the time  of discharge  Outcome: Progressing  Goal: Verbalize thoughts and feelings  Description: Interventions:- Assess and re-assess patient's lethality and potential for self-injury- Engage patient in 1:1 interactions, daily, for a minimum of 15 minutes- Encourage patient to express feelings, fears, frustrations, hopes- Establish rapport/trust with patient   Outcome: Progressing  Goal: Refrain from harming self  Description: Interventions:- Monitor patient closely, per order- Develop a trusting relationship- Supervise medication ingestion, monitor effects and side effects   Outcome: Progressing  Goal: Attend and participate in unit activities, including therapeutic, recreational, and educational groups  Description: Interventions:- Provide therapeutic and educational activities daily, encourage attendance and participation, and document same in the medical record- Obtain collateral information, encourage visitation and family involvement in care   Outcome: Progressing  Goal: Recognize maladaptive responses and adopt new coping mechanisms  Outcome: Progressing  Goal: Complete daily ADLs, including personal hygiene independently, as able  Description: Interventions:- Observe, teach, and assist patient with ADLS- Monitor and promote a balance of rest/activity, with adequate nutrition and elimination  Outcome: Progressing     Problem: Depression  Goal: Treatment Goal: Demonstrate behavioral control of depressive symptoms, verbalize feelings of improved mood/affect, and adopt new coping skills prior to discharge  Outcome: Progressing  Goal: Verbalize thoughts and feelings  Description: Interventions:- Assess and re-assess patient's level of risk - Engage patient in 1:1 interactions, daily, for a minimum of 15 minutes - Encourage patient to express feelings, fears, frustrations, hopes   Outcome: Progressing  Goal: Refrain from harming self  Description: Interventions:- Monitor patient closely, per order - Supervise medication  ingestion, monitor effects and side effects   Outcome: Progressing  Goal: Refrain from isolation  Description: Interventions:- Develop a trusting relationship - Encourage socialization   Outcome: Progressing  Goal: Refrain from self-neglect  Outcome: Progressing  Goal: Attend and participate in unit activities, including therapeutic, recreational, and educational groups  Description: Interventions:- Provide therapeutic and educational activities daily, encourage attendance and participation, and document same in the medical record   Outcome: Progressing  Goal: Complete daily ADLs, including personal hygiene independently, as able  Description: Interventions:- Observe, teach, and assist patient with ADLS-  Monitor and promote a balance of rest/activity, with adequate nutrition and elimination   Outcome: Progressing     Problem: Anxiety  Goal: Anxiety is at manageable level  Description: Interventions:- Assess and monitor patient's anxiety level. - Monitor for signs and symptoms (heart palpitations, chest pain, shortness of breath, headaches, nausea, feeling jumpy, restlessness, irritable, apprehensive). - Collaborate with interdisciplinary team and initiate plan and interventions as ordered.- [place] patient to unit/surroundings- Explain treatment plan- Encourage participation in care- Encourage verbalization of concerns/fears- Identify coping mechanisms- Assist in developing anxiety-reducing skills- Administer/offer alternative therapies- Limit or eliminate stimulants  Outcome: Progressing     Problem: Alteration in Orientation  Goal: Treatment Goal: Demonstrate a reduction of confusion and improved orientation to person, place, time and/or situation upon discharge, according to optimum baseline/ability  Outcome: Progressing  Goal: Interact with staff daily  Description: Interventions:- Assess and re-assess patient's level of orientation- Engage patient in 1 on 1 interactions, daily, for a minimum of 15 minutes -  Establish rapport/trust with patient   Outcome: Progressing  Goal: Express concerns related to confused thinking related to:  Description: Interventions:- Encourage patient to express feelings, fears, frustrations, hopes- Assign consistent caregivers - [place]/re-orient patient as needed- Allow comfort items, as appropriate- Provide visual cues, signs, etc.   Outcome: Progressing  Goal: Allow medical examinations, as recommended  Description: Interventions:- Provide physical/neurological exams and/or referrals, per provider   Outcome: Progressing  Goal: Cooperate with recommended testing/procedures  Description: Interventions:- Determine need for ancillary testing- Observe for mental status changes- Implement falls/precaution protocol   Outcome: Progressing  Goal: Attend and participate in unit activities, including therapeutic, recreational, and educational groups  Description: Interventions:- Provide therapeutic and educational activities daily, encourage attendance and participation, and document same in the medical record - Provide appropriate opportunities for reminiscence - Provide a consistent daily routine - Encourage family contact/visitation   Outcome: Progressing  Goal: Complete daily ADLs, including personal hygiene independently, as able  Description: Interventions:- Observe, teach, and assist patient with ADLS  Outcome: Progressing

## 2025-03-24 NOTE — NURSING NOTE
Pt is 80 yo female 201 admitted for severe anxiety that she states begins in the afternoon and interferes with her ability to sleep.  Denied SI.  Pt pleasant and cooperative with initial admission process.  Denied pain.  Gait steady.  VSS.  Pt with h/o diabetes, dehydration, decreased kidney function, HTN, hypothyroidism, high cholesterol, VSS.  Monitored for safety and support.

## 2025-03-24 NOTE — ED NOTES
CW was able to meet with pt. PT was agreeable to going IP at this time. 201 signed and sent to intake

## 2025-03-24 NOTE — ED NOTES
Patient is accepted at [facility]  Patient is accepted by Dr Werner attending. Matheus Wilson to place orders  per Jadyn      Transportation is arranged with [facility].     Transportation is scheduled for 115pm    Patient may go to the floor at 115pm        Nurse report is to be called to  prior to patient transfer.

## 2025-03-24 NOTE — ED CARE HANDOFF
Emergency Department Sign Out Note        Sign out and transfer of care from Dr. Taylor. See Separate Emergency Department note.     The patient, Yajaira Marsh, was evaluated by the previous provider for anxiety and depression.    Workup Completed:  labs    ED Course / Workup Pending (followup):  Crisis response, repeat labs      Repeat labs similar to prior.  I signed this patient out to Dr. Olivares at shift change pending crisis response and placement.                               Procedures  Medical Decision Making  Amount and/or Complexity of Data Reviewed  Labs: ordered.    Risk  OTC drugs.  Prescription drug management.  Decision regarding hospitalization.            Disposition  Final diagnoses:   Anxiety   Depression     Time reflects when diagnosis was documented in both MDM as applicable and the Disposition within this note       Time User Action Codes Description Comment    3/23/2025 10:57 PM Saad Babin [F41.9] Anxiety     3/23/2025 10:57 PM Saad Babin Add [F32.A] Depression           ED Disposition       ED Disposition   Transfer to Behavioral Health Condition   --    Date/Time   Sun Mar 23, 2025 10:57 PM    Comment   Yajaira Marsh should be transferred out to [facility] and has been medically cleared.               Follow-up Information    None       Patient's Medications   Discharge Prescriptions    No medications on file     No discharge procedures on file.       ED Provider  Electronically Signed by     Emily Ag MD  03/24/25 0803

## 2025-03-24 NOTE — ASSESSMENT & PLAN NOTE
Lab Results   Component Value Date    HGBA1C 11.1 (H) 03/07/2025       Recent Labs     03/23/25  1832 03/24/25  0537 03/24/25  0832 03/24/25  1300   POCGLU 178* 205* 189* 216*       Blood Sugar Average: Last 72 hrs:    Goal A1c > 7.5 to prevent hypoglycemic event in the older adult with cognitive decline  Was recently seen by geriatrics and had her insulin regimen changed to Toujeo 5 units at bedtime with aspart 2 units 3 times daily with meals  Will start Lantus 7 units at bedtime and monitor Accu-Cheks AC plus at bedtime with lispro insulin sliding scale coverage  Will monitor blood sugars and titrate insulin accordingly

## 2025-03-24 NOTE — CONSULTS
Consultation - Hospitalist   Name: Yajaira Marsh 79 y.o. female I MRN: [id number]  Unit/Bed#: [facility] 648-02 I Date of Admission: 3/24/2025   Date of Service: 3/24/2025 I Hospital Day: 0   Inpatient consult for Medical Clearance for  patient  Consult performed by: ALAN Perez  Consult ordered by: ALAN Perez        Physician Requesting Evaluation: Fede Werner MD   Reason for Evaluation / Principal Problem: Medical clearance to [facility]      Assessment & Plan  Medical clearance for psychiatric admission  Admission labs: CBC, CMP, lipid panel, TSH reviewed   Folate, B12, Vitamin D 25 hydroxy labs pending  Vitals stable   UA unremarkable   UDS not collected   EKG reveals NSR, 62 bpm   Patient is medically cleared for admission to [facility] and treatment of underlying psychiatric illness based on available results  Please contact SLIM with any questions or concerns  Type 2 diabetes mellitus with hyperglycemia, with long-term current use of insulin ([facility])  Lab Results   Component Value Date    HGBA1C 11.1 (H) 03/07/2025       Recent Labs     03/23/25  1832 03/24/25  0537 03/24/25  0832 03/24/25  1300   POCGLU 178* 205* 189* 216*       Blood Sugar Average: Last 72 hrs:    Goal A1c > 7.5 to prevent hypoglycemic event in the older adult with cognitive decline  Was recently seen by geriatrics and had her insulin regimen changed to Toujeo 5 units at bedtime with aspart 2 units 3 times daily with meals  Will start Lantus 7 units at bedtime and monitor Accu-Cheks AC plus at bedtime with lispro insulin sliding scale coverage  Will monitor blood sugars and titrate insulin accordingly  KATIE (acute kidney injury) ([facility])  Cr 1.38 -> 2.22 with a baseline near 1.5-1.7  Likely prerenal from decreased oral intake and hypotension  Will give Isolyte 1 L bolus now  Encourage water intake  Hold losartan and torsemide  Lower dose of Cardizem and Imdur and change holding parameters  Stage 3b chronic kidney  "disease (HCC)  Lab Results   Component Value Date    EGFR 36 03/24/2025    EGFR 39 03/23/2025    EGFR 31 03/22/2025    CREATININE 1.38 (H) 03/24/2025    CREATININE 1.31 (H) 03/23/2025    CREATININE 1.57 (H) 03/22/2025   Baseline creatinine 1.5-1.7  Limit nephrotoxic agents and hypotension  Essential hypertension  Home medications include, diltiazem  mg daily, losartan 25 mg daily, and isosorbide 90 mg daily, and torsemide 10 mg daily as needed for leg swelling  Due to soft BP, will decrease diltiazem to 120 mg daily and isosorbide to 30 mg daily  Will hold losartan and torsemide  Hypokalemia  K+ 3.3 -> 3.5  Replete with K-Dur 40 mEq and mag oxide and encourage oral intake  CAD (coronary artery disease)  Continue aspirin and atorvastatin  Hyperlipidemia  Continue statin  Hypothyroidism  TSH within normal limits  Continue levothyroxine 75 mcg daily  Severe episode of recurrent major depressive disorder, without psychotic features (HCC)  Admitted to [facility]  Management per primary service     Please contact the SecureChat role,\" \", with any questions/concerns.   psychiatry medical provider     Collaboration of Care: Were Recommendations Directly Discussed with Primary Treatment Team? - Yes     History of Present Illness   Yajaira Marsh is a 79 y.o. female who is originally admitted to the psychiatry service due to anxiety and depression. We are consulted for medical clearance for admission to Behavioral Health Unit and treatment of underlying psychiatric illness.  Per chart review, patient met with crisis.  She noted some stressors at home that were worsening her anxiety and depression.  She decided to sign a 201 and was amenable to inpatient behavioral health for further evaluation and treatment.    Review of Systems   Constitutional:  Negative for chills and fever.   HENT:  Negative for ear pain and sore throat.    Eyes:  Negative for pain and visual disturbance.   Respiratory:  Negative for cough and " shortness of breath.    Cardiovascular:  Negative for chest pain and palpitations.   Gastrointestinal:  Negative for abdominal pain and vomiting.   Genitourinary:  Negative for dysuria and hematuria.   Musculoskeletal:  Negative for arthralgias and back pain.   Skin:  Negative for color change and rash.   Neurological:  Negative for seizures and syncope.   All other systems reviewed and are negative.    Historical Information   Past Medical History:   Diagnosis Date    Acute embolism and thrombosis of unspecified deep veins of unspecified lower extremity (HCC)     Last Assessed:  5/18/17    Anemia     Anosmia     Anxiety     Arthritis     Asthma     Back pain     Bilateral macular retinal edema     CAD (coronary artery disease)     Cataract     Cervical disc herniation     Cervical radiculopathy     Cervical spinal stenosis     Cervical spondylolysis     Chronic kidney disease     Chronic mastoiditis     Colon polyp     Complex endometrial hyperplasia     Depression     Diabetes mellitus (HCC)     Disease of thyroid gland     DVT (deep venous thrombosis) ([facility])     DVT (deep venous thrombosis) ([facility]) 06/16/2017    Fibromyalgia     Fibromyalgia, primary     Hyperlipidemia     Hypertension     Hypothyroid     Kidney stone     Lumbar radiculopathy     Neck pain     Obese     PONV (postoperative nausea and vomiting)     Shingles 07/01/2021    Spinal stenosis     Stomach ulcer     Thyroid disease     Toxic metabolic encephalopathy 02/11/2025    Vascular claudication ([facility]) 12/11/2017     Past Surgical History:   Procedure Laterality Date    BACK SURGERY      CARPAL TUNNEL RELEASE      CATARACT EXTRACTION      CHOLECYSTECTOMY      COLONOSCOPY      CORONARY ANGIOPLASTY      CORONARY ARTERY BYPASS GRAFT      CYSTOSCOPY N/A 6/20/2017    Procedure: CYSTOSCOPY;  Surgeon: Tacho Arnold MD;  Location: [facility];  Service: Gynecology Oncology    CYSTOSCOPY      TN LAPS TOTAL HYSTERECT 250 GM/< W/RMVL TUBE/OVARY N/A 6/20/2017     Procedure: ROBOTIC HYSTERECTOMY; BILATERAL SALPINO-OOPHERECTOMY; umbilical hernia repair.;  Surgeon: Tacho Arnold MD;  Location: [facility];  Service: Gynecology Oncology    KS REPAIR FIRST ABDOMINAL WALL HERNIA N/A 2022    Procedure: REPAIR HERNIA INCISIONAL;  Surgeon: Horacio Scherer DO;  Location: [facility];  Service: General    TONSILECTOMY AND ADNOIDECTOMY      TONSILLECTOMY      UMBILICAL HERNIA REPAIR       Social History     Tobacco Use    Smoking status: Former     Current packs/day: 0.00     Average packs/day: 1 pack/day for 6.0 years (6.0 ttl pk-yrs)     Types: Cigarettes     Start date:      Quit date:      Years since quittin.2     Passive exposure: Never    Smokeless tobacco: Never    Tobacco comments:     Smoked about a half a pack for about 4 yrs.  Quite about 50 yrs ago.   Vaping Use    Vaping status: Never Used   Substance and Sexual Activity    Alcohol use: Never    Drug use: No    Sexual activity: Not Currently     Comment: No known STD risk factors     E-Cigarette/Vaping    E-Cigarette Use Never User      E-Cigarette/Vaping Substances    Nicotine No     THC No     CBD No     Flavoring No     Other No     Unknown No      Family history non-contributory  Marital Status: /Civil Union    Meds/Allergies   I have reviewed home medications using recent Epic encounter.  Prior to Admission medications    Medication Sig Start Date End Date Taking? Authorizing Provider   ALPRAZolam (XANAX) 0.5 mg tablet TAKE 1 TABLET (0.5 MG TOTAL) BY MOUTH 2 (TWO) TIMES A DAY AS NEEDED FOR ANXIETY. 3/20/25   Sukumar Clemens DO   aspirin 81 mg chewable tablet Chew 81 mg daily    Historical Provider, MD   cholecalciferol (VITAMIN D3) 1,000 units tablet Take 2,000 Units by mouth daily    Historical Provider, MD   Coenzyme Q10 (Co Q-10) 200 MG CAPS Take by mouth in the morning    Historical Provider, MD   Continuous Glucose Sensor (Dexcom G7 Sensor) Use 1 Device every 10 days 3/18/25    Ranjith Pope MD   diltiazem (TIAZAC) 300 MG 24 hr capsule Take 1 capsule (300 mg total) by mouth daily Tiddylt 8/19/24   Sukumar Clemens DO   insulin aspart (NovoLOG FlexPen) 100 UNIT/ML injection pen Use 15u tidac plus 2u/50 above 150mg/dL; max dose 70u/day 3/18/25   Ranjith Pope MD   isosorbide mononitrate (IMDUR) 30 mg 24 hr tablet Take 90 mg by mouth daily    Historical Provider, MD   levothyroxine 75 mcg tablet Take 1 tablet (75 mcg total) by mouth daily 3/18/25   Ranjith Pope MD   losartan (COZAAR) 100 MG tablet Take 25 mg by mouth daily    Historical Provider, MD   pantoprazole (PROTONIX) 40 mg tablet Take 1 tablet (40 mg total) by mouth daily 3/22/25 4/21/25  Diane Zelaya MD   rosuvastatin (CRESTOR) 20 MG tablet Take 1 tablet (20 mg total) by mouth every evening 8/19/24   Sukumar Clemens DO   torsemide (DEMADEX) 20 mg tablet TAKE 0.5 TABLETS (10 MG TOTAL) BY MOUTH AS NEEDED (TAKE UP TO 2 TIMES PER WEEK FOR LEG SWELLING AS NEEDED) 5/31/24   ALAN Serrano 300 units/mL CONCENTRATED U-300 injection pen (1-unit dial) Inject 50 Units under the skin in the morning 3/18/25   Ranjith Pope MD   venlafaxine (EFFEXOR-XR) 37.5 mg 24 hr capsule Take 37.5 mg by mouth daily    Historical Provider, MD     Allergies   Allergen Reactions    Bee Pollen Other (See Comments)    Cat Dander Other (See Comments)    Dog Epithelium (Canis Lupus Familiaris) Other (See Comments)    Molds & Smuts Allergic Rhinitis    Other Allergic Rhinitis     RAGWEED, CAT DANDER, DOG DANDER     Short Ragweed Pollen Ext Other (See Comments)    Pollen Extract Other (See Comments)     Cold symptoms         Objective :  Temp:  [98.3 °F (36.8 °C)] 98.3 °F (36.8 °C)  HR:  [65-85] 80  BP: (127-198)/(57-83) 134/79  Resp:  [16-20] 18  SpO2:  [94 %-99 %] 97 %  O2 Device: None (Room air)       Physical Exam  HENT:      Head: Normocephalic.      Mouth/Throat:      Mouth: Mucous  membranes are moist.   Cardiovascular:      Rate and Rhythm: Normal rate.   Pulmonary:      Effort: Pulmonary effort is normal. No respiratory distress.      Breath sounds: Normal breath sounds.   Abdominal:      General: Abdomen is flat. Bowel sounds are normal. There is no distension.      Palpations: Abdomen is soft.      Tenderness: There is no abdominal tenderness. There is no guarding or rebound.   Musculoskeletal:         General: No tenderness. Normal range of motion.      Cervical back: Normal range of motion.      Right lower leg: No edema.      Left lower leg: No edema.   Skin:     General: Skin is warm and dry.      Capillary Refill: Capillary refill takes less than 2 seconds.      Findings: No erythema.   Neurological:      Mental Status: She is alert and oriented to person, place, and time.   Psychiatric:         Behavior: Behavior normal.           Lab Results: I have reviewed the following results:  Results from last 7 days   Lab Units 03/23/25  1815   WBC Thousand/uL 9.17   HEMOGLOBIN g/dL 12.0   HEMATOCRIT % 36.2   PLATELETS Thousands/uL 245   SEGS PCT % 67   LYMPHO PCT % 25   MONO PCT % 7   EOS PCT % 1     Results from last 7 days   Lab Units 03/24/25  0554 03/23/25  1815   SODIUM mmol/L 140 138   POTASSIUM mmol/L 3.3* 3.9   CHLORIDE mmol/L 114* 111*   CO2 mmol/L 18* 18*   BUN mg/dL 14 16   CREATININE mg/dL 1.38* 1.31*   ANION GAP mmol/L 8 9   CALCIUM mg/dL 9.4 9.3   ALBUMIN g/dL  --  4.0   TOTAL BILIRUBIN mg/dL  --  0.74   ALK PHOS U/L  --  98   ALT U/L  --  18   AST U/L  --  18   GLUCOSE RANDOM mg/dL 217* 189*             Lab Results   Component Value Date/Time    HGBA1C 11.1 (H) 03/07/2025 02:07 AM    HGBA1C 12.4 (A) 02/10/2025 09:32 AM    HGBA1C 10.8 (H) 08/21/2024 11:45 AM    HGBA1C 11.9 (H) 04/22/2024 11:09 AM    HGBA1C 11.8 (H) 11/06/2023 09:34 AM     Results from last 7 days   Lab Units 03/24/25  1300 03/24/25  0832 03/24/25  0537 03/23/25  1832 03/22/25  1633 03/22/25  1527 03/22/25  1431    POC GLUCOSE mg/dl 216* 189* 205* 178* 263* 289* 329*       Imaging Results Review: I reviewed radiology reports from this admission including: chest xray.  Other Study Results Review: EKG was personally reviewed and my interpretation is: NSR. HR 62..    Administrative Statements   I have spent a total time of  minutes in caring for this patient on the day of the visit/encounter including Diagnostic results, Instructions for management, Patient and family education, Risk factor reductions, Impressions, Counseling / Coordination of care, Documenting in the medical record, Reviewing/placing orders in the medical record (including tests, medications, and/or procedures), Obtaining or reviewing history  , and Communicating with other healthcare professionals .  ** Please Note: This note has been constructed using a voice recognition system. **

## 2025-03-24 NOTE — ASSESSMENT & PLAN NOTE
Home medications include, diltiazem  mg daily, losartan 25 mg daily, and isosorbide 90 mg daily, and torsemide 10 mg daily as needed for leg swelling  Due to soft BP, will decrease diltiazem to 120 mg daily and isosorbide to 30 mg daily  Will hold losartan and torsemide

## 2025-03-25 ENCOUNTER — APPOINTMENT (INPATIENT)
Dept: CT IMAGING | Facility: HOSPITAL | Age: 80
End: 2025-03-25
Payer: COMMERCIAL

## 2025-03-25 PROBLEM — G31.84 MCI (MILD COGNITIVE IMPAIRMENT): Status: ACTIVE | Noted: 2025-03-25

## 2025-03-25 LAB
25(OH)D3 SERPL-MCNC: 36.5 NG/ML (ref 30–100)
ANION GAP SERPL CALCULATED.3IONS-SCNC: 10 MMOL/L (ref 4–13)
BUN SERPL-MCNC: 20 MG/DL (ref 5–25)
CALCIUM SERPL-MCNC: 9 MG/DL (ref 8.4–10.2)
CHLORIDE SERPL-SCNC: 113 MMOL/L (ref 96–108)
CHOLEST SERPL-MCNC: 107 MG/DL (ref ?–200)
CO2 SERPL-SCNC: 19 MMOL/L (ref 21–32)
CREAT SERPL-MCNC: 2.22 MG/DL (ref 0.6–1.3)
FOLATE SERPL-MCNC: 10.9 NG/ML
GFR SERPL CREATININE-BSD FRML MDRD: 20 ML/MIN/1.73SQ M
GLUCOSE P FAST SERPL-MCNC: 169 MG/DL (ref 65–99)
GLUCOSE SERPL-MCNC: 166 MG/DL (ref 65–140)
GLUCOSE SERPL-MCNC: 169 MG/DL (ref 65–140)
GLUCOSE SERPL-MCNC: 178 MG/DL (ref 65–140)
GLUCOSE SERPL-MCNC: 218 MG/DL (ref 65–140)
HDLC SERPL-MCNC: 32 MG/DL
LDLC SERPL CALC-MCNC: 46 MG/DL (ref 0–100)
MAGNESIUM SERPL-MCNC: 1.3 MG/DL (ref 1.9–2.7)
NONHDLC SERPL-MCNC: 75 MG/DL
PHOSPHATE SERPL-MCNC: 4.2 MG/DL (ref 2.3–4.1)
POTASSIUM SERPL-SCNC: 3.5 MMOL/L (ref 3.5–5.3)
SODIUM SERPL-SCNC: 142 MMOL/L (ref 135–147)
TRIGL SERPL-MCNC: 143 MG/DL (ref ?–150)
VIT B12 SERPL-MCNC: 144 PG/ML (ref 180–914)

## 2025-03-25 PROCEDURE — 83735 ASSAY OF MAGNESIUM: CPT | Performed by: NURSE PRACTITIONER

## 2025-03-25 PROCEDURE — 82948 REAGENT STRIP/BLOOD GLUCOSE: CPT

## 2025-03-25 PROCEDURE — 82607 VITAMIN B-12: CPT | Performed by: NURSE PRACTITIONER

## 2025-03-25 PROCEDURE — 70450 CT HEAD/BRAIN W/O DYE: CPT

## 2025-03-25 PROCEDURE — 84100 ASSAY OF PHOSPHORUS: CPT | Performed by: NURSE PRACTITIONER

## 2025-03-25 PROCEDURE — 80048 BASIC METABOLIC PNL TOTAL CA: CPT | Performed by: NURSE PRACTITIONER

## 2025-03-25 PROCEDURE — 80061 LIPID PANEL: CPT | Performed by: NURSE PRACTITIONER

## 2025-03-25 PROCEDURE — 82746 ASSAY OF FOLIC ACID SERUM: CPT | Performed by: NURSE PRACTITIONER

## 2025-03-25 PROCEDURE — 99222 1ST HOSP IP/OBS MODERATE 55: CPT | Performed by: NURSE PRACTITIONER

## 2025-03-25 PROCEDURE — 99223 1ST HOSP IP/OBS HIGH 75: CPT | Performed by: STUDENT IN AN ORGANIZED HEALTH CARE EDUCATION/TRAINING PROGRAM

## 2025-03-25 PROCEDURE — 82306 VITAMIN D 25 HYDROXY: CPT | Performed by: NURSE PRACTITIONER

## 2025-03-25 RX ORDER — ISOSORBIDE MONONITRATE 30 MG/1
30 TABLET, EXTENDED RELEASE ORAL DAILY
Status: DISCONTINUED | OUTPATIENT
Start: 2025-03-25 | End: 2025-03-25

## 2025-03-25 RX ORDER — TRAZODONE HYDROCHLORIDE 50 MG/1
50 TABLET ORAL
Status: DISCONTINUED | OUTPATIENT
Start: 2025-03-25 | End: 2025-03-28 | Stop reason: HOSPADM

## 2025-03-25 RX ORDER — VENLAFAXINE HYDROCHLORIDE 75 MG/1
75 CAPSULE, EXTENDED RELEASE ORAL DAILY
Status: DISCONTINUED | OUTPATIENT
Start: 2025-03-26 | End: 2025-03-28 | Stop reason: HOSPADM

## 2025-03-25 RX ORDER — LANOLIN ALCOHOL/MO/W.PET/CERES
400 CREAM (GRAM) TOPICAL 2 TIMES DAILY
Status: DISCONTINUED | OUTPATIENT
Start: 2025-03-25 | End: 2025-03-26

## 2025-03-25 RX ORDER — SODIUM CHLORIDE, SODIUM GLUCONATE, SODIUM ACETATE, POTASSIUM CHLORIDE, MAGNESIUM CHLORIDE, SODIUM PHOSPHATE, DIBASIC, AND POTASSIUM PHOSPHATE .53; .5; .37; .037; .03; .012; .00082 G/100ML; G/100ML; G/100ML; G/100ML; G/100ML; G/100ML; G/100ML
1000 INJECTION, SOLUTION INTRAVENOUS ONCE
Status: COMPLETED | OUTPATIENT
Start: 2025-03-25 | End: 2025-03-25

## 2025-03-25 RX ORDER — INSULIN GLARGINE 100 [IU]/ML
7 INJECTION, SOLUTION SUBCUTANEOUS
Status: DISCONTINUED | OUTPATIENT
Start: 2025-03-25 | End: 2025-03-28 | Stop reason: HOSPADM

## 2025-03-25 RX ORDER — MIRTAZAPINE 7.5 MG/1
7.5 TABLET, FILM COATED ORAL
Status: DISCONTINUED | OUTPATIENT
Start: 2025-03-25 | End: 2025-03-28 | Stop reason: HOSPADM

## 2025-03-25 RX ORDER — ISOSORBIDE MONONITRATE 30 MG/1
30 TABLET, EXTENDED RELEASE ORAL DAILY
Status: DISCONTINUED | OUTPATIENT
Start: 2025-03-26 | End: 2025-03-28 | Stop reason: HOSPADM

## 2025-03-25 RX ORDER — DILTIAZEM HYDROCHLORIDE 120 MG/1
120 CAPSULE, COATED, EXTENDED RELEASE ORAL DAILY
Status: DISCONTINUED | OUTPATIENT
Start: 2025-03-26 | End: 2025-03-28 | Stop reason: HOSPADM

## 2025-03-25 RX ADMIN — Medication 400 MG: at 10:48

## 2025-03-25 RX ADMIN — PRAVASTATIN SODIUM 40 MG: 40 TABLET ORAL at 17:04

## 2025-03-25 RX ADMIN — PANTOPRAZOLE SODIUM 40 MG: 40 TABLET, DELAYED RELEASE ORAL at 08:53

## 2025-03-25 RX ADMIN — TRAZODONE HYDROCHLORIDE 50 MG: 50 TABLET ORAL at 21:23

## 2025-03-25 RX ADMIN — INSULIN LISPRO 1 UNITS: 100 INJECTION, SOLUTION INTRAVENOUS; SUBCUTANEOUS at 08:51

## 2025-03-25 RX ADMIN — LEVOTHYROXINE SODIUM 75 MCG: 0.07 TABLET ORAL at 08:54

## 2025-03-25 RX ADMIN — Medication 2000 UNITS: at 08:53

## 2025-03-25 RX ADMIN — INSULIN LISPRO 2 UNITS: 100 INJECTION, SOLUTION INTRAVENOUS; SUBCUTANEOUS at 12:33

## 2025-03-25 RX ADMIN — ASPIRIN 81 MG CHEWABLE TABLET 81 MG: 81 TABLET CHEWABLE at 08:53

## 2025-03-25 RX ADMIN — VENLAFAXINE HYDROCHLORIDE 37.5 MG: 37.5 CAPSULE, EXTENDED RELEASE ORAL at 08:53

## 2025-03-25 RX ADMIN — ISOSORBIDE MONONITRATE 30 MG: 30 TABLET, EXTENDED RELEASE ORAL at 08:53

## 2025-03-25 RX ADMIN — DILTIAZEM HYDROCHLORIDE 120 MG: 120 CAPSULE, COATED, EXTENDED RELEASE ORAL at 08:53

## 2025-03-25 RX ADMIN — SODIUM CHLORIDE, SODIUM GLUCONATE, SODIUM ACETATE, POTASSIUM CHLORIDE, MAGNESIUM CHLORIDE, SODIUM PHOSPHATE, DIBASIC, AND POTASSIUM PHOSPHATE 1000 ML: .53; .5; .37; .037; .03; .012; .00082 INJECTION, SOLUTION INTRAVENOUS at 11:27

## 2025-03-25 RX ADMIN — TORSEMIDE 10 MG: 5 TABLET ORAL at 08:53

## 2025-03-25 RX ADMIN — Medication 3 MG: at 21:23

## 2025-03-25 RX ADMIN — INSULIN GLARGINE 7 UNITS: 100 INJECTION, SOLUTION SUBCUTANEOUS at 21:24

## 2025-03-25 RX ADMIN — INSULIN LISPRO 1 UNITS: 100 INJECTION, SOLUTION INTRAVENOUS; SUBCUTANEOUS at 17:02

## 2025-03-25 RX ADMIN — INSULIN LISPRO 1 UNITS: 100 INJECTION, SOLUTION INTRAVENOUS; SUBCUTANEOUS at 21:24

## 2025-03-25 RX ADMIN — Medication 400 MG: at 17:04

## 2025-03-25 NOTE — ASSESSMENT & PLAN NOTE
Cr 1.38 -> 2.22 with a baseline near 1.5-1.7  Likely prerenal from decreased oral intake and hypotension  Will give Isolyte 1 L bolus now  Encourage water intake  Hold losartan and torsemide  Lower dose of Cardizem and Imdur and change holding parameters

## 2025-03-25 NOTE — NURSING NOTE
Pt was visible in the milieu with no peer interaction. Endorses less anxiety, denies all other psych s/s. No behaviors noted. No c/o pain. Compliant with HS medications. Head to toe assessment done. She has bruise on her right forearm. She has redness on suprapubic area and groin area. Safety maintained. Will continue to monitor.

## 2025-03-25 NOTE — ASSESSMENT & PLAN NOTE
- h/o MDD and MADAN w/ panic attacks, previously in outpatient treatment by Dr. Miller (2020 until 2022), currently on Effexor 37.5 mg daily (recently started by PCP) and Xanax 0.5 mg BID presented with acute exacerbation of depression and anxiety in the context of social isolation and psychosocial stressors over past two years since moved to the [facility].  - Increase Effexor to 75 mg po daily; doses to be adjusted slowly based on CrCl.  - Xanax to be tapered off as tolerated due to concerns for MCI and fall risk  - Melatonin 3 mg nightly  - Trazodone 50 mg nightly for insomnia  - Group and milieu therapy

## 2025-03-25 NOTE — H&P
Psychiatric Evaluation - Behavioral Health   Name: Yajaira Marsh 79 y.o. female I MRN: [id number]  Unit/Bed#: [facility] 648-02 I Date of Admission: 3/24/2025   Date of Service: 3/25/2025 I Hospital Day: 1     Assessment & Plan  Severe episode of recurrent major depressive disorder, without psychotic features (HCC)  - h/o MDD and MADAN w/ panic attacks, previously in outpatient treatment by Dr. Miller (2020 until 2022), currently on Effexor 37.5 mg daily (recently started by PCP) and Xanax 0.5 mg BID presented with acute exacerbation of depression and anxiety in the context of social isolation and psychosocial stressors over past two years since moved to the [facility].  - Increase Effexor to 75 mg po daily; doses to be adjusted slowly based on CrCl.  - Xanax to be tapered off as tolerated due to concerns for MCI and fall risk  - Melatonin 3 mg nightly  - Trazodone 50 mg nightly for insomnia  - Group and milieu therapy  MADAN (generalized anxiety disorder)  - Management as per principal problem    MCI (mild cognitive impairment)  - SLUMS: 24/30 on 3/25/25  - Fall and delirium precaution  - PT eval  CAD (coronary artery disease)  - f/u SLIM recs  Hyperlipidemia  - f/u SLIM recs  Hypothyroidism  - f/u SLIM recs  SARAY (obstructive sleep apnea)  - outpatient f/u w/ sleep medicine recommended  Type 2 diabetes mellitus with hyperglycemia, with long-term current use of insulin ([facility])  Lab Results   Component Value Date    HGBA1C 11.1 (H) 03/07/2025       Recent Labs     03/24/25  0832 03/24/25  1300 03/24/25  1659 03/24/25  2221   POCGLU 189* 216* 230* 182*   - f/u SLIM recs    Blood Sugar Average: Last 72 hrs:  (P) 206    KATIE (acute kidney injury) (HCC)  - f/u SLIM recs  Stage 3b chronic kidney disease (HCC)  Lab Results   Component Value Date    EGFR 20 03/25/2025    EGFR 36 03/24/2025    EGFR 39 03/23/2025    CREATININE 2.22 (H) 03/25/2025    CREATININE 1.38 (H) 03/24/2025    CREATININE 1.31 (H) 03/23/2025   - f/u SLIM  recs  Essential hypertension  - f/u SLIM recs  Hypokalemia  - f/u SLIM recs  Fall       Plan    Risks, benefits and possible side effects of Medications:   Risks, benefits, and possible side effects of medications explained to patient and patient verbalizes understanding and agreement for treatment.     - Encourage early mobility and having a structured day  - Provide frequent re-orientation, and cognitive stimulation  - Ensure assistive devices are in proper working order (eye-glasses, hearing aids)  - Encourage adequate hydration, nutrition and monitor bowel movements  - Maintain sleep-wake cycle: Uninterrupted sleep time; low-level lighting at night  - Fall precaution  - f/u SLIM recs regarding the medical problems   - f/u labs  - Continue medication titration and treatment plan; adjust medication to optimize treatment response and as clinically indicated.     Scheduled medications:  Current Facility-Administered Medications   Medication Dose Route Frequency Provider Last Rate    acetaminophen  650 mg Oral Q4H PRN ALAN Perez      acetaminophen  650 mg Oral Q4H PRN ALAN Perez      acetaminophen  975 mg Oral Q6H PRN ALAN Perez      aluminum-magnesium hydroxide-simethicone  30 mL Oral Q4H PRN ALAN Perez      aspirin  81 mg Oral Daily ALAN Perez      benztropine  1 mg Intramuscular Q4H PRN Max 6/day ALAN Perez      benztropine  0.5 mg Oral Q4H PRN Max 6/day ALAN Perez      cholecalciferol  2,000 Units Oral Daily ALAN Perez      [START ON 3/26/2025] diltiazem  120 mg Oral Daily ALAN Perez      hydrOXYzine HCL  25 mg Oral Q6H PRN Max 4/day ALAN Perez      hydrOXYzine HCL  50 mg Oral Q6H PRN Max 4/day ALAN Perez      insulin glargine  7 Units Subcutaneous HS ALAN Perez      insulin lispro  1-6 Units  Subcutaneous TID AC Juanita Nash, CRNP      insulin lispro  1-6 Units Subcutaneous HS Juanita Nash, CRVENANCIO      [START ON 3/26/2025] isosorbide mononitrate  30 mg Oral Daily Juanita Nash, CRNP      levothyroxine  75 mcg Oral Daily Juanita Nash, CRNP      LORazepam  1 mg Intramuscular Q6H PRN Max 3/day Juanita Nash, CRNP      LORazepam  1 mg Oral Q6H PRN Max 3/day Juanita Nash, CRNP      magnesium Oxide  400 mg Oral BID Juanita Nash, CRNP      melatonin  3 mg Oral HS Fede Werner MD      mirtazapine  7.5 mg Oral HS PRN Fede Werner MD      multi-electrolyte  1,000 mL Intravenous Once Juanita Nash, CRNP      OLANZapine  5 mg Intramuscular Q3H PRN Max 3/day Juanita Nash, CRNP      OLANZapine  2.5 mg Oral Q4H PRN Max 6/day Juanita Nash, CRNP      OLANZapine  5 mg Oral Q4H PRN Max 3/day Juanita Nash, CRNP      OLANZapine  5 mg Oral Q3H PRN Max 3/day Juanita Nash, CRNP      pantoprazole  40 mg Oral Daily Juanita Nash, CRNP      polyethylene glycol  17 g Oral Daily PRN Juanita Nash, CRNP      pravastatin  40 mg Oral Daily With Dinner Juanita Nash, CRNP      propranolol  5 mg Oral Q8H PRN Juanita Floydtalha, CRNP      traZODone  50 mg Oral HS Fede Werner MD      [START ON 3/26/2025] venlafaxine  75 mg Oral Daily Fede Werner MD          PRN:    acetaminophen    acetaminophen    acetaminophen    aluminum-magnesium hydroxide-simethicone    benztropine    benztropine    hydrOXYzine HCL    hydrOXYzine HCL    LORazepam    LORazepam    mirtazapine    OLANZapine    OLANZapine    OLANZapine    OLANZapine    polyethylene glycol    propranolol    Diet:       Diet Orders   (From admission, onward)                 Start     Ordered    03/25/25 0935  Diet Tee/CHO Controlled; Consistent Carbohydrate Diet Level 2 (5 carb servings/75 grams CHO/meal)  Diet effective now       "  References:    Adult Nutrition Support Algorithm    RD Therapeutic Diet Order Protocol   Question Answer Comment   Diet Type Tee/CHO Controlled    Tee/CHO Controlled Consistent Carbohydrate Diet Level 2 (5 carb servings/75 grams CHO/meal)    RD to adjust per protocol Yes        03/25/25 0934    03/25/25 0935  Dietary nutrition supplements  Once        Question Answer Comment   Select Supplement: Glucerna-Chocolate    Select Supplement: Glucerna-Strawberry    Select Supplement: Glucerna-Vanilla    Frequency Breakfast, Lunch, Dinner        03/25/25 0934                     - Observation: routine            - VS: as per unit protocol  - Legal status: 201  - Psychoeducation (benefits and potential risks) discussed, importance of compliance with the psychiatric treatment reiterated, and the patient verbalized understanding of the matter  - Encourage group attendance and milieu therapy   - The pt was educated and agreed to verbalize any suicidal thoughts, frustrations or concerns to the nursing staff, immediately.   - Dispo: To be determined          Chief Complaint: \"I'm very isolated\"    History of Present Illness     Yajaira Marsh is a 79 y.o.  female, , domiciled w/  in a senior living community,  on SSI (worked as a ) , w/ PMH of multiple medical conditions including CAD, HLD, DM, hypothyroidism, CKD, SARAY, h/o DVT and fall and PPH of MDD and MADAN w/ panic attacks, no prior psychiatric admissions, no prior SA, no h/o self-injurious behavior, previously in outpatient psychiatric care with Dr. Miller at [facility] ([id number]), currently on Effexor XR 37.5 mg daily and Xanax 0.5 mg BID as per PCP who presented to the ED on 3/23/25 with increased anxiety, insomnia and depression. The patient signed 201 and got admitted to the inpatient psychiatry unit 6B for further psychiatric stabilization.      The patient was visited on the unit; chart reviewed. Presented calm, cooperative and well " "related, dressed in hospital attire, w/ fair hygiene, good eye contact, depressed and anxious mood, constricted affect, talking in normal tone, volume and amount, w/ linear thought process, fair insight and judgement. She reported feeling very isolated since moved to the new house in a senior living community about 2 years ago. She noted that her  is 82 y/o, and does not talk too much with her, spending time on his computer, and she does not have close relationship with the neighbors, reportedly \"they are very old\", and feels isolated. She reported feeling depressed with decreased appetite and energy, increased anxiety waqar anticipatory anxiety of insomnia, with initial and middle insomnia. Denied hopelessness, worthlessness or feeling guilty. Strongly denied SI/HI, intent or plan upon direct inquiry at this time. Denied A/VH. No manic sxs, paranoid ideations or fixed delusions were elicited.  Denied history of eating disorder or OCD sxs.     SLUMS: 24/30 (see media). Head CT and labs ordered. Labs were concerning for elevated CR, and will f/u SLIM recs. Effexor is being uptitrated to 75 mg daily (doses to be adjusted slowly based on CrCl), and started on Trazodone 50 mg nightly. Xanax to be tapered off. Will expand collat information.    Psychiatric Review Of Systems:  Pertinent items are noted in HPI; all others negative    Historical Information     Past Psychiatric History:   Past Inpatient Psychiatric Treatment:   No history of past inpatient psychiatric admissions  Past Outpatient Psychiatric Treatment:    Was in outpatient psychiatric treatment in the past with a psychiatrist Dr. Miller at [facility] (2020 until 2022)  Past Suicide Attempts: no  Past Violent Behavior: no  Past Psychiatric Medication Trials:  Cymbalta, Effexor XR, Trazodone, Xanax    Substance Abuse History:  Denied smoking cigarettes, binge drinking alcohol or other illicit substance use.    Social History     Substance and Sexual Activity "   Alcohol Use Never     Social History     Substance and Sexual Activity   Drug Use No       Family Psychiatric History:   Family History   Problem Relation Age of Onset    Arthritis Mother     Leukemia Mother     Other Mother         Anxiety, major depressive disorder, recurrent episode with atypical features    Coronary artery disease Father         Heart problem    Diabetes Father     Other Father         Infectious disease    Alzheimer's disease Maternal Grandmother     Other Maternal Grandfather         Heart problem    Other Daughter         Anxiety, major depressive disorder, recurrent episode with atypical features    Alcohol abuse Other         Grandparent    Cancer Family     Diabetes Family     Hypertension Family     Other Family         Reported prior back trouble, thyroid disorder       Social History:  Social History     Socioeconomic History    Marital status: /Civil Union     Spouse name: Not on file    Number of children: 2    Years of education: High school or GED    Highest education level: Not on file   Occupational History    Occupation: Retired   Tobacco Use    Smoking status: Former     Current packs/day: 0.00     Average packs/day: 1 pack/day for 6.0 years (6.0 ttl pk-yrs)     Types: Cigarettes     Start date:      Quit date:      Years since quittin.2     Passive exposure: Never    Smokeless tobacco: Never    Tobacco comments:     Smoked about a half a pack for about 4 yrs.  Quite about 50 yrs ago.   Vaping Use    Vaping status: Never Used   Substance and Sexual Activity    Alcohol use: Never    Drug use: No    Sexual activity: Not Currently     Comment: No known STD risk factors   Other Topics Concern    Not on file   Social History Narrative    Lives independently with spouse    No known risk factors    Denied:  Exercising regularly     Social Drivers of Health     Financial Resource Strain: Not At Risk (3/6/2025)    Received from [facility]     "Financial Insecurity     In the last 12 months did you skip medications to save money?: No     In the last 12 months was there a time when you needed to see a doctor but could not because of cost?: No   Food Insecurity: No Food Insecurity (3/24/2025)    Nursing - Inadequate Food Risk Classification     Worried About Running Out of Food in the Last Year: Not on file     Ran Out of Food in the Last Year: Not on file     Ran Out of Food in the Last Year: Never true   Transportation Needs: No Transportation Needs (3/24/2025)    Nursing - Transportation Risk Classification     Lack of Transportation: Not on file     Lack of Transportation: No   Physical Activity: Not on file   Stress: Not on file   Social Connections: Socially Integrated (3/6/2025)    Received from [facility]    Social Connection     Do you often feel lonely?: No   Intimate Partner Violence: Unknown (3/24/2025)    Nursing IPS     Feels Physically and Emotionally Safe: Not on file     Physically Hurt by Someone: Not on file     Humiliated or Emotionally Abused by Someone: Not on file     Physically Hurt by Someone: No     Hurt or Threatened by Someone: No   Housing Stability: Unknown (3/24/2025)    Nursing: Inadequate Housing Risk Classification     Has Housing: Not on file     Worried About Losing Housing: Not on file     Unable to Get Utilities: Not on file     Unable to Pay for Housing in the Last Year: No     Has Housin       Developmental:  Education: some college  Marital history:   Children: one son and one daughter, 6 grandchildren and 2 great grandchildren  Living arrangement, social support:  and her dog (\"Snowy\")  Occupational History: retired ; on SSI  Access to firearms: denied    Traumatic History:   Abuse:none is reported  Other Traumatic Events:  none is reported    Past Medical History:   Diagnosis Date    Acute embolism and thrombosis of unspecified deep veins of unspecified lower extremity " ([facility])     Last Assessed:  5/18/17    Anemia     Anosmia     Anxiety     Arthritis     Asthma     Back pain     Bilateral macular retinal edema     CAD (coronary artery disease)     Cataract     Cervical disc herniation     Cervical radiculopathy     Cervical spinal stenosis     Cervical spondylolysis     Chronic kidney disease     Chronic mastoiditis     Colon polyp     Complex endometrial hyperplasia     Depression     Diabetes mellitus (HCC)     Disease of thyroid gland     DVT (deep venous thrombosis) ([facility])     DVT (deep venous thrombosis) ([facility]) 06/16/2017    Fibromyalgia     Fibromyalgia, primary     Hyperlipidemia     Hypertension     Hypothyroid     Kidney stone     Lumbar radiculopathy     Neck pain     Obese     PONV (postoperative nausea and vomiting)     Shingles 07/01/2021    Spinal stenosis     Stomach ulcer     Thyroid disease     Toxic metabolic encephalopathy 02/11/2025    Vascular claudication ([facility]) 12/11/2017       Medical Review Of Systems:  Pertinent items are noted in HPI; all others negative    Meds/Allergies   all current active meds have been reviewed  Allergies   Allergen Reactions    Bee Pollen Other (See Comments)    Cat Dander Other (See Comments)    Dog Epithelium (Canis Lupus Familiaris) Other (See Comments)    Molds & Smuts Allergic Rhinitis    Other Allergic Rhinitis     RAGWEED, CAT DANDER, DOG DANDER     Short Ragweed Pollen Ext Other (See Comments)    Pollen Extract Other (See Comments)     Cold symptoms         Objective      Mental Status Evaluation:  Appearance and attitude: appeared as stated age, cooperative and attentive, dressed in hospital attire, with good hygiene  Eye contact: good  Motor Function: within normal limits, No PMA/PMR  Gait/station: Not observed  Speech: normal for rate, rhythm, volume, latency, amount, over-inclusive  Language: No overt abnormality  Mood/affect: depressed, anxious / Affect was constricted but reactive, mood congruent  Thought Processes:  linear  Thought content: denied suicidal ideations or homicidal ideations, no overt delusions elicited, negative thinking, ruminations  Associations: concrete associations  Perceptual disturbances: denies Auditory/Visual/Tactile Hallucinations  Orientation: oriented to time, person, place and to the situational context  Cognitive Function: intact  Memory: recent and remote memory grossly intact - SLUMS: 24/30 (see media)  Intellect: average  Fund of knowledge: aware of current events, aware of past history, and vocabulary average  Impulse control: good  Insight/judgment: fair/fair            Lab results: I have personally reviewed all pertinent laboratory/tests results  Results from the past 24 hours:   Recent Results (from the past 24 hours)   Fingerstick Glucose (POCT)    Collection Time: 03/24/25  1:00 PM   Result Value Ref Range    POC Glucose 216 (H) 65 - 140 mg/dl   Fingerstick Glucose (POCT)    Collection Time: 03/24/25  4:59 PM   Result Value Ref Range    POC Glucose 230 (H) 65 - 140 mg/dl   Fingerstick Glucose (POCT)    Collection Time: 03/24/25 10:21 PM   Result Value Ref Range    POC Glucose 182 (H) 65 - 140 mg/dl   Basic metabolic panel    Collection Time: 03/25/25  5:36 AM   Result Value Ref Range    Sodium 142 135 - 147 mmol/L    Potassium 3.5 3.5 - 5.3 mmol/L    Chloride 113 (H) 96 - 108 mmol/L    CO2 19 (L) 21 - 32 mmol/L    ANION GAP 10 4 - 13 mmol/L    BUN 20 5 - 25 mg/dL    Creatinine 2.22 (H) 0.60 - 1.30 mg/dL    Glucose 169 (H) 65 - 140 mg/dL    Glucose, Fasting 169 (H) 65 - 99 mg/dL    Calcium 9.0 8.4 - 10.2 mg/dL    eGFR 20 ml/min/1.73sq m   Phosphorus    Collection Time: 03/25/25  5:36 AM   Result Value Ref Range    Phosphorus 4.2 (H) 2.3 - 4.1 mg/dL   Magnesium    Collection Time: 03/25/25  5:36 AM   Result Value Ref Range    Magnesium 1.3 (L) 1.9 - 2.7 mg/dL   Lipid panel    Collection Time: 03/25/25  5:36 AM   Result Value Ref Range    Cholesterol 107 See Comment mg/dL    Triglycerides 143  See Comment mg/dL    HDL, Direct 32 (L) >=50 mg/dL    LDL Calculated 46 0 - 100 mg/dL    Non-HDL-Chol (CHOL-HDL) 75 mg/dl   Fingerstick Glucose (POCT)    Collection Time: 03/25/25 11:37 AM   Result Value Ref Range    POC Glucose 218 (H) 65 - 140 mg/dl       Imaging Studies: XR chest 1 view portable  Result Date: 3/22/2025  Narrative: XR CHEST PORTABLE INDICATION: Dyspnea. COMPARISON: CXR and chest CT 2/11/2025. FINDINGS: Clear lungs. No pneumothorax or pleural effusion. Normal cardiomediastinal silhouette. CABG. Bones are unremarkable for age. Normal upper abdomen.     Impression: No acute cardiopulmonary disease. Workstation performed: [id number]     CT head wo contrast  Result Date: 3/6/2025  Narrative: Clinical information: Altered mental status. Technique: Unenhanced CT scan of the brain was performed by department technique. Images were reviewed in soft tissue and bone algorithms with 2.5 mm axial sections, sagittal and coronal reformations. Comparison: CT dated 1/5/2025. Findings Brain Parenchyma: No large acute territorial infarction. Cerebral white matter hypoattenuation is nonspecific and likely related to microangiopathy. Ventricular system, basal cisterns and extra-axial spaces: Prominent, compatible with cerebral volume loss. Intracranial hemorrhage: None. Midline shift: None. Skull base & Calvarium: No depressed calvarial fracture. Atherosclerotic vascular calcifications are noted at the skull base. Paranasal sinuses and mastoid air cells: Opacification of the left frontal sinus and left anterior ethmoid air cells. Intrinsic hyperdensity may be secondary to fungal colonization or inspissated secretions. Visualized orbits: Bilateral lens replacement. Sella: Partially empty sella.    Impression: Impression: 1.  No acute intracranial hemorrhage or large acute territorial infarction. 2.  Cerebral volume loss and sequela of chronic microvascular ischemia. Workstation:[id number]    CT renal stone study abdomen  pelvis wo contrast  Result Date: 3/6/2025  Narrative: History: Vomiting abdominal pain Exam: Nonenhanced CT of the abdomen and pelvis.   Technique: Using helical technique, axial images were obtained through the abdomen and pelvis. Coronal and sagittal reformations were performed.   Comparison:   Abdomen: Lung Bases: Normal. Liver: Normal Gallbladder/Bile ducts: Cholecystectomy. Spleen: Spleen not enlarged. Pancreas: No pancreatitis. Adrenal glands: Normal. Kidneys/Ureters: Multiple bilateral renal cyst. No renal stone or hydronephrosis. Bowel/Mesentery: Appendix not visualized. No bowel wall thickening or bowel obstruction. Scattered diverticuli Lymph nodes: Normal. Vessels: Normal. Abdominal Wall: Normal. Pelvis: Hysterectomy Urinary Bladder: Normal. Lymph nodes:  Normal.   Bones: Degenerative change of the thoracolumbar spine.      Impression: Impression: No acute intra-abdominal pathology. No bowel obstruction. No renal stone or hydronephrosis. Cholecystectomy. Workstation:[id number]    XR chest portable  Result Date: 3/6/2025  Narrative: History: Cough, difficulty swallowing Technique: Single semierect portable AP frontal view of the chest Comparison: Chest CT January 12, 2023 Findings: Median sternotomy wires are unchanged. Cardiac silhouette and pulmonary vasculature are are upper normal, stable in size and contour. Mild right lung base interstitial coarsening, cicatrization is unchanged. Lungs and pleural spaces are otherwise clear. There is no pneumothorax. Bony thorax is intact.    Impression: IMPRESSION: Stable findings, including stable right infrahilar interstitial coarsening/cicatrization. No further acute radiographic cardiopulmonary process. Workstation:[id number]    CT abdomen pelvis with contrast  Result Date: 3/4/2025  Narrative: CT ABDOMEN AND PELVIS WITH IV CONTRAST INDICATION: Diarrhea, abdominal pain. . COMPARISON: CT 2/11/2025 and CT 8/26/2023 TECHNIQUE: CT examination of the abdomen and pelvis  was performed. Multiplanar 2D reformatted images were created from the source data. This examination, like all CT scans performed in the [facility], was performed utilizing techniques to minimize radiation dose exposure, including the use of iterative reconstruction and automated exposure control. Radiation dose length product (DLP) for this visit: 829 mGy-cm IV Contrast: 70 mL of iohexol Enteric Contrast: Not administered. FINDINGS: ABDOMEN LOWER CHEST: Unchanged 6 mm right middle lobe nodule. The stability since at least 2023 suggest benign etiologies. Mild bibasilar atelectasis. LIVER/BILIARY TREE: Unremarkable. GALLBLADDER: Post cholecystectomy. SPLEEN: Unremarkable. PANCREAS: Unremarkable. ADRENAL GLANDS: Unremarkable. KIDNEYS/URETERS: Simple appearing bilateral cysts. There is also a 1.4 cm proteinaceous lateral exophytic cyst arising from the interpolar right kidney. This measures 47 Hounsfield units postcontrast and 45 Hounsfield units on the prior noncontrast exam.  There are additional subcentimeter hypoattenuating renal lesion(s), too small to characterize but statistically likely benign, which do not warrant follow-up (Radiology June 2019). No hydronephrosis or urinary tract calculi. STOMACH AND BOWEL: The stomach and small bowel are unremarkable. There are scattered colonic diverticula, without evidence of acute inflammation. APPENDIX: No findings to suggest appendicitis. ABDOMINOPELVIC CAVITY: No ascites. No pneumoperitoneum. No lymphadenopathy. VESSELS: Unremarkable for patient's age. PELVIS REPRODUCTIVE ORGANS: Post hysterectomy. URINARY BLADDER: Unremarkable. ABDOMINAL WALL/INGUINAL REGIONS: Postsurgical changes of the anterior abdominal wall. Moderate rectus diastases. BONES: No acute fracture or suspicious osseous lesion.     Impression: 1.  No identifiable acute abnormality to account for the patient's clinical presentation. 2.  Please refer to the report body for description of  other incidental, chronic and/or benign findings. Workstation performed: [id number]       EKG, Pathology, and Other Studies: Reviewed.    Code Status: Level 1 - Full Code    Advance Directive and Living Will: <no information>    Patient Strengths/Assets: family ties, patient is on a voluntary commitment, patient is willing to work on problems    Patient Barriers/Limitations: difficulty adapting, impaired cognition, limited support system, marital conflict, medical problems, poor support system    Suicide/Homicide Risk Assessment:    Risk of Harm to Self:   Nursing Suicide Risk Assessment Last 24 hours: C-SSRS Risk (Since Last Contact)  Calculated C-SSRS Risk Score (Since Last Contact): No Risk Indicated  Current Specific Risk Factors include:  current depressive symptoms, current anxiety symptoms, poor self care, poor reasoning, lack of support, health problems, social isolation  Protective Factors: being a parent, being , having pets  Based on today's assessment, Yajaira presents the following risk of harm to self: low    Risk of Harm to Others:  Nursing Homicide Risk Assessment: Violence Risk to Others: Denies within past 6 months  Current Specific Risk Factors include: none  Protective Factors: no current homicidal ideation  Based on today's assessment, Yajaira presents the following risk of harm to others: low    The following interventions are recommended: Behavioral Health checks for safety monitoring, continued hospitalization on locked unit    Counseling / Coordination of Care:    Patient's presentation on admission and proposed treatment plan discussed with treatment team.  Events leading to admission reviewed with patient.  Importance of medication and treatment compliance reviewed with patient.  Supportive therapy provided to patient.    Inpatient Psychiatric Certification:    Estimated length of stay: 14 midnights    Based upon physical, mental and social evaluations, I certify that inpatient  psychiatric services are medically necessary for this patient for a duration of 14 midnights for the treatment of Severe episode of recurrent major depressive disorder, without psychotic features (HCC)  Available alternative community resources do not meet the patient's mental health care needs.  I further attest that an established written individualized plan of care has been implemented and is outlined in the patient's medical records.      Next of Kin  Extended Emergency Contact Information  Primary Emergency Contact: jocelyne murphy  Mobile Phone: [phone]  Relation: Daughter  Secondary Emergency Contact: Nikko Marsh  Address: [address] [place] [address] [place] [id number] [facility]  Home Phone: [phone]  Mobile Phone: [phone]  Relation: Spouse    Fede Werner MD    Board Certified Diplomate of American Board of Psychiatry and Neurology  Attending Psychiatrist   [facility]    This note was completed in part utilizing Dragon dictation Software. Grammatical, translation, syntax errors, random word insertions, spelling mistakes, and incomplete sentences may be an occasional consequence of this system secondary to software limitations with voice recognition, ambient noise, and hardware issues. If you have any questions or concerns about the content, text, or information contained within the body of this dictation, please contact the provider for clarification.

## 2025-03-25 NOTE — PLAN OF CARE
Problem: Ineffective Coping  Goal: Cooperates with admission process  Description: Interventions: - Complete admission process  Outcome: Progressing  Goal: Identifies ineffective coping skills  Outcome: Progressing  Goal: Identifies healthy coping skills  Outcome: Progressing  Goal: Demonstrates healthy coping skills  Outcome: Progressing  Goal: Participates in unit activities  Description: Interventions:- Provide therapeutic environment - Provide required programming - Redirect inappropriate behaviors   Outcome: Progressing  Goal: Patient/Family participate in treatment and DC plans  Description: Interventions:- Provide therapeutic environment  Outcome: Progressing  Goal: Patient/Family verbalizes awareness of resources  Outcome: Progressing  Goal: Understands least restrictive measures  Description: Interventions:- Utilize least restrictive behavior  Outcome: Progressing  Goal: Free from restraint events  Description: - Utilize least restrictive measures - Provide behavioral interventions - Redirect inappropriate behaviors   Outcome: Progressing

## 2025-03-25 NOTE — NURSING NOTE
Spoke with daughter and update given of status and medical condition. Patient had given verbal consent.

## 2025-03-25 NOTE — ASSESSMENT & PLAN NOTE
Lab Results   Component Value Date    EGFR 20 03/25/2025    EGFR 36 03/24/2025    EGFR 39 03/23/2025    CREATININE 2.22 (H) 03/25/2025    CREATININE 1.38 (H) 03/24/2025    CREATININE 1.31 (H) 03/23/2025   - f/u [facility] recs

## 2025-03-25 NOTE — CMS CERTIFICATION NOTE
Certification: Based upon physical, mental and social evaluations, I certify that inpatient psychiatric services are medically necessary for this patient for a duration of 14 midnights for the treatment of Severe episode of recurrent major depressive disorder, without psychotic features (HCC)  Available alternative community resources do not meet the patient's mental health care needs.  I further attest that an established written individualized plan of care has been implemented and is outlined in the patient's medical records.

## 2025-03-25 NOTE — PROGRESS NOTES
03/25/25 0800   Team Meeting   Meeting Type Daily Rounds   Team Members Present   Team Members Present Physician;Nurse;   Physician Team Member Debbi/Lauryn/Amy/Lorna   Nursing Team Member Jayy/Colton   Care Management Team Member Balwinder PAGE   Patient/Family Present   Patient Present No   Patient's Family Present No     Patient here as a 201 for extreme anxiety and lack of sleep. She is calm and pleasant with admission process. Patient was in the ED the day before admission but then came back and was admitted. Patient discharge is pending stabilization of mood and medications.

## 2025-03-25 NOTE — TREATMENT PLAN
TREATMENT PLAN REVIEW - Behavioral Health Yajaira Marsh 79 y.o. 1945 female MRN: [id number]    [facility] 6B O[facility]HU Room / Bed: [facility] 648/[facility] 648-02 Encounter: [id number]          Admit Date/Time:  3/24/2025  1:55 PM    Treatment Team:   MD Juanita Conti CRNP Erin Roman Olivia Steward Jacqueline Almonte Eleanor Soares, RN Daniel Teles Keri-Ann N Teitsworth Joselyn Olivo    Diagnosis: Principal Problem:    Severe episode of recurrent major depressive disorder, without psychotic features ([facility])  Active Problems:    MADAN (generalized anxiety disorder)    CAD (coronary artery disease)    Hyperlipidemia    Hypothyroidism    SARAY (obstructive sleep apnea)    Medical clearance for psychiatric admission    Type 2 diabetes mellitus with hyperglycemia, with long-term current use of insulin (HCC)    KATIE (acute kidney injury) ([facility])    Stage 3b chronic kidney disease ([facility])    Essential hypertension    Hypokalemia    Depressive disorder      Patient Strengths/Assets: family ties, negotiates basic needs, patient is on a voluntary commitment    Patient Barriers/Limitations: difficulty adapting, marital conflict, poor reasoning ability, poor support system    Short Term Goals: decrease in depressive symptoms, decrease in anxiety symptoms, ability to stay safe on the unit, ability to stay free of restraints, improvement in ability to express basic needs, improvement in insight, improvement in reality testing, improvement in self care, sleep improvement, increase in socialization with peers on the unit    Long Term Goals: improvement in depression, improvement in anxiety, stabilization of mood, free of suicidal thoughts, free of homicidal thoughts, improvement in reality testing, improved insight, able to express basic needs, acceptance of need for psychiatric medications, acceptance of need for psychiatric follow up after discharge, stable living arrangements  upon discharge, establishment of family support upon discharge    Progress Towards Goals: starting psychiatric medications as prescribed    Recommended Treatment: medication management, patient medication education, group therapy, milieu therapy, continued Behavioral Health psychiatric evaluation/assessment process    Treatment Frequency: daily medication monitoring, group and milieu therapy daily, monitoring through interdisciplinary rounds, monitoring through weekly patient care conferences    Expected Discharge Date:  14 days    Discharge Plan: referral for outpatient medication management with a psychiatrist, referral for outpatient psychotherapy, return to previous living arrangement    Treatment Plan Created/Updated By: Fede Werner MD

## 2025-03-25 NOTE — PROGRESS NOTES
03/25/25 1451   Team Meeting   Meeting Type Tx Team Meeting   Initial Conference Date 03/25/25   Next Conference Date 04/24/25   Team Members Present   Team Members Present Physician;Nurse;;Other (Discipline and Name)   Physician Team Member Debbi   Nursing Team Member Emily   Social Work Team Member Layne KIMBALL   Patient/Family Present   Patient Present Yes   Patient's Family Present No     Treatment plan reviewed with patient. Patient's in agreement with treatment plan. Patient signed initial treatment plan.

## 2025-03-25 NOTE — TREATMENT TEAM
03/25/25 0859   Provider Notification   Reason for Communication Evaluate   Provider Name Marieltalha ALAN   Provider Role Hospitalist   Method of Communication Other (Comment)   Response See orders   Notification Time 0810   Shift Event Other (Comment)  (medication r/tVS)     Provider notified to clarify cardiac meds r/t VS. Cardizem decreased and instructed to hold Losartan.

## 2025-03-25 NOTE — NURSING NOTE
"Yajaira c/o anxiety and loneliness at home. Denies SI or any hallucinations. Reports that she \"hates\" her new house. ER RN came and started IV #20 right upper arm, IVF infusing as per order. Patient encouraged to drink fluids and has increased water intake. Patient has fair appetite, reports that she does not like most of the food. Patient also reports that she is a picky eater. Yajaira had a CT scan as per order, went to radiology via w/c and with RN. Yajaira is medication compliant. Attended 1 group. Will continue to monitor and support during treatment.  "

## 2025-03-25 NOTE — TREATMENT TEAM
Pt attended 2 groups.    03/25/25 0900 03/25/25 1000 03/25/25 1300   Activity/Group Checklist   Group Exercise Other (Comment)  (Group Art Therapy/Psychodynamic, Open Choice followed by Process Discussion) Pet therapy   Attendance Attended Did not attend Did not attend   Attendance Duration (min) 31-45  --   --    Interactions Interacted appropriately  --   --    Affect/Mood Blunted/flat;Calm  --   --    Goals Achieved Able to listen to others;Able to engage in interactions  --   --       03/25/25 1345   Activity/Group Checklist   Group Self Esteem   Attendance Attended   Attendance Duration (min) 46-60   Interactions Other (Comment)  (scant)   Affect/Mood Other (Comment)  (anxiety)   Goals Achieved Identified triggers;Identified feelings;Discussed coping strategies;Discussed self-esteem issues;Able to listen to others

## 2025-03-25 NOTE — ASSESSMENT & PLAN NOTE
Lab Results   Component Value Date    HGBA1C 11.1 (H) 03/07/2025       Recent Labs     03/24/25  0832 03/24/25  1300 03/24/25  1659 03/24/25  2221   POCGLU 189* 216* 230* 182*   - f/u [facility] recs    Blood Sugar Average: Last 72 hrs:  (P) 206

## 2025-03-25 NOTE — NURSING NOTE
Patient remains visible and tolerated IV fluids well. Also increased PO fluids. Attending groups. Emotional support often. Appetite fair.

## 2025-03-26 ENCOUNTER — RESULTS FOLLOW-UP (OUTPATIENT)
Dept: EMERGENCY DEPT | Facility: HOSPITAL | Age: 80
End: 2025-03-26

## 2025-03-26 ENCOUNTER — TELEPHONE (OUTPATIENT)
Age: 80
End: 2025-03-26

## 2025-03-26 LAB
ANION GAP SERPL CALCULATED.3IONS-SCNC: 11 MMOL/L (ref 4–13)
BACTERIA UR CULT: ABNORMAL
BACTERIA UR CULT: ABNORMAL
BUN SERPL-MCNC: 18 MG/DL (ref 5–25)
CALCIUM SERPL-MCNC: 8.4 MG/DL (ref 8.4–10.2)
CHLORIDE SERPL-SCNC: 109 MMOL/L (ref 96–108)
CO2 SERPL-SCNC: 19 MMOL/L (ref 21–32)
CREAT SERPL-MCNC: 1.78 MG/DL (ref 0.6–1.3)
GFR SERPL CREATININE-BSD FRML MDRD: 26 ML/MIN/1.73SQ M
GLUCOSE P FAST SERPL-MCNC: 197 MG/DL (ref 65–99)
GLUCOSE SERPL-MCNC: 145 MG/DL (ref 65–140)
GLUCOSE SERPL-MCNC: 153 MG/DL (ref 65–140)
GLUCOSE SERPL-MCNC: 190 MG/DL (ref 65–140)
GLUCOSE SERPL-MCNC: 197 MG/DL (ref 65–140)
GLUCOSE SERPL-MCNC: 217 MG/DL (ref 65–140)
HIV 1+2 AB+HIV1 P24 AG SERPL QL IA: NORMAL
MAGNESIUM SERPL-MCNC: 1.2 MG/DL (ref 1.9–2.7)
PHOSPHATE SERPL-MCNC: 3.4 MG/DL (ref 2.3–4.1)
POTASSIUM SERPL-SCNC: 3.1 MMOL/L (ref 3.5–5.3)
SODIUM SERPL-SCNC: 139 MMOL/L (ref 135–147)
TREPONEMA PALLIDUM IGG+IGM AB [PRESENCE] IN SERUM OR PLASMA BY IMMUNOASSAY: NORMAL

## 2025-03-26 PROCEDURE — 99232 SBSQ HOSP IP/OBS MODERATE 35: CPT | Performed by: STUDENT IN AN ORGANIZED HEALTH CARE EDUCATION/TRAINING PROGRAM

## 2025-03-26 PROCEDURE — 80048 BASIC METABOLIC PNL TOTAL CA: CPT | Performed by: NURSE PRACTITIONER

## 2025-03-26 PROCEDURE — 86780 TREPONEMA PALLIDUM: CPT | Performed by: NURSE PRACTITIONER

## 2025-03-26 PROCEDURE — 97163 PT EVAL HIGH COMPLEX 45 MIN: CPT

## 2025-03-26 PROCEDURE — 83735 ASSAY OF MAGNESIUM: CPT | Performed by: NURSE PRACTITIONER

## 2025-03-26 PROCEDURE — 84100 ASSAY OF PHOSPHORUS: CPT | Performed by: NURSE PRACTITIONER

## 2025-03-26 PROCEDURE — 87389 HIV-1 AG W/HIV-1&-2 AB AG IA: CPT | Performed by: STUDENT IN AN ORGANIZED HEALTH CARE EDUCATION/TRAINING PROGRAM

## 2025-03-26 PROCEDURE — 82948 REAGENT STRIP/BLOOD GLUCOSE: CPT

## 2025-03-26 RX ORDER — NYSTATIN 100000 [USP'U]/G
POWDER TOPICAL 2 TIMES DAILY
Status: DISCONTINUED | OUTPATIENT
Start: 2025-03-26 | End: 2025-03-28 | Stop reason: HOSPADM

## 2025-03-26 RX ORDER — POTASSIUM CHLORIDE 1500 MG/1
40 TABLET, EXTENDED RELEASE ORAL EVERY 4 HOURS
Status: COMPLETED | OUTPATIENT
Start: 2025-03-26 | End: 2025-03-26

## 2025-03-26 RX ORDER — LANOLIN ALCOHOL/MO/W.PET/CERES
800 CREAM (GRAM) TOPICAL 2 TIMES DAILY
Status: DISCONTINUED | OUTPATIENT
Start: 2025-03-26 | End: 2025-03-28 | Stop reason: HOSPADM

## 2025-03-26 RX ADMIN — Medication 800 MG: at 08:30

## 2025-03-26 RX ADMIN — POTASSIUM CHLORIDE 40 MEQ: 1500 TABLET, EXTENDED RELEASE ORAL at 12:08

## 2025-03-26 RX ADMIN — ISOSORBIDE MONONITRATE 30 MG: 30 TABLET, EXTENDED RELEASE ORAL at 08:31

## 2025-03-26 RX ADMIN — INSULIN LISPRO 1 UNITS: 100 INJECTION, SOLUTION INTRAVENOUS; SUBCUTANEOUS at 21:20

## 2025-03-26 RX ADMIN — VENLAFAXINE HYDROCHLORIDE 75 MG: 75 CAPSULE, EXTENDED RELEASE ORAL at 08:30

## 2025-03-26 RX ADMIN — NYSTATIN: 100000 POWDER TOPICAL at 17:28

## 2025-03-26 RX ADMIN — DILTIAZEM HYDROCHLORIDE 120 MG: 120 CAPSULE, COATED, EXTENDED RELEASE ORAL at 08:31

## 2025-03-26 RX ADMIN — CYANOCOBALAMIN TAB 1000 MCG 1000 MCG: 1000 TAB at 08:30

## 2025-03-26 RX ADMIN — Medication 800 MG: at 17:27

## 2025-03-26 RX ADMIN — PRAVASTATIN SODIUM 40 MG: 40 TABLET ORAL at 16:32

## 2025-03-26 RX ADMIN — INSULIN GLARGINE 7 UNITS: 100 INJECTION, SOLUTION SUBCUTANEOUS at 21:19

## 2025-03-26 RX ADMIN — ASPIRIN 81 MG CHEWABLE TABLET 81 MG: 81 TABLET CHEWABLE at 08:31

## 2025-03-26 RX ADMIN — LEVOTHYROXINE SODIUM 75 MCG: 0.07 TABLET ORAL at 08:30

## 2025-03-26 RX ADMIN — INSULIN LISPRO 2 UNITS: 100 INJECTION, SOLUTION INTRAVENOUS; SUBCUTANEOUS at 16:41

## 2025-03-26 RX ADMIN — Medication 2000 UNITS: at 08:30

## 2025-03-26 RX ADMIN — POTASSIUM CHLORIDE 40 MEQ: 1500 TABLET, EXTENDED RELEASE ORAL at 08:30

## 2025-03-26 RX ADMIN — Medication 3 MG: at 21:19

## 2025-03-26 RX ADMIN — PANTOPRAZOLE SODIUM 40 MG: 40 TABLET, DELAYED RELEASE ORAL at 08:30

## 2025-03-26 RX ADMIN — NYSTATIN: 100000 POWDER TOPICAL at 12:22

## 2025-03-26 RX ADMIN — TRAZODONE HYDROCHLORIDE 50 MG: 50 TABLET ORAL at 21:19

## 2025-03-26 RX ADMIN — INSULIN LISPRO 2 UNITS: 100 INJECTION, SOLUTION INTRAVENOUS; SUBCUTANEOUS at 12:11

## 2025-03-26 NOTE — SOCIAL WORK
03/25/25 1500   Referral Data   Referral Source Patient   Referral Reason Psych   County Information   [facility]   Readmission Root Cause   30 Day Readmission No   Patient Information   Mental Status Alert   Primary Caregiver Self   Accompanied by/Relationship None   Support System Immediate family   Legal Information   Tx Plan Signed Yes   Current Status: 201   Legal Issues Patient denies   Advance Directives Power of  for health care;Power of  for finance  (Spouse)   Advance Directives Status Discussed - Information Provided   Activities of Daily Living Prior to Admission   Functional Status Independent   Assistive Device No device needed   Living Arrangement House;Lives with someone   Ambulation Independent   Access to Firearms   Access to Firearms Yes   Describe Access to Weapons Antique gun. No bullets.Wall decoration   Is there someone that can secure the firearms? Yes -  will be notified and asked to secure weapon   Income Information   Income Source SSI/SSD  ($1292 + 69.07)   Means of Transportation   Means of Transport to [facility]: Family transport      03/26/25 1131   Admission   Admission Source Emergency department   Status of Admission 201   Why are you here? Decreased sleep, increased anxiety, ruminating thoughts, isolated   Events leading to  inpatient admission I was crying and had decreased sleep. I had decreased appetite.   Patient Strengths Motivated;Financially secure;Cooperative;Good support system;Reasoning ability;Interpersonal skills;Family ties   Patient Limitations Poor insight   Informed Consent Patient Bill of Rights given   Release of Information Signed Yes  (Insurance, daughter, spouse, PCP)     Admission Status    Status of admission 201   [facility]     Patient Intake   Address to discharge to [address] [place] [address] [place] [address]   Living Arrangement Patient resides in a home with her spouse    Can patient  return home Yes    Patient's Telephone Number [phone]   Patient's e-mail Address [email or web address]   Insurance [employer]vantage   PCP Dr. Clemens    Education High School   Type of work Retired     History None   Access to Firearms Decorative, antique gun without bullets    Marital Status/Children / 2 children   Spirituality/Islam    Transportation  transports patient's to her appointments   Preferred Pharmacy [facility]      Patient History   Presenting Problem Patient reports increased anxiety, decreased sleep, increased depression. Feeling isolated and decreased appetite.    Stressor/Trigger Patient's feeling isolated and worries about her decreased sleep   Treatment History [facility]    Current psychiatrist/therapist None    ACT/ICM None    Family History of Mental Health None    Suicide Attempts None   Legal Issues None    Trauma/Psychosocial loss Patient denies      Substance Abuse Assessment   UDS:Negative  Audit Score:  Nicotine/Tobacco:   Substance First use Last Use and amount Frequency Amount Used How long Longest period of sobriety and when Method of use   THC            Heroin            Cocaine            ETOH            Meth            Benzos            Other:            History of JENNIFER    Family History of JENNIFER    Prior Inpatient JENNIFER Treatment    Current Outpatient treatment    Response to Referral      Referrals/ROIs   Referrals Needed    ROIs Signed

## 2025-03-26 NOTE — NURSING NOTE
Yajaira is pleasant and cooperative. Yajaira is medication compliant. Notified medical this am about red areas in groin and abd fold; treatment started. Yajaira still endorses anxiety but denies all other s/s of psych. Yajaira is visible in the milieu, interacts with both staff and peers. She is attending groups. Appetite is fair for meals, continues to refuse supplements ordered. Will continue to monitor and support during treatment.

## 2025-03-26 NOTE — DISCHARGE INSTR - OTHER ORDERS
You are being discharged to your home at [address] [place] [address].     If you or someone you know is struggling or in crisis, help is available. Call or text 455 or chat [facility].org. You can also reach Crisis Text Line by texting [facility] to 117143.    *[facility] Crisis Intervention: 409.238.6139  *National Suicide Prevention Lifeline:  1-129.330.6756  *Peer Support Talk Line (Seven days a week, 1:00 PM - 9:00 PM) Call: 874.345.4894 or Text: 857.223.5540  *National Harvey on Mental Illness ([facility]) HELPLINE: 194.436.6540/Website: www.marley.org  *Substance Abuse and Mental Health Services Administration([facility]) National Helpline, which is a confidential, free, 24-hour-a-day, 365-day-a-year, information service for individuals and family members facing mental health and/or substance use disorders. This service provides referrals to local treatment facilities, support groups, and community-based organizations. Callers can also order free publications and other information.  Call 1-697.294.1311/Website: www.[facility].gov  *United Way 2-1-1: This is a toll free, confidential, 24-hour-a-day service which connects you to a community  in your area who can help you find services and resources that are available to you locally and provide critical services that can improve and save lives.  Call: 211  /Website: http://www.[facility].org/     [facility] Agency [facility] Aging    Tran Monroe  Director of Area Agency on Aging  [phone]  The mission of the [facility] Agency on Aging is to plan and coordinate the provision of services to the Older Adults of [facility]. These services, delivered directly, via purchase of services from a community agency, or by cooperative arrangement, are designed to enhance the independence and dignity of older adults.

## 2025-03-26 NOTE — NURSING NOTE
Pt was visible in the milieu and social with selective peers. Endorses anxiety, denies all other psych s/s. No behaviors noted. No c/o pain. Compliant with HS medications. Safety maintained. Will continue to monitor.

## 2025-03-26 NOTE — ASSESSMENT & PLAN NOTE
- h/o MDD and MADAN w/ panic attacks, previously in outpatient treatment by Dr. Miller (2020 until 2022), currently on Effexor 37.5 mg daily (recently started by PCP) and Xanax 0.5 mg BID presented with acute exacerbation of depression and anxiety in the context of social isolation and psychosocial stressors over past two years since moved to the [facility].  - Increased Effexor to 75 mg po daily on 3/26/25; doses to be adjusted slowly based on CrCl.  - Xanax to be tapered off as tolerated due to concerns for MCI and fall risk  - Melatonin 3 mg nightly  - Trazodone 50 mg nightly for insomnia  - Group and milieu therapy

## 2025-03-26 NOTE — PLAN OF CARE
Pt attends group and visible.   Problem: Individualized Interventions  Goal: Attend and participate in unit activities, including therapeutic, recreational, and educational groups  Outcome: Progressing

## 2025-03-26 NOTE — PROGRESS NOTES
03/26/25 0800   Team Meeting   Meeting Type Daily Rounds   Team Members Present   Team Members Present Physician;Nurse;   Physician Team Member Debbi/Lauryn/Amy/Lorna   Nursing Team Member Jayy/Colton   Care Management Team Member Balwinder PAGE   Social Work Team Member Joseph   Patient/Family Present   Patient Present No   Patient's Family Present No     Patient is calm and cooperative. She denies signs and symptoms and attends group. Her labs were completed and she was given a Bolus. She is eating relatively well. She states that she has been isolated in her new living arrangement and has an anticipated anxiety leading to insomnia. Effexor is to be increased 75mg and trazodone was added. She slept well. Patient discharge is pending stabilization of mood and medications.

## 2025-03-26 NOTE — SOCIAL WORK
Doc to doc review completed. This writer spoke with patient's daughter and discussed patient's treatment and discharge plan. This writer discussed discharge on Friday. Daughter can provide transportation home.   Ambulatory referral for OP Psych  services submitted [facility]. This writer requested medication management and talk therapy. Appointment scheduled for 4/24 at 0800 and 4/29 at 1300.   PCP appointment scheduled. This writer spoke with [facility] regarding their program. An appointment's scheduled for the patient to tour and complete intake of center.

## 2025-03-26 NOTE — PROGRESS NOTES
Progress Note - Behavioral Health   Name: Yajaira Marsh 79 y.o. female I MRN: [id number]  Unit/Bed#: [facility] 648-02 I Date of Admission: 3/24/2025   Date of Service: 3/26/2025 I Hospital Day: 2     Assessment & Plan  Severe episode of recurrent major depressive disorder, without psychotic features (HCC)  - h/o MDD and MADAN w/ panic attacks, previously in outpatient treatment by Dr. Miller (2020 until 2022), currently on Effexor 37.5 mg daily (recently started by PCP) and Xanax 0.5 mg BID presented with acute exacerbation of depression and anxiety in the context of social isolation and psychosocial stressors over past two years since moved to the [facility].  - Increased Effexor to 75 mg po daily on 3/26/25; doses to be adjusted slowly based on CrCl.  - Xanax to be tapered off as tolerated due to concerns for MCI and fall risk  - Melatonin 3 mg nightly  - Trazodone 50 mg nightly for insomnia  - Group and milieu therapy  MADAN (generalized anxiety disorder)  - Management as per principal problem    MCI (mild cognitive impairment)  - SLUMS: 24/30 on 3/25/25  - Fall and delirium precaution  - PT eval  CAD (coronary artery disease)  - f/u SLIM recs  Hyperlipidemia  - f/u SLIM recs  Hypothyroidism  - f/u SLIM recs  SARAY (obstructive sleep apnea)  - outpatient f/u w/ sleep medicine recommended  Type 2 diabetes mellitus with hyperglycemia, with long-term current use of insulin ([facility])  Lab Results   Component Value Date    HGBA1C 11.1 (H) 03/07/2025       Recent Labs     03/25/25  1137 03/25/25  1550 03/25/25  2102 03/26/25  0734   POCGLU 218* 166* 178* 145*   - f/u SLIM recs    Blood Sugar Average: Last 72 hrs:  (P) 186.5    KATIE (acute kidney injury) (HCC)  - f/u SLIM recs  Stage 3b chronic kidney disease ([facility])  Lab Results   Component Value Date    EGFR 26 03/26/2025    EGFR 20 03/25/2025    EGFR 36 03/24/2025    CREATININE 1.78 (H) 03/26/2025    CREATININE 2.22 (H) 03/25/2025    CREATININE 1.38 (H) 03/24/2025   - f/u SLIM  recs  Essential hypertension  - f/u SLIM recs  Hypokalemia  - f/u SLIM recs  Fall        Current medications:  Current Facility-Administered Medications   Medication Dose Route Frequency Provider Last Rate    acetaminophen  650 mg Oral Q4H PRN Juanita Ruizsuzi, CRNP      acetaminophen  650 mg Oral Q4H PRN Juanita Ruizsuzi, CRNP      acetaminophen  975 mg Oral Q6H PRN Juanita Nevarez Charity, CRNP      aluminum-magnesium hydroxide-simethicone  30 mL Oral Q4H PRN Juanita Umañabhumi Nash, CRNP      aspirin  81 mg Oral Daily Juanita Umañabhumi Nash, CRNP      benztropine  1 mg Intramuscular Q4H PRN Max 6/day Juanita Umañabhumi Nash, CRNP      benztropine  0.5 mg Oral Q4H PRN Max 6/day Juanita Nevarez Charity, CRNP      cholecalciferol  2,000 Units Oral Daily Juanita Nevarez Charity, CRNP      cyanocobalamin  1,000 mcg Oral Daily Juanita Umañabhumi Nash, CRNP      diltiazem  120 mg Oral Daily Juanita Umañabhumi Nash, CRNP      hydrOXYzine HCL  25 mg Oral Q6H PRN Max 4/day Juanita Nevarez Charity, CRNP      hydrOXYzine HCL  50 mg Oral Q6H PRN Max 4/day Juanita Umañabhumi Nash, CRNP      insulin glargine  7 Units Subcutaneous HS Juanita Nevarez Charity, CRNP      insulin lispro  1-6 Units Subcutaneous TID AC Juanita Umañabhumi Nash, CRNP      insulin lispro  1-6 Units Subcutaneous HS Juanita Umañabhumi Nash, CRNP      isosorbide mononitrate  30 mg Oral Daily Juanita Umañabhumi Nash, CRNP      levothyroxine  75 mcg Oral Daily Juanita Umañabhumi Nash, CRNP      LORazepam  1 mg Intramuscular Q6H PRN Max 3/day Juanita Geri Nash, CRNP      LORazepam  1 mg Oral Q6H PRN Max 3/day Juanita Umañabhumi Nash, CRNP      magnesium Oxide  800 mg Oral BID Juanita Geri Nash, CRNP      melatonin  3 mg Oral HS Fede Werner MD      mirtazapine  7.5 mg Oral HS PRN Fede Werner MD      nystatin   Topical BID ALAN Perez      OLANZapine  5 mg Intramuscular Q3H PRN Max 3/day ALAN Perez      OLANZapine   2.5 mg Oral Q4H PRN Max 6/day Juanita Nash, CRNP      OLANZapine  5 mg Oral Q4H PRN Max 3/day Juanita Nash, CRNP      OLANZapine  5 mg Oral Q3H PRN Max 3/day Juanita Nash, CRNP      pantoprazole  40 mg Oral Daily Juanita Nash, CRNP      polyethylene glycol  17 g Oral Daily PRN Juanita Nash, CRNP      potassium chloride  40 mEq Oral Q4H Juanita Nash, CRNP      pravastatin  40 mg Oral Daily With Dinner Juanita Nash, CRNP      propranolol  5 mg Oral Q8H PRN Juanita Nash, CRNP      traZODone  50 mg Oral HS Fede Werner MD      venlafaxine  75 mg Oral Daily Fede Werner MD          Risks/Benefits of Treatment:   Risks, benefits, and possible side effects of medications explained to patient and patient verbalizes understanding and agreement for treatment.    Progress Toward Goals: improving slowly    Treatment Planning:    - Encourage early mobility and having a structured day  - Provide frequent re-orientation, and cognitive stimulation  - Ensure assistive devices are in proper working order (eye-glasses, hearing aids)  - Encourage adequate hydration, nutrition and monitor bowel movements  - Maintain sleep-wake cycle: Uninterrupted sleep time; low-level lighting at night  - Fall precaution  - f/u SLIM recs regarding the medical problems   - f/u labs  - Continue medication titration and treatment plan; adjust medication to optimize treatment response and as clinically indicated. .   - Observation: routine            - VS: as per unit protocol  - Legal status: 201  - Diet: Diabetic diet  - Encourage group attendance and milieu therapy  - Dispo: To be determined   -   - Estimated Discharge Day: 4/2/2025     Subjective     Patient was visited on unit for continuing care; chart reviewed and discussed with multidisciplinary treatment team.  On approach, the patient was calm and cooperative. Endorsed better mood but continues to have negative  thinking and ruminating thoughts. Continues to report feeling anxious (scored 5/10), but denied any panic attacks since yesterday. Denied any changes in appetite, and energy level. No problem initiating and maintaining sleep.  Denied A/VH currently.  Denied SI/HI, intent or plan upon direct inquiry at this time.    Patient continues to be visible in the milieu and interacts with staff and peers. No reports of aggression or self-injurious behavior on unit. No PRN medications used in the past 24 hours.    Patient accepted all offered medications and no adverse effects of medications noted or reported. Effexor is being uptitrated to 75 mg daily. The patient received IV bolus as per SLIM yesterday but developed hypokalemia. EKG ordered and will continue to f/u SLIM recs; lytes to be repleted as indicated.    Sleep: improved  Appetite: improving  Medication side effects: No  ROS: review of systems as noted above in HPI/Subjective report, all other systems are negative    Objective :  Temp:  [96.3 °F (35.7 °C)-97.1 °F (36.2 °C)] 97.1 °F (36.2 °C)  HR:  [60-85] 85  BP: (129-163)/(63-74) 129/63  Resp:  [18] 18  SpO2:  [95 %-98 %] 95 %  O2 Device: None (Room air)      Mental Status Evaluation:  Appearance and attitude: appeared as stated age, dressed in hospital attire, with good hygiene  Eye contact: good  Motor Function: within normal limits, No PMA/PMR  Gait/station:  needs assistive device  Speech: normal for rate, rhythm, volume, latency, amount  Language: No overt abnormality  Mood/affect: anxious, less depressed / Affect was constricted but reactive, mood congruent  Thought Processes: linear, concrete  Thought content: denied suicidal ideations or homicidal ideations, no overt delusions elicited, negative thinking, ruminations  Associations: concrete associations  Perceptual disturbances: denies Auditory/Visual/Tactile Hallucinations  Orientation: oriented to time, person, place and to the situational  context  Cognitive Function: intact  Memory: impaired recall; intact long-term  Fund of knowledge: aware of current events, aware of past history, and vocabulary average  Impulse control: good  Insight/judgment: fair/fair            Lab Results: I have reviewed the following results:  Results from the past 24 hours:   Recent Results (from the past 24 hours)   Fingerstick Glucose (POCT)    Collection Time: 03/25/25 11:37 AM   Result Value Ref Range    POC Glucose 218 (H) 65 - 140 mg/dl   Fingerstick Glucose (POCT)    Collection Time: 03/25/25  3:50 PM   Result Value Ref Range    POC Glucose 166 (H) 65 - 140 mg/dl   Fingerstick Glucose (POCT)    Collection Time: 03/25/25  9:02 PM   Result Value Ref Range    POC Glucose 178 (H) 65 - 140 mg/dl   Basic metabolic panel    Collection Time: 03/26/25  4:43 AM   Result Value Ref Range    Sodium 139 135 - 147 mmol/L    Potassium 3.1 (L) 3.5 - 5.3 mmol/L    Chloride 109 (H) 96 - 108 mmol/L    CO2 19 (L) 21 - 32 mmol/L    ANION GAP 11 4 - 13 mmol/L    BUN 18 5 - 25 mg/dL    Creatinine 1.78 (H) 0.60 - 1.30 mg/dL    Glucose 197 (H) 65 - 140 mg/dL    Glucose, Fasting 197 (H) 65 - 99 mg/dL    Calcium 8.4 8.4 - 10.2 mg/dL    eGFR 26 ml/min/1.73sq m   Magnesium    Collection Time: 03/26/25  4:43 AM   Result Value Ref Range    Magnesium 1.2 (L) 1.9 - 2.7 mg/dL   Phosphorus    Collection Time: 03/26/25  4:43 AM   Result Value Ref Range    Phosphorus 3.4 2.3 - 4.1 mg/dL   Fingerstick Glucose (POCT)    Collection Time: 03/26/25  7:34 AM   Result Value Ref Range    POC Glucose 145 (H) 65 - 140 mg/dl       Administrative Statements     Counseling / Coordination of Care:   Patient's progress discussed with staff in treatment team meeting.  Medications, treatment progress and treatment plan reviewed with patient.  Medication changes discussed with patient.  Medication education provided to patient.  Supportive therapy provided to patient.  Cognitive techniques utilized during the  session.  Reassurance and supportive therapy provided.  Reoriented to reality and reassured.  Encouraged participation in milieu and group therapy on the unit.  Crisis/safety plan discussed with patient.       Fede Werner MD  Attending Psychiatrist   [facility]    03/26/25     This note was completed in part utilizing Dragon dictation Software. Grammatical, translation, syntax errors, random word insertions, spelling mistakes, and incomplete sentences may be an occasional consequence of this system secondary to software limitations with voice recognition, ambient noise, and hardware issues. If you have any questions or concerns about the content, text, or information contained within the body of this dictation, please contact the provider for clarification.

## 2025-03-26 NOTE — DISCHARGE INSTR - APPOINTMENTS
Behavioral Health Nurse Navigator, Monica or Johana will be calling you after your discharge, on the phone number that you provided.  They will be available as an additional support, if needed.   If you wish to speak with Monica, you may contact her at [phone].

## 2025-03-26 NOTE — ASSESSMENT & PLAN NOTE
Lab Results   Component Value Date    HGBA1C 11.1 (H) 03/07/2025       Recent Labs     03/25/25  1137 03/25/25  1550 03/25/25  2102 03/26/25  0734   POCGLU 218* 166* 178* 145*   - f/u SLIM recs    Blood Sugar Average: Last 72 hrs:  (P) 186.5

## 2025-03-26 NOTE — TELEPHONE ENCOUNTER
Darshana Tillman  Good Afternoon,    Patient has been scheduled for the following in [facility] - 4/24 at [address]  Therapy - 4/29 at 100 with ROSS Mohamud

## 2025-03-26 NOTE — TREATMENT TEAM
Pt attended all groups.  Pt anxious and able to self express.  Pt in afternoon did menton her parents  during covid and how she never got to say goodbye.  Pt also mentioned that her mother had dementia and felt guilty that she needed a higher level of care and unable to care for her at home.       25 0900 25 1000 25 1100   Activity/Group Checklist   Group Exercise Other (Comment)  (Community meeting: anxiety symptoms, fight or flight) Impromptu group (Comment)   Attendance Attended Attended Attended   Attendance Duration (min) 31-45 31-45 31-45   Interactions Interacted appropriately Other (Comment)  (needed prompting) Interacted appropriately   Affect/Mood Appropriate;Calm Other (Comment)  (anxious) Appropriate   Goals Achieved Able to listen to others;Able to engage in interactions Identified triggers;Identified feelings;Identified relapse prevention strategies;Discussed coping strategies;Discussed self-esteem issues;Able to reflect/comment on own behavior;Able to engage in interactions;Able to listen to others;Able to manage/cope with feelings Identified feelings;Discussed coping strategies;Displayed empathy;Identified resources and support systems;Able to listen to others;Able to engage in interactions;Able to self-disclose;Able to recieve feedback;Able to give feedback to another      25 1330   Activity/Group Checklist   Group Other (Comment)  ([facility] Recovery part 2: getting back in control of anxiety/anger)   Attendance Attended   Attendance Duration (min) 46-60   Interactions Interacted appropriately   Affect/Mood Appropriate   Goals Achieved Identified feelings;Identified triggers;Identified relapse prevention strategies;Discussed coping strategies;Discussed self-esteem issues;Able to listen to others;Able to engage in interactions

## 2025-03-26 NOTE — ASSESSMENT & PLAN NOTE
Lab Results   Component Value Date    EGFR 26 03/26/2025    EGFR 20 03/25/2025    EGFR 36 03/24/2025    CREATININE 1.78 (H) 03/26/2025    CREATININE 2.22 (H) 03/25/2025    CREATININE 1.38 (H) 03/24/2025   - f/u SLIM recs

## 2025-03-26 NOTE — NURSING NOTE
EKG completed as per order. Also, was informed patient had redness in groin- left and under abdominal fold. Pictures taken. Medical aware of both noted. Will monitor.

## 2025-03-26 NOTE — PLAN OF CARE
Problem: Ineffective Coping  Goal: Cooperates with admission process  Description: Interventions: - Complete admission process  Outcome: Progressing  Goal: Identifies ineffective coping skills  Outcome: Progressing  Goal: Identifies healthy coping skills  Outcome: Progressing  Goal: Demonstrates healthy coping skills  Outcome: Progressing  Goal: Patient/Family participate in treatment and DC plans  Description: Interventions:- Provide therapeutic environment  Outcome: Progressing

## 2025-03-27 PROBLEM — Z00.8 MEDICAL CLEARANCE FOR PSYCHIATRIC ADMISSION: Status: RESOLVED | Noted: 2024-01-26 | Resolved: 2025-03-27

## 2025-03-27 PROBLEM — N17.9 AKI (ACUTE KIDNEY INJURY) (HCC): Status: RESOLVED | Noted: 2024-11-01 | Resolved: 2025-03-27

## 2025-03-27 LAB
ANION GAP SERPL CALCULATED.3IONS-SCNC: 8 MMOL/L (ref 4–13)
BUN SERPL-MCNC: 16 MG/DL (ref 5–25)
CALCIUM SERPL-MCNC: 8.5 MG/DL (ref 8.4–10.2)
CHLORIDE SERPL-SCNC: 112 MMOL/L (ref 96–108)
CO2 SERPL-SCNC: 21 MMOL/L (ref 21–32)
CREAT SERPL-MCNC: 1.43 MG/DL (ref 0.6–1.3)
GFR SERPL CREATININE-BSD FRML MDRD: 35 ML/MIN/1.73SQ M
GLUCOSE P FAST SERPL-MCNC: 87 MG/DL (ref 65–99)
GLUCOSE SERPL-MCNC: 155 MG/DL (ref 65–140)
GLUCOSE SERPL-MCNC: 172 MG/DL (ref 65–140)
GLUCOSE SERPL-MCNC: 182 MG/DL (ref 65–140)
GLUCOSE SERPL-MCNC: 87 MG/DL (ref 65–140)
GLUCOSE SERPL-MCNC: 93 MG/DL (ref 65–140)
MAGNESIUM SERPL-MCNC: 1.4 MG/DL (ref 1.9–2.7)
POTASSIUM SERPL-SCNC: 3.8 MMOL/L (ref 3.5–5.3)
SODIUM SERPL-SCNC: 141 MMOL/L (ref 135–147)

## 2025-03-27 PROCEDURE — 99232 SBSQ HOSP IP/OBS MODERATE 35: CPT | Performed by: STUDENT IN AN ORGANIZED HEALTH CARE EDUCATION/TRAINING PROGRAM

## 2025-03-27 PROCEDURE — 80048 BASIC METABOLIC PNL TOTAL CA: CPT | Performed by: NURSE PRACTITIONER

## 2025-03-27 PROCEDURE — 83735 ASSAY OF MAGNESIUM: CPT | Performed by: NURSE PRACTITIONER

## 2025-03-27 PROCEDURE — 82948 REAGENT STRIP/BLOOD GLUCOSE: CPT

## 2025-03-27 RX ORDER — ASPIRIN 81 MG/1
81 TABLET, CHEWABLE ORAL DAILY
Qty: 30 TABLET | Refills: 1 | Status: SHIPPED | OUTPATIENT
Start: 2025-03-27

## 2025-03-27 RX ORDER — VENLAFAXINE HYDROCHLORIDE 75 MG/1
75 CAPSULE, EXTENDED RELEASE ORAL DAILY
Qty: 30 CAPSULE | Refills: 1 | Status: SHIPPED | OUTPATIENT
Start: 2025-03-28 | End: 2025-05-27

## 2025-03-27 RX ORDER — ISOSORBIDE MONONITRATE 30 MG/1
30 TABLET, EXTENDED RELEASE ORAL DAILY
Qty: 30 TABLET | Refills: 1 | Status: SHIPPED | OUTPATIENT
Start: 2025-03-28 | End: 2025-05-27

## 2025-03-27 RX ORDER — DILTIAZEM HYDROCHLORIDE 120 MG/1
120 CAPSULE, COATED, EXTENDED RELEASE ORAL DAILY
Qty: 30 CAPSULE | Refills: 1 | Status: SHIPPED | OUTPATIENT
Start: 2025-03-28 | End: 2025-05-27

## 2025-03-27 RX ORDER — TRAZODONE HYDROCHLORIDE 50 MG/1
50 TABLET ORAL
Qty: 30 TABLET | Refills: 1 | Status: SHIPPED | OUTPATIENT
Start: 2025-03-27 | End: 2025-05-26

## 2025-03-27 RX ORDER — LANOLIN ALCOHOL/MO/W.PET/CERES
800 CREAM (GRAM) TOPICAL 2 TIMES DAILY
Qty: 120 TABLET | Refills: 1 | Status: SHIPPED | OUTPATIENT
Start: 2025-03-27 | End: 2025-05-26

## 2025-03-27 RX ORDER — CHOLECALCIFEROL (VITAMIN D3) 25 MCG
2000 TABLET ORAL DAILY
Qty: 60 TABLET | Refills: 1 | Status: SHIPPED | OUTPATIENT
Start: 2025-03-27

## 2025-03-27 RX ORDER — PANTOPRAZOLE SODIUM 40 MG/1
40 TABLET, DELAYED RELEASE ORAL DAILY
Qty: 30 TABLET | Refills: 1 | Status: SHIPPED | OUTPATIENT
Start: 2025-03-27 | End: 2025-05-26

## 2025-03-27 RX ORDER — INSULIN GLARGINE 100 [IU]/ML
7 INJECTION, SOLUTION SUBCUTANEOUS
Qty: 10 ML | Refills: 0 | Status: SHIPPED | OUTPATIENT
Start: 2025-03-27

## 2025-03-27 RX ORDER — LEVOTHYROXINE SODIUM 75 UG/1
75 TABLET ORAL DAILY
Qty: 30 TABLET | Refills: 1 | Status: SHIPPED | OUTPATIENT
Start: 2025-03-27 | End: 2025-05-26

## 2025-03-27 RX ORDER — PRAVASTATIN SODIUM 40 MG
40 TABLET ORAL
Qty: 30 TABLET | Refills: 1 | Status: SHIPPED | OUTPATIENT
Start: 2025-03-27 | End: 2025-05-26

## 2025-03-27 RX ORDER — LANOLIN ALCOHOL/MO/W.PET/CERES
800 CREAM (GRAM) TOPICAL ONCE
Status: DISCONTINUED | OUTPATIENT
Start: 2025-03-27 | End: 2025-03-27

## 2025-03-27 RX ORDER — LANOLIN ALCOHOL/MO/W.PET/CERES
800 CREAM (GRAM) TOPICAL
Status: COMPLETED | OUTPATIENT
Start: 2025-03-27 | End: 2025-03-27

## 2025-03-27 RX ADMIN — Medication 3 MG: at 21:04

## 2025-03-27 RX ADMIN — NYSTATIN: 100000 POWDER TOPICAL at 17:19

## 2025-03-27 RX ADMIN — INSULIN LISPRO 1 UNITS: 100 INJECTION, SOLUTION INTRAVENOUS; SUBCUTANEOUS at 12:15

## 2025-03-27 RX ADMIN — ISOSORBIDE MONONITRATE 30 MG: 30 TABLET, EXTENDED RELEASE ORAL at 09:06

## 2025-03-27 RX ADMIN — Medication 800 MG: at 17:18

## 2025-03-27 RX ADMIN — INSULIN GLARGINE 7 UNITS: 100 INJECTION, SOLUTION SUBCUTANEOUS at 21:29

## 2025-03-27 RX ADMIN — VENLAFAXINE HYDROCHLORIDE 75 MG: 75 CAPSULE, EXTENDED RELEASE ORAL at 09:06

## 2025-03-27 RX ADMIN — ASPIRIN 81 MG CHEWABLE TABLET 81 MG: 81 TABLET CHEWABLE at 09:06

## 2025-03-27 RX ADMIN — Medication 800 MG: at 09:14

## 2025-03-27 RX ADMIN — CYANOCOBALAMIN TAB 1000 MCG 1000 MCG: 1000 TAB at 09:06

## 2025-03-27 RX ADMIN — LEVOTHYROXINE SODIUM 75 MCG: 0.07 TABLET ORAL at 09:06

## 2025-03-27 RX ADMIN — NYSTATIN: 100000 POWDER TOPICAL at 09:06

## 2025-03-27 RX ADMIN — INSULIN LISPRO 1 UNITS: 100 INJECTION, SOLUTION INTRAVENOUS; SUBCUTANEOUS at 21:30

## 2025-03-27 RX ADMIN — Medication 2000 UNITS: at 09:06

## 2025-03-27 RX ADMIN — PANTOPRAZOLE SODIUM 40 MG: 40 TABLET, DELAYED RELEASE ORAL at 09:06

## 2025-03-27 RX ADMIN — DILTIAZEM HYDROCHLORIDE 120 MG: 120 CAPSULE, COATED, EXTENDED RELEASE ORAL at 09:06

## 2025-03-27 RX ADMIN — INSULIN LISPRO 1 UNITS: 100 INJECTION, SOLUTION INTRAVENOUS; SUBCUTANEOUS at 17:18

## 2025-03-27 RX ADMIN — TRAZODONE HYDROCHLORIDE 50 MG: 50 TABLET ORAL at 21:04

## 2025-03-27 RX ADMIN — PRAVASTATIN SODIUM 40 MG: 40 TABLET ORAL at 17:18

## 2025-03-27 RX ADMIN — Medication 800 MG: at 12:12

## 2025-03-27 NOTE — BH TRANSITION RECORD
Contact Information: If you have any questions, concerns, pended studies, tests and/or procedures, or emergencies regarding your inpatient behavioral health visit. Please contact [facility] [phone] and ask to speak to a , nurse or physician. A contact is available 24 hours/ 7 days a week at this number.     Summary of Procedures Performed During your Stay:  Below is a list of major procedures performed during your hospital stay and a summary of results:  - Cardiac Procedures/Studies: ECG- Normal Sinus Rhythm .    Pending Studies (From admission, onward)       Start     Ordered    03/28/25 0600  Magnesium  Morning draw         03/27/25 0809    03/28/25 0600  Basic metabolic panel  Morning draw         03/27/25 0809                  Please follow up on the above pending studies with your PCP and/or referring provider.

## 2025-03-27 NOTE — PHYSICAL THERAPY NOTE
Physical Therapy Evaluation    Patient's Name: Yajaira Marsh    Admitting Diagnosis  Major depressive disorder, single episode, unspecified [F32.9]  Mood disorder (HCC) [F39]    Problem List  Patient Active Problem List   Diagnosis    Severe episode of recurrent major depressive disorder, without psychotic features ([facility])    History of DVT (deep vein thrombosis)    CAD (coronary artery disease)    Hyperlipidemia    Hypothyroidism    Spinal stenosis of lumbar region    Adenomatous polyp of colon    Vitreo-retinal adhesions    Vitamin D deficiency    Vitamin B12 deficiency    Onychomycosis    Diabetic polyneuropathy associated with type 2 diabetes mellitus ([facility])    SARAY (obstructive sleep apnea)    Allergic rhinitis    MADAN (generalized anxiety disorder)    Herpes zoster without complication    Apraxia    Microalbuminuria    Lumbar radiculopathy    Psoriasis with arthropathy ([facility])    S/P CABG (coronary artery bypass graft)    Lung nodule    Osteoarthritis of spine with radiculopathy, lumbar region    Anxiety    Kidney mass    Fall    Kidney stone    Obesity (BMI 30-39.9)    Medical clearance for psychiatric admission    Type 2 diabetes mellitus with hyperglycemia, with long-term current use of insulin ([facility])    Diarrhea    KATIE (acute kidney injury) ([facility])    Stage 3b chronic kidney disease ([facility])    Current mild episode of major depressive disorder without prior episode ([facility])    Essential hypertension    Ambulatory dysfunction    Hypoglycemia    Hypokalemia    Atherosclerosis of both carotid arteries    Chronic mastoiditis    Renal insufficiency    Type 2 diabetes mellitus, with long-term current use of insulin ([facility])    Depressive disorder    MCI (mild cognitive impairment)       Past Medical History  Past Medical History:   Diagnosis Date    Acute embolism and thrombosis of unspecified deep veins of unspecified lower extremity ([facility])     Last Assessed:  5/18/17    Anemia     Anosmia     Anxiety     Arthritis     Asthma      Back pain     Bilateral macular retinal edema     CAD (coronary artery disease)     Cataract     Cervical disc herniation     Cervical radiculopathy     Cervical spinal stenosis     Cervical spondylolysis     Chronic kidney disease     Chronic mastoiditis     Colon polyp     Complex endometrial hyperplasia     Depression     Diabetes mellitus (HCC)     Disease of thyroid gland     DVT (deep venous thrombosis) (HCC)     DVT (deep venous thrombosis) ([facility]) 06/16/2017    Fibromyalgia     Fibromyalgia, primary     Hyperlipidemia     Hypertension     Hypothyroid     Kidney stone     Lumbar radiculopathy     Neck pain     Obese     PONV (postoperative nausea and vomiting)     Shingles 07/01/2021    Spinal stenosis     Stomach ulcer     Thyroid disease     Toxic metabolic encephalopathy 02/11/2025    Vascular claudication ([facility]) 12/11/2017       Past Surgical History  Past Surgical History:   Procedure Laterality Date    BACK SURGERY      CARPAL TUNNEL RELEASE      CATARACT EXTRACTION      CHOLECYSTECTOMY      COLONOSCOPY      CORONARY ANGIOPLASTY      CORONARY ARTERY BYPASS GRAFT      CYSTOSCOPY N/A 6/20/2017    Procedure: CYSTOSCOPY;  Surgeon: Tacho Arnold MD;  [facility]: [facility];  Service: Gynecology Oncology    CYSTOSCOPY      FL LAPS TOTAL HYSTERECT 250 GM/< W/RMVL TUBE/OVARY N/A 6/20/2017    Procedure: ROBOTIC HYSTERECTOMY; BILATERAL SALPINO-OOPHERECTOMY; umbilical hernia repair.;  Surgeon: Tacho Arnold MD;  Location[facility];  Service: Gynecology Oncology    FL REPAIR FIRST ABDOMINAL WALL HERNIA N/A 5/12/2022    Procedure: REPAIR HERNIA INCISIONAL;  Surgeon: Horacio Scherer DO;  Location: [facility];  Service: General    TONSILECTOMY AND ADNOIDECTOMY      TONSILLECTOMY      UMBILICAL HERNIA REPAIR         Recent Imaging  CT head wo contrast   Final Result by Bob Arthur MD (03/25 1306)      No acute intracranial abnormality.                  Workstation performed: [id number]              Recent Vital Signs  Vitals:    03/26/25 0755 03/26/25 1536 03/26/25 2042 03/27/25 0751   BP: 129/63 130/60 143/65 158/86   BP Location: Left arm  Left arm Right arm   Pulse: 85 72 70 68   Resp: 18 17 17 17   Temp: (!) 97.1 °F (36.2 °C) 98 °F (36.7 °C) 97.6 °F (36.4 °C)    TempSrc: Temporal Temporal Temporal    SpO2: 95% 95% 96% 98%   Weight:       Height:            03/26/25 1100   PT Last Visit   PT Visit Date 03/26/25   Note Type   Note type Evaluation   Pain Assessment   Pain Assessment Tool 0-10   Pain Score 4   Pain Location/Orientation Orientation: Right;Location: Shoulder   Restrictions/Precautions   Weight Bearing Precautions Per Order No   Other Precautions Pain   Home Living   Type of Home House   Home Layout One level;Performs ADLs on one level   Bathroom Toilet Standard   Bathroom Accessibility Accessible via walker;Accessible   Prior Function   Level of [place] Independent with ADLs;Independent with functional mobility   Lives With Spouse   Receives Help From Family   Falls in the last 6 months 1 to 4   General   Family/Caregiver Present No   Cognition   Overall Cognitive Status WFL   Arousal/Participation Alert   Orientation Level Oriented X4   Memory Within functional limits   Following Commands Follows all commands and directions without difficulty   RUE Assessment   RUE Assessment X   RUE Overall PROM   R Shoulder Flexion to 95* then painful   R Shoulder ABduction to 95* then painful   RUE Strength   R Shoulder Flexion 3-/5   R Shoulder ABduction 3-/5   R Shoulder Internal Rotation 3-/5   R Shoulder External Rotation 3-/5   RLE Assessment   RLE Assessment WFL  (4/5)   LLE Assessment   LLE Assessment WFL  (4/5)   Coordination   Movements are Fluid and Coordinated 0   Coordination and Movement Description antlagic shoulder movement RUE   Sensation X   Light Touch   RLE Light Touch Impaired   LLE Light Touch Impaired   Bed Mobility   Supine to Sit 6  Modified independent   Additional items  Increased time required   Sit to Supine 6  Modified independent   Additional items Increased time required   Transfers   Sit to Stand 6  Modified independent   Additional items Increased time required   Stand to Sit 6  Modified independent   Additional items Increased time required   Ambulation/Elevation   Gait pattern Short stride;Decreased foot clearance;Step through pattern   Gait Assistance 6  Modified independent   Additional items Verbal cues   Assistive Device Rolling walker   Distance 200ft, 250ft   Balance   Static Sitting Fair +   Dynamic Sitting Fair +   Static Standing Fair   Dynamic Standing Fair -   Ambulatory Fair -   Endurance Deficit   Endurance Deficit Yes   Endurance Deficit Description seated rest break needed after longer ambulation, MORSE   Activity Tolerance   Activity Tolerance Patient tolerated treatment well   Medical Staff Made Aware spoke to CM   Nurse Made Aware spoke to RN   Assessment   Prognosis Good   Problem List Decreased strength;Decreased range of motion;Decreased endurance;Impaired balance;Decreased mobility;Decreased coordination;Pain;Impaired sensation   Goals   Patient Goals to get home and then get R shoulder checked out   Discharge Recommendation   Rehab Resource Intensity Level, PT III (Minimum Resource Intensity)   AM-PAC Basic Mobility Inpatient   Turning in Flat Bed Without Bedrails 4   Lying on Back to Sitting on Edge of Flat Bed Without Bedrails 4   Moving Bed to Chair 4   Standing Up From Chair Using Arms 4   Walk in Room 4   Climb 3-5 Stairs With Railing 4   Basic Mobility Inpatient Raw Score 24   Basic Mobility Standardized Score 57.68   [facility] Highest Level Of Mobility   -HLM Goal 8: Walk 250 feet or more   -HLM Achieved 8: Walk 250 feet ot more   End of Consult   Patient Position at End of Consult Bedside chair;All needs within reach         ASSESSMENT                                                                                                                      Yajaira Marsh is a 79 y.o. female admitted to [facility] on 3/24/2025 for Severe episode of recurrent major depressive disorder, without psychotic features ([facility]). Pt  has a past medical history of Acute embolism and thrombosis of unspecified deep veins of unspecified lower extremity ([facility]), Anemia, Anosmia, Anxiety, Arthritis, Asthma, Back pain, Bilateral macular retinal edema, CAD (coronary artery disease), Cataract, Cervical disc herniation, Cervical radiculopathy, Cervical spinal stenosis, Cervical spondylolysis, Chronic kidney disease, Chronic mastoiditis, Colon polyp, Complex endometrial hyperplasia, Depression, Diabetes mellitus ([facility]), Disease of thyroid gland, DVT (deep venous thrombosis) ([facility]), DVT (deep venous thrombosis) ([facility]) (06/16/2017), Fibromyalgia, Fibromyalgia, primary, Hyperlipidemia, Hypertension, Hypothyroid, Kidney stone, Lumbar radiculopathy, Neck pain, Obese, PONV (postoperative nausea and vomiting), Shingles (07/01/2021), Spinal stenosis, Stomach ulcer, Thyroid disease, Toxic metabolic encephalopathy (02/11/2025), and Vascular claudication ([facility]) (12/11/2017).. PT was consulted and pt was seen on 3/27/2025 for mobility assessment and d/c planning.   Impairments limiting pt at this time include decreased ROM, impaired balance, decreased endurance, decreased coordination, pain, decreased sensation, and decreased strength. Pt is currently functioning at a modified independent assistance level for bed mobility, modified independent assistance level for transfers, modified independent assistance level for ambulation with Rolling Walker. The patient's -[facility] Basic Mobility Inpatient Short Form Raw Score is 24. A Raw score of greater than 16 suggests the patient may benefit from discharge to home. Please also refer to the recommendation of the Physical Therapist for safe discharge planning.    Recommendations                                                                                                                 DME: Rolling Walker    Discharge Disposition:  Home with Outpatient Physical Therapy       Alex Mcmullen PT, DPT

## 2025-03-27 NOTE — ASSESSMENT & PLAN NOTE
- h/o MDD and MADAN w/ panic attacks, previously in outpatient treatment by Dr. Miller (2020 until 2022), currently on Effexor 37.5 mg daily (recently started by PCP) and Xanax 0.5 mg BID presented with acute exacerbation of depression and anxiety in the context of social isolation and psychosocial stressors over past two years since moved to the [facility].  - Increased Effexor to 75 mg po daily on 3/26/25; doses to be adjusted slowly based on CrCl.  - Xanax tapered off successfully due to concerns for MCI and fall risk  - Melatonin 3 mg nightly  - Trazodone 50 mg nightly for insomnia  - Group and milieu therapy

## 2025-03-27 NOTE — PROGRESS NOTES
03/27/25 0800   Team Meeting   Meeting Type Daily Rounds   Team Members Present   Team Members Present Physician;Nurse;;   Physician Team Member Dr. Werner / Dr. Reyes / Dr. Rothman / JORGE Reid   Nursing Team Member Jayy/Colton   Care Management Team Member Monica   Social Work Team Member Joseph   Patient/Family Present   Patient Present No   Patient's Family Present No     She is eating well and attending groups. She continues to be anxious about returning home and things being the same. Possible discharge for Friday. She is sleeping well. Endorses anxiety. Patient discharge is pending stabilization of mood and medications.

## 2025-03-27 NOTE — TREATMENT TEAM
03/27/25 0800   Team Meeting   Meeting Type Daily Rounds   Team Members Present   Team Members Present Physician;Nurse;;   Physician Team Member Dr. Werner / Dr. Reyes / Dr. Rothman / JORGE Reid   Nursing Team Member Jayy/Colton   Care Management Team Member Monica   Social Work Team Member Joseph   Patient/Family Present   Patient Present No   Patient's Family Present No

## 2025-03-27 NOTE — ASSESSMENT & PLAN NOTE
Lab Results   Component Value Date    EGFR 35 03/27/2025    EGFR 26 03/26/2025    EGFR 20 03/25/2025    CREATININE 1.43 (H) 03/27/2025    CREATININE 1.78 (H) 03/26/2025    CREATININE 2.22 (H) 03/25/2025   - f/u SLIM recs

## 2025-03-27 NOTE — PROGRESS NOTES
Progress Note - Behavioral Health   Name: Yajaira Marsh 79 y.o. female I MRN: [id number]  Unit/Bed#: [facility] 648-02 I Date of Admission: 3/24/2025   Date of Service: 3/27/2025 I Hospital Day: 3     Assessment & Plan  Severe episode of recurrent major depressive disorder, without psychotic features (HCC)  - h/o MDD and MADAN w/ panic attacks, previously in outpatient treatment by Dr. Miller (2020 until 2022), currently on Effexor 37.5 mg daily (recently started by PCP) and Xanax 0.5 mg BID presented with acute exacerbation of depression and anxiety in the context of social isolation and psychosocial stressors over past two years since moved to the [facility].  - Increased Effexor to 75 mg po daily on 3/26/25; doses to be adjusted slowly based on CrCl.  - Xanax tapered off successfully due to concerns for MCI and fall risk  - Melatonin 3 mg nightly  - Trazodone 50 mg nightly for insomnia  - Group and milieu therapy  MADAN (generalized anxiety disorder)  - Management as per principal problem    MCI (mild cognitive impairment)  - SLUMS: 24/30 on 3/25/25  - Fall and delirium precaution  - PT eval  CAD (coronary artery disease)  - f/u SLIM recs  Hyperlipidemia  - f/u SLIM recs  Hypothyroidism  - f/u SLIM recs  SARAY (obstructive sleep apnea)  - outpatient f/u w/ sleep medicine recommended  Type 2 diabetes mellitus with hyperglycemia, with long-term current use of insulin (HCC)  Lab Results   Component Value Date    HGBA1C 11.1 (H) 03/07/2025       Recent Labs     03/26/25  1622 03/26/25  2111 03/27/25  0708 03/27/25  1138   POCGLU 217* 153* 93 155*   - f/u SLIM recs    Blood Sugar Average: Last 72 hrs:  (P) [id number]    Stage 3b chronic kidney disease (HCC)  Lab Results   Component Value Date    EGFR 35 03/27/2025    EGFR 26 03/26/2025    EGFR 20 03/25/2025    CREATININE 1.43 (H) 03/27/2025    CREATININE 1.78 (H) 03/26/2025    CREATININE 2.22 (H) 03/25/2025   - f/u SLIM recs  Essential hypertension  - f/u SLIM  recs  Hypokalemia  - f/u SLIM recs  Fall        Current medications:  Current Facility-Administered Medications   Medication Dose Route Frequency Provider Last Rate    acetaminophen  650 mg Oral Q4H PRN Juanita Floydtalha, CRNP      acetaminophen  650 mg Oral Q4H PRN Juanita Floydtalha, CRNP      acetaminophen  975 mg Oral Q6H PRN Juanita Ruizsuzi, CRNP      aluminum-magnesium hydroxide-simethicone  30 mL Oral Q4H PRN Juanita Ruizsuzi, CRNP      aspirin  81 mg Oral Daily Juanita Ruizsuzi, CRNP      benztropine  1 mg Intramuscular Q4H PRN Max 6/day Juanita Ruizsuzi, CRNP      benztropine  0.5 mg Oral Q4H PRN Max 6/day Juanita Ruizsuzi, CRNP      cholecalciferol  2,000 Units Oral Daily Juanita Ruizsuzi, CRNP      cyanocobalamin  1,000 mcg Oral Daily Juanita Ruizsuzi, CRNP      diltiazem  120 mg Oral Daily Juanita Nevarez Charity, CRNP      hydrOXYzine HCL  25 mg Oral Q6H PRN Max 4/day Juanita Ruizsuzi, CRNP      hydrOXYzine HCL  50 mg Oral Q6H PRN Max 4/day Juanita Nevarez Charity, CRNP      insulin glargine  7 Units Subcutaneous HS Juanita Ruizsuzi, CRNP      insulin lispro  1-6 Units Subcutaneous TID AC Juanita Ruizsuzi, CRNP      insulin lispro  1-6 Units Subcutaneous HS Juanita Nevarez Charity, CRNP      isosorbide mononitrate  30 mg Oral Daily Juanita Ruizsuzi, CRNP      levothyroxine  75 mcg Oral Daily Juanita Ruizsuzi, CRNP      LORazepam  1 mg Intramuscular Q6H PRN Max 3/day Juanita Umañabhumi Nash, CRNP      LORazepam  1 mg Oral Q6H PRN Max 3/day Juanita Umañabhumi Nash, CRNP      magnesium Oxide  800 mg Oral BID Juanita Umañabhumi Nash, CRNP      magnesium Oxide  800 mg Oral Daily With Lunch Jadyn Shaw PA-C      melatonin  3 mg Oral HS Fede Werner MD      mirtazapine  7.5 mg Oral HS PRN Fede Werner MD      nystatin   Topical BID ALAN Perez      OLANZapine  5 mg Intramuscular Q3H PRN Max 3/day Juanita Nevarez  Sarasuzi, CRNP      OLANZapine  2.5 mg Oral Q4H PRN Max 6/day Juanita Nash, CRNP      OLANZapine  5 mg Oral Q4H PRN Max 3/day Juanita Nash, CRNP      OLANZapine  5 mg Oral Q3H PRN Max 3/day Juanita Nash, CRNP      pantoprazole  40 mg Oral Daily Juanita Nash, CRNP      polyethylene glycol  17 g Oral Daily PRN Juanita Nash, CRNP      pravastatin  40 mg Oral Daily With Dinner Juanita Nash, CRNP      propranolol  5 mg Oral Q8H PRN Juanita Nash, CRNP      traZODone  50 mg Oral HS Fede Werner MD      venlafaxine  75 mg Oral Daily Fede Werner MD          Risks/Benefits of Treatment:   Risks, benefits, and possible side effects of medications explained to patient and patient verbalizes understanding and agreement for treatment.    Progress Toward Goals: improving    Treatment Planning:    - Encourage early mobility and having a structured day  - Provide frequent re-orientation, and cognitive stimulation  - Ensure assistive devices are in proper working order (eye-glasses, hearing aids)  - Encourage adequate hydration, nutrition and monitor bowel movements  - Maintain sleep-wake cycle: Uninterrupted sleep time; low-level lighting at night  - Fall precaution  - f/u SLIM recs regarding the medical problems   - f/u PT/OT eval/recs  - Continue medication titration and treatment plan; adjust medication to optimize treatment response and as clinically indicated. .   - Observation: routine            - VS: as per unit protocol  - Legal status: 201  - Encourage group attendance and milieu therapy  - Dispo: To be determined   -   - Estimated Discharge Day: 3/28/2025     Subjective     Patient was visited on unit for continuing care; chart reviewed and discussed with multidisciplinary treatment team.  On approach, the patient was calm and cooperative. Endorsed better mood and less anxiety sxs. Remains preoccupied with psychosocial stressors upon returning  home. Denied any changes in appetite, and energy level. No problem initiating and maintaining sleep.  Denied A/VH currently.  Denied SI/HI, intent or plan upon direct inquiry at this time. The patient consented for safety and agreed to verbalize any frustration or concerns to the nursing staff, immediately.    Patient continues to be visible in the milieu and interacts with staff and peers. No reports of aggression or self-injurious behavior on unit. No PRN medications used in the past 24 hours.    Patient accepted all offered medications and no adverse effects of medications noted or reported. Effexor uptitrated to 75 mg daily on 3/26.  provided community resources (intake at [facility] on Monday). Tentative discharge tomorrow.    Sleep: improved  Appetite: improved  Medication side effects: No  ROS: review of systems as noted above in HPI/Subjective report, all other systems are negative    Objective :  Temp:  [97.6 °F (36.4 °C)-98 °F (36.7 °C)] 97.6 °F (36.4 °C)  HR:  [68-72] 68  BP: (130-158)/(60-86) 158/86  Resp:  [17] 17  SpO2:  [95 %-98 %] 98 %  O2 Device: None (Room air)      Mental Status Evaluation:  Appearance and attitude: appeared as stated age, dressed in hospital attire, with good hygiene  Eye contact: good  Motor Function: within normal limits, No PMA/PMR  Gait/station:  needs assistive device  Speech: normal for rate, rhythm, volume, and latency with decreased amount  Language: No overt abnormality  Mood/affect: less anxious, less depressed / Affect was constricted but reactive, mood congruent  Thought Processes: linear, negative thinking  Thought content: denied suicidal ideations or homicidal ideations, no overt delusions elicited, negative thoughts, ruminations  Associations: concrete associations  Perceptual disturbances: denies Auditory/Visual/Tactile Hallucinations  Orientation: oriented to time, person, place and to the situational context  Cognitive Function:  intact  Attention/Concentration: attention span and concentration are age appropriate  Memory: recent and remote memory grossly intact  Fund of knowledge: aware of current events, aware of past history, and vocabulary average  Impulse control: good  Insight/judgment: fair/fair            Lab Results: I have reviewed the following results:  Results from the past 24 hours:   Recent Results (from the past 24 hours)   Fingerstick Glucose (POCT)    Collection Time: 03/26/25  4:22 PM   Result Value Ref Range    POC Glucose 217 (H) 65 - 140 mg/dl   Fingerstick Glucose (POCT)    Collection Time: 03/26/25  9:11 PM   Result Value Ref Range    POC Glucose 153 (H) 65 - 140 mg/dl   Basic metabolic panel    Collection Time: 03/27/25  4:44 AM   Result Value Ref Range    Sodium 141 135 - 147 mmol/L    Potassium 3.8 3.5 - 5.3 mmol/L    Chloride 112 (H) 96 - 108 mmol/L    CO2 21 21 - 32 mmol/L    ANION GAP 8 4 - 13 mmol/L    BUN 16 5 - 25 mg/dL    Creatinine 1.43 (H) 0.60 - 1.30 mg/dL    Glucose 87 65 - 140 mg/dL    Glucose, Fasting 87 65 - 99 mg/dL    Calcium 8.5 8.4 - 10.2 mg/dL    eGFR 35 ml/min/1.73sq m   Magnesium    Collection Time: 03/27/25  4:44 AM   Result Value Ref Range    Magnesium 1.4 (L) 1.9 - 2.7 mg/dL   Fingerstick Glucose (POCT)    Collection Time: 03/27/25  7:08 AM   Result Value Ref Range    POC Glucose 93 65 - 140 mg/dl   Fingerstick Glucose (POCT)    Collection Time: 03/27/25 11:38 AM   Result Value Ref Range    POC Glucose 155 (H) 65 - 140 mg/dl       Administrative Statements     Counseling / Coordination of Care:   Patient's progress discussed with staff in treatment team meeting.  Medications, treatment progress and treatment plan reviewed with patient.  Medication education provided to patient.  Supportive therapy provided to patient.  Cognitive techniques utilized during the session.  Reassurance and supportive therapy provided.  Reoriented to reality and reassured.  Encouraged participation in milieu and  group therapy on the unit.  Crisis/safety plan discussed with patient.  Discharge plan discussed with patient.       Fede Werner MD  Attending Psychiatrist   [facility]    03/27/25     This note was completed in part utilizing Dragon dictation Software. Grammatical, translation, syntax errors, random word insertions, spelling mistakes, and incomplete sentences may be an occasional consequence of this system secondary to software limitations with voice recognition, ambient noise, and hardware issues. If you have any questions or concerns about the content, text, or information contained within the body of this dictation, please contact the provider for clarification.

## 2025-03-27 NOTE — PLAN OF CARE
Pt attended group and able to self reflect.     Problem: Individualized Interventions  Goal: Attend and participate in unit activities, including therapeutic, recreational, and educational groups  Outcome: Progressing

## 2025-03-27 NOTE — NURSING NOTE
Yajaira is pleasant and cooperative. Patient declined prn laxatives. Sophie endorses anxiety but reports it is better. Sophie is medication compliant. Visible in the milieu. Interacts with select peers and staff. Will continue to monitor and support.

## 2025-03-27 NOTE — PROGRESS NOTES
Pt attended groups.  Pt able to self express and cooperative  Pt making slow and steady progress towards mh recovery goals.  Pt hopeful of d/c.     03/27/25 1000   Activity/Group Checklist   Group Other (Comment)  (Community meeting: Depression vs Bipolar)   Attendance Attended   Attendance Duration (min) 46-60   Interactions Interacted appropriately   Affect/Mood Appropriate   Goals Achieved Identified feelings;Identified triggers;Identified relapse prevention strategies;Discussed coping strategies;Discussed self-esteem issues;Able to listen to others;Able to engage in interactions

## 2025-03-27 NOTE — ASSESSMENT & PLAN NOTE
Lab Results   Component Value Date    HGBA1C 11.1 (H) 03/07/2025       Recent Labs     03/26/25  1622 03/26/25  2111 03/27/25  0708 03/27/25  1138   POCGLU 217* 153* 93 155*   - f/u SLIM recs    Blood Sugar Average: Last 72 hrs:  (P) 175.7021769611509203

## 2025-03-27 NOTE — SOCIAL WORK
This writer spoke with patient's daughter and dicussed discharge tomorrow. Daughter will transport patient at 1000. Daughter reports she will temporarily move in with her parents to provide support. This writer encouraged daughter to contact [facility] to begin the process of applying for Home Health Aide services.   This writer met with patient and discussed her discharge tomorrow. This writer discussed [facility] which patient is pleased to be referred to and look forward to completing the tour.

## 2025-03-27 NOTE — TREATMENT TEAM
Pt completed admission Bh relapse prevention.  Pt signed and copy in chart.  Crisis, wamline and 988 numbers provided.   Pt aware of d/c.  Pharmacy  [facility].  Provider Dr Clemens.  Pt attends groups.      03/27/25 1045   Activity/Group Checklist   Group Admission/Discharge   Attendance Attended   Attendance Duration (min) 16-30   Interactions Interacted appropriately   Affect/Mood Appropriate   Goals Achieved Identified feelings;Identified triggers;Identified relapse prevention strategies;Discussed coping strategies;Able to listen to others;Able to engage in interactions;Able to reflect/comment on own behavior;Able to manage/cope with feelings;Verbalized increased hopefulness

## 2025-03-27 NOTE — QUICK NOTE
Labs reviewed.  Magnesium remains low.  Patient is on a high dose supplement.  Will provide an extra dose of magnesium oxide now.  Follow creatinine.

## 2025-03-28 VITALS
DIASTOLIC BLOOD PRESSURE: 71 MMHG | SYSTOLIC BLOOD PRESSURE: 168 MMHG | WEIGHT: 170.4 LBS | RESPIRATION RATE: 16 BRPM | BODY MASS INDEX: 32.17 KG/M2 | HEART RATE: 70 BPM | HEIGHT: 61 IN | TEMPERATURE: 97.8 F | OXYGEN SATURATION: 98 %

## 2025-03-28 LAB — GLUCOSE SERPL-MCNC: 133 MG/DL (ref 65–140)

## 2025-03-28 PROCEDURE — 99238 HOSP IP/OBS DSCHRG MGMT 30/<: CPT | Performed by: STUDENT IN AN ORGANIZED HEALTH CARE EDUCATION/TRAINING PROGRAM

## 2025-03-28 PROCEDURE — 82948 REAGENT STRIP/BLOOD GLUCOSE: CPT

## 2025-03-28 RX ADMIN — ASPIRIN 81 MG CHEWABLE TABLET 81 MG: 81 TABLET CHEWABLE at 08:56

## 2025-03-28 RX ADMIN — ISOSORBIDE MONONITRATE 30 MG: 30 TABLET, EXTENDED RELEASE ORAL at 08:59

## 2025-03-28 RX ADMIN — PANTOPRAZOLE SODIUM 40 MG: 40 TABLET, DELAYED RELEASE ORAL at 08:56

## 2025-03-28 RX ADMIN — LEVOTHYROXINE SODIUM 75 MCG: 0.07 TABLET ORAL at 08:56

## 2025-03-28 RX ADMIN — Medication 2000 UNITS: at 08:55

## 2025-03-28 RX ADMIN — Medication 800 MG: at 08:55

## 2025-03-28 RX ADMIN — VENLAFAXINE HYDROCHLORIDE 75 MG: 75 CAPSULE, EXTENDED RELEASE ORAL at 08:55

## 2025-03-28 RX ADMIN — CYANOCOBALAMIN TAB 1000 MCG 1000 MCG: 1000 TAB at 08:56

## 2025-03-28 RX ADMIN — DILTIAZEM HYDROCHLORIDE 120 MG: 120 CAPSULE, COATED, EXTENDED RELEASE ORAL at 08:59

## 2025-03-28 NOTE — PROGRESS NOTES
03/28/25 0700   Team Meeting   Meeting Type Daily Rounds   Team Members Present   Team Members Present Physician;Nurse;   Physician Team Member Debbi/Lauryn/Amy/Lorna   Nursing Team Member Jayy/Colton   Care Management Team Member Balwinder PAGE   Social Work Team Member Joseph   Patient/Family Present   Patient Present No   Patient's Family Present No     Patient is for discharge today. She may be able to attend [facility] program. She is going to see Dr. Randhawa and will be involved in a senior citizen program. She is confused at times but is pleasant with care. Patient discharge is pending stabilization of mood and medications.

## 2025-03-28 NOTE — NURSING NOTE
Patient discharged from the unit on this day. Denied SI,HI, psychosis and or A/V. No questions verbalized. Patient is in agreement with discharge . Patient provided with after care appointment, discharge instructions and medication regime reviewed . Accompanied to the lobby where patient was met by daughter .All belongings returned.

## 2025-03-28 NOTE — ASSESSMENT & PLAN NOTE
Lab Results   Component Value Date    HGBA1C 11.1 (H) 03/07/2025       Recent Labs     03/27/25  1138 03/27/25  1604 03/27/25  2119 03/28/25  0728   POCGLU 155* 182* 172* 133   - f/u w/ medical providers    Blood Sugar Average: Last 72 hrs:  (P) 166.1000448390308829

## 2025-03-28 NOTE — ASSESSMENT & PLAN NOTE
Lab Results   Component Value Date    EGFR 35 03/27/2025    EGFR 26 03/26/2025    EGFR 20 03/25/2025    CREATININE 1.43 (H) 03/27/2025    CREATININE 1.78 (H) 03/26/2025    CREATININE 2.22 (H) 03/25/2025   - f/u w/ medical providers

## 2025-03-28 NOTE — PLAN OF CARE
Problem: Alteration in Thoughts and Perception  Goal: Verbalize thoughts and feelings  Description: Interventions:- Promote a nonjudgmental and trusting relationship with the patient through active listening and therapeutic communication- Assess patient's level of functioning, behavior and potential for risk- Engage patient in 1 on 1 interactions- Encourage patient to express fears, feelings, frustrations, and discuss symptoms  - [place] patient to reality, help patient recognize reality-based thinking - Administer medications as ordered and assess for potential side effects- Provide the patient education related to the signs and symptoms of the illness and desired effects of prescribed medications  Outcome: Progressing  Goal: Refrain from acting on delusional thinking/internal stimuli  Description: Interventions:- Monitor patient closely, per order - Utilize least restrictive measures - Set reasonable limits, give positive feedback for acceptable - Administer medications as ordered and monitor of potential side effects  Outcome: Progressing     Problem: Ineffective Coping  Goal: Free from restraint events  Description: - Utilize least restrictive measures - Provide behavioral interventions - Redirect inappropriate behaviors   Outcome: Progressing     Problem: Anxiety  Goal: Anxiety is at manageable level  Description: Interventions:- Assess and monitor patient's anxiety level. - Monitor for signs and symptoms (heart palpitations, chest pain, shortness of breath, headaches, nausea, feeling jumpy, restlessness, irritable, apprehensive). - Collaborate with interdisciplinary team and initiate plan and interventions as ordered.- [place] patient to unit/surroundings- Explain treatment plan- Encourage participation in care- Encourage verbalization of concerns/fears- Identify coping mechanisms- Assist in developing anxiety-reducing skills- Administer/offer alternative therapies- Limit or eliminate stimulants  Outcome:  Progressing     Problem: Alteration in Orientation  Goal: Interact with staff daily  Description: Interventions:- Assess and re-assess patient's level of orientation- Engage patient in 1 on 1 interactions, daily, for a minimum of 15 minutes - Establish rapport/trust with patient   Outcome: Progressing  Goal: Cooperate with recommended testing/procedures  Description: Interventions:- Determine need for ancillary testing- Observe for mental status changes- Implement falls/precaution protocol   Outcome: Progressing

## 2025-03-28 NOTE — DISCHARGE SUMMARY
"Discharge Summary - Behavioral Health   Name: Yajaira Marsh 79 y.o. female I MRN: [id number]  Unit/Bed#: [facility] 648-02 I Date of Admission: 3/24/2025   Date of Service: 3/28/2025 I Hospital Day: 4Unit/Bed#: [facility] 648-02 Encounter: [id number]     Admission Date: 3/24/2025    Discharge Date: 03/28/25     Attending Psychiatrist: Fede Werner MD    Reason for Admission/HPI:   History of Present Illness as per Fede Werner MD on 3/25/25:  \"Yajaira Marsh is a 79 y.o.  female, , domiciled w/  in a senior living community,  on SSI (worked as a ) , w/ PMH of multiple medical conditions including CAD, HLD, DM, hypothyroidism, CKD, SARAY, h/o DVT and fall and PPH of MDD and MADAN w/ panic attacks, no prior psychiatric admissions, no prior SA, no h/o self-injurious behavior, previously in outpatient psychiatric care with Dr. Miller at [facility] ([phone]), currently on Effexor XR 37.5 mg daily and Xanax 0.5 mg BID as per PCP who presented to the ED on 3/23/25 with increased anxiety, insomnia and depression. The patient signed 201 and got admitted to the inpatient psychiatry unit 6B for further psychiatric stabilization.       The patient was visited on the unit; chart reviewed. Presented calm, cooperative and well related, dressed in hospital attire, w/ fair hygiene, good eye contact, depressed and anxious mood, constricted affect, talking in normal tone, volume and amount, w/ linear thought process, fair insight and judgement. She reported feeling very isolated since moved to the new [place] in a senior living community about 2 years ago. She noted that her  is 84 y/o, and does not talk too much with her, spending time on his computer, and she does not have close relationship with the neighbors, reportedly \"they are very old\", and feels isolated. She reported feeling depressed with decreased appetite and energy, increased anxiety waqar anticipatory anxiety of insomnia, with initial and " "middle insomnia. Denied hopelessness, worthlessness or feeling guilty. Strongly denied SI/HI, intent or plan upon direct inquiry at this time. Denied A/VH. No manic sxs, paranoid ideations or fixed delusions were elicited.  Denied history of eating disorder or OCD sxs. SLUMS: 24/30 (see media).\"    Hospital Course:   The patient was admitted to the inpatient psychiatric unit and started on every 15 minutes precautions. A treatment plan was formed with focus on pharmacotherapy and milieu therapy, group therapy and individual psychotherapy when indicated. Psychiatric medications were titrated over the hospital stay and milieu therapy was utilized. Effexor increased to 75 mg daily and doses to be adjusted slowly based on CrCl. The patient was started on Trazodone 50 mg nightly for insomnia with good response. Xanax tapered off successfully given the black-box warning of using benzodiazepines in elderly for increased fall risk and worsening of neurocognitive decline. Patient's symptoms improved gradually over the hospital course. At the end of treatment the patient was doing well. Mood was stable at the time of discharge. The patient denied suicidal ideation, intent or plan at the time of discharge and denied homicidal ideation, plan or intent at the time of discharge. There was no overt psychosis at the time of discharge.  There were no signs or symptoms of PTSD or panic attacks. Sleep and appetite were improved. The patient was tolerating medications and was not reporting any significant side effects at the time of discharge.    Patient was discharged on Effexor XR 75 mg daily; Trazodone 50 mg nightly; Melatonin 3 mg nightly - STOP Xanax    The patient manifested improvement in mood and psychotic symptoms during inpatient treatment. The patient has been compliant with treatment plan; no side-effect noted or reported.  Denies any vegetative symptoms with improved self-care. At present, as discussed with the team, the " "patient has maximized treatment at the acute inpatient setting.  The patient can continue treatment at the outpatient setting.  At present,the patient does not pose any acute apparent danger to self or others.  Denied suicidal or homicidal ideation, intent or plan upon direct inquiry at this time; no episode of agitation or self-injurious behavior in the unit.  The patient has been informed of medication regimen and treatment follow-up schedule, reiterated the importance of medication and outpatient follow-up adherence and verbalized understanding of the matter. The patient agrees with current treatment plan.  Family member advised to supervise medication administration and agreed.  The patient will be connected to [facility], outpatient psychiatrist and referred for individual therapy.    Mental Status at time of Discharge:   Appearance and attitude: appeared as stated age, cooperative and attentive, casually dressed, with good hygiene  Eye contact: good  Motor Function: within normal limits, No PMA/PMR  Gait/station:  needs assistive device  Speech: normal for rate, rhythm, volume, and latency with decreased amount  Language: No overt abnormality  Mood/affect: \"better\" / Affect was constricted but reactive, mood congruent  Thought Processes: linear, slowing of thoughts  Thought content: denied suicidal ideations or homicidal ideations, no overt delusions elicited, ruminations, paucity of thought  Associations: concrete associations  Perceptual disturbances: denies Auditory/Visual/Tactile Hallucinations  Orientation: oriented to time, person, place and to the situational context  Cognitive Function: intact  Memory: impaired recall; intact long-term  Intellect: average  Fund of knowledge: aware of current events, aware of past history, and vocabulary average  Impulse control: good  Insight/judgment: fair/fair    Cognitive Asessment: - SLUMS: 24/30 on 3/25/25        Suicide/Homicide Risk Assessment:    Risk of " Harm to Self:   The following ratings are based on assessment at the time of discharge  Demographic risk factors include: , elderly (75 or older)   Historical Risk Factors include: history of depression, history of anxiety  Current Specific Risk Factors include: recent inpatient psychiatric admission - being discharged today, diagnosis of depression, poor reasoning, health problems, social isolation  Protective Factors: no current suicidal ideation, improved mood, improved anxiety symptoms, family support established, being a parent, being , having pets  Weapons/Firearms: none. The following steps have been taken to ensure weapons are properly secured: not applicable  Based on today's assessment, Yajaira presents the following risk of harm to self: low    Risk of Harm to Others:  The following ratings are based on assessment at the time of discharge  Demographic Risk Factors include: none.  Historical Risk Factors include: none.  Current Specific Risk Factors include: none  Protective Factors: no current homicidal ideation  Weapons/Firearms: none. The following steps have been taken to ensure weapons are properly secured: not applicable  Based on today's assessment, Yajaira presents the following risk of harm to others: low    The following interventions are recommended: outpatient follow up with a psychiatrist, outpatient follow up with a therapist, follow up with family physician for medical issues, benefits from [facility] services    Discharge Diagnosis:   Assessment & Plan  Severe episode of recurrent major depressive disorder, without psychotic features (HCC)  - h/o MDD and MADAN w/ panic attacks, previously in outpatient treatment by Dr. Miller (2020 until 2022), currently on Effexor 37.5 mg daily (recently started by PCP) and Xanax 0.5 mg BID presented with acute exacerbation of depression and anxiety in the context of social isolation and psychosocial stressors over past two years since moved to the  new house.  - Increased Effexor to 75 mg po daily on 3/26/25; doses to be adjusted slowly based on CrCl.  - Xanax tapered off successfully due to concerns for MCI and fall risk  - Melatonin 3 mg nightly  - Trazodone 50 mg nightly for insomnia  - individual therapy recommended  MADAN (generalized anxiety disorder)  - Management as per principal problem    MCI (mild cognitive impairment)  - SLUMS: 24/30 on 3/25/25  - Fall and delirium precaution    CAD (coronary artery disease)  - f/u w/ medical providers  Hyperlipidemia  - f/u w/ medical providers  Hypothyroidism  - f/u w/ medical providers  SARAY (obstructive sleep apnea)  - outpatient f/u w/ sleep medicine recommended  Type 2 diabetes mellitus with hyperglycemia, with long-term current use of insulin ([facility])  Lab Results   Component Value Date    HGBA1C 11.1 (H) 03/07/2025       Recent Labs     03/27/25  1138 03/27/25  1604 03/27/25 2119 03/28/25  0728   POCGLU 155* 182* 172* 133   - f/u w/ medical providers    Blood Sugar Average: Last 72 hrs:  (P) 166.3117317618727757    Stage 3b chronic kidney disease (HCC)  Lab Results   Component Value Date    EGFR 35 03/27/2025    EGFR 26 03/26/2025    EGFR 20 03/25/2025    CREATININE 1.43 (H) 03/27/2025    CREATININE 1.78 (H) 03/26/2025    CREATININE 2.22 (H) 03/25/2025   - f/u w/ medical providers  Essential hypertension  - f/u w/ medical providers  Hypokalemia  - f/u w/ medical providers  Fall  - fall precaution  - Avoid using benzodiazpines  Type 2 diabetes mellitus, with long-term current use of insulin ([facility])  Lab Results   Component Value Date    HGBA1C 11.1 (H) 03/07/2025       Recent Labs     03/27/25  1138 03/27/25  1604 03/27/25 2119 03/28/25  0728   POCGLU 155* 182* 172* 133       Blood Sugar Average: Last 72 hrs:  (P) 166.0080370189730713  - f/u w/ medical providers    Medical Problems       Resolved Problems  Date Reviewed: 3/27/2025          Resolved    Medical clearance for psychiatric admission 3/27/2025      Resolved by  Fede Werner MD    KATIE (acute kidney injury) ([facility]) 3/27/2025     Resolved by  Fede Werner MD            Discharge Medications:  See after visit summary for reconciled discharge medications provided to patient and family.      Discharge instructions/Information to patient and family:   See after visit summary for information provided to patient and family.      Provisions for Follow-Up Care:  See after visit summary for information related to follow-up care and any pertinent home health orders.      Discharge Statement   I have spent a total time of 30 minutes in caring for this patient on the day of the visit/encounter.     Discharge on Two Antipsychotic Medications: No    Fede Werner MD 03/28/25

## 2025-03-28 NOTE — ASSESSMENT & PLAN NOTE
- h/o MDD and MADAN w/ panic attacks, previously in outpatient treatment by Dr. Miller (2020 until 2022), currently on Effexor 37.5 mg daily (recently started by PCP) and Xanax 0.5 mg BID presented with acute exacerbation of depression and anxiety in the context of social isolation and psychosocial stressors over past two years since moved to the [facility].  - Increased Effexor to 75 mg po daily on 3/26/25; doses to be adjusted slowly based on CrCl.  - Xanax tapered off successfully due to concerns for MCI and fall risk  - Melatonin 3 mg nightly  - Trazodone 50 mg nightly for insomnia  - individual therapy recommended

## 2025-03-28 NOTE — CASE MANAGEMENT
Case Management Discharge Planning Note    Patient name Yajaira Marsh  [facility] [id number]  : 1945 Date 3/28/2025       Current Admission Date: 3/24/2025  Current Admission Diagnosis:Severe episode of recurrent major depressive disorder, without psychotic features ([facility])   Patient Active Problem List    Diagnosis Date Noted Date Diagnosed    MCI (mild cognitive impairment) 2025     Hypokalemia 2025     Hypoglycemia 2025     Ambulatory dysfunction 2025     Essential hypertension 2025     Depressive disorder 2025     Stage 3b chronic kidney disease (HCC) 2024     Current mild episode of major depressive disorder without prior episode ([facility]) 2024     Diarrhea 10/17/2024     Type 2 diabetes mellitus with hyperglycemia, with long-term current use of insulin ([facility]) 2024     Obesity (BMI 30-39.9) 10/13/2023     Kidney stone 2023     Atherosclerosis of both carotid arteries 07/10/2023     Renal insufficiency 07/10/2023     Kidney mass 2023     Fall 2023     Anxiety 2022     Osteoarthritis of spine with radiculopathy, lumbar region 2022     Lumbar radiculopathy 2022     Microalbuminuria 2021     S/P CABG (coronary artery bypass graft) 2021     Apraxia 2021     Herpes zoster without complication 2021     MADAN (generalized anxiety disorder) 2020     SARAY (obstructive sleep apnea) 2018     Allergic rhinitis 2018     Onychomycosis 10/27/2017     Lung nodule 2017     Severe episode of recurrent major depressive disorder, without psychotic features (HCC) 2017     History of DVT (deep vein thrombosis) 2017     CAD (coronary artery disease) 2017     Hyperlipidemia 2017     Hypothyroidism 2017     Spinal stenosis of lumbar region 2017     Vitamin B12 deficiency 2016     Diabetic polyneuropathy associated with type 2  diabetes mellitus (HCC) 10/07/2015     Vitreo-retinal adhesions 04/21/2015     Adenomatous polyp of colon 11/30/2013     Psoriasis with arthropathy (HCC) 11/05/2013     Chronic mastoiditis 06/06/2013     Vitamin D deficiency 08/31/2012     Type 2 diabetes mellitus, with long-term current use of insulin (HCC) 12/30/2010       LOS (days): 4  Geometric Mean LOS (GMLOS) (days):   Days to GMLOS:     OBJECTIVE:  Risk of Unplanned Readmission Score: 36.94         Current admission status: Inpatient Psych   Preferred Pharmacy:   [id number] - [place], [place] - [address] [address]  Phone: [phone] Fax: [phone]    Primary Care Provider: Sukumar Clemens DO    Primary Insurance: [employer]  Secondary Insurance:     DISCHARGE DETAILS:    Discharge planning discussed with:: Patient and Daughter  Freedom of Choice: Yes     CM contacted family/caregiver?: Yes  Were Treatment Team discharge recommendations reviewed with patient/caregiver?: Yes  Did patient/caregiver verbalize understanding of patient care needs?: Yes  Were patient/caregiver advised of the risks associated with not following Treatment Team discharge recommendations?: Yes    Contacts  Patient Contacts: Kathryn  Relationship to Patient:: Family  Contact Method: Phone  Phone Number: [phone]  Reason/Outcome: Continuity of Care, Emergency Contact, Discharge Planning        03/28/25 0816   Discharge Planning   Living Arrangements Lives w/ Spouse/significant other   Support Systems Self;Spouse/significant other;Children   Assistance Needed None   Type of Current Residence Private residence   Current Home Care Services No   Discharge Communications   Discharge planning discussed with: Patient and Daughter   Freedom of Choice Yes   CM contacted family/caregiver? Yes   Were Treatment Team discharge recommendations reviewed with patient/caregiver? Yes   Did patient/caregiver verbalize understanding of patient care  needs? Yes   Were patient/caregiver advised of the risks associated with not following Treatment Team discharge recommendations? Yes   Contacts   Patient Contacts Kathryn   Relationship to Patient: Family   Contact Method Phone   Phone Number [phone]   Reason/Outcome Continuity of Care;Emergency Contact;Discharge Planning   [facility]   Would you like to participate in our [facility] service program?   No - Declined      03/28/25 0818    Discharge Notification   Notification of Discharge Provided to: Family;PCP;Psychiatrist;Therapist   Family Notified via: Phone call   PCP Notified via: Fax   Psychiatrist Notified via: Fax   Therapist Notified via: Fax   Patient discharged from the unit on this day. Denied SI/HI, psychosis and/or A/V.  No questions verbalized. Patient is in agreement with discharge.  Patient provided with after care appointment, discharge instructions and medication regime reviewed. Affect  is appropriate . Accompanied to the lobby where pt was met by daughter. Patient transported to home by daughter.   All belongings returned.

## 2025-03-28 NOTE — ASSESSMENT & PLAN NOTE
Lab Results   Component Value Date    HGBA1C 11.1 (H) 03/07/2025       Recent Labs     03/27/25  1138 03/27/25  1604 03/27/25  2119 03/28/25  0728   POCGLU 155* 182* 172* 133       Blood Sugar Average: Last 72 hrs:  (P) [id number]  - f/u w/ medical providers

## 2025-03-28 NOTE — NURSING NOTE
Patient visible in the unit. She is pleasant on approach and compliant with medications. Patient endorses anxiety due to discharge tomorrow. She currently denies all other psych s/s. Will frequently monitor for safety and support.

## 2025-03-31 ENCOUNTER — OFFICE VISIT (OUTPATIENT)
Dept: INTERNAL MEDICINE CLINIC | Facility: CLINIC | Age: 80
End: 2025-03-31
Payer: COMMERCIAL

## 2025-03-31 ENCOUNTER — TRANSITIONAL CARE MANAGEMENT (OUTPATIENT)
Dept: INTERNAL MEDICINE CLINIC | Facility: CLINIC | Age: 80
End: 2025-03-31

## 2025-03-31 ENCOUNTER — HOSPITAL ENCOUNTER (EMERGENCY)
Facility: HOSPITAL | Age: 80
Discharge: HOME | End: 2025-03-31
Attending: EMERGENCY MEDICINE
Payer: COMMERCIAL

## 2025-03-31 ENCOUNTER — NURSE TRIAGE (OUTPATIENT)
Age: 80
End: 2025-03-31

## 2025-03-31 VITALS
OXYGEN SATURATION: 100 % | DIASTOLIC BLOOD PRESSURE: 65 MMHG | HEART RATE: 63 BPM | SYSTOLIC BLOOD PRESSURE: 152 MMHG | RESPIRATION RATE: 18 BRPM | TEMPERATURE: 97.8 F

## 2025-03-31 VITALS
HEART RATE: 81 BPM | WEIGHT: 172.6 LBS | DIASTOLIC BLOOD PRESSURE: 70 MMHG | OXYGEN SATURATION: 100 % | SYSTOLIC BLOOD PRESSURE: 128 MMHG | TEMPERATURE: 98 F | BODY MASS INDEX: 32.61 KG/M2

## 2025-03-31 DIAGNOSIS — F33.2 SEVERE EPISODE OF RECURRENT MAJOR DEPRESSIVE DISORDER, WITHOUT PSYCHOTIC FEATURES (HCC): ICD-10-CM

## 2025-03-31 DIAGNOSIS — Z79.4 TYPE 2 DIABETES MELLITUS WITH HYPERGLYCEMIA, WITH LONG-TERM CURRENT USE OF INSULIN (HCC): Primary | ICD-10-CM

## 2025-03-31 DIAGNOSIS — E53.8 B12 DEFICIENCY: ICD-10-CM

## 2025-03-31 DIAGNOSIS — N18.32 STAGE 3B CHRONIC KIDNEY DISEASE (HCC): ICD-10-CM

## 2025-03-31 DIAGNOSIS — R68.2 DRY MOUTH: ICD-10-CM

## 2025-03-31 DIAGNOSIS — E83.42 HYPOMAGNESEMIA: ICD-10-CM

## 2025-03-31 DIAGNOSIS — R04.0 EPISTAXIS: Primary | ICD-10-CM

## 2025-03-31 DIAGNOSIS — R09.82 PND (POST-NASAL DRIP): ICD-10-CM

## 2025-03-31 DIAGNOSIS — E11.65 TYPE 2 DIABETES MELLITUS WITH HYPERGLYCEMIA, WITH LONG-TERM CURRENT USE OF INSULIN (HCC): Primary | ICD-10-CM

## 2025-03-31 PROCEDURE — 99283 EMERGENCY DEPT VISIT LOW MDM: CPT | Performed by: EMERGENCY MEDICINE

## 2025-03-31 PROCEDURE — 99496 TRANSJ CARE MGMT HIGH F2F 7D: CPT | Performed by: INTERNAL MEDICINE

## 2025-03-31 PROCEDURE — 99283 EMERGENCY DEPT VISIT LOW MDM: CPT

## 2025-03-31 RX ORDER — OXYMETAZOLINE HYDROCHLORIDE 0.05 G/100ML
1 SPRAY NASAL ONCE
Status: COMPLETED | OUTPATIENT
Start: 2025-03-31 | End: 2025-03-31

## 2025-03-31 RX ORDER — ACYCLOVIR 400 MG/1
1 TABLET ORAL
Qty: 3 EACH | Refills: 5 | Status: SHIPPED | OUTPATIENT
Start: 2025-03-31

## 2025-03-31 RX ORDER — FLUTICASONE PROPIONATE 50 MCG
2 SPRAY, SUSPENSION (ML) NASAL
Qty: 1 G | Refills: 0 | Status: SHIPPED | OUTPATIENT
Start: 2025-03-31

## 2025-03-31 RX ADMIN — OXYMETAZOLINE HYDROCHLORIDE 1 SPRAY: 0.05 SPRAY NASAL at 17:44

## 2025-03-31 NOTE — ASSESSMENT & PLAN NOTE
Lab Results   Component Value Date    EGFR 35 03/27/2025    EGFR 26 03/26/2025    EGFR 20 03/25/2025    CREATININE 1.43 (H) 03/27/2025    CREATININE 1.78 (H) 03/26/2025    CREATININE 2.22 (H) 03/25/2025     Drink adequate amount of water, avoid anti-inflammatories, reduce sodium in the diet and will continue to monitor the kidney function  Orders:  •  Comprehensive metabolic panel; Future

## 2025-03-31 NOTE — ASSESSMENT & PLAN NOTE
Patient reports me that her G7 sensor has not been working she is down to her last 1 and will need a refill today her blood sugars during the hospitalization were well-controlled she reports me that she was not eating as much and she was on a diabetic diet she has not seen her blood sugar since discharge and will need a new sensor which her  will assist putting it on her today so she can see where her blood sugar is  Lab Results   Component Value Date    HGBA1C 11.1 (H) 03/07/2025       Orders:  •  Continuous Glucose Sensor (Dexcom G7 Sensor); Use 1 Device every 10 days

## 2025-03-31 NOTE — ASSESSMENT & PLAN NOTE
Transition care management visit regarding recent hospitalization at [facility]'s admission date 3/20/2025 and discharge date 3/28/2025.  The patient initially was seen in [facility] ER for diarrhea she reported severe depression and anxiety in the ER 3/23/2025 she reported increased anxiety, insomnia and depression.  The patient signed a 201 got admitted to the inpatient psychiatry unit 6B for further psychiatric stabilization.  During that hospitalization she reported that she felt very isolated since moving into the new house in a senior living community about 2 years ago.  She reported that her  84 does not talk to her much and spending a lot of time on his computer.  She did not have a close relationship with the neighbors and she feels very isolated.  She reported feeling depressed decreased appetite and energy deep increased anxiety increased anticipatory anxiety of insomnia..  No SI during the hospitalization she was able to taper off Xanax and start on trazodone 50 mg at bedtime Effexor was increased to 75 mg.  She is now sleeping better she is on melatonin 3 mg nightly she will be following up with psychiatry Dr. Castano psychiatry along with psychologist Dr. Donato Mohamud PhD patient does report to me dry mouth from the side effect of trazodone I did recommend the patient can use a coolmist vaporizer,  xylimelts prn OTC and Biotene for dry mouth mouthwash

## 2025-03-31 NOTE — PROGRESS NOTES
Transition of Care Visit  Name: Yajaira Marsh      : 1945      MRN: [id number]  Encounter Provider: Sukumar Clemens DO  Encounter Date: 3/31/2025   Enc[facility]: [facility]    Assessment & Plan  Type 2 diabetes mellitus with hyperglycemia, with long-term current use of insulin (HCC)  Patient reports me that her G7 sensor has not been working she is down to her last 1 and will need a refill today her blood sugars during the hospitalization were well-controlled she reports me that she was not eating as much and she was on a diabetic diet she has not seen her blood sugar since discharge and will need a new sensor which her  will assist putting it on her today so she can see where her blood sugar is  Lab Results   Component Value Date    HGBA1C 11.1 (H) 2025       Orders:  •  Continuous Glucose Sensor (Dexcom G7 Sensor); Use 1 Device every 10 days    Stage 3b chronic kidney disease (HCC)  Lab Results   Component Value Date    EGFR 35 2025    EGFR 26 2025    EGFR 20 2025    CREATININE 1.43 (H) 2025    CREATININE 1.78 (H) 2025    CREATININE 2.22 (H) 2025     Drink adequate amount of water, avoid anti-inflammatories, reduce sodium in the diet and will continue to monitor the kidney function  Orders:  •  Comprehensive metabolic panel; Future    B12 deficiency  Recent diagnosis of B12 deficiency she is now on vitamin B12 1000 mcg once per day will recheck B12 level in 2 to 4 weeks  Orders:  •  Vitamin B12; Future    Hypomagnesemia  Currently on magnesium oxide 400 mg 2 tablets twice daily will check magnesium level likely related to losses from diarrhea which has resolved  Orders:  •  Magnesium; Future    PND (post-nasal drip)  Recommend coolmist vaporizer, Flonase 2 sprays each nostril once daily  Orders:  •  fluticasone (FLONASE) 50 mcg/act nasal spray; 2 sprays into each nostril daily at bedtime as needed for  rhinitis    Severe episode of recurrent major depressive disorder, without psychotic features (HCC)  Transition care management visit regarding recent hospitalization at [facility] admission date 3/20/2025 and discharge date 3/28/2025.  The patient initially was seen in [facility] ER for diarrhea she reported severe depression and anxiety in the ER 3/23/2025 she reported increased anxiety, insomnia and depression.  The patient signed a 201 got admitted to the inpatient psychiatry unit 6B for further psychiatric stabilization.  During that hospitalization she reported that she felt very isolated since moving into the new house in a senior living community about 2 years ago.  She reported that her  84 does not talk to her much and spending a lot of time on his computer.  She did not have a close relationship with the neighbors and she feels very isolated.  She reported feeling depressed decreased appetite and energy deep increased anxiety increased anticipatory anxiety of insomnia..  No SI during the hospitalization she was able to taper off Xanax and start on trazodone 50 mg at bedtime Effexor was increased to 75 mg.  She is now sleeping better she is on melatonin 3 mg nightly she will be following up with psychiatry Dr. Castano psychiatry along with psychologist Dr. Donato Mohamud PhD patient does report to me dry mouth from the side effect of trazodone I did recommend the patient can use a coolmist vaporizer,  xylimelts prn OTC and Biotene for dry mouth mouthwash         Dry mouth  Secondary to trazodone I did recommend the patient can use a coolmist vaporizer,  xylimelts prn OTC and Biotene for dry mouth mouthwash     RTO as scheduled call if any problems       History of Present Illness     Transitional Care Management Review:   Yajaira Marsh is a 79 y.o. female here for TCM follow up.  79-year-old female coming in for a transition care management visit regarding recent  hospitalization at [facility] for anxiety depression and insomnia.  Initially she presented to the ER with decreased appetite diarrhea diarrhea has improved.  Of note the patient did have increased insomnia anxiety and depression and in the ER she decided to sign a 201 and was admitted to the inpatient psychiatry unit 6-week for further psychiatric stabilization.  During the admission treatment plan was focused on pharmacotherapy and a mildly low therapy and group therapy and individualized psychotherapy.  Medications were changed Effexor XR increased to 75 mg once daily and started on trazodone 50 mg at bedtime she did have a good response although does have dry mouth secondary.  She r was tapered off the Xanax because of the risk of fall and elderly and neurocognitive decline.  The patient's symptoms improved gradually over the hospital course and by end of treatment she was doing well mood stable at the time of discharge no SI today we did review the discharge summary laboratories and test during the hospitalization she does have follow-up with outpatient psychiatry and counselor.  She reports me she is doing better diarrhea has improved mood is doing better although she is stressed because the bills were not paid at home and date house is disorganized.  She reports me that she is planning to move back to her old neighborhood and the house is equipped for her needs.  She will be around her friends.  She reports to me that she feels isolated where she lives right now and that her  does not talk to her too much she is on the computer.  He also does have seizure difficulties with cognition.  Patient does report to me postnasal drip also dry mouth since starting trazodone.    During the TCM phone call patient stated:  TCM Call (since 3/17/2025)     Hospital care reviewed  Records reviewed    Patient was hospitialized at  [facility] [place] [place]    Date of Admission  03/23/25     Date of discharge  03/28/25    Diagnosis  Psychiatric Eval    Disposition  Home      TCM Call (since 3/17/2025)     Scheduled for follow up?  Yes    I have advised the patient to call PCP with any new or worsening symptoms  Zhanna Marks MR        HPI  Review of Systems   Constitutional:  Negative for activity change, appetite change and unexpected weight change.   HENT:  Negative for congestion and postnasal drip.    Eyes:  Negative for visual disturbance.   Respiratory:  Negative for cough and shortness of breath.    Cardiovascular:  Negative for chest pain.   Gastrointestinal:  Negative for abdominal pain, diarrhea, nausea and vomiting.   Neurological:  Negative for dizziness, light-headedness and headaches.   Hematological:  Negative for adenopathy.   Dry mouth, anxiety  Objective   /70 (BP Location: Left arm, Patient Position: Sitting, Cuff Size: Standard)   Pulse 81   Temp 98 °F (36.7 °C) (Tympanic)   Wt 78.3 kg (172 lb 9.6 oz)   SpO2 100%   BMI 32.61 kg/m²   Dry tongue normal airway  Physical Exam  Vitals and nursing note reviewed.   Constitutional:       General: She is not in acute distress.     Appearance: Normal appearance. She is well-developed. She is obese. She is not ill-appearing, toxic-appearing or diaphoretic.   HENT:      Head: Normocephalic and atraumatic.      Right Ear: External ear normal.      Left Ear: External ear normal.      Nose: Nose normal.   Eyes:      Pupils: Pupils are equal, round, and reactive to light.   Cardiovascular:      Rate and Rhythm: Normal rate and regular rhythm.      Heart sounds: Normal heart sounds. No murmur heard.  Pulmonary:      Effort: Pulmonary effort is normal.      Breath sounds: Normal breath sounds.   Abdominal:      General: There is no distension.      Palpations: Abdomen is soft.      Tenderness: There is no abdominal tenderness. There is no guarding.   Neurological:      Mental Status: She is alert.   Psychiatric:         Mood and Affect:  Mood is anxious. Mood is not depressed.         Thought Content: Thought content does not include suicidal ideation.         Chronic lymphedema stable

## 2025-03-31 NOTE — ASSESSMENT & PLAN NOTE
Secondary to trazodone I did recommend the patient can use a coolmist vaporizer,  xylimelts prn OTC and Biotene for dry mouth mouthwash     RTO as scheduled call if any problems

## 2025-03-31 NOTE — ED PROVIDER NOTES
"Time reflects when diagnosis was documented in both MDM as applicable and the Disposition within this note       Time User Action Codes Description Comment    3/31/2025  6:01 PM Saad Babin Add [R04.0] Epistaxis           ED Disposition       ED Disposition   Discharge    Condition   Stable    Date/Time   Mon Mar 31, 2025  6:01 PM    Comment   Yajaira Charbelalfonzokatrina discharge to home/self care.                   Assessment & Plan       Medical Decision Making  79-year-old female presents to the emergency department with chief complaint of epistaxis.  Patient seen and examined, no active bleeding, Afrin provided.  Patient provided with multiple nasal clips.  Advised patient on use of humidifier as well as topical moisturizers to ensure proper hydration.  Advised patient not to cease aspirin or other medications. Patient appears well, nontoxic, agrees with plan of care at this time.  Answered all questions.  In light of this, patient would benefit from outpatient follow-up.    Risk  OTC drugs.             Medications   oxymetazoline (AFRIN) 0.05 % nasal spray 1 spray (1 spray Left Nare Given 3/31/25 6701)       ED Risk Strat Scores                                                History of Present Illness       Chief Complaint   Patient presents with    Nose Bleed     PT present to ER with a nose bleed that started yesterday afternoon \"It dried up pretty quick,but it keeps coming back. I started new medicine, so maybe it was that\" Daughter reports \"She started asa 81mg, melatonin and trazadone.       Past Medical History:   Diagnosis Date    Acute embolism and thrombosis of unspecified deep veins of unspecified lower extremity (HCC)     Last Assessed:  5/18/17    Anemia     Anosmia     Anxiety     Arthritis     Asthma     Back pain     Bilateral macular retinal edema     CAD (coronary artery disease)     Cataract     Cervical disc herniation     Cervical radiculopathy     Cervical spinal stenosis     Cervical " spondylolysis     Chronic kidney disease     Chronic mastoiditis     Colon polyp     Complex endometrial hyperplasia     Depression     Diabetes mellitus (HCC)     Disease of thyroid gland     DVT (deep venous thrombosis) (HCC)     DVT (deep venous thrombosis) (HCC) 06/16/2017    Fibromyalgia     Fibromyalgia, primary     Hyperlipidemia     Hypertension     Hypothyroid     Kidney stone     Lumbar radiculopathy     Neck pain     Obese     PONV (postoperative nausea and vomiting)     Shingles 07/01/2021    Spinal stenosis     Stomach ulcer     Thyroid disease     Toxic metabolic encephalopathy 02/11/2025    Vascular claudication ([facility], located within [facility]) 12/11/2017      Past Surgical History:   Procedure Laterality Date    BACK SURGERY      CARPAL TUNNEL RELEASE      CATARACT EXTRACTION      CHOLECYSTECTOMY      COLONOSCOPY      CORONARY ANGIOPLASTY      CORONARY ARTERY BYPASS GRAFT      CYSTOSCOPY N/A 6/20/2017    Procedure: CYSTOSCOPY;  Surgeon: Tacho Arnold MD;  Location: [facility];  Service: Gynecology Oncology    CYSTOSCOPY      VA LAPS TOTAL HYSTERECT 250 GM/< W/RMVL TUBE/OVARY N/A 6/20/2017    Procedure: ROBOTIC HYSTERECTOMY; BILATERAL SALPINO-OOPHERECTOMY; umbilical hernia repair.;  Surgeon: Tacho Arnold MD;  Location: [facility];  Service: Gynecology Oncology    VA REPAIR FIRST ABDOMINAL WALL HERNIA N/A 5/12/2022    Procedure: REPAIR HERNIA INCISIONAL;  Surgeon: Horacio Shcerer DO;  Location: [facility];  Service: General    TONSILECTOMY AND ADNOIDECTOMY      TONSILLECTOMY      UMBILICAL HERNIA REPAIR        Family History   Problem Relation Age of Onset    Arthritis Mother     Leukemia Mother     Other Mother         Anxiety, major depressive disorder, recurrent episode with atypical features    Coronary artery disease Father         Heart problem    Diabetes Father     Other Father         Infectious disease    Alzheimer's disease Maternal Grandmother     Other Maternal Grandfather         Heart problem    Other  "Daughter         Anxiety, major depressive disorder, recurrent episode with atypical features    Alcohol abuse Other         Grandparent    Cancer Family     Diabetes Family     Hypertension Family     Other Family         Reported prior back trouble, thyroid disorder      Social History     Tobacco Use    Smoking status: Former     Current packs/day: 0.00     Average packs/day: 1 pack/day for 6.0 years (6.0 ttl pk-yrs)     Types: Cigarettes     Start date:      Quit date:      Years since quittin.2     Passive exposure: Never    Smokeless tobacco: Never    Tobacco comments:     Smoked about a half a pack for about 4 yrs.  Quite about 50 yrs ago.   Vaping Use    Vaping status: Never Used   Substance Use Topics    Alcohol use: Never    Drug use: No      E-Cigarette/Vaping    E-Cigarette Use Never User       E-Cigarette/Vaping Substances    Nicotine No     THC No     CBD No     Flavoring No     Other No     Unknown No       I have reviewed and agree with the history as documented.     (Yajaira Marsh) Yajaira Marsh is a 79 y.o. female     They presented to the emergency department on 2025. Patient presents with:  Nose Bleed: PT present to ER with a nose bleed that started yesterday afternoon \"It dried up pretty quick,but it keeps coming back. I started new medicine, so maybe it was that\" Daughter reports \"She started asa 81mg, melatonin and trazadone.  .    The patient states that she was on her way home at which point she noted that she was experiencing a left sided nosebleed.  Patient states that she was able to get controlled with pressure, however this has recurred 4 times and decided come to the emergency department for evaluation.  Daughter at bedside expresses concern that this may be caused by patient's new medications including baby aspirin, trazodone as well as melatonin.  Patient states that she does pick her nose.  Patient denies pain, blood in back of throat, nausea, " "vomiting, or any other complaint at this time.              Review of Systems   HENT:  Positive for nosebleeds.    All other systems reviewed and are negative.          Objective       ED Triage Vitals [03/31/25 1713]   Temperature Pulse Blood Pressure Respirations SpO2 Patient Position - Orthostatic VS   97.8 °F (36.6 °C) 77 (S) (!) 233/96 18 99 % Lying      Temp Source Heart Rate Source BP Location FiO2 (%) Pain Score    Oral Monitor Right arm -- --      Vitals      Date and Time Temp Pulse SpO2 Resp BP Pain Score FACES Pain Rating User   03/31/25 1730 -- 63 100 % -- 152/65 -- -- LA   03/31/25 1713 97.8 °F (36.6 °C) 77 99 % 18  233/96 patient did not take BP medication today \"I couldnt find it\" -- -- AG            Physical Exam  Vitals and nursing note reviewed.   Constitutional:       Appearance: Normal appearance.   HENT:      Head: Normocephalic and atraumatic.      Right Ear: External ear normal.      Left Ear: External ear normal.      Nose:      Comments: No active bleeding, dried blood in left nare, small 2 mm irritation along left anterior medial septum     Mouth/Throat:      Mouth: Mucous membranes are moist.   Eyes:      Conjunctiva/sclera: Conjunctivae normal.   Abdominal:      General: Abdomen is flat.   Musculoskeletal:         General: Normal range of motion.      Cervical back: Normal range of motion.   Skin:     General: Skin is warm and dry.   Neurological:      Mental Status: She is alert. Mental status is at baseline.   Psychiatric:         Mood and Affect: Mood normal.         Results Reviewed       None            No orders to display       Procedures    ED Medication and Procedure Management   Prior to Admission Medications   Prescriptions Last Dose Informant Patient Reported? Taking?   Continuous Glucose Sensor (Dexcom G7 Sensor)   No No   Sig: Use 1 Device every 10 days   aspirin 81 mg chewable tablet   No No   Sig: Chew 1 tablet (81 mg total) daily   cholecalciferol (VITAMIN D3) 1,000 " units tablet   No No   Sig: Take 2 tablets (2,000 Units total) by mouth daily   cyanocobalamin (VITAMIN B-12) 1000 MCG tablet   No No   Sig: Take 1 tablet (1,000 mcg total) by mouth daily   diltiazem (CARDIZEM CD) 120 mg 24 hr capsule   No No   Sig: Take 1 capsule (120 mg total) by mouth daily   fluticasone (FLONASE) 50 mcg/act nasal spray   No No   Si sprays into each nostril daily at bedtime as needed for rhinitis   insulin glargine (LANTUS) 100 units/mL subcutaneous injection   No No   Sig: Inject 7 Units under the skin daily at bedtime   isosorbide mononitrate (IMDUR) 30 mg 24 hr tablet   No No   Sig: Take 1 tablet (30 mg total) by mouth daily   levothyroxine 75 mcg tablet   No No   Sig: Take 1 tablet (75 mcg total) by mouth daily   magnesium Oxide (MAG-OX) 400 mg TABS   No No   Sig: Take 2 tablets (800 mg total) by mouth 2 (two) times a day   melatonin 3 mg   No No   Sig: Take 1 tablet (3 mg total) by mouth daily at bedtime   pantoprazole (PROTONIX) 40 mg tablet   No No   Sig: Take 1 tablet (40 mg total) by mouth daily   pravastatin (PRAVACHOL) 40 mg tablet   No No   Sig: Take 1 tablet (40 mg total) by mouth daily with dinner   traZODone (DESYREL) 50 mg tablet   No No   Sig: Take 1 tablet (50 mg total) by mouth daily at bedtime   venlafaxine (EFFEXOR-XR) 75 mg 24 hr capsule   No No   Sig: Take 1 capsule (75 mg total) by mouth daily      Facility-Administered Medications: None     Patient's Medications   Discharge Prescriptions    No medications on file     No discharge procedures on file.  ED SEPSIS DOCUMENTATION   Time reflects when diagnosis was documented in both MDM as applicable and the Disposition within this note       Time User Action Codes Description Comment    3/31/2025  6:01 PM Saad Babin Add [R04.0] Epistaxis                  aSad Babin MD  [phone]

## 2025-03-31 NOTE — ED ATTENDING ATTESTATION
3/31/2025  IYolis DO, saw and evaluated the patient. I have discussed the patient with the resident/non-physician practitioner and agree with the resident's/non-physician practitioner's findings, Plan of Care, and MDM as documented in the resident's/non-physician practitioner's note, except where noted. All available labs and Radiology studies were reviewed.  I was present for key portions of any procedure(s) performed by the resident/non-physician practitioner and I was immediately available to provide assistance.       At this point I agree with the current assessment done in the Emergency Department.  I have conducted an independent evaluation of this patient a history and physical is as follows:    ED Course   79 year old female presents for evaluation of epistaxis.  Patient states she has had intermittent bleeding from the left side of her nose over the past 1 day.  Pressure was applied in triage and patient is no longer bleeding.  She states that she has had a dry nose and mouth since starting trazodone to help with chronic insomnia.  Patient does not take anticoagulation besides aspirin.    Past medical history: Depression, anxiety, type 2 diabetes    Physical exam: Patient is awake and alert resting in no acute distress, nontoxic in appearance.  There is erythema and excoriation of the nasal septum bilateral.  No active bleeding.  Area of bleeding on the left septum visualized.  No blood in the posterior pharynx.  Patient swallowing without difficulty.    Assessment/plan: 79-year-old female presents with recurrent bleeding from the left nare.  Bleeding is controlled.  Patient appears to have areas of excoriation on the nasal septum bilateral, left greater than right.  Recommend applying topical emollient such as Vaseline or Aquaphor to keep the area moist.  Patient to avoid picking at the area.  Discussed signs and symptoms return to the emergency department  Critical Care Time  Procedures

## 2025-03-31 NOTE — TELEPHONE ENCOUNTER
"FOLLOW UP: Discuss with provider and call back    REASON FOR CONVERSATION: Nose Bleed    SYMPTOMS: Two nosebleeds today that each lasted about 10 minutes.     OTHER: Pt had had a few nosebleeds this week.  Today she had two nosebleeds.  They last about 10 minutes each.  She is not dizzy.  She takes ASA 81mg.  Pt is wondering if her trazodone could be causing her nose bleeds.  Care advice given.   ED precautions given. Please advise.     DISPOSITION: Discuss With PCP and Callback by Nurse Today (overriding See Within 2 Weeks in Office)      Reason for Disposition  • Hard-to-stop nosebleeds are a chronic symptom (recurrent or ongoing AND lasting > 4 weeks)    Answer Assessment - Initial Assessment Questions  1. AMOUNT OF BLEEDING: \"How bad is the bleeding?\" \"How much blood was lost?\" \"Has the bleeding stopped?\"      Moderate   2. ONSET: \"When did the nosebleed start?\"       Bout 10 minutes ago  3. FREQUENCY: \"How many nosebleeds have you had in the last 24 hours?\"       This is my second nosebleed of the day   4. RECURRENT SYMPTOMS: \"Have there been other recent nosebleeds?\" If Yes, ask: \"How long did it take you to stop the bleeding?\" \"What worked best?\"       They always stop within 10 mins   5. CAUSE: \"What do you think caused this nosebleed?\"      I'm not sure,  6. LOCAL FACTORS: \"Do you have any cold symptoms?\", \"Have you been rubbing or picking at your nose?\"      no  7. SYSTEMIC FACTORS: \"Do you have high blood pressure or any bleeding problems?\"      no  8. BLOOD THINNERS: \"Do you take any blood thinners?\" (e.g., aspirin, clopidogrel / Plavix, coumadin, heparin). Notes: Other strong blood thinners include: Arixtra (fondaparinux), Eliquis (apixaban), Pradaxa (dabigatran), and Xarelto (rivaroxaban).     ASA 81mg   9. OTHER SYMPTOMS: \"Do you have any other symptoms?\" (e.g., lightheadedness)      no  10. PREGNANCY: \"Is there any chance you are pregnant?\" \"When was your last menstrual period?\"        " no    Protocols used: Nosebleed-Adult-[place]

## 2025-04-01 NOTE — TELEPHONE ENCOUNTER
Spoke to the patient and advised. She states that she went to the ED for an evaluation and is not having any nose bleeds as of last night and today. She also advised that she did not take the Trazodone or ASA today. If the nose bleed returns, she will call back for the ENT referral.

## 2025-04-02 ENCOUNTER — TELEPHONE (OUTPATIENT)
Dept: PSYCHIATRY | Facility: CLINIC | Age: 80
End: 2025-04-02

## 2025-04-02 NOTE — TELEPHONE ENCOUNTER
One week follow up call for New Patient appointment with Silvino Castano on 4/24/25  was made on 4/2/25. Writer informed patient of New Patient paperwork needing to be completed 5 days prior to the appointment. Writer confirmed paperwork has been sent via     Appointment was made on: 3/26/25    Unable to leave a voicemail.

## 2025-04-03 ENCOUNTER — TELEPHONE (OUTPATIENT)
Dept: ENDOCRINOLOGY | Facility: CLINIC | Age: 80
End: 2025-04-03

## 2025-04-03 NOTE — TELEPHONE ENCOUNTER
[employer] [employer]/ML was denied by insurance, medication is not on their drug list.     Patient received temp supply 03/19/2025    Please send alternative. Not alternative listed on letter.     Letter scanned in media.

## 2025-04-08 ENCOUNTER — OFFICE VISIT (OUTPATIENT)
Dept: INTERNAL MEDICINE CLINIC | Facility: CLINIC | Age: 80
End: 2025-04-08
Payer: COMMERCIAL

## 2025-04-08 ENCOUNTER — OFFICE VISIT (OUTPATIENT)
Dept: NEPHROLOGY | Facility: CLINIC | Age: 80
End: 2025-04-08
Payer: COMMERCIAL

## 2025-04-08 VITALS
WEIGHT: 166 LBS | SYSTOLIC BLOOD PRESSURE: 130 MMHG | DIASTOLIC BLOOD PRESSURE: 76 MMHG | BODY MASS INDEX: 31.34 KG/M2 | HEIGHT: 61 IN | HEART RATE: 88 BPM

## 2025-04-08 VITALS
HEART RATE: 111 BPM | OXYGEN SATURATION: 97 % | DIASTOLIC BLOOD PRESSURE: 76 MMHG | HEIGHT: 61 IN | SYSTOLIC BLOOD PRESSURE: 124 MMHG | BODY MASS INDEX: 30.85 KG/M2 | WEIGHT: 163.4 LBS

## 2025-04-08 DIAGNOSIS — N28.9 RENAL INSUFFICIENCY: Primary | ICD-10-CM

## 2025-04-08 DIAGNOSIS — K14.0 GLOSSITIS: ICD-10-CM

## 2025-04-08 DIAGNOSIS — R04.0 EPISTAXIS: ICD-10-CM

## 2025-04-08 DIAGNOSIS — R10.13 DYSPEPSIA: ICD-10-CM

## 2025-04-08 DIAGNOSIS — F32.0 CURRENT MILD EPISODE OF MAJOR DEPRESSIVE DISORDER WITHOUT PRIOR EPISODE (HCC): ICD-10-CM

## 2025-04-08 DIAGNOSIS — S09.90XA INJURY OF HEAD, INITIAL ENCOUNTER: ICD-10-CM

## 2025-04-08 DIAGNOSIS — R43.2 ABNORMAL TASTE IN MOUTH: ICD-10-CM

## 2025-04-08 DIAGNOSIS — I10 ESSENTIAL HYPERTENSION: Primary | ICD-10-CM

## 2025-04-08 PROBLEM — N18.32 STAGE 3B CHRONIC KIDNEY DISEASE (HCC): Status: RESOLVED | Noted: 2024-11-18 | Resolved: 2025-04-08

## 2025-04-08 PROCEDURE — G2211 COMPLEX E/M VISIT ADD ON: HCPCS | Performed by: INTERNAL MEDICINE

## 2025-04-08 PROCEDURE — 99214 OFFICE O/P EST MOD 30 MIN: CPT | Performed by: INTERNAL MEDICINE

## 2025-04-08 NOTE — PROGRESS NOTES
Name: Yajaira Marsh      : 1945      MRN: [id number]  Encounter Provider: Sukumar Clemens DO  Encounter Date: 2025   [facility] [place]  :  Assessment & Plan  Essential hypertension  Hypertension - controlled, I have counseled patient following healthy balance diet, I would like the patient reduce sodium, exercise routinely, I would like the patient continued the med current medical regiment and we will continue to monitor.  Continue Cardizem  mg once daily       Current mild episode of major depressive disorder without prior episode (HCC)  Mild to moderate depression no suicidal ideation reports me increased stress related to bills that need to be paid that were not taking care of by her family members while she was in the hospital she does have an appointment coming up with psychiatry she will continue with Effexor XR 75 mg once daily supportive counseling and listening provided         Abnormal taste in mouth  Dysgeusia will check B12 level she has not been taking her B12 regularly she was diagnosed with B12  deficiency while hospitalized I have advised her to restart B12 1000 mcg once daily she also reports me decreased appetite because of this symptom I have ordered nutritional counseling and will check zinc level we will also obtain GI and ENT consultation  Orders:  •  Ambulatory Referral to Nutrition Services; Future  •  Vitamin B12; Future  •  Zinc; Future    Dyspepsia  Dyspepsia weight loss will have patient see GI for upper endoscopy  Orders:  •  Ambulatory Referral to Gastroenterology; Future    Injury of head, initial encounter  Recent head injury no loss of consciousness but does report to me headache recently bumped her head on the [place] did not seek help will check CT head  Orders:  •  CT head wo contrast; Future    Epistaxis  3 ER visits for recurrent epistaxis currently stable will have patient see ENT for cauterization  Orders:  •   "Ambulatory Referral to Otolaryngology; Future    Glossitis  Check B12 level  Orders:  •  Vitamin B12; Future    RTO in 3 months call for problems       History of Present Illness   HPI 79-year old female coming in for a follow up office visit regarding hypertension, depression, abnormal taste in the mouth, dyspepsia, recent head injury, recurrent epistaxis glossitis/B12 deficiency; the patient reports to me that decreased appetite and that everything does not taste right.  She has lost significant amounts of weight.  He feels that he said everything tastes muchy decreased appetite can't eat it this am 1/2 banana , reports.  Reports me feeling stressed depression mild at this point taking a Effexor reports me stressed regarding bills that her family members did not take care of while she was hospitalized no SI.  She reports me recently bumping her head on the grill at home approximately a week ago mild headache no loss of consciousness no neck pain.  She has an appointment coming up with psychiatry she also reports me stomach upset and weight loss      Review of Systems   Constitutional:  Positive for appetite change. Negative for activity change and unexpected weight change.   HENT:  Negative for congestion and postnasal drip.         Dysgeusia   Eyes:  Negative for visual disturbance.   Respiratory:  Negative for cough and shortness of breath.    Cardiovascular:  Negative for chest pain.   Gastrointestinal:  Negative for abdominal pain, diarrhea, nausea and vomiting.   Neurological:  Negative for dizziness, light-headedness and headaches.   Hematological:  Negative for adenopathy.   Psychiatric/Behavioral:  Negative for suicidal ideas.    Mild headaches since head injury    Objective   /76 (BP Location: Right arm, Patient Position: Sitting, Cuff Size: Large)   Pulse (!) 111   Ht 5' 1\" (1.549 m)   Wt 74.1 kg (163 lb 6.4 oz)   SpO2 97%   BMI 30.87 kg/m²      Physical Exam  Vitals and nursing note reviewed. "   Constitutional:       General: She is not in acute distress.     Appearance: Normal appearance. She is well-developed. She is obese. She is not ill-appearing, toxic-appearing or diaphoretic.   HENT:      Head: Normocephalic.   Eyes:      General: No scleral icterus.        Right eye: No discharge.         Left eye: No discharge.      Conjunctiva/sclera: Conjunctivae normal.      Pupils: Pupils are equal, round, and reactive to light.   Cardiovascular:      Rate and Rhythm: Normal rate and regular rhythm.      Heart sounds: Normal heart sounds. No murmur heard.     No friction rub. No gallop.   Pulmonary:      Effort: No respiratory distress.      Breath sounds: Normal breath sounds. No wheezing or rales.   Abdominal:      General: Bowel sounds are normal. There is no distension.      Palpations: Abdomen is soft. There is no mass.      Tenderness: There is no abdominal tenderness. There is no guarding or rebound.   Musculoskeletal:         General: No deformity.      Cervical back: Neck supple.   Lymphadenopathy:      Cervical: No cervical adenopathy.   Neurological:      Mental Status: She is alert.      Coordination: Coordination normal.   Psychiatric:         Mood and Affect: Mood is depressed. Mood is not anxious.         Thought Content: Thought content does not include suicidal ideation.       Administrative Statements   I have spent a total time of 30 minutes in caring for this patient on the day of the visit/encounter including Diagnostic results, Instructions for management, Risk factor reductions, Impressions, Documenting in the medical record, Reviewing/placing orders in the medical record (including tests, medications, and/or procedures), and Obtaining or reviewing history  .

## 2025-04-08 NOTE — ASSESSMENT & PLAN NOTE
The patient had renal insufficiency and over the last few months she has had multiple visits to the emergency room and even hospitalized for psychiatric reason was having changes in her renal function.  Her last creatinine was 1.4 which is even better than on my last visit so she was back down to her baseline.  She did not have significant proteinuria so I told her she does not have diabetic kidney disease.  At this point she is well compensated from a volume standpoint her blood pressure is under good control she is on a statin.  I told her to try maintain A1c 7% or lower to help reduce diabetic complication risk in the future.    She is well compensated from a volume standpoint renal function is back down to her baseline so at this point just continue current medications and measures above to help delay progression.    Labs and follow-up in 6 months at patient request  No changes to medications  To follow-up with primary doctor as she had an appointment scheduled for today and she wants to review some of the medications with him since she is left the hospital.    I have spent a total time of 30 minutes in caring for this patient on the day of the visit/encounter including Diagnostic results, Prognosis, Impressions, Reviewing/placing orders in the medical record (including tests, medications, and/or procedures), and Obtaining or reviewing history  .

## 2025-04-08 NOTE — ASSESSMENT & PLAN NOTE
Hypertension - controlled, I have counseled patient following healthy balance diet, I would like the patient reduce sodium, exercise routinely, I would like the patient continued the med current medical regiment and we will continue to monitor.  Continue Cardizem  mg once daily

## 2025-04-08 NOTE — ASSESSMENT & PLAN NOTE
Mild to moderate depression no suicidal ideation reports me increased stress related to bills that need to be paid that were not taking care of by her family members while she was in the hospital she does have an appointment coming up with psychiatry she will continue with Effexor XR 75 mg once daily supportive counseling and listening provided

## 2025-04-08 NOTE — PROGRESS NOTES
Name: Yajaira Marsh      : 1945      MRN: [id number]  Encounter Provider: Baislio Arreola MD  Encounter Date: 2025   Encounter department: [facility] [place]  :  Assessment & Plan  Renal insufficiency    The patient had renal insufficiency and over the last few months she has had multiple visits to the emergency room and even hospitalized for psychiatric reason was having changes in her renal function.  Her last creatinine was 1.4 which is even better than on my last visit so she was back down to her baseline.  She did not have significant proteinuria so I told her she does not have diabetic kidney disease.  At this point she is well compensated from a volume standpoint her blood pressure is under good control she is on a statin.  I told her to try maintain A1c 7% or lower to help reduce diabetic complication risk in the future.    She is well compensated from a volume standpoint renal function is back down to her baseline so at this point just continue current medications and measures above to help delay progression.    Labs and follow-up in 6 months at patient request  No changes to medications  To follow-up with primary doctor as she had an appointment scheduled for today and she wants to review some of the medications with him since she is left the hospital.    I have spent a total time of 30 minutes in caring for this patient on the day of the visit/encounter including Diagnostic results, Prognosis, Impressions, Reviewing/placing orders in the medical record (including tests, medications, and/or procedures), and Obtaining or reviewing history  .                History of Present Illness   HPI  Yajaira Marsh is a 79 y.o. female who presents for follow-up.  The patient states she is under tremendous amount of stress as there was issues with her  and they locked her keys out of the house they are trying to potentially moved to a better dwelling.  Also she is visited  "the ER a lot and had some admissions including a psychiatric admission for depression.  She really feels okay today just is under a lot of stress and feels she has a lot to do.  History obtained from: patient    Review of Systems   Constitutional:  Positive for fatigue. Negative for chills, diaphoresis and fever.   HENT:  Positive for nosebleeds.    Eyes: Negative.    Respiratory:  Negative for cough, chest tightness and shortness of breath.    Cardiovascular:  Negative for chest pain and leg swelling.   Gastrointestinal:  Negative for abdominal pain, diarrhea, nausea and vomiting.   Genitourinary: Negative.    Neurological:  Negative for dizziness and headaches.   Psychiatric/Behavioral:  Negative for agitation and behavioral problems.           Objective   /76 (BP Location: Right arm, Patient Position: Sitting, Cuff Size: Standard)   Pulse 88   Ht 5' 1\" (1.549 m)   Wt 75.3 kg (166 lb)   BMI 31.37 kg/m²      Physical Exam  Constitutional:       General: She is not in acute distress.     Appearance: She is not toxic-appearing.   HENT:      Head: Normocephalic and atraumatic.      Nose: Nose normal.      Mouth/Throat:      Mouth: Mucous membranes are dry.   Eyes:      Extraocular Movements: Extraocular movements intact.   Cardiovascular:      Rate and Rhythm: Normal rate and regular rhythm.      Heart sounds:      No gallop.   Pulmonary:      Effort: Pulmonary effort is normal. No respiratory distress.      Breath sounds: No wheezing or rales.   Abdominal:      General: Bowel sounds are normal. There is no distension.      Palpations: Abdomen is soft.      Tenderness: There is no abdominal tenderness.   Neurological:      Mental Status: She is alert and oriented to person, place, and time.           "

## 2025-04-08 NOTE — LETTER
2025     Sukumar Clemens DO  [address] [facility] [address]    Patient: Yajaira Marsh   YOB: 1945   Date of Visit: 2025       Dear Dr. Sukumar Clemens DO:    Thank you for referring Yajaira Marsh to me for evaluation. Below are my notes for this consultation.    If you have questions, please do not hesitate to call me. I look forward to following your patient along with you.         Sincerely,        Basilio Arreola MD        CC: No Recipients    Basilio Arreola MD  2025 12:01 PM  Sign when Signing Visit  Name: Yajaira Marsh      : 1945      MRN: [id number]  Encounter Provider: Basilio Arreola MD  Encounter Date: 2025   Encounter department: [facility] [place]  :  Assessment & Plan  Renal insufficiency    The patient had renal insufficiency and over the last few months she has had multiple visits to the emergency room and even hospitalized for psychiatric reason was having changes in her renal function.  Her last creatinine was 1.4 which is even better than on my last visit so she was back down to her baseline.  She did not have significant proteinuria so I told her she does not have diabetic kidney disease.  At this point she is well compensated from a volume standpoint her blood pressure is under good control she is on a statin.  I told her to try maintain A1c 7% or lower to help reduce diabetic complication risk in the future.    She is well compensated from a volume standpoint renal function is back down to her baseline so at this point just continue current medications and measures above to help delay progression.    Labs and follow-up in 6 months at patient request  No changes to medications  To follow-up with primary doctor as she had an appointment scheduled for today and she wants to review some of the medications with him since she is left the hospital.    I have spent a total time of 30 minutes in caring for this  "patient on the day of the visit/encounter including Diagnostic results, Prognosis, Impressions, Reviewing/placing orders in the medical record (including tests, medications, and/or procedures), and Obtaining or reviewing history  .                History of Present Illness  HPI  Yajaira Marsh is a 79 y.o. female who presents for follow-up.  The patient states she is under tremendous amount of stress as there was issues with her  and they locked her keys out of the house they are trying to potentially moved to a better dwelling.  Also she is visited the ER a lot and had some admissions including a psychiatric admission for depression.  She really feels okay today just is under a lot of stress and feels she has a lot to do.  History obtained from: patient    Review of Systems   Constitutional:  Positive for fatigue. Negative for chills, diaphoresis and fever.   HENT:  Positive for nosebleeds.    Eyes: Negative.    Respiratory:  Negative for cough, chest tightness and shortness of breath.    Cardiovascular:  Negative for chest pain and leg swelling.   Gastrointestinal:  Negative for abdominal pain, diarrhea, nausea and vomiting.   Genitourinary: Negative.    Neurological:  Negative for dizziness and headaches.   Psychiatric/Behavioral:  Negative for agitation and behavioral problems.           Objective  /76 (BP Location: Right arm, Patient Position: Sitting, Cuff Size: Standard)   Pulse 88   Ht 5' 1\" (1.549 m)   Wt 75.3 kg (166 lb)   BMI 31.37 kg/m²      Physical Exam  Constitutional:       General: She is not in acute distress.     Appearance: She is not toxic-appearing.   HENT:      Head: Normocephalic and atraumatic.      Nose: Nose normal.      Mouth/Throat:      Mouth: Mucous membranes are dry.   Eyes:      Extraocular Movements: Extraocular movements intact.   Cardiovascular:      Rate and Rhythm: Normal rate and regular rhythm.      Heart sounds:      No gallop.   Pulmonary:      Effort: " Pulmonary effort is normal. No respiratory distress.      Breath sounds: No wheezing or rales.   Abdominal:      General: Bowel sounds are normal. There is no distension.      Palpations: Abdomen is soft.      Tenderness: There is no abdominal tenderness.   Neurological:      Mental Status: She is alert and oriented to person, place, and time.

## 2025-04-10 ENCOUNTER — CONSULT (OUTPATIENT)
Dept: NEUROLOGY | Facility: CLINIC | Age: 80
End: 2025-04-10
Payer: COMMERCIAL

## 2025-04-10 VITALS
BODY MASS INDEX: 30.96 KG/M2 | HEIGHT: 61 IN | SYSTOLIC BLOOD PRESSURE: 134 MMHG | WEIGHT: 164 LBS | OXYGEN SATURATION: 96 % | DIASTOLIC BLOOD PRESSURE: 64 MMHG | HEART RATE: 93 BPM

## 2025-04-10 DIAGNOSIS — R93.0 ABNORMAL CT OF THE HEAD: ICD-10-CM

## 2025-04-10 PROCEDURE — 99204 OFFICE O/P NEW MOD 45 MIN: CPT | Performed by: PSYCHIATRY & NEUROLOGY

## 2025-04-10 RX ORDER — DILTIAZEM HYDROCHLORIDE 120 MG/1
1 CAPSULE, EXTENDED RELEASE ORAL DAILY
COMMUNITY
Start: 2025-03-28

## 2025-04-10 NOTE — PROGRESS NOTES
Name: Yajaira Marsh      : 1945      MRN: [id number]  Encounter Provider: Dandre Alva MD  Encounter Date: 4/10/2025   Encounter department: [facility]  :  Assessment & Plan  Abnormal CT of the head  Patient was referred to clinic for abnormal CT Head  There are 3 recent scans in the patient's chart: 3/25/25, 3/06/25, and 25  All do not demonstrate any acute intracranial process  Demonstrate prominent ventricles and microangiopathic changes consistent with age  Patient does note recent fall 2-3 days ago, she was walking with the lights off, stumbled in the dark and struck her head. There is a small hematoma in the R parietoccipital region that is mildly tender to palpation.  Patient denies any dizziness with standing, headache, or weakness  She does endorse some numbness in her feet and she is currently being worked up for B12 deficiency by her PCP, she also has type 2 diabetes and there could be a component of diabetic neuropathy    Plan:  Follow-up in clinic as needed    Orders:    Ambulatory Referral to Neurology      There are no Patient Instructions on file for this visit.     History of Present Illness   HPI   79-year-old female history of HTN, CAD s/p CABG, T2DM, hypothyroidism, history of B12 deficiency, MDD presents the clinic for referral of abnormal CT head.  As detailed above patient has had 3 CT heads in the year of , all of which showed chronic microangiopathic changes with slight prominence of the ventricles consistent with patient's age without any signs of acute intracranial pathology.  When asked patient is not entirely sure why she is here.  At this time she is denying any dizziness, headache, weakness.  She does endorse some numbness in her feet as well as lack of taste.  Per PCPs last note on 2025 patient is currently being worked up for B12 deficiency due to concern for glossitis found on exam.  He does endorse due to this lack  of taste she has lost weight recently approximately 16 pounds unintentionally.  Patient does endorse fall that occurred 2 to 3 days ago in which she had a head strike on her couch on exam she did have a small hematoma on her head but denies any headache, vision changes, weakness, numbness since that time.  Patient does endorse occasional falls approximately 2 within the past 6 months most recent of which, detailed above, she was walking with the lights off and stumbled in the dark.  This fall appears to be mechanical in nature.  The other fall was approximately 3 to 4 months ago she states that she was standing and suddenly fell over to her left.  She does not believe that she struck her head, and she denies any prodrome leading up to this fall.    Review of Systems   Constitutional:  Negative for chills and fever.   HENT:  Negative for ear pain and sore throat.    Eyes:  Negative for pain and visual disturbance.   Respiratory:  Negative for cough and shortness of breath.    Cardiovascular:  Negative for chest pain and palpitations.   Gastrointestinal:  Negative for abdominal pain and vomiting.   Genitourinary:  Negative for dysuria and hematuria.   Musculoskeletal:  Negative for arthralgias and back pain.   Skin:  Negative for color change and rash.   Neurological:  Positive for numbness. Negative for dizziness, tremors, seizures, syncope, facial asymmetry, weakness, light-headedness and headaches.   All other systems reviewed and are negative.   I have personally reviewed the MA's review of systems and made changes as necessary.           Current Outpatient Medications on File Prior to Visit   Medication Sig Dispense Refill    cholecalciferol (VITAMIN D3) 1,000 units tablet Take 2 tablets (2,000 Units total) by mouth daily 60 tablet 1    Continuous Glucose Sensor (Dexcom G7 Sensor) Use 1 Device every 10 days 3 each 5    cyanocobalamin (VITAMIN B-12) 1000 MCG tablet Take 1 tablet (1,000 mcg total) by mouth daily 30  tablet 1    diltiazem (CARDIZEM CD) 120 mg 24 hr capsule Take 1 capsule (120 mg total) by mouth daily 30 capsule 1    diltiazem (TIAZAC) 120 MG 24 hr capsule Take 1 capsule by mouth in the morning      insulin glargine (LANTUS) 100 units/mL subcutaneous injection Inject 7 Units under the skin daily at bedtime 10 mL 0    isosorbide mononitrate (IMDUR) 30 mg 24 hr tablet Take 1 tablet (30 mg total) by mouth daily 30 tablet 1    levothyroxine 75 mcg tablet Take 1 tablet (75 mcg total) by mouth daily 30 tablet 1    pravastatin (PRAVACHOL) 40 mg tablet Take 1 tablet (40 mg total) by mouth daily with dinner 30 tablet 1    venlafaxine (EFFEXOR-XR) 75 mg 24 hr capsule Take 1 capsule (75 mg total) by mouth daily 30 capsule 1    aspirin 81 mg chewable tablet Chew 1 tablet (81 mg total) daily (Patient not taking: Reported on 4/10/2025) 30 tablet 1    fluticasone (FLONASE) 50 mcg/act nasal spray 2 sprays into each nostril daily at bedtime as needed for rhinitis (Patient not taking: Reported on 4/10/2025) 1 g 0    magnesium Oxide (MAG-OX) 400 mg TABS Take 2 tablets (800 mg total) by mouth 2 (two) times a day (Patient not taking: Reported on 4/10/2025) 120 tablet 1    melatonin 3 mg Take 1 tablet (3 mg total) by mouth daily at bedtime (Patient not taking: Reported on 4/10/2025) 30 tablet 1    pantoprazole (PROTONIX) 40 mg tablet Take 1 tablet (40 mg total) by mouth daily (Patient not taking: Reported on 4/10/2025) 30 tablet 1    traZODone (DESYREL) 50 mg tablet Take 1 tablet (50 mg total) by mouth daily at bedtime (Patient not taking: Reported on 4/10/2025) 30 tablet 1     No current facility-administered medications on file prior to visit.      Social History     Tobacco Use    Smoking status: Former     Current packs/day: 0.00     Average packs/day: 1 pack/day for 6.0 years (6.0 ttl pk-yrs)     Types: Cigarettes     Start date:      Quit date:      Years since quittin.3     Passive exposure: Never    Smokeless  "tobacco: Never    Tobacco comments:     Smoked about a half a pack for about 4 yrs.  Quite about 50 yrs ago.   Vaping Use    Vaping status: Never Used   Substance and Sexual Activity    Alcohol use: Never    Drug use: No    Sexual activity: Not Currently     Comment: No known STD risk factors        Objective   /64 (BP Location: Left arm, Patient Position: Sitting, Cuff Size: Extra-Large)   Pulse 93   Ht 5' 1\" (1.549 m)   Wt 74.4 kg (164 lb)   SpO2 96%   BMI 30.99 kg/m²     Physical Exam  Constitutional:       Appearance: Normal appearance.   HENT:      Head: Normocephalic and atraumatic.      Comments: Bright red tongue with prominent fissures concerning for glossitis     Mouth/Throat:      Mouth: Mucous membranes are moist.      Pharynx: Oropharynx is clear.   Eyes:      Extraocular Movements: Extraocular movements intact.      Conjunctiva/sclera: Conjunctivae normal.      Pupils: Pupils are equal, round, and reactive to light.   Cardiovascular:      Rate and Rhythm: Normal rate.      Pulses: Normal pulses.   Pulmonary:      Effort: Pulmonary effort is normal.   Musculoskeletal:         General: No swelling or signs of injury. Normal range of motion.      Cervical back: Normal range of motion. No rigidity.      Right lower leg: No edema.      Left lower leg: No edema.   Neurological:      General: No focal deficit present.      Mental Status: She is alert and oriented to person, place, and time.      Cranial Nerves: No cranial nerve deficit.      Sensory: No sensory deficit.      Motor: No weakness.      Coordination: Romberg sign negative. Coordination normal.      Gait: Gait normal.      Deep Tendon Reflexes: Reflexes normal.      Reflex Scores:       Tricep reflexes are 2+ on the right side and 2+ on the left side.       Bicep reflexes are 2+ on the right side and 2+ on the left side.       Brachioradialis reflexes are 2+ on the right side and 2+ on the left side.       Patellar reflexes are 2+ on " the right side and 2+ on the left side.  Psychiatric:         Mood and Affect: Mood normal.         Behavior: Behavior normal.       Neurological Exam  Mental Status  Alert. Oriented to person, place, and time. Language is fluent with no aphasia. Attention and concentration are normal.    Cranial Nerves  CN II: Visual acuity is normal. Visual fields full to confrontation.  CN III, IV, VI: Extraocular movements intact bilaterally. Pupils equal round and reactive to light bilaterally.  CN V: Facial sensation is normal.  CN VII: Full and symmetric facial movement.  CN VIII: Hearing is normal.  CN IX, X: Palate elevates symmetrically  CN XI: Shoulder shrug strength is normal.  CN XII: Tongue midline without atrophy or fasciculations.    Motor  Normal muscle bulk throughout. No fasciculations present. Normal muscle tone.                                               Right                     Left   Shoulder abduction               5                          5  Elbow flexion                         5                          5  Elbow extension                    5                          5  Hip flexion                              5                          5  Knee flexion                           5                          5  Knee extension                      5                          5  Plantarflexion                         5                          5  Dorsiflexion                            5                          5    Sensory  Light touch is normal in upper and lower extremities. Temperature abnormality: Decrease temperature sensation below the knee. Decreased vibratory sensation below the ankle.     Reflexes                                            Right                      Left  Brachioradialis                    2+                         2+  Biceps                                 2+                         2+  Triceps                                2+                         2+  Patellar                                 2+                         2+    Coordination  Right: Finger-to-nose normal. Rapid alternating movement normal. Heel-to-shin normal.Left: Finger-to-nose normal. Rapid alternating movement normal. Heel-to-shin normal.    Gait   Normal gait.Casual gait is normal including stance, stride, and arm swing. Normal heel walking. Romberg is absent. Able to rise from chair without using arms.      Radiology Results Review: I have reviewed radiology reports from PACS including: CT head, CT C-spine, and MRI spine.

## 2025-04-15 DIAGNOSIS — Z79.4 TYPE 2 DIABETES MELLITUS WITH HYPERGLYCEMIA, WITH LONG-TERM CURRENT USE OF INSULIN (HCC): Primary | ICD-10-CM

## 2025-04-15 DIAGNOSIS — E11.65 TYPE 2 DIABETES MELLITUS WITH HYPERGLYCEMIA, WITH LONG-TERM CURRENT USE OF INSULIN (HCC): Primary | ICD-10-CM

## 2025-04-15 RX ORDER — INSULIN GLARGINE 100 [IU]/ML
INJECTION, SOLUTION SUBCUTANEOUS
Qty: 15 ML | Refills: 1 | Status: SHIPPED | OUTPATIENT
Start: 2025-04-15

## 2025-04-16 ENCOUNTER — TELEPHONE (OUTPATIENT)
Age: 80
End: 2025-04-16

## 2025-04-16 NOTE — TELEPHONE ENCOUNTER
Patient calls to confirm upcoming appointment times.    Also patient asks if there is anything else to use other than Flonase, something not as strong? Patient is concerned Flonase will cause nosebleed.     Please review.

## 2025-04-22 ENCOUNTER — TELEPHONE (OUTPATIENT)
Dept: GASTROENTEROLOGY | Facility: AMBULARY SURGERY CENTER | Age: 80
End: 2025-04-22

## 2025-04-22 NOTE — TELEPHONE ENCOUNTER
Spoke w/ pt, did not want to jayden colon at this time.  However, pt did jayden OV for EGD consult.  Pt is scheduled 7/8/2025.

## 2025-04-22 NOTE — TELEPHONE ENCOUNTER
----- Message from Sherrill RITCHIE sent at 4/21/2025 11:35 AM EDT -----  Regarding: reschedule colon w/ Dr. Carrasco  Please reach out to patient to reschedule colon with Dr. Carrasco    Thank you

## 2025-04-23 ENCOUNTER — NURSE TRIAGE (OUTPATIENT)
Age: 80
End: 2025-04-23

## 2025-04-23 ENCOUNTER — TELEPHONE (OUTPATIENT)
Dept: PSYCHIATRY | Facility: CLINIC | Age: 80
End: 2025-04-23

## 2025-04-23 ENCOUNTER — APPOINTMENT (EMERGENCY)
Dept: RADIOLOGY | Facility: HOSPITAL | Age: 80
End: 2025-04-23
Payer: COMMERCIAL

## 2025-04-23 ENCOUNTER — HOSPITAL ENCOUNTER (EMERGENCY)
Facility: HOSPITAL | Age: 80
Discharge: HOME | End: 2025-04-23
Attending: EMERGENCY MEDICINE
Payer: COMMERCIAL

## 2025-04-23 ENCOUNTER — TELEPHONE (OUTPATIENT)
Age: 80
End: 2025-04-23

## 2025-04-23 ENCOUNTER — APPOINTMENT (EMERGENCY)
Dept: CT IMAGING | Facility: HOSPITAL | Age: 80
End: 2025-04-23
Payer: COMMERCIAL

## 2025-04-23 VITALS
OXYGEN SATURATION: 98 % | HEART RATE: 86 BPM | SYSTOLIC BLOOD PRESSURE: 145 MMHG | DIASTOLIC BLOOD PRESSURE: 66 MMHG | RESPIRATION RATE: 20 BRPM | TEMPERATURE: 98.2 F

## 2025-04-23 DIAGNOSIS — R06.02 SHORTNESS OF BREATH: Primary | ICD-10-CM

## 2025-04-23 DIAGNOSIS — F41.9 ANXIETY: ICD-10-CM

## 2025-04-23 LAB
2HR DELTA HS TROPONIN: -1 NG/L
ALBUMIN SERPL BCG-MCNC: 3.9 G/DL (ref 3.5–5)
ALP SERPL-CCNC: 88 U/L (ref 34–104)
ALT SERPL W P-5'-P-CCNC: 15 U/L (ref 7–52)
ANION GAP SERPL CALCULATED.3IONS-SCNC: 15 MMOL/L (ref 4–13)
AST SERPL W P-5'-P-CCNC: 20 U/L (ref 13–39)
BASE EXCESS BLDA CALC-SCNC: -4 MMOL/L (ref -2–3)
BASOPHILS # BLD AUTO: 0.04 THOUSANDS/ÂΜL (ref 0–0.1)
BASOPHILS NFR BLD AUTO: 0 % (ref 0–1)
BILIRUB SERPL-MCNC: 0.71 MG/DL (ref 0.2–1)
BUN SERPL-MCNC: 16 MG/DL (ref 5–25)
CA-I BLD-SCNC: 1.11 MMOL/L (ref 1.12–1.32)
CALCIUM SERPL-MCNC: 10.2 MG/DL (ref 8.4–10.2)
CARDIAC TROPONIN I PNL SERPL HS: 12 NG/L (ref ?–50)
CARDIAC TROPONIN I PNL SERPL HS: 13 NG/L (ref ?–50)
CHLORIDE SERPL-SCNC: 105 MMOL/L (ref 96–108)
CO2 SERPL-SCNC: 20 MMOL/L (ref 21–32)
CREAT SERPL-MCNC: 1.44 MG/DL (ref 0.6–1.3)
D DIMER PPP FEU-MCNC: 1.51 UG/ML FEU
EOSINOPHIL # BLD AUTO: 0.01 THOUSAND/ÂΜL (ref 0–0.61)
EOSINOPHIL NFR BLD AUTO: 0 % (ref 0–6)
ERYTHROCYTE [DISTWIDTH] IN BLOOD BY AUTOMATED COUNT: 14.4 % (ref 11.6–15.1)
FLUAV AG UPPER RESP QL IA.RAPID: NEGATIVE
FLUBV AG UPPER RESP QL IA.RAPID: NEGATIVE
GFR SERPL CREATININE-BSD FRML MDRD: 34 ML/MIN/1.73SQ M
GLUCOSE SERPL-MCNC: 159 MG/DL (ref 65–140)
GLUCOSE SERPL-MCNC: 193 MG/DL (ref 65–140)
HCO3 BLDA-SCNC: 16.2 MMOL/L (ref 24–30)
HCT VFR BLD AUTO: 39.1 % (ref 34.8–46.1)
HCT VFR BLD CALC: 31 % (ref 34.8–46.1)
HGB BLD-MCNC: 12.7 G/DL (ref 11.5–15.4)
HGB BLDA-MCNC: 10.5 G/DL (ref 11.5–15.4)
IMM GRANULOCYTES # BLD AUTO: 0.01 THOUSAND/UL (ref 0–0.2)
IMM GRANULOCYTES NFR BLD AUTO: 0 % (ref 0–2)
LYMPHOCYTES # BLD AUTO: 2.45 THOUSANDS/ÂΜL (ref 0.6–4.47)
LYMPHOCYTES NFR BLD AUTO: 27 % (ref 14–44)
MCH RBC QN AUTO: 27.7 PG (ref 26.8–34.3)
MCHC RBC AUTO-ENTMCNC: 32.5 G/DL (ref 31.4–37.4)
MCV RBC AUTO: 85 FL (ref 82–98)
MONOCYTES # BLD AUTO: 0.68 THOUSAND/ÂΜL (ref 0.17–1.22)
MONOCYTES NFR BLD AUTO: 8 % (ref 4–12)
NEUTROPHILS # BLD AUTO: 5.8 THOUSANDS/ÂΜL (ref 1.85–7.62)
NEUTS SEG NFR BLD AUTO: 65 % (ref 43–75)
NRBC BLD AUTO-RTO: 0 /100 WBCS
PCO2 BLD: 17 MMOL/L (ref 21–32)
PCO2 BLD: 18.1 MM HG (ref 42–50)
PH BLD: 7.56 [PH] (ref 7.3–7.4)
PLATELET # BLD AUTO: 234 THOUSANDS/UL (ref 149–390)
PMV BLD AUTO: 11.9 FL (ref 8.9–12.7)
PO2 BLD: 34 MM HG (ref 35–45)
POTASSIUM BLD-SCNC: 3.3 MMOL/L (ref 3.5–5.3)
POTASSIUM SERPL-SCNC: 4.1 MMOL/L (ref 3.5–5.3)
PROT SERPL-MCNC: 7.7 G/DL (ref 6.4–8.4)
RBC # BLD AUTO: 4.59 MILLION/UL (ref 3.81–5.12)
SAO2 % BLD FROM PO2: 76 % (ref 60–85)
SARS-COV+SARS-COV-2 AG RESP QL IA.RAPID: NEGATIVE
SODIUM BLD-SCNC: 140 MMOL/L (ref 136–145)
SODIUM SERPL-SCNC: 140 MMOL/L (ref 135–147)
SPECIMEN SOURCE: ABNORMAL
WBC # BLD AUTO: 8.99 THOUSAND/UL (ref 4.31–10.16)

## 2025-04-23 PROCEDURE — 85025 COMPLETE CBC W/AUTO DIFF WBC: CPT

## 2025-04-23 PROCEDURE — 99285 EMERGENCY DEPT VISIT HI MDM: CPT

## 2025-04-23 PROCEDURE — 96360 HYDRATION IV INFUSION INIT: CPT

## 2025-04-23 PROCEDURE — 99285 EMERGENCY DEPT VISIT HI MDM: CPT | Performed by: EMERGENCY MEDICINE

## 2025-04-23 PROCEDURE — 82947 ASSAY GLUCOSE BLOOD QUANT: CPT

## 2025-04-23 PROCEDURE — 93005 ELECTROCARDIOGRAM TRACING: CPT

## 2025-04-23 PROCEDURE — 36415 COLL VENOUS BLD VENIPUNCTURE: CPT

## 2025-04-23 PROCEDURE — 84295 ASSAY OF SERUM SODIUM: CPT

## 2025-04-23 PROCEDURE — 85379 FIBRIN DEGRADATION QUANT: CPT

## 2025-04-23 PROCEDURE — 71046 X-RAY EXAM CHEST 2 VIEWS: CPT

## 2025-04-23 PROCEDURE — 87804 INFLUENZA ASSAY W/OPTIC: CPT

## 2025-04-23 PROCEDURE — 80053 COMPREHEN METABOLIC PANEL: CPT

## 2025-04-23 PROCEDURE — 84484 ASSAY OF TROPONIN QUANT: CPT

## 2025-04-23 PROCEDURE — 82330 ASSAY OF CALCIUM: CPT

## 2025-04-23 PROCEDURE — 87811 SARS-COV-2 COVID19 W/OPTIC: CPT

## 2025-04-23 PROCEDURE — 85014 HEMATOCRIT: CPT

## 2025-04-23 PROCEDURE — 84132 ASSAY OF SERUM POTASSIUM: CPT

## 2025-04-23 PROCEDURE — 82803 BLOOD GASES ANY COMBINATION: CPT

## 2025-04-23 PROCEDURE — 71275 CT ANGIOGRAPHY CHEST: CPT

## 2025-04-23 RX ADMIN — SODIUM CHLORIDE 500 ML: 0.9 INJECTION, SOLUTION INTRAVENOUS at 20:36

## 2025-04-23 RX ADMIN — IOHEXOL 85 ML: 350 INJECTION, SOLUTION INTRAVENOUS at 20:25

## 2025-04-23 NOTE — TELEPHONE ENCOUNTER
Pt declining ED states she just thinks its anxiety, Pt made aware that if she is to get worse to go to the ED. Pt understood. PT declines appointment tomorrow 4/24 states she has a lot of appointments. Same day used for Friday morning with Provider. Sending as an FYI

## 2025-04-23 NOTE — ED NOTES
Pt refusing IV and blood work during triage.     Sandie Javier RN  04/23/25 [id number]       Sandie Javier RN  04/23/25 [id number]

## 2025-04-23 NOTE — TELEPHONE ENCOUNTER
Patient called in and shared that after speaking with her daughter she will be keeping NP MM appt 4/24 @ 8am.     Patient also shared she doesn't know how she will make it to appt.

## 2025-04-23 NOTE — TELEPHONE ENCOUNTER
"Reason for Disposition   MODERATE difficulty breathing (e.g., speaks in phrases, SOB even at rest, pulse 100-120) of new-onset or worse than normal    Answer Assessment - Initial Assessment Questions  1. RESPIRATORY STATUS: \"Describe your breathing?\" (e.g., wheezing, shortness of breath, unable to speak, severe coughing)       Sob of breathing   2. ONSET: \"When did this breathing problem begin?\"       Patient states when she gets upset symptoms started since being discharged from the hospital .progressively gotten worse today    3. PATTERN \"Does the difficult breathing come and go, or has it been constant since it started?\"       COME AND GOES   4. SEVERITY: \"How bad is your breathing?\" (e.g., mild, moderate, severe)       MODERATE   5. RECURRENT SYMPTOM: \"Have you had difficulty breathing before?\" If Yes, ask: \"When was the last time?\" and \"What happened that time?\"       YES ,patient states she very anxious   6. CARDIAC HISTORY: \"Do you have any history of heart disease?\" (e.g., heart attack, angina, bypass surgery, angioplasty)       CAD  7. LUNG HISTORY: \"Do you have any history of lung disease?\"  (e.g., pulmonary embolus, asthma, emphysema)        8. CAUSE: \"What do you think is causing the breathing problem?\"       Patient states anxiety   9. OTHER SYMPTOMS: \"Do you have any other symptoms?\" (e.g., chest pain, cough, dizziness, fever, runny nose)      Patient denies ,cough,dizziness,fever   Patient c/o PND and chest pain that radiates to her back   10. O2 SATURATION MONITOR:  \"Do you use an oxygen saturation monitor (pulse oximeter) at home?\" If Yes, ask: \"What is your reading (oxygen level) today?\" \"What is your usual oxygen saturation reading?\" (e.g., 95%)        NO   11. PREGNANCY: \"Is there any chance you are pregnant?\" \"When was your last menstrual period?\"        No   12. TRAVEL: \"Have you traveled out of the country in the last month?\" (e.g., travel history, exposures)        No  Patient stated she is " very upset with all her bills piling up since being discahrged from the hospital   Advised ER evaluation ,patient declined    Protocols used: Breathing Difficulty-Adult-[place]  FOLLOW UP: declined er evaluation     REASON FOR CONVERSATION: Shortness of Breath    SYMPTOMS: sob,anxiety chest pain that radiates to the back,very anxious on the phone,symptoms started  since being discharged from the hospital .progressively gotten worse today        OTHER: Patient stated she is very upset with all her bills piling up since being discharged  from the hospital,      DISPOSITION: Go to ED Now  Declined an ER evaluation

## 2025-04-23 NOTE — TELEPHONE ENCOUNTER
Connected with patient via access.    Patient was looking to receive directions to the office. This writer tried their best to navigate patient to office using surrounding businesses and buildings.    Patient felt they had an idea of the general location and would be able to find the office.

## 2025-04-23 NOTE — TELEPHONE ENCOUNTER
Patient calling in requesting to have new patient MM appointment rescheduled.     Patient does not feel confident she can make it into the office at 8am tomorrow.    Please contact patient @[phone] (Home)

## 2025-04-24 ENCOUNTER — OFFICE VISIT (OUTPATIENT)
Dept: PSYCHIATRY | Facility: CLINIC | Age: 80
End: 2025-04-24

## 2025-04-24 DIAGNOSIS — Z91.199 NO-SHOW FOR APPOINTMENT: Primary | ICD-10-CM

## 2025-04-24 LAB
ATRIAL RATE: 92 BPM
P AXIS: 73 DEGREES
PR INTERVAL: 150 MS
QRS AXIS: -51 DEGREES
QRSD INTERVAL: 88 MS
QT INTERVAL: 364 MS
QTC INTERVAL: 450 MS
T WAVE AXIS: 58 DEGREES
VENTRICULAR RATE: 92 BPM

## 2025-04-24 PROCEDURE — 93010 ELECTROCARDIOGRAM REPORT: CPT | Performed by: INTERNAL MEDICINE

## 2025-04-24 PROCEDURE — NOSHOW

## 2025-04-24 NOTE — PSYCH
Yajaira Marsh did not attend 04/24/25 appointment. This writer allowed for 30 minute karen period prior to marking appointment as no show. Staff will provide appropriate outreach regarding absence, reiterating clinic policy, and inquiring regarding rescheduling.     No Call. No Show. No Charge    Treatment Plan not completed within required time limits due to: Yajaira Marsh no show appointment on 04/24/25     Silvino Paul Castano MD

## 2025-04-24 NOTE — ED PROVIDER NOTES
Time reflects when diagnosis was documented in both MDM as applicable and the Disposition within this note       Time User Action Codes Description Comment    4/23/2025  9:31 PM Juana Kwong [R06.02] Shortness of breath     4/23/2025 10:19 PM Juana Kwong [F41.9] Anxiety           ED Disposition       ED Disposition   Discharge    Condition   Stable    Date/Time   Wed Apr 23, 2025 10:19 PM    Comment   Yajaira Marsh discharge to home/self care.                   Assessment & Plan       Medical Decision Making  Amount and/or Complexity of Data Reviewed  Labs: ordered.  Radiology: ordered.    Risk  Prescription drug management.      Yajaira Mrash is a 79 year old female PMH DVT, CAD, T2DM and generalized anxiety presents to the ED for shortness of breath.   Differential includes anxiety, pneumonia, PE.  ABC unremarkable.  CMP with decreased bicarb and creatinine and that is at baseline.  Flu COVID negative and unremarkable chest x-ray with lung sounds.  Patient's decreased bicarb prompting i-STAT .  Patient with respiratory alkalosis likely secondary to anxiety that is causing tachypnea.  Patient's D-dimer elevated so CTA chest PE study obtained which shows no acute findings.  Throughout the stay, patient without tachypnea when entering from which gradually worsened throughout conversation and interaction.  Overall patient states that patient she feels better and would like to go home to see her dog.    Dispo: discharge to home  Prescriptions: None    Discussed results and plan with patient. Patient was agreeable and expressed understanding. Discussed return precautions.        ED Course as of 04/23/25 9465   Wed Apr 23, 2025 2128 /66       Medications   sodium chloride 0.9 % bolus 500 mL (0 mL Intravenous Stopped 4/23/25 2136)   iohexol (OMNIPAQUE) 350 MG/ML injection (MULTI-DOSE) 85 mL (85 mL Intravenous Given 4/23/25 2025)       ED Risk Strat Scores        No data recorded                   History of Present Illness       Chief Complaint   Patient presents with    Shortness of Breath     Pt c/o SOB onset today. Concerned about pneumonia.        Past Medical History:   Diagnosis Date    Acute embolism and thrombosis of unspecified deep veins of unspecified lower extremity ([facility])     Last Assessed:  5/18/17    Anemia     Anosmia     Anxiety     Arthritis     Asthma     Back pain     Bilateral macular retinal edema     CAD (coronary artery disease)     Cataract     Cervical disc herniation     Cervical radiculopathy     Cervical spinal stenosis     Cervical spondylolysis     Chronic kidney disease     Chronic mastoiditis     Colon polyp     Complex endometrial hyperplasia     Depression     Diabetes mellitus (HCC)     Disease of thyroid gland     DVT (deep venous thrombosis) ([facility])     DVT (deep venous thrombosis) ([facility]) 06/16/2017    Fibromyalgia     Fibromyalgia, primary     Hyperlipidemia     Hypertension     Hypothyroid     Kidney stone     Lumbar radiculopathy     Neck pain     Obese     PONV (postoperative nausea and vomiting)     Shingles 07/01/2021    Spinal stenosis     Stomach ulcer     Thyroid disease     Toxic metabolic encephalopathy 02/11/2025    Vascular claudication ([facility]) 12/11/2017      Past Surgical History:   Procedure Laterality Date    BACK SURGERY      CARPAL TUNNEL RELEASE      CATARACT EXTRACTION      CHOLECYSTECTOMY      COLONOSCOPY      CORONARY ANGIOPLASTY      CORONARY ARTERY BYPASS GRAFT      CYSTOSCOPY N/A 6/20/2017    Procedure: CYSTOSCOPY;  Surgeon: Tacho Arnold MD;  Location: [facility];  Service: Gynecology Oncology    CYSTOSCOPY      ID LAPS TOTAL HYSTERECT 250 GM/< W/RMVL TUBE/OVARY N/A 6/20/2017    Procedure: ROBOTIC HYSTERECTOMY; BILATERAL SALPINO-OOPHERECTOMY; umbilical hernia repair.;  Surgeon: Tacho Arnold MD;  Location: [facility];  Service: Gynecology Oncology    ID REPAIR FIRST ABDOMINAL WALL HERNIA N/A 5/12/2022    Procedure: REPAIR HERNIA  INCISIONAL;  Surgeon: Horacio Scherer DO;  Location: [facility];  Service: General    TONSILECTOMY AND ADNOIDECTOMY      TONSILLECTOMY      UMBILICAL HERNIA REPAIR        Family History   Problem Relation Age of Onset    Arthritis Mother     Leukemia Mother     Other Mother         Anxiety, major depressive disorder, recurrent episode with atypical features    Coronary artery disease Father         Heart problem    Diabetes Father     Other Father         Infectious disease    Alzheimer's disease Maternal Grandmother     Other Maternal Grandfather         Heart problem    Other Daughter         Anxiety, major depressive disorder, recurrent episode with atypical features    Alcohol abuse Other         Grandparent    Cancer Family     Diabetes Family     Hypertension Family     Other Family         Reported prior back trouble, thyroid disorder      Social History     Tobacco Use    Smoking status: Former     Current packs/day: 0.00     Average packs/day: 1 pack/day for 6.0 years (6.0 ttl pk-yrs)     Types: Cigarettes     Start date:      Quit date:      Years since quittin.3     Passive exposure: Never    Smokeless tobacco: Never    Tobacco comments:     Smoked about a half a pack for about 4 yrs.  Quite about 50 yrs ago.   Vaping Use    Vaping status: Never Used   Substance Use Topics    Alcohol use: Never    Drug use: No      E-Cigarette/Vaping    E-Cigarette Use Never User       E-Cigarette/Vaping Substances    Nicotine No     THC No     CBD No     Flavoring No     Other No     Unknown No       I have reviewed and agree with the history as documented.     HPI    Yajaira Marsh is a 79 year old female PMH DVT, CAD, T2DM and generalized anxiety presents to the ED for shortness of breath.  Patient states she has had intermittent shortness of breath for the last month since leaving the behavioral health facility that she was at for anxiety.  She states that normally when she is anxious, she gets short  of breath like this.  She was going to see her primary care doctor, but they had no appointments for today prompting ED evaluation.  He has also had some nasal congestion over the past couple days.  She denies any exertional chest pain, orthopnea, nausea, vomiting, fever, cough, abdominal pain, dysuria.   Patient states that she was prescribed Effexor which she has not started.  She also takes her medications intermittently since many of them make her feel bad.  This is corroborated by daughter who is bedside.    Review of Systems   Constitutional:  Negative for chills and fever.   HENT:  Positive for congestion and rhinorrhea.    Eyes:  Negative for visual disturbance.   Respiratory:  Positive for shortness of breath. Negative for cough.    Cardiovascular:  Negative for chest pain and leg swelling.   Gastrointestinal:  Negative for abdominal pain, nausea and vomiting.   Genitourinary:  Negative for dysuria and hematuria.   Musculoskeletal:  Negative for back pain and neck pain.   Skin:  Negative for wound.   Neurological:  Negative for dizziness, light-headedness and headaches.           Objective       ED Triage Vitals [04/23/25 1811]   Temperature Pulse Blood Pressure Respirations SpO2 Patient Position - Orthostatic VS   98.2 °F (36.8 °C) 95 (!) 180/86 20 99 % Sitting      Temp Source Heart Rate Source BP Location FiO2 (%) Pain Score    Oral Monitor Right arm -- --      Vitals      Date and Time Temp Pulse SpO2 Resp BP Pain Score FACES Pain Rating User   04/23/25 2129 -- 86 98 % -- 145/66 -- -- AF   04/23/25 1811 98.2 °F (36.8 °C) 95 99 % 20 180/86 -- -- DB            Physical Exam  Constitutional:       General: She is not in acute distress.  HENT:      Head: Normocephalic and atraumatic.      Mouth/Throat:      Mouth: Mucous membranes are moist.      Pharynx: Oropharynx is clear.   Eyes:      Extraocular Movements: Extraocular movements intact.      Conjunctiva/sclera: Conjunctivae normal.   Cardiovascular:       Rate and Rhythm: Normal rate and regular rhythm.      Pulses: Normal pulses.      Heart sounds: Normal heart sounds.   Pulmonary:      Effort: Pulmonary effort is normal. Tachypnea present. No respiratory distress.      Breath sounds: Normal breath sounds.   Abdominal:      Palpations: Abdomen is soft.      Tenderness: There is no abdominal tenderness.   Musculoskeletal:         General: No tenderness or deformity. Normal range of motion.      Cervical back: Normal range of motion. No rigidity.   Skin:     General: Skin is warm and dry.      Capillary Refill: Capillary refill takes less than 2 seconds.   Neurological:      General: No focal deficit present.      Mental Status: She is alert and oriented to person, place, and time. Mental status is at baseline.         Results Reviewed       Procedure Component Value Units Date/Time    HS Troponin I 2hr [[id number]]  (Normal) Collected: 04/23/25 2203    Lab Status: Final result Specimen: Blood Updated: 04/23/25 2217     hs TnI 2hr 12 ng/L      Delta 2hr hsTnI -1 ng/L     POCT Blood Gas (CG8+) [[id number]]  (Abnormal) Collected: 04/23/25 2038    Lab Status: Final result Specimen: Venous Updated: 04/23/25 2042     ph, Dae ISTAT 7.558     pCO2, Dae i-STAT 18.1 mm HG      pO2, Dae i-STAT 34.0 mm HG      BE, i-STAT -4 mmol/L      HCO3, Dae i-STAT 16.2 mmol/L      CO2, i-STAT 17 mmol/L      O2 Sat, i-STAT 76 %      SODIUM, I-STAT 140 mmol/l      Potassium, i-STAT 3.3 mmol/L      Calcium, Ionized i-STAT 1.11 mmol/L      Hct, i-STAT 31 %      Hgb, i-STAT 10.5 g/dl      Glucose, i-STAT 159 mg/dl      Specimen Type VENOUS    HS Troponin I 4hr [[id number]]     Lab Status: No result Specimen: Blood     HS Troponin 0hr (reflex protocol) [[id number]]  (Normal) Collected: 04/23/25 1937    Lab Status: Final result Specimen: Blood from Arm, Right Updated: 04/23/25 2013     hs TnI 0hr 13 ng/L     D-dimer, quantitative [[id number]]  (Abnormal) Collected: 04/23/25 1937    Lab Status: Final  result Specimen: Blood from Arm, Right Updated: 04/23/25 2006     D-Dimer, Quant 1.51 ug/ml FEU     Narrative:      In the evaluation for possible pulmonary embolism, in the appropriate (Well's Score of 4 or less) patient, the age adjusted d-dimer cutoff for this patient can be calculated as:    Age x 0.01 (in ug/mL) for Age-adjusted D-dimer exclusion threshold for a patient over 50 years.    Comprehensive metabolic panel [[id number]]  (Abnormal) Collected: 04/23/25 1937    Lab Status: Final result Specimen: Blood from Arm, Right Updated: 04/23/25 2005     Sodium 140 mmol/L      Potassium 4.1 mmol/L      Chloride 105 mmol/L      CO2 20 mmol/L      ANION GAP 15 mmol/L      BUN 16 mg/dL      Creatinine 1.44 mg/dL      Glucose 193 mg/dL      Calcium 10.2 mg/dL      AST 20 U/L      ALT 15 U/L      Alkaline Phosphatase 88 U/L      Total Protein 7.7 g/dL      Albumin 3.9 g/dL      Total Bilirubin 0.71 mg/dL      eGFR 34 ml/min/1.73sq m     Narrative:      National Kid[employer] Disease Foundation guidelines for Chronic Kidney Disease (CKD):     Stage 1 with normal or high GFR (GFR > 90 mL/min/1.73 square meters)    Stage 2 Mild CKD (GFR = 60-89 mL/min/1.73 square meters)    Stage 3A Moderate CKD (GFR = 45-59 mL/min/1.73 square meters)    Stage 3B Moderate CKD (GFR = 30-44 mL/min/1.73 square meters)    Stage 4 Severe CKD (GFR = 15-29 mL/min/1.73 square meters)    Stage 5 End Stage CKD (GFR <15 mL/min/1.73 square meters)  Note: GFR calculation is accurate only with a steady state creatinine    FLU/COVID Rapid Antigen (30 min. TAT) - Preferred screening test in ED [[id number]]  (Normal) Collected: 04/23/25 1937    Lab Status: Final result Specimen: Nares from Nose Updated: 04/23/25 2003     SARS COV Rapid Antigen Negative     Influenza A Rapid Antigen Negative     Influenza B Rapid Antigen Negative    Narrative:      This test has been performed using the [employer] [facility] 2 FLU+SARS Antigen test under the Emergency Use Authorization  (EUA). This test has been validated by the  and verified by the performing laboratory. The [place] uses lateral flow immunofluorescent sandwich assay to detect SARS-COV, Influenza A and Influenza B Antigen.     The [employer] [place] 2 SARS Antigen test does not differentiate between SARS-CoV and SARS-CoV-2.     Negative results are presumptive and may be confirmed with a molecular assay, if necessary, for patient management. Negative results do not rule out SARS-CoV-2 or influenza infection and should not be used as the sole basis for treatment or patient management decisions. A negative test result may occur if the level of antigen in a sample is below the limit of detection of this test.     Positive results are indicative of the presence of viral antigens, but do not rule out bacterial infection or co-infection with other viruses.     All test results should be used as an adjunct to clinical observations and other information available to the provider.    FOR PEDIATRIC PATIENTS - copy/paste COVID Guidelines URL to browser: [email or web address]    CBC and differential [[id number]] Collected: 04/23/25 1937    Lab Status: Final result Specimen: Blood from Arm, Right Updated: 04/23/25 1950     WBC 8.99 Thousand/uL      RBC 4.59 Million/uL      Hemoglobin 12.7 g/dL      Hematocrit 39.1 %      MCV 85 fL      MCH 27.7 pg      MCHC 32.5 g/dL      RDW 14.4 %      MPV 11.9 fL      Platelets 234 Thousands/uL      nRBC 0 /100 WBCs      Segmented % 65 %      Immature Grans % 0 %      Lymphocytes % 27 %      Monocytes % 8 %      Eosinophils Relative 0 %      Basophils Relative 0 %      Absolute Neutrophils 5.80 Thousands/µL      Absolute Immature Grans 0.01 Thousand/uL      Absolute Lymphocytes 2.45 Thousands/µL      Absolute Monocytes 0.68 Thousand/µL      Eosinophils Absolute 0.01 Thousand/µL      Basophils Absolute 0.04 Thousands/µL             CTA chest pe study    ED Interpretation by Juana Kwong MD (04/23 2115)   FINDINGS:     PULMONARY ARTERIAL TREE:  No pulmonary embolus.        LUNGS: 7 mm right middle lobe nodule which has been stable since at least 2017 compatible with a benign etiology.     PLEURA: Unremarkable.     HEART/GREAT VESSELS: CABG no thoracic aortic aneurysm.     MEDIASTINUM AND SAMANTHA: Unremarkable.     CHEST WALL AND LOWER NECK: Unremarkable.     VISUALIZED STRUCTURES IN THE UPPER ABDOMEN: Unremarkable.     OSSEOUS STRUCTURES: Spinal degenerative changes are noted. No acute fracture or destructive osseous lesion.     IMPRESSION:     Negative for acute pulmonary thromboembolism. No acute findings in the chest.                 Workstation performed: [id number]        Final Interpretation by Rosie Patten MD (04/23 2113)      Negative for acute pulmonary thromboembolism. No acute findings in the chest.                  Workstation performed: [id number]         XR chest 2 views    (Results Pending)       ECG 12 Lead Documentation Only    Date/Time: 4/23/2025 6:20 PM    Performed by: Juana Kwong MD  Authorized by: Juana Kwong MD    Indications / Diagnosis:  SOB  ECG reviewed by me, the ED Provider: yes    Patient location:  ED  Previous ECG:     Previous ECG:  Compared to current    Similarity:  No change    Comparison to cardiac monitor: Yes    Interpretation:     Interpretation: normal    Rate:     ECG rate:  92    ECG rate assessment: normal    Rhythm:     Rhythm: sinus rhythm    Ectopy:     Ectopy: none    QRS:     QRS axis:  Left    QRS intervals:  Normal  Conduction:     Conduction: normal    ST segments:     ST segments:  Normal  T waves:     T waves: normal        ED Medication and Procedure Management   Prior to Admission Medications   Prescriptions Last Dose Informant Patient Reported? Taking?   Continuous Glucose Sensor (Dexcom G7 Sensor)   No No   Sig: Use 1 Device every 10 days   Insulin Glargine Solostar (Lantus SoloStar) 100 UNIT/ML SOPN    No No   Sig: Use 7u subcutaneous daily bedtime   aspirin 81 mg chewable tablet   No No   Sig: Chew 1 tablet (81 mg total) daily   Patient not taking: Reported on 4/10/2025   cholecalciferol (VITAMIN D3) 1,000 units tablet   No No   Sig: Take 2 tablets (2,000 Units total) by mouth daily   cyanocobalamin (VITAMIN B-12) 1000 MCG tablet   No No   Sig: Take 1 tablet (1,000 mcg total) by mouth daily   diltiazem (CARDIZEM CD) 120 mg 24 hr capsule   No No   Sig: Take 1 capsule (120 mg total) by mouth daily   diltiazem (TIAZAC) 120 MG 24 hr capsule   Yes No   Sig: Take 1 capsule by mouth in the morning   fluticasone (FLONASE) 50 mcg/act nasal spray   No No   Si sprays into each nostril daily at bedtime as needed for rhinitis   Patient not taking: Reported on 4/10/2025   isosorbide mononitrate (IMDUR) 30 mg 24 hr tablet   No No   Sig: Take 1 tablet (30 mg total) by mouth daily   levothyroxine 75 mcg tablet   No No   Sig: Take 1 tablet (75 mcg total) by mouth daily   magnesium Oxide (MAG-OX) 400 mg TABS   No No   Sig: Take 2 tablets (800 mg total) by mouth 2 (two) times a day   Patient not taking: Reported on 4/10/2025   melatonin 3 mg   No No   Sig: Take 1 tablet (3 mg total) by mouth daily at bedtime   Patient not taking: Reported on 4/10/2025   pantoprazole (PROTONIX) 40 mg tablet   No No   Sig: Take 1 tablet (40 mg total) by mouth daily   Patient not taking: Reported on 4/10/2025   pravastatin (PRAVACHOL) 40 mg tablet   No No   Sig: Take 1 tablet (40 mg total) by mouth daily with dinner   traZODone (DESYREL) 50 mg tablet   No No   Sig: Take 1 tablet (50 mg total) by mouth daily at bedtime   Patient not taking: Reported on 4/10/2025   venlafaxine (EFFEXOR-XR) 75 mg 24 hr capsule   No No   Sig: Take 1 capsule (75 mg total) by mouth daily      Facility-Administered Medications: None     Discharge Medication List as of 2025 10:21 PM        CONTINUE these medications which have NOT CHANGED    Details   aspirin 81 mg  chewable tablet Chew 1 tablet (81 mg total) daily, Starting Thu 3/27/2025, Normal      cholecalciferol (VITAMIN D3) 1,000 units tablet Take 2 tablets (2,000 Units total) by mouth daily, Starting Thu 3/27/2025, Normal      Continuous Glucose Sensor (Dexcom G7 Sensor) Use 1 Device every 10 days, Starting Mon 3/31/2025, Normal      cyanocobalamin (VITAMIN B-12) 1000 MCG tablet Take 1 tablet (1,000 mcg total) by mouth daily, Starting Fri 3/28/2025, Until Tue 5/27/2025, Normal      diltiazem (CARDIZEM CD) 120 mg 24 hr capsule Take 1 capsule (120 mg total) by mouth daily, Starting Fri 3/28/2025, Until Tue 5/27/2025, Normal      diltiazem (TIAZAC) 120 MG 24 hr capsule Take 1 capsule by mouth in the morning, Starting Fri 3/28/2025, Historical Med      fluticasone (FLONASE) 50 mcg/act nasal spray 2 sprays into each nostril daily at bedtime as needed for rhinitis, Starting Mon 3/31/2025, Normal      Insulin Glargine Solostar (Lantus SoloStar) 100 UNIT/ML SOPN Use 7u subcutaneous daily bedtime, Normal      isosorbide mononitrate (IMDUR) 30 mg 24 hr tablet Take 1 tablet (30 mg total) by mouth daily, Starting Fri 3/28/2025, Until Tue 5/27/2025, Normal      levothyroxine 75 mcg tablet Take 1 tablet (75 mcg total) by mouth daily, Starting Thu 3/27/2025, Until Mon 5/26/2025, Normal      magnesium Oxide (MAG-OX) 400 mg TABS Take 2 tablets (800 mg total) by mouth 2 (two) times a day, Starting Thu 3/27/2025, Until Mon 5/26/2025, Normal      melatonin 3 mg Take 1 tablet (3 mg total) by mouth daily at bedtime, Starting Thu 3/27/2025, Until Mon 5/26/2025, Normal      pantoprazole (PROTONIX) 40 mg tablet Take 1 tablet (40 mg total) by mouth daily, Starting Thu 3/27/2025, Until Mon 5/26/2025, Normal      pravastatin (PRAVACHOL) 40 mg tablet Take 1 tablet (40 mg total) by mouth daily with dinner, Starting Thu 3/27/2025, Until Mon 5/26/2025, Normal      traZODone (DESYREL) 50 mg tablet Take 1 tablet (50 mg total) by mouth daily at bedtime,  Starting Thu 3/27/2025, Until Mon 5/26/2025, Normal      venlafaxine (EFFEXOR-XR) 75 mg 24 hr capsule Take 1 capsule (75 mg total) by mouth daily, Starting Fri 3/28/2025, Until Tue 5/27/2025, Normal           No discharge procedures on file.  ED SEPSIS DOCUMENTATION   Time reflects when diagnosis was documented in both MDM as applicable and the Disposition within this note       Time User Action Codes Description Comment    4/23/2025  9:31 PM Juana Kwong [R06.02] Shortness of breath     4/23/2025 10:19 PM Juana Kwong [F41.9] Anxiety                  Juana Kwong MD  04/23/25 8020

## 2025-04-24 NOTE — ED ATTENDING ATTESTATION
4/23/2025  I, Jace Olivares DO, saw and evaluated the patient. I have discussed the patient with the resident/non-physician practitioner and agree with the resident's/non-physician practitioner's findings, Plan of Care, and MDM as documented in the resident's/non-physician practitioner's note, except where noted. All available labs and Radiology studies were reviewed.  I was present for key portions of any procedure(s) performed by the resident/non-physician practitioner and I was immediately available to provide assistance.       At this point I agree with the current assessment done in the Emergency Department.  I have conducted an independent evaluation of this patient a history and physical is as follows:    MDM: 79yoF presenting to ER with acute onset MORSE without signs of PE, ACS, fluid overload, PNA, or any other acute process. Given dyspnea is paroxysmal and present mainly upon evaluation via physicians - question psychiatric component as pt suggests but as diagnosis of exclusion. Difficult conversation with pt and family in that pt is avoidant of her medications but is looking forward to the prospect of meeting with new psychiatric provider in order to be established on medications. Logic was provided that pt was doing well clinically and by her suggestion feeling much improved when maintained on SSRI/trazodone which she stopped with concern that they caused epistaxis. I reassured her this is highly unlikely the case and that she should resume meds. No 302 criteria. Given ?CP but definitively SOB - offered observation but pt adamantly requested DC to home with f/u to cardiology and psych. Reviewed all findings both relevant and incidental with the patient at bedside. Pt verbalized understanding of findings, neccesary follow up, return to ED precautions. Pt agreed to review today's findings with their primary care provider. Pt non-toxic appearing upon discharge.       ED Course  ED Course as of 04/23/25 8605    Wed Apr 23, 2025 2041 iSTAT VBG with respiratory alkalosis which I suspect 2/2 anxiety should further pulmonary and cardiac etiology continue to be excluded.    2055 79-year-old female, past medical history per chart, presenting to the emergency department with shortness of breath with rare episodes non-exertional non-radiating quickly resolving CP earlier in day.  Patient volunteers that these are her common anxiety equivalents.  Denies orthopnea, dyspnea exertion, chest pain on exertion, lower extremity edema, calf or popliteal pain.  Patient and family at bedside states that when she initially presented home from inpatient psychiatric care she was doing relatively well and on her medications.  She then got a nosebleed and attributed it to the trazodone and Effexor so discontinued both of those items.  She and family state that she is rarely compliant on any of her medications and chooses somewhat at random as to when she will take the medications but always less than prescribed.  Patient denies SI, HI, hallucinations, drug or alcohol use.    Hypertension, vital signs otherwise stable.  Prior to entering the room, patient resting comfortably without any dyspnea.  When patient engaged about her symptoms, becomes dyspneic the nature.  Alert and oriented.  No acute distress.  Lungs clear to auscultation.  Slightly tachypneic with no increased work of breathing.  Heart regular rhythm.  Abdomen soft nontender.  Lower extremities without edema, calf or popliteal tenderness.             Critical Care Time  Procedures

## 2025-04-24 NOTE — DISCHARGE INSTRUCTIONS
You were seen in the Emergency Department for: Shortness of breath    Your next steps should include: Follow-up with your primary care doctor in the next few days    Reasons to RETURN IMMEDIATELY to the Emergency Department: Have persistent shortness of breath, chest pain or develop any new or worsening symptoms that concern you

## 2025-04-25 ENCOUNTER — OFFICE VISIT (OUTPATIENT)
Dept: INTERNAL MEDICINE CLINIC | Facility: CLINIC | Age: 80
End: 2025-04-25
Payer: COMMERCIAL

## 2025-04-25 VITALS
WEIGHT: 158 LBS | HEART RATE: 88 BPM | DIASTOLIC BLOOD PRESSURE: 72 MMHG | BODY MASS INDEX: 29.83 KG/M2 | HEIGHT: 61 IN | SYSTOLIC BLOOD PRESSURE: 132 MMHG | OXYGEN SATURATION: 97 %

## 2025-04-25 DIAGNOSIS — M67.911 DISORDER OF RIGHT ROTATOR CUFF: ICD-10-CM

## 2025-04-25 DIAGNOSIS — J30.2 SEASONAL ALLERGIES: ICD-10-CM

## 2025-04-25 DIAGNOSIS — R19.4 CHANGE IN BOWEL HABIT: ICD-10-CM

## 2025-04-25 DIAGNOSIS — R29.898 MUSCULAR DECONDITIONING: Primary | ICD-10-CM

## 2025-04-25 DIAGNOSIS — R63.4 WEIGHT LOSS: ICD-10-CM

## 2025-04-25 DIAGNOSIS — F32.A DEPRESSIVE DISORDER: ICD-10-CM

## 2025-04-25 PROCEDURE — 99214 OFFICE O/P EST MOD 30 MIN: CPT | Performed by: INTERNAL MEDICINE

## 2025-04-25 PROCEDURE — G2211 COMPLEX E/M VISIT ADD ON: HCPCS | Performed by: INTERNAL MEDICINE

## 2025-04-25 NOTE — PROGRESS NOTES
Name: Yajaira Masrh      : 1945      MRN: [id number]  Encounter Provider: Sukumar Clemens DO  Encounter Date: 2025   Enc[facility] department: [facility]  :  Assessment & Plan  Muscular deconditioning  Over the last 2 months the patient has developed significant descending since her hospitalization.  Pneumonia we will start physical therapy  Orders:  •  Ambulatory Referral to Physical Therapy; Future    Seasonal allergies  Patient is interested in seeing a allergist she is concerned her symptoms are related to allergies she does report to me intermittent congestion  Orders:  •  Ambulatory Referral to Allergy; Future    Weight loss  Increase caloric intake recommended see gastroenterology  Orders:  •  Ambulatory Referral to Gastroenterology; Future    Change in bowel habit  Change in bowel habitus will have patient see GI  Orders:  •  Ambulatory Referral to Gastroenterology; Future    Disorder of right rotator cuff  Pain with internal rotation right shoulder since a fall several months ago will have patient see orthopedics Dr. Cordova  Orders:  •  Ambulatory Referral to Orthopedic Surgery; Future    Depressive disorder    Has an appointment coming up with psychiatry Dr. Castano she had missed her prior visit with psychiatry no SI currently on Effexor XR 75 mg once daily       Return to office  4 months  call if any problems       History of Present Illness   HPI 79-year old female coming in for a follow up office visit regarding muscular deconditioning, allergies, weight loss, change in bowel habitus, right shoulder pain and depression; the patient reports me compliant taking medications without untoward side effects the.  The patient is here to review his medical condition, update me on the medical condition and the patient reports me no hospitalizations and 1 ER visits.  Went to er for sob w/u , pnd , rhinorrhea, frontal sinus  sob like I needed to take deep breath secondary  "to nasal congestion.  Workup in the ER unrevealing patient is interested in seeing an allergist at this point  Anxiety , thinking about moving , daughter moving in  mad all the time , wanted him to be with her other night she was going to sleep on the couch and felt anxious and wanted him to be there with her but he threaten to call the police if I didn't get away from him, threating her that she would need to go to NH, feeding tube which increases his anxiety level  [place] [facility] [place] , only ate 1/2 of dessert trying to do better , left nostril bleed yesterday went away with pressure   Will see desean Castano on Monday but missed her initial visit  Review of Systems   Constitutional:  Positive for appetite change. Negative for activity change and unexpected weight change.   HENT:  Negative for congestion and postnasal drip.    Eyes:  Negative for visual disturbance.   Respiratory:  Negative for cough and shortness of breath.    Cardiovascular:  Negative for chest pain.   Gastrointestinal:  Negative for abdominal pain, diarrhea, nausea and vomiting.   Neurological:  Negative for dizziness, light-headedness and headaches.   Hematological:  Negative for adenopathy.   Psychiatric/Behavioral:  Negative for suicidal ideas. The patient is nervous/anxious.        Objective   /72 (BP Location: Left arm, Patient Position: Sitting, Cuff Size: Large)   Pulse 88   Ht 5' 1\" (1.549 m)   Wt 71.7 kg (158 lb)   SpO2 97%   BMI 29.85 kg/m²      Physical Exam  Vitals and nursing note reviewed.   Constitutional:       General: She is not in acute distress.     Appearance: Normal appearance. She is well-developed. She is not ill-appearing, toxic-appearing or diaphoretic.   HENT:      Head: Normocephalic and atraumatic.      Right Ear: External ear normal.      Left Ear: External ear normal.      Nose: Nose normal.   Eyes:      Pupils: Pupils are equal, round, and reactive to light. "   Cardiovascular:      Rate and Rhythm: Normal rate and regular rhythm.      Heart sounds: Normal heart sounds. No murmur heard.  Pulmonary:      Effort: Pulmonary effort is normal.      Breath sounds: Normal breath sounds.   Abdominal:      General: There is no distension.      Palpations: Abdomen is soft.      Tenderness: There is no abdominal tenderness. There is no guarding.   Psychiatric:         Mood and Affect: Mood is anxious. Mood is not depressed.         Thought Content: Thought content does not include suicidal ideation.       Administrative Statements   I have spent a total time of 30 minutes in caring for this patient on the day of the visit/encounter including Diagnostic results, Impressions, Counseling / Coordination of care, Documenting in the medical record, Reviewing/placing orders in the medical record (including tests, medications, and/or procedures), and Obtaining or reviewing history  .

## 2025-04-27 NOTE — ASSESSMENT & PLAN NOTE
Has an appointment coming up with psychiatry Dr. Castano she had missed her prior visit with psychiatry no SI currently on Effexor XR 75 mg once daily

## 2025-04-28 ENCOUNTER — OFFICE VISIT (OUTPATIENT)
Dept: PSYCHIATRY | Facility: CLINIC | Age: 80
End: 2025-04-28
Payer: COMMERCIAL

## 2025-04-28 ENCOUNTER — TELEPHONE (OUTPATIENT)
Age: 80
End: 2025-04-28

## 2025-04-28 DIAGNOSIS — S09.90XA INJURY OF HEAD, INITIAL ENCOUNTER: ICD-10-CM

## 2025-04-28 DIAGNOSIS — F33.1 MAJOR DEPRESSIVE DISORDER, RECURRENT, MODERATE (HCC): Primary | ICD-10-CM

## 2025-04-28 PROCEDURE — 90792 PSYCH DIAG EVAL W/MED SRVCS: CPT | Performed by: STUDENT IN AN ORGANIZED HEALTH CARE EDUCATION/TRAINING PROGRAM

## 2025-04-28 RX ORDER — MIRTAZAPINE 7.5 MG/1
TABLET, FILM COATED ORAL
Qty: 46 TABLET | Refills: 0 | Status: SHIPPED | OUTPATIENT
Start: 2025-04-28 | End: 2025-04-30 | Stop reason: SDUPTHER

## 2025-04-28 RX ORDER — MIRTAZAPINE 7.5 MG/1
TABLET, FILM COATED ORAL
Qty: 50 TABLET | Refills: 0 | Status: SHIPPED | OUTPATIENT
Start: 2025-04-28 | End: 2025-04-28

## 2025-04-28 NOTE — BH TREATMENT PLAN
"TREATMENT PLAN (Medication Management Only)        [facility]    Name and Date of Birth:  Yajaira Marsh 79 y.o. 1945  MRN: [id number]  Date of Treatment Plan: April 28, 2025  Diagnosis/Diagnoses:    1. Major depressive disorder, recurrent, moderate (HCC)    2. Generalized anxiety disorder      Strengths/Personal Resources for Self-Care: supportive family, ability to communicate needs, ability to communicate well, ability to listen, ability to reason, financial security, general fund of knowledge, good physical health, independence.  Area/Areas of need (in own words): \"my mood, sleeping and eating regularly\"   1. Long Term Goal:   improve mood.  Target Date:6 months - 10/28/2025  Person/Persons responsible for completion of goal: family Yajaira  2.  Short Term Objective (s) - How will we reach this goal?:   A.  Provider new recommended medication/dosage changes and/or continue medication(s): continue current medications as prescribed.  B.  Try new activities  C.  Start reading and sewing again  Target Date:6 months - 10/28/2025  Person/Persons Responsible for Completion of Goal: family Yajaira  Progress Towards Goals: starting treatment  Treatment Modality: medication management every 1-3 months  Review due 180 days from date of this plan: 6 months - 10/28/2025  Expected length of service: ongoing treatment unless revised  My Physician/PA/NP and I have developed this plan together and I agree to work on the goals and objectives. I understand the treatment goals that were developed for my treatment.   Electronic Signatures: on file (unless signed below)    Sammi Hernandez MD 04/28/25  "

## 2025-04-28 NOTE — TELEPHONE ENCOUNTER
Patient called requested otc recommendations for allergy medication that will not interact with the other prescribed medications.Please call patient with any updates.

## 2025-04-28 NOTE — PSYCH
" PSYCHIATRIC EVALUATION     [facility]    Name and Date of Birth:  Yajaira Marsh 79 y.o. 1945 MRN: [id number]    Date of Visit: April 28, 2025    Reason for visit: Comprehensive psychiatric assessment for medication management     Chief Complaint: \"***\"    HPI     Yajaira Marsh is a 79 y.o. Female, , with 1 daughter and 1 son, living with  in a senior living community, on SSI (worked as a ), with past medical history of CAD, HLD, DM, hypothyroidism, CKD, SARAY, h/o DVT and fall, and past psychiatric history of MADAN with anxiety attacks and MDD (Psychiatric Hospitalizations: yes, Outpatient Treatment History: yes, previously in outpatient psychiatric care with Dr. Miller at [facility] [phone]), Suicide Attempts: no, History of non-suicidal self injury: no, Violence History: no), who presents to the [facility] outpatient clinic for comprehensive psychiatric assessment.    Current medication regimen includes venlafaxine 75mg QD.    Admitted for 5 day, discharged on 03/28/2025.   First admitted inpatient 11/1/2024 and has had 3 admissions     Things gettings worse      Psychiatric Review Of Systems:    Sleep change: decreased    Appetite change: decreased, food doesn't taste the same, hungry but no appetite and physical   Weight changes & timeframe: Lost 40 lbs since early February, followed by several hospitalizations for various reasons including pneumonia, dehydration  Eating Disorder symptoms: No history of disordered eating.     Interest change: {EFO Yes/No/Increased/Decreased:81976::\"WNL\"}  Interests include: ***  Guilt/Hopelessness/helplessness/worthlessness: {YES/NO:60101::\"No\"}  Energy change: {EFO Yes/No/Increased/Decreased:03032::\"WNL\"}  Concentration/Attention change: decreased since discharge  Psychomotor agitation/retardation: no  Somatic symptoms: {YES/NO:82697::\"No\"}  Suicidal ideation: adamantly " "denies, very afraid to die  Homicidal ideation: {EFO Homicidal ideation:11919::\"no\"}  Katherine/hypomania: {EFO Katherine history:30373::\"no\"}    Anxiety/panic attack: yes, irritable  MADAN symptoms: {MADAN Sx:42899::\"Denies\"}  Panic Disorder symptoms: {Panic Sx:36300::\"Denies\"}  Social Anxiety symptoms: {Social Anxiety Sx:72203::\"Denies\"}  Obsessive/compulsive symptoms: {EFO Obsessions:02235::\"no\"}    Auditory hallucinations: no  Visual hallucinations: no  Other perceptual disturbances: no  Delusional thinking: no    Review Of Systems:  Complete review of systems is negative except as noted above.     Current Rating Scores:     Current PHQ-9   PHQ-2/9 Depression Screening           Current MADAN-7 is .    Past Psychiatric History:     Past psychiatric diagnoses:   ***  Inpatient psychiatric admissions:   ***  Prior outpatient psychiatric treatment:   ***  Past/current psychotherapy:   ***  History of suicidal attempts/gestures:   ***   History of non-suicidal self-injurious behavior:   ***  History of violence/aggressive behaviors:   ***  Psychotropic medication trials:   Antidepressants:  duloxetine  Antipsychotics:  ***  Mood stabilizers:  ***  Anxiolytics:  Alprazolam 0.5 mg BID for decades  Buspirone    Others:  gabapentin  Substance abuse inpatient/outpatient rehabilitation:   ***  Eating disorder history:   {EFO Eating Disorder:73989}    Substance Abuse History:    {ALC Substance History:79694::\"Denies history of alcohol, illict substance, or tobacco abuse.\",\"Patient denies previous legal actions or arrests related to substance intoxication including prior DWIs/DUIs.\",\"Patient does not exhibit objective evidence of substance withdrawal during today's examination nor do they appear under the influence of any psychoactive substance.  \"}    Family Psychiatric History:   Psychiatric Family History: ***  Suicide Attempts: {familymembers:42083::\"none\"}  Patient otherwise denies known family history of psychiatric illness, " "substance use, or suicide attempts.    Social History:    Developmental: {ALC Developmental Delay:68532::\"Patient denies a history of milestone/developmental delay.\",\"There is no documented history of 504/IEP or need for special education.\",\"Patient denies any known in-utero exposure to toxins or illicit substances.\"}  Education: {misc; education:02509}  Marital history: {Desc; marital status:62}  Children: {EFO Children:60048}  Living arrangement, social support: {FSFAMMEMB:08042}  Occupational History: {[employer] Occupational History:69209}  Muslim Affiliation: {[facility] Muslim Affiliation:15186::\"Denies/Did not disclose\"}  Access to firearms: {ALC Firearm OP:58714::\"Denies direct access to weapons/firearms. \",\"Patient denies history of arrests or violence with a deadly weapon. \"}   history: {EFO [facility]  service:65007}    Traumatic History:     Abuse: ***  Other Traumatic Events: {[employer] Other Traumatic Events:11967}    Past Medical History:    Past Medical History:   Diagnosis Date    Acute embolism and thrombosis of unspecified deep veins of unspecified lower extremity (HCC)     Last Assessed:  5/18/17    Anemia     Anosmia     Anxiety     Arthritis     Asthma     Back pain     Bilateral macular retinal edema     CAD (coronary artery disease)     Cataract     Cervical disc herniation     Cervical radiculopathy     Cervical spinal stenosis     Cervical spondylolysis     Chronic kidney disease     Chronic mastoiditis     Colon polyp     Complex endometrial hyperplasia     Depression     Diabetes mellitus (HCC)     Disease of thyroid gland     DVT (deep venous thrombosis) ([facility])     DVT (deep venous thrombosis) ([facility]) 06/16/2017    Fibromyalgia     Fibromyalgia, primary     Hyperlipidemia     Hypertension     Hypothyroid     Kidney stone     Lumbar radiculopathy     Neck pain     Obese     PONV (postoperative nausea and vomiting)     Shingles 07/01/2021    Spinal stenosis     Stomach ulcer     " "Thyroid disease     Toxic metabolic encephalopathy 02/11/2025    Vascular claudication (HCC) 12/11/2017        Past Surgical History:   Procedure Laterality Date    BACK SURGERY      CARPAL TUNNEL RELEASE      CATARACT EXTRACTION      CHOLECYSTECTOMY      COLONOSCOPY      CORONARY ANGIOPLASTY      CORONARY ARTERY BYPASS GRAFT      CYSTOSCOPY N/A 6/20/2017    Procedure: CYSTOSCOPY;  Surgeon: Tacho Arnold MD;  Location: [facility];  Service: Gynecology Oncology    CYSTOSCOPY      AZ LAPS TOTAL HYSTERECT 250 GM/< W/RMVL TUBE/OVARY N/A 6/20/2017    Procedure: ROBOTIC HYSTERECTOMY; BILATERAL SALPINO-OOPHERECTOMY; umbilical hernia repair.;  Surgeon: Tacho Arnold MD;  Location[facility];  Service: Gynecology Oncology    AZ REPAIR FIRST ABDOMINAL WALL HERNIA N/A 5/12/2022    Procedure: REPAIR HERNIA INCISIONAL;  Surgeon: Horacio Scherer DO;  Location: [facility];  Service: General    TONSILECTOMY AND ADNOIDECTOMY      TONSILLECTOMY      UMBILICAL HERNIA REPAIR       Allergies   Allergen Reactions    Bee Pollen Other (See Comments)    Cat Dander Other (See Comments)    Dog Epithelium (Canis Lupus Familiaris) Other (See Comments)    Molds & Smuts Allergic Rhinitis    Other Allergic Rhinitis     RAGWEED, CAT DANDER, DOG DANDER     Short Ragweed Pollen Ext Other (See Comments)    Pollen Extract Other (See Comments)     Cold symptoms         History Review:    The following portions of the patient's history were reviewed and updated as appropriate: allergies, current medications, past family history, past medical history, past social history, past surgical history, and problem list.    OBJECTIVE:    Vital signs in last 24 hours:    There were no vitals filed for this visit.    Mental Status Evaluation:    Appearance age appropriate, casually dressed, appropriate hygeine   Behavior cooperative, calm   Speech normal rate, normal volume, normal pitch   Mood {EFO EXAM; MOOD LESS/MORE:15755::\"euthymic\"}   Affect normal " "range and intensity, appropriate   Thought Processes organized, goal directed   Associations intact associations   Thought Content no overt delusions   Perceptual Disturbances: Does not appear to be responding to internal stimuli   Abnormal Thoughts  Risk Potential {JA MSE RISK:81677::\"Denies suicidal or homicidal ideation, plan, or intent\"}   Orientation oriented to person, place, time/date, and situation   Memory recent and remote memory grossly intact   Consciousness alert and awake   Attention Span Concentration Span attention span and concentration are age appropriate   Intellect appears to be of average intelligence   Insight intact   Judgement intact   Muscle Strength and  Gait normal muscle strength and normal muscle tone, normal gait and normal balance   Motor Activity no abnormal movements   Language no difficulty naming common objects, no difficulty repeating a phrase, no difficulty writing a sentence   Fund of Knowledge adequate knowledge of current events  adequate fund of knowledge regarding past history  adequate fund of knowledge regarding vocabulary        Laboratory Results: I have personally reviewed all pertinent laboratory/tests results    Admission on 04/23/2025, Discharged on 04/23/2025   Component Date Value Ref Range Status    Ventricular Rate 04/23/2025 92  BPM Final    Atrial Rate 04/23/2025 92  BPM Final    MO Interval 04/23/2025 150  ms Final    QRSD Interval 04/23/2025 88  ms Final    QT Interval 04/23/2025 364  ms Final    QTC Interval 04/23/2025 450  ms Final    P Axis 04/23/2025 73  degrees Final    QRS Axis 04/23/2025 -51  degrees Final    T Wave [place] 04/23/2025 58  degrees Final    WBC 04/23/2025 8.99  4.31 - 10.16 Thousand/uL Final    RBC 04/23/2025 4.59  3.81 - 5.12 Million/uL Final    Hemoglobin 04/23/2025 12.7  11.5 - 15.4 g/dL Final    Hematocrit 04/23/2025 39.1  34.8 - 46.1 % Final    MCV 04/23/2025 85  82 - 98 fL Final    MCH 04/23/2025 27.7  26.8 - 34.3 pg Final    MCHC " 04/23/2025 32.5  31.4 - 37.4 g/dL Final    RDW 04/23/2025 14.4  11.6 - 15.1 % Final    MPV 04/23/2025 11.9  8.9 - 12.7 fL Final    Platelets 04/23/2025 234  149 - 390 Thousands/uL Final    nRBC 04/23/2025 0  /100 WBCs Final    Segmented % 04/23/2025 65  43 - 75 % Final    Immature Grans % 04/23/2025 0  0 - 2 % Final    Lymphocytes % 04/23/2025 27  14 - 44 % Final    Monocytes % 04/23/2025 8  4 - 12 % Final    Eosinophils Relative 04/23/2025 0  0 - 6 % Final    Basophils Relative 04/23/2025 0  0 - 1 % Final    Absolute Neutrophils 04/23/2025 5.80  1.85 - 7.62 Thousands/µL Final    Absolute Immature Grans 04/23/2025 0.01  0.00 - 0.20 Thousand/uL Final    Absolute Lymphocytes 04/23/2025 2.45  0.60 - 4.47 Thousands/µL Final    Absolute Monocytes 04/23/2025 0.68  0.17 - 1.22 Thousand/µL Final    Eosinophils Absolute 04/23/2025 0.01  0.00 - 0.61 Thousand/µL Final    Basophils Absolute 04/23/2025 0.04  0.00 - 0.10 Thousands/µL Final    Sodium 04/23/2025 140  135 - 147 mmol/L Final    Potassium 04/23/2025 4.1  3.5 - 5.3 mmol/L Final    Chloride 04/23/2025 105  96 - 108 mmol/L Final    CO2 04/23/2025 20 (L)  21 - 32 mmol/L Final    ANION GAP 04/23/2025 15 (H)  4 - 13 mmol/L Final    BUN 04/23/2025 16  5 - 25 mg/dL Final    Creatinine 04/23/2025 1.44 (H)  0.60 - 1.30 mg/dL Final    Standardized to IDMS reference method    Glucose 04/23/2025 193 (H)  65 - 140 mg/dL Final    If the patient is fasting, the ADA then defines impaired fasting glucose as > 100 mg/dL and diabetes as > or equal to 123 mg/dL.    Calcium 04/23/2025 10.2  8.4 - 10.2 mg/dL Final    AST 04/23/2025 20  13 - 39 U/L Final    ALT 04/23/2025 15  7 - 52 U/L Final    Specimen collection should occur prior to Sulfasalazine administration due to the potential for falsely depressed results.     Alkaline Phosphatase 04/23/2025 88  34 - 104 U/L Final    Total Protein 04/23/2025 7.7  6.4 - 8.4 g/dL Final    Albumin 04/23/2025 3.9  3.5 - 5.0 g/dL Final    Total  "Bilirubin 04/23/2025 0.71  0.20 - 1.00 mg/dL Final    Use of this assay is not recommended for patients undergoing treatment with eltrombopag due to the potential for falsely elevated results.  N-acetyl-p-benzoquinone imine (metabolite of Acetaminophen) will generate erroneously low results in samples for patients that have taken an overdose of Acetaminophen.    eGFR 04/23/2025 34  ml/min/1.73sq m Final    hs TnI 0hr 04/23/2025 13  \"Refer to ACS Flowchart\"- see link ng/L Final    Comment:                                              Initial (time 0) result  If >=50 ng/L, Myocardial injury suggested ;  Type of myocardial injury and treatment strategy  to be determined.  If 5-49 ng/L, a delta result at 2 hours and or 4 hours will be needed to further evaluate.  If <4 ng/L, and chest pain has been >3 hours since onset, patient may qualify for discharge based on the HEART score in the ED.  If <5 ng/L and <3hours since onset of chest pain, a delta result at 2 hours will be needed to further evaluate.    HS Troponin 99th Percentile URL of a Health Population=12 ng/L with a 95% Confidence Interval of 8-18 ng/L.    Second Troponin (time 2 hours)  If calculated delta >= 20 ng/L,  Myocardial injury suggested ; Type of myocardial injury and treatment strategy to be determined.  If 5-49 ng/L and the calculated delta is 5-19 ng/L, consult medical service for evaluation.  Continue evaluation for ischemia on ecg and other possible etiology and repeat hs troponin at 4 hours.  If delta                            is <5 ng/L at 2 hours, consider discharge based on risk stratification via the HEART score (if in ED), or KATHIA risk score in IP/Observation.    HS Troponin 99th Percentile URL of a Health Population=12 ng/L with a 95% Confidence Interval of 8-18 ng/L.    SARS COV Rapid Antigen 04/23/2025 Negative  Negative Final    Influenza A Rapid Antigen 04/23/2025 Negative  Negative Final    Influenza B Rapid Antigen 04/23/2025 Negative  " "Negative Final    D-Dimer, Quant 04/23/2025 1.51 (H)  <0.50 ug/ml FEU Final    Reference and upper limits to exclude DVT and PE are the same.  Do not use to exclude if clinical symptoms are present.  Pregnant women:  1st trimester:  <0.22 - 1.06 ug/ml FEU  2nd trimester:  <0.22 - 1.88 ug/ml FEU  3rd trimester:   0.24 - 3.28 ug/ml FEU    Note: Normal ranges may not apply to patients who are transgender, non-binary, or whose legal sex, sex at birth, and gender identity differ.      hs TnI 2hr 04/23/2025 12  \"Refer to ACS Flowchart\"- see link ng/L Final    Comment:                                              Initial (time 0) result  If >=50 ng/L, Myocardial injury suggested ;  Type of myocardial injury and treatment strategy  to be determined.  If 5-49 ng/L, a delta result at 2 hours and or 4 hours will be needed to further evaluate.  If <4 ng/L, and chest pain has been >3 hours since onset, patient may qualify for discharge based on the HEART score in the ED.  If <5 ng/L and <3hours since onset of chest pain, a delta result at 2 hours will be needed to further evaluate.    HS Troponin 99th Percentile URL of a Health Population=12 ng/L with a 95% Confidence Interval of 8-18 ng/L.    Second Troponin (time 2 hours)  If calculated delta >= 20 ng/L,  Myocardial injury suggested ; Type of myocardial injury and treatment strategy to be determined.  If 5-49 ng/L and the calculated delta is 5-19 ng/L, consult medical service for evaluation.  Continue evaluation for ischemia on ecg and other possible etiology and repeat hs troponin at 4 hours.  If delta                            is <5 ng/L at 2 hours, consider discharge based on risk stratification via the HEART score (if in ED), or KATHIA risk score in IP/Observation.    HS Troponin 99th Percentile URL of a Health Population=12 ng/L with a 95% Confidence Interval of 8-18 ng/L.    Delta 2hr hsTnI 04/23/2025 -1  <20 ng/L Final    ph, Dae ISTAT 04/23/2025 7.558 (H)  7.300 - 7.400 " Final    pCO2, Dae i-STAT 04/23/2025 18.1 (LL)  42.0 - 50.0 mm HG Final    pO2, Dae i-STAT 04/23/2025 34.0 (L)  35.0 - 45.0 mm HG Final    BE, i-STAT 04/23/2025 -4 (L)  -2 - 3 mmol/L Final    HCO3, Dae i-STAT 04/23/2025 16.2 (LL)  24.0 - 30.0 mmol/L Final    CO2, i-STAT 04/23/2025 17 (L)  21 - 32 mmol/L Final    O2 Sat, i-STAT 04/23/2025 76  60 - 85 % Final    SODIUM, I-STAT 04/23/2025 140  136 - 145 mmol/l Final    Potassium, i-STAT 04/23/2025 3.3 (L)  3.5 - 5.3 mmol/L Final    Calcium, Ionized i-STAT 04/23/2025 1.11 (L)  1.12 - 1.32 mmol/L Final    Hct, i-STAT 04/23/2025 31 (L)  34.8 - 46.1 % Final    Hgb, i-STAT 04/23/2025 10.5 (L)  11.5 - 15.4 g/dl Final    Glucose, i-STAT 04/23/2025 159 (H)  65 - 140 mg/dl Final    Specimen Type 04/23/2025 VENOUS   Final   Admission on 03/24/2025, Discharged on 03/28/2025   Component Date Value Ref Range Status    POC Glucose 03/24/2025 230 (H)  65 - 140 mg/dl Final    POC Glucose 03/24/2025 182 (H)  65 - 140 mg/dl Final    Sodium 03/25/2025 142  135 - 147 mmol/L Final    Potassium 03/25/2025 3.5  3.5 - 5.3 mmol/L Final    Chloride 03/25/2025 113 (H)  96 - 108 mmol/L Final    CO2 03/25/2025 19 (L)  21 - 32 mmol/L Final    ANION GAP 03/25/2025 10  4 - 13 mmol/L Final    BUN 03/25/2025 20  5 - 25 mg/dL Final    Creatinine 03/25/2025 2.22 (H)  0.60 - 1.30 mg/dL Final    Standardized to IDMS reference method    Glucose 03/25/2025 169 (H)  65 - 140 mg/dL Final    If the patient is fasting, the ADA then defines impaired fasting glucose as > 100 mg/dL and diabetes as > or equal to 123 mg/dL.    Glucose, Fasting 03/25/2025 169 (H)  65 - 99 mg/dL Final    Calcium 03/25/2025 9.0  8.4 - 10.2 mg/dL Final    eGFR 03/25/2025 20  ml/min/1.73sq m Final    Phosphorus 03/25/2025 4.2 (H)  2.3 - 4.1 mg/dL Final    Magnesium 03/25/2025 1.3 (L)  1.9 - 2.7 mg/dL Final    Cholesterol 03/25/2025 107  See Comment mg/dL Final    Cholesterol:         Pediatric <18 Years        Desirable          <170  mg/dL      Borderline High    170-199 mg/dL      High               >=200 mg/dL        Adult >=18 Years            Desirable         <200 mg/dL      Borderline High   200-239 mg/dL      High              >239 mg/dL      Triglycerides 03/25/2025 143  See Comment mg/dL Final    Triglyceride:     0-9Y            <75mg/dL     10Y-17Y         <90 mg/dL       >=18Y     Normal          <150 mg/dL     Borderline High 150-199 mg/dL     High            200-499 mg/dL        Very High       >499 mg/dL    Specimen collection should occur prior to Metamizole administration due to the potential for falsely depressed results.    HDL, Direct 03/25/2025 32 (L)  >=50 mg/dL Final    LDL Calculated 03/25/2025 46  0 - 100 mg/dL Final    LDL Cholesterol:     Optimal           <100 mg/dl     Near Optimal      100-129 mg/dl     Above Optimal       Borderline High 130-159 mg/dl       High            160-189 mg/dl       Very High       >189 mg/dl         This screening LDL is a calculated result.   It does not have the accuracy of the Direct Measured LDL in the monitoring of patients with hyperlipidemia and/or statin therapy.   Direct Measure LDL (JJM115) must be ordered separately in these patients.    Non-HDL-Chol (CHOL-HDL) 03/25/2025 75  mg/dl Final    Folate 03/25/2025 10.9  >5.9 ng/mL Final    The World Health Organization has determined deficient folate concentrations are considered to be <4.0 ng/mL.    Treponema Pallidium Total Antibodi* 03/26/2025 Non-reactive  Non-reactive Final    No serological evidence of infection with T. pallidum.  Early or incubating syphilis infection cannot be excluded.    Consider repeat testing based on clinical suspicion.      Vitamin B-12 03/25/2025 144 (L)  180 - 914 pg/mL Final    Vit D, 25-Hydroxy 03/25/2025 36.5  30.0 - 100.0 ng/mL Final    Vitamin D guidelines established by Clinical Guidelines Subcommittee  of the Endocrine Society Task Force, 2011    Deficiency <20ng/ml   Insufficiency 20-30ng/ml    Sufficient  ng/ml     HIV AB/AG HT Inter 03/26/2025 Non-Reactive  Non-Reactive Final      Sample was found to be negative for HIV p24 antigen and HIV specific antibodies. A non-reactive result does not exclude the possibility of exposure to or infection with HIV. Non-Reactive results can occur if the quantity of marker present is too low or if the marker is not present during the stage of disease at the time of collection. Repeat testing should be considered where there is clinical suspicion of infection.    POC Glucose 03/25/2025 218 (H)  65 - 140 mg/dl Final    POC Glucose 03/25/2025 166 (H)  65 - 140 mg/dl Final    POC Glucose 03/25/2025 178 (H)  65 - 140 mg/dl Final    Sodium 03/26/2025 139  135 - 147 mmol/L Final    Potassium 03/26/2025 3.1 (L)  3.5 - 5.3 mmol/L Final    Chloride 03/26/2025 109 (H)  96 - 108 mmol/L Final    CO2 03/26/2025 19 (L)  21 - 32 mmol/L Final    ANION GAP 03/26/2025 11  4 - 13 mmol/L Final    BUN 03/26/2025 18  5 - 25 mg/dL Final    Creatinine 03/26/2025 1.78 (H)  0.60 - 1.30 mg/dL Final    Standardized to IDMS reference method    Glucose 03/26/2025 197 (H)  65 - 140 mg/dL Final    If the patient is fasting, the ADA then defines impaired fasting glucose as > 100 mg/dL and diabetes as > or equal to 123 mg/dL.    Glucose, Fasting 03/26/2025 197 (H)  65 - 99 mg/dL Final    Calcium 03/26/2025 8.4  8.4 - 10.2 mg/dL Final    eGFR 03/26/2025 26  ml/min/1.73sq m Final    Magnesium 03/26/2025 1.2 (L)  1.9 - 2.7 mg/dL Final    Phosphorus 03/26/2025 3.4  2.3 - 4.1 mg/dL Final    POC Glucose 03/26/2025 145 (H)  65 - 140 mg/dl Final    POC Glucose 03/26/2025 190 (H)  65 - 140 mg/dl Final    POC Glucose 03/26/2025 217 (H)  65 - 140 mg/dl Final    POC Glucose 03/26/2025 153 (H)  65 - 140 mg/dl Final    Sodium 03/27/2025 141  135 - 147 mmol/L Final    Potassium 03/27/2025 3.8  3.5 - 5.3 mmol/L Final    Chloride 03/27/2025 112 (H)  96 - 108 mmol/L Final    CO2 03/27/2025 21  21 - 32 mmol/L  Final    ANION GAP 03/27/2025 8  4 - 13 mmol/L Final    BUN 03/27/2025 16  5 - 25 mg/dL Final    Creatinine 03/27/2025 1.43 (H)  0.60 - 1.30 mg/dL Final    Standardized to IDMS reference method    Glucose 03/27/2025 87  65 - 140 mg/dL Final    If the patient is fasting, the ADA then defines impaired fasting glucose as > 100 mg/dL and diabetes as > or equal to 123 mg/dL.    Glucose, Fasting 03/27/2025 87  65 - 99 mg/dL Final    Calcium 03/27/2025 8.5  8.4 - 10.2 mg/dL Final    eGFR 03/27/2025 35  ml/min/1.73sq m Final    Magnesium 03/27/2025 1.4 (L)  1.9 - 2.7 mg/dL Final    POC Glucose 03/27/2025 93  65 - 140 mg/dl Final    POC Glucose 03/27/2025 155 (H)  65 - 140 mg/dl Final    POC Glucose 03/27/2025 182 (H)  65 - 140 mg/dl Final    CRITICAL VALUE NOTED-    POC Glucose 03/27/2025 172 (H)  65 - 140 mg/dl Final    POC Glucose 03/28/2025 133  65 - 140 mg/dl Final   Admission on 03/23/2025, Discharged on 03/24/2025   Component Date Value Ref Range Status    Color, UA 03/23/2025 Colorless   Final    Clarity, UA 03/23/2025 Clear   Final    Specific Gravity, UA 03/23/2025 1.004  1.003 - 1.030 Final    pH, UA 03/23/2025 5.5  4.5, 5.0, 5.5, 6.0, 6.5, 7.0, 7.5, 8.0 Final    Leukocytes, UA 03/23/2025 Negative  Negative Final    Nitrite, UA 03/23/2025 Negative  Negative Final    Protein, UA 03/23/2025 Negative  Negative mg/dl Final    Glucose, UA 03/23/2025 Trace (A)  Negative mg/dl Final    Ketones, UA 03/23/2025 Negative  Negative mg/dl Final    Urobilinogen, UA 03/23/2025 <2.0  <2.0 mg/dl mg/dl Final    Bilirubin, UA 03/23/2025 Negative  Negative Final    Occult Blood, UA 03/23/2025 Negative  Negative Final    Sodium 03/23/2025 138  135 - 147 mmol/L Final    Potassium 03/23/2025 3.9  3.5 - 5.3 mmol/L Final    Chloride 03/23/2025 111 (H)  96 - 108 mmol/L Final    CO2 03/23/2025 18 (L)  21 - 32 mmol/L Final    ANION GAP 03/23/2025 9  4 - 13 mmol/L Final    BUN 03/23/2025 16  5 - 25 mg/dL Final    Creatinine 03/23/2025 1.31  (H)  0.60 - 1.30 mg/dL Final    Standardized to IDMS reference method    Glucose 03/23/2025 189 (H)  65 - 140 mg/dL Final    If the patient is fasting, the ADA then defines impaired fasting glucose as > 100 mg/dL and diabetes as > or equal to 123 mg/dL.    Calcium 03/23/2025 9.3  8.4 - 10.2 mg/dL Final    AST 03/23/2025 18  13 - 39 U/L Final    ALT 03/23/2025 18  7 - 52 U/L Final    Specimen collection should occur prior to Sulfasalazine administration due to the potential for falsely depressed results.     Alkaline Phosphatase 03/23/2025 98  34 - 104 U/L Final    Total Protein 03/23/2025 7.3  6.4 - 8.4 g/dL Final    Albumin 03/23/2025 4.0  3.5 - 5.0 g/dL Final    Total Bilirubin 03/23/2025 0.74  0.20 - 1.00 mg/dL Final    Use of this assay is not recommended for patients undergoing treatment with eltrombopag due to the potential for falsely elevated results.  N-acetyl-p-benzoquinone imine (metabolite of Acetaminophen) will generate erroneously low results in samples for patients that have taken an overdose of Acetaminophen.    eGFR 03/23/2025 39  ml/min/1.73sq m Final    WBC 03/23/2025 9.17  4.31 - 10.16 Thousand/uL Final    RBC 03/23/2025 4.39  3.81 - 5.12 Million/uL Final    Hemoglobin 03/23/2025 12.0  11.5 - 15.4 g/dL Final    Hematocrit 03/23/2025 36.2  34.8 - 46.1 % Final    MCV 03/23/2025 83  82 - 98 fL Final    MCH 03/23/2025 27.3  26.8 - 34.3 pg Final    MCHC 03/23/2025 33.1  31.4 - 37.4 g/dL Final    RDW 03/23/2025 14.6  11.6 - 15.1 % Final    MPV 03/23/2025 11.4  8.9 - 12.7 fL Final    Platelets 03/23/2025 245  149 - 390 Thousands/uL Final    nRBC 03/23/2025 0  /100 WBCs Final    Segmented % 03/23/2025 67  43 - 75 % Final    Immature Grans % 03/23/2025 0  0 - 2 % Final    Lymphocytes % 03/23/2025 25  14 - 44 % Final    Monocytes % 03/23/2025 7  4 - 12 % Final    Eosinophils Relative 03/23/2025 1  0 - 6 % Final    Basophils Relative 03/23/2025 0  0 - 1 % Final    Absolute Neutrophils 03/23/2025 6.11  1.85  - 7.62 Thousands/µL Final    Absolute Immature Grans 03/23/2025 0.04  0.00 - 0.20 Thousand/uL Final    Absolute Lymphocytes 03/23/2025 2.26  0.60 - 4.47 Thousands/µL Final    Absolute Monocytes 03/23/2025 0.63  0.17 - 1.22 Thousand/µL Final    Eosinophils Absolute 03/23/2025 0.10  0.00 - 0.61 Thousand/µL Final    Basophils Absolute 03/23/2025 0.03  0.00 - 0.10 Thousands/µL Final    TSH 3RD GENERATON 03/23/2025 5.080 (H)  0.450 - 4.500 uIU/mL Final    The recommended reference ranges for TSH during pregnancy are as follows:   First trimester 0.100 to 2.500 uIU/mL   Second trimester  0.200 to 3.000 uIU/mL   Third trimester 0.300 to 3.000 uIU/m    Note: Normal ranges may not apply to patients who are transgender, non-binary, or whose legal sex, sex at birth, and gender identity differ.  Adult TSH (3rd generation) reference range follows the recommended guidelines of the [employer], January, 2020.    Ethanol Lvl 03/23/2025 19 (H)  <10 mg/dL Final    Salicylate Lvl 03/23/2025 <5  3 - 20 mg/dL Final    Acetaminophen Level 03/23/2025 <2 (L)  10 - 20 ug/mL Final    POC Glucose 03/23/2025 178 (H)  65 - 140 mg/dl Final    Free T4 03/23/2025 0.80  0.61 - 1.12 ng/dL Final    Specimens with biotin concentrations > 10 ng/mL can lead to significant (> 10%) positive bias in result.    Sodium 03/24/2025 140  135 - 147 mmol/L Final    Potassium 03/24/2025 3.3 (L)  3.5 - 5.3 mmol/L Final    Chloride 03/24/2025 114 (H)  96 - 108 mmol/L Final    CO2 03/24/2025 18 (L)  21 - 32 mmol/L Final    ANION GAP 03/24/2025 8  4 - 13 mmol/L Final    BUN 03/24/2025 14  5 - 25 mg/dL Final    Creatinine 03/24/2025 1.38 (H)  0.60 - 1.30 mg/dL Final    Standardized to IDMS reference method    Glucose 03/24/2025 217 (H)  65 - 140 mg/dL Final    If the patient is fasting, the ADA then defines impaired fasting glucose as > 100 mg/dL and diabetes as > or equal to 123 mg/dL.    Calcium 03/24/2025 9.4  8.4 - 10.2 mg/dL Final    eGFR  03/24/2025 36  ml/min/1.73sq m Final    POC Glucose 03/24/2025 205 (H)  65 - 140 mg/dl Final    POC Glucose 03/24/2025 189 (H)  65 - 140 mg/dl Final    Ventricular Rate 03/23/2025 62  BPM Final    Atrial Rate 03/23/2025 62  BPM Final    UT Interval 03/23/2025 182  ms Final    QRSD Interval 03/23/2025 92  ms Final    QT Interval 03/23/2025 434  ms Final    QTC Interval 03/23/2025 440  ms Final    P Axis 03/23/2025 65  degrees Final    QRS Axis 03/23/2025 -29  degrees Final    T Wave [place] 03/23/2025 -4  degrees Final    POC Glucose 03/24/2025 216 (H)  65 - 140 mg/dl Final   Admission on 03/22/2025, Discharged on 03/22/2025   Component Date Value Ref Range Status    WBC 03/22/2025 9.08  4.31 - 10.16 Thousand/uL Final    RBC 03/22/2025 4.39  3.81 - 5.12 Million/uL Final    Hemoglobin 03/22/2025 12.1  11.5 - 15.4 g/dL Final    Hematocrit 03/22/2025 37.1  34.8 - 46.1 % Final    MCV 03/22/2025 85  82 - 98 fL Final    MCH 03/22/2025 27.6  26.8 - 34.3 pg Final    MCHC 03/22/2025 32.6  31.4 - 37.4 g/dL Final    RDW 03/22/2025 14.4  11.6 - 15.1 % Final    MPV 03/22/2025 11.7  8.9 - 12.7 fL Final    Platelets 03/22/2025 238  149 - 390 Thousands/uL Final    nRBC 03/22/2025 0  /100 WBCs Final    Segmented % 03/22/2025 71  43 - 75 % Final    Immature Grans % 03/22/2025 0  0 - 2 % Final    Lymphocytes % 03/22/2025 21  14 - 44 % Final    Monocytes % 03/22/2025 6  4 - 12 % Final    Eosinophils Relative 03/22/2025 2  0 - 6 % Final    Basophils Relative 03/22/2025 0  0 - 1 % Final    Absolute Neutrophils 03/22/2025 6.37  1.85 - 7.62 Thousands/µL Final    Absolute Immature Grans 03/22/2025 0.04  0.00 - 0.20 Thousand/uL Final    Absolute Lymphocytes 03/22/2025 1.90  0.60 - 4.47 Thousands/µL Final    Absolute Monocytes 03/22/2025 0.54  0.17 - 1.22 Thousand/µL Final    Eosinophils Absolute 03/22/2025 0.19  0.00 - 0.61 Thousand/µL Final    Basophils Absolute 03/22/2025 0.04  0.00 - 0.10 Thousands/µL Final    Sodium 03/22/2025 133 (L)  135 -  "147 mmol/L Final    Potassium 03/22/2025 3.7  3.5 - 5.3 mmol/L Final    Chloride 03/22/2025 104  96 - 108 mmol/L Final    CO2 03/22/2025 19 (L)  21 - 32 mmol/L Final    ANION GAP 03/22/2025 10  4 - 13 mmol/L Final    BUN 03/22/2025 26 (H)  5 - 25 mg/dL Final    Creatinine 03/22/2025 1.57 (H)  0.60 - 1.30 mg/dL Final    Standardized to IDMS reference method    Glucose 03/22/2025 333 (H)  65 - 140 mg/dL Final    If the patient is fasting, the ADA then defines impaired fasting glucose as > 100 mg/dL and diabetes as > or equal to 123 mg/dL.    Calcium 03/22/2025 9.7  8.4 - 10.2 mg/dL Final    AST 03/22/2025 24  13 - 39 U/L Final    ALT 03/22/2025 22  7 - 52 U/L Final    Specimen collection should occur prior to Sulfasalazine administration due to the potential for falsely depressed results.     Alkaline Phosphatase 03/22/2025 95  34 - 104 U/L Final    Total Protein 03/22/2025 7.7  6.4 - 8.4 g/dL Final    Albumin 03/22/2025 3.9  3.5 - 5.0 g/dL Final    Total Bilirubin 03/22/2025 0.69  0.20 - 1.00 mg/dL Final    Use of this assay is not recommended for patients undergoing treatment with eltrombopag due to the potential for falsely elevated results.  N-acetyl-p-benzoquinone imine (metabolite of Acetaminophen) will generate erroneously low results in samples for patients that have taken an overdose of Acetaminophen.    eGFR 03/22/2025 31  ml/min/1.73sq m Final    Lipase 03/22/2025 51  11 - 82 u/L Final    hs TnI 0hr 03/22/2025 6  \"Refer to ACS Flowchart\"- see link ng/L Final    Comment:                                              Initial (time 0) result  If >=50 ng/L, Myocardial injury suggested ;  Type of myocardial injury and treatment strategy  to be determined.  If 5-49 ng/L, a delta result at 2 hours and or 4 hours will be needed to further evaluate.  If <4 ng/L, and chest pain has been >3 hours since onset, patient may qualify for discharge based on the HEART score in the ED.  If <5 ng/L and <3hours since onset of " chest pain, a delta result at 2 hours will be needed to further evaluate.    HS Troponin 99th Percentile URL of a Health Population=12 ng/L with a 95% Confidence Interval of 8-18 ng/L.    Second Troponin (time 2 hours)  If calculated delta >= 20 ng/L,  Myocardial injury suggested ; Type of myocardial injury and treatment strategy to be determined.  If 5-49 ng/L and the calculated delta is 5-19 ng/L, consult medical service for evaluation.  Continue evaluation for ischemia on ecg and other possible etiology and repeat hs troponin at 4 hours.  If delta                            is <5 ng/L at 2 hours, consider discharge based on risk stratification via the HEART score (if in ED), or KATHIA risk score in IP/Observation.    HS Troponin 99th Percentile URL of a Health Population=12 ng/L with a 95% Confidence Interval of 8-18 ng/L.    Color, UA 03/22/2025 Light Yellow   Final    Clarity, UA 03/22/2025 Clear   Final    Specific Gravity, UA 03/22/2025 1.007  1.003 - 1.030 Final    pH, UA 03/22/2025 5.5  4.5, 5.0, 5.5, 6.0, 6.5, 7.0, 7.5, 8.0 Final    Leukocytes, UA 03/22/2025 Trace (A)  Negative Final    Nitrite, UA 03/22/2025 Negative  Negative Final    Protein, UA 03/22/2025 Negative  Negative mg/dl Final    Glucose, UA 03/22/2025 200 (1/5%) (A)  Negative mg/dl Final    Ketones, UA 03/22/2025 Negative  Negative mg/dl Final    Urobilinogen, UA 03/22/2025 <2.0  <2.0 mg/dl mg/dl Final    Bilirubin, UA 03/22/2025 Negative  Negative Final    Occult Blood, UA 03/22/2025 Negative  Negative Final    LACTIC ACID 03/22/2025 1.6  0.5 - 2.0 mmol/L Final    POC Glucose 03/22/2025 329 (H)  65 - 140 mg/dl Final    POC Glucose 03/22/2025 289 (H)  65 - 140 mg/dl Final    RBC, UA 03/22/2025 1-2  None Seen, 1-2 /hpf Final    WBC, UA 03/22/2025 10-20 (A)  None Seen, 1-2 /hpf Final    Epithelial Cells 03/22/2025 Occasional  None Seen, Occasional /hpf Final    Bacteria, UA 03/22/2025 Occasional  None Seen, Occasional /hpf Final    Hyaline Casts,  UA 03/22/2025 0-3 (A)  None Seen /lpf Final    Urine Culture 03/22/2025 <10,000 cfu/ml Staphylococcus coagulase negative (A)   Final    This organism has been edited. The previous result was Enterococcus faecalis on 3/24/2025 at 1820 EDT.    Urine Culture 03/22/2025 30,000-39,000 cfu/ml Enterococcus faecalis (A)   Final    POC Glucose 03/22/2025 263 (H)  65 - 140 mg/dl Final    Ventricular Rate 03/22/2025 66  BPM Final    Atrial Rate 03/22/2025 66  BPM Final    OR Interval 03/22/2025 188  ms Final    QRSD Interval 03/22/2025 88  ms Final    QT Interval 03/22/2025 408  ms Final    QTC Interval 03/22/2025 427  ms Final    P Axis 03/22/2025 67  degrees Final    QRS Axis 03/22/2025 -36  degrees Final    T Wave Axis 03/22/2025 -11  degrees Final   Admission on 03/05/2025, Discharged on 03/06/2025   Component Date Value Ref Range Status    POC Glucose 03/05/2025 80  65 - 140 mg/dl Final    POC Glucose 03/05/2025 103  65 - 140 mg/dl Final    POC Glucose 03/05/2025 148 (H)  65 - 140 mg/dl Final    POC Glucose 03/06/2025 102  65 - 140 mg/dl Final   Admission on 03/04/2025, Discharged on 03/04/2025   Component Date Value Ref Range Status    Magnesium 03/04/2025 1.7 (L)  1.9 - 2.7 mg/dL Final    Phosphorus 03/04/2025 2.1 (L)  2.3 - 4.1 mg/dL Final    WBC 03/04/2025 10.10  4.31 - 10.16 Thousand/uL Final    RBC 03/04/2025 4.33  3.81 - 5.12 Million/uL Final    Hemoglobin 03/04/2025 11.9  11.5 - 15.4 g/dL Final    Hematocrit 03/04/2025 37.6  34.8 - 46.1 % Final    MCV 03/04/2025 87  82 - 98 fL Final    MCH 03/04/2025 27.5  26.8 - 34.3 pg Final    MCHC 03/04/2025 31.6  31.4 - 37.4 g/dL Final    RDW 03/04/2025 14.7  11.6 - 15.1 % Final    MPV 03/04/2025 10.7  8.9 - 12.7 fL Final    Platelets 03/04/2025 323  149 - 390 Thousands/uL Final    Sodium 03/04/2025 138  135 - 147 mmol/L Final    Potassium 03/04/2025 4.5  3.5 - 5.3 mmol/L Final    Chloride 03/04/2025 104  96 - 108 mmol/L Final    CO2 03/04/2025 23  21 - 32 mmol/L Final     "ANION GAP 03/04/2025 11  4 - 13 mmol/L Final    BUN 03/04/2025 17  5 - 25 mg/dL Final    Creatinine 03/04/2025 1.36 (H)  0.60 - 1.30 mg/dL Final    Standardized to IDMS reference method    Glucose 03/04/2025 193 (H)  65 - 140 mg/dL Final    If the patient is fasting, the ADA then defines impaired fasting glucose as > 100 mg/dL and diabetes as > or equal to 123 mg/dL.    Calcium 03/04/2025 9.7  8.4 - 10.2 mg/dL Final    AST 03/04/2025 20  13 - 39 U/L Final    ALT 03/04/2025 15  7 - 52 U/L Final    Specimen collection should occur prior to Sulfasalazine administration due to the potential for falsely depressed results.     Alkaline Phosphatase 03/04/2025 106 (H)  34 - 104 U/L Final    Total Protein 03/04/2025 8.0  6.4 - 8.4 g/dL Final    Albumin 03/04/2025 3.8  3.5 - 5.0 g/dL Final    Total Bilirubin 03/04/2025 0.61  0.20 - 1.00 mg/dL Final    Use of this assay is not recommended for patients undergoing treatment with eltrombopag due to the potential for falsely elevated results.  N-acetyl-p-benzoquinone imine (metabolite of Acetaminophen) will generate erroneously low results in samples for patients that have taken an overdose of Acetaminophen.    eGFR 03/04/2025 37  ml/min/1.73sq m Final    Lipase 03/04/2025 19  11 - 82 u/L Final    LACTIC ACID 03/04/2025 1.7  0.5 - 2.0 mmol/L Final    pH, Dae 03/04/2025 7.573 (H)  7.300 - 7.400 Final    pCO2, Dae 03/04/2025 24.1 (L)  42.0 - 50.0 mm Hg Final    pO2, Dae 03/04/2025 46.2 (H)  35.0 - 45.0 mm Hg Final    HCO3, Dae 03/04/2025 21.7 (L)  24 - 30 mmol/L Final    Base Excess, Dae 03/04/2025 1.3  mmol/L Final    O2 Content, Dae 03/04/2025 16.1  ml/dL Final    O2 HGB, VENOUS 03/04/2025 85.5 (H)  60.0 - 80.0 % Final    Beta- Hydroxybutyrate 03/04/2025 0.06  0.02 - 0.27 mmol/L Final    POC Glucose 03/04/2025 202 (H)  65 - 140 mg/dl Final    hs TnI 0hr 03/04/2025 5  \"Refer to ACS Flowchart\"- see link ng/L Final    Comment:                                              Initial " (time 0) result  If >=50 ng/L, Myocardial injury suggested ;  Type of myocardial injury and treatment strategy  to be determined.  If 5-49 ng/L, a delta result at 2 hours and or 4 hours will be needed to further evaluate.  If <4 ng/L, and chest pain has been >3 hours since onset, patient may qualify for discharge based on the HEART score in the ED.  If <5 ng/L and <3hours since onset of chest pain, a delta result at 2 hours will be needed to further evaluate.    HS Troponin 99th Percentile URL of a Health Population=12 ng/L with a 95% Confidence Interval of 8-18 ng/L.    Second Troponin (time 2 hours)  If calculated delta >= 20 ng/L,  Myocardial injury suggested ; Type of myocardial injury and treatment strategy to be determined.  If 5-49 ng/L and the calculated delta is 5-19 ng/L, consult medical service for evaluation.  Continue evaluation for ischemia on ecg and other possible etiology and repeat hs troponin at 4 hours.  If delta                            is <5 ng/L at 2 hours, consider discharge based on risk stratification via the HEART score (if in ED), or KATHIA risk score in IP/Observation.    HS Troponin 99th Percentile URL of a Health Population=12 ng/L with a 95% Confidence Interval of 8-18 ng/L.    POC Glucose 03/04/2025 181 (H)  65 - 140 mg/dl Final    Segmented % 03/04/2025 55  43 - 75 % Final    Lymphocytes % 03/04/2025 30  14 - 44 % Final    Monocytes % 03/04/2025 11  4 - 12 % Final    Eosinophils % 03/04/2025 0  0 - 6 % Final    Basophils % 03/04/2025 2 (H)  0 - 1 % Final    Atypical Lymphocytes % 03/04/2025 2 (H)  <=0 % Final    Absolute Neutrophils 03/04/2025 5.56  1.85 - 7.62 Thousand/uL Final    Absolute Lymphocytes 03/04/2025 3.23  0.60 - 4.47 Thousand/uL Final    Absolute Monocytes 03/04/2025 1.11  0.00 - 1.22 Thousand/uL Final    Absolute Eosinophils 03/04/2025 0.00  0.00 - 0.40 Thousand/uL Final    Absolute Basophils 03/04/2025 0.20 (H)  0.00 - 0.10 Thousand/uL Final    RBC Morphology  03/04/2025 Normal   Final    Platelet Estimate 03/04/2025 Adequate  Adequate Final    POC Glucose 03/04/2025 141 (H)  65 - 140 mg/dl Final   Refill on 02/24/2025   Component Date Value Ref Range Status    Ventricular Rate 03/04/2025 77  BPM Final    Atrial Rate 03/04/2025 77  BPM Final    MD Interval 03/04/2025 158  ms Final    QRSD Interval 03/04/2025 82  ms Final    QT Interval 03/04/2025 402  ms Final    QTC Interval 03/04/2025 454  ms Final    P Axis 03/04/2025 63  degrees Final    QRS [place] 03/04/2025 -20  degrees Final    T Wave Axis 03/04/2025 -13  degrees Final   Office Visit on 02/17/2025   Component Date Value Ref Range Status    Supplier Name 02/17/2025 [employer]   In process    Supplier Phone Number 02/17/2025 [phone]   In process    Order Status 02/17/2025 Processing   In process    Delivery Request Date 02/17/2025 02/17/2025   In process    Item Description 02/17/2025 Dexcom G7    In process    Comment: Qty: 1  Allow Transmitter, , and/or Sensor Model Substitution: Yes  CGM Status: They have used a CGM device in the past but are not currently   using  Directions for CGM use: Use per  directions      Item Description 02/17/2025 Dexcom G7 Sensor (All-in-One), Change Every 10 Days, Pack (1)   In process    Comment: Qty: 9  Day Supply: 90  Allow Transmitter, , and/or Sensor Model Substitution: Yes  CGM Status: They have used a CGM device in the past but are not currently   using  Directions for CGM use: Use per  directions     No results displayed because visit has over 200 results.      Office Visit on 02/10/2025   Component Date Value Ref Range Status    Hemoglobin A1C 02/10/2025 12.4 (A)  <=6.5 Final   There may be more visits with results that are not included.       Suicide/Homicide Risk Assessment:  The following interventions are recommended: no intervention changes needed. Although patient's acute lethality risk is low, long-term/chronic  "lethality risk is mildly elevated in the presence of the above. At the current moment, pt is future-oriented, forward-thinking, and demonstrates ability to act in a self-preserving manner as evidenced by volitionally presenting to the clinic today, seeking treatment. Additionally, pt sits throughout the assessment wearing personal protective gear (i.e. medical mask) in the context of the ongoing COVID-19 pandemic, suggesting a will and desire to live. At this juncture, inpatient hospitalization is not currently warranted. To mitigate future risk, patient should adhere to the recommendations of this writer, avoid alcohol/illicit substance use, utilize community-based resources and familiar support and prioritize mental health treatment.      Based on today's assessment and clinical criteria, pt contracts for safety and is not an imminent risk of harm to self or others. Outpatient level of care is deemed appropriate at this present time. Pt understands that if they are no longer able to contract for safety, they need to call/contact the outpatient office including this writer and call/contact crisis and/or attend to the nearest Emergency Department for immediate evaluation.     Assessment/Plan:   On assessment, Yajaira Sergioelaine *** reports symptoms consistent with *** characterized by ***.    Patient reported adherence to current psychotropic medication regimen and tolerating medication well without any significant side effects.    Patient is in agreement with the treatment plan as detailed below, and agrees to call the office with any concerns or side effects between appointments.     Presently, patient adamantly {EFO Amb Denies/Reports:73847::\"denies\"} {EFO Amb SI/HI:21294::\"suicidal ideation, intent or plan at present\"} in addition to thoughts of self-injury, citing *** as deterrents against self-harm; contracts for safety, see risk assessment for further detail. She {EFO Amb Denies/Reports:12291::\"denies\"} {EFO " "Amb HI  ROS:61136::\"homicidal ideation, intent or plan at present\"}. At conclusion of evaluation, patient is amenable to the recommendations of this writer including: medications as prescribed, attending routine appointments.  Also, patient is amenable to calling/contacting the outpatient office including this writer if any acute adverse effects of their medication regimen arise in addition to any comments or concerns pertaining to their psychiatric management.  Patient is amenable to calling/contacting crisis and/or attending to the nearest emergency department if their clinical condition deteriorates to assure their safety and stability, stating that they are able to appropriately confide in their *** regarding their psychiatric state.    DSM-5 Diagnoses:   Assessment & Plan      Treatment Recommendations/Precautions:  Labs most recently obtained ***, reviewed.   Follow up in *** for medication management  Follow up with PCP for medical issues and ongoing care  Aware of 24 hour and weekend coverage for urgent situations accessed by calling [facility] main practice number    Controlled Medication Discussion:     {EFO AMB PSYCH CONTROLLED MED DISCUSSION:66958::\"Not applicable\"}    Treatment Plan:    Completed and signed during the session: {EFO Treatment Plan Session:04754::\"Not applicable - Treatment Plan not due at this session\"}      Visit Time    Visit Start Time: ***  Visit Stop Time: ***  Total Visit Duration: *** minutes    Sammi Hernandez MD   04/28/25    "

## 2025-04-28 NOTE — PSYCH
" PSYCHIATRIC EVALUATION     [facility]    Name and Date of Birth:  Yajaira Marsh 79 y.o. 1945 MRN: [id number]    Date of Visit: April 28, 2025    Reason for visit: Comprehensive psychiatric assessment for medication management     Chief Complaint: \"sleep and appetite\"    HPI     Yajaira Marsh is a 79 y.o. Female, , with 1 daughter and 1 son, living with  in a 55+ living community, on SSI (worked as a ), with past medical history of CAD, HLD, DM, hypothyroidism, CKD, SARAY, h/o DVT and fall, and past psychiatric history of MADAN with anxiety attacks and MDD (Psychiatric Hospitalizations: 3/24-3/28/2025, Outpatient Treatment History: previously in outpatient psychiatric care with Dr. Miller at [facility] ([phone]), Suicide Attempts: no, History of non-suicidal self injury: no, Violence History: no), who presents to the [facility] outpatient clinic for comprehensive psychiatric assessment.    Current medication regimen includes venlafaxine 75mg QD (started at recent admission)    Pt was admitted to the inpatient psychiatric unit for depression 3/24-3/28/2025. Feels that things have been getting worse since slew of hospitalizations starting 11/1/2024 followed by 3 other admissions for various things. Endorses depression, anxiety, decreased appetite, insomnia, weakness, and weight loss. Symptoms have worsened since hospital discharge in March. Feeling physically weaker since hospital discharge. Difficulty completing household tasks and managing bills due to lack of strength and increased stress.    Psychiatric Review Of Systems:    Sleep change: decreased    Appetite change: decreased, unable to eat due to decreased appeal, altered taste, and texture aversions to food.  Weight changes & timeframe: Lost 40 lbs since early February, followed by several hospitalizations for various reasons including pneumonia, " dehydration  Eating Disorder symptoms: No history of disordered eating.     Interest change: decreased  Interests include: sew, mend clothes, reading  Guilt/Hopelessness/helplessness/worthlessness: no  Energy change: decreased  Concentration/Attention change: decreased since discharge  Psychomotor agitation/retardation: no  Somatic symptoms: no  Suicidal ideation: adamantly denies, very afraid to die  Homicidal ideation: no  Katherine/hypomania: no    Anxiety/panic attack: yes, irritable  MADAN symptoms: no symptoms suggestive of MADAN  Panic Disorder symptoms: starts getting panicky when it gets dark and not being able to sleep  Social Anxiety symptoms: Denies  Obsessive/compulsive symptoms: no    Auditory hallucinations: no  Visual hallucinations: no  Other perceptual disturbances: no  Delusional thinking: no    Review Of Systems:  Complete review of systems is negative except as noted above.     Current Rating Scores:     Not completed.    Past Psychiatric History:     Past psychiatric diagnoses:   MDD, MADAN with panic attacks  Inpatient psychiatric admissions:   201 3/24-3/28/2025 for anxiety  Prior outpatient psychiatric treatment:   Dr. Miller at [facility] ([phone])  Past/current psychotherapy:   Group therapy   History of suicidal attempts/gestures:   no   History of non-suicidal self-injurious behavior:   no  History of violence/aggressive behaviors:   no  Psychotropic medication trials:   Antidepressants:  Duloxetine 90mg (GI upset)  Zoloft 100mg for a few years, no side effects, not sure if helped  Anxiolytics:  Alprazolam 0.5 mg BID for decades  Buspirone  Others:  Gabapentin  Trazodone was previously prescribed for sleep but discontinued due to suspected nosebleeds.  Substance abuse inpatient/outpatient rehabilitation:   no  Eating disorder history:   no    Substance Abuse History:  Former smoker    Denies history of alcohol, illict substance, or tobacco abuse., Patient denies previous legal actions or arrests  "related to substance intoxication including prior DWIs/DUIs., Patient does not exhibit objective evidence of substance withdrawal during today's examination nor do they appear under the influence of any psychoactive substance.      Family Psychiatric History:   Psychiatric Family History: mother and daughter with anxiety and depression  Suicide Attempts: none  Patient otherwise denies known family history of psychiatric illness, substance use, or suicide attempts.    Social History:    Developmental: The patient grew up in [place].  Childhood was described as \"happy\".During childhood, parents were together. Only child    Education: some college  Marital history:   Children: one daughter and one son  Living arrangement, social support: with   Occupational History: retired,  for 30 years  Buddhist Affiliation: Quaker  Access to firearms:  has a 22 rifle   history: None    Traumatic History:     Abuse: none  Other Traumatic Events: none    Past Medical History:    Past Medical History:   Diagnosis Date    Acute embolism and thrombosis of unspecified deep veins of unspecified lower extremity (HCC)     Last Assessed:  5/18/17    Anemia     Anosmia     Anxiety     Arthritis     Asthma     Back pain     Bilateral macular retinal edema     CAD (coronary artery disease)     Cataract     Cervical disc herniation     Cervical radiculopathy     Cervical spinal stenosis     Cervical spondylolysis     Chronic kidney disease     Chronic mastoiditis     Colon polyp     Complex endometrial hyperplasia     Depression     Diabetes mellitus (HCC)     Disease of thyroid gland     DVT (deep venous thrombosis) ([facility])     DVT (deep venous thrombosis) ([facility]) 06/16/2017    Fibromyalgia     Fibromyalgia, primary     Hyperlipidemia     Hypertension     Hypothyroid     Kidney stone     Lumbar radiculopathy     Neck pain     Obese     PONV (postoperative nausea and vomiting)     Shingles 07/01/2021    " Spinal stenosis     Stomach ulcer     Thyroid disease     Toxic metabolic encephalopathy 02/11/2025    Vascular claudication (HCC) 12/11/2017        Past Surgical History:   Procedure Laterality Date    BACK SURGERY      CARPAL TUNNEL RELEASE      CATARACT EXTRACTION      CHOLECYSTECTOMY      COLONOSCOPY      CORONARY ANGIOPLASTY      CORONARY ARTERY BYPASS GRAFT      CYSTOSCOPY N/A 6/20/2017    Procedure: CYSTOSCOPY;  Surgeon: Tacho Arnold MD;  Location: [facility];  Service: Gynecology Oncology    CYSTOSCOPY      AK LAPS TOTAL HYSTERECT 250 GM/< W/RMVL TUBE/OVARY N/A 6/20/2017    Procedure: ROBOTIC HYSTERECTOMY; BILATERAL SALPINO-OOPHERECTOMY; umbilical hernia repair.;  Surgeon: Tacho Arnold MD;  Location: [facility];  Service: Gynecology Oncology    AK REPAIR FIRST ABDOMINAL WALL HERNIA N/A 5/12/2022    Procedure: REPAIR HERNIA INCISIONAL;  Surgeon: Horacio Scherer DO;  Location: [facility];  Service: General    TONSILECTOMY AND ADNOIDECTOMY      TONSILLECTOMY      UMBILICAL HERNIA REPAIR       Allergies   Allergen Reactions    Bee Pollen Other (See Comments)    Cat Dander Other (See Comments)    Dog Epithelium (Canis Lupus Familiaris) Other (See Comments)    Molds & Smuts Allergic Rhinitis    Other Allergic Rhinitis     RAGWEED, CAT DANDER, DOG DANDER     Short Ragweed Pollen Ext Other (See Comments)    Pollen Extract Other (See Comments)     Cold symptoms         History Review:    The following portions of the patient's history were reviewed and updated as appropriate: allergies, current medications, past family history, past medical history, past social history, past surgical history, and problem list.    OBJECTIVE:    Vital signs in last 24 hours:    There were no vitals filed for this visit.    Mental Status Evaluation:    Appearance age appropriate, casually dressed, appropriate hygeine   Behavior cooperative, calm   Speech normal rate, normal volume, normal pitch   Mood euthymic   Affect  normal range and intensity, appropriate   Thought Processes organized, goal directed   Associations intact associations   Thought Content no overt delusions   Perceptual Disturbances: Does not appear to be responding to internal stimuli   Abnormal Thoughts  Risk Potential Denies suicidal or homicidal ideation, plan, or intent   Orientation oriented to person, place, time/date, and situation   Memory recent and remote memory grossly intact   Consciousness alert and awake   Attention Span Concentration Span attention span and concentration are age appropriate   Intellect appears to be of average intelligence   Insight intact   Judgement intact   Muscle Strength and  Gait normal muscle strength and normal muscle tone, normal gait and normal balance   Motor Activity no abnormal movements   Language no difficulty naming common objects, no difficulty repeating a phrase, no difficulty writing a sentence   Fund of Knowledge adequate knowledge of current events  adequate fund of knowledge regarding past history  adequate fund of knowledge regarding vocabulary        Laboratory Results: I have personally reviewed all pertinent laboratory/tests results    Admission on 04/23/2025, Discharged on 04/23/2025   Component Date Value Ref Range Status    Ventricular Rate 04/23/2025 92  BPM Final    Atrial Rate 04/23/2025 92  BPM Final    PA Interval 04/23/2025 150  ms Final    QRSD Interval 04/23/2025 88  ms Final    QT Interval 04/23/2025 364  ms Final    QTC Interval 04/23/2025 450  ms Final    P Axis 04/23/2025 73  degrees Final    QRS Axis 04/23/2025 -51  degrees Final    T Wave Perry 04/23/2025 58  degrees Final    WBC 04/23/2025 8.99  4.31 - 10.16 Thousand/uL Final    RBC 04/23/2025 4.59  3.81 - 5.12 Million/uL Final    Hemoglobin 04/23/2025 12.7  11.5 - 15.4 g/dL Final    Hematocrit 04/23/2025 39.1  34.8 - 46.1 % Final    MCV 04/23/2025 85  82 - 98 fL Final    MCH 04/23/2025 27.7  26.8 - 34.3 pg Final    MCHC 04/23/2025 32.5   31.4 - 37.4 g/dL Final    RDW 04/23/2025 14.4  11.6 - 15.1 % Final    MPV 04/23/2025 11.9  8.9 - 12.7 fL Final    Platelets 04/23/2025 234  149 - 390 Thousands/uL Final    nRBC 04/23/2025 0  /100 WBCs Final    Segmented % 04/23/2025 65  43 - 75 % Final    Immature Grans % 04/23/2025 0  0 - 2 % Final    Lymphocytes % 04/23/2025 27  14 - 44 % Final    Monocytes % 04/23/2025 8  4 - 12 % Final    Eosinophils Relative 04/23/2025 0  0 - 6 % Final    Basophils Relative 04/23/2025 0  0 - 1 % Final    Absolute Neutrophils 04/23/2025 5.80  1.85 - 7.62 Thousands/µL Final    Absolute Immature Grans 04/23/2025 0.01  0.00 - 0.20 Thousand/uL Final    Absolute Lymphocytes 04/23/2025 2.45  0.60 - 4.47 Thousands/µL Final    Absolute Monocytes 04/23/2025 0.68  0.17 - 1.22 Thousand/µL Final    Eosinophils Absolute 04/23/2025 0.01  0.00 - 0.61 Thousand/µL Final    Basophils Absolute 04/23/2025 0.04  0.00 - 0.10 Thousands/µL Final    Sodium 04/23/2025 140  135 - 147 mmol/L Final    Potassium 04/23/2025 4.1  3.5 - 5.3 mmol/L Final    Chloride 04/23/2025 105  96 - 108 mmol/L Final    CO2 04/23/2025 20 (L)  21 - 32 mmol/L Final    ANION GAP 04/23/2025 15 (H)  4 - 13 mmol/L Final    BUN 04/23/2025 16  5 - 25 mg/dL Final    Creatinine 04/23/2025 1.44 (H)  0.60 - 1.30 mg/dL Final    Standardized to IDMS reference method    Glucose 04/23/2025 193 (H)  65 - 140 mg/dL Final    If the patient is fasting, the ADA then defines impaired fasting glucose as > 100 mg/dL and diabetes as > or equal to 123 mg/dL.    Calcium 04/23/2025 10.2  8.4 - 10.2 mg/dL Final    AST 04/23/2025 20  13 - 39 U/L Final    ALT 04/23/2025 15  7 - 52 U/L Final    Specimen collection should occur prior to Sulfasalazine administration due to the potential for falsely depressed results.     Alkaline Phosphatase 04/23/2025 88  34 - 104 U/L Final    Total Protein 04/23/2025 7.7  6.4 - 8.4 g/dL Final    Albumin 04/23/2025 3.9  3.5 - 5.0 g/dL Final    Total Bilirubin 04/23/2025 0.71  " 0.20 - 1.00 mg/dL Final    Use of this assay is not recommended for patients undergoing treatment with eltrombopag due to the potential for falsely elevated results.  N-acetyl-p-benzoquinone imine (metabolite of Acetaminophen) will generate erroneously low results in samples for patients that have taken an overdose of Acetaminophen.    eGFR 04/23/2025 34  ml/min/1.73sq m Final    hs TnI 0hr 04/23/2025 13  \"Refer to ACS Flowchart\"- see link ng/L Final    Comment:                                              Initial (time 0) result  If >=50 ng/L, Myocardial injury suggested ;  Type of myocardial injury and treatment strategy  to be determined.  If 5-49 ng/L, a delta result at 2 hours and or 4 hours will be needed to further evaluate.  If <4 ng/L, and chest pain has been >3 hours since onset, patient may qualify for discharge based on the HEART score in the ED.  If <5 ng/L and <3hours since onset of chest pain, a delta result at 2 hours will be needed to further evaluate.    HS Troponin 99th Percentile URL of a Health Population=12 ng/L with a 95% Confidence Interval of 8-18 ng/L.    Second Troponin (time 2 hours)  If calculated delta >= 20 ng/L,  Myocardial injury suggested ; Type of myocardial injury and treatment strategy to be determined.  If 5-49 ng/L and the calculated delta is 5-19 ng/L, consult medical service for evaluation.  Continue evaluation for ischemia on ecg and other possible etiology and repeat hs troponin at 4 hours.  If delta                            is <5 ng/L at 2 hours, consider discharge based on risk stratification via the HEART score (if in ED), or KATHIA risk score in IP/Observation.    HS Troponin 99th Percentile URL of a Health Population=12 ng/L with a 95% Confidence Interval of 8-18 ng/L.    SARS COV Rapid Antigen 04/23/2025 Negative  Negative Final    Influenza A Rapid Antigen 04/23/2025 Negative  Negative Final    Influenza B Rapid Antigen 04/23/2025 Negative  Negative Final    " "D-Dimer, Quant 04/23/2025 1.51 (H)  <0.50 ug/ml FEU Final    Reference and upper limits to exclude DVT and PE are the same.  Do not use to exclude if clinical symptoms are present.  Pregnant women:  1st trimester:  <0.22 - 1.06 ug/ml FEU  2nd trimester:  <0.22 - 1.88 ug/ml FEU  3rd trimester:   0.24 - 3.28 ug/ml FEU    Note: Normal ranges may not apply to patients who are transgender, non-binary, or whose legal sex, sex at birth, and gender identity differ.      hs TnI 2hr 04/23/2025 12  \"Refer to ACS Flowchart\"- see link ng/L Final    Comment:                                              Initial (time 0) result  If >=50 ng/L, Myocardial injury suggested ;  Type of myocardial injury and treatment strategy  to be determined.  If 5-49 ng/L, a delta result at 2 hours and or 4 hours will be needed to further evaluate.  If <4 ng/L, and chest pain has been >3 hours since onset, patient may qualify for discharge based on the HEART score in the ED.  If <5 ng/L and <3hours since onset of chest pain, a delta result at 2 hours will be needed to further evaluate.    HS Troponin 99th Percentile URL of a Health Population=12 ng/L with a 95% Confidence Interval of 8-18 ng/L.    Second Troponin (time 2 hours)  If calculated delta >= 20 ng/L,  Myocardial injury suggested ; Type of myocardial injury and treatment strategy to be determined.  If 5-49 ng/L and the calculated delta is 5-19 ng/L, consult medical service for evaluation.  Continue evaluation for ischemia on ecg and other possible etiology and repeat hs troponin at 4 hours.  If delta                            is <5 ng/L at 2 hours, consider discharge based on risk stratification via the HEART score (if in ED), or KATHIA risk score in IP/Observation.    HS Troponin 99th Percentile URL of a Health Population=12 ng/L with a 95% Confidence Interval of 8-18 ng/L.    Delta 2hr hsTnI 04/23/2025 -1  <20 ng/L Final    ph, Dae ISTAT 04/23/2025 7.558 (H)  7.300 - 7.400 Final    pCO2, " Dae i-STAT 04/23/2025 18.1 (LL)  42.0 - 50.0 mm HG Final    pO2, Dae i-STAT 04/23/2025 34.0 (L)  35.0 - 45.0 mm HG Final    BE, i-STAT 04/23/2025 -4 (L)  -2 - 3 mmol/L Final    HCO3, Dae i-STAT 04/23/2025 16.2 (LL)  24.0 - 30.0 mmol/L Final    CO2, i-STAT 04/23/2025 17 (L)  21 - 32 mmol/L Final    O2 Sat, i-STAT 04/23/2025 76  60 - 85 % Final    SODIUM, I-STAT 04/23/2025 140  136 - 145 mmol/l Final    Potassium, i-STAT 04/23/2025 3.3 (L)  3.5 - 5.3 mmol/L Final    Calcium, Ionized i-STAT 04/23/2025 1.11 (L)  1.12 - 1.32 mmol/L Final    Hct, i-STAT 04/23/2025 31 (L)  34.8 - 46.1 % Final    Hgb, i-STAT 04/23/2025 10.5 (L)  11.5 - 15.4 g/dl Final    Glucose, i-STAT 04/23/2025 159 (H)  65 - 140 mg/dl Final    Specimen Type 04/23/2025 VENOUS   Final   Admission on 03/24/2025, Discharged on 03/28/2025   Component Date Value Ref Range Status    POC Glucose 03/24/2025 230 (H)  65 - 140 mg/dl Final    POC Glucose 03/24/2025 182 (H)  65 - 140 mg/dl Final    Sodium 03/25/2025 142  135 - 147 mmol/L Final    Potassium 03/25/2025 3.5  3.5 - 5.3 mmol/L Final    Chloride 03/25/2025 113 (H)  96 - 108 mmol/L Final    CO2 03/25/2025 19 (L)  21 - 32 mmol/L Final    ANION GAP 03/25/2025 10  4 - 13 mmol/L Final    BUN 03/25/2025 20  5 - 25 mg/dL Final    Creatinine 03/25/2025 2.22 (H)  0.60 - 1.30 mg/dL Final    Standardized to IDMS reference method    Glucose 03/25/2025 169 (H)  65 - 140 mg/dL Final    If the patient is fasting, the ADA then defines impaired fasting glucose as > 100 mg/dL and diabetes as > or equal to 123 mg/dL.    Glucose, Fasting 03/25/2025 169 (H)  65 - 99 mg/dL Final    Calcium 03/25/2025 9.0  8.4 - 10.2 mg/dL Final    eGFR 03/25/2025 20  ml/min/1.73sq m Final    Phosphorus 03/25/2025 4.2 (H)  2.3 - 4.1 mg/dL Final    Magnesium 03/25/2025 1.3 (L)  1.9 - 2.7 mg/dL Final    Cholesterol 03/25/2025 107  See Comment mg/dL Final    Cholesterol:         Pediatric <18 Years        Desirable          <170 mg/dL       Borderline High    170-199 mg/dL      High               >=200 mg/dL        Adult >=18 Years            Desirable         <200 mg/dL      Borderline High   200-239 mg/dL      High              >239 mg/dL      Triglycerides 03/25/2025 143  See Comment mg/dL Final    Triglyceride:     0-9Y            <75mg/dL     10Y-17Y         <90 mg/dL       >=18Y     Normal          <150 mg/dL     Borderline High 150-199 mg/dL     High            200-499 mg/dL        Very High       >499 mg/dL    Specimen collection should occur prior to Metamizole administration due to the potential for falsely depressed results.    HDL, Direct 03/25/2025 32 (L)  >=50 mg/dL Final    LDL Calculated 03/25/2025 46  0 - 100 mg/dL Final    LDL Cholesterol:     Optimal           <100 mg/dl     Near Optimal      100-129 mg/dl     Above Optimal       Borderline High 130-159 mg/dl       High            160-189 mg/dl       Very High       >189 mg/dl         This screening LDL is a calculated result.   It does not have the accuracy of the Direct Measured LDL in the monitoring of patients with hyperlipidemia and/or statin therapy.   Direct Measure LDL (JMV633) must be ordered separately in these patients.    Non-HDL-Chol (CHOL-HDL) 03/25/2025 75  mg/dl Final    Folate 03/25/2025 10.9  >5.9 ng/mL Final    The World Health Organization has determined deficient folate concentrations are considered to be <4.0 ng/mL.    Treponema Pallidium Total Antibodi* 03/26/2025 Non-reactive  Non-reactive Final    No serological evidence of infection with T. pallidum.  Early or incubating syphilis infection cannot be excluded.    Consider repeat testing based on clinical suspicion.      Vitamin B-12 03/25/2025 144 (L)  180 - 914 pg/mL Final    Vit D, 25-Hydroxy 03/25/2025 36.5  30.0 - 100.0 ng/mL Final    Vitamin D guidelines established by Clinical Guidelines Subcommittee  of the Endocrine Society Task Force, 2011    Deficiency <20ng/ml   Insufficiency 20-30ng/ml    Sufficient  ng/ml     HIV AB/AG HT Inter 03/26/2025 Non-Reactive  Non-Reactive Final      Sample was found to be negative for HIV p24 antigen and HIV specific antibodies. A non-reactive result does not exclude the possibility of exposure to or infection with HIV. Non-Reactive results can occur if the quantity of marker present is too low or if the marker is not present during the stage of disease at the time of collection. Repeat testing should be considered where there is clinical suspicion of infection.    POC Glucose 03/25/2025 218 (H)  65 - 140 mg/dl Final    POC Glucose 03/25/2025 166 (H)  65 - 140 mg/dl Final    POC Glucose 03/25/2025 178 (H)  65 - 140 mg/dl Final    Sodium 03/26/2025 139  135 - 147 mmol/L Final    Potassium 03/26/2025 3.1 (L)  3.5 - 5.3 mmol/L Final    Chloride 03/26/2025 109 (H)  96 - 108 mmol/L Final    CO2 03/26/2025 19 (L)  21 - 32 mmol/L Final    ANION GAP 03/26/2025 11  4 - 13 mmol/L Final    BUN 03/26/2025 18  5 - 25 mg/dL Final    Creatinine 03/26/2025 1.78 (H)  0.60 - 1.30 mg/dL Final    Standardized to IDMS reference method    Glucose 03/26/2025 197 (H)  65 - 140 mg/dL Final    If the patient is fasting, the ADA then defines impaired fasting glucose as > 100 mg/dL and diabetes as > or equal to 123 mg/dL.    Glucose, Fasting 03/26/2025 197 (H)  65 - 99 mg/dL Final    Calcium 03/26/2025 8.4  8.4 - 10.2 mg/dL Final    eGFR 03/26/2025 26  ml/min/1.73sq m Final    Magnesium 03/26/2025 1.2 (L)  1.9 - 2.7 mg/dL Final    Phosphorus 03/26/2025 3.4  2.3 - 4.1 mg/dL Final    POC Glucose 03/26/2025 145 (H)  65 - 140 mg/dl Final    POC Glucose 03/26/2025 190 (H)  65 - 140 mg/dl Final    POC Glucose 03/26/2025 217 (H)  65 - 140 mg/dl Final    POC Glucose 03/26/2025 153 (H)  65 - 140 mg/dl Final    Sodium 03/27/2025 141  135 - 147 mmol/L Final    Potassium 03/27/2025 3.8  3.5 - 5.3 mmol/L Final    Chloride 03/27/2025 112 (H)  96 - 108 mmol/L Final    CO2 03/27/2025 21  21 - 32 mmol/L  Final    ANION GAP 03/27/2025 8  4 - 13 mmol/L Final    BUN 03/27/2025 16  5 - 25 mg/dL Final    Creatinine 03/27/2025 1.43 (H)  0.60 - 1.30 mg/dL Final    Standardized to IDMS reference method    Glucose 03/27/2025 87  65 - 140 mg/dL Final    If the patient is fasting, the ADA then defines impaired fasting glucose as > 100 mg/dL and diabetes as > or equal to 123 mg/dL.    Glucose, Fasting 03/27/2025 87  65 - 99 mg/dL Final    Calcium 03/27/2025 8.5  8.4 - 10.2 mg/dL Final    eGFR 03/27/2025 35  ml/min/1.73sq m Final    Magnesium 03/27/2025 1.4 (L)  1.9 - 2.7 mg/dL Final    POC Glucose 03/27/2025 93  65 - 140 mg/dl Final    POC Glucose 03/27/2025 155 (H)  65 - 140 mg/dl Final    POC Glucose 03/27/2025 182 (H)  65 - 140 mg/dl Final    CRITICAL VALUE NOTED-    POC Glucose 03/27/2025 172 (H)  65 - 140 mg/dl Final    POC Glucose 03/28/2025 133  65 - 140 mg/dl Final   Admission on 03/23/2025, Discharged on 03/24/2025   Component Date Value Ref Range Status    Color, UA 03/23/2025 Colorless   Final    Clarity, UA 03/23/2025 Clear   Final    Specific Gravity, UA 03/23/2025 1.004  1.003 - 1.030 Final    pH, UA 03/23/2025 5.5  4.5, 5.0, 5.5, 6.0, 6.5, 7.0, 7.5, 8.0 Final    Leukocytes, UA 03/23/2025 Negative  Negative Final    Nitrite, UA 03/23/2025 Negative  Negative Final    Protein, UA 03/23/2025 Negative  Negative mg/dl Final    Glucose, UA 03/23/2025 Trace (A)  Negative mg/dl Final    Ketones, UA 03/23/2025 Negative  Negative mg/dl Final    Urobilinogen, UA 03/23/2025 <2.0  <2.0 mg/dl mg/dl Final    Bilirubin, UA 03/23/2025 Negative  Negative Final    Occult Blood, UA 03/23/2025 Negative  Negative Final    Sodium 03/23/2025 138  135 - 147 mmol/L Final    Potassium 03/23/2025 3.9  3.5 - 5.3 mmol/L Final    Chloride 03/23/2025 111 (H)  96 - 108 mmol/L Final    CO2 03/23/2025 18 (L)  21 - 32 mmol/L Final    ANION GAP 03/23/2025 9  4 - 13 mmol/L Final    BUN 03/23/2025 16  5 - 25 mg/dL Final    Creatinine 03/23/2025 1.31  (H)  0.60 - 1.30 mg/dL Final    Standardized to IDMS reference method    Glucose 03/23/2025 189 (H)  65 - 140 mg/dL Final    If the patient is fasting, the ADA then defines impaired fasting glucose as > 100 mg/dL and diabetes as > or equal to 123 mg/dL.    Calcium 03/23/2025 9.3  8.4 - 10.2 mg/dL Final    AST 03/23/2025 18  13 - 39 U/L Final    ALT 03/23/2025 18  7 - 52 U/L Final    Specimen collection should occur prior to Sulfasalazine administration due to the potential for falsely depressed results.     Alkaline Phosphatase 03/23/2025 98  34 - 104 U/L Final    Total Protein 03/23/2025 7.3  6.4 - 8.4 g/dL Final    Albumin 03/23/2025 4.0  3.5 - 5.0 g/dL Final    Total Bilirubin 03/23/2025 0.74  0.20 - 1.00 mg/dL Final    Use of this assay is not recommended for patients undergoing treatment with eltrombopag due to the potential for falsely elevated results.  N-acetyl-p-benzoquinone imine (metabolite of Acetaminophen) will generate erroneously low results in samples for patients that have taken an overdose of Acetaminophen.    eGFR 03/23/2025 39  ml/min/1.73sq m Final    WBC 03/23/2025 9.17  4.31 - 10.16 Thousand/uL Final    RBC 03/23/2025 4.39  3.81 - 5.12 Million/uL Final    Hemoglobin 03/23/2025 12.0  11.5 - 15.4 g/dL Final    Hematocrit 03/23/2025 36.2  34.8 - 46.1 % Final    MCV 03/23/2025 83  82 - 98 fL Final    MCH 03/23/2025 27.3  26.8 - 34.3 pg Final    MCHC 03/23/2025 33.1  31.4 - 37.4 g/dL Final    RDW 03/23/2025 14.6  11.6 - 15.1 % Final    MPV 03/23/2025 11.4  8.9 - 12.7 fL Final    Platelets 03/23/2025 245  149 - 390 Thousands/uL Final    nRBC 03/23/2025 0  /100 WBCs Final    Segmented % 03/23/2025 67  43 - 75 % Final    Immature Grans % 03/23/2025 0  0 - 2 % Final    Lymphocytes % 03/23/2025 25  14 - 44 % Final    Monocytes % 03/23/2025 7  4 - 12 % Final    Eosinophils Relative 03/23/2025 1  0 - 6 % Final    Basophils Relative 03/23/2025 0  0 - 1 % Final    Absolute Neutrophils 03/23/2025 6.11  1.85  - 7.62 Thousands/µL Final    Absolute Immature Grans 03/23/2025 0.04  0.00 - 0.20 Thousand/uL Final    Absolute Lymphocytes 03/23/2025 2.26  0.60 - 4.47 Thousands/µL Final    Absolute Monocytes 03/23/2025 0.63  0.17 - 1.22 Thousand/µL Final    Eosinophils Absolute 03/23/2025 0.10  0.00 - 0.61 Thousand/µL Final    Basophils Absolute 03/23/2025 0.03  0.00 - 0.10 Thousands/µL Final    TSH 3RD GENERATON 03/23/2025 5.080 (H)  0.450 - 4.500 uIU/mL Final    The recommended reference ranges for TSH during pregnancy are as follows:   First trimester 0.100 to 2.500 uIU/mL   Second trimester  0.200 to 3.000 uIU/mL   Third trimester 0.300 to 3.000 uIU/m    Note: Normal ranges may not apply to patients who are transgender, non-binary, or whose legal sex, sex at birth, and gender identity differ.  Adult TSH (3rd generation) reference range follows the recommended guidelines of the American Thy[employer] Association, January, 2020.    Ethanol Lvl 03/23/2025 19 (H)  <10 mg/dL Final    Salicylate Lvl 03/23/2025 <5  3 - 20 mg/dL Final    Acetaminophen Level 03/23/2025 <2 (L)  10 - 20 ug/mL Final    POC Glucose 03/23/2025 178 (H)  65 - 140 mg/dl Final    Free T4 03/23/2025 0.80  0.61 - 1.12 ng/dL Final    Specimens with biotin concentrations > 10 ng/mL can lead to significant (> 10%) positive bias in result.    Sodium 03/24/2025 140  135 - 147 mmol/L Final    Potassium 03/24/2025 3.3 (L)  3.5 - 5.3 mmol/L Final    Chloride 03/24/2025 114 (H)  96 - 108 mmol/L Final    CO2 03/24/2025 18 (L)  21 - 32 mmol/L Final    ANION GAP 03/24/2025 8  4 - 13 mmol/L Final    BUN 03/24/2025 14  5 - 25 mg/dL Final    Creatinine 03/24/2025 1.38 (H)  0.60 - 1.30 mg/dL Final    Standardized to IDMS reference method    Glucose 03/24/2025 217 (H)  65 - 140 mg/dL Final    If the patient is fasting, the ADA then defines impaired fasting glucose as > 100 mg/dL and diabetes as > or equal to 123 mg/dL.    Calcium 03/24/2025 9.4  8.4 - 10.2 mg/dL Final    eGFR  03/24/2025 36  ml/min/1.73sq m Final    POC Glucose 03/24/2025 205 (H)  65 - 140 mg/dl Final    POC Glucose 03/24/2025 189 (H)  65 - 140 mg/dl Final    Ventricular Rate 03/23/2025 62  BPM Final    Atrial Rate 03/23/2025 62  BPM Final    NJ Interval 03/23/2025 182  ms Final    QRSD Interval 03/23/2025 92  ms Final    QT Interval 03/23/2025 434  ms Final    QTC Interval 03/23/2025 440  ms Final    P Axis 03/23/2025 65  degrees Final    QRS Axis 03/23/2025 -29  degrees Final    T Wave [place] 03/23/2025 -4  degrees Final    POC Glucose 03/24/2025 216 (H)  65 - 140 mg/dl Final   Admission on 03/22/2025, Discharged on 03/22/2025   Component Date Value Ref Range Status    WBC 03/22/2025 9.08  4.31 - 10.16 Thousand/uL Final    RBC 03/22/2025 4.39  3.81 - 5.12 Million/uL Final    Hemoglobin 03/22/2025 12.1  11.5 - 15.4 g/dL Final    Hematocrit 03/22/2025 37.1  34.8 - 46.1 % Final    MCV 03/22/2025 85  82 - 98 fL Final    MCH 03/22/2025 27.6  26.8 - 34.3 pg Final    MCHC 03/22/2025 32.6  31.4 - 37.4 g/dL Final    RDW 03/22/2025 14.4  11.6 - 15.1 % Final    MPV 03/22/2025 11.7  8.9 - 12.7 fL Final    Platelets 03/22/2025 238  149 - 390 Thousands/uL Final    nRBC 03/22/2025 0  /100 WBCs Final    Segmented % 03/22/2025 71  43 - 75 % Final    Immature Grans % 03/22/2025 0  0 - 2 % Final    Lymphocytes % 03/22/2025 21  14 - 44 % Final    Monocytes % 03/22/2025 6  4 - 12 % Final    Eosinophils Relative 03/22/2025 2  0 - 6 % Final    Basophils Relative 03/22/2025 0  0 - 1 % Final    Absolute Neutrophils 03/22/2025 6.37  1.85 - 7.62 Thousands/µL Final    Absolute Immature Grans 03/22/2025 0.04  0.00 - 0.20 Thousand/uL Final    Absolute Lymphocytes 03/22/2025 1.90  0.60 - 4.47 Thousands/µL Final    Absolute Monocytes 03/22/2025 0.54  0.17 - 1.22 Thousand/µL Final    Eosinophils Absolute 03/22/2025 0.19  0.00 - 0.61 Thousand/µL Final    Basophils Absolute 03/22/2025 0.04  0.00 - 0.10 Thousands/µL Final    Sodium 03/22/2025 133 (L)  135 -  "147 mmol/L Final    Potassium 03/22/2025 3.7  3.5 - 5.3 mmol/L Final    Chloride 03/22/2025 104  96 - 108 mmol/L Final    CO2 03/22/2025 19 (L)  21 - 32 mmol/L Final    ANION GAP 03/22/2025 10  4 - 13 mmol/L Final    BUN 03/22/2025 26 (H)  5 - 25 mg/dL Final    Creatinine 03/22/2025 1.57 (H)  0.60 - 1.30 mg/dL Final    Standardized to IDMS reference method    Glucose 03/22/2025 333 (H)  65 - 140 mg/dL Final    If the patient is fasting, the ADA then defines impaired fasting glucose as > 100 mg/dL and diabetes as > or equal to 123 mg/dL.    Calcium 03/22/2025 9.7  8.4 - 10.2 mg/dL Final    AST 03/22/2025 24  13 - 39 U/L Final    ALT 03/22/2025 22  7 - 52 U/L Final    Specimen collection should occur prior to Sulfasalazine administration due to the potential for falsely depressed results.     Alkaline Phosphatase 03/22/2025 95  34 - 104 U/L Final    Total Protein 03/22/2025 7.7  6.4 - 8.4 g/dL Final    Albumin 03/22/2025 3.9  3.5 - 5.0 g/dL Final    Total Bilirubin 03/22/2025 0.69  0.20 - 1.00 mg/dL Final    Use of this assay is not recommended for patients undergoing treatment with eltrombopag due to the potential for falsely elevated results.  N-acetyl-p-benzoquinone imine (metabolite of Acetaminophen) will generate erroneously low results in samples for patients that have taken an overdose of Acetaminophen.    eGFR 03/22/2025 31  ml/min/1.73sq m Final    Lipase 03/22/2025 51  11 - 82 u/L Final    hs TnI 0hr 03/22/2025 6  \"Refer to ACS Flowchart\"- see link ng/L Final    Comment:                                              Initial (time 0) result  If >=50 ng/L, Myocardial injury suggested ;  Type of myocardial injury and treatment strategy  to be determined.  If 5-49 ng/L, a delta result at 2 hours and or 4 hours will be needed to further evaluate.  If <4 ng/L, and chest pain has been >3 hours since onset, patient may qualify for discharge based on the HEART score in the ED.  If <5 ng/L and <3hours since onset of " chest pain, a delta result at 2 hours will be needed to further evaluate.    HS Troponin 99th Percentile URL of a Health Population=12 ng/L with a 95% Confidence Interval of 8-18 ng/L.    Second Troponin (time 2 hours)  If calculated delta >= 20 ng/L,  Myocardial injury suggested ; Type of myocardial injury and treatment strategy to be determined.  If 5-49 ng/L and the calculated delta is 5-19 ng/L, consult medical service for evaluation.  Continue evaluation for ischemia on ecg and other possible etiology and repeat hs troponin at 4 hours.  If delta                            is <5 ng/L at 2 hours, consider discharge based on risk stratification via the HEART score (if in ED), or KATHIA risk score in IP/Observation.    HS Troponin 99th Percentile URL of a Health Population=12 ng/L with a 95% Confidence Interval of 8-18 ng/L.    Color, UA 03/22/2025 Light Yellow   Final    Clarity, UA 03/22/2025 Clear   Final    Specific Gravity, UA 03/22/2025 1.007  1.003 - 1.030 Final    pH, UA 03/22/2025 5.5  4.5, 5.0, 5.5, 6.0, 6.5, 7.0, 7.5, 8.0 Final    Leukocytes, UA 03/22/2025 Trace (A)  Negative Final    Nitrite, UA 03/22/2025 Negative  Negative Final    Protein, UA 03/22/2025 Negative  Negative mg/dl Final    Glucose, UA 03/22/2025 200 (1/5%) (A)  Negative mg/dl Final    Ketones, UA 03/22/2025 Negative  Negative mg/dl Final    Urobilinogen, UA 03/22/2025 <2.0  <2.0 mg/dl mg/dl Final    Bilirubin, UA 03/22/2025 Negative  Negative Final    Occult Blood, UA 03/22/2025 Negative  Negative Final    LACTIC ACID 03/22/2025 1.6  0.5 - 2.0 mmol/L Final    POC Glucose 03/22/2025 329 (H)  65 - 140 mg/dl Final    POC Glucose 03/22/2025 289 (H)  65 - 140 mg/dl Final    RBC, UA 03/22/2025 1-2  None Seen, 1-2 /hpf Final    WBC, UA 03/22/2025 10-20 (A)  None Seen, 1-2 /hpf Final    Epithelial Cells 03/22/2025 Occasional  None Seen, Occasional /hpf Final    Bacteria, UA 03/22/2025 Occasional  None Seen, Occasional /hpf Final    Hyaline Casts,  UA 03/22/2025 0-3 (A)  None Seen /lpf Final    Urine Culture 03/22/2025 <10,000 cfu/ml Staphylococcus coagulase negative (A)   Final    This organism has been edited. The previous result was Enterococcus faecalis on 3/24/2025 at 1820 EDT.    Urine Culture 03/22/2025 30,000-39,000 cfu/ml Enterococcus faecalis (A)   Final    POC Glucose 03/22/2025 263 (H)  65 - 140 mg/dl Final    Ventricular Rate 03/22/2025 66  BPM Final    Atrial Rate 03/22/2025 66  BPM Final    ME Interval 03/22/2025 188  ms Final    QRSD Interval 03/22/2025 88  ms Final    QT Interval 03/22/2025 408  ms Final    QTC Interval 03/22/2025 427  ms Final    P Axis 03/22/2025 67  degrees Final    QRS Axis 03/22/2025 -36  degrees Final    T Wave Axis 03/22/2025 -11  degrees Final   Admission on 03/05/2025, Discharged on 03/06/2025   Component Date Value Ref Range Status    POC Glucose 03/05/2025 80  65 - 140 mg/dl Final    POC Glucose 03/05/2025 103  65 - 140 mg/dl Final    POC Glucose 03/05/2025 148 (H)  65 - 140 mg/dl Final    POC Glucose 03/06/2025 102  65 - 140 mg/dl Final   Admission on 03/04/2025, Discharged on 03/04/2025   Component Date Value Ref Range Status    Magnesium 03/04/2025 1.7 (L)  1.9 - 2.7 mg/dL Final    Phosphorus 03/04/2025 2.1 (L)  2.3 - 4.1 mg/dL Final    WBC 03/04/2025 10.10  4.31 - 10.16 Thousand/uL Final    RBC 03/04/2025 4.33  3.81 - 5.12 Million/uL Final    Hemoglobin 03/04/2025 11.9  11.5 - 15.4 g/dL Final    Hematocrit 03/04/2025 37.6  34.8 - 46.1 % Final    MCV 03/04/2025 87  82 - 98 fL Final    MCH 03/04/2025 27.5  26.8 - 34.3 pg Final    MCHC 03/04/2025 31.6  31.4 - 37.4 g/dL Final    RDW 03/04/2025 14.7  11.6 - 15.1 % Final    MPV 03/04/2025 10.7  8.9 - 12.7 fL Final    Platelets 03/04/2025 323  149 - 390 Thousands/uL Final    Sodium 03/04/2025 138  135 - 147 mmol/L Final    Potassium 03/04/2025 4.5  3.5 - 5.3 mmol/L Final    Chloride 03/04/2025 104  96 - 108 mmol/L Final    CO2 03/04/2025 23  21 - 32 mmol/L Final     "ANION GAP 03/04/2025 11  4 - 13 mmol/L Final    BUN 03/04/2025 17  5 - 25 mg/dL Final    Creatinine 03/04/2025 1.36 (H)  0.60 - 1.30 mg/dL Final    Standardized to IDMS reference method    Glucose 03/04/2025 193 (H)  65 - 140 mg/dL Final    If the patient is fasting, the ADA then defines impaired fasting glucose as > 100 mg/dL and diabetes as > or equal to 123 mg/dL.    Calcium 03/04/2025 9.7  8.4 - 10.2 mg/dL Final    AST 03/04/2025 20  13 - 39 U/L Final    ALT 03/04/2025 15  7 - 52 U/L Final    Specimen collection should occur prior to Sulfasalazine administration due to the potential for falsely depressed results.     Alkaline Phosphatase 03/04/2025 106 (H)  34 - 104 U/L Final    Total Protein 03/04/2025 8.0  6.4 - 8.4 g/dL Final    Albumin 03/04/2025 3.8  3.5 - 5.0 g/dL Final    Total Bilirubin 03/04/2025 0.61  0.20 - 1.00 mg/dL Final    Use of this assay is not recommended for patients undergoing treatment with eltrombopag due to the potential for falsely elevated results.  N-acetyl-p-benzoquinone imine (metabolite of Acetaminophen) will generate erroneously low results in samples for patients that have taken an overdose of Acetaminophen.    eGFR 03/04/2025 37  ml/min/1.73sq m Final    Lipase 03/04/2025 19  11 - 82 u/L Final    LACTIC ACID 03/04/2025 1.7  0.5 - 2.0 mmol/L Final    pH, Dae 03/04/2025 7.573 (H)  7.300 - 7.400 Final    pCO2, Dae 03/04/2025 24.1 (L)  42.0 - 50.0 mm Hg Final    pO2, Dae 03/04/2025 46.2 (H)  35.0 - 45.0 mm Hg Final    HCO3, Dae 03/04/2025 21.7 (L)  24 - 30 mmol/L Final    Base Excess, Dae 03/04/2025 1.3  mmol/L Final    O2 Content, Dae 03/04/2025 16.1  ml/dL Final    O2 HGB, VENOUS 03/04/2025 85.5 (H)  60.0 - 80.0 % Final    Beta- Hydroxybutyrate 03/04/2025 0.06  0.02 - 0.27 mmol/L Final    POC Glucose 03/04/2025 202 (H)  65 - 140 mg/dl Final    hs TnI 0hr 03/04/2025 5  \"Refer to ACS Flowchart\"- see link ng/L Final    Comment:                                              Initial " (time 0) result  If >=50 ng/L, Myocardial injury suggested ;  Type of myocardial injury and treatment strategy  to be determined.  If 5-49 ng/L, a delta result at 2 hours and or 4 hours will be needed to further evaluate.  If <4 ng/L, and chest pain has been >3 hours since onset, patient may qualify for discharge based on the HEART score in the ED.  If <5 ng/L and <3hours since onset of chest pain, a delta result at 2 hours will be needed to further evaluate.    HS Troponin 99th Percentile URL of a Health Population=12 ng/L with a 95% Confidence Interval of 8-18 ng/L.    Second Troponin (time 2 hours)  If calculated delta >= 20 ng/L,  Myocardial injury suggested ; Type of myocardial injury and treatment strategy to be determined.  If 5-49 ng/L and the calculated delta is 5-19 ng/L, consult medical service for evaluation.  Continue evaluation for ischemia on ecg and other possible etiology and repeat hs troponin at 4 hours.  If delta                            is <5 ng/L at 2 hours, consider discharge based on risk stratification via the HEART score (if in ED), or KATHIA risk score in IP/Observation.    HS Troponin 99th Percentile URL of a Health Population=12 ng/L with a 95% Confidence Interval of 8-18 ng/L.    POC Glucose 03/04/2025 181 (H)  65 - 140 mg/dl Final    Segmented % 03/04/2025 55  43 - 75 % Final    Lymphocytes % 03/04/2025 30  14 - 44 % Final    Monocytes % 03/04/2025 11  4 - 12 % Final    Eosinophils % 03/04/2025 0  0 - 6 % Final    Basophils % 03/04/2025 2 (H)  0 - 1 % Final    Atypical Lymphocytes % 03/04/2025 2 (H)  <=0 % Final    Absolute Neutrophils 03/04/2025 5.56  1.85 - 7.62 Thousand/uL Final    Absolute Lymphocytes 03/04/2025 3.23  0.60 - 4.47 Thousand/uL Final    Absolute Monocytes 03/04/2025 1.11  0.00 - 1.22 Thousand/uL Final    Absolute Eosinophils 03/04/2025 0.00  0.00 - 0.40 Thousand/uL Final    Absolute Basophils 03/04/2025 0.20 (H)  0.00 - 0.10 Thousand/uL Final    RBC Morphology  03/04/2025 Normal   Final    Platelet Estimate 03/04/2025 Adequate  Adequate Final    POC Glucose 03/04/2025 141 (H)  65 - 140 mg/dl Final   Refill on 02/24/2025   Component Date Value Ref Range Status    Ventricular Rate 03/04/2025 77  BPM Final    Atrial Rate 03/04/2025 77  BPM Final    NJ Interval 03/04/2025 158  ms Final    QRSD Interval 03/04/2025 82  ms Final    QT Interval 03/04/2025 402  ms Final    QTC Interval 03/04/2025 454  ms Final    P Axis 03/04/2025 63  degrees Final    QRS [place] 03/04/2025 -20  degrees Final    T Wave Axis 03/04/2025 -13  degrees Final   Office Visit on 02/17/2025   Component Date Value Ref Range Status    Supplier Name 02/17/2025 [employer]   In process    Supplier Phone Number 02/17/2025 [phone]   In process    Order Status 02/17/2025 Processing   In process    Delivery Request Date 02/17/2025 02/17/2025   In process    Item Description 02/17/2025 Dexcom G7    In process    Comment: Qty: 1  Allow Transmitter, , and/or Sensor Model Substitution: Yes  CGM Status: They have used a CGM device in the past but are not currently   using  Directions for CGM use: Use per  directions      Item Description 02/17/2025 Dexcom G7 Sensor (All-in-One), Change Every 10 Days, Pack (1)   In process    Comment: Qty: 9  Day Supply: 90  Allow Transmitter, , and/or Sensor Model Substitution: Yes  CGM Status: They have used a CGM device in the past but are not currently   using  Directions for CGM use: Use per  directions     No results displayed because visit has over 200 results.      Office Visit on 02/10/2025   Component Date Value Ref Range Status    Hemoglobin A1C 02/10/2025 12.4 (A)  <=6.5 Final   There may be more visits with results that are not included.       Suicide/Homicide Risk Assessment:  The following interventions are recommended: no intervention changes needed. Although patient's acute lethality risk is low, long-term/chronic  lethality risk is mildly elevated in the presence of the above. At the current moment, pt is future-oriented, forward-thinking, and demonstrates ability to act in a self-preserving manner as evidenced by volitionally presenting to the clinic today, seeking treatment. Additionally, pt sits throughout the assessment wearing personal protective gear (i.e. medical mask) in the context of the ongoing COVID-19 pandemic, suggesting a will and desire to live. At this juncture, inpatient hospitalization is not currently warranted. To mitigate future risk, patient should adhere to the recommendations of this writer, avoid alcohol/illicit substance use, utilize community-based resources and familiar support and prioritize mental health treatment.      Based on today's assessment and clinical criteria, pt contracts for safety and is not an imminent risk of harm to self or others. Outpatient level of care is deemed appropriate at this present time. Pt understands that if they are no longer able to contract for safety, they need to call/contact the outpatient office including this writer and call/contact crisis and/or attend to the nearest Emergency Department for immediate evaluation.     Assessment/Plan:   On assessment, Yajaira Marsh reports symptoms consistent with MDD characterized by anhedonia, low motivation, poor concentration, poor appetite associated with weight loss and physical weakness and low energy (40 pounds of weight loss since February with ongoing difficulty eating due to food aversion), and insomnia with nighttime anxiety. Mood has been worsening since discharge on 3/28. Pt has not been consistently adherent to medication. Negative biases towards psychiatric care and resistance with the aging process in the context of multiple recent hospitalizations since 11/2024.  We discussed both medication management and behavioral interventions.  Emphasized the importance of taking psychiatric medications  consistently every day for effectiveness.  Discussed various ways to improve engagement with life including exercise, diet, socialization, and hobbies.  Patient is in agreement with the treatment plan as detailed below, and agrees to call the office with any concerns or side effects between appointments.     Presently, patient adamantly denies suicidal ideation, intent or plan at present in addition to thoughts of self-injury, citing fear of death as deterrents against self-harm; contracts for safety, see risk assessment for further detail. She denies homicidal ideation, intent or plan at present. At conclusion of evaluation, patient is amenable to the recommendations of this writer including: medications as prescribed, attending routine appointments.  Also, patient is amenable to calling/contacting the outpatient office including this writer if any acute adverse effects of their medication regimen arise in addition to any comments or concerns pertaining to their psychiatric management.  Patient is amenable to calling/contacting crisis and/or attending to the nearest emergency department if their clinical condition deteriorates to assure their safety and stability, stating that they are able to appropriately confide in their doctors regarding their psychiatric state.    DSM-5 Diagnoses:   Assessment & Plan  Major depressive disorder, recurrent, moderate (HCC)  Start mirtazapine 7.5mg QHS for 2 weeks, then increase to 15mg QHS for mood, appetite, and sleep  Stop venlafaxine ER as pt is not very compliant and there is high risk for withdrawal effect    Treatment Recommendations/Precautions:  Labs most recently obtained, reviewed.   Follow up in 1-3 months for medication management  Follow up with PCP for medical issues and ongoing care  Aware of 24 hour and weekend coverage for urgent situations accessed by calling [facility] main practice number    Controlled Medication Discussion:     Yajaira  has been filling controlled prescriptions on time as prescribed according to [place] Prescription Drug Monitoring Program    Treatment Plan:    Completed and signed during the session: Yes - with Yajaira      Visit Time    Visit Start Time: 1015  Visit Stop Time: 1215  Total Visit Duration: 120 minutes    Sammi Hernandez MD   04/28/25

## 2025-04-29 ENCOUNTER — OFFICE VISIT (OUTPATIENT)
Dept: BEHAVIORAL/MENTAL HEALTH CLINIC | Facility: CLINIC | Age: 80
End: 2025-04-29
Payer: COMMERCIAL

## 2025-04-29 DIAGNOSIS — F33.2 MAJOR DEPRESSIVE DISORDER, RECURRENT SEVERE WITHOUT PSYCHOTIC FEATURES (HCC): ICD-10-CM

## 2025-04-29 DIAGNOSIS — F41.9 ANXIETY: Primary | ICD-10-CM

## 2025-04-29 DIAGNOSIS — F33.1 MAJOR DEPRESSIVE DISORDER, RECURRENT, MODERATE (HCC): ICD-10-CM

## 2025-04-29 PROCEDURE — 90791 PSYCH DIAGNOSTIC EVALUATION: CPT | Performed by: SOCIAL WORKER

## 2025-04-29 NOTE — PSYCH
Behavioral Health Psychotherapy Assessment    Date of Initial Psychotherapy Assessment: 04/29/25  Referral Source: Self, [facility]  Has a release of information been signed for the referral source? Yes    Preferred Name: Yajaira Marsh  Preferred Pronouns: She/her  YOB: 1945 Age: 79 y.o.  Sex assigned at birth: female   Gender Identity: Female  Race:   Preferred Language: English    Emergency Contact:  Full Name: Kathryn Klein  Daughter  Emergency Contact  [phone]  First Alternate Health Care Agent  Nikko Marsh  Spouse  [phone]  [facility] [address]  Notify on admission  Relationship to Client: dtr,   Contact information: see above    Primary Care Physician:  Sukumar Clemens DO  [address] [facility] [address] [phone]  Has a release of information been signed? Yes    Physical Health History:  Past surgical procedures:  cardiac bypass.   Do you have a history of any of the following: heart/cardiac issues, diabetes, and thyroid disease  Do you have any mobility issues? Yes, describe: able to get around , adl  Developmental History: milestones met    Relevant Family History:  Two children.     Presenting Problem (What brings you in?)  Bad nerves, having trouble breathing, allergies    Mental Health Advance Directive:  Do you currently have a Mental Health Advance Directive?yes    Diagnosis:  No diagnosis found.    Initial Assessment:     Social Determinants of Health:    SDOH:  None        Visit start and stop times:  1:00-1:45  04/29/25

## 2025-04-30 ENCOUNTER — TELEPHONE (OUTPATIENT)
Age: 80
End: 2025-04-30

## 2025-04-30 DIAGNOSIS — F33.1 MAJOR DEPRESSIVE DISORDER, RECURRENT, MODERATE (HCC): ICD-10-CM

## 2025-04-30 DIAGNOSIS — F32.A DEPRESSIVE DISORDER: ICD-10-CM

## 2025-04-30 RX ORDER — MIRTAZAPINE 15 MG/1
15 TABLET, FILM COATED ORAL
Qty: 30 TABLET | Refills: 0 | Status: SHIPPED | OUTPATIENT
Start: 2025-05-14

## 2025-04-30 RX ORDER — MIRTAZAPINE 7.5 MG/1
TABLET, FILM COATED ORAL
Qty: 46 TABLET | Refills: 0 | OUTPATIENT
Start: 2025-04-30

## 2025-04-30 RX ORDER — MIRTAZAPINE 7.5 MG/1
7.5 TABLET, FILM COATED ORAL
Qty: 14 TABLET | Refills: 0 | Status: SHIPPED | OUTPATIENT
Start: 2025-04-30 | End: 2025-05-14

## 2025-04-30 NOTE — PSYCH
Patient was on time for session intake.  She is a 78 yo,   female with children and grandchildren.  She has been in tx before and found it helpful.  She stated that they have moved 9 x over the past several years due to not being happy.  She lives in a [place] community and has no friends.  She does visit grandchildren, but is still looking to move again.  is not company and   tends to ignore her.       Patient struggles with high anxiety and depression due to feeling lonely and isolated.  She has felt this way for years and seems to move to a location were she has friends.  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 She feels depressed most of the day especially when she has nothing to look forward .  She has diminished interest in things and tends to have disrupted sleep. She also stated poor appetite and has lost 40lb from her hospital stay at  6th floor  due to anxiety.   She does have recurrent thought of her death and fears dying. She is not SI/HI, no D/A. Struggles with MDD. Patients dtr moved in with them due to her struggles, but is no help with patient.     Patient has been set up with several individual  therapy appt with this writer.  Writer will activate CBT, MI and develop treatment plan that reflects short and long term goals to combat her depression and anxiety.  She is on Remeron 7.5 mg for depression and cooperates with prescribed meds.

## 2025-04-30 NOTE — TELEPHONE ENCOUNTER
Yajaira called the office upset due to not being able to get her script for Mirtazapine filled due to insurance rejecting it.  Unable to get pharmacist on the line but more than likely due to quantity limit.  Will refer to to see if script can be re-submitted.    Will refer to Dr Hernandez for review.

## 2025-04-30 NOTE — TELEPHONE ENCOUNTER
PA for mirtazapine (REMERON) 7.5 MG tablet SUBMITTED to [facility]     via    []CMM-KEY:   [x]Surescripts  []Availity-Auth ID # NDC #   []Faxed to plan   []Other website   []Phone call Case ID #     []PA sent as URGENT    All office notes, labs and other pertaining documents and studies sent. Clinical questions answered. Awaiting determination from insurance company.     Turnaround time for your insurance to make a decision on your Prior Authorization can take 7-21 business days.

## 2025-04-30 NOTE — TELEPHONE ENCOUNTER
Patient called and reported that provider did not write a medication script out how she usually does and therefore, insurance will not cover it. Writer transferred her to nurse for assistance.

## 2025-04-30 NOTE — TELEPHONE ENCOUNTER
Reason for call:   [] Refill   [x] Prior Auth  [] Other:     Office:   [] PCP/Provider -   [x] Specialty/Provider -

## 2025-05-01 ENCOUNTER — OFFICE VISIT (OUTPATIENT)
Dept: INTERNAL MEDICINE CLINIC | Facility: CLINIC | Age: 80
End: 2025-05-01
Payer: COMMERCIAL

## 2025-05-01 VITALS
OXYGEN SATURATION: 98 % | BODY MASS INDEX: 29.59 KG/M2 | DIASTOLIC BLOOD PRESSURE: 80 MMHG | SYSTOLIC BLOOD PRESSURE: 132 MMHG | HEART RATE: 104 BPM | WEIGHT: 156.6 LBS

## 2025-05-01 DIAGNOSIS — J34.89 RHINORRHEA: Primary | ICD-10-CM

## 2025-05-01 PROCEDURE — 87804 INFLUENZA ASSAY W/OPTIC: CPT

## 2025-05-01 PROCEDURE — G2211 COMPLEX E/M VISIT ADD ON: HCPCS

## 2025-05-01 PROCEDURE — 99213 OFFICE O/P EST LOW 20 MIN: CPT

## 2025-05-01 PROCEDURE — 87811 SARS-COV-2 COVID19 W/OPTIC: CPT

## 2025-05-01 RX ORDER — DESLORATADINE 5 MG/1
5 TABLET ORAL DAILY
Qty: 14 TABLET | Refills: 0 | Status: SHIPPED | OUTPATIENT
Start: 2025-05-01 | End: 2025-05-15

## 2025-05-01 NOTE — PROGRESS NOTES
Name: Yajaira Marsh      : 1945      MRN: [id number]  Encounter Provider: Essie Woo MD  Encounter Date: 2025   Encounter [facility]: [facility]  :  Assessment & Plan  Rhinorrhea  Patient has been experiencing runny nose for the past 1 week  Patient was exposed to COVID-positive family member  Flu COVID-negative in the clinic    PLAN:  Clarinex 5 Mg tablet daily for 2 weeks  Orders:    POCT Rapid Covid Ag    POCT rapid flu A and B    desloratadine (CLARINEX) 5 MG tablet; Take 1 tablet (5 mg total) by mouth daily for 14 days           History of Present Illness   HPI 79-year-old female with past medical history of anxiety comes into the clinic secondary to rhinorrhea for the past 2 weeks.  Patient had positive COVID contact last week.  Patient also complains of shortness of breath.  Patient denies any fevers or chills.  Patient denies any sore throat or ear pain.  Patient denies any eye discharge or redness.  Review of Systems   Constitutional: Negative.    HENT:  Positive for postnasal drip and rhinorrhea.    Eyes: Negative.    Respiratory: Negative.     Cardiovascular: Negative.    Gastrointestinal: Negative.    Genitourinary: Negative.    Musculoskeletal: Negative.    Neurological: Negative.    Hematological: Negative.        Objective   /80 (BP Location: Left arm, Patient Position: Sitting, Cuff Size: Standard)   Pulse 104   Wt 71 kg (156 lb 9.6 oz)   SpO2 98%   BMI 29.59 kg/m²      Physical Exam  Constitutional:       General: She is in acute distress.   HENT:      Head: Normocephalic.      Right Ear: External ear normal.      Nose: Rhinorrhea present.      Mouth/Throat:      Pharynx: Oropharynx is clear.   Eyes:      Extraocular Movements: Extraocular movements intact.      Conjunctiva/sclera: Conjunctivae normal.      Pupils: Pupils are equal, round, and reactive to light.   Cardiovascular:      Rate and Rhythm: Normal rate and regular rhythm.       Pulses: Normal pulses.      Heart sounds: Normal heart sounds.   Pulmonary:      Effort: Pulmonary effort is normal.      Breath sounds: Normal breath sounds.   Abdominal:      General: Bowel sounds are normal.      Palpations: Abdomen is soft.   Musculoskeletal:         General: Normal range of motion.   Skin:     General: Skin is warm.   Neurological:      General: No focal deficit present.      Mental Status: She is alert and oriented to person, place, and time.

## 2025-05-01 NOTE — ASSESSMENT & PLAN NOTE
Patient has been experiencing runny nose for the past 1 week  Patient was exposed to COVID-positive family member  Flu COVID-negative in the clinic    PLAN:  Clarinex 5 Mg tablet daily for 2 weeks  Orders:    POCT Rapid Covid Ag    POCT rapid flu A and B    desloratadine (CLARINEX) 5 MG tablet; Take 1 tablet (5 mg total) by mouth daily for 14 days

## 2025-05-01 NOTE — TELEPHONE ENCOUNTER
Pt calling.  The claritin did not help her.  She is worried she has covid. She was exposed earlier this week.  No SOB, only nasal congestion and fatigue. Appt made for this afternoon to be evaluated.

## 2025-05-01 NOTE — TELEPHONE ENCOUNTER
PA for mirtazapine (REMERON) 7.5 MG tablet  APPROVED     Date(s) approved 1/1/2025 to 5/1/2026     Case #    Patient advised by          [][place] [place] Message  []Phone call   [x]LMOM  []L/M to call office as no active Communication consent on file  []Unable to leave detailed message as VM not approved on Communication consent       Pharmacy advised by    [x]Fax  []Phone call  []Secure Chat    Specialty Pharmacy    []     Approval letter scanned into Media

## 2025-05-02 ENCOUNTER — TELEPHONE (OUTPATIENT)
Age: 80
End: 2025-05-02

## 2025-05-02 ENCOUNTER — OFFICE VISIT (OUTPATIENT)
Dept: ENDOCRINOLOGY | Facility: CLINIC | Age: 80
End: 2025-05-02
Payer: COMMERCIAL

## 2025-05-02 VITALS
HEIGHT: 61 IN | SYSTOLIC BLOOD PRESSURE: 136 MMHG | BODY MASS INDEX: 29.45 KG/M2 | WEIGHT: 156 LBS | TEMPERATURE: 98.2 F | OXYGEN SATURATION: 98 % | HEART RATE: 107 BPM | DIASTOLIC BLOOD PRESSURE: 74 MMHG

## 2025-05-02 DIAGNOSIS — E78.2 MIXED HYPERLIPIDEMIA: Chronic | ICD-10-CM

## 2025-05-02 DIAGNOSIS — E55.9 VITAMIN D DEFICIENCY: ICD-10-CM

## 2025-05-02 DIAGNOSIS — E06.3 HYPOTHYROIDISM DUE TO HASHIMOTO THYROIDITIS: ICD-10-CM

## 2025-05-02 DIAGNOSIS — Z13.9 ENCOUNTER FOR SCREENING INVOLVING SOCIAL DETERMINANTS OF HEALTH (SDOH): ICD-10-CM

## 2025-05-02 DIAGNOSIS — Z79.4 TYPE 2 DIABETES MELLITUS WITH HYPERGLYCEMIA, WITH LONG-TERM CURRENT USE OF INSULIN (HCC): Primary | Chronic | ICD-10-CM

## 2025-05-02 DIAGNOSIS — E11.65 TYPE 2 DIABETES MELLITUS WITH HYPERGLYCEMIA, WITH LONG-TERM CURRENT USE OF INSULIN (HCC): Primary | Chronic | ICD-10-CM

## 2025-05-02 DIAGNOSIS — E11.42 DIABETIC POLYNEUROPATHY ASSOCIATED WITH TYPE 2 DIABETES MELLITUS (HCC): Chronic | ICD-10-CM

## 2025-05-02 PROCEDURE — G2211 COMPLEX E/M VISIT ADD ON: HCPCS | Performed by: INTERNAL MEDICINE

## 2025-05-02 PROCEDURE — 99215 OFFICE O/P EST HI 40 MIN: CPT | Performed by: INTERNAL MEDICINE

## 2025-05-02 RX ORDER — INSULIN ASPART 100 [IU]/ML
INJECTION, SOLUTION INTRAVENOUS; SUBCUTANEOUS
COMMUNITY
Start: 2025-04-30

## 2025-05-02 NOTE — ASSESSMENT & PLAN NOTE
She seems very uncontrolled with reported hyperglycemia and hypoglycemia. She was recently hospitalized and since then she is not coping.  is 84 yr old and frail also. She has poor social determinants of health and at risk for brittle diabetes. Diabetes is complicated with CKD3 eGFR 37.    She has not been using the insulin regimen Rxed last visit. Since she lost weight, we will reduce insulin regimen as listed below.    Advised to maintain goal blood sugars 70-180mg/dL.      Novolog Insulin( skip this dose if you skip a meal)   10u   10u   10u    Regular, Apidra, Humalog orNovolog Sliding Scale:   <80              151-200 + 2 +2 +2    201-250 +4 +4 +4 +   251-300 +6 +6 +6 +   301-350 +8 +8 +8 +   >350 +10 +10 +10 +     Lantus/Toujeo Insulin 50u        Not suing cgm - will submit via [employer] for donny 3 plus to reduce burden of use as dexcom is more complicated interface.    Follow up in 2 months.      Lab Results   Component Value Date    HGBA1C 11.1 (H) 03/07/2025

## 2025-05-02 NOTE — TELEPHONE ENCOUNTER
PA desloratadine (CLARINEX) 5 MG SUBMITTED    to [facility]     via    []CMM-KEY:    [x]Surescripts   []Availity-Auth ID #  NDC #    []Faxed to plan    []Other website    []Phone call Case ID #       []PA sent as URGENT    All office notes, labs and other pertaining documents and studies sent. Clinical questions answered. Awaiting determination from insurance company.     Turnaround time for your insurance to make a decision on your Prior Authorization can take 7-21 business days.

## 2025-05-02 NOTE — PATIENT INSTRUCTIONS
Novolog Insulin( skip this dose if you skip a meal)    10u    10u    10u     Regular, Apidra, Humalog orNovolog Sliding Scale:   <80                      151-200 + 2 +2 +2     201-250 +4 +4 +4 +   251-300 +6 +6 +6 +   301-350 +8 +8 +8 +   >350 +10 +10 +10 +      Lantus/Toujeo Insulin 50u           Goal glucose is 80-200mg/dL.

## 2025-05-02 NOTE — PROGRESS NOTES
"    Follow-up Patient Progress Note      CC: Type 2 diabetes, weight loss     History of Present Illness:   79yr female with Type 2 Diabetes since 1994, HTN, HLD, CKD3 eGFR 37, Hypothyroidism, CAD, DJD and vit D deficiency. Last visit was 3/18/25.     Since last visit, she lost 15 lbs.     CGM data review::  Device: Dexcom          Dates:  3/6/25-3/18/25             Usage: 93 %   Av glu: 228 mg/dL                   SD: 66 mg/dL        CV: 29.1 %      GMI: 8.8 %  TIR: 19 %        TAR: 45+35 %             TBR: 1  %     Glycemic patters:  some fasting and significant pp hyperglycemia.  Hypoglycemia: No     Current meds:  Toujeo 50u  Novolog 20u TIDAC     Opthamology: yes  Podiatry:   vaccination:   Dental:  Pancreatitis:      Ace/ARB: losartan  Statin:rosuvastatin  Thyroid issues: levothyroxine 75mcg qdaily.     FHx: father had diabetes and CKD requiring PD.      Physical Exam:  Body mass index is 29.48 kg/m².  /74 (BP Location: Left arm, Patient Position: Sitting, Cuff Size: Standard)   Pulse (!) 107   Temp 98.2 °F (36.8 °C) (Tympanic)   Ht 5' 1\" (1.549 m)   Wt 70.8 kg (156 lb)   SpO2 98%   BMI 29.48 kg/m²    Vitals:    05/02/25 1114   Weight: 70.8 kg (156 lb)        Physical Exam  Constitutional:       General: She is not in acute distress.     Appearance: She is well-developed.   HENT:      Head: Normocephalic and atraumatic.      Nose: Nose normal.   Eyes:      Conjunctiva/sclera: Conjunctivae normal.   Pulmonary:      Effort: Pulmonary effort is normal.   Abdominal:      General: There is no distension.   Musculoskeletal:      Cervical back: Normal range of motion and neck supple.   Skin:     Findings: No rash.      Comments: No icterus   Neurological:      Mental Status: She is alert and oriented to person, place, and time.       Labs:   Lab Results   Component Value Date    HGBA1C 11.1 (H) 03/07/2025       Lab Results   Component Value Date    XLV3HHAIKXDA 5.080 (H) 03/23/2025    TSH 2.95 02/03/2023 "       Lab Results   Component Value Date    CREATININE 1.44 (H) 04/23/2025    CREATININE 1.43 (H) 03/27/2025    CREATININE 1.78 (H) 03/26/2025    BUN 16 04/23/2025     11/16/2017    K 4.1 04/23/2025     04/23/2025    CO2 17 (L) 04/23/2025     eGFRcr   Date Value Ref Range Status   03/07/2025 37 (L) >59 Final     eGFR   Date Value Ref Range Status   04/23/2025 34 ml/min/1.73sq m Final       Lab Results   Component Value Date    ALT 15 04/23/2025    AST 20 04/23/2025    ALKPHOS 88 04/23/2025    BILITOT 0.5 11/16/2017       Lab Results   Component Value Date    CHOLESTEROL 107 03/25/2025    CHOLESTEROL 135 12/13/2021     Lab Results   Component Value Date    HDL 32 (L) 03/25/2025    HDL 52 12/13/2021    HDL 44 (L) 11/16/2017     Lab Results   Component Value Date    TRIG 143 03/25/2025    TRIG 111 12/13/2021    TRIG 147 11/16/2017     Lab Results   Component Value Date    NONHDLC 75 03/25/2025    NONHDLC 117 11/16/2017    NONHDLC 131 02/19/2016         Assessment/Plan:    1. Type 2 diabetes mellitus with hyperglycemia, with long-term current use of insulin (HCC)  Assessment & Plan:  She seems very uncontrolled with reported hyperglycemia and hypoglycemia. She was recently hospitalized and since then she is not coping.  is 84 yr old and frail also. She has poor social determinants of health and at risk for brittle diabetes. Diabetes is complicated with CKD3 eGFR 37.    She has not been using the insulin regimen Rxed last visit. Since she lost weight, we will reduce insulin regimen as listed below.    Advised to maintain goal blood sugars 70-180mg/dL.      Novolog Insulin( skip this dose if you skip a meal)   10u   10u   10u    Regular, Apidra, Humalog orNovolog Sliding Scale:   <80              151-200 + 2 +2 +2    201-250 +4 +4 +4 +   251-300 +6 +6 +6 +   301-350 +8 +8 +8 +   >350 +10 +10 +10 +     Lantus/Toujeo Insulin 50u        Not suing cgm - will submit via DME for donny 3 plus to reduce  burden of use as dexcom is more complicated interface.    Follow up in 2 months.      Lab Results   Component Value Date    HGBA1C 11.1 (H) 03/07/2025     2. Hypothyroidism due to Hashimoto thyroiditis  Assessment & Plan:  She seems clinically stable.  We agreed to continue levothyroxine 75mcg qdaily and repeat labs prior to next visit.  3. Diabetic polyneuropathy associated with type 2 diabetes mellitus (HCC)  4. Mixed hyperlipidemia  5. Vitamin D deficiency  6. Encounter for screening involving social determinants of health (SDoH)  -     Ambulatory referral to complex care management program; Future        I have spent a total time of 40 minutes on 05/02/25 in caring for this patient including greater than 50% of this time was spent in counseling/coordination of care as listed above.       Discussed with the patient and all questioned fully answered. She will contact me with concerns.    Ranjith Pope

## 2025-05-02 NOTE — ASSESSMENT & PLAN NOTE
She seems clinically stable.  We agreed to continue levothyroxine 75mcg qdaily and repeat labs prior to next visit.   Telephone Encounter by Katelyn Schreiber RMA at 05/14/18 10:32 AM     Author:  Katelyn Schreiber RMA Service:  (none) Author Type:  Medical Assistant     Filed:  05/14/18 10:32 AM Encounter Date:  5/14/2018 Status:  Signed     :  Katelyn Schreiber RMA (Medical Assistant)            Appt cancelled.[LR1.1M]  Electronically Signed by:    PRINCE Fitzgerald , 5/14/2018[LR1.1T]        Revision History        User Key Date/Time User Provider Type Action    > LR1.1 05/14/18 10:32 AM Katelyn Schreiber RMA Medical Assistant Sign    M - Manual, T - Template

## 2025-05-05 ENCOUNTER — PATIENT OUTREACH (OUTPATIENT)
Dept: CASE MANAGEMENT | Facility: HOSPITAL | Age: 80
End: 2025-05-05

## 2025-05-05 NOTE — PROGRESS NOTES
Care Management referral received for A1C 11.1 on 3/2025.  Chart review completed.    PMH: Major Dep, DVT, CAD, Spinal Sten, MADAN DM2-Neuropathy, HTN, s/p CABG    Diabetes Education Johnny scheduled for 5/28 new pt apt.      Attempted outreach to pt, phone rang multiple times then computer voice stated memory full, enter access code. Unable to leave .     RNCM will follow up.

## 2025-05-05 NOTE — TELEPHONE ENCOUNTER
PA desloratadine (CLARINEX) 5 MG DENIED    Reason:(Screenshot if applicable)        Message sent to office clinical pool Yes    Denial letter scanned into Media Yes    We can gladly do an appeal but the process can take about 30-60 days to provide determination. Please Schedule a Peer to Peer at phone [phone]. If an appeal is truly warranted please have Provider send clinical documentation to the PA department to support the appeal.     **Please follow up with your patient regarding denial and next steps**

## 2025-05-08 ENCOUNTER — PATIENT OUTREACH (OUTPATIENT)
Dept: CASE MANAGEMENT | Facility: OTHER | Age: 80
End: 2025-05-08

## 2025-05-08 ENCOUNTER — TELEPHONE (OUTPATIENT)
Age: 80
End: 2025-05-08

## 2025-05-08 DIAGNOSIS — Z78.9 NEED FOR FOLLOW-UP BY SOCIAL WORKER: Primary | ICD-10-CM

## 2025-05-08 NOTE — TELEPHONE ENCOUNTER
Patient called regarding Jardiance 25 mg tablet  (no active prescription and from what RN can tell patient has never been on?). Patient follows with endocrinology. Reports she has been receiving shipments of it for a while. [employer] has been the ones shipping them. Has several boxes. RN advised she should absolutely not take them nor give out personal information if they ask. Wanted provider to be aware.

## 2025-05-08 NOTE — PROGRESS NOTES
Outpatient Care Management note- CM referral, Direct provider referral from Endo    Chart review completed    Referral states patient has poor social determinants of health and at risk for brittle DM.     Call initiated to Yajaira- call was picked up and hung up several times by the patient and spouse. Patient finally answered and this RN CM introduced self and explained the RN CM role. Made patient aware that referral was placed by her Endo to assist with DM management. The patient apologized for hanging up stating she receives multiple calls all day long and most are telemarketers or robo-calls.     Discussed patient's DM management. She reports she is taking her Novolog insulin with meals. She is very aware not to take insulin if she doesn't eat. Confirmed she is using a sliding scale chart provided by Endo. Patient states she has not done any insulin today because she had to deal with taking her dog to the vet this morning but her last BG from yesterday was 232. Using the chart as reference, she was able to states she took the standard 10 units plus an additional 4 units from the scale. Discussed hypoglycemia and she has had episodes in the past. She states she will not take her Toujeo at night if her last injection of Novolog is too close to bedtime because she doesn't want to have hypoglycemia over night from too much insulin too close together.     Yajaira reports her main issues is her ability to eat. She reports she has been dealing with soreness of her tongue that causing difficulty with taste and texture. She reports she has made her PCP aware of this and she hospitalized in March. She states her PCP thought it was dehydration. She reports recently losing >40 lbs. Discussed diet and Yajaira states she is at the point where she doesn't worry about what she eats, she just needs to eat something. She reports eating a lot of fast food. She states she is scheduled with GI on 7/8/25 for an EGD and nutrition  services on 7/9/25 to discuss her eating.    Her spouse drives. She states her daughter is living with them but doesn't assist with things she thought she would. She reports having financial difficulty- ambulance and hospital bills. The patient was unclear if access to food was difficult.     Offered the patient an OP SW CM referral to discuss financial difficulty and review all SDOH. Patient agreeable.     Yajaira is scheduled with the DM education center on 5/28/25. Encouraged to keep the appt.     Encouraged patient to call her PCP to address the tongue soreness.     Will route to endo giving this update.

## 2025-05-09 NOTE — TELEPHONE ENCOUNTER
I helped patient complete PAP for Jardiance back in Feb 2025. Nothing more was done and not received by our office.     [employer] is an online platform independent from [facility] that helps patients apply for patient assitstance programs and then mails the drug to them.     She ilkely qualified for the BI PAP for Jardiance and is getting it from this third party (Natanael Ulien) who is acting like the doctor's office.     She did not keep her follow up appt with me to recheck A1C, egfr after jardiance initiation so this is news to our office.     She should keep taking the Jardiance.     I will update her med list to include Jardiance and she can keep her regularly scheduled follow ups with PCP/endo.

## 2025-05-09 NOTE — TELEPHONE ENCOUNTER
Attempted to call patient 4 times no answer Vm full. If pt calls back in please relay message from Johana

## 2025-05-12 NOTE — TELEPHONE ENCOUNTER
Spoke with patient. She states she is not taking Jardiance and was told by GI not to, she thinks. She is asking what she is suppose to do with all the unopened bottles.    Please advise

## 2025-05-13 ENCOUNTER — PATIENT OUTREACH (OUTPATIENT)
Dept: CASE MANAGEMENT | Facility: OTHER | Age: 80
End: 2025-05-13

## 2025-05-13 NOTE — PROGRESS NOTES
Chart review indicates that an order was placed to follow up with pt regarding SDOH needs by OP RN. Also for potential caregiver support. Pt lives w spouse and daughter. Daughter doesn't assist with all things. Pt reports financial concerns around ambulance and hospital bills. Appear access to food may be difficult. Pt does follow with [facility], takes medication.     [employer] notes reviewed. RN notes and prior PCP OV notes reviewed.    SWCM outreached patient to assess above. Phone rang and rang, unable to leave VM. SWCM tried again and appeared phone was picked up and hung up, occurred two times. SWCM will try again at another time.

## 2025-05-15 ENCOUNTER — OFFICE VISIT (OUTPATIENT)
Dept: BEHAVIORAL/MENTAL HEALTH CLINIC | Facility: CLINIC | Age: 80
End: 2025-05-15
Payer: COMMERCIAL

## 2025-05-15 ENCOUNTER — TELEPHONE (OUTPATIENT)
Dept: PSYCHIATRY | Facility: CLINIC | Age: 80
End: 2025-05-15

## 2025-05-15 ENCOUNTER — PATIENT OUTREACH (OUTPATIENT)
Dept: CASE MANAGEMENT | Facility: OTHER | Age: 80
End: 2025-05-15

## 2025-05-15 DIAGNOSIS — F41.1 GENERALIZED ANXIETY DISORDER: ICD-10-CM

## 2025-05-15 DIAGNOSIS — F32.A DEPRESSIVE DISORDER: Primary | ICD-10-CM

## 2025-05-15 PROCEDURE — 90834 PSYTX W PT 45 MINUTES: CPT | Performed by: SOCIAL WORKER

## 2025-05-15 NOTE — PROGRESS NOTES
"Outpatient Care Management note- follow up call    Chart review completed    Last RN CM outreach resulted in a referral to OP SW CM to follow up for possible caregiver support and food insecurity.     Call placed to the patient x3. First 2 calls where answered and hung up. The 3rd call was picked up and this RN CM quickly stated \"please don't hang up\" and identified self. Spouse answered \"ok\" and transferred the call to Yajaira. Yajaira indicated again that they receive several calls all day long from telemarketers and \"she will not justify hanging up since it is disruptive.\" This RN CM stated that the call was to follow up from last week and the patient reported \"I have an appt today and I don't have time for this, good-bye.\" Call disconnected by the patient.     Will attempt a call next week to follow up with the patient.   "

## 2025-05-15 NOTE — TELEPHONE ENCOUNTER
Nursing was asked to come into therapist's office to speak to Yajaira as she was having some difficulty with her breathing.  She appeared  to be in some distress and was unable to take deep breaths.  Vitals were stable with BP- 118/65, R-16, P-elevated at 91.  Yajaira reports falling on one side but having pain on the other.  She does report having x-rays done with normal findings.  Instructed her to follow up with her doctor to check for any further concerns.  Nursing left Yajaira with therapist to complete her office visit with assurance that she will follow up on status of fall.

## 2025-05-15 NOTE — BH TREATMENT PLAN
Outpatient Behavioral Health Psychotherapy Treatment Plan    Yajaira Marsh  1945     Date of Initial Psychotherapy Assessment: 05-25   Date of Current Treatment Plan: 05/15/25  Treatment Plan Target Date: 08-05-25  Treatment Plan Expiration Date: 08-05-25    Diagnosis:   No diagnosis found.    Area(s) of Need: verbalize depressive events, high anxiety    Long Term Goal 1 (in the client's own words): I will be able to live in a place were I am happy    Stage of Change: Pre-contemplation    Target Date for completion: 08-05-25     Anticipated therapeutic modalities: CBT, MI     People identified to complete this goal: Sophie CONKLIN      Objective 1: (identify the means of measuring success in meeting the objective): I will find a place that I can be happy to live.       Objective 2: (identify the means of measuring success in meeting the objective): I want to reduce my depression and anxiety to a comfortable level.        ShortTerm Goal 1 (in the client's own words): I will keep all my therapy and pcp appt.     Stage of Change: Pre-contemplation  Target Date for completion: 08-05-25   Anticipated therapeutic modalities: cbt, mi   People identified to complete this goal: Sophie PACE     Objective 1: (identify the means of measuring success in meeting the objective): Patient will use at lease three of her coping skills to reduce depression and anxiety. She will utilize breathing skills to reduce anxiety.  Measuring depression and anxiety by scaling questions 1-10      Objective 2: (identify the means of measuring success in meeting the objective): Patient will talke to her PCP about anxiety meds.         I am currently under the care of a [facility] psychiatric provider: yes    My . [facility] psychiatric provider is: ROSS Stevens    I am currently taking psychiatric medications: Yes, as prescribed    I feel that I will be ready for discharge from mental health care when I reach the following (measurable goal/objective):  less depressed and anxious. Have a place to feel happy    For children and adults who have a legal guardian:   Has there been any change to custody orders and/or guardianship status? N/A. If yes, attach updated documentation.    I have created my Crisis Plan and have been offered a copy of this plan    Behavioral Health Treatment Plan [facility]: Diagnosis and Treatment Plan explained to Yajaira Marsh acknowledges an understanding of their diagnosis. Yajaira Marsh agrees to this treatment plan.    I have been offered a copy of this Treatment Plan. yes

## 2025-05-15 NOTE — PSYCH
Patient was on time for session.  During the session patient slowly became short of breath and difficult time breathing.  She reported that she  fell about 2 months ago on her right side and her side and back hurt when she breaths.  The longer we talked the more her breathing became labored.  This writer notified the nurse (Drea) who took her BP which was normal and pulse was a little wanda. Patient is not on any anxiety medication, but suggested that she go to Urgent care or the ED.  She does not have an appointment with her pcp until June. She seemed better and this writer walked her to her care with her  waiting.

## 2025-05-20 ENCOUNTER — PATIENT OUTREACH (OUTPATIENT)
Dept: CASE MANAGEMENT | Facility: OTHER | Age: 80
End: 2025-05-20

## 2025-05-20 NOTE — LETTER
[address] [facility] [id number]  [phone]    Re: UTR   5/20/2025       Dear Yajaira,    I am a [facility]  and wanted to be certain you had information to contact me should you desire assistance with or have questions about non-medical aspects of your care such as [but not limited to] medical insurance, housing, transportation, material needs, or emergency needs.  If I do not have an answer I will assist you in finding the appropriate agency or individual who can help.      Please feel free to contact me at [phone]. Thank You.    Sincerely,         Agatha Steward

## 2025-05-20 NOTE — PROGRESS NOTES
CAROL SANFORD received a referral from RN CM regarding patient's need for social work follow up. CAROL SANFORD team has made multiple attempts to contact patient to assist, however, attempts to reach patient have been unsuccessful at this time. UTR letter sent. Referral will be closed, however, CAROL SANFORD will be available if call back is received or via new order.

## 2025-05-22 DIAGNOSIS — F33.1 MAJOR DEPRESSIVE DISORDER, RECURRENT, MODERATE (HCC): ICD-10-CM

## 2025-05-22 RX ORDER — MIRTAZAPINE 15 MG/1
15 TABLET, FILM COATED ORAL
Qty: 90 TABLET | Refills: 0 | Status: SHIPPED | OUTPATIENT
Start: 2025-05-22

## 2025-05-28 ENCOUNTER — OFFICE VISIT (OUTPATIENT)
Dept: DIABETES SERVICES | Facility: CLINIC | Age: 80
End: 2025-05-28
Attending: INTERNAL MEDICINE
Payer: COMMERCIAL

## 2025-05-28 ENCOUNTER — NURSE TRIAGE (OUTPATIENT)
Age: 80
End: 2025-05-28

## 2025-05-28 VITALS — WEIGHT: 162.2 LBS | BODY MASS INDEX: 30.65 KG/M2

## 2025-05-28 DIAGNOSIS — E11.65 TYPE 2 DIABETES MELLITUS WITH HYPERGLYCEMIA, WITH LONG-TERM CURRENT USE OF INSULIN (HCC): Primary | ICD-10-CM

## 2025-05-28 DIAGNOSIS — Z79.4 TYPE 2 DIABETES MELLITUS WITH HYPERGLYCEMIA, WITH LONG-TERM CURRENT USE OF INSULIN (HCC): Primary | ICD-10-CM

## 2025-05-28 PROCEDURE — 97802 MEDICAL NUTRITION INDIV IN: CPT

## 2025-05-28 NOTE — TELEPHONE ENCOUNTER
Attempted to call patient to discuss and schedule an appointment. Line answered but then quickly disconnected.   Former smoker

## 2025-05-28 NOTE — PROGRESS NOTES
"Medical Nutrition Therapy    Assessment    Visit Type: Initial visit  Chief complaint/Medical Diagnosis/reason for visit: E11.65, Z79.4 (ICD-10-CM) - Type 2 diabetes mellitus with hyperglycemia, with long-term current use of insulin ([facility])     HPI Yajaira Marsh was seen today regarding type 2 diabetes. Last HbA1c was 11.1% in March 2025. Conducted dietary recall. See below for details. Yajaira reports of altered taste. States things do not taste like they should. She reports she was not always eating regular meals as food did not sound appealing at times; however, she does report her appetite has been improving. Eating 2 meals a day lately. She does consume some sugar-sweetened beverages such as Dillan-D. She reports of eating out often as she does not feel like cooking. Problems identified in food recall include inconsistent carbohydrate intake. Explained basic pathophysiology of diabetes and impact of diet on blood glucose levels. Together we discussed what foods contain carbohydrates, reading a food label, timing of meals and snacks, serving sizes, the role of fiber, the importance of consistent carbohydrate intake in the appropriate amounts. Used the portion booklet to teach Yajaira more about food groups and basic carbohydrate counting. Encouraged 3 regular meals ~4-5 hours apart with 1-2 snacks per day as needed. Discussed keeping carbohydrate intake consistent. Goal is 45-60 grams of carbohydrate per meal and 0-15 grams of carbohydrate per snack. Yajaira demonstrated good understanding and will call with any questions or if she would like to schedule a follow-up visit.    Ht Readings from Last 1 Encounters:   05/30/25 5' 1\" (1.549 m)     Wt Readings from Last 3 Encounters:   05/30/25 74.8 kg (165 lb)   05/29/25 73.6 kg (162 lb 3.2 oz)   05/28/25 73.6 kg (162 lb 3.2 oz)     Barriers to Learning: no barriers    Do you follow any special diet presently?: No  Who shops: spouse  Who cooks: patient    Food Log: " Completed via the method of usual daily food recall.     Breakfast: typically skips breakfast, drinks diet caffeine-free coke or Dillan-D  Morning Snack: none  Lunch: if eating at home - may have cheerios with half a banana with 1% milk  Afternoon Snack: may have a donut or muffin  Dinner: spaghetti or pork chops and corn on the cob last night, eats out often (varies greatly per patient)  Evening Snack: none  Beverages: does not drink a lot of water, Dillan-D, diet caffeine-free coke  Eating out/Take out: eating out often, not cooking as often because she does not feel well  Exercise: no regular physical activity at present, mostly sedentary     Estimated calorie needs: 1,200 kcals/day   Carbohydrate: ~30 g/meal, 0-15 g/snack       Fat: 3 servings/day      Protein: 5 ounces/day    Nutrition Diagnosis:  Inconsistent carbohydrate intake related to food and nutrition related knowledge deficit concerning appropriate amount and timing of carbohydrate intake as evidenced by estimated carbohydrate intake that is different from recommended types or ingested on an irregular basis.    Intervention: increased fiber intake, label reading, behavior modification strategies, carbohydrate counting, meal timing, meal planning, and monitoring portion control     Treatment Goals: Patient understands education and recommendations, Patient will consume 3 meals a day, Patient will monitor portion control, Patient will consume snacks, and Patient will count carbohydrates    Monitoring and evaluation:    Term code indicator  FH 4.4 Mealtime Behavior Criteria: Eat 3 regular meals ~4-5 hours apart with 1-2 snacks per day as needed.  Term code indicator  FH 1.6.3 Carbohydrate Intake Criteria: Keep carbohydrate intake consistent. Aim for ~30 grams of carbohydrate per meal and 0-15 grams of carbohydrate per snack.  Term code indicator  CH 2.2 Treatments/Therapy/Alternative Medicine Criteria: Rotate insulin injection sites.    Materials Provided:  Portion booklet    Patient’s Response to Instruction:  Comprehension: good  Motivation: good  Expected Compliance: good    Start- Stop: 1:40-2:40  Total Minutes: 60 Minutes  Group or Individual Instruction: MNT-I  Other: Ranjith Pope MD    Thank you for coming to the [facility] for education today. Please feel free to call with any questions or concerns.    Amada Carroll, MS, RD, LDN  [address]  [address]  [place] [id number]

## 2025-05-28 NOTE — TELEPHONE ENCOUNTER
"Reason for Disposition   Weakness from poor fluid intake    Answer Assessment - Initial Assessment Questions  1. DESCRIPTION: \"Describe how you are feeling.\"        Weak tired has not ate breakfast has not taken diabetic medications    States limited food in the house     now dexcoms reading      States grullon snot have much food    2. SEVERITY: \"How bad is it?\"  \"Can you stand and walk?\"        Yes able tow alk stand     3. ONSET: \"When did these symptoms begin?\" (e.g., hours, days, weeks, months)          Today       4. CAUSE: \"What do you think is causing the weakness or fatigue?\" (e.g., not drinking enough fluids, medical problem, trouble sleeping)            Patient has not ate anything yet it is 11 am  calls feels weak. Patient needs to  eat and take her diabetic medications     5. NEW MEDICINES:  \"Have you started on any new medicines recently?\" (e.g., opioid pain medicines, benzodiazepines, muscle relaxants, antidepressants, antihistamines, neuroleptics, beta blockers)        No     6. OTHER SYMPTOMS: \"Do you have any other symptoms?\" (e.g., chest pain, fever, cough, SOB, vomiting, diarrhea, bleeding, other areas of pain)      No    Protocols used: Weakness (Generalized) and Fatigue-Adult-[place]  REASON FOR CONVERSATION: Fatigue    SYMPTOMS: weakness shakey    OTHER HEALTH INFORMATION: Feeling weak & shaky. Patient is diabetic and has not ate anything yet and has not take  her medications. BS currently 278. Encouraged patient to eat , drink, take medications.  Patient states  she has limited food.  Peanut butter crackers and grapes are available to eat right now. Denies feeling faint, dizzy, chest pain SOB, fevers.  Patient sounds SOB via phone.  States she gets anxious and will sometimes feel shortness of breath intermittently but this is her baseline.      is home with patient. Patient states she needs to hang up .  Call disconnected.          PROTOCOL DISPOSITION: No disposition on " file.    CARE ADVICE PROVIDED: Eat breakfast now and take diabetic medications.   Re check sugar . Rest, hydrate. Call back if no improvement .  Go to Emergency Room if symptoms worsen or do not improve.      PRACTICE FOLLOW-UP: Yes  follow up call to check in on the patient.

## 2025-05-29 ENCOUNTER — APPOINTMENT (EMERGENCY)
Dept: RADIOLOGY | Facility: HOSPITAL | Age: 80
DRG: 176 | End: 2025-05-29
Payer: COMMERCIAL

## 2025-05-29 ENCOUNTER — OFFICE VISIT (OUTPATIENT)
Dept: INTERNAL MEDICINE CLINIC | Facility: CLINIC | Age: 80
End: 2025-05-29
Payer: COMMERCIAL

## 2025-05-29 ENCOUNTER — PATIENT OUTREACH (OUTPATIENT)
Dept: CASE MANAGEMENT | Facility: OTHER | Age: 80
End: 2025-05-29

## 2025-05-29 ENCOUNTER — HOSPITAL ENCOUNTER (INPATIENT)
Facility: HOSPITAL | Age: 80
LOS: 3 days | Discharge: HOME | DRG: 176 | End: 2025-06-02
Attending: EMERGENCY MEDICINE | Admitting: INTERNAL MEDICINE
Payer: COMMERCIAL

## 2025-05-29 VITALS
HEART RATE: 94 BPM | HEIGHT: 61 IN | BODY MASS INDEX: 30.62 KG/M2 | DIASTOLIC BLOOD PRESSURE: 60 MMHG | SYSTOLIC BLOOD PRESSURE: 126 MMHG | OXYGEN SATURATION: 98 % | WEIGHT: 162.2 LBS | TEMPERATURE: 97.8 F

## 2025-05-29 DIAGNOSIS — R94.31 T WAVE INVERSION IN EKG: ICD-10-CM

## 2025-05-29 DIAGNOSIS — R48.2 APRAXIA: ICD-10-CM

## 2025-05-29 DIAGNOSIS — N28.89 KIDNEY MASS: ICD-10-CM

## 2025-05-29 DIAGNOSIS — H43.89 VITREO-RETINAL ADHESIONS: ICD-10-CM

## 2025-05-29 DIAGNOSIS — E11.42 DIABETIC POLYNEUROPATHY ASSOCIATED WITH TYPE 2 DIABETES MELLITUS (HCC): Chronic | ICD-10-CM

## 2025-05-29 DIAGNOSIS — B35.1 ONYCHOMYCOSIS: ICD-10-CM

## 2025-05-29 DIAGNOSIS — F33.2 SEVERE EPISODE OF RECURRENT MAJOR DEPRESSIVE DISORDER, WITHOUT PSYCHOTIC FEATURES (HCC): ICD-10-CM

## 2025-05-29 DIAGNOSIS — E16.2 HYPOGLYCEMIA: ICD-10-CM

## 2025-05-29 DIAGNOSIS — F41.1 GENERALIZED ANXIETY DISORDER: ICD-10-CM

## 2025-05-29 DIAGNOSIS — G31.84 MCI (MILD COGNITIVE IMPAIRMENT): ICD-10-CM

## 2025-05-29 DIAGNOSIS — R91.1 LUNG NODULE: ICD-10-CM

## 2025-05-29 DIAGNOSIS — R26.2 AMBULATORY DYSFUNCTION: ICD-10-CM

## 2025-05-29 DIAGNOSIS — Z86.718 HISTORY OF DVT (DEEP VEIN THROMBOSIS): Chronic | ICD-10-CM

## 2025-05-29 DIAGNOSIS — Z79.4 TYPE 2 DIABETES MELLITUS WITH HYPERGLYCEMIA, WITH LONG-TERM CURRENT USE OF INSULIN (HCC): Chronic | ICD-10-CM

## 2025-05-29 DIAGNOSIS — E87.6 HYPOKALEMIA: ICD-10-CM

## 2025-05-29 DIAGNOSIS — G47.33 OSA (OBSTRUCTIVE SLEEP APNEA): ICD-10-CM

## 2025-05-29 DIAGNOSIS — R94.31 EKG ABNORMALITY: ICD-10-CM

## 2025-05-29 DIAGNOSIS — R06.02 SHORTNESS OF BREATH: Primary | ICD-10-CM

## 2025-05-29 DIAGNOSIS — N20.0 KIDNEY STONE: ICD-10-CM

## 2025-05-29 DIAGNOSIS — Z13.9 ENCOUNTER FOR SCREENING INVOLVING SOCIAL DETERMINANTS OF HEALTH (SDOH): ICD-10-CM

## 2025-05-29 DIAGNOSIS — F32.A DEPRESSIVE DISORDER: ICD-10-CM

## 2025-05-29 DIAGNOSIS — M54.16 LUMBAR RADICULOPATHY: ICD-10-CM

## 2025-05-29 DIAGNOSIS — N28.9 RENAL INSUFFICIENCY: ICD-10-CM

## 2025-05-29 DIAGNOSIS — R68.2 DRY MOUTH: ICD-10-CM

## 2025-05-29 DIAGNOSIS — E11.65 TYPE 2 DIABETES MELLITUS WITH HYPERGLYCEMIA, WITH LONG-TERM CURRENT USE OF INSULIN (HCC): Chronic | ICD-10-CM

## 2025-05-29 DIAGNOSIS — B02.9 HERPES ZOSTER WITHOUT COMPLICATION: ICD-10-CM

## 2025-05-29 DIAGNOSIS — I10 ESSENTIAL HYPERTENSION: ICD-10-CM

## 2025-05-29 DIAGNOSIS — R80.9 MICROALBUMINURIA: ICD-10-CM

## 2025-05-29 DIAGNOSIS — F32.0 CURRENT MILD EPISODE OF MAJOR DEPRESSIVE DISORDER WITHOUT PRIOR EPISODE (HCC): ICD-10-CM

## 2025-05-29 DIAGNOSIS — R06.02 SHORTNESS OF BREATH: ICD-10-CM

## 2025-05-29 DIAGNOSIS — L40.50 PSORIASIS WITH ARTHROPATHY (HCC): ICD-10-CM

## 2025-05-29 DIAGNOSIS — R94.31 ABNORMAL EKG: ICD-10-CM

## 2025-05-29 DIAGNOSIS — F41.9 ANXIETY: ICD-10-CM

## 2025-05-29 DIAGNOSIS — I26.99 PULMONARY EMBOLISM (HCC): ICD-10-CM

## 2025-05-29 DIAGNOSIS — I26.99 OTHER ACUTE PULMONARY EMBOLISM, UNSPECIFIED WHETHER ACUTE COR PULMONALE PRESENT (HCC): Primary | ICD-10-CM

## 2025-05-29 DIAGNOSIS — E55.9 VITAMIN D DEFICIENCY: ICD-10-CM

## 2025-05-29 DIAGNOSIS — J34.89 RHINORRHEA: ICD-10-CM

## 2025-05-29 DIAGNOSIS — I65.23 ATHEROSCLEROSIS OF BOTH CAROTID ARTERIES: ICD-10-CM

## 2025-05-29 DIAGNOSIS — E53.8 VITAMIN B12 DEFICIENCY: ICD-10-CM

## 2025-05-29 DIAGNOSIS — Z95.1 S/P CABG (CORONARY ARTERY BYPASS GRAFT): ICD-10-CM

## 2025-05-29 DIAGNOSIS — M47.26 OSTEOARTHRITIS OF SPINE WITH RADICULOPATHY, LUMBAR REGION: ICD-10-CM

## 2025-05-29 DIAGNOSIS — E66.9 OBESITY (BMI 30-39.9): ICD-10-CM

## 2025-05-29 DIAGNOSIS — F41.1 GAD (GENERALIZED ANXIETY DISORDER): ICD-10-CM

## 2025-05-29 LAB
ALBUMIN SERPL BCG-MCNC: 3.6 G/DL (ref 3.5–5)
ALP SERPL-CCNC: 84 U/L (ref 34–104)
ALT SERPL W P-5'-P-CCNC: 12 U/L (ref 7–52)
ANION GAP SERPL CALCULATED.3IONS-SCNC: 9 MMOL/L (ref 4–13)
APTT PPP: 22 SECONDS (ref 23–34)
AST SERPL W P-5'-P-CCNC: 18 U/L (ref 13–39)
BASOPHILS # BLD AUTO: 0.02 THOUSANDS/ÂΜL (ref 0–0.1)
BASOPHILS NFR BLD AUTO: 0 % (ref 0–1)
BILIRUB SERPL-MCNC: 0.49 MG/DL (ref 0.2–1)
BUN SERPL-MCNC: 20 MG/DL (ref 5–25)
CALCIUM SERPL-MCNC: 9.3 MG/DL (ref 8.4–10.2)
CARDIAC TROPONIN I PNL SERPL HS: 14 NG/L (ref ?–50)
CHLORIDE SERPL-SCNC: 109 MMOL/L (ref 96–108)
CO2 SERPL-SCNC: 22 MMOL/L (ref 21–32)
CREAT SERPL-MCNC: 1.18 MG/DL (ref 0.6–1.3)
D DIMER PPP FEU-MCNC: 2.51 UG/ML FEU
EOSINOPHIL # BLD AUTO: 0.04 THOUSAND/ÂΜL (ref 0–0.61)
EOSINOPHIL NFR BLD AUTO: 0 % (ref 0–6)
ERYTHROCYTE [DISTWIDTH] IN BLOOD BY AUTOMATED COUNT: 14.6 % (ref 11.6–15.1)
GFR SERPL CREATININE-BSD FRML MDRD: 44 ML/MIN/1.73SQ M
GLUCOSE SERPL-MCNC: 132 MG/DL (ref 65–140)
HCT VFR BLD AUTO: 35.8 % (ref 34.8–46.1)
HGB BLD-MCNC: 11.7 G/DL (ref 11.5–15.4)
IMM GRANULOCYTES # BLD AUTO: 0.04 THOUSAND/UL (ref 0–0.2)
IMM GRANULOCYTES NFR BLD AUTO: 0 % (ref 0–2)
INR PPP: 1.01 (ref 0.85–1.19)
LYMPHOCYTES # BLD AUTO: 2.55 THOUSANDS/ÂΜL (ref 0.6–4.47)
LYMPHOCYTES NFR BLD AUTO: 27 % (ref 14–44)
MCH RBC QN AUTO: 28.4 PG (ref 26.8–34.3)
MCHC RBC AUTO-ENTMCNC: 32.7 G/DL (ref 31.4–37.4)
MCV RBC AUTO: 87 FL (ref 82–98)
MONOCYTES # BLD AUTO: 0.66 THOUSAND/ÂΜL (ref 0.17–1.22)
MONOCYTES NFR BLD AUTO: 7 % (ref 4–12)
NEUTROPHILS # BLD AUTO: 6.12 THOUSANDS/ÂΜL (ref 1.85–7.62)
NEUTS SEG NFR BLD AUTO: 66 % (ref 43–75)
NRBC BLD AUTO-RTO: 0 /100 WBCS
PLATELET # BLD AUTO: 212 THOUSANDS/UL (ref 149–390)
PMV BLD AUTO: 11.6 FL (ref 8.9–12.7)
POTASSIUM SERPL-SCNC: 4 MMOL/L (ref 3.5–5.3)
PROT SERPL-MCNC: 7 G/DL (ref 6.4–8.4)
PROTHROMBIN TIME: 13.6 SECONDS (ref 12.3–15)
RBC # BLD AUTO: 4.12 MILLION/UL (ref 3.81–5.12)
SODIUM SERPL-SCNC: 140 MMOL/L (ref 135–147)
WBC # BLD AUTO: 9.43 THOUSAND/UL (ref 4.31–10.16)

## 2025-05-29 PROCEDURE — 85730 THROMBOPLASTIN TIME PARTIAL: CPT

## 2025-05-29 PROCEDURE — 99285 EMERGENCY DEPT VISIT HI MDM: CPT

## 2025-05-29 PROCEDURE — 36000 PLACE NEEDLE IN VEIN: CPT | Performed by: EMERGENCY MEDICINE

## 2025-05-29 PROCEDURE — 93005 ELECTROCARDIOGRAM TRACING: CPT

## 2025-05-29 PROCEDURE — 80053 COMPREHEN METABOLIC PANEL: CPT

## 2025-05-29 PROCEDURE — G2211 COMPLEX E/M VISIT ADD ON: HCPCS

## 2025-05-29 PROCEDURE — 99285 EMERGENCY DEPT VISIT HI MDM: CPT | Performed by: EMERGENCY MEDICINE

## 2025-05-29 PROCEDURE — 99214 OFFICE O/P EST MOD 30 MIN: CPT

## 2025-05-29 PROCEDURE — 84484 ASSAY OF TROPONIN QUANT: CPT

## 2025-05-29 PROCEDURE — 76942 ECHO GUIDE FOR BIOPSY: CPT | Performed by: EMERGENCY MEDICINE

## 2025-05-29 PROCEDURE — 71046 X-RAY EXAM CHEST 2 VIEWS: CPT

## 2025-05-29 PROCEDURE — 71275 CT ANGIOGRAPHY CHEST: CPT

## 2025-05-29 PROCEDURE — 85610 PROTHROMBIN TIME: CPT

## 2025-05-29 PROCEDURE — 36415 COLL VENOUS BLD VENIPUNCTURE: CPT

## 2025-05-29 PROCEDURE — 85025 COMPLETE CBC W/AUTO DIFF WBC: CPT

## 2025-05-29 PROCEDURE — 85379 FIBRIN DEGRADATION QUANT: CPT

## 2025-05-29 PROCEDURE — 93000 ELECTROCARDIOGRAM COMPLETE: CPT

## 2025-05-29 RX ORDER — MIDAZOLAM HYDROCHLORIDE 2 MG/2ML
1 INJECTION, SOLUTION INTRAMUSCULAR; INTRAVENOUS ONCE
Status: COMPLETED | OUTPATIENT
Start: 2025-05-29 | End: 2025-05-29

## 2025-05-29 RX ADMIN — MIDAZOLAM 1 MG: 1 INJECTION INTRAMUSCULAR; INTRAVENOUS at 20:25

## 2025-05-29 RX ADMIN — IOHEXOL 75 ML: 350 INJECTION, SOLUTION INTRAVENOUS at 23:18

## 2025-05-29 RX ADMIN — MIDAZOLAM 1 MG: 1 INJECTION INTRAMUSCULAR; INTRAVENOUS at 22:12

## 2025-05-29 NOTE — LETTER
Date: 05/29/25    Dear Yajaira Marsh,   My name is Marilia; I am a registered nurse care manager working with [facility] within [facility].   I have not been able to reach you and would like to set a time that I can talk with you over the phone.  My work is to help patients that have complex medical conditions get the care they need. This includes patients who may have been in the hospital or emergency room.    Please call me with any questions you may have. I look forward to speaking with you.    Sincerely,  Marilia JUDD, RN  [phone]  Outpatient Registered Nurse Care Manager

## 2025-05-29 NOTE — ED PROVIDER NOTES
Time reflects when diagnosis was documented in both MDM as applicable and the Disposition within this note       Time User Action Codes Description Comment    5/29/2025  9:33 PM KoEdilson michelein Add [R06.02] Shortness of breath     5/29/2025  9:34 PM Koryne, Oswaldo Add [R94.31] T wave inversion in EKG     5/30/2025 12:55 AM Kover, Oswaldo Add [I26.99] Pulmonary emboli (HCC)     5/30/2025 12:55 AM Kover, Oswaldo Modify [R06.02] Shortness of breath     5/30/2025 12:55 AM Kover, Oswaldo Modify [I26.99] Pulmonary emboli (HCC)     5/30/2025  1:05 AM Kover, Oswaldo Add [I26.99] Pulmonary embolism (HCC)     5/30/2025  1:05 AM Kover, Oswaldo Remove [I26.99] Pulmonary embolism (HCC)     5/30/2025  1:05 AM Kover, Oswaldo Add [I26.99] Pulmonary embolism (HCC)     5/30/2025  1:05 AM Kover, Oswaldo Modify [I26.99] Pulmonary emboli (HCC)     5/30/2025  1:05 AM Kover, Oswaldo Modify [I26.99] Pulmonary embolism (HCC)     5/30/2025  1:05 AM Kover, Oswaldo Remove [I26.99] Pulmonary emboli (HCC)           ED Disposition       ED Disposition   Admit    Condition   Stable    Date/Time   Thu May 29, 2025  9:33 PM    Comment   --             Assessment & Plan       Medical Decision Making  Amount and/or Complexity of Data Reviewed  Labs: ordered. Decision-making details documented in ED Course.  Radiology: ordered. Decision-making details documented in ED Course.  ECG/medicine tests:  Decision-making details documented in ED Course.    Risk  Prescription drug management.  Decision regarding hospitalization.      Patient is a 79 y.o. female with a past medical history of CAD, DVT, hypertension, hyperlipidemia, diabetes, CKD, hypothyroidism presenting for gradually worsening shortness of breath and orthopnea since the end of March.      Vital signs hypertensive and tachypneic but otherwise stable. On exam mild bilateral lower extremity edema.    History, imaging, and physical exam most consistent with pulmonary embolism and new T wave inversions. However,  "differential diagnosis included but not limited to arrhythmia, acute coronary syndrome, anemia, electrolyte abnormality, acute kidney injury, pneumonia, pneumothorax.     Plan: EKG, CBC, CMP, troponin, D-dimer, CXR, Versed, coagulation studies    View ED course for further discussion on patient workup.     On review of previous records patient was seen by her PCP today and was advised to go into the ED.    All labs reviewed and utilized in the medical decision making process  All radiology studies independently viewed by me and interpreted by the radiologist.  I reviewed all testing with the patient.     Upon re-evaluation patient is resting comfortably in stretcher in no acute distress.    Disposition: Admitted to [facility] for pulmonary embolism and new EKG changes.    Portions of the record may have been created with voice recognition software. Occasional wrong word or \"sound a like\" substitutions may have occurred due to the inherent limitations of voice recognition software. Read the chart carefully and recognize, using context, where substitutions have occurred.    ED Course as of 05/30/25 0110   Thu May 29, 2025   1808 ECG 12 lead  Normal rate, regular rhythm, left axis deviation.  P waves present.  No obvious ST segment elevations.  T wave inversions noted in leads V4, V5, and V6.  EKG interpreted as normal sinus rhythm with nonspecific T wave inversions.  EKG similar to prior from April 2025, although T wave inversions are difficult to appreciate in the older EKG secondary to artifact.   2053 D-Dimer, Quant(!): 2.51  Will obtain CTA PE study   2056 PROTIME: 13.6   2056 POCT INR: 1.01   2056 Comprehensive metabolic panel(!)  Stable   2056 PTT(!): 22   2056 CBC and differential  Unremarkable   2057 hs TnI 0hr: 14   2157 Per RN, patient requesting anxiety medication.  Will give additional Versed.   2201 XR chest 2 views  IMPRESSION:     No acute cardiopulmonary disease     Fri May 30, 2025   0051 Per [facility], patient " has a pulmonary embolism.  Will start high-dose heparin.       Medications   heparin (porcine) injection 5,600 Units (has no administration in time range)   heparin (porcine) 25,000 units in 0.45% NaCl 250 mL infusion (premix) (has no administration in time range)   heparin (porcine) injection 5,600 Units (has no administration in time range)   heparin (porcine) injection 2,800 Units (has no administration in time range)   midazolam (VERSED) injection 1 mg (1 mg Intravenous Given 5/29/25 2025)   midazolam (VERSED) injection 1 mg (1 mg Intravenous Given 5/29/25 2212)   iohexol (OMNIPAQUE) 350 MG/ML injection (MULTI-DOSE) 75 mL (75 mL Intravenous Given 5/29/25 2318)       ED Risk Strat Scores          HEART Risk Score      Flowsheet Row Most Recent Value   Heart Score Risk Calculator    History 1 Filed at: 05/29/2025 1610   ECG 1 Filed at: 05/29/2025 1610   Age 2 Filed at: 05/29/2025 1610   Risk Factors 2 Filed at: 05/29/2025 1610   Troponin 1 Filed at: 05/29/2025 1610   HEART Score 7 Filed at: 05/29/2025 1610                      No data recorded        SBIRT 20yo+      Flowsheet Row Most Recent Value   Initial Alcohol Screen: US AUDIT-C     1. How often do you have a drink containing alcohol? 0 Filed at: 05/29/2025 1748   2. How many drinks containing alcohol do you have on a typical day you are drinking?  0 Filed at: 05/29/2025 1748   3a. Male UNDER 65: How often do you have five or more drinks on one occasion? 0 Filed at: 05/29/2025 1748   3b. FEMALE Any Age, or MALE 65+: How often do you have 4 or more drinks on one occassion? 0 Filed at: 05/29/2025 1748   Audit-C Score 0 Filed at: 05/29/2025 1748   GER[place]: How many times in the past year have you...    Used an illegal drug or used a prescription medication for non-medical reasons? Never Filed at: 05/29/2025 1748                            History of Present Illness       Chief Complaint   Patient presents with    Shortness of Breath     Pt has been having  increased SOB, sent from pcp for CHF or possible PE, denies CP       Past Medical History[1]   Past Surgical History[2]   Family History[3]   Social History[4]   E-Cigarette/Vaping    E-Cigarette Use Never User       E-Cigarette/Vaping Substances    Nicotine No     THC No     CBD No     Flavoring No     Other No     Unknown No       I have reviewed and agree with the history as documented.       Shortness of Breath    Patient is a 79-year-old female with a past medical history of CAD, DVT, hypertension, hyperlipidemia, diabetes, CKD, hypothyroidism presenting for gradually worsening shortness of breath and orthopnea since the end of March.  Patient states she has a history of lower extremity DVT but no history of PE.  She traveled for about 3 hours on a recent car ride.  She was seen by her PCP today and she was advised to go into the ED to rule out CHF.  Patient does not take any blood thinners nor diuretics.  Patient denies chest pain, fevers, chills, productive cough, hemoptysis, recent surgeries, recent cancer treatments, and calf pain/swelling.    Review of Systems   Respiratory:  Positive for shortness of breath.    All other systems reviewed and are negative.          Objective       ED Triage Vitals [05/29/25 1747]   Temperature Pulse Blood Pressure Respirations SpO2 Patient Position - Orthostatic VS   98.5 °F (36.9 °C) 85 (!) 194/76 22 100 % --      Temp Source Heart Rate Source BP Location FiO2 (%) Pain Score    Temporal Monitor Left arm -- --      Vitals      Date and Time Temp Pulse SpO2 Resp BP Pain Score FACES Pain Rating User   05/29/25 1747 98.5 °F (36.9 °C) 85 100 % 22 194/76 -- -- JS            Physical Exam  Vitals and nursing note reviewed.   Constitutional:       General: She is not in acute distress.     Appearance: Normal appearance. She is not ill-appearing, toxic-appearing or diaphoretic.   HENT:      Head: Normocephalic and atraumatic.     Eyes:      General: No scleral icterus.         Right eye: No discharge.         Left eye: No discharge.      Extraocular Movements: Extraocular movements intact.      Conjunctiva/sclera: Conjunctivae normal.      Pupils: Pupils are equal, round, and reactive to light.       Cardiovascular:      Rate and Rhythm: Normal rate and regular rhythm.      Pulses: Normal pulses.      Heart sounds: Normal heart sounds. No murmur heard.  Pulmonary:      Effort: Pulmonary effort is normal. Tachypnea present. No respiratory distress.      Breath sounds: Normal breath sounds. No stridor. No wheezing, rhonchi or rales.   Abdominal:      General: Bowel sounds are normal. There is no distension.      Palpations: Abdomen is soft.      Tenderness: There is no abdominal tenderness. There is no right CVA tenderness, left CVA tenderness, guarding or rebound. Negative signs include Rodriguez's sign and McBurney's sign.     Musculoskeletal:         General: Normal range of motion.      Cervical back: Normal range of motion and neck supple. No tenderness.      Right lower leg: Edema present.      Left lower leg: Edema present.     Skin:     General: Skin is warm and dry.      Coloration: Skin is not jaundiced.      Findings: No erythema.     Neurological:      General: No focal deficit present.      Mental Status: She is alert and oriented to person, place, and time.      Cranial Nerves: Cranial nerves 2-12 are intact. No cranial nerve deficit.      Sensory: Sensation is intact. No sensory deficit.      Motor: Motor function is intact. No weakness.     Psychiatric:         Mood and Affect: Mood normal.         Behavior: Behavior normal.         Thought Content: Thought content normal.         Judgment: Judgment normal.         Results Reviewed       Procedure Component Value Units Date/Time    APTT [[id number]]     Lab Status: No result Specimen: Blood     HS Troponin I 4hr [[id number]]     Lab Status: No result Specimen: Blood     HS Troponin I 2hr [[id number]]     Lab Status: No result  Specimen: Blood     HS Troponin 0hr (reflex protocol) [[id number]]  (Normal) Collected: 05/29/25 2019    Lab Status: Final result Specimen: Blood from Arm, Right Updated: 05/29/25 2054     hs TnI 0hr 14 ng/L     Comprehensive metabolic panel [[id number]]  (Abnormal) Collected: 05/29/25 2019    Lab Status: Final result Specimen: Blood from Arm, Right Updated: 05/29/25 2053     Sodium 140 mmol/L      Potassium 4.0 mmol/L      Chloride 109 mmol/L      CO2 22 mmol/L      ANION GAP 9 mmol/L      BUN 20 mg/dL      Creatinine 1.18 mg/dL      Glucose 132 mg/dL      Calcium 9.3 mg/dL      AST 18 U/L      ALT 12 U/L      Alkaline Phosphatase 84 U/L      Total Protein 7.0 g/dL      Albumin 3.6 g/dL      Total Bilirubin 0.49 mg/dL      eGFR 44 ml/min/1.73sq m     Narrative:      [employer] Kid[employer] guidelines for Chronic Kidney Disease (CKD):     Stage 1 with normal or high GFR (GFR > 90 mL/min/1.73 square meters)    Stage 2 Mild CKD (GFR = 60-89 mL/min/1.73 square meters)    Stage 3A Moderate CKD (GFR = 45-59 mL/min/1.73 square meters)    Stage 3B Moderate CKD (GFR = 30-44 mL/min/1.73 square meters)    Stage 4 Severe CKD (GFR = 15-29 mL/min/1.73 square meters)    Stage 5 End Stage CKD (GFR <15 mL/min/1.73 square meters)  Note: GFR calculation is accurate only with a steady state creatinine    D-dimer, quantitative [[id number]]  (Abnormal) Collected: 05/29/25 2019    Lab Status: Final result Specimen: Blood from Arm, Right Updated: 05/29/25 2052     D-Dimer, Quant 2.51 ug/ml FEU     Narrative:      In the evaluation for possible pulmonary embolism, in the appropriate (Well's Score of 4 or less) patient, the age adjusted d-dimer cutoff for this patient can be calculated as:    Age x 0.01 (in ug/mL) for Age-adjusted D-dimer exclusion threshold for a patient over 50 years.    Protime-INR [[id number]]  (Normal) Collected: 05/29/25 2019    Lab Status: Final result Specimen: Blood from Arm, Right Updated: 05/29/25 2047      Protime 13.6 seconds      INR 1.01    Narrative:      INR Therapeutic Range    Indication                                             INR Range      Atrial Fibrillation                                               2.0-3.0  Hypercoagulable State                                    2.0.2.3  Left Ventricular Asist Device                            2.0-3.0  Mechanical Heart Valve                                  -    Aortic(with afib, MI, embolism, HF, LA enlargement,    and/or coagulopathy)                                     2.0-3.0 (2.5-3.5)     Mitral                                                             2.5-3.5  Prosthetic/Bioprosthetic Heart Valve               2.0-3.0  Venous thromboembolism (VTE: VT, PE        2.0-3.0    APTT [[id number]]  (Abnormal) Collected: 05/29/25 2019    Lab Status: Final result Specimen: Blood from Arm, Right Updated: 05/29/25 2047     PTT 22 seconds     CBC and differential [[id number]] Collected: 05/29/25 2019    Lab Status: Final result Specimen: Blood from Arm, Right Updated: 05/29/25 2031     WBC 9.43 Thousand/uL      RBC 4.12 Million/uL      Hemoglobin 11.7 g/dL      Hematocrit 35.8 %      MCV 87 fL      MCH 28.4 pg      MCHC 32.7 g/dL      RDW 14.6 %      MPV 11.6 fL      Platelets 212 Thousands/uL      nRBC 0 /100 WBCs      Segmented % 66 %      Immature Grans % 0 %      Lymphocytes % 27 %      Monocytes % 7 %      Eosinophils Relative 0 %      Basophils Relative 0 %      Absolute Neutrophils 6.12 Thousands/µL      Absolute Immature Grans 0.04 Thousand/uL      Absolute Lymphocytes 2.55 Thousands/µL      Absolute Monocytes 0.66 Thousand/µL      Eosinophils Absolute 0.04 Thousand/µL      Basophils Absolute 0.02 Thousands/µL             XR chest 2 views   Final Interpretation by Santos Carrasco MD (05/29 2158)      No acute cardiopulmonary disease.            Workstation performed: [id number]         CTA chest pe study    (Results Pending)       Complex Venous Access  Line    Date/Time: 5/29/2025 9:58 PM    Performed by: Oswaldo Kent DO  Authorized by: Oswaldo Kent DO    Patient location:  ED  Other Assisting Provider: No    Consent:     Consent obtained:  Verbal    Consent given by:  Patient    Risks discussed:  Arterial puncture, bleeding, infection, incorrect placement, nerve damage and pneumothorax    Alternatives discussed:  Delayed treatment, no treatment, alternative treatment, observation and referral  Universal protocol:     Procedure explained and questions answered to patient or proxy's satisfaction: yes    Pre-procedure details:     Hand hygiene: Hand hygiene performed prior to insertion      Sterile barrier technique: All elements of maximal sterile technique followed      Skin preparation:  2% chlorhexidine    Skin preparation agent: Skin preparation agent completely dried prior to procedure    Procedure details:     Complex Venous Access Line Type: US Guided Peripheral IV      Peripheral IV Indications comment:  Difficulty obtaining peripheral venous access    Orientation:  Left    Location:  Antecubital    Catheter size:  18 gauge    Patient evaluated for contraindications to access (i.e. fistula, thrombosis, etc): Yes      Approach: percutaneous technique used      Patient position:  Flat    Ultrasound image availability:  Images available in PACS    Sterile ultrasound techniques: Sterile gel and sterile probe covers were used      Number of attempts:  1    Successful placement: yes      Landmarks identified: yes    Anesthesia (see MAR for exact dosages):     Anesthesia method:  None  Post-procedure details:     Post-procedure:  Dressing applied    Assessment:  Blood return through all ports and free fluid flow    Post-procedure complications: none      Patient tolerance of procedure:  Tolerated well, no immediate complications  Complex Venous Access Line    Date/Time: 5/30/2025 12:56 AM    Performed by: Oswaldo Kent DO  Authorized by: Oswaldo Kent DO    Patient  location:  ED  Other Assisting Provider: No    Consent:     Consent obtained:  Verbal    Consent given by:  Patient    Risks discussed:  Arterial puncture, bleeding, infection and incorrect placement    Alternatives discussed:  No treatment  Universal protocol:     Procedure explained and questions answered to patient or proxy's satisfaction: yes    Pre-procedure details:     Hand hygiene: Hand hygiene performed prior to insertion      Sterile barrier technique: All elements of maximal sterile technique followed      Skin preparation:  Alcohol    Skin preparation agent: Skin preparation agent completely dried prior to procedure    Procedure details:     Complex Venous Access Line Type: US Guided Peripheral IV      Peripheral IV Indications comment:  Difficulty obtaining peripheral venous access, patient pulled out prior IV    Orientation:  Left    Location:  Antecubital    Catheter size:  18 gauge    Patient evaluated for contraindications to access (i.e. fistula, thrombosis, etc): Yes      Approach: percutaneous technique used      Patient position:  Flat    Ultrasound image availability:  Images available in PACS    Sterile ultrasound techniques: Sterile gel and sterile probe covers were used      Number of attempts:  1    Successful placement: yes      Landmarks identified: yes    Anesthesia (see MAR for exact dosages):     Anesthesia method:  None  Post-procedure details:     Post-procedure:  Dressing applied    Assessment:  Blood return through all ports and free fluid flow    Post-procedure complications: none      Patient tolerance of procedure:  Tolerated well, no immediate complications      ED Medication and Procedure Management   Prior to Admission Medications   Prescriptions Last Dose Informant Patient Reported? Taking?   Continuous Glucose Sensor (Dexcom G7 Sensor)   No No   Sig: Use 1 Device every 10 days   Patient not taking: Reported on 5/29/2025   Empagliflozin (Jardiance) 25 MG TABS   Yes No   Sig:  Take 25 mg by mouth every morning *obtains from [facility] patient assitance program   Patient not taking: Reported on 5/29/2025   Insulin Glargine Solostar (Lantus SoloStar) 100 UNIT/ML SOPN   No No   Sig: Use 7u subcutaneous daily bedtime   Patient not taking: Reported on 5/29/2025   cholecalciferol (VITAMIN D3) 1,000 units tablet   No No   Sig: Take 2 tablets (2,000 Units total) by mouth daily   Patient not taking: Reported on 5/1/2025   cyanocobalamin (VITAMIN B-12) 1000 MCG tablet   No No   Sig: Take 1 tablet (1,000 mcg total) by mouth daily   desloratadine (CLARINEX) 5 MG tablet   No No   Sig: Take 1 tablet (5 mg total) by mouth daily for 14 days   diltiazem (CARDIZEM CD) 120 mg 24 hr capsule   No No   Sig: Take 1 capsule (120 mg total) by mouth daily   diltiazem (TIAZAC) 120 MG 24 hr capsule   Yes No   Sig: Take 1 capsule by mouth in the morning   Patient not taking: Reported on 5/29/2025   insulin aspart (NovoLOG FlexPen) 100 UNIT/ML injection pen   Yes No   Patient not taking: Reported on 5/29/2025   isosorbide mononitrate (IMDUR) 30 mg 24 hr tablet   No No   Sig: Take 1 tablet (30 mg total) by mouth daily   levothyroxine 75 mcg tablet   No No   Sig: Take 1 tablet (75 mcg total) by mouth daily   mirtazapine (REMERON) 15 mg tablet   No No   Sig: TAKE 1 TABLET (15 MG TOTAL) BY MOUTH DAILY AT BEDTIME DO NOT START BEFORE MAY 14, 2025.   mirtazapine (REMERON) 7.5 MG tablet   No No   Sig: Take 1 tablet (7.5 mg total) by mouth daily at bedtime for 14 days   pravastatin (PRAVACHOL) 40 mg tablet   No No   Sig: Take 1 tablet (40 mg total) by mouth daily with dinner   Patient not taking: Reported on 5/2/2025      Facility-Administered Medications: None     Patient's Medications   Discharge Prescriptions    No medications on file     No discharge procedures on file.  ED SEPSIS DOCUMENTATION   Time reflects when diagnosis was documented in both MDM as applicable and the Disposition within this note       Time User Action  Codes Description Comment    5/29/2025  9:33 PM Oswaldo Kent Add [R06.02] Shortness of breath     5/29/2025  9:34 PM Edilson Kentin Add [R94.31] T wave inversion in EKG     5/30/2025 12:55 AM KoEdilson michelein Add [I26.99] Pulmonary emboli (HCC)     5/30/2025 12:55 AM KoEdilson michelein Modify [R06.02] Shortness of breath     5/30/2025 12:55 AM Koryne Oswaldo Modify [I26.99] Pulmonary emboli (HCC)     5/30/2025  1:05 AM Koryne, Oswaldo Add [I26.99] Pulmonary embolism (HCC)     5/30/2025  1:05 AM Koryne, Oswaldo Remove [I26.99] Pulmonary embolism (HCC)     5/30/2025  1:05 AM Koryne, Oswaldo Add [I26.99] Pulmonary embolism (HCC)     5/30/2025  1:05 AM Koryne Oswaldo Modify [I26.99] Pulmonary emboli (HCC)     5/30/2025  1:05 AM Koryne, Oswaldo Modify [I26.99] Pulmonary embolism (HCC)     5/30/2025  1:05 AM Koryne Oswaldo Remove [I26.99] Pulmonary emboli (HCC)                      [1]   Past Medical History:  Diagnosis Date    Acute embolism and thrombosis of unspecified deep veins of unspecified lower extremity (HCC)     Last Assessed:  5/18/17    Anemia     Anosmia     Anxiety     Arthritis     Asthma     Back pain     Bilateral macular retinal edema     CAD (coronary artery disease)     Cataract     Cervical disc herniation     Cervical radiculopathy     Cervical spinal stenosis     Cervical spondylolysis     Chronic kidney disease     Chronic mastoiditis     Colon polyp     Complex endometrial hyperplasia     Depression     Diabetes mellitus (HCC)     Disease of thyroid gland     DVT (deep venous thrombosis) (HCC)     DVT (deep venous thrombosis) (HCC) 06/16/2017    Fibromyalgia     Fibromyalgia, primary     Hyperlipidemia     Hypertension     Hypothyroid     Kidney stone     Lumbar radiculopathy     Neck pain     Obese     PONV (postoperative nausea and vomiting)     Shingles 07/01/2021    Spinal stenosis     Stomach ulcer     Thyroid disease     Toxic metabolic encephalopathy 02/11/2025    Vascular claudication (HCC) 12/11/2017   [2]   Past  Surgical History:  Procedure Laterality Date    BACK SURGERY      CARPAL TUNNEL RELEASE      CATARACT EXTRACTION      CHOLECYSTECTOMY      COLONOSCOPY      CORONARY ANGIOPLASTY      CORONARY ARTERY BYPASS GRAFT      CYSTOSCOPY N/A 2017    Procedure: CYSTOSCOPY;  Surgeon: Tacho Arnold MD;  Location: [facility];  Service: Gynecology Oncology    CYSTOSCOPY      MA LAPS TOTAL HYSTERECT 250 GM/< W/RMVL TUBE/OVARY N/A 2017    Procedure: ROBOTIC HYSTERECTOMY; BILATERAL SALPINO-OOPHERECTOMY; umbilical hernia repair.;  Surgeon: Tacho Arnold MD;  Location: [facility];  Service: Gynecology Oncology    MA REPAIR FIRST ABDOMINAL WALL HERNIA N/A 2022    Procedure: REPAIR HERNIA INCISIONAL;  Surgeon: Horacio Scherer DO;  Location: [facility];  Service: General    TONSILECTOMY AND ADNOIDECTOMY      TONSILLECTOMY      UMBILICAL HERNIA REPAIR     [3]   Family History  Problem Relation Name Age of Onset    Arthritis Mother      Leukemia Mother      Other Mother          Anxiety, major depressive disorder, recurrent episode with atypical features    Coronary artery disease Father          Heart problem    Diabetes Father      Other Father          Infectious disease    Alzheimer's disease Maternal Grandmother      Other Maternal Grandfather          Heart problem    Other Daughter          Anxiety, major depressive disorder, recurrent episode with atypical features    Alcohol abuse Other          Grandparent    Cancer Family      Diabetes Family      Hypertension Family      Other Family          Reported prior back trouble, thyroid disorder   [4]   Social History  Tobacco Use    Smoking status: Former     Current packs/day: 0.00     Average packs/day: 1 pack/day for 6.0 years (6.0 ttl pk-yrs)     Types: Cigarettes     Start date:      Quit date:      Years since quittin.4     Passive exposure: Never    Smokeless tobacco: Never    Tobacco comments:     Smoked about a half a pack for about 4 yrs.   Quite about 50 yrs ago.   Vaping Use    Vaping status: Never Used   Substance Use Topics    Alcohol use: Never    Drug use: No        Oswaldo Kent DO  05/31/25 0005

## 2025-05-29 NOTE — PROGRESS NOTES
"Name: Yajaira Marsh      : 1945      MRN: [id number]  Encounter Provider: Spencer Woo MD  Encounter Date: 2025   Encounter department: [facility]  :  Assessment & Plan  Shortness of breath  EKG abnormality  NYHA IV dyspnea ongoing for about a month now  +orthopnea, +bendopnea  +JVD, 1+ pedal edema.  New T wave inversions noted in leads II, 3, aVF, anterior, lateral leads.  Patient mentions that she does not want to die but also did not want to go to the hospital in the first place.  It took extensive conversation and convincing her to go to the hospital.  Orders:    POCT ECG    Transfer to other facility           History of Present Illness   Patient presents to the clinic for shortness of breath that has been ongoing for about a month now.  She endorses exertional dyspnea, orthopnea, bendopnea.  She is also having exercise intolerance and feels short of breath on talking as well.  She also complained of palpitations, chest pressure.    Anxiety  Symptoms include chest pain, palpitations and shortness of breath.         Review of Systems   Constitutional:  Negative for chills and fever.   HENT:  Negative for ear pain and sore throat.    Eyes:  Negative for pain and visual disturbance.   Respiratory:  Positive for chest tightness and shortness of breath. Negative for cough.    Cardiovascular:  Positive for chest pain and palpitations.   Gastrointestinal:  Negative for abdominal pain and vomiting.   Genitourinary:  Negative for dysuria and hematuria.   Musculoskeletal:  Negative for arthralgias and back pain.   Skin:  Negative for color change and rash.   Neurological:  Negative for seizures and syncope.   All other systems reviewed and are negative.      Objective   /60 (BP Location: Left arm, Patient Position: Sitting, Cuff Size: Standard)   Pulse 94   Temp 97.8 °F (36.6 °C) (Tympanic)   Ht 5' 1\" (1.549 m)   Wt 73.6 kg (162 lb 3.2 oz)   SpO2 98%   BMI 30.65 " kg/m²      Physical Exam  Vitals and nursing note reviewed.   Constitutional:       General: She is not in acute distress.     Appearance: She is well-developed.   HENT:      Head: Normocephalic and atraumatic.     Eyes:      Conjunctiva/sclera: Conjunctivae normal.     Neck:      Comments: JVD  Cardiovascular:      Rate and Rhythm: Regular rhythm. Tachycardia present.      Heart sounds: No murmur heard.  Pulmonary:      Effort: Pulmonary effort is normal. No respiratory distress.      Breath sounds: Normal breath sounds. No wheezing or rales.   Abdominal:      Palpations: Abdomen is soft.      Tenderness: There is no abdominal tenderness.     Musculoskeletal:         General: No swelling.      Cervical back: Neck supple.      Right lower leg: Edema present.      Left lower leg: Edema present.     Skin:     General: Skin is warm and dry.      Capillary Refill: Capillary refill takes less than 2 seconds.     Neurological:      Mental Status: She is alert.     Psychiatric:         Mood and Affect: Mood normal.       Administrative Statements   I have spent a total time of 55 minutes in caring for this patient on the day of the visit/encounter including Diagnostic results, Prognosis, Risks and benefits of tx options, Instructions for management, Patient and family education, Importance of tx compliance, Risk factor reductions, Impressions, Counseling / Coordination of care, Documenting in the medical record, Reviewing/placing orders in the medical record (including tests, medications, and/or procedures), Obtaining or reviewing history  , and Communicating with other healthcare professionals .

## 2025-05-29 NOTE — TELEPHONE ENCOUNTER
Spoke to the patient, patient states that she is just waking up and can't collect all her thoughts but she is still complaining of weakness and some issues with swelling in her mouth.    I advised that the patient should be seen for an evaluation. Appt scheduled today with Dr. Woo at 4 pm. She states that she will have her  bring her to the office.

## 2025-05-29 NOTE — PROGRESS NOTES
Outpatient Care Management note- follow up call    Chart review completed    Last RN CM outreach on 5/15/25, briefly spoke with the patient but call was quickly disconnected by Yajaira as she reported she had an appt. Previous attempts have been difficult as the patient and spouse  and hang up the calls. OP SW CM referral made by this RN CM to follow up on financial and food insecurity but chart notes that outreach has been unsuccessful for them as well.     Call placed to the patient today and call was picked up and hung up. Will send an unable to reach letter via [employer].     Await patient response.

## 2025-05-30 ENCOUNTER — APPOINTMENT (INPATIENT)
Dept: NON INVASIVE DIAGNOSTICS | Facility: HOSPITAL | Age: 80
DRG: 176 | End: 2025-05-30
Payer: COMMERCIAL

## 2025-05-30 ENCOUNTER — APPOINTMENT (INPATIENT)
Dept: NON INVASIVE DIAGNOSTICS | Facility: HOSPITAL | Age: 80
DRG: 176 | End: 2025-05-30
Attending: INTERNAL MEDICINE
Payer: COMMERCIAL

## 2025-05-30 ENCOUNTER — PATIENT OUTREACH (OUTPATIENT)
Dept: CASE MANAGEMENT | Facility: OTHER | Age: 80
End: 2025-05-30

## 2025-05-30 PROBLEM — R94.31 ABNORMAL EKG: Status: ACTIVE | Noted: 2025-05-30

## 2025-05-30 PROBLEM — I26.99 PULMONARY EMBOLUS (HCC): Status: ACTIVE | Noted: 2025-05-30

## 2025-05-30 LAB
2HR DELTA HS TROPONIN: 6 NG/L
ANION GAP SERPL CALCULATED.3IONS-SCNC: 8 MMOL/L (ref 4–13)
APTT PPP: 21 SECONDS (ref 23–34)
APTT PPP: 53 SECONDS (ref 23–34)
APTT PPP: 62 SECONDS (ref 23–34)
APTT PPP: 87 SECONDS (ref 23–34)
ATRIAL RATE: 79 BPM
BUN SERPL-MCNC: 17 MG/DL (ref 5–25)
CALCIUM SERPL-MCNC: 9 MG/DL (ref 8.4–10.2)
CARDIAC TROPONIN I PNL SERPL HS: 20 NG/L (ref ?–50)
CHLORIDE SERPL-SCNC: 110 MMOL/L (ref 96–108)
CO2 SERPL-SCNC: 22 MMOL/L (ref 21–32)
CREAT SERPL-MCNC: 1.2 MG/DL (ref 0.6–1.3)
ERYTHROCYTE [DISTWIDTH] IN BLOOD BY AUTOMATED COUNT: 15.1 % (ref 11.6–15.1)
GFR SERPL CREATININE-BSD FRML MDRD: 43 ML/MIN/1.73SQ M
GLUCOSE SERPL-MCNC: 170 MG/DL (ref 65–140)
GLUCOSE SERPL-MCNC: 172 MG/DL (ref 65–140)
GLUCOSE SERPL-MCNC: 173 MG/DL (ref 65–140)
GLUCOSE SERPL-MCNC: 177 MG/DL (ref 65–140)
GLUCOSE SERPL-MCNC: 179 MG/DL (ref 65–140)
GLUCOSE SERPL-MCNC: 181 MG/DL (ref 65–140)
GLUCOSE SERPL-MCNC: 240 MG/DL (ref 65–140)
HCT VFR BLD AUTO: 33.7 % (ref 34.8–46.1)
HGB BLD-MCNC: 10.9 G/DL (ref 11.5–15.4)
MCH RBC QN AUTO: 28.2 PG (ref 26.8–34.3)
MCHC RBC AUTO-ENTMCNC: 32.3 G/DL (ref 31.4–37.4)
MCV RBC AUTO: 87 FL (ref 82–98)
P AXIS: 62 DEGREES
PLATELET # BLD AUTO: 190 THOUSANDS/UL (ref 149–390)
PMV BLD AUTO: 11.7 FL (ref 8.9–12.7)
POTASSIUM SERPL-SCNC: 3.7 MMOL/L (ref 3.5–5.3)
PR INTERVAL: 158 MS
QRS AXIS: -29 DEGREES
QRSD INTERVAL: 80 MS
QT INTERVAL: 394 MS
QTC INTERVAL: 452 MS
RBC # BLD AUTO: 3.87 MILLION/UL (ref 3.81–5.12)
SODIUM SERPL-SCNC: 140 MMOL/L (ref 135–147)
T WAVE AXIS: -38 DEGREES
VENTRICULAR RATE: 79 BPM
WBC # BLD AUTO: 8.58 THOUSAND/UL (ref 4.31–10.16)

## 2025-05-30 PROCEDURE — 84484 ASSAY OF TROPONIN QUANT: CPT

## 2025-05-30 PROCEDURE — 85730 THROMBOPLASTIN TIME PARTIAL: CPT | Performed by: INTERNAL MEDICINE

## 2025-05-30 PROCEDURE — 85730 THROMBOPLASTIN TIME PARTIAL: CPT

## 2025-05-30 PROCEDURE — 99223 1ST HOSP IP/OBS HIGH 75: CPT | Performed by: INTERNAL MEDICINE

## 2025-05-30 PROCEDURE — 82948 REAGENT STRIP/BLOOD GLUCOSE: CPT

## 2025-05-30 PROCEDURE — 93306 TTE W/DOPPLER COMPLETE: CPT | Performed by: INTERNAL MEDICINE

## 2025-05-30 PROCEDURE — 80048 BASIC METABOLIC PNL TOTAL CA: CPT | Performed by: INTERNAL MEDICINE

## 2025-05-30 PROCEDURE — 93306 TTE W/DOPPLER COMPLETE: CPT

## 2025-05-30 PROCEDURE — 99222 1ST HOSP IP/OBS MODERATE 55: CPT | Performed by: INTERNAL MEDICINE

## 2025-05-30 PROCEDURE — 93010 ELECTROCARDIOGRAM REPORT: CPT | Performed by: INTERNAL MEDICINE

## 2025-05-30 PROCEDURE — 36415 COLL VENOUS BLD VENIPUNCTURE: CPT

## 2025-05-30 PROCEDURE — 93970 EXTREMITY STUDY: CPT

## 2025-05-30 PROCEDURE — 85027 COMPLETE CBC AUTOMATED: CPT | Performed by: INTERNAL MEDICINE

## 2025-05-30 PROCEDURE — 93970 EXTREMITY STUDY: CPT | Performed by: STUDENT IN AN ORGANIZED HEALTH CARE EDUCATION/TRAINING PROGRAM

## 2025-05-30 RX ORDER — NYSTATIN 100000 [USP'U]/G
POWDER TOPICAL 2 TIMES DAILY
Status: DISCONTINUED | OUTPATIENT
Start: 2025-05-30 | End: 2025-06-02 | Stop reason: HOSPADM

## 2025-05-30 RX ORDER — INSULIN GLARGINE 100 [IU]/ML
25 INJECTION, SOLUTION SUBCUTANEOUS
Status: DISCONTINUED | OUTPATIENT
Start: 2025-05-30 | End: 2025-06-02 | Stop reason: HOSPADM

## 2025-05-30 RX ORDER — HYDROXYZINE HYDROCHLORIDE 25 MG/1
25 TABLET, FILM COATED ORAL EVERY 6 HOURS PRN
Status: DISCONTINUED | OUTPATIENT
Start: 2025-05-30 | End: 2025-06-02 | Stop reason: HOSPADM

## 2025-05-30 RX ORDER — MIRTAZAPINE 15 MG/1
15 TABLET, FILM COATED ORAL
Status: DISCONTINUED | OUTPATIENT
Start: 2025-05-30 | End: 2025-06-02 | Stop reason: HOSPADM

## 2025-05-30 RX ORDER — ISOSORBIDE MONONITRATE 30 MG/1
30 TABLET, EXTENDED RELEASE ORAL DAILY
Status: DISCONTINUED | OUTPATIENT
Start: 2025-05-30 | End: 2025-06-02 | Stop reason: HOSPADM

## 2025-05-30 RX ORDER — FLUTICASONE PROPIONATE 50 MCG
1 SPRAY, SUSPENSION (ML) NASAL DAILY
Status: DISCONTINUED | OUTPATIENT
Start: 2025-05-30 | End: 2025-06-02 | Stop reason: HOSPADM

## 2025-05-30 RX ORDER — ASPIRIN 81 MG/1
81 TABLET ORAL DAILY
Status: DISCONTINUED | OUTPATIENT
Start: 2025-05-30 | End: 2025-06-02 | Stop reason: HOSPADM

## 2025-05-30 RX ORDER — DILTIAZEM HYDROCHLORIDE 120 MG/1
120 CAPSULE, COATED, EXTENDED RELEASE ORAL DAILY
Status: DISCONTINUED | OUTPATIENT
Start: 2025-05-30 | End: 2025-06-02 | Stop reason: HOSPADM

## 2025-05-30 RX ORDER — CARVEDILOL 6.25 MG/1
6.25 TABLET ORAL 2 TIMES DAILY WITH MEALS
Status: DISCONTINUED | OUTPATIENT
Start: 2025-05-30 | End: 2025-06-02 | Stop reason: HOSPADM

## 2025-05-30 RX ORDER — HEPARIN SODIUM 10000 [USP'U]/100ML
3-30 INJECTION, SOLUTION INTRAVENOUS
Status: DISCONTINUED | OUTPATIENT
Start: 2025-05-30 | End: 2025-06-01

## 2025-05-30 RX ORDER — LEVOTHYROXINE SODIUM 75 UG/1
75 TABLET ORAL
Status: DISCONTINUED | OUTPATIENT
Start: 2025-05-30 | End: 2025-06-02 | Stop reason: HOSPADM

## 2025-05-30 RX ORDER — HEPARIN SODIUM 1000 [USP'U]/ML
5600 INJECTION, SOLUTION INTRAVENOUS; SUBCUTANEOUS EVERY 6 HOURS PRN
Status: DISCONTINUED | OUTPATIENT
Start: 2025-05-30 | End: 2025-06-01

## 2025-05-30 RX ORDER — INSULIN LISPRO 100 [IU]/ML
1-5 INJECTION, SOLUTION INTRAVENOUS; SUBCUTANEOUS
Status: DISCONTINUED | OUTPATIENT
Start: 2025-05-30 | End: 2025-06-02 | Stop reason: HOSPADM

## 2025-05-30 RX ORDER — INSULIN GLARGINE 100 [IU]/ML
15 INJECTION, SOLUTION SUBCUTANEOUS
Status: DISCONTINUED | OUTPATIENT
Start: 2025-05-30 | End: 2025-05-30

## 2025-05-30 RX ORDER — HEPARIN SODIUM 1000 [USP'U]/ML
5600 INJECTION, SOLUTION INTRAVENOUS; SUBCUTANEOUS ONCE
Status: COMPLETED | OUTPATIENT
Start: 2025-05-30 | End: 2025-05-30

## 2025-05-30 RX ORDER — ACETAMINOPHEN 325 MG/1
650 TABLET ORAL EVERY 4 HOURS PRN
Status: DISCONTINUED | OUTPATIENT
Start: 2025-05-30 | End: 2025-06-02 | Stop reason: HOSPADM

## 2025-05-30 RX ORDER — HEPARIN SODIUM 1000 [USP'U]/ML
2800 INJECTION, SOLUTION INTRAVENOUS; SUBCUTANEOUS EVERY 6 HOURS PRN
Status: DISCONTINUED | OUTPATIENT
Start: 2025-05-30 | End: 2025-06-01

## 2025-05-30 RX ORDER — ONDANSETRON 2 MG/ML
4 INJECTION INTRAMUSCULAR; INTRAVENOUS EVERY 6 HOURS PRN
Status: DISCONTINUED | OUTPATIENT
Start: 2025-05-30 | End: 2025-06-02 | Stop reason: HOSPADM

## 2025-05-30 RX ADMIN — HEPARIN SODIUM 5600 UNITS: 1000 INJECTION, SOLUTION INTRAVENOUS; SUBCUTANEOUS at 01:26

## 2025-05-30 RX ADMIN — HYDROXYZINE HYDROCHLORIDE 25 MG: 25 TABLET, FILM COATED ORAL at 09:17

## 2025-05-30 RX ADMIN — CYANOCOBALAMIN TAB 500 MCG 1000 MCG: 500 TAB at 09:22

## 2025-05-30 RX ADMIN — HEPARIN SODIUM 18 UNITS/KG/HR: 10000 INJECTION, SOLUTION INTRAVENOUS at 01:28

## 2025-05-30 RX ADMIN — INSULIN LISPRO 1 UNITS: 100 INJECTION, SOLUTION INTRAVENOUS; SUBCUTANEOUS at 09:17

## 2025-05-30 RX ADMIN — CARVEDILOL 6.25 MG: 6.25 TABLET, FILM COATED ORAL at 17:54

## 2025-05-30 RX ADMIN — NYSTATIN: 100000 POWDER TOPICAL at 17:55

## 2025-05-30 RX ADMIN — DILTIAZEM HYDROCHLORIDE 120 MG: 120 CAPSULE, COATED, EXTENDED RELEASE ORAL at 09:17

## 2025-05-30 RX ADMIN — INSULIN LISPRO 1 UNITS: 100 INJECTION, SOLUTION INTRAVENOUS; SUBCUTANEOUS at 17:54

## 2025-05-30 RX ADMIN — INSULIN LISPRO 1 UNITS: 100 INJECTION, SOLUTION INTRAVENOUS; SUBCUTANEOUS at 01:57

## 2025-05-30 RX ADMIN — HEPARIN SODIUM 18 UNITS/KG/HR: 10000 INJECTION, SOLUTION INTRAVENOUS at 21:34

## 2025-05-30 RX ADMIN — INSULIN LISPRO 2 UNITS: 100 INJECTION, SOLUTION INTRAVENOUS; SUBCUTANEOUS at 21:22

## 2025-05-30 RX ADMIN — INSULIN LISPRO 1 UNITS: 100 INJECTION, SOLUTION INTRAVENOUS; SUBCUTANEOUS at 12:33

## 2025-05-30 RX ADMIN — HEPARIN SODIUM 2800 UNITS: 1000 INJECTION INTRAVENOUS; SUBCUTANEOUS at 23:04

## 2025-05-30 RX ADMIN — LEVOTHYROXINE SODIUM 75 MCG: 0.07 TABLET ORAL at 06:37

## 2025-05-30 RX ADMIN — ISOSORBIDE MONONITRATE 30 MG: 30 TABLET, EXTENDED RELEASE ORAL at 09:17

## 2025-05-30 RX ADMIN — ASPIRIN 81 MG: 81 TABLET, COATED ORAL at 09:17

## 2025-05-30 NOTE — H&P
"H&P - Hospitalist   Name: Yajaira Marsh 79 y.o. female I MRN: [id number]  Unit/Bed#: ED 29 I Date of Admission: 5/29/2025   Date of Service: 5/30/2025 I Hospital Day: 0     Assessment & Plan  Pulmonary embolus (HCC)  Patient presenting at the advice of her PCP with increasing dyspnea on exertion and orthopnea, also noted with leg swelling and abnormal EKG  D-dimer noted elevated during ED evaluation, subsequent CTA PE per \"V rad\" overnight read concerning for multiple segmental pulmonary emboli about the left lower lobe pulmonary arterial tree with evidence of right heart strain on CT.  Patient does report history of prior DVT for which she had completed anticoagulant course  Patient otherwise chemically stable and maintaining appropriate oxygenation on room air while at rest, troponin x 2 WNL  Started on heparin gtt. VTE protocol during ED evaluation, continue  Continue telemetry monitoring  Check bilateral lower extremity venous duplex, TTE  Eventually will need anticoagulant price check.  Anticipate need for indefinite course given prior history of DVT  Abnormal EKG  EKG on admission demonstrated new T wave versions in numerous leads of uncertain significance.  Patient without complaints of chest pain at this time and again with troponin x 2 WNL however with new complaints of dyspnea on exertion.  Unclear EKG changes all from pulmonary embolism or if there is other cardiac process ongoing  TTE to be obtained as above, appreciate cardiology insight.  Continue telemetry monitoring  CAD (coronary artery disease)  S/p prior CABG x 4  Continue ASA, Imdur, statin therapy.  Unclear why patient is not on a beta-blocker.  Type 2 diabetes mellitus with hyperglycemia, with long-term current use of insulin ([facility])  Lab Results   Component Value Date    HGBA1C 11.1 (H) 03/07/2025       Recent Labs     05/30/25  0156   POCGLU 170*       Blood Sugar Average: Last 72 hrs:  (P) 170  Patient follows with [facility] " endocrinology.  Diabetes uncontrolled as evidenced by A1c, patient reports both hyper and hypoglycemia  Patient states usually takes Toujeo 50 units nightly and sliding scale insulin with meals although admits she lowers doses depend on how much she eats but cannot clarify how much she reduces her insulin regimen by and admits to inconsistent use  Given inconsistencies we will manage for now with Lantus 25 units nightly, add correctional insulin coverage with Accu-Cheks.  Adjust inpatient insulin regimen as needed  Carb controlled diet  Initiate hypoglycemia protocol  Hypothyroidism  Continue PTA levothyroxine  MADAN (generalized anxiety disorder)  Additionally with history of depression, appears somewhat anxious on examination  Continue PTA mirtazapine, needs ongoing follow-up with psychiatry      VTE Prophylaxis: Heparin Drip  / sequential compression device   Code Status: Level 1 - Full Code   Discussion with family: Offered however patient declines at this time    Anticipated Length of Stay:  Patient will be admitted on an Inpatient basis with an anticipated length of stay of greater than 2 midnights.   Justification for Hospital Stay: Segmental pulmonary embolism with CT evidence of right heart strain additionally with abnormal EKG requiring IV heparin, echocardiogram, telemetry monitoring, clinical monitoring.  Additionally will require cardiology consultation for abnormal EKG.    Chief Complaint:     Dyspnea with exertion  History of Present Illness:  Yajaira Marsh is a 79 y.o. female who has a past medical history significant for CAD s/p CABG x 4, history of DVT who had previously completed anticoagulant therapy, uncontrolled type 2 diabetes on insulin therapy, hypothyroidism, hypertension, hyperlipidemia, anxiety/depression who presented at the advice of her PCP with worsening dyspnea with exertion and orthopnea.  She states this has been ongoing for at least the past 1 month and somewhat progressive  during this time, associated with orthopnea and increased exercise intolerance.  She additionally noted intermittent episodes of palpitations and chest pressure.  She does report she is increasingly sedentary due to the degree of her symptoms as well as generalized fatigue.  She denied any fever/chills, known sick contacts, apparent weight gain, abdominal pain/nausea/vomiting/diarrhea, or other systemic symptoms.  On the day of presentation she had a follow-up appointment with her PCP, and given concerning symptoms she underwent an EKG revealing new T wave inversions in the inferolateral leads and given overall presentation was referred to the ED for further evaluation.  During ED evaluation she remained hemodynamically stable, with labs significant for an elevated D-dimer for which a CTA PE study was pursued concerning for segmental pulmonary embolism in the left lower lobe pulmonary arterial tree with radiographic concern for right heart strain additionally with 7 mm right middle lobe pulmonary nodule redemonstrated.  She was initiated on heparin gtt. and is admitted for further management of the newly discovered pulmonary embolism additionally for further evaluation of the apparent new EKG changes.    Review of Systems:    Constitutional:  Denies fever or chills   Eyes:  Denies change in visual acuity   HENT:  Denies nasal congestion or sore throat   Respiratory:  Denies cough but reported dyspnea with exertion  Cardiovascular:  Denies chest pain or edema   GI:  Denies abdominal pain, nausea, vomiting, bloody stools or diarrhea   :  Denies dysuria   Musculoskeletal:  Denies back pain or joint pain   Integument:  Denies rash   Neurologic:  Denies headache, focal weakness or sensory changes   Endocrine:  Denies polyuria or polydipsia   Lymphatic:  Denies swollen glands   Psychiatric:  Denies depression or anxiety     Past Medical and Surgical History:   Past Medical History[1]  Past Surgical  History[2]    Meds/Allergies:  Current Outpatient Medications   Medication Instructions    cholecalciferol (VITAMIN D3) 2,000 Units, Oral, Daily    Continuous Glucose Sensor (Dexcom G7 Sensor) 1 Device, Does not apply, Every 10 days    cyanocobalamin (VITAMIN B-12) 1,000 mcg, Oral, Daily    desloratadine (CLARINEX) 5 mg, Oral, Daily    diltiazem (CARDIZEM CD) 120 mg, Oral, Daily    diltiazem (TIAZAC) 120 MG 24 hr capsule 1 capsule, Daily    Empagliflozin (JARDIANCE) 25 mg, Every morning    insulin aspart (NovoLOG FlexPen) 100 UNIT/ML injection pen     Insulin Glargine Solostar (Lantus SoloStar) 100 UNIT/ML SOPN Use 7u subcutaneous daily bedtime    isosorbide mononitrate (IMDUR) 30 mg, Oral, Daily    levothyroxine 75 mcg, Oral, Daily    mirtazapine (REMERON) 7.5 mg, Oral, Daily at bedtime    mirtazapine (REMERON) 15 mg, Oral, Daily at bedtime    pravastatin (PRAVACHOL) 40 mg, Oral, Daily with dinner         Allergies: Allergies[3]  History:  Marital Status: /Civil Union     Substance Use History:   Social History     Substance and Sexual Activity   Alcohol Use Never     Tobacco Use History[4]  Social History     Substance and Sexual Activity   Drug Use No       Family History:  Family History[5]    Physical Exam:     Vitals:   Blood Pressure: (!) 194/91 (05/30/25 0400)  Pulse: 88 (05/30/25 0400)  Temperature: 98.5 °F (36.9 °C) (05/29/25 1747)  Temp Source: Temporal (05/29/25 1747)  Respirations: (!) 31 (05/30/25 0400)  SpO2: 95 % (05/30/25 0400)    Constitutional:  Well developed, well nourished, no acute distress, non-toxic appearance   Eyes:  PERRL, conjunctiva normal   HENT:  Atraumatic, external ears normal, nose normal, oropharynx moist, no pharyngeal exudates. Neck- normal range of motion, no tenderness, supple   Respiratory:  No respiratory distress, normal breath sounds, no rales, no wheezing   Cardiovascular:  Normal rate, normal rhythm, no murmurs, no gallops, no rubs   GI:  Soft, nondistended,  normal bowel sounds, nontender, no organomegaly, no mass, no rebound, no guarding   :  No costovertebral angle tenderness   Musculoskeletal:  No edema, no tenderness, no deformities. Back- no tenderness  Integument:  Well hydrated, no rash   Lymphatic:  No lymphadenopathy noted   Neurologic:  Alert &awake, communicative, CN 2-12 normal, normal motor function, normal sensory function, no focal deficits noted   Psychiatric:  Speech and behavior appropriate       Lab Results: I have reviewed all lab data below:    Results from last 7 days   Lab Units 05/29/25 2019   WBC Thousand/uL 9.43   HEMOGLOBIN g/dL 11.7   HEMATOCRIT % 35.8   PLATELETS Thousands/uL 212   SEGS PCT % 66   LYMPHO PCT % 27   MONO PCT % 7   EOS PCT % 0     Results from last 7 days   Lab Units 05/29/25 2019   SODIUM mmol/L 140   POTASSIUM mmol/L 4.0   CHLORIDE mmol/L 109*   CO2 mmol/L 22   BUN mg/dL 20   CREATININE mg/dL 1.18   ANION GAP mmol/L 9   CALCIUM mg/dL 9.3   ALBUMIN g/dL 3.6   TOTAL BILIRUBIN mg/dL 0.49   ALK PHOS U/L 84   ALT U/L 12   AST U/L 18   GLUCOSE RANDOM mg/dL 132     Results from last 7 days   Lab Units 05/29/25 2019   INR  1.01     Results from last 7 days   Lab Units 05/30/25  0156   POC GLUCOSE mg/dl 170*                 EKG: Personally reviewed, normal sinus rhythm HR 79, T wave abnormalities in inferolateral leads    Imaging: Results Review Statement: I reviewed radiology reports from this admission including: CTA chest PE study and chest xray.    XR chest 2 views  Result Date: 5/29/2025  Narrative: XR CHEST PA AND LATERAL INDICATION: SOB. COMPARISON: Chest radiograph April 23, 2025 FINDINGS: Clear lungs. No pneumothorax or pleural effusion. Normal sternotomy wires. Unchanged enlargement of the cardiomediastinal silhouette. Bones are unremarkable for age. Normal upper abdomen.     Impression: No acute cardiopulmonary disease. Workstation performed: [id number]     CTA PE study   Result Date 5/29/2025, per overnight Vrad  report:  Pulmonary arteries: There is adequate opacification of the pulmonary arterial structures with filling  defects seen within the segmental branches of the left lower lobe pulmonary artery. The main  pulmonary artery is not dilated, measuring 21 mm in diameter.  Aorta: Scattered atherosclerotic calcification is seen throughout the thoracic aorta and its branches.  No aneurysm.  Thyroid: Thyroid is unremarkable.  Trachea: Trachea and central airways are clear.  Lungs: The lungs are hypoinflated with basilar atelectasis but no focal consolidation. A 7 mm solid  pulmonary nodule is seen within the right middle lobe.  Pleural spaces: No pneumothorax. No pleural effusion.  Heart: Postsurgical change consistent with prior coronary artery bypass grafting is seen. Residual  coronary artery calcification is seen. Mitral and aortic annular calcification is seen as well. RV/LV ratio  is 1.1 with slight leftward bowing of the intraventricular septum. No reflux of contrast material into the  infrahepatic IVC.  Esophagus: Esophagus is unremarkable as visualized.  Lymph nodes: No suspicious lymphadenopathy.  Diaphragm: Unremarkable as visualized.  Liver: The visualized liver is unremarkable.The spleen is unremarkable.  Pancreas: The visualized pancreas is unremarkable.  Adrenal glands: The adrenal glands are unremarkable.  Kidneys: Incompletely visualized fluid attenuating lesion is seen in the posterior interpolar region of  the left kidney, likely representing a cyst. Visualized kidneys are otherwise unremarkable.  Bones/joints: Scattered degenerative change is seen throughout the axial and appendicular skeleton  without suspicious lytic or blastic osseous lesion. This includes fairly advanced degenerative disc  disease and sequela of diffuse idiopathic skeletal hyperostosis throughout the thoracic spine with  components of osteophytosis extending into and stenosing the spinal canal. No acute fracture or  dislocation.  Median sternotomy wires appear intact.  Soft tissues: Body wall soft tissues are unremarkable.  IMPRESSION:  1. Multiple segmental pulmonary emboli about the left lower lobe pulmonary arterial tree with CT  evidence of right heart strain.  2. Single right middle lobe solid pulmonary nodule. For patients at low risk (minimal or absent history  of smoking and of other known risk factors), recommend CT Chest at 6-12 months, then consider CT  Chest at 18-24 months. For patients at high risk (history of smoking or of other known risk factors),  recommend CT Chest at 6-12 months, then CT Chest at 18-24 months.  3. Multilevel degenerative disc disease and DISH seen throughout the thoracic spine with  components of spinal canal stenosis.          ** Please Note: Dragon 360 Dictation voice to text software was used in the creation of this document. **         [1]   Past Medical History:  Diagnosis Date    Acute embolism and thrombosis of unspecified deep veins of unspecified lower extremity (HCC)     Last Assessed:  5/18/17    Anemia     Anosmia     Anxiety     Arthritis     Asthma     Back pain     Bilateral macular retinal edema     CAD (coronary artery disease)     Cataract     Cervical disc herniation     Cervical radiculopathy     Cervical spinal stenosis     Cervical spondylolysis     Chronic kidney disease     Chronic mastoiditis     Colon polyp     Complex endometrial hyperplasia     Depression     Diabetes mellitus (HCC)     Disease of thyroid gland     DVT (deep venous thrombosis) (HCC)     DVT (deep venous thrombosis) (HCC) 06/16/2017    Fibromyalgia     Fibromyalgia, primary     Hyperlipidemia     Hypertension     Hypothyroid     Kidney stone     Lumbar radiculopathy     Neck pain     Obese     PONV (postoperative nausea and vomiting)     Shingles 07/01/2021    Spinal stenosis     Stomach ulcer     Thyroid disease     Toxic metabolic encephalopathy 02/11/2025    Vascular claudication (HCC) 12/11/2017   [2]   Past  Surgical History:  Procedure Laterality Date    BACK SURGERY      CARPAL TUNNEL RELEASE      CATARACT EXTRACTION      CHOLECYSTECTOMY      COLONOSCOPY      CORONARY ANGIOPLASTY      CORONARY ARTERY BYPASS GRAFT      CYSTOSCOPY N/A 2017    Procedure: CYSTOSCOPY;  Surgeon: Tacho Arnold MD;  Location: [facility];  Service: Gynecology Oncology    CYSTOSCOPY      GA LAPS TOTAL HYSTERECT 250 GM/< W/RMVL TUBE/OVARY N/A 2017    Procedure: ROBOTIC HYSTERECTOMY; BILATERAL SALPINO-OOPHERECTOMY; umbilical hernia repair.;  Surgeon: Tacho Arnold MD;  Location: [facility];  Service: Gynecology Oncology    GA REPAIR FIRST ABDOMINAL WALL HERNIA N/A 2022    Procedure: REPAIR HERNIA INCISIONAL;  Surgeon: Horacio Scherer DO;  Location: [facility];  Service: General    TONSILECTOMY AND ADNOIDECTOMY      TONSILLECTOMY      UMBILICAL HERNIA REPAIR     [3]   Allergies  Allergen Reactions    Bee Pollen Other (See Comments)    Cat Dander Other (See Comments)    Dog Epithelium (Canis Lupus Familiaris) Other (See Comments)    Molds & Smuts Allergic Rhinitis    Other Allergic Rhinitis     RAGWEED, CAT DANDER, DOG DANDER     Short Ragweed Pollen Ext Other (See Comments)    Pollen Extract Other (See Comments)     Cold symptoms     [4]   Social History  Tobacco Use   Smoking Status Former    Current packs/day: 0.00    Average packs/day: 1 pack/day for 6.0 years (6.0 ttl pk-yrs)    Types: Cigarettes    Start date:     Quit date:     Years since quittin.4    Passive exposure: Never   Smokeless Tobacco Never   Tobacco Comments    Smoked about a half a pack for about 4 yrs.  Quite about 50 yrs ago.   [5]   Family History  Problem Relation Name Age of Onset    Arthritis Mother      Leukemia Mother      Other Mother          Anxiety, major depressive disorder, recurrent episode with atypical features    Coronary artery disease Father          Heart problem    Diabetes Father      Other Father          Infectious  disease    Alzheimer's disease Maternal Grandmother      Other Maternal Grandfather          Heart problem    Other Daughter          Anxiety, major depressive disorder, recurrent episode with atypical features    Alcohol abuse Other          Grandparent    Cancer Family      Diabetes Family      Hypertension Family      Other Family          Reported prior back trouble, thyroid disorder

## 2025-05-30 NOTE — CASE MANAGEMENT
Case Management Assessment & Discharge Planning Note    Patient name Yajaira Marsh  Location [facility] 820/[facility] 820-01 MRN [id number]  : 1945 Date 2025       Current Admission Date: 2025  Current Admission Diagnosis:Pulmonary embolus (HCC)   Patient Active Problem List    Diagnosis Date Noted    Pulmonary embolus (HCC) 2025    Abnormal EKG 2025    Generalized anxiety disorder 05/15/2025    Rhinorrhea 2025    Dry mouth 2025    MCI (mild cognitive impairment) 2025    Hypokalemia 2025    Hypoglycemia 2025    Ambulatory dysfunction 2025    Essential hypertension 2025    Depressive disorder 2025    Current mild episode of major depressive disorder without prior episode ([facility]) 2024    Diarrhea 10/17/2024    Encounter for screening involving social determinants of health (SDoH) 2024    Obesity (BMI 30-39.9) 10/13/2023    Kidney stone 2023    Atherosclerosis of both carotid arteries 07/10/2023    Renal insufficiency 07/10/2023    Kidney mass 2023    Fall 2023    Anxiety 2022    Osteoarthritis of spine with radiculopathy, lumbar region 2022    Lumbar radiculopathy 2022    Microalbuminuria 2021    S/P CABG (coronary artery bypass graft) 2021    Apraxia 2021    Herpes zoster without complication 2021    MADAN (generalized anxiety disorder) 2020    SARAY (obstructive sleep apnea) 2018    Allergic rhinitis 2018    Onychomycosis 10/27/2017    Lung nodule 2017    Severe episode of recurrent major depressive disorder, without psychotic features ([facility]) 2017    History of DVT (deep vein thrombosis) 2017    CAD s/p CABG 2017    Hyperlipidemia 2017    Hypothyroidism 2017    Spinal stenosis of lumbar region 2017    Vitamin B12 deficiency 2016    Diabetic polyneuropathy associated with type 2 diabetes mellitus (HCC) 10/07/2015     Vitreo-retinal adhesions 04/21/2015    Adenomatous polyp of colon 11/30/2013    Psoriasis with arthropathy (HCC) 11/05/2013    Chronic mastoiditis 06/06/2013    Vitamin D deficiency 08/31/2012    Type 2 diabetes mellitus with hyperglycemia, with long-term current use of insulin (HCC) 12/30/2010      LOS (days): 0  Geometric Mean LOS (GMLOS) (days): 2.4  Days to GMLOS:1.9     OBJECTIVE:    Risk of Unplanned Readmission Score: 30.37         Current admission status: Inpatient       Preferred Pharmacy:   [facility]/pharmacy #[id number] - [place], [place] - [address]  [address] [facility] [address]  Phone: [phone] Fax: [phone]    Primary Care Provider: Sukumar Clemens DO    Primary Insurance: [employer] REP  Secondary Insurance:     ASSESSMENT:  Active Health Care Proxies       dee murphyseverino Turner Alternate Health Care Agent - Daughter   Primary Phone: [phone] (Mobile)                           Readmission Root Cause  30 Day Readmission: No    Patient Information  Admitted from:: Home  Mental Status: Alert  During Assessment patient was accompanied by: Daughter  Assessment information provided by:: Patient  Primary Caregiver: Self  Support Systems: Self, Spouse/significant other, Family members, Children  County of Residence: [place]  What city do you live in?: [place]  Home entry access options. Select all that apply.: No steps to enter home  Type of Current Residence: [facility]  Living Arrangements: Lives w/ Spouse/significant other  Is patient a ?: No    Activities of Daily Living Prior to Admission  Functional Status: Independent  Completes ADLs independently?: Yes  Ambulates independently?: Yes  Does patient use assisted devices?: No  Does patient currently own DME?: No  Does patient have a history of Outpatient Therapy (PT/OT)?: Yes  Does the patient have a history of Short-Term Rehab?: Yes  Does patient have a history of HHC?: No  Does patient currently have HHC?: No         Patient  Information Continued  Income Source: Pension/senior care  Does patient have prescription coverage?: Yes  Can the patient afford their medications and any related supplies (such as glucometers or test strips)?: Yes  Does patient receive dialysis treatments?: No  Does patient have a history of substance abuse?: No  Does patient have a history of Mental Health Diagnosis?: Yes (Major Depressive Disorder)  Is patient receiving treatment for mental health?: Yes (followed by  Psych- therapy and med management)  Has patient received inpatient treatment related to mental health in the last 2 years?: Yes (March 2025- [facility])         Means of Transportation  Means of Transport to Appts:: Family transport          DISCHARGE DETAILS:    Discharge planning discussed with:: patient and daughter at bedside  Freedom of Choice: Yes     CM contacted family/caregiver?: No- see comments (pt AAOx3, daughter at bedside)  Were Treatment Team discharge recommendations reviewed with patient/caregiver?: Yes  Did patient/caregiver verbalize understanding of patient care needs?: Yes  Were patient/caregiver advised of the risks associated with not following Treatment Team discharge recommendations?: Yes    Contacts  Patient Contacts: daughter- Kathryn  Relationship to Patient:: Family  Contact Method: Phone  Phone Number: [phone]  Reason/Outcome: Continuity of Care, Emergency Contact, Discharge Planning               CM introduced self and role to patient and daughter at bedside  Pt resides in [place] 55+ older community with spouse  Does not use DME at baseline, independent with ADLS  Hx of STR and OP PT  Dx with MDD- follows with  Psychiatry for therapy and med management, last [facility] admission March 2025 at [facility]   provides all transport  CM following

## 2025-05-30 NOTE — ASSESSMENT & PLAN NOTE
EKG on admission demonstrated new T wave versions in numerous leads of uncertain significance.  Patient without complaints of chest pain at this time and again with troponin x 2 WNL however with new complaints of dyspnea on exertion.  Unclear EKG changes all from pulmonary embolism or if there is other cardiac process ongoing  TTE to be obtained as above, appreciate cardiology insight.  Continue telemetry monitoring

## 2025-05-30 NOTE — ASSESSMENT & PLAN NOTE
Hypertensive, last /79  Home Rx: Supposed to be diltiazem 120 mg daily but reportedly not taking.  On Imdur 30 mg daily  She is a history of sinus bradycardia but heart rates 70s-90s here

## 2025-05-30 NOTE — QUICK NOTE
S: Seen and examined.  No acute events overnight.  Patient reports history of PE in the past stating that this was after a recent surgical procedure.  She states that she has been in the hospital for most of March, and since being home has been laying around and has been more sedentary.  She reports shortness of breath over the last couple of weeks.  Additionally admits to not taking her medications for a couple of weeks.  She denies chest pain, nausea, vomiting, diarrhea, fevers, chills, weakness, numbness or tingling of extremities, vision or hearing changes.  O  General: No acute distress  CV: regular rate, no MRG  Pulm: CTA bilaterally, no W/R/R  Abd: soft, nontender, non distended   Lower extremities: bilateral lower extremity edema, no pitting appreciated. No erythema or pain  A/P  Pulmonary embolism  CT PE study: Left lower lobe segmental and subsegmental PE. No secondary evidence of R heart strain   F/u ECHO  Continue on tele  Continue IV heparin. Eliquis price checked for $0.00. Plan for transition to [facility] on discharge   DVT  Venous duplex pending finalized read - per preliminary report acute non occlusive thrombus in left external iliac vein and left common femoral vein   Abnormal EKG  EKG reviewed   Cardiology consulted, appreciate recs  F/u ECHO  Anxiety  Atarax q6 PRN  Anemia   Hgb 11.7 on arrival --> 10.9 on am labs   No evidence of bleeding  Continue to monitor on cbc daily     Please see full H&P from 5/30 @ 3:30am for full plan    updated with plan via phone

## 2025-05-30 NOTE — ASSESSMENT & PLAN NOTE
CABG x 2 in 2000 with LIMA to LAD and SVG to OM  Vein graft to OM occluded about a year later requiring PCI of left main and OM 3  Stress test in 2022 without ischemia  Not taking aspirin or statin, on Imdur 30 mg daily   Patient informed and would like to come in to see Meño for medication management.

## 2025-05-30 NOTE — ED ATTENDING ATTESTATION
5/29/2025  I, Perry Pineda MD, saw and evaluated the patient. I have discussed the patient with the resident/non-physician practitioner and agree with the resident's/non-physician practitioner's findings, Plan of Care, and MDM as documented in the resident's/non-physician practitioner's note, except where noted. All available labs and Radiology studies were reviewed.  I was present for key portions of any procedure(s) performed by the resident/non-physician practitioner and I was immediately available to provide assistance.       At this point I agree with the current assessment done in the Emergency Department.  I have conducted an independent evaluation of this patient a history and physical is as follows:    ED Course     Impression: Shortness of breath, history of DVT, history of CAD  Differential diagnosis: ACS MI arrhythmia, pulmonary embolism, heart failure, pulmonary edema, symptomatic anemia    Plan to check screening labs, chest x-ray D-dimer ECG anticipate admission    ECG independently interpreted by me normal sinus rhythm rate 79 T wave abnormalities in lateral leads as well as lead 3 and aVF impression abnormal ECG lateral T wave appears to be new      Chest x-ray independently interpreted by me: No acute cardiopulmonary abnormality.  Sternotomy wires present.    Labs reviewed: CBC unremarkable.  CMP remarkable for elevated chloride.  Initial troponin 14 with delta D-dimer elevated at 2.51 will pursue CT PE study    Signed out to night team pending CT PE study.    Critical Care Time  Procedures

## 2025-05-30 NOTE — ASSESSMENT & PLAN NOTE
CTA chest showed left lower lobe segmental and subsegmental PE without evidence of right heart strain  On IV heparin per primary team

## 2025-05-30 NOTE — CASE MANAGEMENT
Case Management Discharge Planning Note    Patient name Yajaira Marsh  Location [facility] 820/[facility] 820-01 MRN [id number]  : 1945 Date 2025       Current Admission Date: 2025  Current Admission Diagnosis:Pulmonary embolus (HCC)   Patient Active Problem List    Diagnosis Date Noted    Pulmonary embolus (HCC) 2025    Abnormal EKG 2025    Generalized anxiety disorder 05/15/2025    Rhinorrhea 2025    Dry mouth 2025    MCI (mild cognitive impairment) 2025    Hypokalemia 2025    Hypoglycemia 2025    Ambulatory dysfunction 2025    Essential hypertension 2025    Depressive disorder 2025    Current mild episode of major depressive disorder without prior episode (HCC) 2024    Diarrhea 10/17/2024    Encounter for screening involving social determinants of health (SDoH) 2024    Obesity (BMI 30-39.9) 10/13/2023    Kidney stone 2023    Atherosclerosis of both carotid arteries 07/10/2023    Renal insufficiency 07/10/2023    Kidney mass 2023    Fall 2023    Anxiety 2022    Osteoarthritis of spine with radiculopathy, lumbar region 2022    Lumbar radiculopathy 2022    Microalbuminuria 2021    S/P CABG (coronary artery bypass graft) 2021    Apraxia 2021    Herpes zoster without complication 2021    MADAN (generalized anxiety disorder) 2020    SARAY (obstructive sleep apnea) 2018    Allergic rhinitis 2018    Onychomycosis 10/27/2017    Lung nodule 2017    Severe episode of recurrent major depressive disorder, without psychotic features ([facility]) 2017    History of DVT (deep vein thrombosis) 2017    CAD s/p CABG 2017    Hyperlipidemia 2017    Hypothyroidism 2017    Spinal stenosis of lumbar region 2017    Vitamin B12 deficiency 2016    Diabetic polyneuropathy associated with type 2 diabetes mellitus (HCC) 10/07/2015     Vitreo-retinal adhesions 04/21/2015    Adenomatous polyp of colon 11/30/2013    Psoriasis with arthropathy (HCC) 11/05/2013    Chronic mastoiditis 06/06/2013    Vitamin D deficiency 08/31/2012    Type 2 diabetes mellitus with hyperglycemia, with long-term current use of insulin (HCC) 12/30/2010      LOS (days): 0  Geometric Mean LOS (GMLOS) (days): 2.4  Days to GMLOS:2     OBJECTIVE:  Risk of Unplanned Readmission Score: 30.37         Current admission status: Inpatient   Preferred Pharmacy:   [facility]/pharmacy #[id number] - [place], [place] - [address] [address]  Phone: [phone] Fax: [phone]    Primary Care Provider: Sukumar Clemens DO    Primary Insurance: [employer] RE[employer]  Secondary Insurance:     DISCHARGE DETAILS:       Other Referral/Resources/Interventions Provided:  Interventions: Prescription Price Check  Referral Comments: CM completed price check for Eliquis with [facility].  CM was told pt has no cost for this medication.

## 2025-05-30 NOTE — CONSULTS
Consultation - Cardiology Team One  Yajaira Marsh 79 y.o. female MRN: [id number]  Unit/Bed#: ED 29 Encounter: [id number]    Inpatient consult to Cardiology  Consult performed by: ALAN Thornton  Consult ordered by: Rob Ralph DO      Physician Requesting Consult: Marlin Barboza MD  Reason for Consult / Principal Problem: ECG changes    Assessment & Plan  Abnormal EKG  ECG reviewed showing normal sinus rhythm with inferolateral T wave inversions  Unable to view prior ECGs at cardiology office but reportedly has nonspecific ST/T wave changes at baseline  High-sensitivity troponin negative  Denies chest pain  TTE pending  CAD s/p CABG  CABG x 2 in 2000 with LIMA to LAD and SVG to OM  Vein graft to OM occluded about a year later requiring PCI of left main and OM 3  Stress test in 2022 without ischemia  Not taking aspirin or statin, on Imdur 30 mg daily  Pulmonary embolus (HCC)  CTA chest showed left lower lobe segmental and subsegmental PE without evidence of right heart strain  On IV heparin per primary team  Essential hypertension  Hypertensive, last /79  Home Rx: Supposed to be diltiazem 120 mg daily but reportedly not taking.  On Imdur 30 mg daily  She is a history of sinus bradycardia but heart rates 70s-90s here  Hyperlipidemia  Lipid profile March 2025: Total cholesterol 107, triglycerides 143, HDL 32, LDL 46  Previously on pravastatin 40 mg daily   Hypothyroidism  TSH 5.080 with normal free T4 in March 2025  Type 2 diabetes mellitus with hyperglycemia, with long-term current use of insulin (HCC)  Lab Results   Component Value Date    HGBA1C 11.1 (H) 03/07/2025   Management per primary team    Plan/Recommendations  ECG findings are likely related to PE and uncontrolled hypertension  Recommend starting carvedilol 6.25 mg p.o. twice daily  Continue Imdur and diltiazem (reportedly not taking diltiazem prior to admission)  Continue ASA and statin  Follow-up TTE  Treatment of PE with IV heparin  per primary team  Her primary cardiologist retired. Outpatient follow-up will be arranged with [facility] per her request.  Please call with further questions       History of Present Illness   HPI: Yajaira Marsh is a 79 y.o. year old female with CAD s/p CABG 2000 (LIMG to LAD, SVG to OM1) with known occluded SVG to OM about a year later requiring LM and OM3 PCI, HTN, HLD, type 2 DM, and obesity who follows with cardiologist Dr. Quinn and was last seen in the office in May 2024.     TTE 2023: Mild LVH with normal LVEF 65% with mild diastolic dysfunction, mildly dilated RV with mildly reduced LV function, aortic sclerosis without stenosis    Cardiac catheterization October 2013: Patent stents, old occlusion of SVG to OM1 with patent LIMA to LAD.  RCA nonobstructive disease    Nuclear stress test May 2022: Normal    She originally presented to her doctor's office yesterday to follow-up on shortness of breath that has been ongoing for about a month.  After extensive discussion, the patient was convinced to go to the ED for further evaluation.  BP in the office 126/60 with pulse 94.    CTA chest revealed left lower lobe segmental and subsegmental PE with no evidence of right heart strain.  She has been placed on IV heparin.  High-sensitivity troponin was negative x 2.  Rest of labs unremarkable.    She had some T wave inversions on ECG and cardiology consulted for further evaluation.    She is resting comfortably in bed initially at time of exam.  She became anxious when speaking to myself and attending cardiologist.  She reports significant dyspnea but O2 saturation remains 100% on room air.  She has not been taking medications because she is unsure what to take due to multiple adjustments over the past couple of months.    EKG reviewed personally: NSR with inferolateral TWI    Telemetry reviewed personally: NSR    Review of Systems   Constitutional: Negative for chills, malaise/fatigue and  weight gain.   Cardiovascular:  Positive for dyspnea on exertion. Negative for chest pain, leg swelling, orthopnea, palpitations and syncope.   Respiratory:  Positive for shortness of breath. Negative for cough, sleep disturbances due to breathing and sputum production.    Endocrine: Negative.    Hematologic/Lymphatic: Negative.    Gastrointestinal:  Negative for bloating, nausea and vomiting.   Genitourinary: Negative.    Neurological:  Negative for dizziness, light-headedness and weakness.   Psychiatric/Behavioral:  Negative for altered mental status. The patient is nervous/anxious.    All other systems reviewed and are negative.    Historical Information   Past Medical History[1]  Past Surgical History[2]  Social History     Substance and Sexual Activity   Alcohol Use Never     Social History     Substance and Sexual Activity   Drug Use No     Tobacco Use History[3]  Family History: Family history non-contributory    Meds/Allergies   all current active meds have been reviewed and current meds:   Current Facility-Administered Medications:     acetaminophen (TYLENOL) tablet 650 mg, Q4H PRN    aspirin (ECOTRIN LOW STRENGTH) EC tablet 81 mg, Daily    cyanocobalamin (VITAMIN B-12) tablet 1,000 mcg, Daily    diltiazem (CARDIZEM CD) 24 hr capsule 120 mg, Daily    heparin (porcine) 25,000 units in 0.45% NaCl 250 mL infusion (premix), Titrated, Last Rate: 18 Units/kg/hr (05/30/25 0128)    heparin (porcine) injection 2,800 Units, Q6H PRN    heparin (porcine) injection 5,600 Units, Q6H PRN    insulin glargine (LANTUS) subcutaneous injection 25 Units 0.25 mL, HS    insulin lispro (HumALOG/ADMELOG) 100 units/mL subcutaneous injection 1-5 Units, TID AC **AND** Fingerstick Glucose (POCT), TID AC    insulin lispro (HumALOG/ADMELOG) 100 units/mL subcutaneous injection 1-5 Units, HS    isosorbide mononitrate (IMDUR) 24 hr tablet 30 mg, Daily    levothyroxine tablet 75 mcg, Early Morning    mirtazapine (REMERON) tablet 15 mg, HS     ondansetron (ZOFRAN) injection 4 mg, Q6H PRN  heparin (porcine), 3-30 Units/kg/hr (Order-Specific), Last Rate: 18 Units/kg/hr (25 0128)        Allergies[4]    Objective   Vitals: Blood pressure (!) 180/79, pulse 86, temperature 98.5 °F (36.9 °C), temperature source Temporal, resp. rate (!) 24, SpO2 96%., There is no height or weight on file to calculate BMI.,     Systolic (24hrs), Av , Min:126 , Max:194     Diastolic (24hrs), Av, Min:60, Max:97      No intake or output data in the 24 hours ending 25 0831  Wt Readings from Last 3 Encounters:   25 73.6 kg (162 lb 3.2 oz)   25 73.6 kg (162 lb 3.2 oz)   25 70.8 kg (156 lb)     Invasive Devices       Peripheral Intravenous Line  Duration             Peripheral IV 25 Right;Ventral (anterior) Forearm <1 day    Peripheral IV 25 Left Antecubital <1 day                    Physical Exam  Vitals reviewed.   Constitutional:       General: She is not in acute distress.  Neck:      Vascular: No hepatojugular reflux or JVD.     Cardiovascular:      Rate and Rhythm: Normal rate and regular rhythm.      Heart sounds: Normal heart sounds. No murmur heard.     No friction rub. No gallop.   Pulmonary:      Effort: Pulmonary effort is normal. No respiratory distress.      Breath sounds: No rales.      Comments: Clear, 100% room air  Abdominal:      General: Bowel sounds are normal. There is no distension.      Palpations: Abdomen is soft.      Tenderness: There is no abdominal tenderness.     Musculoskeletal:         General: No tenderness. Normal range of motion.      Cervical back: Neck supple.      Right lower leg: No edema.      Left lower leg: No edema.     Skin:     General: Skin is warm and dry.      Capillary Refill: Capillary refill takes less than 2 seconds.      Findings: No erythema.     Neurological:      Mental Status: She is alert and oriented to person, place, and time.     Psychiatric:         Mood and Affect: Mood  normal.         LABORATORY RESULTS:      CBC with diff:   Results from last 7 days   Lab Units 05/30/25  0559 05/29/25 2019   WBC Thousand/uL 8.58 9.43   HEMOGLOBIN g/dL 10.9* 11.7   HEMATOCRIT % 33.7* 35.8   MCV fL 87 87   PLATELETS Thousands/uL 190 212   RBC Million/uL 3.87 4.12   MCH pg 28.2 28.4   MCHC g/dL 32.3 32.7   RDW % 15.1 14.6   MPV fL 11.7 11.6   NRBC AUTO /100 WBCs  --  0       CMP:  Results from last 7 days   Lab Units 05/30/25  0559 05/29/25 2019   POTASSIUM mmol/L 3.7 4.0   CHLORIDE mmol/L 110* 109*   CO2 mmol/L 22 22   BUN mg/dL 17 20   CREATININE mg/dL 1.20 1.18   CALCIUM mg/dL 9.0 9.3   AST U/L  --  18   ALT U/L  --  12   ALK PHOS U/L  --  84   EGFR ml/min/1.73sq m 43 44       BMP:  Results from last 7 days   Lab Units 05/30/25  0559 05/29/25 2019   POTASSIUM mmol/L 3.7 4.0   CHLORIDE mmol/L 110* 109*   CO2 mmol/L 22 22   BUN mg/dL 17 20   CREATININE mg/dL 1.20 1.18   CALCIUM mg/dL 9.0 9.3       Lab Results   Component Value Date    CREATININE 1.20 05/30/2025    CREATININE 1.18 05/29/2025    CREATININE 1.44 (H) 04/23/2025       Lab Results   Component Value Date    NTBNP 46 04/24/2018    NTBNP 124 04/19/2017    NTBNP 72 04/18/2017                            Results from last 7 days   Lab Units 05/29/25 2019   INR  1.01     Lipid Profile:   Lab Results   Component Value Date    CHOL 161 11/16/2017    CHOL 172 02/19/2016    CHOL 155 11/09/2015     Lab Results   Component Value Date    HDL 32 (L) 03/25/2025    HDL 52 12/13/2021    HDL 44 (L) 11/16/2017     Lab Results   Component Value Date    LDLCALC 46 03/25/2025    LDLCALC 64 12/13/2021    LDLCALC 89 11/09/2015     Lab Results   Component Value Date    TRIG 143 03/25/2025    TRIG 111 12/13/2021    TRIG 147 11/16/2017       Cardiac testing:   No results found for this or any previous visit.    No results found for this or any previous visit.    No valid procedures specified.  No results found for this or any previous visit.      Imaging:  Results Review Statement: I reviewed radiology reports from this admission including: CT chest.  CTA chest pe study  Result Date: 5/30/2025  Narrative: CTA - CHEST WITH IV CONTRAST - PULMONARY ANGIOGRAM INDICATION: Elevated dimer, SOB. COMPARISON: 4/23/2025 TECHNIQUE: CTA examination of the chest was performed using angiographic technique according to a protocol specifically tailored to evaluate for pulmonary embolism. Multiplanar 2D reformatted images were created from the source data. In addition, coronal  3D MIP postprocessing was performed on the acquisition scanner. This examination was performed utilizing dual energy CT technique. Radiation dose length product (DLP) for this visit: 302 mGy-cm. . This examination, like all CT scans performed in the [facility], was performed utilizing techniques to minimize radiation dose exposure, including the use of iterative reconstruction and automated exposure control. IV Contrast: 75 mL of iohexol (OMNIPAQUE) FINDINGS: PULMONARY ARTERIAL TREE: Left lower lobe second and third order branch filling defects. Calculated right to left ventricle ratio: 0.8, within normal limits. LUNGS: Unchanged 0.6 cm and 0.2 cm right middle lobe nodules (603/131 and 121), considered benign appearance/behavior. Mild dependent and linear atelectasis. Patent central airways. PLEURA: Within normal limits. HEART/GREAT VESSELS: Unchanged cardiomegaly and atherosclerosis. Normal aortic caliber and enhancement. MEDIASTINUM AND SAMANTHA: Within normal limits. CHEST WALL AND LOWER NECK: Within normal limits. VISUALIZED STRUCTURES IN THE UPPER ABDOMEN: Chronic left renal cyst. OSSEOUS STRUCTURES: Degenerative changes. Median sternotomy. Right shoulder effusion with loose bodies. I personally discussed this study with Jolanta Constantino on 5/30/2025 7:29 AM.     Impression: Left lower lobe segmental subsegmental and subsegmental PE. No secondary evidence of right heart strain. Other stable  and nonemergent findings above. Computerized Assisted Algorithm (CAA) may have aided analysis of applicable images. Workstation performed: [id number]     XR chest 2 views  Result Date: 5/29/2025  Narrative: XR CHEST PA AND LATERAL INDICATION: SOB. COMPARISON: Chest radiograph April 23, 2025 FINDINGS: Clear lungs. No pneumothorax or pleural effusion. Normal sternotomy wires. Unchanged enlargement of the cardiomediastinal silhouette. Bones are unremarkable for age. Normal upper abdomen.     Impression: No acute cardiopulmonary disease. Workstation performed: [id number]     Thank you for allowing us to participate in this patient's care. This pt will follow up with Dr. Kai Tabares once discharged.     Counseling / Coordination of Care  Total floor / unit time spent today 45 minutes.  Greater than 50% of total time was spent with the patient and / or family counseling and / or coordination of care.  A description of the counseling / coordination of care: Review of history, current assessment, development of a plan.    Code Status: Level 1 - Full Code    ** Please Note: Dragon 360 Dictation voice to text software may have been used in the creation of this document. **         [1]   Past Medical History:  Diagnosis Date    Acute embolism and thrombosis of unspecified deep veins of unspecified lower extremity (HCC)     Last Assessed:  5/18/17    Anemia     Anosmia     Anxiety     Arthritis     Asthma     Back pain     Bilateral macular retinal edema     CAD (coronary artery disease)     Cataract     Cervical disc herniation     Cervical radiculopathy     Cervical spinal stenosis     Cervical spondylolysis     Chronic kidney disease     Chronic mastoiditis     Colon polyp     Complex endometrial hyperplasia     Depression     Diabetes mellitus (HCC)     Disease of thyroid gland     DVT (deep venous thrombosis) (HCC)     DVT (deep venous thrombosis) (HCC) 06/16/2017    Fibromyalgia     Fibromyalgia, primary     Hyperlipidemia      Hypertension     Hypothyroid     Kidney stone     Lumbar radiculopathy     Neck pain     Obese     PONV (postoperative nausea and vomiting)     Shingles 2021    Spinal stenosis     Stomach ulcer     Thyroid disease     Toxic metabolic encephalopathy 2025    Vascular claudication (HCC) 2017   [2]   Past Surgical History:  Procedure Laterality Date    BACK SURGERY      CARPAL TUNNEL RELEASE      CATARACT EXTRACTION      CHOLECYSTECTOMY      COLONOSCOPY      CORONARY ANGIOPLASTY      CORONARY ARTERY BYPASS GRAFT      CYSTOSCOPY N/A 2017    Procedure: CYSTOSCOPY;  Surgeon: Tacho Arnold MD;  Location: [facility];  Service: Gynecology Oncology    CYSTOSCOPY      ME LAPS TOTAL HYSTERECT 250 GM/< W/RMVL TUBE/OVARY N/A 2017    Procedure: ROBOTIC HYSTERECTOMY; BILATERAL SALPINO-OOPHERECTOMY; umbilical hernia repair.;  Surgeon: Tacho Arnold MD;  Location: [facility];  Service: Gynecology Oncology    ME REPAIR FIRST ABDOMINAL WALL HERNIA N/A 2022    Procedure: REPAIR HERNIA INCISIONAL;  Surgeon: Horacio Scherer DO;  Location: [facility];  Service: General    TONSILECTOMY AND ADNOIDECTOMY      TONSILLECTOMY      UMBILICAL HERNIA REPAIR     [3]   Social History  Tobacco Use   Smoking Status Former    Current packs/day: 0.00    Average packs/day: 1 pack/day for 6.0 years (6.0 ttl pk-yrs)    Types: Cigarettes    Start date:     Quit date:     Years since quittin.4    Passive exposure: Never   Smokeless Tobacco Never   Tobacco Comments    Smoked about a half a pack for about 4 yrs.  Quite about 50 yrs ago.   [4]   Allergies  Allergen Reactions    Bee Pollen Other (See Comments)    Cat Dander Other (See Comments)    Dog Epithelium (Canis Lupus Familiaris) Other (See Comments)    Molds & Smuts Allergic Rhinitis    Other Allergic Rhinitis     RAGWEED, CAT DANDER, DOG DANDER     Short Ragweed Pollen Ext Other (See Comments)    Pollen Extract Other (See Comments)     Cold  symptoms

## 2025-05-30 NOTE — ASSESSMENT & PLAN NOTE
Lipid profile March 2025: Total cholesterol 107, triglycerides 143, HDL 32, LDL 46  Previously on pravastatin 40 mg daily

## 2025-05-30 NOTE — ASSESSMENT & PLAN NOTE
"Patient presenting at the advice of her PCP with increasing dyspnea on exertion and orthopnea, also noted with leg swelling and abnormal EKG  D-dimer noted elevated during ED evaluation, subsequent CTA PE per \"V rad\" overnight read concerning for multiple segmental pulmonary emboli about the left lower lobe pulmonary arterial tree with evidence of right heart strain on CT.  Patient does report history of prior DVT for which she had completed anticoagulant course  Patient otherwise chemically stable and maintaining appropriate oxygenation on room air while at rest, troponin x 2 WNL  Started on heparin gtt. VTE protocol during ED evaluation, continue  Continue telemetry monitoring  Check bilateral lower extremity venous duplex, TTE  Eventually will need anticoagulant price check.  Anticipate need for indefinite course given prior history of DVT  "

## 2025-05-30 NOTE — ASSESSMENT & PLAN NOTE
S/p prior CABG x 4  Continue ASA, Imdur, statin therapy.  Unclear why patient is not on a beta-blocker.

## 2025-05-30 NOTE — ED NOTES
Pt placed in hospital bed for comfort   Pt still awaiting inpatient bed assignment  Heparin drip continues at 18 units and hour  Next ptt due at 0715 am     Yajaira David RN  05/30/25 0608

## 2025-05-30 NOTE — ASSESSMENT & PLAN NOTE
Patient presenting at the advice of her PCP with increasing dyspnea on exertion and orthopnea, also noted with leg swelling and abnormal EKG. Elevated ddimer in the ED prompting CTA PE study  CTA PE: Left lower lobe segmental subsegmental and subsegmental PE. No secondary evidence of right heart strain.   Patient does report history of prior DVT for which she had completed anticoagulant course  Patient otherwise chemically stable and maintaining appropriate oxygenation on room air while at rest, troponin x 2 WNL  Started on heparin gtt. VTE protocol during ED evaluation, continue  Continue telemetry monitoring  Check bilateral lower extremity venous duplex, TTE  Eventually will need anticoagulant price check.  Anticipate need for indefinite course given prior history of DVT

## 2025-05-30 NOTE — PLAN OF CARE
Problem: INFECTION - ADULT  Goal: Absence or prevention of progression during hospitalization  Description: INTERVENTIONS:  - Assess and monitor for signs and symptoms of infection  - Monitor lab/diagnostic results  - Monitor all insertion sites, i.e. indwelling lines, tubes, and drains  - Monitor endotracheal if appropriate and nasal secretions for changes in amount and color  - [place] appropriate cooling/warming therapies per order  - Administer medications as ordered  - Instruct and encourage patient and family to use good hand hygiene technique  - Identify and instruct in appropriate isolation precautions for identified infection/condition  Outcome: Progressing     Problem: SAFETY ADULT  Goal: Patient will remain free of falls  Description: INTERVENTIONS:  - Educate patient/family on patient safety including physical limitations  - Instruct patient to call for assistance with activity   - Consider consulting OT/PT to assist with strengthening/mobility based on AM PAC & JH-HLM score  - Consult OT/PT to assist with strengthening/mobility   - Keep Call bell within reach  - Keep bed low and locked with side rails adjusted as appropriate  - Keep care items and personal belongings within reach  - Initiate and maintain comfort rounds  - Make Fall Risk Sign visible to staff  - Offer Toileting every 2 Hours, in advance of need  - Initiate/Maintain alarm  - Obtain necessary fall risk management equipment:   - Apply yellow socks and bracelet for high fall risk patients  - Consider moving patient to room near nurses station  Outcome: Progressing     Problem: DISCHARGE PLANNING  Goal: Discharge to home or other facility with appropriate resources  Description: INTERVENTIONS:  - Identify barriers to discharge w/patient and caregiver  - Arrange for needed discharge resources and transportation as appropriate  - Identify discharge learning needs (meds, wound care, etc.)  - Arrange for interpretive services to assist at  discharge as needed  - Refer to Case Management Department for coordinating discharge planning if the patient needs post-hospital services based on physician/advanced practitioner order or complex needs related to functional status, cognitive ability, or social support system  Outcome: Progressing     Problem: Knowledge Deficit  Goal: Patient/family/caregiver demonstrates understanding of disease process, treatment plan, medications, and discharge instructions  Description: Complete learning assessment and assess knowledge base.  Interventions:  - Provide teaching at level of understanding  - Provide teaching via preferred learning methods  Outcome: Progressing

## 2025-05-30 NOTE — ASSESSMENT & PLAN NOTE
ECG reviewed showing normal sinus rhythm with inferolateral T wave inversions  Unable to view prior ECGs at cardiology office but reportedly has nonspecific ST/T wave changes at baseline  High-sensitivity troponin negative  Denies chest pain  TTE pending

## 2025-05-30 NOTE — PROGRESS NOTES
Outpatient Care Management note- ADT alert, inpatient admit    Chart review completed    Received notification that the patient is currently admitted to [facility] for SOB r/t PE.    Will continue to follow and outreach the patient once d/c to home.

## 2025-05-30 NOTE — ASSESSMENT & PLAN NOTE
Additionally with history of depression, appears somewhat anxious on examination  Continue PTA mirtazapine, needs ongoing follow-up with psychiatry

## 2025-05-30 NOTE — ASSESSMENT & PLAN NOTE
Lab Results   Component Value Date    HGBA1C 11.1 (H) 03/07/2025       Recent Labs     05/30/25  0156 05/30/25  0636   POCGLU 170* 181*       Blood Sugar Average: Last 72 hrs:  (P) 175.5  Patient follows with [facility] end[facility].  Diabetes uncontrolled as evidenced by A1c, patient reports both hyper and hypoglycemia  Patient states usually takes Toujeo 50 units nightly and sliding scale insulin with meals although admits she lowers doses depend on how much she eats but cannot clarify how much she reduces her insulin regimen by and admits to inconsistent use  Given inconsistencies we will manage for now with Lantus 25 units nightly, add correctional insulin coverage with Accu-Cheks.  Adjust inpatient insulin regimen as needed  Carb controlled diet  Initiate hypoglycemia protocol

## 2025-05-30 NOTE — ASSESSMENT & PLAN NOTE
Lab Results   Component Value Date    HGBA1C 11.1 (H) 03/07/2025       Recent Labs     05/30/25  0156   POCGLU 170*       Blood Sugar Average: Last 72 hrs:  (P) 170  Patient follows with [facility] end[facility].  Diabetes uncontrolled as evidenced by A1c, patient reports both hyper and hypoglycemia  Patient states usually takes Toujeo 50 units nightly and sliding scale insulin with meals although admits she lowers doses depend on how much she eats but cannot clarify how much she reduces her insulin regimen by and admits to inconsistent use  Given inconsistencies we will manage for now with Lantus 25 units nightly, add correctional insulin coverage with Accu-Cheks.  Adjust inpatient insulin regimen as needed  Carb controlled diet  Initiate hypoglycemia protocol

## 2025-05-31 LAB
AORTIC ROOT: 2.7 CM
APTT PPP: 71 SECONDS (ref 23–34)
APTT PPP: 85 SECONDS (ref 23–34)
APTT PPP: 99 SECONDS (ref 23–34)
ASCENDING AORTA: 3.2 CM
BSA FOR ECHO PROCEDURE: 1.74 M2
E WAVE DECELERATION TIME: 218 MS
E/A RATIO: 0.72
FRACTIONAL SHORTENING: 23 (ref 28–44)
GLUCOSE SERPL-MCNC: 175 MG/DL (ref 65–140)
GLUCOSE SERPL-MCNC: 219 MG/DL (ref 65–140)
GLUCOSE SERPL-MCNC: 253 MG/DL (ref 65–140)
GLUCOSE SERPL-MCNC: 264 MG/DL (ref 65–140)
INTERVENTRICULAR SEPTUM IN DIASTOLE (PARASTERNAL SHORT AXIS VIEW): 1.2 CM
INTERVENTRICULAR SEPTUM: 1.2 CM (ref 0.6–1.1)
LAAS-AP2: 13.5 CM2
LAAS-AP4: 16.7 CM2
LEFT ATRIUM SIZE: 3.2 CM
LEFT ATRIUM VOLUME (MOD BIPLANE): 46 ML
LEFT ATRIUM VOLUME INDEX (MOD BIPLANE): 26.4 ML/M2
LEFT INTERNAL DIMENSION IN SYSTOLE: 3 CM (ref 2.1–4)
LEFT VENTRICULAR INTERNAL DIMENSION IN DIASTOLE: 3.9 CM (ref 3.5–6)
LEFT VENTRICULAR POSTERIOR WALL IN END DIASTOLE: 1.3 CM
LEFT VENTRICULAR STROKE VOLUME: 30 ML
LV EF US.2D.A4C+ESTIMATED: 64 %
LVSV (TEICH): 30 ML
MV E'TISSUE VEL-LAT: 8 CM/S
MV E'TISSUE VEL-SEP: 7 CM/S
MV PEAK A VEL: 0.76 M/S
MV PEAK E VEL: 55 CM/S
MV STENOSIS PRESSURE HALF TIME: 63 MS
MV VALVE AREA P 1/2 METHOD: 3.49
RA PRESSURE ESTIMATED: 5 MMHG
RIGHT ATRIAL 2D VOLUME: 29 ML
RIGHT ATRIUM AREA SYSTOLE A4C: 13.8 CM2
SL CV LEFT ATRIUM LENGTH A2C: 3.7 CM
SL CV LV EF: 65
SL CV PED ECHO LEFT VENTRICLE DIASTOLIC VOLUME (MOD BIPLANE) 2D: 65 ML
SL CV PED ECHO LEFT VENTRICLE SYSTOLIC VOLUME (MOD BIPLANE) 2D: 35 ML
TRICUSPID ANNULAR PLANE SYSTOLIC EXCURSION: 1.3 CM

## 2025-05-31 PROCEDURE — 82948 REAGENT STRIP/BLOOD GLUCOSE: CPT

## 2025-05-31 PROCEDURE — 99233 SBSQ HOSP IP/OBS HIGH 50: CPT | Performed by: INTERNAL MEDICINE

## 2025-05-31 PROCEDURE — 85730 THROMBOPLASTIN TIME PARTIAL: CPT | Performed by: INTERNAL MEDICINE

## 2025-05-31 RX ORDER — OLANZAPINE 10 MG/2ML
2.5 INJECTION, POWDER, FOR SOLUTION INTRAMUSCULAR ONCE
Status: COMPLETED | OUTPATIENT
Start: 2025-05-31 | End: 2025-05-31

## 2025-05-31 RX ORDER — WATER 10 ML/10ML
INJECTION INTRAMUSCULAR; INTRAVENOUS; SUBCUTANEOUS
Status: COMPLETED
Start: 2025-05-31 | End: 2025-05-31

## 2025-05-31 RX ORDER — QUETIAPINE FUMARATE 25 MG/1
25 TABLET, FILM COATED ORAL
Status: DISCONTINUED | OUTPATIENT
Start: 2025-05-31 | End: 2025-06-02 | Stop reason: HOSPADM

## 2025-05-31 RX ADMIN — DILTIAZEM HYDROCHLORIDE 120 MG: 120 CAPSULE, COATED, EXTENDED RELEASE ORAL at 07:37

## 2025-05-31 RX ADMIN — NYSTATIN: 100000 POWDER TOPICAL at 07:38

## 2025-05-31 RX ADMIN — ASPIRIN 81 MG: 81 TABLET, COATED ORAL at 07:36

## 2025-05-31 RX ADMIN — INSULIN LISPRO 2 UNITS: 100 INJECTION, SOLUTION INTRAVENOUS; SUBCUTANEOUS at 21:15

## 2025-05-31 RX ADMIN — HEPARIN SODIUM 18 UNITS/KG/HR: 10000 INJECTION, SOLUTION INTRAVENOUS at 17:36

## 2025-05-31 RX ADMIN — INSULIN GLARGINE 25 UNITS: 100 INJECTION, SOLUTION SUBCUTANEOUS at 21:15

## 2025-05-31 RX ADMIN — ISOSORBIDE MONONITRATE 30 MG: 30 TABLET, EXTENDED RELEASE ORAL at 07:37

## 2025-05-31 RX ADMIN — QUETIAPINE 25 MG: 25 TABLET ORAL at 21:15

## 2025-05-31 RX ADMIN — WATER 10 ML: 1 INJECTION INTRAMUSCULAR; INTRAVENOUS; SUBCUTANEOUS at 00:39

## 2025-05-31 RX ADMIN — WATER 10 ML: 1 INJECTION INTRAMUSCULAR; INTRAVENOUS; SUBCUTANEOUS at 05:41

## 2025-05-31 RX ADMIN — INSULIN LISPRO 2 UNITS: 100 INJECTION, SOLUTION INTRAVENOUS; SUBCUTANEOUS at 12:00

## 2025-05-31 RX ADMIN — CARVEDILOL 6.25 MG: 6.25 TABLET, FILM COATED ORAL at 17:29

## 2025-05-31 RX ADMIN — OLANZAPINE 2.5 MG: 10 INJECTION, POWDER, LYOPHILIZED, FOR SOLUTION INTRAMUSCULAR at 05:41

## 2025-05-31 RX ADMIN — CARVEDILOL 6.25 MG: 6.25 TABLET, FILM COATED ORAL at 07:36

## 2025-05-31 RX ADMIN — CYANOCOBALAMIN TAB 500 MCG 1000 MCG: 500 TAB at 07:37

## 2025-05-31 RX ADMIN — INSULIN LISPRO 1 UNITS: 100 INJECTION, SOLUTION INTRAVENOUS; SUBCUTANEOUS at 17:30

## 2025-05-31 RX ADMIN — FLUTICASONE PROPIONATE 1 SPRAY: 50 SPRAY, METERED NASAL at 07:39

## 2025-05-31 RX ADMIN — OLANZAPINE 2.5 MG: 10 INJECTION, POWDER, LYOPHILIZED, FOR SOLUTION INTRAMUSCULAR at 00:39

## 2025-05-31 RX ADMIN — INSULIN LISPRO 2 UNITS: 100 INJECTION, SOLUTION INTRAVENOUS; SUBCUTANEOUS at 07:36

## 2025-05-31 RX ADMIN — ACETAMINOPHEN 650 MG: 325 TABLET ORAL at 21:14

## 2025-05-31 RX ADMIN — MIRTAZAPINE 15 MG: 15 TABLET, FILM COATED ORAL at 21:15

## 2025-05-31 RX ADMIN — NYSTATIN: 100000 POWDER TOPICAL at 17:30

## 2025-05-31 RX ADMIN — LEVOTHYROXINE SODIUM 75 MCG: 0.07 TABLET ORAL at 07:36

## 2025-05-31 NOTE — PLAN OF CARE
Problem: INFECTION - ADULT  Goal: Absence or prevention of progression during hospitalization  Description: INTERVENTIONS:  - Assess and monitor for signs and symptoms of infection  - Monitor lab/diagnostic results  - Monitor all insertion sites, i.e. indwelling lines, tubes, and drains  - Monitor endotracheal if appropriate and nasal secretions for changes in amount and color  - [place] appropriate cooling/warming therapies per order  - Administer medications as ordered  - Instruct and encourage patient and family to use good hand hygiene technique  - Identify and instruct in appropriate isolation precautions for identified infection/condition  Outcome: Progressing     Problem: Knowledge Deficit  Goal: Patient/family/caregiver demonstrates understanding of disease process, treatment plan, medications, and discharge instructions  Description: Complete learning assessment and assess knowledge base.  Interventions:  - Provide teaching at level of understanding  - Provide teaching via preferred learning methods  Outcome: Progressing

## 2025-05-31 NOTE — PLAN OF CARE
Problem: INFECTION - ADULT  Goal: Absence or prevention of progression during hospitalization  Description: INTERVENTIONS:  - Assess and monitor for signs and symptoms of infection  - Monitor lab/diagnostic results  - Monitor all insertion sites, i.e. indwelling lines, tubes, and drains  - Monitor endotracheal if appropriate and nasal secretions for changes in amount and color  - [place] appropriate cooling/warming therapies per order  - Administer medications as ordered  - Instruct and encourage patient and family to use good hand hygiene technique  - Identify and instruct in appropriate isolation precautions for identified infection/condition  Outcome: Progressing     Problem: DISCHARGE PLANNING  Goal: Discharge to home or other facility with appropriate resources  Description: INTERVENTIONS:  - Identify barriers to discharge w/patient and caregiver  - Arrange for needed discharge resources and transportation as appropriate  - Identify discharge learning needs (meds, wound care, etc.)  - Arrange for interpretive services to assist at discharge as needed  - Refer to Case Management Department for coordinating discharge planning if the patient needs post-hospital services based on physician/advanced practitioner order or complex needs related to functional status, cognitive ability, or social support system  Outcome: Progressing     Problem: Knowledge Deficit  Goal: Patient/family/caregiver demonstrates understanding of disease process, treatment plan, medications, and discharge instructions  Description: Complete learning assessment and assess knowledge base.  Interventions:  - Provide teaching at level of understanding  - Provide teaching via preferred learning methods  Outcome: Progressing

## 2025-05-31 NOTE — ASSESSMENT & PLAN NOTE
Lab Results   Component Value Date    HGBA1C 11.1 (H) 03/07/2025       Recent Labs     05/30/25  1228 05/30/25  1639 05/30/25  2102 05/31/25  0722   POCGLU 179* 173* 240* 253*       Blood Sugar Average: Last 72 hrs:  (P) [id number]  Patient follows with [facility] endocrinology.  Diabetes uncontrolled as evidenced by A1c, patient reports both hyper and hypoglycemia  Patient states usually takes Toujeo 50 units nightly and sliding scale insulin with meals although admits she lowers doses depend on how much she eats but cannot clarify how much she reduces her insulin regimen by and admits to inconsistent use  Given inconsistencies we will manage for now with Lantus 25 units nightly, add correctional insulin coverage with Accu-Cheks.  Adjust inpatient insulin regimen as needed  Carb controlled diet  Initiate hypoglycemia protocol
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
## 2025-05-31 NOTE — ASSESSMENT & PLAN NOTE
Additionally with history of depression, appears somewhat anxious on examination  Continue PTA mirtazapine, needs ongoing follow-up with psychiatry  Will consult geriatrics for cognitive decline and increasing restlessness and agitation particularly in the nighttime hours

## 2025-06-01 LAB
ANION GAP SERPL CALCULATED.3IONS-SCNC: 9 MMOL/L (ref 4–13)
APTT PPP: 146 SECONDS (ref 23–34)
APTT PPP: 48 SECONDS (ref 23–34)
BASOPHILS # BLD AUTO: 0.02 THOUSANDS/ÂΜL (ref 0–0.1)
BASOPHILS NFR BLD AUTO: 0 % (ref 0–1)
BUN SERPL-MCNC: 15 MG/DL (ref 5–25)
CALCIUM SERPL-MCNC: 9.2 MG/DL (ref 8.4–10.2)
CHLORIDE SERPL-SCNC: 110 MMOL/L (ref 96–108)
CO2 SERPL-SCNC: 22 MMOL/L (ref 21–32)
CREAT SERPL-MCNC: 1.3 MG/DL (ref 0.6–1.3)
EOSINOPHIL # BLD AUTO: 0.01 THOUSAND/ÂΜL (ref 0–0.61)
EOSINOPHIL NFR BLD AUTO: 0 % (ref 0–6)
ERYTHROCYTE [DISTWIDTH] IN BLOOD BY AUTOMATED COUNT: 15.1 % (ref 11.6–15.1)
GFR SERPL CREATININE-BSD FRML MDRD: 39 ML/MIN/1.73SQ M
GLUCOSE SERPL-MCNC: 140 MG/DL (ref 65–140)
GLUCOSE SERPL-MCNC: 144 MG/DL (ref 65–140)
GLUCOSE SERPL-MCNC: 145 MG/DL (ref 65–140)
GLUCOSE SERPL-MCNC: 146 MG/DL (ref 65–140)
GLUCOSE SERPL-MCNC: 176 MG/DL (ref 65–140)
HCT VFR BLD AUTO: 34.6 % (ref 34.8–46.1)
HGB BLD-MCNC: 10.9 G/DL (ref 11.5–15.4)
IMM GRANULOCYTES # BLD AUTO: 0.05 THOUSAND/UL (ref 0–0.2)
IMM GRANULOCYTES NFR BLD AUTO: 1 % (ref 0–2)
LYMPHOCYTES # BLD AUTO: 1.01 THOUSANDS/ÂΜL (ref 0.6–4.47)
LYMPHOCYTES NFR BLD AUTO: 15 % (ref 14–44)
MCH RBC QN AUTO: 27.7 PG (ref 26.8–34.3)
MCHC RBC AUTO-ENTMCNC: 31.5 G/DL (ref 31.4–37.4)
MCV RBC AUTO: 88 FL (ref 82–98)
MONOCYTES # BLD AUTO: 0.72 THOUSAND/ÂΜL (ref 0.17–1.22)
MONOCYTES NFR BLD AUTO: 11 % (ref 4–12)
NEUTROPHILS # BLD AUTO: 4.95 THOUSANDS/ÂΜL (ref 1.85–7.62)
NEUTS SEG NFR BLD AUTO: 73 % (ref 43–75)
NRBC BLD AUTO-RTO: 0 /100 WBCS
PLATELET # BLD AUTO: 168 THOUSANDS/UL (ref 149–390)
PMV BLD AUTO: 11.8 FL (ref 8.9–12.7)
POTASSIUM SERPL-SCNC: 3.8 MMOL/L (ref 3.5–5.3)
RBC # BLD AUTO: 3.93 MILLION/UL (ref 3.81–5.12)
SODIUM SERPL-SCNC: 141 MMOL/L (ref 135–147)
WBC # BLD AUTO: 6.76 THOUSAND/UL (ref 4.31–10.16)

## 2025-06-01 PROCEDURE — 85730 THROMBOPLASTIN TIME PARTIAL: CPT | Performed by: INTERNAL MEDICINE

## 2025-06-01 PROCEDURE — 82948 REAGENT STRIP/BLOOD GLUCOSE: CPT

## 2025-06-01 PROCEDURE — 80048 BASIC METABOLIC PNL TOTAL CA: CPT | Performed by: INTERNAL MEDICINE

## 2025-06-01 PROCEDURE — 85025 COMPLETE CBC W/AUTO DIFF WBC: CPT | Performed by: INTERNAL MEDICINE

## 2025-06-01 PROCEDURE — 99233 SBSQ HOSP IP/OBS HIGH 50: CPT | Performed by: INTERNAL MEDICINE

## 2025-06-01 RX ORDER — SODIUM CHLORIDE 9 MG/ML
50 INJECTION, SOLUTION INTRAVENOUS CONTINUOUS
Status: DISCONTINUED | OUTPATIENT
Start: 2025-06-01 | End: 2025-06-02

## 2025-06-01 RX ADMIN — CYANOCOBALAMIN TAB 500 MCG 1000 MCG: 500 TAB at 08:15

## 2025-06-01 RX ADMIN — QUETIAPINE 25 MG: 25 TABLET ORAL at 21:30

## 2025-06-01 RX ADMIN — ASPIRIN 81 MG: 81 TABLET, COATED ORAL at 08:15

## 2025-06-01 RX ADMIN — NYSTATIN: 100000 POWDER TOPICAL at 08:16

## 2025-06-01 RX ADMIN — ACETAMINOPHEN 650 MG: 325 TABLET ORAL at 11:48

## 2025-06-01 RX ADMIN — SODIUM CHLORIDE 50 ML/HR: 0.9 INJECTION, SOLUTION INTRAVENOUS at 17:14

## 2025-06-01 RX ADMIN — LEVOTHYROXINE SODIUM 75 MCG: 0.07 TABLET ORAL at 05:43

## 2025-06-01 RX ADMIN — HEPARIN SODIUM 2800 UNITS: 1000 INJECTION INTRAVENOUS; SUBCUTANEOUS at 08:25

## 2025-06-01 RX ADMIN — ISOSORBIDE MONONITRATE 30 MG: 30 TABLET, EXTENDED RELEASE ORAL at 08:15

## 2025-06-01 RX ADMIN — HEPARIN SODIUM 20 UNITS/KG/HR: 10000 INJECTION, SOLUTION INTRAVENOUS at 11:51

## 2025-06-01 RX ADMIN — APIXABAN 10 MG: 5 TABLET, FILM COATED ORAL at 17:05

## 2025-06-01 RX ADMIN — NYSTATIN: 100000 POWDER TOPICAL at 17:32

## 2025-06-01 RX ADMIN — ACETAMINOPHEN 650 MG: 325 TABLET ORAL at 05:43

## 2025-06-01 RX ADMIN — CARVEDILOL 6.25 MG: 6.25 TABLET, FILM COATED ORAL at 08:16

## 2025-06-01 RX ADMIN — DILTIAZEM HYDROCHLORIDE 120 MG: 120 CAPSULE, COATED, EXTENDED RELEASE ORAL at 08:16

## 2025-06-01 RX ADMIN — MIRTAZAPINE 15 MG: 15 TABLET, FILM COATED ORAL at 21:30

## 2025-06-01 RX ADMIN — CARVEDILOL 6.25 MG: 6.25 TABLET, FILM COATED ORAL at 17:05

## 2025-06-01 RX ADMIN — INSULIN GLARGINE 25 UNITS: 100 INJECTION, SOLUTION SUBCUTANEOUS at 21:30

## 2025-06-01 RX ADMIN — FLUTICASONE PROPIONATE 1 SPRAY: 50 SPRAY, METERED NASAL at 08:16

## 2025-06-01 RX ADMIN — INSULIN LISPRO 1 UNITS: 100 INJECTION, SOLUTION INTRAVENOUS; SUBCUTANEOUS at 17:03

## 2025-06-01 NOTE — PLAN OF CARE
Problem: INFECTION - ADULT  Goal: Absence or prevention of progression during hospitalization  Description: INTERVENTIONS:  - Assess and monitor for signs and symptoms of infection  - Monitor lab/diagnostic results  - Monitor all insertion sites, i.e. indwelling lines, tubes, and drains  - Monitor endotracheal if appropriate and nasal secretions for changes in amount and color  - [place] appropriate cooling/warming therapies per order  - Administer medications as ordered  - Instruct and encourage patient and family to use good hand hygiene technique  - Identify and instruct in appropriate isolation precautions for identified infection/condition  Outcome: Progressing     Problem: SAFETY ADULT  Goal: Patient will remain free of falls  Description: INTERVENTIONS:  - Educate patient/family on patient safety including physical limitations  - Instruct patient to call for assistance with activity   - Consider consulting OT/PT to assist with strengthening/mobility based on AM PAC & JH-HLM score  - Consult OT/PT to assist with strengthening/mobility   - Keep Call bell within reach  - Keep bed low and locked with side rails adjusted as appropriate  - Keep care items and personal belongings within reach  - Initiate and maintain comfort rounds  - Make Fall Risk Sign visible to staff  - Offer Toileting every 2 Hours, in advance of need  - Initiate/Maintain alarm  - Obtain necessary fall risk management equipment:   - Apply yellow socks and bracelet for high fall risk patients  - Consider moving patient to room near nurses station  Outcome: Progressing     Problem: DISCHARGE PLANNING  Goal: Discharge to home or other facility with appropriate resources  Description: INTERVENTIONS:  - Identify barriers to discharge w/patient and caregiver  - Arrange for needed discharge resources and transportation as appropriate  - Identify discharge learning needs (meds, wound care, etc.)  - Arrange for interpretive services to assist at  discharge as needed  - Refer to Case Management Department for coordinating discharge planning if the patient needs post-hospital services based on physician/advanced practitioner order or complex needs related to functional status, cognitive ability, or social support system  Outcome: Progressing     Problem: Knowledge Deficit  Goal: Patient/family/caregiver demonstrates understanding of disease process, treatment plan, medications, and discharge instructions  Description: Complete learning assessment and assess knowledge base.  Interventions:  - Provide teaching at level of understanding  - Provide teaching via preferred learning methods  Outcome: Progressing     Problem: Prexisting or High Potential for Compromised Skin Integrity  Goal: Skin integrity is maintained or improved  Description: INTERVENTIONS:  - Identify patients at risk for skin breakdown  - Assess and monitor skin integrity including under and around medical devices   - Assess and monitor nutrition and hydration status  - Monitor labs  - Assess for incontinence   - Turn and reposition patient  - Assist with mobility/ambulation  - Relieve pressure over crista prominences   - Avoid friction and shearing  - Provide appropriate hygiene as needed including keeping skin clean and dry  - Evaluate need for skin moisturizer/barrier cream  - Collaborate with interdisciplinary team  - Patient/family teaching  - Consider wound care consult    Assess:  - Review Dante scale daily  - Clean and moisturize skin every day  - Inspect skin when repositioning, toileting, and assisting with ADLS  - Assess extremities for adequate circulation and sensation     Bed Management:  - Have minimal linens on bed & keep smooth, unwrinkled  - Change linens as needed when moist or perspiring  - Avoid sitting or lying in one position for more than 2 hours while in bed?Keep HOB at 45 degrees   - Toileting:  - Offer bedside commode  - Assess for incontinence every 2 hrs  - Use  incontinent care products after each incontinent episode such as hydra-guard    Activity:  - Mobilize patient 2 times a day  - Encourage activity and walks on unit  - Encourage or provide ROM exercises   - Turn and reposition patient every 2 Hours  - Use appropriate equipment to lift or move patient in bed  - Instruct/ Assist with weight shifting every 2 hrs when out of bed in chair  - Consider limitation of chair time 2 hour intervals    Skin Care:  - Avoid use of baby powder, tape, friction and shearing, hot water or constrictive clothing  - Relieve pressure over bony prominences using pillows  - Do not massage red bony areas    Next Steps:  - Consider consults to  interdisciplinary teams such as pt/ot  Outcome: Progressing     Problem: SAFETY,RESTRAINT: NV/NON-SELF DESTRUCTIVE BEHAVIOR  Goal: Remains free of harm/injury (restraint for non violent/non self-detsructive behavior)  Description: INTERVENTIONS:  - Instruct patient/family regarding restraint use   - Assess and monitor physiologic and psychological status   - Provide interventions and comfort measures to meet assessed patient needs   - Identify and implement measures to help patient regain control  - Assess readiness for release of restraint   Outcome: Progressing  Goal: Returns to optimal restraint-free functioning  Description: INTERVENTIONS:  - Assess the patient's behavior and symptoms that indicate continued need for restraint  - Identify and implement measures to help patient regain control  - Assess readiness for release of restraint   Outcome: Progressing     Problem: Nutrition/Hydration-ADULT  Goal: Nutrient/Hydration intake appropriate for improving, restoring or maintaining nutritional needs  Description: Monitor and assess patient's nutrition/hydration status for malnutrition. Collaborate with interdisciplinary team and initiate plan and interventions as ordered.  Monitor patient's weight and dietary intake as ordered or per policy. Utilize  nutrition screening tool and intervene as necessary. Determine patient's food preferences and provide high-protein, high-caloric foods as appropriate.     INTERVENTIONS:  - Monitor oral intake, urinary output, labs, and treatment plans  - Assess nutrition and hydration status and recommend course of action  - Evaluate amount of meals eaten  - Assist patient with eating if necessary   - Allow adequate time for meals  - Recommend/ encourage appropriate diets, oral nutritional supplements, and vitamin/mineral supplements  - Order, calculate, and assess calorie counts as needed  - Recommend, monitor, and adjust tube feedings and TPN/PPN based on assessed needs  - Assess need for intravenous fluids  - Provide specific nutrition/hydration education as appropriate  - Include patient/family/caregiver in decisions related to nutrition  Outcome: Progressing

## 2025-06-01 NOTE — ASSESSMENT & PLAN NOTE
Lab Results   Component Value Date    HGBA1C 11.1 (H) 03/07/2025       Recent Labs     05/31/25  1148 05/31/25  1607 05/31/25  2100 06/01/25  0750   POCGLU 219* 175* 264* 140       Blood Sugar Average: Last 72 hrs:  (P) [id number]  Patient follows with [facility] endocrinology.  Diabetes uncontrolled as evidenced by A1c, patient reports both hyper and hypoglycemia  Patient states usually takes Toujeo 50 units nightly and sliding scale insulin with meals although admits she lowers doses depend on how much she eats but cannot clarify how much she reduces her insulin regimen by and admits to inconsistent use  Given inconsistencies we will manage for now with Lantus 25 units nightly, add correctional insulin coverage with Accu-Cheks.  Adjust inpatient insulin regimen as needed  Carb controlled diet  Initiate hypoglycemia protocol

## 2025-06-01 NOTE — PROGRESS NOTES
"Progress Note - Hospitalist   Name: Yajaira Marsh 79 y.o. female I MRN: [id number]  [facility] I Date of Admission: 5/29/2025   Date of Service: 6/1/2025 I Hospital Day: 2     Assessment & Plan  Pulmonary embolus (HCC)  Patient presenting at the advice of her PCP with increasing dyspnea on exertion and orthopnea, also noted with leg swelling and abnormal EKG  D-dimer noted elevated during ED evaluation, subsequent CTA PE per \"V rad\" overnight read concerning for multiple segmental pulmonary emboli about the left lower lobe pulmonary arterial tree with evidence of right heart strain on CT.  Patient does report history of prior DVT for which she had completed anticoagulant course  Patient otherwise chemically stable and maintaining appropriate oxygenation on room air while at rest, troponin x 2 WNL  Started on heparin gtt. VTE protocol during ED evaluation, continue  Continue telemetry monitoring  Check bilateral lower extremity venous duplex, TTE  Eventually will need anticoagulant price check.  Anticipate need for indefinite course given prior history of DVT  Abnormal EKG  EKG on admission demonstrated new T wave versions in numerous leads of uncertain significance.  Patient without complaints of chest pain at this time and again with troponin x 2 WNL however with new complaints of dyspnea on exertion.  Unclear EKG changes all from pulmonary embolism or if there is other cardiac process ongoing  TTE to be obtained as above, appreciate cardiology insight.  Continue telemetry monitoring  CAD s/p CABG  S/p prior CABG x 4  Continue ASA, Imdur, statin therapy.  Unclear why patient is not on a beta-blocker.  Type 2 diabetes mellitus with hyperglycemia, with long-term current use of insulin ([facility])  Lab Results   Component Value Date    HGBA1C 11.1 (H) 03/07/2025       Recent Labs     05/31/25  1148 05/31/25  1607 05/31/25  2100 06/01/25  0750   POCGLU 219* 175* 264* 140       Blood Sugar Average: Last 72 " hrs:  (P) [id number]  Patient follows with [facility] endocrinology.  Diabetes uncontrolled as evidenced by A1c, patient reports both hyper and hypoglycemia  Patient states usually takes Toujeo 50 units nightly and sliding scale insulin with meals although admits she lowers doses depend on how much she eats but cannot clarify how much she reduces her insulin regimen by and admits to inconsistent use  Given inconsistencies we will manage for now with Lantus 25 units nightly, add correctional insulin coverage with Accu-Cheks.  Adjust inpatient insulin regimen as needed  Carb controlled diet  Initiate hypoglycemia protocol  Hypothyroidism  Continue PTA levothyroxine  MADAN (generalized anxiety disorder)  Additionally with history of depression, appears somewhat anxious on examination  Continue PTA mirtazapine, needs ongoing follow-up with psychiatry  Will consult geriatrics for cognitive decline and increasing restlessness and agitation particularly in the nighttime hours  Hyperlipidemia    Essential hypertension    VTE Pharmacologic Prophylaxis:   Pharmacologic: Heparin Drip  Mechanical VTE Prophylaxis in Place: No    Patient Centered Rounds: I have performed bedside rounds with nursing staff today.        Time Spent for Care: 1 hour.  More than 50% of total time spent on counseling and coordination of care as described above.    Current Length of Stay: 2 day(s)    Current Patient Status: Inpatient     Code Status: Level 1 - Full Code      Subjective:   nad    Objective:     Vitals:   Temp (24hrs), Av.9 °F (37.7 °C), Min:99 °F (37.2 °C), Max:100.7 °F (38.2 °C)    Temp:  [99 °F (37.2 °C)-100.7 °F (38.2 °C)] 99 °F (37.2 °C)  HR:  [68-91] 71  Resp:  [16-21] 18  BP: (127-161)/(57-67) 129/57  SpO2:  [94 %-97 %] 96 %  Body mass index is 31.18 kg/m².     Input and Output Summary (last 24 hours):       Intake/Output Summary (Last 24 hours) at 2025 0906  Last data filed at 2025 0001  Gross per 24 hour    Intake --   Output 677 ml   Net -677 ml       Physical Exam:     Physical Exam  Constitutional:       Appearance: Normal appearance.   HENT:      Head: Normocephalic and atraumatic.     Cardiovascular:      Rate and Rhythm: Normal rate and regular rhythm.     Skin:     General: Skin is warm and dry.     Neurological:      Mental Status: She is alert.         Additional Data:     Labs:    Results from last 7 days   Lab Units 06/01/25  0651   WBC Thousand/uL 6.76   HEMOGLOBIN g/dL 10.9*   HEMATOCRIT % 34.6*   PLATELETS Thousands/uL 168   SEGS PCT % 73   LYMPHO PCT % 15   MONO PCT % 11   EOS PCT % 0     Results from last 7 days   Lab Units 06/01/25  0615 05/30/25  0559 05/29/25 2019   POTASSIUM mmol/L 3.8   < > 4.0   CHLORIDE mmol/L 110*   < > 109*   CO2 mmol/L 22   < > 22   BUN mg/dL 15   < > 20   CREATININE mg/dL 1.30   < > 1.18   CALCIUM mg/dL 9.2   < > 9.3   ALK PHOS U/L  --   --  84   ALT U/L  --   --  12   AST U/L  --   --  18    < > = values in this interval not displayed.     Results from last 7 days   Lab Units 05/29/25  2019   INR  1.01       Recent Cultures (last 7 days):           Last 24 Hours Medication List:   Current Facility-Administered Medications   Medication Dose Route Frequency Provider Last Rate    acetaminophen  650 mg Oral Q4H PRN Rob Ramo, DO      aspirin  81 mg Oral Daily Rob Ramo, DO      carvedilol  6.25 mg Oral BID With Meals ALAN Thornton      cyanocobalamin  1,000 mcg Oral Daily Rob Ramo, DO      diltiazem  120 mg Oral Daily Rob Ramo, DO      fluticasone  1 spray Each Nare Daily Jolanta Constantino PA-C      heparin (porcine)  3-30 Units/kg/hr (Order-Specific) Intravenous Titrated Rob Ramo, DO 20 Units/kg/hr (06/01/25 0821)    heparin (porcine)  2,800 Units Intravenous Q6H PRN Rob Ramo, DO      heparin (porcine)  5,600 Units Intravenous Q6H PRN Rob Ramo, DO      hydrOXYzine HCL  25 mg Oral Q6H PRN Jolanta Constantino PA-C      insulin  glargine  25 Units Subcutaneous HS Rob Ralph, DO      insulin lispro  1-5 Units Subcutaneous TID AC Rob Ralph, DO      insulin lispro  1-5 Units Subcutaneous HS Rob Ralph, DO      isosorbide mononitrate  30 mg Oral Daily Rob Ralph, DO      levothyroxine  75 mcg Oral Early Morning Rob Ralph, DO      mirtazapine  15 mg Oral HS Rob Ralph, DO      nystatin   Topical BID Jolanta Constantino PA-C      ondansetron  4 mg Intravenous Q6H PRN Rob Ralph, DO      QUEtiapine  25 mg Oral HS Salvatore Mack DO          Today, Patient Was Seen By: Salvatore Mack DO    ** Please Note: Dictation voice to text software may have been used in the creation of this document. **

## 2025-06-01 NOTE — UTILIZATION REVIEW
Initial Clinical Review    Tx start date 5/29 in ED    Admission: Date/Time/Statement:   Admission Orders (From admission, onward)       Ordered        05/30/25 0110  INPATIENT ADMISSION  Once                          Orders Placed This Encounter   Procedures    INPATIENT ADMISSION     Standing Status:   Standing     Number of Occurrences:   1     Level of Care:   Med Surg [16]     Estimated length of stay:   More than 2 Midnights     Certification:   I certify that inpatient services are medically necessary for this patient for a duration of greater than two midnights. See H&P and MD Progress Notes for additional information about the patient's course of treatment.     ED Arrival Information       Expected   5/29/2025     Arrival   5/29/2025 17:44    Acuity   Urgent              Means of arrival   Walk-In    Escorted by   Self    Service   Hospitalist    Admission type   Emergency              Arrival complaint   Shortness of breath             Chief Complaint   Patient presents with    Shortness of Breath     Pt has been having increased SOB, sent from pcp for CHF or possible PE, denies CP       Initial Presentation: 79 y.o. female to ED on 5/29 as a walk-in with increasing dyspnea on exertion and orthopnea, and b/l LE swelling.   On presentation BP elevated. D-dimer elevated, CTA PE showed multiple segmental pulmonary emboli about the LLL pulmonary arterial tree with evidence of right heart strain on CT. Patient does report history of prior DVT for which she had completed anticoagulant course. O2 sat remains stable on RA. EKG also showed new T-wave versions in numerous leads of uncertain significance.  Started on a hep gtt in ED.    PMHx: CAD s/p CABG x4, T2DM (uncontrolled), MADAN, hypothyroidism. Pt states usually takes Toujeo 50 units nightly and sliding scale insulin with meals although admits she lowers doses depend on how much she eats but cannot clarify how much she reduces her insulin regimen by and admits  to inconsistent use. Last A1c elevated.  Admitted Inpatient to [facility] unit with Pulmonary Embolism - continue hep gtt. Telemetry. Check bl LE venous duplex, TTE. CM consulted for assist with AC price check on d/c. Continue ASA, Imdur, statin therapy. Cons carb diet. Lantus 25 units nightly, Accu-Cheks. Adjust inpatient insulin regimen as needed. Cardiology consult.    Anticipated Length of Stay/Certification Statement: Patient will be admitted on an Inpatient basis with an anticipated length of stay of greater than 2 midnights.   Justification for Hospital Stay: Segmental pulmonary embolism with CT evidence of right heart strain additionally with abnormal EKG requiring IV heparin, echocardiogram, telemetry monitoring, clinical monitoring.  Additionally will require cardiology consultation for abnormal EKG.     Cardiology consult -- Abnormal ECG - pt with h/o nonspecific ST and T wave changes in the inferior lateral leads dating back many years.  The slight worsening of T wave inversions remains nonspecific, and likely associated with RV strain from PE.  No cardiac workup required except checking echocardiogram for RV strain.   CAD - h/o CABG back in 2000.  She has a known occlusion of the vein graft to her OM and then subsequently had PCI/stenting of the left main and OM 3.  Restart op meds that include aspirin and statin.  Agree with changing diltiazem to carvedilol.  She formally followed with Dr. Delacruz who is now retired.  We will get her established with our practice.  No further IP recs.    Date: 5/31  Day 3: Has surpassed a 2nd midnight with active treatments and services for continued tx of PE. Continue AC and other po meds. Hep gtt d/c'd and apixaban started.   Pt had restlessness and agitation last night. Per daughter pt has been having increased aggressive behavior at home towards her dad for the past year. Also questions compliance with her meds. Consult geriatric medicine. CM for d/c assist.      ED  Treatment-Medication Administration from 05/29/2025 1705 to 05/30/2025 0930         Date/Time Order Dose Route Action     05/29/2025 2025 midazolam (VERSED) injection 1 mg 1 mg Intravenous Given     05/29/2025 2212 midazolam (VERSED) injection 1 mg 1 mg Intravenous Given     05/30/2025 0126 heparin (porcine) injection 5,600 Units 5,600 Units Intravenous Given     05/30/2025 0128 heparin (porcine) 25,000 units in 0.45% NaCl 250 mL infusion (premix) 18 Units/kg/hr Intravenous New Bag     05/30/2025 0922 cyanocobalamin (VITAMIN B-12) tablet 1,000 mcg 1,000 mcg Oral Given     05/30/2025 0917 diltiazem (CARDIZEM CD) 24 hr capsule 120 mg 120 mg Oral Given     05/30/2025 0917 isosorbide mononitrate (IMDUR) 24 hr tablet 30 mg 30 mg Oral Given     05/30/2025 0637 levothyroxine tablet 75 mcg 75 mcg Oral Given     05/30/2025 0917 insulin lispro (HumALOG/ADMELOG) 100 units/mL subcutaneous injection 1-5 Units 1 Units Subcutaneous Given     05/30/2025 0157 insulin lispro (HumALOG/ADMELOG) 100 units/mL subcutaneous injection 1-5 Units 1 Units Subcutaneous Given     05/30/2025 0917 aspirin (ECOTRIN LOW STRENGTH) EC tablet 81 mg 81 mg Oral Given     05/30/2025 0917 hydrOXYzine HCL (ATARAX) tablet 25 mg 25 mg Oral Given       Scheduled Medications:  apixaban, 10 mg, Oral, BID   Followed by  [START ON 6/8/2025] apixaban, 5 mg, Oral, BID  aspirin, 81 mg, Oral, Daily  carvedilol, 6.25 mg, Oral, BID With Meals  cyanocobalamin, 1,000 mcg, Oral, Daily  diltiazem, 120 mg, Oral, Daily  fluticasone, 1 spray, Each Nare, Daily  insulin glargine, 25 Units, Subcutaneous, HS  insulin lispro, 1-5 Units, Subcutaneous, TID AC  insulin lispro, 1-5 Units, Subcutaneous, HS  isosorbide mononitrate, 30 mg, Oral, Daily  levothyroxine, 75 mcg, Oral, Early Morning  mirtazapine, 15 mg, Oral, HS  nystatin, , Topical, BID  QUEtiapine, 25 mg, Oral, HS    heparin (porcine) 25,000 units in 0.45% NaCl 250 mL infusion (premix)  Rate: 2.1-21 mL/hr Dose: 3-30  Units/kg/hr  Weight Dosing Info: 70 kg (Order-Specific)  Freq: Titrated Route: IV  Last Dose: Stopped (06/01/25 1443)  Start: 05/30/25 0100 End: 06/01/25 1438    PRN Meds:  acetaminophen, 650 mg, Oral, Q4H PRN 5/31 x1, 6/1 x2  hydrOXYzine HCL, 25 mg, Oral, Q6H PRN 5/30 x1  ondansetron, 4 mg, Intravenous, Q6H PRN      ED Triage Vitals   Temperature Pulse Respirations Blood Pressure SpO2 Pain Score   05/29/25 1747 05/29/25 1747 05/29/25 1747 05/29/25 1747 05/29/25 1747 05/30/25 0400   98.5 °F (36.9 °C) 85 22 (!) 194/76 100 % No Pain     Weight (last 2 days)       Date/Time Weight    05/30/25 1700 74.8 (165)    05/30/25 0938 74.8 (165)            Vital Signs (last 3 days)       Date/Time Temp Pulse Resp BP MAP (mmHg) SpO2 O2 Device Patient Position - Orthostatic VS Pain    06/01/25 14:04:21 97.9 °F (36.6 °C) 67 -- 133/61 85 96 % -- -- --    06/01/25 1148 -- -- -- -- -- -- -- -- Med Not Given for Pain - for MAR use only    06/01/25 08:12:53 99 °F (37.2 °C) 71 -- 129/57 81 96 % -- -- --    06/01/25 07:52:03 99.3 °F (37.4 °C) 68 18 127/57 80 96 % -- -- --    06/01/25 0543 -- -- -- -- -- -- -- -- Med Not Given for Pain - for MAR use only    05/31/25 21:53:24 100.4 °F (38 °C) 87 18 151/66 94 94 % -- -- --    05/31/25 2115 -- -- -- -- -- -- None (Room air) -- No Pain    05/31/25 15:33:33 100.7 °F (38.2 °C) 91 21 161/67 98 97 % -- -- --    05/31/25 11:26:29 -- 77 16 143/64 90 96 % -- -- --    05/31/25 0730 -- -- -- -- -- -- None (Room air) -- No Pain    05/31/25 07:23:17 100.9 °F (38.3 °C) 105 18 179/93 122 96 % -- -- --    05/30/25 22:28:42 97.5 °F (36.4 °C) 84 17 163/76 105 99 % -- -- --    05/30/25 18:56:39 98.1 °F (36.7 °C) 89 18 141/63 89 100 % -- -- --    05/30/25 1700 -- 85 -- 135/67 -- -- -- -- --    05/30/25 1645 -- -- -- -- -- -- None (Room air) -- --    05/30/25 15:14:38 98.1 °F (36.7 °C) 72 17 135/67 90 98 % -- -- --    05/30/25 1030 -- -- -- -- -- -- None (Room air) -- No Pain    05/30/25 0938 -- 89 -- -- -- 93 %  None (Room air) -- --    05/30/25 0800 -- 86 24 180/79 114 96 % -- -- --    05/30/25 0700 -- 86 20 147/65 93 97 % -- -- --    05/30/25 0600 -- 86 22 155/97 121 95 % -- -- No Pain    05/30/25 0400 -- 88 31 194/91 130 95 % None (Room air) -- No Pain    05/30/25 0300 -- 78 20 147/64 92 94 % -- -- --    05/30/25 0200 -- 88 20 189/80 115 96 % None (Room air) Lying --    05/30/25 0100 -- 86 21 175/79 113 99 % -- -- --    05/29/25 1747 98.5 °F (36.9 °C) 85 22 194/76 -- 100 % None (Room air) -- --              Pertinent Labs/Diagnostic Test Results:   Radiology:   VAS VENOUS DUPLEX - LOWER LIMB BILATERAL   Final Interpretation by Jamin Mai MD (05/30 1547)      CTA chest pe study   Final Interpretation by Samira Khan MD (05/30 0730)      Left lower lobe segmental subsegmental and subsegmental PE. No secondary evidence of right heart strain.      Other stable and nonemergent findings above.                  Computerized Assisted Algorithm (CAA) may have aided analysis of applicable images.      Workstation performed: [id number]         XR chest 2 views   Final Interpretation by Santos Carrasco MD (05/29 7778)      No acute cardiopulmonary disease.            Workstation performed: [id number]           Cardiology:  Echo complete w/ contrast if indicated   Final Result by Ash Hudson MD (05/31 2796)        Left Ventricle: Left ventricular cavity size is normal. Wall thickness    is mildly increased. There is mild concentric hypertrophy. The left    ventricular ejection fraction is 65%. Systolic function is vigorous. Basal    inferolateral and basal inferior hypokinesis is present, otherwise wall    motion is normal. Diastolic function is mildly abnormal, consistent with    grade I (abnormal) relaxation.     Right Ventricle: Right ventricular cavity size is topnormal. Systolic    function is mildly reduced.     Aortic Valve: There is aortic valve sclerosis.     Mitral Valve: There is moderate annular  calcification.         ECG 12 lead   Final Result by Giovanny Bradshaw MD (05/30 0947)   Normal sinus rhythm   Poor R wave progression   Nonspecific T wave abnormality   Abnormal ECG   When compared with ECG of 23-Apr-2025 18:14,   No significant change was found   Confirmed by Giovanny Bradshaw ([id number]) on 5/30/2025 9:47:35 AM            Results from last 7 days   Lab Units 06/01/25  0651 05/30/25 0559 05/29/25 2019   WBC Thousand/uL 6.76 8.58 9.43   HEMOGLOBIN g/dL 10.9* 10.9* 11.7   HEMATOCRIT % 34.6* 33.7* 35.8   PLATELETS Thousands/uL 168 190 212   TOTAL NEUT ABS Thousands/µL 4.95  --  6.12         Results from last 7 days   Lab Units 06/01/25  0615 05/30/25 0559 05/29/25 2019   SODIUM mmol/L 141 140 140   POTASSIUM mmol/L 3.8 3.7 4.0   CHLORIDE mmol/L 110* 110* 109*   CO2 mmol/L 22 22 22   ANION GAP mmol/L 9 8 9   BUN mg/dL 15 17 20   CREATININE mg/dL 1.30 1.20 1.18   EGFR ml/min/1.73sq m 39 43 44   CALCIUM mg/dL 9.2 9.0 9.3     Results from last 7 days   Lab Units 05/29/25 2019   AST U/L 18   ALT U/L 12   ALK PHOS U/L 84   TOTAL PROTEIN g/dL 7.0   ALBUMIN g/dL 3.6   TOTAL BILIRUBIN mg/dL 0.49     Results from last 7 days   Lab Units 06/01/25  1640 06/01/25  1136 06/01/25  0750 05/31/25  2100 05/31/25  1607 05/31/25  1148 05/31/25  0722 05/30/25  2102 05/30/25  1639 05/30/25  1228 05/30/25  0910 05/30/25  0636   POC GLUCOSE mg/dl 176* 144* 140 264* 175* 219* 253* 240* 173* 179* 177* 181*     Results from last 7 days   Lab Units 06/01/25  0615 05/30/25  0559 05/29/25 2019   GLUCOSE RANDOM mg/dL 146* 172* 132       Beta- Hydroxybutyrate   Date Value Ref Range Status   03/04/2025 0.06 0.02 - 0.27 mmol/L Final   02/11/2025 0.19 0.02 - 0.27 mmol/L Final      Results from last 7 days   Lab Units 05/30/25  0125 05/29/25 2019   HS TNI 0HR ng/L  --  14   HS TNI 2HR ng/L 20  --    HSTNI D2 ng/L 6  --      Results from last 7 days   Lab Units 05/29/25 2019   D-DIMER QUANTITATIVE ug/ml FEU 2.51*     Results from last 7  days   Lab Units 06/01/25  1427 06/01/25  0651 05/31/25  2236 05/30/25  0125 05/29/25 2019   PROTIME seconds  --   --   --   --  13.6   INR   --   --   --   --  1.01   PTT seconds 146* 48* 85*   < > 22*    < > = values in this interval not displayed.         Past Medical History[1]  Present on Admission:   Pulmonary embolus (HCC)   Abnormal EKG   CAD s/p CABG   Hypothyroidism   MADAN (generalized anxiety disorder)   Hyperlipidemia   Essential hypertension      Admitting Diagnosis: Shortness of breath [R06.02]  Pulmonary embolism (HCC) [I26.99]  T wave inversion in EKG [R94.31]  Age/Sex: 79 y.o. female    Network Utilization Review Department  ATTENTION: Please call with any questions or concerns to [phone] and carefully listen to the prompts so that you are directed to the right person. All voicemails are confidential.   For Discharge needs, contact Care Management DC Support Team at [phone] opt. 2  Send all requests for admission clinical reviews, approved or denied determinations and any other requests to dedicated fax number below belonging to the campus where the patient is receiving treatment. List of dedicated fax numbers for the Facilities:  FACILITY NAME UR FAX NUMBER   ADMISSION DENIALS (Administrative/Medical Necessity) [phone]   DISCHARGE SUPPORT TEAM (NETWORK) [phone]   PARENT CHILD HEALTH (Maternity/NICU/Pediatrics) [phone]   [facility] [phone]   [facility] [phone]   [facility] [phone]   [facility] [phone]   [facility] [phone]   [facility] [phone]   [facility] [phone]   [facility] [phone]   [facility] [phone]   Critical access hospital [phone]   [facility] [phone]   [facility] [phone]              [1]   Past Medical History:  Diagnosis Date    Acute embolism and thrombosis of unspecified deep veins of unspecified lower extremity (HCC)     Last Assessed:  5/18/17    Anemia     Anosmia     Anxiety     Arthritis     Asthma     Back pain     Bilateral macular retinal edema     CAD (coronary artery disease)     Cataract     Cervical disc herniation     Cervical radiculopathy     Cervical spinal stenosis     Cervical spondylolysis     Chronic kidney disease     Chronic mastoiditis     Colon polyp     Complex endometrial hyperplasia     Depression     Diabetes mellitus (HCC)     Disease of thyroid gland     DVT (deep venous thrombosis) (HCC)     DVT (deep venous thrombosis) ([facility]) 06/16/2017    Fibromyalgia     Fibromyalgia, primary     Hyperlipidemia     Hypertension     Hypothyroid     Kidney stone     Lumbar radiculopathy     Neck pain     Obese     PONV (postoperative nausea and vomiting)     Shingles 07/01/2021    Spinal stenosis     Stomach ulcer     Thyroid disease     Toxic metabolic encephalopathy 02/11/2025    Vascular claudication ([facility]) 12/11/2017

## 2025-06-01 NOTE — CASE MANAGEMENT
Case Management Discharge Planning Note    Patient name Yajaira Marsh  Location [facility] 820/[facility] 820-01 MRN [id number]  : 1945 Date 2025       Current Admission Date: 2025  Current Admission Diagnosis:Pulmonary embolus (HCC)   Patient Active Problem List    Diagnosis Date Noted    Pulmonary embolus (HCC) 2025    Abnormal EKG 2025    Generalized anxiety disorder 05/15/2025    Rhinorrhea 2025    Dry mouth 2025    MCI (mild cognitive impairment) 2025    Hypokalemia 2025    Hypoglycemia 2025    Ambulatory dysfunction 2025    Essential hypertension 2025    Depressive disorder 2025    Current mild episode of major depressive disorder without prior episode (HCC) 2024    Diarrhea 10/17/2024    Encounter for screening involving social determinants of health (SDoH) 2024    Obesity (BMI 30-39.9) 10/13/2023    Kidney stone 2023    Atherosclerosis of both carotid arteries 07/10/2023    Renal insufficiency 07/10/2023    Kidney mass 2023    Fall 2023    Anxiety 2022    Osteoarthritis of spine with radiculopathy, lumbar region 2022    Lumbar radiculopathy 2022    Microalbuminuria 2021    S/P CABG (coronary artery bypass graft) 2021    Apraxia 2021    Herpes zoster without complication 2021    MADAN (generalized anxiety disorder) 2020    SARAY (obstructive sleep apnea) 2018    Allergic rhinitis 2018    Onychomycosis 10/27/2017    Lung nodule 2017    Severe episode of recurrent major depressive disorder, without psychotic features ([facility]) 2017    History of DVT (deep vein thrombosis) 2017    CAD s/p CABG 2017    Hyperlipidemia 2017    Hypothyroidism 2017    Spinal stenosis of lumbar region 2017    Vitamin B12 deficiency 2016    Diabetic polyneuropathy associated with type 2 diabetes mellitus (HCC) 10/07/2015     Vitreo-retinal adhesions 04/21/2015    Adenomatous polyp of colon 11/30/2013    Psoriasis with arthropathy (HCC) 11/05/2013    Chronic mastoiditis 06/06/2013    Vitamin D deficiency 08/31/2012    Type 2 diabetes mellitus with hyperglycemia, with long-term current use of insulin (HCC) 12/30/2010      LOS (days): 2  Geometric Mean LOS (GMLOS) (days): 2.4  Days to GMLOS:0.1     OBJECTIVE:  Risk of Unplanned Readmission Score: 35.45         Current admission status: Inpatient   Preferred Pharmacy:   [facility]/pharmacy #[id number] - [place] - [address] [address]  Phone: [phone] Fax: [phone]    Primary Care Provider: Sukumar Clemens DO    Primary Insurance: BLUE CROSS  REP  Secondary Insurance:     DISCHARGE DETAILS:        Per provider, in discussion with family, concern for limited, decreasing cognition, and increased aggression at home.  Plan to consult PT/OT/geriatrics and neuropsych- pending recs  CM following

## 2025-06-01 NOTE — PLAN OF CARE
Problem: Knowledge Deficit  Goal: Patient/family/caregiver demonstrates understanding of disease process, treatment plan, medications, and discharge instructions  Description: Complete learning assessment and assess knowledge base.  Interventions:  - Provide teaching at level of understanding  - Provide teaching via preferred learning methods  Outcome: Progressing     Problem: INFECTION - ADULT  Goal: Absence or prevention of progression during hospitalization  Description: INTERVENTIONS:  - Assess and monitor for signs and symptoms of infection  - Monitor lab/diagnostic results  - Monitor all insertion sites, i.e. indwelling lines, tubes, and drains  - Monitor endotracheal if appropriate and nasal secretions for changes in amount and color  - [place] appropriate cooling/warming therapies per order  - Administer medications as ordered  - Instruct and encourage patient and family to use good hand hygiene technique  - Identify and instruct in appropriate isolation precautions for identified infection/condition  Outcome: Progressing

## 2025-06-02 VITALS
TEMPERATURE: 98.2 F | WEIGHT: 165 LBS | OXYGEN SATURATION: 100 % | HEIGHT: 61 IN | HEART RATE: 72 BPM | BODY MASS INDEX: 31.15 KG/M2 | RESPIRATION RATE: 16 BRPM | SYSTOLIC BLOOD PRESSURE: 140 MMHG | DIASTOLIC BLOOD PRESSURE: 68 MMHG

## 2025-06-02 LAB
GLUCOSE SERPL-MCNC: 138 MG/DL (ref 65–140)
GLUCOSE SERPL-MCNC: 145 MG/DL (ref 65–140)
GLUCOSE SERPL-MCNC: 83 MG/DL (ref 65–140)
GLUCOSE SERPL-MCNC: 86 MG/DL (ref 65–140)

## 2025-06-02 PROCEDURE — 99222 1ST HOSP IP/OBS MODERATE 55: CPT | Performed by: INTERNAL MEDICINE

## 2025-06-02 PROCEDURE — 99239 HOSP IP/OBS DSCHRG MGMT >30: CPT | Performed by: INTERNAL MEDICINE

## 2025-06-02 PROCEDURE — NC001 PR NO CHARGE: Performed by: INTERNAL MEDICINE

## 2025-06-02 PROCEDURE — 97167 OT EVAL HIGH COMPLEX 60 MIN: CPT

## 2025-06-02 PROCEDURE — 97535 SELF CARE MNGMENT TRAINING: CPT

## 2025-06-02 PROCEDURE — 97530 THERAPEUTIC ACTIVITIES: CPT

## 2025-06-02 PROCEDURE — 82948 REAGENT STRIP/BLOOD GLUCOSE: CPT

## 2025-06-02 PROCEDURE — 97163 PT EVAL HIGH COMPLEX 45 MIN: CPT

## 2025-06-02 RX ORDER — FLUTICASONE PROPIONATE 50 MCG
1 SPRAY, SUSPENSION (ML) NASAL DAILY
Qty: 9.9 ML | Refills: 0 | Status: SHIPPED | OUTPATIENT
Start: 2025-06-03 | End: 2025-06-10

## 2025-06-02 RX ORDER — HYDROXYZINE HYDROCHLORIDE 25 MG/1
25 TABLET, FILM COATED ORAL EVERY 6 HOURS PRN
Qty: 30 TABLET | Refills: 0 | Status: SHIPPED | OUTPATIENT
Start: 2025-06-02 | End: 2025-06-10

## 2025-06-02 RX ORDER — QUETIAPINE FUMARATE 25 MG/1
25 TABLET, FILM COATED ORAL
Qty: 30 TABLET | Refills: 0 | Status: SHIPPED | OUTPATIENT
Start: 2025-06-02

## 2025-06-02 RX ORDER — CARVEDILOL 6.25 MG/1
6.25 TABLET ORAL 2 TIMES DAILY WITH MEALS
Qty: 60 TABLET | Refills: 0 | Status: SHIPPED | OUTPATIENT
Start: 2025-06-02

## 2025-06-02 RX ORDER — ASPIRIN 81 MG/1
81 TABLET ORAL DAILY
Qty: 30 TABLET | Refills: 0 | Status: SHIPPED | OUTPATIENT
Start: 2025-06-03

## 2025-06-02 RX ADMIN — CARVEDILOL 6.25 MG: 6.25 TABLET, FILM COATED ORAL at 17:01

## 2025-06-02 RX ADMIN — CYANOCOBALAMIN TAB 500 MCG 1000 MCG: 500 TAB at 08:09

## 2025-06-02 RX ADMIN — CARVEDILOL 6.25 MG: 6.25 TABLET, FILM COATED ORAL at 08:09

## 2025-06-02 RX ADMIN — DILTIAZEM HYDROCHLORIDE 120 MG: 120 CAPSULE, COATED, EXTENDED RELEASE ORAL at 08:09

## 2025-06-02 RX ADMIN — APIXABAN 10 MG: 5 TABLET, FILM COATED ORAL at 08:09

## 2025-06-02 RX ADMIN — SODIUM CHLORIDE 50 ML/HR: 0.9 INJECTION, SOLUTION INTRAVENOUS at 14:08

## 2025-06-02 RX ADMIN — ISOSORBIDE MONONITRATE 30 MG: 30 TABLET, EXTENDED RELEASE ORAL at 08:09

## 2025-06-02 RX ADMIN — FLUTICASONE PROPIONATE 1 SPRAY: 50 SPRAY, METERED NASAL at 08:10

## 2025-06-02 RX ADMIN — NYSTATIN: 100000 POWDER TOPICAL at 17:02

## 2025-06-02 RX ADMIN — LEVOTHYROXINE SODIUM 75 MCG: 0.07 TABLET ORAL at 05:47

## 2025-06-02 RX ADMIN — ASPIRIN 81 MG: 81 TABLET, COATED ORAL at 08:09

## 2025-06-02 RX ADMIN — APIXABAN 10 MG: 5 TABLET, FILM COATED ORAL at 17:01

## 2025-06-02 NOTE — OCCUPATIONAL THERAPY NOTE
Occupational Therapy Evaluation     Patient Name: Yajaira Marsh  Today's Date: 6/2/2025  Problem List  Principal Problem:    Pulmonary embolus (HCC)  Active Problems:    CAD s/p CABG    Hyperlipidemia    Hypothyroidism    MADAN (generalized anxiety disorder)    Type 2 diabetes mellitus with hyperglycemia, with long-term current use of insulin (HCC)    Essential hypertension    Abnormal EKG    Past Medical History  Past Medical History[1]  Past Surgical History  Past Surgical History[2]          06/02/25 1001   OT Last Visit   OT Visit Date 06/02/25   Note Type   Note type Evaluation   Pain Assessment   Pain Assessment Tool 0-10   Pain Score 3   Pain Location/Orientation Orientation: Right;Location: Shoulder   Pain Onset/Description Onset: Ongoing   Effect of Pain on Daily Activities limited ROM to reach overhead for ADLs that require that motion   Patient's Stated Pain Goal No pain   Hospital Pain Intervention(s) Repositioned;Ambulation/increased activity;Emotional support   Multiple Pain Sites No   Restrictions/Precautions   Weight Bearing Precautions Per Order No   Other Precautions Chair Alarm;Bed Alarm;Multiple lines;Fall Risk;Pain;Cognitive   Home Living   Type of Home House   Home Layout One level  (no RAFA)   Bathroom Shower/Tub Walk-in shower   Bathroom Toilet Standard   Bathroom Equipment Grab bars in shower;Built-in shower seat   Home Equipment Walker  (pt reports only using TW for community mobility)   Additional Comments 1SH; no RAFA; lives w/ spouse; uses RW for community mobility   Prior Function   Level of [place] Needs assistance with IADLS;Independent with ADLs;Independent with functional mobility   Lives With Spouse   Receives Help From Family  (from )   IADLs Family/Friend/Other provides transportation;Independent with driving;Independent with meal prep   Falls in the last 6 months 1 to 4  (pt reports around 3 falls in last 6 months)   Vocational Retired   Comments pt reports  "requiring soem assistance with ADLs/IADLs; reports spouse provides transportation and can assist PRN, but is older and may have difficulty assisting   Lifestyle   Autonomy PTA. pt was IND w/ ADLs and IADLs; -, pt reports spouse provides transportation   Reciprocal Relationships lives w/ spouse   Service to Others retired   Intrinsic Gratification enjoys watching comedy shows and reading   General   Additional Pertinent History pt reports recent onset of R shoulder pain making it difficult to lift arm above head   Family/Caregiver Present No   Subjective   Subjective \"I have been having some recent pain that has been bothering me in my right shoulder\"   ADL   Where Assessed Edge of bed   Eating Assistance 5  Supervision/Setup   Grooming Assistance 4  Minimal Assistance   UB Bathing Assistance 4  Minimal Assistance   LB Bathing Assistance 3  Moderate Assistance   UB Dressing Assistance 4  Minimal Assistance   LB Dressing Assistance 3  Moderate Assistance   Toileting Assistance  3  Moderate Assistance   Toileting Deficit Bedside commode;Perineal hygiene;Increased time to complete;Supervison/safety;Verbal cueing   Functional Assistance 4  Minimal Assistance   Bed Mobility   Supine to Sit 4  Minimal assistance   Additional items Assist x 1;HOB elevated;Bedrails;Increased time required;Verbal cues   Sit to Supine Unable to assess   Additional Comments pt ended session OOB in bedside chair; chair alarm activated   Transfers   Sit to Stand 4  Minimal assistance   Additional items Assist x 1;HOB elevated;Increased time required;Impulsive;Verbal cues   Stand to Sit 4  Minimal assistance   Additional items Assist x 1;Armrests;Increased time required;Impulsive;Verbal cues   Toilet transfer 4  Minimal assistance   Additional items Assist x 1;Increased time required;Impulsive;Verbal cues;Commode   Additional Comments pt presented w/ decreased insight into safety awareness; impulsivity w/ transfers before being educated on " safe transfer techniques; slow processing speed when being educated on safe transfer techniques; pt required repetive VC for safe transfer techniques   Functional Mobility   Functional Mobility 4  Minimal assistance   Additional Comments household distances w/ RW to bedside commode   Additional items Rolling walker   Balance   Static Sitting Fair +   Dynamic Sitting Fair   Static Standing Fair -   Dynamic Standing Poor +   Ambulatory Poor   Activity Tolerance   Activity Tolerance Patient limited by fatigue;Patient limited by pain   Medical Staff Made Aware Co-eval w/ PT 2/2 pt complexity and comorbidities   Nurse Made Aware RN cleared   RUE Assessment   RUE Assessment X  (pt presented w/ decreased AROM in R shoulder due to pain)   LUE Assessment   LUE Assessment WNL   Hand Function   Gross Motor Coordination Functional   Fine Motor Coordination Functional   Cognition   Overall Cognitive Status Impaired   Arousal/Participation Alert;Cooperative   Attention Attends with cues to redirect   Orientation Level Oriented X4  (pt originally said month was December, but quickly self-corrected to June)   Memory Decreased recall of precautions   Following Commands Follows one step commands with increased time or repetition   Comments pt was pleasant and cooperative t/o session. Pt has history of cognitive deficits and nursing has reported agitated behaviors at night time. From previous OT notes, an ACLS was requested and never completed. It would be benficial for this patient to complete an ACLS. During the session, pt presented w/ decreased insight to safety awareness and fall precautions requiring repeated VCs for proper hand placement during STS transfers w/ RW   Assessment   Limitation Decreased ADL status;Decreased UE ROM;Decreased UE strength;Decreased Safe judgement during ADL;Decreased cognition;Decreased endurance;Decreased high-level ADLs   Prognosis Good   Assessment 79 year old pt seen today for an OT evaluation  following admission to [facility] due to dyspnea, orthopnea, and pulmonary embolism in left lower lobe with present symptoms significant for pain, fatigue, weakness, decreased ADL status, decreased functional mobility. Pt has a past medical history of Acute embolism and thrombosis of unspecified deep veins of unspecified lower extremity (HCC), Anemia, Anosmia, Anxiety, Arthritis, Asthma, Back pain, Bilateral macular retinal edema, CAD (coronary artery disease), Cataract, Cervical disc herniation, Cervical radiculopathy, Cervical spinal stenosis, Cervical spondylolysis, Chronic kidney disease, Chronic mastoiditis, Colon polyp, Complex endometrial hyperplasia, Depression, Diabetes mellitus ([facility]), Disease of thyroid gland, DVT (deep venous thrombosis) ([facility]), DVT (deep venous thrombosis) ([facility]), Fibromyalgia, Fibromyalgia, primary, Hyperlipidemia, Hypertension, Hypothyroid, Kidney stone, Lumbar radiculopathy, Neck pain, Obese, PONV (postoperative nausea and vomiting), Shingles, Spinal stenosis, Stomach ulcer, Thyroid disease, Toxic metabolic encephalopathy, and Vascular claudication ([facility]). Pt lives w/ spouse in a 1SH; no RAFA; has a walk-in shower w/ grabs bars and built-in shower seat; standard toilet. PTA, pt IND w/ ADLs/IADLs; pt reports  provided transportation; -. Pt reported new onset of pain in R shoulder. Pt reported a fall onto L shoulder PTA, but is experiencing pain w/ R shoulder causing limited AROM in shoulder. Pt very pleasant and cooperative t/o session. Pt completed functional bed mobility with Min Ax1. Min Ax1 for functional STS txfs with RW. Min Ax1 for functional mobility with RW. Pt was Min A for UB ADLs and Mod A for LB ADLs. The patient's raw score on the -PAC Daily Activity Inpatient Short Form is 18. A raw score of less than 19 suggests the patient may benefit from discharge to post-acute rehabilitation services. Please refer to the recommendation of the  Occupational Therapist for safe discharge planning. Pt is functioning below baseline level of function and will continue to benefit from skilled acute OT to promote increased independence and return to PLOF. At this time, current OT recommendation is Level 2 Resources.   Goals   Patient Goals to get better   LTG Time Frame 10-14   Long Term Goal #1 see below for goals   Plan   Treatment Interventions ADL retraining;Functional transfer training;UE strengthening/ROM;Endurance training;Cognitive reorientation;Patient/family training;Energy conservation;Activityengagement   Goal Expiration Date 06/16/25   OT Frequency 2-3x/wk   Discharge Recommendation   Rehab Resource Intensity Level, OT II (Moderate Resource Intensity)   AM-PAC Daily Activity Inpatient   Lower Body Dressing 3   Bathing 3   Toileting 3   Upper Body Dressing 3   Grooming 3   Eating 3   Daily Activity Raw Score 18   Daily Activity Standardized Score (Calc for Raw Score >=11) 38.66   AM-PAC Applied Cognition Inpatient   Following a Speech/Presentation 3   Understanding Ordinary Conversation 4   Taking Medications 3   Remembering Where Things Are Placed or Put Away 3   Remembering List of 4-5 Errands 3   Taking Care of Complicated Tasks 3   Applied Cognition Raw Score 19   Applied Cognition Standardized Score 39.77   Additional Treatment Session   Start Time 0951   End Time 1001   Treatment Assessment pt participated in an additional treatment session focusing on ADLs and functional mobility. Following initial assessment, pt requested to use the bathroom on bedside commode. Pt was Min Ax1 for STS transfers w/ RW and required VCs for safe transfers w/ proper hand placement. Pt was Mod A for toileting. Pt also performed LB dressing w/ Mod A due to a bladder management incident causing socks to be changed   Additional Treatment Day 1   End of Consult   Education Provided Yes   Patient Position at End of Consult Bedside chair;Bed/Chair alarm activated;All  needs within reach   Nurse Communication Nurse aware of consult   End of Consult Comments pt ended session OOB in bedside chair; chair alarm activated; call bell provided; all needs met       Pt will be Mod I for UB dressing and bathing ADLs with use of appropriate AE PRN within this care plan.    Pt will be Mod I for LB dressing and bathing ADLs with use of appropriate AE PRN within this care plan.    Pt will be Mod I with bed mobility with good sitting balance/tolerance to engage in self care tasks within this care plan.    Pt will be Mod I in completing functional transfers with use of appropriate AD within this care plan.    Pt will participate in further functional cognitive assessments with Fair+ attention to task to assist in d/c planning and return to PLOF.    Pt will increase activity tolerance in ADLs to 30 minutes to improve occupational performance within this care plan.    Pt will improve standing tolerance to 2-3 minutes to improve occupational performance within this care plan.    Pt will improve performance in ADLs that require reaching outside [place] to PLOF/baseline to improve strength and endurance within this care plan.      DILMA Tom        [1]   Past Medical History:  Diagnosis Date    Acute embolism and thrombosis of unspecified deep veins of unspecified lower extremity (HCC)     Last Assessed:  5/18/17    Anemia     Anosmia     Anxiety     Arthritis     Asthma     Back pain     Bilateral macular retinal edema     CAD (coronary artery disease)     Cataract     Cervical disc herniation     Cervical radiculopathy     Cervical spinal stenosis     Cervical spondylolysis     Chronic kidney disease     Chronic mastoiditis     Colon polyp     Complex endometrial hyperplasia     Depression     Diabetes mellitus (HCC)     Disease of thyroid gland     DVT (deep venous thrombosis) (HCC)     DVT (deep venous thrombosis) (HCC) 06/16/2017    Fibromyalgia     Fibromyalgia, primary     Hyperlipidemia      Hypertension     Hypothyroid     Kidney stone     Lumbar radiculopathy     Neck pain     Obese     PONV (postoperative nausea and vomiting)     Shingles 07/01/2021    Spinal stenosis     Stomach ulcer     Thyroid disease     Toxic metabolic encephalopathy 02/11/2025    Vascular claudication (HCC) 12/11/2017   [2]   Past Surgical History:  Procedure Laterality Date    BACK SURGERY      CARPAL TUNNEL RELEASE      CATARACT EXTRACTION      CHOLECYSTECTOMY      COLONOSCOPY      CORONARY ANGIOPLASTY      CORONARY ARTERY BYPASS GRAFT      CYSTOSCOPY N/A 6/20/2017    Procedure: CYSTOSCOPY;  Surgeon: Tacho Arnold MD;  [facility]: [facility];  Service: Gynecology Oncology    CYSTOSCOPY      HI LAPS TOTAL HYSTERECT 250 GM/< W/RMVL TUBE/OVARY N/A 6/20/2017    Procedure: ROBOTIC HYSTERECTOMY; BILATERAL SALPINO-OOPHERECTOMY; umbilical hernia repair.;  Surgeon: Tacho Arnold MD;  Location: [facility];  Service: Gynecology Oncology    HI REPAIR FIRST ABDOMINAL WALL HERNIA N/A 5/12/2022    Procedure: REPAIR HERNIA INCISIONAL;  Surgeon: Horacio Scherer DO;  Location: [facility];  Service: General    TONSILECTOMY AND ADNOIDECTOMY      TONSILLECTOMY      UMBILICAL HERNIA REPAIR

## 2025-06-02 NOTE — CONSULTS
ASSESSMENT & PLAN: Kana Tobin is a 33 y.o. [id number] with normal gynecologic exam.    1.  Routine well woman exam done today  2. Pap and HPV:  The patient's last pap was 1/27/2022. It was normal.    Pap and cotesting was not done today. Current ASCCP Guidelines reviewed. 3.  The following were reviewed in today's visit: STD testing, family planning choices, adequate intake of calcium and vitamin D, exercise, and healthy diet. 4. Gardisil vaccine in women up to age 39 discussed and information was provided on AVS  5. Last Depo Provera was 11/7/2023, prescription renewed for the year, patient interested in [address] IUD - information given- to return to office if she desires for counseling would need to fill out paperwork for IUD through [facility]. Depression Screening Follow-up Plan: Patient's depression screening was negative  with a PHQ-2 score of 1. Their PHQ-9 score was 4. Clinically patient does not have depression. No treatment is required. BMI Counseling: Body mass index is 31.89 kg/m². The BMI is above normal. Nutrition recommendations include 3-5 servings of fruits/vegetables daily, moderation in carbohydrate intake, and reducing intake of cholesterol. Exercise recommendations include exercising 3-5 times per week. CC:  Annual Gynecologic Examination    HPI: Kana Tobin is a 33 y.o. [id number] who presents for annual gynecologic examination. Korean interpretation used  She was treated for UTI symptoms 11/7/2023 and was treated with nitrofurantoin, her urine culture came back negative. She is on Depo-Provera for contraception, has been having bleeding with use. she has the following concerns:  irregular bleeding with Depo injection    Health Maintenance:    She wears her seatbelt routinely. She does perform regular monthly self breast exams. She feels safe at home.      Past Medical History:   Diagnosis Date    Abnormal glucose affecting Consultation - Neuropsychology/Psychology Department  Yajaira Marsh 79 y.o. female MRN: [id number]  Unit/Bed#: [facility] 820-01 Encounter: [id number]        Reason for Consultation:  Yajaira Marsh is a 79 y.o. year old female who was referred for a Neuropsychological Exam to assess cognitive functioning and comment on capacity to make informed medical decisions.      History of Present Illness  Increasing dyspnea upon exertion and orthopnea, leg swelling    Physician Requesting Consult: Salvatore Mack DO    PROBLEM LIST:  Problem List[1]      Historical Information   Past Medical History[2]  Past Surgical History[3]  Social History   Social History     Substance and Sexual Activity   Alcohol Use Never     Social History     Substance and Sexual Activity   Drug Use No     Tobacco Use History[4]  Family History: Family History[5]    Meds/Allergies   current meds:   Current Facility-Administered Medications:     acetaminophen (TYLENOL) tablet 650 mg, Q4H PRN    apixaban (ELIQUIS) tablet 10 mg, BID **FOLLOWED BY** [START ON 6/8/2025] apixaban (ELIQUIS) tablet 5 mg, BID    aspirin (ECOTRIN LOW STRENGTH) EC tablet 81 mg, Daily    carvedilol (COREG) tablet 6.25 mg, BID With Meals    cyanocobalamin (VITAMIN B-12) tablet 1,000 mcg, Daily    diltiazem (CARDIZEM CD) 24 hr capsule 120 mg, Daily    fluticasone (FLONASE) 50 mcg/act nasal spray 1 spray, Daily    hydrOXYzine HCL (ATARAX) tablet 25 mg, Q6H PRN    insulin glargine (LANTUS) subcutaneous injection 25 Units 0.25 mL, HS    insulin lispro (HumALOG/ADMELOG) 100 units/mL subcutaneous injection 1-5 Units, TID AC **AND** Fingerstick Glucose (POCT), TID AC    insulin lispro (HumALOG/ADMELOG) 100 units/mL subcutaneous injection 1-5 Units, HS    isosorbide mononitrate (IMDUR) 24 hr tablet 30 mg, Daily    levothyroxine tablet 75 mcg, Early Morning    mirtazapine (REMERON) tablet 15 mg, HS    nystatin (MYCOSTATIN) powder, BID    ondansetron (ZOFRAN) injection 4 mg, Q6H PRN     QUEtiapine (SEROquel) tablet 25 mg, HS    sodium chloride 0.9 % infusion, Continuous, Last Rate: 50 mL/hr (06/01/25 1714)    Allergies[6]      Family and Social Support:   No data recorded    Behavioral Observations: Patient was alert, oriented x 3 and cooperative; affect appeared appropriate to content and patient admitted to depressed mood and anxiety; no overt evidence of psychotic process or confused thought process; patient discussed reason for hospitalization and provided medical history; states living with   and is hoping to return home    Cognitive Examination    General Cognitive Functioning MMSE = Tianfsb08/28;     Attention/Concentration Auditory Selective Attention = Average; ; Auditory Vigilance = Average; Information Processing Speed = Within Normal Limits    Frontal Systems/Executive Functioning Mental Flexibility/Cognitive Control = Borderline; Working Memory = Average Abstract Reasoning = Average; Generative Ability = Impaired,     Language Functioning Confrontation naming = Average, Phonemic Fluency = Impaired; Semantic Retrieval = Impaired; Comprehension of Complex Ideational Material = Average; Praxis = Within Normal Limits; Repetition = Within Normal Limits; Basic Reading = Within Normal Limits; Following Commands = Within Normal Limits    Memory Functioning Narrative Recall - Short Delay = Average; Long Delay Narrative Recall = Average;     Visuo-Spatial Abilities Not Assessed    Functional Knowledge  Health & Safety Knowledge = Average;     Summary/Impression:  Results of Neuropsychological Exam revealed cognitive deficits in phonemic fluency, semantic retrieval and weaknesses in mental flexibility/cognitive control with all other tested areas within normal limits. On a measure assessing awareness of personal health status and ability to evaluate health problems, handle medical emergencies and take safety precautions, patient performed within normal limits. During this encounter,  pregnancy 2022    Allergic     GBS bacteriuria 2022    Treat in labor    Varicella        Past Surgical History:   Procedure Laterality Date    NO PAST SURGERIES      TONSILLECTOMY         Past OB/Gyn History:  OB History          2    Para   2    Term   2            AB        Living   2         SAB        IAB        Ectopic        Multiple   0    Live Births   2               Pt has menstrual issues. Irregular with Depo. History of sexually transmitted infection: No.  History of abnormal pap smears: No .    Patient is currently sexually active. heterosexual.  The current method of family planning is Depo-Provera injections.     Family History   Problem Relation Age of Onset    No Known Problems Mother     No Known Problems Father     No Known Problems Brother     No Known Problems Brother     No Known Problems Daughter     No Known Problems Son     Diabetes Maternal Uncle     Breast cancer Neg Hx     Colon cancer Neg Hx     Ovarian cancer Neg Hx        Social History:  Social History     Socioeconomic History    Marital status: /Civil Union     Spouse name: Not on file    Number of children: Not on file    Years of education: Not on file    Highest education level: Not on file   Occupational History    Not on file   Tobacco Use    Smoking status: Never    Smokeless tobacco: Never   Vaping Use    Vaping Use: Never used   Substance and Sexual Activity    Alcohol use: Never    Drug use: Never    Sexual activity: Yes     Partners: Male     Birth control/protection: None, Injection   Other Topics Concern    Not on file   Social History Narrative    Not on file     Social Determinants of Health     Financial Resource Strain: Low Risk  (2023)    Overall Financial Resource Strain (CARDIA)     Difficulty of Paying Living Expenses: Not hard at all   Food Insecurity: No Food Insecurity (2023)    Hunger Vital Sign     Worried About Running Out of Food in the Last Year: Never true Ran Out of Food in the Last Year: Never true   Transportation Needs: No Transportation Needs (11/7/2023)    PRAPARE - Transportation     Lack of Transportation (Medical): No     Lack of Transportation (Non-Medical): No   Physical Activity: Insufficiently Active (1/18/2022)    Exercise Vital Sign     Days of Exercise per Week: 3 days     Minutes of Exercise per Session: 30 min   Stress: No Stress Concern Present (1/11/2022)    [address] [facility]     Feeling of Stress : Only a little   Social Connections: Moderately Integrated (1/18/2022)    Social Connection and Isolation Panel [NHANES]     Frequency of Communication with Friends and Family: Once a week     Frequency of Social Gatherings with Friends and Family: Twice a week     Attends Evangelical Services: More than 4 times per year     Active Member of [employer] or Organizations: No     Attends Club or Organization Meetings: More than 4 times per year     Marital Status: Never    Intimate Partner Violence: Not At Risk (1/11/2022)    Humiliation, Afraid, Rape, and Kick questionnaire     Fear of Current or Ex-Partner: No     Emotionally Abused: No     Physically Abused: No     Sexually Abused: No   Housing Stability: Low Risk  (11/7/2023)    Housing Stability Vital Sign     Unable to Pay for Housing in the Last Year: No     Number of Places Lived in the Last Year: 1     Unstable Housing in the Last Year: No     Presently lives with family. Patient is .   Patient is currently employed     No Known Allergies      Current Outpatient Medications:     medroxyPROGESTERone (DEPO-PROVERA) 150 mg/mL injection, Inject 1 mL (150 mg total) into a muscle every 3 (three) months, Disp: 1 mL, Rfl: 1    acetaminophen (TYLENOL) 325 mg tablet, Take 2 tablets (650 mg total) by mouth every 4 (four) hours as needed for mild pain (Patient not taking: Reported on 7/14/2022), Disp: , Rfl: 0    clotrimazole-betamethasone patient appears to have capacity to make informed medical decisons.. During this encounter, patient appears to have capacity to make informed medical decisions.            [1]   Patient Active Problem List  Diagnosis    Severe episode of recurrent major depressive disorder, without psychotic features (HCC)    History of DVT (deep vein thrombosis)    CAD s/p CABG    Hyperlipidemia    Hypothyroidism    Spinal stenosis of lumbar region    Adenomatous polyp of colon    Vitreo-retinal adhesions    Vitamin D deficiency    Vitamin B12 deficiency    Onychomycosis    Diabetic polyneuropathy associated with type 2 diabetes mellitus (HCC)    SARAY (obstructive sleep apnea)    Allergic rhinitis    MADAN (generalized anxiety disorder)    Herpes zoster without complication    Apraxia    Microalbuminuria    Lumbar radiculopathy    Psoriasis with arthropathy (HCC)    S/P CABG (coronary artery bypass graft)    Lung nodule    Osteoarthritis of spine with radiculopathy, lumbar region    Anxiety    Kidney mass    Fall    Kidney stone    Obesity (BMI 30-39.9)    Encounter for screening involving social determinants of health (SDoH)    Type 2 diabetes mellitus with hyperglycemia, with long-term current use of insulin (HCC)    Diarrhea    Current mild episode of major depressive disorder without prior episode (HCC)    Essential hypertension    Ambulatory dysfunction    Hypoglycemia    Hypokalemia    Atherosclerosis of both carotid arteries    Chronic mastoiditis    Renal insufficiency    Depressive disorder    MCI (mild cognitive impairment)    Dry mouth    Rhinorrhea    Generalized anxiety disorder    Pulmonary embolus (HCC)    Abnormal EKG   [2]   Past Medical History:  Diagnosis Date    Acute embolism and thrombosis of unspecified deep veins of unspecified lower extremity (HCC)     Last Assessed:  5/18/17    Anemia     Anosmia     Anxiety     Arthritis     Asthma     Back pain     Bilateral macular retinal edema     CAD (coronary artery  (LOTRISONE) 1-0.05 % cream, Apply topically 2 (two) times a day for 14 days Use sparingly, Disp: 30 g, Rfl: 0    docusate sodium (COLACE) 100 mg capsule, Take 1 capsule (100 mg total) by mouth 2 (two) times a day (Patient not taking: Reported on 4/15/2022), Disp: 60 capsule, Rfl: 6    fluticasone (FLONASE) 50 mcg/act nasal spray, Week 1  administer 2 sprays in each nostril per day. Week 2 administer  1 spray in each nostril daily. (Patient taking differently: Week 1  administer 2 sprays in each nostril per day. Week 2 administer  1 spray in each nostril daily.), Disp: 16 g, Rfl: 3    ibuprofen (MOTRIN) 600 mg tablet, Take 1 tablet (600 mg total) by mouth every 6 (six) hours as needed for moderate pain (Patient not taking: Reported on 7/14/2022), Disp: 30 tablet, Rfl: 0    Prenatal Vit-Fe Fumarate-FA (PRENATAL PO), Take by mouth (Patient not taking: Reported on 11/7/2023), Disp: , Rfl:     Current Facility-Administered Medications:     medroxyPROGESTERone (DEPO-PROVERA) IM injection 150 mg, 150 mg, Intramuscular, Q3 Months, Alfredo Lopez MD, 150 mg at 11/07/23 1144      Review of Systems  Constitutional :no fever, feels well, no tiredness, no recent weight gain or loss  ENT: no ear ache, no loss of hearing, no nosebleeds or nasal discharge, no sore throat or hoarseness. Cardiovascular: no complaints of slow or fast heart beat, no chest pain, no palpitations, no leg claudication or lower extremity edema. Respiratory: no complaints of shortness of shortness of breath, no MOSHER  Breasts:no complaints of breast pain, breast lump, or nipple discharge  Gastrointestinal: no complaints of abdominal pain, constipation, nausea, vomiting, or diarrhea or bloody stools  Genitourinary : no complaints of dysuria, incontinence, pelvic pain, no dysmenorrhea, vaginal discharge or abnormal vaginal bleeding and as noted in HPI.   Musculoskeletal: no complaints of arthralgia, no myalgia, no joint swelling or stiffness, no limb pain disease)     Cataract     Cervical disc herniation     Cervical radiculopathy     Cervical spinal stenosis     Cervical spondylolysis     Chronic kidney disease     Chronic mastoiditis     Colon polyp     Complex endometrial hyperplasia     Depression     Diabetes mellitus (HCC)     Disease of thyroid gland     DVT (deep venous thrombosis) (HCC)     DVT (deep venous thrombosis) (HCC) 2017    Fibromyalgia     Fibromyalgia, primary     Hyperlipidemia     Hypertension     Hypothyroid     Kidney stone     Lumbar radiculopathy     Neck pain     Obese     PONV (postoperative nausea and vomiting)     Shingles 2021    Spinal stenosis     Stomach ulcer     Thyroid disease     Toxic metabolic encephalopathy 2025    Vascular claudication ([facility]) 2017   [3]   Past Surgical History:  Procedure Laterality Date    BACK SURGERY      CARPAL TUNNEL RELEASE      CATARACT EXTRACTION      CHOLECYSTECTOMY      COLONOSCOPY      CORONARY ANGIOPLASTY      CORONARY ARTERY BYPASS GRAFT      CYSTOSCOPY N/A 2017    Procedure: CYSTOSCOPY;  Surgeon: Tacho Arnold MD;  Location: [facility];  Service: Gynecology Oncology    CYSTOSCOPY      MO LAPS TOTAL HYSTERECT 250 GM/< W/RMVL TUBE/OVARY N/A 2017    Procedure: ROBOTIC HYSTERECTOMY; BILATERAL SALPINO-OOPHERECTOMY; umbilical hernia repair.;  Surgeon: Tacho Arnold MD;  Location: [facility];  Service: Gynecology Oncology    MO REPAIR FIRST ABDOMINAL WALL HERNIA N/A 2022    Procedure: REPAIR HERNIA INCISIONAL;  Surgeon: Horacio Scherer DO;  Location[facility];  Service: General    TONSILECTOMY AND ADNOIDECTOMY      TONSILLECTOMY      UMBILICAL HERNIA REPAIR     [4]   Social History  Tobacco Use   Smoking Status Former    Current packs/day: 0.00    Average packs/day: 1 pack/day for 6.0 years (6.0 ttl pk-yrs)    Types: Cigarettes    Start date:     Quit date:     Years since quittin.4    Passive exposure: Never   Smokeless Tobacco Never    Tobacco Comments    Smoked about a half a pack for about 4 yrs.  Quite about 50 yrs ago.   [5]   Family History  Problem Relation Name Age of Onset    Arthritis Mother      Leukemia Mother      Other Mother          Anxiety, major depressive disorder, recurrent episode with atypical features    Coronary artery disease Father          Heart problem    Diabetes Father      Other Father          Infectious disease    Alzheimer's disease Maternal Grandmother      Other Maternal Grandfather          Heart problem    Other Daughter          Anxiety, major depressive disorder, recurrent episode with atypical features    Alcohol abuse Other          Grandparent    Cancer Family      Diabetes Family      Hypertension Family      Other Family          Reported prior back trouble, thyroid disorder   [6]   Allergies  Allergen Reactions    Bee Pollen Other (See Comments)    Cat Dander Other (See Comments)    Dog Epithelium (Canis Lupus Familiaris) Other (See Comments)    Molds & Smuts Allergic Rhinitis    Other Allergic Rhinitis     RAGWEED, CAT DANDER, DOG DANDER     Short Ragweed Pollen Ext Other (See Comments)    Pollen Extract Other (See Comments)     Cold symptoms        or swelling. Integumentary: no complaints of skin rash or lesion, itching or dry skin  Neurological: no complaints of headache, no confusion, no numbness or tingling, no dizziness or fainting    Objective      /65 (BP Location: Right arm, Patient Position: Standing, Cuff Size: Standard)   Pulse 96   Resp 16   Ht 5' 2.99" (1.6 m)   Wt 81.6 kg (180 lb)   LMP  (LMP Unknown)   BMI 31.89 kg/m²   General:   appears stated age, cooperative, alert normal mood and affect   Neck: normal, supple,trachea midline, no masses   Heart: regular rate and rhythm, S1, S2 normal, no murmur, click, rub or gallop   Lungs: clear to auscultation bilaterally   Breasts: normal appearance, no masses or tenderness, Inspection negative, No nipple retraction or dimpling, No nipple discharge or bleeding, No axillary or supraclavicular adenopathy, Normal to palpation without dominant masses, Taught monthly breast self examination   Abdomen: soft, non-tender, without masses or organomegaly   Vulva: normal female genitalia, Bartholin's, Urethra, [place] normal   Vagina: normal vagina, no discharge, exudate, lesion, or erythema   Urethra: normal   Cervix: Normal, no discharge. GCC done. Nontender. Uterus: normal size, contour, position, consistency, mobility, non-tender and anteverted   Adnexa: normal adnexa and no mass, fullness, tenderness   Lymphatic palpation of lymph nodes in neck, axilla, groin and/or other locations: no lymphadenopathy or masses noted   Skin normal skin turgor and no rashes.    Psychiatric orientation to person, place, and time: normal. mood and affect: normal

## 2025-06-02 NOTE — WOUND OSTOMY CARE
Consult Note - Wound   Yajaira Marsh 79 y.o. female MRN: [id number]  Unit/Bed#: [facility] 820-01 Encounter: [id number]        History and Present Illness:  Patient is a 79- yo female that was admitted to [facility] for treatment of pulmonary embolus. Patient has a PMH of CAD s/p CABG x 4, history of DVT who had previously completed anticoagulant therapy, uncontrolled type 2 diabetes on insulin therapy, hypothyroidism, hypertension, hyperlipidemia, and anxiety/depression Patient is an assist x1 for turning and repositioning. Patient is continent of bowel and incontinent of bladder. On assessment, patient is awake and agreeable to today's assessment. Patient is independent with meals and has nutritional supplements ordered twice per day.    Wound Care was consulted for maceration of pannus, groin, and breasts.    Assessment Findings:   B/L heels are dry intact and wild with no skin loss or wounds present. Recommend preventative Hydraguard Cream and proper offloading/ repositioning.      B/L sacro-buttocks is dry, intact, pink in color and blanches. No skin loss or wounds present. Recommend preventative hydragaurd to area due to incontinence.     Pannus, groin, and bilateral breasts have no open areas and no drainage. Blanchable areas of pink tissue in folds appears to be healing MASD with fungal component.. Patient has Nystatin order.    No induration, fluctuance, odor, warmth/temperature differences, redness, or purulence noted to the above noted wounds and skin areas assessed. New dressings applied per orders listed below. Patient tolerated well- no s/s of non-verbal pain or discomfort observed during the encounter. Bedside nurse aware of plan of care. See flow sheets for more detailed assessment findings.      Orders listed below and wound care will sign off, call or Secure Chat Message with questions.     Skin Care Plan:  1-Skin folds: Keep groin, breast and abdominal folds clean and dry and apply Nystatin twice  per day.   2-Turn/reposition q2h or when medically stable for pressure re-distribution on skin.  3-Elevate heels to offload pressure.  4-Moisturize skin daily with skin nourishing cream  5-Ehob cushion in chair when out of bed.  6-Preventative Hydraguard to buttocks and bilateral heels BID and PRN.                  Lakisha VAZQUEZN, RN, CWCN

## 2025-06-02 NOTE — DISCHARGE SUMMARY
"Discharge Summary - Hospitalist   Name: Yajaira Marsh 79 y.o. female I MRN: [id number]  Unit/Bed#: [facility] 820-01 I Date of Admission: 5/29/2025   Date of Service: 6/2/2025 I Hospital Day: 3     Assessment & Plan  Pulmonary embolus (HCC)  Patient presenting at the advice of her PCP with increasing dyspnea on exertion and orthopnea, also noted with leg swelling and abnormal EKG  D-dimer noted elevated during ED evaluation, subsequent CTA PE per \"V rad\" overnight read concerning for multiple segmental pulmonary emboli about the left lower lobe pulmonary arterial tree with evidence of right heart strain on CT.  Patient does report history of prior DVT for which she had completed anticoagulant course  Patient otherwise chemically stable and maintaining appropriate oxygenation on room air while at rest, troponin x 2 WNL  Started on heparin gtt. VTE protocol during ED evaluation, continue  Continue telemetry monitoring  Check bilateral lower extremity venous duplex, TTE  Eliquis is covered.  Will discharge on Eliquis  Abnormal EKG  EKG on admission demonstrated new T wave versions in numerous leads of uncertain significance.  Patient without complaints of chest pain at this time and again with troponin x 2 WNL however with new complaints of dyspnea on exertion.  Unclear EKG changes all from pulmonary embolism or if there is other cardiac process ongoing  TTE to be obtained as above, appreciate cardiology insight.  Continue telemetry monitoring  CAD s/p CABG  S/p prior CABG x 4  Continue ASA, Imdur, statin therapy.  Unclear why patient is not on a beta-blocker.  Type 2 diabetes mellitus with hyperglycemia, with long-term current use of insulin ([facility])  Lab Results   Component Value Date    HGBA1C 11.1 (H) 03/07/2025       Recent Labs     06/02/25  0629 06/02/25  0646 06/02/25  1116 06/02/25  1657   POCGLU 83 86 138 145*       Blood Sugar Average: Last 72 hrs:  (P) [id number]  Patient follows with [facility] " endocrinology.  Diabetes uncontrolled as evidenced by A1c, patient reports both hyper and hypoglycemia  Will discharge on home regimen.  Hypothyroidism  Continue PTA levothyroxine  MADAN (generalized anxiety disorder)  Additionally with history of depression, appears somewhat anxious on examination  Continue PTA mirtazapine, needs ongoing follow-up with psychiatry  Will consult geriatrics for cognitive decline and increasing restlessness and agitation particularly in the nighttime hours  Hyperlipidemia    Essential hypertension       Medical Problems       Resolved Problems  Date Reviewed: 5/2/2025   None       Discharging Physician / Practitioner: Salvatore Mack DO  PCP: Sukumar Clemens DO  Admission Date:   Admission Orders (From admission, onward)       Ordered        05/30/25 0110  INPATIENT ADMISSION  Once                          Discharge Date: 06/02/25        Hospital Course:   Yajaira Marsh is a 79 y.o. female patient who originally presented to the hospital on 5/29/2025 due to a past medical history of coronary disease with prior CABG, history of DVT, type 2 diabetes, hypothyroidism hypertension hyperlipidemia anxiety depression who presented to the hospital with symptoms of worsening dyspnea.  Patient presents for further workup and evaluation.  Imaging unfortunately showed pulmonary embolism concerning for segmental pulmonary process and left lower lobe arterial tree disease.  There was noted right heart strain on initial presentation.  She was initially placed on a heparin drip and subsequently converted to Eliquis.  She was recommended to have rehab however declines.  Patient does have capacity to make medical decisions as per neuropsych.  Patient was also seen by geriatrics.  Will follow-up as outpatient.  Will discharge on Seroquel.        Discharge Day Visit / Exam:   * Please refer to separate progress note for these details *      Discharge instructions/Information to patient and family:   See  after visit summary for information provided to patient and family.      Provisions for Follow-Up Care:  See after visit summary for information related to follow-up care and any pertinent home health orders.      Mobility at time of Discharge:   Basic Mobility Inpatient Raw Score: 14  JH-HLM Goal: 4: Move to chair/commode  JH-HLM Achieved: 5: Stand (1 or more minutes)  HLM Goal achieved. Continue to encourage appropriate mobility.     Disposition:   Home    Planned Readmission: no    Administrative Statements   Discharge Statement:  I have spent a total time of 85 minutes in caring for this patient on the day of the visit/encounter. >30 minutes of time was spent on: Impressions.    **Please Note: This note may have been constructed using a voice recognition system**

## 2025-06-02 NOTE — PHYSICAL THERAPY NOTE
Physical Therapy Evaluation     Patient's Name: Yajaira Marsh    Admitting Diagnosis  Shortness of breath [R06.02]  Pulmonary embolism (HCC) [I26.99]  T wave inversion in EKG [R94.31]    Problem List  Problem List[1]    Past Medical History  Past Medical History[2]    Past Surgical History  Past Surgical History[3]       06/02/25 1002   PT Last Visit   PT Visit Date 06/02/25   Note Type   Note type Evaluation   Pain Assessment   Pain Assessment Tool 0-10   Pain Score 3   Pain Location/Orientation Orientation: Right;Location: Shoulder   Pain Onset/Description Onset: Ongoing   Effect of Pain on Daily Activities Decreased ROM with R shoulder   Patient's Stated Pain Goal No pain   Hospital Pain Intervention(s) Repositioned;Ambulation/increased activity;Emotional support   Restrictions/Precautions   Weight Bearing Precautions Per Order No   Other Precautions Chair Alarm;Bed Alarm;Cognitive;Multiple lines;Fall Risk;Pain   Home Living   Type of Home House   Home Layout One level  (0STE)   Bathroom Shower/Tub Walk-in shower   Bathroom Toilet Standard   Bathroom Equipment Grab bars in shower;Built-in shower seat   Home Equipment Walker   Prior Function   Level of [place] Independent with functional mobility;Needs assistance with IADLS   Lives With Spouse   Receives Help From Family   IADLs Family/Friend/Other provides transportation;Family/Friend/Other provides meals;Family/Friend/Other provides medication management   Falls in the last 6 months 1 to 4   Vocational Retired   Comments Pt Ind for mobility with RW. Pt states needs some assist for ADLs/ /IADLs from spouse   General   Family/Caregiver Present No   Cognition   Overall Cognitive Status Impaired   Arousal/Participation Responsive   Attention Attends with cues to redirect   Orientation Level Oriented X4  (pt states Dec initially then self corrects to June)   Following Commands Follows one step commands with increased time or repetition   Comments Pt  cooperative and pleasant durign session. Pt does require frequent cueing and reminders for safety   Subjective   Subjective Agreeable to mobilize   RUE Assessment   RUE Assessment X  (limited ROM shoulder , overhead range)   RLE Assessment   RLE Assessment   (grossly 3+/5)   LLE Assessment   LLE Assessment   (grossly 3+/5)   Light Touch   RLE Light Touch Grossly intact   LLE Light Touch Grossly intact   Bed Mobility   Supine to Sit 4  Minimal assistance   Additional items Assist x 1;HOB elevated;Bedrails;Increased time required;Verbal cues;LE management   Sit to Supine Unable to assess   Additional Comments Pt in bed upon arrival.   Transfers   Sit to Stand 4  Minimal assistance   Additional items Assist x 1;Increased time required;Verbal cues   Stand to Sit 4  Minimal assistance   Additional items Assist x 1;Increased time required;Verbal cues;Armrests   Additional Comments w/RW- VCs for safe transfer methods, hand placement   Ambulation/Elevation   Gait pattern Wide PROSPER;Excessively slow;Step to;Short stride   Gait Assistance 4  Minimal assist   Additional items Assist x 1;Verbal cues;Tactile cues   Assistive Device Rolling walker   Distance 3 ft + 3ft   Ambulation/Elevation Additional Comments Limited ambulation distance 2* fatigue, weakness   Balance   Static Sitting Fair +   Dynamic Sitting Fair   Static Standing Fair -   Dynamic Standing Poor +   Ambulatory Poor +   Endurance Deficit   Endurance Deficit Yes   Activity Tolerance   Activity Tolerance Patient limited by fatigue;Patient limited by pain   Medical Staff Made Aware Co-eval with OT 2* medical complexity and multiple co morbidities   Nurse Made Aware RN cleared pt   Assessment   Prognosis Good   Problem List Decreased endurance;Decreased strength;Decreased range of motion;Impaired balance;Decreased mobility;Pain;Decreased safety awareness   Assessment Pt is a 79 y.o. female seen for PT evaluation s/p admit to [facility] on 5/29/2025. Pt was  admitted with a primary dx of: Pulmonary embolus, abnormal EKG.Pt has a past medical history of Acute embolism and thrombosis of unspecified deep veins of unspecified lower extremity (HCC), Anemia, Anosmia, Anxiety, Arthritis, Asthma, Back pain, Bilateral macular retinal edema, CAD (coronary artery disease), Cataract, Cervical disc herniation, Cervical radiculopathy, Cervical spinal stenosis, Cervical spondylolysis, Chronic kidney disease, Chronic mastoiditis, Colon polyp, Complex endometrial hyperplasia, Depression, Diabetes mellitus (HCC), Disease of thyroid gland, DVT (deep venous thrombosis) (HCC), DVT (deep venous thrombosis) (HCC) (06/16/2017), Fibromyalgia, Fibromyalgia, primary, Hyperlipidemia, Hypertension, Hypothyroid, Kidney stone, Lumbar radiculopathy, Neck pain, Obese, PONV (postoperative nausea and vomiting), Shingles (07/01/2021), Spinal stenosis, Stomach ulcer, Thyroid disease, Toxic metabolic encephalopathy (02/11/2025), and Vascular claudication ([facility]) (12/11/2017).    Pt with Activity as tolerated orders.Pts current clinical presentation is Unstable/ Unpredictable (high complexity) due to Ongoing medical management for primary dx, Decreased activity tolerance compared to baseline, Fall risk, Increased assistance needed from caregiver at current time, Cog status, Trending lab values. Prior to admission, pt was Ind for mobility with RW, [facility] Upon evaluation, pt currently is requiring Min A x1 for bed mobility, transfers and ambulation using RW. Pt presents at PT eval functioning below baseline and currently w/ overall mobility deficits 2* to: impaired balance, decreased endurance, gait deviations, pain, decreased activity tolerance compared to baseline, decreased functional mobility tolerance compared to baseline, fall risk. Pt will continue to benefit from skilled acute PT interventions to address stated impairments; to maximize functional mobility. At conclusion of PT session  chair alarm engaged, all needs in reach, RN notified of session findings/recommendations, and pt returned back in recliner chair with phone and call bell within reach. The patient's AM-PAC Basic Mobility Inpatient Short Form Raw Score is 14. A Raw score of less than or equal to 16 suggests the patient may benefit from discharge to post-acute rehabilitation services. Recommend Level II upon hospital D/C.   Barriers to Discharge Decreased caregiver support   Goals   Patient Goals to get better   STG Expiration Date 06/16/25   Short Term Goal #1 STG 1. Pt will be able to perform bed mobility tasks with sup in order to improve overall functional mobility and assist in safe d/c. STG 2. Pt with sit EOB for at least 25 minutes at sup level in order to strengthen abdominal musculature and assist in future transfers/ ambulation. STG 3. Pt will be able to perform functional transfer with sup in order to improve overall functional mobility and assist in safe d/c. STG 4. Pt will be able to ambulate at least 75 feet with least restrictive device with sup A in order to improve overall functional mobility and assist in safe d/c. STG 5. Pt will improve sitting/standing static/dynamic balance 1/2 grade in order to improve functional mobility and assist in safe d/c. STG 6. Pt will improve LE strength by 1/2 grade in order to improve functional mobility and assist in safe d/c.   PT Treatment Day 0   Plan   Treatment/Interventions Functional transfer training;Therapeutic exercise;Bed mobility;Gait training;Spoke to nursing;Spoke to case management;OT   PT Frequency 3-5x/wk   Discharge Recommendation   Rehab Resource Intensity Level, PT II (Moderate Resource Intensity)   Equipment Recommended Walker   AM-PAC Basic Mobility Inpatient   Turning in Flat Bed Without Bedrails 3   Lying on Back to Sitting on Edge of Flat Bed Without Bedrails 2   Moving Bed to Chair 3   Standing Up From Chair Using Arms 3   Walk in Room 2   Climb 3-5 Stairs  With Railing 1   Basic Mobility Inpatient Raw Score 14   Basic Mobility Standardized Score 35.55   [facility] Highest Level Of Mobility   -[facility] Goal 4: Move to chair/commode   -[facility] Achieved 5: Stand (1 or more minutes)   Modified [place] Scale   Modified [place] Scale 4   Additional Treatment Session   Start Time 0950   End Time 1002   Treatment Assessment Pt seen for additional treatment session this date. Pt incontiennt of bladder , required to be assisted onto commode . Pt completes 3 ft of ambulation with min assist X 1 and RW. Functional transfer from commode with min assist x 1 , requiring assist for hygiene care and linen change.   Equipment Use RW, Commode   Additional Treatment Day 1   End of Consult   Patient Position at End of Consult All needs within reach;Bed/Chair alarm activated;Bedside chair         Modesta Downing, PT DPT       [1]   Patient Active Problem List  Diagnosis    Severe episode of recurrent major depressive disorder, without psychotic features (HCC)    History of DVT (deep vein thrombosis)    CAD s/p CABG    Hyperlipidemia    Hypothyroidism    Spinal stenosis of lumbar region    Adenomatous polyp of colon    Vitreo-retinal adhesions    Vitamin D deficiency    Vitamin B12 deficiency    Onychomycosis    Diabetic polyneuropathy associated with type 2 diabetes mellitus (HCC)    SARAY (obstructive sleep apnea)    Allergic rhinitis    MADAN (generalized anxiety disorder)    Herpes zoster without complication    Apraxia    Microalbuminuria    Lumbar radiculopathy    Psoriasis with arthropathy (HCC)    S/P CABG (coronary artery bypass graft)    Lung nodule    Osteoarthritis of spine with radiculopathy, lumbar region    Anxiety    Kidney mass    Fall    Kidney stone    Obesity (BMI 30-39.9)    Encounter for screening involving social determinants of health (SDoH)    Type 2 diabetes mellitus with hyperglycemia, with long-term current use of insulin (HCC)    Diarrhea    Current mild episode of major  depressive disorder without prior episode (HCC)    Essential hypertension    Ambulatory dysfunction    Hypoglycemia    Hypokalemia    Atherosclerosis of both carotid arteries    Chronic mastoiditis    Renal insufficiency    Depressive disorder    MCI (mild cognitive impairment)    Dry mouth    Rhinorrhea    Generalized anxiety disorder    Pulmonary embolus (HCC)    Abnormal EKG   [2]   Past Medical History:  Diagnosis Date    Acute embolism and thrombosis of unspecified deep veins of unspecified lower extremity (HCC)     Last Assessed:  5/18/17    Anemia     Anosmia     Anxiety     Arthritis     Asthma     Back pain     Bilateral macular retinal edema     CAD (coronary artery disease)     Cataract     Cervical disc herniation     Cervical radiculopathy     Cervical spinal stenosis     Cervical spondylolysis     Chronic kidney disease     Chronic mastoiditis     Colon polyp     Complex endometrial hyperplasia     Depression     Diabetes mellitus (HCC)     Disease of thyroid gland     DVT (deep venous thrombosis) (HCC)     DVT (deep venous thrombosis) (HCC) 06/16/2017    Fibromyalgia     Fibromyalgia, primary     Hyperlipidemia     Hypertension     Hypothyroid     Kidney stone     Lumbar radiculopathy     Neck pain     Obese     PONV (postoperative nausea and vomiting)     Shingles 07/01/2021    Spinal stenosis     Stomach ulcer     Thyroid disease     Toxic metabolic encephalopathy 02/11/2025    Vascular claudication ([facility]) 12/11/2017   [3]   Past Surgical History:  Procedure Laterality Date    BACK SURGERY      CARPAL TUNNEL RELEASE      CATARACT EXTRACTION      CHOLECYSTECTOMY      COLONOSCOPY      CORONARY ANGIOPLASTY      CORONARY ARTERY BYPASS GRAFT      CYSTOSCOPY N/A 6/20/2017    Procedure: CYSTOSCOPY;  Surgeon: Tacho Arnold MD;  [facility];  Service: Gynecology Oncology    CYSTOSCOPY      MA LAPS TOTAL HYSTERECT 250 GM/< W/RMVL TUBE/OVARY N/A 6/20/2017    Procedure: ROBOTIC HYSTERECTOMY;  BILATERAL SALPINO-OOPHERECTOMY; umbilical hernia repair.;  Surgeon: Tacho Arnold MD;  [facility];  Service: Gynecology Oncology    IA REPAIR FIRST ABDOMINAL WALL HERNIA N/A 5/12/2022    Procedure: REPAIR HERNIA INCISIONAL;  Surgeon: Horacio Scherer DO;  Location[facility];  Service: General    TONSILECTOMY AND ADNOIDECTOMY      TONSILLECTOMY      UMBILICAL HERNIA REPAIR

## 2025-06-02 NOTE — PATIENT INSTRUCTIONS
Eat 3 regular meals ~4-5 hours apart with 1-2 snacks per day as needed.  Keep carbohydrate intake consistent. Aim for ~30 grams of carbohydrate per meal and 0-15 grams of carbohydrate per snack.  Rotate insulin injection sites.

## 2025-06-02 NOTE — DISCHARGE INSTR - OTHER ORDERS
Skin Care Plan:  1-Skin folds: Keep groin, breast and abdominal folds clean and dry and apply Nystatin twice per day.   2-Turn/reposition q2h or when medically stable for pressure re-distribution on skin.  3-Elevate heels to offload pressure.  4-Moisturize skin daily with skin nourishing cream  5-Ehob cushion in chair when out of bed.  6-Preventative Hydraguard to buttocks and bilateral heels BID and PRN.

## 2025-06-02 NOTE — PLAN OF CARE
Problem: PHYSICAL THERAPY ADULT  Goal: Performs mobility at highest level of function for planned discharge setting.  See evaluation for individualized goals.  Description: Treatment/Interventions: Functional transfer training, Therapeutic exercise, Bed mobility, Gait training, Spoke to nursing, Spoke to case management, OT  Equipment Recommended: Walker       See flowsheet documentation for full assessment, interventions and recommendations.  Note: Prognosis: Good  Problem List: Decreased endurance, Decreased strength, Decreased range of motion, Impaired balance, Decreased mobility, Pain, Decreased safety awareness  Assessment: Pt is a 79 y.o. female seen for PT evaluation s/p admit to [facility] on 5/29/2025. Pt was admitted with a primary dx of: Pulmonary embolus, abnormal EKG.Pt has a past medical history of Acute embolism and thrombosis of unspecified deep veins of unspecified lower extremity (HCC), Anemia, Anosmia, Anxiety, Arthritis, Asthma, Back pain, Bilateral macular retinal edema, CAD (coronary artery disease), Cataract, Cervical disc herniation, Cervical radiculopathy, Cervical spinal stenosis, Cervical spondylolysis, Chronic kidney disease, Chronic mastoiditis, Colon polyp, Complex endometrial hyperplasia, Depression, Diabetes mellitus ([facility]), Disease of thyroid gland, DVT (deep venous thrombosis) ([facility]), DVT (deep venous thrombosis) ([facility]) (06/16/2017), Fibromyalgia, Fibromyalgia, primary, Hyperlipidemia, Hypertension, Hypothyroid, Kidney stone, Lumbar radiculopathy, Neck pain, Obese, PONV (postoperative nausea and vomiting), Shingles (07/01/2021), Spinal stenosis, Stomach ulcer, Thyroid disease, Toxic metabolic encephalopathy (02/11/2025), and Vascular claudication ([facility]) (12/11/2017).    Pt with Activity as tolerated orders.Pts current clinical presentation is Unstable/ Unpredictable (high complexity) due to Ongoing medical management for primary dx, Decreased activity tolerance compared to  baseline, Fall risk, Increased assistance needed from caregiver at current time, Cog status, Trending lab values. Prior to admission, pt was Ind for mobility with RW, [place] [facility] Upon evaluation, pt currently is requiring Min A x1 for bed mobility, transfers and ambulation using RW. Pt presents at PT eval functioning below baseline and currently w/ overall mobility deficits 2* to: impaired balance, decreased endurance, gait deviations, pain, decreased activity tolerance compared to baseline, decreased functional mobility tolerance compared to baseline, fall risk. Pt will continue to benefit from skilled acute PT interventions to address stated impairments; to maximize functional mobility. At conclusion of PT session chair alarm engaged, all needs in reach, RN notified of session findings/recommendations, and pt returned back in recliner chair with phone and call bell within reach. The patient's AM-PAC Basic Mobility Inpatient Short Form Raw Score is 14. A Raw score of less than or equal to 16 suggests the patient may benefit from discharge to post-acute rehabilitation services. Recommend Level II upon hospital D/C.  Barriers to Discharge: Decreased caregiver support     Rehab Resource Intensity Level, PT: II (Moderate Resource Intensity)    See flowsheet documentation for full assessment.

## 2025-06-02 NOTE — CASE MANAGEMENT
Case Management Progress Note    Patient name Yajaira Marsh  Location [facility]-01 MRN [id number]  : 1945 Date 2025       LOS (days): 3  Geometric Mean LOS (GMLOS) (days): 2.4  Days to GMLOS:-1.2        OBJECTIVE:        Current admission status: Inpatient  Preferred Pharmacy:   [facility]/pharmacy #[id number] [facility] [address] [address]  Phone: [phone] Fax: [phone]    Primary Care Provider: Sukumar Clemens DO    Primary Insurance: [employer]  Secondary Insurance:     PROGRESS NOTE:  Pt is being recommended for STR. Met with pt to discuss but she is declining rehab and prefers to return home. Spoke with pt's daughter Kathryn informing of recommendations. She is not sure if STR will really help pt as pt has been to rehab previously and she felt it did not help pt that much. She is not opposed to it though. Daughter stated at baseline pt does not walk far and does need some assistance at home. Daughter is more concerned about pt's mental health. She would like update to address her concerns.

## 2025-06-02 NOTE — PLAN OF CARE
Problem: OCCUPATIONAL THERAPY ADULT  Goal: Performs self-care activities at highest level of function for planned discharge setting.  See evaluation for individualized goals.  Description: Treatment Interventions: ADL retraining, Functional transfer training, UE strengthening/ROM, Endurance training, Cognitive reorientation, Patient/family training, Energy conservation, Activityengagement          See flowsheet documentation for full assessment, interventions and recommendations.   6/2/2025 1539 by Kassidy Kaye  Note: Limitation: Decreased ADL status, Decreased UE ROM, Decreased UE strength, Decreased Safe judgement during ADL, Decreased cognition, Decreased endurance, Decreased high-level ADLs  Prognosis: Good  Assessment: 79 year old pt seen today for an OT evaluation following admission to [facility] due to dyspnea, orthopnea, and pulmonary embolism in left lower lobe with present symptoms significant for pain, fatigue, weakness, decreased ADL status, decreased functional mobility. Pt has a past medical history of Acute embolism and thrombosis of unspecified deep veins of unspecified lower extremity (HCC), Anemia, Anosmia, Anxiety, Arthritis, Asthma, Back pain, Bilateral macular retinal edema, CAD (coronary artery disease), Cataract, Cervical disc herniation, Cervical radiculopathy, Cervical spinal stenosis, Cervical spondylolysis, Chronic kidney disease, Chronic mastoiditis, Colon polyp, Complex endometrial hyperplasia, Depression, Diabetes mellitus (HCC), Disease of thyroid gland, DVT (deep venous thrombosis) (HCC), DVT (deep venous thrombosis) (HCC), Fibromyalgia, Fibromyalgia, primary, Hyperlipidemia, Hypertension, Hypothyroid, Kidney stone, Lumbar radiculopathy, Neck pain, Obese, PONV (postoperative nausea and vomiting), Shingles, Spinal stenosis, Stomach ulcer, Thyroid disease, Toxic metabolic encephalopathy, and Vascular claudication ([facility]). Pt lives w/ spouse in a 1SH; no RAFA; has  a walk-in shower w/ grabs bars and built-in shower seat; standard toilet. PTA, pt IND w/ ADLs/IADLs; pt reports  provided transportation; -. Pt reported new onset of pain in R shoulder. Pt reported a fall onto L shoulder PTA, but is experiencing pain w/ R shoulder causing limited AROM in shoulder. Pt very pleasant and cooperative t/o session. Pt completed functional bed mobility with Min Ax1. Min Ax1 for functional STS txfs with RW. Min Ax1 for functional mobility with RW. Pt was Min A for UB ADLs and Mod A for LB ADLs. The patient's raw score on the -PAC Daily Activity Inpatient Short Form is 18. A raw score of less than 19 suggests the patient may benefit from discharge to post-acute rehabilitation services. Please refer to the recommendation of the Occupational Therapist for safe discharge planning. Pt is functioning below baseline level of function and will continue to benefit from skilled acute OT to promote increased independence and return to PLOF. At this time, current OT recommendation is Level 2 Resources.     Rehab Resource Intensity Level, OT: II (Moderate Resource Intensity)

## 2025-06-02 NOTE — ASSESSMENT & PLAN NOTE
Lab Results   Component Value Date    HGBA1C 11.1 (H) 03/07/2025       Recent Labs     06/02/25  0629 06/02/25  0646 06/02/25  1116 06/02/25  1657   POCGLU 83 86 138 145*       Blood Sugar Average: Last 72 hrs:  (P) [id number]  Patient follows with [facility] endocrinology.  Diabetes uncontrolled as evidenced by A1c, patient reports both hyper and hypoglycemia  Will discharge on home regimen.

## 2025-06-02 NOTE — RESTORATIVE TECHNICIAN NOTE
Restorative Technician Note      Patient Name: Yajaira Marsh     Restorative Tech Visit Date: 06/02/25  Note Type: Mobility  Patient Position Upon Consult: Bedside chair  Activity Performed: Repositioned (Boost in Chair)  Assistive Device: Other (Comment) (HHAx2)  Patient Position at End of Consult: Bedside chair; All needs within reach; Bed/Chair alarm activated  Nurse Communication: -- (Nurse aware of consult)    Mary Gonsalves Restorative Tech               ----- Message from Shital Love sent at 6/27/2019  9:53 AM EDT -----  Regarding: DR. Angeline Boo  Contact: [phone]  Patient was not able to get her X-ray done yesterday and she would like to know if she can still go and get it and if so, when is the best time to do so.  Patient's best contact #[phone]

## 2025-06-02 NOTE — PROGRESS NOTES
"Progress Note - Hospitalist   Name: Yajaira Marsh 79 y.o. female I MRN: [id number]  [facility] Date of Admission: 5/29/2025   Date of Service: 6/2/2025 I Hospital Day: 3     Assessment & Plan  Pulmonary embolus (HCC)  Patient presenting at the advice of her PCP with increasing dyspnea on exertion and orthopnea, also noted with leg swelling and abnormal EKG  D-dimer noted elevated during ED evaluation, subsequent CTA PE per \"V rad\" overnight read concerning for multiple segmental pulmonary emboli about the left lower lobe pulmonary arterial tree with evidence of right heart strain on CT.  Patient does report history of prior DVT for which she had completed anticoagulant course  Patient otherwise chemically stable and maintaining appropriate oxygenation on room air while at rest, troponin x 2 WNL  Started on heparin gtt. VTE protocol during ED evaluation, continue  Continue telemetry monitoring  Check bilateral lower extremity venous duplex, TTE  Eventually will need anticoagulant price check.  Anticipate need for indefinite course given prior history of DVT  Abnormal EKG  EKG on admission demonstrated new T wave versions in numerous leads of uncertain significance.  Patient without complaints of chest pain at this time and again with troponin x 2 WNL however with new complaints of dyspnea on exertion.  Unclear EKG changes all from pulmonary embolism or if there is other cardiac process ongoing  TTE to be obtained as above, appreciate cardiology insight.  Continue telemetry monitoring  CAD s/p CABG  S/p prior CABG x 4  Continue ASA, Imdur, statin therapy.  Unclear why patient is not on a beta-blocker.  Type 2 diabetes mellitus with hyperglycemia, with long-term current use of insulin ([facility])  Lab Results   Component Value Date    HGBA1C 11.1 (H) 03/07/2025       Recent Labs     06/01/25  2131 06/02/25  0629 06/02/25  0646 06/02/25  1116   POCGLU 145* 83 86 138       Blood Sugar Average: Last 72 " hrs:  (P) [id number]  Patient follows with [facility] endocrinology.  Diabetes uncontrolled as evidenced by A1c, patient reports both hyper and hypoglycemia  Patient states usually takes Toujeo 50 units nightly and sliding scale insulin with meals although admits she lowers doses depend on how much she eats but cannot clarify how much she reduces her insulin regimen by and admits to inconsistent use  Given inconsistencies we will manage for now with Lantus 25 units nightly, add correctional insulin coverage with Accu-Cheks.  Adjust inpatient insulin regimen as needed  Carb controlled diet  Initiate hypoglycemia protocol  Hypothyroidism  Continue PTA levothyroxine  MADAN (generalized anxiety disorder)  Additionally with history of depression, appears somewhat anxious on examination  Continue PTA mirtazapine, needs ongoing follow-up with psychiatry  Will consult geriatrics for cognitive decline and increasing restlessness and agitation particularly in the nighttime hours  Hyperlipidemia    Essential hypertension    VTE Pharmacologic Prophylaxis:   Pharmacologic: Apixaban (Eliquis)  Mechanical VTE Prophylaxis in Place: No    Patient Centered Rounds: I have performed bedside rounds with nursing staff today.        Time Spent for Care: 1 hour.  More than 50% of total time spent on counseling and coordination of care as described above.    Current Length of Stay: 3 day(s)    Current Patient Status: Inpatient       Code Status: Level 1 - Full Code      Subjective:   Patient comfortable.    Objective:     Vitals:   Temp (24hrs), Av.9 °F (37.2 °C), Min:98.3 °F (36.8 °C), Max:99.9 °F (37.7 °C)    Temp:  [98.3 °F (36.8 °C)-99.9 °F (37.7 °C)] 98.4 °F (36.9 °C)  HR:  [66-73] 67  Resp:  [18] 18  BP: (137-159)/(60-74) 157/73  SpO2:  [96 %-99 %] 96 %  Body mass index is 31.18 kg/m².     Input and Output Summary (last 24 hours):       Intake/Output Summary (Last 24 hours) at 2025 1502  Last data filed at 2025  1436  Gross per 24 hour   Intake 1248.34 ml   Output 1250 ml   Net -1.66 ml       Physical Exam:     Physical Exam  Constitutional:       Appearance: Normal appearance.   HENT:      Head: Normocephalic and atraumatic.     Cardiovascular:      Rate and Rhythm: Normal rate and regular rhythm.   Pulmonary:      Effort: Pulmonary effort is normal.      Breath sounds: Normal breath sounds.     Neurological:      General: No focal deficit present.      Mental Status: She is alert and oriented to person, place, and time.     Psychiatric:         Mood and Affect: Mood normal.         Additional Data:     Labs:    Results from last 7 days   Lab Units 06/01/25  0651   WBC Thousand/uL 6.76   HEMOGLOBIN g/dL 10.9*   HEMATOCRIT % 34.6*   PLATELETS Thousands/uL 168   SEGS PCT % 73   LYMPHO PCT % 15   MONO PCT % 11   EOS PCT % 0     Results from last 7 days   Lab Units 06/01/25  0615 05/30/25  0559 05/29/25 2019   POTASSIUM mmol/L 3.8   < > 4.0   CHLORIDE mmol/L 110*   < > 109*   CO2 mmol/L 22   < > 22   BUN mg/dL 15   < > 20   CREATININE mg/dL 1.30   < > 1.18   CALCIUM mg/dL 9.2   < > 9.3   ALK PHOS U/L  --   --  84   ALT U/L  --   --  12   AST U/L  --   --  18    < > = values in this interval not displayed.     Results from last 7 days   Lab Units 05/29/25 2019   INR  1.01           Recent Cultures (last 7 days):           Last 24 Hours Medication List:   Current Facility-Administered Medications   Medication Dose Route Frequency Provider Last Rate    acetaminophen  650 mg Oral Q4H PRN Rob Ralph, DO      apixaban  10 mg Oral BID Salvatore Mack DO      Followed by    [START ON 6/8/2025] apixaban  5 mg Oral BID Salvatore Mack DO      aspirin  81 mg Oral Daily Rob Ralph, DO      carvedilol  6.25 mg Oral BID With Meals ALAN Thornton      cyanocobalamin  1,000 mcg Oral Daily Rob Ralph, DO      diltiazem  120 mg Oral Daily Rob Ralph, DO      fluticasone  1 spray Each Nare Daily Jolanta Constantino PA-C       hydrOXYzine HCL  25 mg Oral Q6H PRN Jolanta Constantino PA-C      insulin glargine  25 Units Subcutaneous HS Rob Ralph, DO      insulin lispro  1-5 Units Subcutaneous TID AC Rob Ralph, DO      insulin lispro  1-5 Units Subcutaneous HS Rob Ralph, DO      isosorbide mononitrate  30 mg Oral Daily Rob Ralph, DO      levothyroxine  75 mcg Oral Early Morning Rob Ralph, DO      melatonin  3 mg Oral HS Hunter Steward, DO      mirtazapine  15 mg Oral HS Rob Ralph, DO      nystatin   Topical BID Jolanta Constantino PA-C      ondansetron  4 mg Intravenous Q6H PRN Rob Ralph,       QUEtiapine  25 mg Oral HS Salvatore Mack DO          Today, Patient Was Seen By: Salvatore Mack DO    ** Please Note: Dictation voice to text software may have been used in the creation of this document. **

## 2025-06-02 NOTE — PLAN OF CARE
Problem: INFECTION - ADULT  Goal: Absence or prevention of progression during hospitalization  Description: INTERVENTIONS:  - Assess and monitor for signs and symptoms of infection  - Monitor lab/diagnostic results  - Monitor all insertion sites, i.e. indwelling lines, tubes, and drains  - Monitor endotracheal if appropriate and nasal secretions for changes in amount and color  - [place] appropriate cooling/warming therapies per order  - Administer medications as ordered  - Instruct and encourage patient and family to use good hand hygiene technique  - Identify and instruct in appropriate isolation precautions for identified infection/condition  Outcome: Progressing     Problem: SAFETY ADULT  Goal: Patient will remain free of falls  Description: INTERVENTIONS:  - Educate patient/family on patient safety including physical limitations  - Instruct patient to call for assistance with activity   - Consider consulting OT/PT to assist with strengthening/mobility based on AM PAC & JH-HLM score  - Consult OT/PT to assist with strengthening/mobility   - Keep Call bell within reach  - Keep bed low and locked with side rails adjusted as appropriate  - Keep care items and personal belongings within reach  - Initiate and maintain comfort rounds  - Make Fall Risk Sign visible to staff  - Offer Toileting every 2 Hours, in advance of need  - Initiate/Maintain alarm  - Obtain necessary fall risk management equipment:   - Apply yellow socks and bracelet for high fall risk patients  - Consider moving patient to room near nurses station  Outcome: Progressing     Problem: DISCHARGE PLANNING  Goal: Discharge to home or other facility with appropriate resources  Description: INTERVENTIONS:  - Identify barriers to discharge w/patient and caregiver  - Arrange for needed discharge resources and transportation as appropriate  - Identify discharge learning needs (meds, wound care, etc.)  - Arrange for interpretive services to assist at  discharge as needed  - Refer to Case Management Department for coordinating discharge planning if the patient needs post-hospital services based on physician/advanced practitioner order or complex needs related to functional status, cognitive ability, or social support system  Outcome: Progressing     Problem: Knowledge Deficit  Goal: Patient/family/caregiver demonstrates understanding of disease process, treatment plan, medications, and discharge instructions  Description: Complete learning assessment and assess knowledge base.  Interventions:  - Provide teaching at level of understanding  - Provide teaching via preferred learning methods  Outcome: Progressing     Problem: Nutrition/Hydration-ADULT  Goal: Nutrient/Hydration intake appropriate for improving, restoring or maintaining nutritional needs  Description: Monitor and assess patient's nutrition/hydration status for malnutrition. Collaborate with interdisciplinary team and initiate plan and interventions as ordered.  Monitor patient's weight and dietary intake as ordered or per policy. Utilize nutrition screening tool and intervene as necessary. Determine patient's food preferences and provide high-protein, high-caloric foods as appropriate.     INTERVENTIONS:  - Monitor oral intake, urinary output, labs, and treatment plans  - Assess nutrition and hydration status and recommend course of action  - Evaluate amount of meals eaten  - Assist patient with eating if necessary   - Allow adequate time for meals  - Recommend/ encourage appropriate diets, oral nutritional supplements, and vitamin/mineral supplements  - Order, calculate, and assess calorie counts as needed  - Recommend, monitor, and adjust tube feedings and TPN/PPN based on assessed needs  - Assess need for intravenous fluids  - Provide specific nutrition/hydration education as appropriate  - Include patient/family/caregiver in decisions related to nutrition  Outcome: Progressing     Problem:  CARDIOVASCULAR - ADULT  Goal: Maintains optimal cardiac output and hemodynamic stability  Description: INTERVENTIONS:  - Monitor I/O, vital signs and rhythm  - Monitor for S/S and trends of decreased cardiac output  - Administer and titrate ordered vasoactive medications to optimize hemodynamic stability  - Assess quality of pulses, skin color and temperature  - Assess for signs of decreased coronary artery perfusion  - Instruct patient to report change in severity of symptoms  Outcome: Progressing     Problem: CARDIOVASCULAR - ADULT  Goal: Absence of cardiac dysrhythmias or at baseline rhythm  Description: INTERVENTIONS:  - Continuous cardiac monitoring, vital signs, obtain 12 lead EKG if ordered  - Administer antiarrhythmic and heart rate control medications as ordered  - Monitor electrolytes and administer replacement therapy as ordered  Outcome: Progressing     Problem: RESPIRATORY - ADULT  Goal: Achieves optimal ventilation and oxygenation  Description: INTERVENTIONS:  - Assess for changes in respiratory status  - Assess for changes in mentation and behavior  - Position to facilitate oxygenation and minimize respiratory effort  - Oxygen administered by appropriate delivery if ordered  - Initiate smoking cessation education as indicated  - Encourage broncho-pulmonary hygiene including cough, deep breathe, Incentive Spirometry  - Assess the need for suctioning and aspirate as needed  - Assess and instruct to report SOB or any respiratory difficulty  - Respiratory Therapy support as indicated  Outcome: Progressing     Problem: METABOLIC, FLUID AND ELECTROLYTES - ADULT  Goal: Electrolytes maintained within normal limits  Description: INTERVENTIONS:  - Monitor labs and assess patient for signs and symptoms of electrolyte imbalances  - Administer electrolyte replacement as ordered  - Monitor response to electrolyte replacements, including repeat lab results as appropriate  - Instruct patient on fluid and nutrition as  appropriate  Outcome: Progressing     Problem: METABOLIC, FLUID AND ELECTROLYTES - ADULT  Goal: Fluid balance maintained  Description: INTERVENTIONS:  - Monitor labs   - Monitor I/O and WT  - Instruct patient on fluid and nutrition as appropriate  - Assess for signs & symptoms of volume excess or deficit  Outcome: Progressing     Problem: HEMATOLOGIC - ADULT  Goal: Maintains hematologic stability  Description: INTERVENTIONS  - Assess for signs and symptoms of bleeding or hemorrhage  - Monitor labs  - Administer supportive blood products/factors as ordered and appropriate  Outcome: Progressing

## 2025-06-02 NOTE — ASSESSMENT & PLAN NOTE
"Patient presenting at the advice of her PCP with increasing dyspnea on exertion and orthopnea, also noted with leg swelling and abnormal EKG  D-dimer noted elevated during ED evaluation, subsequent CTA PE per \"V rad\" overnight read concerning for multiple segmental pulmonary emboli about the left lower lobe pulmonary arterial tree with evidence of right heart strain on CT.  Patient does report history of prior DVT for which she had completed anticoagulant course  Patient otherwise chemically stable and maintaining appropriate oxygenation on room air while at rest, troponin x 2 WNL  Started on heparin gtt. VTE protocol during ED evaluation, continue  Continue telemetry monitoring  Check bilateral lower extremity venous duplex, TTE  Eliquis is covered.  Will discharge on Eliquis  "

## 2025-06-02 NOTE — ASSESSMENT & PLAN NOTE
Lab Results   Component Value Date    HGBA1C 11.1 (H) 03/07/2025       Recent Labs     06/01/25  2131 06/02/25  0629 06/02/25  0646 06/02/25  1116   POCGLU 145* 83 86 138       Blood Sugar Average: Last 72 hrs:  (P) [id number]  Patient follows with [facility] endocrinology.  Diabetes uncontrolled as evidenced by A1c, patient reports both hyper and hypoglycemia  Patient states usually takes Toujeo 50 units nightly and sliding scale insulin with meals although admits she lowers doses depend on how much she eats but cannot clarify how much she reduces her insulin regimen by and admits to inconsistent use  Given inconsistencies we will manage for now with Lantus 25 units nightly, add correctional insulin coverage with Accu-Cheks.  Adjust inpatient insulin regimen as needed  Carb controlled diet  Initiate hypoglycemia protocol

## 2025-06-02 NOTE — CONSULTS
"Consultation - Geriatric Medicine   Yajaira Marsh 79 y.o. female MRN: [id number]  Unit/Bed#: [facility] 820-01 Encounter: [id number]      Assessment & Plan     Acute Metabolic Encephalopathy  - Reported agitation/restlessness particularly at night  - During this admission, patient has intermittently required wrist and waist belt restraints, not just an evening but throughout the day  - Patient had become agitated and combative with staff on 5/31, refusing medications and trying to elope from the hospital.  It appears that she asked her family to bring her home and had called the police.  - She received 2 doses of IM Zyprexa 2.5 mg on 5/31, and was started on Seroquel 25 mg daily at bedtime same day  -Has required intermittent physical and chemical restraints; last physical restraints on 6/1  - Lab work so far relatively unremarkable  - No head imaging ordered  - Per patient's spouse, she has been experiencing \"sundowning\" at night for about the past year    Recommendations:  - Delirium precautions  - Control shoulder pain with scheduled Tylenol vs topical lidocaine  - Repeat TSH, as previous was elevated 2 months ago and patient reports be medication non-compliant  - Avoid first-generation antihistamines, such as Hydroxyzine, given cumulative anticholinergic effects  - Minimize use of antipsychotic medications, such as Quetiapine, given baseline cognitive impairment  - Avoid benzodiazepines  - Avoid/minimize antidepressants with strong anticholinergic activity such as amitriptyline, doxepin >6mg per day, nortriptyline, paroxetine  - Add melatonin 3mg nightly  - Consider replacing mirtazapine with trazodone 25mg nightly if do not see continued benefit of mirtazapine; can uptitrate to home dosing  - Continue Seroquel 25mg qhs  - Redirect unwanted behaviors as first line, avoid physical restraints, use chemical restraint only if all other attempts have been unsuccessful, would consider Zyprexa 2.5mg IM Q2hrs for up to 3 " "total doses, monitor for orthostatic hypotension and QTc prolongation with repeat dosing, recommend lowest dose possible for shortest duration possible   - See \"Delirium\" for further recommendations    Mild Cognitive Impairment (MCI)  -Current regimen inpatient: Seroquel 25 mg nightly, hydroxyzine 25 mg every 6 hours as needed for anxiety; Remeron 15 mg daily at bedtime  -SLUMS score 24/30 on 3/25/25 (see media)  -PMH of fall, MDD, and MADAN with panic attacks    -Head CT 3/25/25: no acute intracranial abnormality; previous imaging from 2/11/25 & 3/6/25 with similar findings  -Repeat Head CT ordered on 4/8/25 for decreased taste and head injury, however, was not yet performed  - Last physical restraints used were on 6/1; last chemical restraints on 5/31  - MoCA 6/2/2025: 24/30 (See media)  - AAO x 4 on examination; does not appear to have great insight into her medical conditions  -Previously deemed to have no capacity at prior hospitalizations  - Appreciate neuropsych recommendations    Recurrent major depressive disorder  -Home regimen: Remeron 15 mg daily at bedtime, Effexor 75mg daily, Trazodone 50mg daily at bedtime, Xanax 0.5mg BID PRN  -Compliance to home regimen: Reports that she had stopped taking all of her home medications apart from mirtazapine  -Current inpatient regimen: Mirtazapine 15 mg daily at bedtime; Seroquel 25 daily bedtime; as needed Atarax 25 mg every 6 hours for anxiety  -Recent medication changes: Discharged from [facility] in March on Effexor XR 75 mg daily, trazodone 50 mg nightly, melatonin 3 mg nightly; Xanax was stopped at that time  -One prior psychiatric admissions from 3/25/25-3/28/25  -No prior SA, no h/o self-injurious behavior   -Previously in outpatient psychiatric care at [facility] ([id number]),    - She had presented to the ED on 3/23/25 with increased anxiety, insomnia and depression. The patient signed 201 and was admitted to the inpatient psychiatry unit for further psychiatric " "stabilization.    - Flat affect on examination    MADAN (generalized anxiety disorder)  -See \"Recurrent major depressive disorder\" for more history    Delirium precautions  -Patient is high risk of delirium due to acute PE, MDD, MADAN, MCI, hospital stay, medication noncompliance  -Initiate delirium precautions  -maintain normal sleep/wake cycle  -minimize overnight interruptions, group overnight vitals/labs/nursing checks as possible  -dim lights, close blinds and turn off tv to minimize stimulation and encourage sleep environment in evenings  -ensure that pain is well controlled consider Tylenol 975mg Q8H scheduled if not already ordered and needed  -monitor for fecal and urinary retention which may precipitate delirium  -encourage early mobilization and ambulation  -provide frequent reorientation and redirection  -encourage family and friends at the bedside to help help calm patient if anxious  -avoid medications which may precipitate or worsen delirium such as tramadol, benzodiazepine, anticholinergics, and benadryl  -encourage hydration and nutrition   -redirect unwanted behaviors as first line, avoid physical restraints, use chemical restraint only if all other attempts have been unsuccessful, would consider Zyprexa 2.5mg IM Q, monitor for orthostatic hypotension and QTc prolongation with repeat dosing, recommend lowest dose possible for shortest duration possible     Glasses: Distance and near vision; has with her; she notes that her vision has worsened somewhat since last prescription change  Hearing Aid: Denies  Dentures: Full dentures; has with her  Incontinence: Denies  Falls: last reported fall was several months ago; reports it was mechanical  Fecal/Urinary retention: Denies    Impaired Vision  -recommend use of corrective lenses at all appropriate times  -encourage adequate lighting and encourage use of assistance with ambulation  -keep personal belongings close to person to avoid reaching  -encourage " appropriate footwear at all times          Home medication review  Eliquis 10mg BID x 7 days then 5mg BID  Levo 75mcg every morning  Remeron 15qhs  Toujeo 5u every morning  Imdur  Clarinex daily  Pantoprazole daily  Lantus 7u nightly  Diltiazem 120mg daily  Asa 81mg daily  B12 1000u daily  D 2000u daily  Trazodone 50mg nightly  Venlafaxine 75mg daily  Pravastatin 40mg daily  Xanax 0.5mg bid prn  Novolog   Crestor  Flonase daily as needed    Personally confirmed with home pharmacy and spouse.     History of Present Illness   Physician Requesting Consult: Salvatore Mack DO  Reason for Consult / Principal Problem: Agitation/restlessness/sundowning  Hx and PE limited by: Poor insight to medical conditions  Additional history obtained from: Chart review and patient evaluation      HPI: Yajaira Marsh is a 79 y.o. year old female who was admitted to [facility] under [facility] service for pulmonary embolus.  Geriatrics was consulted for MDD, agitation, and sundowning.  She has a PMH of MADAN, MDD, MCI with UMS 24/30 in March 2025.  She does have a history of MDD, MADAN previously on Effexor XR 75 mg daily, trazodone 50 mg nightly, melatonin 3 mg nightly however she had self discontinued these medications.  She only remained on mirtazapine 15 mg daily at bedtime.  Patient's  Ed, reports that the patient had been more agitated particularly at night for about the past year.  She had a recent U admission in March 2025 and was discharged on the above medications.  Per Ed, her sundowning/agitation had greatly improved on this regimen, however since discontinuing these medications these concerns have returned.  During her current admission, she is required wrist and waist belt restraints as well as chemical restraints due to attempts to elope from the hospital and becoming combative.    On meeting the patient, she is AAO x 4 but does have limited medical insight.  She is not aware of why she is at the hospital at this time.  She  denies any SI/HI.  She does repeatedly state that she wishes to go home.  Her primary contact is her  Ed.  Secondary contact is her daughter Kathryn.    Inpatient consult to Gerontology  Consult performed by: Marla Cha DO  Consult ordered by: Salvatore Mack DO          Review of Systems   Constitutional:  Negative for activity change, appetite change, chills, fatigue and fever.   Eyes:  Negative for visual disturbance.   Respiratory:  Negative for cough, chest tightness and shortness of breath.    Cardiovascular:  Negative for chest pain, palpitations and leg swelling.   Gastrointestinal:  Negative for abdominal distention, abdominal pain, diarrhea, nausea and vomiting.   Genitourinary:  Negative for difficulty urinating, dysuria, flank pain, frequency, hematuria, pelvic pain and urgency.   Musculoskeletal:  Positive for arthralgias (Left shoulder pain; chronic). Negative for back pain and myalgias.   Skin:  Negative for color change and rash.   Neurological:  Negative for dizziness, seizures, syncope, speech difficulty, weakness, light-headedness and headaches.   Psychiatric/Behavioral:  Positive for agitation, dysphoric mood and sleep disturbance. Negative for hallucinations, self-injury and suicidal ideas. The patient is nervous/anxious.        Historical Information   Past Medical History[1]  Past Surgical History[2]  Social History   Social History     Substance and Sexual Activity   Alcohol Use Never     Social History     Substance and Sexual Activity   Drug Use No     Tobacco Use History[3]  Family history: mother -- possibly diagnosed with dementia    Meds/Allergies   all current active meds have been reviewed    Allergies[4]    Objective     Intake/Output Summary (Last 24 hours) at 6/2/2025 1100  Last data filed at 6/2/2025 0601  Gross per 24 hour   Intake 1349.3 ml   Output 850 ml   Net 499.3 ml     Invasive Devices       Peripheral Intravenous Line  Duration             Peripheral IV 05/29/25  Right;Ventral (anterior) Forearm 3 days                    Physical Exam  Vitals and nursing note reviewed.   Constitutional:       Appearance: Normal appearance.   HENT:      Head: Normocephalic and atraumatic.      Right Ear: External ear normal.      Left Ear: External ear normal.      Nose: Nose normal.      Mouth/Throat:      Mouth: Mucous membranes are moist.      Pharynx: Oropharynx is clear.     Eyes:      Extraocular Movements: Extraocular movements intact.      Conjunctiva/sclera: Conjunctivae normal.      Pupils: Pupils are equal, round, and reactive to light.       Cardiovascular:      Rate and Rhythm: Normal rate and regular rhythm.      Heart sounds: Normal heart sounds.   Pulmonary:      Effort: Pulmonary effort is normal.   Abdominal:      General: Abdomen is flat. There is no distension.      Palpations: Abdomen is soft.      Tenderness: There is no abdominal tenderness. There is no guarding or rebound.     Musculoskeletal:      Cervical back: Normal range of motion.      Right lower leg: No edema.      Left lower leg: No edema.     Skin:     General: Skin is warm and dry.      Capillary Refill: Capillary refill takes less than 2 seconds.     Neurological:      Mental Status: She is alert and oriented to person, place, and time.     Psychiatric:         Mood and Affect: Mood is anxious and depressed. Affect is flat.         Behavior: Behavior is not agitated, aggressive or hyperactive. Behavior is cooperative.         Thought Content: Thought content is not paranoid or delusional. Thought content does not include homicidal or suicidal ideation. Thought content does not include homicidal or suicidal plan.         Lab Results:   I have personally reviewed pertinent lab results including the following:  -  labs within normal limits    I have personally reviewed the following imaging study reports in PACS:  - N/A    Personal interpretation of imaging studies mentioned above:    - N/A    Therapies:   PT:  ordered by primary team  OT: ordered by primary team    VTE Prophylaxis: VTE covered by:  apixaban, Oral, 10 mg at 06/02/25 0809   In followed-by linked group with  [START ON 6/8/2025] apixaban, Oral        Code Status: Level 1 - Full Code  Advance Directive and Living Will:      Power of :    POLST:      Family and Social Support: Spouse and son and daughter  No data recorded    Goals of Care: Continue full care measures         [1]   Past Medical History:  Diagnosis Date    Acute embolism and thrombosis of unspecified deep veins of unspecified lower extremity (HCC)     Last Assessed:  5/18/17    Anemia     Anosmia     Anxiety     Arthritis     Asthma     Back pain     Bilateral macular retinal edema     CAD (coronary artery disease)     Cataract     Cervical disc herniation     Cervical radiculopathy     Cervical spinal stenosis     Cervical spondylolysis     Chronic kidney disease     Chronic mastoiditis     Colon polyp     Complex endometrial hyperplasia     Depression     Diabetes mellitus (HCC)     Disease of thyroid gland     DVT (deep venous thrombosis) (HCC)     DVT (deep venous thrombosis) (HCC) 06/16/2017    Fibromyalgia     Fibromyalgia, primary     Hyperlipidemia     Hypertension     Hypothyroid     Kidney stone     Lumbar radiculopathy     Neck pain     Obese     PONV (postoperative nausea and vomiting)     Shingles 07/01/2021    Spinal stenosis     Stomach ulcer     Thyroid disease     Toxic metabolic encephalopathy 02/11/2025    Vascular claudication (HCC) 12/11/2017   [2]   Past Surgical History:  Procedure Laterality Date    BACK SURGERY      CARPAL TUNNEL RELEASE      CATARACT EXTRACTION      CHOLECYSTECTOMY      COLONOSCOPY      CORONARY ANGIOPLASTY      CORONARY ARTERY BYPASS GRAFT      CYSTOSCOPY N/A 6/20/2017    Procedure: CYSTOSCOPY;  Surgeon: Tacho Arnold MD;  [facility]: [facility];  Service: Gynecology Oncology    CYSTOSCOPY      WA LAPS TOTAL HYSTERECT 250 GM/< W/RMVL  TUBE/OVARY N/A 2017    Procedure: ROBOTIC HYSTERECTOMY; BILATERAL SALPINO-OOPHERECTOMY; umbilical hernia repair.;  Surgeon: Tacho Arnold MD;  [facility];  Service: Gynecology Oncology    AL REPAIR FIRST ABDOMINAL WALL HERNIA N/A 2022    Procedure: REPAIR HERNIA INCISIONAL;  Surgeon: Horacio Scherer DO;  Location[facility];  Service: General    TONSILECTOMY AND ADNOIDECTOMY      TONSILLECTOMY      UMBILICAL HERNIA REPAIR     [3]   Social History  Tobacco Use   Smoking Status Former    Current packs/day: 0.00    Average packs/day: 1 pack/day for 6.0 years (6.0 ttl pk-yrs)    Types: Cigarettes    Start date:     Quit date:     Years since quittin.4    Passive exposure: Never   Smokeless Tobacco Never   Tobacco Comments    Smoked about a half a pack for about 4 yrs.  Quite about 50 yrs ago.   [4]   Allergies  Allergen Reactions    Bee Pollen Other (See Comments)    Cat Dander Other (See Comments)    Dog Epithelium (Canis Lupus Familiaris) Other (See Comments)    Molds & Smuts Allergic Rhinitis    Other Allergic Rhinitis     RAGWEED, CAT DANDER, DOG DANDER     Short Ragweed Pollen Ext Other (See Comments)    Pollen Extract Other (See Comments)     Cold symptoms

## 2025-06-03 ENCOUNTER — TELEPHONE (OUTPATIENT)
Dept: CARDIOLOGY CLINIC | Facility: CLINIC | Age: 80
End: 2025-06-03

## 2025-06-03 ENCOUNTER — PATIENT OUTREACH (OUTPATIENT)
Dept: CASE MANAGEMENT | Facility: OTHER | Age: 80
End: 2025-06-03

## 2025-06-03 ENCOUNTER — TRANSITIONAL CARE MANAGEMENT (OUTPATIENT)
Dept: INTERNAL MEDICINE CLINIC | Facility: CLINIC | Age: 80
End: 2025-06-03

## 2025-06-03 ENCOUNTER — TELEPHONE (OUTPATIENT)
Age: 80
End: 2025-06-03

## 2025-06-03 NOTE — TELEPHONE ENCOUNTER
----- Message from ALAN Thornton sent at 5/30/2025  1:44 PM EDT -----  Regarding: establish care  Patient of Dr. Quinn's would like to establish care with [facility] now that he has retired. Please call her or spouse to arrange appointment. Thanks.

## 2025-06-03 NOTE — PROGRESS NOTES
Outpatient Care Management note- follow up call, ADT alert- inpatient d/c    Chart review completed    Received notification that the patient was discharged from [facility] on 6/2/25 after admission for PE.     Call placed to the patient using preferred number listed. Call was picked up and then hung up. There is no alternative number listed.    Will attempt a 2nd call next week. Previous outreach to the patient has been the same as above. An unable to reach letter was sent on 5/29/25.

## 2025-06-03 NOTE — TELEPHONE ENCOUNTER
6/3 LVM for patient to be transfered to the [facility] to schedule a New Patient appointment

## 2025-06-03 NOTE — TELEPHONE ENCOUNTER
Contacted patient in regards to scheduling TORITO in [facility]. Someone answered and then disconnected when notified where calling from      Patient will be TORITO from [facility]   Any day after 6/30, 2np per day, 1 in am and 1 in pm, 90 min TORITO  Transfer call or Route encounter to writer

## 2025-06-03 NOTE — UTILIZATION REVIEW
NOTIFICATION OF ADMISSION DISCHARGE   This is a Notification of Discharge from [facility]. Please be advised that this patient has been discharge from our facility. Below you will find the admission and discharge date and time including the patient’s disposition.   UTILIZATION REVIEW CONTACT:  Utilization Review Assistants  Network Utilization Review Department  Phone: [phone] x carefully listen to the prompts. All voicemails are confidential.  Email: [email or web address]     ADMISSION INFORMATION  PRESENTATION DATE: 5/29/2025  6:02 PM  OBERVATION ADMISSION DATE: N/A  INPATIENT ADMISSION DATE: 5/30/25  1:10 AM   DISCHARGE DATE: 6/2/2025  6:16 PM   DISPOSITION:Home/Self Care    Network Utilization Review Department  ATTENTION: Please call with any questions or concerns to [phone] and carefully listen to the prompts so that you are directed to the right person. All voicemails are confidential.   For Discharge needs, contact Care Management DC Support Team at [phone] opt. 2  Send all requests for admission clinical reviews, approved or denied determinations and any other requests to dedicated fax number below belonging to the campus where the patient is receiving treatment. List of dedicated fax numbers for the Facilities:  FACILITY NAME UR FAX NUMBER   ADMISSION DENIALS (Administrative/Medical Necessity) [phone]   DISCHARGE SUPPORT TEAM ([facility]) [phone]   PARENT CHILD HEALTH (Maternity/NICU/Pediatrics) [phone]   [facility] [phone]   [facility] [phone]   [facility] [phone]   [facility] [phone]   [facility] [phone]   [facility] [phone]   [facility] [phone]   [facility] [phone]   [facility]  [facility] [phone]   [facility] [phone]   [facility] [phone]   [facility] [phone]

## 2025-06-03 NOTE — TELEPHONE ENCOUNTER
6/3 LVM for patient to be transfered to the [facility] to schedule a New Patient appointment

## 2025-06-04 ENCOUNTER — NURSE TRIAGE (OUTPATIENT)
Dept: OTHER | Facility: OTHER | Age: 80
End: 2025-06-04

## 2025-06-04 ENCOUNTER — TELEPHONE (OUTPATIENT)
Age: 80
End: 2025-06-04

## 2025-06-04 ENCOUNTER — OFFICE VISIT (OUTPATIENT)
Dept: INTERNAL MEDICINE CLINIC | Facility: CLINIC | Age: 80
End: 2025-06-04
Payer: COMMERCIAL

## 2025-06-04 VITALS
SYSTOLIC BLOOD PRESSURE: 130 MMHG | BODY MASS INDEX: 29.68 KG/M2 | HEART RATE: 74 BPM | WEIGHT: 157.2 LBS | DIASTOLIC BLOOD PRESSURE: 82 MMHG | OXYGEN SATURATION: 100 % | HEIGHT: 61 IN

## 2025-06-04 DIAGNOSIS — I26.99 OTHER ACUTE PULMONARY EMBOLISM, UNSPECIFIED WHETHER ACUTE COR PULMONALE PRESENT (HCC): Primary | ICD-10-CM

## 2025-06-04 DIAGNOSIS — Z79.4 TYPE 2 DIABETES MELLITUS WITH HYPERGLYCEMIA, WITH LONG-TERM CURRENT USE OF INSULIN (HCC): Chronic | ICD-10-CM

## 2025-06-04 DIAGNOSIS — G31.84 MCI (MILD COGNITIVE IMPAIRMENT): ICD-10-CM

## 2025-06-04 DIAGNOSIS — E11.65 TYPE 2 DIABETES MELLITUS WITH HYPERGLYCEMIA, WITH LONG-TERM CURRENT USE OF INSULIN (HCC): Chronic | ICD-10-CM

## 2025-06-04 PROCEDURE — 99496 TRANSJ CARE MGMT HIGH F2F 7D: CPT | Performed by: INTERNAL MEDICINE

## 2025-06-04 NOTE — PROGRESS NOTES
"Transition of Care Visit:  Name: Yajaira Marsh      : 1945      MRN: [id number]  Encounter Provider: Sukumar Clemens DO  Encounter Date: 2025   Encounter department: [facility]  ssessment & Plan  Pulmonary embolus (HCC)  Patient presenting at the advice of her PCP with increasing dyspnea on exertion and orthopnea, also noted with leg swelling and abnormal EKG  D-dimer noted elevated during ED evaluation, subsequent CTA PE per \"V rad\" overnight read concerning for multiple segmental pulmonary emboli about the left lower lobe pulmonary arterial tree with evidence of right heart strain on CT.  Patient does report history of prior DVT for which she had completed anticoagulant course  Patient otherwise chemically stable and maintaining appropriate oxygenation on room air while at rest, troponin x 2 WNL  Started on heparin gtt. VTE protocol during ED evaluation, continue  Continue telemetry monitoring  Check bilateral lower extremity venous duplex, TTE  Eliquis is covered.  Will discharge on Eliquis  Abnormal EKG  EKG on admission demonstrated new T wave versions in numerous leads of uncertain significance.  Patient without complaints of chest pain at this time and again with troponin x 2 WNL however with new complaints of dyspnea on exertion.  Unclear EKG changes all from pulmonary embolism or if there is other cardiac process ongoing  TTE to be obtained as above, appreciate cardiology insight.  Continue telemetry monitoring  CAD s/p CABG  S/p prior CABG x 4  Continue ASA, Imdur, statin therapy.  Unclear why patient is not on a beta-blocker.  Type 2 diabetes mellitus with hyperglycemia, with long-term current use of insulin ([facility])        Lab Results   Component Value Date     HGBA1C 11.1 (H) 2025         Assessment & Plan  Other acute pulmonary embolism, unspecified whether acute cor pulmonale present (HCC)  Transition care management visit regarding recent " hospitalization exertion, orthopnea leg swelling abnormal EKG.  The patient was seen in the ER elevated D-dimer CTA PE study showed multiple segmental pulmonary embolism left lower lobe pulmonary artery tree with evidence of right heart strain on CAT scan.  She has a prior history of DVT but has completed the anticoagulation course.  Today we did review the discharge summary laboratories and test completed during the hospitalization she continues have ongoing dyspnea on exertion pulse ox is stable I suspect she also has a component of anxiety and recent pulmonary embolism or contributory I will have her see pulmonologist, she has not taken her hydroxyzine in several days now appears to be panicky she will  the prescription and take the hydroxyzine upon arrival home she will work with her counselor who has been helping her.  Did explain to the patient the importance of taking the Eliquis compliantly and possibly setting up a pillbox and a good system to help her remember to take her medications.  She is also going to geriatrics during the hospitalization there was some sundowning and confusion suspect early dementia will have patient seen by geriatrics.  Will have patient see pulmonary check CMP and CBC  Orders:  •  Ambulatory Referral to Pulmonology; Future  •  Comprehensive metabolic panel; Future  •  CBC (Includes Diff/Plt) (Refl); Future    Type 2 diabetes mellitus with hyperglycemia, with long-term current use of insulin (HCC)  Uncontrolled advised the patient to check blood sugars via fingerstick to report to me the sensor is not being covered by insurance I would like her to see her endocrinologist Dr. Pope    Lab Results   Component Value Date    HGBA1C 11.1 (H) 03/07/2025       Orders:  •  Ambulatory Referral to Endocrinology; Future    MCI (mild cognitive impairment)  Patient did undergo neurocognitive testing during the hospitalization she is competent to make decisions, she will be  following up with geriatrics for further evaluation she did develop some agitation confusion and sundowning during the hospitalization?  Early dementia versus MCI.  I did talk to the patient about long-term planning and possible placement for independent living I offered them to meet with the .  They would like to discuss this with geriatrics further and work with the geriatrics case management team.  The  does explain to me it is a complex situation and that they have a lot of household items throughout the years but they have accumulated I would need to go through before they can transition.            History of Present Illness     Transitional Care Management Review:   Yajaira Marsh is a 79 y.o. female here for TCM follow up.  Regarding pulmonary embolism, type 2 diabetes, anxiety cognitive difficulties/restlessness/agitation/sundowning in the evening in the hospital.  Patient reports he may she forgot to take one of her Eliquis yesterday she has not been taking the Atarax for several days he has been feeling more anxious and panicky no SI she will be picking up her medicine and taking the Atarax upon arrival home.  She will be following up with geriatrics in regards to the sundowning and confusion agitation she was evaluated by neuropsychology and competent to make decisions.  She has ongoing chronic dyspnea on exertion since developing the pulmonary embolism she reports me when she is anxious increase symptoms she has been feeling more anxious and has not taken her Atarax in several days    During the TCM phone call patient stated:  TCM Call (since 5/22/2025)     Date and time call was made  6/3/2025 10:11 AM    Hospital care reviewed  Records reviewed    Patient was hospitialized at  [facility]    Date of Admission  05/29/25    Date of discharge  06/02/25    Diagnosis  Pulmonary embolus (HCC)    Disposition  Home    Were the patients medications reviewed and updated  No     "  TCM Call (since 5/22/2025)     Post hospital issues  None    Scheduled for follow up?  Yes    Did you obtain your prescribed medications  Yes    Do you need help managing your prescriptions or medications  No    Is transportation to your appointment needed  No    I have advised the patient to call PCP with any new or worsening symptoms  Blanco Love - /[place]    Are you recieving home care services  No        HPI  Review of Systems   Constitutional:  Negative for activity change, appetite change and unexpected weight change.   HENT:  Negative for congestion and postnasal drip.    Eyes:  Negative for visual disturbance.   Respiratory:  Positive for shortness of breath. Negative for cough.    Cardiovascular:  Negative for chest pain.   Gastrointestinal:  Negative for abdominal pain, diarrhea, nausea and vomiting.   Neurological:  Negative for dizziness, light-headedness and headaches.   Hematological:  Negative for adenopathy.   Psychiatric/Behavioral:  Negative for suicidal ideas. The patient is nervous/anxious.      Objective   /82 (BP Location: Left arm, Patient Position: Sitting, Cuff Size: Standard)   Pulse 74   Ht 5' 1\" (1.549 m)   Wt 71.3 kg (157 lb 3.2 oz)   SpO2 100%   BMI 29.70 kg/m²     Physical Exam  Vitals and nursing note reviewed.   Constitutional:       General: She is not in acute distress.     Appearance: Normal appearance. She is well-developed. She is obese. She is not ill-appearing, toxic-appearing or diaphoretic.   HENT:      Head: Normocephalic and atraumatic.      Right Ear: External ear normal.      Left Ear: External ear normal.      Nose: Nose normal.     Eyes:      Pupils: Pupils are equal, round, and reactive to light.       Cardiovascular:      Rate and Rhythm: Normal rate and regular rhythm.      Heart sounds: Normal heart sounds. No murmur heard.  Pulmonary:      Effort: Pulmonary effort is normal.      Breath sounds: Normal breath sounds.   Abdominal:      General: There " is no distension.      Palpations: Abdomen is soft.      Tenderness: There is no abdominal tenderness. There is no guarding.     Psychiatric:         Mood and Affect: Mood is anxious. Mood is not depressed.         Thought Content: Thought content does not include suicidal ideation.     Appears to be nervous she is pacing the room

## 2025-06-04 NOTE — TELEPHONE ENCOUNTER
Carlton , clinical  on care management team-  [facility] called to request discharge summary faxed to [phone]- so a nurse can reach out for the transition of care program.  Any questions her phone direct line is [phone] ext [phone]    She also wanted to advise the Primary Care Provider she is due for:    screening for hypertension  and kidney health evaluation

## 2025-06-04 NOTE — ASSESSMENT & PLAN NOTE
Uncontrolled advised the patient to check blood sugars via fingerstick to report to me the sensor is not being covered by insurance I would like her to see her endocrinologist Dr. Pope    Lab Results   Component Value Date    HGBA1C 11.1 (H) 03/07/2025       Orders:  •  Ambulatory Referral to Endocrinology; Future

## 2025-06-04 NOTE — ASSESSMENT & PLAN NOTE
Transition care management visit regarding recent hospitalization exertion, orthopnea leg swelling abnormal EKG.  The patient was seen in the ER elevated D-dimer CTA PE study showed multiple segmental pulmonary embolism left lower lobe pulmonary artery tree with evidence of right heart strain on CAT scan.  She has a prior history of DVT but has completed the anticoagulation course.  Today we did review the discharge summary laboratories and test completed during the hospitalization she continues have ongoing dyspnea on exertion pulse ox is stable I suspect she also has a component of anxiety and recent pulmonary embolism or contributory I will have her see pulmonologist, she has not taken her hydroxyzine in several days now appears to be panicky she will  the prescription and take the hydroxyzine upon arrival home she will work with her counselor who has been helping her.  Did explain to the patient the importance of taking the Eliquis compliantly and possibly setting up a pillbox and a good system to help her remember to take her medications.  She is also going to geriatrics during the hospitalization there was some sundowning and confusion suspect early dementia will have patient seen by geriatrics.  Will have patient see pulmonary check CMP and CBC  Orders:  •  Ambulatory Referral to Pulmonology; Future  •  Comprehensive metabolic panel; Future  •  CBC (Includes Diff/Plt) (Refl); Future

## 2025-06-05 NOTE — TELEPHONE ENCOUNTER
Please see patients question and advise.      Patient has a question regarding her hydroxyzine and is concerned w/ being over the age of 65.

## 2025-06-05 NOTE — ASSESSMENT & PLAN NOTE
Patient did undergo neurocognitive testing during the hospitalization she is competent to make decisions, she will be following up with geriatrics for further evaluation she did develop some agitation confusion and sundowning during the hospitalization?  Early dementia versus MCI.  I did talk to the patient about long-term planning and possible placement for independent living I offered them to meet with the .  They would like to discuss this with geriatrics further and work with the geriatrics case management team.  The  does explain to me it is a complex situation and that they have a lot of household items throughout the years but they have accumulated I would need to go through before they can transition.

## 2025-06-05 NOTE — TELEPHONE ENCOUNTER
"REASON FOR CONVERSATION: Medication Management    SYMPTOMS: Medication questions regarding low dose OTC aspirin safety  and Hydroxyzine usage after the age of 65    OTHER HEALTH INFORMATION:     PROTOCOL DISPOSITION: Home Care (Information or Advice Only Call)    CARE ADVICE PROVIDED: Gopi low dose Aspirin 81 mg  OTC can be compared to the Aspirin 81 mg prescribed that is not covered by insurance due to medication comparison is sold OTC. Pt verbalized understanding.     PRACTICE FOLLOW-UP: Please call pt regarding her hydroxyzine concern and being over the age of 65.      Answer Assessment - Initial Assessment Questions  1. NAME of MEDICINE: \"What medicine(s) are you calling about?\"      Aspirin 81 mg vs Gopi low dose  aspirin 81 mg   2. QUESTION: \"What is your question?\" (e.g., double dose of medicine, side effect)      Can I use OTC gopi 81 mg low dose aspirin ? My insurance will not pay for the prescribed 81 mg aspirin.  3. PRESCRIBER: \"Who prescribed the medicine?\" Reason: if prescribed by specialist, call should be referred to that group.      Dr Clemens   4. SYMPTOMS: \"Do you have any symptoms?\" If Yes, ask: \"What symptoms are you having?\"  \"How bad are the symptoms (e.g., mild, moderate, severe)    Protocols used: Medication Question Call-Adult-    "

## 2025-06-05 NOTE — TELEPHONE ENCOUNTER
"Regarding: medication questions  ----- Message from Dona ARMIJO sent at 6/4/2025  8:04 PM EDT -----  \"I was seen by my doctor today as a follow up after being in the hospital and I have a few questions about some of my medications\"    "

## 2025-06-06 ENCOUNTER — TELEPHONE (OUTPATIENT)
Age: 80
End: 2025-06-06

## 2025-06-06 DIAGNOSIS — I26.99 OTHER ACUTE PULMONARY EMBOLISM, UNSPECIFIED WHETHER ACUTE COR PULMONALE PRESENT (HCC): Primary | ICD-10-CM

## 2025-06-06 NOTE — TELEPHONE ENCOUNTER
New pt.    PEP concerned regarding pt. States pt's  just sent to hospital, pt just came home from hospital and in wheelchair, has no one to take care of her.     Call warm transferred from [place]. Pt would be a new pt to [facility]. Pt is SOB, advised she would need to call someone to come help her or I can call 911 or she can call 911, go to hospital. Pt declined, stated she is fine, disconnected call.

## 2025-06-06 NOTE — TELEPHONE ENCOUNTER
Spoke to the patient, she states that she has not been taking the medication and already has dry mouth to begin with. She is asking if she should or should not take it. Please advise.

## 2025-06-06 NOTE — TELEPHONE ENCOUNTER
Ale from [facility] calls requesting the doctor enter home health orders for the patient.  Ale stated she has concerns for the patient and the patient needs help in the home.      Ale can be reached at   ext [phone]

## 2025-06-06 NOTE — TELEPHONE ENCOUNTER
Pcp placed referral for endocrinology. Yajaira is an established patient last seen 5/2/25 with a follow up scheduled 9/11/25.    Note appears that they are concerned about checking blood sugars (cgm?). Is patient ok to wait for 9/11/25? Please follow up with patient. Thank you

## 2025-06-07 ENCOUNTER — NURSE TRIAGE (OUTPATIENT)
Dept: OTHER | Facility: OTHER | Age: 80
End: 2025-06-07

## 2025-06-07 ENCOUNTER — HOSPITAL ENCOUNTER (INPATIENT)
Facility: HOSPITAL | Age: 80
LOS: 3 days | Discharge: HOME HEALTH | End: 2025-06-10
Attending: EMERGENCY MEDICINE | Admitting: INTERNAL MEDICINE
Payer: COMMERCIAL

## 2025-06-07 ENCOUNTER — APPOINTMENT (INPATIENT)
Dept: CT IMAGING | Facility: HOSPITAL | Age: 80
End: 2025-06-07
Payer: COMMERCIAL

## 2025-06-07 ENCOUNTER — APPOINTMENT (EMERGENCY)
Dept: RADIOLOGY | Facility: HOSPITAL | Age: 80
End: 2025-06-07
Payer: COMMERCIAL

## 2025-06-07 DIAGNOSIS — I26.99 PULMONARY EMBOLUS (HCC): ICD-10-CM

## 2025-06-07 DIAGNOSIS — R46.89 COGNITIVE AND BEHAVIORAL CHANGES: ICD-10-CM

## 2025-06-07 DIAGNOSIS — Z65.8 PSYCHOSOCIAL STRESSORS: Primary | ICD-10-CM

## 2025-06-07 DIAGNOSIS — R41.89 COGNITIVE AND BEHAVIORAL CHANGES: ICD-10-CM

## 2025-06-07 DIAGNOSIS — R44.3 HALLUCINATIONS: ICD-10-CM

## 2025-06-07 DIAGNOSIS — R45.1 AGITATION: ICD-10-CM

## 2025-06-07 DIAGNOSIS — F41.0 PANIC DISORDER: ICD-10-CM

## 2025-06-07 DIAGNOSIS — F05 SUNDOWNING: ICD-10-CM

## 2025-06-07 DIAGNOSIS — F22 PARANOIA (HCC): ICD-10-CM

## 2025-06-07 PROBLEM — N18.9 CHRONIC KIDNEY DISEASE (CKD): Status: ACTIVE | Noted: 2023-07-10

## 2025-06-07 PROBLEM — N18.32 STAGE 3B CHRONIC KIDNEY DISEASE (HCC): Status: ACTIVE | Noted: 2023-07-10

## 2025-06-07 LAB
2HR DELTA HS TROPONIN: -1 NG/L
ALBUMIN SERPL BCG-MCNC: 3.8 G/DL (ref 3.5–5)
ALP SERPL-CCNC: 76 U/L (ref 34–104)
ALT SERPL W P-5'-P-CCNC: 11 U/L (ref 7–52)
ANION GAP SERPL CALCULATED.3IONS-SCNC: 11 MMOL/L (ref 4–13)
AST SERPL W P-5'-P-CCNC: 14 U/L (ref 13–39)
ATRIAL RATE: 70 BPM
BACTERIA UR QL AUTO: ABNORMAL /HPF
BASOPHILS # BLD AUTO: 0.03 THOUSANDS/ÂΜL (ref 0–0.1)
BASOPHILS NFR BLD AUTO: 0 % (ref 0–1)
BILIRUB SERPL-MCNC: 0.6 MG/DL (ref 0.2–1)
BILIRUB UR QL STRIP: NEGATIVE
BNP SERPL-MCNC: 100 PG/ML (ref 0–100)
BUN SERPL-MCNC: 19 MG/DL (ref 5–25)
CALCIUM SERPL-MCNC: 9.5 MG/DL (ref 8.4–10.2)
CARDIAC TROPONIN I PNL SERPL HS: 7 NG/L (ref ?–50)
CARDIAC TROPONIN I PNL SERPL HS: 8 NG/L (ref ?–50)
CHLORIDE SERPL-SCNC: 109 MMOL/L (ref 96–108)
CLARITY UR: CLEAR
CO2 SERPL-SCNC: 19 MMOL/L (ref 21–32)
COLOR UR: ABNORMAL
CREAT SERPL-MCNC: 1.32 MG/DL (ref 0.6–1.3)
EOSINOPHIL # BLD AUTO: 0.03 THOUSAND/ÂΜL (ref 0–0.61)
EOSINOPHIL NFR BLD AUTO: 0 % (ref 0–6)
ERYTHROCYTE [DISTWIDTH] IN BLOOD BY AUTOMATED COUNT: 15.3 % (ref 11.6–15.1)
GFR SERPL CREATININE-BSD FRML MDRD: 38 ML/MIN/1.73SQ M
GLUCOSE SERPL-MCNC: 143 MG/DL (ref 65–140)
GLUCOSE SERPL-MCNC: 169 MG/DL (ref 65–140)
GLUCOSE SERPL-MCNC: 169 MG/DL (ref 65–140)
GLUCOSE SERPL-MCNC: 249 MG/DL (ref 65–140)
GLUCOSE UR STRIP-MCNC: NEGATIVE MG/DL
HCT VFR BLD AUTO: 34.3 % (ref 34.8–46.1)
HGB BLD-MCNC: 11.4 G/DL (ref 11.5–15.4)
HGB UR QL STRIP.AUTO: NEGATIVE
IMM GRANULOCYTES # BLD AUTO: 0.06 THOUSAND/UL (ref 0–0.2)
IMM GRANULOCYTES NFR BLD AUTO: 1 % (ref 0–2)
KETONES UR STRIP-MCNC: NEGATIVE MG/DL
LEUKOCYTE ESTERASE UR QL STRIP: ABNORMAL
LIPASE SERPL-CCNC: 22 U/L (ref 11–82)
LYMPHOCYTES # BLD AUTO: 2.44 THOUSANDS/ÂΜL (ref 0.6–4.47)
LYMPHOCYTES NFR BLD AUTO: 26 % (ref 14–44)
MCH RBC QN AUTO: 28.4 PG (ref 26.8–34.3)
MCHC RBC AUTO-ENTMCNC: 33.2 G/DL (ref 31.4–37.4)
MCV RBC AUTO: 85 FL (ref 82–98)
MONOCYTES # BLD AUTO: 0.59 THOUSAND/ÂΜL (ref 0.17–1.22)
MONOCYTES NFR BLD AUTO: 6 % (ref 4–12)
NEUTROPHILS # BLD AUTO: 6.34 THOUSANDS/ÂΜL (ref 1.85–7.62)
NEUTS SEG NFR BLD AUTO: 67 % (ref 43–75)
NITRITE UR QL STRIP: NEGATIVE
NON-SQ EPI CELLS URNS QL MICRO: ABNORMAL /HPF
NRBC BLD AUTO-RTO: 0 /100 WBCS
P AXIS: 53 DEGREES
PH UR STRIP.AUTO: 5.5 [PH]
PLATELET # BLD AUTO: 291 THOUSANDS/UL (ref 149–390)
PMV BLD AUTO: 11.4 FL (ref 8.9–12.7)
POTASSIUM SERPL-SCNC: 4 MMOL/L (ref 3.5–5.3)
PR INTERVAL: 168 MS
PROT SERPL-MCNC: 7.3 G/DL (ref 6.4–8.4)
PROT UR STRIP-MCNC: NEGATIVE MG/DL
QRS AXIS: -27 DEGREES
QRSD INTERVAL: 82 MS
QT INTERVAL: 446 MS
QTC INTERVAL: 481 MS
RBC # BLD AUTO: 4.02 MILLION/UL (ref 3.81–5.12)
RBC #/AREA URNS AUTO: ABNORMAL /HPF
SODIUM SERPL-SCNC: 139 MMOL/L (ref 135–147)
SP GR UR STRIP.AUTO: 1.01 (ref 1–1.03)
T WAVE AXIS: -40 DEGREES
TSH SERPL DL<=0.05 MIU/L-ACNC: 3.79 UIU/ML (ref 0.45–4.5)
UROBILINOGEN UR STRIP-ACNC: <2 MG/DL
VENTRICULAR RATE: 70 BPM
WBC # BLD AUTO: 9.49 THOUSAND/UL (ref 4.31–10.16)
WBC #/AREA URNS AUTO: ABNORMAL /HPF

## 2025-06-07 PROCEDURE — 99285 EMERGENCY DEPT VISIT HI MDM: CPT | Performed by: EMERGENCY MEDICINE

## 2025-06-07 PROCEDURE — 80053 COMPREHEN METABOLIC PANEL: CPT | Performed by: EMERGENCY MEDICINE

## 2025-06-07 PROCEDURE — 93010 ELECTROCARDIOGRAM REPORT: CPT | Performed by: INTERNAL MEDICINE

## 2025-06-07 PROCEDURE — 81001 URINALYSIS AUTO W/SCOPE: CPT | Performed by: INTERNAL MEDICINE

## 2025-06-07 PROCEDURE — 70450 CT HEAD/BRAIN W/O DYE: CPT

## 2025-06-07 PROCEDURE — 96374 THER/PROPH/DIAG INJ IV PUSH: CPT

## 2025-06-07 PROCEDURE — 99285 EMERGENCY DEPT VISIT HI MDM: CPT

## 2025-06-07 PROCEDURE — 71045 X-RAY EXAM CHEST 1 VIEW: CPT

## 2025-06-07 PROCEDURE — 84484 ASSAY OF TROPONIN QUANT: CPT | Performed by: EMERGENCY MEDICINE

## 2025-06-07 PROCEDURE — 85025 COMPLETE CBC W/AUTO DIFF WBC: CPT | Performed by: EMERGENCY MEDICINE

## 2025-06-07 PROCEDURE — 82948 REAGENT STRIP/BLOOD GLUCOSE: CPT

## 2025-06-07 PROCEDURE — 93005 ELECTROCARDIOGRAM TRACING: CPT

## 2025-06-07 PROCEDURE — 99223 1ST HOSP IP/OBS HIGH 75: CPT | Performed by: INTERNAL MEDICINE

## 2025-06-07 PROCEDURE — 83690 ASSAY OF LIPASE: CPT | Performed by: EMERGENCY MEDICINE

## 2025-06-07 PROCEDURE — 83880 ASSAY OF NATRIURETIC PEPTIDE: CPT | Performed by: EMERGENCY MEDICINE

## 2025-06-07 PROCEDURE — 84443 ASSAY THYROID STIM HORMONE: CPT | Performed by: INTERNAL MEDICINE

## 2025-06-07 PROCEDURE — 36415 COLL VENOUS BLD VENIPUNCTURE: CPT | Performed by: EMERGENCY MEDICINE

## 2025-06-07 RX ORDER — MIRTAZAPINE 15 MG/1
15 TABLET, FILM COATED ORAL
Status: DISCONTINUED | OUTPATIENT
Start: 2025-06-07 | End: 2025-06-09

## 2025-06-07 RX ORDER — HYDROXYZINE HYDROCHLORIDE 25 MG/1
25 TABLET, FILM COATED ORAL EVERY 6 HOURS PRN
Status: DISCONTINUED | OUTPATIENT
Start: 2025-06-07 | End: 2025-06-07

## 2025-06-07 RX ORDER — DILTIAZEM HYDROCHLORIDE 120 MG/1
120 CAPSULE, COATED, EXTENDED RELEASE ORAL DAILY
Status: DISCONTINUED | OUTPATIENT
Start: 2025-06-08 | End: 2025-06-10 | Stop reason: HOSPADM

## 2025-06-07 RX ORDER — LORAZEPAM 2 MG/ML
0.5 INJECTION INTRAMUSCULAR ONCE
Status: COMPLETED | OUTPATIENT
Start: 2025-06-07 | End: 2025-06-07

## 2025-06-07 RX ORDER — FLUTICASONE PROPIONATE 50 MCG
1 SPRAY, SUSPENSION (ML) NASAL DAILY
Status: DISCONTINUED | OUTPATIENT
Start: 2025-06-08 | End: 2025-06-10 | Stop reason: HOSPADM

## 2025-06-07 RX ORDER — LORAZEPAM 2 MG/ML
0.5 INJECTION INTRAMUSCULAR ONCE
Status: CANCELLED | OUTPATIENT
Start: 2025-06-07

## 2025-06-07 RX ORDER — HYDROXYZINE HYDROCHLORIDE 25 MG/1
25 TABLET, FILM COATED ORAL EVERY 6 HOURS PRN
Status: DISCONTINUED | OUTPATIENT
Start: 2025-06-07 | End: 2025-06-10 | Stop reason: HOSPADM

## 2025-06-07 RX ORDER — LEVOTHYROXINE SODIUM 75 UG/1
75 TABLET ORAL DAILY
Status: DISCONTINUED | OUTPATIENT
Start: 2025-06-08 | End: 2025-06-10 | Stop reason: HOSPADM

## 2025-06-07 RX ORDER — CARVEDILOL 6.25 MG/1
6.25 TABLET ORAL 2 TIMES DAILY WITH MEALS
Status: DISCONTINUED | OUTPATIENT
Start: 2025-06-07 | End: 2025-06-10 | Stop reason: HOSPADM

## 2025-06-07 RX ORDER — INSULIN LISPRO 100 [IU]/ML
1-5 INJECTION, SOLUTION INTRAVENOUS; SUBCUTANEOUS
Status: DISCONTINUED | OUTPATIENT
Start: 2025-06-07 | End: 2025-06-10 | Stop reason: HOSPADM

## 2025-06-07 RX ORDER — QUETIAPINE FUMARATE 25 MG/1
25 TABLET, FILM COATED ORAL
Status: DISCONTINUED | OUTPATIENT
Start: 2025-06-07 | End: 2025-06-10 | Stop reason: HOSPADM

## 2025-06-07 RX ORDER — INSULIN GLARGINE 100 [IU]/ML
7 INJECTION, SOLUTION SUBCUTANEOUS
Status: DISCONTINUED | OUTPATIENT
Start: 2025-06-07 | End: 2025-06-10 | Stop reason: HOSPADM

## 2025-06-07 RX ORDER — HYDRALAZINE HYDROCHLORIDE 20 MG/ML
5 INJECTION INTRAMUSCULAR; INTRAVENOUS EVERY 6 HOURS PRN
Status: DISCONTINUED | OUTPATIENT
Start: 2025-06-07 | End: 2025-06-10 | Stop reason: HOSPADM

## 2025-06-07 RX ORDER — INSULIN LISPRO 100 [IU]/ML
1-6 INJECTION, SOLUTION INTRAVENOUS; SUBCUTANEOUS
Status: DISCONTINUED | OUTPATIENT
Start: 2025-06-07 | End: 2025-06-10 | Stop reason: HOSPADM

## 2025-06-07 RX ORDER — ASPIRIN 81 MG/1
81 TABLET ORAL DAILY
Status: DISCONTINUED | OUTPATIENT
Start: 2025-06-08 | End: 2025-06-10 | Stop reason: HOSPADM

## 2025-06-07 RX ORDER — ISOSORBIDE MONONITRATE 30 MG/1
30 TABLET, EXTENDED RELEASE ORAL DAILY
Status: DISCONTINUED | OUTPATIENT
Start: 2025-06-08 | End: 2025-06-10 | Stop reason: HOSPADM

## 2025-06-07 RX ADMIN — QUETIAPINE FUMARATE 25 MG: 25 TABLET ORAL at 22:37

## 2025-06-07 RX ADMIN — APIXABAN 10 MG: 5 TABLET, FILM COATED ORAL at 18:17

## 2025-06-07 RX ADMIN — Medication 3 MG: at 22:37

## 2025-06-07 RX ADMIN — INSULIN LISPRO 1 UNITS: 100 INJECTION, SOLUTION INTRAVENOUS; SUBCUTANEOUS at 21:58

## 2025-06-07 RX ADMIN — HYDRALAZINE HYDROCHLORIDE 5 MG: 20 INJECTION, SOLUTION INTRAMUSCULAR; INTRAVENOUS at 18:41

## 2025-06-07 RX ADMIN — LORAZEPAM 0.5 MG: 2 INJECTION INTRAMUSCULAR; INTRAVENOUS at 13:58

## 2025-06-07 RX ADMIN — HYDROXYZINE HYDROCHLORIDE 25 MG: 25 TABLET, FILM COATED ORAL at 22:37

## 2025-06-07 RX ADMIN — INSULIN GLARGINE 7 UNITS: 100 INJECTION, SOLUTION SUBCUTANEOUS at 21:59

## 2025-06-07 NOTE — ASSESSMENT & PLAN NOTE
No new symptoms regarding this.  Saturating well on room air.  Eliquis 10 mg for 1 dose this evening followed by 5 mg twice daily from tomorrow.

## 2025-06-07 NOTE — ASSESSMENT & PLAN NOTE
Lab Results   Component Value Date    HGBA1C 11.1 (H) 03/07/2025       Recent Labs     06/07/25  1512   POCGLU 169*       Blood Sugar Average: Last 72 hrs:  (P) 169    Continue home dose Lantus.  Insulin Sliding scale and Accu-Cheks.  Check HbA1c.  diabetic diet.  hypolycemia protocol.

## 2025-06-07 NOTE — ASSESSMENT & PLAN NOTE
Was recently in [facility] ER this week when she was admitted for PE and DVT and was discharged on p.o. anticoagulation.  During that hospitalization and since discharge, patient has been having intermittent episodes of cognitive decline and behavioral changes.  I spoke with daughter in detail who that patient has been having fluctuating mental status at home.  With inability to sleep at night and she is verbally aggressive and very occasionally physically aggressive towards her .  Also with intermittent confusion and inability to take care of herself after certain time and afternoon most every day.  Patient's  and kids are concerned about patient's safety and ability to take care of her medical conditions by herself as per daughter.  Patient is AO x 3 during my interaction with her in ER.  She was able to tell me her medications as well.  She was seen by neuropsychiatry on recent admission at [place] and cording to neuropsychiatry, patient was deemed to have capacity to make medical decision.  As per daughter, patient failed capacity evaluation at [facility] by neuropsychiatrist there earlier this year.  Patient was also evaluated by geriatrics and is on Remeron and Seroquel but as per daughter, patient does not take her medications regularly at home.    PLAN  Will obtain stat CT head to evaluate for any bleed as patient was started on anticoagulation recently.  Geriatrics consult  Continue home dose Remeron, Seroquel and melatonin.  Delirium precautions.  Check vitamin B12, vitamin D, TSH  When Patient becomes agitated or confused, she cannot leave AMA without discussion with her daughter or .  PT OT evaluation   consult.  Daughter and family is requesting 24-hour care for patient.

## 2025-06-07 NOTE — ASSESSMENT & PLAN NOTE
Lab Results   Component Value Date    EGFR 38 06/07/2025    EGFR 39 06/01/2025    EGFR 43 05/30/2025    CREATININE 1.32 (H) 06/07/2025    CREATININE 1.30 06/01/2025    CREATININE 1.20 05/30/2025       Baseline creatinine seems between 1.1-1.4  Currently at baseline  Trend BMP.

## 2025-06-07 NOTE — ED PROVIDER NOTES
"Time reflects when diagnosis was documented in both MDM as applicable and the Disposition within this note       Time User Action Codes Description Comment    6/7/2025  3:02 PM Marshall Jace Sim [Z65.8] Psychosocial stressors     6/7/2025  3:02 PM Jace Olivares [F41.0] Panic disorder     6/7/2025  3:18 PM Marshall, Jace Add [F22] Paranoia (HCC)     6/7/2025  3:18 PM Jace Olivares Add [R44.3] Hallucinations     6/7/2025  3:18 PM Jace Olivares Add [R45.1] Agitation     6/7/2025  3:41 PM Jace Olivares Add [F05] Sundowning           ED Disposition       ED Disposition   Admit    Condition   Stable    Date/Time   Sat Jun 7, 2025  4:02 PM    Comment   Case was discussed with ANNIE and the patient's admission status was agreed to be Admission Status: inpatient status to the service of Dr. Che .               Assessment & Plan       Medical Decision Making  Patient with history as below presented to triage with CC: \"Patient presents with:  Shortness of Breath: Pt presentse to the ED from home via EMS. Dx PE 2 weeks ago. Pt reports waking up today and reporting SOB. Pt reporting extreme anxiety on scene with ems, took one dose of atarax 25mg en route. Pt reports improvement in symptoms.   \"  Hx obtained from pt and EMS    79-year-old female presenting to the ED via EMS for worsening shortness of breath.  Patient attributes her shortness of breath to anxiety, however was recently diagnosed with PE.  On arrival she has but stable vital signs and is not hypoxic.  Is tachypneic and hyperventilating, repeatedly asking for help.  She did take Atarax prior to arrival which did not help with her symptoms.  She remains increasingly agitated/anxious requesting to leave.  Per family patient has had multiple neuropsychiatry evaluations which have been contradictory.  Most recently she was deemed to have capacity.  However there is increased concern for the family with regards to her mental status and decision-making.  Patient did require " dose of Ativan in the ED to help with agitation with good improvement.  She does appear to be having active paranoia and sundowning along with hallucinations.  After thorough discussion with patient and family decision was made to admit for further management/evaluation for neuropsychological causes.  Medical workup including labs, x-ray and EKG unremarkable.    Plan: CBC, CMP, troponin, EKG, chest x-ray, reassessment, admission    DDx including but not limited to: depression, anxiety, PTSD, bipolar disorder, suicidal ideation, schizophrenia, schizoaffective disorder, personality disorder, substance abuse, metabolic abnormality, thyroid disease. DDX including but not limited to: pneumonia, pleural effusion, CHF, SCAPE, PE, PTX, ACS, MI, asthma exacerbation, COPD exacerbation, COVID 19, EVALI, anemia, metabolic abnormality, renal failure.       I have independently ordered, reviewed and interpreted the following: labs and/or imaging and/or EKG studies listed below  Reviewed external records including notes, and prior labs/imaging results.    Disposition: Patient condition, stable. Discussed need for admission with patient for further management and workup with pt and they are agreeable with plan. Discussed case with [facility] hospitalist , who agreed to admit patient to IP status.     See ED Course for further MDM.      PLEASE NOTE:  This encounter was completed utilizing the [employer] Speech Voice Recognition Software. Grammatical errors, random word insertions, pronoun errors and incomplete sentences are occasional inherent consequences of the system due to software limitations, ambient noise and hardware issues.These may be missed by proof reading prior to affixing electronic signature. Any questions or concerns about the content, text or information contained within the body of this dictation should be directly addressed to the physician for clarification. Please do not hesitate to call me directly if  you have any questions or concerns.      Amount and/or Complexity of Data Reviewed  Independent Historian: caregiver and EMS  External Data Reviewed: labs, radiology, ECG and notes.  Labs: ordered. Decision-making details documented in ED Course.  Radiology: ordered and independent interpretation performed. Decision-making details documented in ED Course.  ECG/medicine tests: ordered and independent interpretation performed. Decision-making details documented in ED Course.    Risk  Prescription drug management.  Decision regarding hospitalization.        ED Course as of 06/07/25 1756   Sat Jun 07, 2025   1138 Reviewed CT PE study from 5/29/2025:  Left lower lobe segmental subsegmental and subsegmental PE. No secondary evidence of right heart strain.   1251 hs TnI 0hr: 8   1400 Comprehensive metabolic panel(!)  Mild hyperchloremia, otherwise no significant electrolyte O'César.  Anion gap normal.  Kidney function at baseline.  No acute LFT/liver function elevation.  Glucose 249   1401 BNP: 100  WNL.   1401 LIPASE: 22  WNL.   1401 Patient increasingly agitated, repeatedly requesting help and stating that she wants to go home.  Discussed with patient's son Andres who states that she does this frequently, and frequently has been calling 911 and not recalling.  He thinks that she may have dementia.  Patient otherwise alert and oriented x 3, repeatedly asking to leave.  Is willing to stay for further medical workup including delta troponin.  ED workup thus far negative.  Patient continues to saturate well on room air.  No significant respiratory distress at this time.  Seems to be worked up from anxiety and agitation.  Will give 1 dose of Ativan.   1506 Delta 2hr hsTnI: -1       Medications   aspirin (ECOTRIN LOW STRENGTH) EC tablet 81 mg (has no administration in time range)   carvedilol (COREG) tablet 6.25 mg (has no administration in time range)   cyanocobalamin (VITAMIN B-12) tablet 1,000 mcg (has no administration in  time range)   diltiazem (CARDIZEM CD) 24 hr capsule 120 mg (has no administration in time range)   fluticasone (FLONASE) 50 mcg/act nasal spray 1 spray (has no administration in time range)   insulin glargine (LANTUS) subcutaneous injection 7 Units 0.07 mL (has no administration in time range)   isosorbide mononitrate (IMDUR) 24 hr tablet 30 mg (has no administration in time range)   levothyroxine tablet 75 mcg (has no administration in time range)   melatonin tablet 3 mg (has no administration in time range)   mirtazapine (REMERON) tablet 15 mg (has no administration in time range)   QUEtiapine (SEROquel) tablet 25 mg (has no administration in time range)   hydrOXYzine HCL (ATARAX) tablet 25 mg (has no administration in time range)   insulin lispro (HumALOG/ADMELOG) 100 units/mL subcutaneous injection 1-6 Units (has no administration in time range)   insulin lispro (HumALOG/ADMELOG) 100 units/mL subcutaneous injection 1-5 Units (has no administration in time range)   apixaban (ELIQUIS) tablet 5 mg (has no administration in time range)   apixaban (ELIQUIS) tablet 10 mg (has no administration in time range)   hydrALAZINE (APRESOLINE) injection 5 mg (has no administration in time range)   LORazepam (ATIVAN) injection 0.5 mg (0.5 mg Intravenous Given 6/7/25 1358)       ED Risk Strat Scores                    No data recorded                            History of Present Illness       Chief Complaint   Patient presents with    Shortness of Breath     Pt presentse to the ED from home via EMS. Dx PE 2 weeks ago. Pt reports waking up today and reporting SOB. Pt reporting extreme anxiety on scene with ems, took one dose of atarax 25mg en route. Pt reports improvement in symptoms.        Past Medical History[1]   Past Surgical History[2]   Family History[3]   Social History[4]   E-Cigarette/Vaping    E-Cigarette Use Never User       E-Cigarette/Vaping Substances    Nicotine No     THC No     CBD No     Flavoring No     Other  No     Unknown No       I have reviewed and agree with the history as documented.     79-year-old female with history of recently diagnosed PE, CAD, anxiety presenting to the ED with complaints of progressively worsening shortness of breath ongoing for the past few days, really worsened this morning.  Patient reports she feels like its her anxiety, and states that she was told not to take her Atarax after recently being diagnosed with PE.  She reports she feels anxious causing her to feel short of breath, and reports dry nonproductive cough as well.  She denies associated chest pain, however reports some discomfort in her diaphragm from breathing.  She also denies fever, chills, congestion, sore throat, headache, neck pain/stiffness.  She reports increased leg swelling without increased leg pain,, however does report that she had a recent fall a few weeks ago injuring her right knee which she still has pain from.  Patient took an Atarax en route with EMS without relief of her symptoms.  She denies hemoptysis, rash, nausea, vomiting, abdominal pain, bloody/tarry stool.        Review of Systems   Constitutional:  Negative for chills and fever.   HENT:  Negative for congestion and sore throat.    Eyes:  Negative for photophobia, pain, redness and visual disturbance.   Respiratory:  Positive for cough and shortness of breath. Negative for chest tightness.    Cardiovascular:  Negative for chest pain and palpitations.   Gastrointestinal:  Negative for abdominal pain, constipation, diarrhea, nausea and vomiting.   Genitourinary:  Negative for difficulty urinating, dysuria, flank pain, frequency, hematuria, pelvic pain, urgency, vaginal bleeding, vaginal discharge and vaginal pain.   Musculoskeletal:  Negative for arthralgias, back pain, neck pain and neck stiffness.   Skin:  Negative for color change and rash.   Neurological:  Negative for dizziness, seizures, syncope, light-headedness, numbness and headaches.    Psychiatric/Behavioral:  The patient is nervous/anxious.    All other systems reviewed and are negative.          Objective       ED Triage Vitals   Temperature Pulse Blood Pressure Respirations SpO2 Patient Position - Orthostatic VS   06/07/25 1142 06/07/25 1138 06/07/25 1138 06/07/25 1138 06/07/25 1138 06/07/25 1138   98.1 °F (36.7 °C) 65 167/70 20 100 % Sitting      Temp Source Heart Rate Source BP Location FiO2 (%) Pain Score    06/07/25 1142 06/07/25 1138 06/07/25 1138 -- 06/07/25 1138    Oral Monitor Right arm  3      Vitals      Date and Time Temp Pulse SpO2 Resp BP Pain Score FACES Pain Rating User   06/07/25 1400 -- 76 100 % 18 180/80 -- -- DO   06/07/25 1142 98.1 °F (36.7 °C) -- -- -- -- -- -- HVL   06/07/25 1138 -- 65 100 % 20 167/70 3 -- HVL            Physical Exam  Vitals and nursing note reviewed.   Constitutional:       General: She is in acute distress.      Appearance: She is well-developed. She is not toxic-appearing.   HENT:      Head: Normocephalic and atraumatic.      Right Ear: External ear normal.      Left Ear: External ear normal.      Nose: Nose normal. No congestion.      Mouth/Throat:      Mouth: Mucous membranes are moist.      Pharynx: Oropharynx is clear. No oropharyngeal exudate or posterior oropharyngeal erythema.     Eyes:      Extraocular Movements: Extraocular movements intact.      Conjunctiva/sclera: Conjunctivae normal.      Pupils: Pupils are equal, round, and reactive to light.       Cardiovascular:      Rate and Rhythm: Normal rate and regular rhythm.      Pulses: Normal pulses.      Heart sounds: Normal heart sounds. No murmur heard.  Pulmonary:      Effort: Tachypnea present. No respiratory distress.      Breath sounds: Normal breath sounds. No stridor. No wheezing, rhonchi or rales.   Chest:      Chest wall: No tenderness.   Abdominal:      General: Bowel sounds are normal.      Palpations: Abdomen is soft.      Tenderness: There is no abdominal tenderness. There is no  right CVA tenderness, left CVA tenderness, guarding or rebound.     Musculoskeletal:         General: No swelling, tenderness or deformity.      Cervical back: Normal range of motion and neck supple. No rigidity or tenderness.      Right lower leg: No tenderness. No edema.      Left lower leg: No tenderness. No edema.      Comments: Mild tenderness to palpation of her right knee.  Otherwise tolerates active and passive range of motion of the lower extremities.  No significant pitting edema.  Distally neurovascular intact to bilateral lower extremities.   Lymphadenopathy:      Cervical: No cervical adenopathy.     Skin:     General: Skin is warm and dry.      Capillary Refill: Capillary refill takes less than 2 seconds.      Coloration: Skin is not jaundiced or pale.      Findings: No bruising, erythema, lesion or rash.     Neurological:      General: No focal deficit present.      Mental Status: She is alert and oriented to person, place, and time. Mental status is at baseline.      GCS: GCS eye subscore is 4. GCS verbal subscore is 5. GCS motor subscore is 6.      Cranial Nerves: Cranial nerves 2-12 are intact. No cranial nerve deficit, dysarthria or facial asymmetry.      Sensory: Sensation is intact. No sensory deficit.      Motor: Motor function is intact. No weakness.      Coordination: Coordination is intact. Coordination normal.      Gait: Gait is intact.     Psychiatric:         Mood and Affect: Mood normal.         Behavior: Behavior normal.         Thought Content: Thought content normal.         Results Reviewed       Procedure Component Value Units Date/Time    TSH, 3rd generation with Free T4 reflex [[id number]]  (Normal) Collected: 06/07/25 1415    Lab Status: Final result Specimen: Blood from Arm, Right Updated: 06/07/25 1719     TSH 3RD GENERATON 3.795 uIU/mL     Urinalysis with microscopic [[id number]]     Lab Status: No result Specimen: Urine     Fingerstick Glucose (POCT) [[id number]]  (Abnormal)  Collected: 06/07/25 1512    Lab Status: Final result Specimen: Blood Updated: 06/07/25 1513     POC Glucose 169 mg/dl     HS Troponin I 2hr [[id number]]  (Normal) Collected: 06/07/25 1415    Lab Status: Final result Specimen: Blood from Arm, Right Updated: 06/07/25 1449     hs TnI 2hr 7 ng/L      Delta 2hr hsTnI -1 ng/L     Comprehensive metabolic panel [[id number]]  (Abnormal) Collected: 06/07/25 1214    Lab Status: Final result Specimen: Blood from Arm, Right Updated: 06/07/25 1343     Sodium 139 mmol/L      Potassium 4.0 mmol/L      Chloride 109 mmol/L      CO2 19 mmol/L      ANION GAP 11 mmol/L      BUN 19 mg/dL      Creatinine 1.32 mg/dL      Glucose 249 mg/dL      Calcium 9.5 mg/dL      AST 14 U/L      ALT 11 U/L      Alkaline Phosphatase 76 U/L      Total Protein 7.3 g/dL      Albumin 3.8 g/dL      Total Bilirubin 0.60 mg/dL      eGFR 38 ml/min/1.73sq m     Narrative:      National Kid[employer] guidelines for Chronic Kidney Disease (CKD):     Stage 1 with normal or high GFR (GFR > 90 mL/min/1.73 square meters)    Stage 2 Mild CKD (GFR = 60-89 mL/min/1.73 square meters)    Stage 3A Moderate CKD (GFR = 45-59 mL/min/1.73 square meters)    Stage 3B Moderate CKD (GFR = 30-44 mL/min/1.73 square meters)    Stage 4 Severe CKD (GFR = 15-29 mL/min/1.73 square meters)    Stage 5 End Stage CKD (GFR <15 mL/min/1.73 square meters)  Note: GFR calculation is accurate only with a steady state creatinine    Lipase [[id number]]  (Normal) Collected: 06/07/25 1214    Lab Status: Final result Specimen: Blood from Arm, Right Updated: 06/07/25 1343     Lipase 22 u/L     B-Type Natriuretic Peptide(BNP) [[id number]]  (Normal) Collected: 06/07/25 1214    Lab Status: Final result Specimen: Blood from Arm, Right Updated: 06/07/25 1312      pg/mL     HS Troponin 0hr (reflex protocol) [[id number]]  (Normal) Collected: 06/07/25 1214    Lab Status: Final result Specimen: Blood from Arm, Right Updated: 06/07/25 1250     hs TnI  0hr 8 ng/L     CBC and differential [[id number]]  (Abnormal) Collected: 06/07/25 1214    Lab Status: Final result Specimen: Blood from Arm, Right Updated: 06/07/25 1221     WBC 9.49 Thousand/uL      RBC 4.02 Million/uL      Hemoglobin 11.4 g/dL      Hematocrit 34.3 %      MCV 85 fL      MCH 28.4 pg      MCHC 33.2 g/dL      RDW 15.3 %      MPV 11.4 fL      Platelets 291 Thousands/uL      nRBC 0 /100 WBCs      Segmented % 67 %      Immature Grans % 1 %      Lymphocytes % 26 %      Monocytes % 6 %      Eosinophils Relative 0 %      Basophils Relative 0 %      Absolute Neutrophils 6.34 Thousands/µL      Absolute Immature Grans 0.06 Thousand/uL      Absolute Lymphocytes 2.44 Thousands/µL      Absolute Monocytes 0.59 Thousand/µL      Eosinophils Absolute 0.03 Thousand/µL      Basophils Absolute 0.03 Thousands/µL             XR chest 1 view portable   ED Interpretation by Konrad Dooley DO (06/07 4603)   Lungs are clear without focal consolidation or pneumothorax.  No pleural effusion.  Cardio mediastinal silhouette appears similar to prior chest x-ray done on 5/29/2025.  No significant change when compared to prior chest x-ray done on 5/29/2025.      CT head wo contrast    (Results Pending)       ECG 12 Lead Documentation Only    Date/Time: 6/7/2025 12:12 PM    Performed by: Konrad Dooley DO  Authorized by: Konrad Dooley DO    Indications / Diagnosis:  Shortness of breath.  ECG reviewed by me, the ED Provider: yes    Patient location:  ED  Previous ECG:     Previous ECG:  Compared to current    Comparison ECG info:  May 29, 2025.    Similarity:  Changes noted  Interpretation:     Interpretation: abnormal    Rate:     ECG rate:  70.    ECG rate assessment: normal    Rhythm:     Rhythm: sinus rhythm    Ectopy:     Ectopy: none    QRS:     QRS axis:  Left    QRS intervals:  Normal  Conduction:     Conduction: normal    ST segments:     ST segments:  Non-specific  T waves:     T waves: inverted      Inverted:  III      ED  Medication and Procedure Management   Prior to Admission Medications   Prescriptions Last Dose Informant Patient Reported? Taking?   Continuous Glucose Sensor (Dexcom G7 Sensor)   No No   Sig: Use 1 Device every 10 days   Patient not taking: Reported on 2025   Insulin Glargine Solostar (Lantus SoloStar) 100 UNIT/ML SOPN   No No   Sig: Use 7u subcutaneous daily bedtime   QUEtiapine (SEROquel) 25 mg tablet   No No   Sig: Take 1 tablet (25 mg total) by mouth daily at bedtime   apixaban (Eliquis) 5 mg   No No   Sig: Take 2 tablets (10 mg total) by mouth 2 (two) times a day for 7 days, THEN 1 tablet (5 mg total) 2 (two) times a day for 23 days.   aspirin (ECOTRIN LOW STRENGTH) 81 mg EC tablet   No No   Sig: Take 1 tablet (81 mg total) by mouth daily   carvedilol (COREG) 6.25 mg tablet   No No   Sig: Take 1 tablet (6.25 mg total) by mouth 2 (two) times a day with meals   cyanocobalamin (VITAMIN B-12) 1000 MCG tablet   No No   Sig: Take 1 tablet (1,000 mcg total) by mouth daily   desloratadine (CLARINEX) 5 MG tablet   No No   Sig: Take 1 tablet (5 mg total) by mouth daily for 14 days   diltiazem (CARDIZEM CD) 120 mg 24 hr capsule   No No   Sig: Take 1 capsule (120 mg total) by mouth daily   fluticasone (FLONASE) 50 mcg/act nasal spray   No No   Si spray into each nostril daily   hydrOXYzine HCL (ATARAX) 25 mg tablet   No No   Sig: Take 1 tablet (25 mg total) by mouth every 6 (six) hours as needed for anxiety   isosorbide mononitrate (IMDUR) 30 mg 24 hr tablet   No No   Sig: Take 1 tablet (30 mg total) by mouth daily   levothyroxine 75 mcg tablet   No No   Sig: Take 1 tablet (75 mcg total) by mouth daily   melatonin 3 mg   No No   Sig: Take 1 tablet (3 mg total) by mouth daily at bedtime   mirtazapine (REMERON) 15 mg tablet   No No   Sig: TAKE 1 TABLET (15 MG TOTAL) BY MOUTH DAILY AT BEDTIME DO NOT START BEFORE MAY 14, 2025.      Facility-Administered Medications: None     Patient's Medications   Discharge  Prescriptions    No medications on file     No discharge procedures on file.  ED SEPSIS DOCUMENTATION   Time reflects when diagnosis was documented in both MDM as applicable and the Disposition within this note       Time User Action Codes Description Comment    6/7/2025  3:02 PM Jace Olivares [Z65.8] Psychosocial stressors     6/7/2025  3:02 PM Jace Olivares [F41.0] Panic disorder     6/7/2025  3:18 PM Jace Olivares [F22] Paranoia (HCC)     6/7/2025  3:18 PM Jace Olivares [R44.3] Hallucinations     6/7/2025  3:18 PM Jace Olivares [R45.1] Agitation     6/7/2025  3:41 PM Jace Olivares [F05] Sundowning                      [1]   Past Medical History:  Diagnosis Date    Acute embolism and thrombosis of unspecified deep veins of unspecified lower extremity (HCC)     Last Assessed:  5/18/17    Anemia     Anosmia     Anxiety     Arthritis     Asthma     Back pain     Bilateral macular retinal edema     CAD (coronary artery disease)     Cataract     Cervical disc herniation     Cervical radiculopathy     Cervical spinal stenosis     Cervical spondylolysis     Chronic kidney disease     Chronic mastoiditis     Colon polyp     Complex endometrial hyperplasia     Depression     Diabetes mellitus (HCC)     Disease of thyroid gland     DVT (deep venous thrombosis) (HCC)     DVT (deep venous thrombosis) (HCC) 06/16/2017    Fibromyalgia     Fibromyalgia, primary     Hyperlipidemia     Hypertension     Hypothyroid     Kidney stone     Lumbar radiculopathy     Neck pain     Obese     PONV (postoperative nausea and vomiting)     Shingles 07/01/2021    Spinal stenosis     Stomach ulcer     Thyroid disease     Toxic metabolic encephalopathy 02/11/2025    Vascular claudication (HCC) 12/11/2017   [2]   Past Surgical History:  Procedure Laterality Date    BACK SURGERY      CARPAL TUNNEL RELEASE      CATARACT EXTRACTION      CHOLECYSTECTOMY      COLONOSCOPY      CORONARY ANGIOPLASTY      CORONARY ARTERY BYPASS GRAFT       CYSTOSCOPY N/A 2017    Procedure: CYSTOSCOPY;  Surgeon: Tacho Arnold MD;  Location: [facility];  Service: Gynecology Oncology    CYSTOSCOPY      MD LAPS TOTAL HYSTERECT 250 GM/< W/RMVL TUBE/OVARY N/A 2017    Procedure: ROBOTIC HYSTERECTOMY; BILATERAL SALPINO-OOPHERECTOMY; umbilical hernia repair.;  Surgeon: Tacho Arnold MD;  Location: [facility];  Service: Gynecology Oncology    MD REPAIR FIRST ABDOMINAL WALL HERNIA N/A 2022    Procedure: REPAIR HERNIA INCISIONAL;  Surgeon: Horacio Scherer DO;  Location: [facility];  Service: General    TONSILECTOMY AND ADNOIDECTOMY      TONSILLECTOMY      UMBILICAL HERNIA REPAIR     [3]   Family History  Problem Relation Name Age of Onset    Arthritis Mother      Leukemia Mother      Other Mother          Anxiety, major depressive disorder, recurrent episode with atypical features    Coronary artery disease Father          Heart problem    Diabetes Father      Other Father          Infectious disease    Alzheimer's disease Maternal Grandmother      Other Maternal Grandfather          Heart problem    Other Daughter          Anxiety, major depressive disorder, recurrent episode with atypical features    Alcohol abuse Other          Grandparent    Cancer Family      Diabetes Family      Hypertension Family      Other Family          Reported prior back trouble, thyroid disorder   [4]   Social History  Tobacco Use    Smoking status: Former     Current packs/day: 0.00     Average packs/day: 1 pack/day for 6.0 years (6.0 ttl pk-yrs)     Types: Cigarettes     Start date:      Quit date:      Years since quittin.4     Passive exposure: Never    Smokeless tobacco: Never    Tobacco comments:     Smoked about a half a pack for about 4 yrs.  Quite about 50 yrs ago.   Vaping Use    Vaping status: Never Used   Substance Use Topics    Alcohol use: Never    Drug use: No        Konrad Dooley DO  [phone]

## 2025-06-07 NOTE — TELEPHONE ENCOUNTER
"REASON FOR CONVERSATION: Breathing Difficulty    SYMPTOMS: Pt calling in with severe difficulty breathing. States she cannot breathe and is having a hard time speaking.    OTHER HEALTH INFORMATION: Cardiac hx & hx of lung nodule. Pt lives with her .    PROTOCOL DISPOSITION: Call  Now    CARE ADVICE PROVIDED: Advised for pt to call 911. Pt said she would if she felt it was necessary then disconnected call. CTS RN called 911 dispatch for pt.  verified that EMS would be heading to pts residence.    PRACTICE FOLLOW-UP: None at this time.       Reason for Disposition   SEVERE difficulty breathing (e.g., struggling for each breath, speaks in single words)    Answer Assessment - Initial Assessment Questions  1. RESPIRATORY STATUS: \"Describe your breathing?\" (e.g., wheezing, shortness of breath, unable to speak, severe coughing)       States she cannot breath.     2. ONSET: \"When did this breathing problem begin?\"       Started days ago but worse today.     3. PATTERN \"Does the difficult breathing come and go, or has it been constant since it started?\"       Constant    4. SEVERITY: \"How bad is your breathing?\" (e.g., mild, moderate, severe)       Severe- states she cannot speak and is speaking in short phrases.    5. RECURRENT SYMPTOM: \"Have you had difficulty breathing before?\" If Yes, ask: \"When was the last time?\" and \"What happened that time?\"       Unknown    6. CARDIAC HISTORY: \"Do you have any history of heart disease?\" (e.g., heart attack, angina, bypass surgery, angioplasty)       Yes- see hx    7. LUNG HISTORY: \"Do you have any history of lung disease?\"  (e.g., pulmonary embolus, asthma, emphysema)      See hx    8. CAUSE: \"What do you think is causing the breathing problem?\"       Unknown    9. OTHER SYMPTOMS: \"Do you have any other symptoms?\" (e.g., chest pain, cough, dizziness, fever, runny nose)    Protocols used: Breathing Difficulty-Adult-AH    "

## 2025-06-07 NOTE — TELEPHONE ENCOUNTER
"Regarding: Shortness of breath/medication question  ----- Message from Glenda HARGROVE sent at 6/7/2025 10:20 AM EDT -----  \"I am calling because I am very short of breath. I also have a questions about my medication. I don't know if I am supposed to take the hydroxyzine because I also take a sleeping pill. I am not sure what to do.\"    "

## 2025-06-07 NOTE — H&P
H&P - Hospitalist   Name: Yajaira Marsh 79 y.o. female I MRN: [id number]  Unit/Bed#: ED-20 I Date of Admission: 6/7/2025   Date of Service: 6/7/2025 I Hospital Day: 0     Assessment & Plan  Cognitive and behavioral changes  Was recently in [facility] ER this week when she was admitted for PE and DVT and was discharged on p.o. anticoagulation.  During that hospitalization and since discharge, patient has been having intermittent episodes of cognitive decline and behavioral changes.  I spoke with daughter in detail who that patient has been having fluctuating mental status at home.  With inability to sleep at night and she is verbally aggressive and very occasionally physically aggressive towards her .  Also with intermittent confusion and inability to take care of herself after certain time and afternoon most every day.  Patient's  and kids are concerned about patient's safety and ability to take care of her medical conditions by herself as per daughter.  Patient is AO x 3 during my interaction with her in ER.  She was able to tell me her medications as well.  She was seen by neuropsychiatry on recent admission at [place] and cording to neuropsychiatry, patient was deemed to have capacity to make medical decision.  As per daughter, patient failed capacity evaluation at [facility] by neuropsychiatrist there earlier this year.  Patient was also evaluated by geriatrics and is on Remeron and Seroquel but as per daughter, patient does not take her medications regularly at home.    PLAN  Will obtain stat CT head to evaluate for any bleed as patient was started on anticoagulation recently.  Geriatrics consult  Continue home dose Remeron, Seroquel and melatonin.  Delirium precautions.  Check vitamin B12, vitamin D, TSH  When Patient becomes agitated or confused, she cannot leave AMA without discussion with her daughter or .  PT OT evaluation   consult.  Daughter and family is  requesting 24-hour care for patient.  CAD s/p CABG  Denies any chest pain.  New home dose aspirin, statin Coreg.  Hypothyroidism  Continue home dose levothyroxine  Check TSH.  Type 2 diabetes mellitus with hyperglycemia, with long-term current use of insulin ([facility])  Lab Results   Component Value Date    HGBA1C 11.1 (H) 03/07/2025       Recent Labs     06/07/25  1512   POCGLU 169*       Blood Sugar Average: Last 72 hrs:  (P) 169    Continue home dose Lantus.  Insulin Sliding scale and Accu-Cheks.  Check HbA1c.  diabetic diet.  hypolycemia protocol.    Essential hypertension  Blood pressure on the higher side in ER  Continue home dose Cardizem and Coreg.  Stage 3b chronic kidney disease ([facility])  Lab Results   Component Value Date    EGFR 38 06/07/2025    EGFR 39 06/01/2025    EGFR 43 05/30/2025    CREATININE 1.32 (H) 06/07/2025    CREATININE 1.30 06/01/2025    CREATININE 1.20 05/30/2025       Baseline creatinine seems between 1.1-1.4  Currently at baseline  Trend BMP.  Recent diagnosis pulmonary embolus and DVT ([facility])  No new symptoms regarding this.  Saturating well on room air.  Eliquis 10 mg for 1 dose this evening followed by 5 mg twice daily from tomorrow.        VTE Pharmacologic Prophylaxis:   Moderate Risk (Score 3-4) - Pharmacological DVT Prophylaxis Ordered: apixaban (Eliquis).  Code Status: Level 1 - Full Code   Discussion with family: Updated  (daughter) via phone.    Anticipated Length of Stay: Patient will be admitted on an inpatient basis with an anticipated length of stay of greater than 2 midnights secondary to PT OT evaluation, safe discharge planning.    History of Present Illness   Chief Complaint: Cognitive and behavioral changes at home    Yajaira Marsh is a 79 y.o. female with a PMH of recent diagnosis of pulmonary embolus and DVT on Eliquis, cognitive impairment, CAD status post CABG who presents with cognitive and behavioral changes at home.  Patient was recently at [facility] for pulmonary embolus and DVT and was discharged on June 2, 2025 on oral anticoagulation.  PT OT recommended rehab at that point but patient opted to go home.  Patient had significant delirium finding restraints, medication and geriatric evaluation during that hospitalization as well.  She was evaluated by neuropsychiatry and deemed to have capacity to make medical decisions.  Since discharge, per patient's daughter, patient has been intermittently confused with inability to sleep at night.  She has been intermittently verbally aggressive towards her  and occasionally physically aggressive.  She sometimes confuses her medications and misses medication doses per daughter.  Daughter and son have been visiting her as much as they can and providing support although they are not able to provide 24/7 care for her and they are worried about her and her 's safety.  Patient denies any acute discomfort.  She reports she had intermittent lightheadedness earlier but nothing the last couple of days.  She denies any worsening shortness of breath.  No chest pain.  When I asked her why she is in the hospital, she said she was brought into hospital because of her recent blood clots.      Review of Systems   Constitutional:  Positive for activity change. Negative for chills and fever.   HENT: Negative.     Respiratory: Negative.     Cardiovascular: Negative.    Gastrointestinal: Negative.    Genitourinary: Negative.    Neurological: Negative.        Historical Information   Past Medical History[1]  Past Surgical History[2]  Social History[3]  E-Cigarette/Vaping    E-Cigarette Use Never User      E-Cigarette/Vaping Substances    Nicotine No     THC No     CBD No     Flavoring No     Other No     Unknown No        Social History:  Marital Status: /Civil Union     Meds/Allergies     Prior to Admission medications    Medication Sig Start Date End Date Taking? Authorizing Provider   apixaban (Eliquis) 5 mg Take  2 tablets (10 mg total) by mouth 2 (two) times a day for 7 days, THEN 1 tablet (5 mg total) 2 (two) times a day for 23 days. 5/30/25 6/29/25  Jolanta Constantino PA-C   aspirin (ECOTRIN LOW STRENGTH) 81 mg EC tablet Take 1 tablet (81 mg total) by mouth daily 6/3/25   Salvatore Mack, DO   carvedilol (COREG) 6.25 mg tablet Take 1 tablet (6.25 mg total) by mouth 2 (two) times a day with meals 6/2/25   Salvatore Mack DO   Continuous Glucose Sensor (Dexcom G7 Sensor) Use 1 Device every 10 days  Patient not taking: Reported on 6/4/2025 3/31/25   Sukumar Clemens DO   cyanocobalamin (VITAMIN B-12) 1000 MCG tablet Take 1 tablet (1,000 mcg total) by mouth daily 3/28/25 5/27/25  ALAN Rodriguez   desloratadine (CLARINEX) 5 MG tablet Take 1 tablet (5 mg total) by mouth daily for 14 days 5/1/25 5/15/25  Essie Woo MD   diltiazem (CARDIZEM CD) 120 mg 24 hr capsule Take 1 capsule (120 mg total) by mouth daily 3/28/25 5/27/25  ALAN Rodriguez   fluticasone (FLONASE) 50 mcg/act nasal spray 1 spray into each nostril daily 6/3/25   Salvatore Mack DO   hydrOXYzine HCL (ATARAX) 25 mg tablet Take 1 tablet (25 mg total) by mouth every 6 (six) hours as needed for anxiety 6/2/25   Salvatore Mack DO   Insulin Glargine Solostar (Lantus SoloStar) 100 UNIT/ML SOPN Use 7u subcutaneous daily bedtime 4/15/25   Ranjith Pope MD   isosorbide mononitrate (IMDUR) 30 mg 24 hr tablet Take 1 tablet (30 mg total) by mouth daily 3/28/25 5/27/25  ALAN Rodriguez   levothyroxine 75 mcg tablet Take 1 tablet (75 mcg total) by mouth daily 3/27/25 5/26/25  ALAN Rodriguez   melatonin 3 mg Take 1 tablet (3 mg total) by mouth daily at bedtime 6/2/25   Salvatore Mack DO   mirtazapine (REMERON) 15 mg tablet TAKE 1 TABLET (15 MG TOTAL) BY MOUTH DAILY AT BEDTIME DO NOT START BEFORE MAY 14, 2025. 5/22/25   Selvin Salas MD   QUEtiapine (SEROquel) 25 mg tablet Take 1 tablet (25 mg total) by mouth daily at bedtime 6/2/25   Salvatore Mack,  DO     Allergies   Allergen Reactions    Bee Pollen Other (See Comments)    Cat Dander Other (See Comments)    Dog Epithelium (Canis Lupus Familiaris) Other (See Comments)    Molds & Smuts Allergic Rhinitis    Other Allergic Rhinitis     RAGWEED, CAT DANDER, DOG DANDER     Short Ragweed Pollen Ext Other (See Comments)    Pollen Extract Other (See Comments)     Cold symptoms         Objective :  Temp:  [98.1 °F (36.7 °C)] 98.1 °F (36.7 °C)  HR:  [65-76] 76  BP: (167-180)/(70-80) 180/80  Resp:  [18-20] 18  SpO2:  [100 %] 100 %  O2 Device: None (Room air)    Physical Exam  Constitutional:       General: She is not in acute distress.    Cardiovascular:      Rate and Rhythm: Normal rate and regular rhythm.      Heart sounds: Normal heart sounds. No murmur heard.  Pulmonary:      Breath sounds: Normal breath sounds. No wheezing or rales.   Abdominal:      General: There is no distension.      Palpations: Abdomen is soft.      Tenderness: There is no abdominal tenderness.     Skin:     General: Skin is warm.     Neurological:      Mental Status: She is alert.      Comments: Awake alert and communicative  Moves all extremities.  ableTo raise bilateral upper extremities off the bed without drift.  Plantar and dorsiflexes feet bilaterally.        Lines/Drains:            Lab Results: I have reviewed the following results:  Results from last 7 days   Lab Units 06/07/25  1214   WBC Thousand/uL 9.49   HEMOGLOBIN g/dL 11.4*   HEMATOCRIT % 34.3*   PLATELETS Thousands/uL 291   SEGS PCT % 67   LYMPHO PCT % 26   MONO PCT % 6   EOS PCT % 0     Results from last 7 days   Lab Units 06/07/25  1214   SODIUM mmol/L 139   POTASSIUM mmol/L 4.0   CHLORIDE mmol/L 109*   CO2 mmol/L 19*   BUN mg/dL 19   CREATININE mg/dL 1.32*   ANION GAP mmol/L 11   CALCIUM mg/dL 9.5   ALBUMIN g/dL 3.8   TOTAL BILIRUBIN mg/dL 0.60   ALK PHOS U/L 76   ALT U/L 11   AST U/L 14   GLUCOSE RANDOM mg/dL 249*         Results from last 7 days   Lab Units 06/07/25  1512  06/02/25  1657 06/02/25  1116 06/02/25  0646 06/02/25  0629 06/01/25  2131 06/01/25  1640 06/01/25  1136 06/01/25  0750 05/31/25  2100   POC GLUCOSE mg/dl 169* 145* 138 86 83 145* 176* 144* 140 264*     Lab Results   Component Value Date    HGBA1C 11.1 (H) 03/07/2025    HGBA1C 12.4 (A) 02/10/2025    HGBA1C 10.8 (H) 08/21/2024             Administrative Statements       ** Please Note: This note has been constructed using a voice recognition system. **         [1]   Past Medical History:  Diagnosis Date    Acute embolism and thrombosis of unspecified deep veins of unspecified lower extremity (HCC)     Last Assessed:  5/18/17    Anemia     Anosmia     Anxiety     Arthritis     Asthma     Back pain     Bilateral macular retinal edema     CAD (coronary artery disease)     Cataract     Cervical disc herniation     Cervical radiculopathy     Cervical spinal stenosis     Cervical spondylolysis     Chronic kidney disease     Chronic mastoiditis     Colon polyp     Complex endometrial hyperplasia     Depression     Diabetes mellitus (HCC)     Disease of thyroid gland     DVT (deep venous thrombosis) (HCC)     DVT (deep venous thrombosis) (HCC) 06/16/2017    Fibromyalgia     Fibromyalgia, primary     Hyperlipidemia     Hypertension     Hypothyroid     Kidney stone     Lumbar radiculopathy     Neck pain     Obese     PONV (postoperative nausea and vomiting)     Shingles 07/01/2021    Spinal stenosis     Stomach ulcer     Thyroid disease     Toxic metabolic encephalopathy 02/11/2025    Vascular claudication (HCC) 12/11/2017   [2]   Past Surgical History:  Procedure Laterality Date    BACK SURGERY      CARPAL TUNNEL RELEASE      CATARACT EXTRACTION      CHOLECYSTECTOMY      COLONOSCOPY      CORONARY ANGIOPLASTY      CORONARY ARTERY BYPASS GRAFT      CYSTOSCOPY N/A 6/20/2017    Procedure: CYSTOSCOPY;  Surgeon: Tacho Arnold MD;  Location: [facility];  Service: Gynecology Oncology    CYSTOSCOPY      DC LAPS TOTAL HYSTERECT  250 GM/< W/RMVL TUBE/OVARY N/A 2017    Procedure: ROBOTIC HYSTERECTOMY; BILATERAL SALPINO-OOPHERECTOMY; umbilical hernia repair.;  Surgeon: Tacho Arnold MD;  Location: [facility];  Service: Gynecology Oncology    CT REPAIR FIRST ABDOMINAL WALL HERNIA N/A 2022    Procedure: REPAIR HERNIA INCISIONAL;  Surgeon: Horacio Scherer DO;  Location: [facility];  Service: General    TONSILECTOMY AND ADNOIDECTOMY      TONSILLECTOMY      UMBILICAL HERNIA REPAIR     [3]   Social History  Tobacco Use    Smoking status: Former     Current packs/day: 0.00     Average packs/day: 1 pack/day for 6.0 years (6.0 ttl pk-yrs)     Types: Cigarettes     Start date:      Quit date:      Years since quittin.4     Passive exposure: Never    Smokeless tobacco: Never    Tobacco comments:     Smoked about a half a pack for about 4 yrs.  Quite about 50 yrs ago.   Vaping Use    Vaping status: Never Used   Substance and Sexual Activity    Alcohol use: Never    Drug use: No    Sexual activity: Not Currently     Comment: No known STD risk factors

## 2025-06-08 LAB
25(OH)D3 SERPL-MCNC: 27.2 NG/ML (ref 30–100)
ANION GAP SERPL CALCULATED.3IONS-SCNC: 5 MMOL/L (ref 4–13)
BASOPHILS # BLD AUTO: 0.03 THOUSANDS/ÂΜL (ref 0–0.1)
BASOPHILS NFR BLD AUTO: 0 % (ref 0–1)
BUN SERPL-MCNC: 16 MG/DL (ref 5–25)
CALCIUM SERPL-MCNC: 8.9 MG/DL (ref 8.4–10.2)
CHLORIDE SERPL-SCNC: 112 MMOL/L (ref 96–108)
CO2 SERPL-SCNC: 23 MMOL/L (ref 21–32)
CREAT SERPL-MCNC: 1.21 MG/DL (ref 0.6–1.3)
EOSINOPHIL # BLD AUTO: 0.05 THOUSAND/ÂΜL (ref 0–0.61)
EOSINOPHIL NFR BLD AUTO: 1 % (ref 0–6)
ERYTHROCYTE [DISTWIDTH] IN BLOOD BY AUTOMATED COUNT: 15.6 % (ref 11.6–15.1)
EST. AVERAGE GLUCOSE BLD GHB EST-MCNC: 220 MG/DL
GFR SERPL CREATININE-BSD FRML MDRD: 42 ML/MIN/1.73SQ M
GLUCOSE SERPL-MCNC: 136 MG/DL (ref 65–140)
GLUCOSE SERPL-MCNC: 159 MG/DL (ref 65–140)
GLUCOSE SERPL-MCNC: 160 MG/DL (ref 65–140)
GLUCOSE SERPL-MCNC: 166 MG/DL (ref 65–140)
HBA1C MFR BLD: 9.3 %
HCT VFR BLD AUTO: 32.3 % (ref 34.8–46.1)
HGB BLD-MCNC: 10.5 G/DL (ref 11.5–15.4)
IMM GRANULOCYTES # BLD AUTO: 0.03 THOUSAND/UL (ref 0–0.2)
IMM GRANULOCYTES NFR BLD AUTO: 0 % (ref 0–2)
LYMPHOCYTES # BLD AUTO: 2.06 THOUSANDS/ÂΜL (ref 0.6–4.47)
LYMPHOCYTES NFR BLD AUTO: 29 % (ref 14–44)
MCH RBC QN AUTO: 28.1 PG (ref 26.8–34.3)
MCHC RBC AUTO-ENTMCNC: 32.5 G/DL (ref 31.4–37.4)
MCV RBC AUTO: 86 FL (ref 82–98)
MONOCYTES # BLD AUTO: 0.56 THOUSAND/ÂΜL (ref 0.17–1.22)
MONOCYTES NFR BLD AUTO: 8 % (ref 4–12)
NEUTROPHILS # BLD AUTO: 4.48 THOUSANDS/ÂΜL (ref 1.85–7.62)
NEUTS SEG NFR BLD AUTO: 62 % (ref 43–75)
NRBC BLD AUTO-RTO: 0 /100 WBCS
PLATELET # BLD AUTO: 268 THOUSANDS/UL (ref 149–390)
PMV BLD AUTO: 11.5 FL (ref 8.9–12.7)
POTASSIUM SERPL-SCNC: 3.6 MMOL/L (ref 3.5–5.3)
RBC # BLD AUTO: 3.74 MILLION/UL (ref 3.81–5.12)
SODIUM SERPL-SCNC: 140 MMOL/L (ref 135–147)
VIT B12 SERPL-MCNC: 858 PG/ML (ref 180–914)
WBC # BLD AUTO: 7.21 THOUSAND/UL (ref 4.31–10.16)

## 2025-06-08 PROCEDURE — 82306 VITAMIN D 25 HYDROXY: CPT | Performed by: INTERNAL MEDICINE

## 2025-06-08 PROCEDURE — 83036 HEMOGLOBIN GLYCOSYLATED A1C: CPT | Performed by: INTERNAL MEDICINE

## 2025-06-08 PROCEDURE — 80048 BASIC METABOLIC PNL TOTAL CA: CPT | Performed by: INTERNAL MEDICINE

## 2025-06-08 PROCEDURE — 82948 REAGENT STRIP/BLOOD GLUCOSE: CPT

## 2025-06-08 PROCEDURE — 85025 COMPLETE CBC W/AUTO DIFF WBC: CPT | Performed by: INTERNAL MEDICINE

## 2025-06-08 PROCEDURE — 82607 VITAMIN B-12: CPT | Performed by: INTERNAL MEDICINE

## 2025-06-08 PROCEDURE — 36415 COLL VENOUS BLD VENIPUNCTURE: CPT | Performed by: INTERNAL MEDICINE

## 2025-06-08 PROCEDURE — 99233 SBSQ HOSP IP/OBS HIGH 50: CPT | Performed by: PHYSICIAN ASSISTANT

## 2025-06-08 RX ORDER — ERGOCALCIFEROL 1.25 MG/1
50000 CAPSULE, LIQUID FILLED ORAL WEEKLY
Status: DISCONTINUED | OUTPATIENT
Start: 2025-06-08 | End: 2025-06-10 | Stop reason: HOSPADM

## 2025-06-08 RX ADMIN — HYDROXYZINE HYDROCHLORIDE 25 MG: 25 TABLET, FILM COATED ORAL at 18:45

## 2025-06-08 RX ADMIN — INSULIN GLARGINE 7 UNITS: 100 INJECTION, SOLUTION SUBCUTANEOUS at 22:05

## 2025-06-08 RX ADMIN — QUETIAPINE FUMARATE 25 MG: 25 TABLET ORAL at 21:25

## 2025-06-08 RX ADMIN — CYANOCOBALAMIN TAB 500 MCG 1000 MCG: 500 TAB at 08:46

## 2025-06-08 RX ADMIN — ISOSORBIDE MONONITRATE 30 MG: 30 TABLET, EXTENDED RELEASE ORAL at 08:45

## 2025-06-08 RX ADMIN — INSULIN LISPRO 1 UNITS: 100 INJECTION, SOLUTION INTRAVENOUS; SUBCUTANEOUS at 17:50

## 2025-06-08 RX ADMIN — MIRTAZAPINE 15 MG: 15 TABLET, FILM COATED ORAL at 00:30

## 2025-06-08 RX ADMIN — DILTIAZEM HYDROCHLORIDE 120 MG: 120 CAPSULE, COATED, EXTENDED RELEASE ORAL at 08:46

## 2025-06-08 RX ADMIN — ERGOCALCIFEROL 50000 UNITS: 1.25 CAPSULE, LIQUID FILLED ORAL at 15:46

## 2025-06-08 RX ADMIN — Medication 3 MG: at 21:24

## 2025-06-08 RX ADMIN — HYDRALAZINE HYDROCHLORIDE 5 MG: 20 INJECTION, SOLUTION INTRAMUSCULAR; INTRAVENOUS at 20:23

## 2025-06-08 RX ADMIN — APIXABAN 5 MG: 5 TABLET, FILM COATED ORAL at 18:45

## 2025-06-08 RX ADMIN — ASPIRIN 81 MG: 81 TABLET ORAL at 08:47

## 2025-06-08 RX ADMIN — MIRTAZAPINE 15 MG: 15 TABLET, FILM COATED ORAL at 22:02

## 2025-06-08 RX ADMIN — CARVEDILOL 6.25 MG: 6.25 TABLET, FILM COATED ORAL at 08:48

## 2025-06-08 RX ADMIN — CARVEDILOL 6.25 MG: 6.25 TABLET, FILM COATED ORAL at 15:47

## 2025-06-08 RX ADMIN — INSULIN LISPRO 1 UNITS: 100 INJECTION, SOLUTION INTRAVENOUS; SUBCUTANEOUS at 22:17

## 2025-06-08 RX ADMIN — LEVOTHYROXINE SODIUM 75 MCG: 75 TABLET ORAL at 08:45

## 2025-06-08 RX ADMIN — APIXABAN 5 MG: 5 TABLET, FILM COATED ORAL at 08:47

## 2025-06-08 RX ADMIN — INSULIN LISPRO 1 UNITS: 100 INJECTION, SOLUTION INTRAVENOUS; SUBCUTANEOUS at 13:21

## 2025-06-08 RX ADMIN — FLUTICASONE PROPIONATE 1 SPRAY: 50 SPRAY, METERED NASAL at 08:47

## 2025-06-08 NOTE — ASSESSMENT & PLAN NOTE
No new symptoms regarding this.  Saturating well on room air.  Eliquis- completed the 10 mg initial doses and is now on 5mg BID

## 2025-06-08 NOTE — ASSESSMENT & PLAN NOTE
Blood pressure on the higher side in ER, agitation likely contributing  Continue home dose Cardizem and Coreg.  Has PRN hydralazine order

## 2025-06-08 NOTE — ASSESSMENT & PLAN NOTE
Lab Results   Component Value Date    HGBA1C 11.1 (H) 03/07/2025       Recent Labs     06/07/25  1512 06/07/25  1808 06/07/25  2154   POCGLU 169* 143* 169*       Blood Sugar Average: Last 72 hrs:  (P) [id number]    Continue home dose Lantus.  Insulin Sliding scale and Accu-Cheks.  Check HbA1c.  diabetic diet.  hypolycemia protocol.

## 2025-06-08 NOTE — ASSESSMENT & PLAN NOTE
Lab Results   Component Value Date    EGFR 42 06/08/2025    EGFR 38 06/07/2025    EGFR 39 06/01/2025    CREATININE 1.21 06/08/2025    CREATININE 1.32 (H) 06/07/2025    CREATININE 1.30 06/01/2025     Baseline creatinine seems between 1.1-1.4  Currently at baseline  Trend BMP.

## 2025-06-08 NOTE — ASSESSMENT & PLAN NOTE
Was recently in [facility] ER last week when she was admitted for PE and DVT and was discharged on p.o. anticoagulation.  During that hospitalization and since discharge, patient has been having intermittent episodes of cognitive decline and behavioral changes.  I spoke with daughter again today in detail who that patient has been having fluctuating mental status at home.  With inability to sleep at night and she is verbally aggressive and very occasionally physically aggressive towards her . Symptoms always are worse at night. Daughter states she has been becoming more anxious and even paranoid at times. It's getting harder to get her to take her medications at home.  Patient's  and kids are concerned about patient's safety and ability to take care of her medical conditions by herself as per daughter.  She was seen by neuropsychiatry on recent admission at [place] and cording to neuropsychiatry, patient was deemed to have capacity to make medical decision.  As per daughter, patient failed capacity evaluation at [facility] by neuropsychiatrist there earlier this year.  At the time of her recent admission, recommendations were made for rehab at discharge but patient refused and because she was determined to have capacity she was sent home. Daughter does state that patient was briefly at a short term rehab at [facility] not that long ago but was so insistent on going home that she was discharged after only a few days  Patient was evaluated by geriatrics and is on Remeron and Seroquel but as per daughter, patient does not take her medications regularly at home.    PLAN  CT head- no acute abnormalities  UA not indicative of infection  Geriatrics re-consulted  Continue home dose Remeron, Seroquel and melatonin.  Delirium precautions.  Check vitamin B12, vitamin D- pending  When Patient becomes agitated or confused, she cannot leave AMA without discussion with her daughter or  .  PT OT evaluation. Requested cog eval with OT   consult.  Daughter and family is requesting placement for patient
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
## 2025-06-09 PROBLEM — E44.0 MODERATE PROTEIN-CALORIE MALNUTRITION (HCC): Status: ACTIVE | Noted: 2025-06-09

## 2025-06-09 LAB
GLUCOSE SERPL-MCNC: 126 MG/DL (ref 65–140)
GLUCOSE SERPL-MCNC: 130 MG/DL (ref 65–140)
GLUCOSE SERPL-MCNC: 183 MG/DL (ref 65–140)
GLUCOSE SERPL-MCNC: 199 MG/DL (ref 65–140)

## 2025-06-09 PROCEDURE — 97163 PT EVAL HIGH COMPLEX 45 MIN: CPT

## 2025-06-09 PROCEDURE — 82948 REAGENT STRIP/BLOOD GLUCOSE: CPT

## 2025-06-09 PROCEDURE — 99223 1ST HOSP IP/OBS HIGH 75: CPT | Performed by: FAMILY MEDICINE

## 2025-06-09 PROCEDURE — 97116 GAIT TRAINING THERAPY: CPT

## 2025-06-09 PROCEDURE — 99232 SBSQ HOSP IP/OBS MODERATE 35: CPT | Performed by: PHYSICIAN ASSISTANT

## 2025-06-09 PROCEDURE — 97129 THER IVNTJ 1ST 15 MIN: CPT

## 2025-06-09 PROCEDURE — 97167 OT EVAL HIGH COMPLEX 60 MIN: CPT

## 2025-06-09 RX ORDER — GABAPENTIN 100 MG/1
100 CAPSULE ORAL ONCE
Status: COMPLETED | OUTPATIENT
Start: 2025-06-09 | End: 2025-06-09

## 2025-06-09 RX ORDER — MIRTAZAPINE 15 MG/1
30 TABLET, FILM COATED ORAL
Status: DISCONTINUED | OUTPATIENT
Start: 2025-06-09 | End: 2025-06-10 | Stop reason: HOSPADM

## 2025-06-09 RX ORDER — GABAPENTIN 100 MG/1
100 CAPSULE ORAL 2 TIMES DAILY
Status: DISCONTINUED | OUTPATIENT
Start: 2025-06-09 | End: 2025-06-10 | Stop reason: HOSPADM

## 2025-06-09 RX ORDER — ECHINACEA PURPUREA EXTRACT 125 MG
1 TABLET ORAL
Status: DISCONTINUED | OUTPATIENT
Start: 2025-06-09 | End: 2025-06-10 | Stop reason: HOSPADM

## 2025-06-09 RX ADMIN — CARVEDILOL 6.25 MG: 6.25 TABLET, FILM COATED ORAL at 16:24

## 2025-06-09 RX ADMIN — Medication 3 MG: at 21:21

## 2025-06-09 RX ADMIN — INSULIN LISPRO 2 UNITS: 100 INJECTION, SOLUTION INTRAVENOUS; SUBCUTANEOUS at 11:54

## 2025-06-09 RX ADMIN — CYANOCOBALAMIN TAB 500 MCG 1000 MCG: 500 TAB at 08:42

## 2025-06-09 RX ADMIN — ISOSORBIDE MONONITRATE 30 MG: 30 TABLET, EXTENDED RELEASE ORAL at 08:43

## 2025-06-09 RX ADMIN — DILTIAZEM HYDROCHLORIDE 120 MG: 120 CAPSULE, COATED, EXTENDED RELEASE ORAL at 08:42

## 2025-06-09 RX ADMIN — INSULIN GLARGINE 7 UNITS: 100 INJECTION, SOLUTION SUBCUTANEOUS at 21:20

## 2025-06-09 RX ADMIN — GABAPENTIN 100 MG: 100 CAPSULE ORAL at 15:04

## 2025-06-09 RX ADMIN — MIRTAZAPINE 30 MG: 15 TABLET, FILM COATED ORAL at 21:21

## 2025-06-09 RX ADMIN — QUETIAPINE FUMARATE 25 MG: 25 TABLET ORAL at 21:21

## 2025-06-09 RX ADMIN — APIXABAN 5 MG: 5 TABLET, FILM COATED ORAL at 16:24

## 2025-06-09 RX ADMIN — LEVOTHYROXINE SODIUM 75 MCG: 75 TABLET ORAL at 08:46

## 2025-06-09 RX ADMIN — ASPIRIN 81 MG: 81 TABLET ORAL at 08:43

## 2025-06-09 RX ADMIN — CARVEDILOL 6.25 MG: 6.25 TABLET, FILM COATED ORAL at 08:46

## 2025-06-09 RX ADMIN — GABAPENTIN 100 MG: 100 CAPSULE ORAL at 12:57

## 2025-06-09 RX ADMIN — GABAPENTIN 100 MG: 100 CAPSULE ORAL at 21:22

## 2025-06-09 RX ADMIN — INSULIN LISPRO 1 UNITS: 100 INJECTION, SOLUTION INTRAVENOUS; SUBCUTANEOUS at 21:23

## 2025-06-09 RX ADMIN — APIXABAN 5 MG: 5 TABLET, FILM COATED ORAL at 08:43

## 2025-06-09 NOTE — MALNUTRITION/BMI
This medical record reflects one or more clinical indicators suggestive of malnutrition and/or morbid obesity.    Malnutrition Findings:   Adult Malnutrition type: Chronic illness  Adult Degree of Malnutrition: Malnutrition of moderate degree  Malnutrition Characteristics: Inadequate energy, Weight loss, Muscle loss              360 Statement: Protein calorie malnutrition r/t inadequate intake as evidenced by <75% energy intake compared to estimated energy needs > 1 month, 12% unintentional weight loss over 4 months, and muscle wasting to temples; currently treated with oral diet. Pt declined oral nutrition supplements.    BMI Findings:           Body mass index is 30.83 kg/m².     See Nutrition note dated 6/9/2025  for additional details.  Completed nutrition assessment is viewable in the nutrition documentation.

## 2025-06-09 NOTE — ASSESSMENT & PLAN NOTE
Lab Results   Component Value Date    EGFR 42 06/08/2025    EGFR 38 06/07/2025    EGFR 39 06/01/2025    CREATININE 1.21 06/08/2025    CREATININE 1.32 (H) 06/07/2025    CREATININE 1.30 06/01/2025     Creatinine stable.  Will avoid nephrotoxic medication.  Encourage po hydration.  Will monitor BMP.

## 2025-06-09 NOTE — PROGRESS NOTES
Progress Note - Hospitalist   Name: Yajaira Marsh 79 y.o. female I MRN: [id number]  Unit/Bed#: W -01 I Date of Admission: 6/7/2025   Date of Service: 6/9/2025 I Hospital Day: 2    Assessment & Plan  Cognitive and behavioral changes  Brought in by family due to concerns of waxing and waning mentation and agitation particularly later in the day suggestive of sundowning, with difficulty caring for patient in the home setting  She was seen by neuropsychiatry on recent admission at [place] --deemed to have capacity  Patient was evaluated by geriatrics and is on Remeron and Seroquel but as per daughter, patient does not take her medications regularly at home.  Geriatrics is recommending addition of gabapentin plus increased Remeron  Seems to have significant component of anxiety  Per PT and OT consultations-- level 3 resource intensity.  Case management provided patient's daughter information about additional resources.  Likely would be a good candidate for assisted living but financial concerns at play    acute/subacute pulmonary embolus and DVT ([facility])  Recently diagnosed last month -no new symptoms regarding this.  Saturating well on room air.  Eliquis- completed the 10 mg initial doses and is now on 5mg BID    DM2 (diabetes mellitus, type 2) ([facility])  Lab Results   Component Value Date    HGBA1C 9.3 (H) 06/08/2025     Recent Labs     06/08/25  1748 06/08/25  2201 06/09/25  0716 06/09/25  1042   POCGLU 160* 166* 130 199*   Blood Sugar Average: Last 72 hrs:  (P) 161.875  A1c reflects poor control but improving  Continue home dose Lantus plus SSI    Stage 3b chronic kidney disease (HCC)  Lab Results   Component Value Date    EGFR 42 06/08/2025    EGFR 38 06/07/2025    EGFR 39 06/01/2025    CREATININE 1.21 06/08/2025    CREATININE 1.32 (H) 06/07/2025    CREATININE 1.30 06/01/2025     Baseline creatinine seems between 1.1-1.4  Trend BMP.  CAD s/p CABG  Denies any chest pain.  continue home dose aspirin, statin  "Coreg.  Essential hypertension  Blood pressure on the higher side in ER, agitation likely contributing  Continue home dose Cardizem and Coreg.  Has PRN hydralazine order    VTE Pharmacologic Prophylaxis:   Eliquis    Mobility:   Basic Mobility Inpatient Raw Score: 19  JH-HLM Goal: 6: Walk 10 steps or more  JH-HLM Achieved: 7: Walk 25 feet or more  JH-HLM Goal achieved. Continue to encourage appropriate mobility.    Patient Centered Rounds: I performed bedside rounds with nursing staff today.   Discussions with Specialists or Other Care Team Provider: Case management, geriatrics    Education and Discussions with Family / Patient: Updated  (daughter) via phone.    Current Length of Stay: 2 day(s)  Current Patient Status: Inpatient   Certification Statement: The patient will continue to require additional inpatient hospital stay due to pending evaluation and assistance in behavioral management by geriatrics  Discharge Plan: Anticipate discharge tomorrow to home.    Code Status: Level 1 - Full Code    Subjective   I evaluated patient in the morning at which point she said \"I am so confused,\" \"my memory is off.\" Patient told me she is afraid to drink because she is \"afraid to choke.\"  She does admit to having anxiety.    Her daughter reported that she is always good in the morning but gets worse as the day goes on and they are very concerned with how to manage her later in the day.    Objective :  Temp:  [97.7 °F (36.5 °C)-98.4 °F (36.9 °C)] 98.3 °F (36.8 °C)  HR:  [58-81] 58  BP: (125-200)/(51-87) 130/54  Resp:  [16-18] 17  SpO2:  [96 %-99 %] 96 %  O2 Device: None (Room air)    Body mass index is 30.83 kg/m².     Input and Output Summary (last 24 hours):     Intake/Output Summary (Last 24 hours) at 6/9/2025 1353  Last data filed at 6/9/2025 1241  Gross per 24 hour   Intake 480 ml   Output 1 ml   Net 479 ml       Physical Exam  Vitals reviewed.   Constitutional:       General: She is not in acute " distress.     Appearance: Normal appearance. She is obese. She is not ill-appearing, toxic-appearing or diaphoretic.      Comments: Patient seen sitting up at edge of bed   HENT:      Nose: No congestion.     Cardiovascular:      Rate and Rhythm: Normal rate and regular rhythm.      Heart sounds: No murmur heard.  Pulmonary:      Effort: No respiratory distress.      Breath sounds: No stridor. No wheezing, rhonchi or rales.     Skin:     Coloration: Skin is not jaundiced or pale.      Findings: No bruising, erythema, lesion or rash.     Neurological:      Mental Status: She is alert.      Comments: Awake alert interactive.  No facial asymmetry.  No tremor.  Patient is appropriately oriented x 3.  She is able to tell me the current president.  She is able to spell world backwards   Psychiatric:      Comments: Mild anxiously appearing but very pleasant and cooperative         Lines/Drains:          Lab Results: I have reviewed the following results:   Results from last 7 days   Lab Units 06/08/25  0631   WBC Thousand/uL 7.21   HEMOGLOBIN g/dL 10.5*   HEMATOCRIT % 32.3*   PLATELETS Thousands/uL 268   SEGS PCT % 62   LYMPHO PCT % 29   MONO PCT % 8   EOS PCT % 1     Results from last 7 days   Lab Units 06/08/25  0631 06/07/25  1214   SODIUM mmol/L 140 139   POTASSIUM mmol/L 3.6 4.0   CHLORIDE mmol/L 112* 109*   CO2 mmol/L 23 19*   BUN mg/dL 16 19   CREATININE mg/dL 1.21 1.32*   ANION GAP mmol/L 5 11   CALCIUM mg/dL 8.9 9.5   ALBUMIN g/dL  --  3.8   TOTAL BILIRUBIN mg/dL  --  0.60   ALK PHOS U/L  --  76   ALT U/L  --  11   AST U/L  --  14   GLUCOSE RANDOM mg/dL 136 249*         Results from last 7 days   Lab Units 06/09/25  1042 06/09/25  0716 06/08/25  2201 06/08/25  1748 06/08/25  1319 06/07/25  2154 06/07/25  1808 06/07/25  1512 06/02/25  1657   POC GLUCOSE mg/dl 199* 130 166* 160* 159* 169* 143* 169* 145*     Results from last 7 days   Lab Units 06/08/25  0631   HEMOGLOBIN A1C % 9.3*           Recent Cultures (last 7  days):           Last 24 Hours Medication List:     Current Facility-Administered Medications:     apixaban (ELIQUIS) tablet 5 mg, BID    aspirin (ECOTRIN LOW STRENGTH) EC tablet 81 mg, Daily    carvedilol (COREG) tablet 6.25 mg, BID With Meals    cyanocobalamin (VITAMIN B-12) tablet 1,000 mcg, Daily    diltiazem (CARDIZEM CD) 24 hr capsule 120 mg, Daily    ergocalciferol (VITAMIN D2) capsule 50,000 Units, Weekly    fluticasone (FLONASE) 50 mcg/act nasal spray 1 spray, Daily    hydrALAZINE (APRESOLINE) injection 5 mg, Q6H PRN    [Held by provider] hydrOXYzine HCL (ATARAX) tablet 25 mg, Q6H PRN    insulin glargine (LANTUS) subcutaneous injection 7 Units 0.07 mL, HS    insulin lispro (HumALOG/ADMELOG) 100 units/mL subcutaneous injection 1-5 Units, HS    insulin lispro (HumALOG/ADMELOG) 100 units/mL subcutaneous injection 1-6 Units, TID AC **AND** Fingerstick Glucose (POCT), TID AC    isosorbide mononitrate (IMDUR) 24 hr tablet 30 mg, Daily    levothyroxine tablet 75 mcg, Daily    melatonin tablet 3 mg, HS    mirtazapine (REMERON) tablet 15 mg, HS    QUEtiapine (SEROquel) tablet 25 mg, HS    Administrative Statements   Today, Patient Was Seen By: Ale Gutierrez PA-C      **Please Note: This note may have been constructed using a voice recognition system.**

## 2025-06-09 NOTE — PLAN OF CARE
Problem: PHYSICAL THERAPY ADULT  Goal: Performs mobility at highest level of function for planned discharge setting.  See evaluation for individualized goals.  Description: Treatment/Interventions: Functional transfer training, LE strengthening/ROM, Endurance training, Therapeutic exercise, Cognitive reorientation, Patient/family training, Compensatory technique education, Gait training, Bed mobility, Equipment eval/education  Equipment Recommended: Walker       See flowsheet documentation for full assessment, interventions and recommendations.  6/9/2025 1249 by Neisha Ernandez PT  Note:    Problem List: Impaired balance, Decreased mobility, Decreased cognition, Decreased safety awareness  Assessment: Yajaira Marsh is a 79 y.o. Female who presents [facility] due to sOB and diagnosis of cognitive and behavorial changes. Orders for PT eval and treat received. Comorbidities affecting pt's functional mobility at time of evaluation include: DM, HTN, CKD, recent PE, lumbar spinal stenosis, MADAN, ambulatory dysfunction, diabetic polyneuropathy. Personal factors affecting DC include: decreased cognition and positive fall history. At baseline, pt mobilizes independently w/ no AD vs cane prn, and w/ 1-4 fall(s) in the previous 6 months. Upon evaluation, pt presents w/ the following deficits: impaired balance, impaired cognition, and gait deviations. Pt currently requires  mod I for bed mobility, supervision for transfers, supervision w/ no AD for ambulation. Pt's clinical presentation is unstable/unpredictable due to abnormal lab values, need for input for mobility technique, need for input for task focus, recent h/o falls, ongoing medical management. From a PT/mobility standpoint given the above findings, DC recommendation is level: III (Minimum Rehab Resource Intensity). During current admission, pt will benefit from continued skilled inpatient PT in the acute care setting in order to address the above deficits  and to maximize function and mobility prior to DC from acute care.        Rehab Resource Intensity Level, PT: III (Minimum Resource Intensity)    See flowsheet documentation for full assessment.

## 2025-06-09 NOTE — ASSESSMENT & PLAN NOTE
Recently diagnosed last month -no new symptoms regarding this.  Saturating well on room air.  Eliquis- completed the 10 mg initial doses and is now on 5mg BID

## 2025-06-09 NOTE — OCCUPATIONAL THERAPY NOTE
Occupational Therapy Evaluation + Cognitive Evaluation (ACLS)     Patient Name: Yajaira Marsh  Today's Date: 6/9/2025  Problem List  Principal Problem:    Cognitive and behavioral changes  Active Problems:    CAD s/p CABG    Hypothyroidism    Type 2 diabetes mellitus with hyperglycemia, with long-term current use of insulin (HCC)    Essential hypertension    Stage 3b chronic kidney disease (HCC)    Recent diagnosis pulmonary embolus and DVT (HCC)    Past Medical History  Past Medical History[1]  Past Surgical History  Past Surgical History[2]          06/09/25 0915   OT Last Visit   OT Visit Date 06/09/25   Note Type   Note type Evaluation   Pain Assessment   Pain Assessment Tool FLACC   Pain Location/Orientation Location: Abdomen   Pain Onset/Description Onset: Ongoing;Descriptor: Aching   Pain Rating: FLACC (Rest) - Face 0   Pain Rating: FLACC (Rest) - Legs 0   Pain Rating: FLACC (Rest) - Activity 0   Pain Rating: FLACC (Rest) - Cry 1   Pain Rating: FLACC (Rest) - Consolability 0   Score: FLACC (Rest) 1   Pain Rating: FLACC (Activity) - Face 0   Pain Rating: FLACC (Activity) - Legs 0   Pain Rating: FLACC (Activity) - Activity 0   Pain Rating: FLACC (Activity) - Cry 0   Pain Rating: FLACC (Activity) - Consolability 0   Score: FLACC (Activity) 0   Restrictions/Precautions   Weight Bearing Precautions Per Order No   Other Precautions Cognitive;Chair Alarm;Bed Alarm;Fall Risk   Home Living   Type of Home House  (55+ community)   Home Layout One level;Able to live on main level with bedroom/bathroom;Performs ADLs on one level;Access  (0 RAFA)   Bathroom Shower/Tub Walk-in shower   Bathroom Toilet Raised   Bathroom Equipment Grab bars in shower;Built-in shower seat   Home Equipment Cane;Walker  (reports having parents old DME)   Prior Function   Level of [place] Independent with functional mobility;Independent with ADLs;Needs assistance with IADLS   Lives With Spouse   Receives Help From Family  (daughter-  reports daughter is busy often)   IADLs Independent with meal prep;Independent with medication management;Family/Friend/Other provides transportation  (manages medications (I). Spouse drives.)   Falls in the last 6 months 1 to 4  (1 per pt)   Vocational Retired   Comments cares for dog at home. Reports no AD most of the time, SPC used PRN.   Lifestyle   Autonomy PTA pt is (I) c ADLs, A with IADLs. Lives c spouse. (-) driving, + fall hx   Reciprocal Relationships supportive spouse, daughter   Service to Others retired   General   Additional Pertinent History Pt initially presenting to ED d/t SOB with recent PE and DVT. However admitted d/t cognitive and behavioral changes, ongoing sundowning. Hx of ambulatory dysfunction, CABG, CKD, DM2, polyneuropathy, lumbar radiculopathy, + fall hx, MADAN.   Family/Caregiver Present No   Subjective   Subjective very anxious during evauation and cognitive testing session. Provided distractors without improvement. Notified SLIM AP and RN   ADL   Where Assessed   (recliner)   Eating Assistance 6  Modified independent   Grooming Assistance 5  Supervision/Setup   UB Bathing Assistance 5  Supervision/Setup   LB Bathing Assistance 5  Supervision/Setup   UB Dressing Assistance 5  Supervision/Setup   LB Dressing Assistance 4  Minimal Assistance   LB Dressing Deficit Supervision/safety;Increased time to complete;Setup;Thread RLE into underwear;Pull up over hips   Toileting Assistance  5  Supervision/Setup   Functional Assistance 5  Supervision/Setup   Bed Mobility   Additional Comments seated at EOB with PT upon arrival, OOB in recliner at end of session   Transfers   Sit to Stand 5  Supervision   Additional items Assist x 1;Increased time required;Verbal cues   Stand to Sit 5  Supervision   Additional items Assist x 1;Increased time required;Verbal cues   Additional Comments transfers completed both without AD and with RW   Functional Mobility   Functional Mobility 5  Supervision    Additional Comments household distances within room and hallway x2 2 trials, both without AD and c RW. Improved steadiness c RW. Demonstrates appropriate RW management   Additional items Rolling walker  (no AD)   Balance   Static Sitting Good   Dynamic Sitting Fair +   Static Standing Fair   Dynamic Standing Fair   Activity Tolerance   Activity Tolerance Patient limited by fatigue  (anxiety)   Medical Staff Made Aware Dr [facility], CM Taylor, PT Neisha.   Nurse Made Aware G[facility] Brandt pre/post   RUE Assessment   RUE Assessment WFL  (MMT gorssly 4/5 based on functional assessment)   LUE Assessment   LUE Assessment WFL  (MMT 4/5 based on functional assessment)   Hand Function   Gross Motor Coordination Functional   Fine Motor Coordination Functional   Sensation   Light Touch Partial deficits in the RUE;Partial deficits in the LUE  (reports baseline polyneuropathy in fingertips)   Vision-Basic Assessment   Current Vision Wears glasses all the time   Vision - Complex Assessment   Acuity Able to read normal print without difficulty   Cognition   Overall Cognitive Status Impaired   Arousal/Participation Alert;Cooperative  (anxious)   Attention Attends with cues to redirect   Orientation Level Oriented to person;Oriented to place;Oriented to time;Disoriented to situation   Memory Decreased short term memory;Decreased recall of precautions   Following Commands Follows one step commands without difficulty   Comments appropriate through conversation. Limited insight, safety awareness, problem solving, attention. Per chart pt has been sundowning at home. Per EMR pt has scored 24/30 on MoCA on 6/2/25 however unable to find testing in media or further details. See treatment session after IE for ACLS details.   Cognition Assessment Tools ACLS   Score (S)  3.4   Assessment   Limitation Decreased ADL status;Decreased Safe judgement during ADL;Decreased cognition;Decreased endurance;Decreased self-care  trans;Decreased high-level ADLs  (insight, problem solving, safety awareness, attention, memory)   Prognosis Good   Assessment Patient is a 79 y.o. female seen for OT evaluation and cognitive evaluation  at [facility] following admission on 6/7/2025  s/p Cognitive and behavioral changes. Please see above for comprehensive list of comorbidities and significant PMHx impacting functional performance.  Upon initial evaluation, pt appears to be performing below baseline functional status.   Occupational performance is affected by the following deficits: endurance , decreased balance , decreased activity tolerance , impaired memory , attention to task, impaired judgement and problem solving , decreased emotional regulation and coping skills , impaired safety awareness , and impaired learning ability d/t current cognitive status . Personal/Environmental factors impacting D/C include: decreased insight toward deficits  and baseline cognitive deficits. Supporting factors include: support system available Patient would benefit from OT services within the acute care setting to maximize level of functional independence in the following areas self-care transfers, functional mobility, and ADLs.  From OT standpoint, recommendation at time of D/C would be Level 3: minimum resource intensity  WITH 24/7 supervision per ACLS recommendations.   Goals   Patient Goals no specific goals stated by patient. Family requesting 24/7 sup per EMR   Plan   Treatment Interventions ADL retraining;Functional transfer training;Endurance training;Cognitive reorientation;Patient/family training;Equipment evaluation/education;Compensatory technique education;Activityengagement;Continued evaluation  (ACLS)   Goal Expiration Date 06/19/25   OT Treatment Day 0   OT Frequency 2-3x/wk   Discharge Recommendation   Rehab Resource Intensity Level, OT III (Minimum Resource Intensity)  (c 24/7 supervision per ACLS scoring recommendations)    Additional Comments  The patient's raw score on the AM-PAC Daily Activity Inpatient Short Form is 19. A raw score of greater than or equal to 19 suggests the patient may benefit from discharge to home. Please refer to the recommendation of the Occupational Therapist for safe discharge planning.   AM-PAC Daily Activity Inpatient   Lower Body Dressing 3   Bathing 3   Toileting 3   Upper Body Dressing 3   Grooming 3   Eating 4   Daily Activity Raw Score 19   Daily Activity Standardized Score (Calc for Raw Score >=11) 40.22   AM-PAC Applied Cognition Inpatient   Following a Speech/Presentation 2   Understanding Ordinary Conversation 3   Taking Medications 2   Remembering Where Things Are Placed or Put Away 3   Remembering List of 4-5 Errands 2   Taking Care of Complicated Tasks 2   Applied Cognition Raw Score 14   Applied Cognition Standardized Score 32.02   Enrique Cognitive Level   Enrique Cognitive Level Score 3.4   Enrique Cognitive Score Recommendation (S)  24 hour assistance   Enrique Cognitive Level Comments Enrique cognitive level screen (ACLS) on this date. Utilized L-ACLS d/t pt report of neuropathy in finger tips.  Glasses donned and confirmed pt able to see all components of testing appropriately. Testing completed in well lit, quiet environment with minimal distractions. Pt completed 3 running stitches without error. Unable to complete any whipstitches correctly (bringing leather from back to front on all trials). Creates error in back of lace and unable to identify and correct despite max cueing and encouragement. Creates twisted lace error and becomes increasingly anxious and frustrated attempting to correct, requests to terminate testing. Pt notably very anxious during testing- visibly dyspneic. Care team made aware. Would recommend full assistance c medication management, 24/7 supervision for pt at time of DC.    3.4    Administered Enrique Cognitive Level Screen (ACLS).  Pt scored 3.4/6.0 indicating 24 hour care  is recommended to sequence through routine steps of toileting, bathing, grooming and dressing.        Behavior:  Speaks without considering comprehension of listener.  May be distractible.  Grooming:  Spontaneously sustains actions of combing, brushing, shaving with electric razor or applying makeup.  Uses too much or too little toothpaste/makeup.  Looks at objects but fails to note effects.  Restrict access to harmful objects.  Dressing:  Selects items laid out and begins to don garments.  May pick several items and be unable to decide.  May quit before finished or don several layers.  May not pay attention to condition of garments (cleanliness or need for repair).  Bathing:  Picks up washcloth, soap, towel and wipes easy to reach body parts.  May wash in one spot, forget to use soap, may not remove all dirt or quit before task is complete.  Patient should not be left alone during bathing tasks.  Walking and exercising:  Ambulates within 2 or 3 familiar rooms to access desirable activities.  Can alter ambulation pace but is easily distracted.  May be impulsive when changing positions.  Has difficulty walking and talking at the same time.  Is at risk for falls.  Eating:  May anticipate meal times based on familiar signs (activity in kitchen).  Uses all utensils except knife.  Rate may be rushed and may eat strongly preferred items only.  Failure to observe manners may alienate others.  Check food and beverage temperature.  Cannot follow dietary restrictions.  Toileting:  Recognizes need to void and goes to familiar bathrooms.  May not close door while in bathroom.  Dons and doffs clothing slowly, may need help with unusual fasteners.  Wipes but does not check results or wipes repeatedly using excess toilet paper.  May forget to wash hands.  May leave zipper open.  May need reminders to toilet in order to avoid accidents.  Medications:  May recognize medications by color or shape when it is given daily but does not  note amounts or time of day.  Does not understand purpose of medications or side effects, may mistake for candy.  Prescription bottles need to be child proof.  Store medications in secure location away from patient. Pre-measured medications should be handed to the patient.   Use of adaptive devices: May be able to propel a wheelchair but cannot get around furniture and may get lost if allowed outside.  May not be able to utilize adaptive devices in safe manner.  May not be able to learn use of new adaptive device.  Housekeeping:  No awareness of need for housekeeping.  May  cloth and begin action of cleaning.  Does not note results and may quit when distracted.  Meal Preparation:  Does not plan for food.  May eat from refrigerator.  May be able to replicate repetitive actions for simple meal prep with supervision.  May be impulsive and require frequent redirection.  Restrict access to sharp tools and hot objects/surfaces.  Spending money:  May recognize local currency.  May hand money to another person in a familiar exchange situation; however, may not attend to amount given/received.  May not understand money is owed for services.  May loose money.  Should have assistance for all finances.  Shopping:  Follows a guide to shopping areas.  Looks in windows or at displays with no intent to purchase.  May take items without paying.  May fail to notice price or if they have enough money to pay.  Do not leave alone in shopping areas.  Laundry:  May not recognize clothing is dirty.  Has no awareness of methods of doing laundry.  May do repetitive actions but may not be able to sequence through steps of task.  Traveling:  May be able to sit for 30 minutes in a car.  May not be aware of destination.  May recognize features on a familiar route.  May attempt to enter or leave car before it is fully stopped.  Use child safety locks.  Telephone:  Able to  phone when it rings and say “hello”.  May be able to call  for another person or hang up when phone is not for them.  Not able to take messages.  May dial 1 or 2 known numbers, but may call for no reason.  May forget to hang up .  Driving:  Should not operate a motor vehicle      Additional Treatment Session   Start Time 1115   End Time 1128   Treatment Assessment Formal cognitive evaluation (ACLS) completed following IE. See ACLS section above for assessment and treatment session details   Additional Treatment Day 1   End of Consult   Education Provided Yes   Patient Position at End of Consult Bed/Chair alarm activated;All needs within reach;Bedside chair   Nurse Communication Nurse aware of consult   Goals established on initial evaluation in order to achieve pt's goal of maximizing functional independence during ADL tasks      Pt will complete UB ADLs Mod independent   for increased ADL independence within 10 days.     Pt will complete LB ADLs Mod independent   for increased ADL independence within 10 days.     Pt will complete toileting Mod independent   with use of DME for increased ADL independence within 10 days.     Pt will demonstrate proper body mechanics to complete self-care transfers and functional mobility with Mod independent  and use of LRAD for increased safety and functional independence within 10 days.     Pt will demonstrate standing tolerance of 6 min for increased activity tolerance during ADL/IADL tasks within 10 days.     Pt will demonstrate proper body mechanics and fall prevention strategies during 100% of tx sessions for increased safety awareness during ADL/IADLs    Pt will demonstrate activity tolerance of 30 min in therapeutic tasks for increased participation in meaningful activities upon D/C.    Pt will attend to treatment task or activity for 5 minutes without need for redirection to improve activity engagement within 10 days.     Pt will participate in ongoing cognitive assessments to assist with safe D/C planning and  supervision/assistance recommendations.     Pt will demonstrate OOB sitting tolerance of 2-4 hr/day for increased activity tolerance and engagement in leisure activities within 10 days.     Taylor Whatley OT              [1]   Past Medical History:  Diagnosis Date    Acute embolism and thrombosis of unspecified deep veins of unspecified lower extremity (HCC)     Last Assessed:  5/18/17    Anemia     Anosmia     Anxiety     Arthritis     Asthma     Back pain     Bilateral macular retinal edema     CAD (coronary artery disease)     Cataract     Cervical disc herniation     Cervical radiculopathy     Cervical spinal stenosis     Cervical spondylolysis     Chronic kidney disease     Chronic mastoiditis     Colon polyp     Complex endometrial hyperplasia     Depression     Diabetes mellitus (HCC)     Disease of thyroid gland     DVT (deep venous thrombosis) ([facility])     DVT (deep venous thrombosis) ([facility]) 06/16/2017    Fibromyalgia     Fibromyalgia, primary     Hyperlipidemia     Hypertension     Hypothyroid     Kidney stone     Lumbar radiculopathy     Neck pain     Obese     PONV (postoperative nausea and vomiting)     Shingles 07/01/2021    Spinal stenosis     Stomach ulcer     Thyroid disease     Toxic metabolic encephalopathy 02/11/2025    Vascular claudication ([facility]) 12/11/2017   [2]   Past Surgical History:  Procedure Laterality Date    BACK SURGERY      CARPAL TUNNEL RELEASE      CATARACT EXTRACTION      CHOLECYSTECTOMY      COLONOSCOPY      CORONARY ANGIOPLASTY      CORONARY ARTERY BYPASS GRAFT      CYSTOSCOPY N/A 6/20/2017    Procedure: CYSTOSCOPY;  Surgeon: Tacho Arnold MD;  Location: [facility];  Service: Gynecology Oncology    CYSTOSCOPY      UT LAPS TOTAL HYSTERECT 250 GM/< W/RMVL TUBE/OVARY N/A 6/20/2017    Procedure: ROBOTIC HYSTERECTOMY; BILATERAL SALPINO-OOPHERECTOMY; umbilical hernia repair.;  Surgeon: Tacho Arnold MD;  Location: [facility];  Service: Gynecology Oncology    UT REPAIR FIRST  ABDOMINAL WALL HERNIA N/A 5/12/2022    Procedure: REPAIR HERNIA INCISIONAL;  Surgeon: Horacio Scherer DO;  Location: [facility];  Service: General    TONSILECTOMY AND ADNOIDECTOMY      TONSILLECTOMY      UMBILICAL HERNIA REPAIR

## 2025-06-09 NOTE — ASSESSMENT & PLAN NOTE
Lab Results   Component Value Date    EGFR 42 06/08/2025    EGFR 38 06/07/2025    EGFR 39 06/01/2025    CREATININE 1.21 06/08/2025    CREATININE 1.32 (H) 06/07/2025    CREATININE 1.30 06/01/2025     Baseline creatinine seems between 1.1-1.4  Trend BMP.

## 2025-06-09 NOTE — ASSESSMENT & PLAN NOTE
Patient reported that she lost about 40 pounds in the last 5 months  Reports decreased appetite and she is afraid to eat as she feels that the food gets stuck in her throat.  Dietitian to follow  Follow-up with PCP and consider screening for malignancy  Patient states that she has follow-up appointment with GI as outpatient

## 2025-06-09 NOTE — PHYSICAL THERAPY NOTE
PHYSICAL THERAPY EVALUATION NOTE          Patient Name: Yajaira Marsh  Today's Date: 2025          AGE:   79 y.o.  Mrn:   [id number]  ADMIT DX:  Hallucinations [R44.3]  Shortness of breath [R06.02]  Panic disorder [F41.0]  Agitation [R45.1]  Paranoia (HCC) [F22]  Sundowning [F05]  Psychosocial stressors [Z65.8]    Past Medical History:  Past Medical History[1]    Past Surgical History:  Past Surgical History[2]  Length Of Stay: 2        PHYSICAL THERAPY EVALUATION:    Patient's identity confirmed via 2 patient identifiers (full name and ) at start of session       25 0908   PT Last Visit   PT Visit Date 25   Note Type   Note type Evaluation   Pain Assessment   Pain Assessment Tool FLACC   Pain Location/Orientation Location: Abdomen   Pain Onset/Description Descriptor: Aching   Pain Rating: FLACC (Rest) - Face 0   Pain Rating: FLACC (Rest) - Legs 0   Pain Rating: FLACC (Rest) - Activity 0   Pain Rating: FLACC (Rest) - Cry 1   Pain Rating: FLACC (Rest) - Consolability 0   Score: FLACC (Rest) 1   Pain Rating: FLACC (Activity) - Face 0   Pain Rating: FLACC (Activity) - Legs 0   Pain Rating: FLACC (Activity) - Activity 0   Pain Rating: FLACC (Activity) - Cry 0   Pain Rating: FLACC (Activity) - Consolability 0   Score: FLACC (Activity) 0   Restrictions/Precautions   Weight Bearing Precautions Per Order No   Other Precautions Cognitive;Chair Alarm;Bed Alarm;Pain;Fall Risk   Home Living   Type of Home House  (55+ community)   Home Layout One level;Performs ADLs on one level;Able to live on main level with bedroom/bathroom  (0 RAFA)   Bathroom Shower/Tub Walk-in shower   Bathroom Toilet Raised   Bathroom Equipment Grab bars in shower;Built-in shower seat   Home Equipment Cane;Walker  (reports having her parent's DME)   Prior Function   Level of [place] Independent with functional mobility;Independent with ADLs;Needs assistance with IADLS   Lives  With Spouse   Receives Help From Family  (daughter)   IADLs Independent with meal prep;Independent with medication management;Family/Friend/Other provides transportation   Falls in the last 6 months 1 to 4  (1 per pt)   Comments Pt reports at baseline she amb ind w/o AD most of the time, will use a cane prn   General   Family/Caregiver Present No   Cognition   Overall Cognitive Status Impaired   Arousal/Participation Cooperative   Attention Attends with cues to redirect   Orientation Level Oriented to person;Oriented to place;Oriented to time;Disoriented to situation   Memory Decreased short term memory;Decreased recall of precautions   Following Commands Follows one step commands without difficulty   Comments Pt ID via name and ; pt agreeable to PT eval and mobility   RLE Assessment   RLE Assessment WFL  (grossly assessed w/ functional mobility)   LLE Assessment   LLE Assessment WFL  (grossly assessed w/ functional mobility)   Vision-Basic Assessment   Current Vision Wears glasses all the time   Bed Mobility   Supine to Sit 6  Modified independent   Additional items HOB elevated;Bedrails;Increased time required   Additional Comments Able to maintain sitting balance at EOB w/ supervision   Transfers   Sit to Stand 5  Supervision   Additional items Assist x 1;Increased time required   Stand to Sit 5  Supervision   Additional items Assist x 1;Armrests;Increased time required   Ambulation/Elevation   Gait pattern Improper Weight shift;Decreased foot clearance;Short stride;Decreased hip extension   Gait Assistance 5  Supervision   Additional items Assist x 1   Assistive Device None   Distance 50'   Balance   Static Sitting Good   Dynamic Sitting Fair +   Static Standing Fair   Dynamic Standing Fair   Ambulatory Fair -   Activity Tolerance   Activity Tolerance Patient limited by fatigue   Medical Staff Made Aware Pt benefited from PT/OT care coordination w/ OT Taylor for portion of session to allow for challenge of  pt's activity tolerance, PT and OT goals were addressed individually during session   Nurse Made Aware GAMA Hernandez   Assessment   Problem List Impaired balance;Decreased mobility;Decreased cognition;Decreased safety awareness   Assessment Yajaira Marsh is a 79 y.o. Female who presents to [facility] on 6/7/25 due to sOB and diagnosis of cognitive and behavorial changes. Orders for PT eval and treat received. Comorbidities affecting pt's functional mobility at time of evaluation include: DM, HTN, CKD, recent PE, lumbar spinal stenosis, MADAN, ambulatory dysfunction, diabetic polyneuropathy. Personal factors affecting DC include: decreased cognition and positive fall history. At baseline, pt mobilizes independently w/ no AD vs cane prn, and w/ 1-4 fall(s) in the previous 6 months. Upon evaluation, pt presents w/ the following deficits: impaired balance, impaired cognition, and gait deviations. Pt currently requires  mod I for bed mobility, supervision for transfers, supervision w/ no AD for ambulation. Pt's clinical presentation is unstable/unpredictable due to abnormal lab values, need for input for mobility technique, need for input for task focus, recent h/o falls, ongoing medical management. From a PT/mobility standpoint given the above findings, DC recommendation is level: III (Minimum Rehab Resource Intensity). During current admission, pt will benefit from continued skilled inpatient PT in the acute care setting in order to address the above deficits and to maximize function and mobility prior to DC from acute care.   Goals   [facility] Expiration Date 06/19/25   Short Term Goal #1 Pt will: perform bed mobility independently to decrease pt's burden of care and increase pt's independence w/ repositioning in bed; perform transfers w/ mod I to promote OOB mobility; ambulate 250' w/ LRAD and mod I to increase pt's ambulatory endurance/tolerance; increase all balance ratings by at least 1 grade to decrease pt's risk of falls    PT Treatment Day 1   Plan   Treatment/Interventions Functional transfer training;LE strengthening/ROM;Endurance training;Therapeutic exercise;Cognitive reorientation;Patient/family training;Compensatory technique education;Gait training;Bed mobility;Equipment eval/education   PT Frequency 2-3x/wk   Discharge Recommendation   Rehab Resource Intensity Level, PT III (Minimum Resource Intensity)   Equipment Recommended Walker   Walker Package Recommended Wheeled walker   Change/add to Walker Package? No   AM-PAC Basic Mobility Inpatient   Turning in Flat Bed Without Bedrails 4   Lying on Back to Sitting on Edge of Flat Bed Without Bedrails 4   Moving Bed to Chair 3   Standing Up From Chair Using Arms 3   Walk in Room 3   Climb 3-5 Stairs With Railing 2   Basic Mobility Inpatient Raw Score 19   Basic Mobility Standardized Score 42.48   [facility] Highest Level Of Mobility   -HLM Goal 6: Walk 10 steps or more   -HLM Achieved 7: Walk 25 feet or more   Additional Treatment Session   Start Time 0915   End Time 0924   Treatment Assessment Additional PT intervention provided post eval including transfer, balance, gait, and endurance training in order to challenge pt's activity tolerance and progress pt towards her baseline level of mobility. Pt agreeable to an additional ambulation trial w/ the RW. She performed a sit<>stand transfer to/from the recliner chair w/ supervision. Using the RW, she ambulated an additional 160' w/ supervision level of assistance. Pt will continue benefit from PT to promote independence w/ functional mobility, address mobility deficits, and progress towards set goals. Recommend DC w/ level: III (Minimum Rehab Resource Intensity) when medically cleared.   Equipment Use RW   Additional Treatment Day 1   End of Consult   Patient Position at End of Consult Bedside chair;Bed/Chair alarm activated;All needs within reach       The patient's AM-PAC Basic Mobility Inpatient Short Form Raw Score is 19.  A Raw score of greater than 16 suggests the patient may benefit from discharge to home. Please also refer to the recommendation of the Physical Therapist for safe discharge planning.    Pt will benefit from skilled inpatient PT during this admission in order to facilitate progress towards goals and to maximize functional independence prior to DC      DC rec: level III (Minimum Rehab Resource Intensity)        Neisha Ernandez, PT, DPT  06/09/25               [1]   Past Medical History:  Diagnosis Date    Acute embolism and thrombosis of unspecified deep veins of unspecified lower extremity (HCC)     Last Assessed:  5/18/17    Anemia     Anosmia     Anxiety     Arthritis     Asthma     Back pain     Bilateral macular retinal edema     CAD (coronary artery disease)     Cataract     Cervical disc herniation     Cervical radiculopathy     Cervical spinal stenosis     Cervical spondylolysis     Chronic kidney disease     Chronic mastoiditis     Colon polyp     Complex endometrial hyperplasia     Depression     Diabetes mellitus (HCC)     Disease of thyroid gland     DVT (deep venous thrombosis) (HCC)     DVT (deep venous thrombosis) (HCC) 06/16/2017    Fibromyalgia     Fibromyalgia, primary     Hyperlipidemia     Hypertension     Hypothyroid     Kidney stone     Lumbar radiculopathy     Neck pain     Obese     PONV (postoperative nausea and vomiting)     Shingles 07/01/2021    Spinal stenosis     Stomach ulcer     Thyroid disease     Toxic metabolic encephalopathy 02/11/2025    Vascular claudication (HCC) 12/11/2017   [2]   Past Surgical History:  Procedure Laterality Date    BACK SURGERY      CARPAL TUNNEL RELEASE      CATARACT EXTRACTION      CHOLECYSTECTOMY      COLONOSCOPY      CORONARY ANGIOPLASTY      CORONARY ARTERY BYPASS GRAFT      CYSTOSCOPY N/A 6/20/2017    Procedure: CYSTOSCOPY;  Surgeon: Tacho Arnold MD;  [facility];  Service: Gynecology Oncology    CYSTOSCOPY      NC LAPS TOTAL HYSTERECT 250  GM/< W/RMVL TUBE/OVARY N/A 6/20/2017    Procedure: ROBOTIC HYSTERECTOMY; BILATERAL SALPINO-OOPHERECTOMY; umbilical hernia repair.;  Surgeon: Tacho Arnold MD;  [facility];  Service: Gynecology Oncology    AZ REPAIR FIRST ABDOMINAL WALL HERNIA N/A 5/12/2022    Procedure: REPAIR HERNIA INCISIONAL;  Surgeon: Horacio Scherer DO;  Location[facility];  Service: General    TONSILECTOMY AND ADNOIDECTOMY      TONSILLECTOMY      UMBILICAL HERNIA REPAIR

## 2025-06-09 NOTE — ASSESSMENT & PLAN NOTE
Brought in by family due to concerns of waxing and waning mentation and agitation particularly later in the day suggestive of sundowning, with difficulty caring for patient in the home setting  She was seen by neuropsychiatry on recent admission at [place] --deemed to have capacity  Patient was evaluated by geriatrics and is on Remeron and Seroquel but as per daughter, patient does not take her medications regularly at home.  Geriatrics is recommending addition of gabapentin plus increased Remeron  Seems to have significant component of anxiety  Per PT and OT consultations-- level 3 resource intensity.  Case management provided patient's daughter information about additional resources.  Likely would be a good candidate for assisted living but financial concerns at play

## 2025-06-09 NOTE — CASE MANAGEMENT
Case Management Assessment & Discharge Planning Note    Patient name Yajaira Marsh  Location W /W -01 MRN [id number]  : 1945 Date 2025       Current Admission Date: 2025  Current Admission Diagnosis:Cognitive and behavioral changes   Patient Active Problem List    Diagnosis Date Noted    Recent diagnosis pulmonary embolus and DVT (HCC) 2025    Abnormal EKG 2025    Generalized anxiety disorder 05/15/2025    Rhinorrhea 2025    Dry mouth 2025    Cognitive and behavioral changes 2025    Hypokalemia 2025    Hypoglycemia 2025    Ambulatory dysfunction 2025    Essential hypertension 2025    Depressive disorder 2025    Current mild episode of major depressive disorder without prior episode (HCC) 2024    Diarrhea 10/17/2024    Encounter for screening involving social determinants of health (SDoH) 2024    Obesity (BMI 30-39.9) 10/13/2023    Kidney stone 2023    Atherosclerosis of both carotid arteries 07/10/2023    Stage 3b chronic kidney disease (HCC) 07/10/2023    Kidney mass 2023    Fall 2023    Anxiety 2022    Osteoarthritis of spine with radiculopathy, lumbar region 2022    Lumbar radiculopathy 2022    Microalbuminuria 2021    S/P CABG (coronary artery bypass graft) 2021    Apraxia 2021    Herpes zoster without complication 2021    MADAN (generalized anxiety disorder) 2020    SARAY (obstructive sleep apnea) 2018    Allergic rhinitis 2018    Onychomycosis 10/27/2017    Lung nodule 2017    Severe episode of recurrent major depressive disorder, without psychotic features (HCC) 2017    History of DVT (deep vein thrombosis) 2017    CAD s/p CABG 2017    Hyperlipidemia 2017    Hypothyroidism 2017    Spinal stenosis of lumbar region 2017    Vitamin B12 deficiency 2016    Diabetic polyneuropathy  associated with type 2 diabetes mellitus (HCC) 10/07/2015    Vitreo-retinal adhesions 04/21/2015    Adenomatous polyp of colon 11/30/2013    Psoriasis with arthropathy (HCC) 11/05/2013    Chronic mastoiditis 06/06/2013    Vitamin D deficiency 08/31/2012    Type 2 diabetes mellitus with hyperglycemia, with long-term current use of insulin (HCC) 12/30/2010      LOS (days): 2  Geometric Mean LOS (GMLOS) (days):   Days to GMLOS:     OBJECTIVE:  PATIENT READMITTED TO HOSPITAL  Risk of Unplanned Readmission Score: 41.6         Current admission status: Inpatient       Preferred Pharmacy:   [facility]/pharmacy #[id number] - [place], [place] - [address]  [facility] [address]  Phone: [phone] Fax: [phone]    Primary Care Provider: Sukumar Clemens DO    Primary Insurance: BL[employer] CR[employer] REP  Secondary Insurance:     ASSESSMENT:  Active Health Care Proxies       jocelyne murphy Alternate Health Care Agent - Daughter   Primary Phone: [phone] (Mobile)                           Readmission Root Cause  30 Day Readmission: Yes  During your hospital stay, did someone (provider, nurse, ) explain your care to you in a way you could understand?: Yes  Did you feel medically stable to leave the hospital?: Yes  Were you able to pay for your medication at the pharmacy?: Yes  Did you have reliable transportation to take you to your appointments?: Yes  During previous admission, was a post-acute recommendation made?: No  Patient was readmitted due to: Behavior concerns  Action Plan: Geriatrics consult    Patient Information  Admitted from:: Home  Mental Status: Alert  During Assessment patient was accompanied by: Not accompanied during assessment  Assessment information provided by:: Patient  Primary Caregiver: Self  Support Systems: Spouse/significant other, Children  Home entry access options. Select all that apply.: No steps to enter home  Type of Current Residence: [place]  Living Arrangements: Lives  w/ Spouse/significant other  Is patient a ?: No    Activities of Daily Living Prior to Admission  Functional Status: Independent  Completes ADLs independently?: Yes  Ambulates independently?: Yes  Does patient use assisted devices?: Yes  Assisted Devices (DME) used: Walker  Does patient currently own DME?: Yes  What DME does the patient currently own?: Walker  Does patient have a history of Outpatient Therapy (PT/OT)?: Yes  Does the patient have a history of Short-Term Rehab?: Yes  Does patient have a history of HHC?: No  Does patient currently have HHC?: No         Patient Information Continued  Income Source: Pension/senior care  Does patient have prescription coverage?: Yes  Can the patient afford their medications and any related supplies (such as glucometers or test strips)?: Yes  Does patient receive dialysis treatments?: No         Means of Transportation  Means of Transport to Appts:: Family transport          DISCHARGE DETAILS:    Discharge planning discussed with:: Patient  Freedom of Choice: Yes  Comments - Freedom of Choice: CM met with patient at bedside. CM introduced self and CM role. Patient lives with , is independent, uses a walker occassionally, no O2, no HHC services at this time. Therapy evaluations pending. CM will follow  CM contacted family/caregiver?: Yes  Were Treatment Team discharge recommendations reviewed with patient/caregiver?: Yes  Did patient/caregiver verbalize understanding of patient care needs?: Yes  Were patient/caregiver advised of the risks associated with not following Treatment Team discharge recommendations?: Yes    Contacts  Patient Contacts: daughter- Kathryn  Relationship to Patient:: Family  Contact Method: Phone  Phone Number: [phone]  Reason/Outcome: Discharge Planning              Other Referral/Resources/Interventions Provided:  Referral Comments: Therapy evaluations pending. CM will follow

## 2025-06-09 NOTE — ASSESSMENT & PLAN NOTE
Saturates well in room air  Continue Eliquis for pulmonary embolism  Encourage out of bed as tolerated and incentive spirometry

## 2025-06-09 NOTE — PLAN OF CARE
Problem: OCCUPATIONAL THERAPY ADULT  Goal: Performs self-care activities at highest level of function for planned discharge setting.  See evaluation for individualized goals.  Description: Treatment Interventions: ADL retraining, Functional transfer training, Endurance training, Cognitive reorientation, Patient/family training, Equipment evaluation/education, Compensatory technique education, Activityengagement, Continued evaluation (ACLS)          See flowsheet documentation for full assessment, interventions and recommendations.   Note: Limitation: Decreased ADL status, Decreased Safe judgement during ADL, Decreased cognition, Decreased endurance, Decreased self-care trans, Decreased high-level ADLs (insight, problem solving, safety awareness, attention, memory)  Prognosis: Good  Assessment: Patient is a 79 y.o. female seen for OT evaluation and cognitive evaluation  at [facility] following admission on 6/7/2025  s/p Cognitive and behavioral changes. Please see above for comprehensive list of comorbidities and significant PMHx impacting functional performance.  Upon initial evaluation, pt appears to be performing below baseline functional status.   Occupational performance is affected by the following deficits: endurance , decreased balance , decreased activity tolerance , impaired memory , attention to task, impaired judgement and problem solving , decreased emotional regulation and coping skills , impaired safety awareness , and impaired learning ability d/t current cognitive status . Personal/Environmental factors impacting D/C include: decreased insight toward deficits  and baseline cognitive deficits. Supporting factors include: support system available Patient would benefit from OT services within the acute care setting to maximize level of functional independence in the following areas self-care transfers, functional mobility, and ADLs.  From OT standpoint, recommendation at time of D/C  would be Level 3: minimum resource intensity  WITH 24/7 supervision per ACLS recommendations.     Rehab Resource Intensity Level, OT: III (Minimum Resource Intensity) (c 24/7 supervision per ACLS scoring recommendations)     Taylor Whatley, OT

## 2025-06-09 NOTE — CASE MANAGEMENT
Case Management Discharge Planning Note    Patient name Yajaira Marsh  Location W /W -01 MRN [id number]  : 1945 Date 2025       Current Admission Date: 2025  Current Admission Diagnosis:Cognitive and behavioral changes   Patient Active Problem List    Diagnosis Date Noted    Recent diagnosis pulmonary embolus and DVT (HCC) 2025    Abnormal EKG 2025    Generalized anxiety disorder 05/15/2025    Rhinorrhea 2025    Dry mouth 2025    Cognitive and behavioral changes 2025    Hypokalemia 2025    Hypoglycemia 2025    Ambulatory dysfunction 2025    Essential hypertension 2025    Depressive disorder 2025    Current mild episode of major depressive disorder without prior episode (HCC) 2024    Diarrhea 10/17/2024    Encounter for screening involving social determinants of health (SDoH) 2024    Obesity (BMI 30-39.9) 10/13/2023    Kidney stone 2023    Atherosclerosis of both carotid arteries 07/10/2023    Stage 3b chronic kidney disease (HCC) 07/10/2023    Kidney mass 2023    Fall 2023    Anxiety 2022    Osteoarthritis of spine with radiculopathy, lumbar region 2022    Lumbar radiculopathy 2022    Microalbuminuria 2021    S/P CABG (coronary artery bypass graft) 2021    Apraxia 2021    Herpes zoster without complication 2021    MADAN (generalized anxiety disorder) 2020    SARAY (obstructive sleep apnea) 2018    Allergic rhinitis 2018    Onychomycosis 10/27/2017    Lung nodule 2017    Severe episode of recurrent major depressive disorder, without psychotic features (HCC) 2017    History of DVT (deep vein thrombosis) 2017    CAD s/p CABG 2017    Hyperlipidemia 2017    Hypothyroidism 2017    Spinal stenosis of lumbar region 2017    Vitamin B12 deficiency 2016    Diabetic polyneuropathy associated with type  2 diabetes mellitus (HCC) 10/07/2015    Vitreo-retinal adhesions 04/21/2015    Adenomatous polyp of colon 11/30/2013    Psoriasis with arthropathy (HCC) 11/05/2013    Chronic mastoiditis 06/06/2013    Vitamin D deficiency 08/31/2012    Type 2 diabetes mellitus with hyperglycemia, with long-term current use of insulin (HCC) 12/30/2010      LOS (days): 2  Geometric Mean LOS (GMLOS) (days):   Days to GMLOS:     OBJECTIVE:  Risk of Unplanned Readmission Score: 41.6         Current admission status: Inpatient   Preferred Pharmacy:   [facility]/pharmacy #[id number] - [place], [place] - [address]  [address] [facility] [address]  Phone: [phone] Fax: [phone]    Primary Care Provider: Sukumar Clemens DO    Primary Insurance: [employer] REP  Secondary Insurance:     DISCHARGE DETAILS:    Discharge planning discussed with:: raven Peralta  Freedom of Choice: Yes  Comments - Freedom of Choice: CM spoke with daughter Kathryn on phone. CM emailed daughter list of private duty A agencies and information on [employer] to email provided [email or web address]. CM also let daughter know that attending consulted Geriatrics  CM contacted family/caregiver?: Yes  Were Treatment Team discharge recommendations reviewed with patient/caregiver?: Yes  Did patient/caregiver verbalize understanding of patient care needs?: Yes  Were patient/caregiver advised of the risks associated with not following Treatment Team discharge recommendations?: Yes    Contacts  Patient Contacts: daughter- Kathryn  Relationship to Patient:: Family  Contact Method: Phone  Phone Number: [phone]  Reason/Outcome: Discharge Planning              Other Referral/Resources/Interventions Provided:  Interventions: HHA  Referral Comments: List of private duty agencies sent to daughter

## 2025-06-09 NOTE — PLAN OF CARE
Problem: Prexisting or High Potential for Compromised Skin Integrity  Goal: Skin integrity is maintained or improved  Description: INTERVENTIONS:  - Identify patients at risk for skin breakdown  - Assess and monitor skin integrity including under and around medical devices   - Assess and monitor nutrition and hydration status  - Monitor labs  - Assess for incontinence   - Turn and reposition patient  - Assist with mobility/ambulation  - Relieve pressure over crista prominences   - Avoid friction and shearing  - Provide appropriate hygiene as needed including keeping skin clean and dry  - Evaluate need for skin moisturizer/barrier cream  - Collaborate with interdisciplinary team  - Patient/family teaching  - Consider wound care consult    Assess:  - Review Dante scale daily  - Clean and moisturize skin every   - Inspect skin when repositioning, toileting, and assisting with ADLS  - Assess under medical devices such as  every   - Assess extremities for adequate circulation and sensation     Bed Management:  - Have minimal linens on bed & keep smooth, unwrinkled  - Change linens as needed when moist or perspiring  - Avoid sitting or lying in one position for more than  hours while in bed?Keep HOB at degrees   - Toileting:  - Offer bedside commode  - Assess for incontinence every   - Use incontinent care products after each incontinent episode such as     Activity:  - Mobilize patient times a day  - Encourage activity and walks on unit  - Encourage or provide ROM exercises   - Turn and reposition patient every  Hours  - Use appropriate equipment to lift or move patient in bed  - Instruct/ Assist with weight shifting every  when out of bed in chair  - Consider limitation of chair time  hour intervals    Skin Care:  - Avoid use of baby powder, tape, friction and shearing, hot water or constrictive clothing  - Relieve pressure over bony prominences using   - Do not massage red bony areas    Next Steps:  - Teach patient  strategies to minimize risks such as  - Consider consults to  interdisciplinary teams such as  Outcome: Progressing     Problem: PAIN - ADULT  Goal: Verbalizes/displays adequate comfort level or baseline comfort level  Description: Interventions:  - Encourage patient to monitor pain and request assistance  - Assess pain using appropriate pain scale  - Administer analgesics as ordered based on type and severity of pain and evaluate response  - Implement non-pharmacological measures as appropriate and evaluate response  - Consider cultural and social influences on pain and pain management  - Notify physician/advanced practitioner if interventions unsuccessful or patient reports new pain  - Educate patient/family on pain management process including their role and importance of  reporting pain   - Provide non-pharmacologic/complimentary pain relief interventions  Outcome: Progressing     Problem: INFECTION - ADULT  Goal: Absence or prevention of progression during hospitalization  Description: INTERVENTIONS:  - Assess and monitor for signs and symptoms of infection  - Monitor lab/diagnostic results  - Monitor all insertion sites, i.e. indwelling lines, tubes, and drains  - Monitor endotracheal if appropriate and nasal secretions for changes in amount and color  - [place] appropriate cooling/warming therapies per order  - Administer medications as ordered  - Instruct and encourage patient and family to use good hand hygiene technique  - Identify and instruct in appropriate isolation precautions for identified infection/condition  Outcome: Progressing  Goal: Absence of fever/infection during neutropenic period  Description: INTERVENTIONS:  - Monitor WBC  - Perform strict hand hygiene  - Limit to healthy visitors only  - No plants, dried, fresh or silk flowers with tirado in patient room  Outcome: Progressing     Problem: SAFETY ADULT  Goal: Patient will remain free of falls  Description: INTERVENTIONS:  - Educate  patient/family on patient safety including physical limitations  - Instruct patient to call for assistance with activity   - Consider consulting OT/PT to assist with strengthening/mobility based on AM PAC & JH-HLM score  - Consult OT/PT to assist with strengthening/mobility   - Keep Call bell within reach  - Keep bed low and locked with side rails adjusted as appropriate  - Keep care items and personal belongings within reach  - Initiate and maintain comfort rounds  - Make Fall Risk Sign visible to staff  - Offer Toileting every  Hours, in advance of need  - Initiate/Maintain alarm  - Obtain necessary fall risk management equipment:   - Apply yellow socks and bracelet for high fall risk patients  - Consider moving patient to room near nurses station  Outcome: Progressing  Goal: Maintain or return to baseline ADL function  Description: INTERVENTIONS:  -  Assess patient's ability to carry out ADLs; assess patient's baseline for ADL function and identify physical deficits which impact ability to perform ADLs (bathing, care of mouth/teeth, toileting, grooming, dressing, etc.)  - Assess/evaluate cause of self-care deficits   - Assess range of motion  - Assess patient's mobility; develop plan if impaired  - Assess patient's need for assistive devices and provide as appropriate  - Encourage maximum independence but intervene and supervise when necessary  - Involve family in performance of ADLs  - Assess for home care needs following discharge   - Consider OT consult to assist with ADL evaluation and planning for discharge  - Provide patient education as appropriate  - Monitor functional capacity and physical performance, use of AM PAC & JH-HLM   - Monitor gait, balance and fatigue with ambulation    Outcome: Progressing  Goal: Maintains/Returns to pre admission functional level  Description: INTERVENTIONS:  - Perform AM-PAC 6 Click Basic Mobility/ Daily Activity assessment daily.  - Set and communicate daily mobility goal  to care team and patient/family/caregiver.   - Collaborate with rehabilitation services on mobility goals if consulted  - Perform Range of Motion  times a day.  - Reposition patient every  hours.  - Dangle patient  times a day  - Stand patient  times a day  - Ambulate patient  times a day  - Out of bed to chair  times a day   - Out of bed for meals  times a day  - Out of bed for toileting  - Record patient progress and toleration of activity level   Outcome: Progressing     Problem: DISCHARGE PLANNING  Goal: Discharge to home or other facility with appropriate resources  Description: INTERVENTIONS:  - Identify barriers to discharge w/patient and caregiver  - Arrange for needed discharge resources and transportation as appropriate  - Identify discharge learning needs (meds, wound care, etc.)  - Arrange for interpretive services to assist at discharge as needed  - Refer to Case Management Department for coordinating discharge planning if the patient needs post-hospital services based on physician/advanced practitioner order or complex needs related to functional status, cognitive ability, or social support system  Outcome: Progressing     Problem: Knowledge Deficit  Goal: Patient/family/caregiver demonstrates understanding of disease process, treatment plan, medications, and discharge instructions  Description: Complete learning assessment and assess knowledge base.  Interventions:  - Provide teaching at level of understanding  - Provide teaching via preferred learning methods  Outcome: Progressing

## 2025-06-09 NOTE — ASSESSMENT & PLAN NOTE
Lab Results   Component Value Date    HGBA1C 9.3 (H) 06/08/2025     Recent Labs     06/08/25  1748 06/08/25  2201 06/09/25  0716 06/09/25  1042   POCGLU 160* 166* 130 199*   Blood Sugar Average: Last 72 hrs:  (P) 161.875  A1c reflects poor control but improving  Continue home dose Lantus plus SSI

## 2025-06-09 NOTE — CONSULTS
Consultation - Geriatric Medicine   Yajaira Marsh 79 y.o. female MRN: [id number]  Unit/Bed#: W -01 Encounter: [id number]      Assessment & Plan     Moderate protein-calorie malnutrition (HCC)  Assessment & Plan  Patient reported that she lost about 40 pounds in the last 5 months  Reports decreased appetite and she is afraid to eat as she feels that the food gets stuck in her throat.  Dietitian to follow  Follow-up with PCP and consider screening for malignancy  Patient states that she has follow-up appointment with GI as outpatient      acute/subacute pulmonary embolus and DVT (HCC)  Assessment & Plan  Saturates well in room air  Continue Eliquis for pulmonary embolism  Encourage out of bed as tolerated and incentive spirometry    Stage 3b chronic kidney disease (HCC)  Assessment & Plan  Lab Results   Component Value Date    EGFR 42 06/08/2025    EGFR 38 06/07/2025    EGFR 39 06/01/2025    CREATININE 1.21 06/08/2025    CREATININE 1.32 (H) 06/07/2025    CREATININE 1.30 06/01/2025     Creatinine stable.  Will avoid nephrotoxic medication.  Encourage po hydration.  Will monitor BMP.     * Cognitive and behavioral changes  Assessment & Plan  Patient noted to be anxious  Mini cog 4/5.  CT head shows microangiopathic changes  Recently patient seems to need assistance with IADLs  Will continue to provide supportive care, reorient as needed.  Patient is at high risk for delirium, will monitor closely and place on delirium precautions.  Maintain sleep/wake cycle.  Optimize pain regimen.  Monitor for constipation and urinary retention and manage as needed.  Check B12 level and TSH  Encourage family to visit.  Encourage to wear glasses and hearing aids while awake.  Encourage po intake, assist with feeding if needed.   Increase mirtazapine to 30 mg nightly.  Continue Seroquel 25 mg nightly and continue to monitor Qtc  Start gabapentin 100 mg at 1 PM and 8 PM for anxiety  Avoid use of hydroxyzine in the  future  Follow-up with geriatrics as outpatient.  I would recommend supervision with medication at discharge.   For today drive test if patient is considering driving in the future              History of Present Illness   Physician Requesting Consult: Jeannie Jones MD  Reason for Consult / Principal Problem: AMS        HPI: Yajaira Marsh is a 79 y.o. year old female who presented to the hospital with cognitive and behavioral changes at home.  She was recently admitted to the hospital due to DVT and pulmonary embolism and started on anticoagulation.  At the time of encounter patient seems slightly anxious she was alert oriented x 3 and she was able to answer my questions.  She denies chest pain shortness of breath cough.  Reports intermittent lightheadedness.  Difficulty sleeping at times.  Reports bilateral lower extremity edema and reports intermittent abdominal pain and abdominal distention.  States she is afraid to eat and she has decreased appetite.  Reports 40 pounds weight loss in the past 5 months.  Denies diarrhea constipation.  No nausea or vomiting.  Denies dysuria or hematuria.  No fever or chills.  Patient lives at home with her  and states that she is managing her medications on her own.  Reports difficulty managing finances and states that she does not drive for a while now.    Inpatient consult to Gerontology  Consult performed by: Judy Sumner MD  Consult ordered by: Indiana Sanderson MD          Review of Systems   Constitutional:  Positive for appetite change and unexpected weight change. Negative for chills and fever.   HENT:  Positive for trouble swallowing. Negative for congestion and rhinorrhea.    Eyes:  Positive for visual disturbance.   Respiratory:  Negative for cough, shortness of breath and wheezing.    Cardiovascular:  Positive for leg swelling. Negative for chest pain and palpitations.   Gastrointestinal:  Positive for abdominal distention. Negative for  abdominal pain and constipation.   Endocrine: Negative for cold intolerance.   Genitourinary:  Negative for difficulty urinating, dysuria and hematuria.   Musculoskeletal:  Positive for gait problem.   Skin:  Negative for wound.   Allergic/Immunologic: Negative for environmental allergies.   Neurological:  Positive for light-headedness. Negative for dizziness and seizures.   Hematological:  Does not bruise/bleed easily.   Psychiatric/Behavioral:  Positive for sleep disturbance. Negative for behavioral problems. The patient is nervous/anxious.            Historical Information   Past Medical History[1]  Past Surgical History[2]  Social History   Social History     Substance and Sexual Activity   Alcohol Use Never     Social History     Substance and Sexual Activity   Drug Use No     Tobacco Use History[3]  Family History: Family History[4]    Meds/Allergies   all current active meds have been reviewed and current meds:   Current Facility-Administered Medications:     apixaban (ELIQUIS) tablet 5 mg, BID    aspirin (ECOTRIN LOW STRENGTH) EC tablet 81 mg, Daily    carvedilol (COREG) tablet 6.25 mg, BID With Meals    cyanocobalamin (VITAMIN B-12) tablet 1,000 mcg, Daily    diltiazem (CARDIZEM CD) 24 hr capsule 120 mg, Daily    ergocalciferol (VITAMIN D2) capsule 50,000 Units, Weekly    fluticasone (FLONASE) 50 mcg/act nasal spray 1 spray, Daily    gabapentin (NEURONTIN) capsule 100 mg, BID    hydrALAZINE (APRESOLINE) injection 5 mg, Q6H PRN    [Held by provider] hydrOXYzine HCL (ATARAX) tablet 25 mg, Q6H PRN    insulin glargine (LANTUS) subcutaneous injection 7 Units 0.07 mL, HS    insulin lispro (HumALOG/ADMELOG) 100 units/mL subcutaneous injection 1-5 Units, HS    insulin lispro (HumALOG/ADMELOG) 100 units/mL subcutaneous injection 1-6 Units, TID AC **AND** Fingerstick Glucose (POCT), TID AC    isosorbide mononitrate (IMDUR) 24 hr tablet 30 mg, Daily    levothyroxine tablet 75 mcg, Daily    melatonin tablet 3 mg, HS     mirtazapine (REMERON) tablet 30 mg, HS    QUEtiapine (SEROquel) tablet 25 mg, HS    sodium chloride (OCEAN) 0.65 % nasal spray 1 spray, Q1H PRN    Allergies[5]    Objective     Intake/Output Summary (Last 24 hours) at 6/9/2025 1439  Last data filed at 6/9/2025 1241  Gross per 24 hour   Intake 480 ml   Output 1 ml   Net 479 ml     Invasive Devices       Peripheral Intravenous Line  Duration             Peripheral IV 06/07/25 Right Antecubital 2 days                    Physical Exam  Vitals and nursing note reviewed.   Constitutional:       General: She is not in acute distress.     Appearance: She is well-developed.   HENT:      Head: Normocephalic and atraumatic.      Ears:      Comments: [place]    Eyes:      Conjunctiva/sclera: Conjunctivae normal.       Cardiovascular:      Rate and Rhythm: Normal rate and regular rhythm.      Heart sounds: No murmur heard.  Pulmonary:      Effort: Pulmonary effort is normal. No respiratory distress.      Breath sounds: Normal breath sounds.   Abdominal:      Palpations: Abdomen is soft.      Tenderness: There is no abdominal tenderness.     Musculoskeletal:         General: No swelling.      Cervical back: Neck supple.      Right lower leg: Edema present.      Left lower leg: Edema present.     Skin:     General: Skin is warm and dry.      Capillary Refill: Capillary refill takes less than 2 seconds.     Neurological:      Mental Status: She is alert.     Psychiatric:      Comments: anxious         Lab Results:   I have personally reviewed pertinent lab results including the following:  - CBC CMP    I have personally reviewed the following imaging study reports in PACS:  - CT head      Therapies:   PT: level III    VTE Prophylaxis: VTE covered by:  apixaban, Oral, 5 mg at 06/09/25 0843        Code Status: Level 1 - Full Code  Advance Directive and Living Will:      Power of :    POLST:      Family and Social Support:   Living Arrangements: Lives w/ Spouse/significant  other  Support Systems: Spouse/significant other; Children  Assistance Needed: n/a  Type of Current Residence: [facility]  Current Home Care Services: No  Discharge planning discussed with:: raven Peralta  Freedom of Choice: Yes      Thank you for allowing me to participate in your patients' care. Please do not hesitate to call with any additional questions.  Judy Sumner MD          [1]   Past Medical History:  Diagnosis Date    Acute embolism and thrombosis of unspecified deep veins of unspecified lower extremity (HCC)     Last Assessed:  5/18/17    Anemia     Anosmia     Anxiety     Arthritis     Asthma     Back pain     Bilateral macular retinal edema     CAD (coronary artery disease)     Cataract     Cervical disc herniation     Cervical radiculopathy     Cervical spinal stenosis     Cervical spondylolysis     Chronic kidney disease     Chronic mastoiditis     Colon polyp     Complex endometrial hyperplasia     Depression     Diabetes mellitus (HCC)     Disease of thyroid gland     DVT (deep venous thrombosis) (HCC)     DVT (deep venous thrombosis) (Formerly [facility]) 06/16/2017    Fibromyalgia     Fibromyalgia, primary     Hyperlipidemia     Hypertension     Hypothyroid     Kidney stone     Lumbar radiculopathy     Neck pain     Obese     PONV (postoperative nausea and vomiting)     Shingles 07/01/2021    Spinal stenosis     Stomach ulcer     Thyroid disease     Toxic metabolic encephalopathy 02/11/2025    Vascular claudication (Formerly [facility]) 12/11/2017   [2]   Past Surgical History:  Procedure Laterality Date    BACK SURGERY      CARPAL TUNNEL RELEASE      CATARACT EXTRACTION      CHOLECYSTECTOMY      COLONOSCOPY      CORONARY ANGIOPLASTY      CORONARY ARTERY BYPASS GRAFT      CYSTOSCOPY N/A 6/20/2017    Procedure: CYSTOSCOPY;  Surgeon: Tacho Arnold MD;  Location: [facility];  Service: Gynecology Oncology    CYSTOSCOPY      NM LAPS TOTAL HYSTERECT 250 GM/< W/RMVL TUBE/OVARY N/A 6/20/2017    Procedure: ROBOTIC HYSTERECTOMY;  BILATERAL SALPINO-OOPHERECTOMY; umbilical hernia repair.;  Surgeon: Tacho Arnold MD;  Location: [facility];  Service: Gynecology Oncology    MO REPAIR FIRST ABDOMINAL WALL HERNIA N/A 2022    Procedure: REPAIR HERNIA INCISIONAL;  Surgeon: Horacio Scherer DO;  Location: [facility];  Service: General    TONSILECTOMY AND ADNOIDECTOMY      TONSILLECTOMY      UMBILICAL HERNIA REPAIR     [3]   Social History  Tobacco Use   Smoking Status Former    Current packs/day: 0.00    Average packs/day: 1 pack/day for 6.0 years (6.0 ttl pk-yrs)    Types: Cigarettes    Start date:     Quit date:     Years since quittin.4    Passive exposure: Never   Smokeless Tobacco Never   Tobacco Comments    Smoked about a half a pack for about 4 yrs.  Quite about 50 yrs ago.   [4]   Family History  Problem Relation Name Age of Onset    Arthritis Mother      Leukemia Mother      Other Mother          Anxiety, major depressive disorder, recurrent episode with atypical features    Coronary artery disease Father          Heart problem    Diabetes Father      Other Father          Infectious disease    Alzheimer's disease Maternal Grandmother      Other Maternal Grandfather          Heart problem    Other Daughter          Anxiety, major depressive disorder, recurrent episode with atypical features    Alcohol abuse Other          Grandparent    Cancer Family      Diabetes Family      Hypertension Family      Other Family          Reported prior back trouble, thyroid disorder   [5]   Allergies  Allergen Reactions    Bee Pollen Other (See Comments)    Cat Dander Other (See Comments)    Dog Epithelium (Canis Lupus Familiaris) Other (See Comments)    Molds & Smuts Allergic Rhinitis    Other Allergic Rhinitis     RAGWEED, CAT DANDER, DOG DANDER     Short Ragweed Pollen Ext Other (See Comments)    Pollen Extract Other (See Comments)     Cold symptoms

## 2025-06-09 NOTE — UTILIZATION REVIEW
Initial Clinical Review    Admission: Date/Time/Statement:   Admission Orders (From admission, onward)       Ordered        06/07/25 1602  INPATIENT ADMISSION  Once                          Orders Placed This Encounter   Procedures    INPATIENT ADMISSION     Standing Status:   Standing     Number of Occurrences:   1     Level of Care:   Med Surg [16]     Estimated length of stay:   More than 2 Midnights     Certification:   I certify that inpatient services are medically necessary for this patient for a duration of greater than two midnights. See H&P and MD Progress Notes for additional information about the patient's course of treatment.     ED Arrival Information       Expected   -    Arrival   6/7/2025 11:31    Acuity   Urgent              Means of arrival   Ambulance    Escorted by   [facility]    Service   Hospitalist    Admission type   Emergency              Arrival complaint   *EMS             Chief Complaint   Patient presents with    Shortness of Breath     Pt presentse to the ED from home via EMS. Dx PE 2 weeks ago. Pt reports waking up today and reporting SOB. Pt reporting extreme anxiety on scene with ems, took one dose of atarax 25mg en route. Pt reports improvement in symptoms.        Initial Presentation: 79 y.o. female with hx recent diagnosis of pulmonary embolus and DVT on Eliquis(PT OT recommended rehab at that point but patient opted to go home, d/c'ed to home 6/2/25) , cognitive impairment, CAD status post CABG , DM2, HTN . CKD 3bwho presents to ED via EMS with cognitive and behavioral changes at home . During last admission , pt  had significant delirium finding restraints, medication and geriatric evaluation during that hospitalization as well. She was evaluated by neuropsychiatry and deemed to have capacity to make medical decisions. At home, per patient's daughter, patient has been intermittently confused with inability to sleep at night. She has been intermittently verbally  "aggressive towards her  and occasionally physically aggressive. She sometimes confuses her medications and misses medication doses per daughter.  Increased concern for the family with regards to her mental status and decision-making. Patient did require dose of Ativan in the ED to help with agitation with good improvement. She does appear to be having active paranoia and sundowning along with hallucinations. On exam, pt awake, alert, oriented x 3 . communicative. BAILEY.Sat well on RA .  Pt admitted as Inpatient with cognitive and behavioral changes. PLan- Obtain CT head . Continue home dose Remeron, Seroquel and melatonin. Depirium monitoring. Bi2, Vit C , TSH levels. PT/OT . Case management - dgt  and family is requesting 24-hour care for patient.   Anticipated Length of Stay/Certification Statement: Patient will be admitted on an inpatient basis with an anticipated length of stay of greater than 2 midnights secondary to PT OT evaluation, safe discharge planning.     Date: 6/8   Day 2:     Pt  knows where she is but not why she is here. She does not want to eat anything on her breakfast tray. She keeps asking where her family is and states she wants to go home. Pt anxious , can be difficult to redirect . Receiving prn atarax for anxiety .  Right >left LE swelling. No calf tenderness. No erythema . CT head w/o acute findings .  BP elevated , receiving prn Hydralazine . When Patient becomes agitated or confused, she cannot leave AMA without discussion with her daughter or . Case management  for placement .       Date: 6/9   Day 3: Has surpassed a 2nd midnight with active treatments and services.    Evaluated patient in the morning at which point she said \"I am so confused,\" \"my memory is off.\" Patient told me she is afraid to drink because she is \"afraid to choke.\"  She does admit to having anxiety.  And is mildly anxious appearing .  Awake alert interactive. No facial asymmetry. No tremor. Patient is " appropriately oriented x 3. She is able to tell me the current president. She is able to spell world backwards .  Her daughter reported that she is always good in the morning but gets worse as the day goes on and they are very concerned with how to manage her later in the day.   PT/OT- level 3 rehab resource intensity . Pet OT , w/ 24/7 supervision per ACLS scoring recommendations.   [facility] provided dgt with a  list of private duty agencies sent to daughter  . Likely would be a good candidate for assisted living but financial concerns at play   Gerontology consult - Moderate protein/ calorie malnutrition : Patient reported that she lost about 40 pounds in the last 5 months. Reports decreased appetite and she is afraid to eat as she feels that the food gets stuck in her throat.Patient states that she has follow-up appointment with GI as outpatient . Nutrition consult .Cognitive and behavioral changes : Pt noted to be anxious . 4/5 Mini cog . Pt is high risk for delirium, will monitor closely and place on delirium precautions. Increase mirtazapine to 30 mg nightly.  Continue Seroquel 25 mg nightly and continue to monitor Qtc Start gabapentin 100 mg at 1 PM and 8 PM for anxiety  . Avoid use of hydroxyzine in the future . F/U Geriatrics as outpt .   Recommend supervision with medication at discharge.   Nutrition : Protein calorie malnutrition r/t inadequate intake as evidenced by <75% energy intake compared to estimated energy needs > 1 month, 12% unintentional weight loss over 4 months, and muscle wasting to temples; currently treated with oral diet. Pt declined oral nutrition supplements Body mass index is 30.83 kg/m².         ED Treatment-Medication Administration from 06/07/2025 1131 to 06/08/2025 2152         Date/Time Order Dose Route Action     06/07/2025 1358 LORazepam (ATIVAN) injection 0.5 mg 0.5 mg Intravenous Given     06/08/2025 9292 aspirin (ECOTRIN LOW STRENGTH) EC tablet 81 mg 81 mg Oral Given      06/08/2025 0848 carvedilol (COREG) tablet 6.25 mg 6.25 mg Oral Given     06/08/2025 1547 carvedilol (COREG) tablet 6.25 mg 6.25 mg Oral Given     06/08/2025 0846 cyanocobalamin (VITAMIN B-12) tablet 1,000 mcg 1,000 mcg Oral Given     06/08/2025 0846 diltiazem (CARDIZEM CD) 24 hr capsule 120 mg 120 mg Oral Given     06/08/2025 0847 fluticasone (FLONASE) 50 mcg/act nasal spray 1 spray 1 spray Nasal Given     06/07/2025 2159 insulin glargine (LANTUS) subcutaneous injection 7 Units 0.07 mL 7 Units Subcutaneous Given     06/08/2025 0845 isosorbide mononitrate (IMDUR) 24 hr tablet 30 mg 30 mg Oral Given     06/08/2025 0845 levothyroxine tablet 75 mcg 75 mcg Oral Given     06/07/2025 2237 melatonin tablet 3 mg 3 mg Oral Given     06/08/2025 2124 melatonin tablet 3 mg 3 mg Oral Given     06/08/2025 0030 mirtazapine (REMERON) tablet 15 mg 15 mg Oral Given     06/07/2025 2237 QUEtiapine (SEROquel) tablet 25 mg 25 mg Oral Given     06/08/2025 2125 QUEtiapine (SEROquel) tablet 25 mg 25 mg Oral Given     06/07/2025 2237 hydrOXYzine HCL (ATARAX) tablet 25 mg 25 mg Oral Given     06/08/2025 1845 hydrOXYzine HCL (ATARAX) tablet 25 mg 25 mg Oral Given     06/08/2025 1321 insulin lispro (HumALOG/ADMELOG) 100 units/mL subcutaneous injection 1-6 Units 1 Units Subcutaneous Given     06/08/2025 1750 insulin lispro (HumALOG/ADMELOG) 100 units/mL subcutaneous injection 1-6 Units 1 Units Subcutaneous Given     06/07/2025 2158 insulin lispro (HumALOG/ADMELOG) 100 units/mL subcutaneous injection 1-5 Units 1 Units Subcutaneous Given     06/08/2025 0847 apixaban (ELIQUIS) tablet 5 mg 5 mg Oral Given     06/08/2025 1845 apixaban (ELIQUIS) tablet 5 mg 5 mg Oral Given     06/07/2025 1817 apixaban (ELIQUIS) tablet 10 mg 10 mg Oral Given     06/07/2025 1841 hydrALAZINE (APRESOLINE) injection 5 mg 5 mg Intravenous Given     06/08/2025 2023 hydrALAZINE (APRESOLINE) injection 5 mg 5 mg Intravenous Given     06/08/2025 1546 ergocalciferol (VITAMIN D2)  capsule 50,000 Units 50,000 Units Oral Given            Scheduled Medications:  apixaban, 5 mg, Oral, BID  aspirin, 81 mg, Oral, Daily  carvedilol, 6.25 mg, Oral, BID With Meals  cyanocobalamin, 1,000 mcg, Oral, Daily  diltiazem, 120 mg, Oral, Daily  ergocalciferol, 50,000 Units, Oral, Weekly  fluticasone, 1 spray, Nasal, Daily  insulin glargine, 7 Units, Subcutaneous, HS  insulin lispro, 1-5 Units, Subcutaneous, HS  insulin lispro, 1-6 Units, Subcutaneous, TID AC  isosorbide mononitrate, 30 mg, Oral, Daily  levothyroxine, 75 mcg, Oral, Daily  melatonin, 3 mg, Oral, HS  mirtazapine, 15 mg, Oral, HS End: 06/09/25 1423   QUEtiapine, 25 mg, Oral, HS      mirtazapine (REMERON) tablet 30 mg  Dose: 30 mg  Freq: Daily at bedtime Route: PO  Start: 06/09/25 2200  gabapentin (NEURONTIN) capsule 100 mg  Dose: 100 mg  Freq: Once Route: PO  Start: 06/09/25 1215 End: 06/09/25 1257  gabapentin (NEURONTIN) capsule 100 mg  Dose: 100 mg  Freq: 2 times daily Route: PO  Start: 06/09/25 1430  Continuous IV Infusions:     PRN Meds:  hydrALAZINE, 5 mg, Intravenous, Q6H PRN  hydrOXYzine HCL, 25 mg, Oral, Q6H PRN- held by provider 6/9 .       ED Triage Vitals   Temperature Pulse Respirations Blood Pressure SpO2 Pain Score   06/07/25 1142 06/07/25 1138 06/07/25 1138 06/07/25 1138 06/07/25 1138 06/07/25 1138   98.1 °F (36.7 °C) 65 20 167/70 100 % 3     Weight (last 2 days)       Date/Time Weight    06/08/25 2207 74 (163.14)    06/07/25 1142 74.5 (164.24)            Vital Signs (last 3 days)       Date/Time Temp Pulse Resp BP MAP (mmHg) SpO2 O2 Device Patient Position - Orthostatic VS Pain    06/09/25 07:15:15 98.3 °F (36.8 °C) 58 17 130/54 79 96 % -- -- --    06/08/25 2207 -- -- -- -- -- -- -- -- No Pain    06/08/25 22:01:03 97.7 °F (36.5 °C) 76 16 125/51 76 98 % -- -- --    06/08/25 2100 -- 67 -- 136/65 93 96 % -- -- --    06/08/25 2027 -- 80 -- -- -- 98 % -- -- --    06/08/25 2023 -- -- -- 200/87 -- -- -- -- --    06/08/25 2000 98.4 °F  (36.9 °C) 78 16 200/87 125 99 % None (Room air) Lying --    06/08/25 1545 -- 81 18 159/72 104 97 % None (Room air) -- --    06/08/25 0805 -- 77 18 166/70 -- 98 % None (Room air) Lying --    06/08/25 0030 -- 70 18 167/70 -- 98 % None (Room air) Lying --    06/07/25 1930 -- 79 18 145/65 93 99 % None (Room air) Lying --    06/07/25 1830 -- 71 18 187/86 124 98 % None (Room air) Lying --    06/07/25 1400 -- 76 18 180/80 115 100 % None (Room air) Sitting --    06/07/25 1142 98.1 °F (36.7 °C) -- -- -- -- -- -- -- --    06/07/25 1138 -- 65 20 167/70 100 100 % None (Room air) Sitting 3              Pertinent Labs/Diagnostic Test Results:   Radiology:  CT head wo contrast   Final Interpretation by Nani Mckee MD (06/07 1906)      No acute intracranial abnormality. In particular, no acute territorial infarct or intracranial hemorrhage.                     Workstation performed: [id number]         XR chest 1 view portable   ED Interpretation by Konrad Dooley DO (06/07 1409)   Lungs are clear without focal consolidation or pneumothorax.  No pleural effusion.  Cardio mediastinal silhouette appears similar to prior chest x-ray done on 5/29/2025.  No significant change when compared to prior chest x-ray done on 5/29/2025.      Final Interpretation by Vidya Hawthorne MD (06/08 3239)      No acute cardiopulmonary disease.            Workstation performed: [id number]           Cardiology:  ECG 12 lead   Final Result by Torrey Cao MD (06/07 1210)   Normal sinus rhythm with sinus arrhythmia   Septal infarct (cited on or before 23-Apr-2025)   Abnormal ECG   When compared with ECG of 29-May-2025 17:48,   Nonspecific T wave abnormality has replaced inverted T waves in Anterior    leads   Confirmed by Torrey Cao ([id number]) on 6/7/2025 12:10:41 PM                Results from last 7 days   Lab Units 06/08/25  0631 06/07/25  1214   WBC Thousand/uL 7.21 9.49   HEMOGLOBIN g/dL 10.5* 11.4*   HEMATOCRIT % 32.3* 34.3*   PLATELETS  Thousands/uL 268 291   TOTAL NEUT ABS Thousands/µL 4.48 6.34         Results from last 7 days   Lab Units 06/08/25  0631 06/07/25  1214   SODIUM mmol/L 140 139   POTASSIUM mmol/L 3.6 4.0   CHLORIDE mmol/L 112* 109*   CO2 mmol/L 23 19*   ANION GAP mmol/L 5 11   BUN mg/dL 16 19   CREATININE mg/dL 1.21 1.32*   EGFR ml/min/1.73sq m 42 38   CALCIUM mg/dL 8.9 9.5     Results from last 7 days   Lab Units 06/07/25  1214   AST U/L 14   ALT U/L 11   ALK PHOS U/L 76   TOTAL PROTEIN g/dL 7.3   ALBUMIN g/dL 3.8   TOTAL BILIRUBIN mg/dL 0.60     Results from last 7 days   Lab Units 06/09/25  0716 06/08/25  2201 06/08/25  1748 06/08/25  1319 06/07/25  2154 06/07/25  1808 06/07/25  1512 06/02/25  1657 06/02/25  1116   POC GLUCOSE mg/dl 130 166* 160* 159* 169* 143* 169* 145* 138     Results from last 7 days   Lab Units 06/08/25  0631 06/07/25  1214   GLUCOSE RANDOM mg/dL 136 249*         Results from last 7 days   Lab Units 06/08/25  0631   HEMOGLOBIN A1C % 9.3*   EAG mg/dl 220     Beta- Hydroxybutyrate   Date Value Ref Range Status   03/04/2025 0.06 0.02 - 0.27 mmol/L Final   02/11/2025 0.19 0.02 - 0.27 mmol/L Final                      Results from last 7 days   Lab Units 06/07/25  1415 06/07/25  1214   HS TNI 0HR ng/L  --  8   HS TNI 2HR ng/L 7  --    HSTNI D2 ng/L -1  --              Results from last 7 days   Lab Units 06/07/25  1415   TSH 3RD GENERATON uIU/mL 3.795                     Results from last 7 days   Lab Units 06/07/25  1214   BNP pg/mL 100                     Results from last 7 days   Lab Units 06/07/25  1214   LIPASE u/L 22                 Results from last 7 days   Lab Units 06/07/25  2115   CLARITY UA  Clear   COLOR UA  Light Yellow   SPEC GRAV UA  1.009   PH UA  5.5   GLUCOSE UA mg/dl Negative   KETONES UA mg/dl Negative   BLOOD UA  Negative   PROTEIN UA mg/dl Negative   NITRITE UA  Negative   BILIRUBIN UA  Negative   UROBILINOGEN UA (BE) mg/dl <2.0   LEUKOCYTES UA  Moderate*   WBC UA /hpf 30-50*   RBC UA /hpf  1-2   BACTERIA UA /hpf Occasional   EPITHELIAL CELLS WET PREP /hpf Occasional                     Past Medical History[1]  Present on Admission:   Cognitive and behavioral changes   Essential hypertension   CAD s/p CABG   Recent diagnosis pulmonary embolus and DVT (HCC)   Stage 3b chronic kidney disease (HCC)   Hypothyroidism      Admitting Diagnosis: Hallucinations [R44.3]  Shortness of breath [R06.02]  Panic disorder [F41.0]  Agitation [R45.1]  Paranoia (HCC) [F22]  Sundowning [F05]  Psychosocial stressors [Z65.8]  Age/Sex: 79 y.o. female    Network Utilization Review Department  ATTENTION: Please call with any questions or concerns to [phone] and carefully listen to the prompts so that you are directed to the right person. All voicemails are confidential.   For Discharge needs, contact Care Management DC Support Team at [phone] opt. 2  Send all requests for admission clinical reviews, approved or denied determinations and any other requests to dedicated fax number below belonging to the campus where the patient is receiving treatment. List of dedicated fax numbers for the Facilities:  FACILITY NAME UR FAX NUMBER   ADMISSION DENIALS (Administrative/Medical Necessity) [phone]   DISCHARGE SUPPORT TEAM (NETWORK) [phone]   PARENT CHILD HEALTH (Maternity/NICU/Pediatrics) [phone]   [facility] [phone]   [facility] [phone]   [facility] [phone]   [facility] [phone]   [facility] [phone]   [facility] [phone]   General acute hospital [phone]   [facility] [phone]   [facility] [phone]   Psychiatric hospital [phone]   [facility] [phone]   [facility]  [facility] [place] [phone]              [1]   Past Medical History:  Diagnosis Date    Acute embolism and thrombosis of unspecified deep veins of unspecified lower extremity (HCC)     Last Assessed:  5/18/17    Anemia     Anosmia     Anxiety     Arthritis     Asthma     Back pain     Bilateral macular retinal edema     CAD (coronary artery disease)     Cataract     Cervical disc herniation     Cervical radiculopathy     Cervical spinal stenosis     Cervical spondylolysis     Chronic kidney disease     Chronic mastoiditis     Colon polyp     Complex endometrial hyperplasia     Depression     Diabetes mellitus (HCC)     Disease of thyroid gland     DVT (deep venous thrombosis) (HCC)     DVT (deep venous thrombosis) (HCC) 06/16/2017    Fibromyalgia     Fibromyalgia, primary     Hyperlipidemia     Hypertension     Hypothyroid     Kidney stone     Lumbar radiculopathy     Neck pain     Obese     PONV (postoperative nausea and vomiting)     Shingles 07/01/2021    Spinal stenosis     Stomach ulcer     Thyroid disease     Toxic metabolic encephalopathy 02/11/2025    Vascular claudication (HCC) 12/11/2017

## 2025-06-09 NOTE — ASSESSMENT & PLAN NOTE
Patient noted to be anxious  Mini cog 4/5.  CT head shows microangiopathic changes  Recently patient seems to need assistance with IADLs  Will continue to provide supportive care, reorient as needed.  Patient is at high risk for delirium, will monitor closely and place on delirium precautions.  Maintain sleep/wake cycle.  Optimize pain regimen.  Monitor for constipation and urinary retention and manage as needed.  Check B12 level and TSH  Encourage family to visit.  Encourage to wear glasses and hearing aids while awake.  Encourage po intake, assist with feeding if needed.   Increase mirtazapine to 30 mg nightly.  Continue Seroquel 25 mg nightly and continue to monitor Qtc  Start gabapentin 100 mg at 1 PM and 8 PM for anxiety  Avoid use of hydroxyzine in the future  Follow-up with geriatrics as outpatient.  I would recommend supervision with medication at discharge.   For today drive test if patient is considering driving in the future

## 2025-06-10 ENCOUNTER — PATIENT OUTREACH (OUTPATIENT)
Dept: CASE MANAGEMENT | Facility: OTHER | Age: 80
End: 2025-06-10

## 2025-06-10 ENCOUNTER — HOME HEALTH ADMISSION (OUTPATIENT)
Dept: HOME HEALTH SERVICES | Facility: HOME HEALTHCARE | Age: 80
End: 2025-06-10
Payer: COMMERCIAL

## 2025-06-10 ENCOUNTER — TELEPHONE (OUTPATIENT)
Age: 80
End: 2025-06-10

## 2025-06-10 VITALS
WEIGHT: 163.14 LBS | TEMPERATURE: 98.1 F | HEART RATE: 51 BPM | HEIGHT: 61 IN | DIASTOLIC BLOOD PRESSURE: 59 MMHG | BODY MASS INDEX: 30.8 KG/M2 | RESPIRATION RATE: 16 BRPM | OXYGEN SATURATION: 98 % | SYSTOLIC BLOOD PRESSURE: 136 MMHG

## 2025-06-10 LAB
GLUCOSE SERPL-MCNC: 137 MG/DL (ref 65–140)
GLUCOSE SERPL-MCNC: 232 MG/DL (ref 65–140)
VIT B12 SERPL-MCNC: 1325 PG/ML (ref 180–914)

## 2025-06-10 PROCEDURE — 82948 REAGENT STRIP/BLOOD GLUCOSE: CPT

## 2025-06-10 PROCEDURE — 82607 VITAMIN B-12: CPT | Performed by: FAMILY MEDICINE

## 2025-06-10 PROCEDURE — 92610 EVALUATE SWALLOWING FUNCTION: CPT

## 2025-06-10 PROCEDURE — 99239 HOSP IP/OBS DSCHRG MGMT >30: CPT | Performed by: PHYSICIAN ASSISTANT

## 2025-06-10 RX ORDER — GABAPENTIN 100 MG/1
100 CAPSULE ORAL 2 TIMES DAILY
Qty: 60 CAPSULE | Refills: 0 | Status: SHIPPED | OUTPATIENT
Start: 2025-06-10

## 2025-06-10 RX ORDER — ERGOCALCIFEROL 1.25 MG/1
50000 CAPSULE, LIQUID FILLED ORAL WEEKLY
Qty: 4 CAPSULE | Refills: 0 | Status: SHIPPED | OUTPATIENT
Start: 2025-06-15

## 2025-06-10 RX ORDER — MIRTAZAPINE 30 MG/1
30 TABLET, FILM COATED ORAL
Qty: 30 TABLET | Refills: 0 | Status: SHIPPED | OUTPATIENT
Start: 2025-06-10

## 2025-06-10 RX ADMIN — CARVEDILOL 6.25 MG: 6.25 TABLET, FILM COATED ORAL at 09:04

## 2025-06-10 RX ADMIN — ASPIRIN 81 MG: 81 TABLET ORAL at 09:04

## 2025-06-10 RX ADMIN — APIXABAN 5 MG: 5 TABLET, FILM COATED ORAL at 09:04

## 2025-06-10 RX ADMIN — LEVOTHYROXINE SODIUM 75 MCG: 75 TABLET ORAL at 08:18

## 2025-06-10 RX ADMIN — ISOSORBIDE MONONITRATE 30 MG: 30 TABLET, EXTENDED RELEASE ORAL at 09:04

## 2025-06-10 RX ADMIN — CYANOCOBALAMIN TAB 500 MCG 1000 MCG: 500 TAB at 09:04

## 2025-06-10 RX ADMIN — DILTIAZEM HYDROCHLORIDE 120 MG: 120 CAPSULE, COATED, EXTENDED RELEASE ORAL at 09:04

## 2025-06-10 NOTE — DISCHARGE SUMMARY
Discharge Summary - Hospitalist   Name: Yajaira Marsh 79 y.o. female I MRN: [id number]  Unit/Bed#: W -01 I Date of Admission: 6/7/2025   Date of Service: 6/10/2025 I Hospital Day: 3     Assessment & Plan  Cognitive and behavioral changes  Brought in by family due to concerns of waxing and waning mentation and agitation particularly later in the day suggestive of sundowning, with difficulty caring for patient in the home setting  She was seen by neuropsychiatry on recent admission at [place] --deemed to have capacity  Patient was evaluated by geriatrics and is on Remeron and Seroquel but as per daughter, patient does not take her medications regularly at home.  Geriatrics is recommending addition of gabapentin plus increased Remeron  Seems to have significant component of anxiety  Per PT and OT consultations-- level 3 resource intensity.  Case management provided patient's daughter information about additional resources.  Likely would be a good candidate for assisted living but financial concerns at play    acute/subacute pulmonary embolus and DVT ([facility])  Recently diagnosed last month -no new symptoms regarding this.  Saturating well on room air.  Eliquis- completed the 10 mg initial doses and is now on 5mg BID    DM2 (diabetes mellitus, type 2) ([facility])  Lab Results   Component Value Date    HGBA1C 9.3 (H) 06/08/2025     Recent Labs     06/09/25  1606 06/09/25  2123 06/10/25  0731 06/10/25  1054   POCGLU 126 183* 137 232*   Blood Sugar Average: Last 72 hrs:  (P) [id number]  A1c reflects poor control, but improving  Continue home dose Lantus plus SSI    Stage 3b chronic kidney disease (HCC)  Lab Results   Component Value Date    EGFR 42 06/08/2025    EGFR 38 06/07/2025    EGFR 39 06/01/2025    CREATININE 1.21 06/08/2025    CREATININE 1.32 (H) 06/07/2025    CREATININE 1.30 06/01/2025     Baseline creatinine seems between 1.1-1.4    CAD s/p CABG  Denies any chest pain.  continue home dose aspirin,  statin, Coreg.  Essential hypertension  Blood pressure on the higher side in ER, agitation likely contributing  Continue home dose Cardizem and Coreg.    Moderate protein-calorie malnutrition (HCC)  Malnutrition Findings:   Adult Malnutrition type: Chronic illness  Adult Degree of Malnutrition: Malnutrition of moderate degree  Malnutrition Characteristics: Inadequate energy, Weight loss, Muscle loss                360 Statement: Protein calorie malnutrition r/t inadequate intake as evidenced by <75% energy intake compared to estimated energy needs > 1 month, 12% unintentional weight loss over 4 months, and muscle wasting to temples; currently treated with oral diet. Pt declined oral nutrition supplements.    BMI Findings:           Body mass index is 30.83 kg/m².        Medical Problems       Resolved Problems  Date Reviewed: 6/10/2025   None       Discharging Physician / Practitioner: Ale Gutierrez PA-C  PCP: Sukumar Clemens DO  Admission Date:   Admission Orders (From admission, onward)       Ordered        06/07/25 1602  INPATIENT ADMISSION  Once                          Discharge Date: 06/10/25    Next Steps for Physician/AP Assuming Care:  Follow-up closely with geriatrics for medication management    Test Results Pending at Discharge (will require follow up):  None    Medication Changes for Discharge & Rationale:   Increase Remeron.  Add gabapentin  See after visit summary for reconciled discharge medications provided to patient and/or family.     Consultations During Hospital Stay:  Geriatrics    Procedures Performed:   None    Significant Findings / Test Results:   As above    Incidental Findings:        Hospital Course:   Yajaira Marsh is a 79 y.o. female patient, recently admitted to the hospital and treated for acute DVT and PE, who originally presented to the hospital on 6/7/2025 due to increased confusion and agitation noted at night by family.  They brought her in for concern that they could  "not manage her at home and she was seen in consultation by geriatrics.  Her dose of Remeron was increased and gabapentin was added to assist in managing her confusion and behavioral issues.  No acute reversible issues were identified.  Patient was seen by PT and OT and was deemed to be level 3 resource intensity and therefore did not qualify for short-term rehab.  Visiting nurses were set up and family will continue to work on additional support care measures for patient in the meantime at home      Please see above list of diagnoses and related plan for additional information.     Discharge Day Visit / Exam:   Subjective: No events or concerns reported overnight by nursing staff, no behavioral issues.  Patient denies any complaints at this time  Vitals: Blood Pressure: 136/59 (06/10/25 0727)  Pulse: (!) 51 (06/10/25 0727)  Temperature: 98.1 °F (36.7 °C) (06/10/25 0727)  Temp Source: Oral (06/08/25 2000)  Respirations: 16 (06/10/25 0727)  Height: 5' 1\" (154.9 cm) (06/08/25 2207)  Weight - Scale: 74 kg (163 lb 2.3 oz) (06/08/25 2207)  SpO2: 98 % (06/10/25 0727)  Physical Exam  Vitals reviewed.   Constitutional:       General: She is not in acute distress.     Appearance: Normal appearance. She is obese. She is not ill-appearing, toxic-appearing or diaphoretic.      Comments: Patient seen resting comfortably in bed in no distress   HENT:      Nose: No congestion.     Eyes:      General: No scleral icterus.        Right eye: No discharge.         Left eye: No discharge.       Cardiovascular:      Rate and Rhythm: Normal rate and regular rhythm.      Heart sounds: No murmur heard.  Pulmonary:      Effort: No respiratory distress.      Breath sounds: No stridor. No wheezing, rhonchi or rales.   Abdominal:      General: There is no distension.      Palpations: Abdomen is soft.      Tenderness: There is no abdominal tenderness. There is no guarding.     Musculoskeletal:      Right lower leg: No edema.      Left lower leg: " No edema.     Skin:     Coloration: Skin is not jaundiced or pale.      Findings: No bruising, erythema, lesion or rash.     Neurological:      Mental Status: She is alert. Mental status is at baseline.     Psychiatric:         Mood and Affect: Mood normal.         Behavior: Behavior normal.         Thought Content: Thought content normal.      Comments: Mild anxiously appearing but very pleasant and cooperative          Discussion with Family: Updated  (daughter) via phone.    Discharge instructions/Information to patient and family:   See after visit summary for information provided to patient and family.      Provisions for Follow-Up Care:  See after visit summary for information related to follow-up care and any pertinent home health orders.      Mobility at time of Discharge:   Basic Mobility Inpatient Raw Score: 19  JH-HLM Goal: 6: Walk 10 steps or more  JH-HLM Achieved: 6: Walk 10 steps or more  HLM Goal achieved. Continue to encourage appropriate mobility.     Disposition:   Home with VNA Services (Reminder: Complete face to face encounter)    Planned Readmission: none    Administrative Statements   Discharge Statement:  I have spent a total time of 35 minutes in caring for this patient on the day of the visit/encounter.  Case was discussed with case management.  Bedside nursing rounds performed.    **Please Note: This note may have been constructed using a voice recognition system**

## 2025-06-10 NOTE — ASSESSMENT & PLAN NOTE
Malnutrition Findings:   Adult Malnutrition type: Chronic illness  Adult Degree of Malnutrition: Malnutrition of moderate degree  Malnutrition Characteristics: Inadequate energy, Weight loss, Muscle loss                360 Statement: Protein calorie malnutrition r/t inadequate intake as evidenced by <75% energy intake compared to estimated energy needs > 1 month, 12% unintentional weight loss over 4 months, and muscle wasting to temples; currently treated with oral diet. Pt declined oral nutrition supplements.    BMI Findings:           Body mass index is 30.83 kg/m².

## 2025-06-10 NOTE — PLAN OF CARE
Problem: Prexisting or High Potential for Compromised Skin Integrity  Goal: Skin integrity is maintained or improved  Description: INTERVENTIONS:  - Identify patients at risk for skin breakdown  - Assess and monitor skin integrity including under and around medical devices   - Assess and monitor nutrition and hydration status  - Monitor labs  - Assess for incontinence   - Turn and reposition patient  - Assist with mobility/ambulation  - Relieve pressure over crista prominences   - Avoid friction and shearing  - Provide appropriate hygiene as needed including keeping skin clean and dry  - Evaluate need for skin moisturizer/barrier cream  - Collaborate with interdisciplinary team  - Patient/family teaching  - Consider wound care consult    Assess:  - Review Dante scale daily  - Clean and moisturize skin every   - Inspect skin when repositioning, toileting, and assisting with ADLS  - Assess under medical devices such as  every   - Assess extremities for adequate circulation and sensation     Bed Management:  - Have minimal linens on bed & keep smooth, unwrinkled  - Change linens as needed when moist or perspiring  - Avoid sitting or lying in one position for more than  hours while in bed?Keep HOB at degrees   - Toileting:  - Offer bedside commode  - Assess for incontinence every   - Use incontinent care products after each incontinent episode such as     Activity:  - Mobilize patient  times a day  - Encourage activity and walks on unit  - Encourage or provide ROM exercises   - Turn and reposition patient every  Hours  - Use appropriate equipment to lift or move patient in bed  - Instruct/ Assist with weight shifting every  when out of bed in chair  - Consider limitation of chair time  hour intervals    Skin Care:  - Avoid use of baby powder, tape, friction and shearing, hot water or constrictive clothing  - Relieve pressure over bony prominences using   - Do not massage red bony areas    Next Steps:  - Teach patient  strategies to minimize risks such as   - Consider consults to  interdisciplinary teams such as   Outcome: Progressing     Problem: PAIN - ADULT  Goal: Verbalizes/displays adequate comfort level or baseline comfort level  Description: Interventions:  - Encourage patient to monitor pain and request assistance  - Assess pain using appropriate pain scale  - Administer analgesics as ordered based on type and severity of pain and evaluate response  - Implement non-pharmacological measures as appropriate and evaluate response  - Consider cultural and social influences on pain and pain management  - Notify physician/advanced practitioner if interventions unsuccessful or patient reports new pain  - Educate patient/family on pain management process including their role and importance of  reporting pain   - Provide non-pharmacologic/complimentary pain relief interventions  Outcome: Progressing     Problem: INFECTION - ADULT  Goal: Absence or prevention of progression during hospitalization  Description: INTERVENTIONS:  - Assess and monitor for signs and symptoms of infection  - Monitor lab/diagnostic results  - Monitor all insertion sites, i.e. indwelling lines, tubes, and drains  - Monitor endotracheal if appropriate and nasal secretions for changes in amount and color  - [place] appropriate cooling/warming therapies per order  - Administer medications as ordered  - Instruct and encourage patient and family to use good hand hygiene technique  - Identify and instruct in appropriate isolation precautions for identified infection/condition  Outcome: Progressing  Goal: Absence of fever/infection during neutropenic period  Description: INTERVENTIONS:  - Monitor WBC  - Perform strict hand hygiene  - Limit to healthy visitors only  - No plants, dried, fresh or silk flowers with tirado in patient room  Outcome: Progressing     Problem: SAFETY ADULT  Goal: Patient will remain free of falls  Description: INTERVENTIONS:  - Educate  patient/family on patient safety including physical limitations  - Instruct patient to call for assistance with activity   - Consider consulting OT/PT to assist with strengthening/mobility based on AM PAC & JH-HLM score  - Consult OT/PT to assist with strengthening/mobility   - Keep Call bell within reach  - Keep bed low and locked with side rails adjusted as appropriate  - Keep care items and personal belongings within reach  - Initiate and maintain comfort rounds  - Make Fall Risk Sign visible to staff  - Offer Toileting every  Hours, in advance of need  - Initiate/Maintain alarm  - Obtain necessary fall risk management equipment:   - Apply yellow socks and bracelet for high fall risk patients  - Consider moving patient to room near nurses station  Outcome: Progressing  Goal: Maintain or return to baseline ADL function  Description: INTERVENTIONS:  -  Assess patient's ability to carry out ADLs; assess patient's baseline for ADL function and identify physical deficits which impact ability to perform ADLs (bathing, care of mouth/teeth, toileting, grooming, dressing, etc.)  - Assess/evaluate cause of self-care deficits   - Assess range of motion  - Assess patient's mobility; develop plan if impaired  - Assess patient's need for assistive devices and provide as appropriate  - Encourage maximum independence but intervene and supervise when necessary  - Involve family in performance of ADLs  - Assess for home care needs following discharge   - Consider OT consult to assist with ADL evaluation and planning for discharge  - Provide patient education as appropriate  - Monitor functional capacity and physical performance, use of AM PAC & JH-HLM   - Monitor gait, balance and fatigue with ambulation    Outcome: Progressing  Goal: Maintains/Returns to pre admission functional level  Description: INTERVENTIONS:  - Perform AM-PAC 6 Click Basic Mobility/ Daily Activity assessment daily.  - Set and communicate daily mobility goal  to care team and patient/family/caregiver.   - Collaborate with rehabilitation services on mobility goals if consulted  - Perform Range of Motion  times a day.  - Reposition patient every  hours.  - Dangle patient  times a day  - Stand patient  times a day  - Ambulate patient  times a day  - Out of bed to chair  times a day   - Out of bed for meals times a day  - Out of bed for toileting  - Record patient progress and toleration of activity level   Outcome: Progressing     Problem: DISCHARGE PLANNING  Goal: Discharge to home or other facility with appropriate resources  Description: INTERVENTIONS:  - Identify barriers to discharge w/patient and caregiver  - Arrange for needed discharge resources and transportation as appropriate  - Identify discharge learning needs (meds, wound care, etc.)  - Arrange for interpretive services to assist at discharge as needed  - Refer to Case Management Department for coordinating discharge planning if the patient needs post-hospital services based on physician/advanced practitioner order or complex needs related to functional status, cognitive ability, or social support system  Outcome: Progressing     Problem: Knowledge Deficit  Goal: Patient/family/caregiver demonstrates understanding of disease process, treatment plan, medications, and discharge instructions  Description: Complete learning assessment and assess knowledge base.  Interventions:  - Provide teaching at level of understanding  - Provide teaching via preferred learning methods  Outcome: Progressing     Problem: Nutrition/Hydration-ADULT  Goal: Nutrient/Hydration intake appropriate for improving, restoring or maintaining nutritional needs  Description: Monitor and assess patient's nutrition/hydration status for malnutrition. Collaborate with interdisciplinary team and initiate plan and interventions as ordered.  Monitor patient's weight and dietary intake as ordered or per policy. Utilize nutrition screening tool and  intervene as necessary. Determine patient's food preferences and provide high-protein, high-caloric foods as appropriate.     INTERVENTIONS:  - Monitor oral intake, urinary output, labs, and treatment plans  - Assess nutrition and hydration status and recommend course of action  - Evaluate amount of meals eaten  - Assist patient with eating if necessary   - Allow adequate time for meals  - Recommend/ encourage appropriate diets, oral nutritional supplements, and vitamin/mineral supplements  - Order, calculate, and assess calorie counts as needed  - Recommend, monitor, and adjust tube feedings and TPN/PPN based on assessed needs  - Assess need for intravenous fluids  - Provide specific nutrition/hydration education as appropriate  - Include patient/family/caregiver in decisions related to nutrition  Outcome: Progressing

## 2025-06-10 NOTE — CASE MANAGEMENT
Case Management Discharge Planning Note    Patient name Yajaira Marsh  Location W /W -01 MRN [id number]  : 1945 Date 6/10/2025       Current Admission Date: 2025  Current Admission Diagnosis:Cognitive and behavioral changes   Patient Active Problem List    Diagnosis Date Noted    Moderate protein-calorie malnutrition (HCC) 2025    acute/subacute pulmonary embolus and DVT (HCC) 2025    Abnormal EKG 2025    Generalized anxiety disorder 05/15/2025    Rhinorrhea 2025    Dry mouth 2025    Cognitive and behavioral changes 2025    Hypokalemia 2025    Hypoglycemia 2025    Ambulatory dysfunction 2025    Essential hypertension 2025    Depressive disorder 2025    Current mild episode of major depressive disorder without prior episode (HCC) 2024    Diarrhea 10/17/2024    Encounter for screening involving social determinants of health (SDoH) 2024    Obesity (BMI 30-39.9) 10/13/2023    Kidney stone 2023    Atherosclerosis of both carotid arteries 07/10/2023    Stage 3b chronic kidney disease (HCC) 07/10/2023    Kidney mass 2023    Fall 2023    Anxiety 2022    Osteoarthritis of spine with radiculopathy, lumbar region 2022    Lumbar radiculopathy 2022    Microalbuminuria 2021    S/P CABG (coronary artery bypass graft) 2021    Apraxia 2021    Herpes zoster without complication 2021    MADAN (generalized anxiety disorder) 2020    SARAY (obstructive sleep apnea) 2018    Allergic rhinitis 2018    Onychomycosis 10/27/2017    Lung nodule 2017    Severe episode of recurrent major depressive disorder, without psychotic features (HCC) 2017    History of DVT (deep vein thrombosis) 2017    CAD s/p CABG 2017    Hyperlipidemia 2017    Hypothyroidism 2017    Spinal stenosis of lumbar region 2017    Vitamin B12 deficiency  12/01/2016    Diabetic polyneuropathy associated with type 2 diabetes mellitus (HCC) 10/07/2015    Vitreo-retinal adhesions 04/21/2015    Adenomatous polyp of colon 11/30/2013    Psoriasis with arthropathy (HCC) 11/05/2013    Chronic mastoiditis 06/06/2013    Vitamin D deficiency 08/31/2012    DM2 (diabetes mellitus, type 2) (HCC) 12/30/2010      LOS (days): 3  Geometric Mean LOS (GMLOS) (days): 4.7  Days to GMLOS:2     OBJECTIVE:  Risk of Unplanned Readmission Score: 42.95         Current admission status: Inpatient   Preferred Pharmacy:   [id number] - [place], [place] - [address]  [facility] [address]  Phone: [phone] Fax: [phone]    Primary Care Provider: Sukumar Clemens DO    Primary Insurance: [employer]  Secondary Insurance:     DISCHARGE DETAILS:    Discharge planning discussed with:: Patient  Freedom of Choice: Yes  Comments - Freedom of Choice: CM spoke to patient about HHC therapy. She is ok with referrals. Referrals sent. Choice list shared with patient. [facility] reserved  CM contacted family/caregiver?: Yes  Were Treatment Team discharge recommendations reviewed with patient/caregiver?: Yes  Did patient/caregiver verbalize understanding of patient care needs?: Yes  Were patient/caregiver advised of the risks associated with not following Treatment Team discharge recommendations?: Yes    Contacts  Patient Contacts: daughter- Kathryn  Relationship to Patient:: Family  Contact Method: Phone  Phone Number: [phone]  Reason/Outcome: Discharge Planning    Requested Home Health Care         Is the patient interested in HHC at discharge?: Yes  Home Health Discipline requested:: Occupational Therapy, Physical Therapy  Home Health Agency Name:: [facility]  Home Health Follow-Up Provider:: PCP  Home Health Services Needed:: Strengthening/Theraputic Exercises to Improve Function, Gait/ADL Training, Evaluate Functional Status and Safety  Supporting Clincal  Findings:: Dyspnea with Exertion, Limited Endurance, Fatigues Easliy in Short Distances         Other Referral/Resources/Interventions Provided:  Interventions: C  Referral Comments: Patient discharging home with [facility]. Daughter to transport

## 2025-06-10 NOTE — SPEECH THERAPY NOTE
Speech Language/Pathology  Speech-Language Pathology Bedside Swallow Evaluation      Patient Name: Yajaira Marsh    Today's Date: 6/10/2025     Problem List  Principal Problem:    Cognitive and behavioral changes  Active Problems:    CAD s/p CABG    DM2 (diabetes mellitus, type 2) (HCC)    Essential hypertension    Stage 3b chronic kidney disease (HCC)    acute/subacute pulmonary embolus and DVT (HCC)    Moderate protein-calorie malnutrition (HCC)      Past Medical History  Past Medical History[1]    Past Surgical History  Past Surgical History[2]    Summary   Pt presents w/ s/s suggestive of functional oropharyngeal swallow skills.     Complete labial seal for retrieval and containment of all materials, no anterior bolus loss present. Adequate bite strength and timely rotary mastication of regular textures. Complete bolus breakdown and adequate bolus transfer. No oral residue noted. Suspected fairly prompt swallow initiation and fair hyolaryngeal elevation to palpation. No overt s/s of aspiration at this time.     Education provided to pt regarding strategies to optimize swallow safety including frequent/thorough oral care and to notify medical staff if s/s of aspiration arise. Pt verbalized understanding and agreement, denies questions or concerns at this time.     Pt w/ increased risk of aspiration 2/2 h/o dysphagia and multiple medical co-morbidities. SLP to s/o as no skilled intervention is required, no further IP ST necessary. Please re-consult if medically indicated.       Recommended Diet: regular diet and thin liquids, no straws   Recommended Form of Meds: as tolerated    Aspiration precautions and swallowing strategies: upright posture, only feed when fully alert, slow rate of feeding, small bites/sips, and no straws  Other Recommendations: Continue frequent oral care        Current Medical Status  Copied from H&P:  Pt is a 79 y.o. female with a PMH of recent diagnosis of pulmonary embolus and DVT on  "Eliquis, cognitive impairment, CAD status post CABG who presents with cognitive and behavioral changes at home.  Patient was recently at [facility] for pulmonary embolus and DVT and was discharged on June 2, 2025 on oral anticoagulation.  PT OT recommended rehab at that point but patient opted to go home.  Patient had significant delirium finding restraints, medication and geriatric evaluation during that hospitalization as well.  She was evaluated by neuropsychiatry and deemed to have capacity to make medical decisions.  Since discharge, per patient's daughter, patient has been intermittently confused with inability to sleep at night.  She has been intermittently verbally aggressive towards her  and occasionally physically aggressive.  She sometimes confuses her medications and misses medication doses per daughter.  Daughter and son have been visiting her as much as they can and providing support although they are not able to provide 24/7 care for her and they are worried about her and her 's safety.  Patient denies any acute discomfort.  She reports she had intermittent lightheadedness earlier but nothing the last couple of days.  She denies any worsening shortness of breath.  No chest pain.  When I asked her why she is in the hospital, she said she was brought into hospital because of her recent blood clots.    Per JOSH Gutierrez 6/9 \"\" Patient told me she is afraid to drink because she is \"afraid to choke.\"  She does admit to having anxiety.\"    Allergies:  No known food allergies    Past medical history:  Please see H&P for details    Special Studies:  6/7/25 CT head wo contrast:  No acute intracranial abnormality. In particular, no acute territorial infarct or intracranial hemorrhage.     6/7/25 XR chest 1 view portable:  No acute cardiopulmonary disease.     History of VFSS:  2/13/25 VFSS      Assessment Summary: Limited assessment due to pt not having her dentures with her for swallow " study, therefore solids not assessed, otherwise pt presented with functional-mild oral and pharyngeal swallowing skills; trace amts of thin liquid penetration to VC noted w/ immed, strong cough response. No aspiration observed.            Recommendations: cont Regular diet w/ thin liquids, no straws  Meds as tolerated   Aspiration precautions        Social/Education/Vocational Hx:  Pt lives w/ spouse     Swallow Information   Current Diet: regular diet and thin liquids   Baseline Diet: regular diet and thin liquids per pt report       Baseline Assessment   Behavior/Cognition: alert  Speech/Language Status: able to participate in conversation and able to follow commands  Patient Positioning: upright at edge of bed   Pain Status/Interventions/Response to Interventions: No report of or nonverbal indications of pain.       Oral Mechanism Exam  Facial: symmetrical  Labial: WFL  Lingual: WFL  Velum: symmetrical  Mandible: adequate ROM  Dentition: full dentures  Vocal quality:clear/adequate   Volitional Cough: strong/productive   Respiratory Status: on RA      Consistencies Assessed and Performance   Consistencies Administered: thin liquids and hard solids    Oral Stage:   Complete labial seal for retrieval and containment of all materials, no anterior bolus loss present. Adequate bite strength and timely rotary mastication of regular textures. Complete bolus breakdown and adequate bolus transfer. No oral residue noted.     Pharyngeal Stage:   Suspected fairly prompt swallow initiation and fair hyolaryngeal elevation to palpation. No overt s/s of aspiration at this time.     Esophageal Concerns: none reported      Summary and Recommendations (see above)    Results Reviewed with: patient, RN, and MD     Treatment Recommended: SLP to s/o as no skilled intervention is required, no furtehr IP ST necessary, please re-consult if medically indicated.            [1]   Past Medical History:  Diagnosis Date    Acute embolism and  thrombosis of unspecified deep veins of unspecified lower extremity (HCC)     Last Assessed:  5/18/17    Anemia     Anosmia     Anxiety     Arthritis     Asthma     Back pain     Bilateral macular retinal edema     CAD (coronary artery disease)     Cataract     Cervical disc herniation     Cervical radiculopathy     Cervical spinal stenosis     Cervical spondylolysis     Chronic kidney disease     Chronic mastoiditis     Colon polyp     Complex endometrial hyperplasia     Depression     Diabetes mellitus (HCC)     Disease of thyroid gland     DVT (deep venous thrombosis) (HCC)     DVT (deep venous thrombosis) (HCC) 06/16/2017    Fibromyalgia     Fibromyalgia, primary     Hyperlipidemia     Hypertension     Hypothyroid     Kidney stone     Lumbar radiculopathy     Neck pain     Obese     PONV (postoperative nausea and vomiting)     Shingles 07/01/2021    Spinal stenosis     Stomach ulcer     Thyroid disease     Toxic metabolic encephalopathy 02/11/2025    Vascular claudication (HCC) 12/11/2017   [2]   Past Surgical History:  Procedure Laterality Date    BACK SURGERY      CARPAL TUNNEL RELEASE      CATARACT EXTRACTION      CHOLECYSTECTOMY      COLONOSCOPY      CORONARY ANGIOPLASTY      CORONARY ARTERY BYPASS GRAFT      CYSTOSCOPY N/A 6/20/2017    Procedure: CYSTOSCOPY;  Surgeon: Tacho Arnold MD;  [facility]: [facility];  Service: Gynecology Oncology    CYSTOSCOPY      AL LAPS TOTAL HYSTERECT 250 GM/< W/RMVL TUBE/OVARY N/A 6/20/2017    Procedure: ROBOTIC HYSTERECTOMY; BILATERAL SALPINO-OOPHERECTOMY; umbilical hernia repair.;  Surgeon: Tacho Arnold MD;  [facility];  Service: Gynecology Oncology    AL REPAIR FIRST ABDOMINAL WALL HERNIA N/A 5/12/2022    Procedure: REPAIR HERNIA INCISIONAL;  Surgeon: Horacio Scherer DO;  [facility];  Service: General    TONSILECTOMY AND ADNOIDECTOMY      TONSILLECTOMY      UMBILICAL HERNIA REPAIR

## 2025-06-10 NOTE — PROGRESS NOTES
Outpatient Care Management note- follow up call, ADT alert- inpatient d/c    Chart review completed    Yajaira was admitted to [facility] from 6/7/25 to 6/10/25 for cognitive and behavioral changes. Noted that the family brought the patient for evaluation due to waxing and waning mentation and agitation later in the day. She was d/c to home with a  referral, increased Remeron 30 mg @ HS, Gabapentin 100 mg BID and SN and PT with  [facility]. Unsure of SOC.     Call made to the patient today and phone number just rings and rings with no answer or access to leave a voicemail message.     Will attempt a 2nd call.

## 2025-06-10 NOTE — TELEPHONE ENCOUNTER
Patient called and was confused about her medication   desloratadine (CLARINEX) 5 MG tablet  she is not sure if she is suppose to take this or if it was ever sent to her pharmacy.    Called office clinical and spoke with Erika and as she was verifying this information the patient disconnected from call.  Please call patient back and advise.

## 2025-06-10 NOTE — TELEPHONE ENCOUNTER
Called patient to review providers message and schedule appt. Phone call was attempted x2 and disconnected. Will reattempt

## 2025-06-10 NOTE — PLAN OF CARE
Problem: Prexisting or High Potential for Compromised Skin Integrity  Goal: Skin integrity is maintained or improved  Description: INTERVENTIONS:  - Identify patients at risk for skin breakdown  - Assess and monitor skin integrity including under and around medical devices   - Assess and monitor nutrition and hydration status  - Monitor labs  - Assess for incontinence   - Turn and reposition patient  - Assist with mobility/ambulation  - Relieve pressure over crista prominences   - Avoid friction and shearing  - Provide appropriate hygiene as needed including keeping skin clean and dry  - Evaluate need for skin moisturizer/barrier cream  - Collaborate with interdisciplinary team  - Patient/family teaching  - Consider wound care consult    Assess:  - Review Dante scale daily  - Clean and moisturize skin every   - Inspect skin when repositioning, toileting, and assisting with ADLS  - Assess under medical devices such as  every   - Assess extremities for adequate circulation and sensation     Bed Management:  - Have minimal linens on bed & keep smooth, unwrinkled  - Change linens as needed when moist or perspiring  - Avoid sitting or lying in one position for more than  hours while in bed?Keep HOB at degrees   - Toileting:  - Offer bedside commode  - Assess for incontinence every   - Use incontinent care products after each incontinent episode such as     Activity:  - Mobilize patient  times a day  - Encourage activity and walks on unit  - Encourage or provide ROM exercises   - Turn and reposition patient every  Hours  - Use appropriate equipment to lift or move patient in bed  - Instruct/ Assist with weight shifting every  when out of bed in chair  - Consider limitation of chair time  hour intervals    Skin Care:  - Avoid use of baby powder, tape, friction and shearing, hot water or constrictive clothing  - Relieve pressure over bony prominences using   - Do not massage red bony areas    Next Steps:  - Teach patient  strategies to minimize risks such as   - Consider consults to  interdisciplinary teams such as   Outcome: Progressing     Problem: PAIN - ADULT  Goal: Verbalizes/displays adequate comfort level or baseline comfort level  Description: Interventions:  - Encourage patient to monitor pain and request assistance  - Assess pain using appropriate pain scale  - Administer analgesics as ordered based on type and severity of pain and evaluate response  - Implement non-pharmacological measures as appropriate and evaluate response  - Consider cultural and social influences on pain and pain management  - Notify physician/advanced practitioner if interventions unsuccessful or patient reports new pain  - Educate patient/family on pain management process including their role and importance of  reporting pain   - Provide non-pharmacologic/complimentary pain relief interventions  Outcome: Progressing     Problem: INFECTION - ADULT  Goal: Absence or prevention of progression during hospitalization  Description: INTERVENTIONS:  - Assess and monitor for signs and symptoms of infection  - Monitor lab/diagnostic results  - Monitor all insertion sites, i.e. indwelling lines, tubes, and drains  - Monitor endotracheal if appropriate and nasal secretions for changes in amount and color  - [place] appropriate cooling/warming therapies per order  - Administer medications as ordered  - Instruct and encourage patient and family to use good hand hygiene technique  - Identify and instruct in appropriate isolation precautions for identified infection/condition  Outcome: Progressing  Goal: Absence of fever/infection during neutropenic period  Description: INTERVENTIONS:  - Monitor WBC  - Perform strict hand hygiene  - Limit to healthy visitors only  - No plants, dried, fresh or silk flowers with tirado in patient room  Outcome: Progressing     Problem: SAFETY ADULT  Goal: Patient will remain free of falls  Description: INTERVENTIONS:  - Educate  patient/family on patient safety including physical limitations  - Instruct patient to call for assistance with activity   - Consider consulting OT/PT to assist with strengthening/mobility based on AM PAC & JH-HLM score  - Consult OT/PT to assist with strengthening/mobility   - Keep Call bell within reach  - Keep bed low and locked with side rails adjusted as appropriate  - Keep care items and personal belongings within reach  - Initiate and maintain comfort rounds  - Make Fall Risk Sign visible to staff  - Offer Toileting every  Hours, in advance of need  - Initiate/Maintain alarm  - Obtain necessary fall risk management equipment:   - Apply yellow socks and bracelet for high fall risk patients  - Consider moving patient to room near nurses station  Outcome: Progressing  Goal: Maintain or return to baseline ADL function  Description: INTERVENTIONS:  -  Assess patient's ability to carry out ADLs; assess patient's baseline for ADL function and identify physical deficits which impact ability to perform ADLs (bathing, care of mouth/teeth, toileting, grooming, dressing, etc.)  - Assess/evaluate cause of self-care deficits   - Assess range of motion  - Assess patient's mobility; develop plan if impaired  - Assess patient's need for assistive devices and provide as appropriate  - Encourage maximum independence but intervene and supervise when necessary  - Involve family in performance of ADLs  - Assess for home care needs following discharge   - Consider OT consult to assist with ADL evaluation and planning for discharge  - Provide patient education as appropriate  - Monitor functional capacity and physical performance, use of AM PAC & JH-HLM   - Monitor gait, balance and fatigue with ambulation    Outcome: Progressing  Goal: Maintains/Returns to pre admission functional level  Description: INTERVENTIONS:  - Perform AM-PAC 6 Click Basic Mobility/ Daily Activity assessment daily.  - Set and communicate daily mobility goal  to care team and patient/family/caregiver.   - Collaborate with rehabilitation services on mobility goals if consulted  - Perform Range of Motion  times a day.  - Reposition patient every  hours.  - Dangle patient  times a day  - Stand patient  times a day  - Ambulate patient  times a day  - Out of bed to chair  times a day   - Out of bed for meals times a day  - Out of bed for toileting  - Record patient progress and toleration of activity level   Outcome: Progressing     Problem: DISCHARGE PLANNING  Goal: Discharge to home or other facility with appropriate resources  Description: INTERVENTIONS:  - Identify barriers to discharge w/patient and caregiver  - Arrange for needed discharge resources and transportation as appropriate  - Identify discharge learning needs (meds, wound care, etc.)  - Arrange for interpretive services to assist at discharge as needed  - Refer to Case Management Department for coordinating discharge planning if the patient needs post-hospital services based on physician/advanced practitioner order or complex needs related to functional status, cognitive ability, or social support system  Outcome: Progressing     Problem: Knowledge Deficit  Goal: Patient/family/caregiver demonstrates understanding of disease process, treatment plan, medications, and discharge instructions  Description: Complete learning assessment and assess knowledge base.  Interventions:  - Provide teaching at level of understanding  - Provide teaching via preferred learning methods  Outcome: Progressing     Problem: Nutrition/Hydration-ADULT  Goal: Nutrient/Hydration intake appropriate for improving, restoring or maintaining nutritional needs  Description: Monitor and assess patient's nutrition/hydration status for malnutrition. Collaborate with interdisciplinary team and initiate plan and interventions as ordered.  Monitor patient's weight and dietary intake as ordered or per policy. Utilize nutrition screening tool and  intervene as necessary. Determine patient's food preferences and provide high-protein, high-caloric foods as appropriate.     INTERVENTIONS:  - Monitor oral intake, urinary output, labs, and treatment plans  - Assess nutrition and hydration status and recommend course of action  - Evaluate amount of meals eaten  - Assist patient with eating if necessary   - Allow adequate time for meals  - Recommend/ encourage appropriate diets, oral nutritional supplements, and vitamin/mineral supplements  - Order, calculate, and assess calorie counts as needed  - Recommend, monitor, and adjust tube feedings and TPN/PPN based on assessed needs  - Assess need for intravenous fluids  - Provide specific nutrition/hydration education as appropriate  - Include patient/family/caregiver in decisions related to nutrition  Outcome: Progressing

## 2025-06-10 NOTE — ASSESSMENT & PLAN NOTE
Blood pressure on the higher side in ER, agitation likely contributing  Continue home dose Cardizem and Coreg.

## 2025-06-10 NOTE — ASSESSMENT & PLAN NOTE
Lab Results   Component Value Date    HGBA1C 9.3 (H) 06/08/2025     Recent Labs     06/09/25  1606 06/09/25  2123 06/10/25  0731 06/10/25  1054   POCGLU 126 183* 137 232*   Blood Sugar Average: Last 72 hrs:  (P) [id number]  A1c reflects poor control, but improving  Continue home dose Lantus plus SSI

## 2025-06-10 NOTE — ASSESSMENT & PLAN NOTE
Lab Results   Component Value Date    EGFR 42 06/08/2025    EGFR 38 06/07/2025    EGFR 39 06/01/2025    CREATININE 1.21 06/08/2025    CREATININE 1.32 (H) 06/07/2025    CREATININE 1.30 06/01/2025     Baseline creatinine seems between 1.1-1.4

## 2025-06-10 NOTE — ASSESSMENT & PLAN NOTE
Brought in by family due to concerns of waxing and waning mentation and agitation particularly later in the day suggestive of sundowning, with difficulty caring for patient in the home setting  She was seen by neuropsychiatry on recent admission at [facility] --deemed to have capacity  Patient was evaluated by geriatrics and is on Remeron and Seroquel but as per daughter, patient does not take her medications regularly at home.  Geriatrics is recommending addition of gabapentin plus increased Remeron  Seems to have significant component of anxiety  Per PT and OT consultations-- level 3 resource intensity.  Case management provided patient's daughter information about additional resources.  Likely would be a good candidate for assisted living but financial concerns at play

## 2025-06-11 ENCOUNTER — HOME CARE VISIT (OUTPATIENT)
Dept: HOME HEALTH SERVICES | Facility: HOME HEALTHCARE | Age: 80
End: 2025-06-11

## 2025-06-11 ENCOUNTER — TELEPHONE (OUTPATIENT)
Dept: INTERNAL MEDICINE CLINIC | Facility: CLINIC | Age: 80
End: 2025-06-11

## 2025-06-11 NOTE — UTILIZATION REVIEW
NOTIFICATION OF ADMISSION DISCHARGE   This is a Notification of Discharge from [facility]. Please be advised that this patient has been discharge from our facility. Below you will find the admission and discharge date and time including the patient’s disposition.   UTILIZATION REVIEW CONTACT:  Utilization Review Assistants  Network Utilization Review Department  Phone: [phone] x carefully listen to the prompts. All voicemails are confidential.  Email: [email or web address]     ADMISSION INFORMATION  PRESENTATION DATE: 6/7/2025 11:33 AM  OBERVATION ADMISSION DATE: N/A  INPATIENT ADMISSION DATE: 6/7/25  4:02 PM   DISCHARGE DATE: 6/10/2025 11:10 AM   DISPOSITION:Home with Home Health Care    [facility] Utilization Review Department  ATTENTION: Please call with any questions or concerns to [phone] and carefully listen to the prompts so that you are directed to the right person. All voicemails are confidential.   For Discharge needs, contact Care Management DC Support Team at [phone] opt. 2  Send all requests for admission clinical reviews, approved or denied determinations and any other requests to dedicated fax number below belonging to the campus where the patient is receiving treatment. List of dedicated fax numbers for the Facilities:  FACILITY NAME UR FAX NUMBER   ADMISSION DENIALS (Administrative/Medical Necessity) [phone]   DISCHARGE SUPPORT TEAM ([facility]) [phone]   PARENT CHILD HEALTH (Maternity/NICU/Pediatrics) [phone]   [facility] [phone]   [facility] [phone]   [facility] [phone]   [facility] [phone]   [facility] [phone]   [facility] [phone]   [facility] [phone]   [facility] [phone]   [facility]  [facility] [phone]   [facility] [phone]   [facility] [phone]   [facility] [phone]

## 2025-06-11 NOTE — TELEPHONE ENCOUNTER
Left message with  to CB.  Pt needs Hospital follow up with PCP or Resident, not a TCM she had one on 6/4/25.

## 2025-06-12 ENCOUNTER — HOME CARE VISIT (OUTPATIENT)
Dept: HOME HEALTH SERVICES | Facility: HOME HEALTHCARE | Age: 80
End: 2025-06-12

## 2025-06-13 ENCOUNTER — HOME CARE VISIT (OUTPATIENT)
Dept: HOME HEALTH SERVICES | Facility: HOME HEALTHCARE | Age: 80
End: 2025-06-13

## 2025-06-14 ENCOUNTER — HOME CARE VISIT (OUTPATIENT)
Dept: HOME HEALTH SERVICES | Facility: HOME HEALTHCARE | Age: 80
End: 2025-06-14
Attending: PHYSICIAN ASSISTANT
Payer: COMMERCIAL

## 2025-06-14 VITALS
SYSTOLIC BLOOD PRESSURE: 135 MMHG | RESPIRATION RATE: 18 BRPM | TEMPERATURE: 97.6 F | DIASTOLIC BLOOD PRESSURE: 75 MMHG | HEART RATE: 73 BPM | OXYGEN SATURATION: 99 %

## 2025-06-14 PROCEDURE — 400013 VN SOC

## 2025-06-14 PROCEDURE — G0299 HHS/HOSPICE OF RN EA 15 MIN: HCPCS

## 2025-06-14 NOTE — Clinical Note
Following were the findings during 6/14/25 med review:    Medication on MAR/EPIC and AVS, but not at home, pt to  in pharmacy: ergocalciferol (VITAMIN D2) 50,000 units.  Medication on MAR/EPIC and AVS, but not at home, pt needs new script: diltiazem (CARDIZEM CD) 120 mg 24 hr capsule.  Medication on MAR/EPIC and AVS, but pt is taking differently: gabapentin (NEURONTIN) 100 mg capsule - Take 1 capsule (100 mg total) by mouth 2 (two) times a day - pt takes 1 capsule daily, PO, at dinner time.  Medication not on MAR/EPIC and AVS, but pt is taking: INSULIN ASPART FLEXPEN SC - per SS - Inject 1 Dose under the skin 3 (three) times a day with meals -  pt states that she only has two meals per day, so she takes this med only 2x per per SS from endocrinology.    Dr. Clemens, can you please send a new script for diltiazem (CARDIZEM CD) 120 mg 24 hr capsule.      Thank you.

## 2025-06-14 NOTE — Clinical Note
Medication discrepancies or Major drug interactions:  Please be advised, MAR/EPIC identified drug interactions for this patient based on current medication profile.  -----------------------------  Medication on MAR/EPIC and AVS, but not at home, pt to  in pharmacy: ergocalciferol (VITAMIN D2) 50,000 units.  Medication on MAR/EPIC and AVS, but not at home, pt needs new script: diltiazem (CARDIZEM CD) 120 mg 24 hr capsule.  Medication on MAR/EPIC and AVS, but pt is taking differently: gabapentin (NEURONTIN) 100 mg capsule - Take 1 capsule (100 mg total) by mouth 2 (two) times a day - pt takes 1 capsule daily, PO, at dinner time.  Medication not on MAR/EPIC and AVS, but pt is taking: INSULIN ASPART FLEXPEN SC - per SS - Inject 1 Dose under the skin 3 (three) times a day with meals -  pt states that she only has two meals per day, so she takes this med only 2x per day.  ----------------------------  This report is informational only, no response is needed.  [facility] A has Admitted your patient to Home Health service with the following disciplines: SN, PT and MSW.  Patient stated goals of care: to get stronger and be treated at home.  Potential barriers to goal achievement: SOB at rest, anxiety/agitation, decreased strength and endurence, impaired mobility, forgetful.  Primary focus of home health care: Pulmonary.  Anticipated visit pattern and next visit date: 1w1, 2w2, 1w2, next SN visit on 6/17/25.  Thank you for allowing us to participate in the care of your patient.      Stacy Hernandez RN.

## 2025-06-15 DIAGNOSIS — I10 ESSENTIAL HYPERTENSION: ICD-10-CM

## 2025-06-15 RX ORDER — DILTIAZEM HYDROCHLORIDE 120 MG/1
120 CAPSULE, COATED, EXTENDED RELEASE ORAL DAILY
Qty: 30 CAPSULE | Refills: 1 | Status: SHIPPED | OUTPATIENT
Start: 2025-06-15 | End: 2025-08-14

## 2025-06-16 ENCOUNTER — OFFICE VISIT (OUTPATIENT)
Dept: INTERNAL MEDICINE CLINIC | Facility: CLINIC | Age: 80
End: 2025-06-16
Payer: COMMERCIAL

## 2025-06-16 ENCOUNTER — HOME CARE VISIT (OUTPATIENT)
Dept: HOME HEALTH SERVICES | Facility: HOME HEALTHCARE | Age: 80
End: 2025-06-16
Payer: COMMERCIAL

## 2025-06-16 ENCOUNTER — TELEPHONE (OUTPATIENT)
Dept: FAMILY MEDICINE CLINIC | Facility: CLINIC | Age: 80
End: 2025-06-16

## 2025-06-16 VITALS
HEART RATE: 84 BPM | BODY MASS INDEX: 29 KG/M2 | WEIGHT: 153.6 LBS | DIASTOLIC BLOOD PRESSURE: 60 MMHG | OXYGEN SATURATION: 100 % | HEIGHT: 61 IN | SYSTOLIC BLOOD PRESSURE: 134 MMHG

## 2025-06-16 DIAGNOSIS — R63.4 WEIGHT LOSS: ICD-10-CM

## 2025-06-16 DIAGNOSIS — I87.2 VENOUS INSUFFICIENCY: ICD-10-CM

## 2025-06-16 DIAGNOSIS — Z79.4 TYPE 2 DIABETES MELLITUS WITH DIABETIC POLYNEUROPATHY, WITH LONG-TERM CURRENT USE OF INSULIN (HCC): Primary | ICD-10-CM

## 2025-06-16 DIAGNOSIS — Z12.39 ENCOUNTER FOR SCREENING FOR MALIGNANT NEOPLASM OF BREAST, UNSPECIFIED SCREENING MODALITY: ICD-10-CM

## 2025-06-16 DIAGNOSIS — F32.89 OTHER DEPRESSION: ICD-10-CM

## 2025-06-16 DIAGNOSIS — E11.42 TYPE 2 DIABETES MELLITUS WITH DIABETIC POLYNEUROPATHY, WITH LONG-TERM CURRENT USE OF INSULIN (HCC): Primary | ICD-10-CM

## 2025-06-16 DIAGNOSIS — R29.898 MUSCULAR DECONDITIONING: ICD-10-CM

## 2025-06-16 DIAGNOSIS — R41.3 MEMORY LOSS: ICD-10-CM

## 2025-06-16 DIAGNOSIS — Z12.31 ENCOUNTER FOR SCREENING MAMMOGRAM FOR MALIGNANT NEOPLASM OF BREAST: ICD-10-CM

## 2025-06-16 PROCEDURE — 99214 OFFICE O/P EST MOD 30 MIN: CPT | Performed by: INTERNAL MEDICINE

## 2025-06-16 PROCEDURE — G2211 COMPLEX E/M VISIT ADD ON: HCPCS | Performed by: INTERNAL MEDICINE

## 2025-06-16 PROCEDURE — G0155 HHCP-SVS OF CSW,EA 15 MIN: HCPCS

## 2025-06-16 NOTE — ASSESSMENT & PLAN NOTE
I have counselled the pt to follow a healthy and balanced diet ,and recommend routine exercise.  I will be ordering diabetic laboratories including comprehensive metabolic panel, hemoglobin A1c, urine microalbumin, lipid panel.  Continue follow-up with endocrinology she has worked with a nutritionist  Lab Results   Component Value Date    HGBA1C 9.3 (H) 06/08/2025     Orders:  •  Albumin / creatinine urine ratio; Future  •  Ambulatory referral to clinical pharmacy; Future  •  Hemoglobin A1C; Future  •  Comprehensive metabolic panel; Future  •  Lipid Panel with Direct LDL reflex; Future

## 2025-06-16 NOTE — TELEPHONE ENCOUNTER
Please contact patient to schedule Clinical Pharmacist Appointment. This encounter will be used to track patient outreaches.     Patient will be contacted on 2 separate days before pharmacy referral is canceled.    Reason for appointment: diabetes management   When to schedule with Pharmacist: anytime  What should the patient bring to the appointment: blood sugar readings      [facility]  Appointment Department: [facility]  Pharmacist patient will be seeing: [facility]  Visit Type Preference (ie Phone, Video, In-person): either based on patient preference  In-person visits will be at: [facility] - [address] [place] [address], [address]   Virtual visits will be completed via Video or Phone - please clarify patient preference in appointment notes. If left unspecified, it will be assumed the appointment will be completed via Epic Embedded platform    If the primary interim pharmacist availability does not align with the patient's availability, patient can schedule with one of the following pharmacists:   Appointment Department: [facility]  In-person visits will be at: [facility] - [address] [place] [address], [address]  Pharmacist associated with this department: Noemy    Appointment Department: [facility]  In-person visits will be at: [facility] - [address] [place]  Pharmacist associated with this department: Blanca

## 2025-06-16 NOTE — PROGRESS NOTES
Name: Yajaira Marsh      : 1945      MRN: [id number]  Encounter Provider: Sukumar Clemens DO  Encounter Date: 2025   Encounter department: [facility]  :Cognitive and behavioral changes  Brought in by family due to concerns of waxing and waning mentation and agitation particularly later in the day suggestive of sundowning, with difficulty caring for patient in the home setting  She was seen by neuropsychiatry on recent admission at [facility] --deemed to have capacity  Patient was evaluated by geriatrics and is on Remeron and Seroquel but as per daughter, patient does not take her medications regularly at home.  Geriatrics is recommending addition of gabapentin plus increased Remeron  Seems to have significant component of anxiety  Per PT and OT consultations-- level 3 resource intensity.  Case management provided patient's daughter information about additional resources.  Likely would be a good candidate for assisted living but financial concerns at play     acute/subacute pulmonary embolus and DVT (HCC)  Recently diagnosed last month -no new symptoms regarding this.  Saturating well on room air.  Eliquis- completed the 10 mg initial doses and is now on 5mg BID   Moderate protein-calorie malnutrition (HCC)  Malnutrition Findings:   Adult Malnutrition type: Chronic illness  Adult Degree of Malnutrition: Malnutrition of moderate degree  Malnutrition Characteristics: Inadequate energy, Weight loss, Muscle loss     360 Statement: Protein calorie malnutrition r/t inadequate intake as evidenced by <75% energy intake compared to estimated energy needs > 1 month, 12% unintentional weight loss over 4 months, and muscle wasting to temples; currently treated with oral diet. Pt declined oral nutrition supplements.     Assessment & Plan  Type 2 diabetes mellitus with diabetic polyneuropathy, with long-term current use of insulin (HCC)  I have counselled the pt to follow a healthy and  balanced diet ,and recommend routine exercise.  I will be ordering diabetic laboratories including comprehensive metabolic panel, hemoglobin A1c, urine microalbumin, lipid panel.  Continue follow-up with endocrinology she has worked with a nutritionist  Lab Results   Component Value Date    HGBA1C 9.3 (H) 06/08/2025     Orders:  •  Albumin / creatinine urine ratio; Future  •  Ambulatory referral to clinical pharmacy; Future  •  Hemoglobin A1C; Future  •  Comprehensive metabolic panel; Future  •  Lipid Panel with Direct LDL reflex; Future    Memory loss  I would like the patient to see Senior care to evaluate for early dementia she did experience delirium during her hospitalization which seems to have improved with adjustment of psychiatric medicines and treatment of anxiety  Orders:  •  Ambulatory Referral to [facility] Care; Future    Weight loss  Will check mammogram see GI for upper endoscopy she does describe the symptom of early satiety she was recently had a CAT scan of the chest abdomen pelvis also patient does report jonathan decrease caloric intake secondary to early satiety will complete GI workup patient has an appointment increase caloric intake increase proteins  Orders:  •  Mammo screening bilateral w cad; Future    Other depression  No SI will have patient see psychiatry she did recently start mirtazapine 30 mg at bedtime, Seroquel 25 mg at bedtime  Orders:  •  Ambulatory referral to Psych Services; Future    Encounter for screening for malignant neoplasm of breast, unspecified screening modality    Orders:  •  Mammo screening bilateral w cad; Future    Muscular deconditioning  Related the patient to start physical therapy  Orders:  •  Referral to Home Health- [facility]; Future    Encounter for screening mammogram for malignant neoplasm of breast    Orders:  •  Mammo screening bilateral w cad; Future    Venous insufficiency  Leg elevation reduction of sodium use compression stocking  Orders:  •   Compression Stocking    I did  the patient and her  about working together, they have been difficulty with organization of bills, the patient is currently on 1 system and the  would like to do it a different way they will sit down and work together to find a unified way  I have spent a total time of 30 minutes in caring for this patient on the day of the visit/encounter including Diagnostic results, Instructions for management, Impressions, Counseling / Coordination of care, Documenting in the medical record, Reviewing/placing orders in the medical record (including tests, medications, and/or procedures), and Obtaining or reviewing history  .      History of Present Illness   HPI 79-year old female coming in for a follow up office visit regarding type 2 diabetes, memory loss, weight loss, depression, muscular deconditioning and venous insufficiency; 1 hospitalization in regards to delirium I did review the hospitalization with the patient her medications were adjusted during the hospitalization she is now taking Seroquel 25 mg at bedtime using the gabapenting in the evening touble getting up less confused  eating break, lunch , supper , taking insulin only 2 meals foot swelling sleep still on the couch upright she reports me that she feels nervous to lay flat.  She does have a recliner and has agreed to start laying on the recliner reports to me some improvement in her sleep and anxiety levels.  Her cognition has improved since going on the Seroquel and mirtazapine.  She is working on her diet she is seeing a nutritionist in the hospital of her  helps to get food at the hospital but he has his own medical problems.  She does not want to leave her house she does not want [facility] she has not started physical therapy at this point.  she has noted some chronic ankle swelling no shortness of breath no chest pain no palpitations we did review her discharge from your  "laboratories and test she reports me the gabapentin she only takes at nighttime when she takes it during the day she is too tired  Review of Systems   Constitutional:  Negative for activity change, appetite change and unexpected weight change.   HENT:  Negative for congestion and postnasal drip.    Eyes:  Negative for visual disturbance.   Respiratory:  Negative for cough and shortness of breath.    Cardiovascular:  Negative for chest pain.   Gastrointestinal:  Negative for abdominal pain, diarrhea, nausea and vomiting.   Neurological:  Negative for dizziness, light-headedness and headaches.   Hematological:  Negative for adenopathy.   Depression no SI deconditioning ankle edema chronic    Objective   /60 (BP Location: Left arm, Patient Position: Sitting, Cuff Size: Standard)   Pulse 84   Ht 5' 1\" (1.549 m)   Wt 69.7 kg (153 lb 9.6 oz)   SpO2 100%   BMI 29.02 kg/m²      Physical Exam  Constitutional:       Appearance: She is well-developed.   HENT:      Head: Normocephalic.     Eyes:      General: No scleral icterus.        Right eye: No discharge.         Left eye: No discharge.      Conjunctiva/sclera: Conjunctivae normal.      Pupils: Pupils are equal, round, and reactive to light.       Cardiovascular:      Rate and Rhythm: Normal rate and regular rhythm.      Heart sounds: Normal heart sounds. No murmur heard.     No friction rub. No gallop.   Pulmonary:      Effort: No respiratory distress.      Breath sounds: Normal breath sounds. No wheezing or rales.   Abdominal:      General: Bowel sounds are normal. There is no distension.      Palpations: Abdomen is soft. There is no mass.      Tenderness: There is no abdominal tenderness. There is no guarding or rebound.     Musculoskeletal:         General: No deformity.      Cervical back: Neck supple.   Lymphadenopathy:      Cervical: No cervical adenopathy.     Neurological:      Mental Status: She is alert.      Coordination: Coordination normal. "     Bilateral ankle edema mild  Administrative Statements   I have spent a total time of 30 minutes in caring for this patient on the day of the visit/encounter including Diagnostic results, Instructions for management, Impressions, Counseling / Coordination of care, Documenting in the medical record, Reviewing/placing orders in the medical record (including tests, medications, and/or procedures), and Obtaining or reviewing history  .

## 2025-06-17 ENCOUNTER — TELEPHONE (OUTPATIENT)
Dept: INTERNAL MEDICINE CLINIC | Facility: CLINIC | Age: 80
End: 2025-06-17

## 2025-06-17 ENCOUNTER — TELEPHONE (OUTPATIENT)
Age: 80
End: 2025-06-17

## 2025-06-17 ENCOUNTER — HOME CARE VISIT (OUTPATIENT)
Dept: HOME HEALTH SERVICES | Facility: HOME HEALTHCARE | Age: 80
End: 2025-06-17
Payer: COMMERCIAL

## 2025-06-17 ENCOUNTER — PATIENT OUTREACH (OUTPATIENT)
Dept: CASE MANAGEMENT | Facility: OTHER | Age: 80
End: 2025-06-17

## 2025-06-17 VITALS
SYSTOLIC BLOOD PRESSURE: 122 MMHG | OXYGEN SATURATION: 90 % | HEART RATE: 62 BPM | DIASTOLIC BLOOD PRESSURE: 64 MMHG | TEMPERATURE: 97.3 F

## 2025-06-17 PROCEDURE — G0299 HHS/HOSPICE OF RN EA 15 MIN: HCPCS

## 2025-06-17 NOTE — TELEPHONE ENCOUNTER
Samira from [facility] called in on behalf of the patient.  She was calling to see if a TCM was scheduled.  No appointment scheduled.  She will reach out to the patient.  No further action.

## 2025-06-17 NOTE — LETTER
Date: 06/17/25    Dear Yajaira Marsh,   My name is Marilia; I am a registered nurse care manager working with [facility] Care Management Department within [facility].   I have not been able to reach you and would like to set a time that I can talk with you over the phone.  My work is to help patients that have complex medical conditions get the care they need. This includes patients who may have been in the hospital or emergency room.    Please call me with any questions you may have. I look forward to speaking with you.    Sincerely,  Marilia JUDD, RN  [phone]  Outpatient Registered Nurse Care Manager

## 2025-06-17 NOTE — TELEPHONE ENCOUNTER
----- Message from Sukumar Clemens DO sent at 6/16/2025  4:24 PM EDT -----  Please let the patient know I sent an order in for a compression stocking that she can use put on in the a.m. take off before bedtime   No

## 2025-06-17 NOTE — PROGRESS NOTES
Outpatient Care Management note- follow up call    Chart review completed    Last RN CM outreach on 6/10/25 post hospital stay. Outreach to the patient but unable to leave a voicemail as there was no option, just rang several times.     2nd attempt today and same as above, no access to leave a message. A follow up unable to reach letter was sent via [employer].     Await a response from the patient.

## 2025-06-18 ENCOUNTER — HOME CARE VISIT (OUTPATIENT)
Dept: HOME HEALTH SERVICES | Facility: HOME HEALTHCARE | Age: 80
End: 2025-06-18
Payer: COMMERCIAL

## 2025-06-18 VITALS
DIASTOLIC BLOOD PRESSURE: 62 MMHG | RESPIRATION RATE: 20 BRPM | SYSTOLIC BLOOD PRESSURE: 124 MMHG | OXYGEN SATURATION: 98 %

## 2025-06-18 PROCEDURE — G0151 HHCP-SERV OF PT,EA 15 MIN: HCPCS

## 2025-06-18 NOTE — TELEPHONE ENCOUNTER
"Spoke with patient. Per patient \"she just does not know, still not feeling well\"  Advised to call if she wishes to schedule and would let PCP and pharmacy know.  "

## 2025-06-19 ENCOUNTER — TELEPHONE (OUTPATIENT)
Age: 80
End: 2025-06-19

## 2025-06-19 NOTE — TELEPHONE ENCOUNTER
I do not see anything in her discharge summary regarding follow-up with cardiology.  She should be seen for posthospitalization follow-up.  Please schedule her for a TCM visit.

## 2025-06-19 NOTE — TELEPHONE ENCOUNTER
FYI- spoke with patient regarding cardiology. She said that when at the hospital they said something about her heart. Reviewed they did not refer her to cardiology. She stated that not currently having problems but did have a cardiologist in past Dr Montalvo but retired. Reviewed that if this was something she wanted we would need to schedule an appt to review with provider. She did not wish to schedule.    Dr Clemens can this wait till next appt in July at [facility].     Patient was seen on 6/16 for hospital follow up because Tcm was completed already within 30 day time frame.

## 2025-06-19 NOTE — TELEPHONE ENCOUNTER
Patient called in today, asking for a referral to Cardiology.    Patient states that she is not currently, having any signs or symptoms.  She was hospitalized from, 6/7-6/10 and is was recommended to follow up with cardiology.    Please advise.

## 2025-06-20 ENCOUNTER — HOME CARE VISIT (OUTPATIENT)
Dept: HOME HEALTH SERVICES | Facility: HOME HEALTHCARE | Age: 80
End: 2025-06-20
Payer: COMMERCIAL

## 2025-06-20 VITALS
HEART RATE: 70 BPM | TEMPERATURE: 97.9 F | OXYGEN SATURATION: 96 % | RESPIRATION RATE: 16 BRPM | DIASTOLIC BLOOD PRESSURE: 60 MMHG | SYSTOLIC BLOOD PRESSURE: 116 MMHG

## 2025-06-20 PROCEDURE — G0299 HHS/HOSPICE OF RN EA 15 MIN: HCPCS

## 2025-06-21 PROCEDURE — G0180 MD CERTIFICATION HHA PATIENT: HCPCS | Performed by: INTERNAL MEDICINE

## 2025-06-23 DIAGNOSIS — Z95.1 S/P CABG (CORONARY ARTERY BYPASS GRAFT): Primary | ICD-10-CM

## 2025-06-24 ENCOUNTER — OFFICE VISIT (OUTPATIENT)
Dept: ENDOCRINOLOGY | Facility: CLINIC | Age: 80
End: 2025-06-24
Payer: COMMERCIAL

## 2025-06-24 ENCOUNTER — HOME CARE VISIT (OUTPATIENT)
Dept: HOME HEALTH SERVICES | Facility: HOME HEALTHCARE | Age: 80
End: 2025-06-24
Payer: COMMERCIAL

## 2025-06-24 VITALS
TEMPERATURE: 96.9 F | BODY MASS INDEX: 28.89 KG/M2 | DIASTOLIC BLOOD PRESSURE: 68 MMHG | HEART RATE: 71 BPM | OXYGEN SATURATION: 98 % | WEIGHT: 153 LBS | SYSTOLIC BLOOD PRESSURE: 136 MMHG | HEIGHT: 61 IN

## 2025-06-24 VITALS
TEMPERATURE: 97.3 F | OXYGEN SATURATION: 98 % | RESPIRATION RATE: 16 BRPM | SYSTOLIC BLOOD PRESSURE: 146 MMHG | DIASTOLIC BLOOD PRESSURE: 78 MMHG | HEART RATE: 78 BPM

## 2025-06-24 DIAGNOSIS — E11.65 TYPE 2 DIABETES MELLITUS WITH HYPERGLYCEMIA, WITH LONG-TERM CURRENT USE OF INSULIN (HCC): Primary | ICD-10-CM

## 2025-06-24 DIAGNOSIS — E06.3 HYPOTHYROIDISM DUE TO HASHIMOTO THYROIDITIS: ICD-10-CM

## 2025-06-24 DIAGNOSIS — Z79.4 TYPE 2 DIABETES MELLITUS WITH HYPERGLYCEMIA, WITH LONG-TERM CURRENT USE OF INSULIN (HCC): Primary | ICD-10-CM

## 2025-06-24 PROCEDURE — 99214 OFFICE O/P EST MOD 30 MIN: CPT | Performed by: INTERNAL MEDICINE

## 2025-06-24 PROCEDURE — G2211 COMPLEX E/M VISIT ADD ON: HCPCS | Performed by: INTERNAL MEDICINE

## 2025-06-24 PROCEDURE — G0299 HHS/HOSPICE OF RN EA 15 MIN: HCPCS

## 2025-06-24 NOTE — PROGRESS NOTES
"    Follow-up Patient Progress Note      CC: Type 2 diabetes, weight loss     History of Present Illness:   79yr female with Type 2 Diabetes since 1994, HTN, HLD, CKD3 eGFR 37, Hypothyroidism, CAD, DJD and vit D deficiency. Last visit was 5/2/25.     Since last visit, she lost 3 lbs. She was recently discharged from hospital with basal insulin at 7u qhs     CGM data review::  Device: Dexcom          Dates:  3/6/25-3/18/25             Usage: 93 %   Av glu: 228 mg/dL                   SD: 66 mg/dL        CV: 29.1 %      GMI: 8.8 %  TIR: 19 %        TAR: 45+35 %             TBR: 1  %     Glycemic patters:  some fasting and significant pp hyperglycemia.  Hypoglycemia: No     Current meds:  Toujeo 7u  Novolog 12-16u TIDAC     Opthamology: yes  Podiatry:   vaccination:   Dental:  Pancreatitis:      Ace/ARB: losartan  Statin:rosuvastatin  Thyroid issues: levothyroxine 75mcg qdaily.     FHx: father had diabetes and CKD requiring PD.    Physical Exam:  Body mass index is 29.02 kg/m².  Ht 5' 1\" (1.549 m)   BMI 29.02 kg/m²    There were no vitals filed for this visit.     Physical Exam  Constitutional:       General: She is not in acute distress.     Appearance: She is well-developed.   HENT:      Head: Normocephalic and atraumatic.      Nose: Nose normal.     Eyes:      Conjunctiva/sclera: Conjunctivae normal.     Pulmonary:      Effort: Pulmonary effort is normal.   Abdominal:      General: There is no distension.     Musculoskeletal:      Cervical back: Normal range of motion and neck supple.     Skin:     Findings: No rash.      Comments: No icterus     Neurological:      Mental Status: She is alert and oriented to person, place, and time.       Labs:   Lab Results   Component Value Date    HGBA1C 9.3 (H) 06/08/2025       Lab Results   Component Value Date    EDM3YHOZVHKN 3.795 06/07/2025    TSH 2.95 02/03/2023       Lab Results   Component Value Date    CREATININE 1.21 06/08/2025    CREATININE 1.32 (H) 06/07/2025    " CREATININE 1.30 06/01/2025    BUN 16 06/08/2025     11/16/2017    K 3.6 06/08/2025     (H) 06/08/2025    CO2 23 06/08/2025     eGFRcr   Date Value Ref Range Status   03/07/2025 37 (L) >59 Final     eGFR   Date Value Ref Range Status   06/08/2025 42 ml/min/1.73sq m Final       Lab Results   Component Value Date    ALT 11 06/07/2025    AST 14 06/07/2025    ALKPHOS 76 06/07/2025    BILITOT 0.5 11/16/2017       Lab Results   Component Value Date    CHOLESTEROL 107 03/25/2025    CHOLESTEROL 135 12/13/2021     Lab Results   Component Value Date    HDL 32 (L) 03/25/2025    HDL 52 12/13/2021    HDL 44 (L) 11/16/2017     Lab Results   Component Value Date    TRIG 143 03/25/2025    TRIG 111 12/13/2021    TRIG 147 11/16/2017     Lab Results   Component Value Date    NONHDLC 75 03/25/2025    NONHDLC 117 11/16/2017    NONHDLC 131 02/19/2016         Assessment/Plan:    1. Type 2 diabetes mellitus with hyperglycemia, with long-term current use of insulin ([facility])  Assessment & Plan:  She seems very uncontrolled with reported hyperglycemia and hypoglycemia. She was recently hospitalized and since then she is not coping.  is 84 yr old and frail also. She has poor social determinants of health and at risk for brittle diabetes. Diabetes is complicated with CKD3 eGFR 37.    She has not been using the insulin regimen Rxed last visit. Since she lost weight, we will reduce insulin regimen as listed below.    Advised to maintain goal blood sugars 70-180mg/dL.       Novolog Insulin( skip this dose if you skip a meal)    4u    4u    4u     Regular, Apidra, Humalog orNovolog Sliding Scale:   <80                      151-200 + 1 +1 +1     201-250 +2 +2 +2 +   251-300 +3 +3 +3 +   301-350 +4 +4 +4 +   >350 +5 +5 +5 +      Lantus/Toujeo Insulin       8u     Not suing cgm - will submit via DME for Eayun 3 plus to reduce burden of use as dexcom is more complicated interface.    Follow up in 2 months.      Lab Results    Component Value Date    HGBA1C 9.3 (H) 06/08/2025     2. Hypothyroidism due to Hashimoto thyroiditis  Assessment & Plan:  She seems clinically stable.  We agreed to continue levothyroxine 75mcg qdaily and repeat labs prior to next visit.        I have spent a total time of  minutes on 06/24/25 in caring for this patient including greater than 50% of this time was spent in counseling/coordination of care as listed above.       Discussed with the patient and all questioned fully answered. She will contact me with concerns.    Ranjith Pope

## 2025-06-24 NOTE — ASSESSMENT & PLAN NOTE
She seems very uncontrolled with reported hyperglycemia and hypoglycemia. She was recently hospitalized and since then she is not coping.  is 84 yr old and frail also. She has poor social determinants of health and at risk for brittle diabetes. Diabetes is complicated with CKD3 eGFR 37.    She has not been using the insulin regimen Rxed last visit. Since she lost weight, we will reduce insulin regimen as listed below.    Advised to maintain goal blood sugars 70-180mg/dL.       Novolog Insulin( skip this dose if you skip a meal)    4u    4u    4u     Regular, Apidra, Humalog orNovolog Sliding Scale:   <80                      151-200 + 1 +1 +1     201-250 +2 +2 +2 +   251-300 +3 +3 +3 +   301-350 +4 +4 +4 +   >350 +5 +5 +5 +      Lantus/Toujeo Insulin       8u     Not suing cgm - will submit via DME for donny 3 plus to reduce burden of use as dexcom is more complicated interface.    Follow up in 2 months.      Lab Results   Component Value Date    HGBA1C 9.3 (H) 06/08/2025

## 2025-06-24 NOTE — PATIENT INSTRUCTIONS
Novolog Insulin( skip this dose if you skip a meal)    4u    4u    4u     Regular, Apidra, Humalog orNovolog Sliding Scale:   <80                      151-200 + 1 +1 +1     201-250 +2 +2 +2 +   251-300 +3 +3 +3 +   301-350 +4 +4 +4 +   >350 +5 +5 +5 +      Lantus/Toujeo Insulin       8u     Check glucose before each mealtime.  Goal glucose is 80-200mg/dL

## 2025-06-25 ENCOUNTER — TELEPHONE (OUTPATIENT)
Age: 80
End: 2025-06-25

## 2025-06-25 NOTE — TELEPHONE ENCOUNTER
Patient called stating she was given a Dexcom G7 sensor sample at the office yesterday. She states it seems like it was already used or engaged. Her  got on the line and states when he pushes the applicator flush with her skin and tries to press the button to engage it, the button will not even go in. He is asking if they can come into the office for help?  Please advise

## 2025-06-25 NOTE — TELEPHONE ENCOUNTER
Patient's spouse called in to say that they were able to get the sensor on and it is working fine. Patient stated that it is in the warming phase. Nothing further.

## 2025-06-27 ENCOUNTER — HOME CARE VISIT (OUTPATIENT)
Dept: HOME HEALTH SERVICES | Facility: HOME HEALTHCARE | Age: 80
End: 2025-06-27
Payer: COMMERCIAL

## 2025-06-27 VITALS
OXYGEN SATURATION: 100 % | HEART RATE: 72 BPM | RESPIRATION RATE: 20 BRPM | DIASTOLIC BLOOD PRESSURE: 68 MMHG | SYSTOLIC BLOOD PRESSURE: 138 MMHG

## 2025-06-27 PROCEDURE — G0151 HHCP-SERV OF PT,EA 15 MIN: HCPCS

## 2025-06-30 DIAGNOSIS — I10 ESSENTIAL HYPERTENSION: ICD-10-CM

## 2025-06-30 RX ORDER — ISOSORBIDE MONONITRATE 30 MG/1
30 TABLET, EXTENDED RELEASE ORAL DAILY
Qty: 30 TABLET | Refills: 1 | Status: SHIPPED | OUTPATIENT
Start: 2025-06-30 | End: 2025-08-29

## 2025-06-30 NOTE — TELEPHONE ENCOUNTER
Patient called to request a refill for their Isosorbide 30 mg advised a refill was requested on 6/30/25 and is pending approval. Patient verbalized understanding and is in agreement.     Does the patient have enough for 3 days?   [] Yes   [x] No - Send as HP to POD

## 2025-06-30 NOTE — TELEPHONE ENCOUNTER
Reason for call:   [x] Refill   [] Prior Auth  [] Other:     Office:   [x] PCP/Provider - [facility] / Oracio Clemens DO  [] Specialty/Provider -     Medication: isosorbide mononitrate (IMDUR) 30 mg 24 hr tablet     Dose/Frequency: Take 1 tablet (30 mg total) by mouth daily,     Quantity: 30    Pharmacy: [facility]/pharmacy #[id number] [facility] [address] [facility]     Local Pharmacy   Does the patient have enough for 3 days?   [] Yes   [x] No - Send as HP to POD    Mail Away Pharmacy   Does the patient have enough for 10 days?   [] Yes   [] No - Send as HP to POD

## 2025-07-01 ENCOUNTER — PATIENT OUTREACH (OUTPATIENT)
Dept: CASE MANAGEMENT | Facility: OTHER | Age: 80
End: 2025-07-01

## 2025-07-01 NOTE — PROGRESS NOTES
Outpatient Care Management note- Unable to reach    Chart review completed    Outreach to the patient on 6/10/25 and 6/17/25 with no response. A follow up unable to reach letter was also sent in [place].     Patient continues with JERRY VALENZUELA PT, SN and MSW.     Will close the referral.

## 2025-07-02 ENCOUNTER — HOME CARE VISIT (OUTPATIENT)
Dept: HOME HEALTH SERVICES | Facility: HOME HEALTHCARE | Age: 80
End: 2025-07-02
Payer: COMMERCIAL

## 2025-07-02 PROCEDURE — G0151 HHCP-SERV OF PT,EA 15 MIN: HCPCS

## 2025-07-03 ENCOUNTER — HOME CARE VISIT (OUTPATIENT)
Dept: HOME HEALTH SERVICES | Facility: HOME HEALTHCARE | Age: 80
End: 2025-07-03
Payer: COMMERCIAL

## 2025-07-03 DIAGNOSIS — E11.42 DIABETIC POLYNEUROPATHY ASSOCIATED WITH TYPE 2 DIABETES MELLITUS (HCC): Primary | Chronic | ICD-10-CM

## 2025-07-03 RX ORDER — INSULIN ASPART 100 [IU]/ML
INJECTION, SOLUTION INTRAVENOUS; SUBCUTANEOUS
Qty: 15 ML | Refills: 3 | Status: SHIPPED | OUTPATIENT
Start: 2025-07-03

## 2025-07-03 NOTE — TELEPHONE ENCOUNTER
Rena is calling to see if Dr. Clemens can refill her Novolog insulin pen.   She states she doesn't know who ordered this for her last. She only takes this before her meals on sliding scale - depending on what her glucose level is and where from 5-9 units at a time.    Please advise if this is something we can refill for her.     Thank you!

## 2025-07-07 NOTE — PROGRESS NOTES
Name: Yajaira Marsh      : 1945      MRN: [id number]  Encounter Provider: Teresa Fofana PA-C  Encounter Date: 2025   Encounter department: [facility] GASTROENTEROLOGY [place] [address]  :  Assessment & Plan  Weight loss  Patient reports 40 lb unintentional weight loss over the last few months. Reports decreased appetite and po intake. She denies symptoms including change in bowel habits, blood in stool, abdominal pain, nausea, vomiting, early satiety, heartburn, reflux, constipation, diarrhea. She does note that she has BMs about twice per week on average but reports this is typical for her and she does not feel constipated. Per chart review patient was last seen in office in 2024 and was reporting diarrhea at that time. She had a CT abdomen and pelvis done in March which showed colonic diverticula without inflammation but was otherwise unremarkable from GI perspective.     - Patient is overdue for colonoscopy with last colonoscopy done in . Discussed further evaluation of weight loss with EGD and colonoscopy however patient is unsure if she would like to proceed with this. She states that she has a visit with her PCP in the next two weeks and would like to further discuss and think about it. She reports she will contact the office if she decides to pursue further workup with endoscopic evaluation.    Orders:    Ambulatory Referral to Gastroenterology        History of Present Illness   Yajaira Marsh is a 79 y.o. female with PMH including DM, PE/DVT on Eliquis, CAD, CKD 3, HTN, depression who presents for office visit.    Patient reports 40 lb unintentional weight loss over the last few months. She denies symptoms including abdominal pain, nausea, vomiting, heartburn, reflux, early satiety. She denies change in bowel habits. She reports that she goes to the bathroom about twice per week and denies feeling constipated. She reports that she does not have  "much of an appetite and is eating less. She reports feeling bloated at times. Denies diarrhea, blood in stool, black stools, fever, chills. No known family history of colon cancer.     Colonoscopy in 2017 done by Dr. Don, noted to have tubular adenoma x 2 and adenomatous polyp as well as mild diverticulosis. Repeat colonoscopy was recommended in 3 years.     She was last seen in office in December 2024 due to diarrhea, fecal urgency. Pancreatic elastase and fecal fat were ordered but never completed. Colonoscopy was recommended and ordered but never scheduled.      HPI  History obtained from: patient  Review of Systems A complete review of systems is negative other than that noted above in the HPI.      Current Medications[1]  Objective   /79 (BP Location: Left arm, Patient Position: Sitting, Cuff Size: Standard)   Pulse 72   Ht 5' 1\" (1.549 m)   Wt 6.985 kg (15 lb 6.4 oz)   BMI 2.91 kg/m²     Physical Exam  Vitals reviewed.   Constitutional:       General: She is not in acute distress.    Cardiovascular:      Rate and Rhythm: Normal rate.   Pulmonary:      Effort: Pulmonary effort is normal. No respiratory distress.   Abdominal:      Palpations: Abdomen is soft.      Tenderness: There is abdominal tenderness (Reports epigastric discomfort). There is no guarding or rebound.     Musculoskeletal:      Cervical back: Neck supple.     Skin:     General: Skin is warm and dry.     Neurological:      Mental Status: She is alert.     Psychiatric:         Mood and Affect: Mood normal.         Behavior: Behavior normal.          Lab Results: I personally reviewed relevant lab results.                    [1]   Current Outpatient Medications   Medication Sig Dispense Refill    apixaban (Eliquis) 5 mg Take 1 tablet (5 mg total) by mouth 2 (two) times a day      aspirin (ECOTRIN LOW STRENGTH) 81 mg EC tablet Take 1 tablet (81 mg total) by mouth daily 30 tablet 0    carvedilol (COREG) 6.25 mg tablet Take 1 tablet " (6.25 mg total) by mouth 2 (two) times a day with meals 60 tablet 0    diltiazem (CARDIZEM CD) 120 mg 24 hr capsule Take 1 capsule (120 mg total) by mouth daily 30 capsule 1    insulin aspart (NovoLOG FlexPen) 100 UNIT/ML injection pen Novolog Insulin( skip this dose if you skip a meal): 10u Regular, Apidra, Humalog or Novolog Sliding Scale: <80  0 units   0 units 151-200 +2 units 201-250 +4 units 251-300 +6 units 301-350 +8 units >350 +10 units 15 mL 3    Insulin Glargine Solostar (Lantus SoloStar) 100 UNIT/ML SOPN Use 7u subcutaneous daily bedtime 15 mL 1    isosorbide mononitrate (IMDUR) 30 mg 24 hr tablet Take 1 tablet (30 mg total) by mouth daily 30 tablet 1    QUEtiapine (SEROquel) 25 mg tablet Take 1 tablet (25 mg total) by mouth daily at bedtime 30 tablet 0    cyanocobalamin (VITAMIN B-12) 1000 MCG tablet Take 1 tablet (1,000 mcg total) by mouth daily 30 tablet 1    desloratadine (CLARINEX) 5 MG tablet Take 1 tablet (5 mg total) by mouth daily for 14 days 14 tablet 0    ergocalciferol (VITAMIN D2) 50,000 units Take 1 capsule (50,000 Units total) by mouth once a week (Patient taking differently: Take 50,000 Units by mouth once a week pt need to  in pharmacy) 4 capsule 0    gabapentin (NEURONTIN) 100 mg capsule Take 1 capsule (100 mg total) by mouth 2 (two) times a day (Patient taking differently: Take 100 mg by mouth in the morning and 100 mg in the evening. pt taked 1 capsule daily, PO, at dinne time  .) 60 capsule 0    levothyroxine 75 mcg tablet Take 1 tablet (75 mcg total) by mouth daily 30 tablet 1    melatonin 3 mg Take 1 tablet (3 mg total) by mouth daily at bedtime (Patient not taking: Reported on 7/8/2025) 30 tablet 0    mirtazapine (REMERON) 30 mg tablet Take 1 tablet (30 mg total) by mouth daily at bedtime (Patient not taking: Reported on 7/8/2025) 30 tablet 0     No current facility-administered medications for this visit.

## 2025-07-08 ENCOUNTER — OFFICE VISIT (OUTPATIENT)
Dept: GASTROENTEROLOGY | Facility: CLINIC | Age: 80
End: 2025-07-08
Payer: COMMERCIAL

## 2025-07-08 VITALS
SYSTOLIC BLOOD PRESSURE: 142 MMHG | DIASTOLIC BLOOD PRESSURE: 79 MMHG | WEIGHT: 15.4 LBS | HEIGHT: 61 IN | HEART RATE: 72 BPM | BODY MASS INDEX: 2.91 KG/M2

## 2025-07-08 DIAGNOSIS — R46.89 COGNITIVE AND BEHAVIORAL CHANGES: ICD-10-CM

## 2025-07-08 DIAGNOSIS — R63.4 WEIGHT LOSS: ICD-10-CM

## 2025-07-08 DIAGNOSIS — I26.99 OTHER ACUTE PULMONARY EMBOLISM, UNSPECIFIED WHETHER ACUTE COR PULMONALE PRESENT (HCC): ICD-10-CM

## 2025-07-08 DIAGNOSIS — R19.4 CHANGE IN BOWEL HABIT: ICD-10-CM

## 2025-07-08 DIAGNOSIS — R41.89 COGNITIVE AND BEHAVIORAL CHANGES: ICD-10-CM

## 2025-07-08 PROCEDURE — 99214 OFFICE O/P EST MOD 30 MIN: CPT

## 2025-07-08 RX ORDER — QUETIAPINE FUMARATE 25 MG/1
25 TABLET, FILM COATED ORAL
Qty: 30 TABLET | Refills: 0 | Status: SHIPPED | OUTPATIENT
Start: 2025-07-08

## 2025-07-08 RX ORDER — MIRTAZAPINE 30 MG/1
30 TABLET, FILM COATED ORAL
Qty: 30 TABLET | Refills: 0 | Status: SHIPPED | OUTPATIENT
Start: 2025-07-08

## 2025-07-08 NOTE — TELEPHONE ENCOUNTER
Medication: mirtzapine     Dose/Frequency: 30 mg tablet at bedtime     Quantity: 30 tablets    Pharmacy: [facility]     Office:   [] PCP/Provider -   [x] Speciality/Provider -     Does the patient have enough for 3 days?   [] Yes   [x] No - Send as HP to POD     Medication: seroquel    Dose/Frequency: 25 mg tablet at bedtime     Quantity: 30 tablets     Pharmacy: [facility]    Office:   [] PCP/Provider -   [x] Speciality/Provider -     Does the patient have enough for 3 days?   [] Yes   [x] No - Send as HP to POD

## 2025-07-08 NOTE — TELEPHONE ENCOUNTER
Please review to see if the refill is appropriate.   mirtazapine    Refill must be reviewed and completed by the office or provider. The refill is unable to be approved or denied by the medication management team.    Cannot be delegated  quetiapine

## 2025-07-09 ENCOUNTER — HOME CARE VISIT (OUTPATIENT)
Dept: HOME HEALTH SERVICES | Facility: HOME HEALTHCARE | Age: 80
End: 2025-07-09
Payer: COMMERCIAL

## 2025-07-09 PROCEDURE — G0151 HHCP-SERV OF PT,EA 15 MIN: HCPCS

## 2025-07-10 ENCOUNTER — HOME CARE VISIT (OUTPATIENT)
Dept: HOME HEALTH SERVICES | Facility: HOME HEALTHCARE | Age: 80
End: 2025-07-10
Payer: COMMERCIAL

## 2025-07-10 VITALS — DIASTOLIC BLOOD PRESSURE: 60 MMHG | HEART RATE: 80 BPM | OXYGEN SATURATION: 98 % | SYSTOLIC BLOOD PRESSURE: 132 MMHG

## 2025-07-10 VITALS
OXYGEN SATURATION: 98 % | HEART RATE: 88 BPM | DIASTOLIC BLOOD PRESSURE: 82 MMHG | TEMPERATURE: 97.5 F | RESPIRATION RATE: 16 BRPM | SYSTOLIC BLOOD PRESSURE: 146 MMHG

## 2025-07-10 DIAGNOSIS — I26.99 OTHER ACUTE PULMONARY EMBOLISM, UNSPECIFIED WHETHER ACUTE COR PULMONALE PRESENT (HCC): ICD-10-CM

## 2025-07-10 DIAGNOSIS — I26.99 PULMONARY EMBOLUS (HCC): ICD-10-CM

## 2025-07-10 PROCEDURE — G0299 HHS/HOSPICE OF RN EA 15 MIN: HCPCS

## 2025-07-10 RX ORDER — CARVEDILOL 6.25 MG/1
6.25 TABLET ORAL 2 TIMES DAILY WITH MEALS
Qty: 60 TABLET | Refills: 0 | Status: SHIPPED | OUTPATIENT
Start: 2025-07-10

## 2025-07-10 NOTE — TELEPHONE ENCOUNTER
Reason for call:   [x] Refill   [] Prior Auth  [] Other:     Office:   [x] PCP/Provider - [facility]   [] Specialty/Provider -     Medication: carvedilol (COREG) 6.25 mg tablet     Dose/Frequency:     6.25 mg, 2 times daily with meals       Quantity: 60    Pharmacy: [id number]    Local Pharmacy   Does the patient have enough for 3 days?   [] Yes   [x] No - Send as HP to POD    Mail Away Pharmacy   Does the patient have enough for 10 days?   [] Yes   [] No - Send as HP to POD

## 2025-07-10 NOTE — TELEPHONE ENCOUNTER
Reason for call:   [x] Refill   [] Prior Auth  [] Other:     Office:   [x] PCP/Provider - Sukumar Clemens, DO   [] Specialty/Provider -     Medication: apixaban (Eliquis) 5 mg / Take 1 tablet (5 mg total) by mouth 2 (two) times a day     Pharmacy: [id number] [place] [place] - [address] [facility]   Does the patient have enough for 3 days?   [] Yes   [x] No - Send as HP to POD

## 2025-07-15 DIAGNOSIS — F32.A DEPRESSIVE DISORDER: ICD-10-CM

## 2025-07-16 ENCOUNTER — HOME CARE VISIT (OUTPATIENT)
Dept: HOME HEALTH SERVICES | Facility: HOME HEALTHCARE | Age: 80
End: 2025-07-16
Payer: COMMERCIAL

## 2025-07-16 VITALS
DIASTOLIC BLOOD PRESSURE: 76 MMHG | OXYGEN SATURATION: 98 % | HEART RATE: 64 BPM | TEMPERATURE: 97.5 F | RESPIRATION RATE: 16 BRPM | SYSTOLIC BLOOD PRESSURE: 140 MMHG

## 2025-07-16 PROCEDURE — G0299 HHS/HOSPICE OF RN EA 15 MIN: HCPCS

## 2025-07-23 DIAGNOSIS — I10 ESSENTIAL HYPERTENSION: ICD-10-CM

## 2025-07-25 RX ORDER — ISOSORBIDE MONONITRATE 30 MG/1
30 TABLET, EXTENDED RELEASE ORAL DAILY
Qty: 90 TABLET | Refills: 1 | Status: SHIPPED | OUTPATIENT
Start: 2025-07-25

## 2025-08-01 DIAGNOSIS — I26.99 OTHER ACUTE PULMONARY EMBOLISM, UNSPECIFIED WHETHER ACUTE COR PULMONALE PRESENT (HCC): ICD-10-CM

## 2025-08-04 RX ORDER — CARVEDILOL 6.25 MG/1
6.25 TABLET ORAL 2 TIMES DAILY WITH MEALS
Qty: 180 TABLET | Refills: 1 | Status: SHIPPED | OUTPATIENT
Start: 2025-08-04

## 2025-08-05 DIAGNOSIS — I10 ESSENTIAL HYPERTENSION: ICD-10-CM

## 2025-08-06 RX ORDER — DILTIAZEM HYDROCHLORIDE 120 MG/1
120 CAPSULE, COATED, EXTENDED RELEASE ORAL DAILY
Qty: 30 CAPSULE | Refills: 5 | Status: SHIPPED | OUTPATIENT
Start: 2025-08-06

## 2025-08-07 ENCOUNTER — HOSPITAL ENCOUNTER (EMERGENCY)
Facility: HOSPITAL | Age: 80
Discharge: HOME | End: 2025-08-07
Attending: EMERGENCY MEDICINE
Payer: COMMERCIAL

## 2025-08-07 ENCOUNTER — APPOINTMENT (EMERGENCY)
Dept: RADIOLOGY | Facility: HOSPITAL | Age: 80
End: 2025-08-07
Payer: COMMERCIAL

## 2025-08-08 ENCOUNTER — OFFICE VISIT (OUTPATIENT)
Dept: INTERNAL MEDICINE CLINIC | Facility: CLINIC | Age: 80
End: 2025-08-08
Payer: COMMERCIAL

## 2025-08-08 ENCOUNTER — TELEPHONE (OUTPATIENT)
Age: 80
End: 2025-08-08

## 2025-08-08 VITALS
TEMPERATURE: 97.8 F | DIASTOLIC BLOOD PRESSURE: 68 MMHG | OXYGEN SATURATION: 98 % | SYSTOLIC BLOOD PRESSURE: 138 MMHG | WEIGHT: 155 LBS | HEART RATE: 69 BPM | BODY MASS INDEX: 29.29 KG/M2

## 2025-08-08 DIAGNOSIS — I10 ESSENTIAL HYPERTENSION: ICD-10-CM

## 2025-08-08 DIAGNOSIS — Z79.4 TYPE 2 DIABETES MELLITUS WITH HYPERGLYCEMIA, WITH LONG-TERM CURRENT USE OF INSULIN (HCC): ICD-10-CM

## 2025-08-08 DIAGNOSIS — E11.65 TYPE 2 DIABETES MELLITUS WITH HYPERGLYCEMIA, WITH LONG-TERM CURRENT USE OF INSULIN (HCC): ICD-10-CM

## 2025-08-08 DIAGNOSIS — F41.9 ANXIETY: Primary | ICD-10-CM

## 2025-08-08 PROCEDURE — 99214 OFFICE O/P EST MOD 30 MIN: CPT | Performed by: INTERNAL MEDICINE

## 2025-08-08 PROCEDURE — G2211 COMPLEX E/M VISIT ADD ON: HCPCS | Performed by: INTERNAL MEDICINE

## 2025-08-12 ENCOUNTER — OFFICE VISIT (OUTPATIENT)
Dept: INTERNAL MEDICINE CLINIC | Facility: CLINIC | Age: 80
End: 2025-08-12
Payer: COMMERCIAL

## 2025-08-12 ENCOUNTER — TELEPHONE (OUTPATIENT)
Age: 80
End: 2025-08-12

## 2025-08-13 ENCOUNTER — TELEPHONE (OUTPATIENT)
Dept: PSYCHIATRY | Facility: CLINIC | Age: 80
End: 2025-08-13

## 2025-08-13 ENCOUNTER — TELEPHONE (OUTPATIENT)
Age: 80
End: 2025-08-13

## 2025-08-19 ENCOUNTER — PATIENT OUTREACH (OUTPATIENT)
Dept: CASE MANAGEMENT | Facility: OTHER | Age: 80
End: 2025-08-19

## 2025-08-19 DIAGNOSIS — F32.A DEPRESSIVE DISORDER: ICD-10-CM

## 2025-08-19 RX ORDER — CYANOCOBALAMIN (VITAMIN B-12) 1000 MCG
1 TABLET ORAL DAILY
Qty: 90 TABLET | Refills: 1 | Status: SHIPPED | OUTPATIENT
Start: 2025-08-19

## (undated) DEVICE — PAD GROUNDING ADULT

## (undated) DEVICE — VIAL DECANTER

## (undated) DEVICE — MONOPOLAR CURVED SCISSORS: Brand: ENDOWRIST;DAVINCI SI

## (undated) DEVICE — Device: Brand: MEDEX

## (undated) DEVICE — ANTIBACTERIAL VIOLET BRAIDED (POLYGLACTIN 910), SYNTHETIC ABSORBABLE SUTURE: Brand: COATED VICRYL

## (undated) DEVICE — SUT MONOCRYL 4-0 PS-2 27 IN Y426H

## (undated) DEVICE — LARGE NEEDLE DRIVER: Brand: ENDOWRIST;DAVINCI SI

## (undated) DEVICE — Device: Brand: TISSUE RETRIEVAL SYSTEM

## (undated) DEVICE — CHLORAPREP HI-LITE 26ML ORANGE

## (undated) DEVICE — PACK ROBOTIC PROSTATE PBDS DA VINCI SI/XI

## (undated) DEVICE — ASTOUND STANDARD SURGICAL GOWN, XL: Brand: CONVERTORS

## (undated) DEVICE — MAYO STAND COVER: Brand: CONVERTORS

## (undated) DEVICE — TELFA NON-ADHERENT ABSORBENT DRESSING: Brand: TELFA

## (undated) DEVICE — ALL PURPOSE SPONGES,NONWOVEN, 4 PLY: Brand: CURITY

## (undated) DEVICE — UTERINE MANIPULATOR RUMI 6.7 X 8 CM

## (undated) DEVICE — SUT VICRYL 2-0 SH 27 IN UNDYED J417H

## (undated) DEVICE — GLOVE SRG BIOGEL 8.5

## (undated) DEVICE — 1820 FOAM BLOCK NEEDLE COUNTER: Brand: DEVON

## (undated) DEVICE — FENESTRATED BIPOLAR FORCEPS: Brand: ENDOWRIST;DAVINCI SI

## (undated) DEVICE — BETHLEHEM UNIVERSAL MINOR GEN: Brand: CARDINAL HEALTH

## (undated) DEVICE — OCCLUDER COLPO-PNEUMO

## (undated) DEVICE — NEEDLE 22 G X 1 1/2 SAFETY

## (undated) DEVICE — STERILE CYSTO PACK: Brand: CARDINAL HEALTH

## (undated) DEVICE — NEEDLE 25G X 1 1/2

## (undated) DEVICE — SYRINGE 50ML LL

## (undated) DEVICE — ENDOPATH XCEL BLADELESS TROCARS WITH STABILITY SLEEVES: Brand: ENDOPATH XCEL

## (undated) DEVICE — GLOVE INDICATOR PI UNDERGLOVE SZ 8 BLUE

## (undated) DEVICE — ENDOPOUCH RETRIEVER SPECIMEN RETRIEVAL BAGS: Brand: ENDOPOUCH RETRIEVER

## (undated) DEVICE — INTENDED FOR TISSUE SEPARATION, AND OTHER PROCEDURES THAT REQUIRE A SHARP SURGICAL BLADE TO PUNCTURE OR CUT.: Brand: BARD-PARKER SAFETY BLADES SIZE 15, STERILE

## (undated) DEVICE — GLOVE SRG BIOGEL ORTHOPEDIC 8

## (undated) DEVICE — PROGRASP FORCEPS: Brand: ENDOWRIST;DAVINCI SI

## (undated) DEVICE — ADHESIVE SKIN HIGH VISCOSITY EXOFIN 1ML

## (undated) DEVICE — 3000CC GUARDIAN II: Brand: GUARDIAN

## (undated) DEVICE — TOWEL SURG XR DETECT GREEN STRL RFD

## (undated) DEVICE — DRAPE EQUIPMENT RF WAND

## (undated) DEVICE — TIP COVER ACCESSORY

## (undated) DEVICE — DRAPE FLUID WARMER (BIRD BATH)

## (undated) DEVICE — PLUMEPEN PRO 10FT

## (undated) DEVICE — LUBRICANT INST ELECTROLUBE ANTISTK WO PAD

## (undated) DEVICE — ROBOT ACCESSORY KIT 4 ARM

## (undated) DEVICE — GLOVE SRG BIOGEL 7.5

## (undated) DEVICE — SYRINGE 10ML LL

## (undated) DEVICE — BIPOLAR CORD DISP

## (undated) DEVICE — CHLORHEXIDINE 4PCT 4 OZ

## (undated) DEVICE — TRAY FOLEY 16FR URIMETER SURESTEP

## (undated) DEVICE — GLOVE INDICATOR PI UNDERGLOVE SZ 8.5 BLUE

## (undated) DEVICE — GLOVE INDICATOR PI UNDERGLOVE SZ 7 BLUE

## (undated) DEVICE — BULB SYRINGE,IRRIGATION WITH PROTECTIVE CAP: Brand: DOVER

## (undated) DEVICE — ADHESIVE SKN CLSR HISTOACRYL FLEX 0.5ML LF